# Patient Record
Sex: MALE | Race: BLACK OR AFRICAN AMERICAN | Employment: OTHER | URBAN - NONMETROPOLITAN AREA
[De-identification: names, ages, dates, MRNs, and addresses within clinical notes are randomized per-mention and may not be internally consistent; named-entity substitution may affect disease eponyms.]

---

## 2017-02-23 ENCOUNTER — TELEPHONE (OUTPATIENT)
Dept: CARDIOLOGY | Age: 52
End: 2017-02-23

## 2017-05-04 PROBLEM — Z99.2 ESRD (END STAGE RENAL DISEASE) ON DIALYSIS (HCC): Status: ACTIVE | Noted: 2017-05-04

## 2017-05-04 PROBLEM — N18.6 ESRD (END STAGE RENAL DISEASE) ON DIALYSIS (HCC): Status: ACTIVE | Noted: 2017-05-04

## 2017-05-04 PROBLEM — R73.9 HYPERGLYCEMIA: Status: ACTIVE | Noted: 2017-05-04

## 2017-05-06 ENCOUNTER — TELEPHONE (OUTPATIENT)
Dept: CARDIOLOGY | Age: 52
End: 2017-05-06

## 2017-07-14 RX ORDER — CLINDAMYCIN PHOSPHATE 600 MG/50ML
600 INJECTION INTRAVENOUS
Status: CANCELLED | OUTPATIENT
Start: 2017-07-14

## 2017-07-14 RX ORDER — MIDAZOLAM HYDROCHLORIDE 1 MG/ML
1 INJECTION INTRAMUSCULAR; INTRAVENOUS ONCE
Status: CANCELLED | OUTPATIENT
Start: 2017-07-14 | End: 2017-07-14

## 2017-07-14 RX ORDER — SODIUM CHLORIDE 450 MG/100ML
INJECTION, SOLUTION INTRAVENOUS CONTINUOUS
Status: CANCELLED | OUTPATIENT
Start: 2017-07-14

## 2017-07-14 RX ORDER — LIDOCAINE 40 MG/G
CREAM TOPICAL ONCE
Status: CANCELLED | OUTPATIENT
Start: 2017-07-14 | End: 2017-07-14

## 2017-07-17 ENCOUNTER — HOSPITAL ENCOUNTER (OUTPATIENT)
Dept: INTERVENTIONAL RADIOLOGY/VASCULAR | Age: 52
Discharge: HOME | End: 2017-07-17
Payer: MEDICARE

## 2017-07-17 ENCOUNTER — HOSPITAL ENCOUNTER (OUTPATIENT)
Dept: NURSING | Age: 52
Discharge: HOME | End: 2017-07-17
Payer: MEDICARE

## 2017-07-17 VITALS
SYSTOLIC BLOOD PRESSURE: 173 MMHG | RESPIRATION RATE: 16 BRPM | DIASTOLIC BLOOD PRESSURE: 80 MMHG | OXYGEN SATURATION: 96 % | HEART RATE: 88 BPM

## 2017-07-17 VITALS — HEART RATE: 82 BPM | OXYGEN SATURATION: 94 % | SYSTOLIC BLOOD PRESSURE: 154 MMHG | DIASTOLIC BLOOD PRESSURE: 98 MMHG

## 2017-07-17 DIAGNOSIS — N18.6 ESRD (END STAGE RENAL DISEASE) (HCC): ICD-10-CM

## 2017-07-17 PROCEDURE — 6360000004 HC RX CONTRAST MEDICATION: Performed by: RADIOLOGY

## 2017-07-17 PROCEDURE — 2780000010 HC IMPLANT OTHER

## 2017-07-17 PROCEDURE — C1894 INTRO/SHEATH, NON-LASER: HCPCS

## 2017-07-17 PROCEDURE — A6257 TRANSPARENT FILM <= 16 SQ IN: HCPCS

## 2017-07-17 PROCEDURE — 2500000003 HC RX 250 WO HCPCS

## 2017-07-17 PROCEDURE — 36902 INTRO CATH DIALYSIS CIRCUIT: CPT | Performed by: RADIOLOGY

## 2017-07-17 PROCEDURE — 6360000002 HC RX W HCPCS

## 2017-07-17 PROCEDURE — C1725 CATH, TRANSLUMIN NON-LASER: HCPCS

## 2017-07-17 PROCEDURE — C1769 GUIDE WIRE: HCPCS

## 2017-07-17 PROCEDURE — A6258 TRANSPARENT FILM >16<=48 IN: HCPCS

## 2017-07-17 RX ORDER — MIDAZOLAM HYDROCHLORIDE 1 MG/ML
0.5 INJECTION INTRAMUSCULAR; INTRAVENOUS ONCE
Status: COMPLETED | OUTPATIENT
Start: 2017-07-17 | End: 2017-07-17

## 2017-07-17 RX ADMIN — MIDAZOLAM HYDROCHLORIDE 0.5 MG: 1 INJECTION INTRAMUSCULAR; INTRAVENOUS at 08:50

## 2017-07-17 RX ADMIN — Medication 0.5 MG: at 08:50

## 2017-07-17 RX ADMIN — IOVERSOL 65 ML: 509 INJECTION INTRA-ARTERIAL; INTRAVENOUS at 10:06

## 2017-07-17 ASSESSMENT — PAIN DESCRIPTION - LOCATION: LOCATION: ARM

## 2017-07-17 ASSESSMENT — PAIN SCALES - GENERAL
PAINLEVEL_OUTOF10: 0
PAINLEVEL_OUTOF10: 3

## 2017-07-17 ASSESSMENT — PAIN DESCRIPTION - DESCRIPTORS: DESCRIPTORS: CONSTANT;ACHING

## 2017-07-17 ASSESSMENT — PAIN DESCRIPTION - PAIN TYPE: TYPE: SURGICAL PAIN

## 2017-07-17 ASSESSMENT — PAIN DESCRIPTION - ORIENTATION: ORIENTATION: LEFT;UPPER

## 2017-07-17 ASSESSMENT — PAIN DESCRIPTION - ONSET: ONSET: ON-GOING

## 2017-07-17 ASSESSMENT — PAIN DESCRIPTION - FREQUENCY: FREQUENCY: CONTINUOUS

## 2017-07-17 NOTE — IP AVS SNAPSHOT
After Visit Summary  (Discharge Instructions)    Medication List for Home    Based on the information you provided to us as well as any changes during this visit, the following is your updated medication list.  Compare this with your prescription bottles at home. If you have any questions or concerns, contact your primary care physician's office.              Daily Medication List (This medication list can be shared with any healthcare provider who is helping you manage your medications)      ASK your doctor about these medications if you have questions        Last Dose    Next Dose Due AM NOON PM NIGHT    cloNIDine 0.1 MG tablet   Commonly known as:  CATAPRES   Take 3 tablets by mouth three times daily                                         doxazosin 4 MG tablet   Commonly known as:  CARDURA   Take 1 tablet by mouth nightly                                         famotidine 20 MG tablet   Commonly known as:  PEPCID   Take 1 tablet by mouth 2 times daily                                         fluticasone 50 MCG/ACT nasal spray   Commonly known as:  FLONASE   1 spray by Nasal route daily                                         isosorbide mononitrate 120 MG extended release tablet   Commonly known as:  IMDUR   Take 1 tablet by mouth daily                                         minoxidil 10 MG tablet   Commonly known as:  LONITEN   One tab twice daily                                         nicotine 14 MG/24HR   Commonly known as:  NICODERM CQ   Place 1 patch onto the skin daily                                         rosuvastatin 10 MG tablet   Commonly known as:  CRESTOR   Take 1 tablet by mouth nightly                                         sertraline 50 MG tablet   Commonly known as:  ZOLOFT   Take 1 tablet by mouth daily                                         sevelamer 800 MG tablet   Commonly known as:  RENVELA   Take 2 tablets by mouth 3 times daily (with meals) therapeutic multivitamin-minerals tablet   Take 1 tablet by mouth daily                                         verapamil 240 MG extended release tablet   Commonly known as:  CALAN SR   Take 1 tablet by mouth daily                                         vitamin D 2000 units Caps capsule   Take by mouth 2 times daily                                                 Allergies as of 2017     No Known Allergies      Immunizations as of 2017     Name Date Dose VIS Date Route    Influenza Virus Vaccine 10/10/2015 0.5 mL 2015 Intramuscular    Influenza Virus Vaccine 2014 0.5 mL 2014 Intramuscular    Influenza, Trivalent vaccine 3 years and above, IM 10/7/2016 0.5 mL 2015 Intramuscular    Pneumococcal Polysaccharide ([id number]) 10/6/2013 0.5 mL 10/6/2009 Intramuscular    Tdap (Boostrix, Adacel) 3/8/2013 0.5 mL 2012 Intramuscular      Last Vitals          Most Recent Value    Temp      Pulse  82    Resp      BP  (!)  154/98         After Visit Summary    This summary was created for you. Thank you for entrusting your care to us. The following information includes details about your hospital/visit stay along with steps you should take to help with your recovery once you leave the hospital.  In this packet, you will find information about the topics listed below:    · Instructions about your medications including a list of your home medications  · A summary of your hospital visit  · Follow-up appointments once you have left the hospital  · Your care plan at home      You may receive a survey regarding the care you received during your stay. Your input is valuable to us. We encourage you to complete and return your survey in the envelope provided. We hope you will choose us in the future for your healthcare needs.           Patient Information     Patient Name     Jay Gill 1965      Care Provided at:     Name Address Phone [address] [phone]            Your Visit    Here you will find information about your visit, including the reason for your visit. Please take this sheet with you when you visit your doctor or other health care provider in the future. It will help determine the best possible medical care for you at that time. If you have any questions once you leave the hospital, please call the department phone number listed below. Why you were here     Your primary diagnosis was:  Not on File      Visit Information     Date & Time Department Dept. Phone    7/17/2017 Violet Tran 281 [phone]       Follow-up Appointments    Below is a list of your follow-up and future appointments. This may not be a complete list as you may have made appointments directly with providers that we are not aware of or your providers may have made some for you. Please call your providers to confirm appointments. It is important to keep your appointments. Please bring your current insurance card, photo ID, co-pay, and all medication bottles to your appointment. If self-pay, payment is expected at the time of service. Preventive Care        Date Due    Eye Exam By An Eye Doctor 1/20/1977    HIV screening is recommended for all people regardless of risk factors  aged 15-65 years at least once (lifetime) who have never been HIV tested.  1/20/1982    Pneumococcal Vaccines (two) for adults aged 19-64  years who are immunocompromised or whose spleen is missing or not working  (2 of 3 - PCV13) 10/6/2014    Diabetic Foot Exam 6/16/2015    Colonoscopy 1/20/2017    Yearly Flu Vaccine (1) 8/1/2017    Hemoglobin A1C (Test For Long-Term Glucose Control) 11/4/2017    Cholesterol Screening 11/9/2017    Tetanus Combination Vaccine (2 - Td) 3/8/2023                 Care Plan Once You Return Home This section includes instructions you will need to follow once you leave the hospital.  Your care team will discuss these with you, so you and those caring for you know how to best care for your health needs at home. This section may also include educational information about certain health topics that may be of help to you. Discharge Instructions       ACTIVITY:  Continue usual care with your doctor. Call your doctor immediately if any severe problems or go to the nearest emergency room. GUARD LEFT ARM TODAY  NO LIFTING TODAY  IF BLEEDING OCCURS, APPLY PRESSURE AND RETURN TO EMERGENCY. LEFT DRESSINGS ON. DIALYSIS AS USUAL    I have been treated and hereby acknowledge receiving this instruction sheet. Important information for a smoker       SMOKING: QUIT SMOKING. THIS IS THE MOST IMPORTANT ACTION YOU CAN TAKE TO IMPROVE YOUR CURRENT AND FUTURE HEALTH. Call the [address] [place] [address] at [facility] ([phone])    Smoking harms nonsmokers. When nonsmokers are around people who smoke, they absorb nicotine, carbon monoxide, and other ingredients of tobacco smoke. DO NOT SMOKE AROUND CHILDREN     Children exposed to secondhand smoke are at an increased risk of:  Sudden Infant Death Syndrome (SIDS), acute respiratory infections, inflammation of the middle ear, and severe asthma. Over a longer time, it causes heart disease and lung cancer. There is no safe level of exposure to secondhand smoke. MyChart Signup     Our records indicate that you have declined MyChart signup. View your information online  ? Review your current list of  medications, immunization, and allergies. ? Review your future test results online . ?  Review your discharge instructions provided by your caregivers at discharge    Certain functionality such as prescription refills, scheduling appointments or sending messages to your provider are not activated if your provider does not use [facility] in his/her office    For questions regarding your MyChart account call [phone]. If you have a clinical question, please call your doctor's office. The information on all pages of the After Visit Summary has been reviewed with me, the patient and/or responsible adult, by my health care provider(s). I had the opportunity to ask questions regarding this information. I understand I should dispose of my armband safely at home to protect my health information. A complete copy of the After Visit Summary has been given to me, the patient and/or responsible adult.            Patient Signature/Responsible Adult:____________________    Clinician Signature:_____________________    Date:_____________________    Time:_____________________

## 2017-07-17 NOTE — PROGRESS NOTES
10:25 AM PT RETURNS FROM X-RAY-SLIP KNOTS TIMES 2 DRY AND INTACT TO LEFT UPPER ARM-GOOD CAMDEN NOTED  10:26 AM FLUIDS OFFERED

## 2017-07-17 NOTE — IP AVS SNAPSHOT
Patient Information     Patient Name NITHYA Davis 1965         This is your updated medication list to keep with you all times      ASK your doctor about these medications     cloNIDine 0.1 MG tablet   Commonly known as:  CATAPRES   Take 3 tablets by mouth three times daily       doxazosin 4 MG tablet   Commonly known as:  CARDURA   Take 1 tablet by mouth nightly       famotidine 20 MG tablet   Commonly known as:  PEPCID   Take 1 tablet by mouth 2 times daily       fluticasone 50 MCG/ACT nasal spray   Commonly known as:  FLONASE       isosorbide mononitrate 120 MG extended release tablet   Commonly known as:  IMDUR   Take 1 tablet by mouth daily       minoxidil 10 MG tablet   Commonly known as:  LONITEN   One tab twice daily       nicotine 14 MG/24HR   Commonly known as:  NICODERM CQ   Place 1 patch onto the skin daily       rosuvastatin 10 MG tablet   Commonly known as:  CRESTOR   Take 1 tablet by mouth nightly       sertraline 50 MG tablet   Commonly known as:  ZOLOFT   Take 1 tablet by mouth daily       sevelamer 800 MG tablet   Commonly known as:  RENVELA   Take 2 tablets by mouth 3 times daily (with meals)       therapeutic multivitamin-minerals tablet       verapamil 240 MG extended release tablet   Commonly known as:  CALAN SR   Take 1 tablet by mouth daily       vitamin D 2000 units Caps capsule

## 2017-07-17 NOTE — IP AVS SNAPSHOT
Patient Information     Patient Name NITHYA Chen 1965      Important Information for Heart Failure       If your condition worsens or if you have any concerns, call your doctor or seek emergency medical services (dial 9-1-1) as needed. If you have any of the following symptoms/conditions, call your doctor. Call your primary care physician to obtain results of outstanding lab tests, cultures, x-rays, or other tests. If you have a current diagnosis or history of any of the following, please review the information carefully. HEART FAILURE PATIENTS:   DISCHARGE WEIGHT:    Record daily weights. Notify your doctor if you have a weight gain 2 pounds in a day, or 3-5 pounds in 1 week. Notify your doctor for increased shortness of breath, or swelling in legs or feet. Follow a low sodium diet. Resume normal activity unless otherwise instructed by your doctor.

## 2017-08-04 ENCOUNTER — HOSPITAL ENCOUNTER (INPATIENT)
Age: 52
LOS: 2 days | Discharge: HOME | DRG: 304 | End: 2017-08-06
Attending: EMERGENCY MEDICINE | Admitting: INTERNAL MEDICINE
Payer: MEDICARE

## 2017-08-04 DIAGNOSIS — F19.10 SUBSTANCE ABUSE (HCC): ICD-10-CM

## 2017-08-04 DIAGNOSIS — I16.1 HYPERTENSIVE EMERGENCY: Primary | ICD-10-CM

## 2017-08-04 LAB
AMPHETAMINE+METHAMPHETAMINE URINE SCREEN: NEGATIVE
ANION GAP SERPL CALCULATED.3IONS-SCNC: 19 MEQ/L (ref 8–16)
ANION GAP SERPL CALCULATED.3IONS-SCNC: 24 MEQ/L (ref 8–16)
ANISOCYTOSIS: ABNORMAL
BACTERIA: ABNORMAL
BARBITURATE QUANTITATIVE URINE: NEGATIVE
BASOPHILS # BLD: 0.5 %
BASOPHILS ABSOLUTE: 0 THOU/MM3 (ref 0–0.1)
BENZODIAZEPINE QUANTITATIVE URINE: NEGATIVE
BILIRUBIN URINE: NEGATIVE
BLOOD, URINE: ABNORMAL
BUN BLDV-MCNC: 83 MG/DL (ref 7–22)
BUN BLDV-MCNC: 89 MG/DL (ref 7–22)
CALCIUM SERPL-MCNC: 9 MG/DL (ref 8.5–10.5)
CALCIUM SERPL-MCNC: 9.1 MG/DL (ref 8.5–10.5)
CANNABINOID QUANTITATIVE URINE: NEGATIVE
CASTS: ABNORMAL /LPF
CASTS: ABNORMAL /LPF
CHARACTER, URINE: CLEAR
CHLORIDE BLD-SCNC: 100 MEQ/L (ref 98–111)
CHLORIDE BLD-SCNC: 99 MEQ/L (ref 98–111)
CO2: 19 MEQ/L (ref 23–33)
CO2: 22 MEQ/L (ref 23–33)
COCAINE METABOLITE QUANTITATIVE URINE: POSITIVE
COLOR: YELLOW
CREAT SERPL-MCNC: 12.7 MG/DL (ref 0.4–1.2)
CREAT SERPL-MCNC: 12.7 MG/DL (ref 0.4–1.2)
CRYSTALS: ABNORMAL
EKG ATRIAL RATE: 104 BPM
EKG P AXIS: 56 DEGREES
EKG P-R INTERVAL: 170 MS
EKG Q-T INTERVAL: 384 MS
EKG QRS DURATION: 100 MS
EKG QTC CALCULATION (BAZETT): 504 MS
EKG R AXIS: -51 DEGREES
EKG T AXIS: 90 DEGREES
EKG VENTRICULAR RATE: 104 BPM
EOSINOPHIL # BLD: 0.7 %
EOSINOPHILS ABSOLUTE: 0.1 THOU/MM3 (ref 0–0.4)
EPITHELIAL CELLS, UA: ABNORMAL /HPF
ETHYL ALCOHOL, SERUM: < 0.01 %
GFR SERPL CREATININE-BSD FRML MDRD: 5 ML/MIN/1.73M2
GFR SERPL CREATININE-BSD FRML MDRD: 5 ML/MIN/1.73M2
GLUCOSE BLD-MCNC: 124 MG/DL (ref 70–108)
GLUCOSE BLD-MCNC: 95 MG/DL (ref 70–108)
GLUCOSE, URINE: NEGATIVE MG/DL
HCT VFR BLD CALC: 37.8 % (ref 42–52)
HEMOGLOBIN: 12.6 GM/DL (ref 14–18)
KETONES, URINE: NEGATIVE
LEUKOCYTE ESTERASE, URINE: NEGATIVE
LYMPHOCYTES # BLD: 9.1 %
LYMPHOCYTES ABSOLUTE: 0.7 THOU/MM3 (ref 1–4.8)
MCH RBC QN AUTO: 32 PG (ref 27–31)
MCHC RBC AUTO-ENTMCNC: 33.2 GM/DL (ref 33–37)
MCV RBC AUTO: 96.3 FL (ref 80–94)
MISCELLANEOUS LAB TEST RESULT: ABNORMAL
MONOCYTES # BLD: 6.5 %
MONOCYTES ABSOLUTE: 0.5 THOU/MM3 (ref 0.4–1.3)
NITRITE, URINE: NEGATIVE
NUCLEATED RED BLOOD CELLS: 0 /100 WBC
OPIATES, URINE: NEGATIVE
OSMOLALITY CALCULATION: 306.2 MOSMOL/KG (ref 275–300)
OXYCODONE: NEGATIVE
PDW BLD-RTO: 14.7 % (ref 11.5–14.5)
PH UA: 7
PHENCYCLIDINE QUANTITATIVE URINE: NEGATIVE
PLATELET # BLD: 115 THOU/MM3 (ref 130–400)
PMV BLD AUTO: 8.8 MCM (ref 7.4–10.4)
POTASSIUM SERPL-SCNC: 5.4 MEQ/L (ref 3.5–5.2)
POTASSIUM SERPL-SCNC: 5.4 MEQ/L (ref 3.5–5.2)
PRO-BNP: ABNORMAL PG/ML (ref 0–900)
PROTEIN UA: 100 MG/DL
RBC # BLD: 3.92 MILL/MM3 (ref 4.7–6.1)
RBC # BLD: NORMAL 10*6/UL
RBC URINE: ABNORMAL /HPF
RENAL EPITHELIAL, UA: ABNORMAL
SEG NEUTROPHILS: 83.2 %
SEGMENTED NEUTROPHILS ABSOLUTE COUNT: 6.2 THOU/MM3 (ref 1.8–7.7)
SODIUM BLD-SCNC: 141 MEQ/L (ref 135–145)
SODIUM BLD-SCNC: 142 MEQ/L (ref 135–145)
SPECIFIC GRAVITY UA: 1.01 (ref 1–1.03)
TROPONIN T: 0.05 NG/ML
UROBILINOGEN, URINE: 0.2 EU/DL
WBC # BLD: 7.4 THOU/MM3 (ref 4.8–10.8)
WBC UA: ABNORMAL /HPF
YEAST: ABNORMAL

## 2017-08-04 PROCEDURE — 85025 COMPLETE CBC W/AUTO DIFF WBC: CPT

## 2017-08-04 PROCEDURE — 2500000003 HC RX 250 WO HCPCS: Performed by: EMERGENCY MEDICINE

## 2017-08-04 PROCEDURE — 80307 DRUG TEST PRSMV CHEM ANLYZR: CPT

## 2017-08-04 PROCEDURE — 90935 HEMODIALYSIS ONE EVALUATION: CPT

## 2017-08-04 PROCEDURE — 83880 ASSAY OF NATRIURETIC PEPTIDE: CPT

## 2017-08-04 PROCEDURE — 90935 HEMODIALYSIS ONE EVALUATION: CPT | Performed by: INTERNAL MEDICINE

## 2017-08-04 PROCEDURE — G0480 DRUG TEST DEF 1-7 CLASSES: HCPCS

## 2017-08-04 PROCEDURE — 80320 DRUG SCREEN QUANTALCOHOLS: CPT

## 2017-08-04 PROCEDURE — 96365 THER/PROPH/DIAG IV INF INIT: CPT

## 2017-08-04 PROCEDURE — 2580000003 HC RX 258: Performed by: EMERGENCY MEDICINE

## 2017-08-04 PROCEDURE — 99285 EMERGENCY DEPT VISIT HI MDM: CPT

## 2017-08-04 PROCEDURE — 80048 BASIC METABOLIC PNL TOTAL CA: CPT

## 2017-08-04 PROCEDURE — 84484 ASSAY OF TROPONIN QUANT: CPT

## 2017-08-04 PROCEDURE — 93005 ELECTROCARDIOGRAM TRACING: CPT

## 2017-08-04 PROCEDURE — 36415 COLL VENOUS BLD VENIPUNCTURE: CPT

## 2017-08-04 PROCEDURE — 81001 URINALYSIS AUTO W/SCOPE: CPT

## 2017-08-04 PROCEDURE — 6370000000 HC RX 637 (ALT 250 FOR IP): Performed by: INTERNAL MEDICINE

## 2017-08-04 PROCEDURE — 99223 1ST HOSP IP/OBS HIGH 75: CPT | Performed by: INTERNAL MEDICINE

## 2017-08-04 PROCEDURE — 99221 1ST HOSP IP/OBS SF/LOW 40: CPT | Performed by: INTERNAL MEDICINE

## 2017-08-04 PROCEDURE — 2140000000 HC CCU INTERMEDIATE R&B

## 2017-08-04 PROCEDURE — 2500000003 HC RX 250 WO HCPCS: Performed by: INTERNAL MEDICINE

## 2017-08-04 RX ORDER — SEVELAMER CARBONATE 800 MG/1
1600 TABLET, FILM COATED ORAL
Status: DISCONTINUED | OUTPATIENT
Start: 2017-08-04 | End: 2017-08-06 | Stop reason: HOSPADM

## 2017-08-04 RX ORDER — HYDRALAZINE HYDROCHLORIDE 20 MG/ML
20 INJECTION INTRAMUSCULAR; INTRAVENOUS EVERY 4 HOURS PRN
Status: DISCONTINUED | OUTPATIENT
Start: 2017-08-04 | End: 2017-08-06 | Stop reason: HOSPADM

## 2017-08-04 RX ORDER — CLONIDINE HYDROCHLORIDE 0.2 MG/1
0.2 TABLET ORAL 3 TIMES DAILY
Status: DISCONTINUED | OUTPATIENT
Start: 2017-08-04 | End: 2017-08-05

## 2017-08-04 RX ORDER — POTASSIUM CHLORIDE 7.45 MG/ML
10 INJECTION INTRAVENOUS PRN
Status: DISCONTINUED | OUTPATIENT
Start: 2017-08-04 | End: 2017-08-06 | Stop reason: HOSPADM

## 2017-08-04 RX ORDER — LABETALOL HYDROCHLORIDE 5 MG/ML
20 INJECTION, SOLUTION INTRAVENOUS ONCE
Status: COMPLETED | OUTPATIENT
Start: 2017-08-04 | End: 2017-08-04

## 2017-08-04 RX ORDER — MINOXIDIL 2.5 MG/1
10 TABLET ORAL 2 TIMES DAILY
Status: DISCONTINUED | OUTPATIENT
Start: 2017-08-04 | End: 2017-08-06 | Stop reason: HOSPADM

## 2017-08-04 RX ORDER — ATORVASTATIN CALCIUM 40 MG/1
40 TABLET, FILM COATED ORAL NIGHTLY
Status: DISCONTINUED | OUTPATIENT
Start: 2017-08-04 | End: 2017-08-06 | Stop reason: HOSPADM

## 2017-08-04 RX ORDER — ISOSORBIDE MONONITRATE 60 MG/1
120 TABLET, EXTENDED RELEASE ORAL DAILY
Status: DISCONTINUED | OUTPATIENT
Start: 2017-08-04 | End: 2017-08-06 | Stop reason: HOSPADM

## 2017-08-04 RX ORDER — CLONIDINE HYDROCHLORIDE 0.2 MG/1
0.2 TABLET ORAL ONCE
Status: COMPLETED | OUTPATIENT
Start: 2017-08-04 | End: 2017-08-04

## 2017-08-04 RX ORDER — ACETAMINOPHEN 325 MG/1
650 TABLET ORAL EVERY 4 HOURS PRN
Status: DISCONTINUED | OUTPATIENT
Start: 2017-08-04 | End: 2017-08-06 | Stop reason: HOSPADM

## 2017-08-04 RX ORDER — POTASSIUM CHLORIDE 20 MEQ/1
40 TABLET, EXTENDED RELEASE ORAL PRN
Status: DISCONTINUED | OUTPATIENT
Start: 2017-08-04 | End: 2017-08-06 | Stop reason: HOSPADM

## 2017-08-04 RX ORDER — DOXAZOSIN MESYLATE 4 MG/1
4 TABLET ORAL NIGHTLY
Status: DISCONTINUED | OUTPATIENT
Start: 2017-08-04 | End: 2017-08-06 | Stop reason: HOSPADM

## 2017-08-04 RX ORDER — DILTIAZEM HYDROCHLORIDE 5 MG/ML
20 INJECTION INTRAVENOUS ONCE
Status: COMPLETED | OUTPATIENT
Start: 2017-08-04 | End: 2017-08-04

## 2017-08-04 RX ORDER — M-VIT,TX,IRON,MINS/CALC/FOLIC 27MG-0.4MG
1 TABLET ORAL DAILY
Status: DISCONTINUED | OUTPATIENT
Start: 2017-08-04 | End: 2017-08-06 | Stop reason: HOSPADM

## 2017-08-04 RX ORDER — VERAPAMIL HYDROCHLORIDE 240 MG/1
240 TABLET, FILM COATED, EXTENDED RELEASE ORAL DAILY
Status: DISCONTINUED | OUTPATIENT
Start: 2017-08-04 | End: 2017-08-06 | Stop reason: HOSPADM

## 2017-08-04 RX ORDER — SODIUM CHLORIDE 0.9 % (FLUSH) 0.9 %
10 SYRINGE (ML) INJECTION EVERY 12 HOURS SCHEDULED
Status: DISCONTINUED | OUTPATIENT
Start: 2017-08-04 | End: 2017-08-06 | Stop reason: HOSPADM

## 2017-08-04 RX ORDER — POTASSIUM CHLORIDE 20MEQ/15ML
40 LIQUID (ML) ORAL PRN
Status: DISCONTINUED | OUTPATIENT
Start: 2017-08-04 | End: 2017-08-06 | Stop reason: HOSPADM

## 2017-08-04 RX ORDER — CLONIDINE HYDROCHLORIDE 0.2 MG/1
0.2 TABLET ORAL 3 TIMES DAILY
Status: ON HOLD | COMMUNITY
End: 2017-08-06 | Stop reason: HOSPADM

## 2017-08-04 RX ORDER — SODIUM CHLORIDE 9 MG/ML
INJECTION, SOLUTION INTRAVENOUS CONTINUOUS
Status: DISCONTINUED | OUTPATIENT
Start: 2017-08-04 | End: 2017-08-04

## 2017-08-04 RX ORDER — ONDANSETRON 2 MG/ML
4 INJECTION INTRAMUSCULAR; INTRAVENOUS EVERY 30 MIN PRN
Status: DISCONTINUED | OUTPATIENT
Start: 2017-08-04 | End: 2017-08-04

## 2017-08-04 RX ORDER — ONDANSETRON 2 MG/ML
4 INJECTION INTRAMUSCULAR; INTRAVENOUS EVERY 6 HOURS PRN
Status: DISCONTINUED | OUTPATIENT
Start: 2017-08-04 | End: 2017-08-06 | Stop reason: HOSPADM

## 2017-08-04 RX ORDER — SODIUM CHLORIDE 0.9 % (FLUSH) 0.9 %
10 SYRINGE (ML) INJECTION PRN
Status: DISCONTINUED | OUTPATIENT
Start: 2017-08-04 | End: 2017-08-06 | Stop reason: HOSPADM

## 2017-08-04 RX ORDER — FLUTICASONE PROPIONATE 50 MCG
1 SPRAY, SUSPENSION (ML) NASAL DAILY
Status: DISCONTINUED | OUTPATIENT
Start: 2017-08-04 | End: 2017-08-06 | Stop reason: HOSPADM

## 2017-08-04 RX ADMIN — CLONIDINE HYDROCHLORIDE 0.2 MG: 0.2 TABLET ORAL at 16:07

## 2017-08-04 RX ADMIN — DILTIAZEM HYDROCHLORIDE 15 MG/HR: 5 INJECTION INTRAVENOUS at 21:30

## 2017-08-04 RX ADMIN — MULTIPLE VITAMINS W/ MINERALS TAB 1 TABLET: TAB at 07:50

## 2017-08-04 RX ADMIN — MINOXIDIL 10 MG: 2.5 TABLET ORAL at 05:21

## 2017-08-04 RX ADMIN — ATORVASTATIN CALCIUM 40 MG: 40 TABLET, FILM COATED ORAL at 22:00

## 2017-08-04 RX ADMIN — VERAPAMIL HYDROCHLORIDE 240 MG: 240 TABLET, FILM COATED, EXTENDED RELEASE ORAL at 07:51

## 2017-08-04 RX ADMIN — SEVELAMER CARBONATE 1600 MG: 800 TABLET, FILM COATED ORAL at 19:26

## 2017-08-04 RX ADMIN — DILTIAZEM HYDROCHLORIDE 20 MG: 5 INJECTION INTRAVENOUS at 03:54

## 2017-08-04 RX ADMIN — FLUTICASONE PROPIONATE 1 SPRAY: 50 SPRAY, METERED NASAL at 07:52

## 2017-08-04 RX ADMIN — MINOXIDIL 10 MG: 2.5 TABLET ORAL at 22:00

## 2017-08-04 RX ADMIN — CLONIDINE HYDROCHLORIDE 0.3 MG: 0.2 TABLET ORAL at 06:00

## 2017-08-04 RX ADMIN — ISOSORBIDE MONONITRATE 120 MG: 60 TABLET ORAL at 05:21

## 2017-08-04 RX ADMIN — DILTIAZEM HYDROCHLORIDE 15 MG/HR: 5 INJECTION INTRAVENOUS at 11:44

## 2017-08-04 RX ADMIN — DILTIAZEM HYDROCHLORIDE 10 MG/HR: 5 INJECTION INTRAVENOUS at 04:32

## 2017-08-04 RX ADMIN — SEVELAMER CARBONATE 1600 MG: 800 TABLET, FILM COATED ORAL at 14:18

## 2017-08-04 RX ADMIN — DOXAZOSIN 4 MG: 4 TABLET ORAL at 22:00

## 2017-08-04 RX ADMIN — CLONIDINE HYDROCHLORIDE 0.3 MG: 0.2 TABLET ORAL at 14:19

## 2017-08-04 RX ADMIN — SEVELAMER CARBONATE 1600 MG: 800 TABLET, FILM COATED ORAL at 07:51

## 2017-08-04 RX ADMIN — SODIUM CHLORIDE: 9 INJECTION, SOLUTION INTRAVENOUS at 03:54

## 2017-08-04 RX ADMIN — DILTIAZEM HYDROCHLORIDE 5 MG/HR: 5 INJECTION INTRAVENOUS at 03:53

## 2017-08-04 RX ADMIN — SERTRALINE 50 MG: 50 TABLET, FILM COATED ORAL at 07:51

## 2017-08-04 RX ADMIN — LABETALOL HYDROCHLORIDE 20 MG: 5 INJECTION, SOLUTION INTRAVENOUS at 16:05

## 2017-08-04 RX ADMIN — CLONIDINE HYDROCHLORIDE 0.2 MG: 0.2 TABLET ORAL at 19:27

## 2017-08-04 ASSESSMENT — PAIN SCALES - GENERAL
PAINLEVEL_OUTOF10: 0

## 2017-08-04 ASSESSMENT — ENCOUNTER SYMPTOMS
COUGH: 1
SHORTNESS OF BREATH: 1
EYE DISCHARGE: 0
EYE REDNESS: 0
VOMITING: 0
WHEEZING: 0
DIARRHEA: 0
SORE THROAT: 0
NAUSEA: 0
BACK PAIN: 0
RHINORRHEA: 0
ABDOMINAL PAIN: 0

## 2017-08-05 LAB
GLUCOSE BLD-MCNC: 75 MG/DL (ref 70–108)
POTASSIUM SERPL-SCNC: 4.7 MEQ/L (ref 3.5–5.2)

## 2017-08-05 PROCEDURE — 2500000003 HC RX 250 WO HCPCS: Performed by: EMERGENCY MEDICINE

## 2017-08-05 PROCEDURE — 82948 REAGENT STRIP/BLOOD GLUCOSE: CPT

## 2017-08-05 PROCEDURE — 90935 HEMODIALYSIS ONE EVALUATION: CPT

## 2017-08-05 PROCEDURE — 36415 COLL VENOUS BLD VENIPUNCTURE: CPT

## 2017-08-05 PROCEDURE — 2140000000 HC CCU INTERMEDIATE R&B

## 2017-08-05 PROCEDURE — 6370000000 HC RX 637 (ALT 250 FOR IP): Performed by: INTERNAL MEDICINE

## 2017-08-05 PROCEDURE — 2580000003 HC RX 258: Performed by: EMERGENCY MEDICINE

## 2017-08-05 PROCEDURE — 90792 PSYCH DIAG EVAL W/MED SRVCS: CPT | Performed by: PSYCHIATRY & NEUROLOGY

## 2017-08-05 PROCEDURE — 84132 ASSAY OF SERUM POTASSIUM: CPT

## 2017-08-05 PROCEDURE — 99232 SBSQ HOSP IP/OBS MODERATE 35: CPT | Performed by: INTERNAL MEDICINE

## 2017-08-05 PROCEDURE — 90935 HEMODIALYSIS ONE EVALUATION: CPT | Performed by: INTERNAL MEDICINE

## 2017-08-05 RX ADMIN — FLUTICASONE PROPIONATE 1 SPRAY: 50 SPRAY, METERED NASAL at 07:54

## 2017-08-05 RX ADMIN — CLONIDINE HYDROCHLORIDE 0.3 MG: 0.2 TABLET ORAL at 20:25

## 2017-08-05 RX ADMIN — DOXAZOSIN 4 MG: 4 TABLET ORAL at 20:25

## 2017-08-05 RX ADMIN — ATORVASTATIN CALCIUM 40 MG: 40 TABLET, FILM COATED ORAL at 20:25

## 2017-08-05 RX ADMIN — DILTIAZEM HYDROCHLORIDE 12.5 MG/HR: 5 INJECTION INTRAVENOUS at 16:05

## 2017-08-05 RX ADMIN — MULTIPLE VITAMINS W/ MINERALS TAB 1 TABLET: TAB at 07:53

## 2017-08-05 RX ADMIN — DILTIAZEM HYDROCHLORIDE 15 MG/HR: 5 INJECTION INTRAVENOUS at 06:18

## 2017-08-05 RX ADMIN — SEVELAMER CARBONATE 1600 MG: 800 TABLET, FILM COATED ORAL at 07:53

## 2017-08-05 RX ADMIN — MINOXIDIL 10 MG: 2.5 TABLET ORAL at 21:29

## 2017-08-05 RX ADMIN — VERAPAMIL HYDROCHLORIDE 240 MG: 240 TABLET, FILM COATED, EXTENDED RELEASE ORAL at 07:54

## 2017-08-05 RX ADMIN — SERTRALINE 50 MG: 50 TABLET, FILM COATED ORAL at 07:53

## 2017-08-05 RX ADMIN — SEVELAMER CARBONATE 1600 MG: 800 TABLET, FILM COATED ORAL at 14:47

## 2017-08-05 RX ADMIN — CLONIDINE HYDROCHLORIDE 0.3 MG: 0.2 TABLET ORAL at 14:47

## 2017-08-05 RX ADMIN — SEVELAMER CARBONATE 1600 MG: 800 TABLET, FILM COATED ORAL at 17:32

## 2017-08-05 RX ADMIN — CLONIDINE HYDROCHLORIDE 0.3 MG: 0.2 TABLET ORAL at 07:54

## 2017-08-05 RX ADMIN — CLONIDINE HYDROCHLORIDE 0.2 MG: 0.2 TABLET ORAL at 00:44

## 2017-08-05 RX ADMIN — ISOSORBIDE MONONITRATE 120 MG: 60 TABLET ORAL at 07:54

## 2017-08-05 RX ADMIN — MINOXIDIL 10 MG: 2.5 TABLET ORAL at 14:47

## 2017-08-05 ASSESSMENT — PAIN SCALES - GENERAL
PAINLEVEL_OUTOF10: 0

## 2017-08-06 VITALS
HEIGHT: 75 IN | SYSTOLIC BLOOD PRESSURE: 135 MMHG | BODY MASS INDEX: 25.89 KG/M2 | TEMPERATURE: 98.9 F | RESPIRATION RATE: 20 BRPM | OXYGEN SATURATION: 95 % | HEART RATE: 95 BPM | DIASTOLIC BLOOD PRESSURE: 74 MMHG | WEIGHT: 208.2 LBS

## 2017-08-06 PROCEDURE — 6370000000 HC RX 637 (ALT 250 FOR IP): Performed by: INTERNAL MEDICINE

## 2017-08-06 PROCEDURE — 99238 HOSP IP/OBS DSCHRG MGMT 30/<: CPT | Performed by: INTERNAL MEDICINE

## 2017-08-06 PROCEDURE — 99232 SBSQ HOSP IP/OBS MODERATE 35: CPT | Performed by: INTERNAL MEDICINE

## 2017-08-06 PROCEDURE — 2580000003 HC RX 258: Performed by: INTERNAL MEDICINE

## 2017-08-06 RX ORDER — CLONIDINE HYDROCHLORIDE 0.3 MG/1
0.3 TABLET ORAL 3 TIMES DAILY
Qty: 60 TABLET | Refills: 3 | Status: ON HOLD | OUTPATIENT
Start: 2017-08-06 | End: 2018-02-05

## 2017-08-06 RX ADMIN — CLONIDINE HYDROCHLORIDE 0.3 MG: 0.2 TABLET ORAL at 07:51

## 2017-08-06 RX ADMIN — FLUTICASONE PROPIONATE 1 SPRAY: 50 SPRAY, METERED NASAL at 07:53

## 2017-08-06 RX ADMIN — SEVELAMER CARBONATE 1600 MG: 800 TABLET, FILM COATED ORAL at 12:17

## 2017-08-06 RX ADMIN — SERTRALINE 50 MG: 50 TABLET, FILM COATED ORAL at 07:51

## 2017-08-06 RX ADMIN — ISOSORBIDE MONONITRATE 120 MG: 60 TABLET ORAL at 07:51

## 2017-08-06 RX ADMIN — VERAPAMIL HYDROCHLORIDE 240 MG: 240 TABLET, FILM COATED, EXTENDED RELEASE ORAL at 07:51

## 2017-08-06 RX ADMIN — MINOXIDIL 10 MG: 2.5 TABLET ORAL at 09:03

## 2017-08-06 RX ADMIN — SEVELAMER CARBONATE 1600 MG: 800 TABLET, FILM COATED ORAL at 07:51

## 2017-08-06 RX ADMIN — Medication 10 ML: at 09:03

## 2017-08-06 RX ADMIN — MULTIPLE VITAMINS W/ MINERALS TAB 1 TABLET: TAB at 07:51

## 2017-08-06 ASSESSMENT — PAIN SCALES - GENERAL
PAINLEVEL_OUTOF10: 0
PAINLEVEL_OUTOF10: 0

## 2017-09-26 ENCOUNTER — APPOINTMENT (OUTPATIENT)
Dept: GENERAL RADIOLOGY | Age: 52
End: 2017-09-26
Payer: MEDICARE

## 2017-09-26 ENCOUNTER — HOSPITAL ENCOUNTER (EMERGENCY)
Age: 52
Discharge: HOME | End: 2017-09-27
Attending: EMERGENCY MEDICINE
Payer: MEDICARE

## 2017-09-26 VITALS
HEART RATE: 96 BPM | TEMPERATURE: 98.8 F | SYSTOLIC BLOOD PRESSURE: 128 MMHG | RESPIRATION RATE: 18 BRPM | HEIGHT: 75 IN | OXYGEN SATURATION: 94 % | DIASTOLIC BLOOD PRESSURE: 62 MMHG | WEIGHT: 220 LBS | BODY MASS INDEX: 27.35 KG/M2

## 2017-09-26 DIAGNOSIS — J40 BRONCHITIS: Primary | ICD-10-CM

## 2017-09-26 LAB
ANION GAP SERPL CALCULATED.3IONS-SCNC: 11 MEQ/L (ref 8–16)
ANISOCYTOSIS: ABNORMAL
BASOPHILS # BLD: 0.4 %
BASOPHILS ABSOLUTE: 0 THOU/MM3 (ref 0–0.1)
BUN BLDV-MCNC: 28 MG/DL (ref 7–22)
CALCIUM SERPL-MCNC: 10 MG/DL (ref 8.5–10.5)
CHLORIDE BLD-SCNC: 98 MEQ/L (ref 98–111)
CO2: 31 MEQ/L (ref 23–33)
CREAT SERPL-MCNC: 6.9 MG/DL (ref 0.4–1.2)
EOSINOPHIL # BLD: 2.1 %
EOSINOPHILS ABSOLUTE: 0.1 THOU/MM3 (ref 0–0.4)
FLU A ANTIGEN: NEGATIVE
FLU B ANTIGEN: NEGATIVE
GFR SERPL CREATININE-BSD FRML MDRD: 10 ML/MIN/1.73M2
GLUCOSE BLD-MCNC: 120 MG/DL (ref 70–108)
HCT VFR BLD CALC: 31.7 % (ref 42–52)
HEMOGLOBIN: 10.7 GM/DL (ref 14–18)
LYMPHOCYTES # BLD: 9.6 %
LYMPHOCYTES ABSOLUTE: 0.7 THOU/MM3 (ref 1–4.8)
MCH RBC QN AUTO: 32.2 PG (ref 27–31)
MCHC RBC AUTO-ENTMCNC: 33.9 GM/DL (ref 33–37)
MCV RBC AUTO: 94.9 FL (ref 80–94)
MONOCYTES # BLD: 14.2 %
MONOCYTES ABSOLUTE: 1 THOU/MM3 (ref 0.4–1.3)
NUCLEATED RED BLOOD CELLS: 0 /100 WBC
OSMOLALITY CALCULATION: 286.1 MOSMOL/KG (ref 275–300)
PDW BLD-RTO: 15 % (ref 11.5–14.5)
PLATELET # BLD: 143 THOU/MM3 (ref 130–400)
PMV BLD AUTO: 7.8 MCM (ref 7.4–10.4)
POTASSIUM SERPL-SCNC: 4.6 MEQ/L (ref 3.5–5.2)
PRO-BNP: ABNORMAL PG/ML (ref 0–900)
RBC # BLD: 3.34 MILL/MM3 (ref 4.7–6.1)
RBC # BLD: NORMAL 10*6/UL
SEG NEUTROPHILS: 73.7 %
SEGMENTED NEUTROPHILS ABSOLUTE COUNT: 5 THOU/MM3 (ref 1.8–7.7)
SODIUM BLD-SCNC: 140 MEQ/L (ref 135–145)
TROPONIN T: 0.05 NG/ML
WBC # BLD: 6.8 THOU/MM3 (ref 4.8–10.8)

## 2017-09-26 PROCEDURE — 80048 BASIC METABOLIC PNL TOTAL CA: CPT

## 2017-09-26 PROCEDURE — 87804 INFLUENZA ASSAY W/OPTIC: CPT

## 2017-09-26 PROCEDURE — 6370000000 HC RX 637 (ALT 250 FOR IP): Performed by: EMERGENCY MEDICINE

## 2017-09-26 PROCEDURE — 83880 ASSAY OF NATRIURETIC PEPTIDE: CPT

## 2017-09-26 PROCEDURE — 93005 ELECTROCARDIOGRAM TRACING: CPT | Performed by: PHYSICIAN ASSISTANT

## 2017-09-26 PROCEDURE — 71020 XR CHEST STANDARD TWO VW: CPT

## 2017-09-26 PROCEDURE — 84484 ASSAY OF TROPONIN QUANT: CPT

## 2017-09-26 PROCEDURE — 36415 COLL VENOUS BLD VENIPUNCTURE: CPT

## 2017-09-26 PROCEDURE — 99283 EMERGENCY DEPT VISIT LOW MDM: CPT

## 2017-09-26 PROCEDURE — 85025 COMPLETE CBC W/AUTO DIFF WBC: CPT

## 2017-09-26 PROCEDURE — 6370000000 HC RX 637 (ALT 250 FOR IP): Performed by: PHYSICIAN ASSISTANT

## 2017-09-26 RX ORDER — ACETAMINOPHEN 325 MG/1
650 TABLET ORAL ONCE
Status: COMPLETED | OUTPATIENT
Start: 2017-09-26 | End: 2017-09-26

## 2017-09-26 RX ORDER — ALBUTEROL SULFATE 90 UG/1
2 AEROSOL, METERED RESPIRATORY (INHALATION) EVERY 6 HOURS PRN
Status: DISCONTINUED | OUTPATIENT
Start: 2017-09-26 | End: 2017-09-27 | Stop reason: HOSPADM

## 2017-09-26 RX ORDER — CODEINE PHOSPHATE AND GUAIFENESIN 10; 100 MG/5ML; MG/5ML
5 SOLUTION ORAL ONCE
Status: COMPLETED | OUTPATIENT
Start: 2017-09-26 | End: 2017-09-26

## 2017-09-26 RX ADMIN — GUAIFENESIN AND CODEINE PHOSPHATE 5 ML: 100; 10 SOLUTION ORAL at 23:29

## 2017-09-26 RX ADMIN — ACETAMINOPHEN 650 MG: 325 TABLET ORAL at 22:19

## 2017-09-26 ASSESSMENT — PAIN DESCRIPTION - PAIN TYPE: TYPE: ACUTE PAIN

## 2017-09-26 ASSESSMENT — PAIN SCALES - GENERAL: PAINLEVEL_OUTOF10: 8

## 2017-09-26 ASSESSMENT — PAIN DESCRIPTION - LOCATION: LOCATION: CHEST

## 2017-09-26 ASSESSMENT — PAIN DESCRIPTION - ORIENTATION: ORIENTATION: MID

## 2017-09-26 ASSESSMENT — PAIN DESCRIPTION - FREQUENCY: FREQUENCY: INTERMITTENT

## 2017-09-27 LAB
EKG ATRIAL RATE: 105 BPM
EKG P AXIS: 62 DEGREES
EKG P-R INTERVAL: 166 MS
EKG Q-T INTERVAL: 358 MS
EKG QRS DURATION: 90 MS
EKG QTC CALCULATION (BAZETT): 473 MS
EKG R AXIS: -20 DEGREES
EKG T AXIS: 90 DEGREES
EKG VENTRICULAR RATE: 105 BPM

## 2017-09-27 PROCEDURE — 6370000000 HC RX 637 (ALT 250 FOR IP): Performed by: EMERGENCY MEDICINE

## 2017-09-27 RX ORDER — GUAIFENESIN AND CODEINE PHOSPHATE 100; 10 MG/5ML; MG/5ML
5 SOLUTION ORAL 3 TIMES DAILY PRN
Qty: 125 ML | Refills: 0 | Status: ON HOLD | OUTPATIENT
Start: 2017-09-27 | End: 2017-09-29

## 2017-09-27 RX ORDER — AZITHROMYCIN 250 MG/1
TABLET, FILM COATED ORAL
Qty: 1 PACKET | Refills: 0 | Status: SHIPPED | OUTPATIENT
Start: 2017-09-27 | End: 2017-10-04 | Stop reason: ALTCHOICE

## 2017-09-27 RX ADMIN — ALBUTEROL SULFATE 2 PUFF: 90 AEROSOL, METERED RESPIRATORY (INHALATION) at 00:02

## 2017-09-27 ASSESSMENT — ENCOUNTER SYMPTOMS
BLOOD IN STOOL: 0
TROUBLE SWALLOWING: 0
BACK PAIN: 0
CHEST TIGHTNESS: 0
VOICE CHANGE: 0
RHINORRHEA: 0
CHOKING: 0
ABDOMINAL PAIN: 0
COUGH: 1
PHOTOPHOBIA: 0
SHORTNESS OF BREATH: 0
CONSTIPATION: 0
EYE ITCHING: 0
ABDOMINAL DISTENTION: 0
EYE DISCHARGE: 0
SORE THROAT: 0
SINUS PRESSURE: 0
NAUSEA: 0
EYE PAIN: 0
EYE REDNESS: 0
WHEEZING: 0
VOMITING: 0
DIARRHEA: 0

## 2017-09-28 ENCOUNTER — APPOINTMENT (OUTPATIENT)
Dept: GENERAL RADIOLOGY | Age: 52
DRG: 193 | End: 2017-09-28
Payer: MEDICARE

## 2017-09-28 ENCOUNTER — APPOINTMENT (OUTPATIENT)
Dept: CT IMAGING | Age: 52
DRG: 193 | End: 2017-09-28
Payer: MEDICARE

## 2017-09-28 ENCOUNTER — HOSPITAL ENCOUNTER (INPATIENT)
Age: 52
LOS: 1 days | Discharge: HOME | DRG: 193 | End: 2017-09-29
Attending: FAMILY MEDICINE | Admitting: ANESTHESIOLOGY
Payer: MEDICARE

## 2017-09-28 DIAGNOSIS — I71.20 THORACIC AORTIC ANEURYSM WITHOUT RUPTURE: ICD-10-CM

## 2017-09-28 DIAGNOSIS — R77.8 ELEVATED TROPONIN: ICD-10-CM

## 2017-09-28 DIAGNOSIS — N18.6 END STAGE RENAL DISEASE (HCC): ICD-10-CM

## 2017-09-28 DIAGNOSIS — R07.9 CHEST PAIN, UNSPECIFIED TYPE: ICD-10-CM

## 2017-09-28 DIAGNOSIS — J18.9 PNEUMONIA DUE TO ORGANISM: Primary | ICD-10-CM

## 2017-09-28 LAB
ANION GAP SERPL CALCULATED.3IONS-SCNC: 8 MEQ/L (ref 8–16)
ANISOCYTOSIS: ABNORMAL
BASOPHILS # BLD: 0.5 %
BASOPHILS ABSOLUTE: 0 THOU/MM3 (ref 0–0.1)
BUN BLDV-MCNC: 16 MG/DL (ref 7–22)
CALCIUM SERPL-MCNC: 9.3 MG/DL (ref 8.5–10.5)
CHLORIDE BLD-SCNC: 97 MEQ/L (ref 98–111)
CO2: 30 MEQ/L (ref 23–33)
CREAT SERPL-MCNC: 5.1 MG/DL (ref 0.4–1.2)
EKG ATRIAL RATE: 89 BPM
EKG P AXIS: 40 DEGREES
EKG P-R INTERVAL: 174 MS
EKG Q-T INTERVAL: 384 MS
EKG QRS DURATION: 100 MS
EKG QTC CALCULATION (BAZETT): 467 MS
EKG R AXIS: -8 DEGREES
EKG T AXIS: 105 DEGREES
EKG VENTRICULAR RATE: 89 BPM
EOSINOPHIL # BLD: 2.5 %
EOSINOPHILS ABSOLUTE: 0.1 THOU/MM3 (ref 0–0.4)
FLU A ANTIGEN: NEGATIVE
FLU B ANTIGEN: NEGATIVE
GFR SERPL CREATININE-BSD FRML MDRD: 15 ML/MIN/1.73M2
GLUCOSE BLD-MCNC: 100 MG/DL (ref 70–108)
HCT VFR BLD CALC: 29.3 % (ref 42–52)
HEMOGLOBIN: 10 GM/DL (ref 14–18)
LYMPHOCYTES # BLD: 8.9 %
LYMPHOCYTES ABSOLUTE: 0.5 THOU/MM3 (ref 1–4.8)
MCH RBC QN AUTO: 32.2 PG (ref 27–31)
MCHC RBC AUTO-ENTMCNC: 34.2 GM/DL (ref 33–37)
MCV RBC AUTO: 94.2 FL (ref 80–94)
MONOCYTES # BLD: 17.1 %
MONOCYTES ABSOLUTE: 0.9 THOU/MM3 (ref 0.4–1.3)
NUCLEATED RED BLOOD CELLS: 0 /100 WBC
OSMOLALITY CALCULATION: 271.4 MOSMOL/KG (ref 275–300)
PDW BLD-RTO: 15 % (ref 11.5–14.5)
PLATELET # BLD: 148 THOU/MM3 (ref 130–400)
PMV BLD AUTO: 7.8 MCM (ref 7.4–10.4)
POTASSIUM SERPL-SCNC: 4.6 MEQ/L (ref 3.5–5.2)
PRO-BNP: ABNORMAL PG/ML (ref 0–900)
PROCALCITONIN: 1.47 NG/ML (ref 0.01–0.09)
RBC # BLD: 3.11 MILL/MM3 (ref 4.7–6.1)
RBC # BLD: NORMAL 10*6/UL
SEG NEUTROPHILS: 71 %
SEGMENTED NEUTROPHILS ABSOLUTE COUNT: 3.8 THOU/MM3 (ref 1.8–7.7)
SODIUM BLD-SCNC: 135 MEQ/L (ref 135–145)
TROPONIN T: 0.05 NG/ML
TROPONIN T: 0.06 NG/ML
TROPONIN T: 0.06 NG/ML
WBC # BLD: 5.4 THOU/MM3 (ref 4.8–10.8)

## 2017-09-28 PROCEDURE — 80048 BASIC METABOLIC PNL TOTAL CA: CPT

## 2017-09-28 PROCEDURE — B32TZZZ COMPUTERIZED TOMOGRAPHY (CT SCAN) OF LEFT PULMONARY ARTERY: ICD-10-PCS | Performed by: RADIOLOGY

## 2017-09-28 PROCEDURE — 87804 INFLUENZA ASSAY W/OPTIC: CPT

## 2017-09-28 PROCEDURE — 71010 XR CHEST PORTABLE: CPT

## 2017-09-28 PROCEDURE — 93005 ELECTROCARDIOGRAM TRACING: CPT | Performed by: FAMILY MEDICINE

## 2017-09-28 PROCEDURE — 6370000000 HC RX 637 (ALT 250 FOR IP): Performed by: FAMILY MEDICINE

## 2017-09-28 PROCEDURE — 36415 COLL VENOUS BLD VENIPUNCTURE: CPT

## 2017-09-28 PROCEDURE — B32S1ZZ COMPUTERIZED TOMOGRAPHY (CT SCAN) OF RIGHT PULMONARY ARTERY USING LOW OSMOLAR CONTRAST: ICD-10-PCS | Performed by: RADIOLOGY

## 2017-09-28 PROCEDURE — 84145 PROCALCITONIN (PCT): CPT

## 2017-09-28 PROCEDURE — 83880 ASSAY OF NATRIURETIC PEPTIDE: CPT

## 2017-09-28 PROCEDURE — 99285 EMERGENCY DEPT VISIT HI MDM: CPT

## 2017-09-28 PROCEDURE — 1200000003 HC TELEMETRY R&B

## 2017-09-28 PROCEDURE — 85025 COMPLETE CBC W/AUTO DIFF WBC: CPT

## 2017-09-28 PROCEDURE — 87040 BLOOD CULTURE FOR BACTERIA: CPT

## 2017-09-28 PROCEDURE — 94640 AIRWAY INHALATION TREATMENT: CPT

## 2017-09-28 PROCEDURE — B32T1ZZ COMPUTERIZED TOMOGRAPHY (CT SCAN) OF LEFT PULMONARY ARTERY USING LOW OSMOLAR CONTRAST: ICD-10-PCS | Performed by: RADIOLOGY

## 2017-09-28 PROCEDURE — 71275 CT ANGIOGRAPHY CHEST: CPT

## 2017-09-28 PROCEDURE — 6360000004 HC RX CONTRAST MEDICATION: Performed by: FAMILY MEDICINE

## 2017-09-28 PROCEDURE — B32SZZZ COMPUTERIZED TOMOGRAPHY (CT SCAN) OF RIGHT PULMONARY ARTERY: ICD-10-PCS | Performed by: RADIOLOGY

## 2017-09-28 PROCEDURE — 5A1D00Z PERFORMANCE OF URINARY FILTRATION, SINGLE: ICD-10-PCS | Performed by: INTERNAL MEDICINE

## 2017-09-28 PROCEDURE — 84484 ASSAY OF TROPONIN QUANT: CPT

## 2017-09-28 RX ORDER — SEVELAMER CARBONATE 800 MG/1
1600 TABLET, FILM COATED ORAL
Status: DISCONTINUED | OUTPATIENT
Start: 2017-09-29 | End: 2017-09-29 | Stop reason: HOSPADM

## 2017-09-28 RX ORDER — ISOSORBIDE MONONITRATE 60 MG/1
120 TABLET, EXTENDED RELEASE ORAL DAILY
Status: DISCONTINUED | OUTPATIENT
Start: 2017-09-29 | End: 2017-09-29 | Stop reason: HOSPADM

## 2017-09-28 RX ORDER — ATORVASTATIN CALCIUM 40 MG/1
40 TABLET, FILM COATED ORAL NIGHTLY
Status: DISCONTINUED | OUTPATIENT
Start: 2017-09-28 | End: 2017-09-29 | Stop reason: HOSPADM

## 2017-09-28 RX ORDER — ONDANSETRON 2 MG/ML
4 INJECTION INTRAMUSCULAR; INTRAVENOUS EVERY 6 HOURS PRN
Status: DISCONTINUED | OUTPATIENT
Start: 2017-09-28 | End: 2017-09-29 | Stop reason: HOSPADM

## 2017-09-28 RX ORDER — FAMOTIDINE 20 MG/1
20 TABLET, FILM COATED ORAL DAILY
Status: DISCONTINUED | OUTPATIENT
Start: 2017-09-29 | End: 2017-09-29 | Stop reason: HOSPADM

## 2017-09-28 RX ORDER — HEPARIN SODIUM 5000 [USP'U]/ML
5000 INJECTION, SOLUTION INTRAVENOUS; SUBCUTANEOUS 2 TIMES DAILY
Status: DISCONTINUED | OUTPATIENT
Start: 2017-09-28 | End: 2017-09-29 | Stop reason: HOSPADM

## 2017-09-28 RX ORDER — AZITHROMYCIN 250 MG/1
500 TABLET, FILM COATED ORAL ONCE
Status: COMPLETED | OUTPATIENT
Start: 2017-09-28 | End: 2017-09-28

## 2017-09-28 RX ORDER — NITROGLYCERIN 0.4 MG/1
0.4 TABLET SUBLINGUAL EVERY 5 MIN PRN
Status: DISCONTINUED | OUTPATIENT
Start: 2017-09-28 | End: 2017-09-29 | Stop reason: HOSPADM

## 2017-09-28 RX ORDER — IPRATROPIUM BROMIDE AND ALBUTEROL SULFATE 2.5; .5 MG/3ML; MG/3ML
1 SOLUTION RESPIRATORY (INHALATION) ONCE
Status: COMPLETED | OUTPATIENT
Start: 2017-09-28 | End: 2017-09-28

## 2017-09-28 RX ORDER — DOXAZOSIN MESYLATE 4 MG/1
4 TABLET ORAL NIGHTLY
Status: DISCONTINUED | OUTPATIENT
Start: 2017-09-28 | End: 2017-09-29 | Stop reason: HOSPADM

## 2017-09-28 RX ORDER — SODIUM CHLORIDE 0.9 % (FLUSH) 0.9 %
10 SYRINGE (ML) INJECTION PRN
Status: DISCONTINUED | OUTPATIENT
Start: 2017-09-28 | End: 2017-09-29 | Stop reason: HOSPADM

## 2017-09-28 RX ORDER — NICOTINE 21 MG/24HR
1 PATCH, TRANSDERMAL 24 HOURS TRANSDERMAL DAILY
Status: DISCONTINUED | OUTPATIENT
Start: 2017-09-29 | End: 2017-09-29 | Stop reason: HOSPADM

## 2017-09-28 RX ORDER — M-VIT,TX,IRON,MINS/CALC/FOLIC 27MG-0.4MG
1 TABLET ORAL DAILY
Status: DISCONTINUED | OUTPATIENT
Start: 2017-09-29 | End: 2017-09-29 | Stop reason: HOSPADM

## 2017-09-28 RX ORDER — ACETAMINOPHEN 325 MG/1
650 TABLET ORAL EVERY 4 HOURS PRN
Status: DISCONTINUED | OUTPATIENT
Start: 2017-09-28 | End: 2017-09-29 | Stop reason: HOSPADM

## 2017-09-28 RX ORDER — MINOXIDIL 2.5 MG/1
10 TABLET ORAL 2 TIMES DAILY
Status: DISCONTINUED | OUTPATIENT
Start: 2017-09-28 | End: 2017-09-29 | Stop reason: HOSPADM

## 2017-09-28 RX ORDER — AZITHROMYCIN 250 MG/1
250 TABLET, FILM COATED ORAL DAILY
Status: DISCONTINUED | OUTPATIENT
Start: 2017-09-29 | End: 2017-09-29

## 2017-09-28 RX ORDER — SODIUM CHLORIDE 0.9 % (FLUSH) 0.9 %
10 SYRINGE (ML) INJECTION EVERY 12 HOURS SCHEDULED
Status: DISCONTINUED | OUTPATIENT
Start: 2017-09-28 | End: 2017-09-29 | Stop reason: HOSPADM

## 2017-09-28 RX ORDER — ASPIRIN 81 MG/1
81 TABLET ORAL DAILY
Status: DISCONTINUED | OUTPATIENT
Start: 2017-09-29 | End: 2017-09-29 | Stop reason: HOSPADM

## 2017-09-28 RX ORDER — CODEINE PHOSPHATE AND GUAIFENESIN 10; 100 MG/5ML; MG/5ML
5 SOLUTION ORAL 3 TIMES DAILY PRN
Status: DISCONTINUED | OUTPATIENT
Start: 2017-09-28 | End: 2017-09-29 | Stop reason: HOSPADM

## 2017-09-28 RX ORDER — VERAPAMIL HYDROCHLORIDE 240 MG/1
240 TABLET, FILM COATED, EXTENDED RELEASE ORAL DAILY
Status: DISCONTINUED | OUTPATIENT
Start: 2017-09-29 | End: 2017-09-29 | Stop reason: HOSPADM

## 2017-09-28 RX ORDER — FLUTICASONE PROPIONATE 50 MCG
1 SPRAY, SUSPENSION (ML) NASAL DAILY
Status: DISCONTINUED | OUTPATIENT
Start: 2017-09-29 | End: 2017-09-29 | Stop reason: HOSPADM

## 2017-09-28 RX ADMIN — IOPAMIDOL 80 ML: 755 INJECTION, SOLUTION INTRAVENOUS at 19:18

## 2017-09-28 RX ADMIN — AZITHROMYCIN 500 MG: 250 TABLET, FILM COATED ORAL at 15:26

## 2017-09-28 RX ADMIN — IPRATROPIUM BROMIDE AND ALBUTEROL SULFATE 1 AMPULE: .5; 3 SOLUTION RESPIRATORY (INHALATION) at 17:05

## 2017-09-28 ASSESSMENT — PAIN DESCRIPTION - PROGRESSION
CLINICAL_PROGRESSION: NOT CHANGED
CLINICAL_PROGRESSION: GRADUALLY IMPROVING
CLINICAL_PROGRESSION: NOT CHANGED

## 2017-09-28 ASSESSMENT — PAIN DESCRIPTION - ORIENTATION: ORIENTATION: RIGHT

## 2017-09-28 ASSESSMENT — ENCOUNTER SYMPTOMS
COUGH: 1
BACK PAIN: 0
SHORTNESS OF BREATH: 0
ABDOMINAL PAIN: 0
EYE DISCHARGE: 0
EYE REDNESS: 0
WHEEZING: 0
RHINORRHEA: 1
DIARRHEA: 0
NAUSEA: 0
SORE THROAT: 0
VOMITING: 0

## 2017-09-28 ASSESSMENT — PAIN DESCRIPTION - DESCRIPTORS: DESCRIPTORS: CONSTANT;JABBING

## 2017-09-28 ASSESSMENT — PAIN DESCRIPTION - LOCATION: LOCATION: CHEST

## 2017-09-28 ASSESSMENT — PAIN SCALES - GENERAL
PAINLEVEL_OUTOF10: 4
PAINLEVEL_OUTOF10: 4
PAINLEVEL_OUTOF10: 6
PAINLEVEL_OUTOF10: 5

## 2017-09-28 ASSESSMENT — PAIN DESCRIPTION - ONSET: ONSET: SUDDEN

## 2017-09-28 ASSESSMENT — PAIN DESCRIPTION - FREQUENCY: FREQUENCY: CONTINUOUS

## 2017-09-28 ASSESSMENT — PAIN DESCRIPTION - PAIN TYPE: TYPE: ACUTE PAIN

## 2017-09-28 NOTE — ED NOTES
Patient presents to the ED with complaints of chest pain since this morning. Patient states he was at dialysis when the pain started, walking over once dialysis was completed. Patient reports pain in the upper right chest wall. Patient reports a cough x 2 weeks with pain during a deep breath. Patient reports intermittent N/V x 1 day.       Angel Roach RN  09/28/17 6822

## 2017-09-28 NOTE — IP AVS SNAPSHOT
Patient Information     Patient Name NITHYA Espinoza 1965         This is your updated medication list to keep with you all times      TAKE these medications     azithromycin 250 MG tablet   Commonly known as:  ZITHROMAX   Please take 2 tablets on day 1 and one tablet on days 2 through 5       benzonatate 100 MG capsule   Commonly known as:  TESSALON       cloNIDine 0.3 MG tablet   Commonly known as:  CATAPRES   Take 1 tablet by mouth three times daily       doxazosin 4 MG tablet   Commonly known as:  CARDURA   Take 1 tablet by mouth nightly       famotidine 20 MG tablet   Commonly known as:  PEPCID       fluticasone 50 MCG/ACT nasal spray   Commonly known as:  FLONASE       isosorbide mononitrate 120 MG extended release tablet   Commonly known as:  IMDUR   Take 1 tablet by mouth daily       minoxidil 10 MG tablet   Commonly known as:  LONITEN   One tab twice daily       sertraline 50 MG tablet   Commonly known as:  ZOLOFT   Take 1 tablet by mouth daily       sevelamer 800 MG tablet   Commonly known as:  RENVELA   Take 2 tablets by mouth 3 times daily (with meals)       therapeutic multivitamin-minerals tablet       verapamil 240 MG extended release tablet   Commonly known as:  CALAN SR   Take 1 tablet by mouth daily       vitamin D 2000 units Caps capsule

## 2017-09-28 NOTE — ED PROVIDER NOTES
Negative for environmental allergies. Neurological: Negative for dizziness, syncope, weakness, light-headedness and headaches. Hematological: Negative for adenopathy. Psychiatric/Behavioral: Negative for agitation, confusion, dysphoric mood and suicidal ideas. The patient is not nervous/anxious. PAST MEDICAL HISTORY    has a past medical history of AAA (abdominal aortic aneurysm) ([facility] [address] Acute on chronic diastolic CHF (congestive heart failure), NYHA class 2 ([facility] [address] NURIS (acute kidney injury) ([facility] [address] Anemia associated with chronic renal failure; Arthritis; Cocaine abuse; Depression; Diabetes mellitus ([facility] [address] Diastolic heart failure secondary to coronary artery disease (HCC); FSGS (focal segmental glomerulosclerosis); Hemodialysis patient [facility]); Hemorrhoids; History of blood transfusion; Hyperlipidemia; Hyperphosphatemia; Hypertension; Left renal artery stenosis ([facility] [address] Monoclonal (M) protein disease, multiple 'M' protein; Nicotine dependence; Noncompliance; Pneumonia; Psychiatric problem; PTSD (post-traumatic stress disorder); Secondary hyperparathyroidism (of renal origin); and Sleep apnea. SURGICAL HISTORY      has a past surgical history that includes Leg Tendon Surgery; EKG 12 Lead (9/24/2015); Dialysis fistula creation (Left, 07/08/2016); vascular surgery (Left, 07/08/2016); and vascular surgery (Right, 1990).     CURRENT MEDICATIONS       Previous Medications    AZITHROMYCIN (ZITHROMAX) 250 MG TABLET    Please take 2 tablets on day 1 and one tablet on days 2 through 5    CHOLECALCIFEROL (VITAMIN D) 2000 UNITS CAPS CAPSULE    Take by mouth 2 times daily    CLONIDINE (CATAPRES) 0.3 MG TABLET    Take 1 tablet by mouth three times daily    DOXAZOSIN (CARDURA) 4 MG TABLET    Take 1 tablet by mouth nightly    FLUTICASONE (FLONASE) 50 MCG/ACT NASAL SPRAY    1 spray by Nasal route daily    GUAIFENESIN-CODEINE (TUSSI-ORGANIDIN NR) 100-10 MG/5ML SYRUP    Take 5 mLs by mouth 3 times daily as needed for Cough technology. **                   XR Chest Portable   Final Result   1. There are new left perihilar and right basilar infiltrates. 2. There is blunting of the left lateral costophrenic angle suggesting a small amount of pleural fluid. **This report has been created using voice recognition software. It may contain minor errors which are inherent in voice recognition technology. **      Final report electronically signed by Dr. Reji Carey on 9/28/2017 3:24 PM          LABS:   Labs Reviewed   CBC WITH AUTO DIFFERENTIAL - Abnormal; Notable for the following:        Result Value    RBC 3.11 (*)     Hemoglobin 10.0 (*)     Hematocrit 29.3 (*)     MCV 94.2 (*)     MCH 32.2 (*)     RDW 15.0 (*)     Lymphocytes # 0.5 (*)     All other components within normal limits   BASIC METABOLIC PANEL - Abnormal; Notable for the following:     Chloride 97 (*)     CREATININE 5.1 (*)     All other components within normal limits   BRAIN NATRIURETIC PEPTIDE - Abnormal; Notable for the following:     Pro-BNP 47928.0 (*)     All other components within normal limits   TROPONIN - Abnormal; Notable for the following:     Troponin T 0.053 (*)     All other components within normal limits   OSMOLALITY - Abnormal; Notable for the following:     Osmolality Calc 271.4 (*)     All other components within normal limits   GLOMERULAR FILTRATION RATE, ESTIMATED - Abnormal; Notable for the following:     Est, Glom Filt Rate 15 (*)     All other components within normal limits   TROPONIN - Abnormal; Notable for the following:     Troponin T 0.061 (*)     All other components within normal limits   PROCALCITONIN - Abnormal; Notable for the following:     Procalcitonin 1.47 (*)     All other components within normal limits   RAPID INFLUENZA A/B ANTIGENS   CULTURE BLOOD #1   CULTURE BLOOD #2   ANION GAP   URINE DRUG SCREEN       EMERGENCY DEPARTMENT COURSE:   Vitals:    Vitals:    09/28/17 1527 09/28/17 1742 09/28/17 1910 09/28/17 2013   BP: (!)

## 2017-09-28 NOTE — IP AVS SNAPSHOT
This summary was created for you. Thank you for entrusting your care to us. The following information includes details about your hospital/visit stay along with steps you should take to help with your recovery once you leave the hospital.  In this packet, you will find information about the topics listed below:    · Instructions about your medications including a list of your home medications  · A summary of your hospital visit  · Follow-up appointments once you have left the hospital  · Your care plan at home      You may receive a survey regarding the care you received during your stay. Your input is valuable to us. We encourage you to complete and return your survey in the envelope provided. We hope you will choose us in the future for your healthcare needs. Patient Information     Patient Name NITHYA Charles 1965      Care Provided at:     Name Address Phone       [address] [phone]            Your Visit    Here you will find information about your visit, including the reason for your visit. Please take this sheet with you when you visit your doctor or other health care provider in the future. It will help determine the best possible medical care for you at that time. If you have any questions once you leave the hospital, please call the department phone number listed below.         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 When should you call for help? Call 911 anytime you think you may need emergency care. For example, call if:  · You have severe trouble breathing. Call your doctor now or seek immediate medical care if:  · You cough up dark brown or bloody mucus (sputum). · You have new or worse trouble breathing. · You are dizzy or lightheaded, or you feel like you may faint. Watch closely for changes in your health, and be sure to contact your doctor if:  · You have a new or higher fever. · You are coughing more deeply or more often. · You are not getting better after 2 days (48 hours). · You do not get better as expected. Where can you learn more? Go to https://Kidaptive.Checkd.In. org and sign in to your [employer] account. Enter D336 in the Threadbox box to learn more about \"Pneumonia: Care Instructions. \"     If you do not have an account, please click on the \"Sign Up Now\" link. Current as of: March 25, 2017  Content Version: 11.3  © 9816-6529 [employer]. Care instructions adapted under license by [facility] ([facility]). If you have questions about a medical condition or this instruction, always ask your healthcare professional. [address] any warranty or liability for your use of this information. Important information for a smoker       SMOKING: QUIT SMOKING. THIS IS THE MOST IMPORTANT ACTION YOU CAN TAKE TO IMPROVE YOUR CURRENT AND FUTURE HEALTH. Call the [address] [place] [address] at [place] NOW ([phone])    Smoking harms nonsmokers. When nonsmokers are around people who smoke, they absorb nicotine, carbon monoxide, and other ingredients of tobacco smoke. DO NOT SMOKE AROUND CHILDREN     Children exposed to secondhand smoke are at an increased risk of:  Sudden Infant Death Syndrome (SIDS), acute respiratory infections, inflammation of the middle ear, and severe asthma.   Over a longer time, it causes heart problems. Its also a good idea to know your test results and keep a list of the medicines you take. How can you care for yourself at home? · Take your antibiotics exactly as directed. Do not stop taking the medicine just because you are feeling better. You need to take the full course of antibiotics. · Take your medicines exactly as prescribed. Call your doctor if you think you are having a problem with your medicine. · Get plenty of rest and sleep. You may feel weak and tired for a while, but your energy level will improve with time. · To prevent dehydration, drink plenty of fluids, enough so that your urine is light yellow or clear like water. Choose water and other caffeine-free clear liquids until you feel better. If you have kidney, heart, or liver disease and have to limit fluids, talk with your doctor before you increase the amount of fluids you drink. · Take care of your cough so you can rest. A cough that brings up mucus from your lungs is common with pneumonia. It is one way your body gets rid of the infection. But if coughing keeps you from resting or causes severe fatigue and chest-wall pain, talk to your doctor. He or she may suggest that you take a medicine to reduce the cough. · Use a vaporizer or humidifier to add moisture to your bedroom. Follow the directions for cleaning the machine. · Do not smoke or allow others to smoke around you. Smoke will make your cough last longer. If you need help quitting, talk to your doctor about stop-smoking programs and medicines. These can increase your chances of quitting for good. · Take an over-the-counter pain medicine, such as acetaminophen (Tylenol), ibuprofen (Advil, Motrin), or naproxen (Aleve). Read and follow all instructions on the label. · Do not take two or more pain medicines at the same time unless the doctor told you to. Many pain medicines have acetaminophen, which is Tylenol. Too much acetaminophen (Tylenol) can be harmful. · If you were given a spirometer to measure how well your lungs are working, use it as instructed. This can help your doctor tell how your recovery is going. · To prevent pneumonia in the future, talk to your doctor about getting a flu vaccine (once a year) and a pneumococcal vaccine (one time only for most people). When should you call for help? Call 911 anytime you think you may need emergency care. For example, call if:  · You have severe trouble breathing. Call your doctor now or seek immediate medical care if:  · You cough up dark brown or bloody mucus (sputum). · You have new or worse trouble breathing. · You are dizzy or lightheaded, or you feel like you may faint. Watch closely for changes in your health, and be sure to contact your doctor if:  · You have a new or higher fever. · You are coughing more deeply or more often. · You are not getting better after 2 days (48 hours). · You do not get better as expected. Where can you learn more? Go to https://Pimovation.Wholelife Companies. org and sign in to your [employer] account. Enter D336 in the [employer] box to learn more about \"Pneumonia: Care Instructions. \"     If you do not have an account, please click on the \"Sign Up Now\" link. Current as of: March 25, 2017  Content Version: 11.3  © 6084-2906 [employer]. Care instructions adapted under license by [address] If you have questions about a medical condition or this instruction, always ask your healthcare professional. [place] [address] any warranty or liability for your use of this information.

## 2017-09-28 NOTE — IP AVS SNAPSHOT
Patient Information     Patient Name NITHYA Rodríguez 1965      Important Information for Heart Failure       If your condition worsens or if you have any concerns, call your doctor or seek emergency medical services (dial 9-1-1) as needed. If you have any of the following symptoms/conditions, call your doctor. Call your primary care physician to obtain results of outstanding lab tests, cultures, x-rays, or other tests. If you have a current diagnosis or history of any of the following, please review the information carefully. HEART FAILURE PATIENTS:   DISCHARGE WEIGHT: Weight: 208 lb 12.8 oz (94.7 kg)  Record daily weights. Notify your doctor if you have a weight gain 2 pounds in a day, or 3-5 pounds in 1 week. Notify your doctor for increased shortness of breath, or swelling in legs or feet. Follow a low sodium diet. Resume normal activity unless otherwise instructed by your doctor.

## 2017-09-28 NOTE — CARE COORDINATION
Brief ED Social Work Assessment  9/28/17, 3:19 PM    Spoke with patient and he stated that he has [facility]. Patient stated that he is a dialysis patient. Discussed applying for Medicaid and he stated that he knows nothing about applying for Medicaid. Discussed that patient should work with  at Dialysis to get assistance with applying. Patient stated that he can get his medications tomorrow after he gets paid. Updated MD and he is going to give patient something here and then he can fill his script tomorrow.

## 2017-09-29 VITALS
HEART RATE: 79 BPM | HEIGHT: 75 IN | TEMPERATURE: 98.1 F | BODY MASS INDEX: 25.96 KG/M2 | WEIGHT: 208.8 LBS | SYSTOLIC BLOOD PRESSURE: 105 MMHG | RESPIRATION RATE: 17 BRPM | DIASTOLIC BLOOD PRESSURE: 56 MMHG | OXYGEN SATURATION: 94 %

## 2017-09-29 PROBLEM — J18.9 PNEUMONIA: Status: ACTIVE | Noted: 2017-09-29

## 2017-09-29 LAB
ALBUMIN SERPL-MCNC: 3.5 G/DL (ref 3.5–5.1)
ALP BLD-CCNC: 58 U/L (ref 38–126)
ALT SERPL-CCNC: 6 U/L (ref 11–66)
AMPHETAMINE+METHAMPHETAMINE URINE SCREEN: NEGATIVE
ANION GAP SERPL CALCULATED.3IONS-SCNC: 13 MEQ/L (ref 8–16)
ANISOCYTOSIS: ABNORMAL
APTT: 29.9 SECONDS (ref 22–38)
AST SERPL-CCNC: 8 U/L (ref 5–40)
BARBITURATE QUANTITATIVE URINE: NEGATIVE
BASOPHILS # BLD: 0.3 %
BASOPHILS ABSOLUTE: 0 THOU/MM3 (ref 0–0.1)
BENZODIAZEPINE QUANTITATIVE URINE: NEGATIVE
BILIRUB SERPL-MCNC: 0.3 MG/DL (ref 0.3–1.2)
BUN BLDV-MCNC: 30 MG/DL (ref 7–22)
CALCIUM SERPL-MCNC: 10 MG/DL (ref 8.5–10.5)
CANNABINOID QUANTITATIVE URINE: NEGATIVE
CHLORIDE BLD-SCNC: 96 MEQ/L (ref 98–111)
CO2: 28 MEQ/L (ref 23–33)
COCAINE METABOLITE QUANTITATIVE URINE: POSITIVE
CREAT SERPL-MCNC: 7.4 MG/DL (ref 0.4–1.2)
EOSINOPHIL # BLD: 3.5 %
EOSINOPHILS ABSOLUTE: 0.2 THOU/MM3 (ref 0–0.4)
GFR SERPL CREATININE-BSD FRML MDRD: 9 ML/MIN/1.73M2
GLUCOSE BLD-MCNC: 93 MG/DL (ref 70–108)
GLUCOSE BLD-MCNC: 93 MG/DL (ref 70–108)
GLUCOSE BLD-MCNC: 97 MG/DL (ref 70–108)
HCT VFR BLD CALC: 32.2 % (ref 42–52)
HEMOGLOBIN: 10.7 GM/DL (ref 14–18)
INR BLD: 1.03 (ref 0.85–1.13)
LACTIC ACID: 0.5 MMOL/L (ref 0.5–2.2)
LYMPHOCYTES # BLD: 11.4 %
LYMPHOCYTES ABSOLUTE: 0.6 THOU/MM3 (ref 1–4.8)
MAGNESIUM: 2.3 MG/DL (ref 1.6–2.4)
MCH RBC QN AUTO: 31.7 PG (ref 27–31)
MCHC RBC AUTO-ENTMCNC: 33.4 GM/DL (ref 33–37)
MCV RBC AUTO: 94.9 FL (ref 80–94)
MONOCYTES # BLD: 14.5 %
MONOCYTES ABSOLUTE: 0.8 THOU/MM3 (ref 0.4–1.3)
NUCLEATED RED BLOOD CELLS: 0 /100 WBC
OPIATES, URINE: POSITIVE
OXYCODONE: NEGATIVE
PDW BLD-RTO: 15 % (ref 11.5–14.5)
PHENCYCLIDINE QUANTITATIVE URINE: NEGATIVE
PHOSPHORUS: 5 MG/DL (ref 2.4–4.7)
PLATELET # BLD: 186 THOU/MM3 (ref 130–400)
PMV BLD AUTO: 7.6 MCM (ref 7.4–10.4)
POTASSIUM SERPL-SCNC: 5.3 MEQ/L (ref 3.5–5.2)
POTASSIUM SERPL-SCNC: 5.6 MEQ/L (ref 3.5–5.2)
RBC # BLD: 3.39 MILL/MM3 (ref 4.7–6.1)
RBC # BLD: NORMAL 10*6/UL
SEG NEUTROPHILS: 70.3 %
SEGMENTED NEUTROPHILS ABSOLUTE COUNT: 3.7 THOU/MM3 (ref 1.8–7.7)
SODIUM BLD-SCNC: 137 MEQ/L (ref 135–145)
TOTAL PROTEIN: 6.6 G/DL (ref 6.1–8)
TROPONIN T: 0.04 NG/ML
TROPONIN T: 0.06 NG/ML
WBC # BLD: 5.3 THOU/MM3 (ref 4.8–10.8)

## 2017-09-29 PROCEDURE — 85610 PROTHROMBIN TIME: CPT

## 2017-09-29 PROCEDURE — 99221 1ST HOSP IP/OBS SF/LOW 40: CPT | Performed by: INTERNAL MEDICINE

## 2017-09-29 PROCEDURE — 85730 THROMBOPLASTIN TIME PARTIAL: CPT

## 2017-09-29 PROCEDURE — 84132 ASSAY OF SERUM POTASSIUM: CPT

## 2017-09-29 PROCEDURE — 6370000000 HC RX 637 (ALT 250 FOR IP): Performed by: NURSE PRACTITIONER

## 2017-09-29 PROCEDURE — 80307 DRUG TEST PRSMV CHEM ANLYZR: CPT

## 2017-09-29 PROCEDURE — 2580000003 HC RX 258: Performed by: ANESTHESIOLOGY

## 2017-09-29 PROCEDURE — 99238 HOSP IP/OBS DSCHRG MGMT 30/<: CPT | Performed by: HOSPITALIST

## 2017-09-29 PROCEDURE — 85025 COMPLETE CBC W/AUTO DIFF WBC: CPT

## 2017-09-29 PROCEDURE — 83605 ASSAY OF LACTIC ACID: CPT

## 2017-09-29 PROCEDURE — 80053 COMPREHEN METABOLIC PANEL: CPT

## 2017-09-29 PROCEDURE — 84100 ASSAY OF PHOSPHORUS: CPT

## 2017-09-29 PROCEDURE — 6360000002 HC RX W HCPCS: Performed by: ANESTHESIOLOGY

## 2017-09-29 PROCEDURE — 82948 REAGENT STRIP/BLOOD GLUCOSE: CPT

## 2017-09-29 PROCEDURE — 83735 ASSAY OF MAGNESIUM: CPT

## 2017-09-29 PROCEDURE — 36415 COLL VENOUS BLD VENIPUNCTURE: CPT

## 2017-09-29 PROCEDURE — 84484 ASSAY OF TROPONIN QUANT: CPT

## 2017-09-29 PROCEDURE — 6370000000 HC RX 637 (ALT 250 FOR IP): Performed by: ANESTHESIOLOGY

## 2017-09-29 PROCEDURE — 99999 PR OFFICE/OUTPT VISIT,PROCEDURE ONLY: CPT | Performed by: NURSE PRACTITIONER

## 2017-09-29 RX ORDER — FAMOTIDINE 20 MG/1
20 TABLET, FILM COATED ORAL DAILY
Status: ON HOLD | COMMUNITY
End: 2018-03-23 | Stop reason: HOSPADM

## 2017-09-29 RX ORDER — SODIUM POLYSTYRENE SULFONATE 15 G/60ML
15 SUSPENSION ORAL; RECTAL ONCE
Status: COMPLETED | OUTPATIENT
Start: 2017-09-29 | End: 2017-09-29

## 2017-09-29 RX ORDER — BENZONATATE 100 MG/1
100 CAPSULE ORAL 2 TIMES DAILY PRN
COMMUNITY
End: 2017-10-04 | Stop reason: ALTCHOICE

## 2017-09-29 RX ADMIN — Medication 10 ML: at 01:54

## 2017-09-29 RX ADMIN — GUAIFENESIN AND CODEINE PHOSPHATE 5 ML: 100; 10 SOLUTION ORAL at 08:29

## 2017-09-29 RX ADMIN — MINOXIDIL 10 MG: 2.5 TABLET ORAL at 01:54

## 2017-09-29 RX ADMIN — ASPIRIN 81 MG: 81 TABLET ORAL at 08:17

## 2017-09-29 RX ADMIN — DOXAZOSIN 4 MG: 4 TABLET ORAL at 01:54

## 2017-09-29 RX ADMIN — HEPARIN SODIUM 5000 UNITS: 5000 INJECTION, SOLUTION INTRAVENOUS; SUBCUTANEOUS at 08:18

## 2017-09-29 RX ADMIN — HEPARIN SODIUM 5000 UNITS: 5000 INJECTION, SOLUTION INTRAVENOUS; SUBCUTANEOUS at 01:54

## 2017-09-29 RX ADMIN — SERTRALINE 50 MG: 50 TABLET, FILM COATED ORAL at 08:17

## 2017-09-29 RX ADMIN — Medication 15 G: at 12:45

## 2017-09-29 RX ADMIN — FLUTICASONE PROPIONATE 1 SPRAY: 50 SPRAY, METERED NASAL at 08:15

## 2017-09-29 RX ADMIN — METOPROLOL TARTRATE 25 MG: 25 TABLET ORAL at 08:17

## 2017-09-29 RX ADMIN — MULTIPLE VITAMINS W/ MINERALS TAB 1 TABLET: TAB at 08:16

## 2017-09-29 RX ADMIN — VERAPAMIL HYDROCHLORIDE 240 MG: 240 TABLET, FILM COATED, EXTENDED RELEASE ORAL at 08:18

## 2017-09-29 RX ADMIN — CLONIDINE HYDROCHLORIDE 0.3 MG: 0.2 TABLET ORAL at 08:16

## 2017-09-29 RX ADMIN — ISOSORBIDE MONONITRATE 120 MG: 60 TABLET ORAL at 08:17

## 2017-09-29 RX ADMIN — FAMOTIDINE 20 MG: 20 TABLET, FILM COATED ORAL at 08:17

## 2017-09-29 RX ADMIN — CLONIDINE HYDROCHLORIDE 0.3 MG: 0.2 TABLET ORAL at 01:54

## 2017-09-29 RX ADMIN — MINOXIDIL 10 MG: 2.5 TABLET ORAL at 08:16

## 2017-09-29 RX ADMIN — METOPROLOL TARTRATE 25 MG: 25 TABLET ORAL at 01:54

## 2017-09-29 RX ADMIN — ATORVASTATIN CALCIUM 40 MG: 40 TABLET, FILM COATED ORAL at 01:54

## 2017-09-29 RX ADMIN — SEVELAMER CARBONATE 1600 MG: 800 TABLET, FILM COATED ORAL at 12:14

## 2017-09-29 RX ADMIN — Medication 10 ML: at 08:15

## 2017-09-29 RX ADMIN — AZITHROMYCIN MONOHYDRATE 500 MG: 500 INJECTION, POWDER, LYOPHILIZED, FOR SOLUTION INTRAVENOUS at 08:26

## 2017-09-29 RX ADMIN — SEVELAMER CARBONATE 1600 MG: 800 TABLET, FILM COATED ORAL at 08:49

## 2017-09-29 ASSESSMENT — PAIN DESCRIPTION - ORIENTATION: ORIENTATION: RIGHT

## 2017-09-29 ASSESSMENT — PAIN DESCRIPTION - LOCATION: LOCATION: CHEST

## 2017-09-29 ASSESSMENT — PAIN SCALES - GENERAL: PAINLEVEL_OUTOF10: 6

## 2017-09-29 NOTE — PLAN OF CARE
Problem: Discharge Planning  Intervention: Discharge to appropriate level of care  Out-patient dialysis schedule and Mircera dosing      Comments:   Upon discharge, anticipate patient to resume out-patient dialysis schedule with [facility] unit on Tues-Thurs-Sat at 0835. Mircera 100 mcg every 2 weeks with last dose given on 9-26-17  Dialysis staff will be updated of hospital admission. Will continue to follow.
Problem: Falls - Risk of  Goal: Absence of falls  Outcome: Ongoing  Falling star program. Bed alarm on. Call light within reach. Side rails up x2. Encouraged to use call system. Standby assist. Gait steady. Problem: Pain:  Goal: Pain level will decrease  Pain level will decrease   Outcome: Met This Shift  Pt pain free at this time. PRN medication available for pain management. Comments:   Care plan reviewed with patient.  Will review and discuss care plan with family when available
inpatient tobacco-use cessation counseling  Outcome: Ongoing  Patient is using nicotine patch while inpatient    Comments:   Care plan reviewed with patient and family. Patient and family verbalize understanding of the plan of care and contribute to goal setting.

## 2017-09-29 NOTE — CARE COORDINATION
17, 9:31 AM      Dinora Quinones       Admitted from:  ED 2017/ 1411 Hospital day: 1   Location: [facility] Reason for admit: Aortic dissection ([facility] [address] [I71.00] Status: IP  Admit order signed?: yes  PMH:  has a past medical history of AAA (abdominal aortic aneurysm) ([facility] [address] Acute on chronic diastolic CHF (congestive heart failure), NYHA class 2 ([address] NURIS (acute kidney injury) ([address] Anemia associated with chronic renal failure; Arthritis; Cocaine abuse; Depression; Diabetes mellitus ([facility] [address] Diastolic heart failure secondary to coronary artery disease (HCC); FSGS (focal segmental glomerulosclerosis); Hemodialysis patient [facility]); Hemorrhoids; History of blood transfusion; Hyperlipidemia; Hyperphosphatemia; Hypertension; Left renal artery stenosis ([facility] [address] Monoclonal (M) protein disease, multiple 'M' protein; Nicotine dependence; Noncompliance; Pneumonia; Psychiatric problem; PTSD (post-traumatic stress disorder); Secondary hyperparathyroidism (of renal origin); and Sleep apnea. Pertinent abnormal Imagin CTA chest   1. Essentially stable appearance of an aortic aneurysm within the aortic arch at the base of the left subclavian artery and extending into the abdominal aorta. The left renal artery again shown to originate from the false lumen. The other main branches    from the true lumen. 2. There  are new, patchy areas within the bilateral lungs and specifically the posterior aspect of the right lower lobe. These areas are suggestive of infectious infiltrates.  Repeat exam after treatment to document resolution would be beneficial.   3. Stable severe cardiomegaly     Medications:  Scheduled Meds:   azithromycin  500 mg Intravenous Q24H    cloNIDine  0.3 mg Oral TID    doxazosin  4 mg Oral Nightly    fluticasone  1 spray Nasal Daily    isosorbide mononitrate  120 mg Oral Daily    minoxidil  10 mg Oral BID    therapeutic multivitamin-minerals  1 tablet Oral Daily    atorvastatin  40 mg Oral Nightly    sertraline  50 mg Oral Daily    sevelamer  1,600 mg Oral TID WC    verapamil  240 mg Oral Daily    sodium chloride flush  10 mL Intravenous 2 times per day    famotidine  20 mg Oral Daily    nicotine  1 patch Transdermal Daily    metoprolol tartrate  25 mg Oral BID    aspirin  81 mg Oral Daily    heparin (porcine)  5,000 Units Subcutaneous BID     Continuous Infusions:    Pertinent Info/Orders/Treatment Plan:  +Cocaine and Opiates. Zithromax IV. Telemetry. Nephrology consult. HD T-Th-S  Diet: DIET CARDIAC;   DVT Prophylaxis: Heparin  Smoking status:  reports that he has been smoking Cigarettes. He started smoking about 31 years ago. He has a 5.00 pack-year smoking history. He has never used smokeless tobacco.   Influenza Vaccination Screening Completed: yes  Pneumonia Vaccination Screening Completed: yes  Core measures: Pneumonia Core measures:  Blood cultures prior to atb- no  Offer vaccines-yes  Education-added to discharge. [employer] education given to Sabi Flores RN     PCP: Cruz Reinoso MD  Readmission:   no  Risk Score: 10.5     Discharge Planning  Current Residence:  Private Residence  Living Arrangements:  Spouse/Significant Other   Support Systems:  Spouse/Significant Other  Current Services PTA:     Potential Assistance Needed:  N/A  Potential Assistance Purchasing Medications:  No  Does patient want to participate in local refill/ meds to beds program?  N/A  Type of Home Care Services:  None  Patient expects to be discharged to:  home  Expected Discharge date:  09/30/17  Follow Up Appointment: Best Day/ Time: Monday AM    Discharge Plan: Talked with pt. He plans return home with spouse. He has outpt dialysis at [facility] unit [facility] at [facility]  Evaluation: no     9/29/17, 12:59 PM    Discharge plan discussed by  and . Discharge plan reviewed with patient/ family.   Patient/ family verbalize understanding of discharge plan and are in agreement with

## 2017-09-29 NOTE — CONSULTS
data in the 24 hours ending 09/29/17 1006       Patient Vitals for the past 96 hrs (Last 3 readings):   Weight   09/28/17 2145 208 lb 12.8 oz (94.7 kg)   09/28/17 1428 220 lb (99.8 kg)          REVIEW OF SYSTEMS:   GENERAL:  Denies fever, General malaise  HEENT: Denies recent vision change, Denies Upper respiratory complaints  CARDIOVASCULAR: Denies angina. RESPIRATORY:Denies wheezing  ABDOMEN: Denies nausea, vomiting, diarrhea, or abdominal pain  CNS:Denies headache, dizziness,   MUSCULOSKELETAL: Reports joint arthralgia  SKIN: Denies rash, pruritis  HEMATOLOGIC: Denies bruising  ENDOCRINE:  Negative for heat or cold intolerance   EXAM:  VITALS:  BP (!) 140/88  Pulse 97  Temp 98.2 °F (36.8 °C) (Oral)   Resp 16  Ht 6' 3\" (1.905 m)  Wt 208 lb 12.8 oz (94.7 kg)  SpO2 92%  BMI 26.1 kg/m2   Body mass index is 26.1 kg/(m^2). Vitals:Patient Vitals for the past 6 hrs:   BP Temp Temp src Pulse Resp SpO2   09/29/17 0800 (!) 140/88 98.2 °F (36.8 °C) Oral 97 16 92 %   09/29/17 0431 (!) 171/80 99.1 °F (37.3 °C) Oral 104 16 94 %       Constitutional:  No acute distress. Skin: No rash on exposed surfaces, No significant bruises  Heent:  Head is normocephalic, Extraocular movement intact, Mucus membranes moist. Voice is clear without hoarseness or slurred speech   Neck: no jugular venous distention noted, Neck is supple  Cardiovascular:  S1, S2  regular rate and rhythm. Respiratory: Clear to ausculation bilaterally. Equal breath sounds, No crackles, No wheezes. No shortness of breath. Abdomen: Bowel sounds present, soft, non tender  Ext: Distal lower extremity temp is warm, No lower extremity edema. upper extremity AV Fistula, Rt upper extremity edema  Musculoskeletal: Movement is coordinated. Moves all extremities. No joint tenderness or erythema. Psychiatric: mood and affect appropriate  Neurological: Patient is alert and oriented to person, place, and time. Recent and remote   memory intact. Pt is attentive. Secondary renal hyperparathyroidism (HCC)    Cocaine abuse    Recurrent depression ([facility] [address]    Renal artery stenosis (HCC) with uncontrollable hypertension    Severe alcohol use disorder (HCC)    Severe cocaine use disorder (HCC)    LVH (left ventricular hypertrophy) due to hypertensive disease    Chronic diastolic heart failure (HCC)    Hypertrophic cardiomyopathy (HCC)    Diet-controlled diabetes mellitus ([facility] [address]    A-V fistula (HCC)    Hyperphosphatemia    Anemia in CKD (chronic kidney disease)    Vitamin D deficiency    Tobacco abuse    ESRD on hemodialysis (HCC)    ESRD (end stage renal disease) on dialysis (HCC)    Hyperglycemia    Elevated troponin    Discussed with Dr. Zain Goldberg. Patient was advised about impression and plan. Patient verbalizes understanding and agrees with plan of care.      Thank you Chula Bhakta MD  for allowing us to participate in care of Annika Huerta CNP 9/29/2017 10:06 AM

## 2017-09-29 NOTE — H&P
failure. Acute renal kidney injury. Acute on  chronic diastolic CHF class II. Abdominal aortic aneurysm. PAST SURGICAL HISTORY:  Abdominal mesh. AV fistula creation in  2016. Vascular surgery. Surgery on the Achilles tendon on the  right. Leg tendon surgery. Dialysis fistula creation in 2016. FAMILY HISTORY:  Unspecified cancer. SOCIAL HISTORY:  Current same day smoker of quarter pack a day  for 20 years and has smoked more than that in the past.  So, he  had more than a 5 pack-a-year history of smoking. Never used  smokeless tobacco.  Does drink 1.2 ounces of cans of beer  although the patient appears to have denied it. Does use  cocaine. HOME MEDICATIONS:  1. Azithromycin 250 mg 2 tablets on day one, 1 tablet on day two  through five. 2.  Cholecalciferol or vitamin D 2000 units capsule two times  daily. 3.  Clonidine or Catapres 0.3 mg tablet by mouth three times  daily. 4.  Doxazosin. 5.  Cardura 4 mg by mouth nightly. 6.  Fluticasone or Flonase 50 mcg 1 spray via nasal route daily. 7.  Guaifenesin or codeine, Tussi-Organidin 5 mL by mouth three  times daily as needed for cough. .  8.  Isosorbide mononitrate or Imdur 120 mg extended release  tablet. 9.  Minoxidil 10 mg by mouth twice daily. 10.  Multivitamins and minerals 1 tablet by mouth daily. 11.  Rosuvastatin or Crestor 10 mg by mouth nightly. 12.  Sertraline or Zoloft 50 mg by mouth daily. 13.  Sevelamer or Renvela 100 mg by mouth 2 tablets three times  daily. 14.  Verapamil or Calan  mg sustained release tablet by  mouth daily. ALLERGIES:  NO KNOWN DRUG ALLERGIES. REVIEW OF SYSTEMS:  GENERAL:  No fever, no chills, no fatigue, no diaphoresis, no  appetite change or activity change, no unexpected weight change. HEENT:  Congestion in the chest.  No ear discharge, ear pain,  hearing loss, rhinorrhea, sinus pressure, sore throat, trouble  swallowing or voice change.   Pulmonary:  He is having cough that is nonproductive. No phlegm  production. No hemoptysis. He has no choking. No chest  tightness. No shortness of . No wheezing. No stridor. CARDIOVASCULAR:  He is positive for chest pain with cough as  mentioned above. No palpitations, no leg swelling. GASTROINTESTINAL:  No nausea or vomiting. No hematemesis. No  diarrhea. No constipation. No melena. No hematochezia. No  abdominal distention. No abdominal pain. GENITOURINARY:  Decreased urine volume with difficulty urinating. No dysuria. No enuresis. No increased frequency. No hematuria. No urgency. MUSCULOSKELETAL:  No arthralgias, back pain, gait problem,  myalgias, neck pain or neck stiffness. NEUROLOGIC:  No dizziness, tremors, seizures, syncope, facial  asymmetry, weakness, lightheadedness, numbness or headaches. No  neurologic signs indicative of a stroke. All other systems reviewed are negative except as mentioned above  in HPI. PHYSICAL EXAMINATION:  General:   Alert and oriented x3, middle-aged gentleman in no  distress at the present moment. Coughing frequently. Well  developed, well nourished. Cooperative and easily aroused. Vital Signs:  Blood pressure 127/61 up to 158/90, most recently  157/84. Pulse is 89, up to 103. Most recently 103 and in sinus  tach on the monitor. Sinus tach. Regular rhythm. Respirations  are 16-21; most recently 12. He is saturating 92-98% on room  air. Temperature is 99.7 degrees Fahrenheit, which is the  highest it has been since he has been here. He weighs 94.7 kg or  208 pounds. He is 6 feet 3 inches tall. HEENT:  Trachea midline and mobile. No thyromegaly noted. NECK:  Neck is supple. Free range of motion. No meningismus or  nuchal rigidity. No vertebral tenderness. No JVD noted. Carotid pulses present. No bruits present. CHEST:  Somewhat barrel-chested. Equal excursion, bilateral  respiratory effort. No use of accessory muscles of respiration.    No respiratory the  cardiomediastinal silhouette. Cardiomediastinal silhouette  slightly more prominent, which is most likely related to portable  expiratory technique. There are new perihilar and right basilar  infiltrates. There is a left costophrenic angle suggests a small  amount of pleural fluid. There is no pulmonary vascular  congestion. No pneumothorax. No acute osseous abnormality. There are new left perihilar and right basilar infiltrates. A  small pleural effusion on the left. CTA of chest.  There is no evidence of acute periaortic hematoma. After contrast re-administration of the aortic dissection  extending from the aortic arch at the origin of the left  subclavian artery and going all the way down into the abdomen. Diameter of the aorta measures 6.2 and it is slightly larger  compared to the prior exam.  Dissection extended into the  abdomen. Heart size is stable. There is no gross abnormality. The pulmonary artery has proximal branches. No mediastinal,  axillary or hilar lymphadenopathy. There is no pericardial or  purulent pleural effusions. Small scattered, patchy densities  along the medial aspects of the bilateral lungs as well as within  the right lower lobe. These are new from prior exam.  No  pulmonary masses or nodules are noted. No suspicious osseous  lesions. The impression was stable appearance of an aortic  aneurysm within the aortic artery to the base of the left  subclavian artery any extending into the abdominal aorta  dissection _____. There are patchy areas within the bilateral  lungs specifically the posterior aspect of right lower lobe. He  is discharged. These areas were suggestive of infectious  infiltrates. Repeat exam after treatment is beneficial.    LABORATORY DATA:  Sodium 135. Potassium 4.6. Chloride 97, which  is slightly low.  _____ 30. BUN 16. Creatinine 5.1, which is  up. The estimated global filtration rate only 15. Anion gap is  8. Glucose is 100.

## 2017-09-29 NOTE — DISCHARGE SUMMARY
Discharge Summary    Patient:  Lyle Orr  YOB: 1965    MRN: [id number]   Acct: [de-identified]    Primary Care Physician: Anabelle Mixon MD    Admit date:  9/28/2017    Discharge date:   9/29/2017       Discharge Diagnoses:   Pneumonia due to organism  Principal Problem:    Pneumonia due to organism  Active Problems:    FSGS (focal segmental glomerulosclerosis)    Type II diabetes mellitus with end-stage renal disease ([address]    Monoclonal (M) protein disease, multiple 'M' protein    PTSD (post-traumatic stress disorder)    Secondary renal hyperparathyroidism (HCC)    Cocaine abuse    Recurrent depression ([address]    Renal artery stenosis (HCC) with uncontrollable hypertension    Severe alcohol use disorder (HCC)    Severe cocaine use disorder (HCC)    LVH (left ventricular hypertrophy) due to hypertensive disease    Chronic diastolic heart failure (HCC)    Hypertrophic cardiomyopathy ([address]    Diet-controlled diabetes mellitus ([facility]    A-V fistula (HCC)    Hyperphosphatemia    Anemia in CKD (chronic kidney disease)    Vitamin D deficiency    Tobacco abuse    ESRD on hemodialysis ([facility] [address]    ESRD (end stage renal disease) on dialysis ([address]    Hyperglycemia    Elevated troponin        Admitted for: (HPI)  The patient is a 72-year-old male  presents to the emergency department for evaluation of cough and  chest congestion. The patient states she has been experiencing  episodes of cough and chest congestion for roughly 2 weeks. He  states that he has also been experiencing chest pain when he  coughs. He rates it a 7/10 in severity. He states it is  intermittent in quality. The patient denies any fever, sinus  congestion, shortness of breath or chills. He states that he has  an appointment with Dr. Justus Miller recently without issue. He states  he is having difficulty sleeping due to his cough and he has a  decreased amount. He smokes a two cigarettes a day.   He has a  history of hypertension, cocaine abuse mouth daily       vitamin D 2000 units Caps capsule         STOP taking these medications          guaiFENesin-codeine 100-10 MG/5ML syrup   Commonly known as:  TUSSI-ORGANIDIN NR       MIRCERA 100 MCG/0.3ML Sosy   Generic drug:  Methoxy PEG-Epoetin Beta               Physical Exam:    Vitals:  Vitals:    09/28/17 2145 09/29/17 0431 09/29/17 0800 09/29/17 1115   BP: (!) 157/84 (!) 171/80 (!) 140/88 (!) 105/56   Pulse: 103 104 97 79   Resp: 16 16 16 17   Temp: 99.7 °F (37.6 °C) 99.1 °F (37.3 °C) 98.2 °F (36.8 °C) 98.1 °F (36.7 °C)   TempSrc: Oral Oral Oral Oral   SpO2: 92% 94% 92% 94%   Weight: 208 lb 12.8 oz (94.7 kg)      Height: 6' 3\" (1.905 m)        Weight: Weight: 208 lb 12.8 oz (94.7 kg)     24 hour intake/output:No intake or output data in the 24 hours ending 09/29/17 1249    General appearance - alert awake appears to be in no acute distress  Chest - Bilateral air entry, no wheeze  Heart - S1S2 RRR, no murmurs or gallops  Abdomen - soft, non tender, normoactive bowel sounds  Neurological - non focal  Extremities - no edema  Skin - no rashes or lesions    Procedures:  na    Diagnostic Test:  CTA chest    Radiology reports as per the Radiologist  Radiology:  Xr Chest Standard (2 Vw)    Result Date: 9/26/2017  PROCEDURE: XR CHEST STANDARD TWO VW CLINICAL INFORMATION: cough, chest pain, . COMPARISON: Radiograph of the chest dated December 24, 2016 TECHNIQUE: PA and lateral views the chest. FINDINGS: The heart is again noted to be enlarged. The aorta is slightly unfolded however stable in appearance. The lungs are clear without focal consolidation or effusion. The pulmonary vascularity is within normal limits. No suspicious osseous lesions are identified. Stable radiographic appearance of the chest. No evidence of an acute intrathoracic process. **This report has been created using voice recognition software. It may contain minor errors which are inherent in voice recognition technology. ** Final report electronically signed by Dr. Trino Carter on 9/26/2017 11:22 PM    Cta Chest W Wo Contrast    Result Date: 9/29/2017  PROCEDURE: CTA CHEST W WO CONTRAST CLINICAL INFORMATION: patient with chest pain, history of aortic aneurysm, patient cleared from nephrology. COMPARISON: CTA of the chest dated October 8, 2016. TECHNIQUE: 3mm axial images were obtained through the aorta after the administration of 80 mL Isovue-370 intravenous contrast.  Pre-contrast axial images were also obtained. 3D reconstructions were performed on the scanner to include sagittal and coronal MIP reconstructions through the aorta. All CT scans at this facility use dose modulation, iterative reconstruction, and/or weight-based dosing when appropriate to reduce radiation dose to as low as reasonably achievable. FINDINGS: On the noncontrast images, there is no evidence of an acute periaortic hematoma. No intramural hematoma. After contrast, there is redemonstration of an aortic dissection extending from the aortic arch at the origin of the left subclavian artery. The diameter of the aorta measures 5.5 cm and is slightly larger by a few millimeters compared to the prior exam.  The dissection extends into the abdomen. Unchanged from prior study is the origin of the celiac and the SMA which are from the true lumen as well as the origin of the right renal artery. The origin of the left renal artery is from the false lumen. The dissection extends distally. The exam and prior to the bifurcation of the abdominal aorta. There is no leakage of IV contrast outside the walls of the aorta to indicate a rupture. The heart size is stable. There is no gross abnormality of the pulmonary artery and its proximal branches. There is no mediastinal, axillary or hilar adenopathy. There is no pericardial or pleural effusion. There are scattered, patchy densities along the anterior medial aspects of the bilateral lungs as well as within the right lower lobe.  These are new from the prior exam. No pulmonary masses or nodules are noted. No suspicious osseous lesions are present. 1. Essentially stable appearance of an aortic aneurysm within the aortic arch at the base of the left subclavian artery and extending into the abdominal aorta. The left renal artery again shown to originate from the false lumen. The other main branches from the true lumen. The 2. There  are new, patchy areas within the bilateral lungs and specifically the posterior aspect of the right lower lobe. These areas are suggestive of infectious infiltrates. Repeat exam after treatment to document resolution would be beneficial. 3. Stable severe cardiomegaly. **This report has been created using voice recognition software. It may contain minor errors which are inherent in voice recognition technology. **  Final report electronically signed by Dr. Chela Edwards on 9/29/2017 8:28 AM    Xr Chest Portable    Result Date: 9/28/2017  PROCEDURE: XR CHEST PORTABLE CLINICAL INFORMATION: Chest pain; technologist notes state right-sided chest pain and shortness of breath for 2 days. COMPARISON: 9/26/2017. TECHNIQUE: AP portable chest radiograph performed. FINDINGS: POSTSURGICAL CHANGES: None. LINES/TUBES/MECHANICAL DEVICES: None. TRACHEA/HEART/MEDIASTINUM/HILUM: 1. There is moderate enlargement/magnification of the cardiomediastinal silhouette. The cardiomediastinal silhouette is slightly more prominent which most likely is related to the portable expiratory technique. LUNG FIELDS: 1. There are new left perihilar and right basilar infiltrates. 2. There is blunting of the left lateral costophrenic angle suggesting a small amount of pleural fluid. 3. There is no pulmonary vascular congestion. OTHER: None. PNEUMOTHORAX:  None. OSSEOUS STRUCTURES: 1. No acute osseous abnormality. 1. There are new left perihilar and right basilar infiltrates.  2. There is blunting of the left lateral costophrenic angle suggesting a small Value Ref Range    Osmolality Calc 271.4 (L) 275.0 - 300 mOsmol/kg   Glomerular Filtration Rate, Estimated   Result Value Ref Range    Est, Glom Filt Rate 15 (A) ml/min/1.73m2   Troponin   Result Value Ref Range    Troponin T 0.061 (A) ng/ml   Urine Drug Screen   Result Value Ref Range    AMPHETAMINE+METHAMPHETAMINE URINE SCREEN Negative NEGATIVE    Barbiturate Quant, Ur Negative NEGATIVE    Benzodiazepine Quant, Ur Negative NEGATIVE    Cannabinoid Quant, Ur Negative NEGATIVE    Cocaine Metab Quant, Ur POSITIVE NEGATIVE    Opiates, Urine POSITIVE NEGATIVE    Oxycodone Negative NEGATIVE    PCP Quant, Ur Negative NEGATIVE   Procalcitonin   Result Value Ref Range    Procalcitonin 1.47 (H) 0.01 - 0.09 ng/mL   Comprehensive metabolic panel   Result Value Ref Range    Glucose 93 70 - 108 mg/dL    CREATININE 7.4 (HH) 0.4 - 1.2 mg/dL    BUN 30 (H) 7 - 22 mg/dL    Sodium 137 135 - 145 meq/L    Potassium 5.6 (H) 3.5 - 5.2 meq/L    Chloride 96 (L) 98 - 111 meq/L    CO2 28 23 - 33 meq/L    Calcium 10.0 8.5 - 10.5 mg/dL    AST 8 5 - 40 U/L    Alkaline Phosphatase 58 38 - 126 U/L    Total Protein 6.6 6.1 - 8.0 g/dL    Alb 3.5 3.5 - 5.1 g/dL    Total Bilirubin 0.3 0.3 - 1.2 mg/dL    ALT 6 (L) 11 - 66 U/L   Lactic acid, plasma   Result Value Ref Range    Lactic Acid 0.5 0.5 - 2.2 mmol/L   Magnesium   Result Value Ref Range    Magnesium 2.3 1.6 - 2.4 mg/dL   Troponin   Result Value Ref Range    Troponin T 0.055 (A) ng/ml   Troponin   Result Value Ref Range    Troponin T 0.045 (A) ng/ml   CBC auto differential   Result Value Ref Range    WBC 5.3 4.8 - 10.8 thou/mm3    RBC 3.39 (L) 4.70 - 6.10 mill/mm3    Hemoglobin 10.7 (L) 14.0 - 18.0 gm/dl    Hematocrit 32.2 (L) 42.0 - 52.0 %    MCV 94.9 (H) 80.0 - 94.0 fL    MCH 31.7 (H) 27.0 - 31.0 pg    MCHC 33.4 33.0 - 37.0 gm/dl    RDW 15.0 (H) 11.5 - 14.5 %    Platelets 553 571 - 092 thou/mm3    MPV 7.6 7.4 - 10.4 mcm    RBC Morphology NORMAL     Seg Neutrophils 70.3 %    Lymphocytes 11.4 %

## 2017-09-29 NOTE — PROGRESS NOTES
Pharmacy Renal Adjustment Per P&T Protocol    Daniela Pérez is a 50 y.o. male. Pharmacy has renally adjusted famotidine per P&T Protocol. Recent Labs      09/26/17   2222  09/28/17   1444   BUN  28*  16       Recent Labs      09/26/17   2222  09/28/17   1444   CREATININE  6.9*  5.1*       Estimated Creatinine Clearance: 21 mL/min (based on Cr of 5.1). Height:   Ht Readings from Last 1 Encounters:   09/28/17 6' 3\" (1.905 m)     Weight:  Wt Readings from Last 1 Encounters:   09/28/17 208 lb 12.8 oz (94.7 kg)       Plan: Adjust the following medications based on renal function:           Changed to daily    Yumiko Goldberg, [address]  9/28/2017  10:34 PM

## 2017-09-29 NOTE — PROGRESS NOTES
CLINICAL PHARMACY: DISCHARGE MED RECONCILIATION/REVIEW    [address] Patient?: No  Total # of Interventions Recommended: 0   - Increased Dose #: 0  Total # Interventions Accepted: 0  Intervention Severity:   - Level 1 Intervention Present?: No   - Level 2 #: 0   - Level 3 #: 0   Time Spent (min): Thang Hou PharmD 9/29/2017 1:30 PM

## 2017-10-01 ENCOUNTER — NURSE TRIAGE (OUTPATIENT)
Dept: ADMINISTRATIVE | Age: 52
End: 2017-10-01

## 2017-10-04 ENCOUNTER — HOSPITAL ENCOUNTER (INPATIENT)
Age: 52
LOS: 1 days | Discharge: HOME | DRG: 291 | End: 2017-10-05
Attending: FAMILY MEDICINE | Admitting: INTERNAL MEDICINE
Payer: MEDICARE

## 2017-10-04 ENCOUNTER — APPOINTMENT (OUTPATIENT)
Dept: CT IMAGING | Age: 52
DRG: 291 | End: 2017-10-04
Payer: MEDICARE

## 2017-10-04 ENCOUNTER — APPOINTMENT (OUTPATIENT)
Dept: GENERAL RADIOLOGY | Age: 52
DRG: 291 | End: 2017-10-04
Payer: MEDICARE

## 2017-10-04 DIAGNOSIS — E87.5 HYPERKALEMIA: ICD-10-CM

## 2017-10-04 DIAGNOSIS — Z99.2 END STAGE RENAL DISEASE ON DIALYSIS (HCC): ICD-10-CM

## 2017-10-04 DIAGNOSIS — N18.6 END STAGE RENAL DISEASE ON DIALYSIS (HCC): ICD-10-CM

## 2017-10-04 DIAGNOSIS — I16.0 HYPERTENSIVE URGENCY: Primary | ICD-10-CM

## 2017-10-04 DIAGNOSIS — I71.019 CHRONIC THORACIC AORTIC DISSECTION: ICD-10-CM

## 2017-10-04 LAB
ANION GAP SERPL CALCULATED.3IONS-SCNC: 17 MEQ/L (ref 8–16)
ANISOCYTOSIS: ABNORMAL
BASOPHILS # BLD: 0.1 %
BASOPHILS ABSOLUTE: 0 THOU/MM3 (ref 0–0.1)
BLOOD CULTURE, ROUTINE: NORMAL
BLOOD CULTURE, ROUTINE: NORMAL
BUN BLDV-MCNC: 69 MG/DL (ref 7–22)
CALCIUM SERPL-MCNC: 10.7 MG/DL (ref 8.5–10.5)
CHLORIDE BLD-SCNC: 99 MEQ/L (ref 98–111)
CO2: 27 MEQ/L (ref 23–33)
CREAT SERPL-MCNC: 15 MG/DL (ref 0.4–1.2)
EOSINOPHIL # BLD: 1.8 %
EOSINOPHILS ABSOLUTE: 0.2 THOU/MM3 (ref 0–0.4)
GFR SERPL CREATININE-BSD FRML MDRD: 4 ML/MIN/1.73M2
GLUCOSE BLD-MCNC: 105 MG/DL (ref 70–108)
HCT VFR BLD CALC: 32 % (ref 42–52)
HEMOGLOBIN: 10.6 GM/DL (ref 14–18)
LYMPHOCYTES # BLD: 7.7 %
LYMPHOCYTES ABSOLUTE: 1 THOU/MM3 (ref 1–4.8)
MCH RBC QN AUTO: 31.2 PG (ref 27–31)
MCHC RBC AUTO-ENTMCNC: 32.9 GM/DL (ref 33–37)
MCV RBC AUTO: 94.7 FL (ref 80–94)
MONOCYTES # BLD: 5.6 %
MONOCYTES ABSOLUTE: 0.7 THOU/MM3 (ref 0.4–1.3)
NUCLEATED RED BLOOD CELLS: 0 /100 WBC
OSMOLALITY CALCULATION: 305.5 MOSMOL/KG (ref 275–300)
PDW BLD-RTO: 16 % (ref 11.5–14.5)
PLATELET # BLD: 283 THOU/MM3 (ref 130–400)
PMV BLD AUTO: 6.5 MCM (ref 7.4–10.4)
POTASSIUM SERPL-SCNC: 5.4 MEQ/L (ref 3.5–5.2)
RBC # BLD: 3.38 MILL/MM3 (ref 4.7–6.1)
RBC # BLD: NORMAL 10*6/UL
SEG NEUTROPHILS: 84.8 %
SEGMENTED NEUTROPHILS ABSOLUTE COUNT: 10.7 THOU/MM3 (ref 1.8–7.7)
SODIUM BLD-SCNC: 143 MEQ/L (ref 135–145)
WBC # BLD: 12.6 THOU/MM3 (ref 4.8–10.8)

## 2017-10-04 PROCEDURE — 90935 HEMODIALYSIS ONE EVALUATION: CPT | Performed by: INTERNAL MEDICINE

## 2017-10-04 PROCEDURE — 85025 COMPLETE CBC W/AUTO DIFF WBC: CPT

## 2017-10-04 PROCEDURE — 90935 HEMODIALYSIS ONE EVALUATION: CPT

## 2017-10-04 PROCEDURE — 6370000000 HC RX 637 (ALT 250 FOR IP): Performed by: PHYSICIAN ASSISTANT

## 2017-10-04 PROCEDURE — 6370000000 HC RX 637 (ALT 250 FOR IP): Performed by: FAMILY MEDICINE

## 2017-10-04 PROCEDURE — 93005 ELECTROCARDIOGRAM TRACING: CPT | Performed by: FAMILY MEDICINE

## 2017-10-04 PROCEDURE — 99221 1ST HOSP IP/OBS SF/LOW 40: CPT | Performed by: INTERNAL MEDICINE

## 2017-10-04 PROCEDURE — 36415 COLL VENOUS BLD VENIPUNCTURE: CPT

## 2017-10-04 PROCEDURE — 96374 THER/PROPH/DIAG INJ IV PUSH: CPT

## 2017-10-04 PROCEDURE — 71250 CT THORAX DX C-: CPT

## 2017-10-04 PROCEDURE — 99223 1ST HOSP IP/OBS HIGH 75: CPT | Performed by: PHYSICIAN ASSISTANT

## 2017-10-04 PROCEDURE — 80048 BASIC METABOLIC PNL TOTAL CA: CPT

## 2017-10-04 PROCEDURE — 2500000003 HC RX 250 WO HCPCS: Performed by: FAMILY MEDICINE

## 2017-10-04 PROCEDURE — 5A1D70Z PERFORMANCE OF URINARY FILTRATION, INTERMITTENT, LESS THAN 6 HOURS PER DAY: ICD-10-PCS | Performed by: INTERNAL MEDICINE

## 2017-10-04 PROCEDURE — 1200000003 HC TELEMETRY R&B

## 2017-10-04 PROCEDURE — 71010 XR CHEST PORTABLE: CPT

## 2017-10-04 PROCEDURE — 2500000003 HC RX 250 WO HCPCS: Performed by: INTERNAL MEDICINE

## 2017-10-04 PROCEDURE — 99285 EMERGENCY DEPT VISIT HI MDM: CPT

## 2017-10-04 PROCEDURE — 2580000003 HC RX 258: Performed by: PHYSICIAN ASSISTANT

## 2017-10-04 PROCEDURE — 94640 AIRWAY INHALATION TREATMENT: CPT

## 2017-10-04 RX ORDER — SODIUM POLYSTYRENE SULFONATE 15 G/60ML
15 SUSPENSION ORAL; RECTAL ONCE
Status: COMPLETED | OUTPATIENT
Start: 2017-10-04 | End: 2017-10-04

## 2017-10-04 RX ORDER — IPRATROPIUM BROMIDE AND ALBUTEROL SULFATE 2.5; .5 MG/3ML; MG/3ML
1 SOLUTION RESPIRATORY (INHALATION) ONCE
Status: COMPLETED | OUTPATIENT
Start: 2017-10-04 | End: 2017-10-04

## 2017-10-04 RX ORDER — ONDANSETRON 2 MG/ML
4 INJECTION INTRAMUSCULAR; INTRAVENOUS EVERY 6 HOURS PRN
Status: DISCONTINUED | OUTPATIENT
Start: 2017-10-04 | End: 2017-10-05 | Stop reason: HOSPADM

## 2017-10-04 RX ORDER — SODIUM CHLORIDE 0.9 % (FLUSH) 0.9 %
10 SYRINGE (ML) INJECTION EVERY 12 HOURS SCHEDULED
Status: DISCONTINUED | OUTPATIENT
Start: 2017-10-04 | End: 2017-10-05 | Stop reason: HOSPADM

## 2017-10-04 RX ORDER — ACETAMINOPHEN 325 MG/1
650 TABLET ORAL EVERY 4 HOURS PRN
Status: DISCONTINUED | OUTPATIENT
Start: 2017-10-04 | End: 2017-10-05 | Stop reason: HOSPADM

## 2017-10-04 RX ORDER — HEPARIN SODIUM 5000 [USP'U]/ML
5000 INJECTION, SOLUTION INTRAVENOUS; SUBCUTANEOUS EVERY 8 HOURS SCHEDULED
Status: DISCONTINUED | OUTPATIENT
Start: 2017-10-04 | End: 2017-10-05 | Stop reason: HOSPADM

## 2017-10-04 RX ORDER — SODIUM CHLORIDE 0.9 % (FLUSH) 0.9 %
10 SYRINGE (ML) INJECTION PRN
Status: DISCONTINUED | OUTPATIENT
Start: 2017-10-04 | End: 2017-10-05 | Stop reason: HOSPADM

## 2017-10-04 RX ORDER — FLUTICASONE PROPIONATE 50 MCG
1 SPRAY, SUSPENSION (ML) NASAL 2 TIMES DAILY
Status: DISCONTINUED | OUTPATIENT
Start: 2017-10-04 | End: 2017-10-05 | Stop reason: HOSPADM

## 2017-10-04 RX ORDER — DOXAZOSIN MESYLATE 4 MG/1
4 TABLET ORAL NIGHTLY
Status: DISCONTINUED | OUTPATIENT
Start: 2017-10-04 | End: 2017-10-05 | Stop reason: HOSPADM

## 2017-10-04 RX ORDER — IPRATROPIUM BROMIDE AND ALBUTEROL SULFATE 2.5; .5 MG/3ML; MG/3ML
1 SOLUTION RESPIRATORY (INHALATION)
Status: DISCONTINUED | OUTPATIENT
Start: 2017-10-04 | End: 2017-10-05 | Stop reason: HOSPADM

## 2017-10-04 RX ORDER — LABETALOL HYDROCHLORIDE 5 MG/ML
20 INJECTION, SOLUTION INTRAVENOUS ONCE
Status: COMPLETED | OUTPATIENT
Start: 2017-10-04 | End: 2017-10-04

## 2017-10-04 RX ORDER — VERAPAMIL HYDROCHLORIDE 240 MG/1
240 TABLET, FILM COATED, EXTENDED RELEASE ORAL DAILY
Status: DISCONTINUED | OUTPATIENT
Start: 2017-10-04 | End: 2017-10-05 | Stop reason: HOSPADM

## 2017-10-04 RX ORDER — CLONIDINE HYDROCHLORIDE 0.2 MG/1
0.2 TABLET ORAL ONCE
Status: COMPLETED | OUTPATIENT
Start: 2017-10-04 | End: 2017-10-04

## 2017-10-04 RX ORDER — METOPROLOL TARTRATE 5 MG/5ML
5 INJECTION INTRAVENOUS ONCE
Status: COMPLETED | OUTPATIENT
Start: 2017-10-04 | End: 2017-10-04

## 2017-10-04 RX ORDER — SEVELAMER CARBONATE 800 MG/1
1600 TABLET, FILM COATED ORAL
Status: DISCONTINUED | OUTPATIENT
Start: 2017-10-04 | End: 2017-10-05 | Stop reason: HOSPADM

## 2017-10-04 RX ORDER — FAMOTIDINE 20 MG/1
20 TABLET, FILM COATED ORAL DAILY
Status: DISCONTINUED | OUTPATIENT
Start: 2017-10-04 | End: 2017-10-05 | Stop reason: HOSPADM

## 2017-10-04 RX ORDER — MINOXIDIL 2.5 MG/1
10 TABLET ORAL 2 TIMES DAILY
Status: DISCONTINUED | OUTPATIENT
Start: 2017-10-04 | End: 2017-10-05 | Stop reason: HOSPADM

## 2017-10-04 RX ORDER — ISOSORBIDE MONONITRATE 60 MG/1
120 TABLET, EXTENDED RELEASE ORAL DAILY
Status: DISCONTINUED | OUTPATIENT
Start: 2017-10-04 | End: 2017-10-05 | Stop reason: HOSPADM

## 2017-10-04 RX ADMIN — ACETAMINOPHEN 650 MG: 325 TABLET ORAL at 19:54

## 2017-10-04 RX ADMIN — FAMOTIDINE 20 MG: 20 TABLET, FILM COATED ORAL at 19:53

## 2017-10-04 RX ADMIN — FLUTICASONE PROPIONATE 1 SPRAY: 50 SPRAY, METERED NASAL at 19:54

## 2017-10-04 RX ADMIN — CLONIDINE HYDROCHLORIDE 0.2 MG: 0.2 TABLET ORAL at 13:16

## 2017-10-04 RX ADMIN — SEVELAMER CARBONATE 1600 MG: 800 TABLET, FILM COATED ORAL at 19:53

## 2017-10-04 RX ADMIN — VERAPAMIL HYDROCHLORIDE 240 MG: 240 TABLET, FILM COATED, EXTENDED RELEASE ORAL at 19:53

## 2017-10-04 RX ADMIN — CLONIDINE HYDROCHLORIDE 0.2 MG: 0.2 TABLET ORAL at 10:24

## 2017-10-04 RX ADMIN — CLONIDINE HYDROCHLORIDE 0.3 MG: 0.2 TABLET ORAL at 19:53

## 2017-10-04 RX ADMIN — METOPROLOL TARTRATE 5 MG: 5 INJECTION INTRAVENOUS at 11:40

## 2017-10-04 RX ADMIN — ISOSORBIDE MONONITRATE 120 MG: 60 TABLET ORAL at 19:53

## 2017-10-04 RX ADMIN — IPRATROPIUM BROMIDE AND ALBUTEROL SULFATE 1 AMPULE: .5; 3 SOLUTION RESPIRATORY (INHALATION) at 10:32

## 2017-10-04 RX ADMIN — Medication 10 ML: at 19:54

## 2017-10-04 RX ADMIN — SODIUM POLYSTYRENE SULFONATE 15 G: 15 SUSPENSION ORAL; RECTAL at 12:59

## 2017-10-04 RX ADMIN — LABETALOL HYDROCHLORIDE 20 MG: 5 INJECTION, SOLUTION INTRAVENOUS at 18:26

## 2017-10-04 RX ADMIN — SERTRALINE 50 MG: 50 TABLET, FILM COATED ORAL at 19:53

## 2017-10-04 ASSESSMENT — PAIN DESCRIPTION - PAIN TYPE
TYPE: ACUTE PAIN
TYPE: ACUTE PAIN

## 2017-10-04 ASSESSMENT — PAIN SCALES - GENERAL
PAINLEVEL_OUTOF10: 3
PAINLEVEL_OUTOF10: 0
PAINLEVEL_OUTOF10: 5
PAINLEVEL_OUTOF10: 0

## 2017-10-04 ASSESSMENT — ENCOUNTER SYMPTOMS
EYE DISCHARGE: 0
VOMITING: 0
ABDOMINAL PAIN: 0
COUGH: 1
NAUSEA: 0
SHORTNESS OF BREATH: 1

## 2017-10-04 ASSESSMENT — PAIN DESCRIPTION - ONSET: ONSET: SUDDEN

## 2017-10-04 ASSESSMENT — PAIN DESCRIPTION - DESCRIPTORS: DESCRIPTORS: CONSTANT;JABBING

## 2017-10-04 ASSESSMENT — PAIN DESCRIPTION - FREQUENCY: FREQUENCY: CONTINUOUS

## 2017-10-04 ASSESSMENT — PAIN DESCRIPTION - ORIENTATION: ORIENTATION: RIGHT

## 2017-10-04 ASSESSMENT — PAIN DESCRIPTION - LOCATION
LOCATION: RIB CAGE
LOCATION: RIB CAGE

## 2017-10-04 NOTE — ED PROVIDER NOTES
[facility]  eMERGENCY dEPARTMENT eNCOUnter          CHIEF COMPLAINT       Chief Complaint   Patient presents with    URI    Cough       Nurses Notes reviewed and I agree except as noted in the HPI. HISTORY OF PRESENT ILLNESS    Buddy Hill is a 50 y.o. male who is a chronic smoker who presents to the Emergency Department for the evaluation of chest congestion and cough. He feels that his chest congestion started approximately a week and a half ago. He was evaluated previously for these symptoms and just finished a regimen of Azithomycin today. He admits to a productive cough with dark sputum but no hemoptysis. He denies any nasal congestion, fever, nausea, vomiting, abdominal pain or any other signs or symptoms at this time. Anne-Marie Paredes He states that he is feeling better since he has been on antibiotics. The patient is in end stage renal failure, and has dialysis 3 times a week. He also has a history of hypertension which is usually controlled with medication, and takes 2 puffs of albuterol three times a day. He denies having a history of diabetes mellitus, or currently having a primary care provider. His nephrologist is Dr. Aureliano Pop. Location/Symptom: chest congestion  Timing/Onset: week and a half   Context/Setting: random onset  Quality: productive cough  Duration: continuous  Modifying Factors: better with Azithromycin  Severity: mild    The HPI was provided by the patient. REVIEW OF SYSTEMS     Review of Systems   Constitutional: Negative for chills, diaphoresis and fever. HENT: Positive for congestion. Eyes: Negative for discharge. Respiratory: Positive for cough and shortness of breath. Scant sputum production   Cardiovascular: Negative for chest pain. History of thoracic aortic dissection    History of hypertension    He did take his morning meds for blood pressure other than Catapres   Gastrointestinal: Negative for abdominal pain, nausea and vomiting. daily    MULTIPLE VITAMINS-MINERALS (THERAPEUTIC MULTIVITAMIN-MINERALS) TABLET    Take 1 tablet by mouth daily    SERTRALINE (ZOLOFT) 50 MG TABLET    Take 1 tablet by mouth daily    SEVELAMER (RENVELA) 800 MG TABLET    Take 2 tablets by mouth 3 times daily (with meals)    VERAPAMIL (CALAN SR) 240 MG EXTENDED RELEASE TABLET    Take 1 tablet by mouth daily       ALLERGIES     has No Known Allergies. FAMILY HISTORY     indicated that his mother is . He indicated that his father is . He indicated that his brother is . family history includes Cancer in his mother; Other in his brother. SOCIAL HISTORY      reports that he has been smoking Cigarettes. He started smoking about 32 years ago. He has a 5.00 pack-year smoking history. He has never used smokeless tobacco. He reports that he drinks about 1.2 oz of alcohol per week  He reports that he uses illicit drugs, including Cocaine. PHYSICAL EXAM     INITIAL VITALS:  temperature is 98.3 °F (36.8 °C). His blood pressure is 180/99 (abnormal) and his pulse is 95. His respiration is 16 and oxygen saturation is 99%. Physical Exam   Constitutional: He is oriented to person, place, and time. He appears well-developed and well-nourished. GCS 15    Not toxic   HENT:   Head: Normocephalic and atraumatic. Ear canals clear TMs normal    Bilateral nasal obstruction    Throat normal   Eyes: Conjunctivae and EOM are normal. Pupils are equal, round, and reactive to light. No scleral icterus. Neck: Normal range of motion. Neck supple. No JVD present. Cardiovascular: Normal rate, regular rhythm and intact distal pulses. Pulmonary/Chest: Effort normal and breath sounds normal. He has no wheezes. Dry cough   Abdominal: Soft. Bowel sounds are normal. There is no tenderness. There is no rebound and no guarding. Musculoskeletal: Normal range of motion. He exhibits no edema. Lymphadenopathy:     He has no cervical adenopathy.    Neurological: 10/4/2017 10:58 AM          LABS:   Labs Reviewed   CBC WITH AUTO DIFFERENTIAL - Abnormal; Notable for the following:        Result Value    WBC 12.6 (*)     RBC 3.38 (*)     Hemoglobin 10.6 (*)     Hematocrit 32.0 (*)     MCV 94.7 (*)     MCH 31.2 (*)     MCHC 32.9 (*)     RDW 16.0 (*)     MPV 6.5 (*)     Segs Absolute 10.7 (*)     All other components within normal limits   BASIC METABOLIC PANEL - Abnormal; Notable for the following:     Potassium 5.4 (*)     BUN 69 (*)     CREATININE 15.0 (*)     Calcium 10.7 (*)     All other components within normal limits   ANION GAP - Abnormal; Notable for the following: Anion Gap 17.0 (*)     All other components within normal limits   OSMOLALITY - Abnormal; Notable for the following:     Osmolality Calc 305.5 (*)     All other components within normal limits   GLOMERULAR FILTRATION RATE, ESTIMATED - Abnormal; Notable for the following:     Est, Glom Filt Rate 4 (*)     All other components within normal limits   URINE DRUG SCREEN       EMERGENCY DEPARTMENT COURSE:   Vitals:    Vitals:    10/04/17 1018 10/04/17 1121 10/04/17 1255 10/04/17 1316   BP: (S) (!) 203/114 (!) 199/114 (!) 192/110 (!) 180/99   Pulse: 108 100 95    Resp: 20 16 16    Temp: 98.3 °F (36.8 °C)  98.3 °F (36.8 °C)    TempSrc: Oral      SpO2: 98% 98% 99%        10:23 AM: The patient was seen and evaluated.     Nursing notes reviewed    The patient doesn't have chest pain based pressure is elevated    Catapres by mouth    Record review indicates he's had a thoracic aortic dissection which has been stable during last admission September 2017    Given 5 mg Lopressor IV    Scan the chest without contrast and it shows the aneurysm is stable and his right lower lobe infiltrate is slightly better    He just completed Zithromax yesterday    No additional antibiotics administered    Pressure improved to 180/99    Given 1 additional dose of Catapres    We'll admit for hypertensive urgency    He has had cocaine abuse in the past which he denies taking any cocaine since discharge in the hospital however urine drug screen is pending           CRITICAL CARE:   none     CONSULTS:  Dr. Reji Adams PAC    PROCEDURES:  none     FINAL IMPRESSION      1. Hypertensive urgency    2. End stage renal disease on dialysis ([facility] [address]    3. Hyperkalemia    4. Chronic thoracic aortic dissection [facility])          DISPOSITION/PLAN   Admit      PATIENT REFERRED TO:  Dianna Beckham MD  [facility], [address]  [place] [id number]            DISCHARGE MEDICATIONS:  New Prescriptions    No medications on file       (Please note that portions of this note were completed with a voice recognition program.  Efforts were made to edit the dictations but occasionally words are mis-transcribed.)    Scribe:  Katalina Gordon 10/4/17 10:23 AM Scribing for and in the presence of Mily Dunn MD.    Signed by: Paty Wilson, 10/04/17 1:21 PM    Provider:  I personally performed the services described in the documentation, reviewed and edited the documentation which was dictated to the scribe in my presence, and it accurately records my words and actions.     Mily Dunn MD 10/4/17 1:21 PM         Mily Dunn MD  10/04/17 [address] Coats MD  10/04/17 1321

## 2017-10-04 NOTE — IP AVS SNAPSHOT
After Visit Summary  (Discharge Instructions)    Medication List for Home    Based on the information you provided to us as well as any changes during this visit, the following is your updated medication list.  Compare this with your prescription bottles at home. If you have any questions or concerns, contact your primary care physician's office.              Daily Medication List (This medication list can be shared with any healthcare provider who is helping you manage your medications)      These are medications you told us you were taking at home, CONTINUE taking them after you leave the hospital        Last Dose    Next Dose Due AM NOON PM NIGHT    cloNIDine 0.3 MG tablet   Commonly known as:  CATAPRES   Take 1 tablet by mouth three times daily                0.3 mg on 10/5/2017  1:48 PM     This afternoon                   2PM              doxazosin 4 MG tablet   Commonly known as:  CARDURA   Take 1 tablet by mouth nightly                4 mg on 10/5/2017 12:10 AM     Tonight                           famotidine 20 MG tablet   Commonly known as:  PEPCID   Take 20 mg by mouth daily                20 mg on 10/5/2017  1:47 PM     Tonight                            fluticasone 50 MCG/ACT nasal spray   Commonly known as:  FLONASE   1 spray by Nasal route 2 times daily                1 spray on 10/4/2017  7:54 PM     Tonight                              isosorbide mononitrate 120 MG extended release tablet   Commonly known as:  IMDUR   Take 1 tablet by mouth daily                120 mg on 10/5/2017  7:51 AM     10/06/17                            minoxidil 10 MG tablet   Commonly known as:  Lucila Police   One tab twice daily                10 mg on 10/5/2017  7:51 AM     Tonight                              sertraline 50 MG tablet   Commonly known as:  ZOLOFT   Take 1 tablet by mouth daily                50 mg on 10/5/2017  1:47 PM     10/06/17                            sevelamer 800 MG tablet Commonly known as:  RENVELA   Take 2 tablets by mouth 3 times daily (with meals)                1,600 mg on 10/5/2017  1:48 PM     Today with supper                      5PM           therapeutic multivitamin-minerals tablet   Take 1 tablet by mouth daily                  10/06/17                            verapamil 240 MG extended release tablet   Commonly known as:  CALAN SR   Take 1 tablet by mouth daily                240 mg on 10/5/2017  7:51 AM     10/06/17                            vitamin D 2000 units Caps capsule   Take by mouth 2 times daily                  Tonight                                      Allergies as of 10/5/2017     No Known Allergies      Immunizations as of 10/5/2017  Reviewed on 10/4/2017    Name Date Dose VIS Date Route    Influenza Virus Vaccine 10/10/2015 0.5 mL 8/7/2015 Intramuscular    Influenza Virus Vaccine 11/14/2014 0.5 mL 8/19/2014 Intramuscular    Influenza, Triv, 3 years and older, IM 10/7/2016 0.5 mL 8/7/2015 Intramuscular    Pneumococcal Polysaccharide (Ozqopuzmh30) 10/6/2013 0.5 mL 10/6/2009 Intramuscular    Tdap (Boostrix, Adacel) 3/8/2013 0.5 mL 1/24/2012 Intramuscular      Last Vitals          Most Recent Value    Temp  98.9 °F (37.2 °C)    Pulse  87    Resp  17    BP  134/63         After Visit Summary    This summary was created for you. Thank you for entrusting your care to us. The following information includes details about your hospital/visit stay along with steps you should take to help with your recovery once you leave the hospital.  In this packet, you will find information about the topics listed below:    · Instructions about your medications including a list of your home medications  · A summary of your hospital visit  · Follow-up appointments once you have left the hospital  · Your care plan at home      You may receive a survey regarding the care you received during your stay. Your input is valuable to us.  We encourage you to complete and return your survey in the envelope provided. We hope you will choose us in the future for your healthcare needs. Patient Information     Patient Name NITHYA Cheney 1965      Care Provided at:     Name Address Phone       [address] [facility] [phone]            Your Visit    Here you will find information about your visit, including the reason for your visit. Please take this sheet with you when you visit your doctor or other health care provider in the future. It will help determine the best possible medical care for you at that time. If you have any questions once you leave the hospital, please call the department phone number listed below. Why you were here     Your primary diagnosis was:  Not on File    Your diagnoses also included:  Cocaine Abuse, Anemia In Ckd (Chronic Kidney Disease), End Stage Renal Disease On Dialysis (Hcc), Tobacco Abuse, Diet-Controlled Diabetes Mellitus (Hcc)      Visit Information     Date & Time Provider Department Dept. Phone    10/4/2017 Ant Lynn MD [address] [facility] Renal Telemetry 6K [phone]       Follow-up Appointments    Below is a list of your follow-up and future appointments. This may not be a complete list as you may have made appointments directly with providers that we are not aware of or your providers may have made some for you. Please call your providers to confirm appointments. It is important to keep your appointments. Please bring your current insurance card, photo ID, co-pay, and all medication bottles to your appointment. If self-pay, payment is expected at the time of service.         Follow-up Information     Follow up with Jethro Horn MD.    Specialty:  Nephrology    Contact information:    [facility], [address]  [address] [address] [phone]        Preventive Care        Date Due    Eye Exam By An Eye Doctor 1975 you to get a refill. Don't wait until you have only a few pills left. · Talk with your doctor or pharmacist about your medicine. Find out what to do if you miss a dose. When should you call for help? High Blood Pressure: Care Instructions  Your Care Instructions  If your blood pressure is usually above 140/90, you have high blood pressure, or hypertension. That means the top number is 140 or higher or the bottom number is 90 or higher, or both. Despite what a lot of people think, high blood pressure usually doesn't cause headaches or make you feel dizzy or lightheaded. It usually has no symptoms. But it does increase your risk for heart attack, stroke, and kidney or eye damage. The higher your blood pressure, the more your risk increases. Your doctor will give you a goal for your blood pressure. Your goal will be based on your health and your age. An example of a goal is to keep your blood pressure below 140/90. Lifestyle changes, such as eating healthy and being active, are always important to help lower blood pressure. You might also take medicine to reach your blood pressure goal.  Follow-up care is a key part of your treatment and safety. Be sure to make and go to all appointments, and call your doctor if you are having problems. It's also a good idea to know your test results and keep a list of the medicines you take. How can you care for yourself at home? Medical treatment  · If you stop taking your medicine, your blood pressure will go back up. You may take one or more types of medicine to lower your blood pressure. Be safe with medicines. Take your medicine exactly as prescribed. Call your doctor if you think you are having a problem with your medicine. · Talk to your doctor before you start taking aspirin every day. Aspirin can help certain people lower their risk of a heart attack or stroke. But taking aspirin isn't right for everyone, because it can cause serious bleeding. · See your doctor regularly. You may need to see the doctor more often at first or until your blood pressure comes down. · If you are taking blood pressure medicine, talk to your doctor before you take decongestants or anti-inflammatory medicine, such as ibuprofen. Some of these medicines can raise blood pressure. · Learn how to check your blood pressure at home. Lifestyle changes  · Stay at a healthy weight. This is especially important if you put on weight around the waist. Losing even 10 pounds can help you lower your blood pressure. · If your doctor recommends it, get more exercise. Walking is a good choice. Bit by bit, increase the amount you walk every day. Try for at least 30 minutes on most days of the week. You also may want to swim, bike, or do other activities. · Avoid or limit alcohol. Talk to your doctor about whether you can drink any alcohol. · Try to limit how much sodium you eat to less than 2,300 milligrams (mg) a day. Your doctor may ask you to try to eat less than 1,500 mg a day. · Eat plenty of fruits (such as bananas and oranges), vegetables, legumes, whole grains, and low-fat dairy products. · Lower the amount of saturated fat in your diet. Saturated fat is found in animal products such as milk, cheese, and meat. Limiting these foods may help you lose weight and also lower your risk for heart disease. · Do not smoke. Smoking increases your risk for heart attack and stroke. If you need help quitting, talk to your doctor about stop-smoking programs and medicines. These can increase your chances of quitting for good. When should you call for help? Call 911 anytime you think you may need emergency care. This may mean having symptoms that suggest that your blood pressure is causing a serious heart or blood vessel problem. Your blood pressure may be over 180/110. For example, call 911 if:  · You have symptoms of a heart attack.  These may include: ¨ Chest pain or pressure, or a strange feeling in the chest.  ¨ Sweating. ¨ Shortness of breath. ¨ Nausea or vomiting. ¨ Pain, pressure, or a strange feeling in the back, neck, jaw, or upper belly or in one or both shoulders or arms. ¨ Lightheadedness or sudden weakness. ¨ A fast or irregular heartbeat. · You have symptoms of a stroke. These may include:  ¨ Sudden numbness, tingling, weakness, or loss of movement in your face, arm, or leg, especially on only one side of your body. ¨ Sudden vision changes. ¨ Sudden trouble speaking. ¨ Sudden confusion or trouble understanding simple statements. ¨ Sudden problems with walking or balance. ¨ A sudden, severe headache that is different from past headaches. · You have severe back or belly pain. Do not wait until your blood pressure comes down on its own. Get help right away. Call your doctor now or seek immediate care if:  · Your blood pressure is much higher than normal (such as 180/110 or higher), but you don't have symptoms. · You think high blood pressure is causing symptoms, such as:  ¨ Severe headache. ¨ Blurry vision. Watch closely for changes in your health, and be sure to contact your doctor if:  · Your blood pressure measures 140/90 or higher at least 2 times. That means the top number is 140 or higher or the bottom number is 90 or higher, or both. · You think you may be having side effects from your blood pressure medicine. · Your blood pressure is usually normal, but it goes above normal at least 2 times. Where can you learn more? clonidine (oral)  Pronunciation:  JAVIER randolph  Brand:  Angela Fosetr  What is the most important information I should know about clonidine? Follow all directions on your medicine label and package. Tell each of your healthcare providers about all your medical conditions, allergies, and all medicines you use. What is clonidine?   Clonidine lowers blood pressure by decreasing the levels of certain operating machinery until you know how this medicine will affect you. Dizziness or severe drowsiness can cause falls or other accidents. What are the possible side effects of clonidine? Get emergency medical help if you have signs of an allergic reaction: hives; difficult breathing; swelling of your face, lips, tongue, or throat. Call your doctor at once if you have:  · severe chest pain, shortness of breath, irregular heartbeats;  · a very slow heart rate;  · severe headache, pounding in your neck or ears, blurred vision;  · nosebleeds;  · anxiety, confusion; or  · a light-headed feeling, like you might pass out. Serious side effects may be more likely in older adults. Common side effects may include:  · drowsiness, dizziness;  · feeling tired or irritable;  · dry mouth, loss of appetite;  · constipation;  · dry eyes, contact lens discomfort; or  · sleep problems (insomnia), nightmares. This is not a complete list of side effects and others may occur. Call your doctor for medical advice about side effects. You may report side effects to FDA at 7-352-FDA-3850. What other drugs will affect clonidine? Taking this medicine with other drugs that make you sleepy can worsen this effect. Ask your doctor before taking clonidine with a sleeping pill, narcotic pain medicine, muscle relaxer, or medicine for anxiety, depression, or seizures. Tell your doctor about all your current medicines and any you start or stop using, especially:  · other heart or blood pressure medications;  · an antidepressant; or  · any other medicine that contains clonidine. This list is not complete. Other drugs may interact with clonidine, including prescription and over-the-counter medicines, vitamins, and herbal products. Not all possible interactions are listed in this medication guide. Important information for a smoker       SMOKING: QUIT SMOKING.   THIS IS THE MOST IMPORTANT ACTION YOU CAN TAKE TO

## 2017-10-04 NOTE — IP AVS SNAPSHOT
Patient Information     Patient Name NITHYA Rodríguez 1965      Important Information for Heart Failure       If your condition worsens or if you have any concerns, call your doctor or seek emergency medical services (dial 9-1-1) as needed. If you have any of the following symptoms/conditions, call your doctor. Call your primary care physician to obtain results of outstanding lab tests, cultures, x-rays, or other tests. If you have a current diagnosis or history of any of the following, please review the information carefully. HEART FAILURE PATIENTS:   DISCHARGE WEIGHT: Weight: 205 lb 14.6 oz (93.4 kg)  Record daily weights. Notify your doctor if you have a weight gain 2 pounds in a day, or 3-5 pounds in 1 week. Notify your doctor for increased shortness of breath, or swelling in legs or feet. Follow a low sodium diet. Resume normal activity unless otherwise instructed by your doctor.

## 2017-10-04 NOTE — CONSULTS
Nephrology Consult Note  Patient's Name: Buddy Hill  6:02 PM  10/4/2017    Nephrologist: Aureliano Pop    Reason for Consult:  End-stage kidney disease. Hemodialysis management. Requesting Physician:  Nicholas Witt MD    Chief Complaint:  Shortness of breath  Assessment  1-end-stage kidney disease from hypertensive nephrosclerosis and diabetic nephropathy on hemodialysis  2-diabetes mellitus type 2 with renal manifestations  3-accelerated hypertension  4. Anemia of chronic disease  5. Probable volume overload    Plan  1-labs reviewed  2-medications reviewed  3-chest x-ray report reviewed  4. Hemodialysis in progress  5. Tolerating treatment well with stable vital signs here in the dialysis unit  6. Labetalol 20 mg IV 1 time injection  7. We'll start labetalol infusion 2 mg/min to titrate up to 5 mg per minute maintaining systolic blood pressure between 150-160 mmhg    History Obtained From:  Staff and medical record as patient is very sleepy  History of Present Ilness:    Buddy Hill is a 50 y.o. male with history of end-stage kidney disease from diabetic nephropathy and hypertensive nephrosclerosis who receives her hemodialysis treatment at the [facility] medical care dialysis unit on Tuesdays, Thursdays and Saturdays respectively. Patient was last dialyzed on 30 September 2017. He did not keep his dialysis appointment for yesterday. In any case he presented to the emergency room today with complaint of shortness of breath and high blood pressure. His systolic blood pressure was above 220 mmhg with diastolic of over 592 mmhg. He was admitted for evaluation and management. I will asked to see him for end-stage kidney disease and  management for hemodialysis. I saw him in that dialysis unit. He was very sleepy and therefore could not hold meaningful conversation with me.     Past Medical History:   Diagnosis Date    AAA (abdominal aortic aneurysm) (HCC)     Acute on chronic diastolic CHF (congestive heart failure), NYHA class 2 ([facility]     NURIS (acute kidney injury) ([facility] 9/24/2015    Anemia associated with chronic renal failure     Arthritis     stated in hands    Cocaine abuse 5/10/2014    Depression     Diabetes mellitus ([facility]     pt states he no longer has diabetes he has lost alot of davion.  Diastolic heart failure secondary to coronary artery disease (HCC)     FSGS (focal segmental glomerulosclerosis) 5/23/2013    Hemodialysis patient ([facility] 10/17/2016    [facility]    Hemorrhoids 1/16/2012    History of blood transfusion     Hyperlipidemia     Hyperphosphatemia 5/21/2016    Hypertension     Left renal artery stenosis ([facility] 5/22/2014    Monoclonal (M) protein disease, multiple 'M' protein     Nicotine dependence 6/16/2014    Noncompliance     Pneumonia     Psychiatric problem     PTSD (post-traumatic stress disorder)     Pt vet from [place]    Secondary hyperparathyroidism (of renal origin)     Sleep apnea        Past Surgical History:   Procedure Laterality Date    DIALYSIS FISTULA CREATION Left 07/08/2016    EKG 12-LEAD  9/24/2015         LEG TENDON SURGERY      VASCULAR SURGERY Left 07/08/2016    AV Fistula    VASCULAR SURGERY Right 1990    Surgery on Achilles tendon       Family History   Problem Relation Age of Onset    Cancer Mother     Other Brother      aneurysm         reports that he has been smoking Cigarettes. He started smoking about 32 years ago. He has a 5.00 pack-year smoking history. He has never used smokeless tobacco. He reports that he drinks about 1.2 oz of alcohol per week  He reports that he uses illicit drugs, including Cocaine. Allergies:  Review of patient's allergies indicates no known allergies.     Current Medications:      cloNIDine (CATAPRES) tablet 0.3 mg TID   doxazosin (CARDURA) tablet 4 mg Nightly   famotidine (PEPCID) tablet 20 mg Daily   fluticasone (FLONASE) 50 MCG/ACT nasal spray 1 spray BID   isosorbide mononitrate (IMDUR) extended release tablet 120 mg Daily   minoxidil (LONITEN) tablet 10 mg BID   sertraline (ZOLOFT) tablet 50 mg Daily   sevelamer (RENVELA) tablet 1,600 mg TID WC   verapamil (CALAN SR) extended release tablet 240 mg Daily   sodium chloride flush 0.9 % injection 10 mL 2 times per day   sodium chloride flush 0.9 % injection 10 mL PRN   acetaminophen (TYLENOL) tablet 650 mg Q4H PRN   ondansetron (ZOFRAN) injection 4 mg Q6H PRN   heparin (porcine) injection 5,000 Units 3 times per day   nicotine (NICODERM CQ) 7 MG/24HR 1 patch Daily   labetalol (NORMODYNE;TRANDATE) injection 20 mg Once   labetalol (TRANDATE) 500 mg in 100 mL infusion Continuous     0.45 % sodium chloride infusion Continuous   HYDROmorphone (DILAUDID) injection 1 mg Once   ioversol (OPTIRAY) 51 % injection 100 mL ONCE PRN   lidocaine (LMX) 4 % cream Once   midazolam (VERSED) injection 1 mg Once       Review of Systems:   Pertinent positives stated above in HPI. All other systems were reviewed and were negative. ROS:Constitutional: negative  Eyes: negative  Ears, nose, mouth, throat, and face: negative  Respiratory: positive for dyspnea on exertion and shortness of breath  Cardiovascular: negative  Gastrointestinal: negative  Genitourinary:negative  Integument/breast: negative  Hematologic/lymphatic: negative  Musculoskeletal:positive for arthralgias and stiff joints  Neurological: negative  Behavioral/Psych: negative  Endocrine: negative  Allergic/Immunologic: negative    Physical exam:   Constitutional:  Well-developed middle-aged gentleman very sleepy but in no distress. Vitals:   Vitals:    10/04/17 1730   BP: (!) 162/97   Pulse: 96   Resp:    Temp:    SpO2:        Skin: no rash, turgor wnl  Heent:  Pupils are reactive to light and accommodation. Throat is clear. Neck: no bruits or jvd noted  Cardiovascular:  S1, S2 without m/r/g  Respiratory: Decreased breath sound in the anterior. Abdomen:  Soft.   Good

## 2017-10-04 NOTE — ED NOTES
EKG complete, medication given for continued high blood pressure. Pt currently 193/113. Dr Luis Dinero at bedside to update pt and family on plan of care. Will continue to monitor.       Siena Graves RN  10/04/17 [id number]

## 2017-10-04 NOTE — H&P
pt states he no longer has diabetes he has lost alot of davion.  Diastolic heart failure secondary to coronary artery disease (HCC)     FSGS (focal segmental glomerulosclerosis) 5/23/2013    Hemodialysis patient ([address] 10/17/2016    on hemodialysis with [facility] 1/16/2012    History of blood transfusion     Hyperlipidemia     Hyperphosphatemia 5/21/2016    Hypertension     Left renal artery stenosis ([facility] [address] 5/22/2014    Monoclonal (M) protein disease, multiple 'M' protein     Nicotine dependence 6/16/2014    Noncompliance     Pneumonia     Psychiatric problem     PTSD (post-traumatic stress disorder)     Pt vet from [place]    Secondary hyperparathyroidism (of renal origin)     Sleep apnea        Past Surgical History:          Procedure Laterality Date    DIALYSIS FISTULA CREATION Left 07/08/2016    EKG 12-LEAD  9/24/2015         LEG TENDON SURGERY      VASCULAR SURGERY Left 07/08/2016    AV Fistula    VASCULAR SURGERY Right 1990    Surgery on Achilles tendon       Medications Prior to Admission:      Prior to Admission medications    Medication Sig Start Date End Date Taking?  Authorizing Provider   famotidine (PEPCID) 20 MG tablet Take 20 mg by mouth daily   Yes Historical Provider, MD   cloNIDine (CATAPRES) 0.3 MG tablet Take 1 tablet by mouth three times daily 8/6/17  Yes Eli Mcneal MD   Cholecalciferol (VITAMIN D) 2000 units CAPS capsule Take by mouth 2 times daily   Yes Historical Provider, MD   minoxidil (LONITEN) 10 MG tablet One tab twice daily 5/6/17  Yes Alfa Billingsley MD   verapamil (CALAN SR) 240 MG extended release tablet Take 1 tablet by mouth daily 12/26/16  Yes Alfa Billingsley MD   doxazosin (CARDURA) 4 MG tablet Take 1 tablet by mouth nightly 12/26/16  Yes Alfa Billingsley MD   isosorbide mononitrate (IMDUR) 120 MG extended release tablet Take 1 tablet by mouth daily 12/26/16  Yes Alfa Billingsley MD   sevelamer (RENVELA) 800 MG tablet Take 2 tablets by mouth 3 times daily (with meals) 12/26/16  Yes Jannette Bautista MD   sertraline (ZOLOFT) 50 MG tablet Take 1 tablet by mouth daily 11/8/16  Yes Alejandra Cogan, CNP   Multiple Vitamins-Minerals (THERAPEUTIC MULTIVITAMIN-MINERALS) tablet Take 1 tablet by mouth daily   Yes Historical Provider, MD   fluticasone (FLONASE) 50 MCG/ACT nasal spray 1 spray by Nasal route 2 times daily    Yes Historical Provider, MD       Allergies:  Review of patient's allergies indicates no known allergies. Social History:      The patient currently lives at home with his wife. TOBACCO:   reports that he has been smoking Cigarettes. He started smoking about 32 years ago. He has a 5.00 pack-year smoking history. He has never used smokeless tobacco.  ETOH:   reports that he drinks about 1.2 oz of alcohol per week       Family History:      Positive as follows:        Problem Relation Age of Onset    Cancer Mother     Other Brother      aneurysm        Diet:  DIET RENAL;    REVIEW OF SYSTEMS:   Pertinent positives as noted in the HPI. All other systems reviewed and negative. PHYSICAL EXAM:    BP (!) 180/99  Pulse 95  Temp 98.3 °F (36.8 °C)  Resp 16  SpO2 99%    General appearance:  No apparent distress, sitting in ED bed, chronically ill appearing, and cooperative. HEENT:  Normal cephalic, atraumatic without obvious deformity. Pupils equal, round, and reactive to light. Extra ocular muscles intact. Conjunctivae/corneas clear. Oral mucosa is moist.   Neck: Supple, with full range of motion. No jugular venous distention. Trachea midline. Respiratory:  Normal respiratory effort. Currently on 3L per NC in ED with O2 sat of 100% per pulse ox. Rales in the bases bilaterally. Cardiovascular:  Regular rate and rhythm with normal S1/S2 without murmurs, rubs or gallops. Abdomen: Soft, non-tender, non-distended with normal bowel sounds. Musculoskeletal:  No clubbing, cyanosis bilaterally.   Full range of motion without deformity. Symmetric upper and lower extremity strength. Extremities: trace to 1+ pitting edema of bilateral LE   Skin: Skin color, texture, turgor normal.  No rashes or lesions. Neurologic:  Neurovascularly intact without any focal sensory/motor deficits. Cranial nerves: II-XII intact, grossly non-focal. Symmetric upper and lower extremity strength. Psychiatric:  Alert and oriented  Capillary Refill: Brisk,< 3 seconds   Peripheral Pulses: +2 palpable, equal bilaterally       Labs:     Recent Labs      10/04/17   1025   WBC  12.6*   HGB  10.6*   HCT  32.0*   PLT  283     Recent Labs      10/04/17   1025   NA  143   K  5.4*   CL  99   CO2  27   BUN  69*   CREATININE  15.0*   CALCIUM  10.7*     Urinalysis:      Lab Results   Component Value Date    NITRU NEGATIVE 08/04/2017    WBCUA 2-4 08/04/2017    BACTERIA NONE 08/04/2017    RBCUA 3-5 08/04/2017    BLOODU SMALL 08/04/2017    SPECGRAV 1.011 08/04/2017    GLUCOSEU NEGATIVE 05/04/2017       Radiology:       CT CHEST WO CONTRAST   Final Result   1. Evaluation of the known thoracic aortic aneurysm/dissection is very limited without IV contrast, its appearance grossly stable from the previous contrast-enhanced study of 9/28/2017.   2. Patchy right basilar airspace disease, infiltrate versus atelectasis, improved from the previous study. **This report has been created using voice recognition software. It may contain minor errors which are inherent in voice recognition technology. **      Final report electronically signed by Dr. Neil Lujan on 10/4/2017 12:39 PM      XR Chest Portable   Final Result   1. Patchy right basilar airspace disease, mildly improved from the previous study of 9/28/2017.   2. Stable cardiomegaly and known aneurysmal dilatation of the thoracic aorta. **This report has been created using voice recognition software.   It may contain minor errors which are inherent in voice recognition

## 2017-10-05 VITALS
WEIGHT: 205.91 LBS | RESPIRATION RATE: 17 BRPM | SYSTOLIC BLOOD PRESSURE: 134 MMHG | OXYGEN SATURATION: 95 % | HEART RATE: 87 BPM | BODY MASS INDEX: 25.6 KG/M2 | HEIGHT: 75 IN | TEMPERATURE: 98.9 F | DIASTOLIC BLOOD PRESSURE: 63 MMHG

## 2017-10-05 LAB
ANION GAP SERPL CALCULATED.3IONS-SCNC: 14 MEQ/L (ref 8–16)
ANISOCYTOSIS: ABNORMAL
BASOPHILS # BLD: 0.7 %
BASOPHILS ABSOLUTE: 0 THOU/MM3 (ref 0–0.1)
BUN BLDV-MCNC: 39 MG/DL (ref 7–22)
CALCIUM SERPL-MCNC: 8.7 MG/DL (ref 8.5–10.5)
CHLORIDE BLD-SCNC: 97 MEQ/L (ref 98–111)
CO2: 26 MEQ/L (ref 23–33)
CREAT SERPL-MCNC: 10.2 MG/DL (ref 0.4–1.2)
EKG ATRIAL RATE: 104 BPM
EKG P AXIS: 57 DEGREES
EKG P-R INTERVAL: 192 MS
EKG Q-T INTERVAL: 364 MS
EKG QRS DURATION: 98 MS
EKG QTC CALCULATION (BAZETT): 478 MS
EKG R AXIS: -54 DEGREES
EKG T AXIS: 90 DEGREES
EKG VENTRICULAR RATE: 104 BPM
EOSINOPHIL # BLD: 3.7 %
EOSINOPHILS ABSOLUTE: 0.2 THOU/MM3 (ref 0–0.4)
GFR SERPL CREATININE-BSD FRML MDRD: 7 ML/MIN/1.73M2
GLUCOSE BLD-MCNC: 77 MG/DL (ref 70–108)
HCT VFR BLD CALC: 28.5 % (ref 42–52)
HEMOGLOBIN: 9.8 GM/DL (ref 14–18)
LYMPHOCYTES # BLD: 9 %
LYMPHOCYTES ABSOLUTE: 0.6 THOU/MM3 (ref 1–4.8)
MAGNESIUM: 2.6 MG/DL (ref 1.6–2.4)
MCH RBC QN AUTO: 32.3 PG (ref 27–31)
MCHC RBC AUTO-ENTMCNC: 34.4 GM/DL (ref 33–37)
MCV RBC AUTO: 94.1 FL (ref 80–94)
MONOCYTES # BLD: 10.1 %
MONOCYTES ABSOLUTE: 0.7 THOU/MM3 (ref 0.4–1.3)
NUCLEATED RED BLOOD CELLS: 0 /100 WBC
PDW BLD-RTO: 15.4 % (ref 11.5–14.5)
PLATELET # BLD: 206 THOU/MM3 (ref 130–400)
PMV BLD AUTO: 7 MCM (ref 7.4–10.4)
POTASSIUM SERPL-SCNC: 5.5 MEQ/L (ref 3.5–5.2)
RBC # BLD: 3.03 MILL/MM3 (ref 4.7–6.1)
RBC # BLD: NORMAL 10*6/UL
SEG NEUTROPHILS: 76.5 %
SEGMENTED NEUTROPHILS ABSOLUTE COUNT: 5 THOU/MM3 (ref 1.8–7.7)
SODIUM BLD-SCNC: 137 MEQ/L (ref 135–145)
WBC # BLD: 6.5 THOU/MM3 (ref 4.8–10.8)

## 2017-10-05 PROCEDURE — 85025 COMPLETE CBC W/AUTO DIFF WBC: CPT

## 2017-10-05 PROCEDURE — 6370000000 HC RX 637 (ALT 250 FOR IP): Performed by: PHYSICIAN ASSISTANT

## 2017-10-05 PROCEDURE — 6370000000 HC RX 637 (ALT 250 FOR IP): Performed by: INTERNAL MEDICINE

## 2017-10-05 PROCEDURE — 36415 COLL VENOUS BLD VENIPUNCTURE: CPT

## 2017-10-05 PROCEDURE — 80048 BASIC METABOLIC PNL TOTAL CA: CPT

## 2017-10-05 PROCEDURE — 6360000002 HC RX W HCPCS: Performed by: PHYSICIAN ASSISTANT

## 2017-10-05 PROCEDURE — 90935 HEMODIALYSIS ONE EVALUATION: CPT

## 2017-10-05 PROCEDURE — 94640 AIRWAY INHALATION TREATMENT: CPT

## 2017-10-05 PROCEDURE — 99239 HOSP IP/OBS DSCHRG MGMT >30: CPT | Performed by: INTERNAL MEDICINE

## 2017-10-05 PROCEDURE — 90935 HEMODIALYSIS ONE EVALUATION: CPT | Performed by: NURSE PRACTITIONER

## 2017-10-05 PROCEDURE — 83735 ASSAY OF MAGNESIUM: CPT

## 2017-10-05 RX ADMIN — IPRATROPIUM BROMIDE AND ALBUTEROL SULFATE 1 AMPULE: .5; 3 SOLUTION RESPIRATORY (INHALATION) at 13:03

## 2017-10-05 RX ADMIN — ISOSORBIDE MONONITRATE 120 MG: 60 TABLET ORAL at 07:51

## 2017-10-05 RX ADMIN — SEVELAMER CARBONATE 1600 MG: 800 TABLET, FILM COATED ORAL at 13:48

## 2017-10-05 RX ADMIN — FAMOTIDINE 20 MG: 20 TABLET, FILM COATED ORAL at 13:47

## 2017-10-05 RX ADMIN — CLONIDINE HYDROCHLORIDE 0.3 MG: 0.2 TABLET ORAL at 13:48

## 2017-10-05 RX ADMIN — MINOXIDIL 10 MG: 2.5 TABLET ORAL at 07:51

## 2017-10-05 RX ADMIN — SEVELAMER CARBONATE 1600 MG: 800 TABLET, FILM COATED ORAL at 07:51

## 2017-10-05 RX ADMIN — DOXAZOSIN 4 MG: 4 TABLET ORAL at 00:10

## 2017-10-05 RX ADMIN — SERTRALINE 50 MG: 50 TABLET, FILM COATED ORAL at 13:47

## 2017-10-05 RX ADMIN — CLONIDINE HYDROCHLORIDE 0.3 MG: 0.2 TABLET ORAL at 07:51

## 2017-10-05 RX ADMIN — IPRATROPIUM BROMIDE AND ALBUTEROL SULFATE 1 AMPULE: .5; 3 SOLUTION RESPIRATORY (INHALATION) at 09:24

## 2017-10-05 RX ADMIN — VERAPAMIL HYDROCHLORIDE 240 MG: 240 TABLET, FILM COATED, EXTENDED RELEASE ORAL at 07:51

## 2017-10-05 RX ADMIN — HEPARIN SODIUM 5000 UNITS: 5000 INJECTION, SOLUTION INTRAVENOUS; SUBCUTANEOUS at 00:10

## 2017-10-05 ASSESSMENT — PAIN SCALES - GENERAL
PAINLEVEL_OUTOF10: 0
PAINLEVEL_OUTOF10: 0

## 2017-10-05 NOTE — PLAN OF CARE
Problem: Falls - Risk of  Goal: Absence of falls  Outcome: Ongoing  Patient free from falls. Bed in low, locked position with 2/4 rails up and bed alarm on. Fall band intact. Call light, bedside table and personal belongings within reach. Problem: Tissue Perfusion - Renal, Altered:  Goal: Electrolytes within specified parameters  Electrolytes within specified parameters   Outcome: Ongoing  Hemodialysis patient. Labs monitored. Problem: Pain:  Goal: Pain level will decrease  Pain level will decrease   Outcome: Ongoing  Goal: Control of acute pain  Control of acute pain   Outcome: Ongoing  Goal: Control of chronic pain  Control of chronic pain   Outcome: Ongoing  Patient complains of pain in ribcage. Tylenol administered per order. Care plan reviewed with patient. Patient verbalize understanding of the plan of care and contribute to goal setting.

## 2017-10-05 NOTE — PLAN OF CARE
Problem: Discharge Planning  Intervention: Discharge to appropriate level of care  Dialysis schedule and Mircera dosing      Comments:   Upon discharge, anticipate patient resuming out-[facility] hemod[facility] on Tues-Thurs-Sat at 0835  Mircera 100 mcg every 2 weeks with last dose given on 9-26-17  Dialysis staff aware of hospital admission. Will continue to follow.

## 2017-10-05 NOTE — PROGRESS NOTES
CLINICAL PHARMACY: DISCHARGE MED RECONCILIATION/REVIEW    [facility]) Select Patient?: No  Total # of Interventions Recommended: 0   - Increased Dose #: 0  Total # Interventions Accepted: 0  Intervention Severity:   - Level 1 Intervention Present?: No   - Level 2 #: 0   - Level 3 #: 0   Time Spent (min): 0    Minerva Sweeney PharmD 10/5/2017 9:49 AM
Discharge teaching and instructions for diagnosis/procedure of hypertension and medication complicance completed with patient using teachback method. AVS reviewed. Printed prescriptions given to patient. Patient voiced understanding regarding prescriptions, follow up appointments, and care of self at home. Discharged ambulatory and in a wheelchair to  home with support per [facility]. All belongings collected and sent home with patient. Instructed to follow up with Dr. Lyssa Cook at hemodialysis tomorrow.
Patient off the floor for dialysis.
Post Treatment   HCT reading / last 10 Min of Tx (%) n/a   UFR to 300 (Y/N) n/a   Decrease in HCT of 0.5% (Y/N) n/a   Refill Present (Y/N) n/a   Ending Crit Monitor Profile n/a     Access Assessment  WNL       Ultrafiltration   UF Goal 2400   Prime (-) 400   NS Flush (-) 200   Other (-/+)    Total UF Removed 1800       Medications/Blood   Medications Given (Y/N) y   Blood Products (Y/N) y     Post Treatment Report Narrative:T 98.9 /61 P 84 R 17 WT 93.4KG, patient came off treatment 45mins early per his request Dr Kendrick Lou notified.
normocephalic, Extraocular movement intact, Mucus membranes moist. Neck is supple. Voice is clear. CARDIOVASCULAR:  S1, S2  regular rate and rhythm. RESPIRATORY: Clear to ausculation bilaterally. Equal breath sounds. No wheezes. No shortness of breath noted at rest.  ABDOMEN: soft, non tender  NEUROLOGICAL: Patient is alert and oriented to person, place, and time. Recent and remote memory intact. Pt is attentive. Thought is coherant. SKIN: no rash, No significant bruises  MUSCULOSKELETAL: Movement is coordinated. Moves all extremities   EXTREMITIES: Distal lower extremity temp is warm, trace lower extremity edema. Upper extremity AV Fistula   PSYCHIATRIC: mood and affect appropriate. Medications:   Scheduled Meds:   cloNIDine  0.3 mg Oral TID    doxazosin  4 mg Oral Nightly    famotidine  20 mg Oral Daily    fluticasone  1 spray Nasal BID    isosorbide mononitrate  120 mg Oral Daily    minoxidil  10 mg Oral BID    sertraline  50 mg Oral Daily    sevelamer  1,600 mg Oral TID WC    verapamil  240 mg Oral Daily    sodium chloride flush  10 mL Intravenous 2 times per day    heparin (porcine)  5,000 Units Subcutaneous 3 times per day    nicotine  1 patch Transdermal Daily    ipratropium-albuterol  1 ampule Inhalation Q4H WA     Continuous Infusions:     Labs:     Recent Labs      10/04/17   1025  10/05/17   0402   NA  143  137   K  5.4*  5.5*   CL  99  97*   CO2  27  26   BUN  69*  39*   CREATININE  15.0*  10.2*   LABGLOM  4*  7*   GLUCOSE  105  77   MG   --   2.6*   CALCIUM  10.7*  8.7     Recent Labs      10/04/17   1025  10/05/17   0402   WBC  12.6*  6.5   RBC  3.38*  3.03*   HGB  10.6*  9.8*   HCT  32.0*  28.5*   PLT  283  206        ASSESSMENT:  1. End Stage Renal Disease 2nd to diabetic, hypertensive nephrosclerosis and FSGS. Hemodialysis T,T,S. Hemodialysis today per out pt schedule, 2 K bath  2. Hyperkalemia 2nd to ESRD 2 K bath  3. Accelerated HTN, improved control  4.  Diabetes Mellitus Type

## 2017-10-05 NOTE — FLOWSHEET NOTE
10/04/17 2340   Provider Notification   Reason for Communication Review case   Provider Name Dr. Tristan Rothman   Provider Notification Physician   Method of Communication Secure Message   Response Waiting for response   Notification Time [phone]   Order parameters for BP meds.

## 2017-10-06 NOTE — DISCHARGE SUMMARY
discharge      Patient:  Lila Dodd  YOB: 1965    MRN: [id number]     Acct: [de-identified]    PCP: Ciaran Warner MD    Date of Admission: 10/4/2017    Date of Service: Pt seen/examined on 10/4/17 and Admitted to Inpatient with expected LOS greater than two midnights due to medical therapy. Chief Complaint:  Shortness of breath, cough       History Of Present Illness:    50 y.o. male who presented to [address] [place] [address] with chief complaints of cough, shortness of breath for the last week. He was recently admitted to [facility] on 9/29/17, diagnosed with pneumonia and discharged on a Zpak. He finished Ricke Abraham today and came in for continued shortness of breath and cough. He has a hx of ESRD on HD, T, Thr, Sat schedule with last reported HD yesterday. He states his HD went well and did not have any difficulty. He affirms continued chills, minimal productive cough. He reports one episode of right sided chest pain, but resolved after receiving Duoneb in the ED. He denies fever, abdominal pain, nausea, vomiting, bowel or urinary changes. While in the ED the patient was noted to have BP of 203/114. He initially states he took all of his medications, but on further examination he reports he did forget to take his am dose of clonidine. Patient's past medical history is significant for ESRD on HD, followed by Dr. Chuck Kiran. He has uncontrolled HTN. He reports hx of DM but went away with weight loss. Patient has a history of cocaine abuse but denies any recent use. The Hospitalist Service was asked to admit the patient.       Subjective:-    Sen after dialysis  Doing better  Says he is moving to [place]  bp controlled       Medication List      CONTINUE taking these medications          cloNIDine 0.3 MG tablet   Commonly known as:  CATAPRES   Take 1 tablet by mouth three times daily       doxazosin 4 MG tablet   Commonly known as:  CARDURA   Take 1 tablet by mouth nightly       famotidine 20 MG tablet Commonly known as:  PEPCID       fluticasone 50 MCG/ACT nasal spray   Commonly known as:  FLONASE       isosorbide mononitrate 120 MG extended release tablet   Commonly known as:  IMDUR   Take 1 tablet by mouth daily       minoxidil 10 MG tablet   Commonly known as:  LONITEN   One tab twice daily       sertraline 50 MG tablet   Commonly known as:  ZOLOFT   Take 1 tablet by mouth daily       sevelamer 800 MG tablet   Commonly known as:  RENVELA   Take 2 tablets by mouth 3 times daily (with meals)       therapeutic multivitamin-minerals tablet       verapamil 240 MG extended release tablet   Commonly known as:  CALAN SR   Take 1 tablet by mouth daily       vitamin D 2000 units Caps capsule               PHYSICAL EXAM:    /63  Pulse 87  Temp 98.9 °F (37.2 °C)  Resp 17  Ht 6' 3\" (1.905 m)  Wt 205 lb 14.6 oz (93.4 kg)  SpO2 95%  BMI 25.74 kg/m2    General appearance:  No apparent distress, sitting in ED bed, chronically ill appearing, and cooperative. HEENT:  Normal cephalic, atraumatic without obvious deformity. Pupils equal, round, and reactive to light. Extra ocular muscles intact. Conjunctivae/corneas clear. Oral mucosa is moist.   Neck: Supple, with full range of motion. No jugular venous distention. Trachea midline. Respiratory:  Normal respiratory effort. Currently on 3L per NC in ED with O2 sat of 100% per pulse ox. Rales in the bases bilaterally. Cardiovascular:  Regular rate and rhythm with normal S1/S2 without murmurs, rubs or gallops. Abdomen: Soft, non-tender, non-distended with normal bowel sounds. Musculoskeletal:  No clubbing, cyanosis bilaterally. Full range of motion without deformity. Symmetric upper and lower extremity strength. Extremities: trace to 1+ pitting edema of bilateral LE   Skin: Skin color, texture, turgor normal.  No rashes or lesions. Neurologic:  Neurovascularly intact without any focal sensory/motor deficits.  Cranial nerves: II-XII intact, grossly non-focal. Symmetric upper and lower extremity strength. Psychiatric:  Alert and oriented  Capillary Refill: Brisk,< 3 seconds   Peripheral Pulses: +2 palpable, equal bilaterally       Labs:     Recent Labs      10/04/17   1025  10/05/17   0402   WBC  12.6*  6.5   HGB  10.6*  9.8*   HCT  32.0*  28.5*   PLT  283  206     Recent Labs      10/04/17   1025  10/05/17   0402   NA  143  137   K  5.4*  5.5*   CL  99  97*   CO2  27  26   BUN  69*  39*   CREATININE  15.0*  10.2*   CALCIUM  10.7*  8.7     Urinalysis:      Lab Results   Component Value Date    NITRU NEGATIVE 08/04/2017    WBCUA 2-4 08/04/2017    BACTERIA NONE 08/04/2017    RBCUA 3-5 08/04/2017    BLOODU SMALL 08/04/2017    SPECGRAV 1.011 08/04/2017    GLUCOSEU NEGATIVE 05/04/2017       Radiology:       CT CHEST WO CONTRAST   Final Result   1. Evaluation of the known thoracic aortic aneurysm/dissection is very limited without IV contrast, its appearance grossly stable from the previous contrast-enhanced study of 9/28/2017.   2. Patchy right basilar airspace disease, infiltrate versus atelectasis, improved from the previous study. **This report has been created using voice recognition software. It may contain minor errors which are inherent in voice recognition technology. **      Final report electronically signed by Dr. Indgio Alcala on 10/4/2017 12:39 PM      XR Chest Portable   Final Result   1. Patchy right basilar airspace disease, mildly improved from the previous study of 9/28/2017.   2. Stable cardiomegaly and known aneurysmal dilatation of the thoracic aorta. **This report has been created using voice recognition software. It may contain minor errors which are inherent in voice recognition technology. **      Final report electronically signed by Dr. Indigo Alcala on 10/4/2017 10:58 AM        Echocardiogram 5/5/17   Conclusions      Summary   Left ventricle size is normal.   Moderately increased left ventricle wall

## 2018-02-03 ENCOUNTER — HOSPITAL ENCOUNTER (INPATIENT)
Age: 53
LOS: 2 days | Discharge: HOME | DRG: 304 | End: 2018-02-05
Attending: EMERGENCY MEDICINE | Admitting: INTERNAL MEDICINE
Payer: MEDICARE

## 2018-02-03 ENCOUNTER — APPOINTMENT (OUTPATIENT)
Dept: CT IMAGING | Age: 53
DRG: 304 | End: 2018-02-03
Payer: MEDICARE

## 2018-02-03 ENCOUNTER — APPOINTMENT (OUTPATIENT)
Dept: GENERAL RADIOLOGY | Age: 53
DRG: 304 | End: 2018-02-03
Payer: MEDICARE

## 2018-02-03 DIAGNOSIS — R77.8 ELEVATED TROPONIN: ICD-10-CM

## 2018-02-03 DIAGNOSIS — Z91.199 NONCOMPLIANCE: ICD-10-CM

## 2018-02-03 DIAGNOSIS — Z99.2 ESRD ON HEMODIALYSIS (HCC): ICD-10-CM

## 2018-02-03 DIAGNOSIS — F14.10 NONDEPENDENT COCAINE ABUSE (HCC): ICD-10-CM

## 2018-02-03 DIAGNOSIS — N18.6 ESRD ON HEMODIALYSIS (HCC): ICD-10-CM

## 2018-02-03 DIAGNOSIS — I16.1 HYPERTENSIVE EMERGENCY: Primary | ICD-10-CM

## 2018-02-03 LAB
ALBUMIN SERPL-MCNC: 4 G/DL (ref 3.5–5.1)
ALP BLD-CCNC: 103 U/L (ref 38–126)
ALT SERPL-CCNC: 16 U/L (ref 11–66)
AMPHETAMINE+METHAMPHETAMINE URINE SCREEN: NEGATIVE
ANION GAP SERPL CALCULATED.3IONS-SCNC: 16 MEQ/L (ref 8–16)
ANISOCYTOSIS: ABNORMAL
AST SERPL-CCNC: 18 U/L (ref 5–40)
BACTERIA: ABNORMAL /HPF
BARBITURATE QUANTITATIVE URINE: NEGATIVE
BASOPHILS # BLD: 0.5 %
BASOPHILS ABSOLUTE: 0 THOU/MM3 (ref 0–0.1)
BENZODIAZEPINE QUANTITATIVE URINE: NEGATIVE
BILIRUB SERPL-MCNC: 0.4 MG/DL (ref 0.3–1.2)
BILIRUBIN URINE: NEGATIVE
BLOOD, URINE: ABNORMAL
BUN BLDV-MCNC: 53 MG/DL (ref 7–22)
CALCIUM SERPL-MCNC: 8.8 MG/DL (ref 8.5–10.5)
CANNABINOID QUANTITATIVE URINE: NEGATIVE
CASTS 2: ABNORMAL /LPF
CASTS UA: ABNORMAL /LPF
CHARACTER, URINE: CLEAR
CHLORIDE BLD-SCNC: 98 MEQ/L (ref 98–111)
CO2: 28 MEQ/L (ref 23–33)
COCAINE METABOLITE QUANTITATIVE URINE: POSITIVE
COLOR: YELLOW
CREAT SERPL-MCNC: 8.5 MG/DL (ref 0.4–1.2)
CRYSTALS, UA: ABNORMAL
EKG ATRIAL RATE: 90 BPM
EKG P AXIS: 56 DEGREES
EKG P-R INTERVAL: 206 MS
EKG Q-T INTERVAL: 408 MS
EKG QRS DURATION: 96 MS
EKG QTC CALCULATION (BAZETT): 499 MS
EKG R AXIS: -40 DEGREES
EKG T AXIS: 78 DEGREES
EKG VENTRICULAR RATE: 90 BPM
EOSINOPHIL # BLD: 3.4 %
EOSINOPHILS ABSOLUTE: 0.2 THOU/MM3 (ref 0–0.4)
EPITHELIAL CELLS, UA: ABNORMAL /HPF
GFR SERPL CREATININE-BSD FRML MDRD: 8 ML/MIN/1.73M2
GLUCOSE BLD-MCNC: 77 MG/DL (ref 70–108)
GLUCOSE URINE: 100 MG/DL
HCT VFR BLD CALC: 30.8 % (ref 42–52)
HEMOGLOBIN: 10.1 GM/DL (ref 14–18)
KETONES, URINE: NEGATIVE
LEUKOCYTE ESTERASE, URINE: NEGATIVE
LYMPHOCYTES # BLD: 16 %
LYMPHOCYTES ABSOLUTE: 1.1 THOU/MM3 (ref 1–4.8)
MCH RBC QN AUTO: 31.7 PG (ref 27–31)
MCHC RBC AUTO-ENTMCNC: 32.7 GM/DL (ref 33–37)
MCV RBC AUTO: 97 FL (ref 80–94)
MISCELLANEOUS 2: ABNORMAL
MONOCYTES # BLD: 9.1 %
MONOCYTES ABSOLUTE: 0.6 THOU/MM3 (ref 0.4–1.3)
NITRITE, URINE: NEGATIVE
NUCLEATED RED BLOOD CELLS: 0 /100 WBC
OPIATES, URINE: NEGATIVE
OSMOLALITY CALCULATION: 296.3 MOSMOL/KG (ref 275–300)
OXYCODONE: NEGATIVE
PDW BLD-RTO: 15.4 % (ref 11.5–14.5)
PH UA: 8
PHENCYCLIDINE QUANTITATIVE URINE: NEGATIVE
PLATELET # BLD: 157 THOU/MM3 (ref 130–400)
PMV BLD AUTO: 8 FL (ref 7.4–10.4)
POTASSIUM SERPL-SCNC: 4.7 MEQ/L (ref 3.5–5.2)
PRO-BNP: ABNORMAL PG/ML (ref 0–900)
PROTEIN UA: 100
RBC # BLD: 3.17 MILL/MM3 (ref 4.7–6.1)
RBC URINE: ABNORMAL /HPF
RENAL EPITHELIAL, UA: ABNORMAL
SEG NEUTROPHILS: 71 %
SEGMENTED NEUTROPHILS ABSOLUTE COUNT: 4.8 THOU/MM3 (ref 1.8–7.7)
SODIUM BLD-SCNC: 142 MEQ/L (ref 135–145)
SPECIFIC GRAVITY, URINE: 1.01 (ref 1–1.03)
TOTAL PROTEIN: 6.9 G/DL (ref 6.1–8)
TROPONIN T: 0.03 NG/ML
TROPONIN T: 0.04 NG/ML
UROBILINOGEN, URINE: 0.2 EU/DL
WBC # BLD: 6.7 THOU/MM3 (ref 4.8–10.8)
WBC UA: ABNORMAL /HPF
YEAST: ABNORMAL

## 2018-02-03 PROCEDURE — 85025 COMPLETE CBC W/AUTO DIFF WBC: CPT

## 2018-02-03 PROCEDURE — 80053 COMPREHEN METABOLIC PANEL: CPT

## 2018-02-03 PROCEDURE — 6370000000 HC RX 637 (ALT 250 FOR IP): Performed by: EMERGENCY MEDICINE

## 2018-02-03 PROCEDURE — 96365 THER/PROPH/DIAG IV INF INIT: CPT

## 2018-02-03 PROCEDURE — 99222 1ST HOSP IP/OBS MODERATE 55: CPT | Performed by: HOSPITALIST

## 2018-02-03 PROCEDURE — 81001 URINALYSIS AUTO W/SCOPE: CPT

## 2018-02-03 PROCEDURE — 99285 EMERGENCY DEPT VISIT HI MDM: CPT

## 2018-02-03 PROCEDURE — 2580000003 HC RX 258: Performed by: EMERGENCY MEDICINE

## 2018-02-03 PROCEDURE — 96366 THER/PROPH/DIAG IV INF ADDON: CPT

## 2018-02-03 PROCEDURE — 71045 X-RAY EXAM CHEST 1 VIEW: CPT

## 2018-02-03 PROCEDURE — 83880 ASSAY OF NATRIURETIC PEPTIDE: CPT

## 2018-02-03 PROCEDURE — 80307 DRUG TEST PRSMV CHEM ANLYZR: CPT

## 2018-02-03 PROCEDURE — 70450 CT HEAD/BRAIN W/O DYE: CPT

## 2018-02-03 PROCEDURE — 6360000002 HC RX W HCPCS: Performed by: EMERGENCY MEDICINE

## 2018-02-03 PROCEDURE — 96375 TX/PRO/DX INJ NEW DRUG ADDON: CPT

## 2018-02-03 PROCEDURE — 2500000003 HC RX 250 WO HCPCS: Performed by: EMERGENCY MEDICINE

## 2018-02-03 PROCEDURE — 84484 ASSAY OF TROPONIN QUANT: CPT

## 2018-02-03 PROCEDURE — 2140000000 HC CCU INTERMEDIATE R&B

## 2018-02-03 PROCEDURE — 36415 COLL VENOUS BLD VENIPUNCTURE: CPT

## 2018-02-03 PROCEDURE — 2580000003 HC RX 258: Performed by: HOSPITALIST

## 2018-02-03 PROCEDURE — 93005 ELECTROCARDIOGRAM TRACING: CPT

## 2018-02-03 RX ORDER — FAMOTIDINE 20 MG/1
20 TABLET, FILM COATED ORAL DAILY
Status: DISCONTINUED | OUTPATIENT
Start: 2018-02-04 | End: 2018-02-05 | Stop reason: HOSPADM

## 2018-02-03 RX ORDER — ONDANSETRON 2 MG/ML
4 INJECTION INTRAMUSCULAR; INTRAVENOUS EVERY 6 HOURS PRN
Status: DISCONTINUED | OUTPATIENT
Start: 2018-02-03 | End: 2018-02-05 | Stop reason: HOSPADM

## 2018-02-03 RX ORDER — DEXTROSE MONOHYDRATE 25 G/50ML
25 INJECTION, SOLUTION INTRAVENOUS ONCE
Status: COMPLETED | OUTPATIENT
Start: 2018-02-03 | End: 2018-02-03

## 2018-02-03 RX ORDER — FLUTICASONE PROPIONATE 50 MCG
1 SPRAY, SUSPENSION (ML) NASAL 2 TIMES DAILY
Status: DISCONTINUED | OUTPATIENT
Start: 2018-02-03 | End: 2018-02-05 | Stop reason: HOSPADM

## 2018-02-03 RX ORDER — HEPARIN SODIUM 5000 [USP'U]/ML
5000 INJECTION, SOLUTION INTRAVENOUS; SUBCUTANEOUS EVERY 8 HOURS SCHEDULED
Status: DISCONTINUED | OUTPATIENT
Start: 2018-02-03 | End: 2018-02-05 | Stop reason: HOSPADM

## 2018-02-03 RX ORDER — M-VIT,TX,IRON,MINS/CALC/FOLIC 27MG-0.4MG
1 TABLET ORAL DAILY
Status: DISCONTINUED | OUTPATIENT
Start: 2018-02-04 | End: 2018-02-05 | Stop reason: HOSPADM

## 2018-02-03 RX ORDER — ISOSORBIDE MONONITRATE 60 MG/1
120 TABLET, EXTENDED RELEASE ORAL DAILY
Status: DISCONTINUED | OUTPATIENT
Start: 2018-02-04 | End: 2018-02-05 | Stop reason: HOSPADM

## 2018-02-03 RX ORDER — DOXAZOSIN MESYLATE 4 MG/1
4 TABLET ORAL NIGHTLY
Status: DISCONTINUED | OUTPATIENT
Start: 2018-02-03 | End: 2018-02-05

## 2018-02-03 RX ORDER — DIPHENHYDRAMINE HYDROCHLORIDE 50 MG/ML
50 INJECTION INTRAMUSCULAR; INTRAVENOUS ONCE
Status: COMPLETED | OUTPATIENT
Start: 2018-02-03 | End: 2018-02-03

## 2018-02-03 RX ORDER — CALCIUM GLUCONATE 94 MG/ML
2 INJECTION, SOLUTION INTRAVENOUS ONCE
Status: COMPLETED | OUTPATIENT
Start: 2018-02-03 | End: 2018-02-03

## 2018-02-03 RX ORDER — SODIUM CHLORIDE 0.9 % (FLUSH) 0.9 %
10 SYRINGE (ML) INJECTION EVERY 12 HOURS SCHEDULED
Status: DISCONTINUED | OUTPATIENT
Start: 2018-02-03 | End: 2018-02-05 | Stop reason: HOSPADM

## 2018-02-03 RX ORDER — SEVELAMER CARBONATE 800 MG/1
1600 TABLET, FILM COATED ORAL
Status: DISCONTINUED | OUTPATIENT
Start: 2018-02-04 | End: 2018-02-05 | Stop reason: HOSPADM

## 2018-02-03 RX ORDER — VERAPAMIL HYDROCHLORIDE 240 MG/1
240 TABLET, FILM COATED, EXTENDED RELEASE ORAL DAILY
Status: DISCONTINUED | OUTPATIENT
Start: 2018-02-04 | End: 2018-02-05 | Stop reason: HOSPADM

## 2018-02-03 RX ORDER — ACETAMINOPHEN 325 MG/1
650 TABLET ORAL EVERY 4 HOURS PRN
Status: DISCONTINUED | OUTPATIENT
Start: 2018-02-03 | End: 2018-02-05 | Stop reason: HOSPADM

## 2018-02-03 RX ORDER — LORAZEPAM 2 MG/ML
2 INJECTION INTRAMUSCULAR ONCE
Status: COMPLETED | OUTPATIENT
Start: 2018-02-03 | End: 2018-02-03

## 2018-02-03 RX ORDER — SODIUM CHLORIDE 0.9 % (FLUSH) 0.9 %
10 SYRINGE (ML) INJECTION PRN
Status: DISCONTINUED | OUTPATIENT
Start: 2018-02-03 | End: 2018-02-05 | Stop reason: HOSPADM

## 2018-02-03 RX ADMIN — INSULIN HUMAN 10 UNITS: 100 INJECTION, SOLUTION PARENTERAL at 18:20

## 2018-02-03 RX ADMIN — LORAZEPAM 2 MG: 2 INJECTION INTRAMUSCULAR; INTRAVENOUS at 19:54

## 2018-02-03 RX ADMIN — DIPHENHYDRAMINE HYDROCHLORIDE 50 MG: 50 INJECTION, SOLUTION INTRAMUSCULAR; INTRAVENOUS at 19:27

## 2018-02-03 RX ADMIN — NICARDIPINE HYDROCHLORIDE 5 MG/HR: 0.1 INJECTION, SOLUTION INTRAVENOUS at 17:58

## 2018-02-03 RX ADMIN — DEXTROSE MONOHYDRATE 25 G: 25 INJECTION, SOLUTION INTRAVENOUS at 18:20

## 2018-02-03 RX ADMIN — Medication 10 ML: at 23:10

## 2018-02-03 RX ADMIN — CALCIUM GLUCONATE 2 G: 94 INJECTION, SOLUTION INTRAVENOUS at 18:19

## 2018-02-03 ASSESSMENT — PAIN DESCRIPTION - PAIN TYPE
TYPE: ACUTE PAIN

## 2018-02-03 ASSESSMENT — PAIN DESCRIPTION - DESCRIPTORS
DESCRIPTORS: TIGHTNESS;NUMBNESS
DESCRIPTORS: TIGHTNESS
DESCRIPTORS: TIGHTNESS

## 2018-02-03 ASSESSMENT — PAIN DESCRIPTION - FREQUENCY
FREQUENCY: CONTINUOUS

## 2018-02-03 ASSESSMENT — PAIN DESCRIPTION - LOCATION
LOCATION: ARM

## 2018-02-03 ASSESSMENT — PAIN DESCRIPTION - ORIENTATION
ORIENTATION: LEFT

## 2018-02-03 ASSESSMENT — PAIN SCALES - GENERAL
PAINLEVEL_OUTOF10: 7
PAINLEVEL_OUTOF10: 7
PAINLEVEL_OUTOF10: 6

## 2018-02-03 NOTE — ED PROVIDER NOTES
[facility]  eMERGENCY dEPARTMENT eNCOUnter          CHIEF COMPLAINT       Chief Complaint   Patient presents with    Other     missed dialysis    Other     used cocaine today    Hypertension       Nurses Notes reviewed and I agree except as noted in the HPI. HISTORY OF PRESENT ILLNESS    Jack Murphy is a 48 y.o. male who presents to ED for HTN and missed hemodialysis today. Of note he has no symptoms on arrival.     Long history of HTN on Clonidine and he frequently missed his dosage. He has ESRD on HD with schedule Tues-Thurs-Sat. He missed HD today. He uses Cocaine 8 AM today and he is a routine cocaine abuser. He did not take his Clonidine today. No headache, no neck pain. He has no chest pain. He has mild bilateral leg swelling. REVIEW OF SYSTEMS     Review of Systems   Constitutional: Negative for activity change, appetite change, chills, fatigue and fever. HENT: Negative for congestion, ear discharge, hearing loss, nosebleeds and rhinorrhea. Eyes: Negative for discharge and itching. Respiratory: Negative for cough, shortness of breath and wheezing. Cardiovascular: Negative for chest pain. Gastrointestinal: Negative for abdominal distention, abdominal pain, diarrhea, nausea and vomiting. Endocrine: Negative for cold intolerance. Genitourinary: Negative for dysuria and frequency. ESR on HD. Musculoskeletal: Negative for arthralgias, myalgias, neck pain and neck stiffness. Skin: Negative for rash and wound. Neurological: Negative for dizziness and weakness. Psychiatric/Behavioral: Negative for agitation and suicidal ideas. PAST MEDICAL HISTORY    has a past medical history of AAA (abdominal aortic aneurysm) ([address] Acute on chronic diastolic CHF (congestive heart failure), NYHA class 2 ([address] NURIS (acute kidney injury) ([address] Anemia associated with chronic renal failure;  Arthritis; Cocaine abuse; Depression; Diabetes mellitus ([facility] Diastolic heart failure secondary to coronary artery disease (HCC); FSGS (focal segmental glomerulosclerosis); Hemodialysis patient [facility]); Hemorrhoids; History of blood transfusion; Hyperlipidemia; Hyperphosphatemia; Hypertension; Left renal artery stenosis ([facility] [address] Monoclonal (M) protein disease, multiple 'M' protein; Nicotine dependence; Noncompliance; Pneumonia; Psychiatric problem; PTSD (post-traumatic stress disorder); Secondary hyperparathyroidism (of renal origin); and Sleep apnea. SURGICAL HISTORY      has a past surgical history that includes Leg Tendon Surgery; EKG 12 Lead (9/24/2015); Dialysis fistula creation (Left, 07/08/2016); vascular surgery (Left, 07/08/2016); and vascular surgery (Right, 1990).     CURRENT MEDICATIONS       Current Discharge Medication List      CONTINUE these medications which have NOT CHANGED    Details   famotidine (PEPCID) 20 MG tablet Take 20 mg by mouth daily      cloNIDine (CATAPRES) 0.3 MG tablet Take 1 tablet by mouth three times daily  Qty: 60 tablet, Refills: 3      Cholecalciferol (VITAMIN D) 2000 units CAPS capsule Take by mouth 2 times daily      minoxidil (LONITEN) 10 MG tablet One tab twice daily  Qty: 60 tablet, Refills: 5      verapamil (CALAN SR) 240 MG extended release tablet Take 1 tablet by mouth daily  Qty: 30 tablet, Refills: 3      doxazosin (CARDURA) 4 MG tablet Take 1 tablet by mouth nightly  Qty: 30 tablet, Refills: 5      isosorbide mononitrate (IMDUR) 120 MG extended release tablet Take 1 tablet by mouth daily  Qty: 30 tablet, Refills: 5      sevelamer (RENVELA) 800 MG tablet Take 2 tablets by mouth 3 times daily (with meals)  Qty: 180 tablet, Refills: 5    Associated Diagnoses: Accelerated hypertension      sertraline (ZOLOFT) 50 MG tablet Take 1 tablet by mouth daily  Qty: 30 tablet, Refills: 5    Associated Diagnoses: Accelerated hypertension; Recurrent depression (HCC)      Multiple Vitamins-Minerals (THERAPEUTIC MULTIVITAMIN-MINERALS) tablet Take 1 tablet by mouth daily      fluticasone (FLONASE) 50 MCG/ACT nasal spray 1 spray by Nasal route 2 times daily              ALLERGIES     has No Known Allergies. FAMILY HISTORY     indicated that his mother is . He indicated that his father is . He indicated that his brother is . family history includes Cancer in his mother; Other in his brother. SOCIAL HISTORY      reports that he has been smoking Cigarettes. He started smoking about 32 years ago. He has a 5.00 pack-year smoking history. He has never used smokeless tobacco. He reports that he drinks about 1.2 oz of alcohol per week . He reports that he uses drugs, including Cocaine. PHYSICAL EXAM     INITIAL VITALS:  height is 6' 3\" (1.905 m) and weight is 200 lb (90.7 kg). His oral temperature is 97.4 °F (36.3 °C). His blood pressure is 141/63 (abnormal) and his pulse is 100. His respiration is 18 and oxygen saturation is 95%. Physical Exam   Constitutional: He is oriented to person, place, and time. He appears well-developed and well-nourished. HENT:   Head: Normocephalic and atraumatic. Eyes: Pupils are equal, round, and reactive to light. Right eye exhibits no discharge. Left eye exhibits no discharge. No scleral icterus. Neck: Normal range of motion. Neck supple. No tracheal deviation present. Cardiovascular: Normal rate, regular rhythm and normal heart sounds. Exam reveals no gallop and no friction rub. No murmur heard. Pulmonary/Chest: Effort normal. No stridor. No respiratory distress. He has no wheezes. Abdominal: Soft. There is no tenderness. There is no rebound and no guarding. Musculoskeletal: He exhibits no edema or tenderness. Neurological: He is alert and oriented to person, place, and time. No cranial nerve deficit. Coordination normal.   Skin: Skin is warm and dry. He is not diaphoretic. Psychiatric: He has a normal mood and affect.  His behavior is normal. Judgment and thought (*)     BUN 53 (*)     All other components within normal limits   BRAIN NATRIURETIC PEPTIDE - Abnormal; Notable for the following:     Pro-BNP 89279.0 (*)     All other components within normal limits   TROPONIN - Abnormal; Notable for the following:     Troponin T 0.038 (*)     All other components within normal limits   URINE WITH REFLEXED MICRO - Abnormal; Notable for the following:     Glucose, Ur 100 (*)     Blood, Urine TRACE (*)     Protein,  (*)     All other components within normal limits   GLOMERULAR FILTRATION RATE, ESTIMATED - Abnormal; Notable for the following:     Est, Glom Filt Rate 8 (*)     All other components within normal limits   TROPONIN - Abnormal; Notable for the following:     Troponin T 0.033 (*)     All other components within normal limits   TROPONIN - Abnormal; Notable for the following:     Troponin T 0.034 (*)     All other components within normal limits   URINE DRUG SCREEN   ANION GAP   OSMOLALITY   TROPONIN       EMERGENCY DEPARTMENT COURSE:   Vitals:    Vitals:    02/04/18 0115 02/04/18 0145 02/04/18 0215 02/04/18 0230   BP: (!) 151/70 (!) 158/74 (!) 163/74 (!) 141/63   Pulse: 98 102 101 100   Resp:       Temp:       TempSrc:       SpO2:       Weight:       Height:             Patient is seen and evaluated in a timely fashion. Cardene drip is started. Ativan and Benadryl IV are given. Chest x-ray has no fluid overload sign. EKG has high peak T wave. He was given D50, insulin and Calcium Gluconate. Labs are back: Potassium 4.7. Other labs are stable for him. UDS positive for Cocaine. He is admitted to Hospitalist service. While waiting for a bed, he came diaphoretic and agitated. Repeated EKG shows: Sinus tachycardia;  bpm; NE interval 188 ms;  ms; QTc 510 ms  No ST elevation. Head CT shows no acute intracranial findings. Repeated Troponin is stable.        CRITICAL CARE:   CRITICAL CARE: There was a high probability of clinically significant/life threatening deterioration in this patient's condition which required my urgent intervention. Total critical care time was 30 minutes. This excludes any time for separately reportable procedures. CONSULTS:  Hospitalist    PROCEDURES:  None    FINAL IMPRESSION      1. Hypertensive emergency    2. ESRD on hemodialysis ([facility]    3. Nondependent cocaine abuse    4. Elevated troponin    5.  Noncompliance          DISPOSITION/PLAN   Admit    PATIENT REFERRED TO:  Gerardo Wiggins MD  [facility], [address]  [address] [id number]            DISCHARGE MEDICATIONS:  Current Discharge Medication List          (Please note that portions of this note were completed with a voice recognition program.  Efforts were made to edit the dictations but occasionally words are mis-transcribed.)    MD Lisette Arias MD  02/04/18 [id number]

## 2018-02-04 PROBLEM — Z20.828 EXPOSURE TO INFLUENZA: Status: ACTIVE | Noted: 2018-02-04

## 2018-02-04 PROBLEM — Z91.158 NONCOMPLIANCE OF PATIENT WITH RENAL DIALYSIS: Status: ACTIVE | Noted: 2018-02-04

## 2018-02-04 PROBLEM — Z91.15 NONCOMPLIANCE OF PATIENT WITH RENAL DIALYSIS (HCC): Status: ACTIVE | Noted: 2018-02-04

## 2018-02-04 PROBLEM — N18.5 ANEMIA ASSOCIATED WITH STAGE 5 CHRONIC RENAL FAILURE (HCC): Status: ACTIVE | Noted: 2018-02-04

## 2018-02-04 PROBLEM — J18.9 PNEUMONIA DUE TO ORGANISM: Status: RESOLVED | Noted: 2017-09-29 | Resolved: 2018-02-04

## 2018-02-04 PROBLEM — D63.1 ANEMIA ASSOCIATED WITH STAGE 5 CHRONIC RENAL FAILURE (HCC): Status: ACTIVE | Noted: 2018-02-04

## 2018-02-04 LAB
ALBUMIN SERPL-MCNC: 3.4 G/DL (ref 3.5–5.1)
ANION GAP SERPL CALCULATED.3IONS-SCNC: 16 MEQ/L (ref 8–16)
BUN BLDV-MCNC: 61 MG/DL (ref 7–22)
CALCIUM SERPL-MCNC: 8.1 MG/DL (ref 8.5–10.5)
CHLORIDE BLD-SCNC: 100 MEQ/L (ref 98–111)
CO2: 26 MEQ/L (ref 23–33)
CREAT SERPL-MCNC: 10.4 MG/DL (ref 0.4–1.2)
FLU A ANTIGEN: NEGATIVE
FLU B ANTIGEN: NEGATIVE
GFR SERPL CREATININE-BSD FRML MDRD: 6 ML/MIN/1.73M2
GLUCOSE BLD-MCNC: 200 MG/DL (ref 70–108)
MAGNESIUM: 2.5 MG/DL (ref 1.6–2.4)
PHOSPHORUS: 4.5 MG/DL (ref 2.4–4.7)
POTASSIUM SERPL-SCNC: 5.1 MEQ/L (ref 3.5–5.2)
SODIUM BLD-SCNC: 142 MEQ/L (ref 135–145)
TROPONIN T: 0.03 NG/ML
TROPONIN T: 0.03 NG/ML

## 2018-02-04 PROCEDURE — 2500000003 HC RX 250 WO HCPCS: Performed by: INTERNAL MEDICINE

## 2018-02-04 PROCEDURE — 2580000003 HC RX 258: Performed by: HOSPITALIST

## 2018-02-04 PROCEDURE — 84484 ASSAY OF TROPONIN QUANT: CPT

## 2018-02-04 PROCEDURE — 2500000003 HC RX 250 WO HCPCS: Performed by: EMERGENCY MEDICINE

## 2018-02-04 PROCEDURE — 83735 ASSAY OF MAGNESIUM: CPT

## 2018-02-04 PROCEDURE — 99222 1ST HOSP IP/OBS MODERATE 55: CPT | Performed by: NUCLEAR MEDICINE

## 2018-02-04 PROCEDURE — 36415 COLL VENOUS BLD VENIPUNCTURE: CPT

## 2018-02-04 PROCEDURE — 6360000002 HC RX W HCPCS: Performed by: HOSPITALIST

## 2018-02-04 PROCEDURE — 80069 RENAL FUNCTION PANEL: CPT

## 2018-02-04 PROCEDURE — 99233 SBSQ HOSP IP/OBS HIGH 50: CPT | Performed by: INTERNAL MEDICINE

## 2018-02-04 PROCEDURE — 6370000000 HC RX 637 (ALT 250 FOR IP): Performed by: INTERNAL MEDICINE

## 2018-02-04 PROCEDURE — 99221 1ST HOSP IP/OBS SF/LOW 40: CPT | Performed by: INTERNAL MEDICINE

## 2018-02-04 PROCEDURE — 6370000000 HC RX 637 (ALT 250 FOR IP): Performed by: HOSPITALIST

## 2018-02-04 PROCEDURE — 87804 INFLUENZA ASSAY W/OPTIC: CPT

## 2018-02-04 PROCEDURE — 2140000000 HC CCU INTERMEDIATE R&B

## 2018-02-04 PROCEDURE — 2700000000 HC OXYGEN THERAPY PER DAY

## 2018-02-04 RX ORDER — HYDRALAZINE HYDROCHLORIDE 20 MG/ML
20 INJECTION INTRAMUSCULAR; INTRAVENOUS EVERY 6 HOURS PRN
Status: DISCONTINUED | OUTPATIENT
Start: 2018-02-04 | End: 2018-02-05 | Stop reason: HOSPADM

## 2018-02-04 RX ORDER — MINOXIDIL 2.5 MG/1
10 TABLET ORAL 2 TIMES DAILY
Status: DISCONTINUED | OUTPATIENT
Start: 2018-02-04 | End: 2018-02-05 | Stop reason: HOSPADM

## 2018-02-04 RX ADMIN — CLONIDINE HYDROCHLORIDE 0.3 MG: 0.2 TABLET ORAL at 19:42

## 2018-02-04 RX ADMIN — NICARDIPINE HYDROCHLORIDE 4 MG/HR: 0.1 INJECTION, SOLUTION INTRAVENOUS at 05:27

## 2018-02-04 RX ADMIN — NICARDIPINE HYDROCHLORIDE 5 MG/HR: 0.1 INJECTION, SOLUTION INTRAVENOUS at 11:36

## 2018-02-04 RX ADMIN — NICARDIPINE HYDROCHLORIDE 10 MG/HR: 0.1 INJECTION, SOLUTION INTRAVENOUS at 02:44

## 2018-02-04 RX ADMIN — DOXAZOSIN 4 MG: 4 TABLET ORAL at 00:10

## 2018-02-04 RX ADMIN — ISOSORBIDE MONONITRATE 120 MG: 60 TABLET ORAL at 09:05

## 2018-02-04 RX ADMIN — VITAMIN D, TAB 1000IU (100/BT) 2000 UNITS: 25 TAB at 22:41

## 2018-02-04 RX ADMIN — MINOXIDIL 10 MG: 2.5 TABLET ORAL at 15:08

## 2018-02-04 RX ADMIN — HEPARIN SODIUM 5000 UNITS: 5000 INJECTION, SOLUTION INTRAVENOUS; SUBCUTANEOUS at 06:29

## 2018-02-04 RX ADMIN — DOXAZOSIN 4 MG: 4 TABLET ORAL at 22:42

## 2018-02-04 RX ADMIN — Medication 10 ML: at 22:42

## 2018-02-04 RX ADMIN — NICARDIPINE HYDROCHLORIDE 3 MG/HR: 0.1 INJECTION, SOLUTION INTRAVENOUS at 16:03

## 2018-02-04 RX ADMIN — FAMOTIDINE 20 MG: 20 TABLET, FILM COATED ORAL at 09:05

## 2018-02-04 RX ADMIN — FLUTICASONE PROPIONATE 1 SPRAY: 50 SPRAY, METERED NASAL at 09:06

## 2018-02-04 RX ADMIN — VITAMIN D, TAB 1000IU (100/BT) 2000 UNITS: 25 TAB at 15:08

## 2018-02-04 RX ADMIN — NICARDIPINE HYDROCHLORIDE 15 MG/HR: 0.1 INJECTION, SOLUTION INTRAVENOUS at 00:49

## 2018-02-04 RX ADMIN — VERAPAMIL HYDROCHLORIDE 240 MG: 240 TABLET, FILM COATED, EXTENDED RELEASE ORAL at 09:06

## 2018-02-04 RX ADMIN — SEVELAMER CARBONATE 1600 MG: 800 TABLET, FILM COATED ORAL at 09:05

## 2018-02-04 RX ADMIN — SERTRALINE 50 MG: 50 TABLET, FILM COATED ORAL at 09:05

## 2018-02-04 RX ADMIN — CLONIDINE HYDROCHLORIDE 0.3 MG: 0.2 TABLET ORAL at 15:08

## 2018-02-04 RX ADMIN — MULTIPLE VITAMINS W/ MINERALS TAB 1 TABLET: TAB at 09:05

## 2018-02-04 RX ADMIN — CLONIDINE HYDROCHLORIDE 0.3 MG: 0.2 TABLET ORAL at 09:05

## 2018-02-04 RX ADMIN — HEPARIN SODIUM 5000 UNITS: 5000 INJECTION, SOLUTION INTRAVENOUS; SUBCUTANEOUS at 15:08

## 2018-02-04 RX ADMIN — HEPARIN SODIUM 5000 UNITS: 5000 INJECTION, SOLUTION INTRAVENOUS; SUBCUTANEOUS at 00:13

## 2018-02-04 RX ADMIN — FLUTICASONE PROPIONATE 1 SPRAY: 50 SPRAY, METERED NASAL at 00:10

## 2018-02-04 RX ADMIN — SEVELAMER CARBONATE 1600 MG: 800 TABLET, FILM COATED ORAL at 11:37

## 2018-02-04 RX ADMIN — FLUTICASONE PROPIONATE 1 SPRAY: 50 SPRAY, METERED NASAL at 22:41

## 2018-02-04 RX ADMIN — MINOXIDIL 10 MG: 2.5 TABLET ORAL at 22:42

## 2018-02-04 RX ADMIN — SEVELAMER CARBONATE 1600 MG: 800 TABLET, FILM COATED ORAL at 16:56

## 2018-02-04 RX ADMIN — CLONIDINE HYDROCHLORIDE 0.3 MG: 0.2 TABLET ORAL at 00:10

## 2018-02-04 RX ADMIN — HEPARIN SODIUM 5000 UNITS: 5000 INJECTION, SOLUTION INTRAVENOUS; SUBCUTANEOUS at 22:43

## 2018-02-04 ASSESSMENT — ENCOUNTER SYMPTOMS
EYE ITCHING: 0
COUGH: 0
SHORTNESS OF BREATH: 0
VOMITING: 0
WHEEZING: 0
NAUSEA: 0
RHINORRHEA: 0
ABDOMINAL DISTENTION: 0
DIARRHEA: 0
EYE DISCHARGE: 0
ABDOMINAL PAIN: 0

## 2018-02-04 ASSESSMENT — PAIN SCALES - GENERAL
PAINLEVEL_OUTOF10: 0
PAINLEVEL_OUTOF10: 0

## 2018-02-04 NOTE — ED NOTES
Patient transported to [facility]   in stable condition. Patient monitored on telemetry.          Patrice Bautista LPN  [id number]

## 2018-02-04 NOTE — CONSULTS
[facility]    [facility]  [address]  [facility], [address] [place] Nephrology Consult      2/4/2018 7:04 AM         Pt Name:    Sudheer Webb  MRN:     [id number]   [id number]  YOB: 1965  Admit Date:    2/3/2018  5:04 PM  Primary Care Physician:  Val Vilchis MD   Room Number:  3B-30/030-A   Reason for Consult:  Management of ESRD treated by hemodialysis  Requesting Providor: Dr. Justin Dean  Primary Nephrologist: Dr. Sylvia Adamson Location:                3B-30/030-A    ### ISOLATION ###:   none     Admit Chief Complaint: uncontrolled blood pressure     History of Chief Complaint:   The patient is a 48y.o. year old black male who receives hemodialysis in [facility] to treat ESRD from HTN and DM. He frequently skips dialysis to use cocaine. He did this again and presented to ER with blood pressure out of control. He denied N/V/C/D/SOB/CP. He is not short of breath. Nephrology is following the patient for: ESRD and hemodialysis     24 hour summary:   The patient was relaxing in bed. He feels well. His blood pressure is much better. He is not short of breath and stated that he could make it until tomorrow for dialysis. Baseline eGFR Hemodialysis patient   Admit eGFR eGFR < 15 ml/min   Current eGFR      Compliance with treatment plan: 20%     Allergies and Intolerances: ALLERGIES: Review of patient's allergies indicates no known allergies.   MEDICATION INTOLERANCES: none  FOOD ALLERGIES: none  INSECT ALLERGIES: none  PLANT ALLERGIES: none     Past Medical History:  Past Medical History:   Diagnosis Date    AAA (abdominal aortic aneurysm) (HCC)     Acute on chronic diastolic CHF (congestive heart failure), NYHA class 2 ([address]     NURIS (acute kidney injury) ([facility] 9/24/2015    Anemia associated with chronic renal failure     Arthritis     stated in hands    Cocaine abuse 5/10/2014    Depression     Diabetes mellitus ([address]     pt states Date Taking? Authorizing Provider   famotidine (PEPCID) 20 MG tablet Take 20 mg by mouth daily    Historical Provider, MD   cloNIDine (CATAPRES) 0.3 MG tablet Take 1 tablet by mouth three times daily 8/6/17   Neo Martin MD   Cholecalciferol (VITAMIN D) 2000 units CAPS capsule Take by mouth 2 times daily    Historical Provider, MD   minoxidil (LONITEN) 10 MG tablet One tab twice daily 5/6/17   Sotero Saint, MD   verapamil (CALAN SR) 240 MG extended release tablet Take 1 tablet by mouth daily 12/26/16   Sotero Saint, MD   doxazosin (CARDURA) 4 MG tablet Take 1 tablet by mouth nightly 12/26/16   Sotero Saint, MD   isosorbide mononitrate (IMDUR) 120 MG extended release tablet Take 1 tablet by mouth daily 12/26/16   Sotero Saint, MD   sevelamer (RENVELA) 800 MG tablet Take 2 tablets by mouth 3 times daily (with meals) 12/26/16   Sotero Saint, MD   sertraline (ZOLOFT) 50 MG tablet Take 1 tablet by mouth daily 11/8/16   Halina Finley CNP   Multiple Vitamins-Minerals (THERAPEUTIC MULTIVITAMIN-MINERALS) tablet Take 1 tablet by mouth daily    Historical Provider, MD   fluticasone (FLONASE) 50 MCG/ACT nasal spray 1 spray by Nasal route 2 times daily     Historical Provider, MD        Lab data:  CBC:    Recent Labs      02/03/18   1811   WBC  6.7   HGB  10.1*   PLT  157     CMP:    Recent Labs      02/03/18   1811   NA  142   K  4.7   CL  98   CO2  28   BUN  53*   CREATININE  8.5*   GLUCOSE  77   CALCIUM  8.8     Urine:  Recent Labs      02/03/18   1724   COLORU  YELLOW   PHUR  8.0   WBCUA  NONE SEEN   RBCUA  0-2   YEAST  NONE SEEN   BACTERIA  NONE   LEUKOCYTESUR  NEGATIVE   UROBILINOGEN  0.2   BILIRUBINUR  NEGATIVE   BLOODU  TRACE*      Sed Rate: No results for input(s): Familia Powell in the last 72 hours.          Radiology       Review of Systems:  Source of data: patient    CONSTITUTIONAL  Fever: none, Chills: none, Weight loss: none, Malaise: none, Fatigue: none, Diaphoresis: none,   Weakness:

## 2018-02-04 NOTE — H&P
History & Physical        Patient:  Minesh Evans  YOB: 1965    MRN: [id number]     Acct: [de-identified]    PCP: Camila Flores MD    Date of Admission: 2/3/2018    Date of Service: Pt seen/examined on 02/03/2018 and Admitted to Inpatient with expected LOS greater than two midnights due to medical therapy. Chief Complaint:  HTN crisis      History Of Present Illness: Patient difficult to arouse, therefore information has been gathered from EMR   review. 48 y.o. male who presented to [facility] with elevated blood pressures. Apparently he did cocaine at 8 am.He has has been non complaint with medications and HD , missing last HD treatments. Per chart no complaints of chest pain or SOB. Past Medical History:          Diagnosis Date    AAA (abdominal aortic aneurysm) (HCC)     Acute on chronic diastolic CHF (congestive heart failure), NYHA class 2 ([facility]     NURIS (acute kidney injury) ([facility] 9/24/2015    Anemia associated with chronic renal failure     Arthritis     stated in hands    Cocaine abuse 5/10/2014    Depression     Diabetes mellitus ([facility]     pt states he no longer has diabetes he has lost alot of davion.      Diastolic heart failure secondary to coronary artery disease (HCC)     FSGS (focal segmental glomerulosclerosis) 5/23/2013    Hemodialysis patient ([facility] 10/17/2016    on hemod[facility] with [facility] 1/16/2012    History of blood transfusion     Hyperlipidemia     Hyperphosphatemia 5/21/2016    Hypertension     Left renal artery stenosis ([facility] 5/22/2014    Monoclonal (M) protein disease, multiple 'M' protein     Nicotine dependence 6/16/2014    Noncompliance     Pneumonia     Psychiatric problem     PTSD (post-traumatic stress disorder)     Pt vet from [place]    Secondary hyperparathyroidism (of renal origin)     Sleep apnea        Past Surgical History:          Procedure Laterality Date    Normal examination  EKG:  I have reviewed the EKG with the following interpretation: NSR, no ST changes, LVH    XR CHEST PORTABLE   Final Result      1. Linear densities are noted at the right lung base which may represent atelectasis or effusion. Underlying infectious infiltrate is not completely excluded. 2. There is a small to moderate right-sided pleural effusion. **This report has been created using voice recognition software. It may contain minor errors which are inherent in voice recognition technology. **      Final report electronically signed by Dr. Von Suarez on 2/3/2018 6:01 PM      CT HEAD WO CONTRAST    (Results Pending)            DVT prophylaxis: [] Lovenox                                 [x] SCDs                                 [x] SQ Heparin                                 [] Encourage ambulation           [] Already on Anticoagulation    Code Status: Prior          Disposition:    [x] Home       [] TCU       [] Rehab       [] Psych       [] SNF       [] [place]       [] Other-    ASSESSMENT:    C/[facility] Problems    Diagnosis Date Noted    Hypertensive emergency  cocaine induced [I16.1] 05/21/2016     Priority: High     Class: Acute       PLAN:    1. HTN crisis  -Admit to ICU  -Will continue Cardene gtt to lowe SBP to 25% in the next two hours  -Will continue to trend cardiac enzymes  -Resume home anti hypertensives  -CT head negative for acute IC process  -Cardiology consult requested    2. ESRD on HD  -Nephrology consult for HD orders    3. Anemia of Chronic disease  -Monitor CBC closely  -No overt bleeding    4. Elevation in Tr  -Continue to trend them,probably due to demand ischemia from cocaine abuse versus HTN crisis      Thank you Terri Yen MD for the opportunity to be involved in this patient's care.     Electronically signed by Ezekiel Francisco MD on 2/3/2018 at 9:40 PM

## 2018-02-05 VITALS
RESPIRATION RATE: 18 BRPM | WEIGHT: 217.37 LBS | TEMPERATURE: 98.7 F | HEIGHT: 75 IN | HEART RATE: 90 BPM | OXYGEN SATURATION: 95 % | SYSTOLIC BLOOD PRESSURE: 190 MMHG | BODY MASS INDEX: 27.03 KG/M2 | DIASTOLIC BLOOD PRESSURE: 93 MMHG

## 2018-02-05 LAB
ALBUMIN SERPL-MCNC: 3.5 G/DL (ref 3.5–5.1)
ANION GAP SERPL CALCULATED.3IONS-SCNC: 16 MEQ/L (ref 8–16)
BASOPHILS # BLD: 0.6 %
BASOPHILS ABSOLUTE: 0 THOU/MM3 (ref 0–0.1)
BUN BLDV-MCNC: 68 MG/DL (ref 7–22)
CALCIUM SERPL-MCNC: 8.6 MG/DL (ref 8.5–10.5)
CHLORIDE BLD-SCNC: 101 MEQ/L (ref 98–111)
CO2: 24 MEQ/L (ref 23–33)
CREAT SERPL-MCNC: 11.2 MG/DL (ref 0.4–1.2)
EKG ATRIAL RATE: 103 BPM
EKG P AXIS: 71 DEGREES
EKG P-R INTERVAL: 188 MS
EKG Q-T INTERVAL: 390 MS
EKG QRS DURATION: 100 MS
EKG QTC CALCULATION (BAZETT): 510 MS
EKG R AXIS: -38 DEGREES
EKG T AXIS: 86 DEGREES
EKG VENTRICULAR RATE: 103 BPM
EOSINOPHIL # BLD: 4.8 %
EOSINOPHILS ABSOLUTE: 0.2 THOU/MM3 (ref 0–0.4)
GFR SERPL CREATININE-BSD FRML MDRD: 6 ML/MIN/1.73M2
GLUCOSE BLD-MCNC: 91 MG/DL (ref 70–108)
HBV SURFACE AB TITR SER: POSITIVE {TITER}
HCT VFR BLD CALC: 28.1 % (ref 42–52)
HEMOGLOBIN: 9.5 GM/DL (ref 14–18)
HEPATITIS B CORE IGM ANTIBODY: NEGATIVE
HEPATITIS B SURFACE ANTIGEN: NEGATIVE
LYMPHOCYTES # BLD: 20.8 %
LYMPHOCYTES ABSOLUTE: 1 THOU/MM3 (ref 1–4.8)
MAGNESIUM: 2.8 MG/DL (ref 1.6–2.4)
MCH RBC QN AUTO: 33.1 PG (ref 27–31)
MCHC RBC AUTO-ENTMCNC: 33.7 GM/DL (ref 33–37)
MCV RBC AUTO: 98.2 FL (ref 80–94)
MONOCYTES # BLD: 8 %
MONOCYTES ABSOLUTE: 0.4 THOU/MM3 (ref 0.4–1.3)
NUCLEATED RED BLOOD CELLS: 0 /100 WBC
PDW BLD-RTO: 14.4 % (ref 11.5–14.5)
PHOSPHORUS: 4.7 MG/DL (ref 2.4–4.7)
PLATELET # BLD: 126 THOU/MM3 (ref 130–400)
PMV BLD AUTO: 8.3 FL (ref 7.4–10.4)
POTASSIUM SERPL-SCNC: 4.8 MEQ/L (ref 3.5–5.2)
RBC # BLD: 2.86 MILL/MM3 (ref 4.7–6.1)
SEG NEUTROPHILS: 65.8 %
SEGMENTED NEUTROPHILS ABSOLUTE COUNT: 3.2 THOU/MM3 (ref 1.8–7.7)
SODIUM BLD-SCNC: 141 MEQ/L (ref 135–145)
WBC # BLD: 4.8 THOU/MM3 (ref 4.8–10.8)

## 2018-02-05 PROCEDURE — 99239 HOSP IP/OBS DSCHRG MGMT >30: CPT | Performed by: INTERNAL MEDICINE

## 2018-02-05 PROCEDURE — 5A1D70Z PERFORMANCE OF URINARY FILTRATION, INTERMITTENT, LESS THAN 6 HOURS PER DAY: ICD-10-PCS | Performed by: INTERNAL MEDICINE

## 2018-02-05 PROCEDURE — 36415 COLL VENOUS BLD VENIPUNCTURE: CPT

## 2018-02-05 PROCEDURE — 6370000000 HC RX 637 (ALT 250 FOR IP): Performed by: HOSPITALIST

## 2018-02-05 PROCEDURE — 86706 HEP B SURFACE ANTIBODY: CPT

## 2018-02-05 PROCEDURE — 6360000002 HC RX W HCPCS: Performed by: HOSPITALIST

## 2018-02-05 PROCEDURE — 80069 RENAL FUNCTION PANEL: CPT

## 2018-02-05 PROCEDURE — 90935 HEMODIALYSIS ONE EVALUATION: CPT | Performed by: INTERNAL MEDICINE

## 2018-02-05 PROCEDURE — 86705 HEP B CORE ANTIBODY IGM: CPT

## 2018-02-05 PROCEDURE — 2580000003 HC RX 258: Performed by: HOSPITALIST

## 2018-02-05 PROCEDURE — A4614 HAND-HELD PEFR METER: HCPCS

## 2018-02-05 PROCEDURE — 6370000000 HC RX 637 (ALT 250 FOR IP): Performed by: INTERNAL MEDICINE

## 2018-02-05 PROCEDURE — 85025 COMPLETE CBC W/AUTO DIFF WBC: CPT

## 2018-02-05 PROCEDURE — 90935 HEMODIALYSIS ONE EVALUATION: CPT

## 2018-02-05 PROCEDURE — 6360000002 HC RX W HCPCS: Performed by: INTERNAL MEDICINE

## 2018-02-05 PROCEDURE — 87340 HEPATITIS B SURFACE AG IA: CPT

## 2018-02-05 PROCEDURE — 83735 ASSAY OF MAGNESIUM: CPT

## 2018-02-05 RX ORDER — ISOSORBIDE MONONITRATE 120 MG/1
120 TABLET, EXTENDED RELEASE ORAL DAILY
Qty: 30 TABLET | Refills: 0 | Status: ON HOLD | OUTPATIENT
Start: 2018-02-05 | End: 2018-03-23 | Stop reason: HOSPADM

## 2018-02-05 RX ORDER — DOXAZOSIN 8 MG/1
8 TABLET ORAL NIGHTLY
Qty: 30 TABLET | Refills: 0 | Status: ON HOLD | OUTPATIENT
Start: 2018-02-05 | End: 2018-03-23 | Stop reason: HOSPADM

## 2018-02-05 RX ORDER — VERAPAMIL HYDROCHLORIDE 240 MG/1
240 TABLET, FILM COATED, EXTENDED RELEASE ORAL DAILY
Qty: 30 TABLET | Refills: 0 | Status: ON HOLD | OUTPATIENT
Start: 2018-02-05 | End: 2018-03-23 | Stop reason: HOSPADM

## 2018-02-05 RX ORDER — MINOXIDIL 10 MG/1
TABLET ORAL
Qty: 60 TABLET | Refills: 0 | Status: ON HOLD | OUTPATIENT
Start: 2018-02-05 | End: 2018-03-23 | Stop reason: HOSPADM

## 2018-02-05 RX ORDER — DOXAZOSIN MESYLATE 4 MG/1
8 TABLET ORAL NIGHTLY
Status: DISCONTINUED | OUTPATIENT
Start: 2018-02-05 | End: 2018-02-05 | Stop reason: HOSPADM

## 2018-02-05 RX ORDER — LORAZEPAM 0.5 MG/1
0.5 TABLET ORAL EVERY 4 HOURS PRN
Status: DISCONTINUED | OUTPATIENT
Start: 2018-02-05 | End: 2018-02-05 | Stop reason: HOSPADM

## 2018-02-05 RX ORDER — CLONIDINE HYDROCHLORIDE 0.3 MG/1
0.3 TABLET ORAL 3 TIMES DAILY
Qty: 90 TABLET | Refills: 0 | Status: ON HOLD | OUTPATIENT
Start: 2018-02-05 | End: 2018-03-23 | Stop reason: HOSPADM

## 2018-02-05 RX ADMIN — ISOSORBIDE MONONITRATE 120 MG: 60 TABLET ORAL at 06:56

## 2018-02-05 RX ADMIN — VERAPAMIL HYDROCHLORIDE 240 MG: 240 TABLET, FILM COATED, EXTENDED RELEASE ORAL at 06:59

## 2018-02-05 RX ADMIN — FAMOTIDINE 20 MG: 20 TABLET, FILM COATED ORAL at 12:54

## 2018-02-05 RX ADMIN — MINOXIDIL 10 MG: 2.5 TABLET ORAL at 06:57

## 2018-02-05 RX ADMIN — VITAMIN D, TAB 1000IU (100/BT) 2000 UNITS: 25 TAB at 12:53

## 2018-02-05 RX ADMIN — SERTRALINE 50 MG: 50 TABLET, FILM COATED ORAL at 12:53

## 2018-02-05 RX ADMIN — FLUTICASONE PROPIONATE 1 SPRAY: 50 SPRAY, METERED NASAL at 12:53

## 2018-02-05 RX ADMIN — HYDRALAZINE HYDROCHLORIDE 20 MG: 20 INJECTION INTRAMUSCULAR; INTRAVENOUS at 04:09

## 2018-02-05 RX ADMIN — SEVELAMER CARBONATE 1600 MG: 800 TABLET, FILM COATED ORAL at 12:54

## 2018-02-05 RX ADMIN — CLONIDINE HYDROCHLORIDE 0.3 MG: 0.2 TABLET ORAL at 15:35

## 2018-02-05 RX ADMIN — CLONIDINE HYDROCHLORIDE 0.3 MG: 0.2 TABLET ORAL at 06:54

## 2018-02-05 RX ADMIN — MULTIPLE VITAMINS W/ MINERALS TAB 1 TABLET: TAB at 12:53

## 2018-02-05 RX ADMIN — SEVELAMER CARBONATE 1600 MG: 800 TABLET, FILM COATED ORAL at 06:58

## 2018-02-05 RX ADMIN — HEPARIN SODIUM 5000 UNITS: 5000 INJECTION, SOLUTION INTRAVENOUS; SUBCUTANEOUS at 06:19

## 2018-02-05 RX ADMIN — Medication 10 ML: at 12:56

## 2018-02-05 NOTE — CARE COORDINATION
2/5/18, 1:25 PM  Usha Parekh       Admitted from: ED 2/3/2018/ [address] [facility] day: 2   Location: 3B-30/030-A Reason for admit: Hypertensive emergency [I16.1]  Hypertensive emergency [I16.1] Status: inpt  Admit order signed?: yes  PMH:  has a past medical history of AAA (abdominal aortic aneurysm) ([facility] [address] Acute on chronic diastolic CHF (congestive heart failure), NYHA class 2 ([facility] [address] [facility] NURIS (acute kidney injury) ([facility] [address] Anemia associated with chronic renal failure; Arthritis; Cocaine abuse; Depression; Diabetes mellitus ([facility] [address] [facility] Diastolic heart failure secondary to coronary artery disease (HCC); FSGS (focal segmental glomerulosclerosis); Hemodialysis patient [facility]); Hemorrhoids; History of blood transfusion; Hyperlipidemia; Hyperphosphatemia; Hypertension; Left renal artery stenosis ([facility] [address] [facility] Monoclonal (M) protein disease, multiple 'M' protein; Nicotine dependence; Noncompliance; Pneumonia; Psychiatric problem; PTSD (post-traumatic stress disorder); Secondary hyperparathyroidism (of renal origin); and Sleep apnea. Medications:  Scheduled Meds:   doxazosin  8 mg Oral Nightly    minoxidil  10 mg Oral BID    vitamin D  2,000 Units Oral BID    fluticasone  1 spray Nasal BID    therapeutic multivitamin-minerals  1 tablet Oral Daily    sertraline  50 mg Oral Daily    verapamil  240 mg Oral Daily    isosorbide mononitrate  120 mg Oral Daily    sevelamer  1,600 mg Oral TID WC    cloNIDine  0.3 mg Oral TID    famotidine  20 mg Oral Daily    sodium chloride flush  10 mL Intravenous 2 times per day    heparin (porcine)  5,000 Units Subcutaneous 3 times per day     Continuous Infusions:   niCARdipine in sodium chloride Stopped (02/04/18 1844)      Pertinent Info/Orders/Treatment Plan: Cardene gtt is off, medications adjusted, /84, pt had dialysis this am, on fluid restriction. Diet: DIET RENAL; Low Sodium (2 GM);  Daily Fluid Restriction: 2000 ml   Vital Signs: BP (!) 156/84   Pulse 96   Temp 98.1 °F (36.7 °C)

## 2018-02-05 NOTE — CONSULTS
Renal Progress Note    Assessment and Plan:    1. ESRD on hemodialysis   2. Accelrtaed hypertension  3. DM 2  4. Anemia of chronic disease   5. Thrombocytopenia   PLAN:  Reviewed labs   Reviewed medications   Chest xray report and images reviewed   Hemodialysis today or tomorrow depending on staff  No immediate need for hemodialysis today  Increase doxazosin to 8 mg po daily from 4 mg po daily     Patient Active Problem List:     FSGS (focal segmental glomerulosclerosis)     Diabetes mellitus type 2, controlled ([address]     Monoclonal (M) protein disease, multiple 'M' protein     PTSD (post-traumatic stress disorder)     Secondary renal hyperparathyroidism (HCC)     Severe alcohol use disorder (HCC)     Severe cocaine use disorder ([address]     Uncontrolled stage 2 hypertension     LVH (left ventricular hypertrophy) due to hypertensive disease     Chronic diastolic heart failure (HCC)     FH: HTN (hypertension)     Hypertrophic cardiomyopathy ([address]     Diet-controlled diabetes mellitus ([address]     A-V fistula (HCC)     Chronic thoracic aortic dissection (HCC)     Hyperphosphatemia     Anemia in CKD (chronic kidney disease)     Vitamin D deficiency     Thrombocytopenia (HCC)     Personality disorder     Tobacco abuse     Metabolic acidosis     Precordial pain     ESRD on hemodialysis (HCC)     Edema of upper extremity     Hypertensive urgency     End stage renal disease on dialysis (HCC)     Hyperglycemia     Elevated troponin     Hyperkalemia     Cocaine abuse, continuous - reports spending $100 on cocaine \"every other day\"     Homelessness     Moderate episode of recurrent major depressive disorder ([address]     Exposure to influenza     Noncompliance of patient with renal dialysis ([address]     Anemia associated with stage 5 chronic renal failure (HCC)      Subjective:   Admit Date: 2/3/2018    Interval History:   Seeing for ESRD on hemodialysis   Awake and alert this am  Denies any concern  Blood pressure remains high Medications:   Scheduled Meds:   doxazosin  8 mg Oral Nightly    minoxidil  10 mg Oral BID    vitamin D  2,000 Units Oral BID    fluticasone  1 spray Nasal BID    therapeutic multivitamin-minerals  1 tablet Oral Daily    sertraline  50 mg Oral Daily    verapamil  240 mg Oral Daily    isosorbide mononitrate  120 mg Oral Daily    sevelamer  1,600 mg Oral TID WC    cloNIDine  0.3 mg Oral TID    famotidine  20 mg Oral Daily    sodium chloride flush  10 mL Intravenous 2 times per day    heparin (porcine)  5,000 Units Subcutaneous 3 times per day     Continuous Infusions:   niCARdipine in sodium chloride Stopped (02/04/18 1844)       CBC:   Recent Labs      02/03/18   1811 02/05/18   0400   WBC  6.7  4.8   HGB  10.1*  9.5*   PLT  157  126*     CMP:  Recent Labs      02/03/18 1811 02/04/18   1214  02/05/18   0400   NA  142  142  141   K  4.7  5.1  4.8   CL  98  100  101   CO2  28  26  24   BUN  53*  61*  68*   CREATININE  8.5*  10.4*  11.2*   GLUCOSE  77  200*  91   CALCIUM  8.8  8.1*  8.6   LABGLOM  8*  6*  6*     Troponin: No results for input(s): TROPONINI in the last 72 hours. BNP: No results for input(s): BNP in the last 72 hours. INR: No results for input(s): INR in the last 72 hours. Lipids: No results for input(s): CHOL, LDLDIRECT, TRIG, HDL, AMYLASE, LIPASE in the last 72 hours. Liver: Recent Labs      02/03/18 1811 02/05/18   0400   AST  18   --    --    ALT  16   --    --    ALKPHOS  103   --    --    PROT  6.9   --    --    LABALBU  4.0   < >  3.5   BILITOT  0.4   --    --     < > = values in this interval not displayed. Iron:  No results for input(s): IRONS, FERRITIN in the last 72 hours.     Invalid input(s): LABIRONS    Objective:   Vitals: BP (!) 169/95   Pulse 100   Temp 98.3 °F (36.8 °C) (Oral)   Resp 18   Ht 6' 3\" (1.905 m)   Wt 224 lb 14.4 oz (102 kg)   SpO2 95%   BMI 28.11 kg/m²    Wt Readings from Last 3 Encounters:   02/05/18 224 lb 14.4 oz (102 kg)

## 2018-03-06 ENCOUNTER — APPOINTMENT (OUTPATIENT)
Dept: CT IMAGING | Age: 53
DRG: 252 | End: 2018-03-06
Payer: MEDICARE

## 2018-03-06 ENCOUNTER — HOSPITAL ENCOUNTER (INPATIENT)
Age: 53
LOS: 4 days | Discharge: PSYCHIATRIC OR SUBSTANCE USE FACILITY | DRG: 252 | End: 2018-03-10
Attending: FAMILY MEDICINE | Admitting: FAMILY MEDICINE
Payer: MEDICARE

## 2018-03-06 ENCOUNTER — APPOINTMENT (OUTPATIENT)
Dept: GENERAL RADIOLOGY | Age: 53
DRG: 252 | End: 2018-03-06
Payer: MEDICARE

## 2018-03-06 DIAGNOSIS — F14.10 COCAINE ABUSE (HCC): ICD-10-CM

## 2018-03-06 DIAGNOSIS — I16.1 HYPERTENSIVE EMERGENCY: Primary | ICD-10-CM

## 2018-03-06 LAB
ALBUMIN SERPL-MCNC: 4 G/DL (ref 3.5–5.1)
ALP BLD-CCNC: 92 U/L (ref 38–126)
ALT SERPL-CCNC: 16 U/L (ref 11–66)
AMPHETAMINE+METHAMPHETAMINE URINE SCREEN: NEGATIVE
ANION GAP SERPL CALCULATED.3IONS-SCNC: 22 MEQ/L (ref 8–16)
ANISOCYTOSIS: ABNORMAL
AST SERPL-CCNC: 22 U/L (ref 5–40)
BARBITURATE QUANTITATIVE URINE: NEGATIVE
BASOPHILS # BLD: 0.3 %
BASOPHILS ABSOLUTE: 0 THOU/MM3 (ref 0–0.1)
BENZODIAZEPINE QUANTITATIVE URINE: NEGATIVE
BILIRUB SERPL-MCNC: 0.4 MG/DL (ref 0.3–1.2)
BUN BLDV-MCNC: 73 MG/DL (ref 7–22)
CALCIUM SERPL-MCNC: 9.2 MG/DL (ref 8.5–10.5)
CANNABINOID QUANTITATIVE URINE: NEGATIVE
CHLORIDE BLD-SCNC: 94 MEQ/L (ref 98–111)
CO2: 21 MEQ/L (ref 23–33)
COCAINE METABOLITE QUANTITATIVE URINE: POSITIVE
CREAT SERPL-MCNC: 10.7 MG/DL (ref 0.4–1.2)
EKG ATRIAL RATE: 96 BPM
EKG P AXIS: 61 DEGREES
EKG P-R INTERVAL: 198 MS
EKG Q-T INTERVAL: 384 MS
EKG QRS DURATION: 100 MS
EKG QTC CALCULATION (BAZETT): 485 MS
EKG R AXIS: -52 DEGREES
EKG T AXIS: 81 DEGREES
EKG VENTRICULAR RATE: 96 BPM
EOSINOPHIL # BLD: 2.1 %
EOSINOPHILS ABSOLUTE: 0.2 THOU/MM3 (ref 0–0.4)
ETHYL ALCOHOL, SERUM: < 0.01 %
GFR SERPL CREATININE-BSD FRML MDRD: 6 ML/MIN/1.73M2
GLUCOSE BLD-MCNC: 95 MG/DL (ref 70–108)
HCT VFR BLD CALC: 30.2 % (ref 42–52)
HEMOGLOBIN: 10.1 GM/DL (ref 14–18)
LACTIC ACID: 0.6 MMOL/L (ref 0.5–2.2)
LIPASE: 126.8 U/L (ref 5.6–51.3)
LYMPHOCYTES # BLD: 8.6 %
LYMPHOCYTES ABSOLUTE: 0.7 THOU/MM3 (ref 1–4.8)
MCH RBC QN AUTO: 32.6 PG (ref 27–31)
MCHC RBC AUTO-ENTMCNC: 33.3 GM/DL (ref 33–37)
MCV RBC AUTO: 97.9 FL (ref 80–94)
MONOCYTES # BLD: 6.4 %
MONOCYTES ABSOLUTE: 0.5 THOU/MM3 (ref 0.4–1.3)
NUCLEATED RED BLOOD CELLS: 0 /100 WBC
OPIATES, URINE: NEGATIVE
OSMOLALITY CALCULATION: 295.2 MOSMOL/KG (ref 275–300)
OXYCODONE: NEGATIVE
PDW BLD-RTO: 15.6 % (ref 11.5–14.5)
PHENCYCLIDINE QUANTITATIVE URINE: NEGATIVE
PLATELET # BLD: 178 THOU/MM3 (ref 130–400)
PMV BLD AUTO: 7 FL (ref 7.4–10.4)
POTASSIUM SERPL-SCNC: 4.5 MEQ/L (ref 3.5–5.2)
PRO-BNP: ABNORMAL PG/ML (ref 0–900)
RBC # BLD: 3.09 MILL/MM3 (ref 4.7–6.1)
SEG NEUTROPHILS: 82.6 %
SEGMENTED NEUTROPHILS ABSOLUTE COUNT: 6.4 THOU/MM3 (ref 1.8–7.7)
SODIUM BLD-SCNC: 137 MEQ/L (ref 135–145)
TOTAL PROTEIN: 6.9 G/DL (ref 6.1–8)
TROPONIN T: 0.03 NG/ML
TSH SERPL DL<=0.05 MIU/L-ACNC: 1.16 UIU/ML (ref 0.4–4.2)
WBC # BLD: 7.8 THOU/MM3 (ref 4.8–10.8)

## 2018-03-06 PROCEDURE — 36415 COLL VENOUS BLD VENIPUNCTURE: CPT

## 2018-03-06 PROCEDURE — 83690 ASSAY OF LIPASE: CPT

## 2018-03-06 PROCEDURE — 80307 DRUG TEST PRSMV CHEM ANLYZR: CPT

## 2018-03-06 PROCEDURE — 6360000002 HC RX W HCPCS: Performed by: FAMILY MEDICINE

## 2018-03-06 PROCEDURE — 84443 ASSAY THYROID STIM HORMONE: CPT

## 2018-03-06 PROCEDURE — 83880 ASSAY OF NATRIURETIC PEPTIDE: CPT

## 2018-03-06 PROCEDURE — 2580000003 HC RX 258: Performed by: FAMILY MEDICINE

## 2018-03-06 PROCEDURE — 71045 X-RAY EXAM CHEST 1 VIEW: CPT

## 2018-03-06 PROCEDURE — 99284 EMERGENCY DEPT VISIT MOD MDM: CPT

## 2018-03-06 PROCEDURE — 2500000003 HC RX 250 WO HCPCS: Performed by: FAMILY MEDICINE

## 2018-03-06 PROCEDURE — G0480 DRUG TEST DEF 1-7 CLASSES: HCPCS

## 2018-03-06 PROCEDURE — 6370000000 HC RX 637 (ALT 250 FOR IP): Performed by: FAMILY MEDICINE

## 2018-03-06 PROCEDURE — 99223 1ST HOSP IP/OBS HIGH 75: CPT | Performed by: FAMILY MEDICINE

## 2018-03-06 PROCEDURE — 93005 ELECTROCARDIOGRAM TRACING: CPT | Performed by: FAMILY MEDICINE

## 2018-03-06 PROCEDURE — 96374 THER/PROPH/DIAG INJ IV PUSH: CPT

## 2018-03-06 PROCEDURE — 83605 ASSAY OF LACTIC ACID: CPT

## 2018-03-06 PROCEDURE — 93010 ELECTROCARDIOGRAM REPORT: CPT | Performed by: INTERNAL MEDICINE

## 2018-03-06 PROCEDURE — 2140000000 HC CCU INTERMEDIATE R&B

## 2018-03-06 PROCEDURE — 84145 PROCALCITONIN (PCT): CPT

## 2018-03-06 PROCEDURE — 84484 ASSAY OF TROPONIN QUANT: CPT

## 2018-03-06 PROCEDURE — 85025 COMPLETE CBC W/AUTO DIFF WBC: CPT

## 2018-03-06 PROCEDURE — 80053 COMPREHEN METABOLIC PANEL: CPT

## 2018-03-06 RX ORDER — HEPARIN SODIUM 5000 [USP'U]/ML
5000 INJECTION, SOLUTION INTRAVENOUS; SUBCUTANEOUS 2 TIMES DAILY
Status: DISCONTINUED | OUTPATIENT
Start: 2018-03-06 | End: 2018-03-10 | Stop reason: HOSPADM

## 2018-03-06 RX ORDER — ISOSORBIDE MONONITRATE 60 MG/1
120 TABLET, EXTENDED RELEASE ORAL DAILY
Status: DISCONTINUED | OUTPATIENT
Start: 2018-03-07 | End: 2018-03-10 | Stop reason: HOSPADM

## 2018-03-06 RX ORDER — 0.9 % SODIUM CHLORIDE 0.9 %
500 INTRAVENOUS SOLUTION INTRAVENOUS ONCE
Status: COMPLETED | OUTPATIENT
Start: 2018-03-06 | End: 2018-03-06

## 2018-03-06 RX ORDER — ACETAMINOPHEN 325 MG/1
650 TABLET ORAL EVERY 4 HOURS PRN
Status: DISCONTINUED | OUTPATIENT
Start: 2018-03-06 | End: 2018-03-10 | Stop reason: HOSPADM

## 2018-03-06 RX ORDER — MINOXIDIL 2.5 MG/1
10 TABLET ORAL 2 TIMES DAILY
Status: DISCONTINUED | OUTPATIENT
Start: 2018-03-06 | End: 2018-03-10

## 2018-03-06 RX ORDER — SODIUM CHLORIDE 0.9 % (FLUSH) 0.9 %
10 SYRINGE (ML) INJECTION PRN
Status: DISCONTINUED | OUTPATIENT
Start: 2018-03-06 | End: 2018-03-10 | Stop reason: HOSPADM

## 2018-03-06 RX ORDER — SEVELAMER CARBONATE 800 MG/1
1600 TABLET, FILM COATED ORAL
Status: DISCONTINUED | OUTPATIENT
Start: 2018-03-07 | End: 2018-03-10 | Stop reason: HOSPADM

## 2018-03-06 RX ORDER — FLUTICASONE PROPIONATE 50 MCG
1 SPRAY, SUSPENSION (ML) NASAL 2 TIMES DAILY
Status: DISCONTINUED | OUTPATIENT
Start: 2018-03-06 | End: 2018-03-10 | Stop reason: HOSPADM

## 2018-03-06 RX ORDER — M-VIT,TX,IRON,MINS/CALC/FOLIC 27MG-0.4MG
1 TABLET ORAL DAILY
Status: DISCONTINUED | OUTPATIENT
Start: 2018-03-07 | End: 2018-03-10 | Stop reason: HOSPADM

## 2018-03-06 RX ORDER — FAMOTIDINE 20 MG/1
20 TABLET, FILM COATED ORAL 2 TIMES DAILY
Status: DISCONTINUED | OUTPATIENT
Start: 2018-03-06 | End: 2018-03-06 | Stop reason: SDUPTHER

## 2018-03-06 RX ORDER — KETOROLAC TROMETHAMINE 30 MG/ML
30 INJECTION, SOLUTION INTRAMUSCULAR; INTRAVENOUS ONCE
Status: COMPLETED | OUTPATIENT
Start: 2018-03-06 | End: 2018-03-06

## 2018-03-06 RX ORDER — VERAPAMIL HYDROCHLORIDE 240 MG/1
240 TABLET, FILM COATED, EXTENDED RELEASE ORAL DAILY
Status: DISCONTINUED | OUTPATIENT
Start: 2018-03-07 | End: 2018-03-10 | Stop reason: HOSPADM

## 2018-03-06 RX ORDER — ONDANSETRON 2 MG/ML
4 INJECTION INTRAMUSCULAR; INTRAVENOUS EVERY 6 HOURS PRN
Status: DISCONTINUED | OUTPATIENT
Start: 2018-03-06 | End: 2018-03-10 | Stop reason: HOSPADM

## 2018-03-06 RX ORDER — FAMOTIDINE 20 MG/1
20 TABLET, FILM COATED ORAL DAILY
Status: DISCONTINUED | OUTPATIENT
Start: 2018-03-07 | End: 2018-03-10 | Stop reason: HOSPADM

## 2018-03-06 RX ORDER — SODIUM CHLORIDE 0.9 % (FLUSH) 0.9 %
10 SYRINGE (ML) INJECTION EVERY 12 HOURS SCHEDULED
Status: DISCONTINUED | OUTPATIENT
Start: 2018-03-06 | End: 2018-03-10 | Stop reason: HOSPADM

## 2018-03-06 RX ORDER — NICOTINE 21 MG/24HR
1 PATCH, TRANSDERMAL 24 HOURS TRANSDERMAL DAILY
Status: DISCONTINUED | OUTPATIENT
Start: 2018-03-07 | End: 2018-03-10 | Stop reason: HOSPADM

## 2018-03-06 RX ORDER — DOXAZOSIN MESYLATE 4 MG/1
8 TABLET ORAL NIGHTLY
Status: DISCONTINUED | OUTPATIENT
Start: 2018-03-06 | End: 2018-03-10 | Stop reason: HOSPADM

## 2018-03-06 RX ADMIN — SODIUM CHLORIDE 500 ML: 9 INJECTION, SOLUTION INTRAVENOUS at 18:46

## 2018-03-06 RX ADMIN — KETOROLAC TROMETHAMINE 30 MG: 30 INJECTION, SOLUTION INTRAMUSCULAR at 18:47

## 2018-03-06 RX ADMIN — HEPARIN SODIUM 5000 UNITS: 5000 INJECTION, SOLUTION INTRAVENOUS; SUBCUTANEOUS at 23:09

## 2018-03-06 RX ADMIN — DEXTROSE MONOHYDRATE 6.5 MG/HR: 50 INJECTION, SOLUTION INTRAVENOUS at 23:15

## 2018-03-06 RX ADMIN — FLUTICASONE PROPIONATE 1 SPRAY: 50 SPRAY, METERED NASAL at 23:09

## 2018-03-06 RX ADMIN — DEXTROSE MONOHYDRATE 5 MG/HR: 50 INJECTION, SOLUTION INTRAVENOUS at 19:30

## 2018-03-06 RX ADMIN — DOXAZOSIN 8 MG: 4 TABLET ORAL at 23:09

## 2018-03-06 RX ADMIN — VITAMIN D, TAB 1000IU (100/BT) 2000 UNITS: 25 TAB at 23:09

## 2018-03-06 ASSESSMENT — ENCOUNTER SYMPTOMS
WHEEZING: 0
RHINORRHEA: 0
EYE DISCHARGE: 0
SHORTNESS OF BREATH: 0
SORE THROAT: 0
VOMITING: 0
EYE REDNESS: 0
NAUSEA: 0
COUGH: 0
DIARRHEA: 0
BACK PAIN: 1
ABDOMINAL PAIN: 0

## 2018-03-06 ASSESSMENT — SLEEP AND FATIGUE QUESTIONNAIRES
DO YOU HAVE DIFFICULTY SLEEPING: YES
SLEEP PATTERN: DIFFICULTY FALLING ASLEEP;DISTURBED/INTERRUPTED SLEEP
DO YOU USE A SLEEP AID: NO
DIFFICULTY FALLING ASLEEP: YES
DIFFICULTY STAYING ASLEEP: YES
RESTFUL SLEEP: NO
DIFFICULTY ARISING: NO

## 2018-03-06 ASSESSMENT — LIFESTYLE VARIABLES: HISTORY_ALCOHOL_USE: YES

## 2018-03-06 ASSESSMENT — PATIENT HEALTH QUESTIONNAIRE - PHQ9: SUM OF ALL RESPONSES TO PHQ QUESTIONS 1-9: 7

## 2018-03-06 ASSESSMENT — PAIN SCALES - GENERAL: PAINLEVEL_OUTOF10: 8

## 2018-03-07 ENCOUNTER — APPOINTMENT (OUTPATIENT)
Dept: CT IMAGING | Age: 53
DRG: 252 | End: 2018-03-07
Payer: MEDICARE

## 2018-03-07 LAB
ANION GAP SERPL CALCULATED.3IONS-SCNC: 22 MEQ/L (ref 8–16)
ANISOCYTOSIS: ABNORMAL
BACTERIA: ABNORMAL
BASOPHILS # BLD: 0.4 %
BASOPHILS ABSOLUTE: 0 THOU/MM3 (ref 0–0.1)
BILIRUBIN URINE: NEGATIVE
BLOOD, URINE: NEGATIVE
BUN BLDV-MCNC: 78 MG/DL (ref 7–22)
CALCIUM SERPL-MCNC: 9 MG/DL (ref 8.5–10.5)
CASTS: ABNORMAL /LPF
CASTS: ABNORMAL /LPF
CHARACTER, URINE: ABNORMAL
CHLORIDE BLD-SCNC: 96 MEQ/L (ref 98–111)
CO2: 23 MEQ/L (ref 23–33)
COLOR: YELLOW
CREAT SERPL-MCNC: 11.3 MG/DL (ref 0.4–1.2)
CRYSTALS: ABNORMAL
EOSINOPHIL # BLD: 3.4 %
EOSINOPHILS ABSOLUTE: 0.2 THOU/MM3 (ref 0–0.4)
EPITHELIAL CELLS, UA: ABNORMAL /HPF
GFR SERPL CREATININE-BSD FRML MDRD: 6 ML/MIN/1.73M2
GLUCOSE BLD-MCNC: 102 MG/DL (ref 70–108)
GLUCOSE, URINE: NEGATIVE MG/DL
HCT VFR BLD CALC: 29.1 % (ref 42–52)
HEMOGLOBIN: 9.8 GM/DL (ref 14–18)
INR BLD: 0.97 (ref 0.85–1.13)
KETONES, URINE: NEGATIVE
LEUKOCYTE EST, POC: NEGATIVE
LV EF: 60 %
LVEF MODALITY: NORMAL
LYMPHOCYTES # BLD: 14.2 %
LYMPHOCYTES ABSOLUTE: 0.8 THOU/MM3 (ref 1–4.8)
MCH RBC QN AUTO: 32.8 PG (ref 27–31)
MCHC RBC AUTO-ENTMCNC: 33.6 GM/DL (ref 33–37)
MCV RBC AUTO: 97.4 FL (ref 80–94)
MISCELLANEOUS LAB TEST RESULT: ABNORMAL
MONOCYTES # BLD: 8.4 %
MONOCYTES ABSOLUTE: 0.5 THOU/MM3 (ref 0.4–1.3)
NITRITE, URINE: NEGATIVE
NUCLEATED RED BLOOD CELLS: 0 /100 WBC
PDW BLD-RTO: 15.6 % (ref 11.5–14.5)
PH UA: 7.5
PLATELET # BLD: 155 THOU/MM3 (ref 130–400)
PMV BLD AUTO: 7.3 FL (ref 7.4–10.4)
POTASSIUM REFLEX MAGNESIUM: 4.5 MEQ/L (ref 3.5–5.2)
PROCALCITONIN: 0.38 NG/ML (ref 0.01–0.09)
PROTEIN UA: 100 MG/DL
RBC # BLD: 2.99 MILL/MM3 (ref 4.7–6.1)
RBC URINE: ABNORMAL /HPF
RENAL EPITHELIAL, UA: ABNORMAL
SEG NEUTROPHILS: 73.6 %
SEGMENTED NEUTROPHILS ABSOLUTE COUNT: 4.3 THOU/MM3 (ref 1.8–7.7)
SODIUM BLD-SCNC: 141 MEQ/L (ref 135–145)
SPECIFIC GRAVITY UA: 1.01 (ref 1–1.03)
TROPONIN T: 0.03 NG/ML
TROPONIN T: 0.04 NG/ML
UROBILINOGEN, URINE: 0.2 EU/DL
WBC # BLD: 5.8 THOU/MM3 (ref 4.8–10.8)
WBC UA: ABNORMAL /HPF
YEAST: ABNORMAL

## 2018-03-07 PROCEDURE — 84484 ASSAY OF TROPONIN QUANT: CPT

## 2018-03-07 PROCEDURE — 2500000003 HC RX 250 WO HCPCS: Performed by: FAMILY MEDICINE

## 2018-03-07 PROCEDURE — 85610 PROTHROMBIN TIME: CPT

## 2018-03-07 PROCEDURE — 99221 1ST HOSP IP/OBS SF/LOW 40: CPT | Performed by: INTERNAL MEDICINE

## 2018-03-07 PROCEDURE — 2580000003 HC RX 258: Performed by: FAMILY MEDICINE

## 2018-03-07 PROCEDURE — 2140000000 HC CCU INTERMEDIATE R&B

## 2018-03-07 PROCEDURE — 81001 URINALYSIS AUTO W/SCOPE: CPT

## 2018-03-07 PROCEDURE — 6370000000 HC RX 637 (ALT 250 FOR IP): Performed by: FAMILY MEDICINE

## 2018-03-07 PROCEDURE — 80048 BASIC METABOLIC PNL TOTAL CA: CPT

## 2018-03-07 PROCEDURE — 99232 SBSQ HOSP IP/OBS MODERATE 35: CPT | Performed by: HOSPITALIST

## 2018-03-07 PROCEDURE — 5A1D70Z PERFORMANCE OF URINARY FILTRATION, INTERMITTENT, LESS THAN 6 HOURS PER DAY: ICD-10-PCS | Performed by: INTERNAL MEDICINE

## 2018-03-07 PROCEDURE — 85025 COMPLETE CBC W/AUTO DIFF WBC: CPT

## 2018-03-07 PROCEDURE — 93306 TTE W/DOPPLER COMPLETE: CPT

## 2018-03-07 PROCEDURE — 6360000002 HC RX W HCPCS: Performed by: FAMILY MEDICINE

## 2018-03-07 PROCEDURE — 36415 COLL VENOUS BLD VENIPUNCTURE: CPT

## 2018-03-07 PROCEDURE — 71250 CT THORAX DX C-: CPT

## 2018-03-07 RX ADMIN — FAMOTIDINE 20 MG: 20 TABLET, FILM COATED ORAL at 08:00

## 2018-03-07 RX ADMIN — MINOXIDIL 10 MG: 2.5 TABLET ORAL at 21:40

## 2018-03-07 RX ADMIN — HEPARIN SODIUM 5000 UNITS: 5000 INJECTION, SOLUTION INTRAVENOUS; SUBCUTANEOUS at 21:40

## 2018-03-07 RX ADMIN — ISOSORBIDE MONONITRATE 120 MG: 60 TABLET ORAL at 08:00

## 2018-03-07 RX ADMIN — VITAMIN D, TAB 1000IU (100/BT) 2000 UNITS: 25 TAB at 08:00

## 2018-03-07 RX ADMIN — MINOXIDIL 10 MG: 2.5 TABLET ORAL at 08:00

## 2018-03-07 RX ADMIN — SEVELAMER CARBONATE 1600 MG: 800 TABLET, FILM COATED ORAL at 18:17

## 2018-03-07 RX ADMIN — FLUTICASONE PROPIONATE 1 SPRAY: 50 SPRAY, METERED NASAL at 21:41

## 2018-03-07 RX ADMIN — MULTIPLE VITAMINS W/ MINERALS TAB 1 TABLET: TAB at 08:00

## 2018-03-07 RX ADMIN — SEVELAMER CARBONATE 1600 MG: 800 TABLET, FILM COATED ORAL at 08:00

## 2018-03-07 RX ADMIN — Medication 10 ML: at 21:41

## 2018-03-07 RX ADMIN — DEXTROSE MONOHYDRATE 5.5 MG/HR: 50 INJECTION, SOLUTION INTRAVENOUS at 04:36

## 2018-03-07 RX ADMIN — VERAPAMIL HYDROCHLORIDE 240 MG: 240 TABLET, FILM COATED, EXTENDED RELEASE ORAL at 10:13

## 2018-03-07 RX ADMIN — VANCOMYCIN HYDROCHLORIDE 1500 MG: 1 INJECTION, POWDER, LYOPHILIZED, FOR SOLUTION INTRAVENOUS at 09:37

## 2018-03-07 RX ADMIN — VITAMIN D, TAB 1000IU (100/BT) 2000 UNITS: 25 TAB at 21:40

## 2018-03-07 RX ADMIN — DOXAZOSIN 8 MG: 4 TABLET ORAL at 21:40

## 2018-03-07 RX ADMIN — CEFTRIAXONE 1 G: 1 INJECTION, POWDER, FOR SOLUTION INTRAMUSCULAR; INTRAVENOUS at 06:00

## 2018-03-07 RX ADMIN — HEPARIN SODIUM 5000 UNITS: 5000 INJECTION, SOLUTION INTRAVENOUS; SUBCUTANEOUS at 09:36

## 2018-03-07 RX ADMIN — SERTRALINE 50 MG: 50 TABLET, FILM COATED ORAL at 08:00

## 2018-03-07 RX ADMIN — MINOXIDIL 10 MG: 2.5 TABLET ORAL at 01:06

## 2018-03-08 LAB
ANION GAP SERPL CALCULATED.3IONS-SCNC: 17 MEQ/L (ref 8–16)
ANISOCYTOSIS: ABNORMAL
BASOPHILS # BLD: 0.4 %
BASOPHILS ABSOLUTE: 0 THOU/MM3 (ref 0–0.1)
BUN BLDV-MCNC: 48 MG/DL (ref 7–22)
CALCIUM SERPL-MCNC: 9.1 MG/DL (ref 8.5–10.5)
CHLORIDE BLD-SCNC: 92 MEQ/L (ref 98–111)
CO2: 27 MEQ/L (ref 23–33)
CREAT SERPL-MCNC: 8.4 MG/DL (ref 0.4–1.2)
EOSINOPHIL # BLD: 3.7 %
EOSINOPHILS ABSOLUTE: 0.1 THOU/MM3 (ref 0–0.4)
GFR SERPL CREATININE-BSD FRML MDRD: 8 ML/MIN/1.73M2
GLUCOSE BLD-MCNC: 95 MG/DL (ref 70–108)
HCT VFR BLD CALC: 29.4 % (ref 42–52)
HEMOGLOBIN: 9.9 GM/DL (ref 14–18)
INR BLD: 1.01 (ref 0.85–1.13)
LYMPHOCYTES # BLD: 16.6 %
LYMPHOCYTES ABSOLUTE: 0.7 THOU/MM3 (ref 1–4.8)
MCH RBC QN AUTO: 32.7 PG (ref 27–31)
MCHC RBC AUTO-ENTMCNC: 33.7 GM/DL (ref 33–37)
MCV RBC AUTO: 96.9 FL (ref 80–94)
MONOCYTES # BLD: 11.3 %
MONOCYTES ABSOLUTE: 0.5 THOU/MM3 (ref 0.4–1.3)
NUCLEATED RED BLOOD CELLS: 0 /100 WBC
PDW BLD-RTO: 15.2 % (ref 11.5–14.5)
PLATELET # BLD: 130 THOU/MM3 (ref 130–400)
PMV BLD AUTO: 7.1 FL (ref 7.4–10.4)
POTASSIUM SERPL-SCNC: 4.8 MEQ/L (ref 3.5–5.2)
RBC # BLD: 3.03 MILL/MM3 (ref 4.7–6.1)
SEG NEUTROPHILS: 68 %
SEGMENTED NEUTROPHILS ABSOLUTE COUNT: 2.7 THOU/MM3 (ref 1.8–7.7)
SODIUM BLD-SCNC: 136 MEQ/L (ref 135–145)
WBC # BLD: 4 THOU/MM3 (ref 4.8–10.8)

## 2018-03-08 PROCEDURE — 2140000000 HC CCU INTERMEDIATE R&B

## 2018-03-08 PROCEDURE — 80048 BASIC METABOLIC PNL TOTAL CA: CPT

## 2018-03-08 PROCEDURE — 90935 HEMODIALYSIS ONE EVALUATION: CPT

## 2018-03-08 PROCEDURE — 99232 SBSQ HOSP IP/OBS MODERATE 35: CPT | Performed by: INTERNAL MEDICINE

## 2018-03-08 PROCEDURE — 6360000002 HC RX W HCPCS: Performed by: FAMILY MEDICINE

## 2018-03-08 PROCEDURE — 2580000003 HC RX 258: Performed by: FAMILY MEDICINE

## 2018-03-08 PROCEDURE — 85025 COMPLETE CBC W/AUTO DIFF WBC: CPT

## 2018-03-08 PROCEDURE — 99232 SBSQ HOSP IP/OBS MODERATE 35: CPT | Performed by: HOSPITALIST

## 2018-03-08 PROCEDURE — 6360000002 HC RX W HCPCS: Performed by: HOSPITALIST

## 2018-03-08 PROCEDURE — 85610 PROTHROMBIN TIME: CPT

## 2018-03-08 PROCEDURE — 36415 COLL VENOUS BLD VENIPUNCTURE: CPT

## 2018-03-08 PROCEDURE — 6370000000 HC RX 637 (ALT 250 FOR IP): Performed by: FAMILY MEDICINE

## 2018-03-08 PROCEDURE — 2700000000 HC OXYGEN THERAPY PER DAY

## 2018-03-08 RX ORDER — SERTRALINE HYDROCHLORIDE 100 MG/1
100 TABLET, FILM COATED ORAL DAILY
Status: DISCONTINUED | OUTPATIENT
Start: 2018-03-09 | End: 2018-03-10 | Stop reason: HOSPADM

## 2018-03-08 RX ORDER — VANCOMYCIN HYDROCHLORIDE 1 G/200ML
1000 INJECTION, SOLUTION INTRAVENOUS ONCE
Status: COMPLETED | OUTPATIENT
Start: 2018-03-08 | End: 2018-03-08

## 2018-03-08 RX ADMIN — Medication 10 ML: at 09:04

## 2018-03-08 RX ADMIN — ISOSORBIDE MONONITRATE 120 MG: 60 TABLET ORAL at 09:03

## 2018-03-08 RX ADMIN — SEVELAMER CARBONATE 1600 MG: 800 TABLET, FILM COATED ORAL at 16:44

## 2018-03-08 RX ADMIN — MULTIPLE VITAMINS W/ MINERALS TAB 1 TABLET: TAB at 09:03

## 2018-03-08 RX ADMIN — SERTRALINE 50 MG: 50 TABLET, FILM COATED ORAL at 09:03

## 2018-03-08 RX ADMIN — HEPARIN SODIUM 5000 UNITS: 5000 INJECTION, SOLUTION INTRAVENOUS; SUBCUTANEOUS at 09:03

## 2018-03-08 RX ADMIN — VANCOMYCIN HYDROCHLORIDE 1000 MG: 1 INJECTION, SOLUTION INTRAVENOUS at 16:44

## 2018-03-08 RX ADMIN — Medication 10 ML: at 21:03

## 2018-03-08 RX ADMIN — VITAMIN D, TAB 1000IU (100/BT) 2000 UNITS: 25 TAB at 20:58

## 2018-03-08 RX ADMIN — FLUTICASONE PROPIONATE 1 SPRAY: 50 SPRAY, METERED NASAL at 09:04

## 2018-03-08 RX ADMIN — HEPARIN SODIUM 5000 UNITS: 5000 INJECTION, SOLUTION INTRAVENOUS; SUBCUTANEOUS at 20:59

## 2018-03-08 RX ADMIN — FAMOTIDINE 20 MG: 20 TABLET, FILM COATED ORAL at 09:03

## 2018-03-08 RX ADMIN — DOXAZOSIN 8 MG: 4 TABLET ORAL at 20:58

## 2018-03-08 RX ADMIN — VITAMIN D, TAB 1000IU (100/BT) 2000 UNITS: 25 TAB at 09:03

## 2018-03-08 RX ADMIN — VERAPAMIL HYDROCHLORIDE 240 MG: 240 TABLET, FILM COATED, EXTENDED RELEASE ORAL at 09:36

## 2018-03-08 RX ADMIN — MINOXIDIL 10 MG: 2.5 TABLET ORAL at 09:03

## 2018-03-08 RX ADMIN — MINOXIDIL 10 MG: 2.5 TABLET ORAL at 20:58

## 2018-03-08 RX ADMIN — CEFTRIAXONE 1 G: 1 INJECTION, POWDER, FOR SOLUTION INTRAMUSCULAR; INTRAVENOUS at 10:14

## 2018-03-08 RX ADMIN — SEVELAMER CARBONATE 1600 MG: 800 TABLET, FILM COATED ORAL at 09:03

## 2018-03-08 RX ADMIN — FLUTICASONE PROPIONATE 1 SPRAY: 50 SPRAY, METERED NASAL at 20:59

## 2018-03-09 LAB — INR BLD: 1 (ref 0.85–1.13)

## 2018-03-09 PROCEDURE — 6370000000 HC RX 637 (ALT 250 FOR IP): Performed by: FAMILY MEDICINE

## 2018-03-09 PROCEDURE — 99232 SBSQ HOSP IP/OBS MODERATE 35: CPT | Performed by: HOSPITALIST

## 2018-03-09 PROCEDURE — 36415 COLL VENOUS BLD VENIPUNCTURE: CPT

## 2018-03-09 PROCEDURE — 6360000002 HC RX W HCPCS: Performed by: FAMILY MEDICINE

## 2018-03-09 PROCEDURE — 2140000000 HC CCU INTERMEDIATE R&B

## 2018-03-09 PROCEDURE — 6370000000 HC RX 637 (ALT 250 FOR IP): Performed by: PSYCHIATRY & NEUROLOGY

## 2018-03-09 PROCEDURE — 85610 PROTHROMBIN TIME: CPT

## 2018-03-09 PROCEDURE — 2580000003 HC RX 258: Performed by: FAMILY MEDICINE

## 2018-03-09 PROCEDURE — 99232 SBSQ HOSP IP/OBS MODERATE 35: CPT | Performed by: NURSE PRACTITIONER

## 2018-03-09 RX ORDER — CLONIDINE HYDROCHLORIDE 0.2 MG/1
0.2 TABLET ORAL 2 TIMES DAILY
Status: DISCONTINUED | OUTPATIENT
Start: 2018-03-09 | End: 2018-03-10 | Stop reason: HOSPADM

## 2018-03-09 RX ORDER — LISINOPRIL 5 MG/1
5 TABLET ORAL DAILY
Status: DISCONTINUED | OUTPATIENT
Start: 2018-03-09 | End: 2018-03-09

## 2018-03-09 RX ADMIN — ISOSORBIDE MONONITRATE 120 MG: 60 TABLET ORAL at 07:40

## 2018-03-09 RX ADMIN — FAMOTIDINE 20 MG: 20 TABLET, FILM COATED ORAL at 07:41

## 2018-03-09 RX ADMIN — Medication 10 ML: at 11:41

## 2018-03-09 RX ADMIN — Medication 10 ML: at 19:43

## 2018-03-09 RX ADMIN — MINOXIDIL 10 MG: 2.5 TABLET ORAL at 19:43

## 2018-03-09 RX ADMIN — VITAMIN D, TAB 1000IU (100/BT) 2000 UNITS: 25 TAB at 07:39

## 2018-03-09 RX ADMIN — FLUTICASONE PROPIONATE 1 SPRAY: 50 SPRAY, METERED NASAL at 19:44

## 2018-03-09 RX ADMIN — HEPARIN SODIUM 5000 UNITS: 5000 INJECTION, SOLUTION INTRAVENOUS; SUBCUTANEOUS at 07:41

## 2018-03-09 RX ADMIN — SEVELAMER CARBONATE 1600 MG: 800 TABLET, FILM COATED ORAL at 18:46

## 2018-03-09 RX ADMIN — HEPARIN SODIUM 5000 UNITS: 5000 INJECTION, SOLUTION INTRAVENOUS; SUBCUTANEOUS at 19:44

## 2018-03-09 RX ADMIN — CLONIDINE HYDROCHLORIDE 0.2 MG: 0.2 TABLET ORAL at 08:58

## 2018-03-09 RX ADMIN — MULTIPLE VITAMINS W/ MINERALS TAB 1 TABLET: TAB at 07:40

## 2018-03-09 RX ADMIN — SERTRALINE 100 MG: 100 TABLET, FILM COATED ORAL at 07:40

## 2018-03-09 RX ADMIN — CEFTRIAXONE 1 G: 1 INJECTION, POWDER, FOR SOLUTION INTRAMUSCULAR; INTRAVENOUS at 04:27

## 2018-03-09 RX ADMIN — VERAPAMIL HYDROCHLORIDE 240 MG: 240 TABLET, FILM COATED, EXTENDED RELEASE ORAL at 04:25

## 2018-03-09 RX ADMIN — DOXAZOSIN 8 MG: 4 TABLET ORAL at 19:44

## 2018-03-09 RX ADMIN — SEVELAMER CARBONATE 1600 MG: 800 TABLET, FILM COATED ORAL at 11:39

## 2018-03-09 RX ADMIN — VITAMIN D, TAB 1000IU (100/BT) 2000 UNITS: 25 TAB at 19:44

## 2018-03-09 RX ADMIN — SEVELAMER CARBONATE 1600 MG: 800 TABLET, FILM COATED ORAL at 07:40

## 2018-03-09 RX ADMIN — MINOXIDIL 10 MG: 2.5 TABLET ORAL at 07:40

## 2018-03-09 RX ADMIN — FLUTICASONE PROPIONATE 1 SPRAY: 50 SPRAY, METERED NASAL at 07:41

## 2018-03-09 ASSESSMENT — PAIN SCALES - GENERAL
PAINLEVEL_OUTOF10: 0

## 2018-03-10 ENCOUNTER — HOSPITAL ENCOUNTER (INPATIENT)
Age: 53
LOS: 7 days | Discharge: ANOTHER HOSPITAL | DRG: 252 | End: 2018-03-17
Attending: PSYCHIATRY & NEUROLOGY | Admitting: PSYCHIATRY & NEUROLOGY
Payer: MEDICARE

## 2018-03-10 VITALS
DIASTOLIC BLOOD PRESSURE: 74 MMHG | BODY MASS INDEX: 26.75 KG/M2 | HEART RATE: 82 BPM | WEIGHT: 215.17 LBS | RESPIRATION RATE: 16 BRPM | OXYGEN SATURATION: 97 % | TEMPERATURE: 98.3 F | SYSTOLIC BLOOD PRESSURE: 156 MMHG | HEIGHT: 75 IN

## 2018-03-10 LAB
ANION GAP SERPL CALCULATED.3IONS-SCNC: 17 MEQ/L (ref 8–16)
BUN BLDV-MCNC: 49 MG/DL (ref 7–22)
CALCIUM SERPL-MCNC: 9.3 MG/DL (ref 8.5–10.5)
CHLORIDE BLD-SCNC: 98 MEQ/L (ref 98–111)
CO2: 25 MEQ/L (ref 23–33)
CREAT SERPL-MCNC: 9 MG/DL (ref 0.4–1.2)
GFR SERPL CREATININE-BSD FRML MDRD: 8 ML/MIN/1.73M2
GLUCOSE BLD-MCNC: 80 MG/DL (ref 70–108)
HCT VFR BLD CALC: 28.3 % (ref 42–52)
HEMOGLOBIN: 9.8 GM/DL (ref 14–18)
INR BLD: 0.99 (ref 0.85–1.13)
MCH RBC QN AUTO: 33.6 PG (ref 27–31)
MCHC RBC AUTO-ENTMCNC: 34.8 GM/DL (ref 33–37)
MCV RBC AUTO: 96.6 FL (ref 80–94)
PDW BLD-RTO: 14.5 % (ref 11.5–14.5)
PLATELET # BLD: 127 THOU/MM3 (ref 130–400)
PMV BLD AUTO: 7.6 FL (ref 7.4–10.4)
POTASSIUM SERPL-SCNC: 4.7 MEQ/L (ref 3.5–5.2)
RBC # BLD: 2.93 MILL/MM3 (ref 4.7–6.1)
SODIUM BLD-SCNC: 140 MEQ/L (ref 135–145)
WBC # BLD: 3.3 THOU/MM3 (ref 4.8–10.8)

## 2018-03-10 PROCEDURE — 6370000000 HC RX 637 (ALT 250 FOR IP): Performed by: FAMILY MEDICINE

## 2018-03-10 PROCEDURE — 85610 PROTHROMBIN TIME: CPT

## 2018-03-10 PROCEDURE — 85027 COMPLETE CBC AUTOMATED: CPT

## 2018-03-10 PROCEDURE — 99239 HOSP IP/OBS DSCHRG MGMT >30: CPT | Performed by: HOSPITALIST

## 2018-03-10 PROCEDURE — 6370000000 HC RX 637 (ALT 250 FOR IP): Performed by: HOSPITALIST

## 2018-03-10 PROCEDURE — 90935 HEMODIALYSIS ONE EVALUATION: CPT

## 2018-03-10 PROCEDURE — 6360000002 HC RX W HCPCS: Performed by: FAMILY MEDICINE

## 2018-03-10 PROCEDURE — 80048 BASIC METABOLIC PNL TOTAL CA: CPT

## 2018-03-10 PROCEDURE — 6370000000 HC RX 637 (ALT 250 FOR IP): Performed by: INTERNAL MEDICINE

## 2018-03-10 PROCEDURE — 36415 COLL VENOUS BLD VENIPUNCTURE: CPT

## 2018-03-10 PROCEDURE — 99232 SBSQ HOSP IP/OBS MODERATE 35: CPT | Performed by: INTERNAL MEDICINE

## 2018-03-10 PROCEDURE — 2580000003 HC RX 258: Performed by: FAMILY MEDICINE

## 2018-03-10 PROCEDURE — 1240000000 HC EMOTIONAL WELLNESS R&B

## 2018-03-10 PROCEDURE — 6370000000 HC RX 637 (ALT 250 FOR IP): Performed by: PSYCHIATRY & NEUROLOGY

## 2018-03-10 RX ORDER — VERAPAMIL HYDROCHLORIDE 240 MG/1
240 TABLET, FILM COATED, EXTENDED RELEASE ORAL DAILY
Status: CANCELLED | OUTPATIENT
Start: 2018-03-11

## 2018-03-10 RX ORDER — MINOXIDIL 2.5 MG/1
15 TABLET ORAL EVERY 12 HOURS
Status: DISCONTINUED | OUTPATIENT
Start: 2018-03-10 | End: 2018-03-17 | Stop reason: HOSPADM

## 2018-03-10 RX ORDER — SEVELAMER CARBONATE 800 MG/1
1600 TABLET, FILM COATED ORAL
Status: CANCELLED | OUTPATIENT
Start: 2018-03-10

## 2018-03-10 RX ORDER — NICOTINE 21 MG/24HR
1 PATCH, TRANSDERMAL 24 HOURS TRANSDERMAL DAILY
Status: DISCONTINUED | OUTPATIENT
Start: 2018-03-11 | End: 2018-03-14

## 2018-03-10 RX ORDER — ACETAMINOPHEN 325 MG/1
650 TABLET ORAL EVERY 4 HOURS PRN
Status: DISCONTINUED | OUTPATIENT
Start: 2018-03-10 | End: 2018-03-17 | Stop reason: HOSPADM

## 2018-03-10 RX ORDER — CLONIDINE HYDROCHLORIDE 0.2 MG/1
0.2 TABLET ORAL 2 TIMES DAILY
Status: CANCELLED | OUTPATIENT
Start: 2018-03-10

## 2018-03-10 RX ORDER — MAGNESIUM HYDROXIDE/ALUMINUM HYDROXICE/SIMETHICONE 120; 1200; 1200 MG/30ML; MG/30ML; MG/30ML
30 SUSPENSION ORAL EVERY 6 HOURS PRN
Status: DISCONTINUED | OUTPATIENT
Start: 2018-03-10 | End: 2018-03-17 | Stop reason: HOSPADM

## 2018-03-10 RX ORDER — VERAPAMIL HYDROCHLORIDE 240 MG/1
240 TABLET, FILM COATED, EXTENDED RELEASE ORAL DAILY
Status: DISCONTINUED | OUTPATIENT
Start: 2018-03-11 | End: 2018-03-17 | Stop reason: HOSPADM

## 2018-03-10 RX ORDER — CLONIDINE HYDROCHLORIDE 0.2 MG/1
0.2 TABLET ORAL 2 TIMES DAILY
Status: DISCONTINUED | OUTPATIENT
Start: 2018-03-10 | End: 2018-03-14

## 2018-03-10 RX ORDER — DOXAZOSIN MESYLATE 4 MG/1
8 TABLET ORAL NIGHTLY
Status: CANCELLED | OUTPATIENT
Start: 2018-03-10

## 2018-03-10 RX ORDER — ISOSORBIDE MONONITRATE 60 MG/1
120 TABLET, EXTENDED RELEASE ORAL DAILY
Status: DISCONTINUED | OUTPATIENT
Start: 2018-03-11 | End: 2018-03-17 | Stop reason: HOSPADM

## 2018-03-10 RX ORDER — SEVELAMER CARBONATE 800 MG/1
1600 TABLET, FILM COATED ORAL
Status: DISCONTINUED | OUTPATIENT
Start: 2018-03-11 | End: 2018-03-17 | Stop reason: HOSPADM

## 2018-03-10 RX ORDER — FAMOTIDINE 20 MG/1
20 TABLET, FILM COATED ORAL DAILY
Status: DISCONTINUED | OUTPATIENT
Start: 2018-03-11 | End: 2018-03-17 | Stop reason: HOSPADM

## 2018-03-10 RX ORDER — ACETAMINOPHEN 325 MG/1
650 TABLET ORAL EVERY 4 HOURS PRN
Status: DISCONTINUED | OUTPATIENT
Start: 2018-03-10 | End: 2018-03-10 | Stop reason: SDUPTHER

## 2018-03-10 RX ORDER — M-VIT,TX,IRON,MINS/CALC/FOLIC 27MG-0.4MG
1 TABLET ORAL DAILY
Status: CANCELLED | OUTPATIENT
Start: 2018-03-11

## 2018-03-10 RX ORDER — NICOTINE 21 MG/24HR
1 PATCH, TRANSDERMAL 24 HOURS TRANSDERMAL DAILY
Status: CANCELLED | OUTPATIENT
Start: 2018-03-11

## 2018-03-10 RX ORDER — ACETAMINOPHEN 325 MG/1
650 TABLET ORAL EVERY 4 HOURS PRN
Status: CANCELLED | OUTPATIENT
Start: 2018-03-10

## 2018-03-10 RX ORDER — FLUTICASONE PROPIONATE 50 MCG
1 SPRAY, SUSPENSION (ML) NASAL 2 TIMES DAILY
Status: DISCONTINUED | OUTPATIENT
Start: 2018-03-10 | End: 2018-03-17 | Stop reason: HOSPADM

## 2018-03-10 RX ORDER — HYDROXYZINE PAMOATE 50 MG/1
50 CAPSULE ORAL 3 TIMES DAILY PRN
Status: DISCONTINUED | OUTPATIENT
Start: 2018-03-10 | End: 2018-03-17 | Stop reason: HOSPADM

## 2018-03-10 RX ORDER — MINOXIDIL 2.5 MG/1
15 TABLET ORAL EVERY 12 HOURS
Status: CANCELLED | OUTPATIENT
Start: 2018-03-10

## 2018-03-10 RX ORDER — SERTRALINE HYDROCHLORIDE 100 MG/1
100 TABLET, FILM COATED ORAL DAILY
Status: DISCONTINUED | OUTPATIENT
Start: 2018-03-11 | End: 2018-03-17 | Stop reason: HOSPADM

## 2018-03-10 RX ORDER — FAMOTIDINE 20 MG/1
20 TABLET, FILM COATED ORAL DAILY
Status: CANCELLED | OUTPATIENT
Start: 2018-03-11

## 2018-03-10 RX ORDER — M-VIT,TX,IRON,MINS/CALC/FOLIC 27MG-0.4MG
1 TABLET ORAL DAILY
Status: DISCONTINUED | OUTPATIENT
Start: 2018-03-11 | End: 2018-03-17 | Stop reason: HOSPADM

## 2018-03-10 RX ORDER — SERTRALINE HYDROCHLORIDE 100 MG/1
100 TABLET, FILM COATED ORAL DAILY
Status: CANCELLED | OUTPATIENT
Start: 2018-03-11

## 2018-03-10 RX ORDER — TRAZODONE HYDROCHLORIDE 50 MG/1
50 TABLET ORAL NIGHTLY PRN
Status: DISCONTINUED | OUTPATIENT
Start: 2018-03-10 | End: 2018-03-17 | Stop reason: HOSPADM

## 2018-03-10 RX ORDER — ISOSORBIDE MONONITRATE 60 MG/1
120 TABLET, EXTENDED RELEASE ORAL DAILY
Status: CANCELLED | OUTPATIENT
Start: 2018-03-11

## 2018-03-10 RX ORDER — MINOXIDIL 2.5 MG/1
15 TABLET ORAL EVERY 12 HOURS
Status: DISCONTINUED | OUTPATIENT
Start: 2018-03-10 | End: 2018-03-10 | Stop reason: HOSPADM

## 2018-03-10 RX ORDER — FLUTICASONE PROPIONATE 50 MCG
1 SPRAY, SUSPENSION (ML) NASAL 2 TIMES DAILY
Status: CANCELLED | OUTPATIENT
Start: 2018-03-10

## 2018-03-10 RX ORDER — DOXAZOSIN MESYLATE 4 MG/1
8 TABLET ORAL NIGHTLY
Status: DISCONTINUED | OUTPATIENT
Start: 2018-03-10 | End: 2018-03-16

## 2018-03-10 RX ADMIN — CLONIDINE HYDROCHLORIDE 0.2 MG: 0.2 TABLET ORAL at 09:44

## 2018-03-10 RX ADMIN — CEFTRIAXONE 1 G: 1 INJECTION, POWDER, FOR SOLUTION INTRAMUSCULAR; INTRAVENOUS at 05:56

## 2018-03-10 RX ADMIN — SERTRALINE 100 MG: 100 TABLET, FILM COATED ORAL at 12:03

## 2018-03-10 RX ADMIN — DOXAZOSIN 8 MG: 4 TABLET ORAL at 20:56

## 2018-03-10 RX ADMIN — MINOXIDIL 15 MG: 2.5 TABLET ORAL at 20:56

## 2018-03-10 RX ADMIN — VITAMIN D, TAB 1000IU (100/BT) 2000 UNITS: 25 TAB at 20:56

## 2018-03-10 RX ADMIN — CLONIDINE HYDROCHLORIDE 0.2 MG: 0.2 TABLET ORAL at 20:56

## 2018-03-10 RX ADMIN — Medication 10 ML: at 12:04

## 2018-03-10 RX ADMIN — MINOXIDIL 15 MG: 2.5 TABLET ORAL at 09:43

## 2018-03-10 RX ADMIN — SEVELAMER CARBONATE 1600 MG: 800 TABLET, FILM COATED ORAL at 12:02

## 2018-03-10 RX ADMIN — MULTIPLE VITAMINS W/ MINERALS TAB 1 TABLET: TAB at 12:03

## 2018-03-10 RX ADMIN — VERAPAMIL HYDROCHLORIDE 240 MG: 240 TABLET, FILM COATED, EXTENDED RELEASE ORAL at 09:44

## 2018-03-10 RX ADMIN — FLUTICASONE PROPIONATE 1 SPRAY: 50 SPRAY, METERED NASAL at 21:02

## 2018-03-10 RX ADMIN — SEVELAMER CARBONATE 1600 MG: 800 TABLET, FILM COATED ORAL at 16:15

## 2018-03-10 RX ADMIN — ISOSORBIDE MONONITRATE 120 MG: 60 TABLET ORAL at 09:44

## 2018-03-10 RX ADMIN — VITAMIN D, TAB 1000IU (100/BT) 2000 UNITS: 25 TAB at 12:03

## 2018-03-10 RX ADMIN — FAMOTIDINE 20 MG: 20 TABLET, FILM COATED ORAL at 12:04

## 2018-03-10 RX ADMIN — HEPARIN SODIUM 5000 UNITS: 5000 INJECTION, SOLUTION INTRAVENOUS; SUBCUTANEOUS at 12:05

## 2018-03-10 RX ADMIN — FLUTICASONE PROPIONATE 1 SPRAY: 50 SPRAY, METERED NASAL at 12:04

## 2018-03-10 ASSESSMENT — SLEEP AND FATIGUE QUESTIONNAIRES
DIFFICULTY ARISING: NO
DIFFICULTY STAYING ASLEEP: YES
AVERAGE NUMBER OF SLEEP HOURS: 3
DO YOU HAVE DIFFICULTY SLEEPING: YES
DO YOU USE A SLEEP AID: NO
DIFFICULTY FALLING ASLEEP: YES
RESTFUL SLEEP: NO
SLEEP PATTERN: DIFFICULTY FALLING ASLEEP;RESTLESSNESS;NIGHTMARES/TERRORS;DISTURBED/INTERRUPTED SLEEP

## 2018-03-10 ASSESSMENT — PATIENT HEALTH QUESTIONNAIRE - PHQ9: SUM OF ALL RESPONSES TO PHQ QUESTIONS 1-9: 7

## 2018-03-10 ASSESSMENT — LIFESTYLE VARIABLES: HISTORY_ALCOHOL_USE: YES

## 2018-03-10 ASSESSMENT — PAIN SCALES - GENERAL
PAINLEVEL_OUTOF10: 0
PAINLEVEL_OUTOF10: 0

## 2018-03-11 PROBLEM — F14.20 COCAINE USE DISORDER, SEVERE, DEPENDENCE (HCC): Chronic | Status: ACTIVE | Noted: 2018-03-11

## 2018-03-11 PROBLEM — F10.20 ALCOHOL USE DISORDER, SEVERE, DEPENDENCE (HCC): Chronic | Status: ACTIVE | Noted: 2018-03-11

## 2018-03-11 LAB
ANISOCYTOSIS: ABNORMAL
BASOPHILS # BLD: 0.5 %
BASOPHILS ABSOLUTE: 0 THOU/MM3 (ref 0–0.1)
EOSINOPHIL # BLD: 4.9 %
EOSINOPHILS ABSOLUTE: 0.2 THOU/MM3 (ref 0–0.4)
HCT VFR BLD CALC: 29.3 % (ref 42–52)
HEMOGLOBIN: 10 GM/DL (ref 14–18)
LYMPHOCYTES # BLD: 12.4 %
LYMPHOCYTES ABSOLUTE: 0.5 THOU/MM3 (ref 1–4.8)
MCH RBC QN AUTO: 33.1 PG (ref 27–31)
MCHC RBC AUTO-ENTMCNC: 34.3 GM/DL (ref 33–37)
MCV RBC AUTO: 96.6 FL (ref 80–94)
MONOCYTES # BLD: 11.5 %
MONOCYTES ABSOLUTE: 0.5 THOU/MM3 (ref 0.4–1.3)
NUCLEATED RED BLOOD CELLS: 0 /100 WBC
PDW BLD-RTO: 14.6 % (ref 11.5–14.5)
PLATELET # BLD: 134 THOU/MM3 (ref 130–400)
PMV BLD AUTO: 7.2 FL (ref 7.4–10.4)
RBC # BLD: 3.03 MILL/MM3 (ref 4.7–6.1)
SEG NEUTROPHILS: 70.7 %
SEGMENTED NEUTROPHILS ABSOLUTE COUNT: 3.1 THOU/MM3 (ref 1.8–7.7)
T4 FREE: 1.31 NG/DL (ref 0.93–1.76)
TSH SERPL DL<=0.05 MIU/L-ACNC: 1.57 UIU/ML (ref 0.4–4.2)
WBC # BLD: 4.4 THOU/MM3 (ref 4.8–10.8)

## 2018-03-11 PROCEDURE — 36415 COLL VENOUS BLD VENIPUNCTURE: CPT

## 2018-03-11 PROCEDURE — 85025 COMPLETE CBC W/AUTO DIFF WBC: CPT

## 2018-03-11 PROCEDURE — 6370000000 HC RX 637 (ALT 250 FOR IP): Performed by: HOSPITALIST

## 2018-03-11 PROCEDURE — 84443 ASSAY THYROID STIM HORMONE: CPT

## 2018-03-11 PROCEDURE — 84439 ASSAY OF FREE THYROXINE: CPT

## 2018-03-11 PROCEDURE — 1240000000 HC EMOTIONAL WELLNESS R&B

## 2018-03-11 RX ADMIN — CLONIDINE HYDROCHLORIDE 0.2 MG: 0.2 TABLET ORAL at 08:38

## 2018-03-11 RX ADMIN — MINOXIDIL 15 MG: 2.5 TABLET ORAL at 20:56

## 2018-03-11 RX ADMIN — ISOSORBIDE MONONITRATE 120 MG: 60 TABLET ORAL at 08:38

## 2018-03-11 RX ADMIN — SEVELAMER CARBONATE 1600 MG: 800 TABLET, FILM COATED ORAL at 11:53

## 2018-03-11 RX ADMIN — MINOXIDIL 15 MG: 2.5 TABLET ORAL at 08:38

## 2018-03-11 RX ADMIN — FLUTICASONE PROPIONATE 1 SPRAY: 50 SPRAY, METERED NASAL at 08:44

## 2018-03-11 RX ADMIN — VITAMIN D, TAB 1000IU (100/BT) 2000 UNITS: 25 TAB at 08:38

## 2018-03-11 RX ADMIN — SEVELAMER CARBONATE 1600 MG: 800 TABLET, FILM COATED ORAL at 08:14

## 2018-03-11 RX ADMIN — VERAPAMIL HYDROCHLORIDE 240 MG: 240 TABLET, FILM COATED, EXTENDED RELEASE ORAL at 08:38

## 2018-03-11 RX ADMIN — FLUTICASONE PROPIONATE 1 SPRAY: 50 SPRAY, METERED NASAL at 20:54

## 2018-03-11 RX ADMIN — DOXAZOSIN 8 MG: 4 TABLET ORAL at 20:55

## 2018-03-11 RX ADMIN — SERTRALINE 100 MG: 100 TABLET, FILM COATED ORAL at 08:38

## 2018-03-11 RX ADMIN — MULTIPLE VITAMINS W/ MINERALS TAB 1 TABLET: TAB at 08:38

## 2018-03-11 RX ADMIN — CLONIDINE HYDROCHLORIDE 0.2 MG: 0.2 TABLET ORAL at 20:54

## 2018-03-11 RX ADMIN — FAMOTIDINE 20 MG: 20 TABLET, FILM COATED ORAL at 08:38

## 2018-03-11 RX ADMIN — VITAMIN D, TAB 1000IU (100/BT) 2000 UNITS: 25 TAB at 21:59

## 2018-03-11 ASSESSMENT — PAIN SCALES - GENERAL
PAINLEVEL_OUTOF10: 0

## 2018-03-12 PROCEDURE — 6370000000 HC RX 637 (ALT 250 FOR IP): Performed by: PSYCHIATRY & NEUROLOGY

## 2018-03-12 PROCEDURE — 90935 HEMODIALYSIS ONE EVALUATION: CPT | Performed by: NURSE PRACTITIONER

## 2018-03-12 PROCEDURE — 90935 HEMODIALYSIS ONE EVALUATION: CPT

## 2018-03-12 PROCEDURE — 6370000000 HC RX 637 (ALT 250 FOR IP): Performed by: HOSPITALIST

## 2018-03-12 PROCEDURE — 1240000000 HC EMOTIONAL WELLNESS R&B

## 2018-03-12 RX ADMIN — MINOXIDIL 15 MG: 2.5 TABLET ORAL at 08:12

## 2018-03-12 RX ADMIN — CLONIDINE HYDROCHLORIDE 0.2 MG: 0.2 TABLET ORAL at 21:09

## 2018-03-12 RX ADMIN — FAMOTIDINE 20 MG: 20 TABLET, FILM COATED ORAL at 08:13

## 2018-03-12 RX ADMIN — TRAZODONE HYDROCHLORIDE 50 MG: 50 TABLET ORAL at 21:08

## 2018-03-12 RX ADMIN — VITAMIN D, TAB 1000IU (100/BT) 2000 UNITS: 25 TAB at 21:09

## 2018-03-12 RX ADMIN — SEVELAMER CARBONATE 1600 MG: 800 TABLET, FILM COATED ORAL at 17:39

## 2018-03-12 RX ADMIN — SEVELAMER CARBONATE 1600 MG: 800 TABLET, FILM COATED ORAL at 11:00

## 2018-03-12 RX ADMIN — FLUTICASONE PROPIONATE 1 SPRAY: 50 SPRAY, METERED NASAL at 09:24

## 2018-03-12 RX ADMIN — MULTIPLE VITAMINS W/ MINERALS TAB 1 TABLET: TAB at 08:14

## 2018-03-12 RX ADMIN — VERAPAMIL HYDROCHLORIDE 240 MG: 240 TABLET, FILM COATED, EXTENDED RELEASE ORAL at 08:13

## 2018-03-12 RX ADMIN — DOXAZOSIN 8 MG: 4 TABLET ORAL at 21:08

## 2018-03-12 RX ADMIN — VITAMIN D, TAB 1000IU (100/BT) 2000 UNITS: 25 TAB at 08:14

## 2018-03-12 RX ADMIN — ISOSORBIDE MONONITRATE 120 MG: 60 TABLET ORAL at 08:13

## 2018-03-12 RX ADMIN — FLUTICASONE PROPIONATE 1 SPRAY: 50 SPRAY, METERED NASAL at 21:09

## 2018-03-12 RX ADMIN — SERTRALINE 100 MG: 100 TABLET, FILM COATED ORAL at 08:14

## 2018-03-12 RX ADMIN — SEVELAMER CARBONATE 1600 MG: 800 TABLET, FILM COATED ORAL at 08:13

## 2018-03-12 RX ADMIN — MINOXIDIL 15 MG: 2.5 TABLET ORAL at 21:09

## 2018-03-12 RX ADMIN — CLONIDINE HYDROCHLORIDE 0.2 MG: 0.2 TABLET ORAL at 08:13

## 2018-03-13 PROCEDURE — 6370000000 HC RX 637 (ALT 250 FOR IP): Performed by: HOSPITALIST

## 2018-03-13 PROCEDURE — 1240000000 HC EMOTIONAL WELLNESS R&B

## 2018-03-13 PROCEDURE — 6370000000 HC RX 637 (ALT 250 FOR IP): Performed by: PSYCHIATRY & NEUROLOGY

## 2018-03-13 RX ADMIN — CLONIDINE HYDROCHLORIDE 0.2 MG: 0.2 TABLET ORAL at 21:07

## 2018-03-13 RX ADMIN — SERTRALINE 100 MG: 100 TABLET, FILM COATED ORAL at 07:54

## 2018-03-13 RX ADMIN — VERAPAMIL HYDROCHLORIDE 240 MG: 240 TABLET, FILM COATED, EXTENDED RELEASE ORAL at 07:53

## 2018-03-13 RX ADMIN — ISOSORBIDE MONONITRATE 120 MG: 60 TABLET ORAL at 07:54

## 2018-03-13 RX ADMIN — SEVELAMER CARBONATE 1600 MG: 800 TABLET, FILM COATED ORAL at 16:47

## 2018-03-13 RX ADMIN — FLUTICASONE PROPIONATE 1 SPRAY: 50 SPRAY, METERED NASAL at 22:00

## 2018-03-13 RX ADMIN — SEVELAMER CARBONATE 1600 MG: 800 TABLET, FILM COATED ORAL at 07:54

## 2018-03-13 RX ADMIN — DOXAZOSIN 8 MG: 4 TABLET ORAL at 21:07

## 2018-03-13 RX ADMIN — CLONIDINE HYDROCHLORIDE 0.2 MG: 0.2 TABLET ORAL at 07:54

## 2018-03-13 RX ADMIN — VITAMIN D, TAB 1000IU (100/BT) 2000 UNITS: 25 TAB at 21:06

## 2018-03-13 RX ADMIN — FAMOTIDINE 20 MG: 20 TABLET, FILM COATED ORAL at 07:58

## 2018-03-13 RX ADMIN — TRAZODONE HYDROCHLORIDE 50 MG: 50 TABLET ORAL at 21:07

## 2018-03-13 RX ADMIN — MINOXIDIL 15 MG: 2.5 TABLET ORAL at 07:54

## 2018-03-13 RX ADMIN — MULTIPLE VITAMINS W/ MINERALS TAB 1 TABLET: TAB at 07:54

## 2018-03-13 RX ADMIN — VITAMIN D, TAB 1000IU (100/BT) 2000 UNITS: 25 TAB at 07:53

## 2018-03-13 RX ADMIN — SEVELAMER CARBONATE 1600 MG: 800 TABLET, FILM COATED ORAL at 12:00

## 2018-03-13 RX ADMIN — MINOXIDIL 15 MG: 2.5 TABLET ORAL at 21:06

## 2018-03-13 RX ADMIN — FLUTICASONE PROPIONATE 1 SPRAY: 50 SPRAY, METERED NASAL at 07:58

## 2018-03-13 ASSESSMENT — PAIN SCALES - GENERAL: PAINLEVEL_OUTOF10: 0

## 2018-03-14 LAB
ANION GAP SERPL CALCULATED.3IONS-SCNC: 15 MEQ/L (ref 8–16)
BUN BLDV-MCNC: 59 MG/DL (ref 7–22)
CALCIUM SERPL-MCNC: 9.3 MG/DL (ref 8.5–10.5)
CHLORIDE BLD-SCNC: 98 MEQ/L (ref 98–111)
CO2: 27 MEQ/L (ref 23–33)
CREAT SERPL-MCNC: 7.1 MG/DL (ref 0.4–1.2)
GFR SERPL CREATININE-BSD FRML MDRD: 10 ML/MIN/1.73M2
GLUCOSE BLD-MCNC: 97 MG/DL (ref 70–108)
POTASSIUM REFLEX MAGNESIUM: 6.5 MEQ/L (ref 3.5–5.2)
SODIUM BLD-SCNC: 140 MEQ/L (ref 135–145)

## 2018-03-14 PROCEDURE — 36415 COLL VENOUS BLD VENIPUNCTURE: CPT

## 2018-03-14 PROCEDURE — 99232 SBSQ HOSP IP/OBS MODERATE 35: CPT | Performed by: INTERNAL MEDICINE

## 2018-03-14 PROCEDURE — 6370000000 HC RX 637 (ALT 250 FOR IP): Performed by: HOSPITALIST

## 2018-03-14 PROCEDURE — 90935 HEMODIALYSIS ONE EVALUATION: CPT

## 2018-03-14 PROCEDURE — 80048 BASIC METABOLIC PNL TOTAL CA: CPT

## 2018-03-14 PROCEDURE — 6370000000 HC RX 637 (ALT 250 FOR IP): Performed by: INTERNAL MEDICINE

## 2018-03-14 PROCEDURE — 1240000000 HC EMOTIONAL WELLNESS R&B

## 2018-03-14 RX ADMIN — CLONIDINE HYDROCHLORIDE 0.3 MG: 0.2 TABLET ORAL at 15:26

## 2018-03-14 RX ADMIN — SEVELAMER CARBONATE 1600 MG: 800 TABLET, FILM COATED ORAL at 17:02

## 2018-03-14 RX ADMIN — MINOXIDIL 15 MG: 2.5 TABLET ORAL at 07:24

## 2018-03-14 RX ADMIN — FLUTICASONE PROPIONATE 1 SPRAY: 50 SPRAY, METERED NASAL at 20:58

## 2018-03-14 RX ADMIN — MULTIPLE VITAMINS W/ MINERALS TAB 1 TABLET: TAB at 07:23

## 2018-03-14 RX ADMIN — FLUTICASONE PROPIONATE 1 SPRAY: 50 SPRAY, METERED NASAL at 07:25

## 2018-03-14 RX ADMIN — SEVELAMER CARBONATE 1600 MG: 800 TABLET, FILM COATED ORAL at 14:00

## 2018-03-14 RX ADMIN — MINOXIDIL 15 MG: 2.5 TABLET ORAL at 21:01

## 2018-03-14 RX ADMIN — VITAMIN D, TAB 1000IU (100/BT) 2000 UNITS: 25 TAB at 20:59

## 2018-03-14 RX ADMIN — FAMOTIDINE 20 MG: 20 TABLET, FILM COATED ORAL at 07:23

## 2018-03-14 RX ADMIN — ISOSORBIDE MONONITRATE 120 MG: 60 TABLET ORAL at 07:42

## 2018-03-14 RX ADMIN — SEVELAMER CARBONATE 1600 MG: 800 TABLET, FILM COATED ORAL at 07:23

## 2018-03-14 RX ADMIN — DOXAZOSIN 8 MG: 4 TABLET ORAL at 20:59

## 2018-03-14 RX ADMIN — SERTRALINE 100 MG: 100 TABLET, FILM COATED ORAL at 07:23

## 2018-03-14 RX ADMIN — VERAPAMIL HYDROCHLORIDE 240 MG: 240 TABLET, FILM COATED, EXTENDED RELEASE ORAL at 07:23

## 2018-03-14 RX ADMIN — VITAMIN D, TAB 1000IU (100/BT) 2000 UNITS: 25 TAB at 07:23

## 2018-03-14 RX ADMIN — CLONIDINE HYDROCHLORIDE 0.3 MG: 0.2 TABLET ORAL at 07:14

## 2018-03-14 RX ADMIN — CLONIDINE HYDROCHLORIDE 0.3 MG: 0.2 TABLET ORAL at 20:59

## 2018-03-14 ASSESSMENT — PAIN SCALES - GENERAL
PAINLEVEL_OUTOF10: 0
PAINLEVEL_OUTOF10: 0

## 2018-03-15 PROCEDURE — 1240000000 HC EMOTIONAL WELLNESS R&B

## 2018-03-15 PROCEDURE — 6370000000 HC RX 637 (ALT 250 FOR IP): Performed by: PSYCHIATRY & NEUROLOGY

## 2018-03-15 PROCEDURE — 6370000000 HC RX 637 (ALT 250 FOR IP): Performed by: HOSPITALIST

## 2018-03-15 PROCEDURE — 99231 SBSQ HOSP IP/OBS SF/LOW 25: CPT | Performed by: INTERNAL MEDICINE

## 2018-03-15 PROCEDURE — 6370000000 HC RX 637 (ALT 250 FOR IP): Performed by: INTERNAL MEDICINE

## 2018-03-15 RX ADMIN — TRAZODONE HYDROCHLORIDE 50 MG: 50 TABLET ORAL at 20:43

## 2018-03-15 RX ADMIN — FAMOTIDINE 20 MG: 20 TABLET, FILM COATED ORAL at 08:11

## 2018-03-15 RX ADMIN — FLUTICASONE PROPIONATE 1 SPRAY: 50 SPRAY, METERED NASAL at 20:43

## 2018-03-15 RX ADMIN — FLUTICASONE PROPIONATE 1 SPRAY: 50 SPRAY, METERED NASAL at 08:11

## 2018-03-15 RX ADMIN — VITAMIN D, TAB 1000IU (100/BT) 2000 UNITS: 25 TAB at 20:43

## 2018-03-15 RX ADMIN — ISOSORBIDE MONONITRATE 120 MG: 60 TABLET ORAL at 08:11

## 2018-03-15 RX ADMIN — VERAPAMIL HYDROCHLORIDE 240 MG: 240 TABLET, FILM COATED, EXTENDED RELEASE ORAL at 08:11

## 2018-03-15 RX ADMIN — VITAMIN D, TAB 1000IU (100/BT) 2000 UNITS: 25 TAB at 08:11

## 2018-03-15 RX ADMIN — DOXAZOSIN 8 MG: 4 TABLET ORAL at 20:43

## 2018-03-15 RX ADMIN — SEVELAMER CARBONATE 1600 MG: 800 TABLET, FILM COATED ORAL at 08:10

## 2018-03-15 RX ADMIN — MULTIPLE VITAMINS W/ MINERALS TAB 1 TABLET: TAB at 08:11

## 2018-03-15 RX ADMIN — CLONIDINE HYDROCHLORIDE 0.3 MG: 0.2 TABLET ORAL at 20:43

## 2018-03-15 RX ADMIN — SEVELAMER CARBONATE 1600 MG: 800 TABLET, FILM COATED ORAL at 12:00

## 2018-03-15 RX ADMIN — CLONIDINE HYDROCHLORIDE 0.3 MG: 0.2 TABLET ORAL at 08:11

## 2018-03-15 RX ADMIN — SERTRALINE 100 MG: 100 TABLET, FILM COATED ORAL at 08:11

## 2018-03-15 RX ADMIN — SEVELAMER CARBONATE 1600 MG: 800 TABLET, FILM COATED ORAL at 17:15

## 2018-03-15 RX ADMIN — MINOXIDIL 15 MG: 2.5 TABLET ORAL at 20:43

## 2018-03-15 RX ADMIN — MINOXIDIL 15 MG: 2.5 TABLET ORAL at 08:10

## 2018-03-15 ASSESSMENT — PAIN SCALES - GENERAL
PAINLEVEL_OUTOF10: 0
PAINLEVEL_OUTOF10: 0

## 2018-03-16 LAB — POTASSIUM SERPL-SCNC: 6.9 MEQ/L (ref 3.5–5.2)

## 2018-03-16 PROCEDURE — 6370000000 HC RX 637 (ALT 250 FOR IP): Performed by: PSYCHIATRY & NEUROLOGY

## 2018-03-16 PROCEDURE — 90935 HEMODIALYSIS ONE EVALUATION: CPT

## 2018-03-16 PROCEDURE — 6370000000 HC RX 637 (ALT 250 FOR IP): Performed by: HOSPITALIST

## 2018-03-16 PROCEDURE — 90935 HEMODIALYSIS ONE EVALUATION: CPT | Performed by: INTERNAL MEDICINE

## 2018-03-16 PROCEDURE — 84132 ASSAY OF SERUM POTASSIUM: CPT

## 2018-03-16 PROCEDURE — 6370000000 HC RX 637 (ALT 250 FOR IP): Performed by: INTERNAL MEDICINE

## 2018-03-16 PROCEDURE — 1240000000 HC EMOTIONAL WELLNESS R&B

## 2018-03-16 PROCEDURE — 36415 COLL VENOUS BLD VENIPUNCTURE: CPT

## 2018-03-16 RX ORDER — DOXAZOSIN MESYLATE 4 MG/1
8 TABLET ORAL EVERY 12 HOURS SCHEDULED
Status: DISCONTINUED | OUTPATIENT
Start: 2018-03-16 | End: 2018-03-17 | Stop reason: HOSPADM

## 2018-03-16 RX ADMIN — CLONIDINE HYDROCHLORIDE 0.3 MG: 0.2 TABLET ORAL at 12:30

## 2018-03-16 RX ADMIN — VERAPAMIL HYDROCHLORIDE 240 MG: 240 TABLET, FILM COATED, EXTENDED RELEASE ORAL at 07:10

## 2018-03-16 RX ADMIN — SERTRALINE 100 MG: 100 TABLET, FILM COATED ORAL at 07:10

## 2018-03-16 RX ADMIN — MINOXIDIL 15 MG: 2.5 TABLET ORAL at 07:11

## 2018-03-16 RX ADMIN — VITAMIN D, TAB 1000IU (100/BT) 2000 UNITS: 25 TAB at 21:02

## 2018-03-16 RX ADMIN — FAMOTIDINE 20 MG: 20 TABLET, FILM COATED ORAL at 07:11

## 2018-03-16 RX ADMIN — ISOSORBIDE MONONITRATE 120 MG: 60 TABLET ORAL at 07:10

## 2018-03-16 RX ADMIN — MINOXIDIL 15 MG: 2.5 TABLET ORAL at 21:02

## 2018-03-16 RX ADMIN — CLONIDINE HYDROCHLORIDE 0.3 MG: 0.2 TABLET ORAL at 07:11

## 2018-03-16 RX ADMIN — DOXAZOSIN 8 MG: 4 TABLET ORAL at 21:02

## 2018-03-16 RX ADMIN — SEVELAMER CARBONATE 1600 MG: 800 TABLET, FILM COATED ORAL at 12:30

## 2018-03-16 RX ADMIN — FLUTICASONE PROPIONATE 1 SPRAY: 50 SPRAY, METERED NASAL at 07:16

## 2018-03-16 RX ADMIN — DOXAZOSIN 8 MG: 4 TABLET ORAL at 12:30

## 2018-03-16 RX ADMIN — MULTIPLE VITAMINS W/ MINERALS TAB 1 TABLET: TAB at 07:10

## 2018-03-16 RX ADMIN — SEVELAMER CARBONATE 1600 MG: 800 TABLET, FILM COATED ORAL at 07:11

## 2018-03-16 RX ADMIN — VITAMIN D, TAB 1000IU (100/BT) 2000 UNITS: 25 TAB at 07:10

## 2018-03-16 RX ADMIN — SEVELAMER CARBONATE 1600 MG: 800 TABLET, FILM COATED ORAL at 16:47

## 2018-03-16 RX ADMIN — FLUTICASONE PROPIONATE 1 SPRAY: 50 SPRAY, METERED NASAL at 21:03

## 2018-03-16 RX ADMIN — TRAZODONE HYDROCHLORIDE 50 MG: 50 TABLET ORAL at 21:02

## 2018-03-16 ASSESSMENT — PAIN SCALES - GENERAL: PAINLEVEL_OUTOF10: 0

## 2018-03-17 ENCOUNTER — APPOINTMENT (OUTPATIENT)
Dept: GENERAL RADIOLOGY | Age: 53
DRG: 252 | End: 2018-03-17
Attending: HOSPITALIST
Payer: MEDICARE

## 2018-03-17 ENCOUNTER — HOSPITAL ENCOUNTER (INPATIENT)
Age: 53
LOS: 10 days | Discharge: HOME | DRG: 252 | End: 2018-03-27
Attending: HOSPITALIST | Admitting: HOSPITALIST
Payer: MEDICARE

## 2018-03-17 VITALS
SYSTOLIC BLOOD PRESSURE: 127 MMHG | RESPIRATION RATE: 17 BRPM | BODY MASS INDEX: 26.26 KG/M2 | WEIGHT: 211.2 LBS | HEIGHT: 75 IN | DIASTOLIC BLOOD PRESSURE: 60 MMHG | TEMPERATURE: 97.3 F | HEART RATE: 118 BPM | OXYGEN SATURATION: 96 %

## 2018-03-17 PROBLEM — R07.9 CHEST PAIN: Status: ACTIVE | Noted: 2018-03-17

## 2018-03-17 LAB
D-DIMER QUANTITATIVE: 4624 NG/ML FEU (ref 0–500)
EKG ATRIAL RATE: 115 BPM
EKG ATRIAL RATE: 224 BPM
EKG P AXIS: -90 DEGREES
EKG P-R INTERVAL: 160 MS
EKG Q-T INTERVAL: 346 MS
EKG Q-T INTERVAL: 356 MS
EKG QRS DURATION: 96 MS
EKG QRS DURATION: 96 MS
EKG QTC CALCULATION (BAZETT): 478 MS
EKG QTC CALCULATION (BAZETT): 485 MS
EKG R AXIS: -24 DEGREES
EKG R AXIS: -46 DEGREES
EKG T AXIS: 132 DEGREES
EKG T AXIS: 95 DEGREES
EKG VENTRICULAR RATE: 112 BPM
EKG VENTRICULAR RATE: 115 BPM
POTASSIUM SERPL-SCNC: 5.6 MEQ/L (ref 3.5–5.2)
POTASSIUM SERPL-SCNC: 6.1 MEQ/L (ref 3.5–5.2)
POTASSIUM SERPL-SCNC: 6.8 MEQ/L (ref 3.5–5.2)
TROPONIN T: 0.04 NG/ML

## 2018-03-17 PROCEDURE — 80048 BASIC METABOLIC PNL TOTAL CA: CPT

## 2018-03-17 PROCEDURE — 6370000000 HC RX 637 (ALT 250 FOR IP): Performed by: PSYCHIATRY & NEUROLOGY

## 2018-03-17 PROCEDURE — 84132 ASSAY OF SERUM POTASSIUM: CPT

## 2018-03-17 PROCEDURE — 6370000000 HC RX 637 (ALT 250 FOR IP): Performed by: HOSPITALIST

## 2018-03-17 PROCEDURE — 93005 ELECTROCARDIOGRAM TRACING: CPT | Performed by: PSYCHIATRY & NEUROLOGY

## 2018-03-17 PROCEDURE — 6370000000 HC RX 637 (ALT 250 FOR IP): Performed by: INTERNAL MEDICINE

## 2018-03-17 PROCEDURE — 99222 1ST HOSP IP/OBS MODERATE 55: CPT | Performed by: HOSPITALIST

## 2018-03-17 PROCEDURE — 93005 ELECTROCARDIOGRAM TRACING: CPT

## 2018-03-17 PROCEDURE — 2500000003 HC RX 250 WO HCPCS: Performed by: HOSPITALIST

## 2018-03-17 PROCEDURE — 2580000003 HC RX 258: Performed by: HOSPITALIST

## 2018-03-17 PROCEDURE — 93005 ELECTROCARDIOGRAM TRACING: CPT | Performed by: HOSPITALIST

## 2018-03-17 PROCEDURE — 2580000003 HC RX 258: Performed by: INTERNAL MEDICINE

## 2018-03-17 PROCEDURE — 99221 1ST HOSP IP/OBS SF/LOW 40: CPT | Performed by: INTERNAL MEDICINE

## 2018-03-17 PROCEDURE — 71046 X-RAY EXAM CHEST 2 VIEWS: CPT

## 2018-03-17 PROCEDURE — 84484 ASSAY OF TROPONIN QUANT: CPT

## 2018-03-17 PROCEDURE — 6360000002 HC RX W HCPCS: Performed by: HOSPITALIST

## 2018-03-17 PROCEDURE — 36415 COLL VENOUS BLD VENIPUNCTURE: CPT

## 2018-03-17 PROCEDURE — 93010 ELECTROCARDIOGRAM REPORT: CPT | Performed by: INTERNAL MEDICINE

## 2018-03-17 PROCEDURE — 85379 FIBRIN DEGRADATION QUANT: CPT

## 2018-03-17 PROCEDURE — 83735 ASSAY OF MAGNESIUM: CPT

## 2018-03-17 PROCEDURE — 1200000003 HC TELEMETRY R&B

## 2018-03-17 PROCEDURE — 99222 1ST HOSP IP/OBS MODERATE 55: CPT | Performed by: INTERNAL MEDICINE

## 2018-03-17 RX ORDER — ONDANSETRON 2 MG/ML
4 INJECTION INTRAMUSCULAR; INTRAVENOUS EVERY 6 HOURS PRN
Status: DISCONTINUED | OUTPATIENT
Start: 2018-03-17 | End: 2018-03-17

## 2018-03-17 RX ORDER — DEXTROSE MONOHYDRATE 25 G/50ML
25 INJECTION, SOLUTION INTRAVENOUS ONCE
Status: DISCONTINUED | OUTPATIENT
Start: 2018-03-17 | End: 2018-03-27 | Stop reason: HOSPADM

## 2018-03-17 RX ORDER — SODIUM POLYSTYRENE SULFONATE 15 G/60ML
30 SUSPENSION ORAL; RECTAL ONCE
Status: COMPLETED | OUTPATIENT
Start: 2018-03-17 | End: 2018-03-17

## 2018-03-17 RX ORDER — ACETAMINOPHEN 325 MG/1
650 TABLET ORAL EVERY 4 HOURS PRN
Status: DISCONTINUED | OUTPATIENT
Start: 2018-03-17 | End: 2018-03-17

## 2018-03-17 RX ORDER — DEXTROSE MONOHYDRATE 25 G/50ML
12.5 INJECTION, SOLUTION INTRAVENOUS PRN
Status: DISCONTINUED | OUTPATIENT
Start: 2018-03-17 | End: 2018-03-27 | Stop reason: HOSPADM

## 2018-03-17 RX ORDER — MAGNESIUM HYDROXIDE/ALUMINUM HYDROXICE/SIMETHICONE 120; 1200; 1200 MG/30ML; MG/30ML; MG/30ML
30 SUSPENSION ORAL EVERY 6 HOURS PRN
Status: DISCONTINUED | OUTPATIENT
Start: 2018-03-17 | End: 2018-03-27 | Stop reason: HOSPADM

## 2018-03-17 RX ORDER — M-VIT,TX,IRON,MINS/CALC/FOLIC 27MG-0.4MG
1 TABLET ORAL DAILY
Status: DISCONTINUED | OUTPATIENT
Start: 2018-03-18 | End: 2018-03-27 | Stop reason: HOSPADM

## 2018-03-17 RX ORDER — ACETAMINOPHEN 325 MG/1
650 TABLET ORAL EVERY 4 HOURS PRN
Status: DISCONTINUED | OUTPATIENT
Start: 2018-03-17 | End: 2018-03-27 | Stop reason: HOSPADM

## 2018-03-17 RX ORDER — ONDANSETRON 4 MG/1
4 TABLET, ORALLY DISINTEGRATING ORAL EVERY 6 HOURS PRN
Status: DISCONTINUED | OUTPATIENT
Start: 2018-03-17 | End: 2018-03-27 | Stop reason: HOSPADM

## 2018-03-17 RX ORDER — TRAZODONE HYDROCHLORIDE 50 MG/1
50 TABLET ORAL NIGHTLY PRN
Status: DISCONTINUED | OUTPATIENT
Start: 2018-03-17 | End: 2018-03-27 | Stop reason: HOSPADM

## 2018-03-17 RX ORDER — SEVELAMER CARBONATE 800 MG/1
1600 TABLET, FILM COATED ORAL
Status: DISCONTINUED | OUTPATIENT
Start: 2018-03-17 | End: 2018-03-27 | Stop reason: HOSPADM

## 2018-03-17 RX ORDER — ISOSORBIDE MONONITRATE 60 MG/1
120 TABLET, EXTENDED RELEASE ORAL DAILY
Status: DISCONTINUED | OUTPATIENT
Start: 2018-03-18 | End: 2018-03-27 | Stop reason: HOSPADM

## 2018-03-17 RX ORDER — SODIUM CHLORIDE 0.9 % (FLUSH) 0.9 %
10 SYRINGE (ML) INJECTION PRN
Status: DISCONTINUED | OUTPATIENT
Start: 2018-03-17 | End: 2018-03-27 | Stop reason: HOSPADM

## 2018-03-17 RX ORDER — SODIUM POLYSTYRENE SULFONATE 15 G/60ML
15 SUSPENSION ORAL; RECTAL ONCE
Status: COMPLETED | OUTPATIENT
Start: 2018-03-17 | End: 2018-03-17

## 2018-03-17 RX ORDER — VERAPAMIL HYDROCHLORIDE 240 MG/1
240 TABLET, FILM COATED, EXTENDED RELEASE ORAL DAILY
Status: DISCONTINUED | OUTPATIENT
Start: 2018-03-18 | End: 2018-03-27 | Stop reason: HOSPADM

## 2018-03-17 RX ORDER — METOPROLOL TARTRATE 5 MG/5ML
5 INJECTION INTRAVENOUS EVERY 6 HOURS
Status: DISCONTINUED | OUTPATIENT
Start: 2018-03-17 | End: 2018-03-18

## 2018-03-17 RX ORDER — SERTRALINE HYDROCHLORIDE 100 MG/1
100 TABLET, FILM COATED ORAL DAILY
Status: DISCONTINUED | OUTPATIENT
Start: 2018-03-18 | End: 2018-03-27 | Stop reason: HOSPADM

## 2018-03-17 RX ORDER — FAMOTIDINE 20 MG/1
20 TABLET, FILM COATED ORAL DAILY
Status: DISCONTINUED | OUTPATIENT
Start: 2018-03-18 | End: 2018-03-27 | Stop reason: HOSPADM

## 2018-03-17 RX ORDER — HEPARIN SODIUM 5000 [USP'U]/ML
5000 INJECTION, SOLUTION INTRAVENOUS; SUBCUTANEOUS EVERY 8 HOURS
Status: DISCONTINUED | OUTPATIENT
Start: 2018-03-17 | End: 2018-03-19

## 2018-03-17 RX ORDER — SODIUM CHLORIDE 0.9 % (FLUSH) 0.9 %
10 SYRINGE (ML) INJECTION EVERY 12 HOURS SCHEDULED
Status: DISCONTINUED | OUTPATIENT
Start: 2018-03-17 | End: 2018-03-27 | Stop reason: HOSPADM

## 2018-03-17 RX ORDER — NICOTINE POLACRILEX 4 MG
15 LOZENGE BUCCAL PRN
Status: DISCONTINUED | OUTPATIENT
Start: 2018-03-17 | End: 2018-03-27 | Stop reason: HOSPADM

## 2018-03-17 RX ORDER — MINOXIDIL 2.5 MG/1
15 TABLET ORAL EVERY 12 HOURS
Status: DISCONTINUED | OUTPATIENT
Start: 2018-03-17 | End: 2018-03-27 | Stop reason: HOSPADM

## 2018-03-17 RX ORDER — HYDROXYZINE PAMOATE 50 MG/1
50 CAPSULE ORAL 3 TIMES DAILY PRN
Status: DISCONTINUED | OUTPATIENT
Start: 2018-03-17 | End: 2018-03-27 | Stop reason: HOSPADM

## 2018-03-17 RX ORDER — DEXTROSE MONOHYDRATE 50 MG/ML
100 INJECTION, SOLUTION INTRAVENOUS PRN
Status: DISCONTINUED | OUTPATIENT
Start: 2018-03-17 | End: 2018-03-27 | Stop reason: HOSPADM

## 2018-03-17 RX ORDER — DOXAZOSIN MESYLATE 4 MG/1
8 TABLET ORAL EVERY 12 HOURS SCHEDULED
Status: DISCONTINUED | OUTPATIENT
Start: 2018-03-17 | End: 2018-03-27 | Stop reason: HOSPADM

## 2018-03-17 RX ORDER — FLUTICASONE PROPIONATE 50 MCG
1 SPRAY, SUSPENSION (ML) NASAL 2 TIMES DAILY
Status: DISCONTINUED | OUTPATIENT
Start: 2018-03-17 | End: 2018-03-27 | Stop reason: HOSPADM

## 2018-03-17 RX ADMIN — Medication 15 G: at 16:46

## 2018-03-17 RX ADMIN — HEPARIN SODIUM 5000 UNITS: 5000 INJECTION, SOLUTION INTRAVENOUS; SUBCUTANEOUS at 17:20

## 2018-03-17 RX ADMIN — SEVELAMER CARBONATE 1600 MG: 800 TABLET, FILM COATED ORAL at 11:44

## 2018-03-17 RX ADMIN — DOXAZOSIN 8 MG: 4 TABLET ORAL at 21:39

## 2018-03-17 RX ADMIN — MINOXIDIL 15 MG: 2.5 TABLET ORAL at 07:58

## 2018-03-17 RX ADMIN — SERTRALINE 100 MG: 100 TABLET, FILM COATED ORAL at 07:58

## 2018-03-17 RX ADMIN — VERAPAMIL HYDROCHLORIDE 240 MG: 240 TABLET, FILM COATED, EXTENDED RELEASE ORAL at 07:57

## 2018-03-17 RX ADMIN — VITAMIN D, TAB 1000IU (100/BT) 2000 UNITS: 25 TAB at 21:39

## 2018-03-17 RX ADMIN — SEVELAMER CARBONATE 1600 MG: 800 TABLET, FILM COATED ORAL at 07:58

## 2018-03-17 RX ADMIN — VITAMIN D, TAB 1000IU (100/BT) 2000 UNITS: 25 TAB at 07:57

## 2018-03-17 RX ADMIN — FAMOTIDINE 20 MG: 20 TABLET, FILM COATED ORAL at 07:57

## 2018-03-17 RX ADMIN — DOXAZOSIN 8 MG: 4 TABLET ORAL at 07:58

## 2018-03-17 RX ADMIN — ISOSORBIDE MONONITRATE 120 MG: 60 TABLET ORAL at 07:58

## 2018-03-17 RX ADMIN — SEVELAMER CARBONATE 1600 MG: 800 TABLET, FILM COATED ORAL at 17:20

## 2018-03-17 RX ADMIN — MINOXIDIL 15 MG: 2.5 TABLET ORAL at 21:38

## 2018-03-17 RX ADMIN — CLONIDINE HYDROCHLORIDE 0.3 MG: 0.2 TABLET ORAL at 21:39

## 2018-03-17 RX ADMIN — CLONIDINE HYDROCHLORIDE 0.3 MG: 0.2 TABLET ORAL at 07:58

## 2018-03-17 RX ADMIN — METOPROLOL TARTRATE 5 MG: 5 INJECTION INTRAVENOUS at 18:13

## 2018-03-17 RX ADMIN — Medication 10 ML: at 21:38

## 2018-03-17 RX ADMIN — FLUTICASONE PROPIONATE 1 SPRAY: 50 SPRAY, METERED NASAL at 21:38

## 2018-03-17 RX ADMIN — METOPROLOL TARTRATE 5 MG: 5 INJECTION INTRAVENOUS at 21:38

## 2018-03-17 RX ADMIN — HEPARIN SODIUM 5000 UNITS: 5000 INJECTION, SOLUTION INTRAVENOUS; SUBCUTANEOUS at 21:38

## 2018-03-17 RX ADMIN — ALUMINUM HYDROXIDE, MAGNESIUM HYDROXIDE, AND SIMETHICONE 30 ML: 200; 200; 20 SUSPENSION ORAL at 11:35

## 2018-03-17 RX ADMIN — HYDROXYZINE PAMOATE 50 MG: 50 CAPSULE ORAL at 11:35

## 2018-03-17 RX ADMIN — SODIUM POLYSTYRENE SULFONATE 30 G: 15 SUSPENSION ORAL; RECTAL at 09:13

## 2018-03-17 RX ADMIN — FLUTICASONE PROPIONATE 1 SPRAY: 50 SPRAY, METERED NASAL at 07:57

## 2018-03-17 RX ADMIN — MULTIPLE VITAMINS W/ MINERALS TAB 1 TABLET: TAB at 07:58

## 2018-03-17 ASSESSMENT — PAIN SCALES - GENERAL: PAINLEVEL_OUTOF10: 0

## 2018-03-18 PROBLEM — Z20.828 EXPOSURE TO INFLUENZA: Status: RESOLVED | Noted: 2018-02-04 | Resolved: 2018-03-18

## 2018-03-18 LAB
ANION GAP SERPL CALCULATED.3IONS-SCNC: 16 MEQ/L (ref 8–16)
ANION GAP SERPL CALCULATED.3IONS-SCNC: 17 MEQ/L (ref 8–16)
BUN BLDV-MCNC: 80 MG/DL (ref 7–22)
BUN BLDV-MCNC: 83 MG/DL (ref 7–22)
CALCIUM SERPL-MCNC: 8.9 MG/DL (ref 8.5–10.5)
CALCIUM SERPL-MCNC: 9.5 MG/DL (ref 8.5–10.5)
CHLORIDE BLD-SCNC: 92 MEQ/L (ref 98–111)
CHLORIDE BLD-SCNC: 94 MEQ/L (ref 98–111)
CO2: 28 MEQ/L (ref 23–33)
CO2: 28 MEQ/L (ref 23–33)
CREAT SERPL-MCNC: 8.3 MG/DL (ref 0.4–1.2)
CREAT SERPL-MCNC: 9 MG/DL (ref 0.4–1.2)
EKG ATRIAL RATE: 226 BPM
EKG Q-T INTERVAL: 368 MS
EKG QRS DURATION: 104 MS
EKG QTC CALCULATION (BAZETT): 504 MS
EKG R AXIS: 0 DEGREES
EKG T AXIS: 104 DEGREES
EKG VENTRICULAR RATE: 113 BPM
GFR SERPL CREATININE-BSD FRML MDRD: 8 ML/MIN/1.73M2
GFR SERPL CREATININE-BSD FRML MDRD: 8 ML/MIN/1.73M2
GLUCOSE BLD-MCNC: 119 MG/DL (ref 70–108)
GLUCOSE BLD-MCNC: 92 MG/DL (ref 70–108)
HCT VFR BLD CALC: 29.1 % (ref 42–52)
HEMOGLOBIN: 9.8 GM/DL (ref 14–18)
MAGNESIUM: 2.8 MG/DL (ref 1.6–2.4)
MAGNESIUM: 2.9 MG/DL (ref 1.6–2.4)
MCH RBC QN AUTO: 32.7 PG (ref 27–31)
MCHC RBC AUTO-ENTMCNC: 33.9 GM/DL (ref 33–37)
MCV RBC AUTO: 96.6 FL (ref 80–94)
PDW BLD-RTO: 14.2 % (ref 11.5–14.5)
PLATELET # BLD: 120 THOU/MM3 (ref 130–400)
PMV BLD AUTO: 7.6 FL (ref 7.4–10.4)
POTASSIUM REFLEX MAGNESIUM: 6 MEQ/L (ref 3.5–5.2)
POTASSIUM SERPL-SCNC: 5.5 MEQ/L (ref 3.5–5.2)
POTASSIUM SERPL-SCNC: 5.5 MEQ/L (ref 3.5–5.2)
RBC # BLD: 3.01 MILL/MM3 (ref 4.7–6.1)
SODIUM BLD-SCNC: 136 MEQ/L (ref 135–145)
SODIUM BLD-SCNC: 139 MEQ/L (ref 135–145)
TROPONIN T: 0.03 NG/ML
TROPONIN T: 0.04 NG/ML
WBC # BLD: 5 THOU/MM3 (ref 4.8–10.8)

## 2018-03-18 PROCEDURE — 6360000002 HC RX W HCPCS: Performed by: INTERNAL MEDICINE

## 2018-03-18 PROCEDURE — 85027 COMPLETE CBC AUTOMATED: CPT

## 2018-03-18 PROCEDURE — 2500000003 HC RX 250 WO HCPCS: Performed by: INTERNAL MEDICINE

## 2018-03-18 PROCEDURE — 6370000000 HC RX 637 (ALT 250 FOR IP): Performed by: INTERNAL MEDICINE

## 2018-03-18 PROCEDURE — 99233 SBSQ HOSP IP/OBS HIGH 50: CPT | Performed by: INTERNAL MEDICINE

## 2018-03-18 PROCEDURE — 84484 ASSAY OF TROPONIN QUANT: CPT

## 2018-03-18 PROCEDURE — 99232 SBSQ HOSP IP/OBS MODERATE 35: CPT | Performed by: PHYSICIAN ASSISTANT

## 2018-03-18 PROCEDURE — 84132 ASSAY OF SERUM POTASSIUM: CPT

## 2018-03-18 PROCEDURE — 2580000003 HC RX 258: Performed by: INTERNAL MEDICINE

## 2018-03-18 PROCEDURE — 99232 SBSQ HOSP IP/OBS MODERATE 35: CPT | Performed by: INTERNAL MEDICINE

## 2018-03-18 PROCEDURE — 93005 ELECTROCARDIOGRAM TRACING: CPT | Performed by: INTERNAL MEDICINE

## 2018-03-18 PROCEDURE — 83735 ASSAY OF MAGNESIUM: CPT

## 2018-03-18 PROCEDURE — 2500000003 HC RX 250 WO HCPCS: Performed by: HOSPITALIST

## 2018-03-18 PROCEDURE — 1200000003 HC TELEMETRY R&B

## 2018-03-18 PROCEDURE — 6360000002 HC RX W HCPCS: Performed by: HOSPITALIST

## 2018-03-18 PROCEDURE — 6370000000 HC RX 637 (ALT 250 FOR IP): Performed by: HOSPITALIST

## 2018-03-18 PROCEDURE — 36415 COLL VENOUS BLD VENIPUNCTURE: CPT

## 2018-03-18 PROCEDURE — 80048 BASIC METABOLIC PNL TOTAL CA: CPT

## 2018-03-18 RX ORDER — CALCIUM GLUCONATE 94 MG/ML
1 INJECTION, SOLUTION INTRAVENOUS ONCE
Status: COMPLETED | OUTPATIENT
Start: 2018-03-18 | End: 2018-03-18

## 2018-03-18 RX ORDER — ASPIRIN 81 MG/1
81 TABLET, CHEWABLE ORAL DAILY
Status: DISCONTINUED | OUTPATIENT
Start: 2018-03-18 | End: 2018-03-19

## 2018-03-18 RX ORDER — HYDRALAZINE HYDROCHLORIDE 20 MG/ML
20 INJECTION INTRAMUSCULAR; INTRAVENOUS EVERY 6 HOURS PRN
Status: DISCONTINUED | OUTPATIENT
Start: 2018-03-18 | End: 2018-03-27 | Stop reason: HOSPADM

## 2018-03-18 RX ORDER — SODIUM POLYSTYRENE SULFONATE 15 G/60ML
15 SUSPENSION ORAL; RECTAL ONCE
Status: COMPLETED | OUTPATIENT
Start: 2018-03-18 | End: 2018-03-18

## 2018-03-18 RX ORDER — SODIUM POLYSTYRENE SULFONATE 15 G/60ML
30 SUSPENSION ORAL; RECTAL ONCE
Status: COMPLETED | OUTPATIENT
Start: 2018-03-18 | End: 2018-03-18

## 2018-03-18 RX ORDER — HYDRALAZINE HYDROCHLORIDE 20 MG/ML
10 INJECTION INTRAMUSCULAR; INTRAVENOUS EVERY 6 HOURS PRN
Status: DISCONTINUED | OUTPATIENT
Start: 2018-03-18 | End: 2018-03-18

## 2018-03-18 RX ORDER — ATORVASTATIN CALCIUM 40 MG/1
40 TABLET, FILM COATED ORAL NIGHTLY
Status: DISCONTINUED | OUTPATIENT
Start: 2018-03-18 | End: 2018-03-27 | Stop reason: HOSPADM

## 2018-03-18 RX ORDER — SODIUM POLYSTYRENE SULFONATE 15 G/60ML
15 SUSPENSION ORAL; RECTAL ONCE
Status: DISCONTINUED | OUTPATIENT
Start: 2018-03-18 | End: 2018-03-18

## 2018-03-18 RX ORDER — LABETALOL 300 MG/1
300 TABLET, FILM COATED ORAL EVERY 8 HOURS SCHEDULED
Status: DISCONTINUED | OUTPATIENT
Start: 2018-03-18 | End: 2018-03-18

## 2018-03-18 RX ADMIN — CLONIDINE HYDROCHLORIDE 0.3 MG: 0.2 TABLET ORAL at 09:23

## 2018-03-18 RX ADMIN — VERAPAMIL HYDROCHLORIDE 240 MG: 240 TABLET, FILM COATED, EXTENDED RELEASE ORAL at 08:22

## 2018-03-18 RX ADMIN — MINOXIDIL 15 MG: 2.5 TABLET ORAL at 08:22

## 2018-03-18 RX ADMIN — SODIUM BICARBONATE 50 MEQ: 84 INJECTION, SOLUTION INTRAVENOUS at 08:16

## 2018-03-18 RX ADMIN — CLONIDINE HYDROCHLORIDE 0.3 MG: 0.2 TABLET ORAL at 14:55

## 2018-03-18 RX ADMIN — VITAMIN D, TAB 1000IU (100/BT) 2000 UNITS: 25 TAB at 21:26

## 2018-03-18 RX ADMIN — SERTRALINE 100 MG: 100 TABLET, FILM COATED ORAL at 08:22

## 2018-03-18 RX ADMIN — CLONIDINE HYDROCHLORIDE 0.3 MG: 0.2 TABLET ORAL at 21:26

## 2018-03-18 RX ADMIN — SODIUM BICARBONATE 50 MEQ: 84 INJECTION, SOLUTION INTRAVENOUS at 00:35

## 2018-03-18 RX ADMIN — AMIODARONE HYDROCHLORIDE 0.5 MG/MIN: 50 INJECTION, SOLUTION INTRAVENOUS at 12:04

## 2018-03-18 RX ADMIN — SEVELAMER CARBONATE 1600 MG: 800 TABLET, FILM COATED ORAL at 08:22

## 2018-03-18 RX ADMIN — METOPROLOL TARTRATE 5 MG: 5 INJECTION INTRAVENOUS at 08:22

## 2018-03-18 RX ADMIN — SEVELAMER CARBONATE 1600 MG: 800 TABLET, FILM COATED ORAL at 12:05

## 2018-03-18 RX ADMIN — CALCIUM GLUCONATE 1 G: 94 INJECTION, SOLUTION INTRAVENOUS at 00:35

## 2018-03-18 RX ADMIN — FAMOTIDINE 20 MG: 20 TABLET, FILM COATED ORAL at 08:22

## 2018-03-18 RX ADMIN — METOPROLOL TARTRATE 5 MG: 5 INJECTION INTRAVENOUS at 03:31

## 2018-03-18 RX ADMIN — SEVELAMER CARBONATE 1600 MG: 800 TABLET, FILM COATED ORAL at 16:25

## 2018-03-18 RX ADMIN — AMIODARONE HYDROCHLORIDE 150 MG: 50 INJECTION, SOLUTION INTRAVENOUS at 01:04

## 2018-03-18 RX ADMIN — HEPARIN SODIUM 5000 UNITS: 5000 INJECTION, SOLUTION INTRAVENOUS; SUBCUTANEOUS at 22:00

## 2018-03-18 RX ADMIN — HEPARIN SODIUM 5000 UNITS: 5000 INJECTION, SOLUTION INTRAVENOUS; SUBCUTANEOUS at 06:43

## 2018-03-18 RX ADMIN — ASPIRIN 81 MG: 81 TABLET, CHEWABLE ORAL at 08:22

## 2018-03-18 RX ADMIN — ATORVASTATIN CALCIUM 40 MG: 40 TABLET, FILM COATED ORAL at 06:43

## 2018-03-18 RX ADMIN — HEPARIN SODIUM 5000 UNITS: 5000 INJECTION, SOLUTION INTRAVENOUS; SUBCUTANEOUS at 14:56

## 2018-03-18 RX ADMIN — ISOSORBIDE MONONITRATE 120 MG: 60 TABLET ORAL at 08:22

## 2018-03-18 RX ADMIN — Medication 15 G: at 14:55

## 2018-03-18 RX ADMIN — ATORVASTATIN CALCIUM 40 MG: 40 TABLET, FILM COATED ORAL at 21:26

## 2018-03-18 RX ADMIN — MINOXIDIL 15 MG: 2.5 TABLET ORAL at 21:27

## 2018-03-18 RX ADMIN — MULTIPLE VITAMINS W/ MINERALS TAB 1 TABLET: TAB at 08:23

## 2018-03-18 RX ADMIN — AMIODARONE HYDROCHLORIDE 1 MG/MIN: 50 INJECTION, SOLUTION INTRAVENOUS at 01:20

## 2018-03-18 RX ADMIN — VITAMIN D, TAB 1000IU (100/BT) 2000 UNITS: 25 TAB at 08:22

## 2018-03-18 RX ADMIN — FLUTICASONE PROPIONATE 1 SPRAY: 50 SPRAY, METERED NASAL at 21:27

## 2018-03-18 RX ADMIN — DOXAZOSIN 8 MG: 4 TABLET ORAL at 21:26

## 2018-03-18 RX ADMIN — SODIUM POLYSTYRENE SULFONATE 30 G: 15 SUSPENSION ORAL; RECTAL at 08:16

## 2018-03-18 RX ADMIN — FLUTICASONE PROPIONATE 1 SPRAY: 50 SPRAY, METERED NASAL at 08:21

## 2018-03-18 RX ADMIN — DOXAZOSIN 8 MG: 4 TABLET ORAL at 08:22

## 2018-03-19 ENCOUNTER — APPOINTMENT (OUTPATIENT)
Dept: INTERVENTIONAL RADIOLOGY/VASCULAR | Age: 53
DRG: 252 | End: 2018-03-19
Attending: HOSPITALIST
Payer: MEDICARE

## 2018-03-19 LAB
ALBUMIN SERPL-MCNC: 3.7 G/DL (ref 3.5–5.1)
ANION GAP SERPL CALCULATED.3IONS-SCNC: 19 MEQ/L (ref 8–16)
ANISOCYTOSIS: ABNORMAL
BASOPHILS # BLD: 0.3 %
BASOPHILS ABSOLUTE: 0 THOU/MM3 (ref 0–0.1)
BUN BLDV-MCNC: 102 MG/DL (ref 7–22)
CALCIUM SERPL-MCNC: 8.7 MG/DL (ref 8.5–10.5)
CHLORIDE BLD-SCNC: 93 MEQ/L (ref 98–111)
CO2: 27 MEQ/L (ref 23–33)
CREAT SERPL-MCNC: 10.7 MG/DL (ref 0.4–1.2)
EOSINOPHIL # BLD: 3.9 %
EOSINOPHILS ABSOLUTE: 0.2 THOU/MM3 (ref 0–0.4)
GFR SERPL CREATININE-BSD FRML MDRD: 6 ML/MIN/1.73M2
GLUCOSE BLD-MCNC: 109 MG/DL (ref 70–108)
GLUCOSE BLD-MCNC: 132 MG/DL (ref 70–108)
HCT VFR BLD CALC: 26.8 % (ref 42–52)
HEMOGLOBIN: 8.8 GM/DL (ref 14–18)
LYMPHOCYTES # BLD: 15 %
LYMPHOCYTES ABSOLUTE: 0.7 THOU/MM3 (ref 1–4.8)
MAGNESIUM: 3 MG/DL (ref 1.6–2.4)
MCH RBC QN AUTO: 31.7 PG (ref 27–31)
MCHC RBC AUTO-ENTMCNC: 32.9 GM/DL (ref 33–37)
MCV RBC AUTO: 96.3 FL (ref 80–94)
MONOCYTES # BLD: 10.2 %
MONOCYTES ABSOLUTE: 0.5 THOU/MM3 (ref 0.4–1.3)
NUCLEATED RED BLOOD CELLS: 0 /100 WBC
PDW BLD-RTO: 15 % (ref 11.5–14.5)
PHOSPHORUS: 5.9 MG/DL (ref 2.4–4.7)
PLATELET # BLD: 117 THOU/MM3 (ref 130–400)
PMV BLD AUTO: 6.8 FL (ref 7.4–10.4)
POTASSIUM SERPL-SCNC: 4.6 MEQ/L (ref 3.5–5.2)
RBC # BLD: 2.79 MILL/MM3 (ref 4.7–6.1)
SEG NEUTROPHILS: 70.6 %
SEGMENTED NEUTROPHILS ABSOLUTE COUNT: 3.5 THOU/MM3 (ref 1.8–7.7)
SODIUM BLD-SCNC: 139 MEQ/L (ref 135–145)
WBC # BLD: 4.9 THOU/MM3 (ref 4.8–10.8)

## 2018-03-19 PROCEDURE — 36902 INTRO CATH DIALYSIS CIRCUIT: CPT | Performed by: RADIOLOGY

## 2018-03-19 PROCEDURE — 99232 SBSQ HOSP IP/OBS MODERATE 35: CPT | Performed by: INTERNAL MEDICINE

## 2018-03-19 PROCEDURE — 6370000000 HC RX 637 (ALT 250 FOR IP): Performed by: INTERNAL MEDICINE

## 2018-03-19 PROCEDURE — 05763ZZ DILATION OF LEFT SUBCLAVIAN VEIN, PERCUTANEOUS APPROACH: ICD-10-PCS | Performed by: RADIOLOGY

## 2018-03-19 PROCEDURE — 2500000003 HC RX 250 WO HCPCS

## 2018-03-19 PROCEDURE — 2580000003 HC RX 258: Performed by: INTERNAL MEDICINE

## 2018-03-19 PROCEDURE — 6360000002 HC RX W HCPCS: Performed by: INTERNAL MEDICINE

## 2018-03-19 PROCEDURE — 90935 HEMODIALYSIS ONE EVALUATION: CPT

## 2018-03-19 PROCEDURE — B51W1ZZ FLUOROSCOPY OF DIALYSIS SHUNT/FISTULA USING LOW OSMOLAR CONTRAST: ICD-10-PCS | Performed by: RADIOLOGY

## 2018-03-19 PROCEDURE — 90935 HEMODIALYSIS ONE EVALUATION: CPT | Performed by: INTERNAL MEDICINE

## 2018-03-19 PROCEDURE — C1725 CATH, TRANSLUMIN NON-LASER: HCPCS

## 2018-03-19 PROCEDURE — 6370000000 HC RX 637 (ALT 250 FOR IP): Performed by: HOSPITALIST

## 2018-03-19 PROCEDURE — 6360000002 HC RX W HCPCS: Performed by: RADIOLOGY

## 2018-03-19 PROCEDURE — C1894 INTRO/SHEATH, NON-LASER: HCPCS

## 2018-03-19 PROCEDURE — C1769 GUIDE WIRE: HCPCS

## 2018-03-19 PROCEDURE — 85025 COMPLETE CBC W/AUTO DIFF WBC: CPT

## 2018-03-19 PROCEDURE — 80069 RENAL FUNCTION PANEL: CPT

## 2018-03-19 PROCEDURE — 82948 REAGENT STRIP/BLOOD GLUCOSE: CPT

## 2018-03-19 PROCEDURE — 6360000004 HC RX CONTRAST MEDICATION: Performed by: RADIOLOGY

## 2018-03-19 PROCEDURE — 36415 COLL VENOUS BLD VENIPUNCTURE: CPT

## 2018-03-19 PROCEDURE — 83735 ASSAY OF MAGNESIUM: CPT

## 2018-03-19 PROCEDURE — 6360000002 HC RX W HCPCS

## 2018-03-19 PROCEDURE — 1200000003 HC TELEMETRY R&B

## 2018-03-19 PROCEDURE — 5A1D70Z PERFORMANCE OF URINARY FILTRATION, INTERMITTENT, LESS THAN 6 HOURS PER DAY: ICD-10-PCS | Performed by: HOSPITALIST

## 2018-03-19 RX ORDER — MIDAZOLAM HYDROCHLORIDE 1 MG/ML
1 INJECTION INTRAMUSCULAR; INTRAVENOUS ONCE
Status: COMPLETED | OUTPATIENT
Start: 2018-03-19 | End: 2018-03-19

## 2018-03-19 RX ORDER — HEPARIN SODIUM 1000 [USP'U]/ML
2000 INJECTION, SOLUTION INTRAVENOUS; SUBCUTANEOUS ONCE
Status: COMPLETED | OUTPATIENT
Start: 2018-03-19 | End: 2018-03-19

## 2018-03-19 RX ADMIN — CLONIDINE HYDROCHLORIDE 0.3 MG: 0.2 TABLET ORAL at 20:38

## 2018-03-19 RX ADMIN — ATORVASTATIN CALCIUM 40 MG: 40 TABLET, FILM COATED ORAL at 20:38

## 2018-03-19 RX ADMIN — CLONIDINE HYDROCHLORIDE 0.3 MG: 0.2 TABLET ORAL at 16:58

## 2018-03-19 RX ADMIN — MINOXIDIL 15 MG: 2.5 TABLET ORAL at 20:38

## 2018-03-19 RX ADMIN — MINOXIDIL 15 MG: 2.5 TABLET ORAL at 06:45

## 2018-03-19 RX ADMIN — ISOSORBIDE MONONITRATE 120 MG: 60 TABLET ORAL at 06:45

## 2018-03-19 RX ADMIN — HYDROMORPHONE HYDROCHLORIDE 0.5 MG: 1 INJECTION, SOLUTION INTRAMUSCULAR; INTRAVENOUS; SUBCUTANEOUS at 16:06

## 2018-03-19 RX ADMIN — FLUTICASONE PROPIONATE 1 SPRAY: 50 SPRAY, METERED NASAL at 16:58

## 2018-03-19 RX ADMIN — AMIODARONE HYDROCHLORIDE 0.5 MG/MIN: 50 INJECTION, SOLUTION INTRAVENOUS at 20:24

## 2018-03-19 RX ADMIN — SEVELAMER CARBONATE 1600 MG: 800 TABLET, FILM COATED ORAL at 06:45

## 2018-03-19 RX ADMIN — DOXAZOSIN 8 MG: 4 TABLET ORAL at 20:38

## 2018-03-19 RX ADMIN — VITAMIN D, TAB 1000IU (100/BT) 2000 UNITS: 25 TAB at 16:58

## 2018-03-19 RX ADMIN — HYDROMORPHONE HYDROCHLORIDE 1 MG: 1 INJECTION, SOLUTION INTRAMUSCULAR; INTRAVENOUS; SUBCUTANEOUS at 15:45

## 2018-03-19 RX ADMIN — MULTIPLE VITAMINS W/ MINERALS TAB 1 TABLET: TAB at 16:58

## 2018-03-19 RX ADMIN — HEPARIN SODIUM 2000 UNITS: 1000 INJECTION, SOLUTION INTRAVENOUS; SUBCUTANEOUS at 16:02

## 2018-03-19 RX ADMIN — CLONIDINE HYDROCHLORIDE 0.3 MG: 0.2 TABLET ORAL at 06:44

## 2018-03-19 RX ADMIN — FLUTICASONE PROPIONATE 1 SPRAY: 50 SPRAY, METERED NASAL at 20:40

## 2018-03-19 RX ADMIN — IOVERSOL 70 ML: 509 INJECTION INTRA-ARTERIAL; INTRAVENOUS at 16:22

## 2018-03-19 RX ADMIN — VITAMIN D, TAB 1000IU (100/BT) 2000 UNITS: 25 TAB at 20:30

## 2018-03-19 RX ADMIN — VERAPAMIL HYDROCHLORIDE 240 MG: 240 TABLET, FILM COATED, EXTENDED RELEASE ORAL at 06:45

## 2018-03-19 RX ADMIN — DOXAZOSIN 8 MG: 4 TABLET ORAL at 06:44

## 2018-03-19 RX ADMIN — FAMOTIDINE 20 MG: 20 TABLET, FILM COATED ORAL at 16:58

## 2018-03-19 RX ADMIN — AMIODARONE HYDROCHLORIDE 0.5 MG/MIN: 50 INJECTION, SOLUTION INTRAVENOUS at 04:46

## 2018-03-19 RX ADMIN — SERTRALINE 100 MG: 100 TABLET, FILM COATED ORAL at 16:58

## 2018-03-19 RX ADMIN — SEVELAMER CARBONATE 1600 MG: 800 TABLET, FILM COATED ORAL at 16:57

## 2018-03-19 RX ADMIN — MIDAZOLAM 1 MG: 1 INJECTION INTRAMUSCULAR; INTRAVENOUS at 15:45

## 2018-03-19 ASSESSMENT — PAIN SCALES - GENERAL
PAINLEVEL_OUTOF10: 0
PAINLEVEL_OUTOF10: 0

## 2018-03-20 LAB
ALBUMIN SERPL-MCNC: 3.8 G/DL (ref 3.5–5.1)
AMPHETAMINE+METHAMPHETAMINE URINE SCREEN: NEGATIVE
ANION GAP SERPL CALCULATED.3IONS-SCNC: 15 MEQ/L (ref 8–16)
APTT: 32.5 SECONDS (ref 22–38)
APTT: 46.7 SECONDS (ref 22–38)
BARBITURATE QUANTITATIVE URINE: NEGATIVE
BASOPHILS # BLD: 0.6 %
BASOPHILS ABSOLUTE: 0 THOU/MM3 (ref 0–0.1)
BENZODIAZEPINE QUANTITATIVE URINE: NEGATIVE
BUN BLDV-MCNC: 59 MG/DL (ref 7–22)
CALCIUM SERPL-MCNC: 9.1 MG/DL (ref 8.5–10.5)
CANNABINOID QUANTITATIVE URINE: NEGATIVE
CHLORIDE BLD-SCNC: 92 MEQ/L (ref 98–111)
CO2: 28 MEQ/L (ref 23–33)
COCAINE METABOLITE QUANTITATIVE URINE: POSITIVE
CREAT SERPL-MCNC: 8.4 MG/DL (ref 0.4–1.2)
EKG ATRIAL RATE: 214 BPM
EKG P AXIS: 97 DEGREES
EKG Q-T INTERVAL: 388 MS
EKG QRS DURATION: 104 MS
EKG QTC CALCULATION (BAZETT): 500 MS
EKG R AXIS: -54 DEGREES
EKG T AXIS: 53 DEGREES
EKG VENTRICULAR RATE: 100 BPM
EOSINOPHIL # BLD: 3.8 %
EOSINOPHILS ABSOLUTE: 0.2 THOU/MM3 (ref 0–0.4)
GFR SERPL CREATININE-BSD FRML MDRD: 8 ML/MIN/1.73M2
GLUCOSE BLD-MCNC: 98 MG/DL (ref 70–108)
GLUCOSE BLD-MCNC: 99 MG/DL (ref 70–108)
HCT VFR BLD CALC: 27.3 % (ref 42–52)
HCT VFR BLD CALC: 27.4 % (ref 42–52)
HEMOGLOBIN: 9.5 GM/DL (ref 14–18)
HEMOGLOBIN: 9.5 GM/DL (ref 14–18)
LYMPHOCYTES # BLD: 12.3 %
LYMPHOCYTES ABSOLUTE: 0.6 THOU/MM3 (ref 1–4.8)
MAGNESIUM: 2.5 MG/DL (ref 1.6–2.4)
MCH RBC QN AUTO: 32.7 PG (ref 27–31)
MCH RBC QN AUTO: 32.8 PG (ref 27–31)
MCHC RBC AUTO-ENTMCNC: 34.8 GM/DL (ref 33–37)
MCHC RBC AUTO-ENTMCNC: 34.8 GM/DL (ref 33–37)
MCV RBC AUTO: 93.9 FL (ref 80–94)
MCV RBC AUTO: 94.2 FL (ref 80–94)
MONOCYTES # BLD: 10.6 %
MONOCYTES ABSOLUTE: 0.5 THOU/MM3 (ref 0.4–1.3)
NUCLEATED RED BLOOD CELLS: 0 /100 WBC
OPIATES, URINE: NEGATIVE
OXYCODONE: NEGATIVE
PDW BLD-RTO: 13.8 % (ref 11.5–14.5)
PDW BLD-RTO: 13.8 % (ref 11.5–14.5)
PHENCYCLIDINE QUANTITATIVE URINE: NEGATIVE
PHOSPHORUS: 5.2 MG/DL (ref 2.4–4.7)
PLATELET # BLD: 113 THOU/MM3 (ref 130–400)
PLATELET # BLD: 116 THOU/MM3 (ref 130–400)
PMV BLD AUTO: 7.3 FL (ref 7.4–10.4)
PMV BLD AUTO: 7.4 FL (ref 7.4–10.4)
POTASSIUM SERPL-SCNC: 5 MEQ/L (ref 3.5–5.2)
RBC # BLD: 2.9 MILL/MM3 (ref 4.7–6.1)
RBC # BLD: 2.91 MILL/MM3 (ref 4.7–6.1)
SEG NEUTROPHILS: 72.7 %
SEGMENTED NEUTROPHILS ABSOLUTE COUNT: 3.4 THOU/MM3 (ref 1.8–7.7)
SODIUM BLD-SCNC: 135 MEQ/L (ref 135–145)
WBC # BLD: 4.7 THOU/MM3 (ref 4.8–10.8)
WBC # BLD: 4.7 THOU/MM3 (ref 4.8–10.8)

## 2018-03-20 PROCEDURE — 99232 SBSQ HOSP IP/OBS MODERATE 35: CPT | Performed by: PHYSICIAN ASSISTANT

## 2018-03-20 PROCEDURE — 6370000000 HC RX 637 (ALT 250 FOR IP)

## 2018-03-20 PROCEDURE — 93010 ELECTROCARDIOGRAM REPORT: CPT | Performed by: NUCLEAR MEDICINE

## 2018-03-20 PROCEDURE — 80069 RENAL FUNCTION PANEL: CPT

## 2018-03-20 PROCEDURE — 85730 THROMBOPLASTIN TIME PARTIAL: CPT

## 2018-03-20 PROCEDURE — 82948 REAGENT STRIP/BLOOD GLUCOSE: CPT

## 2018-03-20 PROCEDURE — 1200000003 HC TELEMETRY R&B

## 2018-03-20 PROCEDURE — 6370000000 HC RX 637 (ALT 250 FOR IP): Performed by: INTERNAL MEDICINE

## 2018-03-20 PROCEDURE — 85025 COMPLETE CBC W/AUTO DIFF WBC: CPT

## 2018-03-20 PROCEDURE — 6370000000 HC RX 637 (ALT 250 FOR IP): Performed by: HOSPITALIST

## 2018-03-20 PROCEDURE — 36415 COLL VENOUS BLD VENIPUNCTURE: CPT

## 2018-03-20 PROCEDURE — 6360000002 HC RX W HCPCS: Performed by: INTERNAL MEDICINE

## 2018-03-20 PROCEDURE — 99232 SBSQ HOSP IP/OBS MODERATE 35: CPT | Performed by: INTERNAL MEDICINE

## 2018-03-20 PROCEDURE — 99233 SBSQ HOSP IP/OBS HIGH 50: CPT | Performed by: HOSPITALIST

## 2018-03-20 PROCEDURE — 83735 ASSAY OF MAGNESIUM: CPT

## 2018-03-20 PROCEDURE — 99223 1ST HOSP IP/OBS HIGH 75: CPT | Performed by: INTERNAL MEDICINE

## 2018-03-20 PROCEDURE — 80307 DRUG TEST PRSMV CHEM ANLYZR: CPT

## 2018-03-20 PROCEDURE — 93005 ELECTROCARDIOGRAM TRACING: CPT | Performed by: INTERNAL MEDICINE

## 2018-03-20 PROCEDURE — 85027 COMPLETE CBC AUTOMATED: CPT

## 2018-03-20 PROCEDURE — 2580000003 HC RX 258: Performed by: INTERNAL MEDICINE

## 2018-03-20 RX ORDER — HEPARIN SODIUM 10000 [USP'U]/100ML
18 INJECTION, SOLUTION INTRAVENOUS CONTINUOUS
Status: DISCONTINUED | OUTPATIENT
Start: 2018-03-20 | End: 2018-03-22

## 2018-03-20 RX ORDER — NITROGLYCERIN 0.4 MG/1
0.4 TABLET SUBLINGUAL EVERY 5 MIN PRN
Status: DISCONTINUED | OUTPATIENT
Start: 2018-03-20 | End: 2018-03-27 | Stop reason: HOSPADM

## 2018-03-20 RX ORDER — HEPARIN SODIUM 1000 [USP'U]/ML
80 INJECTION, SOLUTION INTRAVENOUS; SUBCUTANEOUS PRN
Status: DISCONTINUED | OUTPATIENT
Start: 2018-03-20 | End: 2018-03-20

## 2018-03-20 RX ORDER — HEPARIN SODIUM 1000 [USP'U]/ML
80 INJECTION, SOLUTION INTRAVENOUS; SUBCUTANEOUS ONCE
Status: DISCONTINUED | OUTPATIENT
Start: 2018-03-20 | End: 2018-03-20

## 2018-03-20 RX ORDER — HEPARIN SODIUM 1000 [USP'U]/ML
40 INJECTION, SOLUTION INTRAVENOUS; SUBCUTANEOUS PRN
Status: DISCONTINUED | OUTPATIENT
Start: 2018-03-20 | End: 2018-03-20

## 2018-03-20 RX ORDER — NITROGLYCERIN 0.4 MG/1
TABLET SUBLINGUAL
Status: COMPLETED
Start: 2018-03-20 | End: 2018-03-20

## 2018-03-20 RX ADMIN — MULTIPLE VITAMINS W/ MINERALS TAB 1 TABLET: TAB at 08:57

## 2018-03-20 RX ADMIN — MINOXIDIL 15 MG: 2.5 TABLET ORAL at 08:58

## 2018-03-20 RX ADMIN — SERTRALINE 100 MG: 100 TABLET, FILM COATED ORAL at 08:58

## 2018-03-20 RX ADMIN — ISOSORBIDE MONONITRATE 120 MG: 60 TABLET ORAL at 08:58

## 2018-03-20 RX ADMIN — HEPARIN SODIUM 18 UNITS/KG/HR: 10000 INJECTION, SOLUTION INTRAVENOUS at 12:57

## 2018-03-20 RX ADMIN — VITAMIN D, TAB 1000IU (100/BT) 2000 UNITS: 25 TAB at 08:57

## 2018-03-20 RX ADMIN — CLONIDINE HYDROCHLORIDE 0.3 MG: 0.2 TABLET ORAL at 13:03

## 2018-03-20 RX ADMIN — FLUTICASONE PROPIONATE 1 SPRAY: 50 SPRAY, METERED NASAL at 21:02

## 2018-03-20 RX ADMIN — SEVELAMER CARBONATE 1600 MG: 800 TABLET, FILM COATED ORAL at 17:05

## 2018-03-20 RX ADMIN — DOXAZOSIN 8 MG: 4 TABLET ORAL at 08:57

## 2018-03-20 RX ADMIN — NITROGLYCERIN: 0.4 TABLET SUBLINGUAL at 15:52

## 2018-03-20 RX ADMIN — AMIODARONE HYDROCHLORIDE 0.5 MG/MIN: 50 INJECTION, SOLUTION INTRAVENOUS at 09:42

## 2018-03-20 RX ADMIN — VITAMIN D, TAB 1000IU (100/BT) 2000 UNITS: 25 TAB at 21:01

## 2018-03-20 RX ADMIN — TRAZODONE HYDROCHLORIDE 50 MG: 50 TABLET ORAL at 21:01

## 2018-03-20 RX ADMIN — FLUTICASONE PROPIONATE 1 SPRAY: 50 SPRAY, METERED NASAL at 08:57

## 2018-03-20 RX ADMIN — SEVELAMER CARBONATE 1600 MG: 800 TABLET, FILM COATED ORAL at 08:58

## 2018-03-20 RX ADMIN — CLONIDINE HYDROCHLORIDE 0.3 MG: 0.2 TABLET ORAL at 08:57

## 2018-03-20 RX ADMIN — ATORVASTATIN CALCIUM 40 MG: 40 TABLET, FILM COATED ORAL at 21:01

## 2018-03-20 RX ADMIN — VERAPAMIL HYDROCHLORIDE 240 MG: 240 TABLET, FILM COATED, EXTENDED RELEASE ORAL at 08:58

## 2018-03-20 RX ADMIN — ALUMINUM HYDROXIDE, MAGNESIUM HYDROXIDE, AND SIMETHICONE 30 ML: 200; 200; 20 SUSPENSION ORAL at 15:51

## 2018-03-20 RX ADMIN — ACETAMINOPHEN 650 MG: 325 TABLET ORAL at 09:08

## 2018-03-20 RX ADMIN — FAMOTIDINE 20 MG: 20 TABLET, FILM COATED ORAL at 13:03

## 2018-03-20 RX ADMIN — SEVELAMER CARBONATE 1600 MG: 800 TABLET, FILM COATED ORAL at 13:03

## 2018-03-20 ASSESSMENT — ENCOUNTER SYMPTOMS
DOUBLE VISION: 0
SHORTNESS OF BREATH: 0
CONSTIPATION: 0
ABDOMINAL PAIN: 0
BLURRED VISION: 0
BACK PAIN: 0
RIGHT EYE: 0
COUGH: 0
VOMITING: 0
DIARRHEA: 0
WHEEZING: 0
SORE THROAT: 0
LEFT EYE: 0
NAUSEA: 0

## 2018-03-20 ASSESSMENT — PAIN SCALES - GENERAL
PAINLEVEL_OUTOF10: 4
PAINLEVEL_OUTOF10: 8
PAINLEVEL_OUTOF10: 6
PAINLEVEL_OUTOF10: 7
PAINLEVEL_OUTOF10: 5

## 2018-03-20 ASSESSMENT — PAIN DESCRIPTION - ORIENTATION
ORIENTATION: LEFT
ORIENTATION: LEFT

## 2018-03-20 ASSESSMENT — PAIN DESCRIPTION - ONSET: ONSET: GRADUAL

## 2018-03-20 ASSESSMENT — PAIN DESCRIPTION - PAIN TYPE
TYPE: ACUTE PAIN
TYPE: ACUTE PAIN

## 2018-03-20 ASSESSMENT — PAIN DESCRIPTION - FREQUENCY: FREQUENCY: CONTINUOUS

## 2018-03-20 ASSESSMENT — PAIN DESCRIPTION - LOCATION
LOCATION: CHEST
LOCATION: ARM

## 2018-03-20 ASSESSMENT — PAIN DESCRIPTION - DESCRIPTORS
DESCRIPTORS: OTHER (COMMENT)
DESCRIPTORS: PRESSURE

## 2018-03-21 ENCOUNTER — APPOINTMENT (OUTPATIENT)
Dept: CT IMAGING | Age: 53
DRG: 252 | End: 2018-03-21
Attending: HOSPITALIST
Payer: MEDICARE

## 2018-03-21 ENCOUNTER — APPOINTMENT (OUTPATIENT)
Dept: CARDIAC CATH/INVASIVE PROCEDURES | Age: 53
DRG: 252 | End: 2018-03-21
Attending: HOSPITALIST
Payer: MEDICARE

## 2018-03-21 LAB
ALBUMIN SERPL-MCNC: 3.8 G/DL (ref 3.5–5.1)
ANION GAP SERPL CALCULATED.3IONS-SCNC: 19 MEQ/L (ref 8–16)
ANISOCYTOSIS: ABNORMAL
APTT: 70.5 SECONDS (ref 22–38)
APTT: 77.7 SECONDS (ref 22–38)
APTT: 78.5 SECONDS (ref 22–38)
BASOPHILS # BLD: 0.4 %
BASOPHILS ABSOLUTE: 0 THOU/MM3 (ref 0–0.1)
BUN BLDV-MCNC: 81 MG/DL (ref 7–22)
CALCIUM SERPL-MCNC: 8.7 MG/DL (ref 8.5–10.5)
CHLORIDE BLD-SCNC: 91 MEQ/L (ref 98–111)
CO2: 25 MEQ/L (ref 23–33)
CREAT SERPL-MCNC: 10.1 MG/DL (ref 0.4–1.2)
EKG ATRIAL RATE: 78 BPM
EKG P AXIS: 48 DEGREES
EKG P-R INTERVAL: 206 MS
EKG Q-T INTERVAL: 424 MS
EKG QRS DURATION: 108 MS
EKG QTC CALCULATION (BAZETT): 483 MS
EKG R AXIS: -40 DEGREES
EKG T AXIS: 65 DEGREES
EKG VENTRICULAR RATE: 78 BPM
EOSINOPHIL # BLD: 3.6 %
EOSINOPHILS ABSOLUTE: 0.2 THOU/MM3 (ref 0–0.4)
GFR SERPL CREATININE-BSD FRML MDRD: 7 ML/MIN/1.73M2
GLUCOSE BLD-MCNC: 102 MG/DL (ref 70–108)
GLUCOSE BLD-MCNC: 118 MG/DL (ref 70–108)
GLUCOSE BLD-MCNC: 82 MG/DL (ref 70–108)
HCT VFR BLD CALC: 27.2 % (ref 42–52)
HEMOGLOBIN: 9.1 GM/DL (ref 14–18)
LV EF: 63 %
LVEF MODALITY: NORMAL
LYMPHOCYTES # BLD: 10.9 %
LYMPHOCYTES ABSOLUTE: 0.6 THOU/MM3 (ref 1–4.8)
MAGNESIUM: 2.7 MG/DL (ref 1.6–2.4)
MCH RBC QN AUTO: 31.9 PG (ref 27–31)
MCHC RBC AUTO-ENTMCNC: 33.6 GM/DL (ref 33–37)
MCV RBC AUTO: 94.8 FL (ref 80–94)
MONOCYTES # BLD: 9.6 %
MONOCYTES ABSOLUTE: 0.5 THOU/MM3 (ref 0.4–1.3)
NUCLEATED RED BLOOD CELLS: 0 /100 WBC
PDW BLD-RTO: 14.7 % (ref 11.5–14.5)
PHOSPHORUS: 5.4 MG/DL (ref 2.4–4.7)
PLATELET # BLD: 123 THOU/MM3 (ref 130–400)
PMV BLD AUTO: 7.1 FL (ref 7.4–10.4)
POTASSIUM SERPL-SCNC: 4.7 MEQ/L (ref 3.5–5.2)
RBC # BLD: 2.87 MILL/MM3 (ref 4.7–6.1)
SEG NEUTROPHILS: 75.5 %
SEGMENTED NEUTROPHILS ABSOLUTE COUNT: 4.1 THOU/MM3 (ref 1.8–7.7)
SODIUM BLD-SCNC: 135 MEQ/L (ref 135–145)
WBC # BLD: 5.4 THOU/MM3 (ref 4.8–10.8)

## 2018-03-21 PROCEDURE — 6360000002 HC RX W HCPCS

## 2018-03-21 PROCEDURE — 2500000003 HC RX 250 WO HCPCS

## 2018-03-21 PROCEDURE — 74174 CTA ABD&PLVS W/CONTRAST: CPT

## 2018-03-21 PROCEDURE — 6370000000 HC RX 637 (ALT 250 FOR IP): Performed by: INTERNAL MEDICINE

## 2018-03-21 PROCEDURE — 93320 DOPPLER ECHO COMPLETE: CPT

## 2018-03-21 PROCEDURE — 99233 SBSQ HOSP IP/OBS HIGH 50: CPT | Performed by: HOSPITALIST

## 2018-03-21 PROCEDURE — 2580000003 HC RX 258: Performed by: HOSPITALIST

## 2018-03-21 PROCEDURE — 93005 ELECTROCARDIOGRAM TRACING: CPT | Performed by: INTERNAL MEDICINE

## 2018-03-21 PROCEDURE — 6360000002 HC RX W HCPCS: Performed by: INTERNAL MEDICINE

## 2018-03-21 PROCEDURE — 85025 COMPLETE CBC W/AUTO DIFF WBC: CPT

## 2018-03-21 PROCEDURE — 6370000000 HC RX 637 (ALT 250 FOR IP): Performed by: HOSPITALIST

## 2018-03-21 PROCEDURE — 93010 ELECTROCARDIOGRAM REPORT: CPT | Performed by: NUCLEAR MEDICINE

## 2018-03-21 PROCEDURE — 36415 COLL VENOUS BLD VENIPUNCTURE: CPT

## 2018-03-21 PROCEDURE — 5A2204Z RESTORATION OF CARDIAC RHYTHM, SINGLE: ICD-10-PCS | Performed by: INTERNAL MEDICINE

## 2018-03-21 PROCEDURE — 1200000003 HC TELEMETRY R&B

## 2018-03-21 PROCEDURE — 6370000000 HC RX 637 (ALT 250 FOR IP)

## 2018-03-21 PROCEDURE — 90935 HEMODIALYSIS ONE EVALUATION: CPT | Performed by: NURSE PRACTITIONER

## 2018-03-21 PROCEDURE — 85730 THROMBOPLASTIN TIME PARTIAL: CPT

## 2018-03-21 PROCEDURE — 6360000004 HC RX CONTRAST MEDICATION: Performed by: INTERNAL MEDICINE

## 2018-03-21 PROCEDURE — 92960 CARDIOVERSION ELECTRIC EXT: CPT

## 2018-03-21 PROCEDURE — 80069 RENAL FUNCTION PANEL: CPT

## 2018-03-21 PROCEDURE — 93312 ECHO TRANSESOPHAGEAL: CPT

## 2018-03-21 PROCEDURE — 93325 DOPPLER ECHO COLOR FLOW MAPG: CPT

## 2018-03-21 PROCEDURE — 82948 REAGENT STRIP/BLOOD GLUCOSE: CPT

## 2018-03-21 PROCEDURE — 83735 ASSAY OF MAGNESIUM: CPT

## 2018-03-21 PROCEDURE — 71275 CT ANGIOGRAPHY CHEST: CPT

## 2018-03-21 PROCEDURE — 90935 HEMODIALYSIS ONE EVALUATION: CPT

## 2018-03-21 RX ADMIN — IOPAMIDOL 80 ML: 755 INJECTION, SOLUTION INTRAVENOUS at 07:57

## 2018-03-21 RX ADMIN — CLONIDINE HYDROCHLORIDE 0.3 MG: 0.2 TABLET ORAL at 12:46

## 2018-03-21 RX ADMIN — SEVELAMER CARBONATE 1600 MG: 800 TABLET, FILM COATED ORAL at 17:22

## 2018-03-21 RX ADMIN — CLONIDINE HYDROCHLORIDE 0.3 MG: 0.2 TABLET ORAL at 19:52

## 2018-03-21 RX ADMIN — DOXAZOSIN 8 MG: 4 TABLET ORAL at 12:47

## 2018-03-21 RX ADMIN — VERAPAMIL HYDROCHLORIDE 240 MG: 240 TABLET, FILM COATED, EXTENDED RELEASE ORAL at 12:47

## 2018-03-21 RX ADMIN — Medication 10 ML: at 19:52

## 2018-03-21 RX ADMIN — HEPARIN SODIUM 21 UNITS/KG/HR: 10000 INJECTION, SOLUTION INTRAVENOUS at 14:56

## 2018-03-21 RX ADMIN — VITAMIN D, TAB 1000IU (100/BT) 2000 UNITS: 25 TAB at 19:53

## 2018-03-21 RX ADMIN — ATORVASTATIN CALCIUM 40 MG: 40 TABLET, FILM COATED ORAL at 19:52

## 2018-03-21 RX ADMIN — HEPARIN SODIUM 21 UNITS/KG/HR: 10000 INJECTION, SOLUTION INTRAVENOUS at 01:38

## 2018-03-21 RX ADMIN — MINOXIDIL 15 MG: 2.5 TABLET ORAL at 19:53

## 2018-03-21 RX ADMIN — FLUTICASONE PROPIONATE 1 SPRAY: 50 SPRAY, METERED NASAL at 19:52

## 2018-03-21 RX ADMIN — ISOSORBIDE MONONITRATE 120 MG: 60 TABLET ORAL at 04:44

## 2018-03-21 RX ADMIN — DOXAZOSIN 8 MG: 4 TABLET ORAL at 19:52

## 2018-03-22 ENCOUNTER — APPOINTMENT (OUTPATIENT)
Dept: NON INVASIVE DIAGNOSTICS | Age: 53
DRG: 252 | End: 2018-03-22
Attending: HOSPITALIST
Payer: MEDICARE

## 2018-03-22 LAB
ALBUMIN SERPL-MCNC: 4 G/DL (ref 3.5–5.1)
ANION GAP SERPL CALCULATED.3IONS-SCNC: 14 MEQ/L (ref 8–16)
APTT: 84.1 SECONDS (ref 22–38)
APTT: 90.1 SECONDS (ref 22–38)
BASOPHILS # BLD: 0.7 %
BASOPHILS ABSOLUTE: 0 THOU/MM3 (ref 0–0.1)
BUN BLDV-MCNC: 46 MG/DL (ref 7–22)
CALCIUM SERPL-MCNC: 9.2 MG/DL (ref 8.5–10.5)
CHLORIDE BLD-SCNC: 94 MEQ/L (ref 98–111)
CO2: 28 MEQ/L (ref 23–33)
CREAT SERPL-MCNC: 7.8 MG/DL (ref 0.4–1.2)
EOSINOPHIL # BLD: 3.2 %
EOSINOPHILS ABSOLUTE: 0.1 THOU/MM3 (ref 0–0.4)
GFR SERPL CREATININE-BSD FRML MDRD: 9 ML/MIN/1.73M2
GLUCOSE BLD-MCNC: 106 MG/DL (ref 70–108)
GLUCOSE BLD-MCNC: 108 MG/DL (ref 70–108)
GLUCOSE BLD-MCNC: 150 MG/DL (ref 70–108)
GLUCOSE BLD-MCNC: 168 MG/DL (ref 70–108)
GLUCOSE BLD-MCNC: 85 MG/DL (ref 70–108)
HCT VFR BLD CALC: 28 % (ref 42–52)
HEMOGLOBIN: 9.4 GM/DL (ref 14–18)
LYMPHOCYTES # BLD: 11 %
LYMPHOCYTES ABSOLUTE: 0.5 THOU/MM3 (ref 1–4.8)
MAGNESIUM: 2.6 MG/DL (ref 1.6–2.4)
MCH RBC QN AUTO: 32.1 PG (ref 27–31)
MCHC RBC AUTO-ENTMCNC: 33.7 GM/DL (ref 33–37)
MCV RBC AUTO: 95.3 FL (ref 80–94)
MONOCYTES # BLD: 10.2 %
MONOCYTES ABSOLUTE: 0.4 THOU/MM3 (ref 0.4–1.3)
NUCLEATED RED BLOOD CELLS: 0 /100 WBC
PDW BLD-RTO: 14 % (ref 11.5–14.5)
PHOSPHORUS: 4.7 MG/DL (ref 2.4–4.7)
PLATELET # BLD: 109 THOU/MM3 (ref 130–400)
PMV BLD AUTO: 8.2 FL (ref 7.4–10.4)
POTASSIUM SERPL-SCNC: 4.9 MEQ/L (ref 3.5–5.2)
RBC # BLD: 2.94 MILL/MM3 (ref 4.7–6.1)
SEG NEUTROPHILS: 74.9 %
SEGMENTED NEUTROPHILS ABSOLUTE COUNT: 3.3 THOU/MM3 (ref 1.8–7.7)
SODIUM BLD-SCNC: 136 MEQ/L (ref 135–145)
WBC # BLD: 4.4 THOU/MM3 (ref 4.8–10.8)

## 2018-03-22 PROCEDURE — 93017 CV STRESS TEST TRACING ONLY: CPT | Performed by: INTERNAL MEDICINE

## 2018-03-22 PROCEDURE — 78452 HT MUSCLE IMAGE SPECT MULT: CPT

## 2018-03-22 PROCEDURE — 80069 RENAL FUNCTION PANEL: CPT

## 2018-03-22 PROCEDURE — 99232 SBSQ HOSP IP/OBS MODERATE 35: CPT | Performed by: PHYSICIAN ASSISTANT

## 2018-03-22 PROCEDURE — 83735 ASSAY OF MAGNESIUM: CPT

## 2018-03-22 PROCEDURE — 6370000000 HC RX 637 (ALT 250 FOR IP): Performed by: INTERNAL MEDICINE

## 2018-03-22 PROCEDURE — 85025 COMPLETE CBC W/AUTO DIFF WBC: CPT

## 2018-03-22 PROCEDURE — 82948 REAGENT STRIP/BLOOD GLUCOSE: CPT

## 2018-03-22 PROCEDURE — A9500 TC99M SESTAMIBI: HCPCS | Performed by: HOSPITALIST

## 2018-03-22 PROCEDURE — 6360000002 HC RX W HCPCS

## 2018-03-22 PROCEDURE — 6370000000 HC RX 637 (ALT 250 FOR IP): Performed by: HOSPITALIST

## 2018-03-22 PROCEDURE — 6360000002 HC RX W HCPCS: Performed by: INTERNAL MEDICINE

## 2018-03-22 PROCEDURE — 1200000003 HC TELEMETRY R&B

## 2018-03-22 PROCEDURE — 36415 COLL VENOUS BLD VENIPUNCTURE: CPT

## 2018-03-22 PROCEDURE — 2580000003 HC RX 258: Performed by: HOSPITALIST

## 2018-03-22 PROCEDURE — 99232 SBSQ HOSP IP/OBS MODERATE 35: CPT | Performed by: HOSPITALIST

## 2018-03-22 PROCEDURE — 99232 SBSQ HOSP IP/OBS MODERATE 35: CPT | Performed by: INTERNAL MEDICINE

## 2018-03-22 PROCEDURE — 3430000000 HC RX DIAGNOSTIC RADIOPHARMACEUTICAL: Performed by: HOSPITALIST

## 2018-03-22 PROCEDURE — 6360000002 HC RX W HCPCS: Performed by: HOSPITALIST

## 2018-03-22 PROCEDURE — 85730 THROMBOPLASTIN TIME PARTIAL: CPT

## 2018-03-22 RX ADMIN — Medication 32.5 MILLICURIE: at 13:09

## 2018-03-22 RX ADMIN — SEVELAMER CARBONATE 1600 MG: 800 TABLET, FILM COATED ORAL at 16:39

## 2018-03-22 RX ADMIN — FLUTICASONE PROPIONATE 1 SPRAY: 50 SPRAY, METERED NASAL at 08:08

## 2018-03-22 RX ADMIN — SEVELAMER CARBONATE 1600 MG: 800 TABLET, FILM COATED ORAL at 08:08

## 2018-03-22 RX ADMIN — VITAMIN D, TAB 1000IU (100/BT) 2000 UNITS: 25 TAB at 19:59

## 2018-03-22 RX ADMIN — MINOXIDIL 15 MG: 2.5 TABLET ORAL at 08:08

## 2018-03-22 RX ADMIN — TRAZODONE HYDROCHLORIDE 50 MG: 50 TABLET ORAL at 20:09

## 2018-03-22 RX ADMIN — Medication 9.4 MILLICURIE: at 12:00

## 2018-03-22 RX ADMIN — DOXAZOSIN 8 MG: 4 TABLET ORAL at 19:59

## 2018-03-22 RX ADMIN — CLONIDINE HYDROCHLORIDE 0.3 MG: 0.2 TABLET ORAL at 14:02

## 2018-03-22 RX ADMIN — CLONIDINE HYDROCHLORIDE 0.3 MG: 0.2 TABLET ORAL at 19:59

## 2018-03-22 RX ADMIN — MULTIPLE VITAMINS W/ MINERALS TAB 1 TABLET: TAB at 08:08

## 2018-03-22 RX ADMIN — DOXAZOSIN 8 MG: 4 TABLET ORAL at 08:08

## 2018-03-22 RX ADMIN — FLUTICASONE PROPIONATE 1 SPRAY: 50 SPRAY, METERED NASAL at 19:59

## 2018-03-22 RX ADMIN — FAMOTIDINE 20 MG: 20 TABLET, FILM COATED ORAL at 08:08

## 2018-03-22 RX ADMIN — VITAMIN D, TAB 1000IU (100/BT) 2000 UNITS: 25 TAB at 08:08

## 2018-03-22 RX ADMIN — SERTRALINE 100 MG: 100 TABLET, FILM COATED ORAL at 08:08

## 2018-03-22 RX ADMIN — ISOSORBIDE MONONITRATE 120 MG: 60 TABLET ORAL at 08:08

## 2018-03-22 RX ADMIN — CLONIDINE HYDROCHLORIDE 0.3 MG: 0.2 TABLET ORAL at 08:08

## 2018-03-22 RX ADMIN — Medication 10 ML: at 19:59

## 2018-03-22 RX ADMIN — ONDANSETRON 4 MG: 4 TABLET, ORALLY DISINTEGRATING ORAL at 10:59

## 2018-03-22 RX ADMIN — VERAPAMIL HYDROCHLORIDE 240 MG: 240 TABLET, FILM COATED, EXTENDED RELEASE ORAL at 08:08

## 2018-03-22 RX ADMIN — ATORVASTATIN CALCIUM 40 MG: 40 TABLET, FILM COATED ORAL at 19:59

## 2018-03-22 RX ADMIN — HEPARIN SODIUM 21 UNITS/KG/HR: 10000 INJECTION, SOLUTION INTRAVENOUS at 03:24

## 2018-03-22 RX ADMIN — MINOXIDIL 15 MG: 2.5 TABLET ORAL at 19:59

## 2018-03-22 RX ADMIN — Medication 10 ML: at 08:08

## 2018-03-23 LAB
ALBUMIN SERPL-MCNC: 4.1 G/DL (ref 3.5–5.1)
ANION GAP SERPL CALCULATED.3IONS-SCNC: 12 MEQ/L (ref 8–16)
APTT: 30.9 SECONDS (ref 22–38)
APTT: 32.8 SECONDS (ref 22–38)
BASOPHILS # BLD: 0.4 %
BASOPHILS ABSOLUTE: 0 THOU/MM3 (ref 0–0.1)
BUN BLDV-MCNC: 27 MG/DL (ref 7–22)
CALCIUM SERPL-MCNC: 9 MG/DL (ref 8.5–10.5)
CHLORIDE BLD-SCNC: 94 MEQ/L (ref 98–111)
CO2: 30 MEQ/L (ref 23–33)
CREAT SERPL-MCNC: 5.2 MG/DL (ref 0.4–1.2)
EOSINOPHIL # BLD: 3.1 %
EOSINOPHILS ABSOLUTE: 0.1 THOU/MM3 (ref 0–0.4)
GFR SERPL CREATININE-BSD FRML MDRD: 14 ML/MIN/1.73M2
GLUCOSE BLD-MCNC: 92 MG/DL (ref 70–108)
GLUCOSE BLD-MCNC: 93 MG/DL (ref 70–108)
HCT VFR BLD CALC: 26.5 % (ref 42–52)
HEMOGLOBIN: 9.1 GM/DL (ref 14–18)
LYMPHOCYTES # BLD: 8.1 %
LYMPHOCYTES ABSOLUTE: 0.4 THOU/MM3 (ref 1–4.8)
MAGNESIUM: 2.3 MG/DL (ref 1.6–2.4)
MCH RBC QN AUTO: 32.5 PG (ref 27–31)
MCHC RBC AUTO-ENTMCNC: 34.5 GM/DL (ref 33–37)
MCV RBC AUTO: 94.3 FL (ref 80–94)
MONOCYTES # BLD: 10.9 %
MONOCYTES ABSOLUTE: 0.5 THOU/MM3 (ref 0.4–1.3)
NUCLEATED RED BLOOD CELLS: 0 /100 WBC
PDW BLD-RTO: 13.9 % (ref 11.5–14.5)
PHOSPHORUS: 2.7 MG/DL (ref 2.4–4.7)
PLATELET # BLD: 104 THOU/MM3 (ref 130–400)
PMV BLD AUTO: 7.3 FL (ref 7.4–10.4)
POTASSIUM SERPL-SCNC: 4 MEQ/L (ref 3.5–5.2)
RBC # BLD: 2.81 MILL/MM3 (ref 4.7–6.1)
SEG NEUTROPHILS: 77.5 %
SEGMENTED NEUTROPHILS ABSOLUTE COUNT: 3.6 THOU/MM3 (ref 1.8–7.7)
SODIUM BLD-SCNC: 136 MEQ/L (ref 135–145)
WBC # BLD: 4.6 THOU/MM3 (ref 4.8–10.8)

## 2018-03-23 PROCEDURE — 36415 COLL VENOUS BLD VENIPUNCTURE: CPT

## 2018-03-23 PROCEDURE — 99232 SBSQ HOSP IP/OBS MODERATE 35: CPT | Performed by: NURSE PRACTITIONER

## 2018-03-23 PROCEDURE — 6370000000 HC RX 637 (ALT 250 FOR IP): Performed by: INTERNAL MEDICINE

## 2018-03-23 PROCEDURE — 2580000003 HC RX 258: Performed by: HOSPITALIST

## 2018-03-23 PROCEDURE — 80069 RENAL FUNCTION PANEL: CPT

## 2018-03-23 PROCEDURE — 85025 COMPLETE CBC W/AUTO DIFF WBC: CPT

## 2018-03-23 PROCEDURE — 83735 ASSAY OF MAGNESIUM: CPT

## 2018-03-23 PROCEDURE — 82948 REAGENT STRIP/BLOOD GLUCOSE: CPT

## 2018-03-23 PROCEDURE — 6370000000 HC RX 637 (ALT 250 FOR IP): Performed by: HOSPITALIST

## 2018-03-23 PROCEDURE — 85730 THROMBOPLASTIN TIME PARTIAL: CPT

## 2018-03-23 PROCEDURE — 99232 SBSQ HOSP IP/OBS MODERATE 35: CPT | Performed by: HOSPITALIST

## 2018-03-23 PROCEDURE — 90935 HEMODIALYSIS ONE EVALUATION: CPT

## 2018-03-23 PROCEDURE — 6370000000 HC RX 637 (ALT 250 FOR IP): Performed by: PHYSICIAN ASSISTANT

## 2018-03-23 PROCEDURE — 1200000003 HC TELEMETRY R&B

## 2018-03-23 RX ORDER — TRAZODONE HYDROCHLORIDE 50 MG/1
50 TABLET ORAL NIGHTLY PRN
Qty: 7 TABLET | Refills: 0 | Status: SHIPPED | OUTPATIENT
Start: 2018-03-23

## 2018-03-23 RX ORDER — ISOSORBIDE MONONITRATE 120 MG/1
120 TABLET, EXTENDED RELEASE ORAL DAILY
Qty: 30 TABLET | Refills: 3 | Status: SHIPPED | OUTPATIENT
Start: 2018-03-24 | End: 2018-03-23

## 2018-03-23 RX ORDER — ASPIRIN 81 MG/1
81 TABLET ORAL DAILY
Status: DISCONTINUED | OUTPATIENT
Start: 2018-03-23 | End: 2018-03-27 | Stop reason: HOSPADM

## 2018-03-23 RX ORDER — ASPIRIN 81 MG/1
81 TABLET ORAL DAILY
Qty: 30 TABLET | Refills: 1 | Status: SHIPPED | OUTPATIENT
Start: 2018-03-23 | End: 2018-03-23

## 2018-03-23 RX ORDER — MINOXIDIL 10 MG/1
15 TABLET ORAL EVERY 12 HOURS
Qty: 21 TABLET | Refills: 0 | Status: ON HOLD | OUTPATIENT
Start: 2018-03-23 | End: 2020-01-28

## 2018-03-23 RX ORDER — FAMOTIDINE 20 MG/1
20 TABLET, FILM COATED ORAL DAILY
Qty: 60 TABLET | Refills: 3 | Status: ON HOLD | OUTPATIENT
Start: 2018-03-23 | End: 2020-01-28 | Stop reason: SDUPTHER

## 2018-03-23 RX ORDER — DOXAZOSIN 8 MG/1
8 TABLET ORAL EVERY 12 HOURS SCHEDULED
Qty: 30 TABLET | Refills: 3 | Status: SHIPPED | OUTPATIENT
Start: 2018-03-23 | End: 2018-03-23

## 2018-03-23 RX ORDER — SEVELAMER CARBONATE 800 MG/1
1600 TABLET, FILM COATED ORAL
Qty: 42 TABLET | Refills: 0 | Status: ON HOLD | OUTPATIENT
Start: 2018-03-23 | End: 2019-11-30

## 2018-03-23 RX ORDER — ASPIRIN 81 MG/1
81 TABLET ORAL DAILY
Qty: 7 TABLET | Refills: 0 | Status: SHIPPED | OUTPATIENT
Start: 2018-03-23

## 2018-03-23 RX ORDER — SERTRALINE HYDROCHLORIDE 100 MG/1
100 TABLET, FILM COATED ORAL DAILY
Qty: 7 TABLET | Refills: 0 | Status: ON HOLD | OUTPATIENT
Start: 2018-03-23 | End: 2019-11-30

## 2018-03-23 RX ORDER — MINOXIDIL 2.5 MG/1
15 TABLET ORAL EVERY 12 HOURS
Qty: 30 TABLET | Refills: 3 | Status: SHIPPED | OUTPATIENT
Start: 2018-03-23 | End: 2018-03-23

## 2018-03-23 RX ORDER — HYDROXYZINE PAMOATE 50 MG/1
50 CAPSULE ORAL 3 TIMES DAILY PRN
Qty: 21 CAPSULE | Refills: 0 | Status: SHIPPED | OUTPATIENT
Start: 2018-03-23 | End: 2018-03-23

## 2018-03-23 RX ORDER — TRAZODONE HYDROCHLORIDE 50 MG/1
50 TABLET ORAL NIGHTLY PRN
Qty: 30 TABLET | Refills: 0 | Status: SHIPPED | OUTPATIENT
Start: 2018-03-23 | End: 2018-03-23

## 2018-03-23 RX ORDER — SEVELAMER CARBONATE 800 MG/1
1600 TABLET, FILM COATED ORAL
Qty: 90 TABLET | Refills: 3 | Status: SHIPPED | OUTPATIENT
Start: 2018-03-23 | End: 2018-03-23

## 2018-03-23 RX ORDER — ISOSORBIDE MONONITRATE 120 MG/1
120 TABLET, EXTENDED RELEASE ORAL DAILY
Qty: 7 TABLET | Refills: 0 | Status: ON HOLD | OUTPATIENT
Start: 2018-03-24 | End: 2022-10-15 | Stop reason: HOSPADM

## 2018-03-23 RX ORDER — CLONIDINE HYDROCHLORIDE 0.3 MG/1
0.3 TABLET ORAL 3 TIMES DAILY
Qty: 21 TABLET | Refills: 0 | Status: ON HOLD | OUTPATIENT
Start: 2018-03-23 | End: 2020-01-28

## 2018-03-23 RX ORDER — VERAPAMIL HYDROCHLORIDE 240 MG/1
240 TABLET, FILM COATED, EXTENDED RELEASE ORAL DAILY
Qty: 30 TABLET | Refills: 3 | Status: SHIPPED | OUTPATIENT
Start: 2018-03-24 | End: 2018-03-23

## 2018-03-23 RX ORDER — CLONIDINE HYDROCHLORIDE 0.3 MG/1
0.3 TABLET ORAL 3 TIMES DAILY
Qty: 60 TABLET | Refills: 3 | Status: SHIPPED | OUTPATIENT
Start: 2018-03-23 | End: 2018-03-23

## 2018-03-23 RX ORDER — ATORVASTATIN CALCIUM 40 MG/1
40 TABLET, FILM COATED ORAL NIGHTLY
Qty: 7 TABLET | Refills: 0 | Status: SHIPPED | OUTPATIENT
Start: 2018-03-23

## 2018-03-23 RX ORDER — ATORVASTATIN CALCIUM 40 MG/1
40 TABLET, FILM COATED ORAL NIGHTLY
Qty: 30 TABLET | Refills: 3 | Status: SHIPPED | OUTPATIENT
Start: 2018-03-23 | End: 2018-03-23

## 2018-03-23 RX ORDER — HYDROXYZINE PAMOATE 50 MG/1
50 CAPSULE ORAL 3 TIMES DAILY PRN
Qty: 21 CAPSULE | Refills: 0 | Status: SHIPPED | OUTPATIENT
Start: 2018-03-23 | End: 2018-03-30

## 2018-03-23 RX ORDER — VERAPAMIL HYDROCHLORIDE 240 MG/1
240 TABLET, FILM COATED, EXTENDED RELEASE ORAL DAILY
Qty: 7 TABLET | Refills: 0 | Status: ON HOLD | OUTPATIENT
Start: 2018-03-24 | End: 2020-02-25 | Stop reason: DRUGHIGH

## 2018-03-23 RX ORDER — SERTRALINE HYDROCHLORIDE 100 MG/1
100 TABLET, FILM COATED ORAL DAILY
Qty: 30 TABLET | Refills: 3 | Status: SHIPPED | OUTPATIENT
Start: 2018-03-23 | End: 2018-03-23

## 2018-03-23 RX ORDER — DOXAZOSIN 8 MG/1
8 TABLET ORAL EVERY 12 HOURS SCHEDULED
Qty: 14 TABLET | Refills: 0 | Status: ON HOLD | OUTPATIENT
Start: 2018-03-23 | End: 2020-01-28

## 2018-03-23 RX ADMIN — VITAMIN D, TAB 1000IU (100/BT) 2000 UNITS: 25 TAB at 13:00

## 2018-03-23 RX ADMIN — DOXAZOSIN 8 MG: 4 TABLET ORAL at 06:21

## 2018-03-23 RX ADMIN — TRAZODONE HYDROCHLORIDE 50 MG: 50 TABLET ORAL at 22:42

## 2018-03-23 RX ADMIN — CLONIDINE HYDROCHLORIDE 0.3 MG: 0.2 TABLET ORAL at 13:00

## 2018-03-23 RX ADMIN — FLUTICASONE PROPIONATE 1 SPRAY: 50 SPRAY, METERED NASAL at 13:00

## 2018-03-23 RX ADMIN — SEVELAMER CARBONATE 1600 MG: 800 TABLET, FILM COATED ORAL at 16:16

## 2018-03-23 RX ADMIN — CLONIDINE HYDROCHLORIDE 0.3 MG: 0.2 TABLET ORAL at 21:12

## 2018-03-23 RX ADMIN — SERTRALINE 100 MG: 100 TABLET, FILM COATED ORAL at 13:01

## 2018-03-23 RX ADMIN — MINOXIDIL 15 MG: 2.5 TABLET ORAL at 22:41

## 2018-03-23 RX ADMIN — MAGNESIUM HYDROXIDE 30 ML: 400 SUSPENSION ORAL at 23:48

## 2018-03-23 RX ADMIN — Medication 10 ML: at 21:14

## 2018-03-23 RX ADMIN — CLONIDINE HYDROCHLORIDE 0.3 MG: 0.2 TABLET ORAL at 06:21

## 2018-03-23 RX ADMIN — MINOXIDIL 15 MG: 2.5 TABLET ORAL at 06:22

## 2018-03-23 RX ADMIN — ATORVASTATIN CALCIUM 40 MG: 40 TABLET, FILM COATED ORAL at 21:12

## 2018-03-23 RX ADMIN — VERAPAMIL HYDROCHLORIDE 240 MG: 240 TABLET, FILM COATED, EXTENDED RELEASE ORAL at 06:21

## 2018-03-23 RX ADMIN — DOXAZOSIN 8 MG: 4 TABLET ORAL at 21:13

## 2018-03-23 RX ADMIN — FAMOTIDINE 20 MG: 20 TABLET, FILM COATED ORAL at 13:00

## 2018-03-23 RX ADMIN — SEVELAMER CARBONATE 1600 MG: 800 TABLET, FILM COATED ORAL at 13:01

## 2018-03-23 RX ADMIN — MULTIPLE VITAMINS W/ MINERALS TAB 1 TABLET: TAB at 13:01

## 2018-03-23 RX ADMIN — VITAMIN D, TAB 1000IU (100/BT) 2000 UNITS: 25 TAB at 21:13

## 2018-03-23 RX ADMIN — ISOSORBIDE MONONITRATE 120 MG: 60 TABLET ORAL at 06:21

## 2018-03-23 RX ADMIN — ASPIRIN 81 MG: 81 TABLET, COATED ORAL at 13:01

## 2018-03-23 RX ADMIN — FLUTICASONE PROPIONATE 1 SPRAY: 50 SPRAY, METERED NASAL at 21:14

## 2018-03-24 LAB
ALBUMIN SERPL-MCNC: 4 G/DL (ref 3.5–5.1)
ANION GAP SERPL CALCULATED.3IONS-SCNC: 14 MEQ/L (ref 8–16)
BASOPHILS # BLD: 0.5 %
BASOPHILS ABSOLUTE: 0 THOU/MM3 (ref 0–0.1)
BUN BLDV-MCNC: 46 MG/DL (ref 7–22)
CALCIUM SERPL-MCNC: 9.2 MG/DL (ref 8.5–10.5)
CHLORIDE BLD-SCNC: 94 MEQ/L (ref 98–111)
CO2: 28 MEQ/L (ref 23–33)
CREAT SERPL-MCNC: 7.3 MG/DL (ref 0.4–1.2)
EOSINOPHIL # BLD: 2.9 %
EOSINOPHILS ABSOLUTE: 0.1 THOU/MM3 (ref 0–0.4)
GFR SERPL CREATININE-BSD FRML MDRD: 10 ML/MIN/1.73M2
GLUCOSE BLD-MCNC: 130 MG/DL (ref 70–108)
HCT VFR BLD CALC: 27.5 % (ref 42–52)
HEMOGLOBIN: 9.5 GM/DL (ref 14–18)
LYMPHOCYTES # BLD: 9.3 %
LYMPHOCYTES ABSOLUTE: 0.5 THOU/MM3 (ref 1–4.8)
MAGNESIUM: 2.7 MG/DL (ref 1.6–2.4)
MCH RBC QN AUTO: 32.8 PG (ref 27–31)
MCHC RBC AUTO-ENTMCNC: 34.5 GM/DL (ref 33–37)
MCV RBC AUTO: 95.2 FL (ref 80–94)
MONOCYTES # BLD: 8.7 %
MONOCYTES ABSOLUTE: 0.4 THOU/MM3 (ref 0.4–1.3)
NUCLEATED RED BLOOD CELLS: 0 /100 WBC
PDW BLD-RTO: 13.8 % (ref 11.5–14.5)
PHOSPHORUS: 4.1 MG/DL (ref 2.4–4.7)
PLATELET # BLD: 107 THOU/MM3 (ref 130–400)
PMV BLD AUTO: 7.2 FL (ref 7.4–10.4)
POTASSIUM SERPL-SCNC: 5.3 MEQ/L (ref 3.5–5.2)
RBC # BLD: 2.89 MILL/MM3 (ref 4.7–6.1)
SEG NEUTROPHILS: 78.6 %
SEGMENTED NEUTROPHILS ABSOLUTE COUNT: 3.9 THOU/MM3 (ref 1.8–7.7)
SODIUM BLD-SCNC: 136 MEQ/L (ref 135–145)
WBC # BLD: 5 THOU/MM3 (ref 4.8–10.8)

## 2018-03-24 PROCEDURE — 99231 SBSQ HOSP IP/OBS SF/LOW 25: CPT | Performed by: INTERNAL MEDICINE

## 2018-03-24 PROCEDURE — 80069 RENAL FUNCTION PANEL: CPT

## 2018-03-24 PROCEDURE — 83735 ASSAY OF MAGNESIUM: CPT

## 2018-03-24 PROCEDURE — 6370000000 HC RX 637 (ALT 250 FOR IP): Performed by: INTERNAL MEDICINE

## 2018-03-24 PROCEDURE — 6370000000 HC RX 637 (ALT 250 FOR IP): Performed by: HOSPITALIST

## 2018-03-24 PROCEDURE — 99232 SBSQ HOSP IP/OBS MODERATE 35: CPT | Performed by: HOSPITALIST

## 2018-03-24 PROCEDURE — 85025 COMPLETE CBC W/AUTO DIFF WBC: CPT

## 2018-03-24 PROCEDURE — 6370000000 HC RX 637 (ALT 250 FOR IP): Performed by: PHYSICIAN ASSISTANT

## 2018-03-24 PROCEDURE — 36415 COLL VENOUS BLD VENIPUNCTURE: CPT

## 2018-03-24 PROCEDURE — 1200000003 HC TELEMETRY R&B

## 2018-03-24 RX ADMIN — VITAMIN D, TAB 1000IU (100/BT) 2000 UNITS: 25 TAB at 21:11

## 2018-03-24 RX ADMIN — DOXAZOSIN 8 MG: 4 TABLET ORAL at 21:11

## 2018-03-24 RX ADMIN — CLONIDINE HYDROCHLORIDE 0.3 MG: 0.2 TABLET ORAL at 09:18

## 2018-03-24 RX ADMIN — ISOSORBIDE MONONITRATE 120 MG: 60 TABLET ORAL at 09:18

## 2018-03-24 RX ADMIN — FAMOTIDINE 20 MG: 20 TABLET, FILM COATED ORAL at 09:18

## 2018-03-24 RX ADMIN — TRAZODONE HYDROCHLORIDE 50 MG: 50 TABLET ORAL at 21:11

## 2018-03-24 RX ADMIN — DOXAZOSIN 8 MG: 4 TABLET ORAL at 09:17

## 2018-03-24 RX ADMIN — CLONIDINE HYDROCHLORIDE 0.3 MG: 0.2 TABLET ORAL at 14:44

## 2018-03-24 RX ADMIN — FLUTICASONE PROPIONATE 1 SPRAY: 50 SPRAY, METERED NASAL at 09:18

## 2018-03-24 RX ADMIN — FLUTICASONE PROPIONATE 1 SPRAY: 50 SPRAY, METERED NASAL at 21:11

## 2018-03-24 RX ADMIN — SEVELAMER CARBONATE 1600 MG: 800 TABLET, FILM COATED ORAL at 15:44

## 2018-03-24 RX ADMIN — SERTRALINE 100 MG: 100 TABLET, FILM COATED ORAL at 09:18

## 2018-03-24 RX ADMIN — ASPIRIN 81 MG: 81 TABLET, COATED ORAL at 09:17

## 2018-03-24 RX ADMIN — ATORVASTATIN CALCIUM 40 MG: 40 TABLET, FILM COATED ORAL at 21:11

## 2018-03-24 RX ADMIN — SEVELAMER CARBONATE 1600 MG: 800 TABLET, FILM COATED ORAL at 11:48

## 2018-03-24 RX ADMIN — SEVELAMER CARBONATE 1600 MG: 800 TABLET, FILM COATED ORAL at 09:18

## 2018-03-24 RX ADMIN — MINOXIDIL 15 MG: 2.5 TABLET ORAL at 09:18

## 2018-03-24 RX ADMIN — HYDROXYZINE PAMOATE 50 MG: 50 CAPSULE ORAL at 21:11

## 2018-03-24 RX ADMIN — VITAMIN D, TAB 1000IU (100/BT) 2000 UNITS: 25 TAB at 09:17

## 2018-03-24 RX ADMIN — MINOXIDIL 15 MG: 2.5 TABLET ORAL at 21:10

## 2018-03-24 RX ADMIN — VERAPAMIL HYDROCHLORIDE 240 MG: 240 TABLET, FILM COATED, EXTENDED RELEASE ORAL at 09:17

## 2018-03-24 RX ADMIN — CLONIDINE HYDROCHLORIDE 0.3 MG: 0.2 TABLET ORAL at 21:11

## 2018-03-24 RX ADMIN — MULTIPLE VITAMINS W/ MINERALS TAB 1 TABLET: TAB at 09:17

## 2018-03-25 LAB
ALBUMIN SERPL-MCNC: 3.9 G/DL (ref 3.5–5.1)
ANION GAP SERPL CALCULATED.3IONS-SCNC: 15 MEQ/L (ref 8–16)
BASOPHILS # BLD: 0.5 %
BASOPHILS ABSOLUTE: 0 THOU/MM3 (ref 0–0.1)
BUN BLDV-MCNC: 67 MG/DL (ref 7–22)
CALCIUM SERPL-MCNC: 9.3 MG/DL (ref 8.5–10.5)
CHLORIDE BLD-SCNC: 95 MEQ/L (ref 98–111)
CO2: 26 MEQ/L (ref 23–33)
CREAT SERPL-MCNC: 9.8 MG/DL (ref 0.4–1.2)
EOSINOPHIL # BLD: 4.2 %
EOSINOPHILS ABSOLUTE: 0.2 THOU/MM3 (ref 0–0.4)
GFR SERPL CREATININE-BSD FRML MDRD: 7 ML/MIN/1.73M2
GLUCOSE BLD-MCNC: 91 MG/DL (ref 70–108)
HCT VFR BLD CALC: 26.9 % (ref 42–52)
HEMOGLOBIN: 9.1 GM/DL (ref 14–18)
LYMPHOCYTES # BLD: 12.9 %
LYMPHOCYTES ABSOLUTE: 0.7 THOU/MM3 (ref 1–4.8)
MAGNESIUM: 2.9 MG/DL (ref 1.6–2.4)
MCH RBC QN AUTO: 32.1 PG (ref 27–31)
MCHC RBC AUTO-ENTMCNC: 33.8 GM/DL (ref 33–37)
MCV RBC AUTO: 95 FL (ref 80–94)
MONOCYTES # BLD: 9.9 %
MONOCYTES ABSOLUTE: 0.5 THOU/MM3 (ref 0.4–1.3)
NUCLEATED RED BLOOD CELLS: 0 /100 WBC
PDW BLD-RTO: 13.7 % (ref 11.5–14.5)
PHOSPHORUS: 4.3 MG/DL (ref 2.4–4.7)
PLATELET # BLD: 125 THOU/MM3 (ref 130–400)
PMV BLD AUTO: 7.7 FL (ref 7.4–10.4)
POTASSIUM SERPL-SCNC: 5.5 MEQ/L (ref 3.5–5.2)
RBC # BLD: 2.84 MILL/MM3 (ref 4.7–6.1)
SEG NEUTROPHILS: 72.5 %
SEGMENTED NEUTROPHILS ABSOLUTE COUNT: 3.9 THOU/MM3 (ref 1.8–7.7)
SODIUM BLD-SCNC: 136 MEQ/L (ref 135–145)
WBC # BLD: 5.4 THOU/MM3 (ref 4.8–10.8)

## 2018-03-25 PROCEDURE — 83735 ASSAY OF MAGNESIUM: CPT

## 2018-03-25 PROCEDURE — 6370000000 HC RX 637 (ALT 250 FOR IP): Performed by: INTERNAL MEDICINE

## 2018-03-25 PROCEDURE — 85025 COMPLETE CBC W/AUTO DIFF WBC: CPT

## 2018-03-25 PROCEDURE — 36415 COLL VENOUS BLD VENIPUNCTURE: CPT

## 2018-03-25 PROCEDURE — 80069 RENAL FUNCTION PANEL: CPT

## 2018-03-25 PROCEDURE — 6370000000 HC RX 637 (ALT 250 FOR IP): Performed by: HOSPITALIST

## 2018-03-25 PROCEDURE — 1200000003 HC TELEMETRY R&B

## 2018-03-25 PROCEDURE — 6370000000 HC RX 637 (ALT 250 FOR IP): Performed by: PHYSICIAN ASSISTANT

## 2018-03-25 PROCEDURE — 99232 SBSQ HOSP IP/OBS MODERATE 35: CPT | Performed by: HOSPITALIST

## 2018-03-25 RX ADMIN — SEVELAMER CARBONATE 1600 MG: 800 TABLET, FILM COATED ORAL at 12:15

## 2018-03-25 RX ADMIN — VERAPAMIL HYDROCHLORIDE 240 MG: 240 TABLET, FILM COATED, EXTENDED RELEASE ORAL at 08:09

## 2018-03-25 RX ADMIN — DOXAZOSIN 8 MG: 4 TABLET ORAL at 08:09

## 2018-03-25 RX ADMIN — SEVELAMER CARBONATE 1600 MG: 800 TABLET, FILM COATED ORAL at 16:08

## 2018-03-25 RX ADMIN — SEVELAMER CARBONATE 1600 MG: 800 TABLET, FILM COATED ORAL at 08:08

## 2018-03-25 RX ADMIN — ATORVASTATIN CALCIUM 40 MG: 40 TABLET, FILM COATED ORAL at 20:11

## 2018-03-25 RX ADMIN — CLONIDINE HYDROCHLORIDE 0.3 MG: 0.2 TABLET ORAL at 08:09

## 2018-03-25 RX ADMIN — MINOXIDIL 15 MG: 2.5 TABLET ORAL at 08:09

## 2018-03-25 RX ADMIN — DOXAZOSIN 8 MG: 4 TABLET ORAL at 20:10

## 2018-03-25 RX ADMIN — ISOSORBIDE MONONITRATE 120 MG: 60 TABLET ORAL at 08:08

## 2018-03-25 RX ADMIN — FAMOTIDINE 20 MG: 20 TABLET, FILM COATED ORAL at 08:09

## 2018-03-25 RX ADMIN — VITAMIN D, TAB 1000IU (100/BT) 2000 UNITS: 25 TAB at 20:11

## 2018-03-25 RX ADMIN — MINOXIDIL 15 MG: 2.5 TABLET ORAL at 20:11

## 2018-03-25 RX ADMIN — FLUTICASONE PROPIONATE 1 SPRAY: 50 SPRAY, METERED NASAL at 08:10

## 2018-03-25 RX ADMIN — VITAMIN D, TAB 1000IU (100/BT) 2000 UNITS: 25 TAB at 08:09

## 2018-03-25 RX ADMIN — FLUTICASONE PROPIONATE 1 SPRAY: 50 SPRAY, METERED NASAL at 20:11

## 2018-03-25 RX ADMIN — MULTIPLE VITAMINS W/ MINERALS TAB 1 TABLET: TAB at 08:08

## 2018-03-25 RX ADMIN — ASPIRIN 81 MG: 81 TABLET, COATED ORAL at 08:08

## 2018-03-25 RX ADMIN — SERTRALINE 100 MG: 100 TABLET, FILM COATED ORAL at 08:08

## 2018-03-26 ENCOUNTER — TELEPHONE (OUTPATIENT)
Dept: CARDIOLOGY CLINIC | Age: 53
End: 2018-03-26

## 2018-03-26 DIAGNOSIS — I48.92 ATRIAL FLUTTER WITH RAPID VENTRICULAR RESPONSE (HCC): Primary | ICD-10-CM

## 2018-03-26 LAB
ALBUMIN SERPL-MCNC: 4 G/DL (ref 3.5–5.1)
ANION GAP SERPL CALCULATED.3IONS-SCNC: 17 MEQ/L (ref 8–16)
BASOPHILS # BLD: 0.8 %
BASOPHILS ABSOLUTE: 0 THOU/MM3 (ref 0–0.1)
BUN BLDV-MCNC: 86 MG/DL (ref 7–22)
CALCIUM SERPL-MCNC: 9.4 MG/DL (ref 8.5–10.5)
CHLORIDE BLD-SCNC: 96 MEQ/L (ref 98–111)
CO2: 25 MEQ/L (ref 23–33)
CREAT SERPL-MCNC: 11.6 MG/DL (ref 0.4–1.2)
EOSINOPHIL # BLD: 3.6 %
EOSINOPHILS ABSOLUTE: 0.2 THOU/MM3 (ref 0–0.4)
GFR SERPL CREATININE-BSD FRML MDRD: 6 ML/MIN/1.73M2
GLUCOSE BLD-MCNC: 91 MG/DL (ref 70–108)
HAV IGM SER IA-ACNC: NEGATIVE
HCT VFR BLD CALC: 26.9 % (ref 42–52)
HEMOGLOBIN: 9.2 GM/DL (ref 14–18)
HEPATITIS B CORE IGM ANTIBODY: NEGATIVE
HEPATITIS B SURFACE ANTIGEN: NEGATIVE
HEPATITIS C ANTIBODY: NEGATIVE
LYMPHOCYTES # BLD: 12.4 %
LYMPHOCYTES ABSOLUTE: 0.7 THOU/MM3 (ref 1–4.8)
MAGNESIUM: 3.2 MG/DL (ref 1.6–2.4)
MCH RBC QN AUTO: 32.4 PG (ref 27–31)
MCHC RBC AUTO-ENTMCNC: 34.2 GM/DL (ref 33–37)
MCV RBC AUTO: 94.9 FL (ref 80–94)
MONOCYTES # BLD: 8 %
MONOCYTES ABSOLUTE: 0.4 THOU/MM3 (ref 0.4–1.3)
NUCLEATED RED BLOOD CELLS: 0 /100 WBC
PDW BLD-RTO: 13.6 % (ref 11.5–14.5)
PHOSPHORUS: 4.8 MG/DL (ref 2.4–4.7)
PLATELET # BLD: 118 THOU/MM3 (ref 130–400)
PMV BLD AUTO: 7.6 FL (ref 7.4–10.4)
POTASSIUM SERPL-SCNC: 6.2 MEQ/L (ref 3.5–5.2)
RBC # BLD: 2.84 MILL/MM3 (ref 4.7–6.1)
SEG NEUTROPHILS: 75.2 %
SEGMENTED NEUTROPHILS ABSOLUTE COUNT: 4 THOU/MM3 (ref 1.8–7.7)
SODIUM BLD-SCNC: 138 MEQ/L (ref 135–145)
WBC # BLD: 5.3 THOU/MM3 (ref 4.8–10.8)

## 2018-03-26 PROCEDURE — 6370000000 HC RX 637 (ALT 250 FOR IP): Performed by: PHYSICIAN ASSISTANT

## 2018-03-26 PROCEDURE — 6370000000 HC RX 637 (ALT 250 FOR IP): Performed by: INTERNAL MEDICINE

## 2018-03-26 PROCEDURE — 6370000000 HC RX 637 (ALT 250 FOR IP): Performed by: HOSPITALIST

## 2018-03-26 PROCEDURE — 83735 ASSAY OF MAGNESIUM: CPT

## 2018-03-26 PROCEDURE — 80069 RENAL FUNCTION PANEL: CPT

## 2018-03-26 PROCEDURE — 36415 COLL VENOUS BLD VENIPUNCTURE: CPT

## 2018-03-26 PROCEDURE — 90935 HEMODIALYSIS ONE EVALUATION: CPT | Performed by: NURSE PRACTITIONER

## 2018-03-26 PROCEDURE — 80074 ACUTE HEPATITIS PANEL: CPT

## 2018-03-26 PROCEDURE — 99232 SBSQ HOSP IP/OBS MODERATE 35: CPT | Performed by: HOSPITALIST

## 2018-03-26 PROCEDURE — 85025 COMPLETE CBC W/AUTO DIFF WBC: CPT

## 2018-03-26 PROCEDURE — 6370000000 HC RX 637 (ALT 250 FOR IP): Performed by: NURSE PRACTITIONER

## 2018-03-26 PROCEDURE — 6360000002 HC RX W HCPCS: Performed by: NURSE PRACTITIONER

## 2018-03-26 PROCEDURE — 1200000003 HC TELEMETRY R&B

## 2018-03-26 PROCEDURE — 90935 HEMODIALYSIS ONE EVALUATION: CPT

## 2018-03-26 RX ORDER — CEPHALEXIN 250 MG/1
250 CAPSULE ORAL EVERY 12 HOURS SCHEDULED
Status: DISCONTINUED | OUTPATIENT
Start: 2018-03-26 | End: 2018-03-27 | Stop reason: HOSPADM

## 2018-03-26 RX ADMIN — ACETAMINOPHEN 650 MG: 325 TABLET ORAL at 06:59

## 2018-03-26 RX ADMIN — CLONIDINE HYDROCHLORIDE 0.3 MG: 0.2 TABLET ORAL at 05:00

## 2018-03-26 RX ADMIN — CEPHALEXIN 250 MG: 250 CAPSULE ORAL at 20:22

## 2018-03-26 RX ADMIN — ISOSORBIDE MONONITRATE 120 MG: 60 TABLET ORAL at 06:28

## 2018-03-26 RX ADMIN — MINOXIDIL 15 MG: 2.5 TABLET ORAL at 06:28

## 2018-03-26 RX ADMIN — CLONIDINE HYDROCHLORIDE 0.3 MG: 0.2 TABLET ORAL at 13:18

## 2018-03-26 RX ADMIN — VITAMIN D, TAB 1000IU (100/BT) 2000 UNITS: 25 TAB at 13:17

## 2018-03-26 RX ADMIN — ASPIRIN 81 MG: 81 TABLET, COATED ORAL at 13:18

## 2018-03-26 RX ADMIN — VERAPAMIL HYDROCHLORIDE 240 MG: 240 TABLET, FILM COATED, EXTENDED RELEASE ORAL at 06:28

## 2018-03-26 RX ADMIN — DOXAZOSIN 8 MG: 4 TABLET ORAL at 06:28

## 2018-03-26 RX ADMIN — VITAMIN D, TAB 1000IU (100/BT) 2000 UNITS: 25 TAB at 20:22

## 2018-03-26 RX ADMIN — DARBEPOETIN ALFA 60 MCG: 60 INJECTION, SOLUTION INTRAVENOUS; SUBCUTANEOUS at 11:01

## 2018-03-26 RX ADMIN — MULTIPLE VITAMINS W/ MINERALS TAB 1 TABLET: TAB at 13:17

## 2018-03-26 RX ADMIN — SERTRALINE 100 MG: 100 TABLET, FILM COATED ORAL at 13:18

## 2018-03-26 RX ADMIN — CEPHALEXIN 250 MG: 250 CAPSULE ORAL at 13:18

## 2018-03-26 RX ADMIN — ATORVASTATIN CALCIUM 40 MG: 40 TABLET, FILM COATED ORAL at 20:23

## 2018-03-26 RX ADMIN — SEVELAMER CARBONATE 1600 MG: 800 TABLET, FILM COATED ORAL at 13:18

## 2018-03-26 RX ADMIN — MINOXIDIL 15 MG: 2.5 TABLET ORAL at 21:23

## 2018-03-26 RX ADMIN — DOXAZOSIN 8 MG: 4 TABLET ORAL at 20:23

## 2018-03-26 RX ADMIN — FLUTICASONE PROPIONATE 1 SPRAY: 50 SPRAY, METERED NASAL at 13:17

## 2018-03-26 RX ADMIN — CLONIDINE HYDROCHLORIDE 0.3 MG: 0.2 TABLET ORAL at 20:23

## 2018-03-26 RX ADMIN — SEVELAMER CARBONATE 1600 MG: 800 TABLET, FILM COATED ORAL at 16:23

## 2018-03-26 RX ADMIN — FLUTICASONE PROPIONATE 1 SPRAY: 50 SPRAY, METERED NASAL at 20:22

## 2018-03-26 RX ADMIN — FAMOTIDINE 20 MG: 20 TABLET, FILM COATED ORAL at 13:18

## 2018-03-26 ASSESSMENT — PAIN SCALES - GENERAL: PAINLEVEL_OUTOF10: 5

## 2018-03-27 ENCOUNTER — TELEPHONE (OUTPATIENT)
Dept: CARDIOLOGY CLINIC | Age: 53
End: 2018-03-27

## 2018-03-27 ENCOUNTER — TELEPHONE (OUTPATIENT)
Dept: INTERNAL MEDICINE CLINIC | Age: 53
End: 2018-03-27

## 2018-03-27 VITALS
WEIGHT: 211.6 LBS | SYSTOLIC BLOOD PRESSURE: 109 MMHG | DIASTOLIC BLOOD PRESSURE: 51 MMHG | HEIGHT: 75 IN | OXYGEN SATURATION: 94 % | RESPIRATION RATE: 17 BRPM | BODY MASS INDEX: 26.31 KG/M2 | HEART RATE: 85 BPM | TEMPERATURE: 98.5 F

## 2018-03-27 LAB
ALBUMIN SERPL-MCNC: 3.9 G/DL (ref 3.5–5.1)
ANION GAP SERPL CALCULATED.3IONS-SCNC: 15 MEQ/L (ref 8–16)
BASOPHILS # BLD: 0.7 %
BASOPHILS ABSOLUTE: 0 THOU/MM3 (ref 0–0.1)
BUN BLDV-MCNC: 58 MG/DL (ref 7–22)
CALCIUM SERPL-MCNC: 9.4 MG/DL (ref 8.5–10.5)
CHLORIDE BLD-SCNC: 95 MEQ/L (ref 98–111)
CO2: 28 MEQ/L (ref 23–33)
CREAT SERPL-MCNC: 8 MG/DL (ref 0.4–1.2)
EOSINOPHIL # BLD: 5.2 %
EOSINOPHILS ABSOLUTE: 0.2 THOU/MM3 (ref 0–0.4)
GFR SERPL CREATININE-BSD FRML MDRD: 9 ML/MIN/1.73M2
GLUCOSE BLD-MCNC: 85 MG/DL (ref 70–108)
HCT VFR BLD CALC: 27.2 % (ref 42–52)
HEMOGLOBIN: 9.5 GM/DL (ref 14–18)
LYMPHOCYTES # BLD: 16.8 %
LYMPHOCYTES ABSOLUTE: 0.6 THOU/MM3 (ref 1–4.8)
MAGNESIUM: 2.7 MG/DL (ref 1.6–2.4)
MCH RBC QN AUTO: 32.7 PG (ref 27–31)
MCHC RBC AUTO-ENTMCNC: 34.8 GM/DL (ref 33–37)
MCV RBC AUTO: 93.9 FL (ref 80–94)
MONOCYTES # BLD: 10.5 %
MONOCYTES ABSOLUTE: 0.4 THOU/MM3 (ref 0.4–1.3)
NUCLEATED RED BLOOD CELLS: 0 /100 WBC
PDW BLD-RTO: 13.5 % (ref 11.5–14.5)
PHOSPHORUS: 4.6 MG/DL (ref 2.4–4.7)
PLATELET # BLD: 125 THOU/MM3 (ref 130–400)
PMV BLD AUTO: 7.6 FL (ref 7.4–10.4)
POTASSIUM SERPL-SCNC: 5.4 MEQ/L (ref 3.5–5.2)
RBC # BLD: 2.9 MILL/MM3 (ref 4.7–6.1)
SEG NEUTROPHILS: 66.8 %
SEGMENTED NEUTROPHILS ABSOLUTE COUNT: 2.5 THOU/MM3 (ref 1.8–7.7)
SODIUM BLD-SCNC: 138 MEQ/L (ref 135–145)
WBC # BLD: 3.8 THOU/MM3 (ref 4.8–10.8)

## 2018-03-27 PROCEDURE — 6370000000 HC RX 637 (ALT 250 FOR IP): Performed by: PHYSICIAN ASSISTANT

## 2018-03-27 PROCEDURE — 99239 HOSP IP/OBS DSCHRG MGMT >30: CPT | Performed by: INTERNAL MEDICINE

## 2018-03-27 PROCEDURE — 36415 COLL VENOUS BLD VENIPUNCTURE: CPT

## 2018-03-27 PROCEDURE — 80069 RENAL FUNCTION PANEL: CPT

## 2018-03-27 PROCEDURE — 99232 SBSQ HOSP IP/OBS MODERATE 35: CPT | Performed by: INTERNAL MEDICINE

## 2018-03-27 PROCEDURE — 6370000000 HC RX 637 (ALT 250 FOR IP): Performed by: INTERNAL MEDICINE

## 2018-03-27 PROCEDURE — 6370000000 HC RX 637 (ALT 250 FOR IP): Performed by: HOSPITALIST

## 2018-03-27 PROCEDURE — 83735 ASSAY OF MAGNESIUM: CPT

## 2018-03-27 PROCEDURE — 6370000000 HC RX 637 (ALT 250 FOR IP): Performed by: NURSE PRACTITIONER

## 2018-03-27 PROCEDURE — 85025 COMPLETE CBC W/AUTO DIFF WBC: CPT

## 2018-03-27 RX ORDER — SODIUM POLYSTYRENE SULFONATE 15 G/60ML
15 SUSPENSION ORAL; RECTAL ONCE
Status: COMPLETED | OUTPATIENT
Start: 2018-03-27 | End: 2018-03-27

## 2018-03-27 RX ORDER — CEPHALEXIN 250 MG/1
250 CAPSULE ORAL EVERY 12 HOURS SCHEDULED
Qty: 10 CAPSULE | Refills: 0 | Status: SHIPPED | OUTPATIENT
Start: 2018-03-27 | End: 2018-04-01

## 2018-03-27 RX ADMIN — FLUTICASONE PROPIONATE 1 SPRAY: 50 SPRAY, METERED NASAL at 08:53

## 2018-03-27 RX ADMIN — MULTIPLE VITAMINS W/ MINERALS TAB 1 TABLET: TAB at 08:52

## 2018-03-27 RX ADMIN — SEVELAMER CARBONATE 1600 MG: 800 TABLET, FILM COATED ORAL at 11:36

## 2018-03-27 RX ADMIN — VERAPAMIL HYDROCHLORIDE 240 MG: 240 TABLET, FILM COATED, EXTENDED RELEASE ORAL at 08:52

## 2018-03-27 RX ADMIN — VITAMIN D, TAB 1000IU (100/BT) 2000 UNITS: 25 TAB at 08:52

## 2018-03-27 RX ADMIN — MINOXIDIL 15 MG: 2.5 TABLET ORAL at 08:53

## 2018-03-27 RX ADMIN — DOXAZOSIN 8 MG: 4 TABLET ORAL at 08:52

## 2018-03-27 RX ADMIN — SERTRALINE 100 MG: 100 TABLET, FILM COATED ORAL at 08:52

## 2018-03-27 RX ADMIN — SEVELAMER CARBONATE 1600 MG: 800 TABLET, FILM COATED ORAL at 08:52

## 2018-03-27 RX ADMIN — SEVELAMER CARBONATE 1600 MG: 800 TABLET, FILM COATED ORAL at 15:55

## 2018-03-27 RX ADMIN — ALUMINUM HYDROXIDE, MAGNESIUM HYDROXIDE, AND SIMETHICONE 30 ML: 200; 200; 20 SUSPENSION ORAL at 03:23

## 2018-03-27 RX ADMIN — ISOSORBIDE MONONITRATE 120 MG: 60 TABLET ORAL at 08:52

## 2018-03-27 RX ADMIN — CLONIDINE HYDROCHLORIDE 0.3 MG: 0.2 TABLET ORAL at 08:52

## 2018-03-27 RX ADMIN — SODIUM POLYSTYRENE SULFONATE 15 G: 15 SUSPENSION ORAL; RECTAL at 09:59

## 2018-03-27 RX ADMIN — ASPIRIN 81 MG: 81 TABLET, COATED ORAL at 08:52

## 2018-03-27 RX ADMIN — CEPHALEXIN 250 MG: 250 CAPSULE ORAL at 08:52

## 2018-03-27 RX ADMIN — FAMOTIDINE 20 MG: 20 TABLET, FILM COATED ORAL at 08:52

## 2018-04-03 ENCOUNTER — HOSPITAL ENCOUNTER (EMERGENCY)
Age: 53
Discharge: HOME | End: 2018-04-04
Payer: MEDICARE

## 2018-04-03 DIAGNOSIS — F32.A DEPRESSION, UNSPECIFIED DEPRESSION TYPE: Primary | ICD-10-CM

## 2018-04-03 DIAGNOSIS — R07.9 CHEST PAIN, UNSPECIFIED TYPE: ICD-10-CM

## 2018-04-03 DIAGNOSIS — F14.10 COCAINE ABUSE (HCC): ICD-10-CM

## 2018-04-03 LAB
ACETAMINOPHEN LEVEL: < 5 UG/ML (ref 0–20)
ALBUMIN SERPL-MCNC: 4.6 G/DL (ref 3.5–5.1)
ALP BLD-CCNC: 87 U/L (ref 38–126)
ALT SERPL-CCNC: 10 U/L (ref 11–66)
AMPHETAMINE+METHAMPHETAMINE URINE SCREEN: NEGATIVE
ANION GAP SERPL CALCULATED.3IONS-SCNC: 23 MEQ/L (ref 8–16)
ANISOCYTOSIS: ABNORMAL
AST SERPL-CCNC: 16 U/L (ref 5–40)
BACTERIA: ABNORMAL /HPF
BARBITURATE QUANTITATIVE URINE: NEGATIVE
BASOPHILS # BLD: 0.5 %
BASOPHILS ABSOLUTE: 0 THOU/MM3 (ref 0–0.1)
BENZODIAZEPINE QUANTITATIVE URINE: NEGATIVE
BILIRUB SERPL-MCNC: 0.4 MG/DL (ref 0.3–1.2)
BILIRUBIN DIRECT: < 0.2 MG/DL (ref 0–0.3)
BILIRUBIN URINE: NEGATIVE
BLOOD, URINE: ABNORMAL
BUN BLDV-MCNC: 82 MG/DL (ref 7–22)
CALCIUM SERPL-MCNC: 9.6 MG/DL (ref 8.5–10.5)
CANNABINOID QUANTITATIVE URINE: NEGATIVE
CASTS 2: ABNORMAL /LPF
CASTS UA: ABNORMAL /LPF
CHARACTER, URINE: ABNORMAL
CHLORIDE BLD-SCNC: 95 MEQ/L (ref 98–111)
CO2: 23 MEQ/L (ref 23–33)
COCAINE METABOLITE QUANTITATIVE URINE: POSITIVE
COLOR: YELLOW
CREAT SERPL-MCNC: 13 MG/DL (ref 0.4–1.2)
CRYSTALS, UA: ABNORMAL
EKG ATRIAL RATE: 98 BPM
EKG P AXIS: 59 DEGREES
EKG P-R INTERVAL: 200 MS
EKG Q-T INTERVAL: 388 MS
EKG QRS DURATION: 98 MS
EKG QTC CALCULATION (BAZETT): 495 MS
EKG R AXIS: -45 DEGREES
EKG T AXIS: 58 DEGREES
EKG VENTRICULAR RATE: 98 BPM
EOSINOPHIL # BLD: 1.3 %
EOSINOPHILS ABSOLUTE: 0.1 THOU/MM3 (ref 0–0.4)
EPITHELIAL CELLS, UA: ABNORMAL /HPF
ETHYL ALCOHOL, SERUM: < 0.01 %
GFR SERPL CREATININE-BSD FRML MDRD: 5 ML/MIN/1.73M2
GLUCOSE BLD-MCNC: 89 MG/DL (ref 70–108)
GLUCOSE URINE: NEGATIVE MG/DL
HCT VFR BLD CALC: 29.8 % (ref 42–52)
HEMOGLOBIN: 10 GM/DL (ref 14–18)
KETONES, URINE: NEGATIVE
LEUKOCYTE ESTERASE, URINE: NEGATIVE
LYMPHOCYTES # BLD: 7.3 %
LYMPHOCYTES ABSOLUTE: 0.7 THOU/MM3 (ref 1–4.8)
MCH RBC QN AUTO: 32.1 PG (ref 27–31)
MCHC RBC AUTO-ENTMCNC: 33.6 GM/DL (ref 33–37)
MCV RBC AUTO: 95.4 FL (ref 80–94)
MISCELLANEOUS 2: ABNORMAL
MONOCYTES # BLD: 6.5 %
MONOCYTES ABSOLUTE: 0.6 THOU/MM3 (ref 0.4–1.3)
NITRITE, URINE: NEGATIVE
NUCLEATED RED BLOOD CELLS: 0 /100 WBC
OPIATES, URINE: NEGATIVE
OSMOLALITY CALCULATION: 305.5 MOSMOL/KG (ref 275–300)
OXYCODONE: NEGATIVE
PDW BLD-RTO: 15 % (ref 11.5–14.5)
PH UA: 8
PHENCYCLIDINE QUANTITATIVE URINE: NEGATIVE
PLATELET # BLD: 169 THOU/MM3 (ref 130–400)
PMV BLD AUTO: 6.5 FL (ref 7.4–10.4)
POTASSIUM SERPL-SCNC: 4.7 MEQ/L (ref 3.5–5.2)
PROTEIN UA: 100
RBC # BLD: 3.13 MILL/MM3 (ref 4.7–6.1)
RBC URINE: ABNORMAL /HPF
RENAL EPITHELIAL, UA: ABNORMAL
SALICYLATE, SERUM: < 0.3 MG/DL (ref 2–10)
SEG NEUTROPHILS: 84.4 %
SEGMENTED NEUTROPHILS ABSOLUTE COUNT: 7.8 THOU/MM3 (ref 1.8–7.7)
SODIUM BLD-SCNC: 141 MEQ/L (ref 135–145)
SPECIFIC GRAVITY, URINE: 1.01 (ref 1–1.03)
TOTAL PROTEIN: 8.2 G/DL (ref 6.1–8)
TROPONIN T: 0.03 NG/ML
TSH SERPL DL<=0.05 MIU/L-ACNC: 1.6 UIU/ML (ref 0.4–4.2)
UROBILINOGEN, URINE: 0.2 EU/DL
WBC # BLD: 9.2 THOU/MM3 (ref 4.8–10.8)
WBC UA: ABNORMAL /HPF
YEAST: ABNORMAL

## 2018-04-03 PROCEDURE — 80307 DRUG TEST PRSMV CHEM ANLYZR: CPT

## 2018-04-03 PROCEDURE — 99285 EMERGENCY DEPT VISIT HI MDM: CPT

## 2018-04-03 PROCEDURE — 80053 COMPREHEN METABOLIC PANEL: CPT

## 2018-04-03 PROCEDURE — 84443 ASSAY THYROID STIM HORMONE: CPT

## 2018-04-03 PROCEDURE — 82248 BILIRUBIN DIRECT: CPT

## 2018-04-03 PROCEDURE — 36415 COLL VENOUS BLD VENIPUNCTURE: CPT

## 2018-04-03 PROCEDURE — 84484 ASSAY OF TROPONIN QUANT: CPT

## 2018-04-03 PROCEDURE — G0480 DRUG TEST DEF 1-7 CLASSES: HCPCS

## 2018-04-03 PROCEDURE — 93005 ELECTROCARDIOGRAM TRACING: CPT | Performed by: NURSE PRACTITIONER

## 2018-04-03 PROCEDURE — 85025 COMPLETE CBC W/AUTO DIFF WBC: CPT

## 2018-04-03 PROCEDURE — 81001 URINALYSIS AUTO W/SCOPE: CPT

## 2018-04-03 PROCEDURE — 6370000000 HC RX 637 (ALT 250 FOR IP): Performed by: NURSE PRACTITIONER

## 2018-04-03 RX ORDER — CLONIDINE HYDROCHLORIDE 0.2 MG/1
0.2 TABLET ORAL ONCE
Status: COMPLETED | OUTPATIENT
Start: 2018-04-03 | End: 2018-04-03

## 2018-04-03 RX ADMIN — CLONIDINE HYDROCHLORIDE 0.2 MG: 0.2 TABLET ORAL at 23:26

## 2018-04-03 ASSESSMENT — LIFESTYLE VARIABLES: HISTORY_ALCOHOL_USE: NO

## 2018-04-03 ASSESSMENT — PAIN DESCRIPTION - LOCATION: LOCATION: CHEST;ELBOW

## 2018-04-03 ASSESSMENT — ENCOUNTER SYMPTOMS
VOMITING: 0
ABDOMINAL PAIN: 0
BACK PAIN: 0
DIARRHEA: 0
NAUSEA: 0
RHINORRHEA: 0
EYE REDNESS: 0
SORE THROAT: 0
EYE DISCHARGE: 0
SHORTNESS OF BREATH: 0
WHEEZING: 0
COUGH: 0

## 2018-04-03 ASSESSMENT — PAIN DESCRIPTION - ONSET: ONSET: SUDDEN

## 2018-04-03 ASSESSMENT — SLEEP AND FATIGUE QUESTIONNAIRES: DO YOU HAVE DIFFICULTY SLEEPING: YES

## 2018-04-03 ASSESSMENT — PAIN DESCRIPTION - DESCRIPTORS: DESCRIPTORS: SHARP;STABBING

## 2018-04-03 ASSESSMENT — PAIN DESCRIPTION - PAIN TYPE: TYPE: ACUTE PAIN

## 2018-04-03 ASSESSMENT — PAIN DESCRIPTION - ORIENTATION: ORIENTATION: LEFT

## 2018-04-03 ASSESSMENT — PAIN SCALES - GENERAL: PAINLEVEL_OUTOF10: 6

## 2018-04-03 ASSESSMENT — PAIN DESCRIPTION - FREQUENCY: FREQUENCY: CONTINUOUS

## 2018-04-04 VITALS
TEMPERATURE: 99.5 F | BODY MASS INDEX: 26.37 KG/M2 | WEIGHT: 211 LBS | OXYGEN SATURATION: 94 % | RESPIRATION RATE: 22 BRPM | SYSTOLIC BLOOD PRESSURE: 190 MMHG | HEART RATE: 98 BPM | DIASTOLIC BLOOD PRESSURE: 108 MMHG

## 2018-04-04 LAB — TROPONIN T: 0.03 NG/ML

## 2018-04-04 PROCEDURE — 93010 ELECTROCARDIOGRAM REPORT: CPT | Performed by: INTERNAL MEDICINE

## 2018-04-04 PROCEDURE — 84484 ASSAY OF TROPONIN QUANT: CPT

## 2018-04-04 PROCEDURE — 36415 COLL VENOUS BLD VENIPUNCTURE: CPT

## 2018-04-04 PROCEDURE — 6370000000 HC RX 637 (ALT 250 FOR IP): Performed by: NURSE PRACTITIONER

## 2018-04-04 RX ORDER — MINOXIDIL 2.5 MG/1
10 TABLET ORAL ONCE
Status: COMPLETED | OUTPATIENT
Start: 2018-04-04 | End: 2018-04-04

## 2018-04-04 RX ORDER — DOXAZOSIN MESYLATE 4 MG/1
8 TABLET ORAL ONCE
Status: COMPLETED | OUTPATIENT
Start: 2018-04-04 | End: 2018-04-04

## 2018-04-04 RX ADMIN — MINOXIDIL 10 MG: 2.5 TABLET ORAL at 00:16

## 2018-04-04 RX ADMIN — DOXAZOSIN 8 MG: 4 TABLET ORAL at 00:15

## 2018-04-04 ASSESSMENT — HEART SCORE: ECG: 0

## 2018-04-18 ENCOUNTER — TELEPHONE (OUTPATIENT)
Dept: CARDIOLOGY CLINIC | Age: 53
End: 2018-04-18

## 2018-04-20 ENCOUNTER — TELEPHONE (OUTPATIENT)
Dept: CARDIOLOGY CLINIC | Age: 53
End: 2018-04-20

## 2018-05-16 ENCOUNTER — TELEPHONE (OUTPATIENT)
Dept: CARDIOLOGY CLINIC | Age: 53
End: 2018-05-16

## 2018-07-06 ENCOUNTER — PREP FOR PROCEDURE (OUTPATIENT)
Dept: CARDIOLOGY | Age: 53
End: 2018-07-06

## 2018-07-06 RX ORDER — SODIUM CHLORIDE 0.9 % (FLUSH) 0.9 %
10 SYRINGE (ML) INJECTION EVERY 12 HOURS SCHEDULED
Status: CANCELLED | OUTPATIENT
Start: 2018-07-06 | End: 2019-07-06

## 2018-07-06 RX ORDER — SODIUM CHLORIDE 0.9 % (FLUSH) 0.9 %
10 SYRINGE (ML) INJECTION PRN
Status: CANCELLED | OUTPATIENT
Start: 2018-07-06 | End: 2019-07-06

## 2018-07-09 ENCOUNTER — TELEPHONE (OUTPATIENT)
Dept: CARDIOLOGY CLINIC | Age: 53
End: 2018-07-09

## 2018-07-09 NOTE — TELEPHONE ENCOUNTER
Patient was a no show for the aflutter ablation on 7. 9.2018. Spoke with patient states that he is living in [place],  And he is currently admitted to [facility]  He states that he had surgery on his aneurysm.

## 2018-08-05 ENCOUNTER — APPOINTMENT (OUTPATIENT)
Dept: GENERAL RADIOLOGY | Age: 53
DRG: 640 | End: 2018-08-05
Payer: MEDICARE

## 2018-08-05 ENCOUNTER — HOSPITAL ENCOUNTER (INPATIENT)
Age: 53
LOS: 1 days | Discharge: HOME | DRG: 640 | End: 2018-08-06
Attending: FAMILY MEDICINE | Admitting: HOSPITALIST
Payer: MEDICARE

## 2018-08-05 DIAGNOSIS — Z99.2 ESRD (END STAGE RENAL DISEASE) ON DIALYSIS (HCC): Primary | ICD-10-CM

## 2018-08-05 DIAGNOSIS — E87.79 OTHER HYPERVOLEMIA: ICD-10-CM

## 2018-08-05 DIAGNOSIS — N18.6 ESRD (END STAGE RENAL DISEASE) ON DIALYSIS (HCC): Primary | ICD-10-CM

## 2018-08-05 PROBLEM — E87.70 FLUID OVERLOAD: Status: ACTIVE | Noted: 2018-08-05

## 2018-08-05 LAB
ALBUMIN SERPL-MCNC: 3.5 G/DL (ref 3.5–5.1)
ALLEN TEST: POSITIVE
ALP BLD-CCNC: 92 U/L (ref 38–126)
ALT SERPL-CCNC: < 5 U/L (ref 11–66)
ANION GAP SERPL CALCULATED.3IONS-SCNC: 21 MEQ/L (ref 8–16)
AST SERPL-CCNC: 8 U/L (ref 5–40)
BASE EXCESS (CALCULATED): 2.4 MMOL/L (ref -2.5–2.5)
BILIRUB SERPL-MCNC: 0.3 MG/DL (ref 0.3–1.2)
BUN BLDV-MCNC: 120 MG/DL (ref 7–22)
CALCIUM SERPL-MCNC: 8.4 MG/DL (ref 8.5–10.5)
CHLORIDE BLD-SCNC: 98 MEQ/L (ref 98–111)
CO2: 23 MEQ/L (ref 23–33)
COLLECTED BY:: ABNORMAL
CREAT SERPL-MCNC: 12.5 MG/DL (ref 0.4–1.2)
DEVICE: ABNORMAL
EKG ATRIAL RATE: 106 BPM
EKG P AXIS: 35 DEGREES
EKG P-R INTERVAL: 184 MS
EKG Q-T INTERVAL: 364 MS
EKG QRS DURATION: 96 MS
EKG QTC CALCULATION (BAZETT): 483 MS
EKG R AXIS: -42 DEGREES
EKG T AXIS: 92 DEGREES
EKG VENTRICULAR RATE: 106 BPM
GFR SERPL CREATININE-BSD FRML MDRD: 5 ML/MIN/1.73M2
GLUCOSE BLD-MCNC: 162 MG/DL (ref 70–108)
HBV SURFACE AB TITR SER: POSITIVE {TITER}
HCO3: 28 MMOL/L (ref 23–28)
HEPATITIS B CORE IGM ANTIBODY: NEGATIVE
HEPATITIS B SURFACE ANTIGEN: NEGATIVE
IFIO2: 2
MAGNESIUM: 2.6 MG/DL (ref 1.6–2.4)
O2 SATURATION: 96 %
OSMOLALITY CALCULATION: 325 MOSMOL/KG (ref 275–300)
PCO2: 48 MMHG (ref 35–45)
PH BLOOD GAS: 7.37 (ref 7.35–7.45)
PHOSPHORUS: 4.8 MG/DL (ref 2.4–4.7)
PO2: 87 MMHG (ref 71–104)
POTASSIUM SERPL-SCNC: 5.7 MEQ/L (ref 3.5–5.2)
SCAN OF BLOOD SMEAR: NORMAL
SODIUM BLD-SCNC: 142 MEQ/L (ref 135–145)
SOURCE, BLOOD GAS: ABNORMAL
TOTAL PROTEIN: 7.3 G/DL (ref 6.1–8)

## 2018-08-05 PROCEDURE — 71046 X-RAY EXAM CHEST 2 VIEWS: CPT

## 2018-08-05 PROCEDURE — 90935 HEMODIALYSIS ONE EVALUATION: CPT

## 2018-08-05 PROCEDURE — 36600 WITHDRAWAL OF ARTERIAL BLOOD: CPT

## 2018-08-05 PROCEDURE — 86706 HEP B SURFACE ANTIBODY: CPT

## 2018-08-05 PROCEDURE — 80053 COMPREHEN METABOLIC PANEL: CPT

## 2018-08-05 PROCEDURE — 84100 ASSAY OF PHOSPHORUS: CPT

## 2018-08-05 PROCEDURE — 82803 BLOOD GASES ANY COMBINATION: CPT

## 2018-08-05 PROCEDURE — G0378 HOSPITAL OBSERVATION PER HR: HCPCS

## 2018-08-05 PROCEDURE — 99223 1ST HOSP IP/OBS HIGH 75: CPT | Performed by: HOSPITALIST

## 2018-08-05 PROCEDURE — 83735 ASSAY OF MAGNESIUM: CPT

## 2018-08-05 PROCEDURE — 86705 HEP B CORE ANTIBODY IGM: CPT

## 2018-08-05 PROCEDURE — 36415 COLL VENOUS BLD VENIPUNCTURE: CPT

## 2018-08-05 PROCEDURE — 90935 HEMODIALYSIS ONE EVALUATION: CPT | Performed by: INTERNAL MEDICINE

## 2018-08-05 PROCEDURE — 93005 ELECTROCARDIOGRAM TRACING: CPT | Performed by: FAMILY MEDICINE

## 2018-08-05 PROCEDURE — 99214 OFFICE O/P EST MOD 30 MIN: CPT | Performed by: INTERNAL MEDICINE

## 2018-08-05 PROCEDURE — 87340 HEPATITIS B SURFACE AG IA: CPT

## 2018-08-05 PROCEDURE — 85025 COMPLETE CBC W/AUTO DIFF WBC: CPT

## 2018-08-05 PROCEDURE — 99284 EMERGENCY DEPT VISIT MOD MDM: CPT

## 2018-08-05 PROCEDURE — 5A1D70Z PERFORMANCE OF URINARY FILTRATION, INTERMITTENT, LESS THAN 6 HOURS PER DAY: ICD-10-PCS | Performed by: HOSPITALIST

## 2018-08-05 RX ORDER — HYDROXYZINE 50 MG/1
50 TABLET, FILM COATED ORAL 3 TIMES DAILY PRN
COMMUNITY

## 2018-08-05 ASSESSMENT — ENCOUNTER SYMPTOMS
VOMITING: 0
WHEEZING: 0
SHORTNESS OF BREATH: 1
EYE REDNESS: 0
EYE DISCHARGE: 0
DIARRHEA: 0
NAUSEA: 0
COUGH: 0
BACK PAIN: 0
RHINORRHEA: 0
ABDOMINAL PAIN: 0
SORE THROAT: 0

## 2018-08-05 NOTE — ED PROVIDER NOTES
and moist and mucous membranes are normal. No oropharyngeal exudate, posterior oropharyngeal edema or posterior oropharyngeal erythema. Eyes: Conjunctivae and EOM are normal.   Neck: Normal range of motion. Neck supple. No JVD present. Cardiovascular: Normal rate, regular rhythm, normal heart sounds, intact distal pulses and normal pulses. Exam reveals no gallop and no friction rub. No murmur heard. Pulmonary/Chest: Effort normal. No respiratory distress. He has no decreased breath sounds. He has no wheezes. He has no rhonchi. He has rales (crackles bilaterally). Abdominal: Soft. Bowel sounds are normal. He exhibits distension. There is no tenderness. There is no rebound, no guarding and no CVA tenderness. Musculoskeletal: Normal range of motion. He exhibits edema (bilateral lower extremities). Neurological: He is alert and oriented to person, place, and time. He exhibits normal muscle tone. Coordination normal.   Skin: Skin is warm and dry. No rash noted. He is not diaphoretic. Nursing note and vitals reviewed. DIFFERENTIAL DIAGNOSIS:   Including but not limited to: CHF, COPD, acute on chronic renal failure, electrolyte imbalance, flash pulmonary edema. DIAGNOSTIC RESULTS     EKG: All EKG's are interpreted by the Emergency Department Physician who either signs or Co-signs this chart in the absence of a cardiologist.  EKG interpreted by Elijah Velásquez MD:    Vent.  Rate: 106 bpm  RI interval: 184 ms  QRS duration: 96 ms  QTc: 483 ms  P-R-T axes: 35, -42, 92  Sinus tachycardia,  Possible left atrial enlargement,   Left axis deviation  Left ventricular hypertrophy  T wave abnormality, consider lateral ischemia  No STEMI  Compared to old EKG on 04-    RADIOLOGY: non-plain film images(s) such as CT, Ultrasound and MRI are read by the radiologist.    XR CHEST STANDARD (2 VW)   Final Result   Small to moderate left-sided pleural effusion with associated opacities which may represent (1.905 m)        6:34 PM: The patient was seen and evaluated. MDM:  The patient was seen and evaluated in a timely manner for missed dialysis appointments. His vital signs were stable with some hypertension and tachycardia noted. During the physical exam I noted abdominal distension, bilateral crackles to the lungs, bilateral lower extremity edema, and a nasal canula was in place. I ordered appropriate labs and a chest x-ray. The chest x-ray showed small to moderate left-sided pleural effusion with associated opacities which may represent atelectasis or infiltrate. The patient's BUN and creatinine were elevated as was his anion gap. Laboratory and imaging results were reviewed and discussed with the patient. Although the patient responded well to treatment, I decided that the patient could benefit from admission to the hospital for inpatient dialysis. I discussed the case with Dr. Manish Mckeon nephrologist, who requested labs be collected but agreed to consult on the patient, and oversee the dialysis. I discussed the case with Dr. Chuy Mcallister hospitalist, who graciously agreed to accept the patient. I explained my proposed course of admission to the patient, and he verbalized understanding and agreement with my proposed plan. All his questions were addressed at bedside. The patient was admitted in stable condition under huma Wong.      CRITICAL CARE:   None     CONSULTS:  6:21 PM:  I discussed the case with Dr. Manish Mckeon nephrologist, who requested labs be collected but agreed to consult on the patient, and oversee the dialysis. 7:57 PM: I discussed the case with huma Wong, who graciously agreed to accept the patient.      PROCEDURES:  None     FINAL IMPRESSION      1. ESRD (end stage renal disease) on dialysis ([facility] [address]    2. Other hypervolemia          DISPOSITION/PLAN   The patient was admitted in stable condition under huma Wong.    PATIENT REFERRED TO:  No follow-up provider

## 2018-08-06 ENCOUNTER — APPOINTMENT (OUTPATIENT)
Dept: GENERAL RADIOLOGY | Age: 53
DRG: 640 | End: 2018-08-06
Payer: MEDICARE

## 2018-08-06 VITALS
HEIGHT: 75 IN | WEIGHT: 203.93 LBS | TEMPERATURE: 97.8 F | RESPIRATION RATE: 18 BRPM | DIASTOLIC BLOOD PRESSURE: 61 MMHG | SYSTOLIC BLOOD PRESSURE: 133 MMHG | BODY MASS INDEX: 25.36 KG/M2 | OXYGEN SATURATION: 92 % | HEART RATE: 101 BPM

## 2018-08-06 PROBLEM — I10 ACCELERATED HYPERTENSION: Status: ACTIVE | Noted: 2018-08-06

## 2018-08-06 LAB
ALBUMIN SERPL-MCNC: 3.4 G/DL (ref 3.5–5.1)
ALP BLD-CCNC: 91 U/L (ref 38–126)
ALT SERPL-CCNC: < 5 U/L (ref 11–66)
AMPHETAMINE+METHAMPHETAMINE URINE SCREEN: NEGATIVE
ANION GAP SERPL CALCULATED.3IONS-SCNC: 15 MEQ/L (ref 8–16)
ANISOCYTOSIS: PRESENT
AST SERPL-CCNC: 6 U/L (ref 5–40)
BARBITURATE QUANTITATIVE URINE: NEGATIVE
BASOPHILS # BLD: 0.2 %
BASOPHILS # BLD: 0.2 %
BASOPHILS ABSOLUTE: 0 THOU/MM3 (ref 0–0.1)
BASOPHILS ABSOLUTE: 0 THOU/MM3 (ref 0–0.1)
BENZODIAZEPINE QUANTITATIVE URINE: NEGATIVE
BILIRUB SERPL-MCNC: 0.3 MG/DL (ref 0.3–1.2)
BILIRUBIN DIRECT: < 0.2 MG/DL (ref 0–0.3)
BUN BLDV-MCNC: 64 MG/DL (ref 7–22)
CALCIUM SERPL-MCNC: 8.7 MG/DL (ref 8.5–10.5)
CANNABINOID QUANTITATIVE URINE: NEGATIVE
CHLORIDE BLD-SCNC: 99 MEQ/L (ref 98–111)
CO2: 26 MEQ/L (ref 23–33)
COCAINE METABOLITE QUANTITATIVE URINE: POSITIVE
CREAT SERPL-MCNC: 8.3 MG/DL (ref 0.4–1.2)
ELLIPTOCYTES: ABNORMAL
EOSINOPHIL # BLD: 3.7 %
EOSINOPHIL # BLD: 4.2 %
EOSINOPHILS ABSOLUTE: 0.2 THOU/MM3 (ref 0–0.4)
EOSINOPHILS ABSOLUTE: 0.2 THOU/MM3 (ref 0–0.4)
ERYTHROCYTE [DISTWIDTH] IN BLOOD BY AUTOMATED COUNT: 13.7 % (ref 11.5–14.5)
ERYTHROCYTE [DISTWIDTH] IN BLOOD BY AUTOMATED COUNT: 13.8 % (ref 11.5–14.5)
ERYTHROCYTE [DISTWIDTH] IN BLOOD BY AUTOMATED COUNT: 47.1 FL (ref 35–45)
ERYTHROCYTE [DISTWIDTH] IN BLOOD BY AUTOMATED COUNT: 49 FL (ref 35–45)
GFR SERPL CREATININE-BSD FRML MDRD: 8 ML/MIN/1.73M2
GLUCOSE BLD-MCNC: 143 MG/DL (ref 70–108)
HCT VFR BLD CALC: 22.1 % (ref 42–52)
HCT VFR BLD CALC: 23.1 % (ref 42–52)
HEMOGLOBIN: 6.9 GM/DL (ref 14–18)
HEMOGLOBIN: 7 GM/DL (ref 14–18)
HYPOCHROMIA: PRESENT
IMMATURE GRANS (ABS): 0.01 THOU/MM3 (ref 0–0.07)
IMMATURE GRANS (ABS): 0.02 THOU/MM3 (ref 0–0.07)
IMMATURE GRANULOCYTES: 0.2 %
IMMATURE GRANULOCYTES: 0.4 %
LYMPHOCYTES # BLD: 11.9 %
LYMPHOCYTES # BLD: 13.9 %
LYMPHOCYTES ABSOLUTE: 0.5 THOU/MM3 (ref 1–4.8)
LYMPHOCYTES ABSOLUTE: 0.6 THOU/MM3 (ref 1–4.8)
MCH RBC QN AUTO: 29.3 PG (ref 26–33)
MCH RBC QN AUTO: 29.4 PG (ref 26–33)
MCHC RBC AUTO-ENTMCNC: 30.3 GM/DL (ref 32.2–35.5)
MCHC RBC AUTO-ENTMCNC: 31.2 GM/DL (ref 32.2–35.5)
MCV RBC AUTO: 94 FL (ref 80–94)
MCV RBC AUTO: 96.7 FL (ref 80–94)
MONOCYTES # BLD: 10.2 %
MONOCYTES # BLD: 10.8 %
MONOCYTES ABSOLUTE: 0.5 THOU/MM3 (ref 0.4–1.3)
MONOCYTES ABSOLUTE: 0.5 THOU/MM3 (ref 0.4–1.3)
NUCLEATED RED BLOOD CELLS: 0 /100 WBC
NUCLEATED RED BLOOD CELLS: 0 /100 WBC
OPIATES, URINE: NEGATIVE
OXYCODONE: NEGATIVE
PATHOLOGIST REVIEW: ABNORMAL
PHENCYCLIDINE QUANTITATIVE URINE: NEGATIVE
PLATELET # BLD: 137 THOU/MM3 (ref 130–400)
PLATELET # BLD: 139 THOU/MM3 (ref 130–400)
PMV BLD AUTO: 10 FL (ref 9.4–12.4)
PMV BLD AUTO: 9.9 FL (ref 9.4–12.4)
POIKILOCYTES: ABNORMAL
POTASSIUM REFLEX MAGNESIUM: 5 MEQ/L (ref 3.5–5.2)
RBC # BLD: 2.35 MILL/MM3 (ref 4.7–6.1)
RBC # BLD: 2.39 MILL/MM3 (ref 4.7–6.1)
SEG NEUTROPHILS: 71.6 %
SEG NEUTROPHILS: 72.7 %
SEGMENTED NEUTROPHILS ABSOLUTE COUNT: 3.3 THOU/MM3 (ref 1.8–7.7)
SEGMENTED NEUTROPHILS ABSOLUTE COUNT: 3.3 THOU/MM3 (ref 1.8–7.7)
SODIUM BLD-SCNC: 140 MEQ/L (ref 135–145)
TOTAL PROTEIN: 7.5 G/DL (ref 6.1–8)
WBC # BLD: 4.6 THOU/MM3 (ref 4.8–10.8)
WBC # BLD: 4.6 THOU/MM3 (ref 4.8–10.8)

## 2018-08-06 PROCEDURE — 36415 COLL VENOUS BLD VENIPUNCTURE: CPT

## 2018-08-06 PROCEDURE — 90935 HEMODIALYSIS ONE EVALUATION: CPT

## 2018-08-06 PROCEDURE — 93010 ELECTROCARDIOGRAM REPORT: CPT | Performed by: INTERNAL MEDICINE

## 2018-08-06 PROCEDURE — 6360000002 HC RX W HCPCS: Performed by: HOSPITALIST

## 2018-08-06 PROCEDURE — 2580000003 HC RX 258: Performed by: HOSPITALIST

## 2018-08-06 PROCEDURE — 85025 COMPLETE CBC W/AUTO DIFF WBC: CPT

## 2018-08-06 PROCEDURE — 99239 HOSP IP/OBS DSCHRG MGMT >30: CPT | Performed by: INTERNAL MEDICINE

## 2018-08-06 PROCEDURE — 1200000003 HC TELEMETRY R&B

## 2018-08-06 PROCEDURE — 2500000003 HC RX 250 WO HCPCS: Performed by: HOSPITALIST

## 2018-08-06 PROCEDURE — 80048 BASIC METABOLIC PNL TOTAL CA: CPT

## 2018-08-06 PROCEDURE — 80307 DRUG TEST PRSMV CHEM ANLYZR: CPT

## 2018-08-06 PROCEDURE — 96374 THER/PROPH/DIAG INJ IV PUSH: CPT

## 2018-08-06 PROCEDURE — 96372 THER/PROPH/DIAG INJ SC/IM: CPT

## 2018-08-06 PROCEDURE — 90935 HEMODIALYSIS ONE EVALUATION: CPT | Performed by: INTERNAL MEDICINE

## 2018-08-06 PROCEDURE — G0378 HOSPITAL OBSERVATION PER HR: HCPCS

## 2018-08-06 PROCEDURE — 80076 HEPATIC FUNCTION PANEL: CPT

## 2018-08-06 PROCEDURE — 6370000000 HC RX 637 (ALT 250 FOR IP): Performed by: HOSPITALIST

## 2018-08-06 RX ORDER — MINOXIDIL 2.5 MG/1
15 TABLET ORAL EVERY 12 HOURS
Status: DISCONTINUED | OUTPATIENT
Start: 2018-08-06 | End: 2018-08-06 | Stop reason: HOSPADM

## 2018-08-06 RX ORDER — SERTRALINE HYDROCHLORIDE 100 MG/1
100 TABLET, FILM COATED ORAL DAILY
Status: DISCONTINUED | OUTPATIENT
Start: 2018-08-06 | End: 2018-08-06 | Stop reason: HOSPADM

## 2018-08-06 RX ORDER — ONDANSETRON 4 MG/1
4 TABLET, FILM COATED ORAL EVERY 6 HOURS PRN
Status: DISCONTINUED | OUTPATIENT
Start: 2018-08-06 | End: 2018-08-06 | Stop reason: HOSPADM

## 2018-08-06 RX ORDER — VERAPAMIL HYDROCHLORIDE 240 MG/1
240 TABLET, FILM COATED, EXTENDED RELEASE ORAL DAILY
Status: DISCONTINUED | OUTPATIENT
Start: 2018-08-06 | End: 2018-08-06 | Stop reason: HOSPADM

## 2018-08-06 RX ORDER — DOCUSATE SODIUM 100 MG/1
100 CAPSULE, LIQUID FILLED ORAL 2 TIMES DAILY
Status: DISCONTINUED | OUTPATIENT
Start: 2018-08-06 | End: 2018-08-06 | Stop reason: HOSPADM

## 2018-08-06 RX ORDER — HYDRALAZINE HYDROCHLORIDE 20 MG/ML
10 INJECTION INTRAMUSCULAR; INTRAVENOUS EVERY 6 HOURS PRN
Status: DISCONTINUED | OUTPATIENT
Start: 2018-08-06 | End: 2018-08-06 | Stop reason: HOSPADM

## 2018-08-06 RX ORDER — ONDANSETRON 2 MG/ML
4 INJECTION INTRAMUSCULAR; INTRAVENOUS EVERY 6 HOURS PRN
Status: DISCONTINUED | OUTPATIENT
Start: 2018-08-06 | End: 2018-08-06 | Stop reason: RX

## 2018-08-06 RX ORDER — SODIUM CHLORIDE 9 MG/ML
INJECTION, SOLUTION INTRAVENOUS CONTINUOUS
Status: DISCONTINUED | OUTPATIENT
Start: 2018-08-06 | End: 2018-08-06

## 2018-08-06 RX ORDER — HYDROXYZINE HYDROCHLORIDE 25 MG/1
50 TABLET, FILM COATED ORAL 3 TIMES DAILY PRN
Status: DISCONTINUED | OUTPATIENT
Start: 2018-08-06 | End: 2018-08-06 | Stop reason: HOSPADM

## 2018-08-06 RX ORDER — DOXAZOSIN MESYLATE 4 MG/1
8 TABLET ORAL EVERY 12 HOURS SCHEDULED
Status: DISCONTINUED | OUTPATIENT
Start: 2018-08-06 | End: 2018-08-06 | Stop reason: HOSPADM

## 2018-08-06 RX ORDER — FAMOTIDINE 20 MG/1
20 TABLET, FILM COATED ORAL DAILY
Status: DISCONTINUED | OUTPATIENT
Start: 2018-08-06 | End: 2018-08-06 | Stop reason: HOSPADM

## 2018-08-06 RX ORDER — ISOSORBIDE MONONITRATE 60 MG/1
120 TABLET, EXTENDED RELEASE ORAL DAILY
Status: DISCONTINUED | OUTPATIENT
Start: 2018-08-06 | End: 2018-08-06 | Stop reason: HOSPADM

## 2018-08-06 RX ORDER — M-VIT,TX,IRON,MINS/CALC/FOLIC 27MG-0.4MG
1 TABLET ORAL DAILY
Status: DISCONTINUED | OUTPATIENT
Start: 2018-08-06 | End: 2018-08-06 | Stop reason: HOSPADM

## 2018-08-06 RX ORDER — SEVELAMER CARBONATE 800 MG/1
1600 TABLET, FILM COATED ORAL
Status: DISCONTINUED | OUTPATIENT
Start: 2018-08-06 | End: 2018-08-06 | Stop reason: HOSPADM

## 2018-08-06 RX ORDER — FLUTICASONE PROPIONATE 50 MCG
1 SPRAY, SUSPENSION (ML) NASAL 2 TIMES DAILY
Status: DISCONTINUED | OUTPATIENT
Start: 2018-08-06 | End: 2018-08-06 | Stop reason: HOSPADM

## 2018-08-06 RX ORDER — HEPARIN SODIUM 5000 [USP'U]/ML
5000 INJECTION, SOLUTION INTRAVENOUS; SUBCUTANEOUS EVERY 8 HOURS
Status: DISCONTINUED | OUTPATIENT
Start: 2018-08-06 | End: 2018-08-06 | Stop reason: HOSPADM

## 2018-08-06 RX ORDER — ATORVASTATIN CALCIUM 40 MG/1
40 TABLET, FILM COATED ORAL NIGHTLY
Status: DISCONTINUED | OUTPATIENT
Start: 2018-08-06 | End: 2018-08-06 | Stop reason: HOSPADM

## 2018-08-06 RX ORDER — SODIUM CHLORIDE 0.9 % (FLUSH) 0.9 %
10 SYRINGE (ML) INJECTION PRN
Status: DISCONTINUED | OUTPATIENT
Start: 2018-08-06 | End: 2018-08-06 | Stop reason: HOSPADM

## 2018-08-06 RX ORDER — LABETALOL HYDROCHLORIDE 5 MG/ML
10 INJECTION, SOLUTION INTRAVENOUS EVERY 4 HOURS PRN
Status: DISCONTINUED | OUTPATIENT
Start: 2018-08-06 | End: 2018-08-06 | Stop reason: HOSPADM

## 2018-08-06 RX ORDER — TRAZODONE HYDROCHLORIDE 50 MG/1
50 TABLET ORAL NIGHTLY PRN
Status: DISCONTINUED | OUTPATIENT
Start: 2018-08-06 | End: 2018-08-06 | Stop reason: HOSPADM

## 2018-08-06 RX ORDER — SODIUM CHLORIDE 0.9 % (FLUSH) 0.9 %
10 SYRINGE (ML) INJECTION EVERY 12 HOURS SCHEDULED
Status: DISCONTINUED | OUTPATIENT
Start: 2018-08-06 | End: 2018-08-06 | Stop reason: HOSPADM

## 2018-08-06 RX ORDER — ASPIRIN 81 MG/1
81 TABLET ORAL DAILY
Status: DISCONTINUED | OUTPATIENT
Start: 2018-08-06 | End: 2018-08-06 | Stop reason: HOSPADM

## 2018-08-06 RX ADMIN — ATORVASTATIN CALCIUM 40 MG: 40 TABLET, FILM COATED ORAL at 01:35

## 2018-08-06 RX ADMIN — ISOSORBIDE MONONITRATE 120 MG: 60 TABLET ORAL at 06:30

## 2018-08-06 RX ADMIN — CLONIDINE HYDROCHLORIDE 0.3 MG: 0.2 TABLET ORAL at 01:35

## 2018-08-06 RX ADMIN — Medication 10 ML: at 12:19

## 2018-08-06 RX ADMIN — MINOXIDIL 15 MG: 2.5 TABLET ORAL at 12:16

## 2018-08-06 RX ADMIN — MULTIPLE VITAMINS W/ MINERALS TAB 1 TABLET: TAB at 12:20

## 2018-08-06 RX ADMIN — CLONIDINE HYDROCHLORIDE 0.3 MG: 0.2 TABLET ORAL at 12:15

## 2018-08-06 RX ADMIN — FAMOTIDINE 20 MG: 20 TABLET, FILM COATED ORAL at 12:20

## 2018-08-06 RX ADMIN — SEVELAMER CARBONATE 1600 MG: 800 TABLET, FILM COATED ORAL at 06:31

## 2018-08-06 RX ADMIN — ASPIRIN 81 MG: 81 TABLET ORAL at 12:20

## 2018-08-06 RX ADMIN — VITAMIN D, TAB 1000IU (100/BT) 2000 UNITS: 25 TAB at 12:17

## 2018-08-06 RX ADMIN — DOXAZOSIN 8 MG: 4 TABLET ORAL at 06:30

## 2018-08-06 RX ADMIN — SEVELAMER CARBONATE 1600 MG: 800 TABLET, FILM COATED ORAL at 12:17

## 2018-08-06 RX ADMIN — SERTRALINE 100 MG: 100 TABLET, FILM COATED ORAL at 12:16

## 2018-08-06 RX ADMIN — MINOXIDIL 15 MG: 2.5 TABLET ORAL at 01:35

## 2018-08-06 RX ADMIN — CLONIDINE HYDROCHLORIDE 0.3 MG: 0.2 TABLET ORAL at 06:31

## 2018-08-06 RX ADMIN — HEPARIN SODIUM 5000 UNITS: 5000 INJECTION, SOLUTION INTRAVENOUS; SUBCUTANEOUS at 01:35

## 2018-08-06 RX ADMIN — VERAPAMIL HYDROCHLORIDE 240 MG: 240 TABLET, FILM COATED, EXTENDED RELEASE ORAL at 12:17

## 2018-08-06 RX ADMIN — LABETALOL HYDROCHLORIDE 10 MG: 5 INJECTION, SOLUTION INTRAVENOUS at 04:15

## 2018-08-06 NOTE — PROGRESS NOTES
progress      Patient:  Flavia Rogers  YOB: 1965    MRN: [id number]     Acct: [de-identified]    PCP: No primary care provider on file. Date of Admission: 8/5/2018    Date of Service: Pt seen/examined on 8/5/18 and placed in Observation for fluid overloaded. Chief Complaint:  Missed 2 HD sessions and required HD. History Of Present Illness:      48 y.o. male who presented to [address] [place] [address] with cocaine abuse, ESRD, HD TTS, DMII, AAA, lipidemia, PTSD, FSGS, and HTN is admitted for fluid overloaded secondary to missed HD. He is in [address] [place] [address], visiting his daughter. He lives in [facility] and gets his dialysis there. He missed Thursday and Saturday dialysis. He was told if he gets SOB and increase leg swelling, he is go to to the ED for HD. He has been SOB for the past 24 hrs and his SOB is worsening. SOB is constant. Nothing made it better or worse. He has increase swelling of the lower extremities.      Subjective-  Has seen him several times prior admissions  Misses Hemodialysis and is noncompliant with medication  Uses cocaine and cocaine induced hypertension    Scheduled Meds:   atorvastatin  40 mg Oral Nightly    aspirin  81 mg Oral Daily    cloNIDine  0.3 mg Oral TID    doxazosin  8 mg Oral 2 times per day    famotidine  20 mg Oral Daily    isosorbide mononitrate  120 mg Oral Daily    fluticasone  1 spray Nasal BID    therapeutic multivitamin-minerals  1 tablet Oral Daily    vitamin D  2,000 Units Oral BID    sertraline  100 mg Oral Daily    minoxidil  15 mg Oral Q12H    verapamil  240 mg Oral Daily    sevelamer  1,600 mg Oral TID WC    sodium chloride flush  10 mL Intravenous 2 times per day    docusate sodium  100 mg Oral BID    heparin (porcine)  5,000 Units Subcutaneous Q8H    darbepoetin qiana-polysorbate  60 mcg Subcutaneous Once     Continuous Infusions:  PRN Meds:.hydrOXYzine, traZODone, sodium chloride flush, ondansetron, hydrALAZINE, labetalol'    Diet: Renal diet  DIET RENAL;    REVIEW OF SYSTEMS:   Pertinent positives as noted in the HPI. All other systems reviewed and negative. PHYSICAL EXAM:    BP (!) 156/63   Pulse 101   Temp 97.8 °F (36.6 °C)   Resp 18   Ht 6' 3\" (1.905 m)   Wt 203 lb 14.8 oz (92.5 kg)   SpO2 92%   BMI 25.49 kg/m²     General appearance:  No apparent distress, appears stated age and cooperative. HEENT:  Normal cephalic, atraumatic without obvious deformity. Pupils equal, round, and reactive to light. Extra ocular muscles intact. Conjunctivae/corneas clear. Neck: Supple, with full range of motion. No jugular venous distention. Trachea midline. Respiratory:  Normal respiratory effort. Clear to auscultation, bilaterally without Rales/Wheezes/Rhonchi. Cardiovascular:  Regular rate and rhythm with normal S1/S2 without murmurs, rubs or gallops. Abdomen: Soft, non-tender, non-distended with normal bowel sounds. Musculoskeletal:  No clubbing, cyanosis or edema bilaterally. Full range of motion without deformity. Skin: Skin color, texture, turgor normal. Right AV fistula in right AC with good thrill and bruit  Neurologic:  Neurovascularly intact without any focal sensory/motor deficits. Cranial nerves: II-XII intact, grossly non-focal.  Psychiatric:  Alert and oriented, thought content appropriate, normal insight  Capillary Refill: Brisk,< 3 seconds   Peripheral Pulses: +2 palpable, equal bilaterally       Labs:     Recent Labs      08/05/18 1814 08/06/18   0533   WBC  4.6*  4.6*   HGB  6.9*  7.0*   HCT  22.1*  23.1*   PLT  139  137     Recent Labs      08/05/18 1814 08/06/18   0533   NA  142  140   K  5.7*  5.0   CL  98  99   CO2  23  26   BUN  120*  64*   CREATININE  12.5*  8.3*   CALCIUM  8.4*  8.7   PHOS  4.8*   --      Recent Labs      08/05/18 1814 08/06/18   0533   AST  8  6   ALT  <5*  <5*   BILIDIR   --   <0.2   BILITOT  0.3  0.3   ALKPHOS  92  91     No results for input(s): INR in the last 72 hours.   No results for noted.  Monitor. 4) HTN: accelerated not in emergency  Probably volume related. Added labetalol and hydralazine. HD will help with SBP control as well. 5) Dispo: Probably can be discharge tomorrow if blood pressure better controlled        Thank you No primary care provider on file. for the opportunity to be involved in this patient's care.     Electronically signed by Alberto Parkinson MD on 8/6/2018 at 1:55 PM

## 2018-08-06 NOTE — FLOWSHEET NOTE
08/06/18 0724 08/06/18 1247   Vital Signs   BP (!) 188/127 (!) 156/63   Temp 97.7 °F (36.5 °C) 97.8 °F (36.6 °C)   Pulse 101 101   Weight 214 lb 15.2 oz (97.5 kg) 203 lb 14.8 oz (92.5 kg)   Post-Hemodialysis Assessment   Post-Treatment Procedures --  Blood returned; Access bleeding time < 10 minutes   Machine Disinfection Process --  Acid/Vinegar Clean;Heat Disinfect; Exterior Machine Disinfection   Total Liters Processed (l/min) --  89.2 l/min   Dialyzer Clearance --  Lightly streaked   Heparin amount administered during treatment (units) --  3000 units   Hemodialysis Intake (ml) --  400 ml   Hemodialysis Output (ml) --  5400 ml   NET Removed (ml) --  5000 ml   Tolerated Treatment --  Good   Treatment complete. Stable treatment. Pt tolerated fluid removal well. Dressing clean, dry and intact. Report called to primary RN. Treatment sheet printed to be scanned into EMR.

## 2018-08-06 NOTE — H&P
deformity. Skin: Skin color, texture, turgor normal. Right AV fistula in right AC with good thrill and bruit  Neurologic:  Neurovascularly intact without any focal sensory/motor deficits. Cranial nerves: II-XII intact, grossly non-focal.  Psychiatric:  Alert and oriented, thought content appropriate, normal insight  Capillary Refill: Brisk,< 3 seconds   Peripheral Pulses: +2 palpable, equal bilaterally       Labs:     Recent Labs      08/05/18   1814   WBC  4.6*   HGB  6.9*   HCT  22.1*   PLT  139     Recent Labs      08/05/18   1814   NA  142   K  5.7*   CL  98   CO2  23   BUN  120*   CREATININE  12.5*   CALCIUM  8.4*   PHOS  4.8*     Recent Labs      08/05/18   1814   AST  8   ALT  <5*   BILITOT  0.3   ALKPHOS  92     No results for input(s): INR in the last 72 hours. No results for input(s): Marthenia Olman in the last 72 hours. Urinalysis:      Lab Results   Component Value Date    NITRU NEGATIVE 04/03/2018    WBCUA 2-4 04/03/2018    BACTERIA NONE 04/03/2018    RBCUA 5-10 04/03/2018    BLOODU SMALL 04/03/2018    SPECGRAV 1.014 03/07/2018    GLUCOSEU NEGATIVE 04/03/2018         Radiology:       XR CHEST STANDARD (2 VW)   Final Result   Small to moderate left-sided pleural effusion with associated opacities which may represent atelectasis or infiltrate. **This report has been created using voice recognition software. It may contain minor errors which are inherent in voice recognition technology. **      Final report electronically signed by Dr Asia Sanders on 8/5/2018 6:50 PM               DVT prophylaxis: [] Lovenox                                 [x] SCDs                                 [x] SQ Heparin                                 [] Encourage ambulation           [] Already on Anticoagulation    Code Status: Prior        Disposition:    [x] Home       [] TCU       [] Rehab       [] Psych       [] SNF       [] [place]       [] Other-    ASSESSMENT:    Active Hospital Problems Diagnosis Date Noted    Fluid overload [E87.70] 08/05/2018       PLAN:    48 y.o. male who presented to [facility] with cocaine abuse, ESRD, HD TTS, DMII, AAA, lipidemia, PTSD, FSGS, and HTN is admitted for fluid overloaded secondary to missed HD. 1) Fluid overloaded: Chest xray disclosed Small to moderate left-sided pleural effusion with associated opacities which may represent atelectasis or infiltrate. Pt is 12 kg above his EDW secondary to missed multiple HD sessions. Consulted Dr. Darek Reyna for HD. Tolerated HD. UF 4L noted. Another HD session in AM per nephrology. 2) ESRD: HD TTS. Resume HD per nephrology. Monitor. 3) Hyperkalemia: K 5.7  HD on a 2K bath noted. Monitor. 4) HTN: Resume home SBP meds. Probably volume related. Added labetalol and hydralazine. HD will help with SBP control as well. 5) Dispo: Probably can be discharge tomorrow after HD. Thank you No primary care provider on file. for the opportunity to be involved in this patient's care.     Electronically signed by Irma Gallo MD on 8/5/2018 at 9:00 PM

## 2018-08-06 NOTE — PROGRESS NOTES
Pt admitted to  6K8 in a wheelchair. Complaints: Fluid Overload. IV none infusing into the antecubital right, condition patent and no redness at a rate of 0 mls/ hour with about 0 mls in the bag still. IV site free of s/s of infection or infiltration. Vital signs obtained. Assessment and data collection initiated. Two nurse skin assessment performed by Jeet HAQUE and Maddison HAQUE. Oriented to room. Policies and procedures for  explained. All questions answered with no further questions at this time. Fall prevention and safety brochure discussed with patient. Bed alarm on. Call light in reach. The best day to schedule a follow up Dr appointment is:  Monday a.m.

## 2018-08-06 NOTE — PROGRESS NOTES
98.1 °F (36.7 °C) (Oral)   Resp 20   Ht 6' 3\" (1.905 m)   Wt 214 lb 15.2 oz (97.5 kg)   SpO2 97%   BMI 26.87 kg/m²    Wt Readings from Last 3 Encounters:   08/06/18 214 lb 15.2 oz (97.5 kg)   04/03/18 211 lb (95.7 kg)   03/27/18 211 lb 9.6 oz (96 kg)      24HR INTAKE/OUTPUT:    Intake/Output Summary (Last 24 hours) at 08/06/18 0728  Last data filed at 08/05/18 2350   Gross per 24 hour   Intake              400 ml   Output             4400 ml   Net            -4000 ml       Constitutional:  Alert, awake, no apparent distress   Skin:normal   HEENT:Pupils are reactive . Throat is clear   Neck:supple with no thyromegaly  Cardiovascular:  S1, S2 with 3/6 systolic murmur   Respiratory:  Clear  Abdomen: +bs, soft, non tender   Ext: No LE edema  Musculoskeletal:Intact  Neuro:Alert and oriented with no deficit      Electronically signed by Yusuf Nieves MD on 8/6/2018 at 7:28 AM

## 2018-08-10 ENCOUNTER — TELEPHONE (OUTPATIENT)
Dept: NEPHROLOGY | Age: 53
End: 2018-08-10

## 2018-08-18 ENCOUNTER — HOSPITAL ENCOUNTER (EMERGENCY)
Age: 53
Discharge: HOME | End: 2018-08-18
Attending: EMERGENCY MEDICINE
Payer: MEDICARE

## 2018-08-18 VITALS
SYSTOLIC BLOOD PRESSURE: 170 MMHG | WEIGHT: 210 LBS | RESPIRATION RATE: 16 BRPM | HEART RATE: 86 BPM | TEMPERATURE: 99.4 F | DIASTOLIC BLOOD PRESSURE: 94 MMHG | OXYGEN SATURATION: 96 % | BODY MASS INDEX: 26.11 KG/M2 | HEIGHT: 75 IN

## 2018-08-18 DIAGNOSIS — D63.8 ANEMIA OF CHRONIC DISEASE: Primary | ICD-10-CM

## 2018-08-18 LAB
ABO: NORMAL
ANION GAP SERPL CALCULATED.3IONS-SCNC: 12 MEQ/L (ref 8–16)
ANTIBODY SCREEN: NORMAL
BUN BLDV-MCNC: 30 MG/DL (ref 7–22)
CALCIUM SERPL-MCNC: 9 MG/DL (ref 8.5–10.5)
CHLORIDE BLD-SCNC: 97 MEQ/L (ref 98–111)
CO2: 31 MEQ/L (ref 23–33)
CREAT SERPL-MCNC: 4.4 MG/DL (ref 0.4–1.2)
GFR SERPL CREATININE-BSD FRML MDRD: 17 ML/MIN/1.73M2
GLUCOSE BLD-MCNC: 83 MG/DL (ref 70–108)
GLUCOSE BLD-MCNC: 83 MG/DL (ref 70–108)
OSMOLALITY CALCULATION: 284.7 MOSMOL/KG (ref 275–300)
POTASSIUM SERPL-SCNC: 4.2 MEQ/L (ref 3.5–5.2)
RH FACTOR: NORMAL
RH FACTOR: NORMAL
SCAN OF BLOOD SMEAR: NORMAL
SODIUM BLD-SCNC: 140 MEQ/L (ref 135–145)

## 2018-08-18 PROCEDURE — 2709999900 HC NON-CHARGEABLE SUPPLY

## 2018-08-18 PROCEDURE — P9016 RBC LEUKOCYTES REDUCED: HCPCS

## 2018-08-18 PROCEDURE — 86901 BLOOD TYPING SEROLOGIC RH(D): CPT

## 2018-08-18 PROCEDURE — 82948 REAGENT STRIP/BLOOD GLUCOSE: CPT

## 2018-08-18 PROCEDURE — 80048 BASIC METABOLIC PNL TOTAL CA: CPT

## 2018-08-18 PROCEDURE — 36430 TRANSFUSION BLD/BLD COMPNT: CPT

## 2018-08-18 PROCEDURE — 86900 BLOOD TYPING SEROLOGIC ABO: CPT

## 2018-08-18 PROCEDURE — 99283 EMERGENCY DEPT VISIT LOW MDM: CPT

## 2018-08-18 PROCEDURE — 86922 COMPATIBILITY TEST ANTIGLOB: CPT

## 2018-08-18 PROCEDURE — 85025 COMPLETE CBC W/AUTO DIFF WBC: CPT

## 2018-08-18 PROCEDURE — 36415 COLL VENOUS BLD VENIPUNCTURE: CPT

## 2018-08-18 PROCEDURE — 86850 RBC ANTIBODY SCREEN: CPT

## 2018-08-18 RX ORDER — 0.9 % SODIUM CHLORIDE 0.9 %
250 INTRAVENOUS SOLUTION INTRAVENOUS ONCE
Status: DISCONTINUED | OUTPATIENT
Start: 2018-08-18 | End: 2018-08-18 | Stop reason: HOSPADM

## 2018-08-18 ASSESSMENT — ENCOUNTER SYMPTOMS
ABDOMINAL PAIN: 0
RHINORRHEA: 0
VOMITING: 0
NAUSEA: 0
DIARRHEA: 0
COUGH: 0
WHEEZING: 0
EYE REDNESS: 0
BACK PAIN: 0
EYE DISCHARGE: 0
SORE THROAT: 0
SHORTNESS OF BREATH: 0

## 2018-08-18 ASSESSMENT — PAIN DESCRIPTION - LOCATION: LOCATION: HEAD

## 2018-08-18 ASSESSMENT — PAIN DESCRIPTION - DESCRIPTORS: DESCRIPTORS: ACHING

## 2018-08-18 ASSESSMENT — PAIN DESCRIPTION - PAIN TYPE: TYPE: ACUTE PAIN

## 2018-08-18 ASSESSMENT — PAIN DESCRIPTION - FREQUENCY: FREQUENCY: CONTINUOUS

## 2018-08-18 ASSESSMENT — PAIN SCALES - GENERAL: PAINLEVEL_OUTOF10: 7

## 2018-08-18 NOTE — ED PROVIDER NOTES
HISTORY    has a past medical history of AAA (abdominal aortic aneurysm) ([facility] [address] Acute on chronic diastolic CHF (congestive heart failure), NYHA class 2 ([facility] [address] NURIS (acute kidney injury) ([facility] [address] Anemia associated with chronic renal failure; Arthritis; Cocaine abuse; Depression; Diabetes mellitus ([facility] [address] Diastolic heart failure secondary to coronary artery disease (HCC); FSGS (focal segmental glomerulosclerosis); Hemodialysis patient [facility]); Hemorrhoids; History of blood transfusion; Hyperlipidemia; Hyperphosphatemia; Hypertension; Left renal artery stenosis ([facility] [address] Monoclonal (M) protein disease, multiple 'M' protein; Nicotine dependence; Noncompliance; Pneumonia; Psychiatric problem; PTSD (post-traumatic stress disorder); Secondary hyperparathyroidism (of renal origin); and Sleep apnea. SURGICAL HISTORY      has a past surgical history that includes Leg Tendon Surgery; EKG 12 Lead (9/24/2015); Dialysis fistula creation (Left, 07/08/2016); vascular surgery (Left, 07/08/2016); and vascular surgery (Right, 1990).     CURRENT MEDICATIONS       Discharge Medication List as of 8/18/2018  6:07 PM      CONTINUE these medications which have NOT CHANGED    Details   hydrOXYzine (ATARAX) 50 MG tablet Take 50 mg by mouth 3 times daily as needed for ItchingHistorical Med      famotidine (PEPCID) 20 MG tablet Take 1 tablet by mouth daily, Disp-60 tablet, R-3Print      vitamin D (CHOLECALCIFEROL) 1000 UNIT TABS tablet Take 2 tablets by mouth 2 times daily, Disp-60 tablet, R-0Print      aspirin 81 MG EC tablet Take 1 tablet by mouth daily, Disp-7 tablet, R-0Print      isosorbide mononitrate (IMDUR) 120 MG extended release tablet Take 1 tablet by mouth daily, Disp-7 tablet, R-0Print      traZODone (DESYREL) 50 MG tablet Take 1 tablet by mouth nightly as needed for Sleep, Disp-7 tablet, R-0Print      atorvastatin (LIPITOR) 40 MG tablet Take 1 tablet by mouth nightly, Disp-7 tablet, R-0Print      Multiple Vitamins-Minerals (THERAPEUTIC MULTIVITAMIN-MINERALS) tablet Take 1 tablet by mouth daily      cloNIDine (CATAPRES) 0.3 MG tablet Take 1 tablet by mouth 3 times daily for 7 days, Disp-21 tablet, R-0Print      sertraline (ZOLOFT) 100 MG tablet Take 1 tablet by mouth daily for 7 days, Disp-7 tablet, R-0Print      doxazosin (CARDURA) 8 MG tablet Take 1 tablet by mouth every 12 hours for 7 days, Disp-14 tablet, R-0Print      minoxidil (LONITEN) 10 MG tablet Take 1.5 tablets by mouth every 12 hours for 7 days, Disp-21 tablet, R-0Print      verapamil (CALAN SR) 240 MG extended release tablet Take 1 tablet by mouth daily for 7 days, Disp-7 tablet, R-0Print      sevelamer (RENVELA) 800 MG tablet Take 2 tablets by mouth 3 times daily (with meals) for 7 days, Disp-42 tablet, R-0Print      fluticasone (FLONASE) 50 MCG/ACT nasal spray 1 spray by Nasal route 2 times daily Historical Med             ALLERGIES     is allergic to humalog [insulin lispro]; insulin regular human; and lisinopril. FAMILY HISTORY     indicated that his mother is . He indicated that his father is . He indicated that his brother is . family history includes Cancer in his mother; Other in his brother. SOCIAL HISTORY      reports that he has quit smoking. His smoking use included Cigarettes. He started smoking about 32 years ago. He has a 5.00 pack-year smoking history. He has never used smokeless tobacco. He reports that he uses drugs, including Cocaine. He reports that he does not drink alcohol. PHYSICAL EXAM     INITIAL VITALS:  height is 6' 3\" (1.905 m) and weight is 210 lb (95.3 kg). His oral temperature is 99.4 °F (37.4 °C). His blood pressure is 170/94 (abnormal) and his pulse is 86. His respiration is 16 and oxygen saturation is 96%. Physical Exam   Constitutional: He is oriented to person, place, and time. He appears well-developed and well-nourished. Non-toxic appearance. HENT:   Head: Normocephalic and atraumatic.    Right Ear: Tympanic membrane and external ear normal.   Left Ear: Tympanic membrane and external ear normal.   Nose: Nose normal.   Mouth/Throat: Oropharynx is clear and moist and mucous membranes are normal. No oropharyngeal exudate, posterior oropharyngeal edema or posterior oropharyngeal erythema. Eyes: Conjunctivae and EOM are normal.   Neck: Normal range of motion. Neck supple. No JVD present. Cardiovascular: Normal rate, regular rhythm, intact distal pulses and normal pulses. Exam reveals no gallop and no friction rub. Murmur heard. Systolic murmur is present   Pulmonary/Chest: Effort normal and breath sounds normal. No respiratory distress. He has no decreased breath sounds. He has no wheezes. He has no rhonchi. He has no rales. Abdominal: Soft. Bowel sounds are normal. He exhibits no distension. There is no tenderness. There is no rebound, no guarding and no CVA tenderness. Musculoskeletal: Normal range of motion. He exhibits no edema. Neurological: He is alert and oriented to person, place, and time. He exhibits normal muscle tone. Coordination normal.   Skin: Skin is warm and dry. No rash noted. He is not diaphoretic. Dialysis fistula to the left upper extremity. Nursing note and vitals reviewed.       DIFFERENTIAL DIAGNOSIS:   Include and are not limited to: chronic anemia, electrolyte imbalance, dehydration    DIAGNOSTIC RESULTS     EKG: All EKG's are interpreted by the Emergency Department Physician who either signs or Co-signs this chart in the absence of a cardiologist.  EKG interpreted by Elna Mcnamara DO:    None    RADIOLOGY: non-plain film images(s) such as CT, Ultrasound and MRI are read by the radiologist.    No orders to display       LABS:   Labs Reviewed   CBC WITH AUTO DIFFERENTIAL - Abnormal; Notable for the following:        Result Value    WBC 4.2 (*)     RBC 2.24 (*)     Hemoglobin 6.4 (*)     Hematocrit 20.5 (*)     MCHC 31.2 (*)     MPV 8.9 (*)     All other components within normal

## 2018-08-19 LAB
ANISOCYTOSIS: PRESENT
BASOPHILS # BLD: 0.2 %
BASOPHILS ABSOLUTE: 0 THOU/MM3 (ref 0–0.1)
DIFFERENTIAL TYPE: ABNORMAL
EOSINOPHIL # BLD: 2.1 %
EOSINOPHILS ABSOLUTE: 0.1 THOU/MM3 (ref 0–0.4)
ERYTHROCYTE [DISTWIDTH] IN BLOOD BY AUTOMATED COUNT: 13.4 % (ref 11.5–14.5)
ERYTHROCYTE [DISTWIDTH] IN BLOOD BY AUTOMATED COUNT: 44.6 FL (ref 35–45)
HCT VFR BLD CALC: 20.5 % (ref 42–52)
HEMOGLOBIN: 6.4 GM/DL (ref 14–18)
IMMATURE GRANS (ABS): 0.01 THOU/MM3 (ref 0–0.07)
IMMATURE GRANULOCYTES: 0.2 %
LYMPHOCYTES # BLD: 13 %
LYMPHOCYTES ABSOLUTE: 0.5 THOU/MM3 (ref 1–4.8)
MCH RBC QN AUTO: 28.6 PG (ref 26–33)
MCHC RBC AUTO-ENTMCNC: 31.2 GM/DL (ref 32.2–35.5)
MCV RBC AUTO: 91.5 FL (ref 80–94)
MONOCYTES # BLD: 11.6 %
MONOCYTES ABSOLUTE: 0.5 THOU/MM3 (ref 0.4–1.3)
NUCLEATED RED BLOOD CELLS: 0 /100 WBC
PATHOLOGIST REVIEW: ABNORMAL
PLATELET # BLD: 136 THOU/MM3 (ref 130–400)
PMV BLD AUTO: 8.9 FL (ref 9.4–12.4)
RBC # BLD: 2.24 MILL/MM3 (ref 4.7–6.1)
SEG NEUTROPHILS: 72.9 %
SEGMENTED NEUTROPHILS ABSOLUTE COUNT: 3.1 THOU/MM3 (ref 1.8–7.7)
WBC # BLD: 4.2 THOU/MM3 (ref 4.8–10.8)

## 2018-08-21 ENCOUNTER — HOSPITAL ENCOUNTER (OUTPATIENT)
Dept: NURSING | Age: 53
Discharge: HOME | End: 2018-08-21
Payer: MEDICARE

## 2018-08-21 VITALS
OXYGEN SATURATION: 94 % | HEART RATE: 87 BPM | RESPIRATION RATE: 18 BRPM | TEMPERATURE: 98.6 F | SYSTOLIC BLOOD PRESSURE: 176 MMHG | DIASTOLIC BLOOD PRESSURE: 90 MMHG

## 2018-08-21 LAB
ABO: NORMAL
ANTIBODY SCREEN: NORMAL
RH FACTOR: NORMAL
RH FACTOR: NORMAL

## 2018-08-21 PROCEDURE — 2709999900 HC NON-CHARGEABLE SUPPLY

## 2018-08-21 PROCEDURE — 86923 COMPATIBILITY TEST ELECTRIC: CPT

## 2018-08-21 PROCEDURE — 36415 COLL VENOUS BLD VENIPUNCTURE: CPT

## 2018-08-21 PROCEDURE — 86901 BLOOD TYPING SEROLOGIC RH(D): CPT

## 2018-08-21 PROCEDURE — P9016 RBC LEUKOCYTES REDUCED: HCPCS

## 2018-08-21 PROCEDURE — 36430 TRANSFUSION BLD/BLD COMPNT: CPT

## 2018-08-21 PROCEDURE — 2580000003 HC RX 258: Performed by: INTERNAL MEDICINE

## 2018-08-21 PROCEDURE — 86900 BLOOD TYPING SEROLOGIC ABO: CPT

## 2018-08-21 PROCEDURE — 86850 RBC ANTIBODY SCREEN: CPT

## 2018-08-21 RX ORDER — 0.9 % SODIUM CHLORIDE 0.9 %
250 INTRAVENOUS SOLUTION INTRAVENOUS ONCE
Status: COMPLETED | OUTPATIENT
Start: 2018-08-21 | End: 2018-08-22

## 2018-08-21 RX ADMIN — SODIUM CHLORIDE 250 ML: 9 INJECTION, SOLUTION INTRAVENOUS at 13:00

## 2018-08-21 ASSESSMENT — PAIN - FUNCTIONAL ASSESSMENT: PAIN_FUNCTIONAL_ASSESSMENT: 0-10

## 2018-08-21 NOTE — PROGRESS NOTES
1245:  Arrives ambulatory POST  DIALYSIS  for 1 unit PRC. PROCESS REVIEWED AND PT RIGHTS AND RESPONSIBILITIES OFFERED TO PT.    3684:  LUNGS WITH CRACKLES IN BASES  1335:  NO SIGNS REACTION NOTED. LUNGS REMAIN WITH CRACKLES IN BASES.   1340:  EATING LUNCH  1430:  RESTING WITHOUT COMPLAITNTS

## 2018-08-21 NOTE — PROGRESS NOTES
1500 pt tolerating infusion well. Denies needs at this time. [phone] in phlebotomy notified that pt will ready to be drawn at 1600. Reshma Jauregui verbalized understanding. [phone] called again, reminded that pt is ready to be typed and screened. Reshma Jauregui states she is on her way. 1615 infusion complete. Pt tolerated it well with no complaints. [place] 30 in to draw type and screen. 1640 pt discharged ambulatory with wife with instructions with no complaints. Pt reminded to keep green blood wrist band on until tomorrow's transfusion.  Pt verbalized understanding.         __m__ Safety:       (Environmental)   [place] to environment   Ensure ID band is correct and in place/ allergy band as needed   Assess for fall risk   Initiate fall precautions as applicable (fall band, side rails, etc.)   Call light within reach   Bed in low position/ wheels locked    __m__ Pain:        Assess pain level and characteristics   Administer analgesics as ordered   Assess effectiveness of pain management and report to MD as needed    __m__ Knowledge Deficit:   Assess baseline knowledge   Provide teaching at level of understanding   Provide teaching via preferred learning method   Evaluate teaching effectiveness    __m__ Hemodynamic/Respiratory Status:       (Pre and Post Procedure Monitoring)   Assess/Monitor vital signs and LOC   Assess Baseline SpO2 prior to any sedation   Obtain weight/height   Assess vital signs/ LOC until patient meets discharge criteria   Monitor procedure site and notify MD of any issues

## 2018-08-22 ENCOUNTER — HOSPITAL ENCOUNTER (OUTPATIENT)
Dept: NURSING | Age: 53
Discharge: HOME | End: 2018-08-22
Payer: MEDICARE

## 2018-08-22 VITALS
DIASTOLIC BLOOD PRESSURE: 79 MMHG | TEMPERATURE: 98.7 F | OXYGEN SATURATION: 96 % | SYSTOLIC BLOOD PRESSURE: 147 MMHG | HEART RATE: 89 BPM | RESPIRATION RATE: 16 BRPM

## 2018-08-22 PROCEDURE — 36430 TRANSFUSION BLD/BLD COMPNT: CPT

## 2018-08-22 PROCEDURE — 2580000003 HC RX 258: Performed by: INTERNAL MEDICINE

## 2018-08-22 PROCEDURE — P9016 RBC LEUKOCYTES REDUCED: HCPCS

## 2018-08-22 PROCEDURE — 2709999900 HC NON-CHARGEABLE SUPPLY

## 2018-08-22 RX ORDER — 0.9 % SODIUM CHLORIDE 0.9 %
250 INTRAVENOUS SOLUTION INTRAVENOUS ONCE
Status: COMPLETED | OUTPATIENT
Start: 2018-08-22 | End: 2018-08-22

## 2018-08-22 RX ADMIN — SODIUM CHLORIDE 250 ML: 9 INJECTION, SOLUTION INTRAVENOUS at 09:00

## 2018-08-22 ASSESSMENT — PAIN - FUNCTIONAL ASSESSMENT: PAIN_FUNCTIONAL_ASSESSMENT: 0-10

## 2018-08-22 ASSESSMENT — PAIN SCALES - GENERAL: PAINLEVEL_OUTOF10: 0

## 2018-08-22 NOTE — PROGRESS NOTES
12:  ARRIVES AMBULATORY FOR SECOND UNIT PRC. PT STATES HE FEELS BETTER TODAY. PROCESS REVIEWED AND PT RIGHTS AND RESPONSIBILITIES OFFERED TO PT.  4215: LUNGS DIMINISHED IN BASES. PRC STARTED INTO EXISTING INT RFA. NO PROBLEMS NOTED. SNACK TAKEN. 1030:  Resting quietly no distress noted  1130:  LUNCH TAKEN  1215:  PT TOLERATED BLOOD WELL AND DISCHARGED AMBULATORY WITH INSTRUCTIONS.   PT STATES HE FEELS A LOT BETTER.    __M__ Safety:       (Environmental)   [place] to environment   Ensure ID band is correct and in place/ allergy band as needed   Assess for fall risk   Initiate fall precautions as applicable (fall band, side rails, etc.)   Call light within reach   Bed in low position/ wheels locked    _M___ Pain:        Assess pain level and characteristics   Administer analgesics as ordered   Assess effectiveness of pain management and report to MD as needed    _M___ Knowledge Deficit:   Assess baseline knowledge   Provide teaching at level of understanding   Provide teaching via preferred learning method   Evaluate teaching effectiveness    _M___ Hemodynamic/Respiratory Status:       (Pre and Post Procedure Monitoring)   Assess/Monitor vital signs and LOC   Assess Baseline SpO2 prior to any sedation   Obtain weight/height   Assess vital signs/ LOC until patient meets discharge criteria   Monitor procedure site and notify MD of any issues    __

## 2018-09-27 ENCOUNTER — TELEPHONE (OUTPATIENT)
Dept: CARDIOTHORACIC SURGERY | Age: 53
End: 2018-09-27

## 2018-09-27 NOTE — TELEPHONE ENCOUNTER
Per referral for new patient visit, called to inform patient of the appointment requested with Dr. Mauricio Norton  Was unable to speak to a person, or leave a message because the number provided has calling restrictions that does not allow calls from unknown numbers. Also, there is no valid mailing address to send appointment reminder to.   Patient has been scheduled per referral,  for an appointment with Dr. Stephany Paz on Oct. 15, 2018 at 2 pm.

## 2018-10-26 ENCOUNTER — TELEPHONE (OUTPATIENT)
Dept: CARDIOTHORACIC SURGERY | Age: 53
End: 2018-10-26

## 2018-10-29 ENCOUNTER — OFFICE VISIT (OUTPATIENT)
Dept: CARDIOTHORACIC SURGERY | Age: 53
End: 2018-10-29
Payer: MEDICARE

## 2018-10-29 VITALS
DIASTOLIC BLOOD PRESSURE: 97 MMHG | HEIGHT: 75 IN | BODY MASS INDEX: 26.96 KG/M2 | HEART RATE: 104 BPM | SYSTOLIC BLOOD PRESSURE: 218 MMHG | WEIGHT: 216.8 LBS

## 2018-10-29 DIAGNOSIS — Z86.79 HISTORY OF REPAIR OF STANFORD TYPE B DISSECTING ANEURYSM OF THORACIC AORTA: ICD-10-CM

## 2018-10-29 DIAGNOSIS — I71.012 DISSECTING ANEURYSM OF THORACIC AORTA, STANFORD TYPE B: Primary | ICD-10-CM

## 2018-10-29 DIAGNOSIS — Z98.890 HISTORY OF REPAIR OF STANFORD TYPE B DISSECTING ANEURYSM OF THORACIC AORTA: ICD-10-CM

## 2018-10-29 DIAGNOSIS — I71.23 DISSECTING ANEURYSM OF THORACIC AORTA, STANFORD TYPE B: Primary | ICD-10-CM

## 2018-10-29 PROCEDURE — G8598 ASA/ANTIPLAT THER USED: HCPCS | Performed by: THORACIC SURGERY (CARDIOTHORACIC VASCULAR SURGERY)

## 2018-10-29 PROCEDURE — 3017F COLORECTAL CA SCREEN DOC REV: CPT | Performed by: THORACIC SURGERY (CARDIOTHORACIC VASCULAR SURGERY)

## 2018-10-29 PROCEDURE — G8419 CALC BMI OUT NRM PARAM NOF/U: HCPCS | Performed by: THORACIC SURGERY (CARDIOTHORACIC VASCULAR SURGERY)

## 2018-10-29 PROCEDURE — 1036F TOBACCO NON-USER: CPT | Performed by: THORACIC SURGERY (CARDIOTHORACIC VASCULAR SURGERY)

## 2018-10-29 PROCEDURE — G8484 FLU IMMUNIZE NO ADMIN: HCPCS | Performed by: THORACIC SURGERY (CARDIOTHORACIC VASCULAR SURGERY)

## 2018-10-29 PROCEDURE — 99204 OFFICE O/P NEW MOD 45 MIN: CPT | Performed by: THORACIC SURGERY (CARDIOTHORACIC VASCULAR SURGERY)

## 2018-10-29 PROCEDURE — G8427 DOCREV CUR MEDS BY ELIG CLIN: HCPCS | Performed by: THORACIC SURGERY (CARDIOTHORACIC VASCULAR SURGERY)

## 2018-10-29 ASSESSMENT — ENCOUNTER SYMPTOMS
GASTROINTESTINAL NEGATIVE: 1
ALLERGIC/IMMUNOLOGIC NEGATIVE: 1
EYES NEGATIVE: 1
RESPIRATORY NEGATIVE: 1

## 2018-10-29 NOTE — PROGRESS NOTES
Cardiovascular: Normal rate, regular rhythm and normal heart sounds. Exam reveals no gallop and no friction rub. No murmur heard. Pulmonary/Chest: Effort normal and breath sounds normal. No stridor. No respiratory distress. He has no wheezes. He has no rales. He exhibits no tenderness. Abdominal: Soft. Bowel sounds are normal. He exhibits no distension and no mass. There is no tenderness. There is no rebound and no guarding. Musculoskeletal: Normal range of motion. He exhibits no edema, tenderness or deformity. Lymphadenopathy:     He has no cervical adenopathy. Neurological: He is alert and oriented to person, place, and time. He has normal reflexes. He displays normal reflexes. No cranial nerve deficit or sensory deficit. He exhibits normal muscle tone. Coordination normal.   Skin: Skin is warm and dry. Capillary refill takes less than 2 seconds. No rash noted. He is not diaphoretic. No erythema. No pallor. Psychiatric: He has a normal mood and affect.  His behavior is normal. Judgment and thought content normal.       Lab Results   Component Value Date    WBC 4.2 08/18/2018    RBC 2.24 08/18/2018    RBC 5.03 02/24/2012    HGB 6.4 08/18/2018    HCT 20.5 08/18/2018    MCV 91.5 08/18/2018    MCH 28.6 08/18/2018    MCHC 31.2 08/18/2018    RDW 15.0 04/03/2018     08/18/2018    MPV 8.9 08/18/2018       Lab Results   Component Value Date     08/18/2018    K 4.2 08/18/2018    K 5.0 08/06/2018    CL 97 08/18/2018    CO2 31 08/18/2018    BUN 30 08/18/2018    LABALBU 3.4 08/06/2018    CREATININE 4.4 08/18/2018    CALCIUM 9.0 08/18/2018    LABGLOM 17 08/18/2018    GLUCOSE 83 08/18/2018    GLUCOSE 104 02/16/2012       Lab Results   Component Value Date    ALKPHOS 91 08/06/2018    ALT <5 08/06/2018    AST 6 08/06/2018    PROT 7.5 08/06/2018    BILITOT 0.3 08/06/2018    BILIDIR <0.2 08/06/2018    LABALBU 3.4 08/06/2018       Lab Results   Component Value Date    MG 2.6 08/05/2018       Lab Results Component Value Date    INR 0.99 03/10/2018    INR 1.00 03/09/2018    INR 1.01 03/08/2018         Lab Results   Component Value Date    LABA1C 5.1 05/04/2017       Lab Results   Component Value Date    TRIG 44 11/09/2016    HDL 78 11/09/2016    LDLCALC 55 11/09/2016       Lab Results   Component Value Date    TSH 1.600 04/03/2018         Testing Reviewed:      I have individually reviewed the below cardiac tests    EKG:     ECHO:  Results for orders placed during the hospital encounter of 03/06/18   Echocardiogram 2D/ M-Mode/ Colorflow/ Do    Narrative Transthoracic Echocardiography Report (TTE)     Demographics      Patient Name    Tim Matute Gender               Male      MR #            [id number]     Race                 Black                                    Ethnicity      Account #       [de-identified]     Room Number          0022      Accession       [id number]     Date of Study        03/07/2018   Number      Date of Birth   1965    Referring Physician  Indigo MUNIZ DO      Age             48 year(s)    Sonographer          Hilary De La Cruz RDCS                                    Interpreting         Echo reader of the week                                 Physician            Sarah Johnston MD     Procedure    Type of Study      TTE procedure:ECHOCARDIOGRAM COMPLETE 2D W DOPPLER W COLOR. Procedure Date  Date: 03/07/2018 Start: 08:15 AM    Study Location: Bedside  Technical Quality: Adequate visualization    Indications:Hypertensive emergency.     Additional Medical History:Smoker, alcohol abuse, cocaine use, hypertension,  diabetes, hypertrophic cardiomyopathy, chronic thoracic aortic dissection    Patient Status: Routine    Height: 74.8 inches Weight: 227.09 pounds BSA: 2.31 m^2 BMI: 28.53 kg/m^2    BP: 158/75 mmHg     Conclusions      Summary   Ejection fraction is visually placed in this encounter. No orders of the defined types were placed in this encounter. Continue follow up of chronic TEVAR graft in [place]    No Follow-up on file.       Electronically signed by Margaret Marshall MD   10/29/2018 at 5:27 PM

## 2019-01-12 ENCOUNTER — HOSPITAL ENCOUNTER (EMERGENCY)
Age: 54
Discharge: HOME | End: 2019-01-12
Attending: EMERGENCY MEDICINE
Payer: MEDICARE

## 2019-01-12 ENCOUNTER — APPOINTMENT (OUTPATIENT)
Dept: CT IMAGING | Age: 54
End: 2019-01-12
Payer: MEDICARE

## 2019-01-12 VITALS
WEIGHT: 200 LBS | DIASTOLIC BLOOD PRESSURE: 92 MMHG | BODY MASS INDEX: 24.87 KG/M2 | HEIGHT: 75 IN | SYSTOLIC BLOOD PRESSURE: 159 MMHG | HEART RATE: 59 BPM | TEMPERATURE: 99.2 F | RESPIRATION RATE: 20 BRPM | OXYGEN SATURATION: 92 %

## 2019-01-12 DIAGNOSIS — K59.00 CONSTIPATION, UNSPECIFIED CONSTIPATION TYPE: Primary | ICD-10-CM

## 2019-01-12 DIAGNOSIS — R10.30 LOWER ABDOMINAL PAIN: ICD-10-CM

## 2019-01-12 LAB
ALBUMIN SERPL-MCNC: 3.3 G/DL (ref 3.5–5.1)
ALP BLD-CCNC: 73 U/L (ref 38–126)
ALT SERPL-CCNC: < 5 U/L (ref 11–66)
ANION GAP SERPL CALCULATED.3IONS-SCNC: 13 MEQ/L (ref 8–16)
AST SERPL-CCNC: 6 U/L (ref 5–40)
BASOPHILS # BLD: 0.3 %
BASOPHILS ABSOLUTE: 0 THOU/MM3 (ref 0–0.1)
BILIRUB SERPL-MCNC: 0.3 MG/DL (ref 0.3–1.2)
BUN BLDV-MCNC: 39 MG/DL (ref 7–22)
CALCIUM SERPL-MCNC: 8.6 MG/DL (ref 8.5–10.5)
CHLORIDE BLD-SCNC: 92 MEQ/L (ref 98–111)
CO2: 36 MEQ/L (ref 23–33)
CREAT SERPL-MCNC: 6.6 MG/DL (ref 0.4–1.2)
EKG ATRIAL RATE: 96 BPM
EKG P AXIS: 41 DEGREES
EKG P-R INTERVAL: 184 MS
EKG Q-T INTERVAL: 416 MS
EKG QRS DURATION: 106 MS
EKG QTC CALCULATION (BAZETT): 525 MS
EKG R AXIS: -43 DEGREES
EKG T AXIS: 78 DEGREES
EKG VENTRICULAR RATE: 96 BPM
EOSINOPHIL # BLD: 1.9 %
EOSINOPHILS ABSOLUTE: 0.1 THOU/MM3 (ref 0–0.4)
ERYTHROCYTE [DISTWIDTH] IN BLOOD BY AUTOMATED COUNT: 15.5 % (ref 11.5–14.5)
ERYTHROCYTE [DISTWIDTH] IN BLOOD BY AUTOMATED COUNT: 50.8 FL (ref 35–45)
GFR SERPL CREATININE-BSD FRML MDRD: 11 ML/MIN/1.73M2
GLUCOSE BLD-MCNC: 118 MG/DL (ref 70–108)
HCT VFR BLD CALC: 38.6 % (ref 42–52)
HEMOGLOBIN: 11.6 GM/DL (ref 14–18)
IMMATURE GRANS (ABS): 0.01 THOU/MM3 (ref 0–0.07)
IMMATURE GRANULOCYTES: 0.2 %
LIPASE: 27.2 U/L (ref 5.6–51.3)
LYMPHOCYTES # BLD: 11 %
LYMPHOCYTES ABSOLUTE: 0.6 THOU/MM3 (ref 1–4.8)
MCH RBC QN AUTO: 26.9 PG (ref 26–33)
MCHC RBC AUTO-ENTMCNC: 30.1 GM/DL (ref 32.2–35.5)
MCV RBC AUTO: 89.6 FL (ref 80–94)
MONOCYTES # BLD: 12.9 %
MONOCYTES ABSOLUTE: 0.8 THOU/MM3 (ref 0.4–1.3)
NUCLEATED RED BLOOD CELLS: 0 /100 WBC
OSMOLALITY CALCULATION: 291.7 MOSMOL/KG (ref 275–300)
PLATELET # BLD: 164 THOU/MM3 (ref 130–400)
PMV BLD AUTO: 9.2 FL (ref 9.4–12.4)
POTASSIUM REFLEX MAGNESIUM: 4.7 MEQ/L (ref 3.5–5.2)
RBC # BLD: 4.31 MILL/MM3 (ref 4.7–6.1)
SEG NEUTROPHILS: 73.7 %
SEGMENTED NEUTROPHILS ABSOLUTE COUNT: 4.3 THOU/MM3 (ref 1.8–7.7)
SODIUM BLD-SCNC: 141 MEQ/L (ref 135–145)
TOTAL PROTEIN: 6.8 G/DL (ref 6.1–8)
WBC # BLD: 5.9 THOU/MM3 (ref 4.8–10.8)

## 2019-01-12 PROCEDURE — 74176 CT ABD & PELVIS W/O CONTRAST: CPT

## 2019-01-12 PROCEDURE — 85025 COMPLETE CBC W/AUTO DIFF WBC: CPT

## 2019-01-12 PROCEDURE — 83690 ASSAY OF LIPASE: CPT

## 2019-01-12 PROCEDURE — 99284 EMERGENCY DEPT VISIT MOD MDM: CPT

## 2019-01-12 PROCEDURE — 80053 COMPREHEN METABOLIC PANEL: CPT

## 2019-01-12 PROCEDURE — 6370000000 HC RX 637 (ALT 250 FOR IP): Performed by: EMERGENCY MEDICINE

## 2019-01-12 PROCEDURE — 36415 COLL VENOUS BLD VENIPUNCTURE: CPT

## 2019-01-12 RX ORDER — DOCUSATE SODIUM 100 MG/1
100 CAPSULE, LIQUID FILLED ORAL ONCE
Status: COMPLETED | OUTPATIENT
Start: 2019-01-12 | End: 2019-01-12

## 2019-01-12 RX ORDER — SENNOSIDES 8.6 MG
1 TABLET ORAL ONCE
Status: COMPLETED | OUTPATIENT
Start: 2019-01-12 | End: 2019-01-12

## 2019-01-12 RX ORDER — SENNA AND DOCUSATE SODIUM 50; 8.6 MG/1; MG/1
2 TABLET, FILM COATED ORAL DAILY
Qty: 20 TABLET | Refills: 0 | Status: SHIPPED | OUTPATIENT
Start: 2019-01-12 | End: 2019-01-22

## 2019-01-12 RX ADMIN — SENNOSIDES 8.6 MG: 8.6 TABLET, FILM COATED ORAL at 23:08

## 2019-01-12 RX ADMIN — DOCUSATE SODIUM 100 MG: 100 CAPSULE, LIQUID FILLED ORAL at 23:08

## 2019-01-12 ASSESSMENT — ENCOUNTER SYMPTOMS
RHINORRHEA: 0
NAUSEA: 0
EYE REDNESS: 0
ABDOMINAL PAIN: 1
BACK PAIN: 0
SHORTNESS OF BREATH: 0
DIARRHEA: 0
SORE THROAT: 0
VOMITING: 0
COUGH: 0
CONSTIPATION: 1
EYE DISCHARGE: 0
WHEEZING: 0

## 2019-01-12 ASSESSMENT — PAIN DESCRIPTION - LOCATION: LOCATION: ABDOMEN

## 2019-01-12 ASSESSMENT — PAIN DESCRIPTION - FREQUENCY: FREQUENCY: CONTINUOUS

## 2019-01-12 ASSESSMENT — PAIN SCALES - GENERAL: PAINLEVEL_OUTOF10: 8

## 2019-01-12 ASSESSMENT — PAIN DESCRIPTION - DESCRIPTORS: DESCRIPTORS: THROBBING

## 2019-01-12 ASSESSMENT — PAIN DESCRIPTION - PAIN TYPE: TYPE: ACUTE PAIN

## 2019-04-03 ENCOUNTER — HOSPITAL ENCOUNTER (EMERGENCY)
Age: 54
Discharge: HOME | End: 2019-04-03
Payer: MEDICARE

## 2019-04-03 ENCOUNTER — APPOINTMENT (OUTPATIENT)
Dept: GENERAL RADIOLOGY | Age: 54
End: 2019-04-03
Payer: MEDICARE

## 2019-04-03 VITALS
BODY MASS INDEX: 28.31 KG/M2 | RESPIRATION RATE: 16 BRPM | DIASTOLIC BLOOD PRESSURE: 92 MMHG | OXYGEN SATURATION: 100 % | HEIGHT: 72 IN | WEIGHT: 209 LBS | HEART RATE: 97 BPM | SYSTOLIC BLOOD PRESSURE: 168 MMHG

## 2019-04-03 DIAGNOSIS — S61.412A LACERATION OF LEFT HAND, FOREIGN BODY PRESENCE UNSPECIFIED, INITIAL ENCOUNTER: Primary | ICD-10-CM

## 2019-04-03 DIAGNOSIS — M25.642 DECREASED RANGE OF MOTION OF LEFT THUMB: ICD-10-CM

## 2019-04-03 PROCEDURE — 6360000002 HC RX W HCPCS: Performed by: NURSE PRACTITIONER

## 2019-04-03 PROCEDURE — 2709999900 HC NON-CHARGEABLE SUPPLY

## 2019-04-03 PROCEDURE — 90471 IMMUNIZATION ADMIN: CPT | Performed by: NURSE PRACTITIONER

## 2019-04-03 PROCEDURE — 73130 X-RAY EXAM OF HAND: CPT

## 2019-04-03 PROCEDURE — 12002 RPR S/N/AX/GEN/TRNK2.6-7.5CM: CPT

## 2019-04-03 PROCEDURE — 99282 EMERGENCY DEPT VISIT SF MDM: CPT

## 2019-04-03 PROCEDURE — 90715 TDAP VACCINE 7 YRS/> IM: CPT | Performed by: NURSE PRACTITIONER

## 2019-04-03 RX ORDER — CEPHALEXIN 500 MG/1
500 CAPSULE ORAL 2 TIMES DAILY
Qty: 14 CAPSULE | Refills: 0 | Status: SHIPPED | OUTPATIENT
Start: 2019-04-03 | End: 2019-04-10

## 2019-04-03 RX ORDER — LIDOCAINE HYDROCHLORIDE 10 MG/ML
5 INJECTION, SOLUTION INFILTRATION; PERINEURAL ONCE
Status: DISCONTINUED | OUTPATIENT
Start: 2019-04-03 | End: 2019-04-03 | Stop reason: HOSPADM

## 2019-04-03 RX ADMIN — TETANUS TOXOID, REDUCED DIPHTHERIA TOXOID AND ACELLULAR PERTUSSIS VACCINE, ADSORBED 0.5 ML: 5; 2.5; 8; 8; 2.5 SUSPENSION INTRAMUSCULAR at 11:32

## 2019-04-03 ASSESSMENT — ENCOUNTER SYMPTOMS
WHEEZING: 0
VOMITING: 0
ABDOMINAL PAIN: 0
COUGH: 0
BACK PAIN: 0
EYE REDNESS: 0
DIARRHEA: 0
SHORTNESS OF BREATH: 0
NAUSEA: 0
EYE DISCHARGE: 0
RHINORRHEA: 0
SORE THROAT: 0

## 2019-04-03 ASSESSMENT — PAIN SCALES - WONG BAKER: WONGBAKER_NUMERICALRESPONSE: 2

## 2019-04-03 ASSESSMENT — PAIN DESCRIPTION - PAIN TYPE: TYPE: ACUTE PAIN

## 2019-04-03 ASSESSMENT — PAIN DESCRIPTION - ORIENTATION: ORIENTATION: LEFT

## 2019-04-03 ASSESSMENT — PAIN DESCRIPTION - LOCATION: LOCATION: HAND

## 2019-04-03 ASSESSMENT — PAIN SCALES - GENERAL: PAINLEVEL_OUTOF10: 9

## 2019-04-03 NOTE — ED TRIAGE NOTES
Patient ambulatory to room 7 through triage. States chief complaint of laceration. Bleeding controlled. States broke drinking glass. Unsure of last Tdap,  Pt is a diabetic. States no other injury.

## 2019-04-03 NOTE — ED PROVIDER NOTES
[address]       Chief Complaint   Patient presents with    Laceration     Left thumb pad       Nurses Notesreviewed and I agree except as noted in the HPI. HISTORY OF PRESENT ILLNESS   Hugo Boo is a 48 y.o. male who presents to the ED for evaluation of a laceration of the left hand. The patient states to have broken a drinking glass in the sink and reports that he was attempting to remove the broken pieces when he incurred his laceration. He rates his current pain as a 9/10 in severity in aching in character. He denies any radicular pain. Patient's pain is exacerbated secondary to range of motion. He is unsure of when his last tetanus was received. Patient denies any additional injuries. The patient reports no additional symptoms or complaints at this time. HPI was provided by the patient. REVIEW OF SYSTEMS     Review of Systems   Constitutional: Negative for appetite change, chills, fatigue and fever. HENT: Negative for congestion, ear pain, rhinorrhea and sore throat. Eyes: Negative for discharge, redness and visual disturbance. Respiratory: Negative for cough, shortness of breath and wheezing. Cardiovascular: Negative for chest pain, palpitations and leg swelling. Gastrointestinal: Negative for abdominal pain, diarrhea, nausea and vomiting. Genitourinary: Negative for decreased urine volume, difficulty urinating, dysuria and hematuria. Musculoskeletal: Positive for myalgias (left hand). Negative for arthralgias, back pain, joint swelling and neck pain. Skin: Positive for wound (2cm lacearation to the palmar aspect of the left hand). Negative for pallor and rash. Allergic/Immunologic: Negative for environmental allergies. Neurological: Negative for dizziness, syncope, weakness, light-headedness, numbness and headaches. Hematological: Negative for adenopathy. Psychiatric/Behavioral: Negative for confusion and suicidal ideas.  The patient is not nervous/anxious. PAST MEDICAL HISTORY    has a past medical history of AAA (abdominal aortic aneurysm) (HCC), Acute on chronic diastolic CHF (congestive heart failure), NYHA class 2 ([facility] [facility] Anemia associated with chronic renal failure, Arthritis, Cocaine abuse ([facility] [address] Depression, Diabetes mellitus ([facility] Diastolic heart failure secondary to coronary artery disease ([facility] FSGS (focal segmental glomerulosclerosis), Hemodialysis patient ([facility] Hemorrhoids, History of blood transfusion, Hyperlipidemia, Hyperphosphatemia, Hypertension, Left renal artery stenosis ([facility] Monoclonal (M) protein disease, multiple 'M' protein, Nicotine dependence, Noncompliance, Pneumonia, Psychiatric problem, PTSD (post-traumatic stress disorder), Secondary hyperparathyroidism (of renal origin), and Sleep apnea. SURGICAL HISTORY      has a past surgical history that includes Leg Tendon Surgery; EKG 12 Lead (9/24/2015); Dialysis fistula creation (Left, 07/08/2016); vascular surgery (Left, 07/08/2016); and vascular surgery (Right, 1990).     CURRENT MEDICATIONS       Discharge Medication List as of 4/3/2019 11:23 AM      CONTINUE these medications which have NOT CHANGED    Details   hydrOXYzine (ATARAX) 50 MG tablet Take 50 mg by mouth 3 times daily as needed for ItchingHistorical Med      famotidine (PEPCID) 20 MG tablet Take 1 tablet by mouth daily, Disp-60 tablet, R-3Print      vitamin D (CHOLECALCIFEROL) 1000 UNIT TABS tablet Take 2 tablets by mouth 2 times daily, Disp-60 tablet, R-0Print      aspirin 81 MG EC tablet Take 1 tablet by mouth daily, Disp-7 tablet, R-0Print      isosorbide mononitrate (IMDUR) 120 MG extended release tablet Take 1 tablet by mouth daily, Disp-7 tablet, R-0Print      traZODone (DESYREL) 50 MG tablet Take 1 tablet by mouth nightly as needed for Sleep, Disp-7 tablet, R-0Print      atorvastatin (LIPITOR) 40 MG tablet Take 1 tablet by mouth nightly, Disp-7 tablet, R-0Print      cloNIDine (CATAPRES) 0.3 MG tablet Take 1 tablet by mouth 3 times daily for 7 days, Disp-21 tablet, R-0Print      sertraline (ZOLOFT) 100 MG tablet Take 1 tablet by mouth daily for 7 days, Disp-7 tablet, R-0Print      doxazosin (CARDURA) 8 MG tablet Take 1 tablet by mouth every 12 hours for 7 days, Disp-14 tablet, R-0Print      minoxidil (LONITEN) 10 MG tablet Take 1.5 tablets by mouth every 12 hours for 7 days, Disp-21 tablet, R-0Print      verapamil (CALAN SR) 240 MG extended release tablet Take 1 tablet by mouth daily for 7 days, Disp-7 tablet, R-0Print      sevelamer (RENVELA) 800 MG tablet Take 2 tablets by mouth 3 times daily (with meals) for 7 days, Disp-42 tablet, R-0Print      Multiple Vitamins-Minerals (THERAPEUTIC MULTIVITAMIN-MINERALS) tablet Take 1 tablet by mouth daily      fluticasone (FLONASE) 50 MCG/ACT nasal spray 1 spray by Nasal route 2 times daily Historical Med             ALLERGIES     is allergic to humalog [insulin lispro]; insulin regular human; and lisinopril. FAMILY HISTORY     indicated that his mother is . He indicated that his father is . He indicated that his brother is . family history includes Cancer in his mother; Other in his brother.     SOCIAL HISTORY       Social History     Socioeconomic History    Marital status: Legally      Spouse name: Andrea    Number of children: 2    Years of education: 15    Highest education level: Not on file   Occupational History     Employer: [employer]   Social Needs    Financial resource strain: Not on file    Food insecurity:     Worry: Not on file     Inability: Not on file    Transportation needs:     Medical: Not on file     Non-medical: Not on file   Tobacco Use    Smoking status: Former Smoker     Packs/day: 0.25     Years: 20.00     Pack years: 5.00     Types: Cigarettes     Start date: 10/11/1985    Smokeless tobacco: Never Used    Tobacco comment: quit last month Substance and Sexual Activity    Alcohol use: No     Alcohol/week: 2.4 oz     Types: 4 Cans of beer per week     Comment: Patient states he has drank a pint of liquor within the past 31 hours    Drug use: Yes     Types: Cocaine     Comment: hx of    Sexual activity: Yes     Partners: Female   Lifestyle    Physical activity:     Days per week: Not on file     Minutes per session: Not on file    Stress: Not on file   Relationships    Social connections:     Talks on phone: Not on file     Gets together: Not on file     Attends Jewish service: Not on file     Active member of club or organization: Not on file     Attends meetings of clubs or organizations: Not on file     Relationship status: Not on file    Intimate partner violence:     Fear of current or ex partner: Not on file     Emotionally abused: Not on file     Physically abused: Not on file     Forced sexual activity: Not on file   Other Topics Concern    Not on file   Social History Narrative    Not on file       PHYSICAL EXAM     INITIAL VITALS:  height is 6' (1.829 m) and weight is 209 lb (94.8 kg). His blood pressure is 168/92 (abnormal) and his pulse is 97. His respiration is 16 and oxygen saturation is 100%. Physical Exam   Constitutional: He is oriented to person, place, and time. He appears well-developed and well-nourished. HENT:   Head: Normocephalic. Right Ear: External ear normal.   Left Ear: External ear normal.   Nose: Nose normal.   Mouth/Throat: Uvula is midline and oropharynx is clear and moist.   Eyes: Conjunctivae and EOM are normal.   Neck: Normal range of motion. Neck supple. Cardiovascular: Normal rate, regular rhythm, S1 normal, S2 normal, normal heart sounds and intact distal pulses. Pulmonary/Chest: Effort normal and breath sounds normal. No respiratory distress. He exhibits no tenderness. Abdominal: Soft. Normal appearance and bowel sounds are normal. He exhibits no distension. There is no tenderness. MDM    Patient was seen and evaluated in the emergency department, patient appear to be in no acute distress. Vital signs were reviewed, slight hypertension was noticed this is chronic for this patient. Physical exam completed, he had a laceration to the thenar aspect of the left hand. He had decreased range of motion of the thumb he was neurovascularly intact distal area x-rays were obtained which showed no significant abnormality other than soft tissue swelling. I discussed the case with Kisha Pollard CNP who recommended a loose closure and follow-up in their office tomorrow. I discussed with the patient he is amenable with this plan of care. The wound was cleaned and closed in a thumb spica splint was placed. See the procedure note below. All here in the emergency room patient maintained a stable course is appropriate for discharge. Medications   Tetanus-Diphth-Acell Pertussis (BOOSTRIX) injection 0.5 mL (0.5 mLs Intramuscular Given 4/3/19 1132)       Patient was seen independently by myself. The patient's final impression and disposition and plan was determined by myself. CRITICAL CARE:   None    CONSULTS:  Kisha Pollard (CNP - Orthopedics) recommended that the patient's laceration be loosely approximated and advised that he be placed in a splint. She stated that the patient should f/u at [facility] tomorrow morning. PROCEDURES:  Lac Repair  Date/Time: 4/3/2019 10:00 AM  Performed by: WADE Lunsford CNP  Authorized by: WADE Lunsford CNP     Consent:     Consent obtained:  Verbal    Consent given by:  Patient    Risks discussed:  Infection, need for additional repair, nerve damage, pain, retained foreign body, tendon damage, vascular damage and poor wound healing    Alternatives discussed:  No treatment and delayed treatment  Anesthesia (see MAR for exact dosages):      Anesthesia method:  Local infiltration    Local anesthetic:  Lidocaine 1% w/o epi  Laceration details:     Location: Hand    Hand location:  L palm    Length (cm):  3    Depth (mm):  10  Pre-procedure details:     Preparation:  Patient was prepped and draped in usual sterile fashion and imaging obtained to evaluate for foreign bodies  Exploration:     Hemostasis achieved with:  Direct pressure    Wound exploration: wound explored through full range of motion and entire depth of wound probed and visualized      Contaminated: no    Treatment:     Area cleansed with:  Betadine and saline    Amount of cleaning:  Standard    Irrigation solution:  Sterile saline    Irrigation volume:  500    Irrigation method:  Pressure wash    Visualized foreign bodies/material removed: no    Skin repair:     Repair method:  Sutures    Suture size:  4-0    Suture material:  Prolene    Suture technique:  Simple interrupted    Number of sutures:  4  Approximation:     Approximation:  Loose  Post-procedure details:     Dressing:  Sterile dressing and splint for protection    Patient tolerance of procedure: Tolerated well, no immediate complications          FINAL IMPRESSION      1. Laceration of left hand, foreign body presence unspecified, initial encounter    2. Decreased range of motion of left thumb          DISPOSITION/PLAN   Patient discharged in stable condition     PATIENT REFERRED TO:  Megan Lara   [address] [facility] [phone]  Go to   Tomorrow at 1200 for follow up and evaluation      DISCHARGEMEDICATIONS:  Discharge Medication List as of 4/3/2019 11:23 AM      START taking these medications    Details   cephALEXin (KEFLEX) 500 MG capsule Take 1 capsule by mouth 2 times daily for 7 days, Disp-14 capsule, R-0Print             (Please note that portions of this note were completedwith a voice recognition program.  Efforts were made to edit the dictations but occasionally words are mis-transcribed.)    Scribe:  Swati Schaffer4/3/19 10:11 AM Scribing for and in the presence of Arash Esparza CNP.     Signed

## 2019-04-03 NOTE — CARE COORDINATION
disorder, severe, dependence ([address]    Chest pain    Abdominal aortic aneurysm (AAA) without rupture ([address]    Atrial flutter with rapid ventricular response (HCC)    PVD (peripheral vascular disease) ([address]    Dyslipidemia    Fluid overload    Accelerated hypertension     Summary:  Met with Trav, introduced self/role. Presented to ED for evaluation of laceration. No PCP on file, patient declines scheduling PCP appointment. Resides in [facility], receives Dialysis at [facility] Tuesday, Thursday, Saturday. Patient has a history of non compliance, no show to multiple providers, missed dialysis, and non compliant with medications. Patient given list of PCP accepting new patient's and [facility] contact information and hours to schedule appointments. Denies further needs/assistance with transportation, affording medications, or equipment. Plan is for discharge. Scheduled follow up appointment at [facility] on 4/4/19 at 1200, patient and family member at bedside agreeable and verbalized understanding. Discussed repercussions of non compliance and no show history, verbalized understanding. Follow up appointments:    No future appointments.     Review Due Health Maintenance:  Health Maintenance Due   Topic Date Due    Diabetic retinal exam  01/20/1977    HIV screen  01/20/1982    Hepatitis B Vaccine (1 of 3 - Risk Recombivax 3-dose series) 01/20/1986    Diabetic foot exam  06/16/2015    Shingles Vaccine (1 of 2) 01/20/2017    Colon cancer screen colonoscopy  01/20/2017    Lipid screen  11/09/2017    A1C test (Diabetic or Prediabetic)  05/04/2018    Pneumococcal 0-64 years at Risk Vaccine (3 of 3 - PPSV23) 10/06/2018     NAKIA Dodson 11, BSN  [phone]  [phone]

## 2019-04-05 ENCOUNTER — ANESTHESIA (OUTPATIENT)
Dept: OPERATING ROOM | Age: 54
End: 2019-04-05
Payer: MEDICARE

## 2019-04-05 ENCOUNTER — HOSPITAL ENCOUNTER (EMERGENCY)
Age: 54
Discharge: HOME | End: 2019-04-05
Payer: MEDICARE

## 2019-04-05 ENCOUNTER — HOSPITAL ENCOUNTER (OUTPATIENT)
Age: 54
Setting detail: OUTPATIENT SURGERY
Discharge: HOME | End: 2019-04-05
Attending: ORTHOPAEDIC SURGERY | Admitting: ORTHOPAEDIC SURGERY
Payer: MEDICARE

## 2019-04-05 ENCOUNTER — NURSE TRIAGE (OUTPATIENT)
Dept: ADMINISTRATIVE | Age: 54
End: 2019-04-05

## 2019-04-05 ENCOUNTER — ANESTHESIA EVENT (OUTPATIENT)
Dept: OPERATING ROOM | Age: 54
End: 2019-04-05
Payer: MEDICARE

## 2019-04-05 VITALS
HEIGHT: 72 IN | SYSTOLIC BLOOD PRESSURE: 142 MMHG | DIASTOLIC BLOOD PRESSURE: 75 MMHG | TEMPERATURE: 99 F | WEIGHT: 211.2 LBS | HEART RATE: 90 BPM | OXYGEN SATURATION: 92 % | RESPIRATION RATE: 16 BRPM | BODY MASS INDEX: 28.61 KG/M2

## 2019-04-05 VITALS
OXYGEN SATURATION: 95 % | SYSTOLIC BLOOD PRESSURE: 124 MMHG | RESPIRATION RATE: 1 BRPM | DIASTOLIC BLOOD PRESSURE: 59 MMHG

## 2019-04-05 VITALS
WEIGHT: 209 LBS | DIASTOLIC BLOOD PRESSURE: 84 MMHG | HEART RATE: 107 BPM | HEIGHT: 70 IN | SYSTOLIC BLOOD PRESSURE: 161 MMHG | TEMPERATURE: 98.3 F | OXYGEN SATURATION: 93 % | BODY MASS INDEX: 29.92 KG/M2 | RESPIRATION RATE: 16 BRPM

## 2019-04-05 DIAGNOSIS — Z47.89 CAST DISCOMFORT: ICD-10-CM

## 2019-04-05 DIAGNOSIS — S56.022A LACERATION OF FLEXOR MUSCLE, FASCIA AND TENDON OF LEFT THUMB AT FOREARM LEVEL, INITIAL ENCOUNTER: Primary | ICD-10-CM

## 2019-04-05 DIAGNOSIS — G89.18 POST-OP PAIN: Primary | ICD-10-CM

## 2019-04-05 LAB
AMPHETAMINE+METHAMPHETAMINE URINE SCREEN: NEGATIVE
BARBITURATE QUANTITATIVE URINE: NEGATIVE
BENZODIAZEPINE QUANTITATIVE URINE: NEGATIVE
CANNABINOID QUANTITATIVE URINE: NEGATIVE
COCAINE METABOLITE QUANTITATIVE URINE: NEGATIVE
OPIATES, URINE: NEGATIVE
OXYCODONE: NEGATIVE
PHENCYCLIDINE QUANTITATIVE URINE: NEGATIVE

## 2019-04-05 PROCEDURE — 2500000003 HC RX 250 WO HCPCS: Performed by: ORTHOPAEDIC SURGERY

## 2019-04-05 PROCEDURE — 7100000010 HC PHASE II RECOVERY - FIRST 15 MIN: Performed by: ORTHOPAEDIC SURGERY

## 2019-04-05 PROCEDURE — 3700000001 HC ADD 15 MINUTES (ANESTHESIA): Performed by: ORTHOPAEDIC SURGERY

## 2019-04-05 PROCEDURE — 6360000002 HC RX W HCPCS: Performed by: ORTHOPAEDIC SURGERY

## 2019-04-05 PROCEDURE — 80305 DRUG TEST PRSMV DIR OPT OBS: CPT

## 2019-04-05 PROCEDURE — 99282 EMERGENCY DEPT VISIT SF MDM: CPT

## 2019-04-05 PROCEDURE — 29125 APPL SHORT ARM SPLINT STATIC: CPT

## 2019-04-05 PROCEDURE — 7100000011 HC PHASE II RECOVERY - ADDTL 15 MIN: Performed by: ORTHOPAEDIC SURGERY

## 2019-04-05 PROCEDURE — 3600000013 HC SURGERY LEVEL 3 ADDTL 15MIN: Performed by: ORTHOPAEDIC SURGERY

## 2019-04-05 PROCEDURE — 2580000003 HC RX 258: Performed by: ORTHOPAEDIC SURGERY

## 2019-04-05 PROCEDURE — 2709999900 HC NON-CHARGEABLE SUPPLY: Performed by: ORTHOPAEDIC SURGERY

## 2019-04-05 PROCEDURE — 3700000000 HC ANESTHESIA ATTENDED CARE: Performed by: ORTHOPAEDIC SURGERY

## 2019-04-05 PROCEDURE — 6370000000 HC RX 637 (ALT 250 FOR IP): Performed by: NURSE PRACTITIONER

## 2019-04-05 PROCEDURE — 3600000003 HC SURGERY LEVEL 3 BASE: Performed by: ORTHOPAEDIC SURGERY

## 2019-04-05 PROCEDURE — 6360000002 HC RX W HCPCS: Performed by: SPECIALIST

## 2019-04-05 PROCEDURE — 2709999900 HC NON-CHARGEABLE SUPPLY

## 2019-04-05 PROCEDURE — 2500000003 HC RX 250 WO HCPCS: Performed by: SPECIALIST

## 2019-04-05 RX ORDER — HYDROCODONE BITARTRATE AND ACETAMINOPHEN 5; 325 MG/1; MG/1
1 TABLET ORAL EVERY 6 HOURS PRN
Qty: 28 TABLET | Refills: 0 | Status: SHIPPED | OUTPATIENT
Start: 2019-04-05 | End: 2019-04-12

## 2019-04-05 RX ORDER — FENTANYL CITRATE 50 UG/ML
50 INJECTION, SOLUTION INTRAMUSCULAR; INTRAVENOUS EVERY 5 MIN PRN
Status: DISCONTINUED | OUTPATIENT
Start: 2019-04-05 | End: 2019-04-05 | Stop reason: HOSPADM

## 2019-04-05 RX ORDER — SODIUM CHLORIDE 9 MG/ML
INJECTION, SOLUTION INTRAVENOUS ONCE
Status: COMPLETED | OUTPATIENT
Start: 2019-04-05 | End: 2019-04-05

## 2019-04-05 RX ORDER — ONDANSETRON 2 MG/ML
INJECTION INTRAMUSCULAR; INTRAVENOUS PRN
Status: DISCONTINUED | OUTPATIENT
Start: 2019-04-05 | End: 2019-04-05 | Stop reason: SDUPTHER

## 2019-04-05 RX ORDER — FENTANYL CITRATE 50 UG/ML
INJECTION, SOLUTION INTRAMUSCULAR; INTRAVENOUS PRN
Status: DISCONTINUED | OUTPATIENT
Start: 2019-04-05 | End: 2019-04-05 | Stop reason: SDUPTHER

## 2019-04-05 RX ORDER — ONDANSETRON 2 MG/ML
4 INJECTION INTRAMUSCULAR; INTRAVENOUS EVERY 6 HOURS PRN
Status: DISCONTINUED | OUTPATIENT
Start: 2019-04-05 | End: 2019-04-05 | Stop reason: HOSPADM

## 2019-04-05 RX ORDER — SODIUM CHLORIDE 0.9 % (FLUSH) 0.9 %
10 SYRINGE (ML) INJECTION PRN
Status: DISCONTINUED | OUTPATIENT
Start: 2019-04-05 | End: 2019-04-05 | Stop reason: HOSPADM

## 2019-04-05 RX ORDER — OXYCODONE HYDROCHLORIDE AND ACETAMINOPHEN 5; 325 MG/1; MG/1
1 TABLET ORAL ONCE
Status: COMPLETED | OUTPATIENT
Start: 2019-04-05 | End: 2019-04-05

## 2019-04-05 RX ORDER — LABETALOL HYDROCHLORIDE 5 MG/ML
10 INJECTION, SOLUTION INTRAVENOUS EVERY 10 MIN PRN
Status: DISCONTINUED | OUTPATIENT
Start: 2019-04-05 | End: 2019-04-05 | Stop reason: HOSPADM

## 2019-04-05 RX ORDER — GLYCOPYRROLATE 1 MG/5 ML
SYRINGE (ML) INTRAVENOUS PRN
Status: DISCONTINUED | OUTPATIENT
Start: 2019-04-05 | End: 2019-04-05 | Stop reason: SDUPTHER

## 2019-04-05 RX ORDER — MORPHINE SULFATE 2 MG/ML
2 INJECTION, SOLUTION INTRAMUSCULAR; INTRAVENOUS EVERY 5 MIN PRN
Status: DISCONTINUED | OUTPATIENT
Start: 2019-04-05 | End: 2019-04-05 | Stop reason: HOSPADM

## 2019-04-05 RX ORDER — CEPHALEXIN 500 MG/1
500 CAPSULE ORAL 2 TIMES DAILY
Qty: 20 CAPSULE | Refills: 0 | Status: ON HOLD | OUTPATIENT
Start: 2019-04-05 | End: 2019-11-30

## 2019-04-05 RX ORDER — GENTAMICIN SULFATE 40 MG/ML
INJECTION, SOLUTION INTRAMUSCULAR; INTRAVENOUS PRN
Status: DISCONTINUED | OUTPATIENT
Start: 2019-04-05 | End: 2019-04-05 | Stop reason: ALTCHOICE

## 2019-04-05 RX ORDER — PROPOFOL 10 MG/ML
INJECTION, EMULSION INTRAVENOUS PRN
Status: DISCONTINUED | OUTPATIENT
Start: 2019-04-05 | End: 2019-04-05 | Stop reason: SDUPTHER

## 2019-04-05 RX ORDER — SODIUM CHLORIDE 0.9 % (FLUSH) 0.9 %
10 SYRINGE (ML) INJECTION EVERY 12 HOURS SCHEDULED
Status: DISCONTINUED | OUTPATIENT
Start: 2019-04-05 | End: 2019-04-05 | Stop reason: HOSPADM

## 2019-04-05 RX ORDER — HYDROCODONE BITARTRATE AND ACETAMINOPHEN 5; 325 MG/1; MG/1
1 TABLET ORAL EVERY 4 HOURS PRN
Status: DISCONTINUED | OUTPATIENT
Start: 2019-04-05 | End: 2019-04-05 | Stop reason: HOSPADM

## 2019-04-05 RX ORDER — BUPIVACAINE HYDROCHLORIDE 5 MG/ML
INJECTION, SOLUTION EPIDURAL; INTRACAUDAL PRN
Status: DISCONTINUED | OUTPATIENT
Start: 2019-04-05 | End: 2019-04-05 | Stop reason: ALTCHOICE

## 2019-04-05 RX ORDER — SODIUM CHLORIDE 9 MG/ML
INJECTION, SOLUTION INTRAVENOUS CONTINUOUS
Status: DISCONTINUED | OUTPATIENT
Start: 2019-04-05 | End: 2019-04-05 | Stop reason: HOSPADM

## 2019-04-05 RX ORDER — DOCUSATE SODIUM 100 MG/1
100 CAPSULE, LIQUID FILLED ORAL 2 TIMES DAILY
Status: DISCONTINUED | OUTPATIENT
Start: 2019-04-05 | End: 2019-04-05 | Stop reason: HOSPADM

## 2019-04-05 RX ORDER — HYDROCODONE BITARTRATE AND ACETAMINOPHEN 5; 325 MG/1; MG/1
2 TABLET ORAL EVERY 4 HOURS PRN
Status: DISCONTINUED | OUTPATIENT
Start: 2019-04-05 | End: 2019-04-05 | Stop reason: HOSPADM

## 2019-04-05 RX ADMIN — OXYCODONE AND ACETAMINOPHEN 1 TABLET: 5; 325 TABLET ORAL at 19:45

## 2019-04-05 RX ADMIN — FENTANYL CITRATE 25 MCG: 50 INJECTION INTRAMUSCULAR; INTRAVENOUS at 08:19

## 2019-04-05 RX ADMIN — SODIUM CHLORIDE: 9 INJECTION, SOLUTION INTRAVENOUS at 07:31

## 2019-04-05 RX ADMIN — FENTANYL CITRATE 50 MCG: 50 INJECTION INTRAMUSCULAR; INTRAVENOUS at 09:15

## 2019-04-05 RX ADMIN — FENTANYL CITRATE 25 MCG: 50 INJECTION INTRAMUSCULAR; INTRAVENOUS at 08:47

## 2019-04-05 RX ADMIN — FENTANYL CITRATE 100 MCG: 50 INJECTION INTRAMUSCULAR; INTRAVENOUS at 07:51

## 2019-04-05 RX ADMIN — PROPOFOL 100 MG: 10 INJECTION, EMULSION INTRAVENOUS at 08:08

## 2019-04-05 RX ADMIN — Medication 0.1 MG: at 07:51

## 2019-04-05 RX ADMIN — FENTANYL CITRATE 25 MCG: 50 INJECTION INTRAMUSCULAR; INTRAVENOUS at 08:15

## 2019-04-05 RX ADMIN — PROPOFOL 200 MG: 10 INJECTION, EMULSION INTRAVENOUS at 07:53

## 2019-04-05 RX ADMIN — Medication 0.1 MG: at 08:00

## 2019-04-05 RX ADMIN — PROPOFOL 100 MG: 10 INJECTION, EMULSION INTRAVENOUS at 08:38

## 2019-04-05 RX ADMIN — Medication 2 G: at 07:56

## 2019-04-05 RX ADMIN — ONDANSETRON HYDROCHLORIDE 4 MG: 4 INJECTION, SOLUTION INTRAMUSCULAR; INTRAVENOUS at 08:26

## 2019-04-05 RX ADMIN — FENTANYL CITRATE 25 MCG: 50 INJECTION INTRAMUSCULAR; INTRAVENOUS at 08:44

## 2019-04-05 RX ADMIN — FENTANYL CITRATE 50 MCG: 50 INJECTION INTRAMUSCULAR; INTRAVENOUS at 08:54

## 2019-04-05 RX ADMIN — LIDOCAINE HYDROCHLORIDE 80 MG: 20 INJECTION, SOLUTION INTRAVENOUS at 07:53

## 2019-04-05 ASSESSMENT — PULMONARY FUNCTION TESTS
PIF_VALUE: 0
PIF_VALUE: 1
PIF_VALUE: 0
PIF_VALUE: 1
PIF_VALUE: 0
PIF_VALUE: 1
PIF_VALUE: 0
PIF_VALUE: 1
PIF_VALUE: 0

## 2019-04-05 ASSESSMENT — PAIN DESCRIPTION - PAIN TYPE: TYPE: ACUTE PAIN

## 2019-04-05 ASSESSMENT — ENCOUNTER SYMPTOMS
NAUSEA: 0
DIARRHEA: 0
RHINORRHEA: 0
ABDOMINAL PAIN: 0
SORE THROAT: 0
VOMITING: 0
CONSTIPATION: 0

## 2019-04-05 ASSESSMENT — PAIN DESCRIPTION - LOCATION: LOCATION: ARM

## 2019-04-05 ASSESSMENT — PAIN SCALES - GENERAL
PAINLEVEL_OUTOF10: 0
PAINLEVEL_OUTOF10: 10
PAINLEVEL_OUTOF10: 0

## 2019-04-05 ASSESSMENT — PAIN - FUNCTIONAL ASSESSMENT: PAIN_FUNCTIONAL_ASSESSMENT: 0-10

## 2019-04-05 NOTE — ED PROVIDER NOTES
[address]  Pt Name: Mónica Joy  MRN: [id number]  [place] 1965  Date of evaluation: 4/5/2019  Provider: Marietta Cranker, APRN - CNP    CHIEF COMPLAINT       Chief Complaint   Patient presents with    Cast Problem       Nurses Notes reviewed and I agree except as noted in the HPI. HISTORY OF PRESENT ILLNESS    Mónica Joy is a 48 y.o. male whopresents to the emergency department from home for the evaluation of left forearm swelling and pain. He states he cut his hand 2 days ago and cut his tendon. He had surgery this morning and had a cast put on this morning at [facility]. Patient states his knuckles feel tight and he has pain throughout his whole arm. No further complaints at the time of the initial encounter. Triage notes and Nursing notes were reviewed by myself. Any discrepancies are addressed above. REVIEW OF SYSTEMS     Review of Systems   Constitutional: Negative for chills and fever. HENT: Negative for congestion, rhinorrhea and sore throat. Cardiovascular: Negative for chest pain. Gastrointestinal: Negative for abdominal pain, constipation, diarrhea, nausea and vomiting. Genitourinary: Negative for difficulty urinating, dysuria, frequency, hematuria and urgency. Musculoskeletal: Positive for myalgias (left arm pain). Negative for arthralgias and joint swelling. Skin: Negative for wound. Cast on left forearm          All pertinent systems were reviewed and are negative unless indicated in the HPI.     PAST MEDICAL HISTORY    has a past medical history of AAA (abdominal aortic aneurysm) ([address] Acute on chronic diastolic CHF (congestive heart failure), NYHA class 2 ([address] NURIS (acute kidney injury) ([address] Anemia associated with chronic renal failure, Arthritis, Cocaine abuse ([address] Depression, Diabetes mellitus ([address] Diastolic heart failure secondary to coronary artery disease ([address] FSGS (focal segmental glomerulosclerosis), Hemodialysis patient [facility]), Hemorrhoids, History of blood transfusion, Hyperlipidemia, Hyperphosphatemia, Hypertension, Left renal artery stenosis ([address] Monoclonal (M) protein disease, multiple 'M' protein, Nicotine dependence, Noncompliance, Pneumonia, Psychiatric problem, PTSD (post-traumatic stress disorder), Secondary hyperparathyroidism (of renal origin), and Sleep apnea. SURGICAL HISTORY      has a past surgical history that includes Leg Tendon Surgery; EKG 12 Lead (9/24/2015); Dialysis fistula creation (Left, 07/08/2016); vascular surgery (Left, 07/08/2016); vascular surgery (Right, 1990); and Hand tendon surgery (Left, 4/5/2019). CURRENT MEDICATIONS       Discharge Medication List as of 4/5/2019  7:55 PM      CONTINUE these medications which have NOT CHANGED    Details   !! cephALEXin (KEFLEX) 500 MG capsule Take 1 capsule by mouth 2 times daily, Disp-20 capsule, R-0Print      HYDROcodone-acetaminophen (NORCO) 5-325 MG per tablet Take 1 tablet by mouth every 6 hours as needed for Pain for up to 7 days. Intended supply: 7 days.  Take lowest dose possible to manage pain, Disp-28 tablet, R-0Print      !! cephALEXin (KEFLEX) 500 MG capsule Take 1 capsule by mouth 2 times daily for 7 days, Disp-14 capsule, R-0Print      hydrOXYzine (ATARAX) 50 MG tablet Take 50 mg by mouth 3 times daily as needed for ItchingHistorical Med      famotidine (PEPCID) 20 MG tablet Take 1 tablet by mouth daily, Disp-60 tablet, R-3Print      vitamin D (CHOLECALCIFEROL) 1000 UNIT TABS tablet Take 2 tablets by mouth 2 times daily, Disp-60 tablet, R-0Print      aspirin 81 MG EC tablet Take 1 tablet by mouth daily, Disp-7 tablet, R-0Print      isosorbide mononitrate (IMDUR) 120 MG extended release tablet Take 1 tablet by mouth daily, Disp-7 tablet, R-0Print      traZODone (DESYREL) 50 MG tablet Take 1 tablet by mouth nightly as needed for Sleep, Disp-7 tablet, R-0Print      atorvastatin (LIPITOR) 40 MG tablet Take 1 tablet by mouth nightly, Disp-7 tablet, R-0Print      cloNIDine (CATAPRES) 0.3 MG tablet Take 1 tablet by mouth 3 times daily for 7 days, Disp-21 tablet, R-0Print      sertraline (ZOLOFT) 100 MG tablet Take 1 tablet by mouth daily for 7 days, Disp-7 tablet, R-0Print      doxazosin (CARDURA) 8 MG tablet Take 1 tablet by mouth every 12 hours for 7 days, Disp-14 tablet, R-0Print      minoxidil (LONITEN) 10 MG tablet Take 1.5 tablets by mouth every 12 hours for 7 days, Disp-21 tablet, R-0Print      verapamil (CALAN SR) 240 MG extended release tablet Take 1 tablet by mouth daily for 7 days, Disp-7 tablet, R-0Print      sevelamer (RENVELA) 800 MG tablet Take 2 tablets by mouth 3 times daily (with meals) for 7 days, Disp-42 tablet, R-0Print      Multiple Vitamins-Minerals (THERAPEUTIC MULTIVITAMIN-MINERALS) tablet Take 1 tablet by mouth daily      fluticasone (FLONASE) 50 MCG/ACT nasal spray 1 spray by Nasal route 2 times daily Historical Med       !! - Potential duplicate medications found. Please discuss with provider. ALLERGIES     is allergic to humalog [insulin lispro]; insulin regular human; and lisinopril. FAMILY HISTORY     indicated that his mother is . He indicated that his father is . He indicated that his brother is . family history includes Cancer in his mother; Other in his brother. SOCIAL HISTORY      reports that he has quit smoking. His smoking use included cigarettes. He started smoking about 33 years ago. He has a 5.00 pack-year smoking history. He has never used smokeless tobacco. He reports that he has current or past drug history. Drug: Cocaine. He reports that he does not drink alcohol. PHYSICAL EXAM     INITIAL VITALS:  height is 5' 10\" (1.778 m) and weight is 209 lb (94.8 kg). His oral temperature is 98.3 °F (36.8 °C). His blood pressure is 161/84 (abnormal) and his pulse is 107. His respiration is 16 and oxygen saturation is 93%.     Physical Exam   Constitutional: He is oriented to person, place, and time. He appears well-developed and well-nourished. No distress. HENT:   Head: Normocephalic and atraumatic. Right Ear: External ear normal.   Left Ear: External ear normal.   Eyes: Conjunctivae are normal.   Neck: Normal range of motion. Neck supple. No thyromegaly present. Pulmonary/Chest: Effort normal. No respiratory distress. Musculoskeletal: Normal range of motion. He exhibits no edema. Neurological: He is alert and oriented to person, place, and time. No cranial nerve deficit. Skin: Skin is warm and dry. He is not diaphoretic. No erythema. Psychiatric: He has a normal mood and affect. His behavior is normal. Judgment and thought content normal.         DIFFERENTIAL DIAGNOSIS:   Including but not limited to ill fitting cast, post op pain, less likely compartment syndrome    DIAGNOSTIC RESULTS     EKG: AllEKG's are interpreted by the Emergency Department Physician who either signs or Co-signs this chart in the absence of a cardiologist.       RADIOLOGY: non-plain film images(s) such as CT, Ultrasound and MRI are read by the radiologist.  Plain radiographic images are visualized and preliminarily interpreted by the emergencyphysician unless otherwise stated below. No orders to display         LABS:   Labs Reviewed - No data to display      EMERGENCYDEPARTMENT COURSE AND MEDICAL DECISION MAKING:   Vitals:    Vitals:    04/05/19 1838   BP: (!) 161/84   Pulse: 107   Resp: 16   Temp: 98.3 °F (36.8 °C)   TempSrc: Oral   SpO2: 93%   Weight: 209 lb (94.8 kg)   Height: 5' 10\" (1.778 m)         Pertinent Labs & Imaging studies reviewed. (See chart for details)         The patient was seen and evaluated in the ER for cast discomfort and post op pain. The patient lacerated his hand and had surgery today. The cast was extremely tight around the knuckles. Good cap refill. Compartments are soft. Pulses are intact. The cast was removed and I discussed POC with DEMETRICE Miller for ortho. He is agreeable to removal of the cast and follow up with the [facility] during the clinic tomorrow from 730-9 am for cast reapplication. He was treated with a percocet for pain and he was resplinted as close to the casting as possible. Pt verbalized understanding. He is discharged with follow up. Strict return precautions and follow up instructions were discussed with the patient with which the patient agrees     Physical assessment findings, diagnostic testing(s) if applicable, and vital signs reviewed with patient/patient representative. Questions answered. Medications asdirected, including OTC medications for supportive care. Education provided on medications. Differential diagnosis(s) discussed with patient/patient representative. Home care/self care instructions reviewed withpatient/patient representative. Patient is to follow-up with family care provider in 2-3 days if no improvement. Patient is to go to the emergency department if symptoms worsen. Patient/patient representative isaware of care plan, questions answered, verbalizes understanding and is in agreement. ED Medications administered this visit:   Medications   oxyCODONE-acetaminophen (PERCOCET) 5-325 MG per tablet 1 tablet (1 tablet Oral Given 4/5/19 1945)           I have given the patient strict written and verbal instructions about care at home,follow-up, and signs and symptoms of worsening of condition and they did verbalize understanding. Patient was seen independently by myself. The Patient's final impression and disposition and plan was determined by myself. CRITICAL CARE:   none    CONSULTS:  DEMETRICE Wagoner    PROCEDURES:  None    FINAL IMPRESSION      1. Post-op pain    2. Cast discomfort          DISPOSITION/PLAN   DISPOSITION          PATIENT REFERRED TO:  [address] [place] [address]  [phone]  Go in 1 day  at 36 am for recasting.       DISCHARGE

## 2019-04-05 NOTE — TELEPHONE ENCOUNTER
Summary: had surgery today and has a cast on his hand- it is very tight and painful    Had surgery on his hand with Dr Roxann Estes today. Nena Jordynnatanael has a cast on his hand and it is so tight- pain pills aren't working. Tamsen Cooks advise.

## 2019-04-05 NOTE — BRIEF OP NOTE
Brief Postoperative Note  ______________________________________________________________    Patient: Mitch Dias  YOB: 1965  MRN: [id number]  Date of Procedure: 4/5/2019    Pre-Op Diagnosis: LACERATION OF LONG FLEXOR MUSCLE, FASCIA AND TENDON OF LEFT THUMB AT WRIST AND HAND    Post-Op Diagnosis: Same       Procedure(s):  LEFT THUMB FLEXOR TENDON REPAIR    Anesthesia: General    Surgeon(s):  Jena Preciado MD    Assistant: None    Estimated Blood Loss (mL): 867     Complications: None    Specimens:   * No specimens in log *    Implants:  * No implants in log *      Drains:   Hemodialysis Arteriovenous fistula Left Arm (Active)       Findings: left flexor tendon disruption thumb    Jena Preciado MD  Date: 4/5/2019  Time: 9:47 AM

## 2019-04-05 NOTE — H&P
[address] [id number]                       PREOPERATIVE HISTORY AND PHYSICAL    PATIENT NAME: Lilly Silveira                      :        1965  MED REC NO:   [id number]                           ROOM:  ACCOUNT NO:   [de-identified]                           ADMIT DATE: 2019  PROVIDER:     Troy Ventura. Katheryn Paez M.D.    [address]:  Left thumb laceration. HISTORY OF PRESENT ILLNESS:  The patient is a 73-year-old black male  with a known history of left thumb pain status post laceration on  2019. Since that time, he is not been able to flex his thumb. The laceration was treated with primary repair in the emergency room and  I was asked to evaluate him. I recommend repair of the flexor pollicis  longus left thumb. PAST MEDICAL HISTORY:  Shows he is a known renal dialysis patient  secondary to hypertension. He has had a previous right Achilles tendon  repair, he has had an aneurysm repair in the thoracic and abdominal  region. SOCIAL HISTORY:  He does smoke. He occasionally socially drinks  alcohol. His mother had cancer. He reports no other or real problems  except for high blood pressure and the renal dialysis. He is on a CPAP  machine. ALLERGIES:  LISINOPRIL and INSULIN. PRESENT MEDICATIONS:  Include aspirin which he is going to stop,  atorvastatin, Klonopin, doxazosin, Iso-Bid, _____, fluticasone, _____,  minoxidil, sertraline, sevelamer, trazodone. He is on verapamil,  vitamin D.    PHYSICAL EXAMINATION:  GENERAL:  Well-developed, well-nourished black male. HEENT:  Normocephalic, atraumatic. Extraocular muscles are intact. There is no oropharyngitis. LUNGS:  Clear to auscultation. CARDIOVASCULAR:  Reveals regular rate and rhythm. No murmur or gallop. ABDOMEN:  Bowel sounds positive. Abdomen is soft. EXTREMITIES:  As far as left arm he has a fistula in the left arm.   He  has a 1 cm laceration over the area of the palm of the thumb. Sensation  of the thumb is intact. He has no flexion or extension of the thumb. Good overall range of motion of wrist.  Good range of motion of the  shoulder and elbow. Good positive radial pulse. IMAGING DATA:  X-rays are pending, but showed no obvious abnormalities  via report. PLAN:  At this stage, my recommendation is a repair of a flexor pollicis  longus palm of the thumb. Risks and benefits of the procedure have been  discussed including infection symptoms of the finger, repair the tendon  or rerupture. He will be in a cast for approximately four weeks. He  understands risks and benefits and desires to have the above procedure . He understands the preop and post protocols. He will have  above-mentioned procedure at [address] [place] [address] tomorrow. Routine labs and MRSA are pending as well as an EKG. He will have the  above procedure tomorrow. ADITI Alarcon M.D.    D: 04/04/2019 13:29:08       T: 04/04/2019 16:57:28     [email or web address]  Job#: [id number]     Doc#: [id number]    CC:

## 2019-04-05 NOTE — ANESTHESIA PRE PROCEDURE
Department of Anesthesiology  Preprocedure Note       Name:  Glenn Campo   Age:  48 y.o.  :  1965                                          MRN:  [id number]         Date:  2019      Surgeon: Jenn Duncan):  Cleve Youssef MD    Procedure: LEFT THUMB FLEXOR TENDON REPAIR (Left Hand)    Medications prior to admission:   Prior to Admission medications    Medication Sig Start Date End Date Taking?  Authorizing Provider   cephALEXin (KEFLEX) 500 MG capsule Take 1 capsule by mouth 2 times daily for 7 days 4/3/19 4/10/19 Yes Arash Esparza, APRN - CNP   hydrOXYzine (ATARAX) 50 MG tablet Take 50 mg by mouth 3 times daily as needed for Itching   Yes Historical Provider, MD   famotidine (PEPCID) 20 MG tablet Take 1 tablet by mouth daily 3/23/18  Yes Marlo White MD   vitamin D (CHOLECALCIFEROL) 1000 UNIT TABS tablet Take 2 tablets by mouth 2 times daily 3/23/18  Yes Marlo White MD   aspirin 81 MG EC tablet Take 1 tablet by mouth daily 3/23/18  Yes Marlo White MD   isosorbide mononitrate (IMDUR) 120 MG extended release tablet Take 1 tablet by mouth daily 3/24/18  Yes Marlo White MD   traZODone (DESYREL) 50 MG tablet Take 1 tablet by mouth nightly as needed for Sleep 3/23/18  Yes Marlo White MD   atorvastatin (LIPITOR) 40 MG tablet Take 1 tablet by mouth nightly 3/23/18  Yes Marlo White MD   cloNIDine (CATAPRES) 0.3 MG tablet Take 1 tablet by mouth 3 times daily for 7 days 3/23/18 4/5/19 Yes Marlo White MD   doxazosin (CARDURA) 8 MG tablet Take 1 tablet by mouth every 12 hours for 7 days 3/23/18 4/5/19 Yes Marlo White MD   minoxidil (LONITEN) 10 MG tablet Take 1.5 tablets by mouth every 12 hours for 7 days 3/23/18 4/5/19 Yes Marlo White MD   Multiple Vitamins-Minerals (THERAPEUTIC MULTIVITAMIN-MINERALS) tablet Take 1 tablet by mouth daily   Yes Historical Provider, MD   fluticasone (FLONASE) 50 MCG/ACT nasal spray 1 spray by Nasal route 2 times daily    Yes Historical Provider, MD sertraline (ZOLOFT) 100 MG tablet Take 1 tablet by mouth daily for 7 days 3/23/18 8/5/18  Earl Thacker MD   verapamil (CALAN SR) 240 MG extended release tablet Take 1 tablet by mouth daily for 7 days 3/24/18 8/5/18  Earl Thacker MD   sevelamer (RENVELA) 800 MG tablet Take 2 tablets by mouth 3 times daily (with meals) for 7 days 3/23/18 8/5/18  Earl Thacker MD       Current medications:    Current Facility-Administered Medications   Medication Dose Route Frequency Provider Last Rate Last Dose    0.9 % sodium chloride infusion   Intravenous Once Ashley Monge MD        ceFAZolin (ANCEF) 2 g in dextrose 5 % 50 mL IVPB  2 g Intravenous Once Ashley Monge MD         Facility-Administered Medications Ordered in Other Encounters   Medication Dose Route Frequency Provider Last Rate Last Dose    HYDROmorphone (DILAUDID) injection 1 mg  1 mg Intravenous Once Jb Silverman MD        ioversol (OPTIRAY) 51 % injection 100 mL  100 mL Intravenous ONCE PRN Jb Silverman MD        lidocaine (LMX) 4 % cream   Topical Once Jb Silverman MD        midazolam (VERSED) injection 1 mg  1 mg Intravenous Once Jb Silverman MD           Allergies: Allergies   Allergen Reactions    Humalog [Insulin Lispro] Other (See Comments)     Extreme sweating and intolerance. Patient absolutely refuses due to reaction.      Insulin Regular Human Hives    Lisinopril Swelling     Swelling in lips        Problem List:    Patient Active Problem List   Diagnosis Code    FSGS (focal segmental glomerulosclerosis) N05.1    Diabetes mellitus type 2, controlled ([facility] [address] E11.9    Monoclonal (M) protein disease, multiple 'M' protein D89.0    Depression F32.9    PTSD (post-traumatic stress disorder) F43.10    Secondary renal hyperparathyroidism ([facility] [address] N25.81    Substance induced mood disorder ([facility] [address] F19.94    Renal artery stenosis (HCC) with uncontrollable hypertension I70.1    Severe alcohol use disorder (HCC) F10.20    Severe cocaine use disorder (HCC) F14.20    Essential hypertension I10    Hypertension I10    LVH (left ventricular hypertrophy) due to hypertensive disease I11.9    Chronic diastolic heart failure ([facility]) I50.32    FH: HTN (hypertension) Z82.49    Hypertrophic cardiomyopathy (HCC) I42.2    Diet-controlled diabetes mellitus ([facility]) E11.9    A-V fistula ([facility]) I77.0    Chronic thoracic aortic dissection ([facility]) I71.01    Hyperphosphatemia E83.39    Anemia in CKD (chronic kidney disease) N18.9, D63.1    Vitamin D deficiency E55.9    Thrombocytopenia (HCC) D69.6    Personality disorder ([facility]) F60.9    Tobacco abuse H69.5    Metabolic acidosis O25.2    Precordial pain R07.2    ESRD on hemodialysis ([facility]) N18.6, Z99.2    Edema of upper extremity R60.0    ESRD (end stage renal disease) on dialysis ([facility]) N18.6, Z99.2    Hyperglycemia R73.9    Hyperkalemia E87.5    Cocaine abuse, continuous - reports spending $100 on cocaine \"every other day\" F14.10    Homelessness Z59.0    Moderate episode of recurrent major depressive disorder ([facility] [address] [address] F33.1    Pneumonia J18.9    Noncompliance of patient with renal dialysis ([facility] Z91.15    Anemia associated with stage 5 chronic renal failure ([facility]) N18.5, D63.1    Hypertensive emergency I16.1    Cocaine use disorder, severe, dependence ([facility]) F14.20    Alcohol use disorder, severe, dependence ([facility]) F10.20    Chest pain R07.9    Abdominal aortic aneurysm (AAA) without rupture ([facility]) I71.4    Atrial flutter with rapid ventricular response ([facility]) I48.92    PVD (peripheral vascular disease) ([facility]) I73.9    Dyslipidemia E78.5    Fluid overload E87.70    Accelerated hypertension I10       Past Medical History:        Diagnosis Date    AAA (abdominal aortic aneurysm) ([facility])     Acute on chronic diastolic CHF (congestive heart failure), NYHA class 2 ([facility])     NURIS (acute kidney injury) ([facility] 9/24/2015    Anemia associated with chronic renal failure     Arthritis     stated in hands    Cocaine abuse ([facility] 5/10/2014    Depression     Diabetes mellitus ([facility]     pt states he no longer has diabetes he has lost alot of davion.      Diastolic heart failure secondary to coronary artery disease (HCC)     FSGS (focal segmental glomerulosclerosis) 5/23/2013    Hemodialysis patient ([facility] 10/17/2016    on hemodialysis with [facility] 1/16/2012    History of blood transfusion     Hyperlipidemia     Hyperphosphatemia 5/21/2016    Hypertension     Left renal artery stenosis ([facility] 5/22/2014    Monoclonal (M) protein disease, multiple 'M' protein     Nicotine dependence 6/16/2014    Noncompliance     Pneumonia     Psychiatric problem     PTSD (post-traumatic stress disorder)     [place] vet from [place]    Secondary hyperparathyroidism (of renal origin)     Sleep apnea        Past Surgical History:        Procedure Laterality Date    DIALYSIS FISTULA CREATION Left 07/08/2016    EKG 12-LEAD  9/24/2015         LEG TENDON SURGERY      VASCULAR SURGERY Left 07/08/2016    AV Fistula    VASCULAR SURGERY Right 1990    Surgery on Achilles tendon       Social History:    Social History     Tobacco Use    Smoking status: Former Smoker     Packs/day: 0.25     Years: 20.00     Pack years: 5.00     Types: Cigarettes     Start date: 10/11/1985    Smokeless tobacco: Never Used    Tobacco comment: quit last month   Substance Use Topics    Alcohol use: No     Alcohol/week: 2.4 oz     Types: 4 Cans of beer per week     Comment: Patient states he has drank a pint of liquor within the past 31 hours                                Counseling given: Not Answered  Comment: quit last month      Vital Signs (Current):   Vitals:    04/05/19 0543 04/05/19 0612   BP: 126/61    Pulse: 102    Resp: 16    Temp: 97.9 °F (36.6 °C)    TempSrc: Temporal    SpO2: 94%    Weight: 211 lb 3.2 oz (95.8 kg)    Height:  6' (1.829 m)                                              BP Readings from Last 3 Encounters:   04/05/19 126/61   04/03/19 (!) 168/92   01/12/19 (!) 159/92       NPO Status: Time of last liquid consumption: 0520                        Time of last solid consumption: 2359                        Date of last liquid consumption: 04/05/19                        Date of last solid food consumption: 04/04/19    BMI:   Wt Readings from Last 3 Encounters:   04/05/19 211 lb 3.2 oz (95.8 kg)   04/03/19 209 lb (94.8 kg)   01/12/19 200 lb (90.7 kg)     Body mass index is 28.64 kg/m². CBC:   Lab Results   Component Value Date    WBC 5.9 01/12/2019    RBC 4.31 01/12/2019    RBC 5.03 02/24/2012    HGB 11.6 01/12/2019    HCT 38.6 01/12/2019    MCV 89.6 01/12/2019    RDW 15.0 04/03/2018     01/12/2019       CMP:   Lab Results   Component Value Date     01/12/2019    K 4.7 01/12/2019    CL 92 01/12/2019    CO2 36 01/12/2019    BUN 39 01/12/2019    CREATININE 6.6 01/12/2019    LABGLOM 11 01/12/2019    GLUCOSE 118 01/12/2019    GLUCOSE 104 02/16/2012    PROT 6.8 01/12/2019    CALCIUM 8.6 01/12/2019    BILITOT 0.3 01/12/2019    ALKPHOS 73 01/12/2019    AST 6 01/12/2019    ALT <5 01/12/2019       POC Tests: No results for input(s): POCGLU, POCNA, POCK, POCCL, POCBUN, POCHEMO, POCHCT in the last 72 hours.     Coags:   Lab Results   Component Value Date    INR 0.99 03/10/2018    APTT 32.8 03/23/2018       HCG (If Applicable): No results found for: PREGTESTUR, PREGSERUM, HCG, HCGQUANT     ABGs: No results found for: PHART, PO2ART, GLQ8BQQ, UYS2DVE, BEART, E7QLXCES     Type & Screen (If Applicable):  Lab Results   Component Value Date    [address] WK+ 08/21/2018    [address] WK+ 08/21/2018       Anesthesia Evaluation    Airway: Mallampati: III  TM distance: >3 FB   Neck ROM: full  Mouth opening: > = 3 FB Dental:          Pulmonary:   (+) sleep apnea:  decreased breath sounds,                             Cardiovascular:    (+) hypertension:, CAD:, CHF:, murmur,         Rhythm: regular Neuro/Psych:   (+) psychiatric history:            GI/Hepatic/Renal:   (+) renal disease: ESRD,           Endo/Other:    (+) Diabetes, . Abdominal:   (+) obese,         Vascular:                                        Anesthesia Plan      general     ASA 4 - emergent     (Was dialyzed  Yesterday  History of drug abuse)  Induction: intravenous. MIPS: Postoperative opioids intended and Prophylactic antiemetics administered. Anesthetic plan and risks discussed with patient and spouse. Plan discussed with CRNA.                   Maricruz Tariq MD   4/5/2019

## 2019-04-05 NOTE — TELEPHONE ENCOUNTER
Reason for Disposition   [1] Caller has URGENT question AND [2] triager unable to answer question    Answer Assessment - Initial Assessment Questions  1. SYMPTOM: \"What's the main symptom you're concerned about? \" (e.g., pain, fever, vomiting)      Cast on left arm feels too tight. 2. ONSET: \"When did tightness start? \"      Now.   3. SURGERY: \"What surgery was performed? \"     Tendon repair. 4. DATE of SURGERY: \"When was surgery performed? \"       Today   5. ANESTHESIA: \" What type of anesthesia did you have? \" (e.g., general, spinal, epidural, local)     General   6. PAIN: \"Is there any pain? \" If so, ask: \"How bad is it? \"  (Scale 1-10; or mild, moderate, severe)      Moderate. 7. FEVER: \"Do you have a fever? \" If so, ask: \"What is your temperature, how was it measured, and when did it start? \"      Unknown. He was laying down in bed. 8. VOMITING: \"Is there any vomiting? \" If yes, ask: \"How many times? \"      No   9. BLEEDING: \"Is there any bleeding? \" If so, ask: \"How much? \" and \"Where? \"      No.   10. OTHER SYMPTOMS: \"Do you have any other symptoms? \" (e.g., drainage from wound, painful urination, constipation)       Nothing mentioned.     Protocols used: POST-OP SYMPTOMS AND QUESTIONS-[facility]

## 2019-04-05 NOTE — ANESTHESIA POSTPROCEDURE EVALUATION
Department of Anesthesiology  Postprocedure Note    Patient: Bon Kingsley  MRN: [id number]  YOB: 1965  Date of evaluation: 4/5/2019  Time:  9:35 AM     Procedure Summary     Date:  04/05/19 Room / Location:  [address] [place]    Anesthesia Start:  0748 Anesthesia Stop:  0935    Procedure:  LEFT THUMB FLEXOR TENDON REPAIR (Left Hand) Diagnosis:  (LACERATION OF LONG FLEXOR MUSCLE, FASCIA AND TENDON OF LEFT THUMB AT WRIST AND HAND)    Surgeon:  Ellen Pena MD Responsible Provider:  Earlene Mckeon MD    Anesthesia Type:  general ASA Status:  4 - Emergent          Anesthesia Type: general    Tiara Phase I: Tiara Score: 10    Tiara Phase II:      Last vitals: Reviewed and per EMR flowsheets.        Anesthesia Post Evaluation    Patient location during evaluation: bedside  Patient participation: complete - patient participated  Level of consciousness: awake and alert  Pain score: 0  Airway patency: patent  Nausea & Vomiting: no vomiting and no nausea  Complications: no  Cardiovascular status: hemodynamically stable  Respiratory status: spontaneous ventilation, room air and acceptable  Hydration status: stable

## 2019-04-05 NOTE — OP NOTE
[address] [place] [address]                                OPERATIVE REPORT    PATIENT NAME: Zaid Candelaria                      :        1965  MED REC NO:   [id number]                           ROOM:  ACCOUNT NO:   [de-identified]                           ADMIT DATE: 2019  PROVIDER:     Kirsten Harris M.D.    Caitlyn Jaimeelin2019    PRINCIPAL DIAGNOSIS:  Laceration of left thumb and palm. POSTOPERATIVE DIAGNOSIS:  Laceration of left thumb and palm. OPERATION PERFORMED:  Repair of lacerated flexor pollicis longus, left  thumb. SURGEON:  Kirsten Patel. Ama Harris M.D. FIRST ASSISTANT:  None. COMPLICATIONS:  None. ANESTHESIA:  Local/MAC. CONDITION TO RECOVERY:  Good. SPONGE COUNT:  Correct. INDICATIONS FOR PROCEDURE:  The patient is a 61-year-old black male with  a known history of renal failure, on renal dialysis; who unfortunately  was washing dishes, broke a glass, the glass stabbed him in the area of  the left hand, in the palm area, thenar region to be exact, and  subsequently he could not flex his thumb. He was evaluated in the  emergency room. He had a primary repair with approximately 1.5 inches  laceration in the thenar region. He was placed on antibiotics, but he  never got them. I was asked to evaluate him yesterday. I evaluated and  noted he had flexor pollicis longus laceration in the area of the thenar  region. I recommended repair. The patient agreed to have the above-mentioned procedure. He understood  the risks and benefits, desired to have the above-mentioned procedure,  was brought to the operating suite today for the above-mentioned  procedure. Because of his renal dialysis, we used a forearm tourniquet, because he  had his stent in the left antecubital area and we also placed him on IV  antibiotics i.e. Ancef 2 gm because he was MRSA negative.   He also had a  sort of motion in three months before  he lifts any heavy objects. ADITI Skaggs M.D.    D: 04/05/2019 9:55:04       T: 04/05/2019 12:32:15     [email or web address]  Job#: [id number]     Doc#: [id number]    CC:

## 2019-04-06 NOTE — ED NOTES
Pt stable A&O x 3 given discharge and follow up info. Pt voiced no concerns and discharged from ER to self to home. Pt ambulated out of ER with no complications .        Allison Sanchez RN  04/05/19 0649

## 2019-07-10 ENCOUNTER — HOSPITAL ENCOUNTER (EMERGENCY)
Age: 54
Discharge: HOME | End: 2019-07-10
Payer: MEDICARE

## 2019-07-10 VITALS
RESPIRATION RATE: 20 BRPM | TEMPERATURE: 98.2 F | DIASTOLIC BLOOD PRESSURE: 113 MMHG | OXYGEN SATURATION: 97 % | HEART RATE: 100 BPM | SYSTOLIC BLOOD PRESSURE: 197 MMHG

## 2019-07-10 DIAGNOSIS — L25.9 CONTACT DERMATITIS, UNSPECIFIED CONTACT DERMATITIS TYPE, UNSPECIFIED TRIGGER: Primary | ICD-10-CM

## 2019-07-10 PROCEDURE — 99282 EMERGENCY DEPT VISIT SF MDM: CPT

## 2019-07-10 RX ORDER — TRIAMCINOLONE ACETONIDE 5 MG/G
CREAM TOPICAL
Qty: 1 TUBE | Refills: 1 | Status: SHIPPED | OUTPATIENT
Start: 2019-07-10 | End: 2019-07-17

## 2019-07-10 RX ORDER — PREDNISONE 20 MG/1
20 TABLET ORAL DAILY
Qty: 5 TABLET | Refills: 0 | Status: SHIPPED | OUTPATIENT
Start: 2019-07-10 | End: 2019-07-15

## 2019-07-10 RX ORDER — PREDNISONE 20 MG/1
20 TABLET ORAL ONCE
Status: DISCONTINUED | OUTPATIENT
Start: 2019-07-10 | End: 2019-07-10 | Stop reason: HOSPADM

## 2019-07-10 ASSESSMENT — ENCOUNTER SYMPTOMS
SHORTNESS OF BREATH: 0
NAUSEA: 0
VOMITING: 0
ABDOMINAL PAIN: 0
COUGH: 0
SORE THROAT: 0
EYE REDNESS: 0
RHINORRHEA: 0
BACK PAIN: 0
DIARRHEA: 0
EYE DISCHARGE: 0
WHEEZING: 0

## 2019-07-10 ASSESSMENT — PAIN SCALES - GENERAL: PAINLEVEL_OUTOF10: 2

## 2019-07-26 ENCOUNTER — HOSPITAL ENCOUNTER (EMERGENCY)
Age: 54
Discharge: HOME | End: 2019-07-26
Payer: MEDICARE

## 2019-07-26 VITALS
TEMPERATURE: 98.5 F | OXYGEN SATURATION: 98 % | RESPIRATION RATE: 18 BRPM | SYSTOLIC BLOOD PRESSURE: 136 MMHG | DIASTOLIC BLOOD PRESSURE: 67 MMHG | HEART RATE: 86 BPM

## 2019-07-26 DIAGNOSIS — R21 RASH AND OTHER NONSPECIFIC SKIN ERUPTION: Primary | ICD-10-CM

## 2019-07-26 DIAGNOSIS — L40.9 PSORIASIS: ICD-10-CM

## 2019-07-26 PROCEDURE — 6360000002 HC RX W HCPCS: Performed by: PHYSICIAN ASSISTANT

## 2019-07-26 PROCEDURE — 99282 EMERGENCY DEPT VISIT SF MDM: CPT

## 2019-07-26 PROCEDURE — 96372 THER/PROPH/DIAG INJ SC/IM: CPT

## 2019-07-26 RX ORDER — METHYLPREDNISOLONE SODIUM SUCCINATE 125 MG/2ML
80 INJECTION, POWDER, LYOPHILIZED, FOR SOLUTION INTRAMUSCULAR; INTRAVENOUS ONCE
Status: COMPLETED | OUTPATIENT
Start: 2019-07-26 | End: 2019-07-26

## 2019-07-26 RX ORDER — TRIAMCINOLONE ACETONIDE 5 MG/G
CREAM TOPICAL
Qty: 15 G | Refills: 5 | Status: SHIPPED | OUTPATIENT
Start: 2019-07-26 | End: 2019-08-02

## 2019-07-26 RX ORDER — PREDNISONE 20 MG/1
20 TABLET ORAL 2 TIMES DAILY
Qty: 10 TABLET | Refills: 0 | Status: SHIPPED | OUTPATIENT
Start: 2019-07-26 | End: 2019-07-31

## 2019-07-26 RX ADMIN — METHYLPREDNISOLONE SODIUM SUCCINATE 80 MG: 125 INJECTION, POWDER, FOR SOLUTION INTRAMUSCULAR; INTRAVENOUS at 11:35

## 2019-07-26 ASSESSMENT — ENCOUNTER SYMPTOMS
NAUSEA: 0
EYE REDNESS: 0
ABDOMINAL PAIN: 0
RHINORRHEA: 0
WHEEZING: 0
SORE THROAT: 0
COLOR CHANGE: 0
TROUBLE SWALLOWING: 0
BACK PAIN: 0
VOMITING: 0
COUGH: 0
SHORTNESS OF BREATH: 0
EYE DISCHARGE: 0
DIARRHEA: 0
CONSTIPATION: 0

## 2019-07-26 NOTE — ED PROVIDER NOTES
needed for Sleep, Disp-7 tablet, R-0Print      atorvastatin (LIPITOR) 40 MG tablet Take 1 tablet by mouth nightly, Disp-7 tablet, R-0Print      cloNIDine (CATAPRES) 0.3 MG tablet Take 1 tablet by mouth 3 times daily for 7 days, Disp-21 tablet, R-0Print      sertraline (ZOLOFT) 100 MG tablet Take 1 tablet by mouth daily for 7 days, Disp-7 tablet, R-0Print      doxazosin (CARDURA) 8 MG tablet Take 1 tablet by mouth every 12 hours for 7 days, Disp-14 tablet, R-0Print      minoxidil (LONITEN) 10 MG tablet Take 1.5 tablets by mouth every 12 hours for 7 days, Disp-21 tablet, R-0Print      verapamil (CALAN SR) 240 MG extended release tablet Take 1 tablet by mouth daily for 7 days, Disp-7 tablet, R-0Print      sevelamer (RENVELA) 800 MG tablet Take 2 tablets by mouth 3 times daily (with meals) for 7 days, Disp-42 tablet, R-0Print      Multiple Vitamins-Minerals (THERAPEUTIC MULTIVITAMIN-MINERALS) tablet Take 1 tablet by mouth daily      fluticasone (FLONASE) 50 MCG/ACT nasal spray 1 spray by Nasal route 2 times daily Historical Med                  is allergic to humalog [insulin lispro]; insulin regular human; and lisinopril. FAMILY HISTORY     He indicated that his mother is . He indicated that his father is . He indicated that his brother is . family history includes Cancer in his mother; Other in his brother. SOCIAL HISTORY      reports that he has quit smoking. His smoking use included cigarettes. He started smoking about 33 years ago. He has a 5.00 pack-year smoking history. He has never used smokeless tobacco. He reports that he has current or past drug history. Drug: Cocaine. He reports that he does not drink alcohol. PHYSICAL EXAM     INITIAL VITALS:  oral temperature is 98.5 °F (36.9 °C). His blood pressure is 136/67 and his pulse is 86. His respiration is 18 and oxygen saturation is 98%. Physical Exam   Constitutional: He is oriented to person, place, and time.  He appears well-developed and well-nourished. No distress. HENT:   Head: Normocephalic and atraumatic. Right Ear: External ear normal.   Left Ear: External ear normal.   Nose: Nose normal.   Mouth/Throat: Oropharynx is clear and moist.   Eyes: Pupils are equal, round, and reactive to light. Conjunctivae and EOM are normal. Right eye exhibits no discharge. Left eye exhibits no discharge. No scleral icterus. Neck: Normal range of motion. Neck supple. Cardiovascular: Normal rate, regular rhythm and normal heart sounds. Exam reveals no gallop and no friction rub. No murmur heard. Pulmonary/Chest: Effort normal and breath sounds normal. No stridor. No respiratory distress. He has no wheezes. He has no rales. He exhibits no tenderness. Abdominal: Soft. He exhibits no distension. Musculoskeletal: Normal range of motion. He exhibits no edema. Lymphadenopathy:     He has no cervical adenopathy. Neurological: He is alert and oriented to person, place, and time. He exhibits normal muscle tone. Coordination normal. GCS eye subscore is 4. GCS verbal subscore is 5. GCS motor subscore is 6. Skin: Skin is warm and dry. Rash noted. He is not diaphoretic. No erythema. No pallor. There is a nontender plaque-like rash of the posterior left arm as well as across the lower back. This rash has silvery scaling consistent with perhaps psoriasis. There is no associated edema, tenderness, erythema, or drainage. Psychiatric: He has a normal mood and affect. His behavior is normal. Thought content normal.   Nursing note and vitals reviewed.       DIFFERENTIAL DIAGNOSIS:   Includes but not limited to: dermatitis, cellulitis, psoriasis, contact/allergic dermatitis    DIAGNOSTIC RESULTS     EKG: All EKG's are interpreted by the Emergency Department Physician who either signs or Co-signs this chart in the absence of a cardiologist.    None    RADIOLOGY: non-plainfilm images(s) such as CT, Ultrasound and MRI are read by the

## 2019-11-29 ENCOUNTER — HOSPITAL ENCOUNTER (INPATIENT)
Age: 54
LOS: 1 days | Discharge: HOME | DRG: 304 | End: 2019-12-01
Attending: EMERGENCY MEDICINE | Admitting: FAMILY MEDICINE
Payer: MEDICARE

## 2019-11-29 DIAGNOSIS — Z99.2 ESRD ON HEMODIALYSIS (HCC): ICD-10-CM

## 2019-11-29 DIAGNOSIS — I16.1 HYPERTENSIVE EMERGENCY: Primary | ICD-10-CM

## 2019-11-29 DIAGNOSIS — N18.6 ESRD ON HEMODIALYSIS (HCC): ICD-10-CM

## 2019-11-29 DIAGNOSIS — F14.10 NONDEPENDENT COCAINE ABUSE (HCC): ICD-10-CM

## 2019-11-29 PROCEDURE — 99285 EMERGENCY DEPT VISIT HI MDM: CPT

## 2019-11-30 ENCOUNTER — APPOINTMENT (OUTPATIENT)
Dept: GENERAL RADIOLOGY | Age: 54
DRG: 304 | End: 2019-11-30
Payer: MEDICARE

## 2019-11-30 PROBLEM — R07.9 CHEST PAIN: Status: RESOLVED | Noted: 2018-03-17 | Resolved: 2019-11-30

## 2019-11-30 PROBLEM — J18.9 PNEUMONIA: Status: RESOLVED | Noted: 2017-09-29 | Resolved: 2019-11-30

## 2019-11-30 PROBLEM — E87.79 OTHER FLUID OVERLOAD: Status: ACTIVE | Noted: 2018-08-05

## 2019-11-30 LAB
ALBUMIN SERPL-MCNC: 3.9 G/DL (ref 3.5–5.1)
ALP BLD-CCNC: 106 U/L (ref 38–126)
ALT SERPL-CCNC: 18 U/L (ref 11–66)
ANION GAP SERPL CALCULATED.3IONS-SCNC: 15 MEQ/L (ref 8–16)
ANION GAP SERPL CALCULATED.3IONS-SCNC: 17 MEQ/L (ref 8–16)
AST SERPL-CCNC: 22 U/L (ref 5–40)
BASOPHILS # BLD: 0.4 %
BASOPHILS ABSOLUTE: 0 THOU/MM3 (ref 0–0.1)
BILIRUB SERPL-MCNC: 0.2 MG/DL (ref 0.3–1.2)
BUN BLDV-MCNC: 32 MG/DL (ref 7–22)
BUN BLDV-MCNC: 71 MG/DL (ref 7–22)
CALCIUM SERPL-MCNC: 8.7 MG/DL (ref 8.5–10.5)
CALCIUM SERPL-MCNC: 9.1 MG/DL (ref 8.5–10.5)
CHLORIDE BLD-SCNC: 104 MEQ/L (ref 98–111)
CHLORIDE BLD-SCNC: 99 MEQ/L (ref 98–111)
CO2: 20 MEQ/L (ref 23–33)
CO2: 22 MEQ/L (ref 23–33)
CREAT SERPL-MCNC: 11.4 MG/DL (ref 0.4–1.2)
CREAT SERPL-MCNC: 6.8 MG/DL (ref 0.4–1.2)
EKG ATRIAL RATE: 99 BPM
EKG P AXIS: 47 DEGREES
EKG P-R INTERVAL: 182 MS
EKG Q-T INTERVAL: 386 MS
EKG QRS DURATION: 92 MS
EKG QTC CALCULATION (BAZETT): 495 MS
EKG R AXIS: -36 DEGREES
EKG T AXIS: 87 DEGREES
EKG VENTRICULAR RATE: 99 BPM
EOSINOPHIL # BLD: 1.5 %
EOSINOPHILS ABSOLUTE: 0.1 THOU/MM3 (ref 0–0.4)
ERYTHROCYTE [DISTWIDTH] IN BLOOD BY AUTOMATED COUNT: 13.8 % (ref 11.5–14.5)
ERYTHROCYTE [DISTWIDTH] IN BLOOD BY AUTOMATED COUNT: 53 FL (ref 35–45)
GFR SERPL CREATININE-BSD FRML MDRD: 10 ML/MIN/1.73M2
GFR SERPL CREATININE-BSD FRML MDRD: 6 ML/MIN/1.73M2
GLUCOSE BLD-MCNC: 124 MG/DL (ref 70–108)
GLUCOSE BLD-MCNC: 164 MG/DL (ref 70–108)
HCT VFR BLD CALC: 38.4 % (ref 42–52)
HEMOGLOBIN: 11.5 GM/DL (ref 14–18)
IMMATURE GRANS (ABS): 0.03 THOU/MM3 (ref 0–0.07)
IMMATURE GRANULOCYTES: 0.4 %
LYMPHOCYTES # BLD: 7.7 %
LYMPHOCYTES ABSOLUTE: 0.6 THOU/MM3 (ref 1–4.8)
MCH RBC QN AUTO: 31.7 PG (ref 26–33)
MCHC RBC AUTO-ENTMCNC: 29.9 GM/DL (ref 32.2–35.5)
MCV RBC AUTO: 105.8 FL (ref 80–94)
MONOCYTES # BLD: 7.8 %
MONOCYTES ABSOLUTE: 0.6 THOU/MM3 (ref 0.4–1.3)
MRSA SCREEN RT-PCR: NEGATIVE
NUCLEATED RED BLOOD CELLS: 0 /100 WBC
OSMOLALITY CALCULATION: 303.5 MOSMOL/KG (ref 275–300)
PLATELET # BLD: 160 THOU/MM3 (ref 130–400)
PMV BLD AUTO: 10.1 FL (ref 9.4–12.4)
POTASSIUM REFLEX MAGNESIUM: 4.6 MEQ/L (ref 3.5–5.2)
POTASSIUM SERPL-SCNC: 5.8 MEQ/L (ref 3.5–5.2)
RBC # BLD: 3.63 MILL/MM3 (ref 4.7–6.1)
SEG NEUTROPHILS: 82.2 %
SEGMENTED NEUTROPHILS ABSOLUTE COUNT: 6.4 THOU/MM3 (ref 1.8–7.7)
SODIUM BLD-SCNC: 136 MEQ/L (ref 135–145)
SODIUM BLD-SCNC: 141 MEQ/L (ref 135–145)
TOTAL PROTEIN: 7.4 G/DL (ref 6.1–8)
TROPONIN T: 0.04 NG/ML
TROPONIN T: 0.06 NG/ML
VANCOMYCIN RESISTANT ENTEROCOCCUS: NEGATIVE
WBC # BLD: 7.8 THOU/MM3 (ref 4.8–10.8)

## 2019-11-30 PROCEDURE — 90935 HEMODIALYSIS ONE EVALUATION: CPT | Performed by: INTERNAL MEDICINE

## 2019-11-30 PROCEDURE — 87500 VANOMYCIN DNA AMP PROBE: CPT

## 2019-11-30 PROCEDURE — 6370000000 HC RX 637 (ALT 250 FOR IP): Performed by: EMERGENCY MEDICINE

## 2019-11-30 PROCEDURE — 2500000003 HC RX 250 WO HCPCS: Performed by: EMERGENCY MEDICINE

## 2019-11-30 PROCEDURE — 6370000000 HC RX 637 (ALT 250 FOR IP): Performed by: INTERNAL MEDICINE

## 2019-11-30 PROCEDURE — 2060000000 HC ICU INTERMEDIATE R&B

## 2019-11-30 PROCEDURE — 87641 MR-STAPH DNA AMP PROBE: CPT

## 2019-11-30 PROCEDURE — 71045 X-RAY EXAM CHEST 1 VIEW: CPT

## 2019-11-30 PROCEDURE — 85025 COMPLETE CBC W/AUTO DIFF WBC: CPT

## 2019-11-30 PROCEDURE — 6360000002 HC RX W HCPCS: Performed by: FAMILY MEDICINE

## 2019-11-30 PROCEDURE — 93005 ELECTROCARDIOGRAM TRACING: CPT | Performed by: EMERGENCY MEDICINE

## 2019-11-30 PROCEDURE — 2580000003 HC RX 258: Performed by: FAMILY MEDICINE

## 2019-11-30 PROCEDURE — 94761 N-INVAS EAR/PLS OXIMETRY MLT: CPT

## 2019-11-30 PROCEDURE — 2709999900 HC NON-CHARGEABLE SUPPLY

## 2019-11-30 PROCEDURE — 87081 CULTURE SCREEN ONLY: CPT

## 2019-11-30 PROCEDURE — 99223 1ST HOSP IP/OBS HIGH 75: CPT | Performed by: FAMILY MEDICINE

## 2019-11-30 PROCEDURE — 5A1D70Z PERFORMANCE OF URINARY FILTRATION, INTERMITTENT, LESS THAN 6 HOURS PER DAY: ICD-10-PCS | Performed by: INTERNAL MEDICINE

## 2019-11-30 PROCEDURE — 80048 BASIC METABOLIC PNL TOTAL CA: CPT

## 2019-11-30 PROCEDURE — 6370000000 HC RX 637 (ALT 250 FOR IP): Performed by: FAMILY MEDICINE

## 2019-11-30 PROCEDURE — 84484 ASSAY OF TROPONIN QUANT: CPT

## 2019-11-30 PROCEDURE — 96365 THER/PROPH/DIAG IV INF INIT: CPT

## 2019-11-30 PROCEDURE — 90935 HEMODIALYSIS ONE EVALUATION: CPT

## 2019-11-30 PROCEDURE — 99222 1ST HOSP IP/OBS MODERATE 55: CPT | Performed by: INTERNAL MEDICINE

## 2019-11-30 PROCEDURE — 36415 COLL VENOUS BLD VENIPUNCTURE: CPT

## 2019-11-30 PROCEDURE — 96366 THER/PROPH/DIAG IV INF ADDON: CPT

## 2019-11-30 PROCEDURE — 80053 COMPREHEN METABOLIC PANEL: CPT

## 2019-11-30 RX ORDER — TRAZODONE HYDROCHLORIDE 50 MG/1
50 TABLET ORAL NIGHTLY PRN
Status: DISCONTINUED | OUTPATIENT
Start: 2019-11-30 | End: 2019-12-01 | Stop reason: HOSPADM

## 2019-11-30 RX ORDER — ACETAMINOPHEN 325 MG/1
650 TABLET ORAL EVERY 4 HOURS PRN
Status: DISCONTINUED | OUTPATIENT
Start: 2019-11-30 | End: 2019-12-01 | Stop reason: HOSPADM

## 2019-11-30 RX ORDER — THIAMINE MONONITRATE (VIT B1) 100 MG
100 TABLET ORAL DAILY
Status: DISCONTINUED | OUTPATIENT
Start: 2019-11-30 | End: 2019-12-01 | Stop reason: HOSPADM

## 2019-11-30 RX ORDER — ATORVASTATIN CALCIUM 40 MG/1
40 TABLET, FILM COATED ORAL NIGHTLY
Status: DISCONTINUED | OUTPATIENT
Start: 2019-11-30 | End: 2019-12-01 | Stop reason: HOSPADM

## 2019-11-30 RX ORDER — HEPARIN SODIUM 5000 [USP'U]/ML
5000 INJECTION, SOLUTION INTRAVENOUS; SUBCUTANEOUS EVERY 8 HOURS SCHEDULED
Status: DISCONTINUED | OUTPATIENT
Start: 2019-11-30 | End: 2019-12-01 | Stop reason: HOSPADM

## 2019-11-30 RX ORDER — ONDANSETRON 2 MG/ML
4 INJECTION INTRAMUSCULAR; INTRAVENOUS EVERY 6 HOURS PRN
Status: DISCONTINUED | OUTPATIENT
Start: 2019-11-30 | End: 2019-12-01 | Stop reason: HOSPADM

## 2019-11-30 RX ORDER — NITROGLYCERIN 20 MG/100ML
10 INJECTION INTRAVENOUS CONTINUOUS
Status: DISCONTINUED | OUTPATIENT
Start: 2019-11-30 | End: 2019-12-01 | Stop reason: HOSPADM

## 2019-11-30 RX ORDER — CALCIUM GLUCONATE 94 MG/ML
1 INJECTION, SOLUTION INTRAVENOUS ONCE
Status: COMPLETED | OUTPATIENT
Start: 2019-11-30 | End: 2019-11-30

## 2019-11-30 RX ORDER — SODIUM CHLORIDE 0.9 % (FLUSH) 0.9 %
10 SYRINGE (ML) INJECTION PRN
Status: DISCONTINUED | OUTPATIENT
Start: 2019-11-30 | End: 2019-12-01 | Stop reason: HOSPADM

## 2019-11-30 RX ORDER — SODIUM CHLORIDE 0.9 % (FLUSH) 0.9 %
10 SYRINGE (ML) INJECTION EVERY 12 HOURS SCHEDULED
Status: DISCONTINUED | OUTPATIENT
Start: 2019-11-30 | End: 2019-12-01 | Stop reason: HOSPADM

## 2019-11-30 RX ORDER — DOXAZOSIN MESYLATE 4 MG/1
8 TABLET ORAL EVERY 12 HOURS SCHEDULED
Status: DISCONTINUED | OUTPATIENT
Start: 2019-11-30 | End: 2019-12-01 | Stop reason: HOSPADM

## 2019-11-30 RX ORDER — SEVELAMER CARBONATE 800 MG/1
1600 TABLET, FILM COATED ORAL
Status: DISCONTINUED | OUTPATIENT
Start: 2019-11-30 | End: 2019-11-30

## 2019-11-30 RX ORDER — ASPIRIN 81 MG/1
81 TABLET ORAL DAILY
Status: DISCONTINUED | OUTPATIENT
Start: 2019-11-30 | End: 2019-12-01 | Stop reason: HOSPADM

## 2019-11-30 RX ORDER — HYDROXYZINE HYDROCHLORIDE 25 MG/1
50 TABLET, FILM COATED ORAL 3 TIMES DAILY PRN
Status: DISCONTINUED | OUTPATIENT
Start: 2019-11-30 | End: 2019-12-01 | Stop reason: HOSPADM

## 2019-11-30 RX ORDER — FOLIC ACID 1 MG/1
1 TABLET ORAL DAILY
Status: DISCONTINUED | OUTPATIENT
Start: 2019-11-30 | End: 2019-12-01 | Stop reason: HOSPADM

## 2019-11-30 RX ORDER — FAMOTIDINE 20 MG/1
10 TABLET, FILM COATED ORAL DAILY
Status: DISCONTINUED | OUTPATIENT
Start: 2019-11-30 | End: 2019-12-01 | Stop reason: HOSPADM

## 2019-11-30 RX ORDER — VITAMIN B COMPLEX
2000 TABLET ORAL 2 TIMES DAILY
Status: DISCONTINUED | OUTPATIENT
Start: 2019-11-30 | End: 2019-12-01 | Stop reason: HOSPADM

## 2019-11-30 RX ORDER — ACETAMINOPHEN 325 MG/1
650 TABLET ORAL ONCE
Status: COMPLETED | OUTPATIENT
Start: 2019-11-30 | End: 2019-11-30

## 2019-11-30 RX ADMIN — CLONIDINE HYDROCHLORIDE 0.3 MG: 0.2 TABLET ORAL at 18:06

## 2019-11-30 RX ADMIN — HEPARIN SODIUM 5000 UNITS: 5000 INJECTION INTRAVENOUS; SUBCUTANEOUS at 22:02

## 2019-11-30 RX ADMIN — ATORVASTATIN CALCIUM 40 MG: 40 TABLET, FILM COATED ORAL at 20:31

## 2019-11-30 RX ADMIN — VITAMIN D, TAB 1000IU (100/BT) 2000 UNITS: 25 TAB at 13:08

## 2019-11-30 RX ADMIN — FOLIC ACID 1 MG: 1 TABLET ORAL at 13:02

## 2019-11-30 RX ADMIN — HEPARIN SODIUM 5000 UNITS: 5000 INJECTION INTRAVENOUS; SUBCUTANEOUS at 13:12

## 2019-11-30 RX ADMIN — ACETAMINOPHEN 650 MG: 325 TABLET ORAL at 15:28

## 2019-11-30 RX ADMIN — Medication 10 ML: at 13:06

## 2019-11-30 RX ADMIN — DOXAZOSIN 8 MG: 4 TABLET ORAL at 15:00

## 2019-11-30 RX ADMIN — FAMOTIDINE 10 MG: 20 TABLET, FILM COATED ORAL at 13:02

## 2019-11-30 RX ADMIN — CALCIUM GLUCONATE 1 G: 98 INJECTION, SOLUTION INTRAVENOUS at 13:02

## 2019-11-30 RX ADMIN — VITAMIN D, TAB 1000IU (100/BT) 2000 UNITS: 25 TAB at 20:31

## 2019-11-30 RX ADMIN — CLONIDINE HYDROCHLORIDE 0.3 MG: 0.2 TABLET ORAL at 09:50

## 2019-11-30 RX ADMIN — ASPIRIN 81 MG: 81 TABLET ORAL at 13:02

## 2019-11-30 RX ADMIN — ACETAMINOPHEN 650 MG: 325 TABLET ORAL at 03:38

## 2019-11-30 RX ADMIN — HYDROXYZINE HYDROCHLORIDE 50 MG: 25 TABLET ORAL at 13:02

## 2019-11-30 RX ADMIN — Medication 100 MG: at 13:02

## 2019-11-30 RX ADMIN — NITROGLYCERIN 40 MCG/MIN: 20 INJECTION INTRAVENOUS at 16:33

## 2019-11-30 RX ADMIN — NITROGLYCERIN 10 MCG/MIN: 20 INJECTION INTRAVENOUS at 00:27

## 2019-11-30 ASSESSMENT — ENCOUNTER SYMPTOMS
EYE DISCHARGE: 0
SHORTNESS OF BREATH: 1
ABDOMINAL DISTENTION: 0
CHEST TIGHTNESS: 1
VOMITING: 0
CONSTIPATION: 0
EYES NEGATIVE: 1
COUGH: 0
PHOTOPHOBIA: 0
SORE THROAT: 0
DIARRHEA: 0
EYE REDNESS: 0
RHINORRHEA: 0
STRIDOR: 0
WHEEZING: 0
EYE ITCHING: 0
ABDOMINAL PAIN: 0
BACK PAIN: 0
EYE PAIN: 0
NAUSEA: 0

## 2019-11-30 ASSESSMENT — PAIN DESCRIPTION - DESCRIPTORS
DESCRIPTORS: HEADACHE
DESCRIPTORS: HEADACHE
DESCRIPTORS: ACHING

## 2019-11-30 ASSESSMENT — PAIN SCALES - GENERAL
PAINLEVEL_OUTOF10: 0
PAINLEVEL_OUTOF10: 2
PAINLEVEL_OUTOF10: 0
PAINLEVEL_OUTOF10: 6
PAINLEVEL_OUTOF10: 6
PAINLEVEL_OUTOF10: 0
PAINLEVEL_OUTOF10: 7

## 2019-11-30 ASSESSMENT — PAIN DESCRIPTION - LOCATION
LOCATION: HEAD

## 2019-11-30 ASSESSMENT — PAIN DESCRIPTION - PAIN TYPE
TYPE: ACUTE PAIN

## 2019-11-30 ASSESSMENT — PAIN DESCRIPTION - ONSET: ONSET: GRADUAL

## 2019-11-30 ASSESSMENT — PAIN DESCRIPTION - FREQUENCY
FREQUENCY: INTERMITTENT
FREQUENCY: INTERMITTENT
FREQUENCY: CONTINUOUS

## 2019-11-30 ASSESSMENT — PAIN DESCRIPTION - ORIENTATION: ORIENTATION: MID

## 2019-11-30 ASSESSMENT — PAIN DESCRIPTION - PROGRESSION: CLINICAL_PROGRESSION: GRADUALLY WORSENING

## 2019-11-30 ASSESSMENT — PAIN - FUNCTIONAL ASSESSMENT: PAIN_FUNCTIONAL_ASSESSMENT: ACTIVITIES ARE NOT PREVENTED

## 2019-12-01 VITALS
TEMPERATURE: 98 F | WEIGHT: 232.7 LBS | OXYGEN SATURATION: 95 % | RESPIRATION RATE: 16 BRPM | BODY MASS INDEX: 28.93 KG/M2 | HEART RATE: 94 BPM | SYSTOLIC BLOOD PRESSURE: 149 MMHG | DIASTOLIC BLOOD PRESSURE: 92 MMHG | HEIGHT: 75 IN

## 2019-12-01 LAB
ALBUMIN SERPL-MCNC: 3.6 G/DL (ref 3.5–5.1)
ALP BLD-CCNC: 90 U/L (ref 38–126)
ALT SERPL-CCNC: 13 U/L (ref 11–66)
ANION GAP SERPL CALCULATED.3IONS-SCNC: 16 MEQ/L (ref 8–16)
AST SERPL-CCNC: 12 U/L (ref 5–40)
BASOPHILS # BLD: 0.5 %
BASOPHILS ABSOLUTE: 0 THOU/MM3 (ref 0–0.1)
BILIRUB SERPL-MCNC: 0.3 MG/DL (ref 0.3–1.2)
BUN BLDV-MCNC: 41 MG/DL (ref 7–22)
CALCIUM SERPL-MCNC: 8.6 MG/DL (ref 8.5–10.5)
CHLORIDE BLD-SCNC: 98 MEQ/L (ref 98–111)
CO2: 21 MEQ/L (ref 23–33)
CREAT SERPL-MCNC: 8.6 MG/DL (ref 0.4–1.2)
EOSINOPHIL # BLD: 3.7 %
EOSINOPHILS ABSOLUTE: 0.2 THOU/MM3 (ref 0–0.4)
ERYTHROCYTE [DISTWIDTH] IN BLOOD BY AUTOMATED COUNT: 14 % (ref 11.5–14.5)
ERYTHROCYTE [DISTWIDTH] IN BLOOD BY AUTOMATED COUNT: 53 FL (ref 35–45)
GFR SERPL CREATININE-BSD FRML MDRD: 8 ML/MIN/1.73M2
GLUCOSE BLD-MCNC: 88 MG/DL (ref 70–108)
HCT VFR BLD CALC: 38.5 % (ref 42–52)
HEMOGLOBIN: 11.5 GM/DL (ref 14–18)
IMMATURE GRANS (ABS): 0.03 THOU/MM3 (ref 0–0.07)
IMMATURE GRANULOCYTES: 0.7 %
LYMPHOCYTES # BLD: 16.2 %
LYMPHOCYTES ABSOLUTE: 0.7 THOU/MM3 (ref 1–4.8)
MAGNESIUM: 2.5 MG/DL (ref 1.6–2.4)
MCH RBC QN AUTO: 31.1 PG (ref 26–33)
MCHC RBC AUTO-ENTMCNC: 29.9 GM/DL (ref 32.2–35.5)
MCV RBC AUTO: 104.1 FL (ref 80–94)
MONOCYTES # BLD: 10.6 %
MONOCYTES ABSOLUTE: 0.5 THOU/MM3 (ref 0.4–1.3)
NUCLEATED RED BLOOD CELLS: 0 /100 WBC
PHOSPHORUS: 5.7 MG/DL (ref 2.4–4.7)
PLATELET # BLD: 146 THOU/MM3 (ref 130–400)
PMV BLD AUTO: 10.2 FL (ref 9.4–12.4)
POTASSIUM REFLEX MAGNESIUM: 5.2 MEQ/L (ref 3.5–5.2)
POTASSIUM SERPL-SCNC: 5.2 MEQ/L (ref 3.5–5.2)
RBC # BLD: 3.7 MILL/MM3 (ref 4.7–6.1)
SEG NEUTROPHILS: 68.3 %
SEGMENTED NEUTROPHILS ABSOLUTE COUNT: 2.9 THOU/MM3 (ref 1.8–7.7)
SODIUM BLD-SCNC: 135 MEQ/L (ref 135–145)
TOTAL PROTEIN: 7.4 G/DL (ref 6.1–8)
WBC # BLD: 4.3 THOU/MM3 (ref 4.8–10.8)

## 2019-12-01 PROCEDURE — 6370000000 HC RX 637 (ALT 250 FOR IP): Performed by: INTERNAL MEDICINE

## 2019-12-01 PROCEDURE — 80053 COMPREHEN METABOLIC PANEL: CPT

## 2019-12-01 PROCEDURE — 2709999900 HC NON-CHARGEABLE SUPPLY

## 2019-12-01 PROCEDURE — 80048 BASIC METABOLIC PNL TOTAL CA: CPT

## 2019-12-01 PROCEDURE — 85025 COMPLETE CBC W/AUTO DIFF WBC: CPT

## 2019-12-01 PROCEDURE — 99239 HOSP IP/OBS DSCHRG MGMT >30: CPT | Performed by: INTERNAL MEDICINE

## 2019-12-01 PROCEDURE — 6370000000 HC RX 637 (ALT 250 FOR IP): Performed by: FAMILY MEDICINE

## 2019-12-01 PROCEDURE — 84100 ASSAY OF PHOSPHORUS: CPT

## 2019-12-01 PROCEDURE — 83735 ASSAY OF MAGNESIUM: CPT

## 2019-12-01 PROCEDURE — 6360000002 HC RX W HCPCS: Performed by: FAMILY MEDICINE

## 2019-12-01 PROCEDURE — 36415 COLL VENOUS BLD VENIPUNCTURE: CPT

## 2019-12-01 PROCEDURE — 99232 SBSQ HOSP IP/OBS MODERATE 35: CPT | Performed by: INTERNAL MEDICINE

## 2019-12-01 RX ORDER — M-VIT,TX,IRON,MINS/CALC/FOLIC 27MG-0.4MG
1 TABLET ORAL DAILY
Status: DISCONTINUED | OUTPATIENT
Start: 2019-12-01 | End: 2019-12-01 | Stop reason: HOSPADM

## 2019-12-01 RX ORDER — FLUTICASONE PROPIONATE 50 MCG
1 SPRAY, SUSPENSION (ML) NASAL 2 TIMES DAILY
Status: DISCONTINUED | OUTPATIENT
Start: 2019-12-01 | End: 2019-12-01 | Stop reason: HOSPADM

## 2019-12-01 RX ORDER — DOXAZOSIN MESYLATE 4 MG/1
8 TABLET ORAL EVERY 12 HOURS SCHEDULED
Status: DISCONTINUED | OUTPATIENT
Start: 2019-12-01 | End: 2019-12-01 | Stop reason: SDUPTHER

## 2019-12-01 RX ORDER — FOLIC ACID 1 MG/1
1 TABLET ORAL DAILY
Qty: 30 TABLET | Refills: 3 | Status: ON HOLD | OUTPATIENT
Start: 2019-12-02 | End: 2020-02-25

## 2019-12-01 RX ORDER — ISOSORBIDE MONONITRATE 60 MG/1
120 TABLET, EXTENDED RELEASE ORAL DAILY
Status: DISCONTINUED | OUTPATIENT
Start: 2019-12-01 | End: 2019-12-01 | Stop reason: HOSPADM

## 2019-12-01 RX ORDER — VERAPAMIL HYDROCHLORIDE 240 MG/1
240 TABLET, FILM COATED, EXTENDED RELEASE ORAL NIGHTLY
Status: DISCONTINUED | OUTPATIENT
Start: 2019-12-01 | End: 2019-12-01 | Stop reason: HOSPADM

## 2019-12-01 RX ORDER — LANOLIN ALCOHOL/MO/W.PET/CERES
100 CREAM (GRAM) TOPICAL DAILY
Qty: 30 TABLET | Refills: 3 | Status: ON HOLD | OUTPATIENT
Start: 2019-12-02 | End: 2020-01-28 | Stop reason: SDUPTHER

## 2019-12-01 RX ORDER — MINOXIDIL 2.5 MG/1
15 TABLET ORAL EVERY 12 HOURS
Status: DISCONTINUED | OUTPATIENT
Start: 2019-12-01 | End: 2019-12-01 | Stop reason: HOSPADM

## 2019-12-01 RX ADMIN — VITAMIN D, TAB 1000IU (100/BT) 2000 UNITS: 25 TAB at 07:56

## 2019-12-01 RX ADMIN — ASPIRIN 81 MG: 81 TABLET ORAL at 08:02

## 2019-12-01 RX ADMIN — HEPARIN SODIUM 5000 UNITS: 5000 INJECTION INTRAVENOUS; SUBCUTANEOUS at 05:29

## 2019-12-01 RX ADMIN — DOXAZOSIN 8 MG: 4 TABLET ORAL at 07:58

## 2019-12-01 RX ADMIN — Medication 100 MG: at 07:58

## 2019-12-01 RX ADMIN — FOLIC ACID 1 MG: 1 TABLET ORAL at 07:56

## 2019-12-01 RX ADMIN — FLUTICASONE PROPIONATE 1 SPRAY: 50 SPRAY, METERED NASAL at 10:38

## 2019-12-01 RX ADMIN — MULTIPLE VITAMINS W/ MINERALS TAB 1 TABLET: TAB at 10:36

## 2019-12-01 RX ADMIN — CLONIDINE HYDROCHLORIDE 0.3 MG: 0.2 TABLET ORAL at 07:57

## 2019-12-01 RX ADMIN — VERAPAMIL HYDROCHLORIDE 240 MG: 240 TABLET, FILM COATED, EXTENDED RELEASE ORAL at 10:35

## 2019-12-01 RX ADMIN — DOXAZOSIN 8 MG: 4 TABLET ORAL at 00:53

## 2019-12-01 RX ADMIN — ISOSORBIDE MONONITRATE 120 MG: 60 TABLET ORAL at 10:37

## 2019-12-01 RX ADMIN — MINOXIDIL 15 MG: 2.5 TABLET ORAL at 10:36

## 2019-12-01 RX ADMIN — CLONIDINE HYDROCHLORIDE 0.3 MG: 0.2 TABLET ORAL at 00:53

## 2019-12-01 RX ADMIN — FAMOTIDINE 10 MG: 20 TABLET, FILM COATED ORAL at 08:02

## 2019-12-01 ASSESSMENT — PAIN SCALES - GENERAL
PAINLEVEL_OUTOF10: 0

## 2019-12-02 ENCOUNTER — CARE COORDINATION (OUTPATIENT)
Dept: CASE MANAGEMENT | Age: 54
End: 2019-12-02

## 2019-12-02 LAB — MRSA SCREEN: NORMAL

## 2019-12-03 ENCOUNTER — CARE COORDINATION (OUTPATIENT)
Dept: CASE MANAGEMENT | Age: 54
End: 2019-12-03

## 2019-12-04 ENCOUNTER — CARE COORDINATION (OUTPATIENT)
Dept: CASE MANAGEMENT | Age: 54
End: 2019-12-04

## 2020-01-21 ENCOUNTER — APPOINTMENT (OUTPATIENT)
Dept: GENERAL RADIOLOGY | Age: 55
End: 2020-01-21
Payer: MEDICARE

## 2020-01-21 ENCOUNTER — HOSPITAL ENCOUNTER (EMERGENCY)
Age: 55
Discharge: HOME | End: 2020-01-21
Attending: EMERGENCY MEDICINE
Payer: MEDICARE

## 2020-01-21 VITALS
BODY MASS INDEX: 28.48 KG/M2 | RESPIRATION RATE: 18 BRPM | OXYGEN SATURATION: 94 % | HEART RATE: 107 BPM | TEMPERATURE: 98.1 F | HEIGHT: 75 IN | DIASTOLIC BLOOD PRESSURE: 93 MMHG | WEIGHT: 229.06 LBS | SYSTOLIC BLOOD PRESSURE: 187 MMHG

## 2020-01-21 LAB
ANION GAP SERPL CALCULATED.3IONS-SCNC: 18 MEQ/L (ref 8–16)
BASOPHILS # BLD: 0.3 %
BASOPHILS ABSOLUTE: 0 THOU/MM3 (ref 0–0.1)
BUN BLDV-MCNC: 59 MG/DL (ref 7–22)
CALCIUM SERPL-MCNC: 8.7 MG/DL (ref 8.5–10.5)
CHLORIDE BLD-SCNC: 99 MEQ/L (ref 98–111)
CO2: 23 MEQ/L (ref 23–33)
CREAT SERPL-MCNC: 10.2 MG/DL (ref 0.4–1.2)
EKG ATRIAL RATE: 101 BPM
EKG P AXIS: 57 DEGREES
EKG P-R INTERVAL: 194 MS
EKG Q-T INTERVAL: 386 MS
EKG QRS DURATION: 96 MS
EKG QTC CALCULATION (BAZETT): 500 MS
EKG R AXIS: -30 DEGREES
EKG T AXIS: 87 DEGREES
EKG VENTRICULAR RATE: 101 BPM
EOSINOPHIL # BLD: 2 %
EOSINOPHILS ABSOLUTE: 0.1 THOU/MM3 (ref 0–0.4)
ERYTHROCYTE [DISTWIDTH] IN BLOOD BY AUTOMATED COUNT: 13.2 % (ref 11.5–14.5)
ERYTHROCYTE [DISTWIDTH] IN BLOOD BY AUTOMATED COUNT: 49.3 FL (ref 35–45)
GFR SERPL CREATININE-BSD FRML MDRD: 6 ML/MIN/1.73M2
GLUCOSE BLD-MCNC: 83 MG/DL (ref 70–108)
HCT VFR BLD CALC: 36.3 % (ref 42–52)
HEMOGLOBIN: 11.1 GM/DL (ref 14–18)
IMMATURE GRANS (ABS): 0.01 THOU/MM3 (ref 0–0.07)
IMMATURE GRANULOCYTES: 0.2 %
LYMPHOCYTES # BLD: 11 %
LYMPHOCYTES ABSOLUTE: 0.7 THOU/MM3 (ref 1–4.8)
MCH RBC QN AUTO: 31 PG (ref 26–33)
MCHC RBC AUTO-ENTMCNC: 30.6 GM/DL (ref 32.2–35.5)
MCV RBC AUTO: 101.4 FL (ref 80–94)
MONOCYTES # BLD: 8.3 %
MONOCYTES ABSOLUTE: 0.5 THOU/MM3 (ref 0.4–1.3)
NUCLEATED RED BLOOD CELLS: 0 /100 WBC
OSMOLALITY CALCULATION: 295.1 MOSMOL/KG (ref 275–300)
PLATELET # BLD: 140 THOU/MM3 (ref 130–400)
PMV BLD AUTO: 9.9 FL (ref 9.4–12.4)
POTASSIUM SERPL-SCNC: 5.3 MEQ/L (ref 3.5–5.2)
RBC # BLD: 3.58 MILL/MM3 (ref 4.7–6.1)
SEG NEUTROPHILS: 78.2 %
SEGMENTED NEUTROPHILS ABSOLUTE COUNT: 4.7 THOU/MM3 (ref 1.8–7.7)
SODIUM BLD-SCNC: 140 MEQ/L (ref 135–145)
WBC # BLD: 6 THOU/MM3 (ref 4.8–10.8)

## 2020-01-21 PROCEDURE — 96374 THER/PROPH/DIAG INJ IV PUSH: CPT

## 2020-01-21 PROCEDURE — 36415 COLL VENOUS BLD VENIPUNCTURE: CPT

## 2020-01-21 PROCEDURE — 99285 EMERGENCY DEPT VISIT HI MDM: CPT

## 2020-01-21 PROCEDURE — 80048 BASIC METABOLIC PNL TOTAL CA: CPT

## 2020-01-21 PROCEDURE — 85025 COMPLETE CBC W/AUTO DIFF WBC: CPT

## 2020-01-21 PROCEDURE — 71046 X-RAY EXAM CHEST 2 VIEWS: CPT

## 2020-01-21 PROCEDURE — 6360000002 HC RX W HCPCS: Performed by: EMERGENCY MEDICINE

## 2020-01-21 PROCEDURE — 93005 ELECTROCARDIOGRAM TRACING: CPT | Performed by: EMERGENCY MEDICINE

## 2020-01-21 RX ORDER — HYDRALAZINE HYDROCHLORIDE 20 MG/ML
20 INJECTION INTRAMUSCULAR; INTRAVENOUS ONCE
Status: COMPLETED | OUTPATIENT
Start: 2020-01-21 | End: 2020-01-21

## 2020-01-21 RX ORDER — HYDRALAZINE HYDROCHLORIDE 20 MG/ML
INJECTION INTRAMUSCULAR; INTRAVENOUS
Status: DISPENSED
Start: 2020-01-21 | End: 2020-01-21

## 2020-01-21 RX ADMIN — HYDRALAZINE HYDROCHLORIDE 20 MG: 20 INJECTION INTRAMUSCULAR; INTRAVENOUS at 03:24

## 2020-01-21 ASSESSMENT — ENCOUNTER SYMPTOMS
WHEEZING: 0
EYE REDNESS: 0
CONSTIPATION: 0
PHOTOPHOBIA: 0
EYE DISCHARGE: 0
NAUSEA: 0
DIARRHEA: 0
RHINORRHEA: 0
BACK PAIN: 0
SORE THROAT: 0
EYE ITCHING: 0
VOMITING: 0
SHORTNESS OF BREATH: 1
CHEST TIGHTNESS: 0
EYE PAIN: 0
ABDOMINAL DISTENTION: 0
COUGH: 0
STRIDOR: 0
ABDOMINAL PAIN: 0

## 2020-01-21 NOTE — ED NOTES
Pt comes to the ED today due to SOB that started a couple of hours ago, pt says that yesterday he did not go to dialysis, LS are diminished throughout, pt was SOB on arrival to ED room but after oxygen was applied and pt sat and rested his respirations became unlabored, denies any pain or nvd     Celia Tobar, [place]  01/21/20 [address], RN  01/21/20 2493

## 2020-01-21 NOTE — ED PROVIDER NOTES
1 MG TABLET    Take 1 tablet by mouth daily    HYDROXYZINE (ATARAX) 50 MG TABLET    Take 50 mg by mouth 3 times daily as needed for Itching    ISOSORBIDE MONONITRATE (IMDUR) 120 MG EXTENDED RELEASE TABLET    Take 1 tablet by mouth daily    MINOXIDIL (LONITEN) 10 MG TABLET    Take 1.5 tablets by mouth every 12 hours for 7 days    MULTIPLE VITAMINS-MINERALS (THERAPEUTIC MULTIVITAMIN-MINERALS) TABLET    Take 1 tablet by mouth daily    TRAZODONE (DESYREL) 50 MG TABLET    Take 1 tablet by mouth nightly as needed for Sleep    VERAPAMIL (CALAN SR) 240 MG EXTENDED RELEASE TABLET    Take 1 tablet by mouth daily for 7 days    VITAMIN B-1 100 MG TABLET    Take 1 tablet by mouth daily    VITAMIN D (CHOLECALCIFEROL) 1000 UNIT TABS TABLET    Take 2 tablets by mouth 2 times daily       ALLERGIES     is allergic to humalog [insulin lispro]; insulin regular human; and lisinopril. FAMILY HISTORY     He indicated that his mother is . He indicated that his father is . He indicated that his brother is . family history includes Cancer in his mother; Other in his brother. SOCIAL HISTORY      reports that he has been smoking cigarettes. He started smoking about 34 years ago. He has a 5.00 pack-year smoking history. He has never used smokeless tobacco. He reports current drug use. Drug: Cocaine. He reports that he does not drink alcohol. PHYSICAL EXAM     INITIAL VITALS:  height is 6' 3\" (1.905 m) and weight is 229 lb 0.9 oz (103.9 kg). His oral temperature is 98.1 °F (36.7 °C). His blood pressure is 187/93 (abnormal) and his pulse is 107. His respiration is 18 and oxygen saturation is 94%. Physical Exam  Vitals signs and nursing note reviewed. Constitutional:       Appearance: He is well-developed. He is not diaphoretic. HENT:      Head: Normocephalic and atraumatic. Nose: Nose normal.   Eyes:      General: No scleral icterus. Right eye: No discharge. Left eye: No discharge. Conjunctiva/sclera: Conjunctivae normal.      Pupils: Pupils are equal, round, and reactive to light. Neck:      Musculoskeletal: Normal range of motion and neck supple. Vascular: No JVD. Trachea: No tracheal deviation. Cardiovascular:      Rate and Rhythm: Normal rate and regular rhythm. Heart sounds: Normal heart sounds. No murmur. No friction rub. No gallop. Pulmonary:      Effort: Pulmonary effort is normal. No respiratory distress. Breath sounds: No stridor. Rales present. No wheezing or rhonchi. Comments: Crackles from both lungs  Chest:      Chest wall: No tenderness. Abdominal:      General: Bowel sounds are normal. There is no distension. Palpations: Abdomen is soft. There is no mass. Tenderness: There is no tenderness. There is no guarding or rebound. Hernia: No hernia is present. Musculoskeletal:         General: No tenderness or deformity. Lymphadenopathy:      Cervical: No cervical adenopathy. Skin:     General: Skin is warm and dry. Capillary Refill: Capillary refill takes less than 2 seconds. Coloration: Skin is not pale. Findings: No erythema or rash. Neurological:      Mental Status: He is alert and oriented to person, place, and time. Cranial Nerves: No cranial nerve deficit. Sensory: No sensory deficit. Motor: No abnormal muscle tone. Coordination: Coordination normal.      Deep Tendon Reflexes: Reflexes normal.   Psychiatric:         Behavior: Behavior normal.         Thought Content:  Thought content normal.         Judgment: Judgment normal.       DIFFERENTIAL DIAGNOSIS:   Fluid overload, hyperkalemia, CHF exacerbation, anxiety    DIAGNOSTIC RESULTS     EKG: All EKG's are interpreted by the Emergency Department Physician who either signs or Co-signsthis chart in the absence of a cardiologist.  Interpreted by me  Sinus tachycardia  Ventricular rate 101 bpm  TN interval 194 ms  QRS duration 96 ms   ms  Left axis deviation  Left ventricular hypertrophy  No ST elevation or acute T wave    RADIOLOGY: non-plain film images(s) such as CT, Ultrasound and MRI are read by the radiologist.    XR CHEST STANDARD (2 VW)   Final Result      Pulmonary vascular congestion and mild interstitial pulmonary edema consistent with mild CHF with patchy confluence at the right base. Small right and minimal left pleural effusions. Additional stable findings as discussed above. **This report has been created using voice recognition software. It may contain minor errors which are inherent in voice recognition technology. **      Final report electronically signed by Dr. Evan Sanabria on 1/21/2020 3:40 AM          []Visualized and interpreted by me   [] Radiologist's Wet Read Report Reviewed   [] Discussed with Radiologist.    Sd Zeng:   Results for orders placed or performed during the hospital encounter of 01/21/20   CBC auto differential   Result Value Ref Range    WBC 6.0 4.8 - 10.8 thou/mm3    RBC 3.58 (L) 4.70 - 6.10 mill/mm3    Hemoglobin 11.1 (L) 14.0 - 18.0 gm/dl    Hematocrit 36.3 (L) 42.0 - 52.0 %    .4 (H) 80.0 - 94.0 fL    MCH 31.0 26.0 - 33.0 pg    MCHC 30.6 (L) 32.2 - 35.5 gm/dl    RDW-CV 13.2 11.5 - 14.5 %    RDW-SD 49.3 (H) 35.0 - 45.0 fL    Platelets 890 815 - 214 thou/mm3    MPV 9.9 9.4 - 12.4 fL    Seg Neutrophils 78.2 %    Lymphocytes 11.0 %    Monocytes 8.3 %    Eosinophils 2.0 %    Basophils 0.3 %    Immature Granulocytes 0.2 %    Segs Absolute 4.7 1.8 - 7.7 thou/mm3    Lymphocytes Absolute 0.7 (L) 1.0 - 4.8 thou/mm3    Monocytes Absolute 0.5 0.4 - 1.3 thou/mm3    Eosinophils Absolute 0.1 0.0 - 0.4 thou/mm3    Basophils Absolute 0.0 0.0 - 0.1 thou/mm3    Immature Grans (Abs) 0.01 0.00 - 0.07 thou/mm3    nRBC 0 /100 wbc   Basic Metabolic Panel   Result Value Ref Range    Sodium 140 135 - 145 meq/L    Potassium 5.3 (H) 3.5 - 5.2 meq/L    Chloride 99 98 - 111 meq/L    CO2 23 23 - 33 meq/L    Glucose 83 70 - 108 mg/dL    BUN 59 (H) 7 - 22 mg/dL    CREATININE 10.2 (HH) 0.4 - 1.2 mg/dL    Calcium 8.7 8.5 - 10.5 mg/dL   Anion Gap   Result Value Ref Range    Anion Gap 18.0 (H) 8.0 - 16.0 meq/L   Glomerular Filtration Rate, Estimated   Result Value Ref Range    Est, Glom Filt Rate 6 (A) ml/min/1.73m2   Osmolality   Result Value Ref Range    Osmolality Calc 295.1 275.0 - 300 mOsmol/kg       EMERGENCY DEPARTMENT COURSE:   Vitals:    Vitals:    01/21/20 0258 01/21/20 0355   BP: (!) 202/103 (!) 187/93   Pulse: 101 107   Resp: 28 18   Temp: 98.1 °F (36.7 °C)    TempSrc: Oral    SpO2: 92% 94%   Weight: 229 lb 0.9 oz (103.9 kg)    Height: 6' 3\" (1.905 m)        3: 00 AM    Patient is seen and evaluated in a timely fashion. Action:     Labs include CBC, CMP. Chest x-ray. EKG. MedicalDecision Making    Reassessment:     Patient feels better with following ED medications. Medications   hydrALAZINE (APRESOLINE) injection 20 mg (20 mg Intravenous Given 1/21/20 0324)       Blood pressure improved to 183/93. Patient has no chest pain. Shortness of breath is better. Potassium 5.3, chest x-ray shows pulmonary congestion. Patient is due for hemodialysis today in 4 hours. Discharged in stable condition. CRITICAL CARE:   None    CONSULTS:  None    PROCEDURES:  None    FINAL IMPRESSION      1. Hypertensive urgency    2. ESRD on hemodialysis ([facility] [address]          DISPOSITION/PLAN   Home    PATIENT REFERRED TO:  Hemodialysis      as scheduled today.       DISCHARGE MEDICATIONS:  New Prescriptions    No medications on file       (Please note that portions of this note were completed with a voice recognition program.  Efforts were made to edit the dictations but occasionally words aremis-transcribed.)    MD Trina Brothers MD  01/21/20 9633

## 2020-01-26 ENCOUNTER — HOSPITAL ENCOUNTER (INPATIENT)
Age: 55
LOS: 2 days | Discharge: HOME | DRG: 291 | End: 2020-01-28
Attending: INTERNAL MEDICINE | Admitting: INTERNAL MEDICINE
Payer: MEDICARE

## 2020-01-26 ENCOUNTER — APPOINTMENT (OUTPATIENT)
Dept: GENERAL RADIOLOGY | Age: 55
DRG: 291 | End: 2020-01-26
Payer: MEDICARE

## 2020-01-26 PROBLEM — E87.70 VOLUME OVERLOAD: Status: ACTIVE | Noted: 2020-01-26

## 2020-01-26 LAB
ANION GAP SERPL CALCULATED.3IONS-SCNC: 14 MEQ/L (ref 8–16)
ANION GAP SERPL CALCULATED.3IONS-SCNC: 16 MEQ/L (ref 8–16)
BASOPHILS # BLD: 0.4 %
BASOPHILS ABSOLUTE: 0 THOU/MM3 (ref 0–0.1)
BUN BLDV-MCNC: 36 MG/DL (ref 7–22)
BUN BLDV-MCNC: 69 MG/DL (ref 7–22)
CALCIUM SERPL-MCNC: 8.7 MG/DL (ref 8.5–10.5)
CALCIUM SERPL-MCNC: 8.9 MG/DL (ref 8.5–10.5)
CHLORIDE BLD-SCNC: 100 MEQ/L (ref 98–111)
CHLORIDE BLD-SCNC: 98 MEQ/L (ref 98–111)
CO2: 23 MEQ/L (ref 23–33)
CO2: 28 MEQ/L (ref 23–33)
CREAT SERPL-MCNC: 10.8 MG/DL (ref 0.4–1.2)
CREAT SERPL-MCNC: 6.7 MG/DL (ref 0.4–1.2)
EOSINOPHIL # BLD: 3 %
EOSINOPHILS ABSOLUTE: 0.2 THOU/MM3 (ref 0–0.4)
ERYTHROCYTE [DISTWIDTH] IN BLOOD BY AUTOMATED COUNT: 13.3 % (ref 11.5–14.5)
ERYTHROCYTE [DISTWIDTH] IN BLOOD BY AUTOMATED COUNT: 49.6 FL (ref 35–45)
GFR SERPL CREATININE-BSD FRML MDRD: 11 ML/MIN/1.73M2
GFR SERPL CREATININE-BSD FRML MDRD: 6 ML/MIN/1.73M2
GLUCOSE BLD-MCNC: 103 MG/DL (ref 70–108)
GLUCOSE BLD-MCNC: 105 MG/DL (ref 70–108)
GLUCOSE BLD-MCNC: 131 MG/DL (ref 70–108)
GLUCOSE BLD-MCNC: 96 MG/DL (ref 70–108)
HCT VFR BLD CALC: 31.9 % (ref 42–52)
HEMOGLOBIN: 9.8 GM/DL (ref 14–18)
IMMATURE GRANS (ABS): 0.02 THOU/MM3 (ref 0–0.07)
IMMATURE GRANULOCYTES: 0.4 %
LYMPHOCYTES # BLD: 10.3 %
LYMPHOCYTES ABSOLUTE: 0.5 THOU/MM3 (ref 1–4.8)
MAGNESIUM: 2.9 MG/DL (ref 1.6–2.4)
MCH RBC QN AUTO: 31.3 PG (ref 26–33)
MCHC RBC AUTO-ENTMCNC: 30.7 GM/DL (ref 32.2–35.5)
MCV RBC AUTO: 101.9 FL (ref 80–94)
MONOCYTES # BLD: 7.5 %
MONOCYTES ABSOLUTE: 0.4 THOU/MM3 (ref 0.4–1.3)
NUCLEATED RED BLOOD CELLS: 0 /100 WBC
OSMOLALITY CALCULATION: 298 MOSMOL/KG (ref 275–300)
PLATELET # BLD: 132 THOU/MM3 (ref 130–400)
PMV BLD AUTO: 10 FL (ref 9.4–12.4)
POTASSIUM SERPL-SCNC: 4.8 MEQ/L (ref 3.5–5.2)
POTASSIUM SERPL-SCNC: 7.1 MEQ/L (ref 3.5–5.2)
PRO-BNP: ABNORMAL PG/ML (ref 0–900)
RBC # BLD: 3.13 MILL/MM3 (ref 4.7–6.1)
SEG NEUTROPHILS: 78.4 %
SEGMENTED NEUTROPHILS ABSOLUTE COUNT: 4.2 THOU/MM3 (ref 1.8–7.7)
SODIUM BLD-SCNC: 139 MEQ/L (ref 135–145)
SODIUM BLD-SCNC: 140 MEQ/L (ref 135–145)
TROPONIN T: 0.06 NG/ML
WBC # BLD: 5.3 THOU/MM3 (ref 4.8–10.8)

## 2020-01-26 PROCEDURE — 83880 ASSAY OF NATRIURETIC PEPTIDE: CPT

## 2020-01-26 PROCEDURE — 93005 ELECTROCARDIOGRAM TRACING: CPT | Performed by: PHYSICIAN ASSISTANT

## 2020-01-26 PROCEDURE — 6370000000 HC RX 637 (ALT 250 FOR IP): Performed by: NURSE PRACTITIONER

## 2020-01-26 PROCEDURE — 83735 ASSAY OF MAGNESIUM: CPT

## 2020-01-26 PROCEDURE — 84484 ASSAY OF TROPONIN QUANT: CPT

## 2020-01-26 PROCEDURE — 2500000003 HC RX 250 WO HCPCS: Performed by: PHYSICIAN ASSISTANT

## 2020-01-26 PROCEDURE — 94760 N-INVAS EAR/PLS OXIMETRY 1: CPT

## 2020-01-26 PROCEDURE — 2709999900 HC NON-CHARGEABLE SUPPLY

## 2020-01-26 PROCEDURE — 94640 AIRWAY INHALATION TREATMENT: CPT

## 2020-01-26 PROCEDURE — 85025 COMPLETE CBC W/AUTO DIFF WBC: CPT

## 2020-01-26 PROCEDURE — 6360000002 HC RX W HCPCS: Performed by: PHYSICIAN ASSISTANT

## 2020-01-26 PROCEDURE — 99223 1ST HOSP IP/OBS HIGH 75: CPT | Performed by: NURSE PRACTITIONER

## 2020-01-26 PROCEDURE — 80048 BASIC METABOLIC PNL TOTAL CA: CPT

## 2020-01-26 PROCEDURE — 2580000003 HC RX 258: Performed by: NURSE PRACTITIONER

## 2020-01-26 PROCEDURE — 82948 REAGENT STRIP/BLOOD GLUCOSE: CPT

## 2020-01-26 PROCEDURE — 2580000003 HC RX 258: Performed by: PHYSICIAN ASSISTANT

## 2020-01-26 PROCEDURE — 2060000000 HC ICU INTERMEDIATE R&B

## 2020-01-26 PROCEDURE — 90935 HEMODIALYSIS ONE EVALUATION: CPT

## 2020-01-26 PROCEDURE — 71045 X-RAY EXAM CHEST 1 VIEW: CPT

## 2020-01-26 PROCEDURE — 36415 COLL VENOUS BLD VENIPUNCTURE: CPT

## 2020-01-26 PROCEDURE — 99285 EMERGENCY DEPT VISIT HI MDM: CPT

## 2020-01-26 PROCEDURE — 99221 1ST HOSP IP/OBS SF/LOW 40: CPT | Performed by: INTERNAL MEDICINE

## 2020-01-26 PROCEDURE — 90935 HEMODIALYSIS ONE EVALUATION: CPT | Performed by: INTERNAL MEDICINE

## 2020-01-26 PROCEDURE — 5A1D70Z PERFORMANCE OF URINARY FILTRATION, INTERMITTENT, LESS THAN 6 HOURS PER DAY: ICD-10-PCS | Performed by: INTERNAL MEDICINE

## 2020-01-26 RX ORDER — TRAZODONE HYDROCHLORIDE 50 MG/1
50 TABLET ORAL NIGHTLY PRN
Status: DISCONTINUED | OUTPATIENT
Start: 2020-01-26 | End: 2020-01-28 | Stop reason: HOSPADM

## 2020-01-26 RX ORDER — ATORVASTATIN CALCIUM 40 MG/1
40 TABLET, FILM COATED ORAL NIGHTLY
Status: DISCONTINUED | OUTPATIENT
Start: 2020-01-26 | End: 2020-01-28 | Stop reason: HOSPADM

## 2020-01-26 RX ORDER — VITAMIN B COMPLEX
2000 TABLET ORAL 2 TIMES DAILY
Status: DISCONTINUED | OUTPATIENT
Start: 2020-01-26 | End: 2020-01-28 | Stop reason: HOSPADM

## 2020-01-26 RX ORDER — FOLIC ACID 1 MG/1
1 TABLET ORAL DAILY
Status: DISCONTINUED | OUTPATIENT
Start: 2020-01-27 | End: 2020-01-28 | Stop reason: HOSPADM

## 2020-01-26 RX ORDER — FLUTICASONE PROPIONATE 50 MCG
1 SPRAY, SUSPENSION (ML) NASAL 2 TIMES DAILY
Status: DISCONTINUED | OUTPATIENT
Start: 2020-01-26 | End: 2020-01-28 | Stop reason: HOSPADM

## 2020-01-26 RX ORDER — ISOSORBIDE MONONITRATE 60 MG/1
120 TABLET, EXTENDED RELEASE ORAL DAILY
Status: DISCONTINUED | OUTPATIENT
Start: 2020-01-27 | End: 2020-01-28 | Stop reason: HOSPADM

## 2020-01-26 RX ORDER — HEPARIN SODIUM 5000 [USP'U]/ML
5000 INJECTION, SOLUTION INTRAVENOUS; SUBCUTANEOUS EVERY 8 HOURS SCHEDULED
Status: DISCONTINUED | OUTPATIENT
Start: 2020-01-26 | End: 2020-01-28 | Stop reason: HOSPADM

## 2020-01-26 RX ORDER — FAMOTIDINE 20 MG/1
20 TABLET, FILM COATED ORAL DAILY
Status: DISCONTINUED | OUTPATIENT
Start: 2020-01-27 | End: 2020-01-28 | Stop reason: HOSPADM

## 2020-01-26 RX ORDER — SODIUM CHLORIDE 0.9 % (FLUSH) 0.9 %
10 SYRINGE (ML) INJECTION PRN
Status: DISCONTINUED | OUTPATIENT
Start: 2020-01-26 | End: 2020-01-28 | Stop reason: HOSPADM

## 2020-01-26 RX ORDER — M-VIT,TX,IRON,MINS/CALC/FOLIC 27MG-0.4MG
1 TABLET ORAL DAILY
Status: DISCONTINUED | OUTPATIENT
Start: 2020-01-27 | End: 2020-01-28 | Stop reason: HOSPADM

## 2020-01-26 RX ORDER — SERTRALINE HYDROCHLORIDE 100 MG/1
100 TABLET, FILM COATED ORAL DAILY
COMMUNITY

## 2020-01-26 RX ORDER — SODIUM CHLORIDE 0.9 % (FLUSH) 0.9 %
10 SYRINGE (ML) INJECTION EVERY 12 HOURS SCHEDULED
Status: DISCONTINUED | OUTPATIENT
Start: 2020-01-26 | End: 2020-01-28 | Stop reason: HOSPADM

## 2020-01-26 RX ORDER — BUMETANIDE 0.25 MG/ML
2 INJECTION, SOLUTION INTRAMUSCULAR; INTRAVENOUS ONCE
Status: DISCONTINUED | OUTPATIENT
Start: 2020-01-26 | End: 2020-01-28

## 2020-01-26 RX ORDER — DOXAZOSIN MESYLATE 4 MG/1
8 TABLET ORAL EVERY 12 HOURS SCHEDULED
Status: DISCONTINUED | OUTPATIENT
Start: 2020-01-26 | End: 2020-01-28 | Stop reason: HOSPADM

## 2020-01-26 RX ORDER — ONDANSETRON 2 MG/ML
4 INJECTION INTRAMUSCULAR; INTRAVENOUS EVERY 6 HOURS PRN
Status: DISCONTINUED | OUTPATIENT
Start: 2020-01-26 | End: 2020-01-28 | Stop reason: HOSPADM

## 2020-01-26 RX ORDER — ASPIRIN 81 MG/1
81 TABLET ORAL DAILY
Status: DISCONTINUED | OUTPATIENT
Start: 2020-01-27 | End: 2020-01-28 | Stop reason: HOSPADM

## 2020-01-26 RX ORDER — SODIUM POLYSTYRENE SULFONATE 15 G/60ML
30 SUSPENSION ORAL; RECTAL ONCE
Status: DISCONTINUED | OUTPATIENT
Start: 2020-01-26 | End: 2020-01-26

## 2020-01-26 RX ORDER — DEXTROSE MONOHYDRATE 25 G/50ML
25 INJECTION, SOLUTION INTRAVENOUS ONCE
Status: DISCONTINUED | OUTPATIENT
Start: 2020-01-26 | End: 2020-01-28

## 2020-01-26 RX ORDER — VERAPAMIL HYDROCHLORIDE 240 MG/1
240 TABLET, FILM COATED, EXTENDED RELEASE ORAL DAILY
Status: DISCONTINUED | OUTPATIENT
Start: 2020-01-27 | End: 2020-01-28 | Stop reason: HOSPADM

## 2020-01-26 RX ADMIN — SODIUM BICARBONATE 50 MEQ: 84 INJECTION, SOLUTION INTRAVENOUS at 10:06

## 2020-01-26 RX ADMIN — ALBUTEROL SULFATE 2.5 MG: 2.5 SOLUTION RESPIRATORY (INHALATION) at 10:37

## 2020-01-26 RX ADMIN — DOXAZOSIN 8 MG: 4 TABLET ORAL at 19:46

## 2020-01-26 RX ADMIN — FLUTICASONE PROPIONATE 1 SPRAY: 50 SPRAY, METERED NASAL at 19:45

## 2020-01-26 RX ADMIN — VITAMIN D, TAB 1000IU (100/BT) 2000 UNITS: 25 TAB at 19:45

## 2020-01-26 RX ADMIN — ATORVASTATIN CALCIUM 40 MG: 40 TABLET, FILM COATED ORAL at 19:46

## 2020-01-26 RX ADMIN — CLONIDINE HYDROCHLORIDE 0.3 MG: 0.2 TABLET ORAL at 19:46

## 2020-01-26 RX ADMIN — Medication 10 ML: at 19:46

## 2020-01-26 RX ADMIN — CLONIDINE HYDROCHLORIDE 0.3 MG: 0.2 TABLET ORAL at 16:44

## 2020-01-26 RX ADMIN — CALCIUM GLUCONATE 1 G: 98 INJECTION, SOLUTION INTRAVENOUS at 10:06

## 2020-01-26 ASSESSMENT — ENCOUNTER SYMPTOMS
SINUS PAIN: 0
DIARRHEA: 0
CONSTIPATION: 0
ABDOMINAL PAIN: 0
SORE THROAT: 0
VOMITING: 0
NAUSEA: 0
ABDOMINAL DISTENTION: 0
BLOOD IN STOOL: 0
COUGH: 1
SINUS PRESSURE: 0
SHORTNESS OF BREATH: 1
EYE PAIN: 0

## 2020-01-26 ASSESSMENT — PAIN SCALES - GENERAL
PAINLEVEL_OUTOF10: 0

## 2020-01-26 NOTE — ED PROVIDER NOTES
Hypertension, Left renal artery stenosis (HCC), Monoclonal (M) protein disease, multiple 'M' protein, Nicotine dependence, Noncompliance, Pneumonia, Psychiatric problem, PTSD (post-traumatic stress disorder), Secondary hyperparathyroidism (of renal origin), and Sleep apnea. SURGICAL HISTORY      has a past surgical history that includes Leg Tendon Surgery; EKG 12 Lead (9/24/2015); Dialysis fistula creation (Left, 07/08/2016); vascular surgery (Left, 07/08/2016); vascular surgery (Right, 1990); and Hand tendon surgery (Left, 4/5/2019).     CURRENT MEDICATIONS       Current Discharge Medication List      CONTINUE these medications which have NOT CHANGED    Details   sertraline (ZOLOFT) 100 MG tablet Take 100 mg by mouth daily      Sucroferric Oxyhydroxide (VELPHORO) 500 MG CHEW Take 500 mg by mouth 3 times daily (with meals) 1 tablet two times daily with snacks      folic acid (FOLVITE) 1 MG tablet Take 1 tablet by mouth daily  Qty: 30 tablet, Refills: 3      vitamin B-1 100 MG tablet Take 1 tablet by mouth daily  Qty: 30 tablet, Refills: 3      hydrOXYzine (ATARAX) 50 MG tablet Take 50 mg by mouth 3 times daily as needed for Itching      famotidine (PEPCID) 20 MG tablet Take 1 tablet by mouth daily  Qty: 60 tablet, Refills: 3      vitamin D (CHOLECALCIFEROL) 1000 UNIT TABS tablet Take 2 tablets by mouth 2 times daily  Qty: 60 tablet, Refills: 0      aspirin 81 MG EC tablet Take 1 tablet by mouth daily  Qty: 7 tablet, Refills: 0      isosorbide mononitrate (IMDUR) 120 MG extended release tablet Take 1 tablet by mouth daily  Qty: 7 tablet, Refills: 0      traZODone (DESYREL) 50 MG tablet Take 1 tablet by mouth nightly as needed for Sleep  Qty: 7 tablet, Refills: 0      atorvastatin (LIPITOR) 40 MG tablet Take 1 tablet by mouth nightly  Qty: 7 tablet, Refills: 0      cloNIDine (CATAPRES) 0.3 MG tablet Take 1 tablet by mouth 3 times daily for 7 days  Qty: 21 tablet, Refills: 0      doxazosin (CARDURA) 8 MG tablet Take 1 tablet by mouth every 12 hours for 7 days  Qty: 14 tablet, Refills: 0      minoxidil (LONITEN) 10 MG tablet Take 1.5 tablets by mouth every 12 hours for 7 days  Qty: 21 tablet, Refills: 0      verapamil (CALAN SR) 240 MG extended release tablet Take 1 tablet by mouth daily for 7 days  Qty: 7 tablet, Refills: 0      Multiple Vitamins-Minerals (THERAPEUTIC MULTIVITAMIN-MINERALS) tablet Take 1 tablet by mouth daily      fluticasone (FLONASE) 50 MCG/ACT nasal spray 1 spray by Nasal route 2 times daily              ALLERGIES     is allergic to humalog [insulin lispro]; insulin regular human; and lisinopril. HISTORY     He indicated that his mother is . He indicated that his father is . He indicated that his brother is . family history includes Cancer in his mother; Other in his brother. SOCIALHISTORY      reports that he has been smoking cigarettes. He started smoking about 34 years ago. He has a 5.00 pack-year smoking history. He has never used smokeless tobacco. He reports current drug use. Drug: Cocaine. He reports that he does not drink alcohol. PHYSICAL EXAM     INITIAL VITALS:  height is 6' 3\" (1.905 m) and weight is 231 lb 6.4 oz (105 kg). His oral temperature is 98.5 °F (36.9 °C). His blood pressure is 132/73 and his pulse is 98. His respiration is 14 and oxygen saturation is 95%. Physical Exam  Vitals signs and nursing note reviewed. Constitutional:       Appearance: He is well-developed. Comments: Acutely ill   HENT:      Head: Normocephalic and atraumatic. Right Ear: External ear normal.      Left Ear: External ear normal.   Eyes:      Pupils: Pupils are equal, round, and reactive to light. Neck:      Musculoskeletal: Normal range of motion. Cardiovascular:      Rate and Rhythm: Regular rhythm. Tachycardia present. Pulmonary:      Comments: Tachypnea and diminished in the bases. Abdominal:      General: Bowel sounds are normal. There is no distension. Palpations: Abdomen is soft. Tenderness: There is no abdominal tenderness. Skin:     General: Skin is warm. Neurological:      Mental Status: He is alert and oriented to person, place, and time. Cranial Nerves: No cranial nerve deficit. Coordination: Coordination normal.      Deep Tendon Reflexes: Reflexes normal.   Psychiatric:         Behavior: Behavior normal.         DIFFERENTIAL DIAGNOSIS:   Shortness of breath possible CHF. Possible pneumonia hemodialysis yesterday's this means pulmonary edema is likely.     DIAGNOSTIC RESULTS     EKG: All EKG's are interpreted by the Emergency Department Physician who either signs or Co-signs this chart in the absence of a cardiologist.      RADIOLOGY: non-plain film images(s) such as CT, Ultrasound and MRI are read by the radiologist.  Single view chest x-ray read per radiology      LABS:   Labs Reviewed   CBC WITH AUTO DIFFERENTIAL - Abnormal; Notable for the following components:       Result Value    RBC 3.13 (*)     Hemoglobin 9.8 (*)     Hematocrit 31.9 (*)     .9 (*)     MCHC 30.7 (*)     RDW-SD 49.6 (*)     Lymphocytes Absolute 0.5 (*)     All other components within normal limits   BRAIN NATRIURETIC PEPTIDE - Abnormal; Notable for the following components:    Pro-BNP 52381.0 (*)     All other components within normal limits   BASIC METABOLIC PANEL - Abnormal; Notable for the following components:    Potassium 7.1 (*)     BUN 69 (*)     CREATININE 10.8 (*)     All other components within normal limits   TROPONIN - Abnormal; Notable for the following components:    Troponin T 0.062 (*)     All other components within normal limits   MAGNESIUM - Abnormal; Notable for the following components:    Magnesium 2.9 (*)     All other components within normal limits   GLOMERULAR FILTRATION RATE, ESTIMATED - Abnormal; Notable for the following components:    Est, Glom Filt Rate 6 (*)     All other components within normal limits   BASIC METABOLIC

## 2020-01-26 NOTE — ED NOTES
Pt states he is feeling more weak and tired. Pt states that he just wants to not feel SOB anymore. Glucose checked for patient, 131 at bedside. Transport here to take patient to dialysis.       Concha Polanco RN  01/26/20 8056

## 2020-01-26 NOTE — ED TRIAGE NOTES
Pt c/o SOB due to missing his dialysis yesterday. Pt states he does dialysis Tuesday, Thursday and Saturday. Pt states he knew he had to come in because he felt like he was filling up with fluid and was unable to breath. Pt placed on 2L NC for comfort, sating around 93% on RA. Pt currently sating 97% on 2L. EKG done at bedside. Pt denies and N/V, states he had a bit of CP but it was due to him not being able to breath.

## 2020-01-26 NOTE — FLOWSHEET NOTE
Discussed with Dialysis RN Lazaro Molina about pt coming directly from ED to dialysis. Informed ED RN Garry Lack of this information. No questions or concerns.   Reji Tolentino

## 2020-01-26 NOTE — H&P
(congestive heart failure), NYHA class 2 ([facility] [address]     NURIS (acute kidney injury) ([facility] 9/24/2015    Anemia associated with chronic renal failure     Arthritis     stated in hands    Cocaine abuse ([facility] [address] 5/10/2014    Depression     Diabetes mellitus ([facility] [address]     pt states he no longer has diabetes he has lost alot of davion.  Diastolic heart failure secondary to coronary artery disease (HCC)     FSGS (focal segmental glomerulosclerosis) 5/23/2013    Hemodialysis patient ([facility] 10/17/2016    on hemodialysis with [facility]    Hemorrhoids 1/16/2012    History of blood transfusion     Hyperlipidemia     Hyperphosphatemia 5/21/2016    Hypertension     Left renal artery stenosis ([facility] 5/22/2014    Monoclonal (M) protein disease, multiple 'M' protein     Nicotine dependence 6/16/2014    Noncompliance     Pneumonia     Psychiatric problem     PTSD (post-traumatic stress disorder)     Pt vet from [place]    Secondary hyperparathyroidism (of renal origin)     Sleep apnea        Past Surgical History:          Procedure Laterality Date    DIALYSIS FISTULA CREATION Left 07/08/2016    EKG 12-LEAD  9/24/2015         HAND TENDON SURGERY Left 4/5/2019    LEFT THUMB FLEXOR TENDON REPAIR performed by Danika Briones MD at [facility] VASCULAR SURGERY Left 07/08/2016    AV Fistula    VASCULAR SURGERY Right 1990    Surgery on Achilles tendon       Medications Prior to Admission:      Prior to Admission medications    Medication Sig Start Date End Date Taking?  Authorizing Provider   folic acid (FOLVITE) 1 MG tablet Take 1 tablet by mouth daily 12/2/19   Javier Guadalupe MD   vitamin B-1 100 MG tablet Take 1 tablet by mouth daily 12/2/19   Javier Guadalupe MD   hydrOXYzine (ATARAX) 50 MG tablet Take 50 mg by mouth 3 times daily as needed for Itching    Historical Provider, MD   famotidine (PEPCID) 20 MG tablet Take 1 tablet by mouth daily 3/23/18   Jules Macy Flores MD   vitamin D (CHOLECALCIFEROL) 1000 UNIT TABS tablet Take 2 tablets by mouth 2 times daily 3/23/18   Dave Ma MD   aspirin 81 MG EC tablet Take 1 tablet by mouth daily 3/23/18   Dave Ma MD   isosorbide mononitrate (IMDUR) 120 MG extended release tablet Take 1 tablet by mouth daily 3/24/18   Dave Ma MD   traZODone (DESYREL) 50 MG tablet Take 1 tablet by mouth nightly as needed for Sleep 3/23/18   Dave Ma MD   atorvastatin (LIPITOR) 40 MG tablet Take 1 tablet by mouth nightly 3/23/18   Dave Ma MD   cloNIDine (CATAPRES) 0.3 MG tablet Take 1 tablet by mouth 3 times daily for 7 days 3/23/18 4/5/19  Dave Ma MD   doxazosin (CARDURA) 8 MG tablet Take 1 tablet by mouth every 12 hours for 7 days 3/23/18 4/5/19  Dave Ma MD   minoxidil (LONITEN) 10 MG tablet Take 1.5 tablets by mouth every 12 hours for 7 days 3/23/18 4/5/19  Dave Ma MD   verapamil (CALAN SR) 240 MG extended release tablet Take 1 tablet by mouth daily for 7 days 3/24/18 8/5/18  Dave Ma MD   Multiple Vitamins-Minerals (THERAPEUTIC MULTIVITAMIN-MINERALS) tablet Take 1 tablet by mouth daily    Historical Provider, MD   fluticasone (FLONASE) 50 MCG/ACT nasal spray 1 spray by Nasal route 2 times daily     Historical Provider, MD       Allergies:  Humalog [insulin lispro]; Insulin regular human; and Lisinopril    Social History:      The patient currently lives at home. TOBACCO:   reports that he has been smoking cigarettes. He started smoking about 34 years ago. He has a 5.00 pack-year smoking history. He has never used smokeless tobacco.  ETOH:   reports no history of alcohol use. Family History:      Positive as follows:        Problem Relation Age of Onset    Cancer Mother     Other Brother         aneurysm        Diet:  Renal    REVIEW OF SYSTEMS:   Pertinent positives as noted in the HPI. All other systems reviewed and negative.     PHYSICAL EXAM:    BP (!) 196/93 recorded. Radiology:       XR CHEST PORTABLE   Final Result      1. Findings which are likely related to congestive heart failure changes which have slightly worsened since the prior exam.   2. Small right-sided pleural effusion. **This report has been created using voice recognition software. It may contain minor errors which are inherent in voice recognition technology. **      Final report electronically signed by Dr Dyanna Kehr on 1/26/2020 9:26 AM           DVT prophylaxis: Heparin. Code Status: Full code      Disposition:Home      Thank you No primary care provider on file. for the opportunity to be involved in this patient's care.     Electronically signed by WADE Montanez CNP on 1/26/2020 at 10:18 AM

## 2020-01-26 NOTE — CONSULTS
is 800. Patient already reports improvement in his symptoms. Denies any chest pain at this time. No fever or any chills. No nausea vomiting or any diarrhea. Denies any dizziness at this time. Past Medical History:  Past Medical History:   Diagnosis Date    AAA (abdominal aortic aneurysm) (HCC)     Acute on chronic diastolic CHF (congestive heart failure), NYHA class 2 ([facility]     [facility]URIS [facility]acute kidney [facility]) [facility] 9/24/2015    Anemia associated with chronic renal failure     Arthritis     stated in hands    Cocaine abuse ([facility] 5/10/2014    Depression     Diabetes mellitus ([facility]     pt states he no longer has diabetes he has lost alot of davion.      Diastolic heart failure secondary to coronary artery disease (HCC)     FSGS (focal segmental glomerulosclerosis) 5/23/2013    Hemodialysis patient ([facility] 10/17/2016    on hemodialysis with [facility] 1/16/2012    History of blood transfusion     Hyperlipidemia     Hyperphosphatemia 5/21/2016    Hypertension     Left renal artery stenosis ([facility] 5/22/2014    Monoclonal (M) protein disease, multiple 'M' protein     Nicotine dependence 6/16/2014    Noncompliance     Pneumonia     Psychiatric problem     PTSD (post-traumatic stress disorder)     Pt vet from [place]    Secondary hyperparathyroidism (of renal origin)     Sleep apnea        Past Surgical History:  Past Surgical History:   Procedure Laterality Date    DIALYSIS FISTULA CREATION Left 07/08/2016    EKG 12-LEAD  9/24/2015         HAND TENDON SURGERY Left 4/5/2019    LEFT THUMB FLEXOR TENDON REPAIR performed by Lisha Patrick MD at [address] [place] [address] VASCULAR SURGERY Left 07/08/2016    AV Fistula    VASCULAR SURGERY Right 1990    Surgery on Achilles tendon       Family History:  Family History   Problem Relation Age of Onset    Cancer Mother     Other Brother         aneurysm        Social History:  Social History Socioeconomic History    Marital status:      Spouse name: Andrea    Number of children: 2    Years of education: 12    Highest education level: Not on file   Occupational History     Employer: [employer]   Social Needs    Financial resource strain: Not on file    Food insecurity:     Worry: Not on file     Inability: Not on file    Transportation needs:     Medical: Not on file     Non-medical: Not on file   Tobacco Use    Smoking status: Current Some Day Smoker     Packs/day: 0.25     Years: 20.00     Pack years: 5.00     Types: Cigarettes     Start date: 10/11/1985    Smokeless tobacco: Never Used    Tobacco comment: quit last month   Substance and Sexual Activity    Alcohol use: No    Drug use: Yes     Types: Cocaine     Comment: hx of    Sexual activity: Yes     Partners: Female   Lifestyle    Physical activity:     Days per week: Not on file     Minutes per session: Not on file    Stress: Not on file   Relationships    Social connections:     Talks on phone: Not on file     Gets together: Not on file     Attends Sikh service: Not on file     Active member of club or organization: Not on file     Attends meetings of clubs or organizations: Not on file     Relationship status: Not on file    Intimate partner violence:     Fear of current or ex partner: Not on file     Emotionally abused: Not on file     Physically abused: Not on file     Forced sexual activity: Not on file   Other Topics Concern    Not on file   Social History Narrative    Not on file       Home Meds:  Prior to Admission medications    Medication Sig Start Date End Date Taking?  Authorizing Provider   folic acid (FOLVITE) 1 MG tablet Take 1 tablet by mouth daily 12/2/19   Zenia Dobbs MD   vitamin B-1 100 MG tablet Take 1 tablet by mouth daily 12/2/19   Zenia Dobbs MD   hydrOXYzine (ATARAX) 50 MG tablet Take 50 mg by mouth 3 times daily as needed for Itching    Historical Provider, MD   famotidine (PEPCID) hematuria  Skin: Negative for rash  Musculoskeletal: Negative for joint pain, moves all ext  Neuro: Negative for numbness or tingling, negative for slurred speech  Psychiatric: Reports stable mood, negative for depression or insomnia    All other review of systems were reviewed and negative    Current Meds:  Infusion:    niCARdipine Stopped (01/26/20 0930)     Meds:    dextrose  25 g Intravenous Once    insulin regular  10 Units Intravenous Once    bumetanide  2 mg Intravenous Once     Meds prn:      Allergies/Intolerances: ALLERGIES: Humalog [insulin lispro]; Insulin regular human; and Lisinopril    24HR INTAKE/OUTPUT:  No intake or output data in the 24 hours ending 01/26/20 1459  No intake/output data recorded. No intake/output data recorded. Admission weight: 230 lb (104.3 kg)  Wt Readings from Last 3 Encounters:   01/26/20 230 lb (104.3 kg)   01/21/20 229 lb 0.9 oz (103.9 kg)   12/01/19 232 lb 11.2 oz (105.6 kg)     Body mass index is 28.75 kg/m². Physical Examination:  VITALS:  /68   Pulse 71   Temp 97.7 °F (36.5 °C)   Resp 15   Ht 6' 3\" (1.905 m)   Wt 230 lb (104.3 kg)   SpO2 95%   BMI 28.75 kg/m²   Weight:   Wt Readings from Last 3 Encounters:   01/26/20 230 lb (104.3 kg)   01/21/20 229 lb 0.9 oz (103.9 kg)   12/01/19 232 lb 11.2 oz (105.6 kg)     Constitutional and General Appearance: alert and cooperative with exam, appears comfortable, no distress, not diaphoretic, but somewhat short of breath when trying to speak. Seen and examined on dialysis. Currently tolerating dialysis treatment well and reports improvement in his symptoms since admission. Eyes: no icteric sclera in left eye or right eye,  no pallor conjunctiva, no discharge seen from left eye or right eye  Ears and Nose: normal external appearance of left and right ear. Both ear lobules are nontender to palpation. Normal appearance of nose. No active drainage from nose.    Oral: moist oral mucus membranes  Neck: No jugular

## 2020-01-26 NOTE — FLOWSHEET NOTE
01/26/20 1135 01/26/20 1536   Vital Signs   /68 (!) 165/84   Temp 97.7 °F (36.5 °C) 97.7 °F (36.5 °C)   Pulse 71 98   Resp 15 15   Post-Hemodialysis Assessment   Post-Treatment Procedures  --  Blood returned; Access bleeding time < 10 minutes   Machine Disinfection Process  --  Acid/Vinegar Clean;Heat Disinfect; Exterior Machine Disinfection   Rinseback Volume (ml)  --  400 ml   Total Liters Processed (l/min)  --  80.1 l/min   Dialyzer Clearance  --  Lightly streaked   Duration of Treatment (minutes)  --  210 minutes   Heparin amount administered during treatment (units)  --  0 units   Hemodialysis Output (ml)  --  4900 ml   NET Removed (ml)  --  4500 ml   Stable 3.5 hour treatment. 4.5L removed during dialysis. Sites held x10 min each. Dressings dry and intact. Report given to primary RN. Treatment record printed for scanning.

## 2020-01-27 LAB
ANION GAP SERPL CALCULATED.3IONS-SCNC: 12 MEQ/L (ref 8–16)
BASOPHILS # BLD: 0.5 %
BASOPHILS ABSOLUTE: 0 THOU/MM3 (ref 0–0.1)
BUN BLDV-MCNC: 46 MG/DL (ref 7–22)
CALCIUM SERPL-MCNC: 8.5 MG/DL (ref 8.5–10.5)
CHLORIDE BLD-SCNC: 95 MEQ/L (ref 98–111)
CO2: 28 MEQ/L (ref 23–33)
CREAT SERPL-MCNC: 8.4 MG/DL (ref 0.4–1.2)
EKG ATRIAL RATE: 105 BPM
EKG ATRIAL RATE: 68 BPM
EKG ATRIAL RATE: 92 BPM
EKG P AXIS: 37 DEGREES
EKG P AXIS: 61 DEGREES
EKG P-R INTERVAL: 192 MS
EKG P-R INTERVAL: 196 MS
EKG Q-T INTERVAL: 368 MS
EKG Q-T INTERVAL: 374 MS
EKG Q-T INTERVAL: 440 MS
EKG QRS DURATION: 108 MS
EKG QRS DURATION: 122 MS
EKG QRS DURATION: 86 MS
EKG QTC CALCULATION (BAZETT): 462 MS
EKG QTC CALCULATION (BAZETT): 467 MS
EKG QTC CALCULATION (BAZETT): 486 MS
EKG R AXIS: -25 DEGREES
EKG R AXIS: -42 DEGREES
EKG R AXIS: -46 DEGREES
EKG T AXIS: 103 DEGREES
EKG T AXIS: 120 DEGREES
EKG T AXIS: 93 DEGREES
EKG VENTRICULAR RATE: 105 BPM
EKG VENTRICULAR RATE: 68 BPM
EKG VENTRICULAR RATE: 92 BPM
EOSINOPHIL # BLD: 3.7 %
EOSINOPHILS ABSOLUTE: 0.2 THOU/MM3 (ref 0–0.4)
ERYTHROCYTE [DISTWIDTH] IN BLOOD BY AUTOMATED COUNT: 13.5 % (ref 11.5–14.5)
ERYTHROCYTE [DISTWIDTH] IN BLOOD BY AUTOMATED COUNT: 50.5 FL (ref 35–45)
GFR SERPL CREATININE-BSD FRML MDRD: 8 ML/MIN/1.73M2
GLUCOSE BLD-MCNC: 89 MG/DL (ref 70–108)
HCT VFR BLD CALC: 32.4 % (ref 42–52)
HEMOGLOBIN: 9.8 GM/DL (ref 14–18)
IMMATURE GRANS (ABS): 0.01 THOU/MM3 (ref 0–0.07)
IMMATURE GRANULOCYTES: 0.2 %
LYMPHOCYTES # BLD: 15 %
LYMPHOCYTES ABSOLUTE: 0.6 THOU/MM3 (ref 1–4.8)
MCH RBC QN AUTO: 31.1 PG (ref 26–33)
MCHC RBC AUTO-ENTMCNC: 30.2 GM/DL (ref 32.2–35.5)
MCV RBC AUTO: 102.9 FL (ref 80–94)
MONOCYTES # BLD: 12 %
MONOCYTES ABSOLUTE: 0.5 THOU/MM3 (ref 0.4–1.3)
NUCLEATED RED BLOOD CELLS: 0 /100 WBC
PLATELET # BLD: 131 THOU/MM3 (ref 130–400)
PMV BLD AUTO: 9.5 FL (ref 9.4–12.4)
POTASSIUM REFLEX MAGNESIUM: 6.2 MEQ/L (ref 3.5–5.2)
RBC # BLD: 3.15 MILL/MM3 (ref 4.7–6.1)
SEG NEUTROPHILS: 68.6 %
SEGMENTED NEUTROPHILS ABSOLUTE COUNT: 2.9 THOU/MM3 (ref 1.8–7.7)
SODIUM BLD-SCNC: 135 MEQ/L (ref 135–145)
WBC # BLD: 4.3 THOU/MM3 (ref 4.8–10.8)

## 2020-01-27 PROCEDURE — 6360000002 HC RX W HCPCS: Performed by: NURSE PRACTITIONER

## 2020-01-27 PROCEDURE — 6370000000 HC RX 637 (ALT 250 FOR IP): Performed by: NURSE PRACTITIONER

## 2020-01-27 PROCEDURE — 90935 HEMODIALYSIS ONE EVALUATION: CPT | Performed by: NURSE PRACTITIONER

## 2020-01-27 PROCEDURE — 6370000000 HC RX 637 (ALT 250 FOR IP): Performed by: INTERNAL MEDICINE

## 2020-01-27 PROCEDURE — 99232 SBSQ HOSP IP/OBS MODERATE 35: CPT | Performed by: NURSE PRACTITIONER

## 2020-01-27 PROCEDURE — 85025 COMPLETE CBC W/AUTO DIFF WBC: CPT

## 2020-01-27 PROCEDURE — 80048 BASIC METABOLIC PNL TOTAL CA: CPT

## 2020-01-27 PROCEDURE — 36415 COLL VENOUS BLD VENIPUNCTURE: CPT

## 2020-01-27 PROCEDURE — 90935 HEMODIALYSIS ONE EVALUATION: CPT

## 2020-01-27 PROCEDURE — 2060000000 HC ICU INTERMEDIATE R&B

## 2020-01-27 PROCEDURE — 2580000003 HC RX 258: Performed by: NURSE PRACTITIONER

## 2020-01-27 RX ORDER — SEVELAMER CARBONATE 800 MG/1
800 TABLET, FILM COATED ORAL
Status: DISCONTINUED | OUTPATIENT
Start: 2020-01-27 | End: 2020-01-27 | Stop reason: DRUGHIGH

## 2020-01-27 RX ORDER — SERTRALINE HYDROCHLORIDE 100 MG/1
100 TABLET, FILM COATED ORAL DAILY
Status: DISCONTINUED | OUTPATIENT
Start: 2020-01-27 | End: 2020-01-28 | Stop reason: HOSPADM

## 2020-01-27 RX ORDER — SEVELAMER CARBONATE 800 MG/1
1600 TABLET, FILM COATED ORAL
Status: DISCONTINUED | OUTPATIENT
Start: 2020-01-27 | End: 2020-01-28 | Stop reason: HOSPADM

## 2020-01-27 RX ADMIN — MULTIPLE VITAMINS W/ MINERALS TAB 1 TABLET: TAB at 13:10

## 2020-01-27 RX ADMIN — Medication 10 ML: at 20:00

## 2020-01-27 RX ADMIN — VITAMIN D, TAB 1000IU (100/BT) 2000 UNITS: 25 TAB at 13:09

## 2020-01-27 RX ADMIN — HEPARIN SODIUM 5000 UNITS: 5000 INJECTION INTRAVENOUS; SUBCUTANEOUS at 13:18

## 2020-01-27 RX ADMIN — FLUTICASONE PROPIONATE 1 SPRAY: 50 SPRAY, METERED NASAL at 13:10

## 2020-01-27 RX ADMIN — FAMOTIDINE 20 MG: 20 TABLET ORAL at 13:10

## 2020-01-27 RX ADMIN — CLONIDINE HYDROCHLORIDE 0.3 MG: 0.2 TABLET ORAL at 13:09

## 2020-01-27 RX ADMIN — ASPIRIN 81 MG: 81 TABLET ORAL at 13:10

## 2020-01-27 RX ADMIN — VERAPAMIL HYDROCHLORIDE 240 MG: 240 TABLET, FILM COATED, EXTENDED RELEASE ORAL at 06:35

## 2020-01-27 RX ADMIN — SERTRALINE 100 MG: 100 TABLET, FILM COATED ORAL at 13:14

## 2020-01-27 RX ADMIN — FOLIC ACID 1 MG: 1 TABLET ORAL at 13:10

## 2020-01-27 RX ADMIN — SEVELAMER CARBONATE 1600 MG: 800 TABLET, FILM COATED ORAL at 17:10

## 2020-01-27 RX ADMIN — ISOSORBIDE MONONITRATE 120 MG: 60 TABLET ORAL at 06:35

## 2020-01-27 RX ADMIN — HEPARIN SODIUM 5000 UNITS: 5000 INJECTION INTRAVENOUS; SUBCUTANEOUS at 06:36

## 2020-01-27 RX ADMIN — Medication 10 ML: at 13:11

## 2020-01-27 RX ADMIN — CLONIDINE HYDROCHLORIDE 0.3 MG: 0.2 TABLET ORAL at 06:36

## 2020-01-27 RX ADMIN — DOXAZOSIN 8 MG: 4 TABLET ORAL at 20:00

## 2020-01-27 RX ADMIN — DOXAZOSIN 8 MG: 4 TABLET ORAL at 06:36

## 2020-01-27 RX ADMIN — VITAMIN D, TAB 1000IU (100/BT) 2000 UNITS: 25 TAB at 20:00

## 2020-01-27 RX ADMIN — ATORVASTATIN CALCIUM 40 MG: 40 TABLET, FILM COATED ORAL at 20:00

## 2020-01-27 RX ADMIN — CLONIDINE HYDROCHLORIDE 0.3 MG: 0.2 TABLET ORAL at 20:00

## 2020-01-27 RX ADMIN — FLUTICASONE PROPIONATE 1 SPRAY: 50 SPRAY, METERED NASAL at 20:01

## 2020-01-27 ASSESSMENT — PAIN SCALES - GENERAL
PAINLEVEL_OUTOF10: 0
PAINLEVEL_OUTOF10: 0

## 2020-01-27 NOTE — CARE COORDINATION
Text paged NP Solomon about EKG results d/t not showing up in results review: \"NSR, Possible L atrial enlargement, L ventricular hypertrophy, inferior infarct-age undetermined, T wave abnormality (consider lateral ischemia)- Abnormal ECG\"  Georgette Jimenez

## 2020-01-27 NOTE — DISCHARGE INSTR - DIET
 Good nutrition is important when healing from an illness, injury, or surgery. Follow any nutrition recommendations given to you during your hospital stay.  If you were given an oral nutrition supplement while in the hospital, continue to take this supplement at home. You can take it with meals, in-between meals, and/or before bedtime. These supplements can be purchased at most local grocery stores, pharmacies, and chain super-stores.  If you have any questions about your diet or nutrition, call the hospital and ask for the dietitian. You are recommended to follow a renal (kidney) diet. 2000 ml fluid restrictions every 24 hours    What foods are good to eat? Eat food rich in calories and protein. Meats and proteins like:  Beef  Fish  Poultry  Pork  Eggs  Breads and grains like:  Cereals  Bread  Milk products like: Yogurt  Cream cheese  Ricotta cheese  Butter  Margarine  Heavy cream  Sherbet  Fruits like:  Apples  Berries  Cherries  Grapes  Peaches  Pears  Pineapples  Plums  Veggies like:  Broccoli  Cabbage  Carrots  Cauliflower  Celery  Cucumber  Eggplant  Lettuce  Peppers  Radish  Zucchini  Yellow squash  Talk to your doctor about these foods if you need to control your blood sugar. What foods should be limited or avoided? Stay away from food high in water, potassium, phosphorus, and sodium or salt. If potassium builds up in your blood, it may cause heart problems. Phosphorus, in large amount in your blood, can take away the calcium in your blood. This may weaken your bones. Salt or sodium can trap fluids in your body and cause high blood pressure.  Limit or stay away from eating these foods:  Breads and grains like:  Whole wheat bread  Brown rice  Fruits like:  Oranges  Kiwis  Prunes  Raisins  Bananas  Melons  Veggies like:  Potatoes  Tomatoes  Winter squash  Pumpkin  Asparagus  Avocados  Beets  Spinach  Parsnips  Seasonings like:  Soy sauce  Teriyaki sauce  Other foods and drinks like:  Canned foods  Chips  Restaurant foods  Coffee  Tea  Soda

## 2020-01-27 NOTE — PROGRESS NOTES
Nephrology Progress Note    Patient - Nakia Brooks   MRN -  [id number]   Acct # - [de-identified]      - 1965    50 y.o.   Admit Date: 2020  Hospital Day: 1  Location: --A  Date of evaluation -  2020    Subjective:   CC: shortness of breath   Denies shortness of breath on Room air   Pt seen during hemodialysis, Hemodynamically stable, goal Ultrafiltration 3 L. 2 K bath  \"I feel so much better\"  BP Range: Systolic (01BHV), [place]:096 , Min:130 , SQV:637      Diastolic (77JLO), QLI:47, Min:63, Max:100    Objective:   VITALS:  BP (!) 158/74   Pulse 96   Temp 98 °F (36.7 °C)   Resp 18   Ht 6' 3\" (1.905 m)   Wt 233 lb 0.4 oz (105.7 kg)   SpO2 91%   BMI 29.13 kg/m²    Patient Vitals for the past 24 hrs:   BP Temp Temp src Pulse Resp SpO2 Height Weight   20 0740 (!) 158/74 98 °F (36.7 °C) -- 96 18 -- -- 233 lb 0.4 oz (105.7 kg)   20 0630 (!) 181/88 -- -- 88 -- -- -- --   20 0419 (!) 163/79 98.5 °F (36.9 °C) Oral 85 16 91 % -- 233 lb 3 oz (105.8 kg)   20 0008 (!) 161/79 98.3 °F (36.8 °C) Oral 89 16 94 % -- --   20 1930 (!) 169/84 98.5 °F (36.9 °C) Oral 92 16 94 % -- --   20 1742 132/73 -- -- -- -- -- -- --   20 1704 -- -- -- -- -- 95 % -- --   20 1630 -- -- -- -- -- -- 6' 3\" (1.905 m) 231 lb 6.4 oz (105 kg)   20 1552 (!) 170/84 98.5 °F (36.9 °C) Oral 98 14 96 % -- --   20 1536 (!) 165/84 97.7 °F (36.5 °C) -- 98 15 -- -- --   20 1135 130/68 97.7 °F (36.5 °C) -- 71 15 -- -- --   20 1125 134/63 -- -- 69 19 -- -- --   20 1055 135/63 -- -- 66 17 -- -- --   20 1010 132/64 -- -- 65 17 -- -- --   20 0940 (!) 191/100 -- -- 88 29 -- -- --         Intake/Output Summary (Last 24 hours) at 2020 0905  Last data filed at 2020 0419  Gross per 24 hour   Intake 530 ml   Output 4900 ml   Net -4370 ml       Admission weight: 230 lb (104.3 kg)  Patient Vitals for the past 96 hrs (Last 3 readings):   Weight   20 0740 233 lb 0.4 oz (105.7 kg)   01/27/20 0419 233 lb 3 oz (105.8 kg)   01/26/20 1630 231 lb 6.4 oz (105 kg)     EXAM:  CONSTITUTIONAL:  No acute distress. Pleasant  HEENT:  Head is normocephalic, Extraocular movement intact. Neck is supple. Voice is clear. CARDIOVASCULAR:  S1, S2  regular rate and rhythm. RESPIRATORY: Clear to ausculation bilaterally. Equal breath sounds. No wheezes. No shortness of breath noted at rest.  ABDOMEN: soft, non tender  NEUROLOGICAL: Patient is alert and oriented to person, place, and time. Recent and remote memory intact. Thought is coherant. SKIN: no rash, No significant bruises on exposed surfaces  MUSCULOSKELETAL: Movement is coordinated. Moves all extremities   EXTREMITIES: Distal lower extremity temp is warm, trace lower extremity edema. Upper extremity AV Fistula   PSYCHIATRIC: mood and affect appropriate.     Medications:   Med reviewed  Scheduled Meds:   sertraline  100 mg Oral Daily    Sucroferric Oxyhydroxide  500 mg Oral TID WC    dextrose  25 g Intravenous Once    insulin regular  10 Units Intravenous Once    bumetanide  2 mg Intravenous Once    aspirin  81 mg Oral Daily    atorvastatin  40 mg Oral Nightly    cloNIDine  0.3 mg Oral TID    doxazosin  8 mg Oral 2 times per day    famotidine  20 mg Oral Daily    fluticasone  1 spray Nasal BID    folic acid  1 mg Oral Daily    isosorbide mononitrate  120 mg Oral Daily    therapeutic multivitamin-minerals  1 tablet Oral Daily    verapamil  240 mg Oral Daily    Vitamin D  2,000 Units Oral BID    sodium chloride flush  10 mL Intravenous 2 times per day    heparin (porcine)  5,000 Units Subcutaneous 3 times per day     PRN Meds traZODone, sodium chloride flush, magnesium hydroxide, ondansetron   Labs:   Labs reviewed  Recent Labs     01/26/20  0852 01/26/20  1633 01/27/20  0534    140 135   K 7.1* 4.8 6.2*    98 95*   CO2 23 28 28   BUN 69* 36* 46*   CREATININE 10.8* 6.7* 8.4*   LABGLOM 6* 11* 8* GLUCOSE 105 103 89   MG 2.9*  --   --    CALCIUM 8.9 8.7 8.5     Recent Labs     01/26/20  0852 01/27/20  0534   WBC 5.3 4.3*   RBC 3.13* 3.15*   HGB 9.8* 9.8*   HCT 31.9* 32.4*   .9* 102.9*   MCH 31.3 31.1    131       Xr Chest Portable  Result Date: 1/26/2020  1. Findings which are likely related to congestive heart failure changes which have slightly worsened since the prior exam. 2. Small right-sided pleural effusion. Summary 8/86/34   Systolic function was normal.   Severely increased left ventricle wall thickness. Ejection fraction is visually estimated at 60-65%. Left atrial size was normal.   No evidence of thrombus within left atrial appendage. Aortic valve appears tricuspid. increase flow through the LVOT with LVOT obstruction and systolic anterior   motion of the mitral valve. Mild mitral regurgitation is present. Mild to moderate systolic anterior motion (CASSIE) of anterior leaflet. Aortic dissection extending from the aortic arch to the descending aorta . ASSESSMENT:  1. End Stage Renal Disease 2nd to diabetic and hypertensive nephrosclerosis on Hemodialysis, AV fistula. Volume overload improving. Pt states that he has not been eating right last month due to issues at home with his 80 yr old mother in law. Add 1500 ml fluid restriction. Walter Covert for discharge from renal aspect, FU with Hemodialysis tomorrow per out pt schedule. 2. Hyperkalemia, 2nd to ESRD, 2 K bath. Low K bath. Renal diet  3. Essential Hypertension with nephrosclerosis, BP improved, continue current meds  4. Hyperphosphatemia   5. Secondary hyperparathyroidism of renal origin  6. Hx of AAA dissection    Active Problems:    Volume overload  Resolved Problems:    * No resolved hospital problems.  Carlie Trinidad, WADE - CNP 9:05 AM 1/27/2020

## 2020-01-27 NOTE — PLAN OF CARE
Problem: Fluid Volume:  Goal: Hemodynamic stability will improve  Description  Hemodynamic stability will improve  1/26/2020 2134 by Heraclio Palomo RN  Outcome: Ongoing  Note:   Vitals:    01/26/20 1630 01/26/20 1704 01/26/20 1742 01/26/20 1930   BP:   132/73 (!) 169/84   Pulse:    92   Resp:    16   Temp:    98.5 °F (36.9 °C)   TempSrc:    Oral   SpO2:  95%  94%   Weight: 231 lb 6.4 oz (105 kg)      Height: 6' 3\" (1.905 m)          1/26/2020 1804 by Godfrey Woods RN  Outcome: Ongoing     Problem: Health Behavior:  Goal: Identification of resources available to assist in meeting health care needs will improve  Description  Identification of resources available to assist in meeting health care needs will improve  1/26/2020 2134 by Heraclio Palomo RN  Outcome: Ongoing  Note:   Pt missed dialysis yesterday- pt understanding importance of making appt on time so he does not end up in the hospital again  1/26/2020 1804 by Godfrey Woods RN  Outcome: Ongoing     Problem: Respiratory:  Goal: Respiratory status will improve  Description  Respiratory status will improve  1/26/2020 2134 by Heraclio Palomo RN  Outcome: Ongoing  Note:   Pt on room air  1/26/2020 1804 by Godfrey Woods RN  Outcome: Ongoing  Note:   Denies SOB at rest or exertion. Pulse ox remains above 93% on RA. Lung sounds-clear. Problem:  Activity:  Goal: Risk for activity intolerance will decrease  Description  Risk for activity intolerance will decrease  1/26/2020 2134 by Heraclio Palomo RN  Outcome: Ongoing  Note:   Pt ambulating independently  1/26/2020 1804 by Godfrey Woods RN  Outcome: Ongoing     Problem: Coping:  Goal: Ability to identify and develop effective coping behavior will improve  Description  Ability to identify and develop effective coping behavior will improve  1/26/2020 2134 by Heraclio Palomo RN  Outcome: Ongoing  Note:   Pt understanding importance of compliance with appt's and taking medications on time  1/26/2020 1804 by Minh Chaparro RN  Outcome: Ongoing     Problem: Nutritional:  Goal: Maintenance of adequate nutrition will be supported  Description  Maintenance of adequate nutrition will be supported  1/26/2020 2134 by Tanja Gonzalez RN  Outcome: Ongoing  Note:   Pt tolerating renal diet  1/26/2020 1804 by Minh Chaparro RN  Outcome: Ongoing     Problem: Physical Regulation:  Goal: Complications related to the disease process, condition or treatment will be avoided or minimized  Description  Complications related to the disease process, condition or treatment will be avoided or minimized  1/26/2020 2134 by Tanja Gonzalez RN  Outcome: Ongoing  Note:   Pt had missed dialysis- came in volume overloaded. 4.5L removed today, plans for dialysis again in the AM  1/26/2020 1804 by Minh Chaparro RN  Outcome: Ongoing     Problem: Urinary Elimination:  Goal: Ability to achieve and maintain adequate urine output will be supported  Description  Ability to achieve and maintain adequate urine output will be supported  1/26/2020 2134 by Tanja Gonzalez RN  Outcome: Ongoing  Note:   Pt only urinates occasionally because of dialysis- has not gone today  1/26/2020 1804 by Minh Chaparro RN  Outcome: Ongoing     Care plan reviewed with patient. Patient verbalize understanding of the plan of care and contribute to goal setting.

## 2020-01-27 NOTE — PROGRESS NOTES
Units Oral BID    sodium chloride flush  10 mL Intravenous 2 times per day    heparin (porcine)  5,000 Units Subcutaneous 3 times per day     PRN Meds: traZODone, sodium chloride flush, magnesium hydroxide, ondansetron      Intake/Output Summary (Last 24 hours) at 1/27/2020 0714  Last data filed at 1/27/2020 0419  Gross per 24 hour   Intake 530 ml   Output 4900 ml   Net -4370 ml       Diet:  DIET RENAL; Daily Fluid Restriction: 2000 ml    Exam:  BP (!) 181/88   Pulse 88   Temp 98.5 °F (36.9 °C) (Oral)   Resp 16   Ht 6' 3\" (1.905 m)   Wt 233 lb 3 oz (105.8 kg)   SpO2 91%   BMI 29.15 kg/m²   General appearance: No apparent distress, appears stated age, and cooperative. HEENT: Pupils equal, round, and reactive to light. Conjunctivae/corneas clear. Neck: Supple, with full range of motion. No jugular venous distention. Trachea midline. Respiratory:  Normal respiratory effort. Rales left base/clear to auscultation, bilaterally without Wheezes/Rhonchi. Cardiovascular: Regular rate and rhythm with normal S1/S2 without murmurs, rubs, or gallops. Abdomen: Soft, non-tender, non-distended with normal bowel sounds. Musculoskeletal: passive and active ROM x 4 extremities. Skin: Skin color normal for ehtnicity, texture dry, turgor normal.  No rashes or lesions. Neurologic:  Neurovascularly intact without any focal sensory/motor deficits. Cranial nerves: II-XII intact, grossly non-focal.  Psychiatric: Alert and oriented, thought content appropriate, normal insight. Capillary Refill: Brisk,< 3 seconds. Peripheral Pulses: +2 palpable, equal bilaterally. Trace BLE edema.        Labs:   Recent Labs     01/26/20  0852 01/27/20  0534   WBC 5.3 4.3*   HGB 9.8* 9.8*   HCT 31.9* 32.4*    131     Recent Labs     01/26/20  0852 01/26/20  1633 01/27/20  0534    140 135   K 7.1* 4.8 6.2*    98 95*   CO2 23 28 28   BUN 69* 36* 46*   CREATININE 10.8* 6.7* 8.4*   CALCIUM 8.9 8.7 8.5     No results for FINDINGS: An endograft is again seen throughout the thoracic aorta. There is stable aneurysmal dilatation of the aortic arch. There is cardiomegaly and pulmonary vascular congestion which has worsened since the previous exam. There is blunting at the right costophrenic angle which may represent a small pleural effusion. Patchy opacities are seen at the right lung base. Visualized portions of the upper abdomen are within normal limits. The osseous structures are intact. No acute fractures or suspicious osseous lesions. 1. Findings which are likely related to congestive heart failure changes which have slightly worsened since the prior exam. 2. Small right-sided pleural effusion. **This report has been created using voice recognition software. It may contain minor errors which are inherent in voice recognition technology. ** Final report electronically signed by Dr Temo Aden on 1/26/2020 9:26 AM      Electronically signed by WADE Mix CNP on 1/27/2020 at 7:14 AM

## 2020-01-28 VITALS
WEIGHT: 227.96 LBS | HEIGHT: 75 IN | OXYGEN SATURATION: 94 % | DIASTOLIC BLOOD PRESSURE: 86 MMHG | BODY MASS INDEX: 28.34 KG/M2 | SYSTOLIC BLOOD PRESSURE: 173 MMHG | RESPIRATION RATE: 16 BRPM | HEART RATE: 92 BPM | TEMPERATURE: 98.6 F

## 2020-01-28 LAB
ANION GAP SERPL CALCULATED.3IONS-SCNC: 11 MEQ/L (ref 8–16)
BUN BLDV-MCNC: 33 MG/DL (ref 7–22)
CALCIUM SERPL-MCNC: 8.6 MG/DL (ref 8.5–10.5)
CHLORIDE BLD-SCNC: 99 MEQ/L (ref 98–111)
CO2: 27 MEQ/L (ref 23–33)
CREAT SERPL-MCNC: 6.7 MG/DL (ref 0.4–1.2)
ERYTHROCYTE [DISTWIDTH] IN BLOOD BY AUTOMATED COUNT: 13.3 % (ref 11.5–14.5)
ERYTHROCYTE [DISTWIDTH] IN BLOOD BY AUTOMATED COUNT: 50.1 FL (ref 35–45)
GFR SERPL CREATININE-BSD FRML MDRD: 11 ML/MIN/1.73M2
GLUCOSE BLD-MCNC: 87 MG/DL (ref 70–108)
HCT VFR BLD CALC: 33.7 % (ref 42–52)
HEMOGLOBIN: 10.3 GM/DL (ref 14–18)
MCH RBC QN AUTO: 31.3 PG (ref 26–33)
MCHC RBC AUTO-ENTMCNC: 30.6 GM/DL (ref 32.2–35.5)
MCV RBC AUTO: 102.4 FL (ref 80–94)
PLATELET # BLD: 147 THOU/MM3 (ref 130–400)
PMV BLD AUTO: 10.3 FL (ref 9.4–12.4)
POTASSIUM SERPL-SCNC: 6.5 MEQ/L (ref 3.5–5.2)
RBC # BLD: 3.29 MILL/MM3 (ref 4.7–6.1)
SODIUM BLD-SCNC: 137 MEQ/L (ref 135–145)
WBC # BLD: 4.7 THOU/MM3 (ref 4.8–10.8)

## 2020-01-28 PROCEDURE — 6370000000 HC RX 637 (ALT 250 FOR IP): Performed by: INTERNAL MEDICINE

## 2020-01-28 PROCEDURE — 36415 COLL VENOUS BLD VENIPUNCTURE: CPT

## 2020-01-28 PROCEDURE — 6370000000 HC RX 637 (ALT 250 FOR IP): Performed by: NURSE PRACTITIONER

## 2020-01-28 PROCEDURE — 99232 SBSQ HOSP IP/OBS MODERATE 35: CPT | Performed by: NURSE PRACTITIONER

## 2020-01-28 PROCEDURE — 2580000003 HC RX 258: Performed by: NURSE PRACTITIONER

## 2020-01-28 PROCEDURE — 80048 BASIC METABOLIC PNL TOTAL CA: CPT

## 2020-01-28 PROCEDURE — 85027 COMPLETE CBC AUTOMATED: CPT

## 2020-01-28 PROCEDURE — 99238 HOSP IP/OBS DSCHRG MGMT 30/<: CPT | Performed by: NURSE PRACTITIONER

## 2020-01-28 PROCEDURE — 6360000002 HC RX W HCPCS: Performed by: NURSE PRACTITIONER

## 2020-01-28 RX ORDER — CLONIDINE HYDROCHLORIDE 0.3 MG/1
0.3 TABLET ORAL 3 TIMES DAILY
Qty: 30 TABLET | Refills: 3 | Status: SHIPPED | OUTPATIENT
Start: 2020-01-28 | End: 2022-09-28

## 2020-01-28 RX ORDER — LANOLIN ALCOHOL/MO/W.PET/CERES
100 CREAM (GRAM) TOPICAL DAILY
Qty: 30 TABLET | Refills: 0 | Status: ON HOLD | OUTPATIENT
Start: 2020-01-28 | End: 2020-02-25

## 2020-01-28 RX ORDER — SODIUM POLYSTYRENE SULFONATE 15 G/60ML
15 SUSPENSION ORAL; RECTAL ONCE
Status: COMPLETED | OUTPATIENT
Start: 2020-01-28 | End: 2020-01-28

## 2020-01-28 RX ORDER — DOXAZOSIN 8 MG/1
8 TABLET ORAL EVERY 12 HOURS SCHEDULED
Qty: 14 TABLET | Refills: 3 | Status: ON HOLD | OUTPATIENT
Start: 2020-01-28 | End: 2022-10-15 | Stop reason: HOSPADM

## 2020-01-28 RX ORDER — FAMOTIDINE 20 MG/1
20 TABLET, FILM COATED ORAL DAILY
Qty: 60 TABLET | Refills: 0 | Status: SHIPPED | OUTPATIENT
Start: 2020-01-28

## 2020-01-28 RX ORDER — MINOXIDIL 10 MG/1
15 TABLET ORAL EVERY 12 HOURS
Qty: 21 TABLET | Refills: 3 | Status: ON HOLD | OUTPATIENT
Start: 2020-01-28 | End: 2020-03-13

## 2020-01-28 RX ORDER — 0.9 % SODIUM CHLORIDE 0.9 %
10 VIAL (ML) INJECTION EVERY 12 HOURS SCHEDULED
Status: DISCONTINUED | OUTPATIENT
Start: 2020-01-28 | End: 2020-01-28

## 2020-01-28 RX ORDER — 0.9 % SODIUM CHLORIDE 0.9 %
10 VIAL (ML) INJECTION PRN
Status: DISCONTINUED | OUTPATIENT
Start: 2020-01-28 | End: 2020-01-28

## 2020-01-28 RX ORDER — SODIUM CHLORIDE 450 MG/100ML
INJECTION, SOLUTION INTRAVENOUS CONTINUOUS
Status: DISCONTINUED | OUTPATIENT
Start: 2020-01-28 | End: 2020-01-28

## 2020-01-28 RX ADMIN — ISOSORBIDE MONONITRATE 120 MG: 60 TABLET ORAL at 07:52

## 2020-01-28 RX ADMIN — DOXAZOSIN 8 MG: 4 TABLET ORAL at 07:50

## 2020-01-28 RX ADMIN — VITAMIN D, TAB 1000IU (100/BT) 2000 UNITS: 25 TAB at 07:51

## 2020-01-28 RX ADMIN — FAMOTIDINE 20 MG: 20 TABLET ORAL at 07:50

## 2020-01-28 RX ADMIN — Medication 10 ML: at 07:52

## 2020-01-28 RX ADMIN — FLUTICASONE PROPIONATE 1 SPRAY: 50 SPRAY, METERED NASAL at 07:51

## 2020-01-28 RX ADMIN — HEPARIN SODIUM 5000 UNITS: 5000 INJECTION INTRAVENOUS; SUBCUTANEOUS at 06:22

## 2020-01-28 RX ADMIN — SODIUM POLYSTYRENE SULFONATE 15 G: 15 SUSPENSION ORAL; RECTAL at 10:08

## 2020-01-28 RX ADMIN — ASPIRIN 81 MG: 81 TABLET ORAL at 07:50

## 2020-01-28 RX ADMIN — FOLIC ACID 1 MG: 1 TABLET ORAL at 07:52

## 2020-01-28 RX ADMIN — MULTIPLE VITAMINS W/ MINERALS TAB 1 TABLET: TAB at 07:52

## 2020-01-28 RX ADMIN — SEVELAMER CARBONATE 1600 MG: 800 TABLET, FILM COATED ORAL at 07:51

## 2020-01-28 RX ADMIN — CLONIDINE HYDROCHLORIDE 0.3 MG: 0.2 TABLET ORAL at 07:50

## 2020-01-28 RX ADMIN — SERTRALINE 100 MG: 100 TABLET, FILM COATED ORAL at 07:52

## 2020-01-28 RX ADMIN — VERAPAMIL HYDROCHLORIDE 240 MG: 240 TABLET, FILM COATED, EXTENDED RELEASE ORAL at 07:50

## 2020-01-28 ASSESSMENT — PAIN SCALES - GENERAL: PAINLEVEL_OUTOF10: 0

## 2020-01-28 NOTE — PROGRESS NOTES
Nephrology Progress Note    Patient - Chayo Lyon   MRN -  [id number]   Acct # - [de-identified]      - 1965    50 y.o. Admit Date: 2020  Hospital Day: 2  Location: [facility]  Date of evaluation -  2020    Subjective:   CC: shortness of breath   Denies shortness of breath on Room air   Good appetite  BP Range: Systolic (69EPT), DBX:590 , Min:148 , LAS:222      Diastolic (34JCQ), FWY:34, Min:77, Max:91    Objective:   VITALS:  BP (!) 173/86   Pulse 92   Temp 98.6 °F (37 °C) (Oral)   Resp 16   Ht 6' 3\" (1.905 m)   Wt 227 lb 15.3 oz (103.4 kg)   SpO2 94%   BMI 28.49 kg/m²    Patient Vitals for the past 24 hrs:   BP Temp Temp src Pulse Resp SpO2 Weight   20 0750 (!) 173/86 98.6 °F (37 °C) Oral 92 16 94 % --   20 0313 (!) 167/77 97.7 °F (36.5 °C) Oral 84 18 96 % 227 lb 15.3 oz (103.4 kg)   20 0011 (!) 170/84 98.3 °F (36.8 °C) Oral 87 16 94 % --   20 1945 (!) 165/82 98.8 °F (37.1 °C) Oral 89 17 95 % --   20 1530 (!) 148/77 98.6 °F (37 °C) Oral 87 16 94 % --   20 1300 (!) 172/82 98.6 °F (37 °C) Oral 79 16 93 % --   20 1230 (!) 179/91 98.3 °F (36.8 °C) -- 92 16 -- 225 lb 5 oz (102.2 kg)         Intake/Output Summary (Last 24 hours) at 2020 0924  Last data filed at 2020 0313  Gross per 24 hour   Intake 780 ml   Output 3900 ml   Net -3120 ml       Admission weight: 230 lb (104.3 kg)  Patient Vitals for the past 96 hrs (Last 3 readings):   Weight   20 0313 227 lb 15.3 oz (103.4 kg)   20 1230 225 lb 5 oz (102.2 kg)   20 0740 233 lb 0.4 oz (105.7 kg)     EXAM:  CONSTITUTIONAL:  No acute distress. Pleasant  HEENT:  Head is normocephalic, Extraocular movement intact. Neck is supple. Voice is clear. CARDIOVASCULAR:  S1, S2  regular rate and rhythm. RESPIRATORY: Clear to ausculation bilaterally. Equal breath sounds. No wheezes.  No shortness of breath noted at rest.  ABDOMEN: soft, non tender  NEUROLOGICAL: Patient is alert and oriented to person, place, and time. Recent and remote memory intact. Thought is coherant. SKIN: no rash, No significant bruises on exposed surfaces  MUSCULOSKELETAL: Movement is coordinated. Moves all extremities   EXTREMITIES: Distal lower extremity temp is warm, No lower extremity edema. Upper extremity AV Fistula   PSYCHIATRIC: mood and affect appropriate. Medications:   Med reviewed  Scheduled Meds:   sertraline  100 mg Oral Daily    [Held by provider] Sucroferric Oxyhydroxide  500 mg Oral TID WC    sevelamer  1,600 mg Oral TID WC    dextrose  25 g Intravenous Once    insulin regular  10 Units Intravenous Once    bumetanide  2 mg Intravenous Once    aspirin  81 mg Oral Daily    atorvastatin  40 mg Oral Nightly    cloNIDine  0.3 mg Oral TID    doxazosin  8 mg Oral 2 times per day    famotidine  20 mg Oral Daily    fluticasone  1 spray Nasal BID    folic acid  1 mg Oral Daily    isosorbide mononitrate  120 mg Oral Daily    therapeutic multivitamin-minerals  1 tablet Oral Daily    verapamil  240 mg Oral Daily    Vitamin D  2,000 Units Oral BID    sodium chloride flush  10 mL Intravenous 2 times per day    heparin (porcine)  5,000 Units Subcutaneous 3 times per day     PRN Meds traZODone, sodium chloride flush, magnesium hydroxide, ondansetron   Labs:   Labs reviewed  Recent Labs     01/26/20  0852 01/26/20  1633 01/27/20  0534 01/28/20  0537    140 135 137   K 7.1* 4.8 6.2* 6.5*    98 95* 99   CO2 23 28 28 27   BUN 69* 36* 46* 33*   CREATININE 10.8* 6.7* 8.4* 6.7*   LABGLOM 6* 11* 8* 11*   GLUCOSE 105 103 89 87   MG 2.9*  --   --   --    CALCIUM 8.9 8.7 8.5 8.6     Recent Labs     01/26/20  0852 01/27/20  0534 01/28/20  0537   WBC 5.3 4.3* 4.7*   RBC 3.13* 3.15* 3.29*   HGB 9.8* 9.8* 10.3*   HCT 31.9* 32.4* 33.7*   .9* 102.9* 102.4*   MCH 31.3 31.1 31.3    131 147      Summary 7/11/95   Systolic function was normal.   Severely increased left ventricle wall thickness.

## 2020-01-28 NOTE — DISCHARGE SUMMARY
Discharge Summary Note      Patient:  Fady Fishman    Unit/Bed:4K-09/009-A  YOB: 1965  MRN: [id number]   Acct: [de-identified]   PCP: No primary care provider on file. Date of Admission: 1/26/2020   Date of Discharge:  1/28/2020    Discharge Assessment/Plan:    1. Volume Overload:  secondary to missed dialysis treatment and acute on chronic diastolic heart failure. S/P bumex x 1 in ED. IP dialysis 1/26. Nephrology following-dialysis again today. Daily weights. Strict intake and output. 2. Hyperkalemia:  (Reoccurrance) due to #1. On admission treated in ED with Calcium gluconate, D50, insulin and NA Bicarb. Will improve with dialysis, 3rd treatment today. 3. Accelerated Hypertension:  due to #1. Systolic's continue to range 160s to 180s. Patient to have hemodialysis this morning. Continue Catapres, Cardura, Imdur,, verapamil. 4. Acute on Chronic Diastolic Heart Failure: Related to #1. Echocardiogram on 3/21/2018 demonstrated EF 60 to 65%. Chest x-ray on admission demonstrated small right-sided pleural effusion and changes related to heart failure. Plan per #1.  5. Leukopenia:  (Resolved) WBC 4.7 from 5.3 on admission. Likely reactive in nature. No infectious signs or symptoms. Trend. 6. ESRD on Hemodialysis:  Usually TT, Th, Sat dialysis pateint. S/P dialysis 1/26, 1/27-to resume ususal schedule today. Continue Renvela and multivitamins. Continue renal diet with 1500 mL fluid restriction. 7. Chronically Elevated Troponin: due to ESRD. EKG on 1/26/2020 at 1805 demonstrated normal sinus rhythm. Patient denies chest pain. 8. Anemia of Chronic Disease: (Stable) hemoglobin  10.3/hematocrit 33.7. No active signs of bleeding. Trend. 9. Hyperlipidemia:  continue statin. 10. HX Cocaine Abuse: Tox screen pending-not completed. 11. Secondary Hyperparathyroidism:  secondary to ESRD.   Continue vitamin D.  12. History of AAA with release tablet  Commonly known as:  IMDUR  Take 1 tablet by mouth daily     minoxidil 10 MG tablet  Commonly known as:  LONITEN  Take 1.5 tablets by mouth every 12 hours for 28 days     sertraline 100 MG tablet  Commonly known as:  ZOLOFT     therapeutic multivitamin-minerals tablet     thiamine 100 MG tablet  Take 1 tablet by mouth daily     traZODone 50 MG tablet  Commonly known as:  DESYREL  Take 1 tablet by mouth nightly as needed for Sleep     Velphoro 500 MG Chew  Generic drug:  Sucroferric Oxyhydroxide     verapamil 240 MG extended release tablet  Commonly known as:  Calan SR  Take 1 tablet by mouth daily for 7 days     vitamin D 1000 UNIT Tabs tablet  Commonly known as:  CHOLECALCIFEROL  Take 2 tablets by mouth 2 times daily           Where to Get Your Medications      These medications were sent to [facility] [id number] #[id number] - [place], [place] - [address] [facility] P [phone] - F [phone]  . Milagros Jones [address] [place] [id number]    Phone:  [phone]   · cloNIDine 0.3 MG tablet  · doxazosin 8 MG tablet  · famotidine 20 MG tablet  · minoxidil 10 MG tablet  · thiamine 100 MG tablet         Intake/Output Summary (Last 24 hours) at 1/28/2020 0735  Last data filed at 1/28/2020 0313  Gross per 24 hour   Intake 900 ml   Output 3900 ml   Net -3000 ml       Diet:  DIET RENAL; Daily Fluid Restriction: 1500 ml    Exam:  BP (!) 167/77   Pulse 84   Temp 97.7 °F (36.5 °C) (Oral)   Resp 18   Ht 6' 3\" (1.905 m)   Wt 227 lb 15.3 oz (103.4 kg)   SpO2 96%   BMI 28.49 kg/m²   General appearance: No apparent distress, appears stated age, and cooperative. HEENT: Pupils equal, round, and reactive to light. Conjunctivae/corneas clear. Neck: Supple, with full range of motion. No jugular venous distention. Trachea midline. Respiratory:  Normal respiratory effort. Rales left base/clear to auscultation, bilaterally without Wheezes/Rhonchi.   Cardiovascular: Regular rate and rhythm with normal S1/S2 without murmurs, rubs, or discharge.   Electronically signed by WADE Guardado CNP on 1/28/2020 at 7:35 AM

## 2020-02-25 ENCOUNTER — APPOINTMENT (OUTPATIENT)
Dept: GENERAL RADIOLOGY | Age: 55
End: 2020-02-25
Payer: MEDICARE

## 2020-02-25 ENCOUNTER — HOSPITAL ENCOUNTER (OUTPATIENT)
Age: 55
Setting detail: OBSERVATION
Discharge: HOME | End: 2020-02-26
Attending: INTERNAL MEDICINE | Admitting: INTERNAL MEDICINE
Payer: MEDICARE

## 2020-02-25 PROBLEM — R07.9 CHEST PAIN: Status: ACTIVE | Noted: 2020-02-25

## 2020-02-25 LAB
ALBUMIN SERPL-MCNC: 3.7 G/DL (ref 3.5–5.1)
ALP BLD-CCNC: 66 U/L (ref 38–126)
ALT SERPL-CCNC: 9 U/L (ref 11–66)
ANION GAP SERPL CALCULATED.3IONS-SCNC: 18 MEQ/L (ref 8–16)
AST SERPL-CCNC: 12 U/L (ref 5–40)
BASOPHILS # BLD: 0.1 %
BASOPHILS ABSOLUTE: 0 THOU/MM3 (ref 0–0.1)
BILIRUB SERPL-MCNC: 0.3 MG/DL (ref 0.3–1.2)
BILIRUBIN DIRECT: < 0.2 MG/DL (ref 0–0.3)
BUN BLDV-MCNC: 51 MG/DL (ref 7–22)
CALCIUM SERPL-MCNC: 9.4 MG/DL (ref 8.5–10.5)
CHLORIDE BLD-SCNC: 102 MEQ/L (ref 98–111)
CO2: 23 MEQ/L (ref 23–33)
CREAT SERPL-MCNC: 9.8 MG/DL (ref 0.4–1.2)
EKG ATRIAL RATE: 105 BPM
EKG ATRIAL RATE: 117 BPM
EKG P AXIS: 47 DEGREES
EKG P AXIS: 50 DEGREES
EKG P-R INTERVAL: 170 MS
EKG P-R INTERVAL: 182 MS
EKG Q-T INTERVAL: 342 MS
EKG Q-T INTERVAL: 370 MS
EKG QRS DURATION: 90 MS
EKG QRS DURATION: 94 MS
EKG QTC CALCULATION (BAZETT): 477 MS
EKG QTC CALCULATION (BAZETT): 489 MS
EKG R AXIS: -28 DEGREES
EKG R AXIS: -36 DEGREES
EKG T AXIS: 100 DEGREES
EKG T AXIS: 108 DEGREES
EKG VENTRICULAR RATE: 105 BPM
EKG VENTRICULAR RATE: 117 BPM
EOSINOPHIL # BLD: 1.9 %
EOSINOPHILS ABSOLUTE: 0.1 THOU/MM3 (ref 0–0.4)
ERYTHROCYTE [DISTWIDTH] IN BLOOD BY AUTOMATED COUNT: 14 % (ref 11.5–14.5)
ERYTHROCYTE [DISTWIDTH] IN BLOOD BY AUTOMATED COUNT: 52.4 FL (ref 35–45)
GFR SERPL CREATININE-BSD FRML MDRD: 7 ML/MIN/1.73M2
GLUCOSE BLD-MCNC: 114 MG/DL (ref 70–108)
HCT VFR BLD CALC: 32.2 % (ref 42–52)
HEMOGLOBIN: 9.7 GM/DL (ref 14–18)
IMMATURE GRANS (ABS): 0.03 THOU/MM3 (ref 0–0.07)
IMMATURE GRANULOCYTES: 0.4 %
LYMPHOCYTES # BLD: 11.4 %
LYMPHOCYTES ABSOLUTE: 0.8 THOU/MM3 (ref 1–4.8)
MCH RBC QN AUTO: 31.1 PG (ref 26–33)
MCHC RBC AUTO-ENTMCNC: 30.1 GM/DL (ref 32.2–35.5)
MCV RBC AUTO: 103.2 FL (ref 80–94)
MONOCYTES # BLD: 9.7 %
MONOCYTES ABSOLUTE: 0.6 THOU/MM3 (ref 0.4–1.3)
NUCLEATED RED BLOOD CELLS: 0 /100 WBC
OSMOLALITY CALCULATION: 299.5 MOSMOL/KG (ref 275–300)
PLATELET # BLD: 140 THOU/MM3 (ref 130–400)
PMV BLD AUTO: 10 FL (ref 9.4–12.4)
POTASSIUM REFLEX MAGNESIUM: 4.9 MEQ/L (ref 3.5–5.2)
RBC # BLD: 3.12 MILL/MM3 (ref 4.7–6.1)
SEG NEUTROPHILS: 76.5 %
SEGMENTED NEUTROPHILS ABSOLUTE COUNT: 5.1 THOU/MM3 (ref 1.8–7.7)
SODIUM BLD-SCNC: 143 MEQ/L (ref 135–145)
TOTAL PROTEIN: 7 G/DL (ref 6.1–8)
TROPONIN T: 0.07 NG/ML
TROPONIN T: 0.07 NG/ML
TROPONIN T: 0.08 NG/ML
WBC # BLD: 6.7 THOU/MM3 (ref 4.8–10.8)

## 2020-02-25 PROCEDURE — 96372 THER/PROPH/DIAG INJ SC/IM: CPT

## 2020-02-25 PROCEDURE — G0378 HOSPITAL OBSERVATION PER HR: HCPCS

## 2020-02-25 PROCEDURE — 6370000000 HC RX 637 (ALT 250 FOR IP): Performed by: PHYSICIAN ASSISTANT

## 2020-02-25 PROCEDURE — 71045 X-RAY EXAM CHEST 1 VIEW: CPT

## 2020-02-25 PROCEDURE — 6370000000 HC RX 637 (ALT 250 FOR IP): Performed by: STUDENT IN AN ORGANIZED HEALTH CARE EDUCATION/TRAINING PROGRAM

## 2020-02-25 PROCEDURE — 2580000003 HC RX 258: Performed by: STUDENT IN AN ORGANIZED HEALTH CARE EDUCATION/TRAINING PROGRAM

## 2020-02-25 PROCEDURE — 93005 ELECTROCARDIOGRAM TRACING: CPT | Performed by: PHYSICIAN ASSISTANT

## 2020-02-25 PROCEDURE — 93010 ELECTROCARDIOGRAM REPORT: CPT | Performed by: INTERNAL MEDICINE

## 2020-02-25 PROCEDURE — 85025 COMPLETE CBC W/AUTO DIFF WBC: CPT

## 2020-02-25 PROCEDURE — 99285 EMERGENCY DEPT VISIT HI MDM: CPT

## 2020-02-25 PROCEDURE — 36415 COLL VENOUS BLD VENIPUNCTURE: CPT

## 2020-02-25 PROCEDURE — 6360000002 HC RX W HCPCS: Performed by: PHYSICIAN ASSISTANT

## 2020-02-25 PROCEDURE — 84484 ASSAY OF TROPONIN QUANT: CPT

## 2020-02-25 PROCEDURE — 94761 N-INVAS EAR/PLS OXIMETRY MLT: CPT

## 2020-02-25 PROCEDURE — 96374 THER/PROPH/DIAG INJ IV PUSH: CPT

## 2020-02-25 PROCEDURE — 80048 BASIC METABOLIC PNL TOTAL CA: CPT

## 2020-02-25 PROCEDURE — 6360000002 HC RX W HCPCS: Performed by: STUDENT IN AN ORGANIZED HEALTH CARE EDUCATION/TRAINING PROGRAM

## 2020-02-25 PROCEDURE — 80076 HEPATIC FUNCTION PANEL: CPT

## 2020-02-25 RX ORDER — MINOXIDIL 2.5 MG/1
15 TABLET ORAL EVERY 12 HOURS
Status: DISCONTINUED | OUTPATIENT
Start: 2020-02-25 | End: 2020-02-26 | Stop reason: HOSPADM

## 2020-02-25 RX ORDER — DOXAZOSIN MESYLATE 4 MG/1
8 TABLET ORAL EVERY 12 HOURS SCHEDULED
Status: DISCONTINUED | OUTPATIENT
Start: 2020-02-25 | End: 2020-02-26 | Stop reason: HOSPADM

## 2020-02-25 RX ORDER — HYDROXYZINE HYDROCHLORIDE 25 MG/1
50 TABLET, FILM COATED ORAL 3 TIMES DAILY PRN
Status: DISCONTINUED | OUTPATIENT
Start: 2020-02-25 | End: 2020-02-26 | Stop reason: HOSPADM

## 2020-02-25 RX ORDER — NITROGLYCERIN 0.4 MG/1
0.4 TABLET SUBLINGUAL EVERY 5 MIN PRN
Status: DISCONTINUED | OUTPATIENT
Start: 2020-02-25 | End: 2020-02-26 | Stop reason: HOSPADM

## 2020-02-25 RX ORDER — ISOSORBIDE MONONITRATE 60 MG/1
120 TABLET, EXTENDED RELEASE ORAL DAILY
Status: DISCONTINUED | OUTPATIENT
Start: 2020-02-26 | End: 2020-02-26 | Stop reason: HOSPADM

## 2020-02-25 RX ORDER — ATORVASTATIN CALCIUM 40 MG/1
40 TABLET, FILM COATED ORAL NIGHTLY
Status: DISCONTINUED | OUTPATIENT
Start: 2020-02-25 | End: 2020-02-26 | Stop reason: HOSPADM

## 2020-02-25 RX ORDER — ACETAMINOPHEN 650 MG/1
650 SUPPOSITORY RECTAL EVERY 6 HOURS PRN
Status: DISCONTINUED | OUTPATIENT
Start: 2020-02-25 | End: 2020-02-26 | Stop reason: HOSPADM

## 2020-02-25 RX ORDER — THIAMINE MONONITRATE (VIT B1) 100 MG
100 TABLET ORAL DAILY
Status: DISCONTINUED | OUTPATIENT
Start: 2020-02-25 | End: 2020-02-26 | Stop reason: HOSPADM

## 2020-02-25 RX ORDER — HEPARIN SODIUM 5000 [USP'U]/ML
5000 INJECTION, SOLUTION INTRAVENOUS; SUBCUTANEOUS EVERY 8 HOURS SCHEDULED
Status: DISCONTINUED | OUTPATIENT
Start: 2020-02-25 | End: 2020-02-26 | Stop reason: HOSPADM

## 2020-02-25 RX ORDER — TRAZODONE HYDROCHLORIDE 50 MG/1
50 TABLET ORAL NIGHTLY PRN
Status: DISCONTINUED | OUTPATIENT
Start: 2020-02-25 | End: 2020-02-26 | Stop reason: HOSPADM

## 2020-02-25 RX ORDER — POLYETHYLENE GLYCOL 3350 17 G/17G
17 POWDER, FOR SOLUTION ORAL DAILY PRN
Status: DISCONTINUED | OUTPATIENT
Start: 2020-02-25 | End: 2020-02-26 | Stop reason: HOSPADM

## 2020-02-25 RX ORDER — ACETAMINOPHEN 325 MG/1
650 TABLET ORAL EVERY 4 HOURS PRN
COMMUNITY

## 2020-02-25 RX ORDER — VITAMIN B COMPLEX
1000 TABLET ORAL DAILY
Status: DISCONTINUED | OUTPATIENT
Start: 2020-02-26 | End: 2020-02-26 | Stop reason: HOSPADM

## 2020-02-25 RX ORDER — FAMOTIDINE 20 MG/1
20 TABLET, FILM COATED ORAL DAILY
Status: DISCONTINUED | OUTPATIENT
Start: 2020-02-26 | End: 2020-02-26 | Stop reason: HOSPADM

## 2020-02-25 RX ORDER — CYCLOBENZAPRINE HCL 5 MG
5 TABLET ORAL 3 TIMES DAILY PRN
Status: ON HOLD | COMMUNITY
End: 2020-07-07

## 2020-02-25 RX ORDER — SERTRALINE HYDROCHLORIDE 100 MG/1
100 TABLET, FILM COATED ORAL DAILY
Status: DISCONTINUED | OUTPATIENT
Start: 2020-02-26 | End: 2020-02-26 | Stop reason: HOSPADM

## 2020-02-25 RX ORDER — SODIUM CHLORIDE 0.9 % (FLUSH) 0.9 %
10 SYRINGE (ML) INJECTION EVERY 12 HOURS SCHEDULED
Status: DISCONTINUED | OUTPATIENT
Start: 2020-02-25 | End: 2020-02-26 | Stop reason: HOSPADM

## 2020-02-25 RX ORDER — ACETAMINOPHEN 325 MG/1
650 TABLET ORAL EVERY 6 HOURS PRN
Status: DISCONTINUED | OUTPATIENT
Start: 2020-02-25 | End: 2020-02-26 | Stop reason: HOSPADM

## 2020-02-25 RX ORDER — FOLIC ACID 1 MG/1
1 TABLET ORAL DAILY
Status: DISCONTINUED | OUTPATIENT
Start: 2020-02-25 | End: 2020-02-26 | Stop reason: HOSPADM

## 2020-02-25 RX ORDER — ASPIRIN 81 MG/1
81 TABLET ORAL DAILY
Status: DISCONTINUED | OUTPATIENT
Start: 2020-02-26 | End: 2020-02-26 | Stop reason: HOSPADM

## 2020-02-25 RX ORDER — ONDANSETRON 2 MG/ML
4 INJECTION INTRAMUSCULAR; INTRAVENOUS EVERY 6 HOURS PRN
Status: DISCONTINUED | OUTPATIENT
Start: 2020-02-25 | End: 2020-02-26 | Stop reason: HOSPADM

## 2020-02-25 RX ORDER — PROMETHAZINE HYDROCHLORIDE 25 MG/1
12.5 TABLET ORAL EVERY 6 HOURS PRN
Status: DISCONTINUED | OUTPATIENT
Start: 2020-02-25 | End: 2020-02-26 | Stop reason: HOSPADM

## 2020-02-25 RX ORDER — VERAPAMIL HYDROCHLORIDE 240 MG/1
240 TABLET, FILM COATED, EXTENDED RELEASE ORAL DAILY
COMMUNITY

## 2020-02-25 RX ORDER — SODIUM CHLORIDE 0.9 % (FLUSH) 0.9 %
10 SYRINGE (ML) INJECTION PRN
Status: DISCONTINUED | OUTPATIENT
Start: 2020-02-25 | End: 2020-02-26 | Stop reason: HOSPADM

## 2020-02-25 RX ORDER — METOCLOPRAMIDE HYDROCHLORIDE 5 MG/ML
10 INJECTION INTRAMUSCULAR; INTRAVENOUS ONCE
Status: COMPLETED | OUTPATIENT
Start: 2020-02-25 | End: 2020-02-25

## 2020-02-25 RX ORDER — VERAPAMIL HYDROCHLORIDE 240 MG/1
240 TABLET, FILM COATED, EXTENDED RELEASE ORAL DAILY
Status: DISCONTINUED | OUTPATIENT
Start: 2020-02-26 | End: 2020-02-26 | Stop reason: HOSPADM

## 2020-02-25 RX ADMIN — DOXAZOSIN 8 MG: 4 TABLET ORAL at 21:28

## 2020-02-25 RX ADMIN — Medication 100 MG: at 17:20

## 2020-02-25 RX ADMIN — METOCLOPRAMIDE 10 MG: 5 INJECTION, SOLUTION INTRAMUSCULAR; INTRAVENOUS at 11:01

## 2020-02-25 RX ADMIN — Medication 10 ML: at 21:28

## 2020-02-25 RX ADMIN — CLONIDINE HYDROCHLORIDE 0.3 MG: 0.2 TABLET ORAL at 21:28

## 2020-02-25 RX ADMIN — HEPARIN SODIUM 5000 UNITS: 5000 INJECTION INTRAVENOUS; SUBCUTANEOUS at 21:31

## 2020-02-25 RX ADMIN — NITROGLYCERIN 0.4 MG: 0.4 TABLET, ORALLY DISINTEGRATING SUBLINGUAL at 09:59

## 2020-02-25 RX ADMIN — Medication 10 ML: at 17:21

## 2020-02-25 RX ADMIN — LIDOCAINE HYDROCHLORIDE: 20 SOLUTION ORAL; TOPICAL at 11:01

## 2020-02-25 RX ADMIN — MINOXIDIL 15 MG: 2.5 TABLET ORAL at 21:28

## 2020-02-25 RX ADMIN — ATORVASTATIN CALCIUM 40 MG: 40 TABLET, FILM COATED ORAL at 21:28

## 2020-02-25 RX ADMIN — FOLIC ACID 1 MG: 1 TABLET ORAL at 17:20

## 2020-02-25 RX ADMIN — CLONIDINE HYDROCHLORIDE 0.3 MG: 0.2 TABLET ORAL at 17:20

## 2020-02-25 ASSESSMENT — ENCOUNTER SYMPTOMS
WHEEZING: 0
NAUSEA: 0
ABDOMINAL PAIN: 0
VOMITING: 0
VOICE CHANGE: 0
BACK PAIN: 0
COUGH: 0
DIARRHEA: 0
TROUBLE SWALLOWING: 0
SHORTNESS OF BREATH: 1
CHEST TIGHTNESS: 0
RHINORRHEA: 0
COLOR CHANGE: 0
SORE THROAT: 0

## 2020-02-25 ASSESSMENT — PAIN SCALES - GENERAL
PAINLEVEL_OUTOF10: 0
PAINLEVEL_OUTOF10: 3
PAINLEVEL_OUTOF10: 0
PAINLEVEL_OUTOF10: 0

## 2020-02-25 ASSESSMENT — PAIN DESCRIPTION - LOCATION: LOCATION: CHEST

## 2020-02-25 ASSESSMENT — HEART SCORE: ECG: 1

## 2020-02-25 NOTE — ED NOTES
Spoke with Dr Doug Carrillo for consult, pt will not receive dialysis today.       Nahum Tavarez RN  02/25/20 [id number]

## 2020-02-25 NOTE — H&P
diaphoresis. Associated dizziness and SOB, however patient endorses SOB prior to most HD due to fluid overload. Patient was given nitro and  by dialysis staff and pain improved. Patient was transferred to the ED for further workup. No palpitations, n/v, abd pain. In the ED, patients vitals were T 98.6F, RR 18, , /74, satting 94% on RA. Labs were notable for Cr 9.8, Trop 0.074 to 0.070 on repeat, H&H 9.7/32. 2. Chest Xray showing pulm edema. EKG showing sinus tach. Patient received nitro, reglan, and a GI cocktail. Patient was admitted for further cardiac workup. Past Medical History:          Diagnosis Date    AAA (abdominal aortic aneurysm) (HCC)     Acute on chronic diastolic CHF (congestive heart failure), NYHA class 2 ([facility]     NURIS (acute kidney injury) ([facility] 9/24/2015    Anemia associated with chronic renal failure     Arthritis     stated in hands    Cocaine abuse ([facility] 5/10/2014    Depression     Diabetes mellitus ([facility]     pt states he no longer has diabetes he has lost alot of davion.      Diastolic heart failure secondary to coronary artery disease (HCC)     FSGS (focal segmental glomerulosclerosis) 5/23/2013    Hemodialysis patient ([facility] 10/17/2016    on hemod[facility] 1/16/2012    History of blood transfusion     Hyperlipidemia     Hyperphosphatemia 5/21/2016    Hypertension     Left renal artery stenosis ([facility] 5/22/2014    Monoclonal (M) protein disease, multiple 'M' protein     Nicotine dependence 6/16/2014    Noncompliance     Pneumonia     Psychiatric problem     PTSD (post-traumatic stress disorder)     Pt vet from [place]    Secondary hyperparathyroidism (of renal origin)     Sleep apnea        Past Surgical History:          Procedure Laterality Date    DIALYSIS FISTULA CREATION Left 07/08/2016    EKG 12-LEAD  9/24/2015         HAND TENDON SURGERY Left 4/5/2019    LEFT THUMB FLEXOR TENDON REPAIR performed by Danika Briones MD at [address] VASCULAR SURGERY Left 07/08/2016    AV Fistula    VASCULAR SURGERY Right 1990    Surgery on Achilles tendon       Medications Prior to Admission:      Prior to Admission medications    Medication Sig Start Date End Date Taking?  Authorizing Provider   cloNIDine (CATAPRES) 0.3 MG tablet Take 1 tablet by mouth 3 times daily 1/28/20 3/8/20  Gearold All, APRN - CNP   doxazosin (CARDURA) 8 MG tablet Take 1 tablet by mouth every 12 hours for 28 days 1/28/20 2/25/20  Gearold All, APRN - CNP   minoxidil (LONITEN) 10 MG tablet Take 1.5 tablets by mouth every 12 hours for 28 days 1/28/20 2/25/20  Gearold All, APRN - CNP   thiamine 100 MG tablet Take 1 tablet by mouth daily 1/28/20   Gearold All, APRN - CNP   famotidine (PEPCID) 20 MG tablet Take 1 tablet by mouth daily 1/28/20   Gearold All, APRN - CNP   sertraline (ZOLOFT) 100 MG tablet Take 100 mg by mouth daily    Historical Provider, MD   Sucroferric Oxyhydroxide (VELPHORO) 500 MG CHEW Take 500 mg by mouth 3 times daily (with meals) 1 tablet two times daily with snacks    Historical Provider, MD   folic acid (FOLVITE) 1 MG tablet Take 1 tablet by mouth daily 12/2/19   Javier Guadalupe MD   hydrOXYzine (ATARAX) 50 MG tablet Take 50 mg by mouth 3 times daily as needed for Itching    Historical Provider, MD   vitamin D (CHOLECALCIFEROL) 1000 UNIT TABS tablet Take 2 tablets by mouth 2 times daily 3/23/18   Pascual Collet, MD   aspirin 81 MG EC tablet Take 1 tablet by mouth daily 3/23/18   Pascual Collet, MD   isosorbide mononitrate (IMDUR) 120 MG extended release tablet Take 1 tablet by mouth daily 3/24/18   Pascual Collet, MD   traZODone (DESYREL) 50 MG tablet Take 1 tablet by mouth nightly as needed for Sleep 3/23/18   Pascual Collet, MD   atorvastatin (LIPITOR) 40 MG tablet Take 1 tablet by mouth nightly 3/23/18   Pascual Collet, MD verapamil (CALAN SR) 240 MG extended release tablet Take 1 tablet by mouth daily for 7 days 3/24/18 1/26/20  Joe Patel MD   Multiple Vitamins-Minerals (THERAPEUTIC MULTIVITAMIN-MINERALS) tablet Take 1 tablet by mouth daily    Historical Provider, MD   fluticasone (FLONASE) 50 MCG/ACT nasal spray 1 spray by Nasal route 2 times daily     Historical Provider, MD       Allergies:  Humalog [insulin lispro]; Insulin regular human; and Lisinopril    Social History:      The patient currently lives at home with wife    TOBACCO:   reports that he has been smoking cigarettes. He started smoking about 34 years ago. He has a 5.00 pack-year smoking history. He has never used smokeless tobacco.  ETOH:   reports no history of alcohol use. Family History:      Reviewed in detail and positive as follows:        Problem Relation Age of Onset   Adelene Corydon Cancer Mother     Other Brother         aneurysm        Diet:  DIET CARDIAC; Carb Control: 4 carb choices (60 gms)/meal; Low Sodium (2 GM)  Diet NPO, After Midnight    REVIEW OF SYSTEMS:    Review of Systems - History obtained from chart review and the patient  General ROS: neative  Psychological ROS: positive for - anxiety  Ophthalmic ROS: negative  ENT ROS: negative  Allergy and Immunology ROS: negative  Hematological and Lymphatic ROS: negative  Endocrine ROS: negative  Respiratory ROS: positive for - cough and shortness of breath  Cardiovascular ROS: positive for - chest pain and shortness of breath  Gastrointestinal ROS: no abdominal pain, change in bowel habits, or black or bloody stools  Genito-Urinary ROS: no dysuria, or hematuria  Musculoskeletal ROS: negative  Neurological ROS: no TIA or stroke symptoms  Dermatological ROS: negative    PHYSICAL EXAM:    BP (!) 149/72   Pulse 102   Temp 98.6 °F (37 °C) (Oral)   Resp 20   Wt 233 lb 11 oz (106 kg)   SpO2 94%   BMI 29.21 kg/m²     General appearance:  No apparent distress, appears stated age and cooperative.  Sitting in bed   HEENT:  Normal cephalic, atraumatic without obvious deformity. Pupils equal, round, and reactive to light. Extra ocular muscles intact. Conjunctivae/corneas clear. Neck: Supple, with full range of motion. Trachea midline. Respiratory:  Normal respiratory effort. Clear to auscultation, bilaterally without Rales/Wheezes/Rhonchi. Crackles at the bases   Cardiovascular:  Tachycardic normal S1/S2 without murmurs, rubs or gallops. Abdomen: Soft, non-tender, non-distended with normal bowel sounds. Musculoskeletal:  No clubbing, cyanosis bilaterally. Full range of motion without deformity. 2+ edema bilaterally to the knees   Skin: Skin color, texture, turgor normal.  No rashes or lesions. Neurologic:  Neurovascularly intact without any focal sensory/motor deficits. Cranial nerves: II-XII intact, grossly non-focal.  Psychiatric:  Alert and oriented, thought content appropriate, normal insight  Capillary Refill: Brisk,< 3 seconds   Peripheral Pulses: +2 palpable, equal bilaterally       Labs:     Recent Labs     02/25/20  0850   WBC 6.7   HGB 9.7*   HCT 32.2*        Recent Labs     02/25/20  0850      K 4.9      CO2 23   BUN 51*   CREATININE 9.8*   CALCIUM 9.4     Recent Labs     02/25/20  0850   AST 12   ALT 9*   BILIDIR <0.2   BILITOT 0.3   ALKPHOS 66     No results for input(s): INR in the last 72 hours. No results for input(s): Levonia Citrin in the last 72 hours. Urinalysis:      Lab Results   Component Value Date    NITRU NEGATIVE 04/03/2018    WBCUA 2-4 04/03/2018    BACTERIA NONE 04/03/2018    RBCUA 5-10 04/03/2018    BLOODU SMALL 04/03/2018    SPECGRAV 1.014 03/07/2018    GLUCOSEU NEGATIVE 04/03/2018       Intake & Output:  No intake/output data recorded. No intake/output data recorded.       Radiology:     CXR: I have reviewed the CXR with the following interpretation: pulm edema   EKG:  I have reviewed the EKG with the following interpretation: sinus tach    XR CHEST

## 2020-02-25 NOTE — ED NOTES
Pt given food as requested. Pt updated on plan of care, no questions at this time.       Pan Shepard RN  02/25/20 8307

## 2020-02-25 NOTE — ED PROVIDER NOTES
Appearance: He is well-developed. He is not toxic-appearing or diaphoretic. HENT:      Head: Normocephalic and atraumatic. Right Ear: Hearing normal.      Left Ear: Hearing normal.      Nose: Nose normal. No rhinorrhea. Mouth/Throat:      Pharynx: Uvula midline. No oropharyngeal exudate. Eyes:      General: Lids are normal. No scleral icterus. Conjunctiva/sclera: Conjunctivae normal.      Pupils: Pupils are equal, round, and reactive to light. Neck:      Musculoskeletal: Normal range of motion and neck supple. No neck rigidity. Trachea: No tracheal deviation. Cardiovascular:      Rate and Rhythm: Regular rhythm. Tachycardia present. Heart sounds: Murmur present. Pulmonary:      Effort: Pulmonary effort is normal. No respiratory distress. Breath sounds: Normal breath sounds. No stridor. No decreased breath sounds or wheezing. Abdominal:      General: There is no distension. Palpations: Abdomen is soft. Abdomen is not rigid. Tenderness: There is no abdominal tenderness. There is no guarding. Musculoskeletal: Normal range of motion. Right lower le+ Pitting Edema present. Left lower le+ Pitting Edema present. Lymphadenopathy:      Cervical: No cervical adenopathy. Skin:     General: Skin is warm and dry. Coloration: Skin is not pale. Findings: No rash. Neurological:      Mental Status: He is alert and oriented to person, place, and time. GCS: GCS eye subscore is 4. GCS verbal subscore is 5. GCS motor subscore is 6. Gait: Gait normal.      Comments: No gross deficits observed   Psychiatric:         Speech: Speech normal.         Behavior: Behavior normal.         Thought Content: Thought content normal.     Heart Score for chest pain patients  History:  Moderately Suspicious  ECG: Non-Specifc repolarization disturbance/LBTB/PM  Patient Age: > 39 and < 65 years  *Risk factors for Atherosclerotic disease: Hypercholesterolemia, Hypertension, Cocaine abuse, Diabetes Mellitus  Risk Factors: > 3 Risk factors or history of atherosclerotic disease*  Troponin: > 1 and < 3X normal limit  Heart Score Total: 6      DIFFERENTIAL DIAGNOSIS:   Including but not limited to Fluid overload, CHF, GERD, ACS, MI    DIAGNOSTIC RESULTS     EKG: All EKG's are interpreted by the Emergency Department Physician who either signs or Co-signs this chart in the absence of a cardiologist.    Evelio Cook. Rate: 117 bpm  VT interval: 170 ms  QRS duration: 90 ms  QTc: 477 ms  P-R-T axes: 47, -36, 100  Sinus tachycardia with PAC. Possible left atrial enlargement. Left axis deviation. Left ventricular hypertrophy. No STEMI    Repeat EKG  Vent. Rate: 105 bpm  VT interval: 182 ms  QRS duration: 94 ms  QTc: 489 ms  P-R-T axes: 50, -28, 108  Sinus tachycardia. Possible left atrial enlargement. Left ventricular hypertrophy. T wave abnormality. No STEMI    RADIOLOGY: non-plain film images(s) such as CT, Ultrasound andMRI are read by the radiologist.  Plain radiographic images are visualized and preliminarily interpreted by the emergency physician unless otherwise stated below. XR CHEST PORTABLE   Final Result   Mild pulmonary edema is favored. **This report has been created using voice recognition software. It may contain minor errors which are inherent in voice recognition technology. **      Final report electronically signed by Dr. Vidhi Beaulieu on 2/25/2020 9:47 AM          LABS:   Labs Reviewed   BASIC METABOLIC PANEL W/ REFLEX TO MG FOR LOW K - Abnormal; Notable for the following components:       Result Value    Glucose 114 (*)     BUN 51 (*)     CREATININE 9.8 (*)     All other components within normal limits   CBC WITH AUTO DIFFERENTIAL - Abnormal; Notable for the following components:    RBC 3.12 (*)     Hemoglobin 9.7 (*)     Hematocrit 32.2 (*)     .2 (*)     MCHC 30.1 (*)     RDW-SD 52.4 (*)     Lymphocytes Absolute 0.8 (*)     All other components within normal limits   TROPONIN - Abnormal; Notable for the following components:    Troponin T 0.076 (*)     All other components within normal limits   HEPATIC FUNCTION PANEL - Abnormal; Notable for the following components:    ALT 9 (*)     All other components within normal limits   ANION GAP - Abnormal; Notable for the following components:    Anion Gap 18.0 (*)     All other components within normal limits   GLOMERULAR FILTRATION RATE, ESTIMATED - Abnormal; Notable for the following components:    Est, Glom Filt Rate 7 (*)     All other components within normal limits   TROPONIN - Abnormal; Notable for the following components:    Troponin T 0.070 (*)     All other components within normal limits   OSMOLALITY   TROPONIN       EMERGENCY DEPARTMENT COURSE:   Vitals:    Vitals:    02/25/20 0915 02/25/20 0958 02/25/20 1016 02/25/20 1115   BP:  129/63  (!) 149/72   Pulse:  110 107 102   Resp: 26 21 20   Temp:       TempSrc:       SpO2: 94% 92%  94%   Weight:         0858 Labs ordered    0913 Chest XR ordered    0914 Nitro given    1014 Reglan and GI cocktail given    1035 Consulted Bhavya Zayas NP for admission    The patient was seen by me in the emergency department for chest pain. The patient was at dialysis which was not completed. Physical exam revealed a heart murmur but otherwise was normal.  Vital signs reviewed and noted is stable, although patient was tachycardic. Old records were reviewed. Appropriate labs and imaging studies were ordered and reviewed upon completion. Patient's troponin was elevated at 0.076 which was slightly elevated more than previous. Labs otherwise reflected end-stage renal disease. Chest x-ray revealed mild pulmonary congestion. The patient remained stable in the emergency department. He felt improved after nitroglycerin, Reglan, and GI cocktail. It was difficult to discern at this point whether his chest pain was cardiac in nature or GI related.   It was deemed best to admit this patient. I discussed results and admission with the patient and he was amenable. Yasmin Bond CNP was consulted for the hospitalist service and graciously accepted admission. The patient was admitted to the hospital in fair condition. CRITICAL CARE:   None    CONSULTS:  Bhavya Zayas CNP     PROCEDURES:  None    FINALIMPRESSION      1. Chest pain, unspecified type    2. ESRD (end stage renal disease) on dialysis ([facility]    3. Elevated troponin          DISPOSITION/PLAN   Admission       (Please note that portions of this note were completed with a voice recognition program.  Efforts were made to edit the dictations but occasionally words are mis-transcribed.)    Scribe:by: Paty Iglesias, 2/25/20 9:11 AM Scribing for and in the presence of Sebastien Alexandre PA-C. Provider:  I personally performed the services described in the documentation, reviewed and edited the documentation which was dictated to the scribe in my presence, and it accurately records my words and actions.     Sebastien Alexandre PA-C 2/25/20 1:36 PM        FELICIANO Hyatt, [place]  02/25/20 [id number]

## 2020-02-25 NOTE — ED NOTES
Pt resting in bed with eyes closed, respirations easy and unlabored. Pt denies having a cardiologist. Consult sent to [facility] Dr Max Jones. Awaiting call back. Pt denies chest pain at this time.       Luke Rincon RN  02/25/20 1400

## 2020-02-25 NOTE — PROGRESS NOTES
Pharmacy Medication History Note      List of current medications patient is taking is complete. Source of information: medication list    Changes made to medication list:  Medications removed (include reason, ex. therapy complete or physician discontinued): Folic acid - therapy complete  Multivitamin - therapy complete  Thiamine - therapy complete  Vitamin - dose adjustment    Medications added/doses adjusted:  Acetaminophen 325 mg - take 2 tablets by mouth every 4 hours as needed for pain/fever  Cyclobenzaprine 5 mg - take 1 tablet by mouth three times daily as needed for muscle spasms  Dialyvite 800-Zinc - take 1 tablet by mouth once daily  Fluticasone nasal spray - instill 1 spray once daily  Vitamin d 1000 units - take 1 tablet by mouth once daily    Other notes (ex. Recent course of antibiotics, Coumadin dosing):  Denies use of other OTC or herbal medications.       Allergies reviewed      Electronically signed by Melissa Arellano, [address] [place] [address] on 2/25/2020 at 3:50 PM

## 2020-02-25 NOTE — ED NOTES
Page sent for consult to Dr Debora Yeboah. Awaiting for reply.         Janeth Sheehan, RN  02/25/20 8018

## 2020-02-25 NOTE — PROGRESS NOTES
Pt admitted to  [facility] from ED. Complaints: Chest pain / discomfort. IV site free of s/s of infection or infiltration. Vital signs obtained. Assessment and data collection initiated. Two nurse skin assessment performed by Anna Peng RN and Liyah RN. Oriented to room. Policies and procedures for [facility] explained. Anna Peng RN discussed hourly rounding with patient addressing 5 P's. Fall prevention and safety brochure discussed with patient. Bed alarm on. Call light in reach. The best day to schedule a follow up Dr appointment is:  Monday a.m. Explained patients right to have family, representative or physician notified of their admission. Patient has N/A for physician to be notified. Patient has Declined for family/representative to be notified. All questions answered with no further questions at this time.

## 2020-02-25 NOTE — CONSULTS
Nephrology Consult Note  Patient's Name: Marybeth Matthews  4:46 PM  2/25/2020    Nephrologist: Sara Ferguson    Reason for Consult: End stage kidney disease. Hemodialysis management. Requesting Physician:  Cesia Strong MD  PCP: No primary care provider on file. Chief Complaint: Chest pain  Assessment  1. End stage kidney disease on hemodialysis. 2. Hypertension primary  3. Chest pain  4. Elevated troponin level of uncertain significant  5. Macrocytic anemia   6. Diabetes mellitus type 2. Plan    1. I discussed my thoughts with the patient. 2. He understands. 3. Labs reviewed. 4. Troponin level is trending upward after the initial decline. 5. Chest x-ray report reviewed. 6. Medications reviewed. 7. No changes. 8. No urgent need for hemodialysis again today. 9. We will however re-evaluate in the morning. 10. Discussed with the patient and staff. 11. Discussed with dialysis staff. 12. Will follow. History Obtained From: Patient, staff and medical record  History of Present Ilness:    Marybeth Matthews is a 50 y.o. male with history of end stage kidney disease on hemodialysis among other multiple medical entities was admitted through the emergency department after the patient presented there with chest pain rated about 5-6 over 10 in intensity. According to him, it felt more like stomach irritation as he was not able to burp. He had  Abel chicken about 1:00 this morning. Since then he felt like he had  stomach upset with not been able to burp. He went to his routine dialysis this morning. 1 hour into the treatment he developed  chest pain. As a result he was taken of the dialysis machine and brought to the emergency department. Initial troponin level was 0.076. It came down to 0.070. About 3 hours later it went back up to 0.072. Possibility of cardiac etiology for his chest pain was therefore considered. He was admitted for evaluation and management.   I was asked to see him for end stage History   Problem Relation Age of Onset    Cancer Mother     Other Brother         aneurysm         reports that he has been smoking cigarettes. He started smoking about 34 years ago. He has a 5.00 pack-year smoking history. He has never used smokeless tobacco. He reports previous drug use. He reports that he does not drink alcohol. Allergies:  Humalog [insulin lispro];  Insulin regular human; and Lisinopril    Current Medications:    nitroGLYCERIN (NITROSTAT) SL tablet 0.4 mg, Q5 Min PRN  [START ON 2/26/2020] aspirin EC tablet 81 mg, Daily  atorvastatin (LIPITOR) tablet 40 mg, Nightly  cloNIDine (CATAPRES) tablet 0.3 mg, TID  doxazosin (CARDURA) tablet 8 mg, 2 times per day  [START ON 2/26/2020] famotidine (PEPCID) tablet 20 mg, Daily  folic acid (FOLVITE) tablet 1 mg, Daily  hydrOXYzine (ATARAX) tablet 50 mg, TID PRN  [START ON 2/26/2020] isosorbide mononitrate (IMDUR) extended release tablet 120 mg, Daily  minoxidil (LONITEN) tablet 15 mg, Q12H  [START ON 2/26/2020] sertraline (ZOLOFT) tablet 100 mg, Daily  Sucroferric Oxyhydroxide CHEW 500 mg, TID WC  vitamin B-1 (THIAMINE) tablet 100 mg, Daily  traZODone (DESYREL) tablet 50 mg, Nightly PRN  [START ON 2/26/2020] verapamil (CALAN SR) extended release tablet 240 mg, Daily  sodium chloride flush 0.9 % injection 10 mL, 2 times per day  sodium chloride flush 0.9 % injection 10 mL, PRN  acetaminophen (TYLENOL) tablet 650 mg, Q6H PRN  acetaminophen (TYLENOL) suppository 650 mg, Q6H PRN  polyethylene glycol (GLYCOLAX) packet 17 g, Daily PRN  promethazine (PHENERGAN) tablet 12.5 mg, Q6H PRN  ondansetron (ZOFRAN) injection 4 mg, Q6H PRN  heparin (porcine) injection 5,000 Units, 3 times per day  [START ON 2/26/2020] vitamin D (CHOLECALCIFEROL) tablet 1,000 Units, Daily    HYDROmorphone (DILAUDID) injection 1 mg, Once  ioversol (OPTIRAY) 51 % injection 100 mL, ONCE PRN  lidocaine (LMX) 4 % cream, Once  midazolam (VERSED) injection 1 mg, Once        Review of Systems: Pertinent positives stated above in HPI. All other systems were reviewed and were negative. ROS:Constitutional: negative  Eyes: negative  Ears, nose, mouth, throat, and face: negative  Respiratory: negative  Cardiovascular: positive for chest pain  Gastrointestinal: negative  Genitourinary:negative  Integument/breast: negative  Hematologic/lymphatic: negative  Musculoskeletal:positive for arthralgias and stiff joints  Neurological: negative  Behavioral/Psych: negative  Endocrine: negative  Allergic/Immunologic: negative    Physical exam:   Constitutional: Well-developed middle-aged gentleman in no distress. Vitals:   Vitals:    02/25/20 1515   BP: (!) 154/85   Pulse: 100   Resp: 18   Temp: 98.5 °F (36.9 °C)   SpO2: 92%       Skin: no rash, turgor is normal  Heent: Pupils are reactive to light. Throat is clear. Oral mucosa is moist.  Neck: no bruits or jvd noted   Cardiovascular:  S1, S2 without murmur   Respiratory: Clear with wheezes,rhonchi or rales   Abdomen: soft,non tender,good bowel sound and no palpable mass  Ext: No lower extremity edema  Psychiatric: mood and affect appropriate  Musculoskeletal:  Rom, muscular strength intact  CNS: Very awake and very alert. Well-oriented. Normal speech. Good motor strength. No obvious focal deficit.     Data:   Labs:  Lab Results   Component Value Date     02/25/2020     01/28/2020     01/27/2020    K 4.9 02/25/2020    K 6.5 (HH) 01/28/2020    K 6.2 () 01/27/2020     02/25/2020    CO2 23 02/25/2020    CO2 27 01/28/2020    CO2 28 01/27/2020    CREATININE 9.8 (HH) 02/25/2020    CREATININE 6.7 (HH) 01/28/2020    CREATININE 8.4 (HH) 01/27/2020    BUN 51 (H) 02/25/2020    BUN 33 (H) 01/28/2020    BUN 46 (H) 01/27/2020    GLUCOSE 114 (H) 02/25/2020    GLUCOSE 87 01/28/2020    GLUCOSE 89 01/27/2020    PHOS 5.7 (H) 12/01/2019    PHOS 4.8 (H) 08/05/2018    PHOS 4.6 03/27/2018    WBC 6.7 02/25/2020    WBC 4.7 (L) 01/28/2020    WBC 4.3 (L) 01/27/2020    HGB 9.7 (L) 02/25/2020    HGB 10.3 (L) 01/28/2020    HGB 9.8 (L) 01/27/2020    HCT 32.2 (L) 02/25/2020    HCT 33.7 (L) 01/28/2020    HCT 32.4 (L) 01/27/2020    .2 (H) 02/25/2020     02/25/2020     {Labs reviewed    Imaging:  CXR results: Report reviewed  @ studies        Thank you Dr. Donnelly primary care provider on file.  for allowing us to participate in care of Nancy Fillers       Electronically signed by Nickie Marte MD on 2/25/2020 at 4:46 PM

## 2020-02-26 ENCOUNTER — APPOINTMENT (OUTPATIENT)
Dept: CT IMAGING | Age: 55
End: 2020-02-26
Payer: MEDICARE

## 2020-02-26 VITALS
WEIGHT: 230.38 LBS | SYSTOLIC BLOOD PRESSURE: 168 MMHG | BODY MASS INDEX: 28.65 KG/M2 | DIASTOLIC BLOOD PRESSURE: 77 MMHG | HEIGHT: 75 IN | TEMPERATURE: 98.1 F | RESPIRATION RATE: 16 BRPM | HEART RATE: 105 BPM | OXYGEN SATURATION: 94 %

## 2020-02-26 LAB
ANION GAP SERPL CALCULATED.3IONS-SCNC: 20 MEQ/L (ref 8–16)
AVERAGE GLUCOSE: 90 MG/DL (ref 70–126)
BUN BLDV-MCNC: 71 MG/DL (ref 7–22)
CALCIUM SERPL-MCNC: 9.6 MG/DL (ref 8.5–10.5)
CHLORIDE BLD-SCNC: 101 MEQ/L (ref 98–111)
CO2: 21 MEQ/L (ref 23–33)
CREAT SERPL-MCNC: 11.5 MG/DL (ref 0.4–1.2)
ERYTHROCYTE [DISTWIDTH] IN BLOOD BY AUTOMATED COUNT: 13.7 % (ref 11.5–14.5)
ERYTHROCYTE [DISTWIDTH] IN BLOOD BY AUTOMATED COUNT: 51.4 FL (ref 35–45)
GFR SERPL CREATININE-BSD FRML MDRD: 6 ML/MIN/1.73M2
GLUCOSE BLD-MCNC: 99 MG/DL (ref 70–108)
HBA1C MFR BLD: 5 % (ref 4.4–6.4)
HCT VFR BLD CALC: 33.1 % (ref 42–52)
HEMOGLOBIN: 9.8 GM/DL (ref 14–18)
MCH RBC QN AUTO: 30.7 PG (ref 26–33)
MCHC RBC AUTO-ENTMCNC: 29.6 GM/DL (ref 32.2–35.5)
MCV RBC AUTO: 103.8 FL (ref 80–94)
PLATELET # BLD: 121 THOU/MM3 (ref 130–400)
PMV BLD AUTO: 9.7 FL (ref 9.4–12.4)
POTASSIUM REFLEX MAGNESIUM: 5.5 MEQ/L (ref 3.5–5.2)
RBC # BLD: 3.19 MILL/MM3 (ref 4.7–6.1)
SODIUM BLD-SCNC: 142 MEQ/L (ref 135–145)
WBC # BLD: 4.5 THOU/MM3 (ref 4.8–10.8)

## 2020-02-26 PROCEDURE — G0378 HOSPITAL OBSERVATION PER HR: HCPCS

## 2020-02-26 PROCEDURE — 2580000003 HC RX 258: Performed by: STUDENT IN AN ORGANIZED HEALTH CARE EDUCATION/TRAINING PROGRAM

## 2020-02-26 PROCEDURE — 85027 COMPLETE CBC AUTOMATED: CPT

## 2020-02-26 PROCEDURE — 80048 BASIC METABOLIC PNL TOTAL CA: CPT

## 2020-02-26 PROCEDURE — 6370000000 HC RX 637 (ALT 250 FOR IP): Performed by: INTERNAL MEDICINE

## 2020-02-26 PROCEDURE — 96375 TX/PRO/DX INJ NEW DRUG ADDON: CPT

## 2020-02-26 PROCEDURE — 90935 HEMODIALYSIS ONE EVALUATION: CPT

## 2020-02-26 PROCEDURE — 6360000002 HC RX W HCPCS: Performed by: HOSPITALIST

## 2020-02-26 PROCEDURE — G0257 UNSCHED DIALYSIS ESRD PT HOS: HCPCS

## 2020-02-26 PROCEDURE — 99215 OFFICE O/P EST HI 40 MIN: CPT | Performed by: INTERNAL MEDICINE

## 2020-02-26 PROCEDURE — 96372 THER/PROPH/DIAG INJ SC/IM: CPT

## 2020-02-26 PROCEDURE — 6360000002 HC RX W HCPCS: Performed by: STUDENT IN AN ORGANIZED HEALTH CARE EDUCATION/TRAINING PROGRAM

## 2020-02-26 PROCEDURE — 71275 CT ANGIOGRAPHY CHEST: CPT

## 2020-02-26 PROCEDURE — 36415 COLL VENOUS BLD VENIPUNCTURE: CPT

## 2020-02-26 PROCEDURE — 6370000000 HC RX 637 (ALT 250 FOR IP): Performed by: STUDENT IN AN ORGANIZED HEALTH CARE EDUCATION/TRAINING PROGRAM

## 2020-02-26 PROCEDURE — 6360000004 HC RX CONTRAST MEDICATION: Performed by: HOSPITALIST

## 2020-02-26 PROCEDURE — 83036 HEMOGLOBIN GLYCOSYLATED A1C: CPT

## 2020-02-26 PROCEDURE — 99217 PR OBSERVATION CARE DISCHARGE MANAGEMENT: CPT | Performed by: HOSPITALIST

## 2020-02-26 RX ORDER — HYDRALAZINE HYDROCHLORIDE 20 MG/ML
10 INJECTION INTRAMUSCULAR; INTRAVENOUS ONCE
Status: COMPLETED | OUTPATIENT
Start: 2020-02-26 | End: 2020-02-26

## 2020-02-26 RX ORDER — SEVELAMER CARBONATE 800 MG/1
1600 TABLET, FILM COATED ORAL
Status: DISCONTINUED | OUTPATIENT
Start: 2020-02-26 | End: 2020-02-26 | Stop reason: HOSPADM

## 2020-02-26 RX ORDER — NITROGLYCERIN 0.4 MG/1
TABLET SUBLINGUAL
Qty: 25 TABLET | Refills: 3 | Status: SHIPPED | OUTPATIENT
Start: 2020-02-26

## 2020-02-26 RX ADMIN — IOPAMIDOL 80 ML: 755 INJECTION, SOLUTION INTRAVENOUS at 10:56

## 2020-02-26 RX ADMIN — VITAMIN D, TAB 1000IU (100/BT) 1000 UNITS: 25 TAB at 08:06

## 2020-02-26 RX ADMIN — ISOSORBIDE MONONITRATE 120 MG: 60 TABLET ORAL at 08:07

## 2020-02-26 RX ADMIN — ASPIRIN 81 MG: 81 TABLET ORAL at 08:06

## 2020-02-26 RX ADMIN — Medication 10 ML: at 08:07

## 2020-02-26 RX ADMIN — SEVELAMER CARBONATE 1600 MG: 800 TABLET, FILM COATED ORAL at 11:54

## 2020-02-26 RX ADMIN — DOXAZOSIN 8 MG: 4 TABLET ORAL at 08:07

## 2020-02-26 RX ADMIN — Medication 100 MG: at 08:06

## 2020-02-26 RX ADMIN — VERAPAMIL HYDROCHLORIDE 240 MG: 240 TABLET, FILM COATED, EXTENDED RELEASE ORAL at 08:06

## 2020-02-26 RX ADMIN — CLONIDINE HYDROCHLORIDE 0.3 MG: 0.2 TABLET ORAL at 08:06

## 2020-02-26 RX ADMIN — SERTRALINE 100 MG: 100 TABLET, FILM COATED ORAL at 08:25

## 2020-02-26 RX ADMIN — FOLIC ACID 1 MG: 1 TABLET ORAL at 08:06

## 2020-02-26 RX ADMIN — MINOXIDIL 15 MG: 2.5 TABLET ORAL at 08:07

## 2020-02-26 RX ADMIN — HYDRALAZINE HYDROCHLORIDE 10 MG: 20 INJECTION, SOLUTION INTRAMUSCULAR; INTRAVENOUS at 08:26

## 2020-02-26 RX ADMIN — HEPARIN SODIUM 5000 UNITS: 5000 INJECTION INTRAVENOUS; SUBCUTANEOUS at 06:02

## 2020-02-26 RX ADMIN — FAMOTIDINE 20 MG: 20 TABLET ORAL at 08:06

## 2020-02-26 NOTE — FLOWSHEET NOTE
02/26/20 1247 02/26/20 1729   Vital Signs   BP (!) 156/66 (!) 161/62   Temp 97.5 °F (36.4 °C) 97.8 °F (36.6 °C)   Pulse 98 102   Resp 21 18   SpO2 92 % 96 %   Weight 242 lb 8.1 oz (110 kg) 230 lb 6.1 oz (104.5 kg)   Weight Method Bed scale Bed scale   Percent Weight Change 2.52 -5   Post-Hemodialysis Assessment   Post-Treatment Procedures  --  Blood returned; Access bleeding time < 10 minutes   Machine Disinfection Process  --  Acid/Vinegar Clean;Heat Disinfect; Exterior Machine Disinfection   Rinseback Volume (ml)  --  400 ml   Total Liters Processed (l/min)  --  125.7 l/min   Dialyzer Clearance  --  Lightly streaked   Duration of Treatment (minutes)  --  270 minutes   Heparin amount administered during treatment (units)  --  0 units   Hemodialysis Output (ml)  --  5900 ml   NET Removed (ml)  --  5500 ml   Tolerated Treatment  --  Good   PT completed stable 4.5 HR TX and removed 5.5 Liters of fluid during [address]. PT TX terminated and all blood returned back to PT at end of [address]. Removed 15g needles, X2 and applied clean gauze with light pressure. Prior to PT returning to his room his access dressing is clean, dry and intact. Record printed and then to be scanned into PT's EMR. Report given to primary nurse.

## 2020-02-26 NOTE — PROGRESS NOTES
Discharge teaching and instructions for diagnosis/procedure of chest pain completed with patient using teachback method. AVS reviewed. Printed prescriptions given to patient. Patient voiced understanding regarding prescriptions, follow up appointments, and care of self at home. Discharged ambulatory to  home with support per family. Pt left in stable condition.

## 2020-02-26 NOTE — CONSULTS
dependence 6/16/2014    Noncompliance     Pneumonia     Psychiatric problem     PTSD (post-traumatic stress disorder)     [facility] [place]    Secondary hyperparathyroidism (of renal origin)     Sleep apnea        Past Surgical History:          Procedure Laterality Date    ABDOMINAL AORTIC ANEURYSM REPAIR      DIALYSIS FISTULA CREATION Left 07/08/2016    EKG 12-LEAD  9/24/2015         HAND TENDON SURGERY Left 4/5/2019    LEFT THUMB FLEXOR TENDON REPAIR performed by Sukh Ramsay MD at [address] [facility] VASCULAR SURGERY Left 07/08/2016    AV Fistula    VASCULAR SURGERY Right 1990    Surgery on Achilles tendon       Medications Prior to Admission:      Prior to Admission medications    Medication Sig Start Date End Date Taking?  Authorizing Provider   acetaminophen (TYLENOL) 325 MG tablet Take 650 mg by mouth every 4 hours as needed for Pain or Fever   Yes Historical Provider, MD   B Complex-C-Zn-Folic Acid (DIALYVITE 940-GACC 15 PO) Take 1 tablet by mouth daily   Yes Historical Provider, MD   cyclobenzaprine (FLEXERIL) 5 MG tablet Take 5 mg by mouth 3 times daily as needed for Muscle spasms   Yes Historical Provider, MD   verapamil (CALAN SR) 240 MG extended release tablet Take 240 mg by mouth daily   Yes Historical Provider, MD   vitamin D (CHOLECALCIFEROL) 25 MCG (1000 UT) TABS tablet Take 1,000 Units by mouth daily   Yes Historical Provider, MD   cloNIDine (CATAPRES) 0.3 MG tablet Take 1 tablet by mouth 3 times daily 1/28/20 3/8/20 Yes WADE Pedro CNP   doxazosin (CARDURA) 8 MG tablet Take 1 tablet by mouth every 12 hours for 28 days 1/28/20 2/25/20 Yes WADE Pedro CNP   minoxidil (LONITEN) 10 MG tablet Take 1.5 tablets by mouth every 12 hours for 28 days 1/28/20 2/25/20 Yes WADE Pedro CNP   famotidine (PEPCID) 20 MG tablet Take 1 tablet by mouth daily 1/28/20  Yes WADE Pedro CNP   sertraline (ZOLOFT) 100 MG tablet Take 100 mg by mouth daily   Yes Historical Provider, MD   Sucroferric Oxyhydroxide (VELPHORO) 500 MG CHEW Take 500 mg by mouth 3 times daily (with meals) 1 tablet two times daily with snacks   Yes Historical Provider, MD   hydrOXYzine (ATARAX) 50 MG tablet Take 50 mg by mouth 3 times daily as needed for Itching   Yes Historical Provider, MD   aspirin 81 MG EC tablet Take 1 tablet by mouth daily 3/23/18  Yes Chris Lee MD   isosorbide mononitrate (IMDUR) 120 MG extended release tablet Take 1 tablet by mouth daily 3/24/18  Yes Chris Lee MD   traZODone (DESYREL) 50 MG tablet Take 1 tablet by mouth nightly as needed for Sleep 3/23/18  Yes Chris Lee MD   atorvastatin (LIPITOR) 40 MG tablet Take 1 tablet by mouth nightly 3/23/18  Yes Chris Lee MD   fluticasone (FLONASE) 50 MCG/ACT nasal spray 1 spray by Nasal route daily    Yes Historical Provider, MD       Current Facility-Administered Medications   Medication Dose Route Frequency Provider Last Rate Last Dose    sevelamer (RENVELA) tablet 1,600 mg  1,600 mg Oral TID  Jamal Little MD        nitroGLYCERIN (NITROSTAT) SL tablet 0.4 mg  0.4 mg Sublingual Q5 Min PRN Tang Guerrier PA-C   0.4 mg at 02/25/20 0959    aspirin EC tablet 81 mg  81 mg Oral Daily Hannah Fine MD   81 mg at 02/26/20 0806    atorvastatin (LIPITOR) tablet 40 mg  40 mg Oral Nightly Hannah Fine MD   40 mg at 02/25/20 2128    cloNIDine (CATAPRES) tablet 0.3 mg  0.3 mg Oral TID Hannah Fine MD   0.3 mg at 02/26/20 2119    doxazosin (CARDURA) tablet 8 mg  8 mg Oral 2 times per day Hannah Fine MD   8 mg at 02/26/20 8112    famotidine (PEPCID) tablet 20 mg  20 mg Oral Daily Hannah Fine MD   20 mg at 58/81/22 7975    folic acid (FOLVITE) tablet 1 mg  1 mg Oral Daily Hannah Fine MD   1 mg at 02/26/20 9236    hydrOXYzine (ATARAX) tablet 50 mg  50 mg Oral TID PRN Hannah Fine MD        isosorbide mononitrate (IMDUR) extended release tablet 120 mg  120 mg Oral Daily Kobe Martin, MD   120 mg at 02/26/20 0807    minoxidil (LONITEN) tablet 15 mg  15 mg Oral Q12H Kobe Martin, MD   15 mg at 02/26/20 5849    sertraline (ZOLOFT) tablet 100 mg  100 mg Oral Daily Kobe Martin, MD   100 mg at 02/26/20 0825    vitamin B-1 (THIAMINE) tablet 100 mg  100 mg Oral Daily Kobe Martin MD   100 mg at 02/26/20 4115    traZODone (DESYREL) tablet 50 mg  50 mg Oral Nightly PRN Kobe Martin MD        verapamil [place] SR) extended release tablet 240 mg  240 mg Oral Daily Kobe Martin MD   240 mg at 02/26/20 0806    sodium chloride flush 0.9 % injection 10 mL  10 mL Intravenous 2 times per day Kobe Martin MD   10 mL at 02/26/20 7385    sodium chloride flush 0.9 % injection 10 mL  10 mL Intravenous PRN Kobe Martin MD        acetaminophen (TYLENOL) tablet 650 mg  650 mg Oral Q6H PRN Kobe Martin MD        acetaminophen (TYLENOL) suppository 650 mg  650 mg Rectal Q6H PRN Kobe Martin MD        polyethylene glycol (GLYCOLAX) packet 17 g  17 g Oral Daily PRN Kobe Martin MD        promethazine (PHENERGAN) tablet 12.5 mg  12.5 mg Oral Q6H PRN Kobe Martin MD        [facility]etron [facility]) injection 4 mg  4 mg Intravenous Q6H PRN Kobe Martin MD        heparin (porcine) injection 5,000 Units  5,000 Units Subcutaneous 3 times per day Kobe Martin MD   5,000 Units at 02/26/20 0602    vitamin D (CHOLECALCIFEROL) tablet 1,000 Units  1,000 Units Oral Daily Kobe Martin MD   1,000 Units at 02/26/20 0806     Facility-Administered Medications Ordered in Other Encounters   Medication Dose Route Frequency Provider Last Rate Last Dose    HYDROmorphone (DILAUDID) injection 1 mg  1 mg Intravenous Once Tadeo Alexander MD        ioversol (OPTIRAY) 51 % injection 100 mL  100 mL Intravenous ONCE MELANY Alexander MD        lidocaine (LMX) 4 % cream   Topical Once Atif Cintron Lazaro Hauser MD        midazolam (VERSED) injection 1 mg  1 mg Intravenous Once Anibal Bowie MD           Allergies:  Humalog [insulin lispro]; Insulin regular human; and Lisinopril    Social History:    TOBACCO:   reports that he has been smoking cigarettes. He started smoking about 34 years ago. He has a 5.00 pack-year smoking history. He has never used smokeless tobacco.  ETOH:   reports no history of alcohol use. Family History:        Problem Relation Age of Onset   Abdirizak Lal Cancer Mother     Other Brother         aneurysm          Review of Systems -   General ROS: negative  Psychological ROS: negative  Hematological and Lymphatic ROS: No history of blood clots or bleeding disorder. Respiratory ROS: no cough, shortness of breath, or wheezing  Cardiovascular ROS: As per HPI  Gastrointestinal ROS: negative  Genito-Urinary ROS: no dysuria, trouble voiding, or hematuria  Musculoskeletal ROS: negative  Neurological ROS: no TIA or stroke symptoms  Dermatological ROS: negative    All others reviewed and are negative.        BP (!) 206/82 Comment: Manual  Pulse 100   Temp 98.1 °F (36.7 °C) (Oral)   Resp 18   Ht 6' 3\" (1.905 m)   Wt 236 lb 8.9 oz (107.3 kg)   SpO2 94%   BMI 29.57 kg/m²       Physical Examination:   General appearance - alert, in no distress  Mental status - alert, oriented to person, place, and time  Neck - supple, no significant adenopathy, no JVD, or carotid bruits  Chest - clear to auscultation, no wheezes, rales or rhonchi, symmetric air entry  Heart - normal rate, regular rhythm, normal S1, S2, SM 4/6, rubs, clicks or gallops  Abdomen - soft, nontender, nondistended, no masses or organomegaly  Neurological - alert, oriented, normal speech, no focal findings or movement disorder noted  Musculoskeletal - no joint tenderness, deformity or swelling, AV fistula in left arm  Extremities - peripheral pulses normal, no pedal edema, no clubbing or cyanosis  Skin - normal coloration and turgor, no rashes, no suspicious skin lesions noted      LABS:    Recent Labs     02/25/20  0850 02/25/20  1028 02/25/20  1306   TROPONINT 0.076* 0.070* 0.072*     CBC:   Lab Results   Component Value Date    WBC 4.5 02/26/2020    RBC 3.19 02/26/2020    RBC 5.03 02/24/2012    HGB 9.8 02/26/2020    HCT 33.1 02/26/2020    .8 02/26/2020    MCH 30.7 02/26/2020    MCHC 29.6 02/26/2020    RDW 15.0 04/03/2018     02/26/2020    MPV 9.7 02/26/2020     BMP:    Lab Results   Component Value Date     02/26/2020    K 5.5 02/26/2020     02/26/2020    CO2 21 02/26/2020    BUN 71 02/26/2020    LABALBU 3.7 02/25/2020    CREATININE 11.5 02/26/2020    CALCIUM 9.6 02/26/2020    LABGLOM 6 02/26/2020    GLUCOSE 99 02/26/2020    GLUCOSE 104 02/16/2012     Hepatic Function Panel:    Lab Results   Component Value Date    ALKPHOS 66 02/25/2020    ALT 9 02/25/2020    AST 12 02/25/2020    PROT 7.0 02/25/2020    BILITOT 0.3 02/25/2020    BILIDIR <0.2 02/25/2020    LABALBU 3.7 02/25/2020     Magnesium:    Lab Results   Component Value Date    MG 2.9 01/26/2020     Warfarin PT/INR:  No components found for: PTPATWAR, PTINRWAR  HgBA1c:    Lab Results   Component Value Date    LABA1C 5.0 02/26/2020     FLP:    Lab Results   Component Value Date    TRIG 44 11/09/2016    HDL 78 11/09/2016    LDLCALC 55 11/09/2016     TSH:    Lab Results   Component Value Date    TSH 1.600 04/03/2018     BNP: No components found for: PRO-BNP      Assessment/Plan:    Patient Active Problem List   Diagnosis    FSGS (focal segmental glomerulosclerosis)    Diabetes mellitus type 2, controlled ([facility]    Monoclonal (M) protein disease, multiple 'M' protein    Chronic depression    PTSD (post-traumatic stress disorder)    [facility]    Cocaine use    Substance induced mood disorder ([address]    Renal artery stenosis (HCC) with uncontrollable hypertension    [facility] alcoholism [facility]    Severe cocaine use disorder ([facility]    Essential hypertension    Hypertension    LVH (left ventricular hypertrophy) due to hypertensive disease    Chronic diastolic CHF (congestive heart failure) ([facility]    REZA (obstructive sleep apnea)    FH: HTN (hypertension)    Hypertrophic cardiomyopathy ([address]    Diet-controlled diabetes mellitus ([address]    A-V fistula (HCC)    Chronic thoracic aortic dissection (HCC)    Hyperphosphatemia    Anemia in CKD (chronic kidney disease)    Vitamin D deficiency    Thrombocytopenia (HCC)    Personality disorder (HCC)    Tobacco abuse    Metabolic acidosis    Precordial pain    ESRD on hemodialysis (HCC)    Edema of upper extremity    ESRD (end stage renal disease) ([facility])    Hyperglycemia    Hyperkalemia    Cocaine abuse, continuous - reports spending $100 on cocaine \"every other day\"    Homelessness    Moderate episode of recurrent major depressive disorder ([address]    Noncompliance of patient with renal dialysis ([address]    Anemia associated with stage 5 chronic renal failure ([facility])    Hypertensive emergency    Cocaine use disorder, severe, dependence ([address]    Alcohol use disorder, severe, dependence ([address]    Abdominal aortic aneurysm (AAA) without rupture ([facility])    Atrial flutter with rapid ventricular response ([address]    PVD (peripheral vascular disease) ([address]    Dyslipidemia    Other fluid overload    Accelerated hypertension    Laceration of flexor muscle, fascia and tendon of left thumb at forearm level, initial encounter    Acute respiratory failure with hypoxia ([facility])    Acute pulmonary edema ([facility])    Macrocytic anemia    History of AAA (abdominal aortic aneurysm) repair    Medical non-compliance    Volume overload    Chest pain     Chest pain  Chronically elevated troponins  Ascending aortic anuerysm s/p TEVAR  LVOT obstruction  DM  HTN-uncontrolled  HLD  Non-compliance    Patient reports that his pain resolved with GI cocktail  Wants to be discharged  Had not followed up CTS/Cardiology since TEVAR  Needs evaluation by CT scan  Scheduled for HD  On aspirin, statin, clonidine, imdur, minoxidil  States no prior syncopal episodes  Continue rest of the management  Patient is willing get his HD and leave  Advised to follow up in cardiology clinic within 1-2 weeks           Please do note hesitate to contact me for any further questions. Thank you for the opportunity to be involved in this patient's care.     Code Status: Full Code    Electronically signed by Virgilio Cid MD on 2/26/2020 at 9:38 AM

## 2020-02-27 NOTE — PROGRESS NOTES
hours. Lipids: No results for input(s): CHOL, LDLDIRECT, TRIG, HDL, AMYLASE, LIPASE in the last 72 hours. Liver:   Recent Labs     02/25/20  0850   AST 12   ALT 9*   ALKPHOS 66   PROT 7.0   LABALBU 3.7   BILITOT 0.3     Iron:  No results for input(s): IRONS, FERRITIN in the last 72 hours. Invalid input(s): LABIRONS  CTA CHEST W WO CONTRAST   Final Result      Native thoracic aortic aneurysm is slightly larger than the previous CT. Its lumen likely contains previous hemorrhage. There is an endograft without gross evidence of endoleak. Abdominal aortic aneurysm with dissection is only partially imaged. **This report has been created using voice recognition software. It may contain minor errors which are inherent in voice recognition technology. **      Final report electronically signed by Dr. Madie Mendez on 2/26/2020 12:18 PM      XR CHEST PORTABLE   Final Result   Mild pulmonary edema is favored. **This report has been created using voice recognition software. It may contain minor errors which are inherent in voice recognition technology. **      Final report electronically signed by Dr. Madie Mendez on 2/25/2020 9:47 AM            Objective:   Vitals: BP (!) 168/77   Pulse 105   Temp 98.1 °F (36.7 °C) (Oral)   Resp 16   Ht 6' 3\" (1.905 m)   Wt 230 lb 6.1 oz (104.5 kg)   SpO2 94%   BMI 28.80 kg/m²    Wt Readings from Last 3 Encounters:   02/26/20 230 lb 6.1 oz (104.5 kg)   01/28/20 227 lb 15.3 oz (103.4 kg)   01/21/20 229 lb 0.9 oz (103.9 kg)      24HR INTAKE/OUTPUT:      Intake/Output Summary (Last 24 hours) at 2/26/2020 1923  Last data filed at 2/26/2020 1729  Gross per 24 hour   Intake 960 ml   Output 5900 ml   Net -4940 ml       Constitutional:  Alert, awake, no apparent distress   Skin:normal with no rash or lesions. HEENT:Pupils are reactive . Throat is clear . Oral mucosa is moist   Neck:supple with no thyromegaly or carotid bruit  Cardiovascular:  S1, S2 without murmur or rubs   Respiratory:  Clear to ausculation without wheezes, rhonchi or rales  Abdomen: +bs, soft, no tenderness to palpation and no palpable mass. No abdominal bruit   Ext: No LE edema  Musculoskeletal:Intact  Neuro:Alert and oriented with no focal deficit. Speech is normal      Electronically signed by Carmen Negrete MD on 2/26/2020 at 7:23 PM

## 2020-03-09 ENCOUNTER — HOSPITAL ENCOUNTER (INPATIENT)
Age: 55
LOS: 2 days | Discharge: HOME | DRG: 193 | End: 2020-03-13
Attending: EMERGENCY MEDICINE | Admitting: INTERNAL MEDICINE
Payer: MEDICARE

## 2020-03-09 ENCOUNTER — APPOINTMENT (OUTPATIENT)
Dept: GENERAL RADIOLOGY | Age: 55
DRG: 193 | End: 2020-03-09
Payer: MEDICARE

## 2020-03-09 PROBLEM — D61.818 PANCYTOPENIA (HCC): Status: ACTIVE | Noted: 2020-03-09

## 2020-03-09 PROBLEM — R06.00 DYSPNEA: Status: ACTIVE | Noted: 2020-03-09

## 2020-03-09 LAB
ALBUMIN SERPL-MCNC: 3.6 G/DL (ref 3.5–5.1)
ALP BLD-CCNC: 55 U/L (ref 38–126)
ALT SERPL-CCNC: 8 U/L (ref 11–66)
ANION GAP SERPL CALCULATED.3IONS-SCNC: 14 MEQ/L (ref 8–16)
APTT: 33.9 SECONDS (ref 22–38)
AST SERPL-CCNC: 9 U/L (ref 5–40)
BASOPHILS # BLD: 0.2 %
BASOPHILS ABSOLUTE: 0 THOU/MM3 (ref 0–0.1)
BILIRUB SERPL-MCNC: 0.3 MG/DL (ref 0.3–1.2)
BILIRUBIN DIRECT: < 0.2 MG/DL (ref 0–0.3)
BUN BLDV-MCNC: 74 MG/DL (ref 7–22)
CALCIUM SERPL-MCNC: 8.8 MG/DL (ref 8.5–10.5)
CHLORIDE BLD-SCNC: 102 MEQ/L (ref 98–111)
CO2: 23 MEQ/L (ref 23–33)
CREAT SERPL-MCNC: 13 MG/DL (ref 0.4–1.2)
EKG ATRIAL RATE: 89 BPM
EKG P AXIS: 10 DEGREES
EKG P-R INTERVAL: 200 MS
EKG Q-T INTERVAL: 400 MS
EKG QRS DURATION: 98 MS
EKG QTC CALCULATION (BAZETT): 486 MS
EKG R AXIS: -40 DEGREES
EKG T AXIS: 113 DEGREES
EKG VENTRICULAR RATE: 89 BPM
EOSINOPHIL # BLD: 3 %
EOSINOPHILS ABSOLUTE: 0.1 THOU/MM3 (ref 0–0.4)
ERYTHROCYTE [DISTWIDTH] IN BLOOD BY AUTOMATED COUNT: 14.3 % (ref 11.5–14.5)
ERYTHROCYTE [DISTWIDTH] IN BLOOD BY AUTOMATED COUNT: 53.7 FL (ref 35–45)
FLU A ANTIGEN: NEGATIVE
FLU B ANTIGEN: NEGATIVE
GFR SERPL CREATININE-BSD FRML MDRD: 5 ML/MIN/1.73M2
GLUCOSE BLD-MCNC: 87 MG/DL (ref 70–108)
HCT VFR BLD CALC: 29 % (ref 42–52)
HEMOGLOBIN: 8.8 GM/DL (ref 14–18)
IMMATURE GRANS (ABS): 0.01 THOU/MM3 (ref 0–0.07)
IMMATURE GRANULOCYTES: 0.2 %
INR BLD: 1.03 (ref 0.85–1.13)
LIPASE: 44 U/L (ref 5.6–51.3)
LYMPHOCYTES # BLD: 6.8 %
LYMPHOCYTES ABSOLUTE: 0.3 THOU/MM3 (ref 1–4.8)
MAGNESIUM: 2.7 MG/DL (ref 1.6–2.4)
MCH RBC QN AUTO: 31.1 PG (ref 26–33)
MCHC RBC AUTO-ENTMCNC: 30.3 GM/DL (ref 32.2–35.5)
MCV RBC AUTO: 102.5 FL (ref 80–94)
MONOCYTES # BLD: 13.5 %
MONOCYTES ABSOLUTE: 0.6 THOU/MM3 (ref 0.4–1.3)
NUCLEATED RED BLOOD CELLS: 0 /100 WBC
OSMOLALITY CALCULATION: 298.8 MOSMOL/KG (ref 275–300)
PLATELET # BLD: 107 THOU/MM3 (ref 130–400)
PMV BLD AUTO: 10.1 FL (ref 9.4–12.4)
POTASSIUM SERPL-SCNC: 6.1 MEQ/L (ref 3.5–5.2)
PRO-BNP: ABNORMAL PG/ML (ref 0–900)
RBC # BLD: 2.83 MILL/MM3 (ref 4.7–6.1)
SEG NEUTROPHILS: 76.3 %
SEGMENTED NEUTROPHILS ABSOLUTE COUNT: 3.6 THOU/MM3 (ref 1.8–7.7)
SODIUM BLD-SCNC: 139 MEQ/L (ref 135–145)
TOTAL PROTEIN: 6.4 G/DL (ref 6.1–8)
TROPONIN T: 0.08 NG/ML
WBC # BLD: 4.7 THOU/MM3 (ref 4.8–10.8)

## 2020-03-09 PROCEDURE — G0378 HOSPITAL OBSERVATION PER HR: HCPCS

## 2020-03-09 PROCEDURE — 6370000000 HC RX 637 (ALT 250 FOR IP)

## 2020-03-09 PROCEDURE — 85025 COMPLETE CBC W/AUTO DIFF WBC: CPT

## 2020-03-09 PROCEDURE — 90935 HEMODIALYSIS ONE EVALUATION: CPT | Performed by: INTERNAL MEDICINE

## 2020-03-09 PROCEDURE — 83735 ASSAY OF MAGNESIUM: CPT

## 2020-03-09 PROCEDURE — 99220 PR INITIAL OBSERVATION CARE/DAY 70 MINUTES: CPT | Performed by: NURSE PRACTITIONER

## 2020-03-09 PROCEDURE — 94760 N-INVAS EAR/PLS OXIMETRY 1: CPT

## 2020-03-09 PROCEDURE — 93005 ELECTROCARDIOGRAM TRACING: CPT | Performed by: EMERGENCY MEDICINE

## 2020-03-09 PROCEDURE — 87804 INFLUENZA ASSAY W/OPTIC: CPT

## 2020-03-09 PROCEDURE — 36415 COLL VENOUS BLD VENIPUNCTURE: CPT

## 2020-03-09 PROCEDURE — 99285 EMERGENCY DEPT VISIT HI MDM: CPT

## 2020-03-09 PROCEDURE — 85610 PROTHROMBIN TIME: CPT

## 2020-03-09 PROCEDURE — 5A1D70Z PERFORMANCE OF URINARY FILTRATION, INTERMITTENT, LESS THAN 6 HOURS PER DAY: ICD-10-PCS | Performed by: INTERNAL MEDICINE

## 2020-03-09 PROCEDURE — 90935 HEMODIALYSIS ONE EVALUATION: CPT

## 2020-03-09 PROCEDURE — 6370000000 HC RX 637 (ALT 250 FOR IP): Performed by: NURSE PRACTITIONER

## 2020-03-09 PROCEDURE — 96374 THER/PROPH/DIAG INJ IV PUSH: CPT

## 2020-03-09 PROCEDURE — 2580000003 HC RX 258: Performed by: NURSE PRACTITIONER

## 2020-03-09 PROCEDURE — 80053 COMPREHEN METABOLIC PANEL: CPT

## 2020-03-09 PROCEDURE — 6370000000 HC RX 637 (ALT 250 FOR IP): Performed by: INTERNAL MEDICINE

## 2020-03-09 PROCEDURE — 85730 THROMBOPLASTIN TIME PARTIAL: CPT

## 2020-03-09 PROCEDURE — 83880 ASSAY OF NATRIURETIC PEPTIDE: CPT

## 2020-03-09 PROCEDURE — 93010 ELECTROCARDIOGRAM REPORT: CPT | Performed by: NUCLEAR MEDICINE

## 2020-03-09 PROCEDURE — 82248 BILIRUBIN DIRECT: CPT

## 2020-03-09 PROCEDURE — 71045 X-RAY EXAM CHEST 1 VIEW: CPT

## 2020-03-09 PROCEDURE — 84484 ASSAY OF TROPONIN QUANT: CPT

## 2020-03-09 PROCEDURE — 83690 ASSAY OF LIPASE: CPT

## 2020-03-09 PROCEDURE — 6370000000 HC RX 637 (ALT 250 FOR IP): Performed by: EMERGENCY MEDICINE

## 2020-03-09 PROCEDURE — 99221 1ST HOSP IP/OBS SF/LOW 40: CPT | Performed by: INTERNAL MEDICINE

## 2020-03-09 PROCEDURE — 6360000002 HC RX W HCPCS: Performed by: NURSE PRACTITIONER

## 2020-03-09 RX ORDER — ISOSORBIDE MONONITRATE 60 MG/1
120 TABLET, EXTENDED RELEASE ORAL DAILY
Status: DISCONTINUED | OUTPATIENT
Start: 2020-03-09 | End: 2020-03-13 | Stop reason: HOSPADM

## 2020-03-09 RX ORDER — CODEINE PHOSPHATE AND GUAIFENESIN 10; 100 MG/5ML; MG/5ML
5 SOLUTION ORAL ONCE
Status: COMPLETED | OUTPATIENT
Start: 2020-03-09 | End: 2020-03-09

## 2020-03-09 RX ORDER — PROMETHAZINE HYDROCHLORIDE 25 MG/1
12.5 TABLET ORAL EVERY 6 HOURS PRN
Status: DISCONTINUED | OUTPATIENT
Start: 2020-03-09 | End: 2020-03-13 | Stop reason: HOSPADM

## 2020-03-09 RX ORDER — CYCLOBENZAPRINE HCL 10 MG
5 TABLET ORAL 3 TIMES DAILY PRN
Status: DISCONTINUED | OUTPATIENT
Start: 2020-03-09 | End: 2020-03-13 | Stop reason: HOSPADM

## 2020-03-09 RX ORDER — FAMOTIDINE 20 MG/1
20 TABLET, FILM COATED ORAL DAILY
Status: DISCONTINUED | OUTPATIENT
Start: 2020-03-09 | End: 2020-03-13 | Stop reason: HOSPADM

## 2020-03-09 RX ORDER — VITAMIN B COMPLEX
1000 TABLET ORAL DAILY
Status: DISCONTINUED | OUTPATIENT
Start: 2020-03-09 | End: 2020-03-13 | Stop reason: HOSPADM

## 2020-03-09 RX ORDER — ASPIRIN 81 MG/1
81 TABLET ORAL DAILY
Status: DISCONTINUED | OUTPATIENT
Start: 2020-03-09 | End: 2020-03-13 | Stop reason: HOSPADM

## 2020-03-09 RX ORDER — HYDROXYZINE HYDROCHLORIDE 25 MG/1
50 TABLET, FILM COATED ORAL 3 TIMES DAILY PRN
Status: DISCONTINUED | OUTPATIENT
Start: 2020-03-09 | End: 2020-03-13 | Stop reason: HOSPADM

## 2020-03-09 RX ORDER — ACETAMINOPHEN 325 MG/1
650 TABLET ORAL EVERY 4 HOURS PRN
Status: DISCONTINUED | OUTPATIENT
Start: 2020-03-09 | End: 2020-03-09 | Stop reason: ALTCHOICE

## 2020-03-09 RX ORDER — SERTRALINE HYDROCHLORIDE 100 MG/1
100 TABLET, FILM COATED ORAL DAILY
Status: DISCONTINUED | OUTPATIENT
Start: 2020-03-09 | End: 2020-03-13 | Stop reason: HOSPADM

## 2020-03-09 RX ORDER — TRAZODONE HYDROCHLORIDE 50 MG/1
50 TABLET ORAL NIGHTLY PRN
Status: DISCONTINUED | OUTPATIENT
Start: 2020-03-09 | End: 2020-03-13 | Stop reason: HOSPADM

## 2020-03-09 RX ORDER — DOXAZOSIN MESYLATE 4 MG/1
8 TABLET ORAL EVERY 12 HOURS SCHEDULED
Status: DISCONTINUED | OUTPATIENT
Start: 2020-03-09 | End: 2020-03-13 | Stop reason: HOSPADM

## 2020-03-09 RX ORDER — POLYETHYLENE GLYCOL 3350 17 G/17G
17 POWDER, FOR SOLUTION ORAL DAILY PRN
Status: DISCONTINUED | OUTPATIENT
Start: 2020-03-09 | End: 2020-03-13 | Stop reason: HOSPADM

## 2020-03-09 RX ORDER — HEPARIN SODIUM 5000 [USP'U]/ML
5000 INJECTION, SOLUTION INTRAVENOUS; SUBCUTANEOUS EVERY 8 HOURS SCHEDULED
Status: DISCONTINUED | OUTPATIENT
Start: 2020-03-09 | End: 2020-03-11

## 2020-03-09 RX ORDER — ONDANSETRON 2 MG/ML
4 INJECTION INTRAMUSCULAR; INTRAVENOUS EVERY 6 HOURS PRN
Status: DISCONTINUED | OUTPATIENT
Start: 2020-03-09 | End: 2020-03-13 | Stop reason: HOSPADM

## 2020-03-09 RX ORDER — ATORVASTATIN CALCIUM 40 MG/1
40 TABLET, FILM COATED ORAL NIGHTLY
Status: DISCONTINUED | OUTPATIENT
Start: 2020-03-09 | End: 2020-03-13 | Stop reason: HOSPADM

## 2020-03-09 RX ORDER — SODIUM CHLORIDE 0.9 % (FLUSH) 0.9 %
10 SYRINGE (ML) INJECTION EVERY 12 HOURS SCHEDULED
Status: DISCONTINUED | OUTPATIENT
Start: 2020-03-09 | End: 2020-03-13 | Stop reason: HOSPADM

## 2020-03-09 RX ORDER — FLUTICASONE PROPIONATE 50 MCG
1 SPRAY, SUSPENSION (ML) NASAL DAILY
Status: DISCONTINUED | OUTPATIENT
Start: 2020-03-09 | End: 2020-03-13 | Stop reason: HOSPADM

## 2020-03-09 RX ORDER — MINOXIDIL 2.5 MG/1
15 TABLET ORAL EVERY 12 HOURS
Status: DISCONTINUED | OUTPATIENT
Start: 2020-03-09 | End: 2020-03-09

## 2020-03-09 RX ORDER — BENZONATATE 100 MG/1
200 CAPSULE ORAL 3 TIMES DAILY PRN
Status: DISCONTINUED | OUTPATIENT
Start: 2020-03-09 | End: 2020-03-13 | Stop reason: HOSPADM

## 2020-03-09 RX ORDER — ACETAMINOPHEN 650 MG/1
650 SUPPOSITORY RECTAL EVERY 6 HOURS PRN
Status: DISCONTINUED | OUTPATIENT
Start: 2020-03-09 | End: 2020-03-13 | Stop reason: HOSPADM

## 2020-03-09 RX ORDER — ACETAMINOPHEN 325 MG/1
650 TABLET ORAL EVERY 6 HOURS PRN
Status: DISCONTINUED | OUTPATIENT
Start: 2020-03-09 | End: 2020-03-13 | Stop reason: HOSPADM

## 2020-03-09 RX ORDER — VERAPAMIL HYDROCHLORIDE 240 MG/1
240 TABLET, FILM COATED, EXTENDED RELEASE ORAL DAILY
Status: DISCONTINUED | OUTPATIENT
Start: 2020-03-09 | End: 2020-03-13 | Stop reason: HOSPADM

## 2020-03-09 RX ORDER — SODIUM CHLORIDE 0.9 % (FLUSH) 0.9 %
10 SYRINGE (ML) INJECTION PRN
Status: DISCONTINUED | OUTPATIENT
Start: 2020-03-09 | End: 2020-03-13 | Stop reason: HOSPADM

## 2020-03-09 RX ORDER — MINOXIDIL 2.5 MG/1
20 TABLET ORAL EVERY 12 HOURS
Status: DISCONTINUED | OUTPATIENT
Start: 2020-03-09 | End: 2020-03-13 | Stop reason: HOSPADM

## 2020-03-09 RX ADMIN — Medication 1000 UNITS: at 16:00

## 2020-03-09 RX ADMIN — ISOSORBIDE MONONITRATE 120 MG: 60 TABLET, EXTENDED RELEASE ORAL at 15:57

## 2020-03-09 RX ADMIN — FAMOTIDINE 20 MG: 20 TABLET, FILM COATED ORAL at 15:57

## 2020-03-09 RX ADMIN — Medication 1 SPRAY: at 15:57

## 2020-03-09 RX ADMIN — BENZONATATE 200 MG: 100 CAPSULE ORAL at 15:52

## 2020-03-09 RX ADMIN — DOXAZOSIN MESYLATE 8 MG: 4 TABLET ORAL at 20:23

## 2020-03-09 RX ADMIN — VERAPAMIL HYDROCHLORIDE 240 MG: 240 TABLET, FILM COATED, EXTENDED RELEASE ORAL at 15:58

## 2020-03-09 RX ADMIN — GUAIFENESIN AND CODEINE PHOSPHATE 5 ML: 10; 100 LIQUID ORAL at 04:37

## 2020-03-09 RX ADMIN — PHENOL 1 SPRAY: 1.4 SPRAY ORAL at 15:52

## 2020-03-09 RX ADMIN — SERTRALINE HYDROCHLORIDE 100 MG: 100 TABLET, FILM COATED ORAL at 15:59

## 2020-03-09 RX ADMIN — HEPARIN SODIUM 5000 UNITS: 5000 INJECTION INTRAVENOUS; SUBCUTANEOUS at 20:28

## 2020-03-09 RX ADMIN — ASPIRIN 81 MG: 81 TABLET ORAL at 15:54

## 2020-03-09 RX ADMIN — SODIUM CHLORIDE, PRESERVATIVE FREE 10 ML: 5 INJECTION INTRAVENOUS at 20:25

## 2020-03-09 RX ADMIN — ACETAMINOPHEN 650 MG: 325 TABLET ORAL at 20:24

## 2020-03-09 RX ADMIN — ATORVASTATIN CALCIUM 40 MG: 40 TABLET, FILM COATED ORAL at 20:23

## 2020-03-09 ASSESSMENT — ENCOUNTER SYMPTOMS
BACK PAIN: 0
VOICE CHANGE: 0
VOMITING: 0
ABDOMINAL PAIN: 0
ABDOMINAL DISTENTION: 0
CONSTIPATION: 0
COUGH: 1
SINUS PRESSURE: 0
SHORTNESS OF BREATH: 1
EYE REDNESS: 0
DIARRHEA: 0
EYE ITCHING: 0
WHEEZING: 0
EYE PAIN: 0
TROUBLE SWALLOWING: 0
RHINORRHEA: 1
BLOOD IN STOOL: 0
EYE DISCHARGE: 0
SORE THROAT: 0
CHOKING: 0
CHEST TIGHTNESS: 0
NAUSEA: 0
PHOTOPHOBIA: 0

## 2020-03-09 ASSESSMENT — PAIN SCALES - GENERAL
PAINLEVEL_OUTOF10: 0
PAINLEVEL_OUTOF10: 0

## 2020-03-09 NOTE — ED NOTES
Pt arrives to the ED for a cough that has been occurring for the last 3 days. Pt states he has been unable to sleep due to the cough and has developed chills. Pt states he missed his dialysis appointment on Saturday and was planning on going on Tuesday. Pt states he has been feeling SOB but states that is expected due to missing dialysis. Pt 94% oxygen saturation on room air. However placed on 2 L Nc for comfort. Pt respirations even and unlabored.  Pt vitals stable     [facility], RN  03/09/20 2166 Gume Franco, RN  03/09/20 4377

## 2020-03-09 NOTE — ED PROVIDER NOTES
[facility]  eMERGENCY dEPARTMENT eNCOUnter          CHIEF COMPLAINT       Chief Complaint   Patient presents with    Cough       Nurses Notes reviewed and I agree except as noted in the HPI. HISTORY OF PRESENT ILLNESS    Morena Guadarrama is a 50 y.o. male who presents with cough, mild shortness of breath. The patient does have a history of focal segmental glomerulosclerosis. He is on dialysis he sees Dr. Jacqueline Friedman. He missed dialysis on Saturday. He states he feels like he has a tickle in his throat, he does not have any real shortness of breath. He is able to get up and walk around without any difficulty. There is no conversational dyspnea. Patient denies any fevers at home. However he does have a temperature of 100 here. Patient states that he has a mild cough associated with this that he is not getting anything up. Patient has had some sinus congestion and runny nose. Currently the patient is resting comfortably on the cot no apparent distress. REVIEW OF SYSTEMS     Review of Systems   Constitutional: Negative for activity change, appetite change, diaphoresis, fatigue and unexpected weight change. HENT: Positive for congestion and rhinorrhea. Negative for ear discharge, ear pain, hearing loss, sinus pressure, sore throat, trouble swallowing and voice change. Eyes: Negative for photophobia, pain, discharge, redness and itching. Respiratory: Positive for cough and shortness of breath. Negative for choking, chest tightness and wheezing. Cardiovascular: Negative for chest pain, palpitations and leg swelling. Gastrointestinal: Negative for abdominal distention, abdominal pain, blood in stool, constipation, diarrhea, nausea and vomiting. Endocrine: Negative for polydipsia, polyphagia and polyuria. Genitourinary: Negative for decreased urine volume, difficulty urinating, dysuria, enuresis, frequency, hematuria and urgency.    Musculoskeletal: Negative for arthralgias, back pain, gait problem, myalgias, neck pain and neck stiffness. Skin: Negative for pallor and rash. Allergic/Immunologic: Negative for immunocompromised state. Neurological: Negative for dizziness, tremors, seizures, syncope, facial asymmetry, weakness, light-headedness, numbness and headaches. Hematological: Negative for adenopathy. Does not bruise/bleed easily. Psychiatric/Behavioral: Negative for agitation, hallucinations and suicidal ideas. The patient is not nervous/anxious. PAST MEDICAL HISTORY    has a past medical history of AAA (abdominal aortic aneurysm) (HCC), Acute on chronic diastolic CHF (congestive heart failure), NYHA class 2 ([facility] NURIS (acute kidney injury) ([facility] Anemia associated with chronic renal failure, Arthritis, Cocaine abuse ([facility] Depression, Diabetes mellitus ([facility] Diastolic heart failure secondary to coronary artery disease ([facility] FSGS (focal segmental glomerulosclerosis), Hemodialysis patient ([facility] Hemorrhoids, History of blood transfusion, Hyperlipidemia, Hyperphosphatemia, Hypertension, Left renal artery stenosis ([facility] Monoclonal (M) protein disease, multiple 'M' protein, Nicotine dependence, Noncompliance, Pneumonia, Psychiatric problem, PTSD (post-traumatic stress disorder), Secondary hyperparathyroidism (of renal origin), and Sleep apnea. SURGICAL HISTORY      has a past surgical history that includes Leg Tendon Surgery; EKG 12 Lead (9/24/2015); Dialysis fistula creation (Left, 07/08/2016); vascular surgery (Left, 07/08/2016); vascular surgery (Right, 1990); Hand tendon surgery (Left, 4/5/2019); and Abdominal aortic aneurysm repair.     CURRENT MEDICATIONS       Previous Medications    ACETAMINOPHEN (TYLENOL) 325 MG TABLET    Take 650 mg by mouth every 4 hours as needed for Pain or Fever    ASPIRIN 81 MG EC TABLET    Take 1 tablet by mouth daily    ATORVASTATIN (LIPITOR) 40 MG TABLET    Take 1 tablet by mouth nightly    B COMPLEX-C-ZN-FOLIC ACID ([place] 800-ZINC 15 PO)    Take 1 tablet by mouth daily    CLONIDINE (CATAPRES) 0.3 MG TABLET    Take 1 tablet by mouth 3 times daily    CYCLOBENZAPRINE (FLEXERIL) 5 MG TABLET    Take 5 mg by mouth 3 times daily as needed for Muscle spasms    DOXAZOSIN (CARDURA) 8 MG TABLET    Take 1 tablet by mouth every 12 hours for 28 days    FAMOTIDINE (PEPCID) 20 MG TABLET    Take 1 tablet by mouth daily    FLUTICASONE (FLONASE) 50 MCG/ACT NASAL SPRAY    1 spray by Nasal route daily     HYDROXYZINE (ATARAX) 50 MG TABLET    Take 50 mg by mouth 3 times daily as needed for Itching    ISOSORBIDE MONONITRATE (IMDUR) 120 MG EXTENDED RELEASE TABLET    Take 1 tablet by mouth daily    MINOXIDIL (LONITEN) 10 MG TABLET    Take 1.5 tablets by mouth every 12 hours for 28 days    NITROGLYCERIN (NITROSTAT) 0.4 MG SL TABLET    up to max of 3 total doses. If no relief after 1 dose, call 911. SERTRALINE (ZOLOFT) 100 MG TABLET    Take 100 mg by mouth daily    SUCROFERRIC OXYHYDROXIDE (VELPHORO) 500 MG CHEW    Take 500 mg by mouth 3 times daily (with meals) 1 tablet two times daily with snacks    TRAZODONE (DESYREL) 50 MG TABLET    Take 1 tablet by mouth nightly as needed for Sleep    VERAPAMIL (CALAN SR) 240 MG EXTENDED RELEASE TABLET    Take 240 mg by mouth daily    VITAMIN D (CHOLECALCIFEROL) 25 MCG (1000 UT) TABS TABLET    Take 1,000 Units by mouth daily       ALLERGIES     is allergic to humalog [insulin lispro]; insulin regular human; and lisinopril. FAMILY HISTORY     He indicated that his mother is . He indicated that his father is . He indicated that his brother is . family history includes Cancer in his mother; Other in his brother. SOCIAL HISTORY      reports that he has been smoking cigarettes. He started smoking about 34 years ago. He has a 5.00 pack-year smoking history. He has never used smokeless tobacco. He reports previous drug use. He reports that he does not drink alcohol.     PHYSICAL EXAM     INITIAL fluid overload. At this point I feel the patient needs to be admitted. He is hemodynamically stable at this time. I did call and speak with nephrology and discussed case with them. They will see the patient in the morning and get him dialyzed. I then called the hospitalist service and discussed case with them they graciously accepted the admission. Patient is admitted in stable condition pending further evaluation and treatment    CRITICAL CARE:   None    CONSULTS:  Dr. Alysia Cervantes  Hospitalist    PROCEDURES:  None    FINAL IMPRESSION      1. ESRD on hemodialysis ([facility]    2. Systolic congestive heart failure, unspecified HF chronicity ([facility]    3. Medical non-compliance    4.  Hyperkalemia          DISPOSITION/PLAN   Admission    PATIENT REFERRED TO:  Marry Ramirez MD  [address] [facility]  [phone]            DISCHARGE MEDICATIONS:  New Prescriptions    No medications on file       (Please note that portions of this note were completed with a voice recognition program.  Efforts were made to edit the dictations but occasionally words are mis-transcribed.)    Adelaide Diop, [facility],   03/09/20 4181

## 2020-03-09 NOTE — FLOWSHEET NOTE
03/09/20 0730 03/09/20 1223   Vital Signs   BP (!) 176/68 (!) 177/94   Temp 98 °F (36.7 °C) 98.7 °F (37.1 °C)   Pulse 83 93   Resp 20 21   SpO2 99 % 96 %   Weight 241 lb 6.5 oz (109.5 kg) 232 lb 9.4 oz (105.5 kg)   Weight Method Standing scale Standing scale   Post-Hemodialysis Assessment   Post-Treatment Procedures  --  Blood returned; Access bleeding time < 10 minutes   Machine Disinfection Process  --  Acid/Vinegar Clean;Heat Disinfect; Exterior Machine Disinfection   Total Liters Processed (l/min)  --  117.7 l/min   Dialyzer Clearance  --  Lightly streaked   Duration of Treatment (minutes)  --  270 minutes   Heparin amount administered during treatment (units)  --  0 units   Hemodialysis Intake (ml)  --  400 ml   Hemodialysis Output (ml)  --  5534 ml   NET Removed (ml)  --  5134 ml   Tolerated Treatment  --  Good   Stable 4.5 hour tx. Removed 5.1 L of fluid. Attempted to remove 5.5 L of fluid but pt experienced muscle cramping in last 30 min of tx. Uf goal off with no further c/o noted. pt tolerated  well. Pressure held to each needle site x 10 min. Drsg dry and intact upon leaving unit. TX record printed for scanning into EMR. Report called to primary RN.

## 2020-03-09 NOTE — ED NOTES
Pt. Resting in bed with even and unlabored respirations. Pt. States pain is a 0/10 at this time. Pt provided cough medicine. Pt. Updated about plan of care and treatment. Family at bedside. Pt. Has no further concerns, questions or needs at this time. Call light within reach.         Maria Isabel Mendoza, [place]  03/09/20 0283

## 2020-03-09 NOTE — CONSULTS
[address] [place] [address]                                  CONSULTATION    PATIENT NAME: Claudius Schilder                      :        1965  MED REC NO:   [id number]                           ROOM:  ACCOUNT NO:   [de-identified]                           ADMIT DATE: 2020  PROVIDER:     NEELIMA Granado Chi:  2020    REQUESTING PROVIDER:  Earnestine Garvey DO    REASON FOR CONSULTATION:  Hyperkalemia, end-stage renal disease and  missed dialysis. HISTORY OF PRESENT ILLNESS:  This is a 70-year-old pleasant   American male with history of end-stage renal disease, on dialysis via  left upper extremity AV access; history of chronic fluid overload;  chronic noncompliance with dietary salt and fluid restrictions;  hypertension; history of substance use; history of abdominal aortic  aneurysm, who presented to the hospital's emergency room earlier this  morning for evaluation of shortness of breath associated with some  cough. The patient reports that he became progressively more short of  breath over last two days. He missed his dialysis treatment on  Saturday. He found himself short of breath with minimal exertion. He  felt feverish and had chills at home. He also reports some cough and  sinus congestion. On account of these symptoms, he presented to the  emergency room. He report symptoms are worse with any minimal exertion. No modifying factors. He was evaluated in the emergency room. He was  noted to have blood pressure of 167/87. He was noted to have chest  x-ray findings suggestive of congestive heart failure with pleural  effusions. His hemoglobin was 8.8. His potassium was 6.1 and BNP was  31,769. He was admitted to the Medical Services and Nephrology was  asked to see him in consultation for management of end-stage renal  disease and dialysis.   The patient was seen and examined by me on  dialysis in the dialysis unit. He is currently tolerating dialysis  treatment well. We are removing roughly 5.8 kg of ultrafiltration. He  is tolerating well. His blood flow rate is 450. The patient is well  known to Nephrology Services and has chronic high gains in between  dialysis treatments. His compliance is an ongoing issue. Anyway at  this time, the patient denies any chest pain. He reports improvement in  his symptoms. Roughly 2 kg of ultrafiltration has already been done. The patient is reporting improvement in his symptoms. Denies any chest  pain. No nausea, vomiting or any diarrhea. PAST MEDICAL HISTORY:  Again is significant for end-stage renal disease,  history of hypertension, dyslipidemia, abdominal aortic aneurysm,  history of arthritis, diabetes mellitus, history of substance use. PAST SURGICAL HISTORY:  Significant for previous abdominal aneurysm  repair, history of AV fistula placement, hand tendon surgery, vascular  surgery. FAMILY HISTORY:  Significant for cancer. SOCIAL HISTORY:  Quarter pack per day. Denies any alcohol and denies  any current illicit drug use. HOME MEDICATIONS:  Include nitroglycerin, Flexeril, verapamil, vitamin  D, Pepcid, Velphoro, Atarax, aspirin, Imdur, Desyrel, Lipitor, Flonase,  clonidine, Cardura, minoxidil, Zoloft. REVIEW OF SYSTEM:  Positive for fatigue associated with some subjective  fevers, chills, cough and shortness of breath. The patient denies any  headaches, no blurry vision. No ear pain or any hearing changes. No  sputum production. No hemoptysis. No chest pain. No vomiting or  diarrhea. No hematochezia or melena. No rash. No numbness or any  tingling. All other review of systems were reviewed and negative. ALLERGIES:  The patient is allergic to HUMALOG and LISINOPRIL.     PHYSICAL EXAMINATION:  VITAL SIGNS:  Currently blood pressure is 176/68 with a heart rate of  83, temperature is 98 degrees, respiratory

## 2020-03-10 ENCOUNTER — APPOINTMENT (OUTPATIENT)
Dept: CT IMAGING | Age: 55
DRG: 193 | End: 2020-03-10
Payer: MEDICARE

## 2020-03-10 ENCOUNTER — APPOINTMENT (OUTPATIENT)
Dept: GENERAL RADIOLOGY | Age: 55
DRG: 193 | End: 2020-03-10
Payer: MEDICARE

## 2020-03-10 LAB
ANION GAP SERPL CALCULATED.3IONS-SCNC: 11 MEQ/L (ref 8–16)
BASOPHILS # BLD: 0.6 %
BASOPHILS ABSOLUTE: 0 THOU/MM3 (ref 0–0.1)
BUN BLDV-MCNC: 42 MG/DL (ref 7–22)
CALCIUM SERPL-MCNC: 8.4 MG/DL (ref 8.5–10.5)
CHLORIDE BLD-SCNC: 98 MEQ/L (ref 98–111)
CO2: 23 MEQ/L (ref 23–33)
CREAT SERPL-MCNC: 8.5 MG/DL (ref 0.4–1.2)
EOSINOPHIL # BLD: 2.1 %
EOSINOPHILS ABSOLUTE: 0.1 THOU/MM3 (ref 0–0.4)
ERYTHROCYTE [DISTWIDTH] IN BLOOD BY AUTOMATED COUNT: 14.1 % (ref 11.5–14.5)
ERYTHROCYTE [DISTWIDTH] IN BLOOD BY AUTOMATED COUNT: 53.4 FL (ref 35–45)
GFR SERPL CREATININE-BSD FRML MDRD: 8 ML/MIN/1.73M2
GLUCOSE BLD-MCNC: 93 MG/DL (ref 70–108)
HCT VFR BLD CALC: 30.2 % (ref 42–52)
HEMOGLOBIN: 8.9 GM/DL (ref 14–18)
IMMATURE GRANS (ABS): 0 THOU/MM3 (ref 0–0.07)
IMMATURE GRANULOCYTES: 0 %
LV EF: 60 %
LVEF MODALITY: NORMAL
LYMPHOCYTES # BLD: 9.1 %
LYMPHOCYTES ABSOLUTE: 0.3 THOU/MM3 (ref 1–4.8)
MCH RBC QN AUTO: 30.7 PG (ref 26–33)
MCHC RBC AUTO-ENTMCNC: 29.5 GM/DL (ref 32.2–35.5)
MCV RBC AUTO: 104.1 FL (ref 80–94)
MONOCYTES # BLD: 17.2 %
MONOCYTES ABSOLUTE: 0.6 THOU/MM3 (ref 0.4–1.3)
NUCLEATED RED BLOOD CELLS: 0 /100 WBC
PLATELET # BLD: 110 THOU/MM3 (ref 130–400)
PMV BLD AUTO: 10.5 FL (ref 9.4–12.4)
POTASSIUM REFLEX MAGNESIUM: 5.6 MEQ/L (ref 3.5–5.2)
POTASSIUM SERPL-SCNC: 5.6 MEQ/L (ref 3.5–5.2)
RBC # BLD: 2.9 MILL/MM3 (ref 4.7–6.1)
SEG NEUTROPHILS: 71 %
SEGMENTED NEUTROPHILS ABSOLUTE COUNT: 2.3 THOU/MM3 (ref 1.8–7.7)
SODIUM BLD-SCNC: 132 MEQ/L (ref 135–145)
WBC # BLD: 3.3 THOU/MM3 (ref 4.8–10.8)

## 2020-03-10 PROCEDURE — 90935 HEMODIALYSIS ONE EVALUATION: CPT

## 2020-03-10 PROCEDURE — 6370000000 HC RX 637 (ALT 250 FOR IP): Performed by: PHYSICIAN ASSISTANT

## 2020-03-10 PROCEDURE — 80048 BASIC METABOLIC PNL TOTAL CA: CPT

## 2020-03-10 PROCEDURE — 36415 COLL VENOUS BLD VENIPUNCTURE: CPT

## 2020-03-10 PROCEDURE — 94760 N-INVAS EAR/PLS OXIMETRY 1: CPT

## 2020-03-10 PROCEDURE — G0378 HOSPITAL OBSERVATION PER HR: HCPCS

## 2020-03-10 PROCEDURE — 2580000003 HC RX 258: Performed by: NURSE PRACTITIONER

## 2020-03-10 PROCEDURE — 96376 TX/PRO/DX INJ SAME DRUG ADON: CPT

## 2020-03-10 PROCEDURE — 70450 CT HEAD/BRAIN W/O DYE: CPT

## 2020-03-10 PROCEDURE — 6360000002 HC RX W HCPCS: Performed by: NURSE PRACTITIONER

## 2020-03-10 PROCEDURE — 99233 SBSQ HOSP IP/OBS HIGH 50: CPT | Performed by: INTERNAL MEDICINE

## 2020-03-10 PROCEDURE — 90935 HEMODIALYSIS ONE EVALUATION: CPT | Performed by: NURSE PRACTITIONER

## 2020-03-10 PROCEDURE — 85025 COMPLETE CBC W/AUTO DIFF WBC: CPT

## 2020-03-10 PROCEDURE — 6370000000 HC RX 637 (ALT 250 FOR IP): Performed by: INTERNAL MEDICINE

## 2020-03-10 PROCEDURE — 71046 X-RAY EXAM CHEST 2 VIEWS: CPT

## 2020-03-10 PROCEDURE — 6370000000 HC RX 637 (ALT 250 FOR IP): Performed by: NURSE PRACTITIONER

## 2020-03-10 PROCEDURE — 93306 TTE W/DOPPLER COMPLETE: CPT

## 2020-03-10 RX ORDER — HYDROCODONE POLISTIREX AND CHLORPHENIRAMINE POLISTIREX 10; 8 MG/5ML; MG/5ML
5 SUSPENSION, EXTENDED RELEASE ORAL ONCE
Status: COMPLETED | OUTPATIENT
Start: 2020-03-10 | End: 2020-03-10

## 2020-03-10 RX ORDER — HYDROCODONE POLISTIREX AND CHLORPHENIRAMINE POLISTIREX 10; 8 MG/5ML; MG/5ML
SUSPENSION, EXTENDED RELEASE ORAL
Status: DISPENSED
Start: 2020-03-10 | End: 2020-03-10

## 2020-03-10 RX ORDER — HYDROCODONE POLISTIREX AND CHLORPHENIRAMINE POLISTIREX 10; 8 MG/5ML; MG/5ML
5 SUSPENSION, EXTENDED RELEASE ORAL EVERY 12 HOURS PRN
Status: COMPLETED | OUTPATIENT
Start: 2020-03-10 | End: 2020-03-12

## 2020-03-10 RX ADMIN — VERAPAMIL HYDROCHLORIDE 240 MG: 240 TABLET, FILM COATED, EXTENDED RELEASE ORAL at 12:40

## 2020-03-10 RX ADMIN — HYDROXYZINE HYDROCHLORIDE 50 MG: 25 TABLET, FILM COATED ORAL at 00:04

## 2020-03-10 RX ADMIN — Medication 1 SPRAY: at 12:40

## 2020-03-10 RX ADMIN — BENZONATATE 200 MG: 100 CAPSULE ORAL at 02:43

## 2020-03-10 RX ADMIN — MINOXIDIL 20 MG: 2.5 TABLET ORAL at 12:40

## 2020-03-10 RX ADMIN — TRAZODONE HYDROCHLORIDE 50 MG: 50 TABLET ORAL at 00:05

## 2020-03-10 RX ADMIN — ASPIRIN 81 MG: 81 TABLET ORAL at 12:34

## 2020-03-10 RX ADMIN — DOXAZOSIN MESYLATE 8 MG: 4 TABLET ORAL at 21:11

## 2020-03-10 RX ADMIN — ATORVASTATIN CALCIUM 40 MG: 40 TABLET, FILM COATED ORAL at 21:10

## 2020-03-10 RX ADMIN — ISOSORBIDE MONONITRATE 120 MG: 60 TABLET, EXTENDED RELEASE ORAL at 12:40

## 2020-03-10 RX ADMIN — DOXAZOSIN MESYLATE 8 MG: 4 TABLET ORAL at 12:40

## 2020-03-10 RX ADMIN — HEPARIN SODIUM 5000 UNITS: 5000 INJECTION INTRAVENOUS; SUBCUTANEOUS at 21:12

## 2020-03-10 RX ADMIN — TRAZODONE HYDROCHLORIDE 50 MG: 50 TABLET ORAL at 21:20

## 2020-03-10 RX ADMIN — Medication 1000 UNITS: at 12:40

## 2020-03-10 RX ADMIN — SERTRALINE HYDROCHLORIDE 100 MG: 100 TABLET, FILM COATED ORAL at 12:40

## 2020-03-10 RX ADMIN — Medication 5 ML: at 19:55

## 2020-03-10 RX ADMIN — MINOXIDIL 20 MG: 2.5 TABLET ORAL at 00:06

## 2020-03-10 RX ADMIN — HEPARIN SODIUM 5000 UNITS: 5000 INJECTION INTRAVENOUS; SUBCUTANEOUS at 05:54

## 2020-03-10 RX ADMIN — BENZONATATE 200 MG: 100 CAPSULE ORAL at 12:39

## 2020-03-10 RX ADMIN — MINOXIDIL 20 MG: 2.5 TABLET ORAL at 21:11

## 2020-03-10 RX ADMIN — SODIUM CHLORIDE, PRESERVATIVE FREE 10 ML: 5 INJECTION INTRAVENOUS at 21:12

## 2020-03-10 RX ADMIN — Medication 5 ML: at 03:20

## 2020-03-10 RX ADMIN — FAMOTIDINE 20 MG: 20 TABLET, FILM COATED ORAL at 12:40

## 2020-03-10 RX ADMIN — SODIUM CHLORIDE, PRESERVATIVE FREE 10 ML: 5 INJECTION INTRAVENOUS at 12:40

## 2020-03-10 RX ADMIN — ACETAMINOPHEN 650 MG: 325 TABLET ORAL at 02:56

## 2020-03-10 RX ADMIN — ACETAMINOPHEN 650 MG: 325 TABLET ORAL at 19:55

## 2020-03-10 ASSESSMENT — PAIN DESCRIPTION - LOCATION
LOCATION: HEAD
LOCATION: HEAD;ABDOMEN

## 2020-03-10 ASSESSMENT — PAIN DESCRIPTION - DESCRIPTORS: DESCRIPTORS: SHOOTING;POUNDING

## 2020-03-10 ASSESSMENT — PAIN SCALES - GENERAL
PAINLEVEL_OUTOF10: 0
PAINLEVEL_OUTOF10: 7

## 2020-03-10 ASSESSMENT — PAIN DESCRIPTION - PROGRESSION: CLINICAL_PROGRESSION: GRADUALLY WORSENING

## 2020-03-10 ASSESSMENT — PAIN DESCRIPTION - PAIN TYPE
TYPE: ACUTE PAIN
TYPE: ACUTE PAIN

## 2020-03-10 ASSESSMENT — PAIN - FUNCTIONAL ASSESSMENT: PAIN_FUNCTIONAL_ASSESSMENT: ACTIVITIES ARE NOT PREVENTED

## 2020-03-10 ASSESSMENT — PAIN DESCRIPTION - ONSET: ONSET: ON-GOING

## 2020-03-10 ASSESSMENT — PAIN DESCRIPTION - FREQUENCY: FREQUENCY: INTERMITTENT

## 2020-03-10 NOTE — PROGRESS NOTES
Assessment and Plan:        1. Volume overload- dialysis today; next 3.12  2. Cough- causing severe headache-try tussionex; will repeat cxr  3. Cardiac murmur- echo  4. Hx aortic dissection- advised needs to follow up with vascular surgeon- family member in room confirms      CC:  Presented for cough; missed dialysis  HPI: pt with esrd, severe hbp, hx thoracic aortic dissection, came to ER for cough and admitted for fluid overload. Has had 2 runs of dialysis. Looks miserable today. Holding head. Not bringing up anything. ROS (12 point review of systems completed. Pertinent positives noted. Otherwise ROS is negative) :   PMH:  Per HPI  SHX:  Single, smoker  FHX: pos cancer, murder, aneurysm  Allergies: See above    Medications:       HYDROcodone-chlorpheniramine        aspirin  81 mg Oral Daily    atorvastatin  40 mg Oral Nightly    cloNIDine  0.3 mg Oral TID    doxazosin  8 mg Oral 2 times per day    famotidine  20 mg Oral Daily    fluticasone  1 spray Nasal Daily    isosorbide mononitrate  120 mg Oral Daily    sertraline  100 mg Oral Daily    Sucroferric Oxyhydroxide  500 mg Oral TID WC    verapamil  240 mg Oral Daily    Vitamin D  1,000 Units Oral Daily    sodium chloride flush  10 mL Intravenous 2 times per day    heparin (porcine)  5,000 Units Subcutaneous 3 times per day    minoxidil  20 mg Oral Q12H       Vital Signs:   BP (!) 170/80   Pulse 102   Temp 99.9 °F (37.7 °C) (Oral)   Resp 18   Ht 6' 3\" (1.905 m)   Wt 222 lb 14.2 oz (101.1 kg)   SpO2 90%   BMI 27.86 kg/m²      Intake/Output Summary (Last 24 hours) at 3/10/2020 1413  Last data filed at 3/10/2020 1402  Gross per 24 hour   Intake 1080 ml   Output 3800 ml   Net -2720 ml        Physical Examination: General appearance - ill-appearing and chronically ill appearing.   Holding head  Mental status - alert, oriented to person, place, and time  Neck - supple, no significant adenopathy, no JVD, or carotid bruits  Chest - clear to

## 2020-03-10 NOTE — PROGRESS NOTES
the right. Additional stable findings as detailed above    Summary 7/77/94   Systolic function was normal.   Severely increased left ventricle wall thickness.   Ejection fraction is visually estimated at 60-65%.   Left atrial size was normal.   No evidence of thrombus within left atrial appendage.   Aortic valve appears tricuspid.   increase flow through the LVOT with LVOT obstruction and systolic anterior   motion of the mitral valve.   Mild mitral regurgitation is present.   Mild to moderate systolic anterior motion (CASSIE) of anterior leaflet.   Aortic dissection extending from the aortic arch to the descending aorta .     ASSESSMENT:  1. End Stage Renal Disease 2nd to diabetic and hypertensive nephrosclerosis on Hemodialysis, AV fistula. 1500 ml fluid restriction. Repeat Hemodialysis today with Ultrafiltration. [id number] [address] for discharge today following Hemodialysis. Follow up at Out Patient Dialysis unit per out pt schedule with Hemodialysis tomorrow  2. Hyperkalemia, 2nd to ESRD, 2 K bath today. Renal diet. 3. Essential Hypertension with nephrosclerosis, continue current meds,  4. Anemia of chronic disease, HemodialysisOut pt anemia protocol  5. Hyperphosphatemia   6. Secondary hyperparathyroidism of renal origin  7. Hx of AAA dissection    Principal Problem:    Volume overload  Active Problems:    Uncontrolled hypertension    ESRD on hemodialysis (HCC)    Dyspnea    Pancytopenia (HCC)  Resolved Problems:    * No resolved hospital problems.  Corinne Daigle, WADE - CNP 6:53 AM 3/10/2020

## 2020-03-10 NOTE — CARE COORDINATION
DISCHARGE PLANNING EVALUATION: OP/OBSERVATION        3/10/20, 02:28 PM EDT    Chayo Lyon       Admitted from: ED 3/9/2020/ 0328   Location: [address] [place] [address]- Reason for admit: Dyspnea [R06.00]   Admit order signed?: yes    Procedure: 3/9 dialysis  3/10 dialysis    Pertinent Info/Orders/Treatment Plan:  Pt admitted as observation through ED with SOB. Pt missed dialysis treatment on Saturday. BNP 26725.0. Troponin 0.084. Potassium 6.1, 5.6 and 5.6. Taken urgently to dialysis yesterday and again today. Pt is having harsh cough. Tussionex ordered and repeating CXR. PCP: Monica Gupta MD    Patient Goals/Plan/Treatment Preferences: Spoke with pt and wife. They live at home together. Pt goes to the [place] in [address] for PCP. Pt drives, but wife takes him to and from dialysis. Pt is current with [address] with chair time at 0700 on T-R-S. Pt states he would like a Rollator at discharge. Rich Phipps, RN caring for pt made aware and sticky note left for physician. Transportation/Food Security/Housekeeping Addressed:  No issues identified.

## 2020-03-11 PROBLEM — R50.9 ACUTE FEBRILE ILLNESS: Status: ACTIVE | Noted: 2020-03-11

## 2020-03-11 LAB — POTASSIUM SERPL-SCNC: 4.7 MEQ/L (ref 3.5–5.2)

## 2020-03-11 PROCEDURE — 6370000000 HC RX 637 (ALT 250 FOR IP): Performed by: FAMILY MEDICINE

## 2020-03-11 PROCEDURE — 1200000003 HC TELEMETRY R&B

## 2020-03-11 PROCEDURE — 6370000000 HC RX 637 (ALT 250 FOR IP): Performed by: NURSE PRACTITIONER

## 2020-03-11 PROCEDURE — 36415 COLL VENOUS BLD VENIPUNCTURE: CPT

## 2020-03-11 PROCEDURE — 2580000003 HC RX 258: Performed by: FAMILY MEDICINE

## 2020-03-11 PROCEDURE — 99233 SBSQ HOSP IP/OBS HIGH 50: CPT | Performed by: FAMILY MEDICINE

## 2020-03-11 PROCEDURE — 94760 N-INVAS EAR/PLS OXIMETRY 1: CPT

## 2020-03-11 PROCEDURE — 84132 ASSAY OF SERUM POTASSIUM: CPT

## 2020-03-11 PROCEDURE — 2580000003 HC RX 258: Performed by: NURSE PRACTITIONER

## 2020-03-11 PROCEDURE — 94669 MECHANICAL CHEST WALL OSCILL: CPT

## 2020-03-11 PROCEDURE — 6360000002 HC RX W HCPCS: Performed by: FAMILY MEDICINE

## 2020-03-11 PROCEDURE — 99232 SBSQ HOSP IP/OBS MODERATE 35: CPT | Performed by: INTERNAL MEDICINE

## 2020-03-11 RX ORDER — DOXYCYCLINE HYCLATE 100 MG
100 TABLET ORAL EVERY 12 HOURS SCHEDULED
Status: DISCONTINUED | OUTPATIENT
Start: 2020-03-11 | End: 2020-03-13

## 2020-03-11 RX ADMIN — DOXAZOSIN MESYLATE 8 MG: 4 TABLET ORAL at 22:25

## 2020-03-11 RX ADMIN — MINOXIDIL 20 MG: 2.5 TABLET ORAL at 09:44

## 2020-03-11 RX ADMIN — DOXYCYCLINE HYCLATE 100 MG: 100 TABLET, COATED ORAL at 10:05

## 2020-03-11 RX ADMIN — DOXYCYCLINE HYCLATE 100 MG: 100 TABLET, COATED ORAL at 22:24

## 2020-03-11 RX ADMIN — VERAPAMIL HYDROCHLORIDE 240 MG: 240 TABLET, FILM COATED, EXTENDED RELEASE ORAL at 09:46

## 2020-03-11 RX ADMIN — SERTRALINE HYDROCHLORIDE 100 MG: 100 TABLET, FILM COATED ORAL at 09:45

## 2020-03-11 RX ADMIN — CEFTRIAXONE SODIUM 1 G: 1 INJECTION, POWDER, FOR SOLUTION INTRAMUSCULAR; INTRAVENOUS at 18:40

## 2020-03-11 RX ADMIN — FAMOTIDINE 20 MG: 20 TABLET, FILM COATED ORAL at 09:43

## 2020-03-11 RX ADMIN — Medication 1000 UNITS: at 09:47

## 2020-03-11 RX ADMIN — MINOXIDIL 20 MG: 2.5 TABLET ORAL at 22:25

## 2020-03-11 RX ADMIN — ATORVASTATIN CALCIUM 40 MG: 40 TABLET, FILM COATED ORAL at 22:24

## 2020-03-11 RX ADMIN — ISOSORBIDE MONONITRATE 120 MG: 60 TABLET, EXTENDED RELEASE ORAL at 09:44

## 2020-03-11 RX ADMIN — Medication 1 SPRAY: at 09:43

## 2020-03-11 RX ADMIN — SODIUM CHLORIDE, PRESERVATIVE FREE 10 ML: 5 INJECTION INTRAVENOUS at 09:45

## 2020-03-11 RX ADMIN — CYCLOBENZAPRINE 5 MG: 10 TABLET, FILM COATED ORAL at 02:16

## 2020-03-11 RX ADMIN — ACETAMINOPHEN 650 MG: 325 TABLET ORAL at 02:14

## 2020-03-11 RX ADMIN — ACETAMINOPHEN 650 MG: 325 TABLET ORAL at 09:42

## 2020-03-11 RX ADMIN — DOXAZOSIN MESYLATE 8 MG: 4 TABLET ORAL at 09:42

## 2020-03-11 RX ADMIN — ASPIRIN 81 MG: 81 TABLET ORAL at 09:39

## 2020-03-11 RX ADMIN — SODIUM CHLORIDE, PRESERVATIVE FREE 10 ML: 5 INJECTION INTRAVENOUS at 22:25

## 2020-03-11 ASSESSMENT — PAIN DESCRIPTION - ORIENTATION: ORIENTATION: OTHER (COMMENT)

## 2020-03-11 ASSESSMENT — PAIN SCALES - GENERAL
PAINLEVEL_OUTOF10: 0
PAINLEVEL_OUTOF10: 4
PAINLEVEL_OUTOF10: 4
PAINLEVEL_OUTOF10: 7
PAINLEVEL_OUTOF10: 4
PAINLEVEL_OUTOF10: 7

## 2020-03-11 ASSESSMENT — PAIN DESCRIPTION - FREQUENCY: FREQUENCY: CONTINUOUS

## 2020-03-11 ASSESSMENT — PAIN DESCRIPTION - DESCRIPTORS: DESCRIPTORS: DISCOMFORT

## 2020-03-11 ASSESSMENT — PAIN DESCRIPTION - DIRECTION: RADIATING_TOWARDS: LOWER ABDOMEN

## 2020-03-11 ASSESSMENT — PAIN - FUNCTIONAL ASSESSMENT: PAIN_FUNCTIONAL_ASSESSMENT: PREVENTS OR INTERFERES SOME ACTIVE ACTIVITIES AND ADLS

## 2020-03-11 ASSESSMENT — PAIN DESCRIPTION - LOCATION: LOCATION: ABDOMEN;THROAT

## 2020-03-11 ASSESSMENT — PAIN DESCRIPTION - ONSET: ONSET: ON-GOING

## 2020-03-11 ASSESSMENT — PAIN DESCRIPTION - PAIN TYPE: TYPE: ACUTE PAIN

## 2020-03-11 ASSESSMENT — PAIN DESCRIPTION - PROGRESSION: CLINICAL_PROGRESSION: GRADUALLY WORSENING

## 2020-03-11 NOTE — PLAN OF CARE
Problem: Falls - Risk of:  Goal: Will remain free from falls  Description: Will remain free from falls  3/10/2020 2203 by John Madrid RN  Outcome: Ongoing  Note: Assessment & interventions provided throughout shift. Bed locked & in low position, call light in reach, side-rails up x2, non-slip socks on when ambulating, reminded patient to use call light to call for assistance. Bed alarm on.   3/10/2020 1854 by Carol Carr RN  Outcome: Ongoing  Note: Patient uses call light appropriately  Goal: Absence of physical injury  Description: Absence of physical injury  Outcome: Ongoing     Problem: DAILY CARE  Goal: Daily care needs are met  Outcome: Ongoing  Note: Patient able to complete ADLs. Assistance provided as needed. Problem: SAFETY  Goal: Free from accidental physical injury  3/10/2020 2203 by John Madrid RN  Outcome: Ongoing  3/10/2020 1854 by Carol Carr RN  Outcome: Ongoing     Problem: PAIN  Goal: Patient's pain/discomfort is manageable  3/10/2020 2203 by John Madrid RN  Outcome: Ongoing  Note: Ongoing assessment & interventions provided throughout shift. Reminded patient to report any pain, pressure, or shortness of breath to the nurse. Pain medications provided per physician's orders. 3/10/2020 1854 by Carol Carr RN  Outcome: Ongoing  Note: Headache is more tolerable      Problem: SKIN INTEGRITY  Goal: Skin integrity is maintained or improved  Outcome: Ongoing  Note: Ongoing assessment & interventions provided throughout shift. Skin assessments provided. Encouraging/assisting patient to turn as needed. Problem: DISCHARGE BARRIERS  Goal: Patient's continuum of care needs are met  3/10/2020 2203 by John Madrid RN  Outcome: Ongoing  Note: Patient is from home with wife. 3/10/2020 1854 by Carol Carr RN  Outcome: Ongoing  Note: Patient has cough/head ache. Will discharge home with wife.       Problem: Fluid Volume:  Goal: Will show no

## 2020-03-11 NOTE — PROGRESS NOTES
Renal Progress Note    Assessment and Plan:    1. End stage kidney disease on hemodialysis. 2.  Hyperkalemia  3. Hyponatremia  4. Volume overload  5. Normocytic anemia  6.   Pancytopenia  PLAN:  Labs pending this morning  Medications reviewed  Check potassium level today since it was high yesterday  Echocardiogram in view of loud murmur  Hemodialysis tomorrow if not discharged today  Discussed with the staff  Will follow    Patient Active Problem List:     FSGS (focal segmental glomerulosclerosis)     Diabetes mellitus type 2, controlled ([address]     Monoclonal (M) protein disease, multiple 'M' protein     Chronic depression     PTSD (post-traumatic stress disorder)     Secondary renal hyperparathyroidism ([address]     Cocaine use     Substance induced mood disorder ([address]     Renal artery stenosis (HCC) with uncontrollable hypertension     Chronic alcoholism (HCC)     Severe cocaine use disorder (HCC)     Essential hypertension     Uncontrolled hypertension     LVH (left ventricular hypertrophy) due to hypertensive disease     Chronic diastolic CHF (congestive heart failure) (HCC)     REZA (obstructive sleep apnea)     FH: HTN (hypertension)     Hypertrophic cardiomyopathy ([address]     Diet-controlled diabetes mellitus ([address]     A-V fistula (HCC)     Chronic thoracic aortic dissection (HCC)     Hyperphosphatemia     Anemia in CKD (chronic kidney disease)     Vitamin D deficiency     Thrombocytopenia (HCC)     Personality disorder (HCC)     Tobacco abuse     Metabolic acidosis     Precordial pain     ESRD on hemodialysis (HCC)     Edema of upper extremity     ESRD (end stage renal disease) (HCC)     Hyperglycemia     Hyperkalemia     Cocaine abuse, continuous - reports spending $100 on cocaine \"every other day\"     Homelessness     Moderate episode of recurrent major depressive disorder ([address]     Noncompliance of patient with renal dialysis ([address]     Anemia associated with stage 5 chronic renal failure ([address] Hypertensive emergency     Cocaine use disorder, severe, dependence (HCC)     Alcohol use disorder, severe, dependence ([facility]     Abdominal aortic aneurysm (AAA) without rupture (HCC)     Atrial flutter with rapid ventricular response (HCC)     PVD (peripheral vascular disease) ([facility]     Dyslipidemia     Other fluid overload     Accelerated hypertension     Laceration of flexor muscle, fascia and tendon of left thumb at forearm level, initial encounter     Acute respiratory failure with hypoxia (HCC)     Acute pulmonary edema (HCC)     Macrocytic anemia     History of AAA (abdominal aortic aneurysm) repair     Medical non-compliance     Volume overload     Chest pain     Dyspnea     Pancytopenia (HCC)      Subjective:   Admit Date: 3/9/2020    Interval History:   Seen for end stage kidney disease on hemodialysis  Comfortably asleep this morning  Updated by the staff  No issues  Blood pressure is stable      Medications:   Scheduled Meds:   aspirin  81 mg Oral Daily    atorvastatin  40 mg Oral Nightly    cloNIDine  0.3 mg Oral TID    doxazosin  8 mg Oral 2 times per day    famotidine  20 mg Oral Daily    fluticasone  1 spray Nasal Daily    isosorbide mononitrate  120 mg Oral Daily    sertraline  100 mg Oral Daily    Sucroferric Oxyhydroxide  500 mg Oral TID WC    verapamil  240 mg Oral Daily    Vitamin D  1,000 Units Oral Daily    sodium chloride flush  10 mL Intravenous 2 times per day    heparin (porcine)  5,000 Units Subcutaneous 3 times per day    minoxidil  20 mg Oral Q12H     Continuous Infusions:    CBC:   Recent Labs     03/09/20  0355 03/10/20  0424   WBC 4.7* 3.3*   HGB 8.8* 8.9*   * 110*     CMP:    Recent Labs     03/09/20  0355 03/10/20  0424    132*   K 6.1* 5.6*  5.6*    98   CO2 23 23   BUN 74* 42*   CREATININE 13.0* 8.5*   GLUCOSE 87 93   CALCIUM 8.8 8.4*   LABGLOM 5* 8*     Troponin: No results for input(s): TROPONINI in the last 72 hours.   BNP: No results for

## 2020-03-11 NOTE — PROGRESS NOTES
Hospitalist Progress Note      Patient:  Sheila Mehta      Unit/Bed:3B-35/035-A    YOB: 1965    MRN: [id number]       Acct: [de-identified]     PCP: Capri Worthy MD    Date of Admission: 3/9/2020    Assessment/Plan:    1. Acute fluid overload with acute on chronic diastolic CHF -- due to non-compliance with HD --> apprec renal assist for mgmt of fluid removal/mgmt --> s/p HD 3/9, 3/10 -> plan 3/12 (normal T,R,Sat)  -- ??need to have cardio eval for LVOT but is chronic since at least 2018  -- last echo 3/10/2020 = EF 60%, severe LVH, mod systolic anterior motion (CASSIE) of anterior leaflet. moderate LVOT obstruction, mild TR  -- cont verapamil, imdur -- no BB due to hx of cocaine use  2. ESRD on HD -- apprec renal assist - s/p HD 3/9, 3/10-> typical schedule T/R/Sat   3. Hyperkalemia -- 6.1 on admission and urgent dialysis done on admission 3/9 and repeat K improving but still elevated 3/10 and s/p HD again -- K improving and WNL 3/11 - monitor  4. Acute febrile illness -- up to 100.7 3/10 pm -> influenza 3/9 (-) --> likely pneumonia with CXR 3/10/2020 = small effusion right with mild right basilar atelectasis/pneumonia --> given sx of cough with +green sputum, fever, low WBC 3/10 will treat as pneumonia --> started rocephin 3/11, doxy 3/11 to cover gram +, gram(-) and atypicals  -- no blood cx done since admission -> consider checking if fever persists   -- no urine as HD pt and no urinary sx. -- cont monitor closely  -- last MRSA screen 11/2019 (-) -> ? ?repeat if temps don't improve  5.  Pneumonia with elevated temp, sputum changes, cough, leukopenia  -- possibly gram (+) vs atypical vs viral -- sx worse 3/10 and 3/11 ->  CXR 3/10/2020 = small effusion right with mild right basilar atelectasis/pneumonia --> given sx of cough with +green sputum, fever, low WBC 3/10 will treat as pneumonia --> started rocephin 3/11, doxy 3/11 -- acapella 3/11   -- repeat CXR 3/12  -- did not check PCT as unreliable test with ESRD pt's  6. Headache 3/10-- related to coughing - cont tessalon/tussinex prn -- CT head 3/10/2020 (-) acute process -- cont APAP prn  7. Leukopenia -- down to 3.3 on 3/10 - see above - diff WNL -- some chronic component - ?due to hx drug/etoh abuse given macrocytosis --   8. Hyponatremia -- 3/10 - due to ESRD and HD pt - mgmt per renal - repeat 3/12  9. Thrombocytopenia -- down to 107k on 3/9 and 110 on 3/10 -- ?due to hx of ETOH - ? ? Infectious with #3, 4 as plts normal since 2018 (was low in 3/2018 but otherwise normal) -- no signs of bleeding - monitor and ?stop heparin SQ  10. Hypermagnesemia -- chronic - due to ESRD - per renal  11. Chronically elevated troponin -- slightly up from prior on admission 3/9 at 0.084 (prior 0.04-0.07's since 2019) -- no CP 3/11 -> cont med mgmt -  Cont ASA, statin, imdur, verapamil -- no BB due to hx of cocaine use  -- last stress 3/2018 = EF 44%, small sized, mild in intensity, paradoxical myocardial perfusion defect of the inferior wall which improves with stress. Likely attenuation artifact related abnormality. This study was negative for ischemia  12. Uncontrolled essential HTN --continue clonidine 0.3 mg 3 times daily, Imdur 120 mg daily, Cardura 8 mg twice daily, minoxidil 20 mg twice daily, verapamil 240 mg daily --monitor and adjust as needed with fluid removal from HD -- pt frequently non-compliant with meds/tx -- watch for hypotension -- NO BB due to hx cocaine abuse  13. Hx atrial flutter s/p ALEISHA CV 3/2018 -- currently SR - no further episodes documented on his multiple admissions -- cont verapamil - NO OAC due to hx of aneurysms   14. HLD -- cont statin  15.  Hx Thoracic and abdominal aortic aneurysm with dissection s/p thoracoabdominal endograft repair in [facility] 7/2018 -- TEVAR of a [place] Type B chronic dissection with left carotid subclavian bypass 7/2018  -- last CTA chest 2/2020 = Native thoracic aortic aneurysm is slightly larger than the previous CT. Its lumen likely contains previous hemorrhage. There is an endograft without gross evidence of endoleak  16. Chronic macrocytic anemia -- likely due to CKD -- also due to hx of etoh use in past -- stable in 8-9's since admission -- prior 9-11's since 1/2019 -- no overt signs of bleeding but monitor given hx of aneurysm/AAA/TAA and low plts -- cont monitor   -- no recent iron/B12 testing - check B12/folate 3/12 - further iron studies per renal   -- ?aranesp with CKD  17. Depression/PTSD -- mood stable - cont zoloft  18. Secondary hyperparathyroidism -- per renal  19. Noncompliance -- educated again - admitted 4 times since 11/2019 (hx of multiple prior admits to that in 2017 -2018)  20. Tobacco abuse -- counseled on cessation  21. Diverticulosis -- asx - noted per prior CTs  22. Hx alcohol and cocaine abuse -- no UDS/etoh level done - pt denies and has been educated on cessation multiple times  23. GERD -- cont Pepcid  24. REZA not on CPAP  25. Mild TR -- per echo 3/10/2020    DIspo  -- 3/11 --> apprec renal assist for HD -- fluid status improved and BP improved s/p fluid removal and meds (?compliance with meds as not compliant with HD);  C/o cough, low grade temps, worsening cough with +green sputum and 3/10/2020 = small effusion right with mild right basilar atelectasis/pneumonia --> given sx will tx with rocephin, doxy and repeat CXR tomorrow;  Monitor temps, CBC as all counts low. Monitor resp status. HA improving but still overall feels bad. Cont monitor resp status, lytes, CBC, BP and ?home tomorrow if feeling better. Plan for routine HD tomorrow per renal.        Chief Complaint: shortness of breath     Hospital Course: Maribel Chong is a 50 y.o. male with ESRD on HD, uncontrolled HTN, AAA/dissection s/p endograft stent repair, HLD, depression/PTSH, REZA, anemia, secondary hyperparathyroidism, ?hx monoclonal M protein dz, hx cocaine and alcohol abuse, noncompliance, ongoing smoker presenting with sob. He missed HD on Saturday (no ride per pt) thus CXR c/w volume overload, BP elevated, hyperkalemia thus placed in observation and urgent dialysis done 3/9.  -- renal consulted as ESRD on HD -> s/p tx 3/9, 3/10 - routine HD is T/R/Sat  -- increase cough, HA, low grade fevers -> CT head (-) acute process, CXR with 3/10/2020 = small effusion right with mild right basilar atelectasis/pneumonia --> given sx of cough with +green sputum, fever, low WBC 3/10 will treat as pneumonia --> started rocephin 3/11, doxy 3/11 to cover gram +, gram(-) and atypicals    Subjective/HPI:   -- 3/11/2020  --> pt states he is doing a little better this morning. Ever since he states he has to sit up otherwise when he lays back he starts coughing. The cough causes his head to hurt more and feel like \"my eyes are going to pop out of my head\". No focal neuro deficits. Cough medicine did not really help. His shortness of breath however has improved after his dialysis treatments the last 2 days. His chest is sore from coughing. He is currently on room air. He is coughing up thick green sputum. No hemoptysis. Denies abd pain, n/v.  Denies f/c but temp up to 100.7 overnight. Janneth po but poor appetite. Ambulating without difficulty. Last BM -- none since admission. Bp much improved. 3.8 L removed yesterday with HD - total -7.7L since admission. Wt down 16 lbs since admission. PMH, SURGICAL HX, FH, SOCIAL HX reviewed and updated as needed.     Medications:  Reviewed    Infusion Medications   Scheduled Medications    doxycycline hyclate  100 mg Oral 2 times per day    aspirin  81 mg Oral Daily    atorvastatin  40 mg Oral Nightly    cloNIDine  0.3 mg Oral TID    doxazosin  8 mg Oral 2 times per day    famotidine  20 mg Oral Daily    fluticasone  1 spray Nasal Daily    isosorbide mononitrate  120 mg Oral Daily    sertraline  100 mg Oral Daily    Sucroferric Oxyhydroxide  500 mg Oral TID WC    verapamil  240 mg Oral Daily    Vitamin D  1,000 Units Oral Daily    sodium chloride flush  10 mL Intravenous 2 times per day    minoxidil  20 mg Oral Q12H     PRN Meds: HYDROcodone-chlorpheniramine, cyclobenzaprine, hydrOXYzine, traZODone, sodium chloride flush, acetaminophen **OR** acetaminophen, polyethylene glycol, promethazine **OR** ondansetron, benzonatate, phenol      Intake/Output Summary (Last 24 hours) at 3/11/2020 0858  Last data filed at 3/11/2020 0353  Gross per 24 hour   Intake 790 ml   Output 3800 ml   Net -3010 ml       Diet:  DIET RENAL;    Exam:  /60   Pulse 99   Temp 98 °F (36.7 °C) (Oral)   Resp 18   Ht 6' 3\" (1.905 m)   Wt 225 lb 3.2 oz (102.2 kg)   SpO2 92%   BMI 28.15 kg/m²     General appearance: No apparent distress, appears older than stated age and cooperative. HEENT: Pupils equal, round, and reactive to light. Conjunctivae/corneas clear. Neck: Supple, with full range of motion. No thyromegaly. Trachea midline. Respiratory:  Normal respiratory effort. Rhonchi bilateral bases, no wheezing, no accessory muscle use. Cardiovascular: Regular rate and rhythm with normal S1/S2, 3/6 QING throughout, no rubs or gallops. No JVD. Abdomen: Soft, minimal TTP diffusely, non-distended with normal bowel sounds. No rebound or guarding  Musculoskeletal: No clubbing, cyanosis or edema bilaterally. Full range of motion without deformity. No calf tenderness palpation. AV fistula LUE +thrill  Skin: Skin color, texture, turgor normal.  No rashes or lesions. Neurologic:  Neurovascularly intact without any focal sensory/motor deficits.  Cranial nerves: II-XII intact, grossly non-focal.  Psychiatric: Alert and oriented, thought content appropriate, normal insight  Capillary Refill: Brisk,< 3 seconds   Peripheral Pulses: +2 palpable, equal bilaterally       All labs reviewed and interpreted by me:  Labs:   Recent Labs     03/09/20  0355 03/10/20  0424   WBC 4.7* 3.3*   HGB 8.8* 8.9*   HCT 29.0* 30.2*   * 110* Recent Labs     03/09/20  0355 03/10/20  0424 03/11/20  0800    132*  --    K 6.1* 5.6*  5.6* 4.7    98  --    CO2 23 23  --    BUN 74* 42*  --    CREATININE 13.0* 8.5*  --    CALCIUM 8.8 8.4*  --      Recent Labs     03/09/20  0355   AST 9   ALT 8*   BILIDIR <0.2   BILITOT 0.3   ALKPHOS 55     Recent Labs     03/09/20  0355   INR 1.03     Recent Labs     03/09/20  0355   TROPONINT 0.084*       Urinalysis:      Lab Results   Component Value Date    NITRU NEGATIVE 04/03/2018    WBCUA 2-4 04/03/2018    BACTERIA NONE 04/03/2018    RBCUA 5-10 04/03/2018    BLOODU SMALL 04/03/2018    SPECGRAV 1.014 03/07/2018    GLUCOSEU NEGATIVE 04/03/2018       All radiology images and reports reviewed and interpreted by me:  Radiology:  CT Head WO Contrast   Final Result      No acute ischemic infarct, hemorrhage, or mass effect. **This report has been created using voice recognition software. It may contain minor errors which are inherent in voice recognition technology. **      Final report electronically signed by Dr. Francisco Javier Gallagher on 3/10/2020 6:00 PM      XR CHEST STANDARD (2 VW)   Final Result   1. Moderate cardiomegaly. Aneurysmal aortic arch. An endograft is present in the aortic arch and descending thoracic aorta. 2. Minimal subsegmental atelectasis in the lingula. 3. Small effusion right side. Mild right basilar atelectasis/pneumonia. Overall appearance of chest slightly improved from prior. **This report has been created using voice recognition software. It may contain minor errors which are inherent in voice recognition technology. **      Final report electronically signed by Dr. Kindra Apple on 3/10/2020 2:43 PM      XR CHEST PORTABLE   Final Result      Mild pulmonary edema either due to CHF or fluid overload. Stable small bilateral pleural effusions, larger on the right. Additional stable findings as detailed above.       **This report has been created using voice

## 2020-03-11 NOTE — PROGRESS NOTES
1530:  Report called to [place] on 6K.   I also updated John  that he would be transferred to [facility]

## 2020-03-12 ENCOUNTER — APPOINTMENT (OUTPATIENT)
Dept: GENERAL RADIOLOGY | Age: 55
DRG: 193 | End: 2020-03-12
Payer: MEDICARE

## 2020-03-12 LAB
ANION GAP SERPL CALCULATED.3IONS-SCNC: 15 MEQ/L (ref 8–16)
BASOPHILS # BLD: 0.3 %
BASOPHILS ABSOLUTE: 0 THOU/MM3 (ref 0–0.1)
BUN BLDV-MCNC: 51 MG/DL (ref 7–22)
CALCIUM SERPL-MCNC: 8.6 MG/DL (ref 8.5–10.5)
CHLORIDE BLD-SCNC: 95 MEQ/L (ref 98–111)
CO2: 25 MEQ/L (ref 23–33)
CREAT SERPL-MCNC: 9.7 MG/DL (ref 0.4–1.2)
EOSINOPHIL # BLD: 2 %
EOSINOPHILS ABSOLUTE: 0.1 THOU/MM3 (ref 0–0.4)
ERYTHROCYTE [DISTWIDTH] IN BLOOD BY AUTOMATED COUNT: 13.5 % (ref 11.5–14.5)
ERYTHROCYTE [DISTWIDTH] IN BLOOD BY AUTOMATED COUNT: 49.8 FL (ref 35–45)
FERRITIN: 1404 NG/ML (ref 22–322)
FOLATE: 14.8 NG/ML (ref 4.8–24.2)
GFR SERPL CREATININE-BSD FRML MDRD: 7 ML/MIN/1.73M2
GLUCOSE BLD-MCNC: 120 MG/DL (ref 70–108)
HCT VFR BLD CALC: 30 % (ref 42–52)
HEMOGLOBIN: 9.3 GM/DL (ref 14–18)
IMMATURE GRANS (ABS): 0.01 THOU/MM3 (ref 0–0.07)
IMMATURE GRANULOCYTES: 0.3 %
IRON SATURATION: 26 % (ref 20–50)
IRON: 36 UG/DL (ref 65–195)
LYMPHOCYTES # BLD: 17.3 %
LYMPHOCYTES ABSOLUTE: 0.5 THOU/MM3 (ref 1–4.8)
MCH RBC QN AUTO: 31.4 PG (ref 26–33)
MCHC RBC AUTO-ENTMCNC: 31 GM/DL (ref 32.2–35.5)
MCV RBC AUTO: 101.4 FL (ref 80–94)
MONOCYTES # BLD: 13.4 %
MONOCYTES ABSOLUTE: 0.4 THOU/MM3 (ref 0.4–1.3)
NUCLEATED RED BLOOD CELLS: 0 /100 WBC
PLATELET # BLD: 104 THOU/MM3 (ref 130–400)
PMV BLD AUTO: 10.2 FL (ref 9.4–12.4)
POTASSIUM SERPL-SCNC: 5 MEQ/L (ref 3.5–5.2)
RBC # BLD: 2.96 MILL/MM3 (ref 4.7–6.1)
SEG NEUTROPHILS: 66.7 %
SEGMENTED NEUTROPHILS ABSOLUTE COUNT: 2.1 THOU/MM3 (ref 1.8–7.7)
SODIUM BLD-SCNC: 135 MEQ/L (ref 135–145)
TOTAL IRON BINDING CAPACITY: 139 UG/DL (ref 171–450)
VITAMIN B-12: 285 PG/ML (ref 211–911)
WBC # BLD: 3.1 THOU/MM3 (ref 4.8–10.8)

## 2020-03-12 PROCEDURE — 94760 N-INVAS EAR/PLS OXIMETRY 1: CPT

## 2020-03-12 PROCEDURE — 6370000000 HC RX 637 (ALT 250 FOR IP): Performed by: NURSE PRACTITIONER

## 2020-03-12 PROCEDURE — 82746 ASSAY OF FOLIC ACID SERUM: CPT

## 2020-03-12 PROCEDURE — 83540 ASSAY OF IRON: CPT

## 2020-03-12 PROCEDURE — 83550 IRON BINDING TEST: CPT

## 2020-03-12 PROCEDURE — 36415 COLL VENOUS BLD VENIPUNCTURE: CPT

## 2020-03-12 PROCEDURE — 6370000000 HC RX 637 (ALT 250 FOR IP): Performed by: FAMILY MEDICINE

## 2020-03-12 PROCEDURE — 6370000000 HC RX 637 (ALT 250 FOR IP): Performed by: INTERNAL MEDICINE

## 2020-03-12 PROCEDURE — 80048 BASIC METABOLIC PNL TOTAL CA: CPT

## 2020-03-12 PROCEDURE — 87147 CULTURE TYPE IMMUNOLOGIC: CPT

## 2020-03-12 PROCEDURE — 2580000003 HC RX 258: Performed by: NURSE PRACTITIONER

## 2020-03-12 PROCEDURE — 99233 SBSQ HOSP IP/OBS HIGH 50: CPT | Performed by: HOSPITALIST

## 2020-03-12 PROCEDURE — 85025 COMPLETE CBC W/AUTO DIFF WBC: CPT

## 2020-03-12 PROCEDURE — 87801 DETECT AGNT MULT DNA AMPLI: CPT

## 2020-03-12 PROCEDURE — 82607 VITAMIN B-12: CPT

## 2020-03-12 PROCEDURE — 90935 HEMODIALYSIS ONE EVALUATION: CPT

## 2020-03-12 PROCEDURE — 90935 HEMODIALYSIS ONE EVALUATION: CPT | Performed by: NURSE PRACTITIONER

## 2020-03-12 PROCEDURE — 71046 X-RAY EXAM CHEST 2 VIEWS: CPT

## 2020-03-12 PROCEDURE — 6360000002 HC RX W HCPCS: Performed by: HOSPITALIST

## 2020-03-12 PROCEDURE — 82728 ASSAY OF FERRITIN: CPT

## 2020-03-12 PROCEDURE — 2580000003 HC RX 258: Performed by: FAMILY MEDICINE

## 2020-03-12 PROCEDURE — 1200000003 HC TELEMETRY R&B

## 2020-03-12 PROCEDURE — 6360000002 HC RX W HCPCS: Performed by: FAMILY MEDICINE

## 2020-03-12 PROCEDURE — 94669 MECHANICAL CHEST WALL OSCILL: CPT

## 2020-03-12 PROCEDURE — 87040 BLOOD CULTURE FOR BACTERIA: CPT

## 2020-03-12 RX ORDER — CYANOCOBALAMIN 1000 UG/ML
1000 INJECTION INTRAMUSCULAR; SUBCUTANEOUS ONCE
Status: COMPLETED | OUTPATIENT
Start: 2020-03-12 | End: 2020-03-12

## 2020-03-12 RX ADMIN — ASPIRIN 81 MG: 81 TABLET ORAL at 13:08

## 2020-03-12 RX ADMIN — Medication 5 ML: at 16:04

## 2020-03-12 RX ADMIN — DOXAZOSIN MESYLATE 8 MG: 4 TABLET ORAL at 13:12

## 2020-03-12 RX ADMIN — Medication 1000 UNITS: at 13:18

## 2020-03-12 RX ADMIN — CEFTRIAXONE SODIUM 1 G: 1 INJECTION, POWDER, FOR SOLUTION INTRAMUSCULAR; INTRAVENOUS at 18:05

## 2020-03-12 RX ADMIN — TRAZODONE HYDROCHLORIDE 50 MG: 50 TABLET ORAL at 22:53

## 2020-03-12 RX ADMIN — DOXAZOSIN MESYLATE 8 MG: 4 TABLET ORAL at 22:51

## 2020-03-12 RX ADMIN — FAMOTIDINE 20 MG: 20 TABLET, FILM COATED ORAL at 13:13

## 2020-03-12 RX ADMIN — DOXYCYCLINE HYCLATE 100 MG: 100 TABLET, COATED ORAL at 13:18

## 2020-03-12 RX ADMIN — ACETAMINOPHEN 650 MG: 325 TABLET ORAL at 16:04

## 2020-03-12 RX ADMIN — VERAPAMIL HYDROCHLORIDE 240 MG: 240 TABLET, FILM COATED, EXTENDED RELEASE ORAL at 13:17

## 2020-03-12 RX ADMIN — SODIUM CHLORIDE, PRESERVATIVE FREE 10 ML: 5 INJECTION INTRAVENOUS at 22:52

## 2020-03-12 RX ADMIN — Medication 1 SPRAY: at 13:19

## 2020-03-12 RX ADMIN — DOXYCYCLINE HYCLATE 100 MG: 100 TABLET, COATED ORAL at 22:49

## 2020-03-12 RX ADMIN — Medication 5 ML: at 04:31

## 2020-03-12 RX ADMIN — ATORVASTATIN CALCIUM 40 MG: 40 TABLET, FILM COATED ORAL at 22:49

## 2020-03-12 RX ADMIN — MINOXIDIL 20 MG: 2.5 TABLET ORAL at 22:51

## 2020-03-12 RX ADMIN — MINOXIDIL 20 MG: 2.5 TABLET ORAL at 13:15

## 2020-03-12 RX ADMIN — SERTRALINE HYDROCHLORIDE 100 MG: 100 TABLET, FILM COATED ORAL at 13:16

## 2020-03-12 RX ADMIN — CYANOCOBALAMIN 1000 MCG: 1000 INJECTION, SOLUTION INTRAMUSCULAR at 22:59

## 2020-03-12 RX ADMIN — ISOSORBIDE MONONITRATE 120 MG: 60 TABLET, EXTENDED RELEASE ORAL at 13:14

## 2020-03-12 RX ADMIN — SODIUM CHLORIDE, PRESERVATIVE FREE 10 ML: 5 INJECTION INTRAVENOUS at 13:18

## 2020-03-12 ASSESSMENT — PAIN SCALES - GENERAL
PAINLEVEL_OUTOF10: 0
PAINLEVEL_OUTOF10: 0
PAINLEVEL_OUTOF10: 7
PAINLEVEL_OUTOF10: 0
PAINLEVEL_OUTOF10: 0
PAINLEVEL_OUTOF10: 5

## 2020-03-12 NOTE — PROGRESS NOTES
breath sounds. No wheezes. No shortness of breath noted at rest.  ABDOMEN: soft, non tender  NEUROLOGICAL: Patient is alert and oriented to person, place, and time. Recent and remote memory intact. Thought is coherant. SKIN: no rash, No significant bruises on exposed surfaces  MUSCULOSKELETAL: Movement is coordinated. Moves all extremities   EXTREMITIES: Distal lower extremity temp is warm, No lower extremity edema. Upper extremity AV Fistula   PSYCHIATRIC: mood and affect appropriate. Medications:   Med reviewed  Scheduled Meds:   doxycycline hyclate  100 mg Oral 2 times per day    cefTRIAXone (ROCEPHIN) IV  1 g Intravenous Q24H    aspirin  81 mg Oral Daily    atorvastatin  40 mg Oral Nightly    cloNIDine  0.3 mg Oral TID    doxazosin  8 mg Oral 2 times per day    famotidine  20 mg Oral Daily    fluticasone  1 spray Nasal Daily    isosorbide mononitrate  120 mg Oral Daily    sertraline  100 mg Oral Daily    Sucroferric Oxyhydroxide  500 mg Oral TID WC    verapamil  240 mg Oral Daily    Vitamin D  1,000 Units Oral Daily    sodium chloride flush  10 mL Intravenous 2 times per day    minoxidil  20 mg Oral Q12H     Continuous Infusions:  PRN Meds HYDROcodone-chlorpheniramine, cyclobenzaprine, hydrOXYzine, traZODone, sodium chloride flush, acetaminophen **OR** acetaminophen, polyethylene glycol, promethazine **OR** ondansetron, benzonatate, phenol   Labs:   Labs reviewed  Recent Labs     03/10/20  0424 03/11/20  0800 03/12/20  0606   *  --  135   K 5.6*  5.6* 4.7 5.0   CL 98  --  95*   CO2 23  --  25   BUN 42*  --  51*   CREATININE 8.5*  --  9.7*   LABGLOM 8*  --  7*   GLUCOSE 93  --  120*   CALCIUM 8.4*  --  8.6       Recent Labs     03/10/20  0424 03/12/20  0606   WBC 3.3* 3.1*   RBC 2.90* 2.96*   HGB 8.9* 9.3*   HCT 30.2* 30.0*   .1* 101.4*   MCH 30.7 31.4   * 104*        Summary 3/11/2020   Ejection fraction is visually estimated at 60%.    Overall left ventricular function is

## 2020-03-12 NOTE — FLOWSHEET NOTE
Prayer and encouragement      03/12/20 1736   Encounter Summary   Services provided to: Patient   Referral/Consult From: [facility] Family members   Continue Visiting Yes  (3/12)   Complexity of Encounter Low   Length of Encounter 15 minutes   Routine   Type Follow up   Assessment Approachable;Calm   Intervention Prayer;[place];Nurtured hope   Outcome Acceptance;Expressed gratitude; Hopeful;Encouraged

## 2020-03-12 NOTE — FLOWSHEET NOTE
03/12/20 0713 03/12/20 1211   Vital Signs   /65 (!) 167/79   Temp 97.9 °F (36.6 °C) 98.9 °F (37.2 °C)   Pulse 93 98   Resp 17 20   SpO2 92 %  --    Weight 229 lb 4.5 oz (104 kg) 222 lb 10.6 oz (101 kg)   Weight Method Standing scale Standing scale   Percent Weight Change 0.69 -2.88   Post-Hemodialysis Assessment   Post-Treatment Procedures  --  Blood returned; Access bleeding time < 10 minutes   Machine Disinfection Process  --  Acid/Vinegar Clean;Heat Disinfect; Exterior Machine Disinfection   Rinseback Volume (ml)  --  400 ml   Total Liters Processed (l/min)  --  103.3 l/min   Dialyzer Clearance  --  Lightly streaked   Duration of Treatment (minutes)  --  270 minutes   Heparin amount administered during treatment (units)  --  0 units   Hemodialysis Output (ml)  --  3400 ml   NET Removed (ml)  --  4400 ml   Tolerated Treatment  --  Good   4.5 hour treatment completed. 3L of fluid removed. Patient tolerated fluid removal well with minimal issues. Pressure held to access for 10 mins x2. Dressing clean/dry and intact upon leaving the unit. Report called to primary floor nurse. Charting printed and placed in patient paper chart to be scanned into EMR.

## 2020-03-12 NOTE — PROGRESS NOTES
Hospitalist Progress Note    Patient:  Suki Marroquin      Unit/Bed:6K-18/018-A    YOB: 1965    MRN: [id number]       Acct: [de-identified]     PCP: Selene Charles MD    Date of Admission: 3/9/2020    Active Hospital Problems    Diagnosis Date Noted    Depression [F32.9]      Priority: High    Acute febrile illness [R50.9] 03/11/2020    Hyponatremia [E87.1]     Hypermagnesemia [E83.41]     History of atrial flutter [Z86.79]     History of alcohol abuse [F10.11]     History of cocaine abuse ([facility] [F14.11]     Mild tricuspid regurgitation [I07.1]     Dyspnea [R06.00] 03/09/2020    P[facility]topenia ([facility] [D61.818] 03/09/2020    Volume overload [E87.70] 01/26/2020    History of aortic aneurysm repair [X19.917, Z86.79]     Elevated troponin [R79.89]     ESRD on hemodialysis ([facility] [N18.6, Z99.2]     Pneumonia due to organism [J18.9]     Headache, unspecified headache type [R51] 06/02/2016    Acute on chronic diastolic CHF (congestive heart failure) ([facility]) [I50.33]     Uncontrolled hypertension [I10]        Assessment/Plan:    1. Acute fluid overload with acute on chronic diastolic CHF -- due to non-compliance with HD; resolved now. Pt on R/A  2. ESRD on IHD TThS: nephro following  3. Hyperkalemia - d/t missed HD; resolved now. K 5.0 will monitor  4. Acute febrile illness - presumably d/t PNA; on ceftriaxone and Doxy since yesterday which will continue as pt still febrile overnight; will follow up culturs  6. Leukopenia -- stable; WBC 3.1; Vlikely d/t infection; however, Vit B12 level in lowe-NL limit; placed on cyanocobolamine  7. Hyponatremia : resolved with HD, Na 135. will monitor  8. Thrombocytopenia mild, stable, chronic  12. Chronically elevated troponin -CP free, stable; will monitor clinically  13. Hx of suboptimally controlled essential HTN: fairly well-controlled on current regimen, CCM and will monitor  14.  Hx atrial flutter s/p ALEISHA CV 3/2018 : NSR ; NO OAC due to hx of aneurysms 15. HLD -- cont statin  16. Hx Thoracic and abdominal aortic aneurysm with dissection s/p thoracoabdominal endograft repair in [facility] 7/2018 -- TEVAR of a Nghia Type B chronic dissection with left carotid subclavian bypass 7/2018  -- last CTA chest 2/2020 = Native thoracic aortic aneurysm is slightly larger than the previous CT. Its lumen likely contains previous hemorrhage. There is an endograft without gross evidence of endoleak  17. Chronic macrocytic anemia -- stable, started on vit b12; sent iron studies  18. Depression/PTSD -- mood stable - cont zoloft  19. Secondary hyperparathyroidism -- 2/2 ESRD, nephro following  20. Noncompliance: counseling offered  21. Tobacco abuse: counseling and nicotine patch offered. 22. Hx alcohol and cocaine abuse -- no UDS/etoh level done - pt denies and has been educated on cessation multiple times  23. GERD -- cont Pepcid  24. REZA not on CPAP  25. Mild TR -- per echo 3/10/2020         Expected discharge date:  TBD         Disposition:      [] Home                             [] TCU                             [] Rehab                             [] Psych                             [] SNF                             [] [facility]                             [] Other- TBD    Chief Complaint:   Chief Complaint   Patient presents with   Shirlene Mighty Cough       Hospital Course: Patient was seen, examined and the medical chart was reviewed thoroughly today. In summary, 50 y.o.male admitted on 3/11/2020 for medical management of volume overload. Nephro service is also following. Pt was initially planned for discharge which was later canceled d/t acute febrile illness. Pt was transferred from  as required lower level care. I took over care on 3/12. Subjective (past 24 hours):   had IHD today w/o complications; denies CP/SOB/palpitation/chills/abdo pan/diarrhea or urinary symptoms. Reports cough/sputum improved.       Medications:  Reviewed    Infusion Medications radiation dose to as low as reasonably achievable. FINDINGS: Brain: There is no acute ischemic infarct, hemorrhage, midline shift, mass, or mass effect. There is no focal abnormality of brain parenchymal attenuation. Ventricles/basal cisterns: The ventricles and cisterns are of appropriate size and configuration for the patient's age. No evidence of obstructive hydrocephalus. Skull base/calvarium/osseous structures: Unremarkable Soft tissues: Unremarkable Intraorbital contents: Unremarkable Sinuses: Unremarkable; the imaged sinuses are clear without evidence of mucosal thickening or fluid levels. Mastoids: Unremarkable; the mastoid air cells are clear. No acute ischemic infarct, hemorrhage, or mass effect. **This report has been created using voice recognition software. It may contain minor errors which are inherent in voice recognition technology. ** Final report electronically signed by Dr. Jenn Montalvo on 3/10/2020 6:00 PM    Cta Chest W Wo Contrast    Result Date: 2/26/2020  PROCEDURE: CTA CHEST W WO CONTRAST CLINICAL INFORMATION: Thoracic AAA repair 2018, with contrast. COMPARISON: Chest x-ray 2/25/2020 TECHNIQUE: 3 mm axial images were obtained through the chest after the administration of IV contrast.  A non-contrast localizer was obtained. 3D reconstructions were performed on the scanner to include MIP images through the right and left pulmonary arteries and sagittal images through the chest. Isovue was the intravenous contrast utilized. All CT scans at this facility use dose modulation, iterative reconstruction, and/or weight-based dosing when appropriate to reduce radiation dose to as low as reasonably achievable. FINDINGS: There is an aortic stent graft. Heterogeneous linear densities are seen throughout the descending aorta precontrast. No significant change postcontrast to suggest endoleak. Native aneurysm is slightly larger than previous measuring 5.4 cm transversely, previously 5.1 cm.  Near the [E87.1]     Hypermagnesemia [E83.41]     History of atrial flutter [Z86.79]     History of alcohol abuse [F10.11]     History of cocaine abuse ([facility] [F14.11]     Mild tricuspid regurgitation [I07.1]     Dyspnea [R06.00] 03/09/2020    Pancytopenia ([facility] [D61.818] 03/09/2020    Volume overload [E87.70] 01/26/2020    History of aortic aneurysm repair [Z90.292, Z86.79]     Elevated troponin [R79.89]     ESRD on hemodialysis ([facility] [N18.6, Z99.2]     Pneumonia due to organism [J18.9]     Headache, unspecified headache type [R51] 06/02/2016    Acute on chronic diastolic CHF (congestive heart failure) ([facility] [I50.33]     Uncontrolled hypertension [I10]            With RN in room, patient was updated about the treatment plan, all the questions and concerns were addressed.         Electronically signed by Óscar Villanueva MD on 3/12/2020 at 8:47 AM

## 2020-03-13 VITALS
WEIGHT: 223.11 LBS | TEMPERATURE: 98.2 F | SYSTOLIC BLOOD PRESSURE: 109 MMHG | RESPIRATION RATE: 16 BRPM | HEIGHT: 75 IN | BODY MASS INDEX: 27.74 KG/M2 | OXYGEN SATURATION: 94 % | HEART RATE: 91 BPM | DIASTOLIC BLOOD PRESSURE: 50 MMHG

## 2020-03-13 PROCEDURE — 6370000000 HC RX 637 (ALT 250 FOR IP): Performed by: HOSPITALIST

## 2020-03-13 PROCEDURE — 6370000000 HC RX 637 (ALT 250 FOR IP): Performed by: NURSE PRACTITIONER

## 2020-03-13 PROCEDURE — 99239 HOSP IP/OBS DSCHRG MGMT >30: CPT | Performed by: HOSPITALIST

## 2020-03-13 PROCEDURE — 99232 SBSQ HOSP IP/OBS MODERATE 35: CPT | Performed by: INTERNAL MEDICINE

## 2020-03-13 RX ORDER — AMOXICILLIN AND CLAVULANATE POTASSIUM 500; 125 MG/1; MG/1
1 TABLET, FILM COATED ORAL DAILY
Status: DISCONTINUED | OUTPATIENT
Start: 2020-03-13 | End: 2020-03-13 | Stop reason: HOSPADM

## 2020-03-13 RX ORDER — AMOXICILLIN AND CLAVULANATE POTASSIUM 500; 125 MG/1; MG/1
1 TABLET, FILM COATED ORAL DAILY
Qty: 3 TABLET | Refills: 0 | Status: SHIPPED | OUTPATIENT
Start: 2020-03-13 | End: 2020-03-16

## 2020-03-13 RX ORDER — MINOXIDIL 10 MG/1
20 TABLET ORAL EVERY 12 HOURS
Qty: 21 TABLET | Refills: 1 | Status: ON HOLD | OUTPATIENT
Start: 2020-03-13 | End: 2022-10-06

## 2020-03-13 RX ORDER — LANOLIN ALCOHOL/MO/W.PET/CERES
325 CREAM (GRAM) TOPICAL
Qty: 270 TABLET | Refills: 3 | Status: SHIPPED | OUTPATIENT
Start: 2020-03-13

## 2020-03-13 RX ADMIN — DOXAZOSIN MESYLATE 8 MG: 4 TABLET ORAL at 09:33

## 2020-03-13 RX ADMIN — FAMOTIDINE 20 MG: 20 TABLET, FILM COATED ORAL at 09:34

## 2020-03-13 RX ADMIN — ASPIRIN 81 MG: 81 TABLET ORAL at 09:31

## 2020-03-13 RX ADMIN — Medication 1 SPRAY: at 09:34

## 2020-03-13 RX ADMIN — AMOXICILLIN AND CLAVULANATE POTASSIUM 1 TABLET: 500; 125 TABLET, FILM COATED ORAL at 11:23

## 2020-03-13 RX ADMIN — Medication 1000 UNITS: at 09:40

## 2020-03-13 RX ADMIN — SERTRALINE HYDROCHLORIDE 100 MG: 100 TABLET, FILM COATED ORAL at 09:39

## 2020-03-13 RX ADMIN — ISOSORBIDE MONONITRATE 120 MG: 60 TABLET, EXTENDED RELEASE ORAL at 09:35

## 2020-03-13 ASSESSMENT — PAIN SCALES - GENERAL: PAINLEVEL_OUTOF10: 0

## 2020-03-13 NOTE — DISCHARGE SUMMARY
non-compliance with HD; resolved now. Pt on R/A  2. ESRD on IHD TThS: nephro following  3. Hyperkalemia - d/t missed HD; resolved now. Repeat BMP in 3 days  4. Acute febrile illness - presumably d/t PNA; on ceftriaxone and Doxy since yesterday which will continue as pt still febrile overnight; will follow up culturs  3/13; cultures no growth, afebrile, VSS. Stepped down to Po Augmentin for completion of 5 day course  6. Leukopenia -- stable; WBC 3.1; Vlikely d/t infection; however, Vit B12 level in lowe-NL limit; placed on cyanocobalamin; repeat CBC in 3 days  7. Hyponatremia : resolved with HD, Na 135. Repeat BMP in 3 days with F/U w/ PCP  8. Thrombocytopenia mild, stable, chronic  12. Chronically elevated troponin -CP free, stable; will monitor clinically  13. Hx of suboptimally controlled essential HTN: fairly well-controlled on current regimen, CCM and will monitor  3/13: well-controlled on current regimen; Pt and PCP need to monitor BP with antihypertensive regimen adjustment as needed     14. Hx atrial flutter s/p ALEISHA CV 3/2018 : NSR ; NO OAC due to hx of aneurysms   15. HLD -- cont statin  16. Hx Thoracic and abdominal aortic aneurysm with dissection s/p thoracoabdominal endograft repair in [place] 7/2018 -- TEVAR of a Nghia Type B chronic dissection with left carotid subclavian bypass 7/2018  -- last CTA chest 2/2020 = Native thoracic aortic aneurysm is slightly larger than the previous CT. Its lumen likely contains previous hemorrhage. There is an endograft without gross evidence of endoleak  17. Chronic macrocytic anemia -- stable, started on vit b12; sent iron studies c/w BRISEIDA; started on ferrous sulfate  18. Depression/PTSD -- mood stable - cont zoloft  19. Secondary hyperparathyroidism -- 2/2 ESRD, nephro following  20. Noncompliance: counseling offered  21. Tobacco abuse: counseling and nicotine patch offered.      22.  Hx alcohol and cocaine abuse -- no UDS/etoh level done - pt denies and has been educated on cessation multiple times  23. GERD -- cont Pepcid  24. REZA not on CPAP  25. Mild TR -- per echo 3/10/2020  26. Dispo: denied needs, home with family support    Addendum: received cc'ed B/C 1/2 growing GPC in clusters; likely contaminant, but will convery information to PCP to early F/U. Exam:     Vitals:  Vitals:    03/12/20 1554 03/12/20 2237 03/13/20 0504 03/13/20 0915   BP: (!) 102/50 (!) 103/51 (!) 120/59 (!) 109/50   Pulse: 97 84 90 91   Resp: 18 18 18 16   Temp: 99.6 °F (37.6 °C) 98 °F (36.7 °C) 98.9 °F (37.2 °C) 98.2 °F (36.8 °C)   TempSrc: Oral Oral Oral Oral   SpO2: 92% 93% 91% 94%   Weight:   223 lb 1.7 oz (101.2 kg)    Height:         Weight: Weight: 223 lb 1.7 oz (101.2 kg)     24 hour intake/output:    Intake/Output Summary (Last 24 hours) at 3/13/2020 1017  Last data filed at 3/13/2020 0506  Gross per 24 hour   Intake 200 ml   Output 3400 ml   Net -3200 ml           General appearance: A&O x3, Not ill or toxic, in no apparent distress  HEENT:  GREGORY  EOM intact. Neck: Supple, with full range of motion. No jugular venous distention. Trachea midline. Respiratory:   NL A/E bilat with no adventitious sounds   Cardiovascular:  normal S1/S2 with no murmurs/gallops  Abdomen: Soft, non-tender, non-distended, no rigidity or peritoneal signs  Musculoskeletal: NL symmetrical A/PROM bilat U/L extremities   Skin: No rashes. No edema  Neurologic:  CN II-XII intact. NL symmetrical reflexes. NL gait and stance. NL Cerebellar exam. Power 5/5 all muscle groups U/L extremities. Toes downgoing  Capillary Refill: Brisk,< 3 seconds   Peripheral Pulses: +2 palpable, equal bilaterally      Labs:  For convenience and continuity at follow-up the following most recent labs are provided:      CBC:    Lab Results   Component Value Date    WBC 3.1 03/12/2020    HGB 9.3 03/12/2020    HCT 30.0 03/12/2020     03/12/2020       Renal:    Lab Results   Component Value Date     03/12/2020    K 5.0 overload. Stable small bilateral pleural effusions, larger on the right. Additional stable findings as detailed above. **This report has been created using voice recognition software. It may contain minor errors which are inherent in voice recognition technology. **      Final report electronically signed by Dr. Jackie Lam on 3/9/2020 4:08 AM             Consults:     STR ED TO IP CONSULT  STR ED TO IP CONSULT    Disposition:    [x] Home       [] TCU       [] Rehab       [] Psych       [] SNF       [] [facility]       [] Other-    Condition at Discharge: Stable    Code Status:  Full Code     Patient Instructions:    Discharge lab work: CBC, BMP in 3 days  Activity: activity as tolerated  Diet: DIET RENAL;      Follow-up visits:   Dulce Khan MD  [facility] [address] [facility]  [phone]               Discharge Medications:      Kiya Streeter, [address] Medication Instructions [id number]    Printed on:03/13/20 1017   Medication Information                      acetaminophen (TYLENOL) 325 MG tablet  Take 650 mg by mouth every 4 hours as needed for Pain or Fever             aspirin 81 MG EC tablet  Take 1 tablet by mouth daily             atorvastatin (LIPITOR) 40 MG tablet  Take 1 tablet by mouth nightly             B Complex-C-Zn-Folic Acid (DIALYVITE 627-WXQM 15 PO)  Take 1 tablet by mouth daily             cloNIDine (CATAPRES) 0.3 MG tablet  Take 1 tablet by mouth 3 times daily             cyclobenzaprine (FLEXERIL) 5 MG tablet  Take 5 mg by mouth 3 times daily as needed for Muscle spasms             doxazosin (CARDURA) 8 MG tablet  Take 1 tablet by mouth every 12 hours for 28 days             famotidine (PEPCID) 20 MG tablet  Take 1 tablet by mouth daily             fluticasone (FLONASE) 50 MCG/ACT nasal spray  1 spray by Nasal route daily              hydrOXYzine (ATARAX) 50 MG tablet  Take 50 mg by mouth 3 times daily as needed for Itching             isosorbide mononitrate (IMDUR) 120 MG extended release tablet  Take 1 tablet by mouth daily             minoxidil (LONITEN) 10 MG tablet  Take 1.5 tablets by mouth every 12 hours for 28 days             nitroGLYCERIN (NITROSTAT) 0.4 MG SL tablet  up to max of 3 total doses. If no relief after 1 dose, call 911.             sertraline (ZOLOFT) 100 MG tablet  Take 100 mg by mouth daily             Sucroferric Oxyhydroxide (VELPHORO) 500 MG CHEW  Take 500 mg by mouth 3 times daily (with meals) 1 tablet two times daily with snacks             traZODone (DESYREL) 50 MG tablet  Take 1 tablet by mouth nightly as needed for Sleep             verapamil (CALAN SR) 240 MG extended release tablet  Take 240 mg by mouth daily             vitamin D (CHOLECALCIFEROL) 25 MCG (1000 UT) TABS tablet  Take 1,000 Units by mouth daily                 Time Spent on discharge is more than 45 minutes in the examination, evaluation, counseling and review of medications and discharge plan. With RN in room, patient wand wife were updated about the treatment plan, all the questions and concerns were addressed. Alarming signs and symptoms to return to ED were explained in length. Signed: Thank you Dimitri Moore MD for the opportunity to be involved in this patient's care.     Electronically signed by Marylin Kelley MD on 3/13/2020 at 10:17 AM

## 2020-03-13 NOTE — PROGRESS NOTES
Discharge teaching and instructions for diagnosis/procedure of fluid volume overload completed with patient using teachback method. AVS reviewed. Printed prescriptions given to patient. Patient voiced understanding regarding prescriptions, follow up appointments, and care of self at home.  Discharged in a wheelchair to  home with support per family

## 2020-03-13 NOTE — PROGRESS NOTES
Renal Progress Note    Assessment and Plan:    1. End stage kidney disease on hemodialysis. 2.  Volume overload resolved  3. Hypertension primary controlled  4. Diabetes mellitus type 2  5. Macrocytic anemia  6.   Thrombocytopenia  PLAN:  Labs reviewed  Medications reviewed  No changes  Okay to discharge from renal perspective when discharged by the primary service  Discussed with the patient  Discussed with staff  We will follow in the outpatient dialysis clinic    Patient Active Problem List:     FSGS (focal segmental glomerulosclerosis)     Diabetes mellitus type 2, controlled ([address]     Monoclonal (M) protein disease, multiple 'M' protein     Depression     PTSD (post-traumatic stress disorder)     Secondary renal hyperparathyroidism ([address]     Cocaine use     Substance induced mood disorder ([address]     Renal artery stenosis ([address] with uncontrollable hypertension     Chronic alcoholism ([facility])     Severe cocaine use disorder ([address]     Essential hypertension     Uncontrolled hypertension     LVH (left ventricular hypertrophy) due to hypertensive disease     Acute on chronic diastolic CHF (congestive heart failure) ([facility])     REZA (obstructive sleep apnea)     Headache, unspecified headache type     FH: HTN (hypertension)     Hypertrophic cardiomyopathy ([address]     Diet-controlled diabetes mellitus ([address]     A-V fistula ([address]     Chronic thoracic aortic dissection (HCC)     Hyperphosphatemia     Anemia in CKD (chronic kidney disease)     Vitamin D deficiency     Thrombocytopenia (HCC)     Personality disorder (HCC)     Tobacco abuse     Metabolic acidosis     Pneumonia due to organism     Precordial pain     ESRD on hemodialysis (HCC)     Edema of upper extremity     ESRD (end stage renal disease) (HCC)     Hyperglycemia     Elevated troponin     Hyperkalemia     Cocaine abuse, continuous - reports spending $100 on cocaine \"every other day\"     Homelessness     Moderate episode of recurrent major depressive disorder ([facility]     Noncompliance of patient with renal dialysis ([facility]     Anemia associated with stage 5 chronic renal failure ([facility]     Hypertensive emergency     Cocaine use disorder, severe, dependence ([facility]     Alcohol use disorder, severe, dependence ([facility]     Abdominal aortic aneurysm (AAA) without rupture (HCC)     Atrial flutter with rapid ventricular response (HCC)     PVD (peripheral vascular disease) (HCC)     Dyslipidemia     Other fluid overload     Accelerated hypertension     Laceration of flexor muscle, fascia and tendon of left thumb at forearm level, initial encounter     Acute respiratory failure with hypoxia (HCC)     Acute pulmonary edema (HCC)     Macrocytic anemia     History of aortic aneurysm repair     Medical non-compliance     Volume overload     Chest pain     Dyspnea     Pancytopenia (HCC)     Acute febrile illness     Hyponatremia     Hypermagnesemia     History of atrial flutter     History of alcohol abuse     History of cocaine abuse (HCC)     Mild tricuspid regurgitation      Subjective:   Admit Date: 3/9/2020    Interval History:   Seen for end stage kidney disease on hemodialysis  Awake and alert this morning  No complaint  Updated by the staff  No new issues  Stable blood pressure for the most part      Medications:   Scheduled Meds:   doxycycline hyclate  100 mg Oral 2 times per day    cefTRIAXone (ROCEPHIN) IV  1 g Intravenous Q24H    aspirin  81 mg Oral Daily    atorvastatin  40 mg Oral Nightly    cloNIDine  0.3 mg Oral TID    doxazosin  8 mg Oral 2 times per day    famotidine  20 mg Oral Daily    fluticasone  1 spray Nasal Daily    isosorbide mononitrate  120 mg Oral Daily    sertraline  100 mg Oral Daily    Sucroferric Oxyhydroxide  500 mg Oral TID WC    verapamil  240 mg Oral Daily    Vitamin D  1,000 Units Oral Daily    sodium chloride flush  10 mL Intravenous 2 times per day    minoxidil  20 mg Oral Q12H     Continuous Infusions:    CBC:   Recent Labs

## 2020-03-16 ENCOUNTER — TELEPHONE (OUTPATIENT)
Dept: NEPHROLOGY | Age: 55
End: 2020-03-16

## 2020-03-16 NOTE — TELEPHONE ENCOUNTER
----- Message from Rahel Lyles MD sent at 3/16/2020  4:10 PM EDT -----  Call [facility] will give vancomycin 1500 mg once tomorrow   Call and ask how he is doing   If he is sick then to the hospital

## 2020-03-16 NOTE — TELEPHONE ENCOUNTER
Called Enrique and brenda Topeten this information-she states that she will call the patient and see how he is doing today

## 2020-03-17 LAB
ACINETOBACTER BAUMANNII FILM ARRAY: NOT DETECTED
BLOOD CULTURE, ROUTINE: ABNORMAL
BLOOD CULTURE, ROUTINE: ABNORMAL
BOTTLE TYPE: ABNORMAL
CANDIDA ALBICANS FILM ARRAY: NOT DETECTED
CANDIDA GLABRATA FILM ARRAY: NOT DETECTED
CANDIDA KRUSEI FILM ARRAY: NOT DETECTED
CANDIDA PARAPSILOSIS FILM ARRAY: NOT DETECTED
CANDIDA TROPICALIS FILM ARRAY: NOT DETECTED
CARBAPENEM RESITANT FILM ARRAY: ABNORMAL
ENTERBACTER CLOACAE FILM ARRAY: NOT DETECTED
ENTERBACTERIACEAE FILM ARRAY: NOT DETECTED
ENTEROCOCCUS FILM ARRAY: NOT DETECTED
ESCHERICHIA COLI FILM ARRAY: NOT DETECTED
HAEMOPHILUS INFLUENZA FILM ARRAY: NOT DETECTED
KLEBSIELLA OXYTOCA FILM ARRAY: NOT DETECTED
KLEBSIELLA PNEUMONIAE FILM ARRAY: NOT DETECTED
LISTERIA MONOCYTOGENES FILM ARRAY: NOT DETECTED
METHICILLIN RESISTANT FILM ARRAY: NOT DETECTED
NEISSERIA MENIGITIDIS FILM ARRAY: NOT DETECTED
ORGANISM: ABNORMAL
PROTEUS FILM ARRAY: NOT DETECTED
PSEUDOMONAS AERUGINOSA FILM ARRAY: NOT DETECTED
SERRATIA MARCESCENS FILM ARRAY: NOT DETECTED
SOURCE OF BLOOD CULTURE: ABNORMAL
STAPH AUREUS FILM ARRAY: NOT DETECTED
STAPHYLOCOCCUS FILM ARRAY: DETECTED
STREP AGALACTIAE FILM ARRAY: NOT DETECTED
STREP PNEUMONIAE FILM ARRAY: NOT DETECTED
STREP PYOCGENES FILM ARRAY: NOT DETECTED
STREPTOCOCCUS FILM ARRAY: NOT DETECTED
VANCOMYCIN RESISTANT FILM ARRAY: ABNORMAL

## 2020-07-02 NOTE — PROGRESS NOTES
irricept used for irrigation of surgical wound Cheiloplasty (Less Than 50%) Text: A decision was made to reconstruct the defect with a  cheiloplasty.  The defect was undermined extensively.  Additional obicularis oris muscle was excised with a 15 blade scalpel.  The defect was converted into a full thickness wedge, of less than 50% of the vertical height of the lip, to facilite a better cosmetic result.  Small vessels were then tied off with 5-0 monocyrl. The obicularis oris, superficial fascia, adipose and dermis were then reapproximated.  After the deeper layers were approximated the epidermis was reapproximated with particular care given to realign the vermilion border.

## 2020-07-06 ENCOUNTER — HOSPITAL ENCOUNTER (INPATIENT)
Age: 55
LOS: 1 days | Discharge: HOME | DRG: 640 | End: 2020-07-07
Attending: FAMILY MEDICINE | Admitting: FAMILY MEDICINE
Payer: MEDICARE

## 2020-07-06 ENCOUNTER — APPOINTMENT (OUTPATIENT)
Dept: GENERAL RADIOLOGY | Age: 55
DRG: 640 | End: 2020-07-06
Payer: MEDICARE

## 2020-07-06 LAB
ALBUMIN SERPL-MCNC: 3.6 G/DL (ref 3.5–5.1)
ALP BLD-CCNC: 68 U/L (ref 38–126)
ALT SERPL-CCNC: 7 U/L (ref 11–66)
ANION GAP SERPL CALCULATED.3IONS-SCNC: 15 MEQ/L (ref 8–16)
AST SERPL-CCNC: 11 U/L (ref 5–40)
BASOPHILS # BLD: 0.3 %
BASOPHILS ABSOLUTE: 0 THOU/MM3 (ref 0–0.1)
BILIRUB SERPL-MCNC: 0.3 MG/DL (ref 0.3–1.2)
BUN BLDV-MCNC: 98 MG/DL (ref 7–22)
CALCIUM SERPL-MCNC: 9.4 MG/DL (ref 8.5–10.5)
CHLORIDE BLD-SCNC: 97 MEQ/L (ref 98–111)
CO2: 23 MEQ/L (ref 23–33)
CREAT SERPL-MCNC: 14 MG/DL (ref 0.4–1.2)
EKG ATRIAL RATE: 98 BPM
EKG P AXIS: 52 DEGREES
EKG P-R INTERVAL: 198 MS
EKG Q-T INTERVAL: 376 MS
EKG QRS DURATION: 112 MS
EKG QTC CALCULATION (BAZETT): 480 MS
EKG R AXIS: -52 DEGREES
EKG T AXIS: 96 DEGREES
EKG VENTRICULAR RATE: 98 BPM
EOSINOPHIL # BLD: 2.2 %
EOSINOPHILS ABSOLUTE: 0.2 THOU/MM3 (ref 0–0.4)
ERYTHROCYTE [DISTWIDTH] IN BLOOD BY AUTOMATED COUNT: 13.4 % (ref 11.5–14.5)
ERYTHROCYTE [DISTWIDTH] IN BLOOD BY AUTOMATED COUNT: 50.4 FL (ref 35–45)
GFR SERPL CREATININE-BSD FRML MDRD: 4 ML/MIN/1.73M2
GLUCOSE BLD-MCNC: 101 MG/DL (ref 70–108)
HCT VFR BLD CALC: 36.6 % (ref 42–52)
HEMOGLOBIN: 11.2 GM/DL (ref 14–18)
IMMATURE GRANS (ABS): 0.02 THOU/MM3 (ref 0–0.07)
IMMATURE GRANULOCYTES: 0.3 %
LACTIC ACID: 0.6 MMOL/L (ref 0.5–2.2)
LYMPHOCYTES # BLD: 8.8 %
LYMPHOCYTES ABSOLUTE: 0.7 THOU/MM3 (ref 1–4.8)
MCH RBC QN AUTO: 31.2 PG (ref 26–33)
MCHC RBC AUTO-ENTMCNC: 30.6 GM/DL (ref 32.2–35.5)
MCV RBC AUTO: 101.9 FL (ref 80–94)
MONOCYTES # BLD: 7.8 %
MONOCYTES ABSOLUTE: 0.6 THOU/MM3 (ref 0.4–1.3)
NUCLEATED RED BLOOD CELLS: 0 /100 WBC
OSMOLALITY CALCULATION: 300.7 MOSMOL/KG (ref 275–300)
PLATELET # BLD: 126 THOU/MM3 (ref 130–400)
PMV BLD AUTO: 10.5 FL (ref 9.4–12.4)
POTASSIUM REFLEX MAGNESIUM: 8.2 MEQ/L (ref 3.5–5.2)
PRO-BNP: ABNORMAL PG/ML (ref 0–900)
PROCALCITONIN: 0.27 NG/ML (ref 0.01–0.09)
RBC # BLD: 3.59 MILL/MM3 (ref 4.7–6.1)
REASON FOR REJECTION: NORMAL
REASON FOR REJECTION: NORMAL
REJECTED TEST: NORMAL
REJECTED TEST: NORMAL
SARS-COV-2, NAAT: NOT DETECTED
SEG NEUTROPHILS: 80.6 %
SEGMENTED NEUTROPHILS ABSOLUTE COUNT: 6.2 THOU/MM3 (ref 1.8–7.7)
SODIUM BLD-SCNC: 135 MEQ/L (ref 135–145)
TOTAL PROTEIN: 6.7 G/DL (ref 6.1–8)
TROPONIN T: 0.06 NG/ML
WBC # BLD: 7.7 THOU/MM3 (ref 4.8–10.8)

## 2020-07-06 PROCEDURE — 85025 COMPLETE CBC W/AUTO DIFF WBC: CPT

## 2020-07-06 PROCEDURE — U0002 COVID-19 LAB TEST NON-CDC: HCPCS

## 2020-07-06 PROCEDURE — 99223 1ST HOSP IP/OBS HIGH 75: CPT | Performed by: FAMILY MEDICINE

## 2020-07-06 PROCEDURE — 36415 COLL VENOUS BLD VENIPUNCTURE: CPT

## 2020-07-06 PROCEDURE — 99283 EMERGENCY DEPT VISIT LOW MDM: CPT

## 2020-07-06 PROCEDURE — 93005 ELECTROCARDIOGRAM TRACING: CPT | Performed by: FAMILY MEDICINE

## 2020-07-06 PROCEDURE — 84484 ASSAY OF TROPONIN QUANT: CPT

## 2020-07-06 PROCEDURE — 90935 HEMODIALYSIS ONE EVALUATION: CPT | Performed by: INTERNAL MEDICINE

## 2020-07-06 PROCEDURE — 99222 1ST HOSP IP/OBS MODERATE 55: CPT | Performed by: INTERNAL MEDICINE

## 2020-07-06 PROCEDURE — 80053 COMPREHEN METABOLIC PANEL: CPT

## 2020-07-06 PROCEDURE — 99291 CRITICAL CARE FIRST HOUR: CPT

## 2020-07-06 PROCEDURE — G0257 UNSCHED DIALYSIS ESRD PT HOS: HCPCS

## 2020-07-06 PROCEDURE — 84145 PROCALCITONIN (PCT): CPT

## 2020-07-06 PROCEDURE — 71045 X-RAY EXAM CHEST 1 VIEW: CPT

## 2020-07-06 PROCEDURE — 83605 ASSAY OF LACTIC ACID: CPT

## 2020-07-06 PROCEDURE — 83880 ASSAY OF NATRIURETIC PEPTIDE: CPT

## 2020-07-06 PROCEDURE — 87040 BLOOD CULTURE FOR BACTERIA: CPT

## 2020-07-06 RX ORDER — DEXTROSE MONOHYDRATE 25 G/50ML
25 INJECTION, SOLUTION INTRAVENOUS ONCE
Status: DISCONTINUED | OUTPATIENT
Start: 2020-07-06 | End: 2020-07-06

## 2020-07-06 RX ORDER — CALCIUM CHLORIDE 100 MG/ML
1 INJECTION INTRAVENOUS; INTRAVENTRICULAR ONCE
Status: DISCONTINUED | OUTPATIENT
Start: 2020-07-06 | End: 2020-07-07 | Stop reason: HOSPADM

## 2020-07-06 NOTE — ED PROVIDER NOTES
[address] COMPLAINT   Chief Complaint   Patient presents with    Shortness of Breath        HPI   Linda Golden is a 50 y.o. male with a hx of ESRD on [facility] dialysis who skipped his Saturday session who presents with shortness of breath and tachypnea. Pt denies any fever, cough or chest pain, onset was this morning. The duration has been constant. No triggers or relieving factor. REVIEW OF SYSTEMS   Cardiac: +dyspnea on exertion; neg Chest Pain, Denies syncope   Respiratory: +sob and dyspnea; Denies cough or hemoptysis   GI: Denies Vomiting or Diarrhea   General: Denies Fever   All other review of systems are reviewed and are otherwise negative. PAST MEDICAL & SURGICAL HISTORY   Past Medical History:   Diagnosis Date    AAA (abdominal aortic aneurysm) (HCC)     Acute on chronic diastolic CHF (congestive heart failure), NYHA class 2 ([address]     NURIS (acute kidney injury) ([address] 9/24/2015    Anemia associated with chronic renal failure     Arthritis     stated in hands    Cocaine abuse ([address] 5/10/2014    Depression     Diabetes mellitus ([address]     pt states he no longer has diabetes he has lost alot of davion.      Diastolic heart failure secondary to coronary artery disease (HCC)     FSGS (focal segmental glomerulosclerosis) 5/23/2013    Hemodialysis patient ([address] 10/17/2016    on hemodialysis with [facility] 1/16/2012    History of blood transfusion     Hyperlipidemia     Hyperphosphatemia 5/21/2016    Hypertension     Left renal artery stenosis ([address] 5/22/2014    Monoclonal (M) protein disease, multiple 'M' protein     Nicotine dependence 6/16/2014    Noncompliance     Pneumonia     Psychiatric problem     PTSD (post-traumatic stress disorder)     Pt vet from [place]    Secondary hyperparathyroidism (of renal origin)     Sleep apnea       Past Surgical History:   Procedure Laterality Date    ABDOMINAL AORTIC release tablet Take 1 tablet by mouth daily 7 tablet 0    traZODone (DESYREL) 50 MG tablet Take 1 tablet by mouth nightly as needed for Sleep 7 tablet 0    atorvastatin (LIPITOR) 40 MG tablet Take 1 tablet by mouth nightly 7 tablet 0    fluticasone (FLONASE) 50 MCG/ACT nasal spray 1 spray by Nasal route daily           ALLERGIES   Allergies   Allergen Reactions    Humalog [Insulin Lispro] Other (See Comments)     Extreme sweating and intolerance. Patient absolutely refuses due to reaction.      Insulin Regular Human Hives    Lisinopril Swelling     Swelling in lips         SOCIAL & FAMILY HISTORY   Social History     Socioeconomic History    Marital status:      Spouse name: Andrea    Number of children: 2    Years of education: 15    Highest education level: Not on file   Occupational History     Employer: UNEMPLOYED   Social Needs    Financial resource strain: Not on file    Food insecurity     Worry: Not on file     Inability: Not on file   [employer] needs     Medical: Not on file     Non-medical: Not on file   Tobacco Use    Smoking status: Current Some Day Smoker     Packs/day: 0.25     Years: 20.00     Pack years: 5.00     Types: Cigarettes     Start date: 10/11/1985    Smokeless tobacco: Never Used    Tobacco comment: occasional   Substance and Sexual Activity    Alcohol use: No    Drug use: Not Currently     Comment: hx of    Sexual activity: Yes     Partners: Female   Lifestyle    Physical activity     Days per week: Not on file     Minutes per session: Not on file    Stress: Not on file   Relationships    Social connections     Talks on phone: Not on file     Gets together: Not on file     Attends Alevism service: Not on file     Active member of club or organization: Not on file     Attends meetings of clubs or organizations: Not on file     Relationship status: Not on file    Intimate partner violence     Fear of current or ex partner: Not on file     Emotionally abused: Not on file     Physically abused: Not on file     Forced sexual activity: Not on file   Other Topics Concern    Not on file   Social History Narrative    Not on file      Family History   Problem Relation Age of Onset    Cancer Mother     Other Brother         aneurysm         PHYSICAL EXAM   VITAL SIGNS: BP (!) 171/69   Pulse 72   Temp 98.1 °F (36.7 °C)   Resp 23   SpO2 96%    Constitutional: Well developed, well nourished, moderate acute distress   HENT: Atraumatic, moist mucus membranes   Neck: supple, no JVD   Respiratory: Lungs with decreased bibasilar breath sounds with mild tachypnea, no retractions   Cardiovascular: Reg rate, normal rhythm, no murmurs   Vascular: Radial pulses 2+ equal bilaterally, palpable thrill noted to LUE, at site of AV fistula  GI: Soft, nontender, normal bowel sounds   Musculoskeletal: No edema, no deformities   Integument: Skin warm and dry, no petechiae   Neurologic: Alert & oriented, normal speech   Psych: Pleasant affect, no hallucinations     EKG (interpreted by me) 7/6/2020 1857  Rhythm: Sinus   Rate: 98 BPM   Axis: normal   Conduction: normal   ST/T Segments: peaked TW on leads V3-V6. RADIOLOGY/PROCEDURES   XR CHEST PORTABLE   Final Result   Bilateral airspace opacities and pleural effusions concerning for edema or pneumonia               **This report has been created using voice recognition software. It may contain minor errors which are inherent in voice recognition technology. **      Final report electronically signed by Dr. Eliel Lam on 7/6/2020 7:53 PM           LABS   Labs Reviewed   CBC WITH AUTO DIFFERENTIAL - Abnormal; Notable for the following components:       Result Value    RBC 3.59 (*)     Hemoglobin 11.2 (*)     Hematocrit 36.6 (*)     .9 (*)     MCHC 30.6 (*)     RDW-SD 50.4 (*)     Platelets 839 (*)     Lymphocytes Absolute 0.7 (*)     All other components within normal limits   BRAIN NATRIURETIC PEPTIDE - Abnormal; Notable for the following components:    Pro-BNP 70799.0 (*)     All other components within normal limits   COMPREHENSIVE METABOLIC PANEL W/ REFLEX TO MG FOR LOW K - Abnormal; Notable for the following components:    CREATININE 14.0 (*)     BUN 98 (*)     Potassium reflex Magnesium 8.2 (*)     Chloride 97 (*)     ALT 7 (*)     All other components within normal limits   TROPONIN - Abnormal; Notable for the following components:    Troponin T 0.064 (*)     All other components within normal limits   PROCALCITONIN - Abnormal; Notable for the following components:    Procalcitonin 0.27 (*)     All other components within normal limits   GLOMERULAR FILTRATION RATE, ESTIMATED - Abnormal; Notable for the following components:    Est, Glom Filt Rate 4 (*)     All other components within normal limits   OSMOLALITY - Abnormal; Notable for the following components:    Osmolality Calc 300.7 (*)     All other components within normal limits   CULTURE, BLOOD 1   CULTURE, BLOOD 2   SPECIMEN REJECTION   LACTIC ACID, PLASMA   ANION GAP   SPECIMEN REJECTION   POTASSIUM   BASIC METABOLIC PANEL   [id number]        ED COURSE & MEDICAL DECISION MAKING   Pertinent Labs & Imaging studies reviewed and interpreted. (See chart for details)   See chart for details of medications given during the ED stay. Vitals:    07/06/20 2022   BP: (!) 171/69   Pulse: 72   Resp: 23   Temp:    SpO2: 96%      CRITICAL CARE NOTE:   There was a high probability of clinically significant life-threatening deterioration of the patient's condition requiring my urgent intervention. Total critical care time is 30 minutes. This includes vital sign monitoring, pulse oximetry monitoring, telemetry monitoring, clinical response to the IV medications, reviewing the nursing notes, consultation time, dictation/documetation time. (This time excludes time spent performing procedures).      PT IS AT HIGH RISK FOR DECOMPENSATION BASED ON HYPERACUTE TW ON EKG, K OF 8.2, WILL NEED CALCIUM CHLORIDE, BICARB AND D50 (PT ALLERGIC TO INSULIN). Consultation: pending consult with nephrology    The transition of care will be at 10:05 PM.     Differential Diagnosis: Acute Coronary Syndrome, Congestive Heart Failure, end stage renal disease, hyperkalemia    FINAL IMPRESSION   1. Uremia, acute    2. ESRD (end stage renal disease) ([facility]    3. Hyperkalemia         Plan: Admission to the hospital     MDM - concern for hyperkalemia, as pt missed his dialysis session on July 4th Saturday. Pt will likely need emergent dialysis. Pt has fluid overload, likely needs emergent dialysis. Will discuss case with Dr. Saeid Glover who is on call for Dr. Velia Mcguire. PT has hyperkalemia 8.2, with BUN/Cr ratio of 98/14, will need emergent dialysis. Consult placed to Dr. Brown/nephrology. Pt's COVID test pending. Will admit to . Dr. Saeid Glover wanted pt to be taken to dialysis.  After that, he will probably need to be admitted to >    Electronically signed by: Danyel Stephenson MD, 7/6/2020 10:05 PM   (This note was completed with a voice recognition program)        Jonny Jalloh MD  07/06/20 5715

## 2020-07-07 VITALS
HEART RATE: 98 BPM | WEIGHT: 216.71 LBS | BODY MASS INDEX: 26.95 KG/M2 | RESPIRATION RATE: 18 BRPM | SYSTOLIC BLOOD PRESSURE: 148 MMHG | DIASTOLIC BLOOD PRESSURE: 72 MMHG | HEIGHT: 75 IN | OXYGEN SATURATION: 94 % | TEMPERATURE: 97.5 F

## 2020-07-07 LAB
ANION GAP SERPL CALCULATED.3IONS-SCNC: 12 MEQ/L (ref 8–16)
BUN BLDV-MCNC: 66 MG/DL (ref 7–22)
CALCIUM SERPL-MCNC: 9 MG/DL (ref 8.5–10.5)
CHLORIDE BLD-SCNC: 95 MEQ/L (ref 98–111)
CO2: 29 MEQ/L (ref 23–33)
CREAT SERPL-MCNC: 11.1 MG/DL (ref 0.4–1.2)
ERYTHROCYTE [DISTWIDTH] IN BLOOD BY AUTOMATED COUNT: 13.5 % (ref 11.5–14.5)
ERYTHROCYTE [DISTWIDTH] IN BLOOD BY AUTOMATED COUNT: 50.4 FL (ref 35–45)
GFR SERPL CREATININE-BSD FRML MDRD: 6 ML/MIN/1.73M2
GLUCOSE BLD-MCNC: 113 MG/DL (ref 70–108)
HCT VFR BLD CALC: 35.5 % (ref 42–52)
HEMOGLOBIN: 11.1 GM/DL (ref 14–18)
MAGNESIUM: 2.7 MG/DL (ref 1.6–2.4)
MCH RBC QN AUTO: 31.7 PG (ref 26–33)
MCHC RBC AUTO-ENTMCNC: 31.3 GM/DL (ref 32.2–35.5)
MCV RBC AUTO: 101.4 FL (ref 80–94)
MRSA SCREEN RT-PCR: NEGATIVE
PHOSPHORUS: 4.4 MG/DL (ref 2.4–4.7)
PLATELET # BLD: 118 THOU/MM3 (ref 130–400)
PMV BLD AUTO: 10.2 FL (ref 9.4–12.4)
POTASSIUM REFLEX MAGNESIUM: 6 MEQ/L (ref 3.5–5.2)
POTASSIUM SERPL-SCNC: 5.2 MEQ/L (ref 3.5–5.2)
RBC # BLD: 3.5 MILL/MM3 (ref 4.7–6.1)
SODIUM BLD-SCNC: 136 MEQ/L (ref 135–145)
VANCOMYCIN RESISTANT ENTEROCOCCUS: NEGATIVE
WBC # BLD: 6 THOU/MM3 (ref 4.8–10.8)

## 2020-07-07 PROCEDURE — 94761 N-INVAS EAR/PLS OXIMETRY MLT: CPT

## 2020-07-07 PROCEDURE — 2580000003 HC RX 258: Performed by: FAMILY MEDICINE

## 2020-07-07 PROCEDURE — 1200000003 HC TELEMETRY R&B

## 2020-07-07 PROCEDURE — 2700000000 HC OXYGEN THERAPY PER DAY

## 2020-07-07 PROCEDURE — 85027 COMPLETE CBC AUTOMATED: CPT

## 2020-07-07 PROCEDURE — 87081 CULTURE SCREEN ONLY: CPT

## 2020-07-07 PROCEDURE — 83735 ASSAY OF MAGNESIUM: CPT

## 2020-07-07 PROCEDURE — 6370000000 HC RX 637 (ALT 250 FOR IP): Performed by: FAMILY MEDICINE

## 2020-07-07 PROCEDURE — 87641 MR-STAPH DNA AMP PROBE: CPT

## 2020-07-07 PROCEDURE — 84132 ASSAY OF SERUM POTASSIUM: CPT

## 2020-07-07 PROCEDURE — 80048 BASIC METABOLIC PNL TOTAL CA: CPT

## 2020-07-07 PROCEDURE — 6360000002 HC RX W HCPCS: Performed by: FAMILY MEDICINE

## 2020-07-07 PROCEDURE — 87500 VANOMYCIN DNA AMP PROBE: CPT

## 2020-07-07 PROCEDURE — 99232 SBSQ HOSP IP/OBS MODERATE 35: CPT | Performed by: INTERNAL MEDICINE

## 2020-07-07 PROCEDURE — 99238 HOSP IP/OBS DSCHRG MGMT 30/<: CPT | Performed by: INTERNAL MEDICINE

## 2020-07-07 PROCEDURE — 90935 HEMODIALYSIS ONE EVALUATION: CPT

## 2020-07-07 PROCEDURE — 5A1D70Z PERFORMANCE OF URINARY FILTRATION, INTERMITTENT, LESS THAN 6 HOURS PER DAY: ICD-10-PCS | Performed by: INTERNAL MEDICINE

## 2020-07-07 PROCEDURE — 36415 COLL VENOUS BLD VENIPUNCTURE: CPT

## 2020-07-07 PROCEDURE — 84100 ASSAY OF PHOSPHORUS: CPT

## 2020-07-07 RX ORDER — NITROGLYCERIN 0.4 MG/1
0.4 TABLET SUBLINGUAL EVERY 5 MIN PRN
Status: DISCONTINUED | OUTPATIENT
Start: 2020-07-07 | End: 2020-07-07 | Stop reason: HOSPADM

## 2020-07-07 RX ORDER — ONDANSETRON 2 MG/ML
4 INJECTION INTRAMUSCULAR; INTRAVENOUS EVERY 6 HOURS PRN
Status: DISCONTINUED | OUTPATIENT
Start: 2020-07-07 | End: 2020-07-07 | Stop reason: HOSPADM

## 2020-07-07 RX ORDER — ASPIRIN 81 MG/1
81 TABLET ORAL DAILY
Status: DISCONTINUED | OUTPATIENT
Start: 2020-07-07 | End: 2020-07-07 | Stop reason: HOSPADM

## 2020-07-07 RX ORDER — ATORVASTATIN CALCIUM 40 MG/1
40 TABLET, FILM COATED ORAL NIGHTLY
Status: DISCONTINUED | OUTPATIENT
Start: 2020-07-07 | End: 2020-07-07 | Stop reason: HOSPADM

## 2020-07-07 RX ORDER — HYDROXYZINE HYDROCHLORIDE 25 MG/1
50 TABLET, FILM COATED ORAL 3 TIMES DAILY PRN
Status: DISCONTINUED | OUTPATIENT
Start: 2020-07-07 | End: 2020-07-07 | Stop reason: HOSPADM

## 2020-07-07 RX ORDER — FAMOTIDINE 20 MG/1
20 TABLET, FILM COATED ORAL DAILY
Status: DISCONTINUED | OUTPATIENT
Start: 2020-07-07 | End: 2020-07-07 | Stop reason: CLARIF

## 2020-07-07 RX ORDER — FLUTICASONE PROPIONATE 50 MCG
1 SPRAY, SUSPENSION (ML) NASAL DAILY
Status: DISCONTINUED | OUTPATIENT
Start: 2020-07-07 | End: 2020-07-07 | Stop reason: HOSPADM

## 2020-07-07 RX ORDER — DOXAZOSIN MESYLATE 4 MG/1
8 TABLET ORAL EVERY 12 HOURS SCHEDULED
Status: DISCONTINUED | OUTPATIENT
Start: 2020-07-07 | End: 2020-07-07 | Stop reason: HOSPADM

## 2020-07-07 RX ORDER — VITAMIN B COMPLEX
1000 TABLET ORAL DAILY
Status: DISCONTINUED | OUTPATIENT
Start: 2020-07-07 | End: 2020-07-07 | Stop reason: HOSPADM

## 2020-07-07 RX ORDER — SODIUM CHLORIDE 0.9 % (FLUSH) 0.9 %
10 SYRINGE (ML) INJECTION EVERY 12 HOURS SCHEDULED
Status: DISCONTINUED | OUTPATIENT
Start: 2020-07-07 | End: 2020-07-07 | Stop reason: HOSPADM

## 2020-07-07 RX ORDER — SERTRALINE HYDROCHLORIDE 100 MG/1
100 TABLET, FILM COATED ORAL DAILY
Status: DISCONTINUED | OUTPATIENT
Start: 2020-07-07 | End: 2020-07-07 | Stop reason: HOSPADM

## 2020-07-07 RX ORDER — PANTOPRAZOLE SODIUM 40 MG/1
40 TABLET, DELAYED RELEASE ORAL
Status: DISCONTINUED | OUTPATIENT
Start: 2020-07-07 | End: 2020-07-07 | Stop reason: HOSPADM

## 2020-07-07 RX ORDER — HEPARIN SODIUM 5000 [USP'U]/ML
5000 INJECTION, SOLUTION INTRAVENOUS; SUBCUTANEOUS EVERY 8 HOURS SCHEDULED
Status: DISCONTINUED | OUTPATIENT
Start: 2020-07-07 | End: 2020-07-07 | Stop reason: HOSPADM

## 2020-07-07 RX ORDER — ACETAMINOPHEN 325 MG/1
650 TABLET ORAL EVERY 6 HOURS PRN
Status: DISCONTINUED | OUTPATIENT
Start: 2020-07-07 | End: 2020-07-07 | Stop reason: HOSPADM

## 2020-07-07 RX ORDER — FERROUS SULFATE 325(65) MG
325 TABLET ORAL
Status: DISCONTINUED | OUTPATIENT
Start: 2020-07-07 | End: 2020-07-07 | Stop reason: HOSPADM

## 2020-07-07 RX ORDER — TRAZODONE HYDROCHLORIDE 50 MG/1
50 TABLET ORAL NIGHTLY PRN
Status: DISCONTINUED | OUTPATIENT
Start: 2020-07-07 | End: 2020-07-07 | Stop reason: HOSPADM

## 2020-07-07 RX ORDER — VERAPAMIL HYDROCHLORIDE 240 MG/1
240 TABLET, FILM COATED, EXTENDED RELEASE ORAL DAILY
Status: DISCONTINUED | OUTPATIENT
Start: 2020-07-07 | End: 2020-07-07 | Stop reason: HOSPADM

## 2020-07-07 RX ORDER — ISOSORBIDE MONONITRATE 60 MG/1
120 TABLET, EXTENDED RELEASE ORAL DAILY
Status: DISCONTINUED | OUTPATIENT
Start: 2020-07-07 | End: 2020-07-07 | Stop reason: HOSPADM

## 2020-07-07 RX ORDER — ACETAMINOPHEN 650 MG/1
650 SUPPOSITORY RECTAL EVERY 6 HOURS PRN
Status: DISCONTINUED | OUTPATIENT
Start: 2020-07-07 | End: 2020-07-07 | Stop reason: HOSPADM

## 2020-07-07 RX ORDER — SODIUM CHLORIDE 0.9 % (FLUSH) 0.9 %
10 SYRINGE (ML) INJECTION PRN
Status: DISCONTINUED | OUTPATIENT
Start: 2020-07-07 | End: 2020-07-07 | Stop reason: HOSPADM

## 2020-07-07 RX ORDER — PROMETHAZINE HYDROCHLORIDE 25 MG/1
12.5 TABLET ORAL EVERY 6 HOURS PRN
Status: DISCONTINUED | OUTPATIENT
Start: 2020-07-07 | End: 2020-07-07 | Stop reason: HOSPADM

## 2020-07-07 RX ORDER — POLYETHYLENE GLYCOL 3350 17 G/17G
17 POWDER, FOR SOLUTION ORAL DAILY PRN
Status: DISCONTINUED | OUTPATIENT
Start: 2020-07-07 | End: 2020-07-07 | Stop reason: HOSPADM

## 2020-07-07 RX ADMIN — CLONIDINE HYDROCHLORIDE 0.3 MG: 0.2 TABLET ORAL at 13:28

## 2020-07-07 RX ADMIN — VERAPAMIL HYDROCHLORIDE 240 MG: 240 TABLET, FILM COATED, EXTENDED RELEASE ORAL at 06:20

## 2020-07-07 RX ADMIN — Medication 10 ML: at 13:31

## 2020-07-07 RX ADMIN — FLUTICASONE PROPIONATE 1 SPRAY: 50 SPRAY, METERED NASAL at 13:29

## 2020-07-07 RX ADMIN — ASPIRIN 81 MG: 81 TABLET ORAL at 13:30

## 2020-07-07 RX ADMIN — FERROUS SULFATE TAB 325 MG (65 MG ELEMENTAL FE) 325 MG: 325 (65 FE) TAB at 13:27

## 2020-07-07 RX ADMIN — CLONIDINE HYDROCHLORIDE 0.3 MG: 0.2 TABLET ORAL at 06:21

## 2020-07-07 RX ADMIN — ISOSORBIDE MONONITRATE 120 MG: 60 TABLET ORAL at 06:21

## 2020-07-07 RX ADMIN — HEPARIN SODIUM 5000 UNITS: 5000 INJECTION INTRAVENOUS; SUBCUTANEOUS at 06:18

## 2020-07-07 RX ADMIN — SERTRALINE 100 MG: 100 TABLET, FILM COATED ORAL at 13:28

## 2020-07-07 RX ADMIN — VITAMIN D, TAB 1000IU (100/BT) 1000 UNITS: 25 TAB at 13:28

## 2020-07-07 RX ADMIN — DOXAZOSIN 8 MG: 4 TABLET ORAL at 06:21

## 2020-07-07 NOTE — PROGRESS NOTES
CLINICAL PHARMACY: DISCHARGE MED RECONCILIATION/REVIEW    [facility]) Select Patient?: No  Total # of Interventions Recommended: 0   -   Total # Interventions Accepted: 0  Intervention Severity:   - Level 1 Intervention Present?: No   - Level 2 #: 0   - Level 3 #: 0   Time Spent (min): 15    Additional Documentation:  AVS reviewed for discharge. Medication section completed.

## 2020-07-07 NOTE — PROGRESS NOTES
Renal Progress Note    Assessment and Plan:    1. End stage kidney disease on hemodialysis. 2.  Severe hyperkalemia improved with dialysis. 3.  Hypertension primary  4.   Macrocytic anemia  PLAN:  Labs reviewed  Serum potassium remains high but is improved  Medications reviewed  No changes  Hemodialysis today to bring him back to his regular day  Discussed with patient and staff  Monitor blood pressure  Okay to discharge if discharged by the primary service after hemodialysis today if blood pressure is better    Patient Active Problem List:     FSGS (focal segmental glomerulosclerosis)     Diabetes mellitus type 2, controlled ([address]     Monoclonal (M) protein disease, multiple 'M' protein     Depression     PTSD (post-traumatic stress disorder)     Secondary renal hyperparathyroidism ([address]     Cocaine use     Substance induced mood disorder ([address]     Renal artery stenosis ([address] with uncontrollable hypertension     Chronic alcoholism ([address]     Severe cocaine use disorder ([address]     Essential hypertension     Uncontrolled hypertension     LVH (left ventricular hypertrophy) due to hypertensive disease     Acute on chronic diastolic CHF (congestive heart failure) ([facility])     REZA (obstructive sleep apnea)     Headache, unspecified headache type     FH: HTN (hypertension)     Hypertrophic cardiomyopathy ([address]     Diet-controlled diabetes mellitus ([address]     A-V fistula ([address]     Chronic thoracic aortic dissection (HCC)     Hyperphosphatemia     Anemia in CKD (chronic kidney disease)     Vitamin D deficiency     Thrombocytopenia (HCC)     Personality disorder (HCC)     Tobacco abuse     Metabolic acidosis     Pneumonia due to organism     Precordial pain     ESRD on hemodialysis (HCC)     Edema of upper extremity     ESRD (end stage renal disease) ([address]     Hyperglycemia     Hyperkalemia     Cocaine abuse, continuous - reports spending $100 on cocaine \"every other day\"     Homelessness     Moderate episode of recurrent major Continuous Infusions:    CBC:   Recent Labs     07/06/20  1930 07/07/20  0537   WBC 7.7 6.0   HGB 11.2* 11.1*   * 118*     CMP:    Recent Labs     07/06/20 2022 07/07/20 0122 07/07/20  0537     --  136   K 8.2* 5.2 6.0*   CL 97*  --  95*   CO2 23  --  29   BUN 98*  --  66*   CREATININE 14.0*  --  11.1*   GLUCOSE 101  --  113*   CALCIUM 9.4  --  9.0   LABGLOM 4*  --  6*     Troponin: No results for input(s): TROPONINI in the last 72 hours. BNP: No results for input(s): BNP in the last 72 hours. INR: No results for input(s): INR in the last 72 hours. Lipids: No results for input(s): CHOL, LDLDIRECT, TRIG, HDL, AMYLASE, LIPASE in the last 72 hours. Liver:   Recent Labs     07/06/20 2022   AST 11   ALT 7*   ALKPHOS 68   PROT 6.7   LABALBU 3.6   BILITOT 0.3     Iron:  No results for input(s): IRONS, FERRITIN in the last 72 hours. Invalid input(s): LABIRONS  XR CHEST PORTABLE   Final Result   Bilateral airspace opacities and pleural effusions concerning for edema or pneumonia               **This report has been created using voice recognition software. It may contain minor errors which are inherent in voice recognition technology. **      Final report electronically signed by Dr. Cait Ken on 7/6/2020 7:53 PM            Objective:   Vitals: /67   Pulse 94   Temp 98.2 °F (36.8 °C)   Resp 19   Ht 6' 3\" (1.905 m)   Wt 222 lb 7.1 oz (100.9 kg)   SpO2 94%   BMI 27.80 kg/m²    Wt Readings from Last 3 Encounters:   07/07/20 222 lb 7.1 oz (100.9 kg)   03/13/20 223 lb 1.7 oz (101.2 kg)   02/26/20 230 lb 6.1 oz (104.5 kg)      24HR INTAKE/OUTPUT:      Intake/Output Summary (Last 24 hours) at 7/7/2020 1229  Last data filed at 7/7/2020 0422  Gross per 24 hour   Intake 600 ml   Output 3900 ml   Net -3300 ml       Constitutional:  Alert, awake, no apparent distress   Skin:normal with no rash or lesions. HEENT:Pupils are reactive . Throat is clear . Oral mucosa is moist   Neck:supple with no thyromegaly or carotid bruit  Cardiovascular:  S1, S2 without murmur or rubs   Respiratory:  Clear to ausculation without wheezes, rhonchi or rales. Abdomen: +bs, soft, no tenderness to palpation and no palpable mass. No abdominal bruit   Ext: No LE edema  Musculoskeletal:Intact  Neuro:Alert and awake. Well oriented  with no focal deficit. Speech is normal      Electronically signed by Jeanie Peralta MD on 7/7/2020 at 12:29 PM

## 2020-07-07 NOTE — DISCHARGE SUMMARY
20 MG tablet  Take 1 tablet by mouth daily             ferrous sulfate (FE TABS 325) 325 (65 Fe) MG EC tablet  Take 1 tablet by mouth 3 times daily (with meals)             fluticasone (FLONASE) 50 MCG/ACT nasal spray  1 spray by Nasal route daily              hydrOXYzine (ATARAX) 50 MG tablet  Take 50 mg by mouth 3 times daily as needed for Itching             isosorbide mononitrate (IMDUR) 120 MG extended release tablet  Take 1 tablet by mouth daily             minoxidil (LONITEN) 10 MG tablet  Take 2 tablets by mouth every 12 hours for 28 days             nitroGLYCERIN (NITROSTAT) 0.4 MG SL tablet  up to max of 3 total doses. If no relief after 1 dose, call 911.             sertraline (ZOLOFT) 100 MG tablet  Take 100 mg by mouth daily             traZODone (DESYREL) 50 MG tablet  Take 1 tablet by mouth nightly as needed for Sleep             verapamil (CALAN SR) 240 MG extended release tablet  Take 240 mg by mouth daily             vitamin D (CHOLECALCIFEROL) 25 MCG (1000 UT) TABS tablet  Take 1,000 Units by mouth daily                 Patient Instructions:  Discharge lab work: Activity: activity as tolerated  Diet: Renal: no added salt (3-4gm)  Code status: Full    Follow-up Visits:  Dalbert Severance, MD  [address] [facility],[address]  [phone]    Schedule an appointment as soon as possible for a visit in 1 week      [facility] [place] [address] [address] [place] [address]  [phone]    Appointment time is: [facility] @ 7 am as prior    Ayush Wiggins, [address] [facility], [address] [facility]  [address] [place] [address]  [phone]      Will see as scheduled in hemodialysis    Procedures: Hemodialysis    Consults:  IP CONSULT TO NEPHROLOGY    Examination:  Vitals:  Vitals:    07/07/20 0615 07/07/20 0750 07/07/20 1250 07/07/20 1324   BP: (!) 191/88 123/67 (!) 148/79 (!) 148/72   Pulse: 97 94 96 98   Resp:  19 20 18   Temp:  98.2 °F (36.8 °C) 98 °F (36.7 °C) 97.5 °F (36.4 °C)   TempSrc:    Oral SpO2:  94% 97% 94%   Weight:  222 lb 7.1 oz (100.9 kg) 216 lb 11.4 oz (98.3 kg)    Height:         24 Hour Intake/Output:    Intake/Output Summary (Last 24 hours) at 7/7/2020 1809  Last data filed at 7/7/2020 1250  Gross per 24 hour   Intake 1000 ml   Output 6900 ml   Net -5900 ml     Physical Exam  HENT:      Head: Normocephalic and atraumatic. Nose: Nose normal.      Mouth/Throat:      Mouth: Mucous membranes are moist.      Pharynx: Oropharynx is clear. Eyes:      Conjunctiva/sclera: Conjunctivae normal.   Neck:      Musculoskeletal: Normal range of motion and neck supple. Cardiovascular:      Rate and Rhythm: Normal rate and regular rhythm. Pulses: Normal pulses. Heart sounds: Normal heart sounds. Pulmonary:      Effort: Pulmonary effort is normal. No respiratory distress. Breath sounds: Normal breath sounds. No wheezing. Abdominal:      General: Abdomen is flat. Bowel sounds are normal. There is distension. Palpations: Abdomen is soft. Musculoskeletal: Normal range of motion. Skin:     General: Skin is warm and dry. Capillary Refill: Capillary refill takes less than 2 seconds. Coloration: Skin is not jaundiced. Neurological:      General: No focal deficit present. Mental Status: He is alert and oriented to person, place, and time. Mental status is at baseline. Psychiatric:         Mood and Affect: Mood normal.         Behavior: Behavior normal.         Thought Content:  Thought content normal.         Judgment: Judgment normal.     Labs:  Recent Results (from the past 72 hour(s))   EKG 12 Lead    Collection Time: 07/06/20  6:57 PM   Result Value Ref Range    Ventricular Rate 98 BPM    Atrial Rate 98 BPM    P-R Interval 198 ms    QRS Duration 112 ms    Q-T Interval 376 ms    QTc Calculation (Bazett) 480 ms    P Axis 52 degrees    R Axis -52 degrees    T Axis 96 degrees   CBC Auto Differential    Collection Time: 07/06/20  7:30 PM   Result Value Ref Range WBC 7.7 4.8 - 10.8 thou/mm3    RBC 3.59 (L) 4.70 - 6.10 mill/mm3    Hemoglobin 11.2 (L) 14.0 - 18.0 gm/dl    Hematocrit 36.6 (L) 42.0 - 52.0 %    .9 (H) 80.0 - 94.0 fL    MCH 31.2 26.0 - 33.0 pg    MCHC 30.6 (L) 32.2 - 35.5 gm/dl    RDW-CV 13.4 11.5 - 14.5 %    RDW-SD 50.4 (H) 35.0 - 45.0 fL    Platelets 452 (L) 687 - 400 thou/mm3    MPV 10.5 9.4 - 12.4 fL    Seg Neutrophils 80.6 %    Lymphocytes 8.8 %    Monocytes 7.8 %    Eosinophils 2.2 %    Basophils 0.3 %    Immature Granulocytes 0.3 %    Segs Absolute 6.2 1.8 - 7.7 thou/mm3    Lymphocytes Absolute 0.7 (L) 1.0 - 4.8 thou/mm3    Monocytes Absolute 0.6 0.4 - 1.3 thou/mm3    Eosinophils Absolute 0.2 0.0 - 0.4 thou/mm3    Basophils Absolute 0.0 0.0 - 0.1 thou/mm3    Immature Grans (Abs) 0.02 0.00 - 0.07 thou/mm3    nRBC 0 /100 wbc   SPECIMEN REJECTION    Collection Time: 07/06/20  7:57 PM   Result Value Ref Range    Rejected Test Chemistries     Reason for Rejection see below    Brain Natriuretic Peptide    Collection Time: 07/06/20  8:22 PM   Result Value Ref Range    Pro-BNP 91209.0 (H) 0.0 - 900.0 pg/mL   Comprehensive Metabolic Panel w/ Reflex to MG    Collection Time: 07/06/20  8:22 PM   Result Value Ref Range    Glucose 101 70 - 108 mg/dL    CREATININE 14.0 (HH) 0.4 - 1.2 mg/dL    BUN 98 (H) 7 - 22 mg/dL    Sodium 135 135 - 145 meq/L    Potassium reflex Magnesium 8.2 (HH) 3.5 - 5.2 meq/L    Chloride 97 (L) 98 - 111 meq/L    CO2 23 23 - 33 meq/L    Calcium 9.4 8.5 - 10.5 mg/dL    AST 11 5 - 40 U/L    Alkaline Phosphatase 68 38 - 126 U/L    Total Protein 6.7 6.1 - 8.0 g/dL    Alb 3.6 3.5 - 5.1 g/dL    Total Bilirubin 0.3 0.3 - 1.2 mg/dL    ALT 7 (L) 11 - 66 U/L   Troponin    Collection Time: 07/06/20  8:22 PM   Result Value Ref Range    Troponin T 0.064 (A) ng/ml   Lactic Acid, Plasma    Collection Time: 07/06/20  8:22 PM   Result Value Ref Range    Lactic Acid 0.6 0.5 - 2.2 mmol/L   Culture, Blood 1    Collection Time: 07/06/20  8:22 PM   Result Value Ref Range    Blood Culture, Routine No growth-preliminary     Procalcitonin    Collection Time: 07/06/20  8:22 PM   Result Value Ref Range    Procalcitonin 0.27 (H) 0.01 - 0.09 ng/mL   Anion Gap    Collection Time: 07/06/20  8:22 PM   Result Value Ref Range    Anion Gap 15.0 8.0 - 16.0 meq/L   Glomerular Filtration Rate, Estimated    Collection Time: 07/06/20  8:22 PM   Result Value Ref Range    Est, Glom Filt Rate 4 (A) ml/min/1.73m2   Osmolality    Collection Time: 07/06/20  8:22 PM   Result Value Ref Range    Osmolality Calc 300.7 (H) 275.0 - 300 mOsmol/kg   Culture, Blood 2    Collection Time: 07/06/20  8:25 PM   Result Value Ref Range    Blood Culture, Routine No growth-preliminary     SPECIMEN REJECTION    Collection Time: 07/06/20  9:58 PM   Result Value Ref Range    Rejected Test COVID     Reason for Rejection INVALID    COVID-19    Collection Time: 07/06/20 10:19 PM   Result Value Ref Range    SARS-CoV-2, NAAT NOT DETECTED NOT DETECT   MRSA by PCR    Collection Time: 07/07/20  1:09 AM   Result Value Ref Range    MRSA SCREEN RT-PCR NEGATIVE    VRE Screen by PCR    Collection Time: 07/07/20  1:09 AM   Result Value Ref Range    Vancomycin Resistant Enterococcus NEGATIVE    Potassium    Collection Time: 07/07/20  1:22 AM   Result Value Ref Range    Potassium 5.2 3.5 - 5.2 meq/L   CBC    Collection Time: 07/07/20  5:37 AM   Result Value Ref Range    WBC 6.0 4.8 - 10.8 thou/mm3    RBC 3.50 (L) 4.70 - 6.10 mill/mm3    Hemoglobin 11.1 (L) 14.0 - 18.0 gm/dl    Hematocrit 35.5 (L) 42.0 - 52.0 %    .4 (H) 80.0 - 94.0 fL    MCH 31.7 26.0 - 33.0 pg    MCHC 31.3 (L) 32.2 - 35.5 gm/dl    RDW-CV 13.5 11.5 - 14.5 %    RDW-SD 50.4 (H) 35.0 - 45.0 fL    Platelets 438 (L) 512 - 400 thou/mm3    MPV 10.2 9.4 - 12.4 fL   Basic Metabolic Panel w/ Reflex to MG    Collection Time: 07/07/20  5:37 AM   Result Value Ref Range    Sodium 136 135 - 145 meq/L    Potassium reflex Magnesium 6.0 (HH) 3.5 - 5.2 meq/L    Chloride

## 2020-07-07 NOTE — PROGRESS NOTES
Discharge teaching and instructions for diagnosis/procedure of hyperkalemia completed with patient using teachback method. AVS reviewed. Patient voiced understanding regarding prescriptions, follow up appointments, and care of self at home. Discharged in a wheelchair to  home with support per family.

## 2020-07-07 NOTE — PLAN OF CARE
Problem: Falls - Risk of:  Goal: Will remain free from falls  Description: Will remain free from falls  Outcome: Ongoing  Note: No falls this shift, call light in reach. Bed alarm on. Up with 1 assist.      Problem: Pain Control  Goal: Maintain pain level at or below patient's acceptable level (or 5 if patient is unable to determine acceptable level)  Outcome: Ongoing  Note: Denies any pain this shift      Problem: Cardiovascular  Goal: Hemodynamic stability  Outcome: Ongoing  Note: Vital signs stable, telemetry monitor on      Problem: GI  Goal: No bowel complications  Outcome: Ongoing  Note: Denies any diarrhea or constipation     Problem: Nutrition  Goal: Optimal nutrition therapy  Outcome: Ongoing  Note: Tolerating diet     Problem: Skin Integrity/Risk  Goal: No skin breakdown during hospitalization  Outcome: Ongoing  Note: Pt turns and repositions self frequently     Care plan reviewed with patient. Patient verbalize understanding of the plan of care and contribute to goal setting.

## 2020-07-07 NOTE — FLOWSHEET NOTE
07/07/20 7869   Provider Notification   Reason for Communication Critical Value (comment)  (Crt- 11.1; K+- 6.0)   Provider Name [facility]   Provider Notification [facility] Practice Clinician (CNS, NP, CNM, CRNA, PA)   Method of Communication Secure Message   Response Waiting for response   Notification Time [id number]- This RN sent a secure message to [facility] PA to evaluate patient that was admitted 07/06 for hyperkalemia. Patient has had high blood pressures throughout the night. His last BP was 182/93 with a map of 127 at 0422. Would you like some prn BP meds? 0614- This RN sent another secure message to [facility] to notify morning labs showed critical creatinine: 11.1 and potassium 6.0.  Patient is going to dialysis this morning    [place] notified no new orders

## 2020-07-07 NOTE — H&P
**This is a Medical/ PA/ APRN Student Note and is charted for educational purposes. The non-physician staff attested note is not to be used for billing purposes or to guide patient care. Please see the physician modifications/ attestation for treatment plan/suggestions. This note has been reviewed and feedback has been provided to the student. *         History & Physical         Patient: Dangelo Niño  YOB: 1965     MRN: [id number]                                  Acct: [de-identified]     PCP: Myrna Henry MD     Date of Admission: 7/6/2020     Date of Service: Patient seen / examined on 07/06/20 and admitted to inpatient with expected LOS greater than two midnights due to medical therapy.         ASSESSMENT / PLAN:     1. Severe Hyperkalemia, 8.2 on arrival, likely secondary to missed HD  1. Emergent dialysis performed 7/6; K currently 5.2 post-dialysis. EKG prior to dialysis showed peaked T waves, with PA and QRS WNL. 2. Nephrology consulted and following, assistance appreciated. 3. Continue to monitor with BMP in AM, telemetry. 2. Acute hypoxic respiratory failure 2/2 bilateral pulmonary edema (2/2 missed HD)  1. Pt hypoxic at 86% on RA on arrival, currently 92% on 2L NC post-dialysis; COVID negative. Pt sitting up, in no respiratory distress. 2. Continue O2, wean as tolerated to target O2 > 92%. 3. ESRD (HD Tu/Th/Sa); oliguric at baseline  1. See #1 for plan  2. Monitor Mg, P  4. Acute exacerbation of chronic HFpEF (EF~60%), likely secondary to missed HD, fluid overload  1. Pro-BNP 33,142, pt presented hypoxic at 86% on RA, currently 92% on 2L NC  2. See #1 for plan; monitor daily weights, I/Os, symptoms. 5. Uremia, BUN 98, likely secondary to missed HD  1. Pt mentation appropriate upon exam.   2. See #1; continue to monitor with BMP in AM  6. Chronic Troponin Elevation, 0.064, likely secondary to fluid overload from missed HD  1.  Pt currently asymptomatic, repeat if pt develops chest pain.  7. Essential HTN, uncontrolled   1. Currently 172/85, restart home clonidine, verapamil, Imdur, cardura. Monitor. 8. HLD  1. cont statin  9. Hx atrial flutter s/p ALEISHA CV 3/2018  1. NSR ; NO OAC due to hx of aneurysms   10. Hx Thoracic and abdominal aortic aneurysm with dissection s/p thoracoabdominal endograft repair in [place] 7/2018  1. TEVAR of a [place] Type B chronic dissection with left carotid subclavian bypass 7/2018 -- last CTA chest 2/2020 = Native thoracic aortic aneurysm is slightly larger than the previous CT. Its lumen likely contains previous hemorrhage. There is an endograft without gross evidence of endoleak  11. Chronic macrocytic anemia  1. stable, continue vit b12, ferrous sulfate  12. Depression/PTSD  1. mood stable - cont zoloft  13. Secondary hyperparathyroidism 2/2 ESRD  1. Monitor Ca with BMP, nephrology following  14. Hx alcohol and cocaine abuse   1. no UDS/etoh level done - pt denies and has been educated on cessation multiple times  15. Hx of noncompliance:  1. counseling offered in past  16. Tobacco abuse:  1. counseling and nicotine patch offered. 17. GERD  1. continue home Pepcid. 18. REZA not on CPAP:  1. monitor on telemetry. 19. Mild TR  1. per echo 3/10/2020.        Chief Complaint: shortness of breath     History of Present Illness:  49 y/o AA male PMHx ESRD (on HD T/Th/Sat / follows with Dr. Miller Laughter), FSGS, left WHITNEY, Monoclonal (M) protein disease, chronic diastolic CHF (*), HTN, HLD, AAA, chronic anemia, depression, PTSD, former cocaine abuse, chronic tobacco abuse and other PMHx as indicated below -- presents to [facility] with a chief complaint of shortness of breath. History obtained from the patient.     Post-diaylsis, pt denies CP/SOB, palpitations/chest pressure, cough, sore throat, abdominal pain, N/V/D.  Pt admits to smoking \"1-2 cigarettes per day for the last seven years\" but denies alcohol or illicit drug use.     ED course: Rosangela Ramos is a 50 y.o. male with a (TYLENOL) 325 MG tablet Take 650 mg by mouth every 4 hours as needed for Pain or Fever    B Complex-C-Zn-Folic Acid (DIALYVITE 918-EUZG 15 PO) Take 1 tablet by mouth daily    cyclobenzaprine (FLEXERIL) 5 MG tablet Take 5 mg by mouth 3 times daily as needed for Muscle spasms    verapamil (CALAN SR) 240 MG extended release tablet Take 240 mg by mouth daily    vitamin D (CHOLECALCIFEROL) 25 MCG (1000 UT) TABS tablet Take 1,000 Units by mouth daily    cloNIDine (CATAPRES) 0.3 MG tablet Take 1 tablet by mouth 3 times daily    doxazosin (CARDURA) 8 MG tablet Take 1 tablet by mouth every 12 hours for 28 days    famotidine (PEPCID) 20 MG tablet Take 1 tablet by mouth daily    sertraline (ZOLOFT) 100 MG tablet Take 100 mg by mouth daily    Sucroferric Oxyhydroxide (VELPHORO) 500 MG CHEW Take 500 mg by mouth 3 times daily (with meals) 1 tablet two times daily with snacks    hydrOXYzine (ATARAX) 50 MG tablet Take 50 mg by mouth 3 times daily as needed for Itching    aspirin 81 MG EC tablet Take 1 tablet by mouth daily    isosorbide mononitrate (IMDUR) 120 MG extended release tablet Take 1 tablet by mouth daily    traZODone (DESYREL) 50 MG tablet Take 1 tablet by mouth nightly as needed for Sleep    atorvastatin (LIPITOR) 40 MG tablet Take 1 tablet by mouth nightly    fluticasone (FLONASE) 50 MCG/ACT nasal spray 1 spray by Nasal route daily       Allergies:   Humalog [insulin lispro]; Insulin regular human; and Lisinopril     Social History:         Socioeconomic History    Marital status:    Tobacco Use    Smoking status: Current Some Day Smoker       Packs/day: 0.25       Years: 20.00       Pack years: 5.00       Types: Cigarettes       Start date: 10/11/1985    Smokeless tobacco: Never Used   Substance and Sexual Activity    Alcohol use: No    Drug use:  No      Family History:         Problem Relation Age of Onset    Cancer Mother      Other Brother           aneurysm       REVIEW OF SYSTEMS:  A 14-point ROS was obtained and negative, with the exception of pertinent positives as stated in the HPI.     PHYSICAL EXAM:  Vitals        Vitals:     07/06/20 1905 07/06/20 1915   BP:   (!) 117/97   Pulse:   93   Resp:   21   Temp:   98.1 °F (36.7 °C)   SpO2: (!) 86% 98%           General appearance: Sitting up in bed, alert / well-appearing. Cooperative. NAD. HEENT: Normocephalic / atraumatic. Conjunctivae appear normal.  Neck: Supple. No JVD. Respiratory: Normal respiratory effort on 2L NC. Upon auscultation, rales heard in bilateral lung bases, no wheezing / rhonchi. Cardiovascular: tachycardic rate, S1/S2 with presence of S3. III/VI systolic murmur present likely secondary to hx of dissection repair. Abdomen: Soft / non-tender / non-distended. BS present. Musculoskeletal: No cyanosis; 1+ pitting edema of BL LE. Skin: Warm / dry. Normal turgor. AVF on left UE. Neurologic: A/O x 3. Speech normal. Answers questions appropriately. CN intact. No obvious focal neurologic deficits. Psychiatric: Thought content / judgment / insight appear appropriate. Capillary refill: Brisk bilaterally.   Peripheral pulses: +2 bilaterally.     Labs:         Results for orders placed or performed during the hospital encounter of 07/06/20   CBC Auto Differential   Result Value Ref Range     WBC 7.7 4.8 - 10.8 thou/mm3     RBC 3.59 (L) 4.70 - 6.10 mill/mm3     Hemoglobin 11.2 (L) 14.0 - 18.0 gm/dl     Hematocrit 36.6 (L) 42.0 - 52.0 %     .9 (H) 80.0 - 94.0 fL     MCH 31.2 26.0 - 33.0 pg     MCHC 30.6 (L) 32.2 - 35.5 gm/dl     RDW-CV 13.4 11.5 - 14.5 %     RDW-SD 50.4 (H) 35.0 - 45.0 fL     Platelets 246 (L) 569 - 400 thou/mm3     MPV 10.5 9.4 - 12.4 fL     Seg Neutrophils 80.6 %     Lymphocytes 8.8 %     Monocytes 7.8 %     Eosinophils 2.2 %     Basophils 0.3 %     Immature Granulocytes 0.3 %     Segs Absolute 6.2 1.8 - 7.7 thou/mm3     Lymphocytes Absolute 0.7 (L) 1.0 - 4.8 thou/mm3     Monocytes Absolute 0.6 0.4 - 1.3

## 2020-07-07 NOTE — ED NOTES
Pt transported to dialysis      [facility] [place]  07/06/20 2458

## 2020-07-07 NOTE — CARE COORDINATION
7/7/20, 8:57 AM EDT  Met with client; denied needs as plans home with spouse Reina Robert (who transports HD: [facility] [email or web address], has AVF, see AVS, confirmed with [facility] [place]) when medically cleared likely after HD today per report-discussed at rounds, faxed face sheet to [facility] [employer]  Patient goals/plan/ treatment preferences discussed by  and . Patient goals/plan/ treatment preferences reviewed with patient/ family. Patient/ family verbalize understanding of discharge plan and are in agreement with goal/plan/treatment preferences. Understanding was demonstrated using the teach back method. AVS provided by RN at time of discharge, which includes all necessary medical information pertaining to the patients current course of illness, treatment, post-discharge goals of care, and treatment preferences.

## 2020-07-07 NOTE — CONSULTS
Worry: Not on file     Inability: Not on file    Transportation needs     Medical: Not on file     Non-medical: Not on file   Tobacco Use    Smoking status: Current Some Day Smoker     Packs/day: 0.25     Years: 20.00     Pack years: 5.00     Types: Cigarettes     Start date: 10/11/1985    Smokeless tobacco: Never Used    Tobacco comment: occasional   Substance and Sexual Activity    Alcohol use: No    Drug use: Not Currently     Comment: hx of    Sexual activity: Yes     Partners: Female   Lifestyle    Physical activity     Days per week: Not on file     Minutes per session: Not on file    Stress: Not on file   Relationships    Social connections     Talks on phone: Not on file     Gets together: Not on file     Attends Church service: Not on file     Active member of club or organization: Not on file     Attends meetings of clubs or organizations: Not on file     Relationship status: Not on file    Intimate partner violence     Fear of current or ex partner: Not on file     Emotionally abused: Not on file     Physically abused: Not on file     Forced sexual activity: Not on file   Other Topics Concern    Not on file   Social History Narrative    Not on file       Home Meds:  Prior to Admission medications    Medication Sig Start Date End Date Taking? Authorizing Provider   minoxidil (LONITEN) 10 MG tablet Take 2 tablets by mouth every 12 hours for 28 days 3/13/20 4/10/20  Ten Nino MD   ferrous sulfate (FE TABS 325) 325 (65 Fe) MG EC tablet Take 1 tablet by mouth 3 times daily (with meals) 3/13/20   Ten Nino MD   nitroGLYCERIN (NITROSTAT) 0.4 MG SL tablet up to max of 3 total doses.  If no relief after 1 dose, call 911. 2/26/20   Ten Nino MD   acetaminophen (TYLENOL) 325 MG tablet Take 650 mg by mouth every 4 hours as needed for Pain or Fever    Historical Provider, MD   B Complex-C-Zn-Folic Acid (DIALYVITE 293-JBYA 15 PO) Take 1 tablet by mouth daily    Historical Provider, MD   cyclobenzaprine (FLEXERIL) 5 MG tablet Take 5 mg by mouth 3 times daily as needed for Muscle spasms    Historical Provider, MD   verapamil (CALAN SR) 240 MG extended release tablet Take 240 mg by mouth daily    Historical Provider, MD   vitamin D (CHOLECALCIFEROL) 25 MCG (1000 UT) TABS tablet Take 1,000 Units by mouth daily    Historical Provider, MD   cloNIDine (CATAPRES) 0.3 MG tablet Take 1 tablet by mouth 3 times daily 1/28/20 3/8/20  WADE Carrion CNP   doxazosin (CARDURA) 8 MG tablet Take 1 tablet by mouth every 12 hours for 28 days 1/28/20 2/25/20  WADE Carrion CNP   famotidine (PEPCID) 20 MG tablet Take 1 tablet by mouth daily 1/28/20   WADE Carrion CNP   sertraline (ZOLOFT) 100 MG tablet Take 100 mg by mouth daily    Historical Provider, MD   Sucroferric Oxyhydroxide (VELPHORO) 500 MG CHEW Take 500 mg by mouth 3 times daily (with meals) 1 tablet two times daily with snacks    Historical Provider, MD   hydrOXYzine (ATARAX) 50 MG tablet Take 50 mg by mouth 3 times daily as needed for Itching    Historical Provider, MD   aspirin 81 MG EC tablet Take 1 tablet by mouth daily 3/23/18   Lavern Herman MD   isosorbide mononitrate (IMDUR) 120 MG extended release tablet Take 1 tablet by mouth daily 3/24/18   Lavern Herman MD   traZODone (DESYREL) 50 MG tablet Take 1 tablet by mouth nightly as needed for Sleep 3/23/18   Lavern Herman MD   atorvastatin (LIPITOR) 40 MG tablet Take 1 tablet by mouth nightly 3/23/18   Lavern Herman MD   fluticasone (FLONASE) 50 MCG/ACT nasal spray 1 spray by Nasal route daily     Historical Provider, MD       Review of Systems:  Constitutional: Positive for shortness of breath  Head: Negative for headaches  Eyes: Negative for blurry vision or discharge  Ears: Negative for ear pain or hearing changes  Nose: Negative for runny nose or epistaxis  Respiratory: Negative for cough or sputum production. Negative for hemoptysis. Positive for shortness of breath  Cardiovascular: Negative for chest pain  GI: Negative for nausea, vomiting and diarrhea. Negative for hematochezia and melena  Skin: Negative for rash  Musculoskeletal: Negative for joint pain, moves all ext  Neuro: Negative for numbness or tingling, negative for slurred speech  Psychiatric: Reports stable mood, negative for depression or insomnia    All other review of systems were reviewed and negative    Current Meds:  Infusion:   Meds:    calcium chloride  1 g Intravenous Once    sodium bicarbonate  50 mEq Intravenous Once     Meds prn:      Allergies/Intolerances: ALLERGIES: Humalog [insulin lispro]; Insulin regular human; and Lisinopril    24HR INTAKE/OUTPUT:  No intake or output data in the 24 hours ending 07/06/20 2149  No intake/output data recorded. No intake/output data recorded. Admission weight:    Wt Readings from Last 3 Encounters:   03/13/20 223 lb 1.7 oz (101.2 kg)   02/26/20 230 lb 6.1 oz (104.5 kg)   01/28/20 227 lb 15.3 oz (103.4 kg)     There is no height or weight on file to calculate BMI. Physical Examination:  VITALS:   Vitals:    07/06/20 1905 07/06/20 1915 07/06/20 2022   BP:  (!) 117/97 (!) 171/69   Pulse:  93 72   Resp:  21 23   Temp:  98.1 °F (36.7 °C)    SpO2: (!) 86% 98% 96%     Weight:   Wt Readings from Last 3 Encounters:   03/13/20 223 lb 1.7 oz (101.2 kg)   02/26/20 230 lb 6.1 oz (104.5 kg)   01/28/20 227 lb 15.3 oz (103.4 kg)     Constitutional and General Appearance: alert and cooperative with exam, patient noted to be very tachypneic  Eyes: no icteric sclera in left eye or right eye,  no pallor conjunctiva in left or right eye, no discharge seen from left eye or right eye  Ears and Nose: normal external appearance of left and right ear. Both ear lobules are nontender to palpation. Normal external appearance of nose. No active drainage from nose.    Oral: moist oral mucus membranes  Neck: + JVD, appears symmetric, good ROM  Lungs: diminished at the bases, with rales  Chest: No chest wall tenderness  Heart: S1, S2  Extremities: Trace to 1+ LE edema, no tenderness   GI: soft, non-tender, no guarding, no distention  Skin: no rash seen on exposed extremities, warm to touch  Musculo: moves all extremities  Neuro: no slurred speech, no facial drooping, symmetric strength  Psychiatric: Somewhat anxious, not agitated    Lab Data  CBC:   Recent Labs     07/06/20  1930   WBC 7.7   HGB 11.2*   HCT 36.6*   *     BMP:  Recent Labs     07/06/20 2022      K 8.2*   CL 97*   CO2 23   BUN 98*   CREATININE 14.0*   GLUCOSE 101   CALCIUM 9.4     PTH: @PTH@  TSH: No results for input(s): TSH in the last 72 hours. HgBa1c: No results for input(s): LABA1C in the last 72 hours. Hepatic:   Recent Labs     07/06/20 2022   LABALBU 3.6   AST 11   ALT 7*   BILITOT 0.3   ALKPHOS 68     Additional Labs: proBNP greater than 33,000  Diagnostics: Chest x-ray shows bilateral opacities and pleural effusions concerning for edema plus minus pneumonia    Old labs and diagnostics reviewed. Echo: March 2020 EF 60%    Impression and Plan:  1. ESRD on dialysis  2. Critical hyperkalemia  3. Volume overload secondary to missed dialysis  4. Hypoxemia likely secondary to fluid overload/edema  5. Noncompliance with dialysis treatment  6. Anemia in ESRD  7. Hypertension    Call received from ER for emergent dialysis. Case was reviewed. I made immediate arrangements for hemodialysis treatment. Patient was subsequently seen and examined by me on dialysis. Patient reports that he thought that the dialysis unit was closed on July 4 which was Saturday and therefore he did not go for his dialysis treatment. Patient presented with hypoxia secondary to fluid overload. He had critical hyperkalemia with potassium of 8.2. Patient emergently being dialyzed with 2K bath. We will dialyze him tonight and repeat potassium level 1 hour after dialysis treatment.   Will need another

## 2020-07-07 NOTE — PROGRESS NOTES
Pt seen and examined on dialysis  Very short of breath  Tachypnea  Worse with minimal exertion   systolic  HD started and tolerating it well  UF 3.9 kg  Treatment time 2.5 hours tonight  Using 2K bath      Repeat HD tomorrow  Check Potassium one hour after HD treatment tonight    D/w Pt and HD RN

## 2020-07-07 NOTE — FLOWSHEET NOTE
07/06/20 2133 07/07/20 0030   Vital Signs   BP (!) 145/70 (!) 181/88   Temp 97.9 °F (36.6 °C) 97.7 °F (36.5 °C)   Pulse 72 98   Resp 19 19   SpO2 93 % 99 %   Weight   (unable to weigh on ER stretcher)   (unable to weigh pt. on Er stretcher. )   Post-Hemodialysis Assessment   Post-Treatment Procedures  --  Blood returned; Access bleeding time < 10 minutes   Machine Disinfection Process  --  Acid/Vinegar Clean;Heat Disinfect; Exterior Machine Disinfection   Total Liters Processed (l/min)  --  67.3 l/min   Dialyzer Clearance  --  Lightly streaked   Duration of Treatment (minutes)  --  150 minutes   Heparin amount administered during treatment (units)  --  0 units   Hemodialysis Intake (ml)  --  400 ml   Hemodialysis Output (ml)  --  3900 ml   NET Removed (ml)  --  3500 ml   Tolerated Treatment  --  Good   Stable 2 hr and 30 min tx. Removed 3.5 L of fluid. pt tolerated fluid removal well. Pressure held to each needle site x 10 min. Drsg dry and intact upon leaving unit. TX record printed for scanning into EMR. Report called to primary RN.

## 2020-07-07 NOTE — FLOWSHEET NOTE
07/07/20 0750 07/07/20 1250   Vital Signs   /67 (!) 148/79   Temp 98.2 °F (36.8 °C) 98 °F (36.7 °C)   Pulse 94 96   Resp 19 20   SpO2 94 % 97 %   Weight 222 lb 7.1 oz (100.9 kg) 216 lb 11.4 oz (98.3 kg)   Weight Method Bed scale Bed scale   Percent Weight Change -1.27 -2.58   Post-Hemodialysis Assessment   Post-Treatment Procedures  --  Blood returned; Access bleeding time < 10 minutes   Machine Disinfection Process  --  Acid/Vinegar Clean;Heat Disinfect   Total Liters Processed (l/min)  --  116.1 l/min   Dialyzer Clearance  --  Lightly streaked   Duration of Treatment (minutes)  --  270 minutes   Hemodialysis Intake (ml)  --  400 ml   Hemodialysis Output (ml)  --  3000 ml   NET Removed (ml)  --  2600 ml   Tolerated Treatment  --  Good   4.5 hour treatment completed. 2.6L of fluid removed. Patient tolerated fluid removal well with minimum issues. Needles pulled and pressure held for 10 mins x2. Dressing dry/intact and secured upon leaving the unit. Report called to primary floor nurse. Charting printed and placed in patient chart.

## 2020-07-08 ENCOUNTER — CARE COORDINATION (OUTPATIENT)
Dept: CASE MANAGEMENT | Age: 55
End: 2020-07-08

## 2020-07-08 NOTE — CARE COORDINATION
Patient contacted regarding recent discharge and COVID-1`9 risk. Discussed COVID-19 related testing which was available at this time. Test results were negative. Patient informed of results, if available? Yes     Care Transition Nurse/ Ambulatory Care Manager contacted the patient by telephone to perform post discharge assessment. Verified name and  with patient as identifiers. Patient has following risk factors of: ESRF. CTN/ACM reviewed discharge instructions, medical action plan and red flags related to discharge diagnosis. Reviewed and educated them on any new and changed medications related to discharge diagnosis. Advised obtaining a 90-day supply of all daily and as-needed medications. Education provided regarding infection prevention, and signs and symptoms of COVID-19 and when to seek medical attention with patient who verbalized understanding. Discussed exposure protocols and quarantine from [address] you at higher risk for severe illness  and given an opportunity for questions and concerns. The patient agrees to contact the COVID-19 hotline [phone] or PCP office for questions related to their healthcare. CTN/ACM provided contact information for future reference. From CDC: Are you at higher risk for severe illness?  Wash your hands often.  Avoid close contact (6 feet, which is about two arm lengths) with people who are sick.  Put distance between yourself and other people if COVID-19 is spreading in your community.  Clean and disinfect frequently touched surfaces.  Avoid all cruise travel and non-essential air travel.  Call your healthcare professional if you have concerns about COVID-19 and your underlying condition or if you are sick. For more information on steps you can take to protect yourself, see CDC's How to [address] [place] [address] for follow-up call in 7-14 days based on severity of symptoms and risk factors.     Patient stated he has all his

## 2020-07-09 LAB — MRSA SCREEN: NORMAL

## 2020-07-12 LAB
BLOOD CULTURE, ROUTINE: NORMAL
BLOOD CULTURE, ROUTINE: NORMAL

## 2020-07-15 ENCOUNTER — CARE COORDINATION (OUTPATIENT)
Dept: CASE MANAGEMENT | Age: 55
End: 2020-07-15

## 2020-07-27 ENCOUNTER — APPOINTMENT (OUTPATIENT)
Dept: GENERAL RADIOLOGY | Age: 55
DRG: 291 | End: 2020-07-27
Payer: MEDICARE

## 2020-07-27 ENCOUNTER — HOSPITAL ENCOUNTER (INPATIENT)
Age: 55
LOS: 3 days | Discharge: HOME | DRG: 291 | End: 2020-07-30
Attending: EMERGENCY MEDICINE
Payer: MEDICARE

## 2020-07-27 LAB
ALBUMIN SERPL-MCNC: 3.8 G/DL (ref 3.5–5.1)
ALP BLD-CCNC: 81 U/L (ref 38–126)
ALT SERPL-CCNC: 14 U/L (ref 11–66)
ANION GAP SERPL CALCULATED.3IONS-SCNC: 11 MEQ/L (ref 8–16)
APTT: 37.4 SECONDS (ref 22–38)
AST SERPL-CCNC: 16 U/L (ref 5–40)
BASOPHILS # BLD: 0.3 %
BASOPHILS ABSOLUTE: 0 THOU/MM3 (ref 0–0.1)
BILIRUB SERPL-MCNC: 0.3 MG/DL (ref 0.3–1.2)
BUN BLDV-MCNC: 57 MG/DL (ref 7–22)
CALCIUM SERPL-MCNC: 8.7 MG/DL (ref 8.5–10.5)
CHLORIDE BLD-SCNC: 102 MEQ/L (ref 98–111)
CO2: 27 MEQ/L (ref 23–33)
CREAT SERPL-MCNC: 9 MG/DL (ref 0.4–1.2)
EKG ATRIAL RATE: 95 BPM
EKG P AXIS: 53 DEGREES
EKG P-R INTERVAL: 196 MS
EKG Q-T INTERVAL: 394 MS
EKG QRS DURATION: 98 MS
EKG QTC CALCULATION (BAZETT): 495 MS
EKG R AXIS: -42 DEGREES
EKG T AXIS: 97 DEGREES
EKG VENTRICULAR RATE: 95 BPM
EOSINOPHIL # BLD: 1.8 %
EOSINOPHILS ABSOLUTE: 0.1 THOU/MM3 (ref 0–0.4)
ERYTHROCYTE [DISTWIDTH] IN BLOOD BY AUTOMATED COUNT: 13.2 % (ref 11.5–14.5)
ERYTHROCYTE [DISTWIDTH] IN BLOOD BY AUTOMATED COUNT: 49.3 FL (ref 35–45)
GFR SERPL CREATININE-BSD FRML MDRD: 7 ML/MIN/1.73M2
GLUCOSE BLD-MCNC: 92 MG/DL (ref 70–108)
HCT VFR BLD CALC: 38.3 % (ref 42–52)
HEMOGLOBIN: 11.6 GM/DL (ref 14–18)
IMMATURE GRANS (ABS): 0.02 THOU/MM3 (ref 0–0.07)
IMMATURE GRANULOCYTES: 0.3 %
INR BLD: 1 (ref 0.85–1.13)
LYMPHOCYTES # BLD: 9.8 %
LYMPHOCYTES ABSOLUTE: 0.7 THOU/MM3 (ref 1–4.8)
MCH RBC QN AUTO: 31.2 PG (ref 26–33)
MCHC RBC AUTO-ENTMCNC: 30.3 GM/DL (ref 32.2–35.5)
MCV RBC AUTO: 103 FL (ref 80–94)
MONOCYTES # BLD: 7.2 %
MONOCYTES ABSOLUTE: 0.5 THOU/MM3 (ref 0.4–1.3)
NUCLEATED RED BLOOD CELLS: 0 /100 WBC
OSMOLALITY CALCULATION: 294.9 MOSMOL/KG (ref 275–300)
PLATELET # BLD: 126 THOU/MM3 (ref 130–400)
PMV BLD AUTO: 9.1 FL (ref 9.4–12.4)
POTASSIUM REFLEX MAGNESIUM: 6.6 MEQ/L (ref 3.5–5.2)
PRO-BNP: ABNORMAL PG/ML (ref 0–900)
RBC # BLD: 3.72 MILL/MM3 (ref 4.7–6.1)
SEG NEUTROPHILS: 80.6 %
SEGMENTED NEUTROPHILS ABSOLUTE COUNT: 6.1 THOU/MM3 (ref 1.8–7.7)
SODIUM BLD-SCNC: 140 MEQ/L (ref 135–145)
TOTAL PROTEIN: 7.5 G/DL (ref 6.1–8)
TROPONIN T: 0.06 NG/ML
VANCOMYCIN RESISTANT ENTEROCOCCUS: NEGATIVE
WBC # BLD: 7.6 THOU/MM3 (ref 4.8–10.8)

## 2020-07-27 PROCEDURE — 90935 HEMODIALYSIS ONE EVALUATION: CPT

## 2020-07-27 PROCEDURE — 6370000000 HC RX 637 (ALT 250 FOR IP): Performed by: NURSE PRACTITIONER

## 2020-07-27 PROCEDURE — 6360000002 HC RX W HCPCS: Performed by: NURSE PRACTITIONER

## 2020-07-27 PROCEDURE — 36415 COLL VENOUS BLD VENIPUNCTURE: CPT

## 2020-07-27 PROCEDURE — 87081 CULTURE SCREEN ONLY: CPT

## 2020-07-27 PROCEDURE — 85730 THROMBOPLASTIN TIME PARTIAL: CPT

## 2020-07-27 PROCEDURE — 87500 VANOMYCIN DNA AMP PROBE: CPT

## 2020-07-27 PROCEDURE — 84484 ASSAY OF TROPONIN QUANT: CPT

## 2020-07-27 PROCEDURE — 2580000003 HC RX 258: Performed by: NURSE PRACTITIONER

## 2020-07-27 PROCEDURE — 85610 PROTHROMBIN TIME: CPT

## 2020-07-27 PROCEDURE — 93005 ELECTROCARDIOGRAM TRACING: CPT | Performed by: EMERGENCY MEDICINE

## 2020-07-27 PROCEDURE — 99284 EMERGENCY DEPT VISIT MOD MDM: CPT

## 2020-07-27 PROCEDURE — 2060000000 HC ICU INTERMEDIATE R&B

## 2020-07-27 PROCEDURE — 99223 1ST HOSP IP/OBS HIGH 75: CPT | Performed by: NURSE PRACTITIONER

## 2020-07-27 PROCEDURE — 83880 ASSAY OF NATRIURETIC PEPTIDE: CPT

## 2020-07-27 PROCEDURE — 71045 X-RAY EXAM CHEST 1 VIEW: CPT

## 2020-07-27 PROCEDURE — 5A1D70Z PERFORMANCE OF URINARY FILTRATION, INTERMITTENT, LESS THAN 6 HOURS PER DAY: ICD-10-PCS | Performed by: INTERNAL MEDICINE

## 2020-07-27 PROCEDURE — 99222 1ST HOSP IP/OBS MODERATE 55: CPT | Performed by: INTERNAL MEDICINE

## 2020-07-27 PROCEDURE — 80053 COMPREHEN METABOLIC PANEL: CPT

## 2020-07-27 PROCEDURE — 85025 COMPLETE CBC W/AUTO DIFF WBC: CPT

## 2020-07-27 RX ORDER — SODIUM CHLORIDE 0.9 % (FLUSH) 0.9 %
10 SYRINGE (ML) INJECTION EVERY 12 HOURS SCHEDULED
Status: DISCONTINUED | OUTPATIENT
Start: 2020-07-27 | End: 2020-07-30 | Stop reason: HOSPADM

## 2020-07-27 RX ORDER — SODIUM CHLORIDE 0.9 % (FLUSH) 0.9 %
10 SYRINGE (ML) INJECTION PRN
Status: DISCONTINUED | OUTPATIENT
Start: 2020-07-27 | End: 2020-07-30 | Stop reason: HOSPADM

## 2020-07-27 RX ORDER — HEPARIN SODIUM 5000 [USP'U]/ML
5000 INJECTION, SOLUTION INTRAVENOUS; SUBCUTANEOUS EVERY 8 HOURS SCHEDULED
Status: DISCONTINUED | OUTPATIENT
Start: 2020-07-27 | End: 2020-07-30 | Stop reason: HOSPADM

## 2020-07-27 RX ORDER — ATORVASTATIN CALCIUM 40 MG/1
40 TABLET, FILM COATED ORAL NIGHTLY
Status: DISCONTINUED | OUTPATIENT
Start: 2020-07-27 | End: 2020-07-30 | Stop reason: HOSPADM

## 2020-07-27 RX ORDER — PANTOPRAZOLE SODIUM 40 MG/1
40 TABLET, DELAYED RELEASE ORAL DAILY
Status: DISCONTINUED | OUTPATIENT
Start: 2020-07-28 | End: 2020-07-30 | Stop reason: HOSPADM

## 2020-07-27 RX ORDER — HYDROXYZINE HYDROCHLORIDE 25 MG/1
50 TABLET, FILM COATED ORAL 3 TIMES DAILY PRN
Status: DISCONTINUED | OUTPATIENT
Start: 2020-07-27 | End: 2020-07-30 | Stop reason: HOSPADM

## 2020-07-27 RX ORDER — ONDANSETRON 2 MG/ML
4 INJECTION INTRAMUSCULAR; INTRAVENOUS EVERY 6 HOURS PRN
Status: DISCONTINUED | OUTPATIENT
Start: 2020-07-27 | End: 2020-07-30 | Stop reason: HOSPADM

## 2020-07-27 RX ORDER — PROMETHAZINE HYDROCHLORIDE 25 MG/1
12.5 TABLET ORAL EVERY 6 HOURS PRN
Status: DISCONTINUED | OUTPATIENT
Start: 2020-07-27 | End: 2020-07-30 | Stop reason: HOSPADM

## 2020-07-27 RX ORDER — ASPIRIN 81 MG/1
81 TABLET ORAL DAILY
Status: DISCONTINUED | OUTPATIENT
Start: 2020-07-28 | End: 2020-07-30 | Stop reason: HOSPADM

## 2020-07-27 RX ORDER — ACETAMINOPHEN 325 MG/1
650 TABLET ORAL EVERY 6 HOURS PRN
Status: DISCONTINUED | OUTPATIENT
Start: 2020-07-27 | End: 2020-07-30 | Stop reason: HOSPADM

## 2020-07-27 RX ORDER — ACETAMINOPHEN 650 MG/1
650 SUPPOSITORY RECTAL EVERY 6 HOURS PRN
Status: DISCONTINUED | OUTPATIENT
Start: 2020-07-27 | End: 2020-07-30 | Stop reason: HOSPADM

## 2020-07-27 RX ORDER — TRAZODONE HYDROCHLORIDE 50 MG/1
50 TABLET ORAL NIGHTLY PRN
Status: DISCONTINUED | OUTPATIENT
Start: 2020-07-27 | End: 2020-07-30 | Stop reason: HOSPADM

## 2020-07-27 RX ORDER — SERTRALINE HYDROCHLORIDE 100 MG/1
100 TABLET, FILM COATED ORAL DAILY
Status: DISCONTINUED | OUTPATIENT
Start: 2020-07-28 | End: 2020-07-30 | Stop reason: HOSPADM

## 2020-07-27 RX ORDER — DOXAZOSIN MESYLATE 4 MG/1
8 TABLET ORAL EVERY 12 HOURS SCHEDULED
Status: DISCONTINUED | OUTPATIENT
Start: 2020-07-27 | End: 2020-07-30 | Stop reason: HOSPADM

## 2020-07-27 RX ORDER — ACETAMINOPHEN 325 MG/1
650 TABLET ORAL EVERY 4 HOURS PRN
Status: DISCONTINUED | OUTPATIENT
Start: 2020-07-27 | End: 2020-07-27 | Stop reason: SDUPTHER

## 2020-07-27 RX ORDER — POLYETHYLENE GLYCOL 3350 17 G/17G
17 POWDER, FOR SOLUTION ORAL DAILY PRN
Status: DISCONTINUED | OUTPATIENT
Start: 2020-07-27 | End: 2020-07-30 | Stop reason: HOSPADM

## 2020-07-27 RX ORDER — VITAMIN B COMPLEX
1000 TABLET ORAL DAILY
Status: DISCONTINUED | OUTPATIENT
Start: 2020-07-28 | End: 2020-07-30 | Stop reason: HOSPADM

## 2020-07-27 RX ORDER — FERROUS SULFATE 325(65) MG
325 TABLET ORAL
Status: DISCONTINUED | OUTPATIENT
Start: 2020-07-28 | End: 2020-07-30 | Stop reason: HOSPADM

## 2020-07-27 RX ORDER — MINOXIDIL 2.5 MG/1
10 TABLET ORAL 2 TIMES DAILY
Status: DISCONTINUED | OUTPATIENT
Start: 2020-07-28 | End: 2020-07-30 | Stop reason: HOSPADM

## 2020-07-27 RX ORDER — ISOSORBIDE MONONITRATE 60 MG/1
120 TABLET, EXTENDED RELEASE ORAL DAILY
Status: DISCONTINUED | OUTPATIENT
Start: 2020-07-28 | End: 2020-07-30 | Stop reason: HOSPADM

## 2020-07-27 RX ADMIN — SODIUM CHLORIDE, PRESERVATIVE FREE 10 ML: 5 INJECTION INTRAVENOUS at 22:07

## 2020-07-27 RX ADMIN — HEPARIN SODIUM 5000 UNITS: 5000 INJECTION INTRAVENOUS; SUBCUTANEOUS at 22:06

## 2020-07-27 RX ADMIN — DOXAZOSIN 8 MG: 4 TABLET ORAL at 22:07

## 2020-07-27 RX ADMIN — ATORVASTATIN CALCIUM 40 MG: 40 TABLET, FILM COATED ORAL at 22:07

## 2020-07-27 RX ADMIN — CLONIDINE HYDROCHLORIDE 0.3 MG: 0.2 TABLET ORAL at 22:07

## 2020-07-27 ASSESSMENT — ENCOUNTER SYMPTOMS
SINUS PRESSURE: 0
SORE THROAT: 0
COUGH: 1
VOICE CHANGE: 0
NAUSEA: 0
CONSTIPATION: 0
DIARRHEA: 0
TROUBLE SWALLOWING: 0
VOMITING: 0
WHEEZING: 0
ABDOMINAL PAIN: 0
RHINORRHEA: 0
SHORTNESS OF BREATH: 1
BACK PAIN: 0
CHEST TIGHTNESS: 0

## 2020-07-27 ASSESSMENT — PAIN SCALES - GENERAL
PAINLEVEL_OUTOF10: 0
PAINLEVEL_OUTOF10: 0

## 2020-07-27 NOTE — ED PROVIDER NOTES
[address] [facility] COMPLAINT    Chief Complaint   Patient presents with    Shortness of Breath       Nurses Notes reviewed and I agree except as noted in the HPI. HPI    Niles December is a 50 y.o. male with a past medical history of end-stage renal disease, diabetes, hypertension, left renal artery stenosis hemodialysis patient who presents for evaluation of shortness of breath. Patient came in today complaining of 2 to 3 hours of difficulty breathing. Patient states it feels like he has \"fluid in his lungs \". Patient states that he has had these symptoms before the most recent being 1 month ago. At that time the patient stated he was fluid overloaded and just required dialysis to treat his symptoms. Patient's normal dialysis is Tuesday, Thursday, Saturday. Patient scheduled his dialysis to be done today (7/27/2020) at 1630 but could not wait long enough for dialysis and sought medical attention. Patient believes his current symptoms today are the exact same due to the fact that he has been increasing his fluid intake because of the weather being hot. Patient does also complain of a cough with no production of phlegm but denies any chest pain, abdominal pain, fever, chills, nausea, vomiting, sweating. REVIEW OF SYSTEMS    Review of Systems   Constitutional: Negative for appetite change, chills, diaphoresis, fatigue and fever. HENT: Negative for congestion, ear discharge, ear pain, postnasal drip, rhinorrhea, sinus pressure, sore throat, trouble swallowing and voice change. Respiratory: Positive for cough and shortness of breath. Negative for chest tightness and wheezing. Cardiovascular: Negative for chest pain, palpitations and leg swelling. Gastrointestinal: Negative for abdominal pain, constipation, diarrhea, nausea and vomiting.    Musculoskeletal: Negative for arthralgias, back pain, joint swelling, myalgias, neck pain and neck stiffness. Skin: Negative for rash. Neurological: Negative for dizziness, syncope, weakness, light-headedness, numbness and headaches. PAST MEDICAL HISTORY     has a past medical history of AAA (abdominal aortic aneurysm) ([facility] [address] NURIS [facility]acute kidney [facility]) [facility] Anemia associated with chronic renal failure, Arthritis, Cocaine abuse ([facility] [address] Depression, Diabetes mellitus ([facility] FSGS (focal segmental glomerulosclerosis), Hemodialysis patient ([facility] Hemorrhoids, History of blood transfusion, Hyperlipidemia, Hyperphosphatemia, Hypertension, Left renal artery stenosis ([facility] [address] Monoclonal (M) protein disease, multiple 'M' protein, Nicotine dependence, Noncompliance, Pneumonia, Psychiatric problem, PTSD (post-traumatic stress disorder), Secondary hyperparathyroidism (of renal origin), and Sleep apnea. SURGICAL HISTORY   has a past surgical history that includes Leg Tendon Surgery; EKG 12 Lead (9/24/2015); Dialysis fistula creation (Left, 07/08/2016); vascular surgery (Left, 07/08/2016); vascular surgery (Right, 1990); Hand tendon surgery (Left, 4/5/2019); and Abdominal aortic aneurysm repair.     CURRENT MEDICATIONS    Previous Medications    ACETAMINOPHEN (TYLENOL) 325 MG TABLET    Take 650 mg by mouth every 4 hours as needed for Pain or Fever    ASPIRIN 81 MG EC TABLET    Take 1 tablet by mouth daily    ATORVASTATIN (LIPITOR) 40 MG TABLET    Take 1 tablet by mouth nightly    B COMPLEX-C-ZN-FOLIC ACID (DIALYVITE 984-DZTG 15 PO)    Take 1 tablet by mouth daily    CLONIDINE (CATAPRES) 0.3 MG TABLET    Take 1 tablet by mouth 3 times daily    DOXAZOSIN (CARDURA) 8 MG TABLET    Take 1 tablet by mouth every 12 hours for 28 days    FAMOTIDINE (PEPCID) 20 MG TABLET    Take 1 tablet by mouth daily    FERROUS SULFATE (FE TABS 325) 325 (65 FE) MG EC TABLET    Take 1 tablet by mouth 3 times daily (with meals)    FLUTICASONE (FLONASE) 50 MCG/ACT NASAL SPRAY    1 spray by Nasal route daily HYDROXYZINE (ATARAX) 50 MG TABLET    Take 50 mg by mouth 3 times daily as needed for Itching    ISOSORBIDE MONONITRATE (IMDUR) 120 MG EXTENDED RELEASE TABLET    Take 1 tablet by mouth daily    MINOXIDIL (LONITEN) 10 MG TABLET    Take 2 tablets by mouth every 12 hours for 28 days    NITROGLYCERIN (NITROSTAT) 0.4 MG SL TABLET    up to max of 3 total doses. If no relief after 1 dose, call 911. SERTRALINE (ZOLOFT) 100 MG TABLET    Take 100 mg by mouth daily    TRAZODONE (DESYREL) 50 MG TABLET    Take 1 tablet by mouth nightly as needed for Sleep    VERAPAMIL (CALAN SR) 240 MG EXTENDED RELEASE TABLET    Take 240 mg by mouth daily    VITAMIN D (CHOLECALCIFEROL) 25 MCG (1000 UT) TABS TABLET    Take 1,000 Units by mouth daily       ALLERGIES    is allergic to humalog [insulin lispro]; insulin regular human; and lisinopril. FAMILY HISTORY    He indicated that his mother is . He indicated that his father is . He indicated that his brother is . family history includes Cancer in his mother; Other in his brother. SOCIAL HISTORY     reports that he has been smoking cigarettes. He started smoking about 34 years ago. He has a 5.00 pack-year smoking history. He has never used smokeless tobacco. He reports previous drug use. He reports that he does not drink alcohol. PHYSICAL EXAM      INITIAL VITALS: BP (!) 252/92   Pulse 90   Temp 98 °F (36.7 °C)   Resp 28   Ht 6' 3\" (1.905 m)   Wt 233 lb 7.5 oz (105.9 kg)   SpO2 96%   BMI 29.18 kg/m² Estimated body mass index is 29.18 kg/m² as calculated from the following:    Height as of this encounter: 6' 3\" (1.905 m). Weight as of this encounter: 233 lb 7.5 oz (105.9 kg). Physical Exam  Vitals signs reviewed. Constitutional:       Appearance: He is well-developed. Comments: Patient is sitting in bed in mild distress secondary to shortness of breath   HENT:      Head: Normocephalic and atraumatic.       Right Ear: External ear normal. Left Ear: External ear normal.      Nose: Nose normal.   Eyes:      General: No scleral icterus. Conjunctiva/sclera: Conjunctivae normal.      Pupils: Pupils are equal, round, and reactive to light. Neck:      Musculoskeletal: Normal range of motion and neck supple. Thyroid: No thyromegaly. Vascular: No JVD. Cardiovascular:      Rate and Rhythm: Normal rate and regular rhythm. Heart sounds: Murmur present. Systolic murmur present with a grade of 4/6. No friction rub. Comments: Fistula present to left arm  Pulmonary:      Effort: Pulmonary effort is normal.      Breath sounds: Decreased air movement present. No wheezing or rales. Chest:      Chest wall: No tenderness. Abdominal:      General: Bowel sounds are normal. There is distension. Palpations: Abdomen is soft. There is no mass. Tenderness: There is no abdominal tenderness. Musculoskeletal:      Right lower le+ Edema present. Left lower le+ Edema present. Lymphadenopathy:      Cervical: No cervical adenopathy. Skin:     Findings: No rash. Neurological:      Mental Status: He is alert and oriented to person, place, and time. Psychiatric:         Behavior: Behavior is cooperative. MEDICAL DECISION MAKING    DIFFERENTIAL DIAGNOSIS:  ESRD fluid overload ACS congestive heart failure electrolyte and metabolic disorder DKA pneumonia    DIAGNOSTIC RESULTS    EKG   Interpreted by Familia Rosenberg MD      Rhythm: normal sinus   Rate: normal  Axis: Left axis deviation  Ectopy: none  Conduction: normal  ST Segments: no acute change  T Waves: T wave inversion in the lateral lead  Q Waves: none    Clinical Impression: Normal sinus rhythm, possible left atrial enlargement, left axis deviation, LVH, prolonged QT, T wave inversion in the lateral lead abnormal EKG      Familia Rosenberg MD      RADIOLOGY:  I have reviewed radiologic plain film image(s).   The plain films will be read or overread by the radiologist.All other non-plain film images(s) such as CT, Ultrasound and MRI have been read by the radiologist.  XR CHEST PORTABLE   Final Result   Bilateral pleural effusions and bibasilar airspace opacities likely related to underlying edema. Correlation advised. **This report has been created using voice recognition software. It may contain minor errors which are inherent in voice recognition technology. **      Final report electronically signed by Dr. Verlean Sicard on 7/27/2020 3:40 PM          LABS:   Labs Reviewed   CBC WITH AUTO DIFFERENTIAL - Abnormal; Notable for the following components:       Result Value    RBC 3.72 (*)     Hemoglobin 11.6 (*)     Hematocrit 38.3 (*)     .0 (*)     MCHC 30.3 (*)     RDW-SD 49.3 (*)     Platelets 971 (*)     MPV 9.1 (*)     Lymphocytes Absolute 0.7 (*)     All other components within normal limits   COMPREHENSIVE METABOLIC PANEL W/ REFLEX TO MG FOR LOW K - Abnormal; Notable for the following components:    CREATININE 9.0 (*)     BUN 57 (*)     Potassium reflex Magnesium 6.6 (*)     All other components within normal limits   TROPONIN - Abnormal; Notable for the following components:    Troponin T 0.059 (*)     All other components within normal limits   BRAIN NATRIURETIC PEPTIDE - Abnormal; Notable for the following components:    Pro-BNP 06191.0 (*)     All other components within normal limits   GLOMERULAR FILTRATION RATE, ESTIMATED - Abnormal; Notable for the following components:    Est, Glom Filt Rate 7 (*)     All other components within normal limits   PROTIME-INR   APTT   ANION GAP   OSMOLALITY     All other unresulted laboratory test above are normal:    Vitals:    Vitals:    07/27/20 1436 07/27/20 1501 07/27/20 1629 07/27/20 1656   BP: (!) 207/126 (!) 219/107 (!) 225/120 (!) 252/92   Pulse: 95 96 99 90   Resp: 22 22 23 28   Temp: 98.5 °F (36.9 °C)   98 °F (36.7 °C)   SpO2: 97% 94% 97% 96%   Weight: 220 lb 3.8 oz (99.9 kg)   233 lb 7.5 oz (105.9 kg)   Height: 6' 3\" (1.905 m)          EMERGENCY DEPARTMENT COURSE:    Medications - No data to display    The pt was seen and evaluated by me. Within the department, I observed the pt's vitalsigns to be within acceptable range except for severe hypertension. Laboratory and Radiological studies were performed, results were reviewed with the patient. Within the department, the pt was given oxygen, patient was referred to nephrology services Dr. Azalia Peabody who came to the emergency room and facilitated the patient to go and had his dialysis straight from the emergency room. I observed the pt's condition to remain hemodynamically stable during the duration of their stay. I explained my proposed course of treatment to the pt, and they were amenable to my decision. Patient was admitted to the hospital service for further eval and treatment. CRITICAL CARE:   None. CONSULTS:  Dr. Jolene Santacruz from hospital services    PROCEDURES:  None. FINAL IMPRESSION       1. Shortness of breath    2. Hypertensive urgency    3. Acute on chronic congestive heart failure, unspecified heart failure type ([address]    4. ESRD (end stage renal disease) on dialysis [facility])          DISPOSITION/PLAN  PATIENT REFERRED TO: Patient is admitted to the hospitalist services, Dr. Jolene Santacruz graciously admitted the patient. (Please note that portions of this note were completed with a voice recognition program and electronically transcribed. Efforts were [facility] edit the dictations but occasionally words are mis-transcribed . The transcription may contain errors not detected in proofreading.   This transcription was electronically signed.)     07/27/20 5:45 PM      Ramona Whitten MD      Emergency room physician            Ramona Whitten MD  07/30/20 [id number]

## 2020-07-27 NOTE — ED NOTES
RN called for dialysis order.         [address], [address] [place] [address]  07/27/20 0477

## 2020-07-27 NOTE — FLOWSHEET NOTE
07/27/20 1656 07/27/20 1942   Vital Signs   BP (!) 252/92 (!) 169/95   Temp 98 °F (36.7 °C) 98.7 °F (37.1 °C)   Pulse 90 103   Resp 28 14   SpO2 96 % 97 %   Weight 233 lb 7.5 oz (105.9 kg) 226 lb 10.1 oz (102.8 kg)   Weight Method Standing scale Standing scale   Post-Hemodialysis Assessment   Post-Treatment Procedures  --  Blood returned; Access bleeding time < 10 minutes   Machine Disinfection Process  --  Acid/Vinegar Clean;Heat Disinfect; Exterior Machine Disinfection   Total Liters Processed (l/min)  --  60.4 l/min   Dialyzer Clearance  --  Lightly streaked   Duration of Treatment (minutes)  --  150 minutes   Heparin amount administered during treatment (units)  --  0 units   Hemodialysis Intake (ml)  --  500 ml   Hemodialysis Output (ml)  --  3500 ml   NET Removed (ml)  --  3000 ml   Tolerated Treatment  --  Good   Stable 2.5 hour tx. Removed 3.0L of fluid. pt tolerated fluid removal well. Pressure held to each needle site x 10 min. Drsg dry and intact upon leaving unit. TX record printed for scanning into EMR. Report called to primary RN.

## 2020-07-27 NOTE — ED TRIAGE NOTES
Patient presents to ED with complaints of shortness of breath and high blood pressure. Patient states he is a dialysis patient and was originally scheduled for dialysis tomorrow. Patient called and scheduled for this afternoon due to the SOB but states it continued to get worse throughout the day. Patient states he has not taken his medication yet today. Patient denies any pain or chest tightness. Upon arrival patient walked from stretcher to cot in room. Patient oxygen saturation at 91% on room air. 2L nasal cannula applied. Patient oxygen saturation then increased to 94%. Respirations easy.

## 2020-07-27 NOTE — H&P
History & Physical        Patient:  Omar Pittman  YOB: 1965    MRN: [id number]     Acct: [de-identified]    PCP: Cynthia Goldman MD    Date of Admission: 7/27/2020    Date of Service: Pt seen/examined on 07/27/20  and Admitted to Inpatient with expected LOS greater than two midnights due to medical therapy. Chief Complaint:  SOB      ASSESSMENT / PLAN:    1. Accelerated hypertension. Multifactorial with volume overload and not taking home medications today. Start Cardene drip. Resume PO Clonidine and Cardura. Hold CCB while on Cardene. Monitor BP closely following dialysis. 2. Bilateral plural effusions with pulmonary edema due to volume overload secondary to acute on chronic diastolic heart failure. ProBNP [id number]. Daily weights. Low sodium diet. Strict intake and output. 2 liter fluid restriction. 3. ESRD on HD. Emergent run today for volume overload. per. Dr. Leah Herrera. 4. 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 Aware. 5. Hyperkalemia. Likely to resolve with dialysis. 2 potassium bath ordered per nephrology. 6. Macrocytic anemia. Hbg 11.5 Resume Ferrous Sulfate. 7. Chronically elevated troponin due to CKD. CP free. 8. Secondary hyperparathyroidism due to CKD. Vitamin D.   9. PTSD. 10. Left renal artery stenosis. 11. HX cocaine use. 12. HX AAA. 13. HX chronic ETOH use. PO Vitmains. 14. Medical noncompliance. 15. REZA refusing CPAP. History Of Present Illness:      50 y.o. male who presented to [address] [place] [address] with SOB. 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 Normal dialysis days T/TH/SA. Repots increased oral intake due to excessive thirst over the weekend. SOB started this AM, but became progressively worse so he presented to the ED. CXR with bilateral plural effusions with pulmonary edema. SBP >200. States he did not take his medications today.  No chest pain or dizziness. Has had several admissions for the same issue in the last. Recently discharged 7/8/2020. Past Medical History:          Diagnosis Date    AAA [facility]ab[facility]) ([facility]     NURIS (acute kidney injury) ([facility] [facility] 9/24/2015    Anemia associated with chronic renal failure     Arthritis     stated in hands    Cocaine abuse ([facility] 5/10/2014    Depression     Diabetes mellitus ([facility] [address]     pt states he no longer has diabetes he has lost alot of davion.  FSGS (focal segmental glomerulosclerosis) 5/23/2013    Hemodialysis patient ([facility] [address] 10/17/2016    on hemodialysis with [facility] 1/16/2012    History of blood transfusion     Hyperlipidemia     Hyperphosphatemia 5/21/2016    Hypertension     Left renal artery stenosis ([facility] 5/22/2014    Monoclonal (M) protein disease, multiple 'M' protein     Nicotine dependence 6/16/2014    Noncompliance     Pneumonia     Psychiatric problem     PTSD (post-traumatic stress disorder)     Pt vet from [place]    Secondary hyperparathyroidism (of renal origin)     Sleep apnea        Past Surgical History:          Procedure Laterality Date    ABDOMINAL AORTIC ANEURYSM REPAIR      DIALYSIS FISTULA CREATION Left 07/08/2016    EKG 12-LEAD  9/24/2015         HAND TENDON SURGERY Left 4/5/2019    LEFT THUMB FLEXOR TENDON REPAIR performed by Fabrizio Russo MD at [address] [place] [address] VASCULAR SURGERY Left 07/08/2016    AV Fistula    VASCULAR SURGERY Right 1990    Surgery on Achilles tendon       Medications Prior to Admission:      Prior to Admission medications    Medication Sig Start Date End Date Taking?  Authorizing Provider   minoxidil (LONITEN) 10 MG tablet Take 2 tablets by mouth every 12 hours for 28 days  Patient taking differently: Take 10 mg by mouth 2 times daily  3/13/20 4/10/20  Megan Dotson MD   ferrous sulfate (FE TABS 325) 325 (65 Fe) MG EC tablet Take 1 tablet by mouth 3 times daily (with meals)  Patient taking differently: Take 325 mg by mouth daily (with breakfast)  3/13/20   Diego Beasley MD   nitroGLYCERIN (NITROSTAT) 0.4 MG SL tablet up to max of 3 total doses. If no relief after 1 dose, call 911. 2/26/20   Diego Beasley MD   acetaminophen (TYLENOL) 325 MG tablet Take 650 mg by mouth every 4 hours as needed for Pain or Fever    Historical Provider, MD   B Complex-C-Zn-Folic Acid (DIALYVITE 624-PYDX 15 PO) Take 1 tablet by mouth daily    Historical Provider, MD   verapamil (CALAN SR) 240 MG extended release tablet Take 240 mg by mouth daily    Historical Provider, MD   vitamin D (CHOLECALCIFEROL) 25 MCG (1000 UT) TABS tablet Take 1,000 Units by mouth daily    Historical Provider, MD   cloNIDine (CATAPRES) 0.3 MG tablet Take 1 tablet by mouth 3 times daily 1/28/20 7/7/20  WADE Carrion CNP   doxazosin (CARDURA) 8 MG tablet Take 1 tablet by mouth every 12 hours for 28 days 1/28/20 7/7/20  WADE Carrion CNP   famotidine (PEPCID) 20 MG tablet Take 1 tablet by mouth daily 1/28/20   WADE Carrion CNP   sertraline (ZOLOFT) 100 MG tablet Take 100 mg by mouth daily    Historical Provider, MD   hydrOXYzine (ATARAX) 50 MG tablet Take 50 mg by mouth 3 times daily as needed for Itching    Historical Provider, MD   aspirin 81 MG EC tablet Take 1 tablet by mouth daily 3/23/18   Lavern Herman MD   isosorbide mononitrate (IMDUR) 120 MG extended release tablet Take 1 tablet by mouth daily 3/24/18   Lavern Herman MD   traZODone (DESYREL) 50 MG tablet Take 1 tablet by mouth nightly as needed for Sleep 3/23/18   Lavern Herman MD   atorvastatin (LIPITOR) 40 MG tablet Take 1 tablet by mouth nightly 3/23/18   Lavern Herman MD   fluticasone (FLONASE) 50 MCG/ACT nasal spray 1 spray by Nasal route daily     Historical Provider, MD       Allergies:  Humalog [insulin lispro]; Insulin regular human; and Lisinopril    Social History:       The patient currently lives at home. TOBACCO:   reports that he has been smoking cigarettes. He started smoking about 34 years ago. He has a 5.00 pack-year smoking history. He has never used smokeless tobacco.  ETOH:   reports no history of alcohol use. Family History:      Positive as follows:        Problem Relation Age of Onset    Cancer Mother     Other Brother         aneurysm        Diet:  Renal. Low NA. 2000 ML fluid restriction. REVIEW OF SYSTEMS:   Pertinent positives as noted in the HPI. All other systems reviewed and negative. PHYSICAL EXAM:    BP (!) 252/92   Pulse 90   Temp 98 °F (36.7 °C)   Resp 28   Ht 6' 3\" (1.905 m)   Wt 233 lb 7.5 oz (105.9 kg)   SpO2 96%   BMI 29.18 kg/m²     General appearance:  No apparent distress, appears stated age and cooperative. HEENT:  Normal cephalic, atraumatic without obvious deformity. Pupils equal, round, and reactive to light. Extra ocular muscles intact. Conjunctivae/corneas clear. Neck: Supple, with full range of motion. No jugular venous distention. Trachea midline. Respiratory:  labored respiratory effort. Crackles posteriorly bilaterally. .  Cardiovascular:  Regular rate and rhythm with normal S1/S2 without murmurs, rubs or gallops. Abdomen: Soft, non-tender, non-distended with normal bowel sounds. Musculoskeletal:  No clubbing, cyanosis or edema bilaterally. Full range of motion without deformity. Skin: Skin color, texture, turgor normal.  No rashes or lesions. Neurologic:  Neurovascularly intact without any focal sensory/motor deficits.  Cranial nerves: II-XII intact, grossly non-focal.  Psychiatric:  Alert and oriented, thought content appropriate, normal insight  Capillary Refill: Brisk,< 3 seconds   Peripheral Pulses: +2 palpable, equal bilaterally       Labs:     Recent Labs     07/27/20  1500   WBC 7.6   HGB 11.6*   HCT 38.3*   *     Recent Labs     07/27/20  1500      K 6.6*      CO2 27   BUN 57* CREATININE 9.0*   CALCIUM 8.7     Recent Labs     07/27/20  1500   AST 16   ALT 14   BILITOT 0.3   ALKPHOS 81     Recent Labs     07/27/20  1500   INR 1.00     No results for input(s): Glean Islam in the last 72 hours. Urinalysis:      Lab Results   Component Value Date    NITRU NEGATIVE 04/03/2018    WBCUA 2-4 04/03/2018    BACTERIA NONE 04/03/2018    RBCUA 5-10 04/03/2018    BLOODU SMALL 04/03/2018    SPECGRAV 1.014 03/07/2018    GLUCOSEU NEGATIVE 04/03/2018       Intake & Output:  No intake/output data recorded. No intake/output data recorded. Radiology:       XR CHEST PORTABLE   Final Result   Bilateral pleural effusions and bibasilar airspace opacities likely related to underlying edema. Correlation advised. **This report has been created using voice recognition software. It may contain minor errors which are inherent in voice recognition technology. **      Final report electronically signed by Dr. Becka Carcamo on 7/27/2020 3:40 PM           DVT prophylaxis: Heparin. Code Status: Full code        Disposition:Home        Thank you Du Enriquez MD for the opportunity to be involved in this patient's care.   Electronically signed by WADE Elaine CNP on 7/27/2020 at 6:01 PM

## 2020-07-28 LAB
ABSOLUTE RETIC #: 37 THOU/MM3 (ref 20–115)
ANION GAP SERPL CALCULATED.3IONS-SCNC: 10 MEQ/L (ref 8–16)
BASOPHILS # BLD: 0.5 %
BASOPHILS ABSOLUTE: 0 THOU/MM3 (ref 0–0.1)
BUN BLDV-MCNC: 44 MG/DL (ref 7–22)
CALCIUM SERPL-MCNC: 8.6 MG/DL (ref 8.5–10.5)
CHLORIDE BLD-SCNC: 100 MEQ/L (ref 98–111)
CO2: 27 MEQ/L (ref 23–33)
CREAT SERPL-MCNC: 8.2 MG/DL (ref 0.4–1.2)
EOSINOPHIL # BLD: 3.6 %
EOSINOPHILS ABSOLUTE: 0.2 THOU/MM3 (ref 0–0.4)
ERYTHROCYTE [DISTWIDTH] IN BLOOD BY AUTOMATED COUNT: 13 % (ref 11.5–14.5)
ERYTHROCYTE [DISTWIDTH] IN BLOOD BY AUTOMATED COUNT: 48.9 FL (ref 35–45)
FERRITIN: 1698 NG/ML (ref 22–322)
FOLATE: 15 NG/ML (ref 4.8–24.2)
GFR SERPL CREATININE-BSD FRML MDRD: 8 ML/MIN/1.73M2
GLUCOSE BLD-MCNC: 96 MG/DL (ref 70–108)
HCT VFR BLD CALC: 33.5 % (ref 42–52)
HEMOGLOBIN: 10.2 GM/DL (ref 14–18)
IMMATURE GRANS (ABS): 0.01 THOU/MM3 (ref 0–0.07)
IMMATURE GRANULOCYTES: 0.2 %
IMMATURE RETIC FRACT: 13.7 % (ref 2.3–13.4)
IRON SATURATION: 60 % (ref 20–50)
IRON: 90 UG/DL (ref 65–195)
LYMPHOCYTES # BLD: 14.5 %
LYMPHOCYTES ABSOLUTE: 0.6 THOU/MM3 (ref 1–4.8)
MCH RBC QN AUTO: 31.2 PG (ref 26–33)
MCHC RBC AUTO-ENTMCNC: 30.4 GM/DL (ref 32.2–35.5)
MCV RBC AUTO: 102.4 FL (ref 80–94)
MONOCYTES # BLD: 9.5 %
MONOCYTES ABSOLUTE: 0.4 THOU/MM3 (ref 0.4–1.3)
NUCLEATED RED BLOOD CELLS: 0 /100 WBC
PLATELET # BLD: 125 THOU/MM3 (ref 130–400)
PMV BLD AUTO: 9.9 FL (ref 9.4–12.4)
POTASSIUM REFLEX MAGNESIUM: 5.5 MEQ/L (ref 3.5–5.2)
POTASSIUM SERPL-SCNC: 4.3 MEQ/L (ref 3.5–5.2)
RBC # BLD: 3.27 MILL/MM3 (ref 4.7–6.1)
RETIC HEMOGLOBIN: 36.6 PG (ref 28.2–35.7)
RETICULOCYTE ABSOLUTE COUNT: 1.1 % (ref 0.5–2)
SEG NEUTROPHILS: 71.7 %
SEGMENTED NEUTROPHILS ABSOLUTE COUNT: 3.2 THOU/MM3 (ref 1.8–7.7)
SODIUM BLD-SCNC: 137 MEQ/L (ref 135–145)
TOTAL IRON BINDING CAPACITY: 150 UG/DL (ref 171–450)
VITAMIN B-12: 1067 PG/ML (ref 211–911)
WBC # BLD: 4.4 THOU/MM3 (ref 4.8–10.8)

## 2020-07-28 PROCEDURE — 6370000000 HC RX 637 (ALT 250 FOR IP): Performed by: HOSPITALIST

## 2020-07-28 PROCEDURE — 6360000002 HC RX W HCPCS: Performed by: NURSE PRACTITIONER

## 2020-07-28 PROCEDURE — 82746 ASSAY OF FOLIC ACID SERUM: CPT

## 2020-07-28 PROCEDURE — 80048 BASIC METABOLIC PNL TOTAL CA: CPT

## 2020-07-28 PROCEDURE — 90935 HEMODIALYSIS ONE EVALUATION: CPT | Performed by: INTERNAL MEDICINE

## 2020-07-28 PROCEDURE — 99233 SBSQ HOSP IP/OBS HIGH 50: CPT | Performed by: HOSPITALIST

## 2020-07-28 PROCEDURE — 83540 ASSAY OF IRON: CPT

## 2020-07-28 PROCEDURE — 85025 COMPLETE CBC W/AUTO DIFF WBC: CPT

## 2020-07-28 PROCEDURE — 90935 HEMODIALYSIS ONE EVALUATION: CPT

## 2020-07-28 PROCEDURE — 94760 N-INVAS EAR/PLS OXIMETRY 1: CPT

## 2020-07-28 PROCEDURE — 83550 IRON BINDING TEST: CPT

## 2020-07-28 PROCEDURE — 36415 COLL VENOUS BLD VENIPUNCTURE: CPT

## 2020-07-28 PROCEDURE — 82728 ASSAY OF FERRITIN: CPT

## 2020-07-28 PROCEDURE — 6370000000 HC RX 637 (ALT 250 FOR IP): Performed by: NURSE PRACTITIONER

## 2020-07-28 PROCEDURE — 84132 ASSAY OF SERUM POTASSIUM: CPT

## 2020-07-28 PROCEDURE — 82607 VITAMIN B-12: CPT

## 2020-07-28 PROCEDURE — 2060000000 HC ICU INTERMEDIATE R&B

## 2020-07-28 PROCEDURE — 85046 RETICYTE/HGB CONCENTRATE: CPT

## 2020-07-28 PROCEDURE — 2580000003 HC RX 258: Performed by: NURSE PRACTITIONER

## 2020-07-28 RX ORDER — THIAMINE MONONITRATE (VIT B1) 100 MG
100 TABLET ORAL DAILY
Status: DISCONTINUED | OUTPATIENT
Start: 2020-07-28 | End: 2020-07-30 | Stop reason: HOSPADM

## 2020-07-28 RX ORDER — SODIUM CHLORIDE 0.9 % (FLUSH) 0.9 %
10 SYRINGE (ML) INJECTION PRN
Status: CANCELLED | OUTPATIENT
Start: 2020-07-28

## 2020-07-28 RX ORDER — SODIUM CHLORIDE 0.9 % (FLUSH) 0.9 %
10 SYRINGE (ML) INJECTION EVERY 12 HOURS SCHEDULED
Status: CANCELLED | OUTPATIENT
Start: 2020-07-28

## 2020-07-28 RX ORDER — LANOLIN ALCOHOL/MO/W.PET/CERES
1000 CREAM (GRAM) TOPICAL DAILY
Status: DISCONTINUED | OUTPATIENT
Start: 2020-07-28 | End: 2020-07-30 | Stop reason: HOSPADM

## 2020-07-28 RX ORDER — FOLIC ACID 1 MG/1
1 TABLET ORAL DAILY
Status: DISCONTINUED | OUTPATIENT
Start: 2020-07-28 | End: 2020-07-30 | Stop reason: HOSPADM

## 2020-07-28 RX ORDER — M-VIT,TX,IRON,MINS/CALC/FOLIC 27MG-0.4MG
1 TABLET ORAL DAILY
Status: DISCONTINUED | OUTPATIENT
Start: 2020-07-28 | End: 2020-07-30 | Stop reason: HOSPADM

## 2020-07-28 RX ADMIN — CLONIDINE HYDROCHLORIDE 0.3 MG: 0.2 TABLET ORAL at 22:33

## 2020-07-28 RX ADMIN — ASPIRIN 81 MG: 81 TABLET ORAL at 14:21

## 2020-07-28 RX ADMIN — FERROUS SULFATE TAB 325 MG (65 MG ELEMENTAL FE) 325 MG: 325 (65 FE) TAB at 14:21

## 2020-07-28 RX ADMIN — Medication 100 MG: at 14:21

## 2020-07-28 RX ADMIN — DOXAZOSIN 8 MG: 4 TABLET ORAL at 06:42

## 2020-07-28 RX ADMIN — MINOXIDIL 10 MG: 2.5 TABLET ORAL at 06:42

## 2020-07-28 RX ADMIN — VITAMIN D 1000 UNITS: 25 TAB ORAL at 14:21

## 2020-07-28 RX ADMIN — CLONIDINE HYDROCHLORIDE 0.3 MG: 0.2 TABLET ORAL at 06:42

## 2020-07-28 RX ADMIN — PANTOPRAZOLE SODIUM 40 MG: 40 TABLET, DELAYED RELEASE ORAL at 14:21

## 2020-07-28 RX ADMIN — ATORVASTATIN CALCIUM 40 MG: 40 TABLET, FILM COATED ORAL at 20:35

## 2020-07-28 RX ADMIN — HEPARIN SODIUM 5000 UNITS: 5000 INJECTION INTRAVENOUS; SUBCUTANEOUS at 22:32

## 2020-07-28 RX ADMIN — FOLIC ACID 1 MG: 1 TABLET ORAL at 14:22

## 2020-07-28 RX ADMIN — SODIUM CHLORIDE, PRESERVATIVE FREE 10 ML: 5 INJECTION INTRAVENOUS at 20:36

## 2020-07-28 RX ADMIN — SERTRALINE HYDROCHLORIDE 100 MG: 100 TABLET, FILM COATED ORAL at 14:21

## 2020-07-28 RX ADMIN — DOXAZOSIN 8 MG: 4 TABLET ORAL at 20:35

## 2020-07-28 RX ADMIN — ISOSORBIDE MONONITRATE 120 MG: 60 TABLET ORAL at 06:42

## 2020-07-28 RX ADMIN — HEPARIN SODIUM 5000 UNITS: 5000 INJECTION INTRAVENOUS; SUBCUTANEOUS at 05:28

## 2020-07-28 RX ADMIN — MULTIPLE VITAMINS W/ MINERALS TAB 1 TABLET: TAB at 14:22

## 2020-07-28 RX ADMIN — HEPARIN SODIUM 5000 UNITS: 5000 INJECTION INTRAVENOUS; SUBCUTANEOUS at 14:25

## 2020-07-28 RX ADMIN — MINOXIDIL 10 MG: 2.5 TABLET ORAL at 23:50

## 2020-07-28 RX ADMIN — Medication 1000 MCG: at 14:22

## 2020-07-28 RX ADMIN — SODIUM CHLORIDE, PRESERVATIVE FREE 10 ML: 5 INJECTION INTRAVENOUS at 14:24

## 2020-07-28 ASSESSMENT — PAIN SCALES - GENERAL
PAINLEVEL_OUTOF10: 0

## 2020-07-28 NOTE — FLOWSHEET NOTE
07/27/20 2101   Provider Notification   Reason for Communication Evaluate   Provider Name Ana Lilia Osullivan   Provider Notification Advance Practice Clinician (CNS, NP, CNM, CRNA, PA)   Method of Communication Secure Message   Response No new orders   Notification Time 2101     Secure message sent. Hold cardene drip and monitor BP overnight.

## 2020-07-28 NOTE — PROGRESS NOTES
Pt admitted to  4K28 from ED. Complaints: Shortness of breath and hypertension. IV none infusing into the antecubital right, condition patent and no redness. IV site free of s/s of infection or infiltration. Vital signs obtained. Assessment and data collection initiated. Two nurse skin assessment performed by Tha Narayanan and Ely HAQUE. Oriented to room. Policies and procedures for 4K explained. All questions answered with no further questions at this time. Fall prevention and safety brochure discussed with patient. Bed alarm on. Call light in reach. Would you like your Primary Care Physician notified?  no   The best day to schedule a follow up Dr appointment is:  Monday a.m. Explained patients right to have family, representative or physician notified of their admission. Patient has Declined for physician to be notified. Patient has Declined for family/representative to be notified.

## 2020-07-28 NOTE — PROGRESS NOTES
Hospitalist Progress Note    Patient:  Genevieve Mohan      Unit/Bed:4K-28/028-A    YOB: 1965    MRN: [id number]       Acct: [de-identified]     PCP: Gretchen Juarez MD    Date of Admission: 7/27/2020    Active Hospital Problems    Diagnosis Date Noted    Accelerated hypertension [I10] 08/06/2018       Assessment/Plan:    1. Accelerated hypertension. BP max 252/92. Multifactorial likely 2/2  not taking home medications and possibly volume overload. No end-organ damage. Noted started on Nicardapine drip w/ BP in 140s now; also pt started on quadruple antihypertensive therapy; d/c'ed drip and placed hold parameters on home meds w/ aim to avoid overcorrection. Will monitor BP closely. RN aware  2. Bilateral plural effusions with pulmonary edema due to volume overload secondary to acute on chronic diastolic heart failure d/t missed HD. ProBNP [id number]. Daily weights. Low sodium diet. Strict intake and output. 2 liter fluid restriction. 3. ESRD 2/2 FSGS on IHD. Known to nephro; received IHD yesterday and will today. nephro following  4. Hyperkalemia. K 6.6. on arrival, improved w/ HD. K 5.5 today. Likely to resolve with dialysis  5. Chronic Macrocytic anemia. Hbg stable at target given ESRD; multiofactorial; noted BRISEIDA on anemia W/U from March 2020 already on Ferrous Sulfate. However, also noted marginally low levels of vit B12; started on cyanocobalamin while repeat Anemia C\W/U.  6. Chronically elevated troponin due to CKD. Denies CP. 7. Secondary hyperparathyroidism due to CKD. 8. Left renal artery stenosis. 9. Hx of cocaine use. Counseling offered; avoid BB  10. Hx of AAA. 11. Hx of chronic ETOH use: will clarify last drink? No WD syndrome;  will place on phenobarb prophylaxis protocol. Added folic acid/thiamin and MV  12. Medical noncompliance. counseling offered  13. Hx of REZA refusing CPAP.        Expected discharge date:  TBD         Disposition:      [] Home [] TCU                             [] Rehab                             [] Psych                             [] SNF                             [] [place]                             [] Other-    Chief Complaint:   Chief Complaint   Patient presents with    Shortness of [address] Course: Patient was seen, examined and the medical chart was reviewed thoroughly today. In summary, 50 y.o.male admitted on 7/27/2020 for management of accelerated HTN. nephro also following. I covered care on 7/28. Subjective (past 24 hours):   no new issues overnight. Denies CP/SOB/headache/fever/chills      Medications:  Reviewed    Infusion Medications   Scheduled Medications    aspirin  81 mg Oral Daily    atorvastatin  40 mg Oral Nightly    cloNIDine  0.3 mg Oral TID    doxazosin  8 mg Oral 2 times per day    pantoprazole  40 mg Oral Daily    ferrous sulfate  325 mg Oral Daily with breakfast    isosorbide mononitrate  120 mg Oral Daily    minoxidil  10 mg Oral BID    sertraline  100 mg Oral Daily    Vitamin D  1,000 Units Oral Daily    sodium chloride flush  10 mL Intravenous 2 times per day    heparin (porcine)  5,000 Units Subcutaneous 3 times per day     PRN Meds: hydrOXYzine, traZODone, sodium chloride flush, acetaminophen **OR** acetaminophen, polyethylene glycol, promethazine **OR** ondansetron      Intake/Output Summary (Last 24 hours) at 7/28/2020 0828  Last data filed at 7/28/2020 0356  Gross per 24 hour   Intake 610 ml   Output 3500 ml   Net -2890 ml       Diet:  DIET RENAL; Low Sodium (2 GM); Daily Fluid Restriction: 2000 ml    Exam:  BP (!) 141/75   Pulse 96   Temp 97.9 °F (36.6 °C)   Resp 18   Ht 6' 3\" (1.905 m)   Wt 225 lb 12 oz (102.4 kg)   SpO2 94%   BMI 28.22 kg/m²     General appearance: A&O x3, Not ill or toxic, in no apparent distress  HEENT:  GREGORY  EOM intact. Neck: Supple, with full range of motion. No jugular venous distention.  Trachea midline. Respiratory:   NL A/E bilat with no adventitious sounds   Cardiovascular:  normal S1/S2 with no murmurs/gallops  Abdomen: Soft, non-tender, non-distended, no rigidity or peritoneal signs  Musculoskeletal: NL symmetrical A/PROM bilat U/L extremities   Skin: No rashes. No edema  Neurologic:  CN II-XII intact. NL symmetrical reflexes. NL gait and stance. NL Cerebellar exam. Power 5/5 all muscle groups U/L extremities. Toes downgoing  Capillary Refill: Brisk,< 3 seconds   Peripheral Pulses: +2 palpable, equal bilaterally        Labs:   Recent Labs     07/27/20  1500 07/28/20  0557   WBC 7.6 4.4*   HGB 11.6* 10.2*   HCT 38.3* 33.5*   * 125*     Recent Labs     07/27/20  1500 07/28/20  0557    137   K 6.6* 5.5*    100   CO2 27 27   BUN 57* 44*   CREATININE 9.0* 8.2*   CALCIUM 8.7 8.6     Recent Labs     07/27/20  1500   AST 16   ALT 14   BILITOT 0.3   ALKPHOS 81     Recent Labs     07/27/20  1500   INR 1.00     No results for input(s): CKTOTAL, TROPONINI in the last 72 hours. Urinalysis:      Lab Results   Component Value Date    NITRU NEGATIVE 04/03/2018    WBCUA 2-4 04/03/2018    BACTERIA NONE 04/03/2018    RBCUA 5-10 04/03/2018    BLOODU SMALL 04/03/2018    SPECGRAV 1.014 03/07/2018    GLUCOSEU NEGATIVE 04/03/2018       Radiology:  Xr Chest Portable    Result Date: 7/27/2020  PROCEDURE: XR CHEST PORTABLE CLINICAL INFORMATION: shortness of breath. COMPARISON: July 6, 2020. TECHNIQUE: Portable FINDINGS: Postsurgical material in the left lateral neck. Bilateral pleural effusions. Cardiomegaly. Ectatic thoracic aorta. Aortic arch and descending aorta stent graft. Bilateral airspace opacities likely edema. Bilateral pleural effusions and bibasilar airspace opacities likely related to underlying edema. Correlation advised. **This report has been created using voice recognition software. It may contain minor errors which are inherent in voice recognition technology. ** Final report electronically signed by Dr. Cait Ken on 7/27/2020 3:40 PM    Xr Chest Portable    Result Date: 7/6/2020  PROCEDURE: XR CHEST PORTABLE CLINICAL INFORMATION: sob, needs dialysis. COMPARISON: March 12, 2020 TECHNIQUE: Portable FINDINGS: Bilateral airspace opacities and pleural effusions concerning for edema or pneumonia. There is a thoracic aorta stent graft similar in appearance to prior study. Ectatic thoracic aorta. Mild cardiomegaly. No acute osseous findings. Bilateral airspace opacities and pleural effusions concerning for edema or pneumonia **This report has been created using voice recognition software. It may contain minor errors which are inherent in voice recognition technology. ** Final report electronically signed by Dr. Cait Ken on 7/6/2020 7:53 PM      Diet: DIET RENAL; Low Sodium (2 GM); Daily Fluid Restriction: 2000 ml    DVT prophylaxis: [] Lovenox                                 [] SCDs                                 [x] SQ Heparin                                 [] Encourage ambulation           [] Already on Anticoagulation       Code Status: Full Code      Active Hospital Problems    Diagnosis Date Noted    Accelerated hypertension [I10] 08/06/2018           With RN in room, patient was updated about the treatment plan, all the questions and concerns were addressed.         Electronically signed by Earnest Leavitt MD on 7/28/2020 at 8:57 AM

## 2020-07-28 NOTE — PROGRESS NOTES
Renal Progress Note    Assessment and Plan:    1. End stage kidney disease on hemodialysis. 2.  Volume overload  3. Hyperkalemia  4. Hypertension primary  5. Elevated troponin level of uncertain significant  6. Macrocytic anemia  7. Thrombocytopenia  8.   Diabetes mellitus type 2  PLAN:  Potassium level pending this morning  Medications reviewed  No changes  Hemodialysis with ultrafiltration this morning  Discussed with dialysis staff  Discussed with the floor staff  We will follow      Patient Active Problem List:     FSGS (focal segmental glomerulosclerosis)     Diabetes mellitus type 2, controlled ([address]     Monoclonal (M) protein disease, multiple 'M' protein     Depression     PTSD (post-traumatic stress disorder)     Secondary renal hyperparathyroidism ([address]     Cocaine use     Substance induced mood disorder ([address]     Renal artery stenosis (HCC) with uncontrollable hypertension     Chronic alcoholism (HCC)     Severe cocaine use disorder (HCC)     Essential hypertension     Uncontrolled hypertension     LVH (left ventricular hypertrophy) due to hypertensive disease     Acute on chronic diastolic CHF (congestive heart failure) (HCC)     REZA (obstructive sleep apnea)     Headache, unspecified headache type     FH: HTN (hypertension)     Hypertrophic cardiomyopathy ([address]     Diet-controlled diabetes mellitus ([address]     A-V fistula ([address]     Chronic thoracic aortic dissection (HCC)     Hyperphosphatemia     Anemia in CKD (chronic kidney disease)     Vitamin D deficiency     Thrombocytopenia (HCC)     Personality disorder (HCC)     Tobacco abuse     Metabolic acidosis     Pneumonia due to organism     Precordial pain     ESRD on hemodialysis (HCC)     Edema of upper extremity     ESRD (end stage renal disease) ([address]     Hyperglycemia     Cocaine abuse, continuous - reports spending $100 on cocaine \"every other day\"     Homelessness     Moderate episode of recurrent major depressive disorder ([address] Noncompliance of patient with renal dialysis ([facility]     Anemia associated with stage 5 chronic renal failure ([facility]     Hypertensive emergency     Cocaine use disorder, severe, dependence ([facility]     Alcohol use disorder, severe, dependence ([facility]     Abdominal aortic aneurysm (AAA) without rupture (HCC)     Atrial flutter with rapid ventricular response (HCC)     PVD (peripheral vascular disease) (HCC)     Dyslipidemia     Other fluid overload     Accelerated hypertension     Laceration of flexor muscle, fascia and tendon of left thumb at forearm level, initial encounter     Acute respiratory failure with hypoxia (HCC)     Acute pulmonary edema (HCC)     Macrocytic anemia     History of aortic aneurysm repair     Medical non-compliance     Volume overload     Chest pain     Dyspnea     Pancytopenia (HCC)     Acute febrile illness     Hyponatremia     Hypermagnesemia     History of atrial flutter     History of alcohol abuse     History of cocaine abuse (HCC)     Mild tricuspid regurgitation     Uremia, acute      Subjective:   Admit Date: 7/27/2020    Interval History:   Seen for end stage medial volume overload  Awake and alert this morning  No complaint  Updated by the staff  No issues of concern  Blood pressure is better      Medications:   Scheduled Meds:   aspirin  81 mg Oral Daily    atorvastatin  40 mg Oral Nightly    cloNIDine  0.3 mg Oral TID    doxazosin  8 mg Oral 2 times per day    pantoprazole  40 mg Oral Daily    ferrous sulfate  325 mg Oral Daily with breakfast    isosorbide mononitrate  120 mg Oral Daily    minoxidil  10 mg Oral BID    sertraline  100 mg Oral Daily    Vitamin D  1,000 Units Oral Daily    sodium chloride flush  10 mL Intravenous 2 times per day    heparin (porcine)  5,000 Units Subcutaneous 3 times per day     Continuous Infusions:   niCARdipine Stopped (07/27/20 2212)       CBC:   Recent Labs     07/27/20  1500 07/28/20  0557   WBC 7.6 4.4*   HGB 11.6* 10.2*   * 125* CMP:    Recent Labs     07/27/20  1500 07/28/20  0557    137   K 6.6* 5.5*    100   CO2 27 27   BUN 57* 44*   CREATININE 9.0* 8.2*   GLUCOSE 92 96   CALCIUM 8.7 8.6   LABGLOM 7* 8*     Troponin: No results for input(s): TROPONINI in the last 72 hours. BNP: No results for input(s): BNP in the last 72 hours. INR:   Recent Labs     07/27/20  1500   INR 1.00     Lipids: No results for input(s): CHOL, LDLDIRECT, TRIG, HDL, AMYLASE, LIPASE in the last 72 hours. Liver:   Recent Labs     07/27/20  1500   AST 16   ALT 14   ALKPHOS 81   PROT 7.5   LABALBU 3.8   BILITOT 0.3     Iron:  No results for input(s): IRONS, FERRITIN in the last 72 hours. Invalid input(s): LABIRONS  XR CHEST PORTABLE   Final Result   Bilateral pleural effusions and bibasilar airspace opacities likely related to underlying edema. Correlation advised. **This report has been created using voice recognition software. It may contain minor errors which are inherent in voice recognition technology. **      Final report electronically signed by Dr. Riley Santiago on 7/27/2020 3:40 PM            Objective:   Vitals: BP (!) 156/84   Pulse 92   Temp 97.8 °F (36.6 °C) (Oral)   Resp 18   Ht 6' 3\" (1.905 m)   Wt 225 lb 14.4 oz (102.5 kg)   SpO2 91%   BMI 28.24 kg/m²    Wt Readings from Last 3 Encounters:   07/27/20 225 lb 14.4 oz (102.5 kg)   07/07/20 216 lb 11.4 oz (98.3 kg)   03/13/20 223 lb 1.7 oz (101.2 kg)      24HR INTAKE/OUTPUT:      Intake/Output Summary (Last 24 hours) at 7/28/2020 0715  Last data filed at 7/28/2020 0356  Gross per 24 hour   Intake 610 ml   Output 3500 ml   Net -2890 ml       Constitutional:  Alert, awake, no apparent distress   Skin:normal with no rash or lesions. HEENT:Pupils are reactive . Throat is clear . Oral mucosa is moist   Neck:supple with no thyromegaly or carotid bruit  Cardiovascular:  S1, S2 without murmur or rubs   Respiratory: Bilateral crackles  Abdomen: +bs, soft, no tenderness to

## 2020-07-28 NOTE — CARE COORDINATION
20, 7:16 AM EDT  DISCHARGE PLANNING EVALUATION:    Steven Sommers       Admitted from: ER 2020/ 2505 [address] day: 1   Location: -A Reason for admit: Accelerated hypertension [I10] Status: Inpt  Admit order signed?: yes  PMH:  has a past medical history of AAA (abdominal aortic aneurysm) ([facility] NURIS (acute kidney injury) ([facility] Anemia associated with chronic renal failure, Arthritis, Cocaine abuse ([address] Depression, Diabetes mellitus ([address] FSGS (focal segmental glomerulosclerosis), Hemodialysis patient ([facility] Hemorrhoids, History of blood transfusion, Hyperlipidemia, Hyperphosphatemia, Hypertension, Left renal artery stenosis ([address] Monoclonal (M) protein disease, multiple 'M' protein, Nicotine dependence, Noncompliance, Pneumonia, Psychiatric problem, PTSD (post-traumatic stress disorder), Secondary hyperparathyroidism (of renal origin), and Sleep apnea. Procedure: 20 HD 3L off. Pertinent abnormal Imagin20     Bilateral pleural effusions and bibasilar airspace opacities likely related to underlying edema. Correlation advised. Medications:  Scheduled Meds:   aspirin  81 mg Oral Daily    atorvastatin  40 mg Oral Nightly    cloNIDine  0.3 mg Oral TID    doxazosin  8 mg Oral 2 times per day    pantoprazole  40 mg Oral Daily    ferrous sulfate  325 mg Oral Daily with breakfast    isosorbide mononitrate  120 mg Oral Daily    minoxidil  10 mg Oral BID    sertraline  100 mg Oral Daily    Vitamin D  1,000 Units Oral Daily    sodium chloride flush  10 mL Intravenous 2 times per day    heparin (porcine)  5,000 Units Subcutaneous 3 times per day     Continuous Infusions:   niCARdipine Stopped (20 2212)      Pertinent Info/Orders/Treatment Plan: To ER with volume overload and hypertension. 200's/100's. Cardene gtt. Telemetry. Consult to Nephrology. Follows labs. HD yesterday and will repeat today. Diet: DIET RENAL; Low Sodium (2 GM);  Daily Fluid Restriction: 2000 ml Smoking status:  reports that he has been smoking cigarettes. He started smoking about 34 years ago. He has a 5.00 pack-year smoking history. He has never used smokeless tobacco.   PCP: Booker Douglas MD  Readmission 30 days or less: Yes. Discharged 7-7-20  Readmission Risk Score: 42%    Discharge Planning Evaluation  Current Residence:  Private Residence  Living Arrangements:  Spouse/Significant Other   Support Systems:  Spouse/Significant Other, Family Members, Children  Current Services PTA:     Potential Assistance Needed:  N/A  Potential Assistance Purchasing Medications:  No  Does patient want to participate in local refill/ meds to beds program?  No  Type of Home Care Services:  None  Patient expects to be discharged to:  Home w/ spouse  Expected Discharge date:  07/30/20  Follow Up Appointment: Best Day/ Time: Monday AM    Patient Goals/Plan/Treatment Preferences: Met with pt upon his return from HD today. He is from with spouse and they are mostly self sufficient. However, he verbalizes that his wife often needs to leave to tend to her 80year old mother and that leaves him at home to fend for himself. He states he has no home services, DME or difficulty getting meds or basic needs. He has a PCP and he is able to drive. He denies discharge needs at present time. He states also, that he does not wish to be transported to a [address] [facility] facility. Facesheet faxed to PAOLA Pathak. Transportation/Food Security/Housekeeping Addressed:  No issues identified.     Evaluation: no

## 2020-07-28 NOTE — FLOWSHEET NOTE
07/28/20 0833 07/28/20 1330   Vital Signs   BP (!) 141/75 126/63   Temp 97.9 °F (36.6 °C) 97.7 °F (36.5 °C)   Pulse 96 106   Resp 18 18   SpO2 94 % 94 %   Weight 225 lb 12 oz (102.4 kg) 214 lb 8.1 oz (97.3 kg)   Post-Hemodialysis Assessment   Post-Treatment Procedures  --  Blood returned; Access bleeding time < 10 minutes   Machine Disinfection Process  --  Acid/Vinegar Clean;Heat Disinfect; Exterior Machine Disinfection   Total Liters Processed (l/min)  --  125.3 l/min   Dialyzer Clearance  --  Lightly streaked   Duration of Treatment (minutes)  --  270 minutes   Hemodialysis Intake (ml)  --  400 ml   Hemodialysis Output (ml)  --  4900 ml   NET Removed (ml)  --  4500 ml   Tolerated Treatment  --  Good   4.5hr tx completed without complications. Tolerated 4.5L fluid removal well. Pressure held to each site x10 min. Dressing clean dry and intact upon leaving the unit. Report called to primary nurse. tx to be scanned into EMR.

## 2020-07-29 LAB
ANION GAP SERPL CALCULATED.3IONS-SCNC: 11 MEQ/L (ref 8–16)
BUN BLDV-MCNC: 41 MG/DL (ref 7–22)
CALCIUM SERPL-MCNC: 9 MG/DL (ref 8.5–10.5)
CHLORIDE BLD-SCNC: 101 MEQ/L (ref 98–111)
CO2: 25 MEQ/L (ref 23–33)
CREAT SERPL-MCNC: 6.9 MG/DL (ref 0.4–1.2)
GFR SERPL CREATININE-BSD FRML MDRD: 10 ML/MIN/1.73M2
GLUCOSE BLD-MCNC: 136 MG/DL (ref 70–108)
MRSA SCREEN: NORMAL
PHOSPHORUS: 3.7 MG/DL (ref 2.4–4.7)
POTASSIUM REFLEX MAGNESIUM: 5.3 MEQ/L (ref 3.5–5.2)
SODIUM BLD-SCNC: 137 MEQ/L (ref 135–145)

## 2020-07-29 PROCEDURE — 2580000003 HC RX 258: Performed by: NURSE PRACTITIONER

## 2020-07-29 PROCEDURE — 6370000000 HC RX 637 (ALT 250 FOR IP): Performed by: NURSE PRACTITIONER

## 2020-07-29 PROCEDURE — 6370000000 HC RX 637 (ALT 250 FOR IP): Performed by: HOSPITALIST

## 2020-07-29 PROCEDURE — 99233 SBSQ HOSP IP/OBS HIGH 50: CPT | Performed by: HOSPITALIST

## 2020-07-29 PROCEDURE — 99232 SBSQ HOSP IP/OBS MODERATE 35: CPT | Performed by: NURSE PRACTITIONER

## 2020-07-29 PROCEDURE — 80048 BASIC METABOLIC PNL TOTAL CA: CPT

## 2020-07-29 PROCEDURE — 36415 COLL VENOUS BLD VENIPUNCTURE: CPT

## 2020-07-29 PROCEDURE — 84100 ASSAY OF PHOSPHORUS: CPT

## 2020-07-29 PROCEDURE — 2060000000 HC ICU INTERMEDIATE R&B

## 2020-07-29 PROCEDURE — 6360000002 HC RX W HCPCS: Performed by: NURSE PRACTITIONER

## 2020-07-29 RX ORDER — SEVELAMER CARBONATE 800 MG/1
800 TABLET, FILM COATED ORAL
Status: DISCONTINUED | OUTPATIENT
Start: 2020-07-29 | End: 2020-07-30 | Stop reason: HOSPADM

## 2020-07-29 RX ORDER — VERAPAMIL HYDROCHLORIDE 240 MG/1
240 TABLET, FILM COATED, EXTENDED RELEASE ORAL DAILY
Status: DISCONTINUED | OUTPATIENT
Start: 2020-07-30 | End: 2020-07-30 | Stop reason: HOSPADM

## 2020-07-29 RX ORDER — SODIUM POLYSTYRENE SULFONATE 15 G/60ML
15 SUSPENSION ORAL; RECTAL ONCE
Status: COMPLETED | OUTPATIENT
Start: 2020-07-29 | End: 2020-07-29

## 2020-07-29 RX ADMIN — PANTOPRAZOLE SODIUM 40 MG: 40 TABLET, DELAYED RELEASE ORAL at 09:40

## 2020-07-29 RX ADMIN — FERROUS SULFATE TAB 325 MG (65 MG ELEMENTAL FE) 325 MG: 325 (65 FE) TAB at 09:41

## 2020-07-29 RX ADMIN — SERTRALINE HYDROCHLORIDE 100 MG: 100 TABLET, FILM COATED ORAL at 09:40

## 2020-07-29 RX ADMIN — HEPARIN SODIUM 5000 UNITS: 5000 INJECTION INTRAVENOUS; SUBCUTANEOUS at 05:38

## 2020-07-29 RX ADMIN — SODIUM CHLORIDE, PRESERVATIVE FREE 10 ML: 5 INJECTION INTRAVENOUS at 09:43

## 2020-07-29 RX ADMIN — VITAMIN D 1000 UNITS: 25 TAB ORAL at 09:40

## 2020-07-29 RX ADMIN — HEPARIN SODIUM 5000 UNITS: 5000 INJECTION INTRAVENOUS; SUBCUTANEOUS at 14:02

## 2020-07-29 RX ADMIN — ISOSORBIDE MONONITRATE 120 MG: 60 TABLET ORAL at 09:41

## 2020-07-29 RX ADMIN — DOXAZOSIN 8 MG: 4 TABLET ORAL at 09:41

## 2020-07-29 RX ADMIN — DOXAZOSIN 8 MG: 4 TABLET ORAL at 20:50

## 2020-07-29 RX ADMIN — MINOXIDIL 10 MG: 2.5 TABLET ORAL at 20:50

## 2020-07-29 RX ADMIN — CLONIDINE HYDROCHLORIDE 0.3 MG: 0.2 TABLET ORAL at 14:02

## 2020-07-29 RX ADMIN — CLONIDINE HYDROCHLORIDE 0.3 MG: 0.2 TABLET ORAL at 20:50

## 2020-07-29 RX ADMIN — Medication 1000 MCG: at 09:40

## 2020-07-29 RX ADMIN — HEPARIN SODIUM 5000 UNITS: 5000 INJECTION INTRAVENOUS; SUBCUTANEOUS at 20:49

## 2020-07-29 RX ADMIN — ATORVASTATIN CALCIUM 40 MG: 40 TABLET, FILM COATED ORAL at 20:49

## 2020-07-29 RX ADMIN — Medication 15 G: at 16:24

## 2020-07-29 RX ADMIN — ASPIRIN 81 MG: 81 TABLET ORAL at 09:40

## 2020-07-29 RX ADMIN — MULTIPLE VITAMINS W/ MINERALS TAB 1 TABLET: TAB at 09:40

## 2020-07-29 RX ADMIN — Medication 100 MG: at 09:40

## 2020-07-29 RX ADMIN — SODIUM CHLORIDE, PRESERVATIVE FREE 10 ML: 5 INJECTION INTRAVENOUS at 20:49

## 2020-07-29 RX ADMIN — FOLIC ACID 1 MG: 1 TABLET ORAL at 09:41

## 2020-07-29 RX ADMIN — MINOXIDIL 10 MG: 2.5 TABLET ORAL at 09:40

## 2020-07-29 RX ADMIN — CLONIDINE HYDROCHLORIDE 0.3 MG: 0.2 TABLET ORAL at 09:41

## 2020-07-29 ASSESSMENT — PAIN SCALES - GENERAL
PAINLEVEL_OUTOF10: 0

## 2020-07-29 NOTE — PROGRESS NOTES
Nephrology Progress Note    Patient - Vega Cotto   MRN -  [id number]   Oj # - [de-identified]      - 1965    50 y.o. Admit Date: 2020  Hospital Day: 2  Location: [facility]  Date of evaluation -  2020    Subjective:   CC: shortness of breath   Denies shortness of breath on Room air   Feels good, BP improved  Hemodialysis with 4 L Ultrafiltration   BP Range: Systolic (67VXV), NYF:615 , Min:133 , JQR:542      Diastolic (97VUD), VQF:37, Min:62, Max:93    Objective:   VITALS:  BP (!) 144/63   Pulse 101   Temp 99 °F (37.2 °C) (Oral)   Resp 18   Ht 6' 3\" (1.905 m)   Wt 214 lb 8.1 oz (97.3 kg)   SpO2 93%   BMI 26.81 kg/m²    Patient Vitals for the past 24 hrs:   BP Temp Temp src Pulse Resp SpO2   20 1137 (!) 144/63 99 °F (37.2 °C) Oral 101 18 93 %   20 0930 (!) 165/82 98.6 °F (37 °C) Oral 99 18 96 %   20 0332 (!) 173/82 98.3 °F (36.8 °C) Oral 93 18 94 %   20 2322 (!) 174/93 98.7 °F (37.1 °C) Oral 99 16 94 %   20 2227 (!) 164/83 -- -- 105 -- --   20 (!) 142/82 99.1 °F (37.3 °C) Oral 101 18 92 %   20 1548 (!) 143/76 98.7 °F (37.1 °C) Oral 104 21 95 %   20 1417 133/62 -- -- 111 18 92 %   20 1408 -- -- -- -- -- 94 %     0 L/min      Intake/Output Summary (Last 24 hours) at 2020 1340  Last data filed at 2020 0332  Gross per 24 hour   Intake 760 ml   Output 75 ml   Net 685 ml       Admission weight: 220 lb 3.8 oz (99.9 kg)  Patient Vitals for the past 96 hrs (Last 3 readings):   Weight   20 1330 214 lb 8.1 oz (97.3 kg)   20 0833 225 lb 12 oz (102.4 kg)   20 2017 225 lb 14.4 oz (102.5 kg)     Body mass index is 26.81 kg/m². EXAM:  CONSTITUTIONAL:  No acute distress. Pleasant  HEENT:  Head is normocephalic, Extraocular movement intact. Neck is supple. Voice is clear. CARDIOVASCULAR:  S1, S2  regular rate and rhythm. RESPIRATORY: Clear to ausculation bilaterally. Equal breath sounds. No wheezes.  No shortness of breath noted at rest.  ABDOMEN: soft, non tender  NEUROLOGICAL: Patient is alert and oriented to person, place, and time. Recent and remote memory intact. Thought is coherant. SKIN: no rash, No significant bruises on exposed surfaces  MUSCULOSKELETAL: Movement is coordinated. Moves all extremities   EXTREMITIES: Distal lower extremity temp is warm, No lower extremity edema. Upper extremity AV Fistula   PSYCHIATRIC: mood and affect appropriate.     Medications:   Med reviewed  Scheduled Meds:   vitamin B-12  1,000 mcg Oral Daily    folic acid  1 mg Oral Daily    thiamine  100 mg Oral Daily    therapeutic multivitamin-minerals  1 tablet Oral Daily    aspirin  81 mg Oral Daily    atorvastatin  40 mg Oral Nightly    cloNIDine  0.3 mg Oral TID    doxazosin  8 mg Oral 2 times per day    pantoprazole  40 mg Oral Daily    ferrous sulfate  325 mg Oral Daily with breakfast    isosorbide mononitrate  120 mg Oral Daily    minoxidil  10 mg Oral BID    sertraline  100 mg Oral Daily    Vitamin D  1,000 Units Oral Daily    sodium chloride flush  10 mL Intravenous 2 times per day    heparin (porcine)  5,000 Units Subcutaneous 3 times per day     Continuous Infusions:  PRN Meds hydrOXYzine, traZODone, sodium chloride flush, acetaminophen **OR** acetaminophen, polyethylene glycol, promethazine **OR** ondansetron   Labs:   Labs reviewed  Lab Results   Component Value Date    ANIONGAP 11.0 07/29/2020     Recent Labs     07/27/20  1500 07/28/20  0557   WBC 7.6 4.4*   RBC 3.72* 3.27*   HGB 11.6* 10.2*   HCT 38.3* 33.5*   .0* 102.4*   MCH 31.2 31.2   * 125*       Recent Labs     07/27/20  1500 07/28/20  0557 07/28/20  1508 07/29/20  1113    137  --  137   K 6.6* 5.5* 4.3 5.3*    100  --  101   CO2 27 27  --  25   BUN 57* 44*  --  41*   CREATININE 9.0* 8.2*  --  6.9*   LABGLOM 7* 8*  --  10*   GLUCOSE 92 96  --  136*   CALCIUM 8.7 8.6  --  9.0     Xr Chest Portable  Result Date: 7/27/2020  Bilateral pleural effusions and bibasilar airspace opacities likely related to underlying edema.     Summary 3/11/2020   Ejection fraction is visually estimated at 60%.   Overall left ventricular function is normal.   Severe concentric left ventricular hypertrophy.   Moderate systolic anterior motion (CASSIE) of anterior leaflet.   moderate LVOT obstruction   mean gradient 33 mm hg   peak gradient 53 mm hg     ASSESSMENT:  1. 1.  End Stage Renal Disease 2nd to diabetic and hypertensive nephrosclerosis on Hemodialysis, AV fistula. 2. Hyperkalemia, 2nd to ESRD, 2 K bath  Renal diet. Give Kayexalate 15 gm x 1 today. Plan for Hemodialysis in AM with 2 K bath,  3. Essential Hypertension with nephrosclerosis, Improved control  4. Severe LVH  5. Anemia of chronic disease, Hemodialysis out pt anemia protocol  6. Hyperphosphatemia   7. Secondary hyperparathyroidism of renal origin  8. Hx of AAA dissection with endograft repair 2018  9. Acute febrile illness, pneumonia   10. Hx alcohol and cocaine abuse      Active Problems:    Accelerated hypertension    Hyperkalemia  Resolved Problems:    * No resolved hospital problems.  WADE Castillo - CNP 1:40 PM 7/29/2020

## 2020-07-29 NOTE — PLAN OF CARE
Problem: Discharge Planning:  Goal: Participates in care planning  Description: Participates in care planning  Outcome: Ongoing   Pt plans to be discharged home with spouse when stable.      Problem: Cardiac Output - Decreased:  Goal: Hemodynamic stability will improve  Description: Hemodynamic stability will improve  Outcome: Ongoing   Vitals stable this shift. Will continue to monitor BP and resume home BP meds appropriately.      Problem: Pain:  Goal: Pain level will decrease  Description: Pain level will decrease  Outcome: Ongoing   Pain Assessment: 0-10  Pain Level: 0   Patient's Stated Pain Goal: No pain   Is pain goal met at this time? Yes      Pt denies pain this shift      Problem: Skin Integrity - Impaired:  Goal: Absence of new skin breakdown  Description: Absence of new skin breakdown  Outcome: Ongoing   Pt shows no signs of new skin breakdown. Pt turns self in bed and ambulates in room.     Problem: Fluid Volume:  Goal: Will show no signs or symptoms of fluid imbalance  Description: Will show no signs or symptoms of fluid imbalance  Outcome: Ongoing   Pt sent to emergent dialysis on admit for SOB and fluid overload. Went to dialysis 7/27 and 3L removed. Went again 7/28 and 4.5L removed.      Problem: Falls - Risk of:  Goal: Will remain free from falls  Description: Will remain free from falls  Outcome: Ongoing   Pt remains free from falls. Bed alarm on, call light and personal items within reach   Pt up with staff help.      Care plan reviewed with patient. Patient verbalize understanding of the plan of care and contribute to goal setting.

## 2020-07-29 NOTE — CARE COORDINATION
7/29/20, 1:44 PM EDT    DISCHARGE ON GOING [address] day: 2  Location: [facility]28/028A Reason for admit: Accelerated hypertension [I10]   Procedure: 7-27-20 HD 3L off.  7-28-20 HD 4.5 L off. Treatment Plan of Care: HD past 2 days. Creat today 6.9. K+ 5.3. BP this afternoon 144/63. SW has seen for discharge needs but pt declines HH. Barriers to Discharge: No  PCP: Edith Sosa MD  Readmission Risk Score: 44%  Patient Goals/Plan/Treatment Preferences: Plans home with spouse and declines HH services. Continue HD as prior to admission.

## 2020-07-29 NOTE — CARE COORDINATION
DISCHARGE/PLANNING EVALUATION  7/29/20, 12:37 PM EDT    Reason for Referral:  Possible HH  Mental Status:   Alert and oriented   Decision Making:  Makes his own decisions  Family/Social/Home Environment:   SW spoke to patient about his needs. He lives with his wife in a 2 story home with his bedroom and bathroom on second floor. He has no trouble climbing the steps. He uses a bathtub shower without a seat or grab bars and has no trouble using it. He does not use any DME to ambulate. He is able to drive. He states his wife takes him to dialysis. She does all housekeeping. He has a 27year old who lives in [place] and a 8year old who does not live with him. Current Services including food security, transportation and housekeeping:  See above  Current Equipment:  none  Payment Source:  Medicare and Medicaid  Concerns or Barriers to Discharge: We discussed that a [place] nurse for a few visits would be appropriate for assist with medications. He states (at this point in time he put his phone down as he continued to use it during the conversation) that his wife takes care of her 80year old mother. He knows his medications, when to take them etc. SW asked what happened that he was admitted. He states when his wife is not home he drank too much fluid and was admitted. SW advised a few visits from [place] may prevent his admission again but he states if he does not feel well he will be back. Post acute provider list with quality measures, geographic area and applicable managed care information provided. Questions regarding selection process answered: no    Teach Back Method used with patient regarding care plan and needs  Patient verbalize understanding of the plan of care and contribute to goal setting.        Patient goals, treatment preferences and discharge plan:  Plans home, refused [place]    Electronically signed by NITISH Cook on 7/29/2020 at 12:37 PM

## 2020-07-29 NOTE — PROGRESS NOTES
Hospitalist Progress Note    Patient:  Russell Rodriguez      Unit/Bed:4K-28/028-A    YOB: 1965    MRN: [id number]       Acct: [de-identified]     PCP: Jenene Councilman, MD    Date of Admission: 7/27/2020    Assessment/Plan:    1. Accelerated hypertension.   -likely multifactorial with volume overload while noncompliant with diet and fluid restriction and pharmacologic noncompliance with home medications leading to rebound hypertension with clonidine. Start Cardene drip. Resume PO Clonidine and Cardura. Hold CCB while on Cardene. Monitor BP closely following dialysis. -patient is noted to have hx of left sided renal artery stenosis   -patient noted to have hx of cocaine abuse and EtOH abuse  -patient noted to have hx of REZA and has so far refused sleep apnea  2. Bilateral plural effusions with pulmonary edema   -due to volume overload secondary to acute on chronic diastolic heart failure. ProBNP [id number]. Daily weights. Low sodium diet. Strict intake and output. 2 liter fluid restriction.   -seems to be improving as patient is 4L down following dialysis  3. Acute on chronic diastolic heart failure  -likely secondary to intervascular volume overload. -improving on hemodialysis    4. ESRD on HD   -secondary to FSGS likely due to apolipoprotien 2 per Nephrology  -Emergent run on 7/29/20 for volume overload. -Dr Paul Cisneros in Nephrology is following, we appreciate his assitance and recommendations.   -currently using 2 potassium bath to treat hyperkalemia  5. Hyperkalemia. -improving with dialysis  -likely secondary to missing dialysis, now 5.3  -Likely to resolve with dialysis. 6. Macrocytic anemia   -likely multifactorial secondary to anemia of chronic disease with CKD and poor nutrition  -Hbg 11.5 Resume Ferrous Sulfate.   -patient noted to have history of chronic alcohol abuse  7. Chronically elevated troponin   -likely due to CKD. CP free.    8. Secondary hyperparathyroidism   - likely secondary to CKD.   -Vitamin D replacement  9. HX AAA. -chronic, appears stable            Expected discharge date:  TBD    Disposition:    [] Home       [] TCU       [] Rehab       [] Psych       [] SNF       [] [place]       [] Other-    Chief Complaint: Shortness of Breath    Hospital Course:   Smiley Stuart is a 50year old male who presented to [address] [place] [address] with SOB. 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 Normal dialysis days T/TH/SA. Repots increased oral intake due to excessive thirst over the weekend. SOB started this AM, but became progressively worse so he presented to the ED. CXR with bilateral plural effusions with pulmonary edema. SBP >200. States he did not take his medications today. No chest pain or dizziness. Has had several admissions for the same issue in the last. Recently discharged 7/8/2020. Patient notably hyperkalemic on presentation. Received dialysis on 7/28/20 with net balance -7 liters. Subjective (past 24 hours):   Patient seen and evaluated at bedside this AM. Patient reports that he is feeling well and doing well. Discussed fluid restriction and pharmacologic compliance with patient including rebound hypertension with patient. Patient expresses understanding. Patient agreeable with discharge tomorrow. Patient otherwise has no complaints at this time.       Medications:  Reviewed    Infusion Medications   Scheduled Medications    vitamin B-12  1,000 mcg Oral Daily    folic acid  1 mg Oral Daily    thiamine  100 mg Oral Daily    therapeutic multivitamin-minerals  1 tablet Oral Daily    aspirin  81 mg Oral Daily    atorvastatin  40 mg Oral Nightly    cloNIDine  0.3 mg Oral TID    doxazosin  8 mg Oral 2 times per day    pantoprazole  40 mg Oral Daily    ferrous sulfate  325 mg Oral Daily with breakfast    isosorbide mononitrate  120 mg Oral Daily    minoxidil  10 mg Oral BID   

## 2020-07-30 VITALS
SYSTOLIC BLOOD PRESSURE: 158 MMHG | DIASTOLIC BLOOD PRESSURE: 80 MMHG | HEIGHT: 75 IN | HEART RATE: 90 BPM | TEMPERATURE: 97.1 F | OXYGEN SATURATION: 95 % | WEIGHT: 214.51 LBS | BODY MASS INDEX: 26.67 KG/M2 | RESPIRATION RATE: 20 BRPM

## 2020-07-30 PROBLEM — I10 ACCELERATED HYPERTENSION: Status: RESOLVED | Noted: 2018-08-06 | Resolved: 2020-07-30

## 2020-07-30 LAB
ANION GAP SERPL CALCULATED.3IONS-SCNC: 15 MEQ/L (ref 8–16)
BUN BLDV-MCNC: 58 MG/DL (ref 7–22)
CALCIUM SERPL-MCNC: 9 MG/DL (ref 8.5–10.5)
CHLORIDE BLD-SCNC: 100 MEQ/L (ref 98–111)
CO2: 23 MEQ/L (ref 23–33)
CREAT SERPL-MCNC: 8.5 MG/DL (ref 0.4–1.2)
GFR SERPL CREATININE-BSD FRML MDRD: 8 ML/MIN/1.73M2
GLUCOSE BLD-MCNC: 95 MG/DL (ref 70–108)
MRSA SCREEN: NORMAL
POTASSIUM REFLEX MAGNESIUM: 5.1 MEQ/L (ref 3.5–5.2)
SODIUM BLD-SCNC: 138 MEQ/L (ref 135–145)

## 2020-07-30 PROCEDURE — 36415 COLL VENOUS BLD VENIPUNCTURE: CPT

## 2020-07-30 PROCEDURE — 6370000000 HC RX 637 (ALT 250 FOR IP): Performed by: STUDENT IN AN ORGANIZED HEALTH CARE EDUCATION/TRAINING PROGRAM

## 2020-07-30 PROCEDURE — 99239 HOSP IP/OBS DSCHRG MGMT >30: CPT | Performed by: HOSPITALIST

## 2020-07-30 PROCEDURE — 6370000000 HC RX 637 (ALT 250 FOR IP): Performed by: HOSPITALIST

## 2020-07-30 PROCEDURE — 6370000000 HC RX 637 (ALT 250 FOR IP): Performed by: NURSE PRACTITIONER

## 2020-07-30 PROCEDURE — 6360000002 HC RX W HCPCS: Performed by: NURSE PRACTITIONER

## 2020-07-30 PROCEDURE — 90935 HEMODIALYSIS ONE EVALUATION: CPT | Performed by: INTERNAL MEDICINE

## 2020-07-30 PROCEDURE — 80048 BASIC METABOLIC PNL TOTAL CA: CPT

## 2020-07-30 PROCEDURE — 99232 SBSQ HOSP IP/OBS MODERATE 35: CPT | Performed by: INTERNAL MEDICINE

## 2020-07-30 RX ADMIN — ISOSORBIDE MONONITRATE 120 MG: 60 TABLET ORAL at 06:01

## 2020-07-30 RX ADMIN — MINOXIDIL 10 MG: 2.5 TABLET ORAL at 06:01

## 2020-07-30 RX ADMIN — HEPARIN SODIUM 5000 UNITS: 5000 INJECTION INTRAVENOUS; SUBCUTANEOUS at 05:57

## 2020-07-30 RX ADMIN — SERTRALINE HYDROCHLORIDE 100 MG: 100 TABLET, FILM COATED ORAL at 12:34

## 2020-07-30 RX ADMIN — Medication 100 MG: at 12:33

## 2020-07-30 RX ADMIN — DOXAZOSIN 8 MG: 4 TABLET ORAL at 06:01

## 2020-07-30 RX ADMIN — MULTIPLE VITAMINS W/ MINERALS TAB 1 TABLET: TAB at 12:33

## 2020-07-30 RX ADMIN — VERAPAMIL HYDROCHLORIDE 240 MG: 240 TABLET, FILM COATED, EXTENDED RELEASE ORAL at 06:01

## 2020-07-30 RX ADMIN — FOLIC ACID 1 MG: 1 TABLET ORAL at 12:33

## 2020-07-30 RX ADMIN — CLONIDINE HYDROCHLORIDE 0.3 MG: 0.2 TABLET ORAL at 06:02

## 2020-07-30 RX ADMIN — SEVELAMER CARBONATE 800 MG: 800 TABLET, FILM COATED ORAL at 12:34

## 2020-07-30 RX ADMIN — ASPIRIN 81 MG: 81 TABLET ORAL at 12:35

## 2020-07-30 RX ADMIN — VITAMIN D 1000 UNITS: 25 TAB ORAL at 12:34

## 2020-07-30 RX ADMIN — Medication 1000 MCG: at 12:34

## 2020-07-30 RX ADMIN — FERROUS SULFATE TAB 325 MG (65 MG ELEMENTAL FE) 325 MG: 325 (65 FE) TAB at 12:34

## 2020-07-30 RX ADMIN — PANTOPRAZOLE SODIUM 40 MG: 40 TABLET, DELAYED RELEASE ORAL at 12:35

## 2020-07-30 ASSESSMENT — PAIN SCALES - GENERAL
PAINLEVEL_OUTOF10: 0
PAINLEVEL_OUTOF10: 0

## 2020-07-30 NOTE — PROGRESS NOTES
Renal Progress Note    Assessment and Plan:    1. End stage kidney disease on hemodialysis. 2.  Volume overload completely resolves  3. Hyperkalemia resolved  4. Hypertension primary accelerated   5. Elevated troponin level of uncertain significant  6. Macrocytic anemia  7. Diabetes mellitus type 2  PLAN:  I discussed my thoughts with the patient  He understood. Addressed his questions. Labs reviewed.   Serum potassium is normal.  Medications reviewed  No changes  Hemodialysis this morning  Discussed with the floor staff  We will follow      Patient Active Problem List:     FSGS (focal segmental glomerulosclerosis)     Diabetes mellitus type 2, controlled ([address]     Monoclonal (M) protein disease, multiple 'M' protein     Depression     PTSD (post-traumatic stress disorder)     Secondary renal hyperparathyroidism ([address]     Cocaine use     Substance induced mood disorder ([address]     Renal artery stenosis (HCC) with uncontrollable hypertension     Chronic alcoholism (HCC)     Severe cocaine use disorder (HCC)     Essential hypertension     Uncontrolled hypertension     LVH (left ventricular hypertrophy) due to hypertensive disease     Acute on chronic diastolic CHF (congestive heart failure) ([facility])     REZA (obstructive sleep apnea)     Headache, unspecified headache type     FH: HTN (hypertension)     Hypertrophic cardiomyopathy ([address]     Diet-controlled diabetes mellitus ([address]     A-V fistula (HCC)     Chronic thoracic aortic dissection (HCC)     Hyperphosphatemia     Anemia in CKD (chronic kidney disease)     Vitamin D deficiency     Thrombocytopenia (HCC)     Personality disorder (HCC)     Tobacco abuse     Metabolic acidosis     Pneumonia due to organism     Precordial pain     ESRD on hemodialysis (HCC)     Edema of upper extremity     ESRD (end stage renal disease) ([address]     Hyperglycemia     Cocaine abuse, continuous - reports spending $100 on cocaine \"every other day\"     Homelessness     Moderate episode of recurrent major depressive disorder ([address]     Noncompliance of patient with renal dialysis ([address]     Anemia associated with stage 5 chronic renal failure ([address]     Hypertensive emergency     Cocaine use disorder, severe, dependence ([address]     Alcohol use disorder, severe, dependence ([address]     Abdominal aortic aneurysm (AAA) without rupture (HCC)     Atrial flutter with rapid ventricular response (HCC)     PVD (peripheral vascular disease) (HCC)     Dyslipidemia     Other fluid overload     Accelerated hypertension     Laceration of flexor muscle, fascia and tendon of left thumb at forearm level, initial encounter     Acute respiratory failure with hypoxia (HCC)     Acute pulmonary edema (HCC)     Macrocytic anemia     History of aortic aneurysm repair     Medical non-compliance     Volume overload     Chest pain     Dyspnea     Pancytopenia (HCC)     Acute febrile illness     Hyponatremia     Hypermagnesemia     History of atrial flutter     History of alcohol abuse     History of cocaine abuse (HCC)     Mild tricuspid regurgitation     Uremia, acute      Subjective:   Admit Date: 7/27/2020    Interval History:   Seen for end stage medial volume overload  Awake and alert this morning  Eating breakfast.  No complaint  No shortness of breath.   Updated by the staff  No issues of concern  Blood pressure is better      Medications:   Scheduled Meds:   verapamil  240 mg Oral Daily    sevelamer  800 mg Oral TID WC    vitamin B-12  1,000 mcg Oral Daily    folic acid  1 mg Oral Daily    thiamine  100 mg Oral Daily    therapeutic multivitamin-minerals  1 tablet Oral Daily    aspirin  81 mg Oral Daily    atorvastatin  40 mg Oral Nightly    cloNIDine  0.3 mg Oral TID    doxazosin  8 mg Oral 2 times per day    pantoprazole  40 mg Oral Daily    ferrous sulfate  325 mg Oral Daily with breakfast    isosorbide mononitrate  120 mg Oral Daily    minoxidil  10 mg Oral BID    sertraline  100 mg Oral Daily    Vitamin D ml       Constitutional:  Alert, awake, no apparent distress   Skin:normal with no rash or lesions. HEENT:Pupils are reactive . Throat is clear . Oral mucosa is moist   Neck:supple with no thyromegaly or carotid bruit  Cardiovascular:  S1, S2 without murmur or rubs   Respiratory: Bilateral crackles  Abdomen: +bs, soft, no tenderness to palpation and no palpable mass. No abdominal bruit   Ext: No LE edema  Musculoskeletal:Intact  Neuro:Alert and awake. Well oriented  with no focal deficit. Speech is normal      Electronically signed by Marlene Huertas MD on 7/30/2020 at 9:37 AM

## 2020-07-30 NOTE — PLAN OF CARE
Problem: Discharge Planning:  Goal: Participates in care planning  Description: Participates in care planning  Outcome: Ongoing  Note: Patient plans to return home with wife, denies further needs at this time. Goal: Discharged to appropriate level of care  Description: Discharged to appropriate level of care  Outcome: Ongoing  Note: Patient plans to return home, outpatient dialysis     Problem: Cardiac Output - Decreased:  Goal: Hemodynamic stability will improve  Description: Hemodynamic stability will improve  Outcome: Ongoing  Note:   Vitals:    07/29/20 2030   BP: 136/80   Pulse: 93   Resp: 16   Temp: 98.2 °F (36.8 °C)   SpO2: 96%     Patient hemodynamically stable this shift. .      Problem: Pain:  Goal: Pain level will decrease  Description: Pain level will decrease  Outcome: Ongoing  Note: Pain Assessment: 0-10  Pain Level: 0   Patient's Stated Pain Goal: No pain   Is pain goal met at this time? Yes             Problem: Skin Integrity - Impaired:  Goal: Absence of new skin breakdown  Description: Absence of new skin breakdown  Outcome: Ongoing  Note: No new skin breakdown this hospitalization, patient encouraged to turn self every so often, ambulating into bathroom       Problem: Fluid Volume:  Goal: Will show no signs or symptoms of fluid imbalance  Description: Will show no signs or symptoms of fluid imbalance  Outcome: Ongoing  Note: Strict I & O, dialysis tomorrow, pt denies shortness of breath       Problem: Falls - Risk of:  Goal: Will remain free from falls  Description: Will remain free from falls  Outcome: Ongoing  Note: Patient free from falls this shift, bed in lowest position, call light and items within reach. Goal: Absence of physical injury  Description: Absence of physical injury  Outcome: Ongoing  Note: No falls or new injury this shift.

## 2020-07-30 NOTE — PROGRESS NOTES
Readmission Assessment  Number of Days since last admission?: 8-30 days  Previous Disposition: Home with Family  Who is being Interviewed: Patient  What was the patient's/caregiver's perception as to why they think they needed to return back to the hospital?: Other (Comment)(He knew he was holding too much fluid.)  Did you visit your Primary Care Physician after you left the hospital, before you returned this time?: (Unknown)  Did you see a specialist, such as Cardiac, Pulmonary, Orthopedic Physician, etc. after you left the hospital?: Other (Comment)(Hemodialysis.)  Who advised the patient to return to the hospital?: Self-referral  Does the patient report anything that got in the way of taking their medications? : (Pt states his wife tends to her elderly mother so is not always there to assist him and this may be part of the reason he had to come back to hospital.)  In our efforts to provide the best possible care to you and others like you, can you think of anything that we could have done to help you after you left the hospital the first time, so that you might not have needed to return so soon?: (Pt states, no.  He states if we would have missed something he problably would not have returned to this facility.)

## 2020-07-30 NOTE — DISCHARGE SUMMARY
understanding and agreement with the care plan. On the day of discharge patient was seen and evaluated at bedside and found to be in stable condition and all labs, imaging, and care plan were reviewed by the attending. Patient discharge instructions were discussed with the patient by the nursing staff. Thank you Cecilio Ghosh MD for allowing us the opportunity to care for this patient    Exam:     Vitals:  Vitals:    07/29/20 2315 07/30/20 0330 07/30/20 0500 07/30/20 0729   BP: 138/73 (!) 173/82 (!) 158/74 (!) 115/52   Pulse: 92 91  100   Resp: 16 16  19   Temp: 97.4 °F (36.3 °C) 98 °F (36.7 °C)  97.8 °F (36.6 °C)   TempSrc: Oral Oral     SpO2: 94% 96%     Weight:  221 lb 3.2 oz (100.3 kg)  221 lb 12.5 oz (100.6 kg)   Height:         Weight: Weight: 221 lb 12.5 oz (100.6 kg)     24 hour intake/output:    Intake/Output Summary (Last 24 hours) at 7/30/2020 0956  Last data filed at 7/30/2020 0330  Gross per 24 hour   Intake 350 ml   Output 0 ml   Net 350 ml         General appearance:  No apparent distress, appears stated age and cooperative. HEENT:  Normal cephalic, atraumatic without obvious deformity. Conjunctivae/corneas clear. Neck: Supple, with full range of motion. No jugular venous distention. Trachea midline. Respiratory:  Normal respiratory effort. Clear to auscultation, bilaterally without Rales/Wheezes/Rhonchi. Cardiovascular:  Regular rate and rhythm with normal S1/S2 without murmurs, rubs or gallops. Abdomen: Soft, non-tender, non-distended with normal bowel sounds. Musculoskeletal:  No clubbing, cyanosis or edema bilaterally. Full range of motion without deformity. Skin: Skin color, texture, turgor normal.  No rashes or lesions. Neurologic:  Neurovascularly intact without any focal sensory/motor deficits. Grossly non-focal.  Psychiatric:  Alert and oriented, thought content appropriate, normal insight  Capillary Refill: Brisk,< 3 seconds   Peripheral Pulses: +2 palpable, equal bilaterally Labs: For convenience and continuity at follow-up the following most recent labs are provided:      CBC:    Lab Results   Component Value Date    WBC 4.4 07/28/2020    HGB 10.2 07/28/2020    HCT 33.5 07/28/2020     07/28/2020       Renal:    Lab Results   Component Value Date     07/30/2020    K 5.1 07/30/2020     07/30/2020    CO2 23 07/30/2020    BUN 58 07/30/2020    CREATININE 8.5 07/30/2020    CALCIUM 9.0 07/30/2020    PHOS 3.7 07/29/2020         Significant Diagnostic Studies    Radiology:   XR CHEST PORTABLE   Final Result   Bilateral pleural effusions and bibasilar airspace opacities likely related to underlying edema. Correlation advised. **This report has been created using voice recognition software. It may contain minor errors which are inherent in voice recognition technology. **      Final report electronically signed by Dr. Corinna Spence on 7/27/2020 3:40 PM             Consults:     IP CONSULT TO NEPHROLOGY  IP CONSULT TO HOME CARE NEEDS    Disposition: Home  Condition at Discharge: Stable    Code Status:  Full Code     Patient Instructions:    Discharge lab work: bmp in two weeks  Activity: activity as tolerated  Diet: DIET RENAL; Low Sodium (2 GM);  Daily Fluid Restriction: 2000 ml      Follow-up visits:   Myrna Henry MD  [address] [facility]  [phone]      staff-please s/u w/i 7d         Discharge Medications:      Nazia Chris   Home Medication Instructions YMP:[id number]    Printed on:07/30/20 2081   Medication Information                      acetaminophen (TYLENOL) 325 MG tablet  Take 650 mg by mouth every 4 hours as needed for Pain or Fever             aspirin 81 MG EC tablet  Take 1 tablet by mouth daily             atorvastatin (LIPITOR) 40 MG tablet  Take 1 tablet by mouth nightly             B Complex-C-Zn-Folic Acid (DIALYVITE 209-EOXM 15 PO)  Take 1 tablet by mouth daily             cloNIDine (CATAPRES) 0.3 MG tablet  Take 1 tablet by mouth 3 times daily             doxazosin (CARDURA) 8 MG tablet  Take 1 tablet by mouth every 12 hours for 28 days             famotidine (PEPCID) 20 MG tablet  Take 1 tablet by mouth daily             ferrous sulfate (FE TABS 325) 325 (65 Fe) MG EC tablet  Take 1 tablet by mouth 3 times daily (with meals)             fluticasone (FLONASE) 50 MCG/ACT nasal spray  1 spray by Nasal route daily              hydrOXYzine (ATARAX) 50 MG tablet  Take 50 mg by mouth 3 times daily as needed for Itching             isosorbide mononitrate (IMDUR) 120 MG extended release tablet  Take 1 tablet by mouth daily             minoxidil (LONITEN) 10 MG tablet  Take 2 tablets by mouth every 12 hours for 28 days             nitroGLYCERIN (NITROSTAT) 0.4 MG SL tablet  up to max of 3 total doses. If no relief after 1 dose, call 911.             sertraline (ZOLOFT) 100 MG tablet  Take 100 mg by mouth daily             traZODone (DESYREL) 50 MG tablet  Take 1 tablet by mouth nightly as needed for Sleep             verapamil (CALAN SR) 240 MG extended release tablet  Take 240 mg by mouth daily             vitamin D (CHOLECALCIFEROL) 25 MCG (1000 UT) TABS tablet  Take 1,000 Units by mouth daily                 Time Spent on discharge is more than 45 minutes in the examination, evaluation, counseling and review of medications and discharge plan. Signed: Thank you Jose Gamino MD for the opportunity to be involved in this patient's care.     Electronically signed by Ashlie Fam DO on 7/30/2020 at 9:56 AM

## 2020-07-30 NOTE — PLAN OF CARE
Problem: Discharge Planning:  Goal: Participates in care planning  Description: Participates in care planning  Outcome: Completed  Goal: Discharged to appropriate level of care  Description: Discharged to appropriate level of care  Outcome: Completed     Problem: Cardiac Output - Decreased:  Goal: Hemodynamic stability will improve  Description: Hemodynamic stability will improve  Outcome: Completed     Problem: Pain:  Goal: Pain level will decrease  Description: Pain level will decrease  Outcome: Completed     Problem: Skin Integrity - Impaired:  Goal: Absence of new skin breakdown  Description: Absence of new skin breakdown  Outcome: Completed     Problem: Fluid Volume:  Goal: Will show no signs or symptoms of fluid imbalance  Description: Will show no signs or symptoms of fluid imbalance  Outcome: Completed     Problem: Falls - Risk of:  Goal: Will remain free from falls  Description: Will remain free from falls  Outcome: Completed  Goal: Absence of physical injury  Description: Absence of physical injury  Outcome: Completed

## 2020-07-31 ENCOUNTER — CARE COORDINATION (OUTPATIENT)
Dept: CASE MANAGEMENT | Age: 55
End: 2020-07-31

## 2020-08-01 ENCOUNTER — CARE COORDINATION (OUTPATIENT)
Dept: CASE MANAGEMENT | Age: 55
End: 2020-08-01

## 2020-08-01 NOTE — CARE COORDINATION
Date/Time:  8/1/2020 3:20 PM  # 2nd attempt-Attempted to reach patient by telephone. Left HIPPA compliant message requesting a return call. Will attempt to reach patient again.

## 2020-09-02 ENCOUNTER — APPOINTMENT (OUTPATIENT)
Dept: GENERAL RADIOLOGY | Age: 55
End: 2020-09-02
Payer: MEDICARE

## 2020-09-02 ENCOUNTER — HOSPITAL ENCOUNTER (EMERGENCY)
Age: 55
Discharge: HOME | End: 2020-09-02
Attending: EMERGENCY MEDICINE
Payer: MEDICARE

## 2020-09-02 VITALS
TEMPERATURE: 98 F | WEIGHT: 215 LBS | OXYGEN SATURATION: 97 % | DIASTOLIC BLOOD PRESSURE: 91 MMHG | HEART RATE: 100 BPM | SYSTOLIC BLOOD PRESSURE: 174 MMHG | BODY MASS INDEX: 26.73 KG/M2 | RESPIRATION RATE: 30 BRPM | HEIGHT: 75 IN

## 2020-09-02 LAB
ALBUMIN SERPL-MCNC: 3.7 G/DL (ref 3.5–5.1)
ALP BLD-CCNC: 86 U/L (ref 38–126)
ALT SERPL-CCNC: 11 U/L (ref 11–66)
ANION GAP SERPL CALCULATED.3IONS-SCNC: 17 MEQ/L (ref 8–16)
APTT: 32.7 SECONDS (ref 22–38)
AST SERPL-CCNC: 13 U/L (ref 5–40)
BASOPHILS # BLD: 0.4 %
BASOPHILS ABSOLUTE: 0 THOU/MM3 (ref 0–0.1)
BILIRUB SERPL-MCNC: 0.3 MG/DL (ref 0.3–1.2)
BILIRUBIN DIRECT: < 0.2 MG/DL (ref 0–0.3)
BUN BLDV-MCNC: 87 MG/DL (ref 7–22)
CALCIUM SERPL-MCNC: 9.4 MG/DL (ref 8.5–10.5)
CHLORIDE BLD-SCNC: 103 MEQ/L (ref 98–111)
CO2: 20 MEQ/L (ref 23–33)
CREAT SERPL-MCNC: 11.8 MG/DL (ref 0.4–1.2)
EKG ATRIAL RATE: 98 BPM
EKG P AXIS: 36 DEGREES
EKG P-R INTERVAL: 172 MS
EKG Q-T INTERVAL: 390 MS
EKG QRS DURATION: 102 MS
EKG QTC CALCULATION (BAZETT): 497 MS
EKG R AXIS: -38 DEGREES
EKG T AXIS: 101 DEGREES
EKG VENTRICULAR RATE: 98 BPM
EOSINOPHIL # BLD: 1.7 %
EOSINOPHILS ABSOLUTE: 0.1 THOU/MM3 (ref 0–0.4)
ERYTHROCYTE [DISTWIDTH] IN BLOOD BY AUTOMATED COUNT: 13.6 % (ref 11.5–14.5)
ERYTHROCYTE [DISTWIDTH] IN BLOOD BY AUTOMATED COUNT: 50.4 FL (ref 35–45)
ETHYL ALCOHOL, SERUM: < 0.01 %
GFR SERPL CREATININE-BSD FRML MDRD: 5 ML/MIN/1.73M2
GLUCOSE BLD-MCNC: 92 MG/DL (ref 70–108)
HCT VFR BLD CALC: 36.4 % (ref 42–52)
HEMOGLOBIN: 11.3 GM/DL (ref 14–18)
IMMATURE GRANS (ABS): 0.02 THOU/MM3 (ref 0–0.07)
IMMATURE GRANULOCYTES: 0.3 %
INR BLD: 0.97 (ref 0.85–1.13)
LIPASE: 102.3 U/L (ref 5.6–51.3)
LYMPHOCYTES # BLD: 8.5 %
LYMPHOCYTES ABSOLUTE: 0.6 THOU/MM3 (ref 1–4.8)
MAGNESIUM: 2.7 MG/DL (ref 1.6–2.4)
MCH RBC QN AUTO: 31.3 PG (ref 26–33)
MCHC RBC AUTO-ENTMCNC: 31 GM/DL (ref 32.2–35.5)
MCV RBC AUTO: 100.8 FL (ref 80–94)
MONOCYTES # BLD: 6.9 %
MONOCYTES ABSOLUTE: 0.5 THOU/MM3 (ref 0.4–1.3)
NUCLEATED RED BLOOD CELLS: 0 /100 WBC
OSMOLALITY CALCULATION: 305.6 MOSMOL/KG (ref 275–300)
PLATELET # BLD: 139 THOU/MM3 (ref 130–400)
PMV BLD AUTO: 9.9 FL (ref 9.4–12.4)
POTASSIUM SERPL-SCNC: 5.7 MEQ/L (ref 3.5–5.2)
PRO-BNP: ABNORMAL PG/ML (ref 0–900)
RBC # BLD: 3.61 MILL/MM3 (ref 4.7–6.1)
SEG NEUTROPHILS: 82.2 %
SEGMENTED NEUTROPHILS ABSOLUTE COUNT: 5.8 THOU/MM3 (ref 1.8–7.7)
SODIUM BLD-SCNC: 140 MEQ/L (ref 135–145)
TOTAL PROTEIN: 7.2 G/DL (ref 6.1–8)
TROPONIN T: 0.07 NG/ML
WBC # BLD: 7 THOU/MM3 (ref 4.8–10.8)

## 2020-09-02 PROCEDURE — 82248 BILIRUBIN DIRECT: CPT

## 2020-09-02 PROCEDURE — 85025 COMPLETE CBC W/AUTO DIFF WBC: CPT

## 2020-09-02 PROCEDURE — 93010 ELECTROCARDIOGRAM REPORT: CPT | Performed by: INTERNAL MEDICINE

## 2020-09-02 PROCEDURE — 96374 THER/PROPH/DIAG INJ IV PUSH: CPT

## 2020-09-02 PROCEDURE — 6370000000 HC RX 637 (ALT 250 FOR IP): Performed by: EMERGENCY MEDICINE

## 2020-09-02 PROCEDURE — 85610 PROTHROMBIN TIME: CPT

## 2020-09-02 PROCEDURE — 99285 EMERGENCY DEPT VISIT HI MDM: CPT

## 2020-09-02 PROCEDURE — 83880 ASSAY OF NATRIURETIC PEPTIDE: CPT

## 2020-09-02 PROCEDURE — 71046 X-RAY EXAM CHEST 2 VIEWS: CPT

## 2020-09-02 PROCEDURE — 93005 ELECTROCARDIOGRAM TRACING: CPT | Performed by: EMERGENCY MEDICINE

## 2020-09-02 PROCEDURE — 2709999900 HC NON-CHARGEABLE SUPPLY

## 2020-09-02 PROCEDURE — 80053 COMPREHEN METABOLIC PANEL: CPT

## 2020-09-02 PROCEDURE — 6360000002 HC RX W HCPCS: Performed by: EMERGENCY MEDICINE

## 2020-09-02 PROCEDURE — 83690 ASSAY OF LIPASE: CPT

## 2020-09-02 PROCEDURE — G0480 DRUG TEST DEF 1-7 CLASSES: HCPCS

## 2020-09-02 PROCEDURE — 36415 COLL VENOUS BLD VENIPUNCTURE: CPT

## 2020-09-02 PROCEDURE — 83735 ASSAY OF MAGNESIUM: CPT

## 2020-09-02 PROCEDURE — 85730 THROMBOPLASTIN TIME PARTIAL: CPT

## 2020-09-02 PROCEDURE — 84484 ASSAY OF TROPONIN QUANT: CPT

## 2020-09-02 RX ORDER — CLONIDINE HYDROCHLORIDE 0.1 MG/1
0.1 TABLET ORAL ONCE
Status: COMPLETED | OUTPATIENT
Start: 2020-09-02 | End: 2020-09-02

## 2020-09-02 RX ORDER — HYDRALAZINE HYDROCHLORIDE 20 MG/ML
20 INJECTION INTRAMUSCULAR; INTRAVENOUS ONCE
Status: COMPLETED | OUTPATIENT
Start: 2020-09-02 | End: 2020-09-02

## 2020-09-02 RX ORDER — CLONIDINE HYDROCHLORIDE 0.1 MG/1
TABLET ORAL
Status: DISCONTINUED
Start: 2020-09-02 | End: 2020-09-02 | Stop reason: HOSPADM

## 2020-09-02 RX ADMIN — CLONIDINE HYDROCHLORIDE 0.1 MG: 0.1 TABLET ORAL at 06:45

## 2020-09-02 RX ADMIN — HYDRALAZINE HYDROCHLORIDE 20 MG: 20 INJECTION INTRAMUSCULAR; INTRAVENOUS at 05:14

## 2020-09-02 ASSESSMENT — ENCOUNTER SYMPTOMS
SHORTNESS OF BREATH: 1
SINUS PRESSURE: 0
CHOKING: 0
EYE REDNESS: 0
CONSTIPATION: 0
CHEST TIGHTNESS: 0
SORE THROAT: 0
EYE DISCHARGE: 0
VOMITING: 0
BLOOD IN STOOL: 0
RHINORRHEA: 0
ABDOMINAL DISTENTION: 0
EYE ITCHING: 0
EYE PAIN: 0
COUGH: 0
ABDOMINAL PAIN: 0
WHEEZING: 0
PHOTOPHOBIA: 0
BACK PAIN: 0
VOICE CHANGE: 0
DIARRHEA: 0
NAUSEA: 0
TROUBLE SWALLOWING: 0

## 2020-09-02 NOTE — ED TRIAGE NOTES
Pt comes into ER room 3 from triage via wheelchair with SOB, the patient missed his dialysis treatment yesterday and today he has increased SOB. Along with his BP is extra high today since he missed dialysis.

## 2020-09-02 NOTE — ED NOTES
Patient is not on oxygen at home. Patient requesting oxygen to be kept on for comfort.       Gigi Rosas RN  09/02/20 5401

## 2020-09-02 NOTE — ED NOTES
Upon first contact with patient this RN receives bedside shift report from [place], RN. Patient resting in bed with eyes closed. Respirations easy and unlabored.       Sebastian Case RN  09/02/20 9468

## 2020-09-02 NOTE — ED PROVIDER NOTES
[facility], [place]  eMERGENCY dEPARTMENT eNCOUnter          Chaim Moss       Chief Complaint   Patient presents with    Shortness of Breath     MISSED DIALYSIS YESTERDAY        Nurses Notes reviewed and I agree except as noted in the HPI. HISTORY OF PRESENT ILLNESS    Victor M Cruz is a 50 y.o. male who presents shortness of breath. This is a dialysis patient who missed his dialysis. His wife has been away taking care of her mother and he has been on his own. He states he has not been eating well. He is noticed some swelling in his ankles. He also noticed that he became more short of breath. On the day he missed his dialysis he called them and they told him to come in on Wednesday morning. This evening he became more more short of breath so he decided to come into the emergency room for further evaluation and treatment here. On presentation he is hypertensive. His chest x-ray did not show a lot of edema. Patient is not complaining of any chest pain. His AV fistula has good thrill. Patient is otherwise asymptomatic and resting comfortably on the cot no apparent distress. REVIEW OF SYSTEMS     Review of Systems   Constitutional: Negative for activity change, appetite change, diaphoresis, fatigue and unexpected weight change. HENT: Negative for congestion, ear discharge, ear pain, hearing loss, rhinorrhea, sinus pressure, sore throat, trouble swallowing and voice change. Eyes: Negative for photophobia, pain, discharge, redness and itching. Respiratory: Positive for shortness of breath. Negative for cough, choking, chest tightness and wheezing. Cardiovascular: Positive for leg swelling. Negative for chest pain and palpitations. Gastrointestinal: Negative for abdominal distention, abdominal pain, blood in stool, constipation, diarrhea, nausea and vomiting. Endocrine: Negative for polydipsia, polyphagia and polyuria.    Genitourinary: Negative for decreased urine volume, difficulty urinating, dysuria, enuresis, frequency, hematuria and urgency. Musculoskeletal: Negative for arthralgias, back pain, gait problem, myalgias, neck pain and neck stiffness. Skin: Negative for pallor and rash. Allergic/Immunologic: Negative for immunocompromised state. Neurological: Negative for dizziness, tremors, seizures, syncope, facial asymmetry, weakness, light-headedness, numbness and headaches. Hematological: Negative for adenopathy. Does not bruise/bleed easily. Psychiatric/Behavioral: Negative for agitation, hallucinations and suicidal ideas. The patient is not nervous/anxious. PAST MEDICAL HISTORY    has a past medical history of AAA (abdominal aortic aneurysm) ([facility] NURIS (acute kidney injury) ([facility] Anemia associated with chronic renal failure, Arthritis, Cocaine abuse ([facility] [address] Depression, Diabetes mellitus ([facility] [address] FSGS (focal segmental glomerulosclerosis), Hemodialysis patient ([facility] [address] Hemorrhoids, History of blood transfusion, Hyperlipidemia, Hyperphosphatemia, Hypertension, Left renal artery stenosis ([facility] [address] Monoclonal (M) protein disease, multiple 'M' protein, Nicotine dependence, Noncompliance, Pneumonia, Psychiatric problem, PTSD (post-traumatic stress disorder), Secondary hyperparathyroidism (of renal origin), and Sleep apnea. SURGICAL HISTORY      has a past surgical history that includes Leg Tendon Surgery; EKG 12 Lead (9/24/2015); Dialysis fistula creation (Left, 07/08/2016); vascular surgery (Left, 07/08/2016); vascular surgery (Right, 1990); Hand tendon surgery (Left, 4/5/2019); and Abdominal aortic aneurysm repair.     CURRENT MEDICATIONS       Previous Medications    ACETAMINOPHEN (TYLENOL) 325 MG TABLET    Take 650 mg by mouth every 4 hours as needed for Pain or Fever    ASPIRIN 81 MG EC TABLET    Take 1 tablet by mouth daily    ATORVASTATIN (LIPITOR) 40 MG TABLET    Take 1 tablet by mouth nightly    B COMPLEX-C-ZN-FOLIC ACID (DIALYVITE 250-YZVG 15 PO)    Take 1 tablet by mouth daily    CLONIDINE (CATAPRES) 0.3 MG TABLET    Take 1 tablet by mouth 3 times daily    DOXAZOSIN (CARDURA) 8 MG TABLET    Take 1 tablet by mouth every 12 hours for 28 days    FAMOTIDINE (PEPCID) 20 MG TABLET    Take 1 tablet by mouth daily    FERROUS SULFATE (FE TABS 325) 325 (65 FE) MG EC TABLET    Take 1 tablet by mouth 3 times daily (with meals)    FLUTICASONE (FLONASE) 50 MCG/ACT NASAL SPRAY    1 spray by Nasal route daily     HYDROXYZINE (ATARAX) 50 MG TABLET    Take 50 mg by mouth 3 times daily as needed for Itching    ISOSORBIDE MONONITRATE (IMDUR) 120 MG EXTENDED RELEASE TABLET    Take 1 tablet by mouth daily    MINOXIDIL (LONITEN) 10 MG TABLET    Take 2 tablets by mouth every 12 hours for 28 days    NITROGLYCERIN (NITROSTAT) 0.4 MG SL TABLET    up to max of 3 total doses. If no relief after 1 dose, call 911. SERTRALINE (ZOLOFT) 100 MG TABLET    Take 100 mg by mouth daily    TRAZODONE (DESYREL) 50 MG TABLET    Take 1 tablet by mouth nightly as needed for Sleep    VERAPAMIL (CALAN SR) 240 MG EXTENDED RELEASE TABLET    Take 240 mg by mouth daily    VITAMIN D (CHOLECALCIFEROL) 25 MCG (1000 UT) TABS TABLET    Take 1,000 Units by mouth daily       ALLERGIES     is allergic to humalog [insulin lispro]; insulin regular human; and lisinopril. FAMILY HISTORY     He indicated that his mother is . He indicated that his father is . He indicated that his brother is . family history includes Cancer in his mother; Other in his brother. SOCIAL HISTORY      reports that he has been smoking cigarettes. He started smoking about 34 years ago. He has a 5.00 pack-year smoking history. He has never used smokeless tobacco. He reports previous drug use. He reports that he does not drink alcohol. PHYSICAL EXAM     INITIAL VITALS:  height is 6' 3\" (1.905 m) and weight is 215 lb (97.5 kg). His oral temperature is 98 °F (36.7 °C).  His blood pressure is 185/92 (abnormal) and his pulse is 98. His respiration is 30 and oxygen saturation is 95%. Physical Exam  Vitals signs and nursing note reviewed. Constitutional:       General: He is not in acute distress. Appearance: He is well-developed. He is not diaphoretic. HENT:      Head: Normocephalic and atraumatic. Right Ear: External ear normal.      Left Ear: External ear normal.      Nose: Nose normal.   Eyes:      General: Lids are normal. No scleral icterus. Right eye: No discharge. Left eye: No discharge. Conjunctiva/sclera: Conjunctivae normal.      Right eye: No exudate. Left eye: No exudate. Pupils: Pupils are equal, round, and reactive to light. Neck:      Musculoskeletal: Normal range of motion and neck supple. Normal range of motion. Thyroid: No thyromegaly. Vascular: No JVD. Trachea: No tracheal deviation. Cardiovascular:      Rate and Rhythm: Normal rate and regular rhythm. Pulses: Normal pulses. Carotid pulses are 2+ on the right side and 2+ on the left side. Radial pulses are 2+ on the right side and 2+ on the left side. Femoral pulses are 2+ on the right side and 2+ on the left side. Popliteal pulses are 2+ on the right side and 2+ on the left side. Dorsalis pedis pulses are 2+ on the right side and 2+ on the left side. Posterior tibial pulses are 2+ on the right side and 2+ on the left side. Heart sounds: S1 normal and S2 normal. Murmur present. Systolic murmur present with a grade of 3/6. No friction rub. No gallop. Pulmonary:      Effort: Pulmonary effort is normal. No respiratory distress. Breath sounds: No stridor. Examination of the right-middle field reveals rales. Examination of the left-middle field reveals rales. Examination of the right-lower field reveals rales. Examination of the left-lower field reveals rales. Rales present. No decreased breath sounds, wheezing or rhonchi.    Chest:      Chest wall: No created using voice recognition software. It may contain minor errors which are inherent in voice recognition technology. **      Final report electronically signed by Dr Salbador Romeo on 9/2/2020 4:21 AM            LABS:   Labs Reviewed   CBC WITH AUTO DIFFERENTIAL - Abnormal; Notable for the following components:       Result Value    RBC 3.61 (*)     Hemoglobin 11.3 (*)     Hematocrit 36.4 (*)     .8 (*)     MCHC 31.0 (*)     RDW-SD 50.4 (*)     Lymphocytes Absolute 0.6 (*)     All other components within normal limits   BRAIN NATRIURETIC PEPTIDE - Abnormal; Notable for the following components:    Pro-BNP 80909.0 (*)     All other components within normal limits   BASIC METABOLIC PANEL - Abnormal; Notable for the following components:    Potassium 5.7 (*)     CO2 20 (*)     BUN 87 (*)     CREATININE 11.8 (*)     All other components within normal limits   LIPASE - Abnormal; Notable for the following components:    Lipase 102.3 (*)     All other components within normal limits   TROPONIN - Abnormal; Notable for the following components:    Troponin T 0.074 (*)     All other components within normal limits   MAGNESIUM - Abnormal; Notable for the following components:    Magnesium 2.7 (*)     All other components within normal limits   ANION GAP - Abnormal; Notable for the following components:    Anion Gap 17.0 (*)     All other components within normal limits   GLOMERULAR FILTRATION RATE, ESTIMATED - Abnormal; Notable for the following components:    Est, Glom Filt Rate 5 (*)     All other components within normal limits   OSMOLALITY - Abnormal; Notable for the following components:    Osmolality Calc 305.6 (*)     All other components within normal limits   PROTIME-INR   APTT   HEPATIC FUNCTION PANEL   [facility]       EMERGENCY DEPARTMENT COURSE:   Vitals:    Vitals:    09/02/20 0352 09/02/20 0427 09/02/20 0443 09/02/20 0457   BP: (!) 194/93 (!) 201/101 (!) 179/86 (!) 185/92   Pulse:       Resp:

## 2020-09-02 NOTE — ED NOTES
Pt in bed resting A&O x 3, Resps easy. No concerns voiced at this time. Pt updated on plan of care. Side rails up x 2 call light in reach. Dr notified.        Rui Chandler RN  09/02/20 3654

## 2020-09-02 NOTE — ED NOTES
Provided patient breakfast. Alert and oriented. Respirations easy and unlabored.       Kamar Verdin RN  09/02/20 6447

## 2020-09-03 ENCOUNTER — CARE COORDINATION (OUTPATIENT)
Dept: CARE COORDINATION | Age: 55
End: 2020-09-03

## 2020-09-15 ENCOUNTER — HOSPITAL ENCOUNTER (INPATIENT)
Age: 55
LOS: 1 days | Discharge: HOME | DRG: 177 | End: 2020-09-18
Attending: INTERNAL MEDICINE | Admitting: INTERNAL MEDICINE
Payer: MEDICARE

## 2020-09-15 ENCOUNTER — APPOINTMENT (OUTPATIENT)
Dept: GENERAL RADIOLOGY | Age: 55
DRG: 177 | End: 2020-09-15
Payer: MEDICARE

## 2020-09-15 PROBLEM — U07.1 COVID-19 VIRUS DETECTED: Status: ACTIVE | Noted: 2020-09-15

## 2020-09-15 LAB
ANION GAP SERPL CALCULATED.3IONS-SCNC: 19 MEQ/L (ref 8–16)
BASOPHILS # BLD: 0.3 %
BASOPHILS ABSOLUTE: 0 THOU/MM3 (ref 0–0.1)
BUN BLDV-MCNC: 97 MG/DL (ref 7–22)
CALCIUM SERPL-MCNC: 8.1 MG/DL (ref 8.5–10.5)
CHLORIDE BLD-SCNC: 102 MEQ/L (ref 98–111)
CO2: 20 MEQ/L (ref 23–33)
CREAT SERPL-MCNC: 14.7 MG/DL (ref 0.4–1.2)
EKG ATRIAL RATE: 92 BPM
EKG P AXIS: 61 DEGREES
EKG P-R INTERVAL: 196 MS
EKG Q-T INTERVAL: 400 MS
EKG QRS DURATION: 94 MS
EKG QTC CALCULATION (BAZETT): 494 MS
EKG R AXIS: -34 DEGREES
EKG T AXIS: 118 DEGREES
EKG VENTRICULAR RATE: 92 BPM
EOSINOPHIL # BLD: 1.1 %
EOSINOPHILS ABSOLUTE: 0 THOU/MM3 (ref 0–0.4)
ERYTHROCYTE [DISTWIDTH] IN BLOOD BY AUTOMATED COUNT: 13 % (ref 11.5–14.5)
ERYTHROCYTE [DISTWIDTH] IN BLOOD BY AUTOMATED COUNT: 46.3 FL (ref 35–45)
GFR SERPL CREATININE-BSD FRML MDRD: 4 ML/MIN/1.73M2
GLUCOSE BLD-MCNC: 102 MG/DL (ref 70–108)
HCT VFR BLD CALC: 29.5 % (ref 42–52)
HEMOGLOBIN: 9.4 GM/DL (ref 14–18)
IMMATURE GRANS (ABS): 0.02 THOU/MM3 (ref 0–0.07)
IMMATURE GRANULOCYTES: 0.6 %
LYMPHOCYTES # BLD: 17.3 %
LYMPHOCYTES ABSOLUTE: 0.6 THOU/MM3 (ref 1–4.8)
MCH RBC QN AUTO: 31.3 PG (ref 26–33)
MCHC RBC AUTO-ENTMCNC: 31.9 GM/DL (ref 32.2–35.5)
MCV RBC AUTO: 98.3 FL (ref 80–94)
MONOCYTES # BLD: 7.5 %
MONOCYTES ABSOLUTE: 0.3 THOU/MM3 (ref 0.4–1.3)
NUCLEATED RED BLOOD CELLS: 0 /100 WBC
OSMOLALITY CALCULATION: 311.6 MOSMOL/KG (ref 275–300)
PHOSPHORUS: 4.9 MG/DL (ref 2.4–4.7)
PLATELET # BLD: 71 THOU/MM3 (ref 130–400)
PLATELET ESTIMATE: ABNORMAL
PMV BLD AUTO: 10.9 FL (ref 9.4–12.4)
POTASSIUM SERPL-SCNC: 4.7 MEQ/L (ref 3.5–5.2)
RBC # BLD: 3 MILL/MM3 (ref 4.7–6.1)
SARS-COV-2, NAAT: DETECTED
SEG NEUTROPHILS: 73.2 %
SEGMENTED NEUTROPHILS ABSOLUTE COUNT: 2.6 THOU/MM3 (ref 1.8–7.7)
SODIUM BLD-SCNC: 141 MEQ/L (ref 135–145)
WBC # BLD: 3.6 THOU/MM3 (ref 4.8–10.8)

## 2020-09-15 PROCEDURE — 99285 EMERGENCY DEPT VISIT HI MDM: CPT

## 2020-09-15 PROCEDURE — 2580000003 HC RX 258: Performed by: INTERNAL MEDICINE

## 2020-09-15 PROCEDURE — U0002 COVID-19 LAB TEST NON-CDC: HCPCS

## 2020-09-15 PROCEDURE — 99284 EMERGENCY DEPT VISIT MOD MDM: CPT

## 2020-09-15 PROCEDURE — 36415 COLL VENOUS BLD VENIPUNCTURE: CPT

## 2020-09-15 PROCEDURE — 94761 N-INVAS EAR/PLS OXIMETRY MLT: CPT

## 2020-09-15 PROCEDURE — 93005 ELECTROCARDIOGRAM TRACING: CPT | Performed by: EMERGENCY MEDICINE

## 2020-09-15 PROCEDURE — 99222 1ST HOSP IP/OBS MODERATE 55: CPT | Performed by: INTERNAL MEDICINE

## 2020-09-15 PROCEDURE — 84100 ASSAY OF PHOSPHORUS: CPT

## 2020-09-15 PROCEDURE — 80048 BASIC METABOLIC PNL TOTAL CA: CPT

## 2020-09-15 PROCEDURE — 90935 HEMODIALYSIS ONE EVALUATION: CPT | Performed by: INTERNAL MEDICINE

## 2020-09-15 PROCEDURE — 93010 ELECTROCARDIOGRAM REPORT: CPT | Performed by: NUCLEAR MEDICINE

## 2020-09-15 PROCEDURE — 5A1D70Z PERFORMANCE OF URINARY FILTRATION, INTERMITTENT, LESS THAN 6 HOURS PER DAY: ICD-10-PCS | Performed by: INTERNAL MEDICINE

## 2020-09-15 PROCEDURE — G0378 HOSPITAL OBSERVATION PER HR: HCPCS

## 2020-09-15 PROCEDURE — 6370000000 HC RX 637 (ALT 250 FOR IP): Performed by: NURSE PRACTITIONER

## 2020-09-15 PROCEDURE — 71045 X-RAY EXAM CHEST 1 VIEW: CPT

## 2020-09-15 PROCEDURE — 90935 HEMODIALYSIS ONE EVALUATION: CPT

## 2020-09-15 PROCEDURE — 85025 COMPLETE CBC W/AUTO DIFF WBC: CPT

## 2020-09-15 PROCEDURE — 99219 PR INITIAL OBSERVATION CARE/DAY 50 MINUTES: CPT | Performed by: INTERNAL MEDICINE

## 2020-09-15 RX ORDER — ONDANSETRON 2 MG/ML
4 INJECTION INTRAMUSCULAR; INTRAVENOUS EVERY 6 HOURS PRN
Status: DISCONTINUED | OUTPATIENT
Start: 2020-09-15 | End: 2020-09-18 | Stop reason: HOSPADM

## 2020-09-15 RX ORDER — VERAPAMIL HYDROCHLORIDE 240 MG/1
240 TABLET, FILM COATED, EXTENDED RELEASE ORAL DAILY
Status: DISCONTINUED | OUTPATIENT
Start: 2020-09-16 | End: 2020-09-18 | Stop reason: HOSPADM

## 2020-09-15 RX ORDER — M-VIT,TX,IRON,MINS/CALC/FOLIC 27MG-0.4MG
1 TABLET ORAL DAILY
COMMUNITY

## 2020-09-15 RX ORDER — SODIUM CHLORIDE 0.9 % (FLUSH) 0.9 %
10 SYRINGE (ML) INJECTION PRN
Status: DISCONTINUED | OUTPATIENT
Start: 2020-09-15 | End: 2020-09-18 | Stop reason: HOSPADM

## 2020-09-15 RX ORDER — ACETAMINOPHEN 325 MG/1
650 TABLET ORAL EVERY 6 HOURS PRN
Status: DISCONTINUED | OUTPATIENT
Start: 2020-09-15 | End: 2020-09-18 | Stop reason: HOSPADM

## 2020-09-15 RX ORDER — HEPARIN SODIUM 5000 [USP'U]/ML
5000 INJECTION, SOLUTION INTRAVENOUS; SUBCUTANEOUS EVERY 8 HOURS SCHEDULED
Status: CANCELLED | OUTPATIENT
Start: 2020-09-15

## 2020-09-15 RX ORDER — FAMOTIDINE 20 MG/1
20 TABLET, FILM COATED ORAL EVERY OTHER DAY
Status: DISCONTINUED | OUTPATIENT
Start: 2020-09-16 | End: 2020-09-18 | Stop reason: HOSPADM

## 2020-09-15 RX ORDER — ASPIRIN 81 MG/1
81 TABLET ORAL DAILY
Status: DISCONTINUED | OUTPATIENT
Start: 2020-09-15 | End: 2020-09-18 | Stop reason: HOSPADM

## 2020-09-15 RX ORDER — SUCROFERRIC OXYHYDROXIDE 500 MG/1
500 TABLET, CHEWABLE ORAL
COMMUNITY

## 2020-09-15 RX ORDER — DOXAZOSIN MESYLATE 4 MG/1
8 TABLET ORAL EVERY 12 HOURS SCHEDULED
Status: DISCONTINUED | OUTPATIENT
Start: 2020-09-15 | End: 2020-09-18 | Stop reason: HOSPADM

## 2020-09-15 RX ORDER — SODIUM CHLORIDE 0.9 % (FLUSH) 0.9 %
10 SYRINGE (ML) INJECTION EVERY 12 HOURS SCHEDULED
Status: DISCONTINUED | OUTPATIENT
Start: 2020-09-15 | End: 2020-09-18 | Stop reason: HOSPADM

## 2020-09-15 RX ORDER — LANOLIN ALCOHOL/MO/W.PET/CERES
800 CREAM (GRAM) TOPICAL DAILY
COMMUNITY

## 2020-09-15 RX ORDER — PROMETHAZINE HYDROCHLORIDE 25 MG/1
12.5 TABLET ORAL EVERY 6 HOURS PRN
Status: DISCONTINUED | OUTPATIENT
Start: 2020-09-15 | End: 2020-09-18 | Stop reason: HOSPADM

## 2020-09-15 RX ORDER — ACETAMINOPHEN 650 MG/1
650 SUPPOSITORY RECTAL EVERY 6 HOURS PRN
Status: DISCONTINUED | OUTPATIENT
Start: 2020-09-15 | End: 2020-09-18 | Stop reason: HOSPADM

## 2020-09-15 RX ORDER — ISOSORBIDE MONONITRATE 60 MG/1
120 TABLET, EXTENDED RELEASE ORAL DAILY
Status: DISCONTINUED | OUTPATIENT
Start: 2020-09-16 | End: 2020-09-18 | Stop reason: HOSPADM

## 2020-09-15 RX ORDER — VITAMIN E 268 MG
400 CAPSULE ORAL DAILY
COMMUNITY

## 2020-09-15 RX ADMIN — SODIUM CHLORIDE, PRESERVATIVE FREE 10 ML: 5 INJECTION INTRAVENOUS at 20:16

## 2020-09-15 RX ADMIN — DOXAZOSIN 8 MG: 4 TABLET ORAL at 20:16

## 2020-09-15 RX ADMIN — CLONIDINE HYDROCHLORIDE 0.3 MG: 0.2 TABLET ORAL at 18:14

## 2020-09-15 RX ADMIN — CLONIDINE HYDROCHLORIDE 0.3 MG: 0.2 TABLET ORAL at 20:16

## 2020-09-15 RX ADMIN — SODIUM CHLORIDE, PRESERVATIVE FREE 10 ML: 5 INJECTION INTRAVENOUS at 11:30

## 2020-09-15 ASSESSMENT — PAIN SCALES - GENERAL
PAINLEVEL_OUTOF10: 0
PAINLEVEL_OUTOF10: 3
PAINLEVEL_OUTOF10: 0

## 2020-09-15 ASSESSMENT — PAIN DESCRIPTION - DESCRIPTORS: DESCRIPTORS: DISCOMFORT

## 2020-09-15 ASSESSMENT — PAIN DESCRIPTION - PAIN TYPE: TYPE: ACUTE PAIN

## 2020-09-15 ASSESSMENT — ENCOUNTER SYMPTOMS
NAUSEA: 0
COUGH: 0
SHORTNESS OF BREATH: 1
BACK PAIN: 0
WHEEZING: 0
ABDOMINAL PAIN: 0
VOMITING: 0
STRIDOR: 0
DIARRHEA: 0

## 2020-09-15 ASSESSMENT — PAIN DESCRIPTION - LOCATION: LOCATION: CHEST

## 2020-09-15 ASSESSMENT — PAIN DESCRIPTION - ORIENTATION: ORIENTATION: LEFT

## 2020-09-15 NOTE — ED NOTES
Pt placed in isolation at this time d/t wife being positive for COVID.      Rain [place] RN  09/15/20 4466

## 2020-09-15 NOTE — CONSULTS
Nephrology Consult Note  Patient's Name: Genevieve Mohan  3:16 PM  9/15/2020    Nephrologist: Regi Loera    Reason for Consult: End stage kidney disease. Hemodialysis management. Volume overload. Requesting Physician:  Ester Pena DO  PCP: Gretchen Juarez MD    Chief Complaint: Shortness of breath  Assessment  1. End stage kidney disease on hemodialysis. 2. SARS-CoV-2 infection. 3. Pulmonary edema. 4. Volume overload. 5. Metabolic acidosis. 6. Hypertension primary. 7. Pancytopenia. Plan    1. I discussed my thoughts at length with the patient. 2. He understood  3. I addressed his questions  4. Labs reviewed  5. Chest x-ray report reviewed  6. Medications reviewed  7. Hemodialysis in progress  8. Tolerating treatment well so far  9. Hemodynamically stable  10. Goal ultrafiltration is  5.5 kg  11. Discussed with dialysis staff  12. Will follow      History Obtained From: Patient, staff and medical record  History of Present Ilness:    Genevieve Mohan is a 50 y.o. male with history of end-stage kidney disease on hemodialysis, hypertension, anemia of chronic disease, secondary hyperparathyroidism, hyperphosphatemia among other multiple medical entities admitted through the emergency department after the patient presented with shortness of breath. Chest x-ray revealed  pulmonary vascular congestion. He also had mentioned his wife had tested positive for SARS-CoV-2. As a result he was tested. Indeed he is also positive. He denies any symptoms of SARS-CoV-2 however. His shortness of breath he thinks is from fluid overload and he may be correct. No cough. No fever or chills. Appetite is good. No change in taste buds. No anosmia. No chest pain. He receives hemodialysis treatment at [facility] here in [facility] on Tuesdays, Thursdays and Saturdays respectively. He was supposed to go earlier today but was too sick to go as a result he came to the emergency department.     Past Medical History: Diagnosis Date    AAA (abdominal aortic aneurysm) ([facility] [address]     [facility] 9/24/2015    Anemia associated with chronic renal failure     Arthritis     stated in hands    Cocaine abuse ([facility] [address] 5/10/2014    Depression     Diabetes mellitus ([facility] [address]     pt states he no longer has diabetes he has lost alot of davion.  FSGS (focal segmental glomerulosclerosis) 5/23/2013    [facility] 10/17/2016    [facility]    History of blood transfusion     Hyperlipidemia     Hyperphosphatemia 5/21/2016    Hypertension     Left renal artery stenosis ([facility] 5/22/2014    Monoclonal (M) protein disease, multiple 'M' protein     Nicotine dependence 6/16/2014    Noncompliance     Pneumonia     Psychiatric problem     PTSD (post-traumatic stress disorder)     [place] vet from [place]    Secondary hyperparathyroidism (of renal origin)     Sleep apnea        Past Surgical History:   Procedure Laterality Date    ABDOMINAL AORTIC ANEURYSM REPAIR      DIALYSIS FISTULA CREATION Left 07/08/2016    EKG 12-LEAD  9/24/2015         HAND TENDON SURGERY Left 4/5/2019    LEFT THUMB FLEXOR TENDON REPAIR performed by Alyssia Maurer MD at [facility] VASCULAR SURGERY Left 07/08/2016    AV Fistula    VASCULAR SURGERY Right 1990    Surgery on Achilles tendon       Family History   Problem Relation Age of Onset    Cancer Mother     Other Brother         aneurysm         reports that he has been smoking cigarettes. He started smoking about 34 years ago. He has a 5.00 pack-year smoking history. He has never used smokeless tobacco. He reports previous drug use. He reports that he does not drink alcohol. Allergies:  Humalog [insulin lispro];  Insulin regular human; and Lisinopril    Current Medications:    aspirin EC tablet 81 mg, Daily  [START ON 9/16/2020] famotidine (PEPCID) tablet 20 mg, Every Other Day  sodium chloride flush 0.9 % injection 10 mL, 2 times per day  sodium chloride flush 0.9 % injection 10 mL, PRN  acetaminophen (TYLENOL) tablet 650 mg, Q6H PRN    Or  acetaminophen (TYLENOL) suppository 650 mg, Q6H PRN  promethazine (PHENERGAN) tablet 12.5 mg, Q6H PRN    Or  ondansetron (ZOFRAN) injection 4 mg, Q6H PRN    HYDROmorphone (DILAUDID) injection 1 mg, Once  ioversol (OPTIRAY) 51 % injection 100 mL, ONCE PRN  lidocaine (LMX) 4 % cream, Once  midazolam (VERSED) injection 1 mg, Once        Review of Systems:   Pertinent positives stated above in HPI. All other systems were reviewed and were negative. ROS:Constitutional: negative  Eyes: negative  Ears, nose, mouth, throat, and face: negative  Respiratory: positive for shortness of breath  Cardiovascular: positive for dyspnea and lower extremity edema  Gastrointestinal: negative  Genitourinary:negative  Integument/breast: negative  Hematologic/lymphatic: negative  Musculoskeletal:negative  Neurological: negative  Behavioral/Psych: negative  Endocrine: negative  Allergic/Immunologic: negative    Physical exam:   Constitutional: Well-developed middle-aged gentleman in no obvious distress. Vitals:   Vitals:    09/15/20 1218   BP:    Pulse:    Resp:    Temp:    SpO2: 96%       Skin: no rash, turgor is normal  Heent: Pupils are reactive to light. Throat is clear. Oral mucosa is moist.  Neck: no bruits or jvd noted   Cardiovascular:  S1, S2 without murmur   Respiratory: Clear with no wheezes,rhonchi or rales   Abdomen: soft,non tender,good bowel sound and no palpable mass  Ext: 2+ bilateral lower extremity edema. Left upper extremity brachiocephalic fistula is noted. 2 needles attached dialysis tubing is noted. Psychiatric: mood and affect appropriate  Musculoskeletal:  Rom, muscular strength intact   CNS: Very awake and very alert. Well-oriented. Normal speech. Good motor strength. No obvious focal deficit.     Data:   Labs:  Lab Results   Component

## 2020-09-15 NOTE — FLOWSHEET NOTE
09/15/20 1130 09/15/20 1650   Vital Signs   BP  --  (!) 186/88   Temp 98.4 °F (36.9 °C) 98.3 °F (36.8 °C)   Pulse 85 98   Resp 17 18   SpO2 94 % 92 %   Weight 228 lb 6.3 oz (103.6 kg) 219 lb 2.2 oz (99.4 kg)   Post-Hemodialysis Assessment   Post-Treatment Procedures  --  Blood returned; Access bleeding time < 10 minutes   Machine Disinfection Process  --  Acid/Vinegar Clean;Heat Disinfect; Exterior Machine Disinfection   Total Liters Processed (l/min)  --  112.4 l/min   Dialyzer Clearance  --  Moderately streaked   Duration of Treatment (minutes)  --  270 minutes   Hemodialysis Intake (ml)  --  700 ml   Hemodialysis Output (ml)  --  5056 ml   NET Removed (ml)  --  4356 ml   Tolerated Treatment  --  Good   4.5hr tx completed without complications. pt clotted the machine with 3hours left of tx. lines reset and retested. Uf goal adjusted to accommodate frequent NS flushes to prevent further clotting. tolerated 4.3L fluid removal. Attempted 5.5L UF pt started cramping with 40 min left of tx uf was turned off. pressure held to each site x10 min. dressing clean dry and intact upon leaving bedside. report given to primary nurse. tx to be scanned into EMR.

## 2020-09-15 NOTE — ED NOTES
Pt updated on POC. Denies current needs. Will continue to monitor.      Cady Denson RN  09/15/20 0699

## 2020-09-15 NOTE — ED TRIAGE NOTES
Pt to the ED with c/o fluid overload. Dialysis pt T,TR,Sat. Pt last dialysis was Thursday. Was unable to go on Sat. States he's occasionally SOB as well. Pt resting on cot at this time, alert and oriented. Provider at bedside for initial evaluation.

## 2020-09-15 NOTE — ED NOTES
[facility] in [place], [place] called to see if they can get pt in today for dialysis. Pt wife positive for COVID. [facility] states pt was to arrive this AM at 0630 and he missed tx and didn't answer their call. Clinic states he cannot come today d/t another positive COVID pt coming in for dialysis, and since he's considered still a PUI for COVID, pt cannot have dialysis done there. Provider notified.        Keke Zuleta RN  09/15/20 0376

## 2020-09-15 NOTE — H&P
Hospitalist H+P      Patient:  Gary Ferrera    Unit/Bed:09/009A  YOB: 1965  MRN: [id number]   Acct: [de-identified]     PCP: Kieran Meade MD  Date of Admission: 9/15/2020        Assessment and Plan:        1. Volume overload: Nephrology has been consulted for emergency dialysis, will place on a renal diet, BMP is ordered for tomorrow. 2. End-stage renal disease on hemodialysis: Patient refuses to go to [place] for HD, due to traveling situation since it and the situation at home  3. COVID-19 virus detected: Asymptomatic, will place on COVID unit and monitor. CC: Evaluation of fluid overload    HPI: 65-year-old black male who presents emergency department for evaluation of fluid overload. He is a dialysis patient he has dialysis on Tuesday Thursday Saturday. Patient states his last dialysis treatment was 5 days ago. He states he is upset with the dialysis company because they want to send him to another town for dialysis. He states his wife is positive for COVID-19. All the COVID-19 and infected or suspect patient's per their protocol required to get to the same dialysis unit on the same day. The patient states he skipped his dialysis treatment that he would have had 3 days ago because he did not want to drive to [place] for treatment. He patient states he has felt he is gained weight he has swelling in his lower extremities he has mild dyspnea. The patient reports he has no symptoms or COVID-19. He has no cough, no fever, no loss of taste or smell, no nausea, vomiting, or diarrhea his past medical history includes secondary hyperparathyroidism noncompliance hyperphosphatemia hyperlipidemia diabetes mellitus depression cocaine abuse abdominal aortic aneurysm and now COVID-19 positive. ROS (14 point review of systems completed. Pertinent positives noted.  Otherwise ROS is negative) : Leg swelling, weight gain, orthopnea    PMH:  Per HPI and       Diagnosis Date    AAA (abdominal aortic aneurysm) ([facility]     NURIS (acute kidney injury) ([facility] 9/24/2015    Anemia associated with chronic renal failure     Arthritis     stated in hands    Cocaine abuse ([facility] 5/10/2014    Depression     Diabetes mellitus ([facility]     pt states he no longer has diabetes he has lost alot of davion.      FSGS (focal segmental glomerulosclerosis) 5/23/2013    Hemodialysis patient ([facility] 10/17/2016    on hemod[facility] 1/16/2012    History of blood transfusion     Hyperlipidemia     Hyperphosphatemia 5/21/2016    Hypertension     Left renal artery stenosis ([facility] 5/22/2014    Monoclonal (M) protein disease, multiple 'M' protein     Nicotine dependence 6/16/2014    Noncompliance     Pneumonia     Psychiatric problem     PTSD (post-traumatic stress disorder)     Pt vet from [place]    Secondary hyperparathyroidism (of renal origin)     Sleep apnea      SHX:        Procedure Laterality Date    ABDOMINAL AORTIC ANEURYSM REPAIR      DIALYSIS FISTULA CREATION Left 07/08/2016    EKG 12-LEAD  9/24/2015         HAND TENDON SURGERY Left 4/5/2019    LEFT THUMB FLEXOR TENDON REPAIR performed by Fabrizio Russo MD at [address] [place] [address] VASCULAR SURGERY Left 07/08/2016    AV Fistula    VASCULAR SURGERY Right 1990    Surgery on Achilles tendon     FHX:       Problem Relation Age of Onset    Cancer Mother     Other Brother         aneurysm      SOCHX:   Social History     Socioeconomic History    Marital status:      Spouse name: Andrea    Number of children: 2    Years of education: 15    Highest education level: None   Occupational History     Employer: UNEMPLOYED   Social Needs    Financial resource strain: None    Food insecurity     Worry: None     Inability: None    Transportation needs     Medical: None     Non-medical: None   Tobacco Use    Smoking status: Current Some Day Smoker     Packs/day: 0.25     Years: 20.00 Pack years: 5.00     Types: Cigarettes     Start date: 10/11/1985    Smokeless tobacco: Never Used    Tobacco comment: occasional   Substance and Sexual Activity    Alcohol use: No    Drug use: Not Currently     Comment: hx of    Sexual activity: Yes     Partners: Female   Lifestyle    Physical activity     Days per week: None     Minutes per session: None    Stress: None   Relationships    Social connections     Talks on phone: None     Gets together: None     Attends Temple service: None     Active member of club or organization: None     Attends meetings of clubs or organizations: None     Relationship status: None    Intimate partner violence     Fear of current or ex partner: None     Emotionally abused: None     Physically abused: None     Forced sexual activity: None   Other Topics Concern    None   Social History Narrative    None      Allergies: Humalog [insulin lispro]; Insulin regular human; and Lisinopril  Medications:          PHYSICAL EXAM:    BP (!) 140/77   Pulse 91   Temp 97.7 °F (36.5 °C) (Oral)   Resp 17   Wt 215 lb (97.5 kg)   SpO2 93%   BMI 26.87 kg/m²     General appearance:  No apparent distress, appears stated age and cooperative. HEENT:  Normal cephalic, atraumatic without obvious deformity. Pupils equal, round, and reactive to light. Extra ocular muscles intact. Conjunctivae/corneas clear. Neck: Supple, with full range of motion. no jugular venous distention. Trachea midline. no carotid bruits  Respiratory:  Normal respiratory effort. Clear to auscultation upper lobes crackles bibasilar approximately one third up posteriorly  Cardiovascular:  Regular rate and rhythm with normal S1/S2 with 3/6 murmur.-ve rubs or gallops. PMI non displaced  Abdomen: Soft, non-tender, non-distended with normal bowel sounds. No guarding, rebound. Musculoskeletal:  No clubbing, cyanosis. Pretibial edema bilaterally to mid calf area. Full range of motion without deformity.   Skin: Skin color, texture, turgor normal.  No rashes or lesions, or suspicious lesions. Neurologic:  Neurovascularly intact without any focal sensory/motor deficits. Cranial nerves: II-XII intact, grossly non-focal.  Psychiatric:  Alert and oriented, thought content appropriate, normal insight  Capillary Refill: Brisk,< 2 seconds   Peripheral Pulses: +2 palpable, equal bilaterally upper and lower extremities  Lymphatics: no lymphadenopathy    Data: (All radiographs, tracings, PFTs, and imaging are personally viewed and interpreted unless otherwise noted).  WBC 3.6   Hemoglobin 9.4   Platelets 71   Sodium 141   Potassium 4.7   BUN 97   Creatinine 14.7   Calcium 8.1  Recent Labs     09/15/20  0906   WBC 3.6*   HGB 9.4*   HCT 29.5*   PLT 71*      Recent Labs     09/15/20  0906      K 4.7      CO2 20*   BUN 97*   CREATININE 14.7*   CALCIUM 8.1*       No results for input(s): AST, ALT, BILIDIR, BILITOT, ALKPHOS in the last 72 hours.  No results for input(s): INR in the last 72 hours.  No results for input(s): Ro Ke in the last 72 hours. Radiology reports-images reviewed in PACS  Xr Chest (2 Vw)    Result Date: 9/2/2020  PROCEDURE: XR CHEST (2 VW) CLINICAL INFORMATION: 42-year-old female with shortness of breath for a day. COMPARISON: Chest x-ray 7/27/2020. TECHNIQUE: PA and lateral views of the chest were obtained. FINDINGS: There is an aortic endograft in the thoracic aorta. There is a moderate right-sided and small left-sided pleural effusion. There are opacities in the bilateral mid and lower lung fields which may represent infiltrates or atelectasis. There is no pneumothorax. Visualized portions of the upper abdomen are within normal limits. The osseous structures are intact. No acute fractures or suspicious osseous lesions. 1. Moderate right and small left-sided pleural effusions.  2. Opacities in the bilateral mid and lower lung fields which may represent infiltrates or atelectasis. **This report has been created using voice recognition software. It may contain minor errors which are inherent in voice recognition technology. ** Final report electronically signed by Dr Christy Russo on 9/2/2020 4:21 AM    Xr Chest Portable    Result Date: 9/15/2020  PROCEDURE: XR CHEST PORTABLE CLINICAL INFORMATION: missed dialysis treatment. Fluid overload, mild dyspnea. Renal failure. COMPARISON: Chest x-ray 3/12/2020 and 9/2/2020. TECHNIQUE: AP upright view of the chest. FINDINGS: There is a stent in the aortic arch and thoracic aorta. The mediastinal contours are widened. This is stable. There is cardiomegaly. There are small layering bilateral pleural effusions. These were also present on the prior film. The right-sided pleural effusion appears slightly larger. The pulmonary vessels are engorged. There is a hazy appearance to the mid and lower lung zones bilaterally. This may resent pulmonary edema. There is also a small focal opacity in the right upper lung which is new. This could also represent some pulmonary edema. Pneumonia is not excluded. 1. Worsening hazy appearance in the mid and lower lung zones suggesting pulmonary edema. There is also right upper lobe more focal opacity. This can also represent pulmonary edema. Pneumonia is not excluded. 2. Bilateral pleural effusions. These are small. The right-sided pleural effusion has slightly increased in size. **This report has been created using voice recognition software. It may contain minor errors which are inherent in voice recognition technology. ** Final report electronically signed by Dr. Aayn Kumar on 9/15/2020 9:38 AM      Electronically signed by Rajni Hdz DO on 9/15/2020 at 10:12 AM

## 2020-09-15 NOTE — ED NOTES
Pt transported to [facility] on cart in stable condition. Floor contacted before transport.       Marianna Boggs  09/15/20 [id number]

## 2020-09-15 NOTE — ED PROVIDER NOTES
[phone] COMPLAINT       Chief Complaint   Patient presents with    Other     fluid overload       Nurses Notes reviewed and I agree except as noted in the HPI. HISTORY OF PRESENT ILLNESS    Eliazar Lord is a 50 y.o. male who presents to the Emergency Department for the evaluation of fluid overload. Patient is a dialysis patient. He has dialysis on Tuesday, Thursday, Saturday. The patient states his last dialysis treatment was 5 days ago. He states he is upset with the [employer] dialysis company because they want to send him to another town for dialysis. He states his wife is positive for COVID-19. All of the COVID-19 infected or suspected patient's per their protocol are required to go to the same dialysis unit on the same day. The patient states he skipped his dialysis treatment that he would have had 3 days ago because he did not want to drive that far for his treatment. Patient states that he can feel that he has gained weight, he has swelling in his lower extremities. He has mild dyspnea. The patient reports he has no symptoms of COVID-19. He has no cough, no fever, no loss of taste or smell. No nausea, vomiting or diarrhea. He states he just wants his dialysis treatment but he is not going to drive to [facility] for treatment. The HPI was provided by the patient. REVIEW OF SYSTEMS     Review of Systems   Constitutional: Negative for chills, diaphoresis and fatigue. Respiratory: Positive for shortness of breath (mild). Negative for cough, wheezing and stridor. Cardiovascular: Positive for leg swelling. Negative for chest pain. Gastrointestinal: Negative for abdominal pain, diarrhea, nausea and vomiting. Musculoskeletal: Negative for back pain. Neurological: Negative for dizziness and light-headedness. Psychiatric/Behavioral: Negative for behavioral problems.        PAST MEDICAL HISTORY    has a past medical history of AAA (abdominal aortic aneurysm) ([facility] NURIS (acute kidney injury) ([facility] Anemia associated with chronic renal failure, Arthritis, Cocaine abuse ([facility] Depression, Diabetes mellitus ([facility] FSGS (focal segmental glomerulosclerosis), Hemodialysis patient ([facility] Hemorrhoids, History of blood transfusion, Hyperlipidemia, Hyperphosphatemia, Hypertension, Left renal artery stenosis ([facility] Monoclonal (M) protein disease, multiple 'M' protein, Nicotine dependence, Noncompliance, Pneumonia, Psychiatric problem, PTSD (post-traumatic stress disorder), Secondary hyperparathyroidism (of renal origin), and Sleep apnea. SURGICAL HISTORY      has a past surgical history that includes Leg Tendon Surgery; EKG 12 Lead (9/24/2015); Dialysis fistula creation (Left, 07/08/2016); vascular surgery (Left, 07/08/2016); vascular surgery (Right, 1990); Hand tendon surgery (Left, 4/5/2019); and Abdominal aortic aneurysm repair.     CURRENT MEDICATIONS       Current Discharge Medication List      CONTINUE these medications which have NOT CHANGED    Details   Sucroferric Oxyhydroxide (VELPHORO) 500 MG CHEW Take 500 mg by mouth 4 times daily (before meals and nightly) Take with meals and snacks      Multiple Vitamins-Minerals (THERAPEUTIC MULTIVITAMIN-MINERALS) tablet Take 1 tablet by mouth daily      folic acid (FOLVITE) 502 MCG tablet Take 800 mcg by mouth daily      vitamin E 400 UNIT capsule Take 400 Units by mouth daily      ferrous sulfate (FE TABS 325) 325 (65 Fe) MG EC tablet Take 1 tablet by mouth 3 times daily (with meals)  Qty: 270 tablet, Refills: 3      B Complex-C-Zn-Folic Acid (DIALYVITE 737-PAEA 15 PO) Take 1 tablet by mouth daily      verapamil (CALAN SR) 240 MG extended release tablet Take 240 mg by mouth daily      vitamin D (CHOLECALCIFEROL) 25 MCG (1000 UT) TABS tablet Take 1,000 Units by mouth 2 times daily Take two tablets twice a day      famotidine (PEPCID) 20 MG tablet Take 1 tablet by mouth daily  Qty: 60 tablet, Refills: 0      sertraline (ZOLOFT) 100 MG tablet Take 100 mg by mouth daily      hydrOXYzine (ATARAX) 50 MG tablet Take 50 mg by mouth 3 times daily as needed for Itching      aspirin 81 MG EC tablet Take 1 tablet by mouth daily  Qty: 7 tablet, Refills: 0      isosorbide mononitrate (IMDUR) 120 MG extended release tablet Take 1 tablet by mouth daily  Qty: 7 tablet, Refills: 0      atorvastatin (LIPITOR) 40 MG tablet Take 1 tablet by mouth nightly  Qty: 7 tablet, Refills: 0      fluticasone (FLONASE) 50 MCG/ACT nasal spray 1 spray by Nasal route daily       minoxidil (LONITEN) 10 MG tablet Take 2 tablets by mouth every 12 hours for 28 days  Qty: 21 tablet, Refills: 1      nitroGLYCERIN (NITROSTAT) 0.4 MG SL tablet up to max of 3 total doses. If no relief after 1 dose, call 911. Qty: 25 tablet, Refills: 3      acetaminophen (TYLENOL) 325 MG tablet Take 650 mg by mouth every 4 hours as needed for Pain or Fever      cloNIDine (CATAPRES) 0.3 MG tablet Take 1 tablet by mouth 3 times daily  Qty: 30 tablet, Refills: 3      doxazosin (CARDURA) 8 MG tablet Take 1 tablet by mouth every 12 hours for 28 days  Qty: 14 tablet, Refills: 3      traZODone (DESYREL) 50 MG tablet Take 1 tablet by mouth nightly as needed for Sleep  Qty: 7 tablet, Refills: 0             ALLERGIES     is allergic to humalog [insulin lispro]; insulin regular human; and lisinopril. FAMILY HISTORY     He indicated that his mother is . He indicated that his father is . He indicated that his brother is . family history includes Cancer in his mother; Other in his brother. SOCIAL HISTORY      reports that he has been smoking cigarettes. He started smoking about 34 years ago. He has a 5.00 pack-year smoking history. He has never used smokeless tobacco. He reports previous drug use. He reports that he does not drink alcohol.     PHYSICAL EXAM     INITIAL VITALS:  height is 6' (1.829 m) and weight is 228 lb 6.3 oz (103.6 kg). His temperature is 98.4 °F (36.9 °C). His blood pressure is 144/77 (abnormal) and his pulse is 85. His respiration is 17 and oxygen saturation is 96%. Physical Exam  Constitutional:       Appearance: Normal appearance. He is not ill-appearing. HENT:      Head: Normocephalic. Eyes:      Pupils: Pupils are equal, round, and reactive to light. Cardiovascular:      Rate and Rhythm: Normal rate and regular rhythm. Heart sounds: Murmur present. Systolic murmur present with a grade of 5/6. Pulmonary:      Effort: Pulmonary effort is normal. No respiratory distress. Breath sounds: Normal breath sounds. No wheezing or rhonchi. Abdominal:      General: Abdomen is flat. Bowel sounds are normal. There is no distension. Tenderness: There is no abdominal tenderness. Musculoskeletal:      Right lower leg: Edema (1+ non pitting) present. Left lower leg: Edema (1+ non pitting) present. Skin:     General: Skin is warm and dry. Capillary Refill: Capillary refill takes less than 2 seconds. Neurological:      General: No focal deficit present. Mental Status: He is alert. Psychiatric:         Mood and Affect: Mood normal.         Behavior: Behavior normal.          DIFFERENTIAL DIAGNOSIS:   Fluid overload, electrolyte abnormality, need for dialysis, noncompliance with current treatment    DIAGNOSTIC RESULTS     EKG: All EKG's are interpreted by the Emergency Department Physician who either signs or Co-signs this chart in the absence of a cardiologist.  Normal sinus rhythm. Ventricular rate 92 bpm.  Possible left atrial enlargement. T wave abnormality consider lateral ischemia. Prolonged QT interval 494 ms. NH interval 186, QRS duration 94. RADIOLOGY: non-plainfilm images(s) such as CT, Ultrasound and MRI are read by the radiologist.    XR CHEST PORTABLE   Final Result   1. Worsening hazy appearance in the mid and lower lung zones suggesting pulmonary edema.  There is also right upper lobe more focal opacity. This can also represent pulmonary edema. Pneumonia is not excluded. 2. Bilateral pleural effusions. These are small. The right-sided pleural effusion has slightly increased in size. **This report has been created using voice recognition software. It may contain minor errors which are inherent in voice recognition technology. **      Final report electronically signed by Dr. Azeb Mcmullen on 9/15/2020 9:38 AM          LABS:     Labs Reviewed   CBC WITH AUTO DIFFERENTIAL - Abnormal; Notable for the following components:       Result Value    WBC 3.6 (*)     RBC 3.00 (*)     Hemoglobin 9.4 (*)     Hematocrit 29.5 (*)     MCV 98.3 (*)     MCHC 31.9 (*)     RDW-SD 46.3 (*)     Platelets 71 (*)     Lymphocytes Absolute 0.6 (*)     Monocytes Absolute 0.3 (*)     All other components within normal limits   BASIC METABOLIC PANEL - Abnormal; Notable for the following components:    CO2 20 (*)     BUN 97 (*)     CREATININE 14.7 (*)     Calcium 8.1 (*)     All other components within normal limits   COVID-19 - Abnormal; Notable for the following components:    SARS-CoV-2, NAAT DETECTED (*)     All other components within normal limits   ANION GAP - Abnormal; Notable for the following components:    Anion Gap 19.0 (*)     All other components within normal limits   GLOMERULAR FILTRATION RATE, ESTIMATED - Abnormal; Notable for the following components:    Est, Glom Filt Rate 4 (*)     All other components within normal limits   OSMOLALITY - Abnormal; Notable for the following components:    Osmolality Calc 311.6 (*)     All other components within normal limits   PHOSPHORUS - Abnormal; Notable for the following components:    Phosphorus 4.9 (*)     All other components within normal limits       EMERGENCY DEPARTMENT COURSE:   Vitals:    Vitals:    09/15/20 0924 09/15/20 1040 09/15/20 1130 09/15/20 1218   BP: (!) 140/77 (!) 144/77     Pulse: 91 91 85    Resp: 17 23 17    Temp: 98.4 °F (36.9 °C) 98.4 °F (36.9 °C)    TempSrc:  Oral     SpO2: 93% 94% 94% 96%   Weight:  228 lb 3.2 oz (103.5 kg) 228 lb 6.3 oz (103.6 kg)    Height:  6' (1.829 m)         8:34 AM EDT: The patient was seen and evaluated. Appropriate labs and EKG are ordered. I did contact [address] [place] [address] who advised that this is the protocol for dialysis. He cannot change the protocol. He advised that we can set up transportation if they are available to dialyze him today as this is the COVID suspect today in [facility]. If the patient is unable to be dialyzed today, he will require inpatient admission for inpatient dialysis. I advised the patient of this. He is upset that he cannot be dialyzed at his normal unit. He will comply with treatment if transportation is set up for him. I talked to the charge nurse to see if transportation and arrangements can be set up for dialysis in the [facility] area. The dialysis unit in [facility] was contacted. They advised they had a spot for the patient at 6 AM this morning but are unable to provide dialysis this afternoon for him. I contacted [address] [place] [address] and advised they cannot fit him in this afternoon. As the patient needs dialysis, he will require admission for inpatient dialysis treatment. MDM:  Patient will require dialysis today. He is unable to be dialyzed at the [facility] dialysis unit today in [facility]. The patient was swabbed for COVID and then admitted to the inpatient dialysis unit. Dr. Efra Singh accepted the patient for admission. He is in the isolation area of inpatient dialysis. While in the dialysis unit, the patient is positive for COVID. CRITICAL CARE:   None    CONSULTS:  Dr Pily Rizzo, Nephrology    PROCEDURES:  None    FINAL IMPRESSION      1.  Localized edema due to fluid overload          DISPOSITION/PLAN   Admit    PATIENT REFERRED TO:  MD Antonette Harding [phone]. [id number]  [phone]            DISCHARGE MEDICATIONS:  Current Discharge Medication List          (Please note that portions of this note were completed with a voice recognition program.  Efforts were made to edit the dictations but occasionally words are mis-transcribed.)    The patient was given an opportunity to see the Emergency Attending. The patient voiced understanding that I was a Mid-LevelProvider and was in agreement with being seen independently by myself.           Hemalatha Nunez, WADE - CNP  09/15/20 [id number]

## 2020-09-15 NOTE — ED NOTES
ED to inpatient nurses report    Chief Complaint   Patient presents with    Other     fluid overload      Present to ED from home  LOC: alert and orientated to name, place, date  Vital signs   Vitals:    09/15/20 0840 09/15/20 0855 09/15/20 0924   BP: (!) 154/79  (!) 140/77   Pulse: 96 94 91   Resp: 16 16 17   Temp: 97.7 °F (36.5 °C)     TempSrc: Oral     SpO2: 95% 96% 93%   Weight: 215 lb (97.5 kg)        Oxygen Baseline room air    Current needs required room air   LDAs:   Peripheral IV 09/15/20 Right Forearm (Active)   Site Assessment Clean;Dry; Intact 09/15/20 0932   Line Status Blood return noted; Flushed;Normal saline locked 09/15/20 0932   Dressing Status Clean;Dry; Intact 09/15/20 0932     Mobility: Independent  Pending ED orders: none  Present condition: stable      Electronically signed by Horacio Oliva RN on 9/15/2020 at 9:34 AM     Horacio Oliva RN  09/15/20 [id number]

## 2020-09-16 LAB
ABO: NORMAL
ALBUMIN SERPL-MCNC: 3 G/DL (ref 3.5–5.1)
ALP BLD-CCNC: 61 U/L (ref 38–126)
ALT SERPL-CCNC: 6 U/L (ref 11–66)
ANION GAP SERPL CALCULATED.3IONS-SCNC: 12 MEQ/L (ref 8–16)
ANTIBODY SCREEN: NORMAL
AST SERPL-CCNC: 11 U/L (ref 5–40)
BASOPHILS # BLD: 0.3 %
BASOPHILS ABSOLUTE: 0 THOU/MM3 (ref 0–0.1)
BILIRUB SERPL-MCNC: 0.3 MG/DL (ref 0.3–1.2)
BILIRUBIN DIRECT: < 0.2 MG/DL (ref 0–0.3)
BUN BLDV-MCNC: 48 MG/DL (ref 7–22)
CALCIUM SERPL-MCNC: 8.2 MG/DL (ref 8.5–10.5)
CHLORIDE BLD-SCNC: 102 MEQ/L (ref 98–111)
CO2: 23 MEQ/L (ref 23–33)
CREAT SERPL-MCNC: 9 MG/DL (ref 0.4–1.2)
EOSINOPHIL # BLD: 1.6 %
EOSINOPHILS ABSOLUTE: 0 THOU/MM3 (ref 0–0.4)
ERYTHROCYTE [DISTWIDTH] IN BLOOD BY AUTOMATED COUNT: 13.1 % (ref 11.5–14.5)
ERYTHROCYTE [DISTWIDTH] IN BLOOD BY AUTOMATED COUNT: 47 FL (ref 35–45)
GFR SERPL CREATININE-BSD FRML MDRD: 7 ML/MIN/1.73M2
GLUCOSE BLD-MCNC: 107 MG/DL (ref 70–108)
GLUCOSE BLD-MCNC: 117 MG/DL (ref 70–108)
GLUCOSE BLD-MCNC: 92 MG/DL (ref 70–108)
GLUCOSE BLD-MCNC: 92 MG/DL (ref 70–108)
HCT VFR BLD CALC: 28.7 % (ref 42–52)
HEMOGLOBIN: 9 GM/DL (ref 14–18)
IMMATURE GRANS (ABS): 0.02 THOU/MM3 (ref 0–0.07)
IMMATURE GRANULOCYTES: 0.7 %
INR BLD: 1.03 (ref 0.85–1.13)
LYMPHOCYTES # BLD: 13.4 %
LYMPHOCYTES ABSOLUTE: 0.4 THOU/MM3 (ref 1–4.8)
MCH RBC QN AUTO: 31 PG (ref 26–33)
MCHC RBC AUTO-ENTMCNC: 31.4 GM/DL (ref 32.2–35.5)
MCV RBC AUTO: 99 FL (ref 80–94)
MONOCYTES # BLD: 8.1 %
MONOCYTES ABSOLUTE: 0.3 THOU/MM3 (ref 0.4–1.3)
NUCLEATED RED BLOOD CELLS: 0 /100 WBC
PLATELET # BLD: 79 THOU/MM3 (ref 130–400)
PMV BLD AUTO: 10.9 FL (ref 9.4–12.4)
POTASSIUM REFLEX MAGNESIUM: 4.9 MEQ/L (ref 3.5–5.2)
RBC # BLD: 2.9 MILL/MM3 (ref 4.7–6.1)
RH FACTOR: NORMAL
SEG NEUTROPHILS: 75.9 %
SEGMENTED NEUTROPHILS ABSOLUTE COUNT: 2.4 THOU/MM3 (ref 1.8–7.7)
SODIUM BLD-SCNC: 137 MEQ/L (ref 135–145)
TOTAL PROTEIN: 6.4 G/DL (ref 6.1–8)
WBC # BLD: 3.1 THOU/MM3 (ref 4.8–10.8)

## 2020-09-16 PROCEDURE — 6370000000 HC RX 637 (ALT 250 FOR IP): Performed by: STUDENT IN AN ORGANIZED HEALTH CARE EDUCATION/TRAINING PROGRAM

## 2020-09-16 PROCEDURE — 80076 HEPATIC FUNCTION PANEL: CPT

## 2020-09-16 PROCEDURE — 6370000000 HC RX 637 (ALT 250 FOR IP): Performed by: NURSE PRACTITIONER

## 2020-09-16 PROCEDURE — 36415 COLL VENOUS BLD VENIPUNCTURE: CPT

## 2020-09-16 PROCEDURE — 2580000003 HC RX 258: Performed by: INTERNAL MEDICINE

## 2020-09-16 PROCEDURE — 99226 PR SBSQ OBSERVATION CARE/DAY 35 MINUTES: CPT | Performed by: INTERNAL MEDICINE

## 2020-09-16 PROCEDURE — P9059 PLASMA, FRZ BETWEEN 8-24HOUR: HCPCS

## 2020-09-16 PROCEDURE — 90935 HEMODIALYSIS ONE EVALUATION: CPT

## 2020-09-16 PROCEDURE — 99232 SBSQ HOSP IP/OBS MODERATE 35: CPT | Performed by: INTERNAL MEDICINE

## 2020-09-16 PROCEDURE — G0378 HOSPITAL OBSERVATION PER HR: HCPCS

## 2020-09-16 PROCEDURE — 86850 RBC ANTIBODY SCREEN: CPT

## 2020-09-16 PROCEDURE — 6370000000 HC RX 637 (ALT 250 FOR IP): Performed by: INTERNAL MEDICINE

## 2020-09-16 PROCEDURE — 36430 TRANSFUSION BLD/BLD COMPNT: CPT

## 2020-09-16 PROCEDURE — 82948 REAGENT STRIP/BLOOD GLUCOSE: CPT

## 2020-09-16 PROCEDURE — 85025 COMPLETE CBC W/AUTO DIFF WBC: CPT

## 2020-09-16 PROCEDURE — XW033E5 INTRODUCTION OF REMDESIVIR ANTI-INFECTIVE INTO PERIPHERAL VEIN, PERCUTANEOUS APPROACH, NEW TECHNOLOGY GROUP 5: ICD-10-PCS | Performed by: INTERNAL MEDICINE

## 2020-09-16 PROCEDURE — XW13325 TRANSFUSION OF CONVALESCENT PLASMA (NONAUTOLOGOUS) INTO PERIPHERAL VEIN, PERCUTANEOUS APPROACH, NEW TECHNOLOGY GROUP 5: ICD-10-PCS | Performed by: INTERNAL MEDICINE

## 2020-09-16 PROCEDURE — 94761 N-INVAS EAR/PLS OXIMETRY MLT: CPT

## 2020-09-16 PROCEDURE — 96365 THER/PROPH/DIAG IV INF INIT: CPT

## 2020-09-16 PROCEDURE — 80048 BASIC METABOLIC PNL TOTAL CA: CPT

## 2020-09-16 PROCEDURE — 86901 BLOOD TYPING SEROLOGIC RH(D): CPT

## 2020-09-16 PROCEDURE — 85610 PROTHROMBIN TIME: CPT

## 2020-09-16 PROCEDURE — 2580000003 HC RX 258: Performed by: STUDENT IN AN ORGANIZED HEALTH CARE EDUCATION/TRAINING PROGRAM

## 2020-09-16 PROCEDURE — 2500000003 HC RX 250 WO HCPCS: Performed by: STUDENT IN AN ORGANIZED HEALTH CARE EDUCATION/TRAINING PROGRAM

## 2020-09-16 PROCEDURE — 86900 BLOOD TYPING SEROLOGIC ABO: CPT

## 2020-09-16 RX ORDER — 0.9 % SODIUM CHLORIDE 0.9 %
30 INTRAVENOUS SOLUTION INTRAVENOUS DAILY
Status: DISCONTINUED | OUTPATIENT
Start: 2020-09-16 | End: 2020-09-18 | Stop reason: HOSPADM

## 2020-09-16 RX ORDER — SERTRALINE HYDROCHLORIDE 100 MG/1
100 TABLET, FILM COATED ORAL DAILY
Status: DISCONTINUED | OUTPATIENT
Start: 2020-09-16 | End: 2020-09-18 | Stop reason: HOSPADM

## 2020-09-16 RX ORDER — 0.9 % SODIUM CHLORIDE 0.9 %
20 INTRAVENOUS SOLUTION INTRAVENOUS ONCE
Status: COMPLETED | OUTPATIENT
Start: 2020-09-16 | End: 2020-09-16

## 2020-09-16 RX ORDER — VITAMIN B COMPLEX
1000 TABLET ORAL 2 TIMES DAILY
Status: DISCONTINUED | OUTPATIENT
Start: 2020-09-16 | End: 2020-09-18 | Stop reason: HOSPADM

## 2020-09-16 RX ORDER — FERROUS SULFATE 325(65) MG
325 TABLET ORAL
Status: DISCONTINUED | OUTPATIENT
Start: 2020-09-16 | End: 2020-09-18 | Stop reason: HOSPADM

## 2020-09-16 RX ORDER — TRAZODONE HYDROCHLORIDE 50 MG/1
50 TABLET ORAL NIGHTLY PRN
Status: DISCONTINUED | OUTPATIENT
Start: 2020-09-16 | End: 2020-09-18 | Stop reason: HOSPADM

## 2020-09-16 RX ORDER — ATORVASTATIN CALCIUM 40 MG/1
40 TABLET, FILM COATED ORAL NIGHTLY
Status: DISCONTINUED | OUTPATIENT
Start: 2020-09-16 | End: 2020-09-18 | Stop reason: HOSPADM

## 2020-09-16 RX ADMIN — ISOSORBIDE MONONITRATE 120 MG: 60 TABLET ORAL at 06:54

## 2020-09-16 RX ADMIN — VERAPAMIL HYDROCHLORIDE 240 MG: 240 TABLET, FILM COATED, EXTENDED RELEASE ORAL at 06:54

## 2020-09-16 RX ADMIN — SODIUM CHLORIDE, PRESERVATIVE FREE 10 ML: 5 INJECTION INTRAVENOUS at 20:30

## 2020-09-16 RX ADMIN — FERROUS SULFATE TAB 325 MG (65 MG ELEMENTAL FE) 325 MG: 325 (65 FE) TAB at 11:10

## 2020-09-16 RX ADMIN — CLONIDINE HYDROCHLORIDE 0.3 MG: 0.2 TABLET ORAL at 06:54

## 2020-09-16 RX ADMIN — REMDESIVIR 200 MG: 100 INJECTION, POWDER, LYOPHILIZED, FOR SOLUTION INTRAVENOUS at 17:10

## 2020-09-16 RX ADMIN — SODIUM CHLORIDE 20 ML: 9 INJECTION, SOLUTION INTRAVENOUS at 11:10

## 2020-09-16 RX ADMIN — Medication 1000 UNITS: at 11:10

## 2020-09-16 RX ADMIN — DOXAZOSIN 8 MG: 4 TABLET ORAL at 06:54

## 2020-09-16 RX ADMIN — SERTRALINE HYDROCHLORIDE 100 MG: 100 TABLET, FILM COATED ORAL at 11:10

## 2020-09-16 RX ADMIN — CLONIDINE HYDROCHLORIDE 0.3 MG: 0.2 TABLET ORAL at 16:14

## 2020-09-16 RX ADMIN — SODIUM CHLORIDE, PRESERVATIVE FREE 10 ML: 5 INJECTION INTRAVENOUS at 06:52

## 2020-09-16 RX ADMIN — ASPIRIN 81 MG: 81 TABLET ORAL at 06:54

## 2020-09-16 RX ADMIN — FAMOTIDINE 20 MG: 20 TABLET, FILM COATED ORAL at 07:36

## 2020-09-16 RX ADMIN — CLONIDINE HYDROCHLORIDE 0.3 MG: 0.2 TABLET ORAL at 20:28

## 2020-09-16 RX ADMIN — ATORVASTATIN CALCIUM 40 MG: 40 TABLET, FILM COATED ORAL at 20:29

## 2020-09-16 RX ADMIN — SODIUM CHLORIDE 30 ML: 9 INJECTION, SOLUTION INTRAVENOUS at 16:10

## 2020-09-16 RX ADMIN — DOXAZOSIN 8 MG: 4 TABLET ORAL at 20:28

## 2020-09-16 RX ADMIN — Medication 1000 UNITS: at 20:29

## 2020-09-16 RX ADMIN — TRAZODONE HYDROCHLORIDE 50 MG: 50 TABLET ORAL at 23:31

## 2020-09-16 RX ADMIN — FERROUS SULFATE TAB 325 MG (65 MG ELEMENTAL FE) 325 MG: 325 (65 FE) TAB at 16:14

## 2020-09-16 ASSESSMENT — PAIN SCALES - GENERAL
PAINLEVEL_OUTOF10: 0

## 2020-09-16 NOTE — FLOWSHEET NOTE
09/16/20 0542   Provider Notification   Reason for Communication Review case   Provider Name [facility]   Provider Notification Advance Practice Clinician (CNS, NP, CNM, CRNA, PA)   Method of Communication Secure Message   Response Waiting for response   Notification Time 0542   message sent regarding pt blood pressure at this time, awaiting response at this time.

## 2020-09-16 NOTE — PLAN OF CARE
Problem: Falls - Risk of:  Goal: Will remain free from falls  Description: Will remain free from falls  Outcome: Ongoing  Note: Patient remained free from falls this shift. Bed is in low position with alarm on and siderails up x2. Education given on use of call light and patient voiced understanding. Call light and beside table within reach. Arm band and falling star in place. Hourly rounds completed. Will continue to monitor. Problem: Airway Clearance - Ineffective  Goal: Achieve or maintain patent airway  Outcome: Ongoing  Note: Pt has been able to maintain a patent airway at this time, will continue to monitor. Problem: Gas Exchange - Impaired  Goal: Absence of hypoxia  Outcome: Ongoing  Note: No signs of hypoxia this shift, pt spo2 remains above 90% on room air. Problem: Breathing Pattern - Ineffective  Goal: Ability to achieve and maintain a regular respiratory rate  Outcome: Ongoing  Note: Pt has been able to maintain a regular respiratory rate this shift. Problem: Body Temperature -  Risk of, Imbalanced  Goal: Ability to maintain a body temperature within defined limits  Outcome: Ongoing  Note: Pt has been able to maintain a body temperature that is within defined limits. Problem: Isolation Precautions - Risk of Spread of Infection  Goal: Prevent transmission of infection  Outcome: Ongoing  Note: Pt remains in droplet plus precautions at this time. Problem: Nutrition Deficits  Goal: Optimize nutrtional status  Outcome: Ongoing  Note: Pt is currently on a renal diet. Problem: Risk for Fluid Volume Deficit  Goal: Maintain normal heart rhythm  Outcome: Ongoing  Note: Patient NSR on telemetry monitor. No changes in cardiac rhythm noted with each tele strip reading. No evidence of DVT this shift. Problem: Pain:  Goal: Control of acute pain  Description: Control of acute pain  Outcome: Ongoing  Note: Patient complained of no pain rating it a 0 on the 0-10 scale.  Pain medication given as ordered and needed. Patient voiced satisfaction with this. Also instructed patient on distraction, repositioning, and ambulation as non-pharmacological methods of pain relief. Patient voiced understanding.

## 2020-09-16 NOTE — CARE COORDINATION
DISCHARGE PLANNING EVALUATION: OP/OBSERVATION        9/16/20, 1:51 PM EDT    Myron Diggs       Admitted from: ED 9/15/2020/ 6287   Location: [facility]05/005- Reason for admit: Volume overload [E87.70]   Admit order signed?: yes    Procedure:   9/15 CXR:   1. Worsening hazy appearance in the mid and lower lung zones suggesting pulmonary edema. There is also right upper lobe more focal opacity. This can also represent pulmonary edema. Pneumonia is not excluded.         2. Bilateral pleural effusions. These are small. The right-sided pleural effusion has slightly increased in size. Pertinent Info/Orders/Treatment Plan: Presented with c/o mild dyspnea, swelling in BLE's, and weight gain. He had skipped dialysis, his last treatment was 5 days prior, because [facility] was requiring him to go to [address] [place] [address] for treatments since his wife was +COVID 19. He was in fluid overload. Patient tested +COVID 19. Admitted to Observation status on  COVID unit. Nephrology consulted. HD yesterday with 4.3L removed. HD again today with 3L removed. Plan for convalescent plasma today. Afebrile. NSR. On room air. Ox4. Lipitor, catapres, cardura, pepcid, ferrous sulfate, imdur, IV remdesivir, zoloft, verapamil. BUN 97 - now 48, Creat 14.7 - now 9, AG 19 - now 12, phos 4.9, alb 3, wbc 3.6 - now 3.1, hgb 9.4 - now 9, plt 71 - now 79. PCP: Steffanie Polanco MD    Patient Goals/Plan/Treatment Preferences: Spoke with Fallon Berg; states he lives at home with his wife and did not use any DME or have any  services PTA. He does not drive, he finds a ride from family or friends to appointments, and his PCP is Dr. Kelly Jenkins at [facility] clinic. Fallon Berg states he was going to [facility] for HD, but when he called to let them know his wife was +COVID, they told him he couldn't come back there, he needed to go to [facility] and made him an appointment for the next morning at 6:15am. He states he couldn't get a ride that soon for dialysis in [facility].  He states Cheryl Lloyd was rude to him and he doesn't even want to go back to dialysis at [facility]. He states he thinks he will try to get into a different dialysis center, maybe [facility], but states he will do that on his own, we don't need to do that for him. Explained that his Nephrologist may not go to [facility]; he states then he would find a different one if he needs to. Asked if he is willing to go to [address] [place] [address] for dialysis in the meantime and he states he will if we can find him transportation to and from dialysis. Explained that his medicaid card has a number on the back for transportation to appointments and he just needs to call that number to set up transport. He states he will not call them, we can. He then states if we set up the first transport, then give him the information on how to set up future transport, he is agreeable to go to HD at [address] [place] [address]. Sabrina Parsons states he plans to return home at discharge, denies any further needs, and declines HH. Spoke with MICHAEL regarding transport for HD. Transportation/Food Security/Housekeeping Addressed:  No issues identified. [address] [place] [address] and spoke with Ginny; confirmed patient has arrival time of 0615 and chair time of 0630 on T-R-Sa schedule.  Ginny updated that patient will not be at HD tomorrow morning as he is in the hospital.

## 2020-09-16 NOTE — PROGRESS NOTES
WBC 3.6* 3.1*   HGB 9.4* 9.0*   PLT 71* 79*     CMP:    Recent Labs     09/15/20  0906 09/16/20  0607    137   K 4.7 4.9    102   CO2 20* 23   BUN 97* 48*   CREATININE 14.7* 9.0*   GLUCOSE 102 92   CALCIUM 8.1* 8.2*   LABGLOM 4* 7*     Troponin: No results for input(s): TROPONINI in the last 72 hours. BNP: No results for input(s): BNP in the last 72 hours. INR: No results for input(s): INR in the last 72 hours. Lipids: No results for input(s): CHOL, LDLDIRECT, TRIG, HDL, AMYLASE, LIPASE in the last 72 hours. Liver: No results for input(s): AST, ALT, ALKPHOS, PROT, LABALBU, BILITOT in the last 72 hours. Invalid input(s): BILDIR  Iron:  No results for input(s): IRONS, FERRITIN in the last 72 hours. Invalid input(s): LABIRONS  XR CHEST PORTABLE   Final Result   1. Worsening hazy appearance in the mid and lower lung zones suggesting pulmonary edema. There is also right upper lobe more focal opacity. This can also represent pulmonary edema. Pneumonia is not excluded. 2. Bilateral pleural effusions. These are small. The right-sided pleural effusion has slightly increased in size. **This report has been created using voice recognition software. It may contain minor errors which are inherent in voice recognition technology. **      Final report electronically signed by Dr. Giorgio Waddell on 9/15/2020 9:38 AM            Objective:   Vitals: BP (!) 155/76   Pulse 91   Temp 97.8 °F (36.6 °C) (Oral)   Resp 19   Ht 6' (1.829 m)   Wt 218 lb 14.4 oz (99.3 kg)   SpO2 92%   BMI 29.69 kg/m²    Wt Readings from Last 3 Encounters:   09/16/20 218 lb 14.4 oz (99.3 kg)   09/02/20 215 lb (97.5 kg)   07/30/20 214 lb 8.1 oz (97.3 kg)      24HR INTAKE/OUTPUT:      Intake/Output Summary (Last 24 hours) at 9/16/2020 0735  Last data filed at 9/16/2020 0402  Gross per 24 hour   Intake 800 ml   Output 5056 ml   Net -4256 ml       Constitutional:  Alert, awake, no apparent distress   Skin:normal with no rash or lesions. HEENT:Pupils are reactive . Throat is clear . Oral mucosa is moist   Neck:supple with no carotid bruit or JVD. Cardiovascular:  S1, S2 without murmur or rubs   Respiratory: Bilateral crackles  Abdomen: +bs, soft, no tenderness to palpation and no palpable mass. No abdominal bruit   Ext: Trace to 1+ bilateral LE edema. Left brachiocephalic fistula is noted. It has good bruit and good thrill. Musculoskeletal:Intact  Neuro:Alert and awake. Well oriented  with no focal deficit. Speech is normal      Electronically signed by Cherelle Villalta MD on 9/16/2020 at 7:35 AM

## 2020-09-16 NOTE — PROGRESS NOTES
CRITICAL CARE PROGRESS NOTE      Patient:  Jaja Hanks    Unit/Bed:4B-05/005-A  YOB: 1965  MRN: [id number]   PCP: Tabitha Cardenas MD  Date of Admission: 9/15/2020  Chief Complaint:-COVID Infection      Assessment and Plan:    1. COVID-19: Patient is considered mild to moderate in severity of COVID-19 infection, he is not currently requiring any oxygen supplementation. We will proceed with treating the patient with remdesivir which needs to be given after dialysis, as well as convalescent plasma which also needs to be given after dialysis. Start aspirin 81 mg daily to treat/prevent microthrombosis. Recommend that an [address] filter for dialysis to be used in light of current COVID infection. 2. Acute on chronic diastolic congestive heart failure: Exacerbation due to fluid overload. Patient is noncompliant and has missed multiple appointments with dialysis. Patient has chronically elevated troponins, due to end-stage renal disease. Pulmonary edema was noted on chest x-ray 9/15/20. Patient received emergent dialysis yesterday, and receiving dialysis today. 3. End-stage renal disease: Patient currently on hemodialysis. Typical schedule is Tuesday, Thursday, Saturday, however patient has missed multiple appointments with dialysis secondary to new travel requirement given patient's wife was positive for COVID. Continue patient on renal diet. Continue vitamin D 1000 units BID  4. Secondary hyperparathyroidism: Due to end-stage renal disease, nephrology following  5. Chronic macrocytic anemia: Due to end-stage renal disease. Continue ferrous sulfate 325 mg 3 times daily  6. History of hypertension: Continue with clonidine 0.3mg 3 times daily, Imdur 120 mg daily, verapamil 240 mg daily, Cardura 8 mg twice daily  7. History of atrial flutter: s/p ALEISHA 3/2018 normal sinus rhythm-no OAC due to history of aneurysm. Mild tricuspid regurgitation   8.  History of thoracic and abdominal aortic aneurysm fever, loss of taste or smell, nausea, vomiting, diarrhea. While in the emergency room department, chest x-ray demonstrated pulmonary vascular congestion as well as recent history of his wife being: Positive he was tested and indeed was positive. Patient states on , he felt very ill however did not have a fever. Patient states he just felt overall fatigued and lethargic. Patient admits to cough with sputum production that is even clear or dark brown blood-tinged. Past Medical History: End-stage renal disease, depression, diabetes mellitus, cocaine abuse, anemia associated with chronic renal failure, arthritis, hyperlipidemia, hypertension, hyperphosphatemia, PTSD, secondary hyperparathyroidism, sleep apnea, AAA  Family History: Mother:  cancer. Brother:  aneurysm. Father:  murdered  Social History: Current smoker, denies alcohol, history of cocaine use  with 2 children,     ROS   Patient admits to a fullness feeling resulting in cough, with sputum production that is either clear or brown/blood-tinged. Patient also admits to shortness of breath which is exacerbated by movement as well as laying down flat. Scheduled Meds:   sodium chloride  20 mL Intravenous Once    aspirin  81 mg Oral Daily    famotidine  20 mg Oral Every Other Day    sodium chloride flush  10 mL Intravenous 2 times per day    cloNIDine  0.3 mg Oral TID    isosorbide mononitrate  120 mg Oral Daily    verapamil  240 mg Oral Daily    doxazosin  8 mg Oral 2 times per day     Continuous Infusions:    PHYSICAL EXAMINATION:  T: 97.8. P: 91. RR: 19. B/P: 155/76. O2 Sat: 92.  I/O:  100/0  Body mass index is 29.69 kg/m². GCS:   15  General:   Chronically ill-appearing male, no acute distress, sitting up in chair  HEENT:  normocephalic and atraumatic. PERR  Neck: supple. No Thyromegaly. Lungs: Diminished breath sounds bilaterally, crackles noted on auscultation.  no retractions  Cardiac: Regular rate, regular rhythm with murmur noted. Jugular venous distention noted  Abdomen: soft. Nontender. Bowel sounds positive  Extremities:  No clubbing or cyanosis. Trace bilateral lower extremity edema  Vasculature: capillary refill < 3 seconds. Palpable dorsalis pedis pulses. Skin:  warm and dry. Psych:  Alert and oriented x3. Affect appropriate  Lymph:  No supraclavicular adenopathy. Neurologic:  No focal deficit. No seizures. Data: (All radiographs, tracings, PFTs, and imaging are personally viewed and interpreted unless otherwise noted).  Sodium 137, potassium 4.9, chloride 102, CO2 23, BUN 48, creatinine 9.0, calcium 8.2   WBC 3.1, hemoglobin 9.0, hematocrit 28.7, platelets 97   Telemetry shows normal sinus rhythm    Chest x-ray 9/15/20 reports: 1. Worsening hazy appearance in the mid and lower lung zones suggestive of pulmonary edema. There is also right upper lobe more focal opacity. This could also represent pulmonary edema. Pneumonia is not excluded. 2.  Bilateral pleural effusions. Right-sided pleural effusion is slightly increased in size   EKG 9/15/20 reports: Normal sinus rhythm. Possible Left atrial enlargement. Left axis deviation. LVH. T wave abnormality, consider lateral ischemia. Prolonged QT interval or tu fusion, consider myocardial disease, electrolyte imbalance, or drug effects. Abnormal ECG. When compared with ECG of 02-SEP-2020 03:27, No significant change was found        Meets Continued ICU Level Care Criteria:    [x] Yes   [] No - Transfer Planned to listed location:  [] HOSPITALIST CONTACTED-      Case and plan discussed with Dr. Nader Constantino. Electronically signed by Samy Means DO  CRITICAL CARE SPECIALIST  Patient seen by me. Case discussed with resident physician. Patient without dyspnea. Patient without fever or nausea. Still requiring low-flow oxygen. Awaiting convalescent plasma to arrive.   Likely will administer tomorrow. Initiate Remdesivir. .  Electronically signed by Blanche Collet Lucianne Lips MD.

## 2020-09-16 NOTE — FLOWSHEET NOTE
09/16/20 0745 09/16/20 1159   Vital Signs   BP (!) 121/59 (!) 128/99   Temp 97.9 °F (36.6 °C) 97.3 °F (36.3 °C)   Pulse 94 85   Resp 20 17   SpO2  --  94 %   Weight 220 lb 14.4 oz (100.2 kg) 214 lb 8.1 oz (97.3 kg)   Post-Hemodialysis Assessment   Post-Treatment Procedures  --  Blood returned; Access bleeding time < 10 minutes   Machine Disinfection Process  --  Acid/Vinegar Clean;Heat Disinfect; Exterior Machine Disinfection   Total Liters Processed (l/min)  --  80 l/min   Dialyzer Clearance  --  Moderately streaked   Duration of Treatment (minutes)  --  210 minutes   Hemodialysis Intake (ml)  --  600 ml   Hemodialysis Output (ml)  --  3600 ml   NET Removed (ml)  --  3000 ml   Tolerated Treatment  --  Good   3.5hr tx completed without complications. Tolerated 3L fluid removal well. Pressure held to each site x10 min. Dressing clean dry and intact upon leaving pt bedside. Report given to primary nurse. tx to be scanned into EMR.

## 2020-09-17 PROBLEM — U07.1 COVID-19: Status: ACTIVE | Noted: 2020-09-17

## 2020-09-17 LAB
ALBUMIN SERPL-MCNC: 3.2 G/DL (ref 3.5–5.1)
ALP BLD-CCNC: 61 U/L (ref 38–126)
ALT SERPL-CCNC: 6 U/L (ref 11–66)
ANION GAP SERPL CALCULATED.3IONS-SCNC: 11 MEQ/L (ref 8–16)
AST SERPL-CCNC: 10 U/L (ref 5–40)
BASOPHILS # BLD: 0.4 %
BASOPHILS ABSOLUTE: 0 THOU/MM3 (ref 0–0.1)
BILIRUB SERPL-MCNC: 0.3 MG/DL (ref 0.3–1.2)
BUN BLDV-MCNC: 35 MG/DL (ref 7–22)
CALCIUM SERPL-MCNC: 8.1 MG/DL (ref 8.5–10.5)
CHLORIDE BLD-SCNC: 96 MEQ/L (ref 98–111)
CO2: 27 MEQ/L (ref 23–33)
CREAT SERPL-MCNC: 7.3 MG/DL (ref 0.4–1.2)
EOSINOPHIL # BLD: 2.9 %
EOSINOPHILS ABSOLUTE: 0.1 THOU/MM3 (ref 0–0.4)
ERYTHROCYTE [DISTWIDTH] IN BLOOD BY AUTOMATED COUNT: 12.9 % (ref 11.5–14.5)
ERYTHROCYTE [DISTWIDTH] IN BLOOD BY AUTOMATED COUNT: 46 FL (ref 35–45)
GFR SERPL CREATININE-BSD FRML MDRD: 10 ML/MIN/1.73M2
GLUCOSE BLD-MCNC: 87 MG/DL (ref 70–108)
HCT VFR BLD CALC: 29.3 % (ref 42–52)
HEMOGLOBIN: 9.2 GM/DL (ref 14–18)
IMMATURE GRANS (ABS): 0.01 THOU/MM3 (ref 0–0.07)
IMMATURE GRANULOCYTES: 0.4 %
LYMPHOCYTES # BLD: 22.5 %
LYMPHOCYTES ABSOLUTE: 0.6 THOU/MM3 (ref 1–4.8)
MCH RBC QN AUTO: 30.9 PG (ref 26–33)
MCHC RBC AUTO-ENTMCNC: 31.4 GM/DL (ref 32.2–35.5)
MCV RBC AUTO: 98.3 FL (ref 80–94)
MONOCYTES # BLD: 10.9 %
MONOCYTES ABSOLUTE: 0.3 THOU/MM3 (ref 0.4–1.3)
NUCLEATED RED BLOOD CELLS: 0 /100 WBC
PLATELET # BLD: 86 THOU/MM3 (ref 130–400)
PMV BLD AUTO: 10.7 FL (ref 9.4–12.4)
POTASSIUM SERPL-SCNC: 4.5 MEQ/L (ref 3.5–5.2)
RBC # BLD: 2.98 MILL/MM3 (ref 4.7–6.1)
SEG NEUTROPHILS: 62.9 %
SEGMENTED NEUTROPHILS ABSOLUTE COUNT: 1.8 THOU/MM3 (ref 1.8–7.7)
SODIUM BLD-SCNC: 134 MEQ/L (ref 135–145)
TOTAL PROTEIN: 6.6 G/DL (ref 6.1–8)
WBC # BLD: 2.8 THOU/MM3 (ref 4.8–10.8)

## 2020-09-17 PROCEDURE — 6370000000 HC RX 637 (ALT 250 FOR IP): Performed by: NURSE PRACTITIONER

## 2020-09-17 PROCEDURE — 36415 COLL VENOUS BLD VENIPUNCTURE: CPT

## 2020-09-17 PROCEDURE — 85025 COMPLETE CBC W/AUTO DIFF WBC: CPT

## 2020-09-17 PROCEDURE — 93005 ELECTROCARDIOGRAM TRACING: CPT | Performed by: NURSE PRACTITIONER

## 2020-09-17 PROCEDURE — 80053 COMPREHEN METABOLIC PANEL: CPT

## 2020-09-17 PROCEDURE — 2580000003 HC RX 258: Performed by: STUDENT IN AN ORGANIZED HEALTH CARE EDUCATION/TRAINING PROGRAM

## 2020-09-17 PROCEDURE — 2500000003 HC RX 250 WO HCPCS: Performed by: STUDENT IN AN ORGANIZED HEALTH CARE EDUCATION/TRAINING PROGRAM

## 2020-09-17 PROCEDURE — 99232 SBSQ HOSP IP/OBS MODERATE 35: CPT | Performed by: INTERNAL MEDICINE

## 2020-09-17 PROCEDURE — 2100000000 HC CCU R&B

## 2020-09-17 PROCEDURE — 6370000000 HC RX 637 (ALT 250 FOR IP): Performed by: STUDENT IN AN ORGANIZED HEALTH CARE EDUCATION/TRAINING PROGRAM

## 2020-09-17 PROCEDURE — 6370000000 HC RX 637 (ALT 250 FOR IP): Performed by: INTERNAL MEDICINE

## 2020-09-17 PROCEDURE — 99233 SBSQ HOSP IP/OBS HIGH 50: CPT | Performed by: INTERNAL MEDICINE

## 2020-09-17 PROCEDURE — 90935 HEMODIALYSIS ONE EVALUATION: CPT

## 2020-09-17 RX ADMIN — SERTRALINE HYDROCHLORIDE 100 MG: 100 TABLET, FILM COATED ORAL at 09:07

## 2020-09-17 RX ADMIN — CLONIDINE HYDROCHLORIDE 0.3 MG: 0.2 TABLET ORAL at 15:33

## 2020-09-17 RX ADMIN — VERAPAMIL HYDROCHLORIDE 240 MG: 240 TABLET, FILM COATED, EXTENDED RELEASE ORAL at 06:26

## 2020-09-17 RX ADMIN — DOXAZOSIN 8 MG: 4 TABLET ORAL at 06:27

## 2020-09-17 RX ADMIN — CLONIDINE HYDROCHLORIDE 0.3 MG: 0.2 TABLET ORAL at 20:22

## 2020-09-17 RX ADMIN — FERROUS SULFATE TAB 325 MG (65 MG ELEMENTAL FE) 325 MG: 325 (65 FE) TAB at 11:32

## 2020-09-17 RX ADMIN — FERROUS SULFATE TAB 325 MG (65 MG ELEMENTAL FE) 325 MG: 325 (65 FE) TAB at 09:07

## 2020-09-17 RX ADMIN — FERROUS SULFATE TAB 325 MG (65 MG ELEMENTAL FE) 325 MG: 325 (65 FE) TAB at 17:11

## 2020-09-17 RX ADMIN — ASPIRIN 81 MG: 81 TABLET ORAL at 09:07

## 2020-09-17 RX ADMIN — DOXAZOSIN 8 MG: 4 TABLET ORAL at 20:22

## 2020-09-17 RX ADMIN — Medication 1000 UNITS: at 20:22

## 2020-09-17 RX ADMIN — CLONIDINE HYDROCHLORIDE 0.3 MG: 0.2 TABLET ORAL at 06:26

## 2020-09-17 RX ADMIN — ATORVASTATIN CALCIUM 40 MG: 40 TABLET, FILM COATED ORAL at 20:22

## 2020-09-17 RX ADMIN — ISOSORBIDE MONONITRATE 120 MG: 60 TABLET ORAL at 06:26

## 2020-09-17 RX ADMIN — Medication 1000 UNITS: at 09:07

## 2020-09-17 ASSESSMENT — PAIN SCALES - GENERAL
PAINLEVEL_OUTOF10: 0

## 2020-09-17 NOTE — CARE COORDINATION
9/17/20, 11:53 AM EDT    DISCHARGE PLANNING EVALUATION    Order received from [employer] CM to arrange for pts first visit to [place] [address]. [employer] reports pt does not drive or have the funds to pay for transportation. [place] set up chair time for this Saturday at 6:15 am. [employer] informed pt that he will need to call his Medicaid transportation number on back of card for future trips for the next 2 weeks or until test neg for Covid. [employer] states pt verbalized understanding. MICHAEL spoke with LACP for [employer], trip arranged through [employer], will  pt at 5:15 am on Saturday. MICHAEL met with pt to provide update, pt agreeable to [employer] transporting at 5:15 am on Saturday.

## 2020-09-17 NOTE — FLOWSHEET NOTE
09/17/20 0728 09/17/20 1215   Vital Signs   BP (!) 157/75 134/61   Temp 97.7 °F (36.5 °C) 97.9 °F (36.6 °C)   Weight 219 lb 9.3 oz (99.6 kg) 214 lb 15.2 oz (97.5 kg)   Weight Method Bed scale Bed scale   Percent Weight Change 0 -2.11   Post-Hemodialysis Assessment   Post-Treatment Procedures  --  Blood returned; Access bleeding time < 10 minutes   Machine Disinfection Process  --  Acid/Vinegar Clean;Heat Disinfect; Exterior Machine Disinfection   Rinseback Volume (ml)  --  400 ml   Total Liters Processed (l/min)  --  112.9 l/min   Dialyzer Clearance  --  Lightly streaked   Duration of Treatment (minutes)  --  270 minutes   Heparin amount administered during treatment (units)  --  0 units   Hemodialysis Intake (ml)  --  400 ml   Hemodialysis Output (ml)  --  1900 ml   NET Removed (ml)  --  1500 ml   Tolerated Treatment  --  Good   Stable 4.5 hour treatment. 1.5L removed during dialysis. Both sites held x10 min each. Dressings dry and intact. Report given to primary RN.  Treatment record printed for scanning

## 2020-09-17 NOTE — PLAN OF CARE
Demonstrate positive use of time alone when socialization is not possible  Outcome: Ongoing  Note: Patient uses alone time positively by watching television and communicating with family on his cell phone. Problem: Fatigue  Goal: Verbalize increase energy and improved vitality  Outcome: Ongoing  Note: No complaints of increased fatigue. Problem: Pain:  Goal: Pain level will decrease  Description: Pain level will decrease  Outcome: Ongoing  Note: Pain Assessment: 0-10  Pain Level: 0   Patient's Stated Pain Goal: No pain   Is pain goal met at this time? Yes           Care plan reviewed with patient. Patient verbalize understanding of the plan of care and contribute to goal setting.

## 2020-09-17 NOTE — PROGRESS NOTES
Renal Progress Note    Assessment and Plan:    1. End stage kidney disease on hemodialysis. 2.  Pulmonary edema much improved  3. Volume overload  4. SARS-CoV-2 positive status  5. Hypertension primary improved  6. Pancytopenia   7. Hyponatremia from end-stage kidney disease  PLAN:  I discussed my thoughts with the patient. He understood. Addressed his questions.    Labs reviewed  Medications reviewed  No changes  Hemodialysis today to bring him back to his normal day  Discussed with Patient and staff  We will follow        Patient Active Problem List:     FSGS (focal segmental glomerulosclerosis)     Diabetes mellitus type 2, controlled ([address]     Monoclonal (M) protein disease, multiple 'M' protein     Depression     PTSD (post-traumatic stress disorder)     Secondary renal hyperparathyroidism ([address]     Cocaine use     Substance induced mood disorder ([address]     Renal artery stenosis (HCC) with uncontrollable hypertension     Chronic alcoholism (HCC)     Severe cocaine use disorder (HCC)     Essential hypertension     Uncontrolled hypertension     LVH (left ventricular hypertrophy) due to hypertensive disease     Acute on chronic diastolic CHF (congestive heart failure) (HCC)     REZA (obstructive sleep apnea)     Headache, unspecified headache type     FH: HTN (hypertension)     Hypertrophic cardiomyopathy ([address]     Diet-controlled diabetes mellitus ([address]     A-V fistula ([address]     Chronic thoracic aortic dissection (HCC)     Hyperphosphatemia     Anemia in CKD (chronic kidney disease)     Vitamin D deficiency     Thrombocytopenia (HCC)     Personality disorder (HCC)     Tobacco abuse     Metabolic acidosis     Pneumonia due to organism     Precordial pain     ESRD on hemodialysis (HCC)     Edema of upper extremity     ESRD (end stage renal disease) ([address]     Hyperglycemia     Cocaine abuse, continuous - reports spending $100 on cocaine \"every other day\"     Homelessness     Moderate episode of recurrent major depressive disorder [facility]     Noncompliance of patient with renal dialysis ([facility]     Anemia associated with stage 5 chronic renal failure ([facility]     Hypertensive emergency     Cocaine use disorder, severe, dependence ([facility]     Alcohol use disorder, severe, dependence ([facility]     Abdominal aortic aneurysm (AAA) without rupture (HCC)     Atrial flutter with rapid ventricular response (HCC)     PVD (peripheral vascular disease) (HCC)     Dyslipidemia     Other fluid overload     Laceration of flexor muscle, fascia and tendon of left thumb at forearm level, initial encounter     Acute respiratory failure with hypoxia (HCC)     Acute pulmonary edema (HCC)     Macrocytic anemia     History of aortic aneurysm repair     Medical non-compliance     Volume overload     Chest pain     Dyspnea     Pancytopenia (HCC)     Acute febrile illness     Hyponatremia     Hypermagnesemia     History of atrial flutter     History of alcohol abuse     History of cocaine abuse (HCC)     Mild tricuspid regurgitation     Uremia, acute     COVID-19 virus detected      Subjective:   Admit Date: 9/15/2020    Interval History:   Seen and examined. Seen for end-stage kidney disease and volume overload. Awake and alert. Doing well  Updated by the staff. No new issues.   Blood pressure is better      Medications:   Scheduled Meds:   atorvastatin  40 mg Oral Nightly    sertraline  100 mg Oral Daily    Vitamin D  1,000 Units Oral BID    ferrous sulfate  325 mg Oral TID WC    phosphorus replacement protocol   Other RX Placeholder    calcium replacement protocol   Other RX Placeholder    remdesivir IVPB  100 mg Intravenous Q24H    sodium chloride  30 mL Intravenous Daily    aspirin  81 mg Oral Daily    famotidine  20 mg Oral Every Other Day    sodium chloride flush  10 mL Intravenous 2 times per day    cloNIDine  0.3 mg Oral TID    isosorbide mononitrate  120 mg Oral Daily    verapamil  240 mg Oral Daily    doxazosin  8 mg Oral 2 times 9/17/2020 0409  Gross per 24 hour   Intake 662 ml   Output 0 ml   Net 662 ml       Constitutional:  Alert, awake, no apparent distress   Skin:normal with no rash or lesions. HEENT:Pupils are reactive . Throat is clear . Oral mucosa is moist   Neck:supple with no carotid bruit or JVD. Cardiovascular:  S1, S2 without murmur or rubs   Respiratory: Bilateral crackles  Abdomen: +bs, soft, no tenderness to palpation and no palpable mass. No abdominal bruit   Ext: Trace to 1+ bilateral LE edema. Left brachiocephalic fistula is noted. It has good bruit and good thrill. Musculoskeletal:Intact  Neuro:Alert and awake. Well oriented  with no focal deficit. Speech is normal      Electronically signed by Delmy Calero MD on 9/17/2020 at 12:22 PM

## 2020-09-17 NOTE — PROGRESS NOTES
aortic aneurysm with dissection:  S/p thoracoabdominal endograft repair in [facility] 7/2018. TEVAR of a Stephenson type B chronic dissection with left carotid subclavian bypass 7/2018. Last CTA of chest 2/2020 demonstrated native thoracic aortic aneurysm slightly larger than previous CT. Its lumen likely containing previous hemorrhage. There is an endograft without gross evidence of endoleak  9. History of hyperlipidemia: Continue Lipitor 40 mg nightly  10. History of GERD: Continue Pepcid 20 mg every other day  11. History of type 2 diabetes: Patient's glucose has been within normal limits. We will continue to monitor with Accu-Cheks TID and initiate therapy when appropriate    12. History of depression: Continue with Zoloft 100 mg daily,  trazodone 50 mg nightly for sleep  13. History of sleep apnea: Not on any Pap therapy at this time  14. History of cocaine and alcohol abuse: No urine drug screen/EtOH levels drawn on admission    INITIAL H AND P AND ICU COURSE:  26-year-old male with a significant past medical history of end-stage renal disease on hemodialysis, hypertension, anemia of chronic disease, secondary hyperparathyroidism, hyperphosphatemia, diabetes mellitus, cocaine abuse, aortic aneurysm, and depression presented to [address] [place] [address] emergency room for evaluation of fluid overload. Patient states he is a dialysis patient and receives dialysis on Tuesday, Thursday, Saturday. Patient states his last dialysis treatment was 5 days ago. Patient states he is frustrated at the dialysis company because they wanted to send him to another town for dialysis due to his wife recently by being positive for COVID-19. Dialysis company wanted to treat patient as if he was \"with positive and have him go to the same dialysis facility as other COVID positive patients. Patient states he feels like he has gained weight and is also noticing swelling in lower extremities as well as shortness of breath. Patient denies fever, loss of taste or smell, nausea, vomiting, diarrhea.     While in the emergency room department, chest x-ray demonstrated pulmonary vascular congestion as well as recent history of his wife being: Positive he was tested and indeed was positive.     Patient states on , he felt very ill however did not have a fever. Patient states he just felt overall fatigued and lethargic. Patient admits to cough with sputum production that is even clear or dark brown blood-tinged.     Past Medical History: End-stage renal disease, depression, diabetes mellitus, cocaine abuse, anemia associated with chronic renal failure, arthritis, hyperlipidemia, hypertension, hyperphosphatemia, PTSD, secondary hyperparathyroidism, sleep apnea, AAA  Family History: Mother:  cancer. Brother:  aneurysm. Father:  murdered  Social History: Current smoker, denies alcohol, history of cocaine use  with 2 children,      ROS   Patient admits to still having productive cough, however is unable to bring sputum up. Patient admits to mild dyspnea. Patient denies fever. On room air     All other systems reviewed and negative  Scheduled Meds:   atorvastatin  40 mg Oral Nightly    sertraline  100 mg Oral Daily    Vitamin D  1,000 Units Oral BID    ferrous sulfate  325 mg Oral TID WC    phosphorus replacement protocol   Other RX Placeholder    calcium replacement protocol   Other RX Placeholder    remdesivir IVPB  100 mg Intravenous Q24H    sodium chloride  30 mL Intravenous Daily    aspirin  81 mg Oral Daily    famotidine  20 mg Oral Every Other Day    sodium chloride flush  10 mL Intravenous 2 times per day    cloNIDine  0.3 mg Oral TID    isosorbide mononitrate  120 mg Oral Daily    verapamil  240 mg Oral Daily    doxazosin  8 mg Oral 2 times per day     Continuous Infusions:    PHYSICAL EXAMINATION:  T: 98.3. P: 78. RR: 15. B/P: 143/75.  O2 Sat: 92.  I/O: 120/0  Body mass index is 29.78 kg/m². GCS:   15  General:   Chronically ill-appearing male, no respiratory distress noted, patient receiving dialysis when I walked  HEENT:  normocephalic and atraumatic. No scleral icterus. PERR  Neck: supple. No Thyromegaly. Lungs: Bilateral rales noted. No heaves or lifts. No retractions  Cardiac: RRR. No JVD. Abdomen: soft. Nontender. Positive bowel sounds  Extremities:  No clubbing or cyanosis. +1 lower extremity edema bilaterally. Left arm fistula  Vasculature: capillary refill < 3 seconds. Palpable dorsalis pedis pulses. Skin:  warm and dry. Psych:  Alert and oriented x3. Affect appropriate  Lymph:  No supraclavicular adenopathy. Neurologic:  No focal deficit. No seizures. Data: (All radiographs, tracings, PFTs, and imaging are personally viewed and interpreted unless otherwise noted).  Sodium 134, potassium 4.5, chloride 96, CO2 27, BUN 35, creatinine 7.3, calcium 8.1, total protein 6.6   Albumin 3.2, alk phos 61, ALT 6, AST 10, bilirubin 0.3   WBC 2.8, hemoglobin 9.2, hematocrit 29.3, platelet 86   INR 3.00   Telemetry shows normal sinus rhythm  · Chest x-ray 9/15/20 reports: 1. Worsening hazy appearance in the mid and lower lung zones suggestive of pulmonary edema. There is also right upper lobe more focal opacity. This could also represent pulmonary edema. Pneumonia is not excluded. 2.  Bilateral pleural effusions. Right-sided pleural effusion is slightly increased in size  · EKG 9/15/20 reports: Normal sinus rhythm. Possible Left atrial enlargement. Left axis deviation. LVH. T wave abnormality, consider lateral ischemia. Prolonged QT interval or tu fusion, consider myocardial disease, electrolyte imbalance, or drug effects. Abnormal ECG.  When compared with ECG of 02-SEP-2020 03:27, No significant change was found       Meets Continued ICU Level Care Criteria:    [x] Yes   [] No - Transfer Planned to listed location:  [] HOSPITALIST

## 2020-09-17 NOTE — FLOWSHEET NOTE
Pt's family was contacted via phone for prayer and encouragement      09/17/20 8677   Encounter Summary   Services provided to: Patient   Referral/Consult From: Rounding   Support System Spouse   Continue Visiting Yes  (9/17 F)   Complexity of Encounter Low   Length of Encounter 15 minutes   Routine   Type Initial   Assessment Approachable;Calm   Intervention Prayer;[place]   Outcome Acceptance;Expressed gratitude;Encouraged; Hopeful;Receptive

## 2020-09-18 VITALS
RESPIRATION RATE: 19 BRPM | BODY MASS INDEX: 29.11 KG/M2 | SYSTOLIC BLOOD PRESSURE: 161 MMHG | HEIGHT: 72 IN | OXYGEN SATURATION: 94 % | TEMPERATURE: 98.3 F | DIASTOLIC BLOOD PRESSURE: 85 MMHG | HEART RATE: 80 BPM | WEIGHT: 214.95 LBS

## 2020-09-18 LAB
ALBUMIN SERPL-MCNC: 3.2 G/DL (ref 3.5–5.1)
ALP BLD-CCNC: 64 U/L (ref 38–126)
ALT SERPL-CCNC: 6 U/L (ref 11–66)
ANION GAP SERPL CALCULATED.3IONS-SCNC: 11 MEQ/L (ref 8–16)
AST SERPL-CCNC: 9 U/L (ref 5–40)
BASOPHILS # BLD: 0.3 %
BASOPHILS ABSOLUTE: 0 THOU/MM3 (ref 0–0.1)
BILIRUB SERPL-MCNC: 0.3 MG/DL (ref 0.3–1.2)
BUN BLDV-MCNC: 27 MG/DL (ref 7–22)
CALCIUM SERPL-MCNC: 8.4 MG/DL (ref 8.5–10.5)
CHLORIDE BLD-SCNC: 99 MEQ/L (ref 98–111)
CO2: 26 MEQ/L (ref 23–33)
CREAT SERPL-MCNC: 5.8 MG/DL (ref 0.4–1.2)
EKG ATRIAL RATE: 76 BPM
EKG P AXIS: 44 DEGREES
EKG P-R INTERVAL: 198 MS
EKG Q-T INTERVAL: 452 MS
EKG QRS DURATION: 108 MS
EKG QTC CALCULATION (BAZETT): 508 MS
EKG R AXIS: -33 DEGREES
EKG T AXIS: 137 DEGREES
EKG VENTRICULAR RATE: 76 BPM
EOSINOPHIL # BLD: 2.9 %
EOSINOPHILS ABSOLUTE: 0.1 THOU/MM3 (ref 0–0.4)
ERYTHROCYTE [DISTWIDTH] IN BLOOD BY AUTOMATED COUNT: 12.6 % (ref 11.5–14.5)
ERYTHROCYTE [DISTWIDTH] IN BLOOD BY AUTOMATED COUNT: 46 FL (ref 35–45)
GFR SERPL CREATININE-BSD FRML MDRD: 12 ML/MIN/1.73M2
GLUCOSE BLD-MCNC: 84 MG/DL (ref 70–108)
HCT VFR BLD CALC: 32.7 % (ref 42–52)
HEMOGLOBIN: 10 GM/DL (ref 14–18)
IMMATURE GRANS (ABS): 0.01 THOU/MM3 (ref 0–0.07)
IMMATURE GRANULOCYTES: 0.3 %
LYMPHOCYTES # BLD: 19.1 %
LYMPHOCYTES ABSOLUTE: 0.6 THOU/MM3 (ref 1–4.8)
MCH RBC QN AUTO: 30.4 PG (ref 26–33)
MCHC RBC AUTO-ENTMCNC: 30.6 GM/DL (ref 32.2–35.5)
MCV RBC AUTO: 99.4 FL (ref 80–94)
MONOCYTES # BLD: 12.6 %
MONOCYTES ABSOLUTE: 0.4 THOU/MM3 (ref 0.4–1.3)
NUCLEATED RED BLOOD CELLS: 0 /100 WBC
PLATELET # BLD: 103 THOU/MM3 (ref 130–400)
PMV BLD AUTO: 10.5 FL (ref 9.4–12.4)
POTASSIUM SERPL-SCNC: 4.5 MEQ/L (ref 3.5–5.2)
RBC # BLD: 3.29 MILL/MM3 (ref 4.7–6.1)
SEG NEUTROPHILS: 64.8 %
SEGMENTED NEUTROPHILS ABSOLUTE COUNT: 2 THOU/MM3 (ref 1.8–7.7)
SODIUM BLD-SCNC: 136 MEQ/L (ref 135–145)
TOTAL PROTEIN: 6.4 G/DL (ref 6.1–8)
WBC # BLD: 3.1 THOU/MM3 (ref 4.8–10.8)

## 2020-09-18 PROCEDURE — 85025 COMPLETE CBC W/AUTO DIFF WBC: CPT

## 2020-09-18 PROCEDURE — 99232 SBSQ HOSP IP/OBS MODERATE 35: CPT | Performed by: INTERNAL MEDICINE

## 2020-09-18 PROCEDURE — 36415 COLL VENOUS BLD VENIPUNCTURE: CPT

## 2020-09-18 PROCEDURE — 6370000000 HC RX 637 (ALT 250 FOR IP): Performed by: STUDENT IN AN ORGANIZED HEALTH CARE EDUCATION/TRAINING PROGRAM

## 2020-09-18 PROCEDURE — 6370000000 HC RX 637 (ALT 250 FOR IP): Performed by: INTERNAL MEDICINE

## 2020-09-18 PROCEDURE — 93010 ELECTROCARDIOGRAM REPORT: CPT | Performed by: NUCLEAR MEDICINE

## 2020-09-18 PROCEDURE — 80053 COMPREHEN METABOLIC PANEL: CPT

## 2020-09-18 RX ADMIN — Medication 1000 UNITS: at 09:10

## 2020-09-18 RX ADMIN — FAMOTIDINE 20 MG: 20 TABLET, FILM COATED ORAL at 09:10

## 2020-09-18 RX ADMIN — ASPIRIN 81 MG: 81 TABLET ORAL at 09:10

## 2020-09-18 RX ADMIN — SERTRALINE HYDROCHLORIDE 100 MG: 100 TABLET, FILM COATED ORAL at 09:10

## 2020-09-18 RX ADMIN — FERROUS SULFATE TAB 325 MG (65 MG ELEMENTAL FE) 325 MG: 325 (65 FE) TAB at 09:10

## 2020-09-18 ASSESSMENT — PAIN SCALES - GENERAL: PAINLEVEL_OUTOF10: 0

## 2020-09-18 NOTE — CARE COORDINATION
9/18/20, 9:39 AM EDT    Home with wife. Denies needs, declines HH. [employer] set up to pick him up tomorrow morning at 0515 for his HD appointment at [address] [place] [address]. This CM added to AVS the reminder that transportation would pick him up at 0515 tomorrow (Saturday 9/19) for dialysis. Also added a separate line reminding patient to [facility] call the number on the back of your Medicaid card today to schedule transportation to Tuesday dialysis appointment - they need advance notice\". Patient goals/plan/ treatment preferences discussed by  and . Patient goals/plan/ treatment preferences reviewed with patient/ family. Patient/ family verbalize understanding of discharge plan and are in agreement with goal/plan/treatment preferences. Understanding was demonstrated using the teach back method. AVS provided by RN at time of discharge, which includes all necessary medical information pertaining to the patients current course of illness, treatment, post-discharge goals of care, and treatment preferences. IMM Letter  IMM Letter given to Patient/Family/Significant other/Guardian/POA/by[de-identified]   IMM Letter date given[de-identified] 09/17/20  IMM Letter time given[de-identified] [facility] and LM with Aliyah Mcgraw to notify of COVID + patient being discharged home today.

## 2020-09-18 NOTE — PROGRESS NOTES
AVS reviewed with pt. He did not want to wait for his BP meds to arrive, he states that he will take them when he gets home. He arranged his own transport home. Did stress the need to call today to set up transport for dialysis on Tuesday and reminded him of his appointment for Saturday.

## 2020-09-18 NOTE — PROGRESS NOTES
Renal Progress Note    Assessment and Plan:    1. End stage kidney disease on hemodialysis. 2.  Pulmonary edema resolved  3. Volume overload resolved  4. SARS-CoV-2 positive status  5. Hypertension primary improved and stable  6. Pancytopenia     PLAN:  I discussed my thoughts with the patient. He understood. Addressed his questions.   Labs reviewed  Serum sodium is improved  Medications reviewed  No changes  Hemodialysis tomorrow if not discharged today  Okay to discharge otherwise from renal perspective  Discussed with Patient and staff  We will follow        Patient Active Problem List:     FSGS (focal segmental glomerulosclerosis)     Diabetes mellitus type 2, controlled ([address]     Monoclonal (M) protein disease, multiple 'M' protein     Depression     PTSD (post-traumatic stress disorder)     Secondary renal hyperparathyroidism ([address]     Cocaine use     Substance induced mood disorder ([address]     Renal artery stenosis (HCC) with uncontrollable hypertension     Chronic alcoholism (HCC)     Severe cocaine use disorder ([address]     Essential hypertension     Uncontrolled hypertension     LVH (left ventricular hypertrophy) due to hypertensive disease     Acute on chronic diastolic CHF (congestive heart failure) ([facility])     REZA (obstructive sleep apnea)     Headache, unspecified headache type     FH: HTN (hypertension)     Hypertrophic cardiomyopathy ([address]     Diet-controlled diabetes mellitus ([address]     A-V fistula ([address]     Chronic thoracic aortic dissection (HCC)     Hyperphosphatemia     Anemia in CKD (chronic kidney disease)     Vitamin D deficiency     Thrombocytopenia (HCC)     Personality disorder (HCC)     Tobacco abuse     Metabolic acidosis     Pneumonia due to organism     Precordial pain     ESRD on hemodialysis (HCC)     Edema of upper extremity     ESRD (end stage renal disease) ([address]     Hyperglycemia     Cocaine abuse, continuous - reports spending $100 on cocaine \"every other day\"     Homelessness Moderate episode of recurrent major depressive disorder ([facility]     Noncompliance of patient with renal dialysis ([facility]     Anemia associated with stage 5 chronic renal failure ([facility]     Hypertensive emergency     Cocaine use disorder, severe, dependence ([facility]     Alcohol use disorder, severe, dependence ([facility]     Abdominal aortic aneurysm (AAA) without rupture (HCC)     Atrial flutter with rapid ventricular response (HCC)     PVD (peripheral vascular disease) (HCC)     Dyslipidemia     Other fluid overload     Laceration of flexor muscle, fascia and tendon of left thumb at forearm level, initial encounter     Acute respiratory failure with hypoxia (HCC)     Acute pulmonary edema (HCC)     Macrocytic anemia     History of aortic aneurysm repair     Medical non-compliance     Volume overload     Chest pain     Dyspnea     Pancytopenia (HCC)     Acute febrile illness     Hyponatremia     Hypermagnesemia     History of atrial flutter     History of alcohol abuse     History of cocaine abuse (HCC)     Mild tricuspid regurgitation     Uremia, acute     COVID-19 virus detected      Subjective:   Admit Date: 9/15/2020    Interval History:   Seen and examined. Seen for end-stage kidney disease and volume overload. Awake and alert. Doing well  Updated by the staff. No new issues. Blood pressure is okay      Medications:   Scheduled Meds:    Continuous Infusions:    CBC:   Recent Labs     09/16/20  0607 09/17/20  0450 09/18/20  0547   WBC 3.1* 2.8* 3.1*   HGB 9.0* 9.2* 10.0*   PLT 79* 86* 103*     CMP:    Recent Labs     09/16/20  0607 09/17/20  0450 09/18/20  0547    134* 136   K 4.9 4.5 4.5    96* 99   CO2 23 27 26   BUN 48* 35* 27*   CREATININE 9.0* 7.3* 5.8*   GLUCOSE 92 87 84   CALCIUM 8.2* 8.1* 8.4*   LABGLOM 7* 10* 12*     Troponin: No results for input(s): TROPONINI in the last 72 hours. BNP: No results for input(s): BNP in the last 72 hours.   INR:   Recent Labs     09/16/20  2303   INR 1.03 Lipids: No results for input(s): CHOL, LDLDIRECT, TRIG, HDL, AMYLASE, LIPASE in the last 72 hours. Liver:   Recent Labs     09/18/20  0547   AST 9   ALT 6*   ALKPHOS 64   PROT 6.4   LABALBU 3.2*   BILITOT 0.3     Iron:  No results for input(s): IRONS, FERRITIN in the last 72 hours. Invalid input(s): LABIRONS  XR CHEST PORTABLE   Final Result   1. Worsening hazy appearance in the mid and lower lung zones suggesting pulmonary edema. There is also right upper lobe more focal opacity. This can also represent pulmonary edema. Pneumonia is not excluded. 2. Bilateral pleural effusions. These are small. The right-sided pleural effusion has slightly increased in size. **This report has been created using voice recognition software. It may contain minor errors which are inherent in voice recognition technology. **      Final report electronically signed by Dr. Cassandra Lentz on 9/15/2020 9:38 AM            Objective:   Vitals: BP (!) 161/85   Pulse 80   Temp 98.3 °F (36.8 °C) (Oral)   Resp 19   Ht 6' (1.829 m)   Wt 214 lb 15.2 oz (97.5 kg)   SpO2 94%   BMI 29.15 kg/m²    Wt Readings from Last 3 Encounters:   09/17/20 214 lb 15.2 oz (97.5 kg)   09/02/20 215 lb (97.5 kg)   07/30/20 214 lb 8.1 oz (97.3 kg)      24HR INTAKE/OUTPUT:      Intake/Output Summary (Last 24 hours) at 9/18/2020 1322  Last data filed at 9/17/2020 1540  Gross per 24 hour   Intake --   Output 0 ml   Net 0 ml       Constitutional:  Alert, awake, no apparent distress   Skin:normal with no rash or lesions. HEENT:Pupils are reactive . Throat is clear . Oral mucosa is moist   Neck:supple with no carotid bruit or JVD. Cardiovascular:  S1, S2 without murmur or rubs   Respiratory: Bilateral crackles  Abdomen: +bs, soft, no tenderness to palpation and no palpable mass. No abdominal bruit   Ext: Trace to 1+ bilateral LE edema. Left brachiocephalic fistula is noted.   It has good bruit and good thrill. Musculoskeletal:Intact  Neuro:Alert and awake. Well oriented  with no focal deficit. Speech is normal      Electronically signed by Jeanie Peralta MD on 9/18/2020 at 1:22 PM

## 2020-09-18 NOTE — DISCHARGE SUMMARY
CRITICAL CARE DISCHARGE NOTE      Patient:  Ziggy Gardiner    Unit/Bed:4B-05/005-A  YOB: 1965  MRN: [id number]   PCP: Leelee Abdalla MD  Date of Admission: 9/15/2020  Chief Complaint:-COVID-19 infection    Assessment and Plan:    1. COVID-19: Patient is considered mild to moderate in severity of COVID-19 infection, he is not currently requiring any oxygen supplementation. Patient received 2 doses of remdesivir treatment. Patient received convalescent plasma 3/23/94, without complication or noted side effect.  Continue aspirin 81 mg daily to treat/prevent microthrombosis. Recommend that an [facility] for dialysis to be used in light of current COVID infection.   2. Acute on chronic diastolic congestive heart failure: Exacerbation due to fluid overload.  Patient is noncompliant and has missed multiple appointments with dialysis. José Luis Knowles has chronically elevated troponins, due to end-stage renal disease.  Pulmonary edema was noted on chest x-ray 9/15/20. Patient received dialysis 9/15/20, 9/16/20, and 9/17/20.  Breath sounds on auscultation are significantly improved  3. End-stage renal disease: Patient currently on hemodialysis.  Typical schedule is Tuesday, Thursday, Saturday, however patient has missed multiple appointments with dialysis secondary to new travel requirement given patient's wife was positive for COVID. Patient received dialysis 9/15/20, 9/16/20, and 9/17/20. Continue patient on renal diet.  Continue vitamin D 1000 units BID. Patient has transportation in line to receive dialysis at new facility. 4. Secondary hyperparathyroidism: Due to end-stage renal disease, nephrology following  5. Chronic macrocytic anemia: Due to end-stage renal disease.  Continue ferrous sulfate 325 mg 3 times daily  6. History of hypertension: Continue with clonidine 0.3mg 3 times daily, Imdur 120 mg daily, verapamil 240 mg daily, Cardura 8 mg twice daily  7.  History of atrial flutter: s/p ALEISHA 3/2018 normal sinus rhythm-no OAC due to history of aneurysm. Mild tricuspid regurgitation   8. History of thoracic and abdominal aortic aneurysm with dissection:  S/p thoracoabdominal endograft repair in [facility] 7/2018. TEVAR of a [place] type B chronic dissection with left carotid subclavian bypass 7/2018.  Last CTA of chest 2/2020 demonstrated native thoracic aortic aneurysm slightly larger than previous CT.  Its lumen likely containing previous hemorrhage. Sadie Blood is an endograft without gross evidence of endoleak  9. History of hyperlipidemia: Continue Lipitor 40 mg nightly  10. History of GERD: Continue Pepcid 20 mg every other day  11. History of type 2 diabetes: Patient's glucose has been within normal limits.  We will continue to monitor with Accu-Cheks TID and initiate therapy when appropriate    12. History of depression: Continue with Zoloft 100 mg daily,  trazodone 50 mg nightly for sleep  13. History of sleep apnea: Not on any Pap therapy at this time  14.  History of cocaine and alcohol abuse: No urine drug screen/EtOH levels drawn on admission    INITIAL H AND P AND ICU COURSE:  68-year-old male with a significant past medical history of end-stage renal disease on hemodialysis, hypertension, anemia of chronic disease, secondary hyperparathyroidism, hyperphosphatemia, diabetes mellitus, cocaine abuse, aortic aneurysm, and depression presented to [facility] emergency room for evaluation of fluid overload.  Patient states he is a dialysis patient and receives dialysis on Tuesday, Thursday, Saturday.  Patient states his last dialysis treatment was 5 days ago. Dalia Chavez states he is frustrated at [employer] they wanted to send him to another town for dialysis due to his wife recently by being positive for [address] wanted to treat patient as if he was \"with positive and have him go to the same dialysis facility as other COVID positive patients. Dalia Chavez states he feels like he has gained weight and is also noticing swelling in lower extremities as well as shortness of breath.  Patient denies fever, loss of taste or smell, nausea, vomiting, diarrhea.     While in the emergency room department, chest x-ray demonstrated pulmonary vascular congestion as well as recent history of his wife being: Positive he was tested and indeed was positive.     Patient states on , he felt very ill however did not have a fever.  Patient states he just felt overall fatigued and lethargic.  Patient admits to cough with sputum production that is even clear or dark brown blood-tinged.     Past Medical History: End-stage renal disease, depression, diabetes mellitus, cocaine abuse, anemia associated with chronic renal failure, arthritis, hyperlipidemia, hypertension, hyperphosphatemia, PTSD, secondary hyperparathyroidism, sleep apnea, AAA  Family History: Mother:  cancer.  Brother:  aneurysm.  Father:  murdered  Social History: Current smoker, denies alcohol, history of cocaine use  with 2 children,      ROS   Patient states today is the best he has felt a week. Admits to minimal cough and dyspnea. Denies fever. On room air without shortness of breath. All other systems reviewed negative    PHYSICAL EXAMINATION:  T: 98.3. P: 77. RR: 20. B/P: 155/8796. O2 Sat:  95  I/O: 400/0  Body mass index is 29.15 kg/m². GCS:   15  General:   Chronically ill-appearing male, no respiratory or acute distress, sitting up in chair  HEENT:  normocephalic and atraumatic. No scleral icterus. PERR  Neck: supple. No Thyromegaly. Lungs: Mild bilateral rales. Symmetric air entry. No retractions  Cardiac: RRR. No JVD. Abdomen: soft. Nontender. Positive bowel sounds  Extremities:  No clubbing, cyanosis, or edema x 4. Left arm fistula without signs of infection  Vasculature: capillary refill < 3 seconds. Palpable dorsalis pedis pulses.   Skin: warm and dry. Psych:  Alert and oriented x3. Affect appropriate  Lymph:  No supraclavicular adenopathy. Neurologic:  No focal deficit. No seizures. Data: (All radiographs, tracings, PFTs, and imaging are personally viewed and interpreted unless otherwise noted).  Sodium 136, potassium 4.5, chloride 99, CO2 26, BUN 27, creatinine 5.8, calcium 8.4   Albumin 3.2, alk phos 64, ALT 6, AST 9, bilirubin 0.3, total protein 6.4   WBC 3.1, hemoglobin 10.0, hematocrit 32.7, platelets 744   Telemetry shows normal sinus rhythm   EKG twelve-lead 9/18/20: Normal sinus rhythm. Possible Left atrial enlargement. Left axis deviation. LVH with repolarization abnormality. Prolonged QT interval or tu fusion, consider myocardial disease, electrolyte imbalance, or drug effects. Abnormal ECG   When compared with ECG of 15-SEP-2020 09:20, No significant change was found    Chest x-ray 9/15/20 reports: 1.  Worsening hazy appearance in the mid and lower lung zones suggestive of pulmonary edema.  There is also right upper lobe more focal opacity.  This could also represent pulmonary edema.  Pneumonia is not excluded. 2.  Bilateral pleural effusions.  Right-sided pleural effusion is slightly increased in size   EKG 9/15/20 reports: Normal sinus rhythm. Possible Left atrial enlargement. Left axis deviation. LVH. T wave abnormality, consider lateral ischemia. Prolonged QT interval or tu fusion, consider myocardial disease, electrolyte imbalance, or drug effects. Abnormal ECG. When compared with ECG of 02-SEP-2020 03:27, No significant change was found       Discharge disposition: Home in stable and improved condition      Case and plan discussed with Dr. Lenka Acosta. Electronically signed by Sriram Cha DO  CRITICAL CARE SPECIALIST  Patient seen by me. Patient discharging home. Patient status post convalescent plasma. Electronically signed by Bhumika Acosta MD.

## 2021-07-26 ENCOUNTER — APPOINTMENT (OUTPATIENT)
Dept: GENERAL RADIOLOGY | Age: 56
End: 2021-07-26
Payer: MEDICARE

## 2021-07-26 ENCOUNTER — HOSPITAL ENCOUNTER (EMERGENCY)
Age: 56
Discharge: HOME | End: 2021-07-26
Attending: EMERGENCY MEDICINE
Payer: MEDICARE

## 2021-07-26 VITALS
HEART RATE: 93 BPM | WEIGHT: 240 LBS | OXYGEN SATURATION: 95 % | BODY MASS INDEX: 32.51 KG/M2 | HEIGHT: 72 IN | SYSTOLIC BLOOD PRESSURE: 139 MMHG | TEMPERATURE: 98.8 F | DIASTOLIC BLOOD PRESSURE: 83 MMHG | RESPIRATION RATE: 15 BRPM

## 2021-07-26 DIAGNOSIS — N18.6 STAGE 5 CHRONIC KIDNEY DISEASE ON CHRONIC DIALYSIS (HCC): ICD-10-CM

## 2021-07-26 DIAGNOSIS — Z99.2 STAGE 5 CHRONIC KIDNEY DISEASE ON CHRONIC DIALYSIS (HCC): ICD-10-CM

## 2021-07-26 DIAGNOSIS — J40 BRONCHITIS: Primary | ICD-10-CM

## 2021-07-26 LAB
ANION GAP SERPL CALCULATED.3IONS-SCNC: 16 MEQ/L (ref 8–16)
BASOPHILS # BLD: 0.4 %
BASOPHILS ABSOLUTE: 0 THOU/MM3 (ref 0–0.1)
BUN BLDV-MCNC: 63 MG/DL (ref 7–22)
CALCIUM SERPL-MCNC: 8.6 MG/DL (ref 8.5–10.5)
CHLORIDE BLD-SCNC: 101 MEQ/L (ref 98–111)
CO2: 23 MEQ/L (ref 23–33)
CREAT SERPL-MCNC: 10.8 MG/DL (ref 0.4–1.2)
EOSINOPHIL # BLD: 2.7 %
EOSINOPHILS ABSOLUTE: 0.1 THOU/MM3 (ref 0–0.4)
ERYTHROCYTE [DISTWIDTH] IN BLOOD BY AUTOMATED COUNT: 13.9 % (ref 11.5–14.5)
ERYTHROCYTE [DISTWIDTH] IN BLOOD BY AUTOMATED COUNT: 52.7 FL (ref 35–45)
FLU A ANTIGEN: NEGATIVE
FLU B ANTIGEN: NEGATIVE
GFR SERPL CREATININE-BSD FRML MDRD: 6 ML/MIN/1.73M2
GLUCOSE BLD-MCNC: 98 MG/DL (ref 70–108)
HCT VFR BLD CALC: 38.3 % (ref 42–52)
HEMOGLOBIN: 11.5 GM/DL (ref 14–18)
IMMATURE GRANS (ABS): 0.02 THOU/MM3 (ref 0–0.07)
IMMATURE GRANULOCYTES: 0.4 %
LYMPHOCYTES # BLD: 8.6 %
LYMPHOCYTES ABSOLUTE: 0.4 THOU/MM3 (ref 1–4.8)
MCH RBC QN AUTO: 30.6 PG (ref 26–33)
MCHC RBC AUTO-ENTMCNC: 30 GM/DL (ref 32.2–35.5)
MCV RBC AUTO: 101.9 FL (ref 80–94)
MONOCYTES # BLD: 12.6 %
MONOCYTES ABSOLUTE: 0.6 THOU/MM3 (ref 0.4–1.3)
NUCLEATED RED BLOOD CELLS: 0 /100 WBC
OSMOLALITY CALCULATION: 297.3 MOSMOL/KG (ref 275–300)
PLATELET # BLD: 122 THOU/MM3 (ref 130–400)
PMV BLD AUTO: 9.8 FL (ref 9.4–12.4)
POTASSIUM REFLEX MAGNESIUM: 5.4 MEQ/L (ref 3.5–5.2)
RBC # BLD: 3.76 MILL/MM3 (ref 4.7–6.1)
SARS-COV-2, NAAT: NOT DETECTED
SEG NEUTROPHILS: 75.3 %
SEGMENTED NEUTROPHILS ABSOLUTE COUNT: 3.8 THOU/MM3 (ref 1.8–7.7)
SODIUM BLD-SCNC: 140 MEQ/L (ref 135–145)
WBC # BLD: 5.1 THOU/MM3 (ref 4.8–10.8)

## 2021-07-26 PROCEDURE — 36415 COLL VENOUS BLD VENIPUNCTURE: CPT

## 2021-07-26 PROCEDURE — 87635 SARS-COV-2 COVID-19 AMP PRB: CPT

## 2021-07-26 PROCEDURE — 71046 X-RAY EXAM CHEST 2 VIEWS: CPT

## 2021-07-26 PROCEDURE — 85025 COMPLETE CBC W/AUTO DIFF WBC: CPT

## 2021-07-26 PROCEDURE — 99284 EMERGENCY DEPT VISIT MOD MDM: CPT

## 2021-07-26 PROCEDURE — 80048 BASIC METABOLIC PNL TOTAL CA: CPT

## 2021-07-26 PROCEDURE — 87804 INFLUENZA ASSAY W/OPTIC: CPT

## 2021-07-26 RX ORDER — DOXYCYCLINE HYCLATE 100 MG
100 TABLET ORAL 2 TIMES DAILY
Qty: 20 TABLET | Refills: 0 | Status: SHIPPED | OUTPATIENT
Start: 2021-07-26 | End: 2021-08-05

## 2021-07-26 RX ORDER — BENZONATATE 100 MG/1
100 CAPSULE ORAL 3 TIMES DAILY PRN
Qty: 20 CAPSULE | Refills: 0 | Status: SHIPPED | OUTPATIENT
Start: 2021-07-26 | End: 2021-08-02

## 2021-07-26 ASSESSMENT — ENCOUNTER SYMPTOMS
BACK PAIN: 0
SORE THROAT: 0
COLOR CHANGE: 0
ABDOMINAL PAIN: 0
CHEST TIGHTNESS: 0
SINUS PRESSURE: 0
EYE REDNESS: 0
NAUSEA: 0
COUGH: 1
VOMITING: 0
RHINORRHEA: 1
PHOTOPHOBIA: 0

## 2021-07-26 NOTE — ED TRIAGE NOTES
Pt presents to the ED from home with complaints of cold symptoms. Pt states he has had a productive cough for the past three days. Pt states he was taking Robitussin that did not help relieve the cough. Pt states he woke up this morning with nasal congestion in the left nostril. Pt is alert and oriented. Pt respirations are even and unlabored.

## 2021-07-26 NOTE — ED PROVIDER NOTES
[address] [address]          Pt Name: Avelina Wren  MRN: [id number]  [place] 1965  Date of evaluation: 7/26/2021  Emergency Physician: Naomi Jean, [facility]       Chief Complaint   Patient presents with    Cough    Nasal Congestion     History obtained from the patient. HISTORY OF PRESENT ILLNESS    HPI  Avelina Wren is a 49 y.o. male who presents to the emergency department for evaluation of cough. Patient with nasal congestion and productive cough over the last 4 days. He relates that related to recent temperature changes. No sick contacts. Reports cough is productive of greenish-yellow sputum. No associated fever or chills. No nausea no vomiting. No shortness of breath. No difficulty in breathing. Of note patient is a dialysis patient he follows with Dr. Debbie Tripathi weekly. His last dialysis was on Saturday. He reports dialysis Tuesday Thursday and Saturday. And when she is compliant. The patient has no other acute complaints at this time. REVIEW OF SYSTEMS   Review of Systems   Constitutional: Negative for activity change, chills and fever. HENT: Positive for congestion and rhinorrhea. Negative for sinus pressure and sore throat. Eyes: Negative for photophobia, redness and visual disturbance. Respiratory: Positive for cough. Negative for chest tightness. Cardiovascular: Negative for chest pain, palpitations and leg swelling. Gastrointestinal: Negative for abdominal pain, nausea and vomiting. Endocrine: Negative for polydipsia and polyuria. Genitourinary: Negative for decreased urine volume, difficulty urinating, dysuria, flank pain, frequency and urgency. Musculoskeletal: Negative for back pain, myalgias and neck pain. Skin: Negative for color change and rash. Neurological: Negative for weakness and headaches. Hematological: Negative for adenopathy. Does not bruise/bleed easily. Psychiatric/Behavioral: Negative for confusion and self-injury. PAST MEDICAL AND SURGICAL HISTORY     Past Medical History:   Diagnosis Date    AAA [facility]ab[facility]) [facility]     [facility]URIS [facility]) [facility] 9/24/2015    Anemia associated with chronic renal failure     Arthritis     stated in hands    Cocaine abuse ([facility] 5/10/2014    Depression     Diabetes mellitus ([facility]     pt states he no longer has diabetes he has lost alot of davion.  FSGS (focal segmental glomerulosclerosis) 5/23/2013    Hemodialysis patient ([facility] 10/17/2016    on hemod[facility] 1/16/2012    History of blood transfusion     Hyperlipidemia     Hyperphosphatemia 5/21/2016    Hypertension     Left renal artery stenosis ([facility] 5/22/2014    Monoclonal (M) protein disease, multiple 'M' protein     Nicotine dependence 6/16/2014    Noncompliance     Pneumonia     Psychiatric problem     PTSD (post-traumatic stress disorder)     Pt vet from [place]    Secondary hyperparathyroidism (of renal origin)     Sleep apnea      Past Surgical History:   Procedure Laterality Date    ABDOMINAL AORTIC ANEURYSM REPAIR      DIALYSIS FISTULA CREATION Left 07/08/2016    EKG 12-LEAD  9/24/2015         HAND TENDON SURGERY Left 4/5/2019    LEFT THUMB FLEXOR TENDON REPAIR performed by Aakash Hutchins MD at [address] [place] [address] VASCULAR SURGERY Left 07/08/2016    AV Fistula    VASCULAR SURGERY Right 1990    Surgery on Achilles tendon         MEDICATIONS   No current facility-administered medications for this encounter.     Current Outpatient Medications:     doxycycline hyclate (VIBRA-TABS) 100 MG tablet, Take 1 tablet by mouth 2 times daily for 10 days, Disp: 20 tablet, Rfl: 0    benzonatate (TESSALON) 100 MG capsule, Take 1 capsule by mouth 3 times daily as needed for Cough, Disp: 20 capsule, Rfl: 0    Sucroferric Oxyhydroxide (VELPHORO) 500 MG CHEW, Take 500 mg by mouth 4 times daily (before meals and nightly) Take with meals and snacks, Disp: , Rfl:     Multiple Vitamins-Minerals (THERAPEUTIC MULTIVITAMIN-MINERALS) tablet, Take 1 tablet by mouth daily, Disp: , Rfl:     folic acid (FOLVITE) 653 MCG tablet, Take 800 mcg by mouth daily, Disp: , Rfl:     vitamin E 400 UNIT capsule, Take 400 Units by mouth daily, Disp: , Rfl:     minoxidil (LONITEN) 10 MG tablet, Take 2 tablets by mouth every 12 hours for 28 days (Patient taking differently: Take 10 mg by mouth 2 times daily Take 15mg every 12 hours), Disp: 21 tablet, Rfl: 1    ferrous sulfate (FE TABS 325) 325 (65 Fe) MG EC tablet, Take 1 tablet by mouth 3 times daily (with meals) (Patient taking differently: Take 325 mg by mouth daily (with breakfast) ), Disp: 270 tablet, Rfl: 3    nitroGLYCERIN (NITROSTAT) 0.4 MG SL tablet, up to max of 3 total doses.  If no relief after 1 dose, call 911., Disp: 25 tablet, Rfl: 3    acetaminophen (TYLENOL) 325 MG tablet, Take 650 mg by mouth every 4 hours as needed for Pain or Fever, Disp: , Rfl:     B Complex-C-Zn-Folic Acid (DIALYVITE 931-TIPL 15 PO), Take 1 tablet by mouth daily, Disp: , Rfl:     verapamil (CALAN SR) 240 MG extended release tablet, Take 240 mg by mouth daily, Disp: , Rfl:     vitamin D (CHOLECALCIFEROL) 25 MCG (1000 UT) TABS tablet, Take 1,000 Units by mouth 2 times daily Take two tablets twice a day, Disp: , Rfl:     cloNIDine (CATAPRES) 0.3 MG tablet, Take 1 tablet by mouth 3 times daily, Disp: 30 tablet, Rfl: 3    doxazosin (CARDURA) 8 MG tablet, Take 1 tablet by mouth every 12 hours for 28 days (Patient taking differently: Take 8 mg by mouth nightly ), Disp: 14 tablet, Rfl: 3    famotidine (PEPCID) 20 MG tablet, Take 1 tablet by mouth daily, Disp: 60 tablet, Rfl: 0    sertraline (ZOLOFT) 100 MG tablet, Take 100 mg by mouth daily, Disp: , Rfl:     hydrOXYzine (ATARAX) 50 MG tablet, Take 50 mg by mouth 3 times daily as needed for Itching, Disp: , Rfl:     aspirin 81 MG EC tablet, Take 1 tablet by mouth daily, Disp: 7 tablet, Rfl: 0    isosorbide mononitrate (IMDUR) 120 MG extended release tablet, Take 1 tablet by mouth daily, Disp: 7 tablet, Rfl: 0    traZODone (DESYREL) 50 MG tablet, Take 1 tablet by mouth nightly as needed for Sleep, Disp: 7 tablet, Rfl: 0    atorvastatin (LIPITOR) 40 MG tablet, Take 1 tablet by mouth nightly, Disp: 7 tablet, Rfl: 0    fluticasone (FLONASE) 50 MCG/ACT nasal spray, 1 spray by Nasal route daily , Disp: , Rfl:     Facility-Administered Medications Ordered in Other Encounters:     HYDROmorphone (DILAUDID) injection 1 mg, 1 mg, Intravenous, Once, Connie Osborne MD    ioversol (OPTIRAY) 51 % injection 100 mL, 100 mL, Intravenous, ONCE PRN, Connie Osborne MD    lidocaine (LMX) 4 % cream, , Topical, Once, Connie Osborne MD    midazolam (VERSED) injection 1 mg, 1 mg, Intravenous, Once, Connie Osborne MD      SOCIAL HISTORY     Social History     Social History Narrative    Not on file     Social History     Tobacco Use    Smoking status: Current Some Day Smoker     Packs/day: 0.25     Years: 20.00     Pack years: 5.00     Types: Cigarettes     Start date: 10/11/1985    Smokeless tobacco: Never Used    Tobacco comment: occasional   Vaping Use    Vaping Use: Never used   Substance Use Topics    Alcohol use: No    Drug use: Not Currently     Comment: hx of         ALLERGIES     Allergies   Allergen Reactions    Humalog [Insulin Lispro] Other (See Comments)     Extreme sweating and intolerance. Patient absolutely refuses due to reaction.      Insulin Regular Human Hives    Lisinopril Swelling     Swelling in lips          FAMILY HISTORY     Family History   Problem Relation Age of Onset    Cancer Mother     Other Brother         aneurysm          PHYSICAL EXAM     ED Triage Vitals [07/26/21 0815]   BP Temp Temp Source Pulse Resp SpO2 Height Weight   (!) 213/98 98.8 °F (37.1 °C) Oral 101 18 95 % 6' (1.829 m) 240 lb (108.9 kg)         Additional Vital Signs:  Vitals:    07/26/21 0953   BP: 139/83   Pulse: 93   Resp: 15   Temp:    SpO2:        Physical Exam  Vitals and nursing note reviewed. Constitutional:       General: He is not in acute distress. Appearance: He is well-developed. He is not diaphoretic. HENT:      Head: Normocephalic. Eyes:      Pupils: Pupils are equal, round, and reactive to light. Neck:      Vascular: No JVD. Cardiovascular:      Rate and Rhythm: Normal rate and regular rhythm. Heart sounds: Normal heart sounds. Pulmonary:      Effort: Pulmonary effort is normal. No respiratory distress. Breath sounds: Normal breath sounds. No rales. Abdominal:      General: Bowel sounds are normal. There is no distension. Palpations: Abdomen is soft. Tenderness: There is no abdominal tenderness. Musculoskeletal:         General: No tenderness or deformity. Normal range of motion. Cervical back: Normal range of motion and neck supple. Skin:     General: Skin is warm and dry. Capillary Refill: Capillary refill takes less than 2 seconds. Neurological:      Mental Status: He is alert and oriented to person, place, and time. Comments: Non-focal. Moves all extremities. Initial vital signs and nursing assessment reviewed and abnormal from HTN. Pulsoximetry is normal per my interpretation. MEDICAL DECISION MAKING   Initial Assessment: Given the patient's above chief complaint and findings on history and physical examination, I thought it was appropriate to consider the following emergency medical conditions: URI, pneumonia, bronchial pneumonia, COPD, COVID-19, influenza, pulmonary edema, although some of these diagnoses are unlikely they were considered in my medical decision making.     Plan: CBC, BMP, ECG, chest x-ray symptomatic treatment with cough suppressant and antibiotics and then reassess         ED RESULTS   Laboratory results:  Labs Reviewed   CBC WITH AUTO DIFFERENTIAL - Abnormal; Notable for the following components:       Result Value    RBC 3.76 (*)     Hemoglobin 11.5 (*)     Hematocrit 38.3 (*)     .9 (*)     MCHC 30.0 (*)     RDW-SD 52.7 (*)     Platelets 835 (*)     Lymphocytes Absolute 0.4 (*)     All other components within normal limits   BASIC METABOLIC PANEL W/ REFLEX TO MG FOR LOW K - Abnormal; Notable for the following components:    Potassium reflex Magnesium 5.4 (*)     BUN 63 (*)     CREATININE 10.8 (*)     All other components within normal limits   GLOMERULAR FILTRATION RATE, ESTIMATED - Abnormal; Notable for the following components:    Est, Glom Filt Rate 6 (*)     All other components within normal limits   RAPID INFLUENZA A/B ANTIGENS   COVID-19, RAPID   ANION GAP   OSMOLALITY       Radiologic studies results:  XR CHEST (2 VW)   Final Result   1. Cardiomegaly. 2. Endoluminal graft in the thoracic aorta. 3. Right pleural effusion. 4. Increased density at the right lung base which may represent infiltrate versus scarring. 5. Thickening of the interstitial lung markings and slightly increased pulmonary vascularity. .               **This report has been created using voice recognition software. It may contain minor errors which are inherent in voice recognition technology. **      Final report electronically signed by DR Alexi Llamas on 7/26/2021 8:56 AM          ED Medications administered this visit: Medications - No data to display      ED COURSE     ED Course as of Jul 26 1105   Mon Jul 26, 2021   1059 Chest x-ray with slightly increased interstitial lung markings. Patient does not appear to be in pulmonary edema. These changes are likely expected from a dialysis patient. XR CHEST (2 VW) [DD]   1102 Within the acceptable range. Potassium(!): 5.4 [DD]   1103 Negative   SARS-CoV-2, NAAT: NOT DETECTED [DD]   1103 Negative.    Rapid influenza A/B antigens:    Flu A Antigen Negative   Flu B Antigen Negative [DD]   7009 BP: 139/83 [DD]   36 Stated took his blood pressure medicine prior to arrival.   BP(!): 213/98 [DD]   1103 Improved prior to discharge. BP: 139/83 [DD]   1104 Patient was prescribed antibiotics as he has chronic kidney disease and is at risk for more complicated disease.    [DD]      ED Course User Index  [DD] Nita Desai DO         The diagnosis, extensive differential diagnosis, laboratory and imaging findings were discussed at the bedside. The patient was an active participant in their care. They are agreeable to the plan of care. All questions and concerns were addressed at the time of the encounter. MEDICATION CHANGES     DISCHARGE MEDICATIONS:  Discharge Medication List as of 7/26/2021 10:10 AM      START taking these medications    Details   doxycycline hyclate (VIBRA-TABS) 100 MG tablet Take 1 tablet by mouth 2 times daily for 10 days, Disp-20 tablet, R-0Normal      benzonatate (TESSALON) 100 MG capsule Take 1 capsule by mouth 3 times daily as needed for Cough, Disp-20 capsule, R-0Normal                  FINAL DISPOSITION     Final diagnoses:   Bronchitis   Stage 5 chronic kidney disease on chronic dialysis ([facility]     Condition: condition: good  Dispo: Discharge to home    PATIENT REFERRED TO:  Comfort Suarez MD  [facility]  [phone]    Schedule an appointment as soon as possible for a visit in 2 days      Santy Lee MD  [facility], [address]  [address] [place] [address] Road [facility]    Go in 1 day  Tomorrow for dialysis as scheduled. Critical Care Time   None      This transcription was electronically signed. Parts of this transcriptions may have been dictated by use of voice recognition software and electronically transcribed, and parts may have been transcribed with the assistance of an ED scribe. The transcription may contain errors not detected in proofreading.     Electronically Signed: Nita Desai DO, 07/26/21, 10:45 AM      Marianne Gomez Abraham Isaac,   07/26/21 1109

## 2021-07-26 NOTE — ED NOTES
Pt resting in cot with eyes closed. Respirations are even and unlabored. Pt voices no concern or need at this time. Call light is within reach. Will continue to monitor.       Oswald Murcia RN  07/26/21 3000

## 2021-09-13 ENCOUNTER — OFFICE VISIT (OUTPATIENT)
Dept: CARDIOTHORACIC SURGERY | Age: 56
End: 2021-09-13
Payer: MEDICARE

## 2021-09-13 VITALS
BODY MASS INDEX: 31.08 KG/M2 | SYSTOLIC BLOOD PRESSURE: 186 MMHG | WEIGHT: 250 LBS | HEART RATE: 100 BPM | HEIGHT: 75 IN | DIASTOLIC BLOOD PRESSURE: 87 MMHG

## 2021-09-13 DIAGNOSIS — N18.6 STAGE 5 CHRONIC KIDNEY DISEASE ON CHRONIC DIALYSIS (HCC): Primary | ICD-10-CM

## 2021-09-13 DIAGNOSIS — Z99.2 STAGE 5 CHRONIC KIDNEY DISEASE ON CHRONIC DIALYSIS (HCC): Primary | ICD-10-CM

## 2021-09-13 PROCEDURE — G8427 DOCREV CUR MEDS BY ELIG CLIN: HCPCS | Performed by: THORACIC SURGERY (CARDIOTHORACIC VASCULAR SURGERY)

## 2021-09-13 PROCEDURE — 3017F COLORECTAL CA SCREEN DOC REV: CPT | Performed by: THORACIC SURGERY (CARDIOTHORACIC VASCULAR SURGERY)

## 2021-09-13 PROCEDURE — 4004F PT TOBACCO SCREEN RCVD TLK: CPT | Performed by: THORACIC SURGERY (CARDIOTHORACIC VASCULAR SURGERY)

## 2021-09-13 PROCEDURE — G8417 CALC BMI ABV UP PARAM F/U: HCPCS | Performed by: THORACIC SURGERY (CARDIOTHORACIC VASCULAR SURGERY)

## 2021-09-13 PROCEDURE — 99204 OFFICE O/P NEW MOD 45 MIN: CPT | Performed by: THORACIC SURGERY (CARDIOTHORACIC VASCULAR SURGERY)

## 2021-09-13 NOTE — PROGRESS NOTES
CT/CV Surgery New Patient Office Visit      Patient's Name/Date of Birth: Payal Breaux / 1965 (67 y.o.)      PCP: Ursula Campuzano MD    Date: September 13, 2021     CC:   Chief Complaint   Patient presents with    New Patient     Aneurysm on fistula, reports no issues        HPI:   We had the pleasure of seeing Payal Breaux in the office today, as you know this is a very pleasant 49y.o. year old male with a history of hypertension, hyperlipidemia, status post repair of ruptured (?)thoracic aneurysm at the [facility] in 2017, chronic kidney disease stage V, and status post left upper arm AV fistula in 2014. He came to our cardiovascular surgery clinic for surgical evaluation for aneurysmal dilated left upper arm AV fistula. .    He states he has been receiving hemodialysis through the aneurysmally dilated the AV fistula. He denied any episode of excessive bleeding from the AV fistula. He also denied any other complication related to the AV fistula. He is a right-handed person. PastMedical History:  Jeremiah Garay  has a past medical history of AAA (abdominal aortic aneurysm) ([address] NURIS (acute kidney injury) ([address] Anemia associated with chronic renal failure, Arthritis, Cocaine abuse ([address] Depression, Diabetes mellitus ([address] FSGS (focal segmental glomerulosclerosis), Hemodialysis patient ([address] Hemorrhoids, History of blood transfusion, Hyperlipidemia, Hyperphosphatemia, Hypertension, Left renal artery stenosis ([address] Monoclonal (M) protein disease, multiple 'M' protein, Nicotine dependence, Noncompliance, Pneumonia, Psychiatric problem, PTSD (post-traumatic stress disorder), Secondary hyperparathyroidism (of renal origin), and Sleep apnea. He recovered from recent COVID-19 infection. Past Surgical History:  The patient  has a past surgical history that includes Leg Tendon Surgery; EKG 12 Lead (9/24/2015);  Dialysis fistula creation (Left, 07/08/2016); vascular surgery (Left, 07/08/2016); vascular surgery (Right, 1990); Hand tendon surgery (Left, 4/5/2019); and Abdominal aortic aneurysm repair. Allergies: The patient is allergic to humalog [insulin lispro], insulin regular human, and lisinopril. Medications:    Current Outpatient Medications:     Sucroferric Oxyhydroxide (VELPHORO) 500 MG CHEW, Take 500 mg by mouth 4 times daily (before meals and nightly) Take with meals and snacks, Disp: , Rfl:     Multiple Vitamins-Minerals (THERAPEUTIC MULTIVITAMIN-MINERALS) tablet, Take 1 tablet by mouth daily, Disp: , Rfl:     folic acid (FOLVITE) 429 MCG tablet, Take 800 mcg by mouth daily, Disp: , Rfl:     vitamin E 400 UNIT capsule, Take 400 Units by mouth daily, Disp: , Rfl:     ferrous sulfate (FE TABS 325) 325 (65 Fe) MG EC tablet, Take 1 tablet by mouth 3 times daily (with meals) (Patient taking differently: Take 325 mg by mouth daily (with breakfast) ), Disp: 270 tablet, Rfl: 3    nitroGLYCERIN (NITROSTAT) 0.4 MG SL tablet, up to max of 3 total doses.  If no relief after 1 dose, call 911., Disp: 25 tablet, Rfl: 3    acetaminophen (TYLENOL) 325 MG tablet, Take 650 mg by mouth every 4 hours as needed for Pain or Fever, Disp: , Rfl:     B Complex-C-Zn-Folic Acid (DIALYVITE 331-KVFS 15 PO), Take 1 tablet by mouth daily, Disp: , Rfl:     verapamil (CALAN SR) 240 MG extended release tablet, Take 240 mg by mouth daily, Disp: , Rfl:     vitamin D (CHOLECALCIFEROL) 25 MCG (1000 UT) TABS tablet, Take 1,000 Units by mouth 2 times daily Take two tablets twice a day, Disp: , Rfl:     famotidine (PEPCID) 20 MG tablet, Take 1 tablet by mouth daily, Disp: 60 tablet, Rfl: 0    sertraline (ZOLOFT) 100 MG tablet, Take 100 mg by mouth daily, Disp: , Rfl:     hydrOXYzine (ATARAX) 50 MG tablet, Take 50 mg by mouth 3 times daily as needed for Itching, Disp: , Rfl:     aspirin 81 MG EC tablet, Take 1 tablet by mouth daily, Disp: 7 tablet, Rfl: 0    isosorbide mononitrate (IMDUR) 120 MG extended release tablet, Take 1 tablet by mouth daily, Disp: 7 tablet, Rfl: 0    traZODone (DESYREL) 50 MG tablet, Take 1 tablet by mouth nightly as needed for Sleep, Disp: 7 tablet, Rfl: 0    atorvastatin (LIPITOR) 40 MG tablet, Take 1 tablet by mouth nightly, Disp: 7 tablet, Rfl: 0    fluticasone (FLONASE) 50 MCG/ACT nasal spray, 1 spray by Nasal route daily , Disp: , Rfl:     minoxidil (LONITEN) 10 MG tablet, Take 2 tablets by mouth every 12 hours for 28 days (Patient taking differently: Take 10 mg by mouth 2 times daily Take 15mg every 12 hours), Disp: 21 tablet, Rfl: 1    cloNIDine (CATAPRES) 0.3 MG tablet, Take 1 tablet by mouth 3 times daily, Disp: 30 tablet, Rfl: 3    doxazosin (CARDURA) 8 MG tablet, Take 1 tablet by mouth every 12 hours for 28 days (Patient taking differently: Take 8 mg by mouth nightly ), Disp: 14 tablet, Rfl: 3    Family History: This patient's family history includes Cancer in his mother; Other in his brother. Social History:  Adrienne Buckley  reports that he has been smoking cigarettes. He started smoking about 35 years ago. He has a 5.00 pack-year smoking history. He has never used smokeless tobacco. He reports previous drug use. He reports that he does not drink alcohol. Vital Signs:   BP (!) 186/87 (Site: Right Upper Arm, Position: Sitting, Cuff Size: Medium Adult)   Pulse 100   Ht 6' 3\" (1.905 m)   Wt 250 lb (113.4 kg)   BMI 31.25 kg/m²     ROS:   Constitutional: Negative for activity change, chills, fatigue, fever and unexpected weight change. Respiratory: Negative for sleep apnea, shortness of breath, wheezing, stridor, supplementary home oxygen. Cardiac: Negative for midsternal chest pain, arrhythmia, shortness of breath,  Vascular: Negative for claudication, leg  calf muscle pain, hip pain. Gastrointestinal: Negative for hematochezia, melana, constipation, and N/V/D. Musculoskeletal: Negative for myalgias, amputation. Skin: Negative for color change, rash and wound.    Neurological: Negative for dizziness or syncope. Nephrology: Negative for chronic kidney disease,     Physical Exam:  General appearance:  No acute distress, appears stated age and cooperative. Neck: No jugular venous distention. Trachea midline. No carotid bruits. Chest Wall: No deformity, midsternal scar, enlarged palpable supraclavicular lymphnode. A transverse incision scar over the left supraclavicular area from repair of ruptured aneurysm. Respiratory:  Normal respiratory effort. Clear to auscultation, bilaterally without Rales/Wheezes/Rhonch. Cardiovascular:  Regular rate and rhythm with normal S1/S2 without murmurs, rubs or gallops. Abdomen: Soft, non-tender, non-distended with normal bowel sounds. Ext: Large aneurysmally dilated left upper arm AV fistula with two bubbles. Appeared to be brachial artery to cephalic vein AV fistula. No active bleeding or hematoma. Skin: Skin color, texture, turgor normal.  No rashes or lesions. No rubor. No venous stasis pigmentation. Neurologic:  Neurovascularly intact without any focal sensory/motor deficits.     Peripheral Pulses: +2 palpable femoral pulses bilaterally,    Labs:    CBC:  Lab Results   Component Value Date    WBC 5.1 07/26/2021    HGB 11.5 07/26/2021    HCT 38.3 07/26/2021    .9 07/26/2021     07/26/2021    INR 1.03 09/16/2020     BMP:         Problem List:  Patient Active Problem List   Diagnosis    FSGS (focal segmental glomerulosclerosis)    Diabetes mellitus type 2, controlled ([address]    Monoclonal (M) protein disease, multiple 'M' protein    Depression    PTSD (post-traumatic stress disorder)    Secondary renal hyperparathyroidism ([address]    Cocaine use    Substance induced mood disorder ([address]    Renal artery stenosis (HCC) with uncontrollable hypertension    Chronic alcoholism ([address]    Severe cocaine use disorder ([address]    Uncontrolled hypertension    LVH (left ventricular hypertrophy) due to hypertensive disease    Acute on 9/13/21:  1) elective repair/resection of AV fistula. 2) possibility of insertion of tunneled catheter. The risks, benefits, and alternatives were discussed with the patient at length. The risks include bleeding, infection, death, heart attack, stroke, left arm ischemia, and other major complications. He agreed to proceed.         Electronically by Yeison Esposito MD  on 9/13/2021 at 5:18 PM

## 2021-12-18 ENCOUNTER — APPOINTMENT (OUTPATIENT)
Dept: GENERAL RADIOLOGY | Age: 56
End: 2021-12-18
Payer: MEDICARE

## 2021-12-18 ENCOUNTER — HOSPITAL ENCOUNTER (EMERGENCY)
Age: 56
Discharge: HOME | End: 2021-12-18
Payer: MEDICARE

## 2021-12-18 VITALS
TEMPERATURE: 98.3 F | RESPIRATION RATE: 18 BRPM | OXYGEN SATURATION: 94 % | DIASTOLIC BLOOD PRESSURE: 85 MMHG | SYSTOLIC BLOOD PRESSURE: 176 MMHG | HEART RATE: 103 BPM

## 2021-12-18 DIAGNOSIS — J18.9 PNEUMONIA OF BOTH LOWER LOBES DUE TO INFECTIOUS ORGANISM: ICD-10-CM

## 2021-12-18 DIAGNOSIS — J06.9 VIRAL URI WITH COUGH: Primary | ICD-10-CM

## 2021-12-18 LAB
ALBUMIN SERPL-MCNC: 4 G/DL (ref 3.5–5.1)
ALP BLD-CCNC: 129 U/L (ref 38–126)
ALT SERPL-CCNC: 7 U/L (ref 11–66)
ANION GAP SERPL CALCULATED.3IONS-SCNC: 12 MEQ/L (ref 8–16)
AST SERPL-CCNC: 9 U/L (ref 5–40)
BASOPHILS # BLD: 0.2 %
BASOPHILS ABSOLUTE: 0 THOU/MM3 (ref 0–0.1)
BILIRUB SERPL-MCNC: 0.3 MG/DL (ref 0.3–1.2)
BUN BLDV-MCNC: 20 MG/DL (ref 7–22)
CALCIUM SERPL-MCNC: 9 MG/DL (ref 8.5–10.5)
CHLORIDE BLD-SCNC: 99 MEQ/L (ref 98–111)
CO2: 29 MEQ/L (ref 23–33)
CREAT SERPL-MCNC: 4.6 MG/DL (ref 0.4–1.2)
EOSINOPHIL # BLD: 3.1 %
EOSINOPHILS ABSOLUTE: 0.2 THOU/MM3 (ref 0–0.4)
ERYTHROCYTE [DISTWIDTH] IN BLOOD BY AUTOMATED COUNT: 13.1 % (ref 11.5–14.5)
ERYTHROCYTE [DISTWIDTH] IN BLOOD BY AUTOMATED COUNT: 47.8 FL (ref 35–45)
FLU A ANTIGEN: NEGATIVE
FLU B ANTIGEN: NEGATIVE
GFR SERPL CREATININE-BSD FRML MDRD: 16 ML/MIN/1.73M2
GLUCOSE BLD-MCNC: 98 MG/DL (ref 70–108)
HCT VFR BLD CALC: 34.6 % (ref 42–52)
HEMOGLOBIN: 10.7 GM/DL (ref 14–18)
IMMATURE GRANS (ABS): 0.02 THOU/MM3 (ref 0–0.07)
IMMATURE GRANULOCYTES: 0.3 %
LYMPHOCYTES # BLD: 11 %
LYMPHOCYTES ABSOLUTE: 0.6 THOU/MM3 (ref 1–4.8)
MCH RBC QN AUTO: 30.9 PG (ref 26–33)
MCHC RBC AUTO-ENTMCNC: 30.9 GM/DL (ref 32.2–35.5)
MCV RBC AUTO: 100 FL (ref 80–94)
MONOCYTES # BLD: 11.8 %
MONOCYTES ABSOLUTE: 0.7 THOU/MM3 (ref 0.4–1.3)
NUCLEATED RED BLOOD CELLS: 0 /100 WBC
OSMOLALITY CALCULATION: 282 MOSMOL/KG (ref 275–300)
PLATELET # BLD: 131 THOU/MM3 (ref 130–400)
PMV BLD AUTO: 9.8 FL (ref 9.4–12.4)
POTASSIUM REFLEX MAGNESIUM: 4.5 MEQ/L (ref 3.5–5.2)
RBC # BLD: 3.46 MILL/MM3 (ref 4.7–6.1)
SARS-COV-2, NAAT: NOT  DETECTED
SEG NEUTROPHILS: 73.6 %
SEGMENTED NEUTROPHILS ABSOLUTE COUNT: 4.3 THOU/MM3 (ref 1.8–7.7)
SODIUM BLD-SCNC: 140 MEQ/L (ref 135–145)
TOTAL PROTEIN: 7.6 G/DL (ref 6.1–8)
WBC # BLD: 5.8 THOU/MM3 (ref 4.8–10.8)

## 2021-12-18 PROCEDURE — 87635 SARS-COV-2 COVID-19 AMP PRB: CPT

## 2021-12-18 PROCEDURE — 6370000000 HC RX 637 (ALT 250 FOR IP): Performed by: PHYSICIAN ASSISTANT

## 2021-12-18 PROCEDURE — 80053 COMPREHEN METABOLIC PANEL: CPT

## 2021-12-18 PROCEDURE — 87804 INFLUENZA ASSAY W/OPTIC: CPT

## 2021-12-18 PROCEDURE — 99282 EMERGENCY DEPT VISIT SF MDM: CPT

## 2021-12-18 PROCEDURE — 71045 X-RAY EXAM CHEST 1 VIEW: CPT

## 2021-12-18 PROCEDURE — 93005 ELECTROCARDIOGRAM TRACING: CPT | Performed by: PHYSICIAN ASSISTANT

## 2021-12-18 PROCEDURE — 85025 COMPLETE CBC W/AUTO DIFF WBC: CPT

## 2021-12-18 PROCEDURE — 36415 COLL VENOUS BLD VENIPUNCTURE: CPT

## 2021-12-18 RX ORDER — BENZONATATE 100 MG/1
100 CAPSULE ORAL 3 TIMES DAILY PRN
Qty: 21 CAPSULE | Refills: 0 | Status: SHIPPED | OUTPATIENT
Start: 2021-12-18 | End: 2021-12-25

## 2021-12-18 RX ORDER — DOXYCYCLINE HYCLATE 100 MG
100 TABLET ORAL ONCE
Status: COMPLETED | OUTPATIENT
Start: 2021-12-18 | End: 2021-12-18

## 2021-12-18 RX ORDER — DOXYCYCLINE HYCLATE 100 MG
100 TABLET ORAL 2 TIMES DAILY
Qty: 20 TABLET | Refills: 0 | Status: SHIPPED | OUTPATIENT
Start: 2021-12-18 | End: 2021-12-28

## 2021-12-18 RX ADMIN — DOXYCYCLINE HYCLATE 100 MG: 100 TABLET, COATED ORAL at 14:41

## 2021-12-18 NOTE — ED TRIAGE NOTES
Pt presents to the ed with c/o cough and congestion. Pt states it has been going on for 3-4 days with no relief.

## 2021-12-19 LAB
EKG ATRIAL RATE: 97 BPM
EKG P AXIS: 63 DEGREES
EKG P-R INTERVAL: 188 MS
EKG Q-T INTERVAL: 408 MS
EKG QRS DURATION: 106 MS
EKG QTC CALCULATION (BAZETT): 518 MS
EKG R AXIS: -36 DEGREES
EKG T AXIS: 99 DEGREES
EKG VENTRICULAR RATE: 97 BPM

## 2021-12-21 ASSESSMENT — ENCOUNTER SYMPTOMS
COUGH: 1
DIARRHEA: 0
CHEST TIGHTNESS: 0
WHEEZING: 0
NAUSEA: 0
ABDOMINAL PAIN: 0
SINUS PAIN: 0
SHORTNESS OF BREATH: 0
SORE THROAT: 0
RHINORRHEA: 0
SINUS PRESSURE: 0
VOMITING: 0

## 2021-12-22 NOTE — ED PROVIDER NOTES
[facility] [address] [address] EMERGENCY DEPT    EMERGENCY MEDICINE     Pt Name: Yuly Cottrell  MRN: [id number]  [place] 1965  Date of evaluation: 12/18/2021  Provider: Marc Soler PA-C    CHIEF COMPLAINT       Chief Complaint   Patient presents with    Cough    Chest Congestion       HISTORY OF PRESENT ILLNESS    Yuly Cottrell is a pleasant 49 y.o. male who presents to the emergency department for coughing, congestion. Patient has 3-day onset of coughing, congestion, mild off-and-on headache. He states the cough is productive but has been clear. Is currently on dialysis. Had Covid 19 January 2021 previously. Has not been vaccinated since. Had flu shot 1 to 2 months ago. He has no current complaints of chest pain, shortness of breath that is worse than his baseline, nausea, vomiting, diarrhea, abdominal pain, fevers, chills, ear pain. States is getting progressively worse. Triage notes and Nursing notes were reviewed by myself. Any discrepancies are addressed above. PAST MEDICAL HISTORY     Past Medical History:   Diagnosis Date    AAA (abdominal aortic aneurysm) ([address]     NURIS (acute kidney injury) ([address] 9/24/2015    Anemia associated with chronic renal failure     Arthritis     stated in hands    Cocaine abuse ([address] 5/10/2014    Depression     Diabetes mellitus ([address]     pt states he no longer has diabetes he has lost alot of davion.      FSGS (focal segmental glomerulosclerosis) 5/23/2013    Hemodialysis patient ([facility] 10/17/2016    on hemodialysis with [facility] 1/16/2012    History of blood transfusion     Hyperlipidemia     Hyperphosphatemia 5/21/2016    Hypertension     Left renal artery stenosis ([address] 5/22/2014    Monoclonal (M) protein disease, multiple 'M' protein     Nicotine dependence 6/16/2014    Noncompliance     Pneumonia     Psychiatric problem     PTSD (post-traumatic stress disorder)     Pt vet from [facility] Secondary hyperparathyroidism (of renal origin)     Sleep apnea        SURGICAL HISTORY       Past Surgical History:   Procedure Laterality Date    ABDOMINAL AORTIC ANEURYSM REPAIR      DIALYSIS FISTULA CREATION Left 07/08/2016    EKG 12-LEAD  9/24/2015         HAND TENDON SURGERY Left 4/5/2019    LEFT THUMB FLEXOR TENDON REPAIR performed by Iris Thorne MD at [facility] VASCULAR SURGERY Left 07/08/2016    AV Fistula    VASCULAR SURGERY Right 1990    Surgery on Achilles tendon       CURRENT MEDICATIONS       Discharge Medication List as of 12/18/2021  2:27 PM      CONTINUE these medications which have NOT CHANGED    Details   Sucroferric Oxyhydroxide (VELPHORO) 500 MG CHEW Take 500 mg by mouth 4 times daily (before meals and nightly) Take with meals and snacksHistorical Med      Multiple Vitamins-Minerals (THERAPEUTIC MULTIVITAMIN-MINERALS) tablet Take 1 tablet by mouth dailyHistorical Med      folic acid (FOLVITE) 278 MCG tablet Take 800 mcg by mouth dailyHistorical Med      vitamin E 400 UNIT capsule Take 400 Units by mouth dailyHistorical Med      minoxidil (LONITEN) 10 MG tablet Take 2 tablets by mouth every 12 hours for 28 days, Disp-21 tablet,R-1Normal      ferrous sulfate (FE TABS 325) 325 (65 Fe) MG EC tablet Take 1 tablet by mouth 3 times daily (with meals), Disp-270 tablet, R-3Normal      nitroGLYCERIN (NITROSTAT) 0.4 MG SL tablet up to max of 3 total doses.  If no relief after 1 dose, call 911., Disp-25 tablet, R-3Normal      acetaminophen (TYLENOL) 325 MG tablet Take 650 mg by mouth every 4 hours as needed for Pain or FeverHistorical Med      B Complex-C-Zn-Folic Acid (DIALYVITE 366-KUPC 15 PO) Take 1 tablet by mouth dailyHistorical Med      verapamil (CALAN SR) 240 MG extended release tablet Take 240 mg by mouth dailyHistorical Med      vitamin D (CHOLECALCIFEROL) 25 MCG (1000 UT) TABS tablet Take 1,000 Units by mouth 2 times daily Take two tablets twice a dayHistorical Med      cloNIDine (CATAPRES) 0.3 MG tablet Take 1 tablet by mouth 3 times daily, Disp-30 tablet,R-3Normal      doxazosin (CARDURA) 8 MG tablet Take 1 tablet by mouth every 12 hours for 28 days, Disp-14 tablet,R-3Normal      famotidine (PEPCID) 20 MG tablet Take 1 tablet by mouth daily, Disp-60 tablet, R-0Normal      sertraline (ZOLOFT) 100 MG tablet Take 100 mg by mouth dailyHistorical Med      hydrOXYzine (ATARAX) 50 MG tablet Take 50 mg by mouth 3 times daily as needed for ItchingHistorical Med      aspirin 81 MG EC tablet Take 1 tablet by mouth daily, Disp-7 tablet, R-0Print      isosorbide mononitrate (IMDUR) 120 MG extended release tablet Take 1 tablet by mouth daily, Disp-7 tablet, R-0Print      traZODone (DESYREL) 50 MG tablet Take 1 tablet by mouth nightly as needed for Sleep, Disp-7 tablet, R-0Print      atorvastatin (LIPITOR) 40 MG tablet Take 1 tablet by mouth nightly, Disp-7 tablet, R-0Print      fluticasone (FLONASE) 50 MCG/ACT nasal spray 1 spray by Nasal route daily Historical Med             ALLERGIES     Humalog [insulin lispro], Insulin regular human, and Lisinopril    FAMILY HISTORY       Family History   Problem Relation Age of Onset    Cancer Mother     Other Brother         aneurysm         SOCIAL HISTORY       Social History     Socioeconomic History    Marital status:      Spouse name: Andrea    Number of children: 2    Years of education: 12    Highest education level: Not on file   Occupational History     Employer: UNEMPLOYED   Tobacco Use    Smoking status: Current Some Day Smoker     Packs/day: 0.25     Years: 20.00     Pack years: 5.00     Types: Cigarettes     Start date: 10/11/1985    Smokeless tobacco: Never Used    Tobacco comment: occasional   Vaping Use    Vaping Use: Never used   Substance and Sexual Activity    Alcohol use: No    Drug use: Not Currently     Comment: hx of    Sexual activity: Yes     Partners: Female   Other Topics Concern    Not on file   Social History Narrative    Not on file     Social Determinants of Health     Financial Resource Strain:     Difficulty of Paying Living Expenses: Not on file   Food Insecurity:     Worried About Running Out of Food in the Last Year: Not on file    Christi of Food in the Last Year: Not on file   Transportation Needs:     Lack of Transportation (Medical): Not on file    Lack of Transportation (Non-Medical): Not on file   Physical Activity:     Days of Exercise per Week: Not on file    Minutes of Exercise per Session: Not on file   Stress:     Feeling of Stress : Not on file   Social Connections:     Frequency of Communication with Friends and Family: Not on file    Frequency of Social Gatherings with Friends and Family: Not on file    Attends Evangelical Services: Not on file    Active Member of [address] [place] [address] [employer] or Organizations: Not on file    Attends Club or Organization Meetings: Not on file    Marital Status: Not on file   Intimate Partner Violence:     Fear of Current or Ex-Partner: Not on file    Emotionally Abused: Not on file    Physically Abused: Not on file    Sexually Abused: Not on file   Housing Stability:     Unable to Pay for Housing in the Last Year: Not on file    Number of [place] in the Last Year: Not on file    Unstable Housing in the Last Year: Not on file       REVIEW OF SYSTEMS     Review of Systems   Constitutional: Negative for chills and fever. HENT: Positive for congestion. Negative for ear pain, rhinorrhea, sinus pressure, sinus pain and sore throat. Respiratory: Positive for cough. Negative for chest tightness, shortness of breath and wheezing. Cardiovascular: Negative for chest pain and palpitations. Gastrointestinal: Negative for abdominal pain, diarrhea, nausea and vomiting. Genitourinary: Negative for dysuria and urgency. Musculoskeletal: Negative for myalgias and neck pain. Skin: Negative for rash. Neurological: Positive for headaches. Except as noted above the remainder of the review of systems was reviewed and is. PHYSICAL EXAM     INITIAL VITALS:  oral temperature is 98.3 °F (36.8 °C). His blood pressure is 176/85 (abnormal) and his pulse is 103. His respiration is 18 and oxygen saturation is 94%. Physical Exam  Vitals and nursing note reviewed. Exam conducted with a chaperone present. Constitutional:       General: He is not in acute distress. Appearance: Normal appearance. He is normal weight. He is not ill-appearing, toxic-appearing or diaphoretic. HENT:      Head: Normocephalic and atraumatic. Right Ear: Tympanic membrane, ear canal and external ear normal. There is no impacted cerumen. Left Ear: Tympanic membrane, ear canal and external ear normal. There is no impacted cerumen. Nose: Congestion and rhinorrhea present. Mouth/Throat:      Mouth: Mucous membranes are moist.      Pharynx: No oropharyngeal exudate or posterior oropharyngeal erythema. Eyes:      Pupils: Pupils are equal, round, and reactive to light. Cardiovascular:      Rate and Rhythm: Normal rate and regular rhythm. Pulses: Normal pulses. Heart sounds: Normal heart sounds. Pulmonary:      Effort: Pulmonary effort is normal. No respiratory distress or retractions. Breath sounds: Examination of the right-upper field reveals decreased breath sounds. Examination of the right-lower field reveals decreased breath sounds. Examination of the left-lower field reveals decreased breath sounds. Decreased breath sounds present. No wheezing, rhonchi or rales. Abdominal:      General: There is no distension. Palpations: Abdomen is soft. Tenderness: There is no abdominal tenderness. There is no right CVA tenderness, left CVA tenderness or guarding. Musculoskeletal:         General: Normal range of motion. Cervical back: Normal range of motion and neck supple.    Lymphadenopathy:      Cervical: No cervical adenopathy. Skin:     General: Skin is warm and dry. Neurological:      Mental Status: He is alert and oriented to person, place, and time. Psychiatric:         Mood and Affect: Mood normal.         Behavior: Behavior normal.         Thought Content: Thought content normal.         DIFFERENTIAL DIAGNOSIS:   Differential diagnoses are discussed    DIAGNOSTIC RESULTS     EKG: All EKG's are interpreted by the Emergency Department Physician who either signs or Co-signsthis chart in the absence of a cardiologist.    Component Ref Range & Units 12/18/21 1258 9/17/20 2026 9/15/20 0920 9/2/20 0327 7/27/20 1448 7/6/20 1857 3/9/20 0334   Ventricular Rate BPM 97  76  92  98  95  98  89    Atrial Rate BPM 97  76  92  98  95  98  89    P-R Interval ms 188  198  196  172  196  198  200    QRS Duration ms 106  108  94  102  98  112  98    Q-T Interval ms 408  452  400  390  394  376  400    QTc Calculation (Bazett) ms 518  508  494  497  495  480  486    P Axis degrees 63  44  61  36  53  52  10    R Axis degrees -36  -33  -34  -38  -42  -52  -40    T Axis degrees 99  137  118  101  97  96  113    Resulting Agency  [address]             Narrative & Impression    Normal sinus rhythm with sinus arrhythmia  Possible Left atrial enlargement  Left axis deviation  LVH with repolarization abnormality ( R in aVL , Sokolow-Marte , Amity product , Romhilt-Rueda )  Anterior infarct , age undetermined  Prolonged QT interval or tu fusion, consider myocardial disease, electrolyte imbalance, or drug effects  Abnormal ECG  Confirmed by Socrates Billings ([id number]) on 12/19/2021 7:30:34 PM                 RADIOLOGY: non-plain film images(s) such as CT, Ultrasound and MRI are read by the radiologist.    XR CHEST PORTABLE   Final Result   1. Bilateral lower lobe consolidations. Patchy right upper lobe opacities.  Findings can relate to pneumonia, pulmonary edema, and/or hemorrhage. 2. Small right pleural effusion. Very small left pleural effusion. 3. Compressive atelectasis in the right lung base. **This report has been created using voice recognition software. It may contain minor errors which are inherent in voice recognition technology. **      Final report electronically signed by Dr Jama Turner on 12/18/2021 1:39 PM          LABS:   Labs Reviewed   CBC WITH AUTO DIFFERENTIAL - Abnormal; Notable for the following components:       Result Value    RBC 3.46 (*)     Hemoglobin 10.7 (*)     Hematocrit 34.6 (*)     .0 (*)     MCHC 30.9 (*)     RDW-SD 47.8 (*)     Lymphocytes Absolute 0.6 (*)     All other components within normal limits   COMPREHENSIVE METABOLIC PANEL W/ REFLEX TO MG FOR LOW K - Abnormal; Notable for the following components:    CREATININE 4.6 (*)     Alkaline Phosphatase 129 (*)     ALT 7 (*)     All other components within normal limits   GLOMERULAR FILTRATION RATE, ESTIMATED - Abnormal; Notable for the following components:    Est, Glom Filt Rate 16 (*)     All other components within normal limits   COVID-19, RAPID   RAPID INFLUENZA A/B ANTIGENS   ANION GAP   OSMOLALITY       EMERGENCY DEPARTMENT COURSE:   Vitals:    Vitals:    12/18/21 1219   BP: (!) 176/85   Pulse: 103   Resp: 18   Temp: 98.3 °F (36.8 °C)   TempSrc: Oral   SpO2: 94%     11:21 PM EST: The patient was seen and evaluated. MDM:  Patient is 51-year-old male that has progressive worsening coughing, congestion for the past 3 days. He states he is currently on dialysis. Viral swabs of COVID-19 and rapid influenza were done that came back negative. Blood work of CBC, CMP were done that showed creatinine and GFR at his baseline. No other acute findings or emergent findings. EKG showed no acute findings. Chest x-ray did demonstrate bilateral lower lobe consolidation.   With demonstrations on physical exam and chest x-ray recommend antibiotics at this point to treat the pneumonia. Will prescribe benzonatate for antitussive. Recommend Tylenol/Profen for fevers and pain. Patient will  antibiotic today and start prescription today. Recommend follow-up with PCP in the next 3 to 4 days for reevaluation. If worsening trends of coughing, shortness of breath, intractable fevers greater than 24 hours follow-up to the ED in the next 1 to 2 days. Patient understands and agrees to treatment plan. I have given the patient strict written and verbal instructions about care at home, follow-up, and signs and symptoms of worsening of condition and they did verbalize understanding. Patient understands and agrees to the treatment plan. CRITICAL CARE:   None    CONSULTS:  None    PROCEDURES:  None    FINAL IMPRESSION      1. Viral URI with cough    2.  Pneumonia of both lower lobes due to infectious organism          DISPOSITION/PLAN   Discharged home    PATIENT REFERRED TO:  MD Antonette Zee [phone]. [phone]    In 3 days  If not improving    [facility] EMERGENCY DEPT  [address] [place] [address]  [phone]  In 2 days  If symptoms worsen      DISCHARGEMEDICATIONS:  Discharge Medication List as of 12/18/2021  2:27 PM      START taking these medications    Details   doxycycline hyclate (VIBRA-TABS) 100 MG tablet Take 1 tablet by mouth 2 times daily for 10 days, Disp-20 tablet, R-0Normal      benzonatate (TESSALON PERLES) 100 MG capsule Take 1 capsule by mouth 3 times daily as needed for Cough, Disp-21 capsule, R-0Normal                 (Please note that portions of this note were completed with a voice recognition program.  Efforts were made to edit the dictations but occasionallywords are mis-transcribed.)      Rain Rizzo PA-C(electronically signed)  Physician Associate, Emergency Department       Rain Rizzo PA-C  12/21/21 4232

## 2022-06-06 NOTE — ED TRIAGE NOTES
Pt presents ambulatory to ER with complaints of L forearm swelling/pain with short arm splint applied this morning for tendon injury. PMS intact at this time. 1.4

## 2022-09-28 ENCOUNTER — HOSPITAL ENCOUNTER (INPATIENT)
Age: 57
LOS: 17 days | Discharge: HOME HEALTH | DRG: 163 | End: 2022-10-15
Attending: EMERGENCY MEDICINE | Admitting: PHYSICIAN ASSISTANT
Payer: MEDICARE

## 2022-09-28 ENCOUNTER — APPOINTMENT (OUTPATIENT)
Dept: GENERAL RADIOLOGY | Age: 57
DRG: 163 | End: 2022-09-28
Payer: MEDICARE

## 2022-09-28 DIAGNOSIS — Z98.890 STATUS POST THORACOTOMY: ICD-10-CM

## 2022-09-28 DIAGNOSIS — J96.02 ACUTE RESPIRATORY FAILURE WITH HYPOXIA AND HYPERCARBIA (HCC): Primary | ICD-10-CM

## 2022-09-28 DIAGNOSIS — N18.6 ESRD (END STAGE RENAL DISEASE) (HCC): ICD-10-CM

## 2022-09-28 DIAGNOSIS — R09.02 HYPOXIA: ICD-10-CM

## 2022-09-28 DIAGNOSIS — J96.01 ACUTE RESPIRATORY FAILURE WITH HYPOXIA AND HYPERCARBIA (HCC): Primary | ICD-10-CM

## 2022-09-28 DIAGNOSIS — J98.19 TRAPPED LUNG: ICD-10-CM

## 2022-09-28 LAB
ALBUMIN SERPL-MCNC: 3.8 G/DL (ref 3.5–5.1)
ALLEN TEST: POSITIVE
ALLEN TEST: POSITIVE
ALP BLD-CCNC: 95 U/L (ref 38–126)
ALT SERPL-CCNC: 11 U/L (ref 11–66)
ANION GAP SERPL CALCULATED.3IONS-SCNC: 13 MEQ/L (ref 8–16)
AST SERPL-CCNC: 12 U/L (ref 5–40)
BASE EXCESS (CALCULATED): 1.2 MMOL/L (ref -2.5–2.5)
BASE EXCESS (CALCULATED): 1.2 MMOL/L (ref -2.5–2.5)
BASOPHILS # BLD: 0.3 %
BASOPHILS ABSOLUTE: 0 THOU/MM3 (ref 0–0.1)
BILIRUB SERPL-MCNC: 0.3 MG/DL (ref 0.3–1.2)
BUN BLDV-MCNC: 42 MG/DL (ref 7–22)
CALCIUM SERPL-MCNC: 8.5 MG/DL (ref 8.5–10.5)
CHLORIDE BLD-SCNC: 102 MEQ/L (ref 98–111)
CO2: 27 MEQ/L (ref 23–33)
COLLECTED BY:: ABNORMAL
COLLECTED BY:: ABNORMAL
CREAT SERPL-MCNC: 7.6 MG/DL (ref 0.4–1.2)
DEVICE: ABNORMAL
DEVICE: ABNORMAL
EKG ATRIAL RATE: 94 BPM
EKG P AXIS: 101 DEGREES
EKG P-R INTERVAL: 192 MS
EKG Q-T INTERVAL: 386 MS
EKG QRS DURATION: 102 MS
EKG QTC CALCULATION (BAZETT): 482 MS
EKG R AXIS: -144 DEGREES
EKG T AXIS: 71 DEGREES
EKG VENTRICULAR RATE: 94 BPM
EOSINOPHIL # BLD: 1.6 %
EOSINOPHILS ABSOLUTE: 0.1 THOU/MM3 (ref 0–0.4)
ERYTHROCYTE [DISTWIDTH] IN BLOOD BY AUTOMATED COUNT: 13.2 % (ref 11.5–14.5)
ERYTHROCYTE [DISTWIDTH] IN BLOOD BY AUTOMATED COUNT: 49.9 FL (ref 35–45)
FLU A ANTIGEN: NEGATIVE
FLU B ANTIGEN: NEGATIVE
GFR SERPL CREATININE-BSD FRML MDRD: 9 ML/MIN/1.73M2
GLUCOSE BLD-MCNC: 131 MG/DL (ref 70–108)
GLUCOSE, FLUID: 84 MG/DL
HCO3: 29 MMOL/L (ref 23–28)
HCO3: 29 MMOL/L (ref 23–28)
HCT VFR BLD CALC: 36.3 % (ref 42–52)
HEMOGLOBIN: 11.1 GM/DL (ref 14–18)
IFIO2: 100
IFIO2: 40
IMMATURE GRANS (ABS): 0.02 THOU/MM3 (ref 0–0.07)
IMMATURE GRANULOCYTES: 0.3 %
LACTIC ACID: 0.9 MMOL/L (ref 0.5–2)
LD, FLUID: 452 U/L
LYMPHOCYTES # BLD: 11.3 %
LYMPHOCYTES ABSOLUTE: 0.7 THOU/MM3 (ref 1–4.8)
MCH RBC QN AUTO: 31.5 PG (ref 26–33)
MCHC RBC AUTO-ENTMCNC: 30.6 GM/DL (ref 32.2–35.5)
MCV RBC AUTO: 103.1 FL (ref 80–94)
MONOCYTES # BLD: 6.9 %
MONOCYTES ABSOLUTE: 0.4 THOU/MM3 (ref 0.4–1.3)
NUCLEATED RED BLOOD CELLS: 0 /100 WBC
O2 SATURATION: 100 %
O2 SATURATION: 97 %
OSMOLALITY CALCULATION: 295.4 MOSMOL/KG (ref 275–300)
PCO2: 57 MMHG (ref 35–45)
PCO2: 64 MMHG (ref 35–45)
PH BLOOD GAS: 7.27 (ref 7.35–7.45)
PH BLOOD GAS: 7.31 (ref 7.35–7.45)
PH FLUID: 7.91
PIP: 16 CMH2O
PLATELET # BLD: 119 THOU/MM3 (ref 130–400)
PMV BLD AUTO: 9.6 FL (ref 9.4–12.4)
PO2: 101 MMHG (ref 71–104)
PO2: 190 MMHG (ref 71–104)
POTASSIUM REFLEX MAGNESIUM: 4.6 MEQ/L (ref 3.5–5.2)
PRO-BNP: ABNORMAL PG/ML (ref 0–900)
PROTEIN FLUID: 3.7 GM/DL
RBC # BLD: 3.52 MILL/MM3 (ref 4.7–6.1)
SARS-COV-2, NAAT: NOT  DETECTED
SEG NEUTROPHILS: 79.6 %
SEGMENTED NEUTROPHILS ABSOLUTE COUNT: 5.1 THOU/MM3 (ref 1.8–7.7)
SET PEEP: 6 MMHG
SODIUM BLD-SCNC: 142 MEQ/L (ref 135–145)
SOURCE, BLOOD GAS: ABNORMAL
SOURCE, BLOOD GAS: ABNORMAL
TOTAL PROTEIN: 7.2 G/DL (ref 6.1–8)
TROPONIN T: 0.05 NG/ML
WBC # BLD: 6.4 THOU/MM3 (ref 4.8–10.8)

## 2022-09-28 PROCEDURE — 6370000000 HC RX 637 (ALT 250 FOR IP): Performed by: PHYSICIAN ASSISTANT

## 2022-09-28 PROCEDURE — 84155 ASSAY OF PROTEIN SERUM: CPT

## 2022-09-28 PROCEDURE — 99223 1ST HOSP IP/OBS HIGH 75: CPT | Performed by: INTERNAL MEDICINE

## 2022-09-28 PROCEDURE — 87040 BLOOD CULTURE FOR BACTERIA: CPT

## 2022-09-28 PROCEDURE — 5A1D70Z PERFORMANCE OF URINARY FILTRATION, INTERMITTENT, LESS THAN 6 HOURS PER DAY: ICD-10-PCS | Performed by: INTERNAL MEDICINE

## 2022-09-28 PROCEDURE — 71045 X-RAY EXAM CHEST 1 VIEW: CPT

## 2022-09-28 PROCEDURE — 6360000002 HC RX W HCPCS: Performed by: PHYSICIAN ASSISTANT

## 2022-09-28 PROCEDURE — 6360000002 HC RX W HCPCS

## 2022-09-28 PROCEDURE — 87804 INFLUENZA ASSAY W/OPTIC: CPT

## 2022-09-28 PROCEDURE — 83615 LACTATE (LD) (LDH) ENZYME: CPT

## 2022-09-28 PROCEDURE — 36415 COLL VENOUS BLD VENIPUNCTURE: CPT

## 2022-09-28 PROCEDURE — 87070 CULTURE OTHR SPECIMN AEROBIC: CPT

## 2022-09-28 PROCEDURE — 83986 ASSAY PH BODY FLUID NOS: CPT

## 2022-09-28 PROCEDURE — 99223 1ST HOSP IP/OBS HIGH 75: CPT | Performed by: PHYSICIAN ASSISTANT

## 2022-09-28 PROCEDURE — 90935 HEMODIALYSIS ONE EVALUATION: CPT

## 2022-09-28 PROCEDURE — 99285 EMERGENCY DEPT VISIT HI MDM: CPT

## 2022-09-28 PROCEDURE — 2700000000 HC OXYGEN THERAPY PER DAY

## 2022-09-28 PROCEDURE — 85025 COMPLETE CBC W/AUTO DIFF WBC: CPT

## 2022-09-28 PROCEDURE — 83605 ASSAY OF LACTIC ACID: CPT

## 2022-09-28 PROCEDURE — 84157 ASSAY OF PROTEIN OTHER: CPT

## 2022-09-28 PROCEDURE — 93005 ELECTROCARDIOGRAM TRACING: CPT | Performed by: EMERGENCY MEDICINE

## 2022-09-28 PROCEDURE — 0BHK33Z INSERTION OF INFUSION DEVICE INTO RIGHT LUNG, PERCUTANEOUS APPROACH: ICD-10-PCS | Performed by: INTERNAL MEDICINE

## 2022-09-28 PROCEDURE — 88108 CYTOPATH CONCENTRATE TECH: CPT

## 2022-09-28 PROCEDURE — 80053 COMPREHEN METABOLIC PANEL: CPT

## 2022-09-28 PROCEDURE — 82803 BLOOD GASES ANY COMBINATION: CPT

## 2022-09-28 PROCEDURE — 94660 CPAP INITIATION&MGMT: CPT

## 2022-09-28 PROCEDURE — 6370000000 HC RX 637 (ALT 250 FOR IP): Performed by: INTERNAL MEDICINE

## 2022-09-28 PROCEDURE — 93010 ELECTROCARDIOGRAM REPORT: CPT | Performed by: NUCLEAR MEDICINE

## 2022-09-28 PROCEDURE — 2580000003 HC RX 258: Performed by: PHYSICIAN ASSISTANT

## 2022-09-28 PROCEDURE — 83880 ASSAY OF NATRIURETIC PEPTIDE: CPT

## 2022-09-28 PROCEDURE — 6360000002 HC RX W HCPCS: Performed by: INTERNAL MEDICINE

## 2022-09-28 PROCEDURE — 89050 BODY FLUID CELL COUNT: CPT

## 2022-09-28 PROCEDURE — 82945 GLUCOSE OTHER FLUID: CPT

## 2022-09-28 PROCEDURE — 88112 CYTOPATH CELL ENHANCE TECH: CPT

## 2022-09-28 PROCEDURE — 84484 ASSAY OF TROPONIN QUANT: CPT

## 2022-09-28 PROCEDURE — 32551 INSERTION OF CHEST TUBE: CPT | Performed by: INTERNAL MEDICINE

## 2022-09-28 PROCEDURE — 87075 CULTR BACTERIA EXCEPT BLOOD: CPT

## 2022-09-28 PROCEDURE — 87635 SARS-COV-2 COVID-19 AMP PRB: CPT

## 2022-09-28 PROCEDURE — 36600 WITHDRAWAL OF ARTERIAL BLOOD: CPT

## 2022-09-28 PROCEDURE — 87205 SMEAR GRAM STAIN: CPT

## 2022-09-28 PROCEDURE — 2060000000 HC ICU INTERMEDIATE R&B

## 2022-09-28 PROCEDURE — 94761 N-INVAS EAR/PLS OXIMETRY MLT: CPT

## 2022-09-28 PROCEDURE — 88305 TISSUE EXAM BY PATHOLOGIST: CPT

## 2022-09-28 RX ORDER — MULTIVITAMIN WITH IRON
1 TABLET ORAL DAILY
Status: DISCONTINUED | OUTPATIENT
Start: 2022-09-28 | End: 2022-10-15 | Stop reason: HOSPADM

## 2022-09-28 RX ORDER — DOXAZOSIN MESYLATE 4 MG/1
8 TABLET ORAL NIGHTLY
Status: DISCONTINUED | OUTPATIENT
Start: 2022-09-28 | End: 2022-10-12

## 2022-09-28 RX ORDER — HYDROCODONE BITARTRATE AND ACETAMINOPHEN 5; 325 MG/1; MG/1
1 TABLET ORAL EVERY 6 HOURS PRN
Status: DISCONTINUED | OUTPATIENT
Start: 2022-09-28 | End: 2022-10-11 | Stop reason: SDUPTHER

## 2022-09-28 RX ORDER — FERROUS SULFATE 325(65) MG
325 TABLET ORAL
Status: DISCONTINUED | OUTPATIENT
Start: 2022-09-29 | End: 2022-10-15 | Stop reason: HOSPADM

## 2022-09-28 RX ORDER — ONDANSETRON 4 MG/1
4 TABLET, ORALLY DISINTEGRATING ORAL EVERY 8 HOURS PRN
Status: DISCONTINUED | OUTPATIENT
Start: 2022-09-28 | End: 2022-10-15 | Stop reason: HOSPADM

## 2022-09-28 RX ORDER — HYDROCODONE BITARTRATE AND ACETAMINOPHEN 5; 325 MG/1; MG/1
2 TABLET ORAL EVERY 6 HOURS PRN
Status: DISCONTINUED | OUTPATIENT
Start: 2022-09-28 | End: 2022-10-11 | Stop reason: SDUPTHER

## 2022-09-28 RX ORDER — HEPARIN SODIUM 5000 [USP'U]/ML
5000 INJECTION, SOLUTION INTRAVENOUS; SUBCUTANEOUS EVERY 8 HOURS SCHEDULED
Status: DISPENSED | OUTPATIENT
Start: 2022-09-28 | End: 2022-10-09

## 2022-09-28 RX ORDER — SODIUM CHLORIDE 0.9 % (FLUSH) 0.9 %
10 SYRINGE (ML) INJECTION PRN
Status: DISCONTINUED | OUTPATIENT
Start: 2022-09-28 | End: 2022-10-15 | Stop reason: HOSPADM

## 2022-09-28 RX ORDER — ISOSORBIDE MONONITRATE 60 MG/1
120 TABLET, EXTENDED RELEASE ORAL DAILY
Status: DISCONTINUED | OUTPATIENT
Start: 2022-09-28 | End: 2022-10-12

## 2022-09-28 RX ORDER — SODIUM CHLORIDE 0.9 % (FLUSH) 0.9 %
10 SYRINGE (ML) INJECTION EVERY 12 HOURS SCHEDULED
Status: DISCONTINUED | OUTPATIENT
Start: 2022-09-28 | End: 2022-10-15 | Stop reason: HOSPADM

## 2022-09-28 RX ORDER — FAMOTIDINE 20 MG/1
20 TABLET, FILM COATED ORAL DAILY
Status: DISCONTINUED | OUTPATIENT
Start: 2022-09-28 | End: 2022-10-15 | Stop reason: HOSPADM

## 2022-09-28 RX ORDER — ACETAMINOPHEN 325 MG/1
650 TABLET ORAL EVERY 6 HOURS PRN
Status: DISCONTINUED | OUTPATIENT
Start: 2022-09-28 | End: 2022-09-28

## 2022-09-28 RX ORDER — VERAPAMIL HYDROCHLORIDE 240 MG/1
240 TABLET, FILM COATED, EXTENDED RELEASE ORAL DAILY
Status: DISCONTINUED | OUTPATIENT
Start: 2022-09-28 | End: 2022-10-15 | Stop reason: HOSPADM

## 2022-09-28 RX ORDER — FLUTICASONE PROPIONATE 50 MCG
1 SPRAY, SUSPENSION (ML) NASAL DAILY
Status: DISCONTINUED | OUTPATIENT
Start: 2022-09-28 | End: 2022-10-15 | Stop reason: HOSPADM

## 2022-09-28 RX ORDER — ACETAMINOPHEN 325 MG/1
650 TABLET ORAL EVERY 6 HOURS PRN
Status: DISCONTINUED | OUTPATIENT
Start: 2022-09-28 | End: 2022-10-15 | Stop reason: HOSPADM

## 2022-09-28 RX ORDER — MORPHINE SULFATE 2 MG/ML
INJECTION, SOLUTION INTRAMUSCULAR; INTRAVENOUS
Status: COMPLETED
Start: 2022-09-28 | End: 2022-09-28

## 2022-09-28 RX ORDER — ATORVASTATIN CALCIUM 40 MG/1
40 TABLET, FILM COATED ORAL NIGHTLY
Status: DISCONTINUED | OUTPATIENT
Start: 2022-09-28 | End: 2022-10-15 | Stop reason: HOSPADM

## 2022-09-28 RX ORDER — POLYETHYLENE GLYCOL 3350 17 G/17G
17 POWDER, FOR SOLUTION ORAL DAILY PRN
Status: DISCONTINUED | OUTPATIENT
Start: 2022-09-28 | End: 2022-10-15 | Stop reason: HOSPADM

## 2022-09-28 RX ORDER — ASPIRIN 81 MG/1
81 TABLET ORAL DAILY
Status: DISCONTINUED | OUTPATIENT
Start: 2022-09-28 | End: 2022-10-15 | Stop reason: HOSPADM

## 2022-09-28 RX ORDER — ONDANSETRON 2 MG/ML
4 INJECTION INTRAMUSCULAR; INTRAVENOUS EVERY 6 HOURS PRN
Status: DISCONTINUED | OUTPATIENT
Start: 2022-09-28 | End: 2022-10-10 | Stop reason: SDUPTHER

## 2022-09-28 RX ORDER — HYDROXYZINE HYDROCHLORIDE 25 MG/1
50 TABLET, FILM COATED ORAL 3 TIMES DAILY PRN
Status: DISCONTINUED | OUTPATIENT
Start: 2022-09-28 | End: 2022-10-15 | Stop reason: HOSPADM

## 2022-09-28 RX ORDER — TRAZODONE HYDROCHLORIDE 50 MG/1
50 TABLET ORAL NIGHTLY PRN
Status: DISCONTINUED | OUTPATIENT
Start: 2022-09-28 | End: 2022-10-15 | Stop reason: HOSPADM

## 2022-09-28 RX ORDER — MORPHINE SULFATE 2 MG/ML
2 INJECTION, SOLUTION INTRAMUSCULAR; INTRAVENOUS EVERY 4 HOURS PRN
Status: DISCONTINUED | OUTPATIENT
Start: 2022-09-28 | End: 2022-10-13

## 2022-09-28 RX ORDER — FOLIC ACID 1 MG/1
1000 TABLET ORAL DAILY
Status: DISCONTINUED | OUTPATIENT
Start: 2022-09-28 | End: 2022-10-15 | Stop reason: HOSPADM

## 2022-09-28 RX ORDER — SERTRALINE HYDROCHLORIDE 100 MG/1
100 TABLET, FILM COATED ORAL DAILY
Status: DISCONTINUED | OUTPATIENT
Start: 2022-09-28 | End: 2022-10-15 | Stop reason: HOSPADM

## 2022-09-28 RX ORDER — SODIUM CHLORIDE 9 MG/ML
INJECTION, SOLUTION INTRAVENOUS PRN
Status: DISCONTINUED | OUTPATIENT
Start: 2022-09-28 | End: 2022-10-15 | Stop reason: HOSPADM

## 2022-09-28 RX ADMIN — HYDROXYZINE HYDROCHLORIDE 50 MG: 25 TABLET, FILM COATED ORAL at 21:36

## 2022-09-28 RX ADMIN — MORPHINE SULFATE 2 MG: 2 INJECTION, SOLUTION INTRAMUSCULAR; INTRAVENOUS at 16:35

## 2022-09-28 RX ADMIN — Medication 1 TABLET: at 21:37

## 2022-09-28 RX ADMIN — ISOSORBIDE MONONITRATE 120 MG: 60 TABLET, EXTENDED RELEASE ORAL at 21:38

## 2022-09-28 RX ADMIN — ATORVASTATIN CALCIUM 40 MG: 40 TABLET, FILM COATED ORAL at 21:36

## 2022-09-28 RX ADMIN — FLUTICASONE PROPIONATE 1 SPRAY: 50 SPRAY, METERED NASAL at 21:35

## 2022-09-28 RX ADMIN — HYDROCODONE BITARTRATE AND ACETAMINOPHEN 2 TABLET: 5; 325 TABLET ORAL at 18:44

## 2022-09-28 RX ADMIN — SERTRALINE 100 MG: 100 TABLET, FILM COATED ORAL at 21:36

## 2022-09-28 RX ADMIN — SODIUM CHLORIDE, PRESERVATIVE FREE 10 ML: 5 INJECTION INTRAVENOUS at 21:14

## 2022-09-28 RX ADMIN — VERAPAMIL HYDROCHLORIDE 240 MG: 240 TABLET, FILM COATED, EXTENDED RELEASE ORAL at 21:38

## 2022-09-28 RX ADMIN — MORPHINE SULFATE 2 MG: 2 INJECTION, SOLUTION INTRAMUSCULAR; INTRAVENOUS at 21:14

## 2022-09-28 RX ADMIN — FAMOTIDINE 20 MG: 20 TABLET ORAL at 21:36

## 2022-09-28 RX ADMIN — ASPIRIN 81 MG: 81 TABLET, COATED ORAL at 21:37

## 2022-09-28 RX ADMIN — HEPARIN SODIUM 5000 UNITS: 5000 INJECTION INTRAVENOUS; SUBCUTANEOUS at 21:35

## 2022-09-28 RX ADMIN — CLONIDINE HYDROCHLORIDE 0.3 MG: 0.2 TABLET ORAL at 21:37

## 2022-09-28 RX ADMIN — FOLIC ACID 1000 MCG: 1 TABLET ORAL at 21:36

## 2022-09-28 RX ADMIN — TRAZODONE HYDROCHLORIDE 50 MG: 50 TABLET ORAL at 21:36

## 2022-09-28 ASSESSMENT — PAIN DESCRIPTION - PAIN TYPE
TYPE: ACUTE PAIN

## 2022-09-28 ASSESSMENT — PAIN DESCRIPTION - DESCRIPTORS
DESCRIPTORS: ACHING;DISCOMFORT

## 2022-09-28 ASSESSMENT — PAIN DESCRIPTION - FREQUENCY
FREQUENCY: CONTINUOUS

## 2022-09-28 ASSESSMENT — PAIN DESCRIPTION - ORIENTATION
ORIENTATION: RIGHT

## 2022-09-28 ASSESSMENT — PAIN DESCRIPTION - ONSET
ONSET: ON-GOING

## 2022-09-28 ASSESSMENT — PAIN DESCRIPTION - LOCATION
LOCATION: RIB CAGE

## 2022-09-28 ASSESSMENT — PAIN SCALES - GENERAL
PAINLEVEL_OUTOF10: 5
PAINLEVEL_OUTOF10: 8
PAINLEVEL_OUTOF10: 8
PAINLEVEL_OUTOF10: 4
PAINLEVEL_OUTOF10: 8

## 2022-09-28 ASSESSMENT — PAIN - FUNCTIONAL ASSESSMENT
PAIN_FUNCTIONAL_ASSESSMENT: PREVENTS OR INTERFERES SOME ACTIVE ACTIVITIES AND ADLS

## 2022-09-28 NOTE — PLAN OF CARE
Spoke to primary about consult placed earlier. Consult was canceled as pulmonology service will not be needed at this time. Please call if you would like pulmonology to see patient in the future.     Electronically signed by Pam Montanez MD on 9/28/2022 at 5:24 PM

## 2022-09-28 NOTE — CONSULTS
[facility]    [address], [address]  9/28/2022 3:47 PM    Pt Name:    Esha Christina  MRN:     [id number]   [id number]  YOB: 1965  Admit Date:    9/28/2022  9:56 AM  Primary Care Physician:  Nilda Madrid MD        Reason for Consult:  ESRD    History:   The patient is a 56 y.o. male seen in nephrology consult for ESRD. He dialyzes TTS under the care of Dr. Marilyn Moreno via left arm AV fistula, additional hx includes DM, HTN, AAA, REZA, arthritis, and as below. He presented to the hospital with shortness of breath which has been ongoing for about the past 2-3 weeks. Associated with LE swelling and orthopnea. He has not missed any dialysis, looks like has not always been getting to his dry weight. He does have some intradialytic hypotension at times. O2 sat initially in ED was 67%. He was placed on norebreather and subsequently bipap. ABG showed pH 7.27, pCO2 64. CXR showed large right pleural effusion with associated compressive atelectasis or right lower lobe collapse and increase in groundglass opacities suggesting underlying pulmonary edema. Past Medical History:  Past Medical History:   Diagnosis Date    AAA (abdominal aortic aneurysm) ([address]     NURIS (acute kidney injury) ([address] 9/24/2015    Anemia associated with chronic renal failure     Arthritis     stated in hands    Cocaine abuse ([address] 5/10/2014    Depression     Diabetes mellitus ([address]     pt states he no longer has diabetes he has lost alot of davion.      FSGS (focal segmental glomerulosclerosis) 5/23/2013    Hemodialysis patient ([address] 10/17/2016    on hemodialysis with [facility] 1/16/2012    History of blood transfusion     Hyperlipidemia     Hyperphosphatemia 5/21/2016    Hypertension     Left renal artery stenosis ([address] 5/22/2014    Monoclonal (M) protein disease, multiple 'M' protein     Nicotine dependence 6/16/2014 Noncompliance     Pneumonia     Psychiatric problem     PTSD (post-traumatic stress disorder)     Pt vet from [place]    Secondary hyperparathyroidism (of renal origin)     Sleep apnea        Past Surgical History:  Past Surgical History:   Procedure Laterality Date    ABDOMINAL AORTIC ANEURYSM REPAIR      DIALYSIS FISTULA CREATION Left 07/08/2016    EKG 12-LEAD  9/24/2015         HAND TENDON SURGERY Left 4/5/2019    LEFT THUMB FLEXOR TENDON REPAIR performed by Earl Galdamez MD at [facility] Left 07/08/2016    AV Fistula    VASCULAR SURGERY Right 1990    Surgery on Achilles tendon       Family History:  Family History   Problem Relation Age of Onset    Cancer Mother     Other Brother         aneurysm        Social History:  Social History     Socioeconomic History    Marital status:      Spouse name: Andrea    Number of children: 2    Years of education: 12    Highest education level: Not on file   Occupational History     Employer: UNEMPLOYED   Tobacco Use    Smoking status: Some Days     Packs/day: 0.25     Years: 20.00     Pack years: 5.00     Types: Cigarettes     Start date: 10/11/1985    Smokeless tobacco: Never    Tobacco comments:     occasional   Vaping Use    Vaping Use: Never used   Substance and Sexual Activity    Alcohol use: No    Drug use: Not Currently     Comment: hx of    Sexual activity: Yes     Partners: Female   Other Topics Concern    Not on file   Social History Narrative    Not on file     Social Determinants of Health     Financial Resource Strain: Not on file   Food Insecurity: Not on file   Transportation Needs: Not on file   Physical Activity: Not on file   Stress: Not on file   Social Connections: Not on file   Intimate Partner Violence: Not on file   Housing Stability: Not on file       Home Meds:  Prior to Admission medications    Medication Sig Start Date End Date Taking?  Authorizing Provider   Sucroferric Oxyhydroxide (VELPHORO) 500 MG CHEW Take 500 mg by mouth 4 times daily (before meals and nightly) Take with meals and snacks    Historical Provider, MD   Multiple Vitamins-Minerals (THERAPEUTIC MULTIVITAMIN-MINERALS) tablet Take 1 tablet by mouth daily    Historical Provider, MD   folic acid (FOLVITE) 852 MCG tablet Take 800 mcg by mouth daily    Historical Provider, MD   vitamin E 400 UNIT capsule Take 400 Units by mouth daily    Historical Provider, MD   minoxidil (LONITEN) 10 MG tablet Take 2 tablets by mouth every 12 hours for 28 days  Patient taking differently: Take 10 mg by mouth 2 times daily Take 15mg every 12 hours 3/13/20 7/27/20  Randi Bourgeois MD   ferrous sulfate (FE TABS 325) 325 (65 Fe) MG EC tablet Take 1 tablet by mouth 3 times daily (with meals)  Patient taking differently: Take 325 mg by mouth daily (with breakfast)  3/13/20   Randi Bourgeois MD   nitroGLYCERIN (NITROSTAT) 0.4 MG SL tablet up to max of 3 total doses.  If no relief after 1 dose, call 911. 2/26/20   Randi Bourgeois MD   acetaminophen (TYLENOL) 325 MG tablet Take 650 mg by mouth every 4 hours as needed for Pain or Fever    Historical Provider, MD   B Complex-C-Zn-Folic Acid (DIALYVITE 699-KVRH 15 PO) Take 1 tablet by mouth daily    Historical Provider, MD   verapamil (CALAN SR) 240 MG extended release tablet Take 240 mg by mouth daily    Historical Provider, MD   vitamin D (CHOLECALCIFEROL) 25 MCG (1000 UT) TABS tablet Take 1,000 Units by mouth 2 times daily Take two tablets twice a day    Historical Provider, MD   cloNIDine (CATAPRES) 0.3 MG tablet Take 1 tablet by mouth 3 times daily 1/28/20 7/27/20  WADE Bobby CNP   doxazosin (CARDURA) 8 MG tablet Take 1 tablet by mouth every 12 hours for 28 days  Patient taking differently: Take 8 mg by mouth nightly  1/28/20 7/27/20  WADE Bobby - CNP   famotidine (PEPCID) 20 MG tablet Take 1 tablet by mouth daily 1/28/20   WADE Bobby - MEL   sertraline (ZOLOFT) 100 MG tablet Take 100 mg by mouth daily    Historical Provider, MD   hydrOXYzine (ATARAX) 50 MG tablet Take 50 mg by mouth 3 times daily as needed for Itching    Historical Provider, MD   aspirin 81 MG EC tablet Take 1 tablet by mouth daily 3/23/18   Madan Post, MD   isosorbide mononitrate (IMDUR) 120 MG extended release tablet Take 1 tablet by mouth daily 3/24/18   Madan Post, MD   traZODone (DESYREL) 50 MG tablet Take 1 tablet by mouth nightly as needed for Sleep 3/23/18   Madan Post, MD   atorvastatin (LIPITOR) 40 MG tablet Take 1 tablet by mouth nightly 3/23/18   Madan Post, MD   fluticasone (FLONASE) 50 MCG/ACT nasal spray 1 spray by Nasal route daily     Historical Provider, MD       Review of Systems:  Constitutional: no fever or chills  Head: No headaches  Eyes: no blurry vision, no discharge  Ears: no ear pain or hearing changes  Nose: no runny nose or epistaxis  Respiratory: +shortness of breath  Cardiovascular: no chest pain, + edema  GI: no nausea, no vomiting or diarrhea  : denies any hematuria,   Skin: no rash  Musculoskeletal: no joint pain, moves all ext  Neuro: no tremor, no slurred speech  Psychiatric: stable mood, no depression or insomnia    Current Meds:  Infusion:    sodium chloride       Meds:    aspirin  81 mg Oral Daily    atorvastatin  40 mg Oral Nightly    cloNIDine  0.3 mg Oral TID    doxazosin  8 mg Oral Nightly    famotidine  20 mg Oral Daily    [START ON 9/29/2022] ferrous sulfate  325 mg Oral Daily with breakfast    folic acid  1,970 mcg Oral Daily    fluticasone  1 spray Nasal Daily    isosorbide mononitrate  120 mg Oral Daily    sertraline  100 mg Oral Daily    verapamil  240 mg Oral Daily    multivitamin  1 tablet Oral Daily    sodium chloride flush  10 mL IntraVENous 2 times per day    heparin (porcine)  5,000 Units SubCUTAneous 3 times per day     Meds prn: hydrOXYzine HCl, traZODone, sodium chloride flush, sodium chloride, ondansetron **OR** ondansetron, polyethylene glycol, acetaminophen **OR** acetaminophen     Allergies/Intolerances: ALLERGIES: Humalog [insulin lispro], Insulin regular human, and Lisinopril    24HR INTAKE/OUTPUT:  No intake or output data in the 24 hours ending 09/28/22 1547  No intake/output data recorded. No intake/output data recorded. Admission weight: 250 lb (113.4 kg)  Wt Readings from Last 3 Encounters:   09/28/22 231 lb 9.6 oz (105.1 kg)   09/13/21 250 lb (113.4 kg)   07/26/21 240 lb (108.9 kg)     Body mass index is 28.95 kg/m². Physical Examination:  VITALS:  BP (!) 148/105   Pulse 82   Temp 97.9 °F (36.6 °C) (Oral)   Resp 14   Ht 6' 3\" (1.905 m)   Wt 231 lb 9.6 oz (105.1 kg)   SpO2 99%   BMI 28.95 kg/m²   Weight:   Wt Readings from Last 3 Encounters:   09/28/22 231 lb 9.6 oz (105.1 kg)   09/13/21 250 lb (113.4 kg)   07/26/21 240 lb (108.9 kg)     Constitutional and General Appearance: alert and cooperative with exam, appears comfortable, no distress  Eyes: no icteric sclera, no pallor conjunctiva, no discharge seen from either eye  Ears and Nose: normal external appearance of left and right ear and nose. No active drainage from nose. Oral: moist oral mucus membranes  Neck: No jugular venous distention, appears symmetric, good ROM  Lungs: diminished breath sounds  Heart: regular rate, S1, S2  Extremities: 2+ LE edema, + left arm AV fistula aneurysmal  GI: soft, non-tender, no guarding  Skin: no rash seen on exposed extremities  Musculo: moves all extremities  Neuro: no slurred speech, no facial drooping, no asterixis  Psychiatric: Awake, alert, Oriented    Lab Data  CBC:   Recent Labs     09/28/22  1000   WBC 6.4   HGB 11.1*   HCT 36.3*   *     BMP:  Recent Labs     09/28/22  1000      K 4.6      CO2 27   BUN 42*   CREATININE 7.6*   GLUCOSE 131*   CALCIUM 8.5     PTH: @PTH@  TSH: No results for input(s): TSH in the last 72 hours.   HgBa1c: No results for input(s): LABA1C in the last 72 hours. Hepatic:   Recent Labs     09/28/22  1000   LABALBU 3.8   AST 12   ALT 11   BILITOT 0.3   ALKPHOS 95     ABGs: No results found for: PHART, PO2ART, BPP9DDG  Troponin: No results for input(s): TROPONINI in the last 72 hours. BNP: No results for input(s): BNP in the last 72 hours. Old labs reviewed. Impression and Plan:  ESRD on hemodialysis TTS  -patient presenting with pulmonary, volume overload  -will dialyze patient today and again tomorrow    2. Acute hypoxic/hypercapneic resp failure  3. Pulmonary edema  4. Right pleural effusion  5. DM  6. Anemia in CKD  7. secondary hyperparathyroidism    Thank you for allowing me to participate in the care of this patient. Please feel free to call me if you have any questions.      Electronically signed by [id number] Lily Santos DO on 9/28/22 at 3:47 PM EDT    [facility]

## 2022-09-28 NOTE — ED PROVIDER NOTES
[facility] ICU STEPDOWN TELEMETRY 4K  [address] [facility] [address]  Phone: [address]       Chief Complaint   Patient presents with    Shortness of Breath       Nurses Notes reviewed and I agree except as noted in the HPI. HISTORY OF PRESENT ILLNESS    Laurie Burkett is a 56 y.o. male. Patient presents with acute dyspnea. He states he does not normally have dyspnea but something like this has happened to him before. He does have end-stage renal disease and states that he went to dialysis yesterday. He has not missed any dialysis. He also takes medicine for hypertension issues. REVIEW OF SYSTEMS         No fever, no coughing, no abdominal pain    Remainder of review of systems is otherwise reviewed as negative. PAST MEDICAL HISTORY    has a past medical history of AAA (abdominal aortic aneurysm) ([address] NURIS (acute kidney injury) ([address] Anemia associated with chronic renal failure, Arthritis, Cocaine abuse ([address] Depression, Diabetes mellitus ([address] FSGS (focal segmental glomerulosclerosis), Hemodialysis patient ([address] Hemorrhoids, History of blood transfusion, Hyperlipidemia, Hyperphosphatemia, Hypertension, Left renal artery stenosis ([address] Monoclonal (M) protein disease, multiple 'M' protein, Nicotine dependence, Noncompliance, Pneumonia, Psychiatric problem, PTSD (post-traumatic stress disorder), Secondary hyperparathyroidism (of renal origin), and Sleep apnea. SURGICAL HISTORY      has a past surgical history that includes Leg Tendon Surgery; EKG 12 Lead (9/24/2015); Dialysis fistula creation (Left, 07/08/2016); vascular surgery (Left, 07/08/2016); vascular surgery (Right, 1990); Hand tendon surgery (Left, 4/5/2019); and Abdominal aortic aneurysm repair.     CURRENT MEDICATIONS       Current Discharge Medication List        CONTINUE these medications which have NOT CHANGED    Details   Sucroferric Oxyhydroxide (VELPHORO) 500 MG CHEW Take 500 mg by mouth 4 times daily (before meals and nightly) Take with meals and snacks      Multiple Vitamins-Minerals (THERAPEUTIC MULTIVITAMIN-MINERALS) tablet Take 1 tablet by mouth daily      folic acid (FOLVITE) 905 MCG tablet Take 800 mcg by mouth daily      vitamin E 400 UNIT capsule Take 400 Units by mouth daily      minoxidil (LONITEN) 10 MG tablet Take 2 tablets by mouth every 12 hours for 28 days  Qty: 21 tablet, Refills: 1      ferrous sulfate (FE TABS 325) 325 (65 Fe) MG EC tablet Take 1 tablet by mouth 3 times daily (with meals)  Qty: 270 tablet, Refills: 3      nitroGLYCERIN (NITROSTAT) 0.4 MG SL tablet up to max of 3 total doses. If no relief after 1 dose, call 911.   Qty: 25 tablet, Refills: 3      acetaminophen (TYLENOL) 325 MG tablet Take 650 mg by mouth every 4 hours as needed for Pain or Fever      B Complex-C-Zn-Folic Acid (DIALYVITE 229-RECZ 15 PO) Take 1 tablet by mouth daily      verapamil (CALAN SR) 240 MG extended release tablet Take 240 mg by mouth daily      vitamin D (CHOLECALCIFEROL) 25 MCG (1000 UT) TABS tablet Take 1,000 Units by mouth 2 times daily Take two tablets twice a day      cloNIDine (CATAPRES) 0.3 MG tablet Take 1 tablet by mouth 3 times daily  Qty: 30 tablet, Refills: 3      doxazosin (CARDURA) 8 MG tablet Take 1 tablet by mouth every 12 hours for 28 days  Qty: 14 tablet, Refills: 3      famotidine (PEPCID) 20 MG tablet Take 1 tablet by mouth daily  Qty: 60 tablet, Refills: 0      sertraline (ZOLOFT) 100 MG tablet Take 100 mg by mouth daily      hydrOXYzine (ATARAX) 50 MG tablet Take 50 mg by mouth 3 times daily as needed for Itching      aspirin 81 MG EC tablet Take 1 tablet by mouth daily  Qty: 7 tablet, Refills: 0      isosorbide mononitrate (IMDUR) 120 MG extended release tablet Take 1 tablet by mouth daily  Qty: 7 tablet, Refills: 0      traZODone (DESYREL) 50 MG tablet Take 1 tablet by mouth nightly as needed for Sleep  Qty: 7 tablet, Refills: 0      atorvastatin (LIPITOR) 40 MG tablet Take 1 tablet by mouth nightly  Qty: 7 tablet, Refills: 0      fluticasone (FLONASE) 50 MCG/ACT nasal spray 1 spray by Nasal route daily              ALLERGIES     is allergic to humalog [insulin lispro], insulin regular human, and lisinopril. FAMILY HISTORY     He indicated that his mother is . He indicated that his father is . He indicated that his brother is . family history includes Cancer in his mother; Other in his brother. SOCIAL HISTORY      reports that he has been smoking cigarettes. He started smoking about 36 years ago. He has a 5.00 pack-year smoking history. He has never used smokeless tobacco. He reports that he does not currently use drugs. He reports that he does not drink alcohol. PHYSICAL EXAM     INITIAL VITALS:  height is 6' 3\" (1.905 m) and weight is 231 lb 9.6 oz (105.1 kg). His oral temperature is 97.9 °F (36.6 °C). His blood pressure is 148/105 (abnormal) and his pulse is 82. His respiration is 13 and oxygen saturation is 99%. Constitutional: nontoxic   Eyes:  Pupils are equal and reactive  HENT:  Atraumatic appearing  oropharynx moist, no pharyngeal exudates. Neck- normal range of motion, supple   Respiratory:  No wheezing, rhonchi or rales  Cardiovascular: regular  GI:  Non tender, no rigidity, rebound or guarding  :  No costovertebral angle tenderness   Musculoskeletal:  2/4 distal pulses, +3 pitting edema  Integument: warm and dry   Neurologic:  Alert & oriented x 3. Psychiatric:  Speech and behavior appropriate        DIAGNOSTIC RESULTS     EKG: All EKG's are interpreted by the Emergency Department Physician who either signs or Co-signs this chart in the absence of a cardiologist.  EKG interpreted by me showing sinus rhythm at a rate of 94, QRS of 102, QTc of 4-82, axis -144 with repolarization abnormality. Possible old lateral and inferior infarcts.     RADIOLOGY: non-plain film images(s) such as CT, Ultrasound and MRI are read by the radiologist.  Chest x-ray interpreted by the radiologist showing a large right pleural effusion and edema    LABS:   Labs Reviewed   BLOOD GAS, ARTERIAL - Abnormal; Notable for the following components:       Result Value    pH, Blood Gas 7.31 (*)     PCO2 57 (*)     PO2 190 (*)     HCO3 29 (*)     All other components within normal limits   CBC WITH AUTO DIFFERENTIAL - Abnormal; Notable for the following components:    RBC 3.52 (*)     Hemoglobin 11.1 (*)     Hematocrit 36.3 (*)     .1 (*)     MCHC 30.6 (*)     RDW-SD 49.9 (*)     Platelets 565 (*)     Lymphocytes Absolute 0.7 (*)     All other components within normal limits   COMPREHENSIVE METABOLIC PANEL W/ REFLEX TO MG FOR LOW K - Abnormal; Notable for the following components:    Glucose 131 (*)     Creatinine 7.6 (*)     BUN 42 (*)     All other components within normal limits   TROPONIN - Abnormal; Notable for the following components:    Troponin T 0.054 (*)     All other components within normal limits   BRAIN NATRIURETIC PEPTIDE - Abnormal; Notable for the following components:    Pro-BNP 84376.0 (*)     All other components within normal limits   GLOMERULAR FILTRATION RATE, ESTIMATED - Abnormal; Notable for the following components:    Est, Glom Filt Rate 9 (*)     All other components within normal limits   BLOOD GAS, ARTERIAL - Abnormal; Notable for the following components:    pH, Blood Gas 7.27 (*)     PCO2 64 (*)     HCO3 29 (*)     All other components within normal limits   COVID-19, RAPID   RAPID INFLUENZA A/B ANTIGENS   CULTURE, BLOOD 1   LACTIC ACID   ANION GAP   OSMOLALITY       EMERGENCY DEPARTMENT COURSE:   Vitals:    Vitals:    09/28/22 1249 09/28/22 1304 09/28/22 1444 09/28/22 1546   BP: 99/61 107/61 (!) 148/105    Pulse: 79  82    Resp: 14   13   Temp:   97.9 °F (36.6 °C)    TempSrc:   Oral    SpO2: 99%  99%    Weight:   231 lb 9.6 oz (105.1 kg)    Height:   6' 3\" (1.905 m)      Patient is ill-appearing and hypoxemic on arrival.  The blood gas shows hypercarbic respiratory failure. We put him on BiPAP with some improvement. We are admitting to the hospitalist service. CRITICAL CARE:   CRITICAL CARE: There was a high probability of clinically significant/life threatening deterioration in this patient's condition which required my urgent intervention. Total critical care time was 30 minutes. This excludes any time for separately reportable procedures. FINAL IMPRESSION      1. Acute respiratory failure with hypoxia and hypercarbia (HCC)    2.  ESRD (end stage renal disease) [facility])          DISPOSITION/PLAN   Admitted    DISCHARGE MEDICATIONS:  Current Discharge Medication List          (Please note that portions of this note were completed with a voice recognition program.  Efforts were made to edit the dictations but occasionally words are mis-transcribed.)    Cadence Nair, [address] [place] [address] Tom,   09/28/22 7518

## 2022-09-28 NOTE — ED NOTES
Pt appears to be resting comfortably. Pt remains on bipap. Call light in reach. Pt denies any needs at this time.       Kimberlee Patino RN  09/28/22 2395

## 2022-09-28 NOTE — PROGRESS NOTES
Pt admitted to  [id number] in a wheelchair. Complaints: None. IV none infusing into the antecubital right, condition patent and no redness. IV site free of s/s of infection or infiltration. Vital signs obtained. Assessment and data collection initiated. Two nurse skin assessment performed by Lisa Quintana RN and Francisca Kim RN. Oriented to room. Policies and procedures for 4K explained. All questions answered with no further questions at this time. Fall prevention and safety brochure discussed with patient. Bed alarm on. Call light in reach. The best day to schedule a follow up Dr appointment is:  Monday, Wednesday, and Friday a.m. due to dialysis on Tuesday, Thursday, Saturday.

## 2022-09-28 NOTE — H&P
Hospitalist History & Physical    Patient:  Selvin Sanchez    Unit/Bed:10/010A  YOB: 1965  MRN: [id number]   Acct: [de-identified]   PCP: Caroline Pappas MD  Code Status: Prior    Date of Service: Pt seen/examined on 09/28/22 and admitted to inpatient with expected LOS greater than two midnights due to medical therapy. Chief Complaint: SOB    Assessment/Plan:    Acute hypoxic/hypercapneic respiratory failure: secondary to pleural effusion. Management as stated below. Pulm consulted. Wean O2 as tolerated. Large right sided pleural effusion: likely secondary to ESRD, nephrology consulted for dialysis. Will consult Pulmonary for further assistance with thoracentesis vs if chest tube is needed. Will keep NPO until further evaluation by Pulm. Respiratory acidosis: pH 7.31, PCO2 57, HCO3 29, pO2 190. Pt was started on BiPAP in ED. Will repeat ABG and change BiPAP to naps and with sleep if improvement. 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 History of polysubstance abuse: H/o cocaine and alcohol abuse, no longer using. History of Present Illness:  Sevlin Sanchez is a 56 y.o. male with PMHx of AAA, ESRD on HD, DMII, hyperparathyroidism, who presented to [facility] with chief complaint of SOB. Patient reports SOB ongoing for the past month. Patient reports orthopnea, as well as dyspnea on exertion. Associated with lower extremity edema, also present for the past month. Feels his abdomen may be slightly distended from baseline. He states he has been compliant with dialysis and his medications. Denies cough, chest pain, fever/chills, unintentional weight loss, decreased po intake, night sweats. Patient hypoxic on arrival, found to have large right pleural effusion. Admitted to stepdown for further management. Review of Systems: Pertinent positives as noted in the HPI. All other systems reviewed and negative.     Past Medical History:        Diagnosis Date    AAA (abdominal aortic aneurysm) (HCC)     NURIS (acute kidney injury) ([facility] [address] 9/24/2015    Anemia associated with chronic renal failure     Arthritis     stated in hands    Cocaine abuse ([facility] [address] 5/10/2014    Depression     Diabetes mellitus ([facility] [address]     pt states he no longer has diabetes he has lost alot of davion.      FSGS (focal segmental glomerulosclerosis) 5/23/2013    Hemodialysis patient ([facility] [address] 10/17/2016    on hemodialysis with [facility] 1/16/2012    History of blood transfusion     Hyperlipidemia     Hyperphosphatemia 5/21/2016    Hypertension     Left renal artery stenosis ([facility] [address] 5/22/2014    Monoclonal (M) protein disease, multiple 'M' protein     Nicotine dependence 6/16/2014    Noncompliance     Pneumonia     Psychiatric problem     PTSD (post-traumatic stress disorder)     Pt vet from [place]    Secondary hyperparathyroidism (of renal origin)     Sleep apnea        Past Surgical History:        Procedure Laterality Date    ABDOMINAL AORTIC ANEURYSM REPAIR      DIALYSIS FISTULA CREATION Left 07/08/2016    EKG 12-LEAD  9/24/2015         HAND TENDON SURGERY Left 4/5/2019    LEFT THUMB FLEXOR TENDON REPAIR performed by Elba George MD at [facility] Left 07/08/2016    AV Fistula    VASCULAR SURGERY Right 1990    Surgery on Achilles tendon       Home Medications:   Current Facility-Administered Medications on File Prior to Encounter   Medication Dose Route Frequency Provider Last Rate Last Admin    HYDROmorphone (DILAUDID) injection 1 mg  1 mg IntraVENous Once Tre Griggs MD        ioversol (OPTIRAY) 51 % injection 100 mL  100 mL IntraVENous ONCE PRN Tre Griggs MD        lidocaine (LMX) 4 % cream   Topical Once Tre Griggs MD        midazolam (VERSED) injection 1 mg  1 mg IntraVENous Once Tre Griggs MD         Current Outpatient Medications on File Prior to Encounter   Medication Sig Dispense Refill    Sucroferric Oxyhydroxide (VELPHORO) 500 MG CHEW Take 500 mg by mouth 4 times daily (before meals and nightly) Take with meals and snacks      Multiple Vitamins-Minerals (THERAPEUTIC MULTIVITAMIN-MINERALS) tablet Take 1 tablet by mouth daily      folic acid (FOLVITE) 006 MCG tablet Take 800 mcg by mouth daily      vitamin E 400 UNIT capsule Take 400 Units by mouth daily      minoxidil (LONITEN) 10 MG tablet Take 2 tablets by mouth every 12 hours for 28 days (Patient taking differently: Take 10 mg by mouth 2 times daily Take 15mg every 12 hours) 21 tablet 1    ferrous sulfate (FE TABS 325) 325 (65 Fe) MG EC tablet Take 1 tablet by mouth 3 times daily (with meals) (Patient taking differently: Take 325 mg by mouth daily (with breakfast) ) 270 tablet 3    nitroGLYCERIN (NITROSTAT) 0.4 MG SL tablet up to max of 3 total doses.  If no relief after 1 dose, call 911. 25 tablet 3    acetaminophen (TYLENOL) 325 MG tablet Take 650 mg by mouth every 4 hours as needed for Pain or Fever      B Complex-C-Zn-Folic Acid (DIALYVITE 074-OVUS 15 PO) Take 1 tablet by mouth daily      verapamil (CALAN SR) 240 MG extended release tablet Take 240 mg by mouth daily      vitamin D (CHOLECALCIFEROL) 25 MCG (1000 UT) TABS tablet Take 1,000 Units by mouth 2 times daily Take two tablets twice a day      cloNIDine (CATAPRES) 0.3 MG tablet Take 1 tablet by mouth 3 times daily 30 tablet 3    doxazosin (CARDURA) 8 MG tablet Take 1 tablet by mouth every 12 hours for 28 days (Patient taking differently: Take 8 mg by mouth nightly ) 14 tablet 3    famotidine (PEPCID) 20 MG tablet Take 1 tablet by mouth daily 60 tablet 0    sertraline (ZOLOFT) 100 MG tablet Take 100 mg by mouth daily      hydrOXYzine (ATARAX) 50 MG tablet Take 50 mg by mouth 3 times daily as needed for Itching      aspirin 81 MG EC tablet Take 1 tablet by mouth daily 7 tablet 0    isosorbide mononitrate (IMDUR) 120 MG extended release tablet Take 1 tablet by mouth daily 7 tablet 0    traZODone (DESYREL) 50 MG tablet Take 1 tablet by mouth nightly as needed for Sleep 7 tablet 0    atorvastatin (LIPITOR) 40 MG tablet Take 1 tablet by mouth nightly 7 tablet 0    fluticasone (FLONASE) 50 MCG/ACT nasal spray 1 spray by Nasal route daily          Allergies:    Humalog [insulin lispro], Insulin regular human, and Lisinopril    Social History:    reports that he has been smoking cigarettes. He started smoking about 36 years ago. He has a 5.00 pack-year smoking history. He has never used smokeless tobacco. He reports that he does not currently use drugs. He reports that he does not drink alcohol. Family History:       Problem Relation Age of Onset    Cancer Mother     Other Brother         aneurysm        Diet:  No diet orders on file      Physical Exam:  BP 99/61   Pulse 79   Temp 98 °F (36.7 °C) (Oral)   Resp 14   Wt 250 lb (113.4 kg)   SpO2 99%   BMI 31.25 kg/m²   General:   Pleasant, chronically ill appearing male. NAD  HEENT:  normocephalic and atraumatic. No scleral icterus. PERR. Neck: supple. No JVD. No thyromegaly. Lungs: clear to auscultation, mild rales right side. No retractions  Cardiac: RRR, +murmur. Abdomen: soft. Nontender. Bowel sounds positive. Extremities:  No clubbing, cyanosis. 1+ BLE edema. Vasculature: capillary refill < 3 seconds. Palpable LE pulses bilaterally. Skin:  warm and dry. No rashes or lesions. Psych:  Alert and oriented x3. Affect appropriate  Lymph:  No supraclavicular adenopathy. Neurologic:  No focal deficit. No seizures. Data: (All radiographs, tracings, PFTs, and imaging are personally viewed and interpreted unless otherwise noted)  Labs:   Recent Labs     09/28/22  1000   WBC 6.4   HGB 11.1*   HCT 36.3*   *     Recent Labs     09/28/22  1000      K 4.6      CO2 27   BUN 42*   CREATININE 7.6*   CALCIUM 8.5     Recent Labs     09/28/22  1000   AST 12   ALT 11   BILITOT 0.3   ALKPHOS 95     No results for input(s): INR in the last 72 hours.   No results for input(s): Dora Persaud in the last 72 hours. Urinalysis:   Lab Results   Component Value Date/Time    NITRU NEGATIVE 04/03/2018 07:45 PM    WBCUA 2-4 04/03/2018 07:45 PM    BACTERIA NONE 04/03/2018 07:45 PM    RBCUA 5-10 04/03/2018 07:45 PM    BLOODU SMALL 04/03/2018 07:45 PM    SPECGRAV 1.014 03/07/2018 09:10 PM    GLUCOSEU NEGATIVE 04/03/2018 07:45 PM         Radiology:  XR CHEST PORTABLE   Final Result   1. Large right pleural effusion with associated compressive atelectasis or right lower lobe collapse. 2. Mild increase groundglass opacities in the left lung are nonspecific but can suggest underlying pulmonary edema or pneumonia. 3. Other findings as described above. **This report has been created using voice recognition software. It may contain minor errors which are inherent in voice recognition technology. **      Final report electronically signed by Dr Loulou Hairston on 9/28/2022 10:32 AM        XR CHEST PORTABLE    Result Date: 9/28/2022  PROCEDURE: XR CHEST PORTABLE CLINICAL INFORMATION: Chest pain COMPARISON: Chest radiograph 12/18/2021 TECHNIQUE: AP portable chest radiograph performed. FINDINGS: A large right pleural effusion is seen. Associated compressive atelectasis or collapse of the right lower lobe. Mild increase groundglass opacities seen in the left lung. Stent graft is seen in the thoracic aorta. Cardiac silhouette is not enlarged. No pneumothorax. No acute bony abnormality. 1. Large right pleural effusion with associated compressive atelectasis or right lower lobe collapse. 2. Mild increase groundglass opacities in the left lung are nonspecific but can suggest underlying pulmonary edema or pneumonia. 3. Other findings as described above. **This report has been created using voice recognition software. It may contain minor errors which are inherent in voice recognition technology. ** Final report electronically signed by Dr Loulou Hairston on 9/28/2022 10:32 AM        Tele:   [x] yes             [] no      Thank you Delmy Ramon MD for the opportunity to be involved in this patient's care.     Electronically signed by Cesar Abdul PA-C on 9/28/2022 at 1:12 PM

## 2022-09-28 NOTE — ED NOTES
ED to inpatient nurses report    Chief Complaint   Patient presents with    Shortness of Breath      Present to ED from home  LOC: alert and orientated to name, place, date  Vital signs   Vitals:    09/28/22 1134 09/28/22 1204 09/28/22 1249 09/28/22 1304   BP: (!) 95/47  99/61 107/61   Pulse: 85  79    Resp: 14 21 14    Temp:       TempSrc:       SpO2: 100%  99%    Weight:          Oxygen Baseline 98%    Current needs required  Bipap/Cpap Yes  LDAs:   Peripheral IV 09/28/22 Right Antecubital (Active)   Site Assessment Clean, dry & intact 09/28/22 1132   Line Status Capped 09/28/22 1132   Phlebitis Assessment No symptoms 09/28/22 1132   Infiltration Assessment 0 09/28/22 1132   Dressing Status Clean, dry & intact 09/28/22 1132     Mobility: Requires assistance * 1  Pending ED orders: NA  Present condition: stable    C-SSRS Risk of Suicide: No Risk  Swallow Screening    Preferred Language: [place]     Electronically signed by Deep Jimenez RN on 9/28/2022 at 2:16 PM       Deep Jimenez RN  09/28/22 [id number]

## 2022-09-28 NOTE — ED NOTES
Patient presents to the ED with concerns of SOB. Patient unable to complete sentences without rapid and labored breathing and extreme SOB. Patient states he has been experiencing worsening SOB over the last two weeks. He completed his dialysis treatment yesterday. (schedule is T, TH, Saturdays). He denies any pain at this time. EKG in progress.       Rolande Nyhan Dwenger, RN  09/28/22 [id number]

## 2022-09-28 NOTE — FLOWSHEET NOTE
Dr Leonidas Kirkpatrick in room preparing for insertion of right sided chest tube. 1628 Chest tube insertion completed. Pt tolerated well. Specimen collected and sent to lab.

## 2022-09-28 NOTE — ED NOTES
Pt transported to [facility] on cart in stable condition. Floor contacted before transport and spoke with KADEN Lance.      Marianna Boggs  09/28/22 4745

## 2022-09-29 LAB
ALBUMIN SERPL-MCNC: 3.1 G/DL (ref 3.5–5.1)
ALP BLD-CCNC: 86 U/L (ref 38–126)
ALT SERPL-CCNC: 8 U/L (ref 11–66)
ANION GAP SERPL CALCULATED.3IONS-SCNC: 10 MEQ/L (ref 8–16)
AST SERPL-CCNC: 9 U/L (ref 5–40)
BILIRUB SERPL-MCNC: 0.3 MG/DL (ref 0.3–1.2)
BODY FLUID RBC: NORMAL /CUMM
BUN BLDV-MCNC: 28 MG/DL (ref 7–22)
CALCIUM SERPL-MCNC: 8.6 MG/DL (ref 8.5–10.5)
CHARACTER, BODY FLUID: NORMAL
CHLORIDE BLD-SCNC: 101 MEQ/L (ref 98–111)
CO2: 26 MEQ/L (ref 23–33)
COLOR: NORMAL
CREAT SERPL-MCNC: 6.6 MG/DL (ref 0.4–1.2)
ERYTHROCYTE [DISTWIDTH] IN BLOOD BY AUTOMATED COUNT: 13.2 % (ref 11.5–14.5)
ERYTHROCYTE [DISTWIDTH] IN BLOOD BY AUTOMATED COUNT: 51.9 FL (ref 35–45)
GFR SERPL CREATININE-BSD FRML MDRD: 11 ML/MIN/1.73M2
GLUCOSE BLD-MCNC: 109 MG/DL (ref 70–108)
GLUCOSE BLD-MCNC: 114 MG/DL (ref 70–108)
GLUCOSE BLD-MCNC: 126 MG/DL (ref 70–108)
HCT VFR BLD CALC: 35.7 % (ref 42–52)
HEMOGLOBIN: 10.4 GM/DL (ref 14–18)
LD: 179 U/L (ref 100–190)
LV EF: 60 %
LVEF MODALITY: NORMAL
MAGNESIUM: 2.3 MG/DL (ref 1.6–2.4)
MCH RBC QN AUTO: 31.5 PG (ref 26–33)
MCHC RBC AUTO-ENTMCNC: 29.1 GM/DL (ref 32.2–35.5)
MCV RBC AUTO: 108.2 FL (ref 80–94)
MONONUCLEAR CELLS BODY FLUID: 55 %
PATHOLOGIST REVIEW: NORMAL
PHOSPHORUS: 4.7 MG/DL (ref 2.4–4.7)
PLATELET # BLD: 123 THOU/MM3 (ref 130–400)
PMV BLD AUTO: 10.2 FL (ref 9.4–12.4)
POLYMORPHONUCLEAR CELLS BODY FLUID: 45 %
POTASSIUM REFLEX MAGNESIUM: 5.2 MEQ/L (ref 3.5–5.2)
RBC # BLD: 3.3 MILL/MM3 (ref 4.7–6.1)
SODIUM BLD-SCNC: 137 MEQ/L (ref 135–145)
SPECIMEN: NORMAL
TOTAL NUCLEATED CELLS BODY FLUID: 180 /CUMM (ref 0–500)
TOTAL PROTEIN: 6.5 G/DL (ref 6.1–8)
TOTAL PROTEIN: 7.3 G/DL (ref 6.1–8)
TOTAL VOLUME RECEIVED BODY FLUID: 20 ML
WBC # BLD: 5.9 THOU/MM3 (ref 4.8–10.8)

## 2022-09-29 PROCEDURE — 87102 FUNGUS ISOLATION CULTURE: CPT

## 2022-09-29 PROCEDURE — 88184 FLOWCYTOMETRY/ TC 1 MARKER: CPT

## 2022-09-29 PROCEDURE — 93306 TTE W/DOPPLER COMPLETE: CPT

## 2022-09-29 PROCEDURE — 6360000002 HC RX W HCPCS: Performed by: PHYSICIAN ASSISTANT

## 2022-09-29 PROCEDURE — 2700000000 HC OXYGEN THERAPY PER DAY

## 2022-09-29 PROCEDURE — 82607 VITAMIN B-12: CPT

## 2022-09-29 PROCEDURE — 99233 SBSQ HOSP IP/OBS HIGH 50: CPT | Performed by: INTERNAL MEDICINE

## 2022-09-29 PROCEDURE — 88305 TISSUE EXAM BY PATHOLOGIST: CPT

## 2022-09-29 PROCEDURE — 88185 FLOWCYTOMETRY/TC ADD-ON: CPT

## 2022-09-29 PROCEDURE — 84100 ASSAY OF PHOSPHORUS: CPT

## 2022-09-29 PROCEDURE — 6360000002 HC RX W HCPCS: Performed by: INTERNAL MEDICINE

## 2022-09-29 PROCEDURE — 6370000000 HC RX 637 (ALT 250 FOR IP): Performed by: INTERNAL MEDICINE

## 2022-09-29 PROCEDURE — 99232 SBSQ HOSP IP/OBS MODERATE 35: CPT | Performed by: INTERNAL MEDICINE

## 2022-09-29 PROCEDURE — 94761 N-INVAS EAR/PLS OXIMETRY MLT: CPT

## 2022-09-29 PROCEDURE — 6370000000 HC RX 637 (ALT 250 FOR IP): Performed by: PHYSICIAN ASSISTANT

## 2022-09-29 PROCEDURE — 36415 COLL VENOUS BLD VENIPUNCTURE: CPT

## 2022-09-29 PROCEDURE — 2580000003 HC RX 258: Performed by: PHYSICIAN ASSISTANT

## 2022-09-29 PROCEDURE — P9047 ALBUMIN (HUMAN), 25%, 50ML: HCPCS | Performed by: INTERNAL MEDICINE

## 2022-09-29 PROCEDURE — 83735 ASSAY OF MAGNESIUM: CPT

## 2022-09-29 PROCEDURE — 88112 CYTOPATH CELL ENHANCE TECH: CPT

## 2022-09-29 PROCEDURE — 80053 COMPREHEN METABOLIC PANEL: CPT

## 2022-09-29 PROCEDURE — 94660 CPAP INITIATION&MGMT: CPT

## 2022-09-29 PROCEDURE — 87116 MYCOBACTERIA CULTURE: CPT

## 2022-09-29 PROCEDURE — 87205 SMEAR GRAM STAIN: CPT

## 2022-09-29 PROCEDURE — 82948 REAGENT STRIP/BLOOD GLUCOSE: CPT

## 2022-09-29 PROCEDURE — 82746 ASSAY OF FOLIC ACID SERUM: CPT

## 2022-09-29 PROCEDURE — 90935 HEMODIALYSIS ONE EVALUATION: CPT

## 2022-09-29 PROCEDURE — 2060000000 HC ICU INTERMEDIATE R&B

## 2022-09-29 PROCEDURE — 85027 COMPLETE CBC AUTOMATED: CPT

## 2022-09-29 RX ORDER — SEVELAMER CARBONATE 800 MG/1
1600 TABLET, FILM COATED ORAL
COMMUNITY

## 2022-09-29 RX ORDER — ALBUMIN (HUMAN) 12.5 G/50ML
25 SOLUTION INTRAVENOUS ONCE
Status: COMPLETED | OUTPATIENT
Start: 2022-09-29 | End: 2022-09-29

## 2022-09-29 RX ORDER — ATORVASTATIN CALCIUM 80 MG/1
TABLET, FILM COATED ORAL
COMMUNITY
Start: 2022-09-02

## 2022-09-29 RX ADMIN — FLUTICASONE PROPIONATE 1 SPRAY: 50 SPRAY, METERED NASAL at 07:54

## 2022-09-29 RX ADMIN — ISOSORBIDE MONONITRATE 120 MG: 60 TABLET, EXTENDED RELEASE ORAL at 07:55

## 2022-09-29 RX ADMIN — CLONIDINE HYDROCHLORIDE 0.3 MG: 0.2 TABLET ORAL at 07:56

## 2022-09-29 RX ADMIN — MORPHINE SULFATE 2 MG: 2 INJECTION, SOLUTION INTRAMUSCULAR; INTRAVENOUS at 03:42

## 2022-09-29 RX ADMIN — ATORVASTATIN CALCIUM 40 MG: 40 TABLET, FILM COATED ORAL at 21:56

## 2022-09-29 RX ADMIN — HYDROCODONE BITARTRATE AND ACETAMINOPHEN 2 TABLET: 5; 325 TABLET ORAL at 00:39

## 2022-09-29 RX ADMIN — HYDROCODONE BITARTRATE AND ACETAMINOPHEN 2 TABLET: 5; 325 TABLET ORAL at 15:13

## 2022-09-29 RX ADMIN — SODIUM CHLORIDE, PRESERVATIVE FREE 10 ML: 5 INJECTION INTRAVENOUS at 07:54

## 2022-09-29 RX ADMIN — HEPARIN SODIUM 5000 UNITS: 5000 INJECTION INTRAVENOUS; SUBCUTANEOUS at 05:31

## 2022-09-29 RX ADMIN — SERTRALINE 100 MG: 100 TABLET, FILM COATED ORAL at 07:55

## 2022-09-29 RX ADMIN — HYDROCODONE BITARTRATE AND ACETAMINOPHEN 2 TABLET: 5; 325 TABLET ORAL at 07:54

## 2022-09-29 RX ADMIN — ASPIRIN 81 MG: 81 TABLET, COATED ORAL at 07:54

## 2022-09-29 RX ADMIN — HYDROCODONE BITARTRATE AND ACETAMINOPHEN 1 TABLET: 5; 325 TABLET ORAL at 21:56

## 2022-09-29 RX ADMIN — FOLIC ACID 1000 MCG: 1 TABLET ORAL at 07:55

## 2022-09-29 RX ADMIN — HEPARIN SODIUM 5000 UNITS: 5000 INJECTION INTRAVENOUS; SUBCUTANEOUS at 13:29

## 2022-09-29 RX ADMIN — Medication 1 TABLET: at 07:55

## 2022-09-29 RX ADMIN — HEPARIN SODIUM 5000 UNITS: 5000 INJECTION INTRAVENOUS; SUBCUTANEOUS at 21:56

## 2022-09-29 RX ADMIN — ALBUMIN (HUMAN) 25 G: 0.25 INJECTION, SOLUTION INTRAVENOUS at 11:12

## 2022-09-29 RX ADMIN — FAMOTIDINE 20 MG: 20 TABLET ORAL at 07:54

## 2022-09-29 RX ADMIN — FERROUS SULFATE TAB 325 MG (65 MG ELEMENTAL FE) 325 MG: 325 (65 FE) TAB at 07:56

## 2022-09-29 RX ADMIN — VERAPAMIL HYDROCHLORIDE 240 MG: 240 TABLET, FILM COATED, EXTENDED RELEASE ORAL at 07:56

## 2022-09-29 ASSESSMENT — PAIN DESCRIPTION - FREQUENCY
FREQUENCY: CONTINUOUS

## 2022-09-29 ASSESSMENT — PAIN SCALES - GENERAL
PAINLEVEL_OUTOF10: 3
PAINLEVEL_OUTOF10: 0
PAINLEVEL_OUTOF10: 0
PAINLEVEL_OUTOF10: 3
PAINLEVEL_OUTOF10: 0
PAINLEVEL_OUTOF10: 4
PAINLEVEL_OUTOF10: 8

## 2022-09-29 ASSESSMENT — PAIN DESCRIPTION - LOCATION
LOCATION: RIB CAGE

## 2022-09-29 ASSESSMENT — PAIN DESCRIPTION - DESCRIPTORS
DESCRIPTORS: ACHING
DESCRIPTORS: ACHING;DISCOMFORT

## 2022-09-29 ASSESSMENT — PAIN DESCRIPTION - PAIN TYPE
TYPE: ACUTE PAIN;SURGICAL PAIN
TYPE: ACUTE PAIN;SURGICAL PAIN
TYPE: SURGICAL PAIN
TYPE: SURGICAL PAIN
TYPE: ACUTE PAIN;SURGICAL PAIN

## 2022-09-29 ASSESSMENT — PAIN DESCRIPTION - ONSET
ONSET: ON-GOING

## 2022-09-29 ASSESSMENT — PAIN DESCRIPTION - ORIENTATION
ORIENTATION: RIGHT

## 2022-09-29 NOTE — PLAN OF CARE
Problem: Respiratory - Adult  Goal: Achieves optimal ventilation and oxygenation  9/29/2022 0459 by Jeremias Early RCP  Outcome: Progressing   Mutually agreed upon goal.

## 2022-09-29 NOTE — PROGRESS NOTES
[facility]   Nephrology progress note  9/29/2022, 8:29 AM      Pt Name:    Mera Land  MRN:     [id number]     YOB: 1965  Admit Date:    9/28/2022  9:56 AM    Chief Complaint: Nephrology following for ESRD. Subjective:  Patient was seen and examined this morning. Feels better. His breathing is improved. 30% FiO2 on cpap. Had chest tube placed yesterday and had 2 liters removed with dialysis. Objective:  24HR INTAKE/OUTPUT:    Intake/Output Summary (Last 24 hours) at 9/29/2022 0829  Last data filed at 9/29/2022 0340  Gross per 24 hour   Intake 400 ml   Output 5110 ml   Net -4710 ml         I/O last 3 completed shifts: In: 400   Out: 5110 [Chest Tube:2710]  No intake/output data recorded.    Admission weight: 250 lb (113.4 kg)  Wt Readings from Last 3 Encounters:   09/28/22 217 lb 9.5 oz (98.7 kg)   09/13/21 250 lb (113.4 kg)   07/26/21 240 lb (108.9 kg)        Vitals :   Vitals:    09/29/22 0402 09/29/22 0415 09/29/22 0645 09/29/22 0745   BP:    (!) 125/57   Pulse:  83 79 80   Resp: 21 22 24 22   Temp:    98 °F (36.7 °C)   TempSrc:    Oral   SpO2:  97% 96% 100%   Weight:       Height:           Physical examination  General Appearance: alert and cooperative with exam, appears comfortable, no distress  Mouth/Throat: Oral mucosa moist  Neck: No JVD  Lungs: diminished  Heart:  S1, S2 heard  GI: soft, non-tender  Extremities: left ankle edema noted, improved, left arm AV fistula    Medications:  Infusion:    sodium chloride       Meds:    aspirin  81 mg Oral Daily    atorvastatin  40 mg Oral Nightly    cloNIDine  0.3 mg Oral TID    doxazosin  8 mg Oral Nightly    famotidine  20 mg Oral Daily    ferrous sulfate  325 mg Oral Daily with breakfast    folic acid  3,880 mcg Oral Daily    fluticasone  1 spray Nasal Daily    isosorbide mononitrate  120 mg Oral Daily    sertraline  100 mg Oral Daily    verapamil  240 mg Oral Daily    multivitamin  1 tablet Oral Daily    sodium chloride flush  10 mL IntraVENous 2 times per day    heparin (porcine)  5,000 Units SubCUTAneous 3 times per day     Meds prn: hydrOXYzine HCl, traZODone, sodium chloride flush, sodium chloride, ondansetron **OR** ondansetron, polyethylene glycol, acetaminophen, HYDROcodone 5 mg - acetaminophen **OR** HYDROcodone 5 mg - acetaminophen, morphine     Lab Data :  CBC:   Recent Labs     09/28/22  1000 09/29/22  0446   WBC 6.4 5.9   HGB 11.1* 10.4*   HCT 36.3* 35.7*   * 123*     CMP:  Recent Labs     09/28/22  1000 09/29/22  0446    137   K 4.6 5.2    101   CO2 27 26   BUN 42* 28*   CREATININE 7.6* 6.6*   GLUCOSE 131* 114*   CALCIUM 8.5 8.6     Hepatic:   Recent Labs     09/28/22  1000 09/29/22  0446   LABALBU 3.8 3.1*   AST 12 9   ALT 11 8*   BILITOT 0.3 0.3   ALKPHOS 95 86         Assessment and Plan:    1. ESRD on HD TTS  -had extra treatment yesterday for UF  -breathing improved  -HD again today    2. Acute hypoxic/hypercapneic resp failure  3. Pulmonary edema  4. Right pleural effusion s/p chest tube  5. Anemia in CKD, hgb at goal  6. Thrombocytopenia  7. DM  8. Secondary hyperparathyroidism    D/W patient     Kayleigh Meeks DO  [facility]    This report has been created using voice recognition software.  It may contain minor errors which are inherent in voice recognition technology

## 2022-09-29 NOTE — CONSULTS
Patient:  Aguilar Boyce    Unit/Bed:4K-14/014-A  MRN: [id number]   PCP: Saloni Aleman MD  Date of Admission: 9/28/2022    Assessment and Plan(All pulmonary edema, renal failure, PE, and respiratory failure diagnoses are acute in nature unless otherwise specified):        Right Pleural Effusion: Loculated. Chest tube placed on 9/28/2022. Serosanguineous. No history of trauma. Concern for malignancy. Awaiting cytology. After fluid completely drained, CT scan chest.  Entrapped lung.:  Continue with chest tube and continuous suction. Assess how long reexpands. ESRD: Dialysis dependent. DM: Type II. On dietary restriction. Hypertension: Continue with doxazosin, isosorbide mononitrate, and verapamil. Chronically on high-dose clonidine. Secondary Hyperparathyroidism: Secondary to renal failure. PTSD: On sertraline. Substance Abuse: Looking cessation encouraged. Hyperlipidemia: On atorvastatin. Anemia of Chronic Disease: Chronic iron supplementation. Does not require erythropoietin. CC:  SOB  HPI: Patient is a 19-year-old black male active smoker. Has a prior history of cocaine utilization. Has end-stage renal disease secondary to focal segmental glomerulosclerosis. And is dialysis dependent. He has secondary hyperparathyroidism, and anemia of chronic disease. He has a history of diabetes which is managed with diet. Patient claims an allergy to insulin therapy. Patient has hypertension PTSD and hyperlipidemia. Patient was admitted on 9/28/2022 with a 3-week history of increasing shortness of breath associated with lower extremity edema and progressive dyspnea on exertion. He had mild hypoxemia. Work-up demonstrated a large loculated right pleural effusion. Had increased work of breathing requiring BiPAP salvage. He was admitted to the medical floor and subsequently underwent chest tube placement for right pleural effusion on 9/28/2022.   For further details, please review the assessment and plan.  ROS: Patient complains of increasing shortness of breath of 1 months duration. Increased fatigue. Increased lower extremity swelling. Difficulty sleeping. Chronic orthopnea. Has to sleep in a recliner. No chest pain fever wheezing or vomiting. No abdominal pain. No seizures or loss of consciousness. No suicidal ideation. Weight gain. No hemoptysis epistaxis hematuria or melena. All other review of systems are otherwise negative. PMH:  Per HPI  SHX:  Active Tobacco Abuse. Prior Cocaine abuse. FHX: Positive for HTN  Allergies: Regular Insulin. Medications:     sodium chloride        aspirin  81 mg Oral Daily    atorvastatin  40 mg Oral Nightly    cloNIDine  0.3 mg Oral TID    doxazosin  8 mg Oral Nightly    famotidine  20 mg Oral Daily    ferrous sulfate  325 mg Oral Daily with breakfast    folic acid  3,702 mcg Oral Daily    fluticasone  1 spray Nasal Daily    isosorbide mononitrate  120 mg Oral Daily    sertraline  100 mg Oral Daily    verapamil  240 mg Oral Daily    multivitamin  1 tablet Oral Daily    sodium chloride flush  10 mL IntraVENous 2 times per day    heparin (porcine)  5,000 Units SubCUTAneous 3 times per day       Vital Signs:   T: 97.7: P: 90 RR: 18 B/P: 90/50: FiO2: 40%: O2 Sat:98%: I/O: 400/5030 GCS: 15  Body mass index is 27.2 kg/m². Slava Virgen General:   Chronically ill appearing black male. HEENT:  normocephalic and atraumatic. No scleral icterus. PERR  Neck: supple. No Thyromegaly. Lungs: No breath sounds to right lung prior to thoracentesis with dullness to percussion. Improved breath sounds post thoracentesis. No retractions  Cardiac: RRR. No JVD. Abdomen: soft. Nontender. Extremities:  No clubbing, cyanosis x 4.  2+  LE edema bilaterally. Vasculature: capillary refill < 3 seconds. Palpable dorsalis pedis pulses. Skin:  warm and dry. Psych:  Alert and oriented x3. Affect appropriate  Lymph:  No supraclavicular adenopathy. Neurologic:  No focal deficit.  No seizures. Data: (All radiographs, tracings, PFTs, and imaging are personally viewed and interpreted unless otherwise noted). Telemetry shows normal sinus rhythm. X-ray shows large right-sided loculated pleural effusion. Ultrasound shows loculated pleural effusion. Sodium 142, potassium 4.6, chloride 102, bicarb 27, BUN 42, creatinine 7.6, glucose 131. Troponin 0.054. White blood cell count 6.4, hemoglobin 11.1, platelets 274. Case discussed with Dr. Hever Stack. Time was discontiguous and does not include procedures. Electronically signed by Emily Fonseca M.D.

## 2022-09-29 NOTE — PROGRESS NOTES
Virtual nurse completed admission with wife at the bedside. Patient is on bipap and sleeping. Call light in reach. Virtual nursing program explained to wife and she accepted the service for the patient.

## 2022-09-29 NOTE — PROGRESS NOTES
CRITICAL CARE PROGRESS NOTE      Patient:  Selvin Sanchez    Unit/Bed:4K-14/014-A  YOB: 1965  MRN: [id number]   PCP: Caroline Pappas MD  Date of Admission: 9/28/2022  Chief Complaint:- Shortness of breath    Assessment and Plan:    Pleural effusion, right:  CXR demonstrated large loculated right-sided pleural effusion. Chest tube placed 9/28. Initial output 2 L serosanguineous fluid. Lights criteria indicative of exudative effusion, concerning for malignancy given smoking history. Cytology and cell count differential pending. Continue to draw cytology x3 for concentrated yield. Monitor I&Os. CT chest planned today. Critical care following for chest tube management. Entrapped lung:  Continue with chest tube and continuous suction. Assess how lung reexpands. Acute hypoxic respiratory failure: Secondary to large loculated right pleural effusion with some concomitant volume overload. Chest tube present. Echo pending. On BiPAP. Wean as tolerated for SpO2 > 92%. ESRD: On HD. Complicated with history of FSGS and DM. Nephrology following. HTN: With history of left renal artery stenosis. Continue with doxazosin, isosorbide mononitrate and verapamil. Chronically on high-dose clonidine. Primary following. HLD: On Lipitor. History of thoracic and AAA: with dissection s/p aortic stent graft 7/2018. DM2:  Last HgbA1c 5.0 2/2022. Diet controlled. Primary following  Secondary hyperparathyroidism: Per chart review, secondary to renal failure  REZA: per chart review, not on CPAP. Chronic macrocytic anemia:  Baseline Hgb ~10-11. Stable. No obvious signs of GIB. Depression: On Zoloft  GERD:   On Pepcid  History of substance abuse: Per chart review, including EtOH and cocaine. Encouraged cessation. Tobacco abuse: Counseled on smoking cessation.       INITIAL H AND P AND ICU COURSE:  The patient is a 56-year-old male with a past medical history of HTN, HLD, TAA/AAA s/p aortic stent graft, FSGS, ESRD on HD, secondary hyperparathyroidism, sleep apnea, anemia, GERD, depression, history of substance abuse including cocaine/EtOH and current smoker who presented to [facility] ED for shortness of breath 9/28. The patient reports 3-week history of increased shortness of breath and progressive dyspnea on exertion. This is associated with worsening lower extremity edema. On initial evaluation he was noted to have mild hypoxemia. CXR demonstrated large right pleural effusion with associated compressive atelectasis or right lower lobe collapse. Mildly increased groundglass opacities in left lung. He was placed on BiPAP salvage for increased work of breathing. The patient was admitted to the medical floor and subsequently underwent a chest tube placement for a large right loculated pleural effusion 9/28. Initial output was 2 L of serosanguineous fluid. This was sent for cytology, cell count differential and studies for lights criteria. Past 24 hours:  No significant events overnight. This morning the patient is doing well. He reports improvement in his breathing since placement of chest tube. He does continue to note some shortness of breath but denies any other complaints. He remains on BiPAP. Chest tube output 500 cc overnight. Otherwise hemodynamic stable, vitals WNL. Past Medical History:  Per HPI. Family History:  Positive for HTN. Social History: Active Tobacco Abuse. Prior Cocaine abuse. .    ROS   Denies any N/V, fevers, chills, headache, changes in vision, chest pain, palpitations, abdominal or urinary complaints. Does endorse improving shortness of breath.     Scheduled Meds:   aspirin  81 mg Oral Daily    atorvastatin  40 mg Oral Nightly    cloNIDine  0.3 mg Oral TID    doxazosin  8 mg Oral Nightly    famotidine  20 mg Oral Daily    ferrous sulfate  325 mg Oral Daily with breakfast    folic acid  9,102 mcg Oral Daily    fluticasone  1 spray Nasal Daily    isosorbide mononitrate  120 mg Oral Daily    sertraline  100 mg Oral Daily    verapamil  240 mg Oral Daily    multivitamin  1 tablet Oral Daily    sodium chloride flush  10 mL IntraVENous 2 times per day    heparin (porcine)  5,000 Units SubCUTAneous 3 times per day     Continuous Infusions:   sodium chloride         PHYSICAL EXAMINATION:  T:  98.4.  P:  84. RR:  23. B/P:  116/59. FiO2:  0.40. O2 Sat:  99% on PAP. I/O:  net - 2.7 L over 24 hrs  Body mass index is 27.2 kg/m². GCS:   15    General: Acute on chronically ill-appearing, interactive and cooperative  HEENT:  normocephalic and atraumatic. No scleral icterus. PERR  Neck: supple. No Thyromegaly. Lungs: Diminished breath sounds in right lower and midlung fields. No wheezes, rales or rhonchi. No retractions  Cardiac: RRR. No JVD. Abdomen: soft. Nontender. Extremities:  No clubbing, cyanosis. + 2 BLE edema  Vasculature: capillary refill < 3 seconds. Palpable dorsalis pedis pulses. Skin:  warm and dry. Psych:  Alert and oriented x3. Affect appropriate  Lymph:  No supraclavicular adenopathy. Neurologic:  No focal deficit. No seizures. Data: (All radiographs, tracings, PFTs, and imaging are personally viewed and interpreted unless otherwise noted). 9/29/22 sodium 137, potassium 5.2, chloride 101, CO2 26, BUN 28, creatinine 6.8, glucose 114, AG 10.0  9/29/22 WBC 5.9, Hgb 10.4, HCT 35.7,   9/29/22 albumin albumin 3.1, alk phos 86, ALT 8, AST 9, bilirubin 0.3, protein 6.5  9/28/22 CXR demonstrates new right-sided chest tube in place. Diminution in the abnormal density at the right lung base. Small pneumothorax at right lung base. Cardiomegaly. Aortic stent graft placed  9/28/22 CXR demonstrates large right pleural effusion with associated compressive atelectasis or right lower lobe collapse. Mildly increased groundglass opacities in left lung. Telemetry shows NSR        Case and plan discussed with Dr. Corey Graf.         Electronically signed by Irma Tarango, [id number] SPECIALIST   Patient seen by me including key components of medical care. Case discussed with Dr Julia Awad. Awaiting cytology and CT chest.  Italicized font, if present,  represents changes to the note made by me. CC time 20 minutes. Time was discontiguous. Time does not include procedure. Time does include my direct assessment of the patient and coordination of care. Time represents more than 50% of the time involved with patient care by the [address] [place] [address] team.  Electronically signed by Marleny Peres.  Lieutenant Dylan GUIDRY.

## 2022-09-29 NOTE — CARE COORDINATION
9/29/22, 2:32 PM EDT      DISCHARGE PLANNING EVALUATION    Joel George  Admitted: 9/28/2022  Hospital Day: 1    Location: [facility] Reason for admit: Hypoxia [R09.02]  ESRD (end stage renal disease) ([facility] [address] [N18.6]  Acute respiratory failure with hypoxia and hypercarbia ([facility] [address] [J96.01, J96.02]    Procedure:     9/28  CXR:   Large right pleural effusion with associated compressive atelectasis or right lower lobe collapse. Mild increase groundglass opacities in the left lung are nonspecific but can suggest underlying pulmonary edema or pneumonia. 9/28 insertion of right chest tube at - 20 cm    Past Medical History:   Diagnosis Date    AAA (abdominal aortic aneurysm) (HCC)     NURIS (acute kidney injury) ([facility] [address] 9/24/2015    Anemia associated with chronic renal failure     Arthritis     stated in hands    Cocaine abuse ([facility] [address] 5/10/2014    Depression     Diabetes mellitus ([facility] [address]     pt states he no longer has diabetes he has lost alot of davion. FSGS (focal segmental glomerulosclerosis) 5/23/2013    Hemodialysis patient ([facility] [address] 10/17/2016    on hemodialysis with [facility] 1/16/2012    History of blood transfusion     Hyperlipidemia     Hyperphosphatemia 5/21/2016    Hypertension     Left renal artery stenosis ([facility] [address] 5/22/2014    Monoclonal (M) protein disease, multiple 'M' protein     Nicotine dependence 6/16/2014    Noncompliance     Pneumonia     Psychiatric problem     PTSD (post-traumatic stress disorder)     Pt vet from [place]    Secondary hyperparathyroidism (of renal origin)     Sleep apnea        Barriers to Discharge: oxygen 6L /min NC, sats 95%, creatinine 6.6, nephrology follows HD patient, received 4 hr run HD with Albumin IV given. PCP: Susan Mendoza MD    Readmission Risk              Risk of Unplanned Readmission:  28           Patient Goals/Plan/Treatment Preferences: Yariel HUMPHRIES to see pt.   Receives HD on Tue, Th, Sat established with local nephrology group..    Transportation/Food Security/Housekeeping Addressed: no issues identified

## 2022-09-29 NOTE — FLOWSHEET NOTE
09/29/22 0830 09/29/22 1250   Vital Signs   BP (!) 103/55 (!) 111/52   Temp 97.5 °F (36.4 °C) 97.4 °F (36.3 °C)   Heart Rate 83 78   Resp 15 15   SpO2 100 % 95 %   Weight 221 lb 5.5 oz (100.4 kg) 219 lb 2.2 oz (99.4 kg)   Weight Method  --  Bed scale   Percent Weight Change 1.72 -1   Post-Hemodialysis Assessment   Post-Treatment Procedures  --  Blood returned; Access bleeding time < 10 minutes   Machine Disinfection Process  --  Acid/Vinegar Clean;Heat Disinfect; Exterior Machine Disinfection   Blood Volume Processed (Liters)  --  92.2 l/min   Dialyzer Clearance  --  Heavily streaked   Duration of Treatment (minutes)  --  240 minutes   Heparin Amount Administered During Treatment (mL)  --  0 mL   Hemodialysis Intake (ml)  --  400 ml   Hemodialysis Output (ml)  --  1400 ml   NET Removed (ml)  --  1000   Tolerated Treatment  --  Good   4 hour treatment completed. 1L of fluid removed. Albumin 25g given with dialysis per Dr. Clary Snyder. Patient SBP running 80-90's throughout treatment. 02 decreased to 4L. Patient tolerated with Sp02 running 95-99%. Pressure held to access for 10 mins x2. Dressing clean/dry and intact upon leaving the unit. Report given to primary RN. Charting printed and placed in bin to be scanned into EMR.

## 2022-09-29 NOTE — PLAN OF CARE
Problem: Discharge Planning  Goal: Discharge to home or other facility with appropriate resources  9/28/2022 2350 by Christin To RN  Outcome: Progressing     Problem: Pain  Goal: Verbalizes/displays adequate comfort level or baseline comfort level  9/28/2022 2350 by Christin To RN  Outcome: Progressing     Problem: Safety - Adult  Goal: Free from fall injury  9/28/2022 2350 by Christin To RN  Outcome: Progressing     Problem: ABCDS Injury Assessment  Goal: Absence of physical injury  9/28/2022 2350 by Christin To RN  Outcome: Progressing     Problem: Respiratory - Adult  Goal: Achieves optimal ventilation and oxygenation  Outcome: Progressing  Flowsheets (Taken 9/28/2022 2350)  Achieves optimal ventilation and oxygenation:   Assess for changes in respiratory status   Position to facilitate oxygenation and minimize respiratory effort   Assess for changes in mentation and behavior   Oxygen supplementation based on oxygen saturation or arterial blood gases  Note: Cough and deep breathe promotion.   Encourage turning to improve airway clearance       Problem: Cardiovascular - Adult  Goal: Maintains optimal cardiac output and hemodynamic stability  Outcome: Progressing  Flowsheets (Taken 9/28/2022 2350)  Maintains optimal cardiac output and hemodynamic stability:   Monitor blood pressure and heart rate   Monitor urine output and notify Licensed Independent Practitioner for values outside of normal range   Assess for signs of decreased cardiac output   Administer fluid and/or volume expanders as ordered     Problem: Cardiovascular - Adult  Goal: Absence of cardiac dysrhythmias or at baseline  Outcome: Progressing  Flowsheets (Taken 9/28/2022 2350)  Absence of cardiac dysrhythmias or at baseline:   Monitor cardiac rate and rhythm   Assess for signs of decreased cardiac output     Problem: Skin/Tissue Integrity - Adult  Goal: Skin integrity remains intact  Outcome: Progressing  Flowsheets (Taken 9/28/2022 2350)  Skin Integrity Remains Intact: Monitor for areas of redness and/or skin breakdown  Note: Encouraging good fluid and diet intake. Encourage hygiene  Increase movement and encourage turns. Problem: Infection - Adult  Goal: Absence of infection at discharge  Outcome: Progressing  Flowsheets (Taken 9/28/2022 2350)  Absence of infection at discharge:   Assess and monitor for signs and symptoms of infection   Monitor all insertion sites i.e., indwelling lines, tubes and drains   Monitor lab/diagnostic results     Problem: Infection - Adult  Goal: Absence of infection during hospitalization  Outcome: Progressing  Flowsheets (Taken 9/28/2022 2350)  Absence of infection during hospitalization:   Assess and monitor for signs and symptoms of infection   Monitor lab/diagnostic results   Monitor all insertion sites i.e., indwelling lines, tubes and drains     Problem: Metabolic/Fluid and Electrolytes - Adult  Goal: Electrolytes maintained within normal limits  Outcome: Progressing  Flowsheets (Taken 9/28/2022 2350)  Electrolytes maintained within normal limits:   Monitor labs and assess patient for signs and symptoms of electrolyte imbalances   Administer electrolyte replacement as ordered     Problem: Hematologic - Adult  Goal: Maintains hematologic stability  Outcome: Progressing  Flowsheets (Taken 9/28/2022 2350)  Maintains hematologic stability:   Assess for signs and symptoms of bleeding or hemorrhage   Monitor labs for bleeding or clotting disorders. Pain Assessment: 0-10  Pain Level: 4   Patient's Stated Pain Goal: 0 - No pain   Is pain goal met at this time? Yes     Non-Pharmaceutical Pain Intervention(s): Rest      Care plan reviewed with patient. Patient verbalizes understanding of the plan of care and contributes to goal setting.

## 2022-09-29 NOTE — PROGRESS NOTES
While patient is in bed and is sleeping, after his dialysis, this  came in and sat with his wife while we visited. She explained how tired he was and that he is [facility] but does not have a Faith at this time. Prayer is offered and accepted for his healing and strength.     09/29/22 1719   Encounter Summary   Encounter Overview/Reason  Initial Encounter   Service Provided For: Patient and family together   Referral/Consult From: [facility]   Support System Spouse   Last Encounter  09/29/22   Complexity of Encounter Low   Begin Time 1710   End Time  1720   Total Time Calculated 10 min   Encounter    Type Initial Screen/Assessment   Spiritual/Emotional needs   Type Spiritual Support   Assessment/Intervention/Outcome   Assessment Other (Comment)  (sleeping after dialysis)   Intervention Active listening;Nurtured Hope;Prayer (assurance of)/[place];Sustaining Presence/Ministry of presence   Outcome Comfort   Follow up later when he is awake. Care Plan:  Continue spiritual and emotional care for patient and family. Including prayers.

## 2022-09-29 NOTE — PROGRESS NOTES
Comprehensive Nutrition Assessment    Type and Reason for Visit:  Initial, Positive Nutrition Screen    Nutrition Recommendations/Plan:   Continue diet as ordered and encourage PO intake at best efforts. ONS ordered: Nepro TID  Monitor nutrition related lab values, PO intake, medications, weights, need for further nutrition education and provide appropriate nutrition interventions as needed. Malnutrition Assessment:  Malnutrition Status: At risk for malnutrition (Comment) (09/29/22 1605)    Context:  Acute Illness     Findings of the 6 clinical characteristics of malnutrition:  Energy Intake:   (wife reports less than 50% PO intake for 2-3 weeks; intake improving % B & L today 9/29)  Weight Loss:  Unable to assess (pt reports 5-10lb weight loss but hard to assess d/t dialysis)     Body Fat Loss:  No significant body fat loss     Muscle Mass Loss:  No significant muscle mass loss    Fluid Accumulation:  Moderate to Severe     Strength:  Not Performed    Nutrition Assessment:     Pt. nutritionally compromised AEB significantly poor PO intake x2-3 weeks PTA. At risk for further nutrition compromise r/t admit 9/28-presented with SOB; large right sided pleural effusion- [place] tube placed 9/28; respiratory acidosis; and underlying medical condition (PMH: cocaine abuse, DM, ESRD on HD).        Nutrition Related Findings:    Pt. Report/Treatments/Miscellaneous: spoke with patient and wife this afternoon; pt just returned to room from dialysis- very lethargic, discussed nutrition mainly with patient's wife; pt does drink nepro at home and likes; wife reports pt typically has a very good appetite but for the past 2-3 weeks he has been eating less than one meal per day secondary to not feeling well; lunch tray ~100% consumed on assessment; dialysis 9/29- 1 L removed; consider providing further nutrition education at next assessment when pt more awake and alert  GI Status: last BM per EMR 9/27  Pertinent Labs: Na 137, K 5.2, BUN 28, creatinine 6.6, magnesium 2.3, glucose 114, hgb 10.4, A1C 5.0 (2/2020)  Pertinent Meds: iron, folvite, MVI, pepcid    Wound Type: None       Current Nutrition Intake & Therapies:    Average Meal Intake:  (% at breakfast and lunch 9/29)  Average Supplements Intake:  (to start today)  ADULT DIET; Regular; 5 carb choices (75 gm/meal); Low Fat/Low Chol/High Fiber/2 gm Na; Low Potassium (Less than 3000 mg/day); Low Phosphorus (Less than 1000 mg)  ADULT ORAL NUTRITION SUPPLEMENT; Breakfast, Dinner; Renal Oral Supplement    Anthropometric Measures:  Height: 6' 3\" (190.5 cm)  Ideal Body Weight (IBW): 196 lbs (89 kg)    Admission Body Weight: 231 lb 9.6 oz (105.1 kg) (9/28: +2 pitting edema)  Current Body Weight: 219 lb 2.2 oz (99.4 kg) (9/29: +1 pitting BLE),   IBW. Weight Source: Bed Scale  Current BMI (kg/m2): 27.4  Usual Body Weight:  (UBW ~230 lbs; wt hx per EMR: 1/28/20: 227 lbs 15.3 oz, 2/26/20: 236 lbs 8.9 oz, 7/7/20: 225 lbs 4.8 oz, 9/15/20: 228 lbs 3.2 oz, 9/17/20: 214 lbs 15.2 oz, 9/28/22: 217 lbs 9.5 oz)                       BMI Categories: Overweight (BMI 25.0-29. 9)    Estimated Daily Nutrient Needs:  Energy Requirements Based On: Kcal/kg  Weight Used for Energy Requirements: Current  Energy (kcal/day): 1854-9178 kcals/day (30-35 kcals/kg) (increased nutritional needs d/t dialysis)  Weight Used for Protein Requirements: Ideal  Protein (g/day): 134-178 g/day (1.5-2.0 g/kg IBW) (Increased nutritional needs d/t dialysis)    Nutrition Diagnosis:   Inadequate protein-energy intake related to acute injury/trauma as evidenced by poor intake prior to admission (wife reports decreased intake x2-3 weeks d/t SOB and not feeling well.)    Nutrition Interventions:   Food and/or Nutrient Delivery: Continue Current Diet, Start Oral Nutrition Supplement  Nutrition Education/Counseling: Education initiated (Encouraged PO intake at best efforts)  Coordination of Nutrition Care: Continue to monitor while inpatient       Goals:     Goals: PO intake 75% or greater, by next RD assessment       Nutrition Monitoring and Evaluation:      Food/Nutrient Intake Outcomes: Food and Nutrient Intake, Supplement Intake  Physical Signs/Symptoms Outcomes: Biochemical Data, GI Status, Fluid Status or Edema, Weight, Skin, Nutrition Focused Physical Findings, Meal Time Behavior    Discharge Planning:    Continue Oral Nutrition Supplement, Too soon to determine     Christian Gardner RD  Contact: [phone]

## 2022-09-29 NOTE — FLOWSHEET NOTE
Stable 3 hour TX completed. Removed 2 L of fluid as ordered. pt tolerated fluid removal well. Pressure held to each needle site x 10 min. drsg clean, dry, and intact upon leaving unit. TX record printed for scanning into EMR. Report given to primary RN.     09/28/22 1700 09/28/22 2013   Vital Signs   /64 134/65   Temp 97.3 °F (36.3 °C) 97.8 °F (36.6 °C)   Heart Rate 88 96   Resp 17 19   SpO2  --  98 %   Weight 222 lb 0.1 oz (100.7 kg) 217 lb 9.5 oz (98.7 kg)   Weight Method Bed scale Bed scale   Post-Hemodialysis Assessment   Post-Treatment Procedures  --  Blood returned; Access bleeding time < 10 minutes   Machine Disinfection Process  --  Acid/Vinegar Clean;Heat Disinfect; Exterior Machine Disinfection   Blood Volume Processed (Liters)  --  66.5 l/min   Dialyzer Clearance  --  Lightly streaked   Duration of Treatment (minutes)  --  180 minutes   Heparin Amount Administered During Treatment (mL)  --  0 mL   Hemodialysis Intake (ml)  --  400 ml   Hemodialysis Output (ml)  --  2400 ml   NET Removed (ml)  --  2000   Tolerated Treatment  --  Good

## 2022-09-29 NOTE — PLAN OF CARE
Problem: Discharge Planning  Goal: Discharge to home or other facility with appropriate resources  Outcome: Progressing  Flowsheets (Taken 9/29/2022 0800)  Discharge to home or other facility with appropriate resources:   Identify barriers to discharge with patient and caregiver   Arrange for needed discharge resources and transportation as appropriate   Identify discharge learning needs (meds, wound care, etc)     Problem: Pain  Goal: Verbalizes/displays adequate comfort level or baseline comfort level  Outcome: Progressing  Flowsheets (Taken 9/29/2022 0745)  Verbalizes/displays adequate comfort level or baseline comfort level:   Encourage patient to monitor pain and request assistance   Assess pain using appropriate pain scale   Administer analgesics based on type and severity of pain and evaluate response     Problem: Safety - Adult  Goal: Free from fall injury  Outcome: Progressing  Flowsheets (Taken 9/29/2022 0800)  Free From Fall Injury: Instruct family/caregiver on patient safety     Problem: ABCDS Injury Assessment  Goal: Absence of physical injury  Outcome: Progressing  Flowsheets (Taken 9/29/2022 0800)  Absence of Physical Injury: Implement safety measures based on patient assessment     Problem: Respiratory - Adult  Goal: Achieves optimal ventilation and oxygenation  9/29/2022 1826 by Albertina Manrique RN  Outcome: Progressing  Flowsheets (Taken 9/29/2022 0800)  Achieves optimal ventilation and oxygenation:   Assess for changes in respiratory status   Assess for changes in mentation and behavior   Position to facilitate oxygenation and minimize respiratory effort   Oxygen supplementation based on oxygen saturation or arterial blood gases  9/29/2022 0459 by Emelia Ramos RCP  Outcome: Progressing     Problem: Cardiovascular - Adult  Goal: Maintains optimal cardiac output and hemodynamic stability  Outcome: Progressing  Flowsheets (Taken 9/29/2022 0800)  Maintains optimal cardiac output and hemodynamic stability:   Monitor blood pressure and heart rate   Monitor urine output and notify Licensed Independent Practitioner for values outside of normal range   Assess for signs of decreased cardiac output  Goal: Absence of cardiac dysrhythmias or at baseline  Outcome: Progressing  Flowsheets (Taken 9/29/2022 0800)  Absence of cardiac dysrhythmias or at baseline: Monitor cardiac rate and rhythm     Problem: Skin/Tissue Integrity - Adult  Goal: Skin integrity remains intact  Outcome: Progressing  Flowsheets (Taken 9/29/2022 0800)  Skin Integrity Remains Intact: Monitor for areas of redness and/or skin breakdown     Problem: Infection - Adult  Goal: Absence of infection at discharge  Outcome: Progressing  Flowsheets (Taken 9/29/2022 0800)  Absence of infection at discharge:   Assess and monitor for signs and symptoms of infection   Monitor lab/diagnostic results   Administer medications as ordered  Goal: Absence of infection during hospitalization  Outcome: Progressing  Flowsheets (Taken 9/29/2022 0800)  Absence of infection during hospitalization:   Assess and monitor for signs and symptoms of infection   Monitor lab/diagnostic results   Monitor all insertion sites i.e., indwelling lines, tubes and drains     Problem: Metabolic/Fluid and Electrolytes - Adult  Goal: Electrolytes maintained within normal limits  Outcome: Progressing  Flowsheets (Taken 9/29/2022 0800)  Electrolytes maintained within normal limits:   Monitor labs and assess patient for signs and symptoms of electrolyte imbalances   Administer electrolyte replacement as ordered   Monitor response to electrolyte replacements, including repeat lab results as appropriate     Problem: Hematologic - Adult  Goal: Maintains hematologic stability  Outcome: Progressing  Flowsheets (Taken 9/29/2022 0800)  Maintains hematologic stability: Assess for signs and symptoms of bleeding or hemorrhage     Problem: Skin/Tissue Integrity  Goal: Absence of new skin breakdown  Description: 1. Monitor for areas of redness and/or skin breakdown  2. Assess vascular access sites hourly  3. Every 4-6 hours minimum:  Change oxygen saturation probe site  4. Every 4-6 hours:  If on nasal continuous positive airway pressure, respiratory therapy assess nares and determine need for appliance change or resting period. Outcome: Progressing  Note: No new skin breakdown assessed this shift, skin care provided per unit policy and as needed.      Problem: Chronic Conditions and Co-morbidities  Goal: Patient's chronic conditions and co-morbidity symptoms are monitored and maintained or improved  Outcome: Progressing  Flowsheets (Taken 9/29/2022 0800)  Care Plan - Patient's Chronic Conditions and Co-Morbidity Symptoms are Monitored and Maintained or Improved:   Monitor and assess patient's chronic conditions and comorbid symptoms for stability, deterioration, or improvement   Update acute care plan with appropriate goals if chronic or comorbid symptoms are exacerbated and prevent overall improvement and discharge   Collaborate with multidisciplinary team to address chronic and comorbid conditions and prevent exacerbation or deterioration     Problem: Nutrition Deficit:  Goal: Optimize nutritional status  Outcome: Progressing  Flowsheets (Taken 9/29/2022 1826)  Nutrient intake appropriate for improving, restoring, or maintaining nutritional needs:   Monitor oral intake, labs, and treatment plans   Recommend appropriate diets, oral nutritional supplements, and vitamin/mineral supplements   Order, calculate, and assess calorie counts as needed

## 2022-09-29 NOTE — PROGRESS NOTES
Internal Medicine Resident Progress Note    Patient:  Michele Lopez    YOB: 1965  Unit/Bed:4K-14/014-A  MRN: [id number]    Acct: [de-identified]   PCP: Dakota Herrera MD    Date of Admission: 9/28/2022      Assessment/Plan:  Acute hypoxic respiratory failure-improved:  -Patient was satting 67% on room air upon presentation to ED  -Was placed on BiPAP  -#2 below right pleural effusion seen on CXR  Right pleural effusion: Seen on CXR. -Chest tube placed 9/28. Fluid serosanguineous.  -Concern for malignancy.  -Awaiting cytology results  -Awaiting CT scan result  Entrapped lung: Continue with chest tube/continuous suction  -CT scan to be performed today. 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  -Continue dialysis in hospital  DM2: Dietary restriction  -Monitor BG daily  HTN: Continue home clonidine, doxazosin, Imdur  HFpEF: Echo dated 3/10/2020 showed EF 60%, severe concentric LVH, moderate systolic anterior motion of anterior leaflet, moderate LVOT obstruction.  -Awaiting 9/29 echo result  -Continue CALAN SR,  PTSD: Continue home sertraline  Poly-substance abuse: encourage cessation  Hyperlipidemia: Continue home Lipitor  Anemia of chronic disease: On chronic iron supplementation.   Does not require erythropoietin at this time.  -Continue iron 325 every morning with breakfast  DVT PPx: Heparin  Chronic Conditions (reviewed and stable unless otherwise stated)      Expected discharge date: TBD    Disposition:   [x] Home  [] TCU  [] Rehab  [] Psych  [] SNF  [] [facility]  [] Other-    ===================================================================      Chief Complaint: Large loculated pleural effusion    Hospital Course: Patient Kyra Springer is a 29-year-old -American male with a PMHx of AAA, HTN, LVH, ESRD on HD, DM 2, hyperparathyroidism, and polysubstance abuse who presented to [facility] ED with cc: Shortness of breath which has been ongoing for the past month to month and a half. Patient has orthopnea and dyspnea on exertion. He also complains of lower extremity edema which she is also had for the past month. He reports some abdominal distention. He says that he has been compliant with his dialysis and medications, and thought that they were not diuresing him enough in the dialysis center. He states that his dyspnea has become so bad that he cannot walk 10 feet without becoming winded and having to sit down. Patient was hypoxic on arrival he was placed on BiPAP with some improvement. He was taken to x-ray which showed a large right pleural effusion and edema. He was then admitted to the floor at 1:00 on 9/28. Patient was taken to room 4K14 where Dr. Gary Diaz placed a 14 [place] chest tube at approximately 1700 that evening with minimal blood loss. Subjective (past 24 hours): 9/29: Patient states that he feels significantly better today. He says he is able to breathe and with the exception of irritation at the chest tube site, he feels like he has \"super lungs\". He does complain about pain at the chest tube site but he says it is not terrible. ROS: reviewed complete ROS unchanged unless otherwise stated in hospital course/subjective portion.        Medications:  Reviewed    Infusion Medications    sodium chloride       Scheduled Medications    aspirin  81 mg Oral Daily    atorvastatin  40 mg Oral Nightly    cloNIDine  0.3 mg Oral TID    doxazosin  8 mg Oral Nightly    famotidine  20 mg Oral Daily    ferrous sulfate  325 mg Oral Daily with breakfast    folic acid  9,260 mcg Oral Daily    fluticasone  1 spray Nasal Daily    isosorbide mononitrate  120 mg Oral Daily    sertraline  100 mg Oral Daily    verapamil  240 mg Oral Daily    multivitamin  1 tablet Oral Daily    sodium chloride flush  10 mL IntraVENous 2 times per day    heparin (porcine)  5,000 Units SubCUTAneous 3 times per day     PRN Meds: hydrOXYzine HCl, traZODone, sodium chloride flush, sodium chloride, ondansetron **OR** ondansetron, polyethylene glycol, acetaminophen, HYDROcodone 5 mg - acetaminophen **OR** HYDROcodone 5 mg - acetaminophen, morphine        Intake/Output Summary (Last 24 hours) at 9/29/2022 1733  Last data filed at 9/29/2022 1516  Gross per 24 hour   Intake 1400 ml   Output 4310 ml   Net -2910 ml       Exam:  BP 93/62   Pulse 70   Temp 98.5 °F (36.9 °C) (Oral)   Resp 18   Ht 6' 3\" (1.905 m)   Wt 219 lb 2.2 oz (99.4 kg)   SpO2 99%   BMI 27.39 kg/m²     General: Thin, disheveled looking -American male, laying in bed, in no apparent distress, appears older than stated age. Eyes:  EOMI, conjunctivae/corneas clear. HENT: Head normal appearing. Nares normal. Oral mucosa moist. Hearing intact. Neck: Supple, with full range of motion. No gross JVD appreciated. Respiratory:  Normal effort. Clear to auscultation, without rales or wheezes or rhonchi. Cardiovascular: Normal rate, regular rhythm with normal S1/S2 without murmurs. Abdomen: Soft, non-tender, non-distended with normal bowel sounds. Musculoskeletal: No joint swelling or tenderness. Normal tone. No abnormal movements. Skin: Warm and dry. No rashes or lesions. Neurologic:  No focal sensory/motor deficits in the upper or lower extremities. Cranial nerves:  grossly non-focal 2-12. Psychiatric: Alert and oriented, normal insight and thought content. Capillary Refill: Brisk,< 3 seconds. Peripheral Pulses: +2 palpable, equal bilaterally. Labs:   Recent Labs     09/28/22 1000 09/29/22 0446   WBC 6.4 5.9   HGB 11.1* 10.4*   HCT 36.3* 35.7*   * 123*     Recent Labs     09/28/22 1000 09/29/22 0446    137   K 4.6 5.2    101   CO2 27 26   BUN 42* 28*   CREATININE 7.6* 6.6*   CALCIUM 8.5 8.6     Recent Labs     09/28/22 1000 09/29/22 0446   AST 12 9   ALT 11 8*   BILITOT 0.3 0.3   ALKPHOS 95 86     No results for input(s): INR in the last 72 hours.   No results for input(s): Diego Louise in the last 72 hours. No results for input(s): PROCAL in the last 72 hours. Lab Results   Component Value Date/Time    NITRU NEGATIVE 04/03/2018 07:45 PM    WBCUA 2-4 04/03/2018 07:45 PM    BACTERIA NONE 04/03/2018 07:45 PM    RBCUA 5-10 04/03/2018 07:45 PM    BLOODU SMALL 04/03/2018 07:45 PM    SPECGRAV 1.014 03/07/2018 09:10 PM    GLUCOSEU NEGATIVE 04/03/2018 07:45 PM       Radiology (48 hours):  XR CHEST PORTABLE    Result Date: 9/28/2022  1. New right-sided chest tube in place. Diminution in the abnormal density at the right lung base. Small pneumothorax at the right lung base. 2. Abnormal densities in the right and left lower lobes. 3. Cardiomegaly. 4. Aortic stent graft in place. . **This report has been created using voice recognition software. It may contain minor errors which are inherent in voice recognition technology. ** Final report electronically signed by DR Wiley Evans on 9/28/2022 4:55 PM    XR CHEST PORTABLE    Result Date: 9/28/2022  1. Large right pleural effusion with associated compressive atelectasis or right lower lobe collapse. 2. Mild increase groundglass opacities in the left lung are nonspecific but can suggest underlying pulmonary edema or pneumonia. 3. Other findings as described above. **This report has been created using voice recognition software. It may contain minor errors which are inherent in voice recognition technology. ** Final report electronically signed by Dr Tish Luevano on 9/28/2022 10:32 AM       DVT prophylaxis:    [] Lovenox  [] SCDs  [x] SQ Heparin  [] Encourage ambulation   [] Already on Anticoagulation       Diet: ADULT DIET; Regular; 5 carb choices (75 gm/meal); Low Fat/Low Chol/High Fiber/2 gm Na; Low Potassium (Less than 3000 mg/day);  Low Phosphorus (Less than 1000 mg)  ADULT ORAL NUTRITION SUPPLEMENT; Breakfast, Dinner, Lunch; Renal Oral Supplement  Code Status: Full Code  PT/OT: Yes  Tele: Yes  IVF: No    Electronically signed by Pebbles Finch DO on 9/29/2022 at 5:33 PM    Case was discussed with Attending, Dr. Shorty Fletcher

## 2022-09-30 ENCOUNTER — APPOINTMENT (OUTPATIENT)
Dept: CT IMAGING | Age: 57
DRG: 163 | End: 2022-09-30
Payer: MEDICARE

## 2022-09-30 LAB
ANION GAP SERPL CALCULATED.3IONS-SCNC: 10 MEQ/L (ref 8–16)
BUN BLDV-MCNC: 25 MG/DL (ref 7–22)
CALCIUM SERPL-MCNC: 8.9 MG/DL (ref 8.5–10.5)
CHLORIDE BLD-SCNC: 100 MEQ/L (ref 98–111)
CO2: 26 MEQ/L (ref 23–33)
CREAT SERPL-MCNC: 5.5 MG/DL (ref 0.4–1.2)
ERYTHROCYTE [DISTWIDTH] IN BLOOD BY AUTOMATED COUNT: 13.1 % (ref 11.5–14.5)
ERYTHROCYTE [DISTWIDTH] IN BLOOD BY AUTOMATED COUNT: 51.5 FL (ref 35–45)
FOLATE: > 20 NG/ML (ref 4.8–24.2)
GFR SERPL CREATININE-BSD FRML MDRD: 13 ML/MIN/1.73M2
GLUCOSE BLD-MCNC: 113 MG/DL (ref 70–108)
GLUCOSE BLD-MCNC: 114 MG/DL (ref 70–108)
GLUCOSE BLD-MCNC: 72 MG/DL (ref 70–108)
GLUCOSE BLD-MCNC: 90 MG/DL (ref 70–108)
HCT VFR BLD CALC: 37 % (ref 42–52)
HEMOGLOBIN: 10.8 GM/DL (ref 14–18)
MAGNESIUM: 2.3 MG/DL (ref 1.6–2.4)
MCH RBC QN AUTO: 31.4 PG (ref 26–33)
MCHC RBC AUTO-ENTMCNC: 29.2 GM/DL (ref 32.2–35.5)
MCV RBC AUTO: 107.6 FL (ref 80–94)
PHOSPHORUS: 5.2 MG/DL (ref 2.4–4.7)
PLATELET # BLD: 114 THOU/MM3 (ref 130–400)
PMV BLD AUTO: 10.3 FL (ref 9.4–12.4)
POTASSIUM SERPL-SCNC: 5.4 MEQ/L (ref 3.5–5.2)
RBC # BLD: 3.44 MILL/MM3 (ref 4.7–6.1)
SODIUM BLD-SCNC: 136 MEQ/L (ref 135–145)
VITAMIN B-12: 525 PG/ML (ref 211–911)
WBC # BLD: 5.6 THOU/MM3 (ref 4.8–10.8)

## 2022-09-30 PROCEDURE — 6360000002 HC RX W HCPCS: Performed by: PHYSICIAN ASSISTANT

## 2022-09-30 PROCEDURE — 82948 REAGENT STRIP/BLOOD GLUCOSE: CPT

## 2022-09-30 PROCEDURE — 85027 COMPLETE CBC AUTOMATED: CPT

## 2022-09-30 PROCEDURE — 88112 CYTOPATH CELL ENHANCE TECH: CPT

## 2022-09-30 PROCEDURE — 80048 BASIC METABOLIC PNL TOTAL CA: CPT

## 2022-09-30 PROCEDURE — 83735 ASSAY OF MAGNESIUM: CPT

## 2022-09-30 PROCEDURE — 84100 ASSAY OF PHOSPHORUS: CPT

## 2022-09-30 PROCEDURE — 2060000000 HC ICU INTERMEDIATE R&B

## 2022-09-30 PROCEDURE — 6370000000 HC RX 637 (ALT 250 FOR IP): Performed by: INTERNAL MEDICINE

## 2022-09-30 PROCEDURE — 99233 SBSQ HOSP IP/OBS HIGH 50: CPT | Performed by: INTERNAL MEDICINE

## 2022-09-30 PROCEDURE — 71260 CT THORAX DX C+: CPT

## 2022-09-30 PROCEDURE — 36415 COLL VENOUS BLD VENIPUNCTURE: CPT

## 2022-09-30 PROCEDURE — 99232 SBSQ HOSP IP/OBS MODERATE 35: CPT | Performed by: INTERNAL MEDICINE

## 2022-09-30 PROCEDURE — 6370000000 HC RX 637 (ALT 250 FOR IP): Performed by: PHYSICIAN ASSISTANT

## 2022-09-30 PROCEDURE — 6360000002 HC RX W HCPCS: Performed by: INTERNAL MEDICINE

## 2022-09-30 PROCEDURE — 2580000003 HC RX 258: Performed by: PHYSICIAN ASSISTANT

## 2022-09-30 PROCEDURE — 88305 TISSUE EXAM BY PATHOLOGIST: CPT

## 2022-09-30 PROCEDURE — 90935 HEMODIALYSIS ONE EVALUATION: CPT

## 2022-09-30 RX ADMIN — SODIUM CHLORIDE, PRESERVATIVE FREE 10 ML: 5 INJECTION INTRAVENOUS at 08:24

## 2022-09-30 RX ADMIN — CLONIDINE HYDROCHLORIDE 0.3 MG: 0.2 TABLET ORAL at 08:23

## 2022-09-30 RX ADMIN — FERROUS SULFATE TAB 325 MG (65 MG ELEMENTAL FE) 325 MG: 325 (65 FE) TAB at 08:24

## 2022-09-30 RX ADMIN — CLONIDINE HYDROCHLORIDE 0.3 MG: 0.2 TABLET ORAL at 22:12

## 2022-09-30 RX ADMIN — HEPARIN SODIUM 5000 UNITS: 5000 INJECTION INTRAVENOUS; SUBCUTANEOUS at 22:13

## 2022-09-30 RX ADMIN — HYDROCODONE BITARTRATE AND ACETAMINOPHEN 2 TABLET: 5; 325 TABLET ORAL at 23:40

## 2022-09-30 RX ADMIN — HEPARIN SODIUM 5000 UNITS: 5000 INJECTION INTRAVENOUS; SUBCUTANEOUS at 05:42

## 2022-09-30 RX ADMIN — ASPIRIN 81 MG: 81 TABLET, COATED ORAL at 08:23

## 2022-09-30 RX ADMIN — ISOSORBIDE MONONITRATE 120 MG: 60 TABLET, EXTENDED RELEASE ORAL at 08:23

## 2022-09-30 RX ADMIN — Medication 1 TABLET: at 08:23

## 2022-09-30 RX ADMIN — MORPHINE SULFATE 2 MG: 2 INJECTION, SOLUTION INTRAMUSCULAR; INTRAVENOUS at 20:44

## 2022-09-30 RX ADMIN — ATORVASTATIN CALCIUM 40 MG: 40 TABLET, FILM COATED ORAL at 22:12

## 2022-09-30 RX ADMIN — DOXAZOSIN 8 MG: 4 TABLET ORAL at 22:12

## 2022-09-30 RX ADMIN — HYDROCODONE BITARTRATE AND ACETAMINOPHEN 2 TABLET: 5; 325 TABLET ORAL at 08:23

## 2022-09-30 RX ADMIN — FOLIC ACID 1000 MCG: 1 TABLET ORAL at 08:23

## 2022-09-30 RX ADMIN — VERAPAMIL HYDROCHLORIDE 240 MG: 240 TABLET, FILM COATED, EXTENDED RELEASE ORAL at 08:23

## 2022-09-30 RX ADMIN — SODIUM CHLORIDE, PRESERVATIVE FREE 10 ML: 5 INJECTION INTRAVENOUS at 00:30

## 2022-09-30 RX ADMIN — FLUTICASONE PROPIONATE 1 SPRAY: 50 SPRAY, METERED NASAL at 08:22

## 2022-09-30 RX ADMIN — FAMOTIDINE 20 MG: 20 TABLET ORAL at 08:22

## 2022-09-30 RX ADMIN — SERTRALINE 100 MG: 100 TABLET, FILM COATED ORAL at 08:23

## 2022-09-30 RX ADMIN — HYDROCODONE BITARTRATE AND ACETAMINOPHEN 2 TABLET: 5; 325 TABLET ORAL at 16:40

## 2022-09-30 RX ADMIN — SODIUM CHLORIDE, PRESERVATIVE FREE 10 ML: 5 INJECTION INTRAVENOUS at 20:45

## 2022-09-30 ASSESSMENT — PAIN SCALES - GENERAL
PAINLEVEL_OUTOF10: 7
PAINLEVEL_OUTOF10: 4
PAINLEVEL_OUTOF10: 0
PAINLEVEL_OUTOF10: 3
PAINLEVEL_OUTOF10: 3
PAINLEVEL_OUTOF10: 8
PAINLEVEL_OUTOF10: 0
PAINLEVEL_OUTOF10: 7
PAINLEVEL_OUTOF10: 8

## 2022-09-30 ASSESSMENT — PAIN DESCRIPTION - DESCRIPTORS
DESCRIPTORS: ACHING
DESCRIPTORS: ACHING

## 2022-09-30 ASSESSMENT — PAIN - FUNCTIONAL ASSESSMENT
PAIN_FUNCTIONAL_ASSESSMENT: PREVENTS OR INTERFERES SOME ACTIVE ACTIVITIES AND ADLS
PAIN_FUNCTIONAL_ASSESSMENT: PREVENTS OR INTERFERES SOME ACTIVE ACTIVITIES AND ADLS

## 2022-09-30 ASSESSMENT — PAIN DESCRIPTION - LOCATION
LOCATION: RIB CAGE
LOCATION: CHEST;RIB CAGE

## 2022-09-30 ASSESSMENT — PAIN DESCRIPTION - ORIENTATION
ORIENTATION: RIGHT
ORIENTATION: RIGHT

## 2022-09-30 ASSESSMENT — PAIN DESCRIPTION - PAIN TYPE: TYPE: SURGICAL PAIN;ACUTE PAIN

## 2022-09-30 ASSESSMENT — PAIN DESCRIPTION - ONSET: ONSET: ON-GOING

## 2022-09-30 NOTE — FLOWSHEET NOTE
System attempted to clot with 30 min remaining in [place]. Blood returned, no blood loss. Dr. Shahida Villarreal notified received orders for heparin with dialysis. Removed 2 liters of fluid. Tolerated fluid removal well. Pressure held to needle sites times ten minutes each. Dressing clean, dry and intact. Report given to primary RN. Treatment record printed for scanning into EMR.   09/30/22 1145 09/30/22 1552   Vital Signs   BP (!) 102/53 (!) 141/71   Temp 98.1 °F (36.7 °C) 98.3 °F (36.8 °C)   Heart Rate 87 87   Resp 17 20   SpO2  --  97 %   Weight 225 lb 8.5 oz (102.3 kg) 221 lb 1.9 oz (100.3 kg)   Weight Method Bed scale Bed scale   Post-Hemodialysis Assessment   Post-Treatment Procedures  --  Blood returned; Access bleeding time < 10 minutes   Machine Disinfection Process  --  Acid/Vinegar Clean;Heat Disinfect; Exterior Machine Disinfection   Blood Volume Processed (Liters)  --  83.1 l/min   Dialyzer Clearance  --  Lightly streaked   Duration of Treatment (minutes)  --  210 minutes   Heparin Amount Administered During Treatment (mL)  --  0 mL   Hemodialysis Intake (ml)  --  300 ml   Hemodialysis Output (ml)  --  2300 ml   NET Removed (ml)  --  2000   Tolerated Treatment  --  Good

## 2022-09-30 NOTE — PROGRESS NOTES
CRITICAL CARE PROGRESS NOTE      Patient:  Katarzyna Nino    Unit/Bed:4K-14/014-A  YOB: 1965  MRN: [id number]   PCP: Crissy Peres MD  Date of Admission: 9/28/2022  Chief Complaint:- Shortness of breath    Assessment and Plan:    Pleural effusion, right:  CXR demonstrated large loculated right-sided pleural effusion. Chest tube placed 9/28. Initial output 2 L serosanguineous fluid. Lights criteria indicative of exudative effusion, concerning for malignancy given smoking history. Cytology and cell count differential pending. Continue to draw cytology x3 for concentrated yield. Monitor I&Os. CT chest today. Critical care following for chest tube management. Entrapped lung:  Continue with chest tube and continuous suction. Assess how lung reexpands. Acute hypoxic respiratory failure, resolving: Secondary to large loculated right pleural effusion with some concomitant volume overload. Chest tube present. Weaned to 6 L NC. Wean as tolerated for SpO2 > 92%. ESRD: On HD. Complicated with history of FSGS and DM. Nephrology following. HTN: With history of left renal artery stenosis. Continue with doxazosin, isosorbide mononitrate and verapamil. Chronically on high-dose clonidine. Primary following. HLD: On Lipitor. History of thoracic and AAA: with dissection s/p aortic stent graft 7/2018. DM2:  Last HgbA1c 5.0 2/2022. Diet controlled. Primary following  Secondary hyperparathyroidism: Per chart review, secondary to renal failure  REZA: per chart review, not on CPAP. Chronic macrocytic anemia:  Baseline Hgb ~10-11. Stable. No obvious signs of GIB. Depression: On Zoloft  GERD:   On Pepcid  History of substance abuse: Per chart review, including EtOH and cocaine. Encouraged cessation. Tobacco abuse: Counseled on smoking cessation.       INITIAL H AND P AND ICU COURSE:  The patient is a 29-year-old male with a past medical history of HTN, HLD, TAA/AAA s/p aortic stent graft, FSGS, ESRD on HD, secondary hyperparathyroidism, sleep apnea, anemia, GERD, depression, history of substance abuse including cocaine/EtOH and current smoker who presented to [facility] ED for shortness of breath 9/28. The patient reports 3-week history of increased shortness of breath and progressive dyspnea on exertion. This is associated with worsening lower extremity edema. On initial evaluation he was noted to have mild hypoxemia. CXR demonstrated large right pleural effusion with associated compressive atelectasis or right lower lobe collapse. Mildly increased groundglass opacities in left lung. He was placed on BiPAP salvage for increased work of breathing. The patient was admitted to the medical floor and subsequently underwent a chest tube placement for a large right loculated pleural effusion 9/28. Initial output was 2 L of serosanguineous fluid. This was sent for cytology, cell count differential and studies for lights criteria. During hospitalization the patient was weaned down to 6 L NC. Initial pleural fluid analysis was positive for indicative of exudative effusion. Chest output has been decreasing. Past 24 hours:  No significant events overnight. This morning the patient is doing well. He reports improvement in his breathing and shortness of breath. .  He does report some chest pain, associated with chest tube. The patient has been weaned down to 6 L NC. Chest tube output 40 cc overnight, 260 over 24 hrs. CT chest is planned for today. Otherwise hemodynamic stable, vitals WNL. Past Medical History:  Per HPI. Family History:  Positive for HTN. Social History: Active Tobacco Abuse. Prior Cocaine abuse. .    ROS   Denies any N/V, fevers, chills, headache, changes in vision, chest pain, palpitations, abdominal or urinary complaints. Does endorse improving shortness of breath.     Scheduled Meds:   aspirin  81 mg Oral Daily    atorvastatin  40 mg Oral Nightly    cloNIDine  0.3 mg Oral TID doxazosin  8 mg Oral Nightly    famotidine  20 mg Oral Daily    ferrous sulfate  325 mg Oral Daily with breakfast    folic acid  6,595 mcg Oral Daily    fluticasone  1 spray Nasal Daily    isosorbide mononitrate  120 mg Oral Daily    sertraline  100 mg Oral Daily    verapamil  240 mg Oral Daily    multivitamin  1 tablet Oral Daily    sodium chloride flush  10 mL IntraVENous 2 times per day    heparin (porcine)  5,000 Units SubCUTAneous 3 times per day     Continuous Infusions:   sodium chloride         PHYSICAL EXAMINATION:  T: 98.2.  P: 91. RR: 18. B/P: 135/74. O2 Sat:  97% 6L NC. I/O:  net - 660 mL over 24 hrs  Body mass index is 27.39 kg/m². GCS:   15    General: Acute on chronically ill-appearing, interactive and cooperative  HEENT:  normocephalic and atraumatic. No scleral icterus. PERR  Neck: supple. No Thyromegaly. Lungs: Diminished breath sounds in right lower and midlung fields. No wheezes, rales or rhonchi. No retractions  Cardiac: RRR. No JVD. Abdomen: soft. Nontender. Extremities:  No clubbing, cyanosis. + 2 BLE edema  Vasculature: capillary refill < 3 seconds. Palpable dorsalis pedis pulses. Skin:  warm and dry. Psych:  Alert and oriented x3. Affect appropriate  Lymph:  No supraclavicular adenopathy. Neurologic:  No focal deficit. No seizures. Data: (All radiographs, tracings, PFTs, and imaging are personally viewed and interpreted unless otherwise noted). 9/30/22 sodium 136, potassium 5.4, chloride 100, CO2 26, BUN 25, creatinine 5.5, glucose 90, AG 10.0  9/30/22 WBC 5.6, Hgb 10.8, HCT 37.0,   9/29/22 albumin albumin 3.1, alk phos 86, ALT 8, AST 9, bilirubin 0.3, protein 6.5  9/28/22 Pleural fluid; pH 7.91, , protein 3.7, glucose 84, RBC [id number], mononuclear cells 55, polymorphonuclear cells 45, total nucleated cells 180  9/28/22 serum; , protein 7.3, glucose 131  9/28/22 CXR demonstrates new right-sided chest tube in place.   Diminution in the abnormal density at the right lung base. Small pneumothorax at right lung base. Cardiomegaly. Aortic stent graft placed  9/28/22 CXR demonstrates large right pleural effusion with associated compressive atelectasis or right lower lobe collapse. Mildly increased groundglass opacities in left lung. Telemetry shows NSR        Case and plan discussed with Dr. Latanya Dubon. Electronically signed by Ann Hermosillo [address] [place] [address]   Patient seen by me including key components of medical care. Case discussed with resident physician. CT chest shows incomplete lung re-expansion. Consolidation to areas of right lung can obscure mass. Continue with chest tube drainage. Await cytology and flow cytometry of pleural fluid. Italicized font, if present,  represents changes to the note made by me. CC time 20 minutes. Time was discontiguous. Time does not include procedure. Time does include my direct assessment of the patient and coordination of care. Time represents more than 50% of the time involved with patient care by the [address] [place] [address] team.  Electronically signed by Yamilka Garza.  Latanya Dubon MD.

## 2022-09-30 NOTE — PROGRESS NOTES
[facility]   Nephrology progress note  9/30/2022, 10:17 AM      Pt Name:    Esha Christina  MRN:     [id number]     YOB: 1965  Admit Date:    9/28/2022  9:56 AM    Chief Complaint: Nephrology following for ESRD. Subjective:  Patient was seen and examined this morning. Feeling better. On nasal canula. Having chest CT today. Objective:  24HR INTAKE/OUTPUT:    Intake/Output Summary (Last 24 hours) at 9/30/2022 1017  Last data filed at 9/30/2022 0909  Gross per 24 hour   Intake 1460 ml   Output 1660 ml   Net -200 ml         I/O last 3 completed shifts: In: 1400 [P.O.:600]  Out: 4570 [Chest Tube:770]  I/O this shift:   In: 460 [P.O.:460]  Out: -    Admission weight: 250 lb (113.4 kg)  Wt Readings from Last 3 Encounters:   09/30/22 222 lb 3.6 oz (100.8 kg)   09/13/21 250 lb (113.4 kg)   07/26/21 240 lb (108.9 kg)        Vitals :   Vitals:    09/30/22 0027 09/30/22 0530 09/30/22 0636 09/30/22 0800   BP: 114/73 135/74  (!) 157/79   Pulse: 83 91  95   Resp: 18 18  20   Temp: 97.7 °F (36.5 °C) 98.2 °F (36.8 °C)  97.4 °F (36.3 °C)   TempSrc: Axillary Axillary  Oral   SpO2: 100% 97%  97%   Weight:   222 lb 3.6 oz (100.8 kg)    Height:           Physical examination  General Appearance: alert and cooperative with exam, appears comfortable, no distress  Mouth/Throat: Oral mucosa moist  Neck: No JVD  Lungs: diminished  Heart:  S1, S2 heard  GI: soft, non-tender  Extremities: mild ankle edema noted, improved, left arm AV fistula    Medications:  Infusion:    sodium chloride       Meds:    aspirin  81 mg Oral Daily    atorvastatin  40 mg Oral Nightly    cloNIDine  0.3 mg Oral TID    doxazosin  8 mg Oral Nightly    famotidine  20 mg Oral Daily    ferrous sulfate  325 mg Oral Daily with breakfast    folic acid  6,406 mcg Oral Daily    fluticasone  1 spray Nasal Daily    isosorbide mononitrate  120 mg Oral Daily    sertraline  100 mg Oral Daily    verapamil  240 mg Oral Daily    multivitamin 1 tablet Oral Daily    sodium chloride flush  10 mL IntraVENous 2 times per day    heparin (porcine)  5,000 Units SubCUTAneous 3 times per day     Meds prn: hydrOXYzine HCl, traZODone, sodium chloride flush, sodium chloride, ondansetron **OR** ondansetron, polyethylene glycol, acetaminophen, HYDROcodone 5 mg - acetaminophen **OR** HYDROcodone 5 mg - acetaminophen, morphine     Lab Data :  CBC:   Recent Labs     09/28/22  1000 09/29/22 0446 09/30/22  0352   WBC 6.4 5.9 5.6   HGB 11.1* 10.4* 10.8*   HCT 36.3* 35.7* 37.0*   * 123* 114*     CMP:  Recent Labs     09/28/22  1000 09/29/22 0446 09/29/22 2038 09/30/22  0352    137  --  136   K 4.6 5.2  --  5.4*    101  --  100   CO2 27 26  --  26   BUN 42* 28*  --  25*   CREATININE 7.6* 6.6*  --  5.5*   GLUCOSE 131* 114*  --  90   CALCIUM 8.5 8.6  --  8.9   MG  --  2.3  --  2.3   PHOS  --   --  4.7 5.2*     Hepatic:   Recent Labs     09/28/22 1000 09/29/22 0446   LABALBU 3.8 3.1*   AST 12 9   ALT 11 8*   BILITOT 0.3 0.3   ALKPHOS 95 86         Assessment and Plan:    1. ESRD on HD TTS  -had extra treatment on admission for UF  -will dialyze on MWF while inpatient d/t staffing so he will have HD again today    2. Mild hyperkalemia : HD today    3. Acute hypoxic/hypercapneic resp failure  4. Pulmonary edema  5. Right pleural effusion s/p chest tube  6. Anemia in CKD, hgb at goal  7. Thrombocytopenia  8. DM  9. Secondary hyperparathyroidism    D/W patient     Lianaumm Barillaspina, DO  [facility]    This report has been created using voice recognition software.  It may contain minor errors which are inherent in voice recognition technology

## 2022-09-30 NOTE — CARE COORDINATION
Collaborative Discharge Planning    Mckayla Zimmer  :  1965  MRN:  [id number]    ADMIT DATE:  2022      Discharge Planning Discharge Planning  Type of Residence: House  Living Arrangements: Spouse/Significant Other  Support Systems: Spouse/Significant Other  Current Services Prior To Admission: None  Potential Assistance Needed: N/A  DME Ordered?: No  Potential Assistance Purchasing Medications: Yes  Type of Home Care Services: None  Patient expects to be discharged to[de-identified] House  Follow Up Appointment: Best Day/Time : Monday AM  One/Two Story Residence: [facility]  # of Interior Steps: 12  Height of Each Step (in): 6.5 Bizo  Interior Rails: Right  Lift Chair Available: No  History of falls?: No  White Board Notes /Social Work Whiteboard Notes  /Social Work Whiteboard: ; CM; plans home w spouse Caffie Parent, has HD T-R-S w L [address] T-R-S w AVF    Discharge [address] with family  Denied needs as plans home w spouse Meloniefie Parent independently as PTA when medically cleared, current w [facility] M-T-F 6422 chair time; B/W Cab transports to HD; has AVF; therapy following  Discharge Milestones and Delays: Clinical status    ESRD/Loculated Right Pleural Effusion/Pneumothorax    Right Chest Tube Output = 260 ml/24h    Creatinine 5.5,     22, 12:53 PM EDT    Patient goals/plan/ treatment preferences discussed by  and . Patient goals/plan/ treatment preferences reviewed with patient/ family. Patient/ family verbalize understanding of discharge plan and are in agreement with goal/plan/treatment preferences. Understanding was demonstrated using the teach back method. AVS provided by RN at time of discharge, which includes all necessary medical information pertaining to the patients current course of illness, treatment, post-discharge goals of care, and treatment preferences.      Services At/After Discharge: None       IMM Letter  IMM Letter given to Patient/Family/Significant other/Guardian/POA/by[de-identified] Kosta Jeter RN   IMM Letter date given[de-identified] 09/30/22  IMM Letter time given[de-identified] 1192          SIGNED:  Elisha Mendoza RN   9/30/2022, 12:47 PM

## 2022-09-30 NOTE — FLOWSHEET NOTE
09/30/22 1010   Safe Environment   Safety Measures Other (comment)  (Virtual nurse rounding complete)   Patient alert up in chair at this time. Family and staff at bedside at this time.

## 2022-09-30 NOTE — PROGRESS NOTES
Comprehensive Nutrition Assessment    Type and Reason for Visit:  Reassess, Patient Education    Nutrition Recommendations/Plan:   Continue current diet. Po intake % noted. Discontinue Nepro TID. Left written diet education on renal carb controlled diet in pt's room. Pt N/A x 2, having multiple procedures today per rounds. Will revisit as able. Malnutrition Assessment:  Malnutrition Status: At risk for malnutrition (Comment) (09/30/22 1145)    Context:  Acute Illness     Findings of the 6 clinical characteristics of malnutrition:  Energy Intake:  No significant decrease in energy intake  Weight Loss:  No significant weight loss (compared to usual wt ( admit wt had + 2 edema))     Body Fat Loss:  No significant body fat loss     Muscle Mass Loss:  Unable to assess    Fluid Accumulation:  Unable to assess     Strength:  Not Performed    Nutrition Assessment:     Pt. nutritionally improving AEB intake % multiple meals however K+ 5.4 ( HD pt)  At risk for further nutrition compromise r/t admit 9/28-presented with SOB; large right sided pleural effusion- R chest tube placed 9/28; respiratory acidosis; and underlying medical condition (PMH: cocaine abuse, DM, ESRD on HD). Nutrition Related Findings:    Pt. Report/Treatments/Miscellaneous: Pt N/A x 2 . Intake good %. Pt not a new dialysis pt & receives OP HD treatments here per  Yariel.   GI Status: No BM documented  ( LOS day2)   Pertinent Labs: (9/30) K+ 5.4, BUN 25, Creatinine 5.5, Phos 5.2, Hemoglobin 10.8  Pertinent Meds:  Iron 325, Folvite    Wound Type: None       Current Nutrition Intake & Therapies:    Average Meal Intake:  (% multiple meals)  Neqpro TID discontinued (9/30)  Diet: 5 carb choices, lowfat/low cholesterol high fiber low sodium, low potassium, low phosphorus  Anthropometric Measures:  Height: 6' 3\" (190.5 cm)  Ideal Body Weight (IBW): 196 lbs (89 kg)    Admission Body Weight: 231 lb 9.6 oz (105.1 kg) (9/28: +2 pitting edema)  Current Body Weight: 222 lb 3.6 oz (100.8 kg) ((9/30) edema N/A),   IBW. Weight Source: Bed Scale  Current BMI (kg/m2): 27.8  Usual Body Weight:  (UBW ~230 lbs; wt hx per EMR: 1/28/20: 227 lbs 15.3 oz, 2/26/20: 236 lbs 8.9 oz, 7/7/20: 225 lbs 4.8 oz, 9/15/20: 228 lbs 3.2 oz, 9/17/20: 214 lbs 15.2 oz, 9/28/22: 217 lbs 9.5 oz)                       BMI Categories: Overweight (BMI 25.0-29. 9)    Estimated Daily Nutrient Needs:  Energy Requirements Based On: Kcal/kg  Weight Used for Energy Requirements:  ((9/29))  Energy (kcal/day): 2938-5233HKVTT (20-25kcals/kgm)  Weight Used for Protein Requirements: Ideal  Protein (g/day): 107-134 grams (1.2-1.5 grams protein/kgm IBW)     Fluid (ml/day):  per physician ( HD pt)    Nutrition Diagnosis:   Inadequate protein-energy intake related to acute injury/trauma as evidenced by poor intake prior to admission (wife reports decreased intake x2-3 weeks d/t SOB and not feeling well.) However, pt appears to be consuming % multiple meals here therefore appears to be improving. Nutrition Interventions:   Food and/or Nutrient Delivery: Continue Current Diet, Discontinue Oral Nutrition Supplement  Nutrition Education/Counseling: Education initiated (left written renal carb controlled diet materials in pt's room. Pt N/A x 2  & to have multiple procedures today per rounds.  (9/30))  Coordination of Nutrition Care: Continue to monitor while inpatient       Goals:     Goals: PO intake 75% or greater, by next RD assessment       Nutrition Monitoring and Evaluation:      Food/Nutrient Intake Outcomes: Food and Nutrient Intake, Supplement Intake  Physical Signs/Symptoms Outcomes: Biochemical Data, GI Status, Fluid Status or Edema, Weight, Skin, Nutrition Focused Physical Findings, Meal Time Behavior    Discharge Planning:    Continue current diet     Marleny Baig RD, LD  Contact: [phone]

## 2022-09-30 NOTE — PROGRESS NOTES
Internal Medicine Resident Progress Note    Patient:  Esha Christina    YOB: 1965  Unit/Bed:4K-14/014-A  MRN: [id number]    Acct: [de-identified]   PCP: Nilda Madrid MD    Date of Admission: 9/28/2022      Assessment/Plan:  Acute hypoxic respiratory failure-improved:  -Patient was satting 67% on room air upon presentation to ED  -Was placed on BiPAP  -#2 below right pleural effusion seen on CXR  Right pleural effusion: Seen on CXR. -Chest tube placed 9/28. Fluid serosanguineous.  -Concern for malignancy.  -Fluid consistent with exudate, had 225K RBC, 55% mononuclear cells, 45% PMN. Total cells 180. No malignant cells seen. 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 This seen on CT 3 years ago but appears to have worsened.  -We will consult cardiothoracic surgery. Entrapped lung: Continue with chest tube/continuous suction  -CT scan to be performed today. 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  -Continue dialysis in hospital  DM2: Dietary restriction  -Monitor BG daily  HTN: Continue home clonidine, doxazosin, Imdur  HFpEF: Echo dated 3/10/2020 showed EF 60%, severe concentric LVH, moderate systolic anterior motion of anterior leaflet, moderate LVOT obstruction.  -Awaiting 9/29 echo result  -Continue CALAN SR,  PTSD: Continue home sertraline  Poly-substance abuse: encourage cessation  Hyperlipidemia: Continue home Lipitor  Anemia of chronic disease: On chronic iron supplementation.   Does not require erythropoietin at this time.  -Continue iron 325 every morning with breakfast  DVT PPx: Heparin    Chronic Conditions (reviewed and stable unless otherwise stated)      Expected discharge date: TBD    Disposition:   [x] Home  [] TCU  [] Rehab  [] Psych  [] SNF  [] [place]  [] Other-    ===================================================================      Chief Complaint: Large loculated pleural effusion    Hospital Course: Patient Dulce Hardy is a 77-year-old -American male with a PMHx of AAA, HTN, LVH, ESRD on HD, DM 2, hyperparathyroidism, and polysubstance abuse who presented to [facility] ED with cc: Shortness of breath which has been ongoing for the past month to month and a half. Patient has orthopnea and dyspnea on exertion. He also complains of lower extremity edema which she is also had for the past month. He reports some abdominal distention. He says that he has been compliant with his dialysis and medications, and thought that they were not diuresing him enough in the dialysis center. He states that his dyspnea has become so bad that he cannot walk 10 feet without becoming winded and having to sit down. Patient was hypoxic on arrival he was placed on BiPAP with some improvement. He was taken to x-ray which showed a large right pleural effusion and edema. He was then admitted to the floor at 1:00 on 9/28. Patient was taken to room 4K14 where Dr. Alberto Dewey placed a 14 La[place] chest tube at approximately 1700 that evening with minimal blood loss. 9/29: Patient states that he feels significantly better today. He says he is able to breathe and with the exception of irritation at the chest tube site, he feels like he has \"super lungs\". He does complain about pain at the chest tube site but he says it is not terrible. Subjective (past 24 hours): 9/30: Patient had CT done this morning. He was then taken to dialysis. He has no real complaints today. States that he is prepared for the worst when it comes to the status of his lung diagnosis. ROS: reviewed complete ROS unchanged unless otherwise stated in hospital course/subjective portion.      Medications:  Reviewed    Infusion Medications    sodium chloride       Scheduled Medications    aspirin  81 mg Oral Daily    atorvastatin  40 mg Oral Nightly    cloNIDine  0.3 mg Oral TID    doxazosin  8 mg Oral Nightly    famotidine  20 mg Oral Daily    ferrous sulfate  325 mg Oral Daily with breakfast    folic acid  9,536 mcg Oral Daily    fluticasone  1 spray Nasal Daily    isosorbide mononitrate  120 mg Oral Daily    sertraline  100 mg Oral Daily    verapamil  240 mg Oral Daily    multivitamin  1 tablet Oral Daily    sodium chloride flush  10 mL IntraVENous 2 times per day    heparin (porcine)  5,000 Units SubCUTAneous 3 times per day     PRN Meds: hydrOXYzine HCl, traZODone, sodium chloride flush, sodium chloride, ondansetron **OR** ondansetron, polyethylene glycol, acetaminophen, HYDROcodone 5 mg - acetaminophen **OR** HYDROcodone 5 mg - acetaminophen, morphine        Intake/Output Summary (Last 24 hours) at 9/30/2022 1718  Last data filed at 9/30/2022 1552  Gross per 24 hour   Intake 760 ml   Output 2340 ml   Net -1580 ml       Exam:  BP (!) 141/71   Pulse 87   Temp 98.3 °F (36.8 °C)   Resp 22   Ht 6' 3\" (1.905 m)   Wt 221 lb 1.9 oz (100.3 kg)   SpO2 97%   BMI 27.64 kg/m²     General: Thin, older -American male, sitting on chair eating breakfast, in no distress, appears stated age. Eyes:  PERRL. Conjunctivae/corneas clear. HENT: Head normal appearing. Nares normal. Oral mucosa moist.  Hearing intact. Neck: Supple, with full range of motion. Trachea midline. No gross JVD appreciated. Respiratory:  Normal effort. Clear to auscultation, without rales or wheezes or rhonchi. Cardiovascular: Normal rate, regular rhythm with normal S1/S2 without murmurs. Abdomen: Soft, non-tender, non-distended with normal bowel sounds. Musculoskeletal: No joint swelling or tenderness. Normal tone. No abnormal movements. Skin: Warm and dry. No rashes or lesions. Neurologic:  No focal sensory/motor deficits in the upper or lower extremities. Cranial nerves:  grossly non-focal 2-12. Psychiatric: Alert and oriented, normal insight and thought content. Capillary Refill: Brisk,< 3 seconds. Peripheral Pulses: +2 palpable, equal bilaterally. Labs:   Recent Labs     09/28/22 1000 09/29/22 0446 09/30/22  0352   WBC 6.4 5.9 5.6   HGB 11.1* 10.4* 10.8*   HCT 36.3* 35.7* 37.0*   * 123* 114*     Recent Labs     09/28/22  1000 09/29/22 0446 09/29/22 2038 09/30/22  0352    137  --  136   K 4.6 5.2  --  5.4*    101  --  100   CO2 27 26  --  26   BUN 42* 28*  --  25*   CREATININE 7.6* 6.6*  --  5.5*   CALCIUM 8.5 8.6  --  8.9   PHOS  --   --  4.7 5.2*     Recent Labs     09/28/22 1000 09/29/22 0446   AST 12 9   ALT 11 8*   BILITOT 0.3 0.3   ALKPHOS 95 86     No results for input(s): INR in the last 72 hours. No results for input(s): Anahy [place] in the last 72 hours. No results for input(s): PROCAL in the last 72 hours. Lab Results   Component Value Date/Time    NITRU NEGATIVE 04/03/2018 07:45 PM    WBCUA 2-4 04/03/2018 07:45 PM    BACTERIA NONE 04/03/2018 07:45 PM    RBCUA 5-10 04/03/2018 07:45 PM    BLOODU SMALL 04/03/2018 07:45 PM    SPECGRAV 1.014 03/07/2018 09:10 PM    GLUCOSEU NEGATIVE 04/03/2018 07:45 PM       Radiology (48 hours):  CT CHEST W CONTRAST    Result Date: 9/30/2022  1. A small right pneumothorax with a pigtail catheter catheter within it. Small residual right pleural effusion. 2. Status post endograft placement within the thoracic aorta. 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 A CT angiography of the chest without and with contrast  is recommended to better evaluate whether these represent calcifications or endoleak. 3. Masslike abnormalities are seen in the right lower lobe and right middle lobe that represent rounded atelectasis. 4. Other findings as described above. **This report has been created using voice recognition software.   It may contain minor errors which are inherent in voice recognition technology. ** Final report electronically signed by Dr Awa Coronado on 9/30/2022 10:27 AM       DVT prophylaxis:    [] Lovenox  [] SCDs  [x] SQ Heparin  [] Encourage ambulation   [] Already on Anticoagulation       Diet: ADULT DIET; Regular; 5 carb choices (75 gm/meal); Low Fat/Low Chol/High Fiber/2 gm Na; Low Potassium (Less than 3000 mg/day);  Low Phosphorus (Less than 1000 mg)  Code Status: Full Code  PT/OT: Yes  Tele: Yes  IVF: No    Electronically signed by Keren Crouch DO on 9/30/2022 at 5:18 PM    Case was discussed with Attending, Dr. Tor Barrera

## 2022-10-01 ENCOUNTER — APPOINTMENT (OUTPATIENT)
Dept: GENERAL RADIOLOGY | Age: 57
DRG: 163 | End: 2022-10-01
Payer: MEDICARE

## 2022-10-01 LAB
ANION GAP SERPL CALCULATED.3IONS-SCNC: 12 MEQ/L (ref 8–16)
BUN BLDV-MCNC: 30 MG/DL (ref 7–22)
CALCIUM SERPL-MCNC: 8.9 MG/DL (ref 8.5–10.5)
CHLORIDE BLD-SCNC: 101 MEQ/L (ref 98–111)
CO2: 24 MEQ/L (ref 23–33)
CREAT SERPL-MCNC: 5.7 MG/DL (ref 0.4–1.2)
ERYTHROCYTE [DISTWIDTH] IN BLOOD BY AUTOMATED COUNT: 12.7 % (ref 11.5–14.5)
ERYTHROCYTE [DISTWIDTH] IN BLOOD BY AUTOMATED COUNT: 50.6 FL (ref 35–45)
GFR SERPL CREATININE-BSD FRML MDRD: 13 ML/MIN/1.73M2
GLUCOSE BLD-MCNC: 93 MG/DL (ref 70–108)
GLUCOSE BLD-MCNC: 95 MG/DL (ref 70–108)
HCT VFR BLD CALC: 36.6 % (ref 42–52)
HEMOGLOBIN: 10.7 GM/DL (ref 14–18)
MAGNESIUM: 2.3 MG/DL (ref 1.6–2.4)
MCH RBC QN AUTO: 31.8 PG (ref 26–33)
MCHC RBC AUTO-ENTMCNC: 29.2 GM/DL (ref 32.2–35.5)
MCV RBC AUTO: 108.9 FL (ref 80–94)
PHOSPHORUS: 5.1 MG/DL (ref 2.4–4.7)
PLATELET # BLD: 125 THOU/MM3 (ref 130–400)
PMV BLD AUTO: 10.7 FL (ref 9.4–12.4)
POTASSIUM SERPL-SCNC: 5.1 MEQ/L (ref 3.5–5.2)
RBC # BLD: 3.36 MILL/MM3 (ref 4.7–6.1)
SODIUM BLD-SCNC: 137 MEQ/L (ref 135–145)
WBC # BLD: 5 THOU/MM3 (ref 4.8–10.8)

## 2022-10-01 PROCEDURE — 84100 ASSAY OF PHOSPHORUS: CPT

## 2022-10-01 PROCEDURE — 83735 ASSAY OF MAGNESIUM: CPT

## 2022-10-01 PROCEDURE — 6360000002 HC RX W HCPCS: Performed by: PHYSICIAN ASSISTANT

## 2022-10-01 PROCEDURE — 93005 ELECTROCARDIOGRAM TRACING: CPT

## 2022-10-01 PROCEDURE — 71045 X-RAY EXAM CHEST 1 VIEW: CPT

## 2022-10-01 PROCEDURE — 82948 REAGENT STRIP/BLOOD GLUCOSE: CPT

## 2022-10-01 PROCEDURE — 85027 COMPLETE CBC AUTOMATED: CPT

## 2022-10-01 PROCEDURE — 2580000003 HC RX 258: Performed by: PHYSICIAN ASSISTANT

## 2022-10-01 PROCEDURE — 2700000000 HC OXYGEN THERAPY PER DAY

## 2022-10-01 PROCEDURE — 36415 COLL VENOUS BLD VENIPUNCTURE: CPT

## 2022-10-01 PROCEDURE — 6370000000 HC RX 637 (ALT 250 FOR IP): Performed by: INTERNAL MEDICINE

## 2022-10-01 PROCEDURE — 80048 BASIC METABOLIC PNL TOTAL CA: CPT

## 2022-10-01 PROCEDURE — 99232 SBSQ HOSP IP/OBS MODERATE 35: CPT | Performed by: INTERNAL MEDICINE

## 2022-10-01 PROCEDURE — 6370000000 HC RX 637 (ALT 250 FOR IP): Performed by: PHYSICIAN ASSISTANT

## 2022-10-01 PROCEDURE — 2060000000 HC ICU INTERMEDIATE R&B

## 2022-10-01 PROCEDURE — 94660 CPAP INITIATION&MGMT: CPT

## 2022-10-01 PROCEDURE — 99291 CRITICAL CARE FIRST HOUR: CPT | Performed by: INTERNAL MEDICINE

## 2022-10-01 RX ADMIN — HEPARIN SODIUM 5000 UNITS: 5000 INJECTION INTRAVENOUS; SUBCUTANEOUS at 20:25

## 2022-10-01 RX ADMIN — ATORVASTATIN CALCIUM 40 MG: 40 TABLET, FILM COATED ORAL at 20:25

## 2022-10-01 RX ADMIN — HYDROCODONE BITARTRATE AND ACETAMINOPHEN 2 TABLET: 5; 325 TABLET ORAL at 19:33

## 2022-10-01 RX ADMIN — FERROUS SULFATE TAB 325 MG (65 MG ELEMENTAL FE) 325 MG: 325 (65 FE) TAB at 08:25

## 2022-10-01 RX ADMIN — ASPIRIN 81 MG: 81 TABLET, COATED ORAL at 08:25

## 2022-10-01 RX ADMIN — ISOSORBIDE MONONITRATE 120 MG: 60 TABLET, EXTENDED RELEASE ORAL at 08:24

## 2022-10-01 RX ADMIN — Medication 1 TABLET: at 08:24

## 2022-10-01 RX ADMIN — FLUTICASONE PROPIONATE 1 SPRAY: 50 SPRAY, METERED NASAL at 08:24

## 2022-10-01 RX ADMIN — HEPARIN SODIUM 5000 UNITS: 5000 INJECTION INTRAVENOUS; SUBCUTANEOUS at 14:34

## 2022-10-01 RX ADMIN — FOLIC ACID 1000 MCG: 1 TABLET ORAL at 08:24

## 2022-10-01 RX ADMIN — CLONIDINE HYDROCHLORIDE 0.3 MG: 0.2 TABLET ORAL at 20:26

## 2022-10-01 RX ADMIN — CLONIDINE HYDROCHLORIDE 0.3 MG: 0.2 TABLET ORAL at 08:25

## 2022-10-01 RX ADMIN — SERTRALINE 100 MG: 100 TABLET, FILM COATED ORAL at 08:25

## 2022-10-01 RX ADMIN — VERAPAMIL HYDROCHLORIDE 240 MG: 240 TABLET, FILM COATED, EXTENDED RELEASE ORAL at 08:24

## 2022-10-01 RX ADMIN — FAMOTIDINE 20 MG: 20 TABLET ORAL at 08:25

## 2022-10-01 RX ADMIN — HEPARIN SODIUM 5000 UNITS: 5000 INJECTION INTRAVENOUS; SUBCUTANEOUS at 06:26

## 2022-10-01 RX ADMIN — DOXAZOSIN 8 MG: 4 TABLET ORAL at 20:25

## 2022-10-01 RX ADMIN — SODIUM CHLORIDE, PRESERVATIVE FREE 10 ML: 5 INJECTION INTRAVENOUS at 20:25

## 2022-10-01 RX ADMIN — SODIUM CHLORIDE, PRESERVATIVE FREE 10 ML: 5 INJECTION INTRAVENOUS at 08:24

## 2022-10-01 ASSESSMENT — PAIN SCALES - GENERAL
PAINLEVEL_OUTOF10: 7
PAINLEVEL_OUTOF10: 0

## 2022-10-01 ASSESSMENT — PAIN - FUNCTIONAL ASSESSMENT: PAIN_FUNCTIONAL_ASSESSMENT: ACTIVITIES ARE NOT PREVENTED

## 2022-10-01 ASSESSMENT — PAIN DESCRIPTION - ORIENTATION: ORIENTATION: RIGHT

## 2022-10-01 ASSESSMENT — PAIN DESCRIPTION - PAIN TYPE: TYPE: ACUTE PAIN

## 2022-10-01 ASSESSMENT — PAIN DESCRIPTION - DESCRIPTORS: DESCRIPTORS: SORE

## 2022-10-01 ASSESSMENT — PAIN DESCRIPTION - LOCATION: LOCATION: CHEST

## 2022-10-01 ASSESSMENT — PAIN DESCRIPTION - FREQUENCY: FREQUENCY: INTERMITTENT

## 2022-10-01 ASSESSMENT — PAIN DESCRIPTION - ONSET: ONSET: ON-GOING

## 2022-10-01 NOTE — PROGRESS NOTES
Intensive Care Unit  Cardiac Step Down Unit  Attending Progress Note      Team members present on rounds:  Nursing and Patient    ICU guidelines/prophylaxis active for the following:    head above bed above 30 degrees, insulin guidelines, DVT prophylaxis, and ulcer prophylaxis    Patient Examined? yes    Additions/differences/highlights to the resident's/fellow's exam:  VITALS:  BP (!) 103/56   Pulse 80   Temp 98.8 °F (37.1 °C) (Oral)   Resp 18   Ht 6' 3\" (1.905 m)   Wt 221 lb 1.9 oz (100.3 kg)   SpO2 98%   BMI 27.64 kg/m²   MAXIMUM TEMPERATURE OVER 24HRS:  Temp (24hrs), Av.6 °F (37 °C), Min:97.7 °F (36.5 °C), Max:99 °F (37.2 °C)   CURRENT PULSE OXIMETRY:  SpO2: 98 %  24HR PULSE OXIMETRY RANGE:  SpO2  Av.9 %  Min: 93 %  Max: 99 %  24HR INTAKE/OUTPUT:    Intake/Output Summary (Last 24 hours) at 10/1/2022 1913  Last data filed at 10/1/2022 1543  Gross per 24 hour   Intake 1610 ml   Output 185 ml   Net 1425 ml     CONSTITUTIONAL:  awake, alert, cooperative, mild distress, and morbidly obese  EYES:  Lids and lashes normal, pupils equal, round and reactive to light, extra ocular muscles intact, sclera clear, conjunctiva normal  HEENT:  Normocepalic, without obvious abnormality  NECK:  supple, symmetrical, trachea midline,, no adenopathy, thyroid not enlarge, symmetric, no tenderness, no carotid bruits, and jugular venous distention present 7 cm above sternal notch  HEMATOLOGIC/LYMPHATICS: No lymph nodes enlargement noted in the neck  BACK:  range of motion limited because of morbid obesity and shortness of breath  LUNGS:  Mild respiratory distress and diminished breath sounds diffuse  CARDIOVASCULAR: Distant heart sounds. ABDOMEN:  normal bowel sounds, distended, and full  CHEST/BREASTS:  Breasts symmetrical, skin without lesion(s), no nipple retraction or dimpling, no nipple discharge, no masses palpated, no axillary or supraclavicular adenopathy  GENITAL/URINARY: Deferred.   MUSCULOSKELETAL:  there is no redness, warmth, or swelling of the joints  NEUROLOGIC:  awake  alert  sensory intact  Motor Exam: Nonfocal, generalized weakness  SKIN:  No bruising or bleeding. Normal skin color, texture, and turgor. No redness, warmth, or swelling. No rashes, lesions, or abnormal moles. DATA:  CBC:   Lab Results   Component Value Date/Time    WBC 5.0 10/01/2022 04:06 AM    RBC 3.36 10/01/2022 04:06 AM    RBC 5.03 02/24/2012 03:50 PM    HGB 10.7 10/01/2022 04:06 AM    HCT 36.6 10/01/2022 04:06 AM    .9 10/01/2022 04:06 AM    RDW 15.0 04/03/2018 08:12 PM     10/01/2022 04:06 AM     CMP:    Lab Results   Component Value Date/Time     10/01/2022 04:06 AM    K 5.1 10/01/2022 04:06 AM    K 5.2 09/29/2022 04:46 AM     10/01/2022 04:06 AM    CO2 24 10/01/2022 04:06 AM    BUN 30 10/01/2022 04:06 AM    CREATININE 5.7 10/01/2022 04:06 AM    LABGLOM 13 10/01/2022 04:06 AM    GLUCOSE 95 10/01/2022 04:06 AM    GLUCOSE 104 02/16/2012 11:20 AM    PROT 6.5 09/29/2022 04:46 AM    CALCIUM 8.9 10/01/2022 04:06 AM    BILITOT 0.3 09/29/2022 04:46 AM    ALKPHOS 86 09/29/2022 04:46 AM    AST 9 09/29/2022 04:46 AM    ALT 8 09/29/2022 04:46 AM       KEY ISSUES/FINDINGS/ASSESSMENT AND PLAN:  53-year-old male with a past medical history of HTN, HLD, TAA/AAA s/p aortic stent graft, FSGS, ESRD on HD, secondary hyperparathyroidism, sleep apnea, anemia, GERD, depression, history of substance abuse including cocaine/EtOH and current smoker who presented to [facility] ED for shortness of breath 9/28. The patient reports 3-week history of increased shortness of breath and progressive dyspnea on exertion. This is associated with worsening lower extremity edema. On initial evaluation he was noted to have mild hypoxemia. CXR demonstrated large right pleural effusion with associated compressive atelectasis or right lower lobe collapse. Mildly increased groundglass opacities in left lung.   He was placed on BiPAP salvage for increased work of breathing. The patient was admitted to the medical floor and subsequently underwent a chest tube placement for a large right loculated pleural effusion 9/28. Initial output was 2 L of serosanguineous fluid. This was sent for cytology, cell count differential and studies for lights criteria. During hospitalization the patient was weaned down to 6 L NC. Initial pleural fluid analysis was positive for indicative of exudative effusion. Chest output has been decreasing. No significant events overnight. This morning the patient is doing well. He reports improvement in his breathing and shortness of breath. .  He does report some chest pain, associated with chest tube. The patient has been weaned down to 6 L NC.   CT chest done 09/30/2022:   1-A small right pneumothorax with a pigtail catheter catheter within it. Small residual right pleural effusion. 2. Status post endograft placement within the thoracic aorta. 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 A CT angiography of the chest without and with contrast     is recommended to better evaluate whether these represent calcifications or endoleak. 3. Masslike abnormalities are seen in the right lower lobe and right middle lobe that represent rounded atelectasis. Pleural effusion, right:  CXR demonstrated large loculated right-sided pleural effusion. Chest tube placed 9/28. Initial output 2 L serosanguineous fluid. Lights criteria indicative of exudative effusion, no malignancy noted per cytology. Negative microbiology. Critical care following for chest tube management. Chest x-ray a.m. and evaluate for possible removal of chest tube. Entrapped lung:  Continue with chest tube and continuous suction. Assess how lung reexpands.    Acute hypoxic respiratory failure, resolving: Secondary to large loculated right pleural effusion with some concomitant volume overload. Chest tube present. Weaned to 6 L NC. Wean as tolerated for SpO2 > 92%. ESRD: On HD. Complicated with history of FSGS and DM. Nephrology following. HTN: With history of left renal artery stenosis. Continue with doxazosin, isosorbide mononitrate and verapamil. Chronically on high-dose clonidine. Primary following. HLD: On Lipitor. History of thoracic and AAA: with dissection s/p aortic stent graft 7/2018. 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 on all Monday morning. DM2:  Last HgbA1c 5.0 2/2022. Diet controlled. Primary following  Secondary hyperparathyroidism: Per chart review, secondary to renal failure  REZA: per chart review, not on CPAP. Chronic macrocytic anemia:  Baseline Hgb ~10-11. Stable. No obvious signs of GIB. Depression: On Zoloft  GERD:   On Pepcid  History of substance abuse: Per chart review, including EtOH and cocaine. Encouraged cessation. Tobacco abuse: Counseled on smoking cessation. Critical condition, guarded prognosis.   CC time 37 min apart from procedures

## 2022-10-01 NOTE — PLAN OF CARE
Problem: Discharge Planning  Goal: Discharge to home or other facility with appropriate resources  10/1/2022 1328 by Yajaira German RN  Outcome: Progressing  Flowsheets (Taken 10/1/2022 1328)  Discharge to home or other facility with appropriate resources:   Arrange for needed discharge resources and transportation as appropriate   Identify barriers to discharge with patient and caregiver  10/1/2022 0640 by Felicita Riley RN  Outcome: Progressing     Problem: Pain  Goal: Verbalizes/displays adequate comfort level or baseline comfort level  10/1/2022 1328 by Yajaira German RN  Outcome: Progressing  Flowsheets (Taken 10/1/2022 1328)  Verbalizes/displays adequate comfort level or baseline comfort level:   Encourage patient to monitor pain and request assistance   Assess pain using appropriate pain scale  Note: Pain Assessment: None - Denies Pain  Pain Level: 0   Patient's Stated Pain Goal: 0 - No pain   Is pain goal met at this time?   Yes     Non-Pharmaceutical Pain Intervention(s): Family support   10/1/2022 0640 by Felicita Riley RN  Outcome: Progressing     Problem: Safety - Adult  Goal: Free from fall injury  10/1/2022 1328 by Yajaira German RN  Outcome: Progressing  [address] (Taken 10/1/2022 1328)  Free From Fall Injury: Instruct family/caregiver on patient safety  10/1/2022 0640 by Felicita Riley RN  Outcome: Progressing     Problem: ABCDS Injury Assessment  Goal: Absence of physical injury  Outcome: Progressing  Flowsheets (Taken 10/1/2022 1328)  Absence of Physical Injury: Implement safety measures based on patient assessment     Problem: Respiratory - Adult  Goal: Achieves optimal ventilation and oxygenation  10/1/2022 1328 by Yajaira German RN  Outcome: Progressing  Flowsheets (Taken 10/1/2022 1328)  Achieves optimal ventilation and oxygenation:   Assess for changes in respiratory status   Assess for changes in mentation and behavior  10/1/2022 0640 by Felicita Riley RN  Outcome: Progressing     Problem: Cardiovascular - Adult  Goal: Maintains optimal cardiac output and hemodynamic stability  Outcome: Progressing  Flowsheets (Taken 10/1/2022 1328)  Maintains optimal cardiac output and hemodynamic stability:   Monitor blood pressure and heart rate   Monitor urine output and notify Licensed Independent Practitioner for values outside of normal range  Goal: Absence of cardiac dysrhythmias or at baseline  Outcome: Progressing  Flowsheets (Taken 10/1/2022 1328)  Absence of cardiac dysrhythmias or at baseline:   Monitor cardiac rate and rhythm   Assess for signs of decreased cardiac output     Problem: Skin/Tissue Integrity - Adult  Goal: Skin integrity remains intact  Outcome: Progressing  Flowsheets (Taken 10/1/2022 1328)  Skin Integrity Remains Intact:   Monitor for areas of redness and/or skin breakdown   Assess vascular access sites hourly     Problem: Infection - Adult  Goal: Absence of infection at discharge  Outcome: Progressing  Flowsheets (Taken 10/1/2022 1328)  Absence of infection at discharge:   Assess and monitor for signs and symptoms of infection   Monitor lab/diagnostic results  Goal: Absence of infection during hospitalization  Outcome: Progressing  Flowsheets (Taken 10/1/2022 1328)  Absence of infection during hospitalization:   Assess and monitor for signs and symptoms of infection   Monitor lab/diagnostic results     Problem: Metabolic/Fluid and Electrolytes - Adult  Goal: Electrolytes maintained within normal limits  Outcome: Progressing  Flowsheets (Taken 10/1/2022 1328)  Electrolytes maintained within normal limits:   Monitor labs and assess patient for signs and symptoms of electrolyte imbalances   Administer electrolyte replacement as ordered     Problem: Hematologic - Adult  Goal: Maintains hematologic stability  Outcome: Progressing  Flowsheets (Taken 10/1/2022 1328)  Maintains hematologic stability: Assess for signs and symptoms of bleeding or hemorrhage     Problem: Skin/Tissue Integrity  Goal: Absence of new skin breakdown  Description: 1. Monitor for areas of redness and/or skin breakdown  2. Assess vascular access sites hourly  3. Every 4-6 hours minimum:  Change oxygen saturation probe site  4. Every 4-6 hours:  If on nasal continuous positive airway pressure, respiratory therapy assess nares and determine need for appliance change or resting period. Outcome: Progressing     Problem: Chronic Conditions and Co-morbidities  Goal: Patient's chronic conditions and co-morbidity symptoms are monitored and maintained or improved  Outcome: Progressing  Flowsheets (Taken 10/1/2022 1328)  Care Plan - Patient's Chronic Conditions and Co-Morbidity Symptoms are Monitored and Maintained or Improved:   Monitor and assess patient's chronic conditions and comorbid symptoms for stability, deterioration, or improvement   Collaborate with multidisciplinary team to address chronic and comorbid conditions and prevent exacerbation or deterioration     Problem: Nutrition Deficit:  Goal: Optimize nutritional status  Outcome: Progressing  Flowsheets (Taken 10/1/2022 1328)  Nutrient intake appropriate for improving, restoring, or maintaining nutritional needs:   Assess nutritional status and recommend course of action   Monitor oral intake, labs, and treatment plans     Problem: Neurosensory - Adult  Goal: Achieves maximal functionality and self care  Outcome: Progressing  Flowsheets (Taken 10/1/2022 1328)  Achieves maximal functionality and self care:   Monitor swallowing and airway patency with patient fatigue and changes in neurological status   Encourage and assist patient to increase activity and self care with guidance from physical therapy/occupational therapy   Care plan reviewed with patient. Patient verbalize understanding of the plan of care and contribute to goal setting.

## 2022-10-01 NOTE — PROGRESS NOTES
Internal Medicine Resident Progress Note    Patient:  Samuel Boland    YOB: 1965  Unit/Bed:4-14/014-A  MRN: [id number]    Acct: [de-identified]   PCP: Jose Alberto Del Rio MD    Date of Admission: 9/28/2022      Assessment/Plan:  Right-sided pleural effusion  - S/p thoracentesis with chest tube placed with continuous suction  - Pleural fluid analysis consistent with exudative effusion  - Chest tube drainage of 285 mL over the last 24 hours, serosanguineous, continue to monitor output  -10/01 CXR showing no signs of worsening  - Case discussed with intensivist; continuous suction discontinued, intensive care team to manage chest tube    Acute hypoxic respiratory failure  - Maintaining adequate oxygenation on 3L via nasal cannula; titrate to maintain SPO2 92 to 96%  - Continue PAP therapy at night    ESRD, on hemodialysis  - S/p dialysis 09/30  - Scheduled dialysis MWF per nephrology  - Avoid nephrotoxic medications  - Trend BMP    Hypertension  - Vital signs stable  - Continue clonidine, doxazosin, verapamil    Type 2 diabetes mellitus  - HBA1c 5.0 2/2022  - Diet controlled  - Continue to monitor for hyperglycemia; target range [id number]    Systolic heart failure with preserved ejection fraction  - 09/29 echo with severely increased left ventricular wall thickness with ejection fraction 60%  - Strict I's and O's, daily weights  - Judicious diuresis and IV fluids per nephrology  - Continue isosorbide mononitrate    Hypertrophic cardiomyopathy  - 09/29 echo showing severe left ventricular concentric hypertrophy as well as dilation of the left atrium  - Strict I's and O's with judicious IV fluids and diuresis per nephrology  - Continue with aspirin, statin, isosorbide mononitrate, verapamil    Hyperphosphatemia  -Improved  - Low phos diet  - Patient's home phosphorus binder resumed  - Trend phos and BMP    PTSD  -Continue sertraline, atarax, trazodone    Unspecified Depressive Disorder  - Continue sertraline    GERD  - Continue PPI    Polysubstance abuse  - Patient counseled on substance abuse cessation    HLD  -Continue atorvastatin    Anemia of chronic disease  -Trend CBC  -Continue iron and folate supplementation    REZA  -Patient on BiPAP at night  -Recommendation for outpatient follow up for PAP titration with sleep clinic    Trapped Lung, resolved  Hx Thoracic Aneurysm  Hx AAA s/p aortic stent graft 7/2018  Hx COVID-19 pneumonia  Hx atrial flutter  Hx peripheral vascular disease  Hx hypertensive emergency  - Noted    Expected discharge date: Pending PT/OT evaluation as well as chest tube management    Disposition:   [x] Home  [] TCU  [] Rehab  [] Psych  [] SNF  [] [facility]  [] Other-    ===================================================================      Chief Complaint: Shortness of breath    Hospital Course: This is a 79-year-old -American male who presented to [address] [place] [address] on 09/28/2022 with worsening shortness of breath at rest and with exertion. CXR showed large right pleural effusion and patient required PAP therapy for adequate oxygenation. Patient was admitted and chest tube placed on 09/28 with 2 L of serosanguineous fluid drainage. Dialysis also performed at 09/28 with 2 L of fluid. Repeat dialysis performed on 09/29 with albumin administration. Repeat dialysis performed on 09/30/2022. Pleural fluid analysis was consistent with exudative effusion with testing for malignancy pending. Suction for chest tube was discontinued on 10/01. Subjective (past 24 hours):   Patient seen and evaluated at bedside. He denies chest pain, fever, chills, nausea, vomiting, and diarrhea. He endorses a pulling sensation where his chest tube is in place. He states that he is able to ambulate to and from the bathroom with minimal assistance. He denies any other acute symptoms or concerns at this time.     ROS: reviewed complete ROS unchanged unless otherwise stated in hospital course/subjective portion. Medications:  Reviewed    Infusion Medications    sodium chloride       Scheduled Medications    aspirin  81 mg Oral Daily    atorvastatin  40 mg Oral Nightly    cloNIDine  0.3 mg Oral TID    doxazosin  8 mg Oral Nightly    famotidine  20 mg Oral Daily    ferrous sulfate  325 mg Oral Daily with breakfast    folic acid  0,075 mcg Oral Daily    fluticasone  1 spray Nasal Daily    isosorbide mononitrate  120 mg Oral Daily    sertraline  100 mg Oral Daily    verapamil  240 mg Oral Daily    multivitamin  1 tablet Oral Daily    sodium chloride flush  10 mL IntraVENous 2 times per day    heparin (porcine)  5,000 Units SubCUTAneous 3 times per day     PRN Meds: hydrOXYzine HCl, traZODone, sodium chloride flush, sodium chloride, ondansetron **OR** ondansetron, polyethylene glycol, acetaminophen, HYDROcodone 5 mg - acetaminophen **OR** HYDROcodone 5 mg - acetaminophen, morphine        Intake/Output Summary (Last 24 hours) at 10/1/2022 0922  Last data filed at 10/1/2022 0641  Gross per 24 hour   Intake 1260 ml   Output 2585 ml   Net -1325 ml       Exam:  /69   Pulse (!) 105   Temp 98.2 °F (36.8 °C) (Oral)   Resp 20   Ht 6' 3\" (1.905 m)   Wt 221 lb 1.9 oz (100.3 kg)   SpO2 98%   BMI 27.64 kg/m²     General: No distress, appears stated age. Eyes:  PERRL. Conjunctivae/corneas clear. HENT: Head normal appearing. Nares normal. Oral mucosa moist.  Hearing intact. Neck: Supple, with full range of motion. Trachea midline. No gross JVD appreciated. Respiratory: Diminished breath sounds bilaterally. Nasal cannula in place. Right-sided chest tube in place draining serosanguineous fluid. Cardiovascular: Normal rate, regular rhythm with normal S1/S2 without murmurs. No lower extremity edema. Abdomen: Soft, non-tender, non-distended with normal bowel sounds. Musculoskeletal: No joint swelling or tenderness. Normal tone. No abnormal movements. Skin: Warm and dry.  No rashes or lesions. Neurologic:  No focal sensory/motor deficits in the upper or lower extremities. Cranial nerves:  grossly non-focal 2-12. Psychiatric: Alert and oriented, normal insight and thought content. Capillary Refill: Brisk,< 3 seconds. Peripheral Pulses: +2 palpable, equal bilaterally. Labs:   Recent Labs     09/29/22 0446 09/30/22 0352 10/01/22  0406   WBC 5.9 5.6 5.0   HGB 10.4* 10.8* 10.7*   HCT 35.7* 37.0* 36.6*   * 114* 125*     Recent Labs     09/29/22 0446 09/29/22 2038 09/30/22  0352 10/01/22  0406     --  136 137   K 5.2  --  5.4* 5.1     --  100 101   CO2 26  --  26 24   BUN 28*  --  25* 30*   CREATININE 6.6*  --  5.5* 5.7*   CALCIUM 8.6  --  8.9 8.9   PHOS  --  4.7 5.2*  --      Recent Labs     09/28/22  1000 09/29/22  0446   AST 12 9   ALT 11 8*   BILITOT 0.3 0.3   ALKPHOS 95 86     No results for input(s): INR in the last 72 hours. No results for input(s): Kimberly Jim in the last 72 hours. No results for input(s): PROCAL in the last 72 hours. Lab Results   Component Value Date/Time    NITRU NEGATIVE 04/03/2018 07:45 PM    WBCUA 2-4 04/03/2018 07:45 PM    BACTERIA NONE 04/03/2018 07:45 PM    RBCUA 5-10 04/03/2018 07:45 PM    BLOODU SMALL 04/03/2018 07:45 PM    SPECGRAV 1.014 03/07/2018 09:10 PM    GLUCOSEU NEGATIVE 04/03/2018 07:45 PM       Radiology (48 hours):  CT CHEST W CONTRAST    Result Date: 9/30/2022  1. A small right pneumothorax with a pigtail catheter catheter within it. Small residual right pleural effusion. 2. Status post endograft placement within the thoracic aorta. 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 A CT angiography of the chest without and with contrast  is recommended to better evaluate whether these represent calcifications or endoleak.  3. Masslike abnormalities are seen in the right lower lobe and right middle lobe that represent rounded atelectasis. 4. Other findings as described above. **This report has been created using voice recognition software. It may contain minor errors which are inherent in voice recognition technology. ** Final report electronically signed by Dr Jamal Duarte on 9/30/2022 10:27 AM       DVT prophylaxis:    [] Lovenox  [] SCDs  [] SQ Heparin  [x] Encourage ambulation   [] Already on Anticoagulation       Diet: ADULT DIET; Regular; 5 carb choices (75 gm/meal); Low Fat/Low Chol/High Fiber/2 gm Na; Low Potassium (Less than 3000 mg/day);  Low Phosphorus (Less than 1000 mg)  Code Status: Full Code  PT/OT: Consulted      Electronically signed by Mikey Cedillo MD on 10/1/2022 at 9:22 AM    Case was discussed with Attending, Dr. Hetty Jeans

## 2022-10-01 NOTE — PROGRESS NOTES
[facility]   Nephrology progress note  10/1/2022, 10:54 AM      Pt Name:    Michele oLpez  MRN:     [id number]     YOB: 1965  Admit Date:    9/28/2022  9:56 AM    Chief Complaint: Nephrology following for ESRD. Subjective:  Patient was seen and examined. Feels better, on 3 L NC. Objective:  24HR INTAKE/OUTPUT:    Intake/Output Summary (Last 24 hours) at 10/1/2022 1054  Last data filed at 10/1/2022 0641  Gross per 24 hour   Intake 1260 ml   Output 2585 ml   Net -1325 ml         I/O last 3 completed shifts: In: 1724 [P.O.:1420]  Out: 2625 [Chest Tube:325]  No intake/output data recorded.    Admission weight: 250 lb (113.4 kg)  Wt Readings from Last 3 Encounters:   09/30/22 221 lb 1.9 oz (100.3 kg)   09/13/21 250 lb (113.4 kg)   07/26/21 240 lb (108.9 kg)        Vitals :   Vitals:    10/01/22 0005 10/01/22 0342 10/01/22 0444 10/01/22 0818   BP:  (!) 113/59  138/69   Pulse:  86  (!) 105   Resp: 22 22 18 20   Temp:  98.9 °F (37.2 °C)  98.2 °F (36.8 °C)   TempSrc:  Oral  Oral   SpO2:  95%  98%   Weight:       Height:           Physical examination  General Appearance: alert and cooperative with exam, appears comfortable, no distress  Mouth/Throat: Oral mucosa moist  Neck: No JVD  Lungs: diminished  Heart:  S1, S2 heard  GI: soft, non-tender  Extremities: improved LE edema, left arm AV fistula    Medications:  Infusion:    sodium chloride       Meds:    aspirin  81 mg Oral Daily    atorvastatin  40 mg Oral Nightly    cloNIDine  0.3 mg Oral TID    doxazosin  8 mg Oral Nightly    famotidine  20 mg Oral Daily    ferrous sulfate  325 mg Oral Daily with breakfast    folic acid  8,742 mcg Oral Daily    fluticasone  1 spray Nasal Daily    isosorbide mononitrate  120 mg Oral Daily    sertraline  100 mg Oral Daily    verapamil  240 mg Oral Daily    multivitamin  1 tablet Oral Daily    sodium chloride flush  10 mL IntraVENous 2 times per day    heparin (porcine)  5,000 Units SubCUTAneous 3 times per day     Meds prn: hydrOXYzine HCl, traZODone, sodium chloride flush, sodium chloride, ondansetron **OR** ondansetron, polyethylene glycol, acetaminophen, HYDROcodone 5 mg - acetaminophen **OR** HYDROcodone 5 mg - acetaminophen, morphine     Lab Data :  CBC:   Recent Labs     09/29/22  0446 09/30/22  0352 10/01/22  0406   WBC 5.9 5.6 5.0   HGB 10.4* 10.8* 10.7*   HCT 35.7* 37.0* 36.6*   * 114* 125*     CMP:  Recent Labs     09/29/22  0446 09/29/22  2038 09/30/22  0352 10/01/22  0406     --  136 137   K 5.2  --  5.4* 5.1     --  100 101   CO2 26  --  26 24   BUN 28*  --  25* 30*   CREATININE 6.6*  --  5.5* 5.7*   GLUCOSE 114*  --  90 95   CALCIUM 8.6  --  8.9 8.9   MG 2.3  --  2.3 2.3   PHOS  --  4.7 5.2* 5.1*     Hepatic:   Recent Labs     09/29/22 0446   LABALBU 3.1*   AST 9   ALT 8*   BILITOT 0.3   ALKPHOS 86         Assessment and Plan:    1. ESRD on HD TTS  -will dialyze on MWF while inpatient d/t staffing   -he did have extra treatment on admission for pulmonary edema    2. Mild hyperkalemia : better  3. Hyperphosphatemia, monitor  4.. Acute hypoxic/hypercapneic resp failure  5. Pulmonary edema  6. Right pleural effusion s/p chest tube  7. Anemia in CKD, hgb at goal  8. DM  9. Secondary hyperparathyroidism    D/W patient     [id number] Lily Santos DO  [facility]    This report has been created using voice recognition software.  It may contain minor errors which are inherent in voice recognition technology

## 2022-10-02 ENCOUNTER — APPOINTMENT (OUTPATIENT)
Dept: CT IMAGING | Age: 57
DRG: 163 | End: 2022-10-02
Payer: MEDICARE

## 2022-10-02 ENCOUNTER — APPOINTMENT (OUTPATIENT)
Dept: GENERAL RADIOLOGY | Age: 57
DRG: 163 | End: 2022-10-02
Payer: MEDICARE

## 2022-10-02 LAB
ANION GAP SERPL CALCULATED.3IONS-SCNC: 15 MEQ/L (ref 8–16)
BUN BLDV-MCNC: 52 MG/DL (ref 7–22)
CALCIUM SERPL-MCNC: 9 MG/DL (ref 8.5–10.5)
CHLORIDE BLD-SCNC: 98 MEQ/L (ref 98–111)
CO2: 22 MEQ/L (ref 23–33)
CREAT SERPL-MCNC: 8.2 MG/DL (ref 0.4–1.2)
EKG Q-T INTERVAL: 414 MS
EKG QRS DURATION: 110 MS
EKG QTC CALCULATION (BAZETT): 420 MS
EKG R AXIS: -34 DEGREES
EKG T AXIS: 102 DEGREES
EKG VENTRICULAR RATE: 62 BPM
ERYTHROCYTE [DISTWIDTH] IN BLOOD BY AUTOMATED COUNT: 12.7 % (ref 11.5–14.5)
ERYTHROCYTE [DISTWIDTH] IN BLOOD BY AUTOMATED COUNT: 50 FL (ref 35–45)
GFR SERPL CREATININE-BSD FRML MDRD: 8 ML/MIN/1.73M2
GLUCOSE BLD-MCNC: 91 MG/DL (ref 70–108)
GLUCOSE BLD-MCNC: 97 MG/DL (ref 70–108)
HCT VFR BLD CALC: 35 % (ref 42–52)
HEMOGLOBIN: 10.5 GM/DL (ref 14–18)
LEUKEMIA/LYMPHOMA PHENOTYPING MISC: NORMAL
MCH RBC QN AUTO: 32.1 PG (ref 26–33)
MCHC RBC AUTO-ENTMCNC: 30 GM/DL (ref 32.2–35.5)
MCV RBC AUTO: 107 FL (ref 80–94)
PLATELET # BLD: 150 THOU/MM3 (ref 130–400)
PMV BLD AUTO: 10.7 FL (ref 9.4–12.4)
POTASSIUM SERPL-SCNC: 5.2 MEQ/L (ref 3.5–5.2)
POTASSIUM SERPL-SCNC: 5.6 MEQ/L (ref 3.5–5.2)
POTASSIUM SERPL-SCNC: 5.6 MEQ/L (ref 3.5–5.2)
RBC # BLD: 3.27 MILL/MM3 (ref 4.7–6.1)
SODIUM BLD-SCNC: 135 MEQ/L (ref 135–145)
WBC # BLD: 4.5 THOU/MM3 (ref 4.8–10.8)

## 2022-10-02 PROCEDURE — 71045 X-RAY EXAM CHEST 1 VIEW: CPT

## 2022-10-02 PROCEDURE — 80048 BASIC METABOLIC PNL TOTAL CA: CPT

## 2022-10-02 PROCEDURE — 6360000004 HC RX CONTRAST MEDICATION: Performed by: INTERNAL MEDICINE

## 2022-10-02 PROCEDURE — 82948 REAGENT STRIP/BLOOD GLUCOSE: CPT

## 2022-10-02 PROCEDURE — 85027 COMPLETE CBC AUTOMATED: CPT

## 2022-10-02 PROCEDURE — 6360000002 HC RX W HCPCS: Performed by: PHYSICIAN ASSISTANT

## 2022-10-02 PROCEDURE — 71275 CT ANGIOGRAPHY CHEST: CPT

## 2022-10-02 PROCEDURE — 6370000000 HC RX 637 (ALT 250 FOR IP): Performed by: STUDENT IN AN ORGANIZED HEALTH CARE EDUCATION/TRAINING PROGRAM

## 2022-10-02 PROCEDURE — 2700000000 HC OXYGEN THERAPY PER DAY

## 2022-10-02 PROCEDURE — 94761 N-INVAS EAR/PLS OXIMETRY MLT: CPT

## 2022-10-02 PROCEDURE — 84132 ASSAY OF SERUM POTASSIUM: CPT

## 2022-10-02 PROCEDURE — 36415 COLL VENOUS BLD VENIPUNCTURE: CPT

## 2022-10-02 PROCEDURE — 99232 SBSQ HOSP IP/OBS MODERATE 35: CPT | Performed by: INTERNAL MEDICINE

## 2022-10-02 PROCEDURE — 6370000000 HC RX 637 (ALT 250 FOR IP): Performed by: PHYSICIAN ASSISTANT

## 2022-10-02 PROCEDURE — 99233 SBSQ HOSP IP/OBS HIGH 50: CPT | Performed by: INTERNAL MEDICINE

## 2022-10-02 PROCEDURE — 2580000003 HC RX 258: Performed by: PHYSICIAN ASSISTANT

## 2022-10-02 PROCEDURE — 93010 ELECTROCARDIOGRAM REPORT: CPT | Performed by: NUCLEAR MEDICINE

## 2022-10-02 PROCEDURE — 99291 CRITICAL CARE FIRST HOUR: CPT | Performed by: INTERNAL MEDICINE

## 2022-10-02 PROCEDURE — 2060000000 HC ICU INTERMEDIATE R&B

## 2022-10-02 RX ADMIN — HEPARIN SODIUM 5000 UNITS: 5000 INJECTION INTRAVENOUS; SUBCUTANEOUS at 06:02

## 2022-10-02 RX ADMIN — Medication 1 TABLET: at 09:24

## 2022-10-02 RX ADMIN — SERTRALINE 100 MG: 100 TABLET, FILM COATED ORAL at 09:23

## 2022-10-02 RX ADMIN — ASPIRIN 81 MG: 81 TABLET, COATED ORAL at 09:23

## 2022-10-02 RX ADMIN — FERROUS SULFATE TAB 325 MG (65 MG ELEMENTAL FE) 325 MG: 325 (65 FE) TAB at 09:22

## 2022-10-02 RX ADMIN — CLONIDINE HYDROCHLORIDE 0.3 MG: 0.2 TABLET ORAL at 09:23

## 2022-10-02 RX ADMIN — FAMOTIDINE 20 MG: 20 TABLET ORAL at 09:23

## 2022-10-02 RX ADMIN — CLONIDINE HYDROCHLORIDE 0.3 MG: 0.2 TABLET ORAL at 19:56

## 2022-10-02 RX ADMIN — SODIUM CHLORIDE, PRESERVATIVE FREE 10 ML: 5 INJECTION INTRAVENOUS at 09:25

## 2022-10-02 RX ADMIN — FOLIC ACID 1000 MCG: 1 TABLET ORAL at 09:23

## 2022-10-02 RX ADMIN — VERAPAMIL HYDROCHLORIDE 240 MG: 240 TABLET, FILM COATED, EXTENDED RELEASE ORAL at 09:24

## 2022-10-02 RX ADMIN — FLUTICASONE PROPIONATE 1 SPRAY: 50 SPRAY, METERED NASAL at 09:25

## 2022-10-02 RX ADMIN — HEPARIN SODIUM 5000 UNITS: 5000 INJECTION INTRAVENOUS; SUBCUTANEOUS at 15:07

## 2022-10-02 RX ADMIN — IOPAMIDOL 80 ML: 755 INJECTION, SOLUTION INTRAVENOUS at 10:48

## 2022-10-02 RX ADMIN — SODIUM CHLORIDE, PRESERVATIVE FREE 10 ML: 5 INJECTION INTRAVENOUS at 19:55

## 2022-10-02 RX ADMIN — HEPARIN SODIUM 5000 UNITS: 5000 INJECTION INTRAVENOUS; SUBCUTANEOUS at 21:58

## 2022-10-02 RX ADMIN — ATORVASTATIN CALCIUM 40 MG: 40 TABLET, FILM COATED ORAL at 19:55

## 2022-10-02 RX ADMIN — SODIUM ZIRCONIUM CYCLOSILICATE 10 G: 10 POWDER, FOR SUSPENSION ORAL at 16:51

## 2022-10-02 RX ADMIN — ISOSORBIDE MONONITRATE 120 MG: 60 TABLET, EXTENDED RELEASE ORAL at 09:23

## 2022-10-02 RX ADMIN — DOXAZOSIN 8 MG: 4 TABLET ORAL at 19:55

## 2022-10-02 ASSESSMENT — PAIN SCALES - GENERAL
PAINLEVEL_OUTOF10: 0

## 2022-10-02 NOTE — PROGRESS NOTES
Hospitalist Progress Note    Patient:  [facility]      Unit/Bed:4K-14/014-A    YOB: 1965    MRN: [id number]       Acct: [de-identified]     PCP: Dakota Herrera MD    Date of Admission: 9/28/2022    Assessment/Plan:    1) Pleural effusion, right: S/p thoracentesis with chest tube placed with continuous suction. Pleural fluid analysis consistent with exudative effusion. Chest tube drainage of 70 mL over the last 24 hours, serosanguineous. 10/2 CXR showing table moderate right and small left effusions, stable right basilar atelectasis worsened today. Plan:  -Intensivist following, ordered CTA chest with without contrast will continue to manage chest tube. -Continue to monitor output of chest tube    2) Acute hypoxic respiratory failure: Maintaining adequate oxygenation on 3L via nasal cannula. Plan:  -Continue to titrate to maintain SPO2 >90%  -Continue PAP therapy at night    3) Hyperkalemia: Potassium 5.6 in AM and repeat 5.2. Plan:  -Monitor potassium level  -Lokelma x1 if elevated    4) ESRD, on hemodialysis: S/p dialysis 09/30  Plan:  -Nephrology following, plan for dialysis tomorrow (MWF)  -Avoid nephrotoxic medications  -Continue to monitor kidney function with daily BMP     5) Hypertension, history: Most recent /65.  -Continue clonidine, doxazosin, and verapamil with parameters     6) Type 2 diabetes mellitus: Hgb A1c 5.0 on 2/2022. Plan:  -Continue to monitor for hyperglycemia; target range 785-283    7) Systolic heart failure with preserved ejection fraction: Echo on 9/29 showed severely increased left ventricular wall thickness with EF 60%. Plan:  -Strict I's and O's and daily weights  -Diuresis and IV fluids per nephrology  -Continue Imdur    8) Hypertrophic cardiomyopathy: 09/29 echo showing severe left ventricular concentric hypertrophy as well as dilation of the left atrium.   Plan:  -Continue with aspirin, statin, isosorbide mononitrate, verapamil  -Strict I's and O's with judicious IV fluids and diuresis per nephrology     9) Hyperphosphatemia: Improved. Plan:  -Continue low phos diet  -Patient's home phosphorus binder resumed  -Trend phos and BMP    10) PTSD:  -Continue sertraline, atarax, trazodone     11) Unspecified Depressive Disorder:  -Continue sertraline     12) GERD:  -Continue PPI    13) Polysubstance abuse:  -Patient counseled on substance abuse cessation    14) HLD:  -Continue atorvastatin    15) Anemia of chronic disease:  Plan:  -Continue to monitor with CBC  -Continue iron and folate supplementation     16) REZA: Patient on BiPAP at night  Plan:  -Recommendation for outpatient follow up for PAP titration with sleep clinic    Trapped Lung, resolved  Hx Thoracic Aneurysm  Hx AAA s/p aortic stent graft 7/2018  Hx COVID-19 pneumonia  Hx atrial flutter  Hx peripheral vascular disease  Hx hypertensive emergency  - Noted      Expected discharge date:  TBD    Disposition:    [x] Home       [] TCU       [] Rehab       [] Psych       [] SNF       [] [place]       [] Other-    Chief Complaint: Shortness of breath    Hospital Course: This is a 57-year-old -American male who presented to [address] [place] [address] on 09/28/2022 with worsening shortness of breath at rest and with exertion. CXR showed large right pleural effusion and patient required PAP therapy for adequate oxygenation. Patient was admitted and chest tube placed on 09/28 with 2 L of serosanguineous fluid drainage. Dialysis also performed at 09/28 with 2 L of fluid. Repeat dialysis performed on 09/29 with albumin administration. Repeat dialysis performed on 09/30/2022. Pleural fluid analysis was consistent with exudative effusion with testing for malignancy pending. Suction for chest tube was discontinued on 10/01. Subjective (past 24 hours):   Patient seen and examined at bedside this morning. He states he is feeling okay today. He is having some soreness around his chest tube area.   Chest tube drained 10 mL overnight. He denies having any chest pain, nausea, vomiting, fever, chills, or diarrhea today. ROS (12 point review of systems completed. Pertinent positives noted. Otherwise ROS is negative). Medications:  Reviewed    Infusion Medications    sodium chloride       Scheduled Medications    sodium zirconium cyclosilicate  10 g Oral Once    aspirin  81 mg Oral Daily    atorvastatin  40 mg Oral Nightly    cloNIDine  0.3 mg Oral TID    doxazosin  8 mg Oral Nightly    famotidine  20 mg Oral Daily    ferrous sulfate  325 mg Oral Daily with breakfast    folic acid  7,813 mcg Oral Daily    fluticasone  1 spray Nasal Daily    isosorbide mononitrate  120 mg Oral Daily    sertraline  100 mg Oral Daily    verapamil  240 mg Oral Daily    multivitamin  1 tablet Oral Daily    sodium chloride flush  10 mL IntraVENous 2 times per day    heparin (porcine)  5,000 Units SubCUTAneous 3 times per day     PRN Meds: hydrOXYzine HCl, traZODone, sodium chloride flush, sodium chloride, ondansetron **OR** ondansetron, polyethylene glycol, acetaminophen, HYDROcodone 5 mg - acetaminophen **OR** HYDROcodone 5 mg - acetaminophen, morphine      Intake/Output Summary (Last 24 hours) at 10/2/2022 1447  Last data filed at 10/2/2022 1408  Gross per 24 hour   Intake 790 ml   Output 80 ml   Net 710 ml       Diet:  ADULT DIET; Regular; 5 carb choices (75 gm/meal); Low Fat/Low Chol/High Fiber/2 gm Na; Low Sodium (2 gm); Low Potassium (Less than 3000 mg/day); Low Phosphorus (Less than 1000 mg)    Exam:  /65   Pulse 82   Temp 97.7 °F (36.5 °C) (Oral)   Resp 18   Ht 6' 3\" (1.905 m)   Wt 240 lb (108.9 kg)   SpO2 100%   BMI 30.00 kg/m²     General appearance: No apparent distress, appears stated age and cooperative. HEENT: Pupils equal, round, and reactive to light. Conjunctivae/corneas clear. Neck: Supple, with full range of motion. No jugular venous distention. Trachea midline. Respiratory:  Normal respiratory effort. Clear to auscultation, bilaterally without Rales/Wheezes/Rhonchi. Cardiovascular: Regular rate and rhythm with normal S1/S2 without murmurs, rubs or gallops. Abdomen: Soft, non-tender, non-distended with normal bowel sounds. Chest tube on right in place. Musculoskeletal: passive and active ROM x 4 extremities. Skin: Skin color, texture, turgor normal.  No rashes or lesions. Neurologic:  Neurovascularly intact without any focal sensory/motor deficits. Cranial nerves: II-XII intact, grossly non-focal.  Psychiatric: Alert and oriented, thought content appropriate, normal insight  Capillary Refill: Brisk,< 3 seconds   Peripheral Pulses: +2 palpable, equal bilaterally       Labs:   Recent Labs     09/30/22  0352 10/01/22  0406 10/02/22  0340   WBC 5.6 5.0 4.5*   HGB 10.8* 10.7* 10.5*   HCT 37.0* 36.6* 35.0*   * 125* 150     Recent Labs     09/29/22  2038 09/30/22  0352 09/30/22  0352 10/01/22  0406 10/02/22  0340 10/02/22  0836   NA  --  136  --  137 135  --    K  --  5.4*   < > 5.1 5.6* 5.2   CL  --  100  --  101 98  --    CO2  --  26  --  24 22*  --    BUN  --  25*  --  30* 52*  --    CREATININE  --  5.5*  --  5.7* 8.2*  --    CALCIUM  --  8.9  --  8.9 9.0  --    PHOS 4.7 5.2*  --  5.1*  --   --     < > = values in this interval not displayed. No results for input(s): AST, ALT, BILIDIR, BILITOT, ALKPHOS in the last 72 hours. No results for input(s): INR in the last 72 hours. No results for input(s): Kwan Elif in the last 72 hours. Microbiology:      Urinalysis:      Lab Results   Component Value Date/Time    NITRU NEGATIVE 04/03/2018 07:45 PM    WBCUA 2-4 04/03/2018 07:45 PM    BACTERIA NONE 04/03/2018 07:45 PM    RBCUA 5-10 04/03/2018 07:45 PM    BLOODU SMALL 04/03/2018 07:45 PM    SPECGRAV 1.014 03/07/2018 09:10 PM    GLUCOSEU NEGATIVE 04/03/2018 07:45 PM       Radiology:  CTA CHEST W WO CONTRAST   Final Result       1. Endoluminal graft in the thoracic aorta.  This appears slightly smaller than on remote study dated 2/26/2020. There is no definite evidence of an endoleak. Please correlate clinically. 2. Cardiomegaly. Mitral valve calcification. 3. Right-sided chest tube. Small pneumothorax and effusion at the right lung base. Right lower lobe infiltrate. 4. Evidence for granulomatous disease in the right lower lobe, right hilum and mediastinum. 5. Thoracic spondylosis. 6. Old granulomatous disease in the spleen. 7. Small kidneys bilaterally. **This report has been created using voice recognition software. It may contain minor errors which are inherent in voice recognition technology. **      Final report electronically signed by DR Jacey Gardiner on 10/2/2022 11:48 AM      XR CHEST PORTABLE   Final Result   Impression:   Stable moderate right and small left effusions. No definite residual    pneumothorax. Stable right basilar atelectasis vs infiltrate. This document has been electronically signed by: Roxane Schaeffer MD on    10/02/2022 05:33 AM      XR CHEST PORTABLE   Final Result   1. Stable chest x-ray, no interval change since previous study dated 28th of September 2022. Ed Colmenares **This report has been created using voice recognition software. It may contain minor errors which are inherent in voice recognition technology. **      Final report electronically signed by DR Jacey Gardiner on 10/1/2022 11:48 AM      CT CHEST W CONTRAST   Final Result   1. A small right pneumothorax with a pigtail catheter catheter within it. Small residual right pleural effusion. 2. Status post endograft placement within the thoracic aorta. 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 A CT angiography of the chest without and with contrast    is recommended to better evaluate whether these represent calcifications or endoleak.    3. Masslike abnormalities are seen in the right lower lobe and right middle lobe that represent rounded atelectasis. 4. Other findings as described above. **This report has been created using voice recognition software. It may contain minor errors which are inherent in voice recognition technology. **      Final report electronically signed by Dr Nancy Hook on 9/30/2022 10:27 AM      XR CHEST PORTABLE   Final Result   1. New right-sided chest tube in place. Diminution in the abnormal density at the right lung base. Small pneumothorax at the right lung base. 2. Abnormal densities in the right and left lower lobes. 3. Cardiomegaly. 4. Aortic stent graft in place. .               **This report has been created using voice recognition software. It may contain minor errors which are inherent in voice recognition technology. **      Final report electronically signed by  [facility] on 9/28/2022 4:55 PM      XR CHEST PORTABLE   Final Result   1. Large right pleural effusion with associated compressive atelectasis or right lower lobe collapse. 2. Mild increase groundglass opacities in the left lung are nonspecific but can suggest underlying pulmonary edema or pneumonia. 3. Other findings as described above. **This report has been created using voice recognition software. It may contain minor errors which are inherent in voice recognition technology. **      Final report electronically signed by Dr aNncy Hook on 9/28/2022 10:32 AM          DVT prophylaxis: [] Lovenox                                 [] SCDs                                 [x] SQ Heparin                                 [] Encourage ambulation           [] Already on Anticoagulation     Code Status: Full Code    PT/OT Eval Status: Ordered    Tele:   [x] yes             [] no    Electronically signed by Miguel A Reyes DO on 10/2/2022 at 2:47 PM

## 2022-10-02 NOTE — FLOWSHEET NOTE
10/02/22 1026   Safe Environment   Safety Measures Other (comment)  (virtual nurse safety round complete)   Pt and visitor sleeping in room at this time

## 2022-10-02 NOTE — PROGRESS NOTES
[facility]   Nephrology progress note  10/2/2022, 9:58 AM      Pt Name:    Sarah Martinez  MRN:     [id number]     YOB: 1965  Admit Date:    9/28/2022  9:56 AM    Chief Complaint: Nephrology following for ESRD. Subjective:  Patient was seen and examined this morning. Feels ok. CXR reviewed. Breathing improved. Objective:  24HR INTAKE/OUTPUT:    Intake/Output Summary (Last 24 hours) at 10/2/2022 0958  Last data filed at 10/2/2022 0600  Gross per 24 hour   Intake 630 ml   Output 70 ml   Net 560 ml         I/O last 3 completed shifts: In: 9772 [P.O.:1610]  Out: 195 [Chest Tube:195]  No intake/output data recorded.    Admission weight: 250 lb (113.4 kg)  Wt Readings from Last 3 Encounters:   10/02/22 240 lb (108.9 kg)   09/13/21 250 lb (113.4 kg)   07/26/21 240 lb (108.9 kg)        Vitals :   Vitals:    10/01/22 2306 10/02/22 0104 10/02/22 0354 10/02/22 0900   BP: 129/62  (!) 161/73 (!) 160/71   Pulse: 81 81 88 91   Resp: 18 18 18 18   Temp: 97.9 °F (36.6 °C)   97.1 °F (36.2 °C)   TempSrc: Oral  Oral Oral   SpO2: 92% 94% 94% (!) 86%   Weight:   240 lb (108.9 kg)    Height:           Physical examination  General Appearance: alert and cooperative with exam, appears comfortable, no distress  Mouth/Throat: Oral mucosa moist  Neck: No JVD  Lungs: diminished, right sided chest tube  Heart:  S1, S2 heard  GI: soft, non-tender  Extremities: improved LE edema, left arm AV fistula    Medications:  Infusion:    sodium chloride       Meds:    sodium zirconium cyclosilicate  10 g Oral Once    aspirin  81 mg Oral Daily    atorvastatin  40 mg Oral Nightly    cloNIDine  0.3 mg Oral TID    doxazosin  8 mg Oral Nightly    famotidine  20 mg Oral Daily    ferrous sulfate  325 mg Oral Daily with breakfast    folic acid  3,394 mcg Oral Daily    fluticasone  1 spray Nasal Daily    isosorbide mononitrate  120 mg Oral Daily    sertraline  100 mg Oral Daily    verapamil  240 mg Oral Daily multivitamin  1 tablet Oral Daily    sodium chloride flush  10 mL IntraVENous 2 times per day    heparin (porcine)  5,000 Units SubCUTAneous 3 times per day     Meds prn: hydrOXYzine HCl, traZODone, sodium chloride flush, sodium chloride, ondansetron **OR** ondansetron, polyethylene glycol, acetaminophen, HYDROcodone 5 mg - acetaminophen **OR** HYDROcodone 5 mg - acetaminophen, morphine     Lab Data :  CBC:   Recent Labs     09/30/22  0352 10/01/22  0406 10/02/22  0340   WBC 5.6 5.0 4.5*   HGB 10.8* 10.7* 10.5*   HCT 37.0* 36.6* 35.0*   * 125* 150     CMP:  Recent Labs     09/29/22  2038 09/30/22  0352 09/30/22  0352 10/01/22  0406 10/02/22  0340 10/02/22  0836   NA  --  136  --  137 135  --    K  --  5.4*   < > 5.1 5.6* 5.2   CL  --  100  --  101 98  --    CO2  --  26  --  24 22*  --    BUN  --  25*  --  30* 52*  --    CREATININE  --  5.5*  --  5.7* 8.2*  --    GLUCOSE  --  90  --  95 91  --    CALCIUM  --  8.9  --  8.9 9.0  --    MG  --  2.3  --  2.3  --   --    PHOS 4.7 5.2*  --  5.1*  --   --     < > = values in this interval not displayed. Hepatic:   No results for input(s): LABALBU, AST, ALT, ALB, BILITOT, ALKPHOS in the last 72 hours. Assessment and Plan:    1. ESRD on HD TTS  -will dialyze on MWF while inpatient d/t staffing   -he did have extra treatment on admission for pulmonary edema  -HD tomorrow    2. Hyperkalemia 5.6, repeat 5.2, lokelma ordered. Continue Low K diet  3. Hyperphosphatemia, on Velphoro  4.. Acute hypoxic/hypercapneic resp failure  5. Pulmonary edema  6. Right pleural effusion s/p chest tube  7. Anemia in CKD, hgb at goal  8. DM  9. Secondary hyperparathyroidism    D/W patient     Samuel Pryor DO  [facility]    This report has been created using voice recognition software.  It may contain minor errors which are inherent in voice recognition technology

## 2022-10-02 NOTE — PROGRESS NOTES
Intensive Care Unit  Cardiac Step Down Unit  Attending Progress Note      Team members present on rounds:  Nursing and Patient    ICU guidelines/prophylaxis active for the following:    head above bed above 30 degrees, DVT prophylaxis, and ulcer prophylaxis    Patient Examined? yes    Additions/differences/highlights to the resident's/fellow's exam:  VITALS:  BP (!) 160/71   Pulse 91   Temp 97.1 °F (36.2 °C) (Oral)   Resp 18   Ht 6' 3\" (1.905 m)   Wt 240 lb (108.9 kg)   SpO2 (!) 86%   BMI 30.00 kg/m²   MAXIMUM TEMPERATURE OVER 24HRS:  Temp (24hrs), Av.8 °F (36.6 °C), Min:97.1 °F (36.2 °C), Max:98.8 °F (37.1 °C)   VENT SETTINGS:    Vent Information  Ventilator ID: [id number]  Equipment Changed: Mask  Additional Respiratory Assessments  Heart Rate: 91  Resp: 18  SpO2: (!) 86 %  CONSTITUTIONAL:  awake, alert, cooperative, no respiratory distress, normal breath sounds, no rales, no wheezing distress, and morbidly obese  EYES:  Lids and lashes normal, pupils equal, round and reactive to light, extra ocular muscles intact, sclera clear, conjunctiva normal  HEENT:  Normocepalic, without obvious abnormality  NECK:  supple, symmetrical, trachea midline,, no adenopathy, thyroid not enlarge, symmetric, no tenderness, no carotid bruits, positive JVD. HEMATOLOGIC/LYMPHATICS: No lymph nodes enlargement noted in the neck  BACK:  range of motion limited because of morbid obesity and shortness of breath  LUNGS:  Mild respiratory distress and diminished breath sounds diffuse  CARDIOVASCULAR: Distant heart sounds. ABDOMEN:  normal bowel sounds, distended, and full  CHEST/BREASTS:  Breasts symmetrical, skin without lesion(s), no nipple retraction or dimpling, no nipple discharge, no masses palpated, no axillary or supraclavicular adenopathy, minimally tender at the site of pigtail right chest.  GENITAL/URINARY: Deferred.   MUSCULOSKELETAL:  there is no redness, warmth, or swelling of the joints  NEUROLOGIC:  awake  alert  sensory intact  Motor Exam: Nonfocal, generalized weakness  SKIN:  No bruising or bleeding. Normal skin color, texture, and turgor. No redness, warmth, or swelling. No rashes, lesions, or abnormal moles. DATA:  CBC:   Lab Results   Component Value Date/Time    WBC 4.5 10/02/2022 03:40 AM    RBC 3.27 10/02/2022 03:40 AM    RBC 5.03 02/24/2012 03:50 PM    HGB 10.5 10/02/2022 03:40 AM    HCT 35.0 10/02/2022 03:40 AM    .0 10/02/2022 03:40 AM    RDW 15.0 04/03/2018 08:12 PM     10/02/2022 03:40 AM     CMP:    Lab Results   Component Value Date/Time     10/02/2022 03:40 AM    K 5.2 10/02/2022 08:36 AM    K 5.2 09/29/2022 04:46 AM    CL 98 10/02/2022 03:40 AM    CO2 22 10/02/2022 03:40 AM    BUN 52 10/02/2022 03:40 AM    CREATININE 8.2 10/02/2022 03:40 AM    LABGLOM 8 10/02/2022 03:40 AM    GLUCOSE 91 10/02/2022 03:40 AM    GLUCOSE 104 02/16/2012 11:20 AM    PROT 6.5 09/29/2022 04:46 AM    CALCIUM 9.0 10/02/2022 03:40 AM    BILITOT 0.3 09/29/2022 04:46 AM    ALKPHOS 86 09/29/2022 04:46 AM    AST 9 09/29/2022 04:46 AM    ALT 8 09/29/2022 04:46 AM       KEY ISSUES/FINDINGS/ASSESSMENT AND PLAN:  19-year-old male with a past medical history of HTN, HLD, TAA/AAA s/p aortic stent graft, FSGS, ESRD on HD, secondary hyperparathyroidism, sleep apnea, anemia, GERD, depression, history of substance abuse including cocaine/EtOH and current smoker who presented to [facility] ED for shortness of breath 9/28. The patient reports 3-week history of increased shortness of breath and progressive dyspnea on exertion. This is associated with worsening lower extremity edema. On initial evaluation he was noted to have mild hypoxemia. CXR demonstrated large right pleural effusion with associated compressive atelectasis or right lower lobe collapse. Mildly increased groundglass opacities in left lung. He was placed on BiPAP salvage for increased work of breathing.   The patient was admitted to the medical floor and subsequently underwent a chest tube placement for a large right loculated pleural effusion 9/28. Initial output was 2 L of serosanguineous fluid. This was sent for cytology, cell count differential and studies for lights criteria. During hospitalization the patient was weaned down to 6 L NC and today to 3 L/min nasal cannula with high 90s pulse ox. Initial pleural fluid analysis was positive for indicative of exudative effusion. Chest output has been decreasing. No significant events overnight. This morning the patient is doing well. He reports improvement in his breathing and shortness of breath. .  He does report some chest pain, associated with chest tube. The patient has been weaned down to 6 L NC.   CT chest done 09/30/2022 showing hyperdensities imaging that could point to possibility of 1 to leak for previous aortic stent. CT angiography of the chest without contrast ordered today as per radiologist recommendation rule out endoleak, results of CT angiography which showed no endoleak. With decreasing right pleural effusion and minimal right pneumothorax. Pleural effusion, right:  CXR demonstrated large loculated right-sided pleural effusion. Chest tube placed 9/28. Initial output 2 L serosanguineous fluid. Lights criteria indicative of exudative effusion, no malignancy noted per cytology. Negative microbiology. Doing well minimal right pleural effusion with minimal right pneumothorax with 80 cc chest output last 12 hours with no acute events. Possible removal of chest tube in a.m. per Dr. Nancy Gutierrez. Entrapped lung:  Continue with chest tube and continuous suction. Assess how lung reexpands. Acute hypoxic respiratory failure, resolving: Secondary to large loculated right pleural effusion with some concomitant volume overload. Chest tube present. Weaned to 3 L NC pulse ox high 90s. Wean as tolerated for SpO2 > 92%. ESRD: On HD. Complicated with history of FSGS and DM. Nephrology following. HTN: With history of left renal artery stenosis. Continue with doxazosin, isosorbide mononitrate and verapamil. Chronically on high-dose clonidine. Primary following. HLD: On Lipitor. History of thoracic and AAA: with dissection s/p aortic stent graft 7/2018. 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 on all Monday morning. DM2:  Last HgbA1c 5.0 2/2022. Diet controlled. Primary following  Secondary hyperparathyroidism: Per chart review, secondary to renal failure  REZA: per chart review, not on CPAP. Chronic macrocytic anemia:  Baseline Hgb ~10-11. Stable. No obvious signs of GIB. Depression: On Zoloft  GERD:   On Pepcid  History of substance abuse: Per chart review, including EtOH and cocaine. Encouraged cessation. Tobacco abuse: Counseled on smoking cessation. Critical condition, guarded prognosis.   CC time 31 min apart from procedures

## 2022-10-02 NOTE — PLAN OF CARE
Problem: Discharge Planning  Goal: Discharge to home or other facility with appropriate resources  10/2/2022 0109 by Monalisa Mcrae RN  Outcome: Progressing  Flowsheets (Taken 10/2/2022 0109)  Discharge to home or other facility with appropriate resources:   Identify barriers to discharge with patient and caregiver   Arrange for needed discharge resources and transportation as appropriate   Identify discharge learning needs (meds, wound care, etc)  Note: Patient plans to return home with wife at discharge and no needs voiced at this time. Patient working with social work for discharge planning. Problem: Pain  Goal: Verbalizes/displays adequate comfort level or baseline comfort level  10/2/2022 0109 by Monalisa Mcrae RN  Outcome: Progressing  Flowsheets (Taken 10/2/2022 0109)  Verbalizes/displays adequate comfort level or baseline comfort level:   Encourage patient to monitor pain and request assistance   Administer analgesics based on type and severity of pain and evaluate response   Assess pain using appropriate pain scale   Implement non-pharmacological measures as appropriate and evaluate response  Note: Pain Assessment: 0-10  Pain Level: 0   Patient's Stated Pain Goal: 0 - No pain   Is pain goal met at this time? Yes     Non-Pharmaceutical Pain Intervention(s): Rest       Problem: Safety - Adult  Goal: Free from fall injury  10/2/2022 0109 by Monalisa Mcrae RN  Outcome: Progressing  Note: Patient alert and oriented x4. Bed alarmed armed. Bed wheels locked. Bedside table in reach. Patient up with assistance when ambulating. Patient verbalizes and demonstrates the use of the call light. Hourly rounding being completed.        Problem: ABCDS Injury Assessment  Goal: Absence of physical injury  10/2/2022 0109 by Monalisa Mcrae RN  Outcome: Progressing  Flowsheets (Taken 10/2/2022 0109)  Absence of Physical Injury: Implement safety measures based on patient assessment     Problem: Respiratory - Adult  Goal: Achieves optimal ventilation and oxygenation  10/2/2022 0109 by Shasta Jackson RN  Outcome: Progressing  Flowsheets (Taken 10/2/2022 0109)  Achieves optimal ventilation and oxygenation:   Assess for changes in respiratory status   Oxygen supplementation based on oxygen saturation or arterial blood gases   Assess for changes in mentation and behavior  Note: Patient on 3 L NC. Will wean as appropriate     Problem: Cardiovascular - Adult  Goal: Maintains optimal cardiac output and hemodynamic stability  10/2/2022 0109 by Shasta Jackson RN  Outcome: Progressing  Flowsheets (Taken 10/2/2022 0109)  Maintains optimal cardiac output and hemodynamic stability:   Monitor blood pressure and heart rate   Assess for signs of decreased cardiac output     Problem: Cardiovascular - Adult  Goal: Absence of cardiac dysrhythmias or at baseline  10/2/2022 0109 by Shasta Jackson RN  Outcome: Progressing  Flowsheets (Taken 10/2/2022 0109)  Absence of cardiac dysrhythmias or at baseline: Monitor cardiac rate and rhythm     Problem: Skin/Tissue Integrity - Adult  Goal: Skin integrity remains intact  10/2/2022 0109 by Shasta Jackson RN  Outcome: Progressing  Flowsheets (Taken 10/2/2022 0109)  Skin Integrity Remains Intact:   Monitor for areas of redness and/or skin breakdown   Assess vascular access sites hourly   Every 4-6 hours minimum: Change oxygen saturation probe site  Note: Patient turns self independently, assistance provided when needed. Patient educated on the importance of turning self frequently to prevent breakdown. No new skin changes noted thus far this shift. Will continue to monitor.         Problem: Infection - Adult  Goal: Absence of infection at discharge  10/2/2022 0109 by Shasta Jackson RN  Outcome: Progressing  Flowsheets (Taken 10/2/2022 0109)  Absence of infection at discharge:   Assess and monitor for signs and symptoms of infection   Monitor lab/diagnostic results   Monitor all insertion sites i.e., indwelling lines, tubes and drains     Problem: Infection - Adult  Goal: Absence of infection during hospitalization  10/2/2022 0109 by Chang Owens RN  Outcome: Progressing  Flowsheets (Taken 10/2/2022 0109)  Absence of infection during hospitalization:   Assess and monitor for signs and symptoms of infection   Monitor all insertion sites i.e., indwelling lines, tubes and drains   Monitor lab/diagnostic results  Note: No s/s of infection      Problem: Metabolic/Fluid and Electrolytes - Adult  Goal: Electrolytes maintained within normal limits  10/2/2022 0109 by Chang Owens RN  Outcome: Progressing  Flowsheets (Taken 10/2/2022 0109)  Electrolytes maintained within normal limits: Monitor labs and assess patient for signs and symptoms of electrolyte imbalances     Problem: Hematologic - Adult  Goal: Maintains hematologic stability  10/2/2022 0109 by Chang Owens RN  Outcome: Progressing  Flowsheets (Taken 10/2/2022 0109)  Maintains hematologic stability: Assess for signs and symptoms of bleeding or hemorrhage  Note: No signs of bleeding. Problem: Neurosensory - Adult  Goal: Achieves maximal functionality and self care  10/2/2022 0109 by Chang Owens RN  Outcome: Progressing  Flowsheets (Taken 10/2/2022 0109)  Achieves maximal functionality and self care: Monitor swallowing and airway patency with patient fatigue and changes in neurological status     Problem: Skin/Tissue Integrity  Goal: Absence of new skin breakdown  Description: 1. Monitor for areas of redness and/or skin breakdown  2. Assess vascular access sites hourly  3. Every 4-6 hours minimum:  Change oxygen saturation probe site  4. Every 4-6 hours:  If on nasal continuous positive airway pressure, respiratory therapy assess nares and determine need for appliance change or resting period. 10/2/2022 0109 by Chang Owens RN  Outcome: Progressing  Note: Patient turns self independently, assistance provided when needed.  Patient educated on the importance of turning self frequently to prevent breakdown. No new skin changes noted thus far this shift. Will continue to monitor. Problem: Chronic Conditions and Co-morbidities  Goal: Patient's chronic conditions and co-morbidity symptoms are monitored and maintained or improved  10/2/2022 0109 by Kya Lal RN  Outcome: Progressing  Flowsheets (Taken 10/2/2022 0109)  Care Plan - Patient's Chronic Conditions and Co-Morbidity Symptoms are Monitored and Maintained or Improved:   Monitor and assess patient's chronic conditions and comorbid symptoms for stability, deterioration, or improvement   Collaborate with multidisciplinary team to address chronic and comorbid conditions and prevent exacerbation or deterioration   Update acute care plan with appropriate goals if chronic or comorbid symptoms are exacerbated and prevent overall improvement and discharge     Problem: Nutrition Deficit:  Goal: Optimize nutritional status  10/2/2022 0109 by Kya Lal RN  Outcome: Progressing  Flowsheets (Taken 10/2/2022 0109)  Nutrient intake appropriate for improving, restoring, or maintaining nutritional needs:   Assess nutritional status and recommend course of action   Monitor oral intake, labs, and treatment plans  Note: Tolerating diet     Care plan reviewed with patient and family. Patient and family verbalize understanding of the plan of care and contribute to goal setting.

## 2022-10-02 NOTE — PROGRESS NOTES
Upon entering pt room, pt was not wearing O2 via NC. O2 sats 77-81% on RA. 3L O2 via NC applied, O2 sats increased to 86%. Primary nurse Dimple vinson.  Yusuf Miller, [facility].

## 2022-10-02 NOTE — PLAN OF CARE
Problem: Discharge Planning  Goal: Discharge to home or other facility with appropriate resources  Outcome: Progressing  Flowsheets (Taken 10/2/2022 1541)  Discharge to home or other facility with appropriate resources:   Identify barriers to discharge with patient and caregiver   Arrange for needed discharge resources and transportation as appropriate     Problem: Pain  Goal: Verbalizes/displays adequate comfort level or baseline comfort level  Outcome: Progressing  Flowsheets (Taken 10/2/2022 1541)  Verbalizes/displays adequate comfort level or baseline comfort level:   Encourage patient to monitor pain and request assistance   Assess pain using appropriate pain scale  Note: Pain Assessment: None - Denies Pain  Pain Level: 0   Patient's Stated Pain Goal: 0 - No pain   Is pain goal met at this time?   No     Non-Pharmaceutical Pain Intervention(s): Rest      Problem: Safety - Adult  Goal: Free from fall injury  Outcome: Progressing  Flowsheets (Taken 10/2/2022 1541)  Free From Fall Injury: Instruct family/caregiver on patient safety     Problem: ABCDS Injury Assessment  Goal: Absence of physical injury  Outcome: Progressing  Flowsheets (Taken 10/2/2022 1541)  Absence of Physical Injury: Implement safety measures based on patient assessment     Problem: Respiratory - Adult  Goal: Achieves optimal ventilation and oxygenation  Outcome: Progressing  Flowsheets (Taken 10/2/2022 1541)  Achieves optimal ventilation and oxygenation:   Assess for changes in respiratory status   Assess for changes in mentation and behavior     Problem: Skin/Tissue Integrity - Adult  Goal: Skin integrity remains intact  Outcome: Progressing  Flowsheets (Taken 10/2/2022 1541)  Skin Integrity Remains Intact:   Monitor for areas of redness and/or skin breakdown   Assess vascular access sites hourly     Problem: Infection - Adult  Goal: Absence of infection at discharge  Outcome: Progressing  Flowsheets (Taken 10/2/2022 1541)  Absence of infection at discharge:   Assess and monitor for signs and symptoms of infection   Monitor lab/diagnostic results  Goal: Absence of infection during hospitalization  Outcome: Progressing  Flowsheets (Taken 10/2/2022 1541)  Absence of infection during hospitalization:   Assess and monitor for signs and symptoms of infection   Monitor lab/diagnostic results     Problem: Metabolic/Fluid and Electrolytes - Adult  Goal: Electrolytes maintained within normal limits  Outcome: Progressing  Flowsheets (Taken 10/2/2022 1541)  Electrolytes maintained within normal limits:   Monitor labs and assess patient for signs and symptoms of electrolyte imbalances   Administer electrolyte replacement as ordered     Problem: Hematologic - Adult  Goal: Maintains hematologic stability  Outcome: Progressing  Flowsheets (Taken 10/2/2022 1541)  Maintains hematologic stability: Assess for signs and symptoms of bleeding or hemorrhage     Problem: Skin/Tissue Integrity  Goal: Absence of new skin breakdown  Description: 1. Monitor for areas of redness and/or skin breakdown  2. Assess vascular access sites hourly  3. Every 4-6 hours minimum:  Change oxygen saturation probe site  4. Every 4-6 hours:  If on nasal continuous positive airway pressure, respiratory therapy assess nares and determine need for appliance change or resting period.   Outcome: Progressing     Problem: Chronic Conditions and Co-morbidities  Goal: Patient's chronic conditions and co-morbidity symptoms are monitored and maintained or improved  Outcome: Progressing  Flowsheets (Taken 10/2/2022 1541)  Care Plan - Patient's Chronic Conditions and Co-Morbidity Symptoms are Monitored and Maintained or Improved: Monitor and assess patient's chronic conditions and comorbid symptoms for stability, deterioration, or improvement     Problem: Nutrition Deficit:  Goal: Optimize nutritional status  Outcome: Progressing  Flowsheets (Taken 10/2/2022 1541)  Nutrient intake appropriate for improving, restoring, or maintaining nutritional needs:   Assess nutritional status and recommend course of action   Monitor oral intake, labs, and treatment plans   Care plan reviewed with patient. Patient verbalize understanding of the plan of care and contribute to goal setting.

## 2022-10-02 NOTE — PROGRESS NOTES
Reported off to primary nurse Robles Pryor. Pt appears to be resting comfortably in reclining chair w/ eyes closed. Wife at bedside. Denies any needs at this time. Call light and personal items within reach.  Suzanne Peralta, [facility] [place].

## 2022-10-03 ENCOUNTER — APPOINTMENT (OUTPATIENT)
Dept: GENERAL RADIOLOGY | Age: 57
DRG: 163 | End: 2022-10-03
Payer: MEDICARE

## 2022-10-03 LAB
ANAEROBIC CULTURE: NORMAL
ANION GAP SERPL CALCULATED.3IONS-SCNC: 13 MEQ/L (ref 8–16)
BLOOD CULTURE, ROUTINE: NORMAL
BODY FLUID CULTURE, STERILE: NORMAL
BUN BLDV-MCNC: 73 MG/DL (ref 7–22)
CALCIUM SERPL-MCNC: 8.9 MG/DL (ref 8.5–10.5)
CHLORIDE BLD-SCNC: 97 MEQ/L (ref 98–111)
CO2: 23 MEQ/L (ref 23–33)
CREAT SERPL-MCNC: 10.3 MG/DL (ref 0.4–1.2)
ERYTHROCYTE [DISTWIDTH] IN BLOOD BY AUTOMATED COUNT: 12.6 % (ref 11.5–14.5)
ERYTHROCYTE [DISTWIDTH] IN BLOOD BY AUTOMATED COUNT: 48.1 FL (ref 35–45)
GFR SERPL CREATININE-BSD FRML MDRD: 6 ML/MIN/1.73M2
GLUCOSE BLD-MCNC: 105 MG/DL (ref 70–108)
GLUCOSE BLD-MCNC: 116 MG/DL (ref 70–108)
GLUCOSE BLD-MCNC: 96 MG/DL (ref 70–108)
GRAM STAIN RESULT: NORMAL
HCT VFR BLD CALC: 33.6 % (ref 42–52)
HEMOGLOBIN: 10.2 GM/DL (ref 14–18)
MCH RBC QN AUTO: 31.5 PG (ref 26–33)
MCHC RBC AUTO-ENTMCNC: 30.4 GM/DL (ref 32.2–35.5)
MCV RBC AUTO: 103.7 FL (ref 80–94)
PATHOLOGIST REVIEW: ABNORMAL
PLATELET # BLD: 92 THOU/MM3 (ref 130–400)
PMV BLD AUTO: 10.4 FL (ref 9.4–12.4)
POTASSIUM SERPL-SCNC: 4.5 MEQ/L (ref 3.5–5.2)
POTASSIUM SERPL-SCNC: 5.9 MEQ/L (ref 3.5–5.2)
RBC # BLD: 3.24 MILL/MM3 (ref 4.7–6.1)
SCAN OF BLOOD SMEAR: NORMAL
SODIUM BLD-SCNC: 133 MEQ/L (ref 135–145)
WBC # BLD: 3.7 THOU/MM3 (ref 4.8–10.8)

## 2022-10-03 PROCEDURE — 2060000000 HC ICU INTERMEDIATE R&B

## 2022-10-03 PROCEDURE — 97165 OT EVAL LOW COMPLEX 30 MIN: CPT

## 2022-10-03 PROCEDURE — 6370000000 HC RX 637 (ALT 250 FOR IP): Performed by: STUDENT IN AN ORGANIZED HEALTH CARE EDUCATION/TRAINING PROGRAM

## 2022-10-03 PROCEDURE — 82948 REAGENT STRIP/BLOOD GLUCOSE: CPT

## 2022-10-03 PROCEDURE — 71045 X-RAY EXAM CHEST 1 VIEW: CPT

## 2022-10-03 PROCEDURE — 80048 BASIC METABOLIC PNL TOTAL CA: CPT

## 2022-10-03 PROCEDURE — 90935 HEMODIALYSIS ONE EVALUATION: CPT

## 2022-10-03 PROCEDURE — 97530 THERAPEUTIC ACTIVITIES: CPT

## 2022-10-03 PROCEDURE — 99232 SBSQ HOSP IP/OBS MODERATE 35: CPT | Performed by: INTERNAL MEDICINE

## 2022-10-03 PROCEDURE — 36415 COLL VENOUS BLD VENIPUNCTURE: CPT

## 2022-10-03 PROCEDURE — 2580000003 HC RX 258: Performed by: PHYSICIAN ASSISTANT

## 2022-10-03 PROCEDURE — 99233 SBSQ HOSP IP/OBS HIGH 50: CPT | Performed by: INTERNAL MEDICINE

## 2022-10-03 PROCEDURE — 6370000000 HC RX 637 (ALT 250 FOR IP): Performed by: PHYSICIAN ASSISTANT

## 2022-10-03 PROCEDURE — 85027 COMPLETE CBC AUTOMATED: CPT

## 2022-10-03 PROCEDURE — 6370000000 HC RX 637 (ALT 250 FOR IP): Performed by: INTERNAL MEDICINE

## 2022-10-03 PROCEDURE — 6360000002 HC RX W HCPCS: Performed by: PHYSICIAN ASSISTANT

## 2022-10-03 PROCEDURE — 94660 CPAP INITIATION&MGMT: CPT

## 2022-10-03 PROCEDURE — 90935 HEMODIALYSIS ONE EVALUATION: CPT | Performed by: INTERNAL MEDICINE

## 2022-10-03 PROCEDURE — 84132 ASSAY OF SERUM POTASSIUM: CPT

## 2022-10-03 RX ADMIN — CLONIDINE HYDROCHLORIDE 0.3 MG: 0.2 TABLET ORAL at 19:49

## 2022-10-03 RX ADMIN — HEPARIN SODIUM 5000 UNITS: 5000 INJECTION INTRAVENOUS; SUBCUTANEOUS at 05:39

## 2022-10-03 RX ADMIN — HEPARIN SODIUM 5000 UNITS: 5000 INJECTION INTRAVENOUS; SUBCUTANEOUS at 13:56

## 2022-10-03 RX ADMIN — FERROUS SULFATE TAB 325 MG (65 MG ELEMENTAL FE) 325 MG: 325 (65 FE) TAB at 12:44

## 2022-10-03 RX ADMIN — SODIUM CHLORIDE, PRESERVATIVE FREE 10 ML: 5 INJECTION INTRAVENOUS at 12:44

## 2022-10-03 RX ADMIN — VERAPAMIL HYDROCHLORIDE 240 MG: 240 TABLET, FILM COATED, EXTENDED RELEASE ORAL at 06:46

## 2022-10-03 RX ADMIN — ATORVASTATIN CALCIUM 40 MG: 40 TABLET, FILM COATED ORAL at 19:49

## 2022-10-03 RX ADMIN — HEPARIN SODIUM 5000 UNITS: 5000 INJECTION INTRAVENOUS; SUBCUTANEOUS at 22:10

## 2022-10-03 RX ADMIN — FAMOTIDINE 20 MG: 20 TABLET ORAL at 12:44

## 2022-10-03 RX ADMIN — ISOSORBIDE MONONITRATE 120 MG: 60 TABLET, EXTENDED RELEASE ORAL at 06:46

## 2022-10-03 RX ADMIN — SERTRALINE 100 MG: 100 TABLET, FILM COATED ORAL at 12:44

## 2022-10-03 RX ADMIN — ASPIRIN 81 MG: 81 TABLET, COATED ORAL at 12:44

## 2022-10-03 RX ADMIN — SODIUM ZIRCONIUM CYCLOSILICATE 10 G: 10 POWDER, FOR SUSPENSION ORAL at 14:36

## 2022-10-03 RX ADMIN — HYDROCODONE BITARTRATE AND ACETAMINOPHEN 2 TABLET: 5; 325 TABLET ORAL at 02:30

## 2022-10-03 RX ADMIN — FLUTICASONE PROPIONATE 1 SPRAY: 50 SPRAY, METERED NASAL at 12:46

## 2022-10-03 RX ADMIN — CLONIDINE HYDROCHLORIDE 0.3 MG: 0.2 TABLET ORAL at 13:55

## 2022-10-03 RX ADMIN — CLONIDINE HYDROCHLORIDE 0.3 MG: 0.2 TABLET ORAL at 06:46

## 2022-10-03 RX ADMIN — Medication 1 TABLET: at 12:44

## 2022-10-03 RX ADMIN — SODIUM CHLORIDE, PRESERVATIVE FREE 10 ML: 5 INJECTION INTRAVENOUS at 19:50

## 2022-10-03 RX ADMIN — DOXAZOSIN 8 MG: 4 TABLET ORAL at 19:49

## 2022-10-03 RX ADMIN — HYDROCODONE BITARTRATE AND ACETAMINOPHEN 1 TABLET: 5; 325 TABLET ORAL at 23:22

## 2022-10-03 RX ADMIN — FOLIC ACID 1000 MCG: 1 TABLET ORAL at 12:44

## 2022-10-03 ASSESSMENT — ENCOUNTER SYMPTOMS
BACK PAIN: 0
VOMITING: 0
SHORTNESS OF BREATH: 0
NAUSEA: 0
PHOTOPHOBIA: 0
CHEST TIGHTNESS: 0
TROUBLE SWALLOWING: 0
COLOR CHANGE: 0
ABDOMINAL PAIN: 0
WHEEZING: 0

## 2022-10-03 ASSESSMENT — PAIN DESCRIPTION - LOCATION
LOCATION: RIB CAGE
LOCATION: RIB CAGE

## 2022-10-03 ASSESSMENT — PAIN SCALES - GENERAL
PAINLEVEL_OUTOF10: 8
PAINLEVEL_OUTOF10: 0
PAINLEVEL_OUTOF10: 6

## 2022-10-03 ASSESSMENT — PAIN DESCRIPTION - DESCRIPTORS
DESCRIPTORS: ACHING
DESCRIPTORS: SHARP

## 2022-10-03 ASSESSMENT — PAIN DESCRIPTION - ORIENTATION
ORIENTATION: RIGHT
ORIENTATION: RIGHT

## 2022-10-03 NOTE — CARE COORDINATION
Collaborative Discharge Planning    Percy Thacker  :  1965  MRN:  [id number]    ADMIT DATE:  2022      Discharge Planning Discharge Planning  Type of Residence: House  Living Arrangements: Spouse/Significant Other  Support Systems: Spouse/Significant Other  Current Services Prior To Admission: None  Potential Assistance Needed: N/A  DME Ordered?: No  Potential Assistance Purchasing Medications: Yes  Type of Home Care Services: None  Patient expects to be discharged to[de-identified] House  Follow Up Appointment: Best Day/Time : Monday AM  One/Two Story Residence: [facility] level  # of Interior Steps: 12  Height of Each Step (in): 6.5 [employer]  Interior Rails: Right  Lift Chair Available: No  History of falls?: No  White Board Notes /Social Work Whiteboard Notes  /Social Work Whiteboard: 10/3; CM; plans home w spouse Dimitri Cerna, has HD T-R-S w L [address] T-R-S w AVF; therapy following    Discharge Plan Home with family  plans home w spouse Dimitri Cerna independently as PTA when medically cleared, current w [facility] M-T-F [id number] chair time; B/W Cab transports to HD; has AVF; therapy following; monitor oxygen needs; prefers [facility] after choices offered  Discharge Milestones and Delays: Clinical status    ESRD/Loculated Right Pleural Effusion/Pneumothorax     Right Chest Tube Output = 90 ml/24h     Creatinine 10.3, PLT 92, Na+ 133, K+ 5.9    Oxygen 3L    SIGNED:  Larene Lefort, RN   10/3/2022, 1:21 PM

## 2022-10-03 NOTE — PLAN OF CARE
Problem: Discharge Planning  Goal: Discharge to home or other facility with appropriate resources  10/2/2022 2219 by Symone Benavidez RN  Outcome: Progressing  Flowsheets (Taken 10/2/2022 2219)  Discharge to home or other facility with appropriate resources:   Identify barriers to discharge with patient and caregiver   Identify discharge learning needs (meds, wound care, etc)     Problem: Pain  Goal: Verbalizes/displays adequate comfort level or baseline comfort level  10/2/2022 2219 by Symone Benavidez RN  Outcome: Progressing  Flowsheets (Taken 10/2/2022 2219)  Verbalizes/displays adequate comfort level or baseline comfort level:   Encourage patient to monitor pain and request assistance   Assess pain using appropriate pain scale   Implement non-pharmacological measures as appropriate and evaluate response     Problem: Safety - Adult  Goal: Free from fall injury  10/2/2022 2219 by Symone Benavidez RN  Outcome: Progressing  Flowsheets (Taken 10/2/2022 2219)  Free From Fall Injury: Instruct family/caregiver on patient safety  Note: Patient free from falls this shift. Problem: ABCDS Injury Assessment  Goal: Absence of physical injury  10/2/2022 2219 by Symone Benavidez RN  Outcome: Progressing  Flowsheets (Taken 10/2/2022 2219)  Absence of Physical Injury: Implement safety measures based on patient assessment  Note: Bed/chair alarm in use this shift.      Problem: Respiratory - Adult  Goal: Achieves optimal ventilation and oxygenation  10/2/2022 2219 by Symone Benavidez RN  Outcome: Progressing  Flowsheets (Taken 10/2/2022 2219)  Achieves optimal ventilation and oxygenation:   Assess for changes in respiratory status   Assess for changes in mentation and behavior   Position to facilitate oxygenation and minimize respiratory effort   Oxygen supplementation based on oxygen saturation or arterial blood gases   Assess and instruct to report shortness of breath or any respiratory difficulty   Encourage broncho-pulmonary hygiene including cough, deep breathe, incentive spirometry     Problem: Cardiovascular - Adult  Goal: Maintains optimal cardiac output and hemodynamic stability  10/2/2022 2219 by Vidhya Woodruff RN  Outcome: Progressing  Flowsheets (Taken 10/2/2022 2219)  Maintains optimal cardiac output and hemodynamic stability:   Monitor blood pressure and heart rate   Monitor urine output and notify Licensed Independent Practitioner for values outside of normal range   Assess for signs of decreased cardiac output     Problem: Skin/Tissue Integrity - Adult  Goal: Skin integrity remains intact  10/2/2022 2219 by Vidhya Woodruff RN  Outcome: Progressing  Flowsheets (Taken 10/2/2022 2219)  Skin Integrity Remains Intact:   Monitor for areas of redness and/or skin breakdown   Assess vascular access sites hourly     Problem: Infection - Adult  Goal: Absence of infection during hospitalization  10/2/2022 2219 by Vidhya Woodruff RN  Outcome: Progressing  Flowsheets (Taken 10/2/2022 2219)  Absence of infection during hospitalization:   Assess and monitor for signs and symptoms of infection   Monitor lab/diagnostic results   Instruct and encourage patient and family to use good hand hygiene technique   Monitor all insertion sites i.e., indwelling lines, tubes and drains     Problem: Metabolic/Fluid and Electrolytes - Adult  Goal: Electrolytes maintained within normal limits  10/2/2022 2219 by Vidhya Woodruff RN  Outcome: Progressing  Flowsheets (Taken 10/2/2022 2219)  Electrolytes maintained within normal limits: Monitor labs and assess patient for signs and symptoms of electrolyte imbalances     Problem: Hematologic - Adult  Goal: Maintains hematologic stability  10/2/2022 2219 by Vidhya Woodruff RN  Outcome: Progressing  Flowsheets (Taken 10/2/2022 2219)  Maintains hematologic stability: Assess for signs and symptoms of bleeding or hemorrhage     Problem: Skin/Tissue Integrity  Goal: Absence of new skin breakdown  Description: 1. Monitor for areas of redness and/or skin breakdown  2. Assess vascular access sites hourly  3. Every 4-6 hours minimum:  Change oxygen saturation probe site  4. Every 4-6 hours:  If on nasal continuous positive airway pressure, respiratory therapy assess nares and determine need for appliance change or resting period. 10/2/2022 2219 by Jac Potts RN  Outcome: Progressing  Note: No new skin breakdown this shift. Problem: Chronic Conditions and Co-morbidities  Goal: Patient's chronic conditions and co-morbidity symptoms are monitored and maintained or improved  10/2/2022 2219 by Jac Potts RN  Outcome: Progressing  Flowsheets (Taken 10/2/2022 2219)  Care Plan - Patient's Chronic Conditions and Co-Morbidity Symptoms are Monitored and Maintained or Improved:   Monitor and assess patient's chronic conditions and comorbid symptoms for stability, deterioration, or improvement   Collaborate with multidisciplinary team to address chronic and comorbid conditions and prevent exacerbation or deterioration   Update acute care plan with appropriate goals if chronic or comorbid symptoms are exacerbated and prevent overall improvement and discharge     Problem: Nutrition Deficit:  Goal: Optimize nutritional status  10/2/2022 2219 by Jac Potts RN  Outcome: Progressing  Flowsheets (Taken 10/2/2022 2219)  Nutrient intake appropriate for improving, restoring, or maintaining nutritional needs:   Assess nutritional status and recommend course of action   Monitor oral intake, labs, and treatment plans   Care plan reviewed with patient and family. Patient and family verbalize understanding of the plan of care and contribute to goal setting.

## 2022-10-03 NOTE — PROGRESS NOTES
CRITICAL CARE PROGRESS NOTE      Patient:  Raghu He    Unit/Bed:4K-14/014-A  YOB: 1965  MRN: [id number]   PCP: Coreen Umana MD  Date of Admission: 9/28/2022  Chief Complaint:-Acute Hypoxic respiratory failure    Assessment and Plan:      Acute hypoxic respiratory failure: Weaned to 3 L nasal cannula. Improved with chest tube placement. Chest tube to remain in place. Right pleural effusion: chest tube placed 9/28. Place back to suction. Plans for tPA and dornase injection 10/4. Right entrapped lung: Plans for chemical pneumolysis 10/4. Maintain chest tube in place. Cytology negative for malignant cells at this time  History of thoracic and aortic aneurysm: status post stent graft 7/2018. Diabetes mellitus: Diet controlled  Hyperparathyroidism: Secondary to renal failure  Depression: Zoloft  GERD: Pepcid  History of substance abuse: History of cocaine use  Tobacco abuse:  every day smoker, recommend smoking cessation  Hyperlipidemia: Lipitor  Hypertension: Imdur, Cardura, Catapres. INITIAL H AND P AND ICU COURSE:  Raine Downey is a 61-year-old black male who presented to [facility] on 9/28/2022 with worsening shortness of breath. He has a past medical history of current every day smoker, AAA, stage renal disease, cocaine abuse, depression, diabetes mellitus, hyperlipidemia, hypertension, medical noncompliance, PTSD. Per report patient presented to the emergency department on 9/28 with worsening shortness of breath. Chest x-ray showed large right pleural effusion. Patient was placed on BiPAP. He was admitted to stepdown where he underwent a chest tube placement on 9/28 with 2 L of serosanguineous fluid. Dialysis was also performed on 9/28. He underwent albumin administration on 9/29. Repeat dialysis on 9/30. Chest tube drainage was noted for exudative effusion. Past Medical History: per HPI  Family History:  Mother: Cancer  Social History: Current every day smoker, denies alcohol and drug use. Review of Systems   Constitutional:  Negative for fatigue and fever. HENT:  Negative for sneezing and trouble swallowing. Eyes:  Negative for photophobia and visual disturbance. Respiratory:  Negative for chest tightness, shortness of breath and wheezing. Cardiovascular:  Negative for chest pain and leg swelling. Gastrointestinal:  Negative for abdominal pain, nausea and vomiting. Endocrine: Negative for polydipsia. Genitourinary:  Positive for decreased urine volume. Negative for flank pain. Musculoskeletal:  Negative for back pain and neck pain. Skin:  Negative for color change, pallor and rash. Allergic/Immunologic: Negative for food allergies. Neurological:  Negative for dizziness, weakness and numbness. Hematological:  Negative for adenopathy. Psychiatric/Behavioral:  Negative for agitation and confusion. The patient is nervous/anxious. Scheduled Meds:   sodium zirconium cyclosilicate  10 g Oral Once    aspirin  81 mg Oral Daily    atorvastatin  40 mg Oral Nightly    cloNIDine  0.3 mg Oral TID    doxazosin  8 mg Oral Nightly    famotidine  20 mg Oral Daily    ferrous sulfate  325 mg Oral Daily with breakfast    folic acid  0,567 mcg Oral Daily    fluticasone  1 spray Nasal Daily    isosorbide mononitrate  120 mg Oral Daily    sertraline  100 mg Oral Daily    verapamil  240 mg Oral Daily    multivitamin  1 tablet Oral Daily    sodium chloride flush  10 mL IntraVENous 2 times per day    heparin (porcine)  5,000 Units SubCUTAneous 3 times per day     Continuous Infusions:   sodium chloride         PHYSICAL EXAMINATION:  T:  97.6. P:  84. RR:  14. B/P:  122/62. FiO2:  3. O2 Sat:  97.  I/O:  640/90  Body mass index is 27.56 kg/m². GCS:   15  General:   Acute on chronically  ill-appearing black male  HEENT:  normocephalic and atraumatic. No scleral icterus. PERR  Neck: supple. No Thyromegaly. Lungs: clear to auscultation.   No retractions  Cardiac: RRR. No JVD. Abdomen: soft. Nontender. Extremities:  No clubbing, cyanosis, or edema x 4. Vasculature: capillary refill < 3 seconds. Palpable dorsalis pedis pulses. Skin:  warm and dry. Psych:  Alert and oriented x3. Affect appropriate  Lymph:  No supraclavicular adenopathy. Neurologic:  No focal deficit. No seizures. Data: (All radiographs, tracings, PFTs, and imaging are personally viewed and interpreted unless otherwise noted). Sodium 133, potassium 5.9, chloride 97, CO2 23, BUN 73, creatinine 10.3, anion gap 13.0, glucose 105  WBC 8.7, hemoglobin 10.2, hematocrit 92  Telemetry shows NSR   X-ray 10/2/2022 reports stable moderate right and small left effusions. No definite residual pneumothorax. Case and plan discussed with Dr. Kevin Charles. Electronically signed by WADE Solis CNP  CRITICAL CARE SPECIALIST   Patient seen by me including key components of medical care. Case discussed with NP. I discussed risks and benefits with chemical pneumonlysis. I discussed alternatives to chemical pneumolysis with their risks and benefits. He consents to chemical pneumolysis. Still awaiting cytology results. .  Italicized font, if present,  represents changes to the note made by me. CC time 35 minutes. Time was discontiguous. Time does not include procedure. Time does include my direct assessment of the patient and coordination of care. Time represents more than 50% of the time involved with patient care by the [address] [place] [address] team.  Electronically signed by Berkley Charles MD.

## 2022-10-03 NOTE — FLOWSHEET NOTE
Stable 4 hour treatment complete. Removed 2 liters of fluid as per order. Tolerated fluid removal well. Pressure held to needle sites times ten minutes each. Dressing clean, dry and intact. Report given to primary RN. Treatment record printed for scanning into EMR. 10/03/22 0730 10/03/22 1200   Vital Signs   /62 128/63   Temp 97.6 °F (36.4 °C) 97.8 °F (36.6 °C)   Heart Rate 84 77   Resp 14 17   SpO2 97 % 97 %   Weight 220 lb 7.4 oz (100 kg) 216 lb 0.8 oz (98 kg)   Weight Method Bed scale Bed scale   Percent Weight Change -5.14 -2   Post-Hemodialysis Assessment   Post-Treatment Procedures  --  Blood returned   Machine Disinfection Process  --  Acid/Vinegar Clean;Heat Disinfect; Exterior Machine Disinfection   Blood Volume Processed (Liters)  --  101.6 l/min   Dialyzer Clearance  --  Lightly streaked   Duration of Treatment (minutes)  --  240 minutes   Heparin Amount Administered During Treatment (mL)  --  0 mL   Hemodialysis Intake (ml)  --  400 ml   Hemodialysis Output (ml)  --  2400 ml   NET Removed (ml)  --  2000   Tolerated Treatment  --  Good

## 2022-10-03 NOTE — PROGRESS NOTES
Internal Medicine Resident Progress Note    Patient:  Lopez Zafar    YOB: 1965  Unit/Bed:4K-14/014-A  MRN: [id number]    Acct: [de-identified]   PCP: Jolie Fabian MD    Date of Admission: 9/28/2022      Assessment/Plan:  Pneumothorax, right  -Noted on CTA of chest with and without contrast on 10/02  - Chest tube in place  - 10/03 CXR confirming persistence less than 10% in the right apex    Pleural effusion, right  - S/p chest tube 09/28  - 80 cc of drainage overnight  - Continue to monitor output  - Intensivist to manage; CT consulted on admission  - Suction discontinued on 10/01  - Patient seen and evaluated by intensivist who placed chest tube on 10/03.   Plan for tPA and dornase injection on 10/04    Right lower lobe infiltrate  - Noted on CTA of the chest on 10/02  - Likely multifactorial; atelectasis vs pleural effusion vs pulmonary edema  - Chest tube in place  - Low clinical suspicion for infectious etiology  -Incentive spirometry, bronchodilation per intensivist recommendations  - Repeat imaging to assess for interval changes per intensivist    Thoracic spondylosis  - Noted on CTA of the chest on 10/02  - PT/OT consulted    ESRD, on hemodialysis  - Patient on MWF dialysis schedule  - Nephrology following  - Judicious IV fluids and diuresis per nephrology recommendations  - Strict I's and O's  - 10/03 dialysis with 2 L of fluid removed    Hyperkalemia  - Likely secondary to ESRD  - Patient on MWF dialysis schedule  - Cristi Suarez X1 if persistently elevated after dialysis  - Trend BMP    Entrapped Lung, Right  - Plans for chemical pneumolysis 10/04 per intensivist  -Imaging per intensivist    Hx HTN  - Vital signs stable  - Continue clonidine, doxazosin, and verapamil with parameters    Type 2 diabetes mellitus  - HbA1c 5.0 on 2/2022  - Diet controlled  - Point-of-care glucose checks to maintain at [id number]    Systolic heart failure with preserved ejection fraction  - Echo 9/29 increased left ventricular wall thickness with EF 60%  - Strict I's and O's, daily weights  - Judicious IV fluids and diuresis per nephrology  - Continue Imdur    Hypertrophic cardiomyopathy  - Echo 9/29 showing severe left ventricular concentric hypertrophy as well as dilation of the left atrium  - Continue aspirin, statin, isosorbide mononitrate, verapamil  - Strict I's and O's, daily weights  - Judicious IV fluids and diuresis per nephrology    Hyperphosphatemia  - Continue low phosphorus diet  - Patient's home phosphorus binder resumed  - Trend phosphorus and BMP    PTSD  - Continue sertraline, Atarax, trazodone  - Mood stable    Unspecified depressive disorder  - Continue sertraline  - Mood stable    GERD  - Continue PPI    Polysubstance abuse  - Patient counseled on substance abuse cessation    HLD  - Continue atorvastatin    Anemia of chronic disease  - Trend CBC  - Continue iron and folate supplementation    REZA  - Patient tolerating BiPAP at night  - Recommendation for outpatient follow-up for PAP titration with sleep clinic    Hx Thoracic Aneurysm  Hx AAA s/p aortic stent graft 7/2018  Hx COVID-19 pneumonia  Hx atrial flutter  Hx peripheral vascular disease  Hx hypertensive emergency  - Noted      Expected discharge date: TBD    Disposition:   [x] Home  [] TCU  [] Rehab  [] Psych  [] SNF  [] [facility]  [] Other-    ===================================================================      Chief Complaint: Shortness of breath    Hospital Course: This is a 51-year-old -American male who presented to [address] [place] [address] on 09/28/2022 with worsening shortness of breath at rest and with exertion. CXR showed large right pleural effusion and patient required PAP therapy for adequate oxygenation. Patient was admitted and chest tube placed on 09/28 with 2 L of serosanguineous fluid drainage. Dialysis also performed at 09/28 with 2 L of fluid. Repeat dialysis performed on 09/29 with albumin administration.   Repeat dialysis performed on 09/30/2022. Pleural fluid analysis was consistent with exudative effusion with testing for malignancy pending. Suction for chest tube was discontinued on 10/01. Subjective (past 24 hours):   Patient seen and evaluated at the bedside. He denies chest pain, fever, chills, nausea, vomiting, and diarrhea. He states that he is eating well and denies any dysphagia. He states that he is ambulating to and from the restroom without difficulty. He states his last bowel movement was yesterday. He endorses a pulling sensation where his chest tube is. Dialysis at 0700 on 10/03/2022. He denies any other acute symptoms or concerns. ROS: reviewed complete ROS unchanged unless otherwise stated in hospital course/subjective portion.        Medications:  Reviewed    Infusion Medications    sodium chloride       Scheduled Medications    aspirin  81 mg Oral Daily    atorvastatin  40 mg Oral Nightly    cloNIDine  0.3 mg Oral TID    doxazosin  8 mg Oral Nightly    famotidine  20 mg Oral Daily    ferrous sulfate  325 mg Oral Daily with breakfast    folic acid  8,843 mcg Oral Daily    fluticasone  1 spray Nasal Daily    isosorbide mononitrate  120 mg Oral Daily    sertraline  100 mg Oral Daily    verapamil  240 mg Oral Daily    multivitamin  1 tablet Oral Daily    sodium chloride flush  10 mL IntraVENous 2 times per day    heparin (porcine)  5,000 Units SubCUTAneous 3 times per day     PRN Meds: hydrOXYzine HCl, traZODone, sodium chloride flush, sodium chloride, ondansetron **OR** ondansetron, polyethylene glycol, acetaminophen, HYDROcodone 5 mg - acetaminophen **OR** HYDROcodone 5 mg - acetaminophen, morphine        Intake/Output Summary (Last 24 hours) at 10/3/2022 0754  Last data filed at 10/3/2022 0302  Gross per 24 hour   Intake 640 ml   Output 90 ml   Net 550 ml       Exam:  /63   Pulse 87   Temp 97.4 °F (36.3 °C) (Oral)   Resp 16   Ht 6' 3\" (1.905 m)   Wt 232 lb 6.4 oz (105.4 kg) SpO2 95%   BMI 29.05 kg/m²     General: No distress, appears stated age. Eyes:  PERRL. Conjunctivae/corneas clear. HENT: Head normal appearing. Nares normal. Oral mucosa moist.  Hearing intact. Neck: Supple, with full range of motion. Trachea midline. No gross JVD appreciated. Respiratory: Diminished breath sounds bilaterally. Crackles in the right lower lobe. No wheezes or rhonchi. Chest tube in place draining serosanguineous fluid. Nasal cannula in place. BiPAP at bedside. Cardiovascular: Normal rate, regular rhythm with normal S1/S2 without murmurs. No lower extremity edema. Abdomen: Soft, non-tender, non-distended with normal bowel sounds. Musculoskeletal: No joint swelling or tenderness. Normal tone. No abnormal movements. Skin: Warm and dry. No rashes or lesions. Neurologic:  No focal sensory/motor deficits in the upper or lower extremities. Cranial nerves:  grossly non-focal 2-12. Psychiatric: Alert and oriented, normal insight and thought content. Capillary Refill: Brisk,< 3 seconds. Peripheral Pulses: +2 palpable, equal bilaterally. Labs:   Recent Labs     10/01/22  0406 10/02/22  0340 10/03/22  0443   WBC 5.0 4.5* 3.7*   HGB 10.7* 10.5* 10.2*   HCT 36.6* 35.0* 33.6*   * 150  --      Recent Labs     10/01/22  0406 10/02/22  0340 10/02/22  0836 10/02/22  1440 10/03/22  0443    135  --   --  133*   K 5.1 5.6* 5.2 5.6* 5.9*    98  --   --  97*   CO2 24 22*  --   --  23   BUN 30* 52*  --   --  73*   CREATININE 5.7* 8.2*  --   --  10.3*   CALCIUM 8.9 9.0  --   --  8.9   PHOS 5.1*  --   --   --   --      No results for input(s): AST, ALT, BILIDIR, BILITOT, ALKPHOS in the last 72 hours. No results for input(s): INR in the last 72 hours. No results for input(s): [place] in the last 72 hours. No results for input(s): PROCAL in the last 72 hours.    Lab Results   Component Value Date/Time    NITRU NEGATIVE 04/03/2018 07:45 PM    WBCUA 2-4 04/03/2018 07:45 PM    BACTERIA NONE 04/03/2018 07:45 PM    RBCUA 5-10 04/03/2018 07:45 PM    BLOODU SMALL 04/03/2018 07:45 PM    SPECGRAV 1.014 03/07/2018 09:10 PM    GLUCOSEU NEGATIVE 04/03/2018 07:45 PM       Radiology (48 hours):  CTA CHEST W WO CONTRAST    Result Date: 10/2/2022   1. Endoluminal graft in the thoracic aorta. This appears slightly smaller than on remote study dated 2/26/2020. There is no definite evidence of an endoleak. Please correlate clinically. 2. Cardiomegaly. Mitral valve calcification. 3. Right-sided chest tube. Small pneumothorax and effusion at the right lung base. Right lower lobe infiltrate. 4. Evidence for granulomatous disease in the right lower lobe, right hilum and mediastinum. 5. Thoracic spondylosis. 6. Old granulomatous disease in the spleen. 7. Small kidneys bilaterally. **This report has been created using voice recognition software. It may contain minor errors which are inherent in voice recognition technology. ** Final report electronically signed by DR Ruben Son on 10/2/2022 11:48 AM    XR CHEST PORTABLE    Result Date: 10/2/2022  Impression: Stable moderate right and small left effusions. No definite residual pneumothorax. Stable right basilar atelectasis vs infiltrate. This document has been electronically signed by: Lennox Look, MD on 10/02/2022 05:33 AM    XR CHEST PORTABLE    Result Date: 10/1/2022  1. Stable chest x-ray, no interval change since previous study dated 28th of September 2022. Mago Mace **This report has been created using voice recognition software. It may contain minor errors which are inherent in voice recognition technology. ** Final report electronically signed by DR Ruben Son on 10/1/2022 11:48 AM       DVT prophylaxis:    [] Lovenox  [] SCDs  [x] SQ Heparin  [] Encourage ambulation   [] Already on Anticoagulation       Diet: ADULT DIET; Regular; 5 carb choices (75 gm/meal); Low Fat/Low Chol/High Fiber/2 gm Na; Low Sodium (2 gm);  Low Potassium (Less than 3000 mg/day);  Low Phosphorus (Less than 1000 mg)  Code Status: Full Code  PT/OT: Consulted  Tele: Continuous  IVF: Per nephrology    Electronically signed by Claudell Ferrara, MD on 10/3/2022 at 7:54 AM    Case was discussed with Attending, Dr. Musa Larios

## 2022-10-03 NOTE — PLAN OF CARE
Problem: Discharge Planning  Goal: Discharge to home or other facility with appropriate resources  Outcome: Progressing  Flowsheets  Taken 10/3/2022 1316 by Francesco Downey RN  Discharge to home or other facility with appropriate resources:   Identify barriers to discharge with patient and caregiver   Arrange for needed discharge resources and transportation as appropriate   Identify discharge learning needs (meds, wound care, etc)   Refer to discharge planning if patient needs post-hospital services based on physician order or complex needs related to functional status, cognitive ability or social support system  Taken 10/3/2022 1234 by Jesús Jung RN  Discharge to home or other facility with appropriate resources:   Identify barriers to discharge with patient and caregiver   Identify discharge learning needs (meds, wound care, etc)     Problem: Pain  Goal: Verbalizes/displays adequate comfort level or baseline comfort level  Outcome: Progressing  Flowsheets (Taken 10/3/2022 1316)  Verbalizes/displays adequate comfort level or baseline comfort level:   Encourage patient to monitor pain and request assistance   Assess pain using appropriate pain scale   Administer analgesics based on type and severity of pain and evaluate response   Implement non-pharmacological measures as appropriate and evaluate response     Problem: Safety - Adult  Goal: Free from fall injury  Outcome: Progressing  Flowsheets (Taken 10/3/2022 1316)  Free From Fall Injury:   Instruct family/caregiver on patient safety   Based on caregiver fall risk screen, instruct family/caregiver to ask for assistance with transferring infant if caregiver noted to have fall risk factors     Problem: ABCDS Injury Assessment  Goal: Absence of physical injury  Outcome: Progressing  Flowsheets (Taken 10/3/2022 1316)  Absence of Physical Injury: Implement safety measures based on patient assessment     Problem: Respiratory - Adult  Goal: Achieves optimal ventilation and oxygenation  10/3/2022 1316 by Kendy Verduzco RN  Outcome: Progressing  Flowsheets  Taken 10/3/2022 1316 by Kendy Verduzco RN  Achieves optimal ventilation and oxygenation:   Assess for changes in respiratory status   Assess for changes in mentation and behavior   Position to facilitate oxygenation and minimize respiratory effort   Oxygen supplementation based on oxygen saturation or arterial blood gases   Respiratory therapy support as indicated   Assess and instruct to report shortness of breath or any respiratory difficulty  Taken 10/3/2022 1234 by Donya Cruz RN  Achieves optimal ventilation and oxygenation:   Assess for changes in respiratory status   Assess for changes in mentation and behavior   Position to facilitate oxygenation and minimize respiratory effort  10/3/2022 0352 by Blessing Nicholson RCP  Outcome: Progressing     Problem: Cardiovascular - Adult  Goal: Maintains optimal cardiac output and hemodynamic stability  Outcome: Progressing  Flowsheets  Taken 10/3/2022 1316 by Kendy Verduzco RN  Maintains optimal cardiac output and hemodynamic stability:   Monitor blood pressure and heart rate   Monitor urine output and notify Licensed Independent Practitioner for values outside of normal range   Assess for signs of decreased cardiac output  Taken 10/3/2022 1234 by Donya Cruz RN  Maintains optimal cardiac output and hemodynamic stability:   Monitor blood pressure and heart rate   Monitor urine output and notify Licensed Independent Practitioner for values outside of normal range   Assess for signs of decreased cardiac output  Goal: Absence of cardiac dysrhythmias or at baseline  Outcome: Progressing  Flowsheets  Taken 10/3/2022 1316 by Kendy Verduzco RN  Absence of cardiac dysrhythmias or at baseline:   Monitor cardiac rate and rhythm   Administer antiarrhythmia medication and electrolyte replacement as ordered   Assess for signs of decreased cardiac output  Taken 10/3/2022 1234 by Kalpesh Acosta RN  Absence of cardiac dysrhythmias or at baseline:   Monitor cardiac rate and rhythm   Assess for signs of decreased cardiac output     Problem: Skin/Tissue Integrity - Adult  Goal: Skin integrity remains intact  Outcome: Progressing  Flowsheets  Taken 10/3/2022 1316 by Alvarez French RN  Skin Integrity Remains Intact:   Monitor for areas of redness and/or skin breakdown   Assess vascular access sites hourly  Taken 10/3/2022 1309 by Alvarez French RN  Skin Integrity Remains Intact:   Monitor for areas of redness and/or skin breakdown   Assess vascular access sites hourly  Taken 10/3/2022 1234 by Kalpesh Acosta RN  Skin Integrity Remains Intact:   Monitor for areas of redness and/or skin breakdown   Assess vascular access sites hourly     Problem: Infection - Adult  Goal: Absence of infection at discharge  Outcome: Progressing  Flowsheets  Taken 10/3/2022 1316 by Alvarez French RN  Absence of infection at discharge:   Assess and monitor for signs and symptoms of infection   Monitor lab/diagnostic results   Administer medications as ordered   Instruct and encourage patient and family to use good hand hygiene technique  Taken 10/3/2022 1234 by Kalpesh Acosta RN  Absence of infection at discharge:   Assess and monitor for signs and symptoms of infection   Monitor lab/diagnostic results   Monitor all insertion sites i.e., indwelling lines, tubes and drains  Goal: Absence of infection during hospitalization  Outcome: Progressing  Flowsheets  Taken 10/3/2022 1316 by Alvarez French RN  Absence of infection during hospitalization:   Assess and monitor for signs and symptoms of infection   Monitor lab/diagnostic results   Administer medications as ordered  Taken 10/3/2022 1234 by Kalpesh Acosta RN  Absence of infection during hospitalization:   Assess and monitor for signs and symptoms of infection   Monitor lab/diagnostic results   Monitor all insertion sites i.e., indwelling lines, tubes and drains     Problem: Metabolic/Fluid and Electrolytes - Adult  Goal: Electrolytes maintained within normal limits  Outcome: Progressing  Flowsheets  Taken 10/3/2022 1316 by Calhoun Hodgkin, RN  Electrolytes maintained within normal limits:   Monitor labs and assess patient for signs and symptoms of electrolyte imbalances   Administer electrolyte replacement as ordered   Monitor response to electrolyte replacements, including repeat lab results as appropriate  Taken 10/3/2022 1234 by Cristiana Smith RN  Electrolytes maintained within normal limits:   Monitor labs and assess patient for signs and symptoms of electrolyte imbalances   Administer electrolyte replacement as ordered   Monitor response to electrolyte replacements, including repeat lab results as appropriate     Problem: Hematologic - Adult  Goal: Maintains hematologic stability  Outcome: Progressing  Flowsheets  Taken 10/3/2022 1316 by Calhoun Hodgkin, RN  Maintains hematologic stability:   Assess for signs and symptoms of bleeding or hemorrhage   Monitor labs for bleeding or clotting disorders  Taken 10/3/2022 1234 by Cristiana Smith RN  Maintains hematologic stability:   Assess for signs and symptoms of bleeding or hemorrhage   Monitor labs for bleeding or clotting disorders     Problem: Neurosensory - Adult  Goal: Achieves maximal functionality and self care  Outcome: Progressing  Flowsheets  Taken 10/3/2022 1316 by Calhoun Hodgkin, RN  Achieves maximal functionality and self care:   Monitor swallowing and airway patency with patient fatigue and changes in neurological status   Encourage and assist patient to increase activity and self care with guidance from physical therapy/occupational therapy   Encourage visually impaired, hearing impaired and aphasic patients to use assistive/communication devices  Taken 10/3/2022 1234 by Cristiana Smith RN  Achieves maximal functionality and self care: Monitor swallowing and airway patency with patient fatigue and changes in neurological status   Encourage and assist patient to increase activity and self care with guidance from physical therapy/occupational therapy     Problem: Skin/Tissue Integrity  Goal: Absence of new skin breakdown  Description: 1. Monitor for areas of redness and/or skin breakdown  2. Assess vascular access sites hourly  3. Every 4-6 hours minimum:  Change oxygen saturation probe site  4. Every 4-6 hours:  If on nasal continuous positive airway pressure, respiratory therapy assess nares and determine need for appliance change or resting period. Outcome: Progressing  Note: No new skin breakdown this shift. Pt turned every 2 hours and as needed.         Problem: Chronic Conditions and Co-morbidities  Goal: Patient's chronic conditions and co-morbidity symptoms are monitored and maintained or improved  Outcome: Progressing  Flowsheets  Taken 10/3/2022 1316 by Noreen Edmond 34 - Patient's Chronic Conditions and Co-Morbidity Symptoms are Monitored and Maintained or Improved:   Monitor and assess patient's chronic conditions and comorbid symptoms for stability, deterioration, or improvement   Collaborate with multidisciplinary team to address chronic and comorbid conditions and prevent exacerbation or deterioration   Update acute care plan with appropriate goals if chronic or comorbid symptoms are exacerbated and prevent overall improvement and discharge  Taken 10/3/2022 1234 by Noreen Dietrich 34 - Patient's Chronic Conditions and Co-Morbidity Symptoms are Monitored and Maintained or Improved:   Monitor and assess patient's chronic conditions and comorbid symptoms for stability, deterioration, or improvement   Collaborate with multidisciplinary team to address chronic and comorbid conditions and prevent exacerbation or deterioration     Problem: Nutrition Deficit:  Goal: Optimize nutritional status  Outcome: Progressing  Flowsheets (Taken 10/3/2022 1316)  Nutrient intake appropriate for improving, restoring, or maintaining nutritional needs:   Assess nutritional status and recommend course of action   Monitor oral intake, labs, and treatment plans   Recommend appropriate diets, oral nutritional supplements, and vitamin/mineral supplements   Care plan reviewed with patient. Patient verbalize understanding of the plan of care and contribute to goal setting.

## 2022-10-03 NOTE — PLAN OF CARE
Problem: Respiratory - Adult  Goal: Achieves optimal ventilation and oxygenation  10/3/2022 0352 by Anmol Gutierrez RCP  Outcome: Progressing   Patient mutually agreed on goals.

## 2022-10-03 NOTE — PROGRESS NOTES
[facility] 62  INPATIENT OCCUPATIONAL THERAPY  [facility] ICU STEPDOWN TELEMETRY 4K  EVALUATION    Time:   Time In: 1600  Time Out: 1645  Timed Code Treatment Minutes: 30 Minutes  Minutes: 45          Date: 10/3/2022  Patient Name: Yasmine Cifuentes,   Gender: male      MRN: [id number]  : 1965  (56 y.o.)  Referring Practitioner: Dr. Josef Gresham MD  Diagnosis: Hypoxia  Additional Pertinent Hx: Pt presented to [address] [place] [address] on 2022 with worsening shortness of breath at rest and with exertion. CXR showed large right pleural effusion and patient required PAP therapy for adequate oxygenation. Patient was admitted and chest tube placed on  with 2 L of serosanguineous fluid drainage. Dialysis also performed at  with 2 L of fluid. Repeat dialysis performed on  with albumin administration. Repeat dialysis performed on 2022. Pleural fluid analysis was consistent with exudative effusion with testing for malignancy pending. Suction for chest tube was discontinued on 10/01. Restrictions/Precautions:  Restrictions/Precautions: Fall Risk    Subjective  Chart Reviewed: Yes, Orders, History and Physical  Family / Caregiver Present: Yes (Spouse present and supportive)    Subjective: Pleasant and cooperative  Comments: RN approved session. Pt reported mild back pain. Pt agreed to get up to the chair and eat his supper when it arrived. Comments / Details: Repositioning in the chair seemed to help his back pain.     Pain: 5/10: moderate pain in his R side at the pleural drainage tube    Vitals: Oxygen: Pt shows O2 saturation of 93% or higher when at rest. He desaturated to 88% briefly while standing before returning into the low 90s again after having sat down for a rest.    Social/Functional History:  Lives With: Spouse  Type of Home: House  Home Layout: Two level, Bed/Bath upstairs  Home Access: Stairs to enter with rails  Entrance Stairs - Number of Steps: 3+4 steps and another flight to get to floor with bedroom and bathroom   Bathroom Shower/Tub: Tub/Shower unit  Bathroom Toilet: Standard  Bathroom Accessibility: Accessible    Receives Help From: Family  ADL Assistance: Independent  Homemaking Assistance: Needs assistance  Homemaking Responsibilities: No  Ambulation Assistance: Independent  Transfer Assistance: Independent    Active : No  Patient's  Info: Spouse drives and pt takes a local taxi to the hemodialysis treatments  Occupation: Retired  Type of Occupation: Served in [employer] 39: Watching TV; doing yardwork  Additional Comments: Pt wakes early in the morning every day. He has an eary chair time for hemodialysis. He plans to do activities in the morning. Pt likes to soak in a tub but he also stands to rinse off using the shower. VISION:WFL    HEARING:  WFL    COGNITION: Decreased Recall and Decreased Safety Awareness; impulsive    RANGE OF MOTION:  Bilateral Upper Extremity:  WFL    STRENGTH:  Bilateral Upper Extremity:  Impaired - 3+/5 deltoid; 3+/5 pectoral; 4-/5 biceps and 4/5 triceps    SENSATION:   WFL    ADL:   Lower Extremity Dressing: Minimal Assistance. Assist provided for starting to don his slipper socks while at the edge of bed. Pt refused other ADLs at this time. BALANCE:  Sitting Balance:  Stand By Assistance. Donning slipper socks  Standing Balance: Contact Guard Assistance. Preparing to take steps    BED MOBILITY:  Rolling to Left: Stand By Assistance, with head of bed raised, with rail    Supine to Sit: Stand By Assistance, with head of bed raised, with rail      TRANSFERS:  Sit to Stand:  Stand By Assistance. From the edge of bed and from the rocking recliner chair with CGA. Stand to Sit: Contact Guard Assistance. To the recliner chair and to the rocking recliner. FUNCTIONAL MOBILITY:  Assistive Device: Rolling Walker  Assist Level:  Contact Guard Assistance.    Distance:  6 ft and 4 ft  Pt had even step and held onto the walker with no LOB noted. Help needed for management of his O2, pulse oxymeter and pleur ex drain reservoir. Activity Tolerance:  Patient tolerance of  treatment: fair. Pt had been SOB with standing and ambulating a short distance. Pt sat for a few minutes to rest before getting up to get into a regular recliner chair       Assessment:  Assessment: Patient would benefit from continued skilled OT services to address above deficits. He presents with Hypoxia. Pt has hx of DM, sleep apnea, depression, PTSD, COVID in 2020, ETOH and cocaine abuse. Pt has received hemodialysis for several years. Pt was not using any AD for ambulation. Pt was independent with self care. Pt did demonstrate bed mobility with SBA and transfers from the bed with SBA. He needed CGA for standing  up from a rocking recliner chair and CGA for stand to sit. Pt ambulated with CGA while using a rolling walker and having help with management of multiple lines. Cues for safety. Pt was using 3L of O2 throughout and had O2 saturation close to or > than 90% throughout. Performance deficits / Impairments: Decreased functional mobility , Decreased ADL status, Decreased endurance, Decreased strength, Decreased safe awareness  Prognosis: Good  REQUIRES OT FOLLOW-UP: Yes  Decision Making: Low Complexity    Treatment Initiated: Treatment and education initiated within context of evaluation. Evaluation time included review of current medical information, gathering information related to past medical, social and functional history, completion of standardized testing, formal and informal observation of tasks, assessment of data and development of plan of care and goals. Treatment time included skilled education and facilitation of tasks to increase safety and independence with ADL's for improved functional independence and quality of life. Pt demonstrated use of the incentive spirometer with no difficulty noted x 6 reps with pauses between each rep. Energy conservation was discussed with pt and spouse. Encouraged pt to plan his day around when he feels he has most of his energy- the morning, per pt. Pt also encouraged to not be in a hurry but to take his time when moving around so that he doesn't make himself too SOB where he can't recover quickly. A handout was provided to pt and spouse. Pt demonstrated understanding of positioning having effect on his use of energy as well as posture. Pt described taking a slow bath while in sitting but that he stands to rinse off after he is done bathing. Discharge Recommendations:  Continue to assess pending progress    Patient Education:     Patient Education  Education Given To: Patient  Education Provided: Plan of Care, Role of Therapy, Energy Conservation  Education Provided Comments: Importance of avoiding being in a hurry with any activity to help manage fatigue levels  Education Method: Demonstration, Teach Back  Barriers to Learning: None  Education Outcome: Verbalized understanding, Continued education needed    Equipment Recommendations:  Equipment Needed: No    Plan:  Times Per Week: 5x  Times Per Day: Once a day  Current Treatment Recommendations: Strengthening, Balance training, Functional mobility training, Endurance training, Safety education & training, Self-Care / ADL  Additional Comments: Pt would benefit from continued skilled OT services when medically stable and discharged from Acute. Specific Instructions for Next Treatment: Functional mobility; ADLs and energy conservation techniques; UE exercises with steady breathing. .  See long-term goal time frame for expected duration of plan of care. If no long-term goals established, a short length of stay is anticipated. Goals:  Patient goals : \"I want to get stronger and be able to return to being active at home. \" pt states.   Short Term Goals  Time Frame for Short Term Goals: By discharge  Short Term Goal 1: Pt will demonstrate functional mobility walking to/from the bathroom or in the kitchenette while using any AD needed with SBA and verbal cues for management of the O2 line to prepare for doing self care at home and move about safely. Short Term Goal 2: Pt will complete a spongebath or shower with SBA while following energy conservation techniques including planning and pacing to increase his activity tolerance for ease of doing his mourning routine. Short Term Goal 3: Pt will complete BUE ROM/high resistance exercises while using proper breathing technique to increase his strength and endurance for ease of doing ADLs or going in/out of his home. Short Term Goal 4: Pt will complete toileting routine including transfers to/from the standard toilet seat with supervision to increase his independence with self care. Additional Goals?: No         Following session, patient left in safe position with all fall risk precautions in place.

## 2022-10-03 NOTE — PROGRESS NOTES
[facility]   Nephrology progress note  10/3/2022, 10:00 AM      Pt Name:    Joel George  MRN:     [id number]     [place]:    1965  Admit Date:    9/28/2022  9:56 AM    Chief Complaint: Nephrology following for NURIS    Subjective:  Patient was seen and examined this morning in the inpatient dialysis center. He says he is able to breathe easier today. The chest tube is still irritating him; however, his breathing is much improved. He also states that he feels significantly less puffy today. He has no other complaints, reports no chest pain or shortness of breath. Objective:  24HR INTAKE/OUTPUT:    Intake/Output Summary (Last 24 hours) at 10/3/2022 1000  Last data filed at 10/3/2022 0302  Gross per 24 hour   Intake 440 ml   Output 90 ml   Net 350 ml         I/O last 3 completed shifts: In: 640 [P.O.:640]  Out: 100 [Chest Tube:100]  No intake/output data recorded. Admission weight: 250 lb (113.4 kg)  Wt Readings from Last 3 Encounters:   10/03/22 220 lb 7.4 oz (100 kg)   09/13/21 250 lb (113.4 kg)   07/26/21 240 lb (108.9 kg)        Vitals :   Vitals:    10/03/22 0045 10/03/22 0300 10/03/22 0640 10/03/22 0730   BP: 135/65 130/60 136/63 122/62   Pulse: 71 78 87 84   Resp:  16  14   Temp:  97.4 °F (36.3 °C)  97.6 °F (36.4 °C)   TempSrc:  Oral     SpO2:  97% 95% 97%   Weight:  232 lb 6.4 oz (105.4 kg)  220 lb 7.4 oz (100 kg)   Height:           Physical examination  General Appearance: Older, -American male, laying on bed, attached to dialysis machine. He is alert and cooperative with exam, appears older than stated age, comfortable, and in no distress. Mouth/Throat: Oral mucosa moist  Neck: No JVD  Lungs: Air entry B/L, no rales, no use of accessory muscles.   Heart:  S1, S2 heard,   GI: soft, non-tender, no guarding, normal bowel sounds  Extremities: No LE edema    Medications:  Infusion:    sodium chloride       Meds:    sodium zirconium cyclosilicate  10 g Oral Once aspirin  81 mg Oral Daily    atorvastatin  40 mg Oral Nightly    cloNIDine  0.3 mg Oral TID    doxazosin  8 mg Oral Nightly    famotidine  20 mg Oral Daily    ferrous sulfate  325 mg Oral Daily with breakfast    folic acid  1,760 mcg Oral Daily    fluticasone  1 spray Nasal Daily    isosorbide mononitrate  120 mg Oral Daily    sertraline  100 mg Oral Daily    verapamil  240 mg Oral Daily    multivitamin  1 tablet Oral Daily    sodium chloride flush  10 mL IntraVENous 2 times per day    heparin (porcine)  5,000 Units SubCUTAneous 3 times per day     Meds prn: hydrOXYzine HCl, traZODone, sodium chloride flush, sodium chloride, ondansetron **OR** ondansetron, polyethylene glycol, acetaminophen, HYDROcodone 5 mg - acetaminophen **OR** HYDROcodone 5 mg - acetaminophen, morphine     Lab Data :  CBC:   Recent Labs     10/01/22  0406 10/02/22  0340 10/03/22  0443   WBC 5.0 4.5* 3.7*   HGB 10.7* 10.5* 10.2*   HCT 36.6* 35.0* 33.6*   * 150 92*     CMP:  Recent Labs     10/01/22  0406 10/02/22  0340 10/02/22  0836 10/02/22  1440 10/03/22  0443    135  --   --  133*   K 5.1 5.6* 5.2 5.6* 5.9*    98  --   --  97*   CO2 24 22*  --   --  23   BUN 30* 52*  --   --  73*   CREATININE 5.7* 8.2*  --   --  10.3*   GLUCOSE 95 91  --   --  105   CALCIUM 8.9 9.0  --   --  8.9   MG 2.3  --   --   --   --    PHOS 5.1*  --   --   --   --      Hepatic: No results for input(s): LABALBU, AST, ALT, ALB, BILITOT, ALKPHOS in the last 72 hours. Assessment and Plan:    1. ESRD on HD TTS  -will dialyze on MWF while inpatient d/t staffing   -he did have extra treatment on admission for pulmonary edema  -HD tomorrow  2. Hyperkalemia 5.9 this morning. Holding Kenn Olszewski pending results of repeat K post dialysis. Continue Low K diet  3. Hyperphosphatemia, on Velphoro  4. Acute hypoxic/hypercapneic resp failure-resolved: s/p chest tube placement  5. Pulmonary edema-improved with dialysis  6. Right pleural effusion s/p chest tube  7. Anemia in CKD, hgb at goal  8. DM  9. Secondary hyperparathyroidism     D/W patient     D/W patient and RN    Re Aaron DO  Supervised by   Maribel Tena DO  [facility]    This report has been created using voice recognition software.  It may contain minor errors which are inherent in voice recognition technology

## 2022-10-03 NOTE — PROGRESS NOTES
[facility] 62  PHYSICAL THERAPY MISSED TREATMENT NOTE  [facility] ICU STEPDOWN TELEMETRY 4K    Date: 10/3/2022  Patient Name: Wilmer Gregory        MRN: [id number]   : 1965  (56 y.o.)  Gender: male                REASON FOR MISSED TREATMENT:  Missed Treat. Pt at dialysis.

## 2022-10-04 ENCOUNTER — APPOINTMENT (OUTPATIENT)
Dept: GENERAL RADIOLOGY | Age: 57
DRG: 163 | End: 2022-10-04
Payer: MEDICARE

## 2022-10-04 LAB
ANION GAP SERPL CALCULATED.3IONS-SCNC: 12 MEQ/L (ref 8–16)
BUN BLDV-MCNC: 38 MG/DL (ref 7–22)
CALCIUM SERPL-MCNC: 9 MG/DL (ref 8.5–10.5)
CHLORIDE BLD-SCNC: 100 MEQ/L (ref 98–111)
CO2: 24 MEQ/L (ref 23–33)
CREAT SERPL-MCNC: 7.7 MG/DL (ref 0.4–1.2)
ERYTHROCYTE [DISTWIDTH] IN BLOOD BY AUTOMATED COUNT: 12.7 % (ref 11.5–14.5)
ERYTHROCYTE [DISTWIDTH] IN BLOOD BY AUTOMATED COUNT: 47.6 FL (ref 35–45)
GFR SERPL CREATININE-BSD FRML MDRD: 9 ML/MIN/1.73M2
GLUCOSE BLD-MCNC: 115 MG/DL (ref 70–108)
GLUCOSE BLD-MCNC: 123 MG/DL (ref 70–108)
GLUCOSE BLD-MCNC: 71 MG/DL (ref 70–108)
GLUCOSE BLD-MCNC: 86 MG/DL (ref 70–108)
HCT VFR BLD CALC: 32.4 % (ref 42–52)
HEMOGLOBIN: 9.7 GM/DL (ref 14–18)
MCH RBC QN AUTO: 31.1 PG (ref 26–33)
MCHC RBC AUTO-ENTMCNC: 29.9 GM/DL (ref 32.2–35.5)
MCV RBC AUTO: 103.8 FL (ref 80–94)
PHOSPHORUS: 5.4 MG/DL (ref 2.4–4.7)
PLATELET # BLD: 97 THOU/MM3 (ref 130–400)
PMV BLD AUTO: 10.2 FL (ref 9.4–12.4)
POTASSIUM SERPL-SCNC: 5 MEQ/L (ref 3.5–5.2)
RBC # BLD: 3.12 MILL/MM3 (ref 4.7–6.1)
SODIUM BLD-SCNC: 136 MEQ/L (ref 135–145)
WBC # BLD: 3.2 THOU/MM3 (ref 4.8–10.8)

## 2022-10-04 PROCEDURE — 2580000003 HC RX 258: Performed by: NURSE PRACTITIONER

## 2022-10-04 PROCEDURE — 99233 SBSQ HOSP IP/OBS HIGH 50: CPT | Performed by: INTERNAL MEDICINE

## 2022-10-04 PROCEDURE — 6370000000 HC RX 637 (ALT 250 FOR IP): Performed by: INTERNAL MEDICINE

## 2022-10-04 PROCEDURE — 2580000003 HC RX 258: Performed by: PHYSICIAN ASSISTANT

## 2022-10-04 PROCEDURE — 97162 PT EVAL MOD COMPLEX 30 MIN: CPT

## 2022-10-04 PROCEDURE — 6370000000 HC RX 637 (ALT 250 FOR IP): Performed by: PHYSICIAN ASSISTANT

## 2022-10-04 PROCEDURE — 36415 COLL VENOUS BLD VENIPUNCTURE: CPT

## 2022-10-04 PROCEDURE — 99232 SBSQ HOSP IP/OBS MODERATE 35: CPT | Performed by: INTERNAL MEDICINE

## 2022-10-04 PROCEDURE — 6370000000 HC RX 637 (ALT 250 FOR IP): Performed by: NURSE PRACTITIONER

## 2022-10-04 PROCEDURE — 82948 REAGENT STRIP/BLOOD GLUCOSE: CPT

## 2022-10-04 PROCEDURE — 2060000000 HC ICU INTERMEDIATE R&B

## 2022-10-04 PROCEDURE — 97530 THERAPEUTIC ACTIVITIES: CPT

## 2022-10-04 PROCEDURE — 6360000002 HC RX W HCPCS: Performed by: INTERNAL MEDICINE

## 2022-10-04 PROCEDURE — 6360000002 HC RX W HCPCS: Performed by: PHYSICIAN ASSISTANT

## 2022-10-04 PROCEDURE — 6360000002 HC RX W HCPCS: Performed by: NURSE PRACTITIONER

## 2022-10-04 PROCEDURE — 97535 SELF CARE MNGMENT TRAINING: CPT

## 2022-10-04 PROCEDURE — 94761 N-INVAS EAR/PLS OXIMETRY MLT: CPT

## 2022-10-04 PROCEDURE — 85027 COMPLETE CBC AUTOMATED: CPT

## 2022-10-04 PROCEDURE — 84100 ASSAY OF PHOSPHORUS: CPT

## 2022-10-04 PROCEDURE — 6370000000 HC RX 637 (ALT 250 FOR IP)

## 2022-10-04 PROCEDURE — 6370000000 HC RX 637 (ALT 250 FOR IP): Performed by: STUDENT IN AN ORGANIZED HEALTH CARE EDUCATION/TRAINING PROGRAM

## 2022-10-04 PROCEDURE — 2700000000 HC OXYGEN THERAPY PER DAY

## 2022-10-04 PROCEDURE — 32561 LYSE CHEST FIBRIN INIT DAY: CPT | Performed by: INTERNAL MEDICINE

## 2022-10-04 PROCEDURE — 71045 X-RAY EXAM CHEST 1 VIEW: CPT

## 2022-10-04 PROCEDURE — 80048 BASIC METABOLIC PNL TOTAL CA: CPT

## 2022-10-04 RX ORDER — DOCUSATE SODIUM 100 MG/1
100 CAPSULE, LIQUID FILLED ORAL 2 TIMES DAILY
Status: DISCONTINUED | OUTPATIENT
Start: 2022-10-04 | End: 2022-10-15 | Stop reason: HOSPADM

## 2022-10-04 RX ORDER — DOXYCYCLINE HYCLATE 100 MG
100 TABLET ORAL EVERY 12 HOURS SCHEDULED
Status: DISCONTINUED | OUTPATIENT
Start: 2022-10-04 | End: 2022-10-04

## 2022-10-04 RX ORDER — DEXAMETHASONE 0.5 MG/1
0.5 TABLET ORAL DAILY
Status: DISCONTINUED | OUTPATIENT
Start: 2022-10-04 | End: 2022-10-15 | Stop reason: HOSPADM

## 2022-10-04 RX ORDER — SENNA PLUS 8.6 MG/1
1 TABLET ORAL NIGHTLY
Status: DISCONTINUED | OUTPATIENT
Start: 2022-10-04 | End: 2022-10-15 | Stop reason: HOSPADM

## 2022-10-04 RX ORDER — DOXYCYCLINE HYCLATE 100 MG
100 TABLET ORAL EVERY 12 HOURS SCHEDULED
Status: DISCONTINUED | OUTPATIENT
Start: 2022-10-04 | End: 2022-10-12

## 2022-10-04 RX ADMIN — HEPARIN SODIUM 5000 UNITS: 5000 INJECTION INTRAVENOUS; SUBCUTANEOUS at 13:12

## 2022-10-04 RX ADMIN — Medication 1 TABLET: at 10:35

## 2022-10-04 RX ADMIN — ISOSORBIDE MONONITRATE 120 MG: 60 TABLET, EXTENDED RELEASE ORAL at 10:36

## 2022-10-04 RX ADMIN — VERAPAMIL HYDROCHLORIDE 240 MG: 240 TABLET, FILM COATED, EXTENDED RELEASE ORAL at 10:36

## 2022-10-04 RX ADMIN — FERROUS SULFATE TAB 325 MG (65 MG ELEMENTAL FE) 325 MG: 325 (65 FE) TAB at 10:36

## 2022-10-04 RX ADMIN — DOXAZOSIN 8 MG: 4 TABLET ORAL at 20:33

## 2022-10-04 RX ADMIN — FLUTICASONE PROPIONATE 1 SPRAY: 50 SPRAY, METERED NASAL at 10:34

## 2022-10-04 RX ADMIN — HYDROCODONE BITARTRATE AND ACETAMINOPHEN 2 TABLET: 5; 325 TABLET ORAL at 20:33

## 2022-10-04 RX ADMIN — HEPARIN SODIUM 5000 UNITS: 5000 INJECTION INTRAVENOUS; SUBCUTANEOUS at 21:36

## 2022-10-04 RX ADMIN — CLONIDINE HYDROCHLORIDE 0.3 MG: 0.2 TABLET ORAL at 13:12

## 2022-10-04 RX ADMIN — CLONIDINE HYDROCHLORIDE 0.3 MG: 0.2 TABLET ORAL at 10:36

## 2022-10-04 RX ADMIN — DEXAMETHASONE 0.5 MG: 0.5 TABLET ORAL at 13:12

## 2022-10-04 RX ADMIN — HEPARIN SODIUM 5000 UNITS: 5000 INJECTION INTRAVENOUS; SUBCUTANEOUS at 05:47

## 2022-10-04 RX ADMIN — DOXYCYCLINE HYCLATE 100 MG: 100 TABLET, COATED ORAL at 20:32

## 2022-10-04 RX ADMIN — DOXYCYCLINE HYCLATE 100 MG: 100 TABLET, COATED ORAL at 10:35

## 2022-10-04 RX ADMIN — SENNOSIDES 8.6 MG: 8.6 TABLET, COATED ORAL at 21:35

## 2022-10-04 RX ADMIN — ATORVASTATIN CALCIUM 40 MG: 40 TABLET, FILM COATED ORAL at 20:33

## 2022-10-04 RX ADMIN — ALTEPLASE 20 MG: 2.2 INJECTION, POWDER, LYOPHILIZED, FOR SOLUTION INTRAVENOUS at 09:30

## 2022-10-04 RX ADMIN — ASPIRIN 81 MG: 81 TABLET, COATED ORAL at 10:35

## 2022-10-04 RX ADMIN — SODIUM CHLORIDE, PRESERVATIVE FREE 10 ML: 5 INJECTION INTRAVENOUS at 10:33

## 2022-10-04 RX ADMIN — CLONIDINE HYDROCHLORIDE 0.3 MG: 0.2 TABLET ORAL at 20:33

## 2022-10-04 RX ADMIN — SODIUM CHLORIDE, PRESERVATIVE FREE 10 ML: 5 INJECTION INTRAVENOUS at 20:33

## 2022-10-04 RX ADMIN — HYDROCODONE BITARTRATE AND ACETAMINOPHEN 1 TABLET: 5; 325 TABLET ORAL at 08:38

## 2022-10-04 RX ADMIN — POLYETHYLENE GLYCOL 3350 17 G: 17 POWDER, FOR SOLUTION ORAL at 05:51

## 2022-10-04 RX ADMIN — FAMOTIDINE 20 MG: 20 TABLET ORAL at 10:36

## 2022-10-04 RX ADMIN — SERTRALINE 100 MG: 100 TABLET, FILM COATED ORAL at 10:36

## 2022-10-04 RX ADMIN — DOCUSATE SODIUM 100 MG: 100 CAPSULE, LIQUID FILLED ORAL at 21:36

## 2022-10-04 RX ADMIN — FOLIC ACID 1000 MCG: 1 TABLET ORAL at 10:36

## 2022-10-04 RX ADMIN — DORNASE ALFA 5 MG: 1 SOLUTION RESPIRATORY (INHALATION) at 09:30

## 2022-10-04 ASSESSMENT — PAIN DESCRIPTION - DESCRIPTORS
DESCRIPTORS: ACHING
DESCRIPTORS: ACHING

## 2022-10-04 ASSESSMENT — PAIN SCALES - GENERAL
PAINLEVEL_OUTOF10: 6
PAINLEVEL_OUTOF10: 3
PAINLEVEL_OUTOF10: 6
PAINLEVEL_OUTOF10: 8

## 2022-10-04 ASSESSMENT — PAIN - FUNCTIONAL ASSESSMENT
PAIN_FUNCTIONAL_ASSESSMENT: ACTIVITIES ARE NOT PREVENTED
PAIN_FUNCTIONAL_ASSESSMENT: ACTIVITIES ARE NOT PREVENTED

## 2022-10-04 ASSESSMENT — PAIN DESCRIPTION - ORIENTATION
ORIENTATION: RIGHT
ORIENTATION: RIGHT

## 2022-10-04 ASSESSMENT — PAIN DESCRIPTION - FREQUENCY
FREQUENCY: INTERMITTENT
FREQUENCY: INTERMITTENT

## 2022-10-04 ASSESSMENT — PAIN DESCRIPTION - PAIN TYPE
TYPE: SURGICAL PAIN
TYPE: SURGICAL PAIN

## 2022-10-04 ASSESSMENT — PAIN DESCRIPTION - ONSET
ONSET: ON-GOING
ONSET: ON-GOING

## 2022-10-04 ASSESSMENT — PAIN DESCRIPTION - LOCATION
LOCATION: RIB CAGE
LOCATION: RIB CAGE

## 2022-10-04 NOTE — PROGRESS NOTES
POLPROGRESS NOTE      Patient:  Korin Reed    Unit/Bed:4K-14/014-A  YOB: 1965  MRN: [id number]   PCP: Jen Medina MD  Date of Admission: 9/28/2022  Chief Complaint:-Acute Hypoxic respiratory failure     Assessment and Plan:       Acute hypoxic respiratory failure: Weaned to 3 L nasal cannula. Improved with chest tube placement. Chest tube to remain in place. Placed back to suction 10/3. Patient underwent chemical pneumolysis with tPA and dornase injection 10/4  Right pleural effusion: chest tube placed 9/28. Place back to suction. Chemical pneumolysis with tPA and dornase injection completed on 10/4. Right entrapped lung: Plans for chemical pneumolysis 10/4. Maintain chest tube in place. Cytology negative for malignant cells x 2, awaiting additional cytology. Added 30 days of doxycycline 10/3  Right sided pulmonary consolidations: Associated with exudative pleural effusion. Doxycycline added. Will treat for 30 days. May benefit from low-dose Decadron to help attenuate consolidated process. History of thoracic and aortic aneurysm: status post stent graft 7/2018. Diabetes mellitus: Diet controlled  Hyperparathyroidism: Secondary to renal failure  Depression: Zoloft  GERD: Pepcid  History of substance abuse: History of cocaine use  Tobacco abuse:  every day smoker, recommend smoking cessation  Hyperlipidemia: Lipitor  Hypertension: Imdur, Cardura, Catapres. INITIAL H AND P AND ICU COURSE:  Marito Hensley is a 68-year-old black male who presented to [facility] on 9/28/2022 with worsening shortness of breath. He has a past medical history of current every day smoker, AAA, stage renal disease, cocaine abuse, depression, diabetes mellitus, hyperlipidemia, hypertension, medical noncompliance, PTSD. Per report patient presented to the emergency department on 9/28 with worsening shortness of breath. Chest x-ray showed large right pleural effusion.   Patient was placed on BiPAP. He was admitted to stepdown where he underwent a chest tube placement on 9/28 with 2 L of serosanguineous fluid. Dialysis was also performed on 9/28. He underwent albumin administration on 9/29. Repeat dialysis on 9/30. Chest tube drainage was noted for exudative effusion. 10/4 chest tube instilled with dornase and tPA. X-ray for tomorrow. Past Medical History: per HPI  Family History: Mother: Cancer  Social History: Current every day smoker, denies alcohol and drug use. Review of Systems   Constitutional:  Negative for fatigue and fever. HENT:  Negative for sneezing and trouble swallowing. Eyes:  Negative for photophobia and visual disturbance. Respiratory:  Negative for chest tightness, shortness of breath and wheezing. Cardiovascular:  Negative for chest pain and leg swelling. Gastrointestinal:  Negative for abdominal pain, nausea and vomiting. Endocrine: Negative for polydipsia. Genitourinary:  Positive for decreased urine volume. Negative for flank pain. Musculoskeletal:  Negative for back pain and neck pain. Skin:  Negative for color change, pallor and rash. Allergic/Immunologic: Negative for food allergies. Neurological:  Negative for dizziness, weakness and numbness. Hematological:  Negative for adenopathy. Psychiatric/Behavioral:  Negative for agitation and confusion. The patient is nervous/anxious.     Scheduled Meds:   aspirin  81 mg Oral Daily    atorvastatin  40 mg Oral Nightly    cloNIDine  0.3 mg Oral TID    doxazosin  8 mg Oral Nightly    famotidine  20 mg Oral Daily    ferrous sulfate  325 mg Oral Daily with breakfast    folic acid  0,759 mcg Oral Daily    fluticasone  1 spray Nasal Daily    isosorbide mononitrate  120 mg Oral Daily    sertraline  100 mg Oral Daily    verapamil  240 mg Oral Daily    multivitamin  1 tablet Oral Daily    sodium chloride flush  10 mL IntraVENous 2 times per day    heparin (porcine)  5,000 Units SubCUTAneous 3 times per day     Continuous Infusions:   sodium chloride         PHYSICAL EXAMINATION:  T:  98.6. P:  95. RR:  18. B/P:  169/91. FiO2:  1. O2 Sat:  96.  I/O:  450/2610  Body mass index is 26.56 kg/m². GCS:  15  General:   Acute on chronically  ill-appearing black male  HEENT:  normocephalic and atraumatic. No scleral icterus. PERR  Neck: supple. No Thyromegaly. Lungs: clear to auscultation. No retractions  Cardiac: RRR. No JVD. Abdomen: soft. Nontender. Extremities:  No clubbing, cyanosis, or edema x 4. Vasculature: capillary refill < 3 seconds. Palpable dorsalis pedis pulses. Skin:  warm and dry. Psych:  Alert and oriented x3. Affect appropriate  Lymph:  No supraclavicular adenopathy. Neurologic:  No focal deficit. No seizures. Data: (All radiographs, tracings, PFTs, and imaging are personally viewed and interpreted unless otherwise noted). Sodium 136, potassium 5.0, chloride 100, CO2 24, BUN 38, creatinine 7.7, anion gap 12.0, glucose 86. WBC 3.2, hemoglobin 9.7, hematocrit 32.4, platelet count 97  Telemetry shows NSR   Chest x-ray 10/4/2022 reports persistent pleural parenchymal changes in the right mid to right lower lung fields. Chest tube present. No pneumothorax. Case and plan discussed with Dr. Jennifer Castro. Electronically signed by WADE Breen CNP  CRITICAL CARE SPECIALIST   Patient seen by me including key components of medical care. Case discussed with Dr. Luz Mac. Patient undergoing comical pneumo lysis. This was placed at 9:30 AM today. Unclamping of chest tube will occur at 1130 this morning. Patient without fever or chills. Breathing is easier. Still has intermittent pleuritic chest pain on the right from the chest tube. .  Italicized font, if present,  represents changes to the note made by me. CC time 35 minutes. Time was discontiguous. Time does not include procedure. Time does include my direct assessment of the patient and coordination of care.   Time represents more than 50% of the time involved with patient care by the [facility] team.  Electronically signed by Jalyn Wade MD.

## 2022-10-04 NOTE — PROGRESS NOTES
[facility] ICU STEPDOWN TELEMETRY 4K  Occupational Therapy  Daily Note  Time:   Time In: 5618  Time Out: 1100  Timed Code Treatment Minutes: 24 Minutes  Minutes: 24          Date: 10/4/2022  Patient Name: Percy Thacker,   Gender: male      Room: -14/014-A  MRN: [id number]  : 1965  (56 y.o.)  Referring Practitioner: Dr. Lian Gallardo MD  Diagnosis: Hypoxia  Additional Pertinent Hx: Pt presented to [address] [place] [address] on 2022 with worsening shortness of breath at rest and with exertion. CXR showed large right pleural effusion and patient required PAP therapy for adequate oxygenation. Patient was admitted and chest tube placed on  with 2 L of serosanguineous fluid drainage. Dialysis also performed at  with 2 L of fluid. Repeat dialysis performed on  with albumin administration. Repeat dialysis performed on 2022. Pleural fluid analysis was consistent with exudative effusion with testing for malignancy pending. Suction for chest tube was discontinued on 10/01. Restrictions/Precautions:  Restrictions/Precautions: Fall Risk     SUBJECTIVE: pt laying in bed upon arrival, pt agreeable to OT session, RN gave verbal approval for session     PAIN: 0/10:     Vitals: Oxygen: 90-92% on 2L during funcitonal mob    COGNITION: Slow Processing and Decreased Insight    ADL:   Lower Extremity Dressing: Stand By Assistance. With pt donning shorts sitting at the EOB and then standing . BALANCE:  Standing Balance: Stand By Assistance. With RW present, and BUE release from the walker, in preparation for functional mob and donning pants    BED MOBILITY:  Supine to Sit: Supervision with the [facility] elevated    TRANSFERS:  Sit to Stand:  Stand By Assistance. From the EOB  Stand to Sit: Stand By Assistance. Back to the EOB    FUNCTIONAL MOBILITY:  Assistive Device: Rolling Walker  Assist Level:  Stand By Assistance.    Distance:  [facility] distances, with pt noted to have slight decrease with o2 with vc's for PLB and deep breathing     ASSESSMENT:     Activity Tolerance:  Patient tolerance of  treatment: good. Discharge Recommendations: Home with Home Health OT  Equipment Recommendations: Equipment Needed: No  Plan: Times Per Week: 5x  Times Per Day: Once a day  Specific Instructions for Next Treatment: Functional mobility; ADLs and energy conservation techniques; UE exercises with steady breathing. Current Treatment Recommendations: Strengthening, Balance training, Functional mobility training, Endurance training, Safety education & training, Self-Care / ADL  Additional Comments: Pt would benefit from continued skilled OT services when medically stable and discharged from Acute. Patient Education  Patient Education: ADL's    Goals  Short Term Goals  Time Frame for Short Term Goals: By discharge  Short Term Goal 1: Pt will demonstrate functional mobility walking to/from the bathroom or in the kitchenette while using any AD needed with SBA and verbal cues for management of the O2 line to prepare for doing self care at home and move about safely. Short Term Goal 2: Pt will complete a spongebath or shower with SBA while following energy conservation techniques including planning and pacing to increase his activity tolerance for ease of doing his mourning routine. Short Term Goal 3: Pt will complete BUE ROM/high resistance exercises while using proper breathing technique to increase his strength and endurance for ease of doing ADLs or going in/out of his home. Short Term Goal 4: Pt will complete toileting routine including transfers to/from the standard toilet seat with supervision to increase his independence with self care. Additional Goals?: No    Following session, patient left in safe position with all fall risk precautions in place.

## 2022-10-04 NOTE — PLAN OF CARE
Problem: Discharge Planning  Goal: Discharge to home or other facility with appropriate resources  10/3/2022 2338 by Washington Hardy RN  Outcome: Progressing  Flowsheets  Taken 10/3/2022 2338 by Washington Hardy RN  Discharge to home or other facility with appropriate resources:   Identify barriers to discharge with patient and caregiver   Identify discharge learning needs (meds, wound care, etc)  Taken 10/3/2022 1554 by Keaton Perez RN  Discharge to home or other facility with appropriate resources: Identify barriers to discharge with patient and caregiver     Problem: Pain  Goal: Verbalizes/displays adequate comfort level or baseline comfort level  10/3/2022 2338 by Washington Hardy RN  Outcome: Progressing  Flowsheets (Taken 10/3/2022 2338)  Verbalizes/displays adequate comfort level or baseline comfort level:   Encourage patient to monitor pain and request assistance   Assess pain using appropriate pain scale   Administer analgesics based on type and severity of pain and evaluate response   Implement non-pharmacological measures as appropriate and evaluate response     Problem: Safety - Adult  Goal: Free from fall injury  10/3/2022 2338 by Washington Hardy RN  Outcome: Progressing  Flowsheets (Taken 10/3/2022 2338)  Free From Fall Injury: Instruct family/caregiver on patient safety  Note: Patient free from falls this shift. Problem: ABCDS Injury Assessment  Goal: Absence of physical injury  10/3/2022 2338 by Washington Hardy RN  Outcome: Progressing  Flowsheets (Taken 10/3/2022 2338)  Absence of Physical Injury: Implement safety measures based on patient assessment  Note: Bed alarm in use this shift.      Problem: Respiratory - Adult  Goal: Achieves optimal ventilation and oxygenation  10/3/2022 2338 by Washington Hardy RN  Outcome: Progressing  Flowsheets (Taken 10/3/2022 2338)  Achieves optimal ventilation and oxygenation:   Assess for changes in respiratory status   Assess for changes in mentation and behavior   Position to facilitate oxygenation and minimize respiratory effort   Oxygen supplementation based on oxygen saturation or arterial blood gases   Encourage broncho-pulmonary hygiene including cough, deep breathe, incentive spirometry   Assess and instruct to report shortness of breath or any respiratory difficulty     Problem: Cardiovascular - Adult  Goal: Maintains optimal cardiac output and hemodynamic stability  10/3/2022 2338 by Gloria Casillas RN  Outcome: Progressing  Flowsheets  Taken 10/3/2022 2338 by Gloria Casillas RN  Maintains optimal cardiac output and hemodynamic stability:   Monitor blood pressure and heart rate   Monitor urine output and notify Licensed Independent Practitioner for values outside of normal range   Assess for signs of decreased cardiac output  Taken 10/3/2022 1554 by Jordan Lowry RN  Maintains optimal cardiac output and hemodynamic stability:   Monitor blood pressure and heart rate   Monitor urine output and notify Licensed Independent Practitioner for values outside of normal range   Assess for signs of decreased cardiac output     Problem: Skin/Tissue Integrity - Adult  Goal: Skin integrity remains intact  10/3/2022 2338 by Gloria Casillas RN  Outcome: Progressing  Flowsheets  Taken 10/3/2022 2338 by Gloria Casillas RN  Skin Integrity Remains Intact:   Assess vascular access sites hourly   Monitor for areas of redness and/or skin breakdown  Taken 10/3/2022 1554 by Jordan Lowry RN  Skin Integrity Remains Intact:   Monitor for areas of redness and/or skin breakdown   Assess vascular access sites hourly   Every 4-6 hours: If on nasal continuous positive airway pressure, respiratory therapy assesses nares and determine need for appliance change or resting period     Problem: Infection - Adult  Goal: Absence of infection during hospitalization  10/3/2022 2338 by Gloria Casillas RN  Outcome: Progressing  Flowsheets  Taken 10/3/2022 2338 by Ramon Bai RN  Absence of infection during hospitalization:   Assess and monitor for signs and symptoms of infection   Monitor lab/diagnostic results   Monitor all insertion sites i.e., indwelling lines, tubes and drains   Instruct and encourage patient and family to use good hand hygiene technique  Taken 10/3/2022 1554 by Omar Quintero RN  Absence of infection during hospitalization:   Assess and monitor for signs and symptoms of infection   Monitor lab/diagnostic results     Problem: Metabolic/Fluid and Electrolytes - Adult  Goal: Electrolytes maintained within normal limits  10/3/2022 2338 by Ramon Bai RN  Outcome: Progressing  Flowsheets  Taken 10/3/2022 2338 by Ramon Bai RN  Electrolytes maintained within normal limits: Monitor labs and assess patient for signs and symptoms of electrolyte imbalances  Taken 10/3/2022 1554 by Omar Quintero RN  Electrolytes maintained within normal limits:   Monitor labs and assess patient for signs and symptoms of electrolyte imbalances   Administer electrolyte replacement as ordered     Problem: Skin/Tissue Integrity  Goal: Absence of new skin breakdown  Description: 1. Monitor for areas of redness and/or skin breakdown  2. Assess vascular access sites hourly  3. Every 4-6 hours minimum:  Change oxygen saturation probe site  4. Every 4-6 hours:  If on nasal continuous positive airway pressure, respiratory therapy assess nares and determine need for appliance change or resting period. 10/3/2022 2338 by Ramon Bai RN  Outcome: Progressing  Note: No new skin breakdown this shift.      Problem: Chronic Conditions and Co-morbidities  Goal: Patient's chronic conditions and co-morbidity symptoms are monitored and maintained or improved  10/3/2022 2338 by Ramon Bai RN  Outcome: Progressing  Flowsheets  Taken 10/3/2022 2338 by Noreen Horowitz 34 - Patient's Chronic Conditions and Co-Morbidity Symptoms are Monitored and Maintained or Improved:   Monitor and assess patient's chronic conditions and comorbid symptoms for stability, deterioration, or improvement   Collaborate with multidisciplinary team to address chronic and comorbid conditions and prevent exacerbation or deterioration   Update acute care plan with appropriate goals if chronic or comorbid symptoms are exacerbated and prevent overall improvement and discharge  Taken 10/3/2022 1554 by Noreen Lindsay 34 - Patient's Chronic Conditions and Co-Morbidity Symptoms are Monitored and Maintained or Improved:   Monitor and assess patient's chronic conditions and comorbid symptoms for stability, deterioration, or improvement   Collaborate with multidisciplinary team to address chronic and comorbid conditions and prevent exacerbation or deterioration   Update acute care plan with appropriate goals if chronic or comorbid symptoms are exacerbated and prevent overall improvement and discharge     Problem: Nutrition Deficit:  Goal: Optimize nutritional status  10/3/2022 2338 by Alexandr Bronson RN  Outcome: Progressing  Flowsheets (Taken 10/3/2022 2338)  Nutrient intake appropriate for improving, restoring, or maintaining nutritional needs:   Assess nutritional status and recommend course of action   Monitor oral intake, labs, and treatment plans   Care plan reviewed with patient and family. Patient and family verbalize understanding of the plan of care and contribute to goal setting.

## 2022-10-04 NOTE — CARE COORDINATION
Collaborative Discharge Planning    Cadence Echeverria  :  1965  MRN:  [id number]    ADMIT DATE:  2022      Discharge Planning Discharge Planning  Type of Residence: House  Living Arrangements: Spouse/Significant Other  Support Systems: Spouse/Significant Other  Current Services Prior To Admission: None  Potential Assistance Needed: N/A  DME Ordered?: No  Potential Assistance Purchasing Medications: Yes  Type of Home Care Services: None  Patient expects to be discharged to[de-identified] House  Follow Up Appointment: Best Day/Time : Monday AM  One/Two Story Residence: [facility]  # of Interior Steps: 12  Height of Each Step (in): 6.5 Quosis  Interior Rails: Right  Lift Chair Available: No  History of falls?: No  White Board Notes /Social Work Whiteboard Notes  /Social Work Whiteboard: 10/3; CM; plans home w spouse Lon Narayan, has HD T-R-S w AVF; therapy following    Discharge Plan Home with home health  plans home w spouse Lon Narayan independently as PTA when medically cleared, current w [facility] -T- [phone] chair time; B/W Cab transports to HD; has AVF; therapy recommends HH (nsg, therapy); SW following; monitor oxygen needs; prefers [facility] after choices offered  offered provider list with abbreviated version of the quality measures, geographic area, and applicable managed care information provided. Offered option of searching Medicare. gov website by using the computer in patient's room to review complete quality measures information.  Questions regarding selection process answered:family      Discharge Milestones and Delays: Clinical status    ESRD/Loculated Right Pleural Effusion/Pneumothorax/Right Entrapped Lung      Right Chest Tube  10/4 TPA #1    Right Chest Tube Output = 210 ml/24h     Creatinine 7.7, PLT 97     Oxygen 1L    SIGNED:  Candy Garcia RN   10/4/2022, 1:05 PM

## 2022-10-04 NOTE — CARE COORDINATION
DISCHARGE/PLANNING EVALUATION  10/4/22, 2:51 PM EDT    Reason for Referral: Therapy recommends [facility]  Mental Status: Answered questions appropriately  Decision Making: Independent  Family/Social/Home Environment: Lives at home with his wife, Annika Francois including food security, transportation and housekeeping: Patient reports having no current services, he is fairly independent in his care and does still drive. Current Equipment: None- however PT has put in an order for a walker. Payment Source: [facility] Medicare and Medicaid   Concerns or Barriers to Discharge: None  Post-acute provider list with abbreviated version of the quality measures, geographic area, and applicable managed care information provided. Offered option of searching Medicare. gov website by using the computer in patient's room to review complete quality measures information. Questions regarding selection process answered: Patient declined a list, requested [facility] for RN services. Teach Back Method used with Ruddy Duverney regarding care plan and discharge planning  Patient verbalize understanding of the plan of care and contribute to goal setting. Patient goals, treatment preferences and discharge plan: Return to home with wife and new [facility] for RN services. SW left a message with referral to [facility].      Electronically signed by NITISH Babcock on 10/4/2022 at 2:51 PM

## 2022-10-04 NOTE — FLOWSHEET NOTE
10/04/22 1430   Safe Environment   Safety Measures Other (comment)  (virtual nurse safety round complete)   Pt interactive with audio call , stated everything was good , denied any voiced concerns or complaints

## 2022-10-04 NOTE — PROGRESS NOTES
syringe  5 mg IntraPLEUral Once    aspirin  81 mg Oral Daily    atorvastatin  40 mg Oral Nightly    cloNIDine  0.3 mg Oral TID    doxazosin  8 mg Oral Nightly    famotidine  20 mg Oral Daily    ferrous sulfate  325 mg Oral Daily with breakfast    folic acid  9,170 mcg Oral Daily    fluticasone  1 spray Nasal Daily    isosorbide mononitrate  120 mg Oral Daily    sertraline  100 mg Oral Daily    verapamil  240 mg Oral Daily    multivitamin  1 tablet Oral Daily    sodium chloride flush  10 mL IntraVENous 2 times per day    heparin (porcine)  5,000 Units SubCUTAneous 3 times per day     Meds prn: hydrOXYzine HCl, traZODone, sodium chloride flush, sodium chloride, ondansetron **OR** ondansetron, polyethylene glycol, acetaminophen, HYDROcodone 5 mg - acetaminophen **OR** HYDROcodone 5 mg - acetaminophen, morphine     Lab Data :  CBC:   Recent Labs     10/02/22  0340 10/03/22  0443 10/04/22  0336   WBC 4.5* 3.7* 3.2*   HGB 10.5* 10.2* 9.7*   HCT 35.0* 33.6* 32.4*    92* 97*     CMP:  Recent Labs     10/02/22  0340 10/02/22  0836 10/03/22  0443 10/03/22  1321 10/04/22  0336     --  133*  --  136   K 5.6*   < > 5.9* 4.5 5.0   CL 98  --  97*  --  100   CO2 22*  --  23  --  24   BUN 52*  --  73*  --  38*   CREATININE 8.2*  --  10.3*  --  7.7*   GLUCOSE 91  --  105  --  71   CALCIUM 9.0  --  8.9  --  9.0   PHOS  --   --   --   --  5.4*    < > = values in this interval not displayed. Hepatic: No results for input(s): LABALBU, AST, ALT, ALB, BILITOT, ALKPHOS in the last 72 hours. Assessment and Plan:    1. ESRD on HD TTS  -will dialyze on MWF while inpatient d/t staffing   -he did have extra treatment on admission for pulmonary edema  -HD tomorrow  2. Hyperkalemia 5.0 this morning. Patient's K was 4.5 following dialysis yesterday. May consider adding Lokelma on nondialysis days. Continue to monitor. Continue Low K diet  3. Hyperphosphatemia, on Velphoro. PO4 today 5.4  4.  Acute hypoxic/hypercapneic resp failure-resolved: s/p chest tube placement  5. Pulmonary edema-improved with dialysis  6. Right pleural effusion s/p chest tube. Appears to be some loculation. Patient to have pneumolysis today. 7. Anemia in CKD, hgb at goal  8. DM  9. Secondary hyperparathyroidism    D/W patient and RN    Tiki Blum DO  Supervised by  Epifanio Loya DO  [facility]    This report has been created using voice recognition software.  It may contain minor errors which are inherent in voice recognition technology

## 2022-10-04 NOTE — PLAN OF CARE
Problem: Discharge Planning  Goal: Discharge to home or other facility with appropriate resources  10/4/2022 1451 by NITISH Lux  Outcome: Progressing       Consult received. Please see SW note dated 10/04.

## 2022-10-04 NOTE — PROGRESS NOTES
[address] [facility] PHYSICAL THERAPY  EVALUATION  [facility] ICU STEPDOWN TELEMETRY 4K - 4K-14/014-A    Time In: 1128  Time Out: 1435  Timed Code Treatment Minutes: 10 Minutes  Minutes: 20          Date: 10/4/2022  Patient Name: Samuel Boland,  Gender:  male        MRN: [id number]  : 1965  (56 y.o.)      Referring Practitioner: Odell Hart MD  Diagnosis: Hypoxia  Additional Pertinent Hx: Jones Cho is a 61-year-old black male who presented to [facility] on 2022 with worsening shortness of breath. He has a past medical history of current every day smoker, AAA, stage renal disease, cocaine abuse, depression, diabetes mellitus, hyperlipidemia, hypertension, medical noncompliance, PTSD. Per report patient presented to the emergency department on  with worsening shortness of breath. Chest x-ray showed large right pleural effusion. Patient was placed on BiPAP. He was admitted to stepdown where he underwent a chest tube placement on  with 2 L of serosanguineous fluid. Dialysis was also performed on . He underwent albumin administration on . Repeat dialysis on . Chest tube drainage was noted for exudative effusion. Restrictions/Precautions:  Restrictions/Precautions: Fall Risk    Subjective:  Chart Reviewed: Yes  Patient assessed for rehabilitation services?: Yes  Family / Caregiver Present: No  Subjective: RN approved session, pt is seated in recliner, agreeable to PT. Pt on 2 L O2.     General:  Overall Orientation Status: Within Functional Limits  Vision: Within Functional Limits  Hearing: Within functional limits       Pain: denies    Vitals: Oxygen: sats remain >92% on 2 L O2    Social/Functional History:    Lives With: Spouse  Type of Home: House  Home Layout: Two level, Bed/Bath upstairs  Home Access: Stairs to enter with rails  Entrance Stairs - Number of Steps: 3+4 steps and another flight to get to floor with bedroom and bathroom  Home Equipment:  (None)     Bathroom Shower/Tub: Tub/Shower unit  Bathroom Toilet: Standard  Bathroom Accessibility: Accessible    Receives Help From: Family  ADL Assistance: Independent  Homemaking Assistance: Needs assistance  Homemaking Responsibilities: No  Ambulation Assistance: Independent  Transfer Assistance: Independent    Active : No  Occupation: Retired  Type of Occupation: Served in [employer]: Watching TV; doing yardwork  Additional Comments: Pt wakes early in the morning every day. He has an early chair time for hemodialysis. He plans to do activities in the morning. Pt likes to soak in a tub but he also stands to rinse off using the shower. OBJECTIVE:  Range of Motion:  Bilateral Lower Extremity: WFL    Strength:  Bilateral Lower Extremity: Impaired - grossly 4/5    Balance:  Static Standing Balance: Stand By Assistance  Dynamic Standing Balance: Contact Guard Assistance    Bed Mobility:  Not Tested    Transfers:  Sit to Stand: Stand By Assistance, X 1, to/from chair with arms  Stand to Sit:Stand By Assistance, X 1, to/from chair with arms    Ambulation:  Contact Guard Assistance  Distance: 250 feet  Surface: Level Tile  Device:Rolling Walker  Gait Deviations: Forward Flexed Posture, Slow Janina, Decreased Step Length Bilaterally, and Decreased Gait Speed  **Verbal cues to ambulate closer to walker, cues for posture and safety    Exercise:  Patient was guided in 1 set(s) 5-10 reps of exercise to both lower extremities. Ankle pumps, Seated marches, and Long arc quads. Exercises were completed for increased independence with functional mobility. Functional Outcome Measures: Completed  AM-PAC Inpatient Mobility without Stair Climbing Raw Score : 15  AM-PAC Inpatient without Stair Climbing T-Scale Score : 43.03    ASSESSMENT:  Activity Tolerance:  Patient tolerance of  treatment: good.        Treatment Initiated: Treatment and education initiated within context of evaluation. Evaluation time included review of current medical information, gathering information related to past medical, social and functional history, completion of standardized testing, formal and informal observation of tasks, assessment of data and development of plan of care and goals. Treatment time included skilled education and facilitation of tasks to increase safety and independence with functional mobility for improved independence and quality of life. Assessment: Body Structures, Functions, Activity Limitations Requiring Skilled Therapeutic Intervention: Decreased functional mobility , Decreased endurance, Decreased balance, Decreased strength, Decreased safe awareness  Assessment: Pt tolerates session well, limited by generalized weakness, impaired endurance. PT to continue to progress strength and functional mobility. Therapy Prognosis: Excellent    Requires PT Follow-Up: Yes    Discharge Recommendations:  Discharge Recommendations: Home with Home health PT    Patient Education:      . Patient Education  Education Given To: Patient  Education Provided: Role of Therapy, Plan of Care  Education Method: Verbal  Barriers to Learning: None  Education Outcome: Verbalized understanding       Equipment Recommendations:  Equipment Needed: Yes  Mobility Devices: Simmie Lime: Rolling    Plan:  Current Treatment Recommendations: Strengthening, Balance training, Functional mobility training, Transfer training, Endurance training, Neuromuscular re-education, Stair training, Gait training, Equipment evaluation, education, & procurement, Safety education & training, Patient/Caregiver education & training, Therapeutic activities  General Plan:  (3-5x GM)    Goals:  Patient Goals : to go home in the next couple days  Short Term Goals  Time Frame for Short Term Goals: by discharge  Short Term Goal 1: Pt to transfer supine <--> sit S to enable pt to get in/out of bed.   Short Term Goal 2: Pt to transfer sit <--> stand S for increased functional mobility. Short Term Goal 3: Pt to ambulate >250 feet with RW S for community re-entry. Short Term Goal 4: Pt to ascend/descend 12 steps with HR SBA for bed/bath accesss. Long Term Goals  Time Frame for Long Term Goals : NA due to short length of stay. Following session, patient left in safe position with all fall risk precautions in place.

## 2022-10-04 NOTE — PLAN OF CARE
Problem: Discharge Planning  Goal: Discharge to home or other facility with appropriate resources  10/4/2022 1210 by Austen Martinez RN  Outcome: Progressing  Flowsheets (Taken 10/4/2022 0800)  Discharge to home or other facility with appropriate resources:   Identify barriers to discharge with patient and caregiver   Identify discharge learning needs (meds, wound care, etc)   Arrange for needed discharge resources and transportation as appropriate   Refer to discharge planning if patient needs post-hospital services based on physician order or complex needs related to functional status, cognitive ability or social support system     Problem: Pain  Goal: Verbalizes/displays adequate comfort level or baseline comfort level  10/4/2022 1210 by Austen Martinez RN  Outcome: Progressing  Flowsheets (Taken 10/4/2022 1210)  Verbalizes/displays adequate comfort level or baseline comfort level:   Encourage patient to monitor pain and request assistance   Assess pain using appropriate pain scale   Administer analgesics based on type and severity of pain and evaluate response     Problem: Safety - Adult  Goal: Free from fall injury  10/4/2022 1210 by Austen Martinez RN  Outcome: Progressing  Flowsheets (Taken 10/4/2022 1210)  Free From Fall Injury: Instruct family/caregiver on patient safety  Note: Pt remains free from falls. Bed alarm on, call light and personal items within reach.        Problem: ABCDS Injury Assessment  Goal: Absence of physical injury  10/4/2022 1210 by Austen Martinez RN  Outcome: Progressing  Flowsheets (Taken 10/4/2022 1210)  Absence of Physical Injury: Implement safety measures based on patient assessment     Problem: Respiratory - Adult  Goal: Achieves optimal ventilation and oxygenation  10/4/2022 1210 by Austen Martinez RN  Outcome: Progressing  Flowsheets (Taken 10/4/2022 0800)  Achieves optimal ventilation and oxygenation: Assess for changes in respiratory status     Problem: Cardiovascular - Adult  Goal: Maintains optimal cardiac output and hemodynamic stability  10/4/2022 1210 by Pancho De La Cruz RN  Outcome: Progressing  Flowsheets (Taken 10/4/2022 0800)  Maintains optimal cardiac output and hemodynamic stability: Monitor blood pressure and heart rate     Problem: Skin/Tissue Integrity - Adult  Goal: Skin integrity remains intact  10/4/2022 1210 by Pancho De La Cruz RN  Outcome: Progressing  Flowsheets  Taken 10/4/2022 1208  Skin Integrity Remains Intact: Monitor for areas of redness and/or skin breakdown  Taken 10/4/2022 0800  Skin Integrity Remains Intact: Monitor for areas of redness and/or skin breakdown  Note: Pt shows no signs of new skin breakdown. Patient turns self and Q2 turns in place as patient allows. Education provided on the importance of repositioning. Problem: Infection - Adult  Goal: Absence of infection at discharge  Outcome: Progressing  Flowsheets (Taken 10/4/2022 0800)  Absence of infection at discharge: Assess and monitor for signs and symptoms of infection     Problem: Metabolic/Fluid and Electrolytes - Adult  Goal: Electrolytes maintained within normal limits  10/4/2022 1210 by Pancho De La Cruz RN  Outcome: Progressing  Flowsheets (Taken 10/4/2022 0800)  Electrolytes maintained within normal limits: Monitor labs and assess patient for signs and symptoms of electrolyte imbalances     Problem: Hematologic - Adult  Goal: Maintains hematologic stability  Outcome: Progressing  Flowsheets (Taken 10/4/2022 0800)  Maintains hematologic stability: Assess for signs and symptoms of bleeding or hemorrhage     Problem: Skin/Tissue Integrity  Goal: Absence of new skin breakdown  Description: 1. Monitor for areas of redness and/or skin breakdown  2. Assess vascular access sites hourly  3. Every 4-6 hours minimum:  Change oxygen saturation probe site  4.   Every 4-6 hours:  If on nasal continuous positive airway pressure, respiratory therapy assess nares and determine need for appliance change or resting period. 10/4/2022 1210 by Michelle Bardales RN  Outcome: Progressing  Note: Pt shows no signs of new skin breakdown. Patient turns self and Q2 turns in place as patient allows. Education provided on the importance of repositioning. Problem: Chronic Conditions and Co-morbidities  Goal: Patient's chronic conditions and co-morbidity symptoms are monitored and maintained or improved  10/4/2022 1210 by Michelle Bardales RN  Outcome: Progressing  Flowsheets (Taken 10/4/2022 0800)  Care Plan - Patient's Chronic Conditions and Co-Morbidity Symptoms are Monitored and Maintained or Improved: Monitor and assess patient's chronic conditions and comorbid symptoms for stability, deterioration, or improvement     Problem: Nutrition Deficit:  Goal: Optimize nutritional status  10/4/2022 1210 by Michelle Bardales RN  Outcome: Progressing  Flowsheets (Taken 10/4/2022 1210)  Nutrient intake appropriate for improving, restoring, or maintaining nutritional needs:   Assess nutritional status and recommend course of action   Recommend appropriate diets, oral nutritional supplements, and vitamin/mineral supplements   Monitor oral intake, labs, and treatment plans     Problem: Neurosensory - Adult  Goal: Achieves maximal functionality and self care  Outcome: Progressing  Flowsheets (Taken 10/4/2022 0800)  Achieves maximal functionality and self care: Monitor swallowing and airway patency with patient fatigue and changes in neurological status     Care plan reviewed with patient. Patient verbalize understanding of the plan of care and contribute to goal setting.

## 2022-10-04 NOTE — PROCEDURES
Chemical Pneumolysis    Risks and benefits to the procedure were discussed. Alternatives and their risks were discussed as well. Indication:  Loculated pleural effusion. Procedure:  After informed consent was obtained, 20 mg tPA +5 mg DNase were administered through the chest tube. Chest tube was clamped. Nurse was instructed on how to unclamp the chest tube. Nurse was instructed when to unclamp the chest tube. Procedure was performed without difficulty. There was no blood loss.     Electronically signed by Mikey Keys M.D.

## 2022-10-04 NOTE — PROGRESS NOTES
Internal Medicine Resident Progress Note    Patient:  Cinthia Wallace    YOB: 1965  Unit/Bed:4K-14/014-A  MRN: [id number]    Acct: [de-identified]   PCP: Lea Thomas MD    Date of Admission: 9/28/2022      Assessment/Plan:  Pleural effusion, right- likely secondary to hemodialysis noncomplaince  - S/p chest tube 09/28; clamped for chemical pneumolysis with follow up instructions for unclamping.  - Exudative in nature per pleural studies; cytology negative for malignant cells   - 210 cc serosanguinous drainage last 24 hours  - Continue to monitor output  - S/p chemical pneumolysis with tPA and dornase injection 10/4    Right lower lobe infiltrate  - Noted on CTA of the chest on 10/02  - Associated with exudative pleural effusion  - Chest tube in place; currently clamped for chemical pneumolysis  - Patient started on 30 day course of doxycycline by intensivist on 10/3  - Incentive spirometry, bronchodilation per intensivist recommendations  - Repeat imaging to assess for interval changes per intensivist  - Intensivist considering starting the patient on low-dose decadron    ESRD, on hemodialysis  - Patient on MWF dialysis schedule  - Nephrology following  - Judicious IV fluids and diuresis per nephrology recommendations  - Strict I's and O's    Systolic heart failure with preserved ejection fraction  - Echo 9/29 increased left ventricular wall thickness with EF 60%  - Strict I's and O's, daily weights  - Judicious IV fluids and diuresis per nephrology  - Continue Imdur    Entrapped Lung, Right  - S/p chemical pneumonitis 10/4  - Chest tube in place, currently clamped for chemical pneumolysis  - Patient started on 30 days of doxycycline per intensivist    Hypertrophic cardiomyopathy  - Echo 9/29 showing severe left ventricular concentric hypertrophy as well as dilation of the left atrium  - Continue aspirin, statin, isosorbide mononitrate, verapamil  - Strict I's and O's, daily weights  - Judicious IV fluids and diuresis per nephrology    Hyperphosphatemia  - Currently on MWF dialysis schedule  - Continue low phosphorus diet  - Patient's home phosphorus binder resumed  - Trend phosphorus and BMP    Primary HTN  - Continue clonidine, doxazosin, and verapamil with parameters    PTSD  - Continue sertraline, Atarax, trazodone  - Mood stable    Unspecified depressive disorder  - Continue sertraline  - Mood stable    GERD  - Continue PPI    Polysubstance abuse  - Patient counseled on substance abuse cessation    HLD  - Continue atorvastatin    Anemia of chronic disease  - Trend CBC  - Continue iron and folate supplementation    REZA  - Patient tolerating BiPAP at night  - Recommendation for outpatient follow-up for PAP titration with sleep clinic    Thoracic spondylosis  - Noted on CTA of the chest on 10/02  - PT/OT consulted    Hyperkalemia, resolved    Apical pneumothorax, right, resolved  Hx Thoracic Aneurysm  Hx AAA s/p aortic stent graft 7/2018  Hx COVID-19 pneumonia  Hx atrial flutter  Hx peripheral vascular disease  Hx hypertensive emergency  - Noted    Expected discharge date:  10/06    Disposition:   [x] Home  [] TCU  [] Rehab  [] Psych  [] SNF  [] [facility]  [] Other-    ===================================================================      Chief Complaint: Shortness of breath    Hospital Course: This is a 22-year-old -American male who presented to [address] [place] [address] on 09/28/2022 with worsening shortness of breath at rest and with exertion. CXR showed large right pleural effusion and patient required PAP therapy for adequate oxygenation. Patient was admitted and chest tube placed on 09/28 with 2 L of serosanguineous fluid drainage. Dialysis also performed at 09/28 with 2 L of fluid. Repeat dialysis performed on 09/29 with albumin administration. Repeat dialysis performed on 09/30/2022. Pleural fluid analysis was consistent with exudative effusion with negative cytology.   Repeat CTA of the chest showed improvement in the right-sided pleural effusion, but showed evidence of a right apical pneumothorax which resolved on subsequent imaging. Repeat dialysis performed on 10/03 with 2 L removed. Dornase and alteplase administered through chest tube on 10/04 by intensivist.      Subjective (past 24 hours):   Patient seen and evaluated at the bedside. He denies chest pain, fever, chills, nausea, vomiting, and diarrhea. Chest tube output overnight 70 cc of serosanguineous fluid. He is tolerating dialysis. He denies any other acute symptoms or concerns. ROS: reviewed complete ROS unchanged unless otherwise stated in hospital course/subjective portion.        Medications:  Reviewed    Infusion Medications    sodium chloride       Scheduled Medications    doxycycline hyclate  100 mg Oral 2 times per day    dexamethasone  0.5 mg Oral Daily    aspirin  81 mg Oral Daily    atorvastatin  40 mg Oral Nightly    cloNIDine  0.3 mg Oral TID    doxazosin  8 mg Oral Nightly    famotidine  20 mg Oral Daily    ferrous sulfate  325 mg Oral Daily with breakfast    folic acid  6,974 mcg Oral Daily    fluticasone  1 spray Nasal Daily    isosorbide mononitrate  120 mg Oral Daily    sertraline  100 mg Oral Daily    verapamil  240 mg Oral Daily    multivitamin  1 tablet Oral Daily    sodium chloride flush  10 mL IntraVENous 2 times per day    heparin (porcine)  5,000 Units SubCUTAneous 3 times per day     PRN Meds: hydrOXYzine HCl, traZODone, sodium chloride flush, sodium chloride, ondansetron **OR** ondansetron, polyethylene glycol, acetaminophen, HYDROcodone 5 mg - acetaminophen **OR** HYDROcodone 5 mg - acetaminophen, morphine        Intake/Output Summary (Last 24 hours) at 10/4/2022 1314  Last data filed at 10/4/2022 1033  Gross per 24 hour   Intake 300 ml   Output 210 ml   Net 90 ml       Exam:  BP (!) 152/83   Pulse 95   Temp 99.7 °F (37.6 °C) (Oral)   Resp 20   Ht 6' 3\" (1.905 m)   Wt 212 lb 8.4 oz (96.4 kg) SpO2 95%   BMI 26.56 kg/m²     General: No distress, appears stated age. Eyes:  PERRL. Conjunctivae/corneas clear. HENT: Head normal appearing. Nares normal. Oral mucosa moist.  Hearing intact. Neck: Supple, with full range of motion. Trachea midline. No gross JVD appreciated. Respiratory:  Normal effort. Clear to auscultation, without rales or wheezes or rhonchi. Chest tube in place with 80 cc serosanguineous fluid overnight noted. Nasal cannula in place. Cardiovascular: Normal rate, regular rhythm with normal S1/S2 without murmurs. No lower extremity edema. Abdomen: Soft, non-tender, non-distended with normal bowel sounds. Musculoskeletal: No joint swelling or tenderness. Normal tone. No abnormal movements. Skin: Warm and dry. No rashes or lesions. Neurologic:  No focal sensory/motor deficits in the upper or lower extremities. Cranial nerves:  grossly non-focal 2-12. Psychiatric: Alert and oriented, normal insight and thought content. Capillary Refill: Brisk,< 3 seconds. Peripheral Pulses: +2 palpable, equal bilaterally. Labs:   Recent Labs     10/02/22  0340 10/03/22  0443 10/04/22  0336   WBC 4.5* 3.7* 3.2*   HGB 10.5* 10.2* 9.7*   HCT 35.0* 33.6* 32.4*    92* 97*     Recent Labs     10/02/22  0340 10/02/22  0836 10/03/22  0443 10/03/22  1321 10/04/22  0336     --  133*  --  136   K 5.6*   < > 5.9* 4.5 5.0   CL 98  --  97*  --  100   CO2 22*  --  23  --  24   BUN 52*  --  73*  --  38*   CREATININE 8.2*  --  10.3*  --  7.7*   CALCIUM 9.0  --  8.9  --  9.0   PHOS  --   --   --   --  5.4*    < > = values in this interval not displayed. No results for input(s): AST, ALT, BILIDIR, BILITOT, ALKPHOS in the last 72 hours. No results for input(s): INR in the last 72 hours. No results for input(s): Normajean Massillon in the last 72 hours. No results for input(s): PROCAL in the last 72 hours.    Lab Results   Component Value Date/Time    NITRU NEGATIVE 04/03/2018 07:45 PM WBCUA 2-4 04/03/2018 07:45 PM    BACTERIA NONE 04/03/2018 07:45 PM    RBCUA 5-10 04/03/2018 07:45 PM    BLOODU SMALL 04/03/2018 07:45 PM    SPECGRAV 1.014 03/07/2018 09:10 PM    GLUCOSEU NEGATIVE 04/03/2018 07:45 PM       Radiology (48 hours):  CTA CHEST W WO CONTRAST    Result Date: 10/2/2022   1. Endoluminal graft in the thoracic aorta. This appears slightly smaller than on remote study dated 2/26/2020. There is no definite evidence of an endoleak. Please correlate clinically. 2. Cardiomegaly. Mitral valve calcification. 3. Right-sided chest tube. Small pneumothorax and effusion at the right lung base. Right lower lobe infiltrate. 4. Evidence for granulomatous disease in the right lower lobe, right hilum and mediastinum. 5. Thoracic spondylosis. 6. Old granulomatous disease in the spleen. 7. Small kidneys bilaterally. **This report has been created using voice recognition software. It may contain minor errors which are inherent in voice recognition technology. ** Final report electronically signed by DR Jesus Mcgovern on 10/2/2022 11:48 AM    XR CHEST PORTABLE    Result Date: 10/4/2022  1. Persistent pleural-parenchymal changes in the right mid and lower lung zone. There is a chest tube in place. No pneumothorax is identified on today's film. 2. Hazy appearance to the left mid and lower lung zone. This is unchanged. **This report has been created using voice recognition software. It may contain minor errors which are inherent in voice recognition technology. ** Final report electronically signed by Dr. Aracely Mendez on 10/4/2022 7:57 AM    XR CHEST PORTABLE    Result Date: 10/3/2022  1. Aneurysmal aortic arch. An aortic endograft is present in the aortic arch and proximal descending thoracic aorta. 2. Moderate cardiomegaly. Small caliber pigtail catheter in the pleural space right side. Small pleural effusion right side. Moderate right basilar atelectasis/pneumonia. 3. Small less than 10% pneumothorax right apex. Author Krystyna **This report has been created using voice recognition software. It may contain minor errors which are inherent in voice recognition technology. ** Final report electronically signed by Dr. Kaiden Bautista on 10/3/2022 1:38 PM       DVT prophylaxis:    [] Lovenox  [] SCDs  [x] SQ Heparin  [] Encourage ambulation   [] Already on Anticoagulation       Diet: ADULT DIET; Regular; 5 carb choices (75 gm/meal); Low Fat/Low Chol/High Fiber/2 gm Na; Low Sodium (2 gm); Low Potassium (Less than 3000 mg/day);  Low Phosphorus (Less than 1000 mg)  Code Status: Full Code  PT/OT: consulted      Electronically signed by Alessio Hooper MD on 10/4/2022 at 1:14 PM    Case was discussed with Attending, Dr. Dick Mcneill

## 2022-10-04 NOTE — FLOWSHEET NOTE
10/04/22 1013   Safe Environment   Safety Measures Other (comment)  (virtual nurse safety round complete)   Pt replies to audio , self and role introduced , pt denied needs or complaints

## 2022-10-04 NOTE — PROGRESS NOTES
Bubba Whalen was evaluated today and a DME order was entered for a wheeled walker because he requires this to successfully complete daily living tasks of eating, bathing, toileting, personal cares, ambulating, grooming, hygiene, dressing upper body, dressing lower body, meal preparation, and taking own medications. A wheeled walker is necessary due to the patient's unsteady gait, upper body weakness, and inability to  an ambulation device; and he can ambulate only by pushing a walker instead of a lesser assistive device such as a cane, crutch, or standard walker. The need for this equipment was discussed with the patient and he understands and is in agreement.

## 2022-10-05 ENCOUNTER — APPOINTMENT (OUTPATIENT)
Dept: GENERAL RADIOLOGY | Age: 57
DRG: 163 | End: 2022-10-05
Payer: MEDICARE

## 2022-10-05 LAB
ANION GAP SERPL CALCULATED.3IONS-SCNC: 15 MEQ/L (ref 8–16)
BUN BLDV-MCNC: 62 MG/DL (ref 7–22)
CALCIUM SERPL-MCNC: 9.1 MG/DL (ref 8.5–10.5)
CHLORIDE BLD-SCNC: 100 MEQ/L (ref 98–111)
CO2: 22 MEQ/L (ref 23–33)
CREAT SERPL-MCNC: 9.6 MG/DL (ref 0.4–1.2)
ERYTHROCYTE [DISTWIDTH] IN BLOOD BY AUTOMATED COUNT: 12.8 % (ref 11.5–14.5)
ERYTHROCYTE [DISTWIDTH] IN BLOOD BY AUTOMATED COUNT: 47.7 FL (ref 35–45)
GFR SERPL CREATININE-BSD FRML MDRD: 7 ML/MIN/1.73M2
GLUCOSE BLD-MCNC: 147 MG/DL (ref 70–108)
GLUCOSE BLD-MCNC: 77 MG/DL (ref 70–108)
HCT VFR BLD CALC: 32.1 % (ref 42–52)
HEMOGLOBIN: 9.8 GM/DL (ref 14–18)
MAGNESIUM: 2.5 MG/DL (ref 1.6–2.4)
MCH RBC QN AUTO: 31.3 PG (ref 26–33)
MCHC RBC AUTO-ENTMCNC: 30.5 GM/DL (ref 32.2–35.5)
MCV RBC AUTO: 102.6 FL (ref 80–94)
PHOSPHORUS: 4.7 MG/DL (ref 2.4–4.7)
PLATELET # BLD: 101 THOU/MM3 (ref 130–400)
PMV BLD AUTO: 10.1 FL (ref 9.4–12.4)
POTASSIUM SERPL-SCNC: 4.3 MEQ/L (ref 3.5–5.2)
POTASSIUM SERPL-SCNC: 5.7 MEQ/L (ref 3.5–5.2)
RBC # BLD: 3.13 MILL/MM3 (ref 4.7–6.1)
SODIUM BLD-SCNC: 137 MEQ/L (ref 135–145)
WBC # BLD: 4 THOU/MM3 (ref 4.8–10.8)

## 2022-10-05 PROCEDURE — 6370000000 HC RX 637 (ALT 250 FOR IP): Performed by: STUDENT IN AN ORGANIZED HEALTH CARE EDUCATION/TRAINING PROGRAM

## 2022-10-05 PROCEDURE — 6360000002 HC RX W HCPCS: Performed by: INTERNAL MEDICINE

## 2022-10-05 PROCEDURE — 82948 REAGENT STRIP/BLOOD GLUCOSE: CPT

## 2022-10-05 PROCEDURE — 6360000002 HC RX W HCPCS: Performed by: PHYSICIAN ASSISTANT

## 2022-10-05 PROCEDURE — 90935 HEMODIALYSIS ONE EVALUATION: CPT

## 2022-10-05 PROCEDURE — 85027 COMPLETE CBC AUTOMATED: CPT

## 2022-10-05 PROCEDURE — 71045 X-RAY EXAM CHEST 1 VIEW: CPT

## 2022-10-05 PROCEDURE — 6370000000 HC RX 637 (ALT 250 FOR IP): Performed by: NURSE PRACTITIONER

## 2022-10-05 PROCEDURE — 6370000000 HC RX 637 (ALT 250 FOR IP)

## 2022-10-05 PROCEDURE — 6370000000 HC RX 637 (ALT 250 FOR IP): Performed by: INTERNAL MEDICINE

## 2022-10-05 PROCEDURE — 6370000000 HC RX 637 (ALT 250 FOR IP): Performed by: PHYSICIAN ASSISTANT

## 2022-10-05 PROCEDURE — 2060000000 HC ICU INTERMEDIATE R&B

## 2022-10-05 PROCEDURE — 84132 ASSAY OF SERUM POTASSIUM: CPT

## 2022-10-05 PROCEDURE — 80048 BASIC METABOLIC PNL TOTAL CA: CPT

## 2022-10-05 PROCEDURE — 99233 SBSQ HOSP IP/OBS HIGH 50: CPT | Performed by: INTERNAL MEDICINE

## 2022-10-05 PROCEDURE — 36415 COLL VENOUS BLD VENIPUNCTURE: CPT

## 2022-10-05 PROCEDURE — 90935 HEMODIALYSIS ONE EVALUATION: CPT | Performed by: INTERNAL MEDICINE

## 2022-10-05 PROCEDURE — 83735 ASSAY OF MAGNESIUM: CPT

## 2022-10-05 PROCEDURE — 99232 SBSQ HOSP IP/OBS MODERATE 35: CPT | Performed by: INTERNAL MEDICINE

## 2022-10-05 PROCEDURE — 84100 ASSAY OF PHOSPHORUS: CPT

## 2022-10-05 RX ADMIN — HEPARIN SODIUM 5000 UNITS: 5000 INJECTION INTRAVENOUS; SUBCUTANEOUS at 06:13

## 2022-10-05 RX ADMIN — HEPARIN SODIUM 5000 UNITS: 5000 INJECTION INTRAVENOUS; SUBCUTANEOUS at 21:23

## 2022-10-05 RX ADMIN — CLONIDINE HYDROCHLORIDE 0.3 MG: 0.2 TABLET ORAL at 15:39

## 2022-10-05 RX ADMIN — DOCUSATE SODIUM 100 MG: 100 CAPSULE, LIQUID FILLED ORAL at 21:23

## 2022-10-05 RX ADMIN — DEXAMETHASONE 0.5 MG: 0.5 TABLET ORAL at 13:28

## 2022-10-05 RX ADMIN — FLUTICASONE PROPIONATE 1 SPRAY: 50 SPRAY, METERED NASAL at 13:28

## 2022-10-05 RX ADMIN — SODIUM ZIRCONIUM CYCLOSILICATE 10 G: 10 POWDER, FOR SUSPENSION ORAL at 15:38

## 2022-10-05 RX ADMIN — Medication 1 TABLET: at 13:27

## 2022-10-05 RX ADMIN — ISOSORBIDE MONONITRATE 120 MG: 60 TABLET, EXTENDED RELEASE ORAL at 06:12

## 2022-10-05 RX ADMIN — SENNOSIDES 8.6 MG: 8.6 TABLET, COATED ORAL at 21:23

## 2022-10-05 RX ADMIN — VERAPAMIL HYDROCHLORIDE 240 MG: 240 TABLET, FILM COATED, EXTENDED RELEASE ORAL at 06:12

## 2022-10-05 RX ADMIN — DOXAZOSIN 8 MG: 4 TABLET ORAL at 21:23

## 2022-10-05 RX ADMIN — ATORVASTATIN CALCIUM 40 MG: 40 TABLET, FILM COATED ORAL at 21:23

## 2022-10-05 RX ADMIN — CLONIDINE HYDROCHLORIDE 0.3 MG: 0.2 TABLET ORAL at 21:23

## 2022-10-05 RX ADMIN — ASPIRIN 81 MG: 81 TABLET, COATED ORAL at 13:27

## 2022-10-05 RX ADMIN — DOXYCYCLINE HYCLATE 100 MG: 100 TABLET, COATED ORAL at 13:28

## 2022-10-05 RX ADMIN — DOCUSATE SODIUM 100 MG: 100 CAPSULE, LIQUID FILLED ORAL at 13:28

## 2022-10-05 RX ADMIN — POLYETHYLENE GLYCOL 3350 17 G: 17 POWDER, FOR SOLUTION ORAL at 06:17

## 2022-10-05 RX ADMIN — HYDROCODONE BITARTRATE AND ACETAMINOPHEN 2 TABLET: 5; 325 TABLET ORAL at 13:33

## 2022-10-05 RX ADMIN — CLONIDINE HYDROCHLORIDE 0.3 MG: 0.2 TABLET ORAL at 06:13

## 2022-10-05 RX ADMIN — DOXYCYCLINE HYCLATE 100 MG: 100 TABLET, COATED ORAL at 21:23

## 2022-10-05 RX ADMIN — HEPARIN SODIUM 5000 UNITS: 5000 INJECTION INTRAVENOUS; SUBCUTANEOUS at 15:39

## 2022-10-05 RX ADMIN — FAMOTIDINE 20 MG: 20 TABLET ORAL at 13:28

## 2022-10-05 RX ADMIN — FOLIC ACID 1000 MCG: 1 TABLET ORAL at 13:27

## 2022-10-05 RX ADMIN — SERTRALINE 100 MG: 100 TABLET, FILM COATED ORAL at 13:28

## 2022-10-05 RX ADMIN — FERROUS SULFATE TAB 325 MG (65 MG ELEMENTAL FE) 325 MG: 325 (65 FE) TAB at 15:38

## 2022-10-05 RX ADMIN — HYDROCODONE BITARTRATE AND ACETAMINOPHEN 2 TABLET: 5; 325 TABLET ORAL at 21:27

## 2022-10-05 ASSESSMENT — ENCOUNTER SYMPTOMS
SORE THROAT: 0
VOMITING: 0
TROUBLE SWALLOWING: 0
ABDOMINAL PAIN: 0
NAUSEA: 0
PHOTOPHOBIA: 0
WHEEZING: 0
SHORTNESS OF BREATH: 0
COLOR CHANGE: 0
CHEST TIGHTNESS: 0
BACK PAIN: 0

## 2022-10-05 ASSESSMENT — PAIN DESCRIPTION - PAIN TYPE: TYPE: ACUTE PAIN

## 2022-10-05 ASSESSMENT — PAIN DESCRIPTION - ORIENTATION
ORIENTATION: RIGHT;LOWER
ORIENTATION: RIGHT
ORIENTATION: RIGHT;LOWER

## 2022-10-05 ASSESSMENT — PAIN DESCRIPTION - LOCATION
LOCATION: FLANK
LOCATION: CHEST
LOCATION: CHEST;FLANK

## 2022-10-05 ASSESSMENT — PAIN SCALES - GENERAL
PAINLEVEL_OUTOF10: 8
PAINLEVEL_OUTOF10: 3
PAINLEVEL_OUTOF10: 7

## 2022-10-05 ASSESSMENT — PAIN - FUNCTIONAL ASSESSMENT
PAIN_FUNCTIONAL_ASSESSMENT: ACTIVITIES ARE NOT PREVENTED
PAIN_FUNCTIONAL_ASSESSMENT: PREVENTS OR INTERFERES SOME ACTIVE ACTIVITIES AND ADLS

## 2022-10-05 ASSESSMENT — PAIN DESCRIPTION - FREQUENCY: FREQUENCY: INTERMITTENT

## 2022-10-05 ASSESSMENT — PAIN DESCRIPTION - DESCRIPTORS
DESCRIPTORS: ACHING;SHARP
DESCRIPTORS: ACHING
DESCRIPTORS: ACHING

## 2022-10-05 ASSESSMENT — PAIN DESCRIPTION - ONSET: ONSET: ON-GOING

## 2022-10-05 NOTE — FLOWSHEET NOTE
Stable 4 hour treatment complete. Removed 2 liters of fluid as per order. Tolerated fluid removal well. Pressure held to needle sites times ten minutes each. Dressing clean, dry and intact. Report given to primary RN. Treatment record printed for scanning into EMR. 10/05/22 0745 10/05/22 1210   Vital Signs   /65 (!) 159/84   Temp (!) 96.3 °F (35.7 °C) 98.1 °F (36.7 °C)   Heart Rate 73 76   Resp 14 18   SpO2  --  97 %   Weight 208 lb 15.9 oz (94.8 kg) 206 lb 12.7 oz (93.8 kg)   Weight Method Bed scale Bed scale   Post-Hemodialysis Assessment   Post-Treatment Procedures  --  Blood returned; Access bleeding time < 10 minutes   Machine Disinfection Process  --  Acid/Vinegar Clean;Heat Disinfect; Exterior Machine Disinfection   Blood Volume Processed (Liters)  --  100.7 l/min   Dialyzer Clearance  --  Lightly streaked   Duration of Treatment (minutes)  --  240 minutes   Heparin Amount Administered During Treatment (mL)  --  0 mL   Hemodialysis Intake (ml)  --  400 ml   Hemodialysis Output (ml)  --  2400 ml   NET Removed (ml)  --  2000   Tolerated Treatment  --  Good

## 2022-10-05 NOTE — PROGRESS NOTES
Internal Medicine Resident Progress Note    Patient:  Wilmer Gregory    YOB: 1965  Unit/Bed:-14/014-A  MRN: [id number]    Acct: [de-identified]   PCP: Sadi Naik MD    Date of Admission: 9/28/2022      Assessment/Plan:  Pleural effusion, right  - Multifactorial; likely secondary to hemodialysis noncompliance  - S/p chest tube 09/28  - S/p chemical pneumolysis 10/04 with tPA and dornase injection  - 240 cc serosanguineous drainage in the last 24 hours  - Pleural studies indicating exudative effusion with cytology negative for malignant cells  - Plan for bronchoscopy on 10/06 per pulmonologist    Acute hypoxic respiratory failure  - Patient maintaining adequate oxygenation on room air; titrate to maintain SPO2 90 to 96%  - S/p chest tube placement 09/28  - Likely secondary to pleural effusion as well as Hx polysubstance abuse    Trapped lung, right  - S/p chest tube 09/28  - S/p chemical pneumolysis 10/04 with tPA and dornase injection  - 240 cc serosanguineous drainage in the last 24 hours  - Pleural studies indicating exudative effusion with cytology negative for malignant cells  - Plan for bronchoscopy on 10/06 per pulmonologist    Right lower lobe infiltrate  - Noted on CTA of the chest on 10/02  - Associated with exudative pleural effusion  - Chest tube in place, s/p chemical neurolysis  - Continue to monitor for output from chest tube  - Plan for bronchoscopy on 10/06  - Day 2/30 doxycycline per pulmonology recommendation  - Incentive spirometry, bronchodilation per pulmonology recommendations  - Repeat imaging to assess for interval changes per pulmonology  - Pulmonology considering starting the patient on low-dose decadron    ESRD, on hemodialysis  - Patient on MWF dialysis schedule  - Continue with low phosphorus and low potassium diet  - Nephrology following  - Judicious IV fluids and diuresis per nephrology recommendations  - Strict I's and O's  - 10/05 dialysis with 2 L fluid removal    Systolic heart failure with preserved ejection fraction  - Echo 9/29 increased left ventricular wall thickness with EF 60%  - Strict I's and O's, daily weights  - Judicious IV fluids and diuresis per nephrology  - Continue Imdur    Hypertrophic cardiomyopathy  - Echo 9/29 showing severe left ventricular concentric hypertrophy as well as dilation of the left atrium  - Continue aspirin, statin, isosorbide mononitrate, verapamil  - Strict I's and O's, daily weights  - Judicious IV fluids and diuresis per nephrology    Pneumothorax, right lower lobe  - Noted on CXR on 10/04  - Chest tube in place  - Advance care recommendations per pulmonology  - Bronchoscopy planned on 10/06    Primary HTN  - Continue clonidine, doxazosin, and verapamil with parameters    PTSD  - Continue sertraline, Atarax, trazodone  - Mood stable    Unspecified depressive disorder  - Continue sertraline  - Mood stable    GERD  - Continue PPI    Polysubstance abuse  - Patient counseled on substance abuse cessation    HLD  - Continue atorvastatin    Anemia of chronic disease  - Trend CBC  - Continue iron and folate supplementation    REZA  - Patient tolerating BiPAP at night  - Recommendation for outpatient follow-up for PAP titration with sleep clinic    Thoracic spondylosis  - Noted on CTA of the chest on 10/02  - PT/OT consulted; 10/05 [facility] 15    Hyperkalemia, resolved     Hyperphosphatemia, resolved  - Currently on MWF dialysis schedule  - Continue low phosphorus diet  - Patient's home phosphorus binder resumed  - Trend phosphorus and BMP    Apical pneumothorax, right, resolved  Hx Thoracic Aneurysm  Hx AAA s/p aortic stent graft 7/2018  Hx COVID-19 pneumonia  Hx atrial flutter  Hx peripheral vascular disease  Hx hypertensive emergency  - Noted    Expected discharge date: 10/06    Disposition:   [x] Home  [] TCU  [] Rehab  [] Psych  [] SNF  [] [place]  [] Other-    ===================================================================      Chief Complaint: Shortness of breath    Hospital Course: This is a 68-year-old -American male who presented to [address] [place] [address] on 09/28/2022 with worsening shortness of breath at rest and with exertion. CXR showed large right pleural effusion and patient required PAP therapy for adequate oxygenation. Patient was admitted and chest tube placed on 09/28 with 2 L of serosanguineous fluid drainage. Dialysis also performed at 09/28 with 2 L of fluid. Repeat dialysis performed on 09/29 with albumin administration. Repeat dialysis performed on 09/30/2022. Pleural fluid analysis was consistent with exudative effusion with negative cytology. Repeat CTA of the chest showed improvement in the right-sided pleural effusion, but showed evidence of a right apical pneumothorax which resolved on subsequent imaging. Repeat dialysis performed on 10/03 with 2 L removed. Dornase and alteplase administered through chest tube on 10/04 by intensivist.  Hemodialysis performed on 10/05 with 2 L of fluid removed. Subjective (past 24 hours):   Patient seen and evaluated at the bedside prior to dialysis. He denies chest pain, fever, chills, nausea, vomiting, and diarrhea. Chest tube drained 240 mL over the last 24 hours. He denies any additional symptoms or concerns at this time. ROS: reviewed complete ROS unchanged unless otherwise stated in hospital course/subjective portion.        Medications:  Reviewed    Infusion Medications    sodium chloride       Scheduled Medications    sodium zirconium cyclosilicate  10 g Oral Once    doxycycline hyclate  100 mg Oral 2 times per day    dexamethasone  0.5 mg Oral Daily    docusate sodium  100 mg Oral BID    senna  1 tablet Oral Nightly    aspirin  81 mg Oral Daily    atorvastatin  40 mg Oral Nightly    cloNIDine  0.3 mg Oral TID    doxazosin  8 mg Oral Nightly    famotidine  20 mg Oral Daily    ferrous sulfate  325 mg Oral Daily with breakfast    folic acid  4,913 mcg Oral Daily    fluticasone  1 spray Nasal Daily    isosorbide mononitrate  120 mg Oral Daily    sertraline  100 mg Oral Daily    verapamil  240 mg Oral Daily    multivitamin  1 tablet Oral Daily    sodium chloride flush  10 mL IntraVENous 2 times per day    heparin (porcine)  5,000 Units SubCUTAneous 3 times per day     PRN Meds: hydrOXYzine HCl, traZODone, sodium chloride flush, sodium chloride, ondansetron **OR** ondansetron, polyethylene glycol, acetaminophen, HYDROcodone 5 mg - acetaminophen **OR** HYDROcodone 5 mg - acetaminophen, morphine        Intake/Output Summary (Last 24 hours) at 10/5/2022 0918  Last data filed at 10/5/2022 0329  Gross per 24 hour   Intake 800 ml   Output 560 ml   Net 240 ml       Exam:  /65   Pulse 73   Temp (!) 96.3 °F (35.7 °C)   Resp 14   Ht 6' 3\" (1.905 m)   Wt 208 lb 15.9 oz (94.8 kg)   SpO2 95%   BMI 26.12 kg/m²     General: No distress, appears stated age. Eyes:  PERRL. Conjunctivae/corneas clear. HENT: Head normal appearing. Nares normal. Oral mucosa moist.  Hearing intact. Neck: Supple, with full range of motion. Trachea midline. No gross JVD appreciated. Respiratory: Diminished breath sounds bilaterally. No wheezes, crackles, or rhonchi. Chest tube in place with 240 cc of serosanguineous fluid drainage noted. Patient on room air. Cardiovascular: Normal rate, regular rhythm with normal S1/S2 without murmurs. No lower extremity edema. Abdomen: Soft, non-tender, non-distended with normal bowel sounds. Musculoskeletal: No joint swelling or tenderness. Normal tone. No abnormal movements. Skin: Warm and dry. No rashes or lesions. Neurologic:  No focal sensory/motor deficits in the upper or lower extremities. Cranial nerves:  grossly non-focal 2-12. Psychiatric: Alert and oriented, normal insight and thought content. Capillary Refill: Brisk,< 3 seconds.   Peripheral Pulses: +2 palpable, equal bilaterally. Labs:   Recent Labs     10/03/22  0443 10/04/22  0336 10/05/22  0320   WBC 3.7* 3.2* 4.0*   HGB 10.2* 9.7* 9.8*   HCT 33.6* 32.4* 32.1*   PLT 92* 97* 101*     Recent Labs     10/03/22  0443 10/03/22  1321 10/04/22  0336 10/05/22  0320   *  --  136 137   K 5.9* 4.5 5.0 5.7*   CL 97*  --  100 100   CO2 23  --  24 22*   BUN 73*  --  38* 62*   CREATININE 10.3*  --  7.7* 9.6*   CALCIUM 8.9  --  9.0 9.1   PHOS  --   --  5.4* 4.7     No results for input(s): AST, ALT, BILIDIR, BILITOT, ALKPHOS in the last 72 hours. No results for input(s): INR in the last 72 hours. No results for input(s): Pamla Clines in the last 72 hours. No results for input(s): PROCAL in the last 72 hours. Lab Results   Component Value Date/Time    NITRU NEGATIVE 04/03/2018 07:45 PM    WBCUA 2-4 04/03/2018 07:45 PM    BACTERIA NONE 04/03/2018 07:45 PM    RBCUA 5-10 04/03/2018 07:45 PM    BLOODU SMALL 04/03/2018 07:45 PM    SPECGRAV 1.014 03/07/2018 09:10 PM    GLUCOSEU NEGATIVE 04/03/2018 07:45 PM       Radiology (48 hours):  XR CHEST PORTABLE    Result Date: 10/5/2022  Small pneumothorax at the right lung base. Chest tube tip is at the right lung base, grossly unchanged. Small right pleural effusion, decreased. Right lung base consolidation, increased, suspicious for atelectasis or pneumonia. Cardiomegaly. This document has been electronically signed by: Tiff Rivera MD on 10/05/2022 03:41 AM     XR CHEST PORTABLE    Result Date: 10/4/2022  1. Persistent pleural-parenchymal changes in the right mid and lower lung zone. There is a chest tube in place. No pneumothorax is identified on today's film. 2. Hazy appearance to the left mid and lower lung zone. This is unchanged. **This report has been created using voice recognition software. It may contain minor errors which are inherent in voice recognition technology. ** Final report electronically signed by Dr. Stanton Vallejo on 10/4/2022 7:57 AM    XR CHEST PORTABLE    Result Date: 10/3/2022  1. Aneurysmal aortic arch. An aortic endograft is present in the aortic arch and proximal descending thoracic aorta. 2. Moderate cardiomegaly. Small caliber pigtail catheter in the pleural space right side. Small pleural effusion right side. Moderate right basilar atelectasis/pneumonia. 3. Small less than 10% pneumothorax right apex. [facility] **This report has been created using voice recognition software. It may contain minor errors which are inherent in voice recognition technology. ** Final report electronically signed by Dr. Bert Gery on 10/3/2022 1:38 PM       DVT prophylaxis:    [] Lovenox  [] SCDs  [x] SQ Heparin  [] Encourage ambulation   [] Already on Anticoagulation       Diet: ADULT DIET; Regular; 5 carb choices (75 gm/meal); Low Fat/Low Chol/High Fiber/2 gm Na; Low Sodium (2 gm); Low Potassium (Less than 3000 mg/day);  Low Phosphorus (Less than 1000 mg)  Code Status: Full Code  PT/OT: Consulted  Tele: Continuous  IVF: Per nephrology    Electronically signed by Claudell Ferrara, MD on 10/5/2022 at 9:18 AM    Case was discussed with Attending, Dr. Lobito Krishnan

## 2022-10-05 NOTE — PROGRESS NOTES
PULMONARY PROGRESS NOTE      Patient:  Shania Duffy    Unit/Bed:4K-14/014-A  YOB: 1965  MRN: [id number]   PCP: Jerardo Echeverria MD  Date of Admission: 9/28/2022  Chief Complaint:-Acute Hypoxic respiratory failure     Assessment and Plan:       Acute hypoxic respiratory failure: Weaned to 3 L nasal cannula. Improved with chest tube placement. Chest tube to remain in place. Placed back to suction 10/3. Patient underwent chemical pneumolysis with tPA and dornase injection 10/4. Right pleural effusion: chest tube placed 9/28. Place back to suction. Chemical pneumolysis with tPA and dornase injection completed on 10/4. Resulted in 540 cc of output. Lung remains entrapped. Right entrapped lung: Plans for chemical pneumolysis 10/4. Maintain chest tube in place. Cytology negative for malignant cells x 3. Added 30 days of doxycycline 10/3. Plans for bronchoscopy 10/6. Right sided pulmonary consolidations: Associated with exudative pleural effusion. Doxycycline added. Will treat for 30 days. May benefit from low-dose Decadron to help attenuate consolidated process. History of thoracic and aortic aneurysm: status post stent graft 7/2018. Diabetes mellitus: Diet controlled  Hyperparathyroidism: Secondary to renal failure  Depression: Zoloft  GERD: Pepcid  History of substance abuse: History of cocaine use  Tobacco abuse:  every day smoker, recommend smoking cessation  Hyperlipidemia: Lipitor  Hypertension: Imdur, Cardura, Catapres. INITIAL H AND P AND ICU COURSE:  Nahed Kidd is a 57-year-old black male who presented to [facility] on 9/28/2022 with worsening shortness of breath. He has a past medical history of current every day smoker, AAA, stage renal disease, cocaine abuse, depression, diabetes mellitus, hyperlipidemia, hypertension, medical noncompliance, PTSD. Per report patient presented to the emergency department on 9/28 with worsening shortness of breath. Chest x-ray showed large right pleural effusion. Patient was placed on BiPAP. He was admitted to stepdown where he underwent a chest tube placement on 9/28 with 2 L of serosanguineous fluid. Dialysis was also performed on 9/28. He underwent albumin administration on 9/29. Repeat dialysis on 9/30. Chest tube drainage was noted for exudative effusion. 10/4 chest tube instilled with dornase and tPA. X-ray for tomorrow. Past Medical History: per HPI  Family History: Mother: Cancer  Social History: Current every day smoker, denies alcohol and drug use. Review of Systems   Constitutional:  Negative for fatigue and fever. HENT:  Negative for sore throat and trouble swallowing. Eyes:  Negative for photophobia. Respiratory:  Negative for chest tightness, shortness of breath and wheezing. Cardiovascular:  Negative for chest pain and leg swelling. Gastrointestinal:  Negative for abdominal pain, nausea and vomiting. Endocrine: Negative for polydipsia. Genitourinary:  Positive for decreased urine volume. Negative for flank pain. Musculoskeletal:  Negative for back pain and neck pain. Skin:  Negative for color change, pallor and rash. Allergic/Immunologic: Negative for food allergies. Neurological:  Positive for weakness. Negative for dizziness and numbness. Hematological:  Negative for adenopathy. Psychiatric/Behavioral:  Negative for agitation and confusion. The patient is nervous/anxious.         Scheduled Meds:   doxycycline hyclate  100 mg Oral 2 times per day    dexamethasone  0.5 mg Oral Daily    docusate sodium  100 mg Oral BID    senna  1 tablet Oral Nightly    aspirin  81 mg Oral Daily    atorvastatin  40 mg Oral Nightly    cloNIDine  0.3 mg Oral TID    doxazosin  8 mg Oral Nightly    famotidine  20 mg Oral Daily    ferrous sulfate  325 mg Oral Daily with breakfast    folic acid  7,993 mcg Oral Daily    fluticasone  1 spray Nasal Daily    isosorbide mononitrate  120 mg Oral Daily    sertraline  100 mg Oral Daily    verapamil  240 mg Oral Daily    multivitamin  1 tablet Oral Daily    sodium chloride flush  10 mL IntraVENous 2 times per day    heparin (porcine)  5,000 Units SubCUTAneous 3 times per day     Continuous Infusions:   sodium chloride         PHYSICAL EXAMINATION:  T:  97.3.  P:  83. RR:  16. B/P:  172/81. O2:  1.5. O2 Sat:  95.  I/O:  800/560  Body mass index is 26.67 kg/m². GCS:  15  General:     HEENT:  normocephalic and atraumatic. No scleral icterus. PERR  Neck: supple. No Thyromegaly. Lungs: clear to auscultation. No retractions. Right chest tube  Cardiac: RRR. No JVD. Abdomen: soft. Nontender. Extremities:  No clubbing, cyanosis, or edema x 4. Vasculature: capillary refill < 3 seconds. Palpable dorsalis pedis pulses. Skin:  warm and dry. Psych:  Alert and oriented x3. Affect appropriate  Lymph:  No supraclavicular adenopathy. Neurologic:  No focal deficit. No seizures. Data: (All radiographs, tracings, PFTs, and imaging are personally viewed and interpreted unless otherwise noted). Sodium 137, potassium 5.7, chloride 100, CO2 22, BUN 62, creatinine 9.6, anion gap 15, glucose 77. WBC 4.0, hemoglobin 9.8, hematocrit 32.1, platelet count 457  Telemetry shows normal sinus rhythm  Small pneumothorax on the right lung base. Small right pleural effusion is decreased. Right lobe consolidation concerning for atelectasis or pneumonia. Case and plan discussed with Dr. Patty Gilford. Electronically signed by Liliam Valdez. WADE Early CNP  CRITICAL CARE SPECIALIST   Patient seen by me including key components of medical care. Case discussed with nurse practitioner. Chest x-ray shows excellent chemical pneumo lysis results with clearing of pleural fluid. Unfortunately patient has entrapped lung, and the lung will not fully reexpand. We will proceed with bronchoscopy to exclude obstructing lesion.   Pending results, will discuss case with thoracic surgery for possible decortication and possible pleural biopsy. Mary Beth the risks and benefits of bronchoscopy with the patient as well as the alternatives and their risks and benefits with the patient. Patient has consented for bronchoscopy. .  Italicized font, if present,  represents changes to the note made by me. CC time 25 minutes. Time was discontiguous. Time does not include procedure. Time does include my direct assessment of the patient and coordination of care. Time represents more than 50% of the time involved with patient care by the [address] [place] [address] team.  Electronically signed by Shivam Peralta.  Flaquita Springer MD.

## 2022-10-05 NOTE — PROGRESS NOTES
Grayson L[facility] 62  OCCUPATIONAL THERAPY MISSED TREATMENT NOTE  [facility] ICU STEPDOWN TELEMETRY 4K  4K-14/014-A      Date: 10/5/2022  Patient Name: Katie Leal        CSN: [id number]   : 1965  (56 y.o.)  Gender: male   Referring Practitioner: Dr. Paul Peña MD  Diagnosis: Hypoxia         REASON FOR MISSED TREATMENT: Pt. Unavailable for OT treatment at dialysis. Will re attempt next OT date.

## 2022-10-05 NOTE — PLAN OF CARE
Problem: Discharge Planning  Goal: Discharge to home or other facility with appropriate resources  10/5/2022 0945 by Nannette Araujo RN  Outcome: Progressing  10/4/2022 2300 by Renuka Hogan RN  Outcome: Progressing  Flowsheets (Taken 10/4/2022 2300)  Discharge to home or other facility with appropriate resources:   Identify barriers to discharge with patient and caregiver   Identify discharge learning needs (meds, wound care, etc)     Problem: Pain  Goal: Verbalizes/displays adequate comfort level or baseline comfort level  10/5/2022 0945 by Nannette Araujo RN  Outcome: Progressing  10/4/2022 2300 by Renuka Hogan RN  Outcome: Progressing  Flowsheets (Taken 10/4/2022 2300)  Verbalizes/displays adequate comfort level or baseline comfort level:   Encourage patient to monitor pain and request assistance   Assess pain using appropriate pain scale   Administer analgesics based on type and severity of pain and evaluate response   Implement non-pharmacological measures as appropriate and evaluate response     Problem: Safety - Adult  Goal: Free from fall injury  10/5/2022 0945 by Nannette Araujo RN  Outcome: Progressing  10/4/2022 2300 by Renuka Hogan RN  Outcome: Progressing  Flowsheets (Taken 10/4/2022 2300)  Free From Fall Injury: Instruct family/caregiver on patient safety  Note: Patient free from falls this shift. Problem: ABCDS Injury Assessment  Goal: Absence of physical injury  10/5/2022 0945 by Nannette Araujo RN  Outcome: Progressing  10/4/2022 2300 by Renuka Hogan RN  Outcome: Progressing  Flowsheets (Taken 10/4/2022 2300)  Absence of Physical Injury: Implement safety measures based on patient assessment  Note: Bed alarm in use this shift.      Problem: Respiratory - Adult  Goal: Achieves optimal ventilation and oxygenation  10/5/2022 0945 by Nannette Araujo RN  Outcome: Progressing  10/4/2022 2300 by Renuka Hogan RN  Outcome: Progressing  Flowsheets (Taken 10/4/2022 2300)  Achieves optimal ventilation and oxygenation:   Assess for changes in respiratory status   Assess for changes in mentation and behavior   Position to facilitate oxygenation and minimize respiratory effort   Oxygen supplementation based on oxygen saturation or arterial blood gases   Encourage broncho-pulmonary hygiene including cough, deep breathe, incentive spirometry   Assess and instruct to report shortness of breath or any respiratory difficulty     Problem: Cardiovascular - Adult  Goal: Maintains optimal cardiac output and hemodynamic stability  10/5/2022 0945 by Eliu Zamora RN  Outcome: Progressing  10/4/2022 2300 by Alex Pryor RN  Outcome: Progressing  Flowsheets (Taken 10/4/2022 2300)  Maintains optimal cardiac output and hemodynamic stability:   Monitor blood pressure and heart rate   Monitor urine output and notify Licensed Independent Practitioner for values outside of normal range   Assess for signs of decreased cardiac output     Problem: Skin/Tissue Integrity - Adult  Goal: Skin integrity remains intact  10/5/2022 0945 by Eliu Zamora RN  Outcome: Progressing  10/4/2022 2300 by Alex Pryor RN  Outcome: Progressing  Flowsheets (Taken 10/4/2022 2300)  Skin Integrity Remains Intact:   Monitor for areas of redness and/or skin breakdown   Assess vascular access sites hourly     Problem: Infection - Adult  Goal: Absence of infection at discharge  10/5/2022 0945 by Eliu Zamora RN  Outcome: Progressing  10/4/2022 2300 by Alex Pryor RN  Outcome: Progressing  Flowsheets (Taken 10/4/2022 2300)  Absence of infection at discharge:   Assess and monitor for signs and symptoms of infection   Monitor lab/diagnostic results   Monitor all insertion sites i.e., indwelling lines, tubes and drains   Instruct and encourage patient and family to use good hand hygiene technique     Problem: Metabolic/Fluid and Electrolytes - Adult  Goal: Electrolytes maintained within normal limits  Outcome: Progressing     Problem: Hematologic - Adult  Goal: Maintains hematologic stability  Outcome: Progressing     Problem: Skin/Tissue Integrity  Goal: Absence of new skin breakdown  10/5/2022 0945 by Tha Her RN  Outcome: Progressing  10/4/2022 2300 by Dayami Camacho RN  Outcome: Progressing  Note: No new skin breakdown this shift.      Problem: Chronic Conditions and Co-morbidities  Goal: Patient's chronic conditions and co-morbidity symptoms are monitored and maintained or improved  10/5/2022 0945 by Tha Her RN  Outcome: Progressing  10/4/2022 2300 by Dayami Camacho RN  Outcome: Progressing  Flowsheets (Taken 10/4/2022 2300)  Care Plan - Patient's Chronic Conditions and Co-Morbidity Symptoms are Monitored and Maintained or Improved:   Monitor and assess patient's chronic conditions and comorbid symptoms for stability, deterioration, or improvement   Collaborate with multidisciplinary team to address chronic and comorbid conditions and prevent exacerbation or deterioration   Update acute care plan with appropriate goals if chronic or comorbid symptoms are exacerbated and prevent overall improvement and discharge     Problem: Nutrition Deficit:  Goal: Optimize nutritional status  10/5/2022 0945 by Tha Her RN  Outcome: Progressing  10/4/2022 2300 by Dayami Camacho RN  Outcome: Progressing  Flowsheets (Taken 10/4/2022 2300)  Nutrient intake appropriate for improving, restoring, or maintaining nutritional needs:   Assess nutritional status and recommend course of action   Monitor oral intake, labs, and treatment plans     Problem: Neurosensory - Adult  Goal: Achieves maximal functionality and self care  Outcome: Progressing

## 2022-10-05 NOTE — PROGRESS NOTES
[facility] PROGRESS NOTE      Patient: Korin Reed  Room #: 5J-20/185-K            YOB: 1965  Age: 56 y.o. Gender: male            Admit Date & Time: 9/28/2022  9:56 AM    Assessment:  Quintin Mills is in bed while the nurse is taking care of him on 4k. His wife is not in the room at this time. He talked about needing prayer due to the possibility of maybe having cancer. He seems concerned about this. Interventions: Active listening. I provided prayer, emotional support and words of comfort. The pt did not have any other concerns at this time    Outcomes: The patient was encouraged and didn't share any further spiritual needs at this time. The pt remains optimistic and hopeful. The pt shared that he was appreciative for the support. He is well acquainted with this . Plan:  [facility]plains will follow-up at a later time for assessment of any spiritual care needs present. 10/05/22 1330   Encounter Summary   Encounter Overview/Reason  Spiritual/Emotional Needs   Service Provided For: Patient   Referral/Consult From: [facility]   Support System Spouse   Last Encounter  10/05/22   Complexity of Encounter Moderate   Begin Time 1320   End Time  1331   Total Time Calculated 11 min   Encounter    Type Follow up   Spiritual/Emotional needs   Type Spiritual Support   Assessment/Intervention/Outcome   Assessment Calm;Coping   Intervention Nurtured Hope;Prayer (assurance of)/[place]; Active listening   Outcome Comfort;Coping         Electronically signed by Jovany Rivera on 10/5/2022 at 2:12 PM.  3 [facility]  [phone]

## 2022-10-05 NOTE — PLAN OF CARE
Problem: Discharge Planning  Goal: Discharge to home or other facility with appropriate resources  10/4/2022 2300 by Ramon Bai RN  Outcome: Progressing  Flowsheets (Taken 10/4/2022 2300)  Discharge to home or other facility with appropriate resources:   Identify barriers to discharge with patient and caregiver   Identify discharge learning needs (meds, wound care, etc)     Problem: Pain  Goal: Verbalizes/displays adequate comfort level or baseline comfort level  10/4/2022 2300 by Ramon Bai RN  Outcome: Progressing  Flowsheets (Taken 10/4/2022 2300)  Verbalizes/displays adequate comfort level or baseline comfort level:   Encourage patient to monitor pain and request assistance   Assess pain using appropriate pain scale   Administer analgesics based on type and severity of pain and evaluate response   Implement non-pharmacological measures as appropriate and evaluate response     Problem: Safety - Adult  Goal: Free from fall injury  10/4/2022 2300 by Ramon Bai RN  Outcome: Progressing  Flowsheets (Taken 10/4/2022 2300)  Free From Fall Injury: Instruct family/caregiver on patient safety  Note: Patient free from falls this shift. Problem: ABCDS Injury Assessment  Goal: Absence of physical injury  10/4/2022 2300 by Ramon Bai RN  Outcome: Progressing  Flowsheets (Taken 10/4/2022 2300)  Absence of Physical Injury: Implement safety measures based on patient assessment  Note: Bed alarm in use this shift.      Problem: Respiratory - Adult  Goal: Achieves optimal ventilation and oxygenation  10/4/2022 2300 by Ramon Bai RN  Outcome: Progressing  Flowsheets (Taken 10/4/2022 2300)  Achieves optimal ventilation and oxygenation:   Assess for changes in respiratory status   Assess for changes in mentation and behavior   Position to facilitate oxygenation and minimize respiratory effort   Oxygen supplementation based on oxygen saturation or arterial blood gases   Encourage broncho-pulmonary hygiene including cough, deep breathe, incentive spirometry   Assess and instruct to report shortness of breath or any respiratory difficulty     Problem: Cardiovascular - Adult  Goal: Maintains optimal cardiac output and hemodynamic stability  10/4/2022 2300 by Gloria Casillas RN  Outcome: Progressing  Flowsheets (Taken 10/4/2022 2300)  Maintains optimal cardiac output and hemodynamic stability:   Monitor blood pressure and heart rate   Monitor urine output and notify Licensed Independent Practitioner for values outside of normal range   Assess for signs of decreased cardiac output     Problem: Skin/Tissue Integrity - Adult  Goal: Skin integrity remains intact  10/4/2022 2300 by Gloria Casillas RN  Outcome: Progressing  Flowsheets (Taken 10/4/2022 2300)  Skin Integrity Remains Intact:   Monitor for areas of redness and/or skin breakdown   Assess vascular access sites hourly     Problem: Infection - Adult  Goal: Absence of infection at discharge  10/4/2022 2300 by Gloria Casillas RN  Outcome: Progressing  Flowsheets (Taken 10/4/2022 2300)  Absence of infection at discharge:   Assess and monitor for signs and symptoms of infection   Monitor lab/diagnostic results   Monitor all insertion sites i.e., indwelling lines, tubes and drains   Instruct and encourage patient and family to use good hand hygiene technique     Problem: Skin/Tissue Integrity  Goal: Absence of new skin breakdown  Description: 1. Monitor for areas of redness and/or skin breakdown  2. Assess vascular access sites hourly  3. Every 4-6 hours minimum:  Change oxygen saturation probe site  4. Every 4-6 hours:  If on nasal continuous positive airway pressure, respiratory therapy assess nares and determine need for appliance change or resting period. 10/4/2022 2300 by Gloria Casillas RN  Outcome: Progressing  Note: No new skin breakdown this shift.      Problem: Chronic Conditions and Co-morbidities  Goal: Patient's chronic conditions and co-morbidity symptoms are monitored and maintained or improved  10/4/2022 2300 by Alexandr Bronson RN  Outcome: Progressing  Flowsheets (Taken 10/4/2022 2300)  Care Plan - Patient's Chronic Conditions and Co-Morbidity Symptoms are Monitored and Maintained or Improved:   Monitor and assess patient's chronic conditions and comorbid symptoms for stability, deterioration, or improvement   Collaborate with multidisciplinary team to address chronic and comorbid conditions and prevent exacerbation or deterioration   Update acute care plan with appropriate goals if chronic or comorbid symptoms are exacerbated and prevent overall improvement and discharge     Problem: Nutrition Deficit:  Goal: Optimize nutritional status  10/4/2022 2300 by Alexandr Bronson RN  Outcome: Progressing  Flowsheets (Taken 10/4/2022 2300)  Nutrient intake appropriate for improving, restoring, or maintaining nutritional needs:   Assess nutritional status and recommend course of action   Monitor oral intake, labs, and treatment plans   Care plan reviewed with patient and family. Patient and family verbalize understanding of the plan of care and contribute to goal setting.

## 2022-10-05 NOTE — FLOWSHEET NOTE
10/05/22 1015   Safe Environment   Safety Measures Other (comment)  (Virtual nurse rounding complete)   Attempt made to reach patient via audio, no response. Camera turned on to check patient safety. Patient and bed currently not in room.

## 2022-10-05 NOTE — FLOWSHEET NOTE
10/05/22 1502   Safe Environment   Safety Measures Other (comment)  (Virtual nurse rounding complete)   Virtual nurse introduced via audio. Patient responded via audio. Denies needs at this time, states call light is within reach.

## 2022-10-05 NOTE — PROGRESS NOTES
[facility]   Nephrology progress note  10/5/2022, 10:36 AM      Pt Name:    Ktaie Leal  MRN:     [id number]     YOB: 1965  Admit Date:    9/28/2022  9:56 AM    Chief Complaint: Nephrology following for ESRD. Subjective:  Patient was seen and examined this morning on hemodialysis. Patient tolerating well, /66, UF 2 liters. Patient denies any complaints. Objective:  24HR INTAKE/OUTPUT:    Intake/Output Summary (Last 24 hours) at 10/5/2022 1036  Last data filed at 10/5/2022 0329  Gross per 24 hour   Intake 550 ml   Output 560 ml   Net -10 ml         I/O last 3 completed shifts: In: 65 [P.O.:840; I.V.:10]  Out: 770 [Chest Tube:770]  No intake/output data recorded.    Admission weight: 250 lb (113.4 kg)  Wt Readings from Last 3 Encounters:   10/05/22 208 lb 15.9 oz (94.8 kg)   09/13/21 250 lb (113.4 kg)   07/26/21 240 lb (108.9 kg)        Vitals :   Vitals:    10/04/22 2334 10/05/22 0327 10/05/22 0600 10/05/22 0745   BP: (!) 146/71 (!) 159/73 (!) 172/81 127/65   Pulse: 79 77 83 73   Resp: 16 16  14   Temp: 97.9 °F (36.6 °C) 97.3 °F (36.3 °C)  (!) 96.3 °F (35.7 °C)   TempSrc: Axillary Axillary     SpO2: 96% 95%     Weight:  213 lb 6.5 oz (96.8 kg)  208 lb 15.9 oz (94.8 kg)   Height:           Physical examination  General Appearance: alert and cooperative with exam, appears comfortable, no distress  Mouth/Throat: Oral mucosa moist  Neck: No JVD  Lungs: diminished, right sided chest tube  Heart:  S1, S2 heard  GI: soft, non-tender  Extremities: improved LE edema, left arm AV fistula    Medications:  Infusion:    sodium chloride       Meds:    sodium zirconium cyclosilicate  10 g Oral Once    doxycycline hyclate  100 mg Oral 2 times per day    dexamethasone  0.5 mg Oral Daily    docusate sodium  100 mg Oral BID    senna  1 tablet Oral Nightly    aspirin  81 mg Oral Daily    atorvastatin  40 mg Oral Nightly    cloNIDine  0.3 mg Oral TID    doxazosin  8 mg Oral Nightly famotidine  20 mg Oral Daily    ferrous sulfate  325 mg Oral Daily with breakfast    folic acid  5,943 mcg Oral Daily    fluticasone  1 spray Nasal Daily    isosorbide mononitrate  120 mg Oral Daily    sertraline  100 mg Oral Daily    verapamil  240 mg Oral Daily    multivitamin  1 tablet Oral Daily    sodium chloride flush  10 mL IntraVENous 2 times per day    heparin (porcine)  5,000 Units SubCUTAneous 3 times per day     Meds prn: hydrOXYzine HCl, traZODone, sodium chloride flush, sodium chloride, ondansetron **OR** ondansetron, polyethylene glycol, acetaminophen, HYDROcodone 5 mg - acetaminophen **OR** HYDROcodone 5 mg - acetaminophen, morphine     Lab Data :  CBC:   Recent Labs     10/03/22  0443 10/04/22  0336 10/05/22  0320   WBC 3.7* 3.2* 4.0*   HGB 10.2* 9.7* 9.8*   HCT 33.6* 32.4* 32.1*   PLT 92* 97* 101*     CMP:  Recent Labs     10/03/22  0443 10/03/22  1321 10/04/22  0336 10/05/22  0320   *  --  136 137   K 5.9* 4.5 5.0 5.7*   CL 97*  --  100 100   CO2 23  --  24 22*   BUN 73*  --  38* 62*   CREATININE 10.3*  --  7.7* 9.6*   GLUCOSE 105  --  71 77   CALCIUM 8.9  --  9.0 9.1   MG  --   --   --  2.5*   PHOS  --   --  5.4* 4.7     Hepatic:   No results for input(s): LABALBU, AST, ALT, ALB, BILITOT, ALKPHOS in the last 72 hours. Assessment and Plan:    1. ESRD on HD TTS  -dialyzing MWF while inpatient  -HD today, UF 2 liters    2. Hyperkalemia : HD on 2K bath, low potassium diet  3. Mild metabolic acidosis, should correct with HD  4. . Acute hypoxic/hypercapneic resp failure  5. Pulmonary edema, had extra HD on admission  6. Right pleural effusion s/p chest tube  7. Anemia in CKD  8. DM  9. Secondary hyperparathyroidism    D/W patient     Orland Cogan, DO  [facility]    This report has been created using voice recognition software.  It may contain minor errors which are inherent in voice recognition technology

## 2022-10-06 LAB
ACINETOBACTER CALCOACETICUS-BAUMANNII BY PCR: NOT DETECTED
ADENOVIRUS BY PCR: NOT DETECTED
ANION GAP SERPL CALCULATED.3IONS-SCNC: 12 MEQ/L (ref 8–16)
BUN BLDV-MCNC: 37 MG/DL (ref 7–22)
CALCIUM SERPL-MCNC: 9 MG/DL (ref 8.5–10.5)
CHLAMYDIA PNEUMONIAE BY PCR: NOT DETECTED
CHLORIDE BLD-SCNC: 101 MEQ/L (ref 98–111)
CO2: 23 MEQ/L (ref 23–33)
CREAT SERPL-MCNC: 6.9 MG/DL (ref 0.4–1.2)
ENTEROBACTER CLOACAE COMPLEX BY PCR: NOT DETECTED
ERYTHROCYTE [DISTWIDTH] IN BLOOD BY AUTOMATED COUNT: 12.9 % (ref 11.5–14.5)
ERYTHROCYTE [DISTWIDTH] IN BLOOD BY AUTOMATED COUNT: 47.8 FL (ref 35–45)
ESCHERICHIA COLI BY PCR: NOT DETECTED
GFR SERPL CREATININE-BSD FRML MDRD: 10 ML/MIN/1.73M2
GLUCOSE BLD-MCNC: 77 MG/DL (ref 70–108)
HAEMOPHILUS INFLUENZAE BY PCR: NOT DETECTED
HCT VFR BLD CALC: 34.2 % (ref 42–52)
HEMOGLOBIN: 10.5 GM/DL (ref 14–18)
INFLUENZA A BY PCR: NOT DETECTED
INFLUENZA B BY PCR: NOT DETECTED
KLEBSIELLA AEROGENES BY PCR: NOT DETECTED
KLEBSIELLA OXYTOCA BY PCR: NOT DETECTED
KLEBSIELLA PNEUMONIAE GROUP BY PCR: NOT DETECTED
LEGIONELLA PNEUMOPHILIA BY PCR: NOT DETECTED
MCH RBC QN AUTO: 31.4 PG (ref 26–33)
MCHC RBC AUTO-ENTMCNC: 30.7 GM/DL (ref 32.2–35.5)
MCV RBC AUTO: 102.4 FL (ref 80–94)
METAPNEUMOVIRUS BY PCR: NOT DETECTED
MORAXELLA CATARRHALIS BY PCR: NOT DETECTED
MYCOPLASMA PNEUMONIAE BY PCR: NOT DETECTED
NON-SARS CORONAVIRUS: NOT DETECTED
PARAINFLUENZA VIRUS BY PCR: NOT DETECTED
PLATELET # BLD: 108 THOU/MM3 (ref 130–400)
PMV BLD AUTO: 9.6 FL (ref 9.4–12.4)
POTASSIUM SERPL-SCNC: 4.7 MEQ/L (ref 3.5–5.2)
PROTEUS SPECIES BY PCR: NOT DETECTED
PSEUDOMONAS AERUGINOSA BY PCR: NOT DETECTED
RBC # BLD: 3.34 MILL/MM3 (ref 4.7–6.1)
RESISTANT GENE CTX-M BY PCR: NORMAL
RESISTANT GENE IMP BY PCR: NORMAL
RESISTANT GENE KPC BY PCR: NORMAL
RESISTANT GENE MECA/C & MREJ BY PCR: NORMAL
RESISTANT GENE NDM BY PCR: NORMAL
RESISTANT GENE OXA-48-LIKE BY PCR: NORMAL
RESISTANT GENE VIM BY PCR: NORMAL
RESPIRATORY SYNCYTIAL VIRUS BY PCR: NOT DETECTED
RHINOVIRUS ENTEROVIRUS PCR: NOT DETECTED
SERRATIA MARCESCENS BY PCR: NOT DETECTED
SODIUM BLD-SCNC: 136 MEQ/L (ref 135–145)
SOURCE: NORMAL
SPECIMEN ACCEPTABILITY: NORMAL
STAPH AUREUS BY PCR: NOT DETECTED
STREP AGALACTIAE BY PCR: NOT DETECTED
STREP PNEUMONIAE BY PCR: NOT DETECTED
STREP PYOGENES BY PCR: NOT DETECTED
WBC # BLD: 3.7 THOU/MM3 (ref 4.8–10.8)

## 2022-10-06 PROCEDURE — 6360000002 HC RX W HCPCS: Performed by: INTERNAL MEDICINE

## 2022-10-06 PROCEDURE — 87486 CHLMYD PNEUM DNA AMP PROBE: CPT

## 2022-10-06 PROCEDURE — 6370000000 HC RX 637 (ALT 250 FOR IP): Performed by: INTERNAL MEDICINE

## 2022-10-06 PROCEDURE — 6370000000 HC RX 637 (ALT 250 FOR IP): Performed by: STUDENT IN AN ORGANIZED HEALTH CARE EDUCATION/TRAINING PROGRAM

## 2022-10-06 PROCEDURE — 87798 DETECT AGENT NOS DNA AMP: CPT

## 2022-10-06 PROCEDURE — 6370000000 HC RX 637 (ALT 250 FOR IP)

## 2022-10-06 PROCEDURE — 0B9F8ZX DRAINAGE OF RIGHT LOWER LUNG LOBE, VIA NATURAL OR ARTIFICIAL OPENING ENDOSCOPIC, DIAGNOSTIC: ICD-10-PCS | Performed by: INTERNAL MEDICINE

## 2022-10-06 PROCEDURE — 87581 M.PNEUMON DNA AMP PROBE: CPT

## 2022-10-06 PROCEDURE — 87541 LEGION PNEUMO DNA AMP PROB: CPT

## 2022-10-06 PROCEDURE — 99232 SBSQ HOSP IP/OBS MODERATE 35: CPT | Performed by: INTERNAL MEDICINE

## 2022-10-06 PROCEDURE — 6360000002 HC RX W HCPCS: Performed by: PHYSICIAN ASSISTANT

## 2022-10-06 PROCEDURE — 6370000000 HC RX 637 (ALT 250 FOR IP): Performed by: PHYSICIAN ASSISTANT

## 2022-10-06 PROCEDURE — 36415 COLL VENOUS BLD VENIPUNCTURE: CPT

## 2022-10-06 PROCEDURE — 87186 SC STD MICRODIL/AGAR DIL: CPT

## 2022-10-06 PROCEDURE — 2500000003 HC RX 250 WO HCPCS: Performed by: INTERNAL MEDICINE

## 2022-10-06 PROCEDURE — 87070 CULTURE OTHR SPECIMN AEROBIC: CPT

## 2022-10-06 PROCEDURE — 99152 MOD SED SAME PHYS/QHP 5/>YRS: CPT | Performed by: INTERNAL MEDICINE

## 2022-10-06 PROCEDURE — 87631 RESP VIRUS 3-5 TARGETS: CPT

## 2022-10-06 PROCEDURE — 87077 CULTURE AEROBIC IDENTIFY: CPT

## 2022-10-06 PROCEDURE — 31624 DX BRONCHOSCOPE/LAVAGE: CPT | Performed by: INTERNAL MEDICINE

## 2022-10-06 PROCEDURE — 2700000000 HC OXYGEN THERAPY PER DAY

## 2022-10-06 PROCEDURE — 2580000003 HC RX 258: Performed by: PHYSICIAN ASSISTANT

## 2022-10-06 PROCEDURE — 99233 SBSQ HOSP IP/OBS HIGH 50: CPT | Performed by: INTERNAL MEDICINE

## 2022-10-06 PROCEDURE — 80048 BASIC METABOLIC PNL TOTAL CA: CPT

## 2022-10-06 PROCEDURE — 3609027000 HC BRONCHOSCOPY: Performed by: INTERNAL MEDICINE

## 2022-10-06 PROCEDURE — 87205 SMEAR GRAM STAIN: CPT

## 2022-10-06 PROCEDURE — 6370000000 HC RX 637 (ALT 250 FOR IP): Performed by: NURSE PRACTITIONER

## 2022-10-06 PROCEDURE — 2060000000 HC ICU INTERMEDIATE R&B

## 2022-10-06 PROCEDURE — 85027 COMPLETE CBC AUTOMATED: CPT

## 2022-10-06 RX ORDER — KETAMINE HCL IN NACL, ISO-OSM 100MG/10ML
100 SYRINGE (ML) INJECTION ONCE
Status: DISCONTINUED | OUTPATIENT
Start: 2022-10-06 | End: 2022-10-15 | Stop reason: HOSPADM

## 2022-10-06 RX ORDER — MIDAZOLAM HYDROCHLORIDE 1 MG/ML
INJECTION INTRAMUSCULAR; INTRAVENOUS PRN
Status: DISCONTINUED | OUTPATIENT
Start: 2022-10-06 | End: 2022-10-06 | Stop reason: ALTCHOICE

## 2022-10-06 RX ORDER — MIDAZOLAM HYDROCHLORIDE 1 MG/ML
INJECTION INTRAMUSCULAR; INTRAVENOUS
Status: DISPENSED
Start: 2022-10-06 | End: 2022-10-06

## 2022-10-06 RX ORDER — MINOXIDIL 2.5 MG/1
5 TABLET ORAL 2 TIMES DAILY
Status: DISCONTINUED | OUTPATIENT
Start: 2022-10-06 | End: 2022-10-07

## 2022-10-06 RX ORDER — MINOXIDIL 2.5 MG/1
15 TABLET ORAL 2 TIMES DAILY
Status: ON HOLD | COMMUNITY
End: 2022-10-15 | Stop reason: HOSPADM

## 2022-10-06 RX ORDER — KETAMINE HYDROCHLORIDE 50 MG/ML
INJECTION, SOLUTION, CONCENTRATE INTRAMUSCULAR; INTRAVENOUS PRN
Status: DISCONTINUED | OUTPATIENT
Start: 2022-10-06 | End: 2022-10-06 | Stop reason: ALTCHOICE

## 2022-10-06 RX ADMIN — DOXYCYCLINE HYCLATE 100 MG: 100 TABLET, COATED ORAL at 20:57

## 2022-10-06 RX ADMIN — FLUTICASONE PROPIONATE 1 SPRAY: 50 SPRAY, METERED NASAL at 11:41

## 2022-10-06 RX ADMIN — HEPARIN SODIUM 5000 UNITS: 5000 INJECTION INTRAVENOUS; SUBCUTANEOUS at 16:28

## 2022-10-06 RX ADMIN — SENNOSIDES 8.6 MG: 8.6 TABLET, COATED ORAL at 20:14

## 2022-10-06 RX ADMIN — HYDROCODONE BITARTRATE AND ACETAMINOPHEN 1 TABLET: 5; 325 TABLET ORAL at 11:46

## 2022-10-06 RX ADMIN — FERROUS SULFATE TAB 325 MG (65 MG ELEMENTAL FE) 325 MG: 325 (65 FE) TAB at 11:41

## 2022-10-06 RX ADMIN — DOXAZOSIN 8 MG: 4 TABLET ORAL at 20:57

## 2022-10-06 RX ADMIN — MINOXIDIL 5 MG: 2.5 TABLET ORAL at 20:14

## 2022-10-06 RX ADMIN — VERAPAMIL HYDROCHLORIDE 240 MG: 240 TABLET, FILM COATED, EXTENDED RELEASE ORAL at 11:48

## 2022-10-06 RX ADMIN — FAMOTIDINE 20 MG: 20 TABLET ORAL at 11:39

## 2022-10-06 RX ADMIN — ATORVASTATIN CALCIUM 40 MG: 40 TABLET, FILM COATED ORAL at 20:15

## 2022-10-06 RX ADMIN — SERTRALINE 100 MG: 100 TABLET, FILM COATED ORAL at 11:41

## 2022-10-06 RX ADMIN — ISOSORBIDE MONONITRATE 120 MG: 60 TABLET, EXTENDED RELEASE ORAL at 11:41

## 2022-10-06 RX ADMIN — DOCUSATE SODIUM 100 MG: 100 CAPSULE, LIQUID FILLED ORAL at 20:14

## 2022-10-06 RX ADMIN — CLONIDINE HYDROCHLORIDE 0.3 MG: 0.2 TABLET ORAL at 20:57

## 2022-10-06 RX ADMIN — HEPARIN SODIUM 5000 UNITS: 5000 INJECTION INTRAVENOUS; SUBCUTANEOUS at 20:44

## 2022-10-06 RX ADMIN — DEXAMETHASONE 0.5 MG: 0.5 TABLET ORAL at 11:40

## 2022-10-06 RX ADMIN — DOCUSATE SODIUM 100 MG: 100 CAPSULE, LIQUID FILLED ORAL at 11:40

## 2022-10-06 RX ADMIN — SODIUM CHLORIDE, PRESERVATIVE FREE 10 ML: 5 INJECTION INTRAVENOUS at 20:14

## 2022-10-06 RX ADMIN — MINOXIDIL 5 MG: 2.5 TABLET ORAL at 18:02

## 2022-10-06 RX ADMIN — Medication 1 TABLET: at 11:40

## 2022-10-06 RX ADMIN — ASPIRIN 81 MG: 81 TABLET, COATED ORAL at 11:40

## 2022-10-06 RX ADMIN — FOLIC ACID 1000 MCG: 1 TABLET ORAL at 11:39

## 2022-10-06 RX ADMIN — DOXYCYCLINE HYCLATE 100 MG: 100 TABLET, COATED ORAL at 11:40

## 2022-10-06 RX ADMIN — CLONIDINE HYDROCHLORIDE 0.3 MG: 0.2 TABLET ORAL at 11:40

## 2022-10-06 RX ADMIN — CLONIDINE HYDROCHLORIDE 0.3 MG: 0.2 TABLET ORAL at 16:28

## 2022-10-06 ASSESSMENT — PAIN SCALES - GENERAL
PAINLEVEL_OUTOF10: 0
PAINLEVEL_OUTOF10: 0
PAINLEVEL_OUTOF10: 3
PAINLEVEL_OUTOF10: 0
PAINLEVEL_OUTOF10: 0

## 2022-10-06 ASSESSMENT — ENCOUNTER SYMPTOMS
NAUSEA: 0
BACK PAIN: 0
PHOTOPHOBIA: 0
ABDOMINAL PAIN: 0
COLOR CHANGE: 0
SHORTNESS OF BREATH: 0
TROUBLE SWALLOWING: 0
VOMITING: 0

## 2022-10-06 ASSESSMENT — PAIN - FUNCTIONAL ASSESSMENT: PAIN_FUNCTIONAL_ASSESSMENT: NONE - DENIES PAIN

## 2022-10-06 NOTE — PLAN OF CARE
Problem: Respiratory - Adult  Goal: Achieves optimal ventilation and oxygenation  10/6/2022 0423 by Marly Chanel RN  Flowsheets (Taken 10/5/2022 2000)  Achieves optimal ventilation and oxygenation:   Assess for changes in respiratory status   Assess for changes in mentation and behavior   Position to facilitate oxygenation and minimize respiratory effort   Oxygen supplementation based on oxygen saturation or arterial blood gases   Assess and instruct to report shortness of breath or any respiratory difficulty   Respiratory therapy support as indicated  10/6/2022 0149 by Erich Ordonez RCP  Outcome: Progressing  Flowsheets (Taken 10/5/2022 2000 by Marly Chanel RN)  Achieves optimal ventilation and oxygenation:   Assess for changes in respiratory status   Assess for changes in mentation and behavior   Position to facilitate oxygenation and minimize respiratory effort   Oxygen supplementation based on oxygen saturation or arterial blood gases   Assess and instruct to report shortness of breath or any respiratory difficulty   Respiratory therapy support as indicated     Problem: Discharge Planning  Goal: Discharge to home or other facility with appropriate resources  Flowsheets (Taken 10/5/2022 2000)  Discharge to home or other facility with appropriate resources:   Identify barriers to discharge with patient and caregiver   Identify discharge learning needs (meds, wound care, etc)   Arrange for interpreters to assist at discharge as needed     Problem: Pain  Goal: Verbalizes/displays adequate comfort level or baseline comfort level  Flowsheets (Taken 10/5/2022 2000)  Verbalizes/displays adequate comfort level or baseline comfort level:   Encourage patient to monitor pain and request assistance   Assess pain using appropriate pain scale   Administer analgesics based on type and severity of pain and evaluate response   Implement non-pharmacological measures as appropriate and evaluate response   Notify Licensed Independent Practitioner if interventions unsuccessful or patient reports new pain     Problem: Safety - Adult  Goal: Free from fall injury  Flowsheets (Taken 10/4/2022 2300 by Symone Benavidez RN)  Free From Fall Injury: Instruct family/caregiver on patient safety     Problem: ABCDS Injury Assessment  Goal: Absence of physical injury  Flowsheets (Taken 10/4/2022 2300 by Symone Benavidez RN)  Absence of Physical Injury: Implement safety measures based on patient assessment     Problem: Cardiovascular - Adult  Goal: Maintains optimal cardiac output and hemodynamic stability  Flowsheets (Taken 10/5/2022 2000)  Maintains optimal cardiac output and hemodynamic stability:   Monitor blood pressure and heart rate   Monitor urine output and notify Licensed Independent Practitioner for values outside of normal range   Assess for signs of decreased cardiac output   Administer fluid and/or volume expanders as ordered  Goal: Absence of cardiac dysrhythmias or at baseline  Recent Flowsheet Documentation  Taken 10/5/2022 2000 by Scot Mcdaniel RN  Absence of cardiac dysrhythmias or at baseline:   Monitor cardiac rate and rhythm   Assess for signs of decreased cardiac output     Problem: Skin/Tissue Integrity - Adult  Goal: Skin integrity remains intact  Flowsheets (Taken 10/5/2022 2000)  Skin Integrity Remains Intact: Monitor for areas of redness and/or skin breakdown     Problem: Infection - Adult  Goal: Absence of infection at discharge  Flowsheets (Taken 10/5/2022 2000)  Absence of infection at discharge:   Assess and monitor for signs and symptoms of infection   Monitor lab/diagnostic results   Monitor all insertion sites i.e., indwelling lines, tubes and drains   [place] appropriate cooling/warming therapies per order   Administer medications as ordered   Instruct and encourage patient and family to use good hand hygiene technique  Goal: Absence of infection during hospitalization  Recent Flowsheet Documentation  Taken 10/5/2022 2000 by Catie Ruano RN  Absence of infection during hospitalization:   Assess and monitor for signs and symptoms of infection   Monitor lab/diagnostic results   Monitor all insertion sites i.e., indwelling lines, tubes and drains   [place] appropriate cooling/warming therapies per order   Administer medications as ordered   Instruct and encourage patient and family to use good hand hygiene technique     Problem: Metabolic/Fluid and Electrolytes - Adult  Goal: Electrolytes maintained within normal limits  Flowsheets (Taken 10/5/2022 2000)  Electrolytes maintained within normal limits:   Monitor labs and assess patient for signs and symptoms of electrolyte imbalances   Administer electrolyte replacement as ordered   Monitor response to electrolyte replacements, including repeat lab results as appropriate     Problem: Hematologic - Adult  Goal: Maintains hematologic stability  Flowsheets (Taken 10/5/2022 2000)  Maintains hematologic stability:   Assess for signs and symptoms of bleeding or hemorrhage   Monitor labs for bleeding or clotting disorders     Problem: Skin/Tissue Integrity  Goal: Absence of new skin breakdown  Description: 1. Monitor for areas of redness and/or skin breakdown  2. Assess vascular access sites hourly  3. Every 4-6 hours minimum:  Change oxygen saturation probe site  4. Every 4-6 hours:  If on nasal continuous positive airway pressure, respiratory therapy assess nares and determine need for appliance change or resting period.   Note: No new skin breakdown       Problem: Chronic Conditions and Co-morbidities  Goal: Patient's chronic conditions and co-morbidity symptoms are monitored and maintained or improved  Flowsheets (Taken 10/5/2022 2000)  Care Plan - Patient's Chronic Conditions and Co-Morbidity Symptoms are Monitored and Maintained or Improved:   Monitor and assess patient's chronic conditions and comorbid symptoms for stability, deterioration, or improvement   Collaborate with multidisciplinary team to address chronic and comorbid conditions and prevent exacerbation or deterioration   Update acute care plan with appropriate goals if chronic or comorbid symptoms are exacerbated and prevent overall improvement and discharge     Problem: Nutrition Deficit:  Goal: Optimize nutritional status  Flowsheets (Taken 10/4/2022 2300 by Yamileth Triplett RN)  Nutrient intake appropriate for improving, restoring, or maintaining nutritional needs:   Assess nutritional status and recommend course of action   Monitor oral intake, labs, and treatment plans     Problem: Neurosensory - Adult  Goal: Achieves maximal functionality and self care  Flowsheets (Taken 10/5/2022 2000)  Achieves maximal functionality and self care: Encourage and assist patient to increase activity and self care with guidance from physical therapy/occupational therapy

## 2022-10-06 NOTE — FLOWSHEET NOTE
10/06/22 1004   Safe Environment   Safety Measures Other (comment)  (VN rounding complete)   Pt interactive with audio , replies hes doing ok , denied any needs or concerns at this time

## 2022-10-06 NOTE — PROGRESS NOTES
Patient in endo for bronchoscopy. Scope  used. Pictures taken. BAL completed. Specimens labeled and sent to lab. No biopsies taken. jars labeled and sent to lab. Procedure completed. Patient tolerated well.

## 2022-10-06 NOTE — PROGRESS NOTES
PULMONARY PROGRESS NOTE      Patient:  Park Orr    Unit/Bed:4K-14/014-A  YOB: 1965  MRN: [id number]   PCP: Shimon Perez MD  Date of Admission: 9/28/2022  Chief Complaint:-Acute Hypoxic respiratory failure     Assessment and Plan:       Acute hypoxic respiratory failure: Weaned to 3 L nasal cannula. Improved with chest tube placement. Chest tube to remain in place. Placed back to suction 10/3. Patient underwent chemical pneumolysis with tPA and dornase injection 10/4. Right pleural effusion: chest tube placed 9/28. Place back to suction. Chemical pneumolysis with tPA and dornase injection completed on 10/4. Resulted in 540 cc of output. Lung remains entrapped. Right entrapped lung: Plans for chemical pneumolysis 10/4. Maintain chest tube in place. Cytology negative for malignant cells x 3. Added 30 days of doxycycline 10/3. Plans for bronchoscopy 10/6. Consulted dr. Madan Fernandez 09/1, plans for decortication and biopsy Monday  Right sided pulmonary consolidations: Associated with exudative pleural effusion. Doxycycline added. Will treat for 30 days. May benefit from low-dose Decadron to help attenuate consolidated process. History of thoracic and aortic aneurysm: status post stent graft 7/2018. Diabetes mellitus: Diet controlled  Hyperparathyroidism: Secondary to renal failure  Depression: Zoloft  GERD: Pepcid  History of substance abuse: History of cocaine use  Tobacco abuse:  every day smoker, recommend smoking cessation  Hyperlipidemia: Lipitor  Hypertension: Imdur, Cardura, Catapres. INITIAL H AND P AND ICU COURSE:  Murali Walsh is a 44-year-old black male who presented to [facility] on 9/28/2022 with worsening shortness of breath. He has a past medical history of current every day smoker, AAA, stage renal disease, cocaine abuse, depression, diabetes mellitus, hyperlipidemia, hypertension, medical noncompliance, PTSD.   Per report patient presented to the emergency department on 9/28 with worsening shortness of breath. Chest x-ray showed large right pleural effusion. Patient was placed on BiPAP. He was admitted to stepdown where he underwent a chest tube placement on 9/28 with 2 L of serosanguineous fluid. Dialysis was also performed on 9/28. He underwent albumin administration on 9/29. Repeat dialysis on 9/30. Chest tube drainage was noted for exudative effusion. 10/4 chest tube instilled with dornase and tPA. X-ray for tomorrow. Past Medical History: per HPI  Family History: Mother: Cancer  Social History: Current every day smoker, denies alcohol and drug use. Review of Systems   Constitutional:  Negative for fatigue and fever. HENT:  Negative for trouble swallowing. Eyes:  Negative for photophobia. Respiratory:  Negative for shortness of breath. Cardiovascular:  Negative for chest pain and leg swelling. Gastrointestinal:  Negative for abdominal pain, nausea and vomiting. Endocrine: Negative for polydipsia and polyphagia. Genitourinary:  Positive for decreased urine volume. Negative for flank pain. Musculoskeletal:  Negative for back pain. Skin:  Negative for color change and pallor. Allergic/Immunologic: Negative for immunocompromised state. Neurological:  Negative for weakness and headaches. Hematological:  Negative for adenopathy. Psychiatric/Behavioral:  Negative for agitation and confusion. The patient is nervous/anxious.         Scheduled Meds:   ketamine  100 mg IntraVENous Once    midazolam        doxycycline hyclate  100 mg Oral 2 times per day    dexamethasone  0.5 mg Oral Daily    docusate sodium  100 mg Oral BID    senna  1 tablet Oral Nightly    aspirin  81 mg Oral Daily    atorvastatin  40 mg Oral Nightly    cloNIDine  0.3 mg Oral TID    doxazosin  8 mg Oral Nightly    famotidine  20 mg Oral Daily    ferrous sulfate  325 mg Oral Daily with breakfast    folic acid  7,092 mcg Oral Daily    fluticasone  1 spray Nasal Daily    isosorbide mononitrate  120 mg Oral Daily    sertraline  100 mg Oral Daily    verapamil  240 mg Oral Daily    multivitamin  1 tablet Oral Daily    sodium chloride flush  10 mL IntraVENous 2 times per day    heparin (porcine)  5,000 Units SubCUTAneous 3 times per day     Continuous Infusions:   sodium chloride         PHYSICAL EXAMINATION:  T:  98.3.  P:  85. RR:  16. B/P:  169/70. FiO2:  4. O2 Sat:  95.  I/O:  640/2400  Body mass index is 25.87 kg/m². GCS:  15  General: Acute on chronically ill-appearing black male  HEENT:  normocephalic and atraumatic. No scleral icterus. PERR  Neck: supple. No Thyromegaly. Lungs: clear to auscultation. No retractions  Cardiac: RRR. No JVD. Abdomen: soft. Nontender. Extremities:  No clubbing, cyanosis, or edema x 4. Vasculature: capillary refill < 3 seconds. Palpable dorsalis pedis pulses. Fistula left  Skin:  warm and dry. Psych:  Alert and oriented x3. Affect appropriate  Lymph:  No supraclavicular adenopathy. Neurologic:  No focal deficit. No seizures. Data: (All radiographs, tracings, PFTs, and imaging are personally viewed and interpreted unless otherwise noted). Sodium 136, potassium 4.7, chloride 101, CO2 23, BUN 37, creatinine 6.9, anion gap 12.0, glucose 77  WBC 3.7, hemoglobin 10.5, hematocrit 34.2, platelet count 163  Telemetry shows normal sinus rhythm  Small pneumothorax on the right lung base. Small right pleural effusion is decreased. Right lobe consolidation concerning for atelectasis or pneumonia        Case and plan discussed with Dr. Flaquita Springer. Electronically signed by Abigail Kwon. WADE العراقي - CNP  CRITICAL CARE SPECIALIST   Patient seen by me including key components of medical care. Case discussed with NP. Case discussed with Dr Rui Bush.  Patient will need decortication and lung biopsy Monday. Will ask for dialysis in early morning prior to surgery.   Italicized font, if present,  represents changes to the note made by me.  CC time 35 minutes. Time was discontiguous. Time does not include procedure. Time does include my direct assessment of the patient and coordination of care. Time represents more than 50% of the time involved with patient care by the [address] [place] [address] team.  Electronically signed by Halie Miller.  Isaias Savage MD.

## 2022-10-06 NOTE — PLAN OF CARE
Problem: Respiratory - Adult  Goal: Achieves optimal ventilation and oxygenation  Outcome: Progressing  Flowsheets (Taken 10/5/2022 2000 by Casey Haque RN)  Achieves optimal ventilation and oxygenation:   Assess for changes in respiratory status   Assess for changes in mentation and behavior   Position to facilitate oxygenation and minimize respiratory effort   Oxygen supplementation based on oxygen saturation or arterial blood gases   Assess and instruct to report shortness of breath or any respiratory difficulty   Respiratory therapy support as indicated   Patient mutually agreed on goals.

## 2022-10-06 NOTE — PROGRESS NOTES
decortication and partial lung resection on 10/10/2022     ESRD, on hemodialysis  - Patient on MWF dialysis schedule  - Continue with low phosphorus and low potassium diet  - Nephrology following  - Judicious IV fluids and diuresis per nephrology recommendations  - Strict I's and O's  - 10/05 dialysis with 2 L fluid removal    Systolic heart failure with preserved ejection fraction  - Echo 9/29 increased left ventricular wall thickness with EF 60%  - Strict I's and O's, daily weights  - Judicious IV fluids and diuresis per nephrology  - Continue Imdur    Hypertrophic cardiomyopathy  - Echo 9/29 showing severe left ventricular concentric hypertrophy as well as dilation of the left atrium  - Continue aspirin, statin, isosorbide mononitrate, verapamil  - Strict I's and O's, daily weights  - Judicious IV fluids and diuresis per nephrology    Pneumothorax, right lower lobe  - Noted on CXR on 10/04  - Chest tube in place  - Advance care recommendations per pulmonology  -Continue to monitor for signs or symptoms of worsening respiratory function    Primary HTN  - Continue clonidine, doxazosin, and verapamil with parameters    PTSD  - Continue sertraline, Atarax, trazodone  - Mood stable    Unspecified depressive disorder  - Continue sertraline  - Mood stable    GERD  - Continue PPI    Polysubstance abuse  - Patient counseled on substance abuse cessation    HLD  - Continue atorvastatin    Anemia of chronic disease  - Trend CBC  - Continue iron and folate supplementation    REZA  - Patient tolerating BiPAP at night  - Recommendation for outpatient follow-up for PAP titration with sleep clinic    Thoracic spondylosis  - Noted on CTA of the chest on 10/02  - PT/OT consulted; 10/05 [facility]    Hyperkalemia, resolved     Hyperphosphatemia, resolved  - Currently on MWF dialysis schedule  - Continue low phosphorus diet  - Patient's home phosphorus binder resumed  - Trend phosphorus and BMP     Apical pneumothorax, right, resolved  Hx Thoracic Aneurysm  Hx AAA s/p aortic stent graft 7/2018  Hx COVID-19 pneumonia  Hx atrial flutter  Hx peripheral vascular disease  Hx hypertensive emergency  - Noted      Expected discharge date: 10/11/2022    Disposition:   [x] Home  [] TCU  [] Rehab  [] Psych  [] SNF  [] [place]  [] Other-    ===================================================================      Chief Complaint: Shortness of breath    Hospital Course: This is a 56-year-old -American male who presented to [address] [place] [address] on 09/28/2022 with worsening shortness of breath at rest and with exertion. CXR showed large right pleural effusion and patient required PAP therapy for adequate oxygenation. Patient was admitted and chest tube placed on 09/28 with 2 L of serosanguineous fluid drainage. Dialysis also performed at 09/28 with 2 L of fluid. Repeat dialysis performed on 09/29 with albumin administration. Repeat dialysis performed on 09/30/2022. Pleural fluid analysis was consistent with exudative effusion with negative cytology. Repeat CTA of the chest showed improvement in the right-sided pleural effusion, but showed evidence of a right apical pneumothorax which resolved on subsequent imaging. Repeat dialysis performed on 10/03 with 2 L removed. Dornase and alteplase administered through chest tube on 10/04 by intensivist.  Hemodialysis performed on 10/05 with 2 L of fluid removed. Bronchoscopy performed on 74/46 without complication. There was evidence of chronic inflammation with pitting airway disease with no mass identified. Subjective (past 24 hours):   Patient seen and evaluated at the bedside after bronchoscopy. He denies chest pain, fever, chills, nausea, vomiting, and diarrhea. He denies any acute symptoms or concerns at this time. 270 cc drainage from chest tube, serosanguineous in nature. ROS: reviewed complete ROS unchanged unless otherwise stated in hospital course/subjective portion. Medications:  Reviewed    Infusion Medications    sodium chloride       Scheduled Medications    ketamine  100 mg IntraVENous Once    midazolam        doxycycline hyclate  100 mg Oral 2 times per day    dexamethasone  0.5 mg Oral Daily    docusate sodium  100 mg Oral BID    senna  1 tablet Oral Nightly    aspirin  81 mg Oral Daily    atorvastatin  40 mg Oral Nightly    cloNIDine  0.3 mg Oral TID    doxazosin  8 mg Oral Nightly    famotidine  20 mg Oral Daily    ferrous sulfate  325 mg Oral Daily with breakfast    folic acid  5,134 mcg Oral Daily    fluticasone  1 spray Nasal Daily    isosorbide mononitrate  120 mg Oral Daily    sertraline  100 mg Oral Daily    verapamil  240 mg Oral Daily    multivitamin  1 tablet Oral Daily    sodium chloride flush  10 mL IntraVENous 2 times per day    heparin (porcine)  5,000 Units SubCUTAneous 3 times per day     PRN Meds: hydrOXYzine HCl, traZODone, sodium chloride flush, sodium chloride, ondansetron **OR** ondansetron, polyethylene glycol, acetaminophen, HYDROcodone 5 mg - acetaminophen **OR** HYDROcodone 5 mg - acetaminophen, morphine        Intake/Output Summary (Last 24 hours) at 10/6/2022 1148  Last data filed at 10/6/2022 0700  Gross per 24 hour   Intake 400 ml   Output 2670 ml   Net -2270 ml       Exam:  BP (!) 171/88   Pulse 93   Temp 98.8 °F (37.1 °C) (Oral)   Resp 18   Ht 6' 3\" (1.905 m)   Wt 207 lb (93.9 kg)   SpO2 97%   BMI 25.87 kg/m²     General: No distress, appears stated age. Eyes:  PERRL. Conjunctivae/corneas clear. HENT: Head normal appearing. Nares normal. Oral mucosa moist.  Hearing intact. Neck: Supple, with full range of motion. Trachea midline. No gross JVD appreciated. Respiratory: Normal respiratory effort. Bilateral crackles heard in the lower lobes. No wheezes or rhonchi. Chest tube in place draining serosanguineous fluid. Cardiovascular: Normal rate, regular rhythm with normal S1/S2 without murmurs. No lower extremity edema. Abdomen: Soft, non-tender, non-distended with normal bowel sounds. Musculoskeletal: No joint swelling or tenderness. Normal tone. No abnormal movements. Skin: Warm and dry. No rashes or lesions. Neurologic:  No focal sensory/motor deficits in the upper or lower extremities. Cranial nerves:  grossly non-focal 2-12. Psychiatric: Alert and oriented, normal insight and thought content. Capillary Refill: Brisk,< 3 seconds. Peripheral Pulses: +2 palpable, equal bilaterally. Labs:   Recent Labs     10/04/22  0336 10/05/22  0320 10/06/22  0415   WBC 3.2* 4.0* 3.7*   HGB 9.7* 9.8* 10.5*   HCT 32.4* 32.1* 34.2*   PLT 97* 101* 108*     Recent Labs     10/04/22  0336 10/05/22  0320 10/05/22  1401 10/06/22  0415    137  --  136   K 5.0 5.7* 4.3 4.7    100  --  101   CO2 24 22*  --  23   BUN 38* 62*  --  37*   CREATININE 7.7* 9.6*  --  6.9*   CALCIUM 9.0 9.1  --  9.0   PHOS 5.4* 4.7  --   --      No results for input(s): AST, ALT, BILIDIR, BILITOT, ALKPHOS in the last 72 hours. No results for input(s): INR in the last 72 hours. No results for input(s): Eitan Gubler in the last 72 hours. No results for input(s): PROCAL in the last 72 hours. Lab Results   Component Value Date/Time    NITRU NEGATIVE 04/03/2018 07:45 PM    WBCUA 2-4 04/03/2018 07:45 PM    BACTERIA NONE 04/03/2018 07:45 PM    RBCUA 5-10 04/03/2018 07:45 PM    BLOODU SMALL 04/03/2018 07:45 PM    SPECGRAV 1.014 03/07/2018 09:10 PM    GLUCOSEU NEGATIVE 04/03/2018 07:45 PM       Radiology (48 hours):  XR CHEST PORTABLE    Result Date: 10/5/2022  Small pneumothorax at the right lung base. Chest tube tip is at the right lung base, grossly unchanged. Small right pleural effusion, decreased. Right lung base consolidation, increased, suspicious for atelectasis or pneumonia. Cardiomegaly.  This document has been electronically signed by: Smith Rene MD on 10/05/2022 03:41 AM        DVT prophylaxis:    [] Lovenox  [] SCDs  [x] SQ Heparin  [] Encourage ambulation   [] Already on Anticoagulation       Diet: ADULT DIET; Regular; 5 carb choices (75 gm/meal); Low Fat/Low Chol/High Fiber/2 gm Na; Low Sodium (2 gm); Low Potassium (Less than 3000 mg/day);  Low Phosphorus (Less than 1000 mg)  ADULT ORAL NUTRITION SUPPLEMENT; Breakfast, Lunch; Renal Oral Supplement  Code Status: Full Code  PT/OT: Consulted      Electronically signed by Kannan Ojeda MD on 10/6/2022 at 11:48 AM    Case was discussed with Attending, Dr. Thogn Reid

## 2022-10-06 NOTE — CONSULTS
[facility]   Nephrology progress note  10/6/2022, 1:06 PM      Pt Name:    Sofi Eldridge  MRN:     [id number]     [place]:    1965  Admit Date:    9/28/2022  9:56 AM    Chief Complaint: Nephrology following for NURIS    Subjective:  Patient is in room sitting on bed surrounded by family. Is currently eating lunch. Has no complaints. Says no lingering pain from bronch earlier today. Complains of no shortness of breath. States he can now breathe easier with no pain at chest tube site. Remains well diuresed. Had 2400 removed via HD on 10/5. Lytes are WNL. Patient remains hypertensive. He was restarted on minoxidil 5 mg twice daily today. Objective:  24HR INTAKE/OUTPUT:    Intake/Output Summary (Last 24 hours) at 10/6/2022 1306  Last data filed at 10/6/2022 1248  Gross per 24 hour   Intake 480 ml   Output 270 ml   Net 210 ml         I/O last 3 completed shifts: In: 640 [P.O.:240]  Out: 2960 [Chest Tube:560]  I/O this shift: In: 480 [P.O.:480]  Out: -    Admission weight: 250 lb (113.4 kg)  Wt Readings from Last 3 Encounters:   10/06/22 207 lb (93.9 kg)   09/13/21 250 lb (113.4 kg)   07/26/21 240 lb (108.9 kg)        Vitals :   Vitals:    10/06/22 0807 10/06/22 0900 10/06/22 1131 10/06/22 1216   BP: (!) 180/85 (!) 169/90 (!) 171/88    Pulse: 80 85 93    Resp: 16 16 18 18   Temp:  98.3 °F (36.8 °C) 98.8 °F (37.1 °C)    TempSrc:  Oral Oral    SpO2: 94% 95% 97%    Weight:       Height:           Physical examination  General Appearance: Thin, older -American male, lying on bed, alert and cooperative with exam, appears comfortable, no distress. Appears older than stated age.   Mouth/Throat: Oral mucosa moist  Neck: No JVD  Lungs: Symmetric air entry B/L, no rales, wheezing, rhonchi, no use of accessory muscles  Heart:  S1, S2 heard, blowing systolic murmur  GI: soft, non-tender, no guarding, normal bowel sounds  Extremities: No LE edema    Medications:  Infusion:    sodium chloride       Meds:    ketamine  100 mg IntraVENous Once    midazolam        doxycycline hyclate  100 mg Oral 2 times per day    dexamethasone  0.5 mg Oral Daily    docusate sodium  100 mg Oral BID    senna  1 tablet Oral Nightly    aspirin  81 mg Oral Daily    atorvastatin  40 mg Oral Nightly    cloNIDine  0.3 mg Oral TID    doxazosin  8 mg Oral Nightly    famotidine  20 mg Oral Daily    ferrous sulfate  325 mg Oral Daily with breakfast    folic acid  1,513 mcg Oral Daily    fluticasone  1 spray Nasal Daily    isosorbide mononitrate  120 mg Oral Daily    sertraline  100 mg Oral Daily    verapamil  240 mg Oral Daily    multivitamin  1 tablet Oral Daily    sodium chloride flush  10 mL IntraVENous 2 times per day    heparin (porcine)  5,000 Units SubCUTAneous 3 times per day     Meds prn: hydrOXYzine HCl, traZODone, sodium chloride flush, sodium chloride, ondansetron **OR** ondansetron, polyethylene glycol, acetaminophen, HYDROcodone 5 mg - acetaminophen **OR** HYDROcodone 5 mg - acetaminophen, morphine     Lab Data :  CBC:   Recent Labs     10/04/22  0336 10/05/22  0320 10/06/22  0415   WBC 3.2* 4.0* 3.7*   HGB 9.7* 9.8* 10.5*   HCT 32.4* 32.1* 34.2*   PLT 97* 101* 108*     CMP:  Recent Labs     10/04/22  0336 10/05/22  0320 10/05/22  1401 10/06/22  0415    137  --  136   K 5.0 5.7* 4.3 4.7    100  --  101   CO2 24 22*  --  23   BUN 38* 62*  --  37*   CREATININE 7.7* 9.6*  --  6.9*   GLUCOSE 71 77  --  77   CALCIUM 9.0 9.1  --  9.0   MG  --  2.5*  --   --    PHOS 5.4* 4.7  --   --      Hepatic: No results for input(s): LABALBU, AST, ALT, ALB, BILITOT, ALKPHOS in the last 72 hours. Assessment and Plan:    1. ESRD on HD TTS  -dialyzing MWF while inpatient  -HD tomorrow  2. Hyperkalemia : HD on 2K bath, low potassium diet  3. Hypertension: Restarted patient on minoxidil 5 mg twice daily today  4.. Acute hypoxic/hypercapneic resp failure  5.  Pulmonary edema, had extra HD on admission, improved with dialysis  6. Right pleural effusion s/p chest tube  7. Anemia in CKD  8. DM  9. Secondary hyperparathyroidism  10. Mild metabolic acidosis-normalized    D/W patient and RN    Seda Huddleston DO  Supervised by  Love Rothman DO  [facility]    This report has been created using voice recognition software.  It may contain minor errors which are inherent in voice recognition technology

## 2022-10-06 NOTE — CARE COORDINATION
Collaborative Discharge Planning    Joel George  :  1965  MRN:  [id number]    ADMIT DATE:  2022      Discharge Planning Discharge Planning  Type of Residence: House  Living Arrangements: Spouse/Significant Other  Support Systems: Spouse/Significant Other  Current Services Prior To Admission: None  Potential Assistance Needed: N/A  DME Ordered?: No  Potential Assistance Purchasing Medications: Yes  Type of Home Care Services: None  Patient expects to be discharged to[de-identified] House  Follow Up Appointment: Best Day/Time : Monday AM  One/Two Story Residence: [facility]  # of Interior Steps: 12  Height of Each Step (in): 6.5 INCHES  Interior Rails: Right  Lift Chair Available: No  History of falls?: No  White Board Notes /Social Work Whiteboard Notes  /Social Work Whiteboard: 10/5; SW: plans home w spouse Gabe Muro, has HD T-R-S w AVF; [facility] for RN services    Discharge Plan Home with home health  plans home w spouse Gabe Muro independently as PTA when medically cleared, current w [facility] M-T- [id number] chair time; B/W Cab transports to HD; has AVF; therapy recommends HH (nsg, therapy); SW following; monitor oxygen needs; prefers [facility] after choices offered  offered provider list with abbreviated version of the quality measures, geographic area, and applicable managed care information provided. Offered option of searching Medicare. gov website by using the computer in patient's room to review complete quality measures information.  Questions regarding selection process answered:family      Discharge Milestones and Delays: Clinical status  ESRD/Loculated Right Pleural Effusion/Pneumothorax/Right Entrapped Lung      Right Chest Tube  10/4 TPA #1  10/10 Right Thoracotomy w Decortication, Partial Lung Resection planned     Right Chest Tube Output = 270 ml/24h     Creatinine 6.9,      Oxygen 4L      SIGNED:  Jerry Rowell RN   10/6/2022, 12:19 PM

## 2022-10-06 NOTE — CONSULTS
CT/CV Surgery Consult Note    10/6/2022 10:13 AM  Surgeon:  Dr. Mike Ng     Reason for Consult: Entrapped Right Lung    HPI:    Mr. Elba Ceja is pleasant 55 y/o gentleman who presented to [facility] on 2022 with worsening shortness of breath. His PHMx includes current every day smoker, AAA, end stage renal disease on chronic dialysis, cocaine abuse, depression, diabetes mellitus, hyperlipidemia, hypertension, medical noncompliance and PTSD. Since admission he had chest tube placement for large right pleural effusion, chest tube drainage revealed exudative fluid cytology negative for malignant cells X3, and pt was started antibiotics. CT chest on 10/2/22 revealed entrapped right lung. TPA/dornase was administered and follow up CXRs and bronchoscopy reveal right lung remains entrapped. Vital Signs: BP (!) 169/90   Pulse 85   Temp 98.3 °F (36.8 °C) (Oral)   Resp 16   Ht 6' 3\" (1.905 m)   Wt 207 lb (93.9 kg)   SpO2 95%   BMI 25.87 kg/m²    Temp (24hrs), Av.3 °F (36.8 °C), Min:97.8 °F (36.6 °C), Max:99.1 °F (37.3 °C)    Labs:   CBC:   Recent Labs     10/04/22  0336 10/05/22  0320 10/06/22  0415   WBC 3.2* 4.0* 3.7*   HGB 9.7* 9.8* 10.5*   HCT 32.4* 32.1* 34.2*   .8* 102.6* 102.4*   PLT 97* 101* 108*     BMP:   Recent Labs     10/04/22  0336 10/04/22  0748 10/05/22  0320 10/05/22  1401 10/05/22  1647 10/06/22  0415     --  137  --   --  136   K 5.0  --  5.7* 4.3  --  4.7     --  100  --   --  101   CO2 24  --  22*  --   --  23   PHOS 5.4*  --  4.7  --   --   --    BUN 38*  --  62*  --   --  37*   CREATININE 7.7*  --  9.6*  --   --  6.9*   MG  --   --  2.5*  --   --   --    POCGLU  --    < >  --   --  147*  --     < > = values in this interval not displayed.      Trop:   Lab Results   Component Value Date    TROPONINT 0.054 (A) 2022     Last HgA1C:   Lab Results   Component Value Date    LABA1C 5.0 2020     Imaging:  CTA chest: 10/2/22  FINDINGS:    The patient is status post endoluminal graft placement for an aortic dissection       The caliber of the descending thoracic aorta is slightly smaller than on remote study dated 26th of February 2020. There is no definite evidence for an acute endoleak. There is aortic dissection extending into the abdominal aorta. There is cardiomegaly with mitral valve calcification. There is a right-sided chest tube at the base of the right hemithorax. There is a small pneumothorax. There is a small pleural effusion and infiltrate in the right lower lobe. There is mild prominence of the main, right and left pulmonary arteries. This may represent pulmonary hypertension. There is evidence for old granulomatous disease in the right lower lobe, right hilum and mediastinum. There are small kidneys present bilaterally. Impression       1. Endoluminal graft in the thoracic aorta. This appears slightly smaller than on remote study dated 2/26/2020. There is no definite evidence of an endoleak. Please correlate clinically. 2. Cardiomegaly. Mitral valve calcification. 3. Right-sided chest tube. Small pneumothorax and effusion at the right lung base. Right lower lobe infiltrate. 4. Evidence for granulomatous disease in the right lower lobe, right hilum and mediastinum. 5. Thoracic spondylosis. 6. Old granulomatous disease in the spleen. 7. Small kidneys bilaterally. **This report has been created using voice recognition software. It may contain minor errors which are inherent in voice recognition technology. **       Final report electronically signed by DR Niles Flood on 10/2/2022 11:48 AM     CXR: 10/5/22  FINDINGS:   There is a small pneumothorax at the right lung base. There is small right    pleural effusion. There is right lung base consolidation. The left lung is    grossly clear. The cardiac silhouette is enlarged.  The right chest tube    tip is at the right lung base. The patient has the thoracic aortic stent    graft. Impression       Small pneumothorax at the right lung base. Chest tube tip is at the right    lung base, grossly unchanged. Small right pleural effusion, decreased. Right lung base consolidation, increased, suspicious for atelectasis or    pneumonia. Cardiomegaly. This document has been electronically signed by: Caitlyn Trivedi MD on    10/05/2022 03:41 AM         Intake/Output Summary (Last 24 hours) at 10/6/2022 1013  Last data filed at 10/6/2022 0700  Gross per 24 hour   Intake 400 ml   Output 2670 ml   Net -2270 ml     Home Meds:  Prior to Admission medications    Medication Sig Start Date End Date Taking? Authorizing Provider   atorvastatin (LIPITOR) 80 MG tablet TAKE 1/2 TABLET BY MOUTH ONCE DAILY IN THE EVENING  Patient not taking: Reported on 9/29/2022 9/2/22   Historical Provider, MD   sevelamer (RENVELA) 800 MG tablet Take 1,600 mg by mouth    Historical Provider, MD   Sucroferric Oxyhydroxide (VELPHORO) 500 MG CHEW Take 500 mg by mouth 4 times daily (before meals and nightly) Take with meals and snacks    Historical Provider, MD   Multiple Vitamins-Minerals (THERAPEUTIC MULTIVITAMIN-MINERALS) tablet Take 1 tablet by mouth daily    Historical Provider, MD   folic acid (FOLVITE) 180 MCG tablet Take 800 mcg by mouth daily    Historical Provider, MD   vitamin E 400 UNIT capsule Take 400 Units by mouth daily    Historical Provider, MD   minoxidil (LONITEN) 10 MG tablet Take 2 tablets by mouth every 12 hours for 28 days  Patient taking differently: Take 10 mg by mouth 2 times daily Take 15mg every 12 hours 3/13/20 7/27/20  Grover Denny MD   ferrous sulfate (FE TABS 325) 325 (65 Fe) MG EC tablet Take 1 tablet by mouth 3 times daily (with meals)  Patient taking differently: Take 325 mg by mouth daily (with breakfast) 3/13/20   Grover Denny MD   nitroGLYCERIN (NITROSTAT) 0.4 MG SL tablet up to max of 3 total doses.  If no relief after 1 dose, call 911. 2/26/20   Nicolás Betancourt MD   acetaminophen (TYLENOL) 325 MG tablet Take 650 mg by mouth every 4 hours as needed for Pain or Fever    Historical Provider, MD   B Complex-C-Zn-Folic Acid (DIALYVITE 317-AXYF 15 PO) Take 1 tablet by mouth daily    Historical Provider, MD   verapamil (CALAN SR) 240 MG extended release tablet Take 240 mg by mouth daily    Historical Provider, MD   vitamin D (CHOLECALCIFEROL) 25 MCG (1000 UT) TABS tablet Take 1,000 Units by mouth 2 times daily Take two tablets twice a day    Historical Provider, MD   cloNIDine (CATAPRES) 0.3 MG tablet Take 1 tablet by mouth 3 times daily 1/28/20 9/28/22  WADE Mejia CNP   doxazosin (CARDURA) 8 MG tablet Take 1 tablet by mouth every 12 hours for 28 days  Patient taking differently: Take 8 mg by mouth nightly  1/28/20 7/27/20  WADE Mejia CNP   famotidine (PEPCID) 20 MG tablet Take 1 tablet by mouth daily 1/28/20   WADE Mejia CNP   sertraline (ZOLOFT) 100 MG tablet Take 100 mg by mouth daily    Historical Provider, MD   hydrOXYzine (ATARAX) 50 MG tablet Take 50 mg by mouth 3 times daily as needed for Itching    Historical Provider, MD   aspirin 81 MG EC tablet Take 1 tablet by mouth daily 3/23/18   Ada Orona MD   isosorbide mononitrate (IMDUR) 120 MG extended release tablet Take 1 tablet by mouth daily 3/24/18   Ada Orona MD   traZODone (DESYREL) 50 MG tablet Take 1 tablet by mouth nightly as needed for Sleep 3/23/18   Ada Orona MD   atorvastatin (LIPITOR) 40 MG tablet Take 1 tablet by mouth nightly 3/23/18   Ada Orona MD   fluticasone (FLONASE) 50 MCG/ACT nasal spray 1 spray by Nasal route daily     Historical Provider, MD     PastMedical History:  Wendy Chaves  has a past medical history of AAA (abdominal aortic aneurysm) ([facility] [address] NURIS (acute kidney injury) ([facility] [address] Anemia associated with chronic renal failure, Arthritis, Cocaine abuse ([address] Depression, Diabetes mellitus ([facility] [address] FSGS (focal segmental glomerulosclerosis), Hemodialysis patient ([facility] [address] Hemorrhoids, History of blood transfusion, Hyperlipidemia, Hyperphosphatemia, Hypertension, Left renal artery stenosis ([address] Monoclonal (M) protein disease, multiple 'M' protein, Nicotine dependence, Noncompliance, Pneumonia, Psychiatric problem, PTSD (post-traumatic stress disorder), Secondary hyperparathyroidism (of renal origin), and Sleep apnea. Past Surgical History:  The patient  has a past surgical history that includes Leg Tendon Surgery; EKG 12 Lead (9/24/2015); Dialysis fistula creation (Left, 07/08/2016); vascular surgery (Left, 07/08/2016); vascular surgery (Right, 1990); Hand tendon surgery (Left, 4/5/2019); and Abdominal aortic aneurysm repair. Allergies: The patient is allergic to humalog [insulin lispro], insulin regular human, and lisinopril. Family History: This patient's family history includes Cancer in his mother; Other in his brother. Social History:  Kaleigh Mendoza  reports that he has been smoking cigarettes. He started smoking about 37 years ago. He has a 5.00 pack-year smoking history. He has never used smokeless tobacco. He reports that he does not currently use drugs. He reports that he does not drink alcohol. ROS:  Constitutional: Negative for activity change, chills, fatigue, fever and unexpected weight change. HENT: Negative for congestion, facial swelling, sore throat, and changes in voice. Eyes: Negative for photophobia, redness, itching and visual disturbance. Respiratory: SOB and AGUILAR on admission   Cardiovascular: Negative for chest pain, palpitation. Mild peripheral edema  Gastrointestinal: Negative for abdominal distention, constipation, nausea and vomiting. Endocrine: Negative for cold intolerance, heat intolerance, polyphagia and polyuria. Musculoskeletal: Negative for arthralgias, gait problem, and myalgias. Skin: Negative for color change, rash and wound. Allergic/Immunologic: Negative for food allergies and immunocompromised state. Neurological: Negative for dizziness, tremors, speech difficulty, weakness, numbness and headaches. Hematological: Negative for adenopathy. Does not bruise/bleed easily. Psychiatric/Behavioral: Negative for agitation, confusion, and dysphoric mood. Physical Exam:   General appearance:  No apparent distress, appears stated age and cooperative. HEENT:  Normal cephalic, atraumatic without obvious deformity. Conjunctivae/corneas clear. Neck: Supple, with full range of motion. No jugular venous distention. Trachea midline. Respiratory:  Left chest CTA, Right chest decrease BS right lung base  Cardiovascular:  Regular rate and rhythm, SEJ murmur present   Abdomen: Soft, non-tender, non-distended with normal bowel sounds. Musculoskeletal:  No clubbing, cyanosis or edema bilaterally. Full range of motion without deformity. Skin: Skin color, texture, turgor normal.  No rashes or lesions. Neurologic:  Neurovascularly intact without any focal sensory/motor deficits. Psychiatric:  Alert and oriented, thought content appropriate, normal insight.   Peripheral Pulses: +1-2 palpable, equal bilaterally      Assessment:   Patient Active Problem List   Diagnosis    FSGS (focal segmental glomerulosclerosis)    Diabetes mellitus type 2, controlled ([address]    Monoclonal (M) protein disease, multiple 'M' protein    Depression    PTSD (post-traumatic stress disorder)    Secondary renal hyperparathyroidism ([address]    Cocaine use    Substance induced mood disorder (HCC)    Renal artery stenosis (HCC) with uncontrollable hypertension    Chronic alcoholism (HCC)    Severe cocaine use disorder (HCC)    Uncontrolled hypertension    LVH (left ventricular hypertrophy) due to hypertensive disease    Acute on chronic diastolic CHF (congestive heart failure) ([facility])    REZA (obstructive sleep apnea)    Headache, unspecified headache type    FH: HTN (hypertension)    Hypertrophic cardiomyopathy (HCC)    Diet-controlled diabetes mellitus (HCC)    A-V fistula (HCC)    Chronic thoracic aortic dissection    Hyperphosphatemia    Anemia in CKD (chronic kidney disease)    Vitamin D deficiency    Thrombocytopenia (HCC)    Personality disorder (HCC)    Tobacco abuse    Metabolic acidosis    Pneumonia due to organism    Precordial pain    ESRD on hemodialysis (HCC)    Edema of upper extremity    ESRD (end stage renal disease) (HCC)    Hyperglycemia    Cocaine abuse, continuous - reports spending $100 on cocaine \"every other day\"    Homelessness    Moderate episode of recurrent major depressive disorder ([address]    Noncompliance of patient with renal dialysis ([address]    Anemia associated with stage 5 chronic renal failure ([address]    Hypertensive emergency    Cocaine use disorder, severe, dependence ([address]    Alcohol use disorder, severe, dependence ([address]    Abdominal aortic aneurysm (AAA) without rupture    Atrial flutter with rapid ventricular response (HCC)    PVD (peripheral vascular disease) (HCC)    Dyslipidemia    Other fluid overload    Laceration of flexor muscle, fascia and tendon of left thumb at forearm level, initial encounter    Acute respiratory failure with hypoxia and hypercarbia (HCC)    Acute pulmonary edema (HCC)    Macrocytic anemia    History of aortic aneurysm repair    Medical non-compliance    Volume overload    Chest pain    Dyspnea    Pancytopenia (HCC)    Acute febrile illness    Hyponatremia    Hypermagnesemia    History of atrial flutter    History of alcohol abuse    History of cocaine abuse ([address]    Mild tricuspid regurgitation    Uremia, acute    COVID-19 virus detected    COVID-19    Hypoxia     Plan: 22/4/77  Dr. Vamshi Arias for Right Thoracotomy with decortication and partial lung resection on Monday 10/10/22  Plan for HD early Monday morning preoperatively.     The plan of care was discussed in detail with Dr. Chandler Diaz discussed in detail with the patient, who understands and has no further questions. Time spent with patient: 65 minutes, of which more than 50% was spent counseling/coordinating the patient's care.     Electronically signed by Victoria Donaldson PA-C on 10/6/2022 at 10:13 AM

## 2022-10-06 NOTE — PROGRESS NOTES
Comprehensive Nutrition Assessment    Type and Reason for Visit:  Reassess    Nutrition Recommendations/Plan:   Continue diet as ordered and encourage PO intake at best efforts. Re-initiate nepro TID as pt drinks at home and appropriate for HD pts. Monitor nutrition related lab values, PO intake, weights, fluid status, medications and provide further nutrition recommendations as necessary. Malnutrition Assessment:  Malnutrition Status: At risk for malnutrition (Comment) (10/06/22 1045)    Context:  Acute Illness     Findings of the 6 clinical characteristics of malnutrition:  Energy Intake:   (intake much improved since PTA)  Weight Loss:  Unable to assess (HD pt- wt flucuates)     Body Fat Loss:  No significant body fat loss     Muscle Mass Loss:  No significant muscle mass loss    Fluid Accumulation:  Moderate to Severe     Strength:  Not Performed    Nutrition Assessment:     Pt improving from a nutritional standpoint AEB pt reports good appetite and average intake %. Remains at risk for further nutritional compromise r/t admit 9/28- presented with SOB; large right sided pleural effusion- R chest tube placed 9/28; bronch and chest tube 10/6; respiratory acidosis; and underlying medical condition (PMH: cocaine abuse, DM, ESRD on HD).        Nutrition Related Findings:    Pt. Report/Treatments/Miscellaneous: Planning R thoracotomy with decortication and partial lung resection on 10/10/22; receiving HD MWF; spoke with patient at bedside this morning- reports good appetite and intake; pt reports he would like nepro continued-drinks at home; discussed nutrition with RN  GI Status: last BM per EMR 10/6  Pertinent Labs: Na 136, K 4.7, BUN 37, creatinine 6.9, glucose 77, hgb 10.5  Pertinent Meds: colace, pepcid, iron, folvite, MVI, senna     Wound Type: None       Current Nutrition Intake & Therapies:    Average Meal Intake: % (good intake and appetite per pt report and EMR documentation)  Average Supplements Intake:  (to re-start today)  ADULT DIET; Regular; 5 carb choices (75 gm/meal); Low Fat/Low Chol/High Fiber/2 gm Na; Low Sodium (2 gm); Low Potassium (Less than 3000 mg/day); Low Phosphorus (Less than 1000 mg)  ADULT ORAL NUTRITION SUPPLEMENT; Breakfast, Lunch; Renal Oral Supplement    Anthropometric Measures:  Height: 6' 3\" (190.5 cm)  Ideal Body Weight (IBW): 196 lbs (89 kg)    Admission Body Weight: 231 lb 9.6 oz (105.1 kg) (9/28: +2 pitting edema)  Current Body Weight: 207 lb (93.9 kg) (10/6: +1 BLE),   IBW. Weight Source: Bed Scale  Current BMI (kg/m2): 25.9  Usual Body Weight:  (UBW ~230 lbs; wt hx per EMR: 1/28/20: 227 lbs 15.3 oz, 2/26/20: 236 lbs 8.9 oz, 7/7/20: 225 lbs 4.8 oz, 9/15/20: 228 lbs 3.2 oz, 9/17/20: 214 lbs 15.2 oz, 9/28/22: 217 lbs 9.5 oz)                       BMI Categories: Overweight (BMI 25.0-29. 9)    Estimated Daily Nutrient Needs:  Energy Requirements Based On: Kcal/kg  Weight Used for Energy Requirements:  ((9/29))  Energy (kcal/day): 1081-6260 kcals/day (25-30 kcals/kg ABW) (Increased nutritional needs d/t HD dialysis per MNT recommedations)  Weight Used for Protein Requirements: Ideal  Protein (g/day): 107-134 grams (1.2-1.5 grams protein/kgm IBW)         Nutrition Diagnosis:   Inadequate protein-energy intake related to acute injury/trauma as evidenced by poor intake prior to admission (wife reports decreased intake x2-3 weeks d/t SOB and not feeling well.)    Nutrition Interventions:   Food and/or Nutrient Delivery: Continue Current Diet, Start Oral Nutrition Supplement  Nutrition Education/Counseling: Education initiated (left written renal carb controlled diet materials in pt's room. Pt N/A x 2  & to have multiple procedures today per rounds.  (9/30))  Coordination of Nutrition Care: Continue to monitor while inpatient       Goals:     Goals: PO intake 75% or greater, by next RD assessment       Nutrition Monitoring and Evaluation: Food/Nutrient Intake Outcomes: Food and Nutrient Intake, Supplement Intake  Physical Signs/Symptoms Outcomes: Biochemical Data, Meal Time Behavior, Nutrition Focused Physical Findings, Skin, Weight, Fluid Status or Edema    Discharge Planning:    Continue Oral Nutrition Supplement, Continue current diet     Lico Moody RD  Contact: [phone]

## 2022-10-06 NOTE — PROCEDURES
BRONCHOSCOPY:Risks and benefits to the procedure were discussed. Alternatives and their risks were discussed as well. Indication:  Entrapped lung    Sedation:   4 mg Versed. 100 mg Ketamine    Findings:  No airway obstruction  Chronic inflammation with pitting airways disease. Scant mucous in lower lobes. No mass identified. Samples:   RLL BAL with 120 ml lavage and 40 ml returned. EBL:   none    Complications:  none    Procedure:  After informed consent and time out completed, patient received oxygen to prep for procedure. Patient received sedation as noted above. Posterior oropharynx anesthetized with Cetacaine. Utilizing the left naris, the bronchoscope was passed to the level of the cords. Scope was taken to the trachea and subsequently the jessica. Scope was taken to the right lung fields, and then the left lung fields. Findings and sampling detailed above. Scope was removed. Electronically signed by Elizabeth Gonzalez MD.

## 2022-10-06 NOTE — PROGRESS NOTES
[address] THERAPY MISSED TREATMENT NOTE  [facility] ICU STEPDOWN TELEMETRY 4K  [id number]      Date: 10/6/2022  Patient Name: Dorian Bowen        CSN: [id number]   : 1965  (56 y.o.)  Gender: male   Referring Practitioner: Dr. Gabriele Norris MD  Diagnosis: Hypoxia         REASON FOR MISSED TREATMENT: Patient Unavailable First attempt patient at bronchoscopy, second attempt pt stating he was too fatigued and requesting pain medication, will attempt back as time allows.

## 2022-10-07 ENCOUNTER — APPOINTMENT (OUTPATIENT)
Dept: GENERAL RADIOLOGY | Age: 57
DRG: 163 | End: 2022-10-07
Payer: MEDICARE

## 2022-10-07 LAB
ANION GAP SERPL CALCULATED.3IONS-SCNC: 10 MEQ/L (ref 8–16)
BUN BLDV-MCNC: 61 MG/DL (ref 7–22)
CALCIUM SERPL-MCNC: 9.2 MG/DL (ref 8.5–10.5)
CHLORIDE BLD-SCNC: 100 MEQ/L (ref 98–111)
CO2: 25 MEQ/L (ref 23–33)
CREAT SERPL-MCNC: 9.4 MG/DL (ref 0.4–1.2)
ERYTHROCYTE [DISTWIDTH] IN BLOOD BY AUTOMATED COUNT: 13 % (ref 11.5–14.5)
ERYTHROCYTE [DISTWIDTH] IN BLOOD BY AUTOMATED COUNT: 48.1 FL (ref 35–45)
GFR SERPL CREATININE-BSD FRML MDRD: 7 ML/MIN/1.73M2
GLUCOSE BLD-MCNC: 83 MG/DL (ref 70–108)
HCT VFR BLD CALC: 31.8 % (ref 42–52)
HEMOGLOBIN: 9.9 GM/DL (ref 14–18)
MCH RBC QN AUTO: 31.8 PG (ref 26–33)
MCHC RBC AUTO-ENTMCNC: 31.1 GM/DL (ref 32.2–35.5)
MCV RBC AUTO: 102.3 FL (ref 80–94)
PLATELET # BLD: 107 THOU/MM3 (ref 130–400)
PMV BLD AUTO: 9.9 FL (ref 9.4–12.4)
POTASSIUM SERPL-SCNC: 5.4 MEQ/L (ref 3.5–5.2)
RBC # BLD: 3.11 MILL/MM3 (ref 4.7–6.1)
SODIUM BLD-SCNC: 135 MEQ/L (ref 135–145)
WBC # BLD: 4.9 THOU/MM3 (ref 4.8–10.8)

## 2022-10-07 PROCEDURE — 2580000003 HC RX 258: Performed by: PHYSICIAN ASSISTANT

## 2022-10-07 PROCEDURE — 99233 SBSQ HOSP IP/OBS HIGH 50: CPT | Performed by: INTERNAL MEDICINE

## 2022-10-07 PROCEDURE — 6370000000 HC RX 637 (ALT 250 FOR IP): Performed by: INTERNAL MEDICINE

## 2022-10-07 PROCEDURE — 90935 HEMODIALYSIS ONE EVALUATION: CPT

## 2022-10-07 PROCEDURE — 6370000000 HC RX 637 (ALT 250 FOR IP)

## 2022-10-07 PROCEDURE — 2060000000 HC ICU INTERMEDIATE R&B

## 2022-10-07 PROCEDURE — P9047 ALBUMIN (HUMAN), 25%, 50ML: HCPCS | Performed by: INTERNAL MEDICINE

## 2022-10-07 PROCEDURE — 36415 COLL VENOUS BLD VENIPUNCTURE: CPT

## 2022-10-07 PROCEDURE — 6360000002 HC RX W HCPCS: Performed by: INTERNAL MEDICINE

## 2022-10-07 PROCEDURE — 6360000002 HC RX W HCPCS: Performed by: PHYSICIAN ASSISTANT

## 2022-10-07 PROCEDURE — 71045 X-RAY EXAM CHEST 1 VIEW: CPT

## 2022-10-07 PROCEDURE — 80048 BASIC METABOLIC PNL TOTAL CA: CPT

## 2022-10-07 PROCEDURE — 85027 COMPLETE CBC AUTOMATED: CPT

## 2022-10-07 PROCEDURE — 99232 SBSQ HOSP IP/OBS MODERATE 35: CPT | Performed by: INTERNAL MEDICINE

## 2022-10-07 PROCEDURE — 90935 HEMODIALYSIS ONE EVALUATION: CPT | Performed by: INTERNAL MEDICINE

## 2022-10-07 PROCEDURE — 6370000000 HC RX 637 (ALT 250 FOR IP): Performed by: PHYSICIAN ASSISTANT

## 2022-10-07 PROCEDURE — APPSS30 APP SPLIT SHARED TIME 16-30 MINUTES: Performed by: PHYSICIAN ASSISTANT

## 2022-10-07 PROCEDURE — 6370000000 HC RX 637 (ALT 250 FOR IP): Performed by: STUDENT IN AN ORGANIZED HEALTH CARE EDUCATION/TRAINING PROGRAM

## 2022-10-07 PROCEDURE — 6370000000 HC RX 637 (ALT 250 FOR IP): Performed by: NURSE PRACTITIONER

## 2022-10-07 RX ORDER — ALBUMIN (HUMAN) 12.5 G/50ML
25 SOLUTION INTRAVENOUS ONCE
Status: COMPLETED | OUTPATIENT
Start: 2022-10-07 | End: 2022-10-07

## 2022-10-07 RX ORDER — MINOXIDIL 2.5 MG/1
2.5 TABLET ORAL 2 TIMES DAILY
Status: DISCONTINUED | OUTPATIENT
Start: 2022-10-07 | End: 2022-10-15 | Stop reason: HOSPADM

## 2022-10-07 RX ADMIN — CLONIDINE HYDROCHLORIDE 0.3 MG: 0.2 TABLET ORAL at 13:37

## 2022-10-07 RX ADMIN — HEPARIN SODIUM 5000 UNITS: 5000 INJECTION INTRAVENOUS; SUBCUTANEOUS at 13:38

## 2022-10-07 RX ADMIN — ASPIRIN 81 MG: 81 TABLET, COATED ORAL at 13:38

## 2022-10-07 RX ADMIN — DOXYCYCLINE HYCLATE 100 MG: 100 TABLET, COATED ORAL at 21:50

## 2022-10-07 RX ADMIN — HEPARIN SODIUM 5000 UNITS: 5000 INJECTION INTRAVENOUS; SUBCUTANEOUS at 06:10

## 2022-10-07 RX ADMIN — ISOSORBIDE MONONITRATE 120 MG: 60 TABLET, EXTENDED RELEASE ORAL at 06:58

## 2022-10-07 RX ADMIN — VERAPAMIL HYDROCHLORIDE 240 MG: 240 TABLET, FILM COATED, EXTENDED RELEASE ORAL at 06:59

## 2022-10-07 RX ADMIN — FAMOTIDINE 20 MG: 20 TABLET ORAL at 13:38

## 2022-10-07 RX ADMIN — DEXAMETHASONE 0.5 MG: 0.5 TABLET ORAL at 13:38

## 2022-10-07 RX ADMIN — HYDROCODONE BITARTRATE AND ACETAMINOPHEN 1 TABLET: 5; 325 TABLET ORAL at 12:38

## 2022-10-07 RX ADMIN — MINOXIDIL 2.5 MG: 2.5 TABLET ORAL at 21:49

## 2022-10-07 RX ADMIN — CLONIDINE HYDROCHLORIDE 0.3 MG: 0.2 TABLET ORAL at 21:50

## 2022-10-07 RX ADMIN — Medication 1 TABLET: at 13:36

## 2022-10-07 RX ADMIN — FERROUS SULFATE TAB 325 MG (65 MG ELEMENTAL FE) 325 MG: 325 (65 FE) TAB at 13:36

## 2022-10-07 RX ADMIN — DOXYCYCLINE HYCLATE 100 MG: 100 TABLET, COATED ORAL at 06:59

## 2022-10-07 RX ADMIN — SODIUM CHLORIDE, PRESERVATIVE FREE 10 ML: 5 INJECTION INTRAVENOUS at 07:00

## 2022-10-07 RX ADMIN — FLUTICASONE PROPIONATE 1 SPRAY: 50 SPRAY, METERED NASAL at 07:00

## 2022-10-07 RX ADMIN — HYDROCODONE BITARTRATE AND ACETAMINOPHEN 2 TABLET: 5; 325 TABLET ORAL at 21:50

## 2022-10-07 RX ADMIN — MINOXIDIL 5 MG: 2.5 TABLET ORAL at 06:59

## 2022-10-07 RX ADMIN — CLONIDINE HYDROCHLORIDE 0.3 MG: 0.2 TABLET ORAL at 06:59

## 2022-10-07 RX ADMIN — DOCUSATE SODIUM 100 MG: 100 CAPSULE, LIQUID FILLED ORAL at 06:59

## 2022-10-07 RX ADMIN — ALBUMIN (HUMAN) 25 G: 0.25 INJECTION, SOLUTION INTRAVENOUS at 08:45

## 2022-10-07 RX ADMIN — DOCUSATE SODIUM 100 MG: 100 CAPSULE, LIQUID FILLED ORAL at 21:50

## 2022-10-07 RX ADMIN — SERTRALINE 100 MG: 100 TABLET, FILM COATED ORAL at 13:36

## 2022-10-07 RX ADMIN — DOXAZOSIN 8 MG: 4 TABLET ORAL at 21:50

## 2022-10-07 RX ADMIN — SENNOSIDES 8.6 MG: 8.6 TABLET, COATED ORAL at 21:51

## 2022-10-07 RX ADMIN — ATORVASTATIN CALCIUM 40 MG: 40 TABLET, FILM COATED ORAL at 21:50

## 2022-10-07 RX ADMIN — HEPARIN SODIUM 5000 UNITS: 5000 INJECTION INTRAVENOUS; SUBCUTANEOUS at 21:51

## 2022-10-07 RX ADMIN — FOLIC ACID 1000 MCG: 1 TABLET ORAL at 13:36

## 2022-10-07 RX ADMIN — SODIUM CHLORIDE, PRESERVATIVE FREE 10 ML: 5 INJECTION INTRAVENOUS at 21:49

## 2022-10-07 ASSESSMENT — PAIN SCALES - WONG BAKER
WONGBAKER_NUMERICALRESPONSE: 0

## 2022-10-07 ASSESSMENT — PAIN DESCRIPTION - ONSET
ONSET: ON-GOING
ONSET: ON-GOING

## 2022-10-07 ASSESSMENT — PAIN DESCRIPTION - ORIENTATION
ORIENTATION: RIGHT

## 2022-10-07 ASSESSMENT — PAIN - FUNCTIONAL ASSESSMENT
PAIN_FUNCTIONAL_ASSESSMENT: ACTIVITIES ARE NOT PREVENTED
PAIN_FUNCTIONAL_ASSESSMENT: ACTIVITIES ARE NOT PREVENTED
PAIN_FUNCTIONAL_ASSESSMENT: PREVENTS OR INTERFERES SOME ACTIVE ACTIVITIES AND ADLS

## 2022-10-07 ASSESSMENT — PAIN DESCRIPTION - LOCATION
LOCATION: CHEST

## 2022-10-07 ASSESSMENT — PAIN SCALES - GENERAL
PAINLEVEL_OUTOF10: 4
PAINLEVEL_OUTOF10: 8
PAINLEVEL_OUTOF10: 4
PAINLEVEL_OUTOF10: 0
PAINLEVEL_OUTOF10: 6

## 2022-10-07 ASSESSMENT — PAIN DESCRIPTION - DESCRIPTORS
DESCRIPTORS: ACHING

## 2022-10-07 ASSESSMENT — PAIN DESCRIPTION - FREQUENCY
FREQUENCY: INTERMITTENT
FREQUENCY: INTERMITTENT

## 2022-10-07 ASSESSMENT — PAIN DESCRIPTION - PAIN TYPE
TYPE: ACUTE PAIN
TYPE: ACUTE PAIN

## 2022-10-07 NOTE — FLOWSHEET NOTE
Stable 4 hour treatment complete. Removed 0.5  liters of fluid as per order. Tolerated fluid removal well. Pressure held to needle sites times ten minutes each. Dressing clean, dry and intact. Report given to primary RN. Treatment record printed for scanning into EMR. 10/07/22 0730 10/07/22 1200   Vital Signs   BP (!) 100/51 (!) 114/56   Temp 97.5 °F (36.4 °C) 98 °F (36.7 °C)   Heart Rate 77 72   Resp 19 16   SpO2 96 % 96 %   Weight 214 lb 4.6 oz (97.2 kg) 213 lb 3 oz (96.7 kg)   Weight Method Bed scale Bed scale   Percent Weight Change 3.52 -0.51   Post-Hemodialysis Assessment   Post-Treatment Procedures  --  Blood returned; Access bleeding time < 10 minutes   Machine Disinfection Process  --  Acid/Vinegar Clean;Heat Disinfect; Exterior Machine Disinfection   Blood Volume Processed (Liters)  --  101.2 l/min   Dialyzer Clearance  --  Lightly streaked   Duration of Treatment (minutes)  --  240 minutes   Heparin Amount Administered During Treatment (mL)  --  0 mL   Hemodialysis Intake (ml)  --  400 ml   Hemodialysis Output (ml)  --  900 ml   NET Removed (ml)  --  500   Tolerated Treatment  --  Good

## 2022-10-07 NOTE — PROGRESS NOTES
CT/CV Surgery Progress Note    10/7/2022 7:36 AM  Surgeon:  Dr. Ardyce Hodgkins     Subjective: Mr. Makenna Farrell is resting comfortably in bed on 1L NC, alert, and in no acute distress. He receiving HD this morning and tolerating it well. Chest tube output 170 mL past 24 hrs. I/O last 3 completed shifts:   In: 530 [P.O.:530]  Out: 320 [Chest Tube:320]    Vital Signs: BP (!) 126/57   Pulse 76   Temp 98.1 °F (36.7 °C) (Oral)   Resp 18   Ht 6' 3\" (1.905 m)   Wt 207 lb (93.9 kg)   SpO2 99%   BMI 25.87 kg/m²    Temp (24hrs), Av.3 °F (36.8 °C), Min:97.5 °F (36.4 °C), Max:98.8 °F (37.1 °C)    Labs:   CBC:   Recent Labs     10/05/22  0320 10/06/22  0415 10/07/22  0434   WBC 4.0* 3.7* 4.9   HGB 9.8* 10.5* 9.9*   HCT 32.1* 34.2* 31.8*   .6* 102.4* 102.3*   * 108* 107*     BMP:   Recent Labs     10/05/22  0320 10/05/22  1401 10/05/22  1647 10/06/22  0415 10/07/22  043     --   --  136 135   K 5.7* 4.3  --  4.7 5.4*     --   --  101 100   CO2 22*  --   --  23 25   PHOS 4.7  --   --   --   --    BUN 62*  --   --  37* 61*   CREATININE 9.6*  --   --  6.9* 9.4*   MG 2.5*  --   --   --   --    POCGLU  --   --  147*  --   --      Imaging:  Chest X-Ray: 10/7/2022   Pending     Intake/Output Summary (Last 24 hours) at 10/7/2022 0736  Last data filed at 10/7/2022 0441  Gross per 24 hour   Intake 530 ml   Output 170 ml   Net 360 ml       Scheduled Meds:    ketamine  100 mg IntraVENous Once    minoxidil  5 mg Oral BID    doxycycline hyclate  100 mg Oral 2 times per day    dexamethasone  0.5 mg Oral Daily    docusate sodium  100 mg Oral BID    senna  1 tablet Oral Nightly    aspirin  81 mg Oral Daily    atorvastatin  40 mg Oral Nightly    cloNIDine  0.3 mg Oral TID    doxazosin  8 mg Oral Nightly    famotidine  20 mg Oral Daily    ferrous sulfate  325 mg Oral Daily with breakfast    folic acid  6,249 mcg Oral Daily    fluticasone  1 spray Nasal Daily    isosorbide mononitrate  120 mg Oral Daily    sertraline 100 mg Oral Daily    verapamil  240 mg Oral Daily    multivitamin  1 tablet Oral Daily    sodium chloride flush  10 mL IntraVENous 2 times per day    heparin (porcine)  5,000 Units SubCUTAneous 3 times per day     ROS: All neg unless specifically mentioned in subjective section. Exam:  General Appearance: alert ,conversing, in no acute distress  Cardiovascular: normal rate, regular rhythm, normal S1 and S2, no murmurs, rubs, clicks, or gallops  Pulmonary/Chest: Left chest CTA.   Decrease BS noted right lung base  Neurological: alert, oriented, normal speech, no focal findings or movement disorder noted      Assessment:   Patient Active Problem List   Diagnosis    FSGS (focal segmental glomerulosclerosis)    Diabetes mellitus type 2, controlled ([address]    Monoclonal (M) protein disease, multiple 'M' protein    Depression    PTSD (post-traumatic stress disorder)    Secondary renal hyperparathyroidism ([facility])    Cocaine use    Substance induced mood disorder ([address]    Renal artery stenosis ([facility]) with uncontrollable hypertension    Chronic alcoholism ([facility])    Severe cocaine use disorder ([facility])    Uncontrolled hypertension    LVH (left ventricular hypertrophy) due to hypertensive disease    Acute on chronic diastolic CHF (congestive heart failure) ([facility])    REZA (obstructive sleep apnea)    Headache, unspecified headache type    FH: HTN (hypertension)    Hypertrophic cardiomyopathy ([address]    Diet-controlled diabetes mellitus ([address]    A-V fistula ([address]    Chronic thoracic aortic dissection    Hyperphosphatemia    Anemia in CKD (chronic kidney disease)    Vitamin D deficiency    Thrombocytopenia (HCC)    Personality disorder ([facility])    Tobacco abuse    Metabolic acidosis    Pneumonia due to organism    Precordial pain    ESRD on hemodialysis ([facility])    Edema of upper extremity    ESRD (end stage renal disease) ([facility])    Hyperglycemia    Cocaine abuse, continuous - reports spending $100 on cocaine \"every other day\"    Homelessness Moderate episode of recurrent major depressive disorder ([address]    Noncompliance of patient with renal dialysis ([address]    Anemia associated with stage 5 chronic renal failure ([address]    Hypertensive emergency    Cocaine use disorder, severe, dependence ([address]    Alcohol use disorder, severe, dependence ([address]    Abdominal aortic aneurysm (AAA) without rupture    Atrial flutter with rapid ventricular response (HCC)    PVD (peripheral vascular disease) ([address]    Dyslipidemia    Other fluid overload    Laceration of flexor muscle, fascia and tendon of left thumb at forearm level, initial encounter    Acute respiratory failure with hypoxia and hypercarbia (HCC)    Acute pulmonary edema (HCC)    Macrocytic anemia    History of aortic aneurysm repair    Medical non-compliance    Volume overload    Chest pain    Dyspnea    Pancytopenia (HCC)    Acute febrile illness    Hyponatremia    Hypermagnesemia    History of atrial flutter    History of alcohol abuse    History of cocaine abuse (HCC)    Mild tricuspid regurgitation    Uremia, acute    COVID-19 virus detected    COVID-19    Hypoxia     Plan:   CXR pending  SQ heparin to be held after dose on Sunday (10/9/22) evening. Plan for HD Monday (10/10/22) in the early morning prior to surgery.     The plan of care was discussed in detail with Dr. Ardyce Hodgkins     Electronically signed by Jerzy Reyes PA-C on 10/7/2022 at 7:36 AM

## 2022-10-07 NOTE — PROGRESS NOTES
CRITICAL CARE PROGRESS NOTE      Patient:  Percy Thacker    Unit/Bed:4K-14/014-A  YOB: 1965  MRN: [id number]   PCP: Velia Sanders MD  Date of Admission: 9/28/2022  Chief Complaint: right pleural effusion    Assessment and Plan:      Acute hypoxic respiratory failure:  CXR 9/28/2022, \"Large right pleural effusion with associated compressive atelectasis or right lower lobe collapse. Mild increase groundglass opacities in the left lung are nonspecific but can suggest underlying pulmonary edema or pneumonia. \" s/p chest tube placement  s/p tPA chemical pneumolysis 10/4, (output overnight 130cc serosanguinous fluid), CT surgery to perform right thoracotomy with decortication and partial lung resection and biopsy 10/10/2022. Continue to monitor chest tube output, monitor labs and vitals. NPO after midnight 10/9. Right lung pleural effusion: pleural fluid studies indicate exudative. Doxyxycline for CPA recommended by pulm. Low dose decadron. CXR AM.   History of thoracic and aortic aneurysm: s/p endograft in thoracic aorta, CT 9/30/2022 \"The areas of hyperdensities in the mural thrombus of the distal descending aorta were not present on the prior examination of 2/26/2020. \"  ESRD: eGFR 7, anuric, dialysis M/W/F, 0.5 liters on 10/7, AV fistula LUE  Diabetes mellitus: POCT glucose this morning 147, continue to monitor. GERD: pepcid 20 mg  Tobacco abuse: patient has not asked for nicotine patch.  Consider if requested  Hyperlipidemia: lipitor 40 mg  Hypertension: monitor vitals  Depression: zoloft 100 mg   History of cocaine use    INITIAL H AND P AND ICU COURSE:  This is a 44-year-old male on hospital day 9 with a past medical history of AAA, end-stage renal disease on hemodialysis, diabetes type 2 (not insulin-requiring), hyperparathyroidism, who presented to the emergency department 9/28/2022 complaining of shortness of breath that had been going on for about a month as well  (dyspnea on exertion and orthopnea) along with leg swelling. He was found to have a large right-sided pleural effusion and admitted to stepdown for further management at that time. Chest tube placed, which returned large volume of serosanguinous fluid, bronch lavage and fluid sampling. 10/6/2022 Bronchoscopy:    Indication:  Entrapped lung  Sedation:   4 mg Versed. 100 mg Ketamine  Findings:  No airway obstruction  Chronic inflammation with pitting airways disease. Scant mucous in lower lobes. No mass identified. Samples:   RLL BAL with 120 ml lavage and 40 ml returned    CT surgery will do VATS 10/10. Past Medical History:    Past Medical History:   Diagnosis Date    AAA (abdominal aortic aneurysm)     NURIS (acute kidney injury) ([facility] 9/24/2015    Anemia associated with chronic renal failure     Arthritis     stated in hands    Cocaine abuse ([facility] 5/10/2014    Depression     Diabetes mellitus ([facility]     pt states he no longer has diabetes he has lost alot of davion. FSGS (focal segmental glomerulosclerosis) 5/23/2013    Hemodialysis patient ([facility] 10/17/2016    on hemodialysis with [facility] 1/16/2012    History of blood transfusion     Hyperlipidemia     Hyperphosphatemia 5/21/2016    Hypertension     Left renal artery stenosis ([facility] 5/22/2014    Monoclonal (M) protein disease, multiple 'M' protein     Nicotine dependence 6/16/2014    Noncompliance     Pneumonia     Psychiatric problem     PTSD (post-traumatic stress disorder)     Pt [place] from [place]    Secondary hyperparathyroidism (of renal origin)     Sleep apnea     . Family History:  Breast Cancer-related family history is not on file.   .  Social History:    Social History     Socioeconomic History    Marital status:      Spouse name: Andrea    Number of children: 2    Years of education: 12    Highest education level: Not on file   Occupational History     Employer: UNEMPLOYED   Tobacco Use    Smoking status: Some Days     Packs/day: 0.25     Years: 20.00     Pack years: 5.00     Types: Cigarettes     Start date: 10/11/1985    Smokeless tobacco: Never    Tobacco comments:     occasional   Vaping Use    Vaping Use: Never used   Substance and Sexual Activity    Alcohol use: No    Drug use: Not Currently     Comment: hx of    Sexual activity: Yes     Partners: Female   Other Topics Concern    Not on file   Social History Narrative    Not on file     Social Determinants of Health     Financial Resource Strain: Not on file   Food Insecurity: Not on file   Transportation Needs: Not on file   Physical Activity: Not on file   Stress: Not on file   Social Connections: Not on file   Intimate Partner Violence: Not on file   Housing Stability: Not on file     . ROS   The patient was seated comfortably and watching [employer]. He said that he has not been able to breathe this well in a couple of months. Feels much better and had the first bowel movement in 2 days. He has no complaints at this time. No fever or chills. Outside of chest tube, no chest pain. No wheezing.         Scheduled Meds:   minoxidil  2.5 mg Oral BID    albumin human  25 g IntraVENous Once    ketamine  100 mg IntraVENous Once    doxycycline hyclate  100 mg Oral 2 times per day    dexamethasone  0.5 mg Oral Daily    docusate sodium  100 mg Oral BID    senna  1 tablet Oral Nightly    aspirin  81 mg Oral Daily    atorvastatin  40 mg Oral Nightly    cloNIDine  0.3 mg Oral TID    doxazosin  8 mg Oral Nightly    famotidine  20 mg Oral Daily    ferrous sulfate  325 mg Oral Daily with breakfast    folic acid  6,891 mcg Oral Daily    fluticasone  1 spray Nasal Daily    isosorbide mononitrate  120 mg Oral Daily    sertraline  100 mg Oral Daily    verapamil  240 mg Oral Daily    multivitamin  1 tablet Oral Daily    sodium chloride flush  10 mL IntraVENous 2 times per day    heparin (porcine)  5,000 Units SubCUTAneous 3 times per day     Continuous Infusions: sodium chloride         PHYSICAL EXAMINATION:  T:  97.5. P:  76. RR:  19. B/P:  100/51. FiO2:  24%. O2 Sat:  96 (1L NC). I/O:  +220  Body mass index is 26.78 kg/m². GCS:   15  General:   Seated comfortable, no acute distress, CT draining serosanguinous fluid. HEENT:  normocephalic and atraumatic. No scleral icterus. PERR  Neck: supple. No Thyromegaly. Lungs: clear to auscultation, no crackles heard. No retractions  Cardiac: RRR. Holosystolic murmur in parasternal chest. No JVD. Abdomen: soft. Nontender. Extremities:  No clubbing, cyanosis, or edema x 4. Swelling in legs resolve per patient and exam.   Vasculature: capillary refill > 3 seconds. Palpable dorsalis pedis pulses. Skin:  warm and dry. Psych:  Alert and oriented x3. Affect appropriate  Neurologic:  No focal deficit. No seizures. Data: (All radiographs, tracings, PFTs, and imaging are personally viewed and interpreted unless otherwise noted). Sodium: 135  WBC: 4.9  Telemetry shows: NSR        Seen with multidisciplinary ICU team .  Meets Continued ICU Level Care Criteria:    [x] Yes   [] No - Transfer Planned to listed location:  [] HOSPITALIST CONTACTED-      Case and plan discussed with Dr. Alyse Zapata. Electronically signed by Dez Trevino MD  CRITICAL CARE SPECIALIST  Patient seen by me including key components of medical care. Case discussed with Dr. Lawana Collet.  CXR shows entrapped lung on right. Surgery planned for 10/10/22. Case discussed with resident physician. Italicized font, if present,  represents changes to the note made by me. CC time 25 minutes. Time was discontiguous. Time does not include procedure. Time does include my direct assessment of the patient and coordination of care. Time represents more than 50% of the time involved with patient care by the [address] [place] [address] team.  Electronically signed by Felipe Murphy.  Alyse Zapata MD.

## 2022-10-07 NOTE — PROGRESS NOTES
[facility] [address]  OCCUPATIONAL THERAPY MISSED TREATMENT NOTE  [facility] ICU STEPDOWN TELEMETRY 4K  4K-14/014-A      Date: 10/7/2022  Patient Name: Samuel Boland        CSN: [id number]   : 1965  (56 y.o.)  Gender: male   Referring Practitioner: Dr. Shukri Albright MD  Diagnosis: Hypoxia         REASON FOR MISSED TREATMENT: Patient Unavailable  First attempt, pt at dialysis, second attempt nursing requesting to give patient medication, will attempt again as time alllows.

## 2022-10-07 NOTE — PROGRESS NOTES
Internal Medicine Resident Progress Note    Patient:  Lopez Zafar    YOB: 1965  Unit/Bed:4-14/014-A  MRN: [id number]    Acct: [de-identified]   PCP: Jolie Fabian MD    Date of Admission: 9/28/2022      Assessment/Plan:  Pleural effusion, right  - Multifactorial; likely secondary to hemodialysis noncompliance  - S/p chest tube 09/28  - S/p chemical pneumolysis 10/04 with tPA and dornase injection  - 170 cc serosanguineous drainage in the last 24 hours  - Pleural studies indicating exudative effusion with cytology negative for malignant cells  - 10/06 bronchoscopy performed without complication by pulmonologist    Acute hypoxic respiratory failure  - Patient maintaining adequate oxygenation on room air; titrate to maintain SPO2 90 to 96%  - S/p chest tube placement 09/28  - Likely secondary to pleural effusion as well as Hx polysubstance abuse    Trapped lung, right  - S/p chest tube 09/28  - S/p chemical pneumolysis 10/04 with tPA and dornase injection  - 170 cc serosanguineous drainage in the last 24 hours  - Pleural studies indicating exudative effusion with cytology negative for malignant cells  - Bronchoscopy performed by pulmonologist on 86/04 without complication  - Patient seen and evaluated by CT surgery on 10/06 with plan for right thoracotomy with decortication and partial lung resection on 10/10/2022    Right lower lobe infiltrate  - Noted on CTA of the chest on 10/02  - Associated with exudative pleural effusion  - Chest tube in place, s/p chemical neurolysis  - Continue to monitor for output from chest tube  - Bronchoscopy performed on 77/60 without complication  - Day 9/47 doxycycline per pulmonology recommendation  - Incentive spirometry, bronchodilation per pulmonology recommendations  - Repeat imaging to assess for interval changes per pulmonology  - Pulmonology considering starting the patient on low-dose decadron  - CT surgery consulted with plan for right thoracotomy with decortication and partial lung resection on 10/10/2022     ESRD, on hemodialysis  - Patient on MWF dialysis schedule  - Continue with low phosphorus and low potassium diet  - Nephrology following  - Judicious IV fluids and diuresis per nephrology recommendations  - Strict I's and O's  - 10/07 dialysis with 0.5 L removal    Systolic heart failure with preserved ejection fraction  - Echo 9/29 increased left ventricular wall thickness with EF 60%  - Strict I's and O's, daily weights  - Judicious IV fluids and diuresis per nephrology  - Continue Imdur    Hypertrophic cardiomyopathy  - Echo 9/29 showing severe left ventricular concentric hypertrophy as well as dilation of the left atrium  - Continue aspirin, statin, isosorbide mononitrate, verapamil  - Strict I's and O's, daily weights  - Judicious IV fluids and diuresis per nephrology    Pneumothorax, right lower lobe  - Noted on CXR on 10/04  - Chest tube in place  - Advance care recommendations per pulmonology  -Continue to monitor for signs or symptoms of worsening respiratory function    Primary HTN with current hypotension  - Continue clonidine, doxazosin, and verapamil with parameters  - Patient started on minoxidil by nephrology for hypotension overnight on 10/07; patient given albumin infusion on 10/07    PTSD  - Continue sertraline, Atarax, trazodone  - Mood stable    Unspecified depressive disorder  - Continue sertraline  - Mood stable    GERD  - Continue PPI    Polysubstance abuse  - Patient counseled on substance abuse cessation    HLD  - Continue atorvastatin    Anemia of chronic disease  - Trend CBC  - Continue iron and folate supplementation    REZA  - Patient tolerating BiPAP at night  - Recommendation for outpatient follow-up for PAP titration with sleep clinic    Thoracic spondylosis  - Noted on CTA of the chest on 10/02  - PT/OT consulted; 10/05 [facility] 15    Hyperkalemia, resolved     Hyperphosphatemia, resolved  - Currently on MWF dialysis schedule  - Continue low phosphorus diet  - Patient's home phosphorus binder resumed  - Trend phosphorus and BMP     Apical pneumothorax, right, resolved  Hx Thoracic Aneurysm  Hx AAA s/p aortic stent graft 7/2018  Hx COVID-19 pneumonia  Hx atrial flutter  Hx peripheral vascular disease  Hx hypertensive emergency  - Noted      Expected discharge date: 10/11/2022    Disposition:   [x] Home  [] TCU  [] Rehab  [] Psych  [] SNF  [] [place]  [] Other-    ===================================================================      Chief Complaint: Shortness of breath    Hospital Course: This is a 72-year-old -American male who presented to [address] [place] [address] on 09/28/2022 with worsening shortness of breath at rest and with exertion. CXR showed large right pleural effusion and patient required PAP therapy for adequate oxygenation. Patient was admitted and chest tube placed on 09/28 with 2 L of serosanguineous fluid drainage. Dialysis also performed at 09/28 with 2 L of fluid. Repeat dialysis performed on 09/29 with albumin administration. Repeat dialysis performed on 09/30/2022. Pleural fluid analysis was consistent with exudative effusion with negative cytology. Repeat CTA of the chest showed improvement in the right-sided pleural effusion, but showed evidence of a right apical pneumothorax which resolved on subsequent imaging. Repeat dialysis performed on 10/03 with 2 L removed. Dornase and alteplase administered through chest tube on 10/04 by intensivist.  Hemodialysis performed on 10/05 with 2 L of fluid removed. Bronchoscopy performed on 99/40 without complication. There was evidence of chronic inflammation with pitting airway disease with no mass identified. Subjective (past 24 hours):   Patient seen and evaluated after dialysis. He denies chest pain, fever, chills, nausea, vomiting, and diarrhea. 170 cc drainage from chest tube, serosanguineous in nature.   Patient states that he feels stronger and demonstrates his respiratory effort at bedside; he verbalizes understanding that the efforts of the critical care team as well as pulmonology are to prevent readmission and is agreeable to staying through the weekend. Denies any additional acute symptoms or concerns. ROS: reviewed complete ROS unchanged unless otherwise stated in hospital course/subjective portion. Medications:  Reviewed    Infusion Medications    sodium chloride       Scheduled Medications    minoxidil  2.5 mg Oral BID    albumin human  25 g IntraVENous Once    ketamine  100 mg IntraVENous Once    doxycycline hyclate  100 mg Oral 2 times per day    dexamethasone  0.5 mg Oral Daily    docusate sodium  100 mg Oral BID    senna  1 tablet Oral Nightly    aspirin  81 mg Oral Daily    atorvastatin  40 mg Oral Nightly    cloNIDine  0.3 mg Oral TID    doxazosin  8 mg Oral Nightly    famotidine  20 mg Oral Daily    ferrous sulfate  325 mg Oral Daily with breakfast    folic acid  2,169 mcg Oral Daily    fluticasone  1 spray Nasal Daily    isosorbide mononitrate  120 mg Oral Daily    sertraline  100 mg Oral Daily    verapamil  240 mg Oral Daily    multivitamin  1 tablet Oral Daily    sodium chloride flush  10 mL IntraVENous 2 times per day    heparin (porcine)  5,000 Units SubCUTAneous 3 times per day     PRN Meds: hydrOXYzine HCl, traZODone, sodium chloride flush, sodium chloride, ondansetron **OR** ondansetron, polyethylene glycol, acetaminophen, HYDROcodone 5 mg - acetaminophen **OR** HYDROcodone 5 mg - acetaminophen, morphine        Intake/Output Summary (Last 24 hours) at 10/7/2022 0901  Last data filed at 10/7/2022 0738  Gross per 24 hour   Intake 530 ml   Output 170 ml   Net 360 ml       Exam:  BP (!) 100/51   Pulse 77   Temp 97.5 °F (36.4 °C)   Resp 19   Ht 6' 3\" (1.905 m)   Wt 214 lb 4.6 oz (97.2 kg)   SpO2 96%   BMI 26.78 kg/m²     General: No distress, appears stated age. Eyes:  PERRL. Conjunctivae/corneas clear.   HENT: Head normal appearing. Nares normal. Oral mucosa moist.  Hearing intact. Neck: Supple, with full range of motion. Trachea midline. No gross JVD appreciated. Respiratory: Normal respiratory effort. Diffuse crackles heard in the right lobe. No wheezes or rhonchi. Chest tube in place draining serosanguineous fluid. Cardiovascular: Normal rate, regular rhythm with normal S1/S2 without murmurs. No lower extremity edema. Abdomen: Soft, non-tender, non-distended with normal bowel sounds. Musculoskeletal: No joint swelling or tenderness. Normal tone. No abnormal movements. Skin: Warm and dry. No rashes or lesions. Neurologic:  No focal sensory/motor deficits in the upper or lower extremities. Cranial nerves:  grossly non-focal 2-12. Psychiatric: Alert and oriented, normal insight and thought content. Capillary Refill: Brisk,< 3 seconds. Peripheral Pulses: +2 palpable, equal bilaterally. Labs:   Recent Labs     10/05/22  0320 10/06/22  0415 10/07/22  0434   WBC 4.0* 3.7* 4.9   HGB 9.8* 10.5* 9.9*   HCT 32.1* 34.2* 31.8*   * 108* 107*     Recent Labs     10/05/22  0320 10/05/22  1401 10/06/22  0415 10/07/22  0434     --  136 135   K 5.7* 4.3 4.7 5.4*     --  101 100   CO2 22*  --  23 25   BUN 62*  --  37* 61*   CREATININE 9.6*  --  6.9* 9.4*   CALCIUM 9.1  --  9.0 9.2   PHOS 4.7  --   --   --      No results for input(s): AST, ALT, BILIDIR, BILITOT, ALKPHOS in the last 72 hours. No results for input(s): INR in the last 72 hours. No results for input(s): Dora Cori in the last 72 hours. No results for input(s): PROCAL in the last 72 hours.    Lab Results   Component Value Date/Time    NITRU NEGATIVE 04/03/2018 07:45 PM    WBCUA 2-4 04/03/2018 07:45 PM    BACTERIA NONE 04/03/2018 07:45 PM    RBCUA 5-10 04/03/2018 07:45 PM    BLOODU SMALL 04/03/2018 07:45 PM    SPECGRAV 1.014 03/07/2018 09:10 PM    GLUCOSEU NEGATIVE 04/03/2018 07:45 PM       Radiology (48 hours):  XR CHEST PORTABLE    Result Date: 10/5/2022  Small pneumothorax at the right lung base. Chest tube tip is at the right lung base, grossly unchanged. Small right pleural effusion, decreased. Right lung base consolidation, increased, suspicious for atelectasis or pneumonia. Cardiomegaly. This document has been electronically signed by: Modesto Villalba MD on 10/05/2022 03:41 AM        DVT prophylaxis:    [] Lovenox  [] SCDs  [x] SQ Heparin  [] Encourage ambulation   [] Already on Anticoagulation       Diet: ADULT DIET; Regular; 5 carb choices (75 gm/meal); Low Fat/Low Chol/High Fiber/2 gm Na; Low Sodium (2 gm); Low Potassium (Less than 3000 mg/day);  Low Phosphorus (Less than 1000 mg)  ADULT ORAL NUTRITION SUPPLEMENT; Breakfast, Lunch; Renal Oral Supplement  Code Status: Full Code  PT/OT: Consulted      Electronically signed by Lisa Griffith MD on 10/7/2022 at 9:01 AM    Case was discussed with Attending, Dr. Michael Camilo

## 2022-10-07 NOTE — PROGRESS NOTES
[facility]   Nephrology progress note  10/7/2022, 8:11 AM      Pt Name:    Laurie Burkett  MRN:     [id number]     YOB: 1965  Admit Date:    9/28/2022  9:56 AM    Chief Complaint: Nephrology following for ESRD. Subjective:  Patient was seen and examined during hemodialysis. BP dropped to 89/42. Patient asymptomatic. UF turned down to 2 liters. He received all BP meds prior to HD. Objective:  24HR INTAKE/OUTPUT:    Intake/Output Summary (Last 24 hours) at 10/7/2022 0811  Last data filed at 10/7/2022 0738  Gross per 24 hour   Intake 770 ml   Output 170 ml   Net 600 ml         I/O last 3 completed shifts:   In: 530 [P.O.:530]  Out: 320 [Chest Tube:320]  I/O this shift:  In: 240 [P.O.:240]  Out: -    Admission weight: 250 lb (113.4 kg)  Wt Readings from Last 3 Encounters:   10/07/22 207 lb (93.9 kg)   09/13/21 250 lb (113.4 kg)   07/26/21 240 lb (108.9 kg)        Vitals :   Vitals:    10/06/22 2013 10/06/22 2327 10/07/22 0323 10/07/22 0644   BP: 131/69 (!) 110/58 (!) 117/46 (!) 126/57   Pulse: 79 78 76 76   Resp: 18 18 16 18   Temp: 98.8 °F (37.1 °C) 97.5 °F (36.4 °C) 98.7 °F (37.1 °C) 98.1 °F (36.7 °C)   TempSrc: Oral Oral Oral Oral   SpO2: 98% 99% 99% 99%   Weight:   207 lb (93.9 kg)    Height:           Physical examination  General Appearance: alert and cooperative with exam, appears comfortable, no distress  Mouth/Throat: Oral mucosa moist  Neck: No JVD  Lungs: diminished, right sided chest tube  Heart:  S1, S2 heard  GI: soft, non-tender  Extremities: improved LE edema, left arm AV fistula    Medications:  Infusion:    sodium chloride       Meds:    minoxidil  2.5 mg Oral BID    ketamine  100 mg IntraVENous Once    doxycycline hyclate  100 mg Oral 2 times per day    dexamethasone  0.5 mg Oral Daily    docusate sodium  100 mg Oral BID    senna  1 tablet Oral Nightly    aspirin  81 mg Oral Daily    atorvastatin  40 mg Oral Nightly    cloNIDine  0.3 mg Oral TID    doxazosin  8 mg Oral Nightly    famotidine  20 mg Oral Daily    ferrous sulfate  325 mg Oral Daily with breakfast    folic acid  7,644 mcg Oral Daily    fluticasone  1 spray Nasal Daily    isosorbide mononitrate  120 mg Oral Daily    sertraline  100 mg Oral Daily    verapamil  240 mg Oral Daily    multivitamin  1 tablet Oral Daily    sodium chloride flush  10 mL IntraVENous 2 times per day    heparin (porcine)  5,000 Units SubCUTAneous 3 times per day     Meds prn: hydrOXYzine HCl, traZODone, sodium chloride flush, sodium chloride, ondansetron **OR** ondansetron, polyethylene glycol, acetaminophen, HYDROcodone 5 mg - acetaminophen **OR** HYDROcodone 5 mg - acetaminophen, morphine     Lab Data :  CBC:   Recent Labs     10/05/22  0320 10/06/22  0415 10/07/22  0434   WBC 4.0* 3.7* 4.9   HGB 9.8* 10.5* 9.9*   HCT 32.1* 34.2* 31.8*   * 108* 107*     CMP:  Recent Labs     10/05/22  0320 10/05/22  1401 10/06/22  0415 10/07/22  0434     --  136 135   K 5.7* 4.3 4.7 5.4*     --  101 100   CO2 22*  --  23 25   BUN 62*  --  37* 61*   CREATININE 9.6*  --  6.9* 9.4*   GLUCOSE 77  --  77 83   CALCIUM 9.1  --  9.0 9.2   MG 2.5*  --   --   --    PHOS 4.7  --   --   --      Hepatic:   No results for input(s): LABALBU, AST, ALT, ALB, BILITOT, ALKPHOS in the last 72 hours. Assessment and Plan:    1. ESRD on HD TTS  -dialyzing MWF while inpatient  -HD today, reduce UF to 2 liters due to hypotension. Will give IV albumin if no improvement  -need to hold BP meds prior to HD    2. Hyperkalemia : HD on 2K bath, low potassium diet  3. HTN: running lower after adding Minoxidil, reduce to 2.5 mg bid and hold BP meds prior to HD  4. Loculated pleural effusion: will have thoracotomy on Monday  5. Anemia in CKD  6. DM  7. Secondary hyperparathyroidism    D/W patient and HD RN    Tray Cogan, DO  [facility]    This report has been created using voice recognition software.  It may contain minor errors which are inherent in voice recognition technology

## 2022-10-07 NOTE — PLAN OF CARE
Problem: Discharge Planning  Goal: Discharge to home or other facility with appropriate resources  Outcome: Progressing  Flowsheets (Taken 10/6/2022 2229)  Discharge to home or other facility with appropriate resources:   Identify barriers to discharge with patient and caregiver   Arrange for needed discharge resources and transportation as appropriate   Refer to discharge planning if patient needs post-hospital services based on physician order or complex needs related to functional status, cognitive ability or social support system   Identify discharge learning needs (meds, wound care, etc)  Note: Patient plans to return home with wife at discharge and no needs voiced at this time. Patient working with social work for discharge planning. Problem: Pain  Goal: Verbalizes/displays adequate comfort level or baseline comfort level  Outcome: Progressing  Flowsheets (Taken 10/6/2022 2229)  Verbalizes/displays adequate comfort level or baseline comfort level:   Encourage patient to monitor pain and request assistance   Assess pain using appropriate pain scale   Administer analgesics based on type and severity of pain and evaluate response   Implement non-pharmacological measures as appropriate and evaluate response  Note: Pain Assessment: 0-10  Pain Level: 0   Patient's Stated Pain Goal: 0 - No pain   Is pain goal met at this time? Yes     Non-Pharmaceutical Pain Intervention(s): Repositioned, Rest       Problem: Safety - Adult  Goal: Free from fall injury  Outcome: Progressing  Flowsheets (Taken 10/6/2022 2229)  Free From Fall Injury:   Instruct family/caregiver on patient safety   Based on caregiver fall risk screen, instruct family/caregiver to ask for assistance with transferring infant if caregiver noted to have fall risk factors  Note: Patient alert and oriented x4. Bed alarmed armed. Bed wheels locked. Bedside table in reach. Patient up with assistance when ambulating.  Patient verbalizes and demonstrates the use of the call light. Hourly rounding being completed. Problem: ABCDS Injury Assessment  Goal: Absence of physical injury  Outcome: Progressing  Flowsheets (Taken 10/6/2022 2229)  Absence of Physical Injury: Implement safety measures based on patient assessment  Note: Free from injury     Problem: Respiratory - Adult  Goal: Achieves optimal ventilation and oxygenation  Outcome: Progressing  Flowsheets (Taken 10/6/2022 2229)  Achieves optimal ventilation and oxygenation:   Assess for changes in respiratory status   Assess for changes in mentation and behavior   Position to facilitate oxygenation and minimize respiratory effort   Oxygen supplementation based on oxygen saturation or arterial blood gases   Respiratory therapy support as indicated   Encourage broncho-pulmonary hygiene including cough, deep breathe, incentive spirometry  Note: Patient remains on supplemental O2. Will wean as tolerable. Plan for thoracotomy 10/10. Chest tube remains intact. Problem: Cardiovascular - Adult  Goal: Maintains optimal cardiac output and hemodynamic stability  Outcome: Progressing  Flowsheets (Taken 10/6/2022 2229)  Maintains optimal cardiac output and hemodynamic stability: Monitor blood pressure and heart rate  Note: Stable vitals and labs      Problem: Skin/Tissue Integrity - Adult  Goal: Skin integrity remains intact  Outcome: Progressing  Flowsheets (Taken 10/6/2022 2228)  Skin Integrity Remains Intact:   Monitor for areas of redness and/or skin breakdown   Assess vascular access sites hourly   Every 4-6 hours minimum: Change oxygen saturation probe site  Note: Patient turns self independently, assistance provided when needed. Patient educated on the importance of turning self frequently to prevent breakdown. No new skin changes noted thus far this shift. Will continue to monitor.         Problem: Infection - Adult  Goal: Absence of infection at discharge  Outcome: Progressing  Flowsheets (Taken 10/6/2022 2229)  Absence of infection at discharge:   Assess and monitor for signs and symptoms of infection   Monitor lab/diagnostic results   Monitor all insertion sites i.e., indwelling lines, tubes and drains  Note: No s/s of infection      Problem: Metabolic/Fluid and Electrolytes - Adult  Goal: Electrolytes maintained within normal limits  Outcome: Progressing  Flowsheets (Taken 10/6/2022 2229)  Electrolytes maintained within normal limits:   Monitor labs and assess patient for signs and symptoms of electrolyte imbalances   Administer electrolyte replacement as ordered  Note: Monitoring labs closely. Problem: Hematologic - Adult  Goal: Maintains hematologic stability  Outcome: Progressing  Flowsheets (Taken 10/6/2022 2229)  Maintains hematologic stability: Assess for signs and symptoms of bleeding or hemorrhage  Note: No signs of bleeding noted     Problem: Neurosensory - Adult  Goal: Achieves maximal functionality and self care  Outcome: Progressing  Flowsheets (Taken 10/6/2022 2229)  Achieves maximal functionality and self care: Monitor swallowing and airway patency with patient fatigue and changes in neurological status  Note: At baseline neuro function. Problem: Skin/Tissue Integrity  Goal: Absence of new skin breakdown  Description: 1. Monitor for areas of redness and/or skin breakdown  2. Assess vascular access sites hourly  3. Every 4-6 hours minimum:  Change oxygen saturation probe site  4. Every 4-6 hours:  If on nasal continuous positive airway pressure, respiratory therapy assess nares and determine need for appliance change or resting period. Outcome: Progressing  Note: Patient turns self independently, assistance provided when needed. Patient educated on the importance of turning self frequently to prevent breakdown. No new skin changes noted thus far this shift. Will continue to monitor.         Problem: Chronic Conditions and Co-morbidities  Goal: Patient's chronic conditions and co-morbidity symptoms are monitored and maintained or improved  Outcome: Progressing  Flowsheets (Taken 10/6/2022 2229)  Care Plan - Patient's Chronic Conditions and Co-Morbidity Symptoms are Monitored and Maintained or Improved:   Monitor and assess patient's chronic conditions and comorbid symptoms for stability, deterioration, or improvement   Collaborate with multidisciplinary team to address chronic and comorbid conditions and prevent exacerbation or deterioration   Update acute care plan with appropriate goals if chronic or comorbid symptoms are exacerbated and prevent overall improvement and discharge     Problem: Nutrition Deficit:  Goal: Optimize nutritional status  10/6/2022 2229 by Jamie Moon RN  Outcome: Progressing     Care plan reviewed with patient and family. Patient and family verbalize understanding of the plan of care and contribute to goal setting.

## 2022-10-08 ENCOUNTER — APPOINTMENT (OUTPATIENT)
Dept: GENERAL RADIOLOGY | Age: 57
DRG: 163 | End: 2022-10-08
Payer: MEDICARE

## 2022-10-08 LAB
ANION GAP SERPL CALCULATED.3IONS-SCNC: 11 MEQ/L (ref 8–16)
BUN BLDV-MCNC: 34 MG/DL (ref 7–22)
CALCIUM SERPL-MCNC: 8.8 MG/DL (ref 8.5–10.5)
CHLORIDE BLD-SCNC: 101 MEQ/L (ref 98–111)
CO2: 25 MEQ/L (ref 23–33)
CREAT SERPL-MCNC: 6 MG/DL (ref 0.4–1.2)
ERYTHROCYTE [DISTWIDTH] IN BLOOD BY AUTOMATED COUNT: 13.1 % (ref 11.5–14.5)
ERYTHROCYTE [DISTWIDTH] IN BLOOD BY AUTOMATED COUNT: 48.6 FL (ref 35–45)
GFR SERPL CREATININE-BSD FRML MDRD: 12 ML/MIN/1.73M2
GLUCOSE BLD-MCNC: 87 MG/DL (ref 70–108)
GRAM STAIN RESULT: ABNORMAL
HCT VFR BLD CALC: 33.4 % (ref 42–52)
HEMOGLOBIN: 10.4 GM/DL (ref 14–18)
MCH RBC QN AUTO: 31.4 PG (ref 26–33)
MCHC RBC AUTO-ENTMCNC: 31.1 GM/DL (ref 32.2–35.5)
MCV RBC AUTO: 100.9 FL (ref 80–94)
ORGANISM: ABNORMAL
PLATELET # BLD: 125 THOU/MM3 (ref 130–400)
PMV BLD AUTO: 10.2 FL (ref 9.4–12.4)
POTASSIUM SERPL-SCNC: 4.5 MEQ/L (ref 3.5–5.2)
RBC # BLD: 3.31 MILL/MM3 (ref 4.7–6.1)
RESPIRATORY CULTURE: ABNORMAL
SODIUM BLD-SCNC: 137 MEQ/L (ref 135–145)
WBC # BLD: 4.8 THOU/MM3 (ref 4.8–10.8)

## 2022-10-08 PROCEDURE — 6370000000 HC RX 637 (ALT 250 FOR IP): Performed by: PHYSICIAN ASSISTANT

## 2022-10-08 PROCEDURE — 36415 COLL VENOUS BLD VENIPUNCTURE: CPT

## 2022-10-08 PROCEDURE — 85027 COMPLETE CBC AUTOMATED: CPT

## 2022-10-08 PROCEDURE — 6370000000 HC RX 637 (ALT 250 FOR IP)

## 2022-10-08 PROCEDURE — 6360000002 HC RX W HCPCS: Performed by: PHYSICIAN ASSISTANT

## 2022-10-08 PROCEDURE — 80048 BASIC METABOLIC PNL TOTAL CA: CPT

## 2022-10-08 PROCEDURE — 99232 SBSQ HOSP IP/OBS MODERATE 35: CPT | Performed by: INTERNAL MEDICINE

## 2022-10-08 PROCEDURE — 71045 X-RAY EXAM CHEST 1 VIEW: CPT

## 2022-10-08 PROCEDURE — 6370000000 HC RX 637 (ALT 250 FOR IP): Performed by: NURSE PRACTITIONER

## 2022-10-08 PROCEDURE — 2060000000 HC ICU INTERMEDIATE R&B

## 2022-10-08 PROCEDURE — 2580000003 HC RX 258: Performed by: PHYSICIAN ASSISTANT

## 2022-10-08 PROCEDURE — 6370000000 HC RX 637 (ALT 250 FOR IP): Performed by: INTERNAL MEDICINE

## 2022-10-08 PROCEDURE — 6360000002 HC RX W HCPCS: Performed by: INTERNAL MEDICINE

## 2022-10-08 PROCEDURE — 6370000000 HC RX 637 (ALT 250 FOR IP): Performed by: STUDENT IN AN ORGANIZED HEALTH CARE EDUCATION/TRAINING PROGRAM

## 2022-10-08 RX ADMIN — FERROUS SULFATE TAB 325 MG (65 MG ELEMENTAL FE) 325 MG: 325 (65 FE) TAB at 08:20

## 2022-10-08 RX ADMIN — FOLIC ACID 1000 MCG: 1 TABLET ORAL at 08:17

## 2022-10-08 RX ADMIN — DEXAMETHASONE 0.5 MG: 0.5 TABLET ORAL at 08:18

## 2022-10-08 RX ADMIN — VERAPAMIL HYDROCHLORIDE 240 MG: 240 TABLET, FILM COATED, EXTENDED RELEASE ORAL at 08:17

## 2022-10-08 RX ADMIN — ATORVASTATIN CALCIUM 40 MG: 40 TABLET, FILM COATED ORAL at 21:18

## 2022-10-08 RX ADMIN — MINOXIDIL 2.5 MG: 2.5 TABLET ORAL at 21:18

## 2022-10-08 RX ADMIN — Medication 1 TABLET: at 08:20

## 2022-10-08 RX ADMIN — SODIUM CHLORIDE, PRESERVATIVE FREE 10 ML: 5 INJECTION INTRAVENOUS at 08:13

## 2022-10-08 RX ADMIN — MINOXIDIL 2.5 MG: 2.5 TABLET ORAL at 08:18

## 2022-10-08 RX ADMIN — FAMOTIDINE 20 MG: 20 TABLET ORAL at 08:12

## 2022-10-08 RX ADMIN — DOXAZOSIN 8 MG: 4 TABLET ORAL at 21:18

## 2022-10-08 RX ADMIN — HEPARIN SODIUM 5000 UNITS: 5000 INJECTION INTRAVENOUS; SUBCUTANEOUS at 21:20

## 2022-10-08 RX ADMIN — HEPARIN SODIUM 5000 UNITS: 5000 INJECTION INTRAVENOUS; SUBCUTANEOUS at 14:22

## 2022-10-08 RX ADMIN — DOCUSATE SODIUM 100 MG: 100 CAPSULE, LIQUID FILLED ORAL at 21:19

## 2022-10-08 RX ADMIN — DOXYCYCLINE HYCLATE 100 MG: 100 TABLET, COATED ORAL at 08:16

## 2022-10-08 RX ADMIN — DOCUSATE SODIUM 100 MG: 100 CAPSULE, LIQUID FILLED ORAL at 08:12

## 2022-10-08 RX ADMIN — DOXYCYCLINE HYCLATE 100 MG: 100 TABLET, COATED ORAL at 21:19

## 2022-10-08 RX ADMIN — CLONIDINE HYDROCHLORIDE 0.3 MG: 0.2 TABLET ORAL at 08:16

## 2022-10-08 RX ADMIN — FLUTICASONE PROPIONATE 1 SPRAY: 50 SPRAY, METERED NASAL at 08:10

## 2022-10-08 RX ADMIN — CLONIDINE HYDROCHLORIDE 0.3 MG: 0.2 TABLET ORAL at 14:21

## 2022-10-08 RX ADMIN — SERTRALINE 100 MG: 100 TABLET, FILM COATED ORAL at 08:19

## 2022-10-08 RX ADMIN — SENNOSIDES 8.6 MG: 8.6 TABLET, COATED ORAL at 21:19

## 2022-10-08 RX ADMIN — CLONIDINE HYDROCHLORIDE 0.3 MG: 0.2 TABLET ORAL at 21:18

## 2022-10-08 RX ADMIN — ASPIRIN 81 MG: 81 TABLET, COATED ORAL at 08:12

## 2022-10-08 RX ADMIN — HEPARIN SODIUM 5000 UNITS: 5000 INJECTION INTRAVENOUS; SUBCUTANEOUS at 05:54

## 2022-10-08 RX ADMIN — SODIUM CHLORIDE, PRESERVATIVE FREE 10 ML: 5 INJECTION INTRAVENOUS at 21:19

## 2022-10-08 RX ADMIN — ISOSORBIDE MONONITRATE 120 MG: 60 TABLET, EXTENDED RELEASE ORAL at 08:20

## 2022-10-08 ASSESSMENT — PAIN SCALES - WONG BAKER

## 2022-10-08 ASSESSMENT — PAIN SCALES - GENERAL
PAINLEVEL_OUTOF10: 0
PAINLEVEL_OUTOF10: 0

## 2022-10-08 NOTE — PROGRESS NOTES
Cardiovascular Surgery Progress Note    Comfortable on RA  Stable  Plan decortication Monday with Dr. Paul Jessica

## 2022-10-08 NOTE — FLOWSHEET NOTE
10/08/22 1029   Safe Environment   Safety Measures Other (comment)  (virtual safety round complete)   Virtual Nurse introduced, pt up in chair, call light in reach, denies needs at this time.

## 2022-10-08 NOTE — PLAN OF CARE
Problem: Discharge Planning  Goal: Discharge to home or other facility with appropriate resources  Outcome: Progressing  Flowsheets (Taken 10/8/2022 1342)  Discharge to home or other facility with appropriate resources:   Identify barriers to discharge with patient and caregiver   Identify discharge learning needs (meds, wound care, etc)   Refer to discharge planning if patient needs post-hospital services based on physician order or complex needs related to functional status, cognitive ability or social support system   Arrange for needed discharge resources and transportation as appropriate     Problem: Pain  Goal: Verbalizes/displays adequate comfort level or baseline comfort level  Outcome: Progressing  Flowsheets (Taken 10/8/2022 1342)  Verbalizes/displays adequate comfort level or baseline comfort level:   Encourage patient to monitor pain and request assistance   Assess pain using appropriate pain scale   Implement non-pharmacological measures as appropriate and evaluate response  Note: Pain goal zero      Problem: Safety - Adult  Goal: Free from fall injury  Outcome: Progressing  Flowsheets (Taken 10/8/2022 1342)  Free From Fall Injury:   Instruct family/caregiver on patient safety   Based on caregiver fall risk screen, instruct family/caregiver to ask for assistance with transferring infant if caregiver noted to have fall risk factors  Note: Hourly rounding, call light wtihin reach, chair and bed alarm in use, 1 assist with ambulation      Problem: ABCDS Injury Assessment  Goal: Absence of physical injury  Outcome: Progressing  Flowsheets (Taken 10/8/2022 1342)  Absence of Physical Injury: Implement safety measures based on patient assessment  Note: Hourly rounding, call light within reach, bed and chair alarm in use, 1 assist with ambulation      Problem: Respiratory - Adult  Goal: Achieves optimal ventilation and oxygenation  10/8/2022 1342 by Guillaume Chowdhury RN  Outcome: Progressing  Flowsheets (Taken 10/8/2022 1342)  Achieves optimal ventilation and oxygenation:   Assess for changes in respiratory status   Position to facilitate oxygenation and minimize respiratory effort   Assess and instruct to report shortness of breath or any respiratory difficulty   Encourage broncho-pulmonary hygiene including cough, deep breathe, incentive spirometry   Assess for changes in mentation and behavior     Problem: Cardiovascular - Adult  Goal: Maintains optimal cardiac output and hemodynamic stability  Outcome: Progressing  Flowsheets (Taken 10/8/2022 1342)  Maintains optimal cardiac output and hemodynamic stability:   Monitor blood pressure and heart rate   Monitor urine output and notify Licensed Independent Practitioner for values outside of normal range   Assess for signs of decreased cardiac output     Problem: Cardiovascular - Adult  Goal: Absence of cardiac dysrhythmias or at baseline  Recent Flowsheet Documentation  Taken 10/8/2022 1342 by Eva Alvarez RN  Absence of cardiac dysrhythmias or at baseline:   Monitor cardiac rate and rhythm   Administer antiarrhythmia medication and electrolyte replacement as ordered   Assess for signs of decreased cardiac output     Problem: Cardiovascular - Adult  Goal: Absence of cardiac dysrhythmias or at baseline  Outcome: Progressing  Flowsheets (Taken 10/8/2022 1342)  Absence of cardiac dysrhythmias or at baseline:   Monitor cardiac rate and rhythm   Administer antiarrhythmia medication and electrolyte replacement as ordered   Assess for signs of decreased cardiac output     Problem: Skin/Tissue Integrity - Adult  Goal: Skin integrity remains intact  Outcome: Progressing  Flowsheets (Taken 10/8/2022 1342)  Skin Integrity Remains Intact: Monitor for areas of redness and/or skin breakdown  Note: Monitor for new skin breakdown q4 hr     Problem: Skin/Tissue Integrity - Adult  Goal: Incisions, wounds, or drain sites healing without S/S of infection  Outcome: Progressing  Flowsheets (Taken 10/8/2022 1342)  Incisions, Wounds, or Drain Sites Healing Without Sign and Symptoms of Infection:   ADMISSION and DAILY: Assess and document risk factors for pressure ulcer development   Initiate pressure ulcer prevention bundle as indicated  Note: Monitor q4 hr      Problem: Skin/Tissue Integrity - Adult  Goal: Oral mucous membranes remain intact  Outcome: Progressing  Flowsheets (Taken 10/8/2022 1342)  Oral Mucous Membranes Remain Intact:   Assess oral mucosa and hygiene practices   Implement preventative oral hygiene regimen   Implement oral medicated treatments as ordered     Problem: Infection - Adult  Goal: Absence of infection during hospitalization  Recent Flowsheet Documentation  Taken 10/8/2022 1342 by Yolis Kern RN  Absence of infection during hospitalization:   Assess and monitor for signs and symptoms of infection   Monitor lab/diagnostic results   Monitor all insertion sites i.e., indwelling lines, tubes and drains   Administer medications as ordered   Instruct and encourage patient and family to use good hand hygiene technique     Problem: Infection - Adult  Goal: Absence of infection at discharge  Outcome: Progressing  Flowsheets (Taken 10/8/2022 1342)  Absence of infection at discharge:   Assess and monitor for signs and symptoms of infection   Monitor lab/diagnostic results   Monitor all insertion sites i.e., indwelling lines, tubes and drains   Instruct and encourage patient and family to use good hand hygiene technique   Administer medications as ordered     Problem: Infection - Adult  Goal: Absence of infection during hospitalization  Outcome: Progressing  Flowsheets (Taken 10/8/2022 1342)  Absence of infection during hospitalization:   Assess and monitor for signs and symptoms of infection   Monitor lab/diagnostic results   Monitor all insertion sites i.e., indwelling lines, tubes and drains   Administer medications as ordered   Instruct and encourage patient and family to use good hand hygiene technique     Problem: Metabolic/Fluid and Electrolytes - Adult  Goal: Electrolytes maintained within normal limits  Outcome: Progressing  Flowsheets (Taken 10/8/2022 1342)  Electrolytes maintained within normal limits:   Monitor labs and assess patient for signs and symptoms of electrolyte imbalances   Monitor response to electrolyte replacements, including repeat lab results as appropriate   Instruct patient on fluid and nutrition restrictions as appropriate   Administer electrolyte replacement as ordered   Fluid restriction as ordered     Problem: Metabolic/Fluid and Electrolytes - Adult  Goal: Hemodynamic stability and optimal renal function maintained  Outcome: Progressing  Flowsheets (Taken 10/8/2022 1342)  Hemodynamic stability and optimal renal function maintained:   Monitor labs and assess for signs and symptoms of volume excess or deficit   Monitor intake, output and patient weight   Encourage oral intake as appropriate   Instruct patient on fluid and nutrition restrictions as appropriate   Monitor response to interventions for patient's volume status, including labs, urine output, blood pressure (other measures as available)     Problem: Hematologic - Adult  Goal: Maintains hematologic stability  Outcome: Progressing  Flowsheets (Taken 10/8/2022 1342)  Maintains hematologic stability:   Assess for signs and symptoms of bleeding or hemorrhage   Monitor labs for bleeding or clotting disorders     Problem: Neurosensory - Adult  Goal: Achieves maximal functionality and self care  Outcome: Progressing  Flowsheets (Taken 10/8/2022 1342)  Achieves maximal functionality and self care:   Monitor swallowing and airway patency with patient fatigue and changes in neurological status   Encourage and assist patient to increase activity and self care with guidance from physical therapy/occupational therapy     Problem: Neurosensory - Adult  Goal: Achieves stable or improved neurological status  Outcome: Progressing  Flowsheets (Taken 10/8/2022 1342)  Achieves stable or improved neurological status:   Assess for and report changes in neurological status   Maintain blood pressure and fluid volume within ordered parameters to optimize cerebral perfusion and minimize risk of hemorrhage   Monitor temperature, glucose, and sodium.  Initiate appropriate interventions as ordered     Problem: Musculoskeletal - Adult  Goal: Return mobility to safest level of function  Outcome: Progressing  Flowsheets (Taken 10/8/2022 1342)  Return Mobility to Safest Level of Function:   Assess patient stability and activity tolerance for standing, transferring and ambulating with or without assistive devices   Assist with transfers and ambulation using safe patient handling equipment as needed   Ensure adequate protection for wounds/incisions during mobilization   Instruct patient/family in ordered activity level     Problem: Musculoskeletal - Adult  Goal: Maintain proper alignment of affected body part  Outcome: Progressing  Flowsheets (Taken 10/8/2022 1342)  Maintain proper alignment of affected body part:   Support and protect limb and body alignment per provider's orders   Instruct and reinforce with patient and family use of appropriate assistive device and precautions (e.g. spinal or hip dislocation precautions)     Problem: Musculoskeletal - Adult  Goal: Return ADL status to a safe level of function  Outcome: Progressing  Flowsheets (Taken 10/8/2022 1342)  Return ADL Status to a Safe Level of Function:   Administer medication as ordered   Assess activities of daily living deficits and provide assistive devices as needed   Assist and instruct patient to increase activity and self care as tolerated     Problem: Gastrointestinal - Adult  Goal: Maintains or returns to baseline bowel function  Outcome: Progressing  Flowsheets (Taken 10/8/2022 1342)  Maintains or returns to baseline bowel function:   Assess bowel function Encourage oral fluids to ensure adequate hydration   Administer ordered medications as needed   Encourage mobilization and activity   Nutrition consult to assist patient with appropriate food choices     Problem: Gastrointestinal - Adult  Goal: Maintains adequate nutritional intake  Outcome: Progressing  Flowsheets (Taken 10/8/2022 1342)  Maintains adequate nutritional intake:   Monitor percentage of each meal consumed   Monitor intake and output, weight and lab values   Identify factors contributing to decreased intake, treat as appropriate   Assist with meals as needed     Problem: Genitourinary - Adult  Goal: Absence of urinary retention  Outcome: Progressing  Flowsheets (Taken 10/8/2022 1342)  Absence of urinary retention:   Assess patients ability to void and empty bladder   Monitor intake/output and perform bladder scan as needed     Problem: Skin/Tissue Integrity  Goal: Absence of new skin breakdown  Description: 1. Monitor for areas of redness and/or skin breakdown  2. Assess vascular access sites hourly  3. Every 4-6 hours minimum:  Change oxygen saturation probe site  4. Every 4-6 hours:  If on nasal continuous positive airway pressure, respiratory therapy assess nares and determine need for appliance change or resting period.   Outcome: Progressing  Note: Monitor q4 hr, encourage q2hr while in bed and chair     Problem: Chronic Conditions and Co-morbidities  Goal: Patient's chronic conditions and co-morbidity symptoms are monitored and maintained or improved  Outcome: Progressing  Flowsheets (Taken 10/8/2022 1342)  Care Plan - Patient's Chronic Conditions and Co-Morbidity Symptoms are Monitored and Maintained or Improved:   Monitor and assess patient's chronic conditions and comorbid symptoms for stability, deterioration, or improvement   Collaborate with multidisciplinary team to address chronic and comorbid conditions and prevent exacerbation or deterioration   Update acute care plan with

## 2022-10-08 NOTE — PLAN OF CARE
Problem: Respiratory - Adult  Goal: Achieves optimal ventilation and oxygenation  10/7/2022 2894 by Delfino Aguillon RN  Outcome: Progressing  Patient remains room air at this time with oxygen above 90%. Lung sounds diminished. Educated pt to continue using his I.S.   10/7/2022 1315 by Rick Breen RN  Outcome: Progressing  Flowsheets (Taken 10/7/2022 1315)  Achieves optimal ventilation and oxygenation:   Assess for changes in respiratory status   Position to facilitate oxygenation and minimize respiratory effort   Assess the need for suctioning and aspirate as needed   Respiratory therapy support as indicated   Assess for changes in mentation and behavior   Oxygen supplementation based on oxygen saturation or arterial blood gases  Note: Lung sounds are clear and diminished. O2 saturation remains greater than 90% on 1L. Weaned off during day.

## 2022-10-08 NOTE — PROGRESS NOTES
[facility]   Nephrology progress note  10/8/2022, 12:37 PM      Pt Name:    Amanda Mancia  MRN:     [id number]     YOB: 1965  Admit Date:    9/28/2022  9:56 AM    Chief Complaint: Nephrology following for ESRD. Subjective:  Patient was seen and examined this morning  No acute distress  On room air  Chest tube in place    Objective:  24HR INTAKE/OUTPUT:    Intake/Output Summary (Last 24 hours) at 10/8/2022 1237  Last data filed at 10/8/2022 0919  Gross per 24 hour   Intake 1260 ml   Output 130 ml   Net 1130 ml           I/O last 3 completed shifts: In: 4155 [P.O.:1070]  Out: 1200 [Chest Tube:300]  I/O this shift:   In: 480 [P.O.:480]  Out: -    Admission weight: 250 lb (113.4 kg)  Wt Readings from Last 3 Encounters:   10/08/22 214 lb 4.6 oz (97.2 kg)   09/13/21 250 lb (113.4 kg)   07/26/21 240 lb (108.9 kg)        Vitals :   Vitals:    10/07/22 2322 10/08/22 0326 10/08/22 0754 10/08/22 1114   BP: (!) 152/70 134/63 (!) 164/75 (!) 119/56   Pulse: 84 86 91 93   Resp: 18 18 20 18   Temp: 99.2 °F (37.3 °C) 98.7 °F (37.1 °C) 98 °F (36.7 °C) 98.2 °F (36.8 °C)   TempSrc: Oral Oral Oral Oral   SpO2: 92% 98% 93% 94%   Weight:  214 lb 4.6 oz (97.2 kg)     Height:           Physical examination  General Appearance: alert and cooperative with exam, appears comfortable, no distress  Mouth/Throat: Oral mucosa moist  Neck: No JVD  Lungs: diminished, right sided chest tube  Heart:  S1, S2 heard  GI: soft, non-tender  Extremities: improved LE edema, left arm AV fistula    Medications:  Infusion:    sodium chloride       Meds:    minoxidil  2.5 mg Oral BID    ketamine  100 mg IntraVENous Once    doxycycline hyclate  100 mg Oral 2 times per day    dexamethasone  0.5 mg Oral Daily    docusate sodium  100 mg Oral BID    senna  1 tablet Oral Nightly    aspirin  81 mg Oral Daily    atorvastatin  40 mg Oral Nightly    cloNIDine  0.3 mg Oral TID    doxazosin  8 mg Oral Nightly    famotidine  20 mg Oral Daily    ferrous sulfate  325 mg Oral Daily with breakfast    folic acid  1,312 mcg Oral Daily    fluticasone  1 spray Nasal Daily    isosorbide mononitrate  120 mg Oral Daily    sertraline  100 mg Oral Daily    verapamil  240 mg Oral Daily    multivitamin  1 tablet Oral Daily    sodium chloride flush  10 mL IntraVENous 2 times per day    heparin (porcine)  5,000 Units SubCUTAneous 3 times per day     Meds prn: hydrOXYzine HCl, traZODone, sodium chloride flush, sodium chloride, ondansetron **OR** ondansetron, polyethylene glycol, acetaminophen, HYDROcodone 5 mg - acetaminophen **OR** HYDROcodone 5 mg - acetaminophen, morphine     Lab Data :  CBC:   Recent Labs     10/06/22  0415 10/07/22  0434 10/08/22  0347   WBC 3.7* 4.9 4.8   HGB 10.5* 9.9* 10.4*   HCT 34.2* 31.8* 33.4*   * 107* 125*       CMP:  Recent Labs     10/06/22  0415 10/07/22  0434 10/08/22  0347    135 137   K 4.7 5.4* 4.5    100 101   CO2 23 25 25   BUN 37* 61* 34*   CREATININE 6.9* 9.4* 6.0*   GLUCOSE 77 83 87   CALCIUM 9.0 9.2 8.8       Hepatic:   No results for input(s): LABALBU, AST, ALT, ALB, BILITOT, ALKPHOS in the last 72 hours. Assessment and Plan:    1. ESRD on HD TTS  -dialyzing MWF while inpatient  Patient had hemodialysis treatment yesterday  No acute need for renal replacement therapy today  Electrolytes are stable    2. Hyperkalemia : Improved with low potassium bath  3. HTN: Much better controlled now  4. Loculated pleural effusion: will have thoracotomy on Monday  5. Anemia in CKD  6. Secondary hyperparathyroidism  7. Chronic fluid overload    D/W patient     Dao Nazario MD  [facility]    This report has been created using voice recognition software.  It may contain minor errors which are inherent in voice recognition technology

## 2022-10-08 NOTE — PROGRESS NOTES
Internal Medicine Resident Progress Note    Patient:  Mera Land    YOB: 1965  Unit/Bed:4K-14/014-A  MRN: [id number]    Acct: [de-identified]   PCP: Davi Guzman MD    Date of Admission: 9/28/2022      Assessment/Plan:  Pleural effusion, right  - Multifactorial; likely secondary to hemodialysis noncompliance  - S/p chest tube 09/28  - S/p chemical pneumolysis 10/04 with tPA and dornase injection  - S/p bronchoscopy 10/06  - 130 cc serosanguineous drainage in the last 24 hours  - Pleural studies indicating exudative effusion with cytology negative for malignant cells  -CT surgery and pulmonology following      Acute hypoxic respiratory failure  - Patient maintaining adequate oxygenation on room air; titrate to maintain SPO2 92 to 96%  - S/p chest tube placement 09/28  - Likely secondary to pleural effusion as well as Hx polysubstance abuse  - Patient encouraged to use incentive spirometer  - Continue 0.5 mg Decadron per pulmonology    Trapped lung, right  - S/p chest tube 09/28  - S/p chemical pneumolysis 10/04 with tPA and dornase injection  - S/p bronchoscopy 10/06  - 130 cc serosanguineous drainage in the last 24 hours  - Pleural studies indicating exudative effusion with cytology negative for malignant cells  -Plan for CT intervention on 10/10 after dialysis    Right lower lobe infiltrate  - Noted on CTA of the chest on 10/02  - Associated with exudative pleural effusion  - S/p chest tube placement 09/28  - S/p tenolysis 10/04 with tPA dornase injection  - S/p bronchoscopy 10/06  - Day 5/30 doxycycline per pulmonology recommendation  - Incentive spirometry, bronchodilation per pulmonology recommendations  - Repeat imaging to assess for interval changes per pulmonology; last CXR 10/08/2022 with no significant interval changes noted  - Plan for CT intervention on 10/10 after dialysis     ESRD, on hemodialysis  - Patient on MWF dialysis schedule  - Continue with low phosphorus and low potassium diet  - Nephrology following  - Judicious IV fluids and diuresis per nephrology recommendations  - Strict I's and O's  - S/p albumin infusion 83/18    Systolic heart failure with preserved ejection fraction  - Echo 9/29 increased left ventricular wall thickness with EF 60%  - Strict I's and O's, daily weights  - Judicious IV fluids and diuresis per nephrology  - Continue Imdur    Hypertrophic cardiomyopathy  - Echo 9/29 showing severe left ventricular concentric hypertrophy as well as dilation of the left atrium  - Continue aspirin, statin, isosorbide mononitrate, verapamil, minoxidil  - Strict I's and O's, daily weights  - Judicious IV fluids and diuresis per nephrology    Pneumothorax, right lower lobe  - Noted on CXR on 10/07  - Chest tube in place  - Advance care recommendations per pulmonology  -Continue to monitor for signs or symptoms of worsening respiratory function    Primary HTN with current hypotension  - Continue to monitor vital signs  - Continue with clonidine, verapamil, minoxidil, and doxazosin regimen with hold parameters    PTSD  - Continue sertraline, Atarax, trazodone  - Mood stable    Unspecified depressive disorder  - Continue sertraline  - Mood stable    GERD  - Continue PPI    Polysubstance abuse  - Patient counseled on substance abuse cessation    HLD  - Continue atorvastatin    Anemia of chronic disease  - Trend CBC  - Continue iron and folate supplementation    REZA  - Patient tolerating BiPAP at night  - Recommendation for outpatient follow-up for PAP titration with sleep clinic    Thoracic spondylosis  - Noted on CTA of the chest on 10/02  - PT/OT consulted; 10/05 [facility] 15    Hyperkalemia, resolved  - Currently on MWF dialysis schedule  - Continue low potassium diet  - Trend BMP     Hyperphosphatemia, resolved  - Currently on MWF dialysis schedule  - Continue low phosphorus diet  - Patient's home phosphorus binder resumed  - Trend phosphorus and BMP     Apical pneumothorax, right, resolved  Hx Thoracic Aneurysm  Hx AAA s/p aortic stent graft 7/2018  Hx COVID-19 pneumonia  Hx atrial flutter  Hx peripheral vascular disease  Hx hypertensive emergency  - Noted    Expected discharge date: 10/11/2022    Disposition:   [x] Home  [] TCU  [] Rehab  [] Psych  [] SNF  [] [place]  [] Other-    ===================================================================      Chief Complaint: Shortness of breath    Hospital Course: This is a 56-year-old -American male who presented to [address] [place] [address] on 09/28/2022 with worsening shortness of breath at rest and with exertion. CXR showed large right pleural effusion and patient required PAP therapy for adequate oxygenation. Patient was admitted and chest tube placed on 09/28 with 2 L of serosanguineous fluid drainage. Dialysis also performed at 09/28 with 2 L of fluid. Repeat dialysis performed on 09/29 with albumin administration. Repeat dialysis performed on 09/30/2022. Pleural fluid analysis was consistent with exudative effusion with negative cytology. Repeat CTA of the chest showed improvement in the right-sided pleural effusion, but showed evidence of a right apical pneumothorax which resolved on subsequent imaging. Repeat dialysis performed on 10/03 with 2 L removed. Dornase and alteplase administered through chest tube on 10/04 by intensivist.  Hemodialysis performed on 10/05 with 2 L of fluid removed. Bronchoscopy performed on 93/78 without complication. There was evidence of chronic inflammation with pitting airway disease with no mass identified. Subjective (past 24 hours):   Patient seen and evaluated at bedside. He denies chest pain, fever, chills, nausea, vomiting, and diarrhea. He is tolerating his chest tube without any right-sided pain. He states that he is eating well. Last bowel movement 10/0 8 in the morning without difficulty in passage. Denies any other acute symptoms or concerns.     ROS: reviewed complete ROS unchanged unless otherwise stated in hospital course/subjective portion. Medications:  Reviewed    Infusion Medications    sodium chloride       Scheduled Medications    minoxidil  2.5 mg Oral BID    ketamine  100 mg IntraVENous Once    doxycycline hyclate  100 mg Oral 2 times per day    dexamethasone  0.5 mg Oral Daily    docusate sodium  100 mg Oral BID    senna  1 tablet Oral Nightly    aspirin  81 mg Oral Daily    atorvastatin  40 mg Oral Nightly    cloNIDine  0.3 mg Oral TID    doxazosin  8 mg Oral Nightly    famotidine  20 mg Oral Daily    ferrous sulfate  325 mg Oral Daily with breakfast    folic acid  7,325 mcg Oral Daily    fluticasone  1 spray Nasal Daily    isosorbide mononitrate  120 mg Oral Daily    sertraline  100 mg Oral Daily    verapamil  240 mg Oral Daily    multivitamin  1 tablet Oral Daily    sodium chloride flush  10 mL IntraVENous 2 times per day    heparin (porcine)  5,000 Units SubCUTAneous 3 times per day     PRN Meds: hydrOXYzine HCl, traZODone, sodium chloride flush, sodium chloride, ondansetron **OR** ondansetron, polyethylene glycol, acetaminophen, HYDROcodone 5 mg - acetaminophen **OR** HYDROcodone 5 mg - acetaminophen, morphine        Intake/Output Summary (Last 24 hours) at 10/8/2022 1001  Last data filed at 10/8/2022 0919  Gross per 24 hour   Intake 1660 ml   Output 1030 ml   Net 630 ml       Exam:  BP (!) 164/75   Pulse 91   Temp 98 °F (36.7 °C) (Oral)   Resp 20   Ht 6' 3\" (1.905 m)   Wt 214 lb 4.6 oz (97.2 kg)   SpO2 93%   BMI 26.78 kg/m²     General: No distress, appears stated age. Eyes:  PERRL. Conjunctivae/corneas clear. HENT: Head normal appearing. Nares normal. Oral mucosa moist.  Hearing intact. Neck: Supple, with full range of motion. Trachea midline. No gross JVD appreciated. Respiratory: Normal respiratory effort. Diffuse crackles heard in the right lobe. No wheezes or rhonchi. Chest tube in place draining serosanguineous fluid.   Cardiovascular: Normal rate, regular rhythm with normal S1/S2 without murmurs. No lower extremity edema. Abdomen: Soft, non-tender, non-distended with normal bowel sounds. Musculoskeletal: No joint swelling or tenderness. Normal tone. No abnormal movements. Protuberance associated with dialysis port in left arm  Skin: Warm and dry. No rashes or lesions. Neurologic:  No focal sensory/motor deficits in the upper or lower extremities. Cranial nerves:  grossly non-focal 2-12. Psychiatric: Alert and oriented, normal insight and thought content. Capillary Refill: Brisk,< 3 seconds. Peripheral Pulses: +2 palpable, equal bilaterally. Labs:   Recent Labs     10/06/22  0415 10/07/22  0434 10/08/22  0347   WBC 3.7* 4.9 4.8   HGB 10.5* 9.9* 10.4*   HCT 34.2* 31.8* 33.4*   * 107* 125*     Recent Labs     10/06/22  0415 10/07/22  0434 10/08/22  0347    135 137   K 4.7 5.4* 4.5    100 101   CO2 23 25 25   BUN 37* 61* 34*   CREATININE 6.9* 9.4* 6.0*   CALCIUM 9.0 9.2 8.8     No results for input(s): AST, ALT, BILIDIR, BILITOT, ALKPHOS in the last 72 hours. No results for input(s): INR in the last 72 hours. No results for input(s): [place] in the last 72 hours. No results for input(s): PROCAL in the last 72 hours. Lab Results   Component Value Date/Time    NITRU NEGATIVE 04/03/2018 07:45 PM    WBCUA 2-4 04/03/2018 07:45 PM    BACTERIA NONE 04/03/2018 07:45 PM    RBCUA 5-10 04/03/2018 07:45 PM    BLOODU SMALL 04/03/2018 07:45 PM    SPECGRAV 1.014 03/07/2018 09:10 PM    GLUCOSEU NEGATIVE 04/03/2018 07:45 PM       Radiology (48 hours):  XR CHEST PORTABLE    Result Date: 10/5/2022  Small pneumothorax at the right lung base. Chest tube tip is at the right lung base, grossly unchanged. Small right pleural effusion, decreased. Right lung base consolidation, increased, suspicious for atelectasis or pneumonia. Cardiomegaly.  This document has been electronically signed by: Leila Montes MD on 10/05/2022 03:41 AM        DVT prophylaxis:    [] Lovenox  [] SCDs  [x] SQ Heparin  [] Encourage ambulation   [] Already on Anticoagulation       Diet: ADULT DIET; Regular; 5 carb choices (75 gm/meal); Low Fat/Low Chol/High Fiber/2 gm Na; Low Sodium (2 gm); Low Potassium (Less than 3000 mg/day);  Low Phosphorus (Less than 1000 mg)  ADULT ORAL NUTRITION SUPPLEMENT; Breakfast, Lunch; Renal Oral Supplement  Code Status: Full Code  PT/OT: Consulted      Electronically signed by Em Briones MD on 10/8/2022 at 10:01 AM    Case was discussed with Attending, Dr. Cameron briggs

## 2022-10-08 NOTE — PLAN OF CARE
Pt is on bipap during sleep. He is sitting is a chair and alert to place, time and president.  We will continue to support his breathing while sleeping

## 2022-10-09 ENCOUNTER — APPOINTMENT (OUTPATIENT)
Dept: GENERAL RADIOLOGY | Age: 57
DRG: 163 | End: 2022-10-09
Payer: MEDICARE

## 2022-10-09 LAB
ANION GAP SERPL CALCULATED.3IONS-SCNC: 12 MEQ/L (ref 8–16)
BUN BLDV-MCNC: 60 MG/DL (ref 7–22)
CALCIUM SERPL-MCNC: 9.1 MG/DL (ref 8.5–10.5)
CHLORIDE BLD-SCNC: 100 MEQ/L (ref 98–111)
CO2: 25 MEQ/L (ref 23–33)
CREAT SERPL-MCNC: 9 MG/DL (ref 0.4–1.2)
GFR SERPL CREATININE-BSD FRML MDRD: 7 ML/MIN/1.73M2
GLUCOSE BLD-MCNC: 87 MG/DL (ref 70–108)
POTASSIUM SERPL-SCNC: 4.8 MEQ/L (ref 3.5–5.2)
SODIUM BLD-SCNC: 137 MEQ/L (ref 135–145)

## 2022-10-09 PROCEDURE — 6360000002 HC RX W HCPCS: Performed by: INTERNAL MEDICINE

## 2022-10-09 PROCEDURE — 6370000000 HC RX 637 (ALT 250 FOR IP): Performed by: PHYSICIAN ASSISTANT

## 2022-10-09 PROCEDURE — 80048 BASIC METABOLIC PNL TOTAL CA: CPT

## 2022-10-09 PROCEDURE — 97110 THERAPEUTIC EXERCISES: CPT

## 2022-10-09 PROCEDURE — 6370000000 HC RX 637 (ALT 250 FOR IP): Performed by: NURSE PRACTITIONER

## 2022-10-09 PROCEDURE — 97116 GAIT TRAINING THERAPY: CPT

## 2022-10-09 PROCEDURE — 2580000003 HC RX 258: Performed by: PHYSICIAN ASSISTANT

## 2022-10-09 PROCEDURE — 36415 COLL VENOUS BLD VENIPUNCTURE: CPT

## 2022-10-09 PROCEDURE — 99232 SBSQ HOSP IP/OBS MODERATE 35: CPT | Performed by: INTERNAL MEDICINE

## 2022-10-09 PROCEDURE — 6370000000 HC RX 637 (ALT 250 FOR IP)

## 2022-10-09 PROCEDURE — 6360000002 HC RX W HCPCS: Performed by: PHYSICIAN ASSISTANT

## 2022-10-09 PROCEDURE — 6370000000 HC RX 637 (ALT 250 FOR IP): Performed by: INTERNAL MEDICINE

## 2022-10-09 PROCEDURE — 2060000000 HC ICU INTERMEDIATE R&B

## 2022-10-09 PROCEDURE — 6370000000 HC RX 637 (ALT 250 FOR IP): Performed by: STUDENT IN AN ORGANIZED HEALTH CARE EDUCATION/TRAINING PROGRAM

## 2022-10-09 PROCEDURE — 71045 X-RAY EXAM CHEST 1 VIEW: CPT

## 2022-10-09 RX ORDER — SODIUM CHLORIDE 0.9 % (FLUSH) 0.9 %
5-40 SYRINGE (ML) INJECTION PRN
Status: DISCONTINUED | OUTPATIENT
Start: 2022-10-09 | End: 2022-10-10 | Stop reason: HOSPADM

## 2022-10-09 RX ORDER — SODIUM CHLORIDE 0.9 % (FLUSH) 0.9 %
5-40 SYRINGE (ML) INJECTION EVERY 12 HOURS SCHEDULED
Status: DISCONTINUED | OUTPATIENT
Start: 2022-10-09 | End: 2022-10-10 | Stop reason: HOSPADM

## 2022-10-09 RX ORDER — SODIUM CHLORIDE 9 MG/ML
INJECTION, SOLUTION INTRAVENOUS PRN
Status: DISCONTINUED | OUTPATIENT
Start: 2022-10-09 | End: 2022-10-10 | Stop reason: HOSPADM

## 2022-10-09 RX ADMIN — ISOSORBIDE MONONITRATE 120 MG: 60 TABLET, EXTENDED RELEASE ORAL at 08:22

## 2022-10-09 RX ADMIN — MINOXIDIL 2.5 MG: 2.5 TABLET ORAL at 08:21

## 2022-10-09 RX ADMIN — ATORVASTATIN CALCIUM 40 MG: 40 TABLET, FILM COATED ORAL at 20:34

## 2022-10-09 RX ADMIN — DOXYCYCLINE HYCLATE 100 MG: 100 TABLET, COATED ORAL at 08:26

## 2022-10-09 RX ADMIN — DOXYCYCLINE HYCLATE 100 MG: 100 TABLET, COATED ORAL at 23:21

## 2022-10-09 RX ADMIN — CLONIDINE HYDROCHLORIDE 0.3 MG: 0.2 TABLET ORAL at 20:34

## 2022-10-09 RX ADMIN — DEXAMETHASONE 0.5 MG: 0.5 TABLET ORAL at 08:23

## 2022-10-09 RX ADMIN — ASPIRIN 81 MG: 81 TABLET, COATED ORAL at 08:19

## 2022-10-09 RX ADMIN — VERAPAMIL HYDROCHLORIDE 240 MG: 240 TABLET, FILM COATED, EXTENDED RELEASE ORAL at 08:21

## 2022-10-09 RX ADMIN — DOXAZOSIN 8 MG: 4 TABLET ORAL at 20:34

## 2022-10-09 RX ADMIN — FOLIC ACID 1000 MCG: 1 TABLET ORAL at 08:21

## 2022-10-09 RX ADMIN — CLONIDINE HYDROCHLORIDE 0.3 MG: 0.2 TABLET ORAL at 13:35

## 2022-10-09 RX ADMIN — FAMOTIDINE 20 MG: 20 TABLET ORAL at 08:20

## 2022-10-09 RX ADMIN — FLUTICASONE PROPIONATE 1 SPRAY: 50 SPRAY, METERED NASAL at 08:20

## 2022-10-09 RX ADMIN — SENNOSIDES 8.6 MG: 8.6 TABLET, COATED ORAL at 20:34

## 2022-10-09 RX ADMIN — MINOXIDIL 2.5 MG: 2.5 TABLET ORAL at 20:34

## 2022-10-09 RX ADMIN — SODIUM CHLORIDE, PRESERVATIVE FREE 10 ML: 5 INJECTION INTRAVENOUS at 20:33

## 2022-10-09 RX ADMIN — HEPARIN SODIUM 5000 UNITS: 5000 INJECTION INTRAVENOUS; SUBCUTANEOUS at 06:37

## 2022-10-09 RX ADMIN — HEPARIN SODIUM 5000 UNITS: 5000 INJECTION INTRAVENOUS; SUBCUTANEOUS at 13:36

## 2022-10-09 RX ADMIN — SERTRALINE 100 MG: 100 TABLET, FILM COATED ORAL at 08:22

## 2022-10-09 RX ADMIN — DOCUSATE SODIUM 100 MG: 100 CAPSULE, LIQUID FILLED ORAL at 08:19

## 2022-10-09 RX ADMIN — CLONIDINE HYDROCHLORIDE 0.3 MG: 0.2 TABLET ORAL at 08:23

## 2022-10-09 RX ADMIN — FERROUS SULFATE TAB 325 MG (65 MG ELEMENTAL FE) 325 MG: 325 (65 FE) TAB at 08:24

## 2022-10-09 RX ADMIN — SODIUM CHLORIDE, PRESERVATIVE FREE 10 ML: 5 INJECTION INTRAVENOUS at 08:20

## 2022-10-09 RX ADMIN — Medication 1 TABLET: at 08:22

## 2022-10-09 RX ADMIN — DOCUSATE SODIUM 100 MG: 100 CAPSULE, LIQUID FILLED ORAL at 20:34

## 2022-10-09 ASSESSMENT — PAIN SCALES - GENERAL
PAINLEVEL_OUTOF10: 5
PAINLEVEL_OUTOF10: 5

## 2022-10-09 ASSESSMENT — PAIN DESCRIPTION - ONSET
ONSET: ON-GOING
ONSET: ON-GOING

## 2022-10-09 ASSESSMENT — PAIN DESCRIPTION - ORIENTATION
ORIENTATION: RIGHT
ORIENTATION: RIGHT;LOWER

## 2022-10-09 ASSESSMENT — PAIN SCALES - WONG BAKER
WONGBAKER_NUMERICALRESPONSE: 0

## 2022-10-09 ASSESSMENT — PAIN DESCRIPTION - PAIN TYPE
TYPE: ACUTE PAIN;SURGICAL PAIN
TYPE: ACUTE PAIN;SURGICAL PAIN

## 2022-10-09 ASSESSMENT — PAIN DESCRIPTION - DESCRIPTORS
DESCRIPTORS: ACHING
DESCRIPTORS: ACHING;SORE

## 2022-10-09 ASSESSMENT — PAIN DESCRIPTION - FREQUENCY
FREQUENCY: INTERMITTENT
FREQUENCY: INTERMITTENT

## 2022-10-09 ASSESSMENT — PAIN DESCRIPTION - LOCATION
LOCATION: RIB CAGE
LOCATION: NECK;RIB CAGE

## 2022-10-09 NOTE — PROGRESS NOTES
Cardiovascular Surgery Progress Note    Comfortable on RA  Stable  Plan right decortication tomorrow with Dr. Christian Abdullahi

## 2022-10-09 NOTE — PROGRESS NOTES
[facility]  INPATIENT PHYSICAL THERAPY  DAILY NOTE  [facility] ICU STEPDOWN TELEMETRY 4K - 4K-14/014-A    Time In: 1409  Time Out: 1430  Timed Code Treatment Minutes: 23 Minutes  Minutes: 23          Date: 10/9/2022  Patient Name: Yasmine Cifuentes,  Gender:  male        MRN: [id number]  : 1965  (56 y.o.)     Referring Practitioner: Wili Molina MD  Diagnosis: Hypoxia  Additional Pertinent Hx: Jackeline Gann is a 79-year-old black male who presented to [facility] on 2022 with worsening shortness of breath. He has a past medical history of current every day smoker, AAA, stage renal disease, cocaine abuse, depression, diabetes mellitus, hyperlipidemia, hypertension, medical noncompliance, PTSD. Per report patient presented to the emergency department on  with worsening shortness of breath. Chest x-ray showed large right pleural effusion. Patient was placed on BiPAP. He was admitted to [facility] where he underwent a chest tube placement on  with 2 L of serosanguineous fluid. Dialysis was also performed on . He underwent albumin administration on . Repeat dialysis on . Chest tube drainage was noted for exudative effusion. Prior Level of Function:  Lives With: Spouse  Type of Home: House  Home Layout: Two level, Bed/Bath upstairs  Home Access: Stairs to enter with rails  Entrance Stairs - Number of Steps: 3+4 steps and another flight to get to floor with bedroom and bathroom  Home Equipment:  (None)   Bathroom Shower/Tub: Tub/Shower unit  Bathroom Toilet: Standard  Bathroom Accessibility: Accessible    Receives Help From: Family  ADL Assistance: Independent  Homemaking Assistance: Needs assistance  Homemaking Responsibilities: No  Ambulation Assistance: Independent  Transfer Assistance: Independent  Active : No  Additional Comments: Pt wakes early in the morning every day. He has an early chair time for hemodialysis.  He plans to do activities in the morning. Pt likes to soak in a tub but he also stands to rinse off using the shower. Restrictions/Precautions:  Restrictions/Precautions: Fall Risk     SUBJECTIVE: RN approved session. Patient in bed at arrival and agreeable to therapy. PAIN: 0/10: Denied     Vitals: Oxygen: 85-95% on room air, RN aware     OBJECTIVE:  Bed Mobility:  Supine to Sit: Stand By Assistance    Transfers:  Sit to Stand: Stand By Assistance  Stand to Sit:Stand By Assistance    Ambulation:  Contact Guard Assistance  Distance: 150'  Surface: Level Tile  Device:Rolling Walker  Gait Deviations: Forward Flexed Posture, Slow Janina, Decreased Step Length Bilaterally, and Mild Path Deviations    Exercise:  Patient was guided in 1 set(s) 10 reps of exercise to both lower extremities. Seated marches, Seated heel/toe raises, and Long arc quads. Exercises were completed for increased independence with functional mobility. Functional Outcome Measures: Completed  AM-PAC Inpatient Mobility without Stair Climbing Raw Score : 15  AM-PAC Inpatient without Stair Climbing T-Scale Score : 43.03    ASSESSMENT:  Assessment: Patient progressing toward established goals. Activity Tolerance:  Patient tolerance of  treatment: good.       Equipment Recommendations:Equipment Needed: Yes  Mobility Devices: Rosette Julia: Rolling  Discharge Recommendations: Continue to assess pending progress, Home with Home Health PT, and Patient would benefit from continued PT at discharge  Plan: Current Treatment Recommendations: Strengthening, Balance training, Functional mobility training, Transfer training, Endurance training, Neuromuscular re-education, Stair training, Gait training, Equipment evaluation, education, & procurement, Safety education & training, Patient/Caregiver education & training, Therapeutic activities  General Plan:  (3-5x GM)    Patient Education  Patient Education: Plan of Care    Goals:  Patient Goals : to go home in the next couple days  Short Term Goals  Time Frame for Short Term Goals: by discharge  Short Term Goal 1: Pt to transfer supine <--> sit S to enable pt to get in/out of bed. Short Term Goal 2: Pt to transfer sit <--> stand S for increased functional mobility. Short Term Goal 3: Pt to ambulate >250 feet with RW S for community re-entry. Short Term Goal 4: Pt to ascend/descend 12 steps with HR SBA for bed/bath accesss. Long Term Goals  Time Frame for Long Term Goals : NA due to short length of stay. Following session, patient left in safe position with all fall risk precautions in place.

## 2022-10-09 NOTE — PLAN OF CARE
Problem: Discharge Planning  Goal: Discharge to home or other facility with appropriate resources  10/9/2022 1256 by Leanna Mejia RN  Outcome: Progressing  Flowsheets (Taken 10/9/2022 1256)  Discharge to home or other facility with appropriate resources:   Identify barriers to discharge with patient and caregiver   Identify discharge learning needs (meds, wound care, etc)   Refer to discharge planning if patient needs post-hospital services based on physician order or complex needs related to functional status, cognitive ability or social support system   Arrange for needed discharge resources and transportation as appropriate     Problem: Pain  Goal: Verbalizes/displays adequate comfort level or baseline comfort level  10/9/2022 1256 by Leanna Mejia RN  Outcome: Progressing  Flowsheets (Taken 10/9/2022 1256)  Verbalizes/displays adequate comfort level or baseline comfort level:   Encourage patient to monitor pain and request assistance   Assess pain using appropriate pain scale   Implement non-pharmacological measures as appropriate and evaluate response  Note: Pain goal zero      Problem: Safety - Adult  Goal: Free from fall injury  10/9/2022 1256 by Leanna Mejia RN  Outcome: Progressing  Flowsheets (Taken 10/9/2022 1256)  Free From Fall Injury:   Instruct family/caregiver on patient safety   Based on caregiver fall risk screen, instruct family/caregiver to ask for assistance with transferring infant if caregiver noted to have fall risk factors  Note: 1 assist, hourly rounding, call light within reach, bed alarm in use      Problem: ABCDS Injury Assessment  Goal: Absence of physical injury  10/9/2022 1256 by Leanna Mejia RN  Outcome: Progressing  Flowsheets (Taken 10/9/2022 1256)  Absence of Physical Injury: Implement safety measures based on patient assessment  Note: 1 assist, hourly rounding, call light within reach, bed alarm in use.       Problem: Respiratory - Adult  Goal: Achieves optimal redness and/or skin breakdown  Note: Monitor q 4 hr, educated on q2hr turns while in bed, monitor right wound      Problem: Skin/Tissue Integrity - Adult  Goal: Incisions, wounds, or drain sites healing without S/S of infection  10/9/2022 1256 by Azalea Miller RN  Outcome: Progressing  Flowsheets (Taken 10/9/2022 1256)  Incisions, Wounds, or Drain Sites Healing Without Sign and Symptoms of Infection:   ADMISSION and DAILY: Assess and document risk factors for pressure ulcer development   Initiate pressure ulcer prevention bundle as indicated  Note: Monitor q4 hr,      Problem: Skin/Tissue Integrity - Adult  Goal: Oral mucous membranes remain intact  10/9/2022 1256 by Azalea Miller RN  Outcome: Progressing  Flowsheets (Taken 10/9/2022 1256)  Oral Mucous Membranes Remain Intact:   Assess oral mucosa and hygiene practices   Implement preventative oral hygiene regimen   Implement oral medicated treatments as ordered     Problem: Infection - Adult  Goal: Absence of infection during hospitalization  Recent Flowsheet Documentation  Taken 10/9/2022 1256 by Azalea Miller RN  Absence of infection during hospitalization:   Assess and monitor for signs and symptoms of infection   Monitor lab/diagnostic results   Monitor all insertion sites i.e., indwelling lines, tubes and drains   Instruct and encourage patient and family to use good hand hygiene technique     Problem: Infection - Adult  Goal: Absence of infection during hospitalization  10/9/2022 1256 by Azalea Miller RN  Outcome: Progressing  Flowsheets (Taken 10/9/2022 1256)  Absence of infection during hospitalization:   Assess and monitor for signs and symptoms of infection   Monitor lab/diagnostic results   Monitor all insertion sites i.e., indwelling lines, tubes and drains   Instruct and encourage patient and family to use good hand hygiene technique     Problem: Metabolic/Fluid and Electrolytes - Adult  Goal: Electrolytes maintained within normal limits  10/9/2022 1256 by Hannah Matt RN  Outcome: Progressing  Flowsheets (Taken 10/9/2022 1256)  Electrolytes maintained within normal limits:   Monitor labs and assess patient for signs and symptoms of electrolyte imbalances   Monitor response to electrolyte replacements, including repeat lab results as appropriate   Instruct patient on fluid and nutrition restrictions as appropriate   Administer electrolyte replacement as ordered   Fluid restriction as ordered     Problem: Metabolic/Fluid and Electrolytes - Adult  Goal: Hemodynamic stability and optimal renal function maintained  10/9/2022 1256 by Hannah Matt RN  Outcome: Progressing  Flowsheets (Taken 10/9/2022 1256)  Hemodynamic stability and optimal renal function maintained:   Monitor labs and assess for signs and symptoms of volume excess or deficit   Monitor intake, output and patient weight   Monitor response to interventions for patient's volume status, including labs, urine output, blood pressure (other measures as available)   Encourage oral intake as appropriate   Instruct patient on fluid and nutrition restrictions as appropriate   Monitor urine specific gravity, serum osmolarity and serum sodium as indicated or ordered     Problem: Hematologic - Adult  Goal: Maintains hematologic stability  10/9/2022 1256 by Hannah Matt RN  Outcome: Progressing  Flowsheets (Taken 10/9/2022 1256)  Maintains hematologic stability:   Assess for signs and symptoms of bleeding or hemorrhage   Monitor labs for bleeding or clotting disorders     Problem: Neurosensory - Adult  Goal: Achieves maximal functionality and self care  10/9/2022 1256 by Hannah Matt RN  Outcome: Progressing  Flowsheets (Taken 10/9/2022 1256)  Achieves maximal functionality and self care:   Monitor swallowing and airway patency with patient fatigue and changes in neurological status   Encourage and assist patient to increase activity and self care with guidance from physical therapy/occupational therapy     Problem: Neurosensory - Adult  Goal: Achieves stable or improved neurological status  10/9/2022 1256 by Denita Gonzalez RN  Outcome: Progressing  Flowsheets (Taken 10/9/2022 1256)  Achieves stable or improved neurological status:   Assess for and report changes in neurological status   Maintain blood pressure and fluid volume within ordered parameters to optimize cerebral perfusion and minimize risk of hemorrhage   Monitor temperature, glucose, and sodium.  Initiate appropriate interventions as ordered     Problem: Musculoskeletal - Adult  Goal: Return mobility to safest level of function  10/9/2022 1256 by Denita Gonzalez RN  Outcome: Progressing  Flowsheets (Taken 10/9/2022 1256)  Return Mobility to Safest Level of Function:   Assess patient stability and activity tolerance for standing, transferring and ambulating with or without assistive devices   Assist with transfers and ambulation using safe patient handling equipment as needed   Ensure adequate protection for wounds/incisions during mobilization   Instruct patient/family in ordered activity level  Note: 1 assist SBA      Problem: Musculoskeletal - Adult  Goal: Maintain proper alignment of affected body part  10/9/2022 1256 by Denita Gonzalez RN  Outcome: Progressing  Flowsheets (Taken 10/9/2022 1256)  Maintain proper alignment of affected body part:   Support and protect limb and body alignment per provider's orders   Instruct and reinforce with patient and family use of appropriate assistive device and precautions (e.g. spinal or hip dislocation precautions)     Problem: Musculoskeletal - Adult  Goal: Return ADL status to a safe level of function  10/9/2022 1256 by Denita Gonzalez RN  Outcome: Progressing  Flowsheets (Taken 10/9/2022 1256)  Return ADL Status to a Safe Level of Function:   Administer medication as ordered   Assist and instruct patient to increase activity and self care as tolerated   Assess activities of daily living deficits and provide assistive devices as needed   Obtain physical therapy/occupational therapy consults as needed     Problem: Gastrointestinal - Adult  Goal: Maintains or returns to baseline bowel function  10/9/2022 1256 by Woody Olmstead RN  Outcome: Progressing  Flowsheets (Taken 10/9/2022 1256)  Maintains or returns to baseline bowel function:   Assess bowel function   Encourage oral fluids to ensure adequate hydration   Administer ordered medications as needed   Encourage mobilization and activity   Nutrition consult to assist patient with appropriate food choices     Problem: Gastrointestinal - Adult  Goal: Maintains adequate nutritional intake  10/9/2022 1256 by Woody Olmstead RN  Outcome: Progressing  Flowsheets (Taken 10/9/2022 1256)  Maintains adequate nutritional intake:   Monitor percentage of each meal consumed   Monitor intake and output, weight and lab values   Assist with meals as needed     Problem: Genitourinary - Adult  Goal: Absence of urinary retention  10/9/2022 1256 by Woody Olmstead RN  Outcome: Progressing  Flowsheets (Taken 10/9/2022 1256)  Absence of urinary retention:   Assess patients ability to void and empty bladder   Monitor intake/output and perform bladder scan as needed     Problem: Skin/Tissue Integrity  Goal: Absence of new skin breakdown  Description: 1. Monitor for areas of redness and/or skin breakdown  2. Assess vascular access sites hourly  3. Every 4-6 hours minimum:  Change oxygen saturation probe site  4. Every 4-6 hours:  If on nasal continuous positive airway pressure, respiratory therapy assess nares and determine need for appliance change or resting period.   10/9/2022 1256 by Woody Olmstead RN  Outcome: Progressing  Note: Monitor q4 hr, educate on q2hr turns and pressure ulcer prevention     Problem: Chronic Conditions and Co-morbidities  Goal: Patient's chronic conditions and co-morbidity symptoms are monitored and maintained or improved  10/9/2022 1256 by Diana Charles, RN  Outcome: Progressing  Flowsheets (Taken 10/9/2022 1256)  Care Plan - Patient's Chronic Conditions and Co-Morbidity Symptoms are Monitored and Maintained or Improved:   Monitor and assess patient's chronic conditions and comorbid symptoms for stability, deterioration, or improvement   Collaborate with multidisciplinary team to address chronic and comorbid conditions and prevent exacerbation or deterioration   Update acute care plan with appropriate goals if chronic or comorbid symptoms are exacerbated and prevent overall improvement and discharge     Problem: Nutrition Deficit:  Goal: Optimize nutritional status  10/9/2022 1256 by Diana Charles, RN  Outcome: Progressing  Flowsheets (Taken 10/9/2022 1256)  Nutrient intake appropriate for improving, restoring, or maintaining nutritional needs:   Assess nutritional status and recommend course of action   Monitor oral intake, labs, and treatment plans   Recommend appropriate diets, oral nutritional supplements, and vitamin/mineral supplements   Provide specific nutrition education to patient or family as appropriate   Care plan reviewed with patient and wife. Patient and wife verbalize understanding of the plan of care and contribute to goal setting.

## 2022-10-09 NOTE — PLAN OF CARE
Problem: Discharge Planning  Goal: Discharge to home or other facility with appropriate resources  10/9/2022 0057 by Mateo Landaverde RN  Outcome: Progressing  10/8/2022 1342 by Leanna Mejia RN  Outcome: Progressing  Flowsheets (Taken 10/8/2022 1342)  Discharge to home or other facility with appropriate resources:   Identify barriers to discharge with patient and caregiver   Identify discharge learning needs (meds, wound care, etc)   Refer to discharge planning if patient needs post-hospital services based on physician order or complex needs related to functional status, cognitive ability or social support system   Arrange for needed discharge resources and transportation as appropriate     Problem: Pain  Goal: Verbalizes/displays adequate comfort level or baseline comfort level  10/9/2022 0057 by Mateo Landaverde RN  Outcome: Progressing  10/8/2022 1342 by Leanna Mejia RN  Outcome: Progressing  Flowsheets (Taken 10/8/2022 1342)  Verbalizes/displays adequate comfort level or baseline comfort level:   Encourage patient to monitor pain and request assistance   Assess pain using appropriate pain scale   Implement non-pharmacological measures as appropriate and evaluate response  Note: Pain goal zero      Problem: Safety - Adult  Goal: Free from fall injury  10/9/2022 0057 by Mateo Landaverde RN  Outcome: Progressing  10/8/2022 1342 by Leanna Mejia RN  Outcome: Progressing  Flowsheets (Taken 10/8/2022 1342)  Free From Fall Injury:   Instruct family/caregiver on patient safety   Based on caregiver fall risk screen, instruct family/caregiver to ask for assistance with transferring infant if caregiver noted to have fall risk factors  Note: Hourly rounding, call light wtihin reach, chair and bed alarm in use, 1 assist with ambulation      Problem: ABCDS Injury Assessment  Goal: Absence of physical injury  10/9/2022 0057 by Mateo Landaverde RN  Outcome: Progressing  10/8/2022 1342 by Leanna Mejia RN  Outcome: Progressing  Flowsheets (Taken 10/8/2022 1342)  Absence of Physical Injury: Implement safety measures based on patient assessment  Note: Hourly rounding, call light within reach, bed and chair alarm in use, 1 assist with ambulation      Problem: Respiratory - Adult  Goal: Achieves optimal ventilation and oxygenation  10/9/2022 0057 by Denia Talbert RN  Outcome: Progressing  Flowsheets (Taken 10/8/2022 1945)  Achieves optimal ventilation and oxygenation: Assess for changes in respiratory status  10/8/2022 1342 by Leila aPiz RN  Outcome: Progressing  Flowsheets (Taken 10/8/2022 1342)  Achieves optimal ventilation and oxygenation:   Assess for changes in respiratory status   Position to facilitate oxygenation and minimize respiratory effort   Assess and instruct to report shortness of breath or any respiratory difficulty   Encourage broncho-pulmonary hygiene including cough, deep breathe, incentive spirometry   Assess for changes in mentation and behavior  10/8/2022 1258 by Grace Hart RCP  Outcome: Progressing     Problem: Cardiovascular - Adult  Goal: Maintains optimal cardiac output and hemodynamic stability  10/9/2022 0057 by Denia Talbert RN  Outcome: Progressing  Flowsheets (Taken 10/8/2022 1945)  Maintains optimal cardiac output and hemodynamic stability: Monitor blood pressure and heart rate  10/8/2022 1342 by Leila Paiz RN  Outcome: Progressing  Flowsheets (Taken 10/8/2022 1342)  Maintains optimal cardiac output and hemodynamic stability:   Monitor blood pressure and heart rate   Monitor urine output and notify Licensed Independent Practitioner for values outside of normal range   Assess for signs of decreased cardiac output  Goal: Absence of cardiac dysrhythmias or at baseline  10/9/2022 0057 by Denia Talbert RN  Outcome: Progressing  Flowsheets (Taken 10/8/2022 1945)  Absence of cardiac dysrhythmias or at baseline: Monitor cardiac rate and rhythm  10/8/2022 1342 by Leila Paiz RN  Outcome: Progressing  Flowsheets (Taken 10/8/2022 1342)  Absence of cardiac dysrhythmias or at baseline:   Monitor cardiac rate and rhythm   Administer antiarrhythmia medication and electrolyte replacement as ordered   Assess for signs of decreased cardiac output     Problem: Skin/Tissue Integrity - Adult  Goal: Skin integrity remains intact  10/9/2022 0057 by Gracie Lechuga RN  Outcome: Progressing  10/8/2022 1342 by Luz Mac RN  Outcome: Progressing  Flowsheets (Taken 10/8/2022 1342)  Skin Integrity Remains Intact: Monitor for areas of redness and/or skin breakdown  Note: Monitor for new skin breakdown q4 hr  Goal: Incisions, wounds, or drain sites healing without S/S of infection  10/9/2022 0057 by Gracie Lechuga RN  Outcome: Progressing  10/8/2022 1342 by Luz Mac RN  Outcome: Progressing  Flowsheets (Taken 10/8/2022 1342)  Incisions, Wounds, or Drain Sites Healing Without Sign and Symptoms of Infection:   ADMISSION and DAILY: Assess and document risk factors for pressure ulcer development   Initiate pressure ulcer prevention bundle as indicated  Note: Monitor q4 hr   Goal: Oral mucous membranes remain intact  10/9/2022 0057 by Gracie Lechuga RN  Outcome: Progressing  10/8/2022 1342 by Luz Mac RN  Outcome: Progressing  Flowsheets (Taken 10/8/2022 1342)  Oral Mucous Membranes Remain Intact:   Assess oral mucosa and hygiene practices   Implement preventative oral hygiene regimen   Implement oral medicated treatments as ordered     Problem: Infection - Adult  Goal: Absence of infection at discharge  10/9/2022 0057 by Gracie Lechuga RN  Outcome: Progressing  10/8/2022 1342 by Luz Mac RN  Outcome: Progressing  Flowsheets (Taken 10/8/2022 1342)  Absence of infection at discharge:   Assess and monitor for signs and symptoms of infection   Monitor lab/diagnostic results   Monitor all insertion sites i.e., indwelling lines, tubes and drains   Instruct and encourage patient and family to use good hand hygiene technique   Administer medications as ordered  Goal: Absence of infection during hospitalization  10/9/2022 0057 by Marita Painting RN  Outcome: Progressing  10/8/2022 1342 by Diana Charles RN  Outcome: Progressing  Flowsheets (Taken 10/8/2022 1342)  Absence of infection during hospitalization:   Assess and monitor for signs and symptoms of infection   Monitor lab/diagnostic results   Monitor all insertion sites i.e., indwelling lines, tubes and drains   Administer medications as ordered   Instruct and encourage patient and family to use good hand hygiene technique  Goal: Absence of fever/infection during anticipated neutropenic period  Outcome: Progressing     Problem: Metabolic/Fluid and Electrolytes - Adult  Goal: Electrolytes maintained within normal limits  10/9/2022 0057 by Marita Painting RN  Outcome: Progressing  10/8/2022 1342 by Diana Charles RN  Outcome: Progressing  Flowsheets (Taken 10/8/2022 1342)  Electrolytes maintained within normal limits:   Monitor labs and assess patient for signs and symptoms of electrolyte imbalances   Monitor response to electrolyte replacements, including repeat lab results as appropriate   Instruct patient on fluid and nutrition restrictions as appropriate   Administer electrolyte replacement as ordered   Fluid restriction as ordered  Goal: Hemodynamic stability and optimal renal function maintained  10/9/2022 0057 by Marita Painting RN  Outcome: Progressing  10/8/2022 1342 by Diana Charles RN  Outcome: Progressing  Flowsheets (Taken 10/8/2022 1342)  Hemodynamic stability and optimal renal function maintained:   Monitor labs and assess for signs and symptoms of volume excess or deficit   Monitor intake, output and patient weight   Encourage oral intake as appropriate   Instruct patient on fluid and nutrition restrictions as appropriate   Monitor response to interventions for patient's volume status, including labs, urine output, blood pressure (other measures as available)  Goal: Glucose maintained within prescribed range  Outcome: Progressing     Problem: Hematologic - Adult  Goal: Maintains hematologic stability  10/9/2022 0057 by Sp Loving RN  Outcome: Progressing  10/8/2022 1342 by Lorenzo Downey RN  Outcome: Progressing  Flowsheets (Taken 10/8/2022 1342)  Maintains hematologic stability:   Assess for signs and symptoms of bleeding or hemorrhage   Monitor labs for bleeding or clotting disorders     Problem: Skin/Tissue Integrity  Goal: Absence of new skin breakdown  Description: 1. Monitor for areas of redness and/or skin breakdown  2. Assess vascular access sites hourly  3. Every 4-6 hours minimum:  Change oxygen saturation probe site  4. Every 4-6 hours:  If on nasal continuous positive airway pressure, respiratory therapy assess nares and determine need for appliance change or resting period.   10/9/2022 0057 by Sp Loving RN  Outcome: Progressing  10/8/2022 1342 by Lorenzo Downey RN  Outcome: Progressing  Note: Monitor q4 hr, encourage q2hr while in bed and chair     Problem: Chronic Conditions and Co-morbidities  Goal: Patient's chronic conditions and co-morbidity symptoms are monitored and maintained or improved  10/9/2022 0057 by Sp Loving RN  Outcome: Progressing  10/8/2022 1342 by Lorenzo Downey RN  Outcome: Progressing  Flowsheets (Taken 10/8/2022 1342)  Care Plan - Patient's Chronic Conditions and Co-Morbidity Symptoms are Monitored and Maintained or Improved:   Monitor and assess patient's chronic conditions and comorbid symptoms for stability, deterioration, or improvement   Collaborate with multidisciplinary team to address chronic and comorbid conditions and prevent exacerbation or deterioration   Update acute care plan with appropriate goals if chronic or comorbid symptoms are exacerbated and prevent overall improvement and discharge     Problem: Nutrition Deficit:  Goal: Optimize nutritional status  10/9/2022 0057 by Rob Beasley RN  Outcome: Progressing  10/8/2022 1342 by Yonis Tierney RN  Outcome: Progressing  Flowsheets (Taken 10/8/2022 1342)  Nutrient intake appropriate for improving, restoring, or maintaining nutritional needs:   Assess nutritional status and recommend course of action   Monitor oral intake, labs, and treatment plans   Recommend appropriate diets, oral nutritional supplements, and vitamin/mineral supplements   Provide specific nutrition education to patient or family as appropriate     Problem: Neurosensory - Adult  Goal: Achieves maximal functionality and self care  10/9/2022 0057 by Rob Beasley RN  Outcome: Progressing  Flowsheets (Taken 10/8/2022 1945)  Achieves maximal functionality and self care: Monitor swallowing and airway patency with patient fatigue and changes in neurological status  10/8/2022 1342 by Yonis Tierney RN  Outcome: Progressing  Flowsheets (Taken 10/8/2022 1342)  Achieves maximal functionality and self care:   Monitor swallowing and airway patency with patient fatigue and changes in neurological status   Encourage and assist patient to increase activity and self care with guidance from physical therapy/occupational therapy  Goal: Achieves stable or improved neurological status  10/9/2022 0057 by Rob Beasley RN  Outcome: Progressing  Flowsheets (Taken 10/8/2022 1945)  Achieves stable or improved neurological status: Assess for and report changes in neurological status  10/8/2022 1342 by Yonis Tierney RN  Outcome: Progressing  Flowsheets (Taken 10/8/2022 1342)  Achieves stable or improved neurological status:   Assess for and report changes in neurological status   Maintain blood pressure and fluid volume within ordered parameters to optimize cerebral perfusion and minimize risk of hemorrhage   Monitor temperature, glucose, and sodium.  Initiate appropriate interventions as ordered     Problem: Musculoskeletal - Adult  Goal: Return mobility to safest level of function  10/9/2022 0057 by Tania Chiang RN  Outcome: Progressing  10/8/2022 1342 by John Castaneda RN  Outcome: Progressing  Flowsheets (Taken 10/8/2022 1342)  Return Mobility to Safest Level of Function:   Assess patient stability and activity tolerance for standing, transferring and ambulating with or without assistive devices   Assist with transfers and ambulation using safe patient handling equipment as needed   Ensure adequate protection for wounds/incisions during mobilization   Instruct patient/family in ordered activity level  Goal: Maintain proper alignment of affected body part  10/9/2022 0057 by Tania Chiang RN  Outcome: Progressing  10/8/2022 1342 by John Castaneda RN  Outcome: Progressing  Flowsheets (Taken 10/8/2022 1342)  Maintain proper alignment of affected body part:   Support and protect limb and body alignment per provider's orders   Instruct and reinforce with patient and family use of appropriate assistive device and precautions (e.g. spinal or hip dislocation precautions)  Goal: Return ADL status to a safe level of function  10/9/2022 0057 by Tania Chiang RN  Outcome: Progressing  10/8/2022 1342 by John Castaneda RN  Outcome: Progressing  Flowsheets (Taken 10/8/2022 1342)  Return ADL Status to a Safe Level of Function:   Administer medication as ordered   Assess activities of daily living deficits and provide assistive devices as needed   Assist and instruct patient to increase activity and self care as tolerated     Problem: Gastrointestinal - Adult  Goal: Minimal or absence of nausea and vomiting  Outcome: Progressing  Goal: Maintains or returns to baseline bowel function  10/9/2022 0057 by Tania Chiang RN  Outcome: Progressing  Flowsheets (Taken 10/8/2022 1945)  Maintains or returns to baseline bowel function: Assess bowel function  10/8/2022 1342 by John Castaneda RN  Outcome: Progressing  Flowsheets (Taken 10/8/2022 1342)  Maintains or returns to baseline bowel function:   Assess bowel function   Encourage oral fluids to ensure adequate hydration   Administer ordered medications as needed   Encourage mobilization and activity   Nutrition consult to assist patient with appropriate food choices  Goal: Maintains adequate nutritional intake  10/9/2022 0057 by Abel Holden RN  Outcome: Progressing  Flowsheets (Taken 10/8/2022 1945)  Maintains adequate nutritional intake: Monitor intake and output, weight and lab values  10/8/2022 1342 by Flor Yeh RN  Outcome: Progressing  Flowsheets (Taken 10/8/2022 1342)  Maintains adequate nutritional intake:   Monitor percentage of each meal consumed   Monitor intake and output, weight and lab values   Identify factors contributing to decreased intake, treat as appropriate   Assist with meals as needed     Problem: Genitourinary - Adult  Goal: Absence of urinary retention  10/9/2022 0057 by Abel Holden RN  Outcome: Progressing  10/8/2022 1342 by Flor Yeh RN  Outcome: Progressing  Flowsheets (Taken 10/8/2022 1342)  Absence of urinary retention:   Assess patients ability to void and empty bladder   Monitor intake/output and perform bladder scan as needed  Goal: Urinary catheter remains patent  Outcome: Progressing

## 2022-10-09 NOTE — PROGRESS NOTES
[facility]   Nephrology progress note  10/9/2022, 1:09 PM      Pt Name:    Esha Christina  MRN:     [id number]     YOB: 1965  Admit Date:    9/28/2022  9:56 AM    Chief Complaint: Nephrology following for ESRD. Subjective:  Patient was seen and examined this morning  No acute distress  On room air  Chest tube in place  Denies any new complaints    Objective:  24HR INTAKE/OUTPUT:    Intake/Output Summary (Last 24 hours) at 10/9/2022 1309  Last data filed at 10/8/2022 1838  Gross per 24 hour   Intake 480 ml   Output 80 ml   Net 400 ml           I/O last 3 completed shifts: In: 1560 [P.O.:1560]  Out: 150 [Chest Tube:150]  No intake/output data recorded.    Admission weight: 250 lb (113.4 kg)  Wt Readings from Last 3 Encounters:   10/09/22 214 lb 4.6 oz (97.2 kg)   09/13/21 250 lb (113.4 kg)   07/26/21 240 lb (108.9 kg)        Vitals :   Vitals:    10/08/22 2330 10/09/22 0306 10/09/22 0716 10/09/22 1126   BP: (!) 156/63 116/64 (!) 165/70 113/62   Pulse: 87 86 80 84   Resp: 18 18 20 18   Temp: 98.8 °F (37.1 °C) 97.9 °F (36.6 °C) 97.7 °F (36.5 °C) 98.1 °F (36.7 °C)   TempSrc: Oral Oral Oral Oral   SpO2:  100% 94% 94%   Weight:  214 lb 4.6 oz (97.2 kg)     Height:           Physical examination  General Appearance: alert and cooperative with exam, appears comfortable, no distress  Mouth/Throat: Oral mucosa moist  Neck: No JVD  Lungs: diminished, right sided chest tube  Heart:  S1, S2 heard  GI: soft, non-tender  Extremities: improved LE edema, left arm AV fistula    Medications:  Infusion:    sodium chloride       Meds:    minoxidil  2.5 mg Oral BID    ketamine  100 mg IntraVENous Once    doxycycline hyclate  100 mg Oral 2 times per day    dexamethasone  0.5 mg Oral Daily    docusate sodium  100 mg Oral BID    senna  1 tablet Oral Nightly    [Held by provider] aspirin  81 mg Oral Daily    atorvastatin  40 mg Oral Nightly    cloNIDine  0.3 mg Oral TID    doxazosin  8 mg Oral Nightly famotidine  20 mg Oral Daily    ferrous sulfate  325 mg Oral Daily with breakfast    folic acid  9,050 mcg Oral Daily    fluticasone  1 spray Nasal Daily    isosorbide mononitrate  120 mg Oral Daily    sertraline  100 mg Oral Daily    verapamil  240 mg Oral Daily    multivitamin  1 tablet Oral Daily    sodium chloride flush  10 mL IntraVENous 2 times per day    heparin (porcine)  5,000 Units SubCUTAneous 3 times per day     Meds prn: hydrOXYzine HCl, traZODone, sodium chloride flush, sodium chloride, ondansetron **OR** ondansetron, polyethylene glycol, acetaminophen, HYDROcodone 5 mg - acetaminophen **OR** HYDROcodone 5 mg - acetaminophen, morphine     Lab Data :  CBC:   Recent Labs     10/07/22  0434 10/08/22  0347   WBC 4.9 4.8   HGB 9.9* 10.4*   HCT 31.8* 33.4*   * 125*       CMP:  Recent Labs     10/07/22  0434 10/08/22  0347 10/09/22  0425    137 137   K 5.4* 4.5 4.8    101 100   CO2 25 25 25   BUN 61* 34* 60*   CREATININE 9.4* 6.0* 9.0*   GLUCOSE 83 87 87   CALCIUM 9.2 8.8 9.1       Hepatic:   No results for input(s): LABALBU, AST, ALT, ALB, BILITOT, ALKPHOS in the last 72 hours. Assessment and Plan:    1. ESRD on HD   Hemodialysis treatment tomorrow morning  Patient scheduled for surgery after dialysis  Discussed with RN  No acute need for renal replacement therapy today  Electrolytes are stable    2. Hyperkalemia : Improved with low potassium bath  3. HTN: Much better controlled now  4. Loculated pleural effusion: will have thoracotomy tomorrow  5. Anemia in CKD  6. Secondary hyperparathyroidism  7. Chronic fluid overload    D/W patient and RN    Anthony Louie MD  [facility]    This report has been created using voice recognition software.  It may contain minor errors which are inherent in voice recognition technology

## 2022-10-09 NOTE — FLOWSHEET NOTE
10/09/22 1455   Safe Environment   Safety Measures Other (comment)  (virtual safety round complete)   Virtual RN rounds, staff at bedside with patient

## 2022-10-09 NOTE — FLOWSHEET NOTE
10/09/22 1811   Safe Environment   Safety Measures Other (comment)  (virtual safety round complete)   Virtual Nurse introduced, pt up in chair, call light in reach, denies needs at this time.

## 2022-10-09 NOTE — FLOWSHEET NOTE
10/09/22 1105   Safe Environment   Safety Measures Other (comment)  (virtual safety round complete)   Virtual RN rounds, patient denies needs at this time

## 2022-10-09 NOTE — PROGRESS NOTES
Hospitalist Progress Note    Patient:  Sindi Aaron      Unit/Bed:4K-14/014-A    YOB: 1965    MRN: [id number]       Acct: [de-identified]     PCP: Mendoza Oconnor MD    Date of Admission: 9/28/2022    Assessment/Plan:    1) Pleural effusion, right: Multifactorial; likely secondary to hemodialysis noncompliance. S/p chest tube 09/28. S/p chemical pneumolysis 10/04 with tPA and dornase injection. S/p bronchoscopy 10/06. Pleural studies indicating exudative effusion with cytology negative for malignant cells. -CT surgery and pulmonology following    2) Trapped lung, right: S/p chest tube 09/28. S/p chemical pneumolysis 10/04 with tPA and dornase injection. S/p bronchoscopy 10/06. -CT surgery and pulmonology following  -Plan for right thoracotomy and decortication with partial lung dissection tomorrow (31/39/24) with Dr. Massiel Nino after dialysis. 3) Right sided pulmonary consolidations: Noted on CTA of the chest on 10/02. Associated with exudative pleural effusion. See above. On day 6 of doxycycline 100 mg oral BID (10/4 to 11/2) and day 6 of Decadron 0.5 mg oral daily per pulmonology recommendation.  -Repeat imaging to assess for interval changes per pulmonology; last CXR 10/09/2022 showed signs of right basilar pleural effusion and slightly progressed/worsened consolidation throughout right lung base.  -Plan for CT intervention on 10/10 after dialysis  -Incentive spirometry, bronchodilation per pulmonology recommendations    4) Pneumothorax, right lower lobe: Noted on CXR on 10/07.  -Chest tube in place  -Advance care recommendations per pulmonology  -Continue to monitor for signs or symptoms of worsening respiratory function    5) Acute hypoxic respiratory failure: Resolved. Likely secondary to pleural effusion as well as Hx polysubstance abuse. Patient maintaining adequate oxygenation on room air.  -Titrate to maintain SPO2 92 to 96%  -Incentive spirometer     6) ESRD, on hemodialysis: On MWF dialysis schedule. -Nephrology following  -Low phosphorus and low potassium diet  -Judicious IV fluids and diuresis per nephrology recommendations  -Strict I's and O's  -S/p albumin infusion 10/07    7) Primary HTN with current hypotension:  -Continue to monitor vital signs  -Continue with clonidine, verapamil, minoxidil, and doxazosin regimen with hold parameters    8) Systolic heart failure with preserved ejection fraction: Echo 9/29 showed increased left ventricular wall thickness with EF 60%. -Strict I's and O's, daily weights  -Judicious IV fluids and diuresis per nephrology  -Continue Imdur    9) Hypertrophic cardiomyopathy: Echo 9/29 showed severe left ventricular concentric hypertrophy as well as dilation of the left atrium  -Continue aspirin, statin, isosorbide mononitrate, verapamil, minoxidil  -Strict I's and O's, daily weights    10) PTSD:  -Continue sertraline, Atarax, trazodone    11) Unspecified depressive disorder:  -Continue sertraline    12) GERD:  -Continue PPI    13) Polysubstance abuse:  -Patient counseled on substance abuse cessation    14) HLD:  -Continue atorvastatin    15) Anemia of chronic disease:  -Continue to monitor with CBC  -Continue iron and folate supplementation    16) REZA:  - Patient tolerating BiPAP at night  - Recommendation for outpatient follow-up for PAP titration with sleep clinic    17) Thoracic spondylosis:  -Noted on CTA of the chest on 10/02  -PT/OT consulted; 10/05 [facility] 15    18) Hyperkalemia: Resolved. -Currently on MWF dialysis schedule  -Continue low potassium diet  -Continue to monitor with BMP    19) Hyperphosphatemia: Resolved.   -Currently on MWF dialysis schedule  -Continue low phosphorus diet  -Continue home phosphorus binder      Hx Thoracic Aneurysm  Hx AAA s/p aortic stent graft 7/2018      Expected discharge date:  TBD    Disposition:    [x] Home       [] TCU       [] Rehab       [] Psych       [] SNF       [] [place]       [] Other-    Chief Complaint: Shortness of breath    Hospital Course:   Per previous HPI Jenni Perez is a 56year-old -American male who presented to [address] [place] [address] on 09/28/2022 with worsening shortness of breath at rest and with exertion. CXR showed large right pleural effusion and patient required PAP therapy for adequate oxygenation. Patient was admitted and chest tube placed on 09/28 with 2 L of serosanguineous fluid drainage. Dialysis also performed at 09/28 with 2 L of fluid. Repeat dialysis performed on 09/29 with albumin administration. Repeat dialysis performed on 09/30/2022. Pleural fluid analysis was consistent with exudative effusion with negative cytology. Repeat CTA of the chest showed improvement in the right-sided pleural effusion, but showed evidence of a right apical pneumothorax which resolved on subsequent imaging. Repeat dialysis performed on 10/03 with 2 L removed. Dornase and alteplase administered through chest tube on 10/04 by intensivist. Hemodialysis performed on 10/05 with 2 L of fluid removed. Bronchoscopy performed on 93/01 without complication. Plan for right thoracotomy and decortication with partial lung dissection tomorrow (58/16/27) with Dr. Edgardo Pedro after dialysis. Nephrology, Pulmonology, and CT Surgery following. Subjective (past 24 hours):   Patient seen and examined this morning. He states he is doing well and had no issues overnight. He denies have any chest pain, abdominal pain, nausea, vomiting, fever or chills. Plan for thoracotomy tomorrow. ROS (12 point review of systems completed. Pertinent positives noted. Otherwise ROS is negative).     Medications:  Reviewed    Infusion Medications    sodium chloride       Scheduled Medications    minoxidil  2.5 mg Oral BID    ketamine  100 mg IntraVENous Once    doxycycline hyclate  100 mg Oral 2 times per day    dexamethasone  0.5 mg Oral Daily    docusate sodium  100 mg Oral BID    senna  1 tablet Oral Nightly    aspirin  81 mg Oral Daily    atorvastatin  40 mg Oral Nightly    cloNIDine  0.3 mg Oral TID    doxazosin  8 mg Oral Nightly    famotidine  20 mg Oral Daily    ferrous sulfate  325 mg Oral Daily with breakfast    folic acid  6,394 mcg Oral Daily    fluticasone  1 spray Nasal Daily    isosorbide mononitrate  120 mg Oral Daily    sertraline  100 mg Oral Daily    verapamil  240 mg Oral Daily    multivitamin  1 tablet Oral Daily    sodium chloride flush  10 mL IntraVENous 2 times per day    heparin (porcine)  5,000 Units SubCUTAneous 3 times per day     PRN Meds: hydrOXYzine HCl, traZODone, sodium chloride flush, sodium chloride, ondansetron **OR** ondansetron, polyethylene glycol, acetaminophen, HYDROcodone 5 mg - acetaminophen **OR** HYDROcodone 5 mg - acetaminophen, morphine      Intake/Output Summary (Last 24 hours) at 10/9/2022 1239  Last data filed at 10/8/2022 1838  Gross per 24 hour   Intake 960 ml   Output 80 ml   Net 880 ml       Diet:  ADULT DIET; Regular; 5 carb choices (75 gm/meal); Low Fat/Low Chol/High Fiber/2 gm Na; Low Sodium (2 gm); Low Potassium (Less than 3000 mg/day); Low Phosphorus (Less than 1000 mg)  ADULT ORAL NUTRITION SUPPLEMENT; Breakfast, Lunch; Renal Oral Supplement  Diet NPO    Exam:  /62   Pulse 84   Temp 98.1 °F (36.7 °C) (Oral)   Resp 18   Ht 6' 3\" (1.905 m)   Wt 214 lb 4.6 oz (97.2 kg)   SpO2 94%   BMI 26.78 kg/m²     General appearance: No apparent distress, appears stated age and cooperative. HEENT: Pupils equal, round, and reactive to light. Conjunctivae/corneas clear. Neck: Supple, with full range of motion. No jugular venous distention. Trachea midline. Respiratory:  Normal respiratory effort. Clear to auscultation, bilaterally without Rales/Wheezes/Rhonchi. Chest tube in place. Cardiovascular: Regular rate and rhythm with normal S1/S2 without murmurs, rubs or gallops. Abdomen: Soft, non-tender, non-distended with normal bowel sounds.   Musculoskeletal: passive and active ROM x 4 extremities. Skin: Skin color, texture, turgor normal.  No rashes or lesions. Neurologic:  Neurovascularly intact without any focal sensory/motor deficits. Psychiatric: Alert and oriented, thought content appropriate, normal insight  Capillary Refill: Brisk,< 3 seconds   Peripheral Pulses: +2 palpable, equal bilaterally       Labs:   Recent Labs     10/07/22  0434 10/08/22  0347   WBC 4.9 4.8   HGB 9.9* 10.4*   HCT 31.8* 33.4*   * 125*     Recent Labs     10/07/22  0434 10/08/22  0347 10/09/22  0425    137 137   K 5.4* 4.5 4.8    101 100   CO2 25 25 25   BUN 61* 34* 60*   CREATININE 9.4* 6.0* 9.0*   CALCIUM 9.2 8.8 9.1     No results for input(s): AST, ALT, BILIDIR, BILITOT, ALKPHOS in the last 72 hours. No results for input(s): INR in the last 72 hours. No results for input(s): Randene Holes in the last 72 hours. Microbiology:      Urinalysis:      Lab Results   Component Value Date/Time    NITRU NEGATIVE 04/03/2018 07:45 PM    WBCUA 2-4 04/03/2018 07:45 PM    BACTERIA NONE 04/03/2018 07:45 PM    RBCUA 5-10 04/03/2018 07:45 PM    BLOODU SMALL 04/03/2018 07:45 PM    SPECGRAV 1.014 03/07/2018 09:10 PM    GLUCOSEU NEGATIVE 04/03/2018 07:45 PM       Radiology:  XR CHEST PORTABLE   Final Result   1. Unchanged position of medium bore right chest tube coiled within the    right lung base. Increasing size of right basilar pleural effusion now    moderate/large. Consolidation throughout the right lung base also slightly    progressed/worsened. This document has been electronically signed by: Bessie Cardenas DO on    10/09/2022 02:55 AM      XR CHEST PORTABLE   Final Result      No significant interval change. This document has been electronically signed by: Farheen Ramirez MD on    10/08/2022 04:55 AM      XR CHEST PORTABLE   Final Result   No significant interval change.       This document has been electronically signed by: Linette Mario MD on    10/07/2022 07:42 PM      XR CHEST PORTABLE   Final Result      Small pneumothorax at the right lung base. Chest tube tip is at the right    lung base, grossly unchanged. Small right pleural effusion, decreased. Right lung base consolidation, increased, suspicious for atelectasis or    pneumonia. Cardiomegaly. This document has been electronically signed by: Cami Chavez MD on    10/05/2022 03:41 AM         XR CHEST PORTABLE   Final Result   1. Persistent pleural-parenchymal changes in the right mid and lower lung zone. There is a chest tube in place. No pneumothorax is identified on today's film. 2. Hazy appearance to the left mid and lower lung zone. This is unchanged. **This report has been created using voice recognition software. It may contain minor errors which are inherent in voice recognition technology. **      Final report electronically signed by Dr. Debra Luna on 10/4/2022 7:57 AM      XR CHEST PORTABLE   Final Result   1. Aneurysmal aortic arch. An aortic endograft is present in the aortic arch and proximal descending thoracic aorta. 2. Moderate cardiomegaly. Small caliber pigtail catheter in the pleural space right side. Small pleural effusion right side. Moderate right basilar atelectasis/pneumonia. 3. Small less than 10% pneumothorax right apex. [facility] **This report has been created using voice recognition software. It may contain minor errors which are inherent in voice recognition technology. **      Final report electronically signed by Dr. Bert Grey on 10/3/2022 1:38 PM      CTA CHEST W WO CONTRAST   Final Result       1. Endoluminal graft in the thoracic aorta. This appears slightly smaller than on remote study dated 2/26/2020. There is no definite evidence of an endoleak. Please correlate clinically. 2. Cardiomegaly. Mitral valve calcification. 3. Right-sided chest tube. Small pneumothorax and effusion at the right lung base. Right lower lobe infiltrate.    4. Evidence for granulomatous disease in the right lower lobe, right hilum and mediastinum. 5. Thoracic spondylosis. 6. Old granulomatous disease in the spleen. 7. Small kidneys bilaterally. **This report has been created using voice recognition software. It may contain minor errors which are inherent in voice recognition technology. **      Final report electronically signed by DR Brendan Root on 10/2/2022 11:48 AM      XR CHEST PORTABLE   Final Result   Impression:   Stable moderate right and small left effusions. No definite residual    pneumothorax. Stable right basilar atelectasis vs infiltrate. This document has been electronically signed by: Dave Chan MD on    10/02/2022 05:33 AM      XR CHEST PORTABLE   Final Result   1. Stable chest x-ray, no interval change since previous study dated 28th of September 2022. Yovany Melvin **This report has been created using voice recognition software. It may contain minor errors which are inherent in voice recognition technology. **      Final report electronically signed by DR Brendan Root on 10/1/2022 11:48 AM      CT CHEST W CONTRAST   Final Result   1. A small right pneumothorax with a pigtail catheter catheter within it. Small residual right pleural effusion. 2. Status post endograft placement within the thoracic aorta. 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 A CT angiography of the chest without and with contrast    is recommended to better evaluate whether these represent calcifications or endoleak. 3. Masslike abnormalities are seen in the right lower lobe and right middle lobe that represent rounded atelectasis. 4. Other findings as described above. **This report has been created using voice recognition software. It may contain minor errors which are inherent in voice recognition technology. **      Final report electronically signed by  Cami Boyd on 9/30/2022 10:27 AM      XR CHEST PORTABLE   Final Result   1. New right-sided chest tube in place. Diminution in the abnormal density at the right lung base. Small pneumothorax at the right lung base. 2. Abnormal densities in the right and left lower lobes. 3. Cardiomegaly. 4. Aortic stent graft in place. .               **This report has been created using voice recognition software. It may contain minor errors which are inherent in voice recognition technology. **      Final report electronically signed by DR Milta Severe on 9/28/2022 4:55 PM      XR CHEST PORTABLE   Final Result   1. Large right pleural effusion with associated compressive atelectasis or right lower lobe collapse. 2. Mild increase groundglass opacities in the left lung are nonspecific but can suggest underlying pulmonary edema or pneumonia. 3. Other findings as described above. **This report has been created using voice recognition software. It may contain minor errors which are inherent in voice recognition technology. **      Final report electronically signed by Dr Cami Boyd on 9/28/2022 10:32 AM      XR CHEST PORTABLE    (Results Pending)       DVT prophylaxis: [] Lovenox                                 [] SCDs                                 [x] SQ Heparin                                 [] Encourage ambulation           [] Already on Anticoagulation     Code Status: Full Code    PT/OT Eval Status:  Following    Tele:   [x] yes             [] no    Electronically signed by Juan Marshall DO on 10/9/2022 at 12:39 PM

## 2022-10-10 ENCOUNTER — APPOINTMENT (OUTPATIENT)
Dept: GENERAL RADIOLOGY | Age: 57
DRG: 163 | End: 2022-10-10
Payer: MEDICARE

## 2022-10-10 ENCOUNTER — ANESTHESIA (OUTPATIENT)
Dept: OPERATING ROOM | Age: 57
DRG: 163 | End: 2022-10-10
Payer: MEDICARE

## 2022-10-10 ENCOUNTER — ANESTHESIA EVENT (OUTPATIENT)
Dept: OPERATING ROOM | Age: 57
DRG: 163 | End: 2022-10-10
Payer: MEDICARE

## 2022-10-10 LAB
ANION GAP SERPL CALCULATED.3IONS-SCNC: 12 MEQ/L (ref 8–16)
BUN BLDV-MCNC: 77 MG/DL (ref 7–22)
CALCIUM SERPL-MCNC: 9.3 MG/DL (ref 8.5–10.5)
CHLORIDE BLD-SCNC: 99 MEQ/L (ref 98–111)
CO2: 24 MEQ/L (ref 23–33)
CREAT SERPL-MCNC: 10.6 MG/DL (ref 0.4–1.2)
GFR SERPL CREATININE-BSD FRML MDRD: 6 ML/MIN/1.73M2
GLUCOSE BLD-MCNC: 93 MG/DL (ref 70–108)
POTASSIUM SERPL-SCNC: 5.2 MEQ/L (ref 3.5–5.2)
SODIUM BLD-SCNC: 135 MEQ/L (ref 135–145)

## 2022-10-10 PROCEDURE — 0BNF0ZZ RELEASE RIGHT LOWER LUNG LOBE, OPEN APPROACH: ICD-10-PCS | Performed by: THORACIC SURGERY (CARDIOTHORACIC VASCULAR SURGERY)

## 2022-10-10 PROCEDURE — 3600000014 HC SURGERY LEVEL 4 ADDTL 15MIN: Performed by: THORACIC SURGERY (CARDIOTHORACIC VASCULAR SURGERY)

## 2022-10-10 PROCEDURE — C2618 PROBE/NEEDLE, CRYO: HCPCS | Performed by: THORACIC SURGERY (CARDIOTHORACIC VASCULAR SURGERY)

## 2022-10-10 PROCEDURE — 87147 CULTURE TYPE IMMUNOLOGIC: CPT

## 2022-10-10 PROCEDURE — 87077 CULTURE AEROBIC IDENTIFY: CPT

## 2022-10-10 PROCEDURE — 7100000001 HC PACU RECOVERY - ADDTL 15 MIN: Performed by: THORACIC SURGERY (CARDIOTHORACIC VASCULAR SURGERY)

## 2022-10-10 PROCEDURE — 87205 SMEAR GRAM STAIN: CPT

## 2022-10-10 PROCEDURE — 6370000000 HC RX 637 (ALT 250 FOR IP): Performed by: INTERNAL MEDICINE

## 2022-10-10 PROCEDURE — 3700000000 HC ANESTHESIA ATTENDED CARE: Performed by: THORACIC SURGERY (CARDIOTHORACIC VASCULAR SURGERY)

## 2022-10-10 PROCEDURE — 0BNK3ZZ RELEASE RIGHT LUNG, PERCUTANEOUS APPROACH: ICD-10-PCS | Performed by: THORACIC SURGERY (CARDIOTHORACIC VASCULAR SURGERY)

## 2022-10-10 PROCEDURE — 87186 SC STD MICRODIL/AGAR DIL: CPT

## 2022-10-10 PROCEDURE — 6360000002 HC RX W HCPCS: Performed by: NURSE ANESTHETIST, CERTIFIED REGISTERED

## 2022-10-10 PROCEDURE — 90935 HEMODIALYSIS ONE EVALUATION: CPT | Performed by: INTERNAL MEDICINE

## 2022-10-10 PROCEDURE — C1729 CATH, DRAINAGE: HCPCS | Performed by: THORACIC SURGERY (CARDIOTHORACIC VASCULAR SURGERY)

## 2022-10-10 PROCEDURE — 2500000003 HC RX 250 WO HCPCS: Performed by: STUDENT IN AN ORGANIZED HEALTH CARE EDUCATION/TRAINING PROGRAM

## 2022-10-10 PROCEDURE — 6360000002 HC RX W HCPCS: Performed by: INTERNAL MEDICINE

## 2022-10-10 PROCEDURE — 3700000001 HC ADD 15 MINUTES (ANESTHESIA): Performed by: THORACIC SURGERY (CARDIOTHORACIC VASCULAR SURGERY)

## 2022-10-10 PROCEDURE — 3600000004 HC SURGERY LEVEL 4 BASE: Performed by: THORACIC SURGERY (CARDIOTHORACIC VASCULAR SURGERY)

## 2022-10-10 PROCEDURE — 87070 CULTURE OTHR SPECIMN AEROBIC: CPT

## 2022-10-10 PROCEDURE — 6360000002 HC RX W HCPCS

## 2022-10-10 PROCEDURE — 6370000000 HC RX 637 (ALT 250 FOR IP)

## 2022-10-10 PROCEDURE — 36415 COLL VENOUS BLD VENIPUNCTURE: CPT

## 2022-10-10 PROCEDURE — 2500000003 HC RX 250 WO HCPCS: Performed by: THORACIC SURGERY (CARDIOTHORACIC VASCULAR SURGERY)

## 2022-10-10 PROCEDURE — 2709999900 HC NON-CHARGEABLE SUPPLY: Performed by: THORACIC SURGERY (CARDIOTHORACIC VASCULAR SURGERY)

## 2022-10-10 PROCEDURE — 80048 BASIC METABOLIC PNL TOTAL CA: CPT

## 2022-10-10 PROCEDURE — 6370000000 HC RX 637 (ALT 250 FOR IP): Performed by: PHYSICIAN ASSISTANT

## 2022-10-10 PROCEDURE — 6370000000 HC RX 637 (ALT 250 FOR IP): Performed by: STUDENT IN AN ORGANIZED HEALTH CARE EDUCATION/TRAINING PROGRAM

## 2022-10-10 PROCEDURE — 71045 X-RAY EXAM CHEST 1 VIEW: CPT

## 2022-10-10 PROCEDURE — 32505 WEDGE RESECT OF LUNG INITIAL: CPT | Performed by: PHYSICIAN ASSISTANT

## 2022-10-10 PROCEDURE — 0BND0ZZ RELEASE RIGHT MIDDLE LUNG LOBE, OPEN APPROACH: ICD-10-PCS | Performed by: THORACIC SURGERY (CARDIOTHORACIC VASCULAR SURGERY)

## 2022-10-10 PROCEDURE — 87075 CULTR BACTERIA EXCEPT BLOOD: CPT

## 2022-10-10 PROCEDURE — 2580000003 HC RX 258: Performed by: PHYSICIAN ASSISTANT

## 2022-10-10 PROCEDURE — 88307 TISSUE EXAM BY PATHOLOGIST: CPT

## 2022-10-10 PROCEDURE — 90935 HEMODIALYSIS ONE EVALUATION: CPT

## 2022-10-10 PROCEDURE — 2580000003 HC RX 258: Performed by: NURSE ANESTHETIST, CERTIFIED REGISTERED

## 2022-10-10 PROCEDURE — 2580000003 HC RX 258

## 2022-10-10 PROCEDURE — 2060000000 HC ICU INTERMEDIATE R&B

## 2022-10-10 PROCEDURE — 99232 SBSQ HOSP IP/OBS MODERATE 35: CPT | Performed by: INTERNAL MEDICINE

## 2022-10-10 PROCEDURE — 6370000000 HC RX 637 (ALT 250 FOR IP): Performed by: NURSE PRACTITIONER

## 2022-10-10 PROCEDURE — 0BNC0ZZ RELEASE RIGHT UPPER LUNG LOBE, OPEN APPROACH: ICD-10-PCS | Performed by: THORACIC SURGERY (CARDIOTHORACIC VASCULAR SURGERY)

## 2022-10-10 PROCEDURE — 2500000003 HC RX 250 WO HCPCS: Performed by: NURSE ANESTHETIST, CERTIFIED REGISTERED

## 2022-10-10 PROCEDURE — 32506 WEDGE RESECT OF LUNG ADD-ON: CPT | Performed by: PHYSICIAN ASSISTANT

## 2022-10-10 PROCEDURE — 7100000000 HC PACU RECOVERY - FIRST 15 MIN: Performed by: THORACIC SURGERY (CARDIOTHORACIC VASCULAR SURGERY)

## 2022-10-10 PROCEDURE — 2720000010 HC SURG SUPPLY STERILE: Performed by: THORACIC SURGERY (CARDIOTHORACIC VASCULAR SURGERY)

## 2022-10-10 RX ORDER — FENTANYL CITRATE 50 UG/ML
INJECTION, SOLUTION INTRAMUSCULAR; INTRAVENOUS PRN
Status: DISCONTINUED | OUTPATIENT
Start: 2022-10-10 | End: 2022-10-10 | Stop reason: SDUPTHER

## 2022-10-10 RX ORDER — ROCURONIUM BROMIDE 10 MG/ML
INJECTION, SOLUTION INTRAVENOUS PRN
Status: DISCONTINUED | OUTPATIENT
Start: 2022-10-10 | End: 2022-10-10 | Stop reason: SDUPTHER

## 2022-10-10 RX ORDER — ONDANSETRON 2 MG/ML
4 INJECTION INTRAMUSCULAR; INTRAVENOUS EVERY 6 HOURS PRN
Status: DISCONTINUED | OUTPATIENT
Start: 2022-10-10 | End: 2022-10-15 | Stop reason: HOSPADM

## 2022-10-10 RX ORDER — ONDANSETRON 2 MG/ML
4 INJECTION INTRAMUSCULAR; INTRAVENOUS
Status: DISCONTINUED | OUTPATIENT
Start: 2022-10-10 | End: 2022-10-10 | Stop reason: HOSPADM

## 2022-10-10 RX ORDER — PROPOFOL 10 MG/ML
INJECTION, EMULSION INTRAVENOUS PRN
Status: DISCONTINUED | OUTPATIENT
Start: 2022-10-10 | End: 2022-10-10 | Stop reason: SDUPTHER

## 2022-10-10 RX ORDER — SODIUM CHLORIDE 0.9 % (FLUSH) 0.9 %
5-40 SYRINGE (ML) INJECTION PRN
Status: DISCONTINUED | OUTPATIENT
Start: 2022-10-10 | End: 2022-10-15 | Stop reason: HOSPADM

## 2022-10-10 RX ORDER — SODIUM CHLORIDE 9 MG/ML
INJECTION, SOLUTION INTRAVENOUS PRN
Status: DISCONTINUED | OUTPATIENT
Start: 2022-10-10 | End: 2022-10-15 | Stop reason: HOSPADM

## 2022-10-10 RX ORDER — MORPHINE SULFATE 2 MG/ML
1 INJECTION, SOLUTION INTRAMUSCULAR; INTRAVENOUS
Status: DISCONTINUED | OUTPATIENT
Start: 2022-10-10 | End: 2022-10-15 | Stop reason: HOSPADM

## 2022-10-10 RX ORDER — SODIUM CHLORIDE 9 MG/ML
INJECTION, SOLUTION INTRAVENOUS PRN
Status: DISCONTINUED | OUTPATIENT
Start: 2022-10-10 | End: 2022-10-10 | Stop reason: HOSPADM

## 2022-10-10 RX ORDER — MORPHINE SULFATE 2 MG/ML
2 INJECTION, SOLUTION INTRAMUSCULAR; INTRAVENOUS
Status: DISCONTINUED | OUTPATIENT
Start: 2022-10-10 | End: 2022-10-15 | Stop reason: HOSPADM

## 2022-10-10 RX ORDER — PROMETHAZINE HYDROCHLORIDE 25 MG/1
12.5 TABLET ORAL EVERY 6 HOURS PRN
Status: DISCONTINUED | OUTPATIENT
Start: 2022-10-10 | End: 2022-10-15 | Stop reason: HOSPADM

## 2022-10-10 RX ORDER — OXYCODONE HYDROCHLORIDE AND ACETAMINOPHEN 5; 325 MG/1; MG/1
1 TABLET ORAL EVERY 4 HOURS PRN
Status: DISCONTINUED | OUTPATIENT
Start: 2022-10-10 | End: 2022-10-15 | Stop reason: HOSPADM

## 2022-10-10 RX ORDER — DIPHENHYDRAMINE HYDROCHLORIDE 50 MG/ML
12.5 INJECTION INTRAMUSCULAR; INTRAVENOUS
Status: DISCONTINUED | OUTPATIENT
Start: 2022-10-10 | End: 2022-10-10 | Stop reason: HOSPADM

## 2022-10-10 RX ORDER — BUPIVACAINE HYDROCHLORIDE 5 MG/ML
INJECTION, SOLUTION PERINEURAL PRN
Status: DISCONTINUED | OUTPATIENT
Start: 2022-10-10 | End: 2022-10-10 | Stop reason: ALTCHOICE

## 2022-10-10 RX ORDER — SODIUM CHLORIDE 0.9 % (FLUSH) 0.9 %
5-40 SYRINGE (ML) INJECTION PRN
Status: DISCONTINUED | OUTPATIENT
Start: 2022-10-10 | End: 2022-10-10 | Stop reason: HOSPADM

## 2022-10-10 RX ORDER — FENTANYL CITRATE 50 UG/ML
50 INJECTION, SOLUTION INTRAMUSCULAR; INTRAVENOUS EVERY 5 MIN PRN
Status: DISCONTINUED | OUTPATIENT
Start: 2022-10-10 | End: 2022-10-10 | Stop reason: HOSPADM

## 2022-10-10 RX ORDER — ALBUTEROL SULFATE 90 UG/1
2 AEROSOL, METERED RESPIRATORY (INHALATION) EVERY 6 HOURS PRN
Status: DISCONTINUED | OUTPATIENT
Start: 2022-10-10 | End: 2022-10-15 | Stop reason: HOSPADM

## 2022-10-10 RX ORDER — SODIUM CHLORIDE 0.9 % (FLUSH) 0.9 %
5-40 SYRINGE (ML) INJECTION EVERY 12 HOURS SCHEDULED
Status: DISCONTINUED | OUTPATIENT
Start: 2022-10-10 | End: 2022-10-10 | Stop reason: HOSPADM

## 2022-10-10 RX ORDER — CEFAZOLIN SODIUM 1 G/3ML
INJECTION, POWDER, FOR SOLUTION INTRAMUSCULAR; INTRAVENOUS PRN
Status: DISCONTINUED | OUTPATIENT
Start: 2022-10-10 | End: 2022-10-10 | Stop reason: SDUPTHER

## 2022-10-10 RX ORDER — SODIUM CHLORIDE 9 MG/ML
INJECTION, SOLUTION INTRAVENOUS CONTINUOUS
Status: ACTIVE | OUTPATIENT
Start: 2022-10-10 | End: 2022-10-11

## 2022-10-10 RX ORDER — SODIUM CHLORIDE 9 MG/ML
INJECTION, SOLUTION INTRAVENOUS CONTINUOUS PRN
Status: DISCONTINUED | OUTPATIENT
Start: 2022-10-10 | End: 2022-10-10 | Stop reason: SDUPTHER

## 2022-10-10 RX ORDER — MEPERIDINE HYDROCHLORIDE 25 MG/ML
12.5 INJECTION INTRAMUSCULAR; INTRAVENOUS; SUBCUTANEOUS EVERY 5 MIN PRN
Status: DISCONTINUED | OUTPATIENT
Start: 2022-10-10 | End: 2022-10-10 | Stop reason: HOSPADM

## 2022-10-10 RX ORDER — OXYCODONE HYDROCHLORIDE AND ACETAMINOPHEN 5; 325 MG/1; MG/1
2 TABLET ORAL EVERY 4 HOURS PRN
Status: DISCONTINUED | OUTPATIENT
Start: 2022-10-10 | End: 2022-10-15 | Stop reason: HOSPADM

## 2022-10-10 RX ORDER — SODIUM CHLORIDE 0.9 % (FLUSH) 0.9 %
5-40 SYRINGE (ML) INJECTION EVERY 12 HOURS SCHEDULED
Status: DISCONTINUED | OUTPATIENT
Start: 2022-10-10 | End: 2022-10-15 | Stop reason: HOSPADM

## 2022-10-10 RX ORDER — PHENYLEPHRINE HYDROCHLORIDE 10 MG/ML
INJECTION INTRAVENOUS PRN
Status: DISCONTINUED | OUTPATIENT
Start: 2022-10-10 | End: 2022-10-10 | Stop reason: SDUPTHER

## 2022-10-10 RX ORDER — POLYETHYLENE GLYCOL 3350 17 G
4 POWDER IN PACKET (EA) ORAL PRN
Status: DISCONTINUED | OUTPATIENT
Start: 2022-10-10 | End: 2022-10-15 | Stop reason: HOSPADM

## 2022-10-10 RX ORDER — GLYCOPYRROLATE 0.2 MG/ML
INJECTION INTRAMUSCULAR; INTRAVENOUS PRN
Status: DISCONTINUED | OUTPATIENT
Start: 2022-10-10 | End: 2022-10-10 | Stop reason: SDUPTHER

## 2022-10-10 RX ADMIN — SODIUM CHLORIDE, PRESERVATIVE FREE 10 ML: 5 INJECTION INTRAVENOUS at 12:00

## 2022-10-10 RX ADMIN — PHENYLEPHRINE HYDROCHLORIDE 100 MCG: 10 INJECTION INTRAVENOUS at 15:04

## 2022-10-10 RX ADMIN — FENTANYL CITRATE 100 MCG: 50 INJECTION INTRAMUSCULAR; INTRAVENOUS at 17:55

## 2022-10-10 RX ADMIN — ROCURONIUM BROMIDE 10 MG: 10 INJECTION, SOLUTION INTRAVENOUS at 17:03

## 2022-10-10 RX ADMIN — CLONIDINE HYDROCHLORIDE 0.3 MG: 0.2 TABLET ORAL at 11:54

## 2022-10-10 RX ADMIN — DEXAMETHASONE 0.5 MG: 0.5 TABLET ORAL at 11:55

## 2022-10-10 RX ADMIN — ROCURONIUM BROMIDE 50 MG: 10 INJECTION, SOLUTION INTRAVENOUS at 14:09

## 2022-10-10 RX ADMIN — MINOXIDIL 2.5 MG: 2.5 TABLET ORAL at 20:14

## 2022-10-10 RX ADMIN — PROPOFOL 150 MG: 10 INJECTION, EMULSION INTRAVENOUS at 14:09

## 2022-10-10 RX ADMIN — DOXYCYCLINE HYCLATE 100 MG: 100 TABLET, COATED ORAL at 20:15

## 2022-10-10 RX ADMIN — SODIUM CHLORIDE, PRESERVATIVE FREE 10 ML: 5 INJECTION INTRAVENOUS at 20:15

## 2022-10-10 RX ADMIN — SUGAMMADEX 200 MG: 100 INJECTION, SOLUTION INTRAVENOUS at 17:54

## 2022-10-10 RX ADMIN — OXYCODONE AND ACETAMINOPHEN 2 TABLET: 5; 325 TABLET ORAL at 23:51

## 2022-10-10 RX ADMIN — HYDROCODONE BITARTRATE AND ACETAMINOPHEN 2 TABLET: 5; 325 TABLET ORAL at 20:04

## 2022-10-10 RX ADMIN — SODIUM CHLORIDE: 9 INJECTION, SOLUTION INTRAVENOUS at 18:25

## 2022-10-10 RX ADMIN — SERTRALINE 100 MG: 100 TABLET, FILM COATED ORAL at 11:54

## 2022-10-10 RX ADMIN — VERAPAMIL HYDROCHLORIDE 240 MG: 240 TABLET, FILM COATED, EXTENDED RELEASE ORAL at 11:54

## 2022-10-10 RX ADMIN — SODIUM CHLORIDE: 9 INJECTION, SOLUTION INTRAVENOUS at 13:59

## 2022-10-10 RX ADMIN — TRAZODONE HYDROCHLORIDE 50 MG: 50 TABLET ORAL at 22:20

## 2022-10-10 RX ADMIN — MINOXIDIL 2.5 MG: 2.5 TABLET ORAL at 11:54

## 2022-10-10 RX ADMIN — GLYCOPYRROLATE 0.2 MG: 0.2 INJECTION, SOLUTION INTRAMUSCULAR; INTRAVENOUS at 14:37

## 2022-10-10 RX ADMIN — FENTANYL CITRATE 50 MCG: 50 INJECTION INTRAMUSCULAR; INTRAVENOUS at 14:09

## 2022-10-10 RX ADMIN — ROCURONIUM BROMIDE 20 MG: 10 INJECTION, SOLUTION INTRAVENOUS at 15:23

## 2022-10-10 RX ADMIN — CEFAZOLIN SODIUM 2000 G: 1 INJECTION, POWDER, FOR SOLUTION INTRAMUSCULAR; INTRAVENOUS at 15:10

## 2022-10-10 RX ADMIN — SUGAMMADEX 200 MG: 100 INJECTION, SOLUTION INTRAVENOUS at 18:10

## 2022-10-10 RX ADMIN — ATORVASTATIN CALCIUM 40 MG: 40 TABLET, FILM COATED ORAL at 20:15

## 2022-10-10 RX ADMIN — PHENYLEPHRINE HYDROCHLORIDE 200 MCG: 10 INJECTION INTRAVENOUS at 14:53

## 2022-10-10 RX ADMIN — DOXYCYCLINE HYCLATE 100 MG: 100 TABLET, COATED ORAL at 11:54

## 2022-10-10 RX ADMIN — PHENYLEPHRINE HYDROCHLORIDE 200 MCG: 10 INJECTION INTRAVENOUS at 15:09

## 2022-10-10 RX ADMIN — PHENYLEPHRINE HYDROCHLORIDE 100 MCG: 10 INJECTION INTRAVENOUS at 14:51

## 2022-10-10 RX ADMIN — FENTANYL CITRATE 50 MCG: 50 INJECTION INTRAMUSCULAR; INTRAVENOUS at 18:12

## 2022-10-10 RX ADMIN — SENNOSIDES 8.6 MG: 8.6 TABLET, COATED ORAL at 20:14

## 2022-10-10 RX ADMIN — PHENYLEPHRINE HYDROCHLORIDE 100 MCG: 10 INJECTION INTRAVENOUS at 15:06

## 2022-10-10 RX ADMIN — ROCURONIUM BROMIDE 20 MG: 10 INJECTION, SOLUTION INTRAVENOUS at 14:30

## 2022-10-10 RX ADMIN — FENTANYL CITRATE 50 MCG: 50 INJECTION INTRAMUSCULAR; INTRAVENOUS at 15:13

## 2022-10-10 RX ADMIN — DOCUSATE SODIUM 100 MG: 100 CAPSULE, LIQUID FILLED ORAL at 20:14

## 2022-10-10 RX ADMIN — METOPROLOL TARTRATE 25 MG: 25 TABLET, FILM COATED ORAL at 13:11

## 2022-10-10 ASSESSMENT — PAIN SCALES - GENERAL
PAINLEVEL_OUTOF10: 10
PAINLEVEL_OUTOF10: 7
PAINLEVEL_OUTOF10: 10
PAINLEVEL_OUTOF10: 3
PAINLEVEL_OUTOF10: 8

## 2022-10-10 ASSESSMENT — PAIN DESCRIPTION - DESCRIPTORS
DESCRIPTORS: ACHING;SORE
DESCRIPTORS: ACHING
DESCRIPTORS: ACHING
DESCRIPTORS: ACHING;DISCOMFORT

## 2022-10-10 ASSESSMENT — LIFESTYLE VARIABLES: SMOKING_STATUS: 1

## 2022-10-10 ASSESSMENT — PAIN DESCRIPTION - ONSET
ONSET: ON-GOING

## 2022-10-10 ASSESSMENT — PAIN DESCRIPTION - PAIN TYPE
TYPE: SURGICAL PAIN
TYPE: SURGICAL PAIN
TYPE: ACUTE PAIN;SURGICAL PAIN

## 2022-10-10 ASSESSMENT — PAIN DESCRIPTION - ORIENTATION
ORIENTATION: RIGHT;OUTER
ORIENTATION: RIGHT;LOWER

## 2022-10-10 ASSESSMENT — PAIN DESCRIPTION - LOCATION
LOCATION: GENERALIZED
LOCATION: GENERALIZED
LOCATION: RIB CAGE
LOCATION: NECK;RIB CAGE

## 2022-10-10 ASSESSMENT — PAIN - FUNCTIONAL ASSESSMENT
PAIN_FUNCTIONAL_ASSESSMENT: PREVENTS OR INTERFERES SOME ACTIVE ACTIVITIES AND ADLS

## 2022-10-10 ASSESSMENT — PAIN DESCRIPTION - FREQUENCY
FREQUENCY: CONTINUOUS
FREQUENCY: INTERMITTENT
FREQUENCY: CONTINUOUS

## 2022-10-10 NOTE — PROGRESS NOTES
Internal Medicine Resident Progress Note    Patient:  Cadence Echeverria    YOB: 1965  Unit/Bed:[facility] OR () Delaney Crow*  MRN: [id number]    Acct: [de-identified]   PCP: Minh Gardiner MD    Date of Admission: 9/28/2022      Assessment/Plan:  Pleural effusion, right  - Multifactorial; likely secondary to hemodialysis noncompliance  - S/p chest tube 09/28  - S/p chemical pneumolysis 10/04 with tPA and dornase injection  - S/p bronchoscopy 10/06  - Plan for Right Thoracotomy & Decortication with Partial Lung Resection on 10/10  - 90 cc serosanguineous drainage in the last 24 hours  - Pleural studies indicating exudative effusion with cytology negative for malignant cells  -CT surgery and pulmonology following    Acute hypoxic respiratory failure  - Patient maintaining adequate oxygenation on room air; titrate to maintain SPO2 92 to 96%  - S/p chest tube placement 09/28  - Likely secondary to pleural effusion as well as Hx polysubstance abuse as well as poor lung expansion  - Patient encouraged to use incentive spirometer  - Plan for Right Thoracotomy & Decortication with Partial Lung Resection on 10/10  - Continue 0.5 mg Decadron per pulmonology    Trapped lung, right  - S/p chest tube 09/28  - S/p chemical pneumolysis 10/04 with tPA and dornase injection  - S/p bronchoscopy 10/06  - 90 cc serosanguineous drainage in the last 24 hours  - Pleural studies indicating exudative effusion with cytology negative for malignant cells  -Plan for Right Thoracotomy & Decortication with Partial Lung Resection on 10/10    Right lower lobe infiltrate  - Noted on CTA of the chest on 10/02  - Associated with exudative pleural effusion  - S/p chest tube placement 09/28  - S/p chemical pneumolysis 10/04 with tPA dornase injection  - S/p bronchoscopy 10/06  - Day 7/30 doxycycline per pulmonology recommendation  - Incentive spirometry, bronchodilation per pulmonology recommendations  - Repeat imaging to assess for interval changes per pulmonology; last CXR 10/08/2022 with no significant interval changes noted  - Plan for Right Thoracotomy & Decortication with Partial Lung Resection on 10/10     ESRD, on hemodialysis  - Patient on MWF dialysis schedule  - Continue with low phosphorus and low potassium diet  - Nephrology following  - Judicious IV fluids and diuresis per nephrology recommendations  - Strict I's and O's  - S/p albumin infusion 78/48    Systolic heart failure with preserved ejection fraction  - Echo 9/29 increased left ventricular wall thickness with EF 60%  - Strict I's and O's, daily weights  - Judicious IV fluids and diuresis per nephrology  - Continue Imdur  -10/10 dialysis with 2L fluid removal without complication    Hypertrophic cardiomyopathy  - Echo 9/29 showing severe left ventricular concentric hypertrophy as well as dilation of the left atrium  - Continue aspirin, statin, isosorbide mononitrate, verapamil, minoxidil  - Strict I's and O's, daily weights  - Judicious IV fluids and diuresis per nephrology    Primary HTN with current hypotension  - Continue to monitor vital signs  - Continue with clonidine, verapamil, minoxidil, and doxazosin regimen with hold parameters    PTSD  - Continue sertraline, Atarax, trazodone  - Mood stable    Unspecified depressive disorder  - Continue sertraline  - Mood stable    GERD  - Continue PPI    Polysubstance abuse  - Patient counseled on substance abuse cessation    HLD  - Continue atorvastatin    Anemia of chronic disease  - Trend CBC  - Continue iron and folate supplementation    REZA  - Patient tolerating BiPAP at night  - Recommendation for outpatient follow-up for PAP titration with sleep clinic    Thoracic spondylosis  - Noted on CTA of the chest on 10/02  - PT/OT consulted; 10/05 [facility] 15    Hyperkalemia, resolved  - Currently on MWF dialysis schedule  - Continue low potassium diet  - Trend BMP     Hyperphosphatemia, resolved  - Currently on MWF dialysis schedule  - Continue low phosphorus diet  - Patient's home phosphorus binder resumed  - Trend phosphorus and BMP    Pneumothorax, right lower lobe, right, resolved   Apical pneumothorax, right, resolved  Hx Thoracic Aneurysm  Hx AAA s/p aortic stent graft 7/2018  Hx COVID-19 pneumonia  Hx atrial flutter  Hx peripheral vascular disease  Hx hypertensive emergency  - Noted    Expected discharge date: 10/11/2022    Disposition:   [x] Home  [] TCU  [] Rehab  [] Psych  [] SNF  [] [place]  [] Other-    ===================================================================      Chief Complaint: Shortness of breath    Hospital Course: This is a 53-year-old -American male who presented to [address] [place] [address] on 09/28/2022 with worsening shortness of breath at rest and with exertion. CXR showed large right pleural effusion and patient required PAP therapy for adequate oxygenation. Patient was admitted and chest tube placed on 09/28 with 2 L of serosanguineous fluid drainage. Dialysis also performed at 09/28 with 2 L of fluid. Repeat dialysis performed on 09/29 with albumin administration. Repeat dialysis performed on 09/30/2022. Pleural fluid analysis was consistent with exudative effusion with negative cytology. Repeat CTA of the chest showed improvement in the right-sided pleural effusion, but showed evidence of a right apical pneumothorax which resolved on subsequent imaging. Repeat dialysis performed on 10/03 with 2 L removed. Dornase and alteplase administered through chest tube on 10/04 by intensivist.  Hemodialysis performed on 10/05 with 2 L of fluid removed. Bronchoscopy performed on 20/34 without complication. There was evidence of chronic inflammation with pitting airway disease with no mass identified. Dialysis performed on 10/07 with 0.5 L of fluid removed. Dialysis performed on 10/10 with 2L fluid removed. Subjective (past 24 hours):   Patient seen and evaluated at bedside.   He denies chest pain, fever, chills, nausea, vomiting, and diarrhea. He is tolerating his chest tube without any right-sided pain. He states that he is eating well. Denies any other acute symptoms or concerns. ROS: reviewed complete ROS unchanged unless otherwise stated in hospital course/subjective portion. Medications:  Reviewed    Infusion Medications    sodium chloride      sodium chloride       Scheduled Medications    sodium chloride flush  5-40 mL IntraVENous 2 times per day    ceFAZolin (ANCEF) IVPB  2,000 mg IntraVENous On Call to OR    minoxidil  2.5 mg Oral BID    ketamine  100 mg IntraVENous Once    doxycycline hyclate  100 mg Oral 2 times per day    dexamethasone  0.5 mg Oral Daily    docusate sodium  100 mg Oral BID    senna  1 tablet Oral Nightly    [Held by provider] aspirin  81 mg Oral Daily    atorvastatin  40 mg Oral Nightly    cloNIDine  0.3 mg Oral TID    doxazosin  8 mg Oral Nightly    famotidine  20 mg Oral Daily    ferrous sulfate  325 mg Oral Daily with breakfast    folic acid  4,617 mcg Oral Daily    fluticasone  1 spray Nasal Daily    isosorbide mononitrate  120 mg Oral Daily    sertraline  100 mg Oral Daily    verapamil  240 mg Oral Daily    multivitamin  1 tablet Oral Daily    sodium chloride flush  10 mL IntraVENous 2 times per day     PRN Meds: sodium chloride flush, sodium chloride, hydrOXYzine HCl, traZODone, sodium chloride flush, sodium chloride, ondansetron **OR** ondansetron, polyethylene glycol, acetaminophen, HYDROcodone 5 mg - acetaminophen **OR** HYDROcodone 5 mg - acetaminophen, morphine        Intake/Output Summary (Last 24 hours) at 10/10/2022 1413  Last data filed at 10/10/2022 1100  Gross per 24 hour   Intake 400 ml   Output 2450 ml   Net -2050 ml       Exam:  BP (!) 162/73   Pulse 86   Temp 97.6 °F (36.4 °C) (Oral)   Resp 20   Ht 6' 3\" (1.905 m)   Wt 208 lb 8.9 oz (94.6 kg)   SpO2 93%   BMI 26.07 kg/m²     General: No distress, appears stated age. Eyes:  PERRL. Conjunctivae/corneas clear. HENT: Head normal appearing. Nares normal. Oral mucosa moist.  Hearing intact. Neck: Supple, with full range of motion. Trachea midline. No gross JVD appreciated. Respiratory: Normal respiratory effort. Diffuse crackles heard in the right lobe. No wheezes or rhonchi. Chest tube in place draining serosanguineous fluid. Nasal cannula in place. Cardiovascular: Normal rate, regular rhythm with normal S1/S2 without murmurs. No lower extremity edema. Abdomen: Soft, non-tender, non-distended with normal bowel sounds. Musculoskeletal: No joint swelling or tenderness. Normal tone. No abnormal movements. Protuberance associated with dialysis port in left arm  Skin: Warm and dry. No rashes or lesions. Neurologic:  No focal sensory/motor deficits in the upper or lower extremities. Cranial nerves:  grossly non-focal 2-12. Psychiatric: Alert and oriented, normal insight and thought content. Capillary Refill: Brisk,< 3 seconds. Peripheral Pulses: +2 palpable, equal bilaterally. Labs:   Recent Labs     10/08/22  0347   WBC 4.8   HGB 10.4*   HCT 33.4*   *     Recent Labs     10/08/22  0347 10/09/22  0425 10/10/22  0335    137 135   K 4.5 4.8 5.2    100 99   CO2 25 25 24   BUN 34* 60* 77*   CREATININE 6.0* 9.0* 10.6*   CALCIUM 8.8 9.1 9.3     No results for input(s): AST, ALT, BILIDIR, BILITOT, ALKPHOS in the last 72 hours. No results for input(s): INR in the last 72 hours. No results for input(s): Noemí Ileana in the last 72 hours. No results for input(s): PROCAL in the last 72 hours.    Lab Results   Component Value Date/Time    NITRU NEGATIVE 04/03/2018 07:45 PM    WBCUA 2-4 04/03/2018 07:45 PM    BACTERIA NONE 04/03/2018 07:45 PM    RBCUA 5-10 04/03/2018 07:45 PM    BLOODU SMALL 04/03/2018 07:45 PM    SPECGRAV 1.014 03/07/2018 09:10 PM    GLUCOSEU NEGATIVE 04/03/2018 07:45 PM       Radiology (48 hours):  XR CHEST PORTABLE    Result Date: 10/5/2022  Small pneumothorax at the right lung base. Chest tube tip is at the right lung base, grossly unchanged. Small right pleural effusion, decreased. Right lung base consolidation, increased, suspicious for atelectasis or pneumonia. Cardiomegaly.  This document has been electronically signed by: Colton Martinez MD on 10/05/2022 03:41 AM        DVT prophylaxis:    [] Lovenox  [] SCDs  [x] SQ Heparin  [] Encourage ambulation   [] Already on Anticoagulation       Diet: Diet NPO Exceptions are: Sips of Water with Meds  Code Status: Full Code  PT/OT: Consulted      Electronically signed by Verena Nissen, MD on 10/10/2022 at 2:13 PM    Case was discussed with Attending, Dr. Jer Gamez

## 2022-10-10 NOTE — CARE COORDINATION
Collaborative Discharge Planning    Amanda Mancia  :  1965  MRN:  [id number]    ADMIT DATE:  2022      Discharge Planning Discharge Planning  Type of Residence: House  Living Arrangements: Spouse/Significant Other  Support Systems: Spouse/Significant Other  Current Services Prior To Admission: None  Potential Assistance Needed: N/A  DME Ordered?: No  Potential Assistance Purchasing Medications: Yes  Type of Home Care Services: None  Patient expects to be discharged to[de-identified] House  Follow Up Appointment: Best Day/Time : Monday AM  One/Two Story Residence: [facility]  # of Interior Steps: 12  Height of Each Step (in): 6.5 INCHES  Interior Rails: Right  Lift Chair Available: No  History of falls?: No  White Board Notes /Social Work Whiteboard Notes  /Social Work Whiteboard: 10/10; SW: plans home w spouse Selene Louis, has HD T-R-S w AVF;  [facility] for RN services    Discharge Plan Home with home health  [facility] w [address]  [facility] [address] M-T-F 4481 chair time; B/W Cab transports to HD; has AVF; therapy following    Discharge Milestones and Delays: Clinical status    ESRD/Loculated Right Pleural Effusion/Pneumothorax/Right Entrapped Lung      Right Chest Tube  10/4 TPA #1  10/6 BAL: Citrobacter Koseri  10/10 Right Thoracotomy w Decortication, Partial Lung Resection      Right Chest Tube Output = 90 ml/24h     Creatinine 10.6, monitor    IV AB    SIGNED:  Michael Fischer RN   10/10/2022, 1:35 PM

## 2022-10-10 NOTE — PROGRESS NOTES
[address] THERAPY MISSED TREATMENT NOTE  [facility] ICU STEPDOWN TELEMETRY 4K  [id number]      Date: 10/10/2022  Patient Name: Hyun Gonzalez        CSN: [id number]   : 1965  (56 y.o.)  Gender: male   Referring Practitioner: Dr. Alisa Tim MD  Diagnosis: Hypoxia         REASON FOR MISSED TREATMENT: Patient Off Floor for Dialysis. Will re attempt as time allows.

## 2022-10-10 NOTE — PROGRESS NOTES
[facility]  PHYSICAL THERAPY MISSED TREATMENT NOTE  ACUTE CARE  [facility] ICU STEPDOWN TELEMETRY 4K              Missed Treatment  Pt. Off unit for dialysis at time of attempt. Will check back as time allows.

## 2022-10-10 NOTE — ANESTHESIA PROCEDURE NOTES
Arterial Line:    An arterial line was placed using ultrasound guidance, in the OR for the following indication(s): continuous blood pressure monitoring and blood sampling needed. A 20 gauge (size), 1 and 3/8 inch (length), Angiocath (type) catheter was placed, Seldinger technique used, into the right radial artery, secured by tape and Tegaderm. Anesthesia type: General    Events:  patient tolerated procedure well with no complications and EBL < 5mL. 10/10/2022 2:12 PM10/10/2022 2:17 PM  Anesthesiologist: April Vernon DO  Performed: Anesthesiologist   Preanesthetic Checklist  Completed: patient identified, IV checked, site marked, risks and benefits discussed, surgical/procedural consents, equipment checked, pre-op evaluation, timeout performed, anesthesia consent given, oxygen available, monitors applied/VS acknowledged, fire risk safety assessment completed and verbalized and blood product R/B/A discussed and consented

## 2022-10-10 NOTE — PLAN OF CARE
Problem: Discharge Planning  Goal: Discharge to home or other facility with appropriate resources  10/10/2022 0040 by Israel Sorenson RN  Outcome: Progressing  Flowsheets (Taken 10/10/2022 0040)  Discharge to home or other facility with appropriate resources:   Identify barriers to discharge with patient and caregiver   Identify discharge learning needs (meds, wound care, etc)   Arrange for needed discharge resources and transportation as appropriate   Refer to discharge planning if patient needs post-hospital services based on physician order or complex needs related to functional status, cognitive ability or social support system     Problem: Pain  Goal: Verbalizes/displays adequate comfort level or baseline comfort level  10/10/2022 0040 by Israel Sorenson RN  Outcome: Progressing  Flowsheets (Taken 10/10/2022 0040)  Verbalizes/displays adequate comfort level or baseline comfort level:   Implement non-pharmacological measures as appropriate and evaluate response   Assess pain using appropriate pain scale   Encourage patient to monitor pain and request assistance     Problem: Safety - Adult  Goal: Free from fall injury  10/10/2022 0040 by Israel Sorenson RN  Outcome: Progressing  Flowsheets (Taken 10/10/2022 0040)  Free From Fall Injury: Instruct family/caregiver on patient safety     Problem: ABCDS Injury Assessment  Goal: Absence of physical injury  10/10/2022 0040 by Israel Sorenson RN  Outcome: Progressing  Flowsheets (Taken 10/10/2022 0040)  Absence of Physical Injury: Implement safety measures based on patient assessment     Problem: Respiratory - Adult  Goal: Achieves optimal ventilation and oxygenation  10/10/2022 0040 by Israel Sorenson RN  Outcome: Progressing  Flowsheets (Taken 10/10/2022 0040)  Achieves optimal ventilation and oxygenation:   Assess for changes in respiratory status   Position to facilitate oxygenation and minimize respiratory effort     Problem: Cardiovascular - Adult  Goal: Maintains optimal cardiac output and hemodynamic stability  10/10/2022 0040 by Ramona Matta RN  Outcome: Progressing  Flowsheets (Taken 10/10/2022 0040)  Maintains optimal cardiac output and hemodynamic stability: Monitor blood pressure and heart rate     Problem: Cardiovascular - Adult  Goal: Absence of cardiac dysrhythmias or at baseline  10/10/2022 0040 by Ramona Matta RN  Outcome: Progressing  Flowsheets (Taken 10/10/2022 0040)  Absence of cardiac dysrhythmias or at baseline:   Monitor cardiac rate and rhythm   Assess for signs of decreased cardiac output     Problem: Skin/Tissue Integrity - Adult  Goal: Skin integrity remains intact  10/10/2022 0040 by Ramona Matta RN  Outcome: Progressing  Flowsheets (Taken 10/10/2022 0040)  Skin Integrity Remains Intact: Monitor for areas of redness and/or skin breakdown     Problem: Skin/Tissue Integrity - Adult  Goal: Incisions, wounds, or drain sites healing without S/S of infection  10/10/2022 0040 by Ramona Matta RN  Outcome: Progressing  Flowsheets (Taken 10/10/2022 0040)  Incisions, Wounds, or Drain Sites Healing Without Sign and Symptoms of Infection:   TWICE DAILY: Assess and document skin integrity   TWICE DAILY: Assess and document dressing/incision, wound bed, drain sites and surrounding tissue     Problem: Skin/Tissue Integrity - Adult  Goal: Oral mucous membranes remain intact  10/10/2022 0040 by Ramona Matta RN  Outcome: Progressing  Flowsheets (Taken 10/10/2022 0040)  Oral Mucous Membranes Remain Intact: Assess oral mucosa and hygiene practices     Problem: Infection - Adult  Goal: Absence of infection at discharge  Outcome: Progressing  Flowsheets (Taken 10/10/2022 0040)  Absence of infection at discharge:   Assess and monitor for signs and symptoms of infection   Monitor lab/diagnostic results   Monitor all insertion sites i.e., indwelling lines, tubes and drains   Administer medications as ordered     Problem: Infection - Adult  Goal: Absence of infection during hospitalization  10/10/2022 0040 by Laurie Turcios RN  Outcome: Progressing  Flowsheets (Taken 10/10/2022 0040)  Absence of infection during hospitalization:   Assess and monitor for signs and symptoms of infection   Monitor lab/diagnostic results   Monitor all insertion sites i.e., indwelling lines, tubes and drains     Problem: Infection - Adult  Goal: Absence of fever/infection during anticipated neutropenic period  Outcome: Progressing  Flowsheets (Taken 10/10/2022 0040)  Absence of fever/infection during anticipated neutropenic period: Monitor white blood cell count     Problem: Metabolic/Fluid and Electrolytes - Adult  Goal: Electrolytes maintained within normal limits  10/10/2022 0040 by Laurie Turcios RN  Outcome: Progressing  Flowsheets (Taken 10/10/2022 0040)  Electrolytes maintained within normal limits:   Monitor labs and assess patient for signs and symptoms of electrolyte imbalances   Monitor response to electrolyte replacements, including repeat lab results as appropriate     Problem: Metabolic/Fluid and Electrolytes - Adult  Goal: Hemodynamic stability and optimal renal function maintained  10/10/2022 0040 by Laurie Turcios RN  Outcome: Progressing  Flowsheets (Taken 10/10/2022 0040)  Hemodynamic stability and optimal renal function maintained:   Monitor labs and assess for signs and symptoms of volume excess or deficit   Monitor intake, output and patient weight     Problem: Hematologic - Adult  Goal: Maintains hematologic stability  10/10/2022 0040 by Laurie Turcios RN  Outcome: Progressing  Flowsheets (Taken 10/10/2022 0040)  Maintains hematologic stability: Assess for signs and symptoms of bleeding or hemorrhage     Problem: Nutrition Deficit:  Goal: Optimize nutritional status  10/10/2022 0040 by Laurie Turcios RN  Outcome: Progressing  Flowsheets (Taken 10/10/2022 0040)  Nutrient intake appropriate for improving, restoring, or maintaining nutritional needs:   Assess nutritional status and recommend course of action   Monitor oral intake, labs, and treatment plans     Problem: Neurosensory - Adult  Goal: Achieves maximal functionality and self care  10/10/2022 0040 by Simran Shea RN  Outcome: Progressing  Flowsheets (Taken 10/10/2022 0040)  Achieves maximal functionality and self care: Monitor swallowing and airway patency with patient fatigue and changes in neurological status     Problem: Neurosensory - Adult  Goal: Achieves stable or improved neurological status  10/10/2022 0040 by Simran Shea RN  Outcome: Progressing  Flowsheets (Taken 10/10/2022 0040)  Achieves stable or improved neurological status: Assess for and report changes in neurological status     Problem: Musculoskeletal - Adult  Goal: Return mobility to safest level of function  10/10/2022 0040 by Simran Shea RN  Outcome: Progressing  Flowsheets (Taken 10/10/2022 0040)  Return Mobility to Safest Level of Function: Assess patient stability and activity tolerance for standing, transferring and ambulating with or without assistive devices    Problem: Genitourinary - Adult  Goal: Absence of urinary retention  10/10/2022 0040 by Simran Shea RN  Outcome: Progressing  Flowsheets (Taken 10/10/2022 0040)  Absence of urinary retention: Assess patients ability to void and empty bladder        Problem: Musculoskeletal - Adult  Goal: Return ADL status to a safe level of function  10/10/2022 0040 by Simran Shea RN  Outcome: Progressing

## 2022-10-10 NOTE — PROGRESS NOTES
[facility]   Nephrology progress note  10/10/2022, 7:44 AM      Pt Name:    Sofi Eldridge  MRN:     [id number]     YOB: 1965  Admit Date:    9/28/2022  9:56 AM    Chief Complaint: Nephrology following for ESRD. Subjective:  Patient was seen and examined on HD. /82, UF 2 liters. Feels ok, on room air. Going to OR this afternoon. Objective:  24HR INTAKE/OUTPUT:    Intake/Output Summary (Last 24 hours) at 10/10/2022 0744  Last data filed at 10/9/2022 2026  Gross per 24 hour   Intake 1270 ml   Output 90 ml   Net 1180 ml         I/O last 3 completed shifts: In: 26 [P.O.:1260; I.V.:10]  Out: 90 [Chest Tube:90]  No intake/output data recorded.    Admission weight: 250 lb (113.4 kg)  Wt Readings from Last 3 Encounters:   10/10/22 213 lb 3 oz (96.7 kg)   09/13/21 250 lb (113.4 kg)   07/26/21 240 lb (108.9 kg)        Vitals :   Vitals:    10/09/22 2312 10/10/22 0343 10/10/22 0614 10/10/22 0635   BP: (!) 118/57 (!) 142/69 (!) 158/71 (!) 159/102   Pulse: 75 84 90 88   Resp: 18 18 18 18   Temp: 98.1 °F (36.7 °C) 98 °F (36.7 °C) 97.7 °F (36.5 °C) 97.6 °F (36.4 °C)   TempSrc: Oral Oral Oral    SpO2: 90% 95% 95% 92%   Weight:  227 lb 11.8 oz (103.3 kg)  213 lb 3 oz (96.7 kg)   Height:           Physical examination  General Appearance: alert and cooperative with exam, appears comfortable, no distress  Mouth/Throat: Oral mucosa moist  Neck: No JVD  Lungs: diminished, right sided chest tube  Heart:  S1, S2 heard  GI: soft, non-tender  Extremities: improved LE edema, left arm AV fistula    Medications:  Infusion:    sodium chloride      sodium chloride       Meds:    sodium chloride flush  5-40 mL IntraVENous 2 times per day    ceFAZolin (ANCEF) IVPB  2,000 mg IntraVENous On Call to OR    minoxidil  2.5 mg Oral BID    ketamine  100 mg IntraVENous Once    doxycycline hyclate  100 mg Oral 2 times per day    dexamethasone  0.5 mg Oral Daily    docusate sodium  100 mg Oral BID    senna  1 tablet Oral Nightly    [Held by provider] aspirin  81 mg Oral Daily    atorvastatin  40 mg Oral Nightly    cloNIDine  0.3 mg Oral TID    doxazosin  8 mg Oral Nightly    famotidine  20 mg Oral Daily    ferrous sulfate  325 mg Oral Daily with breakfast    folic acid  8,064 mcg Oral Daily    fluticasone  1 spray Nasal Daily    isosorbide mononitrate  120 mg Oral Daily    sertraline  100 mg Oral Daily    verapamil  240 mg Oral Daily    multivitamin  1 tablet Oral Daily    sodium chloride flush  10 mL IntraVENous 2 times per day     Meds prn: sodium chloride flush, sodium chloride, hydrOXYzine HCl, traZODone, sodium chloride flush, sodium chloride, ondansetron **OR** ondansetron, polyethylene glycol, acetaminophen, HYDROcodone 5 mg - acetaminophen **OR** HYDROcodone 5 mg - acetaminophen, morphine     Lab Data :  CBC:   Recent Labs     10/08/22  0347   WBC 4.8   HGB 10.4*   HCT 33.4*   *     CMP:  Recent Labs     10/08/22  0347 10/09/22  0425 10/10/22  0335    137 135   K 4.5 4.8 5.2    100 99   CO2 25 25 24   BUN 34* 60* 77*   CREATININE 6.0* 9.0* 10.6*   GLUCOSE 87 87 93   CALCIUM 8.8 9.1 9.3     Hepatic:   No results for input(s): LABALBU, AST, ALT, ALB, BILITOT, ALKPHOS in the last 72 hours. Assessment and Plan:    1. ESRD on HD   HD today, UF 2 liters  Patient scheduled for surgery after dialysis      2. Hyperkalemia : better  3. HTN: improved  4. Loculated pleural effusion:thoracotomy today  5. Anemia in CKD  6. Secondary hyperparathyroidism  7. Chronic fluid overload    D/W patient     Yvette Herrera DO  [facility]    This report has been created using voice recognition software.  It may contain minor errors which are inherent in voice recognition technology

## 2022-10-10 NOTE — PROGRESS NOTES
1822 pt arrived to PACU, awakens to voice. C/o pain, medicated with fentanyl by CRNA. Dressing CDI. 3117 Dr Quinton Austin at bedside. VSS  1845 pt awakens to voice. Denies pain and states just uncomfortable in bed. Repositioned, pt satisfied  1900 pt removed IV in R hand. Dressing applied. [facility] Meets criteria for discharge from PACU, placed transport to [id number]  1908 transported to [id number] in stable condition.  Dressing CDI

## 2022-10-10 NOTE — PLAN OF CARE
Problem: Discharge Planning  Goal: Discharge to home or other facility with appropriate resources  Outcome: Progressing  Flowsheets (Taken 10/10/2022 1550)  Discharge to home or other facility with appropriate resources:   Identify barriers to discharge with patient and caregiver   Arrange for needed discharge resources and transportation as appropriate     Problem: Pain  Goal: Verbalizes/displays adequate comfort level or baseline comfort level  Outcome: Progressing  Flowsheets (Taken 10/10/2022 1550)  Verbalizes/displays adequate comfort level or baseline comfort level:   Assess pain using appropriate pain scale   Encourage patient to monitor pain and request assistance  Note: Pain Assessment: None - Denies Pain  Pain Level: 3   Patient's Stated Pain Goal: 0 - No pain   Is pain goal met at this time?   No     Non-Pharmaceutical Pain Intervention(s): Rest, Repositioned      Problem: Safety - Adult  Goal: Free from fall injury  Outcome: Progressing  Flowsheets (Taken 10/10/2022 1550)  Free From Fall Injury: Instruct family/caregiver on patient safety     Problem: ABCDS Injury Assessment  Goal: Absence of physical injury  Outcome: Progressing  Flowsheets (Taken 10/10/2022 1550)  Absence of Physical Injury: Implement safety measures based on patient assessment     Problem: Respiratory - Adult  Goal: Achieves optimal ventilation and oxygenation  Outcome: Progressing  Flowsheets (Taken 10/10/2022 1550)  Achieves optimal ventilation and oxygenation:   Assess for changes in respiratory status   Assess for changes in mentation and behavior     Problem: Cardiovascular - Adult  Goal: Maintains optimal cardiac output and hemodynamic stability  Outcome: Progressing  Flowsheets (Taken 10/10/2022 1550)  Maintains optimal cardiac output and hemodynamic stability: Monitor blood pressure and heart rate  Goal: Absence of cardiac dysrhythmias or at baseline  Outcome: Progressing  Flowsheets (Taken 10/10/2022 1550)  Absence of cardiac dysrhythmias or at baseline: Monitor cardiac rate and rhythm     Problem: Skin/Tissue Integrity - Adult  Goal: Skin integrity remains intact  Outcome: Progressing  Goal: Incisions, wounds, or drain sites healing without S/S of infection  Outcome: Progressing  Goal: Oral mucous membranes remain intact  Outcome: Progressing   Care plan reviewed with patient. Patient verbalize understanding of the plan of care and contribute to goal setting.

## 2022-10-10 NOTE — FLOWSHEET NOTE
10/10/22 0635 10/10/22 1100   Vital Signs   BP (!) 159/102 (!) 159/83   Temp 97.6 °F (36.4 °C) 98.4 °F (36.9 °C)   Heart Rate 88 94   Resp 18 20   SpO2 92 % 96 %   Weight 213 lb 3 oz (96.7 kg) 208 lb 8.9 oz (94.6 kg)   Weight Method Bed scale Bed scale   Percent Weight Change -6.39 -2.17   Post-Hemodialysis Assessment   Post-Treatment Procedures  --  Blood returned; Access bleeding time < 10 minutes   Machine Disinfection Process  --  Acid/Vinegar Clean;Heat Disinfect; Exterior Machine Disinfection   Blood Volume Processed (Liters)  --  102.8 l/min   Dialyzer Clearance  --  Lightly streaked   Duration of Treatment (minutes)  --  240 minutes   Heparin Amount Administered During Treatment (mL)  --  0 mL   Hemodialysis Intake (ml)  --  400 ml   Hemodialysis Output (ml)  --  2400 ml   NET Removed (ml)  --  2000   Tolerated Treatment  --  Good   Stable 4 hour treatment complete. Removed 2 liters of fluid as per order. Tolerated fluid removal well. Pressure held to needle sites times ten minutes each. Dressing clean, dry and intact. Report given to primary RN. Treatment record printed for scanning into EMR.

## 2022-10-10 NOTE — ANESTHESIA PRE PROCEDURE
Department of Anesthesiology  Preprocedure Note       Name:  Shanita Cifuentes   Age:  56 y.o.  :  1965                                          MRN:  [id number]         Date:  10/10/2022      Surgeon: Cassandra Ott):  Preeti Marshall MD    Procedure: Procedure(s):  Right Thoracotomy & Decortication with Partial Lung Resection    Medications prior to admission:   Prior to Admission medications    Medication Sig Start Date End Date Taking? Authorizing Provider   minoxidil (LONITEN) 2.5 MG tablet Take 15 mg by mouth 2 times daily   Yes Historical Provider, MD   atorvastatin (LIPITOR) 80 MG tablet TAKE 1/2 TABLET BY MOUTH ONCE DAILY IN THE EVENING  Patient not taking: Reported on 2022   Historical Provider, MD   sevelamer (RENVELA) 800 MG tablet Take 1,600 mg by mouth    Historical Provider, MD   Sucroferric Oxyhydroxide (VELPHORO) 500 MG CHEW Take 500 mg by mouth 4 times daily (before meals and nightly) Take with meals and snacks    Historical Provider, MD   Multiple Vitamins-Minerals (THERAPEUTIC MULTIVITAMIN-MINERALS) tablet Take 1 tablet by mouth daily    Historical Provider, MD   folic acid (FOLVITE) 084 MCG tablet Take 800 mcg by mouth daily    Historical Provider, MD   vitamin E 400 UNIT capsule Take 400 Units by mouth daily    Historical Provider, MD   ferrous sulfate (FE TABS 325) 325 (65 Fe) MG EC tablet Take 1 tablet by mouth 3 times daily (with meals)  Patient taking differently: Take 325 mg by mouth daily (with breakfast) 3/13/20   Leigh Ann Ventura MD   nitroGLYCERIN (NITROSTAT) 0.4 MG SL tablet up to max of 3 total doses.  If no relief after 1 dose, call 911. 20   Leigh Ann Ventura MD   acetaminophen (TYLENOL) 325 MG tablet Take 650 mg by mouth every 4 hours as needed for Pain or Fever    Historical Provider, MD   B Complex-C-Zn-Folic Acid (DIALYVITE 209-RNQG 15 PO) Take 1 tablet by mouth daily    Historical Provider, MD   verapamil (CALAN SR) 240 MG extended release tablet Take 240 mg by mouth daily    Historical Provider, MD   vitamin D (CHOLECALCIFEROL) 25 MCG (1000 UT) TABS tablet Take 1,000 Units by mouth 2 times daily Take two tablets twice a day    Historical Provider, MD   cloNIDine (CATAPRES) 0.3 MG tablet Take 1 tablet by mouth 3 times daily 1/28/20 9/28/22  WADE Severino CNP   doxazosin (CARDURA) 8 MG tablet Take 1 tablet by mouth every 12 hours for 28 days  Patient taking differently: Take 8 mg by mouth nightly  1/28/20 7/27/20  WADE Severino CNP   famotidine (PEPCID) 20 MG tablet Take 1 tablet by mouth daily 1/28/20   WADE Severino CNP   sertraline (ZOLOFT) 100 MG tablet Take 100 mg by mouth daily    Historical Provider, MD   hydrOXYzine (ATARAX) 50 MG tablet Take 50 mg by mouth 3 times daily as needed for Itching    Historical Provider, MD   aspirin 81 MG EC tablet Take 1 tablet by mouth daily 3/23/18   Claudio Mejia MD   isosorbide mononitrate (IMDUR) 120 MG extended release tablet Take 1 tablet by mouth daily 3/24/18   Claudio Mejia MD   traZODone (DESYREL) 50 MG tablet Take 1 tablet by mouth nightly as needed for Sleep 3/23/18   Claudio Mejia MD   atorvastatin (LIPITOR) 40 MG tablet Take 1 tablet by mouth nightly 3/23/18   Claudio Mejia MD   fluticasone (FLONASE) 50 MCG/ACT nasal spray 1 spray by Nasal route daily     Historical Provider, MD       Current medications:    Current Facility-Administered Medications   Medication Dose Route Frequency Provider Last Rate Last Admin    sodium chloride flush 0.9 % injection 5-40 mL  5-40 mL IntraVENous 2 times per day Nba Sample, PA-C        sodium chloride flush 0.9 % injection 5-40 mL  5-40 mL IntraVENous PRN Nba Sample, PA-C        0.9 % sodium chloride infusion   IntraVENous PRN Nba Sample, PA-C        ceFAZolin (ANCEF) 2000 mg in dextrose 5 % 50 mL IVPB  2,000 mg [facility]VENous [facility]        minoxidil (LONITEN) tablet 2.5 mg  2.5 mg Oral BID Hermila Clayton DO 2.5 mg at 10/10/22 1154    ketamine (KETALAR) injection 100 mg  100 mg IntraVENous Once WADE Richter CNP        doxycycline hyclate (VIBRA-TABS) tablet 100 mg  100 mg Oral 2 times per day WADE Richter CNP   100 mg at 10/10/22 1154    dexamethasone (DECADRON) tablet 0.5 mg  0.5 mg Oral Daily Ivy Mcdonald MD   0.5 mg at 10/10/22 1155    docusate sodium (COLACE) capsule 100 mg  100 mg Oral BID Ashlee Liz MD   100 mg at 10/09/22 2034    senna (SENOKOT) tablet 8.6 mg  1 tablet Oral Nightly Ashlee Liz MD   8.6 mg at 10/09/22 2034    [Held by provider] aspirin EC tablet 81 mg  81 mg Oral Daily Chacha Garrett PA-C   81 mg at 10/09/22 0819    atorvastatin (LIPITOR) tablet 40 mg  40 mg Oral Nightly Chacha Garrett PA-C   40 mg at 10/09/22 2034    cloNIDine (CATAPRES) tablet 0.3 mg  0.3 mg Oral TID Demarco Howells, DO   0.3 mg at 10/10/22 1154    doxazosin (CARDURA) tablet 8 mg  8 mg Oral Nightly Demarco Howells, DO   8 mg at 10/09/22 2034    famotidine (PEPCID) tablet 20 mg  20 mg Oral Daily Linette Tran PA-C   20 mg at 10/09/22 0820    ferrous sulfate (IRON 325) tablet 325 mg  325 mg Oral Daily with breakfast Linette Tran PA-C   325 mg at 47/03/02 8617    folic acid (FOLVITE) tablet 1,000 mcg  1,000 mcg Oral Daily Linette Tran PA-C   1,000 mcg at 10/09/22 3776    hydrOXYzine HCl (ATARAX) tablet 50 mg  50 mg Oral TID PRN Linette Tran PA-C   50 mg at 09/28/22 2136    fluticasone (FLONASE) 50 MCG/ACT nasal spray 1 spray  1 spray Nasal Daily Linette Tran PA-C   1 spray at 10/09/22 0820    isosorbide mononitrate (IMDUR) extended release tablet 120 mg  120 mg Oral Daily Linette Tran PA-C   120 mg at 10/09/22 7917    sertraline (ZOLOFT) tablet 100 mg  100 mg Oral Daily Linette Tran PA-C   100 mg at 10/10/22 1154    traZODone (DESYREL) tablet 50 mg  50 mg Oral Nightly PRN Linette Tran PA-C   50 mg at 09/28/22 1516    verapamil (CALAN SR) extended release tablet 240 mg  240 mg Oral Daily Collin Flores DO   240 mg at 10/10/22 1154    multivitamin 1 tablet  1 tablet Oral Daily Linette Tran PA-C   1 tablet at 10/09/22 7511    sodium chloride flush 0.9 % injection 10 mL  10 mL IntraVENous 2 times per day Janusz Treviño PA-C   10 mL at 10/10/22 1200    sodium chloride flush 0.9 % injection 10 mL  10 mL IntraVENous PRN Linette Tran PA-C        0.9 % sodium chloride infusion   IntraVENous PRN Linette Tran PA-C        ondansetron (ZOFRAN-ODT) disintegrating tablet 4 mg  4 mg Oral Q8H PRN Linette Tran PA-C        Or    ondansetron (ZOFRAN) injection 4 mg  4 mg IntraVENous Q6H PRN Linette Tran PA-C        polyethylene glycol (GLYCOLAX) packet 17 g  17 g Oral Daily PRN Linette Tran PA-C   17 g at 10/05/22 0617    acetaminophen (TYLENOL) tablet 650 mg  650 mg Oral Q6H PRN Harmony Bautista MD        HYDROcodone-acetaminophen [facility]) 5-325 MG per tablet 1 tablet  1 tablet Oral Q6H PRN Harmony Bautista MD   1 tablet at 10/07/22 1238    Or    HYDROcodone-acetaminophen (NORCO) 5-325 MG per tablet 2 tablet  2 tablet Oral Q6H PRN Harmony Bautista MD   2 tablet at 10/07/22 2150    morphine (PF) injection 2 mg  2 mg IntraVENous Q4H PRN Harmony Bautista MD   2 mg at 09/30/22 2044     Facility-Administered Medications Ordered in Other Encounters   Medication Dose Route Frequency Provider Last Rate Last Admin    HYDROmorphone (DILAUDID) injection 1 mg  1 mg IntraVENous Once Abigail Kwon MD        lidocaine (LMX) 4 % cream   Topical Once Abigail Kwon MD        midazolam (VERSED) injection 1 mg  1 mg IntraVENous Once Abigail Kwon MD           Allergies: Allergies   Allergen Reactions    Humalog [Insulin Lispro] Other (See Comments)     Extreme sweating and intolerance. Patient absolutely refuses due to reaction.      Insulin Regular Human Hives    Lisinopril Swelling     Swelling in lips        Problem List:    Patient Active Problem List   Diagnosis Code    FSGS (focal segmental glomerulosclerosis) N05.1    Diabetes mellitus type 2, controlled ([facility] E11.9    Monoclonal (M) protein disease, multiple 'M' protein D47.2    Depression F32. A    PTSD (post-traumatic stress disorder) F43.10    Secondary renal hyperparathyroidism ([facility] N25.81    Cocaine use F14.90    Substance induced mood disorder ([facility]) F19.94    Renal artery stenosis ([facility]) with uncontrollable hypertension I70.1    Chronic alcoholism ([facility] F10.20    Severe cocaine use disorder ([facility]) F14.20    Uncontrolled hypertension I10    LVH (left ventricular hypertrophy) due to hypertensive disease I11.9    Acute on chronic diastolic CHF (congestive heart failure) ([facility]) I50.33    REZA (obstructive sleep apnea) G47.33    Headache, unspecified headache type R51.9    FH: HTN (hypertension) Z82.49    Hypertrophic cardiomyopathy ([facility]) I42.2    Diet-controlled diabetes mellitus ([facility]) E11.9    A-V fistula ([facility]) I77.0    Chronic thoracic aortic dissection I71.019    Hyperphosphatemia E83.39    Anemia in CKD (chronic kidney disease) N18.9, D63.1    Vitamin D deficiency E55.9    Thrombocytopenia ([facility]) D69.6    Personality disorder ([facility]) F60.9    Tobacco abuse E26.9    Metabolic acidosis C20.69    Pneumonia due to organism J18.9    Precordial pain R07.2    ESRD on hemodialysis ([facility]) N18.6, Z99.2    Edema of upper extremity R60.0    ESRD (end stage renal disease) ([facility]) N18.6    Hyperglycemia R73.9    Cocaine abuse, continuous - reports spending $100 on cocaine \"every other day\" F14.10    Homelessness Z59.00    Moderate episode of recurrent major depressive disorder ([facility] F33.1    Noncompliance of patient with renal dialysis ([facility] Z91.15    Anemia associated with stage 5 chronic renal failure ([facility]) N18.5, D63.1    Hypertensive emergency I16.1    Cocaine use disorder, severe, dependence ([facility]) F14.20    Alcohol use disorder, severe, dependence ([facility] F10.20    Abdominal aortic aneurysm (AAA) without rupture I71.40    Atrial flutter with rapid ventricular response ([facility]) I48.92    PVD (peripheral vascular disease) ([facility]) I73.9    Dyslipidemia E78.5    Other fluid overload E87.79    Laceration of flexor muscle, fascia and tendon of left thumb at forearm level, initial encounter S56.022A    Acute respiratory failure with hypoxia and hypercarbia (HCC) J96.01, J96.02    Acute pulmonary edema ([facility]) J81.0    Macrocytic anemia D53.9    History of aortic aneurysm repair Z98.890, Z86.79    Medical non-compliance Z91.199    Volume overload E87.70    Chest pain R07.9    Dyspnea R06.00    Pancytopenia ([facility]) D61.818    Acute febrile illness R50.9    Hyponatremia E87.1    Hypermagnesemia E83.41    History of atrial flutter Z86.79    History of alcohol abuse F10.11    History of cocaine abuse ([facility]) F14.11    Mild tricuspid regurgitation I07.1    Uremia, acute N19    COVID-19 virus detected U07.1    COVID-19 U07.1    Hypoxia R09.02       Past Medical History:        Diagnosis Date    AAA (abdominal aortic aneurysm)     NURIS (acute kidney injury) ([facility] 9/24/2015    Anemia associated with chronic renal failure     Arthritis     stated in hands    Cocaine abuse ([facility] 5/10/2014    Depression     Diabetes mellitus ([facility]     pt states he no longer has diabetes he has lost alot of davion.      FSGS (focal segmental glomerulosclerosis) 5/23/2013    Hemodialysis patient ([facility] 10/17/2016    on hemod[facility]    Hemorrhoids 1/16/2012    History of blood transfusion     Hyperlipidemia     Hyperphosphatemia 5/21/2016    Hypertension     Left renal artery stenosis ([facility] 5/22/2014    Monoclonal (M) protein disease, multiple 'M' protein     Nicotine dependence 6/16/2014    Noncompliance     Pneumonia     Psychiatric problem     PTSD (post-traumatic stress disorder)     [place] [facility] [place]    Secondary hyperparathyroidism (of renal origin)     Sleep apnea        Past Surgical History:        Procedure Laterality Date    ABDOMINAL AORTIC ANEURYSM REPAIR      BRONCHOSCOPY N/A 10/6/2022    BRON[facility]OS[facility] performed [facility] Jennyfer Doll [facility] Left 07/08/2016    EKG 12-LEAD  9/24/2015         HAND TENDON SURGERY Left 4/5/2019    LEFT THUMB FLEXOR TENDON REPAIR performed by Doug Jessica MD at [address] [place] [address] VASCULAR SURGERY Left 07/08/2016    AV Fistula    VASCULAR SURGERY Right 1990    Surgery on Achilles tendon       Social History:    Social History     Tobacco Use    Smoking status: Some Days     Packs/day: 0.25     Years: 20.00     Pack years: 5.00     Types: Cigarettes     Start date: 10/11/1985    Smokeless tobacco: Never    Tobacco comments:     occasional   Substance Use Topics    Alcohol use:  No                                Ready to quit: Not Answered  Counseling given: Not Answered  Tobacco comments: occasional      Vital Signs (Current):   Vitals:    10/10/22 0635 10/10/22 1100 10/10/22 1145 10/10/22 1311   BP: (!) 159/102 (!) 159/83 (!) 168/71 (!) 162/73   Pulse: 88 94 90 86   Resp: 18 20 20    Temp: 97.6 °F (36.4 °C) 98.4 °F (36.9 °C) 97.6 °F (36.4 °C)    TempSrc:   Oral    SpO2: 92% 96% 93%    Weight: 213 lb 3 oz (96.7 kg) 208 lb 8.9 oz (94.6 kg)     Height:                                                  BP Readings from Last 3 Encounters:   10/10/22 (!) 162/73   12/18/21 (!) 176/85   09/13/21 (!) 186/87       NPO Status: Time of last liquid consumption: 2100                        Time of last solid consumption: 1700                        Date of last liquid consumption: 10/05/22                        Date of last solid food consumption: 10/05/22    BMI:   Wt Readings from Last 3 Encounters:   10/10/22 208 lb 8.9 oz (94.6 kg)   09/13/21 250 lb (113.4 kg)   07/26/21 240 lb (108.9 kg)     Body mass index is 26.07 kg/m².    CBC:   Lab Results   Component Value Date/Time    WBC 4.8 10/08/2022 03:47 AM    RBC 3.31 10/08/2022 03:47 AM    RBC 5.03 02/24/2012 03:50 PM    HGB 10.4 10/08/2022 03:47 AM    HCT 33.4 10/08/2022 03:47 AM    .9 10/08/2022 03:47 AM    RDW 15.0 04/03/2018 08:12 PM     10/08/2022 03:47 AM       CMP:   Lab Results   Component Value Date/Time     10/10/2022 03:35 AM    K 5.2 10/10/2022 03:35 AM    K 5.2 09/29/2022 04:46 AM    CL 99 10/10/2022 03:35 AM    CO2 24 10/10/2022 03:35 AM    BUN 77 10/10/2022 03:35 AM    CREATININE 10.6 10/10/2022 03:35 AM    LABGLOM 6 10/10/2022 03:35 AM    GLUCOSE 93 10/10/2022 03:35 AM    GLUCOSE 104 02/16/2012 11:20 AM    PROT 6.5 09/29/2022 04:46 AM    CALCIUM 9.3 10/10/2022 03:35 AM    BILITOT 0.3 09/29/2022 04:46 AM    ALKPHOS 86 09/29/2022 04:46 AM    AST 9 09/29/2022 04:46 AM    ALT 8 09/29/2022 04:46 AM       POC Tests: No results for input(s): POCGLU, POCNA, POCK, POCCL, POCBUN, POCHEMO, POCHCT in the last 72 hours.     Coags:   Lab Results   Component Value Date/Time    INR 1.03 09/16/2020 11:03 PM    APTT 32.7 09/02/2020 03:30 AM       HCG (If Applicable): No results found for: PREGTESTUR, PREGSERUM, HCG, HCGQUANT     ABGs: No results found for: PHART, PO2ART, HQO0RTT, VZF0EQZ, BEART, C4SSEYWB     Type & Screen (If Applicable):  Lab Results   Component Value Date    LABRH POS 09/16/2020       Drug/Infectious Status (If Applicable):  Lab Results   Component Value Date/Time    HEPCAB Negative 03/26/2018 09:19 AM       COVID-19 Screening (If Applicable):   Lab Results   Component Value Date/Time    COVID19 NOT  DETECTED 09/28/2022 10:08 AM           Anesthesia Evaluation  Patient summary reviewed no history of anesthetic complications:   Airway: Mallampati: II  TM distance: >3 FB   Neck ROM: full  Mouth opening: > = 3 FB   Dental:          Pulmonary:normal exam    (+) sleep apnea:  current smoker    (-) COPD and asthma          Patient did not smoke on day of surgery. Cardiovascular:  Exercise tolerance: good (>4 METS),   (+) hypertension:, murmur,     (-) past MI and dysrhythmias             ROS comment: Hx of Aortic dissection    Echo 9/2022  Left ventricle size is normal.   Severely increased left ventricle wall thickness. Severe concentric left ventricular hypertrophy. Systolic function was normal.   Ejection fraction is visually estimated at 60%. The left atrium is Moderately dilated. Moderately enlarged right atrium size. Mild systolic anterior motion (CASSIE) of anterior leaflet. Moderate LVOT Obstruction   Moderately elevated left ventricular outflow tract velocity 3 m/sec . Moderately elevated Peak left ventricular outflow tract gradient of 36   mmhg . Neuro/Psych:      (-) seizures and CVA           GI/Hepatic/Renal:   (+) renal disease: ESRD and dialysis,      (-) GERD and liver disease       Endo/Other:        (-) diabetes mellitus, hypothyroidism, hyperthyroidism               Abdominal:             Vascular:     - DVT and PE. Other Findings:           Anesthesia Plan      general     ASA 4     (JORDON. PIV. Additional access can be obtained after induction if needed. Standard ASA monitors. IV/PO opioids and other adjuncts as needed for pain control. PACU post op for recovery. Possible anesthetics complications were discussed with the patient, including but not limited to: PONV, damage to the airway and surrounding structures (teeth, lips, gums, tongue, etc.), adverse reactions to medicine, cardiac complications (MI, CHF, arrhythmias, etc.), respiratory complications (post-op ventilation, respiratory failure, etc.), neurologic complications (nerve damage, stroke, seizure), and death. The patient was given the opportunity to ask questions and all questions were answered to the patient's satisfaction. The patient is in agreement with the anesthetic plan.  )  Induction: intravenous.   arterial line    Anesthetic plan and risks discussed with patient, spouse, sibling and child/children. Plan discussed with CRNA.                     Heather Schafer DO   10/10/2022

## 2022-10-11 ENCOUNTER — APPOINTMENT (OUTPATIENT)
Dept: GENERAL RADIOLOGY | Age: 57
DRG: 163 | End: 2022-10-11
Payer: MEDICARE

## 2022-10-11 LAB
ANION GAP SERPL CALCULATED.3IONS-SCNC: 10 MEQ/L (ref 8–16)
BASOPHILS # BLD: 0.2 %
BASOPHILS ABSOLUTE: 0 THOU/MM3 (ref 0–0.1)
BUN BLDV-MCNC: 37 MG/DL (ref 7–22)
CALCIUM SERPL-MCNC: 9 MG/DL (ref 8.5–10.5)
CHLORIDE BLD-SCNC: 101 MEQ/L (ref 98–111)
CO2: 25 MEQ/L (ref 23–33)
CREAT SERPL-MCNC: 7.2 MG/DL (ref 0.4–1.2)
EOSINOPHIL # BLD: 0.1 %
EOSINOPHILS ABSOLUTE: 0 THOU/MM3 (ref 0–0.4)
ERYTHROCYTE [DISTWIDTH] IN BLOOD BY AUTOMATED COUNT: 13.2 % (ref 11.5–14.5)
ERYTHROCYTE [DISTWIDTH] IN BLOOD BY AUTOMATED COUNT: 49.1 FL (ref 35–45)
GFR SERPL CREATININE-BSD FRML MDRD: 10 ML/MIN/1.73M2
GLUCOSE BLD-MCNC: 135 MG/DL (ref 70–108)
HCT VFR BLD CALC: 36 % (ref 42–52)
HEMOGLOBIN: 11 GM/DL (ref 14–18)
IMMATURE GRANS (ABS): 0.03 THOU/MM3 (ref 0–0.07)
IMMATURE GRANULOCYTES: 0.2 %
LYMPHOCYTES # BLD: 4 %
LYMPHOCYTES ABSOLUTE: 0.6 THOU/MM3 (ref 1–4.8)
MCH RBC QN AUTO: 31.4 PG (ref 26–33)
MCHC RBC AUTO-ENTMCNC: 30.6 GM/DL (ref 32.2–35.5)
MCV RBC AUTO: 102.9 FL (ref 80–94)
MONOCYTES # BLD: 7.1 %
MONOCYTES ABSOLUTE: 1 THOU/MM3 (ref 0.4–1.3)
NUCLEATED RED BLOOD CELLS: 0 /100 WBC
PLATELET # BLD: 134 THOU/MM3 (ref 130–400)
PMV BLD AUTO: 9.4 FL (ref 9.4–12.4)
POTASSIUM SERPL-SCNC: 4.9 MEQ/L (ref 3.5–5.2)
POTASSIUM SERPL-SCNC: 5.7 MEQ/L (ref 3.5–5.2)
POTASSIUM SERPL-SCNC: 5.9 MEQ/L (ref 3.5–5.2)
RBC # BLD: 3.5 MILL/MM3 (ref 4.7–6.1)
SEG NEUTROPHILS: 88.4 %
SEGMENTED NEUTROPHILS ABSOLUTE COUNT: 12.6 THOU/MM3 (ref 1.8–7.7)
SODIUM BLD-SCNC: 136 MEQ/L (ref 135–145)
WBC # BLD: 14.2 THOU/MM3 (ref 4.8–10.8)

## 2022-10-11 PROCEDURE — 6370000000 HC RX 637 (ALT 250 FOR IP)

## 2022-10-11 PROCEDURE — 94669 MECHANICAL CHEST WALL OSCILL: CPT

## 2022-10-11 PROCEDURE — 85025 COMPLETE CBC W/AUTO DIFF WBC: CPT

## 2022-10-11 PROCEDURE — 6370000000 HC RX 637 (ALT 250 FOR IP): Performed by: INTERNAL MEDICINE

## 2022-10-11 PROCEDURE — 80048 BASIC METABOLIC PNL TOTAL CA: CPT

## 2022-10-11 PROCEDURE — 6370000000 HC RX 637 (ALT 250 FOR IP): Performed by: NURSE PRACTITIONER

## 2022-10-11 PROCEDURE — APPSS30 APP SPLIT SHARED TIME 16-30 MINUTES: Performed by: PHYSICIAN ASSISTANT

## 2022-10-11 PROCEDURE — 97530 THERAPEUTIC ACTIVITIES: CPT

## 2022-10-11 PROCEDURE — 6370000000 HC RX 637 (ALT 250 FOR IP): Performed by: PHYSICIAN ASSISTANT

## 2022-10-11 PROCEDURE — 71045 X-RAY EXAM CHEST 1 VIEW: CPT

## 2022-10-11 PROCEDURE — 36415 COLL VENOUS BLD VENIPUNCTURE: CPT

## 2022-10-11 PROCEDURE — 6360000002 HC RX W HCPCS: Performed by: INTERNAL MEDICINE

## 2022-10-11 PROCEDURE — 84132 ASSAY OF SERUM POTASSIUM: CPT

## 2022-10-11 PROCEDURE — 99232 SBSQ HOSP IP/OBS MODERATE 35: CPT | Performed by: INTERNAL MEDICINE

## 2022-10-11 PROCEDURE — 94760 N-INVAS EAR/PLS OXIMETRY 1: CPT

## 2022-10-11 PROCEDURE — 6370000000 HC RX 637 (ALT 250 FOR IP): Performed by: STUDENT IN AN ORGANIZED HEALTH CARE EDUCATION/TRAINING PROGRAM

## 2022-10-11 PROCEDURE — 2700000000 HC OXYGEN THERAPY PER DAY

## 2022-10-11 PROCEDURE — 6360000002 HC RX W HCPCS: Performed by: PHYSICIAN ASSISTANT

## 2022-10-11 PROCEDURE — 2060000000 HC ICU INTERMEDIATE R&B

## 2022-10-11 PROCEDURE — 2580000003 HC RX 258: Performed by: PHYSICIAN ASSISTANT

## 2022-10-11 RX ORDER — SODIUM POLYSTYRENE SULFONATE 15 G/60ML
30 SUSPENSION ORAL; RECTAL ONCE
Status: COMPLETED | OUTPATIENT
Start: 2022-10-11 | End: 2022-10-11

## 2022-10-11 RX ADMIN — ISOSORBIDE MONONITRATE 120 MG: 60 TABLET, EXTENDED RELEASE ORAL at 10:40

## 2022-10-11 RX ADMIN — SERTRALINE 100 MG: 100 TABLET, FILM COATED ORAL at 10:42

## 2022-10-11 RX ADMIN — MORPHINE SULFATE 2 MG: 2 INJECTION, SOLUTION INTRAMUSCULAR; INTRAVENOUS at 22:18

## 2022-10-11 RX ADMIN — CLONIDINE HYDROCHLORIDE 0.3 MG: 0.2 TABLET ORAL at 10:35

## 2022-10-11 RX ADMIN — FAMOTIDINE 20 MG: 20 TABLET ORAL at 10:38

## 2022-10-11 RX ADMIN — SENNOSIDES 8.6 MG: 8.6 TABLET, COATED ORAL at 20:22

## 2022-10-11 RX ADMIN — FERROUS SULFATE TAB 325 MG (65 MG ELEMENTAL FE) 325 MG: 325 (65 FE) TAB at 10:40

## 2022-10-11 RX ADMIN — Medication 10 ML: at 10:46

## 2022-10-11 RX ADMIN — DOCUSATE SODIUM 100 MG: 100 CAPSULE, LIQUID FILLED ORAL at 10:37

## 2022-10-11 RX ADMIN — DEXAMETHASONE 0.5 MG: 0.5 TABLET ORAL at 10:36

## 2022-10-11 RX ADMIN — OXYCODONE AND ACETAMINOPHEN 2 TABLET: 5; 325 TABLET ORAL at 20:22

## 2022-10-11 RX ADMIN — MINOXIDIL 2.5 MG: 2.5 TABLET ORAL at 10:41

## 2022-10-11 RX ADMIN — DOXYCYCLINE HYCLATE 100 MG: 100 TABLET, COATED ORAL at 20:22

## 2022-10-11 RX ADMIN — SODIUM CHLORIDE, PRESERVATIVE FREE 10 ML: 5 INJECTION INTRAVENOUS at 10:48

## 2022-10-11 RX ADMIN — DOCUSATE SODIUM 100 MG: 100 CAPSULE, LIQUID FILLED ORAL at 20:22

## 2022-10-11 RX ADMIN — OXYCODONE AND ACETAMINOPHEN 2 TABLET: 5; 325 TABLET ORAL at 06:39

## 2022-10-11 RX ADMIN — SODIUM ZIRCONIUM CYCLOSILICATE 10 G: 10 POWDER, FOR SUSPENSION ORAL at 11:12

## 2022-10-11 RX ADMIN — Medication 1 TABLET: at 10:41

## 2022-10-11 RX ADMIN — FOLIC ACID 1000 MCG: 1 TABLET ORAL at 10:39

## 2022-10-11 RX ADMIN — MORPHINE SULFATE 2 MG: 2 INJECTION, SOLUTION INTRAMUSCULAR; INTRAVENOUS at 10:17

## 2022-10-11 RX ADMIN — FLUTICASONE PROPIONATE 1 SPRAY: 50 SPRAY, METERED NASAL at 10:43

## 2022-10-11 RX ADMIN — MORPHINE SULFATE 1 MG: 2 INJECTION, SOLUTION INTRAMUSCULAR; INTRAVENOUS at 18:48

## 2022-10-11 RX ADMIN — OXYCODONE AND ACETAMINOPHEN 2 TABLET: 5; 325 TABLET ORAL at 15:45

## 2022-10-11 RX ADMIN — DOXYCYCLINE HYCLATE 100 MG: 100 TABLET, COATED ORAL at 10:39

## 2022-10-11 RX ADMIN — ATORVASTATIN CALCIUM 40 MG: 40 TABLET, FILM COATED ORAL at 20:22

## 2022-10-11 RX ADMIN — SODIUM POLYSTYRENE SULFONATE 30 G: 15 SUSPENSION ORAL; RECTAL at 15:41

## 2022-10-11 RX ADMIN — Medication 10 ML: at 10:47

## 2022-10-11 RX ADMIN — VERAPAMIL HYDROCHLORIDE 240 MG: 240 TABLET, FILM COATED, EXTENDED RELEASE ORAL at 10:43

## 2022-10-11 ASSESSMENT — PAIN DESCRIPTION - DESCRIPTORS
DESCRIPTORS: THROBBING
DESCRIPTORS: THROBBING
DESCRIPTORS: DISCOMFORT;ACHING
DESCRIPTORS: ACHING;DISCOMFORT;DULL
DESCRIPTORS: ACHING;SHARP
DESCRIPTORS: ACHING

## 2022-10-11 ASSESSMENT — PAIN DESCRIPTION - LOCATION
LOCATION: RIB CAGE
LOCATION: GENERALIZED
LOCATION: RIB CAGE

## 2022-10-11 ASSESSMENT — PAIN DESCRIPTION - ORIENTATION
ORIENTATION: RIGHT

## 2022-10-11 ASSESSMENT — PAIN SCALES - GENERAL
PAINLEVEL_OUTOF10: 8
PAINLEVEL_OUTOF10: 8
PAINLEVEL_OUTOF10: 6
PAINLEVEL_OUTOF10: 5
PAINLEVEL_OUTOF10: 6
PAINLEVEL_OUTOF10: 8
PAINLEVEL_OUTOF10: 9
PAINLEVEL_OUTOF10: 6
PAINLEVEL_OUTOF10: 9
PAINLEVEL_OUTOF10: 10

## 2022-10-11 ASSESSMENT — PAIN DESCRIPTION - FREQUENCY: FREQUENCY: CONTINUOUS

## 2022-10-11 ASSESSMENT — PAIN DESCRIPTION - PAIN TYPE: TYPE: SURGICAL PAIN

## 2022-10-11 ASSESSMENT — PAIN DESCRIPTION - ONSET: ONSET: ON-GOING

## 2022-10-11 NOTE — OP NOTE
Operative Note      Patient: Rosaura Nino  YOB: 1965  MRN: [id number]    Date of Procedure: 10/10/2022    Pre-Op Diagnosis: Hypoxia [R09.02], empyema right chest status post failure of thrombolytic treatment  Trapped lung [J98.19]    Post-Op Diagnosis: Same       Right thoracotomy with complete decortication of lung, multiple wedge resections of lung from upper lobe middle lobe and lower lobe, cryoanalgesia to intercostal nerves    Surgeon(s):  Marissa Gomez MD    Assistant:   First Assistant: Lizbet Sanon PA-C    Anesthesia: General    Estimated Blood Loss (mL): 665     Complications: None    Specimens:   ID Type Source Tests Collected by Time Destination   1 : Lung fluid  Body Fluid Lung CULTURE, ANAEROBIC AND AEROBIC Marissa Gomez MD 79/01/7742 1788    A : Right Upper lobe wedge Tissue Lung SURGICAL PATHOLOGY Marissa Gomez MD 53/75/1586 4796    B : Middle lobe resection  Tissue Lung SURGICAL PATHOLOGY Marissa Gomez MD 84/33/2308 6827    C : Lower Lobe Resection  Tissue Lung SURGICAL PATHOLOGY Marissa Gomez MD 53/81/4932 3010    D : Wedge resection right middle lobe  Tissue Lung SURGICAL PATHOLOGY Marissa Gomez MD 85/36/6505 5548    E : Right Lower wedge resection  Tissue Lung SURGICAL PATHOLOGY Marissa Gomez MD 31/62/1419 1004        Implants:  * No implants in log *      Drains:   Chest Tube Right 1 (Active)   Suction -20 cm H2O 10/10/22 1905   Dressing Status Clean, dry & intact 10/10/22 1905       Chest Tube Right 2 (Active)   Suction -20 cm H2O 10/10/22 1905   Dressing Status Clean, dry & intact 10/10/22 1905       Chest Tube Right 3 (Active)   Suction -20 cm H2O 10/10/22 1905   Dressing Status Clean, dry & intact 10/10/22 1905       Hemodialysis Arteriovenous fistula Left Arm (Active)   Site Assessment Clean, dry & intact 10/10/22 1200   Thrill Present 10/10/22 1200   Bruit Present 10/10/22 1200   Dressing Status Clean, dry & intact 10/09/22 1800       [REMOVED] Chest Tube Right (Removed)   Chest Tube Airleak No 10/09/22 2026   Status Continuous Suction 10/09/22 2026   Suction -20 cm H2O 10/09/22 2026   Y Connector Used No 10/09/22 1342   Drainage Description Yellow 10/09/22 2026   Dressing Status Clean, dry & intact 10/09/22 2026   Chest Tube Dressing Dry 10/09/22 0716   Site Assessment Clean, dry & intact 10/09/22 2026   Surrounding Skin Unable to view 10/09/22 2026   Output (ml) 50 ml 10/09/22 2026       Findings: See operative report    Detailed Description of Procedure:   Patient was taken to the OR placed on table supine position prepped and draped in usual sterile manner timeout was performed right posterior lateral thoracotomy was made chest was entered at 6 intercostal space immediately upon entering the chest found dense adhesions between the lung and chest wall there is a large amount of yellow purulent fluid and gelatinous material.  This was removed. Most lung was found to be encased in dense rind of scar tissue. The entire lower lobe, most of the middle lobe and part of the upper lobe were trapped. Dissection was quite tedious and feeling the rind off the lung was time-consuming. Multiple times the lung parenchyma was entered. Ultimately the air leak was controlled at the end with stapling and wedge resection. 4 wedge resections were performed in all. Dissection became extremely treacherous at the mediastinum and for fear of entering great vessels or major structures was not able to completely remove all the scar tissue from this area or the diaphragmatic surface of the lung. I did remove most of it though and obtained excellent reexpansion of the right lung.   Chest was irrigated copiously with saline solution 3 chest tubes were placed to the base and 1 at the apex ribs of the cryoanalgesia was performed to intercostal nerves V, VI and VII ribs were approximated with #2 intercostal Vicryl sutures muscle layers with 0 Vicryl the remainder of the wound was closed in layers sterile dressing applied patient taken to the recovery room after being extubated stable condition. Chest x-ray showed excellent reexpansion of the lung.     Electronically signed by Shoaib Garzon MD on 42/28/4185 at 8:09 PM

## 2022-10-11 NOTE — PROGRESS NOTES
[facility]   Nephrology progress note  10/11/2022, 10:30 AM      Pt Name:    Samuel Boland  MRN:     [id number]     YOB: 1965  Admit Date:    9/28/2022  9:56 AM    Chief Complaint: Nephrology following for ESRD. Subjective:  Patient was seen and examined this morning. He is s/p right thoracotomy yesterday. Overall feeling ok besides some mild pain right chest.  Objective:  24HR INTAKE/OUTPUT:    Intake/Output Summary (Last 24 hours) at 10/11/2022 1030  Last data filed at 10/11/2022 0413  Gross per 24 hour   Intake 1560 ml   Output 3600 ml   Net -2040 ml         I/O last 3 completed shifts: In: 1560 [P.O.:150; I.V.:1010]  Out: 3650 [Blood:300; Chest Tube:950]  No intake/output data recorded.    Admission weight: 250 lb (113.4 kg)  Wt Readings from Last 3 Encounters:   10/11/22 210 lb 3.2 oz (95.3 kg)   09/13/21 250 lb (113.4 kg)   07/26/21 240 lb (108.9 kg)        Vitals :   Vitals:    10/11/22 0413 10/11/22 0417 10/11/22 0500 10/11/22 0745   BP: (!) 113/51   (!) 161/58   Pulse: 77   89   Resp: 18  18 20   Temp: 97.7 °F (36.5 °C)   98.1 °F (36.7 °C)   TempSrc: Oral   Oral   SpO2: 98% 95%  95%   Weight: 210 lb 3.2 oz (95.3 kg)      Height:           Physical examination  General Appearance: alert and cooperative with exam, appears comfortable, no distress  Mouth/Throat: Oral mucosa moist  Neck: No JVD  Lungs: diminished, right sided chest tubes  Heart:  S1, S2 heard  GI: soft, non-tender  Extremities: improved LE edema, left arm AV fistula    Medications:  Infusion:    sodium chloride      sodium chloride       Meds:    sodium zirconium cyclosilicate  10 g Oral Once    sodium chloride flush  5-40 mL IntraVENous 2 times per day    minoxidil  2.5 mg Oral BID    ketamine  100 mg IntraVENous Once    doxycycline hyclate  100 mg Oral 2 times per day    dexamethasone  0.5 mg Oral Daily    docusate sodium  100 mg Oral BID    senna  1 tablet Oral Nightly    [Held by provider] aspirin 81 mg Oral Daily    atorvastatin  40 mg Oral Nightly    cloNIDine  0.3 mg Oral TID    doxazosin  8 mg Oral Nightly    famotidine  20 mg Oral Daily    ferrous sulfate  325 mg Oral Daily with breakfast    folic acid  6,647 mcg Oral Daily    fluticasone  1 spray Nasal Daily    isosorbide mononitrate  120 mg Oral Daily    sertraline  100 mg Oral Daily    verapamil  240 mg Oral Daily    multivitamin  1 tablet Oral Daily    sodium chloride flush  10 mL IntraVENous 2 times per day     Meds prn: sodium chloride flush, sodium chloride, nicotine polacrilex, oxyCODONE-acetaminophen **OR** oxyCODONE-acetaminophen, morphine **OR** morphine, promethazine **OR** ondansetron, albuterol sulfate HFA, hydrOXYzine HCl, traZODone, sodium chloride flush, sodium chloride, ondansetron **OR** [DISCONTINUED] ondansetron, polyethylene glycol, acetaminophen, HYDROcodone 5 mg - acetaminophen **OR** HYDROcodone 5 mg - acetaminophen, morphine     Lab Data :  CBC:   Recent Labs     10/11/22  0336   WBC 14.2*   HGB 11.0*   HCT 36.0*        CMP:  Recent Labs     10/09/22  0425 10/10/22  0335 10/11/22  0336    135 136   K 4.8 5.2 5.7*    99 101   CO2 25 24 25   BUN 60* 77* 37*   CREATININE 9.0* 10.6* 7.2*   GLUCOSE 87 93 135*   CALCIUM 9.1 9.3 9.0     Hepatic:   No results for input(s): LABALBU, AST, ALT, ALB, BILITOT, ALKPHOS in the last 72 hours. Assessment and Plan:    1. ESRD on HD   -MWF while inpatient      2. Hyperkalemia : K high today. Give Lokelma 10 grams. Repeat at noon  3. HTN: improved  4. Loculated pleural effusion: s/p thoracotomy   5. Anemia in CKD  6. Secondary hyperparathyroidism  7. Chronic fluid overload    D/W patient     Samuel Pryor DO  [facility]    This report has been created using voice recognition software.  It may contain minor errors which are inherent in voice recognition technology

## 2022-10-11 NOTE — PROGRESS NOTES
[facility] ICU STEPDOWN TELEMETRY 4K  Occupational Therapy  Daily Note  Time:   Time In: 4572  Time Out: 1350  Timed Code Treatment Minutes: 16 Minutes  Minutes: 16          Date: 10/11/2022  Patient Name: Lopez Zafar,   Gender: male      Room: -14/014-A  MRN: [id number]  : 1965  (56 y.o.)  Referring Practitioner: Dr. Konstantin Jones MD  Diagnosis: Hypoxia  Additional Pertinent Hx: Pt presented to [address] [place] [address] on 2022 with worsening shortness of breath at rest and with exertion. CXR showed large right pleural effusion and patient required PAP therapy for adequate oxygenation. Patient was admitted and chest tube placed on  with 2 L of serosanguineous fluid drainage. Dialysis also performed at  with 2 L of fluid. Repeat dialysis performed on  with albumin administration. Repeat dialysis performed on 2022. Pleural fluid analysis was consistent with exudative effusion with testing for malignancy pending. Suction for chest tube was discontinued on 10/01. Restrictions/Precautions:  Restrictions/Precautions: Fall Risk  Position Activity Restriction  Other position/activity restrictions: R chest tubes (10/10), Montior BP     SUBJECTIVE: Pt seated in bedside chair upon arrival, agreeable to OT session. PAIN: Did not rate: Neck, Chest tube site    Vitals: Blood Pressure: Upon standing in prep for mobility, pt became lightheaded an needing to return to chair immediately. BP reading 86/47- RN notified and session terminated for safety. RN stating she will address with hospitalist.  Oxygen: WNL on 2L O2 via nasal cannula. Heart Rate: 87 bpm    COGNITION: Slow Processing, Decreased Insight, Impaired Attention, and pt appears lethargic sating he is \"groggy\" from medications. ADL:   No ADL's completed this session. BALANCE:  Sitting Balance:  Supervision. Bedside chair  Standing Balance: Minimal Assistance, X 1.   Brief episode standing at RW.     BED MOBILITY:  Not Tested    TRANSFERS:  Sit to Stand:  [employer], X 1.   Stand to Sit: Minimal Assistance, X 1. To control decent  Comment: To/from bedside chair. FUNCTIONAL MOBILITY:  Assistive Device: Rolling Walker   Assist Level:  Minimal Assistance and X 1. Distance:   Completed functional mobility taking one step forward/retro from chair- pt became lightheaded requiring return to chair (See Vitals Section). ADDITIONAL ACTIVITIES:  None completed d/t low BP reading with session ending early. ASSESSMENT:     Activity Tolerance:  Patient tolerance of  treatment: fair. Discharge Recommendations: Continue to assess pending progress  Equipment Recommendations: Equipment Needed: No  Plan: Times Per Week: 5x  Times Per Day: Once a day  Specific Instructions for Next Treatment: Functional mobility; ADLs and energy conservation techniques; UE exercises with steady breathing. Current Treatment Recommendations: Strengthening, Balance training, Functional mobility training, Endurance training, Safety education & training, Self-Care / ADL  Additional Comments: Pt would benefit from continued skilled OT services when medically stable and discharged from Acute. Patient Education  Patient Education: Assistive Device Safety and standing balance, and safety with transfers/mobility. Goals  Short Term Goals  Time Frame for Short Term Goals: By discharge  Short Term Goal 1: Pt will demonstrate functional mobility walking to/from the bathroom or in the kitchenette while using any AD needed with SBA and verbal cues for management of the O2 line to prepare for doing self care at home and move about safely. Short Term Goal 2: Pt will complete a spongebath or shower with SBA while following energy conservation techniques including planning and pacing to increase his activity tolerance for ease of doing his mourning routine.   Short Term Goal 3: Pt will complete BUE ROM/high resistance exercises while using proper breathing technique to increase his strength and endurance for ease of doing ADLs or going in/out of his home. Short Term Goal 4: Pt will complete toileting routine including transfers to/from the standard toilet seat with supervision to increase his independence with self care. Additional Goals?: No    Following session, patient left in safe position with all fall risk precautions in place.

## 2022-10-11 NOTE — PROGRESS NOTES
CRITICAL CARE PROGRESS NOTE      Patient:  Sindi Aaron    Unit/Bed:4K-14/014-A  YOB: 1965  MRN: [id number]   PCP: Mendoza Oconnor MD  Date of Admission: 9/28/2022  Chief Complaint:-Right pleural effusion    Assessment and Plan:    Acute hypoxic respiratory failure: Chest x-ray on 9/28/2022, \"Large right pleural effusion with associated compressive atelectasis or right lower lobe collapse. Mild increase groundglass opacities in the left lung are nonspecific but can suggest underlying pulmonary edema or pneumonia. \". Patient is status post chest tube placement on 9/28/2022 and s/p tPA chemical pneumolysis 10/4, (output overnight 130cc serosanguinous fluid). CT surgery to performed a right thoracotomy with complete decortication of lung, multiple wedge resections of lung from upper lobe middle lobe and lower lobe, cryoanalgesia to intercostal nerves. Chest x-ray from 10/11/2022 shows stable appearance of chest with right-sided apical pneumothorax and small bilateral pleural effusions. CT Surgery is following. Chest tube drained 340 mL overnight, positive airleak on both chest tubes, -20 to suction, draining serosanguinous fluid. Right lung pleural effusion:  Pleural fluid studies indicate exudative. Doxycycline for CPA recommended by pulm. Continue decadron 0.5 mg qdaily. CXR shows stable appearance of the chest with right-sided apical pneumothorax and small bilateral pleural effusions. See above for chest tube drainage. History of thoracic and aortic aneurysm:  s/p endograft in thoracic aorta, CT 9/30/2022 \"The areas of hyperdensities in the mural thrombus of the distal descending aorta were not present on the prior examination of 2/26/2020. \"  Stable at this time we will continue to monitor. ESRD: Creatinine on 10/11/2022 is 7.2. Patient receives dialysis on a Monday/Wednesday/Friday schedule. Nephrology following for management. Diabetes mellitus: Glucose is 135 on 10/11/2022.   We will continue to monitor. GERD: pepcid 20 mg  Tobacco abuse: Patient has a history of tobacco use. Patient does not request a nicotine patch but is available if patient would like. Hyperlipidemia:   Home medications include Lipitor 40 mg, will continue  Hypertension: Currently on clonidine 0.3 mg 3 times daily, Imdur 120 mg daily, verapamil 240 mg daily, will continue at this time. Depression: Continue Zoloft 100 mg daily  History of cocaine use: Offer addiction services counseling if needed. INITIAL H AND P AND ICU COURSE:  This is a 68-year-old male on hospital day 9 with a past medical history of AAA, end-stage renal disease on hemodialysis, diabetes type 2 (not insulin-requiring), hyperparathyroidism, who presented to the emergency department 9/28/2022 complaining of shortness of breath that had been going on for about a month as well  (dyspnea on exertion and orthopnea) along with leg swelling. He was found to have a large right-sided pleural effusion and admitted to stepdown for further management at that time. Chest tube placed, which returned large volume of serosanguinous fluid, bronch lavage and fluid sampling. On 10/6/2022, bronchoscopy was performed. Showed chronic inflammation with pitting airway disease, no airway obstruction, no mass identified. On 10/10/2022, CT surgery performed a right thoracotomy with complete decortication of lung, multiple wedge resections of lung from upper lobe middle lobe and lower lobe, cryoanalgesia to intercostal nerves. On 10/11/2022, chest tube drained 240 mL overnight, positive airleak, suction set to -20, draining serosanguinous fluid. Patient is doing well and sitting up right in bed, denies any pain at the site.     Past Medical History:    Past Medical History:   Diagnosis Date    AAA (abdominal aortic aneurysm)     NURIS (acute kidney injury) ([facility] 9/24/2015    Anemia associated with chronic renal failure     Arthritis     stated in hands Cocaine abuse ([facility] 5/10/2014    Depression     Diabetes mellitus ([facility]     pt states he no longer has diabetes he has lost alot of davion.      FSGS (focal segmental glomerulosclerosis) 5/23/2013    Hemodialysis patient ([facility] 10/17/2016    on hemodialysis with [facility] 1/16/2012    History of blood transfusion     Hyperlipidemia     Hyperphosphatemia 5/21/2016    Hypertension     Left renal artery stenosis ([facility] 5/22/2014    Monoclonal (M) protein disease, multiple 'M' protein     Nicotine dependence 6/16/2014    Noncompliance     Pneumonia     Psychiatric problem     PTSD (post-traumatic stress disorder)     Pt [place] from [place]    Secondary hyperparathyroidism (of renal origin)     Sleep apnea        Family History:    Family History   Problem Relation Age of Onset    Cancer Mother     Other Brother         aneurysm        Social History:    Social History     Socioeconomic History    Marital status:      Spouse name: Andrea    Number of children: 2    Years of education: 12    Highest education level: Not on file   Occupational History     Employer: UNEMPLOYED   Tobacco Use    Smoking status: Some Days     Packs/day: 0.25     Years: 20.00     Pack years: 5.00     Types: Cigarettes     Start date: 10/11/1985    Smokeless tobacco: Never    Tobacco comments:     occasional   Vaping Use    Vaping Use: Never used   Substance and Sexual Activity    Alcohol use: No    Drug use: Not Currently     Comment: hx of    Sexual activity: Yes     Partners: Female   Other Topics Concern    Not on file   Social History Narrative    Not on file     Social Determinants of Health     Financial Resource Strain: Not on file   Food Insecurity: Not on file   Transportation Needs: Not on file   Physical Activity: Not on file   Stress: Not on file   Social Connections: Not on file   Intimate Partner Violence: Not on file   Housing Stability: Not on file         ROS   Denies any pain to palpation at site of chest tube. Scheduled Meds:   sodium zirconium cyclosilicate  10 g Oral Once    sodium chloride flush  5-40 mL IntraVENous 2 times per day    minoxidil  2.5 mg Oral BID    ketamine  100 mg IntraVENous Once    doxycycline hyclate  100 mg Oral 2 times per day    dexamethasone  0.5 mg Oral Daily    docusate sodium  100 mg Oral BID    senna  1 tablet Oral Nightly    [Held by provider] aspirin  81 mg Oral Daily    atorvastatin  40 mg Oral Nightly    cloNIDine  0.3 mg Oral TID    doxazosin  8 mg Oral Nightly    famotidine  20 mg Oral Daily    ferrous sulfate  325 mg Oral Daily with breakfast    folic acid  4,132 mcg Oral Daily    fluticasone  1 spray Nasal Daily    isosorbide mononitrate  120 mg Oral Daily    sertraline  100 mg Oral Daily    verapamil  240 mg Oral Daily    multivitamin  1 tablet Oral Daily    sodium chloride flush  10 mL IntraVENous 2 times per day     Continuous Infusions:   sodium chloride      sodium chloride         PHYSICAL EXAMINATION:  T:  98.1. P:  89. RR:  20. B/P:  161/58. O2 Sat:  95%. I/O:  -294  Body mass index is 26.27 kg/m². GCS:   15  General:   Middle aged male, sitting in bed, pleasant. HEENT:  normocephalic and atraumatic. No scleral icterus. PERR  Neck: supple. No Thyromegaly. Lungs: Chest tubes present on right side draining serosanguinous fluid. Lung sounds clear to auscultation. No retractions  Cardiac: RRR. No JVD. Abdomen: soft. Nontender. Extremities:  No clubbing, cyanosis, or edema x 4. Vasculature: capillary refill < 3 seconds. Palpable dorsalis pedis pulses. Skin:  warm and dry. Psych:  Alert and oriented x3. Affect appropriate  Lymph:  No supraclavicular adenopathy. Neurologic:  No focal deficit. No seizures. Data: (All radiographs, tracings, PFTs, and imaging are personally viewed and interpreted unless otherwise noted).    Sodium 136, potassium 5.7, chloride 101, BUN/creatinine 37/7.2, GFR 10, glucose 135  WBC 14.2, H&H 11.0 and 36.0, platelet 694  Telemetry shows sinus rhythm        Seen with multidisciplinary ICU team.  Meets Continued ICU Level Care Criteria:    [x] Yes   [] No - Transfer Planned to listed location:  [] HOSPITALIST CONTACTED-      Case and plan discussed with Dr. Tina Aguilar. Electronically signed by DO Robin Nolasco  Patient seen by me including key components of medical care. Case discussed with resident physician. Will sign over to thoracic surgery care of chest tube. Italicized font, if present,  represents changes to the note made by me. CC time  minutes. Time was discontiguous. Time does not include procedure. Time does include my direct assessment of the patient and coordination of care. Time represents more than 50% of the time involved with patient care by the [address] [place] [address] team.  Electronically signed by Dawn Epstein.  Tina Aguilar MD.

## 2022-10-11 NOTE — FLOWSHEET NOTE
10/11/22 3815   Safe Environment   Safety Measures Other (comment)  (VN safety round complete)   Pt responds to audio , permits video , pt setting up in chair , stated he is sore post procedure , pt stated he is using the incentive spirometer on his bedside table , visitors present in room , no voiced concerns or complaints , call light within reach

## 2022-10-11 NOTE — PLAN OF CARE
Problem: Discharge Planning  Goal: Discharge to home or other facility with appropriate resources  Outcome: Progressing  Flowsheets (Taken 10/11/2022 1455)  Discharge to home or other facility with appropriate resources:   Identify barriers to discharge with patient and caregiver   Arrange for needed discharge resources and transportation as appropriate   Identify discharge learning needs (meds, wound care, etc)     Problem: Pain  Goal: Verbalizes/displays adequate comfort level or baseline comfort level  Outcome: Progressing  Flowsheets (Taken 10/11/2022 1455)  Verbalizes/displays adequate comfort level or baseline comfort level:   Encourage patient to monitor pain and request assistance   Assess pain using appropriate pain scale   Administer analgesics based on type and severity of pain and evaluate response   Implement non-pharmacological measures as appropriate and evaluate response     Problem: Safety - Adult  Goal: Free from fall injury  Outcome: Progressing  Flowsheets (Taken 10/11/2022 1455)  Free From Fall Injury: Instruct family/caregiver on patient safety     Problem: ABCDS Injury Assessment  Goal: Absence of physical injury  Outcome: Progressing  Flowsheets (Taken 10/11/2022 1455)  Absence of Physical Injury: Implement safety measures based on patient assessment     Problem: Respiratory - Adult  Goal: Achieves optimal ventilation and oxygenation  Outcome: Progressing  Flowsheets (Taken 10/11/2022 1455)  Achieves optimal ventilation and oxygenation:   Assess for changes in respiratory status   Assess for changes in mentation and behavior   Position to facilitate oxygenation and minimize respiratory effort   Oxygen supplementation based on oxygen saturation or arterial blood gases   Initiate smoking cessation protocol as indicated   Encourage broncho-pulmonary hygiene including cough, deep breathe, incentive spirometry     Problem: Cardiovascular - Adult  Goal: Maintains optimal cardiac output and hemodynamic stability  Outcome: Progressing  Flowsheets (Taken 10/11/2022 1455)  Maintains optimal cardiac output and hemodynamic stability:   Monitor blood pressure and heart rate   Administer fluid and/or volume expanders as ordered     Problem: Skin/Tissue Integrity - Adult  Goal: Skin integrity remains intact  Outcome: Progressing  Flowsheets (Taken 10/11/2022 1455)  Skin Integrity Remains Intact: Monitor for areas of redness and/or skin breakdown     Problem: Infection - Adult  Goal: Absence of infection at discharge  Outcome: Progressing  Flowsheets (Taken 10/11/2022 1455)  Absence of infection at discharge:   Assess and monitor for signs and symptoms of infection   Monitor lab/diagnostic results   Monitor all insertion sites i.e., indwelling lines, tubes and drains     Problem: Metabolic/Fluid and Electrolytes - Adult  Goal: Electrolytes maintained within normal limits  Outcome: Progressing  Flowsheets (Taken 10/11/2022 1455)  Electrolytes maintained within normal limits:   Monitor labs and assess patient for signs and symptoms of electrolyte imbalances   Administer electrolyte replacement as ordered   Monitor response to electrolyte replacements, including repeat lab results as appropriate     Problem: Hematologic - Adult  Goal: Maintains hematologic stability  Outcome: Progressing  Flowsheets (Taken 10/11/2022 1455)  Maintains hematologic stability: Assess for signs and symptoms of bleeding or hemorrhage     Problem: Chronic Conditions and Co-morbidities  Goal: Patient's chronic conditions and co-morbidity symptoms are monitored and maintained or improved  Outcome: Progressing  Flowsheets (Taken 10/11/2022 1455)  Care Plan - Patient's Chronic Conditions and Co-Morbidity Symptoms are Monitored and Maintained or Improved: Monitor and assess patient's chronic conditions and comorbid symptoms for stability, deterioration, or improvement     Problem: Musculoskeletal - Adult  Goal: Return mobility to safest level of function  Outcome: Progressing  Flowsheets (Taken 10/11/2022 1455)  Return Mobility to Safest Level of Function:   Assess patient stability and activity tolerance for standing, transferring and ambulating with or without assistive devices   Assist with transfers and ambulation using safe patient handling equipment as needed   Ensure adequate protection for wounds/incisions during mobilization   Obtain physical therapy/occupational therapy consults as needed     Problem: Gastrointestinal - Adult  Goal: Minimal or absence of nausea and vomiting  Outcome: Progressing  Flowsheets (Taken 10/11/2022 1455)  Minimal or absence of nausea and vomiting:   Administer IV fluids as ordered to ensure adequate hydration   Provide nonpharmacologic comfort measures as appropriate     Problem: Genitourinary - Adult  Goal: Absence of urinary retention  Outcome: Progressing  Flowsheets (Taken 10/11/2022 1455)  Absence of urinary retention:   Assess patients ability to void and empty bladder   Monitor intake/output and perform bladder scan as needed

## 2022-10-11 NOTE — PLAN OF CARE
Problem: Discharge Planning  Goal: Discharge to home or other facility with appropriate resources  10/10/2022 2230 by Db Roach RN  Outcome: Progressing  Flowsheets (Taken 10/10/2022 2230)  Discharge to home or other facility with appropriate resources:   Identify barriers to discharge with patient and caregiver   Identify discharge learning needs (meds, wound care, etc)     Problem: Pain  Goal: Verbalizes/displays adequate comfort level or baseline comfort level  10/10/2022 2230 by Db Roach RN  Outcome: Progressing  Flowsheets (Taken 10/10/2022 2230)  Verbalizes/displays adequate comfort level or baseline comfort level:   Encourage patient to monitor pain and request assistance   Assess pain using appropriate pain scale   Implement non-pharmacological measures as appropriate and evaluate response   Administer analgesics based on type and severity of pain and evaluate response     Problem: Safety - Adult  Goal: Free from fall injury  10/10/2022 2230 by Db Roach RN  Outcome: Progressing  Flowsheets (Taken 10/10/2022 2230)  Free From Fall Injury: Instruct family/caregiver on patient safety     Problem: ABCDS Injury Assessment  Goal: Absence of physical injury  10/10/2022 2230 by Db Roach RN  Outcome: Progressing  Flowsheets (Taken 10/10/2022 2230)  Absence of Physical Injury: Implement safety measures based on patient assessment     Problem: Respiratory - Adult  Goal: Achieves optimal ventilation and oxygenation  10/10/2022 2230 by Db Roach RN  Outcome: Progressing  Flowsheets (Taken 10/10/2022 2230)  Achieves optimal ventilation and oxygenation:   Assess for changes in respiratory status   Position to facilitate oxygenation and minimize respiratory effort   Assess for changes in mentation and behavior   Oxygen supplementation based on oxygen saturation or arterial blood gases   Encourage broncho-pulmonary hygiene including cough, deep breathe, incentive spirometry   Assess and instruct to report shortness of breath or any respiratory difficulty     Problem: Cardiovascular - Adult  Goal: Maintains optimal cardiac output and hemodynamic stability  10/10/2022 2230 by Margo Nugent RN  Outcome: Progressing  Flowsheets (Taken 10/10/2022 2230)  Maintains optimal cardiac output and hemodynamic stability:   Monitor blood pressure and heart rate   Monitor urine output and notify Licensed Independent Practitioner for values outside of normal range   Assess for signs of decreased cardiac output  Goal: Absence of cardiac dysrhythmias or at baseline  10/10/2022 2230 by Margo Nugent RN  Outcome: Progressing  Flowsheets (Taken 10/10/2022 2230)  Absence of cardiac dysrhythmias or at baseline: Monitor cardiac rate and rhythm     Problem: Skin/Tissue Integrity - Adult  Goal: Skin integrity remains intact  10/10/2022 2230 by Margo Nugent RN  Outcome: Progressing  Flowsheets (Taken 10/10/2022 2228)  Skin Integrity Remains Intact:   Monitor for areas of redness and/or skin breakdown   Assess vascular access sites hourly  Goal: Incisions, wounds, or drain sites healing without S/S of infection  10/10/2022 2230 by Margo Nugent RN  Outcome: Progressing  Flowsheets (Taken 10/10/2022 2228)  Incisions, Wounds, or Drain Sites Healing Without Sign and Symptoms of Infection:   ADMISSION and DAILY: Assess and document risk factors for pressure ulcer development   TWICE DAILY: Assess and document dressing/incision, wound bed, drain sites and surrounding tissue   TWICE DAILY: Assess and document skin integrity  Goal: Oral mucous membranes remain intact  10/10/2022 2230 by Margo Nugent RN  Outcome: Progressing  Flowsheets (Taken 10/10/2022 2228)  Oral Mucous Membranes Remain Intact: Assess oral mucosa and hygiene practices    Problem: Infection - Adult  Goal: Absence of infection at discharge  10/10/2022 2230 by Margo Nugent RN  Outcome: Progressing  Flowsheets (Taken 10/10/2022 2230)  Absence of infection at discharge:   Assess and monitor for signs and symptoms of infection   Monitor lab/diagnostic results   Monitor all insertion sites i.e., indwelling lines, tubes and drains  Goal: Absence of infection during hospitalization  Outcome: Progressing  Flowsheets (Taken 10/10/2022 2230)  Absence of infection during hospitalization:   Assess and monitor for signs and symptoms of infection   Monitor lab/diagnostic results   Monitor all insertion sites i.e., indwelling lines, tubes and drains  Goal: Absence of fever/infection during anticipated neutropenic period  Outcome: Progressing  Flowsheets (Taken 10/10/2022 2230)  Absence of fever/infection during anticipated neutropenic period: Monitor white blood cell count     Problem: Metabolic/Fluid and Electrolytes - Adult  Goal: Electrolytes maintained within normal limits  Outcome: Progressing  Flowsheets (Taken 10/10/2022 2230)  Electrolytes maintained within normal limits: Monitor labs and assess patient for signs and symptoms of electrolyte imbalances  Goal: Hemodynamic stability and optimal renal function maintained  Outcome: Progressing  Flowsheets (Taken 10/10/2022 2230)  Hemodynamic stability and optimal renal function maintained:   Monitor labs and assess for signs and symptoms of volume excess or deficit   Monitor intake, output and patient weight   Encourage oral intake as appropriate   Monitor response to interventions for patient's volume status, including labs, urine output, blood pressure (other measures as available)  Goal: Glucose maintained within prescribed range  Outcome: Progressing  Flowsheets (Taken 10/10/2022 2230)  Glucose maintained within prescribed range:   Monitor blood glucose as ordered   Assess for signs and symptoms of hyperglycemia and hypoglycemia     Problem: Hematologic - Adult  Goal: Maintains hematologic stability  Outcome: Progressing  Flowsheets (Taken 10/10/2022 2230)  Maintains hematologic stability: Assess for signs and symptoms of bleeding or hemorrhage     Problem: Neurosensory - Adult  Goal: Achieves maximal functionality and self care  Outcome: Progressing  Flowsheets (Taken 10/10/2022 2230)  Achieves maximal functionality and self care:   Monitor swallowing and airway patency with patient fatigue and changes in neurological status   Encourage and assist patient to increase activity and self care with guidance from physical therapy/occupational therapy  Goal: Achieves stable or improved neurological status  Outcome: Progressing  Flowsheets (Taken 10/10/2022 2230)  Achieves stable or improved neurological status:   Assess for and report changes in neurological status   Maintain blood pressure and fluid volume within ordered parameters to optimize cerebral perfusion and minimize risk of hemorrhage   Monitor temperature, glucose, and sodium.  Initiate appropriate interventions as ordered     Problem: Musculoskeletal - Adult  Goal: Return mobility to safest level of function  Outcome: Progressing  Flowsheets (Taken 10/10/2022 2230)  Return Mobility to Safest Level of Function:   Assess patient stability and activity tolerance for standing, transferring and ambulating with or without assistive devices   Assist with transfers and ambulation using safe patient handling equipment as needed   Obtain physical therapy/occupational therapy consults as needed   Instruct patient/family in ordered activity level  Goal: Return ADL status to a safe level of function  Outcome: Progressing  Flowsheets (Taken 10/10/2022 2230)  Return ADL Status to a Safe Level of Function:   Administer medication as ordered   Obtain physical therapy/occupational therapy consults as needed   Assess activities of daily living deficits and provide assistive devices as needed   Assist and instruct patient to increase activity and self care as tolerated     Problem: Gastrointestinal - Adult  Goal: Minimal or absence of nausea and vomiting  Outcome: Progressing  Flowsheets (Taken 10/10/2022 2230)  Minimal or absence of nausea and vomiting:   Provide nonpharmacologic comfort measures as appropriate   Administer ordered antiemetic medications as needed  Goal: Maintains or returns to baseline bowel function  Outcome: Progressing  Flowsheets (Taken 10/10/2022 2230)  Maintains or returns to baseline bowel function:   Assess bowel function   Encourage mobilization and activity   Administer ordered medications as needed   Encourage oral fluids to ensure adequate hydration  Goal: Maintains adequate nutritional intake  Outcome: Progressing  Flowsheets (Taken 10/10/2022 2230)  Maintains adequate nutritional intake:   Monitor percentage of each meal consumed   Monitor intake and output, weight and lab values     Problem: Genitourinary - Adult  Goal: Absence of urinary retention  Outcome: Progressing  Flowsheets (Taken 10/10/2022 2230)  Absence of urinary retention:   Assess patients ability to void and empty bladder   Monitor intake/output and perform bladder scan as needed  Goal: Urinary catheter remains patent  Outcome: Completed     Problem: Skin/Tissue Integrity  Goal: Absence of new skin breakdown  Description: 1. Monitor for areas of redness and/or skin breakdown  2. Assess vascular access sites hourly  3. Every 4-6 hours minimum:  Change oxygen saturation probe site  4. Every 4-6 hours:  If on nasal continuous positive airway pressure, respiratory therapy assess nares and determine need for appliance change or resting period. Outcome: Progressing  Note: Patient free from any signs of infection. No new skin breakdown this shift. Patient encouraged and educated on the benefits of turning every 2 hours.      Problem: Chronic Conditions and Co-morbidities  Goal: Patient's chronic conditions and co-morbidity symptoms are monitored and maintained or improved  Outcome: Progressing     Problem: Nutrition Deficit:  Goal: Optimize nutritional status  Outcome: Progressing  Flowsheets (Taken 10/10/2022 2230)  Nutrient intake appropriate for improving, restoring, or maintaining nutritional needs:   Assess nutritional status and recommend course of action   Monitor oral intake, labs, and treatment plans   Care plan reviewed with patient and spouse. Patient and spouse verbalize understanding of the plan of care and contribute to goal setting.

## 2022-10-11 NOTE — SIGNIFICANT EVENT
Spoke with Dr. Faye Cho about signing this patient out to his service. Accepted the patient. He will start following the patient from tomorrow, 10/12/2022.

## 2022-10-11 NOTE — PROGRESS NOTES
Internal Medicine Resident Progress Note    Patient:  Katie Leal    YOB: 1965  Unit/Bed:4K-14/014-A  MRN: [id number]    Acct: [de-identified]   PCP: Rajendra Canales MD    Date of Admission: 9/28/2022      Assessment/Plan:  Pleural effusion, right  - Multifactorial; likely secondary to hemodialysis noncompliance  - S/p chest tube 09/28  - S/p chemical pneumolysis 10/04 with tPA and dornase injection  - S/p bronchoscopy 10/06  - S/p 10/10 Right Thoracotomy & Decortication with Partial Lung Resection; 3 chest tubes in place post procedure  - 900 cc serosanguineous drainage in the last 24 hours  - Pleural studies indicating exudative effusion with cytology negative for malignant cells  -CT surgery and pulmonology following    Acute hypoxic respiratory failure  - Patient maintaining adequate oxygenation on room air; titrate to maintain SPO2 92 to 96%  - S/p chest tube placement 09/28  - Likely secondary to pleural effusion as well as Hx polysubstance abuse as well as poor lung expansion  - Patient encouraged to use incentive spirometer  - S/p 10/10 Right Thoracotomy & Decortication with Partial Lung Resection; 3 chest tubes in place post procedure  - Continue 0.5 mg Decadron per pulmonology    Trapped lung, right  - S/p chest tube 09/28  - S/p chemical pneumolysis 10/04 with tPA and dornase injection  - S/p bronchoscopy 10/06  - 900 cc serosanguineous drainage in the last 24 hours  - Pleural studies indicating exudative effusion with cytology negative for malignant cells  -S/p 10/10 Right Thoracotomy & Decortication with Partial Lung Resection; 3 chest tubes in place post procedure  - Pain management per CT surgery    Right pneumothorax, apical  -Noted on 10/10/2022 CXR with estimated volume of 10%    Right lower lobe infiltrate  - Noted on CTA of the chest on 10/02  - Associated with exudative pleural effusion  - S/p chest tube placement 09/28  - S/p chemical pneumolysis 10/04 with tPA dornase injection  - S/p bronchoscopy 10/06  - Day 8/30 doxycycline per pulmonology recommendation  - Incentive spirometry, bronchodilation per pulmonology recommendations  - Repeat imaging to assess for interval changes per pulmonology  - S/p 10/10 Right Thoracotomy & Decortication with Partial Lung Resection; 3 chest tubes in place post procedure     ESRD, on hemodialysis  - Patient on MWF dialysis schedule  - Continue with low phosphorus and low potassium diet  - Nephrology following  - Judicious IV fluids and diuresis per nephrology recommendations  - Strict I's and O's  - S/p albumin infusion 10/07  -Patient started on kayexalate and lokelma per nephrology    Hyperkalemia  - Currently on MWF dialysis schedule  - Continue low potassium diet  -Patient ordered lokelma and kayexalate per nephrology  - Trend BMP    Systolic heart failure with preserved ejection fraction  - Echo 9/29 increased left ventricular wall thickness with EF 60%  - Strict I's and O's, daily weights  - Judicious IV fluids and diuresis per nephrology  - Continue Imdur  -10/10 dialysis with 2L fluid removal without complication    Hypertrophic cardiomyopathy  - Echo 9/29 showing severe left ventricular concentric hypertrophy as well as dilation of the left atrium  - Continue aspirin, statin, isosorbide mononitrate, verapamil, minoxidil  - Strict I's and O's, daily weights  - Judicious IV fluids and diuresis per nephrology    Primary HTN with current hypotension  - Continue to monitor vital signs  - Continue with clonidine, verapamil, minoxidil, and doxazosin regimen with hold parameters    PTSD  - Continue sertraline, Atarax, trazodone  - Mood stable    Unspecified depressive disorder  - Continue sertraline  - Mood stable    GERD  - Continue PPI    Polysubstance abuse  - Patient counseled on substance abuse cessation    HLD  - Continue atorvastatin    Anemia of chronic disease  - Trend CBC  - Continue iron and folate supplementation    REZA  - Patient tolerating BiPAP at night  - Recommendation for outpatient follow-up for PAP titration with sleep clinic    Thoracic spondylosis  - Noted on CTA of the chest on 10/02  - PT/OT consulted; 10/05 [facility] 15     Hyperphosphatemia, resolved  - Currently on MWF dialysis schedule  - Continue low phosphorus diet  - Patient's home phosphorus binder resumed  - Trend phosphorus and BMP    Pneumothorax, right lower lobe, right, resolved   Apical pneumothorax, right, resolved  Hx Thoracic Aneurysm  Hx AAA s/p aortic stent graft 7/2018  Hx COVID-19 pneumonia  Hx atrial flutter  Hx peripheral vascular disease  Hx hypertensive emergency  - Noted    Expected discharge date: 10/11/2022    Disposition:   [x] Home  [] TCU  [] Rehab  [] Psych  [] SNF  [] [place]  [] Other-    ===================================================================      Chief Complaint: Shortness of breath    Hospital Course: This is a 61-year-old -American male who presented to [address] [place] [address] on 09/28/2022 with worsening shortness of breath at rest and with exertion. CXR showed large right pleural effusion and patient required PAP therapy for adequate oxygenation. Patient was admitted and chest tube placed on 09/28 with 2 L of serosanguineous fluid drainage. Dialysis also performed at 09/28 with 2 L of fluid. Repeat dialysis performed on 09/29 with albumin administration. Repeat dialysis performed on 09/30/2022. Pleural fluid analysis was consistent with exudative effusion with negative cytology. Repeat CTA of the chest showed improvement in the right-sided pleural effusion, but showed evidence of a right apical pneumothorax which resolved on subsequent imaging. Repeat dialysis performed on 10/03 with 2 L removed. Dornase and alteplase administered through chest tube on 10/04 by intensivist.  Hemodialysis performed on 10/05 with 2 L of fluid removed. Bronchoscopy performed on 69/53 without complication.   There was evidence of chronic inflammation with pitting airway disease with no mass identified. Dialysis performed on 10/07 with 0.5 L of fluid removed. Dialysis performed on 10/10 with 2L fluid removed. Subjective (past 24 hours):   Patient seen and evaluated at bedside. He denies chest pain, fever, chills, nausea, vomiting, and diarrhea. He is tolerating his chest tubes without any right-sided pain. He states that he is eating well. He endorses lightheadedness after receiving his blood pressure medications. He was encouraged to increase PO intake of fluid. Denies any other acute symptoms or concerns. ROS: reviewed complete ROS unchanged unless otherwise stated in hospital course/subjective portion.        Medications:  Reviewed    Infusion Medications    sodium chloride      sodium chloride       Scheduled Medications    sodium chloride flush  5-40 mL IntraVENous 2 times per day    minoxidil  2.5 mg Oral BID    ketamine  100 mg IntraVENous Once    doxycycline hyclate  100 mg Oral 2 times per day    dexamethasone  0.5 mg Oral Daily    docusate sodium  100 mg Oral BID    senna  1 tablet Oral Nightly    [Held by provider] aspirin  81 mg Oral Daily    atorvastatin  40 mg Oral Nightly    cloNIDine  0.3 mg Oral TID    doxazosin  8 mg Oral Nightly    famotidine  20 mg Oral Daily    ferrous sulfate  325 mg Oral Daily with breakfast    folic acid  8,344 mcg Oral Daily    fluticasone  1 spray Nasal Daily    isosorbide mononitrate  120 mg Oral Daily    sertraline  100 mg Oral Daily    verapamil  240 mg Oral Daily    multivitamin  1 tablet Oral Daily    sodium chloride flush  10 mL IntraVENous 2 times per day     PRN Meds: sodium chloride flush, sodium chloride, nicotine polacrilex, oxyCODONE-acetaminophen **OR** oxyCODONE-acetaminophen, morphine **OR** morphine, promethazine **OR** ondansetron, albuterol sulfate HFA, hydrOXYzine HCl, traZODone, sodium chloride flush, sodium chloride, ondansetron **OR** [DISCONTINUED] ondansetron, polyethylene glycol, acetaminophen, morphine        Intake/Output Summary (Last 24 hours) at 10/11/2022 1647  Last data filed at 10/11/2022 1415  Gross per 24 hour   Intake 1360 ml   Output 1597 ml   Net -237 ml       Exam:  BP (!) 118/52   Pulse 85   Temp 98.5 °F (36.9 °C) (Oral)   Resp 18   Ht 6' 3\" (1.905 m)   Wt 210 lb 3.2 oz (95.3 kg)   SpO2 97%   BMI 26.27 kg/m²     General: No distress, appears stated age. Eyes:  PERRL. Conjunctivae/corneas clear. HENT: Head normal appearing. Nares normal. Oral mucosa moist.  Hearing intact. Neck: Supple, with full range of motion. Trachea midline. No gross JVD appreciated. Respiratory: Normal respiratory effort. Crackles auscultated in the right lower lobe. .  No wheezes or rhonchi. Chest tubes in place draining serosanguineous fluid. Nasal cannula in place. Cardiovascular: Normal rate, regular rhythm with normal S1/S2 without murmurs. No lower extremity edema. Abdomen: Soft, non-tender, non-distended with normal bowel sounds. Musculoskeletal: No joint swelling or tenderness. Normal tone. No abnormal movements. Protuberance associated with dialysis port in left arm  Skin: Warm and dry. No rashes or lesions. Neurologic:  No focal sensory/motor deficits in the upper or lower extremities. Cranial nerves:  grossly non-focal 2-12. Psychiatric: Alert and oriented, normal insight and thought content. Capillary Refill: Brisk,< 3 seconds. Peripheral Pulses: +2 palpable, equal bilaterally. Labs:   Recent Labs     10/11/22  0336   WBC 14.2*   HGB 11.0*   HCT 36.0*        Recent Labs     10/09/22  0425 10/10/22  0335 10/11/22  0336 10/11/22  1129    135 136  --    K 4.8 5.2 5.7* 5.9*    99 101  --    CO2 25 24 25  --    BUN 60* 77* 37*  --    CREATININE 9.0* 10.6* 7.2*  --    CALCIUM 9.1 9.3 9.0  --      No results for input(s): AST, ALT, BILIDIR, BILITOT, ALKPHOS in the last 72 hours. No results for input(s): INR in the last 72 hours.   No results for input(s): Shireen Collins in the last 72 hours. No results for input(s): PROCAL in the last 72 hours. Lab Results   Component Value Date/Time    NITRU NEGATIVE 04/03/2018 07:45 PM    WBCUA 2-4 04/03/2018 07:45 PM    BACTERIA NONE 04/03/2018 07:45 PM    RBCUA 5-10 04/03/2018 07:45 PM    BLOODU SMALL 04/03/2018 07:45 PM    SPECGRAV 1.014 03/07/2018 09:10 PM    GLUCOSEU NEGATIVE 04/03/2018 07:45 PM       Radiology (48 hours):  XR CHEST PORTABLE    Result Date: 10/5/2022  Small pneumothorax at the right lung base. Chest tube tip is at the right lung base, grossly unchanged. Small right pleural effusion, decreased. Right lung base consolidation, increased, suspicious for atelectasis or pneumonia. Cardiomegaly. This document has been electronically signed by: Melissa Parekh MD on 10/05/2022 03:41 AM        DVT prophylaxis:    [] Lovenox  [] SCDs  [x] SQ Heparin  [] Encourage ambulation   [] Already on Anticoagulation       Diet: ADULT DIET; Regular; 3 carb choices (45 gm/meal);  Low Sodium (2 gm)  Code Status: Full Code  PT/OT: Consulted      Electronically signed by Benny Lanza MD on 10/11/2022 at 4:47 PM    Case was discussed with Attending, Dr. Karen Rascon

## 2022-10-11 NOTE — PROGRESS NOTES
CT/CV Surgery Progress Note    10/11/2022 7:46 AM  Surgeon:  Dr. Lawana Collet     Subjective: Mr. Taylor Perdomo is resting comfortably in bed, alert, and in no acute distress. Pt denies chest pressure, SOB, fever,chills, N/V/D. He is on 1L O2 NC currently. I/O last 3 completed shifts: In: 1560 [P.O.:150;  I.V.:1010]  Out: 3650 [Blood:300; Chest Tube:950]    Vital Signs: BP (!) 113/51   Pulse 77   Temp 97.7 °F (36.5 °C) (Oral)   Resp 18   Ht 6' 3\" (1.905 m)   Wt 210 lb 3.2 oz (95.3 kg)   SpO2 95%   BMI 26.27 kg/m²    Temp (24hrs), Av.7 °F (36.5 °C), Min:97.1 °F (36.2 °C), Max:98.4 °F (36.9 °C)    Labs:   CBC:   Recent Labs     10/11/22  0336   WBC 14.2*   HGB 11.0*   HCT 36.0*   .9*        BMP:   Recent Labs     10/09/22  0425 10/10/22  0335 10/11/22  0336    135 136   K 4.8 5.2 5.7*    99 101   CO2 25 24 25   BUN 60* 77* 37*   CREATININE 9.0* 10.6* 7.2*     Imaging:  Chest X-Ray: 10/11/2022       Intake/Output Summary (Last 24 hours) at 10/11/2022 0746  Last data filed at 10/11/2022 0413  Gross per 24 hour   Intake 1560 ml   Output 3600 ml   Net -2040 ml     Scheduled Meds:    sodium zirconium cyclosilicate  10 g Oral Once    sodium chloride flush  5-40 mL IntraVENous 2 times per day    minoxidil  2.5 mg Oral BID    ketamine  100 mg IntraVENous Once    doxycycline hyclate  100 mg Oral 2 times per day    dexamethasone  0.5 mg Oral Daily    docusate sodium  100 mg Oral BID    senna  1 tablet Oral Nightly    [Held by provider] aspirin  81 mg Oral Daily    atorvastatin  40 mg Oral Nightly    cloNIDine  0.3 mg Oral TID    doxazosin  8 mg Oral Nightly    famotidine  20 mg Oral Daily    ferrous sulfate  325 mg Oral Daily with breakfast    folic acid  2,646 mcg Oral Daily    fluticasone  1 spray Nasal Daily    isosorbide mononitrate  120 mg Oral Daily    sertraline  100 mg Oral Daily    verapamil  240 mg Oral Daily    multivitamin  1 tablet Oral Daily    sodium chloride flush  10 mL IntraVENous 2 times per day     ROS: All neg unless specifically mentioned in subjective section. Exam:  General Appearance: alert ,conversing, in no acute distress  Cardiovascular: normal rate, regular rhythm, normal S1 and S2, no murmurs, rubs, clicks, or gallops  Pulmonary/Chest: clear to auscultation bilaterally- no wheezes, rales or rhonchi, normal air movement, no respiratory distress  Neurological: alert, oriented, normal speech, no focal findings or movement disorder noted  Rt posterior chest incision: Incision healing appropriately and no wound dehiscence noted.    Chest tubes in place with + air leak and 900 cc since OR    Assessment:   Patient Active Problem List   Diagnosis    FSGS (focal segmental glomerulosclerosis)    Diabetes mellitus type 2, controlled ([address]    Monoclonal (M) protein disease, multiple 'M' protein    Depression    PTSD (post-traumatic stress disorder)    Secondary renal hyperparathyroidism ([address]    Cocaine use    Substance induced mood disorder ([facility])    Renal artery stenosis ([facility]) with uncontrollable hypertension    Chronic alcoholism ([facility])    Severe cocaine use disorder ([facility])    Uncontrolled hypertension    LVH (left ventricular hypertrophy) due to hypertensive disease    Acute on chronic diastolic CHF (congestive heart failure) ([facility])    REZA (obstructive sleep apnea)    Headache, unspecified headache type    FH: HTN (hypertension)    Hypertrophic cardiomyopathy ([address]    Diet-controlled diabetes mellitus ([address]    A-V fistula ([facility])    Chronic thoracic aortic dissection    Hyperphosphatemia    Anemia in CKD (chronic kidney disease)    Vitamin D deficiency    Thrombocytopenia ([facility])    Personality disorder ([facility])    Tobacco abuse    Metabolic acidosis    Pneumonia due to organism    Precordial pain    ESRD on hemodialysis ([facility])    Edema of upper extremity    ESRD (end stage renal disease) ([address]    Hyperglycemia    Cocaine abuse, continuous - reports spending $100 on cocaine \"every other day\" Homelessness    Moderate episode of recurrent major depressive disorder [facility]    Noncompliance of patient with renal dialysis ([facility]    Anemia associated with stage 5 chronic renal failure ([facility]    Hypertensive emergency    Cocaine use disorder, severe, dependence ([facility]    Alcohol use disorder, severe, dependence ([facility]    Abdominal aortic aneurysm (AAA) without rupture    Atrial flutter with rapid ventricular response (HCC)    PVD (peripheral vascular disease) (HCC)    Dyslipidemia    Other fluid overload    Laceration of flexor muscle, fascia and tendon of left thumb at forearm level, initial encounter    Acute respiratory failure with hypoxia and hypercarbia (HCC)    Acute pulmonary edema (HCC)    Macrocytic anemia    History of aortic aneurysm repair    Medical non-compliance    Volume overload    Chest pain    Dyspnea    Pancytopenia (HCC)    Acute febrile illness    Hyponatremia    Hypermagnesemia    History of atrial flutter    History of alcohol abuse    History of cocaine abuse (HCC)    Mild tricuspid regurgitation    Uremia, acute    COVID-19 virus detected    COVID-19    Hypoxia     Plan:   CXR reviewed- Continue daily CXR's    Keep chest tubes in place with suction.   Continue current therapy    The plan of care was discussed in detail with Dr. Eva Mares     Electronically signed by Cesario Hazel PA-C on 10/11/2022 at 7:46 AM

## 2022-10-12 ENCOUNTER — APPOINTMENT (OUTPATIENT)
Dept: GENERAL RADIOLOGY | Age: 57
DRG: 163 | End: 2022-10-12
Payer: MEDICARE

## 2022-10-12 PROBLEM — J98.19 TRAPPED LUNG: Status: ACTIVE | Noted: 2022-10-12

## 2022-10-12 LAB
ANION GAP SERPL CALCULATED.3IONS-SCNC: 14 MEQ/L (ref 8–16)
BUN BLDV-MCNC: 55 MG/DL (ref 7–22)
CALCIUM IONIZED: 1.19 MMOL/L (ref 1.12–1.32)
CALCIUM SERPL-MCNC: 8.3 MG/DL (ref 8.5–10.5)
CHLORIDE BLD-SCNC: 99 MEQ/L (ref 98–111)
CO2: 23 MEQ/L (ref 23–33)
CREAT SERPL-MCNC: 10 MG/DL (ref 0.4–1.2)
ERYTHROCYTE [DISTWIDTH] IN BLOOD BY AUTOMATED COUNT: 13.2 % (ref 11.5–14.5)
ERYTHROCYTE [DISTWIDTH] IN BLOOD BY AUTOMATED COUNT: 49.7 FL (ref 35–45)
FERRITIN: 2774 NG/ML (ref 22–322)
GFR SERPL CREATININE-BSD FRML MDRD: 7 ML/MIN/1.73M2
GLUCOSE BLD-MCNC: 97 MG/DL (ref 70–108)
HCT VFR BLD CALC: 29.5 % (ref 42–52)
HEMOGLOBIN: 9 GM/DL (ref 14–18)
IRON SATURATION: 23 % (ref 20–50)
IRON: 36 UG/DL (ref 65–195)
MAGNESIUM: 2.4 MG/DL (ref 1.6–2.4)
MCH RBC QN AUTO: 31.4 PG (ref 26–33)
MCHC RBC AUTO-ENTMCNC: 30.5 GM/DL (ref 32.2–35.5)
MCV RBC AUTO: 102.8 FL (ref 80–94)
PHOSPHORUS: 4.2 MG/DL (ref 2.4–4.7)
PLATELET # BLD: 125 THOU/MM3 (ref 130–400)
PMV BLD AUTO: 9.9 FL (ref 9.4–12.4)
POTASSIUM SERPL-SCNC: 4.5 MEQ/L (ref 3.5–5.2)
RBC # BLD: 2.87 MILL/MM3 (ref 4.7–6.1)
SODIUM BLD-SCNC: 136 MEQ/L (ref 135–145)
TOTAL IRON BINDING CAPACITY: 159 UG/DL (ref 171–450)
WBC # BLD: 7.8 THOU/MM3 (ref 4.8–10.8)

## 2022-10-12 PROCEDURE — 80048 BASIC METABOLIC PNL TOTAL CA: CPT

## 2022-10-12 PROCEDURE — 2580000003 HC RX 258: Performed by: PHYSICIAN ASSISTANT

## 2022-10-12 PROCEDURE — 83540 ASSAY OF IRON: CPT

## 2022-10-12 PROCEDURE — 83735 ASSAY OF MAGNESIUM: CPT

## 2022-10-12 PROCEDURE — 82330 ASSAY OF CALCIUM: CPT

## 2022-10-12 PROCEDURE — 97535 SELF CARE MNGMENT TRAINING: CPT

## 2022-10-12 PROCEDURE — 36415 COLL VENOUS BLD VENIPUNCTURE: CPT

## 2022-10-12 PROCEDURE — 90935 HEMODIALYSIS ONE EVALUATION: CPT

## 2022-10-12 PROCEDURE — 6360000002 HC RX W HCPCS: Performed by: INTERNAL MEDICINE

## 2022-10-12 PROCEDURE — 6370000000 HC RX 637 (ALT 250 FOR IP): Performed by: INTERNAL MEDICINE

## 2022-10-12 PROCEDURE — 71045 X-RAY EXAM CHEST 1 VIEW: CPT

## 2022-10-12 PROCEDURE — 6370000000 HC RX 637 (ALT 250 FOR IP): Performed by: STUDENT IN AN ORGANIZED HEALTH CARE EDUCATION/TRAINING PROGRAM

## 2022-10-12 PROCEDURE — 83550 IRON BINDING TEST: CPT

## 2022-10-12 PROCEDURE — 85027 COMPLETE CBC AUTOMATED: CPT

## 2022-10-12 PROCEDURE — 90935 HEMODIALYSIS ONE EVALUATION: CPT | Performed by: INTERNAL MEDICINE

## 2022-10-12 PROCEDURE — 6370000000 HC RX 637 (ALT 250 FOR IP): Performed by: NURSE PRACTITIONER

## 2022-10-12 PROCEDURE — 99233 SBSQ HOSP IP/OBS HIGH 50: CPT | Performed by: INTERNAL MEDICINE

## 2022-10-12 PROCEDURE — 6360000002 HC RX W HCPCS: Performed by: PHYSICIAN ASSISTANT

## 2022-10-12 PROCEDURE — 6370000000 HC RX 637 (ALT 250 FOR IP): Performed by: PHYSICIAN ASSISTANT

## 2022-10-12 PROCEDURE — 97530 THERAPEUTIC ACTIVITIES: CPT

## 2022-10-12 PROCEDURE — 84100 ASSAY OF PHOSPHORUS: CPT

## 2022-10-12 PROCEDURE — 2060000000 HC ICU INTERMEDIATE R&B

## 2022-10-12 PROCEDURE — 6370000000 HC RX 637 (ALT 250 FOR IP)

## 2022-10-12 PROCEDURE — 82728 ASSAY OF FERRITIN: CPT

## 2022-10-12 RX ORDER — DOXAZOSIN MESYLATE 4 MG/1
4 TABLET ORAL EVERY 12 HOURS SCHEDULED
Status: DISCONTINUED | OUTPATIENT
Start: 2022-10-12 | End: 2022-10-15 | Stop reason: HOSPADM

## 2022-10-12 RX ORDER — ISOSORBIDE MONONITRATE 60 MG/1
60 TABLET, EXTENDED RELEASE ORAL 2 TIMES DAILY
Status: DISCONTINUED | OUTPATIENT
Start: 2022-10-12 | End: 2022-10-15 | Stop reason: HOSPADM

## 2022-10-12 RX ADMIN — DOCUSATE SODIUM 100 MG: 100 CAPSULE, LIQUID FILLED ORAL at 14:14

## 2022-10-12 RX ADMIN — EPOETIN ALFA-EPBX 2000 UNITS: 2000 INJECTION, SOLUTION INTRAVENOUS; SUBCUTANEOUS at 15:43

## 2022-10-12 RX ADMIN — CLONIDINE HYDROCHLORIDE 0.3 MG: 0.2 TABLET ORAL at 22:14

## 2022-10-12 RX ADMIN — OXYCODONE AND ACETAMINOPHEN 2 TABLET: 5; 325 TABLET ORAL at 00:54

## 2022-10-12 RX ADMIN — DEXAMETHASONE 0.5 MG: 0.5 TABLET ORAL at 14:15

## 2022-10-12 RX ADMIN — SODIUM CHLORIDE, PRESERVATIVE FREE 10 ML: 5 INJECTION INTRAVENOUS at 22:17

## 2022-10-12 RX ADMIN — DOXYCYCLINE HYCLATE 100 MG: 100 TABLET, COATED ORAL at 14:19

## 2022-10-12 RX ADMIN — MORPHINE SULFATE 1 MG: 2 INJECTION, SOLUTION INTRAMUSCULAR; INTRAVENOUS at 22:14

## 2022-10-12 RX ADMIN — EPOETIN ALFA-EPBX 2000 UNITS: 2000 INJECTION, SOLUTION INTRAVENOUS; SUBCUTANEOUS at 15:42

## 2022-10-12 RX ADMIN — FLUTICASONE PROPIONATE 1 SPRAY: 50 SPRAY, METERED NASAL at 14:13

## 2022-10-12 RX ADMIN — SERTRALINE 100 MG: 100 TABLET, FILM COATED ORAL at 14:19

## 2022-10-12 RX ADMIN — SENNOSIDES 8.6 MG: 8.6 TABLET, COATED ORAL at 22:14

## 2022-10-12 RX ADMIN — CLONIDINE HYDROCHLORIDE 0.3 MG: 0.2 TABLET ORAL at 14:14

## 2022-10-12 RX ADMIN — ISOSORBIDE MONONITRATE 60 MG: 60 TABLET, EXTENDED RELEASE ORAL at 22:14

## 2022-10-12 RX ADMIN — FERROUS SULFATE TAB 325 MG (65 MG ELEMENTAL FE) 325 MG: 325 (65 FE) TAB at 14:19

## 2022-10-12 RX ADMIN — SODIUM CHLORIDE, PRESERVATIVE FREE 10 ML: 5 INJECTION INTRAVENOUS at 22:16

## 2022-10-12 RX ADMIN — FOLIC ACID 1000 MCG: 1 TABLET ORAL at 14:19

## 2022-10-12 RX ADMIN — Medication 1 TABLET: at 14:19

## 2022-10-12 RX ADMIN — FAMOTIDINE 20 MG: 20 TABLET ORAL at 14:19

## 2022-10-12 RX ADMIN — OXYCODONE AND ACETAMINOPHEN 2 TABLET: 5; 325 TABLET ORAL at 07:52

## 2022-10-12 RX ADMIN — OXYCODONE AND ACETAMINOPHEN 2 TABLET: 5; 325 TABLET ORAL at 19:50

## 2022-10-12 RX ADMIN — ATORVASTATIN CALCIUM 40 MG: 40 TABLET, FILM COATED ORAL at 22:14

## 2022-10-12 RX ADMIN — MORPHINE SULFATE 2 MG: 2 INJECTION, SOLUTION INTRAMUSCULAR; INTRAVENOUS at 14:13

## 2022-10-12 RX ADMIN — OXYCODONE AND ACETAMINOPHEN 2 TABLET: 5; 325 TABLET ORAL at 15:43

## 2022-10-12 RX ADMIN — DOCUSATE SODIUM 100 MG: 100 CAPSULE, LIQUID FILLED ORAL at 22:14

## 2022-10-12 RX ADMIN — MINOXIDIL 2.5 MG: 2.5 TABLET ORAL at 22:14

## 2022-10-12 ASSESSMENT — PAIN SCALES - GENERAL
PAINLEVEL_OUTOF10: 6
PAINLEVEL_OUTOF10: 9
PAINLEVEL_OUTOF10: 7
PAINLEVEL_OUTOF10: 8
PAINLEVEL_OUTOF10: 8
PAINLEVEL_OUTOF10: 3

## 2022-10-12 ASSESSMENT — PAIN - FUNCTIONAL ASSESSMENT
PAIN_FUNCTIONAL_ASSESSMENT: ACTIVITIES ARE NOT PREVENTED

## 2022-10-12 ASSESSMENT — PAIN DESCRIPTION - LOCATION
LOCATION: RIB CAGE

## 2022-10-12 ASSESSMENT — PAIN DESCRIPTION - DESCRIPTORS
DESCRIPTORS: ACHING

## 2022-10-12 ASSESSMENT — PAIN DESCRIPTION - ORIENTATION
ORIENTATION: RIGHT

## 2022-10-12 NOTE — PROGRESS NOTES
[facility]    Patient - Griselda Bruno,  Age - 56 y.o.    - 1965      Room Number - 4K-14/014-A   N -  [id number]   [facility]  Date of Admission -  2022  9:56 AM  Hospital Day - 14  Chief Complaint   Shortness of breath  HPI   Griselda Bruno is a 56 y.o. male with PMHx of AAA (with repair), ESRD on HD, NIDDM2, hyperparathyroidism with worsening shortness of breath at rest and with exertion. Chest x-ray demonstrated large right pleural effusion and the patient required BiPAP therapy for an adequate oxygenation. On admission a right-sided chest tube was placed with 2 L of serosanguineous fluid drainage and pleural fluid analysis consistent with exudative process and negative cytology. Repeat CTA of the chest demonstrated improvement of the right-sided pleural effusion with evidence of right apical pneumothorax. The patient underwent right-sided chemical neurolysis on 10/4/2022, bronchoscopy on  without complication, and right-sided thoracotomy with decortication and wedge resection on  with Dr. Eva Mares.    The patient has been also undergoing hemodialysis 3 times weekly without complication for his ESRD. Past 24 hours   Per nursing, no acute events overnight. Patient went for dialysis today and tolerated well. Patient was sitting in the bedside chair in no visibile distress and has no complaints. All other systems reviewed   Objective    Vitals   height is 6' 3\" (1.905 m) and weight is 202 lb 13.2 oz (92 kg). His temperature is 97 °F (36.1 °C). His blood pressure is 141/67 (abnormal) and his pulse is 88. His respiration is 16 and oxygen saturation is 90%. Body mass index is 25.35 kg/m². O2 Flow Rate (L/min): 5 L/min  I/O    Intake/Output Summary (Last 24 hours) at 10/12/2022 0909  Last data filed at 10/12/2022 0428  Gross per 24 hour   Intake 640 ml   Output 503 ml   Net 137 ml     I/O last 3 completed shifts:   In: 800 [P.O.:790; I.V.:10]  Out: 1403 [Chest Tube:1403]   Patient Vitals for the past 96 hrs (Last 3 readings):   Weight   10/12/22 0735 202 lb 13.2 oz (92 kg)   10/12/22 0428 216 lb (98 kg)   10/11/22 0413 210 lb 3.2 oz (95.3 kg)     Exam    General Appearance - awake, alert, oriented, in mild distress  HEENT - normal  Neck - supple, symmetrical, trachea midline   Lungs - positive findings: wheezing L upper anterior, L upper posterior, R upper anterior, and R upper posterior  Chest - right-sided chest tubes in place x3  Cardiovascular - regular rhythm without murmur, gallop or rub. Abdomen - soft, nontender, nondistended, no masses or organomegaly  Neurologic - no focal motor or sensory deficits  Skin - no bruising or bleeding.  Right posterior thorax surgical incision  Extremities - no cyanosis, clubbing or edema  Meds       epoetin qiana-epbx  2,000 Units SubCUTAneous Once per day on Mon Wed Fri    And    epoetin qiana-epbx  2,000 Units SubCUTAneous Once per day on Mon Wed Fri    sodium chloride flush  5-40 mL IntraVENous 2 times per day    minoxidil  2.5 mg Oral BID    ketamine  100 mg IntraVENous Once    doxycycline hyclate  100 mg Oral 2 times per day    dexamethasone  0.5 mg Oral Daily    docusate sodium  100 mg Oral BID    senna  1 tablet Oral Nightly    [Held by provider] aspirin  81 mg Oral Daily    atorvastatin  40 mg Oral Nightly    cloNIDine  0.3 mg Oral TID    doxazosin  8 mg Oral Nightly    famotidine  20 mg Oral Daily    ferrous sulfate  325 mg Oral Daily with breakfast    folic acid  1,280 mcg Oral Daily    fluticasone  1 spray Nasal Daily    isosorbide mononitrate  120 mg Oral Daily    sertraline  100 mg Oral Daily    verapamil  240 mg Oral Daily    multivitamin  1 tablet Oral Daily    sodium chloride flush  10 mL IntraVENous 2 times per day      sodium chloride      sodium chloride       sodium chloride flush, sodium chloride, nicotine polacrilex, oxyCODONE-acetaminophen **OR** oxyCODONE-acetaminophen, morphine **OR** morphine, promethazine **OR** ondansetron, albuterol sulfate HFA, hydrOXYzine HCl, traZODone, sodium chloride flush, sodium chloride, ondansetron **OR** [DISCONTINUED] ondansetron, polyethylene glycol, acetaminophen, morphine  Lab   ABG  Lab Results   Component Value Date/Time    PH 7.27 09/28/2022 03:20 PM    PO2 101 09/28/2022 03:20 PM    PCO2 64 09/28/2022 03:20 PM    HCO3 29 09/28/2022 03:20 PM    O2SAT 97 09/28/2022 03:20 PM     Lab Results   Component Value Date/Time    IFIO2 40 09/28/2022 03:20 PM    MODE CPAP 03/31/2016 06:54 AM    SETPEEP 6.0 09/28/2022 03:20 PM     CBC  Recent Labs     10/11/22  0336 10/12/22  0451   WBC 14.2* 7.8   RBC 3.50* 2.87*   HGB 11.0* 9.0*   HCT 36.0* 29.5*   .9* 102.8*   MCH 31.4 31.4   MCHC 30.6* 30.5*    125*   MPV 9.4 9.9      BMP  Recent Labs     10/10/22  0335 10/11/22  0336 10/11/22  1129 10/11/22  1811 10/12/22  0451    136  --   --  136   K 5.2 5.7* 5.9* 4.9 4.5   CL 99 101  --   --  99   CO2 24 25  --   --  23   BUN 77* 37*  --   --  55*   CREATININE 10.6* 7.2*  --   --  10.0*   GLUCOSE 93 135*  --   --  97   MG  --   --   --   --  2.4   PHOS  --   --   --   --  4.2   CALCIUM 9.3 9.0  --   --  8.3*     LFT  No results for input(s): AST, ALT, ALB, BILITOT, ALKPHOS, LIPASE in the last 72 hours. Invalid input(s): AMYLASE  TROP  Lab Results   Component Value Date/Time    TROPONINT 0.054 09/28/2022 10:00 AM    TROPONINT 0.074 09/02/2020 03:30 AM    TROPONINT 0.059 07/27/2020 03:00 PM     BNP  No results for input(s): BNP in the last 72 hours. Lactic Acid  No results for input(s): LACTA in the last 72 hours. INR  No results for input(s): INR, PROTIME in the last 72 hours. PTT  No results for input(s): APTT in the last 72 hours. Glucose  No results for input(s): POCGLU in the last 72 hours.   UA No results for input(s): SPECGRAV, [address], [address], [facility], [address] [facility] [place] [address],  Violet Thompson, JESSI, YASU, Cherelle [facility], [facility], UROBILINOGEN, KETUA, LABCAST, LABCASTTY, AMORPHOS in the last 72 hours. Invalid input(s): CRYSTALS. EKG   10/1/22  alternating sinus and junctional rhythm with occasional PVCs    Left axis deviation  LVH ( Sokolow-Marte , [place] product , Romhilt-Rueda )  Inferior infarct (cited on or before 28-SEP-2022)  Abnormal ECG  When compared with ECG of 28-SEP-2022 10:07,  Significant changes have occurred  Confirmed by Thania Luevano ([id number]) on 10/2/2022 1:04:21 PM  Echo   9/29/22  Summary   Left ventricle size is normal.   Severely increased left ventricle wall thickness. Severe concentric left ventricular hypertrophy. Systolic function was normal.   Ejection fraction is visually estimated at 60%. The left atrium is Moderately dilated. Moderately enlarged right atrium size. Mild systolic anterior motion (CASSIE) of anterior leaflet. Moderate LVOT Obstruction   Moderately elevated left ventricular outflow tract velocity 3 m/sec . Moderately elevated Peak left ventricular outflow tract gradient of 36 mmhg . Signature      ----------------------------------------------------------------   Electronically signed by Adriana Mcgowan MD (Interpreting   physician) on 09/29/2022 at 06:56 PM   ----------------------------------------------------------------  Cultures   Procalcitonin   Lab Results   Component Value Date/Time    PROCAL 0.27 07/06/2020 08:22 PM    PROCAL 0.38 03/06/2018 10:46 PM    PROCAL 1.47 09/28/2017 06:18 PM     Blood Cx (9/28/22) - NGTD  Pleural Fluid Fungus (9/29/22) - No fungus or yeast observed  Pleural fluid culture (9/28/22) - Rare segmented neutrophils observed  Molecular Pneumonia Panel (10/6/22) - No organisms detected  Respiratory Culture (10/6/22) - Moderate growth Citrobacter koseri  AFB culture and smear (10/10/22) - No growth for 10 days  Anaerobic and Aerobic lung fluid (10/10/22) - rare segmented neutrophils observed.     Radiology   CXR        Impression: Small residual right pneumothorax, decreased since the prior study. Bilateral pleural effusions. Right basilar airspace consolidation. Findings similar to previous study. CT Scans  (See actual reports for details)        [address] Problems    Diagnosis Date Noted    Hypoxia [R09.02] 09/28/2022     Priority: Medium    Acute respiratory failure with hypoxia and hypercarbia (HCC) [J96.01, J96.02]      Assessment and Plan   Right pleural effusion - Multifactorial. S/p chemical pneumolysis on 10/4/22, bronchoscopy on 10/6/22. Exudative pleural studies with negative cytology for malignancy. - CT surgery managing. Trapped right lung - S/p chemical pneumolysis on 10/4/22, bronchoscopy on 10/6/22 and POD #2 from right thoracotomy with decortication and wedge resection. 3 large bore right chest tube in place with approximately 500mL total output in the last 24 hours without air leak. - Continue daily chest x-ray keep chest tube to wall suction of -20 cmH20.  - Follow surgical pathology    Right apical pneumothorax - CXR 10/12/2022 persistent apical pneumothorax. Decreased since prior study. - Continue chest tube and management as above. Right lower lobe infiltrative disease - Exudative pleural effusion. Patient underwent chest tube placement 9/28/22 followed bychemical pneumolysis on 10/4/22, bronchoscopy on 10/6/22 and thoracotomy with decortication and wedge resection on 10/10/22. Respiratory culture (+) moderate growth Citrobacter koseri.  - Discontinue Doxycycline  - Start Cefepime 1g IV every 24hours for 7 days- Adjusted to creatinine clearance. - Continue Decadron  - incentive spirometry and bronchodilators    Acute hypoxic respiratory failure - Secondary to above. Patient does not use supplemental oxygen at home.  Now requiring 5Lpm via NC.  - Wean supplemental oxygen as tolerated to maintain SPO2>90%    REZA - No sleep study found per chart review  - Continue BiPAP at night and naps  - Continue Albuterol as needed  - Will schedule  for nocturnal polysomnogram (Sleep study) with baseline protocol at [facility] sleep lab after discharge from the current hospitalization. Patient to follow with  sleep clinic at [address], [phone] for Pulmonary disease in 1 week after the sleep study to go over the study reports and further management options. ESRD on HD - M,W,F dialysis  - Nephrology to continue management    Compensated systolic heart failure NYHA II - ECHO 9/29/22 EF=60% with increased LV wall thickness.  - Strict I/O  - Continue Imdur    Hx of AAA - s/p aortic stent graft 7/2018      Case discussed with nurse and patient  Questions and concerns addressed. Meds and orders reviewed. Electronically signed by     Shania Avila DO on 10/12/2022 at 9:09 AM    Addendum by Dr. Jaxson Castro MD:  Patient seen by me independently including key components of medical care. Face to face evaluation and examination was performed. Case discussed with Heraclio Adam DO, MBA -resident physician. Italicized font, if present,  represents changes to the note made by me. More than 50% of the encounter time involved with patient care by the Pulmonary & Critical care service team spent by me.     Please see my modifications mentioned below:  Bronch cultures dated 6 October 2022        Electronically signed by   Marty Chavez MD on 10/12/2022 at 7:45 PM

## 2022-10-12 NOTE — PLAN OF CARE
Problem: Discharge Planning  Goal: Discharge to home or other facility with appropriate resources  10/12/2022 0310 by Yasmine Hogue RN  Outcome: Progressing  Flowsheets (Taken 10/12/2022 0310)  Discharge to home or other facility with appropriate resources:   Identify barriers to discharge with patient and caregiver   Arrange for needed discharge resources and transportation as appropriate   Identify discharge learning needs (meds, wound care, etc)   Refer to discharge planning if patient needs post-hospital services based on physician order or complex needs related to functional status, cognitive ability or social support system     Problem: Pain  Goal: Verbalizes/displays adequate comfort level or baseline comfort level  10/12/2022 0310 by Yasmine Hogue RN  Outcome: Progressing  Flowsheets (Taken 10/12/2022 0310)  Verbalizes/displays adequate comfort level or baseline comfort level:   Encourage patient to monitor pain and request assistance   Assess pain using appropriate pain scale   Administer analgesics based on type and severity of pain and evaluate response   Implement non-pharmacological measures as appropriate and evaluate response   Notify Licensed Independent Practitioner if interventions unsuccessful or patient reports new pain     Problem: Safety - Adult  Goal: Free from fall injury  10/12/2022 0310 by Yasmine Hogue RN  Outcome: Progressing  Flowsheets (Taken 10/12/2022 0310)  Free From Fall Injury: Instruct family/caregiver on patient safety     Problem: ABCDS Injury Assessment  Goal: Absence of physical injury  10/12/2022 0310 by Yasmine Hogue RN  Outcome: Progressing  Flowsheets (Taken 10/12/2022 0310)  Absence of Physical Injury: Implement safety measures based on patient assessment     Problem: Respiratory - Adult  Goal: Achieves optimal ventilation and oxygenation  10/12/2022 0310 by Yasmine Hogue RN  Outcome: Progressing  Flowsheets (Taken 10/12/2022 0310)  Achieves optimal and surrounding tissue     Problem: Skin/Tissue Integrity - Adult  Goal: Oral mucous membranes remain intact  Outcome: Progressing  Flowsheets (Taken 10/12/2022 0310)  Oral Mucous Membranes Remain Intact:   Assess oral mucosa and hygiene practices   Implement preventative oral hygiene regimen     Problem: Infection - Adult  Goal: Absence of infection at discharge  10/12/2022 0310 by Zulma Saab RN  Outcome: Progressing  Flowsheets (Taken 10/12/2022 0310)  Absence of infection at discharge:   Assess and monitor for signs and symptoms of infection   Monitor lab/diagnostic results   Monitor all insertion sites i.e., indwelling lines, tubes and drains   Laverne appropriate cooling/warming therapies per order     Problem: Infection - Adult  Goal: Absence of infection during hospitalization  Outcome: Progressing  Flowsheets (Taken 10/12/2022 0310)  Absence of infection during hospitalization:   Assess and monitor for signs and symptoms of infection   Monitor lab/diagnostic results   Monitor all insertion sites i.e., indwelling lines, tubes and drains     Problem: Infection - Adult  Goal: Absence of fever/infection during anticipated neutropenic period  Outcome: Progressing     Problem: Metabolic/Fluid and Electrolytes - Adult  Goal: Electrolytes maintained within normal limits  10/12/2022 0310 by Zulma Saab RN  Outcome: Progressing  Flowsheets (Taken 10/12/2022 0310)  Electrolytes maintained within normal limits:   Monitor labs and assess patient for signs and symptoms of electrolyte imbalances   Administer electrolyte replacement as ordered   Monitor response to electrolyte replacements, including repeat lab results as appropriate     Problem: Metabolic/Fluid and Electrolytes - Adult  Goal: Hemodynamic stability and optimal renal function maintained  Outcome: Progressing  Flowsheets (Taken 10/12/2022 0310)  Hemodynamic stability and optimal renal function maintained:   Monitor labs and assess for signs and symptoms of volume excess or deficit   Monitor intake, output and patient weight   Monitor urine specific gravity, serum osmolarity and serum sodium as indicated or ordered   Monitor response to interventions for patient's volume status, including labs, urine output, blood pressure (other measures as available)     Problem: Metabolic/Fluid and Electrolytes - Adult  Goal: Glucose maintained within prescribed range  Outcome: Progressing  Flowsheets (Taken 10/12/2022 0310)  Glucose maintained within prescribed range:   Monitor blood glucose as ordered   Assess for signs and symptoms of hyperglycemia and hypoglycemia     Problem: Hematologic - Adult  Goal: Maintains hematologic stability  10/12/2022 0310 by Keyana Huerta RN  Outcome: Progressing  Flowsheets (Taken 10/12/2022 0310)  Maintains hematologic stability: Assess for signs and symptoms of bleeding or hemorrhage     Problem: Skin/Tissue Integrity  Goal: Absence of new skin breakdown  Description: 1. Monitor for areas of redness and/or skin breakdown  2. Assess vascular access sites hourly  3. Every 4-6 hours minimum:  Change oxygen saturation probe site  4. Every 4-6 hours:  If on nasal continuous positive airway pressure, respiratory therapy assess nares and determine need for appliance change or resting period. Outcome: Progressing  Note: No new skin lesions noted this shift. Patient encouraged to reposition every two hours. Skin assessments completed and ongoing.         Problem: Chronic Conditions and Co-morbidities  Goal: Patient's chronic conditions and co-morbidity symptoms are monitored and maintained or improved  10/12/2022 0310 by Keyana Huerta RN  Outcome: Progressing  Flowsheets (Taken 10/12/2022 0310)  Care Plan - Patient's Chronic Conditions and Co-Morbidity Symptoms are Monitored and Maintained or Improved: Monitor and assess patient's chronic conditions and comorbid symptoms for stability, deterioration, or improvement     Problem: Nutrition Deficit:  Goal: Optimize nutritional status  Outcome: Progressing  Flowsheets (Taken 10/12/2022 0310)  Nutrient intake appropriate for improving, restoring, or maintaining nutritional needs:   Assess nutritional status and recommend course of action   Monitor oral intake, labs, and treatment plans     Problem: Neurosensory - Adult  Goal: Achieves maximal functionality and self care  Outcome: Progressing  Flowsheets (Taken 10/12/2022 0310)  Achieves maximal functionality and self care:   Monitor swallowing and airway patency with patient fatigue and changes in neurological status   Encourage and assist patient to increase activity and self care with guidance from physical therapy/occupational therapy     Problem: Neurosensory - Adult  Goal: Achieves stable or improved neurological status  Outcome: Progressing  Flowsheets (Taken 10/12/2022 0310)  Achieves stable or improved neurological status:   Assess for and report changes in neurological status   Initiate measures to prevent increased intracranial pressure   Maintain blood pressure and fluid volume within ordered parameters to optimize cerebral perfusion and minimize risk of hemorrhage   Monitor temperature, glucose, and sodium.  Initiate appropriate interventions as ordered     Problem: Musculoskeletal - Adult  Goal: Return mobility to safest level of function  10/12/2022 0310 by Eneida Campos RN  Outcome: Progressing  Flowsheets (Taken 10/12/2022 0310)  Return Mobility to Safest Level of Function:   Assess patient stability and activity tolerance for standing, transferring and ambulating with or without assistive devices   Assist with transfers and ambulation using safe patient handling equipment as needed   Ensure adequate protection for wounds/incisions during mobilization   Obtain physical therapy/occupational therapy consults as needed     Problem: Musculoskeletal - Adult  Goal: Maintain proper alignment of affected body part  Outcome: Progressing  Flowsheets (Taken 10/12/2022 0310)  Maintain proper alignment of affected body part:   Support and protect limb and body alignment per provider's orders   Instruct and reinforce with patient and family use of appropriate assistive device and precautions (e.g. spinal or hip dislocation precautions)     Problem: Musculoskeletal - Adult  Goal: Return ADL status to a safe level of function  Outcome: Progressing  Flowsheets (Taken 10/12/2022 0310)  Return ADL Status to a Safe Level of Function:   Administer medication as ordered   Assess activities of daily living deficits and provide assistive devices as needed   Obtain physical therapy/occupational therapy consults as needed     Problem: Gastrointestinal - Adult  Goal: Minimal or absence of nausea and vomiting  10/12/2022 0310 by Oscar Brooks RN  Outcome: Progressing  Flowsheets (Taken 10/12/2022 0310)  Minimal or absence of nausea and vomiting:   Administer IV fluids as ordered to ensure adequate hydration   Provide nonpharmacologic comfort measures as appropriate     Problem: Gastrointestinal - Adult  Goal: Maintains or returns to baseline bowel function  Outcome: Progressing  Flowsheets (Taken 10/12/2022 0310)  Maintains or returns to baseline bowel function:   Assess bowel function   Administer ordered medications as needed   Encourage oral fluids to ensure adequate hydration     Problem: Gastrointestinal - Adult  Goal: Maintains adequate nutritional intake  Outcome: Progressing  Flowsheets (Taken 10/12/2022 0310)  Maintains adequate nutritional intake:   Monitor percentage of each meal consumed   Monitor intake and output, weight and lab values     Problem: Genitourinary - Adult  Goal: Absence of urinary retention  10/12/2022 0310 by Oscar Brooks, RN  Outcome: Progressing  Flowsheets (Taken 10/12/2022 0310)  Absence of urinary retention:   Assess patients ability to void and empty bladder   Monitor intake/output and perform bladder scan as needed     Problem: Cardiovascular - Adult  Goal: Absence of cardiac dysrhythmias or at baseline  Recent Flowsheet Documentation  Taken 10/12/2022 0310 by Neida Flores RN  Absence of cardiac dysrhythmias or at baseline: Monitor cardiac rate and rhythm     Problem: Infection - Adult  Goal: Absence of infection during hospitalization  Recent Flowsheet Documentation  Taken 10/12/2022 0310 by Neida Flores RN  Absence of infection during hospitalization:   Assess and monitor for signs and symptoms of infection   Monitor lab/diagnostic results   Monitor all insertion sites i.e., indwelling lines, tubes and drains

## 2022-10-12 NOTE — FLOWSHEET NOTE
10/12/22 0735 10/12/22 1205   Vital Signs   BP (!) 141/67 138/66   Temp 97 °F (36.1 °C) 97.1 °F (36.2 °C)   Heart Rate 88 96   Resp 16 18   SpO2 90 %  --    Weight 202 lb 13.2 oz (92 kg) 198 lb 6.6 oz (90 kg)   Weight Method Bed scale Bed scale   Percent Weight Change -6.1 -2.17   Post-Hemodialysis Assessment   Post-Treatment Procedures  --  Blood returned; Access bleeding time < 10 minutes   Machine Disinfection Process  --  Acid/Vinegar Clean;Heat Disinfect; Exterior Machine Disinfection   Rinseback Volume (ml)  --  400 ml   Blood Volume Processed (Liters)  --  91.9 l/min   Dialyzer Clearance  --  Lightly streaked   Duration of Treatment (minutes)  --  240 minutes   Heparin Amount Administered During Treatment (mL)  --  0 mL   Hemodialysis Intake (ml)  --  400 ml   Hemodialysis Output (ml)  --  2400 ml   NET Removed (ml)  --  2000   Stable 4 hour treatment complete. Removed 2 liters of fluid as per order. Tolerated fluid removal well. Pressure held to needle sites times ten minutes each. Dressing clean, dry and intact. Report given to primary RN. Treatment record printed for scanning into EMR.

## 2022-10-12 NOTE — PLAN OF CARE
Problem: Respiratory - Adult  Goal: Achieves optimal ventilation and oxygenation  10/12/2022 0456 by Analy Cisneros RCP  Outcome: Progressing   Mutually agreed upon goals.

## 2022-10-12 NOTE — PROGRESS NOTES
Pharmacy Renal Adjustment    Korin Reed is a 56 y.o. male. Pharmacy renally adjust the following medications per P&T approved policy: cefepime    Height:   Ht Readings from Last 1 Encounters:   10/12/22 6' 3\" (1.905 m)     Weight:  Wt Readings from Last 1 Encounters:   10/12/22 198 lb 6.6 oz (90 kg)     Recent Labs     10/11/22  0336 10/12/22  0451   BUN 37* 55*   CREATININE 7.2* 10.0*     Estimated Creatinine Clearance: 10 mL/min (A) (based on SCr of 10 mg/dL (HH)).       Assessment:  ESRD on HD    Plan:   Decrease cefepime from 2000 mg IV q8h to 1000 mg IV q24h

## 2022-10-12 NOTE — PROGRESS NOTES
[facility] ICU STEPDOWN TELEMETRY 4K  Occupational Therapy  Daily Note  Time:   Time In: 1320  Time Out: 1345  Timed Code Treatment Minutes: 25 Minutes  Minutes: 25          Date: 10/12/2022  Patient Name: Brendan Lozada,   Gender: male      Room: -14/014-A  MRN: [id number]  : 1965  (56 y.o.)  Referring Practitioner: Dr. Austen Taylor MD  Diagnosis: Hypoxia  Additional Pertinent Hx: Pt presented to [address] [place] [address] on 2022 with worsening shortness of breath at rest and with exertion. CXR showed large right pleural effusion and patient required PAP therapy for adequate oxygenation. Patient was admitted and chest tube placed on  with 2 L of serosanguineous fluid drainage. Dialysis also performed at  with 2 L of fluid. Repeat dialysis performed on  with albumin administration. Repeat dialysis performed on 2022. Pleural fluid analysis was consistent with exudative effusion with testing for malignancy pending. Suction for chest tube was discontinued on 10/01. Restrictions/Precautions:  Restrictions/Precautions: Fall Risk  Position Activity Restriction  Other position/activity restrictions: R chest tubes (10/10), Montior BP     SUBJECTIVE: Pt sitting at the EOB upon arrival, pt agreeable to KELBY fox RN gave verbal approval for session     PAIN: 6/10: RUE with RN aware     Vitals: Nurse checked vitals prior to session  Oxygen: 92% on RA    COGNITION: Slow Processing, Decreased Insight, and Impaired Memory    ADL:   Feeding: Minimal Assistance. With opening packages due to increased pain with RUE from chest tube . BALANCE:  Standing Balance: Stand By Assistance. With RW and BUE support on the walker due to pt having chest tubes, and increased pain     BED MOBILITY:  Not Tested    TRANSFERS:  Sit to Stand:  Stand By Assistance. From EOB  Stand to Sit: Stand By Assistance.  To the recliner    FUNCTIONAL MOBILITY:  Assistive Device: Rolling Walker  Assist Level:  Contact Guard Assistance. Distance:  HH distances with vc's for PLB    ASSESSMENT:     Activity Tolerance:  Patient tolerance of  treatment: good. Discharge Recommendations: 24 hour assistance or supervision  Equipment Recommendations: Equipment Needed: No  Plan: Times Per Week: 5x  Times Per Day: Once a day  Specific Instructions for Next Treatment: Functional mobility; ADLs and energy conservation techniques; UE exercises with steady breathing. Current Treatment Recommendations: Strengthening, Balance training, Functional mobility training, Endurance training, Safety education & training, Self-Care / ADL  Additional Comments: Pt would benefit from continued skilled OT services when medically stable and discharged from Acute. Patient Education  Patient Education: ADL's    Goals  Short Term Goals  Time Frame for Short Term Goals: By discharge  Short Term Goal 1: Pt will demonstrate functional mobility walking to/from the bathroom or in the kitchenette while using any AD needed with SBA and verbal cues for management of the O2 line to prepare for doing self care at home and move about safely. Short Term Goal 2: Pt will complete a spongebath or shower with SBA while following energy conservation techniques including planning and pacing to increase his activity tolerance for ease of doing his mourning routine. Short Term Goal 3: Pt will complete BUE ROM/high resistance exercises while using proper breathing technique to increase his strength and endurance for ease of doing ADLs or going in/out of his home. Short Term Goal 4: Pt will complete toileting routine including transfers to/from the standard toilet seat with supervision to increase his independence with self care. Additional Goals?: No    Following session, patient left in safe position with all fall risk precautions in place.

## 2022-10-12 NOTE — ANESTHESIA POSTPROCEDURE EVALUATION
Department of Anesthesiology  Postprocedure Note    Patient: Korin Reed  MRN: [id number]  YOB: 1965  Date of evaluation: 10/12/2022      Procedure Summary     Date: 10/10/22 [facility]    Anesthesia Start: 8125 Anesthesia Stop: Severo Peels    Procedure: Right Thoracotomy & Decortication with Partial Lung Resection (Right: Chest) Diagnosis:       Hypoxia      Trapped lung      (Hypoxia [R09.02])      (Trapped lung [J98.19])    Surgeons: Cristopher Lebron MD Responsible Provider: Ute Mcghee MD    Anesthesia Type: general ASA Status: 4          Anesthesia Type: No value filed.     Tiara Phase I: Tiara Score: 9    Tiara Phase II: Tiara Score: 10      Anesthesia Post Evaluation

## 2022-10-12 NOTE — PLAN OF CARE
Problem: Discharge Planning  Goal: Discharge to home or other facility with appropriate resources  10/12/2022 1802 by Tono Galdamez RN  Outcome: Progressing  Flowsheets (Taken 10/12/2022 1400)  Discharge to home or other facility with appropriate resources:   Identify barriers to discharge with patient and caregiver   Arrange for needed discharge resources and transportation as appropriate   Identify discharge learning needs (meds, wound care, etc)   Refer to discharge planning if patient needs post-hospital services based on physician order or complex needs related to functional status, cognitive ability or social support system  10/12/2022 1748 by Tono Galdamez RN  Outcome: Progressing  Flowsheets (Taken 10/12/2022 1400)  Discharge to home or other facility with appropriate resources:   Identify barriers to discharge with patient and caregiver   Arrange for needed discharge resources and transportation as appropriate   Identify discharge learning needs (meds, wound care, etc)   Refer to discharge planning if patient needs post-hospital services based on physician order or complex needs related to functional status, cognitive ability or social support system     Problem: Pain  Goal: Verbalizes/displays adequate comfort level or baseline comfort level  10/12/2022 1802 by Tono Galdamez RN  Outcome: Progressing  Flowsheets (Taken 10/12/2022 1630)  Verbalizes/displays adequate comfort level or baseline comfort level:   Encourage patient to monitor pain and request assistance   Assess pain using appropriate pain scale   Administer analgesics based on type and severity of pain and evaluate response   Consider cultural and social influences on pain and pain management  10/12/2022 1748 by Tono Galdamez RN  Outcome: Progressing  Flowsheets (Taken 10/12/2022 1630)  Verbalizes/displays adequate comfort level or baseline comfort level:   Encourage patient to monitor pain and request assistance Assess pain using appropriate pain scale   Administer analgesics based on type and severity of pain and evaluate response   Consider cultural and social influences on pain and pain management     Problem: Safety - Adult  Goal: Free from fall injury  10/12/2022 1802 by Marlen Claros RN  Outcome: Progressing  Flowsheets (Taken 10/12/2022 1630)  Free From Fall Injury:   Instruct family/caregiver on patient safety   Based on caregiver fall risk screen, instruct family/caregiver to ask for assistance with transferring infant if caregiver noted to have fall risk factors  10/12/2022 1748 by Marlen Claros RN  Outcome: Progressing  [address] (Taken 10/12/2022 1630)  Free From Fall Injury:   Instruct family/caregiver on patient safety   Based on caregiver fall risk screen, instruct family/caregiver to ask for assistance with transferring infant if caregiver noted to have fall risk factors     Problem: ABCDS Injury Assessment  Goal: Absence of physical injury  10/12/2022 1802 by Marlen Claros RN  Outcome: Progressing  [address] (Taken 10/12/2022 1630)  Absence of Physical Injury: Implement safety measures based on patient assessment  10/12/2022 1748 by Marlen Claros RN  Outcome: Progressing  [address] (Taken 10/12/2022 1630)  Absence of Physical Injury: Implement safety measures based on patient assessment     Problem: Respiratory - Adult  Goal: Achieves optimal ventilation and oxygenation  10/12/2022 1802 by Marlen Claros RN  Outcome: Progressing  Flowsheets (Taken 10/12/2022 1400)  Achieves optimal ventilation and oxygenation:   Assess for changes in mentation and behavior   Assess for changes in respiratory status   Position to facilitate oxygenation and minimize respiratory effort   Oxygen supplementation based on oxygen saturation or arterial blood gases   Encourage broncho-pulmonary hygiene including cough, deep breathe, incentive spirometry  10/12/2022 1748 by Marlen Claros RN  Outcome: Progressing  Flowsheets (Taken 10/12/2022 1400)  Achieves optimal ventilation and oxygenation:   Assess for changes in mentation and behavior   Assess for changes in respiratory status   Position to facilitate oxygenation and minimize respiratory effort   Oxygen supplementation based on oxygen saturation or arterial blood gases   Encourage broncho-pulmonary hygiene including cough, deep breathe, incentive spirometry  10/12/2022 0456 by Asim Simmons RCP  Outcome: Progressing     Problem: Cardiovascular - Adult  Goal: Maintains optimal cardiac output and hemodynamic stability  10/12/2022 1802 by Kendy Verduzco RN  Outcome: Progressing  Flowsheets (Taken 10/12/2022 1400)  Maintains optimal cardiac output and hemodynamic stability:   Monitor blood pressure and heart rate   Monitor urine output and notify Licensed Independent Practitioner for values outside of normal range   Administer fluid and/or volume expanders as ordered  10/12/2022 1748 by Kendy Verduzco RN  Outcome: Progressing  Flowsheets (Taken 10/12/2022 1400)  Maintains optimal cardiac output and hemodynamic stability:   Monitor blood pressure and heart rate   Monitor urine output and notify Licensed Independent Practitioner for values outside of normal range   Administer fluid and/or volume expanders as ordered  Goal: Absence of cardiac dysrhythmias or at baseline  Recent Flowsheet Documentation  Taken 10/12/2022 1400 by Kendy Verduzco RN  Absence of cardiac dysrhythmias or at baseline:   Monitor cardiac rate and rhythm   Assess for signs of decreased cardiac output   Administer antiarrhythmia medication and electrolyte replacement as ordered  Goal: Absence of cardiac dysrhythmias or at baseline  Outcome: Progressing  Flowsheets (Taken 10/12/2022 1400)  Absence of cardiac dysrhythmias or at baseline:   Monitor cardiac rate and rhythm   Assess for signs of decreased cardiac output   Administer antiarrhythmia medication and electrolyte replacement as ordered     Problem: Skin/Tissue Integrity - Adult  Goal: Skin integrity remains intact  Outcome: Progressing  Flowsheets  Taken 10/12/2022 1629  Skin Integrity Remains Intact:   Monitor for areas of redness and/or skin breakdown   Assess vascular access sites hourly  Taken 10/12/2022 1400  Skin Integrity Remains Intact:   Monitor for areas of redness and/or skin breakdown   Assess vascular access sites hourly  Goal: Incisions, wounds, or drain sites healing without S/S of infection  Outcome: Progressing  Flowsheets  Taken 10/12/2022 1629  Incisions, Wounds, or Drain Sites Healing Without Sign and Symptoms of Infection:   ADMISSION and DAILY: Assess and document risk factors for pressure ulcer development   TWICE DAILY: Assess and document skin integrity   TWICE DAILY: Assess and document dressing/incision, wound bed, drain sites and surrounding tissue  Taken 10/12/2022 1400  Incisions, Wounds, or Drain Sites Healing Without Sign and Symptoms of Infection:   TWICE DAILY: Assess and document skin integrity   ADMISSION and DAILY: Assess and document risk factors for pressure ulcer development   TWICE DAILY: Assess and document dressing/incision, wound bed, drain sites and surrounding tissue  Goal: Oral mucous membranes remain intact  Outcome: Progressing  Flowsheets  Taken 10/12/2022 1629  Oral Mucous Membranes Remain Intact:   Implement preventative oral hygiene regimen   Assess oral mucosa and hygiene practices   Implement oral medicated treatments as ordered  Taken 10/12/2022 1400  Oral Mucous Membranes Remain Intact:   Implement preventative oral hygiene regimen   Assess oral mucosa and hygiene practices     Problem: Infection - Adult  Goal: Absence of infection at discharge  Outcome: Progressing  Flowsheets (Taken 10/12/2022 1400)  Absence of infection at discharge:   Assess and monitor for signs and symptoms of infection   Monitor lab/diagnostic results   Monitor all insertion sites i.e., indwelling lines, tubes and drains   Identify and instruct in appropriate isolation precautions for identified infection/condition   Instruct and encourage patient and family to use good hand hygiene technique  Goal: Absence of infection during hospitalization  Recent Flowsheet Documentation  Taken 10/12/2022 1400 by Bing Krabbe, RN  Absence of infection during hospitalization:   Assess and monitor for signs and symptoms of infection   Monitor lab/diagnostic results   Monitor all insertion sites i.e., indwelling lines, tubes and drains   Identify and instruct in appropriate isolation precautions for identified infection/condition   Instruct and encourage patient and family to use good hand hygiene technique  Goal: Absence of infection during hospitalization  Outcome: Progressing  Flowsheets (Taken 10/12/2022 1400)  Absence of infection during hospitalization:   Assess and monitor for signs and symptoms of infection   Monitor lab/diagnostic results   Monitor all insertion sites i.e., indwelling lines, tubes and drains   Identify and instruct in appropriate isolation precautions for identified infection/condition   Instruct and encourage patient and family to use good hand hygiene technique  Goal: Absence of fever/infection during anticipated neutropenic period  Outcome: Progressing  Flowsheets (Taken 10/12/2022 1400)  Absence of fever/infection during anticipated neutropenic period:   Monitor white blood cell count   Administer growth factors as ordered     Problem: Metabolic/Fluid and Electrolytes - Adult  Goal: Electrolytes maintained within normal limits  Outcome: Progressing  Flowsheets (Taken 10/12/2022 1400)  Electrolytes maintained within normal limits:   Monitor labs and assess patient for signs and symptoms of electrolyte imbalances   Administer electrolyte replacement as ordered   Monitor response to electrolyte replacements, including repeat lab results as appropriate  Goal: Hemodynamic stability and optimal renal function maintained  Outcome: Progressing  Flowsheets (Taken 10/12/2022 1400)  Hemodynamic stability and optimal renal function maintained:   Monitor labs and assess for signs and symptoms of volume excess or deficit   Monitor intake, output and patient weight   Monitor response to interventions for patient's volume status, including labs, urine output, blood pressure (other measures as available)   Encourage oral intake as appropriate  Goal: Glucose maintained within prescribed range  Recent Flowsheet Documentation  Taken 10/12/2022 1400 by Jose Hernandez RN  Glucose maintained within prescribed range:   Monitor blood glucose as ordered   Administer ordered medications to maintain glucose within target range   Assess barriers to adequate nutritional intake and initiate nutrition consult as needed     Problem: Hematologic - Adult  Goal: Maintains hematologic stability  Outcome: Progressing  Flowsheets (Taken 10/12/2022 1400)  Maintains hematologic stability:   Assess for signs and symptoms of bleeding or hemorrhage   Monitor labs for bleeding or clotting disorders     Problem: Neurosensory - Adult  Goal: Achieves maximal functionality and self care  Outcome: Progressing  Flowsheets (Taken 10/12/2022 1400)  Achieves maximal functionality and self care:   Monitor swallowing and airway patency with patient fatigue and changes in neurological status   Encourage and assist patient to increase activity and self care with guidance from physical therapy/occupational therapy  Goal: Achieves stable or improved neurological status  Outcome: Progressing  Flowsheets (Taken 10/12/2022 1400)  Achieves stable or improved neurological status:   Assess for and report changes in neurological status   Initiate measures to prevent increased intracranial pressure   Maintain blood pressure and fluid volume within ordered parameters to optimize cerebral perfusion and minimize risk of hemorrhage     Problem: Musculoskeletal - Adult  Goal: Return mobility to safest level of function  Outcome: Progressing  Flowsheets (Taken 10/12/2022 1400)  Return Mobility to Safest Level of Function:   Assess patient stability and activity tolerance for standing, transferring and ambulating with or without assistive devices   Assist with transfers and ambulation using safe patient handling equipment as needed  Goal: Maintain proper alignment of affected body part  Flowsheets (Taken 10/12/2022 1400)  Maintain proper alignment of affected body part: Support and protect limb and body alignment per provider's orders  Goal: Return ADL status to a safe level of function  Outcome: Progressing  Flowsheets (Taken 10/12/2022 1802)  Return ADL Status to a Safe Level of Function:   Administer medication as ordered   Assess activities of daily living deficits and provide assistive devices as needed     Problem: Gastrointestinal - Adult  Goal: Minimal or absence of nausea and vomiting  Outcome: Progressing  Flowsheets (Taken 10/12/2022 1400)  Minimal or absence of nausea and vomiting:   Administer IV fluids as ordered to ensure adequate hydration   Administer ordered antiemetic medications as needed   Provide nonpharmacologic comfort measures as appropriate  Goal: Maintains or returns to baseline bowel function  Outcome: Progressing  Flowsheets (Taken 10/12/2022 1400)  Maintains or returns to baseline bowel function:   Assess bowel function   Encourage oral fluids to ensure adequate hydration   Administer IV fluids as ordered to ensure adequate hydration   Encourage mobilization and activity   Administer ordered medications as needed  Goal: Maintains adequate nutritional intake  Outcome: Progressing  Flowsheets (Taken 10/12/2022 1400)  Maintains adequate nutritional intake:   Monitor percentage of each meal consumed   Assist with meals as needed     Problem: Skin/Tissue Integrity  Goal: Absence of new skin breakdown  Description: 1.   Monitor for areas of redness and/or skin breakdown  2. Assess vascular access sites hourly  3. Every 4-6 hours minimum:  Change oxygen saturation probe site  4. Every 4-6 hours:  If on nasal continuous positive airway pressure, respiratory therapy assess nares and determine need for appliance change or resting period. Outcome: Progressing  Note: No new skin breakdown this shift. Pt turned every 2 hours and as needed.          Problem: Genitourinary - Adult  Goal: Absence of urinary retention  Outcome: Progressing  Flowsheets (Taken 10/12/2022 1400)  Absence of urinary retention:   Assess patients ability to void and empty bladder   Monitor intake/output and perform bladder scan as needed   Place urinary catheter per Licensed Independent Practitioner order if needed     Problem: Chronic Conditions and Co-morbidities  Goal: Patient's chronic conditions and co-morbidity symptoms are monitored and maintained or improved  Outcome: Progressing  Flowsheets (Taken 10/12/2022 1400)  Care Plan - Patient's Chronic Conditions and Co-Morbidity Symptoms are Monitored and Maintained or Improved:   Monitor and assess patient's chronic conditions and comorbid symptoms for stability, deterioration, or improvement   Collaborate with multidisciplinary team to address chronic and comorbid conditions and prevent exacerbation or deterioration   Update acute care plan with appropriate goals if chronic or comorbid symptoms are exacerbated and prevent overall improvement and discharge     Problem: Nutrition Deficit:  Goal: Optimize nutritional status  10/12/2022 1802 by Kevin Agudelo RN  Outcome: Progressing  Flowsheets (Taken 10/12/2022 1802)  Nutrient intake appropriate for improving, restoring, or maintaining nutritional needs:   Assess nutritional status and recommend course of action   Monitor oral intake, labs, and treatment plans   Recommend appropriate diets, oral nutritional supplements, and vitamin/mineral supplements  10/12/2022 1616 by Lucila Guy RD, LD  Outcome: Progressing  Flowsheets (Taken 10/12/2022 0310 by Darrius Martinez RN)  Nutrient intake appropriate for improving, restoring, or maintaining nutritional needs:   Assess nutritional status and recommend course of action   Monitor oral intake, labs, and treatment plans   Care plan reviewed with patient. Patient verbalize understanding of the plan of care and contribute to goal setting.

## 2022-10-12 NOTE — CARE COORDINATION
Collaborative Discharge Planning    Usha Leyva  :  1965  MRN:  [id number]    ADMIT DATE:  2022      Discharge Planning Discharge Planning  Type of Residence: House  Living Arrangements: Spouse/Significant Other  Support Systems: Spouse/Significant Other  Current Services Prior To Admission: None  Potential Assistance Needed: N/A  DME Ordered?: No  Potential Assistance Purchasing Medications: Yes  Type of Home Care Services: None  Patient expects to be discharged to[de-identified] House  Follow Up Appointment: Best Day/Time : Monday AM  One/Two Story Residence: [facility]  # of Interior Steps: 12  Height of Each Step (in): 6.5 INCHES  Interior Rails: Right  Lift Chair Available: No  History of falls?: No  White Board Notes /Social Work Whiteboard Notes  /Social Work Whiteboard: 10/10; SW: plans home w spouse Rosette Ospina, has HD T-R-S w AVF;  [facility] for RN services    Discharge Plan Home with home health  plans home w spouse Michael [facility] (nsg, therapy), current w [address]  [facility] [address] M-T-F [address] chair time; B/W Cab transports to HD; has AVF; therapy following; [facility]    Discharge Milestones and Delays: Clinical status    ESRD/Loculated Right Pleural Effusion/Pneumothorax/Right Entrapped Lung      Right Chest Tube  10/4 TPA #1  10/6 BAL: Citrobacter Koseri  10/10 Right Thoracotomy w Decortication, Partial Lung Resection   10/13 Right Chest Tube(s) Removal     Right Chest Tubes Output = 503 ml/24h     Creatinine 10; monitor     SIGNED:  Tosin Solorzano RN   10/12/2022, 12:40 PM

## 2022-10-12 NOTE — PROGRESS NOTES
CT/CV Surgery Progress Note    10/12/2022 1:29 PM  Surgeon:  Dr. Harriet Abel     Subjective: Mr. Renee Zafar is resting comfortably in bed, alert, and in no acute distress. Pt denies chest pressure, SOB, fever,chills, N/V/D. He is on 1L O2 NC currently. Pt received and tolerated HD this morning. Chest tube output 503 mL past 24 hrs. S/p Right Thoracotomy with decort and wedge resections POD # 2    I/O last 3 completed shifts: In: 12 [P.O.:790; I.V.:10]  Out: 1403 [Chest Tube:1403]    Vital Signs: /66   Pulse 96   Temp 97.1 °F (36.2 °C)   Resp 18   Ht 6' 3\" (1.905 m)   Wt 198 lb 6.6 oz (90 kg)   SpO2 90%   BMI 24.80 kg/m²    Temp (24hrs), Av.8 °F (36.6 °C), Min:97 °F (36.1 °C), Max:98.9 °F (37.2 °C)    Labs:   CBC:   Recent Labs     10/11/22  0336 10/12/22  0451   WBC 14.2* 7.8   HGB 11.0* 9.0*   HCT 36.0* 29.5*   .9* 102.8*    125*       BMP:   Recent Labs     10/10/22  0335 10/11/22  0336 10/11/22  1129 10/11/22  1811 10/12/22  0451    136  --   --  136   K 5.2 5.7* 5.9* 4.9 4.5   CL 99 101  --   --  99   CO2 24 25  --   --  23   PHOS  --   --   --   --  4.2   BUN 77* 37*  --   --  55*   CREATININE 10.6* 7.2*  --   --  10.0*   MG  --   --   --   --  2.4       Imaging:  Chest X-Ray: 10/12/2022   Findings:   3 large bore right chest tubes. Small residual right pneumothorax with    interpleural distance of 0.3 cm at the apex, previously 1.9 cm. Right pleural effusion. Airspace consolidation right lung base. Small left    pleural effusion. The cardiac silhouette is stable in size. Aortic stent graft. Bony thorax is grossly intact. Impression   Impression:   1. Small residual right pneumothorax, decreased since the prior study. 2. Bilateral pleural effusions. Right basilar airspace consolidation. Findings similar to previous study.        This document has been electronically signed by: Chaz Stern MD on    10/12/2022 03:08 AM       Intake/Output Summary (Last 24 hours) at 10/12/2022 1329  Last data filed at 10/12/2022 1205  Gross per 24 hour   Intake 1040 ml   Output 2558 ml   Net -1518 ml       Scheduled Meds:    epoetin qiana-epbx  2,000 Units SubCUTAneous Once per day on Mon Wed Fri    And    epoetin qiana-epbx  2,000 Units SubCUTAneous Once per day on Mon Wed Fri    sodium chloride flush  5-40 mL IntraVENous 2 times per day    minoxidil  2.5 mg Oral BID    ketamine  100 mg IntraVENous Once    doxycycline hyclate  100 mg Oral 2 times per day    dexamethasone  0.5 mg Oral Daily    docusate sodium  100 mg Oral BID    senna  1 tablet Oral Nightly    [Held by provider] aspirin  81 mg Oral Daily    atorvastatin  40 mg Oral Nightly    cloNIDine  0.3 mg Oral TID    doxazosin  8 mg Oral Nightly    famotidine  20 mg Oral Daily    ferrous sulfate  325 mg Oral Daily with breakfast    folic acid  3,911 mcg Oral Daily    fluticasone  1 spray Nasal Daily    isosorbide mononitrate  120 mg Oral Daily    sertraline  100 mg Oral Daily    verapamil  240 mg Oral Daily    multivitamin  1 tablet Oral Daily    sodium chloride flush  10 mL IntraVENous 2 times per day     ROS: All neg unless specifically mentioned in subjective section. Exam:  General Appearance: alert ,conversing, in no acute distress  Cardiovascular: normal rate, regular rhythm, normal S1 and S2, no murmurs, rubs, clicks, or gallops  Pulmonary/Chest: clear to auscultation bilaterally- no wheezes, rales or rhonchi, normal air movement, no respiratory distress  Neurological: alert, oriented, normal speech, no focal findings or movement disorder noted  Rt posterior chest incision: Incision healing appropriately and no wound dehiscence noted. Chest tubes in place.     Assessment:   Patient Active Problem List   Diagnosis    FSGS (focal segmental glomerulosclerosis)    Diabetes mellitus type 2, controlled ([facility]    Monoclonal (M) protein disease, multiple 'M' protein    Depression    PTSD (post-traumatic stress disorder)    Secondary renal hyperparathyroidism (HCC)    Cocaine use    Substance induced mood disorder (HCC)    Renal artery stenosis (HCC) with uncontrollable hypertension    Chronic alcoholism (HCC)    Severe cocaine use disorder (HCC)    Uncontrolled hypertension    LVH (left ventricular hypertrophy) due to hypertensive disease    Acute on chronic diastolic CHF (congestive heart failure) (HCC)    REZA (obstructive sleep apnea)    Headache, unspecified headache type    FH: HTN (hypertension)    Hypertrophic cardiomyopathy ([address]    Diet-controlled diabetes mellitus ([address]    A-V fistula (HCC)    Chronic thoracic aortic dissection    Hyperphosphatemia    Anemia in CKD (chronic kidney disease)    Vitamin D deficiency    Thrombocytopenia (HCC)    Personality disorder (HCC)    Tobacco abuse    Metabolic acidosis    Pneumonia due to organism    Precordial pain    ESRD on hemodialysis (HCC)    Edema of upper extremity    ESRD (end stage renal disease) ([address]    Hyperglycemia    Cocaine abuse, continuous - reports spending $100 on cocaine \"every other day\"    Homelessness    Moderate episode of recurrent major depressive disorder ([address]    Noncompliance of patient with renal dialysis ([address]    Anemia associated with stage 5 chronic renal failure ([address]    Hypertensive emergency    Cocaine use disorder, severe, dependence ([address]    Alcohol use disorder, severe, dependence ([address]    Abdominal aortic aneurysm (AAA) without rupture    Atrial flutter with rapid ventricular response (HCC)    PVD (peripheral vascular disease) ([address]    Dyslipidemia    Other fluid overload    Laceration of flexor muscle, fascia and tendon of left thumb at forearm level, initial encounter    Acute respiratory failure with hypoxia and hypercarbia (HCC)    Acute pulmonary edema (HCC)    Macrocytic anemia    History of aortic aneurysm repair    Medical non-compliance    Volume overload    Chest pain    Dyspnea    Pancytopenia (HCC)    Acute febrile illness Hyponatremia    Hypermagnesemia    History of atrial flutter    History of alcohol abuse    History of cocaine abuse (HCC)    Mild tricuspid regurgitation    Uremia, acute    COVID-19 virus detected    COVID-19    Hypoxia     Plan:   CXR reviewed- Continue daily CXR's    Keep chest tubes in place with suction.   Continue current therapy    The plan of care was discussed in detail with Dr. Maura Madrigal     Electronically signed by Victor Hugo Cid PA-C on 10/12/2022 at 1:29 PM

## 2022-10-12 NOTE — PROGRESS NOTES
[facility]   Nephrology progress note  10/12/2022, 8:31 AM      Pt Name:    Sofi Eldridge  MRN:     [id number]     YOB: 1965  Admit Date:    9/28/2022  9:56 AM    Chief Complaint: Nephrology following for ESRD. Subjective:  Patient was seen and examined this morning on hemodialysis. Feels ok. On nasal canula, denies SOB. /71, UF 2 liters. Objective:  24HR INTAKE/OUTPUT:    Intake/Output Summary (Last 24 hours) at 10/12/2022 0831  Last data filed at 10/12/2022 0428  Gross per 24 hour   Intake 640 ml   Output 503 ml   Net 137 ml         I/O last 3 completed shifts: In: 12 [P.O.:790; I.V.:10]  Out: 1403 [Chest QZXD:5340]  No intake/output data recorded.    Admission weight: 250 lb (113.4 kg)  Wt Readings from Last 3 Encounters:   10/12/22 202 lb 13.2 oz (92 kg)   09/13/21 250 lb (113.4 kg)   07/26/21 240 lb (108.9 kg)        Vitals :   Vitals:    10/11/22 2218 10/11/22 2313 10/12/22 0428 10/12/22 0735   BP:  (!) 124/58 (!) 119/56 (!) 141/67   Pulse:  87 84 88   Resp: 18 18 18 16   Temp:  97.7 °F (36.5 °C) 97.4 °F (36.3 °C) 97 °F (36.1 °C)   TempSrc:  Oral Oral    SpO2:  97% 96% 90%   Weight:   216 lb (98 kg) 202 lb 13.2 oz (92 kg)   Height:           Physical examination  General Appearance: alert and cooperative with exam, appears comfortable, no distress  Mouth/Throat: Oral mucosa moist  Neck: No JVD  Lungs: diminished, right sided chest tubes  Heart:  S1, S2 heard  GI: soft, non-tender  Extremities: no significant LE edema, left arm AV fistula    Medications:  Infusion:    sodium chloride      sodium chloride       Meds:    epoetin qiana-epbx  4,000 Units SubCUTAneous Once per day on Mon Wed Fri    sodium chloride flush  5-40 mL IntraVENous 2 times per day    minoxidil  2.5 mg Oral BID    ketamine  100 mg IntraVENous Once    doxycycline hyclate  100 mg Oral 2 times per day    dexamethasone  0.5 mg Oral Daily    docusate sodium  100 mg Oral BID    senna  1 tablet Oral Nightly    [Held by provider] aspirin  81 mg Oral Daily    atorvastatin  40 mg Oral Nightly    cloNIDine  0.3 mg Oral TID    doxazosin  8 mg Oral Nightly    famotidine  20 mg Oral Daily    ferrous sulfate  325 mg Oral Daily with breakfast    folic acid  6,267 mcg Oral Daily    fluticasone  1 spray Nasal Daily    isosorbide mononitrate  120 mg Oral Daily    sertraline  100 mg Oral Daily    verapamil  240 mg Oral Daily    multivitamin  1 tablet Oral Daily    sodium chloride flush  10 mL IntraVENous 2 times per day     Meds prn: sodium chloride flush, sodium chloride, nicotine polacrilex, oxyCODONE-acetaminophen **OR** oxyCODONE-acetaminophen, morphine **OR** morphine, promethazine **OR** ondansetron, albuterol sulfate HFA, hydrOXYzine HCl, traZODone, sodium chloride flush, sodium chloride, ondansetron **OR** [DISCONTINUED] ondansetron, polyethylene glycol, acetaminophen, morphine     Lab Data :  CBC:   Recent Labs     10/11/22  0336 10/12/22  0451   WBC 14.2* 7.8   HGB 11.0* 9.0*   HCT 36.0* 29.5*    125*     CMP:  Recent Labs     10/10/22  0335 10/11/22  0336 10/11/22  1129 10/11/22  1811 10/12/22  0451    136  --   --  136   K 5.2 5.7* 5.9* 4.9 4.5   CL 99 101  --   --  99   CO2 24 25  --   --  23   BUN 77* 37*  --   --  55*   CREATININE 10.6* 7.2*  --   --  10.0*   GLUCOSE 93 135*  --   --  97   CALCIUM 9.3 9.0  --   --  8.3*   MG  --   --   --   --  2.4   PHOS  --   --   --   --  4.2     Hepatic:   No results for input(s): LABALBU, AST, ALT, ALB, BILITOT, ALKPHOS in the last 72 hours. Assessment and Plan:    1. ESRD on HD   -MWF while inpatient  -HD today, UF 2 liters      2. Hyperkalemia : improved, 2K bath with HD today  3. HTN: improved  4. Loculated pleural effusion: s/p thoracotomy   5. Anemia in CKD : add KENYETTA  6. Secondary hyperparathyroidism  7.   Chronic fluid overload    D/W patient     Edmund Alaniz,   [facility]    This report has been created using voice recognition software.  It may contain minor errors which are inherent in voice recognition technology

## 2022-10-12 NOTE — PROGRESS NOTES
Attending supervising physicians attestation statement:       I Discussed the findings and plans with the resident physician  personally   and agree with the IM resident'S note as outlined . Also spoke with the staff  Regarding care plans and recommendations. Due to elevated BP today , changed the meds frequency , and spoke with his RN at bedside. Pt does have fluctuating BP's especially on HD days, encouraged him to be compliant with medical Rx. Electronically signed by Stanton Scott MD on 10/12/2022 at [address] [place] [address]          Internal Medicine Resident Progress Note    Patient:  Percy Thacker    YOB: 1965  Unit/Bed:4K-14/014-A  MRN: [id number]    Acct: [de-identified]   PCP: Velia Sanders MD    Date of Admission: 9/28/2022      Assessment/Plan:  Pleural effusion, right  - Multifactorial; likely secondary to hemodialysis noncompliance  - S/p chest tube 09/28  - S/p chemical pneumolysis 10/04 with tPA and dornase injection  - S/p bronchoscopy 10/06  - S/p 10/10 Right Thoracotomy & Decortication with Partial Lung Resection; Multiple chest tubes in place post procedure  - 503 cc serosanguineous drainage in the last 24 hours  - Pleural studies indicating exudative effusion with cytology negative for malignant cells  - Lung resection biopsy showing   \"Pleural fibrosis and fibrinous changes. Diffuse alveolar hemorrhage with abundant intra-alveolar hemosiderin   laden macrophages. Negative for malignancy. \"  -CT surgery and pulmonology following    Acute hypoxic respiratory failure  - Patient maintaining adequate oxygenation on 2L; wean down to room air as tolerated; titrate to maintain SPO2 92 to 96%  - S/p chest tube placement 09/28  - Likely secondary to pleural effusion as well as Hx polysubstance abuse as well as poor lung expansion  - Patient encouraged to use incentive spirometer  - S/p 10/10 Right Thoracotomy & Decortication with Partial Lung Resection; Multiple chest tubes in place post procedure  - Continue 0.5 mg Decadron per pulmonology    Trapped lung, right  - S/p chest tube 09/28  - S/p chemical pneumolysis 10/04 with tPA and dornase injection  - S/p bronchoscopy 10/06  - 900 cc serosanguineous drainage in the last 24 hours  - Pleural studies indicating exudative effusion with cytology negative for malignant cells  -S/p 10/10 Right Thoracotomy & Decortication with Partial Lung Resection; 3 chest tubes in place post procedure  - Lung resection biopsy showing \"  Pleural fibrosis and fibrinous changes. Diffuse alveolar hemorrhage with abundant intra-alveolar hemosiderin laden macrophages. Negative for malignancy. \"  - Pain management per CT surgery    Right pneumothorax, apical  -Noted on 10/10/2022 CXR with estimated volume of 10%    Right lower lobe infiltrate  - Noted on CTA of the chest on 10/02  - Associated with exudative pleural effusion  - S/p chest tube placement 09/28  - S/p chemical pneumolysis 10/04 with tPA dornase injection  - S/p bronchoscopy 10/06  - Day 9/30 doxycycline per pulmonology recommendation  - Incentive spirometry, bronchodilation per pulmonology recommendations  - Repeat imaging to assess for interval changes per pulmonology  - S/p 10/10 Right Thoracotomy & Decortication with Partial Lung Resection; Multiple chest tubes in place post procedure     ESRD, on hemodialysis  - Patient on MWF dialysis schedule  - Continue with low phosphorus and low potassium diet  - Nephrology following  - Judicious IV fluids and diuresis per nephrology recommendations  - Strict I's and O's  - S/p albumin infusion 10/07  -Patient started on kayexalate and lokelma per nephrology  - Dialysis on 10/12/2022    Hyperkalemia  - Currently on MWF dialysis schedule  - Continue low potassium diet  -Patient ordered lokelma and kayexalate per nephrology  - Trend BMP    Systolic heart failure with preserved ejection fraction  - Echo 9/29 increased left ventricular wall thickness with EF 60%  - Strict I's and O's, daily weights  - Judicious IV fluids and diuresis per nephrology  - Continue Imdur  -10/10 dialysis with 2L fluid removal without complication    Hypertrophic cardiomyopathy  - Echo 9/29 showing severe left ventricular concentric hypertrophy as well as dilation of the left atrium  - Continue aspirin, statin, isosorbide mononitrate, verapamil, minoxidil  - Strict I's and O's, daily weights  - Judicious IV fluids and diuresis per nephrology    Primary HTN with current hypotension  - Continue to monitor vital signs  - Continue with clonidine, verapamil, minoxidil, and doxazosin regimen with hold parameters    PTSD  - Continue sertraline, Atarax, trazodone  - Mood stable    Unspecified depressive disorder  - Continue sertraline  - Mood stable    GERD  - Continue PPI    Polysubstance abuse  - Patient counseled on substance abuse cessation    HLD  - Continue atorvastatin    Anemia of chronic disease  - Trend CBC  - Continue iron and folate supplementation    REZA  - Patient tolerating BiPAP at night  - Recommendation for outpatient follow-up for PAP titration with sleep clinic    Thoracic spondylosis  - Noted on CTA of the chest on 10/02  - PT/OT consulted; 10/05 [facility]     Hyperphosphatemia, resolved  - Currently on MWF dialysis schedule  - Continue low phosphorus diet  - Patient's home phosphorus binder resumed  - Trend phosphorus and BMP    Pneumothorax, right lower lobe, right, resolved   Apical pneumothorax, right, resolved  Hx Thoracic Aneurysm  Hx AAA s/p aortic stent graft 7/2018  Hx COVID-19 pneumonia  Hx atrial flutter  Hx peripheral vascular disease  Hx hypertensive emergency  - Noted    Expected discharge date: 10/11/2022    Disposition:   [x] Home  [] TCU  [] Rehab  [] Psych  [] SNF  [] [place]  [] Other-    ===================================================================      Chief Complaint: Shortness of breath    Hospital Course:    This is a 68-year-old -American male who presented to [address] [place] [address] on 09/28/2022 with worsening shortness of breath at rest and with exertion. CXR showed large right pleural effusion and patient required PAP therapy for adequate oxygenation. Patient was admitted and chest tube placed on 09/28 with 2 L of serosanguineous fluid drainage. Dialysis also performed at 09/28 with 2 L of fluid. Repeat dialysis performed on 09/29 with albumin administration. Repeat dialysis performed on 09/30/2022. Pleural fluid analysis was consistent with exudative effusion with negative cytology. Repeat CTA of the chest showed improvement in the right-sided pleural effusion, but showed evidence of a right apical pneumothorax which resolved on subsequent imaging. Repeat dialysis performed on 10/03 with 2 L removed. Dornase and alteplase administered through chest tube on 10/04 by intensivist.  Hemodialysis performed on 10/05 with 2 L of fluid removed. Bronchoscopy performed on 33/12 without complication. There was evidence of chronic inflammation with pitting airway disease with no mass identified. Dialysis performed on 10/07 with 0.5 L of fluid removed. Dialysis performed on 10/10 with 2L fluid removed. Right thoracotomy with decortication and partial lung resection was performed without complication. Biopsies performed showed \"Pleural fibrosis and fibrinous changes. Diffuse alveolar hemorrhage with abundant intra-alveolar hemosiderin laden macrophages. Negative for malignancy. \" Dialysis performed on 10/12/2022. Subjective (past 24 hours):   Patient seen and evaluated at dialysis. He denies chest pain, fever, chills, nausea, vomiting, and diarrhea. He is tolerating his chest tubes without any right-sided pain. He states that he is eating and ambulating well after his procedure. He denies any acute symptoms or concerns. ROS: reviewed complete ROS unchanged unless otherwise stated in hospital course/subjective portion.        Medications: Reviewed    Infusion Medications    sodium chloride      sodium chloride       Scheduled Medications    sodium chloride flush  5-40 mL IntraVENous 2 times per day    minoxidil  2.5 mg Oral BID    ketamine  100 mg IntraVENous Once    doxycycline hyclate  100 mg Oral 2 times per day    dexamethasone  0.5 mg Oral Daily    docusate sodium  100 mg Oral BID    senna  1 tablet Oral Nightly    [Held by provider] aspirin  81 mg Oral Daily    atorvastatin  40 mg Oral Nightly    cloNIDine  0.3 mg Oral TID    doxazosin  8 mg Oral Nightly    famotidine  20 mg Oral Daily    ferrous sulfate  325 mg Oral Daily with breakfast    folic acid  5,293 mcg Oral Daily    fluticasone  1 spray Nasal Daily    isosorbide mononitrate  120 mg Oral Daily    sertraline  100 mg Oral Daily    verapamil  240 mg Oral Daily    multivitamin  1 tablet Oral Daily    sodium chloride flush  10 mL IntraVENous 2 times per day     PRN Meds: sodium chloride flush, sodium chloride, nicotine polacrilex, oxyCODONE-acetaminophen **OR** oxyCODONE-acetaminophen, morphine **OR** morphine, promethazine **OR** ondansetron, albuterol sulfate HFA, hydrOXYzine HCl, traZODone, sodium chloride flush, sodium chloride, ondansetron **OR** [DISCONTINUED] ondansetron, polyethylene glycol, acetaminophen, morphine        Intake/Output Summary (Last 24 hours) at 10/12/2022 0729  Last data filed at 10/12/2022 0428  Gross per 24 hour   Intake 640 ml   Output 503 ml   Net 137 ml       Exam:  BP (!) 119/56   Pulse 84   Temp 97.4 °F (36.3 °C) (Oral)   Resp 18   Ht 6' 3\" (1.905 m)   Wt 216 lb (98 kg)   SpO2 96%   BMI 27.00 kg/m²     General: No distress, appears stated age. Eyes:  PERRL. Conjunctivae/corneas clear. HENT: Head normal appearing. Nares normal. Oral mucosa moist.  Hearing intact. Neck: Supple, with full range of motion. Trachea midline. No gross JVD appreciated. Respiratory: Normal respiratory effort. No wheezes, crackles, or rhonchi.   Chest tubes in place draining serosanguineous fluid. Nasal cannula in place. Cardiovascular: Normal rate, regular rhythm with normal S1/S2 without murmurs. No lower extremity edema. Abdomen: Soft, non-tender, non-distended with normal bowel sounds. Musculoskeletal: No joint swelling or tenderness. Normal tone. No abnormal movements. Protuberance associated with dialysis port in left arm  Skin: Warm and dry. No rashes or lesions. Neurologic:  No focal sensory/motor deficits in the upper or lower extremities. Cranial nerves:  grossly non-focal 2-12. Psychiatric: Alert and oriented, normal insight and thought content. Capillary Refill: Brisk,< 3 seconds. Peripheral Pulses: +2 palpable, equal bilaterally. Labs:   Recent Labs     10/11/22  0336 10/12/22  0451   WBC 14.2* 7.8   HGB 11.0* 9.0*   HCT 36.0* 29.5*    125*     Recent Labs     10/10/22  0335 10/11/22  0336 10/11/22  1129 10/11/22  1811 10/12/22  0451    136  --   --  136   K 5.2 5.7* 5.9* 4.9 4.5   CL 99 101  --   --  99   CO2 24 25  --   --  23   BUN 77* 37*  --   --  55*   CREATININE 10.6* 7.2*  --   --  10.0*   CALCIUM 9.3 9.0  --   --  8.3*   PHOS  --   --   --   --  4.2     No results for input(s): AST, ALT, BILIDIR, BILITOT, ALKPHOS in the last 72 hours. No results for input(s): INR in the last 72 hours. No results for input(s): Corky Stallion in the last 72 hours. No results for input(s): PROCAL in the last 72 hours. Lab Results   Component Value Date/Time    NITRU NEGATIVE 04/03/2018 07:45 PM    WBCUA 2-4 04/03/2018 07:45 PM    BACTERIA NONE 04/03/2018 07:45 PM    RBCUA 5-10 04/03/2018 07:45 PM    BLOODU SMALL 04/03/2018 07:45 PM    SPECGRAV 1.014 03/07/2018 09:10 PM    GLUCOSEU NEGATIVE 04/03/2018 07:45 PM       Radiology (48 hours):  XR CHEST PORTABLE    Result Date: 10/5/2022  Small pneumothorax at the right lung base. Chest tube tip is at the right lung base, grossly unchanged. Small right pleural effusion, decreased.  Right lung base consolidation, increased, suspicious for atelectasis or pneumonia. Cardiomegaly. This document has been electronically signed by: Ruben Sharma MD on 10/05/2022 03:41 AM        DVT prophylaxis:    [] Lovenox  [] SCDs  [x] SQ Heparin  [] Encourage ambulation   [] Already on Anticoagulation       Diet: ADULT DIET; Regular; 3 carb choices (45 gm/meal);  Low Sodium (2 gm)  Code Status: Full Code  PT/OT: Consulted      Electronically signed by Richard Rm MD on 10/12/2022 at 7:29 AM    Case was discussed with Attending, Dr. Elyssa Hogue

## 2022-10-12 NOTE — PROGRESS NOTES
Comprehensive Nutrition Assessment    Type and Reason for Visit:  Reassess    Nutrition Recommendations/Plan:   Provide carb controlled diet ~2000kcals/day low sodium to meet estimated needs. .    Monitor nutirtion related lab values, po intake & wt. Did not initiate  Nepro ONS due to Creatinine 10 (10/12). Pt s/p HD (10/12), po improving % noted. Malnutrition Assessment:  Malnutrition Status: At risk for malnutrition (Comment) (10/12/22 0415)    Context:  Acute Illness     Findings of the 6 clinical characteristics of malnutrition:  Energy Intake:  No significant decrease in energy intake  Weight Loss:  Unable to assess (due to fluid fluctuations /edema+2 at admit)     Body Fat Loss:  No significant body fat loss     Muscle Mass Loss:  No significant muscle mass loss    Fluid Accumulation:  Unable to assess     Strength:  Not Performed     Nutrition Assessment:     Pt improving from a nutritional standpoint AEB pt reports good appetite and average intake %. Remains at risk for further nutritional compromise r/t admit 9/28- presented with SOB; large right sided pleural effusion- R chest tube placed 9/28; bronch and chest tube 10/6; increased needs for wound healing (see below), respiratory acidosis; and underlying medical condition (PMH: cocaine abuse, DM, ESRD on HD    Nutrition Related Findings:    Pt. Report/Treatments/Miscellaneous: Pt seen, good appetite, ate all of his brunch per RN. Pt did not have breakfast due to was at HD. No questions re: diet. Mentions he goes to [facility] dialysis center T-R-Sat & follows with dietitians there. GI Status: Bm x 2 (10/9) denies feeling constipated, just had OR (10/10)  Pertinent Labs: (10/12) Creatinine 10, BUN 55, Ca Serum 8.3, Hemoglobin 9.    Pertinent Meds: Decadron , Iron 325, Folvite, Senokot     Wound Type: Surgical Incision ((10/10) rt thoracotomy & decortication with partial lung resection)    buttocks chronic rt boil    Current Nutrition Intake & Therapies:    Average Meal Intake: % (most meals)  Average Supplements Intake: None Ordered  ADULT DIET; Regular; 5 carb choices (75 gm/meal); Low Sodium (2 gm)    Anthropometric Measures:  Height: 6'3  Ideal Body Weight (IBW): 196 lbs (89 kg)    Admission Body Weight: 231 lb 9.6 oz (105.1 kg) (9/28: +2 pitting edema)  Current Body Weight: 198 lb 6.6 oz (90 kg) ((10/12) edema N/A),   IBW. Weight Source: Bed Scale  Current BMI (kg/m2): 24.8  Usual Body Weight:  (UBW ~230 lbs; wt hx per EMR: 1/28/20: 227 lbs 15.3 oz, 2/26/20: 236 lbs 8.9 oz, 7/7/20: 225 lbs 4.8 oz, 9/15/20: 228 lbs 3.2 oz, 9/17/20: 214 lbs 15.2 oz, 9/28/22: 217 lbs 9.5 oz)                       BMI Categories: Normal Weight (BMI 18.5-24. 9)    Estimated Daily Nutrient Needs:  Energy Requirements Based On: Kcal/kg  Weight Used for Energy Requirements:  (90 kgm (10/12))  Energy (kcal/day): 6070-1406 kcals (20-25kcals/kgm)  Weight Used for Protein Requirements: Ideal  Protein (g/day): 107-134 grams (1.2-1.5 grams protein/kgm IBW)     Fluid (ml/day):  per physician (HD pt)    Nutrition Diagnosis:   Increased nutrient needs related to increase demand for energy/nutrients as evidenced by wounds    Nutrition Interventions:   Food and/or Nutrient Delivery: Modify Current Diet  Nutrition Education/Counseling: Education declined  Coordination of Nutrition Care: Continue to monitor while inpatient, Interdisciplinary Rounds       Goals:  Previous Goal Met: Progressing toward Goal(s)  Goals: PO intake 75% or greater, by next RD assessment       Nutrition Monitoring and Evaluation:   Behavioral-Environmental Outcomes: None Identified  Food/Nutrient Intake Outcomes: Food and Nutrient Intake, Vitamin/Mineral Intake, Diet Advancement/Tolerance  Physical Signs/Symptoms Outcomes: Biochemical Data, Meal Time Behavior, Nutrition Focused Physical Findings, Skin, Weight, Fluid Status or Edema    Discharge Planning:     Too soon to determine     Rachel Baker RD, LD  Contact: [phone]

## 2022-10-13 ENCOUNTER — APPOINTMENT (OUTPATIENT)
Dept: GENERAL RADIOLOGY | Age: 57
DRG: 163 | End: 2022-10-13
Payer: MEDICARE

## 2022-10-13 LAB
ANION GAP SERPL CALCULATED.3IONS-SCNC: 10 MEQ/L (ref 8–16)
BUN BLDV-MCNC: 40 MG/DL (ref 7–22)
CALCIUM SERPL-MCNC: 9 MG/DL (ref 8.5–10.5)
CHLORIDE BLD-SCNC: 101 MEQ/L (ref 98–111)
CO2: 27 MEQ/L (ref 23–33)
CREAT SERPL-MCNC: 7 MG/DL (ref 0.4–1.2)
ERYTHROCYTE [DISTWIDTH] IN BLOOD BY AUTOMATED COUNT: 13.3 % (ref 11.5–14.5)
ERYTHROCYTE [DISTWIDTH] IN BLOOD BY AUTOMATED COUNT: 51.4 FL (ref 35–45)
GFR SERPL CREATININE-BSD FRML MDRD: 10 ML/MIN/1.73M2
GLUCOSE BLD-MCNC: 95 MG/DL (ref 70–108)
HCT VFR BLD CALC: 29.5 % (ref 42–52)
HEMOGLOBIN: 9.1 GM/DL (ref 14–18)
MCH RBC QN AUTO: 32 PG (ref 26–33)
MCHC RBC AUTO-ENTMCNC: 30.8 GM/DL (ref 32.2–35.5)
MCV RBC AUTO: 103.9 FL (ref 80–94)
PLATELET # BLD: 134 THOU/MM3 (ref 130–400)
PMV BLD AUTO: 9.8 FL (ref 9.4–12.4)
POTASSIUM SERPL-SCNC: 4.5 MEQ/L (ref 3.5–5.2)
RBC # BLD: 2.84 MILL/MM3 (ref 4.7–6.1)
SODIUM BLD-SCNC: 138 MEQ/L (ref 135–145)
WBC # BLD: 8 THOU/MM3 (ref 4.8–10.8)

## 2022-10-13 PROCEDURE — 85027 COMPLETE CBC AUTOMATED: CPT

## 2022-10-13 PROCEDURE — 97530 THERAPEUTIC ACTIVITIES: CPT

## 2022-10-13 PROCEDURE — 2580000003 HC RX 258: Performed by: PHARMACIST

## 2022-10-13 PROCEDURE — 6360000002 HC RX W HCPCS: Performed by: PHARMACIST

## 2022-10-13 PROCEDURE — 2060000000 HC ICU INTERMEDIATE R&B

## 2022-10-13 PROCEDURE — 2580000003 HC RX 258: Performed by: PHYSICIAN ASSISTANT

## 2022-10-13 PROCEDURE — 6370000000 HC RX 637 (ALT 250 FOR IP)

## 2022-10-13 PROCEDURE — 71045 X-RAY EXAM CHEST 1 VIEW: CPT

## 2022-10-13 PROCEDURE — APPSS30 APP SPLIT SHARED TIME 16-30 MINUTES: Performed by: PHYSICIAN ASSISTANT

## 2022-10-13 PROCEDURE — 6370000000 HC RX 637 (ALT 250 FOR IP): Performed by: PHYSICIAN ASSISTANT

## 2022-10-13 PROCEDURE — 99232 SBSQ HOSP IP/OBS MODERATE 35: CPT | Performed by: INTERNAL MEDICINE

## 2022-10-13 PROCEDURE — 97110 THERAPEUTIC EXERCISES: CPT

## 2022-10-13 PROCEDURE — 94669 MECHANICAL CHEST WALL OSCILL: CPT

## 2022-10-13 PROCEDURE — 6370000000 HC RX 637 (ALT 250 FOR IP): Performed by: STUDENT IN AN ORGANIZED HEALTH CARE EDUCATION/TRAINING PROGRAM

## 2022-10-13 PROCEDURE — 2700000000 HC OXYGEN THERAPY PER DAY

## 2022-10-13 PROCEDURE — 6360000002 HC RX W HCPCS: Performed by: INTERNAL MEDICINE

## 2022-10-13 PROCEDURE — 94760 N-INVAS EAR/PLS OXIMETRY 1: CPT

## 2022-10-13 PROCEDURE — 6370000000 HC RX 637 (ALT 250 FOR IP): Performed by: INTERNAL MEDICINE

## 2022-10-13 PROCEDURE — 36415 COLL VENOUS BLD VENIPUNCTURE: CPT

## 2022-10-13 PROCEDURE — 80048 BASIC METABOLIC PNL TOTAL CA: CPT

## 2022-10-13 RX ORDER — CINACALCET 30 MG/1
150 TABLET, FILM COATED ORAL
Status: DISCONTINUED | OUTPATIENT
Start: 2022-10-14 | End: 2022-10-15 | Stop reason: HOSPADM

## 2022-10-13 RX ORDER — CALCITRIOL 0.25 UG/1
2.25 CAPSULE, LIQUID FILLED ORAL
Status: DISCONTINUED | OUTPATIENT
Start: 2022-10-14 | End: 2022-10-15 | Stop reason: HOSPADM

## 2022-10-13 RX ADMIN — Medication 1 TABLET: at 08:17

## 2022-10-13 RX ADMIN — DOCUSATE SODIUM 100 MG: 100 CAPSULE, LIQUID FILLED ORAL at 21:17

## 2022-10-13 RX ADMIN — CEFEPIME 1000 MG: 1 INJECTION, POWDER, FOR SOLUTION INTRAMUSCULAR; INTRAVENOUS at 20:03

## 2022-10-13 RX ADMIN — SERTRALINE 100 MG: 100 TABLET, FILM COATED ORAL at 08:18

## 2022-10-13 RX ADMIN — ISOSORBIDE MONONITRATE 60 MG: 60 TABLET, EXTENDED RELEASE ORAL at 21:17

## 2022-10-13 RX ADMIN — ASPIRIN 81 MG: 81 TABLET, COATED ORAL at 08:26

## 2022-10-13 RX ADMIN — DOCUSATE SODIUM 100 MG: 100 CAPSULE, LIQUID FILLED ORAL at 08:17

## 2022-10-13 RX ADMIN — SODIUM CHLORIDE, PRESERVATIVE FREE 10 ML: 5 INJECTION INTRAVENOUS at 08:19

## 2022-10-13 RX ADMIN — CLONIDINE HYDROCHLORIDE 0.3 MG: 0.2 TABLET ORAL at 21:17

## 2022-10-13 RX ADMIN — FLUTICASONE PROPIONATE 1 SPRAY: 50 SPRAY, METERED NASAL at 08:19

## 2022-10-13 RX ADMIN — ISOSORBIDE MONONITRATE 60 MG: 60 TABLET, EXTENDED RELEASE ORAL at 08:18

## 2022-10-13 RX ADMIN — DOXAZOSIN 4 MG: 4 TABLET ORAL at 21:17

## 2022-10-13 RX ADMIN — OXYCODONE AND ACETAMINOPHEN 2 TABLET: 5; 325 TABLET ORAL at 00:11

## 2022-10-13 RX ADMIN — CEFEPIME 1000 MG: 1 INJECTION, POWDER, FOR SOLUTION INTRAMUSCULAR; INTRAVENOUS at 04:01

## 2022-10-13 RX ADMIN — ATORVASTATIN CALCIUM 40 MG: 40 TABLET, FILM COATED ORAL at 21:17

## 2022-10-13 RX ADMIN — DEXAMETHASONE 0.5 MG: 0.5 TABLET ORAL at 08:17

## 2022-10-13 RX ADMIN — FOLIC ACID 1000 MCG: 1 TABLET ORAL at 08:17

## 2022-10-13 RX ADMIN — FERROUS SULFATE TAB 325 MG (65 MG ELEMENTAL FE) 325 MG: 325 (65 FE) TAB at 08:17

## 2022-10-13 RX ADMIN — SODIUM CHLORIDE, PRESERVATIVE FREE 10 ML: 5 INJECTION INTRAVENOUS at 21:21

## 2022-10-13 RX ADMIN — DOXAZOSIN 4 MG: 4 TABLET ORAL at 08:17

## 2022-10-13 RX ADMIN — FAMOTIDINE 20 MG: 20 TABLET ORAL at 08:17

## 2022-10-13 RX ADMIN — OXYCODONE AND ACETAMINOPHEN 2 TABLET: 5; 325 TABLET ORAL at 04:56

## 2022-10-13 RX ADMIN — OXYCODONE AND ACETAMINOPHEN 2 TABLET: 5; 325 TABLET ORAL at 19:31

## 2022-10-13 RX ADMIN — SODIUM CHLORIDE, PRESERVATIVE FREE 10 ML: 5 INJECTION INTRAVENOUS at 21:19

## 2022-10-13 RX ADMIN — OXYCODONE AND ACETAMINOPHEN 2 TABLET: 5; 325 TABLET ORAL at 10:22

## 2022-10-13 RX ADMIN — SENNOSIDES 8.6 MG: 8.6 TABLET, COATED ORAL at 21:17

## 2022-10-13 RX ADMIN — CLONIDINE HYDROCHLORIDE 0.3 MG: 0.2 TABLET ORAL at 13:53

## 2022-10-13 ASSESSMENT — PAIN DESCRIPTION - PAIN TYPE
TYPE: SURGICAL PAIN;ACUTE PAIN
TYPE: SURGICAL PAIN;ACUTE PAIN

## 2022-10-13 ASSESSMENT — PAIN DESCRIPTION - ONSET
ONSET: ON-GOING
ONSET: ON-GOING

## 2022-10-13 ASSESSMENT — PAIN SCALES - GENERAL
PAINLEVEL_OUTOF10: 8
PAINLEVEL_OUTOF10: 0
PAINLEVEL_OUTOF10: 6
PAINLEVEL_OUTOF10: 7
PAINLEVEL_OUTOF10: 0
PAINLEVEL_OUTOF10: 8
PAINLEVEL_OUTOF10: 7
PAINLEVEL_OUTOF10: 8
PAINLEVEL_OUTOF10: 1
PAINLEVEL_OUTOF10: 8

## 2022-10-13 ASSESSMENT — PAIN DESCRIPTION - LOCATION
LOCATION: RIB CAGE

## 2022-10-13 ASSESSMENT — PAIN DESCRIPTION - ORIENTATION
ORIENTATION: RIGHT

## 2022-10-13 ASSESSMENT — PAIN DESCRIPTION - DESCRIPTORS
DESCRIPTORS: ACHING;JABBING
DESCRIPTORS: ACHING
DESCRIPTORS: ACHING;DISCOMFORT
DESCRIPTORS: ACHING;DISCOMFORT

## 2022-10-13 ASSESSMENT — PAIN DESCRIPTION - FREQUENCY
FREQUENCY: CONTINUOUS
FREQUENCY: CONTINUOUS

## 2022-10-13 ASSESSMENT — PAIN SCALES - WONG BAKER
WONGBAKER_NUMERICALRESPONSE: 0

## 2022-10-13 NOTE — PROGRESS NOTES
Internal Medicine Resident Progress Note    Patient:  Selvin Sanchez    YOB: 1965  Unit/Bed:4K-14/014-A  MRN: [id number]    Acct: [de-identified]   PCP: Caroline Pappas MD    Date of Admission: 9/28/2022      Assessment/Plan:  Pleural effusion, right  - Multifactorial; likely secondary to hemodialysis noncompliance  - S/p chest tube 09/28, Chest tubes removed on 10/13  - S/p chemical pneumolysis 10/04 with tPA and dornase injection  - S/p bronchoscopy 10/06  - S/p 10/10 Right Thoracotomy & Decortication with Partial Lung Resection; Post operative chest tubes removed on 10/13  - 260 cc serosanguineous drainage in the last 24 hours  - Pleural studies indicating exudative effusion with cytology negative for malignant cells  - Lung resection biopsy showing  negative for malignancy  -CT surgery and pulmonology following    Acute hypoxic respiratory failure  - Patient maintaining adequate oxygenation on 2L; wean down to room air as tolerated; titrate to maintain SPO2 92 to 96%  - S/p chest tube placement 09/28, chest tube removed on 10/13  - Likely secondary to pleural effusion as well as Hx polysubstance abuse as well as poor lung expansion  - Patient encouraged to use incentive spirometer and Acapella  - S/p 10/10 Right Thoracotomy & Decortication with Partial Lung Resection; Multiple chest tubes in place post procedure  - Continue 0.5 mg Decadron per pulmonology    Trapped lung, right  - S/p chest tube 09/28, chest tube removed on 10/13  - S/p chemical pneumolysis 10/04 with tPA and dornase injection  - S/p bronchoscopy 10/06  - 260 cc serosanguineous drainage in the last 24 hours  - Pleural studies indicating exudative effusion with cytology negative for malignant cells  -S/p 10/10 Right Thoracotomy & Decortication with Partial Lung Resection  - Lung resection biopsy showing \"  Pleural fibrosis and fibrinous changes. Diffuse alveolar hemorrhage with abundant intra-alveolar hemosiderin laden macrophages. Negative for malignancy. \"  - Pain management per CT surgery    Right lower lobe infiltrate  - Noted on CTA of the chest on 10/02  - Associated with exudative pleural effusion  - S/p chest tube placement 09/28, chest tube removed on 10/13  - S/p chemical pneumolysis 10/04 with tPA dornase injection  - S/p bronchoscopy 10/06  - Doxycycline discontinued on 10/12; patient started on cefepime  - Incentive spirometry, bronchodilation per pulmonology recommendations  - Repeat imaging to assess for interval changes per pulmonology  - S/p 10/10 Right Thoracotomy & Decortication with Partial Lung Resection     ESRD, on hemodialysis  - Patient on MWF dialysis schedule  - Continue with low phosphorus and low potassium diet  - Nephrology following  - Judicious IV fluids and diuresis per nephrology recommendations  - Strict I's and O's  - S/p albumin infusion 10/07  - Patient's home sensipar and calcitriol resumed per nephrology      Hyperkalemia  - Currently on MWF dialysis schedule  - Continue low potassium diet  - Trend BMP    Systolic heart failure with preserved ejection fraction  - Echo 9/29 increased left ventricular wall thickness with EF 60%  - Strict I's and O's, daily weights  - Judicious IV fluids and diuresis per nephrology  - Continue Imdur  -10/10 dialysis with 2L fluid removal without complication    Hypertrophic cardiomyopathy  - Echo 9/29 showing severe left ventricular concentric hypertrophy as well as dilation of the left atrium  - Continue aspirin, statin, isosorbide mononitrate, verapamil, minoxidil  - Strict I's and O's, daily weights  - Judicious IV fluids and diuresis per nephrology    Primary HTN with current hypotension  - Continue to monitor vital signs  - Continue with clonidine, verapamil, minoxidil, and doxazosin regimen with hold parameters  -10/13 minoxidil held secondary to current hypotension per nephro recommendations    PTSD  - Continue sertraline, Atarax, trazodone  - Mood stable    Unspecified depressive disorder  - Continue sertraline  - Mood stable    GERD  - Continue PPI    Polysubstance abuse  - Patient counseled on substance abuse cessation    HLD  - Continue atorvastatin    Anemia of chronic disease  - Trend CBC  - Continue iron and folate supplementation    REZA  - Patient tolerating BiPAP at night  - Recommendation for outpatient follow-up for PAP titration with sleep clinic    Thoracic spondylosis  - Noted on CTA of the chest on 10/02  - PT/OT consulted; 10/05 [facility] 15     Hyperphosphatemia, resolved  - Currently on MWF dialysis schedule  - Continue low phosphorus diet  - Patient's home phosphorus binder resumed  - Trend phosphorus and BMP      Right pneumothorax, apical, resolved  Pneumothorax, right lower lobe, right, resolved   Apical pneumothorax, right, resolved  Hx Thoracic Aneurysm  Hx AAA s/p aortic stent graft 7/2018  Hx COVID-19 pneumonia  Hx atrial flutter  Hx peripheral vascular disease  Hx hypertensive emergency  - Noted    Expected discharge date: 10/11/2022    Disposition:   [x] Home  [] TCU  [] Rehab  [] Psych  [] SNF  [] [place]  [] Other-    ===================================================================      Chief Complaint: Shortness of breath    Hospital Course: This is a 79-year-old -American male who presented to [address] [place] [address] on 09/28/2022 with worsening shortness of breath at rest and with exertion. CXR showed large right pleural effusion and patient required PAP therapy for adequate oxygenation. Patient was admitted and chest tube placed on 09/28 with 2 L of serosanguineous fluid drainage. Dialysis also performed at 09/28 with 2 L of fluid. Repeat dialysis performed on 09/29 with albumin administration. Repeat dialysis performed on 09/30/2022. Pleural fluid analysis was consistent with exudative effusion with negative cytology.   Repeat CTA of the chest showed improvement in the right-sided pleural effusion, but showed evidence of a right apical pneumothorax which resolved on subsequent imaging. Repeat dialysis performed on 10/03 with 2 L removed. Dornase and alteplase administered through chest tube on 10/04 by intensivist.  Hemodialysis performed on 10/05 with 2 L of fluid removed. Bronchoscopy performed on 57/67 without complication. There was evidence of chronic inflammation with pitting airway disease with no mass identified. Dialysis performed on 10/07 with 0.5 L of fluid removed. Dialysis performed on 10/10 with 2L fluid removed. Right thoracotomy with decortication and partial lung resection was performed without complication. Biopsies performed showed \"Pleural fibrosis and fibrinous changes. Diffuse alveolar hemorrhage with abundant intra-alveolar hemosiderin laden macrophages. Negative for malignancy. \" Dialysis performed on 10/12/2022 with 2L removed. Chest tube removed on 81/95/4628 without complication. Subjective (past 24 hours):   Patient seen and evaluated at the bedside. He denies chest pain, fever, chills, nausea, vomiting, and diarrhea. Chest tube was removed without complication and he denies any difficulty with respiration. He denies any difficulty with eating or ambulation. ROS: reviewed complete ROS unchanged unless otherwise stated in hospital course/subjective portion.        Medications:  Reviewed    Infusion Medications    sodium chloride      sodium chloride       Scheduled Medications    [START ON 10/14/2022] calcitRIOL  2.25 mcg Oral Once per day on Mon Wed Fri    [START ON 10/14/2022] cinacalcet  150 mg Oral Once per day on Mon Wed Fri    epoetin qiana-epbx  2,000 Units SubCUTAneous Once per day on Mon Wed Fri    And    epoetin qiana-epbx  2,000 Units SubCUTAneous Once per day on Mon Wed Fri    doxazosin  4 mg Oral 2 times per day    isosorbide mononitrate  60 mg Oral BID    cefepime  1,000 mg IntraVENous Q24H    sodium chloride flush  5-40 mL IntraVENous 2 times per day    [Held by provider] minoxidil  2.5 mg Oral BID    ketamine  100 mg IntraVENous Once    dexamethasone  0.5 mg Oral Daily    docusate sodium  100 mg Oral BID    senna  1 tablet Oral Nightly    aspirin  81 mg Oral Daily    atorvastatin  40 mg Oral Nightly    cloNIDine  0.3 mg Oral TID    famotidine  20 mg Oral Daily    ferrous sulfate  325 mg Oral Daily with breakfast    folic acid  5,946 mcg Oral Daily    fluticasone  1 spray Nasal Daily    sertraline  100 mg Oral Daily    verapamil  240 mg Oral Daily    multivitamin  1 tablet Oral Daily    sodium chloride flush  10 mL IntraVENous 2 times per day     PRN Meds: sodium chloride flush, sodium chloride, nicotine polacrilex, oxyCODONE-acetaminophen **OR** oxyCODONE-acetaminophen, morphine **OR** morphine, promethazine **OR** ondansetron, albuterol sulfate HFA, hydrOXYzine HCl, traZODone, sodium chloride flush, sodium chloride, ondansetron **OR** [DISCONTINUED] ondansetron, polyethylene glycol, acetaminophen, morphine        Intake/Output Summary (Last 24 hours) at 10/13/2022 0954  Last data filed at 10/12/2022 2208  Gross per 24 hour   Intake 700 ml   Output 2660 ml   Net -1960 ml       Exam:  BP (!) 109/52   Pulse 89   Temp 97 °F (36.1 °C) (Axillary)   Resp 20   Ht 6' 3\" (1.905 m)   Wt 198 lb 6.6 oz (90 kg)   SpO2 97%   BMI 24.80 kg/m²     General: No distress, appears stated age. Eyes:  PERRL. Conjunctivae/corneas clear. HENT: Head normal appearing. Nares normal. Oral mucosa moist.  Hearing intact. Neck: Supple, with full range of motion. Trachea midline. No gross JVD appreciated. Respiratory: Normal respiratory effort. No wheezes, crackles, or rhonchi. S/P chest tube removal with no evidence of crepitus, discoloration, or discharge. Nasal cannula in place. Cardiovascular: Normal rate, regular rhythm with normal S1/S2 without murmurs. No lower extremity edema. Abdomen: Soft, non-tender, non-distended with normal bowel sounds.   Musculoskeletal: No joint swelling or tenderness. Normal tone. No abnormal movements. Protuberance associated with dialysis port in left arm  Skin: Warm and dry. No rashes or lesions. Neurologic:  No focal sensory/motor deficits in the upper or lower extremities. Cranial nerves:  grossly non-focal 2-12. Psychiatric: Alert and oriented, normal insight and thought content. Capillary Refill: Brisk,< 3 seconds. Peripheral Pulses: +2 palpable, equal bilaterally. Labs:   Recent Labs     10/11/22  0336 10/12/22  0451 10/13/22  0628   WBC 14.2* 7.8 8.0   HGB 11.0* 9.0* 9.1*   HCT 36.0* 29.5* 29.5*    125* 134     Recent Labs     10/11/22  0336 10/11/22  1129 10/11/22  1811 10/12/22  0451 10/13/22  0628     --   --  136 138   K 5.7*   < > 4.9 4.5 4.5     --   --  99 101   CO2 25  --   --  23 27   BUN 37*  --   --  55* 40*   CREATININE 7.2*  --   --  10.0* 7.0*   CALCIUM 9.0  --   --  8.3* 9.0   PHOS  --   --   --  4.2  --     < > = values in this interval not displayed. No results for input(s): AST, ALT, BILIDIR, BILITOT, ALKPHOS in the last 72 hours. No results for input(s): INR in the last 72 hours. No results for input(s): [place] Daphne in the last 72 hours. No results for input(s): PROCAL in the last 72 hours. Lab Results   Component Value Date/Time    NITRU NEGATIVE 04/03/2018 07:45 PM    WBCUA 2-4 04/03/2018 07:45 PM    BACTERIA NONE 04/03/2018 07:45 PM    RBCUA 5-10 04/03/2018 07:45 PM    BLOODU SMALL 04/03/2018 07:45 PM    SPECGRAV 1.014 03/07/2018 09:10 PM    GLUCOSEU NEGATIVE 04/03/2018 07:45 PM       Radiology (48 hours):  XR CHEST PORTABLE    Result Date: 10/5/2022  Small pneumothorax at the right lung base. Chest tube tip is at the right lung base, grossly unchanged. Small right pleural effusion, decreased. Right lung base consolidation, increased, suspicious for atelectasis or pneumonia. Cardiomegaly.  This document has been electronically signed by: Ruben Sharma MD on 10/05/2022 03:41 AM DVT prophylaxis:    [] Lovenox  [] SCDs  [x] SQ Heparin  [] Encourage ambulation   [] Already on Anticoagulation       Diet: ADULT DIET; Regular; 5 carb choices (75 gm/meal);  Low Sodium (2 gm)  Code Status: Full Code  PT/OT: Consulted      Electronically signed by Jean Mcguire MD on 10/13/2022 at 9:54 AM    Case was discussed with Attending, Dr. Nighat Gardiner

## 2022-10-13 NOTE — PROGRESS NOTES
[facility]   Nephrology progress note  10/13/2022, 9:25 AM      Pt Name:    Zeke Alvarez  MRN:     [id number]     YOB: 1965  Admit Date:    9/28/2022  9:56 AM    Chief Complaint: Nephrology following for ESRD. Subjective:  Patient was seen and examined this morning . Feels ok. On 2 L NC. Bps are soft. Objective:  24HR INTAKE/OUTPUT:    Intake/Output Summary (Last 24 hours) at 10/13/2022 0925  Last data filed at 10/12/2022 2208  Gross per 24 hour   Intake 700 ml   Output 2660 ml   Net -1960 ml         I/O last 3 completed shifts: In: 1140 [P.O.:740]  Out: 2766 [Chest Tube:366]  No intake/output data recorded.    Admission weight: 250 lb (113.4 kg)  Wt Readings from Last 3 Encounters:   10/12/22 198 lb 6.6 oz (90 kg)   09/13/21 250 lb (113.4 kg)   07/26/21 240 lb (108.9 kg)        Vitals :   Vitals:    10/13/22 0322 10/13/22 0453 10/13/22 0800 10/13/22 0853   BP: (!) 95/55 (!) 98/54 (!) 109/52    Pulse: 83  89    Resp: 18  20    Temp: 98.5 °F (36.9 °C)  97 °F (36.1 °C)    TempSrc: Oral  Axillary    SpO2: 93%  96% 97%   Weight:       Height:           Physical examination  General Appearance: alert and cooperative with exam, appears comfortable, no distress  Mouth/Throat: Oral mucosa moist  Neck: No JVD  Lungs: diminished, right sided chest tube  Heart:  S1, S2 heard  GI: soft, non-tender  Extremities: no significant LE edema, left arm AV fistula    Medications:  Infusion:    sodium chloride      sodium chloride       Meds:    [START ON 10/14/2022] calcitRIOL  2.25 mcg Oral Once per day on Mon Wed Fri    [START ON 10/14/2022] cinacalcet  150 mg Oral Once per day on Mon Wed Fri    epoetin qiana-epbx  2,000 Units SubCUTAneous Once per day on Mon Wed Fri    And    epoetin qiana-epbx  2,000 Units SubCUTAneous Once per day on Mon Wed Fri    doxazosin  4 mg Oral 2 times per day    isosorbide mononitrate  60 mg Oral BID    cefepime  1,000 mg IntraVENous Q24H    sodium chloride flush 5-40 mL IntraVENous 2 times per day    [Held by provider] minoxidil  2.5 mg Oral BID    ketamine  100 mg IntraVENous Once    dexamethasone  0.5 mg Oral Daily    docusate sodium  100 mg Oral BID    senna  1 tablet Oral Nightly    aspirin  81 mg Oral Daily    atorvastatin  40 mg Oral Nightly    cloNIDine  0.3 mg Oral TID    famotidine  20 mg Oral Daily    ferrous sulfate  325 mg Oral Daily with breakfast    folic acid  5,753 mcg Oral Daily    fluticasone  1 spray Nasal Daily    sertraline  100 mg Oral Daily    verapamil  240 mg Oral Daily    multivitamin  1 tablet Oral Daily    sodium chloride flush  10 mL IntraVENous 2 times per day     Meds prn: sodium chloride flush, sodium chloride, nicotine polacrilex, oxyCODONE-acetaminophen **OR** oxyCODONE-acetaminophen, morphine **OR** morphine, promethazine **OR** ondansetron, albuterol sulfate HFA, hydrOXYzine HCl, traZODone, sodium chloride flush, sodium chloride, ondansetron **OR** [DISCONTINUED] ondansetron, polyethylene glycol, acetaminophen, morphine     Lab Data :  CBC:   Recent Labs     10/11/22  0336 10/12/22  0451 10/13/22  0628   WBC 14.2* 7.8 8.0   HGB 11.0* 9.0* 9.1*   HCT 36.0* 29.5* 29.5*    125* 134     CMP:  Recent Labs     10/11/22  0336 10/11/22  1129 10/11/22  1811 10/12/22  0451 10/13/22  0628     --   --  136 138   K 5.7*   < > 4.9 4.5 4.5     --   --  99 101   CO2 25  --   --  23 27   BUN 37*  --   --  55* 40*   CREATININE 7.2*  --   --  10.0* 7.0*   GLUCOSE 135*  --   --  97 95   CALCIUM 9.0  --   --  8.3* 9.0   MG  --   --   --  2.4  --    PHOS  --   --   --  4.2  --     < > = values in this interval not displayed. Hepatic:   No results for input(s): LABALBU, AST, ALT, ALB, BILITOT, ALKPHOS in the last 72 hours. Assessment and Plan:    1. ESRD on HD   -MWF while inpatient        2. Hyperkalemia : improved  3. HTN:running lower, will hold minoxidil  4. Loculated pleural effusion: s/p thoracotomy   5.  Anemia in CKD : continue KENYETTA  6. Secondary hyperparathyroidism: restart home Sensipar and Calcitriol  7. Chronic fluid overload    D/W patient     Yvette Herrera DO  [facility]    This report has been created using voice recognition software.  It may contain minor errors which are inherent in voice recognition technology

## 2022-10-13 NOTE — PROGRESS NOTES
[facility]    Patient - Zeke Alvarez,  Age - 56 y.o.    - 1965      Room Number - 4K-14/014-A   N -  [id number]   [facility]  Date of Admission -  2022  9:56 AM  Hospital Day - 15  Chief Complaint   Shortness of breath  HPI   Zeke Alvarez is a 56 y.o. male with PMHx of AAA (with repair), ESRD on HD, NIDDM2, hyperparathyroidism with worsening shortness of breath at rest and with exertion. Chest x-ray demonstrated large right pleural effusion and the patient required BiPAP therapy for an adequate oxygenation. On admission a right-sided chest tube was placed with 2 L of serosanguineous fluid drainage and pleural fluid analysis consistent with exudative process and negative cytology. Repeat CTA of the chest demonstrated improvement of the right-sided pleural effusion with evidence of right apical pneumothorax. The patient underwent right-sided chemical pneumolysis on 10/4/2022, bronchoscopy on 4241 without complication, and right-sided thoracotomy with decortication and wedge resection on 1480 with Dr. Saulo Damon.    The patient has been also undergoing hemodialysis 3 times weekly without complication for his ESRD. Past 24 hours   -CXR this AM no PTX   -CT output noted 10 cc from #1 and 250 cc from #2   -Reports ongoing pain with deep inhalation and cough to right chest near CT insertion site more tolerable than previous  -Demonstrates use of IS and acapella reports using multiple times Daily   All other systems reviewed   Objective    Vitals   height is 6' 3\" (1.905 m) and weight is 198 lb 6.6 oz (90 kg). His oral temperature is 98 °F (36.7 °C). His blood pressure is 104/54 (abnormal) and his pulse is 88. His respiration is 18 and oxygen saturation is 97%. Body mass index is 24.8 kg/m².   O2 Flow Rate (L/min): 2 L/min  I/O    Intake/Output Summary (Last 24 hours) at 10/13/2022 1150  Last data filed at 10/12/2022 2208  Gross per 24 hour   Intake 700 ml   Output 2660 ml   Net -1960 ml       I/O last 3 completed shifts: In: 1140 [P.O.:740]  Out: 2766 [Chest Tube:366]   Patient Vitals for the past 96 hrs (Last 3 readings):   Weight   10/12/22 1205 198 lb 6.6 oz (90 kg)   10/12/22 0735 202 lb 13.2 oz (92 kg)   10/12/22 0428 216 lb (98 kg)       Exam    Physical Exam   Constitutional: No distress on O2 per NC. Patient appears moderately built and  moderately nourished. Head: Normocephalic and atraumatic. Mouth/Throat: Oropharynx is clear and moist.  No oral thrush. Eyes: Conjunctivae are normal. Pupils are equal, round. No scleral icterus. Neck: Neck supple. No tracheal deviation present. Cardiovascular: S1 and S2 with no murmur. No peripheral edema  Pulmonary/Chest: Normal effort with bilateral air entry, clear breath sounds. No stridor. No respiratory distress. CT x 3 noted to right CS attached to atrium with sanguinous drainage in collection chamber no visible air leak. Abdominal: Soft. Bowel sounds audible. No distension or tenderness to palp.    Musculoskeletal: Moves all extremities  Neurological: Patient is alert and follows simple commands     Meds       [START ON 10/14/2022] calcitRIOL  2.25 mcg Oral Once per day on Mon Wed Fri    [START ON 10/14/2022] cinacalcet  150 mg Oral Once per day on Mon Wed Fri    epoetin qiana-epbx  2,000 Units SubCUTAneous Once per day on Mon Wed Fri    And    epoetin qiana-epbx  2,000 Units SubCUTAneous Once per day on Mon Wed Fri    doxazosin  4 mg Oral 2 times per day    isosorbide mononitrate  60 mg Oral BID    cefepime  1,000 mg IntraVENous Q24H    sodium chloride flush  5-40 mL IntraVENous 2 times per day    [Held by provider] minoxidil  2.5 mg Oral BID    ketamine  100 mg IntraVENous Once    dexamethasone  0.5 mg Oral Daily    docusate sodium  100 mg Oral BID    senna  1 tablet Oral Nightly    aspirin  81 mg Oral Daily    atorvastatin  40 mg Oral Nightly    cloNIDine  0.3 mg Oral TID famotidine  20 mg Oral Daily    ferrous sulfate  325 mg Oral Daily with breakfast    folic acid  5,431 mcg Oral Daily    fluticasone  1 spray Nasal Daily    sertraline  100 mg Oral Daily    verapamil  240 mg Oral Daily    multivitamin  1 tablet Oral Daily    sodium chloride flush  10 mL IntraVENous 2 times per day      sodium chloride      sodium chloride       sodium chloride flush, sodium chloride, nicotine polacrilex, oxyCODONE-acetaminophen **OR** oxyCODONE-acetaminophen, morphine **OR** morphine, promethazine **OR** ondansetron, albuterol sulfate HFA, hydrOXYzine HCl, traZODone, sodium chloride flush, sodium chloride, ondansetron **OR** [DISCONTINUED] ondansetron, polyethylene glycol, acetaminophen, morphine  Lab   ABG  Lab Results   Component Value Date/Time    PH 7.27 09/28/2022 03:20 PM    PO2 101 09/28/2022 03:20 PM    PCO2 64 09/28/2022 03:20 PM    HCO3 29 09/28/2022 03:20 PM    O2SAT 97 09/28/2022 03:20 PM     Lab Results   Component Value Date/Time    IFIO2 40 09/28/2022 03:20 PM    MODE CPAP 03/31/2016 06:54 AM    SETPEEP 6.0 09/28/2022 03:20 PM     CBC  Recent Labs     10/11/22  0336 10/12/22  0451 10/13/22  0628   WBC 14.2* 7.8 8.0   RBC 3.50* 2.87* 2.84*   HGB 11.0* 9.0* 9.1*   HCT 36.0* 29.5* 29.5*   .9* 102.8* 103.9*   MCH 31.4 31.4 32.0   MCHC 30.6* 30.5* 30.8*    125* 134   MPV 9.4 9.9 9.8        BMP  Recent Labs     10/11/22  0336 10/11/22  1129 10/11/22  1811 10/12/22  0451 10/13/22  0628     --   --  136 138   K 5.7*   < > 4.9 4.5 4.5     --   --  99 101   CO2 25  --   --  23 27   BUN 37*  --   --  55* 40*   CREATININE 7.2*  --   --  10.0* 7.0*   GLUCOSE 135*  --   --  97 95   MG  --   --   --  2.4  --    PHOS  --   --   --  4.2  --    CALCIUM 9.0  --   --  8.3* 9.0    < > = values in this interval not displayed. LFT  No results for input(s): AST, ALT, ALB, BILITOT, ALKPHOS, LIPASE in the last 72 hours. Invalid input(s):   AMYLASE  TROP  Lab Results Component Value Date/Time    TROPONINT 0.054 09/28/2022 10:00 AM    TROPONINT 0.074 09/02/2020 03:30 AM    TROPONINT 0.059 07/27/2020 03:00 PM     BNP  No results for input(s): BNP in the last 72 hours. Lactic Acid  No results for input(s): LACTA in the last 72 hours. INR  No results for input(s): INR, PROTIME in the last 72 hours. PTT  No results for input(s): APTT in the last 72 hours. Glucose  No results for input(s): POCGLU in the last 72 hours. UA No results for input(s): SPECGRAV, PHUR, COLORU, CLARITYU, MUCUS, PROTEINU, BLOODU, RBCUA, WBCUA, BACTERIA, NITRU, GLUCOSEU, BILIRUBINUR, UROBILINOGEN, KETUA, LABCAST, LABCASTTY, AMORPHOS in the last 72 hours. Invalid input(s): CRYSTALS. EKG   10/1/22  alternating sinus and junctional rhythm with occasional PVCs    Left axis deviation  LVH ( Sokolow-Marte , [facility] product , Romhilt-Rueda )  Inferior infarct (cited on or before 28-SEP-2022)  Abnormal ECG  When compared with ECG of 28-SEP-2022 10:07,  Significant changes have occurred  Confirmed by Alvarez Deutsch ([id number]) on 10/2/2022 1:04:21 PM  Echo   9/29/22  Summary   Left ventricle size is normal.   Severely increased left ventricle wall thickness. Severe concentric left ventricular hypertrophy. Systolic function was normal.   Ejection fraction is visually estimated at 60%. The left atrium is Moderately dilated. Moderately enlarged right atrium size. Mild systolic anterior motion (CASSIE) of anterior leaflet. Moderate LVOT Obstruction   Moderately elevated left ventricular outflow tract velocity 3 m/sec . Moderately elevated Peak left ventricular outflow tract gradient of 36 mmhg .       Signature      ----------------------------------------------------------------   Electronically signed by Griselda Chris MD (Interpreting   physician) on 09/29/2022 at 06:56 PM   ----------------------------------------------------------------  Cultures   Procalcitonin   Lab Results   Component Value Date/Time PROCAL 0.27 07/06/2020 08:22 PM    PROCAL 0.38 03/06/2018 10:46 PM    PROCAL 1.47 09/28/2017 06:18 PM     Blood Cx (9/28/22) - NGTD  Pleural Fluid Fungus (9/29/22) - No fungus or yeast observed  Pleural fluid culture (9/28/22) - Rare segmented neutrophils observed  Molecular Pneumonia Panel (10/6/22) - No organisms detected  Respiratory Culture (10/6/22) - Moderate growth Citrobacter koseri  AFB culture and smear (10/10/22) - No growth for 10 days  Anaerobic and Aerobic lung fluid (10/10/22) - rare segmented neutrophils observed. Radiology   CXR    1V chest   10/13/2022   Findings: Mediastinum: Stable cardiac silhouette enlargement. Thoracic aortic stent stable. Lungs: Mild improvement right mid and basilar consolidations. Pleura: No pneumothorax or significant effusion. Right chest tubes stable. Bones: No acute pathology. Impression:   No residual pneumothorax visible. Mild improvement right mid and basilar consolidations. Chest X-ray     10/12/2022     Impression:   1. Small residual right pneumothorax, decreased since the prior study. 2. Bilateral pleural effusions. Right basilar airspace consolidation. Findings similar to previous study. CT Scans    CTA CHEST W WO CONTRAST     10/2/2022     FINDINGS: The patient is status post endoluminal graft placement for an aortic dissection  The caliber of the descending thoracic aorta is slightly smaller than on remote study dated 26th of February 2020. There is no definite evidence for an acute endoleak. There is aortic dissection extending into the abdominal aorta. There is cardiomegaly with mitral valve calcification. There is a right-sided chest tube at the base of the right hemithorax. There is a small pneumothorax. There is a small pleural effusion and infiltrate in the right lower lobe. There is mild prominence of the main, right and left pulmonary arteries. This may represent pulmonary hypertension.   There is evidence for old granulomatous disease in the right lower lobe, right hilum and mediastinum. There are small kidneys present bilaterally. Impression:  1. Endoluminal graft in the thoracic aorta. This appears slightly smaller than on remote study dated 2/26/2020. There is no definite evidence of an endoleak. Please correlate clinically. 2. Cardiomegaly. Mitral valve calcification. 3. Right-sided chest tube. Small pneumothorax and effusion at the right lung base. Right lower lobe infiltrate. 4. Evidence for granulomatous disease in the right lower lobe, right hilum and mediastinum. 5. Thoracic spondylosis. 6. Old granulomatous disease in the spleen. 7. Small kidneys bilaterally. (See actual reports for details)    Assessment   -Right side loculated pleural effusion - Multifactorial. S/p chemical pneumolysis on 10/4/22, bronchoscopy on 10/6/22. Exudative pleural studies with negative cytology for malignancy.  -Entrapped right lung - S/p chemical pneumolysis on 10/4/22, bronchoscopy on 10/6/22 and right thoracotomy with decortication and wedge resection 10/10/2022. 3 large bore right chest tube in placed. -Right apical pneumothorax- s/p right thoracotomy with decortication and multiple wedge resections  -Right lower lobe pneumonia-bronchoscopy on 10/6/22 Respiratory culture (+) moderate growth Citrobacter koseri.  -Acute hypoxic respiratory failure - Secondary to above.  Patient does not use supplemental oxygen at home  -Hx of tobacco smoking   -ESRD on HD - M,W,F dialysis  -HFpEF- ECHO 9/29/22 EF=60% with increased LV wall thickness.  -Hx of AAA - s/p aortic stent graft 7/2018  Plan   -Monitor SpO2 wean supplemental O2 to maintain SpO2 >90%  -Cefepime with Pharmacy to dose based on kidney function   -CTS following with Daily CXR CT management per CTS, per Dr. Ardyce Hodgkins note planning removal today  -Follow surgical pathology  -Pulmonary hygiene with Acapella and IS   -Explained splinting with pillow/towel for coughing     Case discussed with nurse and patient  Questions and concerns addressed. Meds and orders reviewed. Electronically signed by   WADE Whitney CNP on 10/13/2022 at 11:50 AM    Addendum by Dr. Sabina Nash MD:  Patient seen by me independently including key components of medical care. Face to face evaluation and examination was performed. Case discussed with Mr. Bergeron MEL Nuno  Italicized font, if present,  represents changes to the note made by me. More than 50% of the encounter time involved with patient care by the Pulmonary & Critical care service team spent by me .   Please see my modifications mentioned below:  He is on 2 L of oxygen and nasal cannula  There is no air leak in both chest tubes on right side of the chest  Patient educated about my impression plan    Electronically signed by   Jolie Munson MD on 10/13/2022 at 7:48 PM

## 2022-10-13 NOTE — PLAN OF CARE
Problem: Discharge Planning  Goal: Discharge to home or other facility with appropriate resources  10/12/2022 0310 by Hesham Beauchamp RN  Outcome: Progressing  Flowsheets (Taken 10/12/2022 0310)  Discharge to home or other facility with appropriate resources:   Identify barriers to discharge with patient and caregiver   Arrange for needed discharge resources and transportation as appropriate   Identify discharge learning needs (meds, wound care, etc)   Refer to discharge planning if patient needs post-hospital services based on physician order or complex needs related to functional status, cognitive ability or social support system     Problem: Pain  Goal: Verbalizes/displays adequate comfort level or baseline comfort level  10/12/2022 0310 by Hesham Beauchamp RN  Outcome: Progressing  Flowsheets (Taken 10/12/2022 0310)  Verbalizes/displays adequate comfort level or baseline comfort level:   Encourage patient to monitor pain and request assistance   Assess pain using appropriate pain scale   Administer analgesics based on type and severity of pain and evaluate response   Implement non-pharmacological measures as appropriate and evaluate response   Notify Licensed Independent Practitioner if interventions unsuccessful or patient reports new pain     Problem: Safety - Adult  Goal: Free from fall injury  10/12/2022 0310 by Hesham Beauchamp RN  Outcome: Progressing  Flowsheets (Taken 10/12/2022 0310)  Free From Fall Injury: Instruct family/caregiver on patient safety     Problem: ABCDS Injury Assessment  Goal: Absence of physical injury  10/12/2022 0310 by Hesham Beauchamp RN  Outcome: Progressing  Flowsheets (Taken 10/12/2022 0310)  Absence of Physical Injury: Implement safety measures based on patient assessment     Problem: Respiratory - Adult  Goal: Achieves optimal ventilation and oxygenation  10/12/2022 0310 by Hesham Beauchamp RN  Outcome: Progressing  Flowsheets (Taken 10/12/2022 0310)  Achieves optimal ventilation and oxygenation:   Assess for changes in respiratory status   Assess for changes in mentation and behavior   Position to facilitate oxygenation and minimize respiratory effort   Oxygen supplementation based on oxygen saturation or arterial blood gases   Encourage broncho-pulmonary hygiene including cough, deep breathe, incentive spirometry   Assess the need for suctioning and aspirate as needed   Assess and instruct to report shortness of breath or any respiratory difficulty   Respiratory therapy support as indicated     Problem: Cardiovascular - Adult  Goal: Maintains optimal cardiac output and hemodynamic stability  10/12/2022 0310 by Amarilis Tracy RN  Outcome: Progressing  Flowsheets (Taken 10/12/2022 0310)  Maintains optimal cardiac output and hemodynamic stability:   Monitor blood pressure and heart rate   Monitor urine output and notify Licensed Independent Practitioner for values outside of normal range   Assess for signs of decreased cardiac output     Problem: Cardiovascular - Adult  Goal: Absence of cardiac dysrhythmias or at baseline  Outcome: Progressing  Flowsheets (Taken 10/12/2022 0310)  Absence of cardiac dysrhythmias or at baseline: Monitor cardiac rate and rhythm     Problem: Skin/Tissue Integrity - Adult  Goal: Skin integrity remains intact  10/12/2022 0310 by Amarilis Tracy RN  Outcome: Progressing  Flowsheets (Taken 10/12/2022 0310)  Skin Integrity Remains Intact: Monitor for areas of redness and/or skin breakdown     Problem: Skin/Tissue Integrity - Adult  Goal: Incisions, wounds, or drain sites healing without S/S of infection  Outcome: Progressing  Flowsheets (Taken 10/12/2022 0310)  Incisions, Wounds, or Drain Sites Healing Without Sign and Symptoms of Infection:   ADMISSION and DAILY: Assess and document risk factors for pressure ulcer development   TWICE DAILY: Assess and document skin integrity   TWICE DAILY: Assess and document dressing/incision, wound bed, drain sites and surrounding tissue     Problem: Skin/Tissue Integrity - Adult  Goal: Oral mucous membranes remain intact  Outcome: Progressing  Flowsheets (Taken 10/12/2022 0310)  Oral Mucous Membranes Remain Intact:   Assess oral mucosa and hygiene practices   Implement preventative oral hygiene regimen     Problem: Infection - Adult  Goal: Absence of infection at discharge  10/12/2022 0310 by Zachary Covington RN  Outcome: Progressing  Flowsheets (Taken 10/12/2022 0310)  Absence of infection at discharge:   Assess and monitor for signs and symptoms of infection   Monitor lab/diagnostic results   Monitor all insertion sites i.e., indwelling lines, tubes and drains   [place] appropriate cooling/warming therapies per order     Problem: Infection - Adult  Goal: Absence of infection during hospitalization  Outcome: Progressing  Flowsheets (Taken 10/12/2022 0310)  Absence of infection during hospitalization:   Assess and monitor for signs and symptoms of infection   Monitor lab/diagnostic results   Monitor all insertion sites i.e., indwelling lines, tubes and drains     Problem: Infection - Adult  Goal: Absence of fever/infection during anticipated neutropenic period  Outcome: Progressing     Problem: Metabolic/Fluid and Electrolytes - Adult  Goal: Electrolytes maintained within normal limits  10/12/2022 0310 by Zachary Covington RN  Outcome: Progressing  Flowsheets (Taken 10/12/2022 0310)  Electrolytes maintained within normal limits:   Monitor labs and assess patient for signs and symptoms of electrolyte imbalances   Administer electrolyte replacement as ordered   Monitor response to electrolyte replacements, including repeat lab results as appropriate     Problem: Metabolic/Fluid and Electrolytes - Adult  Goal: Hemodynamic stability and optimal renal function maintained  Outcome: Progressing  Flowsheets (Taken 10/12/2022 0310)  Hemodynamic stability and optimal renal function maintained:   Monitor labs and assess for signs and symptoms of volume excess or deficit   Monitor intake, output and patient weight   Monitor urine specific gravity, serum osmolarity and serum sodium as indicated or ordered   Monitor response to interventions for patient's volume status, including labs, urine output, blood pressure (other measures as available)     Problem: Metabolic/Fluid and Electrolytes - Adult  Goal: Glucose maintained within prescribed range  Outcome: Progressing  Flowsheets (Taken 10/12/2022 0310)  Glucose maintained within prescribed range:   Monitor blood glucose as ordered   Assess for signs and symptoms of hyperglycemia and hypoglycemia     Problem: Hematologic - Adult  Goal: Maintains hematologic stability  10/12/2022 0310 by Renetta Sotelo RN  Outcome: Progressing  Flowsheets (Taken 10/12/2022 0310)  Maintains hematologic stability: Assess for signs and symptoms of bleeding or hemorrhage     Problem: Skin/Tissue Integrity  Goal: Absence of new skin breakdown  Description: 1. Monitor for areas of redness and/or skin breakdown  2. Assess vascular access sites hourly  3. Every 4-6 hours minimum:  Change oxygen saturation probe site  4. Every 4-6 hours:  If on nasal continuous positive airway pressure, respiratory therapy assess nares and determine need for appliance change or resting period. Outcome: Progressing  Note: No new skin lesions noted this shift. Patient encouraged to reposition every two hours. Skin assessments completed and ongoing.         Problem: Chronic Conditions and Co-morbidities  Goal: Patient's chronic conditions and co-morbidity symptoms are monitored and maintained or improved  10/12/2022 0310 by Renetta Sotelo RN  Outcome: Progressing  Flowsheets (Taken 10/12/2022 0310)  Care Plan - Patient's Chronic Conditions and Co-Morbidity Symptoms are Monitored and Maintained or Improved: Monitor and assess patient's chronic conditions and comorbid symptoms for stability, deterioration, or improvement     Problem: Nutrition Deficit:  Goal: Optimize nutritional status  Outcome: Progressing  Flowsheets (Taken 10/12/2022 0310)  Nutrient intake appropriate for improving, restoring, or maintaining nutritional needs:   Assess nutritional status and recommend course of action   Monitor oral intake, labs, and treatment plans     Problem: Neurosensory - Adult  Goal: Achieves maximal functionality and self care  Outcome: Progressing  Flowsheets (Taken 10/12/2022 0310)  Achieves maximal functionality and self care:   Monitor swallowing and airway patency with patient fatigue and changes in neurological status   Encourage and assist patient to increase activity and self care with guidance from physical therapy/occupational therapy     Problem: Neurosensory - Adult  Goal: Achieves stable or improved neurological status  Outcome: Progressing  Flowsheets (Taken 10/12/2022 0310)  Achieves stable or improved neurological status:   Assess for and report changes in neurological status   Initiate measures to prevent increased intracranial pressure   Maintain blood pressure and fluid volume within ordered parameters to optimize cerebral perfusion and minimize risk of hemorrhage   Monitor temperature, glucose, and sodium.  Initiate appropriate interventions as ordered     Problem: Musculoskeletal - Adult  Goal: Return mobility to safest level of function  10/12/2022 0310 by Brandan Moya RN  Outcome: Progressing  Flowsheets (Taken 10/12/2022 0310)  Return Mobility to Safest Level of Function:   Assess patient stability and activity tolerance for standing, transferring and ambulating with or without assistive devices   Assist with transfers and ambulation using safe patient handling equipment as needed   Ensure adequate protection for wounds/incisions during mobilization   Obtain physical therapy/occupational therapy consults as needed     Problem: Musculoskeletal - Adult  Goal: Maintain proper alignment of affected body part  Outcome: Progressing  Flowsheets (Taken 10/12/2022 0310)  Maintain proper alignment of affected body part:   Support and protect limb and body alignment per provider's orders   Instruct and reinforce with patient and family use of appropriate assistive device and precautions (e.g. spinal or hip dislocation precautions)     Problem: Musculoskeletal - Adult  Goal: Return ADL status to a safe level of function  Outcome: Progressing  Flowsheets (Taken 10/12/2022 0310)  Return ADL Status to a Safe Level of Function:   Administer medication as ordered   Assess activities of daily living deficits and provide assistive devices as needed   Obtain physical therapy/occupational therapy consults as needed     Problem: Gastrointestinal - Adult  Goal: Minimal or absence of nausea and vomiting  10/12/2022 0310 by Oscar Brooks RN  Outcome: Progressing  Flowsheets (Taken 10/12/2022 0310)  Minimal or absence of nausea and vomiting:   Administer IV fluids as ordered to ensure adequate hydration   Provide nonpharmacologic comfort measures as appropriate     Problem: Gastrointestinal - Adult  Goal: Maintains or returns to baseline bowel function  Outcome: Progressing  Flowsheets (Taken 10/12/2022 0310)  Maintains or returns to baseline bowel function:   Assess bowel function   Administer ordered medications as needed   Encourage oral fluids to ensure adequate hydration     Problem: Gastrointestinal - Adult  Goal: Maintains adequate nutritional intake  Outcome: Progressing  Flowsheets (Taken 10/12/2022 0310)  Maintains adequate nutritional intake:   Monitor percentage of each meal consumed   Monitor intake and output, weight and lab values     Problem: Genitourinary - Adult  Goal: Absence of urinary retention  10/12/2022 0310 by Oscar Brooks, RN  Outcome: Progressing  Flowsheets (Taken 10/12/2022 0310)  Absence of urinary retention:   Assess patients ability to void and empty bladder   Monitor intake/output and perform bladder scan as needed     Problem: Cardiovascular - Adult  Goal: Absence of cardiac dysrhythmias or at baseline  Recent Flowsheet Documentation  Taken 10/12/2022 0310 by Yessi Rome RN  Absence of cardiac dysrhythmias or at baseline: Monitor cardiac rate and rhythm     Problem: Infection - Adult  Goal: Absence of infection during hospitalization  Recent Flowsheet Documentation  Taken 10/12/2022 0310 by Yessi Rome RN  Absence of infection during hospitalization:   Assess and monitor for signs and symptoms of infection   Monitor lab/diagnostic results   Monitor all insertion sites i.e., indwelling lines, tubes and drains

## 2022-10-13 NOTE — PROGRESS NOTES
CT/CV Surgery Progress Note    10/13/2022 7:14 AM  Surgeon:  Dr. Benjamin Bran: Mr. Michelle Vasquez is resting comfortably in bed, alert, and in no acute distress. Pt denies chest pressure, SOB, fever,chills, N/V/D. He is on 2L O2 NC currently. Chest tube output 260 mL past 24 hrs. S/p Right Thoracotomy with decort and wedge resections POD # 3    I/O last 3 completed shifts: In: 1140 [P.O.:740]  Out: 2766 [Chest Tube:366]    Vital Signs: BP (!) 98/54   Pulse 83   Temp 98.5 °F (36.9 °C) (Oral)   Resp 18   Ht 6' 3\" (1.905 m)   Wt 198 lb 6.6 oz (90 kg)   SpO2 93%   BMI 24.80 kg/m²    Temp (24hrs), Av.3 °F (36.8 °C), Min:97 °F (36.1 °C), Max:99 °F (37.2 °C)    Labs:   CBC:   Recent Labs     10/11/22  0336 10/12/22  0451 10/13/22  0628   WBC 14.2* 7.8 8.0   HGB 11.0* 9.0* 9.1*   HCT 36.0* 29.5* 29.5*   .9* 102.8* 103.9*    125* 134       BMP:   Recent Labs     10/11/22  0336 10/11/22  1129 10/11/22  1811 10/12/22  0451     --   --  136   K 5.7* 5.9* 4.9 4.5     --   --  99   CO2 25  --   --  23   PHOS  --   --   --  4.2   BUN 37*  --   --  55*   CREATININE 7.2*  --   --  10.0*   MG  --   --   --  2.4       Imaging:  Chest X-Ray: 10/13/2022      Findings:   Mediastinum: Stable cardiac silhouette enlargement. Thoracic aortic stent    stable. Lungs: Mild improvement right mid and basilar consolidations. Pleura: No pneumothorax or significant effusion. Right chest tubes stable. Bones: No acute pathology. Impression   Impression:   No residual pneumothorax visible. Mild improvement right mid and basilar consolidations. This document has been electronically signed by:  Gregoria Martinez MD on    10/13/2022 05:03 AM       Intake/Output Summary (Last 24 hours) at 10/13/2022 0714  Last data filed at 10/12/2022 2208  Gross per 24 hour   Intake 700 ml   Output 2660 ml   Net -1960 ml       Scheduled Meds:    epoetin qiana-epbx  2,000 Units SubCUTAneous Once per day on Mon Wed Fri    And    epoetin qiana-epbx  2,000 Units SubCUTAneous Once per day on Mon Wed Fri    doxazosin  4 mg Oral 2 times per day    isosorbide mononitrate  60 mg Oral BID    cefepime  1,000 mg IntraVENous Q24H    sodium chloride flush  5-40 mL IntraVENous 2 times per day    minoxidil  2.5 mg Oral BID    ketamine  100 mg IntraVENous Once    dexamethasone  0.5 mg Oral Daily    docusate sodium  100 mg Oral BID    senna  1 tablet Oral Nightly    [Held by provider] aspirin  81 mg Oral Daily    atorvastatin  40 mg Oral Nightly    cloNIDine  0.3 mg Oral TID    famotidine  20 mg Oral Daily    ferrous sulfate  325 mg Oral Daily with breakfast    folic acid  8,197 mcg Oral Daily    fluticasone  1 spray Nasal Daily    sertraline  100 mg Oral Daily    verapamil  240 mg Oral Daily    multivitamin  1 tablet Oral Daily    sodium chloride flush  10 mL IntraVENous 2 times per day     ROS: All neg unless specifically mentioned in subjective section. Exam:  General Appearance: alert ,conversing, in no acute distress  Cardiovascular: normal rate, regular rhythm, normal S1 and S2, no murmurs, rubs, clicks, or gallops  Pulmonary/Chest: clear to auscultation bilaterally- no wheezes, rales or rhonchi, normal air movement, no respiratory distress  Neurological: alert, oriented, normal speech, no focal findings or movement disorder noted  Rt posterior chest incision: Incision healing appropriately and no wound dehiscence noted. Chest tubes in place.     Assessment:   Patient Active Problem List   Diagnosis    FSGS (focal segmental glomerulosclerosis)    Diabetes mellitus type 2, controlled ([address]    Monoclonal (M) protein disease, multiple 'M' protein    Depression    PTSD (post-traumatic stress disorder)    Secondary renal hyperparathyroidism ([address]    Cocaine use    Substance induced mood disorder (HCC)    Renal artery stenosis (HCC) with uncontrollable hypertension    Chronic alcoholism ([facility]    Severe cocaine use disorder ([address] Uncontrolled hypertension    LVH (left ventricular hypertrophy) due to hypertensive disease    Acute on chronic diastolic CHF (congestive heart failure) ([facility])    REZA (obstructive sleep apnea)    Headache, unspecified headache type    FH: HTN (hypertension)    Hypertrophic cardiomyopathy ([address]    Diet-controlled diabetes mellitus ([address]    A-V fistula (HCC)    Chronic thoracic aortic dissection    Hyperphosphatemia    Anemia in CKD (chronic kidney disease)    Vitamin D deficiency    Thrombocytopenia (HCC)    Personality disorder (HCC)    Tobacco abuse    Metabolic acidosis    Pneumonia due to organism    Precordial pain    ESRD on hemodialysis (HCC)    Edema of upper extremity    ESRD (end stage renal disease) ([facility])    Hyperglycemia    Cocaine abuse, continuous - reports spending $100 on cocaine \"every other day\"    Homelessness    Moderate episode of recurrent major depressive disorder ([address]    Pneumonia    Noncompliance of patient with renal dialysis ([address]    Anemia associated with stage 5 chronic renal failure ([address]    Hypertensive emergency    Cocaine use disorder, severe, dependence ([address]    Alcohol use disorder, severe, dependence ([address]    Abdominal aortic aneurysm (AAA) without rupture    Atrial flutter with rapid ventricular response (HCC)    PVD (peripheral vascular disease) ([facility])    Dyslipidemia    Other fluid overload    Laceration of flexor muscle, fascia and tendon of left thumb at forearm level, initial encounter    Acute respiratory failure with hypoxia and hypercarbia (HCC)    Acute pulmonary edema (HCC)    Macrocytic anemia    History of aortic aneurysm repair    Medical non-compliance    Volume overload    Chest pain    Dyspnea    Pancytopenia (HCC)    Acute febrile illness    Hyponatremia    Hypermagnesemia    History of atrial flutter    History of alcohol abuse    History of cocaine abuse ([facility])    Mild tricuspid regurgitation    Uremia, acute    COVID-19 virus detected    COVID-19    Hypoxia Trapped lung     Plan:   CXR reviewed- Continue daily CXR's    Keep chest tubes in place with suction.   Continue current therapy    The plan of care was discussed in detail with Dr. Lawana Collet     Electronically signed by Ute Thornton PA-C on 10/13/2022 at 7:14 AM     Miniml drainage only 60cc  No air leak  CXR shows expansion of lung with some consolidatin at bse  Remove chest tubes

## 2022-10-13 NOTE — PROGRESS NOTES
Pt was in bed and alone at the time of the visit. He was dealing with hypoxia. He said that he has been through a lot. That he  in the operating room twice but was revived. He got some kidney issues and does dialysis. He was hopeful and said that the [facility] has been keeping him alive. Prayer was appreciated. 10/13/22 1506   Encounter Summary   Service Provided For: Patient   [facility]   Support System Spouse   Last Encounter  10/13/22   Complexity of Encounter Moderate   Begin Time 1401   End Time  1412   Total Time Calculated 11 min   Spiritual/Emotional needs   Type Spiritual Support   Assessment/Intervention/Outcome   Assessment Calm; Hopeful   Intervention Empowerment; Active listening

## 2022-10-13 NOTE — PROGRESS NOTES
Patient refuses bed alarm at this time  Education provided regarding importance of bed alarm for safety. Will continue to round and assist patient as needed. Pt verbalizes understanding at this time.

## 2022-10-13 NOTE — PLAN OF CARE
Problem: Discharge Planning  Goal: Discharge to home or other facility with appropriate resources  Outcome: Progressing  Flowsheets (Taken 10/13/2022 0040 by Onofre Arzola RN)  Discharge to home or other facility with appropriate resources:   Identify barriers to discharge with patient and caregiver   Arrange for needed discharge resources and transportation as appropriate   Identify discharge learning needs (meds, wound care, etc)   Refer to discharge planning if patient needs post-hospital services based on physician order or complex needs related to functional status, cognitive ability or social support system     Problem: Pain  Goal: Verbalizes/displays adequate comfort level or baseline comfort level  Outcome: Progressing  Flowsheets (Taken 10/13/2022 0800)  Verbalizes/displays adequate comfort level or baseline comfort level: Encourage patient to monitor pain and request assistance     Problem: Safety - Adult  Goal: Free from fall injury  Outcome: Progressing  Flowsheets (Taken 10/13/2022 0938)  Free From Fall Injury: Instruct family/caregiver on patient safety     Problem: ABCDS Injury Assessment  Goal: Absence of physical injury  Outcome: Progressing  Flowsheets (Taken 10/12/2022 1630 by Jaki Petersen RN)  Absence of Physical Injury: Implement safety measures based on patient assessment     Problem: Respiratory - Adult  Goal: Achieves optimal ventilation and oxygenation  Outcome: Progressing  Flowsheets (Taken 10/12/2022 1400 by Jaki Petersen RN)  Achieves optimal ventilation and oxygenation:   Assess for changes in mentation and behavior   Assess for changes in respiratory status   Position to facilitate oxygenation and minimize respiratory effort   Oxygen supplementation based on oxygen saturation or arterial blood gases   Encourage broncho-pulmonary hygiene including cough, deep breathe, incentive spirometry     Problem: Cardiovascular - Adult  Goal: Maintains optimal cardiac output and hemodynamic stability  Outcome: Progressing  Flowsheets (Taken 10/12/2022 1400 by Jose Hernandez RN)  Maintains optimal cardiac output and hemodynamic stability:   Monitor blood pressure and heart rate   Monitor urine output and notify Licensed Independent Practitioner for values outside of normal range   Administer fluid and/or volume expanders as ordered  Goal: Absence of cardiac dysrhythmias or at baseline  Outcome: Progressing  Flowsheets (Taken 10/12/2022 1400 by Jose Hernandez RN)  Absence of cardiac dysrhythmias or at baseline:   Monitor cardiac rate and rhythm   Assess for signs of decreased cardiac output   Administer antiarrhythmia medication and electrolyte replacement as ordered     Problem: Skin/Tissue Integrity - Adult  Goal: Skin integrity remains intact  Outcome: Progressing  Flowsheets (Taken 10/13/2022 0938)  Skin Integrity Remains Intact: Monitor for areas of redness and/or skin breakdown  Goal: Incisions, wounds, or drain sites healing without S/S of infection  Outcome: Progressing  Flowsheets (Taken 10/13/2022 0938)  Incisions, Wounds, or Drain Sites Healing Without Sign and Symptoms of Infection: ADMISSION and DAILY: Assess and document risk factors for pressure ulcer development     Problem: Infection - Adult  Goal: Absence of infection at discharge  Outcome: Progressing  Flowsheets (Taken 10/13/2022 0938)  Absence of infection at discharge: Assess and monitor for signs and symptoms of infection  Goal: Absence of infection during hospitalization  Outcome: Progressing  Flowsheets (Taken 10/13/2022 0938)  Absence of infection during hospitalization: Assess and monitor for signs and symptoms of infection     Problem: Metabolic/Fluid and Electrolytes - Adult  Goal: Electrolytes maintained within normal limits  Outcome: Progressing  Flowsheets (Taken 10/12/2022 1400 by Jose Hernandez RN)  Electrolytes maintained within normal limits:   Monitor labs and assess patient for signs and symptoms of electrolyte imbalances   Administer electrolyte replacement as ordered   Monitor response to electrolyte replacements, including repeat lab results as appropriate  Goal: Hemodynamic stability and optimal renal function maintained  Outcome: Progressing  Flowsheets (Taken 10/12/2022 1400 by Jacinta Carreon RN)  Hemodynamic stability and optimal renal function maintained:   Monitor labs and assess for signs and symptoms of volume excess or deficit   Monitor intake, output and patient weight   Monitor response to interventions for patient's volume status, including labs, urine output, blood pressure (other measures as available)   Encourage oral intake as appropriate     Problem: Hematologic - Adult  Goal: Maintains hematologic stability  Outcome: Progressing  Flowsheets (Taken 10/12/2022 1400 by Jacinta Carreon RN)  Maintains hematologic stability:   Assess for signs and symptoms of bleeding or hemorrhage   Monitor labs for bleeding or clotting disorders     Problem: Chronic Conditions and Co-morbidities  Goal: Patient's chronic conditions and co-morbidity symptoms are monitored and maintained or improved  Outcome: Progressing  Floresitaheets (Taken 10/12/2022 1400 by Jacinta Carreon RN)  Care Plan - Patient's Chronic Conditions and Co-Morbidity Symptoms are Monitored and Maintained or Improved:   Monitor and assess patient's chronic conditions and comorbid symptoms for stability, deterioration, or improvement   Collaborate with multidisciplinary team to address chronic and comorbid conditions and prevent exacerbation or deterioration   Update acute care plan with appropriate goals if chronic or comorbid symptoms are exacerbated and prevent overall improvement and discharge     Problem: Neurosensory - Adult  Goal: Achieves maximal functionality and self care  Outcome: Progressing  Flowsheets (Taken 10/12/2022 1400 by Jacinta Carreon RN)  Achieves maximal functionality and self care:   Monitor swallowing and airway patency with patient fatigue and changes in neurological status   Encourage and assist patient to increase activity and self care with guidance from physical therapy/occupational therapy  Goal: Achieves stable or improved neurological status  Outcome: Progressing  Flowsheets (Taken 10/12/2022 1400 by Kelvin Cobian RN)  Achieves stable or improved neurological status:   Assess for and report changes in neurological status   Initiate measures to prevent increased intracranial pressure   Maintain blood pressure and fluid volume within ordered parameters to optimize cerebral perfusion and minimize risk of hemorrhage     Problem: Musculoskeletal - Adult  Goal: Return mobility to safest level of function  Outcome: Progressing  Flowsheets (Taken 10/12/2022 1400 by Kelvin Cobian, RN)  Return Mobility to Safest Level of Function:   Assess patient stability and activity tolerance for standing, transferring and ambulating with or without assistive devices   Assist with transfers and ambulation using safe patient handling equipment as needed  Goal: Maintain proper alignment of affected body part  Outcome: Progressing  Flowsheets (Taken 10/12/2022 1400 by Kelvin Cobian RN)  Maintain proper alignment of affected body part: Support and protect limb and body alignment per provider's orders  Goal: Return ADL status to a safe level of function  Outcome: Progressing  Flowsheets (Taken 10/12/2022 1802 by Kelvin Cobian, RN)  Return ADL Status to a Safe Level of Function:   Administer medication as ordered   Assess activities of daily living deficits and provide assistive devices as needed     Problem: Gastrointestinal - Adult  Goal: Maintains or returns to baseline bowel function  Outcome: Progressing  Flowsheets (Taken 10/12/2022 1400 by Kelvin Cobian, RN)  Maintains or returns to baseline bowel function:   Assess bowel function   Encourage oral fluids to ensure adequate hydration Administer IV fluids as ordered to ensure adequate hydration   Encourage mobilization and activity   Administer ordered medications as needed  Goal: Maintains adequate nutritional intake  Outcome: Progressing  Flowsheets (Taken 10/12/2022 1400 by Alvarez French RN)  Maintains adequate nutritional intake:   Monitor percentage of each meal consumed   Assist with meals as needed     Problem: Genitourinary - Adult  Goal: Absence of urinary retention  Outcome: Progressing  Flowsheets (Taken 10/12/2022 1400 by Alvarez French RN)  Absence of urinary retention:   Assess patients ability to void and empty bladder   Monitor intake/output and perform bladder scan as needed   Place urinary catheter per Licensed Independent Practitioner order if needed

## 2022-10-13 NOTE — PROGRESS NOTES
[facility] ICU STEPDOWN TELEMETRY 4K  Occupational Therapy  Daily Note  Time:   Time In: 1053  Time Out: 1136  Timed Code Treatment Minutes: 37 Minutes  Minutes: 43          Date: 10/13/2022  Patient Name: Shanita Cifuentes,   Gender: male      Room: -14/014-A  MRN: [id number]  : 1965  (56 y.o.)  Referring Practitioner: Dr. Kapil Gongora MD  Diagnosis: Hypoxia  Additional Pertinent Hx: Pt presented to [address] [place] [address] on 2022 with worsening shortness of breath at rest and with exertion. CXR showed large right pleural effusion and patient required PAP therapy for adequate oxygenation. Patient was admitted and chest tube placed on  with 2 L of serosanguineous fluid drainage. Dialysis also performed at  with 2 L of fluid. Repeat dialysis performed on  with albumin administration. Repeat dialysis performed on 2022. Pleural fluid analysis was consistent with exudative effusion with testing for malignancy pending. Suction for chest tube was discontinued on 10/01. Restrictions/Precautions:  Restrictions/Precautions: Fall Risk  Position Activity Restriction  Other position/activity restrictions: R chest tubes (10/10), Montior BP     SUBJECTIVE: Patient seated in bedside chair upon arrival; agreeable to therapy this date. Patient becomes argumentative with therapist regarding safety,during mobility activity as well as other education advice regarding treatment with patient stating \"I have been doing this stuff for 55years, I know what I am doing\". Patient not receptive initially however upon returning to room, patient becomes dizzy when attempting to sit in bedside chair. Patient becomes apologetic to therapist stating \"you were right, I was wrong\". Patient appreciative of therapy services at end of session. Nursing notified of O2 with ambulation.     PAIN: 8/10: tube site, patient notes tubes to be removed this date    Vitals: Blood Pressure: beginning of session 93/40, nursing ok'd session End of session, 104/54  Oxygen: 2L initially at 98% nasal canula seated in bedside chair. Patient states he only wears d/t medication requesting to take of O2 with mobility activity. Patient walked to end of nursing unit to address vitals, dropped to 81% with nasal canula applied requiring increase to 3L in order to recover. Returned to room, patient dropped to 81%, 3L with patient able to recover x 4min rest break, returning to 2L    COGNITION: Decreased Insight, Decreased Problem Solving, Decreased Safety Awareness, Difficulty Following Commands, and Impulsive    ADL:   No ADL's completed this session. Kamille Push BALANCE:  Sitting Balance:  Supervision. Standing Balance: Contact Guard Assistance. RW, no LOB    BED MOBILITY:  Not Tested    TRANSFERS:  Sit to Stand:  Contact Guard Assistance. Stand to Sit: Contact Guard Assistance. 1LOB d/t dizziness    FUNCTIONAL MOBILITY:  Assistive Device: Rolling Walker  Assist Level:  Contact Guard Assistance, X 1, and with verbal cues . Distance:  within unit x 1 rest break x 10 min  Patient impulsive with quick walking stating \"I have to push myself\". Patient demonstrates decreased safety awareness requiring moderate verbal cues and education with RW management as patient bumps into chair with RW.    ADDITIONAL ACTIVITIES:  Patient completed BUE strengthening exercises with skilled education on HEP: completed x10 reps x1 set with a moderate resistance band in all joints and all planes in order to improve UE strength and activity tolerance required for BADL routine and toilet / shower transfers. Patient tolerated good, requiring minimal rest breaks. Patient also required minimal verbal/visual cues for technique.      Patient educated regarding energy conservation and building activity tolerance d/t diagnosis and medical complexity with deconditioning in hospital.  Initially patient declined to acknowledge symptoms and safety awareness, receptive at end of session, grateful with all questions answered; verbalizing understanding. ASSESSMENT:     Activity Tolerance:  Patient tolerance of  treatment: good. Discharge Recommendations: 24 hour assistance or supervision  Equipment Recommendations: Equipment Needed: No  Plan: Times Per Week: 5x  Times Per Day: Once a day  Specific Instructions for Next Treatment: Functional mobility; ADLs and energy conservation techniques; UE exercises with steady breathing. Current Treatment Recommendations: Strengthening, Balance training, Functional mobility training, Endurance training, Safety education & training, Self-Care / ADL  Additional Comments: Pt would benefit from continued skilled OT services when medically stable and discharged from Acute. Patient Education  Patient Education: Role of OT, Plan of Care, ADL's, IADL's, Energy Conservation, Home Exercise Program, Equipment Education, Reviewed Prior Education, Home Safety, Importance of Increasing Activity, and Assistive Device Safety    Goals  Short Term Goals  Time Frame for Short Term Goals: By discharge  Short Term Goal 1: Pt will demonstrate functional mobility walking to/from the bathroom or in the kitchenette while using any AD needed with SBA and verbal cues for management of the O2 line to prepare for doing self care at home and move about safely. Short Term Goal 2: Pt will complete a spongebath or shower with SBA while following energy conservation techniques including planning and pacing to increase his activity tolerance for ease of doing his mourning routine. Short Term Goal 3: Pt will complete BUE ROM/high resistance exercises while using proper breathing technique to increase his strength and endurance for ease of doing ADLs or going in/out of his home. Short Term Goal 4: Pt will complete toileting routine including transfers to/from the standard toilet seat with supervision to increase his independence with self care.   Additional Goals?: No    Following session, patient left in safe position with all fall risk precautions in place.

## 2022-10-14 ENCOUNTER — APPOINTMENT (OUTPATIENT)
Dept: GENERAL RADIOLOGY | Age: 57
DRG: 163 | End: 2022-10-14
Payer: MEDICARE

## 2022-10-14 LAB
ANION GAP SERPL CALCULATED.3IONS-SCNC: 12 MEQ/L (ref 8–16)
BUN BLDV-MCNC: 56 MG/DL (ref 7–22)
CALCIUM SERPL-MCNC: 9 MG/DL (ref 8.5–10.5)
CHLORIDE BLD-SCNC: 99 MEQ/L (ref 98–111)
CO2: 25 MEQ/L (ref 23–33)
CREAT SERPL-MCNC: 9.2 MG/DL (ref 0.4–1.2)
ERYTHROCYTE [DISTWIDTH] IN BLOOD BY AUTOMATED COUNT: 13.2 % (ref 11.5–14.5)
ERYTHROCYTE [DISTWIDTH] IN BLOOD BY AUTOMATED COUNT: 49.8 FL (ref 35–45)
GFR SERPL CREATININE-BSD FRML MDRD: 7 ML/MIN/1.73M2
GLUCOSE BLD-MCNC: 85 MG/DL (ref 70–108)
HCT VFR BLD CALC: 27.6 % (ref 42–52)
HEMOGLOBIN: 8.2 GM/DL (ref 14–18)
MCH RBC QN AUTO: 31.1 PG (ref 26–33)
MCHC RBC AUTO-ENTMCNC: 29.7 GM/DL (ref 32.2–35.5)
MCV RBC AUTO: 104.5 FL (ref 80–94)
PLATELET # BLD: 127 THOU/MM3 (ref 130–400)
PMV BLD AUTO: 10.1 FL (ref 9.4–12.4)
POTASSIUM SERPL-SCNC: 4.1 MEQ/L (ref 3.5–5.2)
RBC # BLD: 2.64 MILL/MM3 (ref 4.7–6.1)
SODIUM BLD-SCNC: 136 MEQ/L (ref 135–145)
WBC # BLD: 6 THOU/MM3 (ref 4.8–10.8)

## 2022-10-14 PROCEDURE — 6370000000 HC RX 637 (ALT 250 FOR IP): Performed by: PHYSICIAN ASSISTANT

## 2022-10-14 PROCEDURE — 99232 SBSQ HOSP IP/OBS MODERATE 35: CPT | Performed by: INTERNAL MEDICINE

## 2022-10-14 PROCEDURE — 6360000002 HC RX W HCPCS: Performed by: INTERNAL MEDICINE

## 2022-10-14 PROCEDURE — 2060000000 HC ICU INTERMEDIATE R&B

## 2022-10-14 PROCEDURE — 6370000000 HC RX 637 (ALT 250 FOR IP): Performed by: STUDENT IN AN ORGANIZED HEALTH CARE EDUCATION/TRAINING PROGRAM

## 2022-10-14 PROCEDURE — 6370000000 HC RX 637 (ALT 250 FOR IP): Performed by: INTERNAL MEDICINE

## 2022-10-14 PROCEDURE — APPSS30 APP SPLIT SHARED TIME 16-30 MINUTES: Performed by: PHYSICIAN ASSISTANT

## 2022-10-14 PROCEDURE — 2580000003 HC RX 258: Performed by: PHARMACIST

## 2022-10-14 PROCEDURE — 6360000002 HC RX W HCPCS: Performed by: PHARMACIST

## 2022-10-14 PROCEDURE — 6370000000 HC RX 637 (ALT 250 FOR IP)

## 2022-10-14 PROCEDURE — 80048 BASIC METABOLIC PNL TOTAL CA: CPT

## 2022-10-14 PROCEDURE — 71045 X-RAY EXAM CHEST 1 VIEW: CPT

## 2022-10-14 PROCEDURE — 2580000003 HC RX 258: Performed by: PHYSICIAN ASSISTANT

## 2022-10-14 PROCEDURE — 90935 HEMODIALYSIS ONE EVALUATION: CPT

## 2022-10-14 PROCEDURE — 36415 COLL VENOUS BLD VENIPUNCTURE: CPT

## 2022-10-14 PROCEDURE — 85027 COMPLETE CBC AUTOMATED: CPT

## 2022-10-14 RX ADMIN — CINACALCET HYDROCHLORIDE 150 MG: 30 TABLET, FILM COATED ORAL at 13:33

## 2022-10-14 RX ADMIN — ASPIRIN 81 MG: 81 TABLET, COATED ORAL at 13:33

## 2022-10-14 RX ADMIN — ATORVASTATIN CALCIUM 40 MG: 40 TABLET, FILM COATED ORAL at 21:05

## 2022-10-14 RX ADMIN — CLONIDINE HYDROCHLORIDE 0.3 MG: 0.2 TABLET ORAL at 21:10

## 2022-10-14 RX ADMIN — SODIUM CHLORIDE, PRESERVATIVE FREE 10 ML: 5 INJECTION INTRAVENOUS at 13:37

## 2022-10-14 RX ADMIN — Medication 1 TABLET: at 13:34

## 2022-10-14 RX ADMIN — OXYCODONE AND ACETAMINOPHEN 2 TABLET: 5; 325 TABLET ORAL at 13:34

## 2022-10-14 RX ADMIN — FAMOTIDINE 20 MG: 20 TABLET ORAL at 13:34

## 2022-10-14 RX ADMIN — EPOETIN ALFA-EPBX 2000 UNITS: 2000 INJECTION, SOLUTION INTRAVENOUS; SUBCUTANEOUS at 13:33

## 2022-10-14 RX ADMIN — ISOSORBIDE MONONITRATE 60 MG: 60 TABLET, EXTENDED RELEASE ORAL at 13:34

## 2022-10-14 RX ADMIN — VERAPAMIL HYDROCHLORIDE 240 MG: 240 TABLET, FILM COATED, EXTENDED RELEASE ORAL at 13:34

## 2022-10-14 RX ADMIN — OXYCODONE AND ACETAMINOPHEN 2 TABLET: 5; 325 TABLET ORAL at 22:13

## 2022-10-14 RX ADMIN — ISOSORBIDE MONONITRATE 60 MG: 60 TABLET, EXTENDED RELEASE ORAL at 21:10

## 2022-10-14 RX ADMIN — FLUTICASONE PROPIONATE 1 SPRAY: 50 SPRAY, METERED NASAL at 13:33

## 2022-10-14 RX ADMIN — DOXAZOSIN 4 MG: 4 TABLET ORAL at 21:11

## 2022-10-14 RX ADMIN — SENNOSIDES 8.6 MG: 8.6 TABLET, COATED ORAL at 21:10

## 2022-10-14 RX ADMIN — OXYCODONE AND ACETAMINOPHEN 2 TABLET: 5; 325 TABLET ORAL at 17:39

## 2022-10-14 RX ADMIN — DOCUSATE SODIUM 100 MG: 100 CAPSULE, LIQUID FILLED ORAL at 21:10

## 2022-10-14 RX ADMIN — CEFEPIME 1000 MG: 1 INJECTION, POWDER, FOR SOLUTION INTRAMUSCULAR; INTRAVENOUS at 21:05

## 2022-10-14 RX ADMIN — DOXAZOSIN 4 MG: 4 TABLET ORAL at 13:37

## 2022-10-14 RX ADMIN — DOCUSATE SODIUM 100 MG: 100 CAPSULE, LIQUID FILLED ORAL at 13:34

## 2022-10-14 RX ADMIN — FOLIC ACID 1000 MCG: 1 TABLET ORAL at 13:34

## 2022-10-14 RX ADMIN — SERTRALINE 100 MG: 100 TABLET, FILM COATED ORAL at 13:34

## 2022-10-14 RX ADMIN — SODIUM CHLORIDE, PRESERVATIVE FREE 10 ML: 5 INJECTION INTRAVENOUS at 21:11

## 2022-10-14 RX ADMIN — DEXAMETHASONE 0.5 MG: 0.5 TABLET ORAL at 13:34

## 2022-10-14 RX ADMIN — CALCITRIOL CAPSULES 0.25 MCG 2.25 MCG: 0.25 CAPSULE ORAL at 13:33

## 2022-10-14 RX ADMIN — SODIUM CHLORIDE, PRESERVATIVE FREE 10 ML: 5 INJECTION INTRAVENOUS at 21:10

## 2022-10-14 RX ADMIN — EPOETIN ALFA-EPBX 2000 UNITS: 2000 INJECTION, SOLUTION INTRAVENOUS; SUBCUTANEOUS at 13:32

## 2022-10-14 RX ADMIN — CLONIDINE HYDROCHLORIDE 0.3 MG: 0.2 TABLET ORAL at 13:19

## 2022-10-14 RX ADMIN — FERROUS SULFATE TAB 325 MG (65 MG ELEMENTAL FE) 325 MG: 325 (65 FE) TAB at 13:34

## 2022-10-14 ASSESSMENT — PAIN SCALES - WONG BAKER
WONGBAKER_NUMERICALRESPONSE: 0

## 2022-10-14 ASSESSMENT — PAIN - FUNCTIONAL ASSESSMENT
PAIN_FUNCTIONAL_ASSESSMENT: PREVENTS OR INTERFERES SOME ACTIVE ACTIVITIES AND ADLS
PAIN_FUNCTIONAL_ASSESSMENT: PREVENTS OR INTERFERES SOME ACTIVE ACTIVITIES AND ADLS
PAIN_FUNCTIONAL_ASSESSMENT: ACTIVITIES ARE NOT PREVENTED

## 2022-10-14 ASSESSMENT — PAIN DESCRIPTION - ORIENTATION
ORIENTATION: RIGHT

## 2022-10-14 ASSESSMENT — PAIN DESCRIPTION - DESCRIPTORS
DESCRIPTORS: ACHING;DISCOMFORT;SHARP;SHOOTING
DESCRIPTORS: SORE
DESCRIPTORS: ACHING;DISCOMFORT
DESCRIPTORS: ACHING;DISCOMFORT;SORE
DESCRIPTORS: ACHING;DISCOMFORT;GNAWING;SHARP
DESCRIPTORS: ACHING;DISCOMFORT;SORE

## 2022-10-14 ASSESSMENT — PAIN DESCRIPTION - LOCATION
LOCATION: CHEST;INCISION
LOCATION: CHEST;INCISION
LOCATION: CHEST
LOCATION: CHEST
LOCATION: CHEST;INCISION
LOCATION: RIB CAGE
LOCATION: CHEST
LOCATION: CHEST

## 2022-10-14 ASSESSMENT — PAIN SCALES - GENERAL
PAINLEVEL_OUTOF10: 7
PAINLEVEL_OUTOF10: 5
PAINLEVEL_OUTOF10: 0
PAINLEVEL_OUTOF10: 10
PAINLEVEL_OUTOF10: 9
PAINLEVEL_OUTOF10: 6
PAINLEVEL_OUTOF10: 8
PAINLEVEL_OUTOF10: 7
PAINLEVEL_OUTOF10: 8
PAINLEVEL_OUTOF10: 7
PAINLEVEL_OUTOF10: 8
PAINLEVEL_OUTOF10: 0
PAINLEVEL_OUTOF10: 7

## 2022-10-14 ASSESSMENT — PAIN DESCRIPTION - FREQUENCY
FREQUENCY: CONTINUOUS

## 2022-10-14 ASSESSMENT — PAIN DESCRIPTION - PAIN TYPE
TYPE: ACUTE PAIN;SURGICAL PAIN
TYPE: SURGICAL PAIN
TYPE: SURGICAL PAIN;ACUTE PAIN
TYPE: SURGICAL PAIN

## 2022-10-14 ASSESSMENT — PAIN DESCRIPTION - ONSET
ONSET: ON-GOING

## 2022-10-14 NOTE — PROGRESS NOTES
[address] [facility] [address] THERAPY MISSED TREATMENT NOTE  [facility] ICU STEPDOWN TELEMETRY 4K  4K-14/014-A      Date: 10/14/2022  Patient Name: Dorian Bowen        CSN: [id number]   : 1965  (56 y.o.)  Gender: male   Referring Practitioner: Dr. Gabriele Norris MD  Diagnosis: Hypoxia         REASON FOR MISSED TREATMENT: Patient Off Floor for Dialysis

## 2022-10-14 NOTE — CARE COORDINATION
10/14/22, 10:12 AM EDT    Patient goals/plan/ treatment preferences discussed by  and . Patient goals/plan/ treatment preferences reviewed with patient/ family. Patient/ family verbalize understanding of discharge plan and are in agreement with goal/plan/treatment preferences. Understanding was demonstrated using the teach back method. AVS provided by RN at time of discharge, which includes all necessary medical information pertaining to the patients current course of illness, treatment, post-discharge goals of care, and treatment preferences. Services At/After Discharge: [address] [place] [address]    Patient to potentially discharge over the weekend with spouse and [facility]. SW notified [facility] of discharge over weekend. RN made aware.         IMM Letter  IMM Letter given to Patient/Family/Significant other/Guardian/POA/by[de-identified] GARLAND Saldivar CM  IMM Letter date given[de-identified] 09/30/22  IMM Letter time given[de-identified] [phone]

## 2022-10-14 NOTE — PROGRESS NOTES
HD nurse called for updates. Informed her of new CXR results and that patient has had soft Bps and that I held morning BP medications. She is aware and agrees with holding the BP medication. Patient will have HD as close to 0700 as possible.

## 2022-10-14 NOTE — CARE COORDINATION
10/14/22, 11:49 AM EDT    Patient goals/plan/ treatment preferences discussed by  and . Patient goals/plan/ treatment preferences reviewed with patient/ family. Patient/ family verbalize understanding of discharge plan and are in agreement with goal/plan/treatment preferences. Understanding was demonstrated using the teach back method. AVS provided by RN at time of discharge, which includes all necessary medical information pertaining to the patients current course of illness, treatment, post-discharge goals of care, and treatment preferences. Services At/After Discharge: DME, Home Health, Nursing service, OT, and PT  [facility]), [facility] [id number] chair time; B/W Cab transports to ; has AVF; therapy following; plans new SR HME RW after choices offered; collaborated w Paul Aguiar, [address] Liaison (home oxygen eval pending) when medically cleared       IMM Letter  IMM Letter given to Patient/Family/Significant other/Guardian/POA/by[de-identified] Delmy Tavarez RN CM  IMM Letter date given[de-identified] 10/14/22  IMM Letter time given[de-identified] [address] was evaluated today and a DME order was entered for a wheeled walker because he requires this to successfully complete daily living tasks of ambulating. A wheeled walker is necessary due to the patient's unsteady gait, upper body weakness, and inability to  an ambulation device; and he can ambulate only by pushing a walker instead of a lesser assistive device such as a cane, crutch, or standard walker. The need for this equipment was discussed with the patient and he understands and is in agreement.

## 2022-10-14 NOTE — FLOWSHEET NOTE
Pt responds to audio , denied any needs at this time , stated his call light ws right by him , visitor also present in room talking with pt

## 2022-10-14 NOTE — PLAN OF CARE
Problem: Discharge Planning  Goal: Discharge to home or other facility with appropriate resources  Outcome: Progressing     Problem: Pain  Goal: Verbalizes/displays adequate comfort level or baseline comfort level  Outcome: Progressing  Flowsheets  Taken 10/13/2022 2341  Verbalizes/displays adequate comfort level or baseline comfort level:   Encourage patient to monitor pain and request assistance   Administer analgesics based on type and severity of pain and evaluate response   Assess pain using appropriate pain scale  Taken 10/13/2022 2001  Verbalizes/displays adequate comfort level or baseline comfort level:   Encourage patient to monitor pain and request assistance   Assess pain using appropriate pain scale   Administer analgesics based on type and severity of pain and evaluate response     Problem: Safety - Adult  Goal: Free from fall injury  Outcome: Progressing     Problem: ABCDS Injury Assessment  Goal: Absence of physical injury  Outcome: Progressing     Problem: Respiratory - Adult  Goal: Achieves optimal ventilation and oxygenation  Outcome: Progressing     Problem: Cardiovascular - Adult  Goal: Maintains optimal cardiac output and hemodynamic stability  Outcome: Progressing  Goal: Absence of cardiac dysrhythmias or at baseline  Outcome: Progressing     Problem: Skin/Tissue Integrity - Adult  Goal: Skin integrity remains intact  Outcome: Progressing  Flowsheets (Taken 10/13/2022 2001)  Skin Integrity Remains Intact: Monitor for areas of redness and/or skin breakdown  Goal: Incisions, wounds, or drain sites healing without S/S of infection  Outcome: Progressing     Problem: Infection - Adult  Goal: Absence of infection at discharge  Outcome: Progressing  Goal: Absence of infection during hospitalization  Outcome: Progressing     Problem: Metabolic/Fluid and Electrolytes - Adult  Goal: Electrolytes maintained within normal limits  Outcome: Progressing  Goal: Hemodynamic stability and optimal renal function maintained  Outcome: Progressing     Problem: Hematologic - Adult  Goal: Maintains hematologic stability  Outcome: Progressing     Problem: Chronic Conditions and Co-morbidities  Goal: Patient's chronic conditions and co-morbidity symptoms are monitored and maintained or improved  Outcome: Progressing     Problem: Neurosensory - Adult  Goal: Achieves maximal functionality and self care  Outcome: Progressing  Goal: Achieves stable or improved neurological status  Outcome: Progressing     Problem: Musculoskeletal - Adult  Goal: Return mobility to safest level of function  Outcome: Progressing  Flowsheets (Taken 10/13/2022 2001)  Return Mobility to Safest Level of Function:   Assess patient stability and activity tolerance for standing, transferring and ambulating with or without assistive devices   Assist with transfers and ambulation using safe patient handling equipment as needed  Goal: Maintain proper alignment of affected body part  Outcome: Progressing  Flowsheets (Taken 10/13/2022 2001)  Maintain proper alignment of affected body part:   Support and protect limb and body alignment per provider's orders   Instruct and reinforce with patient and family use of appropriate assistive device and precautions (e.g. spinal or hip dislocation precautions)  Goal: Return ADL status to a safe level of function  Outcome: Progressing     Problem: Gastrointestinal - Adult  Goal: Maintains or returns to baseline bowel function  Outcome: Progressing  Goal: Maintains adequate nutritional intake  Outcome: Progressing     Problem: Genitourinary - Adult  Goal: Absence of urinary retention  Outcome: Progressing

## 2022-10-14 NOTE — FLOWSHEET NOTE
Stable 4 hour treatment complete. Removed 1.4 liters of fluid as per order. Tolerated fluid removal well. Pressure held to needle sites times ten minutes each. Dressing clean, dry and intact. Report given to primary RN. Treatment record printed for scanning into EMR. 10/14/22 0735 10/14/22 1240   Vital Signs   /63 (!) 147/67   Temp 97.3 °F (36.3 °C) 97.3 °F (36.3 °C)   Heart Rate 99 98   Resp 18 18   SpO2 99 %  --    Weight 226 lb 6.4 oz (102.7 kg) 223 lb 12.3 oz (101.5 kg)   Weight Method Bed scale Bed scale   Percent Weight Change 1.71 -1.16   Post-Hemodialysis Assessment   Post-Treatment Procedures  --  Blood returned;Catheter Capped, clamped with Saline x2 ports   Machine Disinfection Process  --  Acid/Vinegar Clean;Heat Disinfect; Exterior Machine Disinfection   Rinseback Volume (ml)  --  0 ml   Blood Volume Processed (Liters)  --  102.2 l/min   Dialyzer Clearance  --  Lightly streaked   Duration of Treatment (minutes)  --  240 minutes   Heparin Amount Administered During Treatment (mL)  --  0 mL   Hemodialysis Intake (ml)  --  400 ml   Hemodialysis Output (ml)  --  1800 ml   NET Removed (ml)  --  1400   Tolerated Treatment  --  Good

## 2022-10-14 NOTE — PROGRESS NOTES
[facility]   Nephrology progress note  10/14/2022, 10:06 AM      Pt Name:    Katarzyna Nino  MRN:     [id number]     YOB: 1965  Admit Date:    9/28/2022  9:56 AM    Chief Complaint: Nephrology following for ESRD. Subjective:  Patient was seen and examined this morning . Chest tube removed yesterday. Breathing stable. On 2 L NC. Objective:  24HR INTAKE/OUTPUT:    Intake/Output Summary (Last 24 hours) at 10/14/2022 1006  Last data filed at 10/14/2022 0735  Gross per 24 hour   Intake 349.68 ml   Output --   Net 349.68 ml         I/O last 3 completed shifts:   In: 409.7 [P.O.:360; IV Piggyback:49.7]  Out: 60 [Chest Tube:60]  I/O this shift:  In: 240 [P.O.:240]  Out: -    Admission weight: 250 lb (113.4 kg)  Wt Readings from Last 3 Encounters:   10/14/22 226 lb 6.4 oz (102.7 kg)   09/13/21 250 lb (113.4 kg)   07/26/21 240 lb (108.9 kg)        Vitals :   Vitals:    10/13/22 2116 10/13/22 2341 10/14/22 0339 10/14/22 0735   BP: 116/60 (!) 100/52 (!) 117/55 123/63   Pulse:  92 89 99   Resp:  16 16 18   Temp:  98.4 °F (36.9 °C) 98.6 °F (37 °C) 97.3 °F (36.3 °C)   TempSrc:  Oral Oral    SpO2:  96% 98% 99%   Weight:   222 lb 9.6 oz (101 kg) 226 lb 6.4 oz (102.7 kg)   Height:           Physical examination  General Appearance: alert and cooperative with exam, appears comfortable, no distress  Mouth/Throat: Oral mucosa moist  Neck: No JVD  Lungs: diminished, has chest binder  Heart:  S1, S2 heard  GI: soft, non-tender  Extremities: 1+LE edema, left arm AV fistula    Medications:  Infusion:    sodium chloride      sodium chloride       Meds:    calcitRIOL  2.25 mcg Oral Once per day on Mon Wed Fri    cinacalcet  150 mg Oral Once per day on Mon Wed Fri    epoetin qiana-epbx  2,000 Units SubCUTAneous Once per day on Mon Wed Fri    And    epoetin qiana-epbx  2,000 Units SubCUTAneous Once per day on Mon Wed Fri    doxazosin  4 mg Oral 2 times per day    isosorbide mononitrate  60 mg Oral BID cefepime  1,000 mg IntraVENous Q24H    sodium chloride flush  5-40 mL IntraVENous 2 times per day    [Held by provider] minoxidil  2.5 mg Oral BID    ketamine  100 mg IntraVENous Once    dexamethasone  0.5 mg Oral Daily    docusate sodium  100 mg Oral BID    senna  1 tablet Oral Nightly    aspirin  81 mg Oral Daily    atorvastatin  40 mg Oral Nightly    cloNIDine  0.3 mg Oral TID    famotidine  20 mg Oral Daily    ferrous sulfate  325 mg Oral Daily with breakfast    folic acid  8,967 mcg Oral Daily    fluticasone  1 spray Nasal Daily    sertraline  100 mg Oral Daily    verapamil  240 mg Oral Daily    multivitamin  1 tablet Oral Daily    sodium chloride flush  10 mL IntraVENous 2 times per day     Meds prn: sodium chloride flush, sodium chloride, nicotine polacrilex, oxyCODONE-acetaminophen **OR** oxyCODONE-acetaminophen, morphine **OR** morphine, promethazine **OR** ondansetron, albuterol sulfate HFA, hydrOXYzine HCl, traZODone, sodium chloride flush, sodium chloride, ondansetron **OR** [DISCONTINUED] ondansetron, polyethylene glycol, acetaminophen     Lab Data :  CBC:   Recent Labs     10/12/22  0451 10/13/22  0628 10/14/22  0335   WBC 7.8 8.0 6.0   HGB 9.0* 9.1* 8.2*   HCT 29.5* 29.5* 27.6*   * 134 127*     CMP:  Recent Labs     10/12/22  0451 10/13/22  0628 10/14/22  0335    138 136   K 4.5 4.5 4.1   CL 99 101 99   CO2 23 27 25   BUN 55* 40* 56*   CREATININE 10.0* 7.0* 9.2*   GLUCOSE 97 95 85   CALCIUM 8.3* 9.0 9.0   MG 2.4  --   --    PHOS 4.2  --   --      Hepatic:   No results for input(s): LABALBU, AST, ALT, ALB, BILITOT, ALKPHOS in the last 72 hours. Assessment and Plan:    1. ESRD on HD   -MWF while inpatient, will have dialysis tomorrow    2. Hyperkalemia : improved  3. HTN:better, minoxidil held  4. Loculated pleural effusion: s/p thoracotomy   5. Anemia in CKD : continue KENYETTA  6. Secondary hyperparathyroidism: sensipar, calcitriol  7.   Chronic fluid overload    D/W patient     Mednel Sharma DO [place]  [facility]    This report has been created using voice recognition software.  It may contain minor errors which are inherent in voice recognition technology

## 2022-10-14 NOTE — PROGRESS NOTES
Dr. Lexi Artis updated of this morning CXR finding: trace right apical pneumothorax. No new orders given other than to observe. VSS overnight, no PRN pain medication given, and patient remained on room air all night without desaturations noted.

## 2022-10-14 NOTE — PROGRESS NOTES
Attending supervising physicians attestation statement:       I Discussed the findings and plans with the resident physician  personally   and agree with the IM resident'S note as outlined . Also spoke with the staff  Regarding care plans and recommendations.        Electronically signed by Raiza Talbert MD on 10/14/2022 at 5:18 PM          Internal Medicine Resident Progress Note      Patient:  Esha Christina    YOB: 1965  Unit/Bed:-14/014-A  MRN: [id number]    Acct: [de-identified]   PCP: WADE Borjas - CNP    Date of Admission: 9/28/2022      Assessment/Plan:  Pleural effusion, right  - Multifactorial; likely secondary to hemodialysis noncompliance  - S/p chest tube 09/28, Chest tubes removed on 10/13  - S/p chemical pneumolysis 10/04 with tPA and dornase injection  - S/p bronchoscopy 10/06  - S/p 10/10 Right Thoracotomy & Decortication with Partial Lung Resection; Post operative chest tubes removed on 10/13  - Pleural studies indicating exudative effusion with cytology negative for malignant cells  - Lung resection biopsy showing  negative for malignancy  - Pulmonology following    Acute hypoxic respiratory failure  - Patient maintaining adequate oxygenation on 2L; wean down to room air as tolerated; titrate to maintain SPO2 92 to 96%  - S/p chest tube placement 09/28, chest tube removed on 10/13  - Likely secondary to pleural effusion as well as Hx polysubstance abuse as well as poor lung expansion  - Patient encouraged to use incentive spirometer and Acapella  - S/p 10/10 Right Thoracotomy & Decortication with Partial Lung Resection  - Home O2 eval prior to discharge  - Continue 0.5 mg Decadron per pulmonology    Trapped lung, right  - S/p chest tube 09/28, chest tube removed on 10/13  - S/p chemical pneumolysis 10/04 with tPA and dornase injection  - S/p bronchoscopy 10/06  - 260 cc serosanguineous drainage in the last 24 hours  - Pleural studies indicating exudative effusion with cytology negative for malignant cells  -S/p 10/10 Right Thoracotomy & Decortication with Partial Lung Resection  - Lung resection biopsy showing \"  Pleural fibrosis and fibrinous changes. Diffuse alveolar hemorrhage with abundant intra-alveolar hemosiderin laden macrophages. Negative for malignancy. \"  - Pain management per CT surgery    Right lower lobe infiltrate  - Noted on CTA of the chest on 10/02  - Associated with exudative pleural effusion  - S/p chest tube placement 09/28, chest tube removed on 10/13  - S/p chemical pneumolysis 10/04 with tPA dornase injection  - S/p bronchoscopy 10/06  - Doxycycline discontinued on 10/12; patient started on cefepime  - Respiratory culture positive for Citrobacter koseri  - Incentive spirometry, bronchodilation per pulmonology recommendations  - Repeat imaging to assess for interval changes per pulmonology  - S/p 10/10 Right Thoracotomy & Decortication with Partial Lung Resection  - Follow up in pulmonary clinic in 3 months with CXR;  Home O2 eval prior to discharge     ESRD, on hemodialysis  - Patient on MWF dialysis schedule  - Continue with low phosphorus and low potassium diet  - Nephrology following  - Judicious IV fluids and diuresis per nephrology recommendations  - Strict I's and O's  - S/p albumin infusion 10/07  - Continue sensipar and calcitriol      Hyperkalemia, Resolved  - Currently on MWF dialysis schedule  - Continue low potassium diet  - Trend BMP    Systolic heart failure with preserved ejection fraction  - Echo 9/29 increased left ventricular wall thickness with EF 60%  - Strict I's and O's, daily weights  - Judicious IV fluids and diuresis per nephrology  - Continue Imdur  -10/10 dialysis with 2L fluid removal without complication    Hypertrophic cardiomyopathy  - Echo 9/29 showing severe left ventricular concentric hypertrophy as well as dilation of the left atrium  - Continue aspirin, statin, isosorbide mononitrate, verapamil, minoxidil  - Strict I's and O's, daily weights  - Judicious IV fluids and diuresis per nephrology    Primary HTN with current hypotension  - Continue to monitor vital signs  - Continue with clonidine, verapamil, minoxidil, and doxazosin regimen with hold parameters  -10/13 minoxidil held secondary to current hypotension per nephro recommendations, continuing to hold    PTSD  - Continue sertraline, Atarax, trazodone  - Mood stable    Unspecified depressive disorder  - Continue sertraline  - Mood stable    GERD  - Continue PPI    Polysubstance abuse  - Patient counseled on substance abuse cessation    HLD  - Continue atorvastatin    Anemia of chronic disease  - Trend CBC  - Continue iron and folate supplementation    REZA  - Patient tolerating BiPAP at night  - Recommendation for outpatient follow-up for PAP titration with sleep clinic    Thoracic spondylosis  - Noted on CTA of the chest on 10/02  - PT/OT consulted; 10/05 [facility]     Hyperphosphatemia, resolved  - Currently on MWF dialysis schedule  - Continue low phosphorus diet  - Patient's home phosphorus binder resumed  - Trend phosphorus and BMP      Right pneumothorax, apical, resolved  Pneumothorax, right lower lobe, right, resolved   Apical pneumothorax, right, resolved  Hx Thoracic Aneurysm  Hx AAA s/p aortic stent graft 7/2018  Hx COVID-19 pneumonia  Hx atrial flutter  Hx peripheral vascular disease  Hx hypertensive emergency  - Noted    Expected discharge date: 10/11/2022    Disposition:   [x] Home  [] TCU  [] Rehab  [] Psych  [] SNF  [] [place]  [] Other-    ===================================================================      Chief Complaint: Shortness of breath    Hospital Course: This is a 71-year-old -American male who presented to [address] [place] [address] on 09/28/2022 with worsening shortness of breath at rest and with exertion. CXR showed large right pleural effusion and patient required PAP therapy for adequate oxygenation.   Patient was admitted and chest tube placed on 09/28 with 2 L of serosanguineous fluid drainage. Dialysis also performed at 09/28 with 2 L of fluid. Repeat dialysis performed on 09/29 with albumin administration. Repeat dialysis performed on 09/30/2022. Pleural fluid analysis was consistent with exudative effusion with negative cytology. Repeat CTA of the chest showed improvement in the right-sided pleural effusion, but showed evidence of a right apical pneumothorax which resolved on subsequent imaging. Repeat dialysis performed on 10/03 with 2 L removed. Dornase and alteplase administered through chest tube on 10/04 by intensivist.  Hemodialysis performed on 10/05 with 2 L of fluid removed. Patient started on 30 day course of doxycyline as well as decadron regimen for right sided infiltrate seen on CTA of the chest. Bronchoscopy performed on 80/87 without complication. There was evidence of chronic inflammation with pitting airway disease with no mass identified. Dialysis performed on 10/07 with 0.5 L of fluid removed. Dialysis performed on 10/10 with 2L fluid removed. Right thoracotomy with decortication and partial lung resection was performed without complication. Biopsies performed showed \"Pleural fibrosis and fibrinous changes. Diffuse alveolar hemorrhage with abundant intra-alveolar hemosiderin laden macrophages. Negative for malignancy. \" Dialysis performed on 10/12/2022 with 2L removed. Chest tube removed on 92/99/6378 without complication. Patient transitioned to cefepime after positive respiratory culture for Citrobacter with discontinuation of doxycycline. Subjective (past 24 hours):   Patient seen and evaluated at dialysis. He denies chest pain, fever, chills, nausea, vomiting, and diarrhea. Patient endorses some discomfort with coughing and feeling as though he is producing sputum but he is not expectorating it. He denies any difficulty with eating or ambulation.  He verbalizes understanding of splinting technique. ROS: reviewed complete ROS unchanged unless otherwise stated in hospital course/subjective portion.        Medications:  Reviewed    Infusion Medications    sodium chloride      sodium chloride       Scheduled Medications    calcitRIOL  2.25 mcg Oral Once per day on Mon Wed Fri    cinacalcet  150 mg Oral Once per day on Mon Wed Fri    epoetin qiana-epbx  2,000 Units SubCUTAneous Once per day on Mon Wed Fri    And    epoetin qiana-epbx  2,000 Units SubCUTAneous Once per day on Mon Wed Fri    doxazosin  4 mg Oral 2 times per day    isosorbide mononitrate  60 mg Oral BID    cefepime  1,000 mg IntraVENous Q24H    sodium chloride flush  5-40 mL IntraVENous 2 times per day    [Held by provider] minoxidil  2.5 mg Oral BID    ketamine  100 mg IntraVENous Once    dexamethasone  0.5 mg Oral Daily    docusate sodium  100 mg Oral BID    senna  1 tablet Oral Nightly    aspirin  81 mg Oral Daily    atorvastatin  40 mg Oral Nightly    cloNIDine  0.3 mg Oral TID    famotidine  20 mg Oral Daily    ferrous sulfate  325 mg Oral Daily with breakfast    folic acid  6,184 mcg Oral Daily    fluticasone  1 spray Nasal Daily    sertraline  100 mg Oral Daily    verapamil  240 mg Oral Daily    multivitamin  1 tablet Oral Daily    sodium chloride flush  10 mL IntraVENous 2 times per day     PRN Meds: sodium chloride flush, sodium chloride, nicotine polacrilex, oxyCODONE-acetaminophen **OR** oxyCODONE-acetaminophen, morphine **OR** morphine, promethazine **OR** ondansetron, albuterol sulfate HFA, hydrOXYzine HCl, traZODone, sodium chloride flush, sodium chloride, ondansetron **OR** [DISCONTINUED] ondansetron, polyethylene glycol, acetaminophen        Intake/Output Summary (Last 24 hours) at 10/14/2022 1433  Last data filed at 10/14/2022 1349  Gross per 24 hour   Intake 869.68 ml   Output 1800 ml   Net -930.32 ml       Exam:  BP (!) 147/70   Pulse 100   Temp 97.3 °F (36.3 °C)   Resp 16   Ht 6' 3\" (1.905 m)   Wt 223 lb 12.3 oz (101.5 kg)   SpO2 94%   BMI 27.97 kg/m²     General: No distress, appears stated age. Eyes:  PERRL. Conjunctivae/corneas clear. HENT: Head normal appearing. Nares normal. Oral mucosa moist.  Hearing intact. Neck: Supple, with full range of motion. Trachea midline. No gross JVD appreciated. Respiratory: Normal respiratory effort. No wheezes, crackles, or rhonchi. S/P chest tube removal with no evidence of crepitus, discoloration, or discharge. Abdominal wrap in place. Nasal cannula in place. Cardiovascular: Normal rate, regular rhythm with normal S1/S2 without murmurs. No lower extremity edema. Abdomen: Soft, non-tender, non-distended with normal bowel sounds. Musculoskeletal: No joint swelling or tenderness. Normal tone. No abnormal movements. Protuberance associated with dialysis port in left arm  Skin: Warm and dry. No rashes or lesions. Neurologic:  No focal sensory/motor deficits in the upper or lower extremities. Cranial nerves:  grossly non-focal 2-12. Psychiatric: Alert and oriented, normal insight and thought content. Capillary Refill: Brisk,< 3 seconds. Peripheral Pulses: +2 palpable, equal bilaterally. Labs:   Recent Labs     10/12/22  0451 10/13/22  0628 10/14/22  0335   WBC 7.8 8.0 6.0   HGB 9.0* 9.1* 8.2*   HCT 29.5* 29.5* 27.6*   * 134 127*     Recent Labs     10/12/22  0451 10/13/22  0628 10/14/22  0335    138 136   K 4.5 4.5 4.1   CL 99 101 99   CO2 23 27 25   BUN 55* 40* 56*   CREATININE 10.0* 7.0* 9.2*   CALCIUM 8.3* 9.0 9.0   PHOS 4.2  --   --      No results for input(s): AST, ALT, BILIDIR, BILITOT, ALKPHOS in the last 72 hours. No results for input(s): INR in the last 72 hours. No results for input(s): Sejal Earnest in the last 72 hours. No results for input(s): PROCAL in the last 72 hours.    Lab Results   Component Value Date/Time    NITRU NEGATIVE 04/03/2018 07:45 PM    WBCUA 2-4 04/03/2018 07:45 PM    BACTERIA NONE 04/03/2018 07:45 PM RBCUA 5-10 04/03/2018 07:45 PM    BLOODU SMALL 04/03/2018 07:45 PM    SPECGRAV 1.014 03/07/2018 09:10 PM    GLUCOSEU NEGATIVE 04/03/2018 07:45 PM       Radiology (48 hours):  XR CHEST PORTABLE    Result Date: 10/5/2022  Small pneumothorax at the right lung base. Chest tube tip is at the right lung base, grossly unchanged. Small right pleural effusion, decreased. Right lung base consolidation, increased, suspicious for atelectasis or pneumonia. Cardiomegaly. This document has been electronically signed by: Anne-Marie Canas MD on 10/05/2022 03:41 AM        DVT prophylaxis:    [] Lovenox  [] SCDs  [x] SQ Heparin  [] Encourage ambulation   [] Already on Anticoagulation       Diet: ADULT DIET; Regular; 5 carb choices (75 gm/meal);  Low Sodium (2 gm)  Code Status: Full Code  PT/OT: Consulted      Electronically signed by Kannan Ojeda MD on 10/14/2022 at 2:33 PM    Case was discussed with Attending, Dr. Gaby Cornell

## 2022-10-14 NOTE — PROGRESS NOTES
[facility]    Patient - Katarzyna Nino,  Age - 56 y.o.    - 1965      Room Number - 4K-14/014-A   N -  [id number]   Merged with [facility] # - [[id number]]  Date of Admission -  2022  9:56 AM  Hospital Day - 12  Chief Complaint   Shortness of breath  HPI   Katarzyna Nino is a 56 y.o. male with PMHx of AAA (with repair), ESRD on HD, NIDDM2, hyperparathyroidism with worsening shortness of breath at rest and with exertion. Chest x-ray demonstrated large right pleural effusion and the patient required BiPAP therapy for an adequate oxygenation. On admission a right-sided chest tube was placed with 2 L of serosanguineous fluid drainage and pleural fluid analysis consistent with exudative process and negative cytology. Repeat CTA of the chest demonstrated improvement of the right-sided pleural effusion with evidence of right apical pneumothorax. The patient underwent right-sided chemical pneumolysis on 10/4/2022, bronchoscopy on  without complication, and right-sided thoracotomy with decortication and wedge resection on  with Dr. Nathalie Workman.    The patient has been also undergoing hemodialysis 3 times weekly without complication for his ESRD. Past 24 hours   -Seen in Dialysis unit  -CT removed, CXR this AM with small right apical PTX  -Feels SOB improved, cough becoming more productive   All other systems reviewed   Objective    Vitals   height is 6' 3\" (1.905 m) and weight is 226 lb 6.4 oz (102.7 kg). His temperature is 97.3 °F (36.3 °C). His blood pressure is 123/63 and his pulse is 99. His respiration is 18 and oxygen saturation is 99%. Body mass index is 28.3 kg/m². O2 Flow Rate (L/min): 2 L/min  I/O    Intake/Output Summary (Last 24 hours) at 10/14/2022 1203  Last data filed at 10/14/2022 0735  Gross per 24 hour   Intake 349.68 ml   Output --   Net 349.68 ml       I/O last 3 completed shifts:   In: 409.7 [P.O.:360; IV Piggyback:49.7]  Out: 60 [Chest Tube:60]   Patient Vitals for the past 96 hrs (Last 3 readings):   Weight   10/14/22 0735 226 lb 6.4 oz (102.7 kg)   10/14/22 0339 222 lb 9.6 oz (101 kg)   10/12/22 1205 198 lb 6.6 oz (90 kg)       Exam    Physical Exam   Constitutional: No distress on O2 per NC. Patient appears moderately built and  moderately nourished. Head: Normocephalic and atraumatic. Mouth/Throat: Oropharynx is clear and moist.  No oral thrush. Eyes: Conjunctivae are normal. Pupils are equal, round. No scleral icterus. Neck: Neck supple. No tracheal deviation present. Cardiovascular: S1 and S2 with no murmur. Pulmonary/Chest: Normal effort with bilateral air entry, clear breath sounds. No stridor. No respiratory distress. Dressing noted to R  C/D/I  Abdominal: Soft. Bowel sounds audible.   Musculoskeletal: Moves all extremities  Neurological: Patient is alert and follows simple commands     Meds       calcitRIOL  2.25 mcg Oral Once per day on Mon Wed Fri    cinacalcet  150 mg Oral Once per day on Mon Wed Fri    epoetin qiana-epbx  2,000 Units SubCUTAneous Once per day on Mon Wed Fri    And    epoetin qiana-epbx  2,000 Units SubCUTAneous Once per day on Mon Wed Fri    doxazosin  4 mg Oral 2 times per day    isosorbide mononitrate  60 mg Oral BID    cefepime  1,000 mg IntraVENous Q24H    sodium chloride flush  5-40 mL IntraVENous 2 times per day    [Held by provider] minoxidil  2.5 mg Oral BID    ketamine  100 mg IntraVENous Once    dexamethasone  0.5 mg Oral Daily    docusate sodium  100 mg Oral BID    senna  1 tablet Oral Nightly    aspirin  81 mg Oral Daily    atorvastatin  40 mg Oral Nightly    cloNIDine  0.3 mg Oral TID    famotidine  20 mg Oral Daily    ferrous sulfate  325 mg Oral Daily with breakfast    folic acid  7,709 mcg Oral Daily    fluticasone  1 spray Nasal Daily    sertraline  100 mg Oral Daily    verapamil  240 mg Oral Daily    multivitamin  1 tablet Oral Daily    sodium chloride flush  10 mL IntraVENous 2 times per day      sodium chloride      sodium chloride       sodium chloride flush, sodium chloride, nicotine polacrilex, oxyCODONE-acetaminophen **OR** oxyCODONE-acetaminophen, morphine **OR** morphine, promethazine **OR** ondansetron, albuterol sulfate HFA, hydrOXYzine HCl, traZODone, sodium chloride flush, sodium chloride, ondansetron **OR** [DISCONTINUED] ondansetron, polyethylene glycol, acetaminophen  Lab   ABG  Lab Results   Component Value Date/Time    PH 7.27 09/28/2022 03:20 PM    PO2 101 09/28/2022 03:20 PM    PCO2 64 09/28/2022 03:20 PM    HCO3 29 09/28/2022 03:20 PM    O2SAT 97 09/28/2022 03:20 PM     Lab Results   Component Value Date/Time    IFIO2 40 09/28/2022 03:20 PM    MODE CPAP 03/31/2016 06:54 AM    SETPEEP 6.0 09/28/2022 03:20 PM     CBC  Recent Labs     10/12/22  0451 10/13/22  0628 10/14/22  0335   WBC 7.8 8.0 6.0   RBC 2.87* 2.84* 2.64*   HGB 9.0* 9.1* 8.2*   HCT 29.5* 29.5* 27.6*   .8* 103.9* 104.5*   MCH 31.4 32.0 31.1   MCHC 30.5* 30.8* 29.7*   * 134 127*   MPV 9.9 9.8 10.1        BMP  Recent Labs     10/12/22  0451 10/13/22  0628 10/14/22  0335    138 136   K 4.5 4.5 4.1   CL 99 101 99   CO2 23 27 25   BUN 55* 40* 56*   CREATININE 10.0* 7.0* 9.2*   GLUCOSE 97 95 85   MG 2.4  --   --    PHOS 4.2  --   --    CALCIUM 8.3* 9.0 9.0       LFT  No results for input(s): AST, ALT, ALB, BILITOT, ALKPHOS, LIPASE in the last 72 hours. Invalid input(s): AMYLASE  TROP  Lab Results   Component Value Date/Time    TROPONINT 0.054 09/28/2022 10:00 AM    TROPONINT 0.074 09/02/2020 03:30 AM    TROPONINT 0.059 07/27/2020 03:00 PM     BNP  No results for input(s): BNP in the last 72 hours. Lactic Acid  No results for input(s): LACTA in the last 72 hours. INR  No results for input(s): INR, PROTIME in the last 72 hours. PTT  No results for input(s): APTT in the last 72 hours. Glucose  No results for input(s): POCGLU in the last 72 hours.   UA No results for input(s): SPECGRAV, [address] [facility], [facility], CLARITYU, MUCUS, PROTEINU, BLOODU, RBCUA, WBCUA, BACTERIA, NITRU, GLUCOSEU, BILIRUBINUR, UROBILINOGEN, KETUA, LABCAST, LABCASTTY, AMORPHOS in the last 72 hours. Invalid input(s): CRYSTALS. EKG   10/1/22  alternating sinus and junctional rhythm with occasional PVCs    Left axis deviation  LVH ( Sokolow-Marte , Benson product , Romhilt-Rueda )  Inferior infarct (cited on or before 28-SEP-2022)  Abnormal ECG  When compared with ECG of 28-SEP-2022 10:07,  Significant changes have occurred  Confirmed by Asia Moser ([id number]) on 10/2/2022 1:04:21 PM  Echo   9/29/22  Summary   Left ventricle size is normal.   Severely increased left ventricle wall thickness. Severe concentric left ventricular hypertrophy. Systolic function was normal.   Ejection fraction is visually estimated at 60%. The left atrium is Moderately dilated. Moderately enlarged right atrium size. Mild systolic anterior motion (CASSIE) of anterior leaflet. Moderate LVOT Obstruction   Moderately elevated left ventricular outflow tract velocity 3 m/sec . Moderately elevated Peak left ventricular outflow tract gradient of 36 mmhg .       Signature      ----------------------------------------------------------------   Electronically signed by Alberto Gastelum MD (Interpreting   physician) on 09/29/2022 at 06:56 PM   ----------------------------------------------------------------  Cultures   Procalcitonin   Lab Results   Component Value Date/Time    PROCAL 0.27 07/06/2020 08:22 PM    PROCAL 0.38 03/06/2018 10:46 PM    PROCAL 1.47 09/28/2017 06:18 PM     Blood Cx (9/28/22) - NGTD  Pleural Fluid Fungus (9/29/22) - No fungus or yeast observed  Pleural fluid culture (9/28/22) - Rare segmented neutrophils observed  Molecular Pneumonia Panel (10/6/22) - No organisms detected  Respiratory Culture (10/6/22) - Moderate growth Citrobacter koseri  AFB culture and smear (10/10/22) - No growth for 10 days  Anaerobic and Aerobic lung fluid (10/10/22) - rare segmented neutrophils observed. Radiology   CXR    Chest one view     10/14/2022     FINDINGS: There is pulmonary vascular congestion. There are small bilateral pleural effusions. There is a trace right apical pneumothorax. The cardiac silhouette is enlarged. A thoracic aortic stent graft is noted. Impression:  Interval removal of right chest tubes. There is a trace right apical pneumothorax. Remainder of the examination is grossly unchanged. 1V chest   10/13/2022   Findings: Mediastinum: Stable cardiac silhouette enlargement. Thoracic aortic stent stable. Lungs: Mild improvement right mid and basilar consolidations. Pleura: No pneumothorax or significant effusion. Right chest tubes stable. Bones: No acute pathology. Impression:   No residual pneumothorax visible. Mild improvement right mid and basilar consolidations. Chest X-ray     10/12/2022     Impression:   1. Small residual right pneumothorax, decreased since the prior study. 2. Bilateral pleural effusions. Right basilar airspace consolidation. Findings similar to previous study. CT Scans    CTA CHEST W WO CONTRAST     10/2/2022     FINDINGS: The patient is status post endoluminal graft placement for an aortic dissection  The caliber of the descending thoracic aorta is slightly smaller than on remote study dated 26th of February 2020. There is no definite evidence for an acute endoleak. There is aortic dissection extending into the abdominal aorta. There is cardiomegaly with mitral valve calcification. There is a right-sided chest tube at the base of the right hemithorax. There is a small pneumothorax. There is a small pleural effusion and infiltrate in the right lower lobe. There is mild prominence of the main, right and left pulmonary arteries. This may represent pulmonary hypertension. There is evidence for old granulomatous disease in the right lower lobe, right hilum and mediastinum.   There are small kidneys present bilaterally. Impression:  1. Endoluminal graft in the thoracic aorta. This appears slightly smaller than on remote study dated 2/26/2020. There is no definite evidence of an endoleak. Please correlate clinically. 2. Cardiomegaly. Mitral valve calcification. 3. Right-sided chest tube. Small pneumothorax and effusion at the right lung base. Right lower lobe infiltrate. 4. Evidence for granulomatous disease in the right lower lobe, right hilum and mediastinum. 5. Thoracic spondylosis. 6. Old granulomatous disease in the spleen. 7. Small kidneys bilaterally. (See actual reports for details)    FINAL DIAGNOSIS:   A through E:   Lung, right upper, middle lobe, lower lobe, right middle lobe, right   lower, wedge resections:     Pleural fibrosis and fibrinous changes. Diffuse alveolar hemorrhage with abundant intra-alveolar hemosiderin   laden macrophages. See microscopic. Please clinically correlate. Negative for malignancy. Assessment   -Right side loculated pleural effusion - Multifactorial. S/p chemical pneumolysis on 10/4/22, bronchoscopy on 10/6/22. Exudative pleural studies with negative cytology for malignancy.  -Entrapped right lung - S/p chemical pneumolysis on 10/4/22, bronchoscopy on 10/6/22 and right thoracotomy with decortication and wedge resection 10/10/2022. 3 large bore right chest tube in placed. -Right apical pneumothorax- s/p right thoracotomy with decortication and multiple wedge resections  -Right lower lobe pneumonia-bronchoscopy on 10/6/22 Respiratory culture (+) moderate growth Citrobacter koseri.  -Acute hypoxic respiratory failure - Secondary to above.  Patient does not use supplemental oxygen at home  -Hx of tobacco smoking   -ESRD on HD - M,W,F dialysis  -HFpEF- ECHO 9/29/22 EF=60% with increased LV wall thickness.  -Hx of AAA - s/p aortic stent graft 7/2018  Plan   -Monitor SpO2 wean supplemental O2 to maintain SpO2 >90%  -Cefepime with Pharmacy to dose based on kidney function   -CTS removed Chest Tubes signed off   -CXR this AM with small PTX will obtain CXR in AM to follow   -Pulmonary hygiene with Acapella and IS   -Explained splinting with pillow/towel for coughing   -Will plan for discharge in AM planning outcome of CXR follow-up in pulmonary clinic in 3 months with CXR   -Home O2 eval prior to discharge     Case discussed with nurse and patient  Questions and concerns addressed. Meds and orders reviewed. Electronically signed by   WADE Slaughter CNP on 10/14/2022 at 12:03 PM    Addendum by Dr. Chelsey uHertas MD:  Patient seen by me independently including key components of medical care. Face to face evaluation and examination was performed. Case discussed with Mr. Tank Crow CNP  Italicized font, if present,  represents changes to the note made by me. More than 50% of the encounter time involved with patient care by the Pulmonary & Critical care service team spent by me . Please see my modifications mentioned below:  He is on 2 L of oxygen on nasal cannula  Receiving hemodialysis  Follow-up as above  Continue antibiotics. Michele Lopez educated about my impression and plan. He verbalizes understanding.     Electronically signed by   Khoi Matos MD on 10/14/2022 at 12:52 PM

## 2022-10-14 NOTE — PROGRESS NOTES
CT/CV Surgery Progress Note    10/14/2022 9:13 AM  Surgeon:  Dr. Rui Bush     Subjective: Mr. Saint Host is resting comfortably in dialysis this morning. Pt denies chest pressure, SOB, fever,chills, N/V/D. He is on RA currently. Chest tubes removed yesterday. S/p Right Thoracotomy with decort and wedge resections POD # 4    I/O last 3 completed shifts: In: 409.7 [P.O.:360; IV Piggyback:49.7]  Out: 60 [Chest Tube:60]    Vital Signs: /63   Pulse 99   Temp 97.3 °F (36.3 °C)   Resp 18   Ht 6' 3\" (1.905 m)   Wt 226 lb 6.4 oz (102.7 kg)   SpO2 99%   BMI 28.30 kg/m²    Temp (24hrs), Av.2 °F (36.8 °C), Min:97.3 °F (36.3 °C), Max:98.6 °F (37 °C)    Labs:   CBC:   Recent Labs     10/12/22  0451 10/13/22  0628 10/14/22  0335   WBC 7.8 8.0 6.0   HGB 9.0* 9.1* 8.2*   HCT 29.5* 29.5* 27.6*   .8* 103.9* 104.5*   * 134 127*       BMP:   Recent Labs     10/12/22  0451 10/13/22  0628 10/14/22  0335    138 136   K 4.5 4.5 4.1   CL 99 101 99   CO2 23 27 25   PHOS 4.2  --   --    BUN 55* 40* 56*   CREATININE 10.0* 7.0* 9.2*   MG 2.4  --   --        Imaging:  Chest X-Ray: 10/14/2022      FINDINGS:   There is pulmonary vascular congestion. There are small bilateral pleural    effusions. There is a trace right apical pneumothorax. The cardiac    silhouette is enlarged. A thoracic aortic stent graft is noted. Impression       Interval removal of right chest tubes. There is a trace right apical    pneumothorax. Remainder of the examination is grossly unchanged.        This document has been electronically signed by: Leila Montes MD on    10/14/2022 04:51 AM       Intake/Output Summary (Last 24 hours) at 10/14/2022 0913  Last data filed at 10/13/2022 2643  Gross per 24 hour   Intake 109.68 ml   Output --   Net 109.68 ml       Scheduled Meds:    calcitRIOL  2.25 mcg Oral Once per day on     cinacalcet  150 mg Oral Once per day on     epoetin qiana-epbx  2,000 Units SubCUTAneous Once per day on Mon Wed Fri    And    epoetin qiana-epbx  2,000 Units SubCUTAneous Once per day on Mon Wed Fri    doxazosin  4 mg Oral 2 times per day    isosorbide mononitrate  60 mg Oral BID    cefepime  1,000 mg IntraVENous Q24H    sodium chloride flush  5-40 mL IntraVENous 2 times per day    [Held by provider] minoxidil  2.5 mg Oral BID    ketamine  100 mg IntraVENous Once    dexamethasone  0.5 mg Oral Daily    docusate sodium  100 mg Oral BID    senna  1 tablet Oral Nightly    aspirin  81 mg Oral Daily    atorvastatin  40 mg Oral Nightly    cloNIDine  0.3 mg Oral TID    famotidine  20 mg Oral Daily    ferrous sulfate  325 mg Oral Daily with breakfast    folic acid  3,720 mcg Oral Daily    fluticasone  1 spray Nasal Daily    sertraline  100 mg Oral Daily    verapamil  240 mg Oral Daily    multivitamin  1 tablet Oral Daily    sodium chloride flush  10 mL IntraVENous 2 times per day     ROS: All neg unless specifically mentioned in subjective section. Exam:  General Appearance: alert ,conversing, in no acute distress  Cardiovascular: normal rate, regular rhythm, normal S1 and S2, no murmurs, rubs, clicks, or gallops  Pulmonary/Chest: clear to auscultation bilaterally- no wheezes, rales or rhonchi, normal air movement, no respiratory distress  Neurological: alert, oriented, normal speech, no focal findings or movement disorder noted  Rt posterior chest incision: Incision healing appropriately and no wound dehiscence noted. Chest tubes removed.  Dressing reinforced    Assessment:   Patient Active Problem List   Diagnosis    FSGS (focal segmental glomerulosclerosis)    Diabetes mellitus type 2, controlled ([facility] [address]    Monoclonal (M) protein disease, multiple 'M' protein    Depression    PTSD (post-traumatic stress disorder)    Secondary renal hyperparathyroidism ([address]    Cocaine use    Substance induced mood disorder ([facility] [address]    Renal artery stenosis (HCC) with uncontrollable hypertension    Chronic alcoholism ([facility] [address] Severe cocaine use disorder (HCC)    Uncontrolled hypertension    LVH (left ventricular hypertrophy) due to hypertensive disease    Acute on chronic diastolic CHF (congestive heart failure) ([facility])    REZA (obstructive sleep apnea)    Headache, unspecified headache type    FH: HTN (hypertension)    Hypertrophic cardiomyopathy ([address]    Diet-controlled diabetes mellitus ([address]    A-V fistula (HCC)    Chronic thoracic aortic dissection    Hyperphosphatemia    Anemia in CKD (chronic kidney disease)    Vitamin D deficiency    Thrombocytopenia (HCC)    Personality disorder (HCC)    Tobacco abuse    Metabolic acidosis    Pneumonia due to organism    Precordial pain    ESRD on hemodialysis (HCC)    Edema of upper extremity    ESRD (end stage renal disease) ([facility])    Hyperglycemia    Cocaine abuse, continuous - reports spending $100 on cocaine \"every other day\"    Homelessness    Moderate episode of recurrent major depressive disorder ([address]    Pneumonia    Noncompliance of patient with renal dialysis ([address]    Anemia associated with stage 5 chronic renal failure ([address]    Hypertensive emergency    Cocaine use disorder, severe, dependence ([address]    Alcohol use disorder, severe, dependence ([address]    Abdominal aortic aneurysm (AAA) without rupture    Atrial flutter with rapid ventricular response ([facility])    PVD (peripheral vascular disease) ([facility])    Dyslipidemia    Other fluid overload    Laceration of flexor muscle, fascia and tendon of left thumb at forearm level, initial encounter    Acute respiratory failure with hypoxia and hypercarbia (HCC)    Acute pulmonary edema (HCC)    Macrocytic anemia    History of aortic aneurysm repair    Medical non-compliance    Volume overload    Chest pain    Dyspnea    Pancytopenia (HCC)    Acute febrile illness    Hyponatremia    Hypermagnesemia    History of atrial flutter    History of alcohol abuse    History of cocaine abuse (HCC)    Mild tricuspid regurgitation    Uremia, acute    COVID-19 virus detected    COVID-19    Hypoxia    Trapped lung     Plan:   CXR reviewed- Continue daily CXR's      Continue current therapy. The plan of care was discussed in detail with Dr. Roosevelt March     Electronically signed by Gilmar Gunter PA-C on 10/14/2022 at 9:13 AM     Status post complete decortication of lung  Good reexpansion  May still have a small entrapped portion given residual airspace on chest x-ray but this will resolve over time. The lung will either expand or the space will fill with fluid. Overall the entire lung has been reexpanded.   Chest tubes are out  Signing off please have pt follow-up with pulmonary

## 2022-10-14 NOTE — PLAN OF CARE
Problem: Discharge Planning  Goal: Discharge to home or other facility with appropriate resources  10/14/2022 1405 by Yonis Tierney RN  Outcome: Progressing  Flowsheets (Taken 10/14/2022 1405)  Discharge to home or other facility with appropriate resources:   Identify barriers to discharge with patient and caregiver   Identify discharge learning needs (meds, wound care, etc)   Refer to discharge planning if patient needs post-hospital services based on physician order or complex needs related to functional status, cognitive ability or social support system   Arrange for needed discharge resources and transportation as appropriate     Problem: Pain  Goal: Verbalizes/displays adequate comfort level or baseline comfort level  10/14/2022 1405 by Yonis Tierney RN  Outcome: Progressing  Flowsheets (Taken 10/14/2022 1405)  Verbalizes/displays adequate comfort level or baseline comfort level:   Encourage patient to monitor pain and request assistance   Administer analgesics based on type and severity of pain and evaluate response   Assess pain using appropriate pain scale   Implement non-pharmacological measures as appropriate and evaluate response  Note: Pain goal zero     Problem: Safety - Adult  Goal: Free from fall injury  10/14/2022 1405 by Yonis Tierney RN  Outcome: Progressing  Flowsheets (Taken 10/14/2022 1405)  Free From Fall Injury:   Instruct family/caregiver on patient safety   Based on caregiver fall risk screen, instruct family/caregiver to ask for assistance with transferring infant if caregiver noted to have fall risk factors  Note: Hourly rounding, 1 assist with ambulation, call light within reach, bed and chair alarm in use     Problem: ABCDS Injury Assessment  Goal: Absence of physical injury  10/14/2022 1405 by Yonis Tierney RN  Outcome: Progressing  Flowsheets (Taken 10/14/2022 1405)  Absence of Physical Injury: Implement safety measures based on patient assessment  Note: Hourly rounding, 1 assist with ambulation, call light within reach, bed and chair alarm in use.       Problem: Respiratory - Adult  Goal: Achieves optimal ventilation and oxygenation  10/14/2022 1405 by Yolis Kern RN  Outcome: Progressing  Flowsheets (Taken 10/14/2022 1405)  Achieves optimal ventilation and oxygenation:   Assess for changes in respiratory status   Position to facilitate oxygenation and minimize respiratory effort   Assess and instruct to report shortness of breath or any respiratory difficulty   Assess for changes in mentation and behavior   Oxygen supplementation based on oxygen saturation or arterial blood gases   Encourage broncho-pulmonary hygiene including cough, deep breathe, incentive spirometry     Problem: Cardiovascular - Adult  Goal: Maintains optimal cardiac output and hemodynamic stability  10/14/2022 1405 by Yolis Kern RN  Outcome: Progressing  Flowsheets (Taken 10/14/2022 1405)  Maintains optimal cardiac output and hemodynamic stability:   Monitor blood pressure and heart rate   Assess for signs of decreased cardiac output     Problem: Cardiovascular - Adult  Goal: Absence of cardiac dysrhythmias or at baseline  Recent Flowsheet Documentation  Taken 10/14/2022 1405 by Yolis Kern RN  Absence of cardiac dysrhythmias or at baseline:   Monitor cardiac rate and rhythm   Administer antiarrhythmia medication and electrolyte replacement as ordered   Assess for signs of decreased cardiac output     Problem: Cardiovascular - Adult  Goal: Absence of cardiac dysrhythmias or at baseline  10/14/2022 1405 by Yolis Kern RN  Outcome: Progressing  Flowsheets (Taken 10/14/2022 1405)  Absence of cardiac dysrhythmias or at baseline:   Monitor cardiac rate and rhythm   Administer antiarrhythmia medication and electrolyte replacement as ordered   Assess for signs of decreased cardiac output     Problem: Skin/Tissue Integrity - Adult  Goal: Skin integrity remains intact  10/14/2022 1405 by Heather Ugarte GARLAND Yuan  Outcome: Progressing  Flowsheets  Taken 10/14/2022 1405 by Bret Webber RN  Skin Integrity Remains Intact: Monitor for areas of redness and/or skin breakdown  Taken 10/14/2022 1120 by Bret Webber RN  Skin Integrity Remains Intact: Monitor for areas of redness and/or skin breakdown    Problem: Skin/Tissue Integrity - Adult  Goal: Incisions, wounds, or drain sites healing without S/S of infection  10/14/2022 1405 by Bret Webber RN  Outcome: Progressing  Flowsheets  Taken 10/14/2022 1405 by Bret Webber RN  Incisions, Wounds, or Drain Sites Healing Without Sign and Symptoms of Infection:   Implement wound care per orders   Initiate pressure ulcer prevention bundle as indicated  Taken 10/14/2022 1120 by Bret Webber RN  Incisions, Wounds, or Drain Sites Healing Without Sign and Symptoms of Infection:   ADMISSION and DAILY: Assess and document risk factors for pressure ulcer development   Implement wound care per orders   Initiate pressure ulcer prevention bundle as indicated   TWICE DAILY: Assess and document skin integrity    Problem: Skin/Tissue Integrity - Adult  Goal: Oral mucous membranes remain intact  Recent Flowsheet Documentation  Taken 10/14/2022 1120 by Bret Webber RN  Oral Mucous Membranes Remain Intact:   Assess oral mucosa and hygiene practices   Implement preventative oral hygiene regimen   Implement oral medicated treatments as ordered     Problem: Infection - Adult  Goal: Absence of infection at discharge  10/14/2022 1405 by Bret Webber RN  Outcome: Progressing  Flowsheets (Taken 10/14/2022 1405)  Absence of infection at discharge:   Assess and monitor for signs and symptoms of infection   Monitor lab/diagnostic results   Monitor all insertion sites i.e., indwelling lines, tubes and drains   Administer medications as ordered   Instruct and encourage patient and family to use good hand hygiene technique     Problem: Infection - Adult  Goal: Absence of infection during hospitalization  Recent Flowsheet Documentation  Taken 10/14/2022 1405 by Leanna Mejia RN  Absence of infection during hospitalization:   Assess and monitor for signs and symptoms of infection   Monitor lab/diagnostic results   Monitor all insertion sites i.e., indwelling lines, tubes and drains   Administer medications as ordered   Instruct and encourage patient and family to use good hand hygiene technique     Problem: Infection - Adult  Goal: Absence of infection during hospitalization  10/14/2022 1405 by Leanna Mejia RN  Outcome: Progressing  Flowsheets (Taken 10/14/2022 1405)  Absence of infection during hospitalization:   Assess and monitor for signs and symptoms of infection   Monitor lab/diagnostic results   Monitor all insertion sites i.e., indwelling lines, tubes and drains   Administer medications as ordered   Instruct and encourage patient and family to use good hand hygiene technique     Problem: Metabolic/Fluid and Electrolytes - Adult  Goal: Electrolytes maintained within normal limits  10/14/2022 1405 by Leanna Mjeia RN  Outcome: Progressing  Flowsheets (Taken 10/14/2022 1405)  Electrolytes maintained within normal limits:   Monitor labs and assess patient for signs and symptoms of electrolyte imbalances   Administer electrolyte replacement as ordered   Fluid restriction as ordered   Monitor response to electrolyte replacements, including repeat lab results as appropriate   Instruct patient on fluid and nutrition restrictions as appropriate     Problem: Metabolic/Fluid and Electrolytes - Adult  Goal: Hemodynamic stability and optimal renal function maintained  10/14/2022 1405 by Leanna Mejia RN  Outcome: Progressing  Flowsheets (Taken 10/14/2022 1405)  Hemodynamic stability and optimal renal function maintained:   Monitor labs and assess for signs and symptoms of volume excess or deficit   Monitor intake, output and patient weight   Monitor response to interventions for patient's volume status, including labs, urine output, blood pressure (other measures as available)   Encourage oral intake as appropriate   Instruct patient on fluid and nutrition restrictions as appropriate     Problem: Hematologic - Adult  Goal: Maintains hematologic stability  10/14/2022 1405 by Tommy Lee RN  Outcome: Progressing  Flowsheets (Taken 10/14/2022 1405)  Maintains hematologic stability:   Assess for signs and symptoms of bleeding or hemorrhage   Monitor labs for bleeding or clotting disorders     Problem: Neurosensory - Adult  Goal: Achieves maximal functionality and self care  10/14/2022 1405 by Tommy Lee RN  Outcome: Progressing  Flowsheets (Taken 10/14/2022 1405)  Achieves maximal functionality and self care:   Monitor swallowing and airway patency with patient fatigue and changes in neurological status   Encourage and assist patient to increase activity and self care with guidance from physical therapy/occupational therapy     Problem: Neurosensory - Adult  Goal: Achieves stable or improved neurological status  10/14/2022 1405 by Tommy Lee RN  Outcome: Progressing  Flowsheets (Taken 10/14/2022 1405)  Achieves stable or improved neurological status:   Assess for and report changes in neurological status   Maintain blood pressure and fluid volume within ordered parameters to optimize cerebral perfusion and minimize risk of hemorrhage   Monitor temperature, glucose, and sodium.  Initiate appropriate interventions as ordered     Problem: Musculoskeletal - Adult  Goal: Return mobility to safest level of function  10/14/2022 1405 by Tommy Lee RN  Outcome: Progressing  Flowsheets (Taken 10/14/2022 1405)  Return Mobility to Safest Level of Function:   Assess patient stability and activity tolerance for standing, transferring and ambulating with or without assistive devices   Assist with transfers and ambulation using safe patient handling equipment as needed   Ensure adequate protection for wounds/incisions during mobilization   Obtain physical therapy/occupational therapy consults as needed   Instruct patient/family in ordered activity level     Problem: Musculoskeletal - Adult  Goal: Maintain proper alignment of affected body part  10/14/2022 1405 by Hannah Matt RN  Outcome: Progressing  Flowsheets (Taken 10/14/2022 1405)  Maintain proper alignment of affected body part:   Support and protect limb and body alignment per provider's orders   Instruct and reinforce with patient and family use of appropriate assistive device and precautions (e.g. spinal or hip dislocation precautions)     Problem: Musculoskeletal - Adult  Goal: Return ADL status to a safe level of function  10/14/2022 1405 by Hannah Matt RN  Outcome: Progressing  Flowsheets (Taken 10/14/2022 1405)  Return ADL Status to a Safe Level of Function:   Administer medication as ordered   Assess activities of daily living deficits and provide assistive devices as needed   Assist and instruct patient to increase activity and self care as tolerated   Obtain physical therapy/occupational therapy consults as needed     Problem: Gastrointestinal - Adult  Goal: Maintains or returns to baseline bowel function  10/14/2022 1405 by Hannah Matt RN  Outcome: Progressing  Flowsheets (Taken 10/14/2022 1405)  Maintains or returns to baseline bowel function:   Assess bowel function   Encourage oral fluids to ensure adequate hydration   Administer ordered medications as needed   Encourage mobilization and activity   Nutrition consult to assist patient with appropriate food choices     Problem: Gastrointestinal - Adult  Goal: Maintains adequate nutritional intake  10/14/2022 1405 by Hannah Matt RN  Outcome: Progressing  Flowsheets (Taken 10/14/2022 1405)  Maintains adequate nutritional intake:   Monitor percentage of each meal consumed   Identify factors contributing to decreased intake, treat as appropriate   Monitor intake and output, weight and lab values     Problem: Genitourinary - Adult  Goal: Absence of urinary retention  10/14/2022 1405 by Michael Torres RN  Outcome: Progressing  Flowsheets (Taken 10/14/2022 1405)  Absence of urinary retention: Assess patients ability to void and empty bladder     Problem: Chronic Conditions and Co-morbidities  Goal: Patient's chronic conditions and co-morbidity symptoms are monitored and maintained or improved  10/14/2022 1405 by Michael Torres RN  Outcome: Progressing  Flowsheets (Taken 10/14/2022 1405)  Care Plan - Patient's Chronic Conditions and Co-Morbidity Symptoms are Monitored and Maintained or Improved:   Monitor and assess patient's chronic conditions and comorbid symptoms for stability, deterioration, or improvement   Collaborate with multidisciplinary team to address chronic and comorbid conditions and prevent exacerbation or deterioration   Update acute care plan with appropriate goals if chronic or comorbid symptoms are exacerbated and prevent overall improvement and discharge   Note: Monitor and document q4 hr with assessments   Note: Patient educated on q2hr turns, monitor for new skin breakdown. Care plan reviewed with patient and wife. Patient and wife verbalize understanding of the plan of care and contribute to goal setting.

## 2022-10-15 ENCOUNTER — APPOINTMENT (OUTPATIENT)
Dept: GENERAL RADIOLOGY | Age: 57
DRG: 163 | End: 2022-10-15
Payer: MEDICARE

## 2022-10-15 VITALS
HEART RATE: 70 BPM | SYSTOLIC BLOOD PRESSURE: 131 MMHG | OXYGEN SATURATION: 94 % | HEIGHT: 75 IN | TEMPERATURE: 97.8 F | DIASTOLIC BLOOD PRESSURE: 67 MMHG | BODY MASS INDEX: 26.56 KG/M2 | RESPIRATION RATE: 16 BRPM | WEIGHT: 213.63 LBS

## 2022-10-15 PROBLEM — Z98.890 STATUS POST THORACOTOMY: Status: ACTIVE | Noted: 2022-10-15

## 2022-10-15 LAB
AEROBIC CULTURE: ABNORMAL
AEROBIC CULTURE: ABNORMAL
ANAEROBIC CULTURE: ABNORMAL
ANION GAP SERPL CALCULATED.3IONS-SCNC: 10 MEQ/L (ref 8–16)
BUN BLDV-MCNC: 31 MG/DL (ref 7–22)
CALCIUM SERPL-MCNC: 8.4 MG/DL (ref 8.5–10.5)
CHLORIDE BLD-SCNC: 103 MEQ/L (ref 98–111)
CO2: 25 MEQ/L (ref 23–33)
CREAT SERPL-MCNC: 6.6 MG/DL (ref 0.4–1.2)
ERYTHROCYTE [DISTWIDTH] IN BLOOD BY AUTOMATED COUNT: 13.2 % (ref 11.5–14.5)
ERYTHROCYTE [DISTWIDTH] IN BLOOD BY AUTOMATED COUNT: 50.5 FL (ref 35–45)
GFR SERPL CREATININE-BSD FRML MDRD: 11 ML/MIN/1.73M2
GLUCOSE BLD-MCNC: 87 MG/DL (ref 70–108)
GLUCOSE BLD-MCNC: 89 MG/DL (ref 70–108)
GRAM STAIN RESULT: ABNORMAL
HCT VFR BLD CALC: 26.3 % (ref 42–52)
HEMOGLOBIN: 8 GM/DL (ref 14–18)
MCH RBC QN AUTO: 31.7 PG (ref 26–33)
MCHC RBC AUTO-ENTMCNC: 30.4 GM/DL (ref 32.2–35.5)
MCV RBC AUTO: 104.4 FL (ref 80–94)
ORGANISM: ABNORMAL
PLATELET # BLD: 146 THOU/MM3 (ref 130–400)
PMV BLD AUTO: 9.7 FL (ref 9.4–12.4)
POTASSIUM SERPL-SCNC: 4.4 MEQ/L (ref 3.5–5.2)
RBC # BLD: 2.52 MILL/MM3 (ref 4.7–6.1)
SODIUM BLD-SCNC: 138 MEQ/L (ref 135–145)
WBC # BLD: 5.5 THOU/MM3 (ref 4.8–10.8)

## 2022-10-15 PROCEDURE — 6370000000 HC RX 637 (ALT 250 FOR IP)

## 2022-10-15 PROCEDURE — 36415 COLL VENOUS BLD VENIPUNCTURE: CPT

## 2022-10-15 PROCEDURE — 82948 REAGENT STRIP/BLOOD GLUCOSE: CPT

## 2022-10-15 PROCEDURE — 80048 BASIC METABOLIC PNL TOTAL CA: CPT

## 2022-10-15 PROCEDURE — 99239 HOSP IP/OBS DSCHRG MGMT >30: CPT | Performed by: INTERNAL MEDICINE

## 2022-10-15 PROCEDURE — 90935 HEMODIALYSIS ONE EVALUATION: CPT

## 2022-10-15 PROCEDURE — 2700000000 HC OXYGEN THERAPY PER DAY

## 2022-10-15 PROCEDURE — 90935 HEMODIALYSIS ONE EVALUATION: CPT | Performed by: INTERNAL MEDICINE

## 2022-10-15 PROCEDURE — 6360000002 HC RX W HCPCS: Performed by: INTERNAL MEDICINE

## 2022-10-15 PROCEDURE — 6370000000 HC RX 637 (ALT 250 FOR IP): Performed by: STUDENT IN AN ORGANIZED HEALTH CARE EDUCATION/TRAINING PROGRAM

## 2022-10-15 PROCEDURE — 6370000000 HC RX 637 (ALT 250 FOR IP): Performed by: PHYSICIAN ASSISTANT

## 2022-10-15 PROCEDURE — 94761 N-INVAS EAR/PLS OXIMETRY MLT: CPT

## 2022-10-15 PROCEDURE — 6370000000 HC RX 637 (ALT 250 FOR IP): Performed by: INTERNAL MEDICINE

## 2022-10-15 PROCEDURE — 71046 X-RAY EXAM CHEST 2 VIEWS: CPT

## 2022-10-15 PROCEDURE — 2580000003 HC RX 258: Performed by: PHYSICIAN ASSISTANT

## 2022-10-15 PROCEDURE — 85027 COMPLETE CBC AUTOMATED: CPT

## 2022-10-15 RX ORDER — ALBUTEROL SULFATE 90 UG/1
2 AEROSOL, METERED RESPIRATORY (INHALATION) EVERY 6 HOURS PRN
Qty: 18 G | Refills: 0 | Status: SHIPPED | OUTPATIENT
Start: 2022-10-15

## 2022-10-15 RX ORDER — CALCITRIOL 0.25 UG/1
2.25 CAPSULE, LIQUID FILLED ORAL
Qty: 30 CAPSULE | Refills: 0 | Status: SHIPPED | OUTPATIENT
Start: 2022-10-17

## 2022-10-15 RX ORDER — ISOSORBIDE MONONITRATE 60 MG/1
60 TABLET, EXTENDED RELEASE ORAL 2 TIMES DAILY
Qty: 30 TABLET | Refills: 3 | Status: SHIPPED | OUTPATIENT
Start: 2022-10-15

## 2022-10-15 RX ORDER — CIPROFLOXACIN 500 MG/1
500 TABLET, FILM COATED ORAL DAILY
Qty: 5 TABLET | Refills: 0 | Status: SHIPPED | OUTPATIENT
Start: 2022-10-15 | End: 2022-10-20

## 2022-10-15 RX ORDER — CINACALCET 30 MG/1
150 TABLET, FILM COATED ORAL
Qty: 30 TABLET | Refills: 0 | Status: SHIPPED | OUTPATIENT
Start: 2022-10-17

## 2022-10-15 RX ORDER — DOXAZOSIN MESYLATE 4 MG/1
4 TABLET ORAL EVERY 12 HOURS SCHEDULED
Qty: 30 TABLET | Refills: 0 | Status: SHIPPED | OUTPATIENT
Start: 2022-10-15

## 2022-10-15 RX ORDER — OXYCODONE HYDROCHLORIDE AND ACETAMINOPHEN 5; 325 MG/1; MG/1
1 TABLET ORAL EVERY 6 HOURS PRN
Qty: 12 TABLET | Refills: 0 | Status: SHIPPED | OUTPATIENT
Start: 2022-10-15 | End: 2022-10-18

## 2022-10-15 RX ADMIN — CLONIDINE HYDROCHLORIDE 0.3 MG: 0.2 TABLET ORAL at 09:02

## 2022-10-15 RX ADMIN — FAMOTIDINE 20 MG: 20 TABLET ORAL at 09:02

## 2022-10-15 RX ADMIN — Medication 1 TABLET: at 09:02

## 2022-10-15 RX ADMIN — OXYCODONE AND ACETAMINOPHEN 2 TABLET: 5; 325 TABLET ORAL at 15:29

## 2022-10-15 RX ADMIN — FERROUS SULFATE TAB 325 MG (65 MG ELEMENTAL FE) 325 MG: 325 (65 FE) TAB at 09:03

## 2022-10-15 RX ADMIN — VERAPAMIL HYDROCHLORIDE 240 MG: 240 TABLET, FILM COATED, EXTENDED RELEASE ORAL at 09:17

## 2022-10-15 RX ADMIN — DEXAMETHASONE 0.5 MG: 0.5 TABLET ORAL at 09:19

## 2022-10-15 RX ADMIN — SERTRALINE 100 MG: 100 TABLET, FILM COATED ORAL at 09:03

## 2022-10-15 RX ADMIN — OXYCODONE AND ACETAMINOPHEN 2 TABLET: 5; 325 TABLET ORAL at 09:21

## 2022-10-15 RX ADMIN — FOLIC ACID 1000 MCG: 1 TABLET ORAL at 09:15

## 2022-10-15 RX ADMIN — DOCUSATE SODIUM 100 MG: 100 CAPSULE, LIQUID FILLED ORAL at 09:21

## 2022-10-15 RX ADMIN — ISOSORBIDE MONONITRATE 60 MG: 60 TABLET, EXTENDED RELEASE ORAL at 09:03

## 2022-10-15 RX ADMIN — SODIUM CHLORIDE, PRESERVATIVE FREE 10 ML: 5 INJECTION INTRAVENOUS at 09:03

## 2022-10-15 RX ADMIN — ASPIRIN 81 MG: 81 TABLET, COATED ORAL at 09:02

## 2022-10-15 RX ADMIN — DOXAZOSIN 4 MG: 4 TABLET ORAL at 09:03

## 2022-10-15 ASSESSMENT — PAIN DESCRIPTION - DESCRIPTORS
DESCRIPTORS: ACHING
DESCRIPTORS: SORE;TENDER

## 2022-10-15 ASSESSMENT — PAIN DESCRIPTION - ORIENTATION
ORIENTATION: RIGHT
ORIENTATION: RIGHT;LOWER

## 2022-10-15 ASSESSMENT — PAIN DESCRIPTION - LOCATION
LOCATION: ABDOMEN
LOCATION: CHEST

## 2022-10-15 ASSESSMENT — PAIN SCALES - GENERAL
PAINLEVEL_OUTOF10: 0
PAINLEVEL_OUTOF10: 7
PAINLEVEL_OUTOF10: 8

## 2022-10-15 NOTE — FLOWSHEET NOTE
10/15/22 1030 10/15/22 1457   Vital Signs   /66 131/67   Temp 98 °F (36.7 °C) 97.8 °F (36.6 °C)   Heart Rate 81 67   Resp 14 18   SpO2 92 % 97 %   Weight 216 lb 4.3 oz (98.1 kg) 213 lb 10 oz (96.9 kg)   Weight Method Bed scale Bed scale   Percent Weight Change -3.45 -1.22   Post-Hemodialysis Assessment   Post-Treatment Procedures  --  Blood returned; Access bleeding time < 10 minutes   Machine Disinfection Process  --  Acid/Vinegar Clean;Heat Disinfect; Exterior Machine Disinfection   Blood Volume Processed (Liters)  --  92.4 l/min   Dialyzer Clearance  --  Lightly streaked   Duration of Treatment (minutes)  --  240 minutes   Heparin Amount Administered During Treatment (mL)  --  0 mL   Hemodialysis Intake (ml)  --  400 ml   Hemodialysis Output (ml)  --  1600 ml   NET Removed (ml)  --  1200   Tolerated Treatment  --  Good   Stable 4 hour treatment complete. Removed 1.2 liters of fluid as per order. Some Hypotension noted during tx. Pressure held to needle sites times ten minutes each. Dressing clean, dry and intact. Report given to primary RN. Treatment record printed for scanning into EMR.

## 2022-10-15 NOTE — PROGRESS NOTES
Patient down to HD to get back on regular outpatient schedule.   Pt ok with this; requested to look into his discharge planning; I.e. hosp bed, oxygen, etc

## 2022-10-15 NOTE — PLAN OF CARE
Patient was evaluated today for the diagnosis of  acute hypoxic respiratory failure secondary to trapped lung and volume overload . I entered a DME order for home oxygen because the diagnosis and testing requires the patient to have supplemental oxygen. Condition will improve or be benefited by oxygen use. The patient is  able to perform good mobility in a home setting and therefore does require the use of a portable oxygen system. The need for this equipment was discussed with the patient and he understands and is in agreement. Per respiratory therapist documentation of 6 minute walk test:    \"Patient was placed on room air for 3 minutes. SpO2 was 88 % on room air at rest. Patients SpO2 was below 89% and qualified for home oxygen. Oxygen was applied at 2 lpm via nasal cannula to maintain a SpO2 between 90-92% while at rest. Actual SpO2 was 94 %. Patient can ambulate for exercise flow rate. Patients was ambulated, SpO2 was 93% on 6 lpm to maintain SpO2 between 90-92% while exercising. \"    -Lisa Griffith MD    Pt seen and agree with above note and recommendations.      Electronically signed by Yokasta Lopez MD on 10/15/2022 at 5:15 PM Elidel Counseling: Patient may experience a mild burning sensation during topical application. Elidel is not approved in children less than 2 years of age. There have been case reports of hematologic and skin malignancies in patients using topical calcineurin inhibitors although causality is questionable.

## 2022-10-15 NOTE — DISCHARGE SUMMARY
Internal Medicine Resident Discharge Summary      Patient Identification:   Lopez Zafar   : 1965  MRN: [id number]   Account: [de-identified]      Patient's PCP: WADE Negron CNP    Admit Date: 2022     Discharge Date:   10/15/2022    Admitting Physician: Talia Palumbo PA-C     Discharge Physician: Alessio Hooper MD       Hospital Course: This is a 79-year-old -American male who presented to [address] [place] [address] on 2022 with worsening shortness of breath at rest and with exertion. CXR showed large right pleural effusion and patient required PAP therapy for adequate oxygenation. Patient was admitted and chest tube placed on  with 2 L of serosanguineous fluid drainage. Dialysis also performed at  with 2 L of fluid. Repeat dialysis performed on  with albumin administration. Repeat dialysis performed on 2022. Pleural fluid analysis was consistent with exudative effusion with negative cytology. Repeat CTA of the chest showed improvement in the right-sided pleural effusion, but showed evidence of a right apical pneumothorax which resolved on subsequent imaging. Repeat dialysis performed on 10/03 with 2 L removed. Dornase and alteplase administered through chest tube on 10/04 by intensivist.  Hemodialysis performed on 10/05 with 2 L of fluid removed. Patient started on 30 day course of doxycyline as well as decadron regimen for right sided infiltrate seen on CTA of the chest. Bronchoscopy performed on  without complication. There was evidence of chronic inflammation with pitting airway disease with no mass identified. Dialysis performed on 10/07 with 0.5 L of fluid removed. Dialysis performed on 10/10 with 2L fluid removed. Right thoracotomy with decortication and partial lung resection was performed without complication. Biopsies performed showed \"Pleural fibrosis and fibrinous changes.  Diffuse alveolar hemorrhage with abundant intra-alveolar hemosiderin laden macrophages. Negative for malignancy. \" Dialysis performed on 10/12/2022 with 2L removed. Chest tube removed on 80/28/0804 without complication. Patient transitioned to cefepime after positive respiratory culture for Citrobacter with discontinuation of doxycycline. Dialysis performed on 10/14/2022 with 1.4 liters removed. Repeat dialysis performed on 10/15/2022 with 1.2 liters removed without complication. CXR performed on day of discharge was stable compared to prior imaging. Patient was discharged in stable condition with Ciprofloxacin 500mg once daily for 5 days. He was given a DME order for a walker and home O2 and he was discharged in stable condition on 10/15/2022. His home minoxidil was held at discharge. Discharge Diagnoses:  Right pleural effusion  Acute hypoxic respiratory failure secondary to right trapped lung and volume overload secondary to ESRD  Right trapped lung s/p thoracotomy, s/p chemical pneumolysis  Right lower lobe infiltrate secondary to bacterial pneumonia with Citrobacter Koseri growth  ESRD, on TTS hemodialysis  Hyperkalemia  Hyperphosphatemia  Secondary Hyperparathyroidism  Systolic Heart Failure with preserved ejection fraction  Primary HTN with current hypotension  PTSD  GERD  Unspecified depressive disorder  Hx polysubstance abuse  HLD  Anemia of Chronic Disease  REZA  Thoracic Spondylosis  Right pneumothorax, apical, resolved  Pneumothorax, right lower lobe, right, resolved   Hx Thoracic Aneurysm  Hx AAA s/p aortic stent graft 7/2018  Hx COVID-19 pneumonia  Hx atrial flutter  Hx peripheral vascular disease  Hx hypertensive emergency    The patient was seen and examined on day of discharge and this discharge summary is in conjunction with any daily progress note from day of discharge. The patient is discharged in stable condition.      Exam:     Vitals:  Vitals:    10/15/22 0951 10/15/22 1030 10/15/22 1457 10/15/22 1602   BP:  130/66 131/67    Pulse:  81 67 70   Resp: 16 14 18 16   Temp:  98 °F (36.7 °C) 97.8 °F (36.6 °C)    TempSrc:       SpO2:  92% 97% 94%   Weight:  216 lb 4.3 oz (98.1 kg) 213 lb 10 oz (96.9 kg)    Height:         Weight: Weight: 213 lb 10 oz (96.9 kg)     24 hour intake/output:  Intake/Output Summary (Last 24 hours) at 10/15/2022 1635  Last data filed at 10/15/2022 1457  Gross per 24 hour   Intake 550 ml   Output 1600 ml   Net -1050 ml       General appearance: No apparent distress, well developed, appears stated age. Eyes:  Pupils equal, round, and reactive to light. Conjunctivae/corneas clear. HENT: Head normal in appearance. External nares normal.  Oral mucosa moist without lesions. Hearing grossly intact. Neck: Supple, with full range of motion. Trachea midline. No gross JVD appreciated. Respiratory:  Normal respiratory effort. Clear to auscultation, bilaterally without rales or wheezes or rhonchi. Nasal cannula in place. Cardiovascular: Normal rate, regular rhythm with normal S1/S2 without murmurs. No lower extremity edema. Abdomen: Soft, non-tender, non-distended with normal bowel sounds. Musculoskeletal: There is no joint swelling or tenderness. Normal tone. No abnormal movements. Dialysis access site noted in left extremity  Skin: Warm and dry. No rashes or lesions. Neurologic:  No focal sensory/motor deficits in the upper and lower extremities. Cranial nerves:  grossly non-focal 2-12. Psychiatric: Alert and oriented, normal insight and thought content. Capillary Refill: Brisk,< 3 seconds. Peripheral Pulses: +2 palpable, equal bilaterally. Labs:  For convenience and continuity at follow-up the following most recent labs are provided:    CBC:    Lab Results   Component Value Date/Time    WBC 5.5 10/15/2022 03:46 AM    HGB 8.0 10/15/2022 03:46 AM    HCT 26.3 10/15/2022 03:46 AM     10/15/2022 03:46 AM       Renal:    Lab Results   Component Value Date/Time     10/15/2022 03:46 AM    K 4.4 10/15/2022 03:46 AM    K 5.2 09/29/2022 04:46 AM     10/15/2022 03:46 AM    CO2 25 10/15/2022 03:46 AM    BUN 31 10/15/2022 03:46 AM    CREATININE 6.6 10/15/2022 03:46 AM    CALCIUM 8.4 10/15/2022 03:46 AM    PHOS 4.2 10/12/2022 04:51 AM         Significant Diagnostic Studies    Radiology:   XR CHEST (2 VW)   Final Result      1. Redemonstration of bandlike opacities at the right lung base with small air-fluid levels in the right middle lobe region, stable compared to prior exam.   2. No definite residual pneumothorax identified. **This report has been created using voice recognition software. It may contain minor errors which are inherent in voice recognition technology. **      Final report electronically signed by Dr. Aki Garcia MD on 10/15/2022 7:45 AM      XR CHEST PORTABLE   Final Result      Interval removal of right chest tubes. There is a trace right apical    pneumothorax. Remainder of the examination is grossly unchanged. This document has been electronically signed by: Fish Lyon MD on    10/14/2022 04:51 AM         XR CHEST PORTABLE   Final Result   Impression:   No residual pneumothorax visible. Mild improvement right mid and basilar consolidations. This document has been electronically signed by: Xiomara Cool MD on    10/13/2022 05:03 AM      XR CHEST PORTABLE   Final Result   Impression:   1. Small residual right pneumothorax, decreased since the prior study. 2. Bilateral pleural effusions. Right basilar airspace consolidation. Findings similar to previous study. This document has been electronically signed by: Desmond Santiago MD on    10/12/2022 03:08 AM      XR CHEST PORTABLE   Final Result   Stable appearance of the chest with a right-sided apical pneumothorax and small bilateral pleural effusions. **This report has been created using voice recognition software. It may contain minor errors which are inherent in voice recognition technology. **      Final report electronically signed by Dr Jenifer Wells on 10/11/2022 8:17 AM      XR CHEST PORTABLE   Final Result   Impression:   1. Right pneumothorax, with estimated volume of approximately 10%, new    since the prior study. 2. Small bilateral pleural effusions, increased since the prior study. This document has been electronically signed by: Guera Messina MD on    10/10/2022 09:10 PM         XR CHEST PORTABLE   Final Result   Impression:   1. RIGHT lung base opacity is unchanged from previous exam, considering    slight differences in positioning   2. No pneumothorax. This document has been electronically signed by: Earl Wakefield MD on    10/10/2022 03:51 AM      XR CHEST PORTABLE   Final Result   1. Unchanged position of medium bore right chest tube coiled within the    right lung base. Increasing size of right basilar pleural effusion now    moderate/large. Consolidation throughout the right lung base also slightly    progressed/worsened. This document has been electronically signed by: Diane Heard DO on    10/09/2022 02:55 AM      XR CHEST PORTABLE   Final Result      No significant interval change. This document has been electronically signed by: Antonio Evangelista MD on    10/08/2022 04:55 AM      XR CHEST PORTABLE   Final Result   No significant interval change. This document has been electronically signed by: Linda Mcmahon MD on    10/07/2022 07:42 PM      XR CHEST PORTABLE   Final Result      Small pneumothorax at the right lung base. Chest tube tip is at the right    lung base, grossly unchanged. Small right pleural effusion, decreased. Right lung base consolidation, increased, suspicious for atelectasis or    pneumonia. Cardiomegaly. This document has been electronically signed by: Antonio Evangelista MD on    10/05/2022 03:41 AM         XR CHEST PORTABLE   Final Result   1. Persistent pleural-parenchymal changes in the right mid and lower lung zone. There is a chest tube in place. No pneumothorax is identified on today's film. 2. Hazy appearance to the left mid and lower lung zone. This is unchanged. **This report has been created using voice recognition software. It may contain minor errors which are inherent in voice recognition technology. **      Final report electronically signed by Dr. Viviane Bond on 10/4/2022 7:57 AM      XR CHEST PORTABLE   Final Result   1. Aneurysmal aortic arch. An aortic endograft is present in the aortic arch and proximal descending thoracic aorta. 2. Moderate cardiomegaly. Small caliber pigtail catheter in the pleural space right side. Small pleural effusion right side. Moderate right basilar atelectasis/pneumonia. 3. Small less than 10% pneumothorax right apex. Lucyann Push **This report has been created using voice recognition software. It may contain minor errors which are inherent in voice recognition technology. **      Final report electronically signed by Dr. Alvaro Stuart on 10/3/2022 1:38 PM      CTA CHEST W WO CONTRAST   Final Result       1. Endoluminal graft in the thoracic aorta. This appears slightly smaller than on remote study dated 2/26/2020. There is no definite evidence of an endoleak. Please correlate clinically. 2. Cardiomegaly. Mitral valve calcification. 3. Right-sided chest tube. Small pneumothorax and effusion at the right lung base. Right lower lobe infiltrate. 4. Evidence for granulomatous disease in the right lower lobe, right hilum and mediastinum. 5. Thoracic spondylosis. 6. Old granulomatous disease in the spleen. 7. Small kidneys bilaterally. **This report has been created using voice recognition software. It may contain minor errors which are inherent in voice recognition technology. **      Final report electronically signed by DR Joseph Gates on 10/2/2022 11:48 AM      XR CHEST PORTABLE   Final Result   Impression:   Stable moderate right and small left effusions.  No definite residual pneumothorax. Stable right basilar atelectasis vs infiltrate. This document has been electronically signed by: Lucius Zacarias MD on    10/02/2022 05:33 AM      XR CHEST PORTABLE   Final Result   1. Stable chest x-ray, no interval change since previous study dated 28th of September 2022. Loreatha Press **This report has been created using voice recognition software. It may contain minor errors which are inherent in voice recognition technology. **      Final report electronically signed by DR Shanice Jennings on 10/1/2022 11:48 AM      CT CHEST W CONTRAST   Final Result   1. A small right pneumothorax with a pigtail catheter catheter within it. Small residual right pleural effusion. 2. Status post endograft placement within the thoracic aorta. 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 A CT angiography of the chest without and with contrast    is recommended to better evaluate whether these represent calcifications or endoleak. 3. Masslike abnormalities are seen in the right lower lobe and right middle lobe that represent rounded atelectasis. 4. Other findings as described above. **This report has been created using voice recognition software. It may contain minor errors which are inherent in voice recognition technology. **      Final report electronically signed by Dr Cleve Forte on 9/30/2022 10:27 AM      XR CHEST PORTABLE   Final Result   1. New right-sided chest tube in place. Diminution in the abnormal density at the right lung base. Small pneumothorax at the right lung base. 2. Abnormal densities in the right and left lower lobes. 3. Cardiomegaly. 4. Aortic stent graft in place. .               **This report has been created using voice recognition software. It may contain minor errors which are inherent in voice recognition technology. **      Final report electronically signed by  Jacey Gardiner on 9/28/2022 4:55 PM      XR CHEST PORTABLE   Final Result   1. Large right pleural effusion with associated compressive atelectasis or right lower lobe collapse. 2. Mild increase groundglass opacities in the left lung are nonspecific but can suggest underlying pulmonary edema or pneumonia. 3. Other findings as described above. **This report has been created using voice recognition software. It may contain minor errors which are inherent in voice recognition technology. **      Final report electronically signed by Dr Neil Whitaker on 9/28/2022 10:32 AM      XR CHEST PORTABLE    (Results Pending)   XR CHEST PORTABLE    (Results Pending)   XR CHEST PORTABLE    (Results Pending)   XR CHEST PORTABLE    (Results Pending)          Consults:     IP CONSULT TO NEPHROLOGY  IP CONSULT TO CRITICAL CARE  IP CONSULT TO SPIRITUAL SERVICES  IP CONSULT TO CARDIOTHORACIC SURGERY  IP CONSULT TO PHARMACY  IP CONSULT TO SOCIAL WORK  IP CONSULT TO HOME CARE NEEDS  IP CONSULT TO CARDIOTHORACIC SURGERY  IP CONSULT TO PHARMACY    Disposition: Home  Condition at Discharge: Stable    Code Status:  Full Code     Patient Instructions:    Discharge lab work: None  Activity: activity as tolerated  Diet: ADULT DIET; Regular; 5 carb choices (75 gm/meal); Low Sodium (2 gm)      Follow-up visits:   CM STR [facility]  [address] [address]  [phone]    As needed for nursing, therapy    [facility]  [address]  [phone]  Follow up  As needed for walker, oxygen if needed    Braxton Gaines, APRN - CNP  [address] [facility]    Follow up on 12/14/2022  at 1:30 pm, have chest xray done 3 days prior to office appointment  hospital follow up after discharge    Valdo Salcido, [address]  [address] [address] [phone]    Follow up      [address] Lily Santos, DO  [address] [address]  [phone]    Go to  will see you next HD day Tuesday, Thorsday, and Saturday. Discharge Medications:        Medication List        START taking these medications      albuterol sulfate  (90 Base) MCG/ACT inhaler  Commonly known as: PROVENTIL;VENTOLIN;PROAIR  Inhale 2 puffs into the lungs every 6 hours as needed for Wheezing or Shortness of Breath     * atorvastatin 40 MG tablet  Commonly known as: LIPITOR  Take 1 tablet by mouth nightly     * atorvastatin 80 MG tablet  Commonly known as: LIPITOR     calcitRIOL 0.25 MCG capsule  Commonly known as: ROCALTROL  Take 9 capsules by mouth three times a week  Start taking on: October 17, 2022     cinacalcet 30 MG tablet  Commonly known as: SENSIPAR  Take 5 tablets by mouth three times a week  Start taking on: October 17, 2022     ciprofloxacin 500 MG tablet  Commonly known as: CIPRO  Take 1 tablet by mouth daily for 5 days     oxyCODONE-acetaminophen 5-325 MG per tablet  Commonly known as: PERCOCET  Take 1 tablet by mouth every 6 hours as needed for Pain for up to 3 days. * This list has 2 medication(s) that are the same as other medications prescribed for you. Read the directions carefully, and ask your doctor or other care provider to review them with you.                 CHANGE how you take these medications      doxazosin 4 MG tablet  Commonly known as: CARDURA  Take 1 tablet by mouth every 12 hours  What changed:   medication strength  how much to take     isosorbide mononitrate 60 MG extended release tablet  Commonly known as: IMDUR  Take 1 tablet by mouth in the morning and at bedtime  What changed:   medication strength  how much to take  when to take this            CONTINUE taking these medications      acetaminophen 325 MG tablet  Commonly known as: TYLENOL     aspirin 81 MG EC tablet  Take 1 tablet by mouth daily     cloNIDine 0.3 MG tablet  Commonly known as: CATAPRES  Take 1 tablet by mouth 3 times daily     DIALYVITE 800-ZINC 15 PO     famotidine 20 MG tablet  Commonly known as: PEPCID  Take 1 tablet by mouth daily     fluticasone 50 MCG/ACT nasal spray  Commonly known as: FLONASE     folic acid 685 MCG tablet  Commonly known as: FOLVITE     hydrOXYzine HCl 50 MG tablet  Commonly known as: ATARAX     nitroGLYCERIN 0.4 MG SL tablet  Commonly known as: NITROSTAT  up to max of 3 total doses. If no relief after 1 dose, call 911.     sertraline 100 MG tablet  Commonly known as: ZOLOFT     sevelamer 800 MG tablet  Commonly known as: RENVELA     therapeutic multivitamin-minerals tablet     traZODone 50 MG tablet  Commonly known as: DESYREL  Take 1 tablet by mouth nightly as needed for Sleep     Velphoro 500 MG Chew  Generic drug: Sucroferric Oxyhydroxide     verapamil 240 MG extended release tablet  Commonly known as: CALAN SR     vitamin D 25 MCG (1000 UT) Tabs tablet  Commonly known as: CHOLECALCIFEROL     vitamin E 400 UNIT capsule            STOP taking these medications      minoxidil 2.5 MG tablet  Commonly known as: LONITEN            ASK your doctor about these medications      ferrous sulfate 325 (65 Fe) MG EC tablet  Commonly known as: FE TABS 325  Take 1 tablet by mouth 3 times daily (with meals)               Where to Get Your Medications        These medications were sent to [id number] - [place], [place] - [address] [address] [address] - P [phone] - F [phone]  [address] [facility] [place] [address]      Phone: [phone]   albuterol sulfate  (90 Base) MCG/ACT inhaler  calcitRIOL 0.25 MCG capsule  cinacalcet 30 MG tablet  ciprofloxacin 500 MG tablet  doxazosin 4 MG tablet  isosorbide mononitrate 60 MG extended release tablet  oxyCODONE-acetaminophen 5-325 MG per tablet              Time Spent on discharge is 35 minutes in the examination, evaluation, counseling and review of medications and discharge plan.     Thank you WADE Bruno CNP for the opportunity to be involved in this patient's care.      Signed:    Electronically signed by Keisha Whitney MD on 10/15/22 at 4:02 PM EDT     Case was discussed with Attending, Dr. Zeke Ariza

## 2022-10-15 NOTE — PROGRESS NOTES
A home oxygen evaluation has been completed. [x]Patient is an inpatient. It is expected that the patient will be discharged within the next 48 hours. Qualified provider to write order for home prescription if patient qualifies. Social service/care managers will arrange for home oxygen. If patient is active, arrange for Home Medical supplier to assess for Oxygen Conserving Device per pulse oximetry. []Patient is an outpatient. Results will be faxed to the ordering provider. Qualified provider to write order for home prescription if patient qualifies and arranges for home oxygen. Patient was placed on room air for 3 minutes. SpO2 was 88 % on room air at rest. Patients SpO2 was below 89% and qualified for home oxygen. Oxygen was applied at 2 lpm via nasal cannula to maintain a SpO2 between 90-92% while at rest. Actual SpO2 was 94 %. Patient can ambulate for exercise flow rate. Patients was ambulated, SpO2 was 93% on 6 lpm to maintain SpO2 between 90-92% while exercising. If oxygen need is greater than 4 lpm the SpO2 on 4 lpm was 84%. Note: For any SpO2 at 82% see policy and procedure for possible qualifications.

## 2022-10-15 NOTE — CARE COORDINATION
RN states patient will be discharged today and may need home oxygen, eval has not been completed as he is in HD. Patient has verbalized to August Celeste CM that he wishes to use  DME, instructed RN to contact [place] [facility] on-call associate if he qualifies.  Electronically signed by Jason Lee RN on 10/15/2022 at 2:52 PM

## 2022-10-15 NOTE — PLAN OF CARE
Problem: Discharge Planning  Goal: Discharge to home or other facility with appropriate resources  10/15/2022 0240 by Andres Nelson RN  Outcome: Progressing  Flowsheets (Taken 10/14/2022 2000)  Discharge to home or other facility with appropriate resources: Identify barriers to discharge with patient and caregiver  10/14/2022 1405 by Jerry Huerta RN  Outcome: Progressing  Flowsheets (Taken 10/14/2022 1405)  Discharge to home or other facility with appropriate resources:   Identify barriers to discharge with patient and caregiver   Identify discharge learning needs (meds, wound care, etc)   Refer to discharge planning if patient needs post-hospital services based on physician order or complex needs related to functional status, cognitive ability or social support system   Arrange for needed discharge resources and transportation as appropriate     Problem: Pain  Goal: Verbalizes/displays adequate comfort level or baseline comfort level  10/15/2022 0240 by Andres Nelson RN  Outcome: Progressing  10/14/2022 1405 by Jerry Huerta RN  Outcome: Progressing  Flowsheets (Taken 10/14/2022 1405)  Verbalizes/displays adequate comfort level or baseline comfort level:   Encourage patient to monitor pain and request assistance   Administer analgesics based on type and severity of pain and evaluate response   Assess pain using appropriate pain scale   Implement non-pharmacological measures as appropriate and evaluate response  Note: Pain goal zero     Problem: Safety - Adult  Goal: Free from fall injury  10/15/2022 0240 by Andres Nelson RN  Outcome: Progressing  10/14/2022 1405 by Jerry Huerta RN  Outcome: Progressing  Flowsheets (Taken 10/14/2022 1405)  Free From Fall Injury:   Instruct family/caregiver on patient safety   Based on caregiver fall risk screen, instruct family/caregiver to ask for assistance with transferring infant if caregiver noted to have fall risk factors  Note: Hourly rounding, 1 assist with ambulation, call light within reach, bed and chair alarm in use     Problem: ABCDS Injury Assessment  Goal: Absence of physical injury  10/15/2022 0240 by Rebecca Hilton RN  Outcome: Progressing  10/14/2022 1405 by Milagro Wheatley RN  Outcome: Progressing  Flowsheets (Taken 10/14/2022 1405)  Absence of Physical Injury: Implement safety measures based on patient assessment  Note: Hourly rounding, 1 assist with ambulation, call light within reach, bed and chair alarm in use.       Problem: Respiratory - Adult  Goal: Achieves optimal ventilation and oxygenation  10/15/2022 0240 by Rebecca Hilton RN  Outcome: Progressing  Flowsheets (Taken 10/14/2022 2000)  Achieves optimal ventilation and oxygenation:   Assess for changes in respiratory status   Assess for changes in mentation and behavior  10/14/2022 1405 by Milagro Wheatley RN  Outcome: Progressing  Flowsheets (Taken 10/14/2022 1405)  Achieves optimal ventilation and oxygenation:   Assess for changes in respiratory status   Position to facilitate oxygenation and minimize respiratory effort   Assess and instruct to report shortness of breath or any respiratory difficulty   Assess for changes in mentation and behavior   Oxygen supplementation based on oxygen saturation or arterial blood gases   Encourage broncho-pulmonary hygiene including cough, deep breathe, incentive spirometry     Problem: Cardiovascular - Adult  Goal: Maintains optimal cardiac output and hemodynamic stability  10/15/2022 0240 by Rebecca Hilton RN  Outcome: Progressing  10/14/2022 1405 by Milagro Wheatley RN  Outcome: Progressing  Flowsheets (Taken 10/14/2022 1405)  Maintains optimal cardiac output and hemodynamic stability:   Monitor blood pressure and heart rate   Assess for signs of decreased cardiac output  Goal: Absence of cardiac dysrhythmias or at baseline  10/15/2022 0240 by Rebecca Hilton RN  Outcome: Progressing  10/14/2022 1405 by Milagro Wheatley RN  Outcome: Progressing  Flowsheets (Taken 10/14/2022 1405)  Absence of cardiac dysrhythmias or at baseline:   Monitor cardiac rate and rhythm   Administer antiarrhythmia medication and electrolyte replacement as ordered   Assess for signs of decreased cardiac output     Problem: Skin/Tissue Integrity - Adult  Goal: Skin integrity remains intact  10/15/2022 0240 by Jaime Sanon RN  Outcome: Progressing  Flowsheets (Taken 10/14/2022 2000)  Skin Integrity Remains Intact: Monitor for areas of redness and/or skin breakdown  10/14/2022 1405 by Austin Dillard RN  Outcome: Progressing  Flowsheets  Taken 10/14/2022 1405 by Austin Dillard RN  Skin Integrity Remains Intact: Monitor for areas of redness and/or skin breakdown  Taken 10/14/2022 1120 by Austin Dillard RN  Skin Integrity Remains Intact: Monitor for areas of redness and/or skin breakdown  Taken 10/14/2022 0027 by Jaime Sanon RN  Skin Integrity Remains Intact: Monitor for areas of redness and/or skin breakdown  Note: Patient educated on q2hr turns, monitor for new skin breakdown.    Goal: Incisions, wounds, or drain sites healing without S/S of infection  10/15/2022 0240 by Jaime Sanon RN  Outcome: Progressing  Flowsheets (Taken 10/14/2022 2000)  Incisions, Wounds, or Drain Sites Healing Without Sign and Symptoms of Infection: Implement wound care per orders  10/14/2022 1405 by Austin Dillard RN  Outcome: Progressing  Flowsheets  Taken 10/14/2022 1405 by Austin Dillard RN  Incisions, Wounds, or Drain Sites Healing Without Sign and Symptoms of Infection:   Implement wound care per orders   Initiate pressure ulcer prevention bundle as indicated  Taken 10/14/2022 1120 by Austin Dillard RN  Incisions, Wounds, or Drain Sites Healing Without Sign and Symptoms of Infection:   ADMISSION and DAILY: Assess and document risk factors for pressure ulcer development   Implement wound care per orders   Initiate pressure ulcer prevention bundle as indicated   TWICE DAILY: Assess and document skin integrity  Taken 10/14/2022 0027 by Shoaib Morris RN  Incisions, Wounds, or Drain Sites Healing Without Sign and Symptoms of Infection: ADMISSION and DAILY: Assess and document risk factors for pressure ulcer development  Note: Monitor and document q4 hr with assessments     Problem: Infection - Adult  Goal: Absence of infection at discharge  10/15/2022 0240 by Shoaib Morris RN  Outcome: Progressing  Flowsheets (Taken 10/14/2022 2000)  Absence of infection at discharge: Assess and monitor for signs and symptoms of infection  10/14/2022 1405 by Flor Yeh RN  Outcome: Progressing  Flowsheets (Taken 10/14/2022 1405)  Absence of infection at discharge:   Assess and monitor for signs and symptoms of infection   Monitor lab/diagnostic results   Monitor all insertion sites i.e., indwelling lines, tubes and drains   Administer medications as ordered   Instruct and encourage patient and family to use good hand hygiene technique  Goal: Absence of infection during hospitalization  10/15/2022 0240 by Shoaib Morris RN  Outcome: Progressing  Flowsheets (Taken 10/14/2022 2000)  Absence of infection during hospitalization: Assess and monitor for signs and symptoms of infection  10/14/2022 1405 by Flor Yeh RN  Outcome: Progressing  Flowsheets (Taken 10/14/2022 1405)  Absence of infection during hospitalization:   Assess and monitor for signs and symptoms of infection   Monitor lab/diagnostic results   Monitor all insertion sites i.e., indwelling lines, tubes and drains   Administer medications as ordered   Instruct and encourage patient and family to use good hand hygiene technique     Problem: Metabolic/Fluid and Electrolytes - Adult  Goal: Electrolytes maintained within normal limits  10/15/2022 0240 by Shoaib Morris RN  Outcome: Progressing  Flowsheets (Taken 10/14/2022 2000)  Electrolytes maintained within normal limits: Monitor labs and assess patient for signs and symptoms of electrolyte imbalances  10/14/2022 1405 by Flor Yeh RN  Outcome: Progressing  Flowsheets (Taken 10/14/2022 1405)  Electrolytes maintained within normal limits:   Monitor labs and assess patient for signs and symptoms of electrolyte imbalances   Administer electrolyte replacement as ordered   Fluid restriction as ordered   Monitor response to electrolyte replacements, including repeat lab results as appropriate   Instruct patient on fluid and nutrition restrictions as appropriate  Goal: Hemodynamic stability and optimal renal function maintained  10/15/2022 0240 by Skyler Obregon RN  Outcome: Progressing  Flowsheets (Taken 10/14/2022 2000)  Hemodynamic stability and optimal renal function maintained: Monitor labs and assess for signs and symptoms of volume excess or deficit  10/14/2022 1405 by Julius Keating RN  Outcome: Progressing  Flowsheets (Taken 10/14/2022 1405)  Hemodynamic stability and optimal renal function maintained:   Monitor labs and assess for signs and symptoms of volume excess or deficit   Monitor intake, output and patient weight   Monitor response to interventions for patient's volume status, including labs, urine output, blood pressure (other measures as available)   Encourage oral intake as appropriate   Instruct patient on fluid and nutrition restrictions as appropriate     Problem: Hematologic - Adult  Goal: Maintains hematologic stability  10/15/2022 0240 by Skyler Obregon RN  Outcome: Progressing  Flowsheets (Taken 10/14/2022 2000)  Maintains hematologic stability: Assess for signs and symptoms of bleeding or hemorrhage  10/14/2022 1405 by Julius Keating RN  Outcome: Progressing  Flowsheets (Taken 10/14/2022 1405)  Maintains hematologic stability:   Assess for signs and symptoms of bleeding or hemorrhage   Monitor labs for bleeding or clotting disorders     Problem: Chronic Conditions and Co-morbidities  Goal: Patient's chronic conditions and co-morbidity symptoms are monitored and maintained or improved  10/15/2022 0240 by Skyler Obregon RN  Outcome: Progressing  Flowsheets (Taken 10/14/2022 2000)  Care Plan - Patient's Chronic Conditions and Co-Morbidity Symptoms are Monitored and Maintained or Improved: Monitor and assess patient's chronic conditions and comorbid symptoms for stability, deterioration, or improvement  10/14/2022 1405 by Luz Mac RN  Outcome: Progressing  Flowsheets (Taken 10/14/2022 1405)  Care Plan - Patient's Chronic Conditions and Co-Morbidity Symptoms are Monitored and Maintained or Improved:   Monitor and assess patient's chronic conditions and comorbid symptoms for stability, deterioration, or improvement   Collaborate with multidisciplinary team to address chronic and comorbid conditions and prevent exacerbation or deterioration   Update acute care plan with appropriate goals if chronic or comorbid symptoms are exacerbated and prevent overall improvement and discharge     Problem: Neurosensory - Adult  Goal: Achieves maximal functionality and self care  10/15/2022 0240 by Flavia Bello RN  Outcome: Progressing  Flowsheets (Taken 10/14/2022 2000)  Achieves maximal functionality and self care: Monitor swallowing and airway patency with patient fatigue and changes in neurological status  10/14/2022 1405 by Luz Mac RN  Outcome: Progressing  Flowsheets (Taken 10/14/2022 1405)  Achieves maximal functionality and self care:   Monitor swallowing and airway patency with patient fatigue and changes in neurological status   Encourage and assist patient to increase activity and self care with guidance from physical therapy/occupational therapy  Goal: Achieves stable or improved neurological status  10/15/2022 0240 by Flavia Bello RN  Outcome: Progressing  Flowsheets (Taken 10/14/2022 2000)  Achieves stable or improved neurological status:   Assess for and report changes in neurological status   Initiate measures to prevent increased intracranial pressure  10/14/2022 1405 by Luz Mac RN  Outcome: Progressing  Flowsheets (Taken 10/14/2022 1405)  Achieves stable or improved neurological status:   Assess for and report changes in neurological status   Maintain blood pressure and fluid volume within ordered parameters to optimize cerebral perfusion and minimize risk of hemorrhage   Monitor temperature, glucose, and sodium.  Initiate appropriate interventions as ordered     Problem: Musculoskeletal - Adult  Goal: Return mobility to safest level of function  10/15/2022 0240 by Hilary Rowe RN  Outcome: Progressing  10/14/2022 1405 by Gabriel Donis RN  Outcome: Progressing  Flowsheets (Taken 10/14/2022 1405)  Return Mobility to Safest Level of Function:   Assess patient stability and activity tolerance for standing, transferring and ambulating with or without assistive devices   Assist with transfers and ambulation using safe patient handling equipment as needed   Ensure adequate protection for wounds/incisions during mobilization   Obtain physical therapy/occupational therapy consults as needed   Instruct patient/family in ordered activity level  Goal: Maintain proper alignment of affected body part  10/15/2022 0240 by Hilary Rowe RN  Outcome: Progressing  10/14/2022 1405 by Gabriel Donis RN  Outcome: Progressing  Flowsheets (Taken 10/14/2022 1405)  Maintain proper alignment of affected body part:   Support and protect limb and body alignment per provider's orders   Instruct and reinforce with patient and family use of appropriate assistive device and precautions (e.g. spinal or hip dislocation precautions)  Goal: Return ADL status to a safe level of function  10/15/2022 0240 by Hilary Rowe RN  Outcome: Progressing  10/14/2022 1405 by Gabriel Donis RN  Outcome: Progressing  Flowsheets (Taken 10/14/2022 1405)  Return ADL Status to a Safe Level of Function:   Administer medication as ordered   Assess activities of daily living deficits and provide assistive devices as needed   Assist and instruct patient to increase activity and self care as tolerated   Obtain physical therapy/occupational therapy consults as needed     Problem: Gastrointestinal - Adult  Goal: Maintains or returns to baseline bowel function  10/15/2022 0240 by Amanda Pineda RN  Outcome: Progressing  10/14/2022 1405 by Rhonda Mejia RN  Outcome: Progressing  Flowsheets (Taken 10/14/2022 1405)  Maintains or returns to baseline bowel function:   Assess bowel function   Encourage oral fluids to ensure adequate hydration   Administer ordered medications as needed   Encourage mobilization and activity   Nutrition consult to assist patient with appropriate food choices  Goal: Maintains adequate nutritional intake  10/15/2022 0240 by Amanda Pineda RN  Outcome: Progressing  10/14/2022 1405 by Rhonda Mejia RN  Outcome: Progressing  Flowsheets (Taken 10/14/2022 1405)  Maintains adequate nutritional intake:   Monitor percentage of each meal consumed   Identify factors contributing to decreased intake, treat as appropriate   Monitor intake and output, weight and lab values     Problem: Genitourinary - Adult  Goal: Absence of urinary retention  10/15/2022 0240 by Amanda Pineda RN  Outcome: Progressing  10/14/2022 1405 by Rhonda Mejia RN  Outcome: Progressing  Flowsheets (Taken 10/14/2022 1405)  Absence of urinary retention: Assess patients ability to void and empty bladder

## 2022-10-15 NOTE — PROGRESS NOTES
Patient completed home O2 eval and qualified. Writer contacted Sergo with [facility] for homegoing supplies. Will call back with estimated time of arrival.  Martina Bertrand will continue with discharge paperwork as spouse is here for transport.

## 2022-10-15 NOTE — PLAN OF CARE
Problem: Discharge Planning  Goal: Discharge to home or other facility with appropriate resources  10/15/2022 0934 by Elke Biggs RN  Outcome: Progressing  10/15/2022 0240 by Jaime Sanon RN  Outcome: Progressing  Flowsheets (Taken 10/14/2022 2000)  Discharge to home or other facility with appropriate resources: Identify barriers to discharge with patient and caregiver     Problem: Pain  Goal: Verbalizes/displays adequate comfort level or baseline comfort level  10/15/2022 0934 by Elke Biggs RN  Outcome: Progressing  10/15/2022 0240 by Jaime Sanon RN  Outcome: Progressing     Problem: Safety - Adult  Goal: Free from fall injury  10/15/2022 0934 by Elke Biggs RN  Outcome: Progressing  10/15/2022 0240 by Jaime Sanon RN  Outcome: Progressing     Problem: ABCDS Injury Assessment  Goal: Absence of physical injury  10/15/2022 0934 by Elke Biggs RN  Outcome: Progressing  10/15/2022 0240 by Jaime Sanon RN  Outcome: Progressing     Problem: Respiratory - Adult  Goal: Achieves optimal ventilation and oxygenation  10/15/2022 0934 by Elke Biggs RN  Outcome: Progressing  10/15/2022 0240 by Jaime Sanon RN  Outcome: Progressing  Flowsheets (Taken 10/14/2022 2000)  Achieves optimal ventilation and oxygenation:   Assess for changes in respiratory status   Assess for changes in mentation and behavior     Problem: Cardiovascular - Adult  Goal: Maintains optimal cardiac output and hemodynamic stability  10/15/2022 0934 by Elke Biggs RN  Outcome: Progressing  10/15/2022 0240 by Jaime Sanon RN  Outcome: Progressing  Goal: Absence of cardiac dysrhythmias or at baseline  10/15/2022 0934 by Elke Biggs RN  Outcome: Progressing  10/15/2022 0240 by Jaime Sanon RN  Outcome: Progressing     Problem: Skin/Tissue Integrity - Adult  Goal: Skin integrity remains intact  10/15/2022 0934 by Elke Biggs RN  Outcome: Progressing  Flowsheets (Taken 10/15/2022 0241 by Jaime Sanon RN)  Skin Integrity Remains Intact: Monitor for areas of redness and/or skin breakdown  10/15/2022 0240 by Juan Pablo Haque RN  Outcome: Progressing  Flowsheets (Taken 10/14/2022 2000)  Skin Integrity Remains Intact: Monitor for areas of redness and/or skin breakdown  Goal: Incisions, wounds, or drain sites healing without S/S of infection  10/15/2022 0934 by Ortega Whaley RN  Outcome: Progressing  Flowsheets (Taken 10/15/2022 0241 by Juan Pablo Haque RN)  Incisions, Wounds, or Drain Sites Healing Without Sign and Symptoms of Infection: ADMISSION and DAILY: Assess and document risk factors for pressure ulcer development  10/15/2022 0240 by Juan Pablo Haque RN  Outcome: Progressing  Flowsheets (Taken 10/14/2022 2000)  Incisions, Wounds, or Drain Sites Healing Without Sign and Symptoms of Infection: Implement wound care per orders     Problem: Infection - Adult  Goal: Absence of infection at discharge  10/15/2022 0934 by Ortega Whaley RN  Outcome: Progressing  10/15/2022 0240 by Juan Pablo Haque RN  Outcome: Progressing  Flowsheets (Taken 10/14/2022 2000)  Absence of infection at discharge: Assess and monitor for signs and symptoms of infection  Goal: Absence of infection during hospitalization  10/15/2022 0934 by Ortega Whaley RN  Outcome: Progressing  10/15/2022 0240 by Juan Pablo Haque RN  Outcome: Progressing  Flowsheets (Taken 10/14/2022 2000)  Absence of infection during hospitalization: Assess and monitor for signs and symptoms of infection     Problem: Metabolic/Fluid and Electrolytes - Adult  Goal: Electrolytes maintained within normal limits  10/15/2022 0934 by Ortega Whaley RN  Outcome: Progressing  10/15/2022 0240 by Juan Pablo Haque RN  Outcome: Progressing  Flowsheets (Taken 10/14/2022 2000)  Electrolytes maintained within normal limits: Monitor labs and assess patient for signs and symptoms of electrolyte imbalances  Goal: Hemodynamic stability and optimal renal function maintained  10/15/2022 0934 by Ortega Whaley RN  Outcome: Progressing  10/15/2022 0240 by Annalisa Murray RN  Outcome: Progressing  Flowsheets (Taken 10/14/2022 2000)  Hemodynamic stability and optimal renal function maintained: Monitor labs and assess for signs and symptoms of volume excess or deficit     Problem: Hematologic - Adult  Goal: Maintains hematologic stability  10/15/2022 0934 by lEiu Zamora RN  Outcome: Progressing  10/15/2022 0240 by Annalisa Murray RN  Outcome: Progressing  Flowsheets (Taken 10/14/2022 2000)  Maintains hematologic stability: Assess for signs and symptoms of bleeding or hemorrhage     Problem: Chronic Conditions and Co-morbidities  Goal: Patient's chronic conditions and co-morbidity symptoms are monitored and maintained or improved  10/15/2022 0934 by Eliu Zamora RN  Outcome: Progressing  10/15/2022 0240 by Annalisa Murray RN  Outcome: Progressing  Flowsheets (Taken 10/14/2022 2000)  Care Plan - Patient's Chronic Conditions and Co-Morbidity Symptoms are Monitored and Maintained or Improved: Monitor and assess patient's chronic conditions and comorbid symptoms for stability, deterioration, or improvement     Problem: Neurosensory - Adult  Goal: Achieves maximal functionality and self care  10/15/2022 0934 by Eliu Zamora RN  Outcome: Progressing  10/15/2022 0240 by Annailsa Murray RN  Outcome: Progressing  Flowsheets (Taken 10/14/2022 2000)  Achieves maximal functionality and self care: Monitor swallowing and airway patency with patient fatigue and changes in neurological status  Goal: Achieves stable or improved neurological status  10/15/2022 0934 by Eliu Zamora RN  Outcome: Progressing  10/15/2022 0240 by Annalisa Murray RN  Outcome: Progressing  Flowsheets (Taken 10/14/2022 2000)  Achieves stable or improved neurological status:   Assess for and report changes in neurological status   Initiate measures to prevent increased intracranial pressure     Problem: Musculoskeletal - Adult  Goal: Return mobility to safest level of function  10/15/2022 0934 by Eliu Zamora RN  Outcome: Progressing  10/15/2022 0240 by Annalisa Murray RN  Outcome: Progressing  Goal: Maintain proper alignment of affected body part  10/15/2022 0934 by Eliu Zamora RN  Outcome: Progressing  10/15/2022 0240 by Annalisa Murray RN  Outcome: Progressing  Goal: Return ADL status to a safe level of function  10/15/2022 0934 by Eliu Zamora RN  Outcome: Progressing  10/15/2022 0240 by Annalisa Murray RN  Outcome: Progressing     Problem: Gastrointestinal - Adult  Goal: Maintains or returns to baseline bowel function  10/15/2022 0934 by Eliu Zamora RN  Outcome: Progressing  10/15/2022 0240 by Annalisa Murray RN  Outcome: Progressing  Goal: Maintains adequate nutritional intake  10/15/2022 0934 by Eliu Zamora RN  Outcome: Progressing  10/15/2022 0240 by Annalisa Murray RN  Outcome: Progressing     Problem: Genitourinary - Adult  Goal: Absence of urinary retention  10/15/2022 0934 by Eliu Zamora RN  Outcome: Progressing  10/15/2022 0240 by Annalisa Murray RN  Outcome: Progressing

## 2022-10-15 NOTE — PROGRESS NOTES
[facility]   Nephrology progress note  10/15/2022, 1:53 PM      Pt Name:    Addy Yoo  MRN:     [id number]     YOB: 1965  Admit Date:    9/28/2022  9:56 AM    Chief Complaint: Nephrology following for ESRD. Subjective:  Patient was seen and examined during hemodialysis. He is being discharged possibly today so we are getting him back on his regular HD schedule. Feels ok. Breathing stable. Bps 47E systolic, UF 1 liter. Objective:  24HR INTAKE/OUTPUT:    Intake/Output Summary (Last 24 hours) at 10/15/2022 1353  Last data filed at 10/15/2022 0337  Gross per 24 hour   Intake 150 ml   Output --   Net 150 ml         I/O last 3 completed shifts: In: 1019.7 [P.O.:570; IV Piggyback:49.7]  Out: 1800   No intake/output data recorded.    Admission weight: 250 lb (113.4 kg)  Wt Readings from Last 3 Encounters:   10/15/22 216 lb 4.3 oz (98.1 kg)   09/13/21 250 lb (113.4 kg)   07/26/21 240 lb (108.9 kg)        Vitals :   Vitals:    10/15/22 0337 10/15/22 0808 10/15/22 0951 10/15/22 1030   BP: 123/60 (!) 133/56  130/66   Pulse: 78 86  81   Resp: 18 16 16 14   Temp: 97.8 °F (36.6 °C) 98.3 °F (36.8 °C)  98 °F (36.7 °C)   TempSrc: Oral Oral     SpO2: 99% 92%  92%   Weight: 224 lb (101.6 kg)   216 lb 4.3 oz (98.1 kg)   Height:           Physical examination  General Appearance: alert and cooperative with exam, appears comfortable, no distress  Mouth/Throat: Oral mucosa moist  Neck: No JVD  Lungs: diminished, has chest binder  Heart:  S1, S2 heard  GI: soft, non-tender  Extremities: trace LE edema, left arm AV fistula    Medications:  Infusion:    sodium chloride      sodium chloride       Meds:    calcitRIOL  2.25 mcg Oral Once per day on Mon Wed Fri    cinacalcet  150 mg Oral Once per day on Mon Wed Fri    epoetin qiana-epbx  2,000 Units SubCUTAneous Once per day on Mon Wed Fri    And    epoetin qiana-epbx  2,000 Units SubCUTAneous Once per day on Mon Wed Fri    doxazosin  4 mg Oral 2 times per day    isosorbide mononitrate  60 mg Oral BID    cefepime  1,000 mg IntraVENous Q24H    sodium chloride flush  5-40 mL IntraVENous 2 times per day    [Held by provider] minoxidil  2.5 mg Oral BID    ketamine  100 mg IntraVENous Once    dexamethasone  0.5 mg Oral Daily    docusate sodium  100 mg Oral BID    senna  1 tablet Oral Nightly    aspirin  81 mg Oral Daily    atorvastatin  40 mg Oral Nightly    cloNIDine  0.3 mg Oral TID    famotidine  20 mg Oral Daily    ferrous sulfate  325 mg Oral Daily with breakfast    folic acid  9,849 mcg Oral Daily    fluticasone  1 spray Nasal Daily    sertraline  100 mg Oral Daily    verapamil  240 mg Oral Daily    multivitamin  1 tablet Oral Daily    sodium chloride flush  10 mL IntraVENous 2 times per day     Meds prn: sodium chloride flush, sodium chloride, nicotine polacrilex, oxyCODONE-acetaminophen **OR** oxyCODONE-acetaminophen, morphine **OR** morphine, promethazine **OR** ondansetron, albuterol sulfate HFA, hydrOXYzine HCl, traZODone, sodium chloride flush, sodium chloride, ondansetron **OR** [DISCONTINUED] ondansetron, polyethylene glycol, acetaminophen     Lab Data :  CBC:   Recent Labs     10/13/22  0628 10/14/22  0335 10/15/22  0346   WBC 8.0 6.0 5.5   HGB 9.1* 8.2* 8.0*   HCT 29.5* 27.6* 26.3*    127* 146     CMP:  Recent Labs     10/13/22  0628 10/14/22  0335 10/15/22  0346    136 138   K 4.5 4.1 4.4    99 103   CO2 27 25 25   BUN 40* 56* 31*   CREATININE 7.0* 9.2* 6.6*   GLUCOSE 95 85 87   CALCIUM 9.0 9.0 8.4*     Hepatic:   No results for input(s): LABALBU, AST, ALT, ALB, BILITOT, ALKPHOS in the last 72 hours. Assessment and Plan:    1. ESRD on HD   -he runs TTS as outpatient. Hemodialysis today prior to discharge    2. Hyperkalemia : improved  3. HTN:better, minoxidil held, hold at discharge  4. Loculated pleural effusion: s/p thoracotomy   5. Anemia in CKD : continue KENYETTA  6. Secondary hyperparathyroidism: sensipar, calcitriol  7. Chronic fluid overload    D/W patient     Robbie Castro DO  [facility]    This report has been created using voice recognition software.  It may contain minor errors which are inherent in voice recognition technology

## 2022-10-16 ENCOUNTER — HOSPITAL ENCOUNTER (EMERGENCY)
Age: 57
Discharge: HOME | End: 2022-10-16
Attending: STUDENT IN AN ORGANIZED HEALTH CARE EDUCATION/TRAINING PROGRAM
Payer: MEDICARE

## 2022-10-16 ENCOUNTER — APPOINTMENT (OUTPATIENT)
Dept: GENERAL RADIOLOGY | Age: 57
End: 2022-10-16
Payer: MEDICARE

## 2022-10-16 VITALS
DIASTOLIC BLOOD PRESSURE: 81 MMHG | HEIGHT: 75 IN | HEART RATE: 90 BPM | WEIGHT: 220 LBS | OXYGEN SATURATION: 100 % | SYSTOLIC BLOOD PRESSURE: 167 MMHG | BODY MASS INDEX: 27.35 KG/M2 | RESPIRATION RATE: 16 BRPM | TEMPERATURE: 98.4 F

## 2022-10-16 DIAGNOSIS — K59.00 CONSTIPATION, UNSPECIFIED CONSTIPATION TYPE: Primary | ICD-10-CM

## 2022-10-16 PROCEDURE — 6370000000 HC RX 637 (ALT 250 FOR IP): Performed by: STUDENT IN AN ORGANIZED HEALTH CARE EDUCATION/TRAINING PROGRAM

## 2022-10-16 PROCEDURE — 74018 RADEX ABDOMEN 1 VIEW: CPT

## 2022-10-16 PROCEDURE — 99284 EMERGENCY DEPT VISIT MOD MDM: CPT

## 2022-10-16 RX ORDER — SODIUM PHOSPHATE,MONO-DIBASIC 19G-7G/118
1 ENEMA (ML) RECTAL ONCE
Status: COMPLETED | OUTPATIENT
Start: 2022-10-16 | End: 2022-10-16

## 2022-10-16 RX ADMIN — SODIUM PHOSPHATE 1 ENEMA: 7; 19 ENEMA RECTAL at 21:32

## 2022-10-16 ASSESSMENT — PAIN SCALES - GENERAL
PAINLEVEL_OUTOF10: 8
PAINLEVEL_OUTOF10: 8

## 2022-10-16 ASSESSMENT — PAIN - FUNCTIONAL ASSESSMENT
PAIN_FUNCTIONAL_ASSESSMENT: 0-10
PAIN_FUNCTIONAL_ASSESSMENT: 0-10
PAIN_FUNCTIONAL_ASSESSMENT: NONE - DENIES PAIN

## 2022-10-16 NOTE — ED TRIAGE NOTES
Pt comes to ED through triage with c/o post-op complication. Pt states that he had a lung surgery completed at [facility] on Monday and has not had a bowel movement since. Pt states that he feels the urge to go but feels pressure in his surgical site when he bears down. Pt was discharged yesterday evening. Pt has taken one oxycodone today at 1830. Pt reports taking 200mg of colace at 1630 and another 100mg at 1830.

## 2022-10-17 NOTE — ED PROVIDER NOTES
[facility] [address] [address] EMERGENCY DEPT  EMERGENCY DEPARTMENT     Pt Name: Rosaura Nino  MRN: [id number]  Mahamed 1965  Date of evaluation: 10/16/2022  Provider: Raine Cordero MD,     [address]       Chief Complaint   Patient presents with    Constipation       HISTORY OF PRESENT ILLNESS    Rosaura Friend is a 56 y.o. male who presents to the emergency department from home with a chief complaint of constipation. Patient has been hospitalized for the past week and is status post VATS surgery. He was discharged earlier this morning. He denies abdominal pain, nausea, vomiting. He reports he has not had a bowel movement in the past 7 days. He reports he feels the urge however is unable to pass stool. He is pain-free. Triage notes and Nursing notes were reviewed by myself. Any discrepancies are addressed above. PAST MEDICAL HISTORY     Past Medical History:   Diagnosis Date    AAA (abdominal aortic aneurysm)     NURIS (acute kidney injury) ([facility] 9/24/2015    Anemia associated with chronic renal failure     Arthritis     stated in hands    Cocaine abuse ([address] 5/10/2014    Depression     Diabetes mellitus ([address]     pt states he no longer has diabetes he has lost alot of davion.      FSGS (focal segmental glomerulosclerosis) 5/23/2013    Hemodialysis patient ([address] 10/17/2016    on hemodialysis with [facility]    Hemorrhoids 1/16/2012    History of blood transfusion     Hyperlipidemia     Hyperphosphatemia 5/21/2016    Hypertension     Left renal artery stenosis ([address] 5/22/2014    Monoclonal (M) protein disease, multiple 'M' protein     Nicotine dependence 6/16/2014    Noncompliance     Pneumonia     Psychiatric problem     PTSD (post-traumatic stress disorder)     Pt vet from [place]    Secondary hyperparathyroidism (of renal origin)     Sleep apnea        SURGICAL HISTORY       Past Surgical History:   Procedure Laterality Date    ABDOMINAL AORTIC ANEURYSM REPAIR BRONCHOSCOPY N/A 10/6/2022    BRONCHOSCOPY performed by Aravind Cobos MD at [address] Left 07/08/2016    EKG 12-LEAD  9/24/2015         HAND TENDON SURGERY Left 4/5/2019    LEFT THUMB FLEXOR TENDON REPAIR performed by Kadeem Martinez MD at [address] [place] [address] Right 10/10/2022    Right Thoracotomy & Decortication with Partial Lung Resection performed by Raul Price MD at [place] [address] Left 07/08/2016    AV Fistula    VASCULAR SURGERY Right 1990    Surgery on Achilles tendon       CURRENT MEDICATIONS       Previous Medications    ACETAMINOPHEN (TYLENOL) 325 MG TABLET    Take 650 mg by mouth every 4 hours as needed for Pain or Fever    ALBUTEROL SULFATE HFA (PROVENTIL;VENTOLIN;PROAIR) 108 (90 BASE) MCG/ACT INHALER    Inhale 2 puffs into the lungs every 6 hours as needed for Wheezing or Shortness of Breath    ASPIRIN 81 MG EC TABLET    Take 1 tablet by mouth daily    ATORVASTATIN (LIPITOR) 40 MG TABLET    Take 1 tablet by mouth nightly    ATORVASTATIN (LIPITOR) 80 MG TABLET    TAKE 1/2 TABLET BY MOUTH ONCE DAILY IN THE EVENING    B COMPLEX-C-ZN-FOLIC ACID (DIALYVITE 387-XNSC 15 PO)    Take 1 tablet by mouth daily    CALCITRIOL (ROCALTROL) 0.25 MCG CAPSULE    Take 9 capsules by mouth three times a week    CINACALCET (SENSIPAR) 30 MG TABLET    Take 5 tablets by mouth three times a week    CIPROFLOXACIN (CIPRO) 500 MG TABLET    Take 1 tablet by mouth daily for 5 days    CLONIDINE (CATAPRES) 0.3 MG TABLET    Take 1 tablet by mouth 3 times daily    DOXAZOSIN (CARDURA) 4 MG TABLET    Take 1 tablet by mouth every 12 hours    FAMOTIDINE (PEPCID) 20 MG TABLET    Take 1 tablet by mouth daily    FERROUS SULFATE (FE TABS 325) 325 (65 FE) MG EC TABLET    Take 1 tablet by mouth 3 times daily (with meals)    FLUTICASONE (FLONASE) 50 MCG/ACT NASAL SPRAY    1 spray by Nasal route daily     FOLIC ACID (FOLVITE) 389 MCG TABLET    Take 800 mcg by mouth daily use: Not Currently     Comment: hx of    Sexual activity: Yes     Partners: Female       REVIEW OF SYSTEMS     Review of Systems  - CONSTITUTIONAL: Denies weight loss, fever and chills. - HEENT: Denies changes in vision and hearing.    -   RESPIRATORY: Denies SOB and cough. - CV: Denies palpitations and CP.       - GI: Denies abdominal pain, nausea, vomiting and diarrhea. Reports constipation      - : Denies dysuria and urinary frequency. - MSK: Denies myalgia and joint pain. - SKIN: Denies rash and pruritus.    -   NEUROLOGICAL: Denies headache and syncope. - PSYCHIATRIC: Denies recent changes in mood. Denies anxiety and depression. Except as noted above the remainder of the review of systems was reviewed and is.    PHYSICAL EXAM    (up to 7 for level 4, 8 or more for level 5)     ED Triage Vitals [10/16/22 1944]   BP Temp Temp Source Heart Rate Resp SpO2 Height Weight   (!) 191/90 98.4 °F (36.9 °C) Oral (!) 106 20 97 % 6' 3\" (1.905 m) 220 lb (99.8 kg)       Physical Exam  Constitutional:  Well developed, well nourished, no acute distress, non-toxic appearance   Eyes:  onjunctiva normal   HENT:  Atraumatic, external ears normal, nose normal, oropharynx moist  Respiratory:  No respiratory distress, normal breath sounds  Cardiovascular:  Normal rate, normal rhythm   GI:  Soft, nondistended, normal bowel sounds, nontender, no organomegaly, no mass, no rebound, no guarding   :  No costovertebral angle tenderness   Integument:  Well hydrated, no rash   Neurologic:  Alert & oriented x 3, no focal deficits noted   Psychiatric:  Speech and behavior appropriate  DIAGNOSTIC RESULTS     EKG:(none if blank)  All EKG's are interpreted by [facility] Phys[facility] who either signs or Co-signs this chart in the absence of a cardiologist.        RADIOLOGY: (none if blank)   Interpretation per the Radiologistbelow, if available at the time of this note:    XR ABDOMEN (KUB) (SINGLE AP VIEW) Final Result   Impression:   1. Nonobstructive bowel gas pattern. 2. Moderate amount stool in the colon. 2. Small right pleural effusion. Right basilar airspace disease. This document has been electronically signed by: Adrienne Romero MD on    10/16/2022 09:34 PM          LABS:  Labs Reviewed - No data to display    All other labs were within normal range or not returned as of this dictation. Please note, any cultures that may have been sent were not resulted at the time of this patient visit. EMERGENCY DEPARTMENT COURSE andMedical Decision Making:     MDM  /  ED Course as of 10/16/22 2240   Sun Oct 16, 2022   2238 Soft, nontender, nondistended abdomen. X-ray consistent with constipation. Afebrile with no concern for surgical intra-abdominal pathology at this time. Patient was given a fleets enema with complete resolution of his symptoms. Patient reports he feels much better and is ready to go home. He will follow-up in the outpatient setting with his primary care physician as well as a surgeon. Strict ED return precautions discussed. [AM]      ED Course User Index  [AM] Marciano Lopez MD     Small right pleural effusion expected as patient is status post VATS. Strict returnprecautions and follow up instructions were discussed with the patient with which the patient agrees    ED Medications administered this visit:    Medications   sodium phosphate (FLEET) enema 1 enema (has no administration in time range)         Procedures: (None if blank)       CLINICAL       1. Constipation, unspecified constipation type          DISPOSITION/PLAN   DISPOSITION    Discharge home    PATIENT REFERRED TO:  [place], [address] [place] [address]  [phone]    In 3 days          (Please note that portions of this note were completed with a voice recognition program.  Efforts were made to edit the dictations but occasionallywords are mis-transcribed. )      Marciano Lopez MD,(electronically signed)  Attending Physician, Emergency Department         Dilia Raines MD  10/16/22 9743

## 2022-10-17 NOTE — ED NOTES
Pt up on toilet at this time. Pt states he is able to have some movement of bowels.       Shadia Smith, RN  10/16/22 6767

## 2022-10-20 ENCOUNTER — APPOINTMENT (OUTPATIENT)
Dept: CT IMAGING | Age: 57
End: 2022-10-20
Payer: MEDICARE

## 2022-10-20 ENCOUNTER — HOSPITAL ENCOUNTER (EMERGENCY)
Age: 57
Discharge: HOME | End: 2022-10-20
Attending: EMERGENCY MEDICINE
Payer: MEDICARE

## 2022-10-20 VITALS
OXYGEN SATURATION: 100 % | DIASTOLIC BLOOD PRESSURE: 81 MMHG | SYSTOLIC BLOOD PRESSURE: 172 MMHG | WEIGHT: 222 LBS | BODY MASS INDEX: 27.6 KG/M2 | TEMPERATURE: 97.4 F | HEART RATE: 82 BPM | HEIGHT: 75 IN | RESPIRATION RATE: 20 BRPM

## 2022-10-20 DIAGNOSIS — G89.18 POSTOPERATIVE PAIN: Primary | ICD-10-CM

## 2022-10-20 DIAGNOSIS — N18.6 ESRD ON HEMODIALYSIS (HCC): ICD-10-CM

## 2022-10-20 DIAGNOSIS — Z99.2 ESRD ON HEMODIALYSIS (HCC): ICD-10-CM

## 2022-10-20 LAB
ALBUMIN SERPL-MCNC: 3.7 G/DL (ref 3.5–5.1)
ALP BLD-CCNC: 90 U/L (ref 38–126)
ALT SERPL-CCNC: < 5 U/L (ref 11–66)
ANION GAP SERPL CALCULATED.3IONS-SCNC: 12 MEQ/L (ref 8–16)
APTT: 34.2 SECONDS (ref 22–38)
AST SERPL-CCNC: 15 U/L (ref 5–40)
BASOPHILS # BLD: 0.4 %
BASOPHILS ABSOLUTE: 0 THOU/MM3 (ref 0–0.1)
BILIRUB SERPL-MCNC: 0.2 MG/DL (ref 0.3–1.2)
BUN BLDV-MCNC: 17 MG/DL (ref 7–22)
CALCIUM SERPL-MCNC: 9 MG/DL (ref 8.5–10.5)
CHLORIDE BLD-SCNC: 103 MEQ/L (ref 98–111)
CO2: 26 MEQ/L (ref 23–33)
CREAT SERPL-MCNC: 3.6 MG/DL (ref 0.4–1.2)
EOSINOPHIL # BLD: 2.2 %
EOSINOPHILS ABSOLUTE: 0.1 THOU/MM3 (ref 0–0.4)
ERYTHROCYTE [DISTWIDTH] IN BLOOD BY AUTOMATED COUNT: 13.3 % (ref 11.5–14.5)
ERYTHROCYTE [DISTWIDTH] IN BLOOD BY AUTOMATED COUNT: 51 FL (ref 35–45)
GFR SERPL CREATININE-BSD FRML MDRD: 19 ML/MIN/1.73M2
GLUCOSE BLD-MCNC: 94 MG/DL (ref 70–108)
HCT VFR BLD CALC: 30.9 % (ref 42–52)
HEMOGLOBIN: 9.4 GM/DL (ref 14–18)
IMMATURE GRANS (ABS): 0.02 THOU/MM3 (ref 0–0.07)
IMMATURE GRANULOCYTES: 0.4 %
INR BLD: 0.97 (ref 0.85–1.13)
LYMPHOCYTES # BLD: 9.5 %
LYMPHOCYTES ABSOLUTE: 0.5 THOU/MM3 (ref 1–4.8)
MCH RBC QN AUTO: 31.5 PG (ref 26–33)
MCHC RBC AUTO-ENTMCNC: 30.4 GM/DL (ref 32.2–35.5)
MCV RBC AUTO: 103.7 FL (ref 80–94)
MONOCYTES # BLD: 8.9 %
MONOCYTES ABSOLUTE: 0.4 THOU/MM3 (ref 0.4–1.3)
NUCLEATED RED BLOOD CELLS: 0 /100 WBC
OSMOLALITY CALCULATION: 282.6 MOSMOL/KG (ref 275–300)
PLATELET # BLD: 164 THOU/MM3 (ref 130–400)
PMV BLD AUTO: 10 FL (ref 9.4–12.4)
POTASSIUM SERPL-SCNC: 3.9 MEQ/L (ref 3.5–5.2)
PRO-BNP: ABNORMAL PG/ML (ref 0–900)
RBC # BLD: 2.98 MILL/MM3 (ref 4.7–6.1)
SEG NEUTROPHILS: 78.6 %
SEGMENTED NEUTROPHILS ABSOLUTE COUNT: 3.9 THOU/MM3 (ref 1.8–7.7)
SODIUM BLD-SCNC: 141 MEQ/L (ref 135–145)
TOTAL PROTEIN: 7 G/DL (ref 6.1–8)
TROPONIN T: 0.07 NG/ML
WBC # BLD: 4.9 THOU/MM3 (ref 4.8–10.8)

## 2022-10-20 PROCEDURE — 80053 COMPREHEN METABOLIC PANEL: CPT

## 2022-10-20 PROCEDURE — 6360000004 HC RX CONTRAST MEDICATION: Performed by: EMERGENCY MEDICINE

## 2022-10-20 PROCEDURE — 71275 CT ANGIOGRAPHY CHEST: CPT

## 2022-10-20 PROCEDURE — 85610 PROTHROMBIN TIME: CPT

## 2022-10-20 PROCEDURE — 93005 ELECTROCARDIOGRAM TRACING: CPT | Performed by: EMERGENCY MEDICINE

## 2022-10-20 PROCEDURE — 84484 ASSAY OF TROPONIN QUANT: CPT

## 2022-10-20 PROCEDURE — 85730 THROMBOPLASTIN TIME PARTIAL: CPT

## 2022-10-20 PROCEDURE — 83880 ASSAY OF NATRIURETIC PEPTIDE: CPT

## 2022-10-20 PROCEDURE — 99285 EMERGENCY DEPT VISIT HI MDM: CPT

## 2022-10-20 PROCEDURE — 36415 COLL VENOUS BLD VENIPUNCTURE: CPT

## 2022-10-20 PROCEDURE — 85025 COMPLETE CBC W/AUTO DIFF WBC: CPT

## 2022-10-20 RX ORDER — OXYCODONE HYDROCHLORIDE AND ACETAMINOPHEN 5; 325 MG/1; MG/1
1 TABLET ORAL EVERY 6 HOURS PRN
Qty: 12 TABLET | Refills: 0 | Status: SHIPPED | OUTPATIENT
Start: 2022-10-20 | End: 2022-10-23

## 2022-10-20 RX ADMIN — IOPAMIDOL 80 ML: 755 INJECTION, SOLUTION INTRAVENOUS at 14:05

## 2022-10-20 ASSESSMENT — PAIN DESCRIPTION - LOCATION: LOCATION: CHEST

## 2022-10-20 ASSESSMENT — PAIN SCALES - GENERAL: PAINLEVEL_OUTOF10: 8

## 2022-10-20 NOTE — ED PROVIDER NOTES
[address] ENCOUNTER      PATIENT NAME: Amanda Mancia  MRN: [id number]  : 1965  AGUILAR: 10/20/2022  PROVIDER: Tammie Agosto MD      CHIEF COMPLAINT       Chief Complaint   Patient presents with    Post-op Problem     Right lung surgery last Monday- d/c Saturday- states still has pain       Patient is seen and evaluated in a timely fashion. Nurses Notes are reviewed and I agree except as noted in the HPI. HISTORY OF PRESENT ILLNESS    Amanda Mancia is a 56 y.o. male who presents to Emergency Department with Post-op Problem (Right lung surgery last Monday- d/c Saturday- states still has pain)     Patient presents ED for evaluation of her right side chest pain and a surgical incision over last several days. S/p Right Thoracotomy & Decortication with right thoracotomy with complete decortication of lung, multiple wedge resections of lung from upper lobe middle lobe and lower lobe, cryoanalgesia to intercostal nerves due to right chest empyema status post failure of thrombolytic treatment trapped lung on 10/10/2022 (POD# 10) with Dr. Hamlet Kang.  Patient states no fever or chills. Pain is rated at 5/10. No worsening breathing difficulty. Chronically on oxygen via nasal cannula 24/7 after discharge. No nausea or vomiting. No abdominal pain. No diarrhea. Patient has ESRD on HD with scheduled Tuesday, Thursday and Saturday. He finished dialysis today. Other past medical history is listed as below. This HPI was provided by patient.      REVIEW OF SYSTEMS   Ten-point review of systems is negative except those documented in above HPI including constitutional, HEENT, respiratory, cardiovascular, gastrointestinal, genitourinary, musculoskeletal, skin, neurological, hematological and behavioral.      PAST MEDICAL HISTORY    has a past medical history of AAA (abdominal aortic aneurysm), NURIS (acute kidney injury) ([facility] [address] Anemia associated with chronic renal failure, Arthritis, Cocaine abuse ([facility] [address] Depression, Diabetes mellitus ([facility] [address] FSGS (focal segmental glomerulosclerosis), Hemodialysis patient ([facility] [address] Hemorrhoids, History of blood transfusion, Hyperlipidemia, Hyperphosphatemia, Hypertension, Left renal artery stenosis ([facility] [address] Monoclonal (M) protein disease, multiple 'M' protein, Nicotine dependence, Noncompliance, Pneumonia, Psychiatric problem, PTSD (post-traumatic stress disorder), Secondary hyperparathyroidism (of renal origin), and Sleep apnea. SURGICAL HISTORY      has a past surgical history that includes Leg Tendon Surgery; EKG 12 Lead (9/24/2015); Dialysis fistula creation (Left, 07/08/2016); vascular surgery (Left, 07/08/2016); vascular surgery (Right, 1990); Hand tendon surgery (Left, 4/5/2019); Abdominal aortic aneurysm repair; bronchoscopy (N/A, 10/6/2022); and thoracotomy (Right, 10/10/2022).     CURRENT MEDICATIONS       Discharge Medication List as of 10/20/2022  2:57 PM        CONTINUE these medications which have NOT CHANGED    Details   doxazosin (CARDURA) 4 MG tablet Take 1 tablet by mouth every 12 hours, Disp-30 tablet, R-0Normal      isosorbide mononitrate (IMDUR) 60 MG extended release tablet Take 1 tablet by mouth in the morning and at bedtime, Disp-30 tablet, R-3Normal      albuterol sulfate HFA (PROVENTIL;VENTOLIN;PROAIR) 108 (90 Base) MCG/ACT inhaler Inhale 2 puffs into the lungs every 6 hours as needed for Wheezing or Shortness of Breath, Disp-18 g, R-0Normal      calcitRIOL (ROCALTROL) 0.25 MCG capsule Take 9 capsules by mouth three times a week, Disp-30 capsule, R-0Normal      cinacalcet (SENSIPAR) 30 MG tablet Take 5 tablets by mouth three times a week, Disp-30 tablet, R-0Normal      ciprofloxacin (CIPRO) 500 MG tablet Take 1 tablet by mouth daily for 5 days, Disp-5 tablet, R-0Normal      !! atorvastatin (LIPITOR) 80 MG tablet TAKE 1/2 TABLET BY MOUTH ONCE DAILY IN THE EVENINGHistorical Med      sevelamer (RENVELA) 800 MG tablet Take 1,600 mg by mouthHistorical Med      Sucroferric Oxyhydroxide (VELPHORO) 500 MG CHEW Take 500 mg by mouth 4 times daily (before meals and nightly) Take with meals and snacksHistorical Med      Multiple Vitamins-Minerals (THERAPEUTIC MULTIVITAMIN-MINERALS) tablet Take 1 tablet by mouth dailyHistorical Med      folic acid (FOLVITE) 126 MCG tablet Take 800 mcg by mouth dailyHistorical Med      vitamin E 400 UNIT capsule Take 400 Units by mouth dailyHistorical Med      ferrous sulfate (FE TABS 325) 325 (65 Fe) MG EC tablet Take 1 tablet by mouth 3 times daily (with meals), Disp-270 tablet, R-3Normal      nitroGLYCERIN (NITROSTAT) 0.4 MG SL tablet up to max of 3 total doses.  If no relief after 1 dose, call 911., Disp-25 tablet, R-3Normal      acetaminophen (TYLENOL) 325 MG tablet Take 650 mg by mouth every 4 hours as needed for Pain or FeverHistorical Med      B Complex-C-Zn-Folic Acid (DIALYVITE 331-VYDE 15 PO) Take 1 tablet by mouth dailyHistorical Med      verapamil (CALAN SR) 240 MG extended release tablet Take 240 mg by mouth dailyHistorical Med      vitamin D (CHOLECALCIFEROL) 25 MCG (1000 UT) TABS tablet Take 1,000 Units by mouth 2 times daily Take two tablets twice a dayHistorical Med      cloNIDine (CATAPRES) 0.3 MG tablet Take 1 tablet by mouth 3 times daily, Disp-30 tablet, R-3Normal      famotidine (PEPCID) 20 MG tablet Take 1 tablet by mouth daily, Disp-60 tablet, R-0Normal      sertraline (ZOLOFT) 100 MG tablet Take 100 mg by mouth dailyHistorical Med      hydrOXYzine (ATARAX) 50 MG tablet Take 50 mg by mouth 3 times daily as needed for ItchingHistorical Med      aspirin 81 MG EC tablet Take 1 tablet by mouth daily, Disp-7 tablet, R-0Print      traZODone (DESYREL) 50 MG tablet Take 1 tablet by mouth nightly as needed for Sleep, Disp-7 tablet, R-0Print      !! atorvastatin (LIPITOR) 40 MG tablet Take 1 tablet by mouth nightly, Disp-7 tablet, R-0Print      fluticasone (FLONASE) 50 MCG/ACT nasal spray 1 spray by Nasal route daily Historical Med       !! - Potential duplicate medications found. Please discuss with provider. ALLERGIES     is allergic to humalog [insulin lispro], insulin regular human, and lisinopril. FAMILY HISTORY     He indicated that his mother is . He indicated that his father is . He indicated that his brother is . family history includes Cancer in his mother; Other in his brother. SOCIAL HISTORY      reports that he has been smoking cigarettes. He started smoking about 37 years ago. He has a 5.00 pack-year smoking history. He has never used smokeless tobacco. He reports that he does not currently use drugs. He reports that he does not drink alcohol. PHYSICAL EXAM      height is 6' 3\" (1.905 m) and weight is 222 lb (100.7 kg). His oral temperature is 97.4 °F (36.3 °C). His blood pressure is 172/81 (abnormal) and his pulse is 82. His respiration is 20 and oxygen saturation is 100%. Physical Exam  Vitals and nursing note reviewed. Constitutional:       Appearance: He is well-developed. He is not diaphoretic. HENT:      Head: Normocephalic and atraumatic. Nose: Nose normal.   Eyes:      General: No scleral icterus. Right eye: No discharge. Left eye: No discharge. Conjunctiva/sclera: Conjunctivae normal.      Pupils: Pupils are equal, round, and reactive to light. Neck:      Vascular: No JVD. Trachea: No tracheal deviation. Cardiovascular:      Rate and Rhythm: Normal rate and regular rhythm. Heart sounds: Normal heart sounds. No murmur heard. No friction rub. No gallop. Pulmonary:      Effort: Pulmonary effort is normal. No respiratory distress. Breath sounds: No stridor. Rhonchi and rales present. No wheezing. Comments: Right lateral chest with surgical incision clean dry intact, no discharge  Decreased breath sounds to right lung with right basilar rales. Chest:      Chest wall: No tenderness.    Abdominal: 1.0 - 4.8 thou/mm3    Monocytes Absolute 0.4 0.4 - 1.3 thou/mm3    Eosinophils Absolute 0.1 0.0 - 0.4 thou/mm3    Basophils Absolute 0.0 0.0 - 0.1 thou/mm3    Immature Grans (Abs) 0.02 0.00 - 0.07 thou/mm3    nRBC 0 /100 wbc   Comprehensive Metabolic Panel   Result Value Ref Range    Glucose 94 70 - 108 mg/dL    Creatinine 3.6 (HH) 0.4 - 1.2 mg/dL    BUN 17 7 - 22 mg/dL    Sodium 141 135 - 145 meq/L    Potassium 3.9 3.5 - 5.2 meq/L    Chloride 103 98 - 111 meq/L    CO2 26 23 - 33 meq/L    Calcium 9.0 8.5 - 10.5 mg/dL    AST 15 5 - 40 U/L    Alkaline Phosphatase 90 38 - 126 U/L    Total Protein 7.0 6.1 - 8.0 g/dL    Albumin 3.7 3.5 - 5.1 g/dL    Total Bilirubin 0.2 (L) 0.3 - 1.2 mg/dL    ALT <5 (L) 11 - 66 U/L   Brain Natriuretic Peptide   Result Value Ref Range    Pro-BNP 18527.0 (H) 0.0 - 900.0 pg/mL   Troponin   Result Value Ref Range    Troponin T 0.073 (A) ng/ml   APTT   Result Value Ref Range    aPTT 34.2 22.0 - 38.0 seconds   Protime-INR   Result Value Ref Range    INR 0.97 0.85 - 1.13   Glomerular Filtration Rate, Estimated   Result Value Ref Range    Est, Glom Filt Rate 19 (A) ml/min/1.73m2   Anion Gap   Result Value Ref Range    Anion Gap 12.0 8.0 - 16.0 meq/L   Osmolality   Result Value Ref Range    Osmolality Calc 282.6 275.0 - 300.0 mOsmol/kg   EKG Emergency   Result Value Ref Range    Ventricular Rate 83 BPM    Atrial Rate 83 BPM    P-R Interval 222 ms    QRS Duration 120 ms    Q-T Interval 408 ms    QTc Calculation (Bazett) 479 ms    P Axis 53 degrees    R Axis -9 degrees    T Axis 98 degrees       RADIOLOGY REPORTS  CTA CHEST W WO CONTRAST   Final Result       1. No pulmonary emboli   2. Interval development of right pleural effusion with numerous air-fluid levels and atelectasis or consolidation concerning for possible abscess. Some of the changes may be related to pelvic patient's recent surgery but abscess cannot be excluded.  Case    discussed with Dr. Iesha Cantrell               **This report has been created using voice recognition software. It may contain minor errors which are inherent in voice recognition technology. **      Final report electronically signed by Dr. Janet Littlejohn on 10/20/2022 2:26 PM          MEDICAL DECISION MAKING (MDM) AND ED COURSE   Patient is seen and evaluated at 11:35 AM EDT. DDx:. Postop pain, postop wound infection, PE, CHF, postop fluid collection    Labs are reassuring at his baselines. CTA chest images are reviewed and discussed with Dr. Enoc Jackson.  No suspicion patient has lung abscess, patient CT findings are more likely due to postop changes. Patient states he runs out off Percocet and limited amount is prescribed today. CTS follow-up as scheduled. Consult  Dr. Enoc Jackson     Procedures  None    Medications   iopamidol (ISOVUE-370) 76 % injection 80 mL (80 mLs IntraVENous Given 10/20/22 1405)       Vitals:    10/20/22 1059 10/20/22 1207 10/20/22 1258 10/20/22 1415   BP: (!) 206/74  (!) 159/76 (!) 172/81   Pulse: 89 85 85 82   Resp:  20 20    Temp:       TempSrc:       SpO2:  96% 100%    Weight: 222 lb (100.7 kg)      Height: 6' 3\" (1.905 m)        FINAL IMPRESSION AND DISPOSITION      1. Postoperative pain    2. ESRD on hemodialysis [facility])        DISPOSITION Decision To Discharge 10/20/2022 02:55:32 PM      PATIENT REFERRED TO:  Marry Garvey MD  . [place] [address] [address]  [phone]      As scheduled  DISCHARGE MEDICATIONS:  Discharge Medication List as of 10/20/2022  2:57 PM        START taking these medications    Details   oxyCODONE-acetaminophen (PERCOCET) 5-325 MG per tablet Take 1 tablet by mouth every 6 hours as needed for Pain for up to 3 days. Intended supply: 3 days.  Take lowest dose possible to manage pain, Disp-12 tablet, R-0Normal           (Please note that portions of this note were completed with a voice recognition program.  Efforts were made to edit the dictations but occasionally words aremis-transcribed.)  MD German Kirkland, MD  10/20/22 8104

## 2022-10-20 NOTE — ED NOTES
Pt and family updated on POC- need for CTA. Pt appears in no acute distress, VSS. States he is currently out of his pain pills that he had been sent home with after his procedure.  Wife remains at [facility], RN  10/20/22 9494

## 2022-10-21 ENCOUNTER — TELEPHONE (OUTPATIENT)
Dept: PULMONOLOGY | Age: 57
End: 2022-10-21

## 2022-10-21 LAB
EKG ATRIAL RATE: 83 BPM
EKG P AXIS: 53 DEGREES
EKG P-R INTERVAL: 222 MS
EKG Q-T INTERVAL: 408 MS
EKG QRS DURATION: 120 MS
EKG QTC CALCULATION (BAZETT): 479 MS
EKG R AXIS: -9 DEGREES
EKG T AXIS: 98 DEGREES
EKG VENTRICULAR RATE: 83 BPM

## 2022-10-21 PROCEDURE — 93010 ELECTROCARDIOGRAM REPORT: CPT | Performed by: INTERNAL MEDICINE

## 2022-10-21 NOTE — TELEPHONE ENCOUNTER
I have not seen this patient before. WADE Hernandez CNP  saw while patient was hospitalized. Mayra Lea, can you assist with recommendations for patient? Thanks!

## 2022-10-21 NOTE — TELEPHONE ENCOUNTER
Patient called saying he had a chest tube and it was removed and he has dressings on it but he wants to know if he can take the dressings off and just use regular band aides? He cant get in with pcp until the 31st and doesn't seen you until December. Please advise, thank you.

## 2022-10-21 NOTE — TELEPHONE ENCOUNTER
Patient underwent right side thoracotomy with pneumolysis, wedge resection, and chest tube placement by Cardiothoracic surgery Team in [facility]. Will have office staff notify patient and give office number for CTS office advise to Keep in place until he speaks with them.

## 2022-10-24 LAB — AFB CULTURE & SMEAR: NORMAL

## 2022-10-25 ENCOUNTER — TELEPHONE (OUTPATIENT)
Dept: CARDIOTHORACIC SURGERY | Age: 57
End: 2022-10-25

## 2022-10-25 NOTE — TELEPHONE ENCOUNTER
I lvm with Cardiovascular to call and reach out to this patient. IF they have any questions to call our office.

## 2022-10-25 NOTE — TELEPHONE ENCOUNTER
10/14/2022 9:13 AM  Surgeon:  Dr. Naresh Jarvis: Mr. Isis Hernandez is resting comfortably in dialysis this morning. Pt denies chest pressure, SOB, fever,chills, N/V/D. He is on RA currently. Chest tubes removed yesterday. S/p Right Thoracotomy with decort and wedge resections POD # 4       Plan:   CXR reviewed- Continue daily CXR's      Continue current therapy. The plan of care was discussed in detail with Dr. Vy Azul      Electronically signed by Yuliana Briggs PA-C on 10/14/2022 at 9:13 AM      Status post complete decortication of lung  Good reexpansion  May still have a small entrapped portion given residual airspace on chest x-ray but this will resolve over time. The lung will either expand or the space will fill with fluid. Overall the entire lung has been reexpanded. Chest tubes are out  Signing off please have pt follow-up with pulmonary    Jessica Mcadams called Pulmonary -  asking if he could take the dressings off. Per Pulmonary note- ask CT/CV surgery    Spoke with Erich Easley to take dressings off-  if still drainage he can cover with bandaid. When patient takes dressings off, if he has concerns he can call the office and we will make an appointment. Patient verbalized understanding. Maddi Ramirez do you agree?

## 2022-10-28 RX ORDER — DOXAZOSIN MESYLATE 4 MG/1
4 TABLET ORAL EVERY 12 HOURS SCHEDULED
Qty: 30 TABLET | Refills: 0 | OUTPATIENT
Start: 2022-10-28

## 2022-10-30 LAB
FUNGUS IDENTIFIED: NORMAL
FUNGUS SMEAR: NORMAL

## 2022-11-07 RX ORDER — ALBUTEROL SULFATE 90 UG/1
2 AEROSOL, METERED RESPIRATORY (INHALATION) EVERY 6 HOURS PRN
Qty: 8.5 G | OUTPATIENT
Start: 2022-11-07

## 2022-12-01 ENCOUNTER — TELEPHONE (OUTPATIENT)
Dept: PULMONOLOGY | Age: 57
End: 2022-12-01

## 2022-12-01 DIAGNOSIS — J90 PLEURAL EFFUSION, RIGHT: ICD-10-CM

## 2022-12-01 DIAGNOSIS — J18.9 PNEUMONIA OF RIGHT LUNG DUE TO INFECTIOUS ORGANISM, UNSPECIFIED PART OF LUNG: Primary | ICD-10-CM

## 2022-12-01 NOTE — TELEPHONE ENCOUNTER
Patient coming in for a hospital follow up with you on the 14th. Looks like he needed a Cxr prior to this appointment. Are you able to place one please?

## 2022-12-10 ENCOUNTER — HOSPITAL ENCOUNTER (OUTPATIENT)
Age: 57
Discharge: HOME | End: 2022-12-10
Payer: MEDICARE

## 2022-12-10 ENCOUNTER — HOSPITAL ENCOUNTER (OUTPATIENT)
Dept: GENERAL RADIOLOGY | Age: 57
Discharge: HOME | End: 2022-12-10
Payer: MEDICARE

## 2022-12-10 DIAGNOSIS — J90 PLEURAL EFFUSION, RIGHT: ICD-10-CM

## 2022-12-10 DIAGNOSIS — J18.9 PNEUMONIA OF RIGHT LUNG DUE TO INFECTIOUS ORGANISM, UNSPECIFIED PART OF LUNG: ICD-10-CM

## 2022-12-10 PROCEDURE — 71046 X-RAY EXAM CHEST 2 VIEWS: CPT
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
## 2022-12-12 RX ORDER — ISOSORBIDE MONONITRATE 60 MG/1
60 TABLET, EXTENDED RELEASE ORAL 2 TIMES DAILY
Qty: 30 TABLET | Refills: 3 | OUTPATIENT
Start: 2022-12-12

## 2022-12-14 ENCOUNTER — OFFICE VISIT (OUTPATIENT)
Dept: PULMONOLOGY | Age: 57
End: 2022-12-14
Payer: MEDICARE

## 2022-12-14 VITALS
WEIGHT: 229 LBS | HEART RATE: 102 BPM | HEIGHT: 75 IN | SYSTOLIC BLOOD PRESSURE: 164 MMHG | BODY MASS INDEX: 28.47 KG/M2 | OXYGEN SATURATION: 92 % | DIASTOLIC BLOOD PRESSURE: 74 MMHG | TEMPERATURE: 99.6 F

## 2022-12-14 DIAGNOSIS — R06.02 SHORTNESS OF BREATH: Primary | ICD-10-CM

## 2022-12-14 DIAGNOSIS — R06.83 SNORING: ICD-10-CM

## 2022-12-14 DIAGNOSIS — R06.81 WITNESSED EPISODE OF APNEA: ICD-10-CM

## 2022-12-14 DIAGNOSIS — G47.30 SLEEP APNEA, UNSPECIFIED TYPE: ICD-10-CM

## 2022-12-14 DIAGNOSIS — R68.2 DRY MOUTH: ICD-10-CM

## 2022-12-14 DIAGNOSIS — J90 PLEURAL EFFUSION, RIGHT: ICD-10-CM

## 2022-12-14 DIAGNOSIS — J93.9 PNEUMOTHORAX ON RIGHT: ICD-10-CM

## 2022-12-14 DIAGNOSIS — I50.32 CHRONIC HEART FAILURE WITH PRESERVED EJECTION FRACTION (HCC): ICD-10-CM

## 2022-12-14 DIAGNOSIS — Z98.890 STATUS POST THORACOTOMY: ICD-10-CM

## 2022-12-14 DIAGNOSIS — N18.6 END STAGE RENAL DISEASE (HCC): ICD-10-CM

## 2022-12-14 DIAGNOSIS — Z87.891 PERSONAL HISTORY OF TOBACCO USE: ICD-10-CM

## 2022-12-14 PROCEDURE — G8427 DOCREV CUR MEDS BY ELIG CLIN: HCPCS

## 2022-12-14 PROCEDURE — 99214 OFFICE O/P EST MOD 30 MIN: CPT

## 2022-12-14 PROCEDURE — 3078F DIAST BP <80 MM HG: CPT

## 2022-12-14 PROCEDURE — G8484 FLU IMMUNIZE NO ADMIN: HCPCS

## 2022-12-14 PROCEDURE — G8419 CALC BMI OUT NRM PARAM NOF/U: HCPCS

## 2022-12-14 PROCEDURE — 94618 PULMONARY STRESS TESTING: CPT

## 2022-12-14 PROCEDURE — 3074F SYST BP LT 130 MM HG: CPT

## 2022-12-14 PROCEDURE — 4004F PT TOBACCO SCREEN RCVD TLK: CPT

## 2022-12-14 PROCEDURE — 3017F COLORECTAL CA SCREEN DOC REV: CPT

## 2022-12-14 RX ORDER — ALBUTEROL SULFATE 90 UG/1
2 AEROSOL, METERED RESPIRATORY (INHALATION) EVERY 6 HOURS PRN
Qty: 18 G | Refills: 0 | Status: SHIPPED | OUTPATIENT
Start: 2022-12-14

## 2022-12-14 ASSESSMENT — ENCOUNTER SYMPTOMS
SHORTNESS OF BREATH: 1
WHEEZING: 0
CHEST TIGHTNESS: 0
SINUS PRESSURE: 0
SINUS PAIN: 0
RHINORRHEA: 1
BACK PAIN: 1
COUGH: 0

## 2022-12-14 NOTE — PATIENT INSTRUCTIONS
We will obtain a breathing test and chest x-ray before your next visit. We will repeat your sleep study    Continue your albuterol inhaler. You may take 2 puffs every 6 hours as needed for shortness of breath or wheezing. You will need 2 lpm of oxygen when walking.     [facility] Cardiothoracic and Vascular Surgery - Elie Olmstead MD  [address], [address]  Alyse Rogers, [address]  [phone]

## 2023-01-09 RX ORDER — ALBUTEROL SULFATE 90 UG/1
2 AEROSOL, METERED RESPIRATORY (INHALATION) EVERY 6 HOURS PRN
Qty: 8.5 G | Refills: 2 | Status: SHIPPED | OUTPATIENT
Start: 2023-01-09

## 2023-01-09 NOTE — TELEPHONE ENCOUNTER
Received refill request for ALBUTEROL HFA INH (200 PUFFS)8.5GM. Medication was last ordered by Tulio Arriola . Medication was last ordered on 12/14/22 with 0 refills. Patient was last seen in the office 12/14/22. Patient has a scheduled follow up 2/13/23. Medication needs to be sent to [id number] - [place], [address] [place] [address] [phone]  Pharmacy.

## 2023-02-04 ENCOUNTER — APPOINTMENT (OUTPATIENT)
Dept: GENERAL RADIOLOGY | Age: 58
DRG: 640 | End: 2023-02-04
Payer: MEDICARE

## 2023-02-04 ENCOUNTER — HOSPITAL ENCOUNTER (INPATIENT)
Age: 58
LOS: 3 days | Discharge: HOME | DRG: 640 | End: 2023-02-07
Attending: EMERGENCY MEDICINE
Payer: MEDICARE

## 2023-02-04 DIAGNOSIS — J96.01 ACUTE RESPIRATORY FAILURE WITH HYPOXIA (HCC): Primary | ICD-10-CM

## 2023-02-04 DIAGNOSIS — J81.0 ACUTE PULMONARY EDEMA (HCC): ICD-10-CM

## 2023-02-04 DIAGNOSIS — E87.5 HYPERKALEMIA: ICD-10-CM

## 2023-02-04 PROBLEM — J96.21 ACUTE ON CHRONIC RESPIRATORY FAILURE WITH HYPOXIA (HCC): Status: ACTIVE | Noted: 2023-02-04

## 2023-02-04 LAB
ANION GAP SERPL CALC-SCNC: 16 MEQ/L (ref 8–16)
BASOPHILS ABSOLUTE: 0 THOU/MM3 (ref 0–0.1)
BASOPHILS NFR BLD AUTO: 0.3 %
BUN SERPL-MCNC: 80 MG/DL (ref 7–22)
CALCIUM SERPL-MCNC: 8.6 MG/DL (ref 8.5–10.5)
CHLORIDE SERPL-SCNC: 104 MEQ/L (ref 98–111)
CO2 SERPL-SCNC: 21 MEQ/L (ref 23–33)
CREAT SERPL-MCNC: 13 MG/DL (ref 0.4–1.2)
DEPRECATED RDW RBC AUTO: 56.5 FL (ref 35–45)
EOSINOPHIL NFR BLD AUTO: 2.3 %
EOSINOPHILS ABSOLUTE: 0.2 THOU/MM3 (ref 0–0.4)
ERYTHROCYTE [DISTWIDTH] IN BLOOD BY AUTOMATED COUNT: 15.2 % (ref 11.5–14.5)
FLUAV RNA RESP QL NAA+PROBE: NOT DETECTED
FLUBV RNA RESP QL NAA+PROBE: NOT DETECTED
GFR SERPL CREATININE-BSD FRML MDRD: 4 ML/MIN/1.73M2
GLUCOSE SERPL-MCNC: 122 MG/DL (ref 70–108)
HCT VFR BLD AUTO: 39.7 % (ref 42–52)
HGB BLD-MCNC: 11.8 GM/DL (ref 14–18)
IMM GRANULOCYTES # BLD AUTO: 0.08 THOU/MM3 (ref 0–0.07)
IMM GRANULOCYTES NFR BLD AUTO: 1.1 %
LYMPHOCYTES ABSOLUTE: 0.6 THOU/MM3 (ref 1–4.8)
LYMPHOCYTES NFR BLD AUTO: 8.1 %
MAGNESIUM SERPL-MCNC: 2.8 MG/DL (ref 1.6–2.4)
MCH RBC QN AUTO: 30.3 PG (ref 26–33)
MCHC RBC AUTO-ENTMCNC: 29.7 GM/DL (ref 32.2–35.5)
MCV RBC AUTO: 101.8 FL (ref 80–94)
MONOCYTES ABSOLUTE: 0.5 THOU/MM3 (ref 0.4–1.3)
MONOCYTES NFR BLD AUTO: 6.6 %
NEUTROPHILS NFR BLD AUTO: 81.6 %
NRBC BLD AUTO-RTO: 0 /100 WBC
NT-PROBNP SERPL IA-MCNC: ABNORMAL PG/ML (ref 0–124)
OSMOLALITY SERPL CALC.SUM OF ELEC: 306.6 MOSMOL/KG (ref 275–300)
PHOSPHATE SERPL-MCNC: 6.1 MG/DL (ref 2.4–4.7)
PLATELET # BLD AUTO: 101 THOU/MM3 (ref 130–400)
PMV BLD AUTO: 11.7 FL (ref 9.4–12.4)
POTASSIUM SERPL-SCNC: 5.9 MEQ/L (ref 3.5–5.2)
RBC # BLD AUTO: 3.9 MILL/MM3 (ref 4.7–6.1)
SARS-COV-2 RNA RESP QL NAA+PROBE: NOT DETECTED
SEGMENTED NEUTROPHILS ABSOLUTE COUNT: 6 THOU/MM3 (ref 1.8–7.7)
SODIUM SERPL-SCNC: 141 MEQ/L (ref 135–145)
TROPONIN T: 0.06 NG/ML
WBC # BLD AUTO: 7.3 THOU/MM3 (ref 4.8–10.8)

## 2023-02-04 PROCEDURE — 96365 THER/PROPH/DIAG IV INF INIT: CPT

## 2023-02-04 PROCEDURE — 71046 X-RAY EXAM CHEST 2 VIEWS: CPT

## 2023-02-04 PROCEDURE — 83880 ASSAY OF NATRIURETIC PEPTIDE: CPT

## 2023-02-04 PROCEDURE — 96375 TX/PRO/DX INJ NEW DRUG ADDON: CPT

## 2023-02-04 PROCEDURE — 99222 1ST HOSP IP/OBS MODERATE 55: CPT | Performed by: INTERNAL MEDICINE

## 2023-02-04 PROCEDURE — 83735 ASSAY OF MAGNESIUM: CPT

## 2023-02-04 PROCEDURE — 2060000000 HC ICU INTERMEDIATE R&B

## 2023-02-04 PROCEDURE — 6360000002 HC RX W HCPCS: Performed by: EMERGENCY MEDICINE

## 2023-02-04 PROCEDURE — 2500000003 HC RX 250 WO HCPCS: Performed by: EMERGENCY MEDICINE

## 2023-02-04 PROCEDURE — 99285 EMERGENCY DEPT VISIT HI MDM: CPT

## 2023-02-04 PROCEDURE — 84484 ASSAY OF TROPONIN QUANT: CPT

## 2023-02-04 PROCEDURE — 99223 1ST HOSP IP/OBS HIGH 75: CPT | Performed by: PHYSICIAN ASSISTANT

## 2023-02-04 PROCEDURE — 2700000000 HC OXYGEN THERAPY PER DAY

## 2023-02-04 PROCEDURE — 84100 ASSAY OF PHOSPHORUS: CPT

## 2023-02-04 PROCEDURE — 96374 THER/PROPH/DIAG INJ IV PUSH: CPT

## 2023-02-04 PROCEDURE — 85025 COMPLETE CBC W/AUTO DIFF WBC: CPT

## 2023-02-04 PROCEDURE — 93005 ELECTROCARDIOGRAM TRACING: CPT | Performed by: EMERGENCY MEDICINE

## 2023-02-04 PROCEDURE — 6360000002 HC RX W HCPCS: Performed by: PHYSICIAN ASSISTANT

## 2023-02-04 PROCEDURE — 80048 BASIC METABOLIC PNL TOTAL CA: CPT

## 2023-02-04 PROCEDURE — 87636 SARSCOV2 & INF A&B AMP PRB: CPT

## 2023-02-04 PROCEDURE — 2580000003 HC RX 258: Performed by: PHYSICIAN ASSISTANT

## 2023-02-04 PROCEDURE — 36415 COLL VENOUS BLD VENIPUNCTURE: CPT

## 2023-02-04 PROCEDURE — 6370000000 HC RX 637 (ALT 250 FOR IP): Performed by: PHYSICIAN ASSISTANT

## 2023-02-04 PROCEDURE — 90935 HEMODIALYSIS ONE EVALUATION: CPT

## 2023-02-04 RX ORDER — ISOSORBIDE MONONITRATE 60 MG/1
60 TABLET, EXTENDED RELEASE ORAL 2 TIMES DAILY
Status: DISCONTINUED | OUTPATIENT
Start: 2023-02-04 | End: 2023-02-05

## 2023-02-04 RX ORDER — CALCIUM GLUCONATE 10 MG/ML
1000 INJECTION, SOLUTION INTRAVENOUS ONCE
Status: COMPLETED | OUTPATIENT
Start: 2023-02-04 | End: 2023-02-04

## 2023-02-04 RX ORDER — FERROUS SULFATE 325(65) MG
325 TABLET ORAL
Status: DISCONTINUED | OUTPATIENT
Start: 2023-02-05 | End: 2023-02-07 | Stop reason: HOSPADM

## 2023-02-04 RX ORDER — FAMOTIDINE 20 MG/1
20 TABLET, FILM COATED ORAL DAILY
Status: DISCONTINUED | OUTPATIENT
Start: 2023-02-04 | End: 2023-02-07 | Stop reason: HOSPADM

## 2023-02-04 RX ORDER — TRAZODONE HYDROCHLORIDE 50 MG/1
50 TABLET ORAL NIGHTLY PRN
Status: DISCONTINUED | OUTPATIENT
Start: 2023-02-04 | End: 2023-02-07 | Stop reason: HOSPADM

## 2023-02-04 RX ORDER — ATORVASTATIN CALCIUM 40 MG/1
40 TABLET, FILM COATED ORAL NIGHTLY
Status: DISCONTINUED | OUTPATIENT
Start: 2023-02-04 | End: 2023-02-07 | Stop reason: HOSPADM

## 2023-02-04 RX ORDER — ACETAMINOPHEN 650 MG/1
650 SUPPOSITORY RECTAL EVERY 6 HOURS PRN
Status: DISCONTINUED | OUTPATIENT
Start: 2023-02-04 | End: 2023-02-07 | Stop reason: HOSPADM

## 2023-02-04 RX ORDER — DOXAZOSIN MESYLATE 4 MG/1
4 TABLET ORAL EVERY 12 HOURS SCHEDULED
Status: DISCONTINUED | OUTPATIENT
Start: 2023-02-04 | End: 2023-02-04

## 2023-02-04 RX ORDER — VITAMIN B COMPLEX
1000 TABLET ORAL 2 TIMES DAILY
Status: DISCONTINUED | OUTPATIENT
Start: 2023-02-04 | End: 2023-02-07 | Stop reason: HOSPADM

## 2023-02-04 RX ORDER — CLONIDINE HYDROCHLORIDE 0.1 MG/1
0.1 TABLET ORAL 3 TIMES DAILY
Status: DISCONTINUED | OUTPATIENT
Start: 2023-02-04 | End: 2023-02-06

## 2023-02-04 RX ORDER — SERTRALINE HYDROCHLORIDE 100 MG/1
100 TABLET, FILM COATED ORAL DAILY
Status: DISCONTINUED | OUTPATIENT
Start: 2023-02-04 | End: 2023-02-07 | Stop reason: HOSPADM

## 2023-02-04 RX ORDER — SODIUM CHLORIDE 9 MG/ML
INJECTION, SOLUTION INTRAVENOUS PRN
Status: DISCONTINUED | OUTPATIENT
Start: 2023-02-04 | End: 2023-02-07 | Stop reason: HOSPADM

## 2023-02-04 RX ORDER — POLYETHYLENE GLYCOL 3350 17 G/17G
17 POWDER, FOR SOLUTION ORAL DAILY PRN
Status: DISCONTINUED | OUTPATIENT
Start: 2023-02-04 | End: 2023-02-07 | Stop reason: HOSPADM

## 2023-02-04 RX ORDER — HEPARIN SODIUM 5000 [USP'U]/ML
5000 INJECTION, SOLUTION INTRAVENOUS; SUBCUTANEOUS EVERY 8 HOURS SCHEDULED
Status: DISCONTINUED | OUTPATIENT
Start: 2023-02-04 | End: 2023-02-07 | Stop reason: HOSPADM

## 2023-02-04 RX ORDER — SEVELAMER CARBONATE 800 MG/1
1600 TABLET, FILM COATED ORAL
Status: DISCONTINUED | OUTPATIENT
Start: 2023-02-04 | End: 2023-02-07 | Stop reason: HOSPADM

## 2023-02-04 RX ORDER — CETIRIZINE HYDROCHLORIDE 10 MG/1
10 TABLET ORAL DAILY
Status: DISCONTINUED | OUTPATIENT
Start: 2023-02-04 | End: 2023-02-07 | Stop reason: HOSPADM

## 2023-02-04 RX ORDER — CALCITRIOL 0.25 UG/1
2.25 CAPSULE, LIQUID FILLED ORAL
Status: DISCONTINUED | OUTPATIENT
Start: 2023-02-06 | End: 2023-02-07 | Stop reason: HOSPADM

## 2023-02-04 RX ORDER — VERAPAMIL HYDROCHLORIDE 240 MG/1
240 TABLET, FILM COATED, EXTENDED RELEASE ORAL DAILY
Status: DISCONTINUED | OUTPATIENT
Start: 2023-02-05 | End: 2023-02-07 | Stop reason: HOSPADM

## 2023-02-04 RX ORDER — ACETAMINOPHEN 325 MG/1
650 TABLET ORAL EVERY 6 HOURS PRN
Status: DISCONTINUED | OUTPATIENT
Start: 2023-02-04 | End: 2023-02-07 | Stop reason: HOSPADM

## 2023-02-04 RX ORDER — ASPIRIN 81 MG/1
81 TABLET ORAL DAILY
Status: DISCONTINUED | OUTPATIENT
Start: 2023-02-04 | End: 2023-02-07 | Stop reason: HOSPADM

## 2023-02-04 RX ORDER — NITROGLYCERIN 20 MG/100ML
5-200 INJECTION INTRAVENOUS CONTINUOUS
Status: DISCONTINUED | OUTPATIENT
Start: 2023-02-04 | End: 2023-02-05

## 2023-02-04 RX ORDER — ONDANSETRON 4 MG/1
4 TABLET, ORALLY DISINTEGRATING ORAL EVERY 8 HOURS PRN
Status: DISCONTINUED | OUTPATIENT
Start: 2023-02-04 | End: 2023-02-07 | Stop reason: HOSPADM

## 2023-02-04 RX ORDER — DOXAZOSIN MESYLATE 4 MG/1
4 TABLET ORAL EVERY 12 HOURS SCHEDULED
Status: DISCONTINUED | OUTPATIENT
Start: 2023-02-04 | End: 2023-02-06

## 2023-02-04 RX ORDER — SODIUM CHLORIDE 0.9 % (FLUSH) 0.9 %
5-40 SYRINGE (ML) INJECTION EVERY 12 HOURS SCHEDULED
Status: DISCONTINUED | OUTPATIENT
Start: 2023-02-04 | End: 2023-02-07 | Stop reason: HOSPADM

## 2023-02-04 RX ORDER — SODIUM CHLORIDE 0.9 % (FLUSH) 0.9 %
5-40 SYRINGE (ML) INJECTION PRN
Status: DISCONTINUED | OUTPATIENT
Start: 2023-02-04 | End: 2023-02-07 | Stop reason: HOSPADM

## 2023-02-04 RX ORDER — FLUTICASONE PROPIONATE 50 MCG
1 SPRAY, SUSPENSION (ML) NASAL DAILY
Status: DISCONTINUED | OUTPATIENT
Start: 2023-02-04 | End: 2023-02-07 | Stop reason: HOSPADM

## 2023-02-04 RX ORDER — FUROSEMIDE 10 MG/ML
80 INJECTION INTRAMUSCULAR; INTRAVENOUS ONCE
Status: COMPLETED | OUTPATIENT
Start: 2023-02-04 | End: 2023-02-04

## 2023-02-04 RX ORDER — ISOSORBIDE MONONITRATE 60 MG/1
60 TABLET, EXTENDED RELEASE ORAL 2 TIMES DAILY
Status: DISCONTINUED | OUTPATIENT
Start: 2023-02-04 | End: 2023-02-04

## 2023-02-04 RX ORDER — ONDANSETRON 2 MG/ML
4 INJECTION INTRAMUSCULAR; INTRAVENOUS EVERY 6 HOURS PRN
Status: DISCONTINUED | OUTPATIENT
Start: 2023-02-04 | End: 2023-02-07 | Stop reason: HOSPADM

## 2023-02-04 RX ADMIN — SERTRALINE 100 MG: 100 TABLET, FILM COATED ORAL at 16:47

## 2023-02-04 RX ADMIN — SEVELAMER CARBONATE 1600 MG: 800 TABLET, FILM COATED ORAL at 16:47

## 2023-02-04 RX ADMIN — Medication 1000 UNITS: at 16:47

## 2023-02-04 RX ADMIN — SODIUM CHLORIDE, PRESERVATIVE FREE 10 ML: 5 INJECTION INTRAVENOUS at 20:40

## 2023-02-04 RX ADMIN — Medication 1000 UNITS: at 22:05

## 2023-02-04 RX ADMIN — DOXAZOSIN 4 MG: 4 TABLET ORAL at 16:11

## 2023-02-04 RX ADMIN — FLUTICASONE PROPIONATE 1 SPRAY: 50 SPRAY, METERED NASAL at 16:47

## 2023-02-04 RX ADMIN — CLONIDINE HYDROCHLORIDE 0.1 MG: 0.1 TABLET ORAL at 16:11

## 2023-02-04 RX ADMIN — NITROGLYCERIN 5 MCG/MIN: 20 INJECTION INTRAVENOUS at 08:16

## 2023-02-04 RX ADMIN — ATORVASTATIN CALCIUM 40 MG: 40 TABLET, FILM COATED ORAL at 22:05

## 2023-02-04 RX ADMIN — ISOSORBIDE MONONITRATE 60 MG: 60 TABLET, EXTENDED RELEASE ORAL at 16:11

## 2023-02-04 RX ADMIN — SODIUM BICARBONATE 50 MEQ: 84 INJECTION, SOLUTION INTRAVENOUS at 07:02

## 2023-02-04 RX ADMIN — CALCIUM GLUCONATE 1000 MG: 10 INJECTION, SOLUTION INTRAVENOUS at 06:57

## 2023-02-04 RX ADMIN — CETIRIZINE HYDROCHLORIDE 10 MG: 10 TABLET, FILM COATED ORAL at 16:47

## 2023-02-04 RX ADMIN — HEPARIN SODIUM 5000 UNITS: 5000 INJECTION INTRAVENOUS; SUBCUTANEOUS at 20:40

## 2023-02-04 RX ADMIN — HEPARIN SODIUM 5000 UNITS: 5000 INJECTION INTRAVENOUS; SUBCUTANEOUS at 16:12

## 2023-02-04 RX ADMIN — FAMOTIDINE 20 MG: 20 TABLET, FILM COATED ORAL at 16:11

## 2023-02-04 RX ADMIN — FUROSEMIDE 80 MG: 10 INJECTION, SOLUTION INTRAMUSCULAR; INTRAVENOUS at 06:57

## 2023-02-04 RX ADMIN — ASPIRIN 81 MG: 81 TABLET, COATED ORAL at 16:12

## 2023-02-04 RX ADMIN — CLONIDINE HYDROCHLORIDE 0.1 MG: 0.1 TABLET ORAL at 22:05

## 2023-02-04 RX ADMIN — SODIUM CHLORIDE, PRESERVATIVE FREE 10 ML: 5 INJECTION INTRAVENOUS at 16:15

## 2023-02-04 ASSESSMENT — PAIN - FUNCTIONAL ASSESSMENT: PAIN_FUNCTIONAL_ASSESSMENT: NONE - DENIES PAIN

## 2023-02-04 ASSESSMENT — PAIN SCALES - GENERAL: PAINLEVEL_OUTOF10: 0

## 2023-02-04 NOTE — FLOWSHEET NOTE
02/04/23 1445   Vital Signs   BP (!) 243/115   Temp 97.3 °F (36.3 °C)   Heart Rate 99   Resp 16   SpO2 100 %   Weight 216 lb 0.8 oz (98 kg)   Weight Method Standing scale   Percent Weight Change -4.85   Post-Hemodialysis Assessment   Post-Treatment Procedures Blood returned; Access bleeding time < 10 minutes   Machine Disinfection Process Acid/Vinegar Clean;Heat Disinfect; Exterior Machine Disinfection   Rinseback Volume (ml) 400 ml   Blood Volume Processed (Liters) 113.2 l/min   Dialyzer Clearance Lightly streaked   Duration of Treatment (minutes) 270 minutes   Heparin Amount Administered During Treatment (mL) 0 mL   Hemodialysis Intake (ml) 400 ml   Hemodialysis Output (ml) 5400 ml   NET Removed (ml) 5000   stable 4.5 hour treatment. 5 liters net uf. on high flow and nitro drip throughout treatment. pressure held both cannulation sites x 10 minutes. dressing dry and intact. report given to primary nurse.

## 2023-02-04 NOTE — ED NOTES
Report received from Greg [place] and care assumed at this time.       Earlene Bauman RN  02/04/23 4894

## 2023-02-04 NOTE — ED PROVIDER NOTES
[facility]  EMERGENCY MEDICINE ATTENDING ATTESTATION      Evaluation of Hector Fleming. Case discussed and care plan developed with resident physician. I agree with the resident physician documentation and plan as documented by her, except if my documentation differs. Patient seen, interviewed and examined by me. I reviewed the medical, surgical, family and social history, medications and allergies. I have reviewed the nursing documentation. Brief H&P   Patient c/o shortness of breath today. Patient states he could not go to his last dialysis session as he had to go to his brothers .  Today complains of significant shortness of breath, expectorating frothy sputum. States he does make urine sometimes. Physical exam is notable for well appearing, tachypneic, bibasal rales. Dialysis fistula on the left upper extremity. Medical Decision Making   MDM:   Acute pulmonary edema from fluid overload  Likely hyperkalemia given the peaked T waves on EKG  Plan:   IV line, labs  Start treatment for hyperkalemia  EKG  Imaging  Observation in the ED while awaiting results  Consult nephrology team for dialysis  Patient will need to be admitted    Please see the resident physician completed note for final disposition except as documented on this attestation. I have reviewed and interpreted all available lab, radiology and ekg results available at the moment. Diagnosis, treatment and disposition plans were discussed and agreed upon by patient. This transcription was electronically signed. It was dictated by use of voice recognition software and electronically transcribed. The transcription may contain errors not detected in proofreading.      I performed direct supervision and was present for the critical portion following procedures: None  Critical care time on this case: See critical care addendum below    CRITICAL CARE ADDENDUM:  This patient was identified as critically ill on my evaluation due to acute pulmonary edema and, requiring vital signs stabilization, supplemental oxygen, acute treatment for hyperkalemia in addition to discussions with consultants, evaluation of patient's response to treatment, examination of patient, obtaining history from patient or surrogate, ordering and performing treatments and interventions, ordering and review of laboratory studies, ordering and review of radiographic studies, pulse oximetry, re-evaluation of patient's condition and review of old charts. There is a high probability of imminent or life-threatening deterioration in the patients condition. A total time of 30 minutes has been spent by me providing critical care to this patient, excluding separately billable procedures. I personally supervised all procedures and actions performed on this patient. I agree with the resident physician's assessment and plan of care except as modified by me or on my addendum.       Electronically signed by Cass Mccain MD on 2/4/23 at 6:27 AM ROCK Mccain MD  02/04/23 0890

## 2023-02-04 NOTE — ED NOTES
Contacted inpatient dialysis who advise that they do not currently have orders for this patient and will reach out to nephrology and call back.      Rocael Bardales RN  02/04/23 3056

## 2023-02-04 NOTE — H&P
Hospitalist History & Physical    Patient:  Silvestre Villanueva    Unit/Bed:  YOB: 1965  MRN: [id number]   Acct: [de-identified]   PCP: WADE Davenport CNP  Code Status: Full Code    Date of Service: Pt seen/examined on 23 and admitted to Inpatient with expected LOS greater than two midnights due to medical therapy. Chief Complaint: shortness of breath    Assessment/Plan:    Acute on chronic hypoxic respiratory failure  Secondary to pulmonary edema. Patient on 2 L nasal cannula at baseline. Requiring high flow nasal cannula in emergency department. Chest x-ray with findings suggestive of pulmonary edema, likely secondary to missed dialysis and HFpEF. Dialysis this morning. ABG pending. Wean oxygen as tolerated  ESRD on HD  Dialysis on Tuesday, Thursday, Saturday. Follows with Dr. Layla Lou. Patient states he missed dialysis on Thursday due to attending a family member's .  Nephrology following. Patient will be dialyzed this morning. Hyperkalemia  Secondary to ESRD with missed dialysis  Calcium, bicarb, Lasix given in emergency department  Dialysis this morning as above  Hypertensive urgency/Hypertension  Likely secondary to missed dialysis session. Patient reports compliance with outpatient medication regimen, but does endorse significantly elevated blood pressures at home as well as labile blood pressures. Nitro drip started emergency department. Will monitor closely as patient undergoes dialysis. Anticipate hypertension will resolve following dialysis. Plan to restart patient's antihypertensives this evening. HFpEF with acute exacerbation  Most recent echo with EF of 60%, significant left ventricular hypertrophy  Dialysis as above  Daily weights. Low salt diet. 2L fluid restriction. Productive cough, hemoptysis  Likely secondary to viral bronchitis. Patient endorses fever x3 days and blood-streaked productive cough for the last day and a half.   No fevers at this time. No leukocytosis. Patient endorses improvement of symptoms. Will defer antibiotics at this time. Continue supportive care  Allergic rhinitis  Continue flonase  Patient previously taking hydroxyzine 3 times daily. Discussed with patient who is agreeable to trial of 2nd generation antihistamine. Start Zyrtec 10mg daily. History of thoracic aortic aneurysm  S/p stent placement  Hyperlipidemia  Lipitor   GERD  Pepcid  Depression, PTSD  Zoloft  History of REZA  Patient states controlled with weight loss. No longer using CPAP. History of Present Illness:  Tammie Prescott is a 66 y.o. male with a history of end stage renal disease on hemodialysis, chronic hypoxic respiratory failure on 2 L nasal cannula at baseline, HFpEF, hypertension, prior cocaine abuse, allergic rhinitis, history of thoracic aortic aneurysm status post stent placement, hyperlipidemia, GERD, depression, PTSD, and history of obstructive sleep apnea who presented to [facility] with chief complaint of shortness of breath. The patient is a dialysis patient and undergoes dialysis on Tuesday, Thursday, and Saturday. He states he missed his Thursday dialysis session as he was attending a  of a family member who passed away. Additionally, the patient has felt unwell for the last few days. He initially began having a fever and chills, which has since subsided. However, he is having a productive cough and is coughing up phlegm. Overall, he states his symptoms are improving. He does have a history of chronic hypoxic respiratory failure and is on 2 L nasal cannula outpatient. In the emergency department, the patient was noted to be requiring high flow nasal cannula. His chest x-ray demonstrates evidence of fluid overload. Nephrology was consulted and the patient will be undergoing urgent dialysis this morning. He denies any new lower extremity edema, chest pain, palpitations, abdominal pain, nausea, vomiting, constipation, or diarrhea. He states he does produce a minimal amount of urine from time to time. He endorses compliance with his home medication regimen, but notes that his blood pressures tend to run high at home, as high as systolics in the 534R, but are labile and often dropped low after he undergoes dialysis. He has a history of prior cocaine abuse, but denies any recent cocaine use. Review of Systems: Pertinent positives as noted in the HPI. All other systems reviewed and negative. Past Medical History:        Diagnosis Date    AAA (abdominal aortic aneurysm)     NURIS (acute kidney injury) ([facility] 9/24/2015    Anemia associated with chronic renal failure     Arthritis     stated in hands    Cocaine abuse ([facility] 5/10/2014    Depression     Diabetes mellitus ([facility]     pt states he no longer has diabetes he has lost alot of davion.      FSGS (focal segmental glomerulosclerosis) 5/23/2013    Hemodialysis patient ([facility] 10/17/2016    on hemodialysis with [facility] 1/16/2012    History of blood transfusion     Hyperlipidemia     Hyperphosphatemia 5/21/2016    Hypertension     Left renal artery stenosis ([facility] 5/22/2014    Monoclonal (M) protein disease, multiple 'M' protein     Nicotine dependence 6/16/2014    Noncompliance     Pneumonia     Psychiatric problem     PTSD (post-traumatic stress disorder)     Pt vet from [place]    Secondary hyperparathyroidism (of renal origin)     Sleep apnea        Past Surgical History:        Procedure Laterality Date    ABDOMINAL AORTIC ANEURYSM REPAIR      BRONCHOSCOPY N/A 10/6/2022    BRONCHOSCOPY performed by Arnoldo Zafar MD at [address] Left 07/08/2016    EKG 12-LEAD  9/24/2015         HAND TENDON SURGERY Left 4/5/2019    LEFT THUMB FLEXOR TENDON REPAIR performed by Mike Boucher MD at [facility] Right 10/10/2022    Right Thoracotomy & Decortication with Partial Lung Resection performed by Radha Sharma MD at [facility] Left 07/08/2016    AV Fistula    VASCULAR SURGERY Right 1990    Surgery on Achilles tendon       Home Medications:   Current Facility-Administered Medications on File Prior to Encounter   Medication Dose Route Frequency Provider Last Rate Last Admin    HYDROmorphone (DILAUDID) injection 1 mg  1 mg IntraVENous Once Angie Miller MD        lidocaine (LMX) 4 % cream   Topical Once Angie Miller MD        midazolam (VERSED) injection 1 mg  1 mg IntraVENous Once Angie Miller MD         Current Outpatient Medications on File Prior to Encounter   Medication Sig Dispense Refill    albuterol sulfate HFA (PROVENTIL;VENTOLIN;PROAIR) 108 (90 Base) MCG/ACT inhaler INHALE 2 PUFFS INTO THE LUNGS EVERY 6 HOURS AS NEEDED FOR WHEEZING OR SHORTNESS OF BREATH 8.5 g 2    doxazosin (CARDURA) 4 MG tablet Take 1 tablet by mouth every 12 hours 30 tablet 0    isosorbide mononitrate (IMDUR) 60 MG extended release tablet Take 1 tablet by mouth in the morning and at bedtime 30 tablet 3    calcitRIOL (ROCALTROL) 0.25 MCG capsule Take 9 capsules by mouth three times a week 30 capsule 0    cinacalcet (SENSIPAR) 30 MG tablet Take 5 tablets by mouth three times a week 30 tablet 0    atorvastatin (LIPITOR) 80 MG tablet TAKE 1/2 TABLET BY MOUTH ONCE DAILY IN THE EVENING      sevelamer (RENVELA) 800 MG tablet Take 1,600 mg by mouth      Sucroferric Oxyhydroxide (VELPHORO) 500 MG CHEW Take 500 mg by mouth 4 times daily (before meals and nightly) Take with meals and snacks      Multiple Vitamins-Minerals (THERAPEUTIC MULTIVITAMIN-MINERALS) tablet Take 1 tablet by mouth daily      folic acid (FOLVITE) 229 MCG tablet Take 800 mcg by mouth daily      vitamin E 400 UNIT capsule Take 400 Units by mouth daily      ferrous sulfate (FE TABS 325) 325 (65 Fe) MG EC tablet Take 1 tablet by mouth 3 times daily (with meals) (Patient taking differently: Take 325 mg by mouth daily (with breakfast)) 270 tablet 3    nitroGLYCERIN (NITROSTAT) 0.4 MG SL tablet up to max of 3 total doses. If no relief after 1 dose, call 911. 25 tablet 3    acetaminophen (TYLENOL) 325 MG tablet Take 650 mg by mouth every 4 hours as needed for Pain or Fever      B Complex-C-Zn-Folic Acid (DIALYVITE 026-KIXL 15 PO) Take 1 tablet by mouth daily      verapamil (CALAN SR) 240 MG extended release tablet Take 240 mg by mouth daily      vitamin D (CHOLECALCIFEROL) 25 MCG (1000 UT) TABS tablet Take 1,000 Units by mouth 2 times daily Take two tablets twice a day      cloNIDine (CATAPRES) 0.3 MG tablet Take 1 tablet by mouth 3 times daily 30 tablet 3    famotidine (PEPCID) 20 MG tablet Take 1 tablet by mouth daily 60 tablet 0    sertraline (ZOLOFT) 100 MG tablet Take 100 mg by mouth daily      hydrOXYzine (ATARAX) 50 MG tablet Take 50 mg by mouth 3 times daily as needed for Itching      aspirin 81 MG EC tablet Take 1 tablet by mouth daily 7 tablet 0    traZODone (DESYREL) 50 MG tablet Take 1 tablet by mouth nightly as needed for Sleep 7 tablet 0    atorvastatin (LIPITOR) 40 MG tablet Take 1 tablet by mouth nightly 7 tablet 0    fluticasone (FLONASE) 50 MCG/ACT nasal spray 1 spray by Nasal route daily          Allergies:    Humalog [insulin lispro], Insulin regular human, and Lisinopril    Social History:    reports that he has been smoking cigarettes. He started smoking about 37 years ago. He has a 5.00 pack-year smoking history. He has never used smokeless tobacco. He reports that he does not currently use drugs. He reports that he does not drink alcohol. Family History:       Problem Relation Age of Onset    Cancer Mother     Other Brother         aneurysm        Diet:  ADULT DIET; Regular; Low Sodium (2 gm); Low Potassium (Less than 3000 mg/day);  Low Phosphorus (Less than 1000 mg); 2000 ml      Physical Exam:  BP (!) 218/150   Pulse (!) 101   Temp 98 °F (36.7 °C) (Oral)   Resp 15   Ht 6' 3\" (1.905 m)   Wt 219 lb 9.3 oz (99.6 kg)   SpO2 97%   BMI 27.45 kg/m²   General: Alert, in no acute distress, cooperative  HEENT:  Normocephalic and atraumatic. No scleral icterus. Pupils equal, round, and reactive to light. External nares without deformity. External ears normal.  Neck: Supple. No JVD. No thyromegaly. No cervical lymphadenopathy. Lungs: On high flow nasal cannula. Respiratory rate normal, effort mildly increased with accessory muscle use. Lungs clear to auscultation bilaterally in superior fields, diminished in inferior fields with fine bibasilar crackles  Cardiac: Regular rate and rhythm. 4/6 systolic murmur best auscultated at mitral post, no rubs or gallops  Abdomen: Nondistended, soft, no tenderness to palpation, guarding, rigidity. Bowel sounds normoactive. Extremities:  No clubbing, cyanosis, or lower extremity edema. Vasculature: capillary refill < 3 seconds. Lower extremity pulses 2+ bilaterally  Skin:  Warm and dry. No rashes or lesions. Psych: Mood normal.  Affect appropriate. Neurologic: Alert and oriented x4. No cranial nerve deficits. No extremity weakness. Data: (All radiographs, tracings, PFTs, and imaging are personally viewed and interpreted unless otherwise noted)  Labs:   Recent Labs     02/04/23  0605   WBC 7.3   HGB 11.8*   HCT 39.7*   *     Recent Labs     02/04/23  0605      K 5.9*      CO2 21*   BUN 80*   CREATININE 13.0*   CALCIUM 8.6   PHOS 6.1*     No results for input(s): AST, ALT, BILIDIR, BILITOT, ALKPHOS in the last 72 hours. No results for input(s): INR in the last 72 hours. No results for input(s): [place] in the last 72 hours.   Urinalysis:   Lab Results   Component Value Date/Time    NITRU NEGATIVE 04/03/2018 07:45 PM    WBCUA 2-4 04/03/2018 07:45 PM    BACTERIA NONE 04/03/2018 07:45 PM    RBCUA 5-10 04/03/2018 07:45 PM    BLOODU SMALL 04/03/2018 07:45 PM    SPECGRAV 1.014 03/07/2018 09:10 PM    GLUCOSEU NEGATIVE 04/03/2018 07:45 PM       EKG: sinus tach, incomplete LBBB, LVH    Radiology:  XR CHEST (2 VW)    Result Date: 2/4/2023  2 view chest x-ray Comparison: None Findings: There is cardiomegaly. Thoracic aortic stent graft noted. Small bilateral pleural effusions with hazy bibasilar airspace opacities worsened from prior study. No pneumothorax. 1. Cardiomegaly with small pleural effusions and hazy bibasilar airspace opacities which may indicate edema or pneumonia. This document has been electronically signed by: Luz Maria Cardenas MD on 02/04/2023 06:37 AM        DVT prophylaxis: heparin    Diet: ADULT DIET; Regular; Low Sodium (2 gm); Low Potassium (Less than 3000 mg/day); Low Phosphorus (Less than 1000 mg); 2000 ml    Fluids: n/a    Code Status: Full Code    PT/OT Eval Status: Active and ongoing    Tele:   [x] yes             [] no      Thank you WADE Bagley - MEL for the opportunity to be involved in this patient's care.     Electronically signed by Darius Blum PA-C on 2/4/2023 at 9:03 AM

## 2023-02-04 NOTE — ED TRIAGE NOTES
Pt presents to the ED via LFD c/o sob. Pt states that he missed dialysis and was getting up to go to dialysis this morning when his sob became more short of breath. Pt states that he has been congested and coughing up dark colored sputum for a couple days now. Pt is alert and oriented, pt wears 4lpm nasal cannula at home, respirations are equal and labored with visible retractions noted. EKG obtained

## 2023-02-04 NOTE — ED NOTES
ED to inpatient nurses report    Chief Complaint   Patient presents with    Shortness of Breath      Present to ED from home  LOC: alert and orientated to name, place, date  Vital signs   Vitals:    02/04/23 0630 02/04/23 0721 02/04/23 0815 02/04/23 0902   BP:  (!) 203/96 (!) 218/150 (!) 196/94   Pulse: (!) 103 (!) 101 (!) 101 98   Resp: 21 15  18   Temp:       TempSrc:       SpO2: 98% 97%  99%   Weight:       Height:          Oxygen Baseline 90% 6L    Current needs required High Flow Bipap/Cpap No  LDAs:   Peripheral IV 02/04/23 Proximal;Right; Anterior Forearm (Active)       Peripheral IV 02/04/23 Distal;Right; Anterior Forearm (Active)     Mobility: Independent  Pending ED orders: Transport to Dialysis  Present condition: Stable      C-SSRS    Swallow Screening    Preferred Language: English     Electronically signed by David Gracia RN on 2/4/2023 at [address] [place] [address],[address] Corinne Person, [address] [place] [address]  02/04/23 5740

## 2023-02-04 NOTE — CONSULTS
[facility]    [facility]  [address]  [place], [address]  [address] [facility] Consult      2/4/2023 12:17 PM         Pt Name:    Marc Streeter  MRN:     [id number]   [id number]  YOB: 1965  Admit Date:    2/4/2023  5:49 AM  Primary Care Physician:  WADE Gill CNP   Room Number:  Kalyn Heller /MAYO   Reason for Consult:  Management of hemodialysis  Requesting Providor: [id number] [facility] ER  Primary Nephrologist: Dr. Caitlyn Pittman    ### ISOLATION ###:   none     Admit Chief Complaint: shortness of breath     History of Chief Complaint:   The patient is a 66y.o. year old black male who receives maintenance hemodialysis at [facility] under the care of Dr. Caitlyn Pittman to treat ESRD from hypertension. He explained that he had two funerals to go to and missed dialysis on last Thursday. He does not make urine. He tried to limit his fluid intake and tried to hold off until dialysis but became acutely short of breath and was taken by ambulance to ER where he was found to have hypoxia. Dialysis staff explained that he frequently misses dialysis. He denied chest pain but his lungs feel tight. He is coughing up creamy, thick, yello/green sputum. He has had scant hemoptysis and his blood pressure in ER was quite high. He we was started on IV nitroglycerin drip. Nephrology is following the patient for: ESRD requiring hemodialysis     24 hour summary:   The patient was seen while undergoing routine hemodialysis in the inpatient dialysis unit. He feels improved already.       Baseline eGFR    Admit eGFR    Current eGFR        eGFR trend    Lab Results   Component Value Date/Time    LABGLOM 4 02/04/2023 06:05 AM    LABGLOM 19 10/20/2022 11:58 AM    LABGLOM 11 10/15/2022 03:46 AM    LABGLOM 7 10/14/2022 03:35 AM    LABGLOM 10 10/13/2022 06:28 AM    LABGLOM 7 10/12/2022 04:51 AM    LABGLOM 10 10/11/2022 03:36 AM    LABGLOM 6 10/10/2022 03:35 AM    LABGLOM 7 10/09/2022 04:25 AM    [facility] 10/08/2022 03:47 AM    LABGLOM 7 10/07/2022 04:34 AM    LABGLOM 10 10/06/2022 04:15 AM          Compliance with treatment plan: 70%     Allergies and Intolerances: ALLERGIES: Humalog [insulin lispro], Insulin regular human, and Lisinopril  MEDICATION INTOLERANCES: none  FOOD ALLERGIES: none  INSECT ALLERGIES: none  PLANT ALLERGIES: none     Past Medical History:  Past Medical History:   Diagnosis Date    AAA (abdominal aortic aneurysm)     NURIS (acute kidney injury) ([facility] 9/24/2015    Anemia associated with chronic renal failure     Arthritis     stated in hands    Cocaine abuse ([facility] 5/10/2014    Depression     Diabetes mellitus [facility]     pt states he no longer has diabetes he has lost alot of davion.      FSGS (focal segmental glomerulosclerosis) 5/23/2013    Hemodialysis patient ([facility] 10/17/2016    on hemodialysis with [facility] 1/16/2012    History of blood transfusion     Hyperlipidemia     Hyperphosphatemia 5/21/2016    Hypertension     Left renal artery stenosis ([facility] 5/22/2014    Monoclonal (M) protein disease, multiple 'M' protein     Nicotine dependence 6/16/2014    Noncompliance     Pneumonia     Psychiatric problem     PTSD (post-traumatic stress disorder)     Pt vet from [place]    Secondary hyperparathyroidism (of renal origin)     Sleep apnea         Past Surgical History:  Past Surgical History:   Procedure Laterality Date    ABDOMINAL AORTIC ANEURYSM REPAIR      BRONCHOSCOPY N/A 10/6/2022    BRONCHOSCOPY performed by Leanna Zeng MD at [facility] Left 07/08/2016    EKG 12-LEAD  9/24/2015         HAND TENDON SURGERY Left 4/5/2019    LEFT THUMB FLEXOR TENDON REPAIR performed by Ta Carmen MD at [address] [place] [address] Right 10/10/2022    Right Thoracotomy & Decortication with Partial Lung Resection performed by Elina Snell MD at [place] [address] Left 07/08/2016    AV Fistula VASCULAR SURGERY Right 1990    Surgery on Achilles tendon        Family History:  Family History   Problem Relation Age of Onset    Cancer Mother     Other Brother         aneurysm         Social History:  Social History     Socioeconomic History    Marital status:      Spouse name: Andrea    Number of children: 2    Years of education: 12    Highest education level: Not on file   Occupational History     Employer: UNEMPLOYED   Tobacco Use    Smoking status: Some Days     Packs/day: 0.25     Years: 20.00     Pack years: 5.00     Types: Cigarettes     Start date: 10/11/1985    Smokeless tobacco: Never    Tobacco comments:     occasional   Vaping Use    Vaping Use: Never used   Substance and Sexual Activity    Alcohol use: No    Drug use: Not Currently     Comment: hx of    Sexual activity: Yes     Partners: Female   Other Topics Concern    Not on file   Social History Narrative    Not on file     Social Determinants of Health     Financial Resource Strain: Not on file   Food Insecurity: Not on file   Transportation Needs: Not on file   Physical Activity: Not on file   Stress: Not on file   Social Connections: Not on file   Intimate Partner Violence: Not on file   Housing Stability: Not on file        Home Medications:  Prior to Admission medications    Medication Sig Start Date End Date Taking?  Authorizing Provider   albuterol sulfate HFA (PROVENTIL;VENTOLIN;PROAIR) 108 (90 Base) MCG/ACT inhaler INHALE 2 PUFFS INTO THE LUNGS EVERY 6 HOURS AS NEEDED FOR WHEEZING OR SHORTNESS OF BREATH 1/9/23   Michelle Brown APRN - CNP   doxazosin (CARDURA) 4 MG tablet Take 1 tablet by mouth every 12 hours 10/15/22   Pilar Gupta MD   isosorbide mononitrate (IMDUR) 60 MG extended release tablet Take 1 tablet by mouth in the morning and at bedtime 10/15/22   Pilar Gupta MD   calcitRIOL (ROCALTROL) 0.25 MCG capsule Take 9 capsules by mouth three times a week 10/17/22   Pilar Gupta MD   cinacalcet (SENSIPAR) 30 MG tablet Take 5 tablets by mouth three times a week 10/17/22   Pilar Gupta MD   atorvastatin (LIPITOR) 80 MG tablet TAKE 1/2 TABLET BY MOUTH ONCE DAILY IN THE EVENING 9/2/22   Historical Provider, MD   sevelamer (RENVELA) 800 MG tablet Take 1,600 mg by mouth    Historical Provider, MD   Sucroferric Oxyhydroxide (VELPHORO) 500 MG CHEW Take 500 mg by mouth 4 times daily (before meals and nightly) Take with meals and snacks    Historical Provider, MD   Multiple Vitamins-Minerals (THERAPEUTIC MULTIVITAMIN-MINERALS) tablet Take 1 tablet by mouth daily    Historical Provider, MD   folic acid (FOLVITE) 686 MCG tablet Take 800 mcg by mouth daily    Historical Provider, MD   vitamin E 400 UNIT capsule Take 400 Units by mouth daily    Historical Provider, MD   ferrous sulfate (FE TABS 325) 325 (65 Fe) MG EC tablet Take 1 tablet by mouth 3 times daily (with meals)  Patient taking differently: Take 325 mg by mouth daily (with breakfast) 3/13/20   Breann Haji MD   nitroGLYCERIN (NITROSTAT) 0.4 MG SL tablet up to max of 3 total doses.  If no relief after 1 dose, call 911. 2/26/20   Breann Haji MD   acetaminophen (TYLENOL) 325 MG tablet Take 650 mg by mouth every 4 hours as needed for Pain or Fever    Historical Provider, MD   B Complex-C-Zn-Folic Acid (DIALYVITE 783-YDLC 15 PO) Take 1 tablet by mouth daily    Historical Provider, MD   verapamil (CALAN SR) 240 MG extended release tablet Take 240 mg by mouth daily    Historical Provider, MD   vitamin D (CHOLECALCIFEROL) 25 MCG (1000 UT) TABS tablet Take 1,000 Units by mouth 2 times daily Take two tablets twice a day    Historical Provider, MD   cloNIDine (CATAPRES) 0.3 MG tablet Take 1 tablet by mouth 3 times daily 1/28/20 9/28/22  WADE Acevedo CNP   famotidine (PEPCID) 20 MG tablet Take 1 tablet by mouth daily 1/28/20   WADE Acevedo CNP   sertraline (ZOLOFT) 100 MG tablet Take 100 mg by mouth daily    Historical Provider, MD   hydrOXYzine (ATARAX) 50 MG tablet Take 50 mg by mouth 3 times daily as needed for Itching    Historical Provider, MD   aspirin 81 MG EC tablet Take 1 tablet by mouth daily 3/23/18   Shireen Cho MD   traZODone (DESYREL) 50 MG tablet Take 1 tablet by mouth nightly as needed for Sleep 3/23/18   Shireen Cho MD   atorvastatin (LIPITOR) 40 MG tablet Take 1 tablet by mouth nightly 3/23/18   Shireen Cho MD   fluticasone (FLONASE) 50 MCG/ACT nasal spray 1 spray by Nasal route daily     Historical Provider, MD        Lab data:  CBC:    Recent Labs     02/04/23  0605   WBC 7.3   HGB 11.8*   *     CMP:    Recent Labs     02/04/23  0605      K 5.9*      CO2 21*   BUN 80*   CREATININE 13.0*   GLUCOSE 122*   CALCIUM 8.6     Urine:No results for input(s): COLORU, PHUR, LABCAST, WBCUA, RBCUA, MUCUS, TRICHOMONAS, YEAST, BACTERIA, CLARITYU, SPECGRAV, LEUKOCYTESUR, UROBILINOGEN, Margueritte Buckle in the last 72 hours. Invalid input(s): NITRATE, GLUCOSEUKETONESUAMORPHOUS   Sed Rate: No results for input(s): SEDRATE in the last 72 hours. Radiology       Review of Systems:  Source of data: patient and chart    CONSTITUTIONAL  Fever: none, Chills: none, Weight loss: none, Malaise: yes, Fatigue: yes, Diaphoresis: none, Weakness: none    SKIN  Rash: none, Itch: none, Open sores: none    HENT:  Headache: none, Hearing loss: none, Tinnitus: none, Ear pain: none, Ear discharge: none,   Nasal bleeding: none, Congestion: none, Stridor: none, Sore throat: none. EYES  Blurred vision: none, Double vision: none, Photophobia: none, Eye pain: none, Eye discharge: none, Eye redness: none. CARDIOVASCULAR  Chest pain: none, Arm pain: none, Palpitations: none, Orthopnea: none, Claudication: none,  Leg swelling: none, PND: none.      RESPIRATORY  Cough: yes, Hemoptysis: scant, Sputum production: creamy, yellow/green, Shortness of breath: none, Wheezing: none    GASTROINTESTINAL  Heartburn: none, Nausea: none, Vomiting: none, Abdominal pain: none, Diarrhea: none,  Constipation: none, Blood in the stool: none, Melena: none, Rebound: none, Rovsing's: none. GENITOURINARY  Dysuria: none, Pyuria: none, Gross hematuria: none, Urgency: none, Flank pain: none, STD: none. MUSCULOSKELETAL  Myalgia: none, Neck pain: none, Thoracic pain: none, Lumbar pain: none, Joint pain: none, Falls: none    ENDOCRINE/HEMATOLOGY/ALLERGIC  Easy bruising: none, Easy bleeding: none, Environmental allergies: none, Polydipsia: none. NEUROLOGICAL  Dizziness: none, Tingling: none, Numbness: none, Tremor: none, Sensory change: none, Speech change: none, Focal weakness: none, Seizures: none, Loss of consciousness: none,    PSYCHIATRIC  Depression: none, Suicidal ideation: none, Heroin abuse: none, Cocaine abuse: none, Marijuana abuse: none, Hallucinations: none, Anxiety: none, Memory loss: none       Physical Examination:  BP (!) 230/116   Pulse 100   Temp 97.7 °F (36.5 °C)   Resp 24   Ht 6' 3\" (1.905 m)   Wt 227 lb 1.2 oz (103 kg)   SpO2 96%   BMI 28.38 kg/m²       General appearance: alert and cooperative with exam  HEENT: Head: Normocephalic, no lesions, without obvious abnormality. Neck: no adenopathy, no carotid bruit, no JVD, supple, symmetrical, trachea midline, and thyroid not enlarged, symmetric, no tenderness/mass/nodules  Lungs: scattered rales and rhonchi. Heart: regular rate and rhythm, S1, S2 normal, no murmur, click, rub or gallop  Abdomen: soft, non-tender; bowel sounds normal; no masses,  no organomegaly  Extremities: extremities normal, atraumatic, no cyanosis or edema  Neurologic: Mental status: Alert, oriented, thought content appropriate  PSY: No evidence of depression. Mood is normal for the patient. Skin: Warm and dry. No unusual lesions or rashes noted. Muscles: Hand  is equal and strong bilaterally. Leg strength is equal and strong. Skeletal: No bony or skeletal abnormalities noted.     Admission weight: 219 lb 9.3 oz (99.6 kg)  Wt Readings from Last 3 Encounters:   02/04/23 227 lb 1.2 oz (103 kg)   12/14/22 229 lb (103.9 kg)   10/20/22 222 lb (100.7 kg)     Body mass index is 28.38 kg/m².     Clinical Impressions:    ESRD  HTN-uncontrolled  COPD  Iron deficient anemia.      Plan of Care    Urgent dialysis.  Wean NTG drip as tolerated.          Scott Fisher DO  [facility].

## 2023-02-04 NOTE — ED NOTES
Dr. Earline Aase updated on patient's systolic pressure, awaiting new orders.      David Gracia RN  02/04/23 [id number]

## 2023-02-04 NOTE — ED NOTES
Pt transported to [facility] by cart in stable condition. Called [facility]A and informed them that the patient was on their way to the unit.       Sacha Romero, SAMUEL  27/78/42 [id number]

## 2023-02-04 NOTE — ED NOTES
ED nurse-to-nurse bedside report    Chief Complaint   Patient presents with    Shortness of Breath      LOC: alert and orientated to name, place, date  Vital signs   Vitals:    02/04/23 0551 02/04/23 0607 02/04/23 0630   BP: (!) 137/95 (!) 126/99    Pulse: (!) 106 (!) 106 (!) 103   Resp: 22 24 21   Temp: 98 °F (36.7 °C)     TempSrc: Oral     SpO2: 90% (!) 89% 98%   Weight: 219 lb 9.3 oz (99.6 kg)     Height: 6' 3\" (1.905 m)        Pain:    Pain Interventions: none needed at this time  Pain Goal: 2  Oxygen: Yes    Current needs required heated high flow nasal cannula    Telemetry: Yes  LDAs:   Peripheral IV 02/04/23 Proximal;Right; Anterior Forearm (Active)       Peripheral IV 02/04/23 Distal;Right; Anterior Forearm (Active)     Continuous Infusions:    albuterol       Mobility: Independent  Ramos Fall Risk Score: No flowsheet data found.   Fall Interventions: side rails raised  Report given to: Ed Redd RN  02/04/23 1564

## 2023-02-04 NOTE — ED PROVIDER NOTES
[address] [address]          Pt Name: Nicci Coreas  MRN: [id number]  [place] 1965  Date of evaluation: 2/4/2023  Emergency Physician: Kelsie Newell MD    CHIEF COMPLAINT       Chief Complaint   Patient presents with    Shortness of Breath     History obtained from the patient. HISTORY OF PRESENT ILLNESS    HPI  Nicci Coreas is a 66 y.o. male who presents to the emergency department for evaluation of SOB    The patient states that he has been having shortness of breath and coughing up rust colored and thick sputum for about a day and half. He states that he is normally on 4 to 6 L/min nasal cannula supplemental oxygen at home, but he is not sure what respiratory condition he has. In the ED, he is at 89 to 90% on 6 L/min and still using accessory muscles. Will place patient on high flow nasal cannula. He was trying to make it to his Saturday morning dialysis, but his shortness of breath got too bad. He normally has dialysis on Tuesday, Thursday, Saturday. He missed his dialysis appointment on Thursday as well. Admitted to the hospital for acute hypoxic and hypercapnic respiratory failure secondary to ESRD, pulmonary edema. Respiratory cultures October 2022 grew Citrobacter koseri. Right thoracotomy and decortication with partial lung resection on October 10, 2022. The patient has no other acute complaints at this time. REVIEW OF SYSTEMS   Review of Systems    See HPI. PAST MEDICAL AND SURGICAL HISTORY     Past Medical History:   Diagnosis Date    AAA (abdominal aortic aneurysm)     NURIS (acute kidney injury) ([facility] [address] 9/24/2015    Anemia associated with chronic renal failure     Arthritis     stated in hands    Cocaine abuse ([facility] [address] 5/10/2014    Depression     Diabetes mellitus ([facility] [address]     pt states he no longer has diabetes he has lost alot of davion.      FSGS (focal segmental glomerulosclerosis) 5/23/2013 Hemodialysis patient ([facility] [address] 10/17/2016    on hemodialysis with [facility]    Hemorrhoids 1/16/2012    History of blood transfusion     Hyperlipidemia     Hyperphosphatemia 5/21/2016    Hypertension     Left renal artery stenosis ([facility] [address] 5/22/2014    Monoclonal (M) protein disease, multiple 'M' protein     Nicotine dependence 6/16/2014    Noncompliance     Pneumonia     Psychiatric problem     PTSD (post-traumatic stress disorder)     Pt vet from [facility]    Secondary hyperparathyroidism (of renal origin)     Sleep apnea      Past Surgical History:   Procedure Laterality Date    ABDOMINAL AORTIC ANEURYSM REPAIR      BRONCHOSCOPY N/A 10/6/2022    BRONCHOSCOPY performed by Mary Rojas MD at [address] [facility] Left 07/08/2016    EKG 12-LEAD  9/24/2015         HAND TENDON SURGERY Left 4/5/2019    LEFT THUMB FLEXOR TENDON REPAIR performed by Jaylen Abdul MD at [address] [facility] Right 10/10/2022    Right Thoracotomy & Decortication with Partial Lung Resection performed by Familia Fritz MD at [address] [place] [address] Left 07/08/2016    AV Fistula    VASCULAR SURGERY Right 1990    Surgery on Achilles tendon         MEDICATIONS     Current Facility-Administered Medications:     sodium chloride flush 0.9 % injection 5-40 mL, 5-40 mL, IntraVENous, 2 times per day, Meldon [place], PA-C    sodium chloride flush 0.9 % injection 5-40 mL, 5-40 mL, IntraVENous, PRN, Michael Ohlemacher, PA-C    0.9 % sodium chloride infusion, , IntraVENous, PRN, Michael Ohlemacher, PA-C    ondansetron (ZOFRAN-ODT) disintegrating tablet 4 mg, 4 mg, Oral, Q8H PRN **OR** ondansetron (ZOFRAN) injection 4 mg, 4 mg, IntraVENous, Q6H PRN, Michael Ohlemacher, PA-C    polyethylene glycol (GLYCOLAX) packet 17 g, 17 g, Oral, Daily PRN, Michael Ohlemacher, PA-C    acetaminophen (TYLENOL) tablet 650 mg, 650 mg, Oral, Q6H PRN **OR** acetaminophen (TYLENOL) suppository 650 mg, 650 mg, Rectal, Q6H PRN, Michale Pagan PA-C    nitroGLYCERIN 50 mg in dextrose 5% 250 mL infusion, 5-200 mcg/min, IntraVENous, Continuous, Forrest Garcia MD, Last Rate: 3 mL/hr at 02/04/23 1451, 10 mcg/min at 02/04/23 1451    aspirin EC tablet 81 mg, 81 mg, Oral, Daily, Kade Lim PA-C    atorvastatin (LIPITOR) tablet 40 mg, 40 mg, Oral, Nightly, Michael Pagan PA-C    [START ON 2/6/2023] calcitRIOL (ROCALTROL) capsule 2.25 mcg, 2.25 mcg, Oral, Once per day on Mon Wed Fri, Kade Lim PA-C    famotidine (PEPCID) tablet 20 mg, 20 mg, Oral, Daily, Kade Lim PA-C    [START ON 2/5/2023] ferrous sulfate (IRON 325) tablet 325 mg, 325 mg, Oral, Daily with breakfast, Michael Pagan PA-C    fluticasone (FLONASE) 50 MCG/ACT nasal spray 1 spray, 1 spray, Nasal, Daily, Michael Pagan PA-C    cetirizine (ZYRTEC) tablet 10 mg, 10 mg, Oral, Daily, Kade Lim PA-C    traZODone (DESYREL) tablet 50 mg, 50 mg, Oral, Nightly PRN, Kade Lim PA-C    [START ON 2/5/2023] verapamil (CALAN SR) extended release tablet 240 mg, 240 mg, Oral, Daily, Michael Pagan PA-C    sevelamer (RENVELA) tablet 1,600 mg, 1,600 mg, Oral, TID WC, Michael Pagan PA-C    sertraline (ZOLOFT) tablet 100 mg, 100 mg, Oral, Daily, Michael Pagan PA-C    vitamin D (CHOLECALCIFEROL) tablet 1,000 Units, 1,000 Units, Oral, BID, Kade Lim PA-C    heparin (porcine) injection 5,000 Units, 5,000 Units, SubCUTAneous, 3 times per day, Collene Dears, PA-C    doxazosin (CARDURA) tablet 4 mg, 4 mg, Oral, 2 times per day, Kade Lim PA-C    isosorbide mononitrate (IMDUR) extended release tablet 60 mg, 60 mg, Oral, BID, Michael Pagan PA-C    cloNIDine (CATAPRES) tablet 0.1 mg, 0.1 mg, Oral, TID, Michael Pagan PA-C    Facility-Administered Medications Ordered in Other Encounters:     HYDROmorphone (DILAUDID) injection 1 mg, 1 mg, IntraVENous, Once, Odell Centeno MD    lidocaine (LMX) 4 % cream, , Topical, Once, Odell Centeno MD    midazolam (VERSED) injection 1 mg, 1 mg, IntraVENous, Once, Odell Centeno MD      SOCIAL HISTORY     Social History     Social History Narrative    Not on file     Social History     Tobacco Use    Smoking status: Some Days     Packs/day: 0.25     Years: 20.00     Pack years: 5.00     Types: Cigarettes     Start date: 10/11/1985    Smokeless tobacco: Never    Tobacco comments:     occasional   Vaping Use    Vaping Use: Never used   Substance Use Topics    Alcohol use: No    Drug use: Not Currently     Comment: hx of         ALLERGIES     Allergies   Allergen Reactions    Humalog [Insulin Lispro] Other (See Comments)     Extreme sweating and intolerance. Patient absolutely refuses due to reaction. Insulin Regular Human Hives    Lisinopril Swelling     Swelling in lips          FAMILY HISTORY     Family History   Problem Relation Age of Onset    Cancer Mother     Other Brother         aneurysm          PHYSICAL EXAM     ED Triage Vitals [02/04/23 0551]   BP Temp Temp Source Heart Rate Resp SpO2 Height Weight   (!) 137/95 98 °F (36.7 °C) Oral (!) 106 22 90 % 6' 3\" (1.905 m) 219 lb 9.3 oz (99.6 kg)         Additional Vital Signs:  Vitals:    02/04/23 1529   BP: (!) 229/107   Pulse: 79   Resp: 16   Temp: 98.2 °F (36.8 °C)   SpO2: 98%       Physical Exam  Constitutional:       Appearance: He is ill-appearing. He is not diaphoretic. HENT:      Head: Normocephalic and atraumatic. Mouth/Throat:      Mouth: Mucous membranes are moist.      Pharynx: Oropharynx is clear. Cardiovascular:      Rate and Rhythm: Tachycardia present. Heart sounds: Murmur (holosystolic over R sternal border) heard. Pulmonary:      Effort: Tachypnea, accessory muscle usage and respiratory distress present. Breath sounds: Examination of the right-middle field reveals rales. Examination of the left-middle field reveals rales. Rales present. Chest:      Chest wall: No tenderness, crepitus or edema. Abdominal:      Palpations: Abdomen is soft. Tenderness: There is no abdominal tenderness. Musculoskeletal:      Cervical back: Normal range of motion and neck supple. Right lower leg: Edema present. Left lower leg: Edema present. Skin:     General: Skin is warm and dry. Capillary Refill: Capillary refill takes 2 to 3 seconds. Neurological:      Mental Status: He is alert and oriented to person, place, and time. DIFFERENTIALS   Initial Assessment: Given the patient's above chief complaint and findings on history and physical examination, I thought it was appropriate to consider the following emergency medical conditions:        Acute hypoxic respiratory failure 2/2 URI, cardiorenal syndrome, NURIS IV on CKD, hyperkalemia, CHF exacerbation      Although some of these diagnoses are unlikely they were considered in my medical decision making.       Chronic Conditions considered:   Patient Active Problem List   Diagnosis    FSGS (focal segmental glomerulosclerosis)    Diabetes mellitus type 2, controlled ([address]    Monoclonal (M) protein disease, multiple 'M' protein    Depression    PTSD (post-traumatic stress disorder)    Secondary renal hyperparathyroidism ([address]    Cocaine use    Substance induced mood disorder (HCC)    Renal artery stenosis (HCC) with uncontrollable hypertension    Chronic alcoholism (HCC)    Severe cocaine use disorder (HCC)    Uncontrolled hypertension    LVH (left ventricular hypertrophy) due to hypertensive disease    Acute on chronic diastolic CHF (congestive heart failure) (HCC)    REZA (obstructive sleep apnea)    Headache, unspecified headache type    FH: HTN (hypertension)    Hypertrophic cardiomyopathy ([address]    Diet-controlled diabetes mellitus ([address]    A-V fistula ([address]    Chronic thoracic aortic dissection    Hyperphosphatemia    Anemia in CKD (chronic kidney disease)    Vitamin D deficiency Thrombocytopenia (HCC)    Personality disorder (HCC)    Tobacco abuse    Metabolic acidosis    Pneumonia due to organism    Precordial pain    ESRD on hemodialysis (HCC)    Edema of upper extremity    ESRD (end stage renal disease) ([facility] [address]    Hyperglycemia    Cocaine abuse, continuous - reports spending $100 on cocaine \"every other day\"    Homelessness    Moderate episode of recurrent major depressive disorder ([facility] [address]    Pneumonia    Noncompliance of patient with renal dialysis ([facility] [address]    Anemia associated with stage 5 chronic renal failure (HCC)    Hypertensive emergency    Cocaine use disorder, severe, dependence ([facility] [address]    Alcohol use disorder, severe, dependence ([facility] [address]    Abdominal aortic aneurysm (AAA) without rupture    Atrial flutter with rapid ventricular response ([facility])    PVD (peripheral vascular disease) ([facility])    Dyslipidemia    Other fluid overload    Laceration of flexor muscle, fascia and tendon of left thumb at forearm level, initial encounter    Acute respiratory failure with hypoxia and hypercarbia (HCC)    Acute pulmonary edema (HCC)    Macrocytic anemia    History of aortic aneurysm repair    Medical non-compliance    Volume overload    Chest pain    Dyspnea    Pancytopenia (HCC)    Acute febrile illness    Hyponatremia    Hypermagnesemia    History of atrial flutter    History of alcohol abuse    History of cocaine abuse (HCC)    Mild tricuspid regurgitation    Uremia, acute    COVID-19 virus detected    COVID-19    Hypoxia    Trapped lung    Status post thoracotomy    Acute respiratory failure with hypoxia [facility])         ED RESULTS   Laboratory results:  Labs Reviewed   BASIC METABOLIC PANEL W/ REFLEX TO MG FOR LOW K - Abnormal; Notable for the following components:       Result Value    Potassium reflex Magnesium 5.9 (*)     CO2 21 (*)     Glucose 122 (*)     BUN 80 (*)     Creatinine 13.0 (*)     All other components within normal limits   BRAIN NATRIURETIC PEPTIDE - Abnormal; Notable for the following components:    Pro-BNP 02888.0 (*)     All other components within normal limits   CBC WITH AUTO DIFFERENTIAL - Abnormal; Notable for the following components:    RBC 3.90 (*)     Hemoglobin 11.8 (*)     Hematocrit 39.7 (*)     .8 (*)     MCHC 29.7 (*)     RDW-CV 15.2 (*)     RDW-SD 56.5 (*)     Platelets 518 (*)     Lymphocytes Absolute 0.6 (*)     Immature Grans (Abs) 0.08 (*)     All other components within normal limits   TROPONIN - Abnormal; Notable for the following components:    Troponin T 0.063 (*)     All other components within normal limits   MAGNESIUM - Abnormal; Notable for the following components:    Magnesium 2.8 (*)     All other components within normal limits   PHOSPHORUS - Abnormal; Notable for the following components:    Phosphorus 6.1 (*)     All other components within normal limits   GLOMERULAR FILTRATION RATE, ESTIMATED - Abnormal; Notable for the following components:    Est, Glom Filt Rate 4 (*)     All other components within normal limits   OSMOLALITY - Abnormal; Notable for the following components:    Osmolality Calc 306.6 (*)     All other components within normal limits   COVID-19 & INFLUENZA COMBO   ANION GAP   BLOOD GAS, ARTERIAL       Radiologic studies results:  XR CHEST (2 VW)   Final Result   1. Cardiomegaly with small pleural effusions and hazy bibasilar airspace    opacities which may indicate edema or pneumonia.       This document has been electronically signed by: Ivon Martin MD on    02/04/2023 06:37 AM          ED Medications administered this visit:   Medications   sodium chloride flush 0.9 % injection 5-40 mL (has no administration in time range)   sodium chloride flush 0.9 % injection 5-40 mL (has no administration in time range)   0.9 % sodium chloride infusion (has no administration in time range)   ondansetron (ZOFRAN-ODT) disintegrating tablet 4 mg (has no administration in time range)     Or   ondansetron (ZOFRAN) injection 4 mg (has no administration in time range)   polyethylene glycol (GLYCOLAX) packet 17 g (has no administration in time range)   acetaminophen (TYLENOL) tablet 650 mg (has no administration in time range)     Or   acetaminophen (TYLENOL) suppository 650 mg (has no administration in time range)   nitroGLYCERIN 50 mg in dextrose 5% 250 mL infusion (10 mcg/min IntraVENous Rate/Dose Change 2/4/23 8309)   aspirin EC tablet 81 mg (has no administration in time range)   atorvastatin (LIPITOR) tablet 40 mg (has no administration in time range)   calcitRIOL (ROCALTROL) capsule 2.25 mcg (has no administration in time range)   famotidine (PEPCID) tablet 20 mg (has no administration in time range)   ferrous sulfate (IRON 325) tablet 325 mg (has no administration in time range)   fluticasone (FLONASE) 50 MCG/ACT nasal spray 1 spray (has no administration in time range)   cetirizine (ZYRTEC) tablet 10 mg (has no administration in time range)   traZODone (DESYREL) tablet 50 mg (has no administration in time range)   verapamil (CALAN SR) extended release tablet 240 mg (has no administration in time range)   sevelamer (RENVELA) tablet 1,600 mg ( Oral Unheld by provider 2/4/23 1139)   sertraline (ZOLOFT) tablet 100 mg (has no administration in time range)   vitamin D (CHOLECALCIFEROL) tablet 1,000 Units (has no administration in time range)   heparin (porcine) injection 5,000 Units (has no administration in time range)   doxazosin (CARDURA) tablet 4 mg (has no administration in time range)   isosorbide mononitrate (IMDUR) extended release tablet 60 mg (has no administration in time range)   cloNIDine (CATAPRES) tablet 0.1 mg (has no administration in time range)   furosemide (LASIX) injection 80 mg (80 mg IntraVENous Given 2/4/23 6699)   calcium gluconate 1000 mg in sodium chloride 100 mL (1,000 mg IntraVENous New Bag 2/4/23 5581)   sodium bicarbonate 8.4 % injection 50 mEq (50 mEq IntraVENous Given 2/4/23 4215)         [facility]     ED Course as of 02/04/23 1608   Sat Feb 04, 2023   0639 Peaked T waves on EKG. Calcium gluconate ordered  [CW]   0754 SBP >200, ordered nitro drip targeting 160-170 SBP. Patient is asymptomatic at this time. [CW]      ED Course User Index  [CW] Jerad Yang MD     Available laboratory and imaging results were independently reviewed and clinically correlated. Decision Rules/Clinical Scores utilized:  CURB 65: 2. Code Status:  Not addressed during this ED visit    Social determinants of health impacting treatment or disposition: Poor compliance, active illicit drug use    Medical Commodities impacting treatment or disposition:     Past Medical History:   Diagnosis Date    AAA (abdominal aortic aneurysm)     NURIS (acute kidney injury) ([facility] [address] 9/24/2015    Anemia associated with chronic renal failure     Arthritis     stated in hands    Cocaine abuse ([facility] [address] 5/10/2014    Depression     Diabetes mellitus ([facility] [address]     pt states he no longer has diabetes he has lost alot of davion. FSGS (focal segmental glomerulosclerosis) 5/23/2013    Hemodialysis patient ([facility] [address] 10/17/2016    on hemodialysis with [facility] 1/16/2012    History of blood transfusion     Hyperlipidemia     Hyperphosphatemia 5/21/2016    Hypertension     Left renal artery stenosis ([facility] [address] 5/22/2014    Monoclonal (M) protein disease, multiple 'M' protein     Nicotine dependence 6/16/2014    Noncompliance     Pneumonia     Psychiatric problem     PTSD (post-traumatic stress disorder)     Pt vet from [facility]    Secondary hyperparathyroidism (of renal origin)     Sleep apnea        Consultants: Nephrology, Dr. Jethro Munoz    Final Assessment and Plan:   61-year-old male with a past medical history of hypertension, ESRD, hyperlipidemia, presenting with shortness of breath that started a day and a half ago. He has missed 2 dialysis appointments, and appears fluid overloaded on exam. Peaked T waves on EKG.    Lasix and Calcium gluconate given, also ordered continuous albuterol and 50 mEq of bicarb. His potassium was 5.9, however, his creatinine is 13. Will need urgent dialysis. Admitted to med/surg        MEDICATION CHANGES     DISCHARGE MEDICATIONS:  Current Discharge Medication List               FINAL DISPOSITION     Final diagnoses:   Acute respiratory failure with hypoxia ([address]   Acute pulmonary edema (HCC)   Hyperkalemia     Condition:  stable  Dispo: Admit to med/surg floor    PATIENT REFERRED TO:  No follow-up provider specified. The results of pertinent diagnostic studies and exam findings were discussed. The patients provisional diagnosis and plan of care were discussed with the patient  who expressed understanding. Critical Care Time   CRITICAL CARE:  none    PROCEDURES: (None if blank)  Procedures:   MDM      This transcription was electronically signed. Parts of this transcriptions may have been dictated by use of voice recognition software and electronically transcribed, and parts may have been transcribed with the assistance of an ED scribe. The transcription may contain errors not detected in proofreading.     Electronically Signed: Mukul Bearden MD, 02/04/23, 4:08 Amber Cha MD  Resident  02/04/23 3535

## 2023-02-05 LAB
ANION GAP SERPL CALC-SCNC: 12 MEQ/L (ref 8–16)
BASOPHILS ABSOLUTE: 0 THOU/MM3 (ref 0–0.1)
BASOPHILS NFR BLD AUTO: 0.5 %
BUN SERPL-MCNC: 45 MG/DL (ref 7–22)
CALCIUM SERPL-MCNC: 8.4 MG/DL (ref 8.5–10.5)
CHLORIDE SERPL-SCNC: 101 MEQ/L (ref 98–111)
CO2 SERPL-SCNC: 24 MEQ/L (ref 23–33)
CREAT SERPL-MCNC: 8.5 MG/DL (ref 0.4–1.2)
DEPRECATED RDW RBC AUTO: 55.6 FL (ref 35–45)
EKG ATRIAL RATE: 108 BPM
EKG ATRIAL RATE: 96 BPM
EKG P AXIS: 51 DEGREES
EKG P AXIS: 57 DEGREES
EKG P-R INTERVAL: 196 MS
EKG P-R INTERVAL: 206 MS
EKG Q-T INTERVAL: 354 MS
EKG Q-T INTERVAL: 380 MS
EKG QRS DURATION: 104 MS
EKG QRS DURATION: 106 MS
EKG QTC CALCULATION (BAZETT): 474 MS
EKG QTC CALCULATION (BAZETT): 480 MS
EKG R AXIS: -38 DEGREES
EKG R AXIS: -41 DEGREES
EKG T AXIS: 104 DEGREES
EKG T AXIS: 90 DEGREES
EKG VENTRICULAR RATE: 108 BPM
EKG VENTRICULAR RATE: 96 BPM
EOSINOPHIL NFR BLD AUTO: 3.2 %
EOSINOPHILS ABSOLUTE: 0.1 THOU/MM3 (ref 0–0.4)
ERYTHROCYTE [DISTWIDTH] IN BLOOD BY AUTOMATED COUNT: 15 % (ref 11.5–14.5)
GFR SERPL CREATININE-BSD FRML MDRD: 7 ML/MIN/1.73M2
GLUCOSE SERPL-MCNC: 135 MG/DL (ref 70–108)
HCT VFR BLD AUTO: 35.8 % (ref 42–52)
HGB BLD-MCNC: 10.4 GM/DL (ref 14–18)
IMM GRANULOCYTES # BLD AUTO: 0.02 THOU/MM3 (ref 0–0.07)
IMM GRANULOCYTES NFR BLD AUTO: 0.5 %
LYMPHOCYTES ABSOLUTE: 0.4 THOU/MM3 (ref 1–4.8)
LYMPHOCYTES NFR BLD AUTO: 9.8 %
MAGNESIUM SERPL-MCNC: 2.4 MG/DL (ref 1.6–2.4)
MCH RBC QN AUTO: 29.5 PG (ref 26–33)
MCHC RBC AUTO-ENTMCNC: 29.1 GM/DL (ref 32.2–35.5)
MCV RBC AUTO: 101.4 FL (ref 80–94)
MONOCYTES ABSOLUTE: 0.3 THOU/MM3 (ref 0.4–1.3)
MONOCYTES NFR BLD AUTO: 8.4 %
NEUTROPHILS NFR BLD AUTO: 77.6 %
NRBC BLD AUTO-RTO: 0 /100 WBC
PHOSPHATE SERPL-MCNC: 4.4 MG/DL (ref 2.4–4.7)
PLATELET # BLD AUTO: 76 THOU/MM3 (ref 130–400)
PLATELET BLD QL SMEAR: ABNORMAL
PMV BLD AUTO: 11.1 FL (ref 9.4–12.4)
POTASSIUM SERPL-SCNC: 5.1 MEQ/L (ref 3.5–5.2)
RBC # BLD AUTO: 3.53 MILL/MM3 (ref 4.7–6.1)
SCAN OF BLOOD SMEAR: NORMAL
SEGMENTED NEUTROPHILS ABSOLUTE COUNT: 3.2 THOU/MM3 (ref 1.8–7.7)
SODIUM SERPL-SCNC: 137 MEQ/L (ref 135–145)
WBC # BLD AUTO: 4.1 THOU/MM3 (ref 4.8–10.8)

## 2023-02-05 PROCEDURE — 93010 ELECTROCARDIOGRAM REPORT: CPT | Performed by: INTERNAL MEDICINE

## 2023-02-05 PROCEDURE — 2060000000 HC ICU INTERMEDIATE R&B

## 2023-02-05 PROCEDURE — 36415 COLL VENOUS BLD VENIPUNCTURE: CPT

## 2023-02-05 PROCEDURE — 93005 ELECTROCARDIOGRAM TRACING: CPT

## 2023-02-05 PROCEDURE — 83735 ASSAY OF MAGNESIUM: CPT

## 2023-02-05 PROCEDURE — 6370000000 HC RX 637 (ALT 250 FOR IP)

## 2023-02-05 PROCEDURE — 99231 SBSQ HOSP IP/OBS SF/LOW 25: CPT | Performed by: INTERNAL MEDICINE

## 2023-02-05 PROCEDURE — 6360000002 HC RX W HCPCS: Performed by: PHYSICIAN ASSISTANT

## 2023-02-05 PROCEDURE — 6370000000 HC RX 637 (ALT 250 FOR IP): Performed by: PHYSICIAN ASSISTANT

## 2023-02-05 PROCEDURE — 99232 SBSQ HOSP IP/OBS MODERATE 35: CPT | Performed by: INTERNAL MEDICINE

## 2023-02-05 PROCEDURE — 84100 ASSAY OF PHOSPHORUS: CPT

## 2023-02-05 PROCEDURE — 85025 COMPLETE CBC W/AUTO DIFF WBC: CPT

## 2023-02-05 PROCEDURE — 2580000003 HC RX 258: Performed by: PHYSICIAN ASSISTANT

## 2023-02-05 PROCEDURE — 80048 BASIC METABOLIC PNL TOTAL CA: CPT

## 2023-02-05 RX ORDER — HYDRALAZINE HYDROCHLORIDE 20 MG/ML
10 INJECTION INTRAMUSCULAR; INTRAVENOUS EVERY 8 HOURS PRN
Status: DISCONTINUED | OUTPATIENT
Start: 2023-02-05 | End: 2023-02-07 | Stop reason: HOSPADM

## 2023-02-05 RX ORDER — GUAIFENESIN/DEXTROMETHORPHAN 100-10MG/5
5 SYRUP ORAL EVERY 4 HOURS PRN
Status: DISCONTINUED | OUTPATIENT
Start: 2023-02-05 | End: 2023-02-07 | Stop reason: HOSPADM

## 2023-02-05 RX ORDER — ISOSORBIDE MONONITRATE 60 MG/1
120 TABLET, EXTENDED RELEASE ORAL 2 TIMES DAILY
Status: DISCONTINUED | OUTPATIENT
Start: 2023-02-05 | End: 2023-02-07 | Stop reason: HOSPADM

## 2023-02-05 RX ADMIN — GUAIFENESIN AND DEXTROMETHORPHAN 5 ML: 100; 10 SYRUP ORAL at 03:41

## 2023-02-05 RX ADMIN — ATORVASTATIN CALCIUM 40 MG: 40 TABLET, FILM COATED ORAL at 21:37

## 2023-02-05 RX ADMIN — HEPARIN SODIUM 5000 UNITS: 5000 INJECTION INTRAVENOUS; SUBCUTANEOUS at 21:37

## 2023-02-05 RX ADMIN — HEPARIN SODIUM 5000 UNITS: 5000 INJECTION INTRAVENOUS; SUBCUTANEOUS at 06:01

## 2023-02-05 RX ADMIN — SEVELAMER CARBONATE 1600 MG: 800 TABLET, FILM COATED ORAL at 08:00

## 2023-02-05 RX ADMIN — ISOSORBIDE MONONITRATE 120 MG: 60 TABLET, EXTENDED RELEASE ORAL at 21:37

## 2023-02-05 RX ADMIN — SEVELAMER CARBONATE 1600 MG: 800 TABLET, FILM COATED ORAL at 15:00

## 2023-02-05 RX ADMIN — ISOSORBIDE MONONITRATE 60 MG: 60 TABLET, EXTENDED RELEASE ORAL at 08:01

## 2023-02-05 RX ADMIN — GUAIFENESIN AND DEXTROMETHORPHAN 5 ML: 100; 10 SYRUP ORAL at 11:32

## 2023-02-05 RX ADMIN — GUAIFENESIN AND DEXTROMETHORPHAN 5 ML: 100; 10 SYRUP ORAL at 23:01

## 2023-02-05 RX ADMIN — ASPIRIN 81 MG: 81 TABLET, COATED ORAL at 08:00

## 2023-02-05 RX ADMIN — FLUTICASONE PROPIONATE 1 SPRAY: 50 SPRAY, METERED NASAL at 08:02

## 2023-02-05 RX ADMIN — CLONIDINE HYDROCHLORIDE 0.1 MG: 0.1 TABLET ORAL at 14:57

## 2023-02-05 RX ADMIN — DOXAZOSIN 4 MG: 4 TABLET ORAL at 08:01

## 2023-02-05 RX ADMIN — CLONIDINE HYDROCHLORIDE 0.1 MG: 0.1 TABLET ORAL at 08:01

## 2023-02-05 RX ADMIN — FAMOTIDINE 20 MG: 20 TABLET, FILM COATED ORAL at 08:00

## 2023-02-05 RX ADMIN — SERTRALINE 100 MG: 100 TABLET, FILM COATED ORAL at 08:01

## 2023-02-05 RX ADMIN — HEPARIN SODIUM 5000 UNITS: 5000 INJECTION INTRAVENOUS; SUBCUTANEOUS at 14:57

## 2023-02-05 RX ADMIN — FERROUS SULFATE TAB 325 MG (65 MG ELEMENTAL FE) 325 MG: 325 (65 FE) TAB at 08:01

## 2023-02-05 RX ADMIN — CLONIDINE HYDROCHLORIDE 0.1 MG: 0.1 TABLET ORAL at 21:37

## 2023-02-05 RX ADMIN — CETIRIZINE HYDROCHLORIDE 10 MG: 10 TABLET, FILM COATED ORAL at 08:01

## 2023-02-05 RX ADMIN — VERAPAMIL HYDROCHLORIDE 240 MG: 240 TABLET, FILM COATED, EXTENDED RELEASE ORAL at 08:01

## 2023-02-05 RX ADMIN — DOXAZOSIN 4 MG: 4 TABLET ORAL at 21:37

## 2023-02-05 RX ADMIN — Medication 1000 UNITS: at 21:37

## 2023-02-05 RX ADMIN — Medication 1000 UNITS: at 08:01

## 2023-02-05 RX ADMIN — GUAIFENESIN AND DEXTROMETHORPHAN 5 ML: 100; 10 SYRUP ORAL at 19:06

## 2023-02-05 RX ADMIN — SODIUM CHLORIDE, PRESERVATIVE FREE 10 ML: 5 INJECTION INTRAVENOUS at 21:37

## 2023-02-05 RX ADMIN — SEVELAMER CARBONATE 1600 MG: 800 TABLET, FILM COATED ORAL at 11:32

## 2023-02-05 ASSESSMENT — PAIN SCALES - GENERAL
PAINLEVEL_OUTOF10: 0

## 2023-02-05 NOTE — PROGRESS NOTES
[facility]   [facility]   [address]   [place] [address]   [phone]   Nephrology Hospital progress note       2/5/2023, 10:52 AM        Pt Name:    Юлия Davidson  MRN:     [id number]     YOB: 1965  Admit Date:    2/4/2023  5:49 AM  Primary Care Physician:  Ruby Stanley APRN - CNP   Primary Nephrologist:          Dr. Coby Mckeon number  [id number]    ISOLATION: none     Admitting Chief Complaint:   Shortness of breath     History of Chief Complaint:  The patient is a 66y.o. year old black male who receives maintenance hemodialysis at [facility] under the care of Dr. Saul Randhawa to treat ESRD from hypertension. He explained that he had two funerals to go to and missed dialysis on last Thursday. He does not make urine. He tried to limit his fluid intake and tried to hold off until dialysis but became acutely short of breath and was taken by ambulance to ER where he was found to have hypoxia. Dialysis staff explained that he frequently misses dialysis. He denied chest pain but his lungs feel tight. He is coughing up creamy, thick, yello/green sputum. He has had scant hemoptysis and his blood pressure in ER was quite high. He we was started on IV nitroglycerin drip. Nephrology is treating:   ESRD requiring hemodialysis. Subjective: The patient was asleep in bed. He woke up easily. He feels improved and denies N/V/C/D/SOB/CP. He feels well enough for discharge 02/06/2023 after dialysis.     Lab Results   Component Value Date/Time    LABGLOM 7 02/05/2023 04:19 AM    LABGLOM 4 02/04/2023 06:05 AM    LABGLOM 19 10/20/2022 11:58 AM    LABGLOM 11 10/15/2022 03:46 AM    LABGLOM 7 10/14/2022 03:35 AM    LABGLOM 10 10/13/2022 06:28 AM          Baseline eGFR    Admit eGFR    Current eGFR          Objective:    Diet: renal    VITALS:  BP (!) 204/96 Comment: provider notified  Pulse 99   Temp 98.6 °F (37 °C) (Oral)   Resp 17   Ht 6' 3\" (1.905 m)   Wt 217 lb 14.4 oz (98.8 kg)   SpO2 98%   BMI 27.24 kg/m²   24HR INTAKE/OUTPUT:    Intake/Output Summary (Last 24 hours) at 2/5/2023 1052  Last data filed at 2/4/2023 2000  Gross per 24 hour   Intake 817.82 ml   Output 5400 ml   Net -4582.18 ml     Admission weight: 219 lb 9.3 oz (99.6 kg)  Wt Readings from Last 3 Encounters:   02/05/23 217 lb 14.4 oz (98.8 kg)   12/14/22 229 lb (103.9 kg)   10/20/22 222 lb (100.7 kg)     Body mass index is 27.24 kg/m². Physical examination    General:  Alert and cooperative with exam  HEENT:  Head: Normocephalic, no lesions, without obvious abnormality. Neck:   no adenopathy, no carotid bruit, no JVD, supple, symmetrical, trachea midline, and thyroid not enlarged, symmetric, no tenderness/mass/nodules  Lungs:  clear to auscultation bilaterally  Heart:  regular rate and rhythm, S1, S2 normal, no murmur, click, rub or gallop  Abdomen:  soft, non-tender; bowel sounds normal; no masses,  no organomegaly  Extremities:  extremities normal, atraumatic, no cyanosis or edema  Neurologic:  Mental status: Alert, oriented, thought content appropriate  Psy:                 No evidence of depression. Mood is stable. Skin:                Warm and dry. No lesions or rashes noted. Diaphoresis present  Muscles:         Hand  and leg strength are equal and strong bilaterally. Lab Data  CBC:   Recent Labs     02/04/23  0605 02/05/23  0419   WBC 7.3 4.1*   HGB 11.8* 10.4*   * 76*     BMP:  Recent Labs     02/04/23  0605 02/05/23  0419    137   K 5.9* 5.1    101   CO2 21* 24   BUN 80* 45*   CREATININE 13.0* 8.5*   GLUCOSE 122* 135*   CALCIUM 8.6 8.4*   MG 2.8* 2.4   PHOS 6.1* 4.4     TSH: No results for input(s): TSH in the last 72 hours. HgBa1c: No results for input(s): LABA1C in the last 72 hours. Hepatic: No results for input(s): LABALBU, AST, ALT, ALB, BILITOT, ALKPHOS in the last 72 hours. Troponin: No results for input(s): TROPONINI in the last 72 hours.   BNP: No results for input(s): BNP in the last 72 hours. Lipids: No results for input(s): CHOL, HDL in the last 72 hours. Invalid input(s): LDLCALCU  INR: No results for input(s): INR in the last 72 hours. Assessment:    ESRD  HTN-uncontrolled  COPD  Iron deficient anemia. Fluid overload from missed hemodialysis. Hypoxic respiratory distress in ER resolved after dialysis. Plan: It is OK with nephrology to discharge the patient when ready. Brandon Fisher, DO  [facility].

## 2023-02-05 NOTE — PLAN OF CARE
Problem: Discharge Planning  Goal: Discharge to home or other facility with appropriate resources  2/5/2023 0956 by Marko Baldwin RN  Outcome: Progressing  2/4/2023 2311 by Jovi Mendoza RN  Outcome: Progressing  Flowsheets (Taken 2/4/2023 2000)  Discharge to home or other facility with appropriate resources: Identify barriers to discharge with patient and caregiver

## 2023-02-05 NOTE — PLAN OF CARE
Problem: Discharge Planning  Goal: Discharge to home or other facility with appropriate resources  Outcome: Progressing  Flowsheets (Taken 2/4/2023 2000)  Discharge to home or other facility with appropriate resources: Identify barriers to discharge with patient and caregiver     Problem: Safety - Adult  Goal: Free from fall injury  Outcome: Progressing     Problem: ABCDS Injury Assessment  Goal: Absence of physical injury  Outcome: Progressing     Problem: Pain  Goal: Verbalizes/displays adequate comfort level or baseline comfort level  Outcome: Progressing  Flowsheets (Taken 2/4/2023 2000)  Verbalizes/displays adequate comfort level or baseline comfort level: Encourage patient to monitor pain and request assistance     Problem: Chronic Conditions and Co-morbidities  Goal: Patient's chronic conditions and co-morbidity symptoms are monitored and maintained or improved  Outcome: Progressing  Flowsheets (Taken 2/4/2023 2000)  Care Plan - Patient's Chronic Conditions and Co-Morbidity Symptoms are Monitored and Maintained or Improved: Monitor and assess patient's chronic conditions and comorbid symptoms for stability, deterioration, or improvement     Problem: Respiratory - Adult  Goal: Achieves optimal ventilation and oxygenation  Outcome: Progressing  Flowsheets (Taken 2/4/2023 2000)  Achieves optimal ventilation and oxygenation: Assess for changes in respiratory status     Problem: Cardiovascular - Adult  Goal: Maintains optimal cardiac output and hemodynamic stability  Outcome: Progressing  Flowsheets (Taken 2/4/2023 2000)  Maintains optimal cardiac output and hemodynamic stability: Monitor blood pressure and heart rate     Problem: Metabolic/Fluid and Electrolytes - Adult  Goal: Electrolytes maintained within normal limits  Outcome: Progressing  Flowsheets (Taken 2/4/2023 2000)  Electrolytes maintained within normal limits: Monitor labs and assess patient for signs and symptoms of electrolyte imbalances  Goal: Hemodynamic stability and optimal renal function maintained  Outcome: Progressing  Flowsheets (Taken 2/4/2023 2000)  Hemodynamic stability and optimal renal function maintained: Monitor labs and assess for signs and symptoms of volume excess or deficit     Problem: Hematologic - Adult  Goal: Maintains hematologic stability  Outcome: Progressing  Flowsheets (Taken 2/4/2023 2000)  Maintains hematologic stability: Assess for signs and symptoms of bleeding or hemorrhage

## 2023-02-05 NOTE — PROGRESS NOTES
Internal Medicine Resident Progress Note    Patient:  Ann Saul    YOB: 1965  Unit/Bed:4K-09/009-A  MRN: [id number]    Acct: [de-identified]   PCP: WADE Gaston CNP    Date of Admission: 2/4/2023      Assessment/Plan:    Hypertensive Urgency / HTN, uncontrolled: likely secondary to missed HD. Reports compliance w/ OP med regimen. Notes sig elevated BP at home w/ labile BP. Continued home Clonidine 0.1mg TID, Verapamil 240mg daily, Doxazosin 4mg BID. Increased Imdur to 120mg BID. Prn Hydralazine if needed. Monitor. Acute HFpEF exacerbation: TTE (9/2022) EF 60% w/ mod LVOT obstruction and dilated LA/RA and sig LV hypertrophy. Initial BNP [id number]. CXR (2/04) w/ cardiopulm congestion. HD per nephro. Strict I/Os, daily weight, 2L fluid restriction. ESRD on HD TThS: follows w/ Dr. Fidel Dodson. Missed HD Thursday. S/p HD 2/04 w/ improvements. Discussed w/ Nephrology; they are okay w/ discharge when medically stable. Cont OP HD as scheduled. Hemoptysis, improved: likely viral bronchitis as afebrile and no leukocytosis. Will defer abx at this time. Maintain adequate hydration and diet. Tylenol prn. Monitor. Chronic Macrocytic Anemia: likely secondary to ESRD. No signs of occult bleed. Trend CBC; transfuse for Hgb<7. Allergic Rhinitis: continued home Flonase. Continued Cetrizine 10mg daily in place of home Hydroxyzine TID. HLD: continued ome statin  GERD: continued home Pepcid  Depression / PTSD: continued home Zoloft  Hx REZA: no active therapies. Controlled w/ weight loss  Hx TAA: s/p stent placement  Acute on Chronic Hypoxic Respiratory Failure, resolved: likely multifactorial secondary to pulm edema vs viral bronchitis. Baseline 4L NC, required HFNC on admit. Now back to baseline. HD as scheduled. IS/Acapella, pulm hygiene. Hyperkalemia, resolved: likely secondary to ESRD w/ missed HD. S/p calcium, bicarb, lasix in ED. S/p HD 2/04 w/ improvement.       Expected discharge date:  < 2 days    Disposition:   [x] Home  [] TCU  [] Rehab  [] Psych  [] SNF  [] [facility]  [] Other-    ===================================================================      Chief Complaint: SOB    Hospital Course: Patient is a 66yo M w/ PMH HFpEF, HTN, HLD, TAA (s/p stenting), Chronic Resp Failure (baseline 4L NC), ESRD on HD TThS, GERD, Depression, TPSD, REZA, allergic rhinits, cocaine abuse who presented to [facility] ED 2023 for SOB. Patient missed HD Thursday due to attending a . Endorsed fevers, chills, productive cough requiring HFNC in ED. Does note that fevers/chills have broken a few days ago. LA and WBC WNL. BNP elevated. CXR revealed cardiopulm congestion and Nephrology consulted for HD. Reports medical compliance but BP runs high at home and are very labile. Admitted to step-down for further management. Subjective (past 24 hours): Today, patient feels better and on 4L NC. His blood pressure is still persistently elevated but patient denied any new symptoms. Endorsed continued coughing but not worsening. Will optimize BP medications. ROS: reviewed complete ROS unchanged unless otherwise stated in hospital course/subjective portion.        Medications:  Reviewed    Infusion Medications    sodium chloride      nitroGLYCERIN Stopped (23 1610)     Scheduled Medications    sodium chloride flush  5-40 mL IntraVENous 2 times per day    aspirin  81 mg Oral Daily    atorvastatin  40 mg Oral Nightly    [START ON 2023] calcitRIOL  2.25 mcg Oral Once per day on     famotidine  20 mg Oral Daily    ferrous sulfate  325 mg Oral Daily with breakfast    fluticasone  1 spray Nasal Daily    cetirizine  10 mg Oral Daily    verapamil  240 mg Oral Daily    sevelamer  1,600 mg Oral TID WC    sertraline  100 mg Oral Daily    Vitamin D  1,000 Units Oral BID    heparin (porcine)  5,000 Units SubCUTAneous 3 times per day    doxazosin  4 mg Oral 2 times per day    isosorbide mononitrate  60 mg Oral BID    cloNIDine  0.1 mg Oral TID     PRN Meds: guaiFENesin-dextromethorphan, hydrALAZINE, sodium chloride flush, sodium chloride, ondansetron **OR** ondansetron, polyethylene glycol, acetaminophen **OR** acetaminophen, traZODone        Intake/Output Summary (Last 24 hours) at 2/5/2023 1249  Last data filed at 2/4/2023 2000  Gross per 24 hour   Intake 817.82 ml   Output 5400 ml   Net -4582.18 ml       Exam:  BP (!) 152/86   Pulse 85   Temp 98.4 °F (36.9 °C) (Oral)   Resp 16   Ht 6' 3\" (1.905 m)   Wt 217 lb 14.4 oz (98.8 kg)   SpO2 97%   BMI 27.24 kg/m²     General: No distress, appears stated age. Eyes:  PERRL. Conjunctivae/corneas clear. HENT: Head normal appearing. Nares normal. Oral mucosa moist.  Hearing intact. Neck: Supple, with full range of motion. Trachea midline. No gross JVD appreciated. Respiratory:  Normal effort. Tight right lung sounds. Cardiovascular: Normal rate, regular rhythm with normal W4/B5. 4/6 systolic murmur best auscultated at mitral post, no rubs or gallops   No lower extremity edema. LUE fistula noted. Abdomen: Soft, non-tender, non-distended with normal bowel sounds. Musculoskeletal: No joint swelling or tenderness. Normal tone. No abnormal movements. Skin: Warm and dry. No rashes or lesions. Neurologic:  No focal sensory/motor deficits in the upper or lower extremities. Cranial nerves:  grossly non-focal 2-12. Psychiatric: Alert and oriented, normal insight and thought content. Capillary Refill: Brisk,< 3 seconds. Peripheral Pulses: +2 palpable, equal bilaterally. Labs:   Recent Labs     02/04/23  0605 02/05/23 0419   WBC 7.3 4.1*   HGB 11.8* 10.4*   HCT 39.7* 35.8*   * 76*     Recent Labs     02/04/23  0605 02/05/23 0419    137   K 5.9* 5.1    101   CO2 21* 24   BUN 80* 45*   CREATININE 13.0* 8.5*   CALCIUM 8.6 8.4*   PHOS 6.1* 4.4     No results for input(s): AST, ALT, BILIDIR, BILITOT, ALKPHOS in the last 72 hours.   No results for input(s): INR in the last 72 hours. Recent Labs     02/04/23  0605   TROPONINT 0.063*     No results for input(s): PROCAL in the last 72 hours. Lab Results   Component Value Date/Time    NITRU NEGATIVE 04/03/2018 07:45 PM    WBCUA 2-4 04/03/2018 07:45 PM    BACTERIA NONE 04/03/2018 07:45 PM    RBCUA 5-10 04/03/2018 07:45 PM    BLOODU SMALL 04/03/2018 07:45 PM    SPECGRAV 1.014 03/07/2018 09:10 PM    GLUCOSEU NEGATIVE 04/03/2018 07:45 PM       Radiology (48 hours):  XR CHEST (2 VW)    Result Date: 2/4/2023  1. Cardiomegaly with small pleural effusions and hazy bibasilar airspace opacities which may indicate edema or pneumonia. This document has been electronically signed by: Stephanie Machado MD on 02/04/2023 06:37 AM       DVT prophylaxis:    [] Lovenox  [] SCDs  [x] SQ Heparin  [] Encourage ambulation   [] Already on Anticoagulation       Diet: ADULT DIET; Regular; Low Sodium (2 gm); Low Potassium (Less than 3000 mg/day);  Low Phosphorus (Less than 1000 mg); 2000 ml  Code Status: Full Code  PT/OT: as tolerated  Tele: yes  IVF: as needed    Electronically signed by Lisy Root DO on 2/5/2023 at 12:49 PM    Case was discussed with Attending, Dr. Gabrielle Cheung MD

## 2023-02-06 LAB
ANION GAP SERPL CALC-SCNC: 14 MEQ/L (ref 8–16)
BUN SERPL-MCNC: 61 MG/DL (ref 7–22)
CALCIUM SERPL-MCNC: 8.3 MG/DL (ref 8.5–10.5)
CHLORIDE SERPL-SCNC: 101 MEQ/L (ref 98–111)
CO2 SERPL-SCNC: 23 MEQ/L (ref 23–33)
CREAT SERPL-MCNC: 10.8 MG/DL (ref 0.4–1.2)
DEPRECATED RDW RBC AUTO: 54.6 FL (ref 35–45)
ERYTHROCYTE [DISTWIDTH] IN BLOOD BY AUTOMATED COUNT: 14.6 % (ref 11.5–14.5)
GFR SERPL CREATININE-BSD FRML MDRD: 5 ML/MIN/1.73M2
GLUCOSE SERPL-MCNC: 83 MG/DL (ref 70–108)
HCT VFR BLD AUTO: 33.7 % (ref 42–52)
HGB BLD-MCNC: 10 GM/DL (ref 14–18)
MAGNESIUM SERPL-MCNC: 2.8 MG/DL (ref 1.6–2.4)
MCH RBC QN AUTO: 30 PG (ref 26–33)
MCHC RBC AUTO-ENTMCNC: 29.7 GM/DL (ref 32.2–35.5)
MCV RBC AUTO: 101.2 FL (ref 80–94)
PHOSPHATE SERPL-MCNC: 4.5 MG/DL (ref 2.4–4.7)
PLATELET # BLD AUTO: 77 THOU/MM3 (ref 130–400)
PMV BLD AUTO: 11.8 FL (ref 9.4–12.4)
POTASSIUM SERPL-SCNC: 5.4 MEQ/L (ref 3.5–5.2)
RBC # BLD AUTO: 3.33 MILL/MM3 (ref 4.7–6.1)
SODIUM SERPL-SCNC: 138 MEQ/L (ref 135–145)
WBC # BLD AUTO: 4 THOU/MM3 (ref 4.8–10.8)

## 2023-02-06 PROCEDURE — 84100 ASSAY OF PHOSPHORUS: CPT

## 2023-02-06 PROCEDURE — 6370000000 HC RX 637 (ALT 250 FOR IP): Performed by: PHYSICIAN ASSISTANT

## 2023-02-06 PROCEDURE — 6370000000 HC RX 637 (ALT 250 FOR IP)

## 2023-02-06 PROCEDURE — 6370000000 HC RX 637 (ALT 250 FOR IP): Performed by: INTERNAL MEDICINE

## 2023-02-06 PROCEDURE — 2060000000 HC ICU INTERMEDIATE R&B

## 2023-02-06 PROCEDURE — 99232 SBSQ HOSP IP/OBS MODERATE 35: CPT | Performed by: INTERNAL MEDICINE

## 2023-02-06 PROCEDURE — 6370000000 HC RX 637 (ALT 250 FOR IP): Performed by: STUDENT IN AN ORGANIZED HEALTH CARE EDUCATION/TRAINING PROGRAM

## 2023-02-06 PROCEDURE — 83735 ASSAY OF MAGNESIUM: CPT

## 2023-02-06 PROCEDURE — 80048 BASIC METABOLIC PNL TOTAL CA: CPT

## 2023-02-06 PROCEDURE — 85027 COMPLETE CBC AUTOMATED: CPT

## 2023-02-06 PROCEDURE — 36415 COLL VENOUS BLD VENIPUNCTURE: CPT

## 2023-02-06 PROCEDURE — 6360000002 HC RX W HCPCS: Performed by: PHYSICIAN ASSISTANT

## 2023-02-06 PROCEDURE — 6360000002 HC RX W HCPCS

## 2023-02-06 PROCEDURE — 2580000003 HC RX 258: Performed by: PHYSICIAN ASSISTANT

## 2023-02-06 RX ORDER — ISOSORBIDE MONONITRATE 60 MG/1
120 TABLET, EXTENDED RELEASE ORAL 2 TIMES DAILY
Qty: 30 TABLET | Refills: 3 | Status: CANCELLED
Start: 2023-02-06

## 2023-02-06 RX ORDER — LABETALOL 100 MG/1
50 TABLET, FILM COATED ORAL EVERY 12 HOURS SCHEDULED
Status: DISCONTINUED | OUTPATIENT
Start: 2023-02-06 | End: 2023-02-07 | Stop reason: HOSPADM

## 2023-02-06 RX ORDER — DOXAZOSIN MESYLATE 4 MG/1
8 TABLET ORAL EVERY 12 HOURS SCHEDULED
Status: DISCONTINUED | OUTPATIENT
Start: 2023-02-06 | End: 2023-02-07 | Stop reason: HOSPADM

## 2023-02-06 RX ORDER — CETIRIZINE HYDROCHLORIDE 10 MG/1
10 TABLET ORAL DAILY
Qty: 30 TABLET | Refills: 0 | Status: CANCELLED | OUTPATIENT
Start: 2023-02-07 | End: 2023-03-09

## 2023-02-06 RX ORDER — CLONIDINE HYDROCHLORIDE 0.2 MG/1
0.2 TABLET ORAL EVERY 8 HOURS
Status: DISCONTINUED | OUTPATIENT
Start: 2023-02-06 | End: 2023-02-07 | Stop reason: HOSPADM

## 2023-02-06 RX ORDER — DOXAZOSIN MESYLATE 4 MG/1
8 TABLET ORAL EVERY 12 HOURS SCHEDULED
Qty: 30 TABLET | Refills: 0 | Status: CANCELLED
Start: 2023-02-06

## 2023-02-06 RX ADMIN — FERROUS SULFATE TAB 325 MG (65 MG ELEMENTAL FE) 325 MG: 325 (65 FE) TAB at 07:54

## 2023-02-06 RX ADMIN — HYDRALAZINE HYDROCHLORIDE 10 MG: 20 INJECTION INTRAMUSCULAR; INTRAVENOUS at 01:59

## 2023-02-06 RX ADMIN — LABETALOL HYDROCHLORIDE 50 MG: 100 TABLET, FILM COATED ORAL at 11:33

## 2023-02-06 RX ADMIN — GUAIFENESIN AND DEXTROMETHORPHAN 5 ML: 100; 10 SYRUP ORAL at 03:13

## 2023-02-06 RX ADMIN — FLUTICASONE PROPIONATE 1 SPRAY: 50 SPRAY, METERED NASAL at 07:55

## 2023-02-06 RX ADMIN — ISOSORBIDE MONONITRATE 120 MG: 60 TABLET, EXTENDED RELEASE ORAL at 07:54

## 2023-02-06 RX ADMIN — ATORVASTATIN CALCIUM 40 MG: 40 TABLET, FILM COATED ORAL at 19:42

## 2023-02-06 RX ADMIN — CLONIDINE HYDROCHLORIDE 0.1 MG: 0.1 TABLET ORAL at 07:54

## 2023-02-06 RX ADMIN — Medication 1000 UNITS: at 19:42

## 2023-02-06 RX ADMIN — SEVELAMER CARBONATE 1600 MG: 800 TABLET, FILM COATED ORAL at 07:54

## 2023-02-06 RX ADMIN — HEPARIN SODIUM 5000 UNITS: 5000 INJECTION INTRAVENOUS; SUBCUTANEOUS at 06:17

## 2023-02-06 RX ADMIN — VERAPAMIL HYDROCHLORIDE 240 MG: 240 TABLET, FILM COATED, EXTENDED RELEASE ORAL at 07:54

## 2023-02-06 RX ADMIN — CALCITRIOL CAPSULES 0.25 MCG 2.25 MCG: 0.25 CAPSULE ORAL at 07:51

## 2023-02-06 RX ADMIN — ASPIRIN 81 MG: 81 TABLET, COATED ORAL at 07:51

## 2023-02-06 RX ADMIN — SODIUM CHLORIDE, PRESERVATIVE FREE 10 ML: 5 INJECTION INTRAVENOUS at 19:46

## 2023-02-06 RX ADMIN — SERTRALINE 100 MG: 100 TABLET, FILM COATED ORAL at 07:54

## 2023-02-06 RX ADMIN — SODIUM ZIRCONIUM CYCLOSILICATE 10 G: 10 POWDER, FOR SUSPENSION ORAL at 11:33

## 2023-02-06 RX ADMIN — HEPARIN SODIUM 5000 UNITS: 5000 INJECTION INTRAVENOUS; SUBCUTANEOUS at 22:15

## 2023-02-06 RX ADMIN — LABETALOL HYDROCHLORIDE 50 MG: 100 TABLET, FILM COATED ORAL at 20:32

## 2023-02-06 RX ADMIN — CLONIDINE HYDROCHLORIDE 0.2 MG: 0.2 TABLET ORAL at 22:11

## 2023-02-06 RX ADMIN — CETIRIZINE HYDROCHLORIDE 10 MG: 10 TABLET, FILM COATED ORAL at 07:54

## 2023-02-06 RX ADMIN — SODIUM CHLORIDE, PRESERVATIVE FREE 10 ML: 5 INJECTION INTRAVENOUS at 07:54

## 2023-02-06 RX ADMIN — DOXAZOSIN 8 MG: 4 TABLET ORAL at 07:54

## 2023-02-06 RX ADMIN — SEVELAMER CARBONATE 1600 MG: 800 TABLET, FILM COATED ORAL at 16:55

## 2023-02-06 RX ADMIN — GUAIFENESIN AND DEXTROMETHORPHAN 5 ML: 100; 10 SYRUP ORAL at 19:57

## 2023-02-06 RX ADMIN — DOXAZOSIN 8 MG: 4 TABLET ORAL at 20:32

## 2023-02-06 RX ADMIN — CLONIDINE HYDROCHLORIDE 0.2 MG: 0.2 TABLET ORAL at 14:33

## 2023-02-06 RX ADMIN — SEVELAMER CARBONATE 1600 MG: 800 TABLET, FILM COATED ORAL at 11:33

## 2023-02-06 RX ADMIN — FAMOTIDINE 20 MG: 20 TABLET, FILM COATED ORAL at 07:51

## 2023-02-06 RX ADMIN — HEPARIN SODIUM 5000 UNITS: 5000 INJECTION INTRAVENOUS; SUBCUTANEOUS at 14:33

## 2023-02-06 RX ADMIN — Medication 1000 UNITS: at 07:54

## 2023-02-06 RX ADMIN — ISOSORBIDE MONONITRATE 120 MG: 60 TABLET, EXTENDED RELEASE ORAL at 19:42

## 2023-02-06 ASSESSMENT — PAIN SCALES - GENERAL
PAINLEVEL_OUTOF10: 0

## 2023-02-06 NOTE — PLAN OF CARE
Problem: Safety - Adult  Goal: Free from fall injury  Outcome: Progressing     Problem: ABCDS Injury Assessment  Goal: Absence of physical injury  Outcome: Progressing     Problem: Pain  Goal: Verbalizes/displays adequate comfort level or baseline comfort level  Outcome: Progressing  Flowsheets (Taken 2/5/2023 1945)  Verbalizes/displays adequate comfort level or baseline comfort level:   Encourage patient to monitor pain and request assistance   Assess pain using appropriate pain scale   Administer analgesics based on type and severity of pain and evaluate response   Implement non-pharmacological measures as appropriate and evaluate response

## 2023-02-06 NOTE — PROGRESS NOTES
Internal Medicine Resident Progress Note    Patient:  Amanda Quiroga    YOB: 1965  Unit/Bed:4K-09/009-A  MRN: [id number]    Acct: [de-identified]   PCP: WADE Rendon CNP    Date of Admission: 2/4/2023      Assessment/Plan:    Hypertensive Urgency / HTN, uncontrolled: likely secondary to missed HD. Reports compliance w/ OP med regimen. Notes sig elevated BP at home w/ labile BP. Continued home Clonidine 0.1mg TID, Verapamil 240mg daily. Increased Imdur to 120mg BID and Doxazosin 8mg BID. Added Labetalol 50mg BID. Prn Hydralazine if needed. Monitor. Acute HFpEF exacerbation: TTE (9/2022) EF 60% w/ mod LVOT obstruction and dilated LA/RA and sig LV hypertrophy. Initial BNP [id number]. CXR (2/04) w/ cardiopulm congestion. HD per nephro. Strict I/Os, daily weight, 2L fluid restriction. ESRD on HD TThS: follows w/ Dr. Ricks Signs. Missed HD Thursday. S/p HD 2/04 w/ improvements. Discussed w/ Nephrology; they are okay w/ discharge when medically stable. Cont OP HD as scheduled. Hemoptysis, improved: likely viral bronchitis as afebrile and no leukocytosis. Will defer abx at this time. Maintain adequate hydration and diet. Tylenol prn. Monitor. Chronic Macrocytic Anemia: likely secondary to ESRD. No signs of occult bleed. Trend CBC; transfuse for Hgb<7. Allergic Rhinitis: continued home Flonase. Continued Cetrizine 10mg daily in place of home Hydroxyzine TID. HLD: continued ome statin  GERD: continued home Pepcid  Depression / PTSD: continued home Zoloft  Hx REZA: no active therapies. Controlled w/ weight loss  Hx TAA: s/p stent placement  Acute on Chronic Hypoxic Respiratory Failure, resolved: likely multifactorial secondary to pulm edema vs viral bronchitis. Baseline 4L NC, required HFNC on admit. Now back to baseline. HD as scheduled. IS/Acapella, pulm hygiene. Non-cardiogenic Pulm Edema Secondary to ESRD and missed HD (POA), resolved: Discussed w/ Nephrology; HD as scheduled. BP management.  Breathing has improved at this time. Continue to monitor. Hyperkalemia, resolved: likely secondary to ESRD w/ missed HD. S/p calcium, bicarb, lasix in ED. S/p HD  w/ improvement. Expected discharge date:  < 2 days    Disposition:   [x] Home  [] TCU  [] Rehab  [] Psych  [] SNF  [] [facility]  [] Other-    ===================================================================      Chief Complaint: SOB    Hospital Course: Patient is a 64yo M w/ PMH HFpEF, HTN, HLD, TAA (s/p stenting), Chronic Resp Failure (baseline 4L NC), ESRD on HD TThS, GERD, Depression, TPSD, REZA, allergic rhinits, cocaine abuse who presented to [facility] ED 2023 for SOB. Patient missed HD Thursday due to attending a . Endorsed fevers, chills, productive cough requiring HFNC in ED. Does note that fevers/chills have broken a few days ago. LA and WBC WNL. BNP elevated. CXR revealed cardiopulm congestion and Nephrology consulted for HD. Reports medical compliance but BP runs high at home and are very labile. Admitted to step-down for further management. Breathing improved, weaned down to baseline 4L. Subjective (past 24 hours): Today, patient feels good and on 4L NC. His blood pressure is still persistently elevated but patient denied any new symptoms. Endorsed continued coughing but not worsening. Will further optimize BP medications, continue to monitor BP. HD tomorrow. ROS: reviewed complete ROS unchanged unless otherwise stated in hospital course/subjective portion.        Medications:  Reviewed    Infusion Medications    sodium chloride       Scheduled Medications    doxazosin  8 mg Oral 2 times per day    labetalol  50 mg Oral 2 times per day    cloNIDine  0.2 mg Oral q8h    isosorbide mononitrate  120 mg Oral BID    sodium chloride flush  5-40 mL IntraVENous 2 times per day    aspirin  81 mg Oral Daily    atorvastatin  40 mg Oral Nightly    calcitRIOL  2.25 mcg Oral Once per day on     famotidine  20 mg Oral Daily    ferrous sulfate  325 mg Oral Daily with breakfast    fluticasone  1 spray Nasal Daily    cetirizine  10 mg Oral Daily    verapamil  240 mg Oral Daily    sevelamer  1,600 mg Oral TID WC    sertraline  100 mg Oral Daily    Vitamin D  1,000 Units Oral BID    heparin (porcine)  5,000 Units SubCUTAneous 3 times per day     PRN Meds: guaiFENesin-dextromethorphan, hydrALAZINE, sodium chloride flush, sodium chloride, ondansetron **OR** ondansetron, polyethylene glycol, acetaminophen **OR** acetaminophen, traZODone        Intake/Output Summary (Last 24 hours) at 2/6/2023 1347  Last data filed at 2/6/2023 1306  Gross per 24 hour   Intake 620 ml   Output 0 ml   Net 620 ml       Exam:  /77   Pulse 85   Temp 98.5 °F (36.9 °C) (Tympanic)   Resp 16   Ht 6' 3\" (1.905 m)   Wt 232 lb 3.2 oz (105.3 kg)   SpO2 98%   BMI 29.02 kg/m²     General: No distress, appears stated age. Eyes:  PERRL. Conjunctivae/corneas clear. HENT: Head normal appearing. Nares normal. Oral mucosa moist.  Hearing intact. Neck: Supple, with full range of motion. Trachea midline. No gross JVD appreciated. Respiratory:  Normal effort. Tight right lung sounds. Cardiovascular: Normal rate, regular rhythm with normal R9/L3. 4/6 systolic murmur best auscultated at mitral post, no rubs or gallops   No lower extremity edema. LUE fistula noted. Abdomen: Soft, non-tender, non-distended with normal bowel sounds. Musculoskeletal: No joint swelling or tenderness. Normal tone. No abnormal movements. Skin: Warm and dry. No rashes or lesions. Neurologic:  No focal sensory/motor deficits in the upper or lower extremities. Cranial nerves:  grossly non-focal 2-12. Psychiatric: Alert and oriented, normal insight and thought content. Capillary Refill: Brisk,< 3 seconds. Peripheral Pulses: +2 palpable, equal bilaterally.        Labs:   Recent Labs     02/04/23  0605 02/05/23  0419 02/06/23  0345   WBC 7.3 4.1* 4.0*   HGB 11.8* 10.4* 10.0*   HCT 39.7* 35.8* 33.7*   * 76* 77*     Recent Labs     02/04/23  0605 02/05/23  0419 02/06/23  0345    137 138   K 5.9* 5.1 5.4*    101 101   CO2 21* 24 23   BUN 80* 45* 61*   CREATININE 13.0* 8.5* 10.8*   CALCIUM 8.6 8.4* 8.3*   PHOS 6.1* 4.4 4.5     No results for input(s): AST, ALT, BILIDIR, BILITOT, ALKPHOS in the last 72 hours. No results for input(s): INR in the last 72 hours. Recent Labs     02/04/23  0605   TROPONINT 0.063*     No results for input(s): PROCAL in the last 72 hours. Lab Results   Component Value Date/Time    NITRU NEGATIVE 04/03/2018 07:45 PM    WBCUA 2-4 04/03/2018 07:45 PM    BACTERIA NONE 04/03/2018 07:45 PM    RBCUA 5-10 04/03/2018 07:45 PM    BLOODU SMALL 04/03/2018 07:45 PM    SPECGRAV 1.014 03/07/2018 09:10 PM    GLUCOSEU NEGATIVE 04/03/2018 07:45 PM       Radiology (48 hours):  XR CHEST (2 VW)    Result Date: 2/4/2023  1. Cardiomegaly with small pleural effusions and hazy bibasilar airspace opacities which may indicate edema or pneumonia. This document has been electronically signed by: Chaya Gallagher MD on 02/04/2023 06:37 AM       DVT prophylaxis:    [] Lovenox  [] SCDs  [x] SQ Heparin  [] Encourage ambulation   [] Already on Anticoagulation       Diet: ADULT DIET; Regular; Low Sodium (2 gm); Low Potassium (Less than 3000 mg/day);  Low Phosphorus (Less than 1000 mg); 2000 ml  Code Status: Full Code  PT/OT: as tolerated  Tele: yes  IVF: as needed    Electronically signed by Kimber Talbert DO on 2/6/2023 at 1:47 PM    Case was discussed with Attending, Dr. Simon Duarte MD

## 2023-02-06 NOTE — CARE COORDINATION
Case Management Assessment  Initial Evaluation    Date/Time of Evaluation: 2/6/2023 1:53 PM  Assessment Completed by: Petros Watson RN    If patient is discharged prior to next notation, then this note serves as note for discharge by case management. Patient Name: Aby Reyes                   YOB: 1965  Diagnosis: Hyperkalemia [E87.5]  Acute pulmonary edema (HCC) [J81.0]  Acute respiratory failure with hypoxia (HCC) [J96.01]  Acute on chronic respiratory failure with hypoxia ([address] [J96.21]                   Date / Time: 2/4/2023  5:49 AM  Location: [address] [place] [address]     Patient Admission Status: Inpatient   Readmission Risk Low 0-14, Mod 15-19), High > 20: Readmission Risk Score: 21.5    Current PCP: WADE Montesinos CNP  PCP verified by CM? Yes    Chart Reviewed: Yes      History Provided by: Patient, Medical Record  Patient Orientation: Alert and Oriented    Patient Cognition: Alert    Hospitalization in the last 30 days (Readmission):  No    If yes, Readmission Assessment in CM Navigator will be completed. Advance Directives:      Code Status: Full Code   Patient's Primary Decision Maker is: Legal Next of Kin      Discharge Planning:    Patient lives with: Spouse/Significant Other Type of Home: House  Primary Care Giver: Self  Patient Support Systems include: Spouse/Significant Other   Current Financial resources: Medicaid, Medicare  Current community resources: None  Current services prior to admission: Other (Comment) (OP HD)            Current DME:              Type of Home Care services:  None    ADLS  Prior functional level: Independent in ADLs/IADLs  Current functional level: Independent in ADLs/IADLs    Family can provide assistance at DC: Yes  Would you like Case Management to discuss the discharge plan with any other family members/significant others, and if so, who?  No  Plans to Return to Present Housing: Yes  Other Identified Issues/Barriers to RETURNING to current housing: yes  Potential Assistance needed at discharge: N/A            Potential DME:    Patient expects to discharge to: [facility] for transportation at discharge: [address]: [facility] / Plan: [facility] / Product Type: *No Product type* /     Does insurance require precert for SNF: Yes    Potential assistance Purchasing Medications: No  Meds-to-Beds request: Yes      Sara Daniel [id number] [facility], [address]  [address]  [facility] [address]  Phone: [phone] Fax: [phone]    [address] - [place] [phone]  [facility] [phone]  [facility] [facility] [address] Mendez, [address] [place] [address]  Phone: [phone] Fax: [phone]    [address] [place] [address] [id number] [place] [address], [address] [place] [address] [phone] [facility] [phone]  [address] [place] [id number]  Phone: [phone] Fax: [phone]      Notes:    Factors facilitating achievement of predicted outcomes: Family support    Barriers to discharge: Limited family support    Additional Case Management Notes:   HTN/CHF (missed HD x2)  History: Cocaine Use, DM, HD client, Smoker, Right Thoracotomy    Oxygen 4L, /s last 24h; monitor  Staff weaning oxygen    The Plan for Transition of Care is related to the following treatment goals of Hyperkalemia [E87.5]  Acute pulmonary edema (HCC) [J81.0]  Acute respiratory failure with hypoxia ([address] [J96.01]  Acute on chronic respiratory failure with hypoxia ([address] [J96.21]    Patient Goals/Plan/Treatment Preferences: plans home w spouse Emmanuel, current w [address] [employer]304 am chair time; has walker, has [facility] home oxygen 2L in past; patient reports 4-6L; [employer] transports to HD, has AVF  Transportation/Food Security/Housekeeping Addressed: No issues identified.      Guilherme Peng RN  Case Management Department    2/6/23, 2:20 PM EST    Patient goals/plan/ treatment preferences discussed by  and . Patient goals/plan/ treatment preferences reviewed with patient/ family. Patient/ family verbalize understanding of discharge plan and are in agreement with goal/plan/treatment preferences. Understanding was demonstrated using the teach back method. AVS provided by RN at time of discharge, which includes all necessary medical information pertaining to the patients current course of illness, treatment, post-discharge goals of care, and treatment preferences.      Services At/After Discharge: None       IMM Letter  IMM Letter given to Patient/Family/Significant other/Guardian/POA/by[de-identified] Staff  IMM Letter date given[de-identified] 02/04/23  IMM Letter time given[de-identified] 6524

## 2023-02-06 NOTE — CARE COORDINATION
02/06/23 1149   Service Assessment   Patient Orientation Alert and Oriented   Cognition Alert   History Provided By Patient;Medical Record   Primary Caregiver Self   Accompanied By/Relationship none   Support Systems Spouse/Significant Other   Patient's Healthcare Decision Maker is: Legal Next of Kin   PCP Verified by CM Yes   Last Visit to PCP Within last 3 months   Prior Functional Level Independent in ADLs/IADLs   Current Functional Level Independent in ADLs/IADLs   Can patient return to prior living arrangement Yes   Ability to make needs known: Good   Family able to assist with home care needs: Yes   Would you like for me to discuss the discharge plan with any other family members/significant others, and if so, who? No   Financial Resources Medicaid; Medicare   Community Resources None   CM/SW Referral ADLs/IADLs   Discharge Planning   Type of Residence House   Living Arrangements Spouse/Significant Other   Current Services Prior To Admission Other (Comment)  (OP HD)   Potential Assistance Needed N/A   DME Ordered? No   Potential Assistance Purchasing Medications No   Type of Home Care Services None   Patient expects to be discharged to: House   Follow Up Appointment: Best Day/Time    (non-HD days)   History of falls? 0   Services At/After Discharge   Transition of Care Consult (CM Consult) N/A   Services At/After Discharge None   [employer] Information Provided? No   Mode of Transport at Discharge Other (see comment)  ([employer])   Confirm Follow Up Transport Cab   Condition of Participation: Discharge Planning   The Plan for Transition of Care is related to the following treatment goals: HTN/CHF treatment   Freedom of Choice list was provided with basic dialogue that supports the patient's individualized plan of care/goals, treatment preferences, and shares the quality data associated with the providers?   No

## 2023-02-06 NOTE — PROGRESS NOTES
Physician Progress Note      PATIENT:               Annika Man  CSN #:                  [id number]  :                       1965  ADMIT DATE:       2023 5:49 AM  DISCH DATE:  RESPONDING  PROVIDER #:        Mary Ordonez          QUERY TEXT:    Dr Vanessa Hernandez, Dr Crystal Alvarez,    Pt admitted with pulmonary edema. Per HP:  likely secondary to missed dialysis   and HFpEF. Per Nephrology: Fluid overload from missed hemodialysis. If   possible, please document in the progress notes and discharge summary if you   are evaluating and/or treating any of the following:[[Acute pulmonary edema   due to Cardiorenal Syndrome.::This patient has Acute pulmonary edema due to   Cardiorenal syndrome. }}    The medical record reflects the following:  Risk Factors: ESRD, DM, REZA. Missed 2 dialysis treatments PTA. Clinical Indicators: Per HP:  likely secondary to missed dialysis and HFpEF. Per Nephrology: Fluid overload from missed hemodialysis. Acute Respiratory   failure. BNP [id number]. Treatment: Emergent Dialysis. Strict I/Os, daily weight, 2L fluid restriction. Options provided:  -- Noncardiogenic acute pulmonary edema due to ESRD with fluid overload   related to Missed Dialysis. -- Acute pulmonary edema due to Acute HFpEF exacerbation. -- Other - I will add my own diagnosis  -- Disagree - Not applicable / Not valid  -- Disagree - Clinically unable to determine / Unknown  -- Refer to Clinical Documentation Reviewer    PROVIDER RESPONSE TEXT:    This patient has noncardiogenic acute pulmonary edema due to ESRD with fluid   overload related to Missed Dialysis.     Query created by: Laura Roger on 2023 6:48 AM      Electronically signed by:  Mary Ordonez 2023 6:55 AM

## 2023-02-06 NOTE — PROGRESS NOTES
[facility]   Nephrology progress note  2/6/2023, 10:40 AM      Pt Name:    Amanda Quiroga  MRN:     [id number]     YOB: 1965  Admit Date:    2/4/2023  5:49 AM    Chief Complaint: Nephrology following for ESRD on hemodialysis. Subjective:  Patient seen and examined  No chest pain. Shortness of breath is better  He is not in any acute distress    Objective:  24HR INTAKE/OUTPUT:    Intake/Output Summary (Last 24 hours) at 2/6/2023 1040  Last data filed at 2/6/2023 0859  Gross per 24 hour   Intake 500 ml   Output 0 ml   Net 500 ml      Admission weight: 219 lb 9.3 oz (99.6 kg)  Wt Readings from Last 3 Encounters:   02/06/23 232 lb 3.2 oz (105.3 kg)   12/14/22 229 lb (103.9 kg)   10/20/22 222 lb (100.7 kg)        Vitals :   Vitals:    02/05/23 2254 02/06/23 0400 02/06/23 0645 02/06/23 0730   BP: (!) 185/85 (!) 164/83  (!) 192/85   Pulse: 93 91 84 94   Resp: 16 16  17   Temp: 98.8 °F (37.1 °C) 98.1 °F (36.7 °C)  98 °F (36.7 °C)   TempSrc: Oral Oral  Oral   SpO2: 100% 100%  93%   Weight:  232 lb 3.2 oz (105.3 kg)     Height:           Physical examination  General Appearance:  Well developed.  No distress  Mouth/Throat:  Oral mucosa moist  Neck:  Supple, no JVD  Lungs:  Breath sounds: clear  Heart[de-identified]  S1,S2 heard  Abdomen:  Soft, non - tender  Musculoskeletal:  Edema -no edema noted    Medications:  Infusion:    sodium chloride       Meds:    doxazosin  8 mg Oral 2 times per day    labetalol  50 mg Oral 2 times per day    isosorbide mononitrate  120 mg Oral BID    sodium chloride flush  5-40 mL IntraVENous 2 times per day    aspirin  81 mg Oral Daily    atorvastatin  40 mg Oral Nightly    calcitRIOL  2.25 mcg Oral Once per day on Mon Wed Fri    famotidine  20 mg Oral Daily    ferrous sulfate  325 mg Oral Daily with breakfast    fluticasone  1 spray Nasal Daily    cetirizine  10 mg Oral Daily    verapamil  240 mg Oral Daily    sevelamer  1,600 mg Oral TID WC    sertraline  100 mg Oral Daily    Vitamin D  1,000 Units Oral BID    heparin (porcine)  5,000 Units SubCUTAneous 3 times per day    cloNIDine  0.1 mg Oral TID       Lab Data :  CBC:   Recent Labs     02/04/23  0605 02/05/23 0419 02/06/23 0345   WBC 7.3 4.1* 4.0*   HGB 11.8* 10.4* 10.0*   HCT 39.7* 35.8* 33.7*   * 76* 77*     CMP:  Recent Labs     02/04/23 0605 02/05/23 0419 02/06/23 0345    137 138   K 5.9* 5.1 5.4*    101 101   CO2 21* 24 23   BUN 80* 45* 61*   CREATININE 13.0* 8.5* 10.8*   GLUCOSE 122* 135* 83   CALCIUM 8.6 8.4* 8.3*   MG 2.8* 2.4 2.8*   PHOS 6.1* 4.4 4.5     Assessment and Plan:  Renal -ESRD on hemodialysis Tuesday Thursday Saturday  Volume status appears reasonable  No acute need for dialysis today  We will get him dialyzed in the morning    Electrolytes -mild hyperkalemia we will give 10 g of Lokelma today  Anemia of renal dysfunction stable  Shortness of breath-thought to be due to fluid overload he is apparently under his dry weight as of Saturday. He still on 4 L we will try to attempt more ultrafiltration again tomorrow  Essential hypertension running high change clonidine to 0.2 every 8 hours  Secondary hyperparathyroidism  Meds reviewed and discussed with patient    Kade Isaacs MD  [facility]    This report has been created using voice recognition software.  It may contain minor errors which are inherent in voice recognition technology

## 2023-02-07 VITALS
HEART RATE: 78 BPM | HEIGHT: 75 IN | RESPIRATION RATE: 20 BRPM | BODY MASS INDEX: 29.69 KG/M2 | SYSTOLIC BLOOD PRESSURE: 185 MMHG | TEMPERATURE: 98.4 F | DIASTOLIC BLOOD PRESSURE: 91 MMHG | WEIGHT: 238.76 LBS | OXYGEN SATURATION: 94 %

## 2023-02-07 LAB
ANION GAP SERPL CALC-SCNC: 21 MEQ/L (ref 8–16)
BUN SERPL-MCNC: 76 MG/DL (ref 7–22)
CALCIUM SERPL-MCNC: 8.7 MG/DL (ref 8.5–10.5)
CHLORIDE SERPL-SCNC: 98 MEQ/L (ref 98–111)
CO2 SERPL-SCNC: 18 MEQ/L (ref 23–33)
CREAT SERPL-MCNC: 12.7 MG/DL (ref 0.4–1.2)
DEPRECATED RDW RBC AUTO: 55.8 FL (ref 35–45)
ERYTHROCYTE [DISTWIDTH] IN BLOOD BY AUTOMATED COUNT: 14.8 % (ref 11.5–14.5)
GFR SERPL CREATININE-BSD FRML MDRD: 4 ML/MIN/1.73M2
GLUCOSE SERPL-MCNC: 94 MG/DL (ref 70–108)
HCT VFR BLD AUTO: 33.3 % (ref 42–52)
HGB BLD-MCNC: 9.7 GM/DL (ref 14–18)
MAGNESIUM SERPL-MCNC: 3.1 MG/DL (ref 1.6–2.4)
MCH RBC QN AUTO: 30.1 PG (ref 26–33)
MCHC RBC AUTO-ENTMCNC: 29.1 GM/DL (ref 32.2–35.5)
MCV RBC AUTO: 103.4 FL (ref 80–94)
PHOSPHATE SERPL-MCNC: 5.1 MG/DL (ref 2.4–4.7)
PLATELET # BLD AUTO: 78 THOU/MM3 (ref 130–400)
PMV BLD AUTO: 11.8 FL (ref 9.4–12.4)
POTASSIUM SERPL-SCNC: 5.6 MEQ/L (ref 3.5–5.2)
RBC # BLD AUTO: 3.22 MILL/MM3 (ref 4.7–6.1)
SODIUM SERPL-SCNC: 137 MEQ/L (ref 135–145)
WBC # BLD AUTO: 3.6 THOU/MM3 (ref 4.8–10.8)

## 2023-02-07 PROCEDURE — 90935 HEMODIALYSIS ONE EVALUATION: CPT | Performed by: INTERNAL MEDICINE

## 2023-02-07 PROCEDURE — 84100 ASSAY OF PHOSPHORUS: CPT

## 2023-02-07 PROCEDURE — 83735 ASSAY OF MAGNESIUM: CPT

## 2023-02-07 PROCEDURE — 6370000000 HC RX 637 (ALT 250 FOR IP): Performed by: INTERNAL MEDICINE

## 2023-02-07 PROCEDURE — 6370000000 HC RX 637 (ALT 250 FOR IP)

## 2023-02-07 PROCEDURE — 36415 COLL VENOUS BLD VENIPUNCTURE: CPT

## 2023-02-07 PROCEDURE — 80048 BASIC METABOLIC PNL TOTAL CA: CPT

## 2023-02-07 PROCEDURE — 6370000000 HC RX 637 (ALT 250 FOR IP): Performed by: PHYSICIAN ASSISTANT

## 2023-02-07 PROCEDURE — 2580000003 HC RX 258: Performed by: PHYSICIAN ASSISTANT

## 2023-02-07 PROCEDURE — 85027 COMPLETE CBC AUTOMATED: CPT

## 2023-02-07 PROCEDURE — 99239 HOSP IP/OBS DSCHRG MGMT >30: CPT | Performed by: INTERNAL MEDICINE

## 2023-02-07 PROCEDURE — 6360000002 HC RX W HCPCS: Performed by: PHYSICIAN ASSISTANT

## 2023-02-07 PROCEDURE — 90935 HEMODIALYSIS ONE EVALUATION: CPT

## 2023-02-07 PROCEDURE — 5A1D70Z PERFORMANCE OF URINARY FILTRATION, INTERMITTENT, LESS THAN 6 HOURS PER DAY: ICD-10-PCS | Performed by: INTERNAL MEDICINE

## 2023-02-07 PROCEDURE — 6370000000 HC RX 637 (ALT 250 FOR IP): Performed by: STUDENT IN AN ORGANIZED HEALTH CARE EDUCATION/TRAINING PROGRAM

## 2023-02-07 RX ORDER — LABETALOL 100 MG/1
50 TABLET, FILM COATED ORAL EVERY 12 HOURS SCHEDULED
Qty: 60 TABLET | Refills: 3 | Status: SHIPPED | OUTPATIENT
Start: 2023-02-07 | End: 2023-02-07 | Stop reason: HOSPADM

## 2023-02-07 RX ORDER — LABETALOL 100 MG/1
50 TABLET, FILM COATED ORAL 2 TIMES DAILY
Qty: 30 TABLET | Refills: 0 | Status: SHIPPED | OUTPATIENT
Start: 2023-02-07 | End: 2023-03-09

## 2023-02-07 RX ORDER — ISOSORBIDE MONONITRATE 120 MG/1
120 TABLET, EXTENDED RELEASE ORAL 2 TIMES DAILY
Qty: 60 TABLET | Refills: 0 | Status: SHIPPED
Start: 2023-02-07 | End: 2023-02-07 | Stop reason: SDUPTHER

## 2023-02-07 RX ORDER — ISOSORBIDE MONONITRATE 120 MG/1
120 TABLET, EXTENDED RELEASE ORAL 2 TIMES DAILY
Qty: 60 TABLET | Refills: 0 | Status: SHIPPED | OUTPATIENT
Start: 2023-02-07 | End: 2023-03-09

## 2023-02-07 RX ORDER — CLONIDINE HYDROCHLORIDE 0.2 MG/1
0.2 TABLET ORAL 3 TIMES DAILY
Qty: 30 TABLET | Refills: 3 | Status: SHIPPED
Start: 2023-02-07 | End: 2023-02-07 | Stop reason: SDUPTHER

## 2023-02-07 RX ORDER — DOXAZOSIN 8 MG/1
8 TABLET ORAL EVERY 12 HOURS SCHEDULED
Qty: 60 TABLET | Refills: 0 | Status: SHIPPED
Start: 2023-02-07 | End: 2023-02-07 | Stop reason: SDUPTHER

## 2023-02-07 RX ORDER — DOXAZOSIN 8 MG/1
8 TABLET ORAL EVERY 12 HOURS SCHEDULED
Qty: 60 TABLET | Refills: 0 | Status: SHIPPED | OUTPATIENT
Start: 2023-02-07 | End: 2023-03-09

## 2023-02-07 RX ORDER — CETIRIZINE HYDROCHLORIDE 10 MG/1
10 TABLET ORAL DAILY
Qty: 30 TABLET | Refills: 0 | Status: SHIPPED | OUTPATIENT
Start: 2023-02-07 | End: 2023-03-09

## 2023-02-07 RX ORDER — LABETALOL 100 MG/1
50 TABLET, FILM COATED ORAL 2 TIMES DAILY
Qty: 30 TABLET | Refills: 0 | Status: SHIPPED
Start: 2023-02-07 | End: 2023-02-07 | Stop reason: SDUPTHER

## 2023-02-07 RX ORDER — CLONIDINE HYDROCHLORIDE 0.2 MG/1
0.2 TABLET ORAL 3 TIMES DAILY
Qty: 30 TABLET | Refills: 0 | Status: SHIPPED | OUTPATIENT
Start: 2023-02-07 | End: 2023-03-19

## 2023-02-07 RX ADMIN — FAMOTIDINE 20 MG: 20 TABLET, FILM COATED ORAL at 12:55

## 2023-02-07 RX ADMIN — FERROUS SULFATE TAB 325 MG (65 MG ELEMENTAL FE) 325 MG: 325 (65 FE) TAB at 12:55

## 2023-02-07 RX ADMIN — GUAIFENESIN AND DEXTROMETHORPHAN 5 ML: 100; 10 SYRUP ORAL at 06:34

## 2023-02-07 RX ADMIN — SEVELAMER CARBONATE 1600 MG: 800 TABLET, FILM COATED ORAL at 06:37

## 2023-02-07 RX ADMIN — SERTRALINE 100 MG: 100 TABLET, FILM COATED ORAL at 12:55

## 2023-02-07 RX ADMIN — VERAPAMIL HYDROCHLORIDE 240 MG: 240 TABLET, FILM COATED, EXTENDED RELEASE ORAL at 06:43

## 2023-02-07 RX ADMIN — ISOSORBIDE MONONITRATE 120 MG: 60 TABLET, EXTENDED RELEASE ORAL at 06:39

## 2023-02-07 RX ADMIN — CETIRIZINE HYDROCHLORIDE 10 MG: 10 TABLET, FILM COATED ORAL at 12:55

## 2023-02-07 RX ADMIN — LABETALOL HYDROCHLORIDE 50 MG: 100 TABLET, FILM COATED ORAL at 06:40

## 2023-02-07 RX ADMIN — ASPIRIN 81 MG: 81 TABLET, COATED ORAL at 12:55

## 2023-02-07 RX ADMIN — SODIUM CHLORIDE, PRESERVATIVE FREE 10 ML: 5 INJECTION INTRAVENOUS at 12:55

## 2023-02-07 RX ADMIN — ACETAMINOPHEN 650 MG: 325 TABLET ORAL at 12:55

## 2023-02-07 RX ADMIN — SEVELAMER CARBONATE 1600 MG: 800 TABLET, FILM COATED ORAL at 12:55

## 2023-02-07 RX ADMIN — DOXAZOSIN 8 MG: 4 TABLET ORAL at 06:41

## 2023-02-07 RX ADMIN — CLONIDINE HYDROCHLORIDE 0.2 MG: 0.2 TABLET ORAL at 12:55

## 2023-02-07 RX ADMIN — CLONIDINE HYDROCHLORIDE 0.2 MG: 0.2 TABLET ORAL at 06:42

## 2023-02-07 RX ADMIN — HEPARIN SODIUM 5000 UNITS: 5000 INJECTION INTRAVENOUS; SUBCUTANEOUS at 06:34

## 2023-02-07 RX ADMIN — Medication 1000 UNITS: at 12:55

## 2023-02-07 ASSESSMENT — PAIN DESCRIPTION - ORIENTATION: ORIENTATION: LOWER

## 2023-02-07 ASSESSMENT — PAIN - FUNCTIONAL ASSESSMENT: PAIN_FUNCTIONAL_ASSESSMENT: ACTIVITIES ARE NOT PREVENTED

## 2023-02-07 ASSESSMENT — PAIN SCALES - GENERAL
PAINLEVEL_OUTOF10: 3
PAINLEVEL_OUTOF10: 0

## 2023-02-07 ASSESSMENT — PAIN DESCRIPTION - DESCRIPTORS: DESCRIPTORS: ACHING;DISCOMFORT

## 2023-02-07 ASSESSMENT — PAIN DESCRIPTION - LOCATION: LOCATION: BACK

## 2023-02-07 NOTE — PROGRESS NOTES
Discharge teaching and instructions for diagnosis/procedure of acute hypoxic respiratory failure completed with patient using teachback method. AVS reviewed. Printed prescriptions given to patient. Patient voiced understanding regarding prescriptions, follow up appointments, and care of self at home. Discharged in a wheelchair to  home with support per family.

## 2023-02-07 NOTE — CARE COORDINATION
2/7/23, 11:05 AM EST    Patient goals/plan/ treatment preferences discussed by  and . Patient goals/plan/ treatment preferences reviewed with patient/ family. Patient/ family verbalize understanding of discharge plan and are in agreement with goal/plan/treatment preferences. Understanding was demonstrated using the teach back method. AVS provided by RN at time of discharge, which includes all necessary medical information pertaining to the patients current course of illness, treatment, post-discharge goals of care, and treatment preferences.      Services At/After Discharge: Community Resources       IMM Letter  IMM Letter given to Patient/Family/Significant other/Guardian/POA/by[de-identified] GARLAND Saldivar CM  IMM Letter date given[de-identified] 02/07/23  IMM Letter time given[de-identified] [id number]

## 2023-02-07 NOTE — PROGRESS NOTES
[facility]   Nephrology progress note  2/7/2023, 8:38 AM      Pt Name:    Nicci Coreas  MRN:     [id number]     YOB: 1965  Admit Date:    2/4/2023  5:49 AM    Chief Complaint: Nephrology following for ESRD on hemodialysis. Subjective:  Patient seen and examined  No chest pain. Shortness of breath is better  He is not in any acute distress    Objective:  24HR INTAKE/OUTPUT:    Intake/Output Summary (Last 24 hours) at 2/7/2023 0838  Last data filed at 2/7/2023 0530  Gross per 24 hour   Intake 860 ml   Output 0 ml   Net 860 ml        Admission weight: 219 lb 9.3 oz (99.6 kg)  Wt Readings from Last 3 Encounters:   02/07/23 225 lb 8.5 oz (102.3 kg)   12/14/22 229 lb (103.9 kg)   10/20/22 222 lb (100.7 kg)        Vitals :   Vitals:    02/06/23 2334 02/07/23 0330 02/07/23 0615 02/07/23 0725   BP: (!) 146/73 (!) 165/74 (!) 175/84 (!) 126/59   Pulse: 83 83  75   Resp: 16 16  22   Temp: 98.5 °F (36.9 °C) 98 °F (36.7 °C)  97.6 °F (36.4 °C)   TempSrc: Oral Oral     SpO2: 98% 95%  93%   Weight:    225 lb 8.5 oz (102.3 kg)   Height:           Physical examination  General Appearance:  Well developed.  No distress  Mouth/Throat:  Oral mucosa moist  Neck:  Supple, no JVD  Lungs:  Breath sounds: Diminished  Heart[de-identified]  S1,S2 heard  Abdomen:  Soft, non - tender  Musculoskeletal:  Edema -no edema noted    Medications:  Infusion:    sodium chloride       Meds:    doxazosin  8 mg Oral 2 times per day    labetalol  50 mg Oral 2 times per day    cloNIDine  0.2 mg Oral q8h    isosorbide mononitrate  120 mg Oral BID    sodium chloride flush  5-40 mL IntraVENous 2 times per day    aspirin  81 mg Oral Daily    atorvastatin  40 mg Oral Nightly    calcitRIOL  2.25 mcg Oral Once per day on Mon Wed Fri    famotidine  20 mg Oral Daily    ferrous sulfate  325 mg Oral Daily with breakfast    fluticasone  1 spray Nasal Daily    cetirizine  10 mg Oral Daily    verapamil  240 mg Oral Daily    sevelamer  1,600 mg Oral TID WC    sertraline  100 mg Oral Daily    Vitamin D  1,000 Units Oral BID    heparin (porcine)  5,000 Units SubCUTAneous 3 times per day       Lab Data :  CBC:   Recent Labs     02/05/23 0419 02/06/23 0345 02/07/23 0418   WBC 4.1* 4.0* 3.6*   HGB 10.4* 10.0* 9.7*   HCT 35.8* 33.7* 33.3*   PLT 76* 77* 78*       CMP:  Recent Labs     02/05/23 0419 02/06/23 0345 02/07/23 0418    138 137   K 5.1 5.4* 5.6*    101 98   CO2 24 23 18*   BUN 45* 61* 76*   CREATININE 8.5* 10.8* 12.7*   GLUCOSE 135* 83 94   CALCIUM 8.4* 8.3* 8.7   MG 2.4 2.8* 3.1*   PHOS 4.4 4.5 5.1*       Assessment and Plan:  Renal -ESRD on hemodialysis Tuesday Thursday Saturday  Continues to require slightly higher doses of oxygen  During dialysis tolerating procedure well blood pressure 730 systolic and ultrafiltration 4400 mL    Electrolytes -mild hyperkalemia being dialyzed on a 2 potassium bath  Anemia of renal dysfunction stable  Metabolic acidosis should be corrected with dialysis  Shortness of breath-thought to be due to fluid overload he is apparently under his dry weight as of Saturday. We will aim for another 4 L ultrafiltration today and see how he does  Essential hypertension running much better  Secondary hyperparathyroidism  Meds reviewed and discussed with patient and HD staff    Pamelia Jeans, MD  [facility]    This report has been created using voice recognition software.  It may contain minor errors which are inherent in voice recognition technology

## 2023-02-07 NOTE — FLOWSHEET NOTE
Stable 4 hour TX completed. Removed 4 L of fluid. pt tolerated fluid removal well. Pressure held to each needle site x 10 min. Drsg clean, dry, and intact upon leaving unit. TX record printed for scanning into EMR. Report called to primary RN.    02/07/23 0725 02/07/23 1200   Vital Signs   BP (!) 126/59 (!) 185/91   Temp 97.6 °F (36.4 °C) 98.4 °F (36.9 °C)   Heart Rate 75 78   Resp 22 20   SpO2 93 % 94 %   Weight 225 lb 8.5 oz (102.3 kg) 238 lb 12.1 oz (108.3 kg)   Weight Method  --  Bed scale   Percent Weight Change  --  5.87   Post-Hemodialysis Assessment   Post-Treatment Procedures  --  Blood returned; Access bleeding time < 10 minutes   Machine Disinfection Process  --  Acid/Vinegar Clean;Heat Disinfect; Exterior Machine Disinfection   Blood Volume Processed (Liters)  --  100.5 l/min   Dialyzer Clearance  --  Lightly streaked   Duration of Treatment (minutes)  --  240 minutes   Heparin Amount Administered During Treatment (mL)  --  0 mL   Hemodialysis Intake (ml)  --  400 ml   Hemodialysis Output (ml)  --  4400 ml   NET Removed (ml)  --  4000   Tolerated Treatment  --  Good

## 2023-02-07 NOTE — PLAN OF CARE
Problem: Discharge Planning  Goal: Discharge to home or other facility with appropriate resources  Outcome: Progressing  Flowsheets (Taken 2/6/2023 1930)  Discharge to home or other facility with appropriate resources: Identify barriers to discharge with patient and caregiver     Problem: Safety - Adult  Goal: Free from fall injury  Outcome: Progressing  Flowsheets (Taken 2/7/2023 0520)  Free From Fall Injury:   Instruct family/caregiver on patient safety   Based on caregiver fall risk screen, instruct family/caregiver to ask for assistance with transferring infant if caregiver noted to have fall risk factors     Problem: ABCDS Injury Assessment  Goal: Absence of physical injury  Outcome: Progressing  Flowsheets (Taken 2/7/2023 0520)  Absence of Physical Injury: Implement safety measures based on patient assessment     Problem: Pain  Goal: Verbalizes/displays adequate comfort level or baseline comfort level  Outcome: Progressing  Flowsheets (Taken 2/5/2023 1945 by Mariajose Goldman RN)  Verbalizes/displays adequate comfort level or baseline comfort level:   Encourage patient to monitor pain and request assistance   Assess pain using appropriate pain scale   Administer analgesics based on type and severity of pain and evaluate response   Implement non-pharmacological measures as appropriate and evaluate response     Problem: Chronic Conditions and Co-morbidities  Goal: Patient's chronic conditions and co-morbidity symptoms are monitored and maintained or improved  Outcome: Progressing  Flowsheets (Taken 2/6/2023 1930)  Care Plan - Patient's Chronic Conditions and Co-Morbidity Symptoms are Monitored and Maintained or Improved: Monitor and assess patient's chronic conditions and comorbid symptoms for stability, deterioration, or improvement     Problem: Respiratory - Adult  Goal: Achieves optimal ventilation and oxygenation  Outcome: Progressing  Flowsheets (Taken 2/6/2023 1930)  Achieves optimal ventilation and oxygenation:   Assess for changes in respiratory status   Assess for changes in mentation and behavior   Position to facilitate oxygenation and minimize respiratory effort   Oxygen supplementation based on oxygen saturation or arterial blood gases     Problem: Cardiovascular - Adult  Goal: Maintains optimal cardiac output and hemodynamic stability  Outcome: Progressing  Flowsheets (Taken 2/6/2023 1930)  Maintains optimal cardiac output and hemodynamic stability:   Monitor blood pressure and heart rate   Assess for signs of decreased cardiac output     Problem: Metabolic/Fluid and Electrolytes - Adult  Goal: Electrolytes maintained within normal limits  Outcome: Progressing  Flowsheets  Taken 2/7/2023 0520  Electrolytes maintained within normal limits:   Monitor labs and assess patient for signs and symptoms of electrolyte imbalances   Administer electrolyte replacement as ordered   Monitor response to electrolyte replacements, including repeat lab results as appropriate   Fluid restriction as ordered  Taken 2/6/2023 1930  Electrolytes maintained within normal limits:   Monitor labs and assess patient for signs and symptoms of electrolyte imbalances   Administer electrolyte replacement as ordered   Monitor response to electrolyte replacements, including repeat lab results as appropriate  Goal: Hemodynamic stability and optimal renal function maintained  Outcome: Progressing  Flowsheets  Taken 2/7/2023 0520  Hemodynamic stability and optimal renal function maintained:   Monitor labs and assess for signs and symptoms of volume excess or deficit   Monitor intake, output and patient weight   Monitor urine specific gravity, serum osmolarity and serum sodium as indicated or ordered  Taken 2/6/2023 1930  Hemodynamic stability and optimal renal function maintained:   Monitor labs and assess for signs and symptoms of volume excess or deficit   Monitor intake, output and patient weight     Problem: Hematologic - Adult  Goal: Maintains hematologic stability  Outcome: Progressing  Flowsheets  Taken 2/7/2023 0520  Maintains hematologic stability:   Assess for signs and symptoms of bleeding or hemorrhage   Monitor labs for bleeding or clotting disorders   Administer blood products/factors as ordered  Taken 2/6/2023 1930  Maintains hematologic stability:   Assess for signs and symptoms of bleeding or hemorrhage   Monitor labs for bleeding or clotting disorders     Care plan reviewed with patient. Patient verbalize understanding of the plan of care and contribute to goal setting.

## 2023-02-07 NOTE — DISCHARGE SUMMARY
Internal Medicine Resident Discharge Summary      Patient Identification:   Marc Streeter   : 1965  MRN: [id number]   Account: [de-identified]   Patient's PCP: WADE Gill CNP    Admit Date: 2023   Discharge Date: 2023      Admitting Physician: No admitting provider for patient encounter. Discharge Physician: Madan Constantino DO       Discharge Diagnoses:    Poorly controlled HTN: likely secondary to ESRD. Reports compliance w/ OP med regimen. Notes sig elevated BP at home w/ labile BP. Continued home Clonidine 0.1mg TID, Verapamil 240mg daily. Increased Imdur to 120mg BID and Doxazosin 8mg BID. Added Labetalol 50mg BID. Improved w/ HD. Reinforced significance of not missing OP HD appointments. OP f/u w/ PCP for continued optimization of meds. Acute HFpEF exacerbation: TTE (2022) EF 60% w/ mod LVOT obstruction and dilated LA/RA and sig LV hypertrophy. Initial BNP [id number]. CXR () w/ cardiopulm congestion. HD per nephro. Strict I/Os, daily weight, 2L fluid restriction. ESRD on HD TThS: follows w/ Dr. Brady Chol. Missed HD Thursday. S/p HD  w/ improvements. Discussed w/ Nephrology; they are okay w/ discharge when medically stable. Cont OP HD as scheduled. Hemoptysis, improved: likely viral bronchitis as afebrile and no leukocytosis. Will defer abx at this time. Maintain adequate hydration and diet. Tylenol prn. Monitor. Chronic Macrocytic Anemia: likely secondary to ESRD. No signs of occult bleed. Trended CBC; transfuse for Hgb<7. Allergic Rhinitis: continued home Flonase. Continued Cetrizine 10mg daily in place of home Hydroxyzine TID. HLD: continued ome statin  GERD: continued home Pepcid  Depression / PTSD: continued home Zoloft  Hx REZA: no active therapies. Controlled w/ weight loss  Hx TAA: s/p stent placement  Acute on Chronic Hypoxic Respiratory Failure, resolved: likely multifactorial secondary to pulm edema vs viral bronchitis. Baseline 4L NC, required HFNC on admit.  Now back to baseline. HD as scheduled. IS/Acapella, pulm hygiene. Non-cardiogenic Pulm Edema Secondary to ESRD and missed HD (POA), resolved: Discussed w/ Nephrology; HD as scheduled. BP management. Breathing has improved at this time. Continue to monitor. Hyperkalemia, resolved: likely secondary to ESRD w/ missed HD. S/p calcium, bicarb, lasix in ED. S/p HD  w/ improvement. Hypertensive Urgency, resolved: likely secondary to missed HD. Improved after HD 2023. Hospital Course:   Ann Marie Chavez is a 66 y.o. male with PMHx HFpEF, HTN, HLD, TAA (s/p stenting), Chronic Resp Failure (baseline 4L NC), ESRD on HD TThS, GERD, Depression, TPSD, REZA, allergic rhinits, cocaine abuse  admitted to [facility] on 2023 for SOB. Patient missed HD Thursday due to attending a . Endorsed fevers, chills, productive cough requiring HFNC in ED. Does note that fevers/chills have broken a few days ago. LA and WBC WNL. BNP elevated. CXR revealed cardiopulm congestion and Nephrology consulted for HD. Reports medical compliance but BP runs high at home and are very labile. Admitted to step-down for further management. Breathing improved, weaned down to baseline 4L. Titrated BP medications w/ limited improvement likely due to ESRD w/ persistent elevated baseline. Patient doing well this morning, resting comfortably during HD. Denied new complaints. Patient wishes to go home. Denied any adverse effects from medication changes. BP remains elevated and labile consistent w/ previous readings this admission. Discussed w/ Nephrology; okay with discharge on current medications w/ continued scheduled HD. Reinforced sig of not missing HD appointments. Patient discharge w/ PCP f/u for further BP management. The patient was seen and examined on day of discharge and this discharge summary is in conjunction with any daily progress note from day of discharge. The patient is discharged in stable condition. Exam:   Vitals:  Vitals:    02/07/23 0330 02/07/23 0615 02/07/23 0725 02/07/23 1200   BP: (!) 165/74 (!) 175/84 (!) 126/59 (!) 185/91   Pulse: 83  75 78   Resp: 16  22 20   Temp: 98 °F (36.7 °C)  97.6 °F (36.4 °C) 98.4 °F (36.9 °C)   TempSrc: Oral      SpO2: 95%  93% 94%   Weight:   225 lb 8.5 oz (102.3 kg) 238 lb 12.1 oz (108.3 kg)   Height:         Weight: Weight: 238 lb 12.1 oz (108.3 kg)     General appearance: No apparent distress, well developed, appears stated age. Eyes:  Pupils equal, round, and reactive to light. Conjunctivae/corneas clear. HENT: Head normal in appearance. External nares normal.  Oral mucosa moist without lesions. Hearing grossly intact. Neck: Supple, with full range of motion. Trachea midline. No gross JVD appreciated. Respiratory:  Normal respiratory effort. Clear to auscultation, bilaterally without rales or wheezes or rhonchi. Cardiovascular: Normal rate, regular rhythm with normal S1/S2 without murmurs. No lower extremity edema. Abdomen: Soft, non-tender, non-distended with normal bowel sounds. Musculoskeletal: There is no joint swelling or tenderness. Normal tone. No abnormal movements. Skin: Warm and dry. No rashes or lesions. Neurologic:  No focal sensory/motor deficits in the upper and lower extremities. Cranial nerves:  grossly non-focal 2-12. Psychiatric: Alert and oriented, normal insight and thought content. Capillary Refill: Brisk,< 3 seconds. Peripheral Pulses: +2 palpable, equal bilaterally. Labs:  For convenience the most recent labs are provided:  CBC:    Lab Results   Component Value Date/Time    WBC 3.6 02/07/2023 04:18 AM    HGB 9.7 02/07/2023 04:18 AM    HCT 33.3 02/07/2023 04:18 AM    PLT 78 02/07/2023 04:18 AM     Renal:    Lab Results   Component Value Date/Time     02/07/2023 04:18 AM    K 5.6 02/07/2023 04:18 AM    K 5.9 02/04/2023 06:05 AM    CL 98 02/07/2023 04:18 AM    CO2 18 02/07/2023 04:18 AM    BUN 76 02/07/2023 04:18 AM    CREATININE 12.7 02/07/2023 04:18 AM    CALCIUM 8.7 02/07/2023 04:18 AM    PHOS 5.1 02/07/2023 04:18 AM     Liver:   Lab Results   Component Value Date/Time    AST 15 10/20/2022 11:10 AM    ALT <5 10/20/2022 11:10 AM         Significant Diagnostic Studies    Radiology:   XR CHEST (2 VW)   Final Result   1. Cardiomegaly with small pleural effusions and hazy bibasilar airspace    opacities which may indicate edema or pneumonia. This document has been electronically signed by: Stan Mckeon MD on    02/04/2023 06:37 AM             Consults:   IP CONSULT TO NEPHROLOGY    Disposition: Home  Condition at Discharge: Stable    Code Status:  Full Code     Patient Instructions:    Discharge lab work: NA  Activity: activity as tolerated  Diet: ADULT DIET; Regular; Low Sodium (2 gm); Low Potassium (Less than 3000 mg/day); Low Phosphorus (Less than 1000 mg); 2000 ml      Follow-up visits:   Roxana Narayan, [facility]  [address] [phone]    Schedule an appointment as soon as possible for a visit on 2/13/2023  hospital f/u, your appointment time is at 11:00a, Please arrive 15mins early, Bring insurance card & Photo ID, co-pay, medication bottles & completed forms.          Discharge Medications:      Medication List        START taking these medications      cetirizine 10 MG tablet  Commonly known as: ZYRTEC  Take 1 tablet by mouth daily  Notes to patient: For allergy symptoms     labetalol 100 MG tablet  Commonly known as: NORMODYNE  Take 0.5 tablets by mouth 2 times daily  Notes to patient: Lowers blood pressure            CHANGE how you take these medications      cloNIDine 0.2 MG tablet  Commonly known as: CATAPRES  Take 1 tablet by mouth 3 times daily  What changed:   medication strength  how much to take     doxazosin 8 MG tablet  Commonly known as: CARDURA  Take 1 tablet by mouth every 12 hours  What changed:   medication strength  how much to take     ferrous sulfate 325 (65 Fe) MG EC tablet  Commonly known as: FE TABS 325  Take 1 tablet by mouth 3 times daily (with meals)  What changed: when to take this  Notes to patient: May purchase over-the-counter without a prescription     isosorbide mononitrate 120 MG extended release tablet  Commonly known as: IMDUR  Take 1 tablet by mouth in the morning and at bedtime  What changed:   medication strength  how much to take            CONTINUE taking these medications      albuterol sulfate  (90 Base) MCG/ACT inhaler  Commonly known as: PROVENTIL;VENTOLIN;PROAIR  INHALE 2 PUFFS INTO THE LUNGS EVERY 6 HOURS AS NEEDED FOR WHEEZING OR SHORTNESS OF BREATH     aspirin 81 MG EC tablet  Take 1 tablet by mouth daily     atorvastatin 80 MG tablet  Commonly known as: LIPITOR     calcitRIOL 0.25 MCG capsule  Commonly known as: ROCALTROL  Take 9 capsules by mouth three times a week     cinacalcet 30 MG tablet  Commonly known as: SENSIPAR  Take 5 tablets by mouth three times a week     DIALYVITE 800-ZINC 15 PO     famotidine 20 MG tablet  Commonly known as: PEPCID  Take 1 tablet by mouth daily     fluticasone 50 MCG/ACT nasal spray  Commonly known as: FLONASE     folic acid 963 MCG tablet  Commonly known as: FOLVITE     nitroGLYCERIN 0.4 MG SL tablet  Commonly known as: NITROSTAT  up to max of 3 total doses.  If no relief after 1 dose, call 911.     sertraline 100 MG tablet  Commonly known as: ZOLOFT     therapeutic multivitamin-minerals tablet     traZODone 50 MG tablet  Commonly known as: DESYREL  Take 1 tablet by mouth nightly as needed for Sleep     Velphoro 500 MG Chew chewable tablet  Generic drug: sucroferric oxyhydroxide     verapamil 240 MG extended release tablet  Commonly known as: CALAN SR     vitamin D 25 MCG (1000 UT) Tabs tablet  Commonly known as: CHOLECALCIFEROL     vitamin E 400 UNIT capsule            STOP taking these medications      acetaminophen 325 MG tablet  Commonly known as: TYLENOL     hydrOXYzine HCl 50 MG tablet  Commonly known as: ATARAX               Where to Get Your Medications        These medications were sent to [address], [address]  [address], [place] [address]      Phone: [phone]   cetirizine 10 MG tablet  cloNIDine 0.2 MG tablet  doxazosin 8 MG tablet  isosorbide mononitrate 120 MG extended release tablet  labetalol 100 MG tablet            Time Spent on discharge is 30 minutes in the examination, evaluation, counseling and review of medications and discharge plan. Thank you WADE Roldan - MEL for the opportunity to be involved in this patient's care.       Signed:    Electronically signed by Ashli Ace DO on 2/7/23 at 3:06 PM EST     Case was discussed with Attending, Dr. Slim Wolf MD.

## 2023-04-01 ENCOUNTER — APPOINTMENT (OUTPATIENT)
Dept: GENERAL RADIOLOGY | Age: 58
DRG: 640 | End: 2023-04-01
Payer: MEDICARE

## 2023-04-01 ENCOUNTER — HOSPITAL ENCOUNTER (INPATIENT)
Age: 58
LOS: 2 days | Discharge: HOME HEALTH | DRG: 640 | End: 2023-04-03
Attending: HOSPITALIST | Admitting: HOSPITALIST
Payer: MEDICARE

## 2023-04-01 DIAGNOSIS — R09.02 HYPOXEMIA: ICD-10-CM

## 2023-04-01 DIAGNOSIS — E87.5 HYPERKALEMIA: Primary | ICD-10-CM

## 2023-04-01 DIAGNOSIS — J90 PLEURAL EFFUSION: ICD-10-CM

## 2023-04-01 PROBLEM — N18.6 ESRD NEEDING DIALYSIS (HCC): Status: ACTIVE | Noted: 2023-04-01

## 2023-04-01 PROBLEM — Z99.2 ESRD NEEDING DIALYSIS (HCC): Status: ACTIVE | Noted: 2023-04-01

## 2023-04-01 LAB
ANION GAP SERPL CALC-SCNC: 18 MEQ/L (ref 8–16)
BASOPHILS ABSOLUTE: 0 THOU/MM3 (ref 0–0.1)
BASOPHILS ABSOLUTE: 0 THOU/MM3 (ref 0–0.1)
BASOPHILS NFR BLD AUTO: 0.5 %
BASOPHILS NFR BLD AUTO: 0.6 %
BUN SERPL-MCNC: 94 MG/DL (ref 7–22)
CALCIUM SERPL-MCNC: 9.2 MG/DL (ref 8.5–10.5)
CHLORIDE SERPL-SCNC: 98 MEQ/L (ref 98–111)
CO2 SERPL-SCNC: 22 MEQ/L (ref 23–33)
CREAT SERPL-MCNC: 13.4 MG/DL (ref 0.4–1.2)
DEPRECATED RDW RBC AUTO: 54.4 FL (ref 35–45)
DEPRECATED RDW RBC AUTO: 56.3 FL (ref 35–45)
EOSINOPHIL NFR BLD AUTO: 0.2 %
EOSINOPHIL NFR BLD AUTO: 1.3 %
EOSINOPHILS ABSOLUTE: 0 THOU/MM3 (ref 0–0.4)
EOSINOPHILS ABSOLUTE: 0.1 THOU/MM3 (ref 0–0.4)
ERYTHROCYTE [DISTWIDTH] IN BLOOD BY AUTOMATED COUNT: 15 % (ref 11.5–14.5)
ERYTHROCYTE [DISTWIDTH] IN BLOOD BY AUTOMATED COUNT: 15 % (ref 11.5–14.5)
ETHANOL SERPL-MCNC: < 0.01 %
FLUAV RNA RESP QL NAA+PROBE: NOT DETECTED
FLUBV RNA RESP QL NAA+PROBE: NOT DETECTED
GFR SERPL CREATININE-BSD FRML MDRD: 4 ML/MIN/1.73M2
GLUCOSE BLD STRIP.AUTO-MCNC: 79 MG/DL (ref 70–108)
GLUCOSE SERPL-MCNC: 96 MG/DL (ref 70–108)
HCT VFR BLD AUTO: 31.8 % (ref 42–52)
HCT VFR BLD AUTO: 36.2 % (ref 42–52)
HGB BLD-MCNC: 10.7 GM/DL (ref 14–18)
HGB BLD-MCNC: 9.9 GM/DL (ref 14–18)
IMM GRANULOCYTES # BLD AUTO: 0.02 THOU/MM3 (ref 0–0.07)
IMM GRANULOCYTES # BLD AUTO: 0.02 THOU/MM3 (ref 0–0.07)
IMM GRANULOCYTES NFR BLD AUTO: 0.2 %
IMM GRANULOCYTES NFR BLD AUTO: 0.3 %
LACTIC ACID, SEPSIS: 1 MMOL/L (ref 0.5–1.9)
LYMPHOCYTES ABSOLUTE: 0.6 THOU/MM3 (ref 1–4.8)
LYMPHOCYTES ABSOLUTE: 0.9 THOU/MM3 (ref 1–4.8)
LYMPHOCYTES NFR BLD AUTO: 14 %
LYMPHOCYTES NFR BLD AUTO: 7.3 %
MAGNESIUM SERPL-MCNC: 2.7 MG/DL (ref 1.6–2.4)
MCH RBC QN AUTO: 30.4 PG (ref 26–33)
MCH RBC QN AUTO: 31 PG (ref 26–33)
MCHC RBC AUTO-ENTMCNC: 29.6 GM/DL (ref 32.2–35.5)
MCHC RBC AUTO-ENTMCNC: 31.1 GM/DL (ref 32.2–35.5)
MCV RBC AUTO: 102.8 FL (ref 80–94)
MCV RBC AUTO: 99.7 FL (ref 80–94)
MONOCYTES ABSOLUTE: 0.4 THOU/MM3 (ref 0.4–1.3)
MONOCYTES ABSOLUTE: 0.5 THOU/MM3 (ref 0.4–1.3)
MONOCYTES NFR BLD AUTO: 5.4 %
MONOCYTES NFR BLD AUTO: 8 %
NEUTROPHILS NFR BLD AUTO: 75.8 %
NEUTROPHILS NFR BLD AUTO: 86.4 %
NRBC BLD AUTO-RTO: 0 /100 WBC
NRBC BLD AUTO-RTO: 0 /100 WBC
NT-PROBNP SERPL IA-MCNC: ABNORMAL PG/ML (ref 0–124)
OSMOLALITY SERPL CALC.SUM OF ELEC: 304.6 MOSMOL/KG (ref 275–300)
PLATELET # BLD AUTO: 111 THOU/MM3 (ref 130–400)
PLATELET # BLD AUTO: 114 THOU/MM3 (ref 130–400)
PMV BLD AUTO: 10 FL (ref 9.4–12.4)
PMV BLD AUTO: 9.7 FL (ref 9.4–12.4)
POTASSIUM SERPL-SCNC: 7.3 MEQ/L (ref 3.5–5.2)
PROCALCITONIN SERPL IA-MCNC: 1.06 NG/ML (ref 0.01–0.09)
RBC # BLD AUTO: 3.19 MILL/MM3 (ref 4.7–6.1)
RBC # BLD AUTO: 3.52 MILL/MM3 (ref 4.7–6.1)
SARS-COV-2 RNA RESP QL NAA+PROBE: NOT DETECTED
SEGMENTED NEUTROPHILS ABSOLUTE COUNT: 4.7 THOU/MM3 (ref 1.8–7.7)
SEGMENTED NEUTROPHILS ABSOLUTE COUNT: 7.1 THOU/MM3 (ref 1.8–7.7)
SODIUM SERPL-SCNC: 138 MEQ/L (ref 135–145)
TROPONIN T: 0.11 NG/ML
WBC # BLD AUTO: 6.2 THOU/MM3 (ref 4.8–10.8)
WBC # BLD AUTO: 8.2 THOU/MM3 (ref 4.8–10.8)

## 2023-04-01 PROCEDURE — 83605 ASSAY OF LACTIC ACID: CPT

## 2023-04-01 PROCEDURE — 93005 ELECTROCARDIOGRAM TRACING: CPT | Performed by: PHYSICIAN ASSISTANT

## 2023-04-01 PROCEDURE — 36415 COLL VENOUS BLD VENIPUNCTURE: CPT

## 2023-04-01 PROCEDURE — 83735 ASSAY OF MAGNESIUM: CPT

## 2023-04-01 PROCEDURE — 2700000000 HC OXYGEN THERAPY PER DAY

## 2023-04-01 PROCEDURE — 6370000000 HC RX 637 (ALT 250 FOR IP): Performed by: PHYSICIAN ASSISTANT

## 2023-04-01 PROCEDURE — 96374 THER/PROPH/DIAG INJ IV PUSH: CPT

## 2023-04-01 PROCEDURE — 2580000003 HC RX 258: Performed by: STUDENT IN AN ORGANIZED HEALTH CARE EDUCATION/TRAINING PROGRAM

## 2023-04-01 PROCEDURE — 99222 1ST HOSP IP/OBS MODERATE 55: CPT | Performed by: INTERNAL MEDICINE

## 2023-04-01 PROCEDURE — 90935 HEMODIALYSIS ONE EVALUATION: CPT | Performed by: INTERNAL MEDICINE

## 2023-04-01 PROCEDURE — 83880 ASSAY OF NATRIURETIC PEPTIDE: CPT

## 2023-04-01 PROCEDURE — 71046 X-RAY EXAM CHEST 2 VIEWS: CPT

## 2023-04-01 PROCEDURE — 5A1D70Z PERFORMANCE OF URINARY FILTRATION, INTERMITTENT, LESS THAN 6 HOURS PER DAY: ICD-10-PCS | Performed by: INTERNAL MEDICINE

## 2023-04-01 PROCEDURE — 99285 EMERGENCY DEPT VISIT HI MDM: CPT

## 2023-04-01 PROCEDURE — 2580000003 HC RX 258: Performed by: PHYSICIAN ASSISTANT

## 2023-04-01 PROCEDURE — 90935 HEMODIALYSIS ONE EVALUATION: CPT

## 2023-04-01 PROCEDURE — 6370000000 HC RX 637 (ALT 250 FOR IP): Performed by: STUDENT IN AN ORGANIZED HEALTH CARE EDUCATION/TRAINING PROGRAM

## 2023-04-01 PROCEDURE — 6360000002 HC RX W HCPCS: Performed by: STUDENT IN AN ORGANIZED HEALTH CARE EDUCATION/TRAINING PROGRAM

## 2023-04-01 PROCEDURE — 85025 COMPLETE CBC W/AUTO DIFF WBC: CPT

## 2023-04-01 PROCEDURE — 6360000002 HC RX W HCPCS: Performed by: PHYSICIAN ASSISTANT

## 2023-04-01 PROCEDURE — 93005 ELECTROCARDIOGRAM TRACING: CPT | Performed by: STUDENT IN AN ORGANIZED HEALTH CARE EDUCATION/TRAINING PROGRAM

## 2023-04-01 PROCEDURE — 87636 SARSCOV2 & INF A&B AMP PRB: CPT

## 2023-04-01 PROCEDURE — 94640 AIRWAY INHALATION TREATMENT: CPT

## 2023-04-01 PROCEDURE — 82077 ASSAY SPEC XCP UR&BREATH IA: CPT

## 2023-04-01 PROCEDURE — 84145 PROCALCITONIN (PCT): CPT

## 2023-04-01 PROCEDURE — 84484 ASSAY OF TROPONIN QUANT: CPT

## 2023-04-01 PROCEDURE — 87040 BLOOD CULTURE FOR BACTERIA: CPT

## 2023-04-01 PROCEDURE — 80048 BASIC METABOLIC PNL TOTAL CA: CPT

## 2023-04-01 PROCEDURE — 94761 N-INVAS EAR/PLS OXIMETRY MLT: CPT

## 2023-04-01 PROCEDURE — 2060000000 HC ICU INTERMEDIATE R&B

## 2023-04-01 PROCEDURE — 82948 REAGENT STRIP/BLOOD GLUCOSE: CPT

## 2023-04-01 PROCEDURE — 99223 1ST HOSP IP/OBS HIGH 75: CPT | Performed by: HOSPITALIST

## 2023-04-01 PROCEDURE — 2500000003 HC RX 250 WO HCPCS: Performed by: PHYSICIAN ASSISTANT

## 2023-04-01 RX ORDER — FERROUS SULFATE 325(65) MG
325 TABLET ORAL
Status: DISCONTINUED | OUTPATIENT
Start: 2023-04-02 | End: 2023-04-03 | Stop reason: HOSPADM

## 2023-04-01 RX ORDER — ONDANSETRON 4 MG/1
4 TABLET, ORALLY DISINTEGRATING ORAL EVERY 8 HOURS PRN
Status: DISCONTINUED | OUTPATIENT
Start: 2023-04-01 | End: 2023-04-03 | Stop reason: HOSPADM

## 2023-04-01 RX ORDER — SODIUM CHLORIDE 0.9 % (FLUSH) 0.9 %
5-40 SYRINGE (ML) INJECTION PRN
Status: DISCONTINUED | OUTPATIENT
Start: 2023-04-01 | End: 2023-04-03 | Stop reason: HOSPADM

## 2023-04-01 RX ORDER — VITAMIN E 268 MG
400 CAPSULE ORAL DAILY
Status: DISCONTINUED | OUTPATIENT
Start: 2023-04-01 | End: 2023-04-03 | Stop reason: HOSPADM

## 2023-04-01 RX ORDER — VERAPAMIL HYDROCHLORIDE 240 MG/1
240 TABLET, FILM COATED, EXTENDED RELEASE ORAL DAILY
Status: DISCONTINUED | OUTPATIENT
Start: 2023-04-01 | End: 2023-04-03 | Stop reason: HOSPADM

## 2023-04-01 RX ORDER — ASPIRIN 81 MG/1
81 TABLET ORAL DAILY
Status: DISCONTINUED | OUTPATIENT
Start: 2023-04-02 | End: 2023-04-03 | Stop reason: HOSPADM

## 2023-04-01 RX ORDER — HYDRALAZINE HYDROCHLORIDE 20 MG/ML
5 INJECTION INTRAMUSCULAR; INTRAVENOUS ONCE
Status: COMPLETED | OUTPATIENT
Start: 2023-04-01 | End: 2023-04-01

## 2023-04-01 RX ORDER — ALBUTEROL SULFATE 2.5 MG/3ML
2.5 SOLUTION RESPIRATORY (INHALATION) ONCE
Status: COMPLETED | OUTPATIENT
Start: 2023-04-01 | End: 2023-04-01

## 2023-04-01 RX ORDER — CALCIUM GLUCONATE 94 MG/ML
1000 INJECTION, SOLUTION INTRAVENOUS ONCE
Status: COMPLETED | OUTPATIENT
Start: 2023-04-01 | End: 2023-04-01

## 2023-04-01 RX ORDER — LABETALOL 100 MG/1
50 TABLET, FILM COATED ORAL 2 TIMES DAILY
Status: DISCONTINUED | OUTPATIENT
Start: 2023-04-01 | End: 2023-04-03 | Stop reason: HOSPADM

## 2023-04-01 RX ORDER — ISOSORBIDE MONONITRATE 60 MG/1
120 TABLET, EXTENDED RELEASE ORAL 2 TIMES DAILY
Status: DISCONTINUED | OUTPATIENT
Start: 2023-04-01 | End: 2023-04-03 | Stop reason: HOSPADM

## 2023-04-01 RX ORDER — DOXAZOSIN MESYLATE 4 MG/1
8 TABLET ORAL EVERY 12 HOURS SCHEDULED
Status: DISCONTINUED | OUTPATIENT
Start: 2023-04-01 | End: 2023-04-03 | Stop reason: HOSPADM

## 2023-04-01 RX ORDER — SODIUM CHLORIDE 0.9 % (FLUSH) 0.9 %
5-40 SYRINGE (ML) INJECTION EVERY 12 HOURS SCHEDULED
Status: DISCONTINUED | OUTPATIENT
Start: 2023-04-01 | End: 2023-04-03 | Stop reason: HOSPADM

## 2023-04-01 RX ORDER — ALBUTEROL SULFATE 90 UG/1
2 AEROSOL, METERED RESPIRATORY (INHALATION) EVERY 6 HOURS PRN
Status: DISCONTINUED | OUTPATIENT
Start: 2023-04-01 | End: 2023-04-03 | Stop reason: HOSPADM

## 2023-04-01 RX ORDER — FOLIC ACID 1 MG/1
1000 TABLET ORAL DAILY
Status: DISCONTINUED | OUTPATIENT
Start: 2023-04-01 | End: 2023-04-03 | Stop reason: HOSPADM

## 2023-04-01 RX ORDER — POLYETHYLENE GLYCOL 3350 17 G/17G
17 POWDER, FOR SOLUTION ORAL DAILY PRN
Status: DISCONTINUED | OUTPATIENT
Start: 2023-04-01 | End: 2023-04-03 | Stop reason: HOSPADM

## 2023-04-01 RX ORDER — ONDANSETRON 2 MG/ML
4 INJECTION INTRAMUSCULAR; INTRAVENOUS EVERY 6 HOURS PRN
Status: DISCONTINUED | OUTPATIENT
Start: 2023-04-01 | End: 2023-04-03 | Stop reason: HOSPADM

## 2023-04-01 RX ORDER — VITAMIN B COMPLEX
1000 TABLET ORAL 2 TIMES DAILY
Status: DISCONTINUED | OUTPATIENT
Start: 2023-04-01 | End: 2023-04-03 | Stop reason: HOSPADM

## 2023-04-01 RX ORDER — FLUTICASONE PROPIONATE 50 MCG
1 SPRAY, SUSPENSION (ML) NASAL DAILY
Status: DISCONTINUED | OUTPATIENT
Start: 2023-04-01 | End: 2023-04-03 | Stop reason: HOSPADM

## 2023-04-01 RX ORDER — CALCITRIOL 0.25 UG/1
2.25 CAPSULE, LIQUID FILLED ORAL
Status: DISCONTINUED | OUTPATIENT
Start: 2023-04-03 | End: 2023-04-03 | Stop reason: HOSPADM

## 2023-04-01 RX ORDER — TRAZODONE HYDROCHLORIDE 50 MG/1
50 TABLET ORAL NIGHTLY PRN
Status: DISCONTINUED | OUTPATIENT
Start: 2023-04-01 | End: 2023-04-03 | Stop reason: HOSPADM

## 2023-04-01 RX ORDER — ASPIRIN 81 MG/1
81 TABLET ORAL DAILY
Status: DISCONTINUED | OUTPATIENT
Start: 2023-04-01 | End: 2023-04-01

## 2023-04-01 RX ORDER — CLONIDINE HYDROCHLORIDE 0.2 MG/1
0.2 TABLET ORAL 3 TIMES DAILY
Status: DISCONTINUED | OUTPATIENT
Start: 2023-04-01 | End: 2023-04-03 | Stop reason: HOSPADM

## 2023-04-01 RX ORDER — ACETAMINOPHEN 325 MG/1
650 TABLET ORAL EVERY 6 HOURS PRN
Status: DISCONTINUED | OUTPATIENT
Start: 2023-04-01 | End: 2023-04-03 | Stop reason: HOSPADM

## 2023-04-01 RX ORDER — ATORVASTATIN CALCIUM 80 MG/1
80 TABLET, FILM COATED ORAL NIGHTLY
Status: DISCONTINUED | OUTPATIENT
Start: 2023-04-01 | End: 2023-04-03 | Stop reason: HOSPADM

## 2023-04-01 RX ORDER — SODIUM CHLORIDE 9 MG/ML
INJECTION, SOLUTION INTRAVENOUS PRN
Status: DISCONTINUED | OUTPATIENT
Start: 2023-04-01 | End: 2023-04-03 | Stop reason: HOSPADM

## 2023-04-01 RX ORDER — CINACALCET 30 MG/1
150 TABLET, FILM COATED ORAL
Status: DISCONTINUED | OUTPATIENT
Start: 2023-04-03 | End: 2023-04-03 | Stop reason: HOSPADM

## 2023-04-01 RX ORDER — HEPARIN SODIUM 5000 [USP'U]/ML
5000 INJECTION, SOLUTION INTRAVENOUS; SUBCUTANEOUS EVERY 8 HOURS SCHEDULED
Status: DISCONTINUED | OUTPATIENT
Start: 2023-04-01 | End: 2023-04-03 | Stop reason: HOSPADM

## 2023-04-01 RX ORDER — FAMOTIDINE 20 MG/1
20 TABLET, FILM COATED ORAL DAILY
Status: DISCONTINUED | OUTPATIENT
Start: 2023-04-01 | End: 2023-04-03 | Stop reason: HOSPADM

## 2023-04-01 RX ORDER — ACETAMINOPHEN 650 MG/1
650 SUPPOSITORY RECTAL EVERY 6 HOURS PRN
Status: DISCONTINUED | OUTPATIENT
Start: 2023-04-01 | End: 2023-04-03 | Stop reason: HOSPADM

## 2023-04-01 RX ORDER — SERTRALINE HYDROCHLORIDE 100 MG/1
100 TABLET, FILM COATED ORAL DAILY
Status: DISCONTINUED | OUTPATIENT
Start: 2023-04-01 | End: 2023-04-03 | Stop reason: HOSPADM

## 2023-04-01 RX ADMIN — FOLIC ACID 1000 MCG: 1 TABLET ORAL at 23:21

## 2023-04-01 RX ADMIN — DOXAZOSIN 8 MG: 4 TABLET ORAL at 19:30

## 2023-04-01 RX ADMIN — Medication 400 UNITS: at 23:21

## 2023-04-01 RX ADMIN — CEFTRIAXONE SODIUM 1000 MG: 1 INJECTION, POWDER, FOR SOLUTION INTRAMUSCULAR; INTRAVENOUS at 15:50

## 2023-04-01 RX ADMIN — ALBUTEROL SULFATE 2.5 MG: 2.5 SOLUTION RESPIRATORY (INHALATION) at 15:46

## 2023-04-01 RX ADMIN — HYDRALAZINE HYDROCHLORIDE 5 MG: 20 INJECTION INTRAMUSCULAR; INTRAVENOUS at 15:31

## 2023-04-01 RX ADMIN — ISOSORBIDE MONONITRATE 120 MG: 60 TABLET, EXTENDED RELEASE ORAL at 19:30

## 2023-04-01 RX ADMIN — CLONIDINE HYDROCHLORIDE 0.2 MG: 0.2 TABLET ORAL at 23:20

## 2023-04-01 RX ADMIN — LABETALOL HYDROCHLORIDE 50 MG: 100 TABLET, FILM COATED ORAL at 21:54

## 2023-04-01 RX ADMIN — VERAPAMIL HYDROCHLORIDE 240 MG: 240 TABLET, FILM COATED, EXTENDED RELEASE ORAL at 18:09

## 2023-04-01 RX ADMIN — CALCIUM GLUCONATE 1000 MG: 98 INJECTION, SOLUTION INTRAVENOUS at 15:50

## 2023-04-01 RX ADMIN — SERTRALINE 100 MG: 100 TABLET, FILM COATED ORAL at 23:21

## 2023-04-01 RX ADMIN — ATORVASTATIN CALCIUM 80 MG: 80 TABLET, FILM COATED ORAL at 21:54

## 2023-04-01 RX ADMIN — HEPARIN SODIUM 5000 UNITS: 5000 INJECTION INTRAVENOUS; SUBCUTANEOUS at 21:52

## 2023-04-01 RX ADMIN — SODIUM ZIRCONIUM CYCLOSILICATE 10 G: 10 POWDER, FOR SUSPENSION ORAL at 15:28

## 2023-04-01 RX ADMIN — FLUTICASONE PROPIONATE 1 SPRAY: 50 SPRAY, METERED NASAL at 23:23

## 2023-04-01 RX ADMIN — Medication 1000 UNITS: at 23:21

## 2023-04-01 RX ADMIN — FAMOTIDINE 20 MG: 20 TABLET, FILM COATED ORAL at 23:41

## 2023-04-01 RX ADMIN — SODIUM CHLORIDE, PRESERVATIVE FREE 10 ML: 5 INJECTION INTRAVENOUS at 22:34

## 2023-04-01 RX ADMIN — SODIUM BICARBONATE 25 MEQ: 84 INJECTION, SOLUTION INTRAVENOUS at 15:41

## 2023-04-01 ASSESSMENT — PAIN - FUNCTIONAL ASSESSMENT
PAIN_FUNCTIONAL_ASSESSMENT: NONE - DENIES PAIN
PAIN_FUNCTIONAL_ASSESSMENT: NONE - DENIES PAIN
PAIN_FUNCTIONAL_ASSESSMENT: ACTIVITIES ARE NOT PREVENTED
PAIN_FUNCTIONAL_ASSESSMENT: NONE - DENIES PAIN

## 2023-04-01 ASSESSMENT — ENCOUNTER SYMPTOMS
COUGH: 1
DIARRHEA: 0
EYES NEGATIVE: 1
SORE THROAT: 0
VOMITING: 0
ALLERGIC/IMMUNOLOGIC NEGATIVE: 1
SHORTNESS OF BREATH: 1
GASTROINTESTINAL NEGATIVE: 1
NAUSEA: 0

## 2023-04-01 ASSESSMENT — PAIN DESCRIPTION - ONSET: ONSET: PROGRESSIVE

## 2023-04-01 ASSESSMENT — PAIN SCALES - GENERAL
PAINLEVEL_OUTOF10: 4
PAINLEVEL_OUTOF10: 0

## 2023-04-01 ASSESSMENT — PAIN DESCRIPTION - LOCATION: LOCATION: ABDOMEN

## 2023-04-01 ASSESSMENT — PAIN DESCRIPTION - DESCRIPTORS: DESCRIPTORS: ACHING;CRAMPING

## 2023-04-01 ASSESSMENT — PAIN DESCRIPTION - PAIN TYPE: TYPE: ACUTE PAIN

## 2023-04-01 ASSESSMENT — PAIN DESCRIPTION - FREQUENCY: FREQUENCY: INTERMITTENT

## 2023-04-01 ASSESSMENT — PAIN DESCRIPTION - ORIENTATION: ORIENTATION: OTHER (COMMENT)

## 2023-04-01 NOTE — ED NOTES
Pt back to room at this time from [facility], [facility]  04/01/23 4873

## 2023-04-01 NOTE — ED NOTES
Pt leaving for dialysis at this time.  Wife informed of bed assignment and heading up      Velasquez Galeas RN  04/01/23 1600

## 2023-04-01 NOTE — ED TRIAGE NOTES
Pt to ED with wife with c/o addiction problem. Pt states he's been going through a stressful time and he relapsed this morning on cocaine and alcohol. States he would like help to quit. Pt 84% on RA. Pt states he had lung surgery in October and should be on 3L nc. Pt placed on 3L and O2 was 88%. Pt placed on 4L and O2 is now 94%. Pt reports he is a dialysis pt and has missed two appointments this week.

## 2023-04-01 NOTE — ED NOTES
Pt placed on 6L nc by respiratory. Pt was having difficult time breathing and O2 was picking up at 88%.  Pt now at 96% on 6L nc      Sim Hutching, [place]  04/01/23 9688

## 2023-04-01 NOTE — CONSULTS
[facility]    [address]  4/1/2023 5:21 PM    Pt Name:    Mckayla Zimmer  MRN:     [id number]   [id number]  YOB: 1965  Admit Date:    4/1/2023  1:42 PM  Primary Care Physician:  WADE Samuel CNP    [facility] Number:   [id number]    Reason for Consult:  ESRD on hemodialysis, hyperkalemia  Requesting provider:  Emergency room    History:   The patient is a 66 y.o. -American male with history of ESRD on hemodialysis, hypertension, chronic volume overload secondary to dietary noncompliance, substance use, anemia in ESRD who presented to the hospital with complaints of shortness of breath with weight gain and lower extremity swelling. Patient reports he missed dialysis treatment on Thursday. Reports he has been using illicit drugs. Reports shortness of breath is worse with minimal exertion or activity. No alleviating factors. In the emergency room patient noted to be hypoxic on room air. Oxygenation improved with nasal cannula. Patient noted to have critical hyperkalemia with potassium of 7.3. Chest x-ray showed pleural effusion. Blood pressure was in systolic 431H. Nephrology consulted from ER for emergent hemodialysis treatment. Case discussed with ER physician. Patient seen in the dialysis unit    Past Medical History:  Past Medical History:   Diagnosis Date    AAA (abdominal aortic aneurysm) ([address]     NURIS (acute kidney injury) ([facility] 9/24/2015    Anemia associated with chronic renal failure     Arthritis     stated in hands    Cocaine abuse ([address] 5/10/2014    Depression     Diabetes mellitus ([address]     pt states he no longer has diabetes he has lost alot of davion.      FSGS (focal segmental glomerulosclerosis) 5/23/2013    Hemodialysis patient ([address] 10/17/2016    on hemodialysis with [facility] 1/16/2012    History of blood transfusion     Hyperlipidemia

## 2023-04-01 NOTE — ED PROVIDER NOTES
Hyperlipidemia     Hyperphosphatemia 5/21/2016    Hypertension     Left renal artery stenosis ([facility] 5/22/2014    Monoclonal (M) protein disease, multiple 'M' protein     Nicotine dependence 6/16/2014    Noncompliance     Pneumonia     Psychiatric problem     PTSD (post-traumatic stress disorder)     Pt vet from [place]    Secondary hyperparathyroidism (of renal origin)     Sleep apnea        Patient Active Problem List   Diagnosis Code    FSGS (focal segmental glomerulosclerosis) N05.1    Diabetes mellitus type 2, controlled ([facility] E11.9    Monoclonal (M) protein disease, multiple 'M' protein D47.2    Depression F32. A    PTSD (post-traumatic stress disorder) F43.10    Secondary renal hyperparathyroidism ([facility]) N25.81    Cocaine use F14.90    Substance induced mood disorder ([facility]) F19.94    Renal artery stenosis ([facility]) with uncontrollable hypertension I70.1    Chronic alcoholism ([facility]) F10.20    Severe cocaine use disorder ([facility]) F14.20    Uncontrolled hypertension I10    LVH (left ventricular hypertrophy) due to hypertensive disease I11.9    Acute on chronic diastolic CHF (congestive heart failure) ([facility]) I50.33    REZA (obstructive sleep apnea) G47.33    Headache, unspecified headache type R51.9    FH: HTN (hypertension) Z82.49    Hypertrophic cardiomyopathy ([facility] I42.2    Diet-controlled diabetes mellitus ([facility] E11.9    A-V fistula ([facility]) I77.0    Chronic thoracic aortic dissection ([facility]) I71.019    Hyperphosphatemia E83.39    Anemia in CKD (chronic kidney disease) N18.9, D63.1    Vitamin D deficiency E55.9    Thrombocytopenia ([facility]) D69.6    Personality disorder ([facility]) F60.9    Tobacco abuse O46.4    Metabolic acidosis R11.07    Pneumonia due to organism J18.9    Precordial pain R07.2    ESRD on hemodialysis ([facility]) N18.6, Z99.2    Edema of upper extremity R60.0    ESRD (end stage renal disease) ([facility]) N18.6    Hyperglycemia R73.9    Hyperkalemia E87.5    Cocaine abuse, continuous - reports spending $100 on Zyngenia

## 2023-04-01 NOTE — ED NOTES
Pt showed up to triage on RA. Pt O2 was 84%. Pt states he is supposed to be on 3L nc. Pt still at 88% on baseline 3L.  Bumped O2 to 4L nc, pt is now at 94%     Afton President, [place]  04/01/23 6673

## 2023-04-01 NOTE — H&P
SURGERY      THORACOTOMY Right 10/10/2022    Right Thoracotomy & Decortication with Partial Lung Resection performed by Suzy Noel MD at [place] [address] Left 07/08/2016    AV Fistula    VASCULAR SURGERY Right 1990    Surgery on Achilles tendon      Medications Prior to Admission:   Current Facility-Administered Medications on File Prior to Encounter   Medication Dose Route Frequency Provider Last Rate Last Admin    HYDROmorphone (DILAUDID) injection 1 mg  1 mg IntraVENous Once Jag Bass MD        lidocaine (LMX) 4 % cream   Topical Once Jag Bass MD        midazolam (VERSED) injection 1 mg  1 mg IntraVENous Once Jag Bass MD         Current Outpatient Medications on File Prior to Encounter   Medication Sig Dispense Refill    cloNIDine (CATAPRES) 0.2 MG tablet Take 1 tablet by mouth 3 times daily 30 tablet 0    doxazosin (CARDURA) 8 MG tablet Take 1 tablet by mouth every 12 hours 60 tablet 0    isosorbide mononitrate (IMDUR) 120 MG extended release tablet Take 1 tablet by mouth in the morning and at bedtime 60 tablet 0    labetalol (NORMODYNE) 100 MG tablet Take 0.5 tablets by mouth 2 times daily 30 tablet 0    albuterol sulfate HFA (PROVENTIL;VENTOLIN;PROAIR) 108 (90 Base) MCG/ACT inhaler INHALE 2 PUFFS INTO THE LUNGS EVERY 6 HOURS AS NEEDED FOR WHEEZING OR SHORTNESS OF BREATH 8.5 g 2    calcitRIOL (ROCALTROL) 0.25 MCG capsule Take 9 capsules by mouth three times a week 30 capsule 0    cinacalcet (SENSIPAR) 30 MG tablet Take 5 tablets by mouth three times a week 30 tablet 0    atorvastatin (LIPITOR) 80 MG tablet TAKE 1/2 TABLET BY MOUTH ONCE DAILY IN THE EVENING      Sucroferric Oxyhydroxide (VELPHORO) 500 MG CHEW Take 500 mg by mouth 4 times daily (before meals and nightly) Take with meals and snacks      Multiple Vitamins-Minerals (THERAPEUTIC MULTIVITAMIN-MINERALS) tablet Take 1 tablet by mouth daily      folic acid (FOLVITE) 000 MCG tablet Take 800 mcg by mouth daily      vitamin E

## 2023-04-01 NOTE — ED NOTES
ED to inpatient nurses report    Chief Complaint   Patient presents with    Addiction Problem      Present to ED from home  LOC: alert and orientated to name, place, date  Vital signs   Vitals:    04/01/23 1347 04/01/23 1441 04/01/23 1546 04/01/23 1552   BP: (!) 128/116   (!) 252/115   Pulse: 92 82  100   Resp: 22 21     Temp: 98.5 °F (36.9 °C)      TempSrc: Oral      SpO2: (!) 84% 96% 96% 95%   Weight: 220 lb (99.8 kg)      Height: 6' 3\" (1.905 m)         Oxygen Baseline 3L nc    Current needs required 6L nc Bipap/Cpap No  LDAs: 20g Right Antecubital  Peripheral IV 04/01/23 Right Forearm (Active)   Site Assessment Clean, dry & intact 04/01/23 1411   Line Status Blood return noted;Specimen collected; Flushed;Normal saline locked 04/01/23 1411   Phlebitis Assessment No symptoms 04/01/23 1411   Infiltration Assessment 0 04/01/23 1411   Dressing Status New dressing applied;Clean, dry & intact 04/01/23 1411   Dressing Type Transparent 04/01/23 1411   Dressing Intervention New 04/01/23 1411     Mobility: Requires assistance * 1  Pending ED orders: None  Present condition: Stable    C-SSRS Risk of Suicide: No Risk  Swallow Screening  PASS  Preferred Language: [place]     Electronically signed by Reshma Colon RN on 4/1/2023 at 4:08 PM           Reshma Colon, [address] [place] [address]  04/01/23 8067

## 2023-04-01 NOTE — ED NOTES
Pt medicated per MAR. Respiratory at bedside doing breathing treatment at this time. Transport here to take pt to inpatient dialysis when complete.       Tamera Muir RN  04/01/23 7461

## 2023-04-02 LAB
ANION GAP SERPL CALC-SCNC: 11 MEQ/L (ref 8–16)
BUN SERPL-MCNC: 41 MG/DL (ref 7–22)
CALCIUM SERPL-MCNC: 8.2 MG/DL (ref 8.5–10.5)
CHLORIDE SERPL-SCNC: 97 MEQ/L (ref 98–111)
CO2 SERPL-SCNC: 31 MEQ/L (ref 23–33)
CREAT SERPL-MCNC: 7.7 MG/DL (ref 0.4–1.2)
DEPRECATED RDW RBC AUTO: 57.5 FL (ref 35–45)
EKG ATRIAL RATE: 105 BPM
EKG ATRIAL RATE: 91 BPM
EKG P AXIS: 53 DEGREES
EKG P AXIS: 62 DEGREES
EKG P-R INTERVAL: 192 MS
EKG P-R INTERVAL: 218 MS
EKG Q-T INTERVAL: 374 MS
EKG Q-T INTERVAL: 406 MS
EKG QRS DURATION: 100 MS
EKG QRS DURATION: 120 MS
EKG QTC CALCULATION (BAZETT): 494 MS
EKG QTC CALCULATION (BAZETT): 499 MS
EKG R AXIS: -30 DEGREES
EKG R AXIS: -43 DEGREES
EKG T AXIS: 85 DEGREES
EKG T AXIS: 93 DEGREES
EKG VENTRICULAR RATE: 105 BPM
EKG VENTRICULAR RATE: 91 BPM
ERYTHROCYTE [DISTWIDTH] IN BLOOD BY AUTOMATED COUNT: 14.9 % (ref 11.5–14.5)
FOLATE SERPL-MCNC: 14.6 NG/ML (ref 4.8–24.2)
GFR SERPL CREATININE-BSD FRML MDRD: 8 ML/MIN/1.73M2
GLUCOSE SERPL-MCNC: 155 MG/DL (ref 70–108)
HCT VFR BLD AUTO: 31.6 % (ref 42–52)
HGB BLD-MCNC: 9.5 GM/DL (ref 14–18)
HGB RETIC QN AUTO: 33.6 PG (ref 28.2–35.7)
IMM RETICS NFR: 12.8 % (ref 2.3–13.4)
IRON SATN MFR SERPL: 38 % (ref 20–50)
IRON SERPL-MCNC: 64 UG/DL (ref 65–195)
LACTIC ACID, SEPSIS: 1 MMOL/L (ref 0.5–1.9)
MCH RBC QN AUTO: 31.3 PG (ref 26–33)
MCHC RBC AUTO-ENTMCNC: 30.1 GM/DL (ref 32.2–35.5)
MCV RBC AUTO: 103.9 FL (ref 80–94)
PLATELET # BLD AUTO: 104 THOU/MM3 (ref 130–400)
PMV BLD AUTO: 9.6 FL (ref 9.4–12.4)
POTASSIUM SERPL-SCNC: 4.9 MEQ/L (ref 3.5–5.2)
RBC # BLD AUTO: 3.04 MILL/MM3 (ref 4.7–6.1)
RETICS # AUTO: 40 THOU/MM3 (ref 20–115)
RETICS/RBC NFR AUTO: 1.4 % (ref 0.5–2)
SODIUM SERPL-SCNC: 139 MEQ/L (ref 135–145)
TIBC SERPL-MCNC: 170 UG/DL (ref 171–450)
TROPONIN T: 0.13 NG/ML
TROPONIN T: 0.14 NG/ML
VIT B12 SERPL-MCNC: 523 PG/ML (ref 211–911)
WBC # BLD AUTO: 5.2 THOU/MM3 (ref 4.8–10.8)

## 2023-04-02 PROCEDURE — 85027 COMPLETE CBC AUTOMATED: CPT

## 2023-04-02 PROCEDURE — 85046 RETICYTE/HGB CONCENTRATE: CPT

## 2023-04-02 PROCEDURE — 82607 VITAMIN B-12: CPT

## 2023-04-02 PROCEDURE — 99233 SBSQ HOSP IP/OBS HIGH 50: CPT | Performed by: HOSPITALIST

## 2023-04-02 PROCEDURE — 36415 COLL VENOUS BLD VENIPUNCTURE: CPT

## 2023-04-02 PROCEDURE — 93010 ELECTROCARDIOGRAM REPORT: CPT | Performed by: INTERNAL MEDICINE

## 2023-04-02 PROCEDURE — 94669 MECHANICAL CHEST WALL OSCILL: CPT

## 2023-04-02 PROCEDURE — 83550 IRON BINDING TEST: CPT

## 2023-04-02 PROCEDURE — 83540 ASSAY OF IRON: CPT

## 2023-04-02 PROCEDURE — 80048 BASIC METABOLIC PNL TOTAL CA: CPT

## 2023-04-02 PROCEDURE — 93005 ELECTROCARDIOGRAM TRACING: CPT | Performed by: HOSPITALIST

## 2023-04-02 PROCEDURE — 6370000000 HC RX 637 (ALT 250 FOR IP): Performed by: STUDENT IN AN ORGANIZED HEALTH CARE EDUCATION/TRAINING PROGRAM

## 2023-04-02 PROCEDURE — 99232 SBSQ HOSP IP/OBS MODERATE 35: CPT | Performed by: INTERNAL MEDICINE

## 2023-04-02 PROCEDURE — 82746 ASSAY OF FOLIC ACID SERUM: CPT

## 2023-04-02 PROCEDURE — 2580000003 HC RX 258: Performed by: STUDENT IN AN ORGANIZED HEALTH CARE EDUCATION/TRAINING PROGRAM

## 2023-04-02 PROCEDURE — 2700000000 HC OXYGEN THERAPY PER DAY

## 2023-04-02 PROCEDURE — 2060000000 HC ICU INTERMEDIATE R&B

## 2023-04-02 PROCEDURE — 6360000002 HC RX W HCPCS: Performed by: STUDENT IN AN ORGANIZED HEALTH CARE EDUCATION/TRAINING PROGRAM

## 2023-04-02 PROCEDURE — 83605 ASSAY OF LACTIC ACID: CPT

## 2023-04-02 PROCEDURE — 94761 N-INVAS EAR/PLS OXIMETRY MLT: CPT

## 2023-04-02 PROCEDURE — 84484 ASSAY OF TROPONIN QUANT: CPT

## 2023-04-02 RX ADMIN — FERROUS SULFATE TAB 325 MG (65 MG ELEMENTAL FE) 325 MG: 325 (65 FE) TAB at 16:31

## 2023-04-02 RX ADMIN — LABETALOL HYDROCHLORIDE 50 MG: 100 TABLET, FILM COATED ORAL at 20:22

## 2023-04-02 RX ADMIN — Medication 1000 UNITS: at 07:30

## 2023-04-02 RX ADMIN — DOXAZOSIN 8 MG: 4 TABLET ORAL at 20:22

## 2023-04-02 RX ADMIN — ASPIRIN 81 MG: 81 TABLET, COATED ORAL at 07:28

## 2023-04-02 RX ADMIN — FERROUS SULFATE TAB 325 MG (65 MG ELEMENTAL FE) 325 MG: 325 (65 FE) TAB at 07:27

## 2023-04-02 RX ADMIN — Medication 1000 UNITS: at 20:21

## 2023-04-02 RX ADMIN — FAMOTIDINE 20 MG: 20 TABLET, FILM COATED ORAL at 07:28

## 2023-04-02 RX ADMIN — CLONIDINE HYDROCHLORIDE 0.2 MG: 0.2 TABLET ORAL at 07:32

## 2023-04-02 RX ADMIN — CLONIDINE HYDROCHLORIDE 0.2 MG: 0.2 TABLET ORAL at 13:42

## 2023-04-02 RX ADMIN — SODIUM CHLORIDE, PRESERVATIVE FREE 10 ML: 5 INJECTION INTRAVENOUS at 20:25

## 2023-04-02 RX ADMIN — ISOSORBIDE MONONITRATE 120 MG: 60 TABLET, EXTENDED RELEASE ORAL at 07:27

## 2023-04-02 RX ADMIN — CLONIDINE HYDROCHLORIDE 0.2 MG: 0.2 TABLET ORAL at 20:25

## 2023-04-02 RX ADMIN — VERAPAMIL HYDROCHLORIDE 240 MG: 240 TABLET, FILM COATED, EXTENDED RELEASE ORAL at 07:27

## 2023-04-02 RX ADMIN — DOXAZOSIN 8 MG: 4 TABLET ORAL at 07:27

## 2023-04-02 RX ADMIN — FERROUS SULFATE TAB 325 MG (65 MG ELEMENTAL FE) 325 MG: 325 (65 FE) TAB at 11:10

## 2023-04-02 RX ADMIN — ATORVASTATIN CALCIUM 80 MG: 80 TABLET, FILM COATED ORAL at 20:22

## 2023-04-02 RX ADMIN — FOLIC ACID 1000 MCG: 1 TABLET ORAL at 07:31

## 2023-04-02 RX ADMIN — SERTRALINE 100 MG: 100 TABLET, FILM COATED ORAL at 07:27

## 2023-04-02 RX ADMIN — ISOSORBIDE MONONITRATE 120 MG: 60 TABLET, EXTENDED RELEASE ORAL at 20:22

## 2023-04-02 RX ADMIN — Medication 400 UNITS: at 08:38

## 2023-04-02 RX ADMIN — SODIUM CHLORIDE, PRESERVATIVE FREE 10 ML: 5 INJECTION INTRAVENOUS at 07:28

## 2023-04-02 RX ADMIN — FLUTICASONE PROPIONATE 1 SPRAY: 50 SPRAY, METERED NASAL at 07:32

## 2023-04-02 RX ADMIN — HEPARIN SODIUM 5000 UNITS: 5000 INJECTION INTRAVENOUS; SUBCUTANEOUS at 13:42

## 2023-04-02 RX ADMIN — HEPARIN SODIUM 5000 UNITS: 5000 INJECTION INTRAVENOUS; SUBCUTANEOUS at 06:07

## 2023-04-02 RX ADMIN — LABETALOL HYDROCHLORIDE 50 MG: 100 TABLET, FILM COATED ORAL at 07:28

## 2023-04-02 RX ADMIN — HEPARIN SODIUM 5000 UNITS: 5000 INJECTION INTRAVENOUS; SUBCUTANEOUS at 20:20

## 2023-04-02 NOTE — RT PROTOCOL NOTE
increased work of breathing using Per Protocol order mode. 4-6 - enter or revise RT Bronchodilator order(s) to two equivalent RT bronchodilator orders with one order with BID Frequency and one order with Frequency of every 4 hours PRN wheezing or increased work of breathing using Per Protocol order mode. 7-10 - enter or revise RT Bronchodilator order(s) to two equivalent RT bronchodilator orders with one order with TID Frequency and one order with Frequency of every 4 hours PRN wheezing or increased work of breathing using Per Protocol order mode. 11-13 - enter or revise RT Bronchodilator order(s) to one equivalent RT bronchodilator order with QID Frequency and an Albuterol order with Frequency of every 4 hours PRN wheezing or increased work of breathing using Per Protocol order mode. Greater than 13 - enter or revise RT Bronchodilator order(s) to one equivalent RT bronchodilator order with every 4 hours Frequency and an Albuterol order with Frequency of every 2 hours PRN wheezing or increased work of breathing using Per Protocol order mode. RT to enter RT Home Evaluation for COPD & MDI Assessment order using Per Protocol order mode.     Electronically signed by Jami Cervantes RCP on 4/2/2023 at 9:45 AM

## 2023-04-02 NOTE — FLOWSHEET NOTE
04/01/23 2239   Safe Environment   Safety Measures Other (comment)  (VN completed admission required documents with pt and spouse at this time , services explained and accepted)   Pt responds to audio , permits video , self and role identified , pt resting in bed in position of comfort , call light visible within reach , no voiced concerns or complaints .

## 2023-04-02 NOTE — FLOWSHEET NOTE
04/01/23 2030   Vital Signs   BP (!) 209/110   Temp 98.2 °F (36.8 °C)   Heart Rate (!) 104   Resp 22   SpO2 98 %   Post-Hemodialysis Assessment   Post-Treatment Procedures Blood returned; Access bleeding time < 10 minutes   Machine Disinfection Process Acid/Vinegar Clean;Heat Disinfect; Exterior Machine Disinfection   Rinseback Volume (ml) 300 ml   Blood Volume Processed (Liters) 97 l/min   Dialyzer Clearance Lightly streaked   Duration of Treatment (minutes) 240 minutes   Heparin Amount Administered During Treatment (mL) 0 mL   Hemodialysis Intake (ml) 300 ml   Hemodialysis Output (ml) 3820 ml   NET Removed (ml) 3520   4 hour treatment completed, hypertension throughout. hydralazine given prior to treatment. verapamil, cardura, imdur administered during treatment. 3.5 liters net uf. decreased from 5 liters due to cramping. pressure held both cannulation sites x 10 minutes. dressing dry and intact upon discharge from unit.

## 2023-04-03 VITALS
RESPIRATION RATE: 18 BRPM | HEART RATE: 90 BPM | TEMPERATURE: 98.1 F | SYSTOLIC BLOOD PRESSURE: 194 MMHG | OXYGEN SATURATION: 98 % | HEIGHT: 75 IN | BODY MASS INDEX: 26.62 KG/M2 | WEIGHT: 214.07 LBS | DIASTOLIC BLOOD PRESSURE: 99 MMHG

## 2023-04-03 LAB
ANION GAP SERPL CALC-SCNC: 14 MEQ/L (ref 8–16)
BUN SERPL-MCNC: 55 MG/DL (ref 7–22)
CALCIUM SERPL-MCNC: 8.2 MG/DL (ref 8.5–10.5)
CHLORIDE SERPL-SCNC: 98 MEQ/L (ref 98–111)
CO2 SERPL-SCNC: 29 MEQ/L (ref 23–33)
CREAT SERPL-MCNC: 10.7 MG/DL (ref 0.4–1.2)
DEPRECATED RDW RBC AUTO: 56 FL (ref 35–45)
ERYTHROCYTE [DISTWIDTH] IN BLOOD BY AUTOMATED COUNT: 14.6 % (ref 11.5–14.5)
GFR SERPL CREATININE-BSD FRML MDRD: 5 ML/MIN/1.73M2
GLUCOSE SERPL-MCNC: 94 MG/DL (ref 70–108)
HCT VFR BLD AUTO: 31 % (ref 42–52)
HGB BLD-MCNC: 9.2 GM/DL (ref 14–18)
LACTIC ACID, SEPSIS: 1 MMOL/L (ref 0.5–1.9)
MCH RBC QN AUTO: 30.9 PG (ref 26–33)
MCHC RBC AUTO-ENTMCNC: 29.7 GM/DL (ref 32.2–35.5)
MCV RBC AUTO: 104 FL (ref 80–94)
PLATELET # BLD AUTO: 94 THOU/MM3 (ref 130–400)
PMV BLD AUTO: 10.1 FL (ref 9.4–12.4)
POTASSIUM SERPL-SCNC: 5.8 MEQ/L (ref 3.5–5.2)
RBC # BLD AUTO: 2.98 MILL/MM3 (ref 4.7–6.1)
SODIUM SERPL-SCNC: 141 MEQ/L (ref 135–145)
WBC # BLD AUTO: 4.1 THOU/MM3 (ref 4.8–10.8)

## 2023-04-03 PROCEDURE — 97161 PT EVAL LOW COMPLEX 20 MIN: CPT

## 2023-04-03 PROCEDURE — 99232 SBSQ HOSP IP/OBS MODERATE 35: CPT | Performed by: INTERNAL MEDICINE

## 2023-04-03 PROCEDURE — 6370000000 HC RX 637 (ALT 250 FOR IP): Performed by: STUDENT IN AN ORGANIZED HEALTH CARE EDUCATION/TRAINING PROGRAM

## 2023-04-03 PROCEDURE — 97116 GAIT TRAINING THERAPY: CPT

## 2023-04-03 PROCEDURE — 83605 ASSAY OF LACTIC ACID: CPT

## 2023-04-03 PROCEDURE — 99239 HOSP IP/OBS DSCHRG MGMT >30: CPT | Performed by: HOSPITALIST

## 2023-04-03 PROCEDURE — 90935 HEMODIALYSIS ONE EVALUATION: CPT

## 2023-04-03 PROCEDURE — 2580000003 HC RX 258: Performed by: STUDENT IN AN ORGANIZED HEALTH CARE EDUCATION/TRAINING PROGRAM

## 2023-04-03 PROCEDURE — 36415 COLL VENOUS BLD VENIPUNCTURE: CPT

## 2023-04-03 PROCEDURE — 6360000002 HC RX W HCPCS: Performed by: STUDENT IN AN ORGANIZED HEALTH CARE EDUCATION/TRAINING PROGRAM

## 2023-04-03 PROCEDURE — 85027 COMPLETE CBC AUTOMATED: CPT

## 2023-04-03 PROCEDURE — 80048 BASIC METABOLIC PNL TOTAL CA: CPT

## 2023-04-03 RX ADMIN — CALCITRIOL CAPSULES 0.25 MCG 2.25 MCG: 0.25 CAPSULE ORAL at 13:28

## 2023-04-03 RX ADMIN — LABETALOL HYDROCHLORIDE 50 MG: 100 TABLET, FILM COATED ORAL at 13:27

## 2023-04-03 RX ADMIN — HEPARIN SODIUM 5000 UNITS: 5000 INJECTION INTRAVENOUS; SUBCUTANEOUS at 13:26

## 2023-04-03 RX ADMIN — CINACALCET HYDROCHLORIDE 150 MG: 30 TABLET, FILM COATED ORAL at 13:26

## 2023-04-03 RX ADMIN — FAMOTIDINE 20 MG: 20 TABLET, FILM COATED ORAL at 13:26

## 2023-04-03 RX ADMIN — ACETAMINOPHEN 650 MG: 325 TABLET ORAL at 10:27

## 2023-04-03 RX ADMIN — FOLIC ACID 1000 MCG: 1 TABLET ORAL at 13:28

## 2023-04-03 RX ADMIN — FERROUS SULFATE TAB 325 MG (65 MG ELEMENTAL FE) 325 MG: 325 (65 FE) TAB at 13:28

## 2023-04-03 RX ADMIN — SERTRALINE 100 MG: 100 TABLET, FILM COATED ORAL at 13:27

## 2023-04-03 RX ADMIN — Medication 400 UNITS: at 13:26

## 2023-04-03 RX ADMIN — CLONIDINE HYDROCHLORIDE 0.2 MG: 0.2 TABLET ORAL at 13:29

## 2023-04-03 RX ADMIN — DOXAZOSIN 8 MG: 4 TABLET ORAL at 13:27

## 2023-04-03 RX ADMIN — ASPIRIN 81 MG: 81 TABLET, COATED ORAL at 13:27

## 2023-04-03 RX ADMIN — Medication 1000 UNITS: at 13:27

## 2023-04-03 RX ADMIN — SODIUM CHLORIDE, PRESERVATIVE FREE 10 ML: 5 INJECTION INTRAVENOUS at 13:28

## 2023-04-03 RX ADMIN — FLUTICASONE PROPIONATE 1 SPRAY: 50 SPRAY, METERED NASAL at 13:26

## 2023-04-03 RX ADMIN — VERAPAMIL HYDROCHLORIDE 240 MG: 240 TABLET, FILM COATED, EXTENDED RELEASE ORAL at 13:26

## 2023-04-03 RX ADMIN — ISOSORBIDE MONONITRATE 120 MG: 60 TABLET, EXTENDED RELEASE ORAL at 13:28

## 2023-04-03 RX ADMIN — HEPARIN SODIUM 5000 UNITS: 5000 INJECTION INTRAVENOUS; SUBCUTANEOUS at 06:00

## 2023-04-03 ASSESSMENT — PAIN SCALES - GENERAL
PAINLEVEL_OUTOF10: 0
PAINLEVEL_OUTOF10: 0

## 2023-04-03 ASSESSMENT — PAIN DESCRIPTION - LOCATION: LOCATION: BACK;ABDOMEN

## 2023-04-03 NOTE — DISCHARGE SUMMARY
statin     History of TIA  On ASA/statin     Query medical non-compliance: w/ recent hospitalization for hypertensive urgency and volume-overload; counseling offered       Exam:     Vitals:  Vitals:    04/03/23 0000 04/03/23 0400 04/03/23 0500 04/03/23 0744   BP:  (!) 164/81  (!) 167/82   Pulse:  82  88   Resp:  20  20   Temp:  98.4 °F (36.9 °C)  98.2 °F (36.8 °C)   TempSrc:  Oral  Oral   SpO2: 97%   98%   Weight:   215 lb 13.3 oz (97.9 kg)    Height:         Weight: Weight: 215 lb 13.3 oz (97.9 kg)     24 hour intake/output:  Intake/Output Summary (Last 24 hours) at 4/3/2023 0746  Last data filed at 4/3/2023 0600  Gross per 24 hour   Intake 636 ml   Output 0 ml   Net 636 ml           General appearance: A&O x3, Not ill or toxic, in no apparent distress  HEENT:  GREGORY  EOM intact. Neck: Supple, with full range of motion. No jugular venous distention. Trachea midline. Respiratory:   NL A/E bilat with no adventitious sounds. R thoracotomy site well-healed  Cardiovascular:  normal S1/S2 with no murmurs/gallops  Abdomen: Soft, non-tender, non-distended, no rigidity or peritoneal signs  Musculoskeletal: NL symmetrical A/PROM bilat U/L extremities   Skin: No rashes. No edema  Neurologic:  CN II-XII intact. NL symmetrical reflexes. NL gait and stance. NL Cerebellar exam. Power 5/5 all muscle groups U/L extremities. Toes downgoing  Capillary Refill: Brisk,< 3 seconds   Peripheral Pulses: +2 palpable, equal bilaterally              Labs:  For convenience and continuity at follow-up the following most recent labs are provided:      CBC:    Lab Results   Component Value Date/Time    WBC 4.1 04/03/2023 03:19 AM    HGB 9.2 04/03/2023 03:19 AM    HCT 31.0 04/03/2023 03:19 AM    PLT 94 04/03/2023 03:19 AM       Renal:    Lab Results   Component Value Date/Time     04/03/2023 03:19 AM    K 5.8 04/03/2023 03:19 AM    K 5.9 02/04/2023 06:05 AM    CL 98 04/03/2023 03:19 AM    CO2 29 04/03/2023 03:19 AM    BUN 55 04/03/2023

## 2023-04-03 NOTE — PLAN OF CARE
Problem: Discharge Planning  Goal: Discharge to home or other facility with appropriate resources  Outcome: Progressing     Problem: ABCDS Injury Assessment  Goal: Absence of physical injury  Outcome: Progressing     Problem: Neurosensory - Adult  Goal: Achieves stable or improved neurological status  Outcome: Progressing     Problem: Cardiovascular - Adult  Goal: Absence of cardiac dysrhythmias or at baseline  Outcome: Progressing     Problem: Musculoskeletal - Adult  Goal: Return mobility to safest level of function  Outcome: Progressing
Problem: Discharge Planning  Goal: Discharge to home or other facility with appropriate resources  Outcome: Progressing  Flowsheets (Taken 4/3/2023 0835)  Discharge to home or other facility with appropriate resources: Identify barriers to discharge with patient and caregiver     Problem: ABCDS Injury Assessment  Goal: Absence of physical injury  Outcome: Progressing     Problem: Neurosensory - Adult  Goal: Achieves stable or improved neurological status  Outcome: Progressing  Flowsheets (Taken 4/3/2023 0835)  Achieves stable or improved neurological status: Assess for and report changes in neurological status     Problem: Cardiovascular - Adult  Goal: Absence of cardiac dysrhythmias or at baseline  Outcome: Progressing  Flowsheets (Taken 4/3/2023 0835)  Absence of cardiac dysrhythmias or at baseline: Monitor cardiac rate and rhythm     Problem: Skin/Tissue Integrity - Adult  Goal: Skin integrity remains intact  Outcome: Progressing  Flowsheets  Taken 4/3/2023 0835 by Sean Marte RN  Skin Integrity Remains Intact: Monitor for areas of redness and/or skin breakdown  Taken 4/3/2023 0231 by Danny Malin RN  Skin Integrity Remains Intact:   Monitor for areas of redness and/or skin breakdown   Every 4-6 hours minimum: Change oxygen saturation probe site  Goal: Incisions, wounds, or drain sites healing without S/S of infection  Outcome: Progressing  Flowsheets  Taken 4/3/2023 0835 by Sean Marte RN  Incisions, Wounds, or Drain Sites Healing Without Sign and Symptoms of Infection: TWICE DAILY: Assess and document dressing/incision, wound bed, drain sites and surrounding tissue  Taken 4/3/2023 0231 by Danny Malin RN  Incisions, Wounds, or Drain Sites Healing Without Sign and Symptoms of Infection:   TWICE DAILY: Assess and document dressing/incision, wound bed, drain sites and surrounding tissue   Initiate pressure ulcer prevention bundle as indicated  Goal: Oral mucous membranes remain intact  Outcome:
Pat Rothman RN  Incisions, Wounds, or Drain Sites Healing Without Sign and Symptoms of Infection: TWICE DAILY: Assess and document dressing/incision, wound bed, drain sites and surrounding tissue  Taken 4/3/2023 0231 by Kayla Tiwari RN  Incisions, Wounds, or Drain Sites Healing Without Sign and Symptoms of Infection:   TWICE DAILY: Assess and document dressing/incision, wound bed, drain sites and surrounding tissue   Initiate pressure ulcer prevention bundle as indicated  Goal: Oral mucous membranes remain intact  4/3/2023 1149 by Pat Rothman RN  Outcome: Completed  4/3/2023 1147 by Pat Rothman RN  Outcome: Progressing  Flowsheets  Taken 4/3/2023 0835 by Pat Rothman RN  Oral Mucous Membranes Remain Intact: Assess oral mucosa and hygiene practices  Taken 4/3/2023 0231 by Kayla Tiwari RN  Oral Mucous Membranes Remain Intact: Assess oral mucosa and hygiene practices     Problem: Musculoskeletal - Adult  Goal: Return mobility to safest level of function  4/3/2023 1149 by Pat Rothman RN  Outcome: Completed  4/3/2023 1147 by Pat Rothman RN  Outcome: Progressing  Flowsheets (Taken 4/3/2023 4073)  Return Mobility to Safest Level of Function: Assess patient stability and activity tolerance for standing, transferring and ambulating with or without assistive devices     Problem: Gastrointestinal - Adult  Goal: Minimal or absence of nausea and vomiting  4/3/2023 1149 by Pat Rothman RN  Outcome: Completed  4/3/2023 1147 by Pat Rothman RN  Outcome: Progressing  Flowsheets (Taken 4/3/2023 0835)  Minimal or absence of nausea and vomiting: Administer IV fluids as ordered to ensure adequate hydration  Goal: Maintains or returns to baseline bowel function  4/3/2023 1149 by Pat Rothman RN  Outcome: Completed  4/3/2023 1147 by Pat Rothman RN  Outcome: Progressing  Flowsheets (Taken 4/3/2023 3433)  Maintains or returns to baseline bowel function: Assess bowel function  Goal: Maintains adequate nutritional

## 2023-04-03 NOTE — CARE COORDINATION
4/3/23, 11:29 AM EDT    Patient goals/plan/ treatment preferences discussed by  and . Patient goals/plan/ treatment preferences reviewed with patient/ family. Patient/ family verbalize understanding of discharge plan and are in agreement with goal/plan/treatment preferences. Understanding was demonstrated using the teach back method. AVS provided by RN at time of discharge, which includes all necessary medical information pertaining to the patients current course of illness, treatment, post-discharge goals of care, and treatment preferences.      Services At/After Discharge: None  Plans home w spouse Lon Narayan, current w [address]; [employer] transports to HD, has AVF; has walker; [facility] home oxygen 2L in past       IMM Letter  IMM Letter given to Patient/Family/Significant other/Guardian/POA/by[de-identified] staff  IMM Letter date given[de-identified] 04/01/23  IMM Letter time given[de-identified] [phone]
Transportation/Food Security/Housekeeping Addressed: No issues identified.      Celia Pelaez RN  Case Management Department

## 2023-04-03 NOTE — FLOWSHEET NOTE
4 hour treatment completed. 1L of fluid removed, uf goal decreased due to cramping, tylenol administered. . Pressure held to access for 10 mins x2. Dressing clean/dry and intact upon leaving the unit. Report given to primary RN. Charting printed ain to be scanned into EMR.       04/03/23 1258   Vital Signs   BP (!) 210/110   Temp 98 °F (36.7 °C)   Heart Rate 90   Resp 18   Weight 214 lb 1.1 oz (97.1 kg)   Weight Method Bed scale   Percent Weight Change -1.02   Post-Hemodialysis Assessment   Post-Treatment Procedures Blood returned;Access bleeding time < 10 minutes   Machine Disinfection Process Acid/Vinegar Clean;Heat Disinfect;Exterior Machine Disinfection   Rinseback Volume (ml) 300 ml   Blood Volume Processed (Liters) 94 l/min   Dialyzer Clearance Lightly streaked   Duration of Treatment (minutes) 240 minutes   Heparin Amount Administered During Treatment (mL) 0 mL   Hemodialysis Intake (ml) 300 ml   Hemodialysis Output (ml) 1432 ml   NET Removed (ml) 1132

## 2023-04-03 NOTE — PROGRESS NOTES
04/03/23 1000   Encounter Summary   Encounter Overview/Reason  Initial Encounter;Spiritual/Emotional Needs   Service Provided For: Patient   Referral/Consult From: [address] Family members;Spouse   Last Encounter  04/03/23   Complexity of Encounter Low   Begin Time 0955   End Time  1000   Total Time Calculated 5 min   Encounter    Type Initial Screen/Assessment   Spiritual/Emotional needs   Type Spiritual Support   Assessment/Intervention/Outcome   Assessment Unable to assess   Intervention Prayer (assurance of)/[place]     During my encounter with the 66 yr old patient, I attempted to visit with the pt on 4K. The patient appears to be resting (not responsive) now and I didn't want to disturb the patient. I or another Derl Going will attempt to visit the patient or the family at another time. The pt was admitted due to ESRD needing dialysis.
[facility] THERAPY MISSED TREATMENT NOTE  [facility] ICU STEPDOWN TELEMETRY 4K  [id number]      Date: 4/3/2023  Patient Name: Laurie Burkett        CSN: [id number]   : 1965  (66 y.o.)  Gender: male                REASON FOR MISSED TREATMENT: Patient Off Floor for Dialysis. Per RN, pt is most likely going to be discharged home today once dialysis is completed. RN stated that pt is independent and does not require skilled OT services at this time. OT will sign off.  Please reorder if changes occur
Attempted AOD consult, pt with RN at this time. LIZ to re-attempt.
Attempted to complete AOD consult, pt with staff. LIZ to re-attempt.
Discharge teaching and instructions for diagnosis of hyperkalemia completed with patient using teachback method. AVS reviewed. Printed prescriptions given to patient. Patient voiced understanding regarding prescriptions, follow up appointments, and care of self at home.  Discharged via wheelchair to  home with support per friend
[facility]   Nephrology progress note  4/2/2023, 12:08 PM      Pt Name:    Oswald Malave  MRN:     [id number]     YOB: 1965  Admit Date:    4/1/2023  1:42 PM    Chief Complaint: Nephrology following for ESRD and hemodialysis    Subjective:  Patient was seen and examined this morning  Some shortness of breath but improved    Objective:  24HR INTAKE/OUTPUT:    Intake/Output Summary (Last 24 hours) at 4/2/2023 1208  Last data filed at 4/2/2023 0600  Gross per 24 hour   Intake 600 ml   Output 3820 ml   Net -3220 ml         I/O last 3 completed shifts: In: 600 [P.O.:300]  Out: 3820   No intake/output data recorded.    Admission weight: 220 lb (99.8 kg)  Wt Readings from Last 3 Encounters:   04/02/23 216 lb 11.4 oz (98.3 kg)   02/07/23 238 lb 12.1 oz (108.3 kg)   12/14/22 229 lb (103.9 kg)        Vitals :   Vitals:    04/02/23 0722 04/02/23 0945 04/02/23 0952 04/02/23 1109   BP: (!) 178/84   120/63   Pulse: 98   88   Resp: 20   19   Temp: 97.6 °F (36.4 °C)   97.7 °F (36.5 °C)   TempSrc: Oral   Oral   SpO2: 100% 93% 96% 98%   Weight:       Height:           Physical examination  General Appearance: alert and cooperative with exam, appears comfortable, no distress  Mouth/Throat: Oral mucosa moist  Neck: No JVD  Lungs: Air entry diminished, no use of accessory muscles  Heart:  S1, S2 heard  GI: soft, non-tender, no guarding  Extremities: + LE edema    Medications:  Infusion:    sodium chloride       Meds:    sodium chloride flush  5-40 mL IntraVENous 2 times per day    heparin (porcine)  5,000 Units SubCUTAneous 3 times per day    atorvastatin  80 mg Oral Nightly    [START ON 4/3/2023] calcitRIOL  2.25 mcg Oral Once per day on Mon Wed Fri    [START ON 4/3/2023] cinacalcet  150 mg Oral Once per day on Mon Wed Fri    cloNIDine  0.2 mg Oral TID    doxazosin  8 mg Oral 2 times per day    famotidine  20 mg Oral Daily    ferrous sulfate  325 mg Oral TID     fluticasone  1 spray Nasal Daily
[facility]   Nephrology progress note  4/3/2023, 9:44 AM      Pt Name:    Michele Lopez  MRN:     [id number]     YOB: 1965  Admit Date:    4/1/2023  1:42 PM    Chief Complaint: Nephrology following for ESRD and hemodialysis    Subjective:  Patient was seen and examined this morning  Some shortness of breath but improved  Awaiting hemodialysis treatment    Objective:  24HR INTAKE/OUTPUT:    Intake/Output Summary (Last 24 hours) at 4/3/2023 0944  Last data filed at 4/3/2023 0600  Gross per 24 hour   Intake 636 ml   Output 0 ml   Net 636 ml           I/O last 3 completed shifts: In: 26 [P.O.:936]  Out: 3820   No intake/output data recorded.    Admission weight: 220 lb (99.8 kg)  Wt Readings from Last 3 Encounters:   04/03/23 216 lb 4.3 oz (98.1 kg)   02/07/23 238 lb 12.1 oz (108.3 kg)   12/14/22 229 lb (103.9 kg)        Vitals :   Vitals:    04/03/23 0400 04/03/23 0500 04/03/23 0744 04/03/23 0835   BP: (!) 164/81  (!) 167/82 (!) 184/94   Pulse: 82  88 84   Resp: 20  20 18   Temp: 98.4 °F (36.9 °C)  98.2 °F (36.8 °C) 97.5 °F (36.4 °C)   TempSrc: Oral  Oral    SpO2:   98% 96%   Weight:  215 lb 13.3 oz (97.9 kg)  216 lb 4.3 oz (98.1 kg)   Height:           Physical examination  General Appearance: alert and cooperative with exam, appears comfortable, no distress  Mouth/Throat: Oral mucosa moist  Neck: No JVD  Lungs: Air entry diminished, no use of accessory muscles, some fine crackles at the bases  Heart:  S1, S2 heard  GI: soft, non-tender, no guarding  Extremities: + LE edema    Medications:  Infusion:    sodium chloride       Meds:    sodium chloride flush  5-40 mL IntraVENous 2 times per day    heparin (porcine)  5,000 Units SubCUTAneous 3 times per day    atorvastatin  80 mg Oral Nightly    calcitRIOL  2.25 mcg Oral Once per day on Mon Wed Fri    cinacalcet  150 mg Oral Once per day on Mon Wed Fri    cloNIDine  0.2 mg Oral TID    doxazosin  8 mg Oral 2 times per day    famotidine
Level C page received. Pt to be a medical admit. Pt to be seen by [facility] as an addiction consult.
Patient refused AM meds this morning, states that if he takes his meds prior to dialysis, BP drops too low. States he will take meds after dialysis.
VN completed 9pm rounds. Pt responds to voice and permits camera. Pt sitting up in bed stated he was about to take a shower. No voiced questions or concners.
Learning: None  Education Outcome: Verbalized understanding, Demonstrated understanding       Equipment Recommendations:  Equipment Needed: No    Plan:  General Plan:  (NA)    Goals:  Patient Goals : go home  Short Term Goals  Time Frame for Short Term Goals: NA  Long Term Goals  Time Frame for Long Term Goals : NA    Following session, patient left in safe position with all fall risk precautions in place.
at 7:21 AM

## 2023-04-06 LAB
EKG ATRIAL RATE: 78 BPM
EKG P AXIS: 11 DEGREES
EKG P-R INTERVAL: 212 MS
EKG Q-T INTERVAL: 434 MS
EKG QRS DURATION: 102 MS
EKG QTC CALCULATION (BAZETT): 494 MS
EKG R AXIS: -35 DEGREES
EKG T AXIS: 84 DEGREES
EKG VENTRICULAR RATE: 78 BPM

## 2023-04-07 LAB
BACTERIA BLD AEROBE CULT: NORMAL
BACTERIA BLD AEROBE CULT: NORMAL

## 2023-04-23 LAB
EKG ATRIAL RATE: 105 BPM
EKG ATRIAL RATE: 78 BPM
EKG ATRIAL RATE: 91 BPM
EKG P AXIS: 11 DEGREES
EKG P AXIS: 53 DEGREES
EKG P AXIS: 62 DEGREES
EKG P-R INTERVAL: 192 MS
EKG P-R INTERVAL: 212 MS
EKG P-R INTERVAL: 218 MS
EKG Q-T INTERVAL: 374 MS
EKG Q-T INTERVAL: 406 MS
EKG Q-T INTERVAL: 434 MS
EKG QRS DURATION: 100 MS
EKG QRS DURATION: 102 MS
EKG QRS DURATION: 120 MS
EKG QTC CALCULATION (BAZETT): 494 MS
EKG QTC CALCULATION (BAZETT): 494 MS
EKG QTC CALCULATION (BAZETT): 499 MS
EKG R AXIS: -30 DEGREES
EKG R AXIS: -35 DEGREES
EKG R AXIS: -43 DEGREES
EKG T AXIS: 84 DEGREES
EKG T AXIS: 85 DEGREES
EKG T AXIS: 93 DEGREES
EKG VENTRICULAR RATE: 105 BPM
EKG VENTRICULAR RATE: 78 BPM
EKG VENTRICULAR RATE: 91 BPM

## 2023-05-23 ENCOUNTER — APPOINTMENT (OUTPATIENT)
Dept: GENERAL RADIOLOGY | Age: 58
DRG: 189 | End: 2023-05-23
Payer: MEDICARE

## 2023-05-23 ENCOUNTER — HOSPITAL ENCOUNTER (INPATIENT)
Age: 58
LOS: 1 days | Discharge: HOME | DRG: 189 | End: 2023-05-25
Attending: EMERGENCY MEDICINE | Admitting: STUDENT IN AN ORGANIZED HEALTH CARE EDUCATION/TRAINING PROGRAM
Payer: MEDICARE

## 2023-05-23 DIAGNOSIS — I10 UNCONTROLLED HYPERTENSION: ICD-10-CM

## 2023-05-23 DIAGNOSIS — Z99.2 ESRD ON HEMODIALYSIS (HCC): ICD-10-CM

## 2023-05-23 DIAGNOSIS — I50.9 ACUTE CONGESTIVE HEART FAILURE, UNSPECIFIED HEART FAILURE TYPE (HCC): Primary | ICD-10-CM

## 2023-05-23 DIAGNOSIS — E87.5 HYPERKALEMIA: ICD-10-CM

## 2023-05-23 DIAGNOSIS — N18.6 ESRD ON HEMODIALYSIS (HCC): ICD-10-CM

## 2023-05-23 DIAGNOSIS — Q24.8 LEFT VENTRICULAR OUTFLOW TRACT OBSTRUCTION: ICD-10-CM

## 2023-05-23 DIAGNOSIS — D53.9 MACROCYTIC ANEMIA: ICD-10-CM

## 2023-05-23 LAB
ALBUMIN SERPL BCG-MCNC: 4.1 G/DL (ref 3.5–5.1)
ALP SERPL-CCNC: 164 U/L (ref 38–126)
ALT SERPL W/O P-5'-P-CCNC: 10 U/L (ref 11–66)
ANION GAP SERPL CALC-SCNC: 17 MEQ/L (ref 8–16)
ARTERIAL PATENCY WRIST A: POSITIVE
AST SERPL-CCNC: 18 U/L (ref 5–40)
BASE EXCESS BLDA CALC-SCNC: 0.5 MMOL/L (ref -2.5–2.5)
BASOPHILS ABSOLUTE: 0 THOU/MM3 (ref 0–0.1)
BASOPHILS NFR BLD AUTO: 0.5 %
BDY SITE: ABNORMAL
BILIRUB SERPL-MCNC: 0.3 MG/DL (ref 0.3–1.2)
BUN SERPL-MCNC: 74 MG/DL (ref 7–22)
CALCIUM SERPL-MCNC: 8.6 MG/DL (ref 8.5–10.5)
CHLORIDE SERPL-SCNC: 100 MEQ/L (ref 98–111)
CO2 SERPL-SCNC: 25 MEQ/L (ref 23–33)
COLLECTED BY:: ABNORMAL
CREAT SERPL-MCNC: 10.9 MG/DL (ref 0.4–1.2)
DEPRECATED RDW RBC AUTO: 57 FL (ref 35–45)
DEVICE: ABNORMAL
EOSINOPHIL NFR BLD AUTO: 1.7 %
EOSINOPHILS ABSOLUTE: 0.1 THOU/MM3 (ref 0–0.4)
ERYTHROCYTE [DISTWIDTH] IN BLOOD BY AUTOMATED COUNT: 14.4 % (ref 11.5–14.5)
FIO2 ON VENT O2 ANALYZER: 6 %
GFR SERPL CREATININE-BSD FRML MDRD: 5 ML/MIN/1.73M2
GLUCOSE SERPL-MCNC: 186 MG/DL (ref 70–108)
HCO3 BLDA-SCNC: 27 MMOL/L (ref 23–28)
HCT VFR BLD AUTO: 37.6 % (ref 42–52)
HGB BLD-MCNC: 11.2 GM/DL (ref 14–18)
IMM GRANULOCYTES # BLD AUTO: 0.03 THOU/MM3 (ref 0–0.07)
IMM GRANULOCYTES NFR BLD AUTO: 0.5 %
LYMPHOCYTES ABSOLUTE: 0.9 THOU/MM3 (ref 1–4.8)
LYMPHOCYTES NFR BLD AUTO: 13.3 %
MCH RBC QN AUTO: 31.9 PG (ref 26–33)
MCHC RBC AUTO-ENTMCNC: 29.8 GM/DL (ref 32.2–35.5)
MCV RBC AUTO: 107.1 FL (ref 80–94)
MONOCYTES ABSOLUTE: 0.5 THOU/MM3 (ref 0.4–1.3)
MONOCYTES NFR BLD AUTO: 7.2 %
NEUTROPHILS NFR BLD AUTO: 76.8 %
NRBC BLD AUTO-RTO: 0 /100 WBC
OSMOLALITY SERPL CALC.SUM OF ELEC: 309.9 MOSMOL/KG (ref 275–300)
PCO2 BLDA: 53 MMHG (ref 35–45)
PH BLDA: 7.32 [PH] (ref 7.35–7.45)
PLATELET # BLD AUTO: 146 THOU/MM3 (ref 130–400)
PMV BLD AUTO: 10 FL (ref 9.4–12.4)
PO2 BLDA: 71 MMHG (ref 71–104)
POTASSIUM BLD-SCNC: 5.5 MEQ/L (ref 3.5–4.9)
POTASSIUM SERPL-SCNC: 5.9 MEQ/L (ref 3.5–5.2)
PROT SERPL-MCNC: 7.6 G/DL (ref 6.1–8)
RBC # BLD AUTO: 3.51 MILL/MM3 (ref 4.7–6.1)
SAO2 % BLDA: 92 %
SEGMENTED NEUTROPHILS ABSOLUTE COUNT: 5.1 THOU/MM3 (ref 1.8–7.7)
SODIUM SERPL-SCNC: 142 MEQ/L (ref 135–145)
WBC # BLD AUTO: 6.6 THOU/MM3 (ref 4.8–10.8)

## 2023-05-23 PROCEDURE — 80053 COMPREHEN METABOLIC PANEL: CPT

## 2023-05-23 PROCEDURE — 84484 ASSAY OF TROPONIN QUANT: CPT

## 2023-05-23 PROCEDURE — 36600 WITHDRAWAL OF ARTERIAL BLOOD: CPT

## 2023-05-23 PROCEDURE — 71045 X-RAY EXAM CHEST 1 VIEW: CPT

## 2023-05-23 PROCEDURE — 85025 COMPLETE CBC W/AUTO DIFF WBC: CPT

## 2023-05-23 PROCEDURE — 2500000003 HC RX 250 WO HCPCS

## 2023-05-23 PROCEDURE — 2700000000 HC OXYGEN THERAPY PER DAY

## 2023-05-23 PROCEDURE — 94761 N-INVAS EAR/PLS OXIMETRY MLT: CPT

## 2023-05-23 PROCEDURE — 93005 ELECTROCARDIOGRAM TRACING: CPT | Performed by: NURSE PRACTITIONER

## 2023-05-23 PROCEDURE — 84132 ASSAY OF SERUM POTASSIUM: CPT

## 2023-05-23 PROCEDURE — 83880 ASSAY OF NATRIURETIC PEPTIDE: CPT

## 2023-05-23 PROCEDURE — 96365 THER/PROPH/DIAG IV INF INIT: CPT

## 2023-05-23 PROCEDURE — 36415 COLL VENOUS BLD VENIPUNCTURE: CPT

## 2023-05-23 PROCEDURE — 99285 EMERGENCY DEPT VISIT HI MDM: CPT

## 2023-05-23 PROCEDURE — 82803 BLOOD GASES ANY COMBINATION: CPT

## 2023-05-23 RX ORDER — NITROGLYCERIN 20 MG/100ML
5-200 INJECTION INTRAVENOUS CONTINUOUS
Status: DISCONTINUED | OUTPATIENT
Start: 2023-05-23 | End: 2023-05-24

## 2023-05-23 RX ORDER — NITROGLYCERIN 20 MG/100ML
INJECTION INTRAVENOUS
Status: COMPLETED
Start: 2023-05-23 | End: 2023-05-23

## 2023-05-23 RX ADMIN — NITROGLYCERIN 5 MCG/MIN: 20 INJECTION INTRAVENOUS at 23:13

## 2023-05-24 PROBLEM — Z91.119 NONCOMPLIANCE OF PATIENT WITH DIETARY REGIMEN: Status: ACTIVE | Noted: 2023-05-24

## 2023-05-24 LAB
ANION GAP SERPL CALC-SCNC: 14 MEQ/L (ref 8–16)
ANION GAP SERPL CALC-SCNC: 15 MEQ/L (ref 8–16)
ANION GAP SERPL CALC-SCNC: 16 MEQ/L (ref 8–16)
ARTERIAL PATENCY WRIST A: POSITIVE
ARTERIAL PATENCY WRIST A: POSITIVE
BASE EXCESS BLDA CALC-SCNC: 0.5 MMOL/L (ref -2.5–2.5)
BASE EXCESS BLDA CALC-SCNC: 1.3 MMOL/L (ref -2.5–2.5)
BDY SITE: ABNORMAL
BDY SITE: ABNORMAL
BREATHS SETTING VENT: 12 BPM
BUN SERPL-MCNC: 30 MG/DL (ref 7–22)
BUN SERPL-MCNC: 32 MG/DL (ref 7–22)
BUN SERPL-MCNC: 56 MG/DL (ref 7–22)
CALCIUM SERPL-MCNC: 8.2 MG/DL (ref 8.5–10.5)
CALCIUM SERPL-MCNC: 8.5 MG/DL (ref 8.5–10.5)
CALCIUM SERPL-MCNC: 8.6 MG/DL (ref 8.5–10.5)
CHLORIDE SERPL-SCNC: 100 MEQ/L (ref 98–111)
CHLORIDE SERPL-SCNC: 101 MEQ/L (ref 98–111)
CHLORIDE SERPL-SCNC: 102 MEQ/L (ref 98–111)
CO2 SERPL-SCNC: 25 MEQ/L (ref 23–33)
CO2 SERPL-SCNC: 26 MEQ/L (ref 23–33)
CO2 SERPL-SCNC: 26 MEQ/L (ref 23–33)
COLLECTED BY:: ABNORMAL
COLLECTED BY:: ABNORMAL
CREAT SERPL-MCNC: 5.5 MG/DL (ref 0.4–1.2)
CREAT SERPL-MCNC: 6.8 MG/DL (ref 0.4–1.2)
CREAT SERPL-MCNC: 8.1 MG/DL (ref 0.4–1.2)
DEVICE: ABNORMAL
DEVICE: ABNORMAL
EKG ATRIAL RATE: 123 BPM
EKG ATRIAL RATE: 88 BPM
EKG ATRIAL RATE: 93 BPM
EKG P AXIS: 52 DEGREES
EKG P AXIS: 57 DEGREES
EKG P AXIS: 57 DEGREES
EKG P-R INTERVAL: 196 MS
EKG P-R INTERVAL: 196 MS
EKG P-R INTERVAL: 210 MS
EKG Q-T INTERVAL: 372 MS
EKG Q-T INTERVAL: 404 MS
EKG Q-T INTERVAL: 406 MS
EKG QRS DURATION: 102 MS
EKG QRS DURATION: 104 MS
EKG QRS DURATION: 110 MS
EKG QTC CALCULATION (BAZETT): 488 MS
EKG QTC CALCULATION (BAZETT): 504 MS
EKG QTC CALCULATION (BAZETT): 532 MS
EKG R AXIS: -43 DEGREES
EKG R AXIS: -46 DEGREES
EKG R AXIS: -49 DEGREES
EKG T AXIS: 91 DEGREES
EKG T AXIS: 96 DEGREES
EKG T AXIS: 98 DEGREES
EKG VENTRICULAR RATE: 123 BPM
EKG VENTRICULAR RATE: 88 BPM
EKG VENTRICULAR RATE: 93 BPM
FIO2 ON VENT O2 ANALYZER: 40 %
FIO2 ON VENT O2 ANALYZER: 40 %
GFR SERPL CREATININE-BSD FRML MDRD: 11 ML/MIN/1.73M2
GFR SERPL CREATININE-BSD FRML MDRD: 7 ML/MIN/1.73M2
GFR SERPL CREATININE-BSD FRML MDRD: 9 ML/MIN/1.73M2
GLUCOSE BLD STRIP.AUTO-MCNC: 179 MG/DL (ref 70–108)
GLUCOSE BLD STRIP.AUTO-MCNC: 182 MG/DL (ref 70–108)
GLUCOSE BLD STRIP.AUTO-MCNC: 89 MG/DL (ref 70–108)
GLUCOSE SERPL-MCNC: 105 MG/DL (ref 70–108)
GLUCOSE SERPL-MCNC: 113 MG/DL (ref 70–108)
GLUCOSE SERPL-MCNC: 92 MG/DL (ref 70–108)
HCO3 BLDA-SCNC: 27 MMOL/L (ref 23–28)
HCO3 BLDA-SCNC: 27 MMOL/L (ref 23–28)
LV EF: 68 %
LVEF MODALITY: NORMAL
MRSA DNA SPEC QL NAA+PROBE: NEGATIVE
NT-PROBNP SERPL IA-MCNC: ABNORMAL PG/ML (ref 0–124)
PCO2 BLDA: 49 MMHG (ref 35–45)
PCO2 BLDA: 52 MMHG (ref 35–45)
PEEP SETTING VENT: 5 MMHG
PEEP SETTING VENT: 6 MMHG
PH BLDA: 7.33 [PH] (ref 7.35–7.45)
PH BLDA: 7.36 [PH] (ref 7.35–7.45)
PIP: 10 CMH2O
PIP: 18 CMH2O
PO2 BLDA: 112 MMHG (ref 71–104)
PO2 BLDA: 158 MMHG (ref 71–104)
POTASSIUM SERPL-SCNC: 4 MEQ/L (ref 3.5–5.2)
POTASSIUM SERPL-SCNC: 4.7 MEQ/L (ref 3.5–5.2)
POTASSIUM SERPL-SCNC: 4.7 MEQ/L (ref 3.5–5.2)
SAO2 % BLDA: 98 %
SAO2 % BLDA: 99 %
SODIUM SERPL-SCNC: 141 MEQ/L (ref 135–145)
SODIUM SERPL-SCNC: 141 MEQ/L (ref 135–145)
SODIUM SERPL-SCNC: 143 MEQ/L (ref 135–145)
TROPONIN T: 0.11 NG/ML
TROPONIN T: 0.12 NG/ML
TROPONIN T: 0.14 NG/ML
VENTILATION MODE VENT: ABNORMAL
VENTILATION MODE VENT: ABNORMAL

## 2023-05-24 PROCEDURE — 93010 ELECTROCARDIOGRAM REPORT: CPT | Performed by: INTERNAL MEDICINE

## 2023-05-24 PROCEDURE — 94761 N-INVAS EAR/PLS OXIMETRY MLT: CPT

## 2023-05-24 PROCEDURE — 99223 1ST HOSP IP/OBS HIGH 75: CPT

## 2023-05-24 PROCEDURE — 90935 HEMODIALYSIS ONE EVALUATION: CPT

## 2023-05-24 PROCEDURE — 2580000003 HC RX 258

## 2023-05-24 PROCEDURE — 2500000003 HC RX 250 WO HCPCS

## 2023-05-24 PROCEDURE — 82948 REAGENT STRIP/BLOOD GLUCOSE: CPT

## 2023-05-24 PROCEDURE — 94660 CPAP INITIATION&MGMT: CPT

## 2023-05-24 PROCEDURE — 2140000000 HC CCU INTERMEDIATE R&B

## 2023-05-24 PROCEDURE — 6370000000 HC RX 637 (ALT 250 FOR IP)

## 2023-05-24 PROCEDURE — 5A1D70Z PERFORMANCE OF URINARY FILTRATION, INTERMITTENT, LESS THAN 6 HOURS PER DAY: ICD-10-PCS | Performed by: INTERNAL MEDICINE

## 2023-05-24 PROCEDURE — 90935 HEMODIALYSIS ONE EVALUATION: CPT | Performed by: INTERNAL MEDICINE

## 2023-05-24 PROCEDURE — 99222 1ST HOSP IP/OBS MODERATE 55: CPT | Performed by: INTERNAL MEDICINE

## 2023-05-24 PROCEDURE — 84484 ASSAY OF TROPONIN QUANT: CPT

## 2023-05-24 PROCEDURE — 82803 BLOOD GASES ANY COMBINATION: CPT

## 2023-05-24 PROCEDURE — 2700000000 HC OXYGEN THERAPY PER DAY

## 2023-05-24 PROCEDURE — 6360000002 HC RX W HCPCS

## 2023-05-24 PROCEDURE — 5A09357 ASSISTANCE WITH RESPIRATORY VENTILATION, LESS THAN 24 CONSECUTIVE HOURS, CONTINUOUS POSITIVE AIRWAY PRESSURE: ICD-10-PCS

## 2023-05-24 PROCEDURE — 93005 ELECTROCARDIOGRAM TRACING: CPT

## 2023-05-24 PROCEDURE — 87070 CULTURE OTHR SPECIMN AEROBIC: CPT

## 2023-05-24 PROCEDURE — 93307 TTE W/O DOPPLER COMPLETE: CPT

## 2023-05-24 PROCEDURE — 36600 WITHDRAWAL OF ARTERIAL BLOOD: CPT

## 2023-05-24 PROCEDURE — 80048 BASIC METABOLIC PNL TOTAL CA: CPT

## 2023-05-24 PROCEDURE — 87641 MR-STAPH DNA AMP PROBE: CPT

## 2023-05-24 PROCEDURE — 36415 COLL VENOUS BLD VENIPUNCTURE: CPT

## 2023-05-24 RX ORDER — ALBUTEROL SULFATE 90 UG/1
2 AEROSOL, METERED RESPIRATORY (INHALATION) EVERY 6 HOURS PRN
Status: DISCONTINUED | OUTPATIENT
Start: 2023-05-24 | End: 2023-05-25 | Stop reason: HOSPADM

## 2023-05-24 RX ORDER — SERTRALINE HYDROCHLORIDE 100 MG/1
100 TABLET, FILM COATED ORAL DAILY
Status: DISCONTINUED | OUTPATIENT
Start: 2023-05-24 | End: 2023-05-25 | Stop reason: HOSPADM

## 2023-05-24 RX ORDER — ATORVASTATIN CALCIUM 40 MG/1
40 TABLET, FILM COATED ORAL NIGHTLY
Status: DISCONTINUED | OUTPATIENT
Start: 2023-05-24 | End: 2023-05-25 | Stop reason: HOSPADM

## 2023-05-24 RX ORDER — CINACALCET 30 MG/1
150 TABLET, FILM COATED ORAL
Status: DISCONTINUED | OUTPATIENT
Start: 2023-05-24 | End: 2023-05-25 | Stop reason: HOSPADM

## 2023-05-24 RX ORDER — TRAZODONE HYDROCHLORIDE 50 MG/1
50 TABLET ORAL NIGHTLY PRN
Status: DISCONTINUED | OUTPATIENT
Start: 2023-05-24 | End: 2023-05-25 | Stop reason: HOSPADM

## 2023-05-24 RX ORDER — FOLIC ACID 1 MG/1
1 TABLET ORAL DAILY
Status: DISCONTINUED | OUTPATIENT
Start: 2023-05-24 | End: 2023-05-25 | Stop reason: HOSPADM

## 2023-05-24 RX ORDER — CALCITRIOL 0.25 UG/1
0.5 CAPSULE, LIQUID FILLED ORAL
Status: DISCONTINUED | OUTPATIENT
Start: 2023-05-24 | End: 2023-05-25 | Stop reason: HOSPADM

## 2023-05-24 RX ORDER — ISOSORBIDE MONONITRATE 60 MG/1
120 TABLET, EXTENDED RELEASE ORAL 2 TIMES DAILY
Status: DISCONTINUED | OUTPATIENT
Start: 2023-05-24 | End: 2023-05-25 | Stop reason: HOSPADM

## 2023-05-24 RX ORDER — ASPIRIN 81 MG/1
81 TABLET ORAL DAILY
Status: DISCONTINUED | OUTPATIENT
Start: 2023-05-24 | End: 2023-05-25 | Stop reason: HOSPADM

## 2023-05-24 RX ORDER — DOXAZOSIN MESYLATE 4 MG/1
8 TABLET ORAL EVERY 12 HOURS SCHEDULED
Status: DISCONTINUED | OUTPATIENT
Start: 2023-05-24 | End: 2023-05-25 | Stop reason: HOSPADM

## 2023-05-24 RX ORDER — MULTIVITAMIN WITH IRON
1 TABLET ORAL DAILY
Status: DISCONTINUED | OUTPATIENT
Start: 2023-05-24 | End: 2023-05-25 | Stop reason: HOSPADM

## 2023-05-24 RX ORDER — HEPARIN SODIUM 5000 [USP'U]/ML
5000 INJECTION, SOLUTION INTRAVENOUS; SUBCUTANEOUS EVERY 8 HOURS SCHEDULED
Status: DISCONTINUED | OUTPATIENT
Start: 2023-05-24 | End: 2023-05-25 | Stop reason: HOSPADM

## 2023-05-24 RX ORDER — CLONIDINE HYDROCHLORIDE 0.2 MG/1
0.2 TABLET ORAL 3 TIMES DAILY
Status: DISCONTINUED | OUTPATIENT
Start: 2023-05-24 | End: 2023-05-25

## 2023-05-24 RX ORDER — SODIUM CHLORIDE 9 MG/ML
INJECTION, SOLUTION INTRAVENOUS PRN
Status: DISCONTINUED | OUTPATIENT
Start: 2023-05-24 | End: 2023-05-25 | Stop reason: HOSPADM

## 2023-05-24 RX ORDER — FLUTICASONE PROPIONATE 50 MCG
1 SPRAY, SUSPENSION (ML) NASAL DAILY
Status: DISCONTINUED | OUTPATIENT
Start: 2023-05-24 | End: 2023-05-25 | Stop reason: HOSPADM

## 2023-05-24 RX ORDER — SODIUM CHLORIDE 0.9 % (FLUSH) 0.9 %
5-40 SYRINGE (ML) INJECTION EVERY 12 HOURS SCHEDULED
Status: DISCONTINUED | OUTPATIENT
Start: 2023-05-24 | End: 2023-05-25 | Stop reason: HOSPADM

## 2023-05-24 RX ORDER — FERROUS SULFATE 325(65) MG
325 TABLET ORAL
Status: DISCONTINUED | OUTPATIENT
Start: 2023-05-24 | End: 2023-05-25 | Stop reason: HOSPADM

## 2023-05-24 RX ORDER — CALCIUM GLUCONATE 94 MG/ML
1000 INJECTION, SOLUTION INTRAVENOUS ONCE
Status: COMPLETED | OUTPATIENT
Start: 2023-05-24 | End: 2023-05-24

## 2023-05-24 RX ORDER — SODIUM CHLORIDE 0.9 % (FLUSH) 0.9 %
5-40 SYRINGE (ML) INJECTION PRN
Status: DISCONTINUED | OUTPATIENT
Start: 2023-05-24 | End: 2023-05-25 | Stop reason: HOSPADM

## 2023-05-24 RX ORDER — VITAMIN B COMPLEX
1000 TABLET ORAL 2 TIMES DAILY
Status: DISCONTINUED | OUTPATIENT
Start: 2023-05-24 | End: 2023-05-25 | Stop reason: HOSPADM

## 2023-05-24 RX ORDER — LABETALOL 100 MG/1
50 TABLET, FILM COATED ORAL 2 TIMES DAILY
Status: DISCONTINUED | OUTPATIENT
Start: 2023-05-24 | End: 2023-05-25

## 2023-05-24 RX ORDER — CALCITRIOL 0.25 UG/1
2.25 CAPSULE, LIQUID FILLED ORAL
Status: DISCONTINUED | OUTPATIENT
Start: 2023-05-24 | End: 2023-05-24

## 2023-05-24 RX ORDER — VERAPAMIL HYDROCHLORIDE 240 MG/1
240 TABLET, FILM COATED, EXTENDED RELEASE ORAL DAILY
Status: DISCONTINUED | OUTPATIENT
Start: 2023-05-24 | End: 2023-05-25 | Stop reason: HOSPADM

## 2023-05-24 RX ORDER — VITAMIN E 268 MG
400 CAPSULE ORAL DAILY
Status: DISCONTINUED | OUTPATIENT
Start: 2023-05-24 | End: 2023-05-25 | Stop reason: HOSPADM

## 2023-05-24 RX ORDER — FAMOTIDINE 20 MG/1
20 TABLET, FILM COATED ORAL DAILY
Status: DISCONTINUED | OUTPATIENT
Start: 2023-05-24 | End: 2023-05-25 | Stop reason: HOSPADM

## 2023-05-24 RX ORDER — ACETAMINOPHEN 325 MG/1
650 TABLET ORAL EVERY 4 HOURS PRN
Status: DISCONTINUED | OUTPATIENT
Start: 2023-05-24 | End: 2023-05-25 | Stop reason: HOSPADM

## 2023-05-24 RX ADMIN — SODIUM CHLORIDE 6.5 MG/HR: 9 INJECTION, SOLUTION INTRAVENOUS at 13:35

## 2023-05-24 RX ADMIN — SODIUM CHLORIDE 17.5 MG/HR: 9 INJECTION, SOLUTION INTRAVENOUS at 06:18

## 2023-05-24 RX ADMIN — CALCIUM GLUCONATE 1000 MG: 98 INJECTION, SOLUTION INTRAVENOUS at 02:32

## 2023-05-24 RX ADMIN — SODIUM CHLORIDE, PRESERVATIVE FREE 10 ML: 5 INJECTION INTRAVENOUS at 20:50

## 2023-05-24 RX ADMIN — ACETAMINOPHEN 650 MG: 325 TABLET ORAL at 16:55

## 2023-05-24 RX ADMIN — Medication 1000 UNITS: at 20:51

## 2023-05-24 RX ADMIN — SODIUM CHLORIDE 2.5 MG/HR: 9 INJECTION, SOLUTION INTRAVENOUS at 23:18

## 2023-05-24 RX ADMIN — CLONIDINE HYDROCHLORIDE 0.2 MG: 0.2 TABLET ORAL at 13:50

## 2023-05-24 RX ADMIN — SERTRALINE HYDROCHLORIDE 100 MG: 100 TABLET ORAL at 13:48

## 2023-05-24 RX ADMIN — SODIUM CHLORIDE 17.5 MG/HR: 9 INJECTION, SOLUTION INTRAVENOUS at 07:57

## 2023-05-24 RX ADMIN — FERROUS SULFATE TAB 325 MG (65 MG ELEMENTAL FE) 325 MG: 325 (65 FE) TAB at 13:49

## 2023-05-24 RX ADMIN — HEPARIN SODIUM 5000 UNITS: 5000 INJECTION INTRAVENOUS; SUBCUTANEOUS at 13:47

## 2023-05-24 RX ADMIN — CALCITRIOL CAPSULES 0.25 MCG 0.5 MCG: 0.25 CAPSULE ORAL at 13:49

## 2023-05-24 RX ADMIN — ISOSORBIDE MONONITRATE 120 MG: 60 TABLET, EXTENDED RELEASE ORAL at 20:50

## 2023-05-24 RX ADMIN — FERROUS SULFATE TAB 325 MG (65 MG ELEMENTAL FE) 325 MG: 325 (65 FE) TAB at 17:41

## 2023-05-24 RX ADMIN — SODIUM CHLORIDE 10 MG/HR: 9 INJECTION, SOLUTION INTRAVENOUS at 10:07

## 2023-05-24 RX ADMIN — ASPIRIN 81 MG: 81 TABLET, COATED ORAL at 13:49

## 2023-05-24 RX ADMIN — FAMOTIDINE 20 MG: 20 TABLET ORAL at 13:50

## 2023-05-24 RX ADMIN — Medication 1 TABLET: at 13:48

## 2023-05-24 RX ADMIN — HEPARIN SODIUM 5000 UNITS: 5000 INJECTION INTRAVENOUS; SUBCUTANEOUS at 06:26

## 2023-05-24 RX ADMIN — VERAPAMIL HYDROCHLORIDE 240 MG: 240 TABLET, FILM COATED, EXTENDED RELEASE ORAL at 13:48

## 2023-05-24 RX ADMIN — DOXAZOSIN 8 MG: 4 TABLET ORAL at 20:50

## 2023-05-24 RX ADMIN — Medication 1000 UNITS: at 13:47

## 2023-05-24 RX ADMIN — SODIUM CHLORIDE 6.5 MG/HR: 9 INJECTION, SOLUTION INTRAVENOUS at 17:58

## 2023-05-24 RX ADMIN — CLONIDINE HYDROCHLORIDE 0.2 MG: 0.2 TABLET ORAL at 20:51

## 2023-05-24 RX ADMIN — ATORVASTATIN CALCIUM 40 MG: 40 TABLET, FILM COATED ORAL at 20:51

## 2023-05-24 RX ADMIN — SODIUM ZIRCONIUM CYCLOSILICATE 10 G: 10 POWDER, FOR SUSPENSION ORAL at 03:45

## 2023-05-24 RX ADMIN — CINACALCET HYDROCHLORIDE 150 MG: 30 TABLET, FILM COATED ORAL at 13:48

## 2023-05-24 RX ADMIN — LABETALOL HYDROCHLORIDE 50 MG: 100 TABLET, FILM COATED ORAL at 20:51

## 2023-05-24 RX ADMIN — HEPARIN SODIUM 5000 UNITS: 5000 INJECTION INTRAVENOUS; SUBCUTANEOUS at 22:19

## 2023-05-24 RX ADMIN — FLUTICASONE PROPIONATE 1 SPRAY: 50 SPRAY, METERED NASAL at 13:49

## 2023-05-24 RX ADMIN — SODIUM CHLORIDE 5 MG/HR: 9 INJECTION, SOLUTION INTRAVENOUS at 01:42

## 2023-05-24 RX ADMIN — SODIUM BICARBONATE 50 MEQ: 84 INJECTION, SOLUTION INTRAVENOUS at 02:47

## 2023-05-24 RX ADMIN — FOLIC ACID 1 MG: 1 TABLET ORAL at 13:49

## 2023-05-24 ASSESSMENT — PAIN DESCRIPTION - LOCATION: LOCATION: HEAD

## 2023-05-24 ASSESSMENT — ENCOUNTER SYMPTOMS
VOMITING: 0
SINUS PAIN: 0
COLOR CHANGE: 0
NAUSEA: 0
SHORTNESS OF BREATH: 1
WHEEZING: 0

## 2023-05-24 ASSESSMENT — PAIN SCALES - GENERAL
PAINLEVEL_OUTOF10: 5
PAINLEVEL_OUTOF10: 0

## 2023-05-24 ASSESSMENT — PAIN DESCRIPTION - DESCRIPTORS: DESCRIPTORS: ACHING

## 2023-05-24 NOTE — ED PROVIDER NOTES
I performed a history and physical examination of the patient and discussed management with the resident. I reviewed the residents note and agree with the documented findings and plan of care. Any areas of disagreement are noted on the chart. I was personally present for the key portions of any procedures. I have documented in the chart those procedures where I was not present during the key portions. I have reviewed the emergency nurses triage note. I agree with the chief complaint, past medical history, past surgical history, allergies, medications, social and family history as documented unless otherwise noted below. Documentation of the HPI, Physical Exam and Medical Decision Making performed by medical students or scribes is based on my personal performance of the HPI, PE and MDM. For Phys Assistant/ Nurse Practitioner cases/documentation I have personally evaluated this patient and have completed at least one if not all key elements of the E/M (history, physical exam, and MDM). My findings are as noted below. In other words, I personally saw and examined the patient I have reviewed and agreed with the resident findings including all diagnostic interpretations and treatment plans as written. I was present for the key portion of any procedures performed and the inclusive time noted in any critical care statement. He reports today for shortness of breath. Patient is a dialysis patient sees Dr. Caleb Riley. Missed his dialysis appointment. He was scheduled to have 1 in the morning. Patient is a Tuesday Thursday Saturday dialysis patient. He states he was out of town. He states that he thinks he overdid on water. He became very short of breath throughout the day he came in tonight complaining of same. Patient has no active chest pain at this time. He is very hypertensive. Oxygen saturation is 92%.   Patient does tell me that he had a partial pneumonectomy performed by Dr. Lara Daugherty as well as a right

## 2023-05-24 NOTE — PROGRESS NOTES
Pt was in bed as his wife was visiting with him. He was dealing with hypertensive emergency. He wanted prayer to cope and heal. He was blessed.     05/24/23 1647   Encounter Summary   Encounter Overview/Reason  Initial Encounter   Service Provided For: Patient and family together   Referral/Consult From: [facility]   Support System Spouse   Last Encounter  05/24/23   Complexity of Encounter Low   Begin Time 1540   End Time  1545   Total Time Calculated 5 min   Spiritual/Emotional needs   Type Spiritual Support   Assessment/Intervention/Outcome   Assessment Hopeful   Intervention Empowerment

## 2023-05-24 NOTE — ED TRIAGE NOTES
Patient presents to ED with chief complaint of shortness of breath that started suddenly this evening. EMS reports patient was 68% on his baseline 3L at home. Patient was placed on 15 liters and given breathing treatment with improvement. Patient states he was supposed to have dialysis today but missed it.

## 2023-05-24 NOTE — PROGRESS NOTES
The transport originated from ED. Pt. was transported to . Assisting with the transport was ruben HAQUE. A defibrillator was brought along on transport. Appropriate devices were applied to monitor the patient's condition during transport. A transport backpack was brought on transport, to be used in case of emergency. Patient transported  via bipap. Patient tolerated procedure well.

## 2023-05-24 NOTE — CONSULTS
[facility]    [address]  [facility]  5/24/2023 9:23 AM    Pt Name:    Massiel Rosales  MRN:     [id number]   [id number]  YOB: 1965  Admit Date:    5/23/2023 10:59 PM  Primary Care Physician:  Birdie Yan, WADE - CNP    [facility] Number:   [id number]    Reason for Consult:  [facility] dialysis treatment. HTN emergency-volume overload. \"  Requesting provider:  Hospitalist    History:   History obtained from patient as well as chart review. This is a 61-year-old male with a past medical history of ESRD on Tuesday/Thursday/Saturday dialysis. Presented to [facility] ED on 05/23/2023 secondary to worsening shortness of breath as well as abdominal and bilateral lower extremity swelling. Patient states that he was out of town and that he had consumed more fluids than he normally would. He states that he also missed his regularly scheduled dialysis on 05/23/2023. He states that his symptoms progressed over the course of the day. Patient was seen and evaluated at the bedside with dialysis in place over the last hour. Patient was also utilizing Pap therapy without complication. He states that he is compliant and benefiting from PAP therapy in the outpatient setting. He denies chest pain, fever, chills, nausea, vomiting, diarrhea. He states that he is having bowel movements regularly without complication. He states that he is making a small amount of urine every other day without complication. He states that his bilateral lower extremity swelling has improved compared to admission. He denies any additional acute symptoms or concerns. Case discussed with primary RN as well as attending nephrologist.    ED documentation reviewed. Patient is apparently a Tuesday/Thursday/Saturday dialysis patient and follows with Dr. Nader Mendiola.   Patient admits to missing his dialysis appointment on 05/23/2023 secondary to being out of town and he endorses

## 2023-05-24 NOTE — PROGRESS NOTES
Orders for BP to be above 180 on Cardene drip for the first 24 hours. Dr. Giblert Rios is okay for patient to wean bipap as patient tolerates. Respiratory made aware to wean when able. Will pass meds once bipap is removed.

## 2023-05-24 NOTE — FLOWSHEET NOTE
05/24/23 0940   Vital Signs   BP (!) 182/73   Temp 98.2 °F (36.8 °C)   Pulse 92   Respirations 20   Weight - Scale 213 lb 13.5 oz (97 kg)   Weight Method Bed scale   Percent Weight Change -4.9   Pain Assessment   Pain Assessment None - Denies Pain   Post-Hemodialysis Assessment   Post-Treatment Procedures Blood returned   Machine Disinfection Process Exterior Machine Disinfection   Rinseback Volume (ml) 400 ml   Blood Volume Processed (Liters) 92 l/min   Dialyzer Clearance Moderately streaked   Duration of Treatment (minutes) 240 minutes   Hemodialysis Output (ml) 5000 ml   Tolerated Treatment Good   Patient Response to Treatment shyla good   Bilateral Breath Sounds Diminished   Edema Generalized   Edema Generalized +1   Time Off 5329   Patient Disposition Remain in ICU/ED     completed 4 hr HD TX and removed 5 Liters of fluid. pt tolerated HD TX well. pt required Bi-pap entire TX. pt left access arm dressing is clean, dry and intact. report given to primary nurse, Kayleigh Chaparro RN. record completed and printed to be scanned into pt's EMR.

## 2023-05-24 NOTE — ED PROVIDER NOTES
[facility] [address] [address] EMERGENCY DEPT      EMERGENCY MEDICINE     Pt Name: Nicci Coreas  MRN: [id number]  [place] 1965  Date of evaluation: 5/23/2023  Provider: Aylin Amin MD    CHIEF COMPLAINT       Chief Complaint   Patient presents with    Shortness of Breath     HISTORY OF PRESENT ILLNESS   Nicci Coreas is a pleasant 66 y.o. male who presents to the emergency department from from home, by private vehicle for evaluation of shortness of breath. Patient states that he started having shortness of breath today on arrival to ED patient noted with O2 at 68% on 3 L. Patient received nonrebreather with improvement to the 90s. Patient states that did not take antihypertensive meds today had missed his Monday session of dialysis. He was last dialyzed 4 days ago. PASTMEDICAL HISTORY     Past Medical History:   Diagnosis Date    AAA (abdominal aortic aneurysm) ([address]     NURIS (acute kidney injury) ([address] 9/24/2015    Anemia associated with chronic renal failure     Arthritis     stated in hands    Cocaine abuse ([address] 5/10/2014    Depression     Diabetes mellitus ([address]     pt states he no longer has diabetes he has lost alot of davion.      FSGS (focal segmental glomerulosclerosis) 5/23/2013    Hemodialysis patient ([address] 10/17/2016    on hemodialysis with [facility] 1/16/2012    History of blood transfusion     Hyperlipidemia     Hyperphosphatemia 5/21/2016    Hypertension     Left renal artery stenosis ([address] 5/22/2014    Monoclonal (M) protein disease, multiple 'M' protein     Nicotine dependence 6/16/2014    Noncompliance     Pneumonia     Psychiatric problem     PTSD (post-traumatic stress disorder)     Pt vet from [place]    Secondary hyperparathyroidism (of renal origin)     Sleep apnea        Patient Active Problem List   Diagnosis Code    FSGS (focal segmental glomerulosclerosis) N05.1    Diabetes mellitus type 2, controlled ([address] E11.9   

## 2023-05-24 NOTE — ED NOTES
Patient resting in bed. Respirations easy and unlabored. No distress noted. Call light within reach. Updated on plan of care at this time. Provided with blanket.       Becky Pace, [place]  05/24/23 2806

## 2023-05-24 NOTE — ED NOTES
Inpatient provider notified at this time of patient's continued hypertension. Orders adjusted.      Eben Lyles RN  05/24/23 8056

## 2023-05-24 NOTE — PROGRESS NOTES
Patient seen at bedside in CCU during dialysis treatment  Discussed with HD RN  Patient tolerating dialysis treatment  Ultrafiltration goal 4 kg  Has already tolerated 2.3 kg of ultrafiltration thus far  On BiPAP  Vitals noted  Blood pressure in systolic 823E  On Cardene drip  Plan to repeat hemodialysis treatment again tomorrow with ultrafiltration  Discussed with patient in detail

## 2023-05-24 NOTE — H&P
BLOODU SMALL 04/03/2018 07:45 PM    SPECGRAV 1.014 03/07/2018 09:10 PM    GLUCOSEU NEGATIVE 04/03/2018 07:45 PM       EKG: NSR with peaked T waves  Radiology:  XR CHEST PORTABLE   Final Result   Slightly progressed lung opacities. Otherwise no acute cardiopulmonary    changes. This document has been electronically signed by: Ge Neri MD on 05/24/2023 12:10 AM        XR CHEST PORTABLE    Result Date: 5/24/2023  1 view chest x-ray Comparison: CR/SR - XR CHEST STANDARD (2 VW) - 02/04/2023 06:28 AM EST Findings: Widespread consolidation both lungs. Bilateral small-to-moderate pleural effusions, larger on the right. Cardiomegaly. Enlarged aortic arch with stent graft in place. No acute fracture. Slightly progressed lung opacities. Otherwise no acute cardiopulmonary changes. This document has been electronically signed by: Ge Neri MD on 05/24/2023 12:10 AM        Tele:   [x] yes             [] no      Thank you WADE Costello CNP for the opportunity to be involved in this patient's care.     Electronically signed by WADE Segura CNP on 5/24/2023 at 12:20 AM Complex Repair And Dermal Autograft Text: The defect edges were debeveled with a #15 scalpel blade.  The primary defect was closed partially with a complex linear closure.  Given the location of the defect, shape of the defect and the proximity to free margins an dermal autograft was deemed most appropriate to repair the remaining defect.  The graft was trimmed to fit the size of the remaining defect.  The graft was then placed in the primary defect, oriented appropriately, and sutured into place.

## 2023-05-24 NOTE — PROGRESS NOTES
Pt admitted to  3A4 from ED. IV site free of s/s of infection or infiltration. Vital signs obtained. Assessment and data collection initiated. Two nurse skin assessment performed by Katy Santiago and Yee Berry. Oriented to room. Policies and procedures for 3A explained. All questions answered with no further questions at this time. Fall prevention and safety brochure discussed with patient. Bed alarm on. Call light in reach.

## 2023-05-25 VITALS
TEMPERATURE: 97 F | OXYGEN SATURATION: 94 % | HEART RATE: 84 BPM | HEIGHT: 75 IN | SYSTOLIC BLOOD PRESSURE: 149 MMHG | BODY MASS INDEX: 26.37 KG/M2 | DIASTOLIC BLOOD PRESSURE: 73 MMHG | WEIGHT: 212.08 LBS | RESPIRATION RATE: 16 BRPM

## 2023-05-25 LAB
ANION GAP SERPL CALC-SCNC: 11 MEQ/L (ref 8–16)
ANION GAP SERPL CALC-SCNC: 11 MEQ/L (ref 8–16)
ANION GAP SERPL CALC-SCNC: 12 MEQ/L (ref 8–16)
BASOPHILS ABSOLUTE: 0 THOU/MM3 (ref 0–0.1)
BASOPHILS NFR BLD AUTO: 0.4 %
BUN SERPL-MCNC: 18 MG/DL (ref 7–22)
BUN SERPL-MCNC: 37 MG/DL (ref 7–22)
BUN SERPL-MCNC: 39 MG/DL (ref 7–22)
CALCIUM SERPL-MCNC: 7.4 MG/DL (ref 8.5–10.5)
CALCIUM SERPL-MCNC: 7.6 MG/DL (ref 8.5–10.5)
CALCIUM SERPL-MCNC: 7.7 MG/DL (ref 8.5–10.5)
CHLORIDE SERPL-SCNC: 102 MEQ/L (ref 98–111)
CHLORIDE SERPL-SCNC: 102 MEQ/L (ref 98–111)
CHLORIDE SERPL-SCNC: 104 MEQ/L (ref 98–111)
CO2 SERPL-SCNC: 25 MEQ/L (ref 23–33)
CO2 SERPL-SCNC: 25 MEQ/L (ref 23–33)
CO2 SERPL-SCNC: 26 MEQ/L (ref 23–33)
CREAT SERPL-MCNC: 4.5 MG/DL (ref 0.4–1.2)
CREAT SERPL-MCNC: 7.3 MG/DL (ref 0.4–1.2)
CREAT SERPL-MCNC: 7.7 MG/DL (ref 0.4–1.2)
DEPRECATED RDW RBC AUTO: 56.8 FL (ref 35–45)
EOSINOPHIL NFR BLD AUTO: 3.1 %
EOSINOPHILS ABSOLUTE: 0.1 THOU/MM3 (ref 0–0.4)
ERYTHROCYTE [DISTWIDTH] IN BLOOD BY AUTOMATED COUNT: 14.3 % (ref 11.5–14.5)
FERRITIN SERPL IA-MCNC: 1377 NG/ML (ref 22–322)
FOLATE SERPL-MCNC: 16.8 NG/ML (ref 4.8–24.2)
GFR SERPL CREATININE-BSD FRML MDRD: 14 ML/MIN/1.73M2
GFR SERPL CREATININE-BSD FRML MDRD: 8 ML/MIN/1.73M2
GFR SERPL CREATININE-BSD FRML MDRD: 8 ML/MIN/1.73M2
GLUCOSE SERPL-MCNC: 102 MG/DL (ref 70–108)
GLUCOSE SERPL-MCNC: 137 MG/DL (ref 70–108)
GLUCOSE SERPL-MCNC: 194 MG/DL (ref 70–108)
HCT VFR BLD AUTO: 32.2 % (ref 42–52)
HGB BLD-MCNC: 9.5 GM/DL (ref 14–18)
HGB RETIC QN AUTO: 31.1 PG (ref 28.2–35.7)
IMM GRANULOCYTES # BLD AUTO: 0.01 THOU/MM3 (ref 0–0.07)
IMM GRANULOCYTES NFR BLD AUTO: 0.2 %
IMM RETICS NFR: 28.3 % (ref 2.3–13.4)
IRON SATN MFR SERPL: 45 % (ref 20–50)
IRON SERPL-MCNC: 61 UG/DL (ref 65–195)
LYMPHOCYTES ABSOLUTE: 0.8 THOU/MM3 (ref 1–4.8)
LYMPHOCYTES NFR BLD AUTO: 16.4 %
MCH RBC QN AUTO: 32 PG (ref 26–33)
MCHC RBC AUTO-ENTMCNC: 29.5 GM/DL (ref 32.2–35.5)
MCV RBC AUTO: 108.4 FL (ref 80–94)
MONOCYTES ABSOLUTE: 0.5 THOU/MM3 (ref 0.4–1.3)
MONOCYTES NFR BLD AUTO: 11.6 %
NEUTROPHILS NFR BLD AUTO: 68.3 %
NRBC BLD AUTO-RTO: 0 /100 WBC
PLATELET # BLD AUTO: 135 THOU/MM3 (ref 130–400)
PMV BLD AUTO: 10 FL (ref 9.4–12.4)
POTASSIUM SERPL-SCNC: 4.7 MEQ/L (ref 3.5–5.2)
POTASSIUM SERPL-SCNC: 5.5 MEQ/L (ref 3.5–5.2)
POTASSIUM SERPL-SCNC: 5.5 MEQ/L (ref 3.5–5.2)
RBC # BLD AUTO: 2.97 MILL/MM3 (ref 4.7–6.1)
RETICS # AUTO: 86 THOU/MM3 (ref 20–115)
RETICS/RBC NFR AUTO: 2.9 % (ref 0.5–2)
SEGMENTED NEUTROPHILS ABSOLUTE COUNT: 3.1 THOU/MM3 (ref 1.8–7.7)
SODIUM SERPL-SCNC: 139 MEQ/L (ref 135–145)
SODIUM SERPL-SCNC: 139 MEQ/L (ref 135–145)
SODIUM SERPL-SCNC: 140 MEQ/L (ref 135–145)
TIBC SERPL-MCNC: 137 UG/DL (ref 171–450)
VIT B12 SERPL-MCNC: 358 PG/ML (ref 211–911)
WBC # BLD AUTO: 4.6 THOU/MM3 (ref 4.8–10.8)

## 2023-05-25 PROCEDURE — 6370000000 HC RX 637 (ALT 250 FOR IP)

## 2023-05-25 PROCEDURE — 36415 COLL VENOUS BLD VENIPUNCTURE: CPT

## 2023-05-25 PROCEDURE — 6370000000 HC RX 637 (ALT 250 FOR IP): Performed by: STUDENT IN AN ORGANIZED HEALTH CARE EDUCATION/TRAINING PROGRAM

## 2023-05-25 PROCEDURE — 90935 HEMODIALYSIS ONE EVALUATION: CPT | Performed by: INTERNAL MEDICINE

## 2023-05-25 PROCEDURE — 83550 IRON BINDING TEST: CPT

## 2023-05-25 PROCEDURE — 82728 ASSAY OF FERRITIN: CPT

## 2023-05-25 PROCEDURE — 90935 HEMODIALYSIS ONE EVALUATION: CPT

## 2023-05-25 PROCEDURE — 85025 COMPLETE CBC W/AUTO DIFF WBC: CPT

## 2023-05-25 PROCEDURE — 83540 ASSAY OF IRON: CPT

## 2023-05-25 PROCEDURE — 2580000003 HC RX 258

## 2023-05-25 PROCEDURE — 82607 VITAMIN B-12: CPT

## 2023-05-25 PROCEDURE — 85046 RETICYTE/HGB CONCENTRATE: CPT

## 2023-05-25 PROCEDURE — 80048 BASIC METABOLIC PNL TOTAL CA: CPT

## 2023-05-25 PROCEDURE — 6370000000 HC RX 637 (ALT 250 FOR IP): Performed by: INTERNAL MEDICINE

## 2023-05-25 PROCEDURE — 82746 ASSAY OF FOLIC ACID SERUM: CPT

## 2023-05-25 PROCEDURE — 99239 HOSP IP/OBS DSCHRG MGMT >30: CPT | Performed by: STUDENT IN AN ORGANIZED HEALTH CARE EDUCATION/TRAINING PROGRAM

## 2023-05-25 PROCEDURE — 6360000002 HC RX W HCPCS

## 2023-05-25 RX ORDER — FERROUS SULFATE 325(65) MG
325 TABLET ORAL
Qty: 30 TABLET | Refills: 3 | Status: SHIPPED | OUTPATIENT
Start: 2023-05-25

## 2023-05-25 RX ORDER — CLONIDINE HYDROCHLORIDE 0.3 MG/1
0.3 TABLET ORAL 3 TIMES DAILY
Qty: 60 TABLET | Refills: 3 | Status: SHIPPED | OUTPATIENT
Start: 2023-05-25

## 2023-05-25 RX ORDER — VERAPAMIL HYDROCHLORIDE 240 MG/1
240 TABLET, FILM COATED, EXTENDED RELEASE ORAL DAILY
Qty: 30 TABLET | Refills: 3 | Status: SHIPPED | OUTPATIENT
Start: 2023-05-25

## 2023-05-25 RX ORDER — ISOSORBIDE MONONITRATE 120 MG/1
120 TABLET, EXTENDED RELEASE ORAL 2 TIMES DAILY
Qty: 30 TABLET | Refills: 3 | Status: SHIPPED | OUTPATIENT
Start: 2023-05-25

## 2023-05-25 RX ORDER — FOLIC ACID 1 MG/1
1 TABLET ORAL DAILY
Qty: 30 TABLET | Refills: 3 | Status: SHIPPED | OUTPATIENT
Start: 2023-05-25

## 2023-05-25 RX ORDER — ATORVASTATIN CALCIUM 40 MG/1
40 TABLET, FILM COATED ORAL NIGHTLY
Qty: 30 TABLET | Refills: 3 | Status: SHIPPED | OUTPATIENT
Start: 2023-05-25

## 2023-05-25 RX ORDER — LABETALOL 100 MG/1
100 TABLET, FILM COATED ORAL 2 TIMES DAILY
Qty: 60 TABLET | Refills: 3 | Status: SHIPPED | OUTPATIENT
Start: 2023-05-25

## 2023-05-25 RX ORDER — CALCITRIOL 0.5 UG/1
0.5 CAPSULE, LIQUID FILLED ORAL
Qty: 30 CAPSULE | Refills: 3 | Status: SHIPPED | OUTPATIENT
Start: 2023-05-26

## 2023-05-25 RX ORDER — CINACALCET 30 MG/1
150 TABLET, FILM COATED ORAL
Qty: 30 TABLET | Refills: 3 | Status: SHIPPED | OUTPATIENT
Start: 2023-05-26

## 2023-05-25 RX ORDER — LABETALOL 100 MG/1
100 TABLET, FILM COATED ORAL 2 TIMES DAILY
Status: DISCONTINUED | OUTPATIENT
Start: 2023-05-25 | End: 2023-05-25 | Stop reason: HOSPADM

## 2023-05-25 RX ADMIN — DOXAZOSIN 8 MG: 4 TABLET ORAL at 13:03

## 2023-05-25 RX ADMIN — Medication 1 TABLET: at 13:02

## 2023-05-25 RX ADMIN — LABETALOL HYDROCHLORIDE 100 MG: 100 TABLET, FILM COATED ORAL at 12:58

## 2023-05-25 RX ADMIN — SODIUM CHLORIDE, PRESERVATIVE FREE 10 ML: 5 INJECTION INTRAVENOUS at 13:06

## 2023-05-25 RX ADMIN — CLONIDINE HYDROCHLORIDE 0.3 MG: 0.1 TABLET ORAL at 13:00

## 2023-05-25 RX ADMIN — FAMOTIDINE 20 MG: 20 TABLET ORAL at 12:58

## 2023-05-25 RX ADMIN — Medication 1000 UNITS: at 13:04

## 2023-05-25 RX ADMIN — FERROUS SULFATE TAB 325 MG (65 MG ELEMENTAL FE) 325 MG: 325 (65 FE) TAB at 13:05

## 2023-05-25 RX ADMIN — SERTRALINE HYDROCHLORIDE 100 MG: 100 TABLET ORAL at 13:04

## 2023-05-25 RX ADMIN — HEPARIN SODIUM 5000 UNITS: 5000 INJECTION INTRAVENOUS; SUBCUTANEOUS at 05:55

## 2023-05-25 RX ADMIN — ISOSORBIDE MONONITRATE 120 MG: 60 TABLET, EXTENDED RELEASE ORAL at 13:02

## 2023-05-25 RX ADMIN — ASPIRIN 81 MG: 81 TABLET, COATED ORAL at 12:58

## 2023-05-25 RX ADMIN — FOLIC ACID 1 MG: 1 TABLET ORAL at 13:04

## 2023-05-25 RX ADMIN — VERAPAMIL HYDROCHLORIDE 240 MG: 240 TABLET, FILM COATED, EXTENDED RELEASE ORAL at 13:03

## 2023-05-25 ASSESSMENT — PAIN SCALES - GENERAL: PAINLEVEL_OUTOF10: 0

## 2023-05-25 NOTE — CARE COORDINATION
5/25/23, 12:02 PM EDT    Patient goals/plan/ treatment preferences discussed by  and . Patient goals/plan/ treatment preferences reviewed with patient/ family. Patient/ family verbalize understanding of discharge plan and are in agreement with goal/plan/treatment preferences. Understanding was demonstrated using the teach back method. AVS provided by RN at time of discharge, which includes all necessary medical information pertaining to the patients current course of illness, treatment, post-discharge goals of care, and treatment preferences.      Services At/After Discharge: None       IMM Letter  IMM Letter given to Patient/Family/Significant other/Guardian/POA/by[de-identified] Steven Schwarz RN CM  IMM Letter date given[de-identified] 05/24/23  IMM Letter time given[de-identified] 2049  Observation Status Letter date given[de-identified] 05/24/23  Observation Status Letter time given[de-identified] 0030  Observation Status Letter given to Patient/Family/Significant other/Guardian/POA/by[de-identified] patient registration

## 2023-05-25 NOTE — PROGRESS NOTES
[facility]   Nephrology progress note  5/25/2023, 10:02 AM      Pt Name:    Tyra De Jesus  MRN:     [id number]     YOB: 1965  Admit Date:    5/23/2023 10:59 PM    Chief Complaint: Nephrology following for ESRD and hemodialysis    Subjective:  Patient was seen and examined this morning  Seen in the dialysis unit  Ultrafiltration goal increased to 4.5 kg    Objective:  24HR INTAKE/OUTPUT:    Intake/Output Summary (Last 24 hours) at 5/25/2023 1002  Last data filed at 5/24/2023 1451  Gross per 24 hour   Intake 1014.49 ml   Output --   Net 1014.49 ml         I/O last 3 completed shifts: In: 1014.5 [P.O.:120; I.V.:894.5]  Out: 5000   No intake/output data recorded.    Admission weight: 224 lb 13.9 oz (102 kg)  Wt Readings from Last 3 Encounters:   05/25/23 218 lb 11.1 oz (99.2 kg)   04/03/23 214 lb 1.1 oz (97.1 kg)   02/07/23 238 lb 12.1 oz (108.3 kg)        Vitals :   Vitals:    05/25/23 0445 05/25/23 0500 05/25/23 0600 05/25/23 0802   BP: (!) 156/72 (!) 156/74 (!) 150/73 (!) 177/89   Pulse: 75 75 72 85   Resp: 13 18 18 16   Temp:   98 °F (36.7 °C) 97.8 °F (36.6 °C)   TempSrc:   Oral    SpO2: 97% 97% 97% 98%   Weight:   214 lb 1.6 oz (97.1 kg) 218 lb 11.1 oz (99.2 kg)   Height:           Physical examination  General Appearance: alert and cooperative with exam, appears comfortable, no distress  Mouth/Throat: Oral mucosa moist  Neck: No JVD  Lungs: no use of accessory muscles  Heart:  S1, S2 heard  GI: soft, non-tender, no guarding  Extremities: + LE edema    Medications:  Infusion:    sodium chloride       Meds:    aspirin  81 mg Oral Daily    atorvastatin  40 mg Oral Nightly    cinacalcet  150 mg Oral Once per day on Mon Wed Fri    cloNIDine  0.2 mg Oral TID    doxazosin  8 mg Oral 2 times per day    famotidine  20 mg Oral Daily    ferrous sulfate  325 mg Oral TID     fluticasone  1 spray Nasal Daily    folic acid  1 mg Oral Daily    isosorbide mononitrate  120 mg Oral BID

## 2023-05-25 NOTE — DISCHARGE SUMMARY
Hospitalist Discharge Summary    Patient Identification:   Tyra De Jesus   : 1965  MRN: [id number]   Account: [de-identified]    Patient's PCP: WADE Al CNP  Admit Date: 2023   Discharge Date:   23  Admitting Physician: Marcos Douglas MD   Discharge Physician: Naima Mays DO     Discharge Diagnoses: Active Hospital Problems    Diagnosis Date Noted    Noncompliance of patient with dietary regimen [Z91.119] 2023    Hypertensive emergency [I16.1] 2018    Acute congestive heart failure ([facility] [I50.9]      Hypertensive Emergency: Resolved. Initially presented with /166 in the context of pulmonary edema/fluid overload identified on CXR. Secondary to missed dialysis. Patient was noted to be on multiple antihypertensive medications (cardura, clonidine, imdur, labetalol, verapamil). Was started on IV cardene drip overnight, which has been weaned off. Following restarting of home antihypertensive medications, the patient's BP has gone back to -160s, which is a significant improvement compared to his original BP and is overall stable. - Increase Labetalol to 100 mg BID.  - Continue all other home antihypertensive medications. - Encourage compliance with dialysis for ESRD to prevent future episodes. Acute on Chronic Hypoxic and Chronic Hypercapnic Respiratory Failure: Stabilized, back to baseline. On 2-3 L NC at baseline. Initially presented with hypoxia requiring up to 5 L NC. Secondary to missed dialysis and subsequent fluid overload/flash pulmonary edema, with underlying chronic hypercapnia due to REZA based on ABG that appears to be at baseline. Last ECHO on 22 showed EF 60% with severe LV hypertrophy. Following dialysis session and treatment of hypertension, the patient's hypoxia has improved and he is back to his baseline levels. - Treatment of underlying conditions as described below.     Severe LV Outflow Tract Obstruction: Acquired, secondary to

## 2023-05-25 NOTE — PROGRESS NOTES
BMP resulted and reported to Dr Bertha Reddy. Ok to go home. Discharged per wheelchair with family. Pleasant and cooperative.

## 2023-05-25 NOTE — PLAN OF CARE
Problem: Discharge Planning  Goal: Discharge to home or other facility with appropriate resources  5/25/2023 0136 by Rich Christiansen RN  Outcome: Progressing  Flowsheets (Taken 5/25/2023 0136)  Discharge to home or other facility with appropriate resources:   Identify barriers to discharge with patient and caregiver   Identify discharge learning needs (meds, wound care, etc)   Arrange for needed discharge resources and transportation as appropriate  5/24/2023 1938 by Edmundo Sears RN  Outcome: Progressing  Flowsheets (Taken 5/24/2023 0930)  Discharge to home or other facility with appropriate resources:   Identify barriers to discharge with patient and caregiver   Arrange for needed discharge resources and transportation as appropriate   Identify discharge learning needs (meds, wound care, etc)     Problem: Chronic Conditions and Co-morbidities  Goal: Patient's chronic conditions and co-morbidity symptoms are monitored and maintained or improved  5/25/2023 0136 by Rich Christiansen RN  Outcome: Progressing  Flowsheets (Taken 5/25/2023 0136)  Care Plan - Patient's Chronic Conditions and Co-Morbidity Symptoms are Monitored and Maintained or Improved:   Monitor and assess patient's chronic conditions and comorbid symptoms for stability, deterioration, or improvement   Update acute care plan with appropriate goals if chronic or comorbid symptoms are exacerbated and prevent overall improvement and discharge   Collaborate with multidisciplinary team to address chronic and comorbid conditions and prevent exacerbation or deterioration  5/24/2023 1938 by Edmundo Sears RN  Outcome: Progressing  Flowsheets (Taken 5/24/2023 0930)  Care Plan - Patient's Chronic Conditions and Co-Morbidity Symptoms are Monitored and Maintained or Improved:   Monitor and assess patient's chronic conditions and comorbid symptoms for stability, deterioration, or improvement   Collaborate with multidisciplinary team to address chronic and comorbid
Problem: Discharge Planning  Goal: Discharge to home or other facility with appropriate resources  Outcome: Progressing  Flowsheets (Taken 5/24/2023 0930)  Discharge to home or other facility with appropriate resources:   Identify barriers to discharge with patient and caregiver   Arrange for needed discharge resources and transportation as appropriate   Identify discharge learning needs (meds, wound care, etc)     Problem: Chronic Conditions and Co-morbidities  Goal: Patient's chronic conditions and co-morbidity symptoms are monitored and maintained or improved  Outcome: Progressing  Flowsheets (Taken 5/24/2023 0930)  Care Plan - Patient's Chronic Conditions and Co-Morbidity Symptoms are Monitored and Maintained or Improved:   Monitor and assess patient's chronic conditions and comorbid symptoms for stability, deterioration, or improvement   Collaborate with multidisciplinary team to address chronic and comorbid conditions and prevent exacerbation or deterioration   Update acute care plan with appropriate goals if chronic or comorbid symptoms are exacerbated and prevent overall improvement and discharge     Problem: Respiratory - Adult  Goal: Achieves optimal ventilation and oxygenation  5/24/2023 1938 by Rodney Thomas RN  Outcome: Progressing  5/24/2023 1420 by Amanda Meyer RCP  Outcome: Progressing  Flowsheets (Taken 5/24/2023 0930 by Rodney Thomas RN)  Achieves optimal ventilation and oxygenation:   Assess for changes in respiratory status   Assess for changes in mentation and behavior   Position to facilitate oxygenation and minimize respiratory effort     Problem: Safety - Adult  Goal: Free from fall injury  Outcome: Progressing
Problem: Respiratory - Adult  Goal: Achieves optimal ventilation and oxygenation  Outcome: Progressing     Patient able to come off BiLevel support to a NC today. Patient doing well. Will continue to evaluate patient for bilevel needs. Patient did complete dialysis today.
1 spray Nasal Daily    folic acid  1 mg Oral Daily    [Held by provider] isosorbide mononitrate  120 mg Oral BID    labetalol  50 mg Oral BID    multivitamin  1 tablet Oral Daily    sertraline  100 mg Oral Daily    verapamil  240 mg Oral Daily    Vitamin D  1,000 Units Oral BID    vitamin E  400 Units Oral Daily    sodium chloride flush  5-40 mL IntraVENous 2 times per day    heparin (porcine)  5,000 Units SubCUTAneous 3 times per day    calcitRIOL  0.5 mcg Oral Once per day on Mon Wed Fri     PRN Meds: albuterol sulfate HFA, traZODone, sodium chloride flush, sodium chloride      Intake/Output Summary (Last 24 hours) at 5/24/2023 1320  Last data filed at 5/24/2023 0940  Gross per 24 hour   Intake --   Output 5000 ml   Net -5000 ml       Diet:  ADULT DIET; Regular; Low Sodium (2 gm); Low Potassium (Less than 3000 mg/day); 1500 ml    Exam:  BP (!) 189/76   Pulse 91   Temp 98.1 °F (36.7 °C) (Oral)   Resp 17   Ht 6' 3\" (1.905 m)   Wt 213 lb 13.5 oz (97 kg)   SpO2 94%   BMI 26.73 kg/m²     General appearance: No apparent distress, appears stated age and cooperative. HEENT: Pupils equal, round, and reactive to light. Conjunctivae/corneas clear. Neck: Supple, with full range of motion. No jugular venous distention. Trachea midline. Respiratory:  Normal respiratory effort. Clear to auscultation, bilaterally without Rales/Wheezes/Rhonchi. Cardiovascular: Regular rate and rhythm with normal S1/S2 without rubs or gallops. Chronic \"murmur-like\" sound noted that has been present for years per patient. Abdomen: Soft, non-tender, non-distended with normal bowel sounds. Musculoskeletal: passive and active ROM x 4 extremities. Trace pitting edema noted in bilateral LE's. Skin: Skin color, texture, turgor normal.  No rashes or lesions. Neurologic: AAO x3. No obvious FNDs noted.   Psychiatric: Thought content appropriate, normal insight  Capillary Refill: Brisk,< 3 seconds   Peripheral Pulses: +2 palpable, equal

## 2023-05-25 NOTE — PROGRESS NOTES
This virtual RN completed discharge teaching with patient. Patient education provided on follow ups, medications and their side effects, daily weights, low sodium diet, fluid restriction, and signs to call the doctor or return to the ED. Patient verbalized understanding.

## 2023-05-25 NOTE — FLOWSHEET NOTE
05/25/23 1225   Vital Signs   BP (!) 168/81   Temp 98 °F (36.7 °C)   Pulse 81   Respirations 16   SpO2 96 %   Weight - Scale 212 lb 1.3 oz (96.2 kg)   Weight Method Bed scale   Percent Weight Change -3.02   Post-Hemodialysis Assessment   Post-Treatment Procedures Blood returned; Access bleeding time < 10 minutes   Machine Disinfection Process Acid/Vinegar Clean;Heat Disinfect; Exterior Machine Disinfection   Rinseback Volume (ml) 400 ml   Blood Volume Processed (Liters) 92 l/min   Dialyzer Clearance Lightly streaked   Duration of Treatment (minutes) 240 minutes   Heparin Amount Administered During Treatment (mL) 0 mL   Hemodialysis Intake (ml) 300 ml   Hemodialysis Output (ml) 3300 ml   NET Removed (ml) 3000     4 hour treatment completed. 3 L of fluid removed. Attempted 4.5 L per Dr. Gregory Wing but patient was cramping and UF turned down at that time per pt. request. Pressure held to access for 10 mins x2. Dressing clean/dry and intact upon leaving the unit. Report given to primary RN. Charting printed and placed in bin to be scanned into EMR.

## 2023-05-25 NOTE — PROGRESS NOTES
Report from [facility]. Pt arrived from ICU via wheelchair. Oriented to room. A/Ox4, denies pain/discomfort. VSS. Telemetry applied, NSR with PVCs. Early breakfast given, for Hemodialysis around 0700 hrs.

## 2023-05-25 NOTE — DISCHARGE INSTR - DIET
Good nutrition is important when healing from an illness, injury, or surgery. Follow any nutrition recommendations given to you during your hospital stay. If you were given an oral nutrition supplement while in the hospital, continue to take this supplement at home. You can take it with meals, in-between meals, and/or before bedtime. These supplements can be purchased at most local grocery stores, pharmacies, and chain super-stores. If you have any questions about your diet or nutrition, call the hospital and ask for the dietitian. You are recommended to follow a renal (kidney) diet    What foods are good to eat? Eat food rich in calories and protein. Meats and proteins like:  Beef  Fish  Poultry  Pork  Eggs  Breads and grains like:  Cereals  Bread  Milk products like: Yogurt  Cream cheese  Ricotta cheese  Butter  Margarine  Heavy cream  Sherbet  Fruits like:  Apples  Berries  Cherries  Grapes  Peaches  Pears  Pineapples  Plums  Veggies like:  Broccoli  Cabbage  Carrots  Cauliflower  Celery  Cucumber  Eggplant  Lettuce  Peppers  Radish  Zucchini  Yellow squash  Talk to your doctor about these foods if you need to control your blood sugar. What foods should be limited or avoided? Stay away from food high in water, potassium, phosphorus, and sodium or salt. If potassium builds up in your blood, it may cause heart problems. Phosphorus, in large amount in your blood, can take away the calcium in your blood. This may weaken your bones. Salt or sodium can trap fluids in your body and cause high blood pressure.  Limit or stay away from eating these foods:  Breads and grains like:  Whole wheat bread  Brown rice  Fruits like:  Oranges  Kiwis  Prunes  Raisins  Bananas  Melons  Veggies like:  Potatoes  Tomatoes  Winter squash  Pumpkin  Asparagus  Avocados  Beets  Spinach  Parsnips  Seasonings like:  Soy sauce  Teriyaki sauce  Other foods and drinks like:  Canned foods  Chips  Restaurant

## 2023-05-26 LAB — BACTERIA SPEC AEROBE CULT: NORMAL

## 2023-05-30 LAB
EKG ATRIAL RATE: 88 BPM
EKG ATRIAL RATE: 93 BPM
EKG P AXIS: 52 DEGREES
EKG P AXIS: 57 DEGREES
EKG P-R INTERVAL: 196 MS
EKG P-R INTERVAL: 210 MS
EKG Q-T INTERVAL: 404 MS
EKG Q-T INTERVAL: 406 MS
EKG QRS DURATION: 102 MS
EKG QRS DURATION: 110 MS
EKG QTC CALCULATION (BAZETT): 488 MS
EKG QTC CALCULATION (BAZETT): 504 MS
EKG R AXIS: -43 DEGREES
EKG R AXIS: -46 DEGREES
EKG T AXIS: 96 DEGREES
EKG T AXIS: 98 DEGREES
EKG VENTRICULAR RATE: 88 BPM
EKG VENTRICULAR RATE: 93 BPM

## 2023-08-24 ENCOUNTER — TELEPHONE (OUTPATIENT)
Dept: NEPHROLOGY | Age: 58
End: 2023-08-24

## 2023-08-24 NOTE — TELEPHONE ENCOUNTER
Chaz Devries called this afternoon asking to speak to Dr. Olu Encarnacion. He would not give me any information on any concerns. He stated \"It is personal.\"  I explained that [address] [place] [address] [email or web address] is with a patient and I will route a message to him.   Call back number is [phone]

## 2023-09-07 ENCOUNTER — HOSPITAL ENCOUNTER (INPATIENT)
Age: 58
LOS: 5 days | Discharge: HOME | DRG: 091 | End: 2023-09-12
Attending: EMERGENCY MEDICINE | Admitting: INTERNAL MEDICINE
Payer: MEDICARE

## 2023-09-07 ENCOUNTER — APPOINTMENT (OUTPATIENT)
Dept: GENERAL RADIOLOGY | Age: 58
DRG: 091 | End: 2023-09-07
Payer: MEDICARE

## 2023-09-07 DIAGNOSIS — I10 HYPERTENSION, UNSPECIFIED TYPE: ICD-10-CM

## 2023-09-07 DIAGNOSIS — R07.9 CHEST PAIN, UNSPECIFIED TYPE: Primary | ICD-10-CM

## 2023-09-07 DIAGNOSIS — J18.9 PNEUMONIA OF RIGHT LOWER LOBE DUE TO INFECTIOUS ORGANISM: ICD-10-CM

## 2023-09-07 DIAGNOSIS — G89.12 POST-THORACOTOMY PAIN SYNDROME: ICD-10-CM

## 2023-09-07 LAB
ANION GAP SERPL CALC-SCNC: 12 MEQ/L (ref 8–16)
BASOPHILS ABSOLUTE: 0 THOU/MM3 (ref 0–0.1)
BASOPHILS NFR BLD AUTO: 0.6 %
BUN SERPL-MCNC: 20 MG/DL (ref 7–22)
CALCIUM SERPL-MCNC: 9 MG/DL (ref 8.5–10.5)
CHLORIDE SERPL-SCNC: 100 MEQ/L (ref 98–111)
CO2 SERPL-SCNC: 29 MEQ/L (ref 23–33)
CREAT SERPL-MCNC: 5 MG/DL (ref 0.4–1.2)
DEPRECATED RDW RBC AUTO: 55.6 FL (ref 35–45)
EOSINOPHIL NFR BLD AUTO: 3.2 %
EOSINOPHILS ABSOLUTE: 0.2 THOU/MM3 (ref 0–0.4)
ERYTHROCYTE [DISTWIDTH] IN BLOOD BY AUTOMATED COUNT: 14.3 % (ref 11.5–14.5)
GFR SERPL CREATININE-BSD FRML MDRD: 13 ML/MIN/1.73M2
GLUCOSE SERPL-MCNC: 100 MG/DL (ref 70–108)
HCT VFR BLD AUTO: 41 % (ref 42–52)
HGB BLD-MCNC: 12.3 GM/DL (ref 14–18)
IMM GRANULOCYTES # BLD AUTO: 0.01 THOU/MM3 (ref 0–0.07)
IMM GRANULOCYTES NFR BLD AUTO: 0.2 %
LYMPHOCYTES ABSOLUTE: 0.8 THOU/MM3 (ref 1–4.8)
LYMPHOCYTES NFR BLD AUTO: 15.6 %
MAGNESIUM SERPL-MCNC: 2.2 MG/DL (ref 1.6–2.4)
MCH RBC QN AUTO: 31.5 PG (ref 26–33)
MCHC RBC AUTO-ENTMCNC: 30 GM/DL (ref 32.2–35.5)
MCV RBC AUTO: 104.9 FL (ref 80–94)
MONOCYTES ABSOLUTE: 0.5 THOU/MM3 (ref 0.4–1.3)
MONOCYTES NFR BLD AUTO: 9.1 %
NEUTROPHILS NFR BLD AUTO: 71.3 %
NRBC BLD AUTO-RTO: 0 /100 WBC
NT-PROBNP SERPL IA-MCNC: ABNORMAL PG/ML (ref 0–124)
OSMOLALITY SERPL CALC.SUM OF ELEC: 284 MOSMOL/KG (ref 275–300)
PLATELET # BLD AUTO: 141 THOU/MM3 (ref 130–400)
PMV BLD AUTO: 9.5 FL (ref 9.4–12.4)
POTASSIUM SERPL-SCNC: 3.3 MEQ/L (ref 3.5–5.2)
RBC # BLD AUTO: 3.91 MILL/MM3 (ref 4.7–6.1)
SEGMENTED NEUTROPHILS ABSOLUTE COUNT: 3.8 THOU/MM3 (ref 1.8–7.7)
SODIUM SERPL-SCNC: 141 MEQ/L (ref 135–145)
TROPONIN, HIGH SENSITIVITY: 118 NG/L (ref 0–12)
TROPONIN, HIGH SENSITIVITY: 118 NG/L (ref 0–12)
WBC # BLD AUTO: 5.3 THOU/MM3 (ref 4.8–10.8)

## 2023-09-07 PROCEDURE — 83735 ASSAY OF MAGNESIUM: CPT

## 2023-09-07 PROCEDURE — 93010 ELECTROCARDIOGRAM REPORT: CPT | Performed by: INTERNAL MEDICINE

## 2023-09-07 PROCEDURE — 96374 THER/PROPH/DIAG INJ IV PUSH: CPT

## 2023-09-07 PROCEDURE — 85025 COMPLETE CBC W/AUTO DIFF WBC: CPT

## 2023-09-07 PROCEDURE — 1200000003 HC TELEMETRY R&B

## 2023-09-07 PROCEDURE — 6370000000 HC RX 637 (ALT 250 FOR IP)

## 2023-09-07 PROCEDURE — 93005 ELECTROCARDIOGRAM TRACING: CPT

## 2023-09-07 PROCEDURE — 6360000002 HC RX W HCPCS

## 2023-09-07 PROCEDURE — 80048 BASIC METABOLIC PNL TOTAL CA: CPT

## 2023-09-07 PROCEDURE — 99285 EMERGENCY DEPT VISIT HI MDM: CPT

## 2023-09-07 PROCEDURE — 83880 ASSAY OF NATRIURETIC PEPTIDE: CPT

## 2023-09-07 PROCEDURE — 36415 COLL VENOUS BLD VENIPUNCTURE: CPT

## 2023-09-07 PROCEDURE — 99223 1ST HOSP IP/OBS HIGH 75: CPT

## 2023-09-07 PROCEDURE — 84484 ASSAY OF TROPONIN QUANT: CPT

## 2023-09-07 PROCEDURE — 99223 1ST HOSP IP/OBS HIGH 75: CPT | Performed by: INTERNAL MEDICINE

## 2023-09-07 PROCEDURE — 71045 X-RAY EXAM CHEST 1 VIEW: CPT

## 2023-09-07 PROCEDURE — 2580000003 HC RX 258

## 2023-09-07 RX ORDER — NITROGLYCERIN 20 MG/100ML
5-200 INJECTION INTRAVENOUS CONTINUOUS
Status: DISCONTINUED | OUTPATIENT
Start: 2023-09-07 | End: 2023-09-07

## 2023-09-07 RX ORDER — POLYETHYLENE GLYCOL 3350 17 G/17G
17 POWDER, FOR SOLUTION ORAL DAILY PRN
Status: DISCONTINUED | OUTPATIENT
Start: 2023-09-07 | End: 2023-09-12 | Stop reason: HOSPADM

## 2023-09-07 RX ORDER — SODIUM CHLORIDE 9 MG/ML
INJECTION, SOLUTION INTRAVENOUS PRN
Status: DISCONTINUED | OUTPATIENT
Start: 2023-09-07 | End: 2023-09-12 | Stop reason: HOSPADM

## 2023-09-07 RX ORDER — FOLIC ACID 1 MG/1
1 TABLET ORAL DAILY
Status: DISCONTINUED | OUTPATIENT
Start: 2023-09-07 | End: 2023-09-12 | Stop reason: HOSPADM

## 2023-09-07 RX ORDER — VITAMIN B COMPLEX
1000 TABLET ORAL 2 TIMES DAILY
Status: DISCONTINUED | OUTPATIENT
Start: 2023-09-07 | End: 2023-09-12 | Stop reason: HOSPADM

## 2023-09-07 RX ORDER — SODIUM CHLORIDE 0.9 % (FLUSH) 0.9 %
5-40 SYRINGE (ML) INJECTION EVERY 12 HOURS SCHEDULED
Status: DISCONTINUED | OUTPATIENT
Start: 2023-09-07 | End: 2023-09-12 | Stop reason: HOSPADM

## 2023-09-07 RX ORDER — ACETAMINOPHEN 325 MG/1
650 TABLET ORAL EVERY 6 HOURS PRN
Status: DISCONTINUED | OUTPATIENT
Start: 2023-09-07 | End: 2023-09-12 | Stop reason: HOSPADM

## 2023-09-07 RX ORDER — ACETAMINOPHEN 325 MG/1
650 TABLET ORAL ONCE
Status: COMPLETED | OUTPATIENT
Start: 2023-09-07 | End: 2023-09-07

## 2023-09-07 RX ORDER — ISOSORBIDE MONONITRATE 60 MG/1
120 TABLET, EXTENDED RELEASE ORAL 2 TIMES DAILY
Status: DISCONTINUED | OUTPATIENT
Start: 2023-09-07 | End: 2023-09-12 | Stop reason: HOSPADM

## 2023-09-07 RX ORDER — LIDOCAINE 4 G/G
1 PATCH TOPICAL DAILY
Status: DISCONTINUED | OUTPATIENT
Start: 2023-09-07 | End: 2023-09-09

## 2023-09-07 RX ORDER — ONDANSETRON 2 MG/ML
4 INJECTION INTRAMUSCULAR; INTRAVENOUS EVERY 6 HOURS PRN
Status: DISCONTINUED | OUTPATIENT
Start: 2023-09-07 | End: 2023-09-12 | Stop reason: HOSPADM

## 2023-09-07 RX ORDER — FAMOTIDINE 20 MG/1
20 TABLET, FILM COATED ORAL DAILY
Status: DISCONTINUED | OUTPATIENT
Start: 2023-09-07 | End: 2023-09-09

## 2023-09-07 RX ORDER — ACETAMINOPHEN 650 MG/1
650 SUPPOSITORY RECTAL EVERY 6 HOURS PRN
Status: DISCONTINUED | OUTPATIENT
Start: 2023-09-07 | End: 2023-09-12 | Stop reason: HOSPADM

## 2023-09-07 RX ORDER — VERAPAMIL HYDROCHLORIDE 240 MG/1
240 TABLET, FILM COATED, EXTENDED RELEASE ORAL DAILY
Status: DISCONTINUED | OUTPATIENT
Start: 2023-09-07 | End: 2023-09-12 | Stop reason: HOSPADM

## 2023-09-07 RX ORDER — HEPARIN SODIUM 5000 [USP'U]/ML
5000 INJECTION, SOLUTION INTRAVENOUS; SUBCUTANEOUS EVERY 8 HOURS SCHEDULED
Status: DISCONTINUED | OUTPATIENT
Start: 2023-09-07 | End: 2023-09-12 | Stop reason: HOSPADM

## 2023-09-07 RX ORDER — LABETALOL 100 MG/1
100 TABLET, FILM COATED ORAL 2 TIMES DAILY
Status: DISCONTINUED | OUTPATIENT
Start: 2023-09-07 | End: 2023-09-12 | Stop reason: HOSPADM

## 2023-09-07 RX ORDER — ATORVASTATIN CALCIUM 40 MG/1
40 TABLET, FILM COATED ORAL NIGHTLY
Status: DISCONTINUED | OUTPATIENT
Start: 2023-09-07 | End: 2023-09-12 | Stop reason: HOSPADM

## 2023-09-07 RX ORDER — ONDANSETRON 4 MG/1
4 TABLET, ORALLY DISINTEGRATING ORAL ONCE
Status: COMPLETED | OUTPATIENT
Start: 2023-09-07 | End: 2023-09-07

## 2023-09-07 RX ORDER — ASPIRIN 81 MG/1
81 TABLET ORAL DAILY
Status: DISCONTINUED | OUTPATIENT
Start: 2023-09-08 | End: 2023-09-12 | Stop reason: HOSPADM

## 2023-09-07 RX ORDER — SERTRALINE HYDROCHLORIDE 100 MG/1
100 TABLET, FILM COATED ORAL DAILY
Status: DISCONTINUED | OUTPATIENT
Start: 2023-09-07 | End: 2023-09-12 | Stop reason: HOSPADM

## 2023-09-07 RX ORDER — ONDANSETRON 4 MG/1
4 TABLET, ORALLY DISINTEGRATING ORAL EVERY 8 HOURS PRN
Status: DISCONTINUED | OUTPATIENT
Start: 2023-09-07 | End: 2023-09-12 | Stop reason: HOSPADM

## 2023-09-07 RX ORDER — ENOXAPARIN SODIUM 100 MG/ML
30 INJECTION SUBCUTANEOUS DAILY
Status: DISCONTINUED | OUTPATIENT
Start: 2023-09-07 | End: 2023-09-07

## 2023-09-07 RX ORDER — TRAZODONE HYDROCHLORIDE 50 MG/1
50 TABLET ORAL NIGHTLY PRN
Status: DISCONTINUED | OUTPATIENT
Start: 2023-09-07 | End: 2023-09-12 | Stop reason: HOSPADM

## 2023-09-07 RX ORDER — ASPIRIN 81 MG/1
324 TABLET, CHEWABLE ORAL ONCE
Status: COMPLETED | OUTPATIENT
Start: 2023-09-07 | End: 2023-09-07

## 2023-09-07 RX ORDER — SODIUM CHLORIDE 0.9 % (FLUSH) 0.9 %
5-40 SYRINGE (ML) INJECTION PRN
Status: DISCONTINUED | OUTPATIENT
Start: 2023-09-07 | End: 2023-09-12 | Stop reason: HOSPADM

## 2023-09-07 RX ORDER — CINACALCET 30 MG/1
150 TABLET, FILM COATED ORAL
Status: DISCONTINUED | OUTPATIENT
Start: 2023-09-08 | End: 2023-09-12 | Stop reason: HOSPADM

## 2023-09-07 RX ORDER — FERROUS SULFATE 325(65) MG
325 TABLET ORAL
Status: DISCONTINUED | OUTPATIENT
Start: 2023-09-07 | End: 2023-09-12 | Stop reason: HOSPADM

## 2023-09-07 RX ORDER — VITAMIN E 268 MG
400 CAPSULE ORAL DAILY
Status: DISCONTINUED | OUTPATIENT
Start: 2023-09-07 | End: 2023-09-12 | Stop reason: HOSPADM

## 2023-09-07 RX ADMIN — ACETAMINOPHEN 650 MG: 325 TABLET ORAL at 13:23

## 2023-09-07 RX ADMIN — NITROGLYCERIN 5 MCG/MIN: 20 INJECTION INTRAVENOUS at 13:29

## 2023-09-07 RX ADMIN — FERROUS SULFATE TAB 325 MG (65 MG ELEMENTAL FE) 325 MG: 325 (65 FE) TAB at 21:28

## 2023-09-07 RX ADMIN — Medication 1000 UNITS: at 21:28

## 2023-09-07 RX ADMIN — ATORVASTATIN CALCIUM 40 MG: 40 TABLET, FILM COATED ORAL at 21:28

## 2023-09-07 RX ADMIN — Medication 400 UNITS: at 21:28

## 2023-09-07 RX ADMIN — ASPIRIN 81 MG 324 MG: 81 TABLET ORAL at 13:23

## 2023-09-07 RX ADMIN — ONDANSETRON 4 MG: 4 TABLET, ORALLY DISINTEGRATING ORAL at 13:23

## 2023-09-07 RX ADMIN — CLONIDINE HYDROCHLORIDE 0.3 MG: 0.2 TABLET ORAL at 21:28

## 2023-09-07 RX ADMIN — FOLIC ACID 1 MG: 1 TABLET ORAL at 21:28

## 2023-09-07 RX ADMIN — SERTRALINE 100 MG: 100 TABLET, FILM COATED ORAL at 21:28

## 2023-09-07 RX ADMIN — VERAPAMIL HYDROCHLORIDE 240 MG: 240 TABLET, FILM COATED, EXTENDED RELEASE ORAL at 21:28

## 2023-09-07 RX ADMIN — FAMOTIDINE 20 MG: 20 TABLET ORAL at 21:28

## 2023-09-07 RX ADMIN — HEPARIN SODIUM 5000 UNITS: 5000 INJECTION INTRAVENOUS; SUBCUTANEOUS at 21:28

## 2023-09-07 RX ADMIN — ISOSORBIDE MONONITRATE 120 MG: 60 TABLET, EXTENDED RELEASE ORAL at 21:27

## 2023-09-07 RX ADMIN — SODIUM CHLORIDE, PRESERVATIVE FREE 10 ML: 5 INJECTION INTRAVENOUS at 21:28

## 2023-09-07 RX ADMIN — Medication: at 17:56

## 2023-09-07 ASSESSMENT — PAIN DESCRIPTION - ORIENTATION: ORIENTATION: RIGHT;LEFT

## 2023-09-07 ASSESSMENT — PAIN DESCRIPTION - LOCATION
LOCATION: LEG
LOCATION: BACK;CHEST
LOCATION: ABDOMEN;CHEST

## 2023-09-07 ASSESSMENT — PAIN DESCRIPTION - DESCRIPTORS
DESCRIPTORS: SHARP
DESCRIPTORS: CRAMPING

## 2023-09-07 ASSESSMENT — PAIN - FUNCTIONAL ASSESSMENT
PAIN_FUNCTIONAL_ASSESSMENT: 0-10
PAIN_FUNCTIONAL_ASSESSMENT: 0-10

## 2023-09-07 ASSESSMENT — PAIN SCALES - GENERAL
PAINLEVEL_OUTOF10: 6
PAINLEVEL_OUTOF10: 8

## 2023-09-07 ASSESSMENT — PAIN DESCRIPTION - FREQUENCY: FREQUENCY: CONTINUOUS

## 2023-09-07 NOTE — H&P
9/7/2023 12:37 PM        XR CHEST PORTABLE    Result Date: 9/7/2023  PROCEDURE: XR CHEST PORTABLE CLINICAL INFORMATION: cp COMPARISON: 5/23/2023 TECHNIQUE: AP portable chest radiograph performed. FINDINGS: Thoracic aortic stent graft is seen Blunting of the costophrenic angles may relate to pleural effusion versus pleural thickening. Right lower lobe patchy opacity is noted. Cardiac silhouette is moderately enlarged. No pneumothorax. No acute bony abnormality. 1. Right lower lobe patchy opacity is noted that can relate to atelectasis and/or infiltrate. 2. Blunting of the costophrenic angles may relate to mild pleural effusion versus pleural thickening. **This report has been created using voice recognition software. It may contain minor errors which are inherent in voice recognition technology. ** Final report electronically signed by Dr Levy Leyden on 9/7/2023 12:37 PM        Tele:   [x] yes             [] no      Thank you WADE Aguila CNP for the opportunity to be involved in this patient's care.     Electronically signed by Lawanda Ramírez PA-C on 9/7/2023 at 5:04 PM

## 2023-09-07 NOTE — ED PROVIDER NOTES
ATTENDING NOTE:    I supervised and discussed the history, physical exam and the management of this patient with the resident. I reviewed the resident's note and agree with the documented findings and plan of care. Please see my additional note. Presents to the emergency department for chest pain, difficulty breathing, lower back pain. Patient states that he has had lower back pain for the past week which began after he tried to lift his knees. He states the pain is in the lumbar area and occasionally he has pains that radiate down his legs. No fever or incontinence. No fall or trauma. He reports that he has had chest pain intermittently for the last 2 to 3 days. Yesterday he admits to cocaine use, he states that he was trying to help himself feel better due to a history of depression and PTSD. He also ran out of all of his medications 2 days ago and has been unable to take them. He also states that he has been out of his home nasal cannula oxygen since September 3. He states he had to move out of his living situation at that time and the new place that he moved to would not allow him to bring his oxygen tanks or machine with him. He is supposed to be on chronic nasal cannula oxygen due to a history of a lobectomy in the past.  He denies fever, vomiting, or diarrhea. He does report mild global headache. He reports a past history of aortic dissection with graft performed in 2017, he states his symptoms at that time were not at all like his symptoms today, he states then he had a sensation like he was being torn apart inside which is not what he is feeling currently. He did get his full dialysis session today prior to coming to the emergency department. He states there he told him about his chest pain and his blood pressure was elevated after his session he was sent to the ER for further evaluation.   He is requesting admission to the hospital, stating he needs help with getting his oxygen back on
blank):  nonspecific ST and T waves changes  All EKG results are individually reviewed and interpreted by me in the clinical context of this patient. All EKGs are also interpreted by our Cardiology department, final interpretation may not be available as of the writing of this note. MEDICAL DECISION MAKING   Comorbid conditions pertinent to this ED encounter:  Not applicable      Differential Diagnosis includes but is not limited to: Aortic dissection  Cocaine induced MI  ACS  Stable angina  AAA dissection  COPD exacerbation  CHF exacerbation  Pneumonia       Decision Rules/Clinical Scores utilized:  Not Applicable. Plan:   Labs  Imaging  Serial EKG  Possible admit         Code Status:  Not addressed during this ED visit    Social determinants of health impacting treatment or disposition:        PREVIOUS RECORDS  AND EXTERNAL INFORMATION REVIEWED   History obtained from: the patient. Pertinent previous and/or external records reviewed: Noncontributory.     Case discussed with specialties other than Emergency Medicine: Hospitalist and Not Applicable      ED COURSE   ED Medications administered this visit (None if left blank):   Medications   lidocaine 4 % external patch 1 patch (1 patch TransDERmal Patch Applied 9/7/23 1323)   aspirin EC tablet 81 mg (has no administration in time range)   atorvastatin (LIPITOR) tablet 40 mg (40 mg Oral Given 9/7/23 2128)   cinacalcet (SENSIPAR) tablet 150 mg (has no administration in time range)   cloNIDine (CATAPRES) tablet 0.3 mg (0.3 mg Oral Given 9/7/23 2128)   famotidine (PEPCID) tablet 20 mg (20 mg Oral Given 1/6/12 4144)   folic acid (FOLVITE) tablet 1 mg (1 mg Oral Given 9/7/23 2128)   ferrous sulfate (IRON 325) tablet 325 mg (325 mg Oral Given 9/7/23 2128)   sertraline (ZOLOFT) tablet 100 mg (100 mg Oral Given 9/7/23 2128)   traZODone (DESYREL) tablet 50 mg (has no administration in time range)   verapamil (CALAN SR) extended release tablet 240 mg (240 mg Oral

## 2023-09-07 NOTE — ED NOTES
First encounter with patient and IV nitro was paused. RN restarted nitro.       Tess Lucio, RN  09/07/23 1959

## 2023-09-08 ENCOUNTER — APPOINTMENT (OUTPATIENT)
Dept: CT IMAGING | Age: 58
DRG: 091 | End: 2023-09-08
Payer: MEDICARE

## 2023-09-08 ENCOUNTER — APPOINTMENT (OUTPATIENT)
Dept: ULTRASOUND IMAGING | Age: 58
DRG: 091 | End: 2023-09-08
Payer: MEDICARE

## 2023-09-08 ENCOUNTER — APPOINTMENT (OUTPATIENT)
Dept: NON INVASIVE DIAGNOSTICS | Age: 58
DRG: 091 | End: 2023-09-08
Payer: MEDICARE

## 2023-09-08 LAB
ALBUMIN SERPL BCG-MCNC: 3.6 G/DL (ref 3.5–5.1)
ALP SERPL-CCNC: 166 U/L (ref 38–126)
ALT SERPL W/O P-5'-P-CCNC: 6 U/L (ref 11–66)
AMYLASE SERPL-CCNC: 179 U/L (ref 20–104)
ANION GAP SERPL CALC-SCNC: 15 MEQ/L (ref 8–16)
ANISOCYTOSIS BLD QL SMEAR: PRESENT
AST SERPL-CCNC: 12 U/L (ref 5–40)
AUTO DIFF PNL BLD: ABNORMAL
BASOPHILS ABSOLUTE: 0 THOU/MM3 (ref 0–0.1)
BASOPHILS NFR BLD AUTO: 0.8 %
BILIRUB CONJ SERPL-MCNC: < 0.2 MG/DL (ref 0–0.3)
BILIRUB SERPL-MCNC: 0.3 MG/DL (ref 0.3–1.2)
BUN SERPL-MCNC: 36 MG/DL (ref 7–22)
CALCIUM SERPL-MCNC: 7.9 MG/DL (ref 8.5–10.5)
CHLORIDE SERPL-SCNC: 102 MEQ/L (ref 98–111)
CO2 SERPL-SCNC: 27 MEQ/L (ref 23–33)
CREAT SERPL-MCNC: 7.4 MG/DL (ref 0.4–1.2)
DACROCYTES: ABNORMAL
DEPRECATED RDW RBC AUTO: 57.3 FL (ref 35–45)
EKG ATRIAL RATE: 92 BPM
EKG ATRIAL RATE: 99 BPM
EKG P AXIS: 64 DEGREES
EKG P AXIS: 64 DEGREES
EKG P-R INTERVAL: 196 MS
EKG P-R INTERVAL: 198 MS
EKG Q-T INTERVAL: 384 MS
EKG Q-T INTERVAL: 412 MS
EKG QRS DURATION: 102 MS
EKG QRS DURATION: 116 MS
EKG QTC CALCULATION (BAZETT): 492 MS
EKG QTC CALCULATION (BAZETT): 509 MS
EKG R AXIS: -38 DEGREES
EKG R AXIS: -44 DEGREES
EKG T AXIS: 90 DEGREES
EKG T AXIS: 94 DEGREES
EKG VENTRICULAR RATE: 92 BPM
EKG VENTRICULAR RATE: 99 BPM
EOSINOPHIL NFR BLD AUTO: 3.1 %
EOSINOPHILS ABSOLUTE: 0.2 THOU/MM3 (ref 0–0.4)
ERYTHROCYTE [DISTWIDTH] IN BLOOD BY AUTOMATED COUNT: 14.1 % (ref 11.5–14.5)
GFR SERPL CREATININE-BSD FRML MDRD: 8 ML/MIN/1.73M2
GLUCOSE SERPL-MCNC: 88 MG/DL (ref 70–108)
HCT VFR BLD AUTO: 39.1 % (ref 42–52)
HGB BLD-MCNC: 11.3 GM/DL (ref 14–18)
HYPOCHROMIA BLD QL SMEAR: PRESENT
IMM GRANULOCYTES # BLD AUTO: 0.01 THOU/MM3 (ref 0–0.07)
IMM GRANULOCYTES NFR BLD AUTO: 0.2 %
LIPASE SERPL-CCNC: 39.6 U/L (ref 5.6–51.3)
LYMPHOCYTES ABSOLUTE: 0.9 THOU/MM3 (ref 1–4.8)
LYMPHOCYTES NFR BLD AUTO: 17.6 %
MACROCYTES BLD QL SMEAR: PRESENT
MCH RBC QN AUTO: 31.8 PG (ref 26–33)
MCHC RBC AUTO-ENTMCNC: 28.9 GM/DL (ref 32.2–35.5)
MCV RBC AUTO: 110.1 FL (ref 80–94)
MONOCYTES ABSOLUTE: 0.6 THOU/MM3 (ref 0.4–1.3)
MONOCYTES NFR BLD AUTO: 12 %
NEUTROPHILS NFR BLD AUTO: 66.3 %
NRBC BLD AUTO-RTO: 0 /100 WBC
PATHOLOGIST REVIEW: ABNORMAL
PLATELET # BLD AUTO: 134 THOU/MM3 (ref 130–400)
PLATELET BLD QL SMEAR: ADEQUATE
PMV BLD AUTO: 9.9 FL (ref 9.4–12.4)
POTASSIUM SERPL-SCNC: 5.4 MEQ/L (ref 3.5–5.2)
PROT SERPL-MCNC: 6.8 G/DL (ref 6.1–8)
RBC # BLD AUTO: 3.55 MILL/MM3 (ref 4.7–6.1)
SCAN OF BLOOD SMEAR: NORMAL
SEGMENTED NEUTROPHILS ABSOLUTE COUNT: 3.4 THOU/MM3 (ref 1.8–7.7)
SODIUM SERPL-SCNC: 144 MEQ/L (ref 135–145)
WBC # BLD AUTO: 5.2 THOU/MM3 (ref 4.8–10.8)

## 2023-09-08 PROCEDURE — 1200000000 HC SEMI PRIVATE

## 2023-09-08 PROCEDURE — 82150 ASSAY OF AMYLASE: CPT

## 2023-09-08 PROCEDURE — 83690 ASSAY OF LIPASE: CPT

## 2023-09-08 PROCEDURE — 76705 ECHO EXAM OF ABDOMEN: CPT

## 2023-09-08 PROCEDURE — 93017 CV STRESS TEST TRACING ONLY: CPT | Performed by: INTERNAL MEDICINE

## 2023-09-08 PROCEDURE — 78452 HT MUSCLE IMAGE SPECT MULT: CPT | Performed by: INTERNAL MEDICINE

## 2023-09-08 PROCEDURE — A9500 TC99M SESTAMIBI: HCPCS

## 2023-09-08 PROCEDURE — 80048 BASIC METABOLIC PNL TOTAL CA: CPT

## 2023-09-08 PROCEDURE — 99233 SBSQ HOSP IP/OBS HIGH 50: CPT | Performed by: INTERNAL MEDICINE

## 2023-09-08 PROCEDURE — 6370000000 HC RX 637 (ALT 250 FOR IP)

## 2023-09-08 PROCEDURE — 3430000000 HC RX DIAGNOSTIC RADIOPHARMACEUTICAL

## 2023-09-08 PROCEDURE — 36415 COLL VENOUS BLD VENIPUNCTURE: CPT

## 2023-09-08 PROCEDURE — 5A1D70Z PERFORMANCE OF URINARY FILTRATION, INTERMITTENT, LESS THAN 6 HOURS PER DAY: ICD-10-PCS | Performed by: INTERNAL MEDICINE

## 2023-09-08 PROCEDURE — 1200000003 HC TELEMETRY R&B

## 2023-09-08 PROCEDURE — 74176 CT ABD & PELVIS W/O CONTRAST: CPT

## 2023-09-08 PROCEDURE — 80076 HEPATIC FUNCTION PANEL: CPT

## 2023-09-08 PROCEDURE — 85025 COMPLETE CBC W/AUTO DIFF WBC: CPT

## 2023-09-08 PROCEDURE — 6360000002 HC RX W HCPCS

## 2023-09-08 PROCEDURE — 71250 CT THORAX DX C-: CPT

## 2023-09-08 PROCEDURE — 2580000003 HC RX 258

## 2023-09-08 PROCEDURE — 99222 1ST HOSP IP/OBS MODERATE 55: CPT | Performed by: NURSE PRACTITIONER

## 2023-09-08 PROCEDURE — 99222 1ST HOSP IP/OBS MODERATE 55: CPT | Performed by: INTERNAL MEDICINE

## 2023-09-08 RX ORDER — TETRAKIS(2-METHOXYISOBUTYLISOCYANIDE)COPPER(I) TETRAFLUOROBORATE 1 MG/ML
33.3 INJECTION, POWDER, LYOPHILIZED, FOR SOLUTION INTRAVENOUS
Status: COMPLETED | OUTPATIENT
Start: 2023-09-08 | End: 2023-09-08

## 2023-09-08 RX ORDER — TETRAKIS(2-METHOXYISOBUTYLISOCYANIDE)COPPER(I) TETRAFLUOROBORATE 1 MG/ML
9.5 INJECTION, POWDER, LYOPHILIZED, FOR SOLUTION INTRAVENOUS
Status: COMPLETED | OUTPATIENT
Start: 2023-09-08 | End: 2023-09-08

## 2023-09-08 RX ADMIN — SERTRALINE 100 MG: 100 TABLET, FILM COATED ORAL at 10:31

## 2023-09-08 RX ADMIN — Medication 33.3 MILLICURIE: at 08:12

## 2023-09-08 RX ADMIN — HEPARIN SODIUM 5000 UNITS: 5000 INJECTION INTRAVENOUS; SUBCUTANEOUS at 05:51

## 2023-09-08 RX ADMIN — SODIUM CHLORIDE, PRESERVATIVE FREE 10 ML: 5 INJECTION INTRAVENOUS at 10:33

## 2023-09-08 RX ADMIN — CINACALCET HYDROCHLORIDE 150 MG: 30 TABLET, FILM COATED ORAL at 10:35

## 2023-09-08 RX ADMIN — Medication 400 UNITS: at 10:30

## 2023-09-08 RX ADMIN — FOLIC ACID 1 MG: 1 TABLET ORAL at 10:31

## 2023-09-08 RX ADMIN — HEPARIN SODIUM 5000 UNITS: 5000 INJECTION INTRAVENOUS; SUBCUTANEOUS at 21:48

## 2023-09-08 RX ADMIN — HEPARIN SODIUM 5000 UNITS: 5000 INJECTION INTRAVENOUS; SUBCUTANEOUS at 14:17

## 2023-09-08 RX ADMIN — ACETAMINOPHEN 650 MG: 325 TABLET ORAL at 22:25

## 2023-09-08 RX ADMIN — CLONIDINE HYDROCHLORIDE 0.3 MG: 0.2 TABLET ORAL at 10:31

## 2023-09-08 RX ADMIN — ACETAMINOPHEN 650 MG: 325 TABLET ORAL at 01:36

## 2023-09-08 RX ADMIN — FERROUS SULFATE TAB 325 MG (65 MG ELEMENTAL FE) 325 MG: 325 (65 FE) TAB at 12:10

## 2023-09-08 RX ADMIN — CLONIDINE HYDROCHLORIDE 0.3 MG: 0.2 TABLET ORAL at 14:17

## 2023-09-08 RX ADMIN — SODIUM ZIRCONIUM CYCLOSILICATE 10 G: 10 POWDER, FOR SUSPENSION ORAL at 12:10

## 2023-09-08 RX ADMIN — Medication 9.5 MILLICURIE: at 07:10

## 2023-09-08 RX ADMIN — FAMOTIDINE 20 MG: 20 TABLET ORAL at 10:31

## 2023-09-08 RX ADMIN — SODIUM CHLORIDE, PRESERVATIVE FREE 10 ML: 5 INJECTION INTRAVENOUS at 21:43

## 2023-09-08 RX ADMIN — ISOSORBIDE MONONITRATE 120 MG: 60 TABLET, EXTENDED RELEASE ORAL at 10:29

## 2023-09-08 RX ADMIN — Medication 1000 UNITS: at 21:43

## 2023-09-08 RX ADMIN — FERROUS SULFATE TAB 325 MG (65 MG ELEMENTAL FE) 325 MG: 325 (65 FE) TAB at 18:25

## 2023-09-08 RX ADMIN — VERAPAMIL HYDROCHLORIDE 240 MG: 240 TABLET, FILM COATED, EXTENDED RELEASE ORAL at 10:31

## 2023-09-08 RX ADMIN — ISOSORBIDE MONONITRATE 120 MG: 60 TABLET, EXTENDED RELEASE ORAL at 21:43

## 2023-09-08 RX ADMIN — Medication 1000 UNITS: at 12:10

## 2023-09-08 RX ADMIN — ASPIRIN 81 MG: 81 TABLET, COATED ORAL at 10:30

## 2023-09-08 RX ADMIN — FERROUS SULFATE TAB 325 MG (65 MG ELEMENTAL FE) 325 MG: 325 (65 FE) TAB at 10:32

## 2023-09-08 RX ADMIN — CLONIDINE HYDROCHLORIDE 0.3 MG: 0.2 TABLET ORAL at 21:43

## 2023-09-08 RX ADMIN — ATORVASTATIN CALCIUM 40 MG: 40 TABLET, FILM COATED ORAL at 21:43

## 2023-09-08 ASSESSMENT — PAIN - FUNCTIONAL ASSESSMENT
PAIN_FUNCTIONAL_ASSESSMENT: ACTIVITIES ARE NOT PREVENTED
PAIN_FUNCTIONAL_ASSESSMENT: PREVENTS OR INTERFERES SOME ACTIVE ACTIVITIES AND ADLS
PAIN_FUNCTIONAL_ASSESSMENT: ACTIVITIES ARE NOT PREVENTED

## 2023-09-08 ASSESSMENT — PAIN DESCRIPTION - LOCATION
LOCATION: CHEST;BACK
LOCATION: LEG
LOCATION: LEG

## 2023-09-08 ASSESSMENT — PAIN DESCRIPTION - ONSET
ONSET: SUDDEN

## 2023-09-08 ASSESSMENT — PAIN DESCRIPTION - ORIENTATION
ORIENTATION: RIGHT

## 2023-09-08 ASSESSMENT — PAIN SCALES - GENERAL
PAINLEVEL_OUTOF10: 6
PAINLEVEL_OUTOF10: 6
PAINLEVEL_OUTOF10: 0

## 2023-09-08 ASSESSMENT — PAIN DESCRIPTION - PAIN TYPE
TYPE: ACUTE PAIN

## 2023-09-08 ASSESSMENT — PAIN DESCRIPTION - DESCRIPTORS
DESCRIPTORS: CRAMPING
DESCRIPTORS: CRAMPING
DESCRIPTORS: SHARP

## 2023-09-08 ASSESSMENT — PAIN DESCRIPTION - FREQUENCY
FREQUENCY: OTHER (COMMENT)
FREQUENCY: CONTINUOUS
FREQUENCY: CONTINUOUS

## 2023-09-08 NOTE — FLOWSHEET NOTE
09/08/23 1330   Safe Environment   Safety Measures Other (comment)  (VN safety round)     VN called into patients room and introduced myself and role. Patient did not answer. Video activated for safety check. . Patient up in chair. . Patient has call light within reach. No obvious signs of distress noted at this time.

## 2023-09-08 NOTE — PLAN OF CARE
Problem: Discharge Planning  Goal: Discharge to home or other facility with appropriate resources  Outcome: Progressing  Flowsheets (Taken 9/7/2023 2006)  Discharge to home or other facility with appropriate resources: Identify barriers to discharge with patient and caregiver     Problem: Pain  Goal: Verbalizes/displays adequate comfort level or baseline comfort level  Outcome: Progressing  Flowsheets (Taken 9/7/2023 1938)  Verbalizes/displays adequate comfort level or baseline comfort level: Encourage patient to monitor pain and request assistance

## 2023-09-08 NOTE — DISCHARGE INSTRUCTIONS
Follow-up with Dr. Arely Barrett in the office in 2 - 3 weeks. [address] at [address] - enter front entrance of hospital and go to left at . Take K elevators to 2nd floor. Cardiology check-in desk will be to your right as you exit the elevator.

## 2023-09-09 LAB
ANION GAP SERPL CALC-SCNC: 15 MEQ/L (ref 8–16)
BUN SERPL-MCNC: 54 MG/DL (ref 7–22)
CALCIUM SERPL-MCNC: 7.3 MG/DL (ref 8.5–10.5)
CHLORIDE SERPL-SCNC: 98 MEQ/L (ref 98–111)
CO2 SERPL-SCNC: 25 MEQ/L (ref 23–33)
CREAT SERPL-MCNC: 9.8 MG/DL (ref 0.4–1.2)
D DIMER PPP IA.FEU-MCNC: 6406 NG/ML FEU (ref 0–500)
DEPRECATED RDW RBC AUTO: 54.1 FL (ref 35–45)
ERYTHROCYTE [DISTWIDTH] IN BLOOD BY AUTOMATED COUNT: 14 % (ref 11.5–14.5)
GFR SERPL CREATININE-BSD FRML MDRD: 6 ML/MIN/1.73M2
GLUCOSE SERPL-MCNC: 88 MG/DL (ref 70–108)
HCT VFR BLD AUTO: 34.2 % (ref 42–52)
HGB BLD-MCNC: 10.4 GM/DL (ref 14–18)
MCH RBC QN AUTO: 32 PG (ref 26–33)
MCHC RBC AUTO-ENTMCNC: 30.4 GM/DL (ref 32.2–35.5)
MCV RBC AUTO: 105.2 FL (ref 80–94)
PLATELET # BLD AUTO: 112 THOU/MM3 (ref 130–400)
PMV BLD AUTO: 10.4 FL (ref 9.4–12.4)
POTASSIUM SERPL-SCNC: 4.7 MEQ/L (ref 3.5–5.2)
RBC # BLD AUTO: 3.25 MILL/MM3 (ref 4.7–6.1)
SODIUM SERPL-SCNC: 138 MEQ/L (ref 135–145)
WBC # BLD AUTO: 4.5 THOU/MM3 (ref 4.8–10.8)

## 2023-09-09 PROCEDURE — 6360000002 HC RX W HCPCS: Performed by: INTERNAL MEDICINE

## 2023-09-09 PROCEDURE — 1200000003 HC TELEMETRY R&B

## 2023-09-09 PROCEDURE — 85027 COMPLETE CBC AUTOMATED: CPT

## 2023-09-09 PROCEDURE — 85379 FIBRIN DEGRADATION QUANT: CPT

## 2023-09-09 PROCEDURE — 80048 BASIC METABOLIC PNL TOTAL CA: CPT

## 2023-09-09 PROCEDURE — 1200000000 HC SEMI PRIVATE

## 2023-09-09 PROCEDURE — 6360000002 HC RX W HCPCS

## 2023-09-09 PROCEDURE — 90935 HEMODIALYSIS ONE EVALUATION: CPT | Performed by: INTERNAL MEDICINE

## 2023-09-09 PROCEDURE — 99233 SBSQ HOSP IP/OBS HIGH 50: CPT | Performed by: INTERNAL MEDICINE

## 2023-09-09 PROCEDURE — 2580000003 HC RX 258: Performed by: INTERNAL MEDICINE

## 2023-09-09 PROCEDURE — 6370000000 HC RX 637 (ALT 250 FOR IP): Performed by: INTERNAL MEDICINE

## 2023-09-09 PROCEDURE — 6370000000 HC RX 637 (ALT 250 FOR IP)

## 2023-09-09 PROCEDURE — 2580000003 HC RX 258

## 2023-09-09 PROCEDURE — 90935 HEMODIALYSIS ONE EVALUATION: CPT

## 2023-09-09 PROCEDURE — 36415 COLL VENOUS BLD VENIPUNCTURE: CPT

## 2023-09-09 RX ORDER — HYDROCODONE BITARTRATE AND ACETAMINOPHEN 5; 325 MG/1; MG/1
1 TABLET ORAL EVERY 6 HOURS PRN
Status: DISPENSED | OUTPATIENT
Start: 2023-09-09 | End: 2023-09-11

## 2023-09-09 RX ORDER — HYDRALAZINE HYDROCHLORIDE 25 MG/1
25 TABLET, FILM COATED ORAL EVERY 8 HOURS SCHEDULED
Status: DISCONTINUED | OUTPATIENT
Start: 2023-09-09 | End: 2023-09-12

## 2023-09-09 RX ORDER — PANTOPRAZOLE SODIUM 40 MG/1
40 TABLET, DELAYED RELEASE ORAL
Status: DISCONTINUED | OUTPATIENT
Start: 2023-09-10 | End: 2023-09-12 | Stop reason: HOSPADM

## 2023-09-09 RX ORDER — LIDOCAINE 4 G/G
2 PATCH TOPICAL EVERY 24 HOURS
Status: DISCONTINUED | OUTPATIENT
Start: 2023-09-10 | End: 2023-09-11

## 2023-09-09 RX ORDER — CEFUROXIME AXETIL 250 MG/1
500 TABLET ORAL EVERY 24 HOURS
Status: DISCONTINUED | OUTPATIENT
Start: 2023-09-09 | End: 2023-09-09

## 2023-09-09 RX ADMIN — CLONIDINE HYDROCHLORIDE 0.3 MG: 0.2 TABLET ORAL at 21:02

## 2023-09-09 RX ADMIN — FERROUS SULFATE TAB 325 MG (65 MG ELEMENTAL FE) 325 MG: 325 (65 FE) TAB at 13:11

## 2023-09-09 RX ADMIN — ISOSORBIDE MONONITRATE 120 MG: 60 TABLET, EXTENDED RELEASE ORAL at 13:11

## 2023-09-09 RX ADMIN — HYDROCODONE BITARTRATE AND ACETAMINOPHEN 1 TABLET: 5; 325 TABLET ORAL at 19:30

## 2023-09-09 RX ADMIN — FERROUS SULFATE TAB 325 MG (65 MG ELEMENTAL FE) 325 MG: 325 (65 FE) TAB at 15:03

## 2023-09-09 RX ADMIN — SODIUM CHLORIDE, PRESERVATIVE FREE 10 ML: 5 INJECTION INTRAVENOUS at 21:03

## 2023-09-09 RX ADMIN — VERAPAMIL HYDROCHLORIDE 240 MG: 240 TABLET, FILM COATED, EXTENDED RELEASE ORAL at 13:11

## 2023-09-09 RX ADMIN — ISOSORBIDE MONONITRATE 120 MG: 60 TABLET, EXTENDED RELEASE ORAL at 21:02

## 2023-09-09 RX ADMIN — TRAZODONE HYDROCHLORIDE 50 MG: 50 TABLET ORAL at 21:01

## 2023-09-09 RX ADMIN — HEPARIN SODIUM 5000 UNITS: 5000 INJECTION INTRAVENOUS; SUBCUTANEOUS at 21:01

## 2023-09-09 RX ADMIN — Medication 400 UNITS: at 13:10

## 2023-09-09 RX ADMIN — CLONIDINE HYDROCHLORIDE 0.3 MG: 0.2 TABLET ORAL at 13:11

## 2023-09-09 RX ADMIN — SODIUM CHLORIDE, PRESERVATIVE FREE 10 ML: 5 INJECTION INTRAVENOUS at 13:13

## 2023-09-09 RX ADMIN — Medication 1000 UNITS: at 13:10

## 2023-09-09 RX ADMIN — HYDROCODONE BITARTRATE AND ACETAMINOPHEN 1 TABLET: 5; 325 TABLET ORAL at 13:11

## 2023-09-09 RX ADMIN — SERTRALINE 100 MG: 100 TABLET, FILM COATED ORAL at 13:11

## 2023-09-09 RX ADMIN — HYDRALAZINE HYDROCHLORIDE 25 MG: 25 TABLET, FILM COATED ORAL at 15:04

## 2023-09-09 RX ADMIN — CEFTRIAXONE SODIUM 1000 MG: 1 INJECTION, POWDER, FOR SOLUTION INTRAMUSCULAR; INTRAVENOUS at 15:05

## 2023-09-09 RX ADMIN — FOLIC ACID 1 MG: 1 TABLET ORAL at 13:11

## 2023-09-09 RX ADMIN — ASPIRIN 81 MG: 81 TABLET, COATED ORAL at 13:11

## 2023-09-09 RX ADMIN — HYDRALAZINE HYDROCHLORIDE 25 MG: 25 TABLET, FILM COATED ORAL at 21:02

## 2023-09-09 RX ADMIN — HEPARIN SODIUM 5000 UNITS: 5000 INJECTION INTRAVENOUS; SUBCUTANEOUS at 15:04

## 2023-09-09 RX ADMIN — ATORVASTATIN CALCIUM 40 MG: 40 TABLET, FILM COATED ORAL at 21:02

## 2023-09-09 RX ADMIN — Medication 1000 UNITS: at 21:02

## 2023-09-09 RX ADMIN — HEPARIN SODIUM 5000 UNITS: 5000 INJECTION INTRAVENOUS; SUBCUTANEOUS at 05:26

## 2023-09-09 ASSESSMENT — PAIN SCALES - GENERAL
PAINLEVEL_OUTOF10: 7
PAINLEVEL_OUTOF10: 7

## 2023-09-09 ASSESSMENT — PAIN DESCRIPTION - ORIENTATION
ORIENTATION: RIGHT
ORIENTATION: RIGHT;DISTAL

## 2023-09-09 ASSESSMENT — PAIN DESCRIPTION - LOCATION
LOCATION: ABDOMEN
LOCATION: ABDOMEN

## 2023-09-09 ASSESSMENT — PAIN DESCRIPTION - DESCRIPTORS: DESCRIPTORS: ACHING

## 2023-09-09 NOTE — PLAN OF CARE
Problem: Discharge Planning  Goal: Discharge to home or other facility with appropriate resources  Outcome: Progressing  Flowsheets (Taken 9/8/2023 2055)  Discharge to home or other facility with appropriate resources: Identify barriers to discharge with patient and caregiver     Problem: Pain  Goal: Verbalizes/displays adequate comfort level or baseline comfort level  Outcome: Progressing  Flowsheets (Taken 9/8/2023 1915)  Verbalizes/displays adequate comfort level or baseline comfort level: Encourage patient to monitor pain and request assistance     Problem: Chronic Conditions and Co-morbidities  Goal: Patient's chronic conditions and co-morbidity symptoms are monitored and maintained or improved  Outcome: Progressing  Flowsheets (Taken 9/8/2023 2055)  Care Plan - Patient's Chronic Conditions and Co-Morbidity Symptoms are Monitored and Maintained or Improved: Monitor and assess patient's chronic conditions and comorbid symptoms for stability, deterioration, or improvement     Problem: Safety - Adult  Goal: Free from fall injury  Outcome: Progressing

## 2023-09-10 LAB
ANION GAP SERPL CALC-SCNC: 12 MEQ/L (ref 8–16)
BUN SERPL-MCNC: 43 MG/DL (ref 7–22)
CALCIUM SERPL-MCNC: 7.8 MG/DL (ref 8.5–10.5)
CHLORIDE SERPL-SCNC: 104 MEQ/L (ref 98–111)
CO2 SERPL-SCNC: 24 MEQ/L (ref 23–33)
CREAT SERPL-MCNC: 7.1 MG/DL (ref 0.4–1.2)
GFR SERPL CREATININE-BSD FRML MDRD: 8 ML/MIN/1.73M2
GLUCOSE SERPL-MCNC: 92 MG/DL (ref 70–108)
POTASSIUM SERPL-SCNC: 5.1 MEQ/L (ref 3.5–5.2)
SODIUM SERPL-SCNC: 140 MEQ/L (ref 135–145)

## 2023-09-10 PROCEDURE — 2580000003 HC RX 258

## 2023-09-10 PROCEDURE — 6370000000 HC RX 637 (ALT 250 FOR IP): Performed by: INTERNAL MEDICINE

## 2023-09-10 PROCEDURE — 1200000000 HC SEMI PRIVATE

## 2023-09-10 PROCEDURE — 6360000002 HC RX W HCPCS

## 2023-09-10 PROCEDURE — 99232 SBSQ HOSP IP/OBS MODERATE 35: CPT | Performed by: INTERNAL MEDICINE

## 2023-09-10 PROCEDURE — 1200000003 HC TELEMETRY R&B

## 2023-09-10 PROCEDURE — 99222 1ST HOSP IP/OBS MODERATE 55: CPT | Performed by: PHYSICIAN ASSISTANT

## 2023-09-10 PROCEDURE — 6360000002 HC RX W HCPCS: Performed by: INTERNAL MEDICINE

## 2023-09-10 PROCEDURE — 6370000000 HC RX 637 (ALT 250 FOR IP)

## 2023-09-10 PROCEDURE — 2580000003 HC RX 258: Performed by: INTERNAL MEDICINE

## 2023-09-10 PROCEDURE — 80048 BASIC METABOLIC PNL TOTAL CA: CPT

## 2023-09-10 PROCEDURE — 36415 COLL VENOUS BLD VENIPUNCTURE: CPT

## 2023-09-10 RX ORDER — LIDOCAINE 4 G/G
1 PATCH TOPICAL DAILY
Status: DISCONTINUED | OUTPATIENT
Start: 2023-09-10 | End: 2023-09-11

## 2023-09-10 RX ADMIN — CLONIDINE HYDROCHLORIDE 0.3 MG: 0.2 TABLET ORAL at 13:25

## 2023-09-10 RX ADMIN — HEPARIN SODIUM 5000 UNITS: 5000 INJECTION INTRAVENOUS; SUBCUTANEOUS at 21:01

## 2023-09-10 RX ADMIN — HYDROCODONE BITARTRATE AND ACETAMINOPHEN 1 TABLET: 5; 325 TABLET ORAL at 08:04

## 2023-09-10 RX ADMIN — ISOSORBIDE MONONITRATE 120 MG: 60 TABLET, EXTENDED RELEASE ORAL at 08:03

## 2023-09-10 RX ADMIN — CLONIDINE HYDROCHLORIDE 0.3 MG: 0.2 TABLET ORAL at 08:03

## 2023-09-10 RX ADMIN — CEFTRIAXONE SODIUM 1000 MG: 1 INJECTION, POWDER, FOR SOLUTION INTRAMUSCULAR; INTRAVENOUS at 13:33

## 2023-09-10 RX ADMIN — ASPIRIN 81 MG: 81 TABLET, COATED ORAL at 08:07

## 2023-09-10 RX ADMIN — VERAPAMIL HYDROCHLORIDE 240 MG: 240 TABLET, FILM COATED, EXTENDED RELEASE ORAL at 08:03

## 2023-09-10 RX ADMIN — Medication 1000 UNITS: at 08:03

## 2023-09-10 RX ADMIN — ATORVASTATIN CALCIUM 40 MG: 40 TABLET, FILM COATED ORAL at 21:00

## 2023-09-10 RX ADMIN — CLONIDINE HYDROCHLORIDE 0.3 MG: 0.2 TABLET ORAL at 21:00

## 2023-09-10 RX ADMIN — FERROUS SULFATE TAB 325 MG (65 MG ELEMENTAL FE) 325 MG: 325 (65 FE) TAB at 16:29

## 2023-09-10 RX ADMIN — HYDROCODONE BITARTRATE AND ACETAMINOPHEN 1 TABLET: 5; 325 TABLET ORAL at 18:41

## 2023-09-10 RX ADMIN — Medication 400 UNITS: at 08:03

## 2023-09-10 RX ADMIN — HEPARIN SODIUM 5000 UNITS: 5000 INJECTION INTRAVENOUS; SUBCUTANEOUS at 13:24

## 2023-09-10 RX ADMIN — SODIUM CHLORIDE, PRESERVATIVE FREE 10 ML: 5 INJECTION INTRAVENOUS at 08:04

## 2023-09-10 RX ADMIN — HYDRALAZINE HYDROCHLORIDE 25 MG: 25 TABLET, FILM COATED ORAL at 21:00

## 2023-09-10 RX ADMIN — FERROUS SULFATE TAB 325 MG (65 MG ELEMENTAL FE) 325 MG: 325 (65 FE) TAB at 13:25

## 2023-09-10 RX ADMIN — FERROUS SULFATE TAB 325 MG (65 MG ELEMENTAL FE) 325 MG: 325 (65 FE) TAB at 08:03

## 2023-09-10 RX ADMIN — HYDROCODONE BITARTRATE AND ACETAMINOPHEN 1 TABLET: 5; 325 TABLET ORAL at 01:19

## 2023-09-10 RX ADMIN — ACETAMINOPHEN 650 MG: 325 TABLET ORAL at 21:01

## 2023-09-10 RX ADMIN — ISOSORBIDE MONONITRATE 120 MG: 60 TABLET, EXTENDED RELEASE ORAL at 21:00

## 2023-09-10 RX ADMIN — FOLIC ACID 1 MG: 1 TABLET ORAL at 08:03

## 2023-09-10 RX ADMIN — PANTOPRAZOLE SODIUM 40 MG: 40 TABLET, DELAYED RELEASE ORAL at 08:04

## 2023-09-10 RX ADMIN — SERTRALINE 100 MG: 100 TABLET, FILM COATED ORAL at 08:04

## 2023-09-10 RX ADMIN — HYDRALAZINE HYDROCHLORIDE 25 MG: 25 TABLET, FILM COATED ORAL at 05:16

## 2023-09-10 RX ADMIN — SODIUM CHLORIDE, PRESERVATIVE FREE 10 ML: 5 INJECTION INTRAVENOUS at 21:04

## 2023-09-10 RX ADMIN — HEPARIN SODIUM 5000 UNITS: 5000 INJECTION INTRAVENOUS; SUBCUTANEOUS at 05:17

## 2023-09-10 RX ADMIN — Medication 1000 UNITS: at 21:00

## 2023-09-10 ASSESSMENT — PAIN SCALES - GENERAL
PAINLEVEL_OUTOF10: 7
PAINLEVEL_OUTOF10: 1
PAINLEVEL_OUTOF10: 0
PAINLEVEL_OUTOF10: 7
PAINLEVEL_OUTOF10: 4

## 2023-09-10 ASSESSMENT — PAIN DESCRIPTION - DESCRIPTORS
DESCRIPTORS: ACHING;DISCOMFORT
DESCRIPTORS: ACHING;DISCOMFORT
DESCRIPTORS: ACHING
DESCRIPTORS: ACHING;DISCOMFORT

## 2023-09-10 ASSESSMENT — PAIN DESCRIPTION - LOCATION
LOCATION: CHEST
LOCATION: ABDOMEN
LOCATION: RIB CAGE
LOCATION: CHEST

## 2023-09-10 ASSESSMENT — PAIN SCALES - WONG BAKER
WONGBAKER_NUMERICALRESPONSE: 0
WONGBAKER_NUMERICALRESPONSE: 0

## 2023-09-10 ASSESSMENT — PAIN DESCRIPTION - ORIENTATION
ORIENTATION: RIGHT
ORIENTATION: RIGHT
ORIENTATION: RIGHT;MID
ORIENTATION: LOWER;MID

## 2023-09-10 ASSESSMENT — PAIN - FUNCTIONAL ASSESSMENT: PAIN_FUNCTIONAL_ASSESSMENT: ACTIVITIES ARE NOT PREVENTED

## 2023-09-10 NOTE — PLAN OF CARE
Problem: Discharge Planning  Goal: Discharge to home or other facility with appropriate resources  Outcome: Progressing  Flowsheets (Taken 9/9/2023 2045)  Discharge to home or other facility with appropriate resources:   Identify barriers to discharge with patient and caregiver   Arrange for needed discharge resources and transportation as appropriate   Identify discharge learning needs (meds, wound care, etc)   Refer to discharge planning if patient needs post-hospital services based on physician order or complex needs related to functional status, cognitive ability or social support system     Problem: Pain  Goal: Verbalizes/displays adequate comfort level or baseline comfort level  Outcome: Progressing     Problem: Chronic Conditions and Co-morbidities  Goal: Patient's chronic conditions and co-morbidity symptoms are monitored and maintained or improved  Outcome: Progressing  Flowsheets (Taken 9/9/2023 2045)  Care Plan - Patient's Chronic Conditions and Co-Morbidity Symptoms are Monitored and Maintained or Improved:   Monitor and assess patient's chronic conditions and comorbid symptoms for stability, deterioration, or improvement   Collaborate with multidisciplinary team to address chronic and comorbid conditions and prevent exacerbation or deterioration   Update acute care plan with appropriate goals if chronic or comorbid symptoms are exacerbated and prevent overall improvement and discharge     Problem: Safety - Adult  Goal: Free from fall injury  Outcome: Progressing     Problem: Cardiovascular - Adult  Goal: Maintains optimal cardiac output and hemodynamic stability  Outcome: Progressing  Goal: Absence of cardiac dysrhythmias or at baseline  Outcome: Progressing     Problem: Metabolic/Fluid and Electrolytes - Adult  Goal: Electrolytes maintained within normal limits  Outcome: Progressing  Flowsheets (Taken 9/9/2023 2045)  Electrolytes maintained within normal limits:   Monitor labs and assess patient for

## 2023-09-10 NOTE — PLAN OF CARE
Problem: Pain  Goal: Verbalizes/displays adequate comfort level or baseline comfort level  Outcome: Progressing  Note: Norco for pain. Lidoderm patches ordered for RUQ and lumbar. Problem: Chronic Conditions and Co-morbidities  Goal: Patient's chronic conditions and co-morbidity symptoms are monitored and maintained or improved  Outcome: Progressing  Note: Chronic conditions at baseline. Problem: Safety - Adult  Goal: Free from fall injury  Outcome: Progressing  Note: No falls this shift. Call light within reach. Bed and chair alarm in use. Up with independently in room and halls. Problem: Cardiovascular - Adult  Goal: Maintains optimal cardiac output and hemodynamic stability  Outcome: Progressing  Note: Vitals and tele WDL. Problem: Cardiovascular - Adult  Goal: Absence of cardiac dysrhythmias or at baseline  Outcome: Progressing  Note: Stable. Problem: Metabolic/Fluid and Electrolytes - Adult  Goal: Electrolytes maintained within normal limits  Outcome: Progressing  Note: HD patient, nephro on case. Problem: Discharge Planning  Goal: Discharge to home or other facility with appropriate resources  Note: Planning to consult pain management and CT surgery tomorrow due to continued chronic RUQ pain. I have reviewed the patient's medical history in detail and updated the computerized patient record.

## 2023-09-11 PROBLEM — G89.12 POST-THORACOTOMY PAIN SYNDROME: Status: ACTIVE | Noted: 2023-09-11

## 2023-09-11 PROBLEM — G89.4 CHRONIC PAIN SYNDROME: Status: ACTIVE | Noted: 2023-09-11

## 2023-09-11 PROBLEM — R07.81 PLEURAL PAIN: Status: ACTIVE | Noted: 2023-09-07

## 2023-09-11 LAB
ANION GAP SERPL CALC-SCNC: 13 MEQ/L (ref 8–16)
BUN SERPL-MCNC: 62 MG/DL (ref 7–22)
CALCIUM SERPL-MCNC: 8 MG/DL (ref 8.5–10.5)
CHLORIDE SERPL-SCNC: 98 MEQ/L (ref 98–111)
CO2 SERPL-SCNC: 24 MEQ/L (ref 23–33)
CREAT SERPL-MCNC: 8.9 MG/DL (ref 0.4–1.2)
GFR SERPL CREATININE-BSD FRML MDRD: 6 ML/MIN/1.73M2
GLUCOSE SERPL-MCNC: 121 MG/DL (ref 70–108)
POTASSIUM SERPL-SCNC: 5.2 MEQ/L (ref 3.5–5.2)
PROCALCITONIN SERPL IA-MCNC: 0.47 NG/ML (ref 0.01–0.09)
SODIUM SERPL-SCNC: 135 MEQ/L (ref 135–145)

## 2023-09-11 PROCEDURE — 99232 SBSQ HOSP IP/OBS MODERATE 35: CPT | Performed by: INTERNAL MEDICINE

## 2023-09-11 PROCEDURE — 36415 COLL VENOUS BLD VENIPUNCTURE: CPT

## 2023-09-11 PROCEDURE — 99233 SBSQ HOSP IP/OBS HIGH 50: CPT | Performed by: INTERNAL MEDICINE

## 2023-09-11 PROCEDURE — 6360000002 HC RX W HCPCS: Performed by: INTERNAL MEDICINE

## 2023-09-11 PROCEDURE — 6370000000 HC RX 637 (ALT 250 FOR IP): Performed by: INTERNAL MEDICINE

## 2023-09-11 PROCEDURE — 6370000000 HC RX 637 (ALT 250 FOR IP)

## 2023-09-11 PROCEDURE — 6360000002 HC RX W HCPCS

## 2023-09-11 PROCEDURE — 80048 BASIC METABOLIC PNL TOTAL CA: CPT

## 2023-09-11 PROCEDURE — 2580000003 HC RX 258

## 2023-09-11 PROCEDURE — 2580000003 HC RX 258: Performed by: INTERNAL MEDICINE

## 2023-09-11 PROCEDURE — 84145 PROCALCITONIN (PCT): CPT

## 2023-09-11 PROCEDURE — 99222 1ST HOSP IP/OBS MODERATE 55: CPT | Performed by: NURSE PRACTITIONER

## 2023-09-11 PROCEDURE — 1200000000 HC SEMI PRIVATE

## 2023-09-11 RX ORDER — LIDOCAINE 4 G/G
3 PATCH TOPICAL DAILY
Status: DISCONTINUED | OUTPATIENT
Start: 2023-09-11 | End: 2023-09-12 | Stop reason: HOSPADM

## 2023-09-11 RX ORDER — HYDROCODONE BITARTRATE AND ACETAMINOPHEN 5; 325 MG/1; MG/1
2 TABLET ORAL EVERY 4 HOURS PRN
Status: DISCONTINUED | OUTPATIENT
Start: 2023-09-11 | End: 2023-09-12 | Stop reason: HOSPADM

## 2023-09-11 RX ORDER — HYDROCODONE BITARTRATE AND ACETAMINOPHEN 5; 325 MG/1; MG/1
1 TABLET ORAL EVERY 4 HOURS PRN
Status: DISCONTINUED | OUTPATIENT
Start: 2023-09-11 | End: 2023-09-12 | Stop reason: HOSPADM

## 2023-09-11 RX ADMIN — SERTRALINE 100 MG: 100 TABLET, FILM COATED ORAL at 09:19

## 2023-09-11 RX ADMIN — CINACALCET HYDROCHLORIDE 150 MG: 30 TABLET, FILM COATED ORAL at 09:20

## 2023-09-11 RX ADMIN — HEPARIN SODIUM 5000 UNITS: 5000 INJECTION INTRAVENOUS; SUBCUTANEOUS at 06:43

## 2023-09-11 RX ADMIN — PANTOPRAZOLE SODIUM 40 MG: 40 TABLET, DELAYED RELEASE ORAL at 06:42

## 2023-09-11 RX ADMIN — HYDROCODONE BITARTRATE AND ACETAMINOPHEN 1 TABLET: 5; 325 TABLET ORAL at 00:25

## 2023-09-11 RX ADMIN — CEFTRIAXONE SODIUM 1000 MG: 1 INJECTION, POWDER, FOR SOLUTION INTRAMUSCULAR; INTRAVENOUS at 14:00

## 2023-09-11 RX ADMIN — SODIUM CHLORIDE, PRESERVATIVE FREE 10 ML: 5 INJECTION INTRAVENOUS at 19:47

## 2023-09-11 RX ADMIN — ISOSORBIDE MONONITRATE 120 MG: 60 TABLET, EXTENDED RELEASE ORAL at 09:20

## 2023-09-11 RX ADMIN — ATORVASTATIN CALCIUM 40 MG: 40 TABLET, FILM COATED ORAL at 19:47

## 2023-09-11 RX ADMIN — SODIUM CHLORIDE, PRESERVATIVE FREE 10 ML: 5 INJECTION INTRAVENOUS at 09:25

## 2023-09-11 RX ADMIN — HYDROCODONE BITARTRATE AND ACETAMINOPHEN 2 TABLET: 5; 325 TABLET ORAL at 23:46

## 2023-09-11 RX ADMIN — Medication 1000 UNITS: at 09:19

## 2023-09-11 RX ADMIN — HYDRALAZINE HYDROCHLORIDE 25 MG: 25 TABLET, FILM COATED ORAL at 21:48

## 2023-09-11 RX ADMIN — FERROUS SULFATE TAB 325 MG (65 MG ELEMENTAL FE) 325 MG: 325 (65 FE) TAB at 17:27

## 2023-09-11 RX ADMIN — FERROUS SULFATE TAB 325 MG (65 MG ELEMENTAL FE) 325 MG: 325 (65 FE) TAB at 09:19

## 2023-09-11 RX ADMIN — Medication 400 UNITS: at 09:19

## 2023-09-11 RX ADMIN — Medication 1000 UNITS: at 19:47

## 2023-09-11 RX ADMIN — CLONIDINE HYDROCHLORIDE 0.3 MG: 0.2 TABLET ORAL at 19:47

## 2023-09-11 RX ADMIN — HYDRALAZINE HYDROCHLORIDE 25 MG: 25 TABLET, FILM COATED ORAL at 13:47

## 2023-09-11 RX ADMIN — HYDROCODONE BITARTRATE AND ACETAMINOPHEN 2 TABLET: 5; 325 TABLET ORAL at 19:46

## 2023-09-11 RX ADMIN — ASPIRIN 81 MG: 81 TABLET, COATED ORAL at 09:16

## 2023-09-11 RX ADMIN — HYDROCODONE BITARTRATE AND ACETAMINOPHEN 2 TABLET: 5; 325 TABLET ORAL at 14:50

## 2023-09-11 RX ADMIN — FERROUS SULFATE TAB 325 MG (65 MG ELEMENTAL FE) 325 MG: 325 (65 FE) TAB at 13:47

## 2023-09-11 RX ADMIN — ISOSORBIDE MONONITRATE 120 MG: 60 TABLET, EXTENDED RELEASE ORAL at 19:47

## 2023-09-11 RX ADMIN — CLONIDINE HYDROCHLORIDE 0.3 MG: 0.2 TABLET ORAL at 13:47

## 2023-09-11 RX ADMIN — HYDRALAZINE HYDROCHLORIDE 25 MG: 25 TABLET, FILM COATED ORAL at 06:43

## 2023-09-11 RX ADMIN — HEPARIN SODIUM 5000 UNITS: 5000 INJECTION INTRAVENOUS; SUBCUTANEOUS at 13:47

## 2023-09-11 RX ADMIN — CLONIDINE HYDROCHLORIDE 0.3 MG: 0.2 TABLET ORAL at 09:20

## 2023-09-11 RX ADMIN — HEPARIN SODIUM 5000 UNITS: 5000 INJECTION INTRAVENOUS; SUBCUTANEOUS at 21:48

## 2023-09-11 RX ADMIN — FOLIC ACID 1 MG: 1 TABLET ORAL at 09:19

## 2023-09-11 RX ADMIN — VERAPAMIL HYDROCHLORIDE 240 MG: 240 TABLET, FILM COATED, EXTENDED RELEASE ORAL at 09:20

## 2023-09-11 RX ADMIN — HYDROCODONE BITARTRATE AND ACETAMINOPHEN 1 TABLET: 5; 325 TABLET ORAL at 06:42

## 2023-09-11 ASSESSMENT — PAIN DESCRIPTION - PAIN TYPE
TYPE: ACUTE PAIN;CHRONIC PAIN
TYPE: ACUTE PAIN

## 2023-09-11 ASSESSMENT — PAIN DESCRIPTION - ORIENTATION
ORIENTATION: RIGHT

## 2023-09-11 ASSESSMENT — PAIN DESCRIPTION - LOCATION
LOCATION: RIB CAGE
LOCATION: ABDOMEN;RIB CAGE
LOCATION: RIB CAGE
LOCATION: RIB CAGE
LOCATION: ABDOMEN;RIB CAGE

## 2023-09-11 ASSESSMENT — PAIN SCALES - GENERAL
PAINLEVEL_OUTOF10: 8
PAINLEVEL_OUTOF10: 7
PAINLEVEL_OUTOF10: 8
PAINLEVEL_OUTOF10: 7
PAINLEVEL_OUTOF10: 6
PAINLEVEL_OUTOF10: 8
PAINLEVEL_OUTOF10: 3
PAINLEVEL_OUTOF10: 6
PAINLEVEL_OUTOF10: 6

## 2023-09-11 ASSESSMENT — PAIN DESCRIPTION - FREQUENCY: FREQUENCY: CONTINUOUS

## 2023-09-11 ASSESSMENT — PAIN SCALES - WONG BAKER: WONGBAKER_NUMERICALRESPONSE: 0

## 2023-09-11 ASSESSMENT — PAIN DESCRIPTION - DIRECTION: RADIATING_TOWARDS: FLANK

## 2023-09-11 ASSESSMENT — PAIN DESCRIPTION - DESCRIPTORS
DESCRIPTORS: SORE;ACHING;DISCOMFORT
DESCRIPTORS: ACHING;DISCOMFORT;SHARP
DESCRIPTORS: ACHING;DISCOMFORT
DESCRIPTORS: ACHING;DISCOMFORT;SHARP
DESCRIPTORS: ACHING;DISCOMFORT

## 2023-09-11 ASSESSMENT — PAIN DESCRIPTION - ONSET: ONSET: GRADUAL

## 2023-09-11 NOTE — PLAN OF CARE
Problem: Discharge Planning  Goal: Discharge to home or other facility with appropriate resources  Outcome: Progressing     Problem: Pain  Goal: Verbalizes/displays adequate comfort level or baseline comfort level  Outcome: Progressing     Problem: Chronic Conditions and Co-morbidities  Goal: Patient's chronic conditions and co-morbidity symptoms are monitored and maintained or improved  Outcome: Progressing     Problem: Safety - Adult  Goal: Free from fall injury  Outcome: Progressing     Problem: Cardiovascular - Adult  Goal: Maintains optimal cardiac output and hemodynamic stability  Outcome: Progressing  Goal: Absence of cardiac dysrhythmias or at baseline  Outcome: Progressing     Problem: Metabolic/Fluid and Electrolytes - Adult  Goal: Electrolytes maintained within normal limits  Outcome: Progressing  Goal: Hemodynamic stability and optimal renal function maintained  Outcome: Progressing  Goal: Glucose maintained within prescribed range  Outcome: Progressing     Problem: Hematologic - Adult  Goal: Maintains hematologic stability  Outcome: Progressing

## 2023-09-12 VITALS
TEMPERATURE: 97.4 F | WEIGHT: 214.29 LBS | SYSTOLIC BLOOD PRESSURE: 159 MMHG | DIASTOLIC BLOOD PRESSURE: 71 MMHG | BODY MASS INDEX: 26.64 KG/M2 | HEIGHT: 75 IN | OXYGEN SATURATION: 97 % | HEART RATE: 78 BPM | RESPIRATION RATE: 17 BRPM

## 2023-09-12 LAB
ANION GAP SERPL CALC-SCNC: 13 MEQ/L (ref 8–16)
BUN SERPL-MCNC: 80 MG/DL (ref 7–22)
CALCIUM SERPL-MCNC: 7.6 MG/DL (ref 8.5–10.5)
CHLORIDE SERPL-SCNC: 100 MEQ/L (ref 98–111)
CO2 SERPL-SCNC: 24 MEQ/L (ref 23–33)
CREAT SERPL-MCNC: 11 MG/DL (ref 0.4–1.2)
GFR SERPL CREATININE-BSD FRML MDRD: 5 ML/MIN/1.73M2
GLUCOSE SERPL-MCNC: 91 MG/DL (ref 70–108)
POTASSIUM SERPL-SCNC: 5.9 MEQ/L (ref 3.5–5.2)
SODIUM SERPL-SCNC: 137 MEQ/L (ref 135–145)

## 2023-09-12 PROCEDURE — 6360000002 HC RX W HCPCS: Performed by: INTERNAL MEDICINE

## 2023-09-12 PROCEDURE — 6360000002 HC RX W HCPCS

## 2023-09-12 PROCEDURE — 90935 HEMODIALYSIS ONE EVALUATION: CPT | Performed by: INTERNAL MEDICINE

## 2023-09-12 PROCEDURE — 6370000000 HC RX 637 (ALT 250 FOR IP): Performed by: INTERNAL MEDICINE

## 2023-09-12 PROCEDURE — 80048 BASIC METABOLIC PNL TOTAL CA: CPT

## 2023-09-12 PROCEDURE — 99232 SBSQ HOSP IP/OBS MODERATE 35: CPT | Performed by: NURSE PRACTITIONER

## 2023-09-12 PROCEDURE — 6370000000 HC RX 637 (ALT 250 FOR IP)

## 2023-09-12 PROCEDURE — 2580000003 HC RX 258: Performed by: INTERNAL MEDICINE

## 2023-09-12 PROCEDURE — 36415 COLL VENOUS BLD VENIPUNCTURE: CPT

## 2023-09-12 PROCEDURE — 90935 HEMODIALYSIS ONE EVALUATION: CPT

## 2023-09-12 RX ORDER — HYDRALAZINE HYDROCHLORIDE 50 MG/1
50 TABLET, FILM COATED ORAL EVERY 8 HOURS SCHEDULED
Qty: 90 TABLET | Refills: 3 | Status: SHIPPED | OUTPATIENT
Start: 2023-09-12

## 2023-09-12 RX ORDER — HYDRALAZINE HYDROCHLORIDE 50 MG/1
50 TABLET, FILM COATED ORAL EVERY 8 HOURS SCHEDULED
Status: DISCONTINUED | OUTPATIENT
Start: 2023-09-12 | End: 2023-09-12 | Stop reason: HOSPADM

## 2023-09-12 RX ORDER — CEFDINIR 300 MG/1
300 CAPSULE ORAL DAILY
Qty: 3 CAPSULE | Refills: 0 | Status: SHIPPED | OUTPATIENT
Start: 2023-09-12 | End: 2023-09-15

## 2023-09-12 RX ORDER — HYDROCODONE BITARTRATE AND ACETAMINOPHEN 5; 325 MG/1; MG/1
1 TABLET ORAL EVERY 4 HOURS PRN
Qty: 15 TABLET | Refills: 0 | Status: SHIPPED | OUTPATIENT
Start: 2023-09-12 | End: 2023-09-12 | Stop reason: SDUPTHER

## 2023-09-12 RX ORDER — HYDROCODONE BITARTRATE AND ACETAMINOPHEN 5; 325 MG/1; MG/1
1 TABLET ORAL EVERY 4 HOURS PRN
Qty: 15 TABLET | Refills: 0 | Status: SHIPPED | OUTPATIENT
Start: 2023-09-12 | End: 2023-09-16

## 2023-09-12 RX ADMIN — CLONIDINE HYDROCHLORIDE 0.3 MG: 0.2 TABLET ORAL at 09:44

## 2023-09-12 RX ADMIN — HYDRALAZINE HYDROCHLORIDE 25 MG: 25 TABLET, FILM COATED ORAL at 04:10

## 2023-09-12 RX ADMIN — CEFTRIAXONE SODIUM 1000 MG: 1 INJECTION, POWDER, FOR SOLUTION INTRAMUSCULAR; INTRAVENOUS at 14:12

## 2023-09-12 RX ADMIN — HEPARIN SODIUM 5000 UNITS: 5000 INJECTION INTRAVENOUS; SUBCUTANEOUS at 06:10

## 2023-09-12 RX ADMIN — HYDROCODONE BITARTRATE AND ACETAMINOPHEN 2 TABLET: 5; 325 TABLET ORAL at 14:02

## 2023-09-12 RX ADMIN — ISOSORBIDE MONONITRATE 120 MG: 60 TABLET, EXTENDED RELEASE ORAL at 09:46

## 2023-09-12 RX ADMIN — VERAPAMIL HYDROCHLORIDE 240 MG: 240 TABLET, FILM COATED, EXTENDED RELEASE ORAL at 09:49

## 2023-09-12 RX ADMIN — HYDRALAZINE HYDROCHLORIDE 50 MG: 25 TABLET, FILM COATED ORAL at 14:04

## 2023-09-12 RX ADMIN — FERROUS SULFATE TAB 325 MG (65 MG ELEMENTAL FE) 325 MG: 325 (65 FE) TAB at 14:04

## 2023-09-12 RX ADMIN — PANTOPRAZOLE SODIUM 40 MG: 40 TABLET, DELAYED RELEASE ORAL at 04:10

## 2023-09-12 RX ADMIN — HYDROCODONE BITARTRATE AND ACETAMINOPHEN 2 TABLET: 5; 325 TABLET ORAL at 04:09

## 2023-09-12 RX ADMIN — CLONIDINE HYDROCHLORIDE 0.3 MG: 0.2 TABLET ORAL at 14:03

## 2023-09-12 ASSESSMENT — PAIN DESCRIPTION - ORIENTATION
ORIENTATION: RIGHT
ORIENTATION: RIGHT

## 2023-09-12 ASSESSMENT — PAIN SCALES - GENERAL
PAINLEVEL_OUTOF10: 6
PAINLEVEL_OUTOF10: 7

## 2023-09-12 ASSESSMENT — PAIN - FUNCTIONAL ASSESSMENT: PAIN_FUNCTIONAL_ASSESSMENT: ACTIVITIES ARE NOT PREVENTED

## 2023-09-12 ASSESSMENT — PAIN DESCRIPTION - LOCATION
LOCATION: RIB CAGE
LOCATION: CHEST

## 2023-09-12 ASSESSMENT — PAIN SCALES - WONG BAKER: WONGBAKER_NUMERICALRESPONSE: 0

## 2023-09-12 ASSESSMENT — PAIN DESCRIPTION - DESCRIPTORS
DESCRIPTORS: ACHING;DISCOMFORT
DESCRIPTORS: ACHING;DISCOMFORT

## 2023-09-12 NOTE — FLOWSHEET NOTE
09/12/23 4723   Safe Environment   Safety Measures Other (comment)  (VN safety rounds completed)     Pt responds to audio , permits video , self and role identified , pt resting in bed in position of comfort , call light visible within reach , no voiced concerns stated only slight pain relief with meds , stated nurse is aware complaints .

## 2023-09-12 NOTE — CARE COORDINATION
9/11/23, 2:34 PM EDT    DISCHARGE ON GOING [address] day: 4  Location: -11/011-A Reason for admit: Chest pain in adult [R07.9]  Chest pain, unspecified type [R07.9]  Hypertension, unspecified type [I10]   Procedure: 9/8 stress test neg. Barriers to Discharge: IV rocephin. Continues to complain of pleuritic pain. CVS evaluated, no notes available at this time. Await pain management consult. PCP: WADE Matias CNP  Readmission Risk Score: 24.1%  Patient Goals/Plan/Treatment Preferences: Home with wife. TTS HD at [facility].
9/12/23, 2:46 PM EDT    Discharge to home with wife. Denies needs. Patient goals/plan/ treatment preferences discussed by  and . Patient goals/plan/ treatment preferences reviewed with patient/ family. Patient/ family verbalize understanding of discharge plan and are in agreement with goal/plan/treatment preferences. Understanding was demonstrated using the teach back method. AVS provided by RN at time of discharge, which includes all necessary medical information pertaining to the patients current course of illness, treatment, post-discharge goals of care, and treatment preferences. Pt verbalized understanding and gave permission for possible discharge within 4 hours of receiving IMM.      Services At/After Discharge: None       IMM Letter  IMM Letter given to Patient/Family/Significant other/Guardian/POA/by[de-identified] Rody Snell CM  IMM Letter date given[de-identified] 09/12/23  IMM Letter time given[de-identified] 5213
9/12/23, 8:43 AM EDT    DISCHARGE ON GOING [address] day: 5  Location: -11/011-A Reason for admit: Chest pain in adult [R07.9]  Chest pain, unspecified type [R07.9]  Hypertension, unspecified type [I10]   Procedure:  9/8 stress test neg. Barriers to Discharge: K+5.9, creat 11.0, Ca+7.6. Hypertensive, last 195/97. Pain management consult completed, meds adjusted. PCP: WADE Lynn CNP  Readmission Risk Score: 24.4%  Patient Goals/Plan/Treatment Preferences: Home with wife. TTS HD at [facility]. Denies needs.
Case Management Assessment  Initial Evaluation    Date/Time of Evaluation: 9/8/2023 11:47 AM  Assessment Completed by: Jerry Posadas RN    If patient is discharged prior to next notation, then this note serves as note for discharge by case management. Patient Name: Nima Gallagher                   YOB: 1965  Diagnosis: Chest pain in adult [R07.9]  Chest pain, unspecified type [R07.9]  Hypertension, unspecified type [I10]                   Date / Time: 9/7/2023 11:44 AM  Location: [facility]     Patient Admission Status: Inpatient   Readmission Risk Low 0-14, Mod 15-19), High > 20: Readmission Risk Score: 22.2    Current PCP: WADE Youngblood CNP  PCP verified by CM? Yes    Chart Reviewed: Yes      History Provided by: Patient  Patient Orientation: Alert and Oriented    Patient Cognition: Alert    Hospitalization in the last 30 days (Readmission):  No    If yes, Readmission Assessment in CM Navigator will be completed. Advance Directives:      Code Status: Full Code   Patient's Primary Decision Maker is: Patient Declined (Legal Next of Kin Remains as Decision Maker)      Discharge Planning:    Patient lives with: Spouse/Significant Other Type of Home: House  Primary Care Giver: Self  Patient Support Systems include: Spouse/Significant Other   Current Financial resources: Medicare  Current community resources: Other (Comment) (TTS HD [facility])  Current services prior to admission: Oxygen Therapy            Current DME:              Type of Home Care services:  None    ADLS  Prior functional level: Independent in ADLs/IADLs  Current functional level: Independent in ADLs/IADLs    Family can provide assistance at DC: Yes  Would you like Case Management to discuss the discharge plan with any other family members/significant others, and if so, who?  No  Plans to Return to Present Housing: Yes  Other Identified Issues/Barriers to RETURNING to current housing: none  Potential Assistance needed at
1.81

## 2023-09-12 NOTE — PLAN OF CARE
Problem: Discharge Planning  Goal: Discharge to home or other facility with appropriate resources  Outcome: Completed     Problem: Pain  Goal: Verbalizes/displays adequate comfort level or baseline comfort level  Outcome: Completed     Problem: Chronic Conditions and Co-morbidities  Goal: Patient's chronic conditions and co-morbidity symptoms are monitored and maintained or improved  Outcome: Completed     Problem: Safety - Adult  Goal: Free from fall injury  Outcome: Completed     Problem: Cardiovascular - Adult  Goal: Maintains optimal cardiac output and hemodynamic stability  Outcome: Completed  Goal: Absence of cardiac dysrhythmias or at baseline  Outcome: Completed     Problem: Metabolic/Fluid and Electrolytes - Adult  Goal: Electrolytes maintained within normal limits  Outcome: Completed  Goal: Hemodynamic stability and optimal renal function maintained  Outcome: Completed  Goal: Glucose maintained within prescribed range  Outcome: Completed     Problem: Hematologic - Adult  Goal: Maintains hematologic stability  Outcome: Completed

## 2023-09-28 ENCOUNTER — TELEPHONE (OUTPATIENT)
Dept: PULMONOLOGY | Age: 58
End: 2023-09-28

## 2023-09-28 NOTE — TELEPHONE ENCOUNTER
FYI - Received fax from Katalina at [facility]. Pt returned his O2 AMA d/t homelessness.

## 2023-12-03 ENCOUNTER — APPOINTMENT (OUTPATIENT)
Dept: GENERAL RADIOLOGY | Age: 58
DRG: 313 | End: 2023-12-03
Payer: MEDICARE

## 2023-12-03 ENCOUNTER — HOSPITAL ENCOUNTER (INPATIENT)
Age: 58
LOS: 1 days | Discharge: HOME | DRG: 313 | End: 2023-12-04
Attending: INTERNAL MEDICINE | Admitting: INTERNAL MEDICINE
Payer: MEDICARE

## 2023-12-03 DIAGNOSIS — E11.22 CONTROLLED TYPE 2 DIABETES MELLITUS WITH STAGE 5 CHRONIC KIDNEY DISEASE NOT ON CHRONIC DIALYSIS, WITHOUT LONG-TERM CURRENT USE OF INSULIN (HCC): ICD-10-CM

## 2023-12-03 DIAGNOSIS — I71.40 ABDOMINAL AORTIC ANEURYSM (AAA) WITHOUT RUPTURE, UNSPECIFIED PART (HCC): ICD-10-CM

## 2023-12-03 DIAGNOSIS — N18.5 CONTROLLED TYPE 2 DIABETES MELLITUS WITH STAGE 5 CHRONIC KIDNEY DISEASE NOT ON CHRONIC DIALYSIS, WITHOUT LONG-TERM CURRENT USE OF INSULIN (HCC): ICD-10-CM

## 2023-12-03 DIAGNOSIS — F14.10 COCAINE ABUSE (HCC): ICD-10-CM

## 2023-12-03 DIAGNOSIS — N18.6 ESRD NEEDING DIALYSIS (HCC): ICD-10-CM

## 2023-12-03 DIAGNOSIS — Z99.2 ESRD NEEDING DIALYSIS (HCC): ICD-10-CM

## 2023-12-03 DIAGNOSIS — R07.9 CHEST PAIN, UNSPECIFIED TYPE: Primary | ICD-10-CM

## 2023-12-03 LAB
ANION GAP SERPL CALC-SCNC: 14 MEQ/L (ref 8–16)
BASOPHILS ABSOLUTE: 0 THOU/MM3 (ref 0–0.1)
BASOPHILS NFR BLD AUTO: 0.5 %
BUN SERPL-MCNC: 27 MG/DL (ref 7–22)
CALCIUM SERPL-MCNC: 7.7 MG/DL (ref 8.5–10.5)
CHLORIDE SERPL-SCNC: 98 MEQ/L (ref 98–111)
CO2 SERPL-SCNC: 26 MEQ/L (ref 23–33)
CREAT SERPL-MCNC: 6 MG/DL (ref 0.4–1.2)
DEPRECATED RDW RBC AUTO: 49.5 FL (ref 35–45)
EOSINOPHIL NFR BLD AUTO: 0.8 %
EOSINOPHILS ABSOLUTE: 0 THOU/MM3 (ref 0–0.4)
ERYTHROCYTE [DISTWIDTH] IN BLOOD BY AUTOMATED COUNT: 12.7 % (ref 11.5–14.5)
GFR SERPL CREATININE-BSD FRML MDRD: 10 ML/MIN/1.73M2
GLUCOSE SERPL-MCNC: 109 MG/DL (ref 70–108)
HCT VFR BLD AUTO: 33.9 % (ref 42–52)
HGB BLD-MCNC: 10.5 GM/DL (ref 14–18)
IMM GRANULOCYTES # BLD AUTO: 0.01 THOU/MM3 (ref 0–0.07)
IMM GRANULOCYTES NFR BLD AUTO: 0.2 %
LYMPHOCYTES ABSOLUTE: 0.6 THOU/MM3 (ref 1–4.8)
LYMPHOCYTES NFR BLD AUTO: 10.1 %
MAGNESIUM SERPL-MCNC: 2 MG/DL (ref 1.6–2.4)
MCH RBC QN AUTO: 32.6 PG (ref 26–33)
MCHC RBC AUTO-ENTMCNC: 31 GM/DL (ref 32.2–35.5)
MCV RBC AUTO: 105.3 FL (ref 80–94)
MONOCYTES ABSOLUTE: 0.5 THOU/MM3 (ref 0.4–1.3)
MONOCYTES NFR BLD AUTO: 7.8 %
MRSA DNA SPEC QL NAA+PROBE: NEGATIVE
NEUTROPHILS NFR BLD AUTO: 80.6 %
NRBC BLD AUTO-RTO: 0 /100 WBC
PLATELET # BLD AUTO: 128 THOU/MM3 (ref 130–400)
PMV BLD AUTO: 10.6 FL (ref 9.4–12.4)
POTASSIUM SERPL-SCNC: 4.3 MEQ/L (ref 3.5–5.2)
RBC # BLD AUTO: 3.22 MILL/MM3 (ref 4.7–6.1)
SEGMENTED NEUTROPHILS ABSOLUTE COUNT: 4.8 THOU/MM3 (ref 1.8–7.7)
SODIUM SERPL-SCNC: 138 MEQ/L (ref 135–145)
TROPONIN, HIGH SENSITIVITY: 83 NG/L (ref 0–12)
TROPONIN, HIGH SENSITIVITY: 98 NG/L (ref 0–12)
WBC # BLD AUTO: 6 THOU/MM3 (ref 4.8–10.8)

## 2023-12-03 PROCEDURE — 36415 COLL VENOUS BLD VENIPUNCTURE: CPT

## 2023-12-03 PROCEDURE — 99223 1ST HOSP IP/OBS HIGH 75: CPT | Performed by: INTERNAL MEDICINE

## 2023-12-03 PROCEDURE — 80048 BASIC METABOLIC PNL TOTAL CA: CPT

## 2023-12-03 PROCEDURE — 71045 X-RAY EXAM CHEST 1 VIEW: CPT

## 2023-12-03 PROCEDURE — 99285 EMERGENCY DEPT VISIT HI MDM: CPT

## 2023-12-03 PROCEDURE — 6370000000 HC RX 637 (ALT 250 FOR IP): Performed by: STUDENT IN AN ORGANIZED HEALTH CARE EDUCATION/TRAINING PROGRAM

## 2023-12-03 PROCEDURE — 87070 CULTURE OTHR SPECIMN AEROBIC: CPT

## 2023-12-03 PROCEDURE — 2580000003 HC RX 258: Performed by: STUDENT IN AN ORGANIZED HEALTH CARE EDUCATION/TRAINING PROGRAM

## 2023-12-03 PROCEDURE — 6360000002 HC RX W HCPCS: Performed by: STUDENT IN AN ORGANIZED HEALTH CARE EDUCATION/TRAINING PROGRAM

## 2023-12-03 PROCEDURE — 6370000000 HC RX 637 (ALT 250 FOR IP)

## 2023-12-03 PROCEDURE — 84484 ASSAY OF TROPONIN QUANT: CPT

## 2023-12-03 PROCEDURE — 93010 ELECTROCARDIOGRAM REPORT: CPT | Performed by: INTERNAL MEDICINE

## 2023-12-03 PROCEDURE — 6360000002 HC RX W HCPCS

## 2023-12-03 PROCEDURE — 99222 1ST HOSP IP/OBS MODERATE 55: CPT | Performed by: INTERNAL MEDICINE

## 2023-12-03 PROCEDURE — 96374 THER/PROPH/DIAG INJ IV PUSH: CPT

## 2023-12-03 PROCEDURE — 2060000000 HC ICU INTERMEDIATE R&B

## 2023-12-03 PROCEDURE — 85025 COMPLETE CBC W/AUTO DIFF WBC: CPT

## 2023-12-03 PROCEDURE — 87641 MR-STAPH DNA AMP PROBE: CPT

## 2023-12-03 PROCEDURE — 93005 ELECTROCARDIOGRAM TRACING: CPT

## 2023-12-03 PROCEDURE — 83735 ASSAY OF MAGNESIUM: CPT

## 2023-12-03 RX ORDER — CALCITRIOL 0.25 UG/1
0.5 CAPSULE, LIQUID FILLED ORAL
Status: DISCONTINUED | OUTPATIENT
Start: 2023-12-04 | End: 2023-12-03 | Stop reason: SDUPTHER

## 2023-12-03 RX ORDER — ACETAMINOPHEN 325 MG/1
650 TABLET ORAL EVERY 6 HOURS PRN
Status: DISCONTINUED | OUTPATIENT
Start: 2023-12-03 | End: 2023-12-04 | Stop reason: HOSPADM

## 2023-12-03 RX ORDER — ENOXAPARIN SODIUM 100 MG/ML
30 INJECTION SUBCUTANEOUS DAILY
Status: DISCONTINUED | OUTPATIENT
Start: 2023-12-03 | End: 2023-12-03 | Stop reason: ALTCHOICE

## 2023-12-03 RX ORDER — VERAPAMIL HYDROCHLORIDE 240 MG/1
240 TABLET, FILM COATED, EXTENDED RELEASE ORAL DAILY
Status: DISCONTINUED | OUTPATIENT
Start: 2023-12-03 | End: 2023-12-04 | Stop reason: HOSPADM

## 2023-12-03 RX ORDER — POTASSIUM CHLORIDE 20 MEQ/1
40 TABLET, EXTENDED RELEASE ORAL PRN
Status: DISCONTINUED | OUTPATIENT
Start: 2023-12-03 | End: 2023-12-03

## 2023-12-03 RX ORDER — ISOSORBIDE MONONITRATE 60 MG/1
120 TABLET, EXTENDED RELEASE ORAL 2 TIMES DAILY
Status: DISCONTINUED | OUTPATIENT
Start: 2023-12-03 | End: 2023-12-04 | Stop reason: HOSPADM

## 2023-12-03 RX ORDER — MAGNESIUM SULFATE IN WATER 40 MG/ML
2000 INJECTION, SOLUTION INTRAVENOUS PRN
Status: DISCONTINUED | OUTPATIENT
Start: 2023-12-03 | End: 2023-12-03

## 2023-12-03 RX ORDER — ACETAMINOPHEN 650 MG/1
650 SUPPOSITORY RECTAL EVERY 6 HOURS PRN
Status: DISCONTINUED | OUTPATIENT
Start: 2023-12-03 | End: 2023-12-04 | Stop reason: HOSPADM

## 2023-12-03 RX ORDER — MORPHINE SULFATE 2 MG/ML
2 INJECTION, SOLUTION INTRAMUSCULAR; INTRAVENOUS ONCE
Status: COMPLETED | OUTPATIENT
Start: 2023-12-03 | End: 2023-12-03

## 2023-12-03 RX ORDER — POTASSIUM CHLORIDE 7.45 MG/ML
10 INJECTION INTRAVENOUS PRN
Status: DISCONTINUED | OUTPATIENT
Start: 2023-12-03 | End: 2023-12-03

## 2023-12-03 RX ORDER — FOLIC ACID 1 MG/1
1 TABLET ORAL DAILY
Status: DISCONTINUED | OUTPATIENT
Start: 2023-12-03 | End: 2023-12-04 | Stop reason: HOSPADM

## 2023-12-03 RX ORDER — ATORVASTATIN CALCIUM 40 MG/1
40 TABLET, FILM COATED ORAL NIGHTLY
Status: DISCONTINUED | OUTPATIENT
Start: 2023-12-03 | End: 2023-12-04 | Stop reason: HOSPADM

## 2023-12-03 RX ORDER — HEPARIN SODIUM 5000 [USP'U]/ML
5000 INJECTION, SOLUTION INTRAVENOUS; SUBCUTANEOUS EVERY 8 HOURS SCHEDULED
Status: DISCONTINUED | OUTPATIENT
Start: 2023-12-03 | End: 2023-12-04 | Stop reason: HOSPADM

## 2023-12-03 RX ORDER — ASPIRIN 81 MG/1
81 TABLET ORAL DAILY
Status: DISCONTINUED | OUTPATIENT
Start: 2023-12-03 | End: 2023-12-04 | Stop reason: HOSPADM

## 2023-12-03 RX ORDER — SODIUM CHLORIDE 9 MG/ML
INJECTION, SOLUTION INTRAVENOUS PRN
Status: DISCONTINUED | OUTPATIENT
Start: 2023-12-03 | End: 2023-12-04 | Stop reason: HOSPADM

## 2023-12-03 RX ORDER — FAMOTIDINE 20 MG/1
20 TABLET, FILM COATED ORAL DAILY
Status: DISCONTINUED | OUTPATIENT
Start: 2023-12-03 | End: 2023-12-04 | Stop reason: HOSPADM

## 2023-12-03 RX ORDER — POLYETHYLENE GLYCOL 3350 17 G/17G
17 POWDER, FOR SOLUTION ORAL DAILY PRN
Status: DISCONTINUED | OUTPATIENT
Start: 2023-12-03 | End: 2023-12-04 | Stop reason: HOSPADM

## 2023-12-03 RX ORDER — SODIUM CHLORIDE 0.9 % (FLUSH) 0.9 %
10 SYRINGE (ML) INJECTION PRN
Status: DISCONTINUED | OUTPATIENT
Start: 2023-12-03 | End: 2023-12-04 | Stop reason: HOSPADM

## 2023-12-03 RX ORDER — CALCITRIOL 0.25 UG/1
0.5 CAPSULE, LIQUID FILLED ORAL
Status: DISCONTINUED | OUTPATIENT
Start: 2023-12-04 | End: 2023-12-04 | Stop reason: HOSPADM

## 2023-12-03 RX ORDER — SODIUM CHLORIDE 9 MG/ML
INJECTION, SOLUTION INTRAVENOUS CONTINUOUS
Status: DISCONTINUED | OUTPATIENT
Start: 2023-12-03 | End: 2023-12-03

## 2023-12-03 RX ORDER — CINACALCET 30 MG/1
150 TABLET, FILM COATED ORAL
Status: DISCONTINUED | OUTPATIENT
Start: 2023-12-04 | End: 2023-12-04 | Stop reason: HOSPADM

## 2023-12-03 RX ORDER — SODIUM CHLORIDE 0.9 % (FLUSH) 0.9 %
5-40 SYRINGE (ML) INJECTION EVERY 12 HOURS SCHEDULED
Status: DISCONTINUED | OUTPATIENT
Start: 2023-12-03 | End: 2023-12-04 | Stop reason: HOSPADM

## 2023-12-03 RX ORDER — HYDRALAZINE HYDROCHLORIDE 50 MG/1
50 TABLET, FILM COATED ORAL EVERY 8 HOURS SCHEDULED
Status: DISCONTINUED | OUTPATIENT
Start: 2023-12-03 | End: 2023-12-04 | Stop reason: HOSPADM

## 2023-12-03 RX ORDER — NITROGLYCERIN 0.4 MG/1
0.4 TABLET SUBLINGUAL EVERY 5 MIN PRN
Status: COMPLETED | OUTPATIENT
Start: 2023-12-03 | End: 2023-12-03

## 2023-12-03 RX ORDER — ONDANSETRON 2 MG/ML
4 INJECTION INTRAMUSCULAR; INTRAVENOUS EVERY 6 HOURS PRN
Status: DISCONTINUED | OUTPATIENT
Start: 2023-12-03 | End: 2023-12-04 | Stop reason: HOSPADM

## 2023-12-03 RX ORDER — ONDANSETRON 4 MG/1
4 TABLET, ORALLY DISINTEGRATING ORAL EVERY 8 HOURS PRN
Status: DISCONTINUED | OUTPATIENT
Start: 2023-12-03 | End: 2023-12-04 | Stop reason: HOSPADM

## 2023-12-03 RX ADMIN — SODIUM CHLORIDE: 9 INJECTION, SOLUTION INTRAVENOUS at 09:54

## 2023-12-03 RX ADMIN — HEPARIN SODIUM 5000 UNITS: 5000 INJECTION INTRAVENOUS; SUBCUTANEOUS at 22:32

## 2023-12-03 RX ADMIN — NITROGLYCERIN 0.4 MG: 0.4 TABLET, ORALLY DISINTEGRATING SUBLINGUAL at 04:44

## 2023-12-03 RX ADMIN — VERAPAMIL HYDROCHLORIDE 240 MG: 240 TABLET, FILM COATED, EXTENDED RELEASE ORAL at 12:24

## 2023-12-03 RX ADMIN — NITROGLYCERIN 0.4 MG: 0.4 TABLET, ORALLY DISINTEGRATING SUBLINGUAL at 04:37

## 2023-12-03 RX ADMIN — ISOSORBIDE MONONITRATE 120 MG: 60 TABLET, EXTENDED RELEASE ORAL at 20:20

## 2023-12-03 RX ADMIN — ATORVASTATIN CALCIUM 40 MG: 40 TABLET, FILM COATED ORAL at 20:21

## 2023-12-03 RX ADMIN — ASPIRIN 81 MG: 81 TABLET, COATED ORAL at 12:25

## 2023-12-03 RX ADMIN — FAMOTIDINE 20 MG: 20 TABLET ORAL at 12:25

## 2023-12-03 RX ADMIN — NITROGLYCERIN 0.4 MG: 0.4 TABLET, ORALLY DISINTEGRATING SUBLINGUAL at 04:48

## 2023-12-03 RX ADMIN — SODIUM CHLORIDE, PRESERVATIVE FREE 10 ML: 5 INJECTION INTRAVENOUS at 09:52

## 2023-12-03 RX ADMIN — MORPHINE SULFATE 2 MG: 2 INJECTION, SOLUTION INTRAMUSCULAR; INTRAVENOUS at 05:44

## 2023-12-03 RX ADMIN — FOLIC ACID 1 MG: 1 TABLET ORAL at 12:24

## 2023-12-03 RX ADMIN — HYDRALAZINE HYDROCHLORIDE 50 MG: 50 TABLET, FILM COATED ORAL at 14:59

## 2023-12-03 RX ADMIN — ISOSORBIDE MONONITRATE 120 MG: 60 TABLET, EXTENDED RELEASE ORAL at 12:24

## 2023-12-03 RX ADMIN — CLONIDINE HYDROCHLORIDE 0.3 MG: 0.2 TABLET ORAL at 20:20

## 2023-12-03 RX ADMIN — HEPARIN SODIUM 5000 UNITS: 5000 INJECTION INTRAVENOUS; SUBCUTANEOUS at 15:00

## 2023-12-03 RX ADMIN — CLONIDINE HYDROCHLORIDE 0.3 MG: 0.2 TABLET ORAL at 14:59

## 2023-12-03 RX ADMIN — ACETAMINOPHEN 650 MG: 325 TABLET ORAL at 12:25

## 2023-12-03 RX ADMIN — HYDRALAZINE HYDROCHLORIDE 50 MG: 50 TABLET, FILM COATED ORAL at 22:32

## 2023-12-03 RX ADMIN — SODIUM CHLORIDE, PRESERVATIVE FREE 10 ML: 5 INJECTION INTRAVENOUS at 20:20

## 2023-12-03 ASSESSMENT — PAIN DESCRIPTION - DESCRIPTORS
DESCRIPTORS: DISCOMFORT
DESCRIPTORS: DISCOMFORT
DESCRIPTORS: SHARP
DESCRIPTORS: ACHING;DISCOMFORT
DESCRIPTORS: ACHING;DISCOMFORT

## 2023-12-03 ASSESSMENT — PAIN - FUNCTIONAL ASSESSMENT
PAIN_FUNCTIONAL_ASSESSMENT: ACTIVITIES ARE NOT PREVENTED
PAIN_FUNCTIONAL_ASSESSMENT: PREVENTS OR INTERFERES SOME ACTIVE ACTIVITIES AND ADLS
PAIN_FUNCTIONAL_ASSESSMENT: NONE - DENIES PAIN
PAIN_FUNCTIONAL_ASSESSMENT: ACTIVITIES ARE NOT PREVENTED

## 2023-12-03 ASSESSMENT — PAIN DESCRIPTION - FREQUENCY
FREQUENCY: CONTINUOUS
FREQUENCY: CONTINUOUS
FREQUENCY: INTERMITTENT
FREQUENCY: INTERMITTENT

## 2023-12-03 ASSESSMENT — PAIN DESCRIPTION - LOCATION
LOCATION: CHEST
LOCATION: NECK;CHEST
LOCATION: CHEST;NECK
LOCATION: CHEST
LOCATION: CHEST;NECK

## 2023-12-03 ASSESSMENT — PAIN SCALES - GENERAL
PAINLEVEL_OUTOF10: 6
PAINLEVEL_OUTOF10: 3
PAINLEVEL_OUTOF10: 0
PAINLEVEL_OUTOF10: 6
PAINLEVEL_OUTOF10: 5
PAINLEVEL_OUTOF10: 3
PAINLEVEL_OUTOF10: 5
PAINLEVEL_OUTOF10: 6
PAINLEVEL_OUTOF10: 7
PAINLEVEL_OUTOF10: 4
PAINLEVEL_OUTOF10: 0
PAINLEVEL_OUTOF10: 0

## 2023-12-03 ASSESSMENT — PAIN DESCRIPTION - PAIN TYPE
TYPE: ACUTE PAIN

## 2023-12-03 ASSESSMENT — PAIN DESCRIPTION - ONSET
ONSET: ON-GOING

## 2023-12-03 ASSESSMENT — HEART SCORE: ECG: 1

## 2023-12-03 ASSESSMENT — PAIN DESCRIPTION - ORIENTATION
ORIENTATION: MID
ORIENTATION: MID;LEFT
ORIENTATION: LEFT
ORIENTATION: MID
ORIENTATION: LEFT

## 2023-12-03 ASSESSMENT — PAIN DESCRIPTION - DIRECTION: RADIATING_TOWARDS: LEFT SIDE OF NECK

## 2023-12-03 NOTE — ED TRIAGE NOTES
Pt presents to the ED from walking due to homelessness. Pt chief complaint is chest tightness that radiates to his neck going across his whole chest. Pt states he is in pain when he stands but not laying down. Pt states he feels like everyone hates him. Pt has depression. Pt states he has been homeless since October, has history of alcohol and drug use was clean for about a month and relapsed today at about 0100. Pt consumed about a pint of liquor and cocaine. Pt VSS and A&O x4.

## 2023-12-03 NOTE — FLOWSHEET NOTE
12/03/23 1004   Safe Environment   Safety Measures Call light within reach; Side rails X 2;Standard Safety Measures  (Virtual Nurse Safety Round)     VN completed safety rounds. Camera accessed for safety. Patient visualized alert in bed. No s/s of any distress noted. Doctor is currently in the room. Call bell and personal items within reach. No needs at this time.

## 2023-12-03 NOTE — ED NOTES
Pt resting in bed with eyes closed. Pt administered 3 doses of Nitro per order parameters. Pt states he still has chest pain rated 6/10. Pt has no requests at this time.       Tyra Serrano RN  12/03/23 6663

## 2023-12-03 NOTE — ED NOTES
Pt resting in bed with eyes closed. Pt breathing unlabored and regular. Pt call light within reach.       Nikko Lopez RN  12/03/23 7791

## 2023-12-03 NOTE — FLOWSHEET NOTE
12/03/23 7618   Safe Environment   Safety Measures Call light within reach; Side rails X 2;Standard Safety Measures  (Virtual Nurse Safety Round)     VN completed safety rounds. Camera accessed for safety. Patient visualized alert in bed. No s/s of any distress noted. Call bell and personal items within reach. No needs at this time.

## 2023-12-03 NOTE — PROGRESS NOTES
Pharmacist Review and Automatic Dose Adjustment of Prophylactic Enoxaparin      The reviewing pharmacist has made an adjustment to the ordered enoxaparin dose or converted to UFH per the approved [address] [facility] [place] protocol and table as identified below. Ophelia Covington is a 66 y.o. male. Recent Labs     12/03/23  0401   CREATININE 6.0*       Estimated Creatinine Clearance: 16 mL/min (A) (based on SCr of 6 mg/dL [facility])).     Height:   Ht Readings from Last 1 Encounters:   12/03/23 1.905 m (6' 3\")     Weight:  Wt Readings from Last 1 Encounters:   12/03/23 95 kg (209 lb 6.4 oz)               Plan: Based upon the patient's weight and renal function     Ordered: Enoxaparin 30 mg SUBQ daily     Changed/converted to     New Order: Heparin 5000 units SUBQ TID     Thank you,  Luciano Deshpande, [facility]  12/3/2023, 10:31 AM

## 2023-12-03 NOTE — ED PROVIDER NOTES
patient visit)  [facility] / ED COURSE:     1) Number and Complexity of Problems      Problem List This Visit:       No chief complaint on file. Differential Diagnosis includes (but not limited to):  Cocaine induced angina. Less likely is ACS. Pertinent Comorbid Conditions:    Multiple comorbidities, end-stage renal/dialysis. Relapse to cocaine/EtOH    2)  Data Reviewed (none if left blank)        My Independent interpretations:     EKG:      see ED course    Imaging: See ED Course    Labs:      See ED course          Decision Rules/Clinical Scores utilized:    History: 1  EC  Patient Age: 3  *Risk factors for Atherosclerotic disease: Cocaine abuse; Hypertension; Positive family History  Risk Factors: 2  Troponin: 2  Heart Score Total: 7            External Documentation Reviewed:         Previous patient encounter documents & history available on EMR was reviewed          See Formal Diagnostic Results above for the lab and radiology tests and orders. 3)  Treatment and Disposition         ED Reassessment:  see ed course         Case discussed with consulting clinician:           Shared Decision-Making was performed and disposition discussed with the        Patient/Family and questions answered         Social determinants of health impacting treatment or disposition:           Code Status: Not discussed during this ER encounter    Summary of Patient Presentation:    Medical Decision Making  /  ED Course as of 23 0604   Sun Dec 03, 2023   0352 BP(!): 184/79  Significant hypertension without tachycardia.  [JR]   0430 CBC with Auto Differential(!):    WBC 6.0   RBC 3.22(!)   Hemoglobin Quant 10.5(!)   Hematocrit 33.9(!)   .3(!)   MCH 32.6   MCHC 31.0(!)   RDW-CV 12.7   RDW-SD 49.5(!)   Platelet Count 204(!)   MPV 10.6   Seg Neutrophils 80.6   Lymphocytes 10.1   Monocytes 7.8   Eosinophils 0.8   Basophils 0.5   Immature Granulocytes 0.2   Segs Absolute 4.8   Lymphocytes

## 2023-12-03 NOTE — H&P
Hospitalist - History & Physical      Patient: Nagi Gonzalez    Unit/Bed:19/019A  YOB: 1965  MRN: [id number]   Acct: [de-identified]   PCP: WADE Velazquez - CNP    Date of Service: Pt seen/examined on 12/03/23  and Admitted to Inpatient with expected LOS greater than two midnights due to medical therapy. Chief Complaint:  Chest Pain     Assessment and Plan:      Atypical Chest pain: Chronically elevated Trops: Presented w/CP left sided, pressure / tightness, 7/10, w/radiation across chest w/deep breathing. EKG shows SR w/ist deg-AVB, LAE, LAD, significant LVH w/repol, no signs of ischemia; HsT 98 -> 83 (chronically elevated, much lower than trops 2-3 months ago); stress test in 2018 (-) ischemia; nuclear stress test 9/2023 (-) ischemia. HEART score 3, Cardiology consulted via ED. No clinical evidence for ACS at this time -> trops downtrending w/no signs of ischemia  C/w current medication regimen   Discussed avoidance of Cocaine and EtOH   HTN urgency / Uncontrolled HTN, Left WHITNEY: Chronic, Home medications extensive. /79 on arrival -> 176/87, currently. Resumed Home medications sequentially -> continue to add on as needed  Titrate as appropriate   Nephrology on board -> assistance w/HD and BP medications   Polysubstance abuse, EtOH / Cocaine: 1g cocaine + 1pint EtOH, H/o recurrent polysubstance abuse. Discussed avoidance of said drugs + asked if pt wanted assistance / seek counseling -> stated no at this time  Will revisit assistance alternatives again later during admission  Obstructive CM (Echo 5/2023 revealed severe LVH, EF 65-70%, CASSIE, Peak LVOT gradient 89 mmHg): LVH likely from chronic uncontrolled HTN. Followed by Cardiology. C/w home medications   ESRD on HD (2/2 HTN / FSGS): Electrolytes wnl, does not appear fluid overload. 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

## 2023-12-03 NOTE — PROGRESS NOTES
Pt admitted to  4K28 from ED. Complaints: Chest pain / discomfort. IV none infusing into the forearm right, condition patent and no redness. IV site free of s/s of infection or infiltration. Vital signs obtained. Assessment and data collection initiated. Two nurse skin assessment performed by [facility] RN and Marie Winter RN. Swallow Screen completed and documented for all patients ages 39 and older. Yes    If patient fails bedside swallow, obtain order for speech therapy consult and keep patient NPO. Policies and procedures for 4K explained. All questions answered with no further questions at this time. Fall prevention and safety brochure discussed with patient. Bed alarm on. Call light in reach. Oriented to room.

## 2023-12-03 NOTE — ED NOTES
Pt resting in bed with eyes closed. Pt states his chest feels tight and rates it 7/10. Pt medicated per MAR. Pt call light within reach.       Silver Price RN  12/03/23 1977

## 2023-12-03 NOTE — PROGRESS NOTES
Patient alert and oriented X3. Patient answered admission questions appropriately. Education provided. Demonstrated understanding of use of call bell. No s/s of any distress. Call bell within reach. Opportunity provided to voice questions or concerns.

## 2023-12-04 ENCOUNTER — APPOINTMENT (OUTPATIENT)
Dept: CT IMAGING | Age: 58
End: 2023-12-04
Payer: MEDICARE

## 2023-12-04 ENCOUNTER — HOSPITAL ENCOUNTER (EMERGENCY)
Age: 58
Discharge: HOME | End: 2023-12-05
Attending: EMERGENCY MEDICINE
Payer: MEDICARE

## 2023-12-04 ENCOUNTER — APPOINTMENT (OUTPATIENT)
Dept: GENERAL RADIOLOGY | Age: 58
End: 2023-12-04
Payer: MEDICARE

## 2023-12-04 VITALS
SYSTOLIC BLOOD PRESSURE: 147 MMHG | RESPIRATION RATE: 18 BRPM | HEART RATE: 76 BPM | OXYGEN SATURATION: 98 % | DIASTOLIC BLOOD PRESSURE: 73 MMHG | BODY MASS INDEX: 26.04 KG/M2 | WEIGHT: 209.4 LBS | HEIGHT: 75 IN | TEMPERATURE: 97.9 F
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
## 2023-12-04 DIAGNOSIS — R51.9 ACUTE NONINTRACTABLE HEADACHE, UNSPECIFIED HEADACHE TYPE: ICD-10-CM

## 2023-12-04 DIAGNOSIS — R07.89 ATYPICAL CHEST PAIN: Primary | ICD-10-CM

## 2023-12-04 LAB
ALBUMIN SERPL BCG-MCNC: 3.2 G/DL (ref 3.5–5.1)
ALP SERPL-CCNC: 156 U/L (ref 38–126)
ALT SERPL W/O P-5'-P-CCNC: 7 U/L (ref 11–66)
ANION GAP SERPL CALC-SCNC: 13 MEQ/L (ref 8–16)
ANION GAP SERPL CALC-SCNC: 14 MEQ/L (ref 8–16)
AST SERPL-CCNC: 10 U/L (ref 5–40)
BASOPHILS ABSOLUTE: 0 THOU/MM3 (ref 0–0.1)
BASOPHILS ABSOLUTE: 0 THOU/MM3 (ref 0–0.1)
BASOPHILS NFR BLD AUTO: 0.4 %
BASOPHILS NFR BLD AUTO: 0.5 %
BILIRUB CONJ SERPL-MCNC: < 0.2 MG/DL (ref 0–0.3)
BILIRUB SERPL-MCNC: 0.2 MG/DL (ref 0.3–1.2)
BUN SERPL-MCNC: 44 MG/DL (ref 7–22)
BUN SERPL-MCNC: 54 MG/DL (ref 7–22)
CA-I BLD ISE-SCNC: 1.01 MMOL/L (ref 1.12–1.32)
CALCIUM SERPL-MCNC: 7.7 MG/DL (ref 8.5–10.5)
CALCIUM SERPL-MCNC: 7.9 MG/DL (ref 8.5–10.5)
CHLORIDE SERPL-SCNC: 102 MEQ/L (ref 98–111)
CHLORIDE SERPL-SCNC: 98 MEQ/L (ref 98–111)
CO2 SERPL-SCNC: 25 MEQ/L (ref 23–33)
CO2 SERPL-SCNC: 26 MEQ/L (ref 23–33)
CREAT SERPL-MCNC: 10.3 MG/DL (ref 0.4–1.2)
CREAT SERPL-MCNC: 9 MG/DL (ref 0.4–1.2)
DEPRECATED RDW RBC AUTO: 50.2 FL (ref 35–45)
DEPRECATED RDW RBC AUTO: 50.4 FL (ref 35–45)
EOSINOPHIL NFR BLD AUTO: 1.9 %
EOSINOPHIL NFR BLD AUTO: 2.9 %
EOSINOPHILS ABSOLUTE: 0.1 THOU/MM3 (ref 0–0.4)
EOSINOPHILS ABSOLUTE: 0.1 THOU/MM3 (ref 0–0.4)
ERYTHROCYTE [DISTWIDTH] IN BLOOD BY AUTOMATED COUNT: 12.9 % (ref 11.5–14.5)
ERYTHROCYTE [DISTWIDTH] IN BLOOD BY AUTOMATED COUNT: 13 % (ref 11.5–14.5)
GFR SERPL CREATININE-BSD FRML MDRD: 5 ML/MIN/1.73M2
GFR SERPL CREATININE-BSD FRML MDRD: 6 ML/MIN/1.73M2
GLUCOSE BLD STRIP.AUTO-MCNC: 100 MG/DL (ref 70–108)
GLUCOSE SERPL-MCNC: 80 MG/DL (ref 70–108)
GLUCOSE SERPL-MCNC: 93 MG/DL (ref 70–108)
HCT VFR BLD AUTO: 28.9 % (ref 42–52)
HCT VFR BLD AUTO: 29.4 % (ref 42–52)
HGB BLD-MCNC: 8.9 GM/DL (ref 14–18)
HGB BLD-MCNC: 9 GM/DL (ref 14–18)
IMM GRANULOCYTES # BLD AUTO: 0.01 THOU/MM3 (ref 0–0.07)
IMM GRANULOCYTES # BLD AUTO: 0.01 THOU/MM3 (ref 0–0.07)
IMM GRANULOCYTES NFR BLD AUTO: 0.2 %
IMM GRANULOCYTES NFR BLD AUTO: 0.2 %
LYMPHOCYTES ABSOLUTE: 0.9 THOU/MM3 (ref 1–4.8)
LYMPHOCYTES ABSOLUTE: 0.9 THOU/MM3 (ref 1–4.8)
LYMPHOCYTES NFR BLD AUTO: 18.6 %
LYMPHOCYTES NFR BLD AUTO: 21.5 %
MCH RBC QN AUTO: 32.1 PG (ref 26–33)
MCH RBC QN AUTO: 32.6 PG (ref 26–33)
MCHC RBC AUTO-ENTMCNC: 30.6 GM/DL (ref 32.2–35.5)
MCHC RBC AUTO-ENTMCNC: 30.8 GM/DL (ref 32.2–35.5)
MCV RBC AUTO: 105 FL (ref 80–94)
MCV RBC AUTO: 105.9 FL (ref 80–94)
MONOCYTES ABSOLUTE: 0.5 THOU/MM3 (ref 0.4–1.3)
MONOCYTES ABSOLUTE: 0.5 THOU/MM3 (ref 0.4–1.3)
MONOCYTES NFR BLD AUTO: 10.4 %
MONOCYTES NFR BLD AUTO: 9.8 %
NEUTROPHILS NFR BLD AUTO: 64.5 %
NEUTROPHILS NFR BLD AUTO: 69.1 %
NRBC BLD AUTO-RTO: 0 /100 WBC
NRBC BLD AUTO-RTO: 0 /100 WBC
OSMOLALITY SERPL CALC.SUM OF ELEC: 287.6 MOSMOL/KG (ref 275–300)
PHOSPHATE SERPL-MCNC: 6.1 MG/DL (ref 2.4–4.7)
PLATELET # BLD AUTO: 105 THOU/MM3 (ref 130–400)
PLATELET # BLD AUTO: 110 THOU/MM3 (ref 130–400)
PMV BLD AUTO: 10.3 FL (ref 9.4–12.4)
PMV BLD AUTO: 10.7 FL (ref 9.4–12.4)
POTASSIUM SERPL-SCNC: 4.2 MEQ/L (ref 3.5–5.2)
POTASSIUM SERPL-SCNC: 4.4 MEQ/L (ref 3.5–5.2)
PROT SERPL-MCNC: 6.1 G/DL (ref 6.1–8)
PTH-INTACT SERPL-MCNC: 610.3 PG/ML (ref 15–65)
RBC # BLD AUTO: 2.73 MILL/MM3 (ref 4.7–6.1)
RBC # BLD AUTO: 2.8 MILL/MM3 (ref 4.7–6.1)
SEGMENTED NEUTROPHILS ABSOLUTE COUNT: 2.8 THOU/MM3 (ref 1.8–7.7)
SEGMENTED NEUTROPHILS ABSOLUTE COUNT: 3.2 THOU/MM3 (ref 1.8–7.7)
SODIUM SERPL-SCNC: 137 MEQ/L (ref 135–145)
SODIUM SERPL-SCNC: 141 MEQ/L (ref 135–145)
TROPONIN, HIGH SENSITIVITY: 88 NG/L (ref 0–12)
WBC # BLD AUTO: 4.4 THOU/MM3 (ref 4.8–10.8)
WBC # BLD AUTO: 4.7 THOU/MM3 (ref 4.8–10.8)

## 2023-12-04 PROCEDURE — 84484 ASSAY OF TROPONIN QUANT: CPT

## 2023-12-04 PROCEDURE — 99239 HOSP IP/OBS DSCHRG MGMT >30: CPT | Performed by: STUDENT IN AN ORGANIZED HEALTH CARE EDUCATION/TRAINING PROGRAM

## 2023-12-04 PROCEDURE — 99285 EMERGENCY DEPT VISIT HI MDM: CPT

## 2023-12-04 PROCEDURE — 72125 CT NECK SPINE W/O DYE: CPT

## 2023-12-04 PROCEDURE — 36415 COLL VENOUS BLD VENIPUNCTURE: CPT

## 2023-12-04 PROCEDURE — 93005 ELECTROCARDIOGRAM TRACING: CPT | Performed by: EMERGENCY MEDICINE

## 2023-12-04 PROCEDURE — 85025 COMPLETE CBC W/AUTO DIFF WBC: CPT

## 2023-12-04 PROCEDURE — 80076 HEPATIC FUNCTION PANEL: CPT

## 2023-12-04 PROCEDURE — 99232 SBSQ HOSP IP/OBS MODERATE 35: CPT | Performed by: INTERNAL MEDICINE

## 2023-12-04 PROCEDURE — 71045 X-RAY EXAM CHEST 1 VIEW: CPT

## 2023-12-04 PROCEDURE — 6370000000 HC RX 637 (ALT 250 FOR IP): Performed by: STUDENT IN AN ORGANIZED HEALTH CARE EDUCATION/TRAINING PROGRAM

## 2023-12-04 PROCEDURE — 70450 CT HEAD/BRAIN W/O DYE: CPT

## 2023-12-04 PROCEDURE — 80048 BASIC METABOLIC PNL TOTAL CA: CPT

## 2023-12-04 PROCEDURE — 82948 REAGENT STRIP/BLOOD GLUCOSE: CPT

## 2023-12-04 PROCEDURE — 84100 ASSAY OF PHOSPHORUS: CPT

## 2023-12-04 PROCEDURE — 82330 ASSAY OF CALCIUM: CPT

## 2023-12-04 PROCEDURE — 83880 ASSAY OF NATRIURETIC PEPTIDE: CPT

## 2023-12-04 PROCEDURE — 83970 ASSAY OF PARATHORMONE: CPT

## 2023-12-04 PROCEDURE — 6370000000 HC RX 637 (ALT 250 FOR IP): Performed by: INTERNAL MEDICINE

## 2023-12-04 PROCEDURE — 6360000002 HC RX W HCPCS: Performed by: STUDENT IN AN ORGANIZED HEALTH CARE EDUCATION/TRAINING PROGRAM

## 2023-12-04 PROCEDURE — 2580000003 HC RX 258: Performed by: STUDENT IN AN ORGANIZED HEALTH CARE EDUCATION/TRAINING PROGRAM

## 2023-12-04 RX ORDER — SEVELAMER CARBONATE 800 MG/1
800 TABLET, FILM COATED ORAL
Status: DISCONTINUED | OUTPATIENT
Start: 2023-12-04 | End: 2023-12-04 | Stop reason: HOSPADM

## 2023-12-04 RX ADMIN — HYDRALAZINE HYDROCHLORIDE 50 MG: 50 TABLET, FILM COATED ORAL at 05:28

## 2023-12-04 RX ADMIN — SEVELAMER CARBONATE 800 MG: 800 TABLET, FILM COATED ORAL at 11:48

## 2023-12-04 RX ADMIN — ASPIRIN 81 MG: 81 TABLET, COATED ORAL at 09:12

## 2023-12-04 RX ADMIN — SODIUM CHLORIDE, PRESERVATIVE FREE 10 ML: 5 INJECTION INTRAVENOUS at 09:14

## 2023-12-04 RX ADMIN — CALCITRIOL CAPSULES 0.25 MCG 0.5 MCG: 0.25 CAPSULE ORAL at 09:13

## 2023-12-04 RX ADMIN — ISOSORBIDE MONONITRATE 120 MG: 60 TABLET, EXTENDED RELEASE ORAL at 09:14

## 2023-12-04 RX ADMIN — FOLIC ACID 1 MG: 1 TABLET ORAL at 09:13

## 2023-12-04 RX ADMIN — CLONIDINE HYDROCHLORIDE 0.3 MG: 0.2 TABLET ORAL at 09:13

## 2023-12-04 RX ADMIN — VERAPAMIL HYDROCHLORIDE 240 MG: 240 TABLET, FILM COATED, EXTENDED RELEASE ORAL at 09:14

## 2023-12-04 RX ADMIN — FAMOTIDINE 20 MG: 20 TABLET ORAL at 09:12

## 2023-12-04 RX ADMIN — ACETAMINOPHEN 650 MG: 325 TABLET ORAL at 04:54

## 2023-12-04 ASSESSMENT — PAIN DESCRIPTION - DESCRIPTORS
DESCRIPTORS: ACHING
DESCRIPTORS: SHARP

## 2023-12-04 ASSESSMENT — PAIN DESCRIPTION - PAIN TYPE: TYPE: ACUTE PAIN

## 2023-12-04 ASSESSMENT — PAIN SCALES - GENERAL
PAINLEVEL_OUTOF10: 3
PAINLEVEL_OUTOF10: 4

## 2023-12-04 ASSESSMENT — PAIN DESCRIPTION - ONSET: ONSET: ON-GOING

## 2023-12-04 ASSESSMENT — PAIN - FUNCTIONAL ASSESSMENT
PAIN_FUNCTIONAL_ASSESSMENT: NONE - DENIES PAIN
PAIN_FUNCTIONAL_ASSESSMENT: ACTIVITIES ARE NOT PREVENTED

## 2023-12-04 ASSESSMENT — PAIN DESCRIPTION - FREQUENCY: FREQUENCY: CONTINUOUS

## 2023-12-04 ASSESSMENT — PAIN DESCRIPTION - ORIENTATION
ORIENTATION: LEFT
ORIENTATION: MID

## 2023-12-04 ASSESSMENT — PAIN DESCRIPTION - LOCATION
LOCATION: CHEST
LOCATION: CHEST;NECK

## 2023-12-04 NOTE — CARE COORDINATION
Case Management Assessment  Initial Evaluation    Date/Time of Evaluation: 12/4/2023 11:40 AM  Assessment Completed by: Patrick Sher RN    If patient is discharged prior to next notation, then this note serves as note for discharge by case management. Patient Name: Papito Wiley                   YOB: 1965  Diagnosis: Chest pain [R07.9]  Cocaine abuse ([address]) [F14.10]  ESRD needing dialysis ([address]) [N18.6, Z99.2]  Controlled type 2 diabetes mellitus with stage 5 chronic kidney disease not on chronic dialysis, without long-term current use of insulin ([address]) [E11.22, N18.5]  Chest pain, unspecified type [R07.9]  Abdominal aortic aneurysm (AAA) without rupture, unspecified part ([address]) [I71.40]                   Date / Time: 12/3/2023  3:36 AM  Location: [address] [place] [address]     Patient Admission Status: Inpatient   Readmission Risk Low 0-14, Mod 15-19), High > 20: Readmission Risk Score: 26.6    Current PCP: WADE Stover CNP  PCP verified by CM? Yes    Chart Reviewed: Yes      History Provided by: Patient  Patient Orientation: Alert and Oriented    Patient Cognition: Alert    Hospitalization in the last 30 days (Readmission):  No    If yes, Readmission Assessment in CM Navigator will be completed. Advance Directives:      Code Status: Full Code   Patient's Primary Decision Maker is: Patient Declined (Legal Next of Kin Remains as Decision Maker)      Discharge Planning:    Patient lives with: Family Members Type of Home: Apartment  Primary Care Giver: Self  Patient Support Systems include: Family Members   Current Financial resources: Medicaid, Medicare  Current community resources: None  Current services prior to admission: Other (Comment) (OP HD at [address] [place], [place] [address]R-Sat 0600)            Current DME:              Type of Home Care services:  None    ADLS  Prior functional level: Independent in ADLs/IADLs  Current functional level:  Independent in ADLs/IADLs    Family can provide assistance at DC:

## 2023-12-04 NOTE — CARE COORDINATION
12/4/23, 11:26 AM EST  DISCHARGE PLANNING EVALUATION    Patient requested to be seen by Soy Garzon spoke with patient about discharge planning. He asked SW, Tamika Joy do you do here\". MICHAEL explained that SW is able to assist with discharge planning which looks different for each patient. MICHAEL explained HH, ECF, and resources. He responded, \"o that's it? \" SW asked if there was something specific he needed support with. Patient could not answer what he needed and just stated that he wanted to see how SW could help him. He did [facility] [address] [place] [address] [facility]. SW offered cab and patient is agreeable. He asked for housing resources. SW provided housing resources to patient and placed cab form on the chart when patient is discharged. RN made aware. 12/4/23, 11:32 AM EST    Patient goals/plan/ treatment preferences discussed by  and . Patient goals/plan/ treatment preferences reviewed with patient/ family. Patient/ family verbalize understanding of discharge plan and are in agreement with goal/plan/treatment preferences. Understanding was demonstrated using the teach back method. AVS provided by RN at time of discharge, which includes all necessary medical information pertaining to the patients current course of illness, treatment, post-discharge goals of care, and treatment preferences.      Services At/After Discharge: None Cab ride

## 2023-12-04 NOTE — PROGRESS NOTES
Tresa Kirkpatrick, states patient came to nursing station and said he was able to walk to the front door on his own while this RN was in another room. Tresa Kirkpatrick, said she offered to escort patient to the hospital entrance, but patient declined this offer. By the time this RN came out of other patient's room, patient was already gone. Care released.

## 2023-12-04 NOTE — DISCHARGE SUMMARY
Internal Medicine Resident Discharge Summary      Patient Identification:   Vera Sierra   : 1965  MRN: [id number]   Account: [de-identified]      Patient's PCP: WADE Matias - CNP    Admit Date: 12/3/2023     Discharge Date: 2023      Admitting Physician: Africa Oneal MD     Discharge Physician: Yee Glaser MD       Hospital Course:   Per original HPI:  \" Vera Sierra is a 66 y.o. 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 CP began early AM after pt had a relapse and used Cocaine (~1g) + EtOh (~1pint) -> In ED, CP left sided, pressure / tightness, 10, w/radiation across chest w/deep breathing. ED course: Afebrile, /79, RR 18 w/SpO2 95% on RA, HR 88. Labs remarkable for K 4.3, BUN 27, eGFR 10, Cr 6.0 (much lower than his apparent baseline range), HsT 98 -> 83 (chronically elevated, much lower than trops 2-3 months ago). Hb 10.5 w/.3, Plts 128 (chronically low, appears upper limit of pt's normal range). CXR mod-severe cardiomegaly but no acute process. EKG shows SR w/ist deg-AVB, LAE, LAD, significant LVH w/repol, no signs of ischemia. Pt was given Nitro / Morphine in ED. Cardiology contacted per ED note. \" - Nahid Mejia MD, 12/3/2023.    2023 - Patient seen and examined in the AM. Ongoing left neck/shoulder pain, states worse with movement of head side to side. No other new concerns today. States understanding regarding adherence to antihypertensive regimen.  Is agreeable to discharge with close follow

## 2023-12-04 NOTE — DISCHARGE INSTRUCTIONS
F/up with cardiologist 2-4 weeks    Follow up with PCP in 3 days for blood pressure check and medication management. Take all medications as prescribed.

## 2023-12-04 NOTE — PROGRESS NOTES
Follow up appointment scheduled w/ thoracic surgery, Dr. Dane Siddiqi, per Dr. Joann Prajapati verbal readback order.

## 2023-12-05 VITALS
HEART RATE: 85 BPM | TEMPERATURE: 98.6 F | OXYGEN SATURATION: 96 % | DIASTOLIC BLOOD PRESSURE: 89 MMHG | RESPIRATION RATE: 18 BRPM | SYSTOLIC BLOOD PRESSURE: 175 MMHG

## 2023-12-05 LAB
BACTERIA SPEC AEROBE CULT: NORMAL
NT-PROBNP SERPL IA-MCNC: ABNORMAL PG/ML (ref 0–124)

## 2023-12-05 PROCEDURE — 93010 ELECTROCARDIOGRAM REPORT: CPT | Performed by: INTERNAL MEDICINE

## 2023-12-05 PROCEDURE — 6370000000 HC RX 637 (ALT 250 FOR IP)

## 2023-12-05 RX ORDER — ACETAMINOPHEN 325 MG/1
650 TABLET ORAL ONCE
Status: COMPLETED | OUTPATIENT
Start: 2023-12-05 | End: 2023-12-05

## 2023-12-05 RX ORDER — HYDRALAZINE HYDROCHLORIDE 50 MG/1
50 TABLET, FILM COATED ORAL ONCE
Status: COMPLETED | OUTPATIENT
Start: 2023-12-05 | End: 2023-12-05

## 2023-12-05 RX ORDER — ATORVASTATIN CALCIUM 40 MG/1
40 TABLET, FILM COATED ORAL ONCE
Status: COMPLETED | OUTPATIENT
Start: 2023-12-05 | End: 2023-12-05

## 2023-12-05 RX ADMIN — HYDRALAZINE HYDROCHLORIDE 50 MG: 50 TABLET, FILM COATED ORAL at 00:29

## 2023-12-05 RX ADMIN — ATORVASTATIN CALCIUM 40 MG: 40 TABLET, FILM COATED ORAL at 00:29

## 2023-12-05 RX ADMIN — CLONIDINE HYDROCHLORIDE 0.3 MG: 0.2 TABLET ORAL at 00:28

## 2023-12-05 RX ADMIN — ACETAMINOPHEN 650 MG: 325 TABLET ORAL at 00:28

## 2023-12-05 NOTE — ED NOTES
Pt to ED via [facility] c/o shortness of breath and dizziness. EMS states patient was discharged from the hospital today around 1300. Pt was supposed to be staying with a friend but they never answered their door. Pt states he was unable to go back home because he is locked out. Pt states he walked around for several hours trying to find somewhere to go. Pt states his medications are in the house with his friend and he is unable to get to them. Pt states he went to a classmates house and they called the squad for him. VSS. EKG complete.         Denise Helm RN  12/04/23 6235

## 2023-12-05 NOTE — ED NOTES
This RN attempted to call his niece and friend Meeta Keating whom he was supposed to stay with with no response.       Boaz Turpin RN  12/04/23 1862

## 2023-12-05 NOTE — ED NOTES
Pt requesting tylenol for headache and something for his blood pressure. Providers made aware.       Tennis Spare, RN  12/04/23 8316

## 2023-12-06 LAB
EKG ATRIAL RATE: 81 BPM
EKG ATRIAL RATE: 89 BPM
EKG P AXIS: 60 DEGREES
EKG P AXIS: 67 DEGREES
EKG P-R INTERVAL: 214 MS
EKG P-R INTERVAL: 222 MS
EKG Q-T INTERVAL: 430 MS
EKG Q-T INTERVAL: 432 MS
EKG QRS DURATION: 104 MS
EKG QRS DURATION: 106 MS
EKG QTC CALCULATION (BAZETT): 501 MS
EKG QTC CALCULATION (BAZETT): 523 MS
EKG R AXIS: -15 DEGREES
EKG R AXIS: -44 DEGREES
EKG T AXIS: 102 DEGREES
EKG T AXIS: 90 DEGREES
EKG VENTRICULAR RATE: 81 BPM
EKG VENTRICULAR RATE: 89 BPM

## 2023-12-23 ENCOUNTER — HOSPITAL ENCOUNTER (INPATIENT)
Age: 58
LOS: 2 days | Discharge: HOME | DRG: 640 | End: 2023-12-25
Attending: EMERGENCY MEDICINE | Admitting: STUDENT IN AN ORGANIZED HEALTH CARE EDUCATION/TRAINING PROGRAM
Payer: MEDICARE

## 2023-12-23 ENCOUNTER — APPOINTMENT (OUTPATIENT)
Dept: GENERAL RADIOLOGY | Age: 58
DRG: 640 | End: 2023-12-23
Payer: MEDICARE

## 2023-12-23 DIAGNOSIS — E87.70 HYPERVOLEMIA, UNSPECIFIED HYPERVOLEMIA TYPE: Primary | ICD-10-CM

## 2023-12-23 DIAGNOSIS — G47.33 OSA (OBSTRUCTIVE SLEEP APNEA): ICD-10-CM

## 2023-12-23 PROBLEM — J96.21 ACUTE ON CHRONIC HYPOXIC RESPIRATORY FAILURE (HCC): Status: ACTIVE | Noted: 2023-12-23

## 2023-12-23 LAB
ANION GAP SERPL CALC-SCNC: 18 MEQ/L (ref 8–16)
BASOPHILS ABSOLUTE: 0 THOU/MM3 (ref 0–0.1)
BASOPHILS NFR BLD AUTO: 0.6 %
BUN SERPL-MCNC: 82 MG/DL (ref 7–22)
CALCIUM SERPL-MCNC: 8.5 MG/DL (ref 8.5–10.5)
CHLORIDE SERPL-SCNC: 100 MEQ/L (ref 98–111)
CO2 SERPL-SCNC: 23 MEQ/L (ref 23–33)
CREAT SERPL-MCNC: 10.7 MG/DL (ref 0.4–1.2)
DEPRECATED RDW RBC AUTO: 50.4 FL (ref 35–45)
EOSINOPHIL NFR BLD AUTO: 1.8 %
EOSINOPHILS ABSOLUTE: 0.1 THOU/MM3 (ref 0–0.4)
ERYTHROCYTE [DISTWIDTH] IN BLOOD BY AUTOMATED COUNT: 13.1 % (ref 11.5–14.5)
GFR SERPL CREATININE-BSD FRML MDRD: 5 ML/MIN/1.73M2
GLUCOSE SERPL-MCNC: 86 MG/DL (ref 70–108)
HCT VFR BLD AUTO: 32.4 % (ref 42–52)
HGB BLD-MCNC: 10.1 GM/DL (ref 14–18)
IMM GRANULOCYTES # BLD AUTO: 0.01 THOU/MM3 (ref 0–0.07)
IMM GRANULOCYTES NFR BLD AUTO: 0.2 %
LYMPHOCYTES ABSOLUTE: 0.8 THOU/MM3 (ref 1–4.8)
LYMPHOCYTES NFR BLD AUTO: 15.4 %
MCH RBC QN AUTO: 33 PG (ref 26–33)
MCHC RBC AUTO-ENTMCNC: 31.2 GM/DL (ref 32.2–35.5)
MCV RBC AUTO: 105.9 FL (ref 80–94)
MONOCYTES ABSOLUTE: 0.5 THOU/MM3 (ref 0.4–1.3)
MONOCYTES NFR BLD AUTO: 8.5 %
NEUTROPHILS NFR BLD AUTO: 73.5 %
NRBC BLD AUTO-RTO: 0 /100 WBC
NT-PROBNP SERPL IA-MCNC: ABNORMAL PG/ML (ref 0–124)
OSMOLALITY SERPL CALC.SUM OF ELEC: 305.3 MOSMOL/KG (ref 275–300)
PLATELET # BLD AUTO: 113 THOU/MM3 (ref 130–400)
PMV BLD AUTO: 10.1 FL (ref 9.4–12.4)
POTASSIUM SERPL-SCNC: 5.2 MEQ/L (ref 3.5–5.2)
RBC # BLD AUTO: 3.06 MILL/MM3 (ref 4.7–6.1)
SEGMENTED NEUTROPHILS ABSOLUTE COUNT: 4 THOU/MM3 (ref 1.8–7.7)
SODIUM SERPL-SCNC: 141 MEQ/L (ref 135–145)
TROPONIN, HIGH SENSITIVITY: 123 NG/L (ref 0–12)
WBC # BLD AUTO: 5.4 THOU/MM3 (ref 4.8–10.8)

## 2023-12-23 PROCEDURE — 84484 ASSAY OF TROPONIN QUANT: CPT

## 2023-12-23 PROCEDURE — 85025 COMPLETE CBC W/AUTO DIFF WBC: CPT

## 2023-12-23 PROCEDURE — 83880 ASSAY OF NATRIURETIC PEPTIDE: CPT

## 2023-12-23 PROCEDURE — 80048 BASIC METABOLIC PNL TOTAL CA: CPT

## 2023-12-23 PROCEDURE — 2580000003 HC RX 258: Performed by: STUDENT IN AN ORGANIZED HEALTH CARE EDUCATION/TRAINING PROGRAM

## 2023-12-23 PROCEDURE — 6360000002 HC RX W HCPCS: Performed by: STUDENT IN AN ORGANIZED HEALTH CARE EDUCATION/TRAINING PROGRAM

## 2023-12-23 PROCEDURE — 6360000002 HC RX W HCPCS: Performed by: EMERGENCY MEDICINE

## 2023-12-23 PROCEDURE — 6370000000 HC RX 637 (ALT 250 FOR IP): Performed by: EMERGENCY MEDICINE

## 2023-12-23 PROCEDURE — 93005 ELECTROCARDIOGRAM TRACING: CPT

## 2023-12-23 PROCEDURE — 96374 THER/PROPH/DIAG INJ IV PUSH: CPT

## 2023-12-23 PROCEDURE — 71046 X-RAY EXAM CHEST 2 VIEWS: CPT

## 2023-12-23 PROCEDURE — 99285 EMERGENCY DEPT VISIT HI MDM: CPT

## 2023-12-23 PROCEDURE — 2060000000 HC ICU INTERMEDIATE R&B

## 2023-12-23 PROCEDURE — 36415 COLL VENOUS BLD VENIPUNCTURE: CPT

## 2023-12-23 RX ORDER — POLYETHYLENE GLYCOL 3350 17 G/17G
17 POWDER, FOR SOLUTION ORAL DAILY PRN
Status: DISCONTINUED | OUTPATIENT
Start: 2023-12-23 | End: 2023-12-25 | Stop reason: HOSPADM

## 2023-12-23 RX ORDER — HYDRALAZINE HYDROCHLORIDE 50 MG/1
50 TABLET, FILM COATED ORAL EVERY 8 HOURS SCHEDULED
Status: DISCONTINUED | OUTPATIENT
Start: 2023-12-23 | End: 2023-12-25 | Stop reason: HOSPADM

## 2023-12-23 RX ORDER — FOLIC ACID 1 MG/1
1 TABLET ORAL DAILY
Status: DISCONTINUED | OUTPATIENT
Start: 2023-12-24 | End: 2023-12-25 | Stop reason: HOSPADM

## 2023-12-23 RX ORDER — CINACALCET 30 MG/1
150 TABLET, FILM COATED ORAL
Status: DISCONTINUED | OUTPATIENT
Start: 2023-12-25 | End: 2023-12-25 | Stop reason: HOSPADM

## 2023-12-23 RX ORDER — ONDANSETRON 2 MG/ML
4 INJECTION INTRAMUSCULAR; INTRAVENOUS EVERY 6 HOURS PRN
Status: DISCONTINUED | OUTPATIENT
Start: 2023-12-23 | End: 2023-12-25 | Stop reason: HOSPADM

## 2023-12-23 RX ORDER — TRAZODONE HYDROCHLORIDE 50 MG/1
50 TABLET ORAL NIGHTLY PRN
Status: DISCONTINUED | OUTPATIENT
Start: 2023-12-23 | End: 2023-12-25 | Stop reason: HOSPADM

## 2023-12-23 RX ORDER — SERTRALINE HYDROCHLORIDE 100 MG/1
100 TABLET, FILM COATED ORAL DAILY
Status: DISCONTINUED | OUTPATIENT
Start: 2023-12-24 | End: 2023-12-25 | Stop reason: HOSPADM

## 2023-12-23 RX ORDER — CALCITRIOL 0.25 UG/1
0.5 CAPSULE, LIQUID FILLED ORAL
Status: DISCONTINUED | OUTPATIENT
Start: 2023-12-25 | End: 2023-12-25 | Stop reason: HOSPADM

## 2023-12-23 RX ORDER — ATORVASTATIN CALCIUM 40 MG/1
40 TABLET, FILM COATED ORAL NIGHTLY
Status: DISCONTINUED | OUTPATIENT
Start: 2023-12-23 | End: 2023-12-25 | Stop reason: HOSPADM

## 2023-12-23 RX ORDER — SODIUM CHLORIDE 0.9 % (FLUSH) 0.9 %
5-40 SYRINGE (ML) INJECTION EVERY 12 HOURS SCHEDULED
Status: DISCONTINUED | OUTPATIENT
Start: 2023-12-23 | End: 2023-12-25 | Stop reason: HOSPADM

## 2023-12-23 RX ORDER — ONDANSETRON 4 MG/1
4 TABLET, ORALLY DISINTEGRATING ORAL EVERY 8 HOURS PRN
Status: DISCONTINUED | OUTPATIENT
Start: 2023-12-23 | End: 2023-12-25 | Stop reason: HOSPADM

## 2023-12-23 RX ORDER — HEPARIN SODIUM 5000 [USP'U]/ML
5000 INJECTION, SOLUTION INTRAVENOUS; SUBCUTANEOUS EVERY 8 HOURS SCHEDULED
Status: DISCONTINUED | OUTPATIENT
Start: 2023-12-23 | End: 2023-12-25 | Stop reason: HOSPADM

## 2023-12-23 RX ORDER — ACETAMINOPHEN 650 MG/1
650 SUPPOSITORY RECTAL EVERY 6 HOURS PRN
Status: DISCONTINUED | OUTPATIENT
Start: 2023-12-23 | End: 2023-12-25 | Stop reason: HOSPADM

## 2023-12-23 RX ORDER — FERROUS SULFATE 325(65) MG
325 TABLET ORAL
Status: DISCONTINUED | OUTPATIENT
Start: 2023-12-24 | End: 2023-12-25 | Stop reason: HOSPADM

## 2023-12-23 RX ORDER — SODIUM CHLORIDE 9 MG/ML
INJECTION, SOLUTION INTRAVENOUS PRN
Status: DISCONTINUED | OUTPATIENT
Start: 2023-12-23 | End: 2023-12-25 | Stop reason: HOSPADM

## 2023-12-23 RX ORDER — VERAPAMIL HYDROCHLORIDE 240 MG/1
240 TABLET, FILM COATED, EXTENDED RELEASE ORAL DAILY
Status: DISCONTINUED | OUTPATIENT
Start: 2023-12-24 | End: 2023-12-25 | Stop reason: HOSPADM

## 2023-12-23 RX ORDER — HYDRALAZINE HYDROCHLORIDE 20 MG/ML
10 INJECTION INTRAMUSCULAR; INTRAVENOUS ONCE
Status: COMPLETED | OUTPATIENT
Start: 2023-12-23 | End: 2023-12-23

## 2023-12-23 RX ORDER — SODIUM CHLORIDE 0.9 % (FLUSH) 0.9 %
5-40 SYRINGE (ML) INJECTION PRN
Status: DISCONTINUED | OUTPATIENT
Start: 2023-12-23 | End: 2023-12-25 | Stop reason: HOSPADM

## 2023-12-23 RX ORDER — VITAMIN E 268 MG
400 CAPSULE ORAL DAILY
Status: DISCONTINUED | OUTPATIENT
Start: 2023-12-24 | End: 2023-12-25 | Stop reason: HOSPADM

## 2023-12-23 RX ORDER — VITAMIN B COMPLEX
1000 TABLET ORAL 2 TIMES DAILY
Status: DISCONTINUED | OUTPATIENT
Start: 2023-12-24 | End: 2023-12-25 | Stop reason: HOSPADM

## 2023-12-23 RX ORDER — ASPIRIN 81 MG/1
81 TABLET ORAL DAILY
Status: DISCONTINUED | OUTPATIENT
Start: 2023-12-24 | End: 2023-12-25 | Stop reason: HOSPADM

## 2023-12-23 RX ORDER — FAMOTIDINE 20 MG/1
20 TABLET, FILM COATED ORAL DAILY
Status: DISCONTINUED | OUTPATIENT
Start: 2023-12-24 | End: 2023-12-25 | Stop reason: HOSPADM

## 2023-12-23 RX ORDER — MULTIVITAMIN WITH IRON
1 TABLET ORAL DAILY
Status: DISCONTINUED | OUTPATIENT
Start: 2023-12-24 | End: 2023-12-25 | Stop reason: HOSPADM

## 2023-12-23 RX ORDER — ISOSORBIDE MONONITRATE 60 MG/1
120 TABLET, EXTENDED RELEASE ORAL 2 TIMES DAILY
Status: DISCONTINUED | OUTPATIENT
Start: 2023-12-24 | End: 2023-12-25 | Stop reason: HOSPADM

## 2023-12-23 RX ORDER — ACETAMINOPHEN 325 MG/1
650 TABLET ORAL EVERY 6 HOURS PRN
Status: DISCONTINUED | OUTPATIENT
Start: 2023-12-23 | End: 2023-12-25 | Stop reason: HOSPADM

## 2023-12-23 RX ADMIN — SODIUM CHLORIDE, PRESERVATIVE FREE 10 ML: 5 INJECTION INTRAVENOUS at 23:09

## 2023-12-23 RX ADMIN — HEPARIN SODIUM 5000 UNITS: 5000 INJECTION INTRAVENOUS; SUBCUTANEOUS at 23:09

## 2023-12-23 RX ADMIN — CLONIDINE HYDROCHLORIDE 0.3 MG: 0.2 TABLET ORAL at 21:54

## 2023-12-23 RX ADMIN — HYDRALAZINE HYDROCHLORIDE 10 MG: 20 INJECTION, SOLUTION INTRAMUSCULAR; INTRAVENOUS at 21:55

## 2023-12-24 LAB
ALBUMIN SERPL BCG-MCNC: 3.5 G/DL (ref 3.5–5.1)
ALP SERPL-CCNC: 152 U/L (ref 38–126)
ALT SERPL W/O P-5'-P-CCNC: 12 U/L (ref 11–66)
ANION GAP SERPL CALC-SCNC: 13 MEQ/L (ref 8–16)
AST SERPL-CCNC: 13 U/L (ref 5–40)
BASOPHILS ABSOLUTE: 0 THOU/MM3 (ref 0–0.1)
BASOPHILS NFR BLD AUTO: 0.8 %
BILIRUB SERPL-MCNC: 0.3 MG/DL (ref 0.3–1.2)
BUN SERPL-MCNC: 89 MG/DL (ref 7–22)
CALCIUM SERPL-MCNC: 7.6 MG/DL (ref 8.5–10.5)
CHLORIDE SERPL-SCNC: 102 MEQ/L (ref 98–111)
CO2 SERPL-SCNC: 23 MEQ/L (ref 23–33)
CREAT SERPL-MCNC: 11.2 MG/DL (ref 0.4–1.2)
DEPRECATED RDW RBC AUTO: 50.6 FL (ref 35–45)
EOSINOPHIL NFR BLD AUTO: 3 %
EOSINOPHILS ABSOLUTE: 0.2 THOU/MM3 (ref 0–0.4)
ERYTHROCYTE [DISTWIDTH] IN BLOOD BY AUTOMATED COUNT: 13.2 % (ref 11.5–14.5)
GFR SERPL CREATININE-BSD FRML MDRD: 5 ML/MIN/1.73M2
GLUCOSE SERPL-MCNC: 98 MG/DL (ref 70–108)
HCT VFR BLD AUTO: 29 % (ref 42–52)
HGB BLD-MCNC: 8.8 GM/DL (ref 14–18)
IMM GRANULOCYTES # BLD AUTO: 0.02 THOU/MM3 (ref 0–0.07)
IMM GRANULOCYTES NFR BLD AUTO: 0.4 %
LYMPHOCYTES ABSOLUTE: 0.8 THOU/MM3 (ref 1–4.8)
LYMPHOCYTES NFR BLD AUTO: 16.7 %
MCH RBC QN AUTO: 32.2 PG (ref 26–33)
MCHC RBC AUTO-ENTMCNC: 30.3 GM/DL (ref 32.2–35.5)
MCV RBC AUTO: 106.2 FL (ref 80–94)
MONOCYTES ABSOLUTE: 0.5 THOU/MM3 (ref 0.4–1.3)
MONOCYTES NFR BLD AUTO: 9.2 %
MRSA DNA SPEC QL NAA+PROBE: NEGATIVE
NEUTROPHILS NFR BLD AUTO: 69.9 %
NRBC BLD AUTO-RTO: 0 /100 WBC
OSMOLALITY SERPL CALC.SUM OF ELEC: 302.9 MOSMOL/KG (ref 275–300)
PLATELET # BLD AUTO: 107 THOU/MM3 (ref 130–400)
PMV BLD AUTO: 10 FL (ref 9.4–12.4)
POTASSIUM SERPL-SCNC: 5 MEQ/L (ref 3.5–5.2)
POTASSIUM SERPL-SCNC: 6.7 MEQ/L (ref 3.5–5.2)
PROT SERPL-MCNC: 6.6 G/DL (ref 6.1–8)
RBC # BLD AUTO: 2.73 MILL/MM3 (ref 4.7–6.1)
SEGMENTED NEUTROPHILS ABSOLUTE COUNT: 3.5 THOU/MM3 (ref 1.8–7.7)
SODIUM SERPL-SCNC: 138 MEQ/L (ref 135–145)
WBC # BLD AUTO: 5 THOU/MM3 (ref 4.8–10.8)

## 2023-12-24 PROCEDURE — 87070 CULTURE OTHR SPECIMN AEROBIC: CPT

## 2023-12-24 PROCEDURE — 99222 1ST HOSP IP/OBS MODERATE 55: CPT | Performed by: INTERNAL MEDICINE

## 2023-12-24 PROCEDURE — 6370000000 HC RX 637 (ALT 250 FOR IP): Performed by: INTERNAL MEDICINE

## 2023-12-24 PROCEDURE — 6370000000 HC RX 637 (ALT 250 FOR IP): Performed by: STUDENT IN AN ORGANIZED HEALTH CARE EDUCATION/TRAINING PROGRAM

## 2023-12-24 PROCEDURE — 85025 COMPLETE CBC W/AUTO DIFF WBC: CPT

## 2023-12-24 PROCEDURE — 90935 HEMODIALYSIS ONE EVALUATION: CPT | Performed by: INTERNAL MEDICINE

## 2023-12-24 PROCEDURE — 6360000002 HC RX W HCPCS: Performed by: STUDENT IN AN ORGANIZED HEALTH CARE EDUCATION/TRAINING PROGRAM

## 2023-12-24 PROCEDURE — 87641 MR-STAPH DNA AMP PROBE: CPT

## 2023-12-24 PROCEDURE — 90935 HEMODIALYSIS ONE EVALUATION: CPT

## 2023-12-24 PROCEDURE — 80053 COMPREHEN METABOLIC PANEL: CPT

## 2023-12-24 PROCEDURE — 84132 ASSAY OF SERUM POTASSIUM: CPT

## 2023-12-24 PROCEDURE — 36415 COLL VENOUS BLD VENIPUNCTURE: CPT

## 2023-12-24 PROCEDURE — 1200000003 HC TELEMETRY R&B

## 2023-12-24 PROCEDURE — 5A1D70Z PERFORMANCE OF URINARY FILTRATION, INTERMITTENT, LESS THAN 6 HOURS PER DAY: ICD-10-PCS | Performed by: STUDENT IN AN ORGANIZED HEALTH CARE EDUCATION/TRAINING PROGRAM

## 2023-12-24 PROCEDURE — 2580000003 HC RX 258: Performed by: STUDENT IN AN ORGANIZED HEALTH CARE EDUCATION/TRAINING PROGRAM

## 2023-12-24 PROCEDURE — 93010 ELECTROCARDIOGRAM REPORT: CPT | Performed by: NUCLEAR MEDICINE

## 2023-12-24 RX ADMIN — HEPARIN SODIUM 5000 UNITS: 5000 INJECTION INTRAVENOUS; SUBCUTANEOUS at 05:48

## 2023-12-24 RX ADMIN — FAMOTIDINE 20 MG: 20 TABLET, FILM COATED ORAL at 12:30

## 2023-12-24 RX ADMIN — ISOSORBIDE MONONITRATE 120 MG: 60 TABLET, EXTENDED RELEASE ORAL at 19:40

## 2023-12-24 RX ADMIN — FOLIC ACID 1 MG: 1 TABLET ORAL at 12:30

## 2023-12-24 RX ADMIN — ATORVASTATIN CALCIUM 40 MG: 40 TABLET, FILM COATED ORAL at 19:40

## 2023-12-24 RX ADMIN — HYDRALAZINE HYDROCHLORIDE 50 MG: 50 TABLET ORAL at 23:19

## 2023-12-24 RX ADMIN — Medication 1000 UNITS: at 19:40

## 2023-12-24 RX ADMIN — Medication 1000 UNITS: at 12:29

## 2023-12-24 RX ADMIN — ISOSORBIDE MONONITRATE 120 MG: 60 TABLET, EXTENDED RELEASE ORAL at 12:30

## 2023-12-24 RX ADMIN — CLONIDINE HYDROCHLORIDE 0.3 MG: 0.2 TABLET ORAL at 12:30

## 2023-12-24 RX ADMIN — ASPIRIN 81 MG: 81 TABLET, COATED ORAL at 12:29

## 2023-12-24 RX ADMIN — VERAPAMIL HYDROCHLORIDE 240 MG: 240 TABLET, FILM COATED, EXTENDED RELEASE ORAL at 12:30

## 2023-12-24 RX ADMIN — CLONIDINE HYDROCHLORIDE 0.3 MG: 0.2 TABLET ORAL at 00:42

## 2023-12-24 RX ADMIN — FERROUS SULFATE TAB 325 MG (65 MG ELEMENTAL FE) 325 MG: 325 (65 FE) TAB at 15:34

## 2023-12-24 RX ADMIN — FERROUS SULFATE TAB 325 MG (65 MG ELEMENTAL FE) 325 MG: 325 (65 FE) TAB at 12:30

## 2023-12-24 RX ADMIN — Medication 1 TABLET: at 12:29

## 2023-12-24 RX ADMIN — HEPARIN SODIUM 5000 UNITS: 5000 INJECTION INTRAVENOUS; SUBCUTANEOUS at 13:52

## 2023-12-24 RX ADMIN — HYDRALAZINE HYDROCHLORIDE 50 MG: 50 TABLET ORAL at 12:31

## 2023-12-24 RX ADMIN — HYDRALAZINE HYDROCHLORIDE 50 MG: 50 TABLET ORAL at 00:41

## 2023-12-24 RX ADMIN — HEPARIN SODIUM 5000 UNITS: 5000 INJECTION INTRAVENOUS; SUBCUTANEOUS at 23:19

## 2023-12-24 RX ADMIN — Medication 400 UNITS: at 12:30

## 2023-12-24 RX ADMIN — ATORVASTATIN CALCIUM 40 MG: 40 TABLET, FILM COATED ORAL at 00:42

## 2023-12-24 RX ADMIN — ISOSORBIDE MONONITRATE 120 MG: 60 TABLET, EXTENDED RELEASE ORAL at 02:47

## 2023-12-24 RX ADMIN — HYDRALAZINE HYDROCHLORIDE 50 MG: 50 TABLET ORAL at 05:47

## 2023-12-24 RX ADMIN — SODIUM CHLORIDE, PRESERVATIVE FREE 10 ML: 5 INJECTION INTRAVENOUS at 19:41

## 2023-12-24 RX ADMIN — SODIUM ZIRCONIUM CYCLOSILICATE 10 G: 10 POWDER, FOR SUSPENSION ORAL at 19:40

## 2023-12-24 RX ADMIN — CLONIDINE HYDROCHLORIDE 0.3 MG: 0.2 TABLET ORAL at 19:40

## 2023-12-24 RX ADMIN — SERTRALINE 100 MG: 100 TABLET, FILM COATED ORAL at 12:29

## 2023-12-24 NOTE — CONSULTS
Patient seen and examined on dialysis  Tolerating hemodialysis treatment well  Ultrafiltration goal for 4.5 to 5 kg  Using 2K bath  Blood pressure 150/83  Blood flow rate of 500  Discussed with HD RN  Encourage compliance with fluid and dietary restriction  Full consult note to follow
restriction. Advised patient to be compliant with dialysis treatments  Hyperkalemia. Will use 2K bath, increase treatment time  Volume overload secondary to noncompliance and dietary indiscretion. Will increase ultrafiltration goal  Hypertensive urgency. Improving  History of substance use  Anemia in ESRD. Monitor H&H, add Retacrit if needed  Secondary hyperparathyroidism    D/W patient and dialysis staff    Thank you for the consult. Please feel free to call me if you have any questions. Payal Sainz MD  [facility]    This report has been created using voice recognition software. It may contain minor errors which are inherent in voice recognition technology.

## 2023-12-24 NOTE — ED NOTES
Patient resting in bed, respirations even and unlabored at this time.
Spoke to ASHELY Landa to approve pt transport to [facility] in stable condition.
THORACOTOMY Right 10/10/2022    Right Thoracotomy & Decortication with Partial Lung Resection performed by Matt Kam MD at [address] Left 07/08/2016    AV Fistula    VASCULAR SURGERY Right 1990    Surgery on Achilles tendon       PAST MEDICAL HISTORY       Past Medical History:   Diagnosis Date    AAA (abdominal aortic aneurysm) ([address])     NURIS (acute kidney injury) ([address]) 9/24/2015    Anemia associated with chronic renal failure     Arthritis     stated in hands    Cocaine abuse ([address]) 5/10/2014    Depression     Diabetes mellitus ([address])     pt states he no longer has diabetes he has lost alot of davion.      FSGS (focal segmental glomerulosclerosis) 5/23/2013    Hemodialysis patient ([address]) 10/17/2016    on hemod[facility] with [facility] [address] [place] [address] 1/16/2012    History of blood transfusion     Hyperlipidemia     Hyperphosphatemia 5/21/2016    Hypertension     Left renal artery stenosis ([address]) 5/22/2014    Monoclonal (M) protein disease, multiple 'M' protein     Nicotine dependence 6/16/2014    Noncompliance     Pneumonia     Psychiatric problem     PTSD (post-traumatic stress disorder)     Pt vet from [place]    Secondary hyperparathyroidism (of renal origin)     Sleep apnea            Electronically signed by Chelly Mota RN on 12/23/2023 at 10:36 PM

## 2023-12-24 NOTE — ED TRIAGE NOTES
Patient presents to ED from home with report of SOB and missed dialysis today. Patient states he \"should be wearing\" 3-4 L O2 NC at home, but has not been consistently. Patient arrives and is 74% on room air. 4L O2 NC applied and patient is now 93%.  Patient is A/Ox4 and ambulatory on arrival.

## 2023-12-24 NOTE — CARE COORDINATION
12/24/23 1150   Service Assessment   Patient Orientation Alert and Oriented   Cognition Alert   History Provided By Patient;Medical Record   Primary Caregiver Self   Accompanied By/Relationship n/a   Support Systems Family Members; Other (Comment)  (OP HD at [facility]: T-R-S at 0600)   [address] is: Patient Declined (Legal Next of [address] as Decision Maker)   PCP Verified by CM Yes   Last Visit to PCP Within last 3 months   Prior Functional Level Independent in ADLs/IADLs   Current Functional Level Independent in ADLs/IADLs   Can patient return to prior living arrangement Yes   Ability to make needs known: Good   Family able to assist with home care needs: Yes   Would you like for me to discuss the discharge plan with any other family members/significant others, and if so, who? No   Financial Resources Medicaid; Medicare   Community Resources Other (Comment)  (OP HD at [facility]: T-R-S at 0600)   Social/Functional History   Active  No   Patient's [address] transportation   Discharge Planning   Type of Residence Apartment   Living Arrangements Family Members  (Niece)   Current Services Prior To Admission None   Potential Assistance Needed Transportation   DME Ordered? No   Potential Assistance Purchasing Medications No   Type of Home Care Services None   Patient expects to be discharged to: [address] Discharge   Confirm Follow Up Transport Cab  (Anticipate needing a cab ride home)     Patient Goals/Plan/Treatment Preferences: Plans home with family. Current with OP HD at [facility]: T-R-S at 0600. Monitor for needs. If patient is discharged prior to next notation, then this note serves as note for discharge by case management.

## 2023-12-24 NOTE — PLAN OF CARE
Problem: Discharge Planning  Goal: Discharge to home or other facility with appropriate resources  Outcome: Progressing  Flowsheets (Taken 12/23/2023 2347)  Discharge to home or other facility with appropriate resources:   Identify barriers to discharge with patient and caregiver   Arrange for needed discharge resources and transportation as appropriate   Identify discharge learning needs (meds, wound care, etc)     Problem: Safety - Adult  Goal: Free from fall injury  Outcome: Progressing  Flowsheets (Taken 12/24/2023 0404)  Free From Fall Injury:   Instruct family/caregiver on patient safety   Based on caregiver fall risk screen, instruct family/caregiver to ask for assistance with transferring infant if caregiver noted to have fall risk factors     Problem: Respiratory - Adult  Goal: Achieves optimal ventilation and oxygenation  Outcome: Progressing  Flowsheets (Taken 12/23/2023 2347)  Achieves optimal ventilation and oxygenation:   Assess for changes in respiratory status   Assess for changes in mentation and behavior   Position to facilitate oxygenation and minimize respiratory effort   Oxygen supplementation based on oxygen saturation or arterial blood gases     Problem: Pain  Goal: Verbalizes/displays adequate comfort level or baseline comfort level  Outcome: Progressing  Flowsheets (Taken 12/24/2023 0404)  Verbalizes/displays adequate comfort level or baseline comfort level:   Encourage patient to monitor pain and request assistance   Assess pain using appropriate pain scale   Administer analgesics based on type and severity of pain and evaluate response   Implement non-pharmacological measures as appropriate and evaluate response     Problem: Chronic Conditions and Co-morbidities  Goal: Patient's chronic conditions and co-morbidity symptoms are monitored and maintained or improved  Outcome: Progressing  Flowsheets (Taken 12/23/2023 2347)  Care Plan - Patient's Chronic Conditions and Co-Morbidity Symptoms

## 2023-12-24 NOTE — FLOWSHEET NOTE
12/24/23 1138   Safe Environment   Safety Measures Other (comment)  (Virtual Nurse Safety Round)     VN called into patients room and introduced myself and role. Patient did not answer. Video activated for safety check. Patient not in room.

## 2023-12-24 NOTE — ED PROVIDER NOTES
[facility] DEPT  EMERGENCY DEPARTMENT ENCOUNTER          Pt Name: Murali Drummond  MRN: [id number]  [address] [facility] 1965  Date of evaluation: 2023  Physician: Santy Ricks MD  Supervising Attending Physician: Jodi Salas MD     CHIEF COMPLAINT       Chief Complaint   Patient presents with    Shortness of Breath         HISTORY OF PRESENT ILLNESS    HPI  Murali Drummond is a 66 y.o. male with a history of end-stage renal disease secondary to FSGS and hypertension, on  dialysis, polysubstance abuse, status postthoracotomy, ejection fraction 65 to 70% on echo 2023 presents to the ED for evaluation of shortness of breath. Patient describes that he was not able to attend dialysis today secondary to attending a  for his mother and dealing with the grief. Here in the ED, he notes that he is feeling short of breath, has a dry cough, has increased peripheral edema. He is supposed to be on home oxygen, but is currently experiencing housing instabil and does not have access to the oxygen that he needs. Initial oxygen on room air in the ED was 74%, improved to 90s on 4L. Pain    REVIEW OF SYSTEMS   Review of Systems  As above in HPI. PAST MEDICAL AND SURGICAL HISTORY     Past Medical History:   Diagnosis Date    AAA (abdominal aortic aneurysm) ([facility] )     NURIS (acute kidney injury) ([facility]) 2015    Anemia associated with chronic renal failure     Arthritis     stated in hands    Cocaine abuse ([address]) 5/10/2014    Depression     Diabetes mellitus ([facility])     pt states he no longer has diabetes he has lost alot of davion.      FSGS (focal segmental glomerulosclerosis) 2013    Hemodialysis patient ([address]) 10/17/2016    on hemodialysis with [facility]    Hemorrhoids 2012    History of blood transfusion     Hyperlipidemia     Hyperphosphatemia 2016    Hypertension     Left renal artery stenosis ([address]) 2014

## 2023-12-24 NOTE — CARE COORDINATION
12/24/23 1156   Readmission Assessment   Number of Days since last admission? 8-30 days   Previous Disposition Home with Family   Who is being Interviewed Patient   What was the patient's/caregiver's perception as to why they think they needed to return back to the hospital? Other (Comment)  (SOB, missed dialysis)   Did you visit your Primary Care Physician after you left the hospital, before you returned this time? Yes   Did you see a specialist, such as Cardiac, Pulmonary, Orthopedic Physician, etc. after you left the hospital? Yes   Who advised the patient to return to the hospital? Self-referral   Does the patient report anything that got in the way of taking their medications? No   In our efforts to provide the best possible care to you and others like you, can you think of anything that we could have done to help you after you left the hospital the first time, so that you might not have needed to return so soon?  Other (Comment)  (Nothing)

## 2023-12-24 NOTE — H&P
Internal Medicine Resident History and Physical          Patient: Buhl Coma  : 1965  MRN: [id number]     Acct: [de-identified]    PCP: WADE Cheney CNP  Date of Admission: 2023  Date of Service: Pt seen/examined on 23  and Admitted to Observation with expected LOS less than two midnights due to medical therapy. Assessment and Plan:  Acute on chronic hypoxic respiratory failure: 2/2 patient missing dialysis/fluid overload. On RA at baseline outpt. Hx of supplemental O2 in the past. Patient endorses missing dialysis today due to his mother's .  Dialysis center said they could not get him in until Wednesday. Patient typically does dialysis Tuesday, Thursday, Saturday. Patient was short of breath and presented to hospital with O2 sat 74%. Placed on 4 L NC.  -last note recommended follow up w/ PCP w/ possible referral to pulm. -Treat fluid overload w/ HD tomorrow. Hypertensive urgency. Presented to [facility] ED with BP: 211/91. Likely 2/2 not taking evening meds and fluid overload. OP medications include clonidine 0.3mg TID, doxazosin, hydralazine 50 TID, Imdur 120, and verapamil 240mg. Patient received Catapres 0.3 x 2 in ED and on floor, hydralazine 10 IV x 1 in ED, and hydralazine 50 x 1 on floor, also received Imdur 120 on floor. Current /89.  -Resume home meds  -Compliance strongly encouraged  -Follow up with PCP and cardiology for further management. ESRD, on HD. Patient has S&S of fluid overload at this time. Endorses orthopnea, has +4 pitting edema, rhonchi on physical exam, is currently requiring 4 L O2 NC. In setting of uncontrolled HTN and FSGS. Oliguric. Accessed through left arm AV fistula. Last HD, 2023. Follows with Dr. Nancy Haskins.  -HD in AM .  -Continue HD as scheduled  -Follow up with nephrology as scheduled.  -On fluid restriction and salt restricted diet currently. Obstructive hypertrophic cardiomyopathy.   Likely d/t

## 2023-12-25 VITALS
WEIGHT: 204.15 LBS | TEMPERATURE: 98.4 F | BODY MASS INDEX: 25.38 KG/M2 | DIASTOLIC BLOOD PRESSURE: 92 MMHG | OXYGEN SATURATION: 93 % | HEIGHT: 75 IN | RESPIRATION RATE: 14 BRPM | HEART RATE: 79 BPM | SYSTOLIC BLOOD PRESSURE: 136 MMHG

## 2023-12-25 LAB
ANION GAP SERPL CALC-SCNC: 11 MEQ/L (ref 8–16)
BUN SERPL-MCNC: 47 MG/DL (ref 7–22)
CALCIUM SERPL-MCNC: 7.9 MG/DL (ref 8.5–10.5)
CHLORIDE SERPL-SCNC: 100 MEQ/L (ref 98–111)
CO2 SERPL-SCNC: 26 MEQ/L (ref 23–33)
CREAT SERPL-MCNC: 7.3 MG/DL (ref 0.4–1.2)
DEPRECATED MEAN GLUCOSE BLD GHB EST-ACNC: 105 MG/DL (ref 70–126)
DEPRECATED RDW RBC AUTO: 50 FL (ref 35–45)
ERYTHROCYTE [DISTWIDTH] IN BLOOD BY AUTOMATED COUNT: 13.1 % (ref 11.5–14.5)
GFR SERPL CREATININE-BSD FRML MDRD: 8 ML/MIN/1.73M2
GLUCOSE SERPL-MCNC: 116 MG/DL (ref 70–108)
HBA1C MFR BLD HPLC: 5.5 % (ref 4.4–6.4)
HCT VFR BLD AUTO: 28 % (ref 42–52)
HGB BLD-MCNC: 8.6 GM/DL (ref 14–18)
MCH RBC QN AUTO: 32.3 PG (ref 26–33)
MCHC RBC AUTO-ENTMCNC: 30.7 GM/DL (ref 32.2–35.5)
MCV RBC AUTO: 105.3 FL (ref 80–94)
PLATELET # BLD AUTO: 101 THOU/MM3 (ref 130–400)
PMV BLD AUTO: 10.5 FL (ref 9.4–12.4)
POTASSIUM SERPL-SCNC: 5.1 MEQ/L (ref 3.5–5.2)
RBC # BLD AUTO: 2.66 MILL/MM3 (ref 4.7–6.1)
SODIUM SERPL-SCNC: 137 MEQ/L (ref 135–145)
WBC # BLD AUTO: 4.2 THOU/MM3 (ref 4.8–10.8)

## 2023-12-25 PROCEDURE — 36415 COLL VENOUS BLD VENIPUNCTURE: CPT

## 2023-12-25 PROCEDURE — 2580000003 HC RX 258: Performed by: STUDENT IN AN ORGANIZED HEALTH CARE EDUCATION/TRAINING PROGRAM

## 2023-12-25 PROCEDURE — 85027 COMPLETE CBC AUTOMATED: CPT

## 2023-12-25 PROCEDURE — 6360000002 HC RX W HCPCS: Performed by: STUDENT IN AN ORGANIZED HEALTH CARE EDUCATION/TRAINING PROGRAM

## 2023-12-25 PROCEDURE — 83036 HEMOGLOBIN GLYCOSYLATED A1C: CPT

## 2023-12-25 PROCEDURE — 99232 SBSQ HOSP IP/OBS MODERATE 35: CPT | Performed by: INTERNAL MEDICINE

## 2023-12-25 PROCEDURE — 80048 BASIC METABOLIC PNL TOTAL CA: CPT

## 2023-12-25 PROCEDURE — 99239 HOSP IP/OBS DSCHRG MGMT >30: CPT | Performed by: INTERNAL MEDICINE

## 2023-12-25 PROCEDURE — 94761 N-INVAS EAR/PLS OXIMETRY MLT: CPT

## 2023-12-25 PROCEDURE — 6370000000 HC RX 637 (ALT 250 FOR IP): Performed by: INTERNAL MEDICINE

## 2023-12-25 PROCEDURE — 6370000000 HC RX 637 (ALT 250 FOR IP): Performed by: STUDENT IN AN ORGANIZED HEALTH CARE EDUCATION/TRAINING PROGRAM

## 2023-12-25 RX ADMIN — FERROUS SULFATE TAB 325 MG (65 MG ELEMENTAL FE) 325 MG: 325 (65 FE) TAB at 07:58

## 2023-12-25 RX ADMIN — HYDRALAZINE HYDROCHLORIDE 50 MG: 50 TABLET ORAL at 06:05

## 2023-12-25 RX ADMIN — CALCITRIOL CAPSULES 0.25 MCG 0.5 MCG: 0.25 CAPSULE ORAL at 07:58

## 2023-12-25 RX ADMIN — Medication 1 TABLET: at 07:58

## 2023-12-25 RX ADMIN — SERTRALINE 100 MG: 100 TABLET, FILM COATED ORAL at 07:58

## 2023-12-25 RX ADMIN — FAMOTIDINE 20 MG: 20 TABLET, FILM COATED ORAL at 07:57

## 2023-12-25 RX ADMIN — Medication 1000 UNITS: at 07:57

## 2023-12-25 RX ADMIN — SODIUM CHLORIDE, PRESERVATIVE FREE 10 ML: 5 INJECTION INTRAVENOUS at 07:56

## 2023-12-25 RX ADMIN — HEPARIN SODIUM 5000 UNITS: 5000 INJECTION INTRAVENOUS; SUBCUTANEOUS at 06:05

## 2023-12-25 RX ADMIN — SODIUM ZIRCONIUM CYCLOSILICATE 10 G: 10 POWDER, FOR SUSPENSION ORAL at 07:57

## 2023-12-25 RX ADMIN — ASPIRIN 81 MG: 81 TABLET, COATED ORAL at 07:56

## 2023-12-25 RX ADMIN — VERAPAMIL HYDROCHLORIDE 240 MG: 240 TABLET, FILM COATED, EXTENDED RELEASE ORAL at 07:58

## 2023-12-25 RX ADMIN — CLONIDINE HYDROCHLORIDE 0.3 MG: 0.2 TABLET ORAL at 07:58

## 2023-12-25 RX ADMIN — FOLIC ACID 1 MG: 1 TABLET ORAL at 07:58

## 2023-12-25 RX ADMIN — Medication 400 UNITS: at 07:58

## 2023-12-25 RX ADMIN — CINACALCET HYDROCHLORIDE 150 MG: 30 TABLET, FILM COATED ORAL at 07:57

## 2023-12-25 RX ADMIN — ISOSORBIDE MONONITRATE 120 MG: 60 TABLET, EXTENDED RELEASE ORAL at 07:57

## 2023-12-25 NOTE — PLAN OF CARE
Patient was evaluated today for the diagnosis of  ESRD and cardiomyopathy . I entered a DME order for home oxygen at 2 lpm because the diagnosis and testing require the patient to have supplemental oxygen. Condition will improve or be benefited by oxygen use. The patient is  able to perform good mobility in a home setting and therefore does require the use of a portable oxygen system. The need for this equipment was discussed with the patient and he understands and is in agreement.      Electronically signed by Celina Randhawa DO on 12/25/2023 at 10:10 AM

## 2023-12-25 NOTE — PROGRESS NOTES
A home oxygen evaluation has been completed. [x]Patient is an inpatient. It is expected that the patient will be discharged within the next 48 hours. Qualified provider to write order for home prescription if patient qualifies. Social service/care managers will arrange for home oxygen. If patient is active, arrange for Home Medical supplier to assess for Oxygen Conserving Device per pulse oximetry. []Patient is an outpatient. Results will be faxed to the ordering provider. Qualified provider to write order for home prescription if patient qualifies and arranges for home oxygen. Patient has been on room air for at least 60 minutes. SpO2 was 93% on room air at rest. Patients SpO2 was 89% or above and did not qualify for home oxygen. Patient was walked for 4 minutes. SpO2 was 87% during walking. Patients SpO2 was below 89% and qualified for home oxygen. Oxygen was applied at 2 lpm via nasal cannula to maintain a SpO2 between 90-92% while walking. Actual SpO2 was 92%. Nurse notified of results.
D/C instructions added, med details completed all but Vitamin D order d/t instructions stating 2 different directions on amount of medication to take. Cara Greene RN made aware by VN on above information via phone.
Discharge teaching and instructions for diagnosis/procedure of acute on chronic respiratory failure completed with patient using teachback method. AVS reviewed. Printed prescriptions given to patient. Patient voiced understanding regarding prescriptions, follow up appointments, and care of self at home.  Discharged in a wheelchair to  home with support per  black and white cab
Internal Medicine Resident Progress Note    Name: Lopez Montejo, male, : 1965, MRN: [id number]    PCP: WADE Roca - CNP    Date of Admission: 2023  Date of Service: Pt seen/examined on 23      Assessment/Plan:  Acute on chronic hypoxic respiratory failure: 2/2 fluid overload due to missing dialysis versus not having supplemental oxygen at home. Patient states he missed dialysis on Saturday due to his mother's .  Patient's normal repeat dialysis schedule Tuesday, Thursday, Saturday. Patient initially required 4 L NC has been weaned to room air, however patient intermittently having SpO2 < 90%. -Home O2 eval in a.m. Hypertensive urgency. Presented to [facility] ED with BP: 211/91. Likely 2/2 not taking evening meds and fluid overload. OP medications include clonidine 0.3mg TID, doxazosin, hydralazine 50 TID, Imdur 120, and verapamil 240mg. Patient received Catapres 0.3 x 2 in ED and on floor, hydralazine 10 IV x 1 in ED, and hydralazine 50 x 1 on floor, also received Imdur 120 on floor.   -Resume home meds  -Compliance strongly encouraged  -Follow up with PCP and cardiology for further management. ESRD, on HD. In setting of uncontrolled HTN and FSGS. Oliguric. Accessed through left arm AV fistula. Last HD prior to admission, 2023. Follows with Dr. Merna Bowser.  Patient underwent HD on .  -Continue HD as scheduled  -Follow up with nephrology as scheduled.  -On fluid restriction and salt restricted diet currently. Obstructive hypertrophic cardiomyopathy. Likely d/t uncontrolled HTN. Per Echo 2023, severe LVH, LVEF 65-70%, peak LVOT gradient 89 mmHg.  -Per discharge note 2023, follow up with cardiology in 2-3 weeks. Do not see record of this being done in [facility]. Polysubstance abuse, primarily alcohol and cocaine at this time. Patient reports last use a month and a half ago.  -Strongly encourage continued cessation     Anemia, chronic, macrocytic.
Pt admitted to  4K16 from ED. Complaints: Shortness of breath. IV none infusing into the antecubital right, condition patent and no redness, IV site free of s/s of infection or infiltration. Vital signs obtained. Assessment and data collection initiated. Two nurse skin assessment performed by Keira Ramos RN and Vick Andrews RN. Swallow Screen completed and documented for all patients ages 39 and older. Pass  If patient fails bedside swallow, obtain order for speech therapy consult and keep patient NPO. Policies and procedures for 4K explained. All questions answered with no further questions at this time. Fall prevention and safety brochure discussed with patient. Bed alarm on. Call light in reach. Oriented to room.
This Virtual RN Completed admission requirements with patient at bedside. This nurse educated patient to use the call light to report any change in condition to the bedside nursing staff. Call light is within reach.
TID WC    folic acid  1 mg Oral Daily    hydrALAZINE  50 mg Oral 3 times per day    isosorbide mononitrate  120 mg Oral BID    multivitamin  1 tablet Oral Daily    sertraline  100 mg Oral Daily    verapamil  240 mg Oral Daily    Vitamin D  1,000 Units Oral BID    vitamin E  400 Units Oral Daily     Meds prn: sodium chloride flush, sodium chloride, ondansetron **OR** ondansetron, polyethylene glycol, acetaminophen **OR** acetaminophen, traZODone     Lab Data :  CBC:   Recent Labs     12/23/23 2021 12/24/23  0402 12/25/23  0351   WBC 5.4 5.0 4.2*   HGB 10.1* 8.8* 8.6*   HCT 32.4* 29.0* 28.0*   * 107* 101*     CMP:  Recent Labs     12/23/23 2021 12/24/23 0402 12/24/23  1605 12/25/23  0351    138  --  137   K 5.2 6.7* 5.0 5.1    102  --  100   CO2 23 23  --  26   BUN 82* 89*  --  47*   CREATININE 10.7* 11.2*  --  7.3*   GLUCOSE 86 98  --  116*   CALCIUM 8.5 7.6*  --  7.9*     Hepatic:   Recent Labs     12/24/23 0402   LABALBU 3.5   AST 13   ALT 12   BILITOT 0.3   ALKPHOS 152*         Assessment and Plan:  ESRD on hemodialysis TTS  Had hemodialysis treatment yesterday. No acute need for RRT today  4.5 L of fluid was removed yesterday  Patient advised to continue his dialysis treatment as scheduled if discharged today  Hyperkalemia. Resolved  Volume overload. Improved. Advised salt and fluid restriction  Hypertension  Anemia in ESRD    D/W patient  Discussed with hospitalist service    Kenia Gonzalez MD  [facility]    This report has been created using voice recognition software.  It may contain minor errors which are inherent in voice recognition technology

## 2023-12-25 NOTE — PLAN OF CARE
Problem: Discharge Planning  Goal: Discharge to home or other facility with appropriate resources  Outcome: Progressing  Flowsheets (Taken 12/24/2023 1934)  Discharge to home or other facility with appropriate resources:   Identify barriers to discharge with patient and caregiver   Identify discharge learning needs (meds, wound care, etc)     Problem: Safety - Adult  Goal: Free from fall injury  Outcome: Progressing  Flowsheets (Taken 12/25/2023 0244)  Free From Fall Injury: Instruct family/caregiver on patient safety     Problem: Respiratory - Adult  Goal: Achieves optimal ventilation and oxygenation  Outcome: Progressing  Flowsheets (Taken 12/24/2023 1934)  Achieves optimal ventilation and oxygenation:   Assess for changes in respiratory status   Assess for changes in mentation and behavior   Position to facilitate oxygenation and minimize respiratory effort   Oxygen supplementation based on oxygen saturation or arterial blood gases   Assess the need for suctioning and aspirate as needed   Assess and instruct to report shortness of breath or any respiratory difficulty   Respiratory therapy support as indicated     Problem: Pain  Goal: Verbalizes/displays adequate comfort level or baseline comfort level  Outcome: Progressing  Flowsheets (Taken 12/24/2023 1934)  Verbalizes/displays adequate comfort level or baseline comfort level:   Encourage patient to monitor pain and request assistance   Assess pain using appropriate pain scale   Administer analgesics based on type and severity of pain and evaluate response   Implement non-pharmacological measures as appropriate and evaluate response   Consider cultural and social influences on pain and pain management     Problem: Skin/Tissue Integrity  Goal: Absence of new skin breakdown  Description: 1. Monitor for areas of redness and/or skin breakdown  2. Assess vascular access sites hourly  3. Every 4-6 hours minimum:  Change oxygen saturation probe site  4.   Every 4-6

## 2023-12-25 NOTE — DISCHARGE INSTRUCTIONS
Continue with dialysis as scheduled; Tuesday, Thursday, Saturday. Complete regular labs with dialysis. Referral sent for sleep clinic due to possibility of sleep apnea and needing CPAP/BiPAP.

## 2023-12-25 NOTE — DISCHARGE SUMMARY
Resident Discharge Summary (Hospitalist)      Patient Identification:   Chula López   : 1965  MRN: [id number]   Account: [de-identified]   Patient's PCP: WADE Brady CNP    Admit Date: 2023   Discharge Date: 2023     Admitting Physician: No admitting provider for patient encounter. Discharge Physician: Katlyn Montiel DO       Discharge Diagnoses:  Acute on chronic hypoxic respiratory failure: 2/2 fluid overload due to missing dialysis versus not having supplemental oxygen at home. Patient states he missed dialysis on Saturday due to his mother's .  Patient's normal repeat dialysis schedule Tuesday, Thursday, Saturday. Patient initially required 4 L NC has been weaned to room air, however patient intermittently having SpO2 < 90%. Patient underwent home O2 eval, patient requires 2 L NC with activity and none at rest. DME order for home O2 placed. Hypertensive urgency, resolved. Presented to [facility] ED with BP: 211/91. Likely 2/2 not taking evening meds and fluid overload. OP medications include clonidine 0.3mg TID, doxazosin, hydralazine 50 TID, Imdur 120, and verapamil 240mg. Patient received Catapres 0.3 x 2 in ED and on floor, hydralazine 10 IV x 1 in ED, and hydralazine 50 x 1 on floor, also received Imdur 120 on floor. Resolved with home medications. Patient to continue home antihypertensives. Compliance strongly encouraged. ESRD, on HD. In setting of uncontrolled HTN and FSGS. Oliguric. Accessed through left arm AV fistula. Last HD prior to admission, 2023. Follows with Dr. Rudell Snellen.  Patient underwent HD on . Patient to continue HD as scheduled. Patient to continue to follow-up with nephrology. Compliance to fluid restriction and salt restriction encouraged. Obstructive hypertrophic cardiomyopathy. Likely d/t uncontrolled HTN. Per Echo 2023, severe LVH, LVEF 65-70%, peak LVOT gradient 89 mmHg.  Per discharge note

## 2023-12-26 LAB
BACTERIA SPEC AEROBE CULT: NORMAL
EKG ATRIAL RATE: 92 BPM
EKG P AXIS: 58 DEGREES
EKG P-R INTERVAL: 202 MS
EKG Q-T INTERVAL: 408 MS
EKG QRS DURATION: 100 MS
EKG QTC CALCULATION (BAZETT): 504 MS
EKG R AXIS: -40 DEGREES
EKG T AXIS: 96 DEGREES
EKG VENTRICULAR RATE: 92 BPM

## 2024-02-03 ENCOUNTER — APPOINTMENT (OUTPATIENT)
Dept: CT IMAGING | Age: 59
End: 2024-02-03
Payer: MEDICARE

## 2024-02-03 ENCOUNTER — HOSPITAL ENCOUNTER (OUTPATIENT)
Age: 59
Setting detail: OBSERVATION
Discharge: HOME | End: 2024-02-06
Attending: STUDENT IN AN ORGANIZED HEALTH CARE EDUCATION/TRAINING PROGRAM
Payer: MEDICARE

## 2024-02-03 DIAGNOSIS — R10.13 ABDOMINAL PAIN, EPIGASTRIC: ICD-10-CM

## 2024-02-03 DIAGNOSIS — R06.02 SHORTNESS OF BREATH: ICD-10-CM

## 2024-02-03 DIAGNOSIS — R07.9 CHEST PAIN, UNSPECIFIED TYPE: ICD-10-CM

## 2024-02-03 DIAGNOSIS — R11.0 NAUSEA: Primary | ICD-10-CM

## 2024-02-03 DIAGNOSIS — I71.02 ABDOMINAL AORTIC ANEURYSM DISSECTION (HCC): ICD-10-CM

## 2024-02-03 DIAGNOSIS — R94.31 PROLONGED QT INTERVAL: ICD-10-CM

## 2024-02-03 PROBLEM — R10.9 ABDOMINAL PAIN: Status: ACTIVE | Noted: 2024-02-03

## 2024-02-03 LAB
ABO: NORMAL
ALBUMIN SERPL BCG-MCNC: 3.8 G/DL (ref 3.5–5.1)
ALP SERPL-CCNC: 108 U/L (ref 38–126)
ALT SERPL W/O P-5'-P-CCNC: 6 U/L (ref 11–66)
ANION GAP SERPL CALC-SCNC: 13 MEQ/L (ref 8–16)
ANTIBODY SCREEN: NORMAL
APTT PPP: 32.4 SECONDS (ref 22–38)
AST SERPL-CCNC: 12 U/L (ref 5–40)
BASOPHILS ABSOLUTE: 0 THOU/MM3 (ref 0–0.1)
BASOPHILS NFR BLD AUTO: 0.4 %
BILIRUB SERPL-MCNC: 0.3 MG/DL (ref 0.3–1.2)
BUN SERPL-MCNC: 28 MG/DL (ref 7–22)
CA-I BLD ISE-SCNC: 0.97 MMOL/L (ref 1.12–1.32)
CALCIUM SERPL-MCNC: 7.5 MG/DL (ref 8.5–10.5)
CHLORIDE SERPL-SCNC: 100 MEQ/L (ref 98–111)
CO2 SERPL-SCNC: 28 MEQ/L (ref 23–33)
CREAT SERPL-MCNC: 5.9 MG/DL (ref 0.4–1.2)
DEPRECATED RDW RBC AUTO: 49.1 FL (ref 35–45)
EOSINOPHIL NFR BLD AUTO: 2.3 %
EOSINOPHILS ABSOLUTE: 0.1 THOU/MM3 (ref 0–0.4)
ERYTHROCYTE [DISTWIDTH] IN BLOOD BY AUTOMATED COUNT: 12.9 % (ref 11.5–14.5)
FLUAV RNA RESP QL NAA+PROBE: NOT DETECTED
FLUBV RNA RESP QL NAA+PROBE: NOT DETECTED
GFR SERPL CREATININE-BSD FRML MDRD: 10 ML/MIN/1.73M2
GLUCOSE SERPL-MCNC: 105 MG/DL (ref 70–108)
HCT VFR BLD AUTO: 30.5 % (ref 42–52)
HGB BLD-MCNC: 9.6 GM/DL (ref 14–18)
IMM GRANULOCYTES # BLD AUTO: 0.02 THOU/MM3 (ref 0–0.07)
IMM GRANULOCYTES NFR BLD AUTO: 0.4 %
INR PPP: 1.02 (ref 0.85–1.13)
LACTIC ACID, SEPSIS: 0.7 MMOL/L (ref 0.5–1.9)
LACTIC ACID, SEPSIS: 0.7 MMOL/L (ref 0.5–1.9)
LIPASE SERPL-CCNC: 56.2 U/L (ref 5.6–51.3)
LYMPHOCYTES ABSOLUTE: 0.7 THOU/MM3 (ref 1–4.8)
LYMPHOCYTES NFR BLD AUTO: 13.9 %
MAGNESIUM SERPL-MCNC: 2 MG/DL (ref 1.6–2.4)
MCH RBC QN AUTO: 32.8 PG (ref 26–33)
MCHC RBC AUTO-ENTMCNC: 31.5 GM/DL (ref 32.2–35.5)
MCV RBC AUTO: 104.1 FL (ref 80–94)
MONOCYTES ABSOLUTE: 0.5 THOU/MM3 (ref 0.4–1.3)
MONOCYTES NFR BLD AUTO: 9.8 %
NEUTROPHILS NFR BLD AUTO: 73.2 %
NRBC BLD AUTO-RTO: 0 /100 WBC
NT-PROBNP SERPL IA-MCNC: ABNORMAL PG/ML (ref 0–124)
OSMOLALITY SERPL CALC.SUM OF ELEC: 287.1 MOSMOL/KG (ref 275–300)
PHOSPHATE SERPL-MCNC: 3.2 MG/DL (ref 2.4–4.7)
PLATELET # BLD AUTO: 140 THOU/MM3 (ref 130–400)
PMV BLD AUTO: 9.9 FL (ref 9.4–12.4)
POTASSIUM SERPL-SCNC: 4.2 MEQ/L (ref 3.5–5.2)
PROT SERPL-MCNC: 7.1 G/DL (ref 6.1–8)
RBC # BLD AUTO: 2.93 MILL/MM3 (ref 4.7–6.1)
RH FACTOR: NORMAL
SARS-COV-2 RNA RESP QL NAA+PROBE: NOT DETECTED
SEGMENTED NEUTROPHILS ABSOLUTE COUNT: 3.6 THOU/MM3 (ref 1.8–7.7)
SODIUM SERPL-SCNC: 141 MEQ/L (ref 135–145)
TROPONIN, HIGH SENSITIVITY: 101 NG/L (ref 0–12)
TROPONIN, HIGH SENSITIVITY: 93 NG/L (ref 0–12)
WBC # BLD AUTO: 4.9 THOU/MM3 (ref 4.8–10.8)

## 2024-02-03 PROCEDURE — 87636 SARSCOV2 & INF A&B AMP PRB: CPT

## 2024-02-03 PROCEDURE — 96365 THER/PROPH/DIAG IV INF INIT: CPT

## 2024-02-03 PROCEDURE — 85610 PROTHROMBIN TIME: CPT

## 2024-02-03 PROCEDURE — 80053 COMPREHEN METABOLIC PANEL: CPT

## 2024-02-03 PROCEDURE — 85730 THROMBOPLASTIN TIME PARTIAL: CPT

## 2024-02-03 PROCEDURE — 96375 TX/PRO/DX INJ NEW DRUG ADDON: CPT

## 2024-02-03 PROCEDURE — 36415 COLL VENOUS BLD VENIPUNCTURE: CPT

## 2024-02-03 PROCEDURE — 74177 CT ABD & PELVIS W/CONTRAST: CPT

## 2024-02-03 PROCEDURE — 96374 THER/PROPH/DIAG INJ IV PUSH: CPT

## 2024-02-03 PROCEDURE — 6360000002 HC RX W HCPCS: Performed by: STUDENT IN AN ORGANIZED HEALTH CARE EDUCATION/TRAINING PROGRAM

## 2024-02-03 PROCEDURE — 84145 PROCALCITONIN (PCT): CPT

## 2024-02-03 PROCEDURE — 83735 ASSAY OF MAGNESIUM: CPT

## 2024-02-03 PROCEDURE — 86900 BLOOD TYPING SEROLOGIC ABO: CPT

## 2024-02-03 PROCEDURE — 93005 ELECTROCARDIOGRAM TRACING: CPT | Performed by: STUDENT IN AN ORGANIZED HEALTH CARE EDUCATION/TRAINING PROGRAM

## 2024-02-03 PROCEDURE — 6360000004 HC RX CONTRAST MEDICATION: Performed by: STUDENT IN AN ORGANIZED HEALTH CARE EDUCATION/TRAINING PROGRAM

## 2024-02-03 PROCEDURE — 82330 ASSAY OF CALCIUM: CPT

## 2024-02-03 PROCEDURE — 84100 ASSAY OF PHOSPHORUS: CPT

## 2024-02-03 PROCEDURE — 83880 ASSAY OF NATRIURETIC PEPTIDE: CPT

## 2024-02-03 PROCEDURE — 99223 1ST HOSP IP/OBS HIGH 75: CPT

## 2024-02-03 PROCEDURE — G0378 HOSPITAL OBSERVATION PER HR: HCPCS

## 2024-02-03 PROCEDURE — 71275 CT ANGIOGRAPHY CHEST: CPT

## 2024-02-03 PROCEDURE — 86901 BLOOD TYPING SEROLOGIC RH(D): CPT

## 2024-02-03 PROCEDURE — 83605 ASSAY OF LACTIC ACID: CPT

## 2024-02-03 PROCEDURE — 84484 ASSAY OF TROPONIN QUANT: CPT

## 2024-02-03 PROCEDURE — 83690 ASSAY OF LIPASE: CPT

## 2024-02-03 PROCEDURE — 86850 RBC ANTIBODY SCREEN: CPT

## 2024-02-03 PROCEDURE — 99285 EMERGENCY DEPT VISIT HI MDM: CPT

## 2024-02-03 PROCEDURE — 85025 COMPLETE CBC W/AUTO DIFF WBC: CPT

## 2024-02-03 PROCEDURE — 74175 CTA ABDOMEN W/CONTRAST: CPT

## 2024-02-03 PROCEDURE — 6370000000 HC RX 637 (ALT 250 FOR IP): Performed by: STUDENT IN AN ORGANIZED HEALTH CARE EDUCATION/TRAINING PROGRAM

## 2024-02-03 RX ORDER — MORPHINE SULFATE 4 MG/ML
4 INJECTION, SOLUTION INTRAMUSCULAR; INTRAVENOUS
Status: ACTIVE | OUTPATIENT
Start: 2024-02-03 | End: 2024-02-04

## 2024-02-03 RX ORDER — ACETAMINOPHEN 650 MG/1
650 SUPPOSITORY RECTAL EVERY 6 HOURS PRN
Status: DISCONTINUED | OUTPATIENT
Start: 2024-02-03 | End: 2024-02-06 | Stop reason: HOSPADM

## 2024-02-03 RX ORDER — POLYETHYLENE GLYCOL 3350 17 G/17G
17 POWDER, FOR SOLUTION ORAL DAILY PRN
Status: DISCONTINUED | OUTPATIENT
Start: 2024-02-03 | End: 2024-02-06 | Stop reason: HOSPADM

## 2024-02-03 RX ORDER — SODIUM CHLORIDE 0.9 % (FLUSH) 0.9 %
5-40 SYRINGE (ML) INJECTION EVERY 12 HOURS SCHEDULED
Status: DISCONTINUED | OUTPATIENT
Start: 2024-02-03 | End: 2024-02-06 | Stop reason: HOSPADM

## 2024-02-03 RX ORDER — ONDANSETRON 2 MG/ML
4 INJECTION INTRAMUSCULAR; INTRAVENOUS ONCE
Status: COMPLETED | OUTPATIENT
Start: 2024-02-03 | End: 2024-02-03

## 2024-02-03 RX ORDER — ACETAMINOPHEN 500 MG
1000 TABLET ORAL ONCE
Status: COMPLETED | OUTPATIENT
Start: 2024-02-03 | End: 2024-02-03

## 2024-02-03 RX ORDER — ONDANSETRON 4 MG/1
4 TABLET, ORALLY DISINTEGRATING ORAL EVERY 8 HOURS PRN
Status: DISCONTINUED | OUTPATIENT
Start: 2024-02-03 | End: 2024-02-06 | Stop reason: HOSPADM

## 2024-02-03 RX ORDER — SODIUM CHLORIDE, SODIUM LACTATE, POTASSIUM CHLORIDE, AND CALCIUM CHLORIDE .6; .31; .03; .02 G/100ML; G/100ML; G/100ML; G/100ML
500 INJECTION, SOLUTION INTRAVENOUS ONCE
Status: COMPLETED | OUTPATIENT
Start: 2024-02-03 | End: 2024-02-04

## 2024-02-03 RX ORDER — SODIUM CHLORIDE 9 MG/ML
INJECTION, SOLUTION INTRAVENOUS PRN
Status: DISCONTINUED | OUTPATIENT
Start: 2024-02-03 | End: 2024-02-06 | Stop reason: HOSPADM

## 2024-02-03 RX ORDER — ONDANSETRON 2 MG/ML
4 INJECTION INTRAMUSCULAR; INTRAVENOUS EVERY 6 HOURS PRN
Status: DISCONTINUED | OUTPATIENT
Start: 2024-02-03 | End: 2024-02-06 | Stop reason: HOSPADM

## 2024-02-03 RX ORDER — ACETAMINOPHEN 325 MG/1
650 TABLET ORAL EVERY 6 HOURS PRN
Status: DISCONTINUED | OUTPATIENT
Start: 2024-02-03 | End: 2024-02-06 | Stop reason: HOSPADM

## 2024-02-03 RX ORDER — HEPARIN SODIUM 5000 [USP'U]/ML
5000 INJECTION, SOLUTION INTRAVENOUS; SUBCUTANEOUS EVERY 8 HOURS SCHEDULED
Status: DISCONTINUED | OUTPATIENT
Start: 2024-02-04 | End: 2024-02-06 | Stop reason: HOSPADM

## 2024-02-03 RX ORDER — SODIUM CHLORIDE 0.9 % (FLUSH) 0.9 %
5-40 SYRINGE (ML) INJECTION PRN
Status: DISCONTINUED | OUTPATIENT
Start: 2024-02-03 | End: 2024-02-06 | Stop reason: HOSPADM

## 2024-02-03 RX ORDER — BENZONATATE 100 MG/1
100 CAPSULE ORAL 3 TIMES DAILY PRN
Status: DISCONTINUED | OUTPATIENT
Start: 2024-02-03 | End: 2024-02-06 | Stop reason: HOSPADM

## 2024-02-03 RX ORDER — MAGNESIUM SULFATE IN WATER 40 MG/ML
2000 INJECTION, SOLUTION INTRAVENOUS ONCE
Status: COMPLETED | OUTPATIENT
Start: 2024-02-03 | End: 2024-02-03

## 2024-02-03 RX ADMIN — MAGNESIUM SULFATE HEPTAHYDRATE 2000 MG: 40 INJECTION, SOLUTION INTRAVENOUS at 21:00

## 2024-02-03 RX ADMIN — IOPAMIDOL 80 ML: 755 INJECTION, SOLUTION INTRAVENOUS at 19:49

## 2024-02-03 RX ADMIN — ACETAMINOPHEN 1000 MG: 500 TABLET ORAL at 19:28

## 2024-02-03 RX ADMIN — ONDANSETRON 4 MG: 2 INJECTION INTRAMUSCULAR; INTRAVENOUS at 19:26

## 2024-02-03 RX ADMIN — IOPAMIDOL 80 ML: 755 INJECTION, SOLUTION INTRAVENOUS at 20:07

## 2024-02-03 ASSESSMENT — PAIN - FUNCTIONAL ASSESSMENT: PAIN_FUNCTIONAL_ASSESSMENT: 0-10

## 2024-02-03 ASSESSMENT — PAIN SCALES - GENERAL: PAINLEVEL_OUTOF10: 6

## 2024-02-03 ASSESSMENT — PAIN DESCRIPTION - LOCATION: LOCATION: ABDOMEN;HEAD

## 2024-02-03 NOTE — ED PROVIDER NOTES
[facility]  EMERGENCY DEPARTMENT ENCOUNTER      Patient Name: Trav Jennings  MRN: [id number]  YOB: 1965  Date of Evaluation: 2/3/2024  Emergency Physician: Saeid Nation MD    CHIEF COMPLAINT       Chief Complaint   Patient presents with    Nasal Congestion    Headache       HISTORY OF PRESENT ILLNESS    HPI    History obtained from chart review and the patient.    Trav Jennings is a 59 y.o. male who presents to the emergency department from home as a walk in to the ED lobby for evaluation of 1 week of illness including nasal congestion, headache, nausea, decreased appetite, fevers.  Reports over the past 3 days he has had chest pain that is worse when laying down, shortness of breath that is also worse when laying down, nausea, no vomiting.  Reports he also has history of hemodialysis and follows with Dr. Ayon, he is on a Tuesday Thursday Saturday schedule and had hemodialysis earlier today.  He reports subjective fevers at home but does not know how high they have been, no known sick contacts.  He reports he did receive a seasonal influenza vaccine and did receive first 2 initial vaccines against COVID-19 but no vaccination since.  Also reports dizziness for the past 2 to 3 days worse today.    Pertinent previous and/or external records reviewed:  Patient also has a history of diabetes mellitus, abdominal aortic aneurysm    REVIEW OF SYSTEMS   Review of Systems  Negative unless documented in HPI    PAST MEDICAL AND SURGICAL HISTORY     Past Medical History:   Diagnosis Date    AAA (abdominal aortic aneurysm) ([facility])     NURIS (acute kidney injury) ([facility]) 9/24/2015    Anemia associated with chronic renal failure     Arthritis     stated in hands    Cocaine abuse ([facility]) 5/10/2014    Depression     Diabetes mellitus ([facility])     pt states he no longer has diabetes he has lost alot of davion.     FSGS (focal segmental glomerulosclerosis) 5/23/2013    Hemodialysis patient ([facility])

## 2024-02-04 ENCOUNTER — APPOINTMENT (OUTPATIENT)
Dept: CT IMAGING | Age: 59
End: 2024-02-04
Payer: MEDICARE

## 2024-02-04 ENCOUNTER — APPOINTMENT (OUTPATIENT)
Dept: ULTRASOUND IMAGING | Age: 59
End: 2024-02-04
Payer: MEDICARE

## 2024-02-04 PROBLEM — R22.1 SUBMANDIBULAR SWELLING: Status: ACTIVE | Noted: 2024-02-04

## 2024-02-04 PROBLEM — R22.0 SUBMANDIBULAR SWELLING: Status: ACTIVE | Noted: 2024-02-04

## 2024-02-04 PROBLEM — R10.13 ABDOMINAL PAIN, EPIGASTRIC: Status: ACTIVE | Noted: 2024-02-04

## 2024-02-04 PROBLEM — R11.0 NAUSEA: Status: ACTIVE | Noted: 2024-02-04

## 2024-02-04 LAB
ANION GAP SERPL CALC-SCNC: 14 MEQ/L (ref 8–16)
BASOPHILS ABSOLUTE: 0 THOU/MM3 (ref 0–0.1)
BASOPHILS NFR BLD AUTO: 0.4 %
BUN SERPL-MCNC: 36 MG/DL (ref 7–22)
CALCIUM SERPL-MCNC: 7.3 MG/DL (ref 8.5–10.5)
CHLORIDE SERPL-SCNC: 101 MEQ/L (ref 98–111)
CO2 SERPL-SCNC: 27 MEQ/L (ref 23–33)
CREAT SERPL-MCNC: 7.2 MG/DL (ref 0.4–1.2)
CRP SERPL-MCNC: 1.01 MG/DL (ref 0–1)
DEPRECATED RDW RBC AUTO: 50.8 FL (ref 35–45)
EKG ATRIAL RATE: 90 BPM
EKG ATRIAL RATE: 92 BPM
EKG P AXIS: 62 DEGREES
EKG P AXIS: 65 DEGREES
EKG P-R INTERVAL: 204 MS
EKG P-R INTERVAL: 210 MS
EKG Q-T INTERVAL: 408 MS
EKG Q-T INTERVAL: 428 MS
EKG QRS DURATION: 102 MS
EKG QRS DURATION: 114 MS
EKG QTC CALCULATION (BAZETT): 504 MS
EKG QTC CALCULATION (BAZETT): 523 MS
EKG R AXIS: -34 DEGREES
EKG R AXIS: -39 DEGREES
EKG T AXIS: 89 DEGREES
EKG T AXIS: 94 DEGREES
EKG VENTRICULAR RATE: 90 BPM
EKG VENTRICULAR RATE: 92 BPM
EOSINOPHIL NFR BLD AUTO: 2.4 %
EOSINOPHILS ABSOLUTE: 0.1 THOU/MM3 (ref 0–0.4)
ERYTHROCYTE [DISTWIDTH] IN BLOOD BY AUTOMATED COUNT: 13.1 % (ref 11.5–14.5)
ERYTHROCYTE [SEDIMENTATION RATE] IN BLOOD BY WESTERGREN METHOD: 22 MM/HR (ref 0–10)
GFR SERPL CREATININE-BSD FRML MDRD: 8 ML/MIN/1.73M2
GLUCOSE SERPL-MCNC: 73 MG/DL (ref 70–108)
HCT VFR BLD AUTO: 31.1 % (ref 42–52)
HGB BLD-MCNC: 9.4 GM/DL (ref 14–18)
IMM GRANULOCYTES # BLD AUTO: 0.03 THOU/MM3 (ref 0–0.07)
IMM GRANULOCYTES NFR BLD AUTO: 0.6 %
LYMPHOCYTES ABSOLUTE: 0.8 THOU/MM3 (ref 1–4.8)
LYMPHOCYTES NFR BLD AUTO: 16.9 %
MCH RBC QN AUTO: 32.1 PG (ref 26–33)
MCHC RBC AUTO-ENTMCNC: 30.2 GM/DL (ref 32.2–35.5)
MCV RBC AUTO: 106.1 FL (ref 80–94)
MONOCYTES ABSOLUTE: 0.5 THOU/MM3 (ref 0.4–1.3)
MONOCYTES NFR BLD AUTO: 11.9 %
NEUTROPHILS NFR BLD AUTO: 67.8 %
NRBC BLD AUTO-RTO: 0 /100 WBC
PLATELET # BLD AUTO: 150 THOU/MM3 (ref 130–400)
PMV BLD AUTO: 10.1 FL (ref 9.4–12.4)
POTASSIUM SERPL-SCNC: 4.5 MEQ/L (ref 3.5–5.2)
PROCALCITONIN SERPL IA-MCNC: 0.8 NG/ML (ref 0.01–0.09)
RBC # BLD AUTO: 2.93 MILL/MM3 (ref 4.7–6.1)
SEGMENTED NEUTROPHILS ABSOLUTE COUNT: 3.1 THOU/MM3 (ref 1.8–7.7)
SODIUM SERPL-SCNC: 142 MEQ/L (ref 135–145)
WBC # BLD AUTO: 4.6 THOU/MM3 (ref 4.8–10.8)

## 2024-02-04 PROCEDURE — 96361 HYDRATE IV INFUSION ADD-ON: CPT

## 2024-02-04 PROCEDURE — 87040 BLOOD CULTURE FOR BACTERIA: CPT

## 2024-02-04 PROCEDURE — 86665 EPSTEIN-BARR CAPSID VCA: CPT

## 2024-02-04 PROCEDURE — 96376 TX/PRO/DX INJ SAME DRUG ADON: CPT

## 2024-02-04 PROCEDURE — 85025 COMPLETE CBC W/AUTO DIFF WBC: CPT

## 2024-02-04 PROCEDURE — 96375 TX/PRO/DX INJ NEW DRUG ADDON: CPT

## 2024-02-04 PROCEDURE — 6360000002 HC RX W HCPCS

## 2024-02-04 PROCEDURE — 85651 RBC SED RATE NONAUTOMATED: CPT

## 2024-02-04 PROCEDURE — 86140 C-REACTIVE PROTEIN: CPT

## 2024-02-04 PROCEDURE — 2580000003 HC RX 258: Performed by: PHYSICIAN ASSISTANT

## 2024-02-04 PROCEDURE — 93005 ELECTROCARDIOGRAM TRACING: CPT | Performed by: STUDENT IN AN ORGANIZED HEALTH CARE EDUCATION/TRAINING PROGRAM

## 2024-02-04 PROCEDURE — 6360000004 HC RX CONTRAST MEDICATION: Performed by: PHYSICIAN ASSISTANT

## 2024-02-04 PROCEDURE — 86735 MUMPS ANTIBODY: CPT

## 2024-02-04 PROCEDURE — 93010 ELECTROCARDIOGRAM REPORT: CPT | Performed by: INTERNAL MEDICINE

## 2024-02-04 PROCEDURE — 99222 1ST HOSP IP/OBS MODERATE 55: CPT | Performed by: INTERNAL MEDICINE

## 2024-02-04 PROCEDURE — 76705 ECHO EXAM OF ABDOMEN: CPT

## 2024-02-04 PROCEDURE — 80048 BASIC METABOLIC PNL TOTAL CA: CPT

## 2024-02-04 PROCEDURE — A4216 STERILE WATER/SALINE, 10 ML: HCPCS | Performed by: PHYSICIAN ASSISTANT

## 2024-02-04 PROCEDURE — 6370000000 HC RX 637 (ALT 250 FOR IP)

## 2024-02-04 PROCEDURE — 2580000003 HC RX 258: Performed by: STUDENT IN AN ORGANIZED HEALTH CARE EDUCATION/TRAINING PROGRAM

## 2024-02-04 PROCEDURE — 94640 AIRWAY INHALATION TREATMENT: CPT

## 2024-02-04 PROCEDURE — 99233 SBSQ HOSP IP/OBS HIGH 50: CPT | Performed by: PHYSICIAN ASSISTANT

## 2024-02-04 PROCEDURE — 2580000003 HC RX 258

## 2024-02-04 PROCEDURE — 2500000003 HC RX 250 WO HCPCS: Performed by: PHYSICIAN ASSISTANT

## 2024-02-04 PROCEDURE — 6360000002 HC RX W HCPCS: Performed by: PHYSICIAN ASSISTANT

## 2024-02-04 PROCEDURE — 99222 1ST HOSP IP/OBS MODERATE 55: CPT | Performed by: PHYSICIAN ASSISTANT

## 2024-02-04 PROCEDURE — G0378 HOSPITAL OBSERVATION PER HR: HCPCS

## 2024-02-04 PROCEDURE — 2700000000 HC OXYGEN THERAPY PER DAY

## 2024-02-04 PROCEDURE — 94761 N-INVAS EAR/PLS OXIMETRY MLT: CPT

## 2024-02-04 PROCEDURE — 70491 CT SOFT TISSUE NECK W/DYE: CPT

## 2024-02-04 PROCEDURE — 36415 COLL VENOUS BLD VENIPUNCTURE: CPT

## 2024-02-04 PROCEDURE — 96372 THER/PROPH/DIAG INJ SC/IM: CPT

## 2024-02-04 PROCEDURE — 86664 EPSTEIN-BARR NUCLEAR ANTIGEN: CPT

## 2024-02-04 PROCEDURE — 99205 OFFICE O/P NEW HI 60 MIN: CPT | Performed by: SURGERY

## 2024-02-04 PROCEDURE — 86663 EPSTEIN-BARR ANTIBODY: CPT

## 2024-02-04 PROCEDURE — 6370000000 HC RX 637 (ALT 250 FOR IP): Performed by: PHYSICIAN ASSISTANT

## 2024-02-04 RX ORDER — CINACALCET 30 MG/1
150 TABLET, FILM COATED ORAL
Status: DISCONTINUED | OUTPATIENT
Start: 2024-02-05 | End: 2024-02-05 | Stop reason: ALTCHOICE

## 2024-02-04 RX ORDER — TRAZODONE HYDROCHLORIDE 50 MG/1
50 TABLET ORAL NIGHTLY PRN
Status: DISCONTINUED | OUTPATIENT
Start: 2024-02-04 | End: 2024-02-06 | Stop reason: HOSPADM

## 2024-02-04 RX ORDER — FOLIC ACID 1 MG/1
1 TABLET ORAL DAILY
Status: DISCONTINUED | OUTPATIENT
Start: 2024-02-04 | End: 2024-02-06 | Stop reason: HOSPADM

## 2024-02-04 RX ORDER — FAMOTIDINE 20 MG/1
20 TABLET, FILM COATED ORAL DAILY
Status: DISCONTINUED | OUTPATIENT
Start: 2024-02-04 | End: 2024-02-04

## 2024-02-04 RX ORDER — ISOSORBIDE MONONITRATE 60 MG/1
120 TABLET, EXTENDED RELEASE ORAL 2 TIMES DAILY
Status: DISCONTINUED | OUTPATIENT
Start: 2024-02-04 | End: 2024-02-06 | Stop reason: HOSPADM

## 2024-02-04 RX ORDER — ASPIRIN 81 MG/1
81 TABLET ORAL DAILY
Status: DISCONTINUED | OUTPATIENT
Start: 2024-02-04 | End: 2024-02-06 | Stop reason: HOSPADM

## 2024-02-04 RX ORDER — SERTRALINE HYDROCHLORIDE 100 MG/1
100 TABLET, FILM COATED ORAL DAILY
Status: DISCONTINUED | OUTPATIENT
Start: 2024-02-04 | End: 2024-02-06 | Stop reason: HOSPADM

## 2024-02-04 RX ORDER — FAMOTIDINE 20 MG/1
20 TABLET, FILM COATED ORAL 2 TIMES DAILY
Status: DISCONTINUED | OUTPATIENT
Start: 2024-02-04 | End: 2024-02-06 | Stop reason: HOSPADM

## 2024-02-04 RX ORDER — FERROUS SULFATE 325(65) MG
325 TABLET ORAL
Status: DISCONTINUED | OUTPATIENT
Start: 2024-02-04 | End: 2024-02-06 | Stop reason: HOSPADM

## 2024-02-04 RX ORDER — FOLIC ACID/VIT B COMPLEX AND C 5 MG
1 TABLET ORAL DAILY
Status: DISCONTINUED | OUTPATIENT
Start: 2024-02-04 | End: 2024-02-06 | Stop reason: HOSPADM

## 2024-02-04 RX ORDER — HYDRALAZINE HYDROCHLORIDE 20 MG/ML
10 INJECTION INTRAMUSCULAR; INTRAVENOUS ONCE
Status: COMPLETED | OUTPATIENT
Start: 2024-02-04 | End: 2024-02-04

## 2024-02-04 RX ORDER — M-VIT,TX,IRON,MINS/CALC/FOLIC 27MG-0.4MG
1 TABLET ORAL DAILY
Status: DISCONTINUED | OUTPATIENT
Start: 2024-02-04 | End: 2024-02-04 | Stop reason: SDUPTHER

## 2024-02-04 RX ORDER — ATORVASTATIN CALCIUM 40 MG/1
40 TABLET, FILM COATED ORAL NIGHTLY
Status: DISCONTINUED | OUTPATIENT
Start: 2024-02-04 | End: 2024-02-06 | Stop reason: HOSPADM

## 2024-02-04 RX ORDER — DIPHENHYDRAMINE HYDROCHLORIDE 50 MG/ML
25 INJECTION INTRAMUSCULAR; INTRAVENOUS ONCE
Status: COMPLETED | OUTPATIENT
Start: 2024-02-04 | End: 2024-02-04

## 2024-02-04 RX ORDER — VITAMIN B COMPLEX
1000 TABLET ORAL 2 TIMES DAILY
Status: DISCONTINUED | OUTPATIENT
Start: 2024-02-04 | End: 2024-02-06 | Stop reason: HOSPADM

## 2024-02-04 RX ORDER — HYDRALAZINE HYDROCHLORIDE 50 MG/1
50 TABLET, FILM COATED ORAL EVERY 8 HOURS
Status: DISCONTINUED | OUTPATIENT
Start: 2024-02-04 | End: 2024-02-05

## 2024-02-04 RX ORDER — ALBUTEROL SULFATE 2.5 MG/3ML
2.5 SOLUTION RESPIRATORY (INHALATION) EVERY 6 HOURS PRN
Status: DISCONTINUED | OUTPATIENT
Start: 2024-02-04 | End: 2024-02-06 | Stop reason: HOSPADM

## 2024-02-04 RX ORDER — CALCITRIOL 0.25 UG/1
0.5 CAPSULE, LIQUID FILLED ORAL
Status: DISCONTINUED | OUTPATIENT
Start: 2024-02-05 | End: 2024-02-05 | Stop reason: ALTCHOICE

## 2024-02-04 RX ORDER — VITAMIN E 268 MG
400 CAPSULE ORAL DAILY
Status: DISCONTINUED | OUTPATIENT
Start: 2024-02-04 | End: 2024-02-06 | Stop reason: HOSPADM

## 2024-02-04 RX ORDER — DEXAMETHASONE SODIUM PHOSPHATE 4 MG/ML
8 INJECTION, SOLUTION INTRA-ARTICULAR; INTRALESIONAL; INTRAMUSCULAR; INTRAVENOUS; SOFT TISSUE EVERY 8 HOURS
Status: COMPLETED | OUTPATIENT
Start: 2024-02-04 | End: 2024-02-05

## 2024-02-04 RX ORDER — VERAPAMIL HYDROCHLORIDE 240 MG/1
240 TABLET, FILM COATED, EXTENDED RELEASE ORAL DAILY
Status: DISCONTINUED | OUTPATIENT
Start: 2024-02-04 | End: 2024-02-06 | Stop reason: HOSPADM

## 2024-02-04 RX ORDER — SEVELAMER CARBONATE 800 MG/1
1600 TABLET, FILM COATED ORAL
Status: DISCONTINUED | OUTPATIENT
Start: 2024-02-04 | End: 2024-02-06 | Stop reason: HOSPADM

## 2024-02-04 RX ORDER — GUAIFENESIN/DEXTROMETHORPHAN 100-10MG/5
5 SYRUP ORAL EVERY 4 HOURS PRN
Status: DISCONTINUED | OUTPATIENT
Start: 2024-02-04 | End: 2024-02-06 | Stop reason: HOSPADM

## 2024-02-04 RX ADMIN — Medication 1000 UNITS: at 12:35

## 2024-02-04 RX ADMIN — SEVELAMER CARBONATE 1600 MG: 800 TABLET, FILM COATED ORAL at 12:35

## 2024-02-04 RX ADMIN — DEXAMETHASONE SODIUM PHOSPHATE 8 MG: 4 INJECTION, SOLUTION INTRA-ARTICULAR; INTRALESIONAL; INTRAMUSCULAR; INTRAVENOUS; SOFT TISSUE at 20:10

## 2024-02-04 RX ADMIN — SERTRALINE 100 MG: 100 TABLET, FILM COATED ORAL at 12:35

## 2024-02-04 RX ADMIN — BENZONATATE 100 MG: 100 CAPSULE ORAL at 16:38

## 2024-02-04 RX ADMIN — FAMOTIDINE 20 MG: 20 TABLET, FILM COATED ORAL at 12:35

## 2024-02-04 RX ADMIN — HEPARIN SODIUM 5000 UNITS: 5000 INJECTION INTRAVENOUS; SUBCUTANEOUS at 14:10

## 2024-02-04 RX ADMIN — IOPAMIDOL 80 ML: 755 INJECTION, SOLUTION INTRAVENOUS at 10:48

## 2024-02-04 RX ADMIN — HYDRALAZINE HYDROCHLORIDE 10 MG: 20 INJECTION, SOLUTION INTRAMUSCULAR; INTRAVENOUS at 01:41

## 2024-02-04 RX ADMIN — Medication 400 UNITS: at 12:34

## 2024-02-04 RX ADMIN — ASPIRIN 81 MG: 81 TABLET, COATED ORAL at 12:35

## 2024-02-04 RX ADMIN — HYDRALAZINE HYDROCHLORIDE 50 MG: 50 TABLET ORAL at 16:38

## 2024-02-04 RX ADMIN — HEPARIN SODIUM 5000 UNITS: 5000 INJECTION INTRAVENOUS; SUBCUTANEOUS at 22:08

## 2024-02-04 RX ADMIN — Medication 1000 UNITS: at 20:10

## 2024-02-04 RX ADMIN — Medication 1 TABLET: at 12:35

## 2024-02-04 RX ADMIN — FAMOTIDINE 20 MG: 20 TABLET, FILM COATED ORAL at 20:11

## 2024-02-04 RX ADMIN — SODIUM CHLORIDE, POTASSIUM CHLORIDE, SODIUM LACTATE AND CALCIUM CHLORIDE 500 ML: 600; 310; 30; 20 INJECTION, SOLUTION INTRAVENOUS at 00:31

## 2024-02-04 RX ADMIN — FERROUS SULFATE TAB 325 MG (65 MG ELEMENTAL FE) 325 MG: 325 (65 FE) TAB at 12:35

## 2024-02-04 RX ADMIN — SODIUM CHLORIDE, PRESERVATIVE FREE 10 ML: 5 INJECTION INTRAVENOUS at 00:30

## 2024-02-04 RX ADMIN — CLONIDINE HYDROCHLORIDE 0.3 MG: 0.2 TABLET ORAL at 12:35

## 2024-02-04 RX ADMIN — TRAZODONE HYDROCHLORIDE 50 MG: 50 TABLET ORAL at 20:10

## 2024-02-04 RX ADMIN — SEVELAMER CARBONATE 1600 MG: 800 TABLET, FILM COATED ORAL at 16:38

## 2024-02-04 RX ADMIN — SODIUM CHLORIDE, PRESERVATIVE FREE 10 ML: 5 INJECTION INTRAVENOUS at 20:10

## 2024-02-04 RX ADMIN — ISOSORBIDE MONONITRATE 120 MG: 60 TABLET, EXTENDED RELEASE ORAL at 12:35

## 2024-02-04 RX ADMIN — CLONIDINE HYDROCHLORIDE 0.3 MG: 0.2 TABLET ORAL at 20:10

## 2024-02-04 RX ADMIN — DIPHENHYDRAMINE HYDROCHLORIDE 25 MG: 50 INJECTION INTRAMUSCULAR; INTRAVENOUS at 10:11

## 2024-02-04 RX ADMIN — FOLIC ACID 1 MG: 1 TABLET ORAL at 12:35

## 2024-02-04 RX ADMIN — SODIUM CHLORIDE, PRESERVATIVE FREE 10 ML: 5 INJECTION INTRAVENOUS at 12:36

## 2024-02-04 RX ADMIN — ISOSORBIDE MONONITRATE 120 MG: 60 TABLET, EXTENDED RELEASE ORAL at 20:10

## 2024-02-04 RX ADMIN — GUAIFENESIN AND DEXTROMETHORPHAN 5 ML: 100; 10 SYRUP ORAL at 22:08

## 2024-02-04 RX ADMIN — HYDRALAZINE HYDROCHLORIDE 50 MG: 50 TABLET ORAL at 12:37

## 2024-02-04 RX ADMIN — ALBUTEROL SULFATE 2.5 MG: 2.5 SOLUTION RESPIRATORY (INHALATION) at 20:47

## 2024-02-04 RX ADMIN — BENZONATATE 100 MG: 100 CAPSULE ORAL at 05:44

## 2024-02-04 RX ADMIN — FERROUS SULFATE TAB 325 MG (65 MG ELEMENTAL FE) 325 MG: 325 (65 FE) TAB at 16:38

## 2024-02-04 RX ADMIN — DEXAMETHASONE SODIUM PHOSPHATE 8 MG: 4 INJECTION, SOLUTION INTRA-ARTICULAR; INTRALESIONAL; INTRAMUSCULAR; INTRAVENOUS; SOFT TISSUE at 14:10

## 2024-02-04 RX ADMIN — FAMOTIDINE 20 MG: 10 INJECTION, SOLUTION INTRAVENOUS at 10:10

## 2024-02-04 RX ADMIN — VERAPAMIL HYDROCHLORIDE 240 MG: 240 TABLET, FILM COATED, EXTENDED RELEASE ORAL at 12:35

## 2024-02-04 NOTE — CONSULTS
CT/CV Surgery Consult Note    2024 7:23 AM  Surgeon:   Dr. Nba Kwan      Reason for Consult:     HPI:    Mr. Jennings  is a 59 year old M with hx of aortic dissection, AAA, end-stage renal disease who dialyzes with a left aneurysmal brachiocephalic AV fistula.  Also has a history of cocaine use, diabetes, hypertension hyperlipidemia.  At home he is on breathing treatments, aspirin, statin and antihypertensives.  He tells me back in  he had his aortic dissection fixed at [facility].  This would appear to be via TEVAR with coiling of the left subclavian artery followed by left carotid subclavian bypass.  He has had previous scans here in the 2022.  He presented with chest tightness, cough and feeling ill for the past week or so.  He underwent CTA of the chest abdomen pelvis.  There has been no significant change compared to previous scans done in the 2022.  He does not follow with his doctor regularly at [facility].  Vascular surgery was consulted.    Of note the hospitalist note says vascular surgery was attempted to be reached twice from the ER yesterday evening.  I received no calls personally.    Vital Signs: BP (!) 175/83   Pulse 88   Temp 98.1 °F (36.7 °C) (Oral)   Resp 20   Ht 1.905 m (6' 3\")   Wt 92.3 kg (203 lb 7.8 oz)   SpO2 100%   BMI 25.43 kg/m²    Temp (24hrs), Av.4 °F (36.9 °C), Min:98.1 °F (36.7 °C), Max:98.8 °F (37.1 °C)      Labs:   CBC:  Recent Labs     24  1851 24  1853 24  0533   WBC 4.9  --  4.6*   HGB 9.6*  --  9.4*   HCT 30.5*  --  31.1*   .1*  --  106.1*     --  150   APTT  --  32.4  --    INR  --  1.02  --      BMP:   Recent Labs     24  1851      K 4.2      CO2 28   PHOS 3.2   BUN 28*   CREATININE 5.9*   MG 2.0     Last HgA1C:   Lab Results   Component Value Date    LABA1C 5.5 2023       Imaging:  CXR: I have reviewed the CXR image.   CT chest w/o IV contrast: I have reviewed 
Department of Otolaryngology  Consult Note    Reason for Consult:  Sudden neck swelling  Requesting Physician:  Ban Tovar PA-C    CHIEF COMPLAINT:  Neck swelling    History Obtained From:  patient, electronic medical record    HISTORY OF PRESENT ILLNESS:                The patient is a 59 y.o. male who originally presented to [facility] ER yesterday evening for evaluation of illness for 1 week with nasal congestion, headache, nausea, decreased appetite, and fevers.  He has a history of CKD on dialysis Tuesday Thursday Saturday.  Patient was admitted due to concern for dissection flap of the aorta as well as assessment/management of right upper quadrant/epigastric pain and URI.  Patient was sitting in the hospital bed earlier this morning when he suddenly began to send some swelling in the left side of his neck.  Nursing staff was contacted and they came in to assess and noted a significant amount of swelling of the left neck and nursing reports that she could physically see the right neck gradually swelling.  Hospitalist was consulted who returned to examine the patient.  The swelling was not present on her initial assessment about 30 minutes prior, but had noticeably changed when she arrived.  Stat CT neck was ordered which revealed diffuse edematous changes of the neck, including the submandibular glands bilaterally.  Radiology interpretation appeared most consistent with angioedema, but infection also considered. Patient started on benadryl and pepcid with ENT consultation. Upon my arrival the patient reports that the gradual swelling has seemed to jeanine, or even slightly improved compared to when the swelling initially presented. He reports it is mildly tender to palpation with palpation of the swollen areas.  He reports some mild tenderness with swallowing as well.  He reports that his neck feels tight, but is not having difficulty breathing. He had not received any new medications/foods around the time symptoms 
5/25/23   Jimmy Castillo DO   calcitRIOL (ROCALTROL) 0.5 MCG capsule Take 1 capsule by mouth three times a week 5/26/23   Jimmy Castillo DO   cinacalcet (SENSIPAR) 30 MG tablet Take 5 tablets by mouth three times a week 5/26/23   Jimmy Castillo DO   Sucroferric Oxyhydroxide (VELPHORO) 500 MG CHEW Take 1 tablet by mouth 4 times daily (before meals and nightly) Take with meals and snacks    Jaime Young MD   Multiple Vitamins-Minerals (THERAPEUTIC MULTIVITAMIN-MINERALS) tablet Take 1 tablet by mouth daily    Jaime Young MD   vitamin E 400 UNIT capsule Take 1 capsule by mouth daily    Jaime Young MD   B Complex-C-Zn-Folic Acid (DIALYVITE 800-ZINC 15 PO) Take 1 tablet by mouth daily    Jaime Young MD   vitamin D (CHOLECALCIFEROL) 25 MCG (1000 UT) TABS tablet Take 1 tablet by mouth 2 times daily Take two tablets twice a day    Jaime Young MD   famotidine (PEPCID) 20 MG tablet Take 1 tablet by mouth daily 1/28/20   Jeanna Berumen APRN - CNP   sertraline (ZOLOFT) 100 MG tablet Take 1 tablet by mouth daily    Jaime Young MD   aspirin 81 MG EC tablet Take 1 tablet by mouth daily 3/23/18   Jules Alvarez MD   traZODone (DESYREL) 50 MG tablet Take 1 tablet by mouth nightly as needed for Sleep 3/23/18   Jules Alvarez MD       Review of Systems:  Constitutional: Positive for generalized weakness, fatigue, body aches, neck swelling, poor appetite, nausea  Head: Negative for headaches  Eyes: Negative for blurry vision or discharge  Ears: Negative for ear pain or hearing changes  Nose: Negative for runny nose or epistaxis  Respiratory:  Negative for hemoptysis  Cardiovascular: Negative for chest pain  GI: Negative for hematochezia and melena  Skin: Negative for rash  Musculoskeletal: Negative for joint pain, moves all ext  Neuro: Negative for numbness or tingling, negative for slurred speech  Psychiatric: Reports stable mood, negative for depression or insomnia    All

## 2024-02-04 NOTE — ED PROVIDER NOTES
I performed a history and physical examination of the patient and discussed management with the resident. I reviewed the resident’s note and agree with the documented findings and plan of care. Any areas of disagreement are noted on the chart. I was personally present for the key portions of any procedures. I have documented in the chart those procedures where I was not present during the key portions. I have reviewed the emergency nurses triage note. I agree with the chief complaint, past medical history, past surgical history, allergies, medications, social and family history as documented unless otherwise noted below. Documentation of the HPI, Physical Exam and Medical Decision Making performed by medical students or scribes is based on my personal performance of the HPI, PE and MDM. For Phys Assistant/ Nurse Practitioner cases/documentation I have personally evaluated this patient and have completed at least one if not all key elements of the E/M (history, physical exam, and MDM). My findings are as noted below.    In other words, I personally saw and examined the patient I have reviewed and agreed with the resident findings including all diagnostic interpretations and treatment plans as written.  I was present for the key portion of any procedures performed and the inclusive time noted in any critical care statement.    Patient presents today for nasal congestion nausea and headache.  Here today the patient appears to be ill.  Patient is well-known to me.  He is on dialysis.  I had a long talk with him at bedside.  He has some chest congestion without any kim pain.  He has some mild shortness of breath.  States he has been going to dialysis as recommended.  Patient states he has been having about a month of ongoing abdominal pain.  Patient states that it is dull achy in the background at times and then has been sharp and shooting it periodically.  Patient just went to dialysis today and decided to come to the

## 2024-02-04 NOTE — ED NOTES
Pt medicated per MAR and updated on current poc and expresses understanding. No other needs expressed at this time.

## 2024-02-04 NOTE — H&P
dose modulation, iterative reconstruction, and/or weight-based dosing when appropriate to reduce radiation dose to as low as reasonably achievable. FINDINGS: There is a thoracic aortic stent graft demonstrated along the descending thoracic aorta. This appears patent. There are calcifications demonstrated within the aneurysm sac external to the stent graft which appear unchanged from 9/8/2023. There is a chronic dissection demonstrated within the upper abdominal aorta which is reported separately on CT of the abdomen and pelvis. The central airways appear patent. There are patchy confluent opacities demonstrated at the right lung base with adjacent pleural thickening which appears similar to prior examination from 9/8/2023 likely representing scarring. There is no CT angiographic evidence of pulmonary embolism. There is no axillary, hilar or mediastinal lymphadenopathy. Limited evaluation of the upper abdomen appears unremarkable. No acute osseous findings are seen.     1. There is a thoracic aortic stent graft demonstrated along the descending thoracic aorta. This appears patent. There are calcifications demonstrated within the aneurysm sac external to the stent graft which appear unchanged from 9/8/2023. 2. There is a chronic dissection flap demonstrated within the upper abdominal aorta which is reported separately on CT of the abdomen and pelvis. 3. There are patchy confluent opacities demonstrated at the right lung base with adjacent pleural thickening which appears similar to prior examination from 9/8/2023 likely representing scarring. 4. There is no CT angiographic evidence of pulmonary embolism. **This report has been created using voice recognition software.  It may contain minor errors which are inherent in voice recognition technology.**  Final report electronically signed by Dr. Cecilio Lee on 2/3/2024 9:08 PM        Tele:   [x] yes             [] no      Thank you Radha Hou, APRN - CNP for the 
English

## 2024-02-05 LAB
ANION GAP SERPL CALC-SCNC: 13 MEQ/L (ref 8–16)
ANION GAP SERPL CALC-SCNC: 15 MEQ/L (ref 8–16)
BUN SERPL-MCNC: 49 MG/DL (ref 7–22)
BUN SERPL-MCNC: 50 MG/DL (ref 7–22)
CALCIUM SERPL-MCNC: 7.8 MG/DL (ref 8.5–10.5)
CALCIUM SERPL-MCNC: 7.8 MG/DL (ref 8.5–10.5)
CHLORIDE SERPL-SCNC: 96 MEQ/L (ref 98–111)
CHLORIDE SERPL-SCNC: 96 MEQ/L (ref 98–111)
CO2 SERPL-SCNC: 22 MEQ/L (ref 23–33)
CO2 SERPL-SCNC: 25 MEQ/L (ref 23–33)
CREAT SERPL-MCNC: 8.4 MG/DL (ref 0.4–1.2)
CREAT SERPL-MCNC: 8.7 MG/DL (ref 0.4–1.2)
DEPRECATED RDW RBC AUTO: 51.2 FL (ref 35–45)
ERYTHROCYTE [DISTWIDTH] IN BLOOD BY AUTOMATED COUNT: 13.1 % (ref 11.5–14.5)
GFR SERPL CREATININE-BSD FRML MDRD: 7 ML/MIN/1.73M2
GFR SERPL CREATININE-BSD FRML MDRD: 7 ML/MIN/1.73M2
GLUCOSE SERPL-MCNC: 145 MG/DL (ref 70–108)
GLUCOSE SERPL-MCNC: 229 MG/DL (ref 70–108)
HCT VFR BLD AUTO: 29.1 % (ref 42–52)
HGB BLD-MCNC: 8.8 GM/DL (ref 14–18)
MCH RBC QN AUTO: 32.4 PG (ref 26–33)
MCHC RBC AUTO-ENTMCNC: 30.2 GM/DL (ref 32.2–35.5)
MCV RBC AUTO: 107 FL (ref 80–94)
PLATELET # BLD AUTO: 177 THOU/MM3 (ref 130–400)
PMV BLD AUTO: 9.9 FL (ref 9.4–12.4)
POTASSIUM SERPL-SCNC: 4.9 MEQ/L (ref 3.5–5.2)
POTASSIUM SERPL-SCNC: 4.9 MEQ/L (ref 3.5–5.2)
RBC # BLD AUTO: 2.72 MILL/MM3 (ref 4.7–6.1)
SODIUM SERPL-SCNC: 133 MEQ/L (ref 135–145)
SODIUM SERPL-SCNC: 134 MEQ/L (ref 135–145)
WBC # BLD AUTO: 5.5 THOU/MM3 (ref 4.8–10.8)

## 2024-02-05 PROCEDURE — 6370000000 HC RX 637 (ALT 250 FOR IP): Performed by: INTERNAL MEDICINE

## 2024-02-05 PROCEDURE — 96372 THER/PROPH/DIAG INJ SC/IM: CPT

## 2024-02-05 PROCEDURE — 6360000002 HC RX W HCPCS: Performed by: PHYSICIAN ASSISTANT

## 2024-02-05 PROCEDURE — 2580000003 HC RX 258

## 2024-02-05 PROCEDURE — 99232 SBSQ HOSP IP/OBS MODERATE 35: CPT | Performed by: INTERNAL MEDICINE

## 2024-02-05 PROCEDURE — 6370000000 HC RX 637 (ALT 250 FOR IP)

## 2024-02-05 PROCEDURE — 96376 TX/PRO/DX INJ SAME DRUG ADON: CPT

## 2024-02-05 PROCEDURE — 99231 SBSQ HOSP IP/OBS SF/LOW 25: CPT | Performed by: REGISTERED NURSE

## 2024-02-05 PROCEDURE — 99232 SBSQ HOSP IP/OBS MODERATE 35: CPT | Performed by: PHYSICIAN ASSISTANT

## 2024-02-05 PROCEDURE — 85027 COMPLETE CBC AUTOMATED: CPT

## 2024-02-05 PROCEDURE — 6370000000 HC RX 637 (ALT 250 FOR IP): Performed by: PHYSICIAN ASSISTANT

## 2024-02-05 PROCEDURE — 6360000002 HC RX W HCPCS

## 2024-02-05 PROCEDURE — 80048 BASIC METABOLIC PNL TOTAL CA: CPT

## 2024-02-05 PROCEDURE — 36415 COLL VENOUS BLD VENIPUNCTURE: CPT

## 2024-02-05 PROCEDURE — G0378 HOSPITAL OBSERVATION PER HR: HCPCS

## 2024-02-05 RX ORDER — CALCITRIOL 0.25 UG/1
0.5 CAPSULE, LIQUID FILLED ORAL
Status: DISCONTINUED | OUTPATIENT
Start: 2024-02-06 | End: 2024-02-06 | Stop reason: HOSPADM

## 2024-02-05 RX ORDER — HYDROCODONE POLISTIREX AND CHLORPHENIRAMINE POLISTIREX 10; 8 MG/5ML; MG/5ML
5 SUSPENSION, EXTENDED RELEASE ORAL EVERY 12 HOURS PRN
Status: DISCONTINUED | OUTPATIENT
Start: 2024-02-05 | End: 2024-02-06 | Stop reason: HOSPADM

## 2024-02-05 RX ORDER — HYDRALAZINE HYDROCHLORIDE 50 MG/1
100 TABLET, FILM COATED ORAL EVERY 8 HOURS
Status: DISCONTINUED | OUTPATIENT
Start: 2024-02-05 | End: 2024-02-06 | Stop reason: HOSPADM

## 2024-02-05 RX ORDER — CINACALCET 30 MG/1
150 TABLET, FILM COATED ORAL
Status: DISCONTINUED | OUTPATIENT
Start: 2024-02-06 | End: 2024-02-06 | Stop reason: HOSPADM

## 2024-02-05 RX ADMIN — HYDRALAZINE HYDROCHLORIDE 50 MG: 50 TABLET ORAL at 09:58

## 2024-02-05 RX ADMIN — HEPARIN SODIUM 5000 UNITS: 5000 INJECTION INTRAVENOUS; SUBCUTANEOUS at 15:00

## 2024-02-05 RX ADMIN — SERTRALINE 100 MG: 100 TABLET, FILM COATED ORAL at 09:56

## 2024-02-05 RX ADMIN — Medication 1000 UNITS: at 20:32

## 2024-02-05 RX ADMIN — Medication 1 TABLET: at 12:43

## 2024-02-05 RX ADMIN — GUAIFENESIN AND DEXTROMETHORPHAN 5 ML: 100; 10 SYRUP ORAL at 04:04

## 2024-02-05 RX ADMIN — DEXAMETHASONE SODIUM PHOSPHATE 8 MG: 4 INJECTION, SOLUTION INTRA-ARTICULAR; INTRALESIONAL; INTRAMUSCULAR; INTRAVENOUS; SOFT TISSUE at 04:06

## 2024-02-05 RX ADMIN — SODIUM CHLORIDE, PRESERVATIVE FREE 10 ML: 5 INJECTION INTRAVENOUS at 20:34

## 2024-02-05 RX ADMIN — BENZOCAINE 6 MG-MENTHOL 10 MG LOZENGES 1 LOZENGE: at 18:34

## 2024-02-05 RX ADMIN — VERAPAMIL HYDROCHLORIDE 240 MG: 240 TABLET, FILM COATED, EXTENDED RELEASE ORAL at 10:03

## 2024-02-05 RX ADMIN — FOLIC ACID 1 MG: 1 TABLET ORAL at 09:58

## 2024-02-05 RX ADMIN — SEVELAMER CARBONATE 1600 MG: 800 TABLET, FILM COATED ORAL at 12:43

## 2024-02-05 RX ADMIN — CLONIDINE HYDROCHLORIDE 0.3 MG: 0.2 TABLET ORAL at 09:58

## 2024-02-05 RX ADMIN — BENZONATATE 100 MG: 100 CAPSULE ORAL at 04:04

## 2024-02-05 RX ADMIN — Medication 5 ML: at 20:42

## 2024-02-05 RX ADMIN — ACETAMINOPHEN 650 MG: 325 TABLET ORAL at 15:39

## 2024-02-05 RX ADMIN — CLONIDINE HYDROCHLORIDE 0.3 MG: 0.2 TABLET ORAL at 12:43

## 2024-02-05 RX ADMIN — SEVELAMER CARBONATE 1600 MG: 800 TABLET, FILM COATED ORAL at 10:00

## 2024-02-05 RX ADMIN — GUAIFENESIN AND DEXTROMETHORPHAN 5 ML: 100; 10 SYRUP ORAL at 10:02

## 2024-02-05 RX ADMIN — ATORVASTATIN CALCIUM 40 MG: 40 TABLET, FILM COATED ORAL at 20:31

## 2024-02-05 RX ADMIN — FERROUS SULFATE TAB 325 MG (65 MG ELEMENTAL FE) 325 MG: 325 (65 FE) TAB at 12:43

## 2024-02-05 RX ADMIN — ISOSORBIDE MONONITRATE 120 MG: 60 TABLET, EXTENDED RELEASE ORAL at 20:31

## 2024-02-05 RX ADMIN — BENZONATATE 100 MG: 100 CAPSULE ORAL at 20:32

## 2024-02-05 RX ADMIN — Medication 400 UNITS: at 09:58

## 2024-02-05 RX ADMIN — FAMOTIDINE 20 MG: 20 TABLET, FILM COATED ORAL at 09:56

## 2024-02-05 RX ADMIN — ISOSORBIDE MONONITRATE 120 MG: 60 TABLET, EXTENDED RELEASE ORAL at 09:56

## 2024-02-05 RX ADMIN — HYDRALAZINE HYDROCHLORIDE 50 MG: 50 TABLET ORAL at 01:55

## 2024-02-05 RX ADMIN — SEVELAMER CARBONATE 1600 MG: 800 TABLET, FILM COATED ORAL at 16:39

## 2024-02-05 RX ADMIN — GUAIFENESIN AND DEXTROMETHORPHAN 5 ML: 100; 10 SYRUP ORAL at 15:39

## 2024-02-05 RX ADMIN — HYDRALAZINE HYDROCHLORIDE 100 MG: 50 TABLET, FILM COATED ORAL at 16:39

## 2024-02-05 RX ADMIN — CLONIDINE HYDROCHLORIDE 0.3 MG: 0.2 TABLET ORAL at 20:31

## 2024-02-05 RX ADMIN — ASPIRIN 81 MG: 81 TABLET, COATED ORAL at 09:56

## 2024-02-05 RX ADMIN — ACETAMINOPHEN 650 MG: 325 TABLET ORAL at 10:02

## 2024-02-05 RX ADMIN — HEPARIN SODIUM 5000 UNITS: 5000 INJECTION INTRAVENOUS; SUBCUTANEOUS at 04:07

## 2024-02-05 RX ADMIN — Medication 1000 UNITS: at 09:56

## 2024-02-05 RX ADMIN — FERROUS SULFATE TAB 325 MG (65 MG ELEMENTAL FE) 325 MG: 325 (65 FE) TAB at 16:39

## 2024-02-05 RX ADMIN — BENZONATATE 100 MG: 100 CAPSULE ORAL at 10:02

## 2024-02-05 RX ADMIN — FERROUS SULFATE TAB 325 MG (65 MG ELEMENTAL FE) 325 MG: 325 (65 FE) TAB at 09:58

## 2024-02-05 RX ADMIN — FAMOTIDINE 20 MG: 20 TABLET, FILM COATED ORAL at 20:31

## 2024-02-05 RX ADMIN — SODIUM CHLORIDE, PRESERVATIVE FREE 10 ML: 5 INJECTION INTRAVENOUS at 10:01

## 2024-02-05 ASSESSMENT — PAIN SCALES - GENERAL
PAINLEVEL_OUTOF10: 6
PAINLEVEL_OUTOF10: 4

## 2024-02-05 ASSESSMENT — PAIN DESCRIPTION - LOCATION
LOCATION: THROAT
LOCATION: THROAT

## 2024-02-05 ASSESSMENT — PAIN DESCRIPTION - DESCRIPTORS
DESCRIPTORS: ACHING
DESCRIPTORS: ACHING;DISCOMFORT

## 2024-02-05 NOTE — RT PROTOCOL NOTE
RT Inhaler-Nebulizer Bronchodilator Protocol Note    There is a bronchodilator order in the chart from a provider indicating to follow the RT Bronchodilator Protocol and there is an “Initiate RT Inhaler-Nebulizer Bronchodilator Protocol” order as well (see protocol at bottom of note).    CXR Findings:  No results found.    The findings from the last RT Protocol Assessment were as follows:   History Pulmonary Disease: Smoker 15 pack years or more  Respiratory Pattern: Regular pattern and RR 12-20 bpm  Breath Sounds: Clear breath sounds  Cough: Strong, spontaneous, non-productive  Indication for Bronchodilator Therapy:    Bronchodilator Assessment Score: 1    Aerosolized bronchodilator medication orders have been revised according to the RT Inhaler-Nebulizer Bronchodilator Protocol below.    Respiratory Therapist to perform RT Therapy Protocol Assessment initially then follow the protocol.  Repeat RT Therapy Protocol Assessment PRN for score 0-3 or on second treatment, BID, and PRN for scores above 3.    No Indications - adjust the frequency to every 6 hours PRN wheezing or bronchospasm, if no treatments needed after 48 hours then discontinue using Per Protocol order mode.     If indication present, adjust the RT bronchodilator orders based on the Bronchodilator Assessment Score as indicated below.  Use Inhaler orders unless patient has one or more of the following: on home nebulizer, not able to hold breath for 10 seconds, is not alert and oriented, cannot activate and use MDI correctly, or respiratory rate 25 breaths per minute or more, then use the equivalent nebulizer order(s) with same Frequency and PRN reasons based on the score.  If a patient is on this medication at home then do not decrease Frequency below that used at home.    0-3 - enter or revise RT bronchodilator order(s) to equivalent RT Bronchodilator order with Frequency of every 4 hours PRN for wheezing or increased work of breathing using Per

## 2024-02-05 NOTE — CARE COORDINATION
Case Management Assessment  Initial Evaluation    Date/Time of Evaluation: 2/5/2024 11:40 AM  Assessment Completed by: Kiet Perez RN    If patient is discharged prior to next notation, then this note serves as note for discharge by case management.    Patient Name: Trav Jennings                   YOB: 1965  Diagnosis: Shortness of breath [R06.02]  Abdominal pain, epigastric [R10.13]  Nausea [R11.0]  Abdominal pain [R10.9]  Prolonged QT interval [R94.31]  Abdominal aortic aneurysm dissection (HCC) [I71.02]  Chest pain, unspecified type [R07.9]                   Date / Time: 2/3/2024  5:59 PM  Location: [facility]06/006     Patient Admission Status: Observation   Readmission Risk Low 0-14, Mod 15-19), High > 20: Readmission Risk Score: 26.2    Current PCP: Radha Hou APRN - CNP  PCP verified by CM? Yes    Chart Reviewed: Yes      History Provided by: Patient  Patient Orientation: Alert and Oriented    Patient Cognition: Alert    Hospitalization in the last 30 days (Readmission):  No    If yes, Readmission Assessment in CM Navigator will be completed.    Advance Directives:      Code Status: Full Code   Patient's Primary Decision Maker is: Legal Next of Kin      Discharge Planning:    Patient lives with: Spouse/Significant Other Type of Home: House  Primary Care Giver: Self  Patient Support Systems include: Family Members, Spouse/Significant Other   Current Financial resources: Medicare  Current community resources: Other (Comment) (OP HD at [facility])  Current services prior to admission: Oxygen Therapy (2-4 L/min as needed by patient; started about a year ago)            Current DME:              Type of Home Care services:  None    ADLS  Prior functional level: Independent in ADLs/IADLs  Current functional level: Independent in ADLs/IADLs    Family can provide assistance at DC: Yes  Would you like Case Management to discuss the discharge plan with any other family members/significant others, and

## 2024-02-06 VITALS
BODY MASS INDEX: 25.88 KG/M2 | OXYGEN SATURATION: 100 % | RESPIRATION RATE: 18 BRPM | WEIGHT: 208.11 LBS | TEMPERATURE: 97.8 F | DIASTOLIC BLOOD PRESSURE: 91 MMHG | HEART RATE: 85 BPM | SYSTOLIC BLOOD PRESSURE: 177 MMHG | HEIGHT: 75 IN

## 2024-02-06 LAB
ANION GAP SERPL CALC-SCNC: 16 MEQ/L (ref 8–16)
BUN SERPL-MCNC: 54 MG/DL (ref 7–22)
CALCIUM SERPL-MCNC: 8 MG/DL (ref 8.5–10.5)
CHLORIDE SERPL-SCNC: 97 MEQ/L (ref 98–111)
CO2 SERPL-SCNC: 22 MEQ/L (ref 23–33)
CREAT SERPL-MCNC: 10.6 MG/DL (ref 0.4–1.2)
DEPRECATED RDW RBC AUTO: 50.8 FL (ref 35–45)
EPSTEIN-BARR VIRUS ANTIBODIES: NORMAL
ERYTHROCYTE [DISTWIDTH] IN BLOOD BY AUTOMATED COUNT: 13.4 % (ref 11.5–14.5)
GFR SERPL CREATININE-BSD FRML MDRD: 5 ML/MIN/1.73M2
GLUCOSE SERPL-MCNC: 87 MG/DL (ref 70–108)
HCT VFR BLD AUTO: 27.5 % (ref 42–52)
HGB BLD-MCNC: 8.5 GM/DL (ref 14–18)
MCH RBC QN AUTO: 32.7 PG (ref 26–33)
MCHC RBC AUTO-ENTMCNC: 30.9 GM/DL (ref 32.2–35.5)
MCV RBC AUTO: 105.8 FL (ref 80–94)
PLATELET # BLD AUTO: 165 THOU/MM3 (ref 130–400)
PMV BLD AUTO: 10 FL (ref 9.4–12.4)
POTASSIUM SERPL-SCNC: 5.5 MEQ/L (ref 3.5–5.2)
RBC # BLD AUTO: 2.6 MILL/MM3 (ref 4.7–6.1)
SODIUM SERPL-SCNC: 135 MEQ/L (ref 135–145)
WBC # BLD AUTO: 6.6 THOU/MM3 (ref 4.8–10.8)

## 2024-02-06 PROCEDURE — 99231 SBSQ HOSP IP/OBS SF/LOW 25: CPT | Performed by: REGISTERED NURSE

## 2024-02-06 PROCEDURE — 90935 HEMODIALYSIS ONE EVALUATION: CPT

## 2024-02-06 PROCEDURE — 2580000003 HC RX 258

## 2024-02-06 PROCEDURE — 6360000002 HC RX W HCPCS

## 2024-02-06 PROCEDURE — 6370000000 HC RX 637 (ALT 250 FOR IP)

## 2024-02-06 PROCEDURE — 80048 BASIC METABOLIC PNL TOTAL CA: CPT

## 2024-02-06 PROCEDURE — 85027 COMPLETE CBC AUTOMATED: CPT

## 2024-02-06 PROCEDURE — 96372 THER/PROPH/DIAG INJ SC/IM: CPT

## 2024-02-06 PROCEDURE — 6360000002 HC RX W HCPCS: Performed by: PHYSICIAN ASSISTANT

## 2024-02-06 PROCEDURE — 6370000000 HC RX 637 (ALT 250 FOR IP): Performed by: PHYSICIAN ASSISTANT

## 2024-02-06 PROCEDURE — 99239 HOSP IP/OBS DSCHRG MGMT >30: CPT | Performed by: PHYSICIAN ASSISTANT

## 2024-02-06 PROCEDURE — 36415 COLL VENOUS BLD VENIPUNCTURE: CPT

## 2024-02-06 PROCEDURE — G0378 HOSPITAL OBSERVATION PER HR: HCPCS

## 2024-02-06 PROCEDURE — 6370000000 HC RX 637 (ALT 250 FOR IP): Performed by: INTERNAL MEDICINE

## 2024-02-06 RX ORDER — AMOXICILLIN AND CLAVULANATE POTASSIUM 875; 125 MG/1; MG/1
1 TABLET, FILM COATED ORAL 2 TIMES DAILY
Qty: 10 TABLET | Refills: 0 | Status: SHIPPED | OUTPATIENT
Start: 2024-02-06 | End: 2024-02-11

## 2024-02-06 RX ADMIN — SODIUM CHLORIDE, PRESERVATIVE FREE 10 ML: 5 INJECTION INTRAVENOUS at 12:36

## 2024-02-06 RX ADMIN — Medication 400 UNITS: at 12:32

## 2024-02-06 RX ADMIN — Medication 5 ML: at 12:39

## 2024-02-06 RX ADMIN — SEVELAMER CARBONATE 1600 MG: 800 TABLET, FILM COATED ORAL at 12:29

## 2024-02-06 RX ADMIN — SERTRALINE 100 MG: 100 TABLET, FILM COATED ORAL at 12:30

## 2024-02-06 RX ADMIN — BENZONATATE 100 MG: 100 CAPSULE ORAL at 12:39

## 2024-02-06 RX ADMIN — FOLIC ACID 1 MG: 1 TABLET ORAL at 12:33

## 2024-02-06 RX ADMIN — GUAIFENESIN AND DEXTROMETHORPHAN 5 ML: 100; 10 SYRUP ORAL at 03:54

## 2024-02-06 RX ADMIN — Medication 1 TABLET: at 12:30

## 2024-02-06 RX ADMIN — HYDRALAZINE HYDROCHLORIDE 100 MG: 50 TABLET, FILM COATED ORAL at 12:29

## 2024-02-06 RX ADMIN — FAMOTIDINE 20 MG: 20 TABLET, FILM COATED ORAL at 12:36

## 2024-02-06 RX ADMIN — ISOSORBIDE MONONITRATE 120 MG: 60 TABLET, EXTENDED RELEASE ORAL at 12:30

## 2024-02-06 RX ADMIN — BENZOCAINE 6 MG-MENTHOL 10 MG LOZENGES 1 LOZENGE: at 03:55

## 2024-02-06 RX ADMIN — CALCITRIOL CAPSULES 0.25 MCG 0.5 MCG: 0.25 CAPSULE ORAL at 12:31

## 2024-02-06 RX ADMIN — BENZOCAINE 6 MG-MENTHOL 10 MG LOZENGES 1 LOZENGE: at 12:39

## 2024-02-06 RX ADMIN — VERAPAMIL HYDROCHLORIDE 240 MG: 240 TABLET, FILM COATED, EXTENDED RELEASE ORAL at 12:37

## 2024-02-06 RX ADMIN — BENZONATATE 100 MG: 100 CAPSULE ORAL at 05:39

## 2024-02-06 RX ADMIN — CINACALCET HYDROCHLORIDE 150 MG: 30 TABLET, FILM COATED ORAL at 12:29

## 2024-02-06 RX ADMIN — FERROUS SULFATE TAB 325 MG (65 MG ELEMENTAL FE) 325 MG: 325 (65 FE) TAB at 12:38

## 2024-02-06 RX ADMIN — Medication 1000 UNITS: at 12:30

## 2024-02-06 RX ADMIN — ASPIRIN 81 MG: 81 TABLET, COATED ORAL at 12:36

## 2024-02-06 RX ADMIN — PENICILLIN G BENZATHINE 1.2 MILLION UNITS: 1200000 INJECTION, SUSPENSION INTRAMUSCULAR at 14:28

## 2024-02-06 RX ADMIN — CLONIDINE HYDROCHLORIDE 0.3 MG: 0.2 TABLET ORAL at 12:31

## 2024-02-06 RX ADMIN — HEPARIN SODIUM 5000 UNITS: 5000 INJECTION INTRAVENOUS; SUBCUTANEOUS at 05:38

## 2024-02-06 ASSESSMENT — PAIN DESCRIPTION - DESCRIPTORS: DESCRIPTORS: ACHING;DISCOMFORT

## 2024-02-06 ASSESSMENT — PAIN DESCRIPTION - LOCATION: LOCATION: THROAT

## 2024-02-06 ASSESSMENT — PAIN SCALES - GENERAL: PAINLEVEL_OUTOF10: 4

## 2024-02-06 NOTE — PLAN OF CARE
Problem: Discharge Planning  Goal: Discharge to home or other facility with appropriate resources  Outcome: Adequate for Discharge     Problem: Pain  Goal: Verbalizes/displays adequate comfort level or baseline comfort level  Outcome: Adequate for Discharge     Problem: Safety - Adult  Goal: Free from fall injury  Outcome: Adequate for Discharge     Problem: ABCDS Injury Assessment  Goal: Absence of physical injury  Outcome: Adequate for Discharge     Problem: Respiratory - Adult  Goal: Achieves optimal ventilation and oxygenation  Outcome: Adequate for Discharge     Problem: Cardiovascular - Adult  Goal: Maintains optimal cardiac output and hemodynamic stability  Outcome: Adequate for Discharge     Problem: Musculoskeletal - Adult  Goal: Return ADL status to a safe level of function  Outcome: Adequate for Discharge     Problem: Chronic Conditions and Co-morbidities  Goal: Patient's chronic conditions and co-morbidity symptoms are monitored and maintained or improved  Outcome: Adequate for Discharge     Problem: Skin/Tissue Integrity - Adult  Goal: Skin integrity remains intact  Outcome: Adequate for Discharge  Goal: Incisions, wounds, or drain sites healing without S/S of infection  Outcome: Adequate for Discharge  Goal: Oral mucous membranes remain intact  Outcome: Adequate for Discharge     Problem: Gastrointestinal - Adult  Goal: Minimal or absence of nausea and vomiting  Outcome: Adequate for Discharge  Goal: Maintains or returns to baseline bowel function  Outcome: Adequate for Discharge  Goal: Maintains adequate nutritional intake  Outcome: Adequate for Discharge  Goal: Establish and maintain optimal ostomy function  Outcome: Adequate for Discharge     Problem: Genitourinary - Adult  Goal: Absence of urinary retention  Outcome: Adequate for Discharge  Goal: Urinary catheter remains patent  Outcome: Adequate for Discharge     
  Problem: Discharge Planning  Goal: Discharge to home or other facility with appropriate resources  Outcome: Progressing  Flowsheets (Taken 2/4/2024 0639)  Discharge to home or other facility with appropriate resources:   Identify barriers to discharge with patient and caregiver   Arrange for needed discharge resources and transportation as appropriate   Identify discharge learning needs (meds, wound care, etc)     Problem: Pain  Goal: Verbalizes/displays adequate comfort level or baseline comfort level  Outcome: Progressing  Flowsheets (Taken 2/4/2024 0639)  Verbalizes/displays adequate comfort level or baseline comfort level:   Encourage patient to monitor pain and request assistance   Assess pain using appropriate pain scale   Administer analgesics based on type and severity of pain and evaluate response     Problem: Safety - Adult  Goal: Free from fall injury  Outcome: Progressing  Flowsheets (Taken 2/4/2024 0639)  Free From Fall Injury: Instruct family/caregiver on patient safety     Problem: ABCDS Injury Assessment  Goal: Absence of physical injury  Outcome: Progressing  Flowsheets (Taken 2/4/2024 0639)  Absence of Physical Injury: Implement safety measures based on patient assessment     Problem: Respiratory - Adult  Goal: Achieves optimal ventilation and oxygenation  Outcome: Progressing  Flowsheets (Taken 2/4/2024 0639)  Achieves optimal ventilation and oxygenation:   Assess for changes in respiratory status   Assess for changes in mentation and behavior   Position to facilitate oxygenation and minimize respiratory effort   Oxygen supplementation based on oxygen saturation or arterial blood gases   Assess and instruct to report shortness of breath or any respiratory difficulty     Problem: Cardiovascular - Adult  Goal: Maintains optimal cardiac output and hemodynamic stability  Outcome: Progressing  Flowsheets (Taken 2/4/2024 0639)  Maintains optimal cardiac output and hemodynamic stability:   Monitor 
  Problem: Discharge Planning  Goal: Discharge to home or other facility with appropriate resources  Outcome: Progressing  Flowsheets (Taken 2/5/2024 0214)  Discharge to home or other facility with appropriate resources:   Identify barriers to discharge with patient and caregiver   Arrange for needed discharge resources and transportation as appropriate     Problem: Pain  Goal: Verbalizes/displays adequate comfort level or baseline comfort level  Outcome: Progressing  Flowsheets (Taken 2/5/2024 0214)  Verbalizes/displays adequate comfort level or baseline comfort level:   Encourage patient to monitor pain and request assistance   Assess pain using appropriate pain scale   Administer analgesics based on type and severity of pain and evaluate response     Problem: Safety - Adult  Goal: Free from fall injury  Outcome: Progressing  Flowsheets (Taken 2/5/2024 0214)  Free From Fall Injury: Instruct family/caregiver on patient safety     Problem: ABCDS Injury Assessment  Goal: Absence of physical injury  Outcome: Progressing  Flowsheets (Taken 2/5/2024 0214)  Absence of Physical Injury: Implement safety measures based on patient assessment     Problem: Respiratory - Adult  Goal: Achieves optimal ventilation and oxygenation  Outcome: Progressing  Flowsheets (Taken 2/5/2024 0214)  Achieves optimal ventilation and oxygenation:   Assess for changes in respiratory status   Assess for changes in mentation and behavior   Position to facilitate oxygenation and minimize respiratory effort   Oxygen supplementation based on oxygen saturation or arterial blood gases     Problem: Cardiovascular - Adult  Goal: Maintains optimal cardiac output and hemodynamic stability  Outcome: Progressing  Flowsheets (Taken 2/5/2024 0214)  Maintains optimal cardiac output and hemodynamic stability:   Monitor blood pressure and heart rate   Monitor urine output and notify Licensed Independent Practitioner for values outside of normal range     Problem: 
(Taken 2/5/2024 2231)  Return ADL Status to a Safe Level of Function: Assess activities of daily living deficits and provide assistive devices as needed     Problem: Chronic Conditions and Co-morbidities  Goal: Patient's chronic conditions and co-morbidity symptoms are monitored and maintained or improved  Outcome: Progressing  Flowsheets (Taken 2/5/2024 2231)  Care Plan - Patient's Chronic Conditions and Co-Morbidity Symptoms are Monitored and Maintained or Improved:   Monitor and assess patient's chronic conditions and comorbid symptoms for stability, deterioration, or improvement   Collaborate with multidisciplinary team to address chronic and comorbid conditions and prevent exacerbation or deterioration   Update acute care plan with appropriate goals if chronic or comorbid symptoms are exacerbated and prevent overall improvement and discharge     Problem: Skin/Tissue Integrity - Adult  Goal: Skin integrity remains intact  Outcome: Progressing  Flowsheets (Taken 2/5/2024 2231)  Skin Integrity Remains Intact: Monitor for areas of redness and/or skin breakdown     Problem: Skin/Tissue Integrity - Adult  Goal: Incisions, wounds, or drain sites healing without S/S of infection  Outcome: Progressing  Flowsheets (Taken 2/5/2024 2231)  Incisions, Wounds, or Drain Sites Healing Without Sign and Symptoms of Infection: ADMISSION and DAILY: Assess and document risk factors for pressure ulcer development     Problem: Skin/Tissue Integrity - Adult  Goal: Oral mucous membranes remain intact  Outcome: Progressing  Flowsheets (Taken 2/5/2024 2231)  Oral Mucous Membranes Remain Intact: Assess oral mucosa and hygiene practices     Problem: Gastrointestinal - Adult  Goal: Minimal or absence of nausea and vomiting  Outcome: Progressing  Flowsheets (Taken 2/5/2024 2231)  Minimal or absence of nausea and vomiting: Provide nonpharmacologic comfort measures as appropriate     Problem: Gastrointestinal - Adult  Goal: Maintains or returns

## 2024-02-06 NOTE — FLOWSHEET NOTE
Stable 4 hour TX completed. Removed 4 L of fluid. pt tolerated fluid removal well. Pressure held to each needle site x 10 min. Drsg clean, dry, and intact upon leaving unit. TX record printed for scanning into EMR. Report called to primary RN.   02/06/24 0727 02/06/24 1150   Vital Signs   BP (!) 186/89 (!) 177/91   Temp 97.3 °F (36.3 °C) 97.8 °F (36.6 °C)   Pulse 85 85   Respirations 18 18   SpO2 100 % 100 %   Weight - Scale 98.4 kg (216 lb 14.9 oz) 94.4 kg (208 lb 1.8 oz)   Weight Method Standing scale Bed scale   Percent Weight Change 2.18 -4.06   Post-Hemodialysis Assessment   Post-Treatment Procedures  --  Blood returned;Access bleeding time < 10 minutes   Machine Disinfection Process  --  Acid/Vinegar Clean;Heat Disinfect;Exterior Machine Disinfection   Blood Volume Processed (Liters)  --  116.1 L   Dialyzer Clearance  --  Lightly streaked   Duration of Treatment (minutes)  --  240 minutes   Hemodialysis Intake (ml)  --  400 ml   Hemodialysis Output (ml)  --  4400 ml   NET Removed (ml)  --  4000   Tolerated Treatment  --  Good

## 2024-02-06 NOTE — PROGRESS NOTES
Hospitalist Progress Note      Patient:  Trav Jennings    Unit/Bed:6K-06/006-A  YOB: 1965  MRN: [id number]   Acct: [id number]   PCP: Radha Hou APRN - CNP  Date of Admission: 2/3/2024    Assessment/Plan:    Neck Edema: Improving. ENT consulted. IV steroids completed today. Pepcid BID. Benadryl. FLD for now. Advance diet tomorrow. Hold off on ABX at this time.   Right upper quadrant pain: With associated nausea and vomiting.  LFTs unremarkable.  Imaging unremarkable.  Right upper quadrant ultrasound unremarkable.  Advance diet as tolerated.   Upper respiratory infection: Flu negative.  COVID negative.  All labs within normal limits.  Supportive care.  Unsure if viral illness is responsible for neck edema?  Prolonged QTc: This is chronic.  Telemetry.  ESRD on HD: Tuesday Thursday Saturday.  Nephrology consulted.  Patient underwent dialysis yesterday.  Daily BMP.  I's and O's and daily weights.  Obstructive hypertrophic cardiomyopathy: Secondary to uncontrolled hypertension.  Echo (5/23) significant LVH with a ejection fraction of 70%.  NIDDM: Controlled.  Last A1c controlled at 5.5.  Carb controlled diet.  HLD: Statin  Hypertension: Chronically uncontrolled.  Continue home medications.  Chronic AAA: Vascular consulted.  Saw patient today in no acute issues needed patient will follow-up with vascular surgeon in [place] as an outpatient.    Chief Complaint: Nausea & vomiting     Initial H and P:-    Initial H&P \"Trav Jennings is a 59 y.o. male with PMHx of ESRD on HD, AAA, HLD, HTN, and depression who presented to [facility] with chief complaint of URI symptoms and abdominal pain. The patient reports his symptoms began approximately 1 week ago.  He complains of nasal congestion, cough, low grade fever (100 max), chills, body aches and some mild shortness of breath.  Patient also reports that over the last month, he has had epigastric 
                                                                       Hospitalist Progress Note      Patient:  Trav Jennings    Unit/Bed:6K-06/006-A  YOB: 1965  MRN: [id number]   Acct: [id number]   PCP: Radha Hou APRN - CNP  Date of Admission: 2/3/2024    Assessment/Plan:    Neck Edema: ENT consulted. Case discussed with Dr. Horne (Radiology) and ENT (Bola Cain PA-C) about rapidly occurring neck edema. IV steroids. Pepcid BID. Benadryl. Low threshold for ICU and HFNC. Multiple labs sent for etiology.   Right upper quadrant pain: With associated nausea and vomiting.  LFTs unremarkable.  Imaging unremarkable.  Right upper quadrant ultrasound unremarkable.  Advance diet as tolerated.   Upper respiratory infection: Flu negative.  COVID negative.  All labs within normal limits.  Supportive care.  Unsure if viral illness is responsible for neck edema?  Prolonged QTc: This is chronic.  Telemetry.  ESRD on HD: Tuesday Thursday Saturday.  Nephrology consulted.  Patient underwent dialysis yesterday.  Daily BMP.  I's and O's and daily weights.  Obstructive hypertrophic cardiomyopathy: Secondary to uncontrolled hypertension.  Echo (5/23) significant LVH with a ejection fraction of 70%.  NIDDM: Controlled.  Last A1c controlled at 5.5.  Carb controlled diet.  HLD: Statin  Hypertension: Chronically uncontrolled.  Continue home medications.  Chronic AAA: Vascular consulted.  Saw patient today in no acute issues needed patient will follow-up with vascular surgeon in [place] as an outpatient.    Case discussed with radiology and ENT.    Chief Complaint: Nausea & vomiting     Initial H and P:-    Initial H&P \"Trav Jennings is a 59 y.o. male with PMHx of ESRD on HD, AAA, HLD, HTN, and depression who presented to [facility] with chief complaint of URI symptoms and abdominal pain. The patient reports his symptoms began approximately 1 week ago.  He complains of nasal congestion, cough, low grade fever (100 max), 
Department of Otolaryngology  Progress Note    Chief Complaint:  neck swelling    Initial ENT HPI: The patient is a 59 y.o. male who originally presented to [facility] ER yesterday evening for evaluation of illness for 1 week with nasal congestion, headache, nausea, decreased appetite, and fevers.  He has a history of CKD on dialysis Tuesday Thursday Saturday.  Patient was admitted due to concern for dissection flap of the aorta as well as assessment/management of right upper quadrant/epigastric pain and URI.  Patient was sitting in the hospital bed earlier this morning when he suddenly began to send some swelling in the left side of his neck.  Nursing staff was contacted and they came in to assess and noted a significant amount of swelling of the left neck and nursing reports that she could physically see the right neck gradually swelling.  Hospitalist was consulted who returned to examine the patient.  The swelling was not present on her initial assessment about 30 minutes prior, but had noticeably changed when she arrived.  Stat CT neck was ordered which revealed diffuse edematous changes of the neck, including the submandibular glands bilaterally.  Radiology interpretation appeared most consistent with angioedema, but infection also considered. Patient started on benadryl and pepcid with ENT consultation. Upon my arrival the patient reports that the gradual swelling has seemed to jeanine, or even slightly improved compared to when the swelling initially presented. He reports it is mildly tender to palpation with palpation of the swollen areas.  He reports some mild tenderness with swallowing as well.  He reports that his neck feels tight, but is not having difficulty breathing. He had not received any new medications/foods around the time symptoms started. He denies a personal/family history of sudden onset facial/neck swelling. He denies recent/recurrent dental pain. He has remained afebrile since admission. 
[facility]   Nephrology progress note  2/5/2024, 9:34 AM      Pt Name:    Trav Jennings  MRN:     [id number]     YOB: 1965  Admit Date:    2/3/2024  5:59 PM    Chief Complaint: Nephrology following for ESRD    Subjective:  Patient was seen and examined this morning.  Patient sitting comfortably in bed.  Patient reports productive cough with yellow/black sputum.  Lightheadedness, dizziness, nausea, vomiting, chest pain with coughing, and shortness of breath.  Patient denies shakes, fever, chills    Objective:  24HR INTAKE/OUTPUT:    Intake/Output Summary (Last 24 hours) at 2/5/2024 0934  Last data filed at 2/5/2024 0400  Gross per 24 hour   Intake 440 ml   Output 0 ml   Net 440 ml         I/O last 3 completed shifts:  In: 965.6 [P.O.:920; IV Piggyback:45.6]  Out: 0   No intake/output data recorded.   Admission weight: 92.4 kg (203 lb 11.2 oz)  Wt Readings from Last 3 Encounters:   02/05/24 95.4 kg (210 lb 5.1 oz)   12/25/23 92.6 kg (204 lb 2.3 oz)   12/03/23 95 kg (209 lb 6.4 oz)        Vitals :   Vitals:    02/04/24 2000 02/04/24 2047 02/04/24 2312 02/05/24 0400   BP: (!) 158/78  (!) 155/74 (!) 161/80   Pulse: 87 86 88 89   Resp: 16 18 18 18   Temp: 98.4 °F (36.9 °C)  98.5 °F (36.9 °C) 98.7 °F (37.1 °C)   TempSrc: Oral  Oral Oral   SpO2: 100% 97% 96% 99%   Weight:    95.4 kg (210 lb 5.1 oz)   Height:           Physical examination  General Appearance: alert and cooperative with exam, appears comfortable, no distress  Mouth/Throat: Oral mucosa moist  Neck: No JVD.  Mild swelling primarily left side of neck.  Lungs: Air entry B/L, no rales, no use of accessory muscles  Heart:  S1, S2 heard  GI: soft, non-tender, no guarding  Extremities: No LE edema    Medications:  Infusion:    sodium chloride       Meds:    aspirin  81 mg Oral Daily    atorvastatin  40 mg Oral Nightly    folbee plus  1 tablet Oral Daily    calcitRIOL  0.5 mcg Oral Once per day on Mon Wed Fri    cinacalcet  150 mg 
Patient left unit once he received medication from outpatient pharmacy, without telling this nurse.  called and said he wanted a cab. This was not mentioned on unit. Charge nurse arranged for cab and this nurse called  to inform patient.   
Sent Ban SURESH message regarding pt request for diet order waiting on response.   
Writer called to bedside by wife stating something was going on with hisband when writer got to bedside pt stated that his neck has swelled on the left side. As writer was standing at the bedside pt stated he felt the right side of his neck begin to swell. Writer then observed pt neck swelling. Writer assessed pt and asked pt if he was having any issues breathing or if he felt like his airway was being occluded. Pt stated no. Writer sent secure message to Ban SURESH explaining and requesting to see pt at bedside. Ban came to bedside assessed pt and ordered imaging and medications. Pt resting in bed call light in reach wife at bedside no signs of distress observed at this time.   
cloNIDine  0.3 mg Oral TID    ferrous sulfate  325 mg Oral TID WC    folic acid  1 mg Oral Daily    isosorbide mononitrate  120 mg Oral BID    sertraline  100 mg Oral Daily    sevelamer  1,600 mg Oral TID WC    verapamil  240 mg Oral Daily    Vitamin D  1,000 Units Oral BID    vitamin E  400 Units Oral Daily    famotidine  20 mg Oral BID    sodium chloride flush  5-40 mL IntraVENous 2 times per day    heparin (porcine)  5,000 Units SubCUTAneous 3 times per day     Meds prn: Benzocaine-Menthol, HYDROcodone-chlorpheniramine, traZODone, albuterol, guaiFENesin-dextromethorphan, sodium chloride flush, sodium chloride, ondansetron **OR** ondansetron, polyethylene glycol, acetaminophen **OR** acetaminophen, benzonatate, sodium chloride     Lab Data :  CBC:   Recent Labs     02/04/24  0533 02/05/24  0842 02/06/24  0547   WBC 4.6* 5.5 6.6   HGB 9.4* 8.8* 8.5*   HCT 31.1* 29.1* 27.5*    177 165     CMP:  Recent Labs     02/03/24  1851 02/04/24  0533 02/05/24  0842 02/05/24  1236 02/06/24  0547      < > 133* 134* 135   K 4.2   < > 4.9 4.9 5.5*      < > 96* 96* 97*   CO2 28   < > 22* 25 22*   BUN 28*   < > 49* 50* 54*   CREATININE 5.9*   < > 8.4* 8.7* 10.6*   GLUCOSE 105   < > 229* 145* 87   CALCIUM 7.5*   < > 7.8* 7.8* 8.0*   MG 2.0  --   --   --   --    PHOS 3.2  --   --   --   --     < > = values in this interval not displayed.     Hepatic:   Recent Labs     02/03/24  1851   LABALBU 3.8   AST 12   ALT 6*   BILITOT 0.3   ALKPHOS 108         Assessment and Plan:  ESRD on hemodialysis TTS.  Patient does not produce any urine.  Patient scheduled undergo dialysis 2/6  Hyperkalemia: 2/6 potassium 5.5.  Will reassess after dialysis.  Anemia in ESRD.  Neck swelling: Possibly angioedema.  On IV steroids.  ENT and hospitalist managing.  Chronic hypertension: Increase hydralazine on 2/5.  Continue.  Chronic volume overloaded      D/W patient and RN    Velasquez Mehta MD    Case and plan discussed with  
02/03/2024 06:51 PM    CREATININE 10.6 02/06/2024 05:47 AM    CALCIUM 8.0 02/06/2024 05:47 AM    LABGLOM 5 02/06/2024 05:47 AM    GLUCOSE 87 02/06/2024 05:47 AM    GLUCOSE 104 02/16/2012 11:20 AM     Radiology Review:  CT soft tissue neck w contrast 2/4/24-  FINDINGS:     The soft tissues of the neck are diffusely edematous. This is present along the floor the mouth. This surrounds both submandibular glands. The floor the mouth appears edematous. On the sagittal images, the epiglottis is not thickened. The subcutaneous   tissues are thickened. The soft tissues of the nasopharynx and oropharynx appear edematous. The airway is patent.     The soft tissues and fat planes surrounding the vessels and surrounding the sternocleidomastoid muscles appear edematous.     The thyroid gland appears normal. Submandibular glands are edematous. There is no asymmetry. There are no stones along the floor the mouth. The parotid glands are grossly normal.     There is no cervical adenopathy. There is no upper mediastinal adenopathy.     The patient has an aortic stent graft in the aortic arch. Surrounding mural thrombus appears unchanged. There are no suspicious findings in the lung apices. No pulmonary edema is seen. The vessels of the lower neck are engorged. These were also engorged   previously. The right internal jugular vein is filling with contrast. The left is not. This may be related to contrast timing. There is mucosal thickening in both maxillary sinuses. The mastoid air cells are normally aerated.  There are no gross   abnormalities in the brain parenchyma.        IMPRESSION:     1. Diffusely edematous appearing neck soft tissues. This includes the deep soft tissues surrounding the airway. The airway is patent. Findings are most suggestive of angioedema. Diffuse infection can also have this appearance however that does not fit   the provided clinical history.  2. Normal epiglottis.          Inpatient Medications  Current

## 2024-02-07 ENCOUNTER — TELEPHONE (OUTPATIENT)
Dept: ENT CLINIC | Age: 59
End: 2024-02-07

## 2024-02-07 LAB
MUV IGG TITR SER: 3.68 {TITER}
MUV IGM SER IA-ACNC: NORMAL

## 2024-02-07 NOTE — TELEPHONE ENCOUNTER
Patient seen in hospital for concern for angioedema and seen by Bola and myself. Patient discharged home yesterday. Please call him to arrange hospital follow-up; okay'd by Bola to be seen 2/21 at 10:30 AM.  Thank you!

## 2024-02-07 NOTE — DISCHARGE SUMMARY
similar to prior examination from 9/8/2023 likely representing scarring. 4. There is no CT angiographic evidence of pulmonary embolism. **This report has been created using voice recognition software.  It may contain minor errors which are inherent in voice recognition technology.**  Final report electronically signed by Dr. Cecilio Lee on 2/3/2024 9:08 PM       Follow-up scheduled after discharge :-    in the next few days with Radha Hou APRN - CNP    Consultations during this hospital stay:-  [] NONE [] Cardiology  [] Nephrology  [] Hemo onco   [] GI   [] ID  [] Endocrine  [] Pulm    [] Neuro    [] Psych   [] Urology  [] ENT   [] G SURGERY   []Ortho    []CV surg    [] Palliative  [] Hospice [] Pain management   []    []TCU   [] PT/OT  OTHERS:-    Disposition: home  Condition at Discharge: Stable    Time Spent:- 40 minutes    Electronically signed by DEMETRICE Dodd on 2/7/24 at 12:53 PM EST   Discharging Hospitalist

## 2024-02-11 LAB
BACTERIA BLD AEROBE CULT: NORMAL
BACTERIA BLD AEROBE CULT: NORMAL

## 2024-02-16 ENCOUNTER — APPOINTMENT (OUTPATIENT)
Dept: GENERAL RADIOLOGY | Age: 59
End: 2024-02-16
Payer: MEDICARE

## 2024-02-16 ENCOUNTER — HOSPITAL ENCOUNTER (EMERGENCY)
Age: 59
Discharge: HOME | End: 2024-02-16
Payer: MEDICARE

## 2024-02-16 VITALS
OXYGEN SATURATION: 95 % | BODY MASS INDEX: 25.38 KG/M2 | HEIGHT: 75 IN | SYSTOLIC BLOOD PRESSURE: 188 MMHG | TEMPERATURE: 98.5 F | RESPIRATION RATE: 16 BRPM | WEIGHT: 204.15 LBS | DIASTOLIC BLOOD PRESSURE: 94 MMHG | HEART RATE: 96 BPM

## 2024-02-16 DIAGNOSIS — R07.9 CHEST PAIN, UNSPECIFIED TYPE: ICD-10-CM

## 2024-02-16 DIAGNOSIS — Z99.2 ESRD (END STAGE RENAL DISEASE) ON DIALYSIS (HCC): Primary | ICD-10-CM

## 2024-02-16 DIAGNOSIS — N18.6 ESRD (END STAGE RENAL DISEASE) ON DIALYSIS (HCC): Primary | ICD-10-CM

## 2024-02-16 DIAGNOSIS — J06.9 UPPER RESPIRATORY TRACT INFECTION, UNSPECIFIED TYPE: ICD-10-CM

## 2024-02-16 DIAGNOSIS — J40 BRONCHITIS: ICD-10-CM

## 2024-02-16 LAB
ALBUMIN SERPL BCG-MCNC: 3.8 G/DL (ref 3.5–5.1)
ALP SERPL-CCNC: 94 U/L (ref 38–126)
ALT SERPL W/O P-5'-P-CCNC: 10 U/L (ref 11–66)
ANION GAP SERPL CALC-SCNC: 11 MEQ/L (ref 8–16)
AST SERPL-CCNC: 30 U/L (ref 5–40)
BASOPHILS ABSOLUTE: 0 THOU/MM3 (ref 0–0.1)
BASOPHILS NFR BLD AUTO: 0.4 %
BILIRUB CONJ SERPL-MCNC: < 0.2 MG/DL (ref 0–0.3)
BILIRUB SERPL-MCNC: 0.3 MG/DL (ref 0.3–1.2)
BUN SERPL-MCNC: 36 MG/DL (ref 7–22)
CALCIUM SERPL-MCNC: 8 MG/DL (ref 8.5–10.5)
CHLORIDE SERPL-SCNC: 98 MEQ/L (ref 98–111)
CO2 SERPL-SCNC: 27 MEQ/L (ref 23–33)
CREAT SERPL-MCNC: 5.9 MG/DL (ref 0.4–1.2)
DEPRECATED RDW RBC AUTO: 56.3 FL (ref 35–45)
EKG ATRIAL RATE: 100 BPM
EKG P AXIS: 65 DEGREES
EKG P-R INTERVAL: 198 MS
EKG Q-T INTERVAL: 382 MS
EKG QRS DURATION: 104 MS
EKG QTC CALCULATION (BAZETT): 492 MS
EKG R AXIS: -35 DEGREES
EKG T AXIS: 92 DEGREES
EKG VENTRICULAR RATE: 100 BPM
EOSINOPHIL NFR BLD AUTO: 1.1 %
EOSINOPHILS ABSOLUTE: 0.1 THOU/MM3 (ref 0–0.4)
ERYTHROCYTE [DISTWIDTH] IN BLOOD BY AUTOMATED COUNT: 14.1 % (ref 11.5–14.5)
FLUAV RNA RESP QL NAA+PROBE: NOT DETECTED
FLUBV RNA RESP QL NAA+PROBE: NOT DETECTED
GFR SERPL CREATININE-BSD FRML MDRD: 10 ML/MIN/1.73M2
GLUCOSE SERPL-MCNC: 98 MG/DL (ref 70–108)
HCT VFR BLD AUTO: 31.4 % (ref 42–52)
HGB BLD-MCNC: 9.5 GM/DL (ref 14–18)
IMM GRANULOCYTES # BLD AUTO: 0.02 THOU/MM3 (ref 0–0.07)
IMM GRANULOCYTES NFR BLD AUTO: 0.4 %
LIPASE SERPL-CCNC: 79.2 U/L (ref 5.6–51.3)
LYMPHOCYTES ABSOLUTE: 0.6 THOU/MM3 (ref 1–4.8)
LYMPHOCYTES NFR BLD AUTO: 11.5 %
MAGNESIUM SERPL-MCNC: 2.2 MG/DL (ref 1.6–2.4)
MCH RBC QN AUTO: 32.9 PG (ref 26–33)
MCHC RBC AUTO-ENTMCNC: 30.3 GM/DL (ref 32.2–35.5)
MCV RBC AUTO: 108.7 FL (ref 80–94)
MONOCYTES ABSOLUTE: 0.6 THOU/MM3 (ref 0.4–1.3)
MONOCYTES NFR BLD AUTO: 10.2 %
NEUTROPHILS NFR BLD AUTO: 76.4 %
NRBC BLD AUTO-RTO: 0 /100 WBC
NT-PROBNP SERPL IA-MCNC: ABNORMAL PG/ML (ref 0–124)
OSMOLALITY SERPL CALC.SUM OF ELEC: 280.3 MOSMOL/KG (ref 275–300)
PLATELET # BLD AUTO: 128 THOU/MM3 (ref 130–400)
PMV BLD AUTO: 9.5 FL (ref 9.4–12.4)
POTASSIUM SERPL-SCNC: 5.3 MEQ/L (ref 3.5–5.2)
PROT SERPL-MCNC: 7.3 G/DL (ref 6.1–8)
RBC # BLD AUTO: 2.89 MILL/MM3 (ref 4.7–6.1)
SARS-COV-2 RNA RESP QL NAA+PROBE: NOT DETECTED
SEGMENTED NEUTROPHILS ABSOLUTE COUNT: 4.2 THOU/MM3 (ref 1.8–7.7)
SODIUM SERPL-SCNC: 136 MEQ/L (ref 135–145)
TROPONIN, HIGH SENSITIVITY: 102 NG/L (ref 0–12)
TROPONIN, HIGH SENSITIVITY: 103 NG/L (ref 0–12)
TROPONIN, HIGH SENSITIVITY: 97 NG/L (ref 0–12)
WBC # BLD AUTO: 5.5 THOU/MM3 (ref 4.8–10.8)

## 2024-02-16 PROCEDURE — 71045 X-RAY EXAM CHEST 1 VIEW: CPT

## 2024-02-16 PROCEDURE — 6370000000 HC RX 637 (ALT 250 FOR IP): Performed by: NURSE PRACTITIONER

## 2024-02-16 PROCEDURE — 82248 BILIRUBIN DIRECT: CPT

## 2024-02-16 PROCEDURE — 83735 ASSAY OF MAGNESIUM: CPT

## 2024-02-16 PROCEDURE — 87636 SARSCOV2 & INF A&B AMP PRB: CPT

## 2024-02-16 PROCEDURE — 94640 AIRWAY INHALATION TREATMENT: CPT

## 2024-02-16 PROCEDURE — 85025 COMPLETE CBC W/AUTO DIFF WBC: CPT

## 2024-02-16 PROCEDURE — 99285 EMERGENCY DEPT VISIT HI MDM: CPT

## 2024-02-16 PROCEDURE — 80053 COMPREHEN METABOLIC PANEL: CPT

## 2024-02-16 PROCEDURE — 93005 ELECTROCARDIOGRAM TRACING: CPT | Performed by: NURSE PRACTITIONER

## 2024-02-16 PROCEDURE — 83880 ASSAY OF NATRIURETIC PEPTIDE: CPT

## 2024-02-16 PROCEDURE — 36415 COLL VENOUS BLD VENIPUNCTURE: CPT

## 2024-02-16 PROCEDURE — 84484 ASSAY OF TROPONIN QUANT: CPT

## 2024-02-16 PROCEDURE — 93010 ELECTROCARDIOGRAM REPORT: CPT | Performed by: INTERNAL MEDICINE

## 2024-02-16 PROCEDURE — 83690 ASSAY OF LIPASE: CPT

## 2024-02-16 RX ORDER — IPRATROPIUM BROMIDE AND ALBUTEROL SULFATE 2.5; .5 MG/3ML; MG/3ML
1 SOLUTION RESPIRATORY (INHALATION) ONCE
Status: COMPLETED | OUTPATIENT
Start: 2024-02-16 | End: 2024-02-16

## 2024-02-16 RX ORDER — ONDANSETRON 2 MG/ML
4 INJECTION INTRAMUSCULAR; INTRAVENOUS ONCE
Status: DISCONTINUED | OUTPATIENT
Start: 2024-02-16 | End: 2024-02-16

## 2024-02-16 RX ORDER — PREDNISONE 50 MG/1
50 TABLET ORAL DAILY
Qty: 5 TABLET | Refills: 0 | Status: SHIPPED | OUTPATIENT
Start: 2024-02-16 | End: 2024-02-16

## 2024-02-16 RX ORDER — AMOXICILLIN AND CLAVULANATE POTASSIUM 875; 125 MG/1; MG/1
1 TABLET, FILM COATED ORAL 2 TIMES DAILY
Qty: 14 TABLET | Refills: 0 | Status: SHIPPED | OUTPATIENT
Start: 2024-02-16 | End: 2024-02-23

## 2024-02-16 RX ORDER — AMOXICILLIN AND CLAVULANATE POTASSIUM 875; 125 MG/1; MG/1
1 TABLET, FILM COATED ORAL 2 TIMES DAILY
Qty: 14 TABLET | Refills: 0 | Status: SHIPPED | OUTPATIENT
Start: 2024-02-16 | End: 2024-02-16

## 2024-02-16 RX ORDER — PREDNISONE 20 MG/1
40 TABLET ORAL ONCE
Status: COMPLETED | OUTPATIENT
Start: 2024-02-16 | End: 2024-02-16

## 2024-02-16 RX ORDER — AZITHROMYCIN 250 MG/1
TABLET, FILM COATED ORAL
Qty: 6 TABLET | Refills: 0 | Status: SHIPPED | OUTPATIENT
Start: 2024-02-16 | End: 2024-02-16

## 2024-02-16 RX ORDER — ONDANSETRON 4 MG/1
4 TABLET, ORALLY DISINTEGRATING ORAL ONCE
Status: COMPLETED | OUTPATIENT
Start: 2024-02-16 | End: 2024-02-16

## 2024-02-16 RX ORDER — PREDNISONE 50 MG/1
50 TABLET ORAL DAILY
Qty: 5 TABLET | Refills: 0 | Status: SHIPPED | OUTPATIENT
Start: 2024-02-16 | End: 2024-02-21

## 2024-02-16 RX ADMIN — PREDNISONE 40 MG: 20 TABLET ORAL at 07:27

## 2024-02-16 RX ADMIN — IPRATROPIUM BROMIDE AND ALBUTEROL SULFATE 1 DOSE: .5; 3 SOLUTION RESPIRATORY (INHALATION) at 07:17

## 2024-02-16 RX ADMIN — ONDANSETRON 4 MG: 4 TABLET, ORALLY DISINTEGRATING ORAL at 07:27

## 2024-02-16 ASSESSMENT — PAIN DESCRIPTION - FREQUENCY: FREQUENCY: CONTINUOUS

## 2024-02-16 ASSESSMENT — PAIN DESCRIPTION - PAIN TYPE: TYPE: ACUTE PAIN

## 2024-02-16 ASSESSMENT — PAIN - FUNCTIONAL ASSESSMENT: PAIN_FUNCTIONAL_ASSESSMENT: 0-10

## 2024-02-16 ASSESSMENT — PAIN SCALES - GENERAL: PAINLEVEL_OUTOF10: 7

## 2024-02-16 ASSESSMENT — PAIN DESCRIPTION - ORIENTATION: ORIENTATION: MID

## 2024-02-16 ASSESSMENT — PAIN DESCRIPTION - LOCATION: LOCATION: CHEST

## 2024-02-16 NOTE — DISCHARGE INSTRUCTIONS
Make sure you are following your kidney protocol.  Return if you note worsening chest pain or shortness of breath.  Follow-up with cardiology.  Take all of your medications as directed.  Finish all antibiotic    It is very important that you return if you have any worsening symptoms.  Continue with your dialysis as directed

## 2024-02-16 NOTE — ED PROVIDER NOTES
[facility] EMERGENCY DEPT      EMERGENCY MEDICINE     Pt Name: Trav Jennings  MRN: [id number]  Birthdate 1965  Date of evaluation: 2/16/2024  Provider: WADE Brambila CNP    CHIEF COMPLAINT       Chief Complaint   Patient presents with    Chest Pain    Cough     HISTORY OF PRESENT ILLNESS   Trav Jennings is a pleasant 59 y.o. male who presents to the emergency department from home with c/o chest pain, sob, cough, congestion that started yesterday.  Patient is a T/TH/S dialysis.        History is obtained from:  {HISTORY SOURCE:[id number]}  PASTMEDICAL HISTORY     Past Medical History:   Diagnosis Date    AAA (abdominal aortic aneurysm) ([facility])     NURIS (acute kidney injury) ([facility]) 9/24/2015    Anemia associated with chronic renal failure     Arthritis     stated in hands    Cocaine abuse ([facility]) 5/10/2014    Depression     Diabetes mellitus ([facility])     pt states he no longer has diabetes he has lost alot of davion.     FSGS (focal segmental glomerulosclerosis) 5/23/2013    Hemodialysis patient ([facility]) 10/17/2016    on hemodialysis with [facility]    Hemorrhoids 1/16/2012    History of blood transfusion     Hyperlipidemia     Hyperphosphatemia 5/21/2016    Hypertension     Left renal artery stenosis ([facility]) 5/22/2014    Monoclonal (M) protein disease, multiple 'M' protein     Nicotine dependence 6/16/2014    Noncompliance     Pneumonia     Psychiatric problem     PTSD (post-traumatic stress disorder)     Pt vet from [place]    Secondary hyperparathyroidism (of renal origin)     Sleep apnea        Patient Active Problem List   Diagnosis Code    FSGS (focal segmental glomerulosclerosis) N05.1    Diabetes mellitus type 2, controlled ([facility]) E11.9    Monoclonal (M) protein disease, multiple 'M' protein D47.2    Depression F32.A    PTSD (post-traumatic stress disorder) F43.10    Secondary renal hyperparathyroidism ([facility]) N25.81    Cocaine use F14.90    Substance induced mood disorder ([facility])  VASCULAR SURGERY Left 07/08/2016    AV Fistula    VASCULAR SURGERY Right 1990    Surgery on Achilles tendon       CURRENT MEDICATIONS       Discharge Medication List as of 2/16/2024  6:58 AM        CONTINUE these medications which have NOT CHANGED    Details   hydrALAZINE (APRESOLINE) 50 MG tablet Take 1 tablet by mouth every 8 hours, Disp-90 tablet, R-3Normal      isosorbide mononitrate (IMDUR) 120 MG extended release tablet Take 1 tablet by mouth in the morning and at bedtime, Disp-30 tablet, R-3Normal      atorvastatin (LIPITOR) 40 MG tablet Take 1 tablet by mouth nightly, Disp-30 tablet, R-3Normal      cloNIDine (CATAPRES) 0.3 MG tablet Take 1 tablet by mouth 3 times daily, Disp-60 tablet, R-3Normal      verapamil (CALAN SR) 240 MG extended release tablet Take 1 tablet by mouth daily, Disp-30 tablet, R-3Normal      ferrous sulfate (IRON 325) 325 (65 Fe) MG tablet Take 1 tablet by mouth 3 times daily (with meals), Disp-30 tablet, R-3Normal      folic acid (FOLVITE) 1 MG tablet Take 1 tablet by mouth daily, Disp-30 tablet, R-3Normal      calcitRIOL (ROCALTROL) 0.5 MCG capsule Take 1 capsule by mouth three times a week, Disp-30 capsule, R-3Normal      cinacalcet (SENSIPAR) 30 MG tablet Take 5 tablets by mouth three times a week, Disp-30 tablet, R-3Normal      Sucroferric Oxyhydroxide (VELPHORO) 500 MG CHEW Take 1 tablet by mouth 4 times daily (before meals and nightly) Take with meals and snacksHistorical Med      Multiple Vitamins-Minerals (THERAPEUTIC MULTIVITAMIN-MINERALS) tablet Take 1 tablet by mouth dailyHistorical Med      vitamin E 400 UNIT capsule Take 1 capsule by mouth dailyHistorical Med      B Complex-C-Zn-Folic Acid (DIALYVITE 800-ZINC 15 PO) Take 1 tablet by mouth dailyHistorical Med      vitamin D (CHOLECALCIFEROL) 25 MCG (1000 UT) TABS tablet Take 1 tablet by mouth 2 times daily Take two tablets twice a dayHistorical Med      famotidine (PEPCID) 20 MG tablet Take 1 tablet by mouth daily, Disp-60

## 2024-02-16 NOTE — ED NOTES
Pt handoff report received from GARLAND Gonzales at bedside. Pt given discharge paperwork at this time. Pt discharges to lobby for cab. VSS no acute distress noted.

## 2024-02-16 NOTE — ED TRIAGE NOTES
Patient to ED via intake for chest pain, SOB and cough that started yesterday. Pt is dialysis pt and had dialysis yesterday. States he has been having hot/cold chills, cough and feels congestion in his chest.

## 2024-02-22 LAB
EKG ATRIAL RATE: 100 BPM
EKG P AXIS: 65 DEGREES
EKG P-R INTERVAL: 198 MS
EKG Q-T INTERVAL: 382 MS
EKG QRS DURATION: 104 MS
EKG QTC CALCULATION (BAZETT): 492 MS
EKG R AXIS: -35 DEGREES
EKG T AXIS: 92 DEGREES
EKG VENTRICULAR RATE: 100 BPM

## 2024-02-26 ENCOUNTER — APPOINTMENT (OUTPATIENT)
Dept: GENERAL RADIOLOGY | Age: 59
End: 2024-02-26
Payer: MEDICARE

## 2024-02-26 ENCOUNTER — HOSPITAL ENCOUNTER (INPATIENT)
Age: 59
LOS: 11 days | Discharge: HOME | End: 2024-03-08
Attending: EMERGENCY MEDICINE
Payer: MEDICARE

## 2024-02-26 DIAGNOSIS — E87.70 HYPERVOLEMIA, UNSPECIFIED HYPERVOLEMIA TYPE: ICD-10-CM

## 2024-02-26 DIAGNOSIS — R79.89 ELEVATED TROPONIN: ICD-10-CM

## 2024-02-26 DIAGNOSIS — R79.89 ELEVATED BRAIN NATRIURETIC PEPTIDE (BNP) LEVEL: ICD-10-CM

## 2024-02-26 DIAGNOSIS — E87.5 HYPERKALEMIA: Primary | ICD-10-CM

## 2024-02-26 DIAGNOSIS — I50.9 ACUTE ON CHRONIC CONGESTIVE HEART FAILURE, UNSPECIFIED HEART FAILURE TYPE (HCC): ICD-10-CM

## 2024-02-26 LAB
ANION GAP SERPL CALC-SCNC: 18 MEQ/L (ref 8–16)
BASOPHILS ABSOLUTE: 0 THOU/MM3 (ref 0–0.1)
BASOPHILS NFR BLD AUTO: 0.5 %
BUN SERPL-MCNC: 78 MG/DL (ref 7–22)
CALCIUM SERPL-MCNC: 8.7 MG/DL (ref 8.5–10.5)
CHLORIDE SERPL-SCNC: 99 MEQ/L (ref 98–111)
CO2 SERPL-SCNC: 23 MEQ/L (ref 23–33)
CREAT SERPL-MCNC: 8.8 MG/DL (ref 0.4–1.2)
DEPRECATED RDW RBC AUTO: 58.9 FL (ref 35–45)
EKG ATRIAL RATE: 99 BPM
EKG P AXIS: 71 DEGREES
EKG P-R INTERVAL: 188 MS
EKG Q-T INTERVAL: 388 MS
EKG QRS DURATION: 106 MS
EKG QTC CALCULATION (BAZETT): 497 MS
EKG R AXIS: -40 DEGREES
EKG T AXIS: 92 DEGREES
EKG VENTRICULAR RATE: 99 BPM
EOSINOPHIL NFR BLD AUTO: 1.5 %
EOSINOPHILS ABSOLUTE: 0.1 THOU/MM3 (ref 0–0.4)
ERYTHROCYTE [DISTWIDTH] IN BLOOD BY AUTOMATED COUNT: 14.8 % (ref 11.5–14.5)
FLUAV RNA RESP QL NAA+PROBE: NOT DETECTED
FLUBV RNA RESP QL NAA+PROBE: NOT DETECTED
GFR SERPL CREATININE-BSD FRML MDRD: 6 ML/MIN/1.73M2
GLUCOSE SERPL-MCNC: 85 MG/DL (ref 70–108)
HCT VFR BLD AUTO: 34.4 % (ref 42–52)
HGB BLD-MCNC: 10.3 GM/DL (ref 14–18)
IMM GRANULOCYTES # BLD AUTO: 0.02 THOU/MM3 (ref 0–0.07)
IMM GRANULOCYTES NFR BLD AUTO: 0.3 %
LYMPHOCYTES ABSOLUTE: 0.6 THOU/MM3 (ref 1–4.8)
LYMPHOCYTES NFR BLD AUTO: 8.7 %
MCH RBC QN AUTO: 32.7 PG (ref 26–33)
MCHC RBC AUTO-ENTMCNC: 29.9 GM/DL (ref 32.2–35.5)
MCV RBC AUTO: 109.2 FL (ref 80–94)
MONOCYTES ABSOLUTE: 0.4 THOU/MM3 (ref 0.4–1.3)
MONOCYTES NFR BLD AUTO: 6.7 %
NEUTROPHILS NFR BLD AUTO: 82.3 %
NRBC BLD AUTO-RTO: 0 /100 WBC
NT-PROBNP SERPL IA-MCNC: ABNORMAL PG/ML (ref 0–124)
OSMOLALITY SERPL CALC.SUM OF ELEC: 302 MOSMOL/KG (ref 275–300)
PLATELET # BLD AUTO: 153 THOU/MM3 (ref 130–400)
PMV BLD AUTO: 10.1 FL (ref 9.4–12.4)
POTASSIUM SERPL-SCNC: 6.3 MEQ/L (ref 3.5–5.2)
RBC # BLD AUTO: 3.15 MILL/MM3 (ref 4.7–6.1)
SARS-COV-2 RNA RESP QL NAA+PROBE: NOT DETECTED
SEGMENTED NEUTROPHILS ABSOLUTE COUNT: 5.4 THOU/MM3 (ref 1.8–7.7)
SODIUM SERPL-SCNC: 140 MEQ/L (ref 135–145)
TROPONIN, HIGH SENSITIVITY: 103 NG/L (ref 0–12)
WBC # BLD AUTO: 6.6 THOU/MM3 (ref 4.8–10.8)

## 2024-02-26 PROCEDURE — 6370000000 HC RX 637 (ALT 250 FOR IP): Performed by: NURSE PRACTITIONER

## 2024-02-26 PROCEDURE — 2700000000 HC OXYGEN THERAPY PER DAY

## 2024-02-26 PROCEDURE — 94761 N-INVAS EAR/PLS OXIMETRY MLT: CPT

## 2024-02-26 PROCEDURE — 2060000000 HC ICU INTERMEDIATE R&B

## 2024-02-26 PROCEDURE — 6360000002 HC RX W HCPCS: Performed by: NURSE PRACTITIONER

## 2024-02-26 PROCEDURE — 85025 COMPLETE CBC W/AUTO DIFF WBC: CPT

## 2024-02-26 PROCEDURE — 93010 ELECTROCARDIOGRAM REPORT: CPT | Performed by: INTERNAL MEDICINE

## 2024-02-26 PROCEDURE — 90935 HEMODIALYSIS ONE EVALUATION: CPT | Performed by: INTERNAL MEDICINE

## 2024-02-26 PROCEDURE — 87636 SARSCOV2 & INF A&B AMP PRB: CPT

## 2024-02-26 PROCEDURE — 80048 BASIC METABOLIC PNL TOTAL CA: CPT

## 2024-02-26 PROCEDURE — 93005 ELECTROCARDIOGRAM TRACING: CPT | Performed by: EMERGENCY MEDICINE

## 2024-02-26 PROCEDURE — 99285 EMERGENCY DEPT VISIT HI MDM: CPT

## 2024-02-26 PROCEDURE — 36415 COLL VENOUS BLD VENIPUNCTURE: CPT

## 2024-02-26 PROCEDURE — 99223 1ST HOSP IP/OBS HIGH 75: CPT | Performed by: NURSE PRACTITIONER

## 2024-02-26 PROCEDURE — 84484 ASSAY OF TROPONIN QUANT: CPT

## 2024-02-26 PROCEDURE — 71045 X-RAY EXAM CHEST 1 VIEW: CPT

## 2024-02-26 PROCEDURE — 83880 ASSAY OF NATRIURETIC PEPTIDE: CPT

## 2024-02-26 PROCEDURE — 99223 1ST HOSP IP/OBS HIGH 75: CPT | Performed by: INTERNAL MEDICINE

## 2024-02-26 RX ORDER — ONDANSETRON 2 MG/ML
4 INJECTION INTRAMUSCULAR; INTRAVENOUS EVERY 6 HOURS PRN
Status: DISCONTINUED | OUTPATIENT
Start: 2024-02-26 | End: 2024-03-08 | Stop reason: HOSPADM

## 2024-02-26 RX ORDER — SODIUM CHLORIDE 0.9 % (FLUSH) 0.9 %
5-40 SYRINGE (ML) INJECTION PRN
Status: DISCONTINUED | OUTPATIENT
Start: 2024-02-26 | End: 2024-03-08 | Stop reason: HOSPADM

## 2024-02-26 RX ORDER — CALCIUM GLUCONATE 10 MG/ML
1000 INJECTION, SOLUTION INTRAVENOUS ONCE
Status: DISCONTINUED | OUTPATIENT
Start: 2024-02-26 | End: 2024-02-26

## 2024-02-26 RX ORDER — ATORVASTATIN CALCIUM 40 MG/1
40 TABLET, FILM COATED ORAL NIGHTLY
Status: DISCONTINUED | OUTPATIENT
Start: 2024-02-26 | End: 2024-03-08 | Stop reason: HOSPADM

## 2024-02-26 RX ORDER — HYDRALAZINE HYDROCHLORIDE 50 MG/1
50 TABLET, FILM COATED ORAL EVERY 8 HOURS SCHEDULED
Status: DISCONTINUED | OUTPATIENT
Start: 2024-02-26 | End: 2024-03-02

## 2024-02-26 RX ORDER — CALCITRIOL 0.25 UG/1
0.5 CAPSULE, LIQUID FILLED ORAL
Status: DISCONTINUED | OUTPATIENT
Start: 2024-02-27 | End: 2024-03-08 | Stop reason: HOSPADM

## 2024-02-26 RX ORDER — CINACALCET 30 MG/1
150 TABLET, FILM COATED ORAL
Status: DISCONTINUED | OUTPATIENT
Start: 2024-02-27 | End: 2024-03-08 | Stop reason: HOSPADM

## 2024-02-26 RX ORDER — FERROUS SULFATE 325(65) MG
325 TABLET ORAL
Status: DISCONTINUED | OUTPATIENT
Start: 2024-02-26 | End: 2024-03-08 | Stop reason: HOSPADM

## 2024-02-26 RX ORDER — ONDANSETRON 4 MG/1
4 TABLET, ORALLY DISINTEGRATING ORAL EVERY 8 HOURS PRN
Status: DISCONTINUED | OUTPATIENT
Start: 2024-02-26 | End: 2024-03-08 | Stop reason: HOSPADM

## 2024-02-26 RX ORDER — SODIUM CHLORIDE 0.9 % (FLUSH) 0.9 %
5-40 SYRINGE (ML) INJECTION EVERY 12 HOURS SCHEDULED
Status: DISCONTINUED | OUTPATIENT
Start: 2024-02-26 | End: 2024-03-08 | Stop reason: HOSPADM

## 2024-02-26 RX ORDER — HYDRALAZINE HYDROCHLORIDE 20 MG/ML
10 INJECTION INTRAMUSCULAR; INTRAVENOUS EVERY 6 HOURS PRN
Status: DISCONTINUED | OUTPATIENT
Start: 2024-02-26 | End: 2024-03-08 | Stop reason: HOSPADM

## 2024-02-26 RX ORDER — FOLIC ACID 1 MG/1
1 TABLET ORAL DAILY
Status: DISCONTINUED | OUTPATIENT
Start: 2024-02-26 | End: 2024-03-08 | Stop reason: HOSPADM

## 2024-02-26 RX ORDER — FAMOTIDINE 20 MG/1
20 TABLET, FILM COATED ORAL DAILY
Status: DISCONTINUED | OUTPATIENT
Start: 2024-02-26 | End: 2024-03-08 | Stop reason: HOSPADM

## 2024-02-26 RX ORDER — ASPIRIN 81 MG/1
81 TABLET ORAL DAILY
Status: DISCONTINUED | OUTPATIENT
Start: 2024-02-26 | End: 2024-03-08 | Stop reason: HOSPADM

## 2024-02-26 RX ORDER — VERAPAMIL HYDROCHLORIDE 240 MG/1
240 TABLET, FILM COATED, EXTENDED RELEASE ORAL DAILY
Status: DISCONTINUED | OUTPATIENT
Start: 2024-02-26 | End: 2024-03-08 | Stop reason: HOSPADM

## 2024-02-26 RX ORDER — SERTRALINE HYDROCHLORIDE 100 MG/1
100 TABLET, FILM COATED ORAL DAILY
Status: DISCONTINUED | OUTPATIENT
Start: 2024-02-26 | End: 2024-03-08 | Stop reason: HOSPADM

## 2024-02-26 RX ORDER — ACETAMINOPHEN 325 MG/1
650 TABLET ORAL EVERY 6 HOURS PRN
Status: DISCONTINUED | OUTPATIENT
Start: 2024-02-26 | End: 2024-03-08 | Stop reason: HOSPADM

## 2024-02-26 RX ORDER — NICOTINE 21 MG/24HR
1 PATCH, TRANSDERMAL 24 HOURS TRANSDERMAL EVERY 24 HOURS
Status: DISCONTINUED | OUTPATIENT
Start: 2024-02-26 | End: 2024-03-08 | Stop reason: HOSPADM

## 2024-02-26 RX ORDER — SODIUM CHLORIDE 9 MG/ML
INJECTION, SOLUTION INTRAVENOUS PRN
Status: DISCONTINUED | OUTPATIENT
Start: 2024-02-26 | End: 2024-03-08 | Stop reason: HOSPADM

## 2024-02-26 RX ORDER — ISOSORBIDE MONONITRATE 60 MG/1
120 TABLET, EXTENDED RELEASE ORAL 2 TIMES DAILY
Status: DISCONTINUED | OUTPATIENT
Start: 2024-02-26 | End: 2024-03-08 | Stop reason: HOSPADM

## 2024-02-26 RX ORDER — TRAZODONE HYDROCHLORIDE 50 MG/1
50 TABLET ORAL NIGHTLY PRN
Status: DISCONTINUED | OUTPATIENT
Start: 2024-02-26 | End: 2024-03-08 | Stop reason: HOSPADM

## 2024-02-26 RX ORDER — HEPARIN SODIUM 5000 [USP'U]/ML
5000 INJECTION, SOLUTION INTRAVENOUS; SUBCUTANEOUS EVERY 8 HOURS SCHEDULED
Status: DISCONTINUED | OUTPATIENT
Start: 2024-02-26 | End: 2024-03-08 | Stop reason: HOSPADM

## 2024-02-26 RX ORDER — ALBUTEROL SULFATE 2.5 MG/3ML
2.5 SOLUTION RESPIRATORY (INHALATION) ONCE
Status: DISCONTINUED | OUTPATIENT
Start: 2024-02-26 | End: 2024-03-08 | Stop reason: HOSPADM

## 2024-02-26 RX ORDER — ACETAMINOPHEN 650 MG/1
650 SUPPOSITORY RECTAL EVERY 6 HOURS PRN
Status: DISCONTINUED | OUTPATIENT
Start: 2024-02-26 | End: 2024-03-08 | Stop reason: HOSPADM

## 2024-02-26 RX ADMIN — FERROUS SULFATE TAB 325 MG (65 MG ELEMENTAL FE) 325 MG: 325 (65 FE) TAB at 22:36

## 2024-02-26 RX ADMIN — FOLIC ACID 1 MG: 1 TABLET ORAL at 22:37

## 2024-02-26 RX ADMIN — CLONIDINE HYDROCHLORIDE 0.3 MG: 0.2 TABLET ORAL at 15:04

## 2024-02-26 RX ADMIN — SERTRALINE 100 MG: 100 TABLET, FILM COATED ORAL at 22:37

## 2024-02-26 RX ADMIN — ATORVASTATIN CALCIUM 40 MG: 40 TABLET, FILM COATED ORAL at 22:36

## 2024-02-26 RX ADMIN — HEPARIN SODIUM 5000 UNITS: 5000 INJECTION INTRAVENOUS; SUBCUTANEOUS at 20:46

## 2024-02-26 RX ADMIN — VERAPAMIL HYDROCHLORIDE 240 MG: 240 TABLET, FILM COATED, EXTENDED RELEASE ORAL at 15:05

## 2024-02-26 RX ADMIN — FAMOTIDINE 20 MG: 20 TABLET, FILM COATED ORAL at 20:45

## 2024-02-26 RX ADMIN — HYDRALAZINE HYDROCHLORIDE 50 MG: 50 TABLET ORAL at 15:05

## 2024-02-26 RX ADMIN — ASPIRIN 81 MG: 81 TABLET, COATED ORAL at 20:45

## 2024-02-26 RX ADMIN — ISOSORBIDE MONONITRATE 120 MG: 60 TABLET, EXTENDED RELEASE ORAL at 22:36

## 2024-02-26 ASSESSMENT — LIFESTYLE VARIABLES
HOW OFTEN DO YOU HAVE A DRINK CONTAINING ALCOHOL: MONTHLY OR LESS
HOW MANY STANDARD DRINKS CONTAINING ALCOHOL DO YOU HAVE ON A TYPICAL DAY: 1 OR 2

## 2024-02-26 NOTE — CONSULTS
[facility]          [address]        [address]        [place], [place] [id number] [phone]           Inpatient Initial consult note         2/26/2024 3:41 PM      Patient Name:   Trav Jennings  YOB: 1965  Primary Care Physician:  Radha Hou APRN - CNP   Admit Date:    2/26/2024 12:25 PM    Consultation requested by : Kimberley Vance APRN*    Reason for Consult : Nephrology following for ESRD on hemodialysis hyperkalemia and fluid overload.    History of presenting illness :Trav Jennings is a 59 y.o.   male with Past Medical History as stated below presented with a chief complaint of Shortness of Breath   on 2/26/2024 .    Patient presented with chief complaints of shortness of breath patient states that he started to feel after his dialysis on Saturday and has continued to get worse associated with feeling lightheaded and dizzy he has been having cough for at least for the last 3 weeks.  Associated phlegm chest pain as well.  Patient says he has been noncompliant with his fluid intake over the weekend and has been drinking a lot of protein shakes.    Upon arrival to the ED patient was extremely diaphoretic potassium was 6.3 proBNP was 37,000 initially his saturations were low and has required almost 10 L of oxygen and he was subsequently transferred to high flow nasal cannula.  Chest x-ray showed some cardiomegaly and pleural effusions.  His blood pressure was also extremely elevated.    Past History:  Past Medical History:   Diagnosis Date    AAA (abdominal aortic aneurysm) ([facility])     NURIS (acute kidney injury) ([facility]) 9/24/2015    Anemia associated with chronic renal failure     Arthritis     stated in hands    Cocaine abuse ([facility]) 5/10/2014    Depression     Diabetes mellitus ([facility])     pt states he no longer has diabetes he has lost alot of davion.     FSGS (focal segmental glomerulosclerosis) 5/23/2013    Hemodialysis patient ([facility]) 10/17/2016    on  activity: Yes     Partners: Female   Other Topics Concern    Not on file   Social History Narrative    Not on file     Social Determinants of Health     Financial Resource Strain: Not on file   Food Insecurity: Food Insecurity Present (2/4/2024)    Hunger Vital Sign     Worried About Running Out of Food in the Last Year: Sometimes true     Ran Out of Food in the Last Year: Sometimes true   Transportation Needs: Unmet Transportation Needs (2/4/2024)    PRAPARE - Transportation     Lack of Transportation (Medical): Yes     Lack of Transportation (Non-Medical): Yes   Physical Activity: Not on file   Stress: Not on file   Social Connections: Not on file   Intimate Partner Violence: Not on file   Housing Stability: High Risk (2/4/2024)    Housing Stability Vital Sign     Unable to Pay for Housing in the Last Year: Yes     Number of Places Lived in the Last Year: 1     Unstable Housing in the Last Year: No     Family History   Problem Relation Age of Onset    Cancer Mother     Other Brother         aneurysm        Medications & Allergies :  Prior to Admission medications    Medication Sig Start Date End Date Taking? Authorizing Provider   hydrALAZINE (APRESOLINE) 50 MG tablet Take 1 tablet by mouth every 8 hours 9/12/23   Pualie Gay MD   isosorbide mononitrate (IMDUR) 120 MG extended release tablet Take 1 tablet by mouth in the morning and at bedtime 5/25/23   Jimmy Castillo DO   atorvastatin (LIPITOR) 40 MG tablet Take 1 tablet by mouth nightly 5/25/23   Jimmy Castillo DO   cloNIDine (CATAPRES) 0.3 MG tablet Take 1 tablet by mouth 3 times daily 5/25/23   Jimmy Castillo DO   verapamil (CALAN SR) 240 MG extended release tablet Take 1 tablet by mouth daily 5/25/23   Jimmy Castillo DO   ferrous sulfate (IRON 325) 325 (65 Fe) MG tablet Take 1 tablet by mouth 3 times daily (with meals) 5/25/23   Jimmy Castillo DO   folic acid (FOLVITE) 1 MG tablet Take 1 tablet by mouth daily 5/25/23   Jimmy Castillo DO   calcitRIOL (ROCALTROL) 0.5 MCG

## 2024-02-26 NOTE — H&P
History & Physical        Patient:  Trav Jennings  YOB: 1965    MRN: [id number]     Acct: [id number]    PCP: Radha Hou APRN - CNP    Date of Admission: 2/26/2024    Date of Service: Pt seen/examined on 02/26/24  and Admitted to Inpatient with expected LOS greater than two midnights due to medical therapy.     ASSESSMENT/PLAN:    Volume Overload--BNP noted to be 37,826; nephrology has been consulted and plan for HD today, did have HD as scheduled on 2/24  Hyperkalemia--calcium gluconate 1000 mg IV x 1 dose along with albuterol nebulizer 2.5 mg ordered in the ER; likely will benefit from HD today also  ESRD on HD--on Tuesday/Thursday/Saturday; nephrology has been consulted and planning HD today  Primary hypertension, uncontrolled--on Catapres, hydralazine, Imdur, verapamil; will add hydralazine 10 mg IV push every 6 hours as needed systolic blood pressure greater than 170, monitor  Small bilateral pleural effusions--needs HD treatment, monitor, on his baseline oxygen needs  Obstructive hypertrophic cardiomyopathy secondary to uncontrolled hypertension--echo from May 24, 2023 revealed an EF 65 to 70% with marked LVOT obstruction  Diabetes mellitus type 2, controlled--hemoglobin 5.5 on December 25, 2023  Hyperlipidemia--on statin  Chronic AAA--follows with vascular surgery in [place]  History of cocaine use--denies recent use  Secondary hyperparathyroidism--on calcitriol  Probable REZA--definitely needs outpatient sleep follow-up  Chronic hypoxic respiratory failure--on 3 L of oxygen around-the-clock; has his portable tank at the bedside  Tobacco abuse--cessation counseling; NicoDerm 14 mg topically daily    Chief Complaint: Shortness of breath    History Of Present Illness:    59 y.o. male who presented to [facility] with shortness of breath; patient has a past medical history of end-stage renal disease on hemodialysis, obstructive hypertrophic cardiomyopathy, hypertension,  bloody stools  Genito-Urinary ROS: denies dysuria, frequency, urgency; denies hematuria  Musculoskeletal ROS: negative  Neurological ROS: no syncope, no seizures, no numbness or tingling of hands, no numbness or tingling of feet, no paresis  Dermatology: no skin rash, no eczema  Endocrine: no polyuria, polydypsia, no heat/cold intolerance  Hematology: denies bruising easily, denies bleeding problems, denies clotting disorders    PHYSICAL EXAM:    BP (!) 121/108   Pulse 94   Temp 98.2 °F (36.8 °C) (Oral)   Resp 23   Ht 1.905 m (6' 3\")   Wt 94.6 kg (208 lb 8.9 oz)   SpO2 96%   BMI 26.07 kg/m²     General appearance:  No apparent distress, appears stated age and cooperative.  HEENT:  Normal cephalic, atraumatic without obvious deformity. Pupils equal, round, and reactive to light.   Conjunctivae/corneas clear.  Neck: Supple, with full range of motion. No jugular venous distention. Trachea midline.  Respiratory:  Normal respiratory effort. Clear to auscultation, bilaterally without Rales/Wheezes/Rhonchi.  Cardiovascular:  Regular rate and rhythm with normal S1/S2 without murmurs, rubs or gallops.  Abdomen: Soft, non-tender, non-distended with normal bowel sounds.  Musculoskeletal:  No clubbing, cyanosis or edema bilaterally.  Full range of motion without deformity.  Skin: Skin color, texture, turgor normal.    Neurologic:  Neurovascularly intact without any focal sensory/motor deficits. Cranial nerves: II-XII intact, grossly non-focal.  Psychiatric:  Alert and oriented, thought content appropriate  Capillary Refill: Brisk,< 3 seconds   Peripheral Pulses: +2 palpable, equal bilaterally       Labs:     Recent Labs     02/26/24  1255   WBC 6.6   HGB 10.3*   HCT 34.4*        Recent Labs     02/26/24  1255      K 6.3*   CL 99   CO2 23   BUN 78*   CREATININE 8.8*   CALCIUM 8.7     Radiology:     XR CHEST PORTABLE    Result Date: 2/26/2024  PROCEDURE: XR CHEST PORTABLE CLINICAL INFORMATION: Shortness of

## 2024-02-26 NOTE — PLAN OF CARE
Received a call from hemodialysis regarding patient's hypertension, asked him to please give clonidine 0.3 mg x 1 dose, added IV hydralazine 10 mg IV push every 6 hours as needed systolic blood pressure greater than 170

## 2024-02-26 NOTE — CONSULTS
Patient seen and examined by me during hemodialysis  Patient was saturating only 90% on 10 L.  Transition to high flow nasal cannula  Currently appears to be feeling better  Seen during dialysis, blood pressure 214/121 UF 4900 mL blood flow rate of 450 venous pressure of 230  We will schedule him for dialysis again tomorrow  Patient admits to noncompliance with fluid intake over the weekend  Full consult to follow    Ottoniel Pruett MD

## 2024-02-26 NOTE — ED TRIAGE NOTES
Pt to ED via private vehicle w/rprts of increased SOB ad increased lower extremity swelling. Pt rprts dialysis Tuesday, Thursday and Saturday w/o difficulty. States yesterday began to experience SOB. States needing a new round of dialysis but unable to get in today. Pt is A&O x4. Labored breathing w/minimal exertion. Tripod position. Placed on cardiac monitor. EKG obtained.

## 2024-02-26 NOTE — FLOWSHEET NOTE
02/26/24 1846   Vital Signs   BP (!) 182/99   Temp 98.4 °F (36.9 °C)   Pulse 91   Respirations 14   SpO2 100 %   Pain Assessment   Pain Assessment None - Denies Pain   Post-Hemodialysis Assessment   Post-Treatment Procedures Blood returned;Access bleeding time < 10 minutes   Machine Disinfection Process Acid/Vinegar Clean;Heat Disinfect;Exterior Machine Disinfection   Rinseback Volume (ml) 400 ml   Blood Volume Processed (Liters) 104.7 L   Dialyzer Clearance Moderately streaked   Duration of Treatment (minutes) 240 minutes   Hemodialysis Output (ml) 4500 ml   Tolerated Treatment Good     4 hour treatment completed. 4.5L of fluid removed. Pressure held to access for 10 mins x2. Dressing clean/dry and intact upon leaving the unit. Report given to primary RN. Charting printed and placed in bin to be scanned into EMR.

## 2024-02-26 NOTE — ED PROVIDER NOTES
[facility] EMERGENCY DEPARTMENT  EMERGENCY DEPARTMENT ENCOUNTER          Pt Name: Trav Jennings  MRN: [id number]  Birthdate 1965  Date of evaluation: 2/26/2024  Resident Physician: Wil Fatima MD EM Resident PGY-2  Attending Physician: Demarco Turpin DO      CHIEF COMPLAINT       Chief Complaint   Patient presents with    Shortness of Breath         HISTORY OF PRESENT ILLNESS    HPI  Trav Jennings is a 59 y.o. male who presents to the emergency department from home, by private vehicle for evaluation of shortness of breath.  Patient ESRD followed by Dr. Ayon Tuesday/Thursday/Saturday dialysis last session Saturday with 5.5 L pulled off.  Patient states he is feels like he is fluid overloaded.  Unsure of what his typical dry weight is.  Patient states that he feels his legs are more swollen and feels more short of breath with exertion.  Patient states that he is chronically on oxygen for over the past year currently on 4 L in ED states that his home oxygen requirements vary.  Patient denies fever, chills, chest pain, nausea, vomiting, diarrhea, constipation.  Patient states that he is an uric.    Patient easily frustrated [facility] [place] [facility].  Patient states that he has no planned procedures and his physicians know.  He is \"tired of all of these chronic things \"and would rather be in [place] than here.      The patient has no other acute complaints at this time.    ROS negative except as stated above.    PAST MEDICAL AND SURGICAL HISTORY     Past Medical History:   Diagnosis Date    AAA (abdominal aortic aneurysm) ([facility])     NURIS (acute kidney injury) ([facility]) 9/24/2015    Anemia associated with chronic renal failure     Arthritis     stated in hands    Cocaine abuse ([facility]) 5/10/2014    Depression     Diabetes mellitus ([facility])     pt states he no longer has diabetes he has lost alot of davion.     FSGS (focal segmental  gluconate, albuterol, contacted inpatient dialysis with consult to nephrology.  Patient admitted to hospitalist service.    Shared Decision-Making was performed, disposition discussed with the patient/family and questions answered.    Outpatient follow up (If applicable):  No follow-up provider specified.  The results of pertinent diagnostic studies and exam findings were discussed with the patient/surrogate.   The patient’s provisional diagnosis and plan of care were discussed with the patient and present family who expressed understanding.   Medications were reviewed and indications and risks of medications were discussed with the patient/family present.   Strict return precautions and follow up instructions were discussed with the patient prior to discharge, with which the patient agrees.     FINAL DIAGNOSES:  Final diagnoses:   Hyperkalemia   Hypervolemia, unspecified hypervolemia type   Acute on chronic congestive heart failure, unspecified heart failure type (HCC)   Elevated brain natriuretic peptide (BNP) level   Elevated troponin       Condition: condition: stable  Dispo: Admit to hospitalist  DISPOSITION Admitted 02/26/2024 01:57:51 PM      This transcription was electronically signed. It was dictated by use of voice recognition software and electronically transcribed. The transcription may contain errors not detected in proofreading.

## 2024-02-27 LAB
ALBUMIN SERPL BCG-MCNC: 3.4 G/DL (ref 3.5–5.1)
ALP SERPL-CCNC: 97 U/L (ref 38–126)
ALT SERPL W/O P-5'-P-CCNC: 10 U/L (ref 11–66)
ANION GAP SERPL CALC-SCNC: 9 MEQ/L (ref 8–16)
AST SERPL-CCNC: 14 U/L (ref 5–40)
BILIRUB SERPL-MCNC: 0.4 MG/DL (ref 0.3–1.2)
BUN SERPL-MCNC: 44 MG/DL (ref 7–22)
CALCIUM SERPL-MCNC: 7.7 MG/DL (ref 8.5–10.5)
CHLORIDE SERPL-SCNC: 103 MEQ/L (ref 98–111)
CO2 SERPL-SCNC: 27 MEQ/L (ref 23–33)
CREAT SERPL-MCNC: 6.3 MG/DL (ref 0.4–1.2)
GFR SERPL CREATININE-BSD FRML MDRD: 10 ML/MIN/1.73M2
GLUCOSE SERPL-MCNC: 115 MG/DL (ref 70–108)
OSMOLALITY SERPL CALC.SUM OF ELEC: 289.6 MOSMOL/KG (ref 275–300)
POTASSIUM SERPL-SCNC: 6.6 MEQ/L (ref 3.5–5.2)
PROCALCITONIN SERPL IA-MCNC: 0.9 NG/ML (ref 0.01–0.09)
PROT SERPL-MCNC: 6.4 G/DL (ref 6.1–8)
SODIUM SERPL-SCNC: 139 MEQ/L (ref 135–145)

## 2024-02-27 PROCEDURE — 5A1D70Z PERFORMANCE OF URINARY FILTRATION, INTERMITTENT, LESS THAN 6 HOURS PER DAY: ICD-10-PCS | Performed by: INTERNAL MEDICINE

## 2024-02-27 PROCEDURE — 2580000003 HC RX 258: Performed by: NURSE PRACTITIONER

## 2024-02-27 PROCEDURE — 6360000002 HC RX W HCPCS: Performed by: NURSE PRACTITIONER

## 2024-02-27 PROCEDURE — 36415 COLL VENOUS BLD VENIPUNCTURE: CPT

## 2024-02-27 PROCEDURE — 84145 PROCALCITONIN (PCT): CPT

## 2024-02-27 PROCEDURE — 3E033PZ INTRODUCTION OF PLATELET INHIBITOR INTO PERIPHERAL VEIN, PERCUTANEOUS APPROACH: ICD-10-PCS | Performed by: INTERNAL MEDICINE

## 2024-02-27 PROCEDURE — 94761 N-INVAS EAR/PLS OXIMETRY MLT: CPT

## 2024-02-27 PROCEDURE — 6370000000 HC RX 637 (ALT 250 FOR IP): Performed by: NURSE PRACTITIONER

## 2024-02-27 PROCEDURE — 2060000000 HC ICU INTERMEDIATE R&B

## 2024-02-27 PROCEDURE — 99232 SBSQ HOSP IP/OBS MODERATE 35: CPT | Performed by: INTERNAL MEDICINE

## 2024-02-27 PROCEDURE — 90935 HEMODIALYSIS ONE EVALUATION: CPT

## 2024-02-27 PROCEDURE — 2700000000 HC OXYGEN THERAPY PER DAY

## 2024-02-27 PROCEDURE — 99233 SBSQ HOSP IP/OBS HIGH 50: CPT | Performed by: NURSE PRACTITIONER

## 2024-02-27 PROCEDURE — 80053 COMPREHEN METABOLIC PANEL: CPT

## 2024-02-27 RX ORDER — AZITHROMYCIN 250 MG/1
250 TABLET, FILM COATED ORAL DAILY
Status: COMPLETED | OUTPATIENT
Start: 2024-02-28 | End: 2024-03-02

## 2024-02-27 RX ORDER — DOXAZOSIN 8 MG/1
8 TABLET ORAL 2 TIMES DAILY
Status: ON HOLD | COMMUNITY
End: 2024-03-08 | Stop reason: HOSPADM

## 2024-02-27 RX ORDER — CINACALCET 90 MG/1
180 TABLET, FILM COATED ORAL
Status: ON HOLD | COMMUNITY
End: 2024-03-08 | Stop reason: HOSPADM

## 2024-02-27 RX ORDER — AZITHROMYCIN 250 MG/1
500 TABLET, FILM COATED ORAL DAILY
Status: COMPLETED | OUTPATIENT
Start: 2024-02-27 | End: 2024-02-27

## 2024-02-27 RX ORDER — AMLODIPINE BESYLATE 10 MG/1
5 TABLET ORAL DAILY
COMMUNITY

## 2024-02-27 RX ORDER — CALCIUM GLUCONATE 94 MG/ML
1000 INJECTION, SOLUTION INTRAVENOUS ONCE
Status: DISCONTINUED | OUTPATIENT
Start: 2024-02-27 | End: 2024-02-27

## 2024-02-27 RX ORDER — FLUTICASONE PROPIONATE 50 MCG
1 SPRAY, SUSPENSION (ML) NASAL DAILY
COMMUNITY

## 2024-02-27 RX ORDER — ALBUTEROL SULFATE 90 UG/1
2 AEROSOL, METERED RESPIRATORY (INHALATION) EVERY 6 HOURS PRN
COMMUNITY

## 2024-02-27 RX ORDER — FERROUS GLUCONATE 324(38)MG
324 TABLET ORAL
COMMUNITY

## 2024-02-27 RX ORDER — NALOXONE HYDROCHLORIDE 4 MG/.1ML
1 SPRAY NASAL PRN
COMMUNITY

## 2024-02-27 RX ORDER — CETIRIZINE HYDROCHLORIDE 10 MG/1
10 TABLET ORAL DAILY
COMMUNITY

## 2024-02-27 RX ADMIN — VERAPAMIL HYDROCHLORIDE 240 MG: 240 TABLET, FILM COATED, EXTENDED RELEASE ORAL at 13:30

## 2024-02-27 RX ADMIN — HYDRALAZINE HYDROCHLORIDE 50 MG: 50 TABLET ORAL at 13:30

## 2024-02-27 RX ADMIN — SERTRALINE 100 MG: 100 TABLET, FILM COATED ORAL at 13:32

## 2024-02-27 RX ADMIN — ASPIRIN 81 MG: 81 TABLET, COATED ORAL at 13:31

## 2024-02-27 RX ADMIN — SODIUM ZIRCONIUM CYCLOSILICATE 10 G: 10 POWDER, FOR SUSPENSION ORAL at 13:32

## 2024-02-27 RX ADMIN — FAMOTIDINE 20 MG: 20 TABLET, FILM COATED ORAL at 13:31

## 2024-02-27 RX ADMIN — AZITHROMYCIN DIHYDRATE 500 MG: 250 TABLET ORAL at 13:31

## 2024-02-27 RX ADMIN — SODIUM CHLORIDE, PRESERVATIVE FREE 10 ML: 5 INJECTION INTRAVENOUS at 20:19

## 2024-02-27 RX ADMIN — SODIUM ZIRCONIUM CYCLOSILICATE 10 G: 10 POWDER, FOR SUSPENSION ORAL at 19:58

## 2024-02-27 RX ADMIN — HYDRALAZINE HYDROCHLORIDE 50 MG: 50 TABLET ORAL at 05:03

## 2024-02-27 RX ADMIN — ATORVASTATIN CALCIUM 40 MG: 40 TABLET, FILM COATED ORAL at 19:58

## 2024-02-27 RX ADMIN — HEPARIN SODIUM 5000 UNITS: 5000 INJECTION INTRAVENOUS; SUBCUTANEOUS at 20:24

## 2024-02-27 RX ADMIN — FERROUS SULFATE TAB 325 MG (65 MG ELEMENTAL FE) 325 MG: 325 (65 FE) TAB at 17:28

## 2024-02-27 RX ADMIN — SODIUM CHLORIDE, PRESERVATIVE FREE 10 ML: 5 INJECTION INTRAVENOUS at 13:32

## 2024-02-27 RX ADMIN — CLONIDINE HYDROCHLORIDE 0.3 MG: 0.2 TABLET ORAL at 13:37

## 2024-02-27 RX ADMIN — FERROUS SULFATE TAB 325 MG (65 MG ELEMENTAL FE) 325 MG: 325 (65 FE) TAB at 13:37

## 2024-02-27 RX ADMIN — HYDRALAZINE HYDROCHLORIDE 50 MG: 50 TABLET ORAL at 00:29

## 2024-02-27 RX ADMIN — CLONIDINE HYDROCHLORIDE 0.3 MG: 0.2 TABLET ORAL at 19:58

## 2024-02-27 RX ADMIN — CALCITRIOL CAPSULES 0.25 MCG 0.5 MCG: 0.25 CAPSULE ORAL at 13:31

## 2024-02-27 RX ADMIN — ISOSORBIDE MONONITRATE 120 MG: 60 TABLET, EXTENDED RELEASE ORAL at 13:31

## 2024-02-27 RX ADMIN — ISOSORBIDE MONONITRATE 120 MG: 60 TABLET, EXTENDED RELEASE ORAL at 19:57

## 2024-02-27 RX ADMIN — CINACALCET HYDROCHLORIDE 150 MG: 30 TABLET, FILM COATED ORAL at 13:30

## 2024-02-27 RX ADMIN — CLONIDINE HYDROCHLORIDE 0.3 MG: 0.2 TABLET ORAL at 00:30

## 2024-02-27 RX ADMIN — HYDRALAZINE HYDROCHLORIDE 50 MG: 50 TABLET ORAL at 20:19

## 2024-02-27 RX ADMIN — HEPARIN SODIUM 5000 UNITS: 5000 INJECTION INTRAVENOUS; SUBCUTANEOUS at 13:30

## 2024-02-27 RX ADMIN — FOLIC ACID 1 MG: 1 TABLET ORAL at 13:31

## 2024-02-27 RX ADMIN — CEFTRIAXONE SODIUM 1000 MG: 1 INJECTION, POWDER, FOR SOLUTION INTRAMUSCULAR; INTRAVENOUS at 13:54

## 2024-02-27 RX ADMIN — HEPARIN SODIUM 5000 UNITS: 5000 INJECTION INTRAVENOUS; SUBCUTANEOUS at 05:03

## 2024-02-27 RX ADMIN — SODIUM CHLORIDE, PRESERVATIVE FREE 10 ML: 5 INJECTION INTRAVENOUS at 00:31

## 2024-02-27 ASSESSMENT — PAIN SCALES - GENERAL
PAINLEVEL_OUTOF10: 0

## 2024-02-27 NOTE — FLOWSHEET NOTE
Stable 4 hour TX completed. Removed 1 L of fluid per pt request. pt tolerated fluid removal well. Pressure held to each needle site x 10 min. Drsg clean, dry, and intact upon leaving unit. Report called to primary RN. TX record printed for scanning into EMR.   02/27/24 0745 02/27/24 1210   Vital Signs   BP (!) 162/84 (!) 199/99   Temp 98.8 °F (37.1 °C) 98.2 °F (36.8 °C)   Pulse 87 82   Respirations 18 19   SpO2 98 % 99 %   Weight - Scale 95.5 kg (210 lb 8.6 oz) 94.5 kg (208 lb 5.4 oz)   Weight Method Bed scale Bed scale   Percent Weight Change  --  -1.05   Post-Hemodialysis Assessment   Post-Treatment Procedures  --  Blood returned;Access bleeding time < 10 minutes   Machine Disinfection Process  --  Acid/Vinegar Clean;Heat Disinfect;Exterior Machine Disinfection   Blood Volume Processed (Liters)  --  102.9 L   Dialyzer Clearance  --  Lightly streaked   Duration of Treatment (minutes)  --  240 minutes   Heparin Amount Administered During Treatment (mL)  --  0 mL   Hemodialysis Intake (ml)  --  500 ml   Hemodialysis Output (ml)  --  1500 ml   NET Removed (ml)  --  1000   Tolerated Treatment  --  Good

## 2024-02-27 NOTE — PROGRESS NOTES
sodium chloride       Scheduled Medications    sodium zirconium cyclosilicate  10 g Oral TID    albuterol  2.5 mg Nebulization Once    aspirin  81 mg Oral Daily    atorvastatin  40 mg Oral Nightly    calcitRIOL  0.5 mcg Oral Once per day on Tue Thu Sat    cinacalcet  150 mg Oral Once per day on Tue Thu Sat    cloNIDine  0.3 mg Oral TID    famotidine  20 mg Oral Daily    ferrous sulfate  325 mg Oral TID WC    folic acid  1 mg Oral Daily    hydrALAZINE  50 mg Oral 3 times per day    isosorbide mononitrate  120 mg Oral BID    sertraline  100 mg Oral Daily    verapamil  240 mg Oral Daily    sodium chloride flush  5-40 mL IntraVENous 2 times per day    heparin (porcine)  5,000 Units SubCUTAneous 3 times per day    nicotine  1 patch TransDERmal Q24H     PRN Meds: traZODone, sodium chloride flush, sodium chloride, ondansetron **OR** ondansetron, acetaminophen **OR** acetaminophen, hydrALAZINE      Intake/Output Summary (Last 24 hours) at 2/27/2024 1004  Last data filed at 2/27/2024 0533  Gross per 24 hour   Intake 200 ml   Output 4500 ml   Net -4300 ml       Diet:  ADULT DIET; Regular; Low Potassium (Less than 3000 mg/day)    Exam:  BP (!) 162/84   Pulse 87   Temp 98.8 °F (37.1 °C)   Resp 18   Ht 1.905 m (6' 3\")   Wt 95.5 kg (210 lb 8.6 oz)   SpO2 98%   BMI 26.32 kg/m²     General appearance: No apparent distress, appears stated age and cooperative.  HEENT: Pupils equal, round, and reactive to light. Conjunctivae/corneas clear.  Neck: Supple, with full range of motion. No jugular venous distention. Trachea midline.  Respiratory:  Normal respiratory effort. Clear to auscultation, bilaterally without Rales/Wheezes/Rhonchi.  Cardiovascular: Regular rate and rhythm with normal S1/S2 without murmurs, rubs or gallops.  Abdomen: Soft, non-tender, non-distended with normal bowel sounds.  Musculoskeletal: passive and active ROM x 4 extremities.  Skin: Skin color, texture, turgor normal.    Neurologic:  Neurovascularly intact  SCDs                                 [x] SQ Heparin                                 [] Encourage ambulation           [] Already on Anticoagulation     Code Status: Full Code    PT/OT Eval Status: Monitor    Tele:   [x] Yes SR HR 88             [] no    Active Hospital Problems    Diagnosis Date Noted    Hyperkalemia [E87.5]     Essential hypertension [I10]        Electronically signed by WADE Mcfarlane CNP on 2/27/2024 at 10:04 AM

## 2024-02-27 NOTE — PLAN OF CARE
Gissel MCCARTHY RN  Outcome: Progressing  Flowsheets (Taken 2/27/2024 0118)  Maintains optimal cardiac output and hemodynamic stability:   Monitor blood pressure and heart rate   Assess for signs of decreased cardiac output     Problem: Cardiovascular - Adult  Goal: Absence of cardiac dysrhythmias or at baseline  2/27/2024 0118 by Gissel Herndon, RN  Outcome: Progressing  Flowsheets (Taken 2/27/2024 0118)  Absence of cardiac dysrhythmias or at baseline:   Monitor cardiac rate and rhythm   Assess for signs of decreased cardiac output     Problem: Skin/Tissue Integrity - Adult  Goal: Skin integrity remains intact  2/27/2024 0118 by Gissel Herndon, RN  Outcome: Progressing  Flowsheets (Taken 2/27/2024 0118)  Skin Integrity Remains Intact:   Assess vascular access sites hourly   Monitor for areas of redness and/or skin breakdown   Every 4-6 hours minimum: Change oxygen saturation probe site     Problem: Skin/Tissue Integrity - Adult  Goal: Incisions, wounds, or drain sites healing without S/S of infection  2/27/2024 0118 by Gissel Herndon RN  Outcome: Progressing  Flowsheets (Taken 2/27/2024 0118)  Incisions, Wounds, or Drain Sites Healing Without Sign and Symptoms of Infection: Initiate pressure ulcer prevention bundle as indicated     Problem: Infection - Adult  Goal: Absence of infection at discharge  2/27/2024 0118 by Gissel Herndon RN  Outcome: Progressing  Flowsheets (Taken 2/27/2024 0118)  Absence of infection at discharge:   Monitor lab/diagnostic results   Monitor all insertion sites i.e., indwelling lines, tubes and drains   Assess and monitor for signs and symptoms of infection     Problem: Metabolic/Fluid and Electrolytes - Adult  Goal: Electrolytes maintained within normal limits  2/27/2024 0118 by Gissel Herndon, RN  Outcome: Progressing  Flowsheets (Taken 2/27/2024 0118)  Electrolytes maintained within normal limits:   Monitor labs and assess patient for signs and symptoms of electrolyte imbalances    Monitor response to electrolyte replacements, including repeat lab results as appropriate   Fluid restriction as ordered   Instruct patient on fluid and nutrition restrictions as appropriate   Care plan reviewed with patient att bedside.  Pt contributed to goal setting and verbalized understanding.

## 2024-02-27 NOTE — CARE COORDINATION
Case Management Assessment  Initial Evaluation    Date/Time of Evaluation: 2/27/2024 11:19 AM  Assessment Completed by: Yovany Barfield RN    If patient is discharged prior to next notation, then this note serves as note for discharge by case management.    Patient Name: Trav Jennings                   YOB: 1965  Diagnosis: Hyperkalemia [E87.5]  Elevated troponin [R79.89]  Elevated brain natriuretic peptide (BNP) level [R79.89]  Hypervolemia, unspecified hypervolemia type [E87.70]  Acute on chronic congestive heart failure, unspecified heart failure type (HCC) [I50.9]                   Date / Time: 2/26/2024 12:25 PM  Location: [address] [place] [address]     Patient Admission Status: Inpatient   Readmission Risk Low 0-14, Mod 15-19), High > 20: Readmission Risk Score: 30.7    Current PCP: Radha Hou APRN - CNP  PCP verified by CM? Yes    Chart Reviewed: Yes      History Provided by: Medical Record  Patient Orientation: Alert and Oriented    Patient Cognition: Alert    Hospitalization in the last 30 days (Readmission):  Yes    If yes, Readmission Assessment in CM Navigator will be completed.    Advance Directives:      Code Status: Full Code   Patient's Primary Decision Maker is: Legal Next of Kin      Discharge Planning:    Patient lives with: Spouse/Significant Other Type of Home: House  Primary Care Giver: Self  Patient Support Systems include: Family Members, Spouse/Significant Other   Current Financial resources: Medicare, Medicaid  Current community resources: None  Current services prior to admission: Durable Medical Equipment, Oxygen Therapy            Current DME: Oxygen Therapy (Comment), [facility]            Type of Home Care services:  None    ADLS  Prior functional level: Independent in ADLs/IADLs  Current functional level: Independent in ADLs/IADLs    Family can provide assistance at DC: Yes  Would you like Case Management to discuss the discharge plan with any other family members/significant  No

## 2024-02-27 NOTE — PLAN OF CARE
without S/S of infection  Outcome: Progressing  Flowsheets (Taken 2/26/2024 2029)  Incisions, Wounds, or Drain Sites Healing Without Sign and Symptoms of Infection:   ADMISSION and DAILY: Assess and document risk factors for pressure ulcer development   TWICE DAILY: Assess and document dressing/incision, wound bed, drain sites and surrounding tissue   TWICE DAILY: Assess and document skin integrity  Goal: Oral mucous membranes remain intact  Outcome: Progressing  Flowsheets (Taken 2/26/2024 2029)  Oral Mucous Membranes Remain Intact:   Assess oral mucosa and hygiene practices   Implement preventative oral hygiene regimen     Problem: Musculoskeletal - Adult  Goal: Return mobility to safest level of function  Outcome: Progressing  Flowsheets (Taken 2/26/2024 2029)  Return Mobility to Safest Level of Function:   Assess patient stability and activity tolerance for standing, transferring and ambulating with or without assistive devices   Ensure adequate protection for wounds/incisions during mobilization   Assist with transfers and ambulation using safe patient handling equipment as needed  Goal: Maintain proper alignment of affected body part  Outcome: Progressing  Flowsheets (Taken 2/26/2024 2029)  Maintain proper alignment of affected body part: Support and protect limb and body alignment per provider's orders  Goal: Return ADL status to a safe level of function  Outcome: Progressing  Flowsheets (Taken 2/26/2024 2029)  Return ADL Status to a Safe Level of Function:   Administer medication as ordered   Assess activities of daily living deficits and provide assistive devices as needed     Problem: Infection - Adult  Goal: Absence of infection at discharge  Outcome: Progressing  Flowsheets (Taken 2/26/2024 2029)  Absence of infection at discharge:   Assess and monitor for signs and symptoms of infection   Monitor lab/diagnostic results   Monitor all insertion sites i.e., indwelling lines, tubes and drains  Goal: Absence

## 2024-02-27 NOTE — CARE COORDINATION
02/27/24 1120   Readmission Assessment   Number of Days since last admission? 8-30 days   Previous Disposition Home with Family   Who is being Interviewed Caregiver   What was the patient's/caregiver's perception as to why they think they needed to return back to the hospital? Other (Comment)  (Hyperkalemia)   Did you visit your Primary Care Physician after you left the hospital, before you returned this time? No   Why weren't you able to visit your PCP? Other (Comment)  (unknown)   Did you see a specialist, such as Cardiac, Pulmonary, Orthopedic Physician, etc. after you left the hospital? Yes   Who advised the patient to return to the hospital? Self-referral   Does the patient report anything that got in the way of taking their medications? No   In our efforts to provide the best possible care to you and others like you, can you think of anything that we could have done to help you after you left the hospital the first time, so that you might not have needed to return so soon? Other (Comment)  (no)

## 2024-02-27 NOTE — PLAN OF CARE
Problem: Discharge Planning  Goal: Discharge to home or other facility with appropriate resources  Outcome: Progressing  Flowsheets (Taken 2/27/2024 1746)  Discharge to home or other facility with appropriate resources:   Identify barriers to discharge with patient and caregiver   Identify discharge learning needs (meds, wound care, etc)   Refer to discharge planning if patient needs post-hospital services based on physician order or complex needs related to functional status, cognitive ability or social support system   Arrange for needed discharge resources and transportation as appropriate   Arrange for interpreters to assist at discharge as needed     Problem: Safety - Adult  Goal: Free from fall injury  Outcome: Progressing  Flowsheets (Taken 2/27/2024 1746)  Free From Fall Injury: Instruct family/caregiver on patient safety     Problem: ABCDS Injury Assessment  Goal: Absence of physical injury  Outcome: Progressing  Flowsheets (Taken 2/27/2024 1746)  Absence of Physical Injury: Implement safety measures based on patient assessment     Problem: Respiratory - Adult  Goal: Achieves optimal ventilation and oxygenation  Outcome: Progressing  Flowsheets (Taken 2/27/2024 1746)  Achieves optimal ventilation and oxygenation:   Position to facilitate oxygenation and minimize respiratory effort   Initiate smoking cessation protocol as indicated   Assess the need for suctioning and aspirate as needed   Respiratory therapy support as indicated   Assess and instruct to report shortness of breath or any respiratory difficulty   Encourage broncho-pulmonary hygiene including cough, deep breathe, incentive spirometry   Oxygen supplementation based on oxygen saturation or arterial blood gases   Assess for changes in mentation and behavior   Assess for changes in respiratory status     Problem: Cardiovascular - Adult  Goal: Maintains optimal cardiac output and hemodynamic stability  Outcome: Progressing  Flowsheets (Taken  Implement oral medicated treatments as ordered     Problem: Musculoskeletal - Adult  Goal: Return mobility to safest level of function  Outcome: Progressing  Flowsheets (Taken 2/27/2024 1746)  Return Mobility to Safest Level of Function:   Assist with transfers and ambulation using safe patient handling equipment as needed   Assess patient stability and activity tolerance for standing, transferring and ambulating with or without assistive devices   Obtain physical therapy/occupational therapy consults as needed   Ensure adequate protection for wounds/incisions during mobilization   Apply continuous passive motion per provider or physical therapy orders to increase flexion toward goal   Instruct patient/family in ordered activity level  Goal: Maintain proper alignment of affected body part  Outcome: Progressing  Flowsheets (Taken 2/27/2024 1746)  Maintain proper alignment of affected body part: Support and protect limb and body alignment per provider's orders  Goal: Return ADL status to a safe level of function  Outcome: Progressing  Flowsheets (Taken 2/27/2024 1746)  Return ADL Status to a Safe Level of Function:   Administer medication as ordered   Obtain physical therapy/occupational therapy consults as needed   Assist and instruct patient to increase activity and self care as tolerated   Assess activities of daily living deficits and provide assistive devices as needed     Problem: Infection - Adult  Goal: Absence of infection at discharge  Outcome: Progressing  Flowsheets (Taken 2/27/2024 1746)  Absence of infection at discharge:   Assess and monitor for signs and symptoms of infection   Monitor lab/diagnostic results   Monitor all insertion sites i.e., indwelling lines, tubes and drains   [place] appropriate cooling/warming therapies per order   Administer medications as ordered   Instruct and encourage patient and family to use good hand hygiene technique   Identify and instruct in appropriate isolation

## 2024-02-27 NOTE — PROGRESS NOTES
Pt admitted to  4K10 from ED.     Complaints: Hyperkalemia, HTN.      IV site free of s/s of infection or infiltration.     Vital signs obtained. Assessment and data collection initiated. Two nurse skin assessment performed by Misti HAQUE and Gissel HAQUE.    Swallow Screen completed and documented for all patients ages 45 and older.    Policies and procedures for 4K explained. All questions answered with no further questions at this time. Fall prevention and safety brochure discussed with patient.  Bed alarm on. Call light in reach. Oriented to room.

## 2024-02-27 NOTE — PROGRESS NOTES
Pharmacy Medication History Note      List of current medications patient is taking is complete.    Source of information: Dialysis Center Medication Report, Patient at bedside, Dispense Report    Changes made to medication list:  Medications removed (include reason, patient does not take):      Medications added:  Amlodipine 10 mg tablet  Doxazosin 8 mg tablet  Cetirizine 10 mg tablet  Fluticasone 50 mcg/ act nasal spray  Albuterol 108 mcg/act inhaler  naloxone 4 MG/0.1ML liquid nasal spray     Medications dose/frequency adjustments:  Atorvastatin 80 mg tablet - 0.5 tablet once nightly  Famotidine 20 mg tablet - 1 tablet twice daily  Sucroferric Oxyhydroxide 500 mg tablet - Take 1 tablet with meals and snacks three times daily  Adjusted ferrous sulfate to ferrous gluconate   Adjusted vitamin D to daily   Adjusted cinacalcet 180 mg with dialysis     Other notes (ex. Recent course of antibiotics, Coumadin dosing):  Patient needs new medication orders for albuterol sulfate inhaler, fluticasone spray, and sertraline 100 mg tablet because he ran out and says it is a priority  Patient fills Imdur daily, but patient confirms that he takes BID   Denies use of other OTC or herbal medications.    Allergies reviewed    Electronically signed by Israel Linares on 2/27/2024 at 2:31 PM

## 2024-02-28 LAB
ANION GAP SERPL CALC-SCNC: 10 MEQ/L (ref 8–16)
BUN SERPL-MCNC: 35 MG/DL (ref 7–22)
CALCIUM SERPL-MCNC: 7.2 MG/DL (ref 8.5–10.5)
CHLORIDE SERPL-SCNC: 103 MEQ/L (ref 98–111)
CO2 SERPL-SCNC: 26 MEQ/L (ref 23–33)
CREAT SERPL-MCNC: 5.5 MG/DL (ref 0.4–1.2)
GFR SERPL CREATININE-BSD FRML MDRD: 11 ML/MIN/1.73M2
GLUCOSE SERPL-MCNC: 133 MG/DL (ref 70–108)
POTASSIUM SERPL-SCNC: 5.7 MEQ/L (ref 3.5–5.2)
SODIUM SERPL-SCNC: 139 MEQ/L (ref 135–145)

## 2024-02-28 PROCEDURE — 99232 SBSQ HOSP IP/OBS MODERATE 35: CPT | Performed by: NURSE PRACTITIONER

## 2024-02-28 PROCEDURE — 36415 COLL VENOUS BLD VENIPUNCTURE: CPT

## 2024-02-28 PROCEDURE — 2580000003 HC RX 258: Performed by: NURSE PRACTITIONER

## 2024-02-28 PROCEDURE — 80048 BASIC METABOLIC PNL TOTAL CA: CPT

## 2024-02-28 PROCEDURE — 6360000002 HC RX W HCPCS: Performed by: NURSE PRACTITIONER

## 2024-02-28 PROCEDURE — 99232 SBSQ HOSP IP/OBS MODERATE 35: CPT | Performed by: INTERNAL MEDICINE

## 2024-02-28 PROCEDURE — 6370000000 HC RX 637 (ALT 250 FOR IP): Performed by: NURSE PRACTITIONER

## 2024-02-28 PROCEDURE — 90935 HEMODIALYSIS ONE EVALUATION: CPT | Performed by: INTERNAL MEDICINE

## 2024-02-28 PROCEDURE — 1200000000 HC SEMI PRIVATE

## 2024-02-28 RX ADMIN — HEPARIN SODIUM 5000 UNITS: 5000 INJECTION INTRAVENOUS; SUBCUTANEOUS at 06:34

## 2024-02-28 RX ADMIN — FAMOTIDINE 20 MG: 20 TABLET, FILM COATED ORAL at 08:30

## 2024-02-28 RX ADMIN — CLONIDINE HYDROCHLORIDE 0.3 MG: 0.2 TABLET ORAL at 20:48

## 2024-02-28 RX ADMIN — HYDRALAZINE HYDROCHLORIDE 50 MG: 50 TABLET ORAL at 17:00

## 2024-02-28 RX ADMIN — HYDRALAZINE HYDROCHLORIDE 50 MG: 50 TABLET ORAL at 20:48

## 2024-02-28 RX ADMIN — CEFTRIAXONE SODIUM 1000 MG: 1 INJECTION, POWDER, FOR SOLUTION INTRAMUSCULAR; INTRAVENOUS at 17:12

## 2024-02-28 RX ADMIN — SODIUM ZIRCONIUM CYCLOSILICATE 10 G: 10 POWDER, FOR SUSPENSION ORAL at 08:29

## 2024-02-28 RX ADMIN — HEPARIN SODIUM 5000 UNITS: 5000 INJECTION INTRAVENOUS; SUBCUTANEOUS at 17:00

## 2024-02-28 RX ADMIN — ATORVASTATIN CALCIUM 40 MG: 40 TABLET, FILM COATED ORAL at 20:48

## 2024-02-28 RX ADMIN — ACETAMINOPHEN 650 MG: 325 TABLET ORAL at 08:29

## 2024-02-28 RX ADMIN — ASPIRIN 81 MG: 81 TABLET, COATED ORAL at 08:30

## 2024-02-28 RX ADMIN — ACETAMINOPHEN 650 MG: 325 TABLET ORAL at 17:00

## 2024-02-28 RX ADMIN — SERTRALINE 100 MG: 100 TABLET, FILM COATED ORAL at 08:30

## 2024-02-28 RX ADMIN — SODIUM CHLORIDE, PRESERVATIVE FREE 10 ML: 5 INJECTION INTRAVENOUS at 08:29

## 2024-02-28 RX ADMIN — CLONIDINE HYDROCHLORIDE 0.3 MG: 0.2 TABLET ORAL at 08:30

## 2024-02-28 RX ADMIN — VERAPAMIL HYDROCHLORIDE 240 MG: 240 TABLET, FILM COATED, EXTENDED RELEASE ORAL at 08:30

## 2024-02-28 RX ADMIN — ISOSORBIDE MONONITRATE 120 MG: 60 TABLET, EXTENDED RELEASE ORAL at 20:47

## 2024-02-28 RX ADMIN — ISOSORBIDE MONONITRATE 120 MG: 60 TABLET, EXTENDED RELEASE ORAL at 08:29

## 2024-02-28 RX ADMIN — FERROUS SULFATE TAB 325 MG (65 MG ELEMENTAL FE) 325 MG: 325 (65 FE) TAB at 08:30

## 2024-02-28 RX ADMIN — HYDRALAZINE HYDROCHLORIDE 50 MG: 50 TABLET ORAL at 06:34

## 2024-02-28 RX ADMIN — FOLIC ACID 1 MG: 1 TABLET ORAL at 08:30

## 2024-02-28 RX ADMIN — AZITHROMYCIN DIHYDRATE 250 MG: 250 TABLET ORAL at 08:30

## 2024-02-28 RX ADMIN — FERROUS SULFATE TAB 325 MG (65 MG ELEMENTAL FE) 325 MG: 325 (65 FE) TAB at 17:00

## 2024-02-28 RX ADMIN — HEPARIN SODIUM 5000 UNITS: 5000 INJECTION INTRAVENOUS; SUBCUTANEOUS at 20:48

## 2024-02-28 RX ADMIN — HYDRALAZINE HYDROCHLORIDE 10 MG: 20 INJECTION, SOLUTION INTRAMUSCULAR; INTRAVENOUS at 17:00

## 2024-02-28 RX ADMIN — CLONIDINE HYDROCHLORIDE 0.3 MG: 0.2 TABLET ORAL at 16:59

## 2024-02-28 ASSESSMENT — PAIN SCALES - GENERAL
PAINLEVEL_OUTOF10: 6
PAINLEVEL_OUTOF10: 0

## 2024-02-28 ASSESSMENT — PAIN DESCRIPTION - ORIENTATION
ORIENTATION: MID
ORIENTATION: MID

## 2024-02-28 ASSESSMENT — PAIN DESCRIPTION - LOCATION
LOCATION: ABDOMEN
LOCATION: HEAD;BACK
LOCATION: ABDOMEN

## 2024-02-28 ASSESSMENT — PAIN DESCRIPTION - PAIN TYPE: TYPE: CHRONIC PAIN

## 2024-02-28 ASSESSMENT — PAIN DESCRIPTION - DESCRIPTORS
DESCRIPTORS: ACHING
DESCRIPTORS: ACHING
DESCRIPTORS: ACHING;DISCOMFORT

## 2024-02-28 ASSESSMENT — PAIN DESCRIPTION - ONSET: ONSET: ON-GOING

## 2024-02-28 ASSESSMENT — PAIN DESCRIPTION - FREQUENCY: FREQUENCY: CONTINUOUS

## 2024-02-28 ASSESSMENT — PAIN SCALES - WONG BAKER: WONGBAKER_NUMERICALRESPONSE: 0

## 2024-02-28 NOTE — FLOWSHEET NOTE
02/28/24 1115 02/28/24 1545   Vital Signs   /79 (!) 194/102   Temp 98.1 °F (36.7 °C) 97.7 °F (36.5 °C)   Pulse 85 82   Respirations 17 19   SpO2 93 % 96 %   Weight - Scale 93.5 kg (206 lb 2.1 oz) 93 kg (205 lb 0.4 oz)   Weight Method Standing scale Estimated   Percent Weight Change -1.06 -0.53   Post-Hemodialysis Assessment   Post-Treatment Procedures  --  Blood returned;Access bleeding time < 10 minutes   Machine Disinfection Process  --  Acid/Vinegar Clean;Heat Disinfect;Exterior Machine Disinfection   Blood Volume Processed (Liters)  --  114.1 L   Dialyzer Clearance  --  Lightly streaked   Duration of Treatment (minutes)  --  240 minutes   Heparin Amount Administered During Treatment (mL)  --  0 mL   Hemodialysis Intake (ml)  --  400 ml   Hemodialysis Output (ml)  --  900 ml   NET Removed (ml)  --  500     completed 4 hr HD TX and removed 500 mls, pt tolerated HD TX well. Pressure held to needle site for 10 mins x2. Dressing clean/dry and intact upon leaving the unit. Report given to primary nurse, record completed and printed to be scanned into pt's EMR.

## 2024-02-28 NOTE — CARE COORDINATION
Collaborative Discharge Planning    Trav Jennings  :  1965  MRN:  [id number]    ADMIT DATE:  2024      Discharge Planning Discharge Planning  Type of Residence: House  Living Arrangements: Spouse/Significant Other  Support Systems: Family Members, Spouse/Significant Other  Current Services Prior To Admission: Durable Medical Equipment, Oxygen Therapy  Current DME Prior to Arrival: Oxygen Therapy (Comment), Walker  Potential Assistance Needed: N/A  DME Ordered?: No  Potential Assistance Purchasing Medications: No  Type of Home Care Services: None  Patient expects to be discharged to:: House  Follow Up Appointment: Best Day/Time :  (non-HD days)  One/Two Story Residence: Other (comment) (unknown)  Lift Chair Available: No  History of falls?: No  White Board Notes /Social Work Whiteboard Notes  /Social Work Whiteboard:  CM; plans home w spouse Crystal, has [facility] home oxygen 2-4L, still drives, [facility] T-R-S 6 am chair time, need NEW HOME OXYGEN ORDERS, B/W Cab transports    Discharge Plan Home with family  plans home w spouse Crystal, has [facility] L T-R-S 6 am chair time, B/W Cab transports, has AVF, walker, [facility] home oxygen 2-4L w need for new orders, current w [facility]   Discharge Milestones and Delays: Clinical status  Hyperkalemia/B/L Pleural Effusion/Fluid Overload  History: Cocaine/Smoker/Alcohol Use, ESRD    3/ Fistulogram planned    Oxygen 1L continued    IV Rocephin    Catapres for HTN today    Creatinine 5.5, K+ 6.3; monitor     Client assigned to 6K; Hand-Off given to CHRISTIANO Salgado CM      SIGNED:  Yovany Barfield RN   2024, 3:10 PM

## 2024-02-28 NOTE — PLAN OF CARE
Problem: Discharge Planning  Goal: Discharge to home or other facility with appropriate resources  2/27/2024 2217 by Linette Cannon RN  Outcome: Progressing  Flowsheets (Taken 2/27/2024 2000)  Discharge to home or other facility with appropriate resources:   Identify barriers to discharge with patient and caregiver   Arrange for needed discharge resources and transportation as appropriate   Identify discharge learning needs (meds, wound care, etc)   Arrange for interpreters to assist at discharge as needed   Refer to discharge planning if patient needs post-hospital services based on physician order or complex needs related to functional status, cognitive ability or social support system  2/27/2024 1746 by Ada Pierce RN  Outcome: Progressing  Flowsheets (Taken 2/27/2024 1746)  Discharge to home or other facility with appropriate resources:   Identify barriers to discharge with patient and caregiver   Identify discharge learning needs (meds, wound care, etc)   Refer to discharge planning if patient needs post-hospital services based on physician order or complex needs related to functional status, cognitive ability or social support system   Arrange for needed discharge resources and transportation as appropriate   Arrange for interpreters to assist at discharge as needed     Problem: Safety - Adult  Goal: Free from fall injury  2/27/2024 2217 by Linette Cannon RN  Outcome: Progressing  Flowsheets (Taken 2/27/2024 1746 by Ada Pierce, RN)  Free From Fall Injury: Instruct family/caregiver on patient safety  2/27/2024 1746 by Ada Pierce, RN  Outcome: Progressing  Flowsheets (Taken 2/27/2024 1746)  Free From Fall Injury: Instruct family/caregiver on patient safety     Problem: ABCDS Injury Assessment  Goal: Absence of physical injury  2/27/2024 2217 by Linette Cannon RN  Outcome: Progressing  Flowsheets (Taken 2/27/2024 1746 by Ada Pierce, RN)  Absence of Physical Injury: Implement safety measures  decreased cardiac output   Administer fluid and/or volume expanders as ordered  Goal: Absence of cardiac dysrhythmias or at baseline  2/27/2024 2217 by Linette Cannon RN  Outcome: Progressing  Flowsheets (Taken 2/27/2024 2000)  Absence of cardiac dysrhythmias or at baseline: Monitor cardiac rate and rhythm  2/27/2024 1746 by Ada Pierce RN  Outcome: Progressing  Flowsheets (Taken 2/27/2024 1746)  Absence of cardiac dysrhythmias or at baseline:   Monitor cardiac rate and rhythm   Administer antiarrhythmia medication and electrolyte replacement as ordered   Assess for signs of decreased cardiac output     Problem: Skin/Tissue Integrity - Adult  Goal: Skin integrity remains intact  2/27/2024 2217 by Linette Cannon RN  Outcome: Progressing  Flowsheets (Taken 2/27/2024 2000)  Skin Integrity Remains Intact:   Monitor for areas of redness and/or skin breakdown   Assess vascular access sites hourly   Every 4-6 hours minimum: Change oxygen saturation probe site   Every 4-6 hours: If on nasal continuous positive airway pressure, respiratory therapy assesses nares and determine need for appliance change or resting period  2/27/2024 1746 by Ada Pierce RN  Outcome: Progressing  Flowsheets (Taken 2/27/2024 1746)  Skin Integrity Remains Intact:   Monitor for areas of redness and/or skin breakdown   Assess vascular access sites hourly   Every 4-6 hours minimum: Change oxygen saturation probe site   Every 4-6 hours: If on nasal continuous positive airway pressure, respiratory therapy assesses nares and determine need for appliance change or resting period  Goal: Incisions, wounds, or drain sites healing without S/S of infection  2/27/2024 2217 by Linette Cannon RN  Outcome: Progressing  Flowsheets (Taken 2/27/2024 2000)  Incisions, Wounds, or Drain Sites Healing Without Sign and Symptoms of Infection:   ADMISSION and DAILY: Assess and document risk factors for pressure ulcer development   TWICE DAILY: Assess and

## 2024-02-28 NOTE — PROGRESS NOTES
Hospitalist Progress Note    Patient:  Trav Jennings      Unit/Bed:4K-10/010-A    YOB: 1965    MRN: [id number]       Acct: [id number]     PCP: Radha Hou APRN - CNP    Date of Admission: 2/26/2024    Assessment/Plan:    Volume Overload--BNP noted to be 37,826; appreciate nephrology input, did have HD as scheduled on 2/24, had HD on 2/26, 2/27, patient told nephrology on 2/26 that he was noncompliant with his fluid intake over the weekend has been drinking a lot of protein shakes; improved  Hypertensive urgency--likely secondary to #1, resolved  Hyperkalemia--L[place]elma, improved, nephrology is on the case  Possible pneumonia, bilateral lower lobes (POA) possibly secondary to gram-negative bacilli--procalcitonin noted be 0.9, patient is afebrile, on his baseline oxygen needs; Rocephin 2/27, Zithromax 2/27; discussed with patient, encourage incentive spirometry and Acapella  ESRD on HD--on Tuesday/Thursday/Saturday; nephrology has been consulted and had HD 2/26, 2/27  Primary hypertension, uncontrolled--on Catapres, hydralazine, Imdur, verapamil; hydralazine 10 mg IV push every 6 hours as needed systolic blood pressure greater than 170, monitor  Small bilateral pleural effusions--back on his baseline oxygen needs; lungs are improved aeration  Obstructive hypertrophic cardiomyopathy secondary to uncontrolled hypertension--echo from May 24, 2023 revealed an EF 65 to 70% with marked LVOT obstruction  Diabetes mellitus type 2, controlled--hemoglobin 5.5 on December 25, 2023  Hyperlipidemia--on statin  Chronic AAA--follows with vascular surgery in [place]  History of cocaine use--denies recent use  Secondary hyperparathyroidism--on calcitriol  Probable REZA--definitely needs outpatient sleep follow-up  Acute on chronic chronic hypoxic respiratory failure--on 3 L of oxygen around-the-clock at home; has his portable tank at the bedside; greatly improved will likely need repeat home oxygen

## 2024-02-28 NOTE — PROGRESS NOTES
Order for fistulagram reviewed with Dr. Perla. Pt scheduled for procedure on Friday, March 1, 2024 at 0930. Please keep pt NPO for 4 hours prior to procedure. Pt's GARLAND Payne updated.

## 2024-02-28 NOTE — PROGRESS NOTES
[facility]   Nephrology progress note  2/28/2024, 10:16 AM      Pt Name:    Trav Jennings  MRN:     [id number]     YOB: 1965  Admit Date:    2/26/2024 12:25 PM    Chief Complaint: Nephrology following for ESRD and hyperkalemia and fluid overload.    Subjective:  Patient seen and examined  No chest pain.  Shortness of breath is much better  Currently only on nasal cannula    Objective:  24HR INTAKE/OUTPUT:    Intake/Output Summary (Last 24 hours) at 2/28/2024 1016  Last data filed at 2/28/2024 0824  Gross per 24 hour   Intake 1570 ml   Output 1500 ml   Net 70 ml        Admission weight: 94.6 kg (208 lb 8.9 oz)  Wt Readings from Last 3 Encounters:   02/27/24 94.5 kg (208 lb 5.4 oz)   02/16/24 92.6 kg (204 lb 2.3 oz)   02/06/24 94.4 kg (208 lb 1.8 oz)        Vitals :   Vitals:    02/27/24 1954 02/27/24 2331 02/28/24 0300 02/28/24 0815   BP: (!) 157/76 (!) 140/70 (!) 149/76 (!) 168/83   Pulse: 85 78 84 84   Resp: 16 18 18 18   Temp: 98.3 °F (36.8 °C) 98.2 °F (36.8 °C) 97.7 °F (36.5 °C) 98.1 °F (36.7 °C)   TempSrc: Oral Oral Oral Oral   SpO2: 100% 99% 100% 96%   Weight:       Height:           Physical examination  General Appearance:  Well developed. No distress  Mouth/Throat:  Oral mucosa moist  Neck:  Supple, no JVD  Lungs:  Breath sounds: clear  Heart::  S1,S2 heard  Abdomen:  Soft, non - tender  Musculoskeletal:  Edema -resolved    Medications:  Infusion:    sodium chloride       Meds:    sodium zirconium cyclosilicate  10 g Oral TID    cefTRIAXone (ROCEPHIN) IV  1,000 mg IntraVENous Q24H    azithromycin  250 mg Oral Daily    albuterol  2.5 mg Nebulization Once    aspirin  81 mg Oral Daily    atorvastatin  40 mg Oral Nightly    calcitRIOL  0.5 mcg Oral Once per day on Tue Thu Sat    cinacalcet  150 mg Oral Once per day on Tue Thu Sat    cloNIDine  0.3 mg Oral TID    famotidine  20 mg Oral Daily    ferrous sulfate  325 mg Oral TID WC    folic acid  1 mg Oral Daily    hydrALAZINE  50

## 2024-02-29 LAB
ANION GAP SERPL CALC-SCNC: 11 MEQ/L (ref 8–16)
BUN SERPL-MCNC: 29 MG/DL (ref 7–22)
CALCIUM SERPL-MCNC: 7.6 MG/DL (ref 8.5–10.5)
CHLORIDE SERPL-SCNC: 103 MEQ/L (ref 98–111)
CO2 SERPL-SCNC: 25 MEQ/L (ref 23–33)
CREAT SERPL-MCNC: 4.7 MG/DL (ref 0.4–1.2)
GFR SERPL CREATININE-BSD FRML MDRD: 14 ML/MIN/1.73M2
GLUCOSE SERPL-MCNC: 102 MG/DL (ref 70–108)
POTASSIUM SERPL-SCNC: 5.9 MEQ/L (ref 3.5–5.2)
SODIUM SERPL-SCNC: 139 MEQ/L (ref 135–145)

## 2024-02-29 PROCEDURE — 6370000000 HC RX 637 (ALT 250 FOR IP): Performed by: INTERNAL MEDICINE

## 2024-02-29 PROCEDURE — 99232 SBSQ HOSP IP/OBS MODERATE 35: CPT | Performed by: INTERNAL MEDICINE

## 2024-02-29 PROCEDURE — 80048 BASIC METABOLIC PNL TOTAL CA: CPT

## 2024-02-29 PROCEDURE — 6370000000 HC RX 637 (ALT 250 FOR IP): Performed by: NURSE PRACTITIONER

## 2024-02-29 PROCEDURE — 2700000000 HC OXYGEN THERAPY PER DAY

## 2024-02-29 PROCEDURE — 36415 COLL VENOUS BLD VENIPUNCTURE: CPT

## 2024-02-29 PROCEDURE — 94761 N-INVAS EAR/PLS OXIMETRY MLT: CPT

## 2024-02-29 PROCEDURE — 1200000000 HC SEMI PRIVATE

## 2024-02-29 PROCEDURE — 94640 AIRWAY INHALATION TREATMENT: CPT

## 2024-02-29 PROCEDURE — 90935 HEMODIALYSIS ONE EVALUATION: CPT

## 2024-02-29 PROCEDURE — 2580000003 HC RX 258: Performed by: NURSE PRACTITIONER

## 2024-02-29 PROCEDURE — 6360000002 HC RX W HCPCS: Performed by: NURSE PRACTITIONER

## 2024-02-29 PROCEDURE — 90935 HEMODIALYSIS ONE EVALUATION: CPT | Performed by: INTERNAL MEDICINE

## 2024-02-29 RX ORDER — SODIUM CHLORIDE 450 MG/100ML
INJECTION, SOLUTION INTRAVENOUS CONTINUOUS
Status: CANCELLED | OUTPATIENT
Start: 2024-02-29

## 2024-02-29 RX ORDER — ALBUTEROL SULFATE 90 UG/1
2 AEROSOL, METERED RESPIRATORY (INHALATION) EVERY 6 HOURS PRN
Status: DISCONTINUED | OUTPATIENT
Start: 2024-02-29 | End: 2024-02-29

## 2024-02-29 RX ORDER — CETIRIZINE HYDROCHLORIDE 10 MG/1
10 TABLET ORAL DAILY
Status: DISCONTINUED | OUTPATIENT
Start: 2024-02-29 | End: 2024-03-08 | Stop reason: HOSPADM

## 2024-02-29 RX ORDER — ALBUTEROL SULFATE 90 UG/1
2 AEROSOL, METERED RESPIRATORY (INHALATION) EVERY 6 HOURS PRN
Status: DISCONTINUED | OUTPATIENT
Start: 2024-02-29 | End: 2024-03-08 | Stop reason: HOSPADM

## 2024-02-29 RX ORDER — MIDAZOLAM HYDROCHLORIDE 1 MG/ML
1 INJECTION INTRAMUSCULAR; INTRAVENOUS ONCE
Status: CANCELLED | OUTPATIENT
Start: 2024-02-29 | End: 2024-02-29

## 2024-02-29 RX ORDER — LIDOCAINE 40 MG/G
CREAM TOPICAL ONCE
Status: CANCELLED | OUTPATIENT
Start: 2024-02-29 | End: 2024-02-29

## 2024-02-29 RX ADMIN — CEFTRIAXONE SODIUM 1000 MG: 1 INJECTION, POWDER, FOR SOLUTION INTRAMUSCULAR; INTRAVENOUS at 15:05

## 2024-02-29 RX ADMIN — CLONIDINE HYDROCHLORIDE 0.3 MG: 0.2 TABLET ORAL at 20:01

## 2024-02-29 RX ADMIN — FOLIC ACID 1 MG: 1 TABLET ORAL at 14:56

## 2024-02-29 RX ADMIN — HYDRALAZINE HYDROCHLORIDE 50 MG: 50 TABLET ORAL at 20:01

## 2024-02-29 RX ADMIN — HEPARIN SODIUM 5000 UNITS: 5000 INJECTION INTRAVENOUS; SUBCUTANEOUS at 05:51

## 2024-02-29 RX ADMIN — CLONIDINE HYDROCHLORIDE 0.3 MG: 0.2 TABLET ORAL at 13:15

## 2024-02-29 RX ADMIN — VERAPAMIL HYDROCHLORIDE 240 MG: 240 TABLET, FILM COATED, EXTENDED RELEASE ORAL at 13:16

## 2024-02-29 RX ADMIN — SERTRALINE 100 MG: 100 TABLET, FILM COATED ORAL at 14:56

## 2024-02-29 RX ADMIN — HYDRALAZINE HYDROCHLORIDE 50 MG: 50 TABLET ORAL at 05:51

## 2024-02-29 RX ADMIN — CETIRIZINE HYDROCHLORIDE 10 MG: 10 TABLET ORAL at 18:11

## 2024-02-29 RX ADMIN — AZITHROMYCIN DIHYDRATE 250 MG: 250 TABLET ORAL at 13:15

## 2024-02-29 RX ADMIN — HYDRALAZINE HYDROCHLORIDE 50 MG: 50 TABLET ORAL at 13:15

## 2024-02-29 RX ADMIN — ATORVASTATIN CALCIUM 40 MG: 40 TABLET, FILM COATED ORAL at 20:01

## 2024-02-29 RX ADMIN — FAMOTIDINE 20 MG: 20 TABLET, FILM COATED ORAL at 14:58

## 2024-02-29 RX ADMIN — CINACALCET HYDROCHLORIDE 150 MG: 30 TABLET, FILM COATED ORAL at 14:58

## 2024-02-29 RX ADMIN — SODIUM CHLORIDE, PRESERVATIVE FREE 10 ML: 5 INJECTION INTRAVENOUS at 14:59

## 2024-02-29 RX ADMIN — CALCITRIOL CAPSULES 0.25 MCG 0.5 MCG: 0.25 CAPSULE ORAL at 14:56

## 2024-02-29 RX ADMIN — ALBUTEROL SULFATE 2 PUFF: 90 AEROSOL, METERED RESPIRATORY (INHALATION) at 11:10

## 2024-02-29 RX ADMIN — ISOSORBIDE MONONITRATE 120 MG: 60 TABLET, EXTENDED RELEASE ORAL at 14:58

## 2024-02-29 RX ADMIN — Medication 4.8 MG: at 03:41

## 2024-02-29 RX ADMIN — FERROUS SULFATE TAB 325 MG (65 MG ELEMENTAL FE) 325 MG: 325 (65 FE) TAB at 18:11

## 2024-02-29 RX ADMIN — ISOSORBIDE MONONITRATE 120 MG: 60 TABLET, EXTENDED RELEASE ORAL at 20:01

## 2024-02-29 RX ADMIN — SODIUM CHLORIDE, PRESERVATIVE FREE 10 ML: 5 INJECTION INTRAVENOUS at 23:43

## 2024-02-29 NOTE — PROGRESS NOTES
Pt was in bed and was alone at the time of the visit. After three respective attempts to see him, I succeeded on the fourth. He was at dialysis. He was encouraged and blessed.    02/29/24 1612   Encounter Summary   Encounter Overview/Reason  Initial Encounter   Service Provided For: Patient   Referral/Consult From: [facility]   Support System Family members   Last Encounter  02/29/24   Complexity of Encounter Low   Begin Time 1550   End Time  1555   Total Time Calculated 5 min   Spiritual/Emotional needs   Type Spiritual Support   Assessment/Intervention/Outcome   Assessment Hopeful   Intervention Empowerment   Outcome Encouraged

## 2024-02-29 NOTE — FLOWSHEET NOTE
02/29/24 1330   Vital Signs   BP (!) 209/103   Temp 98 °F (36.7 °C)   Pulse 91   Respirations 18   Weight - Scale 93 kg (205 lb 0.4 oz)   Weight Method Bed scale   Percent Weight Change -1.38   Post-Hemodialysis Assessment   Post-Treatment Procedures Blood returned;Access bleeding time < 10 minutes   Machine Disinfection Process Acid/Vinegar Clean;Heat Disinfect;Exterior Machine Disinfection   Rinseback Volume (ml) 400 ml   Blood Volume Processed (Liters) 133.5 L   Dialyzer Clearance Lightly streaked   Duration of Treatment (minutes) 270 minutes   Heparin Amount Administered During Treatment (mL) 0 mL   Hemodialysis Intake (ml) 400 ml   Hemodialysis Output (ml) 1700 ml   NET Removed (ml) 1300     4.5 hour treatment completed. 1.3 L of fluid removed to get to post weight. Pressure held to access for 10 mins x2. Dressing clean/dry and intact upon leaving the unit. Report given to primary RN. Charting printed and placed in bin to be scanned into EMR.

## 2024-02-29 NOTE — PROGRESS NOTES
[facility]   Nephrology progress note  2/29/2024, 10:15 AM      Pt Name:    Trav Jennings  MRN:     [id number]     YOB: 1965  Admit Date:    2/26/2024 12:25 PM    Chief Complaint: Nephrology following for ESRD and hyperkalemia and fluid overload.    Subjective:  Patient seen and examined  No chest pain.  Shortness of breath is much better  Patient is having some cough    Objective:  24HR INTAKE/OUTPUT:    Intake/Output Summary (Last 24 hours) at 2/29/2024 1015  Last data filed at 2/29/2024 0345  Gross per 24 hour   Intake 650 ml   Output 900 ml   Net -250 ml        Admission weight: 94.6 kg (208 lb 8.9 oz)  Wt Readings from Last 3 Encounters:   02/29/24 94.3 kg (207 lb 14.3 oz)   02/16/24 92.6 kg (204 lb 2.3 oz)   02/06/24 94.4 kg (208 lb 1.8 oz)        Vitals :   Vitals:    02/28/24 2045 02/28/24 2300 02/29/24 0345 02/29/24 0838   BP: (!) 153/84 (!) 144/89 (!) 160/77 (!) 176/85   Pulse: 96 84 94 92   Resp: 18 18 18 19   Temp: 98.6 °F (37 °C) 98.5 °F (36.9 °C) 98 °F (36.7 °C) 98.1 °F (36.7 °C)   TempSrc: Oral Oral Oral    SpO2: 97% 98% 97% 96%   Weight:    94.3 kg (207 lb 14.3 oz)   Height:           Physical examination  General Appearance:  Well developed. No distress  Mouth/Throat:  Oral mucosa moist  Neck:  Supple, no JVD  Lungs:  Breath sounds: clear  Heart::  S1,S2 heard  Abdomen:  Soft, non - tender  Musculoskeletal:  Edema -resolved    Medications:  Infusion:    sodium chloride       Meds:    cefTRIAXone (ROCEPHIN) IV  1,000 mg IntraVENous Q24H    azithromycin  250 mg Oral Daily    albuterol  2.5 mg Nebulization Once    aspirin  81 mg Oral Daily    atorvastatin  40 mg Oral Nightly    calcitRIOL  0.5 mcg Oral Once per day on Tue Thu Sat    cinacalcet  150 mg Oral Once per day on Tue Thu Sat    cloNIDine  0.3 mg Oral TID    famotidine  20 mg Oral Daily    ferrous sulfate  325 mg Oral TID WC    folic acid  1 mg Oral Daily    hydrALAZINE  50 mg Oral 3 times per day     isosorbide mononitrate  120 mg Oral BID    sertraline  100 mg Oral Daily    verapamil  240 mg Oral Daily    sodium chloride flush  5-40 mL IntraVENous 2 times per day    heparin (porcine)  5,000 Units SubCUTAneous 3 times per day    nicotine  1 patch TransDERmal Q24H       Lab Data :  CBC:   Recent Labs     02/26/24  1255   WBC 6.6   HGB 10.3*   HCT 34.4*          CMP:  Recent Labs     02/27/24  0413 02/28/24  0412 02/29/24  0558    139 139   K 6.6* 5.7* 5.9*    103 103   CO2 27 26 25   BUN 44* 35* 29*   CREATININE 6.3* 5.5* 4.7*   GLUCOSE 115* 133* 102   CALCIUM 7.7* 7.2* 7.6*       Hepatic:   Recent Labs     02/27/24 0413   LABALBU 3.4*   AST 14   ALT 10*   BILITOT 0.4   ALKPHOS 97         Assessment and Plan:  Renal -ESRD on hemodialysis Tuesday Thursday Saturday  Currently with hyperkalemia and fluid overload status post HD yesterday  Status post dialysis 3 days in a row but potassium is still high he is not having good clearance  Seen during dialysis tolerating well blood pressure slightly high at 187 UF of 1700 mL blood flow rate of 500 with a venous pressure of 210  Most likely an issue with the access.  We are getting a fistulogram.  Electrolytes -status HD on a 2K bath today continues to have hyperkalemia  Essential hypertension doing much better  Diabetes mellitus  Anemia of renal dysfunction  Meds reviewed and discussed with patient and HD staff    Ottoniel Pruett MD  [facility]    This report has been created using voice recognition software. It may contain minor errors which are inherent in voice recognition technology

## 2024-02-29 NOTE — PROGRESS NOTES
Component Value Date/Time    LABGRAM  10/10/2022 03:25 PM     Rare segmented neutrophils observed. Rare epithelial cells observed. No organisms observed.       MRSA culture only:No results found for: \"MRSAC\"    Urine culture: No results found for: \"LABURIN\"    Respiratory culture: No results found for: \"CULTRESP\"    Aerobic and Anaerobic :  Lab Results   Component Value Date/Time    LABAERO No Acinetobacter species isolated. 12/24/2023 12:01 AM     Lab Results   Component Value Date/Time    LABANAE  10/10/2022 03:25 PM     No anaerobes isolated- preliminary No anaerobes isolated       Urinalysis:      Lab Results   Component Value Date/Time    NITRU NEGATIVE 04/03/2018 07:45 PM    WBCUA 2-4 04/03/2018 07:45 PM    BACTERIA NONE 04/03/2018 07:45 PM    RBCUA 5-10 04/03/2018 07:45 PM    BLOODU SMALL 04/03/2018 07:45 PM    SPECGRAV 1.014 03/07/2018 09:10 PM    GLUCOSEU NEGATIVE 04/03/2018 07:45 PM       Radiology:  XR CHEST PORTABLE   Final Result   1. Small bilateral pleural effusions with adjacent atelectasis/infiltrate.   2. Moderate stable cardiomegaly.               **This report has been created using voice recognition software. It may contain minor errors which are inherent in voice recognition technology.**      Final report electronically signed by Dr. Ger Juan on 2/26/2024 1:05 PM      IR FISTULAGRAM    (Results Pending)     XR CHEST PORTABLE    Result Date: 2/26/2024  PROCEDURE: XR CHEST PORTABLE CLINICAL INFORMATION: Shortness of breath TECHNIQUE: Mobile AP chest radiographs. COMPARISON: Mobile AP chest radiographs 2/16/2024 FINDINGS: There is moderate stable enlargement of the cardiac silhouette. An endovascular stent is present in the thoracic aorta. There are stable widening of the mediastinum. The bilateral costophrenic angles are blunted. There are hazy and reticular opacities at the bilateral mid and lower lungs. Degenerative changes in the thoracic spine are poorly visualized.     1. Small  bilateral pleural effusions with adjacent atelectasis/infiltrate. 2. Moderate stable cardiomegaly. **This report has been created using voice recognition software. It may contain minor errors which are inherent in voice recognition technology.** Final report electronically signed by Dr. Ger Juan on 2/26/2024 1:05 PM      Electronically signed by Ely Preston MD on 2/29/2024 at 1:05 PM

## 2024-03-01 ENCOUNTER — APPOINTMENT (OUTPATIENT)
Dept: INTERVENTIONAL RADIOLOGY/VASCULAR | Age: 59
End: 2024-03-01
Payer: MEDICARE

## 2024-03-01 LAB — POTASSIUM SERPL-SCNC: 5.5 MEQ/L (ref 3.5–5.2)

## 2024-03-01 PROCEDURE — 84132 ASSAY OF SERUM POTASSIUM: CPT

## 2024-03-01 PROCEDURE — 36901 INTRO CATH DIALYSIS CIRCUIT: CPT

## 2024-03-01 PROCEDURE — 05763ZZ DILATION OF LEFT SUBCLAVIAN VEIN, PERCUTANEOUS APPROACH: ICD-10-PCS | Performed by: INTERNAL MEDICINE

## 2024-03-01 PROCEDURE — 2500000003 HC RX 250 WO HCPCS: Performed by: RADIOLOGY

## 2024-03-01 PROCEDURE — 2580000003 HC RX 258: Performed by: NURSE PRACTITIONER

## 2024-03-01 PROCEDURE — 36907 BALO ANGIOP CTR DIALYSIS SEG: CPT

## 2024-03-01 PROCEDURE — 99232 SBSQ HOSP IP/OBS MODERATE 35: CPT | Performed by: INTERNAL MEDICINE

## 2024-03-01 PROCEDURE — 6360000004 HC RX CONTRAST MEDICATION: Performed by: RADIOLOGY

## 2024-03-01 PROCEDURE — B51W1ZZ FLUOROSCOPY OF DIALYSIS SHUNT/FISTULA USING LOW OSMOLAR CONTRAST: ICD-10-PCS | Performed by: INTERNAL MEDICINE

## 2024-03-01 PROCEDURE — 2580000003 HC RX 258: Performed by: RADIOLOGY

## 2024-03-01 PROCEDURE — 6370000000 HC RX 637 (ALT 250 FOR IP): Performed by: NURSE PRACTITIONER

## 2024-03-01 PROCEDURE — 6360000002 HC RX W HCPCS: Performed by: RADIOLOGY

## 2024-03-01 PROCEDURE — 6370000000 HC RX 637 (ALT 250 FOR IP): Performed by: INTERNAL MEDICINE

## 2024-03-01 PROCEDURE — C1725 CATH, TRANSLUMIN NON-LASER: HCPCS

## 2024-03-01 PROCEDURE — 6360000002 HC RX W HCPCS: Performed by: NURSE PRACTITIONER

## 2024-03-01 PROCEDURE — C1769 GUIDE WIRE: HCPCS

## 2024-03-01 PROCEDURE — 1200000000 HC SEMI PRIVATE

## 2024-03-01 PROCEDURE — 36415 COLL VENOUS BLD VENIPUNCTURE: CPT

## 2024-03-01 RX ORDER — MIDAZOLAM HYDROCHLORIDE 1 MG/ML
INJECTION INTRAMUSCULAR; INTRAVENOUS PRN
Status: COMPLETED | OUTPATIENT
Start: 2024-03-01 | End: 2024-03-01

## 2024-03-01 RX ORDER — HEPARIN SODIUM 1000 [USP'U]/ML
INJECTION, SOLUTION INTRAVENOUS; SUBCUTANEOUS PRN
Status: COMPLETED | OUTPATIENT
Start: 2024-03-01 | End: 2024-03-01

## 2024-03-01 RX ORDER — SODIUM CHLORIDE 450 MG/100ML
INJECTION, SOLUTION INTRAVENOUS CONTINUOUS
Status: DISCONTINUED | OUTPATIENT
Start: 2024-03-01 | End: 2024-03-03

## 2024-03-01 RX ORDER — LIDOCAINE HYDROCHLORIDE 20 MG/ML
INJECTION, SOLUTION EPIDURAL; INFILTRATION; INTRACAUDAL; PERINEURAL PRN
Status: COMPLETED | OUTPATIENT
Start: 2024-03-01 | End: 2024-03-01

## 2024-03-01 RX ORDER — MIDAZOLAM HYDROCHLORIDE 1 MG/ML
1 INJECTION INTRAMUSCULAR; INTRAVENOUS ONCE
Status: DISCONTINUED | OUTPATIENT
Start: 2024-03-01 | End: 2024-03-08 | Stop reason: HOSPADM

## 2024-03-01 RX ORDER — LIDOCAINE 40 MG/G
CREAM TOPICAL ONCE
Status: DISCONTINUED | OUTPATIENT
Start: 2024-03-01 | End: 2024-03-08 | Stop reason: HOSPADM

## 2024-03-01 RX ADMIN — AZITHROMYCIN DIHYDRATE 250 MG: 250 TABLET ORAL at 08:17

## 2024-03-01 RX ADMIN — HEPARIN SODIUM 2000 UNITS: 1000 INJECTION INTRAVENOUS; SUBCUTANEOUS at 10:07

## 2024-03-01 RX ADMIN — FOLIC ACID 1 MG: 1 TABLET ORAL at 08:17

## 2024-03-01 RX ADMIN — SODIUM CHLORIDE, PRESERVATIVE FREE 10 ML: 5 INJECTION INTRAVENOUS at 08:17

## 2024-03-01 RX ADMIN — CLONIDINE HYDROCHLORIDE 0.3 MG: 0.2 TABLET ORAL at 08:17

## 2024-03-01 RX ADMIN — SERTRALINE 100 MG: 100 TABLET, FILM COATED ORAL at 08:17

## 2024-03-01 RX ADMIN — CETIRIZINE HYDROCHLORIDE 10 MG: 10 TABLET ORAL at 08:17

## 2024-03-01 RX ADMIN — IOPAMIDOL 105 ML: 510 INJECTION, SOLUTION INTRAVASCULAR at 10:36

## 2024-03-01 RX ADMIN — SODIUM CHLORIDE: 4.5 INJECTION, SOLUTION INTRAVENOUS at 08:14

## 2024-03-01 RX ADMIN — ISOSORBIDE MONONITRATE 120 MG: 60 TABLET, EXTENDED RELEASE ORAL at 19:36

## 2024-03-01 RX ADMIN — ISOSORBIDE MONONITRATE 120 MG: 60 TABLET, EXTENDED RELEASE ORAL at 08:17

## 2024-03-01 RX ADMIN — FERROUS SULFATE TAB 325 MG (65 MG ELEMENTAL FE) 325 MG: 325 (65 FE) TAB at 08:17

## 2024-03-01 RX ADMIN — FERROUS SULFATE TAB 325 MG (65 MG ELEMENTAL FE) 325 MG: 325 (65 FE) TAB at 16:28

## 2024-03-01 RX ADMIN — VERAPAMIL HYDROCHLORIDE 240 MG: 240 TABLET, FILM COATED, EXTENDED RELEASE ORAL at 08:17

## 2024-03-01 RX ADMIN — HYDRALAZINE HYDROCHLORIDE 50 MG: 50 TABLET ORAL at 05:14

## 2024-03-01 RX ADMIN — CLONIDINE HYDROCHLORIDE 0.3 MG: 0.2 TABLET ORAL at 19:36

## 2024-03-01 RX ADMIN — CEFTRIAXONE SODIUM 1000 MG: 1 INJECTION, POWDER, FOR SOLUTION INTRAMUSCULAR; INTRAVENOUS at 11:40

## 2024-03-01 RX ADMIN — HYDROMORPHONE HYDROCHLORIDE 1 MG: 1 INJECTION, SOLUTION INTRAMUSCULAR; INTRAVENOUS; SUBCUTANEOUS at 09:19

## 2024-03-01 RX ADMIN — FERROUS SULFATE TAB 325 MG (65 MG ELEMENTAL FE) 325 MG: 325 (65 FE) TAB at 11:38

## 2024-03-01 RX ADMIN — ASPIRIN 81 MG: 81 TABLET, COATED ORAL at 08:17

## 2024-03-01 RX ADMIN — HYDRALAZINE HYDROCHLORIDE 50 MG: 50 TABLET ORAL at 13:43

## 2024-03-01 RX ADMIN — LIDOCAINE HYDROCHLORIDE 20 ML: 20 INJECTION, SOLUTION EPIDURAL; INFILTRATION; INTRACAUDAL; PERINEURAL at 10:12

## 2024-03-01 RX ADMIN — FAMOTIDINE 20 MG: 20 TABLET, FILM COATED ORAL at 08:17

## 2024-03-01 RX ADMIN — MIDAZOLAM 1 MG: 1 INJECTION INTRAMUSCULAR; INTRAVENOUS at 09:19

## 2024-03-01 RX ADMIN — ATORVASTATIN CALCIUM 40 MG: 40 TABLET, FILM COATED ORAL at 19:36

## 2024-03-01 RX ADMIN — HYDRALAZINE HYDROCHLORIDE 50 MG: 50 TABLET ORAL at 22:00

## 2024-03-01 RX ADMIN — CLONIDINE HYDROCHLORIDE 0.3 MG: 0.2 TABLET ORAL at 13:43

## 2024-03-01 ASSESSMENT — PAIN DESCRIPTION - DESCRIPTORS: DESCRIPTORS: ACHING

## 2024-03-01 ASSESSMENT — PAIN DESCRIPTION - LOCATION: LOCATION: BACK

## 2024-03-01 ASSESSMENT — PAIN SCALES - GENERAL: PAINLEVEL_OUTOF10: 4

## 2024-03-01 ASSESSMENT — PAIN - FUNCTIONAL ASSESSMENT: PAIN_FUNCTIONAL_ASSESSMENT: ACTIVITIES ARE NOT PREVENTED

## 2024-03-01 ASSESSMENT — PAIN DESCRIPTION - PAIN TYPE: TYPE: CHRONIC PAIN

## 2024-03-01 NOTE — PROGRESS NOTES
Perfect serve to Dr. Preston to relay message from case management/social work that patient will need a new order for home oxygen and an order for a home oxygen evaluation prior to discharge.

## 2024-03-01 NOTE — OP NOTE
Department of Radiology  Post Procedure Progress Note      Pre-Procedure Diagnosis: Malfunctioning dialysis fistula/graft     Procedure Performed:  Dialysis fistulagram with angioplasty     Anesthesia: local / versed and dilaudid    Findings: successful    Immediate Complications:  None    Estimated Blood Loss: minimal    SEE DICTATED PROCEDURE NOTE FOR COMPLETE DETAILS.    Yovany Perla MD   3/1/2024 10:22 AM

## 2024-03-01 NOTE — H&P
[facility]  Sedation/Analgesia History & Physical    Pt Name: Trav Jennings  MRN: [id number]  YOB: 1965  Provider Performing Procedure: Yovany Perla MD, MD  Primary Care Physician: Radha Hou APRN - CNP    Formulation and discussion of sedation / procedure plans, risks, benefits, side effects and alternatives with patient and/or responsible adult completed.    PRE-PROCEDURE   DNR-CCA/DNR-CC []Yes [x]No  Brief History/Pre-Procedure Diagnosis: Malfunctioning dialysis fistula/graft           MEDICAL HISTORY  []CAD/Valve  []Liver Disease  []Lung Disease []Diabetes  []Hypertension []Renal Disease  []Additional information:       has a past medical history of AAA (abdominal aortic aneurysm) ([facility]), NURIS (acute kidney injury) ([facility]), Anemia associated with chronic renal failure, Arthritis, Cocaine abuse ([facility]), Depression, Diabetes mellitus ([facility]), FSGS (focal segmental glomerulosclerosis), Hemodialysis patient ([facility]), Hemorrhoids, History of blood transfusion, Hyperlipidemia, Hyperphosphatemia, Hypertension, Left renal artery stenosis (HCC), Monoclonal (M) protein disease, multiple 'M' protein, Nicotine dependence, Noncompliance, Pneumonia, Psychiatric problem, PTSD (post-traumatic stress disorder), Secondary hyperparathyroidism (of renal origin), and Sleep apnea.    SURGICAL HISTORY   has a past surgical history that includes Leg Tendon Surgery; EKG 12 Lead (9/24/2015); Dialysis fistula creation (Left, 07/08/2016); vascular surgery (Left, 07/08/2016); vascular surgery (Right, 1990); Hand tendon surgery (Left, 4/5/2019); Abdominal aortic aneurysm repair; bronchoscopy (N/A, 10/6/2022); and thoracotomy (Right, 10/10/2022).  Additional information:       ALLERGIES   Allergies as of 02/26/2024 - Fully Reviewed 02/26/2024   Allergen Reaction Noted    Humalog [insulin lispro] Other (See Comments) 03/17/2018    Insulin regular human Hives 06/06/2018    Lisinopril Swelling 03/09/2018  Insertion  [x]Fistulogram []IV access       []Vertebroplasty / Augmentation  []IVC filter []Dialysis catheter []Biliary drainage  []Other: []CAPD Catheter []Nephrostomy Tube / Stent  SEDATION  Planned agent:[x]Midazolam []Meperidine []Sublimaze [x]Dilaudid []Morphine     []Diazepam  []Other:     ASA Classification:  []1 [x]2 []3 []4 []5  Class 1: A normal healthy patient  Class 2: Pt with mild to moderate systemic disease  Class 3: Severe systemic disease or disturbance  Class 4: Severe systemic disorders that are already life threatening.  Class 5: Moribund pt with little chances of survival, for more than 24 hours.  Mallampati I Airway Classification:   []1 [x]2 []3 []4    [x]Pre-procedure diagnostic studies complete and results available.   Comment:    [x]Previous sedation/anesthesia experiences assessed.   Comment:    [x]The patient is an appropriate candidate to undergo the planned procedure sedation and anesthesia. (Refer to nursing sedation/analgesia documentation record)  [x]Formulation and discussion of sedation/procedure plan, risks, and expectations with patient and/or responsible adult completed.  [x]Patient examined immediately prior to the procedure. (Refer to nursing sedation/analgesia documentation record)    Yovany Perla MD, MD  Electronically signed 3/1/2024 at 8:04 AM

## 2024-03-01 NOTE — CARE COORDINATION
3/1/24, 3:06 PM EST    DISCHARGE PLANNING EVALUATION       SW contacted by GARLAND Jung that Patient had questions in relationship to dialysis and going to [place]. SW spoke with Patient and spouse and they have been speaking with [facility] alcohol/drug rehab program and that Patient wants to discharge to them if possible.  Patient then connected SW with Alen Hamilton  at [facility] at [phone] fx [phone]. Gordo stated that Intake will need medical records and things along with a d/c date to arrange for transportation as well. Gordo stated that they are located in [place] but that Patient will be going to [facility] for dialysis.  Gordo stated that they are in the process of working all of that out at this time.  MICHAEL then spoke with Tommy with Intake at [phone] fx [phone].  Tommy is in need of information regarding Patient's current admission, meds, and cardiac history.  CM assisting MICHAEL in faxing all information to the [facility] today.  MICHAEL and Intake to speak again on Monday am regarding bed availability and d/c arrangements.

## 2024-03-01 NOTE — H&P
Formulation and discussion of sedation / procedure plans, risks, benefits, side effects and alternatives with patient and/or responsible adult completed.    History and Physical reviewed and unchanged.    Electronically signed by Yovany Perla MD on 3/1/24 at 8:04 AM EST

## 2024-03-01 NOTE — PROGRESS NOTES
[facility]   Nephrology progress note  3/1/2024, 10:11 AM      Pt Name:    Trav Jennings  MRN:     [id number]     YOB: 1965  Admit Date:    2/26/2024 12:25 PM    Chief Complaint: Nephrology following for ESRD and hyperkalemia and fluid overload.    Subjective:  Patient seen and examined  No chest pain.  Shortness of breath is much better  Awaiting a fistulogram later today    Objective:  24HR INTAKE/OUTPUT:    Intake/Output Summary (Last 24 hours) at 3/1/2024 1011  Last data filed at 2/29/2024 2126  Gross per 24 hour   Intake 1100 ml   Output 1700 ml   Net -600 ml        Admission weight: 94.6 kg (208 lb 8.9 oz)  Wt Readings from Last 3 Encounters:   02/29/24 93 kg (205 lb 0.4 oz)   02/16/24 92.6 kg (204 lb 2.3 oz)   02/06/24 94.4 kg (208 lb 1.8 oz)        Vitals :   Vitals:    03/01/24 0955 03/01/24 1000 03/01/24 1005 03/01/24 1010   BP: 139/88 111/76 (!) 135/90 137/81   Pulse: 85 82 85 88   Resp: 12 15 12 14   Temp:       TempSrc:       SpO2: 96% 96% 96% 97%   Weight:       Height:           Physical examination  General Appearance:  Well developed. No distress  Mouth/Throat:  Oral mucosa moist  Neck:  Supple, no JVD  Lungs:  Breath sounds: clear  Heart::  S1,S2 heard  Abdomen:  Soft, non - tender  Musculoskeletal:  Edema -resolved    Medications:  Infusion:    sodium chloride 20 mL/hr at 03/01/24 0814    sodium chloride       Meds:    ceFAZolin  2,000 mg IntraVENous Once    HYDROmorphone  1 mg IntraVENous Once    lidocaine   Topical Once    midazolam  1 mg IntraVENous Once    cetirizine  10 mg Oral Daily    cefTRIAXone (ROCEPHIN) IV  1,000 mg IntraVENous Q24H    azithromycin  250 mg Oral Daily    albuterol  2.5 mg Nebulization Once    aspirin  81 mg Oral Daily    atorvastatin  40 mg Oral Nightly    calcitRIOL  0.5 mcg Oral Once per day on Tue Thu Sat    cinacalcet  150 mg Oral Once per day on Tue Thu Sat    cloNIDine  0.3 mg Oral TID    famotidine  20 mg Oral Daily    ferrous  sulfate  325 mg Oral TID     folic acid  1 mg Oral Daily    hydrALAZINE  50 mg Oral 3 times per day    isosorbide mononitrate  120 mg Oral BID    sertraline  100 mg Oral Daily    verapamil  240 mg Oral Daily    sodium chloride flush  5-40 mL IntraVENous 2 times per day    [Held by provider] heparin (porcine)  5,000 Units SubCUTAneous 3 times per day    nicotine  1 patch TransDERmal Q24H       Lab Data :  CBC:   No results for input(s): \"WBC\", \"HGB\", \"HCT\", \"PLT\" in the last 72 hours.    CMP:  Recent Labs     02/28/24  0412 02/29/24  0558    139   K 5.7* 5.9*    103   CO2 26 25   BUN 35* 29*   CREATININE 5.5* 4.7*   GLUCOSE 133* 102   CALCIUM 7.2* 7.6*       Hepatic:   No results for input(s): \"LABALBU\", \"AST\", \"ALT\", \"ALB\", \"BILITOT\", \"ALKPHOS\" in the last 72 hours.      Assessment and Plan:  Renal -ESRD on hemodialysis Tuesday Thursday Saturday  Currently with hyperkalemia and fluid overload status post HD yesterday  Status post dialysis 3 days in a row but potassium is still high he is not having good clearance  Having a fistulogram today.  No new labs will check a BMP after the fistulogram if potassium is high we will get him dialyzed  Monitor BMP  Electrolytes -status HD on a 2K bath today continues to have hyperkalemia labs today  Essential hypertension doing much better  Diabetes mellitus  Anemia of renal dysfunction  Meds reviewed and discussed with patient     Ottoniel Pruett MD  [facility]    This report has been created using voice recognition software. It may contain minor errors which are inherent in voice recognition technology

## 2024-03-01 NOTE — PROGRESS NOTES
Hospitalist Progress Note      Patient:  Trav Jennings    Unit/Bed:6K-13/013-A  YOB: 1965  MRN: [id number]   Acct: [id number]   PCP: Radha Hou APRN - CNP  Date of Admission: 2/26/2024    Assessment/Plan:    # Volume Overload.  -patient told nephrology on 2/26 that he was noncompliant with his fluid intake over the weekend has been drinking a lot of protein shakes.  --BNP noted to be 37,826;  -HD as scheduled on 2/24, had HD on 2/26, 2/27,2/28,2/29 .  -Resolved.    #Hypertensive urgency  -likely from volume overload.  -Resolved    #Hyperkalemia-  -[place], HD.  -Nephrology is following.    #Possible pneumonia, bilateral lower lobes (POA).  -Possibly secondary to gram-negative bacilli--procalcitonin noted be 0.9,   -Continue with Rocephin , Zithromax.  -Encourage incentive spirometry and Acapella    #ESRD on HD--on Tuesday/Thursday/Saturday.  -Patient to get fistulogram today.  -Dialysis as per nephrology.     #Primary hypertension, uncontrolled.  -On Catapres, hydralazine, Imdur, verapamil.  -Hydralazine 10 mg IV push every 6 hours as needed systolic blood pressure greater than 170, monitor    #Small bilateral pleural effusions.  -Back on his baseline oxygen needs.    #Obstructive hypertrophic cardiomyopathy secondary to uncontrolled hypertension.  -ECHO from May 24, 2023 revealed an EF 65 to 70% with marked LVOT obstruction    #Diabetes mellitus type 2, controlled.  -A1c 5.5 on December 25, 2023.  -Continue with sl/sc coverage.Hypoglycemia protocol.    #Hyperlipidemia.  -On statin.    #Chronic AAA.  -Follows with vascular surgery in [place]    #History of cocaine use--denies recent use    #Secondary hyperparathyroidism--on calcitriol    #Possible REZA.  -Sleep study  outpatient     #Acute on chronic chronic hypoxic respiratory failure.  -On 3 L of oxygen around-the-clock at home.  - will likely need repeat home oxygen  is moderate stable enlargement of the cardiac silhouette. An endovascular stent is present in the thoracic aorta. There are stable widening of the mediastinum. The bilateral costophrenic angles are blunted. There are hazy and reticular opacities at the bilateral mid and lower lungs. Degenerative changes in the thoracic spine are poorly visualized.     1. Small bilateral pleural effusions with adjacent atelectasis/infiltrate. 2. Moderate stable cardiomegaly. **This report has been created using voice recognition software. It may contain minor errors which are inherent in voice recognition technology.** Final report electronically signed by Dr. Ger Juan on 2/26/2024 1:05 PM      Electronically signed by Ely Preston MD on 3/1/2024 at 4:55 PM

## 2024-03-01 NOTE — PROGRESS NOTES
0840 Patient received in IR for fistulogram.   0845 This procedure has been fully reviewed with the patient and written informed consent has been obtained.  0919 Procedure started with Dr. Perla.   0921 First access obtained.   0935 Second access with use of sonosite obtained.   1002 Angioplasty of the subclavian vein with an 0.35 [place] 10 x 40 balloon.   1020 Procedure completed; patient tolerated well. Slip knots x2. Pt did have some bleeding extravasation noted. Manual pressure held for 15 minutes. Bleeding controlled.   1040 No bleeding noted to left arm fistula site. Coban wrapped pressure dressing applied over lower slip knot to ensure hemostasis.   1045 Pt sat up on IR table despite being told not to and left arm fistula site was noted to be bleeding/extravasating again. Dr Perla aware. Manual pressure reapplied to arm for additional 15 minutes. Patient on bed; comfort ensured.  1105 Manual pressure off of left arm fistula site. No bleeding or extravasation noted. Site soft to touch.   1110 Blood pressure cuff to left arm fistula site and pumped to 175mmHg and held per Dr Perla.   1114 Patient taken to 6K via bed.

## 2024-03-01 NOTE — CARE COORDINATION
3/1/24, 1:14 PM EST    DISCHARGE ON GOING EVALUATION    Trav Jennings       Hospital day: 4  Location: [facility] Reason for admit: Hyperkalemia [E87.5]  Elevated troponin [R79.89]  Elevated brain natriuretic peptide (BNP) level [R79.89]  Hypervolemia, unspecified hypervolemia type [E87.70]  Acute on chronic congestive heart failure, unspecified heart failure type (HCC) [I50.9]   Procedure:   cxray: Small bilateral pleural effusions with adjacent atelectasis/infiltrate    3/1 fistulagram with angioplasty  Barriers to Discharge: Repeat K+ is 5.5. Dr. Pruett is considering dialyzing again today. IV rocephin, po zithromax. Home oxygen eval needs repeated prior to discharge, as patient's home oxygen orders have , notified bella Selby RN.   PCP: Radha Hou APRN - CNP  Readmission Risk Score: 27.5%  Patient Goals/Plan/Treatment Preferences: Home with wife. TTS HD at [facility]. Has home oxygen from  DME.     *Update: Mayra RODRIGUEZ informs that patient is working with [facility] on admission to [facility]. [facility] aware that [facility] does require HD, and is working out arrangements. They will not have a bed for him until Wednesday. Updated Dr. Preston and let him know patient can discharge to home this weekend, if stable, and [facility]. Requested documentation faxed to Tommy at [facility] (fax [phone]).     3/1/24, 1:34 PM EST    Possible discharge this weekend.   Patient goals/plan/ treatment preferences discussed by  and .  Patient goals/plan/ treatment preferences reviewed with patient/ family.  Patient/ family verbalize understanding of discharge plan and are in agreement with goal/plan/treatment preferences.  Understanding was demonstrated using the teach back method.  AVS provided by RN at time of discharge, which includes all necessary medical information pertaining to the patients current course of illness, treatment, post-discharge  goals of care, and treatment preferences.     Services At/After Discharge: None       IMM Letter  IMM Letter given to Patient/Family/Significant other/Guardian/POA/by:: Kiet HUMPHRIES  IMM Letter date given:: 03/01/24  IMM Letter time given:: 1010

## 2024-03-02 LAB
ANION GAP SERPL CALC-SCNC: 13 MEQ/L (ref 8–16)
BASOPHILS ABSOLUTE: 0 THOU/MM3 (ref 0–0.1)
BASOPHILS NFR BLD AUTO: 0.9 %
BUN SERPL-MCNC: 48 MG/DL (ref 7–22)
CALCIUM SERPL-MCNC: 7.9 MG/DL (ref 8.5–10.5)
CHLORIDE SERPL-SCNC: 102 MEQ/L (ref 98–111)
CO2 SERPL-SCNC: 23 MEQ/L (ref 23–33)
CREAT SERPL-MCNC: 6.9 MG/DL (ref 0.4–1.2)
DEPRECATED RDW RBC AUTO: 58.2 FL (ref 35–45)
EKG ATRIAL RATE: 83 BPM
EKG P AXIS: 70 DEGREES
EKG P-R INTERVAL: 220 MS
EKG Q-T INTERVAL: 410 MS
EKG QRS DURATION: 110 MS
EKG QTC CALCULATION (BAZETT): 481 MS
EKG R AXIS: -53 DEGREES
EKG T AXIS: 98 DEGREES
EKG VENTRICULAR RATE: 83 BPM
EOSINOPHIL NFR BLD AUTO: 4.4 %
EOSINOPHILS ABSOLUTE: 0.2 THOU/MM3 (ref 0–0.4)
ERYTHROCYTE [DISTWIDTH] IN BLOOD BY AUTOMATED COUNT: 14.5 % (ref 11.5–14.5)
GFR SERPL CREATININE-BSD FRML MDRD: 9 ML/MIN/1.73M2
GLUCOSE SERPL-MCNC: 94 MG/DL (ref 70–108)
HCT VFR BLD AUTO: 34 % (ref 42–52)
HGB BLD-MCNC: 10.1 GM/DL (ref 14–18)
IMM GRANULOCYTES # BLD AUTO: 0.01 THOU/MM3 (ref 0–0.07)
IMM GRANULOCYTES NFR BLD AUTO: 0.2 %
LYMPHOCYTES ABSOLUTE: 0.6 THOU/MM3 (ref 1–4.8)
LYMPHOCYTES NFR BLD AUTO: 14.7 %
MCH RBC QN AUTO: 32.5 PG (ref 26–33)
MCHC RBC AUTO-ENTMCNC: 29.7 GM/DL (ref 32.2–35.5)
MCV RBC AUTO: 109.3 FL (ref 80–94)
MONOCYTES ABSOLUTE: 0.4 THOU/MM3 (ref 0.4–1.3)
MONOCYTES NFR BLD AUTO: 10.1 %
NEUTROPHILS NFR BLD AUTO: 69.7 %
NRBC BLD AUTO-RTO: 0 /100 WBC
PLATELET # BLD AUTO: 108 THOU/MM3 (ref 130–400)
PMV BLD AUTO: 10 FL (ref 9.4–12.4)
POTASSIUM SERPL-SCNC: 4.7 MEQ/L (ref 3.5–5.2)
POTASSIUM SERPL-SCNC: 6.3 MEQ/L (ref 3.5–5.2)
RBC # BLD AUTO: 3.11 MILL/MM3 (ref 4.7–6.1)
SEGMENTED NEUTROPHILS ABSOLUTE COUNT: 3.1 THOU/MM3 (ref 1.8–7.7)
SODIUM SERPL-SCNC: 138 MEQ/L (ref 135–145)
WBC # BLD AUTO: 4.4 THOU/MM3 (ref 4.8–10.8)

## 2024-03-02 PROCEDURE — 6370000000 HC RX 637 (ALT 250 FOR IP): Performed by: INTERNAL MEDICINE

## 2024-03-02 PROCEDURE — 2580000003 HC RX 258: Performed by: NURSE PRACTITIONER

## 2024-03-02 PROCEDURE — 99232 SBSQ HOSP IP/OBS MODERATE 35: CPT | Performed by: INTERNAL MEDICINE

## 2024-03-02 PROCEDURE — 93010 ELECTROCARDIOGRAM REPORT: CPT | Performed by: INTERNAL MEDICINE

## 2024-03-02 PROCEDURE — 6370000000 HC RX 637 (ALT 250 FOR IP): Performed by: NURSE PRACTITIONER

## 2024-03-02 PROCEDURE — 6360000002 HC RX W HCPCS: Performed by: NURSE PRACTITIONER

## 2024-03-02 PROCEDURE — 36415 COLL VENOUS BLD VENIPUNCTURE: CPT

## 2024-03-02 PROCEDURE — 1200000000 HC SEMI PRIVATE

## 2024-03-02 PROCEDURE — 93005 ELECTROCARDIOGRAM TRACING: CPT | Performed by: NURSE PRACTITIONER

## 2024-03-02 PROCEDURE — 85025 COMPLETE CBC W/AUTO DIFF WBC: CPT

## 2024-03-02 PROCEDURE — 80048 BASIC METABOLIC PNL TOTAL CA: CPT

## 2024-03-02 PROCEDURE — 90935 HEMODIALYSIS ONE EVALUATION: CPT | Performed by: INTERNAL MEDICINE

## 2024-03-02 PROCEDURE — 90935 HEMODIALYSIS ONE EVALUATION: CPT

## 2024-03-02 PROCEDURE — 84132 ASSAY OF SERUM POTASSIUM: CPT

## 2024-03-02 RX ORDER — CALCIUM GLUCONATE 10 MG/ML
1000 INJECTION, SOLUTION INTRAVENOUS ONCE
Status: COMPLETED | OUTPATIENT
Start: 2024-03-02 | End: 2024-03-02

## 2024-03-02 RX ORDER — HYDRALAZINE HYDROCHLORIDE 50 MG/1
100 TABLET, FILM COATED ORAL EVERY 8 HOURS SCHEDULED
Status: DISCONTINUED | OUTPATIENT
Start: 2024-03-02 | End: 2024-03-08 | Stop reason: HOSPADM

## 2024-03-02 RX ADMIN — CEFTRIAXONE SODIUM 1000 MG: 1 INJECTION, POWDER, FOR SOLUTION INTRAMUSCULAR; INTRAVENOUS at 13:33

## 2024-03-02 RX ADMIN — CLONIDINE HYDROCHLORIDE 0.3 MG: 0.2 TABLET ORAL at 13:24

## 2024-03-02 RX ADMIN — CALCIUM GLUCONATE 1000 MG: 10 INJECTION, SOLUTION INTRAVENOUS at 07:35

## 2024-03-02 RX ADMIN — FERROUS SULFATE TAB 325 MG (65 MG ELEMENTAL FE) 325 MG: 325 (65 FE) TAB at 13:24

## 2024-03-02 RX ADMIN — AZITHROMYCIN DIHYDRATE 250 MG: 250 TABLET ORAL at 13:23

## 2024-03-02 RX ADMIN — ATORVASTATIN CALCIUM 40 MG: 40 TABLET, FILM COATED ORAL at 19:29

## 2024-03-02 RX ADMIN — ISOSORBIDE MONONITRATE 120 MG: 60 TABLET, EXTENDED RELEASE ORAL at 13:24

## 2024-03-02 RX ADMIN — FAMOTIDINE 20 MG: 20 TABLET, FILM COATED ORAL at 13:29

## 2024-03-02 RX ADMIN — FERROUS SULFATE TAB 325 MG (65 MG ELEMENTAL FE) 325 MG: 325 (65 FE) TAB at 16:23

## 2024-03-02 RX ADMIN — HYDRALAZINE HYDROCHLORIDE 100 MG: 50 TABLET ORAL at 22:11

## 2024-03-02 RX ADMIN — CALCITRIOL CAPSULES 0.25 MCG 0.5 MCG: 0.25 CAPSULE ORAL at 13:24

## 2024-03-02 RX ADMIN — ACETAMINOPHEN 650 MG: 325 TABLET ORAL at 04:44

## 2024-03-02 RX ADMIN — SERTRALINE 100 MG: 100 TABLET, FILM COATED ORAL at 13:24

## 2024-03-02 RX ADMIN — VERAPAMIL HYDROCHLORIDE 240 MG: 240 TABLET, FILM COATED, EXTENDED RELEASE ORAL at 09:52

## 2024-03-02 RX ADMIN — SODIUM CHLORIDE, PRESERVATIVE FREE 10 ML: 5 INJECTION INTRAVENOUS at 19:30

## 2024-03-02 RX ADMIN — HYDRALAZINE HYDROCHLORIDE 10 MG: 20 INJECTION, SOLUTION INTRAMUSCULAR; INTRAVENOUS at 02:01

## 2024-03-02 RX ADMIN — ASPIRIN 81 MG: 81 TABLET, COATED ORAL at 13:29

## 2024-03-02 RX ADMIN — CETIRIZINE HYDROCHLORIDE 10 MG: 10 TABLET ORAL at 13:24

## 2024-03-02 RX ADMIN — CLONIDINE HYDROCHLORIDE 0.3 MG: 0.2 TABLET ORAL at 09:52

## 2024-03-02 RX ADMIN — SODIUM ZIRCONIUM CYCLOSILICATE 5 G: 5 POWDER, FOR SUSPENSION ORAL at 06:31

## 2024-03-02 RX ADMIN — ISOSORBIDE MONONITRATE 120 MG: 60 TABLET, EXTENDED RELEASE ORAL at 19:29

## 2024-03-02 RX ADMIN — CLONIDINE HYDROCHLORIDE 0.3 MG: 0.2 TABLET ORAL at 19:29

## 2024-03-02 RX ADMIN — HYDRALAZINE HYDROCHLORIDE 50 MG: 50 TABLET ORAL at 04:44

## 2024-03-02 RX ADMIN — CINACALCET HYDROCHLORIDE 150 MG: 30 TABLET, FILM COATED ORAL at 13:23

## 2024-03-02 RX ADMIN — HYDRALAZINE HYDROCHLORIDE 100 MG: 50 TABLET ORAL at 13:25

## 2024-03-02 RX ADMIN — FOLIC ACID 1 MG: 1 TABLET ORAL at 13:24

## 2024-03-02 ASSESSMENT — PAIN DESCRIPTION - ORIENTATION: ORIENTATION: LEFT

## 2024-03-02 ASSESSMENT — PAIN DESCRIPTION - LOCATION: LOCATION: ARM

## 2024-03-02 ASSESSMENT — PAIN DESCRIPTION - DESCRIPTORS: DESCRIPTORS: ACHING

## 2024-03-02 ASSESSMENT — PAIN - FUNCTIONAL ASSESSMENT: PAIN_FUNCTIONAL_ASSESSMENT: PREVENTS OR INTERFERES SOME ACTIVE ACTIVITIES AND ADLS

## 2024-03-02 ASSESSMENT — PAIN SCALES - GENERAL
PAINLEVEL_OUTOF10: 5
PAINLEVEL_OUTOF10: 0

## 2024-03-02 NOTE — PLAN OF CARE
intact  Outcome: Progressing  Flowsheets (Taken 3/1/2024 2041)  Skin Integrity Remains Intact:   Monitor for areas of redness and/or skin breakdown   Assess vascular access sites hourly     Problem: Skin/Tissue Integrity - Adult  Goal: Incisions, wounds, or drain sites healing without S/S of infection  Outcome: Progressing  Flowsheets (Taken 3/1/2024 2041)  Incisions, Wounds, or Drain Sites Healing Without Sign and Symptoms of Infection: TWICE DAILY: Assess and document skin integrity     Problem: Skin/Tissue Integrity - Adult  Goal: Oral mucous membranes remain intact  Outcome: Progressing  Flowsheets (Taken 3/1/2024 2041)  Oral Mucous Membranes Remain Intact: Assess oral mucosa and hygiene practices     Problem: Musculoskeletal - Adult  Goal: Return mobility to safest level of function  Outcome: Progressing  Flowsheets (Taken 3/1/2024 2041)  Return Mobility to Safest Level of Function:   Assist with transfers and ambulation using safe patient handling equipment as needed   Assess patient stability and activity tolerance for standing, transferring and ambulating with or without assistive devices   Ensure adequate protection for wounds/incisions during mobilization     Problem: Musculoskeletal - Adult  Goal: Return ADL status to a safe level of function  Outcome: Progressing  Flowsheets (Taken 3/1/2024 2041)  Return ADL Status to a Safe Level of Function:   Administer medication as ordered   Assess activities of daily living deficits and provide assistive devices as needed     Problem: Infection - Adult  Goal: Absence of infection at discharge  Outcome: Progressing  Flowsheets (Taken 3/1/2024 2041)  Absence of infection at discharge:   Assess and monitor for signs and symptoms of infection   Monitor lab/diagnostic results   Monitor all insertion sites i.e., indwelling lines, tubes and drains   Monitor endotracheal (as able) and nasal secretions for changes in amount and color     Problem: Metabolic/Fluid and  Electrolytes - Adult  Goal: Electrolytes maintained within normal limits  Outcome: Progressing  Flowsheets (Taken 3/1/2024 2041)  Electrolytes maintained within normal limits:   Monitor labs and assess patient for signs and symptoms of electrolyte imbalances   Monitor response to electrolyte replacements, including repeat lab results as appropriate   Administer electrolyte replacement as ordered     Problem: Hematologic - Adult  Goal: Maintains hematologic stability  Outcome: Progressing  Flowsheets (Taken 3/1/2024 2041)  Maintains hematologic stability:   Monitor labs for bleeding or clotting disorders   Assess for signs and symptoms of bleeding or hemorrhage     Problem: Genitourinary - Adult  Goal: Absence of urinary retention  Outcome: Progressing  Flowsheets (Taken 3/1/2024 2041)  Absence of urinary retention:   Assess patient’s ability to void and empty bladder   Monitor intake/output and perform bladder scan as needed     Problem: Genitourinary - Adult  Goal: Urinary catheter remains patent  Outcome: Progressing  Flowsheets (Taken 3/1/2024 2041)  Urinary catheter remains patent: Assess patency of urinary catheter     Problem: Skin/Tissue Integrity  Goal: Absence of new skin breakdown  Description: 1.  Monitor for areas of redness and/or skin breakdown  2.  Assess vascular access sites hourly  3.  Every 4-6 hours minimum:  Change oxygen saturation probe site  4.  Every 4-6 hours:  If on nasal continuous positive airway pressure, respiratory therapy assess nares and determine need for appliance change or resting period.  Outcome: Progressing  Note: No new skin breakdown noted.  Encouraged patient to reposition in bed.        Problem: Chronic Conditions and Co-morbidities  Goal: Patient's chronic conditions and co-morbidity symptoms are monitored and maintained or improved  Outcome: Progressing  Flowsheets (Taken 3/1/2024 2041)  Care Plan - Patient's Chronic Conditions and Co-Morbidity Symptoms are Monitored and

## 2024-03-02 NOTE — FLOWSHEET NOTE
03/02/24 1232   Vital Signs   BP (!) 180/92   Temp 97.1 °F (36.2 °C)   Pulse 85   Respirations 20   Weight - Scale 92.9 kg (204 lb 12.9 oz)   Weight Method Bed scale   Percent Weight Change -3.63   Post-Hemodialysis Assessment   Post-Treatment Procedures Blood returned;Access bleeding time < 10 minutes   Machine Disinfection Process Acid/Vinegar Clean;Heat Disinfect;Exterior Machine Disinfection   Rinseback Volume (ml) 400 ml   Blood Volume Processed (Liters) 114.6 L   Dialyzer Clearance Lightly streaked   Duration of Treatment (minutes) 240 minutes   Heparin Amount Administered During Treatment (mL) 0 mL   Hemodialysis Intake (ml) 400 ml   Hemodialysis Output (ml) 3900 ml   NET Removed (ml) 3500     4 hour treatment completed. verapamil, clonidine administered during dialysis. 3.5L of fluid removed to get patient back to post tx weight. Pressure held to access for 10 mins x2. Dressing clean/dry and intact upon leaving the unit. Report given to primary RN. Charting printed and placed in bin to be scanned into EMR.

## 2024-03-02 NOTE — PLAN OF CARE
Problem: Discharge Planning  Goal: Discharge to home or other facility with appropriate resources  3/2/2024 0955 by Canelo Aguilera RN  Outcome: Progressing     Problem: Safety - Adult  Goal: Free from fall injury  3/2/2024 0955 by Canelo Aguilera RN  Outcome: Progressing     Problem: ABCDS Injury Assessment  Goal: Absence of physical injury  3/2/2024 0955 by Canelo Aguilera RN  Outcome: Progressing     Problem: Respiratory - Adult  Goal: Achieves optimal ventilation and oxygenation  3/2/2024 0955 by Canelo Aguilera RN  Outcome: Progressing

## 2024-03-02 NOTE — FLOWSHEET NOTE
Slip knot removed from lower site. No bleeding noted. Upper slip knot loosened and oozing from site noted. Slip knot tightened down and dressing applied.

## 2024-03-02 NOTE — PROGRESS NOTES
chronic chronic hypoxic respiratory failure.  -On 3 L of oxygen around-the-clock at home.  - will likely need repeat home oxygen evaluation    #Tobacco abuse.  -Cessation counseling; NicoDerm 14 mg topically daily         Subjective (past 24 hours):   Patient seen and examined at bed side.Denies any new complaints.No acute distress noted.      Past medical history, family history, social history and allergies reviewed again and is unchanged since admission.    ROS (All review of systems completed.  Pertinent positives noted. Otherwise All other systems reviewed and negative.)     Medications:  Reviewed    Infusion Medications    sodium chloride Stopped (03/01/24 1129)    sodium chloride       Scheduled Medications    hydrALAZINE  100 mg Oral 3 times per day    ceFAZolin  2,000 mg IntraVENous Once    HYDROmorphone  1 mg IntraVENous Once    lidocaine   Topical Once    midazolam  1 mg IntraVENous Once    cetirizine  10 mg Oral Daily    cefTRIAXone (ROCEPHIN) IV  1,000 mg IntraVENous Q24H    albuterol  2.5 mg Nebulization Once    aspirin  81 mg Oral Daily    atorvastatin  40 mg Oral Nightly    calcitRIOL  0.5 mcg Oral Once per day on Tue Thu Sat    cinacalcet  150 mg Oral Once per day on Tue Thu Sat    cloNIDine  0.3 mg Oral TID    famotidine  20 mg Oral Daily    ferrous sulfate  325 mg Oral TID WC    folic acid  1 mg Oral Daily    isosorbide mononitrate  120 mg Oral BID    sertraline  100 mg Oral Daily    verapamil  240 mg Oral Daily    sodium chloride flush  5-40 mL IntraVENous 2 times per day    [Held by provider] heparin (porcine)  5,000 Units SubCUTAneous 3 times per day    nicotine  1 patch TransDERmal Q24H     PRN Meds: Menthol, albuterol sulfate HFA, traZODone, sodium chloride flush, sodium chloride, ondansetron **OR** ondansetron, acetaminophen **OR** acetaminophen, hydrALAZINE      Intake/Output Summary (Last 24 hours) at 3/2/2024 1337  Last data filed at 3/2/2024 1232  Gross per 24 hour   Intake 1031.89 ml

## 2024-03-03 LAB
ANION GAP SERPL CALC-SCNC: 11 MEQ/L (ref 8–16)
ANION GAP SERPL CALC-SCNC: 9 MEQ/L (ref 8–16)
BUN SERPL-MCNC: 40 MG/DL (ref 7–22)
BUN SERPL-MCNC: 45 MG/DL (ref 7–22)
CALCIUM SERPL-MCNC: 7.2 MG/DL (ref 8.5–10.5)
CALCIUM SERPL-MCNC: 7.4 MG/DL (ref 8.5–10.5)
CHLORIDE SERPL-SCNC: 101 MEQ/L (ref 98–111)
CHLORIDE SERPL-SCNC: 104 MEQ/L (ref 98–111)
CO2 SERPL-SCNC: 25 MEQ/L (ref 23–33)
CO2 SERPL-SCNC: 25 MEQ/L (ref 23–33)
CREAT SERPL-MCNC: 6.2 MG/DL (ref 0.4–1.2)
CREAT SERPL-MCNC: 6.9 MG/DL (ref 0.4–1.2)
GFR SERPL CREATININE-BSD FRML MDRD: 10 ML/MIN/1.73M2
GFR SERPL CREATININE-BSD FRML MDRD: 9 ML/MIN/1.73M2
GLUCOSE SERPL-MCNC: 107 MG/DL (ref 70–108)
GLUCOSE SERPL-MCNC: 82 MG/DL (ref 70–108)
POTASSIUM SERPL-SCNC: 5.1 MEQ/L (ref 3.5–5.2)
POTASSIUM SERPL-SCNC: 5.4 MEQ/L (ref 3.5–5.2)
SODIUM SERPL-SCNC: 137 MEQ/L (ref 135–145)
SODIUM SERPL-SCNC: 138 MEQ/L (ref 135–145)

## 2024-03-03 PROCEDURE — 99232 SBSQ HOSP IP/OBS MODERATE 35: CPT | Performed by: INTERNAL MEDICINE

## 2024-03-03 PROCEDURE — 6370000000 HC RX 637 (ALT 250 FOR IP): Performed by: INTERNAL MEDICINE

## 2024-03-03 PROCEDURE — 94640 AIRWAY INHALATION TREATMENT: CPT

## 2024-03-03 PROCEDURE — 2580000003 HC RX 258: Performed by: INTERNAL MEDICINE

## 2024-03-03 PROCEDURE — 80048 BASIC METABOLIC PNL TOTAL CA: CPT

## 2024-03-03 PROCEDURE — 6370000000 HC RX 637 (ALT 250 FOR IP): Performed by: NURSE PRACTITIONER

## 2024-03-03 PROCEDURE — 1200000000 HC SEMI PRIVATE

## 2024-03-03 PROCEDURE — 2580000003 HC RX 258: Performed by: NURSE PRACTITIONER

## 2024-03-03 PROCEDURE — 36415 COLL VENOUS BLD VENIPUNCTURE: CPT

## 2024-03-03 PROCEDURE — 6360000002 HC RX W HCPCS: Performed by: INTERNAL MEDICINE

## 2024-03-03 RX ADMIN — CLONIDINE HYDROCHLORIDE 0.3 MG: 0.2 TABLET ORAL at 12:33

## 2024-03-03 RX ADMIN — ASPIRIN 81 MG: 81 TABLET, COATED ORAL at 08:33

## 2024-03-03 RX ADMIN — CETIRIZINE HYDROCHLORIDE 10 MG: 10 TABLET ORAL at 08:31

## 2024-03-03 RX ADMIN — SODIUM CHLORIDE, PRESERVATIVE FREE 10 ML: 5 INJECTION INTRAVENOUS at 19:31

## 2024-03-03 RX ADMIN — SODIUM ZIRCONIUM CYCLOSILICATE 10 G: 10 POWDER, FOR SUSPENSION ORAL at 10:14

## 2024-03-03 RX ADMIN — ALBUTEROL SULFATE 2 PUFF: 90 AEROSOL, METERED RESPIRATORY (INHALATION) at 08:33

## 2024-03-03 RX ADMIN — ISOSORBIDE MONONITRATE 120 MG: 60 TABLET, EXTENDED RELEASE ORAL at 08:31

## 2024-03-03 RX ADMIN — CEFTRIAXONE SODIUM 1000 MG: 1 INJECTION, POWDER, FOR SOLUTION INTRAMUSCULAR; INTRAVENOUS at 13:25

## 2024-03-03 RX ADMIN — HYDRALAZINE HYDROCHLORIDE 100 MG: 50 TABLET ORAL at 19:32

## 2024-03-03 RX ADMIN — FERROUS SULFATE TAB 325 MG (65 MG ELEMENTAL FE) 325 MG: 325 (65 FE) TAB at 16:35

## 2024-03-03 RX ADMIN — FOLIC ACID 1 MG: 1 TABLET ORAL at 08:31

## 2024-03-03 RX ADMIN — VERAPAMIL HYDROCHLORIDE 240 MG: 240 TABLET, FILM COATED, EXTENDED RELEASE ORAL at 08:31

## 2024-03-03 RX ADMIN — SODIUM CHLORIDE, PRESERVATIVE FREE 10 ML: 5 INJECTION INTRAVENOUS at 08:30

## 2024-03-03 RX ADMIN — FERROUS SULFATE TAB 325 MG (65 MG ELEMENTAL FE) 325 MG: 325 (65 FE) TAB at 08:31

## 2024-03-03 RX ADMIN — FERROUS SULFATE TAB 325 MG (65 MG ELEMENTAL FE) 325 MG: 325 (65 FE) TAB at 12:33

## 2024-03-03 RX ADMIN — FAMOTIDINE 20 MG: 20 TABLET, FILM COATED ORAL at 08:30

## 2024-03-03 RX ADMIN — HYDRALAZINE HYDROCHLORIDE 100 MG: 50 TABLET ORAL at 05:25

## 2024-03-03 RX ADMIN — ATORVASTATIN CALCIUM 40 MG: 40 TABLET, FILM COATED ORAL at 19:31

## 2024-03-03 RX ADMIN — ISOSORBIDE MONONITRATE 120 MG: 60 TABLET, EXTENDED RELEASE ORAL at 19:31

## 2024-03-03 RX ADMIN — HYDRALAZINE HYDROCHLORIDE 100 MG: 50 TABLET ORAL at 12:33

## 2024-03-03 RX ADMIN — CLONIDINE HYDROCHLORIDE 0.3 MG: 0.2 TABLET ORAL at 19:31

## 2024-03-03 RX ADMIN — ACETAMINOPHEN 650 MG: 325 TABLET ORAL at 12:37

## 2024-03-03 RX ADMIN — CLONIDINE HYDROCHLORIDE 0.3 MG: 0.2 TABLET ORAL at 08:31

## 2024-03-03 RX ADMIN — SERTRALINE 100 MG: 100 TABLET, FILM COATED ORAL at 08:31

## 2024-03-03 ASSESSMENT — PAIN DESCRIPTION - ORIENTATION
ORIENTATION: LEFT

## 2024-03-03 ASSESSMENT — PAIN DESCRIPTION - LOCATION
LOCATION: ARM

## 2024-03-03 ASSESSMENT — PAIN DESCRIPTION - ONSET
ONSET: ON-GOING
ONSET: ON-GOING

## 2024-03-03 ASSESSMENT — PAIN SCALES - GENERAL
PAINLEVEL_OUTOF10: 4
PAINLEVEL_OUTOF10: 3
PAINLEVEL_OUTOF10: 2
PAINLEVEL_OUTOF10: 0

## 2024-03-03 ASSESSMENT — PAIN DESCRIPTION - DESCRIPTORS
DESCRIPTORS: SORE

## 2024-03-03 ASSESSMENT — PAIN - FUNCTIONAL ASSESSMENT
PAIN_FUNCTIONAL_ASSESSMENT: ACTIVITIES ARE NOT PREVENTED

## 2024-03-03 ASSESSMENT — PAIN DESCRIPTION - PAIN TYPE
TYPE: ACUTE PAIN

## 2024-03-03 ASSESSMENT — PAIN DESCRIPTION - FREQUENCY
FREQUENCY: INTERMITTENT

## 2024-03-03 NOTE — PLAN OF CARE
Problem: Discharge Planning  Goal: Discharge to home or other facility with appropriate resources  3/2/2024 2014 by Jane Sanchez, RN  Outcome: Progressing  Flowsheets (Taken 3/2/2024 2014)  Discharge to home or other facility with appropriate resources:   Identify barriers to discharge with patient and caregiver   Arrange for needed discharge resources and transportation as appropriate  Note: Plans to go home at discharge.     Problem: Safety - Adult  Goal: Free from fall injury  3/2/2024 2014 by Jane Sanchez, RN  Outcome: Progressing  Note: No falls noted this shift. Continue falling star program. Bed alarm on, bed in low position. Call light and personal belongings in reach.  Patient uses call light appropriately.      Problem: Respiratory - Adult  Goal: Achieves optimal ventilation and oxygenation  3/2/2024 2014 by Jane Sanchez, RN  Outcome: Progressing  Flowsheets (Taken 3/2/2024 2014)  Achieves optimal ventilation and oxygenation:   Assess for changes in mentation and behavior   Assess for changes in respiratory status   Oxygen supplementation based on oxygen saturation or arterial blood gases   Position to facilitate oxygenation and minimize respiratory effort   Assess and instruct to report shortness of breath or any respiratory difficulty     Problem: Cardiovascular - Adult  Goal: Maintains optimal cardiac output and hemodynamic stability  Outcome: Progressing  Flowsheets (Taken 3/2/2024 2014)  Maintains optimal cardiac output and hemodynamic stability:   Assess for signs of decreased cardiac output   Administer fluid and/or volume expanders as ordered   Monitor blood pressure and heart rate     Problem: Infection - Adult  Goal: Absence of infection at discharge  Outcome: Progressing  Flowsheets (Taken 3/2/2024 2014)  Absence of infection at discharge:   Assess and monitor for signs and symptoms of infection   Monitor endotracheal (as able) and nasal secretions for changes in amount and color

## 2024-03-03 NOTE — PROGRESS NOTES
Hospitalist Progress Note      Patient:  Trav Jennings    Unit/Bed:6K-13/013-A  YOB: 1965  MRN: [id number]   Acct: [id number]   PCP: Radha Hou APRN - CNP  Date of Admission: 2/26/2024    Assessment/Plan:    # Volume Overload.  -patient told nephrology on 2/26 that he was noncompliant with his fluid intake over the weekend has been drinking a lot of protein shakes.  --BNP noted to be 37,826;  -HD as scheduled on 2/24, had HD on 2/26, 2/27,2/28,2/29 .  -Resolved.    #Hypertensive urgency  -likely from volume overload.  -Resolved    #Hyperkalemia-  -Lokelma, HD.  -K 6.3 Today.  -Nephrology is following.  -HD today.  -Patient had fistulogram with angioplasty yesterday for persistent hyperkalemia.  -K today 5.4  -Will give a dose of Lokelma.    #Possible pneumonia, bilateral lower lobes (POA).  -Possibly secondary to gram-negative bacilli--procalcitonin noted be 0.9,   -Continue with Rocephin.Completed course of Zithromax.  -Encourage incentive spirometry and Acapella    #ESRD on HD--on Tuesday/Thursday/Saturday.  -Patient to get fistulogram with angioplasty on 03/01/20234.  -Dialysis as per nephrology.     #Primary hypertension, uncontrolled.  -On Catapres, hydralazine, Imdur, verapamil.  -Hydralazine 10 mg IV push every 6 hours as needed systolic blood pressure greater than 170, monitor    #Small bilateral pleural effusions.  -Back on his baseline oxygen needs.    #Obstructive hypertrophic cardiomyopathy secondary to uncontrolled hypertension.  -ECHO from May 24, 2023 revealed an EF 65 to 70% with marked LVOT obstruction    #Diabetes mellitus type 2, controlled.  -A1c 5.5 on December 25, 2023.  -Continue with sl/sc coverage.Hypoglycemia protocol.    #Hyperlipidemia.  -On statin.    #Chronic AAA.  -Follows with vascular surgery in [place]    #History of cocaine use--denies recent use    #Secondary hyperparathyroidism--on  calcitriol    #Possible REZA.  -Sleep study  outpatient     #Acute on chronic chronic hypoxic respiratory failure.  -On 3 L of oxygen around-the-clock at home.  - will likely need repeat home oxygen evaluation    #Tobacco abuse.  -Cessation counseling; NicoDerm 14 mg topically daily         Subjective (past 24 hours):   Patient seen and examined at bed side.Says still coughs and feels his chest is congested.Bringing up whitish sputum.Denies any new complaints.No acute distress noted.      Past medical history, family history, social history and allergies reviewed again and is unchanged since admission.    ROS (All review of systems completed.  Pertinent positives noted. Otherwise All other systems reviewed and negative.)     Medications:  Reviewed    Infusion Medications    sodium chloride       Scheduled Medications    cefTRIAXone (ROCEPHIN) IV  1,000 mg IntraVENous Q24H    hydrALAZINE  100 mg Oral 3 times per day    ceFAZolin  2,000 mg IntraVENous Once    HYDROmorphone  1 mg IntraVENous Once    lidocaine   Topical Once    midazolam  1 mg IntraVENous Once    cetirizine  10 mg Oral Daily    albuterol  2.5 mg Nebulization Once    aspirin  81 mg Oral Daily    atorvastatin  40 mg Oral Nightly    calcitRIOL  0.5 mcg Oral Once per day on Tue Thu Sat    cinacalcet  150 mg Oral Once per day on Tue Thu Sat    cloNIDine  0.3 mg Oral TID    famotidine  20 mg Oral Daily    ferrous sulfate  325 mg Oral TID WC    folic acid  1 mg Oral Daily    isosorbide mononitrate  120 mg Oral BID    sertraline  100 mg Oral Daily    verapamil  240 mg Oral Daily    sodium chloride flush  5-40 mL IntraVENous 2 times per day    [Held by provider] heparin (porcine)  5,000 Units SubCUTAneous 3 times per day    nicotine  1 patch TransDERmal Q24H     PRN Meds: Menthol, albuterol sulfate HFA, traZODone, sodium chloride flush, sodium chloride, ondansetron **OR** ondansetron, acetaminophen **OR** acetaminophen, hydrALAZINE      Intake/Output Summary

## 2024-03-03 NOTE — PROGRESS NOTES
[facility]   Nephrology progress note  3/3/2024, 12:14 PM      Pt Name:    Trav Jennings  MRN:     [id number]     YOB: 1965  Admit Date:    2/26/2024 12:25 PM    Chief Complaint: Nephrology following for ESRD and hyperkalemia and fluid overload.    Subjective:  Patient seen and examined this morning.   Feels ok no copmlaints.  Objective:  24HR INTAKE/OUTPUT:    Intake/Output Summary (Last 24 hours) at 3/3/2024 1214  Last data filed at 3/2/2024 2210  Gross per 24 hour   Intake 1080 ml   Output 3900 ml   Net -2820 ml      Admission weight: 94.6 kg (208 lb 8.9 oz)  Wt Readings from Last 3 Encounters:   03/02/24 92.9 kg (204 lb 12.9 oz)   02/16/24 92.6 kg (204 lb 2.3 oz)   02/06/24 94.4 kg (208 lb 1.8 oz)        Vitals :   Vitals:    03/03/24 0409 03/03/24 0411 03/03/24 0815 03/03/24 1211   BP: (!) 139/94 (!) 148/76 (!) 167/74 (!) 141/72   Pulse:  81 87 78   Resp:  18 16 18   Temp:  98.2 °F (36.8 °C) 97.6 °F (36.4 °C) 97.9 °F (36.6 °C)   TempSrc:  Oral Oral Oral   SpO2:  100% 100% 100%   Weight:       Height:           Physical examination  General Appearance:  Well developed. No distress  Mouth/Throat:  Oral mucosa moist  Neck:  Supple, no JVD  Lungs:  Breath sounds: clear  Heart::  S1,S2 heard  Abdomen:  Soft, non - tender  Musculoskeletal: no leg edema    Medications:  Infusion:    sodium chloride       Meds:    cefTRIAXone (ROCEPHIN) IV  1,000 mg IntraVENous Q24H    hydrALAZINE  100 mg Oral 3 times per day    ceFAZolin  2,000 mg IntraVENous Once    HYDROmorphone  1 mg IntraVENous Once    lidocaine   Topical Once    midazolam  1 mg IntraVENous Once    cetirizine  10 mg Oral Daily    albuterol  2.5 mg Nebulization Once    aspirin  81 mg Oral Daily    atorvastatin  40 mg Oral Nightly    calcitRIOL  0.5 mcg Oral Once per day on Tue Thu Sat    cinacalcet  150 mg Oral Once per day on Tue Thu Sat    cloNIDine  0.3 mg Oral TID    famotidine  20 mg Oral Daily    ferrous sulfate  325 mg Oral  TID WC    folic acid  1 mg Oral Daily    isosorbide mononitrate  120 mg Oral BID    sertraline  100 mg Oral Daily    verapamil  240 mg Oral Daily    sodium chloride flush  5-40 mL IntraVENous 2 times per day    [Held by provider] heparin (porcine)  5,000 Units SubCUTAneous 3 times per day    nicotine  1 patch TransDERmal Q24H       Lab Data :  CBC:   Recent Labs     03/02/24  0456   WBC 4.4*   HGB 10.1*   HCT 34.0*   *     CMP:  Recent Labs     03/02/24  0456 03/02/24  1527 03/03/24  0550     --  138   K 6.3* 4.7 5.4*     --  104   CO2 23  --  25   BUN 48*  --  40*   CREATININE 6.9*  --  6.2*   GLUCOSE 94  --  107   CALCIUM 7.9*  --  7.2*     Hepatic:   No results for input(s): \"LABALBU\", \"AST\", \"ALT\", \"ALB\", \"BILITOT\", \"ALKPHOS\" in the last 72 hours.      Assessment and Plan:  Renal -ESRD on hemodialysis Tuesday Thursday Saturday  With persistent hyperkalemia so had fistulogram with angioplasty on Friday  K is 5.4  give lokelma. Might need to add Lokelma 10 grams on non dialysis days    Hyperkalemia: see above  Essential hypertension, hydralazine increased  Diabetes mellitus  Anemia of renal dysfunction  Meds reviewed and discussed with patient     Hermila Clayton,   [facility]    This report has been created using voice recognition software. It may contain minor errors which are inherent in voice recognition technology

## 2024-03-04 LAB
ANION GAP SERPL CALC-SCNC: 16 MEQ/L (ref 8–16)
BUN SERPL-MCNC: 63 MG/DL (ref 7–22)
CALCIUM SERPL-MCNC: 7.8 MG/DL (ref 8.5–10.5)
CHLORIDE SERPL-SCNC: 101 MEQ/L (ref 98–111)
CO2 SERPL-SCNC: 21 MEQ/L (ref 23–33)
CREAT SERPL-MCNC: 8.2 MG/DL (ref 0.4–1.2)
GFR SERPL CREATININE-BSD FRML MDRD: 7 ML/MIN/1.73M2
GLUCOSE SERPL-MCNC: 109 MG/DL (ref 70–108)
POTASSIUM SERPL-SCNC: 5.7 MEQ/L (ref 3.5–5.2)
SODIUM SERPL-SCNC: 138 MEQ/L (ref 135–145)

## 2024-03-04 PROCEDURE — 80048 BASIC METABOLIC PNL TOTAL CA: CPT

## 2024-03-04 PROCEDURE — 99232 SBSQ HOSP IP/OBS MODERATE 35: CPT | Performed by: INTERNAL MEDICINE

## 2024-03-04 PROCEDURE — 6370000000 HC RX 637 (ALT 250 FOR IP): Performed by: INTERNAL MEDICINE

## 2024-03-04 PROCEDURE — 2580000003 HC RX 258: Performed by: NURSE PRACTITIONER

## 2024-03-04 PROCEDURE — 1200000000 HC SEMI PRIVATE

## 2024-03-04 PROCEDURE — 2580000003 HC RX 258: Performed by: INTERNAL MEDICINE

## 2024-03-04 PROCEDURE — 6360000002 HC RX W HCPCS: Performed by: INTERNAL MEDICINE

## 2024-03-04 PROCEDURE — 36415 COLL VENOUS BLD VENIPUNCTURE: CPT

## 2024-03-04 PROCEDURE — 6370000000 HC RX 637 (ALT 250 FOR IP): Performed by: NURSE PRACTITIONER

## 2024-03-04 RX ADMIN — VERAPAMIL HYDROCHLORIDE 240 MG: 240 TABLET, FILM COATED, EXTENDED RELEASE ORAL at 07:46

## 2024-03-04 RX ADMIN — ISOSORBIDE MONONITRATE 120 MG: 60 TABLET, EXTENDED RELEASE ORAL at 07:46

## 2024-03-04 RX ADMIN — HYDRALAZINE HYDROCHLORIDE 100 MG: 50 TABLET ORAL at 13:53

## 2024-03-04 RX ADMIN — FERROUS SULFATE TAB 325 MG (65 MG ELEMENTAL FE) 325 MG: 325 (65 FE) TAB at 07:46

## 2024-03-04 RX ADMIN — SODIUM ZIRCONIUM CYCLOSILICATE 10 G: 10 POWDER, FOR SUSPENSION ORAL at 17:57

## 2024-03-04 RX ADMIN — ISOSORBIDE MONONITRATE 120 MG: 60 TABLET, EXTENDED RELEASE ORAL at 19:42

## 2024-03-04 RX ADMIN — ATORVASTATIN CALCIUM 40 MG: 40 TABLET, FILM COATED ORAL at 19:42

## 2024-03-04 RX ADMIN — SODIUM CHLORIDE, PRESERVATIVE FREE 10 ML: 5 INJECTION INTRAVENOUS at 19:42

## 2024-03-04 RX ADMIN — ASPIRIN 81 MG: 81 TABLET, COATED ORAL at 07:44

## 2024-03-04 RX ADMIN — CLONIDINE HYDROCHLORIDE 0.3 MG: 0.2 TABLET ORAL at 13:52

## 2024-03-04 RX ADMIN — SODIUM CHLORIDE, PRESERVATIVE FREE 10 ML: 5 INJECTION INTRAVENOUS at 07:46

## 2024-03-04 RX ADMIN — FAMOTIDINE 20 MG: 20 TABLET, FILM COATED ORAL at 07:46

## 2024-03-04 RX ADMIN — FERROUS SULFATE TAB 325 MG (65 MG ELEMENTAL FE) 325 MG: 325 (65 FE) TAB at 17:57

## 2024-03-04 RX ADMIN — SODIUM ZIRCONIUM CYCLOSILICATE 10 G: 10 POWDER, FOR SUSPENSION ORAL at 12:32

## 2024-03-04 RX ADMIN — CLONIDINE HYDROCHLORIDE 0.3 MG: 0.2 TABLET ORAL at 19:42

## 2024-03-04 RX ADMIN — CLONIDINE HYDROCHLORIDE 0.3 MG: 0.2 TABLET ORAL at 07:45

## 2024-03-04 RX ADMIN — SERTRALINE 100 MG: 100 TABLET, FILM COATED ORAL at 07:46

## 2024-03-04 RX ADMIN — HYDRALAZINE HYDROCHLORIDE 100 MG: 50 TABLET ORAL at 21:37

## 2024-03-04 RX ADMIN — CETIRIZINE HYDROCHLORIDE 10 MG: 10 TABLET ORAL at 07:45

## 2024-03-04 RX ADMIN — FOLIC ACID 1 MG: 1 TABLET ORAL at 07:46

## 2024-03-04 RX ADMIN — HYDRALAZINE HYDROCHLORIDE 100 MG: 50 TABLET ORAL at 05:50

## 2024-03-04 RX ADMIN — CEFTRIAXONE SODIUM 1000 MG: 1 INJECTION, POWDER, FOR SOLUTION INTRAMUSCULAR; INTRAVENOUS at 13:55

## 2024-03-04 RX ADMIN — FERROUS SULFATE TAB 325 MG (65 MG ELEMENTAL FE) 325 MG: 325 (65 FE) TAB at 12:33

## 2024-03-04 ASSESSMENT — PAIN DESCRIPTION - ORIENTATION: ORIENTATION: LEFT

## 2024-03-04 ASSESSMENT — PAIN - FUNCTIONAL ASSESSMENT: PAIN_FUNCTIONAL_ASSESSMENT: ACTIVITIES ARE NOT PREVENTED

## 2024-03-04 ASSESSMENT — PAIN SCALES - GENERAL: PAINLEVEL_OUTOF10: 2

## 2024-03-04 ASSESSMENT — PAIN DESCRIPTION - LOCATION: LOCATION: ARM

## 2024-03-04 ASSESSMENT — PAIN DESCRIPTION - DESCRIPTORS: DESCRIPTORS: ACHING

## 2024-03-04 NOTE — PROGRESS NOTES
calcitriol    #Possible REZA.  -Sleep study  outpatient     #Acute on chronic chronic hypoxic respiratory failure.  -On 3 L of oxygen around-the-clock at home.  - will likely need repeat home oxygen evaluation    #Tobacco abuse.  -Cessation counseling; NicoDerm 14 mg topically daily         Subjective (past 24 hours):   Patient seen and examined at bed side.Says still coughs and feels his chest is congested.Bringing up whitish sputum.Denies any new complaints.No acute distress noted.      Past medical history, family history, social history and allergies reviewed again and is unchanged since admission.    ROS (All review of systems completed.  Pertinent positives noted. Otherwise All other systems reviewed and negative.)     Medications:  Reviewed    Infusion Medications    sodium chloride       Scheduled Medications    sodium zirconium cyclosilicate  10 g Oral Q6H    cefTRIAXone (ROCEPHIN) IV  1,000 mg IntraVENous Q24H    hydrALAZINE  100 mg Oral 3 times per day    ceFAZolin  2,000 mg IntraVENous Once    HYDROmorphone  1 mg IntraVENous Once    lidocaine   Topical Once    midazolam  1 mg IntraVENous Once    cetirizine  10 mg Oral Daily    albuterol  2.5 mg Nebulization Once    aspirin  81 mg Oral Daily    atorvastatin  40 mg Oral Nightly    calcitRIOL  0.5 mcg Oral Once per day on Tue Thu Sat    cinacalcet  150 mg Oral Once per day on Tue Thu Sat    cloNIDine  0.3 mg Oral TID    famotidine  20 mg Oral Daily    ferrous sulfate  325 mg Oral TID WC    folic acid  1 mg Oral Daily    isosorbide mononitrate  120 mg Oral BID    sertraline  100 mg Oral Daily    verapamil  240 mg Oral Daily    sodium chloride flush  5-40 mL IntraVENous 2 times per day    [Held by provider] heparin (porcine)  5,000 Units SubCUTAneous 3 times per day    nicotine  1 patch TransDERmal Q24H     PRN Meds: Menthol, albuterol sulfate HFA, traZODone, sodium chloride flush, sodium chloride, ondansetron **OR** ondansetron, acetaminophen **OR**  CHEST PORTABLE CLINICAL INFORMATION: Shortness of breath TECHNIQUE: Mobile AP chest radiographs. COMPARISON: Mobile AP chest radiographs 2/16/2024 FINDINGS: There is moderate stable enlargement of the cardiac silhouette. An endovascular stent is present in the thoracic aorta. There are stable widening of the mediastinum. The bilateral costophrenic angles are blunted. There are hazy and reticular opacities at the bilateral mid and lower lungs. Degenerative changes in the thoracic spine are poorly visualized.     1. Small bilateral pleural effusions with adjacent atelectasis/infiltrate. 2. Moderate stable cardiomegaly. **This report has been created using voice recognition software. It may contain minor errors which are inherent in voice recognition technology.** Final report electronically signed by Dr. Ger Juan on 2/26/2024 1:05 PM      Electronically signed by Ely Preston MD on 3/4/2024 at 11:23 AM

## 2024-03-04 NOTE — CARE COORDINATION
3/4/24, 2:34 PM EST    DISCHARGE PLANNING EVALUATION       SW received a call from Travis at [facility] [phone] and she stated that she was unable to get transportation arranged from their area and was wondering if MICHAEL could assists.     MICHAEL spoke with Patient about transportation and he contacted [facility] and arranged transportation to [facility] on Wednesday at 9am.  MICHAEL verified and they will meet Patient at discharge doors.

## 2024-03-04 NOTE — PROGRESS NOTES
[facility]   Nephrology progress note  3/4/2024, 11:13 AM      Pt Name:    Trav Jennings  MRN:     [id number]     YOB: 1965  Admit Date:    2/26/2024 12:25 PM    Chief Complaint: Nephrology following for ESRD and hyperkalemia    Subjective:  Patient seen and examined  No chest pain or shortness of breath  Continues to be on 1 L of nasal cannula    Objective:  24HR INTAKE/OUTPUT:    Intake/Output Summary (Last 24 hours) at 3/4/2024 1113  Last data filed at 3/4/2024 0730  Gross per 24 hour   Intake 810 ml   Output --   Net 810 ml        Admission weight: 94.6 kg (208 lb 8.9 oz)  Wt Readings from Last 3 Encounters:   03/02/24 92.9 kg (204 lb 12.9 oz)   02/16/24 92.6 kg (204 lb 2.3 oz)   02/06/24 94.4 kg (208 lb 1.8 oz)        Vitals :   Vitals:    03/03/24 2309 03/04/24 0340 03/04/24 0550 03/04/24 0730   BP: (!) 159/71 (!) 160/80 (!) 160/80 (!) 186/78   Pulse: 80 78  94   Resp: 18 18  18   Temp: 97.5 °F (36.4 °C) 97.9 °F (36.6 °C)  98.1 °F (36.7 °C)   TempSrc: Oral Oral  Oral   SpO2: 100% 100%  96%   Weight:       Height:           Physical examination  General Appearance:  Well developed. No distress  Mouth/Throat:  Oral mucosa moist  Neck:  Supple, no JVD  Lungs:  Breath sounds: clear  Heart::  S1,S2 heard  Abdomen:  Soft, non - tender  Musculoskeletal:  Edema -resolved    Medications:  Infusion:    sodium chloride       Meds:    cefTRIAXone (ROCEPHIN) IV  1,000 mg IntraVENous Q24H    hydrALAZINE  100 mg Oral 3 times per day    ceFAZolin  2,000 mg IntraVENous Once    HYDROmorphone  1 mg IntraVENous Once    lidocaine   Topical Once    midazolam  1 mg IntraVENous Once    cetirizine  10 mg Oral Daily    albuterol  2.5 mg Nebulization Once    aspirin  81 mg Oral Daily    atorvastatin  40 mg Oral Nightly    calcitRIOL  0.5 mcg Oral Once per day on Tue Thu Sat    cinacalcet  150 mg Oral Once per day on Tue Thu Sat    cloNIDine  0.3 mg Oral TID    famotidine  20 mg Oral Daily    ferrous

## 2024-03-04 NOTE — PLAN OF CARE
Problem: Discharge Planning  Goal: Discharge to home or other facility with appropriate resources  Outcome: Progressing  Flowsheets (Taken 3/3/2024 0815 by Jyoti Pedroza RN)  Discharge to home or other facility with appropriate resources: Identify barriers to discharge with patient and caregiver     Problem: Safety - Adult  Goal: Free from fall injury  Outcome: Progressing  Flowsheets (Taken 3/1/2024 2041 by Jane Sanchez, RN)  Free From Fall Injury: Instruct family/caregiver on patient safety     Problem: ABCDS Injury Assessment  Goal: Absence of physical injury  Outcome: Progressing  Flowsheets (Taken 3/1/2024 2041 by Jane Sanchez, RN)  Absence of Physical Injury: Implement safety measures based on patient assessment     Problem: Respiratory - Adult  Goal: Achieves optimal ventilation and oxygenation  Outcome: Progressing  Flowsheets (Taken 3/3/2024 0815 by Jyoti Pedroza RN)  Achieves optimal ventilation and oxygenation: Assess for changes in respiratory status     Problem: Cardiovascular - Adult  Goal: Maintains optimal cardiac output and hemodynamic stability  Outcome: Progressing  Flowsheets (Taken 3/3/2024 0815 by Jyoti Pedroza RN)  Maintains optimal cardiac output and hemodynamic stability: Monitor blood pressure and heart rate  Goal: Absence of cardiac dysrhythmias or at baseline  Outcome: Progressing  Flowsheets (Taken 3/3/2024 0815 by Jyoti Pedroza RN)  Absence of cardiac dysrhythmias or at baseline: Monitor cardiac rate and rhythm     Problem: Skin/Tissue Integrity - Adult  Goal: Skin integrity remains intact  Outcome: Progressing  Flowsheets  Taken 3/3/2024 2120 by Misti Srivastava RN  Skin Integrity Remains Intact: Monitor for areas of redness and/or skin breakdown  Taken 3/3/2024 0815 by Jyoti Pedroza RN  Skin Integrity Remains Intact: Monitor for areas of redness and/or skin breakdown  Goal: Incisions, wounds, or drain sites healing without S/S of infection  Outcome:  Progressing  Flowsheets (Taken 3/3/2024 0815 by Jyoti Pedroza, RN)  Incisions, Wounds, or Drain Sites Healing Without Sign and Symptoms of Infection: ADMISSION and DAILY: Assess and document risk factors for pressure ulcer development

## 2024-03-04 NOTE — PROGRESS NOTES
03/04/24 1355   Encounter Summary   Encounter Overview/Reason  Spiritual/Emotional Needs   Service Provided For: Patient   Referral/Consult From: [facility]   Support System Family members   Last Encounter  03/04/24   Complexity of Encounter Low   Begin Time 1347   End Time  1355   Total Time Calculated 8 min   Spiritual/Emotional needs   Type Spiritual Support   Assessment/Intervention/Outcome   Assessment Hopeful   Intervention Nurtured Hope   Outcome Coping     Assessment:  In my encounter with the 59 yr old patient, while rounding  the unit 6K,  I provided spiritual care to patient through conversation, I also came to assess the patient's spiritual needs present. The pt was admitted due to hyperkalemia.     The pt is a  and was seeking assistance fron  's Administration.     Interventions:  I provided prayer, emotional support and words of comfort.  provided a listening presence and encouraged pt to share their beliefs and how they support them during their hospitalization.     Outcomes:  The patient was encouraged and didn't share any further spiritual needs at this time.     Plan:  Chaplains will follow-up at a later time for assessment of any spiritual care needs present.

## 2024-03-05 PROBLEM — E44.1 MILD MALNUTRITION (HCC): Status: ACTIVE | Noted: 2024-03-05

## 2024-03-05 LAB
ANION GAP SERPL CALC-SCNC: 11 MEQ/L (ref 8–16)
BUN SERPL-MCNC: 85 MG/DL (ref 7–22)
CALCIUM SERPL-MCNC: 8.1 MG/DL (ref 8.5–10.5)
CHLORIDE SERPL-SCNC: 101 MEQ/L (ref 98–111)
CO2 SERPL-SCNC: 24 MEQ/L (ref 23–33)
CREAT SERPL-MCNC: 10.5 MG/DL (ref 0.4–1.2)
GFR SERPL CREATININE-BSD FRML MDRD: 5 ML/MIN/1.73M2
GLUCOSE SERPL-MCNC: 72 MG/DL (ref 70–108)
POTASSIUM SERPL-SCNC: 6.8 MEQ/L (ref 3.5–5.2)
SODIUM SERPL-SCNC: 136 MEQ/L (ref 135–145)

## 2024-03-05 PROCEDURE — 36415 COLL VENOUS BLD VENIPUNCTURE: CPT

## 2024-03-05 PROCEDURE — 1200000000 HC SEMI PRIVATE

## 2024-03-05 PROCEDURE — 80048 BASIC METABOLIC PNL TOTAL CA: CPT

## 2024-03-05 PROCEDURE — 99232 SBSQ HOSP IP/OBS MODERATE 35: CPT | Performed by: INTERNAL MEDICINE

## 2024-03-05 PROCEDURE — 90935 HEMODIALYSIS ONE EVALUATION: CPT | Performed by: INTERNAL MEDICINE

## 2024-03-05 PROCEDURE — 6370000000 HC RX 637 (ALT 250 FOR IP): Performed by: NURSE PRACTITIONER

## 2024-03-05 PROCEDURE — 6370000000 HC RX 637 (ALT 250 FOR IP): Performed by: INTERNAL MEDICINE

## 2024-03-05 PROCEDURE — 2580000003 HC RX 258: Performed by: NURSE PRACTITIONER

## 2024-03-05 PROCEDURE — 6360000002 HC RX W HCPCS: Performed by: INTERNAL MEDICINE

## 2024-03-05 PROCEDURE — 2580000003 HC RX 258: Performed by: INTERNAL MEDICINE

## 2024-03-05 RX ADMIN — FOLIC ACID 1 MG: 1 TABLET ORAL at 14:22

## 2024-03-05 RX ADMIN — ATORVASTATIN CALCIUM 40 MG: 40 TABLET, FILM COATED ORAL at 19:30

## 2024-03-05 RX ADMIN — CETIRIZINE HYDROCHLORIDE 10 MG: 10 TABLET ORAL at 14:23

## 2024-03-05 RX ADMIN — CLONIDINE HYDROCHLORIDE 0.3 MG: 0.2 TABLET ORAL at 14:22

## 2024-03-05 RX ADMIN — HYDRALAZINE HYDROCHLORIDE 100 MG: 50 TABLET ORAL at 14:23

## 2024-03-05 RX ADMIN — ISOSORBIDE MONONITRATE 120 MG: 60 TABLET, EXTENDED RELEASE ORAL at 14:23

## 2024-03-05 RX ADMIN — ISOSORBIDE MONONITRATE 120 MG: 60 TABLET, EXTENDED RELEASE ORAL at 19:30

## 2024-03-05 RX ADMIN — CALCITRIOL CAPSULES 0.25 MCG 0.5 MCG: 0.25 CAPSULE ORAL at 14:22

## 2024-03-05 RX ADMIN — FERROUS SULFATE TAB 325 MG (65 MG ELEMENTAL FE) 325 MG: 325 (65 FE) TAB at 17:04

## 2024-03-05 RX ADMIN — CLONIDINE HYDROCHLORIDE 0.3 MG: 0.2 TABLET ORAL at 19:30

## 2024-03-05 RX ADMIN — SERTRALINE 100 MG: 100 TABLET, FILM COATED ORAL at 14:22

## 2024-03-05 RX ADMIN — FERROUS SULFATE TAB 325 MG (65 MG ELEMENTAL FE) 325 MG: 325 (65 FE) TAB at 14:22

## 2024-03-05 RX ADMIN — CINACALCET HYDROCHLORIDE 150 MG: 30 TABLET, FILM COATED ORAL at 14:23

## 2024-03-05 RX ADMIN — VERAPAMIL HYDROCHLORIDE 240 MG: 240 TABLET, FILM COATED, EXTENDED RELEASE ORAL at 14:23

## 2024-03-05 RX ADMIN — HYDRALAZINE HYDROCHLORIDE 100 MG: 50 TABLET ORAL at 04:30

## 2024-03-05 RX ADMIN — SODIUM CHLORIDE, PRESERVATIVE FREE 10 ML: 5 INJECTION INTRAVENOUS at 19:30

## 2024-03-05 RX ADMIN — FAMOTIDINE 20 MG: 20 TABLET, FILM COATED ORAL at 14:23

## 2024-03-05 RX ADMIN — CEFTRIAXONE SODIUM 1000 MG: 1 INJECTION, POWDER, FOR SOLUTION INTRAMUSCULAR; INTRAVENOUS at 14:28

## 2024-03-05 RX ADMIN — HYDRALAZINE HYDROCHLORIDE 100 MG: 50 TABLET ORAL at 21:17

## 2024-03-05 RX ADMIN — SODIUM CHLORIDE, PRESERVATIVE FREE 10 ML: 5 INJECTION INTRAVENOUS at 14:30

## 2024-03-05 RX ADMIN — ASPIRIN 81 MG: 81 TABLET, COATED ORAL at 14:23

## 2024-03-05 NOTE — PROGRESS NOTES
Hospitalist Progress Note      Patient:  Trav Jennings    Unit/Bed:6K-13/013-A  YOB: 1965  MRN: [id number]   Acct: [id number]   PCP: Radha Hou APRN - CNP  Date of Admission: 2/26/2024    Assessment/Plan:    # Volume Overload.  -patient told nephrology on 2/26 that he was noncompliant with his fluid intake over the weekend has been drinking a lot of protein shakes.  --BNP noted to be 37,826;  -HD as scheduled on 2/24, had HD on 2/26, 2/27,2/28,2/29 .  -Resolved.    #Hypertensive urgency  -likely from volume overload.  -Resolved    #Hyperkalemia-  -Lokelma, HD.  -K 6.8 Today.  -Nephrology is following.  -HD today.  -Patient had fistulogram with angioplasty  for persistent hyperkalemia.    #Possible pneumonia, bilateral lower lobes (POA).  -Possibly secondary to gram-negative bacilli--procalcitonin noted be 0.9,   -Continue with Rocephin one more day.Completed course of Zithromax.  -Encourage incentive spirometry and Acapella    #ESRD on HD--on Tuesday/Thursday/Saturday.  -Patient to get fistulogram with angioplasty on 03/01/20234.  -Dialysis as per nephrology.     #Primary hypertension, uncontrolled.  -On Catapres, hydralazine, Imdur, verapamil.  -Hydralazine 10 mg IV push every 6 hours as needed systolic blood pressure greater than 170, monitor    #Small bilateral pleural effusions.  -Back on his baseline oxygen needs.    #Obstructive hypertrophic cardiomyopathy secondary to uncontrolled hypertension.  -ECHO from May 24, 2023 revealed an EF 65 to 70% with marked LVOT obstruction    #Diabetes mellitus type 2, controlled.  -A1c 5.5 on December 25, 2023.  -Continue with sl/sc coverage.Hypoglycemia protocol.    #Hyperlipidemia.  -On statin.    #Chronic AAA.  -Follows with vascular surgery in [place]    #History of cocaine use--denies recent use    #Secondary hyperparathyroidism--on calcitriol    #Possible REZA.  -Sleep study   CLINICAL INFORMATION: Shortness of breath TECHNIQUE: Mobile AP chest radiographs. COMPARISON: Mobile AP chest radiographs 2/16/2024 FINDINGS: There is moderate stable enlargement of the cardiac silhouette. An endovascular stent is present in the thoracic aorta. There are stable widening of the mediastinum. The bilateral costophrenic angles are blunted. There are hazy and reticular opacities at the bilateral mid and lower lungs. Degenerative changes in the thoracic spine are poorly visualized.     1. Small bilateral pleural effusions with adjacent atelectasis/infiltrate. 2. Moderate stable cardiomegaly. **This report has been created using voice recognition software. It may contain minor errors which are inherent in voice recognition technology.** Final report electronically signed by Dr. Ger Juan on 2/26/2024 1:05 PM      Electronically signed by Ely Preston MD on 3/5/2024 at 2:09 PM

## 2024-03-05 NOTE — PROGRESS NOTES
Comprehensive Nutrition Assessment    Type and Reason for Visit:  Initial, RD Nutrition Re-Screen/LOS    Nutrition Recommendations/Plan:   Consider renal MVI, folbee plus.  ONS initiated: Nepro daily.  Continue current diet.      Malnutrition Assessment:  Malnutrition Status:  Mild malnutrition (03/05/24 1118)    Context:  Social/Environmental Circumstances  lack of food availability   Findings of the 6 clinical characteristics of malnutrition:  Energy Intake:  50% or less estimated energy requirements for 1 month or longer (1 meal a day for the last 2 months)  Weight Loss:  Unable to assess (receives hemodialysis, fluid/edema shifts)     Body Fat Loss:  No significant body fat loss     Muscle Mass Loss:  No significant muscle mass loss    Fluid Accumulation:  Mild Extremities   Strength:  Not Performed    Nutrition Assessment:     Pt. mildy malnourished AEB criteria as listed above.  At risk for further nutrition compromise r/t increased nutrient needs to support known losses with dialysis, admit with volume overload, hyperkalemia, HTN urgency,  and underlying medical condition (hx: cocaine abuse, HTN, secondary hypothyroidism, PTSD, depression, FSGS, DM, AAA, HD, HLD, noncompliant, pneumonia).        Nutrition Related Findings:    Pt. Report/Treatments/Miscellaneous: Patient seen in dialysis, reports poor eating for 2-3 months, 1 meal a day due to lack of food, \"I am going to [facility] tomorrow morning and they are going to help with all that\".  Reports he is eating much better here.  Agreeable to ONS daily.  GI Status: BM 3/4/24  Pertinent Labs: 12/25/23: HgbA1c 5.5%.  3/5/24: Sodium 136, Potassium 6.8, BUN 85, Creatinine 10.5, Glucose 72  Pertinent Meds: lipitor, rocaltrol, rocephin, sensipar, pepcid, iron, folvite, dilaudid, zoloft     Wound Type: None       Current Nutrition Intake & Therapies:    Average Meal Intake: 1-25%, 26-50%, %     ADULT DIET; Regular; Low Potassium (Less than 3000

## 2024-03-05 NOTE — FLOWSHEET NOTE
Stable 4.5 hour TX completed. Removed 5 L of fluid. pt tolerated fluid removal well. Pressure held to each needle sites x 10 min. Drsg clean, dry, and intact upon leaving unit. Report given to primary RN. pt requesting BP meds be given post dialysis due to 5L of fluid removal during TX. TX record printed for scanning into EMR.    03/05/24 0815 03/05/24 1320   Vital Signs   BP (!) 186/84 (!) 196/90   Temp 97.5 °F (36.4 °C) 97.4 °F (36.3 °C)   Pulse 91 83   Respirations 19 18   SpO2 97 % 100 %   Weight - Scale 101.3 kg (223 lb 5.2 oz) 95.7 kg (210 lb 15.7 oz)   Weight Method Standing scale Standing scale   Percent Weight Change 0.5 -5.06   Post-Hemodialysis Assessment   Post-Treatment Procedures  --  Blood returned;Access bleeding time < 10 minutes   Machine Disinfection Process  --  Heat Disinfect;Acid/Vinegar Clean;Exterior Machine Disinfection   Blood Volume Processed (Liters)  --  123.5 L   Dialyzer Clearance  --  Lightly streaked   Duration of Treatment (minutes)  --  270 minutes   Heparin Amount Administered During Treatment (mL)  --  0 mL   Hemodialysis Intake (ml)  --  400 ml   Hemodialysis Output (ml)  --  5400 ml   NET Removed (ml)  --  5000   Tolerated Treatment  --  Good

## 2024-03-05 NOTE — PROGRESS NOTES
mg Oral Daily    isosorbide mononitrate  120 mg Oral BID    sertraline  100 mg Oral Daily    verapamil  240 mg Oral Daily    sodium chloride flush  5-40 mL IntraVENous 2 times per day    [Held by provider] heparin (porcine)  5,000 Units SubCUTAneous 3 times per day    nicotine  1 patch TransDERmal Q24H       Lab Data :  CBC:   No results for input(s): \"WBC\", \"HGB\", \"HCT\", \"PLT\" in the last 72 hours.    CMP:  Recent Labs     03/03/24  1330 03/04/24  0740 03/05/24  0612    138 136   K 5.1 5.7* 6.8*    101 101   CO2 25 21* 24   BUN 45* 63* 85*   CREATININE 6.9* 8.2* 10.5*   GLUCOSE 82 109* 72   CALCIUM 7.4* 7.8* 8.1*       Assessment and Plan:  Renal -ESRD on hemodialysis Tuesday Thursday Saturday  Volume status stable potassium continues to be high 86.8 today  Seen during dialysis tolerating well blood pressure 160 UF 5400 mL blood flow rate of 500 venous pressure of 160  Status post fistulogram 3/1/2024 with angioplasty  Electrolytes -potassium continues to trend higher.  HD on a 2K bath and increase the treatment time to 4-1/2 hours  Essential hypertension doing much better  Diabetes mellitus  Anemia of renal dysfunction  Meds reviewed and discussed with patient     Ottoniel Pruett MD  [facility]    This report has been created using voice recognition software. It may contain minor errors which are inherent in voice recognition technology

## 2024-03-05 NOTE — CARE COORDINATION
3/5/24, 2:52 PM EST    DISCHARGE ON GOING EVALUATION    Trav Jennings       Hospital day: 8  Location: [facility]13/013-A Reason for admit: Hyperkalemia [E87.5]  Elevated troponin [R79.89]  Elevated brain natriuretic peptide (BNP) level [R79.89]  Hypervolemia, unspecified hypervolemia type [E87.70]  Acute on chronic congestive heart failure, unspecified heart failure type (HCC) [I50.9]   Procedure:  2/26 cxray: Small bilateral pleural effusions with adjacent atelectasis/infiltrate    3/1 fistulagram with angioplasty  Barriers to Discharge: K+6.8, creat 10.5. 4.5 hr HD treatment today with 2K bath.   PCP: Radha Hou APRN - CNP  Readmission Risk Score: 26.6%  Patient Goals/Plan/Treatment Preferences: Home with wife vs [facility] alcohol rehab center.

## 2024-03-05 NOTE — PLAN OF CARE
Problem: Discharge Planning  Goal: Discharge to home or other facility with appropriate resources  Outcome: Progressing  Flowsheets (Taken 3/5/2024 0114)  Discharge to home or other facility with appropriate resources:   Identify barriers to discharge with patient and caregiver   Arrange for needed discharge resources and transportation as appropriate     Problem: Safety - Adult  Goal: Free from fall injury  Outcome: Progressing  Note: No falls noted this shift. Continue falling star program. Bed alarm on, bed in low position. Call light and personal belongings in reach.  Patient uses call light appropriately.      Problem: ABCDS Injury Assessment  Goal: Absence of physical injury  Outcome: Progressing  Flowsheets (Taken 3/5/2024 0114)  Absence of Physical Injury: Implement safety measures based on patient assessment     Problem: Respiratory - Adult  Goal: Achieves optimal ventilation and oxygenation  Outcome: Progressing  Flowsheets (Taken 3/5/2024 0114)  Achieves optimal ventilation and oxygenation:   Assess for changes in respiratory status   Assess for changes in mentation and behavior   Oxygen supplementation based on oxygen saturation or arterial blood gases   Assess and instruct to report shortness of breath or any respiratory difficulty     Problem: Cardiovascular - Adult  Goal: Maintains optimal cardiac output and hemodynamic stability  Outcome: Progressing  Flowsheets (Taken 3/5/2024 0114)  Maintains optimal cardiac output and hemodynamic stability:   Monitor blood pressure and heart rate   Monitor urine output and notify Licensed Independent Practitioner for values outside of normal range     Problem: Cardiovascular - Adult  Goal: Absence of cardiac dysrhythmias or at baseline  Outcome: Progressing  Flowsheets (Taken 3/5/2024 0114)  Absence of cardiac dysrhythmias or at baseline:   Monitor cardiac rate and rhythm   Assess for signs of decreased cardiac output   Administer antiarrhythmia medication and  electrolyte replacement as ordered     Problem: Skin/Tissue Integrity - Adult  Goal: Skin integrity remains intact  Outcome: Progressing  Flowsheets (Taken 3/5/2024 0114)  Skin Integrity Remains Intact: Monitor for areas of redness and/or skin breakdown     Problem: Infection - Adult  Goal: Absence of infection at discharge  Outcome: Progressing  Flowsheets (Taken 3/5/2024 0114)  Absence of infection at discharge:   Assess and monitor for signs and symptoms of infection   Monitor lab/diagnostic results   Administer medications as ordered     Problem: Metabolic/Fluid and Electrolytes - Adult  Goal: Electrolytes maintained within normal limits  Outcome: Progressing  Flowsheets (Taken 3/5/2024 0114)  Electrolytes maintained within normal limits:   Monitor labs and assess patient for signs and symptoms of electrolyte imbalances   Administer electrolyte replacement as ordered   Monitor response to electrolyte replacements, including repeat lab results as appropriate   Fluid restriction as ordered     Problem: Chronic Conditions and Co-morbidities  Goal: Patient's chronic conditions and co-morbidity symptoms are monitored and maintained or improved  Outcome: Progressing  Flowsheets (Taken 3/5/2024 0114)  Care Plan - Patient's Chronic Conditions and Co-Morbidity Symptoms are Monitored and Maintained or Improved: Monitor and assess patient's chronic conditions and comorbid symptoms for stability, deterioration, or improvement     Problem: Pain  Goal: Verbalizes/displays adequate comfort level or baseline comfort level  Outcome: Progressing  Flowsheets (Taken 3/5/2024 0114)  Verbalizes/displays adequate comfort level or baseline comfort level:   Encourage patient to monitor pain and request assistance   Assess pain using appropriate pain scale    Care plan reviewed with patient.  Patient verbalized understanding of the plan of care and contribute to goal setting.

## 2024-03-05 NOTE — CARE COORDINATION
3/5/24, 1:28 PM EST    DISCHARGE PLANNING EVALUATION       SW received call from Travis at [facility] and a physician. They have reviewed case and they are not able to accept Patient at this time due to his medical complexity.  They both indicated that [facility] would [facility] and would reach out to explain things to him as well.     SW updated Patient on [facility] not being able to accept at this time and Patient was aware and has been on the phone with Alen  and they are recommending a peer to peer with physicians.     Peer to Peer completed by Dr. Pruett with Dr. Coronado ([phone] or [phone] ext [phone]).     [facility] is reviewing case for admission.     MICHAEL spoke with Edu from [facility] and Patient has an independence unit that he is able to take with him as long as he does not need over 3 liters.  Patient will need a new home oxygen evaluation and orders at discharge. This is needed for [facility].      MICHAEL updated Jet from [facility] and she is aware of Patient being weaned from oxygen today and that his oxygen unit is able to go with him and that no other oxygen machines or orders will be needed for his rehab stay in [place].

## 2024-03-06 LAB
ANION GAP SERPL CALC-SCNC: 15 MEQ/L (ref 8–16)
BUN SERPL-MCNC: 54 MG/DL (ref 7–22)
CALCIUM SERPL-MCNC: 7.3 MG/DL (ref 8.5–10.5)
CHLORIDE SERPL-SCNC: 102 MEQ/L (ref 98–111)
CO2 SERPL-SCNC: 23 MEQ/L (ref 23–33)
CREAT SERPL-MCNC: 7.3 MG/DL (ref 0.4–1.2)
GFR SERPL CREATININE-BSD FRML MDRD: 8 ML/MIN/1.73M2
GLUCOSE SERPL-MCNC: 87 MG/DL (ref 70–108)
POTASSIUM SERPL-SCNC: 5.6 MEQ/L (ref 3.5–5.2)
SODIUM SERPL-SCNC: 140 MEQ/L (ref 135–145)

## 2024-03-06 PROCEDURE — 80048 BASIC METABOLIC PNL TOTAL CA: CPT

## 2024-03-06 PROCEDURE — 6360000002 HC RX W HCPCS: Performed by: NURSE PRACTITIONER

## 2024-03-06 PROCEDURE — 6360000002 HC RX W HCPCS: Performed by: INTERNAL MEDICINE

## 2024-03-06 PROCEDURE — 99232 SBSQ HOSP IP/OBS MODERATE 35: CPT | Performed by: INTERNAL MEDICINE

## 2024-03-06 PROCEDURE — 6370000000 HC RX 637 (ALT 250 FOR IP): Performed by: INTERNAL MEDICINE

## 2024-03-06 PROCEDURE — 36415 COLL VENOUS BLD VENIPUNCTURE: CPT

## 2024-03-06 PROCEDURE — 94761 N-INVAS EAR/PLS OXIMETRY MLT: CPT

## 2024-03-06 PROCEDURE — 6370000000 HC RX 637 (ALT 250 FOR IP): Performed by: NURSE PRACTITIONER

## 2024-03-06 PROCEDURE — 94640 AIRWAY INHALATION TREATMENT: CPT

## 2024-03-06 PROCEDURE — 2580000003 HC RX 258: Performed by: INTERNAL MEDICINE

## 2024-03-06 PROCEDURE — 1200000000 HC SEMI PRIVATE

## 2024-03-06 PROCEDURE — 2580000003 HC RX 258: Performed by: NURSE PRACTITIONER

## 2024-03-06 RX ORDER — DOXAZOSIN MESYLATE 4 MG/1
4 TABLET ORAL NIGHTLY
Status: DISCONTINUED | OUTPATIENT
Start: 2024-03-06 | End: 2024-03-08 | Stop reason: HOSPADM

## 2024-03-06 RX ADMIN — SODIUM CHLORIDE, PRESERVATIVE FREE 10 ML: 5 INJECTION INTRAVENOUS at 21:18

## 2024-03-06 RX ADMIN — SERTRALINE 100 MG: 100 TABLET, FILM COATED ORAL at 09:14

## 2024-03-06 RX ADMIN — HYDRALAZINE HYDROCHLORIDE 10 MG: 20 INJECTION, SOLUTION INTRAMUSCULAR; INTRAVENOUS at 09:14

## 2024-03-06 RX ADMIN — CLONIDINE HYDROCHLORIDE 0.3 MG: 0.2 TABLET ORAL at 09:13

## 2024-03-06 RX ADMIN — DOXAZOSIN 4 MG: 4 TABLET ORAL at 21:18

## 2024-03-06 RX ADMIN — SODIUM ZIRCONIUM CYCLOSILICATE 10 G: 10 POWDER, FOR SUSPENSION ORAL at 12:57

## 2024-03-06 RX ADMIN — FERROUS SULFATE TAB 325 MG (65 MG ELEMENTAL FE) 325 MG: 325 (65 FE) TAB at 17:33

## 2024-03-06 RX ADMIN — FERROUS SULFATE TAB 325 MG (65 MG ELEMENTAL FE) 325 MG: 325 (65 FE) TAB at 12:57

## 2024-03-06 RX ADMIN — FOLIC ACID 1 MG: 1 TABLET ORAL at 09:14

## 2024-03-06 RX ADMIN — ALBUTEROL SULFATE 2 PUFF: 90 AEROSOL, METERED RESPIRATORY (INHALATION) at 09:17

## 2024-03-06 RX ADMIN — ATORVASTATIN CALCIUM 40 MG: 40 TABLET, FILM COATED ORAL at 21:19

## 2024-03-06 RX ADMIN — ASPIRIN 81 MG: 81 TABLET, COATED ORAL at 09:13

## 2024-03-06 RX ADMIN — ISOSORBIDE MONONITRATE 120 MG: 60 TABLET, EXTENDED RELEASE ORAL at 09:13

## 2024-03-06 RX ADMIN — HYDRALAZINE HYDROCHLORIDE 100 MG: 50 TABLET ORAL at 12:56

## 2024-03-06 RX ADMIN — FAMOTIDINE 20 MG: 20 TABLET, FILM COATED ORAL at 09:13

## 2024-03-06 RX ADMIN — CEFTRIAXONE SODIUM 1000 MG: 1 INJECTION, POWDER, FOR SOLUTION INTRAMUSCULAR; INTRAVENOUS at 13:02

## 2024-03-06 RX ADMIN — FERROUS SULFATE TAB 325 MG (65 MG ELEMENTAL FE) 325 MG: 325 (65 FE) TAB at 09:13

## 2024-03-06 RX ADMIN — CETIRIZINE HYDROCHLORIDE 10 MG: 10 TABLET ORAL at 09:13

## 2024-03-06 RX ADMIN — ISOSORBIDE MONONITRATE 120 MG: 60 TABLET, EXTENDED RELEASE ORAL at 21:18

## 2024-03-06 RX ADMIN — CLONIDINE HYDROCHLORIDE 0.3 MG: 0.2 TABLET ORAL at 21:18

## 2024-03-06 RX ADMIN — CLONIDINE HYDROCHLORIDE 0.3 MG: 0.2 TABLET ORAL at 12:57

## 2024-03-06 RX ADMIN — SODIUM CHLORIDE, PRESERVATIVE FREE 10 ML: 5 INJECTION INTRAVENOUS at 09:14

## 2024-03-06 RX ADMIN — HYDRALAZINE HYDROCHLORIDE 100 MG: 50 TABLET ORAL at 04:21

## 2024-03-06 RX ADMIN — VERAPAMIL HYDROCHLORIDE 240 MG: 240 TABLET, FILM COATED, EXTENDED RELEASE ORAL at 09:13

## 2024-03-06 RX ADMIN — HYDRALAZINE HYDROCHLORIDE 100 MG: 50 TABLET ORAL at 21:18

## 2024-03-06 NOTE — RT PROTOCOL NOTE
RT Inhaler-Nebulizer Bronchodilator Protocol Note    There is a bronchodilator order in the chart from a provider indicating to follow the RT Bronchodilator Protocol and there is an “Initiate RT Inhaler-Nebulizer Bronchodilator Protocol” order as well (see protocol at bottom of note).    CXR Findings:  No results found.    The findings from the last RT Protocol Assessment were as follows:   History Pulmonary Disease: None or smoker <15 pack years  Respiratory Pattern: Regular pattern and RR 12-20 bpm  Breath Sounds: Slightly diminished and/or crackles  Cough: Strong, productive  Indication for Bronchodilator Therapy: On home bronchodilators, Decreased or absent breath sounds  Bronchodilator Assessment Score: 3    Aerosolized bronchodilator medication orders have been revised according to the RT Inhaler-Nebulizer Bronchodilator Protocol below.    Respiratory Therapist to perform RT Therapy Protocol Assessment initially then follow the protocol.  Repeat RT Therapy Protocol Assessment PRN for score 0-3 or on second treatment, BID, and PRN for scores above 3.    No Indications - adjust the frequency to every 6 hours PRN wheezing or bronchospasm, if no treatments needed after 48 hours then discontinue using Per Protocol order mode.     If indication present, adjust the RT bronchodilator orders based on the Bronchodilator Assessment Score as indicated below.  Use Inhaler orders unless patient has one or more of the following: on home nebulizer, not able to hold breath for 10 seconds, is not alert and oriented, cannot activate and use MDI correctly, or respiratory rate 25 breaths per minute or more, then use the equivalent nebulizer order(s) with same Frequency and PRN reasons based on the score.  If a patient is on this medication at home then do not decrease Frequency below that used at home.    0-3 - enter or revise RT bronchodilator order(s) to equivalent RT Bronchodilator order with Frequency of every 4 hours PRN for 
wheezing or increased work of breathing using Per Protocol order mode.        4-6 - enter or revise RT Bronchodilator order(s) to two equivalent RT bronchodilator orders with one order with BID Frequency and one order with Frequency of every 4 hours PRN wheezing or increased work of breathing using Per Protocol order mode.        7-10 - enter or revise RT Bronchodilator order(s) to two equivalent RT bronchodilator orders with one order with TID Frequency and one order with Frequency of every 4 hours PRN wheezing or increased work of breathing using Per Protocol order mode.       11-13 - enter or revise RT Bronchodilator order(s) to one equivalent RT bronchodilator order with QID Frequency and an Albuterol order with Frequency of every 4 hours PRN wheezing or increased work of breathing using Per Protocol order mode.      Greater than 13 - enter or revise RT Bronchodilator order(s) to one equivalent RT bronchodilator order with every 4 hours Frequency and an Albuterol order with Frequency of every 2 hours PRN wheezing or increased work of breathing using Per Protocol order mode.     RT to enter RT Home Evaluation for COPD & MDI Assessment order using Per Protocol order mode.    Electronically signed by Michelle Galloway RCP on 3/6/2024 at 9:17 AM

## 2024-03-06 NOTE — CARE COORDINATION
3/6/24, 2:40 PM EST    DISCHARGE ON GOING EVALUATION    Trav Jennings       Hospital day: 9  Location: [facility] Reason for admit: Hyperkalemia [E87.5]  Elevated troponin [R79.89]  Elevated brain natriuretic peptide (BNP) level [R79.89]  Hypervolemia, unspecified hypervolemia type [E87.70]  Acute on chronic congestive heart failure, unspecified heart failure type (HCC) [I50.9]   Procedure:   2/26 CXR: Small bilateral pleural effusions with adjacent atelectasis/infiltrate    3/1 fistulagram with angioplasty    Barriers to Discharge: Hospitalist and Nephrology following. Creat 7.3 (10.5) HD every T-R-S. IV rocephin q24h. K 5.6 (6.8) Lokelma. IV hydralazine prn.     Vitals:    03/06/24 0423 03/06/24 0900 03/06/24 0918 03/06/24 1125   BP:  (!) 175/80  (!) 162/77   Pulse:  85 84 84   Resp:  17 16 16   Temp:  98.1 °F (36.7 °C)  98 °F (36.7 °C)   TempSrc:  Oral  Oral   SpO2:  97% 96% 96%   Weight: 97 kg (213 lb 14.4 oz)      Height: 1.905 m (6' 3\")      PCP: Radha Hou APRN - CNP  Readmission Risk Score: 26.2%  Patient Goals/Plan/Treatment Preferences: Home with wife. Home O2 prn through [facility]. T-R-S 0600 HD at [facility]. [facility] declined accepting patient- see MICHAEL note.

## 2024-03-06 NOTE — PLAN OF CARE
Problem: Discharge Planning  Goal: Discharge to home or other facility with appropriate resources  Outcome: Progressing  Flowsheets (Taken 3/6/2024 0329)  Discharge to home or other facility with appropriate resources:   Identify barriers to discharge with patient and caregiver   Arrange for needed discharge resources and transportation as appropriate     Problem: Safety - Adult  Goal: Free from fall injury  Outcome: Progressing  Flowsheets (Taken 3/6/2024 0329)  Free From Fall Injury: Instruct family/caregiver on patient safety     Problem: ABCDS Injury Assessment  Goal: Absence of physical injury  Outcome: Progressing  Flowsheets (Taken 3/6/2024 0329)  Absence of Physical Injury: Implement safety measures based on patient assessment     Problem: Respiratory - Adult  Goal: Achieves optimal ventilation and oxygenation  Outcome: Progressing  Flowsheets (Taken 3/6/2024 0329)  Achieves optimal ventilation and oxygenation:   Assess for changes in respiratory status   Assess for changes in mentation and behavior   Oxygen supplementation based on oxygen saturation or arterial blood gases   Assess and instruct to report shortness of breath or any respiratory difficulty     Problem: Cardiovascular - Adult  Goal: Maintains optimal cardiac output and hemodynamic stability  Outcome: Progressing  Flowsheets (Taken 3/6/2024 0329)  Maintains optimal cardiac output and hemodynamic stability:   Monitor blood pressure and heart rate   Monitor urine output and notify Licensed Independent Practitioner for values outside of normal range   Assess for signs of decreased cardiac output     Problem: Cardiovascular - Adult  Goal: Absence of cardiac dysrhythmias or at baseline  Outcome: Progressing  Flowsheets (Taken 3/6/2024 0329)  Absence of cardiac dysrhythmias or at baseline: Monitor cardiac rate and rhythm     Problem: Skin/Tissue Integrity - Adult  Goal: Skin integrity remains intact  Outcome: Progressing  Flowsheets (Taken 3/6/2024  0329)  Skin Integrity Remains Intact: Monitor for areas of redness and/or skin breakdown     Problem: Infection - Adult  Goal: Absence of infection at discharge  Outcome: Progressing  Flowsheets (Taken 3/6/2024 0329)  Absence of infection at discharge:   Assess and monitor for signs and symptoms of infection   Monitor lab/diagnostic results   Monitor all insertion sites i.e., indwelling lines, tubes and drains   Administer medications as ordered   Instruct and encourage patient and family to use good hand hygiene technique     Problem: Metabolic/Fluid and Electrolytes - Adult  Goal: Electrolytes maintained within normal limits  Outcome: Progressing  Flowsheets (Taken 3/6/2024 0329)  Electrolytes maintained within normal limits:   Monitor labs and assess patient for signs and symptoms of electrolyte imbalances   Administer electrolyte replacement as ordered   Monitor response to electrolyte replacements, including repeat lab results as appropriate   Fluid restriction as ordered     Problem: Chronic Conditions and Co-morbidities  Goal: Patient's chronic conditions and co-morbidity symptoms are monitored and maintained or improved  Outcome: Progressing  Flowsheets (Taken 3/6/2024 0329)  Care Plan - Patient's Chronic Conditions and Co-Morbidity Symptoms are Monitored and Maintained or Improved: Monitor and assess patient's chronic conditions and comorbid symptoms for stability, deterioration, or improvement     Problem: Nutrition Deficit:  Goal: Optimize nutritional status  Outcome: Progressing  Flowsheets (Taken 3/6/2024 0329)  Nutrient intake appropriate for improving, restoring, or maintaining nutritional needs:   Monitor oral intake, labs, and treatment plans   Assess nutritional status and recommend course of action     Care plan reviewed with patient and wife.  Patient and wife verbalized understanding of the plan of care and contribute to goal setting.

## 2024-03-06 NOTE — PROGRESS NOTES
Hospitalist Progress Note    Patient:  Trav Jennings    YOB: 1965  Unit/Bed:6K-13/013-A  Date of Admission: 2/26/2024  Code Status: Full Code      Assessment/Plan:    ESRD on HD:  Continue HD TTS.   Management per nephrology    Hyperkalemia: Nephrology following. S/p fistulogram with angioplasty 3/1 for persistent hyperkalemia. Dialysis with potassium bath. Lokelma 10g daily added.     Hypertensive urgency: Noted on arrival d/t missed dialysis. Hydralazine PRN. Resume home Catapres, hydralazine, Imdur, verapamil     Volume overload in setting of ESRD:  d/t missed dialysis. Resolved.     Acute on chronic hypoxic respiratory failure: Resolved.  Patient baseline of 3 LNC.  Repeat home O2 eval prior to discharge    Possible bilateral pneumonia (POA): Treated with azithromycin and ceftriaxone.     Chronic obstructive hypertrophic cardiomyopathy: TTE 05/2023 EF 65-70%, LVOT obstruction.     NIDDMII: A1c 5.5 12/2023.     Anemia of chronic disease: Hgb stable with baseline.     Chronic AAA: Follows with vascular surgery in [place]     Secondary hyperparathyroidism: on calcitriol    H/o cocaine abuse: Denies recent use    Tobacco abuse: Nicotine patch    P2P was completed with [facility] per previous notes, and declined.  Social work following.  Discharge when potassium stable and okay with nephrology    Chief Complaint: SOB    HPI / Hospital Course:   Initial HPI: \"59 y.o. male who presented to [facility] with shortness of breath; patient has a past medical history of end-stage renal disease on hemodialysis, obstructive hypertrophic cardiomyopathy, hypertension, diabetes, hyperlipidemia also cocaine and alcohol use; he states he has not missed any dialysis and had his dialysis treatment on Saturday, he states after the dialysis treatment he felt short of breath and has since progressed, he also relates to feeling lightheaded and dizzy, he states he has had a cough for the last 3 weeks,  electronically signed by Dr. Yovany Perla on 3/1/2024 11:53 AM      XR CHEST PORTABLE   Final Result   1. Small bilateral pleural effusions with adjacent atelectasis/infiltrate.   2. Moderate stable cardiomegaly.               **This report has been created using voice recognition software. It may contain minor errors which are inherent in voice recognition technology.**      Final report electronically signed by Dr. Ger Juan on 2/26/2024 1:05 PM        XR CHEST PORTABLE    Result Date: 2/26/2024  PROCEDURE: XR CHEST PORTABLE CLINICAL INFORMATION: Shortness of breath TECHNIQUE: Mobile AP chest radiographs. COMPARISON: [facility] chest radiographs 2/16/2024 FINDINGS: There is moderate stable enlargement of the cardiac silhouette. An endovascular stent is present in the thoracic aorta. There are stable widening of the mediastinum. The bilateral costophrenic angles are blunted. There are hazy and reticular opacities at the bilateral mid and lower lungs. Degenerative changes in the thoracic spine are poorly visualized.     1. Small bilateral pleural effusions with adjacent atelectasis/infiltrate. 2. Moderate stable cardiomegaly. **This report has been created using voice recognition software. It may contain minor errors which are inherent in voice recognition technology.** Final report electronically signed by Dr. Ger Juan on 2/26/2024 1:05 PM      Electronically signed by Linette Martino PA-C on 3/6/2024 at 2:58 PM

## 2024-03-06 NOTE — PROGRESS NOTES
[facility]   Nephrology progress note  3/6/2024, 10:07 AM      Pt Name:    Trav Jennings  MRN:     [id number]     YOB: 1965  Admit Date:    2/26/2024 12:25 PM    Chief Complaint: Nephrology following for ESRD and hyperkalemia    Subjective:  Patient seen and examined  No chest pain or shortness of breath  Overall feels well    Objective:  24HR INTAKE/OUTPUT:    Intake/Output Summary (Last 24 hours) at 3/6/2024 1007  Last data filed at 3/5/2024 2115  Gross per 24 hour   Intake 640 ml   Output 5400 ml   Net -4760 ml        Admission weight: 94.6 kg (208 lb 8.9 oz)  Wt Readings from Last 3 Encounters:   03/06/24 97 kg (213 lb 14.4 oz)   02/16/24 92.6 kg (204 lb 2.3 oz)   02/06/24 94.4 kg (208 lb 1.8 oz)        Vitals :   Vitals:    03/06/24 0415 03/06/24 0423 03/06/24 0900 03/06/24 0918   BP: (!) 165/81  (!) 175/80    Pulse: 77  85 84   Resp: 18  17 16   Temp: 98.1 °F (36.7 °C)  98.1 °F (36.7 °C)    TempSrc: Oral  Oral    SpO2: 99%  97% 96%   Weight:  97 kg (213 lb 14.4 oz)     Height:  1.905 m (6' 3\")         Physical examination  General Appearance:  Well developed. No distress  Mouth/Throat:  Oral mucosa moist  Neck:  Supple, no JVD  Lungs:  Breath sounds: clear  Heart::  S1,S2 heard  Abdomen:  Soft, non - tender  Musculoskeletal:  Edema -resolved    Medications:  Infusion:    sodium chloride       Meds:    cefTRIAXone (ROCEPHIN) IV  1,000 mg IntraVENous Q24H    hydrALAZINE  100 mg Oral 3 times per day    HYDROmorphone  1 mg IntraVENous Once    lidocaine   Topical Once    midazolam  1 mg IntraVENous Once    cetirizine  10 mg Oral Daily    albuterol  2.5 mg Nebulization Once    aspirin  81 mg Oral Daily    atorvastatin  40 mg Oral Nightly    calcitRIOL  0.5 mcg Oral Once per day on Tue Thu Sat    cinacalcet  150 mg Oral Once per day on Tue Thu Sat    cloNIDine  0.3 mg Oral TID    famotidine  20 mg Oral Daily    ferrous sulfate  325 mg Oral TID     folic acid  1 mg Oral Daily     isosorbide mononitrate  120 mg Oral BID    sertraline  100 mg Oral Daily    verapamil  240 mg Oral Daily    sodium chloride flush  5-40 mL IntraVENous 2 times per day    [Held by provider] heparin (porcine)  5,000 Units SubCUTAneous 3 times per day    nicotine  1 patch TransDERmal Q24H       Lab Data :  CBC:   No results for input(s): \"WBC\", \"HGB\", \"HCT\", \"PLT\" in the last 72 hours.    CMP:  Recent Labs     03/04/24  0740 03/05/24  0612 03/06/24  0618    136 140   K 5.7* 6.8* 5.6*    101 102   CO2 21* 24 23   BUN 63* 85* 54*   CREATININE 8.2* 10.5* 7.3*   GLUCOSE 109* 72 87   CALCIUM 7.8* 8.1* 7.3*       Assessment and Plan:  Renal -ESRD on hemodialysis Tuesday Thursday Saturday  Volume status reasonable potassium is slightly high  However no acute need for dialysis  Electrolytes -potassium continues to trend higher.  Will add Lokelma 10 g daily  Essential hypertension overall bladder with mild fluctuations add Cardura 4 mg at night  Diabetes mellitus  Anemia of renal dysfunction  Meds reviewed and discussed with patient and wife  Have done a P2P review with [facility] on 3/5/2024    Ottoniel Pruett MD  [facility]    This report has been created using voice recognition software. It may contain minor errors which are inherent in voice recognition technology

## 2024-03-07 LAB
ANION GAP SERPL CALC-SCNC: 19 MEQ/L (ref 8–16)
BUN SERPL-MCNC: 80 MG/DL (ref 7–22)
CALCIUM SERPL-MCNC: 7.8 MG/DL (ref 8.5–10.5)
CHLORIDE SERPL-SCNC: 99 MEQ/L (ref 98–111)
CO2 SERPL-SCNC: 21 MEQ/L (ref 23–33)
CREAT SERPL-MCNC: 9.8 MG/DL (ref 0.4–1.2)
GFR SERPL CREATININE-BSD FRML MDRD: 6 ML/MIN/1.73M2
GLUCOSE SERPL-MCNC: 153 MG/DL (ref 70–108)
POTASSIUM SERPL-SCNC: 5.5 MEQ/L (ref 3.5–5.2)
SODIUM SERPL-SCNC: 139 MEQ/L (ref 135–145)

## 2024-03-07 PROCEDURE — 36415 COLL VENOUS BLD VENIPUNCTURE: CPT

## 2024-03-07 PROCEDURE — 6370000000 HC RX 637 (ALT 250 FOR IP): Performed by: NURSE PRACTITIONER

## 2024-03-07 PROCEDURE — 2580000003 HC RX 258: Performed by: INTERNAL MEDICINE

## 2024-03-07 PROCEDURE — 80048 BASIC METABOLIC PNL TOTAL CA: CPT

## 2024-03-07 PROCEDURE — 6360000002 HC RX W HCPCS: Performed by: INTERNAL MEDICINE

## 2024-03-07 PROCEDURE — 90935 HEMODIALYSIS ONE EVALUATION: CPT

## 2024-03-07 PROCEDURE — 6360000002 HC RX W HCPCS: Performed by: NURSE PRACTITIONER

## 2024-03-07 PROCEDURE — 6370000000 HC RX 637 (ALT 250 FOR IP): Performed by: INTERNAL MEDICINE

## 2024-03-07 PROCEDURE — 90935 HEMODIALYSIS ONE EVALUATION: CPT | Performed by: INTERNAL MEDICINE

## 2024-03-07 PROCEDURE — 99232 SBSQ HOSP IP/OBS MODERATE 35: CPT | Performed by: PHYSICIAN ASSISTANT

## 2024-03-07 PROCEDURE — 2580000003 HC RX 258: Performed by: NURSE PRACTITIONER

## 2024-03-07 PROCEDURE — 1200000000 HC SEMI PRIVATE

## 2024-03-07 RX ADMIN — FERROUS SULFATE TAB 325 MG (65 MG ELEMENTAL FE) 325 MG: 325 (65 FE) TAB at 13:07

## 2024-03-07 RX ADMIN — ISOSORBIDE MONONITRATE 120 MG: 60 TABLET, EXTENDED RELEASE ORAL at 07:46

## 2024-03-07 RX ADMIN — CEFTRIAXONE SODIUM 1000 MG: 1 INJECTION, POWDER, FOR SOLUTION INTRAMUSCULAR; INTRAVENOUS at 13:06

## 2024-03-07 RX ADMIN — HYDRALAZINE HYDROCHLORIDE 100 MG: 50 TABLET ORAL at 13:07

## 2024-03-07 RX ADMIN — HYDRALAZINE HYDROCHLORIDE 100 MG: 50 TABLET ORAL at 06:05

## 2024-03-07 RX ADMIN — SERTRALINE 100 MG: 100 TABLET, FILM COATED ORAL at 07:46

## 2024-03-07 RX ADMIN — FERROUS SULFATE TAB 325 MG (65 MG ELEMENTAL FE) 325 MG: 325 (65 FE) TAB at 07:46

## 2024-03-07 RX ADMIN — ATORVASTATIN CALCIUM 40 MG: 40 TABLET, FILM COATED ORAL at 20:43

## 2024-03-07 RX ADMIN — VERAPAMIL HYDROCHLORIDE 240 MG: 240 TABLET, FILM COATED, EXTENDED RELEASE ORAL at 07:46

## 2024-03-07 RX ADMIN — CLONIDINE HYDROCHLORIDE 0.3 MG: 0.2 TABLET ORAL at 07:46

## 2024-03-07 RX ADMIN — FAMOTIDINE 20 MG: 20 TABLET, FILM COATED ORAL at 07:45

## 2024-03-07 RX ADMIN — SODIUM CHLORIDE, PRESERVATIVE FREE 10 ML: 5 INJECTION INTRAVENOUS at 20:43

## 2024-03-07 RX ADMIN — CALCITRIOL CAPSULES 0.25 MCG 0.5 MCG: 0.25 CAPSULE ORAL at 07:46

## 2024-03-07 RX ADMIN — FERROUS SULFATE TAB 325 MG (65 MG ELEMENTAL FE) 325 MG: 325 (65 FE) TAB at 17:23

## 2024-03-07 RX ADMIN — SODIUM ZIRCONIUM CYCLOSILICATE 10 G: 10 POWDER, FOR SUSPENSION ORAL at 07:52

## 2024-03-07 RX ADMIN — CLONIDINE HYDROCHLORIDE 0.3 MG: 0.2 TABLET ORAL at 20:43

## 2024-03-07 RX ADMIN — CINACALCET HYDROCHLORIDE 150 MG: 30 TABLET, FILM COATED ORAL at 07:46

## 2024-03-07 RX ADMIN — CLONIDINE HYDROCHLORIDE 0.3 MG: 0.2 TABLET ORAL at 13:07

## 2024-03-07 RX ADMIN — SODIUM CHLORIDE, PRESERVATIVE FREE 10 ML: 5 INJECTION INTRAVENOUS at 07:50

## 2024-03-07 RX ADMIN — HYDRALAZINE HYDROCHLORIDE 100 MG: 50 TABLET ORAL at 20:43

## 2024-03-07 RX ADMIN — FOLIC ACID 1 MG: 1 TABLET ORAL at 07:53

## 2024-03-07 RX ADMIN — DOXAZOSIN 4 MG: 4 TABLET ORAL at 20:43

## 2024-03-07 RX ADMIN — ISOSORBIDE MONONITRATE 120 MG: 60 TABLET, EXTENDED RELEASE ORAL at 20:43

## 2024-03-07 RX ADMIN — CETIRIZINE HYDROCHLORIDE 10 MG: 10 TABLET ORAL at 07:46

## 2024-03-07 RX ADMIN — HYDRALAZINE HYDROCHLORIDE 10 MG: 20 INJECTION, SOLUTION INTRAMUSCULAR; INTRAVENOUS at 07:46

## 2024-03-07 RX ADMIN — ASPIRIN 81 MG: 81 TABLET, COATED ORAL at 07:46

## 2024-03-07 NOTE — PROGRESS NOTES
[facility]   Nephrology progress note  3/7/2024, 10:26 AM      Pt Name:    Trav Jennings  MRN:     [id number]     YOB: 1965  Admit Date:    2/26/2024 12:25 PM    Chief Complaint: Nephrology following for ESRD and hyperkalemia    Subjective:  Patient seen and examined  No chest pain or shortness of breath  Overall feels well    Objective:  24HR INTAKE/OUTPUT:    Intake/Output Summary (Last 24 hours) at 3/7/2024 1026  Last data filed at 3/7/2024 0724  Gross per 24 hour   Intake 1140 ml   Output 0 ml   Net 1140 ml        Admission weight: 94.6 kg (208 lb 8.9 oz)  Wt Readings from Last 3 Encounters:   03/07/24 99 kg (218 lb 4.1 oz)   02/16/24 92.6 kg (204 lb 2.3 oz)   02/06/24 94.4 kg (208 lb 1.8 oz)        Vitals :   Vitals:    03/06/24 2113 03/07/24 0009 03/07/24 0715 03/07/24 0810   BP: (!) 188/84 (!) 166/87 (!) 195/84 (!) 182/89   Pulse: 81 86 98 100   Resp: 18 16 17 16   Temp: 98 °F (36.7 °C) 97.3 °F (36.3 °C) 97.5 °F (36.4 °C) 98.1 °F (36.7 °C)   TempSrc: Oral Oral Oral    SpO2: 97% 95% 93% 94%   Weight:    99 kg (218 lb 4.1 oz)   Height:           Physical examination  General Appearance:  Well developed. No distress  Mouth/Throat:  Oral mucosa moist  Neck:  Supple, no JVD  Lungs:  Breath sounds: clear  Heart::  S1,S2 heard  Abdomen:  Soft, non - tender  Musculoskeletal:  Edema -resolved    Medications:  Infusion:    sodium chloride       Meds:    doxazosin  4 mg Oral Nightly    sodium zirconium cyclosilicate  10 g Oral Daily    cefTRIAXone (ROCEPHIN) IV  1,000 mg IntraVENous Q24H    hydrALAZINE  100 mg Oral 3 times per day    HYDROmorphone  1 mg IntraVENous Once    lidocaine   Topical Once    midazolam  1 mg IntraVENous Once    cetirizine  10 mg Oral Daily    albuterol  2.5 mg Nebulization Once    aspirin  81 mg Oral Daily    atorvastatin  40 mg Oral Nightly    calcitRIOL  0.5 mcg Oral Once per day on Tue Thu Sat    cinacalcet  150 mg Oral Once per day on Tue Thu Sat     cloNIDine  0.3 mg Oral TID    famotidine  20 mg Oral Daily    ferrous sulfate  325 mg Oral TID WC    folic acid  1 mg Oral Daily    isosorbide mononitrate  120 mg Oral BID    sertraline  100 mg Oral Daily    verapamil  240 mg Oral Daily    sodium chloride flush  5-40 mL IntraVENous 2 times per day    [Held by provider] heparin (porcine)  5,000 Units SubCUTAneous 3 times per day    nicotine  1 patch TransDERmal Q24H       Lab Data :  CBC:   No results for input(s): \"WBC\", \"HGB\", \"HCT\", \"PLT\" in the last 72 hours.    CMP:  Recent Labs     03/05/24  0612 03/06/24  0618 03/07/24  0733    140 139   K 6.8* 5.6* 5.5*    102 99   CO2 24 23 21*   BUN 85* 54* 80*   CREATININE 10.5* 7.3* 9.8*   GLUCOSE 72 87 153*   CALCIUM 8.1* 7.3* 7.8*       Assessment and Plan:  Renal -ESRD on hemodialysis Tuesday Thursday Saturday  Volume status reasonable potassium is slightly high  Seen during dialysis tolerating procedure well blood pressure 1800 UF 5000  Electrolytes -potassium continues to trend higher.  Should be corrected with dialysis also will continue Lokelma 10 g daily  Essential hypertension running slightly higher mild fluctuations added Cardura 4 mg at night  Diabetes mellitus  Anemia of renal dysfunction  Meds reviewed and discussed with patient     Ottoniel Pruett MD  [facility]    This report has been created using voice recognition software. It may contain minor errors which are inherent in voice recognition technology

## 2024-03-07 NOTE — PROGRESS NOTES
Hospitalist Progress Note    Patient:  Trav Jennings    YOB: 1965  Unit/Bed:6K-13/013-A  Date of Admission: 2/26/2024  Code Status: Full Code      Assessment/Plan:    ESRD on HD:  Continue HD TTS.   Management per nephrology    Hyperkalemia: Nephrology following. S/p fistulogram with angioplasty 3/1 for persistent hyperkalemia. Dialysis with potassium bath. Lokelma 10g daily added.   K still elevated today (3/7). Should correct with dialysis. On Lokelma daily per Neph. Repeat BMP in AM.     Hypertensive urgency: Noted on arrival d/t missed dialysis. Hydralazine PRN. Resume home Catapres, hydralazine, Imdur, verapamil     Volume overload in setting of ESRD:  d/t missed dialysis. Resolved.     Acute on chronic hypoxic respiratory failure: Resolved.  Patient baseline of 3 LNC.  Repeat home O2 eval prior to discharge    Possible bilateral pneumonia (POA): Treated with azithromycin and ceftriaxone.     Chronic obstructive hypertrophic cardiomyopathy: TTE 05/2023 EF 65-70%, LVOT obstruction.     NIDDMII: A1c 5.5 12/2023.     Anemia of chronic disease: Hgb stable with baseline.     Chronic AAA: Follows with vascular surgery in [place]     Secondary hyperparathyroidism: on calcitriol    H/o cocaine abuse: Denies recent use    Tobacco abuse: Nicotine patch    P2P was completed with [facility] per previous notes, and declined.  Social work following.  Discharge when potassium stable and okay with nephrology    Chief Complaint: SOB    HPI / Hospital Course:   Initial HPI: \"59 y.o. male who presented to [facility] with shortness of breath; patient has a past medical history of end-stage renal disease on hemodialysis, obstructive hypertrophic cardiomyopathy, hypertension, diabetes, hyperlipidemia also cocaine and alcohol use; he states he has not missed any dialysis and had his dialysis treatment on Saturday, he states after the dialysis treatment he felt short of breath and has since  last 72 hours.  Recent Labs     03/05/24  0612 03/06/24  0618 03/07/24  0733    140 139   K 6.8* 5.6* 5.5*    102 99   CO2 24 23 21*   BUN 85* 54* 80*   CREATININE 10.5* 7.3* 9.8*   CALCIUM 8.1* 7.3* 7.8*       No results for input(s): \"AST\", \"ALT\", \"BILIDIR\", \"BILITOT\", \"ALKPHOS\" in the last 72 hours.  No results for input(s): \"INR\" in the last 72 hours.  No results for input(s): \"CKTOTAL\", \"TROPONINI\" in the last 72 hours.  Urinalysis:   Lab Results   Component Value Date/Time    NITRU NEGATIVE 04/03/2018 07:45 PM    WBCUA 2-4 04/03/2018 07:45 PM    BACTERIA NONE 04/03/2018 07:45 PM    RBCUA 5-10 04/03/2018 07:45 PM    BLOODU SMALL 04/03/2018 07:45 PM    SPECGRAV 1.014 03/07/2018 09:10 PM    GLUCOSEU NEGATIVE 04/03/2018 07:45 PM     Urine culture: No results found for: \"LABURIN\"  Micro:   Blood culture #1:   Lab Results   Component Value Date/Time    BC  02/04/2024 12:58 PM     No growth 24 hours. No growth 48 hours. No growth at 5 days     Blood culture #2:No results found for: \"BLOODCULT2\"  Organism:  Lab Results   Component Value Date/Time    ORG Staphylococcus epidermidis 10/10/2022 03:25 PM         Lab Results   Component Value Date/Time    LABGRAM  10/10/2022 03:25 PM     Rare segmented neutrophils observed. Rare epithelial cells observed. No organisms observed.     MRSA culture only:No results found for: \"MRSAC\"  Respiratory culture: No results found for: \"CULTRESP\"  Aerobic and Anaerobic :  Lab Results   Component Value Date/Time    LABAERO No Acinetobacter species isolated. 12/24/2023 12:01 AM     Lab Results   Component Value Date/Time    LABANAE  10/10/2022 03:25 PM     No anaerobes isolated- preliminary No anaerobes isolated       Radiology Reports:  IR ANGIOGRAM ARTERIOVENOUS SHUNT   Final Result   Status post dialysis fistulogram with angioplasty, as outlined above.            **This report has been created using voice recognition software.  It may contain minor errors which are inherent

## 2024-03-07 NOTE — PLAN OF CARE
Problem: Discharge Planning  Goal: Discharge to home or other facility with appropriate resources  Outcome: Progressing  Flowsheets (Taken 3/6/2024 0329 by Jane Sanchez, RN)  Discharge to home or other facility with appropriate resources:   Identify barriers to discharge with patient and caregiver   Arrange for needed discharge resources and transportation as appropriate     Problem: Safety - Adult  Goal: Free from fall injury  Outcome: Progressing  Flowsheets (Taken 3/6/2024 0329 by Jane Sanchez, RN)  Free From Fall Injury: Instruct family/caregiver on patient safety     Problem: ABCDS Injury Assessment  Goal: Absence of physical injury  Outcome: Progressing  Flowsheets (Taken 3/6/2024 0329 by Jane Sanchez, RN)  Absence of Physical Injury: Implement safety measures based on patient assessment     Problem: Respiratory - Adult  Goal: Achieves optimal ventilation and oxygenation  Outcome: Progressing  Flowsheets (Taken 3/6/2024 0329 by Jane Sanchez, RN)  Achieves optimal ventilation and oxygenation:   Assess for changes in respiratory status   Assess for changes in mentation and behavior   Oxygen supplementation based on oxygen saturation or arterial blood gases   Assess and instruct to report shortness of breath or any respiratory difficulty     Problem: Cardiovascular - Adult  Goal: Maintains optimal cardiac output and hemodynamic stability  Outcome: Progressing  Flowsheets (Taken 3/6/2024 0329 by Jane Sanchez, RN)  Maintains optimal cardiac output and hemodynamic stability:   Monitor blood pressure and heart rate   Monitor urine output and notify Licensed Independent Practitioner for values outside of normal range   Assess for signs of decreased cardiac output  Goal: Absence of cardiac dysrhythmias or at baseline  Outcome: Progressing  Flowsheets (Taken 3/6/2024 0329 by Jane Sanchez, RN)  Absence of cardiac dysrhythmias or at baseline: Monitor cardiac rate and rhythm     Problem: Skin/Tissue Integrity  oxygen saturation probe site  4.  Every 4-6 hours:  If on nasal continuous positive airway pressure, respiratory therapy assess nares and determine need for appliance change or resting period.  Outcome: Progressing     Problem: Genitourinary - Adult  Goal: Absence of urinary retention  Outcome: Progressing  Flowsheets (Taken 3/3/2024 0815 by Jyoti Pedroza, RN)  Absence of urinary retention: Assess patient’s ability to void and empty bladder     Problem: Chronic Conditions and Co-morbidities  Goal: Patient's chronic conditions and co-morbidity symptoms are monitored and maintained or improved  Outcome: Progressing  Flowsheets (Taken 3/6/2024 0329 by Jane Sanchez, RN)  Care Plan - Patient's Chronic Conditions and Co-Morbidity Symptoms are Monitored and Maintained or Improved: Monitor and assess patient's chronic conditions and comorbid symptoms for stability, deterioration, or improvement     Problem: Pain  Goal: Verbalizes/displays adequate comfort level or baseline comfort level  Outcome: Progressing  Flowsheets (Taken 3/5/2024 0114 by Jane Sanchez, RN)  Verbalizes/displays adequate comfort level or baseline comfort level:   Encourage patient to monitor pain and request assistance   Assess pain using appropriate pain scale     Problem: Nutrition Deficit:  Goal: Optimize nutritional status  Outcome: Progressing  Flowsheets (Taken 3/6/2024 0329 by Jane Sanchez, RN)  Nutrient intake appropriate for improving, restoring, or maintaining nutritional needs:   Monitor oral intake, labs, and treatment plans   Assess nutritional status and recommend course of action

## 2024-03-07 NOTE — FLOWSHEET NOTE
03/07/24 0810 03/07/24 1240   Vital Signs   BP (!) 182/89 (!) 153/77   Temp 98.1 °F (36.7 °C) 98.3 °F (36.8 °C)   Pulse 100 96   Respirations 16 21   SpO2 94 % 96 %   Weight - Scale 99 kg (218 lb 4.1 oz) 94 kg (207 lb 3.7 oz)   Weight Method Standing scale Estimated   Percent Weight Change 2.04 -5.05   Post-Hemodialysis Assessment   Post-Treatment Procedures  --  Blood returned;Access bleeding time < 10 minutes   Machine Disinfection Process  --  Acid/Vinegar Clean;Heat Disinfect;Exterior Machine Disinfection   Blood Volume Processed (Liters)  --  112.4 L   Dialyzer Clearance  --  Lightly streaked   Duration of Treatment (minutes)  --  240 minutes   Heparin Amount Administered During Treatment (mL)  --  0 mL   Hemodialysis Intake (ml)  --  400 ml   Hemodialysis Output (ml)  --  5400 ml   NET Removed (ml)  --  5000     4 hour treatment completed. 5L of fluid removed to get to EDW. Patient tolerated fluid removal well. Pressure held to access for 10 mins x2. Dressing clean/dry and intact upon leaving the unit. Report given to primary RN. Charting printed and placed in bin to be scanned into EMR.

## 2024-03-07 NOTE — PROGRESS NOTES
Sent secure message to Linette SURESH regarding possible discharge tomorrow pt states he needs refills on all medications.

## 2024-03-07 NOTE — CARE COORDINATION
3/7/24, 1:43 PM EST    DISCHARGE ON GOING EVALUATION    Trav Jennings       Hospital day: 10  Location: [facility]13/013 Reason for admit: Hyperkalemia [E87.5]  Elevated troponin [R79.89]  Elevated brain natriuretic peptide (BNP) level [R79.89]  Hypervolemia, unspecified hypervolemia type [E87.70]  Acute on chronic congestive heart failure, unspecified heart failure type (HCC) [I50.9]   Procedure: 2/26 CXR: Small bilateral pleural effusions with adjacent atelectasis/infiltrate    3/1 fistulagram with angioplasty  Barriers to Discharge: K+5.5. 4hr HD treatment today and lokelma ordered daily. Discharge when okay with nephro.   PCP: Radha Hou APRN - CNP  Readmission Risk Score: 26.4%  Patient Goals/Plan/Treatment Preferences: Home with wife. Needs new home O2 eval and orders at discharge, currently uses 2-4L from [facility]. TTS HD at [facility].

## 2024-03-08 VITALS
HEIGHT: 75 IN | BODY MASS INDEX: 25.77 KG/M2 | OXYGEN SATURATION: 97 % | RESPIRATION RATE: 18 BRPM | SYSTOLIC BLOOD PRESSURE: 184 MMHG | DIASTOLIC BLOOD PRESSURE: 80 MMHG | HEART RATE: 95 BPM | WEIGHT: 207.23 LBS | TEMPERATURE: 97.7 F

## 2024-03-08 PROBLEM — E44.1 MILD MALNUTRITION (HCC): Status: RESOLVED | Noted: 2024-03-05 | Resolved: 2024-03-08

## 2024-03-08 LAB
ANION GAP SERPL CALC-SCNC: 15 MEQ/L (ref 8–16)
BUN SERPL-MCNC: 46 MG/DL (ref 7–22)
CALCIUM SERPL-MCNC: 7.4 MG/DL (ref 8.5–10.5)
CHLORIDE SERPL-SCNC: 99 MEQ/L (ref 98–111)
CO2 SERPL-SCNC: 22 MEQ/L (ref 23–33)
CREAT SERPL-MCNC: 6.8 MG/DL (ref 0.4–1.2)
GFR SERPL CREATININE-BSD FRML MDRD: 9 ML/MIN/1.73M2
GLUCOSE SERPL-MCNC: 79 MG/DL (ref 70–108)
POTASSIUM SERPL-SCNC: 4.8 MEQ/L (ref 3.5–5.2)
SODIUM SERPL-SCNC: 136 MEQ/L (ref 135–145)

## 2024-03-08 PROCEDURE — 2580000003 HC RX 258: Performed by: NURSE PRACTITIONER

## 2024-03-08 PROCEDURE — 6370000000 HC RX 637 (ALT 250 FOR IP): Performed by: INTERNAL MEDICINE

## 2024-03-08 PROCEDURE — 6370000000 HC RX 637 (ALT 250 FOR IP): Performed by: NURSE PRACTITIONER

## 2024-03-08 PROCEDURE — 99239 HOSP IP/OBS DSCHRG MGMT >30: CPT | Performed by: NURSE PRACTITIONER

## 2024-03-08 PROCEDURE — 36415 COLL VENOUS BLD VENIPUNCTURE: CPT

## 2024-03-08 PROCEDURE — 80048 BASIC METABOLIC PNL TOTAL CA: CPT

## 2024-03-08 PROCEDURE — 99232 SBSQ HOSP IP/OBS MODERATE 35: CPT | Performed by: INTERNAL MEDICINE

## 2024-03-08 RX ORDER — CINACALCET 30 MG/1
150 TABLET, FILM COATED ORAL
Qty: 30 TABLET | Refills: 3 | Status: SHIPPED | OUTPATIENT
Start: 2024-03-09 | End: 2024-03-08

## 2024-03-08 RX ORDER — CINACALCET 30 MG/1
150 TABLET, FILM COATED ORAL
Qty: 30 TABLET | Refills: 3 | Status: SHIPPED | OUTPATIENT
Start: 2024-03-11

## 2024-03-08 RX ORDER — DOXAZOSIN MESYLATE 4 MG/1
4 TABLET ORAL NIGHTLY
Qty: 30 TABLET | Refills: 3 | Status: SHIPPED | OUTPATIENT
Start: 2024-03-08

## 2024-03-08 RX ORDER — HYDRALAZINE HYDROCHLORIDE 100 MG/1
100 TABLET, FILM COATED ORAL EVERY 8 HOURS SCHEDULED
Qty: 90 TABLET | Refills: 3 | Status: SHIPPED | OUTPATIENT
Start: 2024-03-08

## 2024-03-08 RX ADMIN — SODIUM CHLORIDE, PRESERVATIVE FREE 10 ML: 5 INJECTION INTRAVENOUS at 08:48

## 2024-03-08 RX ADMIN — CLONIDINE HYDROCHLORIDE 0.3 MG: 0.2 TABLET ORAL at 08:47

## 2024-03-08 RX ADMIN — SERTRALINE 100 MG: 100 TABLET, FILM COATED ORAL at 08:48

## 2024-03-08 RX ADMIN — FAMOTIDINE 20 MG: 20 TABLET, FILM COATED ORAL at 08:47

## 2024-03-08 RX ADMIN — CETIRIZINE HYDROCHLORIDE 10 MG: 10 TABLET ORAL at 08:47

## 2024-03-08 RX ADMIN — FERROUS SULFATE TAB 325 MG (65 MG ELEMENTAL FE) 325 MG: 325 (65 FE) TAB at 08:48

## 2024-03-08 RX ADMIN — ASPIRIN 81 MG: 81 TABLET, COATED ORAL at 08:47

## 2024-03-08 RX ADMIN — SODIUM ZIRCONIUM CYCLOSILICATE 10 G: 10 POWDER, FOR SUSPENSION ORAL at 08:48

## 2024-03-08 RX ADMIN — FOLIC ACID 1 MG: 1 TABLET ORAL at 08:48

## 2024-03-08 RX ADMIN — HYDRALAZINE HYDROCHLORIDE 100 MG: 50 TABLET ORAL at 05:05

## 2024-03-08 RX ADMIN — VERAPAMIL HYDROCHLORIDE 240 MG: 240 TABLET, FILM COATED, EXTENDED RELEASE ORAL at 08:47

## 2024-03-08 RX ADMIN — ISOSORBIDE MONONITRATE 120 MG: 60 TABLET, EXTENDED RELEASE ORAL at 08:47

## 2024-03-08 NOTE — PLAN OF CARE
Problem: Discharge Planning  Goal: Discharge to home or other facility with appropriate resources  Outcome: Progressing     Problem: Safety - Adult  Goal: Free from fall injury  Outcome: Progressing     Problem: ABCDS Injury Assessment  Goal: Absence of physical injury  Outcome: Progressing     Problem: Respiratory - Adult  Goal: Achieves optimal ventilation and oxygenation  Outcome: Progressing     Problem: Cardiovascular - Adult  Goal: Maintains optimal cardiac output and hemodynamic stability  Outcome: Progressing  Goal: Absence of cardiac dysrhythmias or at baseline  Outcome: Progressing     Problem: Skin/Tissue Integrity - Adult  Goal: Skin integrity remains intact  Outcome: Progressing  Goal: Incisions, wounds, or drain sites healing without S/S of infection  Outcome: Progressing  Goal: Oral mucous membranes remain intact  Outcome: Progressing     Problem: Musculoskeletal - Adult  Goal: Return mobility to safest level of function  Outcome: Progressing  Goal: Maintain proper alignment of affected body part  Outcome: Progressing  Goal: Return ADL status to a safe level of function  Outcome: Progressing     Problem: Infection - Adult  Goal: Absence of infection at discharge  Outcome: Progressing  Goal: Absence of infection during hospitalization  Outcome: Progressing  Goal: Absence of fever/infection during anticipated neutropenic period  Outcome: Progressing     Problem: Metabolic/Fluid and Electrolytes - Adult  Goal: Electrolytes maintained within normal limits  Outcome: Progressing  Goal: Hemodynamic stability and optimal renal function maintained  Outcome: Progressing  Goal: Glucose maintained within prescribed range  Outcome: Progressing     Problem: Hematologic - Adult  Goal: Maintains hematologic stability  Outcome: Progressing     Problem: Skin/Tissue Integrity  Goal: Absence of new skin breakdown  Description: 1.  Monitor for areas of redness and/or skin breakdown  2.  Assess vascular access sites  hourly  3.  Every 4-6 hours minimum:  Change oxygen saturation probe site  4.  Every 4-6 hours:  If on nasal continuous positive airway pressure, respiratory therapy assess nares and determine need for appliance change or resting period.  Outcome: Progressing     Problem: Genitourinary - Adult  Goal: Absence of urinary retention  Outcome: Progressing     Problem: Chronic Conditions and Co-morbidities  Goal: Patient's chronic conditions and co-morbidity symptoms are monitored and maintained or improved  Outcome: Progressing     Problem: Pain  Goal: Verbalizes/displays adequate comfort level or baseline comfort level  Outcome: Progressing     Problem: Nutrition Deficit:  Goal: Optimize nutritional status  Outcome: Progressing

## 2024-03-08 NOTE — PROGRESS NOTES
DC instructions and medication changes discussed with wife and patient. Both voiced understanding. Discussed fluid restriction with patient also. Patient voiced understanding. Patient dc'd with all belongings.

## 2024-03-08 NOTE — CARE COORDINATION
Pt verbalized understanding and gave permission for possible discharge within 4 hours of receiving IMM.     Spoke with Trav and his wife, Trav states he is leaving in a few minutes. He has not had repeat home oxygen evaluation or new orders as needed per  DME. Trav states he will reach out to them as outpatient to complete, as he is ready to go home now. Electronically signed by Kiet Perez RN on 3/8/2024 at 10:29 AM     Update: Emily LEAL informs that she received a message from  pharmacy that they are not able to fill his sensispar prescription and it would need to go to his usual pharmacy. Voicemail message left for Trav to see which pharmacy he uses for the sensispar. Electronically signed by Kiet Perez RN on 3/8/2024 at 11:01 AM

## 2024-03-08 NOTE — PROGRESS NOTES
[facility]   Nephrology progress note  3/8/2024, 12:14 PM      Pt Name:    Trav Jennings  MRN:     [id number]     YOB: 1965  Admit Date:    2/26/2024 12:25 PM    Chief Complaint: Nephrology following for ESRD and hyperkalemia    Subjective:  Patient seen and examined this morning this is a late entry  No chest pain or shortness of breath  Overall feels well  [facility]    Objective:  24HR INTAKE/OUTPUT:    Intake/Output Summary (Last 24 hours) at 3/8/2024 1214  Last data filed at 3/7/2024 1833  Gross per 24 hour   Intake 760 ml   Output 5400 ml   Net -4640 ml        Admission weight: 94.6 kg (208 lb 8.9 oz)  Wt Readings from Last 3 Encounters:   03/07/24 94 kg (207 lb 3.7 oz)   02/16/24 92.6 kg (204 lb 2.3 oz)   02/06/24 94.4 kg (208 lb 1.8 oz)        Vitals :   Vitals:    03/07/24 2035 03/08/24 0019 03/08/24 0348 03/08/24 0845   BP: (!) 165/64 (!) 144/65 (!) 151/76 (!) 184/80   Pulse: 79 81 82 95   Resp: 18 18 18 18   Temp: 97.9 °F (36.6 °C) 97.8 °F (36.6 °C) 98.8 °F (37.1 °C) 97.7 °F (36.5 °C)   TempSrc: Oral Oral Oral Oral   SpO2: 95% 96% 98% 97%   Weight:       Height:           Physical examination  General Appearance:  Well developed. No distress  Mouth/Throat:  Oral mucosa moist  Neck:  Supple, no JVD  Lungs:  Breath sounds: clear  Heart::  S1,S2 heard  Abdomen:  Soft, non - tender  Musculoskeletal:  Edema -resolved    Medications:  Infusion:    sodium chloride       Meds:    doxazosin  4 mg Oral Nightly    sodium zirconium cyclosilicate  10 g Oral Daily    hydrALAZINE  100 mg Oral 3 times per day    HYDROmorphone  1 mg IntraVENous Once    lidocaine   Topical Once    midazolam  1 mg IntraVENous Once    cetirizine  10 mg Oral Daily    albuterol  2.5 mg Nebulization Once    aspirin  81 mg Oral Daily    atorvastatin  40 mg Oral Nightly    calcitRIOL  0.5 mcg Oral Once per day on Tue Thu Sat    cinacalcet  150 mg Oral Once per day on Tue Thu  Sat    cloNIDine  0.3 mg Oral TID    famotidine  20 mg Oral Daily    ferrous sulfate  325 mg Oral TID WC    folic acid  1 mg Oral Daily    isosorbide mononitrate  120 mg Oral BID    sertraline  100 mg Oral Daily    verapamil  240 mg Oral Daily    sodium chloride flush  5-40 mL IntraVENous 2 times per day    [Held by provider] heparin (porcine)  5,000 Units SubCUTAneous 3 times per day    nicotine  1 patch TransDERmal Q24H       Lab Data :  CBC:   No results for input(s): \"WBC\", \"HGB\", \"HCT\", \"PLT\" in the last 72 hours.    CMP:  Recent Labs     03/06/24  0618 03/07/24  0733 03/08/24  0547    139 136   K 5.6* 5.5* 4.8    99 99   CO2 23 21* 22*   BUN 54* 80* 46*   CREATININE 7.3* 9.8* 6.8*   GLUCOSE 87 153* 79   CALCIUM 7.3* 7.8* 7.4*       Assessment and Plan:  Renal -ESRD on hemodialysis Tuesday Thursday Saturday  Volume status reasonable, electrolytes stable  No acute need for dialysis today.  Electrolytes -potassium continues to trend higher.  Continue Lokelma 10 daily even as an outpatient  Essential hypertension extremity.  Diabetes mellitus  Anemia of renal dysfunction  Meds reviewed and discussed with patient and wife in detail    Ottoniel Pruett MD  [facility]    This report has been created using voice recognition software. It may contain minor errors which are inherent in voice recognition technology

## 2024-03-08 NOTE — DISCHARGE SUMMARY
Hospital Medicine Discharge Summary      Patient Identification:   Trav Jennings   : 1965  MRN: [id number]   Account: [id number]      Patient's PCP: Radha Hou APRN - CNP    Admit Date: 2024     Discharge Date:   24    Admitting Physician: No admitting provider for patient encounter.     Discharge Physician: WADE Dyer CNP     Discharge Diagnoses/Hospital course    Initial HPI: \"59 y.o. male who presented to [facility] with shortness of breath; patient has a past medical history of end-stage renal disease on hemodialysis, obstructive hypertrophic cardiomyopathy, hypertension, diabetes, hyperlipidemia also cocaine and alcohol use; he states he has not missed any dialysis and had his dialysis treatment on Saturday, he states after the dialysis treatment he felt short of breath and has since progressed, he also relates to feeling lightheaded and dizzy, he states he has had a cough for the last 3 weeks, does complain of chest pain with coughing, denies nausea, he is on 3 L of oxygen around-the-clock at home, he states he smokes 3 to 4 cigarettes a day.     In the ER, he had a creatinine of 8.8, BUN at 78 and potassium 6.3, BNP noted to be 37,826; he has a chronic macrocytic anemia; nephrology was consulted from the ER and planning HD today, patient was treated with calcium gluconate and albuterol for hyperkalemia, he is being admitted to hospital service for further care and evaluation.\"       ESRD on HD:  Had dialysis managed per nephrology        Hyperkalemia: Nephrology following. S/p fistulogram with angioplasty 3/1 for persistent hyperkalemia. Dialysis with potassium bath. Lokelma 10g daily added.   K still elevated today (3/7). Should correct with dialysis. Treated with Lokelma daily per Nephrology hyperkalemia resolved     Hypertensive urgency:  Noted on arrival d/t missed dialysis. Treated with Hydralazine PRN. Home medications Catapres, hydralazine, Imdur,

## 2024-03-10 ENCOUNTER — HOSPITAL ENCOUNTER (EMERGENCY)
Age: 59
Discharge: HOME | End: 2024-03-10
Attending: EMERGENCY MEDICINE
Payer: MEDICARE

## 2024-03-10 VITALS
HEIGHT: 75 IN | BODY MASS INDEX: 25.74 KG/M2 | TEMPERATURE: 97.2 F | HEART RATE: 102 BPM | RESPIRATION RATE: 18 BRPM | SYSTOLIC BLOOD PRESSURE: 121 MMHG | DIASTOLIC BLOOD PRESSURE: 91 MMHG | OXYGEN SATURATION: 90 % | WEIGHT: 207 LBS

## 2024-03-10 DIAGNOSIS — Z99.2 ESRD ON DIALYSIS (HCC): ICD-10-CM

## 2024-03-10 DIAGNOSIS — N63.0 BREAST MASS IN MALE: Primary | ICD-10-CM

## 2024-03-10 DIAGNOSIS — N18.6 ESRD ON DIALYSIS (HCC): ICD-10-CM

## 2024-03-10 PROCEDURE — 99283 EMERGENCY DEPT VISIT LOW MDM: CPT

## 2024-03-10 NOTE — ED PROVIDER NOTES
Right 1990    Surgery on Achilles tendon       CURRENT MEDICATIONS       Discharge Medication List as of 3/10/2024  5:49 PM        CONTINUE these medications which have NOT CHANGED    Details   doxazosin (CARDURA) 4 MG tablet Take 1 tablet by mouth nightly, Disp-30 tablet, R-3Normal      hydrALAZINE (APRESOLINE) 100 MG tablet Take 1 tablet by mouth every 8 hours, Disp-90 tablet, R-3Normal      cinacalcet (SENSIPAR) 30 MG tablet Take 5 tablets by mouth three times a week, Disp-30 tablet, R-3Normal      amLODIPine (NORVASC) 10 MG tablet Take 0.5 tablets by mouth dailyHistorical Med      cetirizine (ZYRTEC) 10 MG tablet Take 1 tablet by mouth dailyHistorical Med      fluticasone (FLONASE) 50 MCG/ACT nasal spray 1 spray by Each Nostril route dailyHistorical Med      albuterol sulfate HFA (VENTOLIN HFA) 108 (90 Base) MCG/ACT inhaler Inhale 2 puffs into the lungs every 6 hours as needed for WheezingHistorical Med      naloxone 4 MG/0.1ML LIQD nasal spray 1 spray by Nasal route as needed for Opioid ReversalHistorical Med      ferrous gluconate (FERGON) 324 (38 Fe) MG tablet Take 1 tablet by mouth 3 times daily (with meals)Historical Med      isosorbide mononitrate (IMDUR) 120 MG extended release tablet Take 1 tablet by mouth in the morning and at bedtime, Disp-30 tablet, R-3Normal      atorvastatin (LIPITOR) 40 MG tablet Take 1 tablet by mouth nightly, Disp-30 tablet, R-3Normal      cloNIDine (CATAPRES) 0.3 MG tablet Take 1 tablet by mouth 3 times daily, Disp-60 tablet, R-3Normal      verapamil (CALAN SR) 240 MG extended release tablet Take 1 tablet by mouth daily, Disp-30 tablet, R-3Normal      folic acid (FOLVITE) 1 MG tablet Take 1 tablet by mouth daily, Disp-30 tablet, R-3Normal      calcitRIOL (ROCALTROL) 0.5 MCG capsule Take 1 capsule by mouth three times a week, Disp-30 capsule, R-3Normal      Sucroferric Oxyhydroxide (VELPHORO) 500 MG CHEW Take 1 tablet by mouth 3 times daily Take with meals and snacksHistorical

## 2024-03-10 NOTE — ED TRIAGE NOTES
Patient to ED for complaint of lump to the right breast that he first noticed last night.  Patient states that it is not painful.  Patient denies further symptoms.

## 2024-03-18 ENCOUNTER — HOSPITAL ENCOUNTER (OUTPATIENT)
Dept: WOMENS IMAGING | Age: 59
Discharge: HOME | End: 2024-03-18
Attending: EMERGENCY MEDICINE
Payer: MEDICARE

## 2024-03-18 DIAGNOSIS — N63.0 BREAST MASS IN MALE: ICD-10-CM

## 2024-03-18 DIAGNOSIS — N63.14 MASS OF LOWER INNER QUADRANT OF RIGHT BREAST: ICD-10-CM

## 2024-03-18 DIAGNOSIS — N61.1 ABSCESS OF BREAST, RIGHT: ICD-10-CM

## 2024-03-18 PROCEDURE — 19083 BX BREAST 1ST LESION US IMAG: CPT

## 2024-03-18 PROCEDURE — 88342 IMHCHEM/IMCYTCHM 1ST ANTB: CPT

## 2024-03-18 PROCEDURE — 88305 TISSUE EXAM BY PATHOLOGIST: CPT

## 2024-03-18 PROCEDURE — 76642 ULTRASOUND BREAST LIMITED: CPT

## 2024-03-18 PROCEDURE — 87070 CULTURE OTHR SPECIMN AEROBIC: CPT

## 2024-03-18 PROCEDURE — 87075 CULTR BACTERIA EXCEPT BLOOD: CPT

## 2024-03-18 PROCEDURE — 87205 SMEAR GRAM STAIN: CPT

## 2024-03-18 PROCEDURE — C1894 INTRO/SHEATH, NON-LASER: HCPCS

## 2024-03-18 PROCEDURE — G0279 TOMOSYNTHESIS, MAMMO: HCPCS

## 2024-03-18 NOTE — PROGRESS NOTES
Breast Biopsy Flowsheet/Post-Operative Care    Date of Procedure: 3/18/2024  Physician: Dr. Lee  Technologist: Jesus Juan    Biopsy:ultrasound guided breast biopsy  Lesion type: Palpable  Breast: right    Clock face position: Site #1: inferior to the nipple          Primary Method of Detection: Palpation      Microcalcification's: no   Distribution: N/A    Asymmetry: asymmetric    Biopsy Method:   Sertera:    Site # 1    Gauge: 14    # of Passes: 4   (one of these was sent for cultures on a Tefla pad)    Clip: Christi       Pre-Op Assessment: (BI-RADS)   4. Suspicious Abnormality    Patient Tolerated Procedure: good  Complications: none  Comments: Trav was originally thought to have a hematoma or abscess in this area.  We tried aspirating with an 18 gauge and a 16 gauge needle, we also tried with a 13 ga. Coaxial biopsy needle, since it would not aspirate, we proceeded with biopsying it.  Cores came out looking like dark old blood.  One core was sent for cultures since he was experiencing tenderness in the breast    Post Operative Care  Steri strips: Yes  Dressing: Gauze, Tape   Ice Applied to Site:  Yes  Evidence of Bleeding:  No    Pain Verbalized: No      Written Discharge Instructions: Yes  Condition at Discharge: good  Time of Discharge: 1145    Electronically signed by Dalila Horne on 3/18/2024 at 11:39 AM

## 2024-03-19 ENCOUNTER — CLINICAL DOCUMENTATION (OUTPATIENT)
Dept: WOMENS IMAGING | Age: 59
End: 2024-03-19

## 2024-03-19 NOTE — PROGRESS NOTES
Contact Type :    Telephone  Notes :  After consulting with Dr. Lee,  Trav was contacted by telephone.  He was informed of the negative biopsy results and the need to return for a 6 month follow up.  He voiced understanding.    Biopsy site: no issues stated     Results faxed to: Radha Hou and Dr. Campos

## 2024-03-23 LAB
BACTERIA SPEC AEROBE CULT: NORMAL
BACTERIA SPEC ANAEROBE CULT: NORMAL
GRAM STN SPEC: NORMAL

## 2024-04-02 ENCOUNTER — HOSPITAL ENCOUNTER (INPATIENT)
Age: 59
LOS: 3 days | Discharge: HOME | End: 2024-04-05
Attending: STUDENT IN AN ORGANIZED HEALTH CARE EDUCATION/TRAINING PROGRAM
Payer: MEDICARE

## 2024-04-02 ENCOUNTER — APPOINTMENT (OUTPATIENT)
Dept: GENERAL RADIOLOGY | Age: 59
End: 2024-04-02
Payer: MEDICARE

## 2024-04-02 DIAGNOSIS — N63.14 MASS OF LOWER INNER QUADRANT OF RIGHT BREAST: ICD-10-CM

## 2024-04-02 DIAGNOSIS — N18.6 ESRD (END STAGE RENAL DISEASE) ON DIALYSIS (HCC): ICD-10-CM

## 2024-04-02 DIAGNOSIS — Z99.2 ESRD (END STAGE RENAL DISEASE) ON DIALYSIS (HCC): ICD-10-CM

## 2024-04-02 DIAGNOSIS — J96.21 ACUTE ON CHRONIC RESPIRATORY FAILURE WITH HYPOXIA (HCC): Primary | ICD-10-CM

## 2024-04-02 DIAGNOSIS — Z09 FOLLOW-UP EXAM: Primary | ICD-10-CM

## 2024-04-02 DIAGNOSIS — I16.1 HYPERTENSIVE EMERGENCY: ICD-10-CM

## 2024-04-02 PROBLEM — R06.02 SHORTNESS OF BREATH: Status: ACTIVE | Noted: 2024-04-02

## 2024-04-02 LAB
ALBUMIN SERPL BCG-MCNC: 4.1 G/DL (ref 3.5–5.1)
ALP SERPL-CCNC: 138 U/L (ref 38–126)
ALT SERPL W/O P-5'-P-CCNC: 13 U/L (ref 11–66)
ANION GAP SERPL CALC-SCNC: 21 MEQ/L (ref 8–16)
AST SERPL-CCNC: 18 U/L (ref 5–40)
BASOPHILS ABSOLUTE: 0 THOU/MM3 (ref 0–0.1)
BASOPHILS NFR BLD AUTO: 0.4 %
BILIRUB SERPL-MCNC: 0.4 MG/DL (ref 0.3–1.2)
BUN SERPL-MCNC: 93 MG/DL (ref 7–22)
CA-I BLD ISE-SCNC: 1.03 MMOL/L (ref 1.12–1.32)
CALCIUM SERPL-MCNC: 8.3 MG/DL (ref 8.5–10.5)
CHLORIDE SERPL-SCNC: 97 MEQ/L (ref 98–111)
CO2 SERPL-SCNC: 20 MEQ/L (ref 23–33)
CREAT SERPL-MCNC: 10.6 MG/DL (ref 0.4–1.2)
DEPRECATED RDW RBC AUTO: 55.6 FL (ref 35–45)
EOSINOPHIL NFR BLD AUTO: 0.4 %
EOSINOPHILS ABSOLUTE: 0 THOU/MM3 (ref 0–0.4)
ERYTHROCYTE [DISTWIDTH] IN BLOOD BY AUTOMATED COUNT: 14.1 % (ref 11.5–14.5)
ETHANOL SERPL-MCNC: < 0.01 %
GFR SERPL CREATININE-BSD FRML MDRD: 5 ML/MIN/1.73M2
GLUCOSE SERPL-MCNC: 135 MG/DL (ref 70–108)
HCT VFR BLD AUTO: 34.4 % (ref 42–52)
HGB BLD-MCNC: 11 GM/DL (ref 14–18)
IMM GRANULOCYTES # BLD AUTO: 0.02 THOU/MM3 (ref 0–0.07)
IMM GRANULOCYTES NFR BLD AUTO: 0.2 %
LYMPHOCYTES ABSOLUTE: 0.5 THOU/MM3 (ref 1–4.8)
LYMPHOCYTES NFR BLD AUTO: 5.6 %
MAGNESIUM SERPL-MCNC: 2.6 MG/DL (ref 1.6–2.4)
MCH RBC QN AUTO: 34 PG (ref 26–33)
MCHC RBC AUTO-ENTMCNC: 32 GM/DL (ref 32.2–35.5)
MCV RBC AUTO: 106.2 FL (ref 80–94)
MONOCYTES ABSOLUTE: 0.4 THOU/MM3 (ref 0.4–1.3)
MONOCYTES NFR BLD AUTO: 5.4 %
NEUTROPHILS NFR BLD AUTO: 88 %
NRBC BLD AUTO-RTO: 0 /100 WBC
NT-PROBNP SERPL IA-MCNC: ABNORMAL PG/ML (ref 0–124)
OSMOLALITY SERPL CALC.SUM OF ELEC: 306.4 MOSMOL/KG (ref 275–300)
PHOSPHATE SERPL-MCNC: 6 MG/DL (ref 2.4–4.7)
PLATELET # BLD AUTO: 141 THOU/MM3 (ref 130–400)
PMV BLD AUTO: 9.8 FL (ref 9.4–12.4)
POTASSIUM SERPL-SCNC: 5.9 MEQ/L (ref 3.5–5.2)
PROT SERPL-MCNC: 7.7 G/DL (ref 6.1–8)
RBC # BLD AUTO: 3.24 MILL/MM3 (ref 4.7–6.1)
SEGMENTED NEUTROPHILS ABSOLUTE COUNT: 7.3 THOU/MM3 (ref 1.8–7.7)
SODIUM SERPL-SCNC: 138 MEQ/L (ref 135–145)
TROPONIN, HIGH SENSITIVITY: 107 NG/L (ref 0–12)
WBC # BLD AUTO: 8.3 THOU/MM3 (ref 4.8–10.8)

## 2024-04-02 PROCEDURE — 83735 ASSAY OF MAGNESIUM: CPT

## 2024-04-02 PROCEDURE — 96365 THER/PROPH/DIAG IV INF INIT: CPT

## 2024-04-02 PROCEDURE — 90935 HEMODIALYSIS ONE EVALUATION: CPT

## 2024-04-02 PROCEDURE — 99285 EMERGENCY DEPT VISIT HI MDM: CPT

## 2024-04-02 PROCEDURE — 82330 ASSAY OF CALCIUM: CPT

## 2024-04-02 PROCEDURE — 6370000000 HC RX 637 (ALT 250 FOR IP): Performed by: INTERNAL MEDICINE

## 2024-04-02 PROCEDURE — 82077 ASSAY SPEC XCP UR&BREATH IA: CPT

## 2024-04-02 PROCEDURE — 71046 X-RAY EXAM CHEST 2 VIEWS: CPT

## 2024-04-02 PROCEDURE — 2060000000 HC ICU INTERMEDIATE R&B

## 2024-04-02 PROCEDURE — 6370000000 HC RX 637 (ALT 250 FOR IP)

## 2024-04-02 PROCEDURE — 93005 ELECTROCARDIOGRAM TRACING: CPT

## 2024-04-02 PROCEDURE — 99223 1ST HOSP IP/OBS HIGH 75: CPT | Performed by: INTERNAL MEDICINE

## 2024-04-02 PROCEDURE — 80053 COMPREHEN METABOLIC PANEL: CPT

## 2024-04-02 PROCEDURE — 6360000002 HC RX W HCPCS: Performed by: STUDENT IN AN ORGANIZED HEALTH CARE EDUCATION/TRAINING PROGRAM

## 2024-04-02 PROCEDURE — 6370000000 HC RX 637 (ALT 250 FOR IP): Performed by: STUDENT IN AN ORGANIZED HEALTH CARE EDUCATION/TRAINING PROGRAM

## 2024-04-02 PROCEDURE — 85025 COMPLETE CBC W/AUTO DIFF WBC: CPT

## 2024-04-02 PROCEDURE — 83880 ASSAY OF NATRIURETIC PEPTIDE: CPT

## 2024-04-02 PROCEDURE — 84484 ASSAY OF TROPONIN QUANT: CPT

## 2024-04-02 PROCEDURE — 36415 COLL VENOUS BLD VENIPUNCTURE: CPT

## 2024-04-02 PROCEDURE — 90935 HEMODIALYSIS ONE EVALUATION: CPT | Performed by: INTERNAL MEDICINE

## 2024-04-02 PROCEDURE — 2580000003 HC RX 258: Performed by: STUDENT IN AN ORGANIZED HEALTH CARE EDUCATION/TRAINING PROGRAM

## 2024-04-02 PROCEDURE — 99222 1ST HOSP IP/OBS MODERATE 55: CPT | Performed by: INTERNAL MEDICINE

## 2024-04-02 PROCEDURE — 84100 ASSAY OF PHOSPHORUS: CPT

## 2024-04-02 PROCEDURE — 93005 ELECTROCARDIOGRAM TRACING: CPT | Performed by: STUDENT IN AN ORGANIZED HEALTH CARE EDUCATION/TRAINING PROGRAM

## 2024-04-02 PROCEDURE — 96366 THER/PROPH/DIAG IV INF ADDON: CPT

## 2024-04-02 RX ORDER — NITROGLYCERIN 20 MG/100ML
5-200 INJECTION INTRAVENOUS CONTINUOUS
Status: DISCONTINUED | OUTPATIENT
Start: 2024-04-02 | End: 2024-04-05 | Stop reason: HOSPADM

## 2024-04-02 RX ORDER — ASPIRIN 81 MG/1
324 TABLET, CHEWABLE ORAL ONCE
Status: COMPLETED | OUTPATIENT
Start: 2024-04-02 | End: 2024-04-02

## 2024-04-02 RX ORDER — CALCITRIOL 0.25 UG/1
0.5 CAPSULE, LIQUID FILLED ORAL
Status: DISCONTINUED | OUTPATIENT
Start: 2024-04-03 | End: 2024-04-05 | Stop reason: HOSPADM

## 2024-04-02 RX ORDER — BENZONATATE 100 MG/1
100 CAPSULE ORAL 3 TIMES DAILY PRN
Status: DISCONTINUED | OUTPATIENT
Start: 2024-04-02 | End: 2024-04-05 | Stop reason: HOSPADM

## 2024-04-02 RX ORDER — ACETAMINOPHEN 325 MG/1
650 TABLET ORAL EVERY 6 HOURS PRN
Status: DISCONTINUED | OUTPATIENT
Start: 2024-04-02 | End: 2024-04-05 | Stop reason: HOSPADM

## 2024-04-02 RX ORDER — SERTRALINE HYDROCHLORIDE 100 MG/1
100 TABLET, FILM COATED ORAL DAILY
Status: DISCONTINUED | OUTPATIENT
Start: 2024-04-02 | End: 2024-04-05 | Stop reason: HOSPADM

## 2024-04-02 RX ORDER — CINACALCET 30 MG/1
150 TABLET, FILM COATED ORAL
Status: DISCONTINUED | OUTPATIENT
Start: 2024-04-03 | End: 2024-04-04

## 2024-04-02 RX ORDER — ONDANSETRON 4 MG/1
4 TABLET, ORALLY DISINTEGRATING ORAL EVERY 8 HOURS PRN
Status: DISCONTINUED | OUTPATIENT
Start: 2024-04-02 | End: 2024-04-05 | Stop reason: HOSPADM

## 2024-04-02 RX ORDER — ONDANSETRON 2 MG/ML
4 INJECTION INTRAMUSCULAR; INTRAVENOUS EVERY 6 HOURS PRN
Status: DISCONTINUED | OUTPATIENT
Start: 2024-04-02 | End: 2024-04-05 | Stop reason: HOSPADM

## 2024-04-02 RX ORDER — SODIUM CHLORIDE 0.9 % (FLUSH) 0.9 %
5-40 SYRINGE (ML) INJECTION EVERY 12 HOURS SCHEDULED
Status: DISCONTINUED | OUTPATIENT
Start: 2024-04-02 | End: 2024-04-05 | Stop reason: HOSPADM

## 2024-04-02 RX ORDER — GUAIFENESIN 600 MG/1
600 TABLET, EXTENDED RELEASE ORAL 2 TIMES DAILY
Status: DISCONTINUED | OUTPATIENT
Start: 2024-04-02 | End: 2024-04-05 | Stop reason: HOSPADM

## 2024-04-02 RX ORDER — HYDRALAZINE HYDROCHLORIDE 50 MG/1
100 TABLET, FILM COATED ORAL EVERY 8 HOURS SCHEDULED
Status: DISCONTINUED | OUTPATIENT
Start: 2024-04-02 | End: 2024-04-05 | Stop reason: HOSPADM

## 2024-04-02 RX ORDER — DOXAZOSIN MESYLATE 4 MG/1
4 TABLET ORAL 2 TIMES DAILY
Status: DISCONTINUED | OUTPATIENT
Start: 2024-04-02 | End: 2024-04-05 | Stop reason: HOSPADM

## 2024-04-02 RX ORDER — FOLIC ACID 1 MG/1
1 TABLET ORAL DAILY
Status: DISCONTINUED | OUTPATIENT
Start: 2024-04-02 | End: 2024-04-05 | Stop reason: HOSPADM

## 2024-04-02 RX ORDER — LABETALOL HYDROCHLORIDE 5 MG/ML
5 INJECTION, SOLUTION INTRAVENOUS EVERY 6 HOURS PRN
Status: ACTIVE | OUTPATIENT
Start: 2024-04-02 | End: 2024-04-03

## 2024-04-02 RX ORDER — AMLODIPINE BESYLATE 10 MG/1
10 TABLET ORAL DAILY
Status: DISCONTINUED | OUTPATIENT
Start: 2024-04-02 | End: 2024-04-05 | Stop reason: HOSPADM

## 2024-04-02 RX ORDER — SODIUM CHLORIDE 9 MG/ML
INJECTION, SOLUTION INTRAVENOUS PRN
Status: DISCONTINUED | OUTPATIENT
Start: 2024-04-02 | End: 2024-04-05 | Stop reason: HOSPADM

## 2024-04-02 RX ORDER — ACETAMINOPHEN 650 MG/1
650 SUPPOSITORY RECTAL EVERY 6 HOURS PRN
Status: DISCONTINUED | OUTPATIENT
Start: 2024-04-02 | End: 2024-04-05 | Stop reason: HOSPADM

## 2024-04-02 RX ORDER — ATORVASTATIN CALCIUM 40 MG/1
40 TABLET, FILM COATED ORAL NIGHTLY
Status: DISCONTINUED | OUTPATIENT
Start: 2024-04-02 | End: 2024-04-05 | Stop reason: HOSPADM

## 2024-04-02 RX ORDER — GUAIFENESIN/DEXTROMETHORPHAN 100-10MG/5
5 SYRUP ORAL EVERY 4 HOURS PRN
Status: DISCONTINUED | OUTPATIENT
Start: 2024-04-02 | End: 2024-04-05 | Stop reason: HOSPADM

## 2024-04-02 RX ORDER — POLYETHYLENE GLYCOL 3350 17 G/17G
17 POWDER, FOR SOLUTION ORAL DAILY PRN
Status: DISCONTINUED | OUTPATIENT
Start: 2024-04-02 | End: 2024-04-05 | Stop reason: HOSPADM

## 2024-04-02 RX ORDER — MAGNESIUM SULFATE IN WATER 40 MG/ML
2000 INJECTION, SOLUTION INTRAVENOUS PRN
Status: DISCONTINUED | OUTPATIENT
Start: 2024-04-02 | End: 2024-04-02

## 2024-04-02 RX ORDER — ASPIRIN 81 MG/1
81 TABLET ORAL DAILY
Status: DISCONTINUED | OUTPATIENT
Start: 2024-04-02 | End: 2024-04-05 | Stop reason: HOSPADM

## 2024-04-02 RX ORDER — ALBUTEROL SULFATE 90 UG/1
2 AEROSOL, METERED RESPIRATORY (INHALATION) EVERY 6 HOURS PRN
Status: DISCONTINUED | OUTPATIENT
Start: 2024-04-02 | End: 2024-04-05 | Stop reason: HOSPADM

## 2024-04-02 RX ORDER — VITAMIN B COMPLEX
1000 TABLET ORAL DAILY
Status: DISCONTINUED | OUTPATIENT
Start: 2024-04-02 | End: 2024-04-05 | Stop reason: HOSPADM

## 2024-04-02 RX ORDER — FAMOTIDINE 20 MG/1
20 TABLET, FILM COATED ORAL DAILY
Status: DISCONTINUED | OUTPATIENT
Start: 2024-04-02 | End: 2024-04-05 | Stop reason: HOSPADM

## 2024-04-02 RX ORDER — SODIUM CHLORIDE 0.9 % (FLUSH) 0.9 %
10 SYRINGE (ML) INJECTION PRN
Status: DISCONTINUED | OUTPATIENT
Start: 2024-04-02 | End: 2024-04-05 | Stop reason: HOSPADM

## 2024-04-02 RX ORDER — MULTIVITAMIN WITH IRON
1 TABLET ORAL DAILY
Status: DISCONTINUED | OUTPATIENT
Start: 2024-04-02 | End: 2024-04-05 | Stop reason: HOSPADM

## 2024-04-02 RX ORDER — QUINIDINE GLUCONATE 324 MG
240 TABLET, EXTENDED RELEASE ORAL
Status: DISCONTINUED | OUTPATIENT
Start: 2024-04-02 | End: 2024-04-05 | Stop reason: HOSPADM

## 2024-04-02 RX ADMIN — GUAIFENESIN 600 MG: 600 TABLET, EXTENDED RELEASE ORAL at 22:16

## 2024-04-02 RX ADMIN — ASPIRIN 324 MG: 81 TABLET, CHEWABLE ORAL at 04:39

## 2024-04-02 RX ADMIN — AMLODIPINE BESYLATE 10 MG: 10 TABLET ORAL at 18:24

## 2024-04-02 RX ADMIN — FAMOTIDINE 20 MG: 20 TABLET, FILM COATED ORAL at 18:23

## 2024-04-02 RX ADMIN — BENZONATATE 100 MG: 100 CAPSULE ORAL at 22:16

## 2024-04-02 RX ADMIN — DOXAZOSIN 4 MG: 4 TABLET ORAL at 18:24

## 2024-04-02 RX ADMIN — Medication 1000 UNITS: at 18:24

## 2024-04-02 RX ADMIN — CLONIDINE HYDROCHLORIDE 0.3 MG: 0.2 TABLET ORAL at 18:24

## 2024-04-02 RX ADMIN — Medication 240 MG: at 18:24

## 2024-04-02 RX ADMIN — ASPIRIN 81 MG: 81 TABLET, COATED ORAL at 18:24

## 2024-04-02 RX ADMIN — SERTRALINE 100 MG: 100 TABLET, FILM COATED ORAL at 18:24

## 2024-04-02 RX ADMIN — SODIUM CHLORIDE, PRESERVATIVE FREE 10 ML: 5 INJECTION INTRAVENOUS at 21:07

## 2024-04-02 RX ADMIN — Medication 1 TABLET: at 18:24

## 2024-04-02 RX ADMIN — FOLIC ACID 1 MG: 1 TABLET ORAL at 18:24

## 2024-04-02 RX ADMIN — CLONIDINE HYDROCHLORIDE 0.3 MG: 0.2 TABLET ORAL at 21:06

## 2024-04-02 RX ADMIN — NITROGLYCERIN 5 MCG/MIN: 20 INJECTION INTRAVENOUS at 04:38

## 2024-04-02 RX ADMIN — GUAIFENESIN AND DEXTROMETHORPHAN 5 ML: 100; 10 SYRUP ORAL at 21:06

## 2024-04-02 RX ADMIN — ATORVASTATIN CALCIUM 40 MG: 40 TABLET, FILM COATED ORAL at 21:06

## 2024-04-02 RX ADMIN — HYDRALAZINE HYDROCHLORIDE 100 MG: 50 TABLET ORAL at 21:06

## 2024-04-02 ASSESSMENT — PAIN SCALES - GENERAL
PAINLEVEL_OUTOF10: 0
PAINLEVEL_OUTOF10: 0

## 2024-04-02 NOTE — ED NOTES
ED to inpatient nurses report      Chief Complaint:  Chief Complaint   Patient presents with    Shortness of Breath     Present to ED from: home    MOA:     LOC: alert and orientated to name, place, date  Mobility: Requires assistance * 1  Oxygen Baseline: none    Current needs required: none     Code Status:   Prior    What abnormal results were found and what did you give/do to treat them? See labs.  Any procedures or intervention occur? See MAR.    Mental Status:  Level of Consciousness: Alert (0)    Psych Assessment:        Vitals:  Patient Vitals for the past 24 hrs:   BP Temp Temp src Pulse Resp SpO2 Height   04/02/24 0601 (!) 189/97 -- -- 93 17 92 % --   04/02/24 0541 (!) 198/97 -- -- 94 18 92 % --   04/02/24 0510 (!) 208/107 -- -- 99 25 98 % --   04/02/24 0451 (!) 206/102 -- -- 99 20 98 % --   04/02/24 0441 (!) 223/102 -- -- (!) 102 25 95 % --   04/02/24 0431 (!) 209/102 -- -- (!) 101 21 94 % --   04/02/24 0425 (!) 222/113 98 °F (36.7 °C) -- -- -- -- --   04/02/24 0421 -- -- Oral (!) 106 22 97 % 1.905 m (6' 3\")        LDAs:   Peripheral IV 04/02/24 Right Antecubital (Active)   Site Assessment Clean, dry & intact 04/02/24 0432   Line Status Blood return noted;Flushed 04/02/24 0432       Ambulatory Status:  No data recorded    Diagnosis:  DISPOSITION Decision To Admit 04/02/2024 05:57:16 AM   Final diagnoses:   Acute on chronic respiratory failure with hypoxia (HCC)        Consults:  None     Pain Score:       C-SSRS:   Risk of Suicide: No Risk    Sepsis Screening:  Sepsis Risk Score: 7.05    Fingal Fall Risk:       Swallow Screening        Preferred Language:   English      ALLERGIES     Humalog [insulin lispro], Insulin regular human, and Lisinopril    SURGICAL HISTORY       Past Surgical History:   Procedure Laterality Date    ABDOMINAL AORTIC ANEURYSM REPAIR      BRONCHOSCOPY N/A 10/6/2022    BRONCHOSCOPY performed by Theodore Ng MD at [facility]    DIALYSIS FISTULA CREATION Left 07/08/2016    EKG

## 2024-04-02 NOTE — ED NOTES
Pt transported to [facility] on cart in stable condition. Floor contacted before transport.

## 2024-04-02 NOTE — FLOWSHEET NOTE
4 hour treatment completed. 4L of fluid removed. Patient tolerated fluid removal well. Pressure held to each needle site for 10 mins x2. Dressing clean/dry and intact upon leaving the unit. Report given to primary RN. Charting printed and placed in bin to be scanned into EMR.   04/02/24 0740 04/02/24 1201   Vital Signs   BP (!) 211/114 (!) 174/92   Temp 99 °F (37.2 °C) 98.7 °F (37.1 °C)   Pulse 87 90   Respirations 20 18   SpO2 95 % 96 %   Weight - Scale 97.6 kg (215 lb 2.7 oz) 93.6 kg (206 lb 5.6 oz)   Weight Method Bed scale Bed scale   Percent Weight Change  --  -4.1   Post-Hemodialysis Assessment   Post-Treatment Procedures  --  Blood returned;Access bleeding time < 10 minutes   Machine Disinfection Process  --  Acid/Vinegar Clean;Heat Disinfect;Exterior Machine Disinfection   Blood Volume Processed (Liters)  --  82.9 L   Dialyzer Clearance  --  Lightly streaked   Duration of Treatment (minutes)  --  240 minutes   Heparin Amount Administered During Treatment (mL)  --  0 mL   Hemodialysis Intake (ml)  --  400 ml   Hemodialysis Output (ml)  --  4400 ml   NET Removed (ml)  --  4000   Tolerated Treatment  --  Good

## 2024-04-02 NOTE — H&P
Hospitalist - History & Physical      Patient: Trav Jennings    Unit/Bed:10/010A  YOB: 1965  MRN: [id number]   Acct: [id number]   PCP: Radha Hou APRN - CNP    Date of Service: Pt seen/examined on 04/02/24  and Admitted to Inpatient with expected LOS greater than two midnights due to medical therapy.     Chief Complaint:  SOB     Assessment and Plan:      Acute on Chronic Hypoxic RF / Distress 2/2 FPE: Previously on Oxygen at home, turned in, again d/c on 3LNC prior hospitalization, p/w significant RD and on 6LNC w/SpO2 >90% (not states SpO2 was 74% on arrival), but significant distress. States his home oxygen has broken again, and therefore he has been w/o.   Titrate as needed for goal SpO2 >90% + IS / Acapella + Position pt for max comfort and respiratory dynamics  Will re-evaluate post-HD and determine if additional therapies are needed  HTNe w/Fluid overload / Flash pulmonary edema, 2/2 medication noncompliance ISO ESRD: Underwent HD on Sun as usual, but has not taken Anti-HTN meds (cocaine / EtOH instead) -> due for HD today, but instead came to Hospital. Uncontrolled HTN, Left WHITNEY: Chronic, Home medications extensive. /113 on arrival -> 202/108, currently. Has not taken his home medications for ~2-days.  C/w Nitro gtt + Getting HD per Nephrology  Restarted Hydralazine 100mg TID -> Can resume Home medications sequentially -> continue to add on as needed per Nephro assistance   Home medications include: Cardura 4mg nightly + Norvasc 5mg daily + Imdur 120mg BID + Clonidine 0.3mg TID + Verapamil 240mg ER daily + Hydralazine 100mg TID   ESRD on HD (2/2 HTN / FSGS): Follows w/Dr. Ayon OP; K 5.9, Bicarb 20, BUN 93, Cr 10.6, AG 21, eGFR 5, Phos 6; all out of range for pt's normal standard.   Hyperkalemia w/o EKG changes -> labile due to ESRD, getting HD -> recheck after HD   HAGMA 2/2 ESRD / elevated BUN (accumulation of endogenous OA) -> should improve with HD session  Monitor w/routine  by mouth daily      famotidine (PEPCID) 20 MG tablet Take 1 tablet by mouth daily 60 tablet 0    sertraline (ZOLOFT) 100 MG tablet Take 1 tablet by mouth daily      aspirin 81 MG EC tablet Take 1 tablet by mouth daily 7 tablet 0    traZODone (DESYREL) 50 MG tablet Take 1 tablet by mouth nightly as needed for Sleep 7 tablet 0       Allergies:    Humalog [insulin lispro], Insulin regular human, and Lisinopril    Social History:  reports that he has been smoking cigarettes. He started smoking about 38 years ago. He has a 9.6 pack-year smoking history. He has never used smokeless tobacco. He reports current alcohol use of about 20.0 standard drinks of alcohol per week. He reports current drug use. Drug: Cocaine.      Family History: family history includes Cancer in his mother; Other in his brother.     Diet:  No diet orders on file    Review of systems:   Pertinent positives as noted in the HPI. All other systems reviewed and negative.    PHYSICAL EXAM:   height is 1.905 m (6' 3\"). His temperature is 98 °F (36.7 °C). His blood pressure is 206/97 (abnormal) and his pulse is 90. His respiration is 26 and oxygen saturation is 95%.    Body mass index is 25.87 kg/m².     GENERAL APPEARANCE: Well developed, well nourished, alert & cooperative, NAD  EYES: EOMI, no conjunctivitis, no erythema, no discharge.   HENT: normocephalic head, hearing grossly intact, no nasal discharge, oral cavity & pharynx free of inflammation, swelling, exudate, or lesions. Neck supple, non-tender without lymphadenopathy, masses or thyromegaly.  CARDIAC: RRR, normal S1 and S2. No S3, S4, no m/r/g, no peripheral edema, cyanosis or pallor. Extremities warm & well perfused. Capillary refill < 2 seconds. No carotid bruits.  LUNGS: No rales, rhonchi, wheezing; non-pursing lips, no cyanosis, diminished lung sounds at bases  ABDOMEN: NABS, soft, nondistended, nontender, without  guarding or rebound, no masses, organomegaly noted   MUSKULOSKELETAL: No

## 2024-04-02 NOTE — ED NOTES
Pt back from dialysis at this time. Pt resting in cot with respirations even and unlabored. No concerns or needs voiced. Call light is within reach.

## 2024-04-02 NOTE — PROGRESS NOTES
Patient was seen and examined during hemodialysis.    systolic. On Nitro drip.   UF 4 liters.   Full consult to follow.

## 2024-04-02 NOTE — ED NOTES
Upon first contact pt sitting up in bed. This RN spoke with dialysis. Pt made aware of transport to dialysis this am. Verbalized understanding. Will monitor

## 2024-04-02 NOTE — PROGRESS NOTES
PHARMACY NOTE:    The electrolyte replacement protocol for magnesium has been discontinued per P&T guidelines because the patient has reduced renal function (CrCl < 30 mL/min).      The patient's most recent potassium & magnesium levels are:  Recent Labs     04/02/24  0420   K 5.9*   MG 2.6*     Estimated Creatinine Clearance: 9 mL/min (A) (based on SCr of 10.6 mg/dL (HH)).    For patients with decreased renal function (below 30ml/min) needing potassium/magnesium supplementation, please order individual bolus doses with appropriate monitoring.      Please contact the inpatient pharmacy with any concerns.  Thank you.  Juan Francisco Bryant [facility]  4/2/2024 3:38 PM

## 2024-04-02 NOTE — ED PROVIDER NOTES
Hemodialysis patient (HCC) (10/17/2016), Hemorrhoids (1/16/2012), History of blood transfusion, Hyperlipidemia, Hyperphosphatemia (5/21/2016), Hypertension, Left renal artery stenosis (HCC) (5/22/2014), Monoclonal (M) protein disease, multiple 'M' protein, Nicotine dependence (6/16/2014), Noncompliance, Pneumonia, Psychiatric problem, PTSD (post-traumatic stress disorder), Secondary hyperparathyroidism (of renal origin), and Sleep apnea.    Trav  has a past surgical history that includes Leg Tendon Surgery; EKG 12 Lead (9/24/2015); Dialysis fistula creation (Left, 07/08/2016); vascular surgery (Left, 07/08/2016); vascular surgery (Right, 1990); Hand tendon surgery (Left, 4/5/2019); Abdominal aortic aneurysm repair; bronchoscopy (N/A, 10/6/2022); thoracotomy (Right, 10/10/2022); and BRIAN US GUIDED BREAST BIOPSY W LOC DEVICE 1ST LESION RIGHT (Right, 3/18/2024).    CURRENT MEDICATIONS   Trav has a current medication list which includes the following long-term medication(s): doxazosin, hydralazine, cinacalcet, ferrous gluconate, isosorbide mononitrate, atorvastatin, clonidine, verapamil, folic acid, calcitriol, vitamin e, b complex-c-zn-folic acid, famotidine, aspirin, and trazodone.    ALLERGIES   Trav is allergic to humalog [insulin lispro], insulin regular human, and lisinopril.    FAMILY HISTORY   Trav family history includes Cancer in his mother; Other in his brother.    SOCIAL HISTORY   Trav  reports that he has been smoking cigarettes. He started smoking about 38 years ago. He has a 9.6 pack-year smoking history. He has never used smokeless tobacco. He reports current alcohol use of about 20.0 standard drinks of alcohol per week. He reports current drug use. Drug: Cocaine.    PHYSICAL EXAM     ED Triage Vitals   BP Temp Temp src Pulse Resp SpO2 Height Weight   -- -- -- -- -- -- -- --       Initial vital signs and nursing assessment reviewed and abnormal from elevated blood, tachycardia pressure . Body mass index is  interpretation performed.  ECG/medicine tests: ordered and independent interpretation performed.  Discussion of management or test interpretation with external provider(s): Nephrology, hospitalist service    Risk  OTC drugs.  Prescription drug management.  Decision regarding hospitalization.  Diagnosis or treatment significantly limited by social determinants of health.      ED COURSE   ED Medications administered this visit (None if left blank):   Medications   nitroGLYCERIN 200 mcg/mL in dextrose 5% (20 mcg/min IntraVENous Rate/Dose Change 4/2/24 0637)   aspirin chewable tablet 324 mg (324 mg Oral Given 4/2/24 0439)       ED Course as of 04/02/24 0643   Tue Apr 02, 2024   0530 Potassium(!): 5.9 [SC]   0530 Creatinine(!!): 10.6 [SC]   0557 Referral for admission sent to DEMETRICE Grimaldo (Hospitalist Service) via Perfect [employer] Text Message.   [SC]   0558 Troponin, High Sensitivity(!): 107 [SC]   0558 Pro-BNP(!): 06711.0 [SC]   0601 Call to Dr. Clayton - Nephrology - will arrange for dialysis around 7am. [SC]      ED Course User Index  [SC] Saeid Nation MD       Procedures: (None if left blank)  Procedures:     Consultants:  IP CONSULT TO NEPHROLOGY    Documentation:  N/A    MEDICATION CHANGES     New Prescriptions    No medications on file       FINAL IMPRESSION     Final diagnoses:   Acute on chronic respiratory failure with hypoxia (HCC)   ESRD (end stage renal disease) on dialysis (HCC)   Hypertensive emergency       DISPOSITION   DISPOSITION Decision To Admit 04/02/2024 05:57:16 AM      Results and plan discussed with patient at bedside. Patient is agreeable to plan.       This transcription was electronically signed. Parts of this transcriptions may have been dictated by use of voice recognition software and electronically transcribed. The transcription may contain errors not detected in proofreading. Please refer to my supervising physician's documentation if my documentation differs.    Electronically

## 2024-04-02 NOTE — CONSULTS
[facility]    [address], [place] [address]  [phone]     Hospital Consult  4/2/2024 9:21 AM    Pt Name:    Trav Jennings  MRN:     [id number]   [id number]  YOB: 1965  Admit Date:    4/2/2024  4:15 AM  Primary Care Physician:  Radha Hou, WADE - CNP        Reason for Consult:  ESRD    History:   The patient is a 59 y.o. male seen in renal consult for ESRD. He dialyzes TTS under the care of Dr. Ayon.  He has a hx of HTN, HLD, REZA, hx substance use, and as below.  He presented to the hospital with shortness of breath and HTN.  He had dialysis on Saturday but did not take his meds since then and has been using cocaine and alcohol and came to the hospital due to shortness of breath.  BP was significantly elevated and he was started on Nitro drip.  He was hypoxic and placed on nasal canula.  CXR showed pulmonary congestion.  Nephrology was consulted for his dialysis management.  K was 5.9, BUN 93.    Past Medical History:  Past Medical History:   Diagnosis Date    AAA (abdominal aortic aneurysm) ([facility])     NURIS (acute kidney injury) ([facility]) 9/24/2015    Anemia associated with chronic renal failure     Arthritis     stated in hands    Cocaine abuse ([facility]) 5/10/2014    Depression     Diabetes mellitus ([facility])     pt states he no longer has diabetes he has lost alot of davion.     FSGS (focal segmental glomerulosclerosis) 5/23/2013    Hemodialysis patient ([facility]) 10/17/2016    on hemodialysis with [facility]    Hemorrhoids 1/16/2012    History of blood transfusion     Hyperlipidemia     Hyperphosphatemia 5/21/2016    Hypertension     Left renal artery stenosis ([facility]) 5/22/2014    Monoclonal (M) protein disease, multiple 'M' protein     Nicotine dependence 6/16/2014    Noncompliance     Pneumonia     Psychiatric problem     PTSD (post-traumatic stress disorder)     Pt vet from [place]    Secondary hyperparathyroidism (of renal origin)     Sleep

## 2024-04-03 LAB
ANION GAP SERPL CALC-SCNC: 21 MEQ/L (ref 8–16)
BASOPHILS ABSOLUTE: 0 THOU/MM3 (ref 0–0.1)
BASOPHILS NFR BLD AUTO: 0.6 %
BUN SERPL-MCNC: 69 MG/DL (ref 7–22)
CALCIUM SERPL-MCNC: 7.9 MG/DL (ref 8.5–10.5)
CHLORIDE SERPL-SCNC: 100 MEQ/L (ref 98–111)
CO2 SERPL-SCNC: 18 MEQ/L (ref 23–33)
CREAT SERPL-MCNC: 8.8 MG/DL (ref 0.4–1.2)
DEPRECATED RDW RBC AUTO: 56.1 FL (ref 35–45)
EOSINOPHIL NFR BLD AUTO: 2.5 %
EOSINOPHILS ABSOLUTE: 0.1 THOU/MM3 (ref 0–0.4)
ERYTHROCYTE [DISTWIDTH] IN BLOOD BY AUTOMATED COUNT: 14.1 % (ref 11.5–14.5)
GFR SERPL CREATININE-BSD FRML MDRD: 6 ML/MIN/1.73M2
GLUCOSE SERPL-MCNC: 182 MG/DL (ref 70–108)
HCT VFR BLD AUTO: 32.4 % (ref 42–52)
HGB BLD-MCNC: 9.7 GM/DL (ref 14–18)
IMM GRANULOCYTES # BLD AUTO: 0.02 THOU/MM3 (ref 0–0.07)
IMM GRANULOCYTES NFR BLD AUTO: 0.4 %
LYMPHOCYTES ABSOLUTE: 0.6 THOU/MM3 (ref 1–4.8)
LYMPHOCYTES NFR BLD AUTO: 13.6 %
MCH RBC QN AUTO: 32.7 PG (ref 26–33)
MCHC RBC AUTO-ENTMCNC: 29.9 GM/DL (ref 32.2–35.5)
MCV RBC AUTO: 109.1 FL (ref 80–94)
MONOCYTES ABSOLUTE: 0.4 THOU/MM3 (ref 0.4–1.3)
MONOCYTES NFR BLD AUTO: 8.5 %
NEUTROPHILS NFR BLD AUTO: 74.4 %
NRBC BLD AUTO-RTO: 0 /100 WBC
PLATELET # BLD AUTO: 108 THOU/MM3 (ref 130–400)
PMV BLD AUTO: 10 FL (ref 9.4–12.4)
POTASSIUM SERPL-SCNC: 5.8 MEQ/L (ref 3.5–5.2)
RBC # BLD AUTO: 2.97 MILL/MM3 (ref 4.7–6.1)
SEGMENTED NEUTROPHILS ABSOLUTE COUNT: 3.5 THOU/MM3 (ref 1.8–7.7)
SODIUM SERPL-SCNC: 139 MEQ/L (ref 135–145)
WBC # BLD AUTO: 4.7 THOU/MM3 (ref 4.8–10.8)

## 2024-04-03 PROCEDURE — 6370000000 HC RX 637 (ALT 250 FOR IP): Performed by: INTERNAL MEDICINE

## 2024-04-03 PROCEDURE — 85025 COMPLETE CBC W/AUTO DIFF WBC: CPT

## 2024-04-03 PROCEDURE — 93010 ELECTROCARDIOGRAM REPORT: CPT | Performed by: INTERNAL MEDICINE

## 2024-04-03 PROCEDURE — 36415 COLL VENOUS BLD VENIPUNCTURE: CPT

## 2024-04-03 PROCEDURE — 99232 SBSQ HOSP IP/OBS MODERATE 35: CPT | Performed by: HOSPITALIST

## 2024-04-03 PROCEDURE — 2580000003 HC RX 258: Performed by: STUDENT IN AN ORGANIZED HEALTH CARE EDUCATION/TRAINING PROGRAM

## 2024-04-03 PROCEDURE — 6360000002 HC RX W HCPCS: Performed by: HOSPITALIST

## 2024-04-03 PROCEDURE — 2060000000 HC ICU INTERMEDIATE R&B

## 2024-04-03 PROCEDURE — 6370000000 HC RX 637 (ALT 250 FOR IP)

## 2024-04-03 PROCEDURE — 99232 SBSQ HOSP IP/OBS MODERATE 35: CPT | Performed by: INTERNAL MEDICINE

## 2024-04-03 PROCEDURE — 80048 BASIC METABOLIC PNL TOTAL CA: CPT

## 2024-04-03 PROCEDURE — 6370000000 HC RX 637 (ALT 250 FOR IP): Performed by: STUDENT IN AN ORGANIZED HEALTH CARE EDUCATION/TRAINING PROGRAM

## 2024-04-03 PROCEDURE — 5A1D70Z PERFORMANCE OF URINARY FILTRATION, INTERMITTENT, LESS THAN 6 HOURS PER DAY: ICD-10-PCS | Performed by: INTERNAL MEDICINE

## 2024-04-03 RX ORDER — HEPARIN SODIUM 5000 [USP'U]/ML
5000 INJECTION, SOLUTION INTRAVENOUS; SUBCUTANEOUS EVERY 8 HOURS SCHEDULED
Status: DISCONTINUED | OUTPATIENT
Start: 2024-04-03 | End: 2024-04-05 | Stop reason: HOSPADM

## 2024-04-03 RX ORDER — SEVELAMER CARBONATE 800 MG/1
800 TABLET, FILM COATED ORAL
Status: DISCONTINUED | OUTPATIENT
Start: 2024-04-03 | End: 2024-04-05 | Stop reason: HOSPADM

## 2024-04-03 RX ADMIN — BENZONATATE 100 MG: 100 CAPSULE ORAL at 20:06

## 2024-04-03 RX ADMIN — Medication 1000 UNITS: at 08:26

## 2024-04-03 RX ADMIN — HEPARIN SODIUM 5000 UNITS: 5000 INJECTION INTRAVENOUS; SUBCUTANEOUS at 16:53

## 2024-04-03 RX ADMIN — Medication 240 MG: at 16:54

## 2024-04-03 RX ADMIN — CLONIDINE HYDROCHLORIDE 0.3 MG: 0.2 TABLET ORAL at 16:54

## 2024-04-03 RX ADMIN — ATORVASTATIN CALCIUM 40 MG: 40 TABLET, FILM COATED ORAL at 20:05

## 2024-04-03 RX ADMIN — DOXAZOSIN 4 MG: 4 TABLET ORAL at 08:25

## 2024-04-03 RX ADMIN — Medication 240 MG: at 08:24

## 2024-04-03 RX ADMIN — CLONIDINE HYDROCHLORIDE 0.3 MG: 0.2 TABLET ORAL at 20:05

## 2024-04-03 RX ADMIN — HYDRALAZINE HYDROCHLORIDE 100 MG: 50 TABLET ORAL at 22:28

## 2024-04-03 RX ADMIN — SODIUM CHLORIDE, PRESERVATIVE FREE 10 ML: 5 INJECTION INTRAVENOUS at 20:06

## 2024-04-03 RX ADMIN — AMLODIPINE BESYLATE 10 MG: 10 TABLET ORAL at 08:24

## 2024-04-03 RX ADMIN — FOLIC ACID 1 MG: 1 TABLET ORAL at 08:25

## 2024-04-03 RX ADMIN — SODIUM CHLORIDE, PRESERVATIVE FREE 10 ML: 5 INJECTION INTRAVENOUS at 08:27

## 2024-04-03 RX ADMIN — HYDRALAZINE HYDROCHLORIDE 100 MG: 50 TABLET ORAL at 06:25

## 2024-04-03 RX ADMIN — CALCITRIOL CAPSULES 0.25 MCG 0.5 MCG: 0.25 CAPSULE ORAL at 08:25

## 2024-04-03 RX ADMIN — GUAIFENESIN 600 MG: 600 TABLET, EXTENDED RELEASE ORAL at 20:06

## 2024-04-03 RX ADMIN — ASPIRIN 81 MG: 81 TABLET, COATED ORAL at 08:25

## 2024-04-03 RX ADMIN — CLONIDINE HYDROCHLORIDE 0.3 MG: 0.2 TABLET ORAL at 08:25

## 2024-04-03 RX ADMIN — DOXAZOSIN 4 MG: 4 TABLET ORAL at 20:06

## 2024-04-03 RX ADMIN — HEPARIN SODIUM 5000 UNITS: 5000 INJECTION INTRAVENOUS; SUBCUTANEOUS at 22:28

## 2024-04-03 RX ADMIN — GUAIFENESIN 600 MG: 600 TABLET, EXTENDED RELEASE ORAL at 08:26

## 2024-04-03 RX ADMIN — SERTRALINE 100 MG: 100 TABLET, FILM COATED ORAL at 08:26

## 2024-04-03 RX ADMIN — HYDRALAZINE HYDROCHLORIDE 100 MG: 50 TABLET ORAL at 16:59

## 2024-04-03 RX ADMIN — FAMOTIDINE 20 MG: 20 TABLET, FILM COATED ORAL at 08:25

## 2024-04-03 RX ADMIN — CINACALCET HYDROCHLORIDE 150 MG: 30 TABLET, FILM COATED ORAL at 08:25

## 2024-04-03 RX ADMIN — Medication 1 TABLET: at 08:38

## 2024-04-03 ASSESSMENT — PAIN SCALES - GENERAL
PAINLEVEL_OUTOF10: 0

## 2024-04-03 NOTE — PROGRESS NOTES
[facility]   Nephrology progress note  4/3/2024, 9:34 AM      Pt Name:    Trav Jennings  MRN:     [id number]     YOB: 1965  Admit Date:    4/2/2024  4:15 AM    Chief Complaint: Nephrology following for ESRD.    Subjective:  Patient was seen and examined this morning  Feels ok but has cough.     Objective:  24HR INTAKE/OUTPUT:    Intake/Output Summary (Last 24 hours) at 4/3/2024 0934  Last data filed at 4/2/2024 1845  Gross per 24 hour   Intake 700 ml   Output 4400 ml   Net -3700 ml         I/O last 3 completed shifts:  In: 700 [P.O.:300]  Out: 4400   No intake/output data recorded.   Admission weight: 97.6 kg (215 lb 2.7 oz)  Wt Readings from Last 3 Encounters:   04/02/24 93.6 kg (206 lb 5.6 oz)   03/10/24 93.9 kg (207 lb)   03/07/24 94 kg (207 lb 3.7 oz)        Vitals :   Vitals:    04/02/24 2217 04/02/24 2310 04/03/24 0400 04/03/24 0730   BP: (!) 152/67 138/63 (!) 162/65 (!) 185/71   Pulse:  79 75 89   Resp:  16 14 16   Temp:  98 °F (36.7 °C) 97.9 °F (36.6 °C) 98.2 °F (36.8 °C)   TempSrc:  Oral Oral Oral   SpO2:  94% 95% 100%   Weight:       Height:           Physical examination  General Appearance: alert and cooperative with exam, appears comfortable, no distress  Mouth/Throat: Oral mucosa moist  Neck: No JVD  Lungs: faint rales right lung base  Heart:  S1, S2 heard  GI: soft, non-tender, no guarding  Extremities: no significant edema    Medications:  Infusion:    nitroGLYCERIN 70 mcg/min (04/02/24 1701)    sodium chloride       Meds:    sodium chloride flush  5-40 mL IntraVENous 2 times per day    aspirin  81 mg Oral Daily    atorvastatin  40 mg Oral Nightly    calcitRIOL  0.5 mcg Oral Once per day on Mon Wed Fri    cinacalcet  150 mg Oral Once per day on Mon Wed Fri    famotidine  20 mg Oral Daily    ferrous gluconate  240 mg Oral TID WC    folic acid  1 mg Oral Daily    sertraline  100 mg Oral Daily    Vitamin D  1,000 Units Oral Daily    multivitamin  1 tablet Oral Daily     hydrALAZINE  100 mg Oral 3 times per day    amLODIPine  10 mg Oral Daily    cloNIDine  0.3 mg Oral TID    doxazosin  4 mg Oral BID    guaiFENesin  600 mg Oral BID     Meds prn: sodium chloride flush, sodium chloride, ondansetron **OR** ondansetron, polyethylene glycol, acetaminophen **OR** acetaminophen, albuterol sulfate HFA, labetalol, guaiFENesin-dextromethorphan, benzonatate     Lab Data :  CBC:   Recent Labs     04/02/24 0420 04/03/24  0320   WBC 8.3 4.7*   HGB 11.0* 9.7*   HCT 34.4* 32.4*    108*     CMP:  Recent Labs     04/02/24 0420 04/03/24  0743    139   K 5.9* 5.8*   CL 97* 100   CO2 20* 18*   BUN 93* 69*   CREATININE 10.6* 8.8*   GLUCOSE 135* 182*   CALCIUM 8.3* 7.9*   MG 2.6*  --    PHOS 6.0*  --      Hepatic:   Recent Labs     04/02/24 0420   LABALBU 4.1   AST 18   ALT 13   BILITOT 0.4   ALKPHOS 138*         Assessment and Plan:  ESRD on HD TTS  K is still high 5.8 will plan extra dialysis treatment today  Hypertensive emergency due to noncompliance with meds and cocaine use: improving  Pulmonary edema due to HTN; improved  Hyperkalemia: HD again today. 2 K bath  Mild metabolic acidosis, adjust with HD  Macrocytic anemia  Polysubstance abuse  Secondary hyperparathyroidism, on calcitriol, sensipar, phos binder. ( Start renvela since velphoro not on formulary)    D/W patient and RN    Hermila Clayton, DO  [facility]    This report has been created using voice recognition software. It may contain minor errors which are inherent in voice recognition technology

## 2024-04-03 NOTE — FLOWSHEET NOTE
Stable 3 hour and 45 minute treatment complete. Treatment ended 15 minutes early per patients request. Removed 2 liters of fluid as per order. Tolerated fluid removal well. Pressure held to needle sites times ten minutes each. Dressing clean, dry and intact. Report given to primary RN. Treatment record printed for scanning into EMR.    04/03/24 1115 04/03/24 1538   Vital Signs   BP (!) 145/75 (!) 190/91   Temp 98 °F (36.7 °C) 98 °F (36.7 °C)   Pulse 80 93   Respirations 18 19   SpO2 100 % 100 %   Weight - Scale 93.8 kg (206 lb 12.7 oz) 91.8 kg (202 lb 6.1 oz)   Weight Method Bed scale Bed scale   Percent Weight Change 0.21 -1.92   Post-Hemodialysis Assessment   Post-Treatment Procedures  --  Blood returned;Access bleeding time < 10 minutes   Machine Disinfection Process  --  Acid/Vinegar Clean;Heat Disinfect;Exterior Machine Disinfection   Blood Volume Processed (Liters)  --  108.2 L   Dialyzer Clearance  --  Lightly streaked   Duration of Treatment (minutes)  --  225 minutes   Heparin Amount Administered During Treatment (mL)  --  0 mL   Hemodialysis Intake (ml)  --  400 ml   Hemodialysis Output (ml)  --  2400 ml   NET Removed (ml)  --  2000   Tolerated Treatment  --  Good

## 2024-04-03 NOTE — PROGRESS NOTES
Patient A&Ox4. Pupils round and reactive to light. Speech clear and appropriate. Respiratory regular and unlabored. Lungs clear and diminished. Cardiac regular, patient on telemetry. Abdomen rounded and soft. Bowel sounds active in all four quadrants. Upper extremities dry, and warm. Capillary refills less than three seconds. Hand grasp strong and equal. Left fistula bruit and thrill present. Lower extremities warm and dry. Skin turgor less than three seconds. Pedal push and pull strong and equal bilaterally. Patient sitting in chair with possessions in reach.

## 2024-04-03 NOTE — CARE COORDINATION
04/03/24 1125   Readmission Assessment   Number of Days since last admission? 8-30 days   Previous Disposition Home with Family   Who is being Interviewed Patient   What was the patient's/caregiver's perception as to why they think they needed to return back to the hospital? Other (Comment)  (\" I was short of breath\")   Did you visit your Primary Care Physician after you left the hospital, before you returned this time? Yes   Did you see a specialist, such as Cardiac, Pulmonary, Orthopedic Physician, etc. after you left the hospital? Yes  (Nephrologist)   Who advised the patient to return to the hospital? Self-referral   Does the patient report anything that got in the way of taking their medications? No   In our efforts to provide the best possible care to you and others like you, can you think of anything that we could have done to help you after you left the hospital the first time, so that you might not have needed to return so soon? Other (Comment)  (\" I was ready to go home\")

## 2024-04-03 NOTE — CARE COORDINATION
Case Management Assessment  Initial Evaluation    Date/Time of Evaluation: 4/3/2024 11:20 AM  Assessment Completed by: Neelima Gay RN    If patient is discharged prior to next notation, then this note serves as note for discharge by case management.    Patient Name: Trav Jennings                   YOB: 1965  Diagnosis: Shortness of breath [R06.02]  ESRD (end stage renal disease) on dialysis (HCC) [N18.6, Z99.2]  Hypertensive emergency [I16.1]  Acute on chronic respiratory failure with hypoxia (HCC) [J96.21]                   Date / Time: 4/2/2024  4:15 AM  Location: [facility]     Patient Admission Status: Inpatient   Readmission Risk Low 0-14, Mod 15-19), High > 20: Readmission Risk Score: 33.7    Current PCP: Radha Hou APRN - CNP  PCP verified by CM? Yes    Chart Reviewed: Yes      History Provided by: Patient  Patient Orientation: Alert and Oriented    Patient Cognition: Alert    Hospitalization in the last 30 days (Readmission):  Yes    If yes, Readmission Assessment in CM Navigator will be completed.    Advance Directives:      Code Status: Full Code   Patient's Primary Decision Maker is: Patient Declined (Legal Next of Kin Remains as Decision Maker)      Discharge Planning:    Patient lives with: Friends Type of Home: House (Friend's house)  Primary Care Giver: Self  Patient Support Systems include: Friends/Neighbors   Current Financial resources: Medicaid, Medicare  Current community resources: None  Current services prior to admission: Oxygen Therapy (2 liters [facility])            Current DME: Oxygen Therapy (Comment) (2 liters [facility])            Type of Home Care services:  None    ADLS  Prior functional level: Independent in ADLs/IADLs  Current functional level: Independent in ADLs/IADLs    Family can provide assistance at DC: No  Would you like Case Management to discuss the discharge plan with any other family members/significant others, and if so, who? No  Plans to Return  (HCC) [N18.6, Z99.2]  Hypertensive emergency [I16.1]  Acute on chronic respiratory failure with hypoxia (HCC) [J96.21]    Patient Goals/Plan/Treatment Preferences: Met with Trav. He currently lives at a friend's house. He is independent. He is a [facility] HD pt on T-TH-S at 0500. He has home O2 at 2 liters supplied by [facility]. Plan is to return home to his friend's house. He denies need for DME and declines HH.   Transportation/Food Security/Housekeeping Addressed: No issues identified.     Neelima Gay RN  Case Management Department

## 2024-04-03 NOTE — PROGRESS NOTES
Hospitalist Progress Note    Patient:  Trav Jennings      Unit/Bed:4A-04/004-A    YOB: 1965    MRN: [id number]       Acct: [id number]     PCP: Radha Hou APRN - CNP    Date of Admission: 4/2/2024    Assessment/Plan:    Hypertensive emergency: Secondary to noncompliance and drug abuse, positive for cocaine abuse.  BP is slowly improving, continue with amlodipine, clonidine as needed hydralazine.    End-stage renal disease: Appreciate nephrology input, patient still volume overloaded plan for additional dialysis today.  Hyperkalemia: HD again today.  Pulmonary edema: Secondary to hypertensive emergency, dialysis for fluid removal  Polysubstance abuse: Cessation advised.  Acute on chronic hypoxemic respiratory failure: Weaned down to his baseline 3 L nasal cannula now.  REZA:  History of AAA repair  DVT prophylaxis: Heparin          Subjective (past 24 hours):   Patient seen and examined at bedside, sleepy but states feeling slightly better than when he came in.  No reports of chest pain today.  Still with higher pressures and elevated potassium today.      Medications:  Reviewed    Infusion Medications    nitroGLYCERIN 70 mcg/min (04/02/24 1701)    sodium chloride       Scheduled Medications    sevelamer  800 mg Oral TID WC    sodium chloride flush  5-40 mL IntraVENous 2 times per day    aspirin  81 mg Oral Daily    atorvastatin  40 mg Oral Nightly    calcitRIOL  0.5 mcg Oral Once per day on Mon Wed Fri    cinacalcet  150 mg Oral Once per day on Mon Wed Fri    famotidine  20 mg Oral Daily    ferrous gluconate  240 mg Oral TID WC    folic acid  1 mg Oral Daily    sertraline  100 mg Oral Daily    Vitamin D  1,000 Units Oral Daily    multivitamin  1 tablet Oral Daily    hydrALAZINE  100 mg Oral 3 times per day    amLODIPine  10 mg Oral Daily    cloNIDine  0.3 mg Oral TID    doxazosin  4 mg Oral BID    guaiFENesin  600 mg Oral BID     PRN Meds: sodium chloride flush, sodium chloride, ondansetron  Comparison: CR/SR - XR CHEST PORTABLE - 02/16/2024 02:46 AM EST Findings: Stable cardiomegaly. Unchanged positioning of endovascular stent graft. Prominence of the bilateral pulmonary vasculature. Increased interstitial markings involving the central and lower lung zones may represent interstitial edema possibly in the setting of congestive failure. Chronically elevated right hemidiaphragm with small right basilar effusion and/or chronic pleural thickening of the right lung base. Fluid within the minor fissure. The left CP angle is not entirely included on the current exam. Surgical clips of the base of the left neck. Osseous structures are intact.     1. Question congestive failure/fluid overload. Stable cardiomegaly with pulmonary vascular prominence. Increased central and lower lobe predominant interstitial opacities may reflect pulmonary edema. 2. Chronically elevated right hemidiaphragm with small right basilar pleural effusion and/or chronic right basilar pleural thickening. This document has been electronically signed by: Jimmy York DO on 04/02/2024 05:57 AM      DVT prophylaxis: [] Lovenox                                 [] SCDs                                 [] SQ Heparin                                 [] Encourage ambulation           [] Already on Anticoagulation     Code Status: Full Code    Tele:   [] yes             [] no    Active Hospital Problems    Diagnosis Date Noted    Shortness of breath [R06.02] 04/02/2024    Acute on chronic respiratory failure with hypoxia (HCC) [J96.21] 12/23/2023    ESRD (end stage renal disease) on dialysis (HCC) [N18.6, Z99.2] 04/01/2023       Electronically signed by Ezekiel Soliz MD on 4/3/2024 at 12:41 PM

## 2024-04-04 LAB
ACANTHOCYTES: ABNORMAL
ANION GAP SERPL CALC-SCNC: 12 MEQ/L (ref 8–16)
BASOPHILS ABSOLUTE: 0 THOU/MM3 (ref 0–0.1)
BASOPHILS NFR BLD AUTO: 0.3 %
BUN SERPL-MCNC: 42 MG/DL (ref 7–22)
CALCIUM SERPL-MCNC: 6.7 MG/DL (ref 8.5–10.5)
CHLORIDE SERPL-SCNC: 102 MEQ/L (ref 98–111)
CO2 SERPL-SCNC: 24 MEQ/L (ref 23–33)
CREAT SERPL-MCNC: 6.2 MG/DL (ref 0.4–1.2)
DEPRECATED RDW RBC AUTO: 54.6 FL (ref 35–45)
EKG ATRIAL RATE: 101 BPM
EKG ATRIAL RATE: 93 BPM
EKG P AXIS: 54 DEGREES
EKG P AXIS: 60 DEGREES
EKG P-R INTERVAL: 196 MS
EKG P-R INTERVAL: 204 MS
EKG Q-T INTERVAL: 382 MS
EKG Q-T INTERVAL: 396 MS
EKG QRS DURATION: 104 MS
EKG QRS DURATION: 110 MS
EKG QTC CALCULATION (BAZETT): 492 MS
EKG QTC CALCULATION (BAZETT): 495 MS
EKG R AXIS: -46 DEGREES
EKG R AXIS: -46 DEGREES
EKG T AXIS: 91 DEGREES
EKG T AXIS: 96 DEGREES
EKG VENTRICULAR RATE: 101 BPM
EKG VENTRICULAR RATE: 93 BPM
ELLIPTOCYTES: ABNORMAL
EOSINOPHIL NFR BLD AUTO: 2.3 %
EOSINOPHILS ABSOLUTE: 0.1 THOU/MM3 (ref 0–0.4)
ERYTHROCYTE [DISTWIDTH] IN BLOOD BY AUTOMATED COUNT: 13.8 % (ref 11.5–14.5)
GFR SERPL CREATININE-BSD FRML MDRD: 10 ML/MIN/1.73M2
GLUCOSE SERPL-MCNC: 164 MG/DL (ref 70–108)
HCT VFR BLD AUTO: 30.3 % (ref 42–52)
HGB BLD-MCNC: 9.2 GM/DL (ref 14–18)
IMM GRANULOCYTES # BLD AUTO: 0.01 THOU/MM3 (ref 0–0.07)
IMM GRANULOCYTES NFR BLD AUTO: 0.3 %
LYMPHOCYTES ABSOLUTE: 0.6 THOU/MM3 (ref 1–4.8)
LYMPHOCYTES NFR BLD AUTO: 14.7 %
MCH RBC QN AUTO: 33.1 PG (ref 26–33)
MCHC RBC AUTO-ENTMCNC: 30.4 GM/DL (ref 32.2–35.5)
MCV RBC AUTO: 109 FL (ref 80–94)
MONOCYTES ABSOLUTE: 0.4 THOU/MM3 (ref 0.4–1.3)
MONOCYTES NFR BLD AUTO: 10 %
NEUTROPHILS NFR BLD AUTO: 72.4 %
NRBC BLD AUTO-RTO: 0 /100 WBC
PHOSPHATE SERPL-MCNC: 4.6 MG/DL (ref 2.4–4.7)
PLATELET # BLD AUTO: 99 THOU/MM3 (ref 130–400)
PLATELET BLD QL SMEAR: ABNORMAL
PMV BLD AUTO: 10.4 FL (ref 9.4–12.4)
POTASSIUM SERPL-SCNC: 5.1 MEQ/L (ref 3.5–5.2)
RBC # BLD AUTO: 2.78 MILL/MM3 (ref 4.7–6.1)
SCAN OF BLOOD SMEAR: NORMAL
SEGMENTED NEUTROPHILS ABSOLUTE COUNT: 2.8 THOU/MM3 (ref 1.8–7.7)
SODIUM SERPL-SCNC: 138 MEQ/L (ref 135–145)
SPHEROCYTES BLD QL SMEAR: ABNORMAL
WBC # BLD AUTO: 3.9 THOU/MM3 (ref 4.8–10.8)

## 2024-04-04 PROCEDURE — 90935 HEMODIALYSIS ONE EVALUATION: CPT

## 2024-04-04 PROCEDURE — 6370000000 HC RX 637 (ALT 250 FOR IP): Performed by: INTERNAL MEDICINE

## 2024-04-04 PROCEDURE — 80048 BASIC METABOLIC PNL TOTAL CA: CPT

## 2024-04-04 PROCEDURE — 2060000000 HC ICU INTERMEDIATE R&B

## 2024-04-04 PROCEDURE — 2580000003 HC RX 258: Performed by: STUDENT IN AN ORGANIZED HEALTH CARE EDUCATION/TRAINING PROGRAM

## 2024-04-04 PROCEDURE — 2700000000 HC OXYGEN THERAPY PER DAY

## 2024-04-04 PROCEDURE — 6370000000 HC RX 637 (ALT 250 FOR IP): Performed by: HOSPITALIST

## 2024-04-04 PROCEDURE — 36415 COLL VENOUS BLD VENIPUNCTURE: CPT

## 2024-04-04 PROCEDURE — 94761 N-INVAS EAR/PLS OXIMETRY MLT: CPT

## 2024-04-04 PROCEDURE — 6360000002 HC RX W HCPCS: Performed by: HOSPITALIST

## 2024-04-04 PROCEDURE — 99232 SBSQ HOSP IP/OBS MODERATE 35: CPT | Performed by: INTERNAL MEDICINE

## 2024-04-04 PROCEDURE — 6370000000 HC RX 637 (ALT 250 FOR IP)

## 2024-04-04 PROCEDURE — 99232 SBSQ HOSP IP/OBS MODERATE 35: CPT | Performed by: HOSPITALIST

## 2024-04-04 PROCEDURE — 85025 COMPLETE CBC W/AUTO DIFF WBC: CPT

## 2024-04-04 PROCEDURE — 6370000000 HC RX 637 (ALT 250 FOR IP): Performed by: STUDENT IN AN ORGANIZED HEALTH CARE EDUCATION/TRAINING PROGRAM

## 2024-04-04 PROCEDURE — 84100 ASSAY OF PHOSPHORUS: CPT

## 2024-04-04 RX ORDER — CINACALCET 30 MG/1
90 TABLET, FILM COATED ORAL
Status: DISCONTINUED | OUTPATIENT
Start: 2024-04-05 | End: 2024-04-05 | Stop reason: HOSPADM

## 2024-04-04 RX ORDER — SIMETHICONE 80 MG
80 TABLET,CHEWABLE ORAL EVERY 6 HOURS PRN
Status: DISCONTINUED | OUTPATIENT
Start: 2024-04-04 | End: 2024-04-05 | Stop reason: HOSPADM

## 2024-04-04 RX ORDER — CETIRIZINE HYDROCHLORIDE 10 MG/1
10 TABLET ORAL DAILY
Qty: 30 TABLET | Refills: 0 | Status: ON HOLD | OUTPATIENT
Start: 2024-04-04 | End: 2024-05-04

## 2024-04-04 RX ORDER — AMLODIPINE BESYLATE 10 MG/1
10 TABLET ORAL DAILY
Qty: 30 TABLET | Refills: 0 | Status: ON HOLD | OUTPATIENT
Start: 2024-04-04

## 2024-04-04 RX ADMIN — SERTRALINE 100 MG: 100 TABLET, FILM COATED ORAL at 13:00

## 2024-04-04 RX ADMIN — Medication 1000 UNITS: at 13:00

## 2024-04-04 RX ADMIN — Medication 240 MG: at 13:00

## 2024-04-04 RX ADMIN — SODIUM CHLORIDE, PRESERVATIVE FREE 10 ML: 5 INJECTION INTRAVENOUS at 20:44

## 2024-04-04 RX ADMIN — ASPIRIN 81 MG: 81 TABLET, COATED ORAL at 15:04

## 2024-04-04 RX ADMIN — ATORVASTATIN CALCIUM 40 MG: 40 TABLET, FILM COATED ORAL at 20:43

## 2024-04-04 RX ADMIN — Medication 240 MG: at 18:22

## 2024-04-04 RX ADMIN — HYDRALAZINE HYDROCHLORIDE 100 MG: 50 TABLET ORAL at 06:35

## 2024-04-04 RX ADMIN — DOXAZOSIN 4 MG: 4 TABLET ORAL at 20:43

## 2024-04-04 RX ADMIN — Medication 1 TABLET: at 13:00

## 2024-04-04 RX ADMIN — GUAIFENESIN AND DEXTROMETHORPHAN 5 ML: 100; 10 SYRUP ORAL at 14:47

## 2024-04-04 RX ADMIN — GUAIFENESIN 600 MG: 600 TABLET, EXTENDED RELEASE ORAL at 13:00

## 2024-04-04 RX ADMIN — FAMOTIDINE 20 MG: 20 TABLET, FILM COATED ORAL at 15:04

## 2024-04-04 RX ADMIN — HYDRALAZINE HYDROCHLORIDE 100 MG: 50 TABLET ORAL at 14:54

## 2024-04-04 RX ADMIN — SIMETHICONE 80 MG: 80 TABLET, CHEWABLE ORAL at 10:48

## 2024-04-04 RX ADMIN — SEVELAMER CARBONATE 800 MG: 800 TABLET, FILM COATED ORAL at 18:22

## 2024-04-04 RX ADMIN — HEPARIN SODIUM 5000 UNITS: 5000 INJECTION INTRAVENOUS; SUBCUTANEOUS at 20:44

## 2024-04-04 RX ADMIN — HEPARIN SODIUM 5000 UNITS: 5000 INJECTION INTRAVENOUS; SUBCUTANEOUS at 15:04

## 2024-04-04 RX ADMIN — CLONIDINE HYDROCHLORIDE 0.3 MG: 0.2 TABLET ORAL at 14:57

## 2024-04-04 RX ADMIN — GUAIFENESIN 600 MG: 600 TABLET, EXTENDED RELEASE ORAL at 20:43

## 2024-04-04 RX ADMIN — FOLIC ACID 1 MG: 1 TABLET ORAL at 13:00

## 2024-04-04 RX ADMIN — CLONIDINE HYDROCHLORIDE 0.3 MG: 0.2 TABLET ORAL at 20:43

## 2024-04-04 RX ADMIN — HEPARIN SODIUM 5000 UNITS: 5000 INJECTION INTRAVENOUS; SUBCUTANEOUS at 06:35

## 2024-04-04 RX ADMIN — HYDRALAZINE HYDROCHLORIDE 100 MG: 50 TABLET ORAL at 23:22

## 2024-04-04 ASSESSMENT — PAIN SCALES - WONG BAKER
WONGBAKER_NUMERICALRESPONSE: NO HURT
WONGBAKER_NUMERICALRESPONSE: NO HURT

## 2024-04-04 ASSESSMENT — PAIN SCALES - GENERAL
PAINLEVEL_OUTOF10: 0

## 2024-04-04 NOTE — PROGRESS NOTES
[facility]   Nephrology progress note  4/4/2024, 10:35 AM      Pt Name:    Trav Jennings  MRN:     [id number]     YOB: 1965  Admit Date:    4/2/2024  4:15 AM    Chief Complaint: Nephrology following for ESRD.    Subjective:  Patient was seen and examined this morning  Feels ok. No overnight events.    Objective:  24HR INTAKE/OUTPUT:    Intake/Output Summary (Last 24 hours) at 4/4/2024 1035  Last data filed at 4/3/2024 1538  Gross per 24 hour   Intake 400 ml   Output 2400 ml   Net -2000 ml         I/O last 3 completed shifts:  In: 550 [P.O.:150]  Out: 2400   No intake/output data recorded.   Admission weight: 97.6 kg (215 lb 2.7 oz)  Wt Readings from Last 3 Encounters:   04/04/24 93.6 kg (206 lb 5.6 oz)   03/10/24 93.9 kg (207 lb)   03/07/24 94 kg (207 lb 3.7 oz)        Vitals :   Vitals:    04/03/24 2003 04/03/24 2338 04/04/24 0345 04/04/24 0738   BP: (!) 157/75 (!) 120/57 133/64 131/68   Pulse: 69 82 79 86   Resp:  16 17 17   Temp: 98.1 °F (36.7 °C) 98.2 °F (36.8 °C) 97.5 °F (36.4 °C) 97.8 °F (36.6 °C)   TempSrc:  Oral Oral    SpO2: 97% 100% 100% 97%   Weight:   94.3 kg (207 lb 14.3 oz) 93.6 kg (206 lb 5.6 oz)   Height:           Physical examination  General Appearance: alert and cooperative with exam, appears comfortable, no distress  Mouth/Throat: Oral mucosa moist  Neck: No JVD  Lungs: diminished  Heart:  S1, S2 heard  GI: soft, non-tender, no guarding  Extremities: no significant edema    Medications:  Infusion:    nitroGLYCERIN 70 mcg/min (04/02/24 1701)    sodium chloride       Meds:    [START ON 4/5/2024] cinacalcet  90 mg Oral Once per day on Mon Wed Fri    sevelamer  800 mg Oral TID WC    heparin (porcine)  5,000 Units SubCUTAneous 3 times per day    sodium chloride flush  5-40 mL IntraVENous 2 times per day    aspirin  81 mg Oral Daily    atorvastatin  40 mg Oral Nightly    calcitRIOL  0.5 mcg Oral Once per day on Mon Wed Fri    famotidine  20 mg Oral Daily    ferrous

## 2024-04-04 NOTE — CARE COORDINATION
4/4/24, 9:05 AM EDT    DISCHARGE ON GOING EVALUATION    Trav [facility] day: 2  Location: -04/004-A Reason for admit: Shortness of breath [R06.02]  ESRD (end stage renal disease) on dialysis (HCC) [N18.6, Z99.2]  Hypertensive emergency [I16.1]  Acute on chronic respiratory failure with hypoxia (HCC) [J96.21]   Procedure:   4/2 CXR 1. Question congestive failure/fluid overload. Stable cardiomegaly with   pulmonary vascular prominence. Increased central and lower lobe   predominant interstitial opacities may reflect pulmonary edema.   2. Chronically elevated right hemidiaphragm with small right basilar   pleural effusion and/or chronic right basilar pleural thickening.      Barriers to Discharge: Creatinine 6.2, Nephrology following for HD, Nitro drip.  PCP: Radha Hou, WADE - CNP  Readmission Risk Score: 34.5%  Patient Goals/Plan/Treatment Preferences: Plans return to friend's house. Denies needs.

## 2024-04-04 NOTE — FLOWSHEET NOTE
Stable 4 hour treatment, removed 1 liter of fluid patient tolerated well. pressure held to needle sites for 10 minuets each. dressing clean, dry, and intact. Report called to primary RN. Treatment record printed to be scanned into EMR.     04/04/24 0738 04/04/24 1209   Vital Signs   /68 (!) 160/78   Temp 97.8 °F (36.6 °C) 97.6 °F (36.4 °C)   Pulse 86 83   Respirations 17 16   SpO2 97 %  --    Weight - Scale 93.6 kg (206 lb 5.6 oz) 96.2 kg (212 lb 1.3 oz)   Weight Method Standing scale Bed scale   Percent Weight Change -0.74 2.78   Post-Hemodialysis Assessment   Post-Treatment Procedures  --  Blood returned;Access bleeding time > 10 minutes   Machine Disinfection Process  --  Acid/Vinegar Clean;Heat Disinfect;Exterior Machine Disinfection   Blood Volume Processed (Liters)  --  111.2 L   Dialyzer Clearance  --  Lightly streaked   Duration of Treatment (minutes)  --  240 minutes   Hemodialysis Intake (ml)  --  400 ml   Hemodialysis Output (ml)  --  1400 ml   NET Removed (ml)  --  1000   Tolerated Treatment  --  Good

## 2024-04-04 NOTE — PROGRESS NOTES
A home oxygen evaluation has been completed.     [x]Patient is an inpatient. It is expected that the patient will be discharged within the next 48 hours. Qualified provider to write order for home prescription if patient qualifies. Social service/care managers will arrange for home oxygen.  If patient is active, arrange for Home Medical supplier to assess for Oxygen Conserving Device per pulse oximetry.  []Patient is an outpatient. Results will be faxed to the ordering provider. Qualified provider to write order for home prescription if patient qualifies and arranges for home oxygen.      Patient was placed on room air for 40 minutes. SpO2 was 92 % on room air at rest. Patients SpO2 was not below 89% and did not qualified for home oxygen at rest. Oxygen was not applied  to maintain a SpO2 between 90-92% while at rest. Actual SpO2 was 92 %. Patient can ambulate for exercise flow rate. Patients was ambulated, SpO2 was 85% on room air, O2 was applied at 1lpm for 87%, 2lpm for 89 and 3lpm for 91% to maintain SpO2 between 90-92% while exercising.      Note: For any SpO2 at 89% see policy and procedure for possible qualifications.

## 2024-04-04 NOTE — PROGRESS NOTES
Hospitalist Progress Note    Patient:  Trav Jennings      Unit/Bed:4A-04/004-A    YOB: 1965    MRN: [id number]       Acct: [id number]     PCP: Radha Hou APRN - CNP    Date of Admission: 4/2/2024    Assessment/Plan:    Hypertensive emergency: Secondary to noncompliance and drug abuse, positive for cocaine abuse.  BP is slowly improving, continue with amlodipine, clonidine as needed hydralazine.    End-stage renal disease: Appreciate nephrology input, patient still volume overloaded plan for additional dialysis today.  Hyperkalemia: HD again today.  Pulmonary edema: Secondary to hypertensive emergency, dialysis for fluid removal  Polysubstance abuse: Cessation advised.  Acute on chronic hypoxemic respiratory failure: Weaned down to his baseline 3 L nasal cannula now.  Repeat home O2 eval reveals patient still requires 3 L with ambulation, unfortunately unable to have oxygen delivered at this time due to patient's living situation.   aware  REZA:  History of AAA repair  DVT prophylaxis: Heparin  Disposition: Discharge planning for home with home O2 however unable to discharge back to his current residence while on oxygen        Subjective (past 24 hours):   Patient seen and examined at bedside, is alert and awake states he is feeling much better.  He is reporting that his friends will not allow him back into the house with oxygen because they are smokers and he has nowhere to go at this time.    Medications:  Reviewed    Infusion Medications    nitroGLYCERIN 70 mcg/min (04/02/24 1701)    sodium chloride       Scheduled Medications    [START ON 4/5/2024] cinacalcet  90 mg Oral Once per day on Mon Wed Fri    sevelamer  800 mg Oral TID     heparin (porcine)  5,000 Units SubCUTAneous 3 times per day    sodium chloride flush  5-40 mL IntraVENous 2 times per day    aspirin  81 mg Oral Daily    atorvastatin  40 mg Oral Nightly    calcitRIOL  0.5 mcg Oral Once per day on Mon Wed Fri     famotidine  20 mg Oral Daily    ferrous gluconate  240 mg Oral TID WC    folic acid  1 mg Oral Daily    sertraline  100 mg Oral Daily    Vitamin D  1,000 Units Oral Daily    multivitamin  1 tablet Oral Daily    hydrALAZINE  100 mg Oral 3 times per day    amLODIPine  10 mg Oral Daily    cloNIDine  0.3 mg Oral TID    doxazosin  4 mg Oral BID    guaiFENesin  600 mg Oral BID     PRN Meds: simethicone, sodium chloride flush, sodium chloride, ondansetron **OR** ondansetron, polyethylene glycol, acetaminophen **OR** acetaminophen, albuterol sulfate HFA, guaiFENesin-dextromethorphan, benzonatate      Intake/Output Summary (Last 24 hours) at 4/4/2024 1651  Last data filed at 4/4/2024 1209  Gross per 24 hour   Intake 400 ml   Output 1400 ml   Net -1000 ml         Diet:  ADULT DIET; Regular; No Added Salt (3-4 gm); Low Potassium (Less than 3000 mg/day)    Exam:  BP (!) 160/78   Pulse 83   Temp 97.6 °F (36.4 °C)   Resp 16   Ht 1.905 m (6' 3\")   Wt 96.2 kg (212 lb 1.3 oz)   SpO2 92%   BMI 26.51 kg/m²     General appearance: No apparent distress, appears stated age and cooperative.  Respiratory:  Normal respiratory effort. Clear to auscultation, bilaterally without Rales/Wheezes/Rhonchi.  Cardiovascular: Regular rate and rhythm with normal S1/S2 without murmurs, rubs or gallops.  Abdomen: Soft, non-tender, non-distended with normal bowel sounds.  Extremities: no pedal edema  Skin:  no rashes    Labs:   Recent Labs     04/02/24  0420 04/03/24  0320 04/04/24  0450   WBC 8.3 4.7* 3.9*   HGB 11.0* 9.7* 9.2*   HCT 34.4* 32.4* 30.3*    108* 99*       Recent Labs     04/02/24  0420 04/03/24  0743 04/04/24  0450    139 138   K 5.9* 5.8* 5.1   CL 97* 100 102   CO2 20* 18* 24   BUN 93* 69* 42*   CREATININE 10.6* 8.8* 6.2*   CALCIUM 8.3* 7.9* 6.7*   PHOS 6.0*  --  4.6       Recent Labs     04/02/24  0420   AST 18   ALT 13   BILITOT 0.4   ALKPHOS 138*       No results for input(s): \"INR\" in the last 72 hours.  No results

## 2024-04-05 VITALS
DIASTOLIC BLOOD PRESSURE: 59 MMHG | TEMPERATURE: 98.2 F | HEART RATE: 82 BPM | RESPIRATION RATE: 16 BRPM | WEIGHT: 212.08 LBS | SYSTOLIC BLOOD PRESSURE: 139 MMHG | OXYGEN SATURATION: 99 % | BODY MASS INDEX: 26.37 KG/M2 | HEIGHT: 75 IN

## 2024-04-05 LAB
ANION GAP SERPL CALC-SCNC: 14 MEQ/L (ref 8–16)
ANISOCYTOSIS BLD QL SMEAR: PRESENT
AUTO DIFF PNL BLD: ABNORMAL
BASOPHILS ABSOLUTE: 0 THOU/MM3 (ref 0–0.1)
BASOPHILS NFR BLD AUTO: 0.6 %
BUN SERPL-MCNC: 32 MG/DL (ref 7–22)
BURR CELLS BLD QL SMEAR: ABNORMAL
CALCIUM SERPL-MCNC: 7.5 MG/DL (ref 8.5–10.5)
CHLORIDE SERPL-SCNC: 102 MEQ/L (ref 98–111)
CO2 SERPL-SCNC: 24 MEQ/L (ref 23–33)
CREAT SERPL-MCNC: 5.4 MG/DL (ref 0.4–1.2)
DACROCYTES: ABNORMAL
DEPRECATED RDW RBC AUTO: 56.3 FL (ref 35–45)
EOSINOPHIL NFR BLD AUTO: 2.6 %
EOSINOPHILS ABSOLUTE: 0.1 THOU/MM3 (ref 0–0.4)
ERYTHROCYTE [DISTWIDTH] IN BLOOD BY AUTOMATED COUNT: 13.8 % (ref 11.5–14.5)
GFR SERPL CREATININE-BSD FRML MDRD: 12 ML/MIN/1.73M2
GLUCOSE SERPL-MCNC: 153 MG/DL (ref 70–108)
HCT VFR BLD AUTO: 32.2 % (ref 42–52)
HGB BLD-MCNC: 9.7 GM/DL (ref 14–18)
IMM GRANULOCYTES # BLD AUTO: 0.01 THOU/MM3 (ref 0–0.07)
IMM GRANULOCYTES NFR BLD AUTO: 0.3 %
LYMPHOCYTES ABSOLUTE: 0.6 THOU/MM3 (ref 1–4.8)
LYMPHOCYTES NFR BLD AUTO: 19.4 %
MACROCYTES BLD QL SMEAR: PRESENT
MCH RBC QN AUTO: 33.3 PG (ref 26–33)
MCHC RBC AUTO-ENTMCNC: 30.1 GM/DL (ref 32.2–35.5)
MCV RBC AUTO: 110.7 FL (ref 80–94)
MONOCYTES ABSOLUTE: 0.4 THOU/MM3 (ref 0.4–1.3)
MONOCYTES NFR BLD AUTO: 11.3 %
NEUTROPHILS NFR BLD AUTO: 65.8 %
NRBC BLD AUTO-RTO: 0 /100 WBC
PATHOLOGIST REVIEW: ABNORMAL
PLATELET # BLD AUTO: 97 THOU/MM3 (ref 130–400)
PLATELET BLD QL SMEAR: ABNORMAL
PMV BLD AUTO: 10.3 FL (ref 9.4–12.4)
POIKILOCYTES: ABNORMAL
POTASSIUM SERPL-SCNC: 5.2 MEQ/L (ref 3.5–5.2)
RBC # BLD AUTO: 2.91 MILL/MM3 (ref 4.7–6.1)
REASON FOR REJECTION: NORMAL
REJECTED TEST: NORMAL
SCAN OF BLOOD SMEAR: NORMAL
SEGMENTED NEUTROPHILS ABSOLUTE COUNT: 2 THOU/MM3 (ref 1.8–7.7)
SODIUM SERPL-SCNC: 140 MEQ/L (ref 135–145)
WBC # BLD AUTO: 3.1 THOU/MM3 (ref 4.8–10.8)

## 2024-04-05 PROCEDURE — 99232 SBSQ HOSP IP/OBS MODERATE 35: CPT | Performed by: INTERNAL MEDICINE

## 2024-04-05 PROCEDURE — 36415 COLL VENOUS BLD VENIPUNCTURE: CPT

## 2024-04-05 PROCEDURE — 80048 BASIC METABOLIC PNL TOTAL CA: CPT

## 2024-04-05 PROCEDURE — 2580000003 HC RX 258: Performed by: STUDENT IN AN ORGANIZED HEALTH CARE EDUCATION/TRAINING PROGRAM

## 2024-04-05 PROCEDURE — 6370000000 HC RX 637 (ALT 250 FOR IP): Performed by: INTERNAL MEDICINE

## 2024-04-05 PROCEDURE — 97161 PT EVAL LOW COMPLEX 20 MIN: CPT

## 2024-04-05 PROCEDURE — 6370000000 HC RX 637 (ALT 250 FOR IP): Performed by: STUDENT IN AN ORGANIZED HEALTH CARE EDUCATION/TRAINING PROGRAM

## 2024-04-05 PROCEDURE — 97116 GAIT TRAINING THERAPY: CPT

## 2024-04-05 PROCEDURE — 85025 COMPLETE CBC W/AUTO DIFF WBC: CPT

## 2024-04-05 PROCEDURE — 6360000002 HC RX W HCPCS: Performed by: HOSPITALIST

## 2024-04-05 PROCEDURE — 99239 HOSP IP/OBS DSCHRG MGMT >30: CPT | Performed by: HOSPITALIST

## 2024-04-05 PROCEDURE — 6370000000 HC RX 637 (ALT 250 FOR IP)

## 2024-04-05 PROCEDURE — 97165 OT EVAL LOW COMPLEX 30 MIN: CPT

## 2024-04-05 PROCEDURE — 97535 SELF CARE MNGMENT TRAINING: CPT

## 2024-04-05 PROCEDURE — 2T015 HOSPITALIST 2ND TOUCH: CPT | Performed by: HOSPITALIST

## 2024-04-05 RX ORDER — FOLIC ACID 1 MG/1
1 TABLET ORAL DAILY
Qty: 30 TABLET | Refills: 0 | Status: ON HOLD | OUTPATIENT
Start: 2024-04-05

## 2024-04-05 RX ORDER — FAMOTIDINE 20 MG/1
20 TABLET, FILM COATED ORAL DAILY
Qty: 60 TABLET | Refills: 0 | Status: ON HOLD | OUTPATIENT
Start: 2024-04-05

## 2024-04-05 RX ORDER — HYDRALAZINE HYDROCHLORIDE 100 MG/1
100 TABLET, FILM COATED ORAL EVERY 8 HOURS SCHEDULED
Qty: 90 TABLET | Refills: 0 | Status: ON HOLD | OUTPATIENT
Start: 2024-04-05

## 2024-04-05 RX ADMIN — Medication 240 MG: at 09:36

## 2024-04-05 RX ADMIN — DOXAZOSIN 4 MG: 4 TABLET ORAL at 09:35

## 2024-04-05 RX ADMIN — FAMOTIDINE 20 MG: 20 TABLET, FILM COATED ORAL at 09:36

## 2024-04-05 RX ADMIN — SEVELAMER CARBONATE 800 MG: 800 TABLET, FILM COATED ORAL at 13:40

## 2024-04-05 RX ADMIN — CLONIDINE HYDROCHLORIDE 0.3 MG: 0.2 TABLET ORAL at 13:40

## 2024-04-05 RX ADMIN — Medication 1000 UNITS: at 09:36

## 2024-04-05 RX ADMIN — CLONIDINE HYDROCHLORIDE 0.3 MG: 0.2 TABLET ORAL at 09:35

## 2024-04-05 RX ADMIN — HYDRALAZINE HYDROCHLORIDE 100 MG: 50 TABLET ORAL at 13:40

## 2024-04-05 RX ADMIN — Medication 240 MG: at 13:40

## 2024-04-05 RX ADMIN — AMLODIPINE BESYLATE 10 MG: 10 TABLET ORAL at 09:36

## 2024-04-05 RX ADMIN — SERTRALINE 100 MG: 100 TABLET, FILM COATED ORAL at 09:35

## 2024-04-05 RX ADMIN — HYDRALAZINE HYDROCHLORIDE 100 MG: 50 TABLET ORAL at 06:11

## 2024-04-05 RX ADMIN — CALCITRIOL CAPSULES 0.25 MCG 0.5 MCG: 0.25 CAPSULE ORAL at 09:38

## 2024-04-05 RX ADMIN — CINACALCET HYDROCHLORIDE 90 MG: 30 TABLET, FILM COATED ORAL at 09:35

## 2024-04-05 RX ADMIN — ASPIRIN 81 MG: 81 TABLET, COATED ORAL at 09:36

## 2024-04-05 RX ADMIN — FOLIC ACID 1 MG: 1 TABLET ORAL at 09:37

## 2024-04-05 RX ADMIN — GUAIFENESIN 600 MG: 600 TABLET, EXTENDED RELEASE ORAL at 09:36

## 2024-04-05 RX ADMIN — SEVELAMER CARBONATE 800 MG: 800 TABLET, FILM COATED ORAL at 09:38

## 2024-04-05 RX ADMIN — GUAIFENESIN AND DEXTROMETHORPHAN 5 ML: 100; 10 SYRUP ORAL at 03:56

## 2024-04-05 RX ADMIN — SODIUM CHLORIDE, PRESERVATIVE FREE 10 ML: 5 INJECTION INTRAVENOUS at 09:38

## 2024-04-05 RX ADMIN — Medication 1 TABLET: at 09:36

## 2024-04-05 ASSESSMENT — PAIN SCALES - GENERAL: PAINLEVEL_OUTOF10: 0

## 2024-04-05 ASSESSMENT — PAIN SCALES - WONG BAKER: WONGBAKER_NUMERICALRESPONSE: NO HURT

## 2024-04-05 NOTE — PROGRESS NOTES
Hospitalist Progress Note    Patient:  Trav Jennings      Unit/Bed:4A-04/004-A    YOB: 1965    MRN: [id number]       Acct: [id number]     PCP: Radha Hou APRN - CNP    Date of Admission: 4/2/2024    Assessment/Plan:    Hypertensive emergency: Secondary to noncompliance and drug abuse, positive for cocaine abuse.  BP is slowly improving, continue with amlodipine, clonidine as needed hydralazine.    End-stage renal disease: Appreciate nephrology input, patient still volume overloaded plan for additional dialysis today.  Hyperkalemia: HD again today.  Pulmonary edema: Secondary to hypertensive emergency, dialysis for fluid removal  Polysubstance abuse: Cessation advised.  Acute on chronic hypoxemic respiratory failure: Weaned down to his baseline 3 L nasal cannula now.  Repeat home O2 eval reveals patient still requires 3 L with ambulation, unfortunately unable to have oxygen delivered at this time due to patient's living situation.   aware  REZA:  History of AAA repair  DVT prophylaxis: Heparin  Disposition: Discharge planning for home with home O2 however unable to discharge back to his current residence while on oxygen.  Patient then requested placement to ECF however does not qualify per PT/OT notes.        Subjective (past 24 hours):   Patient seen and examined at bedside, waiting for ECF placement as he does not have a safe discharge plan.  Patient requires 3 L nasal cannula with exertion but has nowhere to go.    Medications:  Reviewed    Infusion Medications    nitroGLYCERIN 70 mcg/min (04/02/24 1701)    sodium chloride       Scheduled Medications    cinacalcet  90 mg Oral Once per day on Mon Wed Fri    sevelamer  800 mg Oral TID     heparin (porcine)  5,000 Units SubCUTAneous 3 times per day    sodium chloride flush  5-40 mL IntraVENous 2 times per day    aspirin  81 mg Oral Daily    atorvastatin  40 mg Oral Nightly    calcitRIOL  0.5 mcg Oral Once per day on Mon Wed Fri

## 2024-04-05 NOTE — PROGRESS NOTES
[facility]  INPATIENT PHYSICAL THERAPY  EVALUATION  [facility] 4A - 4A-04/004-A    Time In: 1112  Time Out: 1129  Timed Code Treatment Minutes: 9 Minutes  Minutes: 17          Date: 2024  Patient Name: Trav Jennings,  Gender:  male        MRN: [id number]  : 1965  (59 y.o.)      Referring Practitioner: Ezekiel Soliz MD  Diagnosis: shortness of breath  Additional Pertinent Hx: Per EMR \"Trav Jennings is a 60yo M w/significant PMHx of HTN, HLD, Severe REZA (non-compliant w/PAP, follow up), PTSD / Depression, Chronic Anemia (ACD / BRISEIDA), ESRD on HD (2/2 HTN / FSGS), Left WHITNEY w/Uncontrolled HTN, Known chronic Troponin elevation, Secondary HyperPTH, Polysubstance abuse (EtOH / Cocaine), Chronic Thrombocytopenia, AAA s/p repair, Obstructive CM (Echo 2023 revealed severe LVH, EF 65-70%, CASSIE, Peak LVOT gradient 89 mmHg), and 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 Competed HD , has not taken his home medications since then, endorses using Cocaine / EtOH, and instead of going to HD today he had the taxi bring him to the hospital. Pt started on Nitro gtt, given ASA, and Nephrology was contacted for HD. \"     Restrictions/Precautions:  Restrictions/Precautions: General Precautions, Fall Risk  Position Activity Restriction  Other position/activity restrictions: monitor O2    Subjective:  Chart Reviewed: Yes  Patient assessed for rehabilitation services?: Yes  Family / Caregiver Present: No  Subjective: OK to see pt per nursing. Pt in bed when PT arrived, just recieved lunch, wife present and supportive. Pt required mod encouragement to complete PT session. Pt refused gait belt. Pt was asked about use of walker or cane at home. Pt denies, reports he feels like he may need one, however when pt began amb, refused to use one. Pt also slightly argumentive during session, questioning why he needs therapy and to walk as he walked  yesterday.    General:  Overall Orientation Status: Within Normal Limits  Orientation Level: Oriented X4  Vision: Within Functional Limits  Hearing: Within functional limits       Pain: denies    Vitals: Oxygen: 3 L of O2, 97-98% following session    Social/Functional History:    Lives With: Other (comment) (friend)  Type of Home: House  Home Layout: Two level, Bed/Bath upstairs  Home Access: Stairs to enter without rails  Entrance Stairs - Number of Steps: 2-3 ANATOLIY  Home Equipment: Oxygen (2 liters through [facility])             ADL Assistance: Independent  Homemaking Assistance: Independent  Ambulation Assistance: Independent  Transfer Assistance: Independent    Active : No     Additional Comments: IND, reports he wants to go to a SNF for rehab \"to get off the oxygen\"    OBJECTIVE:  Range of Motion:  Bilateral Lower Extremity: WNL    Strength:  Bilateral Lower Extremity: WNL    Balance:  Static Sitting Balance:  Modified Independent  Dynamic Sitting Balance: Supervision  Static Standing Balance: Stand By Assistance, Contact Guard Assistance  Initially required CGA due to slight unsteadiness, progressed to SBA in standing  Bed Mobility:  Supine to Sit: Modified Independent    Transfers:  Sit to Stand: Stand By Assistance, X 1, with verbal cues  Stand to Sit:Stand By Assistance, X 1, with verbal cues  No LOB, slow transitions, initially  Ambulation:  Stand By Assistance, Contact Guard Assistance, X 1, with cues for safety, with verbal cues , with increased time for completion  Distance: 220 feet  Surface: Level Tile  Device:No Device  Gait Deviations:  Slow Janina, Decreased Step Length Bilaterally, Decreased Gait Speed, Wide Base of Support, and Unsteady Gait  Pt with unsteadiness initially with gait for about first 50 feet required CGA, recommended RW, pt declined. Pt then progressed to SBA as pt more steady on feet with further distances.     Functional Outcome Measures: Completed  AM-PAC Inpatient Mobility Raw

## 2024-04-05 NOTE — DISCHARGE SUMMARY
MEDICAL HISTORY:  No relevant medical history has been documented for this patient. FAMILY HISTORY:   No relevant family history has been documented for this patient. RISK VALUES: Tyrer-Cuzick 10yr.: NA%, Tyrer-Cuzick life:   NA% COMPARISON: There are no prior exams for comparison. MAMMOGRAM: TECHNIQUE: Bilateral CC and MLO views were obtained each breast. Tomosynthesis was used. CAD was used. BREAST COMPOSITION: There are scattered areas of fibroglandular density. FINDINGS: There is a nodular density demonstrated within the lower inner right breast at the area of palpable concern. Further evaluation with targeted right breast ultrasound is reported below. BREAST ULTRASOUND: TECHNIQUE: Targeted ultrasound of the right breast was performed. Grayscale images and color images of the real-time examination were reviewed. The axilla was also imaged. FINDINGS: There is a 2.1 x 1.3 x 1.7 cm lesion demonstrating mixed echogenicity within the lower inner right breast with central hypoechogenicity. There are internal echoes within the hypoechoic portion. Differential considerations would include a possible abscess  versus mass. A hematoma could also have this appearance. Further evaluation with ultrasound-guided aspiration versus biopsy is recommended. This was performed same day and is reported separately. There are no sonographic abnormalities in the axilla. A normal lymph node is seen. IMPRESSION: 1. There is a lesion demonstrating mixed echogenicity within the lower inner right breast with central hypoechogenicity. There are internal echoes within the hypoechoic portion. Differential considerations would include a possible abscess versus mass. A hematoma could also have this appearance. This correlates with the palpable area of concern and the mammographic finding. Further evaluation with ultrasound-guided aspiration versus biopsy is recommended. This was performed same day and is reported separately. An ultrasound-guided

## 2024-04-05 NOTE — PROGRESS NOTES
[facility]   Nephrology progress note  4/5/2024, 9:06 AM      Pt Name:    Trav Jennings  MRN:     [id number]     YOB: 1965  Admit Date:    4/2/2024  4:15 AM    Chief Complaint: Nephrology following for ESRD.    Subjective:  Patient was seen and examined this morning  Doing ok, no overnight events.  Bps stable.    Objective:  24HR INTAKE/OUTPUT:    Intake/Output Summary (Last 24 hours) at 4/5/2024 0906  Last data filed at 4/4/2024 2320  Gross per 24 hour   Intake 1160 ml   Output 1400 ml   Net -240 ml         I/O last 3 completed shifts:  In: 1160 [P.O.:760]  Out: 1400   No intake/output data recorded.   Admission weight: 97.6 kg (215 lb 2.7 oz)  Wt Readings from Last 3 Encounters:   04/05/24 96.2 kg (212 lb 1.3 oz)   03/10/24 93.9 kg (207 lb)   03/07/24 94 kg (207 lb 3.7 oz)        Vitals :   Vitals:    04/04/24 1654 04/04/24 2030 04/04/24 2320 04/05/24 0600   BP: (!) 171/68 (!) 146/62 135/80    Pulse: 91 89 82    Resp: 16 16 18    Temp: 97.8 °F (36.6 °C) 98.2 °F (36.8 °C) 97.5 °F (36.4 °C)    TempSrc: Oral Oral Oral    SpO2: 98% 100% 100%    Weight:    96.2 kg (212 lb 1.3 oz)   Height:           Physical examination  General Appearance: alert and cooperative with exam, appears comfortable, no distress  Mouth/Throat: Oral mucosa moist  Neck: No JVD  Lungs: diminished  Heart:  S1, S2 heard  GI: soft, non-tender, no guarding  Extremities: no significant edema    Medications:  Infusion:    nitroGLYCERIN 70 mcg/min (04/02/24 1701)    sodium chloride       Meds:    cinacalcet  90 mg Oral Once per day on Mon Wed Fri    sevelamer  800 mg Oral TID WC    heparin (porcine)  5,000 Units SubCUTAneous 3 times per day    sodium chloride flush  5-40 mL IntraVENous 2 times per day    aspirin  81 mg Oral Daily    atorvastatin  40 mg Oral Nightly    calcitRIOL  0.5 mcg Oral Once per day on Mon Wed Fri    famotidine  20 mg Oral Daily    ferrous gluconate  240 mg Oral TID WC    folic acid  1 mg Oral  Daily    sertraline  100 mg Oral Daily    Vitamin D  1,000 Units Oral Daily    multivitamin  1 tablet Oral Daily    hydrALAZINE  100 mg Oral 3 times per day    amLODIPine  10 mg Oral Daily    cloNIDine  0.3 mg Oral TID    doxazosin  4 mg Oral BID    guaiFENesin  600 mg Oral BID     Meds prn: simethicone, sodium chloride flush, sodium chloride, ondansetron **OR** ondansetron, polyethylene glycol, acetaminophen **OR** acetaminophen, albuterol sulfate HFA, guaiFENesin-dextromethorphan, benzonatate     Lab Data :  CBC:   Recent Labs     04/03/24  0320 04/04/24  0450 04/05/24  0440   WBC 4.7* 3.9* 3.1*   HGB 9.7* 9.2* 9.7*   HCT 32.4* 30.3* 32.2*   * 99* 97*     CMP:  Recent Labs     04/03/24  0743 04/04/24  0450 04/05/24  0813    138 140   K 5.8* 5.1 5.2    102 102   CO2 18* 24 24   BUN 69* 42* 32*   CREATININE 8.8* 6.2* 5.4*   GLUCOSE 182* 164* 153*   CALCIUM 7.9* 6.7* 7.5*   PHOS  --  4.6  --      Hepatic:   No results for input(s): \"LABALBU\", \"AST\", \"ALT\", \"ALB\", \"BILITOT\", \"ALKPHOS\" in the last 72 hours.        Assessment and Plan:  ESRD on HD TTS    Hypertensive emergency due to noncompliance with meds and cocaine use: better  Pulmonary edema due to HTN; improved  Hyperkalemia: improved  Hypocalcemia:improved. Sensipar dose reduced  Mild metabolic acidosis, improved  Macrocytic anemia  Polysubstance abuse  Secondary hyperparathyroidism, on calcitriol, sensipar, phos binder.     D/W patient     Hermila E Forrest,   [facility]    This report has been created using voice recognition software. It may contain minor errors which are inherent in voice recognition technology

## 2024-04-05 NOTE — CARE COORDINATION
4/5/24, 1:53 PM EDT    DISCHARGE ON GOING EVALUATION    Trav [facility] day: 3  Location: 4A-04/004-A Reason for admit: Shortness of breath [R06.02]  ESRD (end stage renal disease) on dialysis (HCC) [N18.6, Z99.2]  Hypertensive emergency [I16.1]  Acute on chronic respiratory failure with hypoxia (HCC) [J96.21]   Procedure:   4/2 CXR 1. Question congestive failure/fluid overload. Stable cardiomegaly with   pulmonary vascular prominence. Increased central and lower lobe   predominant interstitial opacities may reflect pulmonary edema.   2. Chronically elevated right hemidiaphragm with small right basilar   pleural effusion and/or chronic right basilar pleural thickening.       Barriers to Discharge: Creatinine 5.4 [facility]   PCP: Radha Hou APRN - CNP  Readmission Risk Score: 33.9%  Patient Goals/Plan/Treatment Preferences: Spent 30 minutes with  discussing where pt will go at discharge. Pt states he is homeless. Homeless shelter information provided as well as sober living options provided. Encouraged pt to reach out to the shelters. Spoke with Edu from [facility]. [facility] to deliver a smaller portable oxygen unit to room prior to discharge. Updated Dr. Soliz as well as Ana Laura HAQUE.

## 2024-04-05 NOTE — PROGRESS NOTES
Discharge instructions reviewed with patient. All questions answered. Pt had empty home med prescriptions. Dr. Soliz aware and ordering. Pt discharged with all belongings.

## 2024-04-05 NOTE — PROGRESS NOTES
CLINICAL PHARMACY: DISCHARGE MED RECONCILIATION/REVIEW    [facility] Select Patient?: No  Total # of Interventions Recommended: 0   -   Total # Interventions Accepted: 0  Intervention Severity:   - Level 1 Intervention Present?: No   - Level 2 #: 0   - Level 3 #: 0   Time Spent (min): Thang Hou PharmDAVID 4/5/2024 12:45 PM

## 2024-04-05 NOTE — PROGRESS NOTES
[facility]  INPATIENT OCCUPATIONAL THERAPY  [facility] 4A  EVALUATION    Time:   Time In: 1224  Time Out: 1303  Timed Code Treatment Minutes: 24 Minutes  Minutes: 39          Date: 2024  Patient Name: Trav Jennings,   Gender: male      MRN: [id number]  : 1965  (59 y.o.)  Referring Practitioner: Dr. 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 Competed HD , has not taken his home medications since then, endorses using Cocaine / EtOH, and instead of going to HD today he had the taxi bring him to the hospital. Pt started on Nitro gtt, given ASA, and Nephrology was contacted for HD.    Restrictions/Precautions:  Restrictions/Precautions: General Precautions, Fall Risk  Position Activity Restriction  Other position/activity restrictions: monitor O2    Subjective  Chart Reviewed: Yes  Patient assessed for rehabilitation services?: Yes    Subjective: Pleasant and cooperative.  Comments: RN approved session.  Pt did not report having pain only fatigue.    Pain: None reported.  Pt reported having a lot of fatigue but did not complain of pain.     Vitals: Vitals not assessed per clinical judgement, see nursing flowsheet    Social/Functional History:  Lives With: Friend(s)  Type of Home: House  Home Layout: Two level, Bed/Bath upstairs  Home Access: Stairs to enter without rails  Entrance Stairs - Number of Steps: 2-3 ANATOLIY  Home Equipment: Oxygen (2 liters through [facility])   Bathroom  exercises with proper breathing.  See long-term goal time frame for expected duration of plan of care.  If no long-term goals established, a short length of stay is anticipated.    Goals:  Patient goals : \"I want to get off of the O2 so I can return to being more active.\" pt states.  Short Term Goals  Time Frame for Short Term Goals: By discharge  Short Term Goal 1: Pt will complete a shower while using a shower chair with no > than MIN A and cues for safety to increase his independence with self care.  Short Term Goal 2: Pt will complete simple ADLs while standing for over 3 minute duration with cues for pursed lip breathing to increase his activity tolerance for ease of doing self care routine or functional mobility.  Short Term Goal 3: Pt will demonstrate functional mobility walking to/from the shower room with SBA while using any AD needed and maintaining O2 saturation > than 90% to prepare for doing his morning routine or going outside to get a ride to hemodialysis.  Short Term Goal 4: Pt will complete BUE ROM/moderate resistance exercises while following proper breathing technique to increase his endurance and strength for ease of doing ADLs or helping with any homemaking tasks.  Additional Goals?: No    AM-PAC Inpatient Daily Activity Raw Score: 21  AM-PAC Inpatient ADL T-Scale Score : 44.27    Following session, patient left in safe position with all fall risk precautions in place.

## 2024-04-05 NOTE — PLAN OF CARE
Problem: Discharge Planning  Goal: Discharge to home or other facility with appropriate resources  Outcome: Progressing  Flowsheets (Taken 4/5/2024 0559)  Discharge to home or other facility with appropriate resources:   Identify barriers to discharge with patient and caregiver   Arrange for needed discharge resources and transportation as appropriate   Identify discharge learning needs (meds, wound care, etc)   Refer to discharge planning if patient needs post-hospital services based on physician order or complex needs related to functional status, cognitive ability or social support system     Problem: Safety - Adult  Goal: Free from fall injury  Outcome: Progressing  Flowsheets (Taken 4/5/2024 0559)  Free From Fall Injury: Instruct family/caregiver on patient safety     Problem: Chronic Conditions and Co-morbidities  Goal: Patient's chronic conditions and co-morbidity symptoms are monitored and maintained or improved  Outcome: Progressing  Flowsheets (Taken 4/5/2024 0559)  Care Plan - Patient's Chronic Conditions and Co-Morbidity Symptoms are Monitored and Maintained or Improved:   Monitor and assess patient's chronic conditions and comorbid symptoms for stability, deterioration, or improvement   Collaborate with multidisciplinary team to address chronic and comorbid conditions and prevent exacerbation or deterioration   Update acute care plan with appropriate goals if chronic or comorbid symptoms are exacerbated and prevent overall improvement and discharge     Problem: Respiratory - Adult  Goal: Achieves optimal ventilation and oxygenation  Outcome: Progressing  Flowsheets (Taken 4/5/2024 0559)  Achieves optimal ventilation and oxygenation:   Assess for changes in mentation and behavior   Assess for changes in respiratory status   Position to facilitate oxygenation and minimize respiratory effort   Oxygen supplementation based on oxygen saturation or arterial blood gases   Encourage broncho-pulmonary hygiene  including cough, deep breathe, incentive spirometry   Assess and instruct to report shortness of breath or any respiratory difficulty   Respiratory therapy support as indicated     Problem: Metabolic/Fluid and Electrolytes - Adult  Goal: Electrolytes maintained within normal limits  Outcome: Progressing  Flowsheets (Taken 4/5/2024 7599)  Electrolytes maintained within normal limits:   Monitor labs and assess patient for signs and symptoms of electrolyte imbalances   Administer electrolyte replacement as ordered   Monitor response to electrolyte replacements, including repeat lab results as appropriate   Care plan reviewed with patient and family.  Patient and family verbalize understanding of the plan of care and contribute to goal setting.

## 2024-04-06 ENCOUNTER — APPOINTMENT (OUTPATIENT)
Dept: GENERAL RADIOLOGY | Age: 59
End: 2024-04-06
Payer: MEDICARE

## 2024-04-06 ENCOUNTER — HOSPITAL ENCOUNTER (INPATIENT)
Age: 59
LOS: 2 days | Discharge: PSYCHIATRIC OR SUBSTANCE USE FACILITY | End: 2024-04-08
Attending: EMERGENCY MEDICINE | Admitting: INTERNAL MEDICINE
Payer: MEDICARE

## 2024-04-06 DIAGNOSIS — F14.90 COCAINE USE: ICD-10-CM

## 2024-04-06 DIAGNOSIS — R45.851 SUICIDAL IDEATION: Primary | ICD-10-CM

## 2024-04-06 DIAGNOSIS — N18.6 ESRD (END STAGE RENAL DISEASE) ON DIALYSIS (HCC): ICD-10-CM

## 2024-04-06 DIAGNOSIS — E87.5 HYPERKALEMIA: ICD-10-CM

## 2024-04-06 DIAGNOSIS — I16.0 HYPERTENSIVE URGENCY: ICD-10-CM

## 2024-04-06 DIAGNOSIS — Z99.2 ESRD (END STAGE RENAL DISEASE) ON DIALYSIS (HCC): ICD-10-CM

## 2024-04-06 LAB
ALBUMIN SERPL BCG-MCNC: 4 G/DL (ref 3.5–5.1)
ALP SERPL-CCNC: 134 U/L (ref 38–126)
ALT SERPL W/O P-5'-P-CCNC: 10 U/L (ref 11–66)
ANION GAP SERPL CALC-SCNC: 16 MEQ/L (ref 8–16)
AST SERPL-CCNC: 14 U/L (ref 5–40)
BASOPHILS ABSOLUTE: 0 THOU/MM3 (ref 0–0.1)
BASOPHILS NFR BLD AUTO: 0.3 %
BILIRUB SERPL-MCNC: 0.3 MG/DL (ref 0.3–1.2)
BUN SERPL-MCNC: 60 MG/DL (ref 7–22)
CA-I BLD ISE-SCNC: 1.08 MMOL/L (ref 1.12–1.32)
CALCIUM SERPL-MCNC: 8.4 MG/DL (ref 8.5–10.5)
CHLORIDE SERPL-SCNC: 99 MEQ/L (ref 98–111)
CO2 SERPL-SCNC: 22 MEQ/L (ref 23–33)
CREAT SERPL-MCNC: 8.2 MG/DL (ref 0.4–1.2)
DEPRECATED RDW RBC AUTO: 55.9 FL (ref 35–45)
EOSINOPHIL NFR BLD AUTO: 0.5 %
EOSINOPHILS ABSOLUTE: 0 THOU/MM3 (ref 0–0.4)
ERYTHROCYTE [DISTWIDTH] IN BLOOD BY AUTOMATED COUNT: 14.2 % (ref 11.5–14.5)
ETHANOL SERPL-MCNC: < 0.01 %
GFR SERPL CREATININE-BSD FRML MDRD: 7 ML/MIN/1.73M2
GLUCOSE SERPL-MCNC: 87 MG/DL (ref 70–108)
HCT VFR BLD AUTO: 32.7 % (ref 42–52)
HGB BLD-MCNC: 10.2 GM/DL (ref 14–18)
IMM GRANULOCYTES # BLD AUTO: 0.02 THOU/MM3 (ref 0–0.07)
IMM GRANULOCYTES NFR BLD AUTO: 0.3 %
LYMPHOCYTES ABSOLUTE: 0.5 THOU/MM3 (ref 1–4.8)
LYMPHOCYTES NFR BLD AUTO: 8.4 %
MAGNESIUM SERPL-MCNC: 2.5 MG/DL (ref 1.6–2.4)
MCH RBC QN AUTO: 33.6 PG (ref 26–33)
MCHC RBC AUTO-ENTMCNC: 31.2 GM/DL (ref 32.2–35.5)
MCV RBC AUTO: 107.6 FL (ref 80–94)
MONOCYTES ABSOLUTE: 0.4 THOU/MM3 (ref 0.4–1.3)
MONOCYTES NFR BLD AUTO: 6.2 %
NEUTROPHILS NFR BLD AUTO: 84.3 %
NRBC BLD AUTO-RTO: 0 /100 WBC
NT-PROBNP SERPL IA-MCNC: ABNORMAL PG/ML (ref 0–124)
OSMOLALITY SERPL CALC.SUM OF ELEC: 290.1 MOSMOL/KG (ref 275–300)
PHOSPHATE SERPL-MCNC: 4.7 MG/DL (ref 2.4–4.7)
PLATELET # BLD AUTO: 106 THOU/MM3 (ref 130–400)
PMV BLD AUTO: 9.7 FL (ref 9.4–12.4)
POTASSIUM SERPL-SCNC: 5.8 MEQ/L (ref 3.5–5.2)
PROT SERPL-MCNC: 7.4 G/DL (ref 6.1–8)
RBC # BLD AUTO: 3.04 MILL/MM3 (ref 4.7–6.1)
SEGMENTED NEUTROPHILS ABSOLUTE COUNT: 5.3 THOU/MM3 (ref 1.8–7.7)
SODIUM SERPL-SCNC: 137 MEQ/L (ref 135–145)
TROPONIN, HIGH SENSITIVITY: 104 NG/L (ref 0–12)
WBC # BLD AUTO: 6.3 THOU/MM3 (ref 4.8–10.8)

## 2024-04-06 PROCEDURE — 6360000002 HC RX W HCPCS: Performed by: STUDENT IN AN ORGANIZED HEALTH CARE EDUCATION/TRAINING PROGRAM

## 2024-04-06 PROCEDURE — 84100 ASSAY OF PHOSPHORUS: CPT

## 2024-04-06 PROCEDURE — 99222 1ST HOSP IP/OBS MODERATE 55: CPT | Performed by: INTERNAL MEDICINE

## 2024-04-06 PROCEDURE — 82077 ASSAY SPEC XCP UR&BREATH IA: CPT

## 2024-04-06 PROCEDURE — 83735 ASSAY OF MAGNESIUM: CPT

## 2024-04-06 PROCEDURE — 5A1D70Z PERFORMANCE OF URINARY FILTRATION, INTERMITTENT, LESS THAN 6 HOURS PER DAY: ICD-10-PCS | Performed by: STUDENT IN AN ORGANIZED HEALTH CARE EDUCATION/TRAINING PROGRAM

## 2024-04-06 PROCEDURE — 71045 X-RAY EXAM CHEST 1 VIEW: CPT

## 2024-04-06 PROCEDURE — 82330 ASSAY OF CALCIUM: CPT

## 2024-04-06 PROCEDURE — 96365 THER/PROPH/DIAG IV INF INIT: CPT

## 2024-04-06 PROCEDURE — 96366 THER/PROPH/DIAG IV INF ADDON: CPT

## 2024-04-06 PROCEDURE — 83880 ASSAY OF NATRIURETIC PEPTIDE: CPT

## 2024-04-06 PROCEDURE — 36415 COLL VENOUS BLD VENIPUNCTURE: CPT

## 2024-04-06 PROCEDURE — 99285 EMERGENCY DEPT VISIT HI MDM: CPT

## 2024-04-06 PROCEDURE — 80053 COMPREHEN METABOLIC PANEL: CPT

## 2024-04-06 PROCEDURE — 85025 COMPLETE CBC W/AUTO DIFF WBC: CPT

## 2024-04-06 PROCEDURE — 2580000003 HC RX 258: Performed by: STUDENT IN AN ORGANIZED HEALTH CARE EDUCATION/TRAINING PROGRAM

## 2024-04-06 PROCEDURE — 90935 HEMODIALYSIS ONE EVALUATION: CPT

## 2024-04-06 PROCEDURE — 2140000000 HC CCU INTERMEDIATE R&B

## 2024-04-06 PROCEDURE — 84484 ASSAY OF TROPONIN QUANT: CPT

## 2024-04-06 PROCEDURE — 90935 HEMODIALYSIS ONE EVALUATION: CPT | Performed by: INTERNAL MEDICINE

## 2024-04-06 PROCEDURE — 93005 ELECTROCARDIOGRAM TRACING: CPT | Performed by: STUDENT IN AN ORGANIZED HEALTH CARE EDUCATION/TRAINING PROGRAM

## 2024-04-06 PROCEDURE — 6370000000 HC RX 637 (ALT 250 FOR IP): Performed by: STUDENT IN AN ORGANIZED HEALTH CARE EDUCATION/TRAINING PROGRAM

## 2024-04-06 RX ORDER — ONDANSETRON 2 MG/ML
4 INJECTION INTRAMUSCULAR; INTRAVENOUS EVERY 6 HOURS PRN
Status: DISCONTINUED | OUTPATIENT
Start: 2024-04-06 | End: 2024-04-08 | Stop reason: HOSPADM

## 2024-04-06 RX ORDER — ACETAMINOPHEN 650 MG/1
650 SUPPOSITORY RECTAL EVERY 6 HOURS PRN
Status: DISCONTINUED | OUTPATIENT
Start: 2024-04-06 | End: 2024-04-08 | Stop reason: HOSPADM

## 2024-04-06 RX ORDER — SODIUM CHLORIDE 9 MG/ML
INJECTION, SOLUTION INTRAVENOUS PRN
Status: DISCONTINUED | OUTPATIENT
Start: 2024-04-06 | End: 2024-04-08 | Stop reason: HOSPADM

## 2024-04-06 RX ORDER — CALCIUM GLUCONATE 10 MG/ML
1000 INJECTION, SOLUTION INTRAVENOUS ONCE
Status: DISCONTINUED | OUTPATIENT
Start: 2024-04-06 | End: 2024-04-07

## 2024-04-06 RX ORDER — DOXAZOSIN MESYLATE 4 MG/1
4 TABLET ORAL NIGHTLY
Status: DISCONTINUED | OUTPATIENT
Start: 2024-04-06 | End: 2024-04-07

## 2024-04-06 RX ORDER — SODIUM CHLORIDE 0.9 % (FLUSH) 0.9 %
5-40 SYRINGE (ML) INJECTION EVERY 12 HOURS SCHEDULED
Status: DISCONTINUED | OUTPATIENT
Start: 2024-04-06 | End: 2024-04-08 | Stop reason: HOSPADM

## 2024-04-06 RX ORDER — ACETAMINOPHEN 325 MG/1
650 TABLET ORAL EVERY 6 HOURS PRN
Status: DISCONTINUED | OUTPATIENT
Start: 2024-04-06 | End: 2024-04-08 | Stop reason: HOSPADM

## 2024-04-06 RX ORDER — ONDANSETRON 4 MG/1
4 TABLET, ORALLY DISINTEGRATING ORAL EVERY 8 HOURS PRN
Status: DISCONTINUED | OUTPATIENT
Start: 2024-04-06 | End: 2024-04-08 | Stop reason: HOSPADM

## 2024-04-06 RX ORDER — ALBUTEROL SULFATE 90 UG/1
2 AEROSOL, METERED RESPIRATORY (INHALATION) EVERY 6 HOURS PRN
Status: DISCONTINUED | OUTPATIENT
Start: 2024-04-06 | End: 2024-04-08 | Stop reason: HOSPADM

## 2024-04-06 RX ORDER — CINACALCET 30 MG/1
150 TABLET, FILM COATED ORAL
Status: DISCONTINUED | OUTPATIENT
Start: 2024-04-08 | End: 2024-04-07 | Stop reason: DRUGHIGH

## 2024-04-06 RX ORDER — NITROGLYCERIN 20 MG/100ML
5-200 INJECTION INTRAVENOUS CONTINUOUS
Status: DISCONTINUED | OUTPATIENT
Start: 2024-04-06 | End: 2024-04-07

## 2024-04-06 RX ORDER — ISOSORBIDE MONONITRATE 60 MG/1
120 TABLET, EXTENDED RELEASE ORAL 2 TIMES DAILY
Status: DISCONTINUED | OUTPATIENT
Start: 2024-04-06 | End: 2024-04-07

## 2024-04-06 RX ORDER — QUINIDINE GLUCONATE 324 MG
324 TABLET, EXTENDED RELEASE ORAL
Status: DISCONTINUED | OUTPATIENT
Start: 2024-04-06 | End: 2024-04-08 | Stop reason: HOSPADM

## 2024-04-06 RX ORDER — POLYETHYLENE GLYCOL 3350 17 G/17G
17 POWDER, FOR SOLUTION ORAL DAILY PRN
Status: DISCONTINUED | OUTPATIENT
Start: 2024-04-06 | End: 2024-04-08 | Stop reason: HOSPADM

## 2024-04-06 RX ORDER — FLUTICASONE PROPIONATE 50 MCG
1 SPRAY, SUSPENSION (ML) NASAL DAILY
Status: DISCONTINUED | OUTPATIENT
Start: 2024-04-07 | End: 2024-04-08 | Stop reason: HOSPADM

## 2024-04-06 RX ORDER — FAMOTIDINE 20 MG/1
20 TABLET, FILM COATED ORAL DAILY
Status: DISCONTINUED | OUTPATIENT
Start: 2024-04-07 | End: 2024-04-08 | Stop reason: HOSPADM

## 2024-04-06 RX ORDER — VITAMIN B COMPLEX
1000 TABLET ORAL DAILY
Status: DISCONTINUED | OUTPATIENT
Start: 2024-04-06 | End: 2024-04-08 | Stop reason: HOSPADM

## 2024-04-06 RX ORDER — AMLODIPINE BESYLATE 10 MG/1
10 TABLET ORAL DAILY
Status: DISCONTINUED | OUTPATIENT
Start: 2024-04-06 | End: 2024-04-08 | Stop reason: HOSPADM

## 2024-04-06 RX ORDER — CALCITRIOL 0.25 UG/1
0.5 CAPSULE, LIQUID FILLED ORAL
Status: DISCONTINUED | OUTPATIENT
Start: 2024-04-08 | End: 2024-04-08 | Stop reason: HOSPADM

## 2024-04-06 RX ORDER — SERTRALINE HYDROCHLORIDE 100 MG/1
100 TABLET, FILM COATED ORAL DAILY
Status: DISCONTINUED | OUTPATIENT
Start: 2024-04-06 | End: 2024-04-08 | Stop reason: HOSPADM

## 2024-04-06 RX ORDER — ATORVASTATIN CALCIUM 40 MG/1
40 TABLET, FILM COATED ORAL NIGHTLY
Status: DISCONTINUED | OUTPATIENT
Start: 2024-04-06 | End: 2024-04-08 | Stop reason: HOSPADM

## 2024-04-06 RX ORDER — TRAZODONE HYDROCHLORIDE 50 MG/1
50 TABLET ORAL NIGHTLY PRN
Status: DISCONTINUED | OUTPATIENT
Start: 2024-04-06 | End: 2024-04-08 | Stop reason: HOSPADM

## 2024-04-06 RX ORDER — FOLIC ACID 1 MG/1
1 TABLET ORAL DAILY
Status: DISCONTINUED | OUTPATIENT
Start: 2024-04-06 | End: 2024-04-08 | Stop reason: HOSPADM

## 2024-04-06 RX ORDER — VITAMIN E 268 MG
400 CAPSULE ORAL DAILY
Status: DISCONTINUED | OUTPATIENT
Start: 2024-04-07 | End: 2024-04-08 | Stop reason: HOSPADM

## 2024-04-06 RX ORDER — ASPIRIN 81 MG/1
81 TABLET ORAL DAILY
Status: DISCONTINUED | OUTPATIENT
Start: 2024-04-06 | End: 2024-04-08 | Stop reason: HOSPADM

## 2024-04-06 RX ORDER — SODIUM CHLORIDE 0.9 % (FLUSH) 0.9 %
10 SYRINGE (ML) INJECTION PRN
Status: DISCONTINUED | OUTPATIENT
Start: 2024-04-06 | End: 2024-04-08 | Stop reason: HOSPADM

## 2024-04-06 RX ORDER — HYDRALAZINE HYDROCHLORIDE 50 MG/1
100 TABLET, FILM COATED ORAL EVERY 8 HOURS
Status: DISCONTINUED | OUTPATIENT
Start: 2024-04-07 | End: 2024-04-08 | Stop reason: HOSPADM

## 2024-04-06 RX ADMIN — AMLODIPINE BESYLATE 10 MG: 10 TABLET ORAL at 21:14

## 2024-04-06 RX ADMIN — Medication 1000 UNITS: at 22:29

## 2024-04-06 RX ADMIN — SODIUM CHLORIDE, PRESERVATIVE FREE 10 ML: 5 INJECTION INTRAVENOUS at 21:08

## 2024-04-06 RX ADMIN — ASPIRIN 81 MG: 81 TABLET, COATED ORAL at 21:15

## 2024-04-06 RX ADMIN — SODIUM CHLORIDE 10 MG/HR: 9 INJECTION, SOLUTION INTRAVENOUS at 19:11

## 2024-04-06 RX ADMIN — NITROGLYCERIN 5 MCG/MIN: 20 INJECTION INTRAVENOUS at 13:29

## 2024-04-06 RX ADMIN — DOXAZOSIN MESYLATE 4 MG: 4 TABLET ORAL at 21:16

## 2024-04-06 RX ADMIN — Medication 360 MG: at 22:29

## 2024-04-06 RX ADMIN — ATORVASTATIN CALCIUM 40 MG: 40 TABLET, FILM COATED ORAL at 21:15

## 2024-04-06 RX ADMIN — SODIUM CHLORIDE 2.5 MG/HR: 9 INJECTION, SOLUTION INTRAVENOUS at 20:54

## 2024-04-06 RX ADMIN — FOLIC ACID 1 MG: 1 TABLET ORAL at 22:28

## 2024-04-06 RX ADMIN — HYDRALAZINE HYDROCHLORIDE 100 MG: 50 TABLET ORAL at 23:56

## 2024-04-06 RX ADMIN — SERTRALINE 100 MG: 100 TABLET, FILM COATED ORAL at 22:28

## 2024-04-06 RX ADMIN — SODIUM CHLORIDE 5 MG/HR: 9 INJECTION, SOLUTION INTRAVENOUS at 15:36

## 2024-04-06 RX ADMIN — CLONIDINE HYDROCHLORIDE 0.3 MG: 0.2 TABLET ORAL at 21:16

## 2024-04-06 ASSESSMENT — PAIN - FUNCTIONAL ASSESSMENT
PAIN_FUNCTIONAL_ASSESSMENT: NONE - DENIES PAIN
PAIN_FUNCTIONAL_ASSESSMENT: NONE - DENIES PAIN

## 2024-04-06 ASSESSMENT — LIFESTYLE VARIABLES
HOW OFTEN DO YOU HAVE A DRINK CONTAINING ALCOHOL: 2-4 TIMES A MONTH
HOW MANY STANDARD DRINKS CONTAINING ALCOHOL DO YOU HAVE ON A TYPICAL DAY: 3 OR 4

## 2024-04-06 ASSESSMENT — PAIN SCALES - GENERAL
PAINLEVEL_OUTOF10: 0
PAINLEVEL_OUTOF10: 0

## 2024-04-06 NOTE — CONSULTS
Patient seen and examined in the dialysis unit  Seen during dialysis treatment  Tolerating hemodialysis treatment well  Currently on Cardene and nitroglycerin drip  Clinically fluid overloaded  Vitals noted  Blood pressure slowly improving, down to 180 systolic  Ultrafiltration 3.4 kg  Continue with dialysis  Full consult note to follow  Discussed with HD GARLAND Brown MD

## 2024-04-06 NOTE — ED TRIAGE NOTES
Pt to the ED through triage, when asked the patient what is going on he stated \"my life is in shambles.\" Pt states he has been homeless since October and has nowhere to go. Pt reports intermittently staying with a friend. Pt states he does have thoughts of killing himself and reports a plan of \"slitting his wrists and laying in the bathtub.\" He states he also feels paranoid that other people want to hurt him. Pt reports doing cocaine this morning also but wants to get clean. Pt is tearful throughout triage questions and states he \"just wants someone to help him.\" Pt on 3L NC at all times and is on Dialysis 3x/week. Pt reports missing Dialysis this morning. Level A paged. Sitter at bedside for continuous observation.

## 2024-04-06 NOTE — CONSULTS
Hypertension     Left renal artery stenosis (HCC) 5/22/2014    Monoclonal (M) protein disease, multiple 'M' protein     Nicotine dependence 6/16/2014    Noncompliance     Pneumonia     Psychiatric problem     PTSD (post-traumatic stress disorder)     Pt vet from [place]    Secondary hyperparathyroidism (of renal origin)     Sleep apnea        Past Surgical History:  Past Surgical History:   Procedure Laterality Date    ABDOMINAL AORTIC ANEURYSM REPAIR      BRONCHOSCOPY N/A 10/6/2022    BRONCHOSCOPY performed by Theodore Ng MD at [facility]    DIALYSIS FISTULA CREATION Left 07/08/2016    EKG 12-LEAD  9/24/2015         HAND TENDON SURGERY Left 4/5/2019    LEFT THUMB FLEXOR TENDON REPAIR performed by Edu Cervantes MD at [facility] OR    LEG TENDON SURGERY      [facility] US GUID NDL BIOPSY RIGHT Right 3/18/2024    [facility] US GUID NDL BIOPSY RIGHT 3/18/2024 Michele Lee MD [facility]    THORACOTOMY Right 10/10/2022    Right Thoracotomy & Decortication with Partial Lung Resection performed by Ralph Becerra MD at [facility] OR    VASCULAR SURGERY Left 07/08/2016    AV Fistula    VASCULAR SURGERY Right 1990    Surgery on Achilles tendon       Family History:  Family History   Problem Relation Age of Onset    Cancer Mother     Other Brother         aneurysm        Social History:  Social History     Socioeconomic History    Marital status:      Spouse name: Andrea    Number of children: 2    Years of education: 12    Highest education level: Not on file   Occupational History     Employer: UNEMPLOYED   Tobacco Use    Smoking status: Every Day     Current packs/day: 0.25     Average packs/day: 0.3 packs/day for 38.5 years (9.6 ttl pk-yrs)     Types: Cigarettes     Start date: 10/11/1985    Smokeless tobacco: Never    Tobacco comments:     occasional   Vaping Use    Vaping Use: Never used   Substance and Sexual Activity    Alcohol use: Yes     Alcohol/week: 20.0 standard drinks of alcohol     Types: 20 Cans of    HGB 9.2* 9.7* 10.2*   HCT 30.3* 32.2* 32.7*   PLT 99* 97* 106*     BMP:  Recent Labs     04/04/24  0450 04/05/24  0813 04/06/24  1340    140 137   K 5.1 5.2 5.8*    102 99   CO2 24 24 22*   BUN 42* 32* 60*   CREATININE 6.2* 5.4* 8.2*   GLUCOSE 164* 153* 87   CALCIUM 6.7* 7.5* 8.4*   MG  --   --  2.5*     Hepatic:   Recent Labs     04/06/24  1340   LABALBU 4.0   AST 14   ALT 10*   BILITOT 0.3   ALKPHOS 134*     Chest x-ray groundglass opacification in both lungs/pulm edema    Impression and Plan:  ESRD on hemodialysis  Patient missed dialysis treatment earlier today.  Presenting with volume overload, hypertensive urgency, hyperkalemia  Discussed with the ER.  Medical management initiated.  Made arrangements for hemodialysis  Plan dialysis treatment today with 2K bath, 3 hours, 3.5 L of fluid removal  Encourage patient to be compliant with dialysis treatments  Hyperkalemia.  Will correct with hemodialysis  Hypertensive urgency.  Reevaluate after ultrafiltration.  Planning 3 to 3.5 L of ultrafiltration.  Currently on Cardene and nitroglycerin drip  Cocaine use  Anemia in ESRD  Noncompliance and nonadherence  Discussed with the ER in detail  Discussed with patient in detail  Discussed with HD RN    Thank you for the consult. Please feel free to call me if you have any questions.     David Brown MD  [facility]    This report has been created using voice recognition software. It may contain minor errors which are inherent in voice recognition technology.

## 2024-04-06 NOTE — ED PROVIDER NOTES
[facility] EMERGENCY DEPARTMENT - VISIT NOTE    Patient Name: Trav Jennings  MRN: [id number]  YOB: 1965  Date of Evaluation: 4/6/2024  Treating Resident Physician: Saeid Nation MD  Supervising Physician: Demarco Turpin DO    CHIEF COMPLAINT       Chief Complaint   Patient presents with    Mental Health Problem    Suicidal       HISTORY OF PRESENT ILLNESS    HPI    History obtained from chart review and the patient.    Trav is a 59 y.o. old male who presents to the emergency department by Walk In for evaluation of chest pain, shortness of breath, suicidal ideation.  Patient has a history of end-stage renal disease on hemodialysis, Tuesday Thursday Saturday.  Was just admitted for hypertension, chest pain, and had been on a nitroglycerin infusion.  This was stopped yesterday and he was discharged home.  Patient reports since being discharged has used cocaine and alcohol again most recently after midnight last night.  This morning feels more overwhelmed.  Reported to triage staff that it is likely that shingles.  Orts he has no place to go and has a plan to himself by cutting his wrists and or his dialysis fistula.  Does have his oxygen concentrator with him but reports has not used it since being discharged.    Chart reviewed, notable for recent admission as discussed in HPI      REVIEW OF SYSTEMS   Review of Systems  Negative unless documented in HPI    PAST MEDICAL AND SURGICAL HISTORY   Trav  has a past medical history of AAA (abdominal aortic aneurysm) ([facility]), NURIS (acute kidney injury) ([facility]) (9/24/2015), Anemia associated with chronic renal failure, Arthritis, Cocaine abuse ([facility]) (5/10/2014), Depression, Diabetes mellitus ([facility]), FSGS (focal segmental glomerulosclerosis) (5/23/2013), Hemodialysis patient ([facility]) (10/17/2016), Hemorrhoids (1/16/2012), History of blood transfusion, Hyperlipidemia, Hyperphosphatemia (5/21/2016), Hypertension, Left renal artery stenosis ([facility]) (5/22/2014), Monoclonal (M) protein  of end-stage renal disease on hemodialysis, now presenting with feeling overwhelmed, suicidal ideation with plan, hypertensive, chest pain, shortness of breath.  Initial blood pressure in the 220s.  As nitroglycerin infusion has worked well for him recently I did start him again on that however he was up to 80 mcg/min and his blood pressure still around 200 so also added on nicardipine.  Potassium is 5.8 and is due for dialysis today anyways, discussed this with on-call nephrologist Dr. Brown who will arrange dialysis.  Given his suicidal ideation with a plan I have written an emergency medical certificate placed involuntary hold noted chart.  Given the rest of his medical issues he is not appropriate for psychiatry admission at this time however he will need a psychiatry consult and patient signed.    Hospitalist service was consulted for consideration of admission.    Medical Decision Making  Problems Addressed:  Cocaine use: chronic illness or injury  ESRD (end stage renal disease) on dialysis (HCC): chronic illness or injury  Hyperkalemia: undiagnosed new problem with uncertain prognosis  Hypertensive urgency: chronic illness or injury  Suicidal ideation: undiagnosed new problem with uncertain prognosis    Amount and/or Complexity of Data Reviewed  External Data Reviewed: labs and notes.  Labs: ordered. Decision-making details documented in ED Course.  Radiology: ordered and independent interpretation performed.  ECG/medicine tests: ordered and independent interpretation performed.  Discussion of management or test interpretation with external provider(s): Nephrology, hospitalist service    Risk  Prescription drug management.  Drug therapy requiring intensive monitoring for toxicity.  Decision regarding hospitalization.  Diagnosis or treatment significantly limited by social determinants of health.          ED COURSE   ED Medications administered this visit (None if left blank):   nitroGLYCERIN 200 mcg/mL in

## 2024-04-06 NOTE — H&P
Hospitalist - History & Physical  Patient: Trav Jennings    Unit/Bed:08/008A  YOB: 1965  MRN: [id number]   Acct: [id number]   PCP: Radha Hou, APRN - CNP    Date of Service: Pt seen/examined on 04/06/24  and Admitted to Inpatient with expected LOS greater than two midnights due to medical therapy.     Chief Complaint: \"my life is in shambles\"    Assessment and Plan:  Suicidal Ideation with Intent: Pt claims he wants to lacerate his AV fistula. Was EMC'd by ED staff. Psychiatry consulted as he has failed multiple attempts of safe discharge planning at this point  ESRD: With Acute Hyperkalemia. K 5.8 on arrival. Lokelma and CaGluconate administered. Gets HD TTS. Nephrology consulted. BMP in AM.   Hypertensive Urgency vs. Emergency: S/p Cocaine. Cardene and nitro gtt started.   Polysubstance Abuse: Cocaine user, repetitively. Used again overnight despite long discussion with him prior admission to quit. Because he is suicidal, he is not concerned of consequences.   Chest Pain: 2/2 cocaine use. Relief with nitro gtt. Restarted in ED. Taper off as tolerated  Chronic Hypoxic Respiratory Failure: Chronically on 3.0 LPM. Concern is recurrent uncontrolled hypertension causing pulmonary edema. Difficult to remove all during HD. Stable.   REZA: Non-adherent to CPAP 6 risk  AAA: S/p repair.     History Of Present Illness:    Trav Jennings is a 58 yo male with hx of ESRD, uncontrolled resistant hypertension, who presents to [facility] ED on 04/06/2024 as a bounce back for his chest pain after using cocaine overnight yet again despite multiple attempts to quit. Blood pressure on arrival 224/120 mmHg. Has been admitted multiple times for similar symptoms, since being discharged yesterday, he used both alcohol and cocaine yet again. Patient is very noncompliant, and even admits to suicidal ideations with intent. Patient admitted he would like to lacerate through his AV fistula. No longer wants to  live. Has not been using his home oxygen. Gets dialysis Tuesday, Thursday, Saturdays. ED staff EMC'd him due to his suicidal ideation. Psychiatry was consulted and agreed to evaluate. He has failed multiple safe discharge planning at this point. Lab workup revealed hypokalemia with potassium 5.8. Nephrology was consulted. He was due for dialysis today but did not go. Lokelma and calcium gluconate were given in the ED. Nitro gtt. was started for chest pain. Cardene gtt. for hypertensive urgency. He was then admitted for further management.    Past Medical History:        Diagnosis Date    AAA (abdominal aortic aneurysm) ([facility])     NURIS (acute kidney injury) ([facility]) 9/24/2015    Anemia associated with chronic renal failure     Arthritis     stated in hands    Cocaine abuse ([facility]) 5/10/2014    Depression     Diabetes mellitus ([facility])     pt states he no longer has diabetes he has lost alot of davion.     FSGS (focal segmental glomerulosclerosis) 5/23/2013    Hemodialysis patient ([facility]) 10/17/2016    on hemodialysis with [facility]    Hemorrhoids 1/16/2012    History of blood transfusion     Hyperlipidemia     Hyperphosphatemia 5/21/2016    Hypertension     Left renal artery stenosis ([facility]) 5/22/2014    Monoclonal (M) protein disease, multiple 'M' protein     Nicotine dependence 6/16/2014    Noncompliance     Pneumonia     Psychiatric problem     PTSD (post-traumatic stress disorder)     Pt vet from [facility]    Secondary hyperparathyroidism (of renal origin)     Sleep apnea      Past Surgical History:        Procedure Laterality Date    ABDOMINAL AORTIC ANEURYSM REPAIR      BRONCHOSCOPY N/A 10/6/2022    BRONCHOSCOPY performed by Theodore Ng MD at [facility]    DIALYSIS FISTULA CREATION Left 07/08/2016    EKG 12-LEAD  9/24/2015         HAND TENDON SURGERY Left 4/5/2019    LEFT THUMB FLEXOR TENDON REPAIR performed by Edu eCrvantes MD at [facility] OR    LEG TENDON SURGERY      [facility] GUID NDL

## 2024-04-06 NOTE — ED NOTES
Pt's BP not responding to nitro gtt. Dr. Nation and Dr. Merchant notified. Verbal order from Dr. Merchant to start cardene gtt and decrease nitro to 40mcg/min.

## 2024-04-06 NOTE — ED NOTES
ED to inpatient nurses report      Chief Complaint:  Chief Complaint   Patient presents with    Mental Health Problem    Suicidal     Present to ED from: Home    MOA:     LOC: alert and orientated to name, place, date  Mobility: Independent  Oxygen Baseline: 3L NC    Current needs required: 3L NC     Code Status:   Full Code    What abnormal results were found and what did you give/do to treat them? Hypertension (missed dialysis) / Suicidal  Any procedures or intervention occur? No    Mental Status:  Level of Consciousness: Alert (0)    Psych Assessment:        Vitals:  Patient Vitals for the past 24 hrs:   BP Temp Temp src Pulse Resp SpO2   04/06/24 1501 (!) 208/88 -- -- 93 20 99 %   04/06/24 1452 (!) 213/94 -- -- 89 -- --   04/06/24 1444 (!) 218/90 -- -- 88 -- --   04/06/24 1434 (!) 229/84 -- -- 89 -- --   04/06/24 1422 (!) 221/92 -- -- 89 -- --   04/06/24 1411 (!) 218/99 -- -- 91 18 99 %   04/06/24 1401 (!) 233/112 -- -- 89 -- --   04/06/24 1347 (!) 224/85 -- -- 92 -- --   04/06/24 1329 (!) 228/90 -- -- 93 18 97 %   04/06/24 1249 (!) 210/91 -- -- 95 20 96 %   04/06/24 1242 -- 98.2 °F (36.8 °C) Oral -- -- --        LDAs:   Peripheral IV 04/06/24 Right Cephalic (Active)   Site Assessment Clean, dry & intact 04/06/24 1510   Line Status Infusing 04/06/24 1510   Phlebitis Assessment No symptoms 04/06/24 1510   Infiltration Assessment 0 04/06/24 1510   Dressing Status Clean, dry & intact 04/06/24 1510   Dressing Type Transparent 04/06/24 1510       Ambulatory Status:  No data recorded    Diagnosis:  DISPOSITION Admitted 04/06/2024 03:12:11 PM   Final diagnoses:   None        Consults:  IP CONSULT TO NEPHROLOGY  IP CONSULT TO PSYCHIATRY     Pain Score:  Pain Assessment  Pain Assessment: None - Denies Pain    C-SSRS:   Risk of Suicide: High Risk    Sepsis Screening:  Sepsis Risk Score: 4.89    [place] Fall Risk:       Swallow Screening        Preferred Language:   English      ALLERGIES     Humalog [insulin lispro],

## 2024-04-06 NOTE — ED NOTES
Report received from GARLAND Tijerina. Pt resting in bed eating boxed lunch. Sitter remains at bedside.

## 2024-04-06 NOTE — PROGRESS NOTES
1321 - Medical provider called to discuss patient's disposition; for patient to be medically admitted. SW did not get paged, but after discussion, Dr. Smith will put in for a psych consult in inpatient.

## 2024-04-07 LAB
ANION GAP SERPL CALC-SCNC: 13 MEQ/L (ref 8–16)
BUN SERPL-MCNC: 34 MG/DL (ref 7–22)
CALCIUM SERPL-MCNC: 7.9 MG/DL (ref 8.5–10.5)
CHLORIDE SERPL-SCNC: 97 MEQ/L (ref 98–111)
CO2 SERPL-SCNC: 26 MEQ/L (ref 23–33)
CREAT SERPL-MCNC: 5.8 MG/DL (ref 0.4–1.2)
DEPRECATED RDW RBC AUTO: 53.6 FL (ref 35–45)
ERYTHROCYTE [DISTWIDTH] IN BLOOD BY AUTOMATED COUNT: 14.1 % (ref 11.5–14.5)
GFR SERPL CREATININE-BSD FRML MDRD: 11 ML/MIN/1.73M2
GLUCOSE SERPL-MCNC: 95 MG/DL (ref 70–108)
HCT VFR BLD AUTO: 31.8 % (ref 42–52)
HGB BLD-MCNC: 10 GM/DL (ref 14–18)
MCH RBC QN AUTO: 32.9 PG (ref 26–33)
MCHC RBC AUTO-ENTMCNC: 31.4 GM/DL (ref 32.2–35.5)
MCV RBC AUTO: 104.6 FL (ref 80–94)
OSMOLALITY SERPL CALC.SUM OF ELEC: 279.4 MOSMOL/KG (ref 275–300)
PLATELET # BLD AUTO: 101 THOU/MM3 (ref 130–400)
PMV BLD AUTO: 10.4 FL (ref 9.4–12.4)
POTASSIUM SERPL-SCNC: 4.8 MEQ/L (ref 3.5–5.2)
RBC # BLD AUTO: 3.04 MILL/MM3 (ref 4.7–6.1)
SODIUM SERPL-SCNC: 136 MEQ/L (ref 135–145)
WBC # BLD AUTO: 5.3 THOU/MM3 (ref 4.8–10.8)

## 2024-04-07 PROCEDURE — 93010 ELECTROCARDIOGRAM REPORT: CPT | Performed by: INTERNAL MEDICINE

## 2024-04-07 PROCEDURE — 6370000000 HC RX 637 (ALT 250 FOR IP): Performed by: INTERNAL MEDICINE

## 2024-04-07 PROCEDURE — 6360000002 HC RX W HCPCS: Performed by: STUDENT IN AN ORGANIZED HEALTH CARE EDUCATION/TRAINING PROGRAM

## 2024-04-07 PROCEDURE — 6370000000 HC RX 637 (ALT 250 FOR IP): Performed by: STUDENT IN AN ORGANIZED HEALTH CARE EDUCATION/TRAINING PROGRAM

## 2024-04-07 PROCEDURE — 80048 BASIC METABOLIC PNL TOTAL CA: CPT

## 2024-04-07 PROCEDURE — 99233 SBSQ HOSP IP/OBS HIGH 50: CPT | Performed by: STUDENT IN AN ORGANIZED HEALTH CARE EDUCATION/TRAINING PROGRAM

## 2024-04-07 PROCEDURE — 85027 COMPLETE CBC AUTOMATED: CPT

## 2024-04-07 PROCEDURE — 36415 COLL VENOUS BLD VENIPUNCTURE: CPT

## 2024-04-07 PROCEDURE — 2580000003 HC RX 258: Performed by: STUDENT IN AN ORGANIZED HEALTH CARE EDUCATION/TRAINING PROGRAM

## 2024-04-07 PROCEDURE — 2140000000 HC CCU INTERMEDIATE R&B

## 2024-04-07 PROCEDURE — 99232 SBSQ HOSP IP/OBS MODERATE 35: CPT | Performed by: INTERNAL MEDICINE

## 2024-04-07 RX ORDER — DOXAZOSIN MESYLATE 4 MG/1
4 TABLET ORAL EVERY 12 HOURS SCHEDULED
Status: DISCONTINUED | OUTPATIENT
Start: 2024-04-07 | End: 2024-04-08 | Stop reason: HOSPADM

## 2024-04-07 RX ORDER — ISOSORBIDE MONONITRATE 60 MG/1
120 TABLET, EXTENDED RELEASE ORAL 2 TIMES DAILY
Status: DISCONTINUED | OUTPATIENT
Start: 2024-04-07 | End: 2024-04-08 | Stop reason: HOSPADM

## 2024-04-07 RX ORDER — HEPARIN SODIUM 5000 [USP'U]/ML
5000 INJECTION, SOLUTION INTRAVENOUS; SUBCUTANEOUS EVERY 8 HOURS SCHEDULED
Status: DISCONTINUED | OUTPATIENT
Start: 2024-04-07 | End: 2024-04-08 | Stop reason: HOSPADM

## 2024-04-07 RX ORDER — CINACALCET 30 MG/1
90 TABLET, FILM COATED ORAL
Status: DISCONTINUED | OUTPATIENT
Start: 2024-04-08 | End: 2024-04-08 | Stop reason: HOSPADM

## 2024-04-07 RX ADMIN — SERTRALINE 100 MG: 100 TABLET, FILM COATED ORAL at 08:19

## 2024-04-07 RX ADMIN — HEPARIN SODIUM 5000 UNITS: 5000 INJECTION INTRAVENOUS; SUBCUTANEOUS at 21:16

## 2024-04-07 RX ADMIN — Medication 360 MG: at 16:36

## 2024-04-07 RX ADMIN — Medication 360 MG: at 12:32

## 2024-04-07 RX ADMIN — Medication 400 UNITS: at 08:19

## 2024-04-07 RX ADMIN — CLONIDINE HYDROCHLORIDE 0.3 MG: 0.2 TABLET ORAL at 09:42

## 2024-04-07 RX ADMIN — ATORVASTATIN CALCIUM 40 MG: 40 TABLET, FILM COATED ORAL at 19:43

## 2024-04-07 RX ADMIN — SODIUM CHLORIDE, PRESERVATIVE FREE 10 ML: 5 INJECTION INTRAVENOUS at 19:44

## 2024-04-07 RX ADMIN — ISOSORBIDE MONONITRATE 120 MG: 60 TABLET, EXTENDED RELEASE ORAL at 12:02

## 2024-04-07 RX ADMIN — ACETAMINOPHEN 650 MG: 325 TABLET ORAL at 19:45

## 2024-04-07 RX ADMIN — ASPIRIN 81 MG: 81 TABLET, COATED ORAL at 08:19

## 2024-04-07 RX ADMIN — HEPARIN SODIUM 5000 UNITS: 5000 INJECTION INTRAVENOUS; SUBCUTANEOUS at 13:31

## 2024-04-07 RX ADMIN — ISOSORBIDE MONONITRATE 120 MG: 60 TABLET, EXTENDED RELEASE ORAL at 19:42

## 2024-04-07 RX ADMIN — Medication 1000 UNITS: at 08:19

## 2024-04-07 RX ADMIN — FAMOTIDINE 20 MG: 20 TABLET, FILM COATED ORAL at 08:20

## 2024-04-07 RX ADMIN — SODIUM CHLORIDE 2.5 MG/HR: 9 INJECTION, SOLUTION INTRAVENOUS at 03:21

## 2024-04-07 RX ADMIN — SODIUM CHLORIDE, PRESERVATIVE FREE 10 ML: 5 INJECTION INTRAVENOUS at 08:22

## 2024-04-07 RX ADMIN — HYDRALAZINE HYDROCHLORIDE 100 MG: 50 TABLET ORAL at 08:20

## 2024-04-07 RX ADMIN — CLONIDINE HYDROCHLORIDE 0.3 MG: 0.2 TABLET ORAL at 15:24

## 2024-04-07 RX ADMIN — Medication 360 MG: at 08:20

## 2024-04-07 RX ADMIN — AMLODIPINE BESYLATE 10 MG: 10 TABLET ORAL at 08:20

## 2024-04-07 RX ADMIN — FOLIC ACID 1 MG: 1 TABLET ORAL at 08:20

## 2024-04-07 RX ADMIN — CLONIDINE HYDROCHLORIDE 0.3 MG: 0.2 TABLET ORAL at 21:18

## 2024-04-07 RX ADMIN — FLUTICASONE PROPIONATE 1 SPRAY: 50 SPRAY, METERED NASAL at 08:19

## 2024-04-07 RX ADMIN — DOXAZOSIN 4 MG: 4 TABLET ORAL at 19:43

## 2024-04-07 ASSESSMENT — PAIN - FUNCTIONAL ASSESSMENT: PAIN_FUNCTIONAL_ASSESSMENT: ACTIVITIES ARE NOT PREVENTED

## 2024-04-07 ASSESSMENT — PAIN DESCRIPTION - DESCRIPTORS: DESCRIPTORS: ACHING

## 2024-04-07 ASSESSMENT — PAIN SCALES - GENERAL
PAINLEVEL_OUTOF10: 0
PAINLEVEL_OUTOF10: 3

## 2024-04-07 ASSESSMENT — PAIN DESCRIPTION - FREQUENCY: FREQUENCY: INTERMITTENT

## 2024-04-07 ASSESSMENT — PAIN DESCRIPTION - ORIENTATION: ORIENTATION: RIGHT

## 2024-04-07 ASSESSMENT — PAIN DESCRIPTION - PAIN TYPE: TYPE: ACUTE PAIN

## 2024-04-07 ASSESSMENT — PAIN DESCRIPTION - ONSET: ONSET: ON-GOING

## 2024-04-07 ASSESSMENT — PAIN DESCRIPTION - LOCATION: LOCATION: ARM

## 2024-04-07 NOTE — PROGRESS NOTES
[facility]   Nephrology progress note  4/7/2024, 10:53 AM      Pt Name:    Trav Jennings  MRN:     [id number]     YOB: 1965  Admit Date:    4/6/2024 12:35 PM    Chief Complaint: Nephrology following for ESRD and HD    Subjective:  Patient was seen and examined this morning  No chest pain or shortness of breath  Feels okay  Under one-to-one observation    Objective:  24HR INTAKE/OUTPUT:    Intake/Output Summary (Last 24 hours) at 4/7/2024 1053  Last data filed at 4/7/2024 0839  Gross per 24 hour   Intake 390 ml   Output 3000 ml   Net -2610 ml         I/O last 3 completed shifts:  In: 0   Out: 3000   I/O this shift:  In: 390 [P.O.:390]  Out: -    Admission weight: 96.2 kg (212 lb 1.3 oz)  Wt Readings from Last 3 Encounters:   04/07/24 93.5 kg (206 lb 1.6 oz)   04/05/24 96.2 kg (212 lb 1.3 oz)   03/10/24 93.9 kg (207 lb)        Vitals :   Vitals:    04/07/24 0302 04/07/24 0345 04/07/24 0800 04/07/24 0938   BP: (!) 179/78 (!) 180/73 (!) 178/75 (!) 175/81   Pulse: 87  87    Resp: 18  18    Temp: 97.9 °F (36.6 °C)  98.3 °F (36.8 °C)    TempSrc: Oral  Oral    SpO2: 98%  96%    Weight: 93.5 kg (206 lb 1.6 oz)          Physical examination  General Appearance: alert and cooperative with exam, appears comfortable, no distress  Mouth/Throat: Oral mucosa moist  Neck: No JVD  Lungs: Air entry B/L, no rales, no use of accessory muscles  GI: soft, non-tender, no guarding  Extremities.  Improved  Musculoskeletal.  Left upper extremity AV fistula  Medications:  Infusion:    sodium chloride       Meds:    [START ON 4/8/2024] cinacalcet  90 mg Oral Once per day on Mon Wed Fri    heparin (porcine)  5,000 Units SubCUTAneous 3 times per day    sodium chloride flush  5-40 mL IntraVENous 2 times per day    amLODIPine  10 mg Oral Daily    aspirin  81 mg Oral Daily    atorvastatin  40 mg Oral Nightly    cloNIDine  0.3 mg Oral TID    doxazosin  4 mg Oral Nightly    famotidine  20 mg Oral Daily    ferrous  gluconate  360 mg Oral TID     fluticasone  1 spray Each Nostril Daily    folic acid  1 mg Oral Daily    hydrALAZINE  100 mg Oral q8h    isosorbide mononitrate  120 mg Oral BID    sertraline  100 mg Oral Daily    Vitamin D  1,000 Units Oral Daily    vitamin E  400 Units Oral Daily    [START ON 4/8/2024] calcitRIOL  0.5 mcg Oral Once per day on Mon Wed Fri     Meds prn: sodium chloride flush, sodium chloride, ondansetron **OR** ondansetron, polyethylene glycol, acetaminophen **OR** acetaminophen, albuterol sulfate HFA, traZODone     Lab Data :  CBC:   Recent Labs     04/05/24  0440 04/06/24  1340 04/07/24  0423   WBC 3.1* 6.3 5.3   HGB 9.7* 10.2* 10.0*   HCT 32.2* 32.7* 31.8*   PLT 97* 106* 101*     CMP:  Recent Labs     04/05/24  0813 04/06/24  1340 04/07/24  0423    137 136   K 5.2 5.8* 4.8    99 97*   CO2 24 22* 26   BUN 32* 60* 34*   CREATININE 5.4* 8.2* 5.8*   GLUCOSE 153* 87 95   CALCIUM 7.5* 8.4* 7.9*   MG  --  2.5*  --    PHOS  --  4.7  --      Hepatic:   Recent Labs     04/06/24  1340   LABALBU 4.0   AST 14   ALT 10*   BILITOT 0.3   ALKPHOS 134*         Assessment and Plan:  ESRD on hemodialysis  Had dialysis treatment yesterday with ultrafiltration  No indication for dialysis today  Will plan dialysis treatment tomorrow  Hyperkalemia improved  Volume overload.  Improving.  No emergent need for dialysis today.  Likely plan dialysis tomorrow  Hypertension.  Off Cardene and nitroglycerin drip.  Increase Cardura twice daily  Polysubstance use  Anemia in ESRD.  Monitor H&H, no need for KENYETTA today        David Brown MD  [facility]    This report has been created using voice recognition software. It may contain minor errors which are inherent in voice recognition technology

## 2024-04-07 NOTE — CONSULTS
Patient interviewed  Meds and chart reviewed  Orders written  No  Full consult will be dictated  Thanks for the consult.

## 2024-04-07 NOTE — CARE COORDINATION
Case Management Assessment  Initial Evaluation    Date/Time of Evaluation: 4/7/2024 1:58 PM  Assessment Completed by: Kasandra Gao RN    If patient is discharged prior to next notation, then this note serves as note for discharge by case management.    Patient Name: Trav Jennings                   YOB: 1965  Diagnosis: Suicidal ideation [R45.851]  Hyperkalemia [E87.5]  Acute hyperkalemia [E87.5]  ESRD (end stage renal disease) on dialysis (HCC) [N18.6, Z99.2]  Hypertensive urgency [I16.0]  Cocaine use [F14.90]                   Date / Time: 4/6/2024 12:35 PM  Location: [address] [place] [address]-     Patient Admission Status: Inpatient   Readmission Risk Low 0-14, Mod 15-19), High > 20: Readmission Risk Score: 33.6    Current PCP: Radha Hou APRN - CNP  PCP verified by CM? Yes    Chart Reviewed: Yes      History Provided by: Patient, Medical Record  Patient Orientation: Alert and Oriented, Person, Place, Situation, Self    Patient Cognition: Alert    Hospitalization in the last 30 days (Readmission):  Yes    If yes, Readmission Assessment in CM Navigator will be completed.    Advance Directives:      Code Status: Full Code   Patient's Primary Decision Maker is: Patient Declined (Legal Next of Kin Remains as Decision Maker)      Discharge Planning:    Patient lives with: Other (Comment) (homeless) Type of Home: Homeless  Primary Care Giver: Self  Patient Support Systems include: Family Members, Friends/Neighbors   Current Financial resources: Medicaid, Medicare  Current community resources: Transportation  Current services prior to admission: Oxygen Therapy ([facility])            Current DME: Oxygen Therapy (Comment)            Type of Home Care services:  None    ADLS  Prior functional level: Independent in ADLs/IADLs, Bathing, Dressing, Toileting, Feeding, Cooking, Housework, Shopping, Mobility  Current functional level: Independent in ADLs/IADLs, Bathing, Dressing, Toileting, Feeding, Cooking, Mobility,

## 2024-04-07 NOTE — CARE COORDINATION
04/07/24 1354   Readmission Assessment   Number of Days since last admission? 1-7 days   Previous Disposition Other (comment)  (homeless)   Who is being Interviewed Patient   What was the patient's/caregiver's perception as to why they think they needed to return back to the hospital? Other (Comment)  (Trav is homeless. He had no where to go.)   Did you visit your Primary Care Physician after you left the hospital, before you returned this time? No   Why weren't you able to visit your PCP? Did not have an appointment   Did you see a specialist, such as Cardiac, Pulmonary, Orthopedic Physician, etc. after you left the hospital? No   Who advised the patient to return to the hospital? Self-referral   Does the patient report anything that got in the way of taking their medications? No   In our efforts to provide the best possible care to you and others like you, can you think of anything that we could have done to help you after you left the hospital the first time, so that you might not have needed to return so soon? Other (Comment)  (Trav states he was discharged from the hospital too soon. He has no where to go.)

## 2024-04-07 NOTE — CARE COORDINATION
4/7/24, 3:20 PM EDT    DISCHARGE PLANNING EVALUATION    Attempted to reach out to the [facility] ask if Trav would have any benefits though his reported  service.  Was on hold and no one answered.  Will try again.

## 2024-04-07 NOTE — PROGRESS NOTES
Pt had orange/brown SI scrubs put on him but pt stated they were like \"FCI scrubs\" and it was violated his \"HIPAA rights\" wearing them. Pt took them off and refused to wear them despite education and explanation of safety and purpose of scrubs by writer of this note and Ce Boston, Charge RN. Nursing staff even attempted to get him OR scrubs so they would have no ties and be safe for the patient but not be in a color that was upsetting to pt. When OR scrubs were brought to pt and explanation and education was provided by writer of this note and Ce Boston RN; pt still refused to wear scrubs saying he was going to stay in his briefs and be shirtless. Writer of this note notified House Supervisor, Jessica to update her on why scrub protocol could not be followed through due to pt refusal. Pt has 1:1 sitter at bedside and room has been proofed with all wires and potentially harmful items and equipment removed for pt safety per protocol. Will standby and continue to assess.

## 2024-04-07 NOTE — PROGRESS NOTES
Hospitalist Progress Note      Patient:  Trav Jennings    Unit/Bed:3B-35/035-A  YOB: 1965  MRN: [id number]   Acct: [id number]   PCP: Radha Hou APRN - CNP  Date of Admission: 4/6/2024    Assessment/Plan:    Suicidal ideation with intent  Patient states he still continues to have suicidal thoughts this morning.  He states that he has thought about how he would do this multiple times including walking out in traffic, slicing his wrist and/or fistula, or jumping off of a building.  Continue one-to-one sitter with suicide precautions in place.  Awaiting psychiatric evaluation.  Likely would benefit from inpatient psychiatric care upon medical stability  Hyperkalemia, resolved  Due to ESRD and noncompliance with outpatient dialysis.  Patient was dialyzed on 4/6.  Potassium this morning down to 4.8.  Okay to discontinue Lokelma at this time  ESRD on HD  Access: Left upper extremity fistula  Last hemodialysis session on 4/6.  Continue with calcitriol, Cinacalcet  Nephrology following.   Severe hypertension  Was requiring nitroglycerin and Cardene drips prior to and during dialysis yesterday.  Both drips have since been turned off.  Will continue to attempt to control his blood pressure with p.o. medications.  Continue amlodipine 10, Cardura 4, hydralazine 100, Clonidine 0.3.  Can consider adding beta-blocker, but given active cocaine use would have at least a relative contraindication of the risks would likely overstated.   Can also consider increasing Cardura  Will resume Imdur today.   Homelessness  During prior hospitalization, patient was provided with list of homeless shelters as well as social work and community resources.  Currently states he is staying at a friend's house and sleeps in a kitchen chair.  CM/SW following.   Polysubstance abuse  Admits to cocaine use.  States multiple family members and friends also use illicit substances making  120 mg Oral BID    sertraline  100 mg Oral Daily    Vitamin D  1,000 Units Oral Daily    vitamin E  400 Units Oral Daily    [START ON 4/8/2024] calcitRIOL  0.5 mcg Oral Once per day on Mon Wed Fri     PRN Meds: sodium chloride flush, sodium chloride, ondansetron **OR** ondansetron, polyethylene glycol, acetaminophen **OR** acetaminophen, albuterol sulfate HFA, traZODone      Intake/Output Summary (Last 24 hours) at 4/7/2024 0936  Last data filed at 4/7/2024 0839  Gross per 24 hour   Intake 390 ml   Output 3000 ml   Net -2610 ml       Diet:  ADULT DIET; Regular; 4 carb choices (60 gm/meal); Low Sodium (2 gm); Low Potassium (Less than 3000 mg/day); Low Phosphorus (Less than 1000 mg); 1500 ml    Exam:  BP (!) 178/75   Pulse 87   Temp 98.3 °F (36.8 °C) (Oral)   Resp 18   Wt 93.5 kg (206 lb 1.6 oz)   SpO2 96%   BMI 25.76 kg/m²   General appearance: Middle-age male who appears older than stated age  HEENT: Pupils are equal and round  Neck: No appreciable JVD  Respiratory:  Normal respiratory effort. Clear to auscultation, bilaterally without Rales/Wheezes/Rhonchi.  Cardiovascular: Regular rate and rhythm with normal S1/S2 without murmurs, rubs or gallops.  There is a left upper extremity fistula with no active bleeding.  Palpable thrill.  Abdomen: Soft, non-tender, non-distended with normal bowel sounds.  Musculoskeletal: passive and active ROM x 4 extremities.  Skin: Skin color, texture, turgor normal.  No rashes or lesions.  Neurologic:  Neurovascularly intact without any focal sensory/motor deficits. Cranial nerves: II-XII intact, grossly non-focal.  Psychiatric: Patient expresses continued depressive thoughts as well as thoughts of suicide with numerous potential plans    Labs:   Recent Labs     04/05/24  0440 04/06/24  1340 04/07/24  0423   WBC 3.1* 6.3 5.3   HGB 9.7* 10.2* 10.0*   HCT 32.2* 32.7* 31.8*   PLT 97* 106* 101*     Recent Labs     04/05/24  0813 04/06/24  1340 04/07/24  0423    137 136   K 5.2

## 2024-04-07 NOTE — FLOWSHEET NOTE
04/06/24 1950   Vital Signs   BP (!) 205/95   Pulse 100   Respirations 15   SpO2 96 %   Weight - Scale 93.2 kg (205 lb 7.5 oz)   Weight Method Stated   Percent Weight Change -3.12   Pain Assessment   Pain Assessment None - Denies Pain   Post-Hemodialysis Assessment   Post-Treatment Procedures Blood returned;Access bleeding time < 10 minutes   Machine Disinfection Process Acid/Vinegar Clean;Heat Disinfect;Exterior Machine Disinfection   Rinseback Volume (ml) 400 ml   Blood Volume Processed (Liters) 90 L   Hemodialysis Output (ml) 3000 ml   Tolerated Treatment Good     completed 3 hr HD TX and removed 3 Liters of fluid, as ordered. pt required Cardene and Nitro Drip for hypertension during Dialysis. pt also required a sitter by bedside due to having ideations of hurting himself, during Dialysis pt rested and was quietly sleeping through most of TX.  pt stated he has no pain, (Chest pain) and has been asymptomatic entire TX. pt TX ended, all blood returned with 400 mls rinse back. removed X2 (14g Needles), applied sterile gauze with light pressure for less than 10 min. pt returned to his room with his left, upper arm dressing clean, dry and intact. report given to primary nurse, record completed. and printed, to be scanned into pt's EMR.

## 2024-04-07 NOTE — PLAN OF CARE
Problem: Self Harm/Suicidality  Goal: Will have no self-injury during hospital stay  Description: INTERVENTIONS:  1.  Ensure constant observer at bedside with Q15M safety checks  2.  Maintain a safe environment  3.  Secure patient belongings  4.  Ensure family/visitors adhere to safety recommendations  5.  Ensure safety tray has been added to patient's diet order  6.  Every shift and PRN: Re-assess suicidal risk via Frequent Screener    4/7/2024 1623 by Genevieve Solorzano, RN  Outcome: Progressing  Flowsheets (Taken 4/7/2024 0806)  Will have no self-injury during hospital stay: Ensure constant observer at bedside with Q15M safety checks   Patient continues to have thoughts of self harm. Continue suicide precautions and sitter in room.     Problem: Discharge Planning  Goal: Discharge to home or other facility with appropriate resources  Outcome: Progressing  Flowsheets (Taken 4/7/2024 0806)  Discharge to home or other facility with appropriate resources: Identify barriers to discharge with patient and caregiver   Patient states he is homeless.  to assess for discharge needs.     Problem: ABCDS Injury Assessment  Goal: Absence of physical injury  Outcome: Progressing  Flowsheets (Taken 4/7/2024 0253 by Tracie Boggs, RN)  Absence of Physical Injury: Implement safety measures based on patient assessment     Problem: Risk for Elopement  Goal: Patient will not exit the unit/facility without proper excort  Outcome: Progressing  Flowsheets  Taken 4/7/2024 0806 by Genevieve Solorzano, RN  Nursing Interventions for Elopement Risk: Assist with personal care needs such as toileting, eating, dressing, as needed to reduce the risk of wandering  Taken 4/7/2024 0400 by Tracie Boggs, RN  Nursing Interventions for Elopement Risk: Assist with personal care needs such as toileting, eating, dressing, as needed to reduce the risk of wandering  Sitter present in room, patient has no statements of leaving AMA.      Problem: Confusion  Goal: Confusion, delirium, dementia, or psychosis is improved or at baseline  Description: INTERVENTIONS:  1. Assess for possible contributors to thought disturbance, including medications, impaired vision or hearing, underlying metabolic abnormalities, dehydration, psychiatric diagnoses, and notify attending LIP  2. [place] high risk fall precautions, as indicated  3. Provide frequent short contacts to provide reality reorientation, refocusing and direction  4. Decrease environmental stimuli, including noise as appropriate  5. Monitor and intervene to maintain adequate nutrition, hydration, elimination, sleep and activity  6. If unable to ensure safety without constant attention obtain sitter and review sitter guidelines with assigned personnel  7. Initiate Psychosocial CNS and Spiritual Care consult, as indicated  Outcome: Progressing  Flowsheets (Taken 4/7/2024 0806)  Effect of thought disturbance (confusion, delirium, dementia, or psychosis) are managed with adequate functional status: Assess for contributors to thought disturbance, including medications, impaired vision or hearing, underlying metabolic abnormalities, dehydration, psychiatric diagnoses, notify LIP   Patient alert and oriented, no confusion noted.    Problem: Chronic Conditions and Co-morbidities  Goal: Patient's chronic conditions and co-morbidity symptoms are monitored and maintained or improved  Outcome: Progressing  Flowsheets (Taken 4/7/2024 0806)  Care Plan - Patient's Chronic Conditions and Co-Morbidity Symptoms are Monitored and Maintained or Improved: Monitor and assess patient's chronic conditions and comorbid symptoms for stability, deterioration, or improvement     Problem: Safety - Adult  Goal: Free from fall injury  Outcome: Progressing  Flowsheets (Taken 4/7/2024 0253 by Tracie Boggs, RN)  Free From Fall Injury: Instruct family/caregiver on patient safety   Continue fall precautions, sitter for safety.

## 2024-04-08 ENCOUNTER — HOSPITAL ENCOUNTER (INPATIENT)
Age: 59
LOS: 8 days | Discharge: HOME | DRG: 896 | End: 2024-04-16
Attending: PSYCHIATRY & NEUROLOGY | Admitting: PSYCHIATRY & NEUROLOGY
Payer: MEDICARE

## 2024-04-08 VITALS
HEART RATE: 79 BPM | RESPIRATION RATE: 18 BRPM | OXYGEN SATURATION: 100 % | WEIGHT: 201.28 LBS | DIASTOLIC BLOOD PRESSURE: 63 MMHG | TEMPERATURE: 98 F | SYSTOLIC BLOOD PRESSURE: 157 MMHG | BODY MASS INDEX: 25.16 KG/M2

## 2024-04-08 LAB
ANION GAP SERPL CALC-SCNC: 14 MEQ/L (ref 8–16)
BUN SERPL-MCNC: 54 MG/DL (ref 7–22)
CALCIUM SERPL-MCNC: 7.8 MG/DL (ref 8.5–10.5)
CHLORIDE SERPL-SCNC: 96 MEQ/L (ref 98–111)
CO2 SERPL-SCNC: 27 MEQ/L (ref 23–33)
CREAT SERPL-MCNC: 8.7 MG/DL (ref 0.4–1.2)
DEPRECATED RDW RBC AUTO: 54 FL (ref 35–45)
EKG ATRIAL RATE: 94 BPM
EKG P AXIS: 59 DEGREES
EKG P-R INTERVAL: 200 MS
EKG Q-T INTERVAL: 386 MS
EKG QRS DURATION: 108 MS
EKG QTC CALCULATION (BAZETT): 482 MS
EKG R AXIS: -47 DEGREES
EKG T AXIS: 99 DEGREES
EKG VENTRICULAR RATE: 94 BPM
ERYTHROCYTE [DISTWIDTH] IN BLOOD BY AUTOMATED COUNT: 13.6 % (ref 11.5–14.5)
GFR SERPL CREATININE-BSD FRML MDRD: 7 ML/MIN/1.73M2
GLUCOSE SERPL-MCNC: 86 MG/DL (ref 70–108)
HCT VFR BLD AUTO: 29.2 % (ref 42–52)
HGB BLD-MCNC: 9 GM/DL (ref 14–18)
MCH RBC QN AUTO: 33.1 PG (ref 26–33)
MCHC RBC AUTO-ENTMCNC: 30.8 GM/DL (ref 32.2–35.5)
MCV RBC AUTO: 107.4 FL (ref 80–94)
PLATELET # BLD AUTO: 98 THOU/MM3 (ref 130–400)
PMV BLD AUTO: 10.5 FL (ref 9.4–12.4)
POTASSIUM SERPL-SCNC: 5.4 MEQ/L (ref 3.5–5.2)
RBC # BLD AUTO: 2.72 MILL/MM3 (ref 4.7–6.1)
SODIUM SERPL-SCNC: 137 MEQ/L (ref 135–145)
WBC # BLD AUTO: 3.3 THOU/MM3 (ref 4.8–10.8)

## 2024-04-08 PROCEDURE — 36415 COLL VENOUS BLD VENIPUNCTURE: CPT

## 2024-04-08 PROCEDURE — 2580000003 HC RX 258: Performed by: STUDENT IN AN ORGANIZED HEALTH CARE EDUCATION/TRAINING PROGRAM

## 2024-04-08 PROCEDURE — 6370000000 HC RX 637 (ALT 250 FOR IP): Performed by: STUDENT IN AN ORGANIZED HEALTH CARE EDUCATION/TRAINING PROGRAM

## 2024-04-08 PROCEDURE — 6370000000 HC RX 637 (ALT 250 FOR IP): Performed by: INTERNAL MEDICINE

## 2024-04-08 PROCEDURE — 99232 SBSQ HOSP IP/OBS MODERATE 35: CPT | Performed by: INTERNAL MEDICINE

## 2024-04-08 PROCEDURE — 80048 BASIC METABOLIC PNL TOTAL CA: CPT

## 2024-04-08 PROCEDURE — 99239 HOSP IP/OBS DSCHRG MGMT >30: CPT | Performed by: STUDENT IN AN ORGANIZED HEALTH CARE EDUCATION/TRAINING PROGRAM

## 2024-04-08 PROCEDURE — 6360000002 HC RX W HCPCS: Performed by: STUDENT IN AN ORGANIZED HEALTH CARE EDUCATION/TRAINING PROGRAM

## 2024-04-08 PROCEDURE — 85027 COMPLETE CBC AUTOMATED: CPT

## 2024-04-08 PROCEDURE — 1240000000 HC EMOTIONAL WELLNESS R&B

## 2024-04-08 RX ORDER — FAMOTIDINE 20 MG/1
20 TABLET, FILM COATED ORAL DAILY
Status: DISCONTINUED | OUTPATIENT
Start: 2024-04-09 | End: 2024-04-16 | Stop reason: HOSPADM

## 2024-04-08 RX ORDER — DOXAZOSIN MESYLATE 4 MG/1
4 TABLET ORAL EVERY 12 HOURS SCHEDULED
Status: CANCELLED | OUTPATIENT
Start: 2024-04-08

## 2024-04-08 RX ORDER — ATORVASTATIN CALCIUM 40 MG/1
40 TABLET, FILM COATED ORAL NIGHTLY
Status: CANCELLED | OUTPATIENT
Start: 2024-04-08

## 2024-04-08 RX ORDER — AMLODIPINE BESYLATE 10 MG/1
10 TABLET ORAL DAILY
Status: DISCONTINUED | OUTPATIENT
Start: 2024-04-09 | End: 2024-04-16 | Stop reason: HOSPADM

## 2024-04-08 RX ORDER — FLUTICASONE PROPIONATE 50 MCG
1 SPRAY, SUSPENSION (ML) NASAL DAILY
Status: CANCELLED | OUTPATIENT
Start: 2024-04-09

## 2024-04-08 RX ORDER — ATORVASTATIN CALCIUM 40 MG/1
40 TABLET, FILM COATED ORAL NIGHTLY
Status: DISCONTINUED | OUTPATIENT
Start: 2024-04-08 | End: 2024-04-16 | Stop reason: HOSPADM

## 2024-04-08 RX ORDER — HEPARIN SODIUM 5000 [USP'U]/ML
5000 INJECTION, SOLUTION INTRAVENOUS; SUBCUTANEOUS EVERY 8 HOURS SCHEDULED
OUTPATIENT
Start: 2024-04-08

## 2024-04-08 RX ORDER — QUINIDINE GLUCONATE 324 MG
324 TABLET, EXTENDED RELEASE ORAL
Status: DISCONTINUED | OUTPATIENT
Start: 2024-04-09 | End: 2024-04-15

## 2024-04-08 RX ORDER — DOXAZOSIN MESYLATE 4 MG/1
4 TABLET ORAL EVERY 12 HOURS SCHEDULED
Status: DISCONTINUED | OUTPATIENT
Start: 2024-04-08 | End: 2024-04-16 | Stop reason: HOSPADM

## 2024-04-08 RX ORDER — VITAMIN E 268 MG
400 CAPSULE ORAL DAILY
Status: DISCONTINUED | OUTPATIENT
Start: 2024-04-09 | End: 2024-04-16 | Stop reason: HOSPADM

## 2024-04-08 RX ORDER — HYDRALAZINE HYDROCHLORIDE 50 MG/1
100 TABLET, FILM COATED ORAL EVERY 8 HOURS
Status: CANCELLED | OUTPATIENT
Start: 2024-04-08

## 2024-04-08 RX ORDER — FLUTICASONE PROPIONATE 50 MCG
1 SPRAY, SUSPENSION (ML) NASAL DAILY
Status: DISCONTINUED | OUTPATIENT
Start: 2024-04-09 | End: 2024-04-16 | Stop reason: HOSPADM

## 2024-04-08 RX ORDER — VITAMIN B COMPLEX
1000 TABLET ORAL DAILY
Status: DISCONTINUED | OUTPATIENT
Start: 2024-04-09 | End: 2024-04-16 | Stop reason: HOSPADM

## 2024-04-08 RX ORDER — ASPIRIN 81 MG/1
81 TABLET ORAL DAILY
Status: CANCELLED | OUTPATIENT
Start: 2024-04-09

## 2024-04-08 RX ORDER — CINACALCET 30 MG/1
90 TABLET, FILM COATED ORAL
Status: DISCONTINUED | OUTPATIENT
Start: 2024-04-10 | End: 2024-04-16 | Stop reason: HOSPADM

## 2024-04-08 RX ORDER — CINACALCET 30 MG/1
90 TABLET, FILM COATED ORAL
Status: CANCELLED | OUTPATIENT
Start: 2024-04-10

## 2024-04-08 RX ORDER — QUINIDINE GLUCONATE 324 MG
324 TABLET, EXTENDED RELEASE ORAL
Status: CANCELLED | OUTPATIENT
Start: 2024-04-08

## 2024-04-08 RX ORDER — SERTRALINE HYDROCHLORIDE 100 MG/1
100 TABLET, FILM COATED ORAL DAILY
Status: DISCONTINUED | OUTPATIENT
Start: 2024-04-09 | End: 2024-04-11

## 2024-04-08 RX ORDER — SERTRALINE HYDROCHLORIDE 100 MG/1
100 TABLET, FILM COATED ORAL DAILY
Status: CANCELLED | OUTPATIENT
Start: 2024-04-09

## 2024-04-08 RX ORDER — AMLODIPINE BESYLATE 10 MG/1
10 TABLET ORAL DAILY
Status: CANCELLED | OUTPATIENT
Start: 2024-04-09

## 2024-04-08 RX ORDER — ACETAMINOPHEN 325 MG/1
650 TABLET ORAL EVERY 4 HOURS PRN
Status: DISCONTINUED | OUTPATIENT
Start: 2024-04-08 | End: 2024-04-11

## 2024-04-08 RX ORDER — ATORVASTATIN CALCIUM 80 MG/1
40 TABLET, FILM COATED ORAL DAILY
Status: ON HOLD | COMMUNITY
End: 2024-04-15

## 2024-04-08 RX ORDER — ISOSORBIDE MONONITRATE 60 MG/1
120 TABLET, EXTENDED RELEASE ORAL 2 TIMES DAILY
Status: CANCELLED | OUTPATIENT
Start: 2024-04-08

## 2024-04-08 RX ORDER — ASPIRIN 81 MG/1
81 TABLET ORAL DAILY
Status: DISCONTINUED | OUTPATIENT
Start: 2024-04-09 | End: 2024-04-16 | Stop reason: HOSPADM

## 2024-04-08 RX ORDER — TRAZODONE HYDROCHLORIDE 50 MG/1
50 TABLET ORAL NIGHTLY PRN
Status: DISCONTINUED | OUTPATIENT
Start: 2024-04-08 | End: 2024-04-09

## 2024-04-08 RX ORDER — FAMOTIDINE 20 MG/1
20 TABLET, FILM COATED ORAL DAILY
Status: CANCELLED | OUTPATIENT
Start: 2024-04-09

## 2024-04-08 RX ORDER — FOLIC ACID 1 MG/1
1 TABLET ORAL DAILY
Status: CANCELLED | OUTPATIENT
Start: 2024-04-09

## 2024-04-08 RX ORDER — FOLIC ACID 1 MG/1
1 TABLET ORAL DAILY
Status: DISCONTINUED | OUTPATIENT
Start: 2024-04-09 | End: 2024-04-16 | Stop reason: HOSPADM

## 2024-04-08 RX ORDER — CALCITRIOL 0.25 UG/1
0.5 CAPSULE, LIQUID FILLED ORAL
Status: CANCELLED | OUTPATIENT
Start: 2024-04-10

## 2024-04-08 RX ORDER — TRAZODONE HYDROCHLORIDE 50 MG/1
50 TABLET ORAL NIGHTLY PRN
Status: CANCELLED | OUTPATIENT
Start: 2024-04-08

## 2024-04-08 RX ORDER — VITAMIN E 268 MG
400 CAPSULE ORAL DAILY
Status: CANCELLED | OUTPATIENT
Start: 2024-04-09

## 2024-04-08 RX ORDER — HYDRALAZINE HYDROCHLORIDE 50 MG/1
100 TABLET, FILM COATED ORAL EVERY 8 HOURS
Status: DISCONTINUED | OUTPATIENT
Start: 2024-04-09 | End: 2024-04-16 | Stop reason: HOSPADM

## 2024-04-08 RX ORDER — POLYETHYLENE GLYCOL 3350 17 G/17G
17 POWDER, FOR SOLUTION ORAL DAILY PRN
Status: DISCONTINUED | OUTPATIENT
Start: 2024-04-08 | End: 2024-04-16 | Stop reason: HOSPADM

## 2024-04-08 RX ORDER — CALCITRIOL 0.25 UG/1
0.5 CAPSULE, LIQUID FILLED ORAL
Status: DISCONTINUED | OUTPATIENT
Start: 2024-04-10 | End: 2024-04-16 | Stop reason: HOSPADM

## 2024-04-08 RX ORDER — VITAMIN B COMPLEX
1000 TABLET ORAL DAILY
Status: CANCELLED | OUTPATIENT
Start: 2024-04-09

## 2024-04-08 RX ORDER — ISOSORBIDE MONONITRATE 60 MG/1
120 TABLET, EXTENDED RELEASE ORAL 2 TIMES DAILY
Status: DISCONTINUED | OUTPATIENT
Start: 2024-04-08 | End: 2024-04-16 | Stop reason: HOSPADM

## 2024-04-08 RX ADMIN — AMLODIPINE BESYLATE 10 MG: 10 TABLET ORAL at 08:33

## 2024-04-08 RX ADMIN — SERTRALINE 100 MG: 100 TABLET, FILM COATED ORAL at 08:34

## 2024-04-08 RX ADMIN — HEPARIN SODIUM 5000 UNITS: 5000 INJECTION INTRAVENOUS; SUBCUTANEOUS at 13:30

## 2024-04-08 RX ADMIN — Medication 360 MG: at 16:10

## 2024-04-08 RX ADMIN — SODIUM CHLORIDE, PRESERVATIVE FREE 10 ML: 5 INJECTION INTRAVENOUS at 08:34

## 2024-04-08 RX ADMIN — CLONIDINE HYDROCHLORIDE 0.3 MG: 0.2 TABLET ORAL at 08:33

## 2024-04-08 RX ADMIN — ISOSORBIDE MONONITRATE 120 MG: 60 TABLET, EXTENDED RELEASE ORAL at 21:49

## 2024-04-08 RX ADMIN — FOLIC ACID 1 MG: 1 TABLET ORAL at 08:34

## 2024-04-08 RX ADMIN — FAMOTIDINE 20 MG: 20 TABLET, FILM COATED ORAL at 08:34

## 2024-04-08 RX ADMIN — FLUTICASONE PROPIONATE 1 SPRAY: 50 SPRAY, METERED NASAL at 08:42

## 2024-04-08 RX ADMIN — ISOSORBIDE MONONITRATE 120 MG: 60 TABLET, EXTENDED RELEASE ORAL at 08:34

## 2024-04-08 RX ADMIN — DOXAZOSIN 4 MG: 4 TABLET ORAL at 21:48

## 2024-04-08 RX ADMIN — HYDRALAZINE HYDROCHLORIDE 100 MG: 50 TABLET ORAL at 22:02

## 2024-04-08 RX ADMIN — HYDRALAZINE HYDROCHLORIDE 100 MG: 50 TABLET ORAL at 16:10

## 2024-04-08 RX ADMIN — HYDRALAZINE HYDROCHLORIDE 100 MG: 50 TABLET ORAL at 08:34

## 2024-04-08 RX ADMIN — CLONIDINE HYDROCHLORIDE 0.3 MG: 0.2 TABLET ORAL at 13:27

## 2024-04-08 RX ADMIN — CALCITRIOL CAPSULES 0.25 MCG 0.5 MCG: 0.25 CAPSULE ORAL at 08:33

## 2024-04-08 RX ADMIN — CINACALCET HYDROCHLORIDE 90 MG: 30 TABLET, FILM COATED ORAL at 08:33

## 2024-04-08 RX ADMIN — Medication 360 MG: at 08:33

## 2024-04-08 RX ADMIN — Medication 1000 UNITS: at 08:34

## 2024-04-08 RX ADMIN — ATORVASTATIN CALCIUM 40 MG: 40 TABLET, FILM COATED ORAL at 21:48

## 2024-04-08 RX ADMIN — ASPIRIN 81 MG: 81 TABLET, COATED ORAL at 08:33

## 2024-04-08 RX ADMIN — HEPARIN SODIUM 5000 UNITS: 5000 INJECTION INTRAVENOUS; SUBCUTANEOUS at 06:22

## 2024-04-08 RX ADMIN — DOXAZOSIN 4 MG: 4 TABLET ORAL at 08:34

## 2024-04-08 RX ADMIN — Medication 400 UNITS: at 08:34

## 2024-04-08 RX ADMIN — CLONIDINE HYDROCHLORIDE 0.3 MG: 0.2 TABLET ORAL at 21:49

## 2024-04-08 ASSESSMENT — PATIENT HEALTH QUESTIONNAIRE - PHQ9
2. FEELING DOWN, DEPRESSED OR HOPELESS: SEVERAL DAYS
SUM OF ALL RESPONSES TO PHQ QUESTIONS 1-9: 2
1. LITTLE INTEREST OR PLEASURE IN DOING THINGS: SEVERAL DAYS
SUM OF ALL RESPONSES TO PHQ QUESTIONS 1-9: 2
SUM OF ALL RESPONSES TO PHQ9 QUESTIONS 1 & 2: 2
SUM OF ALL RESPONSES TO PHQ QUESTIONS 1-9: 2
SUM OF ALL RESPONSES TO PHQ QUESTIONS 1-9: 2

## 2024-04-08 ASSESSMENT — SLEEP AND FATIGUE QUESTIONNAIRES
AVERAGE NUMBER OF SLEEP HOURS: 3
SLEEP PATTERN: DIFFICULTY FALLING ASLEEP
DO YOU USE A SLEEP AID: NO
DO YOU HAVE DIFFICULTY SLEEPING: YES

## 2024-04-08 ASSESSMENT — LIFESTYLE VARIABLES
HOW MANY STANDARD DRINKS CONTAINING ALCOHOL DO YOU HAVE ON A TYPICAL DAY: 3 OR 4
HOW OFTEN DO YOU HAVE A DRINK CONTAINING ALCOHOL: 2-4 TIMES A MONTH

## 2024-04-08 NOTE — PROGRESS NOTES
Patient discharged to 4E psych unit. Transported via wheelchair by [facility] police. Report given to Anna HAQUE. Pt transported in stable condition

## 2024-04-08 NOTE — PROGRESS NOTES
Pharmacy Medication History Note      List of current medications patient is taking is complete.    Source of information: patient, med bottles, and outside dispense history    Changes made to medication list:  Medications removed (include reason, ex. therapy complete or physician discontinued):  Medications reported not taking need refills- pt ran out about a month ago     Medications added/doses adjusted:  Pt reports taking calcitriol and cinacalcet during dialysis, typically on T,TH,Sa  Adjusted doxazosin directions- pt reports taking doxazosin 4mg, 1 tab daily   Adjusted imdur directions to 1 tab daily     Other notes (ex. Recent course of antibiotics, Coumadin dosing):  Medications the patient is not taking will need refills sent to pharmacy- pt has been out for about a month  Denies use of other OTC or herbal medications.    Allergies reviewed    Electronically signed by Ana Paula Mcguire on 4/8/2024 at 1:22 PM

## 2024-04-08 NOTE — PROGRESS NOTES
[facility]  PHYSICAL THERAPY MISSED TREATMENT NOTE  [facility] CCU-STEPDOWN 3B    Date: 2024  Patient Name: Trav Jennings        MRN: [id number]   : 1965  (59 y.o.)  Gender: male                REASON FOR MISSED TREATMENT:  Missed Treat.      Per RN, pt off floor for hemodialysis and then being discharged to . Will check back as able.

## 2024-04-08 NOTE — PLAN OF CARE
chronic conditions and comorbid symptoms for stability, deterioration, or improvement   Collaborate with multidisciplinary team to address chronic and comorbid conditions and prevent exacerbation or deterioration   Update acute care plan with appropriate goals if chronic or comorbid symptoms are exacerbated and prevent overall improvement and discharge     Problem: Safety - Adult  Goal: Free from fall injury  4/8/2024 0110 by Yusuf Morales RN  Outcome: Progressing  Note: Bed locked & in low position, call light in reach, side-rails up x2, bed/chair alarm utilized, non-slip socks on when ambulating, reminded patient to use call light to call for assistance.      Problem: Cardiovascular - Adult  Goal: Maintains optimal cardiac output and hemodynamic stability  4/8/2024 0110 by Yusuf Morales RN  Outcome: Progressing  Flowsheets (Taken 4/8/2024 0110)  Maintains optimal cardiac output and hemodynamic stability:   Monitor blood pressure and heart rate   Monitor urine output and notify Licensed Independent Practitioner for values outside of normal range   Assess for signs of decreased cardiac output     Problem: Cardiovascular - Adult  Goal: Absence of cardiac dysrhythmias or at baseline  4/8/2024 0110 by Yusuf Morales RN  Outcome: Progressing  Flowsheets (Taken 4/8/2024 0110)  Absence of cardiac dysrhythmias or at baseline:   Monitor cardiac rate and rhythm   Assess for signs of decreased cardiac output     Problem: Metabolic/Fluid and Electrolytes - Adult  Goal: Electrolytes maintained within normal limits  4/8/2024 0110 by Yusuf Morales RN  Outcome: Progressing  Flowsheets (Taken 4/8/2024 0110)  Electrolytes maintained within normal limits: Monitor labs and assess patient for signs and symptoms of electrolyte imbalances     Problem: Metabolic/Fluid and Electrolytes - Adult  Goal: Hemodynamic stability and optimal renal function maintained  4/8/2024 0110 by Yusuf Morales RN  Outcome: Progressing  Flowsheets (Taken

## 2024-04-08 NOTE — PROGRESS NOTES
Daily Progress Note  Ian Holm MD  4/8/2024    Reviewed patient's current plan of care and vital signs with nursing staff.  Patient is still feeling depressed with fleeting suicidal thoughts.  He would like to go to our unit.    SUBJECTIVE:    Patient is feeling unchanged.  Suicidal ideation: Passive without plan.  ROS: Patient has new complaints:  No  Sleeping adequately:  No      Mental Status Examination:  Patient is cooperative. Speech: Normal rate and tone.  No abnormal movements, tics or mannerisms.  Mood depressed; affect restricted. Suicidal ideation fleeting.  Homicidal ideations Absent.   Hallucinations Absent.  Delusions Absent. Thought Content: normal. Thought Processes: Goal-Directed. Alert and oriented X 3.  Attention and concentration Fair. MEMORY intact.  Insight and Judgement limited.      Medications  Current Facility-Administered Medications: [START ON 4/9/2024] epoetin qiana-epbx (RETACRIT) injection 6,000 Units, 6,000 Units, SubCUTAneous, Once per day on Tue Thu Sat  cinacalcet (SENSIPAR) tablet 90 mg, 90 mg, Oral, Once per day on Mon Wed Fri  heparin (porcine) injection 5,000 Units, 5,000 Units, SubCUTAneous, 3 times per day  doxazosin (CARDURA) tablet 4 mg, 4 mg, Oral, 2 times per day  isosorbide mononitrate (IMDUR) extended release tablet 120 mg, 120 mg, Oral, BID  sodium chloride flush 0.9 % injection 5-40 mL, 5-40 mL, IntraVENous, 2 times per day  sodium chloride flush 0.9 % injection 10 mL, 10 mL, IntraVENous, PRN  0.9 % sodium chloride infusion, , IntraVENous, PRN  ondansetron (ZOFRAN-ODT) disintegrating tablet 4 mg, 4 mg, Oral, Q8H PRN **OR** ondansetron (ZOFRAN) injection 4 mg, 4 mg, IntraVENous, Q6H PRN  polyethylene glycol (GLYCOLAX) packet 17 g, 17 g, Oral, Daily PRN  acetaminophen (TYLENOL) tablet 650 mg, 650 mg, Oral, Q6H PRN **OR** acetaminophen (TYLENOL) suppository 650 mg, 650 mg, Rectal, Q6H PRN  albuterol sulfate HFA (PROVENTIL;VENTOLIN;PROAIR) 108 (90 Base) MCG/ACT inhaler 2

## 2024-04-08 NOTE — DISCHARGE SUMMARY
Hospitalist Discharge Summary        Patient: Trav Jennings  YOB: 1965  MRN: [id number]   Acct: [id number]    Primary Care Physician: Radha Hou APRN - CNP    Admit date  4/6/2024    Discharge date:  4/8/2024 11:02 AM    Chief Complaint on presentation :-    \"My life is in shambles\"    Discharge Assessment and Plan:-   Suicidal ideation with intent  Patient states that he continues to have suicidal thoughts.  He has thought about how he would commit suicide including jumping off a building, stepping from traffic, or slitting his wrist/fistula.  Continue suicide precautions and one-to-one bedside sitter while in the acute admission.  Plan discharge to 40 inpatient psychiatric unit.  Psychiatry following.  Hyperkalemia  Due to ESRD.  Improved with hemodialysis on arrival.  Plan for hemodialysis again today.  May benefit from long-term Lokelma if not cost prohibitive.  ESRD on HD  Plan for HD today.  Will continue with 3 times per week schedule.  Continue Sinemet Calcet and calcitriol.  Nephrology following.  Severe hypertension  Initially required IV drips of Cardene and nitroglycerin.  Has been weaned from those drips for blood pressure much improved.  Cardura dose was increased.  Otherwise home medications have been resumed.  Patient was noncompliant outpatient.  Homelessness  May require the assistance of [facility] pending his benefits.  Plan discharge to 4 ED inpatient psychiatric unit.  Social work is following to assist with outpatient options.  Polysubstance abuse  Admits to cocaine use.  States multiple family members also use illicit drugs making differential for him to quit.  Would like a inpatient rehab stay away from family to assist with abstinence.  Chronic conditions:  Chronic hypoxic respiratory failure  Chronic REZA noncompliant with CPAP  AAA status post repair    Initial H and P and Hospital course:-  Patient is a 59-year-old male with ESRD and uncontrolled hypertension who    Anion Gap    Collection Time: 04/08/24  4:15 AM   Result Value Ref Range    Anion Gap 14.0 8.0 - 16.0 meq/L   Glomerular Filtration Rate, Estimated    Collection Time: 04/08/24  4:15 AM   Result Value Ref Range    Est, Glom Filt Rate 7 (A) >60 ml/min/1.73m2        Microbiology:    Blood culture #1:   Lab Results   Component Value Date/Time    BC  02/04/2024 12:58 PM     No growth 24 hours. No growth 48 hours. No growth at 5 days       Blood culture #2:No results found for: \"BLOODCULT2\"    Organism:    Lab Results   Component Value Date/Time    LABGRAM  03/18/2024 11:24 AM     Rare segmented neutrophils observed. Rare epithelial cells observed. No bacteria seen.       MRSA culture only:No results found for: \"MRSAC\"    Urine culture: No results found for: \"LABURIN\"  Lab Results   Component Value Date/Time    ORG Staphylococcus epidermidis 10/10/2022 03:25 PM        Respiratory culture: No results found for: \"CULTRESP\"    Aerobic and Anaerobic :  Lab Results   Component Value Date/Time    LABAERO No growth-preliminary No growth 03/18/2024 11:24 AM     Lab Results   Component Value Date/Time    LABANAE No growth-preliminary No growth 03/18/2024 11:24 AM       Urinalysis:      Lab Results   Component Value Date/Time    NITRU NEGATIVE 04/03/2018 07:45 PM    WBCUA 2-4 04/03/2018 07:45 PM    BACTERIA NONE 04/03/2018 07:45 PM    RBCUA 5-10 04/03/2018 07:45 PM    BLOODU SMALL 04/03/2018 07:45 PM    SPECGRAV 1.014 03/07/2018 09:10 PM    GLUCOSEU NEGATIVE 04/03/2018 07:45 PM       Radiology:-  XR CHEST PORTABLE    Result Date: 4/6/2024  PROCEDURE: XR CHEST PORTABLE CLINICAL INFORMATION: Chest pain, hypertension COMPARISON: Chest radiograph 4/2/2024 TECHNIQUE: AP portable chest radiograph performed. FINDINGS: There is groundglass opacification of both lungs with increased interstitial markings. Surgical clips are seen in the neck. A thoracic aortic stent graft is noted. Cardiac silhouette is mildly enlarged. Possible small

## 2024-04-08 NOTE — CONSULTS
of being noncompliant on psychotropics and outpatient followups.  He has been on escitalopram, venlafaxine, and sertraline in the past.  Again, he has not been taking sertraline for 1 year.  Sertraline was started in 2018.  He has a long history of suicidal ideation, but no history of suicide attempt.  He reports a history of depressive symptoms since he was 20 years old after his mother , but he has a history of illicit drug and alcohol since he was 14 years old.  It appears that his depressive symptoms are related to his drug use.    FAMILY HISTORY:  One sister is an alcoholic.  He denies family history of suicide attempt and no family history of illicit drugs.    SOCIAL HISTORY:  Patient was born and raised in [place].  Parents were .  His father was murdered when he was 6 years old.  His mother  in .  He has 3 brothers and 3 sisters.  He is the youngest.  He has some contact with his siblings.  He graduated from high school.  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  He has been  for 24 years, but  since last October.  It seems that he has been with his wife on and off.  He has been homeless since last October.  He has 2 daughters from 2 different relationships.  His youngest daughter who is about 14 lives here in [place].  He did work at [employer] for 12 years.  He has been disabled for 9 years.  He has worked mostly in factories.  He has a history of alcohol, started when he was 14 years old.  He started drinking heavy by 17.  He has a tendency to drink until he passed out, but he says lately he has been drinking 2 Tallboys daily.  He has a history of cannabis when he was 10 years old and by 14 years old, he was abusing cocaine.  In his late 20s, he started using crack cocaine.  He does not want to elaborate how much he is using.  It is not clear how long he was sober from cocaine.  He has been to different rehabs in the past.  He denies legal issues.  He  smokes 1 pack of cigarettes every day.  He does not have a spiritual life.    MEDICAL AND SURGICAL HISTORY:  Hypertension; coronary artery disease; CHF; end-stage renal disease, on hemodialysis; arthritis; diabetes mellitus; dyslipidemia; left renal stenosis; dialysis fistula.    MEDICATIONS:  Amlodipine, aspirin, atorvastatin, calcitriol, cinacalcet, clonidine, doxazosin, epoetin qiana, famotidine, iron gluconate, fluticasone, folic acid, heparin, hydralazine, isosorbide mononitrate, sertraline 100 mg daily, vitamin D, vitamin E, acetaminophen, albuterol sulfate, ondansetron, polyethylene glycol, trazodone 50 mg at bedtime as needed.    ALLERGIES:  HUMALOG INSULIN, LISINOPRIL.      MENTAL STATUS EXAMINATION:  The patient appears stated age.  Dressed in hospital gown.  He has good eye contact, poor grooming, and fair hygiene.  He is somewhat uncooperative with the interview.  Speech clear, coherent, and spontaneous.  Mood depressed and irritable, and affect restricted.  He has suicidal thought with different plans to hurt himself.  No homicidal ideation.  No psychosis.  He has some mood swings.  No flight of ideas and no racing thoughts.  Thought process is goal oriented.  He is alert and oriented x3.  He has fair attention and concentration.  Memory appears to be intact as tested within the context of the interview.  Intelligence appears average.  Judgment and insight are poor.    DIAGNOSES:    1. Substance-induced mood disorder.  2. Cocaine use disorder, severe.  3. Alcohol use disorder, severe.  4. Rule out major depressive disorder, recurrent, severe.  5. See medical and surgical history.  6. Chronic substance use, poor social support, homelessness.    ASSESSMENT:  Patient is a 59-year-old   male.  He presented to [facility] due to high blood pressure.  He has a history of multiple medical issues.  He also has a long history of crack cocaine and alcohol.  He stated that he

## 2024-04-08 NOTE — PROGRESS NOTES
[facility]   Nephrology progress note  4/8/2024, 9:22 AM      Pt Name:    Trav Jennings  MRN:     [id number]     YOB: 1965  Admit Date:    4/6/2024 12:35 PM    Chief Complaint: Nephrology following for ESRD and HD    Subjective:  Patient was seen and examined this morning  No chest pain or shortness of breath      Objective:  24HR INTAKE/OUTPUT:    Intake/Output Summary (Last 24 hours) at 4/8/2024 0922  Last data filed at 4/8/2024 0253  Gross per 24 hour   Intake 890 ml   Output 0 ml   Net 890 ml           I/O last 3 completed shifts:  In: 1280 [P.O.:1280]  Out: 3000   No intake/output data recorded.   Admission weight: 96.2 kg (212 lb 1.3 oz)  Wt Readings from Last 3 Encounters:   04/08/24 92.3 kg (203 lb 8 oz)   04/05/24 96.2 kg (212 lb 1.3 oz)   03/10/24 93.9 kg (207 lb)        Vitals :   Vitals:    04/07/24 2257 04/08/24 0253 04/08/24 0635 04/08/24 0812   BP: (!) 128/54 (!) 144/52  (!) 160/63   Pulse: 69 68  76   Resp: 15 16  18   Temp: 98.4 °F (36.9 °C) 98.5 °F (36.9 °C)     TempSrc: Oral Oral     SpO2: 99% 98%  100%   Weight:  94.6 kg (208 lb 9.6 oz) 92.3 kg (203 lb 8 oz)        Physical examination  General Appearance: alert and cooperative with exam, appears comfortable, no distress  Mouth/Throat: Oral mucosa moist  Neck: No JVD  Lungs: Air entry B/L, no rales, no use of accessory muscles  GI: soft, non-tender, no guarding  Extremities.  Improved  Musculoskeletal.  Left upper extremity AV fistula  Medications:  Infusion:    sodium chloride       Meds:    cinacalcet  90 mg Oral Once per day on Mon Wed Fri    heparin (porcine)  5,000 Units SubCUTAneous 3 times per day    doxazosin  4 mg Oral 2 times per day    isosorbide mononitrate  120 mg Oral BID    sodium chloride flush  5-40 mL IntraVENous 2 times per day    amLODIPine  10 mg Oral Daily    aspirin  81 mg Oral Daily    atorvastatin  40 mg Oral Nightly    cloNIDine  0.3 mg Oral TID    famotidine  20 mg Oral Daily

## 2024-04-08 NOTE — FLOWSHEET NOTE
04/08/24 0812 04/08/24 1240   Vital Signs   BP (!) 160/63 (!) 152/80   Temp  --  98.4 °F (36.9 °C)   Pulse 76 80   Respirations 18 18   SpO2 100 %  --    Weight - Scale  --  91.3 kg (201 lb 4.5 oz)   Weight Method  --  Bed scale   Percent Weight Change  --  -1.09   Post-Hemodialysis Assessment   Post-Treatment Procedures  --  Blood returned;Access bleeding time > 10 minutes   Machine Disinfection Process  --  Acid/Vinegar Clean;Heat Disinfect;Exterior Machine Disinfection   Blood Volume Processed (Liters)  --  102.1 L   Dialyzer Clearance  --  Lightly streaked   Duration of Treatment (minutes)  --  210 minutes   Hemodialysis Intake (ml)  --  300 ml   Hemodialysis Output (ml)  --  1300 ml   NET Removed (ml)  --  1000   Tolerated Treatment  --  Good   Observations & Evaluations   O2 Device Nasal cannula  --      Stable 3 and half hour treatment. removed 1 liter of fluid, patient tolerated well. Pressure held to needle sites for ten mins each. dressing clean,dry and intact. Report called to primary RN. Treatment recorded printed to be scanned into EMR.

## 2024-04-08 NOTE — CARE COORDINATION
4/8/24, 8:17 AM EDT    DISCHARGE PLANNING EVALUATION    SW consult deferred, patient will go to 4E when medically stable.

## 2024-04-09 LAB
ANION GAP SERPL CALC-SCNC: 13 MEQ/L (ref 8–16)
BASOPHILS ABSOLUTE: 0 THOU/MM3 (ref 0–0.1)
BASOPHILS NFR BLD AUTO: 0.2 %
BUN SERPL-MCNC: 46 MG/DL (ref 7–22)
CALCIUM SERPL-MCNC: 7.1 MG/DL (ref 8.5–10.5)
CHLORIDE SERPL-SCNC: 94 MEQ/L (ref 98–111)
CO2 SERPL-SCNC: 32 MEQ/L (ref 23–33)
CREAT SERPL-MCNC: 6.7 MG/DL (ref 0.4–1.2)
DEPRECATED RDW RBC AUTO: 52.4 FL (ref 35–45)
EOSINOPHIL NFR BLD AUTO: 2 %
EOSINOPHILS ABSOLUTE: 0.1 THOU/MM3 (ref 0–0.4)
ERYTHROCYTE [DISTWIDTH] IN BLOOD BY AUTOMATED COUNT: 13.5 % (ref 11.5–14.5)
GFR SERPL CREATININE-BSD FRML MDRD: 9 ML/MIN/1.73M2
GLUCOSE SERPL-MCNC: 87 MG/DL (ref 70–108)
HCT VFR BLD AUTO: 30.3 % (ref 42–52)
HGB BLD-MCNC: 9.5 GM/DL (ref 14–18)
IMM GRANULOCYTES # BLD AUTO: 0.01 THOU/MM3 (ref 0–0.07)
IMM GRANULOCYTES NFR BLD AUTO: 0.2 %
LYMPHOCYTES ABSOLUTE: 0.7 THOU/MM3 (ref 1–4.8)
LYMPHOCYTES NFR BLD AUTO: 16 %
MCH RBC QN AUTO: 33.3 PG (ref 26–33)
MCHC RBC AUTO-ENTMCNC: 31.4 GM/DL (ref 32.2–35.5)
MCV RBC AUTO: 106.3 FL (ref 80–94)
MONOCYTES ABSOLUTE: 0.4 THOU/MM3 (ref 0.4–1.3)
MONOCYTES NFR BLD AUTO: 10.1 %
NEUTROPHILS NFR BLD AUTO: 71.5 %
NRBC BLD AUTO-RTO: 0 /100 WBC
PLATELET # BLD AUTO: 100 THOU/MM3 (ref 130–400)
PMV BLD AUTO: 10.2 FL (ref 9.4–12.4)
POTASSIUM SERPL-SCNC: 5.7 MEQ/L (ref 3.5–5.2)
RBC # BLD AUTO: 2.85 MILL/MM3 (ref 4.7–6.1)
SEGMENTED NEUTROPHILS ABSOLUTE COUNT: 2.9 THOU/MM3 (ref 1.8–7.7)
SODIUM SERPL-SCNC: 139 MEQ/L (ref 135–145)
WBC # BLD AUTO: 4.1 THOU/MM3 (ref 4.8–10.8)

## 2024-04-09 PROCEDURE — 6370000000 HC RX 637 (ALT 250 FOR IP)

## 2024-04-09 PROCEDURE — 85025 COMPLETE CBC W/AUTO DIFF WBC: CPT

## 2024-04-09 PROCEDURE — 6370000000 HC RX 637 (ALT 250 FOR IP): Performed by: STUDENT IN AN ORGANIZED HEALTH CARE EDUCATION/TRAINING PROGRAM

## 2024-04-09 PROCEDURE — 5A1D70Z PERFORMANCE OF URINARY FILTRATION, INTERMITTENT, LESS THAN 6 HOURS PER DAY: ICD-10-PCS | Performed by: INTERNAL MEDICINE

## 2024-04-09 PROCEDURE — 6370000000 HC RX 637 (ALT 250 FOR IP): Performed by: PSYCHIATRY & NEUROLOGY

## 2024-04-09 PROCEDURE — 36415 COLL VENOUS BLD VENIPUNCTURE: CPT

## 2024-04-09 PROCEDURE — 90935 HEMODIALYSIS ONE EVALUATION: CPT

## 2024-04-09 PROCEDURE — 1240000000 HC EMOTIONAL WELLNESS R&B

## 2024-04-09 PROCEDURE — 6360000002 HC RX W HCPCS: Performed by: STUDENT IN AN ORGANIZED HEALTH CARE EDUCATION/TRAINING PROGRAM

## 2024-04-09 PROCEDURE — 6370000000 HC RX 637 (ALT 250 FOR IP): Performed by: INTERNAL MEDICINE

## 2024-04-09 PROCEDURE — 80048 BASIC METABOLIC PNL TOTAL CA: CPT

## 2024-04-09 PROCEDURE — 99221 1ST HOSP IP/OBS SF/LOW 40: CPT | Performed by: INTERNAL MEDICINE

## 2024-04-09 RX ORDER — TRAZODONE HYDROCHLORIDE 100 MG/1
100 TABLET ORAL NIGHTLY PRN
Status: DISCONTINUED | OUTPATIENT
Start: 2024-04-09 | End: 2024-04-16 | Stop reason: HOSPADM

## 2024-04-09 RX ADMIN — TRAZODONE HYDROCHLORIDE 100 MG: 100 TABLET ORAL at 23:27

## 2024-04-09 RX ADMIN — EPOETIN ALFA-EPBX 6000 UNITS: 3000 INJECTION, SOLUTION INTRAVENOUS; SUBCUTANEOUS at 17:15

## 2024-04-09 RX ADMIN — SODIUM ZIRCONIUM CYCLOSILICATE 10 G: 10 POWDER, FOR SUSPENSION ORAL at 21:54

## 2024-04-09 RX ADMIN — Medication 1000 UNITS: at 12:33

## 2024-04-09 RX ADMIN — ASPIRIN 81 MG: 81 TABLET, COATED ORAL at 12:33

## 2024-04-09 RX ADMIN — Medication 360 MG: at 12:34

## 2024-04-09 RX ADMIN — FLUTICASONE PROPIONATE 1 SPRAY: 50 SPRAY, METERED NASAL at 12:34

## 2024-04-09 RX ADMIN — AMLODIPINE BESYLATE 10 MG: 10 TABLET ORAL at 12:33

## 2024-04-09 RX ADMIN — ATORVASTATIN CALCIUM 40 MG: 40 TABLET, FILM COATED ORAL at 21:54

## 2024-04-09 RX ADMIN — SODIUM ZIRCONIUM CYCLOSILICATE 10 G: 10 POWDER, FOR SUSPENSION ORAL at 12:43

## 2024-04-09 RX ADMIN — CLONIDINE HYDROCHLORIDE 0.3 MG: 0.2 TABLET ORAL at 12:34

## 2024-04-09 RX ADMIN — Medication 360 MG: at 16:37

## 2024-04-09 RX ADMIN — CLONIDINE HYDROCHLORIDE 0.3 MG: 0.2 TABLET ORAL at 16:37

## 2024-04-09 RX ADMIN — ISOSORBIDE MONONITRATE 120 MG: 60 TABLET, EXTENDED RELEASE ORAL at 12:33

## 2024-04-09 RX ADMIN — HYDRALAZINE HYDROCHLORIDE 100 MG: 50 TABLET ORAL at 23:27

## 2024-04-09 RX ADMIN — FAMOTIDINE 20 MG: 20 TABLET, FILM COATED ORAL at 12:34

## 2024-04-09 RX ADMIN — HYDRALAZINE HYDROCHLORIDE 100 MG: 50 TABLET ORAL at 16:43

## 2024-04-09 RX ADMIN — FOLIC ACID 1 MG: 1 TABLET ORAL at 12:34

## 2024-04-09 RX ADMIN — TRAZODONE HYDROCHLORIDE 50 MG: 50 TABLET ORAL at 01:25

## 2024-04-09 RX ADMIN — HYDRALAZINE HYDROCHLORIDE 100 MG: 50 TABLET ORAL at 12:33

## 2024-04-09 RX ADMIN — Medication 400 UNITS: at 12:33

## 2024-04-09 RX ADMIN — SERTRALINE 100 MG: 100 TABLET, FILM COATED ORAL at 12:34

## 2024-04-09 RX ADMIN — DOXAZOSIN 4 MG: 4 TABLET ORAL at 12:34

## 2024-04-09 ASSESSMENT — PAIN DESCRIPTION - DESCRIPTORS: DESCRIPTORS: ACHING

## 2024-04-09 ASSESSMENT — PAIN DESCRIPTION - ONSET: ONSET: ON-GOING

## 2024-04-09 ASSESSMENT — PAIN SCALES - GENERAL: PAINLEVEL_OUTOF10: 6

## 2024-04-09 ASSESSMENT — PAIN DESCRIPTION - LOCATION: LOCATION: ARM

## 2024-04-09 ASSESSMENT — PAIN DESCRIPTION - ORIENTATION: ORIENTATION: RIGHT

## 2024-04-09 ASSESSMENT — PAIN - FUNCTIONAL ASSESSMENT: PAIN_FUNCTIONAL_ASSESSMENT: ACTIVITIES ARE NOT PREVENTED

## 2024-04-09 ASSESSMENT — PAIN DESCRIPTION - PAIN TYPE: TYPE: ACUTE PAIN

## 2024-04-09 ASSESSMENT — PAIN DESCRIPTION - FREQUENCY: FREQUENCY: INTERMITTENT

## 2024-04-09 NOTE — PATIENT CARE CONFERENCE
Behavioral Health  Initial Interdisciplinary Treatment Plan NOTE    REVIEW DATE AND TIME: 4/9/24 1435    PATIENT was IN TREATMENT TEAM.  See Multidisciplinary Treatment Team sheet for participants.    ADMISSION TYPE:   Admission Type: Involuntary    REASON FOR ADMISSION:  Reason for Admission: Pt stated \"I want to get my head back straight I am seeing and hearing stuff I dont know who is who anymore I dont like the way my life is going\"      Estimated Length of Stay Update:  3-5 days  Estimated Discharge Date Update: 4/10/24    Patient Strengths/Barriers  Strengths (Must Choose Two): Motivation level for treatment  Barriers: Support from family, Independent living  Addictive Behavior:Addictive Behavior  In the Past 3 Months, Have You Felt or Has Someone Told You That You Have a Problem With  : None  Medical Problems:  Past Medical History:   Diagnosis Date    AAA (abdominal aortic aneurysm) ([facility])     NURIS (acute kidney injury) ([facility]) 9/24/2015    Anemia associated with chronic renal failure     Arthritis     stated in hands    Cocaine abuse ([facility]) 5/10/2014    Depression     Diabetes mellitus ([facility])     pt states he no longer has diabetes he has lost alot of davion.     FSGS (focal segmental glomerulosclerosis) 5/23/2013    Hemodialysis patient ([facility]) 10/17/2016    on hemodialysis with [facility]    Hemorrhoids 1/16/2012    History of blood transfusion     Hyperlipidemia     Hyperphosphatemia 5/21/2016    Hypertension     Left renal artery stenosis ([facility]) 5/22/2014    Monoclonal (M) protein disease, multiple 'M' protein     Nicotine dependence 6/16/2014    Noncompliance     Pneumonia     Psychiatric problem     PTSD (post-traumatic stress disorder)     [place] vet from [place]    Secondary hyperparathyroidism (of renal origin)     Sleep apnea        EDUCATION:   Learner Progress Toward Treatment Goals: Reviewed results and recommendations of this team, Reviewed group plan and strategies, Reviewed

## 2024-04-09 NOTE — CONSULTS
CL 99 97* 96* 94*   CO2 22* 26 27 32   BUN 60* 34* 54* 46*   CREATININE 8.2* 5.8* 8.7* 6.7*   GLUCOSE 87 95 86 87   CALCIUM 8.4* 7.9* 7.8* 7.1*   MG 2.5*  --   --   --      Hepatic:   Recent Labs     04/06/24  1340   LABALBU 4.0   AST 14   ALT 10*   BILITOT 0.3   ALKPHOS 134*     Impression and Plan:  ESRD on hemodialysis  Plan dialysis treatment today for hyperkalemia and then will plan to dialyze him Mondays, Wednesdays and Fridays while inpatient.  Typically patient gets dialyzed TTS as outpatient  Discussed with patient and he is in agreement.  Advised low potassium diet.  Orders placed in the chart  Also reviewed with HD RN.  Will plan to repeat dialysis treatment tomorrow  Hyperkalemia.  Will correct with hemodialysis, added low potassium diet.  Add Lokelma  Hypertensive urgency.  Improved blood pressure currently in systolic 120s to 130s.  Planning further ultrafiltration which should also help improve blood pressure further  Substance use  Anemia in ESRD  Suicidal ideation.  Being followed by psychiatry      D/W patient and HD RN    Thank you for the consult. Please feel free to call me if you have any questions.     David Brown MD  [facility]    This report has been created using voice recognition software. It may contain minor errors which are inherent in voice recognition technology.

## 2024-04-09 NOTE — FLOWSHEET NOTE
04/09/24 0745 04/09/24 1148   Vital Signs   BP (!) 142/72 (!) 181/81   Temp 98.4 °F (36.9 °C) 98.3 °F (36.8 °C)   Pulse 98 81   Respirations 18 18   Weight - Scale 94 kg (207 lb 3.7 oz) 92.5 kg (203 lb 14.8 oz)   Weight Method Standing scale  --    Percent Weight Change 3.1 -1.6   Pain Assessment   Pain Assessment None - Denies Pain None - Denies Pain   Post-Hemodialysis Assessment   Post-Treatment Procedures  --  Blood returned;Access bleeding time < 10 minutes   Machine Disinfection Process  --  Acid/Vinegar Clean;Heat Disinfect;Exterior Machine Disinfection   Rinseback Volume (ml)  --  400 ml   Blood Volume Processed (Liters)  --  99.1 L   Dialyzer Clearance  --  Lightly streaked   Duration of Treatment (minutes)  --  210 minutes   Hemodialysis Output (ml)  --  1500 ml   Tolerated Treatment  --  Good     completed 3.5 hr HD TX and removed 1.5 Liters of fluid. pt tolerated Dialysis TX well.  pt TX ended, all blood returned with 400 mls rinse back. removed X2 (14g Needles), applied sterile gauze with light pressure for less than 10 min. pt returned to his room with his left, upper arm dressing clean, dry and intact. report given to primary nurse, record completed. and printed, to be scanned into pt's EMR.

## 2024-04-10 LAB
ANION GAP SERPL CALC-SCNC: 10 MEQ/L (ref 8–16)
BUN SERPL-MCNC: 40 MG/DL (ref 7–22)
CALCIUM SERPL-MCNC: 8.1 MG/DL (ref 8.5–10.5)
CHLORIDE SERPL-SCNC: 101 MEQ/L (ref 98–111)
CO2 SERPL-SCNC: 28 MEQ/L (ref 23–33)
CREAT SERPL-MCNC: 6.1 MG/DL (ref 0.4–1.2)
GFR SERPL CREATININE-BSD FRML MDRD: 10 ML/MIN/1.73M2
GLUCOSE SERPL-MCNC: 112 MG/DL (ref 70–108)
POTASSIUM SERPL-SCNC: 4.9 MEQ/L (ref 3.5–5.2)
SODIUM SERPL-SCNC: 139 MEQ/L (ref 135–145)

## 2024-04-10 PROCEDURE — 36415 COLL VENOUS BLD VENIPUNCTURE: CPT

## 2024-04-10 PROCEDURE — 6370000000 HC RX 637 (ALT 250 FOR IP): Performed by: PSYCHIATRY & NEUROLOGY

## 2024-04-10 PROCEDURE — 90935 HEMODIALYSIS ONE EVALUATION: CPT

## 2024-04-10 PROCEDURE — 6370000000 HC RX 637 (ALT 250 FOR IP): Performed by: STUDENT IN AN ORGANIZED HEALTH CARE EDUCATION/TRAINING PROGRAM

## 2024-04-10 PROCEDURE — 1240000000 HC EMOTIONAL WELLNESS R&B

## 2024-04-10 PROCEDURE — 80048 BASIC METABOLIC PNL TOTAL CA: CPT

## 2024-04-10 PROCEDURE — 6370000000 HC RX 637 (ALT 250 FOR IP)

## 2024-04-10 PROCEDURE — 6370000000 HC RX 637 (ALT 250 FOR IP): Performed by: INTERNAL MEDICINE

## 2024-04-10 PROCEDURE — 90935 HEMODIALYSIS ONE EVALUATION: CPT | Performed by: INTERNAL MEDICINE

## 2024-04-10 RX ADMIN — SODIUM ZIRCONIUM CYCLOSILICATE 10 G: 10 POWDER, FOR SUSPENSION ORAL at 21:21

## 2024-04-10 RX ADMIN — FOLIC ACID 1 MG: 1 TABLET ORAL at 12:40

## 2024-04-10 RX ADMIN — ISOSORBIDE MONONITRATE 120 MG: 60 TABLET, EXTENDED RELEASE ORAL at 10:37

## 2024-04-10 RX ADMIN — ATORVASTATIN CALCIUM 40 MG: 40 TABLET, FILM COATED ORAL at 21:16

## 2024-04-10 RX ADMIN — Medication 360 MG: at 12:40

## 2024-04-10 RX ADMIN — ISOSORBIDE MONONITRATE 120 MG: 60 TABLET, EXTENDED RELEASE ORAL at 21:16

## 2024-04-10 RX ADMIN — CLONIDINE HYDROCHLORIDE 0.3 MG: 0.2 TABLET ORAL at 21:16

## 2024-04-10 RX ADMIN — SERTRALINE 100 MG: 100 TABLET, FILM COATED ORAL at 12:41

## 2024-04-10 RX ADMIN — Medication 400 UNITS: at 12:40

## 2024-04-10 RX ADMIN — Medication 360 MG: at 16:22

## 2024-04-10 RX ADMIN — Medication 1000 UNITS: at 12:40

## 2024-04-10 RX ADMIN — ASPIRIN 81 MG: 81 TABLET, COATED ORAL at 12:40

## 2024-04-10 RX ADMIN — DOXAZOSIN 4 MG: 4 TABLET ORAL at 10:38

## 2024-04-10 RX ADMIN — CALCITRIOL CAPSULES 0.25 MCG 0.5 MCG: 0.25 CAPSULE ORAL at 12:40

## 2024-04-10 RX ADMIN — AMLODIPINE BESYLATE 10 MG: 10 TABLET ORAL at 10:37

## 2024-04-10 RX ADMIN — FAMOTIDINE 20 MG: 20 TABLET, FILM COATED ORAL at 12:40

## 2024-04-10 RX ADMIN — SODIUM ZIRCONIUM CYCLOSILICATE 10 G: 10 POWDER, FOR SUSPENSION ORAL at 12:40

## 2024-04-10 RX ADMIN — FLUTICASONE PROPIONATE 1 SPRAY: 50 SPRAY, METERED NASAL at 12:47

## 2024-04-10 RX ADMIN — CINACALCET HYDROCHLORIDE 90 MG: 30 TABLET, FILM COATED ORAL at 12:40

## 2024-04-10 RX ADMIN — DOXAZOSIN 4 MG: 4 TABLET ORAL at 21:16

## 2024-04-10 RX ADMIN — HYDRALAZINE HYDROCHLORIDE 100 MG: 50 TABLET ORAL at 10:37

## 2024-04-10 RX ADMIN — ACETAMINOPHEN 650 MG: 325 TABLET ORAL at 12:40

## 2024-04-10 RX ADMIN — SODIUM ZIRCONIUM CYCLOSILICATE 10 G: 10 POWDER, FOR SUSPENSION ORAL at 16:22

## 2024-04-10 RX ADMIN — CLONIDINE HYDROCHLORIDE 0.3 MG: 0.2 TABLET ORAL at 10:38

## 2024-04-10 ASSESSMENT — PAIN DESCRIPTION - LOCATION: LOCATION: ARM

## 2024-04-10 ASSESSMENT — PAIN SCALES - GENERAL
PAINLEVEL_OUTOF10: 6
PAINLEVEL_OUTOF10: 0
PAINLEVEL_OUTOF10: 0

## 2024-04-10 ASSESSMENT — PAIN DESCRIPTION - DESCRIPTORS: DESCRIPTORS: ACHING

## 2024-04-10 ASSESSMENT — PAIN SCALES - WONG BAKER
WONGBAKER_NUMERICALRESPONSE: NO HURT
WONGBAKER_NUMERICALRESPONSE: NO HURT

## 2024-04-10 ASSESSMENT — PAIN - FUNCTIONAL ASSESSMENT: PAIN_FUNCTIONAL_ASSESSMENT: ACTIVITIES ARE NOT PREVENTED

## 2024-04-10 ASSESSMENT — PAIN DESCRIPTION - ORIENTATION: ORIENTATION: RIGHT

## 2024-04-10 NOTE — FLOWSHEET NOTE
Stable 3.5 hour TX completed. Removed 800 ml of fluid. pt tolerated fluid removal well. Pressure held to each needle site x 15 min. Drsg clean, dry, and intact upon leaving unit. TX record printed for scanning into EMR. Report called to primary RN.    04/10/24 0730 04/10/24 1125   Vital Signs   BP (!) 163/77 (!) 195/91   Temp 98 °F (36.7 °C) 98.3 °F (36.8 °C)   Pulse 94 87   Respirations 18 18   SpO2 98 % 96 %   Weight - Scale 94.8 kg (208 lb 15.9 oz) 94 kg (207 lb 3.7 oz)   Weight Method Bed scale Bed scale   Post-Hemodialysis Assessment   Post-Treatment Procedures  --  Blood returned;Access bleeding time > 10 minutes   Machine Disinfection Process  --  Acid/Vinegar Clean;Heat Disinfect;Exterior Machine Disinfection   Blood Volume Processed (Liters)  --  100.4 L   Dialyzer Clearance  --  Lightly streaked   Duration of Treatment (minutes)  --  210 minutes   Heparin Amount Administered During Treatment (mL)  --  0 mL   Hemodialysis Intake (ml)  --  400 ml   Hemodialysis Output (ml)  --  1200 ml   NET Removed (ml)  --  800   Tolerated Treatment  --  Good

## 2024-04-10 NOTE — PATIENT CARE CONFERENCE
Behavioral Health   Day 3 Interdisciplinary Treatment Plan NOTE    Review Date & Time: 4/10/24 1509    Patient was in treatment team    Admission Type:   Admission Type: Involuntary    Reason for admission:  Reason for Admission: Pt stated \"I want to get my head back straight I am seeing and hearing stuff I dont know who is who anymore I dont like the way my life is going\"  Estimated Length of Stay Update:  2-3 days  Estimated Discharge Date Update: 4/14/24    Patient Strengths/Barriers  Strengths (Must Choose Two): Motivation level for treatment  Barriers: Support from family, Independent living  Addictive Behavior:Addictive Behavior  In the Past 3 Months, Have You Felt or Has Someone Told You That You Have a Problem With  : None  Medical Problems:  Past Medical History:   Diagnosis Date    AAA (abdominal aortic aneurysm) ([facility])     NURIS (acute kidney injury) ([facility]) 9/24/2015    Anemia associated with chronic renal failure     Arthritis     stated in hands    Cocaine abuse ([facility]) 5/10/2014    Depression     Diabetes mellitus ([facility])     pt states he no longer has diabetes he has lost alot of davion.     FSGS (focal segmental glomerulosclerosis) 5/23/2013    Hemodialysis patient ([facility]) 10/17/2016    on hemodialysis with [facility]    Hemorrhoids 1/16/2012    History of blood transfusion     Hyperlipidemia     Hyperphosphatemia 5/21/2016    Hypertension     Left renal artery stenosis ([facility]) 5/22/2014    Monoclonal (M) protein disease, multiple 'M' protein     Nicotine dependence 6/16/2014    Noncompliance     Pneumonia     Psychiatric problem     PTSD (post-traumatic stress disorder)     [facility] vet from [facility]    Secondary hyperparathyroidism (of renal origin)     Sleep apnea        Risk:  Fall Risk   Lucius Scale Lucius Scale Score: 21    Status EXAM:   Mental Status and Behavioral Exam  Normal: No  Level of Assistance: Independent/Self  Facial Expression: Brightened, Flat  Affect: Blunt  Level

## 2024-04-10 NOTE — SIGNIFICANT EVENT
Received perfect serve message about pt's BP today, and in looking through the chart there is a documented POC from 4/9 evening that I was re-consulted. I was simply asked if ok to remove oxygen while pt was 100% SpO2 on 2L. I relayed that information in perfect serve / via phone call to RN Alondra Antoine that it was not necessary to have oxygen on and a re-consult was also not required as pt was just discharged from Hospitalist service and is being managed by Nephrology while on 4E for HD and all clinical issues related to this.     In further review, my name was placed on the pt's chart as Hospitalist despite pt not requiring this service and that information being relayed via phone call on hospital phone. No documentation was created at that time as it was a simple question with a simple answer. Perfect serve messages were sent to treatment team personnel in regard to this to clarify this issue.     Electronically signed by Matheus Lundberg MD on 4/10/24 at 2:03 PM EDT

## 2024-04-11 LAB
ANION GAP SERPL CALC-SCNC: 12 MEQ/L (ref 8–16)
BUN SERPL-MCNC: 33 MG/DL (ref 7–22)
CALCIUM SERPL-MCNC: 7.5 MG/DL (ref 8.5–10.5)
CHLORIDE SERPL-SCNC: 101 MEQ/L (ref 98–111)
CO2 SERPL-SCNC: 28 MEQ/L (ref 23–33)
CREAT SERPL-MCNC: 5.5 MG/DL (ref 0.4–1.2)
GFR SERPL CREATININE-BSD FRML MDRD: 11 ML/MIN/1.73M2
GLUCOSE SERPL-MCNC: 90 MG/DL (ref 70–108)
POTASSIUM SERPL-SCNC: 4.3 MEQ/L (ref 3.5–5.2)
SODIUM SERPL-SCNC: 141 MEQ/L (ref 135–145)

## 2024-04-11 PROCEDURE — 6370000000 HC RX 637 (ALT 250 FOR IP): Performed by: PHYSICIAN ASSISTANT

## 2024-04-11 PROCEDURE — 6370000000 HC RX 637 (ALT 250 FOR IP): Performed by: INTERNAL MEDICINE

## 2024-04-11 PROCEDURE — 1240000000 HC EMOTIONAL WELLNESS R&B

## 2024-04-11 PROCEDURE — 6370000000 HC RX 637 (ALT 250 FOR IP): Performed by: STUDENT IN AN ORGANIZED HEALTH CARE EDUCATION/TRAINING PROGRAM

## 2024-04-11 PROCEDURE — 6370000000 HC RX 637 (ALT 250 FOR IP): Performed by: PSYCHIATRY & NEUROLOGY

## 2024-04-11 PROCEDURE — 6370000000 HC RX 637 (ALT 250 FOR IP)

## 2024-04-11 PROCEDURE — 6360000002 HC RX W HCPCS: Performed by: STUDENT IN AN ORGANIZED HEALTH CARE EDUCATION/TRAINING PROGRAM

## 2024-04-11 PROCEDURE — 80048 BASIC METABOLIC PNL TOTAL CA: CPT

## 2024-04-11 PROCEDURE — 36415 COLL VENOUS BLD VENIPUNCTURE: CPT

## 2024-04-11 RX ORDER — ACETAMINOPHEN 325 MG/1
650 TABLET ORAL EVERY 4 HOURS PRN
Status: DISCONTINUED | OUTPATIENT
Start: 2024-04-11 | End: 2024-04-16 | Stop reason: HOSPADM

## 2024-04-11 RX ADMIN — HYDRALAZINE HYDROCHLORIDE 100 MG: 50 TABLET ORAL at 16:48

## 2024-04-11 RX ADMIN — ASPIRIN 81 MG: 81 TABLET, COATED ORAL at 07:52

## 2024-04-11 RX ADMIN — Medication 360 MG: at 16:48

## 2024-04-11 RX ADMIN — FOLIC ACID 1 MG: 1 TABLET ORAL at 07:52

## 2024-04-11 RX ADMIN — SERTRALINE 100 MG: 100 TABLET, FILM COATED ORAL at 07:53

## 2024-04-11 RX ADMIN — FAMOTIDINE 20 MG: 20 TABLET, FILM COATED ORAL at 07:52

## 2024-04-11 RX ADMIN — ISOSORBIDE MONONITRATE 120 MG: 60 TABLET, EXTENDED RELEASE ORAL at 07:54

## 2024-04-11 RX ADMIN — ISOSORBIDE MONONITRATE 120 MG: 60 TABLET, EXTENDED RELEASE ORAL at 21:27

## 2024-04-11 RX ADMIN — Medication 1000 UNITS: at 07:53

## 2024-04-11 RX ADMIN — HYDRALAZINE HYDROCHLORIDE 100 MG: 50 TABLET ORAL at 21:28

## 2024-04-11 RX ADMIN — CLONIDINE HYDROCHLORIDE 0.3 MG: 0.2 TABLET ORAL at 07:54

## 2024-04-11 RX ADMIN — TRAZODONE HYDROCHLORIDE 100 MG: 100 TABLET ORAL at 21:28

## 2024-04-11 RX ADMIN — HYDRALAZINE HYDROCHLORIDE 100 MG: 50 TABLET ORAL at 07:55

## 2024-04-11 RX ADMIN — Medication 400 UNITS: at 07:52

## 2024-04-11 RX ADMIN — CLONIDINE HYDROCHLORIDE 0.3 MG: 0.2 TABLET ORAL at 21:27

## 2024-04-11 RX ADMIN — EPOETIN ALFA-EPBX 6000 UNITS: 3000 INJECTION, SOLUTION INTRAVENOUS; SUBCUTANEOUS at 16:48

## 2024-04-11 RX ADMIN — AMLODIPINE BESYLATE 10 MG: 10 TABLET ORAL at 07:54

## 2024-04-11 RX ADMIN — DOXAZOSIN 4 MG: 4 TABLET ORAL at 07:54

## 2024-04-11 RX ADMIN — ATORVASTATIN CALCIUM 40 MG: 40 TABLET, FILM COATED ORAL at 21:27

## 2024-04-11 RX ADMIN — FLUTICASONE PROPIONATE 1 SPRAY: 50 SPRAY, METERED NASAL at 07:55

## 2024-04-11 RX ADMIN — TRAZODONE HYDROCHLORIDE 100 MG: 100 TABLET ORAL at 00:16

## 2024-04-11 RX ADMIN — DOXAZOSIN 4 MG: 4 TABLET ORAL at 21:31

## 2024-04-11 RX ADMIN — Medication 360 MG: at 11:30

## 2024-04-11 RX ADMIN — SODIUM ZIRCONIUM CYCLOSILICATE 10 G: 10 POWDER, FOR SUSPENSION ORAL at 07:55

## 2024-04-11 RX ADMIN — ACETAMINOPHEN 650 MG: 325 TABLET ORAL at 07:53

## 2024-04-11 RX ADMIN — Medication 360 MG: at 07:53

## 2024-04-11 ASSESSMENT — PAIN SCALES - GENERAL
PAINLEVEL_OUTOF10: 1
PAINLEVEL_OUTOF10: 5

## 2024-04-11 ASSESSMENT — PAIN DESCRIPTION - LOCATION: LOCATION: ARM

## 2024-04-11 ASSESSMENT — PAIN DESCRIPTION - ORIENTATION: ORIENTATION: RIGHT

## 2024-04-11 ASSESSMENT — PAIN DESCRIPTION - DESCRIPTORS: DESCRIPTORS: ACHING

## 2024-04-11 ASSESSMENT — PAIN SCALES - WONG BAKER: WONGBAKER_NUMERICALRESPONSE: NO HURT

## 2024-04-11 NOTE — GROUP NOTE
Group Therapy Note    Date: 4/11/2024    Group Start Time: 1130  Group End Time: 1200  Group Topic: Healthy Living/Wellness    [facility]    Yumiko Rangel LPN        Group Therapy Note    Attendees: 3       Notes:  attended    Status After Intervention:  Improved    Participation Level: Active Listener and Interactive    Participation Quality: Appropriate, Attentive, and Sharing      Speech:  normal      Thought Process/Content: Logical      Affective Functioning: Flat    Level of consciousness:  Alert, Oriented x4, and Attentive      Response to Learning: Able to verbalize current knowledge/experience, Able to verbalize/acknowledge new learning, Able to retain information, and Capable of insight      Endings: None Reported    Modes of Intervention: Education, Support, and Socialization      Discipline Responsible: Licensed Practical Nurse      Signature:  Yumiko Rangel LPN

## 2024-04-12 LAB
ANION GAP SERPL CALC-SCNC: 15 MEQ/L (ref 8–16)
BUN SERPL-MCNC: 53 MG/DL (ref 7–22)
CALCIUM SERPL-MCNC: 7.7 MG/DL (ref 8.5–10.5)
CHLORIDE SERPL-SCNC: 99 MEQ/L (ref 98–111)
CO2 SERPL-SCNC: 26 MEQ/L (ref 23–33)
CREAT SERPL-MCNC: 7.5 MG/DL (ref 0.4–1.2)
GFR SERPL CREATININE-BSD FRML MDRD: 8 ML/MIN/1.73M2
GLUCOSE SERPL-MCNC: 91 MG/DL (ref 70–108)
POTASSIUM SERPL-SCNC: 4.9 MEQ/L (ref 3.5–5.2)
SODIUM SERPL-SCNC: 140 MEQ/L (ref 135–145)

## 2024-04-12 PROCEDURE — 6370000000 HC RX 637 (ALT 250 FOR IP)

## 2024-04-12 PROCEDURE — 36415 COLL VENOUS BLD VENIPUNCTURE: CPT

## 2024-04-12 PROCEDURE — 6370000000 HC RX 637 (ALT 250 FOR IP): Performed by: PHYSICIAN ASSISTANT

## 2024-04-12 PROCEDURE — 90935 HEMODIALYSIS ONE EVALUATION: CPT

## 2024-04-12 PROCEDURE — 1240000000 HC EMOTIONAL WELLNESS R&B

## 2024-04-12 PROCEDURE — 99232 SBSQ HOSP IP/OBS MODERATE 35: CPT | Performed by: INTERNAL MEDICINE

## 2024-04-12 PROCEDURE — 6370000000 HC RX 637 (ALT 250 FOR IP): Performed by: STUDENT IN AN ORGANIZED HEALTH CARE EDUCATION/TRAINING PROGRAM

## 2024-04-12 PROCEDURE — 80048 BASIC METABOLIC PNL TOTAL CA: CPT

## 2024-04-12 PROCEDURE — 6370000000 HC RX 637 (ALT 250 FOR IP): Performed by: PSYCHIATRY & NEUROLOGY

## 2024-04-12 RX ORDER — CARVEDILOL 6.25 MG/1
12.5 TABLET ORAL 2 TIMES DAILY WITH MEALS
Status: DISCONTINUED | OUTPATIENT
Start: 2024-04-12 | End: 2024-04-12

## 2024-04-12 RX ADMIN — CLONIDINE HYDROCHLORIDE 0.3 MG: 0.2 TABLET ORAL at 21:34

## 2024-04-12 RX ADMIN — CALCITRIOL CAPSULES 0.25 MCG 0.5 MCG: 0.25 CAPSULE ORAL at 12:27

## 2024-04-12 RX ADMIN — HYDRALAZINE HYDROCHLORIDE 100 MG: 50 TABLET ORAL at 12:27

## 2024-04-12 RX ADMIN — TRAZODONE HYDROCHLORIDE 100 MG: 100 TABLET ORAL at 21:34

## 2024-04-12 RX ADMIN — ATORVASTATIN CALCIUM 40 MG: 40 TABLET, FILM COATED ORAL at 21:34

## 2024-04-12 RX ADMIN — SERTRALINE 150 MG: 100 TABLET, FILM COATED ORAL at 12:30

## 2024-04-12 RX ADMIN — CLONIDINE HYDROCHLORIDE 0.3 MG: 0.2 TABLET ORAL at 12:23

## 2024-04-12 RX ADMIN — FLUTICASONE PROPIONATE 1 SPRAY: 50 SPRAY, METERED NASAL at 05:29

## 2024-04-12 RX ADMIN — DOXAZOSIN 4 MG: 4 TABLET ORAL at 12:29

## 2024-04-12 RX ADMIN — HYDRALAZINE HYDROCHLORIDE 100 MG: 50 TABLET ORAL at 15:28

## 2024-04-12 RX ADMIN — Medication 1000 UNITS: at 12:27

## 2024-04-12 RX ADMIN — Medication 400 UNITS: at 12:30

## 2024-04-12 RX ADMIN — HYDRALAZINE HYDROCHLORIDE 100 MG: 50 TABLET ORAL at 21:34

## 2024-04-12 RX ADMIN — FOLIC ACID 1 MG: 1 TABLET ORAL at 12:29

## 2024-04-12 RX ADMIN — ISOSORBIDE MONONITRATE 120 MG: 60 TABLET, EXTENDED RELEASE ORAL at 21:34

## 2024-04-12 RX ADMIN — Medication 360 MG: at 15:28

## 2024-04-12 RX ADMIN — AMLODIPINE BESYLATE 10 MG: 10 TABLET ORAL at 12:23

## 2024-04-12 RX ADMIN — CINACALCET HYDROCHLORIDE 90 MG: 30 TABLET, FILM COATED ORAL at 12:24

## 2024-04-12 RX ADMIN — DOXAZOSIN 4 MG: 4 TABLET ORAL at 21:34

## 2024-04-12 RX ADMIN — Medication 360 MG: at 12:24

## 2024-04-12 RX ADMIN — ACETAMINOPHEN 650 MG: 325 TABLET ORAL at 21:34

## 2024-04-12 RX ADMIN — ISOSORBIDE MONONITRATE 120 MG: 60 TABLET, EXTENDED RELEASE ORAL at 12:29

## 2024-04-12 RX ADMIN — ASPIRIN 81 MG: 81 TABLET, COATED ORAL at 12:32

## 2024-04-12 ASSESSMENT — PAIN - FUNCTIONAL ASSESSMENT: PAIN_FUNCTIONAL_ASSESSMENT: ACTIVITIES ARE NOT PREVENTED

## 2024-04-12 ASSESSMENT — PAIN DESCRIPTION - ONSET: ONSET: GRADUAL

## 2024-04-12 ASSESSMENT — PAIN DESCRIPTION - ORIENTATION: ORIENTATION: LEFT

## 2024-04-12 ASSESSMENT — PAIN SCALES - GENERAL
PAINLEVEL_OUTOF10: 5
PAINLEVEL_OUTOF10: 0

## 2024-04-12 ASSESSMENT — PAIN DESCRIPTION - FREQUENCY: FREQUENCY: INTERMITTENT

## 2024-04-12 ASSESSMENT — PAIN DESCRIPTION - LOCATION: LOCATION: ARM

## 2024-04-12 ASSESSMENT — PAIN DESCRIPTION - PAIN TYPE: TYPE: ACUTE PAIN

## 2024-04-12 NOTE — FLOWSHEET NOTE
3.5 hour treatment completed. 4.8 L of fluid removed. Pressure held to access for 10 mins x2. Dressing clean/dry and intact upon leaving the unit. Report given to primary RN. Charting printed and placed in bin to be scanned into EMR.   04/12/24 0805 04/12/24 1205   Vital Signs   BP (!) 195/90 (!) 198/90   Temp 97.7 °F (36.5 °C) 97.7 °F (36.5 °C)   Pulse 87 80   Respirations 18 18   SpO2 96 % 96 %   Weight - Scale 99.7 kg (219 lb 12.8 oz) 94.9 kg (209 lb 3.5 oz)   Weight Method Standing scale Bed scale   Percent Weight Change -0.77 -4.81   Post-Hemodialysis Assessment   Post-Treatment Procedures  --  Blood returned;Access bleeding time < 10 minutes   Machine Disinfection Process  --  Acid/Vinegar Clean;Heat Disinfect;Exterior Machine Disinfection   Blood Volume Processed (Liters)  --  103.4 L   Dialyzer Clearance  --  Lightly streaked   Duration of Treatment (minutes)  --  210 minutes   Hemodialysis Intake (ml)  --  400 ml   Hemodialysis Output (ml)  --  5200 ml   NET Removed (ml)  --  4800   Tolerated Treatment  --  Good

## 2024-04-13 LAB
ANION GAP SERPL CALC-SCNC: 16 MEQ/L (ref 8–16)
BUN SERPL-MCNC: 45 MG/DL (ref 7–22)
CALCIUM SERPL-MCNC: 7.9 MG/DL (ref 8.5–10.5)
CHLORIDE SERPL-SCNC: 103 MEQ/L (ref 98–111)
CO2 SERPL-SCNC: 24 MEQ/L (ref 23–33)
CREAT SERPL-MCNC: 6.9 MG/DL (ref 0.4–1.2)
GFR SERPL CREATININE-BSD FRML MDRD: 9 ML/MIN/1.73M2
GLUCOSE SERPL-MCNC: 96 MG/DL (ref 70–108)
POTASSIUM SERPL-SCNC: 5 MEQ/L (ref 3.5–5.2)
SODIUM SERPL-SCNC: 143 MEQ/L (ref 135–145)

## 2024-04-13 PROCEDURE — 6360000002 HC RX W HCPCS: Performed by: STUDENT IN AN ORGANIZED HEALTH CARE EDUCATION/TRAINING PROGRAM

## 2024-04-13 PROCEDURE — 6370000000 HC RX 637 (ALT 250 FOR IP)

## 2024-04-13 PROCEDURE — 1240000000 HC EMOTIONAL WELLNESS R&B

## 2024-04-13 PROCEDURE — 6370000000 HC RX 637 (ALT 250 FOR IP): Performed by: PHYSICIAN ASSISTANT

## 2024-04-13 PROCEDURE — 80048 BASIC METABOLIC PNL TOTAL CA: CPT

## 2024-04-13 PROCEDURE — 6370000000 HC RX 637 (ALT 250 FOR IP): Performed by: STUDENT IN AN ORGANIZED HEALTH CARE EDUCATION/TRAINING PROGRAM

## 2024-04-13 PROCEDURE — 36415 COLL VENOUS BLD VENIPUNCTURE: CPT

## 2024-04-13 PROCEDURE — 6370000000 HC RX 637 (ALT 250 FOR IP): Performed by: PSYCHIATRY & NEUROLOGY

## 2024-04-13 PROCEDURE — 99232 SBSQ HOSP IP/OBS MODERATE 35: CPT | Performed by: INTERNAL MEDICINE

## 2024-04-13 RX ADMIN — Medication 1000 UNITS: at 08:04

## 2024-04-13 RX ADMIN — CLONIDINE HYDROCHLORIDE 0.3 MG: 0.2 TABLET ORAL at 14:18

## 2024-04-13 RX ADMIN — HYDRALAZINE HYDROCHLORIDE 100 MG: 50 TABLET ORAL at 22:07

## 2024-04-13 RX ADMIN — CLONIDINE HYDROCHLORIDE 0.3 MG: 0.2 TABLET ORAL at 08:04

## 2024-04-13 RX ADMIN — ISOSORBIDE MONONITRATE 120 MG: 60 TABLET, EXTENDED RELEASE ORAL at 08:04

## 2024-04-13 RX ADMIN — ISOSORBIDE MONONITRATE 120 MG: 60 TABLET, EXTENDED RELEASE ORAL at 22:06

## 2024-04-13 RX ADMIN — DOXAZOSIN 4 MG: 4 TABLET ORAL at 22:08

## 2024-04-13 RX ADMIN — FOLIC ACID 1 MG: 1 TABLET ORAL at 08:04

## 2024-04-13 RX ADMIN — Medication 400 UNITS: at 08:04

## 2024-04-13 RX ADMIN — ATORVASTATIN CALCIUM 40 MG: 40 TABLET, FILM COATED ORAL at 22:06

## 2024-04-13 RX ADMIN — Medication 360 MG: at 11:51

## 2024-04-13 RX ADMIN — TRAZODONE HYDROCHLORIDE 100 MG: 100 TABLET ORAL at 22:04

## 2024-04-13 RX ADMIN — EPOETIN ALFA-EPBX 6000 UNITS: 3000 INJECTION, SOLUTION INTRAVENOUS; SUBCUTANEOUS at 17:10

## 2024-04-13 RX ADMIN — Medication 360 MG: at 07:52

## 2024-04-13 RX ADMIN — SERTRALINE 150 MG: 100 TABLET, FILM COATED ORAL at 08:03

## 2024-04-13 RX ADMIN — FAMOTIDINE 20 MG: 20 TABLET, FILM COATED ORAL at 08:04

## 2024-04-13 RX ADMIN — CLONIDINE HYDROCHLORIDE 0.3 MG: 0.2 TABLET ORAL at 22:07

## 2024-04-13 RX ADMIN — Medication 360 MG: at 16:47

## 2024-04-13 RX ADMIN — AMLODIPINE BESYLATE 10 MG: 10 TABLET ORAL at 08:04

## 2024-04-13 RX ADMIN — FLUTICASONE PROPIONATE 1 SPRAY: 50 SPRAY, METERED NASAL at 07:56

## 2024-04-13 RX ADMIN — ASPIRIN 81 MG: 81 TABLET, COATED ORAL at 08:04

## 2024-04-13 RX ADMIN — HYDRALAZINE HYDROCHLORIDE 100 MG: 50 TABLET ORAL at 07:56

## 2024-04-13 RX ADMIN — DOXAZOSIN 4 MG: 4 TABLET ORAL at 08:04

## 2024-04-13 RX ADMIN — HYDRALAZINE HYDROCHLORIDE 100 MG: 50 TABLET ORAL at 16:48

## 2024-04-13 ASSESSMENT — PAIN SCALES - GENERAL
PAINLEVEL_OUTOF10: 0
PAINLEVEL_OUTOF10: 0

## 2024-04-13 NOTE — GROUP NOTE
Group Therapy Note    Date: 4/13/2024    Group Start Time: 0915  Group End Time: 1000  Group Topic: Community Meeting    [facility]    Chetna Sotomayor        Group Therapy Note    Attendees: Patients shared their goals for today & discussed issues on the unit.       Patient's Goal:  To stay positive.     Notes:  Patient was talkative but supportive of his peers today. Offered support to his peers in reaching their goals.    Status After Intervention:  Improved    Participation Level: Active Listener and Interactive    Participation Quality: Appropriate, Attentive, Sharing, and Supportive      Speech:  normal      Thought Process/Content: Linear      Affective Functioning: Congruent      Mood: euthymic      Level of consciousness:  Alert and Attentive      Response to Learning: Able to verbalize current knowledge/experience, Able to verbalize/acknowledge new learning, Capable of insight, Able to change behavior, and Progressing to goal      Endings: None Reported    Modes of Intervention: Education, Support, and Problem-solving      Discipline Responsible: Behavorial Health Tech      Signature:  Chetna Sotomayor

## 2024-04-13 NOTE — BH NOTE
Group Therapy Note    Date: 4/13/2024  Start Time: 2000  End Time:  2020  Number of Participants: 1    Type of Group: Wrap-Up    Wellness Binder Information  Module Name:    Session Number:      Patient's Goal:  stay positive    Notes:  working on it    Status After Intervention:  Improved    Participation Level: Active Listener    Participation Quality: Appropriate      Speech:  normal      Thought Process/Content: Logical      Affective Functioning: Congruent      Mood: anxious      Level of consciousness:  Alert      Response to Learning: Able to verbalize current knowledge/experience      Endings: None Reported    Modes of Intervention: Education      Discipline Responsible: Registered Nurse      Signature:  Samantha Lloyd RN

## 2024-04-14 LAB
ANION GAP SERPL CALC-SCNC: 13 MEQ/L (ref 8–16)
BUN SERPL-MCNC: 65 MG/DL (ref 7–22)
CALCIUM SERPL-MCNC: 7.9 MG/DL (ref 8.5–10.5)
CHLORIDE SERPL-SCNC: 102 MEQ/L (ref 98–111)
CO2 SERPL-SCNC: 24 MEQ/L (ref 23–33)
CREAT SERPL-MCNC: 8.5 MG/DL (ref 0.4–1.2)
GFR SERPL CREATININE-BSD FRML MDRD: 7 ML/MIN/1.73M2
GLUCOSE SERPL-MCNC: 112 MG/DL (ref 70–108)
POTASSIUM SERPL-SCNC: 5.8 MEQ/L (ref 3.5–5.2)
SODIUM SERPL-SCNC: 139 MEQ/L (ref 135–145)

## 2024-04-14 PROCEDURE — 80048 BASIC METABOLIC PNL TOTAL CA: CPT

## 2024-04-14 PROCEDURE — 6370000000 HC RX 637 (ALT 250 FOR IP): Performed by: PSYCHIATRY & NEUROLOGY

## 2024-04-14 PROCEDURE — 1240000000 HC EMOTIONAL WELLNESS R&B

## 2024-04-14 PROCEDURE — 6370000000 HC RX 637 (ALT 250 FOR IP)

## 2024-04-14 PROCEDURE — 99232 SBSQ HOSP IP/OBS MODERATE 35: CPT | Performed by: INTERNAL MEDICINE

## 2024-04-14 PROCEDURE — 36415 COLL VENOUS BLD VENIPUNCTURE: CPT

## 2024-04-14 PROCEDURE — 6370000000 HC RX 637 (ALT 250 FOR IP): Performed by: INTERNAL MEDICINE

## 2024-04-14 PROCEDURE — 6370000000 HC RX 637 (ALT 250 FOR IP): Performed by: STUDENT IN AN ORGANIZED HEALTH CARE EDUCATION/TRAINING PROGRAM

## 2024-04-14 PROCEDURE — 6370000000 HC RX 637 (ALT 250 FOR IP): Performed by: PHYSICIAN ASSISTANT

## 2024-04-14 RX ORDER — CETIRIZINE HYDROCHLORIDE 10 MG/1
10 TABLET ORAL DAILY
Status: DISCONTINUED | OUTPATIENT
Start: 2024-04-14 | End: 2024-04-16 | Stop reason: HOSPADM

## 2024-04-14 RX ADMIN — CLONIDINE HYDROCHLORIDE 0.3 MG: 0.2 TABLET ORAL at 14:57

## 2024-04-14 RX ADMIN — ISOSORBIDE MONONITRATE 120 MG: 60 TABLET, EXTENDED RELEASE ORAL at 22:38

## 2024-04-14 RX ADMIN — HYDRALAZINE HYDROCHLORIDE 100 MG: 50 TABLET ORAL at 16:14

## 2024-04-14 RX ADMIN — Medication 1000 UNITS: at 08:50

## 2024-04-14 RX ADMIN — HYDRALAZINE HYDROCHLORIDE 100 MG: 50 TABLET ORAL at 22:38

## 2024-04-14 RX ADMIN — SERTRALINE 150 MG: 100 TABLET, FILM COATED ORAL at 08:48

## 2024-04-14 RX ADMIN — FLUTICASONE PROPIONATE 1 SPRAY: 50 SPRAY, METERED NASAL at 07:45

## 2024-04-14 RX ADMIN — ATORVASTATIN CALCIUM 40 MG: 40 TABLET, FILM COATED ORAL at 22:38

## 2024-04-14 RX ADMIN — HYDRALAZINE HYDROCHLORIDE 100 MG: 50 TABLET ORAL at 07:45

## 2024-04-14 RX ADMIN — Medication 360 MG: at 16:16

## 2024-04-14 RX ADMIN — Medication 360 MG: at 07:45

## 2024-04-14 RX ADMIN — Medication 360 MG: at 12:03

## 2024-04-14 RX ADMIN — CLONIDINE HYDROCHLORIDE 0.3 MG: 0.2 TABLET ORAL at 22:39

## 2024-04-14 RX ADMIN — CLONIDINE HYDROCHLORIDE 0.3 MG: 0.2 TABLET ORAL at 08:48

## 2024-04-14 RX ADMIN — SODIUM ZIRCONIUM CYCLOSILICATE 10 G: 10 POWDER, FOR SUSPENSION ORAL at 22:38

## 2024-04-14 RX ADMIN — CETIRIZINE HYDROCHLORIDE 10 MG: 10 TABLET, FILM COATED ORAL at 22:38

## 2024-04-14 RX ADMIN — DOXAZOSIN 4 MG: 4 TABLET ORAL at 22:38

## 2024-04-14 RX ADMIN — TRAZODONE HYDROCHLORIDE 100 MG: 100 TABLET ORAL at 22:39

## 2024-04-14 RX ADMIN — ASPIRIN 81 MG: 81 TABLET, COATED ORAL at 08:48

## 2024-04-14 RX ADMIN — ISOSORBIDE MONONITRATE 120 MG: 60 TABLET, EXTENDED RELEASE ORAL at 08:48

## 2024-04-14 RX ADMIN — FOLIC ACID 1 MG: 1 TABLET ORAL at 08:48

## 2024-04-14 RX ADMIN — DOXAZOSIN 4 MG: 4 TABLET ORAL at 08:48

## 2024-04-14 RX ADMIN — AMLODIPINE BESYLATE 10 MG: 10 TABLET ORAL at 08:48

## 2024-04-14 RX ADMIN — FAMOTIDINE 20 MG: 20 TABLET, FILM COATED ORAL at 08:48

## 2024-04-14 RX ADMIN — Medication 400 UNITS: at 08:48

## 2024-04-14 ASSESSMENT — PAIN SCALES - GENERAL
PAINLEVEL_OUTOF10: 0
PAINLEVEL_OUTOF10: 0

## 2024-04-14 NOTE — GROUP NOTE
Group Therapy Note    Date: 4/14/2024    Group Start Time: 1500  Group End Time: 1530  Group Topic: Healthy Living/Wellness    [facility]    Chetna Sotomayor        Group Therapy Note    Attendees: Patients participated in an Aroma Therapy group.       Patient's Goal:  To laugh    Notes:  Patient was fully engaged in today's activity.    Status After Intervention:  Improved    Participation Level: Active Listener and Interactive    Participation Quality: Appropriate and Attentive      Speech:  normal      Thought Process/Content: Linear      Affective Functioning: Congruent      Mood: euthymic      Level of consciousness:  Alert and Attentive      Response to Learning: Progressing to goal      Endings: None Reported    Modes of Intervention: Activity      Discipline Responsible: Behavorial Health Tech      Signature:  Chetna Sotomayor

## 2024-04-14 NOTE — BH NOTE
Group Therapy Note    Date: 4/13/2024  Start Time: 2000  End Time:  2030  Number of Participants: 1    Type of Group: Wrap-Up    Wellness Binder Information  Module Name:    Session Number:      Patient's Goal:  stay positive    Notes:  working on goal    Status After Intervention:  Improved    Participation Level: Active Listener    Participation Quality: Appropriate      Speech:  normal      Thought Process/Content: Logical      Affective Functioning: Congruent      Mood: anxious      Level of consciousness:  Alert      Response to Learning: Able to verbalize current knowledge/experience      Endings: None Reported    Modes of Intervention: Education      Discipline Responsible: Registered Nurse      Signature:  Samantha Lloyd RN

## 2024-04-14 NOTE — GROUP NOTE
Group Therapy Note    Date: 4/14/2024    Group Start Time: 0900  Group End Time: 0930  Group Topic: Community Meeting    [facility]    Chetna Sotomayor        Group Therapy Note    Attendees: Patients Shared their goals for today.       Patient's Goal:  To laugh.    Notes:  Patient was slightly monopolizing but supportive of his peers.    Status After Intervention:  Improved    Participation Level: Active Listener, Interactive, and Monopolizing    Participation Quality: Attentive, Sharing, and Supportive      Speech:  normal      Thought Process/Content: Linear      Affective Functioning: Congruent      Mood: euthymic      Level of consciousness:  Alert and Attentive      Response to Learning: Able to verbalize current knowledge/experience, Able to verbalize/acknowledge new learning, and Progressing to goal      Endings: None Reported    Modes of Intervention: Education, Support, and Problem-solving      Discipline Responsible: Behavorial Health Tech      Signature:  Chetna Sotomayor

## 2024-04-15 LAB
ANION GAP SERPL CALC-SCNC: 23 MEQ/L (ref 8–16)
BUN SERPL-MCNC: 90 MG/DL (ref 7–22)
CALCIUM SERPL-MCNC: 8.1 MG/DL (ref 8.5–10.5)
CHLORIDE SERPL-SCNC: 103 MEQ/L (ref 98–111)
CO2 SERPL-SCNC: 17 MEQ/L (ref 23–33)
CREAT SERPL-MCNC: 10.9 MG/DL (ref 0.4–1.2)
GFR SERPL CREATININE-BSD FRML MDRD: 5 ML/MIN/1.73M2
GLUCOSE SERPL-MCNC: 92 MG/DL (ref 70–108)
POTASSIUM SERPL-SCNC: 4 MEQ/L (ref 3.5–5.2)
POTASSIUM SERPL-SCNC: 6 MEQ/L (ref 3.5–5.2)
SODIUM SERPL-SCNC: 143 MEQ/L (ref 135–145)

## 2024-04-15 PROCEDURE — 36415 COLL VENOUS BLD VENIPUNCTURE: CPT

## 2024-04-15 PROCEDURE — 6370000000 HC RX 637 (ALT 250 FOR IP): Performed by: PSYCHIATRY & NEUROLOGY

## 2024-04-15 PROCEDURE — 90935 HEMODIALYSIS ONE EVALUATION: CPT

## 2024-04-15 PROCEDURE — 84132 ASSAY OF SERUM POTASSIUM: CPT

## 2024-04-15 PROCEDURE — 6370000000 HC RX 637 (ALT 250 FOR IP): Performed by: STUDENT IN AN ORGANIZED HEALTH CARE EDUCATION/TRAINING PROGRAM

## 2024-04-15 PROCEDURE — 1240000000 HC EMOTIONAL WELLNESS R&B

## 2024-04-15 PROCEDURE — 6370000000 HC RX 637 (ALT 250 FOR IP): Performed by: INTERNAL MEDICINE

## 2024-04-15 PROCEDURE — 80048 BASIC METABOLIC PNL TOTAL CA: CPT

## 2024-04-15 PROCEDURE — 6370000000 HC RX 637 (ALT 250 FOR IP)

## 2024-04-15 PROCEDURE — 6370000000 HC RX 637 (ALT 250 FOR IP): Performed by: PHYSICIAN ASSISTANT

## 2024-04-15 RX ORDER — ATORVASTATIN CALCIUM 80 MG/1
40 TABLET, FILM COATED ORAL DAILY
Qty: 15 TABLET | Refills: 0 | Status: SHIPPED | OUTPATIENT
Start: 2024-04-15 | End: 2024-05-15

## 2024-04-15 RX ORDER — HYDRALAZINE HYDROCHLORIDE 100 MG/1
100 TABLET, FILM COATED ORAL EVERY 8 HOURS
Qty: 90 TABLET | Refills: 0 | Status: SHIPPED | OUTPATIENT
Start: 2024-04-15 | End: 2024-05-15

## 2024-04-15 RX ORDER — AMLODIPINE BESYLATE 10 MG/1
10 TABLET ORAL DAILY
Qty: 30 TABLET | Refills: 0 | Status: SHIPPED | OUTPATIENT
Start: 2024-04-16 | End: 2024-05-16

## 2024-04-15 RX ORDER — CALCITRIOL 0.5 UG/1
0.5 CAPSULE, LIQUID FILLED ORAL
Qty: 12 CAPSULE | Refills: 0 | Status: SHIPPED | OUTPATIENT
Start: 2024-04-15 | End: 2024-05-15

## 2024-04-15 RX ORDER — ISOSORBIDE MONONITRATE 120 MG/1
120 TABLET, EXTENDED RELEASE ORAL 2 TIMES DAILY
Qty: 60 TABLET | Refills: 0 | Status: SHIPPED | OUTPATIENT
Start: 2024-04-15 | End: 2024-05-15

## 2024-04-15 RX ORDER — CLONIDINE HYDROCHLORIDE 0.3 MG/1
0.3 TABLET ORAL 3 TIMES DAILY
Qty: 90 TABLET | Refills: 0 | Status: SHIPPED | OUTPATIENT
Start: 2024-04-15 | End: 2024-05-15

## 2024-04-15 RX ORDER — CINACALCET 90 MG/1
90 TABLET, FILM COATED ORAL
Qty: 12 TABLET | Refills: 0 | Status: SHIPPED | OUTPATIENT
Start: 2024-04-17 | End: 2024-05-17

## 2024-04-15 RX ORDER — DOXAZOSIN MESYLATE 4 MG/1
4 TABLET ORAL EVERY 12 HOURS SCHEDULED
Qty: 60 TABLET | Refills: 0 | Status: SHIPPED | OUTPATIENT
Start: 2024-04-15 | End: 2024-05-15

## 2024-04-15 RX ORDER — ASPIRIN 81 MG/1
81 TABLET ORAL DAILY
Qty: 30 TABLET | Refills: 0 | Status: SHIPPED | OUTPATIENT
Start: 2024-04-16 | End: 2024-05-16

## 2024-04-15 RX ADMIN — DOXAZOSIN 4 MG: 4 TABLET ORAL at 13:09

## 2024-04-15 RX ADMIN — HYDRALAZINE HYDROCHLORIDE 100 MG: 50 TABLET ORAL at 22:49

## 2024-04-15 RX ADMIN — ISOSORBIDE MONONITRATE 120 MG: 60 TABLET, EXTENDED RELEASE ORAL at 14:28

## 2024-04-15 RX ADMIN — FAMOTIDINE 20 MG: 20 TABLET, FILM COATED ORAL at 13:09

## 2024-04-15 RX ADMIN — ATORVASTATIN CALCIUM 40 MG: 40 TABLET, FILM COATED ORAL at 22:49

## 2024-04-15 RX ADMIN — FOLIC ACID 1 MG: 1 TABLET ORAL at 13:10

## 2024-04-15 RX ADMIN — TRAZODONE HYDROCHLORIDE 100 MG: 100 TABLET ORAL at 22:49

## 2024-04-15 RX ADMIN — ASPIRIN 81 MG: 81 TABLET, COATED ORAL at 13:09

## 2024-04-15 RX ADMIN — Medication 1000 UNITS: at 14:28

## 2024-04-15 RX ADMIN — FLUTICASONE PROPIONATE 1 SPRAY: 50 SPRAY, METERED NASAL at 06:15

## 2024-04-15 RX ADMIN — DOXAZOSIN 4 MG: 4 TABLET ORAL at 22:49

## 2024-04-15 RX ADMIN — CETIRIZINE HYDROCHLORIDE 10 MG: 10 TABLET, FILM COATED ORAL at 13:09

## 2024-04-15 RX ADMIN — SODIUM ZIRCONIUM CYCLOSILICATE 10 G: 10 POWDER, FOR SUSPENSION ORAL at 22:50

## 2024-04-15 RX ADMIN — SERTRALINE 150 MG: 100 TABLET, FILM COATED ORAL at 14:28

## 2024-04-15 RX ADMIN — HYDRALAZINE HYDROCHLORIDE 100 MG: 50 TABLET ORAL at 13:10

## 2024-04-15 RX ADMIN — AMLODIPINE BESYLATE 10 MG: 10 TABLET ORAL at 13:09

## 2024-04-15 RX ADMIN — Medication 400 UNITS: at 14:28

## 2024-04-15 RX ADMIN — ISOSORBIDE MONONITRATE 120 MG: 60 TABLET, EXTENDED RELEASE ORAL at 22:49

## 2024-04-15 RX ADMIN — POLYETHYLENE GLYCOL 3350 17 G: 17 POWDER, FOR SOLUTION ORAL at 23:34

## 2024-04-15 RX ADMIN — CLONIDINE HYDROCHLORIDE 0.3 MG: 0.2 TABLET ORAL at 13:10

## 2024-04-15 RX ADMIN — SODIUM ZIRCONIUM CYCLOSILICATE 10 G: 10 POWDER, FOR SUSPENSION ORAL at 13:09

## 2024-04-15 RX ADMIN — CALCITRIOL CAPSULES 0.25 MCG 0.5 MCG: 0.25 CAPSULE ORAL at 13:09

## 2024-04-15 RX ADMIN — CLONIDINE HYDROCHLORIDE 0.3 MG: 0.2 TABLET ORAL at 22:49

## 2024-04-15 RX ADMIN — CINACALCET HYDROCHLORIDE 90 MG: 30 TABLET, FILM COATED ORAL at 13:09

## 2024-04-15 ASSESSMENT — PAIN SCALES - GENERAL
PAINLEVEL_OUTOF10: 0
PAINLEVEL_OUTOF10: 0

## 2024-04-15 ASSESSMENT — PAIN SCALES - WONG BAKER
WONGBAKER_NUMERICALRESPONSE: NO HURT
WONGBAKER_NUMERICALRESPONSE: NO HURT

## 2024-04-15 ASSESSMENT — PAIN - FUNCTIONAL ASSESSMENT: PAIN_FUNCTIONAL_ASSESSMENT: ACTIVITIES ARE NOT PREVENTED

## 2024-04-15 NOTE — GROUP NOTE
Group Therapy Note    Date: 4/15/2024    Group Start Time: 1000  Group End Time: 1045  Group Topic: Healthy Living/Wellness    [facility]    Yumiko Rangel LPN        Group Therapy Note    Attendees: 6       Notes:  did not attend, was in dialysis    Signature:  Yumiko Rangel LPN

## 2024-04-15 NOTE — BH NOTE
Group Therapy Note    Date: 4/14/2024  Start Time: 2000  End Time:  2020  Number of Participants: 1    Type of Group: Wrap-Up    Wellness Binder Information  Module Name:    Session Number:      Patient's Goal:  laugh today    Notes:  met    Status After Intervention:  Improved    Participation Level: Active Listener    Participation Quality: Appropriate      Speech:  normal      Thought Process/Content: Logical      Affective Functioning: Congruent      Mood: anxious      Level of consciousness:  Alert      Response to Learning: Able to verbalize current knowledge/experience      Endings: None Reported    Modes of Intervention: Education      Discipline Responsible: Registered Nurse      Signature:  Samantha Lloyd RN

## 2024-04-15 NOTE — FLOWSHEET NOTE
04/15/24 1138   Vital Signs   BP (!) 191/88   Temp 97.5 °F (36.4 °C)   Pulse 75   Respirations 18   Weight - Scale 96.2 kg (212 lb 1.3 oz)   Weight Method Bed scale   Percent Weight Change -5.22   Post-Hemodialysis Assessment   Post-Treatment Procedures Blood returned;Access bleeding time < 10 minutes   Machine Disinfection Process Acid/Vinegar Clean;Heat Disinfect;Exterior Machine Disinfection   Rinseback Volume (ml) 400 ml   Blood Volume Processed (Liters) 114.1 L   Dialyzer Clearance Lightly streaked   Duration of Treatment (minutes) 210 minutes   Heparin Amount Administered During Treatment (mL) 0 mL   Hemodialysis Intake (ml) 400 ml   Hemodialysis Output (ml) 5650 ml   NET Removed (ml) 5250     stable 3.5 hour treatment. 5.25 liters net uf. pressure held both cannulation sites. pressure held both cannulation sites x 10  minutes. dressing dry and intact upon discharge from unit. report given to primary nurse.

## 2024-04-16 VITALS
OXYGEN SATURATION: 97 % | HEART RATE: 83 BPM | WEIGHT: 208.5 LBS | BODY MASS INDEX: 25.92 KG/M2 | DIASTOLIC BLOOD PRESSURE: 68 MMHG | HEIGHT: 75 IN | SYSTOLIC BLOOD PRESSURE: 153 MMHG | RESPIRATION RATE: 18 BRPM | TEMPERATURE: 97.9 F

## 2024-04-16 LAB
ANION GAP SERPL CALC-SCNC: 14 MEQ/L (ref 8–16)
BUN SERPL-MCNC: 61 MG/DL (ref 7–22)
CALCIUM SERPL-MCNC: 7.6 MG/DL (ref 8.5–10.5)
CHLORIDE SERPL-SCNC: 97 MEQ/L (ref 98–111)
CO2 SERPL-SCNC: 27 MEQ/L (ref 23–33)
CREAT SERPL-MCNC: 8 MG/DL (ref 0.4–1.2)
DEPRECATED RDW RBC AUTO: 53.4 FL (ref 35–45)
ERYTHROCYTE [DISTWIDTH] IN BLOOD BY AUTOMATED COUNT: 13.6 % (ref 11.5–14.5)
GFR SERPL CREATININE-BSD FRML MDRD: 7 ML/MIN/1.73M2
GLUCOSE SERPL-MCNC: 88 MG/DL (ref 70–108)
HCT VFR BLD AUTO: 30.8 % (ref 42–52)
HGB BLD-MCNC: 9.5 GM/DL (ref 14–18)
MCH RBC QN AUTO: 33 PG (ref 26–33)
MCHC RBC AUTO-ENTMCNC: 30.8 GM/DL (ref 32.2–35.5)
MCV RBC AUTO: 106.9 FL (ref 80–94)
PLATELET # BLD AUTO: 124 THOU/MM3 (ref 130–400)
PMV BLD AUTO: 10.1 FL (ref 9.4–12.4)
POTASSIUM SERPL-SCNC: 4.9 MEQ/L (ref 3.5–5.2)
RBC # BLD AUTO: 2.88 MILL/MM3 (ref 4.7–6.1)
SODIUM SERPL-SCNC: 138 MEQ/L (ref 135–145)
WBC # BLD AUTO: 4.2 THOU/MM3 (ref 4.8–10.8)

## 2024-04-16 PROCEDURE — 85027 COMPLETE CBC AUTOMATED: CPT

## 2024-04-16 PROCEDURE — 80048 BASIC METABOLIC PNL TOTAL CA: CPT

## 2024-04-16 PROCEDURE — 6370000000 HC RX 637 (ALT 250 FOR IP): Performed by: PSYCHIATRY & NEUROLOGY

## 2024-04-16 PROCEDURE — 6370000000 HC RX 637 (ALT 250 FOR IP)

## 2024-04-16 PROCEDURE — 36415 COLL VENOUS BLD VENIPUNCTURE: CPT

## 2024-04-16 PROCEDURE — 6370000000 HC RX 637 (ALT 250 FOR IP): Performed by: PHYSICIAN ASSISTANT

## 2024-04-16 PROCEDURE — 6370000000 HC RX 637 (ALT 250 FOR IP): Performed by: INTERNAL MEDICINE

## 2024-04-16 PROCEDURE — 5130000000 HC BRIDGE APPOINTMENT

## 2024-04-16 PROCEDURE — 6370000000 HC RX 637 (ALT 250 FOR IP): Performed by: STUDENT IN AN ORGANIZED HEALTH CARE EDUCATION/TRAINING PROGRAM

## 2024-04-16 RX ORDER — TRAZODONE HYDROCHLORIDE 100 MG/1
100 TABLET ORAL NIGHTLY PRN
Qty: 30 TABLET | Refills: 0 | Status: SHIPPED | OUTPATIENT
Start: 2024-04-16

## 2024-04-16 RX ADMIN — SERTRALINE 150 MG: 100 TABLET, FILM COATED ORAL at 08:19

## 2024-04-16 RX ADMIN — CETIRIZINE HYDROCHLORIDE 10 MG: 10 TABLET, FILM COATED ORAL at 08:20

## 2024-04-16 RX ADMIN — SODIUM ZIRCONIUM CYCLOSILICATE 10 G: 10 POWDER, FOR SUSPENSION ORAL at 08:20

## 2024-04-16 RX ADMIN — FAMOTIDINE 20 MG: 20 TABLET, FILM COATED ORAL at 08:20

## 2024-04-16 RX ADMIN — HYDRALAZINE HYDROCHLORIDE 100 MG: 50 TABLET ORAL at 08:19

## 2024-04-16 RX ADMIN — DOXAZOSIN 4 MG: 4 TABLET ORAL at 08:19

## 2024-04-16 RX ADMIN — AMLODIPINE BESYLATE 10 MG: 10 TABLET ORAL at 08:20

## 2024-04-16 RX ADMIN — CLONIDINE HYDROCHLORIDE 0.3 MG: 0.2 TABLET ORAL at 08:19

## 2024-04-16 RX ADMIN — ISOSORBIDE MONONITRATE 120 MG: 60 TABLET, EXTENDED RELEASE ORAL at 08:20

## 2024-04-16 RX ADMIN — FOLIC ACID 1 MG: 1 TABLET ORAL at 08:20

## 2024-04-16 RX ADMIN — Medication 1000 UNITS: at 08:19

## 2024-04-16 RX ADMIN — FLUTICASONE PROPIONATE 1 SPRAY: 50 SPRAY, METERED NASAL at 08:29

## 2024-04-16 RX ADMIN — ASPIRIN 81 MG: 81 TABLET, COATED ORAL at 08:19

## 2024-04-16 RX ADMIN — Medication 400 UNITS: at 08:19

## 2024-04-16 ASSESSMENT — PAIN SCALES - GENERAL: PAINLEVEL_OUTOF10: 0

## 2024-04-16 NOTE — TRANSITION OF CARE
Behavioral Health Transition Record to Provider    Patient Name: Trav Jennings  YOB: 1965   Medical Record Number: [id number]  Date of Admission: 4/8/2024  7:58 PM   Date of Discharge: 4-16-24    Attending Provider: Ian Holm MD   Discharging Provider: Dr. Holm  To contact this individual call [phone] option 2 and ask the  to page.  If unavailable, ask to be transferred to Behavioral Health Provider on call.  A Behavioral Health Provider will be available on call 24/7 and during holidays.    Primary Care Provider: Radha Hou APRN - CNP    Allergies   Allergen Reactions    Humalog [Insulin Lispro] Other (See Comments)     Extreme sweating and intolerance. Patient absolutely refuses due to reaction.     Insulin Regular Human Hives    Lisinopril Swelling     Swelling in lips        Reason for Admission: substance induced mood disorder    Admission Diagnosis: Substance induced mood disorder (HCC) [F19.94]    * No surgery found *    Results for orders placed or performed during the hospital encounter of 04/08/24   Basic Metabolic Panel   Result Value Ref Range    Sodium 139 135 - 145 meq/L    Potassium 5.7 (H) 3.5 - 5.2 meq/L    Chloride 94 (L) 98 - 111 meq/L    CO2 32 23 - 33 meq/L    Glucose 87 70 - 108 mg/dL    BUN 46 (H) 7 - 22 mg/dL    Creatinine 6.7 (HH) 0.4 - 1.2 mg/dL    Calcium 7.1 (L) 8.5 - 10.5 mg/dL   CBC with Auto Differential   Result Value Ref Range    WBC 4.1 (L) 4.8 - 10.8 thou/mm3    RBC 2.85 (L) 4.70 - 6.10 mill/mm3    Hemoglobin 9.5 (L) 14.0 - 18.0 gm/dl    Hematocrit 30.3 (L) 42.0 - 52.0 %    .3 (H) 80.0 - 94.0 fL    MCH 33.3 (H) 26.0 - 33.0 pg    MCHC 31.4 (L) 32.2 - 35.5 gm/dl    RDW-CV 13.5 11.5 - 14.5 %    RDW-SD 52.4 (H) 35.0 - 45.0 fL    Platelets 100 (L) 130 - 400 thou/mm3    MPV 10.2 9.4 - 12.4 fL    Seg Neutrophils 71.5 %    Lymphocytes 16.0 %    Monocytes 10.1 %    Eosinophils 2.0 %    Basophils 0.2 %    Immature Granulocytes % 0.2 %    Segs

## 2024-04-16 NOTE — GROUP NOTE
Group Therapy Note    Date: 4/16/2024    Group Start Time: 1100  Group End Time: 1200  Group Topic: Healthy Living/Wellness    [facility]    Christina Bueno, RN        Group Therapy Note    Attendees: 8     Patient attended group facilitated by nursing students.     Discipline Responsible: Registered Nurse      Signature:  Christina Bueno RN

## 2024-04-16 NOTE — DISCHARGE SUMMARY
Physician Discharge Summary     Patient ID:  Trav Jennings  [id number]  59 y.o.  1965    Admit date: 4/8/2024    Discharge date and time: 4/16/2024  8:21 AM     Admitting Physician: Ian Holm MD     Discharge Physician: Ian Holm MD      Admission Diagnoses: Substance induced mood disorder (HCC) [F19.94]    IDENTIFYING INFORMATION: ***    HISTORY OF PRESENT ILLNESS: ***    MENTAL STATUS EXAMINATION AT ADMISSION: See H and P.    Discharge Diagnoses:   Substance induced mood disorder (HCC)     Past Medical History:   Diagnosis Date    AAA (abdominal aortic aneurysm) ([facility])     NURIS (acute kidney injury) ([facility]) 9/24/2015    Anemia associated with chronic renal failure     Arthritis     stated in hands    Cocaine abuse ([facility]) 5/10/2014    Depression     Diabetes mellitus ([facility])     pt states he no longer has diabetes he has lost alot of davion.     FSGS (focal segmental glomerulosclerosis) 5/23/2013    Hemodialysis patient ([facility]) 10/17/2016    on hemodialysis with [facility]    Hemorrhoids 1/16/2012    History of blood transfusion     Hyperlipidemia     Hyperphosphatemia 5/21/2016    Hypertension     Left renal artery stenosis ([facility]) 5/22/2014    Monoclonal (M) protein disease, multiple 'M' protein     Nicotine dependence 6/16/2014    Noncompliance     Pneumonia     Psychiatric problem     PTSD (post-traumatic stress disorder)     Pt vet from [facility]    Secondary hyperparathyroidism (of renal origin)     Sleep apnea         Admission Condition: poor    Discharged Condition: stable    Indication for Admission: threat to self    Significant Diagnostic Studies:   See Results Review tab in EHR    CBC:   Recent Labs     04/16/24  0716   WBC 4.2*   HGB 9.5*   *     BMP:    Recent Labs     04/14/24  0738 04/15/24  0658 04/15/24  1446 04/16/24  0716    143  --  138   K 5.8* 6.0* 4.0 4.9    103  --  97*   CO2 24 17*  --  27   BUN 65* 90*  --  61*   CREATININE 8.5* 10.9*  --

## 2024-04-16 NOTE — GROUP NOTE
Group Therapy Note    Date: 4/16/2024    Group Start Time: 0900  Group End Time: 1030  Group Topic: Community Meeting    [facility]    Christina Bueno RN        Group Therapy Note    Attendees: 9     Patient attended group facilitated by nursing students.     Discipline Responsible: Registered Nurse      Signature:  Christina Bueno RN

## 2024-04-16 NOTE — PROGRESS NOTES
Group Therapy Note    Date: 4/12/2024  Start Time: 1330   End Time:  1400  Number of Participants: 5    Type of Group: Psychotherapy      Notes:  Pt is present for group. Pt is very intrusive and monopolizing. He is difficult to redirect. Although he does not demonstrate delusional behavior, he does express opinions which fuel the delusions and symptoms of another group member.  Pt has very limited insight. The group discussion initially began by examining how family beliefs and or genetics have influenced each group members life today. The group was asked to explore control vs lack of control and what steps they could take to improve their life for today. Group however had to be ended early due to the acuity of some of the group members who were very monopolizing, intrusive with prominent delusions.     Status After Intervention:  Unchanged    Participation Level: Monopolizing    Participation Quality: Inappropriate, Intrusive, and Resistant      Speech:  loud      Thought Process/Content: Linear      Affective Functioning: Constricted/Restricted      Mood: elevated      Level of consciousness:  Alert and Oriented x4      Response to Learning: Able to verbalize current knowledge/experience      Endings: None Reported    Modes of Intervention: Education, Support, Socialization, Exploration, and Clarifying      Discipline Responsible: /Counselor      Signature:  NITISH Urbina    
                                                                    Group Therapy Note    Date: 4/09/2024  Start Time: 1330  End Time:  1410  Number of Participants: 4    Type of Group: Psychotherapy    Notes:  Patient was present in group. Group members discussed the topic of perseverance and how it plays a role in their lives. Members discuss the importance of identifying coping skills in relation to the group topic. Members were introduced to grounding technique using five senses. Patient participated in group. Demonstrated good insight.     Status After Intervention:  Improved    Participation Level: Active Listener and Interactive    Participation Quality: Appropriate, Attentive, Sharing, and Supportive      Speech:  normal      Thought Process/Content: Logical  Linear      Affective Functioning: Congruent      Mood: depressed      Level of consciousness:  Alert, Oriented x4, and Attentive      Response to Learning: Able to verbalize current knowledge/experience, Able to retain information, Capable of insight, and Able to change behavior      Endings: None Reported    Modes of Intervention: Education, Support, Socialization, Exploration, Clarifying, and Problem-solving      Discipline Responsible: /Counselor      Signature:  NITISH Nix  
                                                                    Group Therapy Note    Date: 4/10/2024  Start Time: 1400  End Time:  1430  Number of Participants: 5    Type of Group: Psychotherapy    Notes:  Patient was present in group. Group members discussed the topic of identity. Members spoke about how they define themselves versus how others would define them. Members discussed the importance of knowing who they are.    Status After Intervention:  Improved    Participation Level: Active Listener and Interactive    Participation Quality: Appropriate, Attentive, Sharing, and Supportive      Speech:  normal      Thought Process/Content: Logical  Linear      Affective Functioning: Congruent      Mood: euthymic      Level of consciousness:  Alert, Oriented x4, and Attentive      Response to Learning: Able to verbalize current knowledge/experience, Able to retain information, and Capable of insight      Endings: None Reported    Modes of Intervention: Education, Support, Socialization, Exploration, Clarifying, and Problem-solving      Discipline Responsible: /Counselor      Signature:  NITISH Nix  
                                                           Psychosocial Assessment    Current Level of Psychosocial Functioning     Independent   Dependent    Minimal Assist      XXX    Comments:      Psychosocial High Risk Factors (check all that apply)    Unable to obtain meds   Chronic illness/pain    Substance abuse      XXX   Lack of Family Support   Financial stress   Isolation   Inadequate Community Resources   XXX  Suicide attempt(s)  Not taking medications   Victim of crime   Developmental Delay  Unable to manage personal needs    Age 65 or older   Homeless        XXX  No transportation      XXX  Readmission within 30 days  Unemployment  Traumatic Event    Family/Supports identified: Wife and fourteen year old daughter    Sexual Orientation:  Heterosexual    Patient Strengths: Seeking help    Patient Barriers: Homelessness, significant history of substance use, not connected with outpatient services    Safety plan: Contracts for safety    [facility]/ history: Patient reports previous history of inpatient psychiatric treatment, with latest admission being in 03/2018 at [facility].    Plan of Care:  medication management, group/individual therapies, family meetings, psycho -education, treatment team meetings to assist with stabilization    Initial Discharge Plan:  Patient is not currently connected with outpatient services. Discussed services at [place] with patient. Patient is currently homeless.     Clinical Summary:  Patient is a 59 year old male that was admitted to the unit as a transfer from medical floor on a [facility]. Patient reports suicidal thoughts to cut himself. Patient states he was experiencing auditory and visual hallucinations. Patient has extensive history of drug and alcohol use. Patient reports current use of cocaine and alcohol. Patient has limited support system of wife and daughter. Patient states he has been homeless since October, when \"everything blew up\". Patient does not specify on details. 
                Goal Wrap-Up/Relaxation Group    Date: 4/10/2024  Start Time: 2000  End Time:  2020    Type of Group: Goal Wrap Up    States that goal today was: \"Talk to \"    Goal for today was Met    Patient Participated in group/activities appropriately      Signature: Jean Pires RN  
                Goal Wrap-Up/Relaxation Group    Date: 4/15/2024  Start Time: 2000  End Time:  2020    Type of Group: Goal Wrap Up and Relaxation    States that goal today was: \"to get somewhere situated for my discharge\"     Goal for today was Still working on it     Patient Participated in group/activities appropriately  Patient reports that he and Pat, , made progress in regards to his discharge placement today and that \"they are waiting for a response\"       Signature: Ban Coello RN  
                Goal Wrap-Up/Relaxation Group    Date: 4/9/2024  Start Time: 2000  End Time:  2020    Type of Group: Goal Wrap Up and Relaxation    States that goal today was: talk to  about possible places to live     Goal for today was working towards goal     Patient Participated in group/activities appropriately      Signature: [employer]/ BLANK Boland, RN Clinical Instructor   
  Group Therapy Note    Date: 4/14/2024  Start Time: 1330  End Time:  1445  Number of Participants: 6    Type of Group: Psychotherapy      Notes:  Pt is present for group. Peers discussed feelings of anxiety and stress. Group members explored life events which are connected with feelings of anxiety and stress. Group members were guided through a relaxation activity and then identified the benefit of proactive stress management rather than reactive stress management. Group members discussed how this can help to promote the emotional recovery process.     Status After Intervention:  Improved    Participation Level: Active Listener and Interactive    Participation Quality: Appropriate, Attentive, Sharing, and Supportive      Speech:  normal      Thought Process/Content: Logical  Linear      Affective Functioning: Congruent      Mood: euthymic      Level of consciousness:  Alert, Oriented x4, and Attentive      Response to Learning: Able to verbalize current knowledge/experience, Able to verbalize/acknowledge new learning, Able to retain information, Capable of insight, Able to change behavior, and Progressing to goal      Endings: None Reported    Modes of Intervention: Education, Support, Socialization, Exploration, Clarifying, Problem-solving, and Activity      Discipline Responsible: /Counselor      Signature:  NITISH Urbina  
1:1-[facility]. Pt becomes argumentative, not understanding why he must complete the phone interview. Stated that he does not understand why he can not just take the shuttle from [place] to [place] and show up. Pt is difficult to redirect. He is somewhat entitled acting, and does not take responsibility for his own actions/behaviors which contribute to his circumstances. He instead shifts the blame to larger systems, such as the pharmaceutical companies etc. I gave pt a handout on acceptance and suggested that he read it and think about it.   
24 hour chart review complete.  
24 hour chart review complete.  
24 hour chart review completed.  
4:10 Pt stated he would like to wash up in his bathroom and get around to be ready for dialysis this morning.  4:15 Pts 02 97% on room air. Pt voiced no concerns at present time. Informed pt O2 will need to be rechecked at 4:45.  4:45 Pts 02 98% on room air. Pt voiced no concerns at present time.   6:10 Pts 02 95% on room air. Pt voiced no concerns at present time.  
Alondra PROCTOR has reviewed and agrees with Yumiko BAKER's shift   Assessment.    Alondra Antoine RN  4/11/2024   
Behavioral Health  Day 7/Weekly Interdisciplinary Treatment Plan NOTE    Patient was in treatment team    ADMISSION TYPE:   Admission Type: Involuntary    REASON FOR ADMISSION:  Reason for Admission: Pt stated \"I want to get my head back straight I am seeing and hearing stuff I dont know who is who anymore I dont like the way my life is going\"  Estimated Length of Stay Update:  04/21/24  Estimated Discharge Date Update: Tomorrow per Dr. Holm.     Patient Strengths/Barriers  Strengths (Must Choose Two): Motivation level for treatment  Barriers: Support from family, Independent living  Addictive Behavior:Addictive Behavior  In the Past 3 Months, Have You Felt or Has Someone Told You That You Have a Problem With  : None  Medical Problems:  Past Medical History:   Diagnosis Date    AAA (abdominal aortic aneurysm) ([facility])     NURIS (acute kidney injury) ([facility]) 9/24/2015    Anemia associated with chronic renal failure     Arthritis     stated in hands    Cocaine abuse ([facility]) 5/10/2014    Depression     Diabetes mellitus ([facility])     pt states he no longer has diabetes he has lost alot of davion.     FSGS (focal segmental glomerulosclerosis) 5/23/2013    Hemodialysis patient ([facility]) 10/17/2016    on hemodialysis with [facility]    Hemorrhoids 1/16/2012    History of blood transfusion     Hyperlipidemia     Hyperphosphatemia 5/21/2016    Hypertension     Left renal artery stenosis ([facility]) 5/22/2014    Monoclonal (M) protein disease, multiple 'M' protein     Nicotine dependence 6/16/2014    Noncompliance     Pneumonia     Psychiatric problem     PTSD (post-traumatic stress disorder)     Pt vet from [facility]    Secondary hyperparathyroidism (of renal origin)     Sleep apnea        RISK:  Fall Risk   Lucius Scale Lucius Scale Score: 22    STATUS EXAM:   Mental Status and Behavioral Exam  Normal: No  Level of Assistance: Independent/Self  Facial Expression: Brightened  Affect: Appropriate  Level of 
Case Management 1313-I spoke with the [facility]. Pt was terminated from their housing about 6 months ago due to alcohol use. Albina shared that they may be willing to reconsider,. She will call me to let me know if they will or will not accept pt back.   
Case management -I attempted to call Shauna Godfrey directly. He telephone number is [phone]. I left a brief message explaining our effort to place this pt in the inpatient rehab. I requested a return telephone call.   
Case management 0595-I received a telephone call from the [facility] housing representative. She is going to reach out to the veterans homeless coordinator. She shared that I will receive a telephone call, with an update, tomorrow.   
Case management 0905-Telephoned the [facility] [phone]. I spoke with Carisa who confirmed that the inpatient Rehab did receive the referral this morning. Shauna Godfrey will be reaching out to this pt so that pt can complete a telephone intake. They are also aware of pt being homeless. The [facility] does have housing services which can help pt.   
Case management 0941-I attempted to call the VA intake department for inpatient Rehab. I did not receive an answer. Yesterday, I was told that they may not be open on weekends.  
Case management 1140-Telephoned the [facility] [phone]. I had left a message for Carisa. I provided an update concerning pt's discharge being held until tomorrow.    
Case management 1302-I received a telephone call from Qi at the [facility]. Pt has been accepted into the inpatient rehab program. First available bed will be next week. I discussed the plans for housing and that pt will be transported to the [facility]. Qi will remain in contact with pt as well as the  at the [facility].   
Case management 1316-Telephoned the [facility] [phone]. Carisa shared that she had messaged the intake person and that individual did respond. The intake staff will reach out to myself for the purpose of having pt complete the telephone interview. Carisa also provided the telephone number for the VA homeless department that pt is to call. [phone] ext [phone]. Carisa is going to contact them to request that pt be housed there while he is waiting to be admitted into the inpatient rehab.  The general call center for the VA is [phone].  
Case management 1524-Telephoned the [facility] [phone]. I left a message that pt is interested in discussing inpatient rehab at the [facility] in [place]. I requested a return telephone call.   
Case management 1640-I received a telephone call from the VA housing representative. She is going to place a referral for pt to the inpatient rehab program at the [facility].  Pt will have to call the VA at [phone], and complete a telephone interview. The  has contacted the inpatient rehab unit and placed a referral. Pt is in agreement with this plan.   
Case management 4187-Requested a return telephone call from Albina with [facility].   
Case management-Assisted pt in placing a telephone call to a representative with the VA who may be able to assist with housing. Requested a return telephone call.   
Case management-Faxed dialysis information to Angel at the [facility]. [phone]. I also spoke with Ruthie Colon in the [facility] Community [facility] office. Ruthie will call me in the morning, following the result of pt's assessment with Angel. Ruthie's telephone number is [phone] ext [phone].    
Case management-I telephoned the [facility] Homeless [facility]. I was provided the following contact information. Radha Pino, coordinator  for homeless veterans [phone] ext [phone]. I left a message explaining the current situation and efforts to assist this pt. I requested a return telephone call.   
Case management-Nursing staff shared that [facility] declined pt. They do not have any openings. They are also concerned about his past use of alcohol while at [facility].   
Case management-Requested a return telephone call from Carisa at the [facility]. I have not yet been contacted by the intake staff.    
Daily Progress Note  Ian Holm MD  4/11/2024    Reviewed patient's current plan of care and vital signs with nursing staff.  Sleep: 7 hours last night broken  Attending groups: Yes to some  No reported Suicidal thought. Good interaction with peers & staff. Mood 6-7 on a scale of 1 to 10 with 10 is feeling normal.  He is hopeful.  He needed oxygen last night but not during the day.  He is also on CPAP.   He states he has never been on a higher dose of sertraline but he is willing to try to increase it.    SUBJECTIVE:    Patient is feeling better. SUICIDAL IDEATION fleeting without plan.  Patient does not have medication side effects.    ROS: Patient has new complaints:  No  Sleeping adequately:  No  Visitors: No    Mental Status Examination:  Patient is cooperative. Speech: Normal rate and tone.  No abnormal movements, tics or mannerisms.  Mood dysthymic; affect Restricted. Suicidal ideation fleeting.  Homicidal ideations Absent.   Hallucinations Absent.  Delusions Absent. Thought Content: normal. Thought Processes: Goal-Directed. Alert and oriented X 3.  Attention and concentration Fair. MEMORY intact.  Insight and Judgement limited.      Data   height is 1.905 m (6' 3\") and weight is 94 kg (207 lb 3.7 oz). His oral temperature is 98.2 °F (36.8 °C). His blood pressure is 164/72 (abnormal) and his pulse is 85. His respiration is 17 and oxygen saturation is 99%.   Labs:            Medications  Current Facility-Administered Medications: acetaminophen (TYLENOL) tablet 650 mg, 650 mg, Oral, Q4H PRN  traZODone (DESYREL) tablet 100 mg, 100 mg, Oral, Nightly PRN  amLODIPine (NORVASC) tablet 10 mg, 10 mg, Oral, Daily  aspirin EC tablet 81 mg, 81 mg, Oral, Daily  calcitRIOL (ROCALTROL) capsule 0.5 mcg, 0.5 mcg, Oral, Once per day on Mon Wed Fri  cinacalcet (SENSIPAR) tablet 90 mg, 90 mg, Oral, Once per day on Mon Wed Fri  cloNIDine (CATAPRES) tablet 0.3 mg, 0.3 mg, Oral, TID  doxazosin (CARDURA) tablet 4 mg, 4 mg, Oral, 2 
Daily Progress Note  Ian Holm MD  4/12/2024    Reviewed patient's current plan of care and vital signs with nursing staff.  Sleep: 6.5 hours last night broken  Attending groups: Yes  No reported Suicidal thought. Good interaction with peers & staff. Mood 7-8 on a scale of 1 to 10 with 10 is feeling normal.  He is hopeful.     Pat is looking at different options for discharge like [facility] or inpatient addiction services through the [facility]    SUBJECTIVE:    Patient is feeling better. SUICIDAL IDEATION fleeting without plan.  Patient does not have medication side effects.    ROS: Patient has new complaints:  No  Sleeping adequately:  No  Visitors: No    Mental Status Examination:  Patient is cooperative. Speech: Normal rate and tone.  No abnormal movements, tics or mannerisms.  Mood dysthymic; affect Restricted. Suicidal ideation fleeting.  Homicidal ideations Absent.   Hallucinations Absent.  Delusions Absent. Thought Content: normal. Thought Processes: Goal-Directed. Alert and oriented X 3.  Attention and concentration Fair. MEMORY intact.  Insight and Judgement limited.      Data   height is 1.905 m (6' 3\") and weight is 99.7 kg (219 lb 12.8 oz). His temperature is 97.7 °F (36.5 °C). His blood pressure is 195/90 (abnormal) and his pulse is 87. His respiration is 18 and oxygen saturation is 96%.   Labs:            Medications  Current Facility-Administered Medications: acetaminophen (TYLENOL) tablet 650 mg, 650 mg, Oral, Q4H PRN  sertraline (ZOLOFT) tablet 150 mg, 150 mg, Oral, Daily  traZODone (DESYREL) tablet 100 mg, 100 mg, Oral, Nightly PRN  amLODIPine (NORVASC) tablet 10 mg, 10 mg, Oral, Daily  aspirin EC tablet 81 mg, 81 mg, Oral, Daily  calcitRIOL (ROCALTROL) capsule 0.5 mcg, 0.5 mcg, Oral, Once per day on Mon Wed Fri  cinacalcet (SENSIPAR) tablet 90 mg, 90 mg, Oral, Once per day on Mon Wed Fri  cloNIDine (CATAPRES) tablet 0.3 mg, 0.3 mg, Oral, TID  doxazosin (CARDURA) tablet 4 mg, 4 
Daily Progress Note  Ian Holm MD  4/13/2024    Reviewed patient's current plan of care and vital signs with nursing staff.  Sleep: 6.5 hours last night   Attending groups: Yes but was disruptive per report  No reported Suicidal thought. Good interaction with peers & staff. Mood 7-8 on a scale of 1 to 10 with 10 is feeling normal.  He is hopeful.    He was not accepted to [facility].  He has no place to go.  According to Lainey,  [facility] [place] but has to go to rehab first.  I told him to keep making phone calls since I plan to discharge him on April 15 regardless.    SUBJECTIVE:    Patient is feeling better. SUICIDAL IDEATION None.  Patient does not have medication side effects.    ROS: Patient has new complaints:  No  Sleeping adequately:  No  Visitors: No    Mental Status Examination:  Patient is cooperative. Speech: Normal rate and tone.  No abnormal movements, tics or mannerisms.  Mood euthymic; affect appropriate. Suicidal ideation fleeting.  Homicidal ideations Absent.   Hallucinations Absent.  Delusions Absent. Thought Content: normal. Thought Processes: Goal-Directed. Alert and oriented X 3.  Attention and concentration Fair. MEMORY intact.  Insight and Judgement limited.      Data   height is 1.905 m (6' 3\") and weight is 94.9 kg (209 lb 3.5 oz). His tympanic temperature is 97.6 °F (36.4 °C). His blood pressure is 167/66 (abnormal) and his pulse is 77. His respiration is 18 and oxygen saturation is 100%.   Labs:            Medications  Current Facility-Administered Medications: acetaminophen (TYLENOL) tablet 650 mg, 650 mg, Oral, Q4H PRN  sertraline (ZOLOFT) tablet 150 mg, 150 mg, Oral, Daily  traZODone (DESYREL) tablet 100 mg, 100 mg, Oral, Nightly PRN  amLODIPine (NORVASC) tablet 10 mg, 10 mg, Oral, Daily  aspirin EC tablet 81 mg, 81 mg, Oral, Daily  calcitRIOL (ROCALTROL) capsule 0.5 mcg, 0.5 mcg, Oral, Once per day on Mon Wed Fri  cinacalcet (SENSIPAR) tablet 90 
Daily Progress Note  Ian Holm MD  4/14/2024    Reviewed patient's current plan of care and vital signs with nursing staff.  Sleep: 8 hours last night   Attending groups: Yes  No reported Suicidal thought. Good interaction with peers & staff. Mood 9 on a scale of 1 to 10 with 10 is feeling normal. [facility] to [facility] [place] most likely tomorrow. He has to  the [facility] shuttle in [place] in order to go to the [facility] in [place].  He is waiting to talk to the [facility] tomorrow morning.    SUBJECTIVE:    Patient is feeling better. SUICIDAL IDEATION None.  Patient does not have medication side effects.    ROS: Patient has new complaints:  No  Sleeping adequately:  No  Visitors: No    Mental Status Examination:  Patient is cooperative. Speech: Normal rate and tone.  No abnormal movements, tics or mannerisms.  Mood euthymic; affect appropriate. Suicidal ideation fleeting.  Homicidal ideations Absent.   Hallucinations Absent.  Delusions Absent. Thought Content: normal. Thought Processes: Goal-Directed. Alert and oriented X 3.  Attention and concentration Fair. MEMORY intact.  Insight and Judgement limited.      Data   height is 1.905 m (6' 3\") and weight is 94.9 kg (209 lb 3.5 oz). His tympanic temperature is 97.6 °F (36.4 °C). His blood pressure is 178/85 (abnormal) and his pulse is 88. His respiration is 18 and oxygen saturation is 98%.   Labs:            Medications  Current Facility-Administered Medications: acetaminophen (TYLENOL) tablet 650 mg, 650 mg, Oral, Q4H PRN  sertraline (ZOLOFT) tablet 150 mg, 150 mg, Oral, Daily  traZODone (DESYREL) tablet 100 mg, 100 mg, Oral, Nightly PRN  amLODIPine (NORVASC) tablet 10 mg, 10 mg, Oral, Daily  aspirin EC tablet 81 mg, 81 mg, Oral, Daily  calcitRIOL (ROCALTROL) capsule 0.5 mcg, 0.5 mcg, Oral, Once per day on Mon Wed Fri  cinacalcet (SENSIPAR) tablet 90 mg, 90 mg, Oral, Once per day on Mon Wed Fri  cloNIDine (CATAPRES) tablet 0.3 mg, 0.3 mg, Oral, TID  doxazosin (CARDURA) tablet 
Daily Progress Note  Ian Holm MD  4/15/2024    Reviewed patient's current plan of care and vital signs with nursing staff.  Sleep: 6.5 hours last night broken  Attending groups: Yes  Patient seen at dialysis.  No reported Suicidal thought. Good interaction with peers & staff. Mood 8 last night, 5 today on a scale of 1 to 10 with 10 is feeling normal.  He is unsure about discharge since [facility].   I had a long discussion with our  Lainey.  Patient is currently on dialysis.  He will not have enough time to [facility] out [facility] [place] and to catch the shuttle van to go there.  We do not have much choice than to hold his discharge until tomorrow morning.    SUBJECTIVE:    Patient is feeling better. SUICIDAL IDEATION None.  Patient does not have medication side effects.    ROS: Patient has new complaints:  No  Sleeping adequately:  No  Visitors: No    Mental Status Examination:  Patient is cooperative. Speech: Normal rate and tone.  No abnormal movements, tics or mannerisms.  Mood euthymic; affect appropriate. Suicidal ideation absent.  Homicidal ideations Absent.   Hallucinations Absent.  Delusions Absent. Thought Content: normal. Thought Processes: Goal-Directed. Alert and oriented X 3.  Attention and concentration Fair. MEMORY intact.  Insight and Judgement limited.      Data   height is 1.905 m (6' 3\") and weight is 101.2 kg (223 lb). His oral temperature is 98.4 °F (36.9 °C). His blood pressure is 184/73 (abnormal) and his pulse is 82. His respiration is 18 and oxygen saturation is 96%.   Labs:            Medications  Current Facility-Administered Medications: sodium zirconium cyclosilicate (LOKELMA) oral suspension 10 g, 10 g, Oral, Daily  cetirizine (ZYRTEC) tablet 10 mg, 10 mg, Oral, Daily  acetaminophen (TYLENOL) tablet 650 mg, 650 mg, Oral, Q4H PRN  sertraline (ZOLOFT) tablet 150 mg, 150 mg, Oral, Daily  traZODone (DESYREL) 
Daily Progress Note  Ian Holm MD  4/16/2024    Reviewed patient's current plan of care and vital signs with nursing staff.  Sleep: 6.5 hours last night broken  Attending groups: Yes  No reported Suicidal thought. Good interaction with peers & staff. Mood 7 earlier, 8 currently on a scale of 1 to 10 with 10 is feeling normal. He is hopeful for his future.  [facility] is still [facility] the [facility].       SUBJECTIVE:    Patient is feeling better. SUICIDAL IDEATION None.  Patient does not have medication side effects.    ROS: Patient has new complaints:  No  Sleeping adequately: Yes  Visitors: No    Mental Status Examination:  Patient is cooperative. Speech: Normal rate and tone.  No abnormal movements, tics or mannerisms.  Mood euthymic; affect appropriate. Suicidal ideation absent.  Homicidal ideations Absent.   Hallucinations Absent.  Delusions Absent. Thought Content: normal. Thought Processes: Goal-Directed. Alert and oriented X 3.  Attention and concentration Fair. MEMORY intact.  Insight and Judgement limited.      Data   height is 1.905 m (6' 3\") and weight is 94.6 kg (208 lb 8 oz). His oral temperature is 97.9 °F (36.6 °C). His blood pressure is 153/68 (abnormal) and his pulse is 83. His respiration is 18 and oxygen saturation is 97%.   Labs:            Medications  Current Facility-Administered Medications: sodium zirconium cyclosilicate (LOKELMA) oral suspension 10 g, 10 g, Oral, TID  cetirizine (ZYRTEC) tablet 10 mg, 10 mg, Oral, Daily  acetaminophen (TYLENOL) tablet 650 mg, 650 mg, Oral, Q4H PRN  sertraline (ZOLOFT) tablet 150 mg, 150 mg, Oral, Daily  traZODone (DESYREL) tablet 100 mg, 100 mg, Oral, Nightly PRN  amLODIPine (NORVASC) tablet 10 mg, 10 mg, Oral, Daily  aspirin EC tablet 81 mg, 81 mg, Oral, Daily  calcitRIOL (ROCALTROL) capsule 0.5 mcg, 0.5 mcg, Oral, Once per day on Mon Wed Fri  cinacalcet (SENSIPAR) tablet 90 mg, 90 mg, Oral, Once per day on Mon Wed Fri  cloNIDine 
INPATIENT RECREATIONAL THERAPY  ADULT BEHAVIORAL SERVICES  ASSESSMENT    Patient participating in group at this time, unable to complete    REFERRING PHYSICIAN: Dr. Ian Holm  DIAGNOSIS:   substance induced mood  PRECAUTIONS:  na  HISTORY OF PRESENT ILLNESS/INJURY:   PMH:  Please see medical chart for prior medical history, allergies, and medication.    HISTORY OF PSYCHIATRIC TREATMENT: unknown  YOB: 1965  GENDER:  male  MARITAL STATUS:    EMPLOYMENT STATUS:  disabled  LIVING SITUATION:  unknown  IDENTIFIED SUPPORT SYSTEM:  unknown  EDUCATIONAL LEVEL: unknown  MEDICATION/DRUG USE: unknown      COGNITION: unknown  ATTENTION: unknown  RELAXATION: unknown  SELF-ESTEEM:  unknown  MOTIVATION:  unknown    SOCIAL SKILLS:  unknown  FRUSTRATION TOLERANCE:  unknown  ATTENTION SEEKING: unknown  COOPERATION: unknown  AFFECT: unknown  APPEARANCE: unknown    HEARING:  unknown  VISION:  unknown   VERBAL COMMUNICATION:  unknown  WRITTEN COMMUNICATION:  unknown    COORDINATION: unknown   MOBILITY: unknown    GOALS:  unknown                 
[facility]   Nephrology progress note  4/10/2024, 11:16 AM      Pt Name:    Trav Jennings  MRN:     [id number]     YOB: 1965  Admit Date:    4/8/2024  7:58 PM    Chief Complaint: Nephrology following for ESRD and HD    Subjective:  Patient was seen and examined this morning  No chest pain or shortness of breath  Feels okay    Objective:  24HR INTAKE/OUTPUT:    Intake/Output Summary (Last 24 hours) at 4/10/2024 1116  Last data filed at 4/9/2024 1148  Gross per 24 hour   Intake --   Output 1500 ml   Net -1500 ml         I/O last 3 completed shifts:  In: -   Out: 1500   No intake/output data recorded.   Admission weight: 91.2 kg (201 lb)  Wt Readings from Last 3 Encounters:   04/09/24 92.5 kg (203 lb 14.8 oz)   04/08/24 91.3 kg (201 lb 4.5 oz)   04/05/24 96.2 kg (212 lb 1.3 oz)        Vitals :   Vitals:    04/09/24 1637 04/09/24 1930 04/09/24 2336 04/10/24 1037   BP: 136/62 (!) 107/46 136/64 (!) 200/96   Pulse:  84     Resp:  14     Temp:  98.4 °F (36.9 °C)     TempSrc:  Oral     SpO2:  97%     Weight:       Height:           Physical examination  General Appearance: alert and cooperative with exam, appears comfortable, no distress  Mouth/Throat: Oral mucosa moist  Neck: No JVD  Lungs: Air entry B/L, no rales, no use of accessory muscles  Heart:  S1, S2 heard  GI: soft, non-tender, no guarding  Extremities: no LE edema    Medications:  Infusion:   Meds:    sodium zirconium cyclosilicate  10 g Oral TID    amLODIPine  10 mg Oral Daily    aspirin  81 mg Oral Daily    calcitRIOL  0.5 mcg Oral Once per day on Mon Wed Fri    cinacalcet  90 mg Oral Once per day on Mon Wed Fri    cloNIDine  0.3 mg Oral TID    doxazosin  4 mg Oral 2 times per day    famotidine  20 mg Oral Daily    ferrous gluconate  360 mg Oral TID WC    fluticasone  1 spray Each Nostril Daily    folic acid  1 mg Oral Daily    hydrALAZINE  100 mg Oral q8h    isosorbide mononitrate  120 mg Oral BID    sertraline  100 mg Oral 
[facility]   Nephrology progress note  4/12/2024, 10:29 AM      Pt Name:    Trav Jennings  MRN:     [id number]     YOB: 1965  Admit Date:    4/8/2024  7:58 PM    Chief Complaint: Nephrology following for ESRD and HD    Subjective:  Patient was seen and examined this morning  No chest pain or shortness of breath  Feels okay  Seen on dialysis    Objective:  24HR INTAKE/OUTPUT:  No intake or output data in the 24 hours ending 04/12/24 1029        No intake/output data recorded.  No intake/output data recorded.   Admission weight: 91.2 kg (201 lb)  Wt Readings from Last 3 Encounters:   04/12/24 99.7 kg (219 lb 12.8 oz)   04/08/24 91.3 kg (201 lb 4.5 oz)   04/05/24 96.2 kg (212 lb 1.3 oz)        Vitals :   Vitals:    04/11/24 2131 04/12/24 0728 04/12/24 0729 04/12/24 0805   BP: (!) 170/73  (!) 180/79 (!) 195/90   Pulse:   88 87   Resp:   18 18   Temp:   97 °F (36.1 °C) 97.7 °F (36.5 °C)   TempSrc:   Oral    SpO2:   95% 96%   Weight:  100.5 kg (221 lb 8 oz)  99.7 kg (219 lb 12.8 oz)   Height:           Physical examination  General Appearance: alert and cooperative with exam, appears comfortable, no distress  Mouth/Throat: Oral mucosa moist  Neck: No JVD  Lungs: Air entry B/L, no rales, no use of accessory muscles  Heart:  S1, S2 heard  GI: soft, non-tender, no guarding  Extremities: no LE edema    Medications:  Infusion:   Meds:    sertraline  150 mg Oral Daily    amLODIPine  10 mg Oral Daily    aspirin  81 mg Oral Daily    calcitRIOL  0.5 mcg Oral Once per day on Mon Wed Fri    cinacalcet  90 mg Oral Once per day on Mon Wed Fri    cloNIDine  0.3 mg Oral TID    doxazosin  4 mg Oral 2 times per day    famotidine  20 mg Oral Daily    ferrous gluconate  360 mg Oral TID WC    fluticasone  1 spray Each Nostril Daily    folic acid  1 mg Oral Daily    hydrALAZINE  100 mg Oral q8h    isosorbide mononitrate  120 mg Oral BID    Vitamin D  1,000 Units Oral Daily    vitamin E  400 Units Oral Daily 
[facility]   Nephrology progress note  4/14/2024, 8:50 PM      Pt Name:    Trav Jennings  MRN:     [id number]     YOB: 1965  Admit Date:    4/8/2024  7:58 PM    Chief Complaint: Nephrology following for ESRD and HD    Subjective:  Patient was seen and examined this morning  No chest pain or shortness of breath  No other complaints    Objective:  24HR INTAKE/OUTPUT:  No intake or output data in the 24 hours ending 04/14/24 2050        I/O last 3 completed shifts:  In: 300 [P.O.:300]  Out: -   No intake/output data recorded.   Admission weight: 91.2 kg (201 lb)  Wt Readings from Last 3 Encounters:   04/12/24 94.9 kg (209 lb 3.5 oz)   04/08/24 91.3 kg (201 lb 4.5 oz)   04/05/24 96.2 kg (212 lb 1.3 oz)        Vitals :   Vitals:    04/14/24 0843 04/14/24 1457 04/14/24 1550 04/14/24 1950   BP: (!) 178/85 (!) 158/89 (!) 152/88 (!) 140/69   Pulse: 88  82 (!) 154   Resp: 18  18 18   Temp: 97.6 °F (36.4 °C)  97.7 °F (36.5 °C) 97.9 °F (36.6 °C)   TempSrc: Tympanic  Tympanic Oral   SpO2: 98%   97%   Weight:       Height:           Physical examination  General Appearance: alert and cooperative with exam, appears comfortable, no distress  Mouth/Throat: Oral mucosa moist  Neck: No JVD  Lungs: Air entry B/L, no rales, no use of accessory muscles  Heart:  S1, S2 heard  GI: soft, non-tender, no guarding  Extremities: no LE edema    Medications:  Infusion:   Meds:    sertraline  150 mg Oral Daily    amLODIPine  10 mg Oral Daily    aspirin  81 mg Oral Daily    calcitRIOL  0.5 mcg Oral Once per day on Mon Wed Fri    cinacalcet  90 mg Oral Once per day on Mon Wed Fri    cloNIDine  0.3 mg Oral TID    doxazosin  4 mg Oral 2 times per day    famotidine  20 mg Oral Daily    ferrous gluconate  360 mg Oral TID WC    fluticasone  1 spray Each Nostril Daily    folic acid  1 mg Oral Daily    hydrALAZINE  100 mg Oral q8h    isosorbide mononitrate  120 mg Oral BID    Vitamin D  1,000 Units Oral Daily    vitamin E 
[facility]   Nephrology progress note  4/15/2024, 9:29 AM      Pt Name:    Trav Jennings  MRN:     [id number]     YOB: 1965  Admit Date:    4/8/2024  7:58 PM    Chief Complaint: Nephrology following for ESRD and HD    Subjective:  Patient seen and evaluated in the dialysis unit.  States that he is feeling quite well.  Notes some mild shortness of breath but denies any chest pain.  Typically on dialysis Tuesday Thursday and Saturday.  Notes that he had did \"cheat\" on his typical diet over the weekend.    Objective:  24HR INTAKE/OUTPUT:  No intake or output data in the 24 hours ending 04/15/24 0929        No intake/output data recorded.  No intake/output data recorded.   Admission weight: 91.2 kg (201 lb)  Wt Readings from Last 3 Encounters:   04/15/24 101.5 kg (223 lb 12.3 oz)   04/08/24 91.3 kg (201 lb 4.5 oz)   04/05/24 96.2 kg (212 lb 1.3 oz)        Vitals :   Vitals:    04/14/24 1950 04/14/24 2238 04/15/24 0615 04/15/24 0710   BP: (!) 140/69 (!) 140/69 (!) 184/73 (!) 154/65   Pulse: (!) 154  82 83   Resp: 18  18 18   Temp: 97.9 °F (36.6 °C)  98.4 °F (36.9 °C) 97.5 °F (36.4 °C)   TempSrc: Oral  Oral    SpO2: 97%  96%    Weight:   101.2 kg (223 lb) 101.5 kg (223 lb 12.3 oz)   Height:           Physical examination  General Appearance: alert and cooperative with exam, appears comfortable, no distress  Mouth/Throat: Oral mucosa moist  Neck: No JVD  Lungs: Air entry B/L, no rales, no use of accessory muscles, mild inspiratory wheeze  Heart:  S1, S2 heard.  Murmur present.  GI: soft, non-tender, no guarding  Extremities: Very trace nonpitting edema.    Medications:  Infusion:   Meds:    sodium zirconium cyclosilicate  10 g Oral Daily    cetirizine  10 mg Oral Daily    sertraline  150 mg Oral Daily    amLODIPine  10 mg Oral Daily    aspirin  81 mg Oral Daily    calcitRIOL  0.5 mcg Oral Once per day on Mon Wed Fri    cinacalcet  90 mg Oral Once per day on Mon Wed Fri    cloNIDine  0.3 
[facility]   Nephrology progress note  4/16/2024, 8:08 AM      Pt Name:    Trav Jennings  MRN:     [id number]     YOB: 1965  Admit Date:    4/8/2024  7:58 PM    Chief Complaint: Nephrology following for ESRD and HD    Subjective:  Patient seen eating breakfast on 4E.  Typically on dialysis Tuesday Thursday and Saturday. States that he is doing quite well. Denies chest pain, shortness of breath, extremity edema, headache, or vision changes. No overnight issues.     Objective:  24HR INTAKE/OUTPUT:    Intake/Output Summary (Last 24 hours) at 4/16/2024 0808  Last data filed at 4/15/2024 1138  Gross per 24 hour   Intake 400 ml   Output 5650 ml   Net -5250 ml           I/O last 3 completed shifts:  In: 400   Out: 5650   No intake/output data recorded.   Admission weight: 91.2 kg (201 lb)  Wt Readings from Last 3 Encounters:   04/16/24 94.6 kg (208 lb 8 oz)   04/08/24 91.3 kg (201 lb 4.5 oz)   04/05/24 96.2 kg (212 lb 1.3 oz)        Vitals :   Vitals:    04/15/24 1930 04/15/24 2321 04/16/24 0645 04/16/24 0745   BP:  134/78  (!) 153/68   Pulse: 84 85  83   Resp:  18  18   Temp:  98.4 °F (36.9 °C)  97.9 °F (36.6 °C)   TempSrc:  Oral  Oral   SpO2:  95%  97%   Weight:   94.6 kg (208 lb 8 oz)    Height:           Physical examination  General Appearance: alert and cooperative with exam, appears comfortable, no distress  Mouth/Throat: Oral mucosa moist  Neck: No JVD  Lungs: Air entry B/L, no rales, no use of accessory muscles. No wheezes.   Heart:  S1, S2 heard.  Murmur present.  GI: soft, non-tender, no guarding  Extremities: no pitting edema.     Medications:  Infusion:   Meds:    sodium zirconium cyclosilicate  10 g Oral TID    cetirizine  10 mg Oral Daily    sertraline  150 mg Oral Daily    amLODIPine  10 mg Oral Daily    aspirin  81 mg Oral Daily    calcitRIOL  0.5 mcg Oral Once per day on Mon Wed Fri    cinacalcet  90 mg Oral Once per day on Mon Wed Fri    cloNIDine  0.3 mg Oral TID    
OQ Admission    Most Recent Score:    79    Baseline Score:   79    Change From Initial: No Reliable Change  Distress Level: Severe       Graph Type:     Total  Most Recent Critical Item Status:  7.     Suicide - I have thoughts of ending my life. Sometimes  11.     Substance Abuse - I use alcohol or a drug to get going in the morning. Frequently  20.     Substance Abuse - People criticize my drinking (or drug use). Frequently  24.     Substance Abuse - I have trouble at work/school or other daily activities because of drinking or drug use. Frequently  
OQ Analyst Form completed.   
One-to-one maintained 1930  Mental status assessment Alert  Patient response to interventions Cooperative  Criteria for discontinuation of 1:1 no longer needing continuous oxygen  Rational for continuance of 1:1 precautions continuous oxygen     One-to-one maintained 2030  Mental status assessment Alert  Patient response to interventions Cooperative  Criteria for discontinuation of 1:1 No longer needing continuous oxygen  Rational for continuance of 1:1 precautions continuous oxygen    One-to-one maintained 2130  Mental status assessment Alert  Patient response to interventions Cooperative  Criteria for discontinuation of 1:1 no longer needing continuous oxygen  Rational for continuance of 1:1 precautions continuous oxygen    One-to-one maintained 2230  Mental status assessment Alert  Patient response to interventions Cooperative  Criteria for discontinuation of 1:1 no longer needing continuous oxygen  Rational for continuance of 1:1 precautions continuous oxygen    One-to-one maintained 2330   Mental status assessment In bed, eyes closed, respirations unlabored  Patient response to interventions Cooperative  Criteria for discontinuation of 1:1 no longer needing continuous oxygen  Rational for continuance of 1:1 precautions continuous oxygen    One-to-one maintained 0030  Mental status assessment in bed;eyes closed, respirations unlabored  Patient response to interventions Cooperative  Criteria for discontinuation of 1:1 no longer needing continuous oxygen  Rational for continuance of 1:1 precautions continuous oxygen    One-to-one maintained 0130  Mental status assessment Alert  Patient response to interventions Cooperative  Criteria for discontinuation of 1:1 no longer needing continuous oxygen  Rational for continuance of 1:1 precautions continuous oxygen    One-to-one maintained 0230  Mental status assessment In bed, eyes closed- respirations unlabored  Patient response to interventions Cooperative  Criteria for 
Patient advised this nurse he did not want his continuous oxygen tonight at bedtime. O2 at 97% and patient denies any shortness of breath. This nurse advised patient if at any time he feels he needs the portable oxygen to let staff know and it can be restarted.  
Patient approached nurses station and asked this nurse to \"check his blood pressure\"  Assessed patients vital signs BP- 124/57, P- 91, O2- 96. Patient refused midnight dose of Hydralazine.  
Patient does not verbalize the need for oxygen at this time. Patient spo2 94% on room air. Patient denies shortness of breath at this time.   
Patient refused blood pressure medications at this time due to blood pressure being 124/54. Patient reports \" I don't take my medications routinely at home, I take them maybe once a week and if I take them when it is low like this I think I might pass out\" Dr. Holm was contacted via perfect serve by this nurse over concerns of blood pressure and lack of parameters on medications and this nurse was directed to contact hospitalist to consult on medications. This nurse contacted hospitalist via [employer] and parameters were placed on blood pressure medications going forward. Patient also reported that he was feeling the need for his oxygen to be place back on for bedtime oxygen saturation currently 93% on room air however patient reports \" I need it when I sleep, I have sleep apnea and I feel short of breath\" Oxygen placed on patient via order in MAR and patient currently on constant observer for safety while on oxygen therapy. Patient is currently resting quietly in bed with oxygen on via nasal cannula at 2L/min. No others concerns verbalized.   
Patient unable to attend wellness group, off unit for dialysis.  
Patient's home medications were opened and counted to inventory for discharge per provider request. Placed in new envelop. Envelope #[id number].Returned to medication storage cabinet.   
Spiritual Support Group Note    Number of Participants in Group: 4                               Time: 1430 1500    Goal: The spirituality group focused on teaching boundaries in relation to time. The goal is to establish a day every week to sabbath. For sabbathing the goal is to reflect on self/family/divine and establish a time every week that is holy (set aside) for rest and reflection. Patient attempted to build a friend group while in group.     Topic:  [x] Spiritual Wellness and Self Care                  [] Hope                     [x] Connecting with Divine/Others        [] Thankfulness and Gratitude               []  Meaningfulness and Purpose               [] Forgiveness               [] Peace               [x] Connect to Community     [] Other:    Participation Level:   [x] Active Listener   [] Minimal   [x] Monopolizing   [x] Interactive   [] No Participation   []  Other:     Attention:   [x] Alert   [x] Distractible   [] Drowsy   [] Poor   [] Other:    Manner:   [x] Cooperative   [] Suspicious   [] Withdrawn   [] Guarded   [] Irritable   [] Inhospitable   [x] Other: Distractable     Others Comments from Group: The patient had previously been in this group two days prior. The patient was more alert and actively distracting. The vacillation from active and attentive participant to distraction happened multiple time in the lesson. The patient aggrandized his past drug use regularly in the encounter. The patient also seemed more alert and participatory for group when not distracting.      04/11/24 1506   Encounter Summary   Encounter Overview/Reason  Behavioral Health   Service Provided For: Patient   Last Encounter  04/11/24   Complexity of Encounter High   Begin Time 1415   End Time  1500   Total Time Calculated 45 min   Behavioral Health    Type  Spirituality Group       
Spiritual Support Group Note    Number of Participants in Group: 4                               Time: [id number]     Goal: The spirituality group focused on teaching boundaries in relation to time. The goal is to establish a day every week to sabbath. For sabbathing the goal is to reflect on self/family/divine and establish a time every week that is holy (set aside) for rest and reflection.    Topic:  [x] Spiritual Wellness and Self Care                  [] Hope                     [x] Connecting with Divine/Others        [] Thankfulness and Gratitude               []  Meaningfulness and Purpose               [] Forgiveness               [] Peace               [x] Connect to Community     [] Other:    Participation Level:   [x] Active Listener   [] Minimal   [] Monopolizing   [x] Interactive   [] No Participation   []  Other:     Attention:   [x] Alert   [x] Distractible   [] Drowsy   [] Poor   [] Other:    Manner:   [x] Cooperative   [] Suspicious   [] Withdrawn   [x] Guarded   [] Irritable   [] Inhospitable   [] Other:     Others Comments from Group: The patient was an active participant in the group. He was quiet for most of the visit but near the end of the group he became particitory. The patient seemed to understand the tenants and purpose of the group. He did lead the conversation a bit beyond it's scope at the end. This change of topics was at the end of group and thus not a distraction.     
Writer called and spoke to pharmacy about patients lokelma scheduled at 1400. Patient received last dose of medication late due to being at dialysis. Pharmacy advised writer to change due time to 1600.   
Content:Normal: Yes  Thought Content: Other (comment) (unremarkable)  Depression Symptoms: No problems reported or observed.  Anxiety Symptoms: No problems reported or observed.  [place] Symptoms: No problems reported or observed.  Hallucinations: None  Delusions: No  Delusions: Other (comment) (NA)  Memory:Normal: Yes  Memory: Other (comment) (unremarkable)  Insight and Judgment: No  Insight and Judgment: Poor judgment, Poor insight    Kasandra Mitchell RN  
[place] to time, [place] to place, [place] to situation  Attention:Normal: Yes  Thought Processes: Unremarkable  Thought Content:Normal: No  Thought Content: Preoccupations  Depression Symptoms: Feelings of helplessness, Feelings of hopelessess, Feelings of worthlessness  Anxiety Symptoms: Generalized  [place] Symptoms: No problems reported or observed.  Hallucinations: Auditory (comment), Visual (comment) (Pt reports hearing voices and seeing shadows on the walls. Pt reports he can see people smiling an evil smile at him.)  Delusions: Yes  Delusions: Paranoid  Memory:Normal: No  Memory: Poor recent, Poor remote  Insight and Judgment: No  Insight and Judgment: Poor judgment, Poor insight    Pt admitted with followings belongings:  Dental Appliances: None  Vision - Corrective Lenses: None  Hearing Aid: None  Jewelry: None  Body Piercings Removed: N/A  Clothing: Jacket/Coat, Footwear, Undergarments, Shirt, Pants  Other Valuables: Other (Comment) (chapstick, bag of oxygen tubing)     Admission order obtained Yes  Belongings sent home with NA. Valuables placed in safe in security envelope, number:  [id number]. Patient's home medications were locked in cabinet.  Patient oriented to surroundings and program expectations and copy of patient rights given. Received admission packet:  Yes  Consents reviewed, signed Yes. Outcomes Questionnaire completed {Responses; yes/refused/notapproropriate:Yes.  \"An Important Message from [employer] About Your Rights\" form reviewed, signed: yes .  \"An Important Message from [employer] About Your Rights\" form reviewed, signed: N/A  Patient verbalize understanding: Yes.    Patient informed of 15 minute safety monitoring: YES/NO/NA: yes          Patient screened positive for suicide risk on CSSR-S (\"yes\" to question #4, 5, OR 6)  Yes.  Physician notified of risk score Yes  Constant Observer Orders received N/A .   2 person skin assessment completed upon admission Pt declined.    Explained 
gluconate (FERGON) tablet 360 mg, 360 mg, Oral, TID WC  fluticasone (FLONASE) 50 MCG/ACT nasal spray 1 spray, 1 spray, Each Nostril, Daily  folic acid (FOLVITE) tablet 1 mg, 1 mg, Oral, Daily  hydrALAZINE (APRESOLINE) tablet 100 mg, 100 mg, Oral, q8h  isosorbide mononitrate (IMDUR) extended release tablet 120 mg, 120 mg, Oral, BID  sertraline (ZOLOFT) tablet 100 mg, 100 mg, Oral, Daily  Vitamin D (CHOLECALCIFEROL) tablet 1,000 Units, 1,000 Units, Oral, Daily  vitamin E capsule 400 Units, 400 Units, Oral, Daily  atorvastatin (LIPITOR) tablet 40 mg, 40 mg, Oral, Nightly  acetaminophen (TYLENOL) tablet 650 mg, 650 mg, Oral, Q4H PRN  polyethylene glycol (GLYCOLAX) packet 17 g, 17 g, Oral, Daily PRN  epoetin qiana-epbx (RETACRIT) injection 6,000 Units, 6,000 Units, SubCUTAneous, Once per day on Tue Thu Sat  Facility-Administered Medications Ordered in Other Encounters: HYDROmorphone (DILAUDID) injection 1 mg, 1 mg, IntraVENous, Once  midazolam (VERSED) injection 1 mg, 1 mg, IntraVENous, Once    ASSESSMENT  Substance induced mood disorder (HCC)     PLAN  Patient's symptoms are improving some  Continue with current medications.  Attempt to develop insight  Psycho-education conducted.  Supportive Therapy conducted.    
mononitrate  120 mg Oral BID    Vitamin D  1,000 Units Oral Daily    vitamin E  400 Units Oral Daily    atorvastatin  40 mg Oral Nightly    epoetin qiana-epbx  6,000 Units SubCUTAneous Once per day on Tue Thu Sat     Meds prn: acetaminophen, traZODone, polyethylene glycol     Lab Data :  CBC:   No results for input(s): \"WBC\", \"HGB\", \"HCT\", \"PLT\" in the last 72 hours.    CMP:  Recent Labs     04/11/24  0716 04/12/24  0654 04/13/24  0656    140 143   K 4.3 4.9 5.0    99 103   CO2 28 26 24   BUN 33* 53* 45*   CREATININE 5.5* 7.5* 6.9*   GLUCOSE 90 91 96   CALCIUM 7.5* 7.7* 7.9*       Hepatic: No results for input(s): \"LABALBU\", \"AST\", \"ALT\", \"ALB\", \"BILITOT\", \"ALKPHOS\" in the last 72 hours.      Assessment and Plan:  ESRD on HD  Volume status and electrolytes stable  No hd for today  Hyperkalemia: improved  HTN:  reasonable  Anemia in ESRD  Substance use  Anemia in ESRD  Suicidal ideation: psychiatry following      D/W patient anu Pruett MD  [facility]    This report has been created using voice recognition software. It may contain minor errors which are inherent in voice recognition technology  
/100 wbc Final    [place] [address]    Glucose 04/06/2024 87  70 - 108 mg/dL Final    Creatinine 04/06/2024 8.2 (HH)  0.4 - 1.2 mg/dL Final    BUN 04/06/2024 60 (H)  7 - 22 mg/dL Final    Sodium 04/06/2024 137  135 - 145 meq/L Final    Potassium 04/06/2024 5.8 (H)  3.5 - 5.2 meq/L Final    Chloride 04/06/2024 99  98 - 111 meq/L Final    CO2 04/06/2024 22 (L)  23 - 33 meq/L Final    Calcium 04/06/2024 8.4 (L)  8.5 - 10.5 mg/dL Final    AST 04/06/2024 14  5 - 40 U/L Final    Alkaline Phosphatase 04/06/2024 134 (H)  38 - 126 U/L Final    Total Protein 04/06/2024 7.4  6.1 - 8.0 g/dL Final    Albumin 04/06/2024 4.0  3.5 - 5.1 g/dL Final    Total Bilirubin 04/06/2024 0.3  0.3 - 1.2 mg/dL Final    ALT 04/06/2024 10 (L)  11 - 66 U/L Final    [place] [address]    Magnesium 04/06/2024 2.5 (H)  1.6 - 2.4 mg/dL [place] [address]    Troponin, High Sensitivity 04/06/2024 104 (H)  0 - 12 ng/L Final    Comment:     The high-sensitivity troponin T result should not be compared with other  troponin methodologies.  Rising or falling high-sensitivity troponin T is significant if >= 6[place] [address]      Pro-BNP 04/06/2024 80827.0 (H)  0.0 - 124.0 pg/mL [place] [address]    Calcium, Ionized 04/06/2024 1.08 (L)  1.12 - 1.32 mmol/L Final    [place] [address]    Phosphorus 04/06/2024 4.7  2.4 - 4.7 mg/dL Final    [place] [address]    ETHYL ALCOHOL, SERUM 04/06/2024 < 0.01  0.00 % Final    [place] [address]    Anion Gap 04/06/2024 16.0  8.0 - 16.0 meq/L Final    Comment: ANION GAP = Sodium -(Chloride + CO2)  Performed at [facility]

## 2024-04-16 NOTE — PLAN OF CARE
Problem: Chronic Conditions and Co-morbidities  Goal: Patient's chronic conditions and co-morbidity symptoms are monitored and maintained or improved  4/10/2024 2206 by Jean Pires RN  Outcome: Progressing  Flowsheets (Taken 4/9/2024 1039 by Alondra Antoine, RN)  Care Plan - Patient's Chronic Conditions and Co-Morbidity Symptoms are Monitored and Maintained or Improved: Monitor and assess patient's chronic conditions and comorbid symptoms for stability, deterioration, or improvement  4/10/2024 1308 by Humes, Heather, RN  Outcome: Progressing  Flowsheets (Taken 4/9/2024 1039 by Alondra Antoine, RN)  Care Plan - Patient's Chronic Conditions and Co-Morbidity Symptoms are Monitored and Maintained or Improved: Monitor and assess patient's chronic conditions and comorbid symptoms for stability, deterioration, or improvement  Note: Patient had dialysis this morning and tolerated the procedure well. Patient alert and oriented x4, denies SOB and is not using oxygen at this time.      Problem: Risk for Elopement  Goal: Patient will not exit the unit/facility without proper excort  4/10/2024 2206 by Jean Pires RN  Outcome: Progressing  Flowsheets (Taken 4/10/2024 2206)  Nursing Interventions for Elopement Risk: Make sure patient has all necessary personal care items  Note: Patient has not been observed to be checking the exit doors or demonstrating behaviors of attempting to leave the unit.    4/10/2024 1308 by Humes, Heather, RN  Outcome: Progressing  Flowsheets (Taken 4/9/2024 2124 by Ban Coello, RN)  Nursing Interventions for Elopement Risk: Make sure patient has all necessary personal care items  Note: No attempts to elope so far this shift.      Problem: Self Harm/Suicidality  Goal: Will have no self-injury during hospital stay  Description: INTERVENTIONS:  1.  Ensure constant observer at bedside with Q15M safety checks  2.  Maintain a safe environment  3.  Secure patient belongings  4.  Ensure family/visitors adhere 
  Problem: Chronic Conditions and Co-morbidities  Goal: Patient's chronic conditions and co-morbidity symptoms are monitored and maintained or improved  4/11/2024 0932 by Yumiko Rangel LPN  Outcome: Progressing  Note: Patients conditions and symptoms are being monitored and maintained  4/10/2024 2206 by Jean Pires RN  Outcome: Progressing  Flowsheets (Taken 4/9/2024 1039 by Alondra Antoine RN)  Care Plan - Patient's Chronic Conditions and Co-Morbidity Symptoms are Monitored and Maintained or Improved: Monitor and assess patient's chronic conditions and comorbid symptoms for stability, deterioration, or improvement     Problem: Risk for Elopement  Goal: Patient will not exit the unit/facility without proper excort  4/11/2024 0932 by Yumiko Rangel LPN  Outcome: Progressing  Note: Patient has not attempted to exit the facility at this time, will continue to monitor  4/10/2024 2206 by Jean Pires RN  Outcome: Progressing  Flowsheets (Taken 4/10/2024 2206)  Nursing Interventions for Elopement Risk: Make sure patient has all necessary personal care items  Note: Patient has not been observed to be checking the exit doors or demonstrating behaviors of attempting to leave the unit.       Problem: Self Harm/Suicidality  Goal: Will have no self-injury during hospital stay  Description: INTERVENTIONS:  1.  Ensure constant observer at bedside with Q15M safety checks  2.  Maintain a safe environment  3.  Secure patient belongings  4.  Ensure family/visitors adhere to safety recommendations  5.  Ensure safety tray has been added to patient's diet order  6.  Every shift and PRN: Re-assess suicidal risk via Frequent Screener    4/11/2024 0932 by Yumiko Rangel LPN  Outcome: Progressing  Note: No self injury during hospital stay at this time, will continue to monitor  4/10/2024 2206 by Jean Pires RN  Outcome: Progressing  Flowsheets (Taken 4/9/2024 2124 by Ban Coello RN)  Will have no self-injury during hospital 
  Problem: Chronic Conditions and Co-morbidities  Goal: Patient's chronic conditions and co-morbidity symptoms are monitored and maintained or improved  4/12/2024 1330 by Stephanie Garces RN  Outcome: Progressing  Flowsheets (Taken 4/9/2024 1039 by Alondra Antoine, RN)  Care Plan - Patient's Chronic Conditions and Co-Morbidity Symptoms are Monitored and Maintained or Improved: Monitor and assess patient's chronic conditions and comorbid symptoms for stability, deterioration, or improvement  Note: Patient compliant with medications and treatments for chronic conditions at this time.  4/12/2024 0131 by Samantha Lloyd, RN  Outcome: Progressing  4/12/2024 0126 by Samantha Lloyd RN  Outcome: Progressing  Note: Patient having dialysis M-W-F, taking his antihypertensive and following diet     Problem: Discharge Planning  Goal: Discharge to home or other facility with appropriate resources  4/12/2024 1330 by Stephanie Garces RN  Outcome: Progressing  Flowsheets (Taken 4/10/2024 2206 by Jean Pires RN)  Discharge to home or other facility with appropriate resources:   Identify barriers to discharge with patient and caregiver   Identify discharge learning needs (meds, wound care, etc)   Arrange for needed discharge resources and transportation as appropriate  Note: Discharge planning is in progress.  4/12/2024 0131 by Samantha Lloyd RN  Outcome: Progressing  Note: Patient is unsure where he will go at discharge or who he will follow with  4/12/2024 0126 by Samantha Lloyd RN  Outcome: Not Progressing  Note: Patient is unsure where he will go at discharge or who he will follow with     Problem: Pain  Goal: Verbalizes/displays adequate comfort level or baseline comfort level  4/12/2024 1330 by Stephanie Garces RN  Outcome: Progressing  Flowsheets (Taken 4/10/2024 2206 by Jean Pires RN)  Verbalizes/displays adequate comfort level or baseline comfort level:   Encourage patient to monitor pain and request assistance   
  Problem: Chronic Conditions and Co-morbidities  Goal: Patient's chronic conditions and co-morbidity symptoms are monitored and maintained or improved  4/14/2024 0914 by Lou Gross RN  Outcome: Progressing  Flowsheets (Taken 4/13/2024 1136)  Care Plan - Patient's Chronic Conditions and Co-Morbidity Symptoms are Monitored and Maintained or Improved: Monitor and assess patient's chronic conditions and comorbid symptoms for stability, deterioration, or improvement  Note: Pt compliant with medications and treatments  4/13/2024 2242 by Samantha Lloyd RN  Outcome: Progressing  Note: Patient compliant with medication and diet restrictions     Problem: Risk for Elopement  Goal: Patient will not exit the unit/facility without proper excort  4/14/2024 0914 by Lou Gross RN  Outcome: Progressing  Flowsheets (Taken 4/12/2024 1330 by Stephanie Garces, RN)  Nursing Interventions for Elopement Risk:   Reduce environmental triggers   Make sure patient has all necessary personal care items  Note: Pt has made no attempt to exit the unit. Pt reports no plan to exit the unit unit he is discharged  4/13/2024 2242 by Samantha Lloyd, RN  Outcome: Progressing  Note: Patient made no attempt to leave the unit     Problem: Self Harm/Suicidality  Goal: Will have no self-injury during hospital stay  Description: INTERVENTIONS:  1.  Ensure constant observer at bedside with Q15M safety checks  2.  Maintain a safe environment  3.  Secure patient belongings  4.  Ensure family/visitors adhere to safety recommendations  5.  Ensure safety tray has been added to patient's diet order  6.  Every shift and PRN: Re-assess suicidal risk via Frequent Screener    4/14/2024 0914 by Lou Gross RN  Outcome: Progressing  Flowsheets (Taken 4/12/2024 1330 by Stephanie Garces, RN)  Will have no self-injury during hospital stay:   Every shift and PRN: Re-assess suicidal risk via Frequent Screener   Maintain a safe 
  Problem: Chronic Conditions and Co-morbidities  Goal: Patient's chronic conditions and co-morbidity symptoms are monitored and maintained or improved  Outcome: Progressing  Flowsheets (Taken 4/9/2024 1039 by Alondra Antoine RN)  Care Plan - Patient's Chronic Conditions and Co-Morbidity Symptoms are Monitored and Maintained or Improved: Monitor and assess patient's chronic conditions and comorbid symptoms for stability, deterioration, or improvement  Note: Patient had dialysis this morning and tolerated the procedure well. Patient alert and oriented x4, denies SOB and is not using oxygen at this time.      Problem: Risk for Elopement  Goal: Patient will not exit the unit/facility without proper excort  Outcome: Progressing  Flowsheets (Taken 4/9/2024 2124 by Ban Coello, RN)  Nursing Interventions for Elopement Risk: Make sure patient has all necessary personal care items  Note: No attempts to elope so far this shift.      Problem: Self Harm/Suicidality  Goal: Will have no self-injury during hospital stay  Description: INTERVENTIONS:  1.  Ensure constant observer at bedside with Q15M safety checks  2.  Maintain a safe environment  3.  Secure patient belongings  4.  Ensure family/visitors adhere to safety recommendations  5.  Ensure safety tray has been added to patient's diet order  6.  Every shift and PRN: Re-assess suicidal risk via Frequent Screener    Outcome: Progressing  Flowsheets (Taken 4/9/2024 2124 by Ban Coello, RN)  Will have no self-injury during hospital stay: Maintain a safe environment  Note: Patient denies thoughts of self harm at this time.      Problem: Depression  Goal: Will be euthymic at discharge  Description: INTERVENTIONS:  1. Administer medication as ordered  2. Provide emotional support via 1:1 interaction with staff  3. Encourage involvement in milieu/groups/activities  4. Monitor for social isolation  Outcome: Progressing  Note: Ongoing, patient currently rates mood as \" fair\".    
I received a request to evaluate the patient's BP medications for this patient. There was concern that the patient would be at risk for hypotension due to being on multiple blood pressure medications as well as on dialysis.    I have proceeded with placing hold parameters on the patient's BP medications at this time (SBP < 100), as these were not present on them previously.    Jimmy Castillo DO, PGY-3  
Pt was seen in HD unit today. Pt had not received any of his BP medications yet during evaluation. BP in HD unit was 200/90. Medications were given and HD was completed. Latest /49. Case discussed with Dr. Brown. Nephrology is managing BP.     Hospitalist team will sign off at this time. Please feel free to contact us with any questions or concerns.     Electronically signed by DEMETRICE Dodd on 4/10/2024 at 2:40 PM    
Provide emotional support with 1:1 interaction with staff  4. Encourage  recovery focused treatment   Outcome: Progressing  Note: Patient denies any detox or withdrawal symptoms     Problem: Sleep Disturbance  Goal: Will exhibit normal sleeping pattern  Description: INTERVENTIONS:  1. Administer medication as ordered  2. Decrease environmental stimuli, including noise, as appropriate  3. Discourage social isolation and naps during the day  Outcome: Progressing  Note: Pt resting quietly with no distress noted     Problem: Involuntary Admit  Goal: Will cooperate with staff recommendations and doctor's orders and will demonstrate appropriate behavior  Description: INTERVENTIONS:  1. Treat underlying conditions and offer medication as ordered  2. Educate regarding involuntary admission procedures and rules  3. Contain excessive/inappropriate behavior per unit and hospital policies  Outcome: Progressing  Note: Pt is taking medications, attending and participating in groups and care planning. Pt has good interaction with staff and peers     Problem: Coping  Goal: Pt/Family able to verbalize concerns and demonstrate effective coping strategies  Description: INTERVENTIONS:  1. Assist patient/family to identify coping skills, available support systems and cultural and spiritual values  2. Provide emotional support, including active listening and acknowledgement of concerns of patient and caregivers  3. Reduce environmental stimuli, as able  4. Instruct patient/family in relaxation techniques, as appropriate  5. Assess for spiritual pain/suffering and initiate Spiritual Care, Psychosocial Clinical Specialist consults as needed  Outcome: Progressing  Note: Patient reports praying, attending groups and talking is his coping     Problem: Discharge Planning  Goal: Discharge to home or other facility with appropriate resources  Outcome: Progressing  Note: Patient [facility] to be discharged to the [facility]     Problem: Pain  Goal: 
or delusions are encouraging self harm or harm to others and intervene as appropriate  4/12/2024 0131 by Samantha Lloyd, RN  Outcome: Progressing  4/12/2024 0126 by Samantha Lloyd RN  Outcome: Progressing  Note: Patient denies hallucinations and delusions and is not seen interacting with stimuli     Problem: Anxiety  Goal: Will report anxiety at manageable levels  Description: INTERVENTIONS:  1. Administer medication as ordered  2. Teach and rehearse alternative coping skills  3. Provide emotional support with 1:1 interaction with staff  4/12/2024 0131 by Samantha Lloyd, RN  Outcome: Progressing  4/12/2024 0126 by Samantha Lloyd, RN  Outcome: Progressing  Note: Patient mood is 8/10 with moderate anxiety and depression     Problem: Drug Abuse/Detox  Goal: Will have no detox symptoms and will verbalize plan for changing drug-related behavior  Description: INTERVENTIONS:  1. Administer medication as ordered  2. Monitor physical status  3. Provide emotional support with 1:1 interaction with staff  4. Encourage  recovery focused treatment   4/12/2024 0131 by Samantha Lloyd, RN  Outcome: Progressing  4/12/2024 0126 by Samantha Lloyd, RN  Outcome: Progressing  Note: Patient denies withdrawal signs and symptoms     Problem: Sleep Disturbance  Goal: Will exhibit normal sleeping pattern  Description: INTERVENTIONS:  1. Administer medication as ordered  2. Decrease environmental stimuli, including noise, as appropriate  3. Discourage social isolation and naps during the day  4/12/2024 0131 by Samantha Lloyd, RN  Outcome: Progressing  4/12/2024 0126 by Samantha Lloyd RN  Outcome: Progressing  Note: Pt resting quietly with no distress noted     Problem: Involuntary Admit  Goal: Will cooperate with staff recommendations and doctor's orders and will demonstrate appropriate behavior  Description: INTERVENTIONS:  1. Treat underlying conditions and offer medication as ordered  2. Educate regarding involuntary admission procedures 
Spiritual Care, Psychosocial Clinical Specialist consults as needed  Outcome: Progressing  Note: Patient praying as coping     Problem: Discharge Planning  Goal: Discharge to home or other facility with appropriate resources  Outcome: Progressing  Note: Patient is calling [facility] for placement today     Problem: Pain  Goal: Verbalizes/displays adequate comfort level or baseline comfort level  Outcome: Progressing  Note: Patient right arm is sore and bruised     Problem: Safety - Adult  Goal: Free from fall injury  Outcome: Progressing  Note: Pt remained safe and free of harm this shift   Care plan reviewed with patient and patient verbalized understanding of the plan of care and contribute to goal setting.    
levels  Description: INTERVENTIONS:  1. Administer medication as ordered  2. Teach and rehearse alternative coping skills  3. Provide emotional support with 1:1 interaction with staff  4/15/2024 2346 by Ban Coello RN  Outcome: Progressing  Flowsheets (Taken 4/15/2024 2346)  Will report anxiety at manageable levels: Provide emotional support with 1:1 interaction with staff  Note: Reports anxiety remains however mostly due to discharge location instability.   4/15/2024 1542 by Alondra Antoine RN  Outcome: Progressing  Flowsheets (Taken 4/15/2024 1230)  Will report anxiety at manageable levels: Administer medication as ordered  Note: Patient reports improvement in anxiety this shift.      Problem: Drug Abuse/Detox  Goal: Will have no detox symptoms and will verbalize plan for changing drug-related behavior  Description: INTERVENTIONS:  1. Administer medication as ordered  2. Monitor physical status  3. Provide emotional support with 1:1 interaction with staff  4. Encourage  recovery focused treatment   4/15/2024 2346 by Ban Coello RN  Outcome: Progressing  Flowsheets (Taken 4/15/2024 2346)  Will have no detox symptoms and will verbalize plan for changing drug-related behavior: Monitor physical status  4/15/2024 1542 by Alondra Antoine RN  Outcome: Progressing  Flowsheets (Taken 4/15/2024 1230)  Will have no detox symptoms and will verbalize plan for changing drug-related behavior: Encourage recovery focused treatment  Note: No symptoms of detox noted.      Problem: Sleep Disturbance  Goal: Will exhibit normal sleeping pattern  Description: INTERVENTIONS:  1. Administer medication as ordered  2. Decrease environmental stimuli, including noise, as appropriate  3. Discourage social isolation and naps during the day  4/15/2024 2346 by Ban Coello RN  Outcome: Progressing  Note: Continues to request PRN trazodone, reports adequate rest.   4/15/2024 1542 by Alondra Antoine RN  Outcome: Progressing  Note: 6.5B   
From Fall Injury: Instruct family/caregiver on patient safety  Note: No falls to this point in the shift.      Problem: Decision Making  Goal: Pt/Family able to effectively weigh alternatives and participate in decision making related to treatment and care  Description: INTERVENTIONS:  1. Determine when there are differences between patient's view, family's view, and healthcare provider's view of condition  2. Facilitate patient and family articulation of goals for care  3. Help patient and family identify pros/cons of alternative solutions  4. Provide information as requested by patient/family  5. Respect patient/family right to receive or not to receive information  6. Serve as a liaison between patient and family and health care team  7. Initiate Consults from Ethics, Palliative Care or initiate Family Care Conference as is appropriate  Recent Flowsheet Documentation  Taken 4/15/2024 1230 by Alondra Antoine, RN  Patient/family able to effectively weigh alternatives and participate in decision making related to treatment and care: Facilitate patient and family articulation of goals for care     Problem: Chronic Conditions and Co-morbidities  Goal: Patient's chronic conditions and co-morbidity symptoms are monitored and maintained or improved  Outcome: Not Progressing  Flowsheets (Taken 4/13/2024 1136 by Lou Gross, RN)  Care Plan - Patient's Chronic Conditions and Co-Morbidity Symptoms are Monitored and Maintained or Improved: Monitor and assess patient's chronic conditions and comorbid symptoms for stability, deterioration, or improvement  Note: Patient has critical potassium level and continued elevation in creatnine. Reported to Dr Brown.      Problem: Discharge Planning  Goal: Discharge to home or other facility with appropriate resources  Outcome: Not Progressing  Flowsheets (Taken 4/10/2024 2206 by Jean Pires, RN)  Discharge to home or other facility with appropriate resources:   Identify 
1039 by Alondra Antoine, RN  Outcome: Progressing  Flowsheets (Taken 4/9/2024 1039)  Patient/family able to verbalize anxieties, fears, and concerns, and demonstrate effective coping: Assist patient/family to identify coping skills, available support systems and cultural and spiritual values  Note: Patient polite, friendly and cooperative this shift.      Problem: Decision Making  Goal: Pt/Family able to effectively weigh alternatives and participate in decision making related to treatment and care  Description: INTERVENTIONS:  1. Determine when there are differences between patient's view, family's view, and healthcare provider's view of condition  2. Facilitate patient and family articulation of goals for care  3. Help patient and family identify pros/cons of alternative solutions  4. Provide information as requested by patient/family  5. Respect patient/family right to receive or not to receive information  6. Serve as a liaison between patient and family and health care team  7. Initiate Consults from Ethics, Palliative Care or initiate Family Care Conference as is appropriate  4/9/2024 2258 by Ban Coello RN  Outcome: Progressing  Note: Patient reports that [facility] will [facility] for future.   4/9/2024 1039 by Alondra Antoine, RN  Outcome: Progressing  Flowsheets (Taken 4/9/2024 1039)  Patient/family able to effectively weigh alternatives and participate in decision making related to treatment and care: Facilitate patient and family articulation of goals for care  Note: Patient able to verbalize need for substance abuse treatment following discharge.      Problem: Discharge Planning  Goal: Discharge to home or other facility with appropriate resources  4/9/2024 2258 by Ban Coello RN  Outcome: Progressing  Note: Reports still unsure of discharge plans and plans to [facility].   4/9/2024 1039 by Alondra Antoine, RN  Outcome: Not 
and caregiver  Note: Patient not discharged this shift. Patient continues to work with care team toward discharge goal.      
importance of use his coping skills on a regular basis to maintain a healthy lifestyle in ongoing recovery     Problem: Discharge Planning  Goal: Discharge to home or other facility with appropriate resources  4/13/2024 2242 by Samantha Lloyd, RN  Outcome: Progressing  Note: Discharge placement and follow up is unknown at this time  4/13/2024 1136 by Lou Gross, RN  Outcome: Progressing  Flowsheets (Taken 4/10/2024 2206 by Jean Pires, RN)  Discharge to home or other facility with appropriate resources:   Identify barriers to discharge with patient and caregiver   Identify discharge learning needs (meds, wound care, etc)   Arrange for needed discharge resources and transportation as appropriate  Note: Pt working on discharge plans by calling the [facility] for rehab then ongoing treatment     Problem: Pain  Goal: Verbalizes/displays adequate comfort level or baseline comfort level  4/13/2024 2242 by Samantha Lloyd, RN  Outcome: Progressing  Note: Patient reports mild pain in his upper right arm, PRN medications are effective  4/13/2024 1136 by Lou Gross RN  Outcome: Progressing  Flowsheets (Taken 4/10/2024 2206 by Jean Pires, RN)  Verbalizes/displays adequate comfort level or baseline comfort level:   Encourage patient to monitor pain and request assistance   Administer analgesics based on type and severity of pain and evaluate response   Assess pain using appropriate pain scale  Note: Pt denies pain at this time, he was encouraged to reach out with staff should he begin to feel discomfort     Problem: Safety - Adult  Goal: Free from fall injury  4/13/2024 2242 by Samantha Lloyd RN  Outcome: Progressing  Note: Pt remained safe and free of harm this shift  4/13/2024 1136 by Lou Gross, RN  Outcome: Progressing  Flowsheets (Taken 4/12/2024 1330 by Stephanie Garces, RN)  Free From Fall Injury: Instruct family/caregiver on patient safety  Note: Pt wearing nonskid slippers 
 Patient does verbalize understanding of the plan of care and did contribute to goal setting.\"Stay positive\"

## 2024-04-16 NOTE — DISCHARGE INSTRUCTIONS
[facility] Hotline:  [phone]    Crisis phone numbers:  [facility], and [facility] [phone].  [facility], and [facility] [phone]  [facility] [phone].  [facility] [phone].  [facility] [phone].  [facility], [facility] [phone].    [facility]  [address] [place], [place] [address]  [phone]    [facility]  [address] [facility]  [address] [place], [place] [address]  [place], [place] [id number] [phone]    [facility]  [address] [address] [address] [facility] [place] [place] [address]  [phone]    [facility]  Recovery and Wellness Center  [address] [place] [address]  [phone]    [facility]  [address] [place] [address]  [phone]    [facility]  [address] [place], [place] [id number] [phone]    [facility]  [address] [place] [address]  [phone]    [facility]  Recovery and Wellness Center  [address] [place], [place] [id number] [phone]    [facility]  [address] [address]  [phone]    [address] [address]  [phone]    [facility]  [address] [place], [place] [address] [phone]    [facility]  [address] [place] [address]  [phone]    [facility]  [address] [place], [place] [address] [phone]

## 2024-05-29 RX ORDER — SUCROFERRIC OXYHYDROXIDE 500 MG/1
TABLET, CHEWABLE ORAL
Qty: 90 TABLET | OUTPATIENT
Start: 2024-05-29

## 2024-06-05 ENCOUNTER — HOSPITAL ENCOUNTER (EMERGENCY)
Age: 59
Discharge: HOME | End: 2024-06-05
Payer: MEDICARE

## 2024-06-05 VITALS
BODY MASS INDEX: 25.77 KG/M2 | OXYGEN SATURATION: 98 % | RESPIRATION RATE: 17 BRPM | DIASTOLIC BLOOD PRESSURE: 78 MMHG | HEART RATE: 72 BPM | TEMPERATURE: 97.8 F | SYSTOLIC BLOOD PRESSURE: 163 MMHG | WEIGHT: 207.23 LBS | HEIGHT: 75 IN

## 2024-06-05 DIAGNOSIS — N18.6 ESRD (END STAGE RENAL DISEASE) ON DIALYSIS (HCC): Primary | ICD-10-CM

## 2024-06-05 DIAGNOSIS — E87.5 HYPERKALEMIA: ICD-10-CM

## 2024-06-05 DIAGNOSIS — Z99.2 ESRD (END STAGE RENAL DISEASE) ON DIALYSIS (HCC): Primary | ICD-10-CM

## 2024-06-05 LAB
ANION GAP SERPL CALC-SCNC: 14 MEQ/L (ref 8–16)
BASOPHILS ABSOLUTE: 0 THOU/MM3 (ref 0–0.1)
BASOPHILS NFR BLD AUTO: 0.4 %
BUN SERPL-MCNC: 97 MG/DL (ref 7–22)
CALCIUM SERPL-MCNC: 8.2 MG/DL (ref 8.5–10.5)
CHLORIDE SERPL-SCNC: 107 MEQ/L (ref 98–111)
CO2 SERPL-SCNC: 23 MEQ/L (ref 23–33)
CREAT SERPL-MCNC: 12 MG/DL (ref 0.4–1.2)
DEPRECATED RDW RBC AUTO: 57.3 FL (ref 35–45)
EKG ATRIAL RATE: 79 BPM
EKG P AXIS: 69 DEGREES
EKG P-R INTERVAL: 218 MS
EKG Q-T INTERVAL: 440 MS
EKG QRS DURATION: 110 MS
EKG QTC CALCULATION (BAZETT): 504 MS
EKG R AXIS: -43 DEGREES
EKG T AXIS: 90 DEGREES
EKG VENTRICULAR RATE: 79 BPM
EOSINOPHIL NFR BLD AUTO: 2.9 %
EOSINOPHILS ABSOLUTE: 0.1 THOU/MM3 (ref 0–0.4)
ERYTHROCYTE [DISTWIDTH] IN BLOOD BY AUTOMATED COUNT: 15 % (ref 11.5–14.5)
GFR SERPL CREATININE-BSD FRML MDRD: 4 ML/MIN/1.73M2
GLUCOSE SERPL-MCNC: 116 MG/DL (ref 70–108)
HCT VFR BLD AUTO: 30.1 % (ref 42–52)
HGB BLD-MCNC: 9.3 GM/DL (ref 14–18)
IMM GRANULOCYTES # BLD AUTO: 0.03 THOU/MM3 (ref 0–0.07)
IMM GRANULOCYTES NFR BLD AUTO: 0.6 %
LYMPHOCYTES ABSOLUTE: 0.7 THOU/MM3 (ref 1–4.8)
LYMPHOCYTES NFR BLD AUTO: 13.6 %
MAGNESIUM SERPL-MCNC: 2.6 MG/DL (ref 1.6–2.4)
MCH RBC QN AUTO: 33.2 PG (ref 26–33)
MCHC RBC AUTO-ENTMCNC: 30.9 GM/DL (ref 32.2–35.5)
MCV RBC AUTO: 107.5 FL (ref 80–94)
MONOCYTES ABSOLUTE: 0.5 THOU/MM3 (ref 0.4–1.3)
MONOCYTES NFR BLD AUTO: 10.7 %
NEUTROPHILS ABSOLUTE: 3.5 THOU/MM3 (ref 1.8–7.7)
NEUTROPHILS NFR BLD AUTO: 71.8 %
NRBC BLD AUTO-RTO: 0 /100 WBC
OSMOLALITY SERPL CALC.SUM OF ELEC: 317.9 MOSMOL/KG (ref 275–300)
PHOSPHATE SERPL-MCNC: 6.2 MG/DL (ref 2.4–4.7)
PLATELET # BLD AUTO: 112 THOU/MM3 (ref 130–400)
PMV BLD AUTO: 10.1 FL (ref 9.4–12.4)
POTASSIUM SERPL-SCNC: 5.7 MEQ/L (ref 3.5–5.2)
RBC # BLD AUTO: 2.8 MILL/MM3 (ref 4.7–6.1)
SODIUM SERPL-SCNC: 144 MEQ/L (ref 135–145)
WBC # BLD AUTO: 4.9 THOU/MM3 (ref 4.8–10.8)

## 2024-06-05 PROCEDURE — 99284 EMERGENCY DEPT VISIT MOD MDM: CPT

## 2024-06-05 PROCEDURE — 36415 COLL VENOUS BLD VENIPUNCTURE: CPT

## 2024-06-05 PROCEDURE — 84100 ASSAY OF PHOSPHORUS: CPT

## 2024-06-05 PROCEDURE — 93005 ELECTROCARDIOGRAM TRACING: CPT | Performed by: STUDENT IN AN ORGANIZED HEALTH CARE EDUCATION/TRAINING PROGRAM

## 2024-06-05 PROCEDURE — 85025 COMPLETE CBC W/AUTO DIFF WBC: CPT

## 2024-06-05 PROCEDURE — 6370000000 HC RX 637 (ALT 250 FOR IP): Performed by: NURSE PRACTITIONER

## 2024-06-05 PROCEDURE — 80048 BASIC METABOLIC PNL TOTAL CA: CPT

## 2024-06-05 PROCEDURE — 93010 ELECTROCARDIOGRAM REPORT: CPT | Performed by: NUCLEAR MEDICINE

## 2024-06-05 PROCEDURE — 83735 ASSAY OF MAGNESIUM: CPT

## 2024-06-05 RX ADMIN — SODIUM ZIRCONIUM CYCLOSILICATE 10 G: 10 POWDER, FOR SUSPENSION ORAL at 08:38

## 2024-06-05 ASSESSMENT — PAIN - FUNCTIONAL ASSESSMENT
PAIN_FUNCTIONAL_ASSESSMENT: NONE - DENIES PAIN
PAIN_FUNCTIONAL_ASSESSMENT: NONE - DENIES PAIN

## 2024-06-05 NOTE — ED NOTES
This RN assumed care at this time. Pt resting in bed, respirations even and unlabored. No needs expressed at this time. Call light within reach. VSS

## 2024-06-05 NOTE — ED TRIAGE NOTES
Pt to Ed c/o swollen legs and feeling SOB. Pt states he missed dialysis yesterday and needs it done today. Pt states he went to his dialysis clinic and they stated they cannot get him in today. Pt VS stable. EKG and labs obtained.

## 2024-06-27 RX ORDER — ATORVASTATIN CALCIUM 80 MG/1
40 TABLET, FILM COATED ORAL DAILY
Qty: 15 TABLET | Refills: 0 | OUTPATIENT
Start: 2024-06-27

## 2024-08-04 ENCOUNTER — HOSPITAL ENCOUNTER (INPATIENT)
Age: 59
LOS: 2 days | Discharge: HOME | DRG: 640 | End: 2024-08-06
Attending: EMERGENCY MEDICINE | Admitting: INTERNAL MEDICINE
Payer: MEDICARE

## 2024-08-04 ENCOUNTER — APPOINTMENT (OUTPATIENT)
Dept: GENERAL RADIOLOGY | Age: 59
DRG: 640 | End: 2024-08-04
Payer: MEDICARE

## 2024-08-04 DIAGNOSIS — N18.6 ESRD (END STAGE RENAL DISEASE) ON DIALYSIS (HCC): ICD-10-CM

## 2024-08-04 DIAGNOSIS — Z99.2 ESRD (END STAGE RENAL DISEASE) ON DIALYSIS (HCC): ICD-10-CM

## 2024-08-04 DIAGNOSIS — F14.10 COCAINE ABUSE (HCC): ICD-10-CM

## 2024-08-04 DIAGNOSIS — I1A.0 RESISTANT HYPERTENSION: ICD-10-CM

## 2024-08-04 DIAGNOSIS — E87.79 OTHER HYPERVOLEMIA: ICD-10-CM

## 2024-08-04 DIAGNOSIS — R07.9 CHEST PAIN, UNSPECIFIED TYPE: Primary | ICD-10-CM

## 2024-08-04 PROBLEM — R06.00 DYSPNEA, UNSPECIFIED: Status: ACTIVE | Noted: 2024-08-04

## 2024-08-04 LAB
ALBUMIN SERPL BCG-MCNC: 4.3 G/DL (ref 3.5–5.1)
ALP SERPL-CCNC: 122 U/L (ref 38–126)
ALT SERPL W/O P-5'-P-CCNC: 13 U/L (ref 11–66)
ANION GAP SERPL CALC-SCNC: 22 MEQ/L (ref 8–16)
AST SERPL-CCNC: 20 U/L (ref 5–40)
BASOPHILS ABSOLUTE: 0 THOU/MM3 (ref 0–0.1)
BASOPHILS NFR BLD AUTO: 0.4 %
BILIRUB SERPL-MCNC: 0.4 MG/DL (ref 0.3–1.2)
BUN SERPL-MCNC: 62 MG/DL (ref 7–22)
CALCIUM SERPL-MCNC: 9.3 MG/DL (ref 8.5–10.5)
CHLORIDE SERPL-SCNC: 96 MEQ/L (ref 98–111)
CO2 SERPL-SCNC: 21 MEQ/L (ref 23–33)
CREAT SERPL-MCNC: 11 MG/DL (ref 0.4–1.2)
DEPRECATED RDW RBC AUTO: 56 FL (ref 35–45)
EKG ATRIAL RATE: 97 BPM
EKG P AXIS: 64 DEGREES
EKG P-R INTERVAL: 184 MS
EKG Q-T INTERVAL: 398 MS
EKG QRS DURATION: 110 MS
EKG QTC CALCULATION (BAZETT): 505 MS
EKG R AXIS: -46 DEGREES
EKG T AXIS: 85 DEGREES
EKG VENTRICULAR RATE: 97 BPM
EOSINOPHIL NFR BLD AUTO: 2.1 %
EOSINOPHILS ABSOLUTE: 0.2 THOU/MM3 (ref 0–0.4)
ERYTHROCYTE [DISTWIDTH] IN BLOOD BY AUTOMATED COUNT: 14.6 % (ref 11.5–14.5)
GFR SERPL CREATININE-BSD FRML MDRD: 5 ML/MIN/1.73M2
GLUCOSE BLD STRIP.AUTO-MCNC: 112 MG/DL (ref 70–108)
GLUCOSE BLD STRIP.AUTO-MCNC: 119 MG/DL (ref 70–108)
GLUCOSE BLD STRIP.AUTO-MCNC: 133 MG/DL (ref 70–108)
GLUCOSE SERPL-MCNC: 91 MG/DL (ref 70–108)
HCT VFR BLD AUTO: 39.6 % (ref 42–52)
HGB BLD-MCNC: 12.2 GM/DL (ref 14–18)
IMM GRANULOCYTES # BLD AUTO: 0.02 THOU/MM3 (ref 0–0.07)
IMM GRANULOCYTES NFR BLD AUTO: 0.3 %
LYMPHOCYTES ABSOLUTE: 0.7 THOU/MM3 (ref 1–4.8)
LYMPHOCYTES NFR BLD AUTO: 9.1 %
MAGNESIUM SERPL-MCNC: 2.3 MG/DL (ref 1.6–2.4)
MCH RBC QN AUTO: 32 PG (ref 26–33)
MCHC RBC AUTO-ENTMCNC: 30.8 GM/DL (ref 32.2–35.5)
MCV RBC AUTO: 103.9 FL (ref 80–94)
MONOCYTES ABSOLUTE: 0.5 THOU/MM3 (ref 0.4–1.3)
MONOCYTES NFR BLD AUTO: 7.5 %
NEUTROPHILS ABSOLUTE: 5.8 THOU/MM3 (ref 1.8–7.7)
NEUTROPHILS NFR BLD AUTO: 80.6 %
NRBC BLD AUTO-RTO: 0 /100 WBC
NT-PROBNP SERPL IA-MCNC: ABNORMAL PG/ML (ref 0–124)
OSMOLALITY SERPL CALC.SUM OF ELEC: 294.7 MOSMOL/KG (ref 275–300)
PLATELET # BLD AUTO: 145 THOU/MM3 (ref 130–400)
PMV BLD AUTO: 10.1 FL (ref 9.4–12.4)
POTASSIUM SERPL-SCNC: 4.3 MEQ/L (ref 3.5–5.2)
PROT SERPL-MCNC: 8 G/DL (ref 6.1–8)
RBC # BLD AUTO: 3.81 MILL/MM3 (ref 4.7–6.1)
SODIUM SERPL-SCNC: 139 MEQ/L (ref 135–145)
TROPONIN, HIGH SENSITIVITY: 101 NG/L (ref 0–12)
WBC # BLD AUTO: 7.2 THOU/MM3 (ref 4.8–10.8)

## 2024-08-04 PROCEDURE — 6360000002 HC RX W HCPCS: Performed by: STUDENT IN AN ORGANIZED HEALTH CARE EDUCATION/TRAINING PROGRAM

## 2024-08-04 PROCEDURE — 71045 X-RAY EXAM CHEST 1 VIEW: CPT

## 2024-08-04 PROCEDURE — 80053 COMPREHEN METABOLIC PANEL: CPT

## 2024-08-04 PROCEDURE — 99223 1ST HOSP IP/OBS HIGH 75: CPT | Performed by: INTERNAL MEDICINE

## 2024-08-04 PROCEDURE — 36415 COLL VENOUS BLD VENIPUNCTURE: CPT

## 2024-08-04 PROCEDURE — G0378 HOSPITAL OBSERVATION PER HR: HCPCS

## 2024-08-04 PROCEDURE — 96374 THER/PROPH/DIAG INJ IV PUSH: CPT

## 2024-08-04 PROCEDURE — 6360000002 HC RX W HCPCS

## 2024-08-04 PROCEDURE — 6370000000 HC RX 637 (ALT 250 FOR IP): Performed by: STUDENT IN AN ORGANIZED HEALTH CARE EDUCATION/TRAINING PROGRAM

## 2024-08-04 PROCEDURE — 93005 ELECTROCARDIOGRAM TRACING: CPT

## 2024-08-04 PROCEDURE — 83735 ASSAY OF MAGNESIUM: CPT

## 2024-08-04 PROCEDURE — 1200000003 HC TELEMETRY R&B

## 2024-08-04 PROCEDURE — 83880 ASSAY OF NATRIURETIC PEPTIDE: CPT

## 2024-08-04 PROCEDURE — 84484 ASSAY OF TROPONIN QUANT: CPT

## 2024-08-04 PROCEDURE — 96376 TX/PRO/DX INJ SAME DRUG ADON: CPT

## 2024-08-04 PROCEDURE — 99285 EMERGENCY DEPT VISIT HI MDM: CPT

## 2024-08-04 PROCEDURE — 6370000000 HC RX 637 (ALT 250 FOR IP)

## 2024-08-04 PROCEDURE — 82948 REAGENT STRIP/BLOOD GLUCOSE: CPT

## 2024-08-04 PROCEDURE — 2580000003 HC RX 258: Performed by: STUDENT IN AN ORGANIZED HEALTH CARE EDUCATION/TRAINING PROGRAM

## 2024-08-04 PROCEDURE — 99222 1ST HOSP IP/OBS MODERATE 55: CPT | Performed by: INTERNAL MEDICINE

## 2024-08-04 PROCEDURE — 93010 ELECTROCARDIOGRAM REPORT: CPT | Performed by: INTERNAL MEDICINE

## 2024-08-04 PROCEDURE — 85025 COMPLETE CBC W/AUTO DIFF WBC: CPT

## 2024-08-04 RX ORDER — POLYETHYLENE GLYCOL 3350 17 G/17G
17 POWDER, FOR SOLUTION ORAL DAILY PRN
Status: DISCONTINUED | OUTPATIENT
Start: 2024-08-04 | End: 2024-08-06 | Stop reason: HOSPADM

## 2024-08-04 RX ORDER — ONDANSETRON 4 MG/1
4 TABLET, ORALLY DISINTEGRATING ORAL EVERY 8 HOURS PRN
Status: DISCONTINUED | OUTPATIENT
Start: 2024-08-04 | End: 2024-08-06 | Stop reason: HOSPADM

## 2024-08-04 RX ORDER — HYDRALAZINE HYDROCHLORIDE 20 MG/ML
10 INJECTION INTRAMUSCULAR; INTRAVENOUS ONCE
Status: COMPLETED | OUTPATIENT
Start: 2024-08-04 | End: 2024-08-04

## 2024-08-04 RX ORDER — ACETAMINOPHEN 325 MG/1
650 TABLET ORAL EVERY 6 HOURS PRN
Status: DISCONTINUED | OUTPATIENT
Start: 2024-08-04 | End: 2024-08-06 | Stop reason: HOSPADM

## 2024-08-04 RX ORDER — ISOSORBIDE MONONITRATE 60 MG/1
120 TABLET, EXTENDED RELEASE ORAL 2 TIMES DAILY
Status: DISCONTINUED | OUTPATIENT
Start: 2024-08-04 | End: 2024-08-04

## 2024-08-04 RX ORDER — ASPIRIN 81 MG/1
324 TABLET, CHEWABLE ORAL ONCE
Status: COMPLETED | OUTPATIENT
Start: 2024-08-04 | End: 2024-08-04

## 2024-08-04 RX ORDER — ATORVASTATIN CALCIUM 40 MG/1
40 TABLET, FILM COATED ORAL DAILY
Status: DISCONTINUED | OUTPATIENT
Start: 2024-08-04 | End: 2024-08-06 | Stop reason: HOSPADM

## 2024-08-04 RX ORDER — LORAZEPAM 1 MG/1
1 TABLET ORAL ONCE
Status: COMPLETED | OUTPATIENT
Start: 2024-08-04 | End: 2024-08-04

## 2024-08-04 RX ORDER — ACETAMINOPHEN 650 MG/1
650 SUPPOSITORY RECTAL EVERY 6 HOURS PRN
Status: DISCONTINUED | OUTPATIENT
Start: 2024-08-04 | End: 2024-08-06 | Stop reason: HOSPADM

## 2024-08-04 RX ORDER — SODIUM CHLORIDE 0.9 % (FLUSH) 0.9 %
5-40 SYRINGE (ML) INJECTION PRN
Status: DISCONTINUED | OUTPATIENT
Start: 2024-08-04 | End: 2024-08-06 | Stop reason: HOSPADM

## 2024-08-04 RX ORDER — ONDANSETRON 2 MG/ML
4 INJECTION INTRAMUSCULAR; INTRAVENOUS EVERY 6 HOURS PRN
Status: DISCONTINUED | OUTPATIENT
Start: 2024-08-04 | End: 2024-08-06 | Stop reason: HOSPADM

## 2024-08-04 RX ORDER — SODIUM CHLORIDE 0.9 % (FLUSH) 0.9 %
5-40 SYRINGE (ML) INJECTION EVERY 12 HOURS SCHEDULED
Status: DISCONTINUED | OUTPATIENT
Start: 2024-08-04 | End: 2024-08-06 | Stop reason: HOSPADM

## 2024-08-04 RX ORDER — NITROGLYCERIN 20 MG/100ML
5-200 INJECTION INTRAVENOUS CONTINUOUS
Status: DISCONTINUED | OUTPATIENT
Start: 2024-08-04 | End: 2024-08-06 | Stop reason: HOSPADM

## 2024-08-04 RX ORDER — HYDRALAZINE HYDROCHLORIDE 50 MG/1
100 TABLET, FILM COATED ORAL EVERY 8 HOURS
Status: DISCONTINUED | OUTPATIENT
Start: 2024-08-04 | End: 2024-08-06 | Stop reason: HOSPADM

## 2024-08-04 RX ORDER — CINACALCET 30 MG/1
90 TABLET, FILM COATED ORAL
Status: DISCONTINUED | OUTPATIENT
Start: 2024-08-05 | End: 2024-08-06 | Stop reason: HOSPADM

## 2024-08-04 RX ORDER — ASPIRIN 81 MG/1
81 TABLET ORAL DAILY
Status: DISCONTINUED | OUTPATIENT
Start: 2024-08-04 | End: 2024-08-06 | Stop reason: HOSPADM

## 2024-08-04 RX ORDER — AMLODIPINE BESYLATE 10 MG/1
10 TABLET ORAL DAILY
Status: DISCONTINUED | OUTPATIENT
Start: 2024-08-04 | End: 2024-08-06 | Stop reason: HOSPADM

## 2024-08-04 RX ORDER — DOXAZOSIN MESYLATE 4 MG/1
4 TABLET ORAL EVERY 12 HOURS SCHEDULED
Status: DISCONTINUED | OUTPATIENT
Start: 2024-08-04 | End: 2024-08-06 | Stop reason: HOSPADM

## 2024-08-04 RX ORDER — HEPARIN SODIUM 5000 [USP'U]/ML
5000 INJECTION, SOLUTION INTRAVENOUS; SUBCUTANEOUS EVERY 8 HOURS SCHEDULED
Status: DISCONTINUED | OUTPATIENT
Start: 2024-08-04 | End: 2024-08-06 | Stop reason: HOSPADM

## 2024-08-04 RX ORDER — TRAZODONE HYDROCHLORIDE 100 MG/1
100 TABLET ORAL NIGHTLY PRN
Status: DISCONTINUED | OUTPATIENT
Start: 2024-08-04 | End: 2024-08-06 | Stop reason: HOSPADM

## 2024-08-04 RX ORDER — SODIUM CHLORIDE 9 MG/ML
INJECTION, SOLUTION INTRAVENOUS PRN
Status: DISCONTINUED | OUTPATIENT
Start: 2024-08-04 | End: 2024-08-06 | Stop reason: HOSPADM

## 2024-08-04 RX ADMIN — HYDRALAZINE HYDROCHLORIDE 100 MG: 50 TABLET ORAL at 10:51

## 2024-08-04 RX ADMIN — LORAZEPAM 1 MG: 1 TABLET ORAL at 05:47

## 2024-08-04 RX ADMIN — ATORVASTATIN CALCIUM 40 MG: 40 TABLET, FILM COATED ORAL at 10:52

## 2024-08-04 RX ADMIN — CLONIDINE HYDROCHLORIDE 0.3 MG: 0.2 TABLET ORAL at 10:52

## 2024-08-04 RX ADMIN — SERTRALINE 150 MG: 50 TABLET, FILM COATED ORAL at 10:51

## 2024-08-04 RX ADMIN — ISOSORBIDE MONONITRATE 120 MG: 60 TABLET, EXTENDED RELEASE ORAL at 10:51

## 2024-08-04 RX ADMIN — AMLODIPINE BESYLATE 10 MG: 10 TABLET ORAL at 10:52

## 2024-08-04 RX ADMIN — HYDRALAZINE HYDROCHLORIDE 10 MG: 20 INJECTION INTRAMUSCULAR; INTRAVENOUS at 07:49

## 2024-08-04 RX ADMIN — HYDRALAZINE HYDROCHLORIDE 10 MG: 20 INJECTION INTRAMUSCULAR; INTRAVENOUS at 05:47

## 2024-08-04 RX ADMIN — ASPIRIN 81 MG: 81 TABLET, COATED ORAL at 10:51

## 2024-08-04 RX ADMIN — HEPARIN SODIUM 5000 UNITS: 5000 INJECTION INTRAVENOUS; SUBCUTANEOUS at 15:26

## 2024-08-04 RX ADMIN — SODIUM CHLORIDE, PRESERVATIVE FREE 10 ML: 5 INJECTION INTRAVENOUS at 20:24

## 2024-08-04 RX ADMIN — HEPARIN SODIUM 5000 UNITS: 5000 INJECTION INTRAVENOUS; SUBCUTANEOUS at 22:32

## 2024-08-04 RX ADMIN — HYDRALAZINE HYDROCHLORIDE 100 MG: 50 TABLET ORAL at 17:07

## 2024-08-04 RX ADMIN — DOXAZOSIN 4 MG: 4 TABLET ORAL at 20:22

## 2024-08-04 RX ADMIN — DOXAZOSIN 4 MG: 4 TABLET ORAL at 10:51

## 2024-08-04 RX ADMIN — SODIUM CHLORIDE, PRESERVATIVE FREE 10 ML: 5 INJECTION INTRAVENOUS at 10:52

## 2024-08-04 RX ADMIN — CLONIDINE HYDROCHLORIDE 0.3 MG: 0.2 TABLET ORAL at 20:22

## 2024-08-04 RX ADMIN — ASPIRIN 81 MG 324 MG: 81 TABLET ORAL at 05:46

## 2024-08-04 ASSESSMENT — PAIN SCALES - GENERAL
PAINLEVEL_OUTOF10: 0
PAINLEVEL_OUTOF10: 5
PAINLEVEL_OUTOF10: 6
PAINLEVEL_OUTOF10: 8
PAINLEVEL_OUTOF10: 0

## 2024-08-04 ASSESSMENT — PAIN DESCRIPTION - LOCATION
LOCATION: ARM;CHEST
LOCATION: CHEST

## 2024-08-04 ASSESSMENT — PAIN DESCRIPTION - DESCRIPTORS
DESCRIPTORS: TIGHTNESS
DESCRIPTORS: PRESSURE

## 2024-08-04 ASSESSMENT — PAIN DESCRIPTION - ORIENTATION: ORIENTATION: LEFT

## 2024-08-04 ASSESSMENT — PAIN DESCRIPTION - PAIN TYPE: TYPE: ACUTE PAIN

## 2024-08-04 ASSESSMENT — PAIN DESCRIPTION - FREQUENCY: FREQUENCY: INTERMITTENT

## 2024-08-04 ASSESSMENT — PAIN - FUNCTIONAL ASSESSMENT
PAIN_FUNCTIONAL_ASSESSMENT: 0-10
PAIN_FUNCTIONAL_ASSESSMENT: 0-10

## 2024-08-04 NOTE — ED TRIAGE NOTES
Patient into the ED with chief complaint of chest pain 30 minutes prior to arrival. Patient reports chest tightness rated 8/10. He states he normally wears 3 L of oxygen at home. He reports that he relapsed and smoked crack yesterday morning which caused him to be without his oxygen all day. He also reports missing hemodialysis yesterday. Patient placed on baseline of 3 L nc, now 93%. RR even and unlabored. EKG complete. VSS.

## 2024-08-04 NOTE — ED PROVIDER NOTES
Transfer of Care Note:   Physician Signing out: Dr. Forman  Receiving Physician: Lucia Zabala DO  Sign out time: 6:45 am      Brief history:    Patient is a 59-year-old male with PMH of ESRD on dialysis TTsat, hypertension, cocaine use disorder, diabetes, empyema, AAA, FSGS, REZA who presented to the ED with left-sided chest pain and dyspnea 30 minutes before walking in. He stated that he smoked crack and missed dialysis 8/3/2024.  On 3 L nasal cannula baseline but was not using his oxygen 8/3/2024.  Noncompliant with medications including BP meds.     In the ED, patient was given Ativan, aspirin, hydralazine 10 mg x 2.  EKG no significant changes from prior and troponin 101 at baseline. BNP 91092.  Electrolytes normal.  Chest x-ray remarkable for opacity in right lung base, chronic small right basilar pleural effusion, interstitial opacities of the right lung, stable cardiomegaly. Nephrology consulted for dialysis to manage his blood pressure.    Items pending that need to be checked:  CBC, CMP, troponin, BNP, magnesium, chest x-ray, EKG      Tentative Impression of patient:  To be admitted    Expected disposition of patient:  Pending results, admitted.        Additional Assessment and results:   I have personally performed a face to face diagnostic evaluation on this patient. The patient's initial evaluation and plan have been discussed with the prior physician who initially evaluated the patient. Nursing Notes, Past Medical Hx, Past Surgical Hx, Social Hx, Allergies, vital signs and Family Hx were all reviewed.      Vitals:    08/04/24 0552   BP: (!) 195/94   Pulse: 93   Resp: 26   Temp:    SpO2: 96%     Physical Exam  Constitutional:       Appearance: He is not toxic-appearing.   Cardiovascular:      Rate and Rhythm: Normal rate and regular rhythm.      Heart sounds: No murmur heard.  Pulmonary:      Breath sounds: Wheezing and rales present.   Musculoskeletal:      Right lower leg: Edema present.

## 2024-08-04 NOTE — ED NOTES
Patient to be transferred to [facility]. Patient is in stable condition at this time. Staff notified prior to transfer.

## 2024-08-04 NOTE — ED PROVIDER NOTES
[facility] EMERGENCY DEPT  EMERGENCY DEPARTMENT ENCOUNTER          Pt Name: Trav Jennings  MRN: [id number]  Birthdate 1965  Date of evaluation: 8/4/2024  Physician: Dickson Bates MD  Supervising Attending Physician: Clare Camacho MD       CHIEF COMPLAINT       Chief Complaint   Patient presents with    Chest Pain         HISTORY OF PRESENT ILLNESS    HPI  Trav Jennings is a 59 y.o. male who presents to the emergency department from home, as a walk in to the ED lobby for evaluation of chest pain shortness of breath.  Patient reports about 30 minutes prior to coming in he began to have chest tightness on the left side of his chest.  He does report some associated shortness of breath.  He states that he has not been feeling well since smoking crack yesterday.  He stated that he also missed his dialysis yesterday.  She does go to dialysis Tuesday, Thursday and Saturdays.  He reports that he is on 3 L nasal cannula at baseline.  He states that he had not been on his oxygen yesterday.  He denies any abdominal pain.  He reports that he has not been taking his medications.  He states that he did not take his blood pressure medication yesterday or today.  The patient has no other acute complaints at this time.            PAST MEDICAL AND SURGICAL HISTORY     Past Medical History:   Diagnosis Date    AAA (abdominal aortic aneurysm) ([facility])     NURIS (acute kidney injury) ([facility]) 9/24/2015    Anemia associated with chronic renal failure     Arthritis     stated in hands    Cocaine abuse ([facility]) 5/10/2014    Depression     Diabetes mellitus ([facility])     pt states he no longer has diabetes he has lost alot of davion.     FSGS (focal segmental glomerulosclerosis) 5/23/2013    Hemodialysis patient ([facility]) 10/17/2016    on hemodialysis with [facility]    Hemorrhoids 1/16/2012    History of blood transfusion     Hyperlipidemia     Hyperphosphatemia 5/21/2016    Hypertension     Left renal

## 2024-08-04 NOTE — ED NOTES
Pt transported to 6K21 by cart in stable condition.   Called 6K and informed Harriet that the patient was on their way to the unit.

## 2024-08-04 NOTE — ED NOTES
Patient resting in bed with unlabored respirations. Patient denies needs at this time. Call light within reach.

## 2024-08-04 NOTE — CARE COORDINATION
Trav Jennings was evaluated today and a DME order was entered for a standard walker because he requires this to successfully complete daily living tasks of personal cares, ambulating, grooming, hygiene, and taking own medications.  A standard walker is necessary due to the patient's impaired ambulation and mobility restrictions and he can ambulate only by using a walker instead of a lesser assistive device, such as a cane or crutch.  The need for this equipment was discussed with the patient and he understands and is in agreement.

## 2024-08-04 NOTE — H&P
I have reviewed the EKG with the following interpretation: EKG with suspected right heart bundle branch block  Imaging: I have reviewed chest x-ray with the following interpretation: Bilateral pulmonary effusions and stable cardiomegaly  Labs: Reviewed, see chart and plan above.       =======================================================================    SUBJECTIVE    Chief Complaint: Chest pain/shortness of breath, secondary to illicit drug use and missed dialysis volume overload.    History Of Present Illness:  Trav Jennings is a 59 y.o. male with PMHx of -American male with a medical history of ESRD on scheduled dialysis TTS, presented to our emergency department for acute onset chest pain and shortness of breath which began warning.    Patient is on dialysis TTS but missed his Thursday dialysis session.  He mentions that he relapsed yesterday night and used crack cocaine.  He has been sober since April of this year but due to current life stresses and things he has been dealing with on a personal level he relapsed and used crack cocaine..    On physical examination in the emergency department patient is lying supine at bedside appears visibly in distress.  He denies any complaints of any nausea, vomiting but does complain of chest pain and shortness of breath    Chest pain is located on the left side of the chest, 6 out of 7 in intensity, quality of pain squeezing and tight.  Pain started this morning.  Pain does radiate to his left-sided neck.  Chest pain slightly improved when moving the neck laterally, pain worsened with exertion.      Plan is to admit patient for urgent dialysis and medical management of his hypertension and respiratory failure.    ED course: In the emergency department patient had a CBC CMP and troponin as well as a BNP.  proBNP was 65,702, troponin was 101.      Chest x-ray ; volume overload and lungs and heart    EKG: NSR with incomplete right bundle branch block        Review  (SENSIPAR) 90 MG tablet   No No   Sig: Take 1 tablet by mouth three times a week   cloNIDine (CATAPRES) 0.3 MG tablet   No No   Sig: Take 1 tablet by mouth 3 times daily   doxazosin (CARDURA) 4 MG tablet   No No   Sig: Take 1 tablet by mouth every 12 hours   hydrALAZINE (APRESOLINE) 100 MG tablet   No No   Sig: Take 1 tablet by mouth every 8 (eight) hours   isosorbide mononitrate (IMDUR) 120 MG extended release tablet   No No   Sig: Take 1 tablet by mouth in the morning and at bedtime   sertraline (ZOLOFT) 50 MG tablet   No No   Sig: Take 3 tablets by mouth daily   traZODone (DESYREL) 100 MG tablet   No No   Sig: Take 1 tablet by mouth nightly as needed for Sleep      Facility-Administered Medications: None     Allergies:  Humalog [insulin lispro], Insulin regular human, and Lisinopril    Past Medical, Family, Social, Surgical Hx      Diagnosis Date    AAA (abdominal aortic aneurysm) ([facility])     NURIS (acute kidney injury) ([facility]) 9/24/2015    Anemia associated with chronic renal failure     Arthritis     stated in hands    Cocaine abuse ([facility]) 5/10/2014    Depression     Diabetes mellitus ([facility])     pt states he no longer has diabetes he has lost alot of davion.     FSGS (focal segmental glomerulosclerosis) 5/23/2013    Hemodialysis patient ([facility]) 10/17/2016    on hemod[facility]    Hemorrhoids 1/16/2012    History of blood transfusion     Hyperlipidemia     Hyperphosphatemia 5/21/2016    Hypertension     Left renal artery stenosis ([facility]) 5/22/2014    Monoclonal (M) protein disease, multiple 'M' protein     Nicotine dependence 6/16/2014    Noncompliance     Pneumonia     Psychiatric problem     PTSD (post-traumatic stress disorder)     Pt vet from [place]    Secondary hyperparathyroidism (of renal origin)     Sleep apnea          Procedure Laterality Date    ABDOMINAL AORTIC ANEURYSM REPAIR      BRONCHOSCOPY N/A 10/6/2022    BRONCHOSCOPY performed by Theodore Ng MD at [facility]

## 2024-08-04 NOTE — CARE COORDINATION
08/04/24 1316   Service Assessment   Patient Orientation Alert and Oriented   Cognition Alert   History Provided By Patient   Primary Caregiver Self   Accompanied By/Relationship no one   Support Systems Spouse/Significant Other;Family Members   Patient's Healthcare Decision Maker is: Legal Next of Kin   PCP Verified by CM Yes   Last Visit to PCP Within last 6 months   Prior Functional Level Independent in ADLs/IADLs   Current Functional Level Independent in ADLs/IADLs   Can patient return to prior living arrangement Yes   Ability to make needs known: Good   Family able to assist with home care needs: Yes   Would you like for me to discuss the discharge plan with any other family members/significant others, and if so, who? No   Financial Resources Medicare;Medicaid   Community Resources None   Social/Functional History   Active  Yes   Discharge Planning   Type of Residence Apartment   Living Arrangements Spouse/Significant Other   Current Services Prior To Admission Durable Medical Equipment;Other (Comment)  (dialysis TTS in [place])   Current DME Prior to Arrival Oxygen Therapy (Comment)  (3L baseline through [facility])   Potential Assistance Needed Durable Medical Equipment   Potential DME Needed Walker   DME Ordered? Walker   Potential Assistance Purchasing Medications No   Type of Home Care Services None   Patient expects to be discharged to: Apartment   Services At/After Discharge   Mode of Transport at Discharge Other (see comment)  (family)   Confirm Follow Up Transport Self       Patient Goals/Plan/Treatment Preferences: Trav is from home with his wife. He has O2 through [facility] (3L baseline). Requests a rollator order for discharge due to his AGUILAR, but reports he won't have the money to pay until he gets it from home, bedside delivery is not an option. Plan to give him printed order to take with him. Denies further needs. He goes to dialysis T[facility] at [facility] in [place]. Follow O2 requirements (currently at

## 2024-08-04 NOTE — PLAN OF CARE
Problem: Discharge Planning  Goal: Discharge to home or other facility with appropriate resources  Outcome: Progressing  Flowsheets (Taken 8/4/2024 0930)  Discharge to home or other facility with appropriate resources: Identify barriers to discharge with patient and caregiver     Problem: Pain  Goal: Verbalizes/displays adequate comfort level or baseline comfort level  Outcome: Progressing  Flowsheets (Taken 8/4/2024 1014)  Verbalizes/displays adequate comfort level or baseline comfort level: Encourage patient to monitor pain and request assistance   Patient aware and updated on plan of care

## 2024-08-05 LAB
ALBUMIN SERPL BCG-MCNC: 3.4 G/DL (ref 3.5–5.1)
ALP SERPL-CCNC: 92 U/L (ref 38–126)
ALT SERPL W/O P-5'-P-CCNC: 10 U/L (ref 11–66)
ANION GAP SERPL CALC-SCNC: 17 MEQ/L (ref 8–16)
AST SERPL-CCNC: 13 U/L (ref 5–40)
BASOPHILS ABSOLUTE: 0 THOU/MM3 (ref 0–0.1)
BASOPHILS NFR BLD AUTO: 0.4 %
BILIRUB SERPL-MCNC: 0.2 MG/DL (ref 0.3–1.2)
BUN SERPL-MCNC: 80 MG/DL (ref 7–22)
CALCIUM SERPL-MCNC: 8.1 MG/DL (ref 8.5–10.5)
CHLORIDE SERPL-SCNC: 100 MEQ/L (ref 98–111)
CO2 SERPL-SCNC: 22 MEQ/L (ref 23–33)
CREAT SERPL-MCNC: 13.3 MG/DL (ref 0.4–1.2)
DEPRECATED RDW RBC AUTO: 55 FL (ref 35–45)
EOSINOPHIL NFR BLD AUTO: 3.3 %
EOSINOPHILS ABSOLUTE: 0.2 THOU/MM3 (ref 0–0.4)
ERYTHROCYTE [DISTWIDTH] IN BLOOD BY AUTOMATED COUNT: 14.4 % (ref 11.5–14.5)
GFR SERPL CREATININE-BSD FRML MDRD: 4 ML/MIN/1.73M2
GLUCOSE BLD STRIP.AUTO-MCNC: 95 MG/DL (ref 70–108)
GLUCOSE SERPL-MCNC: 88 MG/DL (ref 70–108)
HCT VFR BLD AUTO: 32.8 % (ref 42–52)
HGB BLD-MCNC: 10.1 GM/DL (ref 14–18)
IMM GRANULOCYTES # BLD AUTO: 0.01 THOU/MM3 (ref 0–0.07)
IMM GRANULOCYTES NFR BLD AUTO: 0.2 %
LYMPHOCYTES ABSOLUTE: 0.8 THOU/MM3 (ref 1–4.8)
LYMPHOCYTES NFR BLD AUTO: 14.9 %
MCH RBC QN AUTO: 32.3 PG (ref 26–33)
MCHC RBC AUTO-ENTMCNC: 30.8 GM/DL (ref 32.2–35.5)
MCV RBC AUTO: 104.8 FL (ref 80–94)
MONOCYTES ABSOLUTE: 0.5 THOU/MM3 (ref 0.4–1.3)
MONOCYTES NFR BLD AUTO: 10.4 %
NEUTROPHILS ABSOLUTE: 3.6 THOU/MM3 (ref 1.8–7.7)
NEUTROPHILS NFR BLD AUTO: 70.8 %
NRBC BLD AUTO-RTO: 0 /100 WBC
PLATELET # BLD AUTO: 107 THOU/MM3 (ref 130–400)
PMV BLD AUTO: 10.5 FL (ref 9.4–12.4)
POTASSIUM SERPL-SCNC: 5 MEQ/L (ref 3.5–5.2)
PROT SERPL-MCNC: 6.2 G/DL (ref 6.1–8)
RBC # BLD AUTO: 3.13 MILL/MM3 (ref 4.7–6.1)
SODIUM SERPL-SCNC: 139 MEQ/L (ref 135–145)
WBC # BLD AUTO: 5.1 THOU/MM3 (ref 4.8–10.8)

## 2024-08-05 PROCEDURE — 99233 SBSQ HOSP IP/OBS HIGH 50: CPT | Performed by: INTERNAL MEDICINE

## 2024-08-05 PROCEDURE — 5A1D70Z PERFORMANCE OF URINARY FILTRATION, INTERMITTENT, LESS THAN 6 HOURS PER DAY: ICD-10-PCS | Performed by: INTERNAL MEDICINE

## 2024-08-05 PROCEDURE — 80053 COMPREHEN METABOLIC PANEL: CPT

## 2024-08-05 PROCEDURE — 6360000002 HC RX W HCPCS: Performed by: STUDENT IN AN ORGANIZED HEALTH CARE EDUCATION/TRAINING PROGRAM

## 2024-08-05 PROCEDURE — 5A1D70Z PERFORMANCE OF URINARY FILTRATION, INTERMITTENT, LESS THAN 6 HOURS PER DAY: ICD-10-PCS

## 2024-08-05 PROCEDURE — 1200000003 HC TELEMETRY R&B

## 2024-08-05 PROCEDURE — 6370000000 HC RX 637 (ALT 250 FOR IP): Performed by: STUDENT IN AN ORGANIZED HEALTH CARE EDUCATION/TRAINING PROGRAM

## 2024-08-05 PROCEDURE — 36415 COLL VENOUS BLD VENIPUNCTURE: CPT

## 2024-08-05 PROCEDURE — G0378 HOSPITAL OBSERVATION PER HR: HCPCS

## 2024-08-05 PROCEDURE — 82948 REAGENT STRIP/BLOOD GLUCOSE: CPT

## 2024-08-05 PROCEDURE — 85025 COMPLETE CBC W/AUTO DIFF WBC: CPT

## 2024-08-05 PROCEDURE — 90935 HEMODIALYSIS ONE EVALUATION: CPT

## 2024-08-05 PROCEDURE — 2580000003 HC RX 258: Performed by: STUDENT IN AN ORGANIZED HEALTH CARE EDUCATION/TRAINING PROGRAM

## 2024-08-05 PROCEDURE — 90935 HEMODIALYSIS ONE EVALUATION: CPT | Performed by: INTERNAL MEDICINE

## 2024-08-05 RX ADMIN — HYDRALAZINE HYDROCHLORIDE 100 MG: 50 TABLET ORAL at 15:48

## 2024-08-05 RX ADMIN — DOXAZOSIN 4 MG: 4 TABLET ORAL at 20:03

## 2024-08-05 RX ADMIN — HYDRALAZINE HYDROCHLORIDE 100 MG: 50 TABLET ORAL at 13:09

## 2024-08-05 RX ADMIN — CLONIDINE HYDROCHLORIDE 0.3 MG: 0.2 TABLET ORAL at 13:09

## 2024-08-05 RX ADMIN — HEPARIN SODIUM 5000 UNITS: 5000 INJECTION INTRAVENOUS; SUBCUTANEOUS at 13:10

## 2024-08-05 RX ADMIN — HEPARIN SODIUM 5000 UNITS: 5000 INJECTION INTRAVENOUS; SUBCUTANEOUS at 23:24

## 2024-08-05 RX ADMIN — CINACALCET HYDROCHLORIDE 90 MG: 30 TABLET, FILM COATED ORAL at 13:09

## 2024-08-05 RX ADMIN — DOXAZOSIN 4 MG: 4 TABLET ORAL at 13:09

## 2024-08-05 RX ADMIN — HEPARIN SODIUM 5000 UNITS: 5000 INJECTION INTRAVENOUS; SUBCUTANEOUS at 06:36

## 2024-08-05 RX ADMIN — SERTRALINE 150 MG: 50 TABLET, FILM COATED ORAL at 13:09

## 2024-08-05 RX ADMIN — SODIUM CHLORIDE, PRESERVATIVE FREE 10 ML: 5 INJECTION INTRAVENOUS at 20:02

## 2024-08-05 RX ADMIN — SODIUM CHLORIDE, PRESERVATIVE FREE 10 ML: 5 INJECTION INTRAVENOUS at 13:12

## 2024-08-05 RX ADMIN — ATORVASTATIN CALCIUM 40 MG: 40 TABLET, FILM COATED ORAL at 13:09

## 2024-08-05 RX ADMIN — ASPIRIN 81 MG: 81 TABLET, COATED ORAL at 13:09

## 2024-08-05 RX ADMIN — AMLODIPINE BESYLATE 10 MG: 10 TABLET ORAL at 13:09

## 2024-08-05 ASSESSMENT — PAIN SCALES - GENERAL: PAINLEVEL_OUTOF10: 0

## 2024-08-05 NOTE — FLOWSHEET NOTE
Stable 4 hour treatment complete. Removed 3.5 liters of fluid as per order. Tolerated fluid removal well. Pressure held to needle sites times ten minutes each. Dressing clean, dry and intact. Report given to primary Rn. Treatment record printed for scanning into EMR.   08/05/24 0730 08/05/24 1200   Vital Signs   BP (!) 153/80 (!) 182/90   Temp 98.1 °F (36.7 °C) 98.9 °F (37.2 °C)   Pulse 88 86   Respirations 16 17   SpO2 95 % 100 %   Weight - Scale 99.2 kg (218 lb 11.1 oz) 95.7 kg (210 lb 15.7 oz)   Weight Method Bed scale Bed scale   Percent Weight Change 0.92 -3.53   Pain Assessment   Pain Assessment None - Denies Pain  --    Post-Hemodialysis Assessment   Post-Treatment Procedures  --  Blood returned;Access bleeding time < 10 minutes   Machine Disinfection Process  --  Acid/Vinegar Clean;Heat Disinfect;Exterior Machine Disinfection   Blood Volume Processed (Liters)  --  111.5 L   Dialyzer Clearance  --  Lightly streaked   Duration of Treatment (minutes)  --  240 minutes   Heparin Amount Administered During Treatment (mL)  --  0 mL   Hemodialysis Intake (ml)  --  400 ml   Hemodialysis Output (ml)  --  3900 ml   NET Removed (ml)  --  3500   Tolerated Treatment  --  Good

## 2024-08-05 NOTE — PROGRESS NOTES
[facility]   Nephrology progress note  8/5/2024, 9:24 AM      Pt Name:    Trav Jennings  MRN:     [id number]     YOB: 1965  Admit Date:    8/4/2024  5:03 AM    Chief Complaint: Nephrology following for ESRD on hemodialysis.    Subjective:  Patient seen and examined  No chest pain or shortness of breath  Feels okay    Objective:  24HR INTAKE/OUTPUT:    Intake/Output Summary (Last 24 hours) at 8/5/2024 0924  Last data filed at 8/5/2024 0332  Gross per 24 hour   Intake 926 ml   Output --   Net 926 ml      Admission weight: 97 kg (213 lb 13.5 oz)  Wt Readings from Last 3 Encounters:   08/05/24 99.2 kg (218 lb 11.1 oz)   06/05/24 94 kg (207 lb 3.7 oz)   04/08/24 91.3 kg (201 lb 4.5 oz)        Vitals :   Vitals:    08/04/24 2325 08/05/24 0228 08/05/24 0436 08/05/24 0730   BP: (!) 156/71 (!) 143/68 (!) 140/62 (!) 153/80   Pulse: 82 82 81 88   Resp: 18  16 16   Temp: 97.6 °F (36.4 °C)  97.4 °F (36.3 °C) 98.1 °F (36.7 °C)   TempSrc: Oral  Oral    SpO2: 99%  96% 95%   Weight:    99.2 kg (218 lb 11.1 oz)   Height:           Physical examination  General Appearance:  Well developed. No distress  Mouth/Throat:  Oral mucosa moist  Neck:  Supple, no JVD  Lungs:  Breath sounds: Faint crackles  Heart::  S1,S2 heard  Abdomen:  Soft, non - tender  Musculoskeletal:  Edema -some edema noted    Medications:  Infusion:    sodium chloride      [Held by provider] nitroGLYCERIN       Meds:    sertraline  150 mg Oral Daily    hydrALAZINE  100 mg Oral q8h    doxazosin  4 mg Oral 2 times per day    cloNIDine  0.3 mg Oral TID    cinacalcet  90 mg Oral Once per day on Mon Wed Fri    atorvastatin  40 mg Oral Daily    aspirin  81 mg Oral Daily    amLODIPine  10 mg Oral Daily    sodium chloride flush  5-40 mL IntraVENous 2 times per day    heparin (porcine)  5,000 Units SubCUTAneous 3 times per day       Lab Data :  CBC:   Recent Labs     08/04/24  0510 08/05/24  0550   WBC 7.2 5.1   HGB 12.2* 10.1*   HCT 39.6*

## 2024-08-05 NOTE — PLAN OF CARE
Problem: Discharge Planning  Goal: Discharge to home or other facility with appropriate resources  8/4/2024 2341 by Nina Cowan RN  Outcome: Progressing  Flowsheets (Taken 8/4/2024 0930 by Margarita Hernandes RN)  Discharge to home or other facility with appropriate resources: Identify barriers to discharge with patient and caregiver  8/4/2024 1014 by Margarita Hernandes RN  Outcome: Progressing  Flowsheets (Taken 8/4/2024 0930)  Discharge to home or other facility with appropriate resources: Identify barriers to discharge with patient and caregiver     Problem: Pain  Goal: Verbalizes/displays adequate comfort level or baseline comfort level  8/4/2024 2341 by Nina Cowan RN  Outcome: Progressing  Flowsheets (Taken 8/4/2024 1014 by Margarita Hernandes RN)  Verbalizes/displays adequate comfort level or baseline comfort level: Encourage patient to monitor pain and request assistance  8/4/2024 1014 by Margarita Hernandes RN  Outcome: Progressing  Flowsheets (Taken 8/4/2024 1014)  Verbalizes/displays adequate comfort level or baseline comfort level: Encourage patient to monitor pain and request assistance     Problem: Chronic Conditions and Co-morbidities  Goal: Patient's chronic conditions and co-morbidity symptoms are monitored and maintained or improved  Outcome: Progressing  Flowsheets (Taken 8/4/2024 2341)  Care Plan - Patient's Chronic Conditions and Co-Morbidity Symptoms are Monitored and Maintained or Improved: Monitor and assess patient's chronic conditions and comorbid symptoms for stability, deterioration, or improvement

## 2024-08-05 NOTE — PLAN OF CARE
Problem: Discharge Planning  Goal: Discharge to home or other facility with appropriate resources  8/5/2024 1216 by Jyoti Pedroza RN  Outcome: Progressing  Flowsheets (Taken 8/4/2024 0930 by Margarita Hernandes RN)  Discharge to home or other facility with appropriate resources: Identify barriers to discharge with patient and caregiver  8/4/2024 2341 by Nina Cowan RN  Outcome: Progressing  Flowsheets (Taken 8/4/2024 0930 by Margarita Hernandes RN)  Discharge to home or other facility with appropriate resources: Identify barriers to discharge with patient and caregiver     Problem: Pain  Goal: Verbalizes/displays adequate comfort level or baseline comfort level  8/5/2024 1216 by Jyoti Pedroza RN  Outcome: Progressing  Flowsheets (Taken 8/4/2024 1014 by Margarita Hernandes RN)  Verbalizes/displays adequate comfort level or baseline comfort level: Encourage patient to monitor pain and request assistance  8/4/2024 2341 by Nina Cowan RN  Outcome: Progressing  Flowsheets (Taken 8/4/2024 1014 by Margarita Hernandes RN)  Verbalizes/displays adequate comfort level or baseline comfort level: Encourage patient to monitor pain and request assistance     Problem: Chronic Conditions and Co-morbidities  Goal: Patient's chronic conditions and co-morbidity symptoms are monitored and maintained or improved  8/5/2024 1216 by Jyoti Pedroza RN  Outcome: Progressing  Flowsheets (Taken 8/4/2024 2341 by Nina Cowan, RN)  Care Plan - Patient's Chronic Conditions and Co-Morbidity Symptoms are Monitored and Maintained or Improved: Monitor and assess patient's chronic conditions and comorbid symptoms for stability, deterioration, or improvement  8/4/2024 2341 by Nina Cowan RN  Outcome: Progressing  Flowsheets (Taken 8/4/2024 2341)  Care Plan - Patient's Chronic Conditions and Co-Morbidity Symptoms are Monitored and Maintained or Improved: Monitor and assess patient's chronic conditions and comorbid symptoms for  stability, deterioration, or improvement   Care plan reviewed with patient.  Patient verbalize understanding of the plan of care and contribute to goal setting.

## 2024-08-05 NOTE — PROGRESS NOTES
acetaminophen      Intake/Output Summary (Last 24 hours) at 8/5/2024 1907  Last data filed at 8/5/2024 1430  Gross per 24 hour   Intake 1210 ml   Output 3900 ml   Net -2690 ml       Exam:  BP (!) 140/68   Pulse 85   Temp 98.3 °F (36.8 °C) (Oral)   Resp 14   Ht 1.905 m (6' 3\")   Wt 95.7 kg (210 lb 15.7 oz)   SpO2 92%   BMI 26.37 kg/m²     Physical Exam  Constitutional:       Appearance: Normal appearance. He is normal weight.   HENT:      Head: Normocephalic and atraumatic.      Nose: Nose normal.   Eyes:      Conjunctiva/sclera: Conjunctivae normal.   Cardiovascular:      Rate and Rhythm: Normal rate and regular rhythm.      Pulses: Normal pulses.      Heart sounds: Normal heart sounds. No murmur heard.     No friction rub.      Comments: Left arm fistula  Pulmonary:      Effort: Pulmonary effort is normal. No respiratory distress.      Breath sounds: Normal breath sounds. No stridor.   Chest:      Chest wall: No tenderness.   Abdominal:      General: Abdomen is flat. Bowel sounds are normal. There is no distension.      Palpations: Abdomen is soft. There is no mass.      Tenderness: There is no abdominal tenderness.      Hernia: No hernia is present.   Musculoskeletal:         General: Normal range of motion.      Right lower leg: No edema.      Left lower leg: No edema.   Skin:     General: Skin is warm.      Capillary Refill: Capillary refill takes less than 2 seconds.   Neurological:      General: No focal deficit present.      Mental Status: He is alert and oriented to person, place, and time. Mental status is at baseline.   Psychiatric:         Mood and Affect: Mood normal.        All labs reviewed and interpreted by me:  Labs:   Recent Labs     08/04/24  0510 08/05/24  0550   WBC 7.2 5.1   HGB 12.2* 10.1*   HCT 39.6* 32.8*    107*     Recent Labs     08/04/24  0510 08/05/24  0550    139   K 4.3 5.0   CL 96* 100   CO2 21* 22*   BUN 62* 80*   CREATININE 11.0* 13.3*   CALCIUM 9.3 8.1*      Recent Labs     08/04/24  0510 08/05/24  0550   AST 20 13   ALT 13 10*   BILITOT 0.4 0.2*   ALKPHOS 122 92     No results for input(s): \"INR\" in the last 72 hours.  No results for input(s): \"CKTOTAL\", \"TROPONINT\" in the last 72 hours.    Urinalysis:      Lab Results   Component Value Date/Time    NITRU NEGATIVE 04/03/2018 07:45 PM    WBCUA 2-4 04/03/2018 07:45 PM    BACTERIA NONE 04/03/2018 07:45 PM    RBCUA 5-10 04/03/2018 07:45 PM    BLOODU SMALL 04/03/2018 07:45 PM    GLUCOSEU NEGATIVE 04/03/2018 07:45 PM       All radiology images and reports reviewed and interpreted by me:  Radiology:  XR CHEST PORTABLE   Final Result   1. Similar rounded opacity involving the right lung base. If not recently    performed consider CT imaging of the thorax for improved characterization    of this finding.   2. Chronic small right basilar pleural effusion. Some interstitial    opacities of the right lung base could represent interstitial edema.   3. Stable cardiomegaly without evidence of congestive failure.      This document has been electronically signed by: Jimmy York DO on    08/04/2024 06:04 AM          Diet: ADULT DIET; Regular    Microbiology: no  Antibiotics: no    Steroids: no    Telemetry: [x]Yes / []No  Telemetry Review of past 24 hours:  NSR    LDA: []CVC / []PICC / []Midline / []Horan / []Drains / []Mediport / [x]PIV / []None    Labs (still needed?): [x]Yes / []No  IVF (still needed?): []Yes / [x]No    Level of care: []Step Down / [x]Med-Surg  Bed Status: [x]Inpatient / []Observation    DVT Prophylaxis: [] Lovenox / [x] Heparin / [] SCDs / [] Already on Systemic Anticoagulation / [] None     PT/OT: []Yes / [x]No    Disposition:    [x] Home       [] TCU       [] Rehab       [] Psych       [] SNF       [] Long Term Care Facility       [] Other-    Code Status: Full Code      An electronic signature was used to authenticate this note  - Pat Carney MD PGY-2 on 8/5/2024 at 7:07 PM

## 2024-08-06 VITALS
HEIGHT: 75 IN | RESPIRATION RATE: 16 BRPM | WEIGHT: 207.23 LBS | SYSTOLIC BLOOD PRESSURE: 125 MMHG | DIASTOLIC BLOOD PRESSURE: 55 MMHG | TEMPERATURE: 98.4 F | OXYGEN SATURATION: 95 % | BODY MASS INDEX: 25.77 KG/M2 | HEART RATE: 82 BPM

## 2024-08-06 LAB
ANION GAP SERPL CALC-SCNC: 19 MEQ/L (ref 8–16)
BUN SERPL-MCNC: 39 MG/DL (ref 7–22)
CALCIUM SERPL-MCNC: 8 MG/DL (ref 8.5–10.5)
CHLORIDE SERPL-SCNC: 101 MEQ/L (ref 98–111)
CO2 SERPL-SCNC: 21 MEQ/L (ref 23–33)
CREAT SERPL-MCNC: 8.3 MG/DL (ref 0.4–1.2)
DEPRECATED RDW RBC AUTO: 56.7 FL (ref 35–45)
ERYTHROCYTE [DISTWIDTH] IN BLOOD BY AUTOMATED COUNT: 14.3 % (ref 11.5–14.5)
GFR SERPL CREATININE-BSD FRML MDRD: 7 ML/MIN/1.73M2
GLUCOSE SERPL-MCNC: 82 MG/DL (ref 70–108)
HCT VFR BLD AUTO: 33.7 % (ref 42–52)
HGB BLD-MCNC: 10.3 GM/DL (ref 14–18)
MCH RBC QN AUTO: 32.7 PG (ref 26–33)
MCHC RBC AUTO-ENTMCNC: 30.6 GM/DL (ref 32.2–35.5)
MCV RBC AUTO: 107 FL (ref 80–94)
PLATELET # BLD AUTO: 102 THOU/MM3 (ref 130–400)
PMV BLD AUTO: 10.5 FL (ref 9.4–12.4)
POTASSIUM SERPL-SCNC: 4.6 MEQ/L (ref 3.5–5.2)
RBC # BLD AUTO: 3.15 MILL/MM3 (ref 4.7–6.1)
SODIUM SERPL-SCNC: 141 MEQ/L (ref 135–145)
WBC # BLD AUTO: 4.6 THOU/MM3 (ref 4.8–10.8)

## 2024-08-06 PROCEDURE — 90935 HEMODIALYSIS ONE EVALUATION: CPT | Performed by: INTERNAL MEDICINE

## 2024-08-06 PROCEDURE — 36415 COLL VENOUS BLD VENIPUNCTURE: CPT

## 2024-08-06 PROCEDURE — G0378 HOSPITAL OBSERVATION PER HR: HCPCS

## 2024-08-06 PROCEDURE — 2580000003 HC RX 258: Performed by: STUDENT IN AN ORGANIZED HEALTH CARE EDUCATION/TRAINING PROGRAM

## 2024-08-06 PROCEDURE — 6370000000 HC RX 637 (ALT 250 FOR IP): Performed by: STUDENT IN AN ORGANIZED HEALTH CARE EDUCATION/TRAINING PROGRAM

## 2024-08-06 PROCEDURE — 85027 COMPLETE CBC AUTOMATED: CPT

## 2024-08-06 PROCEDURE — 90935 HEMODIALYSIS ONE EVALUATION: CPT

## 2024-08-06 PROCEDURE — 6360000002 HC RX W HCPCS: Performed by: STUDENT IN AN ORGANIZED HEALTH CARE EDUCATION/TRAINING PROGRAM

## 2024-08-06 PROCEDURE — 80048 BASIC METABOLIC PNL TOTAL CA: CPT

## 2024-08-06 PROCEDURE — 99233 SBSQ HOSP IP/OBS HIGH 50: CPT | Performed by: INTERNAL MEDICINE

## 2024-08-06 RX ORDER — ISOSORBIDE MONONITRATE 120 MG/1
120 TABLET, EXTENDED RELEASE ORAL 2 TIMES DAILY
Qty: 60 TABLET | Refills: 0 | Status: SHIPPED | OUTPATIENT
Start: 2024-08-06 | End: 2024-09-05

## 2024-08-06 RX ORDER — DOXAZOSIN MESYLATE 4 MG/1
4 TABLET ORAL EVERY 12 HOURS SCHEDULED
Qty: 60 TABLET | Refills: 0 | Status: SHIPPED | OUTPATIENT
Start: 2024-08-06 | End: 2024-09-05

## 2024-08-06 RX ORDER — AMLODIPINE BESYLATE 10 MG/1
10 TABLET ORAL DAILY
Qty: 30 TABLET | Refills: 0 | Status: SHIPPED | OUTPATIENT
Start: 2024-08-06 | End: 2024-09-05

## 2024-08-06 RX ORDER — HYDRALAZINE HYDROCHLORIDE 100 MG/1
100 TABLET, FILM COATED ORAL EVERY 8 HOURS
Qty: 90 TABLET | Refills: 0 | Status: SHIPPED | OUTPATIENT
Start: 2024-08-06 | End: 2024-09-05

## 2024-08-06 RX ORDER — CLONIDINE HYDROCHLORIDE 0.3 MG/1
0.3 TABLET ORAL 3 TIMES DAILY
Qty: 60 TABLET | Refills: 0 | Status: SHIPPED | OUTPATIENT
Start: 2024-08-06

## 2024-08-06 RX ORDER — ASPIRIN 81 MG/1
81 TABLET ORAL DAILY
Qty: 30 TABLET | Refills: 0 | Status: SHIPPED | OUTPATIENT
Start: 2024-08-06

## 2024-08-06 RX ADMIN — HYDRALAZINE HYDROCHLORIDE 100 MG: 50 TABLET ORAL at 12:34

## 2024-08-06 RX ADMIN — AMLODIPINE BESYLATE 10 MG: 10 TABLET ORAL at 12:33

## 2024-08-06 RX ADMIN — SODIUM CHLORIDE, PRESERVATIVE FREE 10 ML: 5 INJECTION INTRAVENOUS at 12:34

## 2024-08-06 RX ADMIN — HEPARIN SODIUM 5000 UNITS: 5000 INJECTION INTRAVENOUS; SUBCUTANEOUS at 06:06

## 2024-08-06 RX ADMIN — SERTRALINE 150 MG: 50 TABLET, FILM COATED ORAL at 12:34

## 2024-08-06 RX ADMIN — ASPIRIN 81 MG: 81 TABLET, COATED ORAL at 12:33

## 2024-08-06 RX ADMIN — ATORVASTATIN CALCIUM 40 MG: 40 TABLET, FILM COATED ORAL at 12:33

## 2024-08-06 RX ADMIN — DOXAZOSIN 4 MG: 4 TABLET ORAL at 12:34

## 2024-08-06 RX ADMIN — ACETAMINOPHEN 650 MG: 325 TABLET ORAL at 12:33

## 2024-08-06 RX ADMIN — CLONIDINE HYDROCHLORIDE 0.3 MG: 0.2 TABLET ORAL at 12:34

## 2024-08-06 ASSESSMENT — PAIN SCALES - GENERAL: PAINLEVEL_OUTOF10: 3

## 2024-08-06 ASSESSMENT — PAIN DESCRIPTION - LOCATION: LOCATION: NECK

## 2024-08-06 ASSESSMENT — PAIN DESCRIPTION - DESCRIPTORS: DESCRIPTORS: CRAMPING

## 2024-08-06 NOTE — DISCHARGE INSTRUCTIONS
Follow-up with primary care provider  Follow-up with nephrology and hemodialysis as scheduled.  Repeat blood work in 3 days.  Reach out to [place] is considering addiction services.

## 2024-08-06 NOTE — FLOWSHEET NOTE
08/06/24 0744 08/06/24 1218   Vital Signs   Temp 99.3 °F (37.4 °C) 98.7 °F (37.1 °C)   Pulse 84 81   Respirations 14 18   SpO2 100 % 98 %   Weight - Scale 94.7 kg (208 lb 12.4 oz) 94 kg (207 lb 3.7 oz)   Weight Method Standing scale Standing scale   Percent Weight Change -0.32 -0.74   Post-Hemodialysis Assessment   Post-Treatment Procedures  --  Blood returned;Access bleeding time < 10 minutes   Machine Disinfection Process  --  Acid/Vinegar Clean;Heat Disinfect;Exterior Machine Disinfection   Blood Volume Processed (Liters)  --  115.7 L   Dialyzer Clearance  --  Lightly streaked   Hemodialysis Intake (ml)  --  400 ml   Hemodialysis Output (ml)  --  1050 ml   NET Removed (ml)  --  650   Tolerated Treatment  --  Good     Stable 4 hour treatment completed. Removed 650ml of fluid.  Tolerated fluid removal well. Pressure held to each needle site x 10 min. Drsg clean, dry, and intact upon leaving unit. Report called to primary RN. TX record printed for scanning into EMR.

## 2024-08-06 NOTE — CARE COORDINATION
8/6/24, 12:05 PM EDT    Discharge to home with spouse. Script for walker provided per Trav's request (does not have the funds to purchase now). Resume TTS HD at [facility]. Has home oxygen from [facility].    Patient goals/plan/ treatment preferences discussed by  and .  Patient goals/plan/ treatment preferences reviewed with patient/ family.  Patient/ family verbalize understanding of discharge plan and are in agreement with goal/plan/treatment preferences.  Understanding was demonstrated using the teach back method.  AVS provided by RN at time of discharge, which includes all necessary medical information pertaining to the patients current course of illness, treatment, post-discharge goals of care, and treatment preferences.     Services At/After Discharge: DME

## 2024-08-06 NOTE — DISCHARGE SUMMARY
proBNP was 65,702, troponin was 101.    Hospital course by problem:    Chest pain secondary to crack cocaine use, coronary vasospasm, resolved  Presented to [facility] ED with chest pain secondary to crack cocaine, chest pain began prior to hospitalization.  Patient is ESRD on HD missed his dialysis on 8/3 => troponin elevation that is chronic, elevated proBNP likely due to fluid overload due to missed dialysis.  EKG shows NSR/incomplete RBBB.  Chest pain improved on 8/5 with blood pressure control and dialysis.  Nephrology following.  Continue hypertensive management please see #3,   Chest pain was resolved with HTN management and dialysis.  Patient was asked to abstain from cocaine, follow-up with addiction services as outpatient.  SOB, suspect secondary to prior cocaine use and fluid overload, resolved back to baseline  Likely secondary to fluid overload missed dialysis on 8/3 => presents to [facility] [place] [address], chest x-ray shows cardiomegaly/pulmonary edema  Echo and 2023 shows LVEF 65 to 70% => LA moderately dilated => marked LVOT obstruction.  Still saturating 96% on 3 L.  S/p 2 sessions of HD, follow-up with nephrology for scheduled hemodialysis on TTS  HTN urgency, presented to [facility] with elevated blood pressures into the systolics 200s.  Patient does state that he abuse crack cocaine, UDS was not collected on admission.  Continue Norvasc, clonidine, Cardura, hydralazine  Abstain from cocaine  Have patient follow-up with PCP  ESRD on dialysis, follows [facility] nephrology, on HD Tuesday, Thursday, Saturday fistula on left arm.  Received HD on 8/5 and 8/6  Follow-up with nephrology with repeat BMP and CBC.  Chronic troponin elevation likely multifactorial => initially presented to [facility] ED with complaints of chest pain see #1 has chronic troponin elevation, ESRD on HD did not receive dialysis on 8/3  Anemia secondary to ESRD  Baseline hemoglobin appears to be 10 => Hgb on admission 12.2 repeat was 10.1 no overt  [facility] - [place], [place] - [address] - P [phone] - F [phone]  [address], [place] [address]      Phone: [phone]   amLODIPine 10 MG tablet  aspirin 81 MG EC tablet  cloNIDine 0.3 MG tablet  doxazosin 4 MG tablet  hydrALAZINE 100 MG tablet  isosorbide mononitrate 120 MG extended release tablet  sertraline 50 MG tablet         Time Spent on discharge is more than 30 minutes in the examination, evaluation, counseling and review of medications and discharge plan.      Signed:    Thank you Radha Hou APRN - MEL for the opportunity to be involved in this patient's care.    Electronically signed by Pat Carney MD on 8/6/2024 at 7:24 PM

## 2024-08-06 NOTE — PROGRESS NOTES
[facility]   Nephrology progress note  8/6/2024, 9:33 AM      Pt Name:    Trav Jennings  MRN:     [id number]     YOB: 1965  Admit Date:    8/4/2024  5:03 AM    Chief Complaint: Nephrology following for ESRD on hemodialysis.    Subjective:  Patient seen and examined  No chest pain or shortness of breath  Feels okay, no complaints    Objective:  24HR INTAKE/OUTPUT:    Intake/Output Summary (Last 24 hours) at 8/6/2024 0933  Last data filed at 8/6/2024 0433  Gross per 24 hour   Intake 640 ml   Output 3900 ml   Net -3260 ml        Admission weight: 97 kg (213 lb 13.5 oz)  Wt Readings from Last 3 Encounters:   08/06/24 94.7 kg (208 lb 12.4 oz)   06/05/24 94 kg (207 lb 3.7 oz)   04/08/24 91.3 kg (201 lb 4.5 oz)        Vitals :   Vitals:    08/05/24 2315 08/06/24 0416 08/06/24 0419 08/06/24 0744   BP: 133/66 138/69  (!) 145/72   Pulse: 80 84  84   Resp: 16 16  14   Temp: 98.2 °F (36.8 °C) 98.1 °F (36.7 °C)  99.3 °F (37.4 °C)   TempSrc: Oral Oral     SpO2: 97% 100%  100%   Weight:   95 kg (209 lb 7 oz) 94.7 kg (208 lb 12.4 oz)   Height:           Physical examination  General Appearance:  Well developed. No distress  Mouth/Throat:  Oral mucosa moist  Neck:  Supple, no JVD  Lungs:  Breath sounds: Faint crackles  Heart::  S1,S2 heard  Abdomen:  Soft, non - tender  Musculoskeletal:  Edema - minimal edema noted    Medications:  Infusion:    sodium chloride      [Held by provider] nitroGLYCERIN       Meds:    sertraline  150 mg Oral Daily    hydrALAZINE  100 mg Oral q8h    doxazosin  4 mg Oral 2 times per day    cloNIDine  0.3 mg Oral TID    cinacalcet  90 mg Oral Once per day on Mon Wed Fri    atorvastatin  40 mg Oral Daily    aspirin  81 mg Oral Daily    amLODIPine  10 mg Oral Daily    sodium chloride flush  5-40 mL IntraVENous 2 times per day    heparin (porcine)  5,000 Units SubCUTAneous 3 times per day       Lab Data :  CBC:   Recent Labs     08/04/24  0510 08/05/24  0550 08/06/24  0637

## 2024-08-06 NOTE — PLAN OF CARE
Problem: Discharge Planning  Goal: Discharge to home or other facility with appropriate resources  Outcome: Progressing  Flowsheets (Taken 8/6/2024 0416)  Discharge to home or other facility with appropriate resources:   Identify barriers to discharge with patient and caregiver   Arrange for needed discharge resources and transportation as appropriate   Identify discharge learning needs (meds, wound care, etc)     Problem: Pain  Goal: Verbalizes/displays adequate comfort level or baseline comfort level  Outcome: Progressing  Flowsheets  Taken 8/6/2024 0416 by Nina Cowan RN  Verbalizes/displays adequate comfort level or baseline comfort level:   Encourage patient to monitor pain and request assistance   Assess pain using appropriate pain scale   Administer analgesics based on type and severity of pain and evaluate response  Taken 8/5/2024 1545 by Jyoti Pedroza RN  Verbalizes/displays adequate comfort level or baseline comfort level: Encourage patient to monitor pain and request assistance     Problem: Chronic Conditions and Co-morbidities  Goal: Patient's chronic conditions and co-morbidity symptoms are monitored and maintained or improved  Outcome: Progressing  Flowsheets (Taken 8/6/2024 0416)  Care Plan - Patient's Chronic Conditions and Co-Morbidity Symptoms are Monitored and Maintained or Improved:   Monitor and assess patient's chronic conditions and comorbid symptoms for stability, deterioration, or improvement   Collaborate with multidisciplinary team to address chronic and comorbid conditions and prevent exacerbation or deterioration

## 2024-08-06 NOTE — PROGRESS NOTES
Discharge teaching and instructions for diagnosis/procedure of Dyspnea completed with patient using teachback method. AVS reviewed. Printed prescriptions given to patient. Patient voiced understanding regarding prescriptions, follow up appointments, and care of self at home. Discharged in a wheelchair to  home with support per  Cab.

## 2024-08-06 NOTE — PLAN OF CARE
Problem: Discharge Planning  Goal: Discharge to home or other facility with appropriate resources  Outcome: Progressing  Discharge to home or other facility with appropriate resources:   Identify barriers to discharge with patient and caregiver   Arrange for needed discharge resources and transportation as appropriate   Identify discharge learning needs (meds, wound care, etc)  Note: Go over discharge plan with patient. Pt verbalizes understanding of the plan     Problem: Pain  Goal: Verbalizes/displays adequate comfort level or baseline comfort level  Outcome: Progressing  Verbalizes/displays adequate comfort level or baseline comfort level:   Encourage patient to monitor pain and request assistance   Assess pain using appropriate pain scale   Administer analgesics based on type and severity of pain and evaluate response  Note: Pts pain remains at a 3 or lower. VSS.      Problem: Chronic Conditions and Co-morbidities  Goal: Patient's chronic conditions and co-morbidity symptoms are monitored and maintained or improved  Outcome: Progressing  Note: Pt chronic conditions remain at baseline during the stay. VSS.   Outcome: Progressing  Care Plan - Patient's Chronic Conditions and Co-Morbidity Symptoms are Monitored and Maintained or Improved:   Monitor and assess patient's chronic conditions and comorbid symptoms for stability, deterioration, or improvement   Collaborate with multidisciplinary team to address chronic and comorbid conditions and prevent exacerbation or deterioration     Care plan reviewed with patient. Patient verbalizes understanding of plan of care and contributes to goal setting.

## 2024-09-29 ENCOUNTER — APPOINTMENT (OUTPATIENT)
Dept: GENERAL RADIOLOGY | Age: 59
End: 2024-09-29
Payer: MEDICARE

## 2024-09-29 ENCOUNTER — HOSPITAL ENCOUNTER (OUTPATIENT)
Age: 59
Setting detail: OBSERVATION
Discharge: HOME | End: 2024-09-30
Attending: EMERGENCY MEDICINE
Payer: MEDICARE

## 2024-09-29 DIAGNOSIS — I10 HYPERTENSION, UNSPECIFIED TYPE: ICD-10-CM

## 2024-09-29 DIAGNOSIS — N18.6 ESRD (END STAGE RENAL DISEASE) ON DIALYSIS (HCC): Primary | ICD-10-CM

## 2024-09-29 DIAGNOSIS — Z99.2 ESRD (END STAGE RENAL DISEASE) ON DIALYSIS (HCC): Primary | ICD-10-CM

## 2024-09-29 LAB
ALBUMIN SERPL BCG-MCNC: 4 G/DL (ref 3.5–5.1)
ALP SERPL-CCNC: 95 U/L (ref 38–126)
ALT SERPL W/O P-5'-P-CCNC: 10 U/L (ref 11–66)
ANION GAP SERPL CALC-SCNC: 14 MEQ/L (ref 8–16)
ANION GAP SERPL CALC-SCNC: 19 MEQ/L (ref 8–16)
AST SERPL-CCNC: 17 U/L (ref 5–40)
BASOPHILS ABSOLUTE: 0 THOU/MM3 (ref 0–0.1)
BASOPHILS NFR BLD AUTO: 0.5 %
BILIRUB SERPL-MCNC: 0.3 MG/DL (ref 0.3–1.2)
BUN SERPL-MCNC: 59 MG/DL (ref 7–22)
BUN SERPL-MCNC: 97 MG/DL (ref 7–22)
CALCIUM SERPL-MCNC: 8.5 MG/DL (ref 8.5–10.5)
CALCIUM SERPL-MCNC: 8.6 MG/DL (ref 8.5–10.5)
CHLORIDE SERPL-SCNC: 96 MEQ/L (ref 98–111)
CHLORIDE SERPL-SCNC: 97 MEQ/L (ref 98–111)
CO2 SERPL-SCNC: 22 MEQ/L (ref 23–33)
CO2 SERPL-SCNC: 26 MEQ/L (ref 23–33)
CREAT SERPL-MCNC: 12.1 MG/DL (ref 0.4–1.2)
CREAT SERPL-MCNC: 8.3 MG/DL (ref 0.4–1.2)
DEPRECATED RDW RBC AUTO: 54.4 FL (ref 35–45)
EOSINOPHIL NFR BLD AUTO: 2.2 %
EOSINOPHILS ABSOLUTE: 0.1 THOU/MM3 (ref 0–0.4)
ERYTHROCYTE [DISTWIDTH] IN BLOOD BY AUTOMATED COUNT: 14.1 % (ref 11.5–14.5)
ETHANOL SERPL-MCNC: < 0.01 % (ref 0–0.08)
GFR SERPL CREATININE-BSD FRML MDRD: 4 ML/MIN/1.73M2
GFR SERPL CREATININE-BSD FRML MDRD: 7 ML/MIN/1.73M2
GLUCOSE BLD STRIP.AUTO-MCNC: 69 MG/DL (ref 70–108)
GLUCOSE SERPL-MCNC: 107 MG/DL (ref 70–108)
GLUCOSE SERPL-MCNC: 79 MG/DL (ref 70–108)
HCT VFR BLD AUTO: 38.2 % (ref 42–52)
HGB BLD-MCNC: 11.7 GM/DL (ref 14–18)
IMM GRANULOCYTES # BLD AUTO: 0.02 THOU/MM3 (ref 0–0.07)
IMM GRANULOCYTES NFR BLD AUTO: 0.4 %
LACTIC ACID, SEPSIS: 1.1 MMOL/L (ref 0.5–1.9)
LIPASE SERPL-CCNC: 56.5 U/L (ref 5.6–51.3)
LYMPHOCYTES ABSOLUTE: 0.8 THOU/MM3 (ref 1–4.8)
LYMPHOCYTES NFR BLD AUTO: 13.7 %
MAGNESIUM SERPL-MCNC: 2.7 MG/DL (ref 1.6–2.4)
MCH RBC QN AUTO: 32.2 PG (ref 26–33)
MCHC RBC AUTO-ENTMCNC: 30.6 GM/DL (ref 32.2–35.5)
MCV RBC AUTO: 105.2 FL (ref 80–94)
MONOCYTES ABSOLUTE: 0.4 THOU/MM3 (ref 0.4–1.3)
MONOCYTES NFR BLD AUTO: 7.7 %
NEUTROPHILS ABSOLUTE: 4.2 THOU/MM3 (ref 1.8–7.7)
NEUTROPHILS NFR BLD AUTO: 75.5 %
NRBC BLD AUTO-RTO: 0 /100 WBC
NT-PROBNP SERPL IA-MCNC: ABNORMAL PG/ML (ref 0–124)
OSMOLALITY SERPL CALC.SUM OF ELEC: 290.8 MOSMOL/KG (ref 275–300)
OSMOLALITY SERPL CALC.SUM OF ELEC: 302.9 MOSMOL/KG (ref 275–300)
PH BLDV: 7.4 [PH] (ref 7.31–7.41)
PLATELET # BLD AUTO: 118 THOU/MM3 (ref 130–400)
PMV BLD AUTO: 9.5 FL (ref 9.4–12.4)
POTASSIUM SERPL-SCNC: 4.7 MEQ/L (ref 3.5–5.2)
POTASSIUM SERPL-SCNC: 5.8 MEQ/L (ref 3.5–5.2)
PROT SERPL-MCNC: 7.7 G/DL (ref 6.1–8)
RBC # BLD AUTO: 3.63 MILL/MM3 (ref 4.7–6.1)
SODIUM SERPL-SCNC: 137 MEQ/L (ref 135–145)
SODIUM SERPL-SCNC: 137 MEQ/L (ref 135–145)
TROPONIN, HIGH SENSITIVITY: 100 NG/L (ref 0–12)
TROPONIN, HIGH SENSITIVITY: 98 NG/L (ref 0–12)
WBC # BLD AUTO: 5.5 THOU/MM3 (ref 4.8–10.8)

## 2024-09-29 PROCEDURE — 96374 THER/PROPH/DIAG INJ IV PUSH: CPT

## 2024-09-29 PROCEDURE — 99222 1ST HOSP IP/OBS MODERATE 55: CPT | Performed by: INTERNAL MEDICINE

## 2024-09-29 PROCEDURE — G0378 HOSPITAL OBSERVATION PER HR: HCPCS

## 2024-09-29 PROCEDURE — 96375 TX/PRO/DX INJ NEW DRUG ADDON: CPT

## 2024-09-29 PROCEDURE — 83735 ASSAY OF MAGNESIUM: CPT

## 2024-09-29 PROCEDURE — 36415 COLL VENOUS BLD VENIPUNCTURE: CPT

## 2024-09-29 PROCEDURE — 80053 COMPREHEN METABOLIC PANEL: CPT

## 2024-09-29 PROCEDURE — 2500000003 HC RX 250 WO HCPCS

## 2024-09-29 PROCEDURE — 94640 AIRWAY INHALATION TREATMENT: CPT

## 2024-09-29 PROCEDURE — 96376 TX/PRO/DX INJ SAME DRUG ADON: CPT

## 2024-09-29 PROCEDURE — 90935 HEMODIALYSIS ONE EVALUATION: CPT

## 2024-09-29 PROCEDURE — 85025 COMPLETE CBC W/AUTO DIFF WBC: CPT

## 2024-09-29 PROCEDURE — 94761 N-INVAS EAR/PLS OXIMETRY MLT: CPT

## 2024-09-29 PROCEDURE — 83880 ASSAY OF NATRIURETIC PEPTIDE: CPT

## 2024-09-29 PROCEDURE — 6370000000 HC RX 637 (ALT 250 FOR IP)

## 2024-09-29 PROCEDURE — 99285 EMERGENCY DEPT VISIT HI MDM: CPT

## 2024-09-29 PROCEDURE — 90935 HEMODIALYSIS ONE EVALUATION: CPT | Performed by: INTERNAL MEDICINE

## 2024-09-29 PROCEDURE — 2700000000 HC OXYGEN THERAPY PER DAY

## 2024-09-29 PROCEDURE — 6360000002 HC RX W HCPCS

## 2024-09-29 PROCEDURE — 83690 ASSAY OF LIPASE: CPT

## 2024-09-29 PROCEDURE — 82077 ASSAY SPEC XCP UR&BREATH IA: CPT

## 2024-09-29 PROCEDURE — 71045 X-RAY EXAM CHEST 1 VIEW: CPT

## 2024-09-29 PROCEDURE — 82800 BLOOD PH: CPT

## 2024-09-29 PROCEDURE — 2580000003 HC RX 258

## 2024-09-29 PROCEDURE — 99223 1ST HOSP IP/OBS HIGH 75: CPT

## 2024-09-29 PROCEDURE — 93005 ELECTROCARDIOGRAM TRACING: CPT

## 2024-09-29 PROCEDURE — 84484 ASSAY OF TROPONIN QUANT: CPT

## 2024-09-29 PROCEDURE — 96365 THER/PROPH/DIAG IV INF INIT: CPT

## 2024-09-29 PROCEDURE — 83605 ASSAY OF LACTIC ACID: CPT

## 2024-09-29 PROCEDURE — 96366 THER/PROPH/DIAG IV INF ADDON: CPT

## 2024-09-29 PROCEDURE — 82948 REAGENT STRIP/BLOOD GLUCOSE: CPT

## 2024-09-29 RX ORDER — ONDANSETRON 4 MG/1
4 TABLET, ORALLY DISINTEGRATING ORAL EVERY 8 HOURS PRN
Status: DISCONTINUED | OUTPATIENT
Start: 2024-09-29 | End: 2024-09-30 | Stop reason: HOSPADM

## 2024-09-29 RX ORDER — HYDRALAZINE HYDROCHLORIDE 50 MG/1
100 TABLET, FILM COATED ORAL EVERY 8 HOURS
Status: DISCONTINUED | OUTPATIENT
Start: 2024-09-29 | End: 2024-09-30 | Stop reason: HOSPADM

## 2024-09-29 RX ORDER — AMLODIPINE BESYLATE 10 MG/1
10 TABLET ORAL DAILY
Status: DISCONTINUED | OUTPATIENT
Start: 2024-09-29 | End: 2024-09-30 | Stop reason: HOSPADM

## 2024-09-29 RX ORDER — ALBUTEROL SULFATE 90 UG/1
2 INHALANT RESPIRATORY (INHALATION) EVERY 6 HOURS PRN
Status: DISCONTINUED | OUTPATIENT
Start: 2024-09-29 | End: 2024-09-30 | Stop reason: HOSPADM

## 2024-09-29 RX ORDER — CINACALCET 30 MG/1
90 TABLET, FILM COATED ORAL
Status: DISCONTINUED | OUTPATIENT
Start: 2024-09-30 | End: 2024-09-30 | Stop reason: HOSPADM

## 2024-09-29 RX ORDER — METOPROLOL TARTRATE 1 MG/ML
5 INJECTION, SOLUTION INTRAVENOUS ONCE
Status: DISCONTINUED | OUTPATIENT
Start: 2024-09-29 | End: 2024-09-29

## 2024-09-29 RX ORDER — SODIUM CHLORIDE 0.9 % (FLUSH) 0.9 %
5-40 SYRINGE (ML) INJECTION PRN
Status: DISCONTINUED | OUTPATIENT
Start: 2024-09-29 | End: 2024-09-30 | Stop reason: HOSPADM

## 2024-09-29 RX ORDER — ATORVASTATIN CALCIUM 40 MG/1
40 TABLET, FILM COATED ORAL DAILY
Status: DISCONTINUED | OUTPATIENT
Start: 2024-09-30 | End: 2024-09-30 | Stop reason: HOSPADM

## 2024-09-29 RX ORDER — HEPARIN SODIUM 5000 [USP'U]/ML
5000 INJECTION, SOLUTION INTRAVENOUS; SUBCUTANEOUS EVERY 8 HOURS SCHEDULED
Status: DISCONTINUED | OUTPATIENT
Start: 2024-09-30 | End: 2024-09-30 | Stop reason: HOSPADM

## 2024-09-29 RX ORDER — ONDANSETRON 2 MG/ML
4 INJECTION INTRAMUSCULAR; INTRAVENOUS EVERY 6 HOURS PRN
Status: DISCONTINUED | OUTPATIENT
Start: 2024-09-29 | End: 2024-09-30 | Stop reason: HOSPADM

## 2024-09-29 RX ORDER — ALBUTEROL SULFATE 0.83 MG/ML
10 SOLUTION RESPIRATORY (INHALATION) ONCE
Status: COMPLETED | OUTPATIENT
Start: 2024-09-29 | End: 2024-09-29

## 2024-09-29 RX ORDER — SODIUM CHLORIDE 9 MG/ML
INJECTION, SOLUTION INTRAVENOUS PRN
Status: DISCONTINUED | OUTPATIENT
Start: 2024-09-29 | End: 2024-09-30 | Stop reason: HOSPADM

## 2024-09-29 RX ORDER — ISOSORBIDE MONONITRATE 60 MG/1
120 TABLET, EXTENDED RELEASE ORAL 2 TIMES DAILY
Status: DISCONTINUED | OUTPATIENT
Start: 2024-09-29 | End: 2024-09-30 | Stop reason: HOSPADM

## 2024-09-29 RX ORDER — METOPROLOL TARTRATE 1 MG/ML
5 INJECTION, SOLUTION INTRAVENOUS EVERY 5 MIN PRN
Status: DISCONTINUED | OUTPATIENT
Start: 2024-09-29 | End: 2024-09-29

## 2024-09-29 RX ORDER — ACETAMINOPHEN 650 MG/1
650 SUPPOSITORY RECTAL EVERY 6 HOURS PRN
Status: DISCONTINUED | OUTPATIENT
Start: 2024-09-29 | End: 2024-09-30 | Stop reason: HOSPADM

## 2024-09-29 RX ORDER — SODIUM CHLORIDE 0.9 % (FLUSH) 0.9 %
5-40 SYRINGE (ML) INJECTION EVERY 12 HOURS SCHEDULED
Status: DISCONTINUED | OUTPATIENT
Start: 2024-09-29 | End: 2024-09-30 | Stop reason: HOSPADM

## 2024-09-29 RX ORDER — ENOXAPARIN SODIUM 100 MG/ML
40 INJECTION SUBCUTANEOUS DAILY
Status: DISCONTINUED | OUTPATIENT
Start: 2024-09-29 | End: 2024-09-29 | Stop reason: ALTCHOICE

## 2024-09-29 RX ORDER — CALCIUM GLUCONATE 10 MG/ML
1000 INJECTION, SOLUTION INTRAVENOUS ONCE
Status: COMPLETED | OUTPATIENT
Start: 2024-09-29 | End: 2024-09-29

## 2024-09-29 RX ORDER — DOXAZOSIN 4 MG/1
4 TABLET ORAL EVERY 12 HOURS SCHEDULED
Status: DISCONTINUED | OUTPATIENT
Start: 2024-09-29 | End: 2024-09-30 | Stop reason: HOSPADM

## 2024-09-29 RX ORDER — CALCITRIOL 0.25 UG/1
0.5 CAPSULE, LIQUID FILLED ORAL
Status: DISCONTINUED | OUTPATIENT
Start: 2024-09-30 | End: 2024-09-30 | Stop reason: HOSPADM

## 2024-09-29 RX ORDER — TRAZODONE HYDROCHLORIDE 100 MG/1
100 TABLET ORAL NIGHTLY PRN
Status: DISCONTINUED | OUTPATIENT
Start: 2024-09-29 | End: 2024-09-30 | Stop reason: HOSPADM

## 2024-09-29 RX ORDER — ASPIRIN 81 MG/1
81 TABLET ORAL DAILY
Status: DISCONTINUED | OUTPATIENT
Start: 2024-09-30 | End: 2024-09-30 | Stop reason: HOSPADM

## 2024-09-29 RX ORDER — ACETAMINOPHEN 325 MG/1
650 TABLET ORAL EVERY 6 HOURS PRN
Status: DISCONTINUED | OUTPATIENT
Start: 2024-09-29 | End: 2024-09-30 | Stop reason: HOSPADM

## 2024-09-29 RX ADMIN — METOPROLOL TARTRATE 5 MG: 5 INJECTION INTRAVENOUS at 21:02

## 2024-09-29 RX ADMIN — ISOSORBIDE MONONITRATE 120 MG: 60 TABLET, EXTENDED RELEASE ORAL at 21:21

## 2024-09-29 RX ADMIN — CALCIUM GLUCONATE 1000 MG: 10 INJECTION, SOLUTION INTRAVENOUS at 18:22

## 2024-09-29 RX ADMIN — METOPROLOL TARTRATE 5 MG: 5 INJECTION INTRAVENOUS at 18:45

## 2024-09-29 RX ADMIN — DOXAZOSIN 4 MG: 4 TABLET ORAL at 21:19

## 2024-09-29 RX ADMIN — AMLODIPINE BESYLATE 10 MG: 10 TABLET ORAL at 21:19

## 2024-09-29 RX ADMIN — ACETAMINOPHEN 650 MG: 325 TABLET ORAL at 21:02

## 2024-09-29 RX ADMIN — CLONIDINE HYDROCHLORIDE 0.3 MG: 0.2 TABLET ORAL at 21:19

## 2024-09-29 RX ADMIN — SODIUM CHLORIDE, PRESERVATIVE FREE 10 ML: 5 INJECTION INTRAVENOUS at 22:50

## 2024-09-29 RX ADMIN — ALBUTEROL SULFATE 10 MG: 2.5 SOLUTION RESPIRATORY (INHALATION) at 18:29

## 2024-09-29 ASSESSMENT — PAIN DESCRIPTION - LOCATION
LOCATION: CHEST
LOCATION: HEAD

## 2024-09-29 ASSESSMENT — PAIN SCALES - GENERAL: PAINLEVEL_OUTOF10: 4

## 2024-09-29 NOTE — CONSULTS
traZODone (DESYREL) 100 MG tablet Take 1 tablet by mouth nightly as needed for Sleep 4/16/24   Ian Holm MD   cinacalcet (SENSIPAR) 90 MG tablet Take 1 tablet by mouth three times a week 4/17/24 5/17/24  Umesh Chou DO   calcitRIOL (ROCALTROL) 0.5 MCG capsule Take 1 capsule by mouth three times a week 4/15/24 5/15/24  Umesh Chou DO   atorvastatin (LIPITOR) 80 MG tablet Take 0.5 tablets by mouth daily 4/15/24 5/15/24  Umesh Chou DO   albuterol sulfate HFA (VENTOLIN HFA) 108 (90 Base) MCG/ACT inhaler Inhale 2 puffs into the lungs every 6 hours as needed for Wheezing    ProviderJaime MD       Review of Systems:  Constitutional: Positive for generalized weakness, fatigue, shortness of breath and cough  Head: Negative for headaches  Eyes: Negative for blurry vision or discharge  Ears: Negative for ear pain or hearing changes  Nose: Negative for runny nose or epistaxis  Respiratory: Positive for shortness of breath and cough.  Negative for hemoptysis  Cardiovascular: Negative for chest pain  GI: Negative for nausea, vomiting and diarrhea.  Negative for hematochezia and melena    All other review of systems were reviewed and negative    Current Meds:  Infusion:   Meds:   Meds prn: metoprolol     Allergies/Intolerances:  ALLERGIES: Humalog [insulin lispro], Insulin regular human, and Lisinopril    24HR INTAKE/OUTPUT:  No intake or output data in the 24 hours ending 09/29/24 1924  No intake/output data recorded.  No intake/output data recorded.  Admission weight:    Wt Readings from Last 3 Encounters:   08/06/24 94 kg (207 lb 3.7 oz)   06/05/24 94 kg (207 lb 3.7 oz)   04/16/24 94.6 kg (208 lb 8 oz)     There is no height or weight on file to calculate BMI.    Physical Examination:  VITALS:   Vitals:    09/29/24 1834 09/29/24 1839 09/29/24 1841 09/29/24 1851   BP:    (!) 199/87   Pulse: 93 92 92 85   Resp: 18 18 16 20   Temp:       TempSrc:       SpO2:    100%     Weight:   Wt Readings from Last

## 2024-09-29 NOTE — H&P
Hospitalist History & Physical    Patient:  Trav Jennings    Unit/Bed:MAYO /MAYO  YOB: 1965  MRN: [id number]   Acct: [id number]   PCP: Radha Hou APRN - CNP  Code Status: Prior    Date of Service: Pt seen/examined on 09/29/24 and admitted to Observation with expected LOS less than two midnights due to medical therapy.     Chief Complaint: SOB    Assessment/Plan:    Acute respiratory failure with hypoxia secondary to fluid overload/pulmonary edema secondary to missed dialysis: Reports missing dialysis on Saturday, now complaining of SOB and requiring O2 and nausea. SpO2 89% on arrival, placed on 4LNC. CXR notes cardiomegaly with pulmonary vascular congestion and small pleural effusions. Will likely correct with dialysis.   Wean O2 as tolerated  Pulmonary hygiene: IS, Acapella, PRN albuterol  Respiratory culture and virus panel pending    Hypertensive urgency: /93 on arrival. Given Lopressor 5mg IV x1 in ED with mild improvement.   Continue home medications  If no improvement post dialysis, may add something PRN    Elevated troponin, chronic: Heart score 7. Initial HS troponin 98. EKG shows ST depression in lateral leads and ST elevation in anterior leads. BNP 03058. Likely secondary to missed dialysis and hypertension.   Trend troponin:  Repeat EKG in AM  Telemetry monitoring    HAGMA: AG 19, CO2 22, LA 1.1. Likely secondary to above.  VBG pending  Repeat BMP after hemodialysis and in AM    Hyperkalemia: K 5.8. Will likely correct with dialysis  BMP after dialysis this evening    ESRD on hemodialysis T,R,Sa: S/p AV fistula to LUE. Follows with Dr. Ayon as OP. Anuric.   Nephrology consult  Plan for emergent dialysis this evening and repeat tomorrow  Avoid nephrotoxic agents  Pharm to renally dose medications  Daily BMP    Essential HTN:   Continue ASA, amlodipine, clonidine, doxazosin, hydralazine, isosorbide    NIDDMII: 12/2023 A1c 5.5. Reports he is no longer diabetic since weight

## 2024-09-29 NOTE — ED NOTES
Patient presents to the Emergency Department via self. Patient presents with a complaint of SOB with hypervolemia. Patient states that he missed Saturdays dialysis. EKG obtained and IV inserted. Patient is alert and oriented x4, patient does not appear in acute distress upon triage. Patient respirations are regular and unlabored with even rise and fall of chest. Call light within reach. Bilateral edema noted at this time.

## 2024-09-29 NOTE — ED PROVIDER NOTES
multiple 'M' protein     Nicotine dependence 6/16/2014    Noncompliance     Pneumonia     Psychiatric problem     PTSD (post-traumatic stress disorder)     Pt vet from [facility]    Secondary hyperparathyroidism (of renal origin)     Sleep apnea        SURGICAL HISTORY       Past Surgical History:   Procedure Laterality Date    ABDOMINAL AORTIC ANEURYSM REPAIR      BRONCHOSCOPY N/A 10/6/2022    BRONCHOSCOPY performed by Theodore Ng MD at [facility]    DIALYSIS FISTULA CREATION Left 07/08/2016    EKG 12-LEAD  9/24/2015         HAND TENDON SURGERY Left 4/5/2019    LEFT THUMB FLEXOR TENDON REPAIR performed by Edu Cervantes MD at [facility] OR    LEG TENDON SURGERY      [facility] US GUID NDL BIOPSY RIGHT Right 3/18/2024    [facility] US GUID NDL BIOPSY RIGHT 3/18/2024 Michele Lee MD [facility]    THORACOTOMY Right 10/10/2022    Right Thoracotomy & Decortication with Partial Lung Resection performed by Ralph Becerra MD at [facility] OR    VASCULAR SURGERY Left 07/08/2016    AV Fistula    VASCULAR SURGERY Right 1990    Surgery on Achilles tendon       CURRENT MEDICATIONS       Current Discharge Medication List        CONTINUE these medications which have NOT CHANGED    Details   aspirin 81 MG EC tablet Take 1 tablet by mouth daily  Qty: 30 tablet, Refills: 0      isosorbide mononitrate (IMDUR) 120 MG extended release tablet Take 1 tablet by mouth in the morning and at bedtime  Qty: 60 tablet, Refills: 0      sertraline (ZOLOFT) 50 MG tablet Take 3 tablets by mouth daily  Qty: 30 tablet, Refills: 0      cloNIDine (CATAPRES) 0.3 MG tablet Take 1 tablet by mouth 3 times daily  Qty: 60 tablet, Refills: 0      doxazosin (CARDURA) 4 MG tablet Take 1 tablet by mouth every 12 hours  Qty: 60 tablet, Refills: 0      hydrALAZINE (APRESOLINE) 100 MG tablet Take 1 tablet by mouth every 8 (eight) hours  Qty: 90 tablet, Refills: 0      amLODIPine (NORVASC) 10 MG tablet Take 1 tablet by mouth daily  Qty: 30 tablet, Refills: 0

## 2024-09-30 VITALS
TEMPERATURE: 98.4 F | BODY MASS INDEX: 25.85 KG/M2 | OXYGEN SATURATION: 99 % | HEART RATE: 80 BPM | DIASTOLIC BLOOD PRESSURE: 84 MMHG | HEIGHT: 75 IN | RESPIRATION RATE: 18 BRPM | SYSTOLIC BLOOD PRESSURE: 169 MMHG | WEIGHT: 207.89 LBS

## 2024-09-30 LAB
ANION GAP SERPL CALC-SCNC: 13 MEQ/L (ref 8–16)
BASOPHILS ABSOLUTE: 0 THOU/MM3 (ref 0–0.1)
BASOPHILS NFR BLD AUTO: 0.5 %
BUN SERPL-MCNC: 67 MG/DL (ref 7–22)
CALCIUM SERPL-MCNC: 8.1 MG/DL (ref 8.5–10.5)
CHLORIDE SERPL-SCNC: 100 MEQ/L (ref 98–111)
CO2 SERPL-SCNC: 28 MEQ/L (ref 23–33)
CREAT SERPL-MCNC: 9.3 MG/DL (ref 0.4–1.2)
DEPRECATED RDW RBC AUTO: 55.6 FL (ref 35–45)
EKG ATRIAL RATE: 80 BPM
EKG ATRIAL RATE: 94 BPM
EKG P AXIS: 55 DEGREES
EKG P AXIS: 60 DEGREES
EKG P-R INTERVAL: 210 MS
EKG P-R INTERVAL: 212 MS
EKG Q-T INTERVAL: 386 MS
EKG Q-T INTERVAL: 436 MS
EKG QRS DURATION: 106 MS
EKG QRS DURATION: 108 MS
EKG QTC CALCULATION (BAZETT): 482 MS
EKG QTC CALCULATION (BAZETT): 502 MS
EKG R AXIS: -51 DEGREES
EKG R AXIS: -54 DEGREES
EKG T AXIS: 91 DEGREES
EKG T AXIS: 92 DEGREES
EKG VENTRICULAR RATE: 80 BPM
EKG VENTRICULAR RATE: 94 BPM
EOSINOPHIL NFR BLD AUTO: 2.3 %
EOSINOPHILS ABSOLUTE: 0.1 THOU/MM3 (ref 0–0.4)
ERYTHROCYTE [DISTWIDTH] IN BLOOD BY AUTOMATED COUNT: 14.4 % (ref 11.5–14.5)
GFR SERPL CREATININE-BSD FRML MDRD: 6 ML/MIN/1.73M2
GLUCOSE SERPL-MCNC: 114 MG/DL (ref 70–108)
HCT VFR BLD AUTO: 32.8 % (ref 42–52)
HGB BLD-MCNC: 9.9 GM/DL (ref 14–18)
IMM GRANULOCYTES # BLD AUTO: 0.01 THOU/MM3 (ref 0–0.07)
IMM GRANULOCYTES NFR BLD AUTO: 0.3 %
LYMPHOCYTES ABSOLUTE: 0.5 THOU/MM3 (ref 1–4.8)
LYMPHOCYTES NFR BLD AUTO: 13.2 %
MCH RBC QN AUTO: 31.7 PG (ref 26–33)
MCHC RBC AUTO-ENTMCNC: 30.2 GM/DL (ref 32.2–35.5)
MCV RBC AUTO: 105.1 FL (ref 80–94)
MONOCYTES ABSOLUTE: 0.5 THOU/MM3 (ref 0.4–1.3)
MONOCYTES NFR BLD AUTO: 12.1 %
NEUTROPHILS ABSOLUTE: 2.8 THOU/MM3 (ref 1.8–7.7)
NEUTROPHILS NFR BLD AUTO: 71.6 %
NRBC BLD AUTO-RTO: 0 /100 WBC
OSMOLALITY SERPL CALC.SUM OF ELEC: 301.5 MOSMOL/KG (ref 275–300)
PLATELET # BLD AUTO: 116 THOU/MM3 (ref 130–400)
PMV BLD AUTO: 10.6 FL (ref 9.4–12.4)
POTASSIUM SERPL-SCNC: 4.7 MEQ/L (ref 3.5–5.2)
POTASSIUM SERPL-SCNC: 6 MEQ/L (ref 3.5–5.2)
RBC # BLD AUTO: 3.12 MILL/MM3 (ref 4.7–6.1)
SODIUM SERPL-SCNC: 141 MEQ/L (ref 135–145)
WBC # BLD AUTO: 3.9 THOU/MM3 (ref 4.8–10.8)

## 2024-09-30 PROCEDURE — 90935 HEMODIALYSIS ONE EVALUATION: CPT

## 2024-09-30 PROCEDURE — 6360000002 HC RX W HCPCS

## 2024-09-30 PROCEDURE — 96372 THER/PROPH/DIAG INJ SC/IM: CPT

## 2024-09-30 PROCEDURE — 2580000003 HC RX 258

## 2024-09-30 PROCEDURE — 85025 COMPLETE CBC W/AUTO DIFF WBC: CPT

## 2024-09-30 PROCEDURE — 99239 HOSP IP/OBS DSCHRG MGMT >30: CPT | Performed by: PHYSICIAN ASSISTANT

## 2024-09-30 PROCEDURE — G0378 HOSPITAL OBSERVATION PER HR: HCPCS

## 2024-09-30 PROCEDURE — 93005 ELECTROCARDIOGRAM TRACING: CPT

## 2024-09-30 PROCEDURE — 36415 COLL VENOUS BLD VENIPUNCTURE: CPT

## 2024-09-30 PROCEDURE — 80048 BASIC METABOLIC PNL TOTAL CA: CPT

## 2024-09-30 PROCEDURE — 6370000000 HC RX 637 (ALT 250 FOR IP)

## 2024-09-30 PROCEDURE — 90935 HEMODIALYSIS ONE EVALUATION: CPT | Performed by: INTERNAL MEDICINE

## 2024-09-30 PROCEDURE — 93010 ELECTROCARDIOGRAM REPORT: CPT | Performed by: NUCLEAR MEDICINE

## 2024-09-30 PROCEDURE — 93010 ELECTROCARDIOGRAM REPORT: CPT | Performed by: INTERNAL MEDICINE

## 2024-09-30 PROCEDURE — 84132 ASSAY OF SERUM POTASSIUM: CPT

## 2024-09-30 RX ADMIN — SODIUM CHLORIDE, PRESERVATIVE FREE 10 ML: 5 INJECTION INTRAVENOUS at 14:15

## 2024-09-30 RX ADMIN — AMLODIPINE BESYLATE 10 MG: 10 TABLET ORAL at 14:15

## 2024-09-30 RX ADMIN — CINACALCET HYDROCHLORIDE 90 MG: 30 TABLET, FILM COATED ORAL at 14:13

## 2024-09-30 RX ADMIN — HYDRALAZINE HYDROCHLORIDE 100 MG: 50 TABLET ORAL at 14:14

## 2024-09-30 RX ADMIN — DOXAZOSIN 4 MG: 4 TABLET ORAL at 14:14

## 2024-09-30 RX ADMIN — HYDRALAZINE HYDROCHLORIDE 100 MG: 50 TABLET ORAL at 00:50

## 2024-09-30 RX ADMIN — ISOSORBIDE MONONITRATE 120 MG: 60 TABLET, EXTENDED RELEASE ORAL at 14:13

## 2024-09-30 RX ADMIN — CLONIDINE HYDROCHLORIDE 0.3 MG: 0.2 TABLET ORAL at 14:15

## 2024-09-30 RX ADMIN — ASPIRIN 81 MG: 81 TABLET, COATED ORAL at 14:13

## 2024-09-30 RX ADMIN — HYDRALAZINE HYDROCHLORIDE 100 MG: 50 TABLET ORAL at 05:07

## 2024-09-30 RX ADMIN — ATORVASTATIN CALCIUM 40 MG: 40 TABLET, FILM COATED ORAL at 00:50

## 2024-09-30 RX ADMIN — CALCITRIOL CAPSULES 0.25 MCG 0.5 MCG: 0.25 CAPSULE ORAL at 14:15

## 2024-09-30 RX ADMIN — HEPARIN SODIUM 5000 UNITS: 5000 INJECTION INTRAVENOUS; SUBCUTANEOUS at 14:16

## 2024-09-30 RX ADMIN — HEPARIN SODIUM 5000 UNITS: 5000 INJECTION INTRAVENOUS; SUBCUTANEOUS at 05:07

## 2024-09-30 RX ADMIN — SERTRALINE 150 MG: 100 TABLET, FILM COATED ORAL at 14:14

## 2024-09-30 NOTE — PROGRESS NOTES
Discharge teaching and instructions for diagnosis/procedure of acute respiratory failure with hypoxia completed with patient using teachback method. AVS reviewed. Printed prescriptions given to patient. Patient voiced understanding regarding prescriptions, follow up appointments, and care of self at home. Discharged in a wheelchair to  independent living per  [facility] cab.

## 2024-09-30 NOTE — PROGRESS NOTES
Pharmacist Review and Automatic Dose Adjustment of Prophylactic Enoxaparin or Heparin    Reviewed reason for admission/hospital problem list    The reviewing pharmacist has made an adjustment to the ordered enoxaparin or heparin dose or converted to heparin per the approved [facility] protocol and table as identified below.      Recent Labs     09/29/24  1745   CREATININE 12.1*     Estimated Creatinine Clearance: 8 mL/min (A) (based on SCr of 12.1 mg/dL (HH)).    Recent Labs     09/29/24  1745   HGB 11.7*   HCT 38.2*   *     No results for input(s): \"INR\" in the last 72 hours.    Height:   Ht Readings from Last 1 Encounters:   09/29/24 1.905 m (6' 3\")     Weight:  Wt Readings from Last 1 Encounters:   09/29/24 96.6 kg (212 lb 15.4 oz)       *Do not exceed enoxaparin 40mg daily or UFH 5000 units SUBQ TID in patients with epidurals,  lumbar drains, or external ventricular drains.    Plan:   Patient is on dialysis  Changed enoxaparin 40 mg subq daily to heparin 5,000 units subq TID.     Thank you,  Joy Spicer, PharmD, BCPS 9/29/2024 11:29 PM

## 2024-09-30 NOTE — PROGRESS NOTES
[facility]   Nephrology progress note  9/30/2024, 12:43 PM      Pt Name:    Trav Jennings  MRN:     [id number]     YOB: 1965  Admit Date:    9/29/2024  5:36 PM    Chief Complaint: Nephrology following for ESRD and HD    Subjective:  Patient was seen and examined this morning  Seen during dialysis  Tolerating hemodialysis  Increased ultrafiltration  Using 2K bath      Objective:  24HR INTAKE/OUTPUT:    Intake/Output Summary (Last 24 hours) at 9/30/2024 1243  Last data filed at 9/29/2024 2145  Gross per 24 hour   Intake 400 ml   Output 3400 ml   Net -3000 ml         I/O last 3 completed shifts:  In: 400   Out: 3400   No intake/output data recorded.   Admission weight: 96.6 kg (212 lb 15.4 oz)  Wt Readings from Last 3 Encounters:   09/30/24 96.6 kg (212 lb 15.4 oz)   08/06/24 94 kg (207 lb 3.7 oz)   06/05/24 94 kg (207 lb 3.7 oz)        Vitals :   Vitals:    09/29/24 2145 09/29/24 2236 09/30/24 0358 09/30/24 0745   BP: (!) 185/82 138/63 (!) 141/65 (!) 155/64   Pulse: 77 81 76 80   Resp: 17 18 18 16   Temp: 97.2 °F (36.2 °C) 98 °F (36.7 °C) 98.3 °F (36.8 °C) 97.6 °F (36.4 °C)   TempSrc:  Oral Oral    SpO2: 98% 93% 98% 98%   Weight:  96.6 kg (212 lb 15.4 oz)  96.6 kg (212 lb 15.4 oz)   Height:  1.905 m (6' 3\")         Physical examination  General Appearance: alert and cooperative with exam, appears comfortable, no distress  Mouth/Throat: Oral mucosa moist  Neck: No JVD  Lungs:  no use of accessory muscles  Extremities: + Bilateral LE edema    Medications:  Infusion:    sodium chloride       Meds:    sodium chloride flush  5-40 mL IntraVENous 2 times per day    amLODIPine  10 mg Oral Daily    aspirin  81 mg Oral Daily    atorvastatin  40 mg Oral Daily    calcitRIOL  0.5 mcg Oral Once per day on Monday Wednesday Friday    cinacalcet  90 mg Oral Once per day on Monday Wednesday Friday    cloNIDine  0.3 mg Oral TID    doxazosin  4 mg Oral 2 times per day    hydrALAZINE  100 mg Oral q8h

## 2024-09-30 NOTE — PROGRESS NOTES
Seen during Hd  Tolerating Hd well  Vitals noted  Using 2K bath  Access working well  UF goal 3 kg  Plan to repeat HD again tomorrow

## 2024-09-30 NOTE — FLOWSHEET NOTE
Stable 4.5 hour TX completed. Removed 2.7 L of fluid. Attempted to remove 4 L. Pt c/o muscle cramping in legs. Uf turned off with no further c/o cramping. Pressure held to each needle sites x 10 min. Drsg clean, dry, and intact upon leaving unit. TX record printed for scanning into EMR.    09/30/24 0745 09/30/24 1330   Vital Signs   BP (!) 155/64 (!) 161/78   Temp 97.6 °F (36.4 °C) 98.6 °F (37 °C)   Pulse 80 78   Respirations 16 18   SpO2 98 % 98 %   Weight - Scale 96.6 kg (212 lb 15.4 oz) 94.3 kg (207 lb 14.3 oz)   Weight Method Standing scale Bed scale   Percent Weight Change  --  -2.38   Post-Hemodialysis Assessment   Post-Treatment Procedures  --  Blood returned;Access bleeding time < 10 minutes   Machine Disinfection Process  --  Acid/Vinegar Clean;Heat Disinfect;Exterior Machine Disinfection   Blood Volume Processed (Liters)  --  119.5 L   Dialyzer Clearance  --  Lightly streaked   Duration of Treatment (minutes)  --  270 minutes   Heparin Amount Administered During Treatment (mL)  --  0 mL   Hemodialysis Intake (ml)  --  600 ml   Hemodialysis Output (ml)  --  3377 ml   NET Removed (ml)  --  2777   Tolerated Treatment  --  Good

## 2024-09-30 NOTE — CARE COORDINATION
Case Management Assessment Initial Evaluation    Date/Time of Evaluation: 2024 8:34 AM  Assessment Completed by: Kiet Perez RN    If patient is discharged prior to next notation, then this note serves as note for discharge by case management.    Patient Name: Trav Jennings                   YOB: 1965  Diagnosis: ESRD (end stage renal disease) on dialysis (HCC) [N18.6, Z99.2]  Acute respiratory failure with hypoxia [J96.01]  Hypertension, unspecified type [I10]                   Date / Time: 2024  5:36 PM  Location: [address] [place] [address]     Patient Admission Status: Observation   Readmission Risk Low 0-14, Mod 15-19), High > 20: Readmission Risk Score: 27.1    Current PCP: Radha Hou APRN - CNP  Health Care Decision Makers:     Additional Case Management Notes: K+6.0, creat 9.3, Ca+ 8.1, trops elev, BNP 25468. 98% on 4L O2. Nephrology managing dialysis.     Procedures: none    Imagin/29 cxray: Cardiomegaly with pulmonary vascular congestion and small pleural effusions.     Patient Goals/Plan/Treatment Preferences: Trav is currently off the unit, spoke with his wife who confirmed he will return home with her at discharge. TTS 0600 HD at [facility]. Has home oxygen from  DME. No further needs anticipated at this time.        24 1323   Service Assessment   Patient Orientation Alert and Oriented   Cognition Alert   History Provided By Patient   Primary Caregiver Self   Support Systems Spouse/Significant Other;Children;Family Members   Patient's Healthcare Decision Maker is: Legal Next of Kin   PCP Verified by CM Yes   Prior Functional Level Independent in ADLs/IADLs   Current Functional Level Independent in ADLs/IADLs   Can patient return to prior living arrangement Yes   Ability to make needs known: Good   Family able to assist with home care needs: Yes   Would you like for me to discuss the discharge plan with any other family members/significant others, and if so, who? No

## 2024-09-30 NOTE — PROCEDURES
PROCEDURE NOTE  Date: 9/30/2024   Name: Trav Jennings  YOB: 1965    Procedures  12 lead EKG completed. Results handed to Santos HAQUE.

## 2024-09-30 NOTE — FLOWSHEET NOTE
09/29/24 2145   Vital Signs   BP (!) 185/82   Temp 97.2 °F (36.2 °C)   Pulse 77   Respirations 17   SpO2 98 %   Post-Hemodialysis Assessment   Post-Treatment Procedures Blood returned;Access bleeding time < 10 minutes   Machine Disinfection Process Acid/Vinegar Clean;Heat Disinfect;Exterior Machine Disinfection   Rinseback Volume (ml) 400 ml   Blood Volume Processed (Liters) 57.8 L   Dialyzer Clearance Lightly streaked   Duration of Treatment (minutes) 150 minutes   Heparin Amount Administered During Treatment (mL) 0 mL   Hemodialysis Intake (ml) 400 ml   Hemodialysis Output (ml) 3400 ml   NET Removed (ml) 3000     stable 2.5 hour treatment. tylenol given for headache.  bp meds administered see mar. 3 liters net uf. pressure held both cannulation sites x 10 minutes. dressings dry and intact upon discharge from unit. report given to primary nurse. treatment record printed to be scanned into emr.

## 2024-10-01 NOTE — DISCHARGE SUMMARY
Hospital Medicine Discharge Summary      Patient Identification:   Trav Jennings   : 1965  MRN: [id number]   Account: [id number]      Patient's PCP: Radha Hou APRN - CNP    Admit Date: 2024     Discharge Date: 2024  4:54 PM    Admitting Physician: No admitting provider for patient encounter.     Discharge Physician: DEMETRICE Dodd     Discharge Diagnoses:    Active Hospital Problems    Diagnosis Date Noted    Acute respiratory failure with hypoxia [J96.01] 2023     Priority: Medium       Discharge Assessment & Plan:  Acute respiratory failure with hypoxia secondary to fluid overload/pulmonary edema secondary to missed dialysis: Reports missing dialysis on Saturday, now complaining of SOB and requiring O2 and nausea.   - Resolved. HD completed during admission.  Case discussed with nephrology, they are okay with discharge to home after dialysis.  Hypertensive urgency: /93 on arrival.  Improved with dialysis  Hypokalemia: Due to missed dialysis.  Improved with dialysis.    The patient was seen and examined on day of discharge and this discharge summary is in conjunction with any daily progress note from day of discharge.    Hospital Course:   Trav Jennings is a 59 y.o. male admitted to [facility] on 2024 for shortness of breath.        Initial H&P \"Trav Jennings is a 59 y.o. male with PMHx of ESRD on hemodialysis, medical noncompliance, HTN who [facility] with chief complaint of SOB. Patient reports missing dialysis Saturday due to a family emergency and being out of town. Reports that yesterday he became increasingly SOB and applied his own home O2, which he uses PRN when he becomes SOB. Reports that he was SOB at rest and it increased with activity. States that he felt like his lungs were full of fluid that he was unable to cough up. Reports some chest pain due to anxiety because he was unable to breath. Denies any other symptoms. Denies any

## 2024-12-04 ENCOUNTER — APPOINTMENT (OUTPATIENT)
Dept: CT IMAGING | Age: 59
DRG: 917 | End: 2024-12-04
Payer: MEDICARE

## 2024-12-04 ENCOUNTER — HOSPITAL ENCOUNTER (INPATIENT)
Age: 59
LOS: 4 days | Discharge: HOME | DRG: 917 | End: 2024-12-08
Attending: EMERGENCY MEDICINE | Admitting: STUDENT IN AN ORGANIZED HEALTH CARE EDUCATION/TRAINING PROGRAM
Payer: MEDICARE

## 2024-12-04 ENCOUNTER — APPOINTMENT (OUTPATIENT)
Dept: GENERAL RADIOLOGY | Age: 59
DRG: 917 | End: 2024-12-04
Payer: MEDICARE

## 2024-12-04 DIAGNOSIS — F32.A DEPRESSION, UNSPECIFIED DEPRESSION TYPE: ICD-10-CM

## 2024-12-04 DIAGNOSIS — R06.02 SHORTNESS OF BREATH: ICD-10-CM

## 2024-12-04 DIAGNOSIS — R07.9 CHEST PAIN, UNSPECIFIED TYPE: Primary | ICD-10-CM

## 2024-12-04 DIAGNOSIS — I10 UNCONTROLLED HYPERTENSION: ICD-10-CM

## 2024-12-04 LAB
ALBUMIN SERPL BCG-MCNC: 4 G/DL (ref 3.5–5.1)
ALP SERPL-CCNC: 111 U/L (ref 38–126)
ALT SERPL W/O P-5'-P-CCNC: 10 U/L (ref 11–66)
ANION GAP SERPL CALC-SCNC: 23 MEQ/L (ref 8–16)
AST SERPL-CCNC: 19 U/L (ref 5–40)
BASOPHILS ABSOLUTE: 0 THOU/MM3 (ref 0–0.1)
BASOPHILS NFR BLD AUTO: 0.3 %
BILIRUB CONJ SERPL-MCNC: < 0.1 MG/DL (ref 0.1–13.8)
BILIRUB SERPL-MCNC: 0.4 MG/DL (ref 0.3–1.2)
BUN SERPL-MCNC: 94 MG/DL (ref 7–22)
CALCIUM SERPL-MCNC: 9.1 MG/DL (ref 8.5–10.5)
CHLORIDE SERPL-SCNC: 100 MEQ/L (ref 98–111)
CO2 SERPL-SCNC: 17 MEQ/L (ref 23–33)
CREAT SERPL-MCNC: 13.6 MG/DL (ref 0.4–1.2)
DEPRECATED RDW RBC AUTO: 52.4 FL (ref 35–45)
EKG ATRIAL RATE: 96 BPM
EKG P AXIS: 57 DEGREES
EKG P-R INTERVAL: 214 MS
EKG Q-T INTERVAL: 404 MS
EKG QRS DURATION: 110 MS
EKG QTC CALCULATION (BAZETT): 510 MS
EKG R AXIS: -32 DEGREES
EKG T AXIS: 88 DEGREES
EKG VENTRICULAR RATE: 96 BPM
EOSINOPHIL NFR BLD AUTO: 0.4 %
EOSINOPHILS ABSOLUTE: 0 THOU/MM3 (ref 0–0.4)
ERYTHROCYTE [DISTWIDTH] IN BLOOD BY AUTOMATED COUNT: 14.3 % (ref 11.5–14.5)
ETHANOL SERPL-MCNC: < 0.01 % (ref 0–0.08)
GFR SERPL CREATININE-BSD FRML MDRD: 4 ML/MIN/1.73M2
GLUCOSE SERPL-MCNC: 91 MG/DL (ref 70–108)
HCT VFR BLD AUTO: 36.7 % (ref 42–52)
HGB BLD-MCNC: 11.6 GM/DL (ref 14–18)
IMM GRANULOCYTES # BLD AUTO: 0.02 THOU/MM3 (ref 0–0.07)
IMM GRANULOCYTES NFR BLD AUTO: 0.3 %
LIPASE SERPL-CCNC: 36.5 U/L (ref 5.6–51.3)
LYMPHOCYTES ABSOLUTE: 0.6 THOU/MM3 (ref 1–4.8)
LYMPHOCYTES NFR BLD AUTO: 8.8 %
MAGNESIUM SERPL-MCNC: 2.8 MG/DL (ref 1.6–2.4)
MCH RBC QN AUTO: 32 PG (ref 26–33)
MCHC RBC AUTO-ENTMCNC: 31.6 GM/DL (ref 32.2–35.5)
MCV RBC AUTO: 101.1 FL (ref 80–94)
MONOCYTES ABSOLUTE: 0.4 THOU/MM3 (ref 0.4–1.3)
MONOCYTES NFR BLD AUTO: 6.3 %
NEUTROPHILS ABSOLUTE: 5.6 THOU/MM3 (ref 1.8–7.7)
NEUTROPHILS NFR BLD AUTO: 83.9 %
NRBC BLD AUTO-RTO: 0 /100 WBC
NT-PROBNP SERPL IA-MCNC: ABNORMAL PG/ML (ref 0–124)
OSMOLALITY SERPL CALC.SUM OF ELEC: 308 MOSMOL/KG (ref 275–300)
PLATELET # BLD AUTO: 128 THOU/MM3 (ref 130–400)
PMV BLD AUTO: 9.8 FL (ref 9.4–12.4)
POTASSIUM SERPL-SCNC: 5.6 MEQ/L (ref 3.5–5.2)
PROT SERPL-MCNC: 7.7 G/DL (ref 6.1–8)
RBC # BLD AUTO: 3.63 MILL/MM3 (ref 4.7–6.1)
SODIUM SERPL-SCNC: 140 MEQ/L (ref 135–145)
TROPONIN, HIGH SENSITIVITY: 131 NG/L (ref 0–12)
TROPONIN, HIGH SENSITIVITY: 156 NG/L (ref 0–12)
TROPONIN, HIGH SENSITIVITY: 160 NG/L (ref 0–12)
WBC # BLD AUTO: 6.7 THOU/MM3 (ref 4.8–10.8)

## 2024-12-04 PROCEDURE — 83690 ASSAY OF LIPASE: CPT

## 2024-12-04 PROCEDURE — 82077 ASSAY SPEC XCP UR&BREATH IA: CPT

## 2024-12-04 PROCEDURE — 6360000002 HC RX W HCPCS: Performed by: PHYSICIAN ASSISTANT

## 2024-12-04 PROCEDURE — 85025 COMPLETE CBC W/AUTO DIFF WBC: CPT

## 2024-12-04 PROCEDURE — 5A1D70Z PERFORMANCE OF URINARY FILTRATION, INTERMITTENT, LESS THAN 6 HOURS PER DAY: ICD-10-PCS | Performed by: INTERNAL MEDICINE

## 2024-12-04 PROCEDURE — 80053 COMPREHEN METABOLIC PANEL: CPT

## 2024-12-04 PROCEDURE — 6370000000 HC RX 637 (ALT 250 FOR IP): Performed by: PHYSICIAN ASSISTANT

## 2024-12-04 PROCEDURE — 71045 X-RAY EXAM CHEST 1 VIEW: CPT

## 2024-12-04 PROCEDURE — 36415 COLL VENOUS BLD VENIPUNCTURE: CPT

## 2024-12-04 PROCEDURE — 6360000002 HC RX W HCPCS: Performed by: EMERGENCY MEDICINE

## 2024-12-04 PROCEDURE — 90935 HEMODIALYSIS ONE EVALUATION: CPT

## 2024-12-04 PROCEDURE — 2580000003 HC RX 258: Performed by: EMERGENCY MEDICINE

## 2024-12-04 PROCEDURE — 83880 ASSAY OF NATRIURETIC PEPTIDE: CPT

## 2024-12-04 PROCEDURE — 2140000000 HC CCU INTERMEDIATE R&B

## 2024-12-04 PROCEDURE — 74176 CT ABD & PELVIS W/O CONTRAST: CPT

## 2024-12-04 PROCEDURE — 2580000003 HC RX 258: Performed by: PHYSICIAN ASSISTANT

## 2024-12-04 PROCEDURE — 99223 1ST HOSP IP/OBS HIGH 75: CPT | Performed by: PHYSICIAN ASSISTANT

## 2024-12-04 PROCEDURE — 83735 ASSAY OF MAGNESIUM: CPT

## 2024-12-04 PROCEDURE — 93005 ELECTROCARDIOGRAM TRACING: CPT | Performed by: EMERGENCY MEDICINE

## 2024-12-04 PROCEDURE — 99222 1ST HOSP IP/OBS MODERATE 55: CPT | Performed by: INTERNAL MEDICINE

## 2024-12-04 PROCEDURE — 99285 EMERGENCY DEPT VISIT HI MDM: CPT

## 2024-12-04 PROCEDURE — 6370000000 HC RX 637 (ALT 250 FOR IP): Performed by: INTERNAL MEDICINE

## 2024-12-04 PROCEDURE — 93010 ELECTROCARDIOGRAM REPORT: CPT | Performed by: INTERNAL MEDICINE

## 2024-12-04 PROCEDURE — 84484 ASSAY OF TROPONIN QUANT: CPT

## 2024-12-04 PROCEDURE — 82248 BILIRUBIN DIRECT: CPT

## 2024-12-04 PROCEDURE — 96374 THER/PROPH/DIAG INJ IV PUSH: CPT

## 2024-12-04 RX ORDER — AMLODIPINE BESYLATE 10 MG/1
10 TABLET ORAL DAILY
Status: DISCONTINUED | OUTPATIENT
Start: 2024-12-04 | End: 2024-12-08 | Stop reason: HOSPADM

## 2024-12-04 RX ORDER — ACETAMINOPHEN 650 MG/1
650 SUPPOSITORY RECTAL EVERY 6 HOURS PRN
Status: DISCONTINUED | OUTPATIENT
Start: 2024-12-04 | End: 2024-12-08 | Stop reason: HOSPADM

## 2024-12-04 RX ORDER — ASPIRIN 81 MG/1
81 TABLET ORAL DAILY
Status: DISCONTINUED | OUTPATIENT
Start: 2024-12-04 | End: 2024-12-08 | Stop reason: HOSPADM

## 2024-12-04 RX ORDER — LORAZEPAM 2 MG/1
4 TABLET ORAL
Status: DISCONTINUED | OUTPATIENT
Start: 2024-12-04 | End: 2024-12-08 | Stop reason: HOSPADM

## 2024-12-04 RX ORDER — CLONIDINE HYDROCHLORIDE 0.1 MG/1
0.1 TABLET ORAL
Status: COMPLETED | OUTPATIENT
Start: 2024-12-04 | End: 2024-12-04

## 2024-12-04 RX ORDER — SODIUM CHLORIDE 0.9 % (FLUSH) 0.9 %
5-40 SYRINGE (ML) INJECTION PRN
Status: DISCONTINUED | OUTPATIENT
Start: 2024-12-04 | End: 2024-12-08 | Stop reason: HOSPADM

## 2024-12-04 RX ORDER — ATORVASTATIN CALCIUM 40 MG/1
40 TABLET, FILM COATED ORAL DAILY
Status: DISCONTINUED | OUTPATIENT
Start: 2024-12-04 | End: 2024-12-08 | Stop reason: HOSPADM

## 2024-12-04 RX ORDER — POLYETHYLENE GLYCOL 3350 17 G/17G
17 POWDER, FOR SOLUTION ORAL DAILY PRN
Status: DISCONTINUED | OUTPATIENT
Start: 2024-12-04 | End: 2024-12-08 | Stop reason: HOSPADM

## 2024-12-04 RX ORDER — LORAZEPAM 2 MG/ML
2 INJECTION INTRAMUSCULAR
Status: DISCONTINUED | OUTPATIENT
Start: 2024-12-04 | End: 2024-12-08 | Stop reason: HOSPADM

## 2024-12-04 RX ORDER — LANOLIN ALCOHOL/MO/W.PET/CERES
100 CREAM (GRAM) TOPICAL DAILY
Status: DISCONTINUED | OUTPATIENT
Start: 2024-12-04 | End: 2024-12-08 | Stop reason: HOSPADM

## 2024-12-04 RX ORDER — ONDANSETRON 4 MG/1
4 TABLET, ORALLY DISINTEGRATING ORAL EVERY 8 HOURS PRN
Status: DISCONTINUED | OUTPATIENT
Start: 2024-12-04 | End: 2024-12-08 | Stop reason: HOSPADM

## 2024-12-04 RX ORDER — SODIUM CHLORIDE 0.9 % (FLUSH) 0.9 %
5-40 SYRINGE (ML) INJECTION PRN
Status: DISCONTINUED | OUTPATIENT
Start: 2024-12-04 | End: 2024-12-04 | Stop reason: SDUPTHER

## 2024-12-04 RX ORDER — CINACALCET 30 MG/1
90 TABLET, FILM COATED ORAL
Status: DISCONTINUED | OUTPATIENT
Start: 2024-12-06 | End: 2024-12-08 | Stop reason: HOSPADM

## 2024-12-04 RX ORDER — LORAZEPAM 1 MG/1
1 TABLET ORAL
Status: DISCONTINUED | OUTPATIENT
Start: 2024-12-04 | End: 2024-12-08 | Stop reason: HOSPADM

## 2024-12-04 RX ORDER — DOXAZOSIN 4 MG/1
4 TABLET ORAL EVERY 12 HOURS SCHEDULED
Status: DISCONTINUED | OUTPATIENT
Start: 2024-12-04 | End: 2024-12-07

## 2024-12-04 RX ORDER — HEPARIN SODIUM 5000 [USP'U]/ML
5000 INJECTION, SOLUTION INTRAVENOUS; SUBCUTANEOUS EVERY 8 HOURS SCHEDULED
Status: DISCONTINUED | OUTPATIENT
Start: 2024-12-04 | End: 2024-12-08 | Stop reason: HOSPADM

## 2024-12-04 RX ORDER — LORAZEPAM 2 MG/ML
3 INJECTION INTRAMUSCULAR
Status: DISCONTINUED | OUTPATIENT
Start: 2024-12-04 | End: 2024-12-08 | Stop reason: HOSPADM

## 2024-12-04 RX ORDER — AMOXICILLIN AND CLAVULANATE POTASSIUM 500; 125 MG/1; MG/1
1 TABLET, FILM COATED ORAL
Status: DISCONTINUED | OUTPATIENT
Start: 2024-12-04 | End: 2024-12-06

## 2024-12-04 RX ORDER — LORAZEPAM 2 MG/1
2 TABLET ORAL
Status: DISCONTINUED | OUTPATIENT
Start: 2024-12-04 | End: 2024-12-08 | Stop reason: HOSPADM

## 2024-12-04 RX ORDER — HYDRALAZINE HYDROCHLORIDE 50 MG/1
100 TABLET, FILM COATED ORAL EVERY 8 HOURS
Status: DISCONTINUED | OUTPATIENT
Start: 2024-12-04 | End: 2024-12-06

## 2024-12-04 RX ORDER — PANTOPRAZOLE SODIUM 40 MG/1
40 TABLET, DELAYED RELEASE ORAL
Status: DISCONTINUED | OUTPATIENT
Start: 2024-12-05 | End: 2024-12-08 | Stop reason: HOSPADM

## 2024-12-04 RX ORDER — LORAZEPAM 2 MG/ML
1 INJECTION INTRAMUSCULAR
Status: DISCONTINUED | OUTPATIENT
Start: 2024-12-04 | End: 2024-12-08 | Stop reason: HOSPADM

## 2024-12-04 RX ORDER — CALCITRIOL 0.25 UG/1
0.5 CAPSULE, LIQUID FILLED ORAL
Status: DISCONTINUED | OUTPATIENT
Start: 2024-12-06 | End: 2024-12-08 | Stop reason: HOSPADM

## 2024-12-04 RX ORDER — ONDANSETRON 2 MG/ML
4 INJECTION INTRAMUSCULAR; INTRAVENOUS EVERY 6 HOURS PRN
Status: DISCONTINUED | OUTPATIENT
Start: 2024-12-04 | End: 2024-12-08 | Stop reason: HOSPADM

## 2024-12-04 RX ORDER — SODIUM CHLORIDE 9 MG/ML
INJECTION, SOLUTION INTRAVENOUS PRN
Status: DISCONTINUED | OUTPATIENT
Start: 2024-12-04 | End: 2024-12-04 | Stop reason: SDUPTHER

## 2024-12-04 RX ORDER — SODIUM CHLORIDE 0.9 % (FLUSH) 0.9 %
5-40 SYRINGE (ML) INJECTION EVERY 12 HOURS SCHEDULED
Status: DISCONTINUED | OUTPATIENT
Start: 2024-12-04 | End: 2024-12-08 | Stop reason: HOSPADM

## 2024-12-04 RX ORDER — ACETAMINOPHEN 325 MG/1
650 TABLET ORAL EVERY 6 HOURS PRN
Status: DISCONTINUED | OUTPATIENT
Start: 2024-12-04 | End: 2024-12-08 | Stop reason: HOSPADM

## 2024-12-04 RX ORDER — SODIUM CHLORIDE 9 MG/ML
INJECTION, SOLUTION INTRAVENOUS PRN
Status: DISCONTINUED | OUTPATIENT
Start: 2024-12-04 | End: 2024-12-08 | Stop reason: HOSPADM

## 2024-12-04 RX ORDER — ISOSORBIDE MONONITRATE 60 MG/1
120 TABLET, EXTENDED RELEASE ORAL 2 TIMES DAILY
Status: DISCONTINUED | OUTPATIENT
Start: 2024-12-04 | End: 2024-12-07

## 2024-12-04 RX ORDER — LORAZEPAM 2 MG/ML
4 INJECTION INTRAMUSCULAR
Status: DISCONTINUED | OUTPATIENT
Start: 2024-12-04 | End: 2024-12-08 | Stop reason: HOSPADM

## 2024-12-04 RX ORDER — SODIUM CHLORIDE 0.9 % (FLUSH) 0.9 %
5-40 SYRINGE (ML) INJECTION EVERY 12 HOURS SCHEDULED
Status: DISCONTINUED | OUTPATIENT
Start: 2024-12-04 | End: 2024-12-04 | Stop reason: SDUPTHER

## 2024-12-04 RX ADMIN — SODIUM CHLORIDE 15 MG/HR: 9 INJECTION, SOLUTION INTRAVENOUS at 18:08

## 2024-12-04 RX ADMIN — ISOSORBIDE MONONITRATE 120 MG: 60 TABLET, EXTENDED RELEASE ORAL at 21:20

## 2024-12-04 RX ADMIN — ATORVASTATIN CALCIUM 40 MG: 40 TABLET, FILM COATED ORAL at 21:20

## 2024-12-04 RX ADMIN — CLONIDINE HYDROCHLORIDE 0.1 MG: 0.1 TABLET ORAL at 18:58

## 2024-12-04 RX ADMIN — CLONIDINE HYDROCHLORIDE 0.3 MG: 0.2 TABLET ORAL at 21:54

## 2024-12-04 RX ADMIN — SODIUM CHLORIDE 15 MG/HR: 9 INJECTION, SOLUTION INTRAVENOUS at 20:43

## 2024-12-04 RX ADMIN — Medication 100 MG: at 21:56

## 2024-12-04 RX ADMIN — ASPIRIN 81 MG: 81 TABLET, COATED ORAL at 21:54

## 2024-12-04 RX ADMIN — SODIUM CHLORIDE 12.5 MG/HR: 9 INJECTION, SOLUTION INTRAVENOUS at 15:31

## 2024-12-04 RX ADMIN — DOXAZOSIN 4 MG: 4 TABLET ORAL at 21:54

## 2024-12-04 RX ADMIN — AMOXICILLIN AND CLAVULANATE POTASSIUM 1 TABLET: 500; 125 TABLET, FILM COATED ORAL at 21:20

## 2024-12-04 RX ADMIN — SODIUM CHLORIDE 5 MG/HR: 9 INJECTION, SOLUTION INTRAVENOUS at 12:00

## 2024-12-04 RX ADMIN — HYDRALAZINE HYDROCHLORIDE 100 MG: 50 TABLET ORAL at 21:20

## 2024-12-04 ASSESSMENT — PAIN DESCRIPTION - LOCATION: LOCATION: CHEST

## 2024-12-04 ASSESSMENT — PAIN SCALES - GENERAL: PAINLEVEL_OUTOF10: 8

## 2024-12-04 ASSESSMENT — PAIN - FUNCTIONAL ASSESSMENT
PAIN_FUNCTIONAL_ASSESSMENT: NONE - DENIES PAIN
PAIN_FUNCTIONAL_ASSESSMENT: 0-10

## 2024-12-04 ASSESSMENT — PAIN DESCRIPTION - PAIN TYPE: TYPE: ACUTE PAIN

## 2024-12-04 ASSESSMENT — PAIN DESCRIPTION - DESCRIPTORS: DESCRIPTORS: ACHING

## 2024-12-04 NOTE — H&P
hydrALAZINE (APRESOLINE) 100 MG tablet Take 1 tablet by mouth every 8 (eight) hours 90 tablet 0    amLODIPine (NORVASC) 10 MG tablet Take 1 tablet by mouth daily 30 tablet 0    traZODone (DESYREL) 100 MG tablet Take 1 tablet by mouth nightly as needed for Sleep 30 tablet 0    cinacalcet (SENSIPAR) 90 MG tablet Take 1 tablet by mouth three times a week 12 tablet 0    calcitRIOL (ROCALTROL) 0.5 MCG capsule Take 1 capsule by mouth three times a week 12 capsule 0    atorvastatin (LIPITOR) 80 MG tablet Take 0.5 tablets by mouth daily 15 tablet 0    albuterol sulfate HFA (VENTOLIN HFA) 108 (90 Base) MCG/ACT inhaler Inhale 2 puffs into the lungs every 6 hours as needed for Wheezing         Allergies:    Humalog [insulin lispro], Insulin regular human, and Lisinopril    Social History:    reports that he has been smoking cigarettes. He started smoking about 39 years ago. He has a 9.8 pack-year smoking history. He has never used smokeless tobacco. He reports current alcohol use of about 20.0 standard drinks of alcohol per week. He reports current drug use. Drug: Cocaine.    Family History:       Problem Relation Age of Onset    Cancer Mother     Other Brother         aneurysm        Diet:  ADULT DIET; Regular; 5 carb choices (75 gm/meal); Low Fat/Low Chol/High Fiber/WEI; Low Sodium (2 gm); Low Potassium (Less than 3000 mg/day); Low Phosphorus (Less than 1000 mg); 2000 ml; Safety Tray; Safety Tray (Disposables)    Physical Exam:  BP (!) 207/104   Pulse 93   Temp 98.9 °F (37.2 °C)   Resp 16   Wt 103 kg (227 lb 1.2 oz)   SpO2 97%   BMI 28.38 kg/m²   General: Alert, in no acute distress, cooperative  HEENT:  Normocephalic and atraumatic.  No scleral icterus. External nares without deformity.  External ears normal. Mucous membranes are moist.  Neck: Trachea midline. No jugular venous distension.  Lungs: On room air. Respiratory rate and effort normal.  Lungs clear to auscultation bilaterally.  Cardiac: Regular rhythm and

## 2024-12-04 NOTE — ED PROVIDER NOTES
[facility]  eMERGENCY dEPARTMENT eNCOUnter          CHIEF COMPLAINT       Chief Complaint   Patient presents with    Chest Pain       Nurses Notes reviewed and I agree except as noted in the HPI.      HISTORY OF PRESENT ILLNESS    Trav Jennings is a 59 y.o. male who presents chest pain shortness of breath.  This is a patient who states he fell off the wagon.  He was drinking.  He started doing cocaine again.  Patient states that he he \"needs to get his head straight\".  Patient states that he really needs to talk to somebody.  Here today the patient states that he has not been taking his medications.  His blood pressure is extremely high.  Patient is not complaining of any headache or dizziness.  Patient states that chest pain is centralized in nature.  It feels like a heaviness.  No radiation referral.  Patient denies any nausea or vomiting.  No abdominal pain or changes in bowel or bladder habits.  Patient is a hemodialysis patient states he has been going to hemodialysis.  Patient is otherwise resting comfortably on the cot no apparent distress no other physical complaints at this time.  Patient is full code.  When I asked the patient if he was suicidal or homicidal he states that he is in \"a really bad patient his head\".  States that he needs to talk to somebody about all this.    REVIEW OF SYSTEMS     Review of Systems   Constitutional:  Negative for activity change, appetite change, diaphoresis, fatigue and unexpected weight change.   HENT:  Negative for congestion, ear discharge, ear pain, hearing loss, rhinorrhea, sinus pressure, sore throat, trouble swallowing and voice change.    Eyes:  Negative for photophobia, pain, discharge, redness and itching.   Respiratory:  Negative for cough, choking, chest tightness, shortness of breath and wheezing.    Cardiovascular:  Positive for chest pain. Negative for palpitations and leg swelling.   Gastrointestinal:  Negative for abdominal distention,

## 2024-12-04 NOTE — ED NOTES
Patient to the ED with complaint of chest pain. Patient states his pain onset last night. He states that he is on hemodialysis, most recently 4 days ago. He states that for the past several days he has been consuming increasing amounts of alcohol and cocaine. He states that he is a previous addict. Patient occasionally tearful during triage. CSSRS completed. Patient is resting in bed with easy and unlabored respirations. Call light in reach. Side rails up x2. Patient denies further complaints or concerns. Will monitor.

## 2024-12-05 LAB
ANION GAP SERPL CALC-SCNC: 13 MEQ/L (ref 8–16)
BASOPHILS ABSOLUTE: 0 THOU/MM3 (ref 0–0.1)
BASOPHILS NFR BLD AUTO: 0.4 %
BUN SERPL-MCNC: 44 MG/DL (ref 7–22)
CALCIUM SERPL-MCNC: 7.9 MG/DL (ref 8.5–10.5)
CHLORIDE SERPL-SCNC: 103 MEQ/L (ref 98–111)
CO2 SERPL-SCNC: 22 MEQ/L (ref 23–33)
CREAT SERPL-MCNC: 8 MG/DL (ref 0.4–1.2)
DEPRECATED RDW RBC AUTO: 55 FL (ref 35–45)
EOSINOPHIL NFR BLD AUTO: 3.1 %
EOSINOPHILS ABSOLUTE: 0.1 THOU/MM3 (ref 0–0.4)
ERYTHROCYTE [DISTWIDTH] IN BLOOD BY AUTOMATED COUNT: 14.2 % (ref 11.5–14.5)
GFR SERPL CREATININE-BSD FRML MDRD: 7 ML/MIN/1.73M2
GLUCOSE SERPL-MCNC: 167 MG/DL (ref 70–108)
HCT VFR BLD AUTO: 34.9 % (ref 42–52)
HGB BLD-MCNC: 10.6 GM/DL (ref 14–18)
IMM GRANULOCYTES # BLD AUTO: 0.02 THOU/MM3 (ref 0–0.07)
IMM GRANULOCYTES NFR BLD AUTO: 0.4 %
LYMPHOCYTES ABSOLUTE: 0.5 THOU/MM3 (ref 1–4.8)
LYMPHOCYTES NFR BLD AUTO: 11.9 %
MCH RBC QN AUTO: 32.1 PG (ref 26–33)
MCHC RBC AUTO-ENTMCNC: 30.4 GM/DL (ref 32.2–35.5)
MCV RBC AUTO: 105.8 FL (ref 80–94)
MONOCYTES ABSOLUTE: 0.5 THOU/MM3 (ref 0.4–1.3)
MONOCYTES NFR BLD AUTO: 10.5 %
NEUTROPHILS ABSOLUTE: 3.4 THOU/MM3 (ref 1.8–7.7)
NEUTROPHILS NFR BLD AUTO: 73.7 %
NRBC BLD AUTO-RTO: 0 /100 WBC
PLATELET # BLD AUTO: 109 THOU/MM3 (ref 130–400)
PMV BLD AUTO: 10.2 FL (ref 9.4–12.4)
POTASSIUM SERPL-SCNC: 4.9 MEQ/L (ref 3.5–5.2)
RBC # BLD AUTO: 3.3 MILL/MM3 (ref 4.7–6.1)
SODIUM SERPL-SCNC: 138 MEQ/L (ref 135–145)
WBC # BLD AUTO: 4.6 THOU/MM3 (ref 4.8–10.8)

## 2024-12-05 PROCEDURE — 99232 SBSQ HOSP IP/OBS MODERATE 35: CPT | Performed by: INTERNAL MEDICINE

## 2024-12-05 PROCEDURE — 6360000002 HC RX W HCPCS: Performed by: PHYSICIAN ASSISTANT

## 2024-12-05 PROCEDURE — 85025 COMPLETE CBC W/AUTO DIFF WBC: CPT

## 2024-12-05 PROCEDURE — 80048 BASIC METABOLIC PNL TOTAL CA: CPT

## 2024-12-05 PROCEDURE — 6370000000 HC RX 637 (ALT 250 FOR IP): Performed by: PHYSICIAN ASSISTANT

## 2024-12-05 PROCEDURE — 90935 HEMODIALYSIS ONE EVALUATION: CPT

## 2024-12-05 PROCEDURE — 2140000000 HC CCU INTERMEDIATE R&B

## 2024-12-05 PROCEDURE — 6370000000 HC RX 637 (ALT 250 FOR IP): Performed by: PSYCHIATRY & NEUROLOGY

## 2024-12-05 PROCEDURE — 99223 1ST HOSP IP/OBS HIGH 75: CPT | Performed by: NUCLEAR MEDICINE

## 2024-12-05 PROCEDURE — 36415 COLL VENOUS BLD VENIPUNCTURE: CPT

## 2024-12-05 PROCEDURE — 2580000003 HC RX 258: Performed by: PHYSICIAN ASSISTANT

## 2024-12-05 PROCEDURE — 2580000003 HC RX 258: Performed by: INTERNAL MEDICINE

## 2024-12-05 RX ORDER — TRAZODONE HYDROCHLORIDE 100 MG/1
100 TABLET ORAL NIGHTLY PRN
Status: DISCONTINUED | OUTPATIENT
Start: 2024-12-05 | End: 2024-12-08 | Stop reason: HOSPADM

## 2024-12-05 RX ORDER — 0.9 % SODIUM CHLORIDE 0.9 %
250 INTRAVENOUS SOLUTION INTRAVENOUS ONCE
Status: COMPLETED | OUTPATIENT
Start: 2024-12-05 | End: 2024-12-05

## 2024-12-05 RX ADMIN — HEPARIN SODIUM 5000 UNITS: 5000 INJECTION INTRAVENOUS; SUBCUTANEOUS at 20:44

## 2024-12-05 RX ADMIN — HEPARIN SODIUM 5000 UNITS: 5000 INJECTION INTRAVENOUS; SUBCUTANEOUS at 06:05

## 2024-12-05 RX ADMIN — DOXAZOSIN 4 MG: 4 TABLET ORAL at 20:44

## 2024-12-05 RX ADMIN — PANTOPRAZOLE SODIUM 40 MG: 40 TABLET, DELAYED RELEASE ORAL at 06:05

## 2024-12-05 RX ADMIN — SODIUM CHLORIDE, PRESERVATIVE FREE 10 ML: 5 INJECTION INTRAVENOUS at 20:44

## 2024-12-05 RX ADMIN — ISOSORBIDE MONONITRATE 120 MG: 60 TABLET, EXTENDED RELEASE ORAL at 11:20

## 2024-12-05 RX ADMIN — CLONIDINE HYDROCHLORIDE 0.3 MG: 0.2 TABLET ORAL at 16:45

## 2024-12-05 RX ADMIN — ASPIRIN 81 MG: 81 TABLET, COATED ORAL at 13:06

## 2024-12-05 RX ADMIN — HEPARIN SODIUM 5000 UNITS: 5000 INJECTION INTRAVENOUS; SUBCUTANEOUS at 16:45

## 2024-12-05 RX ADMIN — AMOXICILLIN AND CLAVULANATE POTASSIUM 1 TABLET: 500; 125 TABLET, FILM COATED ORAL at 13:01

## 2024-12-05 RX ADMIN — SODIUM CHLORIDE, PRESERVATIVE FREE 10 ML: 5 INJECTION INTRAVENOUS at 13:06

## 2024-12-05 RX ADMIN — SERTRALINE 150 MG: 100 TABLET, FILM COATED ORAL at 23:30

## 2024-12-05 RX ADMIN — ATORVASTATIN CALCIUM 40 MG: 40 TABLET, FILM COATED ORAL at 20:44

## 2024-12-05 RX ADMIN — SODIUM CHLORIDE 250 ML: 9 INJECTION, SOLUTION INTRAVENOUS at 03:19

## 2024-12-05 RX ADMIN — ISOSORBIDE MONONITRATE 120 MG: 60 TABLET, EXTENDED RELEASE ORAL at 23:30

## 2024-12-05 RX ADMIN — DOXAZOSIN 4 MG: 4 TABLET ORAL at 11:21

## 2024-12-05 RX ADMIN — HYDRALAZINE HYDROCHLORIDE 100 MG: 50 TABLET ORAL at 16:45

## 2024-12-05 RX ADMIN — CLONIDINE HYDROCHLORIDE 0.3 MG: 0.2 TABLET ORAL at 11:21

## 2024-12-05 RX ADMIN — CLONIDINE HYDROCHLORIDE 0.3 MG: 0.2 TABLET ORAL at 20:44

## 2024-12-05 RX ADMIN — Medication 100 MG: at 13:01

## 2024-12-05 ASSESSMENT — PATIENT HEALTH QUESTIONNAIRE - PHQ9
9. THOUGHTS THAT YOU WOULD BE BETTER OFF DEAD, OR OF HURTING YOURSELF: SEVERAL DAYS
SUM OF ALL RESPONSES TO PHQ QUESTIONS 1-9: 15
10. IF YOU CHECKED OFF ANY PROBLEMS, HOW DIFFICULT HAVE THESE PROBLEMS MADE IT FOR YOU TO DO YOUR WORK, TAKE CARE OF THINGS AT HOME, OR GET ALONG WITH OTHER PEOPLE: EXTREMELY DIFFICULT
6. FEELING BAD ABOUT YOURSELF - OR THAT YOU ARE A FAILURE OR HAVE LET YOURSELF OR YOUR FAMILY DOWN: SEVERAL DAYS
SUM OF ALL RESPONSES TO PHQ QUESTIONS 1-9: 15
SUM OF ALL RESPONSES TO PHQ9 QUESTIONS 1 & 2: 4
7. TROUBLE CONCENTRATING ON THINGS, SUCH AS READING THE NEWSPAPER OR WATCHING TELEVISION: MORE THAN HALF THE DAYS
SUM OF ALL RESPONSES TO PHQ QUESTIONS 1-9: 15
5. POOR APPETITE OR OVEREATING: NOT AT ALL
8. MOVING OR SPEAKING SO SLOWLY THAT OTHER PEOPLE COULD HAVE NOTICED. OR THE OPPOSITE, BEING SO FIGETY OR RESTLESS THAT YOU HAVE BEEN MOVING AROUND A LOT MORE THAN USUAL: SEVERAL DAYS
1. LITTLE INTEREST OR PLEASURE IN DOING THINGS: NEARLY EVERY DAY
2. FEELING DOWN, DEPRESSED OR HOPELESS: SEVERAL DAYS
SUM OF ALL RESPONSES TO PHQ QUESTIONS 1-9: 14
4. FEELING TIRED OR HAVING LITTLE ENERGY: NEARLY EVERY DAY
3. TROUBLE FALLING OR STAYING ASLEEP: NEARLY EVERY DAY

## 2024-12-05 ASSESSMENT — LIFESTYLE VARIABLES
HOW OFTEN DO YOU HAVE A DRINK CONTAINING ALCOHOL: 2-3 TIMES A WEEK
HOW MANY STANDARD DRINKS CONTAINING ALCOHOL DO YOU HAVE ON A TYPICAL DAY: 10 OR MORE

## 2024-12-05 NOTE — FLOWSHEET NOTE
12/04/24 1855   Vital Signs   BP (!) 189/89   Pulse 94   Respirations 22   SpO2 97 %   Weight - Scale 98.5 kg (217 lb 2.5 oz)   Weight Method Bed scale   Percent Weight Change -4.37   Post-Hemodialysis Assessment   Machine Disinfection Process Acid/Vinegar Clean;Heat Disinfect;Exterior Machine Disinfection   Rinseback Volume (ml) 400 ml   Blood Volume Processed (Liters) 115.4 L   Dialyzer Clearance Moderately streaked   Duration of Treatment (minutes) 240 minutes   Hemodialysis Intake (ml) 400 ml   Hemodialysis Output (ml) 4900 ml   NET Removed (ml) 4500   Tolerated Treatment Good     Stable 4 hour treatment completed. Removed 4.5 liters of fluid. Tolerated fluid removal well. pt hypertensive, asymptomatic, and receiving Cardene drip with titrating instructions followed as ordered. Dr. Clayton notified regarding pt's high BP and ordered Clonidine 0.1 mg, Pressure held to needle sites times ten minutes each. Dressing clean, dry and intact. Report given to primary RN. Treatment record printed for scanning into EMR.

## 2024-12-05 NOTE — PLAN OF CARE
Problem: Safety - Adult  Goal: Free from fall injury  Outcome: Progressing  Flowsheets (Taken 12/5/2024 0556)  Free From Fall Injury: Instruct family/caregiver on patient safety     Problem: Risk for Elopement  Goal: Patient will not exit the unit/facility without proper excort  Outcome: Progressing  Flowsheets (Taken 12/5/2024 0556)  Nursing Interventions for Elopement Risk:   Assist with personal care needs such as toileting, eating, dressing, as needed to reduce the risk of wandering   Collaborate with family members/caregivers to mitigate the elopement risk     Problem: Self Harm/Suicidality  Goal: Will have no self-injury during hospital stay  Description: INTERVENTIONS:  1.  Ensure constant observer at bedside with Q15M safety checks  2.  Maintain a safe environment  3.  Secure patient belongings  4.  Ensure family/visitors adhere to safety recommendations  5.  Ensure safety tray has been added to patient's diet order  6.  Every shift and PRN: Re-assess suicidal risk via Frequent Screener    Outcome: Progressing  Flowsheets (Taken 12/5/2024 0556)  Will have no self-injury during hospital stay:   Maintain a safe environment   Secure patient belongings   Ensure family/visitors adhere to safety recommendations     Care plan reviewed with patient.  Patient verbalizes understanding of the care plan and contributed to goal setting.

## 2024-12-05 NOTE — CARE COORDINATION
12/5/24, 8:32 AM EST    DISCHARGE PLANNING EVALUATION    Received Social Work consult “For consideration of Rehab”.  Addiction/LIZ  consulted.   met with patient, following for needs.  Full Social Consult deferred.    SW noted psych consult for suicidal ideation

## 2024-12-05 NOTE — CARE COORDINATION
Case Management Assessment Initial Evaluation    Date/Time of Evaluation: 2024 8:06 AM  Assessment Completed by: Yue Chen RN    If patient is discharged prior to next notation, then this note serves as note for discharge by case management.    Patient Name: Trav Jain                   YOB: 1965  Diagnosis: ESRD (end stage renal disease) ([facility]) [N18.6]  Uncontrolled hypertension [I10]  Depression, unspecified depression type [F32.A]  Chest pain, unspecified type [R07.9]                   Date / Time: 2024 10:00 AM  Location: [id number]     Patient Admission Status: Inpatient   Readmission Risk Low 0-14, Mod 15-19), High > 20: Readmission Risk Score: 23.9    Current PCP: Radha Hou APRN - CNP  Health Care Decision Makers:   Primary Decision Maker: UZIEL JAIN - Spouse - [phone]    Additional Case Management Notes: Admitted through ED with chest pain. Pt with ESRD with cocaine and alcohol abuse. Blood pressure elevated over 200 in ED (250/99). BNP 90996.0. BUN 94, creatinine 13.6. Troponins 131, 160 and 156. Heparin subq q8hr. Ativan prn. Placed on Cardene gtt overnight, BP down to 140/80 today and Cardene is off. Consulted Nephrology and Psych. HD yesterday and plan again today.     Procedures: None    Imagin/4 PCXR:  1. Moderate cardiomegaly. An endograft is present in the aortic arch extending into the descending thoracic aorta. 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  2. Mild pleural thickening along the lateral aspect of each lung base. Moderate interstitial fibrosis both lung bases.  3. Hazy appearance both mid and lower lung fields, consistent with mild  interstitial pneumonitis/edema.   CT abd/pelvis: No acute findings.     Patient Goals/Plan/Treatment Preferences: Spoke with pt during dialysis. He lives at home with his wife. He is current with [facility] for OP HD, TRS at 0650. Uses [facility] for transportation. He has home O2 from [facility], states wears

## 2024-12-05 NOTE — FLOWSHEET NOTE
12/05/24 0737 12/05/24 1200   Vital Signs   BP (!) 140/80 (!) 187/89   Temp 98.3 °F (36.8 °C) 98 °F (36.7 °C)   Pulse 85 96   Respirations 15 21   SpO2 97 % 96 %   Weight - Scale 97.7 kg (215 lb 6.2 oz) 96.7 kg (213 lb 3 oz)   Weight Method Standing scale  --    Percent Weight Change -3.84 -1.02   Post-Hemodialysis Assessment   Post-Treatment Procedures  --  Blood returned;Access bleeding time < 10 minutes   Machine Disinfection Process  --  Acid/Vinegar Clean;Heat Disinfect;Exterior Machine Disinfection   Blood Volume Processed (Liters)  --  110.8 L   Dialyzer Clearance  --  Lightly streaked   Duration of Treatment (minutes)  --  240 minutes   Heparin Amount Administered During Treatment (mL)  --  0 mL   Hemodialysis Intake (ml)  --  400 ml   Hemodialysis Output (ml)  --  1400 ml   NET Removed (ml)  --  1000     4 hour treatment completed. Attempted to remove 2L of fluid but patient was cramping UF goal decreased multiple times. 1L of fluid removed. Pressure held to access for 10 mins x2. Dressing clean/dry and intact upon leaving the unit. Report given to primary RN. Charting printed and placed in bin to be scanned into EMR.

## 2024-12-06 ENCOUNTER — APPOINTMENT (OUTPATIENT)
Dept: NUCLEAR MEDICINE | Age: 59
DRG: 917 | End: 2024-12-06
Payer: MEDICARE

## 2024-12-06 ENCOUNTER — HOSPITAL ENCOUNTER (INPATIENT)
Age: 59
Discharge: HOME | DRG: 917 | End: 2024-12-08
Payer: MEDICARE

## 2024-12-06 ENCOUNTER — APPOINTMENT (OUTPATIENT)
Age: 59
DRG: 917 | End: 2024-12-06
Payer: MEDICARE

## 2024-12-06 LAB
ANION GAP SERPL CALC-SCNC: 11 MEQ/L (ref 8–16)
BUN SERPL-MCNC: 30 MG/DL (ref 7–22)
CALCIUM SERPL-MCNC: 8.4 MG/DL (ref 8.5–10.5)
CHLORIDE SERPL-SCNC: 103 MEQ/L (ref 98–111)
CO2 SERPL-SCNC: 24 MEQ/L (ref 23–33)
CREAT SERPL-MCNC: 6.2 MG/DL (ref 0.4–1.2)
ECHO AO ASC DIAM: 3.8 CM
ECHO AO ASCENDING AORTA INDEX: 1.69 CM/M2
ECHO AR MAX VEL PISA: 4.7 M/S
ECHO AV CUSP MM: 1.9 CM
ECHO AV REGURGITANT PHT: 441 MS
ECHO BSA: 2.25 M2
ECHO BSA: 2.25 M2
ECHO EST RA PRESSURE: 5 MMHG
ECHO LA AREA 2C: 31.1 CM2
ECHO LA AREA 4C: 32.1 CM2
ECHO LA DIAMETER INDEX: 2.22 CM/M2
ECHO LA DIAMETER: 5 CM
ECHO LA MAJOR AXIS: 7.9 CM
ECHO LA MINOR AXIS: 7.3 CM
ECHO LA VOL BP: 115 ML (ref 18–58)
ECHO LA VOL MOD A2C: 110 ML (ref 18–58)
ECHO LA VOL MOD A4C: 111 ML (ref 18–58)
ECHO LA VOL/BSA BIPLANE: 51 ML/M2 (ref 16–34)
ECHO LA VOLUME INDEX MOD A2C: 49 ML/M2 (ref 16–34)
ECHO LA VOLUME INDEX MOD A4C: 49 ML/M2 (ref 16–34)
ECHO LV E' LATERAL VELOCITY: 5 CM/S
ECHO LV E' SEPTAL VELOCITY: 6.1 CM/S
ECHO LV EJECTION FRACTION BIPLANE: 65 % (ref 55–100)
ECHO LV FRACTIONAL SHORTENING: 39 % (ref 28–44)
ECHO LV INTERNAL DIMENSION DIASTOLE INDEX: 1.82 CM/M2
ECHO LV INTERNAL DIMENSION DIASTOLIC: 4.1 CM (ref 4.2–5.9)
ECHO LV INTERNAL DIMENSION SYSTOLIC INDEX: 1.11 CM/M2
ECHO LV INTERNAL DIMENSION SYSTOLIC: 2.5 CM
ECHO LV IVSD: 2.5 CM (ref 0.6–1)
ECHO LV MASS 2D: 570.2 G (ref 88–224)
ECHO LV MASS INDEX 2D: 253.4 G/M2 (ref 49–115)
ECHO LV POSTERIOR WALL DIASTOLIC: 2.5 CM (ref 0.6–1)
ECHO LV RELATIVE WALL THICKNESS RATIO: 1.22
ECHO LVOT AREA: 3.8 CM2
ECHO LVOT DIAM: 2.2 CM
ECHO MV A VELOCITY: 1.18 M/S
ECHO MV E DECELERATION TIME (DT): 257 MS
ECHO MV E VELOCITY: 1.06 M/S
ECHO MV E/A RATIO: 0.9
ECHO MV E/E' LATERAL: 21.2
ECHO MV E/E' RATIO (AVERAGED): 19.29
ECHO MV E/E' SEPTAL: 17.38
ECHO MV MAX VELOCITY: 1.4 M/S
ECHO MV MEAN GRADIENT: 4 MMHG
ECHO MV MEAN VELOCITY: 1 M/S
ECHO MV PEAK GRADIENT: 8 MMHG
ECHO MV REGURGITANT PEAK GRADIENT: 154 MMHG
ECHO MV REGURGITANT PEAK VELOCITY: 6.2 M/S
ECHO MV VTI: 41.9 CM
ECHO PV MAX VELOCITY: 1 M/S
ECHO PV PEAK GRADIENT: 4 MMHG
ECHO RIGHT VENTRICULAR SYSTOLIC PRESSURE (RVSP): 53 MMHG
ECHO RV INTERNAL DIMENSION: 3 CM
ECHO RV TAPSE: 2.6 CM (ref 1.7–?)
ECHO TV E WAVE: 0.5 M/S
ECHO TV REGURGITANT MAX VELOCITY: 3.48 M/S
ECHO TV REGURGITANT PEAK GRADIENT: 48 MMHG
GFR SERPL CREATININE-BSD FRML MDRD: 10 ML/MIN/1.73M2
GLUCOSE SERPL-MCNC: 89 MG/DL (ref 70–108)
NUC STRESS EJECTION FRACTION: 25 %
POTASSIUM SERPL-SCNC: 5 MEQ/L (ref 3.5–5.2)
SODIUM SERPL-SCNC: 138 MEQ/L (ref 135–145)
STRESS BASELINE DIAS BP: 55 MMHG
STRESS BASELINE HR: 68 BPM
STRESS BASELINE SYS BP: 115 MMHG
STRESS ESTIMATED WORKLOAD: 1 METS
STRESS PEAK DIAS BP: 58 MMHG
STRESS PEAK SYS BP: 117 MMHG
STRESS PERCENT HR ACHIEVED: 53 %
STRESS POST PEAK HR: 87 BPM
STRESS RATE PRESSURE PRODUCT: NORMAL BPM*MMHG
STRESS STAGE 1 BP: NORMAL MMHG
STRESS STAGE 1 DURATION: 1 MIN:SEC
STRESS STAGE 1 HR: 68 BPM
STRESS STAGE 2 BP: NORMAL MMHG
STRESS STAGE 2 DURATION: 1 MIN:SEC
STRESS STAGE 2 HR: 82 BPM
STRESS STAGE 3 BP: NORMAL MMHG
STRESS STAGE 3 DURATION: 1 MIN:SEC
STRESS STAGE 3 HR: 84 BPM
STRESS STAGE RECOVERY 1 BP: NORMAL MMHG
STRESS STAGE RECOVERY 1 DURATION: 1 MIN:SEC
STRESS STAGE RECOVERY 1 HR: 83 BPM
STRESS STAGE RECOVERY 2 BP: NORMAL MMHG
STRESS STAGE RECOVERY 2 DURATION: 1 MIN:SEC
STRESS STAGE RECOVERY 2 HR: 82 BPM
STRESS STAGE RECOVERY 3 BP: NORMAL MMHG
STRESS STAGE RECOVERY 3 DURATION: 1 MIN:SEC
STRESS STAGE RECOVERY 3 HR: 80 BPM
STRESS STAGE RECOVERY 4 BP: NORMAL MMHG
STRESS STAGE RECOVERY 4 DURATION: 2 MIN:SEC
STRESS STAGE RECOVERY 4 HR: 78 BPM
STRESS TARGET HR: 163 BPM

## 2024-12-06 PROCEDURE — 99232 SBSQ HOSP IP/OBS MODERATE 35: CPT | Performed by: INTERNAL MEDICINE

## 2024-12-06 PROCEDURE — 78452 HT MUSCLE IMAGE SPECT MULT: CPT | Performed by: NUCLEAR MEDICINE

## 2024-12-06 PROCEDURE — 78452 HT MUSCLE IMAGE SPECT MULT: CPT

## 2024-12-06 PROCEDURE — 93017 CV STRESS TEST TRACING ONLY: CPT

## 2024-12-06 PROCEDURE — 6370000000 HC RX 637 (ALT 250 FOR IP): Performed by: PHYSICIAN ASSISTANT

## 2024-12-06 PROCEDURE — 2140000000 HC CCU INTERMEDIATE R&B

## 2024-12-06 PROCEDURE — 3430000000 HC RX DIAGNOSTIC RADIOPHARMACEUTICAL: Performed by: NUCLEAR MEDICINE

## 2024-12-06 PROCEDURE — 36415 COLL VENOUS BLD VENIPUNCTURE: CPT

## 2024-12-06 PROCEDURE — 6360000002 HC RX W HCPCS: Performed by: PHYSICIAN ASSISTANT

## 2024-12-06 PROCEDURE — 93016 CV STRESS TEST SUPVJ ONLY: CPT | Performed by: NUCLEAR MEDICINE

## 2024-12-06 PROCEDURE — 6370000000 HC RX 637 (ALT 250 FOR IP): Performed by: PSYCHIATRY & NEUROLOGY

## 2024-12-06 PROCEDURE — 6370000000 HC RX 637 (ALT 250 FOR IP): Performed by: INTERNAL MEDICINE

## 2024-12-06 PROCEDURE — 93306 TTE W/DOPPLER COMPLETE: CPT

## 2024-12-06 PROCEDURE — A9500 TC99M SESTAMIBI: HCPCS | Performed by: NUCLEAR MEDICINE

## 2024-12-06 PROCEDURE — 6360000002 HC RX W HCPCS: Performed by: STUDENT IN AN ORGANIZED HEALTH CARE EDUCATION/TRAINING PROGRAM

## 2024-12-06 PROCEDURE — 93018 CV STRESS TEST I&R ONLY: CPT | Performed by: NUCLEAR MEDICINE

## 2024-12-06 PROCEDURE — 93306 TTE W/DOPPLER COMPLETE: CPT | Performed by: NUCLEAR MEDICINE

## 2024-12-06 PROCEDURE — 80048 BASIC METABOLIC PNL TOTAL CA: CPT

## 2024-12-06 RX ORDER — SODIUM CHLORIDE 0.9 % (FLUSH) 0.9 %
5-40 SYRINGE (ML) INJECTION PRN
Status: ACTIVE | OUTPATIENT
Start: 2024-12-06 | End: 2024-12-06

## 2024-12-06 RX ORDER — METOPROLOL TARTRATE 1 MG/ML
5 INJECTION, SOLUTION INTRAVENOUS EVERY 5 MIN PRN
Status: ACTIVE | OUTPATIENT
Start: 2024-12-06 | End: 2024-12-06

## 2024-12-06 RX ORDER — TETRAKIS(2-METHOXYISOBUTYLISOCYANIDE)COPPER(I) TETRAFLUOROBORATE 1 MG/ML
31.2 INJECTION, POWDER, LYOPHILIZED, FOR SOLUTION INTRAVENOUS
Status: COMPLETED | OUTPATIENT
Start: 2024-12-06 | End: 2024-12-06

## 2024-12-06 RX ORDER — REGADENOSON 0.08 MG/ML
0.4 INJECTION, SOLUTION INTRAVENOUS
Status: COMPLETED | OUTPATIENT
Start: 2024-12-06 | End: 2024-12-06

## 2024-12-06 RX ORDER — SODIUM CHLORIDE 9 MG/ML
500 INJECTION, SOLUTION INTRAVENOUS CONTINUOUS PRN
Status: ACTIVE | OUTPATIENT
Start: 2024-12-06 | End: 2024-12-06

## 2024-12-06 RX ORDER — ALBUTEROL SULFATE 90 UG/1
2 INHALANT RESPIRATORY (INHALATION) PRN
Status: ACTIVE | OUTPATIENT
Start: 2024-12-06 | End: 2024-12-06

## 2024-12-06 RX ORDER — HYDRALAZINE HYDROCHLORIDE 50 MG/1
50 TABLET, FILM COATED ORAL EVERY 8 HOURS
Status: DISCONTINUED | OUTPATIENT
Start: 2024-12-06 | End: 2024-12-08 | Stop reason: HOSPADM

## 2024-12-06 RX ORDER — TETRAKIS(2-METHOXYISOBUTYLISOCYANIDE)COPPER(I) TETRAFLUOROBORATE 1 MG/ML
9.2 INJECTION, POWDER, LYOPHILIZED, FOR SOLUTION INTRAVENOUS
Status: COMPLETED | OUTPATIENT
Start: 2024-12-06 | End: 2024-12-06

## 2024-12-06 RX ORDER — REGADENOSON 0.08 MG/ML
0.4 INJECTION, SOLUTION INTRAVENOUS
Status: DISCONTINUED | OUTPATIENT
Start: 2024-12-06 | End: 2024-12-06

## 2024-12-06 RX ORDER — NITROGLYCERIN 0.4 MG/1
0.4 TABLET SUBLINGUAL EVERY 5 MIN PRN
Status: ACTIVE | OUTPATIENT
Start: 2024-12-06 | End: 2024-12-06

## 2024-12-06 RX ORDER — ATROPINE SULFATE 0.1 MG/ML
0.5 INJECTION INTRAVENOUS EVERY 5 MIN PRN
Status: ACTIVE | OUTPATIENT
Start: 2024-12-06 | End: 2024-12-06

## 2024-12-06 RX ORDER — AMINOPHYLLINE 25 MG/ML
50 INJECTION, SOLUTION INTRAVENOUS PRN
Status: ACTIVE | OUTPATIENT
Start: 2024-12-06 | End: 2024-12-06

## 2024-12-06 RX ADMIN — DOXAZOSIN 4 MG: 4 TABLET ORAL at 08:31

## 2024-12-06 RX ADMIN — CLONIDINE HYDROCHLORIDE 0.3 MG: 0.2 TABLET ORAL at 21:00

## 2024-12-06 RX ADMIN — CALCITRIOL CAPSULES 0.25 MCG 0.5 MCG: 0.25 CAPSULE ORAL at 08:30

## 2024-12-06 RX ADMIN — ATORVASTATIN CALCIUM 40 MG: 40 TABLET, FILM COATED ORAL at 21:00

## 2024-12-06 RX ADMIN — Medication 100 MG: at 08:30

## 2024-12-06 RX ADMIN — ONDANSETRON 4 MG: 2 INJECTION INTRAMUSCULAR; INTRAVENOUS at 01:37

## 2024-12-06 RX ADMIN — CLONIDINE HYDROCHLORIDE 0.3 MG: 0.2 TABLET ORAL at 13:54

## 2024-12-06 RX ADMIN — CINACALCET HYDROCHLORIDE 90 MG: 30 TABLET, FILM COATED ORAL at 08:30

## 2024-12-06 RX ADMIN — HEPARIN SODIUM 5000 UNITS: 5000 INJECTION INTRAVENOUS; SUBCUTANEOUS at 06:39

## 2024-12-06 RX ADMIN — HYDRALAZINE HYDROCHLORIDE 50 MG: 50 TABLET ORAL at 13:54

## 2024-12-06 RX ADMIN — AMOXICILLIN AND CLAVULANATE POTASSIUM 1 TABLET: 500; 125 TABLET, FILM COATED ORAL at 08:30

## 2024-12-06 RX ADMIN — ASPIRIN 81 MG: 81 TABLET, COATED ORAL at 08:30

## 2024-12-06 RX ADMIN — DOXAZOSIN 4 MG: 4 TABLET ORAL at 21:00

## 2024-12-06 RX ADMIN — HEPARIN SODIUM 5000 UNITS: 5000 INJECTION INTRAVENOUS; SUBCUTANEOUS at 13:54

## 2024-12-06 RX ADMIN — ISOSORBIDE MONONITRATE 120 MG: 60 TABLET, EXTENDED RELEASE ORAL at 21:00

## 2024-12-06 RX ADMIN — SERTRALINE 150 MG: 100 TABLET, FILM COATED ORAL at 08:30

## 2024-12-06 RX ADMIN — REGADENOSON 0.4 MG: 0.08 INJECTION, SOLUTION INTRAVENOUS at 15:24

## 2024-12-06 RX ADMIN — ISOSORBIDE MONONITRATE 120 MG: 60 TABLET, EXTENDED RELEASE ORAL at 08:31

## 2024-12-06 RX ADMIN — PANTOPRAZOLE SODIUM 40 MG: 40 TABLET, DELAYED RELEASE ORAL at 06:39

## 2024-12-06 RX ADMIN — CLONIDINE HYDROCHLORIDE 0.3 MG: 0.2 TABLET ORAL at 08:31

## 2024-12-06 RX ADMIN — HEPARIN SODIUM 5000 UNITS: 5000 INJECTION INTRAVENOUS; SUBCUTANEOUS at 21:00

## 2024-12-06 RX ADMIN — Medication 9.2 MILLICURIE: at 14:15

## 2024-12-06 RX ADMIN — Medication 31.2 MILLICURIE: at 15:24

## 2024-12-06 NOTE — CARE COORDINATION
12/6/24, 9:03 AM EST    DISCHARGE ON GOING EVALUATION    Trav Jennings       [facility] day: 2  Location: [address] [place] [address] Reason for admit: ESRD (end stage renal disease) (HCC) [N18.6]  Uncontrolled hypertension [I10]  Depression, unspecified depression type [F32.A]  Chest pain, unspecified type [R07.9]     Procedures:   12/6 Echo: ordered.   12/6 Lexiscan stress: ordered.     Imaging since last note: None    Barriers to Discharge: Hospitalist, Cardiology and Psych following. Cardiology planning echo and stress test today. BUN 30 creatinine 6.2. Heparin subq q8hr. Ativan iv prn. O2 on at 3L/nc, sats 100%.     PCP: Radha Hou, WADE - CNP  Readmission Risk Score: 23.7    Patient Goals/Plan/Treatment Preferences: Plans home with wife. [facility], TRS at 0650. Has home O2 concentrator, last order was for 3L ATC.     Possible weekend discharge:    12/6/24, 9:14 AM EST    Patient goals/plan/ treatment preferences discussed by  and .  Patient goals/plan/ treatment preferences reviewed with patient/ family.  Patient/ family verbalize understanding of discharge plan and are in agreement with goal/plan/treatment preferences.  Understanding was demonstrated using the teach back method.  AVS provided by RN at time of discharge, which includes all necessary medical information pertaining to the patients current course of illness, treatment, post-discharge goals of care, and treatment preferences.     Services At/After Discharge: None

## 2024-12-07 PROCEDURE — 6370000000 HC RX 637 (ALT 250 FOR IP): Performed by: PHYSICIAN ASSISTANT

## 2024-12-07 PROCEDURE — 99232 SBSQ HOSP IP/OBS MODERATE 35: CPT | Performed by: INTERNAL MEDICINE

## 2024-12-07 PROCEDURE — 90935 HEMODIALYSIS ONE EVALUATION: CPT

## 2024-12-07 PROCEDURE — 99232 SBSQ HOSP IP/OBS MODERATE 35: CPT | Performed by: NURSE PRACTITIONER

## 2024-12-07 PROCEDURE — 2140000000 HC CCU INTERMEDIATE R&B

## 2024-12-07 PROCEDURE — 6370000000 HC RX 637 (ALT 250 FOR IP): Performed by: PSYCHIATRY & NEUROLOGY

## 2024-12-07 PROCEDURE — 90935 HEMODIALYSIS ONE EVALUATION: CPT | Performed by: INTERNAL MEDICINE

## 2024-12-07 PROCEDURE — 6370000000 HC RX 637 (ALT 250 FOR IP): Performed by: NURSE PRACTITIONER

## 2024-12-07 PROCEDURE — 6370000000 HC RX 637 (ALT 250 FOR IP): Performed by: INTERNAL MEDICINE

## 2024-12-07 PROCEDURE — 2580000003 HC RX 258: Performed by: PHYSICIAN ASSISTANT

## 2024-12-07 PROCEDURE — 6360000002 HC RX W HCPCS: Performed by: PHYSICIAN ASSISTANT

## 2024-12-07 RX ORDER — ISOSORBIDE MONONITRATE 60 MG/1
60 TABLET, EXTENDED RELEASE ORAL 2 TIMES DAILY
Status: DISCONTINUED | OUTPATIENT
Start: 2024-12-07 | End: 2024-12-08 | Stop reason: HOSPADM

## 2024-12-07 RX ADMIN — ATORVASTATIN CALCIUM 40 MG: 40 TABLET, FILM COATED ORAL at 20:10

## 2024-12-07 RX ADMIN — CLONIDINE HYDROCHLORIDE 0.3 MG: 0.2 TABLET ORAL at 14:07

## 2024-12-07 RX ADMIN — SODIUM CHLORIDE, PRESERVATIVE FREE 10 ML: 5 INJECTION INTRAVENOUS at 08:18

## 2024-12-07 RX ADMIN — SODIUM CHLORIDE, PRESERVATIVE FREE 10 ML: 5 INJECTION INTRAVENOUS at 20:12

## 2024-12-07 RX ADMIN — Medication 100 MG: at 14:07

## 2024-12-07 RX ADMIN — ASPIRIN 81 MG: 81 TABLET, COATED ORAL at 14:06

## 2024-12-07 RX ADMIN — ISOSORBIDE MONONITRATE 120 MG: 60 TABLET, EXTENDED RELEASE ORAL at 14:08

## 2024-12-07 RX ADMIN — HEPARIN SODIUM 5000 UNITS: 5000 INJECTION INTRAVENOUS; SUBCUTANEOUS at 14:09

## 2024-12-07 RX ADMIN — PANTOPRAZOLE SODIUM 40 MG: 40 TABLET, DELAYED RELEASE ORAL at 06:32

## 2024-12-07 RX ADMIN — HEPARIN SODIUM 5000 UNITS: 5000 INJECTION INTRAVENOUS; SUBCUTANEOUS at 20:11

## 2024-12-07 RX ADMIN — DOXAZOSIN 4 MG: 4 TABLET ORAL at 14:07

## 2024-12-07 RX ADMIN — CLONIDINE HYDROCHLORIDE 0.3 MG: 0.2 TABLET ORAL at 20:10

## 2024-12-07 RX ADMIN — HEPARIN SODIUM 5000 UNITS: 5000 INJECTION INTRAVENOUS; SUBCUTANEOUS at 06:32

## 2024-12-07 RX ADMIN — SERTRALINE 150 MG: 100 TABLET, FILM COATED ORAL at 14:08

## 2024-12-07 RX ADMIN — ISOSORBIDE MONONITRATE 60 MG: 60 TABLET, EXTENDED RELEASE ORAL at 20:10

## 2024-12-07 RX ADMIN — HYDRALAZINE HYDROCHLORIDE 50 MG: 50 TABLET ORAL at 06:32

## 2024-12-07 RX ADMIN — DOXAZOSIN 6 MG: 4 TABLET ORAL at 20:17

## 2024-12-07 NOTE — CONSULTS
[address] [address]                              CONSULTATION      PATIENT NAME: HERON JAIN                : 1965  MED REC NO: [id number]                       ROOM: [facility]  ACCOUNT NO: [id number]                       ADMIT DATE: 2024  PROVIDER: Ian Holm MD      CONSULT DATE: 2024    Consult to Michael Pagan.    REASON FOR CONSULT:  Depression with active suicidal ideation.    SOURCE OF INFORMATION:  The patient and electronic medical record.    IDENTIFYING INFORMATION:  The patient is a 59-year-old   male.  He is the father of 2 daughters.  He lives with his wife.  He is disabled.    HISTORY OF PRESENT ILLNESS:  The patient presented to [facility] due to chest pain.  He has a history of multiple medical issues.  He was admitted for further management.  He has a long history of depression and illicit drug use and alcohol.  He reports that he was sober from illicit drugs for about 5-6 months and he relapsed 3 weeks ago.  He says he is using between 300-600 dollars worth of cocaine per week and he is drinking alcohol 3-4 times a week, 1 pint of hard liquor and two 24 ounces of beer.  Since relapse on alcohol and cocaine, he has been feeling more depressed.  He is having suicidal thoughts without any specific plan to harm himself.  He feels hopeless and worthless.  He feels that he cannot control his alcohol and illicit drug use.  He was on the psychiatric unit last spring, and I made some changes on his psychotropics.  He says he has been taking his psychotropics on and off, but unfortunately his psychotropics were not resumed upon admission.    PAST PSYCHIATRIC, FAMILY, AND SOCIAL HISTORY:  See consult on 2024.  The only difference he relapsed on alcohol and crack cocaine about 3 weeks ago.  He also reports this time he is living with his wife.    PAST MEDICAL AND 
Brief Intervention and Referral to Treatment Summary    Patient was provided PHQ-9, AUDIT-C and DAST Screening:      PHQ-9 Score: 15  AUDIT-C Score:  10  DAST Score:  7    Patient’s substance use is considered     Dependent      Patient’s depression is considered:     Moderate    Brief Education Was Provided    Patient was receptive      Brief Intervention Is Provided (Only for AUDIT-C or DAST)     Patient reports readiness to decrease and/or stop use and a plan was discussed   
[facility]    [address], [place] [address]  [phone]     Hospital Consult  12/4/2024 4:45 PM    Pt Name:    Trav Jennings  MRN:     [id number]   [id number]  YOB: 1965  Admit Date:    12/4/2024 10:00 AM  Primary Care Physician:  Radha Hou APRN - CNP        Reason for Consult:  ESRD    History:   The patient is a 59 y.o. male seen in renal consult for ESRD. [facility] under the care of Dr. Ayon.  Has additional hx of HTN, HLD, HFpEF, hx alcohol and cocaine use, and as below. He presented to the hospital with chest pain.  He has been using cocaine again and drinking alcohol.  He missed dialysis on Tuesday. BP noted to be significantly elevated.  CXR showed pulmonary edema.  Potassium elevated at 5.6.  nephrology consulted for his dialysis management.    Past Medical History:  Past Medical History:   Diagnosis Date    AAA (abdominal aortic aneurysm) ([facility])     NURIS (acute kidney injury) ([facility]) 9/24/2015    Anemia associated with chronic renal failure     Arthritis     stated in hands    Cocaine abuse ([facility]) 5/10/2014    Depression     Diabetes mellitus ([facility])     pt states he no longer has diabetes he has lost alot of davion.     FSGS (focal segmental glomerulosclerosis) 5/23/2013    Hemodialysis patient ([facility]) 10/17/2016    on hemodialysis with [facility]    Hemorrhoids 1/16/2012    History of blood transfusion     Hyperlipidemia     Hyperphosphatemia 5/21/2016    Hypertension     Left renal artery stenosis ([facility]) 5/22/2014    Monoclonal (M) protein disease, multiple 'M' protein     Nicotine dependence 6/16/2014    Noncompliance     Pneumonia     Psychiatric problem     PTSD (post-traumatic stress disorder)     Pt [facility] from [facility]    Secondary hyperparathyroidism (of renal origin)     Sleep apnea        Past Surgical History:  Past Surgical History:   Procedure Laterality Date    ABDOMINAL AORTIC ANEURYSM 
Patient interviewed  Meds and chart reviewed  Orders written  Yes  Full consult will be dictated  Thanks for the consult.  
\"CKMBINDEX\", \"TROPONINI\" in the last 72 hours.  PT/INR: No results for input(s): \"PROTIME\", \"INR\" in the last 72 hours.  APTT: No results for input(s): \"APTT\" in the last 72 hours.  Liver Profile:  Lab Results   Component Value Date/Time    AST 19 12/04/2024 10:43 AM    ALT 10 12/04/2024 10:43 AM    BILIDIR <0.1 12/04/2024 10:43 AM    BILITOT 0.4 12/04/2024 10:43 AM    ALKPHOS 111 12/04/2024 10:43 AM    PROTEIN 7.7 12/04/2024 10:43 AM     Lab Results   Component Value Date/Time    CHOL 142 11/09/2016 06:16 AM    HDL 78 11/09/2016 06:16 AM    TRIG 44 11/09/2016 06:16 AM     TSH:   Lab Results   Component Value Date/Time    TSH 1.600 04/03/2018 08:12 PM     UA:   Lab Results   Component Value Date/Time    COLORU RED 09/28/2022 04:25 PM    COLORU YELLOW 04/03/2018 07:45 PM    PHUR 8.0 04/03/2018 07:45 PM    LABCAST NONE SEEN 03/07/2018 09:10 PM    LABCAST NONE SEEN 03/07/2018 09:10 PM    WBCUA 2-4 04/03/2018 07:45 PM    RBCUA 5-10 04/03/2018 07:45 PM    YEAST NONE SEEN 04/03/2018 07:45 PM    BACTERIA NONE 04/03/2018 07:45 PM    LEUKOCYTESUR NEGATIVE 04/03/2018 07:45 PM    UROBILINOGEN 0.2 04/03/2018 07:45 PM    BILIRUBINUR NEGATIVE 04/03/2018 07:45 PM    BLOODU SMALL 04/03/2018 07:45 PM    GLUCOSEU NEGATIVE 04/03/2018 07:45 PM    AMORPHOUS URATES 01/10/2015 03:28 AM         Physical Exam:  Vitals:    12/05/24 0737   BP: (!) 140/80   Pulse: 85   Resp: 15   Temp: 98.3 °F (36.8 °C)   SpO2: 97%      Intake/Output Summary (Last 24 hours) at 12/5/2024 0938  Last data filed at 12/5/2024 0326  Gross per 24 hour   Intake 800 ml   Output 4900 ml   Net -4100 ml      General:  No acute distress  Neck: Supple, no JVD, no carotid bruits  Heart: Regular rhythm normal S1 and S2, no rubs, murmurs or gallops  Lungs: clear to ascultation no rales, wheezes, or rhonchi  Abdomen: positive bowel sounds, soft, non-tender, non-distended, no bruits, no masses  Extremities:no clubbing, cyanosis or edema  Neurologic: alert and oriented x 3,

## 2024-12-07 NOTE — PLAN OF CARE
Problem: Risk for Elopement  Goal: Patient will not exit the unit/facility without proper excort  Outcome: Progressing  Flowsheets  Taken 12/6/2024 2248  Nursing Interventions for Elopement Risk:   Collaborate with family members/caregivers to mitigate the elopement risk   Collaborate with treatment team for drug withdrawal symptoms treatment   Assist with personal care needs such as toileting, eating, dressing, as needed to reduce the risk of wandering  Taken 12/6/2024 2108  Nursing Interventions for Elopement Risk: Assist with personal care needs such as toileting, eating, dressing, as needed to reduce the risk of wandering     Problem: Self Harm/Suicidality  Goal: Will have no self-injury during hospital stay  Description: INTERVENTIONS:  1.  Ensure constant observer at bedside with Q15M safety checks  2.  Maintain a safe environment  3.  Secure patient belongings  4.  Ensure family/visitors adhere to safety recommendations  5.  Ensure safety tray has been added to patient's diet order  6.  Every shift and PRN: Re-assess suicidal risk via Frequent Screener    Outcome: Progressing  Flowsheets (Taken 12/6/2024 2250)  Will have no self-injury during hospital stay:   Ensure constant observer at bedside with Q15M safety checks   Secure patient belongings   Maintain a safe environment   Ensure family/visitors adhere to safety recommendations    Care plan reviewed with patient.  Patient verbalizes understanding of the care plan and contributed to goal setting.

## 2024-12-07 NOTE — FLOWSHEET NOTE
12/07/24 0848 12/07/24 1315   Vital Signs   BP (!) 161/82 (!) 178/94   Temp 97.9 °F (36.6 °C) 97.1 °F (36.2 °C)   Pulse 75 78   Respirations 15 18   SpO2 96 % 97 %   Weight - Scale 98.5 kg (217 lb 2.5 oz) 96 kg (211 lb 10.3 oz)   Weight Method Bed scale Bed scale   Percent Weight Change 0 -2.54   Post-Hemodialysis Assessment   Post-Treatment Procedures  --  Blood returned;Access bleeding time < 10 minutes   Machine Disinfection Process  --  Acid/Vinegar Clean;Heat Disinfect;Exterior Machine Disinfection   Blood Volume Processed (Liters)  --  117.5 L   Dialyzer Clearance  --  Lightly streaked   Duration of Treatment (minutes)  --  240 minutes   Hemodialysis Intake (ml)  --  400 ml   Hemodialysis Output (ml)  --  2900 ml   NET Removed (ml)  --  2500   Tolerated Treatment  --  Good     Stable 4 hour TX completed. Removed 2.5 L of fluid, tolerated fluid removal well. Pressure held to each needle site x 10 min. Drsg clean, dry, and intact upon leaving unit. TX record printed for scanning into EMR. Report called to primary RN.

## 2024-12-08 VITALS
HEIGHT: 75 IN | SYSTOLIC BLOOD PRESSURE: 142 MMHG | BODY MASS INDEX: 26.32 KG/M2 | RESPIRATION RATE: 16 BRPM | WEIGHT: 211.64 LBS | DIASTOLIC BLOOD PRESSURE: 68 MMHG | HEART RATE: 84 BPM | OXYGEN SATURATION: 97 % | TEMPERATURE: 97.6 F

## 2024-12-08 LAB
ANION GAP SERPL CALC-SCNC: 12 MEQ/L (ref 8–16)
BUN SERPL-MCNC: 30 MG/DL (ref 7–22)
CALCIUM SERPL-MCNC: 8.1 MG/DL (ref 8.5–10.5)
CHLORIDE SERPL-SCNC: 104 MEQ/L (ref 98–111)
CO2 SERPL-SCNC: 24 MEQ/L (ref 23–33)
CREAT SERPL-MCNC: 6.4 MG/DL (ref 0.4–1.2)
DEPRECATED RDW RBC AUTO: 52.8 FL (ref 35–45)
ERYTHROCYTE [DISTWIDTH] IN BLOOD BY AUTOMATED COUNT: 13.7 % (ref 11.5–14.5)
GFR SERPL CREATININE-BSD FRML MDRD: 9 ML/MIN/1.73M2
GLUCOSE SERPL-MCNC: 91 MG/DL (ref 70–108)
HCT VFR BLD AUTO: 35.8 % (ref 42–52)
HGB BLD-MCNC: 11 GM/DL (ref 14–18)
MCH RBC QN AUTO: 32.4 PG (ref 26–33)
MCHC RBC AUTO-ENTMCNC: 30.7 GM/DL (ref 32.2–35.5)
MCV RBC AUTO: 105.3 FL (ref 80–94)
PLATELET # BLD AUTO: 113 THOU/MM3 (ref 130–400)
PMV BLD AUTO: 10.8 FL (ref 9.4–12.4)
POTASSIUM SERPL-SCNC: 5 MEQ/L (ref 3.5–5.2)
RBC # BLD AUTO: 3.4 MILL/MM3 (ref 4.7–6.1)
SODIUM SERPL-SCNC: 140 MEQ/L (ref 135–145)
WBC # BLD AUTO: 3.9 THOU/MM3 (ref 4.8–10.8)

## 2024-12-08 PROCEDURE — 99232 SBSQ HOSP IP/OBS MODERATE 35: CPT | Performed by: INTERNAL MEDICINE

## 2024-12-08 PROCEDURE — 6370000000 HC RX 637 (ALT 250 FOR IP): Performed by: INTERNAL MEDICINE

## 2024-12-08 PROCEDURE — 99232 SBSQ HOSP IP/OBS MODERATE 35: CPT | Performed by: NURSE PRACTITIONER

## 2024-12-08 PROCEDURE — 6370000000 HC RX 637 (ALT 250 FOR IP): Performed by: PSYCHIATRY & NEUROLOGY

## 2024-12-08 PROCEDURE — 2580000003 HC RX 258: Performed by: PHYSICIAN ASSISTANT

## 2024-12-08 PROCEDURE — 6370000000 HC RX 637 (ALT 250 FOR IP): Performed by: NURSE PRACTITIONER

## 2024-12-08 PROCEDURE — 6370000000 HC RX 637 (ALT 250 FOR IP): Performed by: PHYSICIAN ASSISTANT

## 2024-12-08 PROCEDURE — 80048 BASIC METABOLIC PNL TOTAL CA: CPT

## 2024-12-08 PROCEDURE — 36415 COLL VENOUS BLD VENIPUNCTURE: CPT

## 2024-12-08 PROCEDURE — 6360000002 HC RX W HCPCS: Performed by: PHYSICIAN ASSISTANT

## 2024-12-08 PROCEDURE — 85027 COMPLETE CBC AUTOMATED: CPT

## 2024-12-08 PROCEDURE — 99239 HOSP IP/OBS DSCHRG MGMT >30: CPT | Performed by: INTERNAL MEDICINE

## 2024-12-08 RX ORDER — SULFAMETHOXAZOLE AND TRIMETHOPRIM 800; 160 MG/1; MG/1
1 TABLET ORAL 2 TIMES DAILY
Qty: 6 TABLET | Refills: 0 | Status: SHIPPED | OUTPATIENT
Start: 2024-12-08 | End: 2024-12-11

## 2024-12-08 RX ORDER — SULFAMETHOXAZOLE AND TRIMETHOPRIM 800; 160 MG/1; MG/1
1 TABLET ORAL 2 TIMES DAILY
Qty: 6 TABLET | Refills: 0 | Status: SHIPPED | OUTPATIENT
Start: 2024-12-08 | End: 2024-12-08

## 2024-12-08 RX ORDER — SULFAMETHOXAZOLE AND TRIMETHOPRIM 800; 160 MG/1; MG/1
1 TABLET ORAL ONCE
Status: DISCONTINUED | OUTPATIENT
Start: 2024-12-08 | End: 2024-12-08 | Stop reason: HOSPADM

## 2024-12-08 RX ORDER — LANOLIN ALCOHOL/MO/W.PET/CERES
100 CREAM (GRAM) TOPICAL DAILY
Qty: 30 TABLET | Refills: 3 | Status: SHIPPED | OUTPATIENT
Start: 2024-12-09 | End: 2024-12-08

## 2024-12-08 RX ORDER — LANOLIN ALCOHOL/MO/W.PET/CERES
100 CREAM (GRAM) TOPICAL DAILY
Qty: 30 TABLET | Refills: 3 | Status: SHIPPED | OUTPATIENT
Start: 2024-12-09

## 2024-12-08 RX ADMIN — HEPARIN SODIUM 5000 UNITS: 5000 INJECTION INTRAVENOUS; SUBCUTANEOUS at 06:00

## 2024-12-08 RX ADMIN — SERTRALINE 150 MG: 100 TABLET, FILM COATED ORAL at 08:12

## 2024-12-08 RX ADMIN — SODIUM CHLORIDE, PRESERVATIVE FREE 10 ML: 5 INJECTION INTRAVENOUS at 08:12

## 2024-12-08 RX ADMIN — CLONIDINE HYDROCHLORIDE 0.3 MG: 0.2 TABLET ORAL at 08:12

## 2024-12-08 RX ADMIN — DOXAZOSIN 6 MG: 4 TABLET ORAL at 08:12

## 2024-12-08 RX ADMIN — Medication 100 MG: at 08:12

## 2024-12-08 RX ADMIN — ISOSORBIDE MONONITRATE 60 MG: 60 TABLET, EXTENDED RELEASE ORAL at 08:12

## 2024-12-08 RX ADMIN — PANTOPRAZOLE SODIUM 40 MG: 40 TABLET, DELAYED RELEASE ORAL at 06:00

## 2024-12-08 RX ADMIN — HYDRALAZINE HYDROCHLORIDE 50 MG: 50 TABLET ORAL at 06:00

## 2024-12-08 RX ADMIN — ASPIRIN 81 MG: 81 TABLET, COATED ORAL at 08:12

## 2024-12-08 NOTE — PLAN OF CARE
Problem: Chronic Conditions and Co-morbidities  Goal: Patient's chronic conditions and co-morbidity symptoms are monitored and maintained or improved  Outcome: Progressing  Flowsheets (Taken 12/8/2024 1218)  Care Plan - Patient's Chronic Conditions and Co-Morbidity Symptoms are Monitored and Maintained or Improved: Monitor and assess patient's chronic conditions and comorbid symptoms for stability, deterioration, or improvement     Problem: Discharge Planning  Goal: Discharge to home or other facility with appropriate resources  Outcome: Progressing  Flowsheets (Taken 12/8/2024 1218)  Discharge to home or other facility with appropriate resources:   Identify barriers to discharge with patient and caregiver   Identify discharge learning needs (meds, wound care, etc)     Problem: Safety - Adult  Goal: Free from fall injury  Outcome: Progressing  Flowsheets (Taken 12/8/2024 1218)  Free From Fall Injury: Instruct family/caregiver on patient safety     Problem: Risk for Elopement  Goal: Patient will not exit the unit/facility without proper excort  Outcome: Progressing     Problem: Self Harm/Suicidality  Goal: Will have no self-injury during hospital stay  Description: INTERVENTIONS:  1.  Ensure constant observer at bedside with Q15M safety checks  2.  Maintain a safe environment  3.  Secure patient belongings  4.  Ensure family/visitors adhere to safety recommendations  5.  Ensure safety tray has been added to patient's diet order  6.  Every shift and PRN: Re-assess suicidal risk via Frequent Screener    Outcome: Progressing   Care plan reviewed with patient.  Patient verbalizes understanding of the care plan and contributed to goal setting.

## 2024-12-08 NOTE — DISCHARGE INSTRUCTIONS
Follow-up in the Cardiology office in 2 - 3 weeks - our office will call you to make the appointment.     Continue the IMDUR at 60 mg every night.     Continue the Cardura as 6 mg in the morning and again at night.   This should control your blood pressure better.     Call our office if not feeling well with these changes.  [phone]- use Option #3.

## 2024-12-08 NOTE — DISCHARGE SUMMARY
Discharge Summary    Date: 12/8/2024  Patient Name: Trav Jennings    YOB: 1965     Age: 59 y.o.    Admit Date: 12/4/2024  Discharge Date: 12/8/2024  Discharge Condition: Good    Admission Diagnosis  ESRD (end stage renal disease) ([facility]) [N18.6];Uncontrolled hypertension [I10];Depression, unspecified depression type [F32.A];Chest pain, unspecified type [R07.9]      Discharge Diagnosis  Principal Problem:    ESRD (end stage renal disease) ([facility])  Resolved Problems:    * No resolved hospital problems. *      Hospital Stay  Narrative of Hospital Course:  Trav Jennings is a 59 y.o. male with a history of end-stage renal disease on hemodialysis, hypertension, hyperlipidemia, non-insulin-dependent type 2 diabetes mellitus, cocaine abuse, alcohol abuse, nicotine dependence, anemia, depression, insomnia, and LVOT obstruction who presented to [facility] with chief complaint of chest pain.  The patient reports over the last couple of days he has been having chest pain.  He reports relapsing on cocaine and is also been drinking alcohol again.  He last used cocaine this morning and last had a drink this morning as well.  He reports drinking approximately 2 24 ounce high alcohol beers 3 times a week.  He does report a history of alcohol withdrawal.  He reports he continues to have some chest tightness.  In the emergency department, his blood pressure was significantly elevated in the 200s.  He was also placed on 4 L nasal cannula.  He reports only being on oxygen when he misses his dialysis.  His last dialysis treatment was Saturday and he missed Tuesday.  He reports he has not been taking his blood pressure medications.  He has had episodes in the past of chest pain which has resolved with dialysis and lowering of his blood pressure.  He also does report some shortness of breath as well as some epigastric pain.  No significant nausea or vomiting.  The patient also does report some boils in his buttocks region and reports
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
## 2024-12-09 ENCOUNTER — TELEPHONE (OUTPATIENT)
Dept: CARDIOLOGY CLINIC | Age: 59
End: 2024-12-09

## 2024-12-09 NOTE — TELEPHONE ENCOUNTER
----- Message from WADE Moreno - CNP sent at 12/8/2024  5:11 PM EST -----  Regarding: follow-up appointment  Patient needs follow-up appointment in 2 - 3 weeks - not sure who he has been seeing - think he may have had appointment with Jude that he no-showed for sometime back. Please call him and set up the appointment - hospital follow-up.  Thank you!  natalya

## 2024-12-23 ENCOUNTER — HOSPITAL ENCOUNTER (INPATIENT)
Age: 59
LOS: 4 days | Discharge: HOME | End: 2024-12-27
Attending: EMERGENCY MEDICINE
Payer: MEDICARE

## 2024-12-23 ENCOUNTER — APPOINTMENT (OUTPATIENT)
Dept: GENERAL RADIOLOGY | Age: 59
End: 2024-12-23
Payer: MEDICARE

## 2024-12-23 DIAGNOSIS — R79.89 ELEVATED TROPONIN: ICD-10-CM

## 2024-12-23 DIAGNOSIS — I48.91 ATRIAL FIBRILLATION WITH RVR (HCC): Primary | ICD-10-CM

## 2024-12-23 DIAGNOSIS — I48.92 ATRIAL FLUTTER, UNSPECIFIED TYPE (HCC): ICD-10-CM

## 2024-12-23 DIAGNOSIS — I48.19 PERSISTENT ATRIAL FIBRILLATION (HCC): ICD-10-CM

## 2024-12-23 DIAGNOSIS — Z86.79 HISTORY OF ATRIAL FLUTTER: ICD-10-CM

## 2024-12-23 LAB
ALBUMIN SERPL BCG-MCNC: 3.9 G/DL (ref 3.5–5.1)
ALP SERPL-CCNC: 97 U/L (ref 38–126)
ALT SERPL W/O P-5'-P-CCNC: 12 U/L (ref 11–66)
ANION GAP SERPL CALC-SCNC: 12 MEQ/L (ref 8–16)
ANION GAP SERPL CALC-SCNC: 9 MEQ/L (ref 8–16)
APTT PPP: 31.4 SECONDS (ref 22–38)
AST SERPL-CCNC: 16 U/L (ref 5–40)
BASOPHILS ABSOLUTE: 0 THOU/MM3 (ref 0–0.1)
BASOPHILS NFR BLD AUTO: 0.4 %
BILIRUB CONJ SERPL-MCNC: < 0.1 MG/DL (ref 0.1–13.8)
BILIRUB SERPL-MCNC: 0.2 MG/DL (ref 0.3–1.2)
BUN SERPL-MCNC: 20 MG/DL (ref 7–22)
BUN SERPL-MCNC: 25 MG/DL (ref 7–22)
CA-I BLD ISE-SCNC: 0.97 MMOL/L (ref 1.12–1.32)
CALCIUM SERPL-MCNC: 8.4 MG/DL (ref 8.5–10.5)
CALCIUM SERPL-MCNC: 8.8 MG/DL (ref 8.5–10.5)
CHLORIDE SERPL-SCNC: 97 MEQ/L (ref 98–111)
CHLORIDE SERPL-SCNC: 98 MEQ/L (ref 98–111)
CO2 SERPL-SCNC: 28 MEQ/L (ref 23–33)
CO2 SERPL-SCNC: 31 MEQ/L (ref 23–33)
CREAT SERPL-MCNC: 3.5 MG/DL (ref 0.4–1.2)
CREAT SERPL-MCNC: 4.7 MG/DL (ref 0.4–1.2)
DEPRECATED RDW RBC AUTO: 51.8 FL (ref 35–45)
EKG ATRIAL RATE: 129 BPM
EKG ATRIAL RATE: 250 BPM
EKG P-R INTERVAL: 80 MS
EKG Q-T INTERVAL: 358 MS
EKG Q-T INTERVAL: 374 MS
EKG Q-T INTERVAL: 376 MS
EKG Q-T INTERVAL: 384 MS
EKG QRS DURATION: 102 MS
EKG QRS DURATION: 104 MS
EKG QRS DURATION: 108 MS
EKG QRS DURATION: 112 MS
EKG QTC CALCULATION (BAZETT): 485 MS
EKG QTC CALCULATION (BAZETT): 506 MS
EKG QTC CALCULATION (BAZETT): 524 MS
EKG QTC CALCULATION (BAZETT): 531 MS
EKG R AXIS: -39 DEGREES
EKG R AXIS: -50 DEGREES
EKG R AXIS: -55 DEGREES
EKG R AXIS: -55 DEGREES
EKG T AXIS: 100 DEGREES
EKG T AXIS: 101 DEGREES
EKG T AXIS: 102 DEGREES
EKG T AXIS: 105 DEGREES
EKG VENTRICULAR RATE: 109 BPM
EKG VENTRICULAR RATE: 121 BPM
EKG VENTRICULAR RATE: 129 BPM
EKG VENTRICULAR RATE: 96 BPM
EOSINOPHIL NFR BLD AUTO: 2.3 %
EOSINOPHILS ABSOLUTE: 0.1 THOU/MM3 (ref 0–0.4)
ERYTHROCYTE [DISTWIDTH] IN BLOOD BY AUTOMATED COUNT: 13.5 % (ref 11.5–14.5)
FLUAV RNA RESP QL NAA+PROBE: NOT DETECTED
FLUBV RNA RESP QL NAA+PROBE: NOT DETECTED
GFR SERPL CREATININE-BSD FRML MDRD: 14 ML/MIN/1.73M2
GFR SERPL CREATININE-BSD FRML MDRD: 19 ML/MIN/1.73M2
GLUCOSE SERPL-MCNC: 111 MG/DL (ref 70–108)
GLUCOSE SERPL-MCNC: 83 MG/DL (ref 70–108)
HCT VFR BLD AUTO: 36.7 % (ref 42–52)
HEPARIN UNFRACTIONATED: 0.06 U/ML (ref 0.3–0.7)
HEPARIN UNFRACTIONATED: 0.07 U/ML (ref 0.3–0.7)
HGB BLD-MCNC: 11.2 GM/DL (ref 14–18)
IMM GRANULOCYTES # BLD AUTO: 0.02 THOU/MM3 (ref 0–0.07)
IMM GRANULOCYTES NFR BLD AUTO: 0.4 %
INR PPP: 0.97 (ref 0.85–1.13)
LACTIC ACID, SEPSIS: 0.4 MMOL/L (ref 0.5–1.9)
LACTIC ACID, SEPSIS: 0.7 MMOL/L (ref 0.5–1.9)
LYMPHOCYTES ABSOLUTE: 0.6 THOU/MM3 (ref 1–4.8)
LYMPHOCYTES NFR BLD AUTO: 11.6 %
MAGNESIUM SERPL-MCNC: 1.4 MG/DL (ref 1.6–2.4)
MAGNESIUM SERPL-MCNC: 2.7 MG/DL (ref 1.6–2.4)
MCH RBC QN AUTO: 31.8 PG (ref 26–33)
MCHC RBC AUTO-ENTMCNC: 30.5 GM/DL (ref 32.2–35.5)
MCV RBC AUTO: 104.3 FL (ref 80–94)
MONOCYTES ABSOLUTE: 0.4 THOU/MM3 (ref 0.4–1.3)
MONOCYTES NFR BLD AUTO: 7 %
NEUTROPHILS ABSOLUTE: 4.4 THOU/MM3 (ref 1.8–7.7)
NEUTROPHILS NFR BLD AUTO: 78.3 %
NRBC BLD AUTO-RTO: 0 /100 WBC
OSMOLALITY SERPL CALC.SUM OF ELEC: 275.6 MOSMOL/KG (ref 275–300)
PHOSPHATE SERPL-MCNC: 2.5 MG/DL (ref 2.4–4.7)
PLATELET # BLD AUTO: 105 THOU/MM3 (ref 130–400)
PMV BLD AUTO: 10.6 FL (ref 9.4–12.4)
POTASSIUM SERPL-SCNC: 3.9 MEQ/L (ref 3.5–5.2)
POTASSIUM SERPL-SCNC: 4.8 MEQ/L (ref 3.5–5.2)
PROT SERPL-MCNC: 7.2 G/DL (ref 6.1–8)
RBC # BLD AUTO: 3.52 MILL/MM3 (ref 4.7–6.1)
SARS-COV-2 RNA RESP QL NAA+PROBE: NOT DETECTED
SODIUM SERPL-SCNC: 137 MEQ/L (ref 135–145)
SODIUM SERPL-SCNC: 138 MEQ/L (ref 135–145)
T4 FREE SERPL-MCNC: 1.06 NG/DL (ref 0.93–1.68)
TROPONIN, HIGH SENSITIVITY: 176 NG/L (ref 0–12)
TROPONIN, HIGH SENSITIVITY: 247 NG/L (ref 0–12)
TSH SERPL DL<=0.005 MIU/L-ACNC: 0.54 UIU/ML (ref 0.4–4.2)
WBC # BLD AUTO: 5.6 THOU/MM3 (ref 4.8–10.8)

## 2024-12-23 PROCEDURE — 85610 PROTHROMBIN TIME: CPT

## 2024-12-23 PROCEDURE — 2580000003 HC RX 258

## 2024-12-23 PROCEDURE — 6370000000 HC RX 637 (ALT 250 FOR IP): Performed by: PHYSICIAN ASSISTANT

## 2024-12-23 PROCEDURE — 93010 ELECTROCARDIOGRAM REPORT: CPT | Performed by: INTERNAL MEDICINE

## 2024-12-23 PROCEDURE — 82248 BILIRUBIN DIRECT: CPT

## 2024-12-23 PROCEDURE — 93005 ELECTROCARDIOGRAM TRACING: CPT

## 2024-12-23 PROCEDURE — 85520 HEPARIN ASSAY: CPT

## 2024-12-23 PROCEDURE — 84100 ASSAY OF PHOSPHORUS: CPT

## 2024-12-23 PROCEDURE — 2500000003 HC RX 250 WO HCPCS

## 2024-12-23 PROCEDURE — 87636 SARSCOV2 & INF A&B AMP PRB: CPT

## 2024-12-23 PROCEDURE — 84439 ASSAY OF FREE THYROXINE: CPT

## 2024-12-23 PROCEDURE — 2140000000 HC CCU INTERMEDIATE R&B

## 2024-12-23 PROCEDURE — 96365 THER/PROPH/DIAG IV INF INIT: CPT

## 2024-12-23 PROCEDURE — 36415 COLL VENOUS BLD VENIPUNCTURE: CPT

## 2024-12-23 PROCEDURE — 82330 ASSAY OF CALCIUM: CPT

## 2024-12-23 PROCEDURE — 99285 EMERGENCY DEPT VISIT HI MDM: CPT

## 2024-12-23 PROCEDURE — 84443 ASSAY THYROID STIM HORMONE: CPT

## 2024-12-23 PROCEDURE — 6360000002 HC RX W HCPCS

## 2024-12-23 PROCEDURE — 80053 COMPREHEN METABOLIC PANEL: CPT

## 2024-12-23 PROCEDURE — 71046 X-RAY EXAM CHEST 2 VIEWS: CPT

## 2024-12-23 PROCEDURE — 84484 ASSAY OF TROPONIN QUANT: CPT

## 2024-12-23 PROCEDURE — 83605 ASSAY OF LACTIC ACID: CPT

## 2024-12-23 PROCEDURE — 85730 THROMBOPLASTIN TIME PARTIAL: CPT

## 2024-12-23 PROCEDURE — 93005 ELECTROCARDIOGRAM TRACING: CPT | Performed by: EMERGENCY MEDICINE

## 2024-12-23 PROCEDURE — 83735 ASSAY OF MAGNESIUM: CPT

## 2024-12-23 PROCEDURE — 96375 TX/PRO/DX INJ NEW DRUG ADDON: CPT

## 2024-12-23 PROCEDURE — 85025 COMPLETE CBC W/AUTO DIFF WBC: CPT

## 2024-12-23 PROCEDURE — 6370000000 HC RX 637 (ALT 250 FOR IP)

## 2024-12-23 RX ORDER — ISOSORBIDE MONONITRATE 60 MG/1
120 TABLET, EXTENDED RELEASE ORAL 2 TIMES DAILY
Status: DISCONTINUED | OUTPATIENT
Start: 2024-12-23 | End: 2024-12-27 | Stop reason: HOSPADM

## 2024-12-23 RX ORDER — CALCIUM GLUCONATE 20 MG/ML
2000 INJECTION, SOLUTION INTRAVENOUS ONCE
Status: COMPLETED | OUTPATIENT
Start: 2024-12-23 | End: 2024-12-23

## 2024-12-23 RX ORDER — METOPROLOL TARTRATE 1 MG/ML
2.5 INJECTION, SOLUTION INTRAVENOUS ONCE
Status: COMPLETED | OUTPATIENT
Start: 2024-12-23 | End: 2024-12-23

## 2024-12-23 RX ORDER — HEPARIN SODIUM 10000 [USP'U]/100ML
5-30 INJECTION, SOLUTION INTRAVENOUS CONTINUOUS
Status: DISCONTINUED | OUTPATIENT
Start: 2024-12-23 | End: 2024-12-26 | Stop reason: SDUPTHER

## 2024-12-23 RX ORDER — HEPARIN SODIUM 1000 [USP'U]/ML
4000 INJECTION, SOLUTION INTRAVENOUS; SUBCUTANEOUS PRN
Status: DISCONTINUED | OUTPATIENT
Start: 2024-12-23 | End: 2024-12-26 | Stop reason: SDUPTHER

## 2024-12-23 RX ORDER — MAGNESIUM SULFATE IN WATER 40 MG/ML
2000 INJECTION, SOLUTION INTRAVENOUS ONCE
Status: COMPLETED | OUTPATIENT
Start: 2024-12-23 | End: 2024-12-23

## 2024-12-23 RX ORDER — TRAZODONE HYDROCHLORIDE 100 MG/1
100 TABLET ORAL NIGHTLY PRN
Status: DISCONTINUED | OUTPATIENT
Start: 2024-12-23 | End: 2024-12-27 | Stop reason: HOSPADM

## 2024-12-23 RX ORDER — SODIUM CHLORIDE 0.9 % (FLUSH) 0.9 %
5-40 SYRINGE (ML) INJECTION EVERY 12 HOURS SCHEDULED
Status: DISCONTINUED | OUTPATIENT
Start: 2024-12-23 | End: 2024-12-27 | Stop reason: HOSPADM

## 2024-12-23 RX ORDER — POLYETHYLENE GLYCOL 3350 17 G/17G
17 POWDER, FOR SOLUTION ORAL DAILY PRN
Status: DISCONTINUED | OUTPATIENT
Start: 2024-12-23 | End: 2024-12-27 | Stop reason: HOSPADM

## 2024-12-23 RX ORDER — GUAIFENESIN/DEXTROMETHORPHAN 100-10MG/5
5 SYRUP ORAL EVERY 4 HOURS PRN
Status: DISCONTINUED | OUTPATIENT
Start: 2024-12-23 | End: 2024-12-27 | Stop reason: HOSPADM

## 2024-12-23 RX ORDER — HYDRALAZINE HYDROCHLORIDE 50 MG/1
100 TABLET, FILM COATED ORAL EVERY 8 HOURS
Status: DISCONTINUED | OUTPATIENT
Start: 2024-12-23 | End: 2024-12-27 | Stop reason: HOSPADM

## 2024-12-23 RX ORDER — ACETAMINOPHEN 325 MG/1
650 TABLET ORAL EVERY 6 HOURS PRN
Status: DISCONTINUED | OUTPATIENT
Start: 2024-12-23 | End: 2024-12-27 | Stop reason: HOSPADM

## 2024-12-23 RX ORDER — ALBUTEROL SULFATE 90 UG/1
2 INHALANT RESPIRATORY (INHALATION) EVERY 6 HOURS PRN
Status: DISCONTINUED | OUTPATIENT
Start: 2024-12-23 | End: 2024-12-27 | Stop reason: HOSPADM

## 2024-12-23 RX ORDER — SODIUM CHLORIDE 0.9 % (FLUSH) 0.9 %
5-40 SYRINGE (ML) INJECTION PRN
Status: DISCONTINUED | OUTPATIENT
Start: 2024-12-23 | End: 2024-12-27 | Stop reason: HOSPADM

## 2024-12-23 RX ORDER — BENZONATATE 100 MG/1
200 CAPSULE ORAL 3 TIMES DAILY PRN
Status: DISCONTINUED | OUTPATIENT
Start: 2024-12-23 | End: 2024-12-27 | Stop reason: HOSPADM

## 2024-12-23 RX ORDER — CINACALCET 30 MG/1
90 TABLET, FILM COATED ORAL
Status: DISCONTINUED | OUTPATIENT
Start: 2024-12-23 | End: 2024-12-25

## 2024-12-23 RX ORDER — ATORVASTATIN CALCIUM 40 MG/1
40 TABLET, FILM COATED ORAL NIGHTLY
Status: DISCONTINUED | OUTPATIENT
Start: 2024-12-23 | End: 2024-12-27 | Stop reason: HOSPADM

## 2024-12-23 RX ORDER — ACETAMINOPHEN 650 MG/1
650 SUPPOSITORY RECTAL EVERY 6 HOURS PRN
Status: DISCONTINUED | OUTPATIENT
Start: 2024-12-23 | End: 2024-12-27 | Stop reason: HOSPADM

## 2024-12-23 RX ORDER — ONDANSETRON 4 MG/1
4 TABLET, ORALLY DISINTEGRATING ORAL EVERY 8 HOURS PRN
Status: DISCONTINUED | OUTPATIENT
Start: 2024-12-23 | End: 2024-12-27 | Stop reason: HOSPADM

## 2024-12-23 RX ORDER — 0.9 % SODIUM CHLORIDE 0.9 %
250 INTRAVENOUS SOLUTION INTRAVENOUS ONCE
Status: COMPLETED | OUTPATIENT
Start: 2024-12-23 | End: 2024-12-23

## 2024-12-23 RX ORDER — HEPARIN SODIUM 1000 [USP'U]/ML
2000 INJECTION, SOLUTION INTRAVENOUS; SUBCUTANEOUS PRN
Status: DISCONTINUED | OUTPATIENT
Start: 2024-12-23 | End: 2024-12-26 | Stop reason: SDUPTHER

## 2024-12-23 RX ORDER — CALCITRIOL 0.25 UG/1
0.5 CAPSULE, LIQUID FILLED ORAL
Status: DISCONTINUED | OUTPATIENT
Start: 2024-12-23 | End: 2024-12-27 | Stop reason: HOSPADM

## 2024-12-23 RX ORDER — HEPARIN SODIUM 1000 [USP'U]/ML
4000 INJECTION, SOLUTION INTRAVENOUS; SUBCUTANEOUS ONCE
Status: COMPLETED | OUTPATIENT
Start: 2024-12-23 | End: 2024-12-23

## 2024-12-23 RX ORDER — SODIUM CHLORIDE 9 MG/ML
INJECTION, SOLUTION INTRAVENOUS PRN
Status: DISCONTINUED | OUTPATIENT
Start: 2024-12-23 | End: 2024-12-27 | Stop reason: HOSPADM

## 2024-12-23 RX ORDER — AMLODIPINE BESYLATE 10 MG/1
10 TABLET ORAL DAILY
Status: DISCONTINUED | OUTPATIENT
Start: 2024-12-24 | End: 2024-12-27 | Stop reason: HOSPADM

## 2024-12-23 RX ORDER — LANOLIN ALCOHOL/MO/W.PET/CERES
100 CREAM (GRAM) TOPICAL DAILY
Status: DISCONTINUED | OUTPATIENT
Start: 2024-12-24 | End: 2024-12-27 | Stop reason: HOSPADM

## 2024-12-23 RX ORDER — DOXAZOSIN 4 MG/1
4 TABLET ORAL EVERY 12 HOURS SCHEDULED
Status: DISCONTINUED | OUTPATIENT
Start: 2024-12-23 | End: 2024-12-27 | Stop reason: HOSPADM

## 2024-12-23 RX ORDER — ONDANSETRON 2 MG/ML
4 INJECTION INTRAMUSCULAR; INTRAVENOUS EVERY 6 HOURS PRN
Status: DISCONTINUED | OUTPATIENT
Start: 2024-12-23 | End: 2024-12-27 | Stop reason: HOSPADM

## 2024-12-23 RX ORDER — ASPIRIN 81 MG/1
81 TABLET ORAL DAILY
Status: DISCONTINUED | OUTPATIENT
Start: 2024-12-24 | End: 2024-12-27 | Stop reason: HOSPADM

## 2024-12-23 RX ADMIN — HEPARIN SODIUM 4000 UNITS: 1000 INJECTION INTRAVENOUS; SUBCUTANEOUS at 16:44

## 2024-12-23 RX ADMIN — AMIODARONE HYDROCHLORIDE 1 MG/MIN: 1.8 INJECTION, SOLUTION INTRAVENOUS at 21:39

## 2024-12-23 RX ADMIN — HEPARIN SODIUM 4000 UNITS: 1000 INJECTION INTRAVENOUS; SUBCUTANEOUS at 23:56

## 2024-12-23 RX ADMIN — CALCIUM GLUCONATE 2000 MG: 20 INJECTION, SOLUTION INTRAVENOUS at 21:43

## 2024-12-23 RX ADMIN — HEPARIN SODIUM 9 UNITS/KG/HR: 10000 INJECTION, SOLUTION INTRAVENOUS at 16:47

## 2024-12-23 RX ADMIN — HYDRALAZINE HYDROCHLORIDE 100 MG: 50 TABLET ORAL at 20:46

## 2024-12-23 RX ADMIN — METOPROLOL TARTRATE 2.5 MG: 5 INJECTION INTRAVENOUS at 15:49

## 2024-12-23 RX ADMIN — CALCITRIOL CAPSULES 0.25 MCG 0.5 MCG: 0.25 CAPSULE ORAL at 20:38

## 2024-12-23 RX ADMIN — METOPROLOL TARTRATE 2.5 MG: 5 INJECTION INTRAVENOUS at 16:11

## 2024-12-23 RX ADMIN — SODIUM CHLORIDE 250 ML: 9 INJECTION, SOLUTION INTRAVENOUS at 13:32

## 2024-12-23 RX ADMIN — GUAIFENESIN SYRUP AND DEXTROMETHORPHAN 5 ML: 100; 10 SYRUP ORAL at 20:46

## 2024-12-23 RX ADMIN — MAGNESIUM SULFATE HEPTAHYDRATE 2000 MG: 40 INJECTION, SOLUTION INTRAVENOUS at 15:30

## 2024-12-23 RX ADMIN — SODIUM CHLORIDE, PRESERVATIVE FREE 10 ML: 5 INJECTION INTRAVENOUS at 20:51

## 2024-12-23 RX ADMIN — AMIODARONE HYDROCHLORIDE 150 MG: 1.5 INJECTION, SOLUTION INTRAVENOUS at 21:25

## 2024-12-23 RX ADMIN — CLONIDINE HYDROCHLORIDE 0.3 MG: 0.2 TABLET ORAL at 20:38

## 2024-12-23 RX ADMIN — DOXAZOSIN 4 MG: 4 TABLET ORAL at 21:53

## 2024-12-23 RX ADMIN — CINACALCET HYDROCHLORIDE 90 MG: 30 TABLET, FILM COATED ORAL at 20:38

## 2024-12-23 RX ADMIN — ISOSORBIDE MONONITRATE 120 MG: 60 TABLET, EXTENDED RELEASE ORAL at 21:52

## 2024-12-23 RX ADMIN — ATORVASTATIN CALCIUM 40 MG: 40 TABLET, FILM COATED ORAL at 20:46

## 2024-12-23 ASSESSMENT — PAIN - FUNCTIONAL ASSESSMENT: PAIN_FUNCTIONAL_ASSESSMENT: NONE - DENIES PAIN

## 2024-12-23 NOTE — PROCEDURES
PROCEDURE NOTE  Date: 12/23/2024   Name: Trav Jennings  YOB: 1965    Procedures  EKG completed, given to Piper HAQUE

## 2024-12-23 NOTE — H&P
History & Physical  Internal Medicine Resident         Patient: Trav Jennings 59 y.o. male      : 1965  Date of Admission: 2024  Date of Service: Pt seen/examined on 24 and Admitted to Inpatient with expected LOS greater than two midnights due to medical therapy.       ASSESSMENT AND PLAN  Atrial Fibrillation with Rapid Ventricular Response, Recurrent  - Hx DCCV 2018  - Unclear etiology: suspect secondary to volume overload in the context of ESRD  - Patient denies any accompanying symptoms  - s/p 2x 2.5mg dose metoprolol in ER  - CHADVASC- 6; patient started on heparin infusion in ER  - TSH WNL; LFT WNL; patient started on amiodarone infusion on admission  - Continuous telemetry; Keep Mg>2, K>4  - 2024 ECHO EF 60-65% with severe concentric hypertrophy and abnormal diastolic function  - Cardiology consulted for advanced care recommendations     Chronic Hypoxic Respiratory Failure  - Patient endorses utilizing 2-4 L of oxygen with exertion  - Titrate to maintain SpO2 92 to 96%  - Multifactorial: Obstructive Lung Disease, Volume Overload, Physical Deconditioning  - Consideration for Home O2 Eval prior to discharge   - Consideration for Bedside spirometry prior to discharge    Troponin Elevation  - Suspect secondary to demand; confounded by nephrologic dysfunction  - Patient denies chest pain/tightness/pressure or anginal equivalent  - ECG showing atrial fibrillation with RVR  - Heparin infusion started  - Cardiology consulted for advanced care recommendations     ESRD  -//Sat Schedule  - Patient endorses anuria  - Nephrology consulted for advanced care recommendations    Hypomagnesemia  - Repleted     Resistant Hypertension  - With secondary hypertension secondary to REZA and nephrologic disease  - Patient's home isosorbide mononitrate, clonidine, doxazosin, hydralazine, amlodipine resumed with hold parameters     Chronic:  GERD  Unspecified Depressive Disorder/PTSD; on

## 2024-12-23 NOTE — ED NOTES
Pt to er. Pt was at dialysis and HR prior to was 105. After dialysis, pt HR 130s. Pt denies any CP, SOB, dizziness or other symptoms. PT states feels like his normal self. Pt was given 324mg ASA in route. Resp regular. Call light in reach,.

## 2024-12-23 NOTE — ED PROVIDER NOTES
[facility] EMERGENCY DEPT  EMERGENCY DEPARTMENT ENCOUNTER          Pt Name: Trav Jennings  MRN: [id number]  Birthdate 1965  Date of evaluation: 12/23/2024  Physician: Dariel Donaldson MD  Supervising Attending Physician: Diego Issa DO       CHIEF COMPLAINT       Chief Complaint   Patient presents with    Tachycardia                HISTORY OF PRESENT ILLNESS    HPI  Trav Jennings is a 59 y.o. male who presents to the emergency department  from dialysis , brought in by EMS for evaluation of tachycardia.  This patient was at dialysis, was told he was tachycardic into the 130s.  The patient was asymptomatic with no chest pain/pressure/tightness, no shortness of breath.  The patient denies any calf pain/tenderness/swelling, but does endorse that for the past week he has had a cough reduction of white/clear sputum, fever, chills.  He has not attempted any over-the-counter therapy for this.  Of note, the patient does have a past medical history of AAA, NURIS, anemia, depression, diabetes, CKD on dialysis, hyperlipidemia, hypertension, and hyperparathyroidism related to CKD.  The patient denies a history of blood clots, also denies any history of myocardial infarction, and denies any recent long distance travel in cars or plane.  The patient denies any sick contacts.  Related to the provide patient CKD, he states that he does not void  The patient has no other acute complaints at this time.      REVIEW OF SYSTEMS   Review of Systems      PAST MEDICAL AND SURGICAL HISTORY     Past Medical History:   Diagnosis Date    AAA (abdominal aortic aneurysm) ([facility])     NURIS (acute kidney injury) ([facility]) 9/24/2015    Anemia associated with chronic renal failure     Arthritis     stated in hands    Cocaine abuse ([facility]) 5/10/2014    Depression     Diabetes mellitus ([facility])     pt states he no longer has diabetes he has lost alot of davion.     FSGS (focal segmental glomerulosclerosis) 5/23/2013    Hemodialysis  Units (has no administration in time range)   heparin 25,000 units in dextrose 5% 250 mL (premix) infusion (has no administration in time range)   sodium chloride 0.9 % bolus 250 mL (0 mLs IntraVENous Stopped 12/23/24 1424)   magnesium sulfate 2000 mg in 50 mL IVPB premix (0 mg IntraVENous Stopped 12/23/24 1630)   metoprolol (LOPRESSOR) injection 2.5 mg (2.5 mg IntraVENous Given 12/23/24 1549)   metoprolol (LOPRESSOR) injection 2.5 mg (2.5 mg IntraVENous Given 12/23/24 1611)                PROCEDURES: (None if blank)  Procedures:     CRITICAL CARE:  None    MEDICATION CHANGES     New Prescriptions    No medications on file         FINAL DISPOSITION   Shared Decision-Making was performed, disposition discussed with the patient/family and questions answered.      Outpatient follow up (If applicable):  No follow-up provider specified.  Not applicable              FINAL DIAGNOSES:  Final diagnoses:   Atrial flutter, unspecified type (HCC)   Elevated troponin       Condition: condition: stable  Dispo: Admit to telemetry  DISPOSITION                 This transcription was electronically signed. It was dictated by use of voice recognition software and electronically transcribed. The transcription may contain errors not detected in proofreading.    Electronically signed by Dariel Donaldson MD on 12/23/24 at 1:29 PM EST

## 2024-12-23 NOTE — ED NOTES
Pt vitals collected. Pt provided with a lunch box a this time per pt request. Pt denies any further needs at this time. Pt updated on plan of care. Respirations easy and unlabored at this time.

## 2024-12-24 LAB
ANION GAP SERPL CALC-SCNC: 9 MEQ/L (ref 8–16)
BUN SERPL-MCNC: 36 MG/DL (ref 7–22)
CA-I BLD ISE-SCNC: 1.16 MMOL/L (ref 1.12–1.32)
CALCIUM SERPL-MCNC: 8.2 MG/DL (ref 8.5–10.5)
CHLORIDE SERPL-SCNC: 100 MEQ/L (ref 98–111)
CO2 SERPL-SCNC: 28 MEQ/L (ref 23–33)
CREAT SERPL-MCNC: 6.3 MG/DL (ref 0.4–1.2)
DEPRECATED RDW RBC AUTO: 53.4 FL (ref 35–45)
ERYTHROCYTE [DISTWIDTH] IN BLOOD BY AUTOMATED COUNT: 13.7 % (ref 11.5–14.5)
GFR SERPL CREATININE-BSD FRML MDRD: 10 ML/MIN/1.73M2
GLUCOSE SERPL-MCNC: 105 MG/DL (ref 70–108)
HCT VFR BLD AUTO: 32.9 % (ref 42–52)
HEPARIN UNFRACTIONATED: 0.2 U/ML (ref 0.3–0.7)
HEPARIN UNFRACTIONATED: 0.2 U/ML (ref 0.3–0.7)
HEPARIN UNFRACTIONATED: 0.21 U/ML (ref 0.3–0.7)
HGB BLD-MCNC: 10 GM/DL (ref 14–18)
MAGNESIUM SERPL-MCNC: 2.6 MG/DL (ref 1.6–2.4)
MCH RBC QN AUTO: 32.4 PG (ref 26–33)
MCHC RBC AUTO-ENTMCNC: 30.4 GM/DL (ref 32.2–35.5)
MCV RBC AUTO: 106.5 FL (ref 80–94)
PHOSPHATE SERPL-MCNC: 3.1 MG/DL (ref 2.4–4.7)
PLATELET # BLD AUTO: 110 THOU/MM3 (ref 130–400)
PMV BLD AUTO: 10.9 FL (ref 9.4–12.4)
POTASSIUM SERPL-SCNC: 5.2 MEQ/L (ref 3.5–5.2)
RBC # BLD AUTO: 3.09 MILL/MM3 (ref 4.7–6.1)
SODIUM SERPL-SCNC: 137 MEQ/L (ref 135–145)
WBC # BLD AUTO: 5.8 THOU/MM3 (ref 4.8–10.8)

## 2024-12-24 PROCEDURE — 99222 1ST HOSP IP/OBS MODERATE 55: CPT | Performed by: INTERNAL MEDICINE

## 2024-12-24 PROCEDURE — 82330 ASSAY OF CALCIUM: CPT

## 2024-12-24 PROCEDURE — 2140000000 HC CCU INTERMEDIATE R&B

## 2024-12-24 PROCEDURE — 36415 COLL VENOUS BLD VENIPUNCTURE: CPT

## 2024-12-24 PROCEDURE — 2500000003 HC RX 250 WO HCPCS

## 2024-12-24 PROCEDURE — 80048 BASIC METABOLIC PNL TOTAL CA: CPT

## 2024-12-24 PROCEDURE — 85520 HEPARIN ASSAY: CPT

## 2024-12-24 PROCEDURE — 6370000000 HC RX 637 (ALT 250 FOR IP): Performed by: PHYSICIAN ASSISTANT

## 2024-12-24 PROCEDURE — 84100 ASSAY OF PHOSPHORUS: CPT

## 2024-12-24 PROCEDURE — 6360000002 HC RX W HCPCS

## 2024-12-24 PROCEDURE — 83735 ASSAY OF MAGNESIUM: CPT

## 2024-12-24 PROCEDURE — 99223 1ST HOSP IP/OBS HIGH 75: CPT | Performed by: INTERNAL MEDICINE

## 2024-12-24 PROCEDURE — 99232 SBSQ HOSP IP/OBS MODERATE 35: CPT | Performed by: INTERNAL MEDICINE

## 2024-12-24 PROCEDURE — 90935 HEMODIALYSIS ONE EVALUATION: CPT | Performed by: INTERNAL MEDICINE

## 2024-12-24 PROCEDURE — 90935 HEMODIALYSIS ONE EVALUATION: CPT

## 2024-12-24 PROCEDURE — 93005 ELECTROCARDIOGRAM TRACING: CPT

## 2024-12-24 PROCEDURE — 85027 COMPLETE CBC AUTOMATED: CPT

## 2024-12-24 PROCEDURE — 5A1D70Z PERFORMANCE OF URINARY FILTRATION, INTERMITTENT, LESS THAN 6 HOURS PER DAY: ICD-10-PCS | Performed by: INTERNAL MEDICINE

## 2024-12-24 PROCEDURE — 6370000000 HC RX 637 (ALT 250 FOR IP)

## 2024-12-24 RX ORDER — 0.9 % SODIUM CHLORIDE 0.9 %
250 INTRAVENOUS SOLUTION INTRAVENOUS ONCE
Status: DISCONTINUED | OUTPATIENT
Start: 2024-12-24 | End: 2024-12-27 | Stop reason: HOSPADM

## 2024-12-24 RX ORDER — METOPROLOL TARTRATE 1 MG/ML
5 INJECTION, SOLUTION INTRAVENOUS ONCE
Status: COMPLETED | OUTPATIENT
Start: 2024-12-24 | End: 2024-12-24

## 2024-12-24 RX ADMIN — ASPIRIN 81 MG: 81 TABLET, COATED ORAL at 13:37

## 2024-12-24 RX ADMIN — GUAIFENESIN SYRUP AND DEXTROMETHORPHAN 5 ML: 100; 10 SYRUP ORAL at 20:26

## 2024-12-24 RX ADMIN — GUAIFENESIN SYRUP AND DEXTROMETHORPHAN 5 ML: 100; 10 SYRUP ORAL at 01:47

## 2024-12-24 RX ADMIN — GUAIFENESIN SYRUP AND DEXTROMETHORPHAN 5 ML: 100; 10 SYRUP ORAL at 14:12

## 2024-12-24 RX ADMIN — GUAIFENESIN SYRUP AND DEXTROMETHORPHAN 5 ML: 100; 10 SYRUP ORAL at 06:23

## 2024-12-24 RX ADMIN — AMLODIPINE BESYLATE 10 MG: 10 TABLET ORAL at 13:37

## 2024-12-24 RX ADMIN — AMIODARONE HYDROCHLORIDE 0.5 MG/MIN: 1.8 INJECTION, SOLUTION INTRAVENOUS at 04:17

## 2024-12-24 RX ADMIN — DOXAZOSIN 4 MG: 4 TABLET ORAL at 13:37

## 2024-12-24 RX ADMIN — SERTRALINE HYDROCHLORIDE 150 MG: 50 TABLET ORAL at 13:37

## 2024-12-24 RX ADMIN — HEPARIN SODIUM 2000 UNITS: 1000 INJECTION INTRAVENOUS; SUBCUTANEOUS at 22:31

## 2024-12-24 RX ADMIN — ISOSORBIDE MONONITRATE 120 MG: 60 TABLET, EXTENDED RELEASE ORAL at 13:37

## 2024-12-24 RX ADMIN — ATORVASTATIN CALCIUM 40 MG: 40 TABLET, FILM COATED ORAL at 20:26

## 2024-12-24 RX ADMIN — AMIODARONE HYDROCHLORIDE 0.5 MG/MIN: 1.8 INJECTION, SOLUTION INTRAVENOUS at 16:14

## 2024-12-24 RX ADMIN — HEPARIN SODIUM 2000 UNITS: 1000 INJECTION INTRAVENOUS; SUBCUTANEOUS at 14:07

## 2024-12-24 RX ADMIN — METOPROLOL TARTRATE 5 MG: 5 INJECTION INTRAVENOUS at 06:22

## 2024-12-24 RX ADMIN — HEPARIN SODIUM 2000 UNITS: 1000 INJECTION INTRAVENOUS; SUBCUTANEOUS at 05:30

## 2024-12-24 RX ADMIN — Medication 100 MG: at 13:36

## 2024-12-24 RX ADMIN — HEPARIN SODIUM 17 UNITS/KG/HR: 10000 INJECTION, SOLUTION INTRAVENOUS at 14:09

## 2024-12-24 NOTE — PROGRESS NOTES
Dr. Ramos made aware of patient's heart rate now sinus tachycardia in 120's and of patient's blood pressure of 78/56 with afternoon vitals. Dr. Pruett also made aware per request of Dr. Ramos.

## 2024-12-24 NOTE — PROCEDURES
PROCEDURE NOTE  Date: 12/24/2024   Name: Trav Jennings  YOB: 1965    Procedures  EKG completed

## 2024-12-24 NOTE — CONSULTS
Seeing for ESRD  Seen during dialysis tolerating well  Blood pressure 121/64 UF 3900 mL blood flow rate of 500 and venous pressure of 180  See full consult for additional details    Ottoniel Pruett MD

## 2024-12-24 NOTE — CARE COORDINATION
12/24/24 1154   Readmission Assessment   Number of Days since last admission? 8-30 days   Previous Disposition Home with Family   Who is being Interviewed Patient   What was the patient's/caregiver's perception as to why they think they needed to return back to the hospital? Other (Comment)  (chest pain)   Did you visit your Primary Care Physician after you left the hospital, before you returned this time? No   Why weren't you able to visit your PCP? Other (Comment)  (readmitted prior to apt)   Did you see a specialist, such as Cardiac, Pulmonary, Orthopedic Physician, etc. after you left the hospital? Yes   Who advised the patient to return to the hospital? Self-referral   Does the patient report anything that got in the way of taking their medications? No   In our efforts to provide the best possible care to you and others like you, can you think of anything that we could have done to help you after you left the hospital the first time, so that you might not have needed to return so soon? Other (Comment)  (denied)

## 2024-12-24 NOTE — CONSULTS
[facility]'s  Consult    Patient's Name/Date of Birth: Trav Jennings / 1965 (59 y.o.)    Date: December 24, 2024     Referring Provider: Lizy Ramos MD    CHIEF COMPLAINT: tachycardia      HPI: This is a pleasant 59 y.o. male presents with to the ED with hypoxia and tachycardia.  Noted worsening tachycardia after dialysis session.  Patient is ESRD patient on dialysis Tuesday/Thursday/Saturday.  Patient noted to be in atrial fibrillation/flutter with RVR in the emergency department.  Patient does have a history of this and required DCCV back in 2018.  Does not appear to be on any rate/rhythm controlling medications and not on any anticoagulations either.  Unclear etiology, may be hypervolemia in setting of ESRD.  May have been precipitated by upper respiratory infection as patient was complaining of headaches, cough, and yellow sputum production.  Also noticed an increase bilateral lower extremity edema. Cardiology was consulted give the afib and multiple medical comorbidities.       Echo:   TTE complete 12/6/2024  EF 60 to 65%, left ventricular size normal, moderately increased wall thickness, severe concentric hypertrophy, normal wall motion, abnormal diastolic function  Mild mitral valve regurgitation  Mild tricuspid valve regurgitation  Left atrium mildly dilated    Stress Test:  Lexiscan 12/6/2024  Moderate severity left ventricular stress perfusion defect medium in size, inferior and inferolateral segments, partially reversible  Appears to be probable artifact caused by soft tissue    All labs, EKG's, diagnostic testing and images as well as cardiac cath, stress testing were reviewed during this encounter    Past Medical History:   Diagnosis Date    AAA (abdominal aortic aneurysm) ([facility])     NURIS (acute kidney injury) ([facility]) 9/24/2015    Anemia associated with chronic renal failure     Arthritis     stated in hands    Cocaine abuse ([facility]) 5/10/2014    Depression     Diabetes  team  Continue telemetry  Daily BMP/mag, keep K>4, mag >2  Cardiology to follow    Supervising Physician's Attestation Statement  I performed a history and physical examination on the patient and discussed the management with the resident physician. I reviewed and agree with the findings and plan as documented in the resident's note except for as noted below.    Time spent reviewing notes, data, discussing with patient/family, and formulating plan with clinical documentation was approximately 80 minutes.    Electronically signed by Caden Gomes MD on 12/24/24 at 7:22 PM EST

## 2024-12-24 NOTE — CARE COORDINATION
Case Management Assessment Initial Evaluation    Date/Time of Evaluation: 2024 8:12 AM  Assessment Completed by: Ele Blackmon RN    If patient is discharged prior to next notation, then this note serves as note for discharge by case management.    Patient Name: Trav Jain                   YOB: 1965  Diagnosis: Elevated troponin [R79.89]  Atrial fibrillation with RVR (HCC) [I48.91]  Atrial flutter, unspecified type (HCC) [I48.92]                   Date / Time: 2024 12:02 PM  Location: [address] [place] [address]-     Patient Admission Status: Inpatient   Readmission Risk Low 0-14, Mod 15-19), High > 20: Readmission Risk Score: 31.2    Current PCP: Radha Hou APRN - CNP  Health Care Decision Makers:   Primary Decision Maker: UZIEL JAIN - Spouse - [phone]    Additional Case Management Notes: Admitted with afib rvr. Hospitalist following. Nephro consult as Trav is on HD OP. Cardio consult. Heparin gtt. Amiodarone gtt. Clonidine. ASA. Lipitor. Norvasc. Imdur. Cardura.     Procedures: none    Imagin/23 CXR: There is cardiomegaly.. There is an endoluminal graft in the thoracic aorta.   There  is increased density at the right lung base consistent with infiltrate  and effusion.  The pulmonary vascularity is increased. No suspicious osseous  lesions are present.      Patient Goals/Plan/Treatment Preferences: Spoke with Trav in HD, he plans home with his wife. Current with [facility], chair time TTS 0650. Uses RTA or family transports. Has O2 concentrator at home, uses 2-4L (he owns through  [employer] from [employer]). He declined services.          24 1151   Service Assessment   Patient Orientation Alert and Oriented   Cognition Alert   History Provided By Patient   Primary Caregiver Self   Support Systems Spouse/Significant Other;Family Members   Patient's Healthcare Decision Maker is: Legal Next of Kin   PCP Verified by CM Yes   Last Visit to PCP Within last 3 months   Prior  Functional Level Independent in ADLs/IADLs   Current Functional Level Independent in ADLs/IADLs   Can patient return to prior living arrangement Yes   Ability to make needs known: Good   Family able to assist with home care needs: Yes   Would you like for me to discuss the discharge plan with any other family members/significant others, and if so, who? No   Financial Resources Medicare;Medicaid   Community Resources Other (Comment)  (HD)   Social/Functional History   Active  Yes  (RTA and family also assist)   Discharge Planning   Type of Residence House   Living Arrangements Spouse/Significant Other   Current Services Prior To Admission None   Current DME Prior to Arrival Oxygen Therapy (Comment)   Potential Assistance Needed N/A   DME Ordered? No   Potential Assistance Purchasing Medications No   Type of Home Care Services None   Services At/After Discharge   Transition of Care Consult (CM Consult) Discharge Planning   Services At/After Discharge None   Mode of Transport at Discharge Other (see comment)  (family)   Confirm Follow Up Transport Other (see comment)  (family and RTA)   Condition of Participation: Discharge Planning   The Plan for Transition of Care is related to the following treatment goals: feel better soon so I can go back home

## 2024-12-24 NOTE — PROGRESS NOTES
Pt's is in Afib RVR with -124/min. Pt has been in Amio gtt since 2039. Dr. Bradford perfectserved and updated. See new order for metoprolol

## 2024-12-24 NOTE — CONSULTS
[facility]    [address], [place] [address]  [phone]     Hospital Consult  12/24/2024 6:10 AM    Pt Name:    Trav Jennings  MRN:     [id number]   [id number]  YOB: 1965  Admit Date:    12/23/2024 12:02 PM  Primary Care Physician:  Radha Hou APRN - CNP        Reason for Consult:  ESRD    History:   The patient is a 59 y.o. male presented after HD yesterday with tachycardia.   His HR was running 120s during HD yesterday  and up into the 130s.  He was asymptomatic.  He had his full dialysis treatment yesterday.  In ED he was noted to be in Afib with RVR.  He was started on heparin drip and Amio with cardiology consult.   HR continued in 120s overnight.  Soft bps noted at times. He denied SOB. CXR showed some pulmonary congestion and was above dry weight at end of treatment yesterday.    Past Medical History:  Past Medical History:   Diagnosis Date    AAA (abdominal aortic aneurysm) ([facility])     NURIS (acute kidney injury) ([facility]) 9/24/2015    Anemia associated with chronic renal failure     Arthritis     stated in hands    Cocaine abuse ([facility]) 5/10/2014    Depression     Diabetes mellitus ([facility])     pt states he no longer has diabetes he has lost alot of davion.     FSGS (focal segmental glomerulosclerosis) 5/23/2013    Hemodialysis patient ([facility]) 10/17/2016    on hemodialysis with [facility]    Hemorrhoids 1/16/2012    History of blood transfusion     Hyperlipidemia     Hyperphosphatemia 5/21/2016    Hypertension     Left renal artery stenosis ([facility]) 5/22/2014    Monoclonal (M) protein disease, multiple 'M' protein     Nicotine dependence 6/16/2014    Noncompliance     Pneumonia     Psychiatric problem     PTSD (post-traumatic stress disorder)     Pt vet from [facility]    Secondary hyperparathyroidism (of renal origin)     Sleep apnea        Past Surgical History:  Past Surgical History:   Procedure Laterality Date    ABDOMINAL AORTIC ANEURYSM  @PTH@  TSH:   Recent Labs     12/23/24  1353   TSH 0.542     HgBa1c: No results for input(s): \"LABA1C\" in the last 72 hours.  Hepatic:   Recent Labs     12/23/24  1843   AST 16   ALT 12   BILITOT 0.2*   ALKPHOS 97     ABGs: No results found for: \"PHART\", \"PO2ART\", \"RKI1ZUI\"  Troponin: No results for input(s): \"TROPONINI\" in the last 72 hours.  BNP: No results for input(s): \"BNP\" in the last 72 hours.    Old labs reviewed.       Impression and Plan:  ESRD on HD TTS: he had dialysis yesterday however left above dry weight and CXR shows increased pulmonary vascularity.  K is 5.2. will get him dialyzed again today  Afib with RVR  Pulmonary edema  HTN  HFpEF  Anemia in CKD  Elevated trop  Hx drug use    Thank you for allowing me to participate in the care of this patient. Please feel free to call me if you have any questions.     Electronically signed by Hermila Clayton DO on 12/24/24 at 6:10 AM EST    [facility]

## 2024-12-24 NOTE — PROGRESS NOTES
Hospitalist Progress Note    Patient:  Trav Jennings      Unit/Bed:3B-37/037-A    YOB: 1965    MRN: [id number]       Acct: [id number]     PCP: Radha Hou APRN - CNP    Date of Admission: 12/23/2024    Active Hospital Problems    Diagnosis Date Noted    Atrial fibrillation with RVR (HCC) [I48.91] 12/23/2024     Assessment/Plan:    # Atrial Fibrillation with Rapid Ventricular Response, Recurrent  - Hx DCCV 03/21/2018  - CHADVASC- 6; patient started on heparin infusion in ER  - TSH WNL; LFT WNL; patient started on amiodarone infusion on admission  - Continuous telemetry; Keep Mg>2, K>4  - 12/06/2024 ECHO EF 60-65% with severe concentric hypertrophy and abnormal diastolic function  - Cardiology consulted for advanced care recommendations      # Chronic Hypoxic Respiratory Failure  - Patient endorses utilizing 2-4 L of oxygen with exertion  - Titrate to maintain SpO2 92 to 96%  - Multifactorial: Obstructive Lung Disease, Volume Overload, Physical Deconditioning  - Consideration for Home O2 Eval prior to discharge   - Consideration for Bedside spirometry prior to discharge     # Troponin Elevation  - Suspect secondary to demand; confounded by nephrologic dysfunction  - Patient denies chest pain/tightness/pressure or anginal equivalent  - ECG showing atrial fibrillation with RVR  - Heparin infusion started  - Cardiology consulted for advanced care recommendations      # ESRD  -T/Th/Sat Schedule  - Patient endorses anuria  - Nephrology consulted for advanced care recommendations     # Hypomagnesemia  - Repleted      # Resistant Hypertension  - With secondary hypertension secondary to REZA and nephrologic disease  - Patient's home isosorbide mononitrate, clonidine, doxazosin, hydralazine, amlodipine resumed with hold parameters      Chronic:  GERD  Unspecified Depressive Disorder/PTSD; on sertraline/clonidine/doxazosin  Chronic Macrocytic Anemia  Secondary Hyperparathyroidism, on  on cinacalcet/calcitriol  HLD, on statin  Tobacco Use Disorder  Hx TIA  Hx Cocaine Use Disorder  Hx Type B Dissection Thoracic Aorta  Hx AAA s/p aortic stent graft 07/2018  Hx Right Trapped Lung, s/p thoracotomy, s/p chemical pneumolysis        Chief Complaint: Asymptomatic Tachycardia     Hospital Course:       Subjective: no acute events overnight. Had dialysis this morning, tolerated. Continues to be in atrial fibrillation, on amio drip. No chest pain or sob. No fevers or chills. Tolerating PO intake.       Medications:  Reviewed    Infusion Medications    heparin (PORCINE) Infusion 15 Units/kg/hr (12/24/24 0540)    sodium chloride      amiodarone 0.5 mg/min (12/24/24 0540)     Scheduled Medications    sodium chloride flush  5-40 mL IntraVENous 2 times per day    amLODIPine  10 mg Oral Daily    aspirin  81 mg Oral Daily    atorvastatin  40 mg Oral Nightly    calcitRIOL  0.5 mcg Oral Once per day on Monday Wednesday Friday    cinacalcet  90 mg Oral Once per day on Monday Wednesday Friday    cloNIDine  0.3 mg Oral TID    doxazosin  4 mg Oral 2 times per day    hydrALAZINE  100 mg Oral q8h    isosorbide mononitrate  120 mg Oral BID    sertraline  150 mg Oral Daily    thiamine  100 mg Oral Daily     PRN Meds: heparin (porcine), heparin (porcine), sodium chloride flush, sodium chloride, ondansetron **OR** ondansetron, polyethylene glycol, acetaminophen **OR** acetaminophen, albuterol sulfate HFA, traZODone, benzonatate, guaiFENesin-dextromethorphan      Intake/Output Summary (Last 24 hours) at 12/24/2024 1206  Last data filed at 12/24/2024 0745  Gross per 24 hour   Intake 1180.65 ml   Output --   Net 1180.65 ml       Diet:  ADULT DIET; Regular; Low Fat/Low Chol/High Fiber/WEI; Low Sodium (2 gm); Low Potassium (Less than 3000 mg/day); Low Phosphorus (Less than 1000 mg); 2000 ml; No red dye    Exam:  /85   Pulse (!) 118   Temp 98.1 °F (36.7 °C)   Resp 26   Ht 1.905 m (6' 3\")   Wt 98.4 kg (216 lb 14.9 oz)

## 2024-12-24 NOTE — PLAN OF CARE
Problem: Chronic Conditions and Co-morbidities  Goal: Patient's chronic conditions and co-morbidity symptoms are monitored and maintained or improved  Outcome: Progressing     Problem: Discharge Planning  Goal: Discharge to home or other facility with appropriate resources  Outcome: Progressing  Flowsheets (Taken 12/23/2024 2000)  Discharge to home or other facility with appropriate resources: Identify barriers to discharge with patient and caregiver     Problem: Self Harm/Suicidality  Goal: Will have no self-injury during hospital stay  Description: INTERVENTIONS:  1.  Ensure constant observer at bedside with Q15M safety checks  2.  Maintain a safe environment  3.  Secure patient belongings  4.  Ensure family/visitors adhere to safety recommendations  5.  Ensure safety tray has been added to patient's diet order  6.  Every shift and PRN: Re-assess suicidal risk via Frequent Screener    Outcome: Progressing     Problem: Safety - Adult  Goal: Free from fall injury  Outcome: Progressing  Note: Falling star program remains in place. Call light and personal belongings within reach. Frequent visual monitoring continues.  Toileting program inplace. Patient assisted in turning/repositioning at least once every 2 hours, and on a prn basis.       Problem: Risk for Elopement  Goal: Patient will not exit the unit/facility without proper excort  Outcome: Progressing  Flowsheets (Taken 12/23/2024 2300)  Nursing Interventions for Elopement Risk:   Collaborate with family members/caregivers to mitigate the elopement risk   Collaborate with treatment team for drug withdrawal symptoms treatment   Assist with personal care needs such as toileting, eating, dressing, as needed to reduce the risk of wandering

## 2024-12-24 NOTE — FLOWSHEET NOTE
12/24/24 1240   Vital Signs   /69   Pulse (!) 103   Respirations 20   Weight - Scale 94.9 kg (209 lb 3.5 oz)   Weight Method Bed scale   Percent Weight Change -3.56   Post-Hemodialysis Assessment   Post-Treatment Procedures Blood returned;Access bleeding time < 10 minutes   Machine Disinfection Process Acid/Vinegar Clean;Heat Disinfect;Exterior Machine Disinfection   Rinseback Volume (ml) 400 ml   Blood Volume Processed (Liters) 114.2 L   Dialyzer Clearance Lightly streaked   Duration of Treatment (minutes) 240 minutes   Heparin Amount Administered During Treatment (mL) 0 mL   Hemodialysis Intake (ml) 400 ml   Hemodialysis Output (ml) 3900 ml   NET Removed (ml) 3500     stable 4 hour treatment. 3.5 liters net uf. pressure held both cannulation sites x 10 minutes. dressing dry and intact upon discharge from unit.

## 2024-12-25 LAB
ANION GAP SERPL CALC-SCNC: 13 MEQ/L (ref 8–16)
BUN SERPL-MCNC: 29 MG/DL (ref 7–22)
CALCIUM SERPL-MCNC: 8.5 MG/DL (ref 8.5–10.5)
CHLORIDE SERPL-SCNC: 98 MEQ/L (ref 98–111)
CO2 SERPL-SCNC: 24 MEQ/L (ref 23–33)
CREAT SERPL-MCNC: 5.7 MG/DL (ref 0.4–1.2)
DEPRECATED RDW RBC AUTO: 52.3 FL (ref 35–45)
ERYTHROCYTE [DISTWIDTH] IN BLOOD BY AUTOMATED COUNT: 13.8 % (ref 11.5–14.5)
GFR SERPL CREATININE-BSD FRML MDRD: 11 ML/MIN/1.73M2
GLUCOSE SERPL-MCNC: 100 MG/DL (ref 70–108)
HCT VFR BLD AUTO: 35.9 % (ref 42–52)
HEPARIN UNFRACTIONATED: 0.37 U/ML (ref 0.3–0.7)
HGB BLD-MCNC: 10.9 GM/DL (ref 14–18)
MAGNESIUM SERPL-MCNC: 2.3 MG/DL (ref 1.6–2.4)
MCH RBC QN AUTO: 31.6 PG (ref 26–33)
MCHC RBC AUTO-ENTMCNC: 30.4 GM/DL (ref 32.2–35.5)
MCV RBC AUTO: 104.1 FL (ref 80–94)
PLATELET # BLD AUTO: 120 THOU/MM3 (ref 130–400)
PMV BLD AUTO: 10.1 FL (ref 9.4–12.4)
POTASSIUM SERPL-SCNC: 5 MEQ/L (ref 3.5–5.2)
RBC # BLD AUTO: 3.45 MILL/MM3 (ref 4.7–6.1)
SODIUM SERPL-SCNC: 135 MEQ/L (ref 135–145)
WBC # BLD AUTO: 5.3 THOU/MM3 (ref 4.8–10.8)

## 2024-12-25 PROCEDURE — 6370000000 HC RX 637 (ALT 250 FOR IP): Performed by: INTERNAL MEDICINE

## 2024-12-25 PROCEDURE — 6370000000 HC RX 637 (ALT 250 FOR IP)

## 2024-12-25 PROCEDURE — 83735 ASSAY OF MAGNESIUM: CPT

## 2024-12-25 PROCEDURE — 99232 SBSQ HOSP IP/OBS MODERATE 35: CPT | Performed by: PHYSICIAN ASSISTANT

## 2024-12-25 PROCEDURE — 2500000003 HC RX 250 WO HCPCS

## 2024-12-25 PROCEDURE — 36415 COLL VENOUS BLD VENIPUNCTURE: CPT

## 2024-12-25 PROCEDURE — 99232 SBSQ HOSP IP/OBS MODERATE 35: CPT | Performed by: INTERNAL MEDICINE

## 2024-12-25 PROCEDURE — 2140000000 HC CCU INTERMEDIATE R&B

## 2024-12-25 PROCEDURE — 6370000000 HC RX 637 (ALT 250 FOR IP): Performed by: PHYSICIAN ASSISTANT

## 2024-12-25 PROCEDURE — 6360000002 HC RX W HCPCS

## 2024-12-25 PROCEDURE — 80048 BASIC METABOLIC PNL TOTAL CA: CPT

## 2024-12-25 PROCEDURE — 85520 HEPARIN ASSAY: CPT

## 2024-12-25 PROCEDURE — 85027 COMPLETE CBC AUTOMATED: CPT

## 2024-12-25 PROCEDURE — 2500000003 HC RX 250 WO HCPCS: Performed by: PHYSICIAN ASSISTANT

## 2024-12-25 RX ORDER — METOPROLOL TARTRATE 1 MG/ML
2.5 INJECTION, SOLUTION INTRAVENOUS ONCE
Status: COMPLETED | OUTPATIENT
Start: 2024-12-25 | End: 2024-12-25

## 2024-12-25 RX ORDER — CINACALCET 30 MG/1
90 TABLET, FILM COATED ORAL
Status: DISCONTINUED | OUTPATIENT
Start: 2024-12-26 | End: 2024-12-27 | Stop reason: HOSPADM

## 2024-12-25 RX ADMIN — DOXAZOSIN 4 MG: 4 TABLET ORAL at 23:42

## 2024-12-25 RX ADMIN — AMIODARONE HYDROCHLORIDE 0.5 MG/MIN: 1.8 INJECTION, SOLUTION INTRAVENOUS at 16:43

## 2024-12-25 RX ADMIN — SODIUM CHLORIDE, PRESERVATIVE FREE 10 ML: 5 INJECTION INTRAVENOUS at 07:59

## 2024-12-25 RX ADMIN — ISOSORBIDE MONONITRATE 120 MG: 60 TABLET, EXTENDED RELEASE ORAL at 20:59

## 2024-12-25 RX ADMIN — AMIODARONE HYDROCHLORIDE 0.5 MG/MIN: 1.8 INJECTION, SOLUTION INTRAVENOUS at 04:04

## 2024-12-25 RX ADMIN — AMLODIPINE BESYLATE 10 MG: 10 TABLET ORAL at 08:01

## 2024-12-25 RX ADMIN — CLONIDINE HYDROCHLORIDE 0.3 MG: 0.2 TABLET ORAL at 23:40

## 2024-12-25 RX ADMIN — DOXAZOSIN 4 MG: 4 TABLET ORAL at 11:37

## 2024-12-25 RX ADMIN — HEPARIN SODIUM 19 UNITS/KG/HR: 10000 INJECTION, SOLUTION INTRAVENOUS at 18:48

## 2024-12-25 RX ADMIN — HYDRALAZINE HYDROCHLORIDE 100 MG: 50 TABLET ORAL at 04:01

## 2024-12-25 RX ADMIN — METOPROLOL TARTRATE 2.5 MG: 5 INJECTION INTRAVENOUS at 00:42

## 2024-12-25 RX ADMIN — CALCITRIOL CAPSULES 0.25 MCG 0.5 MCG: 0.25 CAPSULE ORAL at 08:00

## 2024-12-25 RX ADMIN — ASPIRIN 81 MG: 81 TABLET, COATED ORAL at 11:32

## 2024-12-25 RX ADMIN — ATORVASTATIN CALCIUM 40 MG: 40 TABLET, FILM COATED ORAL at 21:00

## 2024-12-25 RX ADMIN — Medication 100 MG: at 07:59

## 2024-12-25 RX ADMIN — GUAIFENESIN SYRUP AND DEXTROMETHORPHAN 5 ML: 100; 10 SYRUP ORAL at 18:45

## 2024-12-25 RX ADMIN — GUAIFENESIN SYRUP AND DEXTROMETHORPHAN 5 ML: 100; 10 SYRUP ORAL at 23:46

## 2024-12-25 RX ADMIN — SERTRALINE HYDROCHLORIDE 150 MG: 50 TABLET ORAL at 07:59

## 2024-12-25 RX ADMIN — ISOSORBIDE MONONITRATE 120 MG: 60 TABLET, EXTENDED RELEASE ORAL at 12:29

## 2024-12-25 RX ADMIN — HEPARIN SODIUM 19 UNITS/KG/HR: 10000 INJECTION, SOLUTION INTRAVENOUS at 05:31

## 2024-12-25 RX ADMIN — GUAIFENESIN SYRUP AND DEXTROMETHORPHAN 5 ML: 100; 10 SYRUP ORAL at 04:00

## 2024-12-25 RX ADMIN — BENZONATATE 200 MG: 100 CAPSULE ORAL at 18:43

## 2024-12-25 ASSESSMENT — PAIN SCALES - GENERAL
PAINLEVEL_OUTOF10: 0

## 2024-12-25 NOTE — PROGRESS NOTES
[facility]   Nephrology progress note  12/25/2024, 10:55 AM      Pt Name:    Trav Jennings  MRN:     [id number]     YOB: 1965  Admit Date:    12/23/2024 12:02 PM    Chief Complaint: Nephrology following for ESRD on hemodialysis.    Subjective:  Patient seen and examined  No chest pain or shortness of breath  Feels okay  Blood pressure is better    Objective:  24HR INTAKE/OUTPUT:    Intake/Output Summary (Last 24 hours) at 12/25/2024 1055  Last data filed at 12/25/2024 0917  Gross per 24 hour   Intake 900 ml   Output 3900 ml   Net -3000 ml      Admission weight: 103 kg (227 lb 1.2 oz)  Wt Readings from Last 3 Encounters:   12/24/24 94.9 kg (209 lb 3.5 oz)   12/07/24 96 kg (211 lb 10.3 oz)   09/30/24 94.3 kg (207 lb 14.3 oz)        Vitals :   Vitals:    12/25/24 0115 12/25/24 0300 12/25/24 0350 12/25/24 0745   BP:   118/62 105/69   Pulse: 98 (!) 115 (!) 117 (!) 119   Resp:   18 20   Temp:   97.5 °F (36.4 °C) 98.1 °F (36.7 °C)   TempSrc:   Oral Oral   SpO2:   98% 99%   Weight:       Height:           Physical examination  General Appearance:  Well developed. No distress  Mouth/Throat:  Oral mucosa moist  Neck:  Supple, no JVD  Lungs:  Breath sounds: clear  Heart::  S1,S2 heard  Abdomen:  Soft, non - tender  Musculoskeletal:  Edema -none    Medications:  Infusion:    heparin (PORCINE) Infusion 19 Units/kg/hr (12/25/24 0802)    sodium chloride      amiodarone 0.5 mg/min (12/25/24 0404)     Meds:    [START ON 12/26/2024] cinacalcet  90 mg Oral Once per day on Tuesday Thursday Saturday    sodium chloride  250 mL IntraVENous Once    sodium chloride flush  5-40 mL IntraVENous 2 times per day    amLODIPine  10 mg Oral Daily    aspirin  81 mg Oral Daily    atorvastatin  40 mg Oral Nightly    calcitRIOL  0.5 mcg Oral Once per day on Monday Wednesday Friday    cloNIDine  0.3 mg Oral TID    doxazosin  4 mg Oral 2 times per day    hydrALAZINE  100 mg Oral q8h    isosorbide mononitrate  120 mg

## 2024-12-25 NOTE — PLAN OF CARE
Problem: Chronic Conditions and Co-morbidities  Goal: Patient's chronic conditions and co-morbidity symptoms are monitored and maintained or improved  Outcome: Progressing  Flowsheets (Taken 12/25/2024 1117)  Care Plan - Patient's Chronic Conditions and Co-Morbidity Symptoms are Monitored and Maintained or Improved: Monitor and assess patient's chronic conditions and comorbid symptoms for stability, deterioration, or improvement  Note: He does have sleep apnea and has a home CPAP but it is not here.     Problem: Discharge Planning  Goal: Discharge to home or other facility with appropriate resources  Outcome: Progressing  Flowsheets (Taken 12/23/2024 2000 by Bobby Ballard, RN)  Discharge to home or other facility with appropriate resources: Identify barriers to discharge with patient and caregiver  Note: He is from home and plans on returning there at discharge.       Problem: Safety - Adult  Goal: Free from fall injury  Outcome: Progressing  Flowsheets (Taken 12/25/2024 1117)  Free From Fall Injury: Instruct family/caregiver on patient safety  Note: Bed locked & in low position, call light in reach, side-rails up x2, bed/chair alarm utilized, non-slip socks on when ambulating, reminded patient to use call light to call for assistance.     Problem: Risk for Elopement  Goal: Patient will not exit the unit/facility without proper excort  Outcome: Progressing  Flowsheets (Taken 12/23/2024 2300 by Bobby Ballard, RN)  Nursing Interventions for Elopement Risk:   Collaborate with family members/caregivers to mitigate the elopement risk   Collaborate with treatment team for drug withdrawal symptoms treatment   Assist with personal care needs such as toileting, eating, dressing, as needed to reduce the risk of wandering  Note: There are no signs so far that he is trying to leave before being discharged.     Care plan reviewed with patient.  Patient verbalizes understanding of the care plan and contributed  to goal setting.

## 2024-12-25 NOTE — PROGRESS NOTES
Hospitalist Progress Note    Patient:  Trav Jennings      Unit/Bed:3B-37/037-A    YOB: 1965    MRN: [id number]       Acct: [id number]     PCP: Radha Hou APRN - CNP    Date of Admission: 12/23/2024    Active Hospital Problems    Diagnosis Date Noted    Atrial fibrillation with RVR (HCC) [I48.91] 12/23/2024    Atrial flutter (HCC) [I48.92]      Assessment/Plan:    # Atrial Fibrillation with Rapid Ventricular Response, Recurrent  - Hx DCCV 03/21/2018  - CHADVASC- 6; patient started on heparin infusion in ER  - TSH WNL; LFT WNL; patient started on amiodarone infusion on admission, still in A. Fib today   - Continuous telemetry; Keep Mg>2, K>4  - 12/06/2024 ECHO EF 60-65% with severe concentric hypertrophy and abnormal diastolic function  - Cardiology consulted, plan for ALEISHA/CV tomorrow morning      # Chronic Hypoxic Respiratory Failure  - Patient endorses utilizing 2-4 L of oxygen with exertion  - Titrate to maintain SpO2 92 to 96%  - Multifactorial: Obstructive Lung Disease, Volume Overload, Physical Deconditioning  - Consideration for Home O2 Eval prior to discharge   - Consideration for Bedside spirometry prior to discharge     # Troponin Elevation  - Suspect secondary to demand; confounded by nephrologic dysfunction  - Patient denies chest pain/tightness/pressure or anginal equivalent  - ECG showing atrial fibrillation with RVR  - Heparin infusion started  - Cardiology consulted for advanced care recommendations      # ESRD  -T/Th/Sat Schedule  - Patient endorses anuria  - Nephrology consulted for advanced care recommendations     # Hypomagnesemia  - Repleted      # Resistant Hypertension  - With secondary hypertension secondary to REZA and nephrologic disease  - Patient's home isosorbide mononitrate, clonidine, doxazosin, hydralazine, amlodipine resumed with hold parameters      Chronic:  GERD  Unspecified Depressive Disorder/PTSD; on sertraline/clonidine/doxazosin  Chronic Macrocytic

## 2024-12-25 NOTE — PROGRESS NOTES
Cardiology Progress Note      Patient:  Trav Jennings  YOB: 1965  MRN: [id number]   Acct: [id number]  Admit Date:  12/23/2024  Primary Cardiologist: Antonina Kaur MD    Per Dr Gomes's consult note  CHIEF COMPLAINT: tachycardia        HPI: This is a pleasant 59 y.o. male presents with to the ED with hypoxia and tachycardia.  Noted worsening tachycardia after dialysis session.  Patient is ESRD patient on dialysis Tuesday/Thursday/Saturday.  Patient noted to be in atrial fibrillation/flutter with RVR in the emergency department.  Patient does have a history of this and required DCCV back in 2018.  Does not appear to be on any rate/rhythm controlling medications and not on any anticoagulations either.  Unclear etiology, may be hypervolemia in setting of ESRD.  May have been precipitated by upper respiratory infection as patient was complaining of headaches, cough, and yellow sputum production.  Also noticed an increase bilateral lower extremity edema. Cardiology was consulted give the afib and multiple medical comorbidities.         Echo:   TTE complete 12/6/2024  EF 60 to 65%, left ventricular size normal, moderately increased wall thickness, severe concentric hypertrophy, normal wall motion, abnormal diastolic function  Mild mitral valve regurgitation  Mild tricuspid valve regurgitation  Left atrium mildly dilated     Stress Test:  Lexiscan 12/6/2024  Moderate severity left ventricular stress perfusion defect medium in size, inferior and inferolateral segments, partially reversible  Appears to be probable artifact caused by soft tissue    Subjective (Events in last 24 hours):   Pt remains in Afib.  On Amio and heparin gtt.  No BB at  this time due to h/o cocaine use.  Denies any CP or SOB.    Objective:   /69   Pulse (!) 119   Temp 98.1 °F (36.7 °C) (Oral)   Resp 20   Ht 1.905 m (6' 3\")   Wt 94.9 kg (209 lb 3.5 oz)   SpO2 99%   BMI 26.15 kg/m²        All labs, EKG's, diagnostic testing  FELICIANO      Age            57 year(s)    Sonographer          Jorge Garcia, JF,                                                     RVT                                   Interpreting         Echo reader of the week                                Physician            Tori Allen MD     Procedure    Type of Study      TTE procedure:ECHOCARDIOGRAM COMPLETE 2D W DOPPLER W COLOR.     Procedure Date  Date: 09/29/2022 Start: 03:51 PM    Study Location: Bedside  Technical Quality: Limited visualization due to restricted mobility.    Indications:Volume Overload.    Additional Medical History:abdominal aortic aneurysm, acute kidney injury,  anemia, arthritis, cocaine abuse, diabetes, hypertension, hyperlipidemia,  sleep apnea, pleural effusion    Patient Status: Routine    Weight: 250.01 pounds    BP: 107/61 mmHg    Allergies    - See Epic.     Conclusions      Summary   Left ventricle size is normal.   Severely increased left ventricle wall thickness.   Severe concentric left ventricular hypertrophy.   Systolic function was normal.   Ejection fraction is visually estimated at 60%.   The left atrium is Moderately dilated.   Moderately enlarged right atrium size.   Mild systolic anterior motion (CASSIE) of anterior leaflet.   Moderate LVOT Obstruction   Moderately elevated left ventricular outflow tract velocity 3 m/sec .   Moderately elevated Peak left ventricular outflow tract gradient of 36   mmhg .      Signature      ----------------------------------------------------------------   Electronically signed by Tori Allen MD (Interpreting   physician) on 09/29/2022 at 06:56 PM   ----------------------------------------------------------------      Findings      Mitral Valve   The mitral valve structure was normal with normal leaflet separation.   DOPPLER: The transmitral velocity was within the normal range with no   evidence for mitral stenosis.   Mild mitral regurgitation is present.   Mild systolic anterior motion

## 2024-12-26 ENCOUNTER — APPOINTMENT (OUTPATIENT)
Age: 59
End: 2024-12-26
Attending: PHYSICIAN ASSISTANT
Payer: MEDICARE

## 2024-12-26 ENCOUNTER — APPOINTMENT (OUTPATIENT)
Age: 59
End: 2024-12-26
Attending: INTERNAL MEDICINE
Payer: MEDICARE

## 2024-12-26 LAB
ANION GAP SERPL CALC-SCNC: 14 MEQ/L (ref 8–16)
BASOPHILS ABSOLUTE: 0 THOU/MM3 (ref 0–0.1)
BASOPHILS NFR BLD AUTO: 0.5 %
BUN SERPL-MCNC: 49 MG/DL (ref 7–22)
CALCIUM SERPL-MCNC: 8.3 MG/DL (ref 8.5–10.5)
CHLORIDE SERPL-SCNC: 95 MEQ/L (ref 98–111)
CO2 SERPL-SCNC: 23 MEQ/L (ref 23–33)
CREAT SERPL-MCNC: 7 MG/DL (ref 0.4–1.2)
DEPRECATED RDW RBC AUTO: 52.2 FL (ref 35–45)
EKG ATRIAL RATE: 123 BPM
EKG ATRIAL RATE: 79 BPM
EKG P AXIS: 43 DEGREES
EKG P-R INTERVAL: 140 MS
EKG P-R INTERVAL: 230 MS
EKG Q-T INTERVAL: 382 MS
EKG Q-T INTERVAL: 442 MS
EKG QRS DURATION: 110 MS
EKG QRS DURATION: 128 MS
EKG QTC CALCULATION (BAZETT): 506 MS
EKG QTC CALCULATION (BAZETT): 546 MS
EKG R AXIS: -43 DEGREES
EKG R AXIS: -45 DEGREES
EKG T AXIS: 87 DEGREES
EKG T AXIS: 95 DEGREES
EKG VENTRICULAR RATE: 123 BPM
EKG VENTRICULAR RATE: 79 BPM
EOSINOPHIL NFR BLD AUTO: 3.2 %
EOSINOPHILS ABSOLUTE: 0.1 THOU/MM3 (ref 0–0.4)
ERYTHROCYTE [DISTWIDTH] IN BLOOD BY AUTOMATED COUNT: 13.7 % (ref 11.5–14.5)
GFR SERPL CREATININE-BSD FRML MDRD: 8 ML/MIN/1.73M2
GLUCOSE SERPL-MCNC: 89 MG/DL (ref 70–108)
HCT VFR BLD AUTO: 33 % (ref 42–52)
HEPARIN UNFRACTIONATED: 0.3 U/ML (ref 0.3–0.7)
HEPARIN UNFRACTIONATED: < 0.04 U/ML (ref 0.3–0.7)
HGB BLD-MCNC: 10 GM/DL (ref 14–18)
IMM GRANULOCYTES # BLD AUTO: 0.01 THOU/MM3 (ref 0–0.07)
IMM GRANULOCYTES NFR BLD AUTO: 0.2 %
INR PPP: 1.05 (ref 0.85–1.13)
LYMPHOCYTES ABSOLUTE: 0.8 THOU/MM3 (ref 1–4.8)
LYMPHOCYTES NFR BLD AUTO: 17.9 %
MAGNESIUM SERPL-MCNC: 2.5 MG/DL (ref 1.6–2.4)
MCH RBC QN AUTO: 31.6 PG (ref 26–33)
MCHC RBC AUTO-ENTMCNC: 30.3 GM/DL (ref 32.2–35.5)
MCV RBC AUTO: 104.4 FL (ref 80–94)
MONOCYTES ABSOLUTE: 0.5 THOU/MM3 (ref 0.4–1.3)
MONOCYTES NFR BLD AUTO: 10.6 %
NEUTROPHILS ABSOLUTE: 3 THOU/MM3 (ref 1.8–7.7)
NEUTROPHILS NFR BLD AUTO: 67.6 %
NRBC BLD AUTO-RTO: 0 /100 WBC
PLATELET # BLD AUTO: 117 THOU/MM3 (ref 130–400)
PMV BLD AUTO: 11 FL (ref 9.4–12.4)
POTASSIUM SERPL-SCNC: 5.1 MEQ/L (ref 3.5–5.2)
RBC # BLD AUTO: 3.16 MILL/MM3 (ref 4.7–6.1)
SODIUM SERPL-SCNC: 132 MEQ/L (ref 135–145)
WBC # BLD AUTO: 4.4 THOU/MM3 (ref 4.8–10.8)

## 2024-12-26 PROCEDURE — 85610 PROTHROMBIN TIME: CPT

## 2024-12-26 PROCEDURE — 94010 BREATHING CAPACITY TEST: CPT

## 2024-12-26 PROCEDURE — 6360000002 HC RX W HCPCS: Performed by: NURSE PRACTITIONER

## 2024-12-26 PROCEDURE — 93005 ELECTROCARDIOGRAM TRACING: CPT | Performed by: INTERNAL MEDICINE

## 2024-12-26 PROCEDURE — 90935 HEMODIALYSIS ONE EVALUATION: CPT | Performed by: INTERNAL MEDICINE

## 2024-12-26 PROCEDURE — 5A2204Z RESTORATION OF CARDIAC RHYTHM, SINGLE: ICD-10-PCS | Performed by: INTERNAL MEDICINE

## 2024-12-26 PROCEDURE — 92960 CARDIOVERSION ELECTRIC EXT: CPT

## 2024-12-26 PROCEDURE — B24BZZ4 ULTRASONOGRAPHY OF HEART WITH AORTA, TRANSESOPHAGEAL: ICD-10-PCS | Performed by: INTERNAL MEDICINE

## 2024-12-26 PROCEDURE — 99152 MOD SED SAME PHYS/QHP 5/>YRS: CPT | Performed by: INTERNAL MEDICINE

## 2024-12-26 PROCEDURE — 83735 ASSAY OF MAGNESIUM: CPT

## 2024-12-26 PROCEDURE — 6370000000 HC RX 637 (ALT 250 FOR IP)

## 2024-12-26 PROCEDURE — 85520 HEPARIN ASSAY: CPT

## 2024-12-26 PROCEDURE — 2140000000 HC CCU INTERMEDIATE R&B

## 2024-12-26 PROCEDURE — 2500000003 HC RX 250 WO HCPCS

## 2024-12-26 PROCEDURE — 93312 ECHO TRANSESOPHAGEAL: CPT

## 2024-12-26 PROCEDURE — 6360000002 HC RX W HCPCS: Performed by: INTERNAL MEDICINE

## 2024-12-26 PROCEDURE — 99232 SBSQ HOSP IP/OBS MODERATE 35: CPT

## 2024-12-26 PROCEDURE — 6360000002 HC RX W HCPCS

## 2024-12-26 PROCEDURE — 93010 ELECTROCARDIOGRAM REPORT: CPT | Performed by: INTERNAL MEDICINE

## 2024-12-26 PROCEDURE — 6370000000 HC RX 637 (ALT 250 FOR IP): Performed by: INTERNAL MEDICINE

## 2024-12-26 PROCEDURE — 90935 HEMODIALYSIS ONE EVALUATION: CPT

## 2024-12-26 PROCEDURE — 80048 BASIC METABOLIC PNL TOTAL CA: CPT

## 2024-12-26 PROCEDURE — 6370000000 HC RX 637 (ALT 250 FOR IP): Performed by: NURSE PRACTITIONER

## 2024-12-26 PROCEDURE — 85025 COMPLETE CBC W/AUTO DIFF WBC: CPT

## 2024-12-26 PROCEDURE — 6370000000 HC RX 637 (ALT 250 FOR IP): Performed by: PHYSICIAN ASSISTANT

## 2024-12-26 PROCEDURE — 36415 COLL VENOUS BLD VENIPUNCTURE: CPT

## 2024-12-26 RX ORDER — WARFARIN SODIUM 7.5 MG/1
7.5 TABLET ORAL
Status: DISPENSED | OUTPATIENT
Start: 2024-12-26 | End: 2024-12-27

## 2024-12-26 RX ORDER — HEPARIN SODIUM 1000 [USP'U]/ML
2000 INJECTION, SOLUTION INTRAVENOUS; SUBCUTANEOUS PRN
Status: DISCONTINUED | OUTPATIENT
Start: 2024-12-26 | End: 2024-12-26 | Stop reason: SDUPTHER

## 2024-12-26 RX ORDER — METOPROLOL SUCCINATE 25 MG/1
25 TABLET, EXTENDED RELEASE ORAL DAILY
Status: DISCONTINUED | OUTPATIENT
Start: 2024-12-26 | End: 2024-12-27 | Stop reason: HOSPADM

## 2024-12-26 RX ORDER — FENTANYL CITRATE 50 UG/ML
INJECTION, SOLUTION INTRAMUSCULAR; INTRAVENOUS PRN
Status: COMPLETED | OUTPATIENT
Start: 2024-12-26 | End: 2024-12-26

## 2024-12-26 RX ORDER — HEPARIN SODIUM 10000 [USP'U]/100ML
5-30 INJECTION, SOLUTION INTRAVENOUS CONTINUOUS
Status: DISCONTINUED | OUTPATIENT
Start: 2024-12-26 | End: 2024-12-26

## 2024-12-26 RX ORDER — HEPARIN SODIUM 1000 [USP'U]/ML
2000 INJECTION, SOLUTION INTRAVENOUS; SUBCUTANEOUS PRN
Status: DISCONTINUED | OUTPATIENT
Start: 2024-12-26 | End: 2024-12-27

## 2024-12-26 RX ORDER — HEPARIN SODIUM 1000 [USP'U]/ML
4000 INJECTION, SOLUTION INTRAVENOUS; SUBCUTANEOUS PRN
Status: DISCONTINUED | OUTPATIENT
Start: 2024-12-26 | End: 2024-12-27

## 2024-12-26 RX ORDER — MIDAZOLAM HYDROCHLORIDE 1 MG/ML
INJECTION, SOLUTION INTRAMUSCULAR; INTRAVENOUS PRN
Status: COMPLETED | OUTPATIENT
Start: 2024-12-26 | End: 2024-12-26

## 2024-12-26 RX ORDER — HEPARIN SODIUM 10000 [USP'U]/100ML
5-30 INJECTION, SOLUTION INTRAVENOUS CONTINUOUS
Status: DISCONTINUED | OUTPATIENT
Start: 2024-12-26 | End: 2024-12-27

## 2024-12-26 RX ORDER — HEPARIN SODIUM 1000 [USP'U]/ML
4000 INJECTION, SOLUTION INTRAVENOUS; SUBCUTANEOUS PRN
Status: DISCONTINUED | OUTPATIENT
Start: 2024-12-26 | End: 2024-12-26 | Stop reason: SDUPTHER

## 2024-12-26 RX ORDER — AMIODARONE HYDROCHLORIDE 200 MG/1
200 TABLET ORAL DAILY
Status: DISCONTINUED | OUTPATIENT
Start: 2024-12-26 | End: 2024-12-27 | Stop reason: HOSPADM

## 2024-12-26 RX ADMIN — ISOSORBIDE MONONITRATE 120 MG: 60 TABLET, EXTENDED RELEASE ORAL at 20:52

## 2024-12-26 RX ADMIN — AMIODARONE HYDROCHLORIDE 0.5 MG/MIN: 1.8 INJECTION, SOLUTION INTRAVENOUS at 05:40

## 2024-12-26 RX ADMIN — Medication 100 MG: at 12:57

## 2024-12-26 RX ADMIN — BENZONATATE 200 MG: 100 CAPSULE ORAL at 12:56

## 2024-12-26 RX ADMIN — METOPROLOL SUCCINATE 25 MG: 25 TABLET, FILM COATED, EXTENDED RELEASE ORAL at 15:53

## 2024-12-26 RX ADMIN — ATORVASTATIN CALCIUM 40 MG: 40 TABLET, FILM COATED ORAL at 20:52

## 2024-12-26 RX ADMIN — FENTANYL CITRATE 25 MCG: 50 INJECTION, SOLUTION INTRAMUSCULAR; INTRAVENOUS at 13:58

## 2024-12-26 RX ADMIN — AMLODIPINE BESYLATE 10 MG: 10 TABLET ORAL at 12:55

## 2024-12-26 RX ADMIN — MIDAZOLAM 0.5 MG: 1 INJECTION INTRAMUSCULAR; INTRAVENOUS at 13:59

## 2024-12-26 RX ADMIN — BENZONATATE 200 MG: 100 CAPSULE ORAL at 20:53

## 2024-12-26 RX ADMIN — MIDAZOLAM 1 MG: 1 INJECTION INTRAMUSCULAR; INTRAVENOUS at 13:53

## 2024-12-26 RX ADMIN — MIDAZOLAM 0.5 MG: 1 INJECTION INTRAMUSCULAR; INTRAVENOUS at 13:58

## 2024-12-26 RX ADMIN — GUAIFENESIN SYRUP AND DEXTROMETHORPHAN 5 ML: 100; 10 SYRUP ORAL at 12:56

## 2024-12-26 RX ADMIN — MIDAZOLAM 2 MG: 1 INJECTION INTRAMUSCULAR; INTRAVENOUS at 13:50

## 2024-12-26 RX ADMIN — GUAIFENESIN SYRUP AND DEXTROMETHORPHAN 5 ML: 100; 10 SYRUP ORAL at 20:52

## 2024-12-26 RX ADMIN — BENZONATATE 200 MG: 100 CAPSULE ORAL at 03:52

## 2024-12-26 RX ADMIN — ASPIRIN 81 MG: 81 TABLET, COATED ORAL at 12:56

## 2024-12-26 RX ADMIN — AMIODARONE HYDROCHLORIDE 200 MG: 200 TABLET ORAL at 15:53

## 2024-12-26 RX ADMIN — FENTANYL CITRATE 50 MCG: 50 INJECTION, SOLUTION INTRAMUSCULAR; INTRAVENOUS at 13:50

## 2024-12-26 RX ADMIN — SODIUM CHLORIDE, PRESERVATIVE FREE 10 ML: 5 INJECTION INTRAVENOUS at 20:53

## 2024-12-26 RX ADMIN — CINACALCET HYDROCHLORIDE 90 MG: 30 TABLET, FILM COATED ORAL at 12:56

## 2024-12-26 RX ADMIN — SERTRALINE HYDROCHLORIDE 150 MG: 50 TABLET ORAL at 12:57

## 2024-12-26 RX ADMIN — HEPARIN SODIUM 9 UNITS/KG/HR: 10000 INJECTION, SOLUTION INTRAVENOUS at 20:51

## 2024-12-26 RX ADMIN — HEPARIN SODIUM 19 UNITS/KG/HR: 10000 INJECTION, SOLUTION INTRAVENOUS at 10:47

## 2024-12-26 NOTE — PROGRESS NOTES
[facility]   Nephrology progress note  12/26/2024, 10:40 AM      Pt Name:    Trav Jennings  MRN:     [id number]     YOB: 1965  Admit Date:    12/23/2024 12:02 PM    Chief Complaint: Nephrology following for ESRD on hemodialysis.    Subjective:  Patient seen and examined  No chest pain or shortness of breath  Complain of some swelling of the left ankle    Objective:  24HR INTAKE/OUTPUT:  No intake or output data in the 24 hours ending 12/26/24 1040     Admission weight: 103 kg (227 lb 1.2 oz)  Wt Readings from Last 3 Encounters:   12/26/24 97.4 kg (214 lb 11.7 oz)   12/07/24 96 kg (211 lb 10.3 oz)   09/30/24 94.3 kg (207 lb 14.3 oz)        Vitals :   Vitals:    12/25/24 2054 12/25/24 2338 12/26/24 0340 12/26/24 0758   BP: 132/76 130/78 126/76 133/72   Pulse: (!) 116 100 88 (!) 109   Resp: 18 17 17 26   Temp: 98.1 °F (36.7 °C) 98.3 °F (36.8 °C)  97.5 °F (36.4 °C)   TempSrc: Oral Oral Oral    SpO2: 98% 96% 94% 96%   Weight:    97.4 kg (214 lb 11.7 oz)   Height:           Physical examination  General Appearance:  Well developed. No distress  Mouth/Throat:  Oral mucosa moist  Neck:  Supple, no JVD  Lungs:  Breath sounds: clear  Heart::  S1,S2 heard  Abdomen:  Soft, non - tender  Musculoskeletal:  Edema -none mild tenderness and a small swelling noted    Medications:  Infusion:    heparin (PORCINE) Infusion 19 Units/kg/hr (12/25/24 8428)    sodium chloride      amiodarone 0.5 mg/min (12/26/24 0540)     Meds:    cinacalcet  90 mg Oral Once per day on Tuesday Thursday Saturday    sodium chloride  250 mL IntraVENous Once    sodium chloride flush  5-40 mL IntraVENous 2 times per day    amLODIPine  10 mg Oral Daily    aspirin  81 mg Oral Daily    atorvastatin  40 mg Oral Nightly    calcitRIOL  0.5 mcg Oral Once per day on Monday Wednesday Friday    cloNIDine  0.3 mg Oral TID    doxazosin  4 mg Oral 2 times per day    hydrALAZINE  100 mg Oral q8h    isosorbide mononitrate  120 mg Oral BID

## 2024-12-26 NOTE — PLAN OF CARE
Problem: Chronic Conditions and Co-morbidities  Goal: Patient's chronic conditions and co-morbidity symptoms are monitored and maintained or improved  12/25/2024 2242 by Samantha Lopes, RN  Outcome: Progressing  Flowsheets (Taken 12/25/2024 2242)  Care Plan - Patient's Chronic Conditions and Co-Morbidity Symptoms are Monitored and Maintained or Improved:   Monitor and assess patient's chronic conditions and comorbid symptoms for stability, deterioration, or improvement   Collaborate with multidisciplinary team to address chronic and comorbid conditions and prevent exacerbation or deterioration     Problem: Discharge Planning  Goal: Discharge to home or other facility with appropriate resources  12/25/2024 2242 by Samantha Lopes, RN  Outcome: Progressing  Flowsheets (Taken 12/25/2024 2242)  Discharge to home or other facility with appropriate resources:   Identify barriers to discharge with patient and caregiver   Arrange for needed discharge resources and transportation as appropriate     Problem: Self Harm/Suicidality  Goal: Will have no self-injury during hospital stay  Description: INTERVENTIONS:  1.  Ensure constant observer at bedside with Q15M safety checks  2.  Maintain a safe environment  3.  Secure patient belongings  4.  Ensure family/visitors adhere to safety recommendations  5.  Ensure safety tray has been added to patient's diet order  6.  Every shift and PRN: Re-assess suicidal risk via Frequent Screener    Outcome: Progressing  Flowsheets (Taken 12/25/2024 2242)  Will have no self-injury during hospital stay:   Maintain a safe environment   Secure patient belongings     Problem: Safety - Adult  Goal: Free from fall injury  12/25/2024 2242 by Samantha Lopes, RN  Outcome: Progressing  Flowsheets (Taken 12/25/2024 2242)  Free From Fall Injury: Instruct family/caregiver on patient safety     Problem: Risk for Elopement  Goal: Patient will not exit the unit/facility without proper excort  12/25/2024

## 2024-12-26 NOTE — PROGRESS NOTES
Warfarin Pharmacy Consult Note    Trav Jennings is a 59 y.o. male for whom pharmacy has been asked to manage warfarin therapy.     Consulting Provider: Symone Kaiser  Indication: Atrial fibrillation  Target INR: 2.0-3.0   Warfarin dose prior to admission: N/A (new start)  Outpatient warfarin provider: N/A (new start)    Recent Labs     12/24/24  0436 12/25/24  0704 12/26/24  0401   HGB 10.0* 10.9* 10.0*   * 120* 117*     Recent Labs     12/26/24  1646   INR 1.05     Concurrent anticoagulants/antiplatelets: none at this time (heparin infusion stopped 12/26 @ 1644)  Significant warfarin drug-drug interactions: amiodarone, trazodone, sertraline    Date INR Warfarin Dose   12/26/24 1.05 7.5 mg                                   INR will be monitored routinely until therapeutic INR is achieved.    Pharmacy will continue to follow. Thank you for the consult,   Cassie Rodriguez, PharmD  12/26/2024, 6:24 PM

## 2024-12-26 NOTE — PROGRESS NOTES
normal. Oral mucosa moist.  Hearing intact.   Neck: Supple, with full range of motion. Trachea midline.  No gross JVD appreciated.  Respiratory:  Normal effort. Clear to auscultation, without rales or wheezes or rhonchi.  Cardiovascular: Normal rate, regular rhythm with normal S1/S2 without murmurs.    No lower extremity edema.   Abdomen: Soft, non-tender, non-distended with normal bowel sounds.  Musculoskeletal: No joint swelling or tenderness. Normal tone. No abnormal movements.   Skin: Warm and dry. No rashes or lesions.  Neurologic:  No focal sensory/motor deficits in the upper or lower extremities. Cranial nerves:  grossly non-focal 2-12.     Psychiatric: Alert and oriented, normal insight and thought content.   Capillary Refill: Brisk,< 3 seconds.  Peripheral Pulses: +2 palpable, equal bilaterally.       Labs/Radiology: See chart or assessment above.     Electronically signed by Demarco Cramer MD on 12/26/2024 at 3:36 PM

## 2024-12-26 NOTE — PROGRESS NOTES
Spiritual Health History and Assessment/Progress Note  [facility]    (P) Loneliness/Social Isolation,  ,  ,      Name: Trav Jennings MRN: [id number]    Age: 59 y.o.     Sex: male   Language: English   Orthodox: Mandaeism   Atrial fibrillation with RVR ([facility])     Date: 12/26/2024            Total Time Calculated: (P) 6 min              Spiritual Assessment began in [facility]B        Referral/Consult From: Rounding   Encounter Overview/Reason: (P) Loneliness/Social Isolation  Service Provided For: Patient and family together    Elizabeth, Belief, Meaning:   Patient identifies as spiritual  Family/Friends identify as spiritual      Importance and Influence:  Patient has spiritual/personal beliefs that influence decisions regarding their health  Family/Friends have spiritual/personal beliefs that influence decisions regarding the patient's health    Community:  Patient Other: Not connected to a spiritual community at this time though he is a Rastafari  Family/Friends Other: Not at this moment    Assessment and Plan of Care:   Trav is a 59 year old male admitted on 3B for Atrial Fibrillation with RVR. Patient and his spouse are calm, hopeful, coping and approachable. Patient shared with me that his test results are coming in good and said this is his second Atrial Fibrillation. Patient is approachable and hope to be discharged from the hospital soon. Patient is aware that spiritual Health  staff will follow up for continue assessment and additional spiritual and emotional support.        Patient Interventions include: Facilitated expression of thoughts and feelings  Family/Friends Interventions include: Facilitated expression of thoughts and feelings    Patient Plan of Care: Spiritual Care available upon further referral  Family/Friends Plan of Care: Spiritual Care available upon further referral    Electronically signed by FLAVIA Peters on 12/26/2024 at 5:10 PM

## 2024-12-26 NOTE — FLOWSHEET NOTE
12/26/24 0758 12/26/24 1206   Vital Signs   /72 118/68   Temp 97.5 °F (36.4 °C) 98 °F (36.7 °C)   Pulse (!) 109 (!) 115   Respirations 26 16   SpO2 96 % 98 %   Weight - Scale 97.4 kg (214 lb 11.7 oz) 95.4 kg (210 lb 5.1 oz)   Weight Method Standing scale  --    Percent Weight Change 2.63 -2.05   Post-Hemodialysis Assessment   Post-Treatment Procedures  --  Blood returned;Access bleeding time < 10 minutes   Machine Disinfection Process  --  Acid/Vinegar Clean;Heat Disinfect;Exterior Machine Disinfection   Blood Volume Processed (Liters)  --  106.8 L   Dialyzer Clearance  --  Lightly streaked   Duration of Treatment (minutes)  --  240 minutes   Heparin Amount Administered During Treatment (mL)  --  0 mL   Hemodialysis Intake (ml)  --  300 ml   Hemodialysis Output (ml)  --  2316 ml   NET Removed (ml)  --  2016     4 hour treatment completed.  2L of fluid removed. Pressure held to access for 10 mins x2. Dressing clean/dry and intact upon leaving the unit. Report given to primary RN. Charting printed and placed in bin to be scanned into EMR.

## 2024-12-27 VITALS
SYSTOLIC BLOOD PRESSURE: 111 MMHG | TEMPERATURE: 98.2 F | HEART RATE: 67 BPM | BODY MASS INDEX: 26.15 KG/M2 | WEIGHT: 210.32 LBS | HEIGHT: 75 IN | OXYGEN SATURATION: 100 % | RESPIRATION RATE: 18 BRPM | DIASTOLIC BLOOD PRESSURE: 57 MMHG

## 2024-12-27 PROBLEM — Z01.89 ENCOUNTER FOR CARDIOVERSION PROCEDURE: Status: ACTIVE | Noted: 2024-12-27

## 2024-12-27 LAB
ANION GAP SERPL CALC-SCNC: 13 MEQ/L (ref 8–16)
BUN SERPL-MCNC: 37 MG/DL (ref 7–22)
CALCIUM SERPL-MCNC: 8.8 MG/DL (ref 8.5–10.5)
CHLORIDE SERPL-SCNC: 98 MEQ/L (ref 98–111)
CO2 SERPL-SCNC: 26 MEQ/L (ref 23–33)
CREAT SERPL-MCNC: 6.4 MG/DL (ref 0.4–1.2)
DEPRECATED RDW RBC AUTO: 53.3 FL (ref 35–45)
ERYTHROCYTE [DISTWIDTH] IN BLOOD BY AUTOMATED COUNT: 14 % (ref 11.5–14.5)
GFR SERPL CREATININE-BSD FRML MDRD: 9 ML/MIN/1.73M2
GLUCOSE SERPL-MCNC: 95 MG/DL (ref 70–108)
HCT VFR BLD AUTO: 34.6 % (ref 42–52)
HEPARIN UNFRACTIONATED: 0.05 U/ML (ref 0.3–0.7)
HEPARIN UNFRACTIONATED: 0.21 U/ML (ref 0.3–0.7)
HGB BLD-MCNC: 10.7 GM/DL (ref 14–18)
INR PPP: 1.02 (ref 0.85–1.13)
MCH RBC QN AUTO: 32.3 PG (ref 26–33)
MCHC RBC AUTO-ENTMCNC: 30.9 GM/DL (ref 32.2–35.5)
MCV RBC AUTO: 104.5 FL (ref 80–94)
PLATELET # BLD AUTO: 129 THOU/MM3 (ref 130–400)
PMV BLD AUTO: 10.4 FL (ref 9.4–12.4)
POTASSIUM SERPL-SCNC: 4.7 MEQ/L (ref 3.5–5.2)
RBC # BLD AUTO: 3.31 MILL/MM3 (ref 4.7–6.1)
SODIUM SERPL-SCNC: 137 MEQ/L (ref 135–145)
WBC # BLD AUTO: 6 THOU/MM3 (ref 4.8–10.8)

## 2024-12-27 PROCEDURE — 6370000000 HC RX 637 (ALT 250 FOR IP)

## 2024-12-27 PROCEDURE — 6370000000 HC RX 637 (ALT 250 FOR IP): Performed by: INTERNAL MEDICINE

## 2024-12-27 PROCEDURE — 99232 SBSQ HOSP IP/OBS MODERATE 35: CPT | Performed by: NURSE PRACTITIONER

## 2024-12-27 PROCEDURE — 6360000002 HC RX W HCPCS: Performed by: NURSE PRACTITIONER

## 2024-12-27 PROCEDURE — 99239 HOSP IP/OBS DSCHRG MGMT >30: CPT

## 2024-12-27 PROCEDURE — 6370000000 HC RX 637 (ALT 250 FOR IP): Performed by: NURSE PRACTITIONER

## 2024-12-27 PROCEDURE — 85520 HEPARIN ASSAY: CPT

## 2024-12-27 PROCEDURE — 85610 PROTHROMBIN TIME: CPT

## 2024-12-27 PROCEDURE — 99232 SBSQ HOSP IP/OBS MODERATE 35: CPT | Performed by: INTERNAL MEDICINE

## 2024-12-27 PROCEDURE — 85027 COMPLETE CBC AUTOMATED: CPT

## 2024-12-27 PROCEDURE — 6370000000 HC RX 637 (ALT 250 FOR IP): Performed by: PHYSICIAN ASSISTANT

## 2024-12-27 PROCEDURE — 80048 BASIC METABOLIC PNL TOTAL CA: CPT

## 2024-12-27 PROCEDURE — 36415 COLL VENOUS BLD VENIPUNCTURE: CPT

## 2024-12-27 RX ORDER — WARFARIN SODIUM 7.5 MG/1
TABLET ORAL
Qty: 30 TABLET | Refills: 0 | Status: ON HOLD | OUTPATIENT
Start: 2024-12-27

## 2024-12-27 RX ORDER — WARFARIN SODIUM 7.5 MG/1
7.5 TABLET ORAL
Status: COMPLETED | OUTPATIENT
Start: 2024-12-27 | End: 2024-12-27

## 2024-12-27 RX ORDER — AMIODARONE HYDROCHLORIDE 200 MG/1
200 TABLET ORAL DAILY
Qty: 30 TABLET | Refills: 0 | Status: ON HOLD | OUTPATIENT
Start: 2024-12-28

## 2024-12-27 RX ORDER — METOPROLOL SUCCINATE 25 MG/1
25 TABLET, EXTENDED RELEASE ORAL DAILY
Qty: 30 TABLET | Refills: 0 | Status: ON HOLD | OUTPATIENT
Start: 2024-12-28

## 2024-12-27 RX ADMIN — Medication 100 MG: at 07:50

## 2024-12-27 RX ADMIN — CALCITRIOL CAPSULES 0.25 MCG 0.5 MCG: 0.25 CAPSULE ORAL at 07:49

## 2024-12-27 RX ADMIN — SERTRALINE HYDROCHLORIDE 150 MG: 50 TABLET ORAL at 07:50

## 2024-12-27 RX ADMIN — GUAIFENESIN SYRUP AND DEXTROMETHORPHAN 5 ML: 100; 10 SYRUP ORAL at 09:57

## 2024-12-27 RX ADMIN — AMLODIPINE BESYLATE 10 MG: 10 TABLET ORAL at 07:50

## 2024-12-27 RX ADMIN — HEPARIN SODIUM 4000 UNITS: 1000 INJECTION INTRAVENOUS; SUBCUTANEOUS at 02:39

## 2024-12-27 RX ADMIN — WARFARIN SODIUM 7.5 MG: 7.5 TABLET ORAL at 13:00

## 2024-12-27 RX ADMIN — ASPIRIN 81 MG: 81 TABLET, COATED ORAL at 07:50

## 2024-12-27 RX ADMIN — AMIODARONE HYDROCHLORIDE 200 MG: 200 TABLET ORAL at 07:50

## 2024-12-27 RX ADMIN — BENZONATATE 200 MG: 100 CAPSULE ORAL at 09:57

## 2024-12-27 RX ADMIN — METOPROLOL SUCCINATE 25 MG: 25 TABLET, FILM COATED, EXTENDED RELEASE ORAL at 07:50

## 2024-12-27 RX ADMIN — ISOSORBIDE MONONITRATE 120 MG: 60 TABLET, EXTENDED RELEASE ORAL at 07:49

## 2024-12-27 NOTE — DISCHARGE SUMMARY
Hospital Medicine Discharge Summary      Patient Identification:   Trav Jennings   : 1965  MRN: [id number]   Account: [id number]   Patient's PCP: Radha Hou APRN - CNP    Admit Date: 2024   Discharge Date: 2024      Admitting Physician: No admitting provider for patient encounter.  Discharge Physician: Demarco Cramer MD       Discharge Diagnoses:  #AF RVR: [id number] 6. Recurrent issue requiring previous DCCV . Suspect hypervolemia with significant contribution. ECHO : EF 60-65%, severe concentric hypertrophy/abnl diastolic dysfunction. On amio gtt/heparin initially. Cardiology consulted, patient underwent DCCV 2024 with conversion to sinus rhythm.  Amiodarone and converted to p.o. and patient continued on Toprol.  Patient was transition back to warfarin, discussed with cardiology and no need to bridge at this time.  Of note, patient was stable and cleared by cardiology for discharge, however he did not wait to receive his warfarin prescription prior to leaving despite repeated warnings.  Patient will need follow-up with cardiology outpatient  #Chronic hypoxic respiratory failure: Respiratory status stable during admission.  Reportedly utilizing 2-4L nc O2 with exertion.  Patient unsure of who supplies oxygen prescription, though states he follows with pulmonology  #Elevated troponin: Acutely elevated in setting of suspected demand in setting of chronic elevation due to ESRD  #ESRD  #Resistant hypertension: Continue home medication  #Leukopenia: Noted, mild and appears patient intermittently with low WBCs noted previously.  No infectious signs/symptoms  Chronic Conditions (reviewed and stable unless otherwise stated)  #GERD  #Depression/PTSD: Zoloft/Clonidine/Doxazosin  #Chronic macrocytic anemia: Stable  #HLD: Statin  #Tobacco use disorder: patch prn  #Hx TIA: ASA  #Hx cocaine use disorder  #Hx Type B aortic dissection  #Hx AAA: S/p aortic stent   #Hx R trapped

## 2024-12-27 NOTE — PLAN OF CARE
Problem: Chronic Conditions and Co-morbidities  Goal: Patient's chronic conditions and co-morbidity symptoms are monitored and maintained or improved  Outcome: Progressing  Flowsheets (Taken 12/27/2024 0028)  Care Plan - Patient's Chronic Conditions and Co-Morbidity Symptoms are Monitored and Maintained or Improved:   Monitor and assess patient's chronic conditions and comorbid symptoms for stability, deterioration, or improvement   Collaborate with multidisciplinary team to address chronic and comorbid conditions and prevent exacerbation or deterioration     Problem: Discharge Planning  Goal: Discharge to home or other facility with appropriate resources  Outcome: Progressing     Problem: Self Harm/Suicidality  Goal: Will have no self-injury during hospital stay  Description: INTERVENTIONS:  1.  Ensure constant observer at bedside with Q15M safety checks  2.  Maintain a safe environment  3.  Secure patient belongings  4.  Ensure family/visitors adhere to safety recommendations  5.  Ensure safety tray has been added to patient's diet order  6.  Every shift and PRN: Re-assess suicidal risk via Frequent Screener    Outcome: Progressing  Flowsheets (Taken 12/27/2024 0028)  Will have no self-injury during hospital stay: Maintain a safe environment     Problem: Safety - Adult  Goal: Free from fall injury  Outcome: Progressing  Flowsheets (Taken 12/27/2024 0028)  Free From Fall Injury: Instruct family/caregiver on patient safety     Problem: Risk for Elopement  Goal: Patient will not exit the unit/facility without proper excort  Outcome: Progressing  Flowsheets (Taken 12/27/2024 0028)  Nursing Interventions for Elopement Risk:   Assist with personal care needs such as toileting, eating, dressing, as needed to reduce the risk of wandering   Collaborate with family members/caregivers to mitigate the elopement risk   Make sure patient has all necessary personal care items     Problem: ABCDS Injury Assessment  Goal: Absence  of physical injury  Outcome: Progressing  Flowsheets (Taken 12/27/2024 0028)  Absence of Physical Injury: Implement safety measures based on patient assessment    Care plan reviewed with patient and wife Crystal  Patient verbalizes understanding of the care plan and contributed to goal setting.

## 2024-12-27 NOTE — FLOWSHEET NOTE
Pt. Refused coumadin. Pt stated: \"I dont want any of that crap, I already tolerate the Heparin for dialysis, I dont want any more that is going to make me bleed more than I already do. They have been telling me your years about my risk for a stroke, Id rather deal with the clot than take that pill.\" Education was given by this RN and Charge nurse. Pt still declined coumadin.    Heparin gtt was restarted but took convincing and education To restart the heparin gtt.

## 2024-12-27 NOTE — PROGRESS NOTES
Warfarin Pharmacy Consult Note    Warfarin Indication: Atrial fibrillation  Target INR: 2.0-3.0  Dose prior to admission: new start    Recent Labs     12/25/24  0704 12/26/24  0401 12/27/24  0744   HGB 10.9* 10.0* 10.7*   * 117* 129*     Recent Labs     12/26/24  1646 12/27/24  0642   INR 1.05 1.02     oncurrent anticoagulants/antiplatelets: aspirin  Significant warfarin drug-drug interactions: amiodarone, trazodone, sertraline     Date INR Warfarin Dose   12/26/24 1.05 7.5 mg    12/27/24 1.02  7.5 mg                                                Monitoring:                   INR will be monitored daily.    **Please contact pharmacy for discharge instructions when indicated**    Yonas Miller [facility] 12/27/2024 8:36 AM

## 2024-12-27 NOTE — PROGRESS NOTES
Cardiology Progress Note      Patient:  Trav Jennings  YOB: 1965  MRN: [id number]   Acct: [id number]  Admit Date:  12/23/2024  Primary Cardiologist: none   Seen by Dr. Gomes    Per prior cardiology consult note-  CHIEF COMPLAINT: tachycardia        HPI: This is a pleasant 59 y.o. male presents with to the ED with hypoxia and tachycardia.  Noted worsening tachycardia after dialysis session.  Patient is ESRD patient on dialysis Tuesday/Thursday/Saturday.  Patient noted to be in atrial fibrillation/flutter with RVR in the emergency department.  Patient does have a history of this and required DCCV back in 2018.  Does not appear to be on any rate/rhythm controlling medications and not on any anticoagulations either.  Unclear etiology, may be hypervolemia in setting of ESRD.  May have been precipitated by upper respiratory infection as patient was complaining of headaches, cough, and yellow sputum production.  Also noticed an increase bilateral lower extremity edema. Cardiology was consulted give the afib and multiple medical comorbidities.       Subjective (Events in last 24 hours):   Pt was placed on IV amiodarone and required ALEISHA / CV yesterday to SR     Today he states he is feeling well - no chest pain or SOB - no palpitations or dizziness     - only symptoms were palpitations     Tele SR no ectopy   BP stable     Objective:   /65   Pulse 75   Temp 98.4 °F (36.9 °C) (Oral)   Resp 18   Ht 1.905 m (6' 3\")   Wt 95.4 kg (210 lb 5.1 oz)   SpO2 90%   BMI 26.29 kg/m²        TELEMETRY: SR no ectopy     Physical Exam:  General Appearance: alert and oriented to person, place and time, in no acute distress  Cardiovascular: normal rate, regular rhythm, normal S1 and S2, no murmurs, rubs, clicks, or gallops, distal pulses intact,   Pulmonary/Chest: clear to auscultation bilaterally- no wheezes, rales or rhonchi, normal air movement, no respiratory distress  Abdomen: soft, non-tender, non-distended,

## 2024-12-27 NOTE — PROGRESS NOTES
Pt very adamant to leave. This RN tried to explain discharge paper work such as new meds and follow up appts and pt stated \"this isnt my first rodeo, my ride is here I'm leaving.\" This RN did explain that pt was prescribed a blood thinner for his afib and that if he does not take this medication he is at a high risk for blood clots or a stroke. Pt did not let this RN explain upcoming appointments he was informed of appt at coumadin clinic  on Monday Dec 30 at 8am. This Rn did to tell him to  his three  new medications from our pharmacy downstairs today.

## 2024-12-27 NOTE — PROGRESS NOTES
[facility]   Nephrology progress note  12/27/2024, 10:51 AM      Pt Name:    Trav Jennings  MRN:     [id number]     YOB: 1965  Admit Date:    12/23/2024 12:02 PM    Chief Complaint: Nephrology following for ESRD on hemodialysis.    Subjective:  Patient seen and examined  He feels well denies any complaints, no dizziness, no  SOB. Wants to go home.    Objective:  24HR INTAKE/OUTPUT:    Intake/Output Summary (Last 24 hours) at 12/27/2024 1051  Last data filed at 12/26/2024 1206  Gross per 24 hour   Intake 300 ml   Output 2316 ml   Net -2016 ml        Admission weight: 103 kg (227 lb 1.2 oz)  Wt Readings from Last 3 Encounters:   12/26/24 95.4 kg (210 lb 5.1 oz)   12/07/24 96 kg (211 lb 10.3 oz)   09/30/24 94.3 kg (207 lb 14.3 oz)        Vitals :   Vitals:    12/26/24 2212 12/27/24 0245 12/27/24 0730 12/27/24 0953   BP: 114/63 128/68 (!) 162/71 122/65   Pulse: 75 67 75    Resp: 18 17 18    Temp:   98.4 °F (36.9 °C)    TempSrc:   Oral    SpO2: 95% 94% 90%    Weight:       Height:           Physical examination  General Appearance:  Well developed. No distress  Mouth/Throat:  Oral mucosa moist  Neck:  Supple, no JVD  Lungs:  Breath sounds: clear  Heart::  S1,S2 heard  Abdomen:  Soft, non - tender  Musculoskeletal:  Edema -none     Medications:  Infusion:    sodium chloride       Meds:    warfarin  7.5 mg Oral Once    amiodarone  200 mg Oral Daily    metoprolol succinate  25 mg Oral Daily    warfarin placeholder: dosing by pharmacy   Other RX Placeholder    cinacalcet  90 mg Oral Once per day on Tuesday Thursday Saturday    sodium chloride  250 mL IntraVENous Once    sodium chloride flush  5-40 mL IntraVENous 2 times per day    amLODIPine  10 mg Oral Daily    aspirin  81 mg Oral Daily    atorvastatin  40 mg Oral Nightly    calcitRIOL  0.5 mcg Oral Once per day on Monday Wednesday Friday    cloNIDine  0.3 mg Oral TID    doxazosin  4 mg Oral 2 times per day    hydrALAZINE  100 mg Oral q8h

## 2024-12-27 NOTE — CARE COORDINATION
12/27/24, 12:46 PM EST    Patient goals/plan/ treatment preferences discussed by  and .  Patient goals/plan/ treatment preferences reviewed with patient/ family.  Patient/ family verbalize understanding of discharge plan and are in agreement with goal/plan/treatment preferences.  Understanding was demonstrated using the teach back method.  AVS provided by RN at time of discharge, which includes all necessary medical information pertaining to the patients current course of illness, treatment, post-discharge goals of care, and treatment preferences.     Services At/After Discharge: Outpatient and In cab    Trav plans to return home w/ his wife. Resume OP HD at [facility]. Denies needs for DME or HH. Expressed need for cab home. Cab form on chart, updated primary RNHannah.

## 2024-12-27 NOTE — PROGRESS NOTES
Clinical Pharmacy Note                                               Warfarin Discharge Recommendations    Patient discharged from [facility] today on warfarin.    Warfarin indication: Atrial fibrillation  INR goal during admission: 2.0-3.0  Previous home warfarin regimen: new start  Interacting medications at discharge: amiodarone, aspirin, trazodone, sertraline  Coumadin 7.5 mg tabs    Hospital Warfarin Doses & INR Results    Date INR Warfarin Dose   12/26/24 1.05 7.5 mg    12/27/24 1.02  7.5 mg                                                Recent INRs:  Recent Labs     12/27/24  0642   INR 1.02     Warfarin Discharge Instructions:   Date Warfarin Dose   12/28 (tomorrow) 7.5 mg (1 tablet)   12/29 (Sunday) 7.5 mg (1 tablet)   12/30 8 am Follow up with the [facility]     Provider dosing warfarin: [facility]  Next INR date: 12/30/24 @8:00 am

## 2024-12-28 LAB
ECHO LV FRACTIONAL SHORTENING: 29 % (ref 28–44)
ECHO LV INTERNAL DIMENSION DIASTOLE INDEX: 1.7 CM/M2
ECHO LV INTERNAL DIMENSION DIASTOLIC: 3.8 CM (ref 4.2–5.9)
ECHO LV INTERNAL DIMENSION SYSTOLIC INDEX: 1.21 CM/M2
ECHO LV INTERNAL DIMENSION SYSTOLIC: 2.7 CM
ECHO LV IVSD: 2.1 CM (ref 0.6–1)
ECHO LV MASS 2D: 430.7 G (ref 88–224)
ECHO LV MASS INDEX 2D: 192.3 G/M2 (ref 49–115)
ECHO LV POSTERIOR WALL DIASTOLIC: 2.4 CM (ref 0.6–1)
ECHO LV RELATIVE WALL THICKNESS RATIO: 1.26
ECHO RV INTERNAL DIMENSION: 3 CM

## 2024-12-30 ENCOUNTER — TELEPHONE (OUTPATIENT)
Age: 59
End: 2024-12-30

## 2024-12-30 DIAGNOSIS — J96.11 CHRONIC HYPOXIC RESPIRATORY FAILURE: Primary | ICD-10-CM

## 2024-12-30 DIAGNOSIS — R94.2 ABNORMAL FINDING ON PULMONARY FUNCTION TESTING: ICD-10-CM

## 2024-12-30 DIAGNOSIS — G47.33 OSA (OBSTRUCTIVE SLEEP APNEA): ICD-10-CM

## 2024-12-30 NOTE — TELEPHONE ENCOUNTER
Received call from patient to re-schedule initial visit, was not able to make appointment this morning due to dialysis.  Pt reports he was not able to  new warfarin 7.5 mg prescription from at [facility] on day of hospital discharge 12/27, and pharmacy was closed 12/28 & 12/29. Trav will be picking up the prescription today.      Instructed pt to take Warfarin 7.5 mg today, 7.5 mg 12/31, 3.75 mg 1/1, 3.75 mg 1/2    Patient was able to read back instructions.      Next appointment 1/3/2025 at 10AM     Arlene McallisterD 12/30/2024 1:59 PM

## 2025-01-04 ENCOUNTER — APPOINTMENT (OUTPATIENT)
Dept: CT IMAGING | Age: 60
DRG: 896 | End: 2025-01-04
Payer: MEDICARE

## 2025-01-04 ENCOUNTER — APPOINTMENT (OUTPATIENT)
Dept: GENERAL RADIOLOGY | Age: 60
DRG: 896 | End: 2025-01-04
Payer: MEDICARE

## 2025-01-04 ENCOUNTER — HOSPITAL ENCOUNTER (INPATIENT)
Age: 60
LOS: 3 days | Discharge: HOME | DRG: 896 | End: 2025-01-07
Attending: EMERGENCY MEDICINE | Admitting: STUDENT IN AN ORGANIZED HEALTH CARE EDUCATION/TRAINING PROGRAM
Payer: MEDICARE

## 2025-01-04 DIAGNOSIS — E87.5 HYPERKALEMIA: Primary | ICD-10-CM

## 2025-01-04 DIAGNOSIS — F14.10 COCAINE ABUSE (HCC): ICD-10-CM

## 2025-01-04 DIAGNOSIS — R07.9 CHEST PAIN, UNSPECIFIED TYPE: ICD-10-CM

## 2025-01-04 DIAGNOSIS — I16.1 HYPERTENSIVE EMERGENCY: ICD-10-CM

## 2025-01-04 PROBLEM — I10 SEVERE HYPERTENSION: Status: ACTIVE | Noted: 2025-01-04

## 2025-01-04 LAB
ALBUMIN SERPL BCG-MCNC: 3.9 G/DL (ref 3.5–5.1)
ALP SERPL-CCNC: 100 U/L (ref 38–126)
ALT SERPL W/O P-5'-P-CCNC: 9 U/L (ref 11–66)
ANION GAP SERPL CALC-SCNC: 18 MEQ/L (ref 8–16)
AST SERPL-CCNC: 15 U/L (ref 5–40)
BASE EXCESS BLDA CALC-SCNC: 3.3 MMOL/L (ref -2–3)
BASOPHILS ABSOLUTE: 0 THOU/MM3 (ref 0–0.1)
BASOPHILS NFR BLD AUTO: 0.4 %
BILIRUB CONJ SERPL-MCNC: < 0.1 MG/DL (ref 0.1–13.8)
BILIRUB SERPL-MCNC: 0.3 MG/DL (ref 0.3–1.2)
BUN SERPL-MCNC: 63 MG/DL (ref 7–22)
CA-I BLD ISE-SCNC: 1.04 MMOL/L (ref 1.12–1.32)
CALCIUM SERPL-MCNC: 9.1 MG/DL (ref 8.5–10.5)
CHLORIDE BLD-SCNC: 105 MEQ/L (ref 98–109)
CHLORIDE SERPL-SCNC: 97 MEQ/L (ref 98–111)
CO2 SERPL-SCNC: 22 MEQ/L (ref 23–33)
COLLECTED BY:: ABNORMAL
CREAT SERPL-MCNC: 9 MG/DL (ref 0.4–1.2)
DEPRECATED RDW RBC AUTO: 54.2 FL (ref 35–45)
DEVICE: ABNORMAL
EKG ATRIAL RATE: 84 BPM
EKG ATRIAL RATE: 87 BPM
EKG ATRIAL RATE: 95 BPM
EKG P AXIS: 63 DEGREES
EKG P-R INTERVAL: 208 MS
EKG P-R INTERVAL: 216 MS
EKG P-R INTERVAL: 234 MS
EKG Q-T INTERVAL: 402 MS
EKG Q-T INTERVAL: 420 MS
EKG Q-T INTERVAL: 424 MS
EKG QRS DURATION: 112 MS
EKG QRS DURATION: 114 MS
EKG QRS DURATION: 114 MS
EKG QTC CALCULATION (BAZETT): 501 MS
EKG QTC CALCULATION (BAZETT): 505 MS
EKG QTC CALCULATION (BAZETT): 505 MS
EKG R AXIS: -44 DEGREES
EKG R AXIS: -47 DEGREES
EKG R AXIS: -52 DEGREES
EKG T AXIS: 84 DEGREES
EKG T AXIS: 84 DEGREES
EKG T AXIS: 89 DEGREES
EKG VENTRICULAR RATE: 84 BPM
EKG VENTRICULAR RATE: 87 BPM
EKG VENTRICULAR RATE: 95 BPM
EOSINOPHIL NFR BLD AUTO: 0.8 %
EOSINOPHILS ABSOLUTE: 0.1 THOU/MM3 (ref 0–0.4)
ERYTHROCYTE [DISTWIDTH] IN BLOOD BY AUTOMATED COUNT: 14.4 % (ref 11.5–14.5)
ETHANOL SERPL-MCNC: < 0.01 % (ref 0–0.08)
GFR SERPL CREATININE-BSD FRML MDRD: 6 ML/MIN/1.73M2
GLUCOSE BLD-MCNC: 101 MG/DL (ref 70–108)
GLUCOSE SERPL-MCNC: 94 MG/DL (ref 70–108)
HBV SURFACE AG SERPL QL IA: NEGATIVE
HCO3 BLDA-SCNC: 30 MMOL/L (ref 23–28)
HCT VFR BLD AUTO: 35.7 % (ref 42–52)
HGB BLD-MCNC: 11.1 GM/DL (ref 14–18)
IMM GRANULOCYTES # BLD AUTO: 0.04 THOU/MM3 (ref 0–0.07)
IMM GRANULOCYTES NFR BLD AUTO: 0.4 %
INR PPP: 1.74 (ref 0.85–1.13)
LACTATE BLD-SCNC: 1 MMOL/L (ref 0.5–1.9)
LYMPHOCYTES ABSOLUTE: 0.7 THOU/MM3 (ref 1–4.8)
LYMPHOCYTES NFR BLD AUTO: 7.1 %
MCH RBC QN AUTO: 32.1 PG (ref 26–33)
MCHC RBC AUTO-ENTMCNC: 31.1 GM/DL (ref 32.2–35.5)
MCV RBC AUTO: 103.2 FL (ref 80–94)
MONOCYTES ABSOLUTE: 0.6 THOU/MM3 (ref 0.4–1.3)
MONOCYTES NFR BLD AUTO: 6.6 %
NEUTROPHILS ABSOLUTE: 8.3 THOU/MM3 (ref 1.8–7.7)
NEUTROPHILS NFR BLD AUTO: 84.7 %
NRBC BLD AUTO-RTO: 0 /100 WBC
OSMOLALITY SERPL CALC.SUM OF ELEC: 291.5 MOSMOL/KG (ref 275–300)
PCO2 TEMP ADJ BLDMV: 52 MMHG (ref 41–51)
PH BLDMV: 7.36 [PH] (ref 7.31–7.41)
PLATELET # BLD AUTO: 131 THOU/MM3 (ref 130–400)
PMV BLD AUTO: 10.2 FL (ref 9.4–12.4)
PO2 BLDMV: 41 MMHG (ref 25–40)
POC CREATININE WHOLE BLOOD: 9.4 MG/DL (ref 0.5–1.2)
POTASSIUM BLD-SCNC: 8.3 MEQ/L (ref 3.5–4.9)
POTASSIUM SERPL-SCNC: 5.7 MEQ/L (ref 3.5–5.2)
PROT SERPL-MCNC: 7.4 G/DL (ref 6.1–8)
RBC # BLD AUTO: 3.46 MILL/MM3 (ref 4.7–6.1)
SAO2 % BLDMV: 73 %
SITE: ABNORMAL
SODIUM BLD-SCNC: 132 MEQ/L (ref 138–146)
SODIUM SERPL-SCNC: 137 MEQ/L (ref 135–145)
TROPONIN, HIGH SENSITIVITY: 101 NG/L (ref 0–12)
TROPONIN, HIGH SENSITIVITY: 88 NG/L (ref 0–12)
VENTILATION MODE VENT: ABNORMAL
WBC # BLD AUTO: 9.8 THOU/MM3 (ref 4.8–10.8)

## 2025-01-04 PROCEDURE — 6370000000 HC RX 637 (ALT 250 FOR IP)

## 2025-01-04 PROCEDURE — 6360000002 HC RX W HCPCS

## 2025-01-04 PROCEDURE — 2580000003 HC RX 258

## 2025-01-04 PROCEDURE — 82077 ASSAY SPEC XCP UR&BREATH IA: CPT

## 2025-01-04 PROCEDURE — 5A1D70Z PERFORMANCE OF URINARY FILTRATION, INTERMITTENT, LESS THAN 6 HOURS PER DAY: ICD-10-PCS | Performed by: INTERNAL MEDICINE

## 2025-01-04 PROCEDURE — 83605 ASSAY OF LACTIC ACID: CPT

## 2025-01-04 PROCEDURE — 6360000004 HC RX CONTRAST MEDICATION: Performed by: EMERGENCY MEDICINE

## 2025-01-04 PROCEDURE — 82330 ASSAY OF CALCIUM: CPT

## 2025-01-04 PROCEDURE — 2500000003 HC RX 250 WO HCPCS: Performed by: EMERGENCY MEDICINE

## 2025-01-04 PROCEDURE — 82435 ASSAY OF BLOOD CHLORIDE: CPT

## 2025-01-04 PROCEDURE — 96365 THER/PROPH/DIAG IV INF INIT: CPT

## 2025-01-04 PROCEDURE — 84132 ASSAY OF SERUM POTASSIUM: CPT

## 2025-01-04 PROCEDURE — 99223 1ST HOSP IP/OBS HIGH 75: CPT | Performed by: STUDENT IN AN ORGANIZED HEALTH CARE EDUCATION/TRAINING PROGRAM

## 2025-01-04 PROCEDURE — 80053 COMPREHEN METABOLIC PANEL: CPT

## 2025-01-04 PROCEDURE — 82248 BILIRUBIN DIRECT: CPT

## 2025-01-04 PROCEDURE — 71275 CT ANGIOGRAPHY CHEST: CPT

## 2025-01-04 PROCEDURE — 93005 ELECTROCARDIOGRAM TRACING: CPT

## 2025-01-04 PROCEDURE — 6360000002 HC RX W HCPCS: Performed by: EMERGENCY MEDICINE

## 2025-01-04 PROCEDURE — 82565 ASSAY OF CREATININE: CPT

## 2025-01-04 PROCEDURE — 99222 1ST HOSP IP/OBS MODERATE 55: CPT | Performed by: INTERNAL MEDICINE

## 2025-01-04 PROCEDURE — 2500000003 HC RX 250 WO HCPCS

## 2025-01-04 PROCEDURE — 2060000000 HC ICU INTERMEDIATE R&B

## 2025-01-04 PROCEDURE — 96375 TX/PRO/DX INJ NEW DRUG ADDON: CPT

## 2025-01-04 PROCEDURE — 71045 X-RAY EXAM CHEST 1 VIEW: CPT

## 2025-01-04 PROCEDURE — 90935 HEMODIALYSIS ONE EVALUATION: CPT | Performed by: INTERNAL MEDICINE

## 2025-01-04 PROCEDURE — 85025 COMPLETE CBC W/AUTO DIFF WBC: CPT

## 2025-01-04 PROCEDURE — 93010 ELECTROCARDIOGRAM REPORT: CPT | Performed by: INTERNAL MEDICINE

## 2025-01-04 PROCEDURE — 82803 BLOOD GASES ANY COMBINATION: CPT

## 2025-01-04 PROCEDURE — 94761 N-INVAS EAR/PLS OXIMETRY MLT: CPT

## 2025-01-04 PROCEDURE — 84295 ASSAY OF SERUM SODIUM: CPT

## 2025-01-04 PROCEDURE — 84484 ASSAY OF TROPONIN QUANT: CPT

## 2025-01-04 PROCEDURE — 93005 ELECTROCARDIOGRAM TRACING: CPT | Performed by: EMERGENCY MEDICINE

## 2025-01-04 PROCEDURE — 99285 EMERGENCY DEPT VISIT HI MDM: CPT

## 2025-01-04 PROCEDURE — 96366 THER/PROPH/DIAG IV INF ADDON: CPT

## 2025-01-04 PROCEDURE — 94640 AIRWAY INHALATION TREATMENT: CPT

## 2025-01-04 PROCEDURE — 85610 PROTHROMBIN TIME: CPT

## 2025-01-04 PROCEDURE — 87340 HEPATITIS B SURFACE AG IA: CPT

## 2025-01-04 PROCEDURE — 96374 THER/PROPH/DIAG INJ IV PUSH: CPT

## 2025-01-04 PROCEDURE — 82947 ASSAY GLUCOSE BLOOD QUANT: CPT

## 2025-01-04 PROCEDURE — 36415 COLL VENOUS BLD VENIPUNCTURE: CPT

## 2025-01-04 PROCEDURE — 87641 MR-STAPH DNA AMP PROBE: CPT

## 2025-01-04 PROCEDURE — 2700000000 HC OXYGEN THERAPY PER DAY

## 2025-01-04 RX ORDER — SODIUM CHLORIDE 9 MG/ML
INJECTION, SOLUTION INTRAVENOUS PRN
Status: DISCONTINUED | OUTPATIENT
Start: 2025-01-04 | End: 2025-01-07 | Stop reason: HOSPADM

## 2025-01-04 RX ORDER — LORAZEPAM 2 MG/ML
3 INJECTION INTRAMUSCULAR
Status: DISCONTINUED | OUTPATIENT
Start: 2025-01-04 | End: 2025-01-07

## 2025-01-04 RX ORDER — LORAZEPAM 1 MG/1
1 TABLET ORAL
Status: DISCONTINUED | OUTPATIENT
Start: 2025-01-04 | End: 2025-01-07

## 2025-01-04 RX ORDER — GLUCAGON 1 MG/ML
1 KIT INJECTION PRN
Status: DISCONTINUED | OUTPATIENT
Start: 2025-01-04 | End: 2025-01-07 | Stop reason: HOSPADM

## 2025-01-04 RX ORDER — PHENOBARBITAL 32.4 MG/1
64.8 TABLET ORAL 2 TIMES DAILY
Status: DISCONTINUED | OUTPATIENT
Start: 2025-01-05 | End: 2025-01-04

## 2025-01-04 RX ORDER — LANOLIN ALCOHOL/MO/W.PET/CERES
100 CREAM (GRAM) TOPICAL DAILY
Status: DISCONTINUED | OUTPATIENT
Start: 2025-01-04 | End: 2025-01-05

## 2025-01-04 RX ORDER — LORAZEPAM 1 MG/1
3 TABLET ORAL
Status: DISCONTINUED | OUTPATIENT
Start: 2025-01-04 | End: 2025-01-07

## 2025-01-04 RX ORDER — PHENOBARBITAL 32.4 MG/1
32.4 TABLET ORAL 2 TIMES DAILY
Status: DISCONTINUED | OUTPATIENT
Start: 2025-01-06 | End: 2025-01-04

## 2025-01-04 RX ORDER — LORAZEPAM 1 MG/1
4 TABLET ORAL
Status: DISCONTINUED | OUTPATIENT
Start: 2025-01-04 | End: 2025-01-07

## 2025-01-04 RX ORDER — LORAZEPAM 2 MG/ML
1 INJECTION INTRAMUSCULAR
Status: DISCONTINUED | OUTPATIENT
Start: 2025-01-04 | End: 2025-01-07

## 2025-01-04 RX ORDER — SODIUM CHLORIDE 0.9 % (FLUSH) 0.9 %
5-40 SYRINGE (ML) INJECTION EVERY 12 HOURS SCHEDULED
Status: DISCONTINUED | OUTPATIENT
Start: 2025-01-04 | End: 2025-01-07 | Stop reason: HOSPADM

## 2025-01-04 RX ORDER — SODIUM CHLORIDE 0.9 % (FLUSH) 0.9 %
5-40 SYRINGE (ML) INJECTION PRN
Status: DISCONTINUED | OUTPATIENT
Start: 2025-01-04 | End: 2025-01-07 | Stop reason: HOSPADM

## 2025-01-04 RX ORDER — LORAZEPAM 2 MG/ML
2 INJECTION INTRAMUSCULAR
Status: DISCONTINUED | OUTPATIENT
Start: 2025-01-04 | End: 2025-01-07

## 2025-01-04 RX ORDER — ALBUTEROL SULFATE 0.83 MG/ML
5 SOLUTION RESPIRATORY (INHALATION) ONCE
Status: DISCONTINUED | OUTPATIENT
Start: 2025-01-04 | End: 2025-01-07 | Stop reason: HOSPADM

## 2025-01-04 RX ORDER — LORAZEPAM 2 MG/ML
4 INJECTION INTRAMUSCULAR
Status: DISCONTINUED | OUTPATIENT
Start: 2025-01-04 | End: 2025-01-07

## 2025-01-04 RX ORDER — LORAZEPAM 2 MG/ML
1 INJECTION INTRAMUSCULAR ONCE
Status: COMPLETED | OUTPATIENT
Start: 2025-01-04 | End: 2025-01-04

## 2025-01-04 RX ORDER — IOPAMIDOL 755 MG/ML
80 INJECTION, SOLUTION INTRAVASCULAR
Status: COMPLETED | OUTPATIENT
Start: 2025-01-04 | End: 2025-01-04

## 2025-01-04 RX ORDER — ALBUTEROL SULFATE 0.83 MG/ML
5 SOLUTION RESPIRATORY (INHALATION)
Status: COMPLETED | OUTPATIENT
Start: 2025-01-04 | End: 2025-01-04

## 2025-01-04 RX ORDER — CALCIUM GLUCONATE 20 MG/ML
1000 INJECTION, SOLUTION INTRAVENOUS ONCE
Status: COMPLETED | OUTPATIENT
Start: 2025-01-04 | End: 2025-01-04

## 2025-01-04 RX ORDER — CHLORDIAZEPOXIDE HYDROCHLORIDE 25 MG/1
25 CAPSULE, GELATIN COATED ORAL 3 TIMES DAILY
Status: DISPENSED | OUTPATIENT
Start: 2025-01-04 | End: 2025-01-06

## 2025-01-04 RX ORDER — LORAZEPAM 1 MG/1
2 TABLET ORAL
Status: DISCONTINUED | OUTPATIENT
Start: 2025-01-04 | End: 2025-01-07

## 2025-01-04 RX ORDER — DEXTROSE MONOHYDRATE 100 MG/ML
INJECTION, SOLUTION INTRAVENOUS CONTINUOUS PRN
Status: DISCONTINUED | OUTPATIENT
Start: 2025-01-04 | End: 2025-01-07 | Stop reason: HOSPADM

## 2025-01-04 RX ORDER — NITROGLYCERIN 20 MG/100ML
5-200 INJECTION INTRAVENOUS CONTINUOUS
Status: DISCONTINUED | OUTPATIENT
Start: 2025-01-04 | End: 2025-01-04

## 2025-01-04 RX ORDER — CALCIUM GLUCONATE 94 MG/ML
1000 INJECTION, SOLUTION INTRAVENOUS ONCE
Status: COMPLETED | OUTPATIENT
Start: 2025-01-04 | End: 2025-01-04

## 2025-01-04 RX ORDER — LABETALOL 100 MG/1
50 TABLET, FILM COATED ORAL EVERY 12 HOURS SCHEDULED
Status: DISCONTINUED | OUTPATIENT
Start: 2025-01-04 | End: 2025-01-05

## 2025-01-04 RX ADMIN — CALCIUM GLUCONATE 1000 MG: 98 INJECTION, SOLUTION INTRAVENOUS at 12:12

## 2025-01-04 RX ADMIN — NITROGLYCERIN 5 MCG/MIN: 20 INJECTION INTRAVENOUS at 12:22

## 2025-01-04 RX ADMIN — SODIUM CHLORIDE 11 MG/HR: 9 INJECTION, SOLUTION INTRAVENOUS at 22:00

## 2025-01-04 RX ADMIN — SODIUM CHLORIDE 5 MG/HR: 9 INJECTION, SOLUTION INTRAVENOUS at 16:02

## 2025-01-04 RX ADMIN — LABETALOL HYDROCHLORIDE 50 MG: 100 TABLET, FILM COATED ORAL at 22:06

## 2025-01-04 RX ADMIN — SODIUM BICARBONATE 50 MEQ: 84 INJECTION, SOLUTION INTRAVENOUS at 12:39

## 2025-01-04 RX ADMIN — SODIUM CHLORIDE, PRESERVATIVE FREE 10 ML: 5 INJECTION INTRAVENOUS at 22:02

## 2025-01-04 RX ADMIN — SODIUM CHLORIDE 11 MG/HR: 9 INJECTION, SOLUTION INTRAVENOUS at 18:36

## 2025-01-04 RX ADMIN — CHLORDIAZEPOXIDE HYDROCHLORIDE 25 MG: 25 CAPSULE ORAL at 22:07

## 2025-01-04 RX ADMIN — CALCIUM GLUCONATE 1000 MG: 20 INJECTION, SOLUTION INTRAVENOUS at 12:41

## 2025-01-04 RX ADMIN — IOPAMIDOL 80 ML: 755 INJECTION, SOLUTION INTRAVENOUS at 13:23

## 2025-01-04 RX ADMIN — Medication 100 MG: at 22:06

## 2025-01-04 RX ADMIN — SODIUM CHLORIDE 7.5 MG/HR: 9 INJECTION, SOLUTION INTRAVENOUS at 16:23

## 2025-01-04 RX ADMIN — SODIUM CHLORIDE 10 MG/HR: 9 INJECTION, SOLUTION INTRAVENOUS at 17:04

## 2025-01-04 RX ADMIN — LORAZEPAM 1 MG: 2 INJECTION INTRAMUSCULAR; INTRAVENOUS at 12:10

## 2025-01-04 RX ADMIN — ALBUTEROL SULFATE 5 MG: 2.5 SOLUTION RESPIRATORY (INHALATION) at 12:22

## 2025-01-04 ASSESSMENT — PAIN SCALES - GENERAL
PAINLEVEL_OUTOF10: 6
PAINLEVEL_OUTOF10: 5
PAINLEVEL_OUTOF10: 5
PAINLEVEL_OUTOF10: 0
PAINLEVEL_OUTOF10: 5
PAINLEVEL_OUTOF10: 5

## 2025-01-04 ASSESSMENT — PAIN - FUNCTIONAL ASSESSMENT
PAIN_FUNCTIONAL_ASSESSMENT: 0-10

## 2025-01-04 ASSESSMENT — PAIN DESCRIPTION - LOCATION
LOCATION: CHEST

## 2025-01-04 NOTE — ED NOTES
ED to inpatient nurses report      Chief Complaint:  Chief Complaint   Patient presents with    Alcohol Problem    Drug Problem     Present to ED from: HOME    MOA:     LOC: alert and orientated to name, place, date  Mobility: Requires assistance * 1  Oxygen Baseline: 2L NC    Current needs required: 2L NC     Code Status:   Prior    What abnormal results were found and what did you give/do to treat them? HYPERKALEMIA, CHEST PAIN, HYPERTENSIVE EMERGENCY, COCAINE ABUSE   Any procedures or intervention occur? SEE MAR    Mental Status:  Level of Consciousness: Alert (0)    Psych Assessment:        Vitals:  Patient Vitals for the past 24 hrs:   BP Temp Temp src Pulse Resp SpO2 Height Weight   01/04/25 1426 (!) 187/94 -- -- 84 20 94 % -- --   01/04/25 1424 (!) 187/94 -- -- 84 -- -- -- --   01/04/25 1402 (!) 191/92 -- -- 85 20 95 % -- --   01/04/25 1320 (!) 199/90 -- -- 87 20 97 % -- --   01/04/25 1254 (!) 182/84 -- -- 87 20 97 % -- --   01/04/25 1225 -- -- -- 86 17 100 % -- --   01/04/25 1217 (!) 193/85 -- -- 87 19 95 % -- --   01/04/25 1202 -- -- -- 89 21 -- -- --   01/04/25 1136 (!) 221/113 -- -- -- -- -- -- --   01/04/25 1133 -- 98.9 °F (37.2 °C) Oral -- -- -- -- --   01/04/25 1131 -- -- -- 97 22 95 % 1.905 m (6' 3\") 94 kg (207 lb 3.7 oz)        LDAs:   Peripheral IV 01/04/25 Right Forearm (Active)   Site Assessment Clean, dry & intact 01/04/25 1402   Phlebitis Assessment No symptoms 01/04/25 1402   Infiltration Assessment 0 01/04/25 1402       Peripheral IV 01/04/25 Proximal;Right Forearm (Active)   Site Assessment Clean, dry & intact 01/04/25 1402   Phlebitis Assessment No symptoms 01/04/25 1402   Infiltration Assessment 0 01/04/25 1402       Ambulatory Status:  No data recorded    Diagnosis:  DISPOSITION Decision To Admit 01/04/2025 02:45:56 PM   Final diagnoses:   Hyperkalemia   Chest pain, unspecified type   Hypertensive emergency   Cocaine abuse (HCC)        Consults:  None     Pain Score:  Pain Assessment  Pain

## 2025-01-04 NOTE — H&P
History & Physical  Internal Medicine Resident         Patient: Trav Jennings 59 y.o. male      : 1965  Date of Admission: 2025  Date of Service: Pt seen/examined on 25 and Admitted to Inpatient with expected LOS greater than two midnights due to medical therapy.       ASSESSMENT AND PLAN  Hypertensive urgency 2/2 cocaine toxicity: Complicated by resistant hypertension and ESRD.  Patient endorses using approximately 2 to 3 g cocaine per day, developed chest pain which resolved after treatment described in HPI.  Presented with , after several hours on Cardizem drip SBP dropped to 180s to 190s.  Home meds include: Amlodipine, Toprol, clonidine, Imdur, hydralazine and doxazosin.  Continue Cardizem drip titrated for SBP less than 160 mmHg  Also starting labetalol 50 mg twice daily on  evening, per CTS recommendation in order to achieve some beta-blockade in setting of aortic dissection as described in below section  Holding home meds in setting of tight BP control with Cardizem drip  Thoracic aortic aneurysm with type B aortic dissection: Patient has prior history thoracic aortic aneurysm s/p stent graft placement extending from proximal transverse aortic arch to distal thoracic aorta.  CTA imaging showing eccentric mural thrombus along distal thoracic aorta with descending aortic dissection flap seen at hiatus.  Cardiothoracic surgery consulted and following  Avoiding esmolol infusion in setting of cocaine toxicity  Will start labetalol 50 mg twice daily on  evening  Consider esmolol infusion for HR less than 60 on   ESRD on HD: Patient on 3 times weekly dialysis, has AV fistula.  Reportedly has been noncompliant with dialysis in the past. Nephrology consulted for HD  Hyperkalemia: Point-of-care K was reportedly 8.3 upon admission, treated with calcium gluconate, albuterol.  BMP reported K 5.7.  Nephrology consulted and following, appreciate input.  Paroxysmal A-fib: Previously

## 2025-01-04 NOTE — ED PROVIDER NOTES
DO       Heart Score 7  Medical Decision Making  ED bed 20 -59-year-old male dialysis patient history of thoracic dissection.  He reports he has been struggling with drug and alcohol addiction for some time now.  He smoked crack cocaine shortly before arrival.  Arrived hypertensive with chest pain in the upper chest going to his back.  Blood pressure was in the 220s heart rate was normal in the 80s.  EKG with LVH strain pattern in addition to peaked T waves.  Chest pain improved with nitro drip and Ativan.  Point-of-care potassium was obtained found to be 8.3 so patient was then given 2 g of calcium gluconate, an amp of bicarb , and 5 mg of albuterol.  Given Ativan and NTG for cocaine related pain. Blood pressure improved to 187/94 pulse rate 84 chest pain is resolved. hgb 11.1.  Repeat electrolyte on BMP with potassium of 5.7. BUN 63 Cr. 9.0   Bicarb is 22.  His pH is 7.36. CTA chest appears to have a stable dissection repair with no endoleak reviewed with Dr. Lee.  I consulted the hospitalist for admission for continued blood pressure control and chest pain. Consulted Dr. Brown for dialysis and hyperkalemia.             MEDICATION CHANGES     DISCHARGE MEDICATIONS:  New Prescriptions    No medications on file            FINAL DISPOSITION     Final diagnoses:   Hyperkalemia   Chest pain, unspecified type   Hypertensive emergency   Cocaine abuse (HCC)     DISPOSITION Admitted 01/04/2025 03:10:31 PM  Condition at Disposition: Stable    PATIENT REFERRED TO:  No follow-up provider specified.    Critical Care Time   CRITICAL CARE:  CRITICAL CARE: There was a high probability of clinically significant/life threatening deterioration in this patient's condition which required my urgent intervention.  Total critical care time was 35 minutes.  This excludes any time for separately reportable procedures.      PROCEDURES: (None if blank)  Procedures:       This transcription was electronically signed. Parts of this

## 2025-01-04 NOTE — ED TRIAGE NOTES
Presents to ED with c/o cocaine and alcohol problem. Patient last use of cocaine and alcohol was an hour ago. Patient tearful during triage. Patient is on home 2L NC. Alert and oriented. Respirations easy and unlabored. Denies suicidal or homicidal ideation.

## 2025-01-04 NOTE — ED NOTES
Patient transported to [facility] in stable condition. Spoke to [facility] prior to transport.    Scribe Attestation (For Scribes USE Only)... Attending Attestation (For Attendings USE Only).../Scribe Attestation (For Scribes USE Only)...

## 2025-01-05 LAB
ANION GAP SERPL CALC-SCNC: 13 MEQ/L (ref 8–16)
APTT PPP: 32.6 SECONDS (ref 22–38)
BUN SERPL-MCNC: 45 MG/DL (ref 7–22)
CA-I BLD ISE-SCNC: 1.06 MMOL/L (ref 1.12–1.32)
CALCIUM SERPL-MCNC: 8.2 MG/DL (ref 8.5–10.5)
CHLORIDE SERPL-SCNC: 103 MEQ/L (ref 98–111)
CO2 SERPL-SCNC: 26 MEQ/L (ref 23–33)
CREAT SERPL-MCNC: 8 MG/DL (ref 0.4–1.2)
DEPRECATED RDW RBC AUTO: 54.8 FL (ref 35–45)
ERYTHROCYTE [DISTWIDTH] IN BLOOD BY AUTOMATED COUNT: 14.2 % (ref 11.5–14.5)
GFR SERPL CREATININE-BSD FRML MDRD: 7 ML/MIN/1.73M2
GLUCOSE BLD STRIP.AUTO-MCNC: 65 MG/DL (ref 70–108)
GLUCOSE BLD STRIP.AUTO-MCNC: 71 MG/DL (ref 70–108)
GLUCOSE BLD STRIP.AUTO-MCNC: 75 MG/DL (ref 70–108)
GLUCOSE SERPL-MCNC: 82 MG/DL (ref 70–108)
HCT VFR BLD AUTO: 35.9 % (ref 42–52)
HGB BLD-MCNC: 11.1 GM/DL (ref 14–18)
INR PPP: 1.21 (ref 0.85–1.13)
MAGNESIUM SERPL-MCNC: 2.3 MG/DL (ref 1.6–2.4)
MCH RBC QN AUTO: 32.4 PG (ref 26–33)
MCHC RBC AUTO-ENTMCNC: 30.9 GM/DL (ref 32.2–35.5)
MCV RBC AUTO: 104.7 FL (ref 80–94)
MRSA DNA SPEC QL NAA+PROBE: NEGATIVE
OSMOLALITY SERPL CALC.SUM OF ELEC: 293.7 MOSMOL/KG (ref 275–300)
PLATELET # BLD AUTO: 119 THOU/MM3 (ref 130–400)
PMV BLD AUTO: 9.4 FL (ref 9.4–12.4)
POTASSIUM SERPL-SCNC: 5.4 MEQ/L (ref 3.5–5.2)
RBC # BLD AUTO: 3.43 MILL/MM3 (ref 4.7–6.1)
SODIUM SERPL-SCNC: 142 MEQ/L (ref 135–145)
TROPONIN, HIGH SENSITIVITY: 103 NG/L (ref 0–12)
WBC # BLD AUTO: 7.6 THOU/MM3 (ref 4.8–10.8)

## 2025-01-05 PROCEDURE — 2580000003 HC RX 258

## 2025-01-05 PROCEDURE — 6370000000 HC RX 637 (ALT 250 FOR IP)

## 2025-01-05 PROCEDURE — 85730 THROMBOPLASTIN TIME PARTIAL: CPT

## 2025-01-05 PROCEDURE — 99233 SBSQ HOSP IP/OBS HIGH 50: CPT | Performed by: STUDENT IN AN ORGANIZED HEALTH CARE EDUCATION/TRAINING PROGRAM

## 2025-01-05 PROCEDURE — 6360000002 HC RX W HCPCS

## 2025-01-05 PROCEDURE — 82330 ASSAY OF CALCIUM: CPT

## 2025-01-05 PROCEDURE — 85027 COMPLETE CBC AUTOMATED: CPT

## 2025-01-05 PROCEDURE — 80048 BASIC METABOLIC PNL TOTAL CA: CPT

## 2025-01-05 PROCEDURE — 83735 ASSAY OF MAGNESIUM: CPT

## 2025-01-05 PROCEDURE — 99232 SBSQ HOSP IP/OBS MODERATE 35: CPT | Performed by: INTERNAL MEDICINE

## 2025-01-05 PROCEDURE — 2060000000 HC ICU INTERMEDIATE R&B

## 2025-01-05 PROCEDURE — 84484 ASSAY OF TROPONIN QUANT: CPT

## 2025-01-05 PROCEDURE — 85610 PROTHROMBIN TIME: CPT

## 2025-01-05 PROCEDURE — 6370000000 HC RX 637 (ALT 250 FOR IP): Performed by: STUDENT IN AN ORGANIZED HEALTH CARE EDUCATION/TRAINING PROGRAM

## 2025-01-05 PROCEDURE — 2500000003 HC RX 250 WO HCPCS

## 2025-01-05 PROCEDURE — 82948 REAGENT STRIP/BLOOD GLUCOSE: CPT

## 2025-01-05 PROCEDURE — 6370000000 HC RX 637 (ALT 250 FOR IP): Performed by: INTERNAL MEDICINE

## 2025-01-05 PROCEDURE — 36415 COLL VENOUS BLD VENIPUNCTURE: CPT

## 2025-01-05 RX ORDER — METOPROLOL SUCCINATE 25 MG/1
25 TABLET, EXTENDED RELEASE ORAL DAILY
Status: DISCONTINUED | OUTPATIENT
Start: 2025-01-05 | End: 2025-01-05

## 2025-01-05 RX ORDER — AMIODARONE HYDROCHLORIDE 200 MG/1
200 TABLET ORAL DAILY
Status: DISCONTINUED | OUTPATIENT
Start: 2025-01-05 | End: 2025-01-07 | Stop reason: HOSPADM

## 2025-01-05 RX ORDER — DOXAZOSIN 4 MG/1
4 TABLET ORAL EVERY 12 HOURS SCHEDULED
Status: DISCONTINUED | OUTPATIENT
Start: 2025-01-05 | End: 2025-01-07

## 2025-01-05 RX ORDER — GUAIFENESIN 600 MG/1
600 TABLET, EXTENDED RELEASE ORAL 2 TIMES DAILY
Status: DISCONTINUED | OUTPATIENT
Start: 2025-01-05 | End: 2025-01-07 | Stop reason: HOSPADM

## 2025-01-05 RX ORDER — AMLODIPINE BESYLATE 10 MG/1
10 TABLET ORAL DAILY
Status: DISCONTINUED | OUTPATIENT
Start: 2025-01-05 | End: 2025-01-07

## 2025-01-05 RX ORDER — CALCITRIOL 0.25 UG/1
0.5 CAPSULE, LIQUID FILLED ORAL
Status: DISCONTINUED | OUTPATIENT
Start: 2025-01-06 | End: 2025-01-07 | Stop reason: HOSPADM

## 2025-01-05 RX ORDER — LANOLIN ALCOHOL/MO/W.PET/CERES
100 CREAM (GRAM) TOPICAL DAILY
Status: DISCONTINUED | OUTPATIENT
Start: 2025-01-05 | End: 2025-01-07 | Stop reason: HOSPADM

## 2025-01-05 RX ORDER — ASPIRIN 81 MG/1
81 TABLET ORAL DAILY
Status: DISCONTINUED | OUTPATIENT
Start: 2025-01-05 | End: 2025-01-07 | Stop reason: HOSPADM

## 2025-01-05 RX ORDER — CALCIUM GLUCONATE 20 MG/ML
2000 INJECTION, SOLUTION INTRAVENOUS ONCE
Status: COMPLETED | OUTPATIENT
Start: 2025-01-05 | End: 2025-01-05

## 2025-01-05 RX ORDER — ACETAMINOPHEN 325 MG/1
650 TABLET ORAL EVERY 4 HOURS PRN
Status: DISCONTINUED | OUTPATIENT
Start: 2025-01-05 | End: 2025-01-07 | Stop reason: HOSPADM

## 2025-01-05 RX ORDER — HYDRALAZINE HYDROCHLORIDE 50 MG/1
100 TABLET, FILM COATED ORAL EVERY 8 HOURS
Status: DISCONTINUED | OUTPATIENT
Start: 2025-01-05 | End: 2025-01-07 | Stop reason: HOSPADM

## 2025-01-05 RX ORDER — LABETALOL 100 MG/1
100 TABLET, FILM COATED ORAL EVERY 8 HOURS SCHEDULED
Status: DISCONTINUED | OUTPATIENT
Start: 2025-01-05 | End: 2025-01-05

## 2025-01-05 RX ORDER — WARFARIN SODIUM 7.5 MG/1
7.5 TABLET ORAL
Status: COMPLETED | OUTPATIENT
Start: 2025-01-05 | End: 2025-01-05

## 2025-01-05 RX ORDER — LABETALOL 100 MG/1
100 TABLET, FILM COATED ORAL EVERY 12 HOURS SCHEDULED
Status: DISCONTINUED | OUTPATIENT
Start: 2025-01-05 | End: 2025-01-06

## 2025-01-05 RX ORDER — ATORVASTATIN CALCIUM 40 MG/1
40 TABLET, FILM COATED ORAL DAILY
Status: DISCONTINUED | OUTPATIENT
Start: 2025-01-05 | End: 2025-01-07 | Stop reason: HOSPADM

## 2025-01-05 RX ORDER — TRAZODONE HYDROCHLORIDE 100 MG/1
100 TABLET ORAL NIGHTLY PRN
Status: DISCONTINUED | OUTPATIENT
Start: 2025-01-05 | End: 2025-01-07 | Stop reason: HOSPADM

## 2025-01-05 RX ORDER — ISOSORBIDE MONONITRATE 60 MG/1
120 TABLET, EXTENDED RELEASE ORAL 2 TIMES DAILY
Status: DISCONTINUED | OUTPATIENT
Start: 2025-01-05 | End: 2025-01-07 | Stop reason: HOSPADM

## 2025-01-05 RX ORDER — BENZONATATE 100 MG/1
100 CAPSULE ORAL 3 TIMES DAILY PRN
Status: DISCONTINUED | OUTPATIENT
Start: 2025-01-05 | End: 2025-01-07 | Stop reason: HOSPADM

## 2025-01-05 RX ORDER — CINACALCET 30 MG/1
90 TABLET, FILM COATED ORAL
Status: DISCONTINUED | OUTPATIENT
Start: 2025-01-06 | End: 2025-01-07 | Stop reason: HOSPADM

## 2025-01-05 RX ADMIN — SODIUM CHLORIDE 7 MG/HR: 9 INJECTION, SOLUTION INTRAVENOUS at 08:12

## 2025-01-05 RX ADMIN — GUAIFENESIN 600 MG: 600 TABLET, EXTENDED RELEASE ORAL at 14:14

## 2025-01-05 RX ADMIN — CLONIDINE HYDROCHLORIDE 0.3 MG: 0.2 TABLET ORAL at 11:28

## 2025-01-05 RX ADMIN — AMIODARONE HYDROCHLORIDE 200 MG: 200 TABLET ORAL at 11:28

## 2025-01-05 RX ADMIN — WARFARIN SODIUM 7.5 MG: 7.5 TABLET ORAL at 22:20

## 2025-01-05 RX ADMIN — LABETALOL HYDROCHLORIDE 100 MG: 100 TABLET, FILM COATED ORAL at 21:13

## 2025-01-05 RX ADMIN — SODIUM CHLORIDE, PRESERVATIVE FREE 10 ML: 5 INJECTION INTRAVENOUS at 21:13

## 2025-01-05 RX ADMIN — BENZONATATE 100 MG: 100 CAPSULE ORAL at 11:27

## 2025-01-05 RX ADMIN — Medication 100 MG: at 08:14

## 2025-01-05 RX ADMIN — CHLORDIAZEPOXIDE HYDROCHLORIDE 25 MG: 25 CAPSULE ORAL at 08:14

## 2025-01-05 RX ADMIN — ISOSORBIDE MONONITRATE 120 MG: 60 TABLET, EXTENDED RELEASE ORAL at 21:13

## 2025-01-05 RX ADMIN — ACETAMINOPHEN 650 MG: 325 TABLET ORAL at 21:13

## 2025-01-05 RX ADMIN — ISOSORBIDE MONONITRATE 120 MG: 60 TABLET, EXTENDED RELEASE ORAL at 11:27

## 2025-01-05 RX ADMIN — CHLORDIAZEPOXIDE HYDROCHLORIDE 25 MG: 25 CAPSULE ORAL at 21:13

## 2025-01-05 RX ADMIN — SODIUM CHLORIDE 4.5 MG/HR: 9 INJECTION, SOLUTION INTRAVENOUS at 01:27

## 2025-01-05 RX ADMIN — CALCIUM GLUCONATE 2000 MG: 20 INJECTION, SOLUTION INTRAVENOUS at 21:14

## 2025-01-05 RX ADMIN — BENZONATATE 100 MG: 100 CAPSULE ORAL at 04:10

## 2025-01-05 RX ADMIN — SODIUM ZIRCONIUM CYCLOSILICATE 10 G: 10 POWDER, FOR SUSPENSION ORAL at 14:14

## 2025-01-05 RX ADMIN — CHLORDIAZEPOXIDE HYDROCHLORIDE 25 MG: 25 CAPSULE ORAL at 15:06

## 2025-01-05 RX ADMIN — SODIUM ZIRCONIUM CYCLOSILICATE 10 G: 10 POWDER, FOR SUSPENSION ORAL at 21:13

## 2025-01-05 RX ADMIN — ATORVASTATIN CALCIUM 40 MG: 40 TABLET, FILM COATED ORAL at 11:28

## 2025-01-05 RX ADMIN — BENZONATATE 100 MG: 100 CAPSULE ORAL at 21:13

## 2025-01-05 RX ADMIN — SERTRALINE HYDROCHLORIDE 150 MG: 100 TABLET ORAL at 11:27

## 2025-01-05 RX ADMIN — GUAIFENESIN 600 MG: 600 TABLET, EXTENDED RELEASE ORAL at 21:13

## 2025-01-05 RX ADMIN — LABETALOL HYDROCHLORIDE 50 MG: 100 TABLET, FILM COATED ORAL at 08:14

## 2025-01-05 ASSESSMENT — PAIN DESCRIPTION - PAIN TYPE
TYPE: ACUTE PAIN
TYPE: ACUTE PAIN

## 2025-01-05 ASSESSMENT — PAIN SCALES - GENERAL
PAINLEVEL_OUTOF10: 4
PAINLEVEL_OUTOF10: 0
PAINLEVEL_OUTOF10: 6
PAINLEVEL_OUTOF10: 0
PAINLEVEL_OUTOF10: 1

## 2025-01-05 ASSESSMENT — PATIENT HEALTH QUESTIONNAIRE - PHQ9
SUM OF ALL RESPONSES TO PHQ QUESTIONS 1-9: 2
SUM OF ALL RESPONSES TO PHQ9 QUESTIONS 1 & 2: 2
SUM OF ALL RESPONSES TO PHQ QUESTIONS 1-9: 2
2. FEELING DOWN, DEPRESSED OR HOPELESS: SEVERAL DAYS
SUM OF ALL RESPONSES TO PHQ QUESTIONS 1-9: 2
SUM OF ALL RESPONSES TO PHQ QUESTIONS 1-9: 2
1. LITTLE INTEREST OR PLEASURE IN DOING THINGS: SEVERAL DAYS

## 2025-01-05 ASSESSMENT — PAIN DESCRIPTION - FREQUENCY
FREQUENCY: CONTINUOUS
FREQUENCY: CONTINUOUS

## 2025-01-05 ASSESSMENT — LIFESTYLE VARIABLES
HOW OFTEN DO YOU HAVE A DRINK CONTAINING ALCOHOL: 2-3 TIMES A WEEK
HOW MANY STANDARD DRINKS CONTAINING ALCOHOL DO YOU HAVE ON A TYPICAL DAY: 7 TO 9

## 2025-01-05 ASSESSMENT — PAIN DESCRIPTION - DESCRIPTORS
DESCRIPTORS: ACHING
DESCRIPTORS: ACHING

## 2025-01-05 ASSESSMENT — PAIN DESCRIPTION - ORIENTATION
ORIENTATION: LEFT;LOWER
ORIENTATION: LEFT;LOWER

## 2025-01-05 ASSESSMENT — PAIN DESCRIPTION - ONSET
ONSET: ON-GOING
ONSET: ON-GOING

## 2025-01-05 ASSESSMENT — PAIN DESCRIPTION - LOCATION
LOCATION: BACK
LOCATION: BACK

## 2025-01-05 NOTE — CARE COORDINATION
01/05/25 0956   Readmission Assessment   Number of Days since last admission? 8-30 days   Previous Disposition Home with Family   Who is being Interviewed Patient   What was the patient's/caregiver's perception as to why they think they needed to return back to the hospital? Other (Comment)  (felt bad, BP was high)   Did you visit your Primary Care Physician after you left the hospital, before you returned this time? No   Why weren't you able to visit your PCP? Other (Comment)  (cancelled apt as it inferred with HD, was rescheduling.)   Did you see a specialist, such as Cardiac, Pulmonary, Orthopedic Physician, etc. after you left the hospital? Yes   Who advised the patient to return to the hospital? Self-referral   Does the patient report anything that got in the way of taking their medications? Yes   What reasons did they give? Other (Comment)  (Did not pick coumadin up at [facility] OP pharm discharge, then pharmacy was closed for 2 days after that.)   In our efforts to provide the best possible care to you and others like you, can you think of anything that we could have done to help you after you left the hospital the first time, so that you might not have needed to return so soon? Other (Comment)  (Denied, reports he discharge was explained well and he was ready to leave)

## 2025-01-05 NOTE — FLOWSHEET NOTE
01/04/25 1937   Vital Signs   BP (!) 173/88   Temp 98.2 °F (36.8 °C)   Pulse 87   Respirations 18   Post-Hemodialysis Assessment   Post-Treatment Procedures Blood returned;Access bleeding time < 10 minutes   Machine Disinfection Process Acid/Vinegar Clean;Heat Disinfect;Exterior Machine Disinfection   Rinseback Volume (ml) 400 ml   Blood Volume Processed (Liters) 69.3 L   Dialyzer Clearance Lightly streaked   Duration of Treatment (minutes) 180 minutes   Heparin Amount Administered During Treatment (mL) 0 mL   Hemodialysis Intake (ml) 400 ml   Hemodialysis Output (ml) 3700 ml   NET Removed (ml) 3300     stable 3 hour treatment completed htn noted on cardene drip. 3.3 liters net uf. pressure held both cannulation sites x 10 minutes. dressing dry and intact upon discharge from unit. there is a scab on aneurysm the aneurysm looks larger than last admission and skin looks thinner. Dr. Brown informed. report called to primary nurse.

## 2025-01-05 NOTE — PROCEDURES
PROCEDURE NOTE  Date: 1/4/2025   Name: Trav Jennings  YOB: 1965    Procedures  12 lead EKG completed. Results handed to Sherif HAQUE.

## 2025-01-05 NOTE — CARE COORDINATION
01/05/25 1000   Service Assessment   Patient Orientation Alert and Oriented   Cognition Alert   History Provided By Patient   Primary Caregiver Self   Support Systems Spouse/Significant Other;Family Members   Patient's Healthcare Decision Maker is: Patient Declined (Legal Next of Kin Remains as Decision Maker)   PCP Verified by CM Yes   Last Visit to PCP Within last 3 months   Prior Functional Level Independent in ADLs/IADLs   Current Functional Level Independent in ADLs/IADLs   Can patient return to prior living arrangement Yes   Family able to assist with home care needs: Yes   Would you like for me to discuss the discharge plan with any other family members/significant others, and if so, who? No   Financial Resources Medicaid;Medicare   Community Resources Other (Comment)  (HD)   Social/Functional History   Active  Yes   Discharge Planning   Type of Residence House   Living Arrangements Spouse/Significant Other   Current Services Prior To Admission Other (Comment);Oxygen Therapy  (HD)   Current DME Prior to Arrival Oxygen Therapy (Comment)   Potential Assistance Needed N/A   DME Ordered? No   Potential Assistance Purchasing Medications No   Type of Home Care Services None   Patient expects to be discharged to: House   Services At/After Discharge   Transition of Care Consult (CM Consult) Discharge Planning   Services At/After Discharge None   Mode of Transport at Discharge Other (see comment)  (family)   Confirm Follow Up Transport Self   Condition of Participation: Discharge Planning   The Plan for Transition of Care is related to the following treatment goals: feel better     Patient Goals/Plan/Treatment Preferences: Spoke with Trav, he plans home with his wife. Current with [facility] Karen, chair time TTS 0650. Uses RTA or family transports. Has O2 concentrator at home, uses 2-4L (he owns through  [employer] from  DME). He declined services.       If patient is discharged prior to next notation, then this

## 2025-01-06 PROBLEM — E87.5 HYPERKALEMIA: Status: ACTIVE | Noted: 2025-01-06

## 2025-01-06 LAB
ANION GAP SERPL CALC-SCNC: 15 MEQ/L (ref 8–16)
BUN SERPL-MCNC: 61 MG/DL (ref 7–22)
CA-I BLD ISE-SCNC: 1.12 MMOL/L (ref 1.12–1.32)
CALCIUM SERPL-MCNC: 7.9 MG/DL (ref 8.5–10.5)
CHLORIDE SERPL-SCNC: 102 MEQ/L (ref 98–111)
CO2 SERPL-SCNC: 25 MEQ/L (ref 23–33)
CREAT SERPL-MCNC: 9.5 MG/DL (ref 0.4–1.2)
DEPRECATED RDW RBC AUTO: 53.9 FL (ref 35–45)
ERYTHROCYTE [DISTWIDTH] IN BLOOD BY AUTOMATED COUNT: 14.1 % (ref 11.5–14.5)
GFR SERPL CREATININE-BSD FRML MDRD: 6 ML/MIN/1.73M2
GLUCOSE SERPL-MCNC: 94 MG/DL (ref 70–108)
HCT VFR BLD AUTO: 32.2 % (ref 42–52)
HGB BLD-MCNC: 9.7 GM/DL (ref 14–18)
INR PPP: 1.16 (ref 0.85–1.13)
MAGNESIUM SERPL-MCNC: 2.6 MG/DL (ref 1.6–2.4)
MCH RBC QN AUTO: 31.8 PG (ref 26–33)
MCHC RBC AUTO-ENTMCNC: 30.1 GM/DL (ref 32.2–35.5)
MCV RBC AUTO: 105.6 FL (ref 80–94)
PLATELET # BLD AUTO: 105 THOU/MM3 (ref 130–400)
PMV BLD AUTO: 9.5 FL (ref 9.4–12.4)
POTASSIUM SERPL-SCNC: 5.6 MEQ/L (ref 3.5–5.2)
RBC # BLD AUTO: 3.05 MILL/MM3 (ref 4.7–6.1)
SODIUM SERPL-SCNC: 142 MEQ/L (ref 135–145)
WBC # BLD AUTO: 5.9 THOU/MM3 (ref 4.8–10.8)

## 2025-01-06 PROCEDURE — 6370000000 HC RX 637 (ALT 250 FOR IP): Performed by: INTERNAL MEDICINE

## 2025-01-06 PROCEDURE — 99232 SBSQ HOSP IP/OBS MODERATE 35: CPT | Performed by: STUDENT IN AN ORGANIZED HEALTH CARE EDUCATION/TRAINING PROGRAM

## 2025-01-06 PROCEDURE — 36415 COLL VENOUS BLD VENIPUNCTURE: CPT

## 2025-01-06 PROCEDURE — 90935 HEMODIALYSIS ONE EVALUATION: CPT

## 2025-01-06 PROCEDURE — 85027 COMPLETE CBC AUTOMATED: CPT

## 2025-01-06 PROCEDURE — 6370000000 HC RX 637 (ALT 250 FOR IP): Performed by: PHARMACIST

## 2025-01-06 PROCEDURE — 82330 ASSAY OF CALCIUM: CPT

## 2025-01-06 PROCEDURE — 83735 ASSAY OF MAGNESIUM: CPT

## 2025-01-06 PROCEDURE — 6370000000 HC RX 637 (ALT 250 FOR IP)

## 2025-01-06 PROCEDURE — 90935 HEMODIALYSIS ONE EVALUATION: CPT | Performed by: INTERNAL MEDICINE

## 2025-01-06 PROCEDURE — 6370000000 HC RX 637 (ALT 250 FOR IP): Performed by: STUDENT IN AN ORGANIZED HEALTH CARE EDUCATION/TRAINING PROGRAM

## 2025-01-06 PROCEDURE — 80048 BASIC METABOLIC PNL TOTAL CA: CPT

## 2025-01-06 PROCEDURE — 1200000003 HC TELEMETRY R&B

## 2025-01-06 PROCEDURE — 2500000003 HC RX 250 WO HCPCS

## 2025-01-06 PROCEDURE — 85610 PROTHROMBIN TIME: CPT

## 2025-01-06 RX ORDER — LABETALOL 100 MG/1
100 TABLET, FILM COATED ORAL EVERY 8 HOURS SCHEDULED
Status: DISCONTINUED | OUTPATIENT
Start: 2025-01-06 | End: 2025-01-07 | Stop reason: HOSPADM

## 2025-01-06 RX ORDER — WARFARIN SODIUM 7.5 MG/1
7.5 TABLET ORAL ONCE
Status: COMPLETED | OUTPATIENT
Start: 2025-01-06 | End: 2025-01-06

## 2025-01-06 RX ADMIN — SERTRALINE HYDROCHLORIDE 150 MG: 100 TABLET ORAL at 12:21

## 2025-01-06 RX ADMIN — GUAIFENESIN 600 MG: 600 TABLET, EXTENDED RELEASE ORAL at 12:22

## 2025-01-06 RX ADMIN — DOXAZOSIN 4 MG: 4 TABLET ORAL at 12:22

## 2025-01-06 RX ADMIN — GUAIFENESIN 600 MG: 600 TABLET, EXTENDED RELEASE ORAL at 20:13

## 2025-01-06 RX ADMIN — DOXAZOSIN 4 MG: 4 TABLET ORAL at 20:13

## 2025-01-06 RX ADMIN — CHLORDIAZEPOXIDE HYDROCHLORIDE 25 MG: 25 CAPSULE ORAL at 12:32

## 2025-01-06 RX ADMIN — ATORVASTATIN CALCIUM 40 MG: 40 TABLET, FILM COATED ORAL at 12:32

## 2025-01-06 RX ADMIN — LABETALOL HYDROCHLORIDE 100 MG: 100 TABLET, FILM COATED ORAL at 22:03

## 2025-01-06 RX ADMIN — CINACALCET HYDROCHLORIDE 90 MG: 30 TABLET, FILM COATED ORAL at 12:24

## 2025-01-06 RX ADMIN — AMIODARONE HYDROCHLORIDE 200 MG: 200 TABLET ORAL at 12:21

## 2025-01-06 RX ADMIN — WARFARIN SODIUM 7.5 MG: 7.5 TABLET ORAL at 17:39

## 2025-01-06 RX ADMIN — ASPIRIN 81 MG: 81 TABLET, COATED ORAL at 12:27

## 2025-01-06 RX ADMIN — ISOSORBIDE MONONITRATE 120 MG: 60 TABLET, EXTENDED RELEASE ORAL at 12:21

## 2025-01-06 RX ADMIN — SODIUM ZIRCONIUM CYCLOSILICATE 10 G: 10 POWDER, FOR SUSPENSION ORAL at 12:24

## 2025-01-06 RX ADMIN — SODIUM CHLORIDE, PRESERVATIVE FREE 10 ML: 5 INJECTION INTRAVENOUS at 12:33

## 2025-01-06 RX ADMIN — ISOSORBIDE MONONITRATE 120 MG: 60 TABLET, EXTENDED RELEASE ORAL at 20:13

## 2025-01-06 RX ADMIN — CALCITRIOL CAPSULES 0.25 MCG 0.5 MCG: 0.25 CAPSULE ORAL at 12:22

## 2025-01-06 RX ADMIN — SODIUM CHLORIDE, PRESERVATIVE FREE 10 ML: 5 INJECTION INTRAVENOUS at 20:13

## 2025-01-06 RX ADMIN — CLONIDINE HYDROCHLORIDE 0.3 MG: 0.2 TABLET ORAL at 20:13

## 2025-01-06 RX ADMIN — Medication 100 MG: at 12:21

## 2025-01-06 RX ADMIN — AMLODIPINE BESYLATE 10 MG: 10 TABLET ORAL at 12:23

## 2025-01-06 ASSESSMENT — PAIN SCALES - GENERAL: PAINLEVEL_OUTOF10: 0

## 2025-01-06 NOTE — CARE COORDINATION
1/6/25, 9:22 AM EST    DISCHARGE PLANNING EVALUATION    Received Social Work consult “For consideration of Rehab”.  Addiction/LIZ  consulted.   met with patient, following for needs.  Full Social Consult deferred.

## 2025-01-06 NOTE — CARE COORDINATION
1/6/25, 3:04 PM EST    DISCHARGE ON GOING EVALUATION    Trav Jennings       Hospital day: 2  Location: -28/028-A Reason for admit: Hyperkalemia [E87.5]  Cocaine abuse (HCC) [F14.10]  Hypertensive emergency [I16.1]  Severe hypertension [I10]  Chest pain, unspecified type [R07.9]     Procedures: none    Imaging since last note: 1/4 CXR: 1. Stable cardiomegaly and enlargement of the mediastinal contours.   2. Stable pleural and parenchymal changes in the right lung base.   3. No new abnormalities.   1/4 CTA Chest W WO:   1. Patient is status post prior thoracic aortic stent graft placement along the thoracic aortic aneurysm which extends from the proximal transverse aortic arch to the distal thoracic aorta. Linear calcifications are present within the mural thrombus within the aneurysmal sac. The aneurysm sac along the transverse aorta measures up to 5.9 cm in diameter. Previously this measured 5.8 cm in diameter. The proximal descending thoracic aortic aneurysm sac measures approximate 4.7 cmin diameter. Previously this measured approximately 5.2 cm in diameter.   2. There is eccentric mural thrombus along the distal thoracic aorta. At the level of the hiatus there is a descending aortic dissection flap demonstrated. This is incompletely visualized.  3. There is visualization of bilateral atrophic kidneys. These demonstrate several small low-density lesions bilaterally which may be related to renal cyst. However, these are incompletely characterized.    Barriers to Discharge: Admitted from ER over the weekend with chest pain. Reported trigger for chest pain was etoh and drugs. \"Recent partying and smoke cocaine\". Nephrology and Cardiothoracic surgery consulted. No surgical intervention at this time.  Now has been weaned off Nitro and Cardene gtts.      PCP: Radha Hou, APRN - CNP  Readmission Risk Score: 32.1    Patient Goals/Plan/Treatment Preferences: Planning to return home with his wife.   Current [facility] Karen

## 2025-01-06 NOTE — FLOWSHEET NOTE
01/06/25 0723 01/06/25 1147   Vital Signs   BP (!) 163/85 (!) 147/70   Temp 97.7 °F (36.5 °C) 97.3 °F (36.3 °C)   Pulse 74 82   Respirations 17 21   SpO2 97 % 96 %   Weight - Scale 97.5 kg (214 lb 15.2 oz) 94.5 kg (208 lb 5.4 oz)   Weight Method Standing scale Estimated   Percent Weight Change -0.81 -3.08   Post-Hemodialysis Assessment   Post-Treatment Procedures  --  Blood returned;Access bleeding time < 10 minutes   Machine Disinfection Process  --  Acid/Vinegar Clean;Heat Disinfect;Exterior Machine Disinfection   Blood Volume Processed (Liters)  --  115.4 L   Dialyzer Clearance  --  Lightly streaked   Duration of Treatment (minutes)  --  240 minutes   Heparin Amount Administered During Treatment (mL)  --  0 mL   Hemodialysis Intake (ml)  --  400 ml   Hemodialysis Output (ml)  --  3400 ml   NET Removed (ml)  --  3000     4 hour treatment completed. 3 L of fluid removed as ordered.  Pressure held to access for 10 mins x2. Dressing clean/dry and intact upon leaving the unit. Report given to primary RN. Charting printed and placed in bin to be scanned into EMR.

## 2025-01-06 NOTE — CONSULTS
Brief Intervention and Referral to Treatment Summary    Patient was provided PHQ-9, AUDIT-C and DAST Screening:      PHQ-9 Score: 2  AUDIT-C Score:  9  DAST Score:  8    Patient’s substance use is considered     Dependent      Patient’s depression is considered:     Mild    Brief Education Was Provided    Patient was receptive      Brief Intervention Is Provided (Only for AUDIT-C or DAST)     Patient reports readiness to decrease and/or stop use and a plan was discussed       Recommendations/Referrals for Brief and/or Specialized Treatment Provided to Patient:      Pt reportedly drink alcohol twice per week, 4-5 (24oz) beers. Pt report crack cocaine use twice per week \"or more\".  Pt report mild depression, denies SI/HI, denies hallucinations, no delusions noted. Pt prefer outpatient AOD/mental health treatment at this time. A list of resources was given to and discussed with pt. This resource packet include outpatient AOD/mental health treatment facilities, detox facilities and other available resources.   
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    
Patient seen and examined during dialysis treatment  Blood pressure currently in systolic 180s to 190s  Currently on Cardene drip  No chest pain  No shortness of breath  Ultrafiltration 3 to 3.5 kg  HD prescription reviewed  Discussed with patient  Discussed with HD RN  
the morning and at bedtime 8/6/24 12/4/24  Pat Carney MD   sertraline (ZOLOFT) 50 MG tablet Take 3 tablets by mouth daily 8/6/24   Pat Carney MD   cloNIDine (CATAPRES) 0.3 MG tablet Take 1 tablet by mouth 3 times daily 8/6/24   Pat Carney MD   doxazosin (CARDURA) 4 MG tablet Take 1 tablet by mouth every 12 hours 8/6/24 12/4/24  Pat Carney MD   hydrALAZINE (APRESOLINE) 100 MG tablet Take 1 tablet by mouth every 8 (eight) hours 8/6/24 12/4/24  Pat Carney MD   amLODIPine (NORVASC) 10 MG tablet Take 1 tablet by mouth daily 8/6/24 12/4/24  Pat Carney MD   traZODone (DESYREL) 100 MG tablet Take 1 tablet by mouth nightly as needed for Sleep 4/16/24   Ian Holm MD   cinacalcet (SENSIPAR) 90 MG tablet Take 1 tablet by mouth three times a week 4/17/24 12/4/24  Umesh Chou DO   calcitRIOL (ROCALTROL) 0.5 MCG capsule Take 1 capsule by mouth three times a week 4/15/24 12/4/24  Umesh Chou DO   atorvastatin (LIPITOR) 80 MG tablet Take 0.5 tablets by mouth daily 4/15/24 12/4/24  Umesh Chou DO   albuterol sulfate HFA (VENTOLIN HFA) 108 (90 Base) MCG/ACT inhaler Inhale 2 puffs into the lungs every 6 hours as needed for Wheezing    Provider, MD Jaime       Review of Systems:  Constitutional: Negative for fever, fatigue or chills. Reports good appetite  Head: Negative for headaches  Eyes: Negative for blurry vision or discharge  Ears: Negative for ear pain or hearing changes  Nose: Negative for runny nose or epistaxis  Respiratory:   Negative for cough or sputum production. Negative for hemoptysis  GI: Negative for nausea, vomiting and diarrhea.  Negative for hematochezia and melena  Skin: Negative for rash  Musculoskeletal: Negative for joint pain, moves all ext  Neuro: Negative for numbness or tingling, negative for slurred speech  Psychiatric: Reports stable mood, negative for depression or insomnia    All other review of

## 2025-01-06 NOTE — DISCHARGE INSTRUCTIONS
F/U OV with Dr. Garcia on 1/21/25 at 2 pm.  (Will discuss further imaging of TEVAR device at OV as currently scheduled to discuss fistula)  Continue antihypertensives - added labetalol, discontinued doxazosin, and decreased amlodipine  Continue taking warfarin as prescribed, follow up later this week for INR check  Continue dialysis as scheduled    Follow-up with PCP in about 1 week.

## 2025-01-07 VITALS
OXYGEN SATURATION: 98 % | HEIGHT: 75 IN | HEART RATE: 77 BPM | TEMPERATURE: 98.1 F | DIASTOLIC BLOOD PRESSURE: 64 MMHG | RESPIRATION RATE: 18 BRPM | WEIGHT: 207.23 LBS | SYSTOLIC BLOOD PRESSURE: 153 MMHG | BODY MASS INDEX: 25.77 KG/M2

## 2025-01-07 LAB — INR PPP: 1.15 (ref 0.85–1.13)

## 2025-01-07 PROCEDURE — 6370000000 HC RX 637 (ALT 250 FOR IP)

## 2025-01-07 PROCEDURE — 2500000003 HC RX 250 WO HCPCS

## 2025-01-07 PROCEDURE — 85610 PROTHROMBIN TIME: CPT

## 2025-01-07 PROCEDURE — 6370000000 HC RX 637 (ALT 250 FOR IP): Performed by: STUDENT IN AN ORGANIZED HEALTH CARE EDUCATION/TRAINING PROGRAM

## 2025-01-07 PROCEDURE — 99239 HOSP IP/OBS DSCHRG MGMT >30: CPT | Performed by: STUDENT IN AN ORGANIZED HEALTH CARE EDUCATION/TRAINING PROGRAM

## 2025-01-07 PROCEDURE — 90935 HEMODIALYSIS ONE EVALUATION: CPT | Performed by: INTERNAL MEDICINE

## 2025-01-07 PROCEDURE — 90935 HEMODIALYSIS ONE EVALUATION: CPT

## 2025-01-07 PROCEDURE — 6370000000 HC RX 637 (ALT 250 FOR IP): Performed by: PHARMACIST

## 2025-01-07 PROCEDURE — 6370000000 HC RX 637 (ALT 250 FOR IP): Performed by: INTERNAL MEDICINE

## 2025-01-07 RX ORDER — DOXAZOSIN 4 MG/1
4 TABLET ORAL EVERY 12 HOURS SCHEDULED
Qty: 60 TABLET | Refills: 1 | Status: CANCELLED | OUTPATIENT
Start: 2025-01-07 | End: 2025-03-08

## 2025-01-07 RX ORDER — LABETALOL 100 MG/1
100 TABLET, FILM COATED ORAL EVERY 8 HOURS SCHEDULED
Qty: 60 TABLET | Refills: 3 | Status: SHIPPED | OUTPATIENT
Start: 2025-01-07

## 2025-01-07 RX ORDER — AMLODIPINE BESYLATE 5 MG/1
5 TABLET ORAL DAILY
Status: DISCONTINUED | OUTPATIENT
Start: 2025-01-08 | End: 2025-01-07 | Stop reason: HOSPADM

## 2025-01-07 RX ORDER — AMLODIPINE BESYLATE 10 MG/1
10 TABLET ORAL DAILY
Qty: 30 TABLET | Refills: 1 | Status: CANCELLED | OUTPATIENT
Start: 2025-01-07 | End: 2025-03-08

## 2025-01-07 RX ORDER — AMLODIPINE BESYLATE 5 MG/1
5 TABLET ORAL DAILY
Qty: 30 TABLET | Refills: 3 | Status: SHIPPED | OUTPATIENT
Start: 2025-01-08

## 2025-01-07 RX ORDER — HYDRALAZINE HYDROCHLORIDE 100 MG/1
100 TABLET, FILM COATED ORAL EVERY 8 HOURS
Qty: 90 TABLET | Refills: 1 | Status: SHIPPED | OUTPATIENT
Start: 2025-01-07 | End: 2025-03-08

## 2025-01-07 RX ADMIN — SODIUM ZIRCONIUM CYCLOSILICATE 10 G: 10 POWDER, FOR SUSPENSION ORAL at 12:32

## 2025-01-07 RX ADMIN — ATORVASTATIN CALCIUM 40 MG: 40 TABLET, FILM COATED ORAL at 12:34

## 2025-01-07 RX ADMIN — Medication 4.8 MG: at 05:04

## 2025-01-07 RX ADMIN — BENZONATATE 100 MG: 100 CAPSULE ORAL at 12:47

## 2025-01-07 RX ADMIN — SERTRALINE HYDROCHLORIDE 150 MG: 100 TABLET ORAL at 12:33

## 2025-01-07 RX ADMIN — AMIODARONE HYDROCHLORIDE 200 MG: 200 TABLET ORAL at 12:34

## 2025-01-07 RX ADMIN — WARFARIN SODIUM 12.5 MG: 10 TABLET ORAL at 12:32

## 2025-01-07 RX ADMIN — ISOSORBIDE MONONITRATE 120 MG: 60 TABLET, EXTENDED RELEASE ORAL at 12:32

## 2025-01-07 RX ADMIN — SODIUM CHLORIDE, PRESERVATIVE FREE 10 ML: 5 INJECTION INTRAVENOUS at 12:32

## 2025-01-07 RX ADMIN — GUAIFENESIN 600 MG: 600 TABLET, EXTENDED RELEASE ORAL at 12:34

## 2025-01-07 RX ADMIN — HYDRALAZINE HYDROCHLORIDE 100 MG: 50 TABLET ORAL at 00:31

## 2025-01-07 RX ADMIN — Medication 100 MG: at 12:31

## 2025-01-07 RX ADMIN — ASPIRIN 81 MG: 81 TABLET, COATED ORAL at 12:31

## 2025-01-07 RX ADMIN — LABETALOL HYDROCHLORIDE 100 MG: 100 TABLET, FILM COATED ORAL at 12:31

## 2025-01-07 ASSESSMENT — PAIN SCALES - GENERAL: PAINLEVEL_OUTOF10: 0

## 2025-01-07 NOTE — PROGRESS NOTES
Clinical Pharmacy Note                                               Warfarin Discharge Recommendations    Patient discharged from [facility] today on warfarin.    Warfarin indication: Atrial fibrillation/Atrial flutter  INR goal during admission: 2.0-3.0  Previous home warfarin regimen: New start 12/30/24  Interacting medications at discharge: Amiodarone, aspirin  Coumadin 7.5 mg tabs    Hospital Warfarin Doses & INR Results    Concurrent anticoagulants/antiplatelets: aspirin  Significant warfarin drug-drug interactions: Amiodarone     Date INR Warfarin Dose   1/4/25 1.74 ---   1/5/25 1.21 7.5 mg    1/6/2025 1.16  7.5 mg    1/7/2025  1.15 12.5 mg      Recent INRs:  Recent Labs     01/07/25  0948   INR 1.15*     Warfarin Discharge Instructions:   Date Warfarin Dose   1/8/25 (tomorrow) 7.5 mg (1 tablet)   1/9/25 7.5 mg (1 tablet)     Provider dosing warfarin:  [facility]   Next INR date: Friday, 1/10/2025 9:40 AM     Arlene Hou PharmD 1/7/2025 11:15 AM    
1220 - Consult with DO Jose; patient to be admitted medically   
CLINICAL PHARMACY: DISCHARGE MED RECONCILIATION/REVIEW    [facility] Select Patient?: No  Total # of Interventions Recommended: 0   -   Total # Interventions Accepted: 0  Intervention Severity:   - Level 1 Intervention Present?: No   - Level 2 #: 0   - Level 3 #: 0   Time Spent (min): Thang Hou PharmD 1/7/2025 11:41 AM  
[facility]   Nephrology progress note  1/5/2025, 11:24 AM      Pt Name:    Trav Jennings  MRN:     [id number]     YOB: 1965  Admit Date:    1/4/2025 11:27 AM    Chief Complaint: Nephrology following for ESRD/Hyperkalemia    Subjective:  Patient was seen and examined this morning  No chest pain or shortness of breath  Feels okay    Objective:  24HR INTAKE/OUTPUT:    Intake/Output Summary (Last 24 hours) at 1/5/2025 1124  Last data filed at 1/5/2025 0327  Gross per 24 hour   Intake 700 ml   Output 3700 ml   Net -3000 ml         I/O last 3 completed shifts:  In: 700 [P.O.:300]  Out: 3700   No intake/output data recorded.   Admission weight: 94 kg (207 lb 3.7 oz)  Wt Readings from Last 3 Encounters:   01/04/25 94.5 kg (208 lb 4.8 oz)   12/26/24 95.4 kg (210 lb 5.1 oz)   12/07/24 96 kg (211 lb 10.3 oz)        Vitals :   Vitals:    01/05/25 0552 01/05/25 0658 01/05/25 0745 01/05/25 1101   BP: (!) 140/68 (!) 148/55 (!) 163/65 (!) 169/59   Pulse: 76 78 80 74   Resp:   17 16   Temp:   97.8 °F (36.6 °C) 98.3 °F (36.8 °C)   TempSrc:   Oral Oral   SpO2:   97% 98%   Weight:       Height:           Physical examination  General Appearance: alert and cooperative with exam, appears comfortable, no distress  Mouth/Throat: Oral mucosa moist  Neck: No JVD  Lungs: Air entry B/L, no rales, no use of accessory muscles  Heart:  S1, S2 heard  GI: soft, non-tender, no guarding  Extremities: no LE edema    Medications:  Infusion:    dextrose      niCARdipine 7 mg/hr (01/05/25 0812)    sodium chloride       Meds:    amiodarone  200 mg Oral Daily    [Held by provider] amLODIPine  10 mg Oral Daily    atorvastatin  40 mg Oral Daily    [Held by provider] aspirin  81 mg Oral Daily    [START ON 1/6/2025] calcitRIOL  0.5 mcg Oral Once per day on Monday Wednesday Friday    [START ON 1/6/2025] cinacalcet  90 mg Oral Once per day on Monday Wednesday Friday    cloNIDine  0.3 mg Oral TID    [Held by provider] doxazosin  
[facility]   Nephrology progress note  1/6/2025, 8:36 AM      Pt Name:    Trav Jennings  MRN:     [id number]     YOB: 1965  Admit Date:    1/4/2025 11:27 AM    Chief Complaint: Nephrology following for ESRD/Hyperkalemia    Subjective:  Patient was seen and examined this morning  No chest pain or shortness of breath  Feels okay  Seen during HD treatment    Objective:  24HR INTAKE/OUTPUT:    Intake/Output Summary (Last 24 hours) at 1/6/2025 0836  Last data filed at 1/6/2025 0702  Gross per 24 hour   Intake 2244.7 ml   Output --   Net 2244.7 ml         I/O last 3 completed shifts:  In: 3064.7 [P.O.:1140; I.V.:1384.2; IV Piggyback:140.5]  Out: 3700   No intake/output data recorded.   Admission weight: 94 kg (207 lb 3.7 oz)  Wt Readings from Last 3 Encounters:   01/06/25 97.5 kg (214 lb 15.2 oz)   12/26/24 95.4 kg (210 lb 5.1 oz)   12/07/24 96 kg (211 lb 10.3 oz)        Vitals :   Vitals:    01/05/25 2318 01/06/25 0317 01/06/25 0323 01/06/25 0723   BP: (!) 123/56 (!) 125/57  (!) 163/85   Pulse: 66 66  74   Resp: 16 16  17   Temp: 97.6 °F (36.4 °C) 97.9 °F (36.6 °C)  97.7 °F (36.5 °C)   TempSrc: Oral Oral     SpO2: 98% 96%  97%   Weight:   98.3 kg (216 lb 11.2 oz) 97.5 kg (214 lb 15.2 oz)   Height:           Physical examination  General Appearance: alert and cooperative with exam, appears comfortable, no distress  Neck: No JVD visibly noted  Lungs: no use of accessory muscles  Extremities: no sig LE edema noted    Medications:  Infusion:    dextrose      niCARdipine Stopped (01/05/25 1225)    sodium chloride       Meds:    labetalol  100 mg Oral 3 times per day    amiodarone  200 mg Oral Daily    amLODIPine  10 mg Oral Daily    atorvastatin  40 mg Oral Daily    aspirin  81 mg Oral Daily    calcitRIOL  0.5 mcg Oral Once per day on Monday Wednesday Friday    cinacalcet  90 mg Oral Once per day on Monday Wednesday Friday    cloNIDine  0.3 mg Oral TID    doxazosin  4 mg Oral 2 times per day 
Pt admitted to  [facility] via stretcher.     Complaints: Chest pain    IV Nicardipine infusing into the forearm right, condition patent and no redness at a rate of 11 mg/hr at 110ml/hr. IV site free of s/s of infection or infiltration.     Vital signs obtained. Assessment and data collection initiated. Two nurse skin assessment performed by Staice HAQUE and Sherif HAQUE.    Swallow Screen completed and documented for all patients ages 45 and older. Passed.    Policies and procedures for  explained. All questions answered with no further questions at this time. Fall prevention and safety brochure discussed with patient.  Bed alarm on. Call light in reach. Oriented to room.      
Spiritual Health History and Assessment/Progress Note  [facility]    Initial Encounter,  ,  ,      Name: Trav Jennings MRN: [id number]    Age: 59 y.o.     Sex: male   Language: English   Yazidism: Religious   Severe hypertension     Date: 1/6/2025            Total Time Calculated: (P) 7 min              Spiritual Assessment began in [facility] ICU STEPDOWN TELEMETRY 4K        Referral/Consult From: Rounding   Encounter Overview/Reason: Initial Encounter  Service Provided For: Patient    Elizabeth, Belief, Meaning:   Patient identifies as spiritual  Family/Friends No family/friends present      Importance and Influence:  Patient has spiritual/personal beliefs that influence decisions regarding their health  Family/Friends No family/friends present    Community:  Patient is connected with a spiritual community  Family/Friends No family/friends present    Assessment and Plan of Care:     Patient Interventions include: Facilitated expression of thoughts and feelings  Family/Friends Interventions include: No family/friends present    Patient Plan of Care: Spiritual Care available upon further referral  Family/Friends Plan of Care: No family/friends present    Electronically signed by FLAVIA Peters on 1/6/2025 at 5:14 PM   
Warfarin Pharmacy Consult Note    Trav Jennings is a 59 y.o. male for whom pharmacy has been asked to manage warfarin therapy.     Consulting Provider: Juloi Cesar Grande MD    Indication: Atrial fibrillation/Atrial flutter  Target INR: 2.0-3.0   Warfarin dose prior to admission: [facility] instructed pt to take Warfarin 7.5 mg today, 7.5 mg 12/31, 3.75 mg 1/1, 3.75 mg 1/2   Outpatient warfarin provider: [facility]    Recent Labs     01/04/25  1210 01/05/25  0737   HGB 11.1* 11.1*    119*     Recent Labs     01/04/25  1210 01/05/25  0737   INR 1.74* 1.21*     Concurrent anticoagulants/antiplatelets: aspirin  Significant warfarin drug-drug interactions: Amiodarone    Date INR Warfarin Dose   1/5/25 1.21 7.5 mg                                   INR will be monitored routinely until therapeutic INR is achieved.    Pharmacy will continue to follow. Thank you for the consult,   Jade Lanier, PharmD  1/5/2025 6:58 PM      
Warfarin Pharmacy Consult Note    Warfarin Indication: Atrial fibrillation/Atrial flutter  Target INR: 2.0-3.0  Dose prior to admission: New start 12/30/24    Recent Labs     01/04/25  1210 01/05/25  0737 01/06/25  0544   HGB 11.1* 11.1* 9.7*    119* 105*     Recent Labs     01/04/25  1210 01/05/25  0737 01/06/25  0544   INR 1.74* 1.21* 1.16*     Concurrent anticoagulants/antiplatelets: aspirin  Significant warfarin drug-drug interactions: Amiodarone     Date INR Warfarin Dose   1/4/25 1.74 ---   1/5/25 1.21 7.5 mg    1/6/2025 1.16  7.5 mg                                              Monitoring:                   INR will be monitored daily.    **Please contact pharmacy for discharge instructions when indicated**    Arlene McallisterD 1/6/2025 12:16 PM      
   sertraline  150 mg Oral Daily    thiamine  100 mg Oral Daily    guaiFENesin  600 mg Oral BID    warfarin placeholder: dosing by pharmacy   Oral RX Placeholder    albuterol  5 mg Nebulization Once    sodium chloride flush  5-40 mL IntraVENous 2 times per day     Meds prn: Menthol, traZODone, benzonatate, acetaminophen, glucose, dextrose bolus **OR** dextrose bolus, glucagon (rDNA), dextrose, sodium chloride flush, sodium chloride     Lab Data :  CBC:   Recent Labs     01/04/25  1210 01/05/25  0737 01/06/25  0544   WBC 9.8 7.6 5.9   HGB 11.1* 11.1* 9.7*   HCT 35.7* 35.9* 32.2*    119* 105*     CMP:  Recent Labs     01/04/25  1210 01/05/25  0737 01/06/25  0544     132* 142 142   K 5.7*  8.3* 5.4* 5.6*   CL 97* 103 102   CO2 22* 26 25   BUN 63* 45* 61*   CREATININE 9.0* 8.0* 9.5*   GLUCOSE 94 82 94   CALCIUM 9.1 8.2* 7.9*   MG  --  2.3 2.6*     Hepatic:   Recent Labs     01/04/25  1210   AST 15   ALT 9*   BILITOT 0.3   ALKPHOS 100         Diagnostics:  Chest x-ray shows cardiomegaly  EF 60 to 65%     Impression and Plan:  ESRD on hemodialysis TTS  Seen on HD today  Using 2K bath  Discussed with dialysis staff  Hyperkalemia.  Add Lokelma  Hypertensive urgency in setting of recent cocaine use.  Patient with history of chronically uncontrolled hypertension.  Overall improved  Cocaine use  History of aortic dissection  Anemia in ESRD. KENYETTA if needed  History of A-fib status post recent ALEISHA cardioversion    David Brown MD  [facility]    This report has been created using voice recognition software. It may contain minor errors which are inherent in voice recognition technology  
chloride flush, sodium chloride, LORazepam **OR** LORazepam **OR** LORazepam **OR** LORazepam **OR** LORazepam **OR** LORazepam **OR** LORazepam **OR** LORazepam    Exam:  BP (!) 163/65   Pulse 80   Temp 97.8 °F (36.6 °C) (Oral)   Resp 17   Ht 1.905 m (6' 3\")   Wt 94.5 kg (208 lb 4.8 oz)   SpO2 97%   BMI 26.04 kg/m²   General: No distress, appears stated age.  Eyes:  PERRL. Conjunctivae/corneas clear.  HENT: Head normal appearing. Nares normal. Oral mucosa moist.  Hearing intact.   Neck: Supple, with full range of motion. Trachea midline.  No gross JVD appreciated.  Respiratory:  Normal effort. Clear to auscultation, without rales or wheezes or rhonchi.  Cardiovascular: Normal rate, regular rhythm with normal S1/S2 systolic crescendo decrescendo murmur S3/6 with radiation to carotids bilaterally.  No lower extremity edema.   Abdomen: Soft, non-tender, non-distended with normal bowel sounds.  Musculoskeletal: No joint swelling or tenderness. Normal tone. No abnormal movements.   Skin: Warm and dry. No rashes or lesions.  Neurologic:  No focal sensory/motor deficits in the upper or lower extremities. Cranial nerves:  grossly non-focal 2-12.     Psychiatric: Alert and oriented, normal insight and thought content.   Capillary Refill: Brisk,< 3 seconds.  Peripheral Pulses: +2 palpable, equal bilaterally.       Labs/Radiology: See chart or assessment above.     Electronically signed by Julio Cesar Grande MD IM PGY-1 on 1/5/2025 at 8:48 AM  Case was discussed with Attending, Dr. Riley.     
Jessica Horne        CTA CHEST W WO CONTRAST    Addendum Date: 1/4/2025    Addendum: In addition to the original findings, there is no endoleak noted. This was discussed with Dr. Garcia on 1/4/2025 at 2:46 p.m. Electronically signed by Dr. Cecilio Lee    Result Date: 1/4/2025  PROCEDURE: CTA CHEST W WO CONTRAST CLINICAL INFORMATION: ho dissection and repair. COMPARISON: 2/3/2024 TECHNIQUE: Patient is status post prior thoracic aortic stent graft placement along the thoracic aortic aneurysm which extends from the proximal transverse aortic arch to the distal thoracic aorta. Linear calcifications are present within the mural thrombus within the aneurysmal sac. FINDINGS: Patient is status post prior thoracic aortic stent graft placement along the thoracic aortic aneurysm which extends from the proximal transverse aortic arch to the distal thoracic aorta. Linear calcifications are present within the mural thrombus within the aneurysmal sac. The aneurysm sac along the transverse aorta measures up to 5.9 cm in diameter. Previously this measured 5.8 cm in diameter. The proximal descending thoracic aortic aneurysm sac measures approximate 4.7 cm in diameter. Previously this measured approximately 5.2 cm in diameter. There is eccentric mural thrombus along the distal thoracic aorta. At the level of the hiatus there is a descending aortic dissection flap demonstrated. This is incompletely visualized. However, there is visualization of bilateral atrophic kidneys. These demonstrate several small low-density lesions bilaterally which may be related to renal cyst. However, these are incompletely characterized. Several small prevascular and para-aortic lymph nodes are seen. These appear similar compared to previous examination. The central airways appear patent. No focal consolidation is seen. Parenchymal bandlike irregular scarring is noted at the right lung base which appears similar compared to previous CT examination. No

## 2025-01-07 NOTE — CARE COORDINATION

## 2025-01-07 NOTE — DISCHARGE INSTR - MEDS
Warfarin Discharge Instructions:   Date Warfarin Dose   1/8/25 (tomorrow) 7.5 mg (1 tablet)   1/9/25 7.5 mg (1 tablet)     Provider dosing warfarin: [facility]   Next INR date: Friday, 1/10/2025 9:40 AM

## 2025-01-07 NOTE — PLAN OF CARE
Problem: Chronic Conditions and Co-morbidities  Goal: Patient's chronic conditions and co-morbidity symptoms are monitored and maintained or improved  Flowsheets (Taken 1/5/2025 0230)  Care Plan - Patient's Chronic Conditions and Co-Morbidity Symptoms are Monitored and Maintained or Improved:   Monitor and assess patient's chronic conditions and comorbid symptoms for stability, deterioration, or improvement   Collaborate with multidisciplinary team to address chronic and comorbid conditions and prevent exacerbation or deterioration   Update acute care plan with appropriate goals if chronic or comorbid symptoms are exacerbated and prevent overall improvement and discharge     Problem: Discharge Planning  Goal: Discharge to home or other facility with appropriate resources  Flowsheets (Taken 1/5/2025 0230)  Discharge to home or other facility with appropriate resources:   Identify barriers to discharge with patient and caregiver   Arrange for needed discharge resources and transportation as appropriate   Identify discharge learning needs (meds, wound care, etc)     Problem: Pain  Goal: Verbalizes/displays adequate comfort level or baseline comfort level  Flowsheets (Taken 1/5/2025 0230)  Verbalizes/displays adequate comfort level or baseline comfort level:   Encourage patient to monitor pain and request assistance   Assess pain using appropriate pain scale   Administer analgesics based on type and severity of pain and evaluate response   Implement non-pharmacological measures as appropriate and evaluate response   Consider cultural and social influences on pain and pain management   Notify Licensed Independent Practitioner if interventions unsuccessful or patient reports new pain     Problem: Safety - Adult  Goal: Free from fall injury  Flowsheets (Taken 1/5/2025 0230)  Free From Fall Injury:   Instruct family/caregiver on patient safety   Based on caregiver fall risk screen, instruct family/caregiver to ask for 
  Problem: Chronic Conditions and Co-morbidities  Goal: Patient's chronic conditions and co-morbidity symptoms are monitored and maintained or improved  Outcome: Adequate for Discharge     Problem: Discharge Planning  Goal: Discharge to home or other facility with appropriate resources  Outcome: Adequate for Discharge     Problem: Pain  Goal: Verbalizes/displays adequate comfort level or baseline comfort level  Outcome: Adequate for Discharge     Problem: Safety - Adult  Goal: Free from fall injury  Outcome: Adequate for Discharge     Problem: Cardiovascular - Adult  Goal: Maintains optimal cardiac output and hemodynamic stability  Outcome: Adequate for Discharge  Goal: Absence of cardiac dysrhythmias or at baseline  Outcome: Adequate for Discharge     Problem: Risk for Elopement  Goal: Patient will not exit the unit/facility without proper excort  Outcome: Adequate for Discharge     Problem: ABCDS Injury Assessment  Goal: Absence of physical injury  Outcome: Adequate for Discharge     Problem: Skin/Tissue Integrity - Adult  Goal: Skin integrity remains intact  Outcome: Adequate for Discharge     Problem: Genitourinary - Adult  Goal: Absence of urinary retention  Outcome: Adequate for Discharge     Problem: Drug Abuse/Detox  Goal: Will have no detox symptoms and will verbalize plan for changing drug-related behavior  Description: INTERVENTIONS:  1. Administer medication as ordered  2. Monitor physical status  3. Provide emotional support with 1:1 interaction with staff  4. Encourage  recovery focused treatment   Outcome: Adequate for Discharge   Care plan reviewed with patient.  Patient verbalizes understanding of the care plan and contributed to goal setting.    
  Problem: Chronic Conditions and Co-morbidities  Goal: Patient's chronic conditions and co-morbidity symptoms are monitored and maintained or improved  Outcome: Progressing  Flowsheets (Taken 1/5/2025 1806)  Care Plan - Patient's Chronic Conditions and Co-Morbidity Symptoms are Monitored and Maintained or Improved:   Monitor and assess patient's chronic conditions and comorbid symptoms for stability, deterioration, or improvement   Collaborate with multidisciplinary team to address chronic and comorbid conditions and prevent exacerbation or deterioration   Update acute care plan with appropriate goals if chronic or comorbid symptoms are exacerbated and prevent overall improvement and discharge     Problem: Discharge Planning  Goal: Discharge to home or other facility with appropriate resources  Outcome: Progressing  Flowsheets (Taken 1/5/2025 1806)  Discharge to home or other facility with appropriate resources:   Identify barriers to discharge with patient and caregiver   Identify discharge learning needs (meds, wound care, etc)   Refer to discharge planning if patient needs post-hospital services based on physician order or complex needs related to functional status, cognitive ability or social support system   Arrange for needed discharge resources and transportation as appropriate     Problem: Pain  Goal: Verbalizes/displays adequate comfort level or baseline comfort level  Outcome: Progressing  Flowsheets (Taken 1/5/2025 1806)  Verbalizes/displays adequate comfort level or baseline comfort level:   Encourage patient to monitor pain and request assistance   Administer analgesics based on type and severity of pain and evaluate response   Consider cultural and social influences on pain and pain management   Assess pain using appropriate pain scale   Implement non-pharmacological measures as appropriate and evaluate response   Notify Licensed Independent Practitioner if interventions unsuccessful or patient 
  Problem: Discharge Planning  Goal: Discharge to home or other facility with appropriate resources  1/6/2025 0922 by Lesia Hernandez, NITISH  Outcome: Progressing  See SW note     
optimal cardiac output and hemodynamic stability: Monitor blood pressure and heart rate  Taken 1/5/2025 2113 by Farzaneh Cha RN  Maintains optimal cardiac output and hemodynamic stability: Monitor blood pressure and heart rate  Taken 1/5/2025 1806 by Ada Pierce RN  Maintains optimal cardiac output and hemodynamic stability:   Monitor blood pressure and heart rate   Assess for signs of decreased cardiac output   Administer vasoactive medications as ordered   Monitor urine output and notify Licensed Independent Practitioner for values outside of normal range   Administer fluid and/or volume expanders as ordered  1/5/2025 1806 by Ada Pierce RN  Outcome: Progressing  Flowsheets (Taken 1/5/2025 1806)  Maintains optimal cardiac output and hemodynamic stability:   Monitor blood pressure and heart rate   Assess for signs of decreased cardiac output   Administer vasoactive medications as ordered   Monitor urine output and notify Licensed Independent Practitioner for values outside of normal range   Administer fluid and/or volume expanders as ordered  Goal: Absence of cardiac dysrhythmias or at baseline  1/6/2025 0124 by Farzaneh Cha RN  Outcome: Progressing  Flowsheets  Taken 1/6/2025 0124 by Farzaneh Cha RN  Absence of cardiac dysrhythmias or at baseline: Monitor cardiac rate and rhythm  Taken 1/5/2025 2113 by Farzaneh Cha RN  Absence of cardiac dysrhythmias or at baseline: Monitor cardiac rate and rhythm  Taken 1/5/2025 1806 by Ada Pierce RN  Absence of cardiac dysrhythmias or at baseline:   Monitor cardiac rate and rhythm   Administer antiarrhythmia medication and electrolyte replacement as ordered   Assess for signs of decreased cardiac output  1/5/2025 1806 by Ada Pierce RN  Outcome: Progressing  Flowsheets (Taken 1/5/2025 1806)  Absence of cardiac dysrhythmias or at baseline:   Monitor cardiac rate and rhythm   Administer antiarrhythmia medication and electrolyte replacement as

## 2025-01-07 NOTE — DISCHARGE SUMMARY
Resident Discharge Summary (Hospitalist)      Patient: Trav Jennings 59 y.o. male  : 1965  MRN: [id number]   Account: [id number]   Patient's PCP: Radha Hou APRN - CNP    Admit Date: 2025   Discharge Date: 2025      Admitting Physician: Nish Riley MD  Discharge Physician: Julio Cesar Grande MD       Discharge Diagnoses:  Hypertensive urgency 2/2 cocaine intoxication: Complicated by resistant hypertension and ESRD.  Patient endorses using approximately 2 to 3 g cocaine per day, developed chest pain which resolved after treatment described in HPI.  Presented with , after several hours on Cardizem drip SBP dropped to 180s to 190s.  Home meds include: Amlodipine, Toprol, clonidine, Imdur, hydralazine and doxazosin.  Initially Tx with cardizem drip, transitioned to oral labetalol, resumed home meds in stepwise fashion  Reduced amlodipine to 5mg, Dc'd cardura .  Thoracic aortic aneurysm with type B aortic dissection: Patient has prior history thoracic aortic aneurysm s/p stent graft placement extending from proximal transverse aortic arch to distal thoracic aorta.  CTA imaging showing eccentric mural thrombus along distal thoracic aorta with descending aortic dissection flap seen at hiatus.  Cardiothoracic surgery consulted and rec OP f/u  ESRD on HD: Patient on 3 times weekly dialysis, has AV fistula.  Reportedly has been noncompliant with dialysis in the past. Nephrology consulted for HD.  Patient tolerated dialysis well during admission.  Gluteal wounds: patient c/o wounds on gluteal area bilaterally, associated with minor bloody discharge, wound care order placed, will order topical ABX, rec PCP f/u.   Hyperkalemia: Point-of-care K was reportedly 8.3 upon admission, treated with calcium gluconate, albuterol.  BMP reported K 5.7.  Nephrology consulted and followed, patient tolerated dialysis well during admission.  Paroxysmal A-fib: Previously diagnosed, placed on anticoagulation

## 2025-01-07 NOTE — FLOWSHEET NOTE
Jose 4 hour treatment complete. Removed 1.5 liters of fluid as per order. Tolerated fluid removal well. Pressure held to needle sites times ten minutes each. Dressing clean, dry and intact. Report given to primary RN. Treatment record printed for scanning into EMR.   01/07/25 0730 01/07/25 1155   Vital Signs   BP (!) 106/48 (!) 141/58   Temp 97.9 °F (36.6 °C) 97.8 °F (36.6 °C)   Pulse 77 74   Respirations 17 14   SpO2 92 % 95 %   Weight - Scale 95.5 kg (210 lb 8.6 oz) 94 kg (207 lb 3.7 oz)   Weight Method Bed scale Bed scale   Percent Weight Change -4.5 -1.57   Post-Hemodialysis Assessment   Post-Treatment Procedures  --  Blood returned;Access bleeding time < 10 minutes   Machine Disinfection Process  --  Acid/Vinegar Clean;Heat Disinfect;Exterior Machine Disinfection   Blood Volume Processed (Liters)  --  114.9 L   Dialyzer Clearance  --  Lightly streaked   Duration of Treatment (minutes)  --  240 minutes   Hemodialysis Intake (ml)  --  400 ml   Hemodialysis Output (ml)  --  1900 ml   NET Removed (ml)  --  1500   Tolerated Treatment  --  Good

## 2025-01-14 ENCOUNTER — TELEPHONE (OUTPATIENT)
Age: 60
End: 2025-01-14

## 2025-01-14 NOTE — TELEPHONE ENCOUNTER
Pt no showed again to initial visit.  Pt has not established care with this clinic.  No showed to initial appt on 12/30, 1/3, 1/10.  Called pt to offer to reschedule, he states he is at another appt and needs to call us back.  Advised patient that we can reschedule him one final time and he must keep appt.  Patient voiced understanding, he states he will call us back to schedule.

## 2025-01-14 NOTE — TELEPHONE ENCOUNTER
Pt called back and scheduled initial visit for 1/17.  Again advised patient that if he no shows to appt we will be unable to manage his care.

## 2025-01-17 ENCOUNTER — HOSPITAL ENCOUNTER (OUTPATIENT)
Age: 60
Discharge: HOME | End: 2025-01-17
Payer: MEDICARE

## 2025-01-17 ENCOUNTER — ANTI-COAG VISIT (OUTPATIENT)
Age: 60
End: 2025-01-17
Payer: MEDICARE

## 2025-01-17 DIAGNOSIS — I48.92 ATRIAL FLUTTER, UNSPECIFIED TYPE (HCC): Primary | ICD-10-CM

## 2025-01-17 DIAGNOSIS — Z79.01 ANTICOAGULATED ON COUMADIN: ICD-10-CM

## 2025-01-17 DIAGNOSIS — Z51.81 ENCOUNTER FOR THERAPEUTIC DRUG MONITORING: ICD-10-CM

## 2025-01-17 LAB — POC INR: 1.1 (ref 0.8–1.2)

## 2025-01-17 PROCEDURE — 99212 OFFICE O/P EST SF 10 MIN: CPT

## 2025-01-17 PROCEDURE — 86480 TB TEST CELL IMMUN MEASURE: CPT

## 2025-01-17 PROCEDURE — 85610 PROTHROMBIN TIME: CPT

## 2025-01-17 RX ORDER — CEPHALEXIN 500 MG/1
500 CAPSULE ORAL 3 TIMES DAILY
COMMUNITY
Start: 2025-01-15

## 2025-01-17 RX ORDER — CLINDAMYCIN PHOSPHATE 10 MG/G
GEL TOPICAL 2 TIMES DAILY
COMMUNITY
Start: 2025-01-14

## 2025-01-17 RX ORDER — ONDANSETRON 4 MG/1
4 TABLET, FILM COATED ORAL EVERY 8 HOURS PRN
COMMUNITY
Start: 2024-05-03

## 2025-01-17 RX ORDER — SUCROFERRIC OXYHYDROXIDE 500 MG/1
500 TABLET, CHEWABLE ORAL
COMMUNITY
Start: 2024-11-27

## 2025-01-17 RX ORDER — OMEGA-3 FATTY ACIDS/FISH OIL 300-1000MG
1 CAPSULE ORAL DAILY
COMMUNITY

## 2025-01-17 RX ORDER — FLUTICASONE PROPIONATE 50 MCG
1 SPRAY, SUSPENSION (ML) NASAL DAILY
COMMUNITY
Start: 2024-11-18

## 2025-01-17 RX ORDER — HYDROXYZINE PAMOATE 50 MG/1
50 CAPSULE ORAL 3 TIMES DAILY PRN
COMMUNITY
Start: 2024-11-18

## 2025-01-17 NOTE — PROGRESS NOTES
Medication Management Clinic  [facility]  AnticoagulationClinic  [phone] (phone)  [phone] (fax)     Patient presents to the Anticoagulation clinic today for assessment and initial dosing of warfarin.  Patient has a diagnosis of Aflutter and has been placed on Coumadin with a goal INR 2-3.  Pt started Coumadin 3.75 mg once daily.     Trav Jennings was given full education today in the clinic including written materials, supplemental oral instructions, and all questions were answered.  Specifically, Trav was instructed to notfiy the Anticoagulation clinic immediately if there is any unusual bleeding, such as throwing or coughing up blood, bleeding from the nose, mouth, ears, or pink-red tinge in the urine or if the stools contain bright red blood or are black and tarry.       In addition, Trav was informed to notify the clinic about any and all medicine changes, including prescriptions, “over-the-counter” or nonprescription medicines, vitamins, herbs, supplements, creams, rubs, eye or ear drops, and injections, whether to be used short- or long-term, within 24 hours.  The patient was also instructed to let all other physicians and pharmacists know that warfarin was started as a double-check against drug interactions.  The patient was further instructed on the effects of vitamin K containing foods on warfarin and the importance of consistency was stressed.  Trav was also advised to avoid large amounts of alcohol, grapefruit juice or cranberry juice while on warfarin.      HPI:    Medication changes: added Omega 3 to list.   Tablet strength per patient: 7.5 mg  Patient reported dosing regimen over last 1 week: 7.5 mg on 1/9 then 3.75 mg 1/10-1/17  Missed/Extra doses in the last 1-2 weeks: No  Any problems with bleeding/bruising? No  Any recent falls? No   Any signs or symptoms of DVT/PE or stroke? No  Alcohol use: no  Tobacco use: 1-2 cigarettes/week   Diet changes as follows: No changes   Green leafy

## 2025-01-20 LAB
GAMMA INTERFERON BACKGROUND BLD IA-ACNC: 0.01 IU/ML
M TB IFN-G BLD-IMP: NEGATIVE
M TB IFN-G CD4+ BCKGRND COR BLD-ACNC: 0.01 IU/ML
M TB IFN-G CD4+CD8+ BCKGRND COR BLD-ACNC: 0 IU/ML
MITOGEN IGNF BCKGRD COR BLD-ACNC: 9.99 IU/ML

## 2025-01-21 ENCOUNTER — TELEPHONE (OUTPATIENT)
Age: 60
End: 2025-01-21

## 2025-01-21 ENCOUNTER — OFFICE VISIT (OUTPATIENT)
Age: 60
End: 2025-01-21
Payer: MEDICARE

## 2025-01-21 VITALS
WEIGHT: 218.2 LBS | DIASTOLIC BLOOD PRESSURE: 74 MMHG | HEIGHT: 75 IN | SYSTOLIC BLOOD PRESSURE: 154 MMHG | BODY MASS INDEX: 27.13 KG/M2 | HEART RATE: 72 BPM

## 2025-01-21 DIAGNOSIS — N18.6 END STAGE RENAL DISEASE (HCC): Primary | ICD-10-CM

## 2025-01-21 PROCEDURE — G8427 DOCREV CUR MEDS BY ELIG CLIN: HCPCS | Performed by: SURGERY

## 2025-01-21 PROCEDURE — 3017F COLORECTAL CA SCREEN DOC REV: CPT | Performed by: SURGERY

## 2025-01-21 PROCEDURE — 3078F DIAST BP <80 MM HG: CPT | Performed by: SURGERY

## 2025-01-21 PROCEDURE — 4004F PT TOBACCO SCREEN RCVD TLK: CPT | Performed by: SURGERY

## 2025-01-21 PROCEDURE — 99213 OFFICE O/P EST LOW 20 MIN: CPT | Performed by: SURGERY

## 2025-01-21 PROCEDURE — 1111F DSCHRG MED/CURRENT MED MERGE: CPT | Performed by: SURGERY

## 2025-01-21 PROCEDURE — 3077F SYST BP >= 140 MM HG: CPT | Performed by: SURGERY

## 2025-01-21 PROCEDURE — G8419 CALC BMI OUT NRM PARAM NOF/U: HCPCS | Performed by: SURGERY

## 2025-01-21 NOTE — H&P (VIEW-ONLY)
[facility] [place] [facility]  [facility] VASCULAR SURGERY  [address], [address]  [place] [address]  Dept: [phone]  Loc: [phone]     Patient: Trav Jennings  : 1965  MRN: [id number]  DOS: 2025    Referring provider:  Jamal Ayon MD         HPI:  Trav Jennings is a 60 y.o. male who comes to the office for the first time regarding his dialysis access fistula.  He has a brachiocephalic fistula on the left side.  It is extremely aneurysmal with aneurysms likely measuring 5, 6 or even 7 cm in their greatest diameter.  He has not had recent fistulography.  He is still dialyzing through the fistula.  It was constructed in .  He also has an aortic dissection which was repaired with an aortic endograft in the proximal descending thoracic aorta.  This was done by covering the left subclavian artery.  He has a carotid to subclavian bypass to support his left upper extremity fistula.  The aortic diameters are not significantly aneurysmal at this point.  Past Medical History:   Diagnosis Date    AAA (abdominal aortic aneurysm) ([facility])     NURIS (acute kidney injury) ([facility]) 2015    Anemia associated with chronic renal failure     Arthritis     stated in hands    Cocaine abuse ([facility]) 5/10/2014    Depression     Diabetes mellitus ([facility])     pt states he no longer has diabetes he has lost alot of davion.     FSGS (focal segmental glomerulosclerosis) 2013    Hemodialysis patient ([facility]) 10/17/2016    on hemodialysis with [facility]    Hemorrhoids 2012    History of blood transfusion     Hyperlipidemia     Hyperphosphatemia 2016    Hypertension     Left renal artery stenosis ([facility]) 2014    Monoclonal (M) protein disease, multiple 'M' protein     Nicotine dependence 2014    Noncompliance     Pneumonia     Psychiatric problem     PTSD (post-traumatic stress disorder)     Pt vet from [facility]    Secondary

## 2025-01-21 NOTE — PROGRESS NOTES
[facility] [place] [facility]  [facility] VASCULAR SURGERY  [address], [address]  [place] [address]  Dept: [phone]  Loc: [phone]     Patient: Trav Jennings  : 1965  MRN: [id number]  DOS: 2025    Referring provider:  Jamal Ayon MD         HPI:  Trav Jennings is a 60 y.o. male who comes to the office for the first time regarding his dialysis access fistula.  He has a brachiocephalic fistula on the left side.  It is extremely aneurysmal with aneurysms likely measuring 5, 6 or even 7 cm in their greatest diameter.  He has not had recent fistulography.  He is still dialyzing through the fistula.  It was constructed in .  He also has an aortic dissection which was repaired with an aortic endograft in the proximal descending thoracic aorta.  This was done by covering the left subclavian artery.  He has a carotid to subclavian bypass to support his left upper extremity fistula.  The aortic diameters are not significantly aneurysmal at this point.  Past Medical History:   Diagnosis Date    AAA (abdominal aortic aneurysm) ([facility])     NURIS (acute kidney injury) ([facility]) 2015    Anemia associated with chronic renal failure     Arthritis     stated in hands    Cocaine abuse ([facility]) 5/10/2014    Depression     Diabetes mellitus ([facility])     pt states he no longer has diabetes he has lost alot of davion.     FSGS (focal segmental glomerulosclerosis) 2013    Hemodialysis patient ([facility]) 10/17/2016    on hemodialysis with [facility]    Hemorrhoids 2012    History of blood transfusion     Hyperlipidemia     Hyperphosphatemia 2016    Hypertension     Left renal artery stenosis ([facility]) 2014    Monoclonal (M) protein disease, multiple 'M' protein     Nicotine dependence 2014    Noncompliance     Pneumonia     Psychiatric problem     PTSD (post-traumatic stress disorder)     Pt vet from [facility]    Secondary

## 2025-01-21 NOTE — TELEPHONE ENCOUNTER
Trav is scheduled for LT UE Fisulagram with  Dr. Garcia on 1/23/2025 at 9:30 am. Pt agreed to date/time.     Surgery instructions are as follows:  - Arrive to Same Day Surgery at [facility] at 7:30 am  - NPO after midnight the night before surgery  - No medication holds per Dr. Garcia  - You must have a  day of surgery        All instructions discussed with pt, he verbalized understanding. he  denied questions or concerns at this time.     Primary insurance is [facility]. Will obtain prior authorization as necessary.

## 2025-01-21 NOTE — PATIENT INSTRUCTIONS
If you receive a survey asking about your care experience, please respond. Your answers will help ensure you receive high-quality care at this office. Thank you!    Your Medical Assistant today: Marissa SMITH  Thank you for coming to our office! It was a pleasure to serve you.

## 2025-01-22 ENCOUNTER — ANTI-COAG VISIT (OUTPATIENT)
Age: 60
End: 2025-01-22
Payer: MEDICARE

## 2025-01-22 ENCOUNTER — TELEPHONE (OUTPATIENT)
Age: 60
End: 2025-01-22

## 2025-01-22 DIAGNOSIS — I48.92 ATRIAL FLUTTER, UNSPECIFIED TYPE (HCC): Primary | ICD-10-CM

## 2025-01-22 DIAGNOSIS — Z79.01 ANTICOAGULATED ON COUMADIN: ICD-10-CM

## 2025-01-22 DIAGNOSIS — Z51.81 ENCOUNTER FOR THERAPEUTIC DRUG MONITORING: ICD-10-CM

## 2025-01-22 LAB — POC INR: 1.2 (ref 0.8–1.2)

## 2025-01-22 PROCEDURE — 85610 PROTHROMBIN TIME: CPT | Performed by: PHARMACIST

## 2025-01-22 PROCEDURE — 99212 OFFICE O/P EST SF 10 MIN: CPT | Performed by: PHARMACIST

## 2025-01-22 RX ORDER — ATORVASTATIN CALCIUM 80 MG/1
40 TABLET, FILM COATED ORAL DAILY
COMMUNITY

## 2025-01-22 NOTE — TELEPHONE ENCOUNTER
Bonnie from Coumadin clinic called stating the patient is on coumadin and the current level is 1.20 today.  They wanted to make sure that this was okay for the patient to proceed with the fistulagram he is having on 01/23/2025.  She can be contacted at extension [phone].  Thank you.

## 2025-01-22 NOTE — PROGRESS NOTES
Medication Management Clinic  [facility]  Anticoagulation Clinic  [phone] (phone)  [phone] (fax)    Mr. Trav Jennings is a 60 y.o.  male with history of Afib who presents today for anticoagulation monitoring and adjustment.    Patient verifies current dosing regimen and tablet strength.  Possibly missed 1/19 & 1/20, has #18 tablets in Rx bottle with him today. Explained the impact of missed doses on INR.  Pt states he takes a lot of medications, denies using a pillbox.  Encouraged pillbox for at least his coumadin.  Instructed patient to take coumadin in the evening, which he is willing to do.     Patient denies s/s bleeding/bruising/swelling/SOB  No blood in urine or stool.  No dietary changes.  No changes in medication/OTC agents/Herbals.  No change in alcohol use or tobacco use.  No change in activity level.  Patient denies falls.  No vomiting/diarrhea or acute illness.   Fistulagram 1/23 with Dr Garcia. Pt denies being instructed to hold Coumadin, has paperwork with coumadin section marked out.  LM with Heart Specialist to inform of INR = 1.2 today and plan to boost dose today.      Assessment:   Lab Results   Component Value Date    INR 1.20 01/22/2025    INR 1.10 01/17/2025    INR 1.15 (H) 01/07/2025     INR therapeutic   Recent Labs     01/22/25  0749   INR 1.20     Plan:  Coumadin 12.5 mg X 2 then continue Coumadin 7.5 mg daily.  Recheck INR in 1 week(s).  Patient reminded to call the Anticoagulation Clinic with any signs or symptoms of bleeding or with any medication changes.  Patient given instructions utilizing the teach back method.    After visit summary printed and reviewed with patient.      Discharged ambulatory in no apparent distress.    Arlene Hou PharmD 1/22/2025 8:21 AM    For Pharmacy Admin Tracking Only    Intervention Detail: Dose Adjustment: 1, reason: Therapy Optimization  Total # of Interventions Recommended: 1  Total # of Interventions Accepted: 1  Time Spent

## 2025-01-23 ENCOUNTER — HOSPITAL ENCOUNTER (OUTPATIENT)
Dept: INTERVENTIONAL RADIOLOGY/VASCULAR | Age: 60
Discharge: HOME | End: 2025-01-23
Attending: SURGERY | Admitting: SURGERY
Payer: MEDICARE

## 2025-01-23 ENCOUNTER — PREP FOR PROCEDURE (OUTPATIENT)
Age: 60
End: 2025-01-23

## 2025-01-23 VITALS
WEIGHT: 211.64 LBS | BODY MASS INDEX: 26.32 KG/M2 | TEMPERATURE: 98 F | RESPIRATION RATE: 19 BRPM | DIASTOLIC BLOOD PRESSURE: 60 MMHG | SYSTOLIC BLOOD PRESSURE: 166 MMHG | OXYGEN SATURATION: 95 % | HEIGHT: 75 IN | HEART RATE: 81 BPM

## 2025-01-23 DIAGNOSIS — N18.6 END STAGE RENAL DISEASE (HCC): ICD-10-CM

## 2025-01-23 PROBLEM — T82.590A MECHANICAL COMPLICATION OF ARTERIOVENOUS FISTULA SURGICALLY CREATED (HCC): Status: ACTIVE | Noted: 2025-01-23

## 2025-01-23 PROCEDURE — 2709999900 IR FISTULAGRAM

## 2025-01-23 PROCEDURE — 36901 INTRO CATH DIALYSIS CIRCUIT: CPT

## 2025-01-23 PROCEDURE — 6360000004 HC RX CONTRAST MEDICATION: Performed by: SURGERY

## 2025-01-23 RX ORDER — IOPAMIDOL 510 MG/ML
INJECTION, SOLUTION INTRAVASCULAR PRN
Status: COMPLETED | OUTPATIENT
Start: 2025-01-23 | End: 2025-01-23

## 2025-01-23 RX ADMIN — IOPAMIDOL 64 ML: 510 INJECTION, SOLUTION INTRAVASCULAR at 09:42

## 2025-01-23 NOTE — FLOWSHEET NOTE
Discharged per wc per transport team with personal belongings   Has cell phone, personal belongings with him  Inpt dialysis RN notified that suture will need to be removed tomorrow   Pt has dialysis tomorrow and Saturday

## 2025-01-23 NOTE — PROGRESS NOTES
0905 Patient received in IR for fistulogram.   0911 This procedure has been fully reviewed with the patient and written informed consent has been obtained.  0929 Procedure started with Dr. Garcia. No sedation per Dr Garcia.   0930 Access to fistula.   0940 Procedure completed; patient tolerated well. 2-0 Proline suture placed; Dr Garcia states dialysis can remove suture at next treatment.   0950 Dressing to left arm; no bleeding noted.  0955 Patient on bed; comfort ensured.  0957 Patient taken to 2E via bed. Pt alert and oriented x3; follows commands. Skin pink, warm, and dry. Respirations easy, regular, and nonlabored. Opsite to left arm dry and intact.

## 2025-01-23 NOTE — PROGRESS NOTES
0800  Pt admitted to  2E08 ambulatory for Fistulagram .  Pt NPO. Patient unaccompanied, wife is at home and aware pt is here today.   Vital signs obtained.  Assessment and data collection initiated.   Oriented to room.   Policies and procedures for 2E explained   All questions answered with no further questions at this time.   Fall prevention and safety precautions discussed with patient.   Dr Garcia on unit, he states \"no lab work needs drawn or IV started for procedure\"  Dr Garcia is aware pt is on Coumadin.    Reviewed medication list with pt, Pt unsure if list of medications is accurate, no changes made to list    0900  to procedure per bed

## 2025-01-23 NOTE — OP NOTE
Operative Note      Patient: Trav Jennings  YOB: 1965  MRN: [id number]    Date of Procedure: 1/23/2025    Preop diagnosis:  Malfunctioning left upper extremity brachiocephalic fistula    Post-Op Diagnosis: Same       Procedure:  1.  Left upper extremity fistulogram.    Surgeon(s):  Tony Garcia MD    Assistant:   * No surgical staff found *    Anesthesia: Local only    Estimated Blood Loss (mL): Minimal    Complications: None    Specimens:   * No specimens in log *    Implants:  * No implants in log *      Drains: * No LDAs found *    Findings:  Infection Present At Time Of Surgery (PATOS) (choose all levels that have infection present):  No infection present  Other Findings: There are significant aneurysmal segments of an extremely tortuous cephalic vein fistula.  I think that a reconstruction of this fistula will be challenging.  There may be a central vein stenosis.  The vein is tortuous all the way up to its insertion on the subclavian vein.  The best option may be a brachioaxillary graft.  We will attempt a fistula revision versus AV graft in several weeks.    Detailed Description of Procedure:   The patient was brought to the operating room and identified appropriately.  The left upper extremity was prepped and draped in a sterile fashion.  Timeout was held before lidocaine without epinephrine was infiltrated into the skin and subcutaneous tissue overlying the arterial side of the access.  The access was then entered with a micropuncture needle through which modified Seldinger technique was used to establish a 4 Swedish micropuncture sheath.  This sheath was placed to an extension tubing through which fistulography was obtained including the arterial anastomosis, the entirety of the fistula and all of the outflow veins including the innominate vein and superior vena cava.  The micropuncture sheath was then removed and a U-stitch was placed around its entry site for hemostasis.  Pressure

## 2025-01-23 NOTE — FLOWSHEET NOTE
01/23/25 1045   AVS Reviewed   AVS & discharge instructions reviewed with patient and/or representative? Yes   Reviewed instructions with Patient   Level of Understanding Questions answered;Teach back completed;Verbalized understanding

## 2025-01-23 NOTE — INTERVAL H&P NOTE
Update History & Physical    The patient's History and Physical of January 21,  was reviewed with the patient and I examined the patient. There was no change. The surgical site was confirmed by the patient and me.     Plan: The risks, benefits, expected outcome, and alternative to the recommended procedure have been discussed with the patient. Patient understands and wants to proceed with the procedure.     Electronically signed by Tony Garcia MD on 1/23/2025 at 9:01 AM

## 2025-01-24 ENCOUNTER — TELEPHONE (OUTPATIENT)
Age: 60
End: 2025-01-24

## 2025-01-24 NOTE — TELEPHONE ENCOUNTER
Trav is scheduled for left upper extremity AV fistula revision versus thick walled AV graft  with Dr. Garcia on 2/5/2025 at 1:00 pm. Pt agreed to date/time.     Surgery instructions are as follows:  - Arrive to Same Day Surgery at [facility] at 11:00 am  - NPO after midnight the night before surgery  - You must hold your Coumadin for 5 days prior to surgery  - You must have a  day of surgery     No  PAT per Dr. Garcia      All instructions discussed with pt, he verbalized understanding. he  denied questions or concerns at this time.     Primary insurance is [facility] Medicare. Will obtain prior authorization as necessary.

## 2025-01-24 NOTE — TELEPHONE ENCOUNTER
Surgery Scheduling Form   [facility] [address] [place], [place] [address]    Phone * [phone]   Surgical Scheduling Direct line Phone * [phone]  Fax * [phone]      Trav JIGAR Dick      1965    male    [address] [address]   Marital Status:    M     Home Phone: [phone]   Cell Phone:   Telephone Information:   Mobile [phone]              Surgeon:Dr. Garcia  Surgery Date:2/5/2025   Time: 1:00 p     Procedure: left upper extremity AV fistula revision versus thick walled AV graft  Outpatient   Diagnosis: ESRD    CPT Codes: 22424    Latex Allergy:   no Cardiac Device:  no    Anesthesia Type: General    Case Location:  OR     Preadmission Testing: Phone Call      Need Preop Cardiac Clearance:   no    Does patient have Cardiologist/physician?       Surgery Conformation #:  ______________________________________________    : __Melodie López [facility]__ Date:_1/24/2025___________________

## 2025-01-27 ENCOUNTER — ANTI-COAG VISIT (OUTPATIENT)
Age: 60
End: 2025-01-27
Payer: MEDICARE

## 2025-01-27 ENCOUNTER — OFFICE VISIT (OUTPATIENT)
Dept: CARDIOLOGY CLINIC | Age: 60
End: 2025-01-27
Payer: MEDICARE

## 2025-01-27 VITALS
DIASTOLIC BLOOD PRESSURE: 92 MMHG | WEIGHT: 222.2 LBS | HEIGHT: 75 IN | BODY MASS INDEX: 27.63 KG/M2 | SYSTOLIC BLOOD PRESSURE: 200 MMHG | OXYGEN SATURATION: 90 % | HEART RATE: 86 BPM

## 2025-01-27 DIAGNOSIS — I10 PRIMARY HYPERTENSION: ICD-10-CM

## 2025-01-27 DIAGNOSIS — I48.92 PAROXYSMAL ATRIAL FLUTTER (HCC): Primary | ICD-10-CM

## 2025-01-27 DIAGNOSIS — N18.6 END STAGE RENAL DISEASE (HCC): ICD-10-CM

## 2025-01-27 DIAGNOSIS — Z51.81 ENCOUNTER FOR THERAPEUTIC DRUG MONITORING: Primary | ICD-10-CM

## 2025-01-27 DIAGNOSIS — Z79.01 ANTICOAGULATED ON COUMADIN: ICD-10-CM

## 2025-01-27 LAB — POC INR: 1.5 (ref 0.8–1.2)

## 2025-01-27 PROCEDURE — G8427 DOCREV CUR MEDS BY ELIG CLIN: HCPCS | Performed by: PHYSICIAN ASSISTANT

## 2025-01-27 PROCEDURE — 3017F COLORECTAL CA SCREEN DOC REV: CPT | Performed by: PHYSICIAN ASSISTANT

## 2025-01-27 PROCEDURE — 99214 OFFICE O/P EST MOD 30 MIN: CPT | Performed by: PHYSICIAN ASSISTANT

## 2025-01-27 PROCEDURE — G8419 CALC BMI OUT NRM PARAM NOF/U: HCPCS | Performed by: PHYSICIAN ASSISTANT

## 2025-01-27 PROCEDURE — 85610 PROTHROMBIN TIME: CPT | Performed by: PHARMACIST

## 2025-01-27 PROCEDURE — 3080F DIAST BP >= 90 MM HG: CPT | Performed by: PHYSICIAN ASSISTANT

## 2025-01-27 PROCEDURE — 99212 OFFICE O/P EST SF 10 MIN: CPT | Performed by: PHARMACIST

## 2025-01-27 PROCEDURE — 3077F SYST BP >= 140 MM HG: CPT | Performed by: PHYSICIAN ASSISTANT

## 2025-01-27 PROCEDURE — 1111F DSCHRG MED/CURRENT MED MERGE: CPT | Performed by: PHYSICIAN ASSISTANT

## 2025-01-27 PROCEDURE — 4004F PT TOBACCO SCREEN RCVD TLK: CPT | Performed by: PHYSICIAN ASSISTANT

## 2025-01-27 NOTE — PROGRESS NOTES
Medication Management Clinic  [facility]  Anticoagulation Clinic  [phone] (phone)  [phone] (fax)    Mr. Trav Jennings is a 60 y.o.  male with history of Aflutter who presents today for anticoagulation monitoring and adjustment.    Patient verifies current dosing regimen and tablet strength.  No extra doses. Missed a couple doses  (11.25mg dose),  (7.5mg dose) at least he thinks. Reminded him to set up a pill box to help him remember.  Patient denies s/s bleeding/bruising/swelling/SOB  No blood in urine or stool.  No dietary changes.  No changes in medication/OTC agents/Herbals.  No change in alcohol use or tobacco use.  No change in activity level.  Patient denies falls.  No vomiting/diarrhea or acute illness.   Procedures scheduled:  25 Left upper extremity AV fistula revision vs thick walled AV graft   Instructed to Hold Coumadin for 5 days per TC 25.     Assessment:   Lab Results   Component Value Date    INR 1.50 (H) 2025    INR 1.20 2025    INR 1.10 2025     INR subtherapeutic   Recent Labs     25  1009   INR 1.50*     Plan:  Patient has already taken Coumadin 7.5mg this AM. Instructed to take an additional 1/2 tablet when he gets home 3.75mg for a total of 11.25mg today, Coumadin 11.25mg tomorrow then Coumadin 7.5mg daily. Start Coumadin HOLD -. Procedure is on . If ok with physician take Coumadin 11.25mg x2 days (, ) then Continue Coumadin 7.5mg daily.  Recheck INR in 1 week following procedure.  No lovenox due to hemodialysis patient. Patient reminded to call the Anticoagulation Clinic with any signs or symptoms of bleeding or with any medication changes.  Patient given instructions utilizing the teach back method.    After visit summary printed and reviewed with patient.      Discharged ambulatory in no apparent distress.    For Pharmacy Admin Tracking Only    Intervention Detail: Adherence Monitorin and Dose Adjustment: 1,

## 2025-01-27 NOTE — PROGRESS NOTES
C/o sob  Denies cp, palpitations    Pt is having a fistula revision on left arm 2-5-25  Pt has not taken his BP medication this morning.

## 2025-01-27 NOTE — PROGRESS NOTES
[facility] [place] [facility]  [facility] CARDIOLOGY  [address]  [address]  [place] [address]  Dept: [phone]  Dept Fax: [phone]  Loc: [phone]    Chief Complaint   Patient presents with    Follow-Up from Hospital     [facility]       History of Present Illness  The patient is a 60-year-old male with a history of recent hospitalization for new atrial flutter, presenting for follow-up.    He reports an overall improvement in his condition, with no recurrence of atrial flutter or chest pain. He has not taken his blood pressure medication today but did so the previous night. His blood pressure readings have been consistently high for the past 40 years. He had a Coumadin appointment this morning prior to this visit.     He experiences shortness of breath, which he attributes to fluid overload from dialysis. His weight was recorded as 222 pounds this morning, exceeding his usual weight range of 196 to 194 pounds. He is scheduled for dialysis tomorrow.    MEDICATIONS  Amiodarone 10 mg once a day, amlodipine 5 mg a day, aspirin 81 mg daily, atorvastatin half a tablet once a day, clonidine 0.3 mg 3 times a day, hydralazine 100 mg 3 times a day, isosorbide 60 mg twice a day, labetalol 100 mg every 8 hours, Coumadin.             General:   No fever, no chills, No fatigue or weight loss  Pulmonary:    Intermittent SOB  Cardiac:    Denies recent chest pain   GI:     No nausea or vomiting, no abdominal pain  Neuro:    No dizziness or light headedness  Musculoskeletal:  No recent active issues  Extremities:   No edema, good peripheral pulses      Past Medical History:   Diagnosis Date    AAA (abdominal aortic aneurysm) ([facility])     NURIS (acute kidney injury) ([facility]) 9/24/2015    Anemia associated with chronic renal failure     Arthritis     stated in hands    Cocaine abuse ([facility]) 5/10/2014    Depression     Diabetes mellitus ([facility])     pt states he no longer has diabetes he has lost alot of

## 2025-01-27 NOTE — PATIENT INSTRUCTIONS
You may receive a survey regarding the care you received during your visit. We encourage you to complete and return your survey, as your input is valuable to us. We hope you will choose us in the future for your healthcare needs. Thank you!    Your Medical Assistant today: Nicole  Thank you for coming to our office! It was a pleasure to serve you.

## 2025-01-29 RX ORDER — SODIUM CHLORIDE 9 MG/ML
INJECTION, SOLUTION INTRAVENOUS PRN
Status: CANCELLED | OUTPATIENT
Start: 2025-01-29

## 2025-01-29 RX ORDER — SODIUM CHLORIDE 0.9 % (FLUSH) 0.9 %
5-40 SYRINGE (ML) INJECTION PRN
Status: CANCELLED | OUTPATIENT
Start: 2025-01-29

## 2025-01-29 RX ORDER — SODIUM CHLORIDE 0.9 % (FLUSH) 0.9 %
5-40 SYRINGE (ML) INJECTION EVERY 12 HOURS SCHEDULED
Status: CANCELLED | OUTPATIENT
Start: 2025-01-29

## 2025-01-30 ENCOUNTER — TELEPHONE (OUTPATIENT)
Age: 60
End: 2025-01-30

## 2025-01-30 NOTE — TELEPHONE ENCOUNTER
Receive a message back from Dr. Garcia stating that the stitch can be removed.  Called and notified Kelly at [facility].

## 2025-01-30 NOTE — TELEPHONE ENCOUNTER
Kelly from [facility] called stating the patient informed them that they could remove the stitch he has in his arm from his 01/23/2025 Fistulagram.  Kelly did not see an order for this and called to verify this could be removed.  I sent a message to Dr. Garcia regarding this matter.

## 2025-02-10 ENCOUNTER — TELEPHONE (OUTPATIENT)
Age: 60
End: 2025-02-10

## 2025-02-10 RX ORDER — WARFARIN SODIUM 7.5 MG/1
TABLET ORAL
Qty: 30 TABLET | Refills: 0 | Status: SHIPPED | OUTPATIENT
Start: 2025-02-10

## 2025-02-10 NOTE — TELEPHONE ENCOUNTER
Trav called and stated that his procedure for 2/5 has been cancelled due to a death in the family. It is not rescheduled until April. He has not been taking his coumadin for 1 week. He states he also needs a new refill sent in for his Coumadin. Coumadin 7.5mg tablets sent in to [facility] outpatient pharmacy. Instructed patient to take Coumadin 11.25mg daily x3 days then continue 7.5mg daily. He voiced understanding. Appointment for Wednesday 2/12 cancelled due to not taking his Coumadin and rescheduled for the following Wed 2/19. Patient has hemodialysis TuThSat and he has a family doctor appointment Monday the 17th. Patient voiced understanding.     Radha Lees, PharmD 2/10/2025 8:47 AM    For Pharmacy Admin Tracking Only    Intervention Detail: Dose Adjustment: 1, reason: Therapy Optimization and New Rx: 1, reason: Patient Preference  Total # of Interventions Recommended: 10  Total # of Interventions Accepted: 1  Time Spent (min): 10

## 2025-02-12 NOTE — PROGRESS NOTES
Pt seen  1. Fluid overload  2. Uncontrolled hbp  3. Sore throat   4.  Cough    Chest fairly clear    Heart systolic murmur URSB, ULSB    LEGS puffy No

## 2025-02-20 ENCOUNTER — TELEPHONE (OUTPATIENT)
Age: 60
End: 2025-02-20

## 2025-02-20 ENCOUNTER — PREP FOR PROCEDURE (OUTPATIENT)
Age: 60
End: 2025-02-20

## 2025-02-20 NOTE — TELEPHONE ENCOUNTER
Patient missed yesterday's Coumadin appointment.  He states he has not taken Coumadin in 7 days as he ran out and hasn't had a way to the pharmacy. He states he is getting the Coumadin picked up today.  Patient Instructed to take Coumadin 11.25mg daily x3 days then continue 7.5mg daily. He voiced understanding. Rescheduled next appointment to Wed 2/26/25.

## 2025-02-26 ENCOUNTER — TELEPHONE (OUTPATIENT)
Age: 60
End: 2025-02-26

## 2025-02-26 NOTE — TELEPHONE ENCOUNTER
Pt picked up Rx today and took 2 of the 7.5 mg tablets (totaling 15 mg dose) before calling this clinic.    Advised pt to take warfarin 11.25 mg on 2/27, 11.25 mg on 2/28, then resume 7.5 mg daily. INR rescheduled for 3/5/25.

## 2025-02-26 NOTE — TELEPHONE ENCOUNTER
TIM:  Patient No Showed for his appointment today.  Called to reschedule him and he states he still does not have his pills.  He tells me he has not taken Coumadin for probably 2 weeks.   Pharmacist talked to him on 2/20/25, regarding dosing of Coumadin by phone, but he said he still don't have his pills.  He did not reschedule appointment with me, (states no need to till he gets his pills and is taking them)   Hopefully he will get them picked up today.

## 2025-03-05 ENCOUNTER — HOSPITAL ENCOUNTER (EMERGENCY)
Age: 60
Discharge: HOME | DRG: 252 | End: 2025-03-05
Attending: STUDENT IN AN ORGANIZED HEALTH CARE EDUCATION/TRAINING PROGRAM
Payer: MEDICARE

## 2025-03-05 ENCOUNTER — APPOINTMENT (OUTPATIENT)
Dept: GENERAL RADIOLOGY | Age: 60
DRG: 252 | End: 2025-03-05
Payer: MEDICARE

## 2025-03-05 ENCOUNTER — TELEPHONE (OUTPATIENT)
Age: 60
End: 2025-03-05

## 2025-03-05 VITALS
SYSTOLIC BLOOD PRESSURE: 174 MMHG | RESPIRATION RATE: 18 BRPM | OXYGEN SATURATION: 100 % | DIASTOLIC BLOOD PRESSURE: 82 MMHG | BODY MASS INDEX: 27.98 KG/M2 | WEIGHT: 225 LBS | HEIGHT: 75 IN | TEMPERATURE: 98.5 F | HEART RATE: 74 BPM

## 2025-03-05 DIAGNOSIS — R07.89 ATYPICAL CHEST PAIN: ICD-10-CM

## 2025-03-05 DIAGNOSIS — R07.9 CHEST PAIN, UNSPECIFIED TYPE: Primary | ICD-10-CM

## 2025-03-05 LAB
ANION GAP SERPL CALC-SCNC: 14 MEQ/L (ref 8–16)
APTT PPP: 41.8 SECONDS (ref 22–38)
BASOPHILS ABSOLUTE: 0 THOU/MM3 (ref 0–0.1)
BASOPHILS NFR BLD AUTO: 0.4 %
BUN SERPL-MCNC: 54 MG/DL (ref 8–23)
CA-I BLD ISE-SCNC: 1.1 MMOL/L (ref 1.12–1.32)
CALCIUM SERPL-MCNC: 8.6 MG/DL (ref 8.6–10)
CHLORIDE SERPL-SCNC: 100 MEQ/L (ref 98–111)
CO2 SERPL-SCNC: 26 MEQ/L (ref 22–29)
CREAT SERPL-MCNC: 7.4 MG/DL (ref 0.7–1.2)
DEPRECATED RDW RBC AUTO: 52.9 FL (ref 35–45)
EKG ATRIAL RATE: 129 BPM
EKG ATRIAL RATE: 76 BPM
EKG P AXIS: 66 DEGREES
EKG P-R INTERVAL: 224 MS
EKG P-R INTERVAL: 96 MS
EKG Q-T INTERVAL: 370 MS
EKG Q-T INTERVAL: 460 MS
EKG QRS DURATION: 112 MS
EKG QRS DURATION: 122 MS
EKG QTC CALCULATION (BAZETT): 517 MS
EKG QTC CALCULATION (BAZETT): 542 MS
EKG R AXIS: -44 DEGREES
EKG R AXIS: -45 DEGREES
EKG T AXIS: 86 DEGREES
EKG T AXIS: 87 DEGREES
EKG VENTRICULAR RATE: 129 BPM
EKG VENTRICULAR RATE: 76 BPM
EOSINOPHIL NFR BLD AUTO: 2.8 %
EOSINOPHILS ABSOLUTE: 0.1 THOU/MM3 (ref 0–0.4)
ERYTHROCYTE [DISTWIDTH] IN BLOOD BY AUTOMATED COUNT: 14.2 % (ref 11.5–14.5)
FLUAV RNA RESP QL NAA+PROBE: NOT DETECTED
FLUBV RNA RESP QL NAA+PROBE: NOT DETECTED
GFR SERPL CREATININE-BSD FRML MDRD: 8 ML/MIN/1.73M2
GLUCOSE SERPL-MCNC: 125 MG/DL (ref 74–109)
HCT VFR BLD AUTO: 30.9 % (ref 42–52)
HGB BLD-MCNC: 9.7 GM/DL (ref 14–18)
IMM GRANULOCYTES # BLD AUTO: 0.02 THOU/MM3 (ref 0–0.07)
IMM GRANULOCYTES NFR BLD AUTO: 0.4 %
INR PPP: 1.95 (ref 0.85–1.13)
LYMPHOCYTES ABSOLUTE: 0.8 THOU/MM3 (ref 1–4.8)
LYMPHOCYTES NFR BLD AUTO: 15.8 %
MAGNESIUM SERPL-MCNC: 2.4 MG/DL (ref 1.6–2.6)
MCH RBC QN AUTO: 31.9 PG (ref 26–33)
MCHC RBC AUTO-ENTMCNC: 31.4 GM/DL (ref 32.2–35.5)
MCV RBC AUTO: 101.6 FL (ref 80–94)
MONOCYTES ABSOLUTE: 0.4 THOU/MM3 (ref 0.4–1.3)
MONOCYTES NFR BLD AUTO: 7.5 %
NEUTROPHILS ABSOLUTE: 3.7 THOU/MM3 (ref 1.8–7.7)
NEUTROPHILS NFR BLD AUTO: 73.1 %
NRBC BLD AUTO-RTO: 0 /100 WBC
NT-PROBNP SERPL IA-MCNC: ABNORMAL PG/ML (ref 0–124)
OSMOLALITY SERPL CALC.SUM OF ELEC: 295.6 MOSMOL/KG (ref 275–300)
PHOSPHATE SERPL-MCNC: 2.7 MG/DL (ref 2.5–4.5)
PLATELET # BLD AUTO: 102 THOU/MM3 (ref 130–400)
PMV BLD AUTO: 9.9 FL (ref 9.4–12.4)
POTASSIUM SERPL-SCNC: 3.9 MEQ/L (ref 3.5–5.2)
RBC # BLD AUTO: 3.04 MILL/MM3 (ref 4.7–6.1)
SARS-COV-2 RNA RESP QL NAA+PROBE: NOT DETECTED
SODIUM SERPL-SCNC: 140 MEQ/L (ref 135–145)
TROPONIN, HIGH SENSITIVITY: 115 NG/L (ref 0–12)
TROPONIN, HIGH SENSITIVITY: 116 NG/L (ref 0–12)
WBC # BLD AUTO: 5.1 THOU/MM3 (ref 4.8–10.8)

## 2025-03-05 PROCEDURE — 85610 PROTHROMBIN TIME: CPT

## 2025-03-05 PROCEDURE — 82330 ASSAY OF CALCIUM: CPT

## 2025-03-05 PROCEDURE — 83735 ASSAY OF MAGNESIUM: CPT

## 2025-03-05 PROCEDURE — 84484 ASSAY OF TROPONIN QUANT: CPT

## 2025-03-05 PROCEDURE — 71046 X-RAY EXAM CHEST 2 VIEWS: CPT

## 2025-03-05 PROCEDURE — 93005 ELECTROCARDIOGRAM TRACING: CPT | Performed by: STUDENT IN AN ORGANIZED HEALTH CARE EDUCATION/TRAINING PROGRAM

## 2025-03-05 PROCEDURE — 85025 COMPLETE CBC W/AUTO DIFF WBC: CPT

## 2025-03-05 PROCEDURE — 85730 THROMBOPLASTIN TIME PARTIAL: CPT

## 2025-03-05 PROCEDURE — 80048 BASIC METABOLIC PNL TOTAL CA: CPT

## 2025-03-05 PROCEDURE — 83880 ASSAY OF NATRIURETIC PEPTIDE: CPT

## 2025-03-05 PROCEDURE — 36415 COLL VENOUS BLD VENIPUNCTURE: CPT

## 2025-03-05 PROCEDURE — 87636 SARSCOV2 & INF A&B AMP PRB: CPT

## 2025-03-05 PROCEDURE — 84100 ASSAY OF PHOSPHORUS: CPT

## 2025-03-05 ASSESSMENT — PAIN DESCRIPTION - LOCATION: LOCATION: CHEST

## 2025-03-05 ASSESSMENT — HEART SCORE: ECG: NORMAL

## 2025-03-05 ASSESSMENT — PAIN - FUNCTIONAL ASSESSMENT
PAIN_FUNCTIONAL_ASSESSMENT: NONE - DENIES PAIN
PAIN_FUNCTIONAL_ASSESSMENT: 0-10
PAIN_FUNCTIONAL_ASSESSMENT: NONE - DENIES PAIN

## 2025-03-05 ASSESSMENT — PAIN SCALES - GENERAL: PAINLEVEL_OUTOF10: 2

## 2025-03-05 NOTE — ED TRIAGE NOTES
Pt presents to the ER from dialysis with c/o chest pain. Pt received 2 hours and 30 minutes of his treatment which usually lasts 4 hours. Pt is on 4L NC at baseline and does not believe he was on it during his treatment. Pt states his chest pain significantly decreased when EMS placed him on their O2. Pt arrives with dialysis fistula accessed. Pt is alert and oriented, respirations even and unlabored. VSS. EKG completed.

## 2025-03-05 NOTE — TELEPHONE ENCOUNTER
Patient called and cancelled his appointment for today 3/5/25.  Patient was last seen in this clinic 1/27/25.  Patient cancelled 2/12 and 3/5/25.  No showed 2/19 and 2/26/25.   Rescheduled patient for Friday 3/7/25 and explained that he has to keep this appointment or he may be discharged from the clinic.   Patient states he understands.

## 2025-03-06 ENCOUNTER — HOSPITAL ENCOUNTER (INPATIENT)
Age: 60
LOS: 5 days | Discharge: HOME | DRG: 252 | End: 2025-03-11
Attending: EMERGENCY MEDICINE
Payer: MEDICARE

## 2025-03-06 ENCOUNTER — ANESTHESIA (OUTPATIENT)
Dept: OPERATING ROOM | Age: 60
End: 2025-03-06
Payer: MEDICARE

## 2025-03-06 ENCOUNTER — ANESTHESIA EVENT (OUTPATIENT)
Dept: OPERATING ROOM | Age: 60
End: 2025-03-06
Payer: MEDICARE

## 2025-03-06 DIAGNOSIS — Z98.890 S/P ARTERIOVENOUS (AV) FISTULA REPAIR: Primary | ICD-10-CM

## 2025-03-06 DIAGNOSIS — T82.838A: ICD-10-CM

## 2025-03-06 DIAGNOSIS — Z86.79 S/P ARTERIOVENOUS (AV) FISTULA REPAIR: Primary | ICD-10-CM

## 2025-03-06 PROBLEM — R58 EXCESSIVE BLEEDING: Status: ACTIVE | Noted: 2025-03-06

## 2025-03-06 LAB
ABO GROUP BLD: NORMAL
ALBUMIN SERPL BCG-MCNC: 3.6 G/DL (ref 3.4–4.9)
ALBUMIN SERPL BCG-MCNC: 3.9 G/DL (ref 3.4–4.9)
ALP SERPL-CCNC: 105 U/L (ref 40–129)
ALP SERPL-CCNC: 87 U/L (ref 40–129)
ALT SERPL W/O P-5'-P-CCNC: 14 U/L (ref 10–50)
ALT SERPL W/O P-5'-P-CCNC: 9 U/L (ref 10–50)
ANION GAP SERPL CALC-SCNC: 12 MEQ/L (ref 8–16)
ANION GAP SERPL CALC-SCNC: 16 MEQ/L (ref 8–16)
APTT PPP: 36.5 SECONDS (ref 22–38)
AST SERPL-CCNC: 17 U/L (ref 10–50)
AST SERPL-CCNC: 18 U/L (ref 10–50)
BASOPHILS ABSOLUTE: 0 THOU/MM3 (ref 0–0.1)
BASOPHILS ABSOLUTE: 0 THOU/MM3 (ref 0–0.1)
BASOPHILS NFR BLD AUTO: 0.4 %
BASOPHILS NFR BLD AUTO: 0.6 %
BILIRUB SERPL-MCNC: 0.2 MG/DL (ref 0.3–1.2)
BILIRUB SERPL-MCNC: 0.3 MG/DL (ref 0.3–1.2)
BUN SERPL-MCNC: 62 MG/DL (ref 8–23)
BUN SERPL-MCNC: 64 MG/DL (ref 8–23)
CALCIUM SERPL-MCNC: 7.2 MG/DL (ref 8.6–10)
CALCIUM SERPL-MCNC: 7.9 MG/DL (ref 8.6–10)
CFT BLD TEG: 2.3 MINUTES (ref 0.8–2.1)
CHLORIDE SERPL-SCNC: 100 MEQ/L (ref 98–111)
CHLORIDE SERPL-SCNC: 104 MEQ/L (ref 98–111)
CLOSURE TME BLD-IMP: NORMAL SECONDS
CLOSURE TME COLL+EPINEP BLD: 148 SECONDS (ref 68–175)
CLOT ANGLE BLD TEG: 17 MM (ref 15–32)
CLOT ANGLE BLD TEG: 69.7 DEG (ref 63–78)
CLOT INIT BLD TEG: 4.6 MINUTES (ref 4.6–9.1)
CLOT INIT P HPASE BLD TEG: 4.8 MINUTES (ref 4.3–8.3)
CO2 SERPL-SCNC: 25 MEQ/L (ref 22–29)
CO2 SERPL-SCNC: 26 MEQ/L (ref 22–29)
CREAT SERPL-MCNC: 8.4 MG/DL (ref 0.7–1.2)
CREAT SERPL-MCNC: 8.8 MG/DL (ref 0.7–1.2)
DEPRECATED RDW RBC AUTO: 53.1 FL (ref 35–45)
DEPRECATED RDW RBC AUTO: 53.7 FL (ref 35–45)
DEPRECATED RDW RBC AUTO: 56.6 FL (ref 35–45)
EKG ATRIAL RATE: 82 BPM
EKG P AXIS: 76 DEGREES
EKG P-R INTERVAL: 224 MS
EKG Q-T INTERVAL: 444 MS
EKG QRS DURATION: 106 MS
EKG QTC CALCULATION (BAZETT): 518 MS
EKG R AXIS: -49 DEGREES
EKG T AXIS: 91 DEGREES
EKG VENTRICULAR RATE: 82 BPM
EOSINOPHIL NFR BLD AUTO: 1 %
EOSINOPHIL NFR BLD AUTO: 2.7 %
EOSINOPHILS ABSOLUTE: 0 THOU/MM3 (ref 0–0.4)
EOSINOPHILS ABSOLUTE: 0.1 THOU/MM3 (ref 0–0.4)
ERYTHROCYTE [DISTWIDTH] IN BLOOD BY AUTOMATED COUNT: 13.9 % (ref 11.5–14.5)
ERYTHROCYTE [DISTWIDTH] IN BLOOD BY AUTOMATED COUNT: 14.1 % (ref 11.5–14.5)
ERYTHROCYTE [DISTWIDTH] IN BLOOD BY AUTOMATED COUNT: 14.2 % (ref 11.5–14.5)
FLUAV RNA RESP QL NAA+PROBE: NOT DETECTED
FLUBV RNA RESP QL NAA+PROBE: NOT DETECTED
FUNCT FIBRINOGEN LEVEL: 310.2 MG/DL (ref 278–581)
GFR SERPL CREATININE-BSD FRML MDRD: 6 ML/MIN/1.73M2
GFR SERPL CREATININE-BSD FRML MDRD: 7 ML/MIN/1.73M2
GLUCOSE BLD STRIP.AUTO-MCNC: 96 MG/DL (ref 70–108)
GLUCOSE SERPL-MCNC: 117 MG/DL (ref 74–109)
GLUCOSE SERPL-MCNC: 118 MG/DL (ref 74–109)
HBV SURFACE AG SERPL QL IA: NONREACTIVE
HCT VFR BLD AUTO: 23.1 % (ref 42–52)
HCT VFR BLD AUTO: 29.6 % (ref 42–52)
HCT VFR BLD AUTO: 31.1 % (ref 42–52)
HGB BLD-MCNC: 7.1 GM/DL (ref 14–18)
HGB BLD-MCNC: 9.3 GM/DL (ref 14–18)
HGB BLD-MCNC: 9.3 GM/DL (ref 14–18)
HYPOCHROMIA BLD QL SMEAR: PRESENT
IAT IGG-SP REAG SERPL QL: NORMAL
IMM GRANULOCYTES # BLD AUTO: 0.01 THOU/MM3 (ref 0–0.07)
IMM GRANULOCYTES # BLD AUTO: 0.02 THOU/MM3 (ref 0–0.07)
IMM GRANULOCYTES NFR BLD AUTO: 0.2 %
IMM GRANULOCYTES NFR BLD AUTO: 0.4 %
INR PPP: 1.8 (ref 0.85–1.13)
LYMPHOCYTES ABSOLUTE: 0.6 THOU/MM3 (ref 1–4.8)
LYMPHOCYTES ABSOLUTE: 0.9 THOU/MM3 (ref 1–4.8)
LYMPHOCYTES NFR BLD AUTO: 11.9 %
LYMPHOCYTES NFR BLD AUTO: 19.6 %
MAGNESIUM SERPL-MCNC: 2.4 MG/DL (ref 1.6–2.6)
MCF BLD TEG: 47.2 MM (ref 52–69)
MCF.EXTRINSIC BLD ROTEM: 47.7 MM (ref 52–70)
MCH RBC QN AUTO: 32.3 PG (ref 26–33)
MCH RBC QN AUTO: 32.4 PG (ref 26–33)
MCH RBC QN AUTO: 32.4 PG (ref 26–33)
MCHC RBC AUTO-ENTMCNC: 29.9 GM/DL (ref 32.2–35.5)
MCHC RBC AUTO-ENTMCNC: 30.7 GM/DL (ref 32.2–35.5)
MCHC RBC AUTO-ENTMCNC: 31.4 GM/DL (ref 32.2–35.5)
MCV RBC AUTO: 103.1 FL (ref 80–94)
MCV RBC AUTO: 105 FL (ref 80–94)
MCV RBC AUTO: 108.4 FL (ref 80–94)
MONOCYTES ABSOLUTE: 0.3 THOU/MM3 (ref 0.4–1.3)
MONOCYTES ABSOLUTE: 0.5 THOU/MM3 (ref 0.4–1.3)
MONOCYTES NFR BLD AUTO: 11.4 %
MONOCYTES NFR BLD AUTO: 6.3 %
NEUTROPHILS ABSOLUTE: 3.1 THOU/MM3 (ref 1.8–7.7)
NEUTROPHILS ABSOLUTE: 3.8 THOU/MM3 (ref 1.8–7.7)
NEUTROPHILS NFR BLD AUTO: 65.5 %
NEUTROPHILS NFR BLD AUTO: 80 %
NRBC BLD AUTO-RTO: 0 /100 WBC
NRBC BLD AUTO-RTO: 0 /100 WBC
OSMOLALITY SERPL CALC.SUM OF ELEC: 300 MOSMOL/KG (ref 275–300)
PLATELET # BLD AUTO: 109 THOU/MM3 (ref 130–400)
PLATELET # BLD AUTO: 81 THOU/MM3 (ref 130–400)
PLATELET # BLD AUTO: 93 THOU/MM3 (ref 130–400)
PLATELET BLD QL SMEAR: ABNORMAL
PMV BLD AUTO: 10.1 FL (ref 9.4–12.4)
PMV BLD AUTO: 9.9 FL (ref 9.4–12.4)
PMV BLD AUTO: 9.9 FL (ref 9.4–12.4)
POTASSIUM SERPL-SCNC: 5 MEQ/L (ref 3.5–5.2)
POTASSIUM SERPL-SCNC: 6.2 MEQ/L (ref 3.5–5.2)
PROT SERPL-MCNC: 6.1 G/DL (ref 6.4–8.3)
PROT SERPL-MCNC: 7.1 G/DL (ref 6.4–8.3)
RBC # BLD AUTO: 2.2 MILL/MM3 (ref 4.7–6.1)
RBC # BLD AUTO: 2.87 MILL/MM3 (ref 4.7–6.1)
RBC # BLD AUTO: 2.87 MILL/MM3 (ref 4.7–6.1)
RH BLD: NORMAL
SARS-COV-2 RNA RESP QL NAA+PROBE: NOT DETECTED
SCAN OF BLOOD SMEAR: NORMAL
SMUDGE CELLS BLD QL SMEAR: PRESENT
SODIUM SERPL-SCNC: 141 MEQ/L (ref 135–145)
SODIUM SERPL-SCNC: 142 MEQ/L (ref 135–145)
TOXIC GRANULES BLD QL SMEAR: PRESENT
VARIANT LYMPHS BLD QL SMEAR: ABNORMAL %
WBC # BLD AUTO: 4.8 THOU/MM3 (ref 4.8–10.8)
WBC # BLD AUTO: 4.8 THOU/MM3 (ref 4.8–10.8)
WBC # BLD AUTO: 4.9 THOU/MM3 (ref 4.8–10.8)

## 2025-03-06 PROCEDURE — 99285 EMERGENCY DEPT VISIT HI MDM: CPT

## 2025-03-06 PROCEDURE — 6360000002 HC RX W HCPCS

## 2025-03-06 PROCEDURE — 2580000003 HC RX 258: Performed by: NURSE ANESTHETIST, CERTIFIED REGISTERED

## 2025-03-06 PROCEDURE — 86900 BLOOD TYPING SEROLOGIC ABO: CPT

## 2025-03-06 PROCEDURE — 85027 COMPLETE CBC AUTOMATED: CPT

## 2025-03-06 PROCEDURE — 6370000000 HC RX 637 (ALT 250 FOR IP): Performed by: PHYSICIAN ASSISTANT

## 2025-03-06 PROCEDURE — 2500000003 HC RX 250 WO HCPCS: Performed by: SURGERY

## 2025-03-06 PROCEDURE — 2580000003 HC RX 258

## 2025-03-06 PROCEDURE — 6360000002 HC RX W HCPCS: Performed by: SURGERY

## 2025-03-06 PROCEDURE — 86885 COOMBS TEST INDIRECT QUAL: CPT

## 2025-03-06 PROCEDURE — 80053 COMPREHEN METABOLIC PANEL: CPT

## 2025-03-06 PROCEDURE — 2500000003 HC RX 250 WO HCPCS: Performed by: NURSE ANESTHETIST, CERTIFIED REGISTERED

## 2025-03-06 PROCEDURE — 82948 REAGENT STRIP/BLOOD GLUCOSE: CPT

## 2025-03-06 PROCEDURE — 90935 HEMODIALYSIS ONE EVALUATION: CPT

## 2025-03-06 PROCEDURE — 5A1D70Z PERFORMANCE OF URINARY FILTRATION, INTERMITTENT, LESS THAN 6 HOURS PER DAY: ICD-10-PCS | Performed by: INTERNAL MEDICINE

## 2025-03-06 PROCEDURE — 3700000000 HC ANESTHESIA ATTENDED CARE: Performed by: SURGERY

## 2025-03-06 PROCEDURE — 3600000003 HC SURGERY LEVEL 3 BASE: Performed by: SURGERY

## 2025-03-06 PROCEDURE — 6360000002 HC RX W HCPCS: Performed by: PHYSICIAN ASSISTANT

## 2025-03-06 PROCEDURE — 87636 SARSCOV2 & INF A&B AMP PRB: CPT

## 2025-03-06 PROCEDURE — 03180ZD BYPASS LEFT BRACHIAL ARTERY TO UPPER ARM VEIN, OPEN APPROACH: ICD-10-PCS | Performed by: SURGERY

## 2025-03-06 PROCEDURE — 2580000003 HC RX 258: Performed by: PHYSICIAN ASSISTANT

## 2025-03-06 PROCEDURE — 3700000001 HC ADD 15 MINUTES (ANESTHESIA): Performed by: SURGERY

## 2025-03-06 PROCEDURE — 86922 COMPATIBILITY TEST ANTIGLOB: CPT

## 2025-03-06 PROCEDURE — 99222 1ST HOSP IP/OBS MODERATE 55: CPT | Performed by: SURGERY

## 2025-03-06 PROCEDURE — 85384 FIBRINOGEN ACTIVITY: CPT

## 2025-03-06 PROCEDURE — 6360000002 HC RX W HCPCS: Performed by: EMERGENCY MEDICINE

## 2025-03-06 PROCEDURE — 99222 1ST HOSP IP/OBS MODERATE 55: CPT | Performed by: INTERNAL MEDICINE

## 2025-03-06 PROCEDURE — P9016 RBC LEUKOCYTES REDUCED: HCPCS

## 2025-03-06 PROCEDURE — 86901 BLOOD TYPING SEROLOGIC RH(D): CPT

## 2025-03-06 PROCEDURE — 36832 AV FISTULA REVISION OPEN: CPT | Performed by: SURGERY

## 2025-03-06 PROCEDURE — 7100000000 HC PACU RECOVERY - FIRST 15 MIN: Performed by: SURGERY

## 2025-03-06 PROCEDURE — 85730 THROMBOPLASTIN TIME PARTIAL: CPT

## 2025-03-06 PROCEDURE — 30233N1 TRANSFUSION OF NONAUTOLOGOUS RED BLOOD CELLS INTO PERIPHERAL VEIN, PERCUTANEOUS APPROACH: ICD-10-PCS | Performed by: INTERNAL MEDICINE

## 2025-03-06 PROCEDURE — 7100000001 HC PACU RECOVERY - ADDTL 15 MIN: Performed by: SURGERY

## 2025-03-06 PROCEDURE — 3600000013 HC SURGERY LEVEL 3 ADDTL 15MIN: Performed by: SURGERY

## 2025-03-06 PROCEDURE — 36415 COLL VENOUS BLD VENIPUNCTURE: CPT

## 2025-03-06 PROCEDURE — 85025 COMPLETE CBC W/AUTO DIFF WBC: CPT

## 2025-03-06 PROCEDURE — C1768 GRAFT, VASCULAR: HCPCS | Performed by: SURGERY

## 2025-03-06 PROCEDURE — 83735 ASSAY OF MAGNESIUM: CPT

## 2025-03-06 PROCEDURE — 93005 ELECTROCARDIOGRAM TRACING: CPT

## 2025-03-06 PROCEDURE — 85347 COAGULATION TIME ACTIVATED: CPT

## 2025-03-06 PROCEDURE — 2500000003 HC RX 250 WO HCPCS: Performed by: PHYSICIAN ASSISTANT

## 2025-03-06 PROCEDURE — 96376 TX/PRO/DX INJ SAME DRUG ADON: CPT

## 2025-03-06 PROCEDURE — 03L80ZZ OCCLUSION OF LEFT BRACHIAL ARTERY, OPEN APPROACH: ICD-10-PCS | Performed by: SURGERY

## 2025-03-06 PROCEDURE — 87641 MR-STAPH DNA AMP PROBE: CPT

## 2025-03-06 PROCEDURE — 85610 PROTHROMBIN TIME: CPT

## 2025-03-06 PROCEDURE — 87340 HEPATITIS B SURFACE AG IA: CPT

## 2025-03-06 PROCEDURE — 2100000000 HC CCU R&B

## 2025-03-06 PROCEDURE — 6370000000 HC RX 637 (ALT 250 FOR IP): Performed by: PHARMACIST

## 2025-03-06 PROCEDURE — 90000 NO LOS: CPT | Performed by: PHYSICIAN ASSISTANT

## 2025-03-06 PROCEDURE — 96375 TX/PRO/DX INJ NEW DRUG ADDON: CPT

## 2025-03-06 PROCEDURE — 6370000000 HC RX 637 (ALT 250 FOR IP): Performed by: SURGERY

## 2025-03-06 PROCEDURE — 2709999900 HC NON-CHARGEABLE SUPPLY: Performed by: SURGERY

## 2025-03-06 PROCEDURE — 85576 BLOOD PLATELET AGGREGATION: CPT

## 2025-03-06 PROCEDURE — 6360000002 HC RX W HCPCS: Performed by: NURSE ANESTHETIST, CERTIFIED REGISTERED

## 2025-03-06 PROCEDURE — 96374 THER/PROPH/DIAG INJ IV PUSH: CPT

## 2025-03-06 DEVICE — FLIXENE 4-7MMX45CM GWT, W/DBL GDS
Type: IMPLANTABLE DEVICE | Site: ARM | Status: FUNCTIONAL
Brand: FLIXENE SLIDER GDS

## 2025-03-06 RX ORDER — OXYCODONE HYDROCHLORIDE 5 MG/1
5 TABLET ORAL EVERY 4 HOURS PRN
Status: DISCONTINUED | OUTPATIENT
Start: 2025-03-06 | End: 2025-03-11 | Stop reason: HOSPADM

## 2025-03-06 RX ORDER — NITROGLYCERIN 20 MG/100ML
5-200 INJECTION INTRAVENOUS CONTINUOUS PRN
Status: DISCONTINUED | OUTPATIENT
Start: 2025-03-06 | End: 2025-03-09

## 2025-03-06 RX ORDER — MAGNESIUM HYDROXIDE 1200 MG/15ML
LIQUID ORAL CONTINUOUS PRN
Status: COMPLETED | OUTPATIENT
Start: 2025-03-06 | End: 2025-03-06

## 2025-03-06 RX ORDER — ONDANSETRON 2 MG/ML
INJECTION INTRAMUSCULAR; INTRAVENOUS
Status: COMPLETED
Start: 2025-03-06 | End: 2025-03-06

## 2025-03-06 RX ORDER — LANOLIN ALCOHOL/MO/W.PET/CERES
100 CREAM (GRAM) TOPICAL DAILY
Status: DISCONTINUED | OUTPATIENT
Start: 2025-03-06 | End: 2025-03-11 | Stop reason: HOSPADM

## 2025-03-06 RX ORDER — ONDANSETRON 4 MG/1
4 TABLET, FILM COATED ORAL EVERY 8 HOURS PRN
Status: DISCONTINUED | OUTPATIENT
Start: 2025-03-06 | End: 2025-03-11 | Stop reason: HOSPADM

## 2025-03-06 RX ORDER — AMIODARONE HYDROCHLORIDE 200 MG/1
200 TABLET ORAL DAILY
Status: DISCONTINUED | OUTPATIENT
Start: 2025-03-06 | End: 2025-03-11 | Stop reason: HOSPADM

## 2025-03-06 RX ORDER — DEXTROSE MONOHYDRATE AND SODIUM CHLORIDE 5; .45 G/100ML; G/100ML
INJECTION, SOLUTION INTRAVENOUS CONTINUOUS
Status: DISCONTINUED | OUTPATIENT
Start: 2025-03-06 | End: 2025-03-06

## 2025-03-06 RX ORDER — CLINDAMYCIN PHOSPHATE 10 MG/G
GEL TOPICAL 2 TIMES DAILY
Status: DISCONTINUED | OUTPATIENT
Start: 2025-03-06 | End: 2025-03-06 | Stop reason: CLARIF

## 2025-03-06 RX ORDER — ASPIRIN 81 MG/1
81 TABLET ORAL DAILY
Status: DISCONTINUED | OUTPATIENT
Start: 2025-03-06 | End: 2025-03-11 | Stop reason: HOSPADM

## 2025-03-06 RX ORDER — TRAZODONE HYDROCHLORIDE 100 MG/1
100 TABLET ORAL NIGHTLY PRN
Status: DISCONTINUED | OUTPATIENT
Start: 2025-03-06 | End: 2025-03-11 | Stop reason: HOSPADM

## 2025-03-06 RX ORDER — MAGNESIUM SULFATE IN WATER 40 MG/ML
2000 INJECTION, SOLUTION INTRAVENOUS ONCE
Status: COMPLETED | OUTPATIENT
Start: 2025-03-06 | End: 2025-03-06

## 2025-03-06 RX ORDER — HYDROXYZINE PAMOATE 50 MG/1
50 CAPSULE ORAL 3 TIMES DAILY PRN
Status: DISCONTINUED | OUTPATIENT
Start: 2025-03-06 | End: 2025-03-11 | Stop reason: HOSPADM

## 2025-03-06 RX ORDER — HYDRALAZINE HYDROCHLORIDE 20 MG/ML
10 INJECTION INTRAMUSCULAR; INTRAVENOUS
Status: DISCONTINUED | OUTPATIENT
Start: 2025-03-06 | End: 2025-03-11 | Stop reason: HOSPADM

## 2025-03-06 RX ORDER — ATORVASTATIN CALCIUM 40 MG/1
40 TABLET, FILM COATED ORAL DAILY
Status: DISCONTINUED | OUTPATIENT
Start: 2025-03-06 | End: 2025-03-11 | Stop reason: HOSPADM

## 2025-03-06 RX ORDER — SEVELAMER CARBONATE 800 MG/1
800 TABLET, FILM COATED ORAL DAILY
Status: DISCONTINUED | OUTPATIENT
Start: 2025-03-06 | End: 2025-03-06

## 2025-03-06 RX ORDER — LABETALOL HYDROCHLORIDE 5 MG/ML
10 INJECTION, SOLUTION INTRAVENOUS
Status: DISCONTINUED | OUTPATIENT
Start: 2025-03-06 | End: 2025-03-11 | Stop reason: HOSPADM

## 2025-03-06 RX ORDER — HYDROMORPHONE HYDROCHLORIDE 2 MG/ML
INJECTION, SOLUTION INTRAMUSCULAR; INTRAVENOUS; SUBCUTANEOUS
Status: DISPENSED
Start: 2025-03-06 | End: 2025-03-06

## 2025-03-06 RX ORDER — FLUTICASONE PROPIONATE 50 MCG
1 SPRAY, SUSPENSION (ML) NASAL DAILY
Status: DISCONTINUED | OUTPATIENT
Start: 2025-03-06 | End: 2025-03-06

## 2025-03-06 RX ORDER — PROPOFOL 10 MG/ML
INJECTION, EMULSION INTRAVENOUS
Status: DISCONTINUED | OUTPATIENT
Start: 2025-03-06 | End: 2025-03-06 | Stop reason: SDUPTHER

## 2025-03-06 RX ORDER — HEPARIN SODIUM 1000 [USP'U]/ML
INJECTION, SOLUTION INTRAVENOUS; SUBCUTANEOUS
Status: DISCONTINUED | OUTPATIENT
Start: 2025-03-06 | End: 2025-03-06 | Stop reason: SDUPTHER

## 2025-03-06 RX ORDER — DEXTROSE MONOHYDRATE, SODIUM CHLORIDE, AND POTASSIUM CHLORIDE 50; 1.49; 4.5 G/1000ML; G/1000ML; G/1000ML
1000 INJECTION, SOLUTION INTRAVENOUS CONTINUOUS
Status: DISCONTINUED | OUTPATIENT
Start: 2025-03-06 | End: 2025-03-06

## 2025-03-06 RX ORDER — SODIUM CHLORIDE 9 MG/ML
INJECTION, SOLUTION INTRAVENOUS PRN
Status: DISCONTINUED | OUTPATIENT
Start: 2025-03-06 | End: 2025-03-11 | Stop reason: HOSPADM

## 2025-03-06 RX ORDER — SODIUM CHLORIDE 0.9 % (FLUSH) 0.9 %
5-40 SYRINGE (ML) INJECTION PRN
Status: DISCONTINUED | OUTPATIENT
Start: 2025-03-06 | End: 2025-03-11 | Stop reason: HOSPADM

## 2025-03-06 RX ORDER — WARFARIN SODIUM 2.5 MG/1
2.5 TABLET ORAL DAILY
Status: DISCONTINUED | OUTPATIENT
Start: 2025-03-07 | End: 2025-03-06 | Stop reason: ALTCHOICE

## 2025-03-06 RX ORDER — HEPARIN SODIUM 1000 [USP'U]/ML
INJECTION, SOLUTION INTRAVENOUS; SUBCUTANEOUS PRN
Status: DISCONTINUED | OUTPATIENT
Start: 2025-03-06 | End: 2025-03-06 | Stop reason: ALTCHOICE

## 2025-03-06 RX ORDER — PROTAMINE SULFATE 10 MG/ML
INJECTION, SOLUTION INTRAVENOUS
Status: DISCONTINUED | OUTPATIENT
Start: 2025-03-06 | End: 2025-03-06 | Stop reason: SDUPTHER

## 2025-03-06 RX ORDER — EPHEDRINE SULFATE/0.9% NACL/PF 25 MG/5 ML
SYRINGE (ML) INTRAVENOUS
Status: DISCONTINUED | OUTPATIENT
Start: 2025-03-06 | End: 2025-03-06 | Stop reason: SDUPTHER

## 2025-03-06 RX ORDER — NOREPINEPHRINE BITARTRATE 0.06 MG/ML
1-100 INJECTION, SOLUTION INTRAVENOUS CONTINUOUS PRN
Status: DISCONTINUED | OUTPATIENT
Start: 2025-03-06 | End: 2025-03-08

## 2025-03-06 RX ORDER — LIDOCAINE HCL/PF 100 MG/5ML
SYRINGE (ML) INJECTION
Status: DISCONTINUED | OUTPATIENT
Start: 2025-03-06 | End: 2025-03-06 | Stop reason: SDUPTHER

## 2025-03-06 RX ORDER — OXYCODONE HYDROCHLORIDE 5 MG/1
10 TABLET ORAL EVERY 4 HOURS PRN
Status: DISCONTINUED | OUTPATIENT
Start: 2025-03-06 | End: 2025-03-11 | Stop reason: HOSPADM

## 2025-03-06 RX ORDER — ALBUTEROL SULFATE 90 UG/1
2 INHALANT RESPIRATORY (INHALATION) EVERY 6 HOURS PRN
Status: DISCONTINUED | OUTPATIENT
Start: 2025-03-06 | End: 2025-03-11 | Stop reason: HOSPADM

## 2025-03-06 RX ORDER — OXYCODONE AND ACETAMINOPHEN 5; 325 MG/1; MG/1
1 TABLET ORAL EVERY 6 HOURS PRN
Status: DISCONTINUED | OUTPATIENT
Start: 2025-03-06 | End: 2025-03-06 | Stop reason: ALTCHOICE

## 2025-03-06 RX ORDER — SUCCINYLCHOLINE/SOD CL,ISO/PF 200MG/10ML
SYRINGE (ML) INTRAVENOUS
Status: DISCONTINUED | OUTPATIENT
Start: 2025-03-06 | End: 2025-03-06 | Stop reason: SDUPTHER

## 2025-03-06 RX ORDER — VITAMIN B COMPLEX
5000 TABLET ORAL DAILY
Status: DISCONTINUED | OUTPATIENT
Start: 2025-03-06 | End: 2025-03-11 | Stop reason: HOSPADM

## 2025-03-06 RX ORDER — OMEGA-3 FATTY ACIDS/FISH OIL 300-1000MG
1 CAPSULE ORAL DAILY
Status: DISCONTINUED | OUTPATIENT
Start: 2025-03-06 | End: 2025-03-06 | Stop reason: CLARIF

## 2025-03-06 RX ORDER — MULTIVITAMIN WITH IRON
1 TABLET ORAL DAILY
Status: DISCONTINUED | OUTPATIENT
Start: 2025-03-07 | End: 2025-03-11 | Stop reason: HOSPADM

## 2025-03-06 RX ORDER — FENTANYL CITRATE 50 UG/ML
INJECTION, SOLUTION INTRAMUSCULAR; INTRAVENOUS
Status: DISCONTINUED | OUTPATIENT
Start: 2025-03-06 | End: 2025-03-06 | Stop reason: SDUPTHER

## 2025-03-06 RX ORDER — ONDANSETRON 2 MG/ML
4 INJECTION INTRAMUSCULAR; INTRAVENOUS EVERY 6 HOURS PRN
Status: DISCONTINUED | OUTPATIENT
Start: 2025-03-06 | End: 2025-03-11 | Stop reason: HOSPADM

## 2025-03-06 RX ORDER — LABETALOL 100 MG/1
100 TABLET, FILM COATED ORAL EVERY 8 HOURS SCHEDULED
Status: DISCONTINUED | OUTPATIENT
Start: 2025-03-06 | End: 2025-03-11 | Stop reason: HOSPADM

## 2025-03-06 RX ORDER — SODIUM CHLORIDE 9 MG/ML
INJECTION, SOLUTION INTRAVENOUS
Status: DISCONTINUED | OUTPATIENT
Start: 2025-03-06 | End: 2025-03-06 | Stop reason: SDUPTHER

## 2025-03-06 RX ORDER — SODIUM CHLORIDE 0.9 % (FLUSH) 0.9 %
5-40 SYRINGE (ML) INJECTION EVERY 12 HOURS SCHEDULED
Status: DISCONTINUED | OUTPATIENT
Start: 2025-03-06 | End: 2025-03-11 | Stop reason: HOSPADM

## 2025-03-06 RX ORDER — ISOSORBIDE MONONITRATE 60 MG/1
120 TABLET, EXTENDED RELEASE ORAL 2 TIMES DAILY
Status: DISCONTINUED | OUTPATIENT
Start: 2025-03-06 | End: 2025-03-11 | Stop reason: HOSPADM

## 2025-03-06 RX ORDER — ONDANSETRON 2 MG/ML
INJECTION INTRAMUSCULAR; INTRAVENOUS
Status: DISCONTINUED | OUTPATIENT
Start: 2025-03-06 | End: 2025-03-06 | Stop reason: SDUPTHER

## 2025-03-06 RX ORDER — HYDRALAZINE HYDROCHLORIDE 50 MG/1
100 TABLET, FILM COATED ORAL EVERY 8 HOURS
Status: DISCONTINUED | OUTPATIENT
Start: 2025-03-06 | End: 2025-03-11 | Stop reason: HOSPADM

## 2025-03-06 RX ORDER — VASOPRESSIN 20 [USP'U]/ML
INJECTION, SOLUTION INTRAVENOUS
Status: DISCONTINUED | OUTPATIENT
Start: 2025-03-06 | End: 2025-03-06 | Stop reason: SDUPTHER

## 2025-03-06 RX ORDER — MIDAZOLAM HYDROCHLORIDE 1 MG/ML
INJECTION, SOLUTION INTRAMUSCULAR; INTRAVENOUS
Status: DISCONTINUED | OUTPATIENT
Start: 2025-03-06 | End: 2025-03-06 | Stop reason: SDUPTHER

## 2025-03-06 RX ORDER — CALCITRIOL 0.25 UG/1
0.5 CAPSULE, LIQUID FILLED ORAL
Status: DISCONTINUED | OUTPATIENT
Start: 2025-03-07 | End: 2025-03-11 | Stop reason: HOSPADM

## 2025-03-06 RX ORDER — ROCURONIUM BROMIDE 10 MG/ML
INJECTION, SOLUTION INTRAVENOUS
Status: DISCONTINUED | OUTPATIENT
Start: 2025-03-06 | End: 2025-03-06 | Stop reason: SDUPTHER

## 2025-03-06 RX ORDER — AMLODIPINE BESYLATE 5 MG/1
5 TABLET ORAL DAILY
Status: DISCONTINUED | OUTPATIENT
Start: 2025-03-06 | End: 2025-03-11 | Stop reason: HOSPADM

## 2025-03-06 RX ORDER — CEFAZOLIN SODIUM 1 G/3ML
INJECTION, POWDER, FOR SOLUTION INTRAMUSCULAR; INTRAVENOUS
Status: DISCONTINUED | OUTPATIENT
Start: 2025-03-06 | End: 2025-03-06 | Stop reason: SDUPTHER

## 2025-03-06 RX ORDER — CINACALCET 30 MG/1
90 TABLET, FILM COATED ORAL
Status: DISCONTINUED | OUTPATIENT
Start: 2025-03-07 | End: 2025-03-11 | Stop reason: HOSPADM

## 2025-03-06 RX ORDER — ONDANSETRON 2 MG/ML
4 INJECTION INTRAMUSCULAR; INTRAVENOUS ONCE
Status: COMPLETED | OUTPATIENT
Start: 2025-03-06 | End: 2025-03-06

## 2025-03-06 RX ORDER — WARFARIN SODIUM 7.5 MG/1
7.5 TABLET ORAL ONCE
Status: COMPLETED | OUTPATIENT
Start: 2025-03-06 | End: 2025-03-06

## 2025-03-06 RX ADMIN — ONDANSETRON 4 MG: 2 INJECTION, SOLUTION INTRAMUSCULAR; INTRAVENOUS at 06:16

## 2025-03-06 RX ADMIN — FENTANYL CITRATE 100 MCG: 50 INJECTION, SOLUTION INTRAMUSCULAR; INTRAVENOUS at 03:50

## 2025-03-06 RX ADMIN — EPHEDRINE SULFATE 10 MG: 5 INJECTION INTRAVENOUS at 04:10

## 2025-03-06 RX ADMIN — EPHEDRINE SULFATE 10 MG: 5 INJECTION INTRAVENOUS at 04:13

## 2025-03-06 RX ADMIN — SODIUM CHLORIDE: 9 INJECTION, SOLUTION INTRAVENOUS at 03:45

## 2025-03-06 RX ADMIN — HEPARIN SODIUM 10000 UNITS: 1000 INJECTION INTRAVENOUS; SUBCUTANEOUS at 05:21

## 2025-03-06 RX ADMIN — SUGAMMADEX 200 MG: 100 INJECTION, SOLUTION INTRAVENOUS at 07:52

## 2025-03-06 RX ADMIN — ROCURONIUM BROMIDE 30 MG: 50 INJECTION INTRAVENOUS at 04:15

## 2025-03-06 RX ADMIN — CEFAZOLIN 2 G: 1 INJECTION, POWDER, FOR SOLUTION INTRAMUSCULAR; INTRAVENOUS at 03:56

## 2025-03-06 RX ADMIN — MAGNESIUM SULFATE HEPTAHYDRATE 2000 MG: 40 INJECTION, SOLUTION INTRAVENOUS at 03:09

## 2025-03-06 RX ADMIN — Medication 100 MG: at 03:53

## 2025-03-06 RX ADMIN — ISOSORBIDE MONONITRATE 120 MG: 60 TABLET, EXTENDED RELEASE ORAL at 12:58

## 2025-03-06 RX ADMIN — PROPOFOL 150 MG: 10 INJECTION, EMULSION INTRAVENOUS at 03:53

## 2025-03-06 RX ADMIN — OXYCODONE HYDROCHLORIDE 10 MG: 5 TABLET ORAL at 17:48

## 2025-03-06 RX ADMIN — HYDRALAZINE HYDROCHLORIDE 100 MG: 50 TABLET ORAL at 20:06

## 2025-03-06 RX ADMIN — Medication 30 MG: at 03:53

## 2025-03-06 RX ADMIN — SODIUM CHLORIDE 11.5 MG/HR: 9 INJECTION, SOLUTION INTRAVENOUS at 12:08

## 2025-03-06 RX ADMIN — Medication 200 MG: at 03:53

## 2025-03-06 RX ADMIN — HYDROMORPHONE HYDROCHLORIDE 0.5 MG: 1 INJECTION, SOLUTION INTRAMUSCULAR; INTRAVENOUS; SUBCUTANEOUS at 03:05

## 2025-03-06 RX ADMIN — ATORVASTATIN CALCIUM 40 MG: 40 TABLET, FILM COATED ORAL at 20:06

## 2025-03-06 RX ADMIN — SODIUM CHLORIDE, PRESERVATIVE FREE 10 ML: 5 INJECTION INTRAVENOUS at 20:07

## 2025-03-06 RX ADMIN — CLONIDINE HYDROCHLORIDE 0.3 MG: 0.2 TABLET ORAL at 12:58

## 2025-03-06 RX ADMIN — ISOSORBIDE MONONITRATE 120 MG: 60 TABLET, EXTENDED RELEASE ORAL at 20:48

## 2025-03-06 RX ADMIN — Medication 1 MG: at 02:41

## 2025-03-06 RX ADMIN — PROTAMINE SULFATE 50 MG: 10 INJECTION, SOLUTION INTRAVENOUS at 06:18

## 2025-03-06 RX ADMIN — ONDANSETRON 4 MG: 2 INJECTION INTRAMUSCULAR; INTRAVENOUS at 02:41

## 2025-03-06 RX ADMIN — WARFARIN SODIUM 7.5 MG: 7.5 TABLET ORAL at 17:49

## 2025-03-06 RX ADMIN — HYDROMORPHONE HYDROCHLORIDE 0.5 MG: 1 INJECTION, SOLUTION INTRAMUSCULAR; INTRAVENOUS; SUBCUTANEOUS at 10:30

## 2025-03-06 RX ADMIN — VASOPRESSIN 1 UNITS: 20 INJECTION INTRAVENOUS at 04:50

## 2025-03-06 RX ADMIN — HYDROMORPHONE HYDROCHLORIDE 1 MG: 1 INJECTION, SOLUTION INTRAMUSCULAR; INTRAVENOUS; SUBCUTANEOUS at 02:41

## 2025-03-06 RX ADMIN — EPHEDRINE SULFATE 10 MG: 5 INJECTION INTRAVENOUS at 04:07

## 2025-03-06 RX ADMIN — ROCURONIUM BROMIDE 20 MG: 50 INJECTION INTRAVENOUS at 05:15

## 2025-03-06 RX ADMIN — HYDROMORPHONE HYDROCHLORIDE 0.5 MG: 1 INJECTION, SOLUTION INTRAMUSCULAR; INTRAVENOUS; SUBCUTANEOUS at 02:57

## 2025-03-06 RX ADMIN — MIDAZOLAM 2 MG: 1 INJECTION INTRAMUSCULAR; INTRAVENOUS at 03:49

## 2025-03-06 RX ADMIN — Medication 20 MG: at 05:15

## 2025-03-06 RX ADMIN — CLONIDINE HYDROCHLORIDE 0.3 MG: 0.2 TABLET ORAL at 20:48

## 2025-03-06 RX ADMIN — HYDRALAZINE HYDROCHLORIDE 100 MG: 50 TABLET ORAL at 12:58

## 2025-03-06 RX ADMIN — DEXTROSE AND SODIUM CHLORIDE: 5; .45 INJECTION, SOLUTION INTRAVENOUS at 09:38

## 2025-03-06 RX ADMIN — SODIUM CHLORIDE 5 MG/HR: 9 INJECTION, SOLUTION INTRAVENOUS at 03:25

## 2025-03-06 RX ADMIN — EPHEDRINE SULFATE 10 MG: 5 INJECTION INTRAVENOUS at 04:19

## 2025-03-06 RX ADMIN — VASOPRESSIN 1 UNITS: 20 INJECTION INTRAVENOUS at 04:24

## 2025-03-06 RX ADMIN — EPHEDRINE SULFATE 10 MG: 5 INJECTION INTRAVENOUS at 04:16

## 2025-03-06 ASSESSMENT — PAIN SCALES - GENERAL
PAINLEVEL_OUTOF10: 10
PAINLEVEL_OUTOF10: 8
PAINLEVEL_OUTOF10: 9
PAINLEVEL_OUTOF10: 9
PAINLEVEL_OUTOF10: 0
PAINLEVEL_OUTOF10: 10

## 2025-03-06 ASSESSMENT — PAIN DESCRIPTION - LOCATION
LOCATION: ARM
LOCATION: ARM

## 2025-03-06 ASSESSMENT — PAIN DESCRIPTION - DESCRIPTORS: DESCRIPTORS: ACHING;SHARP

## 2025-03-06 ASSESSMENT — LIFESTYLE VARIABLES: SMOKING_STATUS: 1

## 2025-03-06 ASSESSMENT — PAIN - FUNCTIONAL ASSESSMENT: PAIN_FUNCTIONAL_ASSESSMENT: NONE - DENIES PAIN

## 2025-03-06 ASSESSMENT — PAIN DESCRIPTION - ORIENTATION: ORIENTATION: LEFT

## 2025-03-06 NOTE — ED PROVIDER NOTES
[facility]  EMERGENCY DEPARTMENT ENCOUNTER      Patient Name: Trav Jennings  MRN: [id number]  YOB: 1965  Date of Evaluation: 3/5/2025  Attending Physician: Saeid Nation MD    CHIEF COMPLAINT       Chief Complaint   Patient presents with    Chest Pain       HISTORY OF PRESENT ILLNESS    HPI    History obtained from chart review and the patient.    Trav is a 60 y.o. old male who presents to the emergency department by Ambulance here for evaluation of chest pain and tachycardia which occurred while he was receiving dialysis.  Patient has a complex medical history including end-stage renal disease on hemodialysis follows with Dr. Brown.  He also has a history of known abdominal aortic aneurysm status post repair.  History of cocaine use.  He missed dialysis on Monday due to family emergency and was having dialysis today he reports that he was up 5 kg from his normal dry weight predialysis.  About 2 and half hours into his 4-hour dialysis session started having tachycardia and chest pain.  EMS was called.  EMS noted that his oxygen was hooked up to the wall but the oxygen was not turned on he is usually on 3 to 4 L continuously for COPD.  On arrival to the emergency department and his symptoms resolved his chest pain improved his tachycardia was still present.  No recent illnesses no shortness of breath    Chart reviewed, relevant history summarized in HPI above.      REVIEW OF SYSTEMS   Review of Systems  Negative unless documented in HPI    PAST MEDICAL AND SURGICAL HISTORY   Trav  has a past medical history of AAA (abdominal aortic aneurysm), NURIS (acute kidney injury) (9/24/2015), Anemia associated with chronic renal failure, Arthritis, Cocaine abuse (HCC) (5/10/2014), Depression, Diabetes mellitus ([facility]), FSGS (focal segmental glomerulosclerosis) (5/23/2013), Hemodialysis patient (10/17/2016), Hemorrhoids (1/16/2012), History of blood transfusion, Hyperlipidemia,

## 2025-03-06 NOTE — PROGRESS NOTES
Warfarin Pharmacy Consult Note    Trav Jennings is a 60 y.o. male for whom pharmacy has been asked to manage warfarin therapy.     Consulting Provider: DEMETRICE Werner  Indication: Atrial fibrillation/Atrial flutter  Target INR: 2.0-3.0   Warfarin dose prior to admission: 7.5mg daily (hx of noncompliance)  Outpatient warfarin provider: [facility]    Recent Labs     03/06/25  0239 03/06/25  0645 03/06/25  0907   HGB 9.3* 7.1* 9.3*   * 81* 93*     Recent Labs     03/05/25  1906 03/06/25  0239   INR 1.95* 1.80*     Concurrent anticoagulants/antiplatelets: Aspirin  Significant warfarin drug-drug interactions: Amiodarone, Sertraline    Date INR Warfarin Dose   3/6/2025 1.8 7.5 mg                                   INR will be monitored routinely until therapeutic INR is achieved.    Pharmacy will continue to follow. Thank you for the consult,   KATIE HUMPHREY [facility]  3/6/2025  12:02 PM

## 2025-03-06 NOTE — ED TRIAGE NOTES
Pt presents to ED following being discharged earlier this night. Pt was here and fistula was de accessed. No complications discharge and de accessing fistula. Pt states he was sitting at home in chair when he felt a \"whoosh\" and fistula started bleeding. Pt placed belt as tourniquet prior to EMS arrival. Tourniquet was placed per EMS at 0215. Pt was given 50 mcg of fentanyl en route. Pt moved self to cot from EMS stretcher.

## 2025-03-06 NOTE — PROGRESS NOTES
803 non reactive on arrival to PACU with spontaneous resp , oral airway and NC to oral airway  806 awake and looking around , oral airway removed and O2 moved to NC BP elevated cardene drip started  810 awake and oriented , denies any pain or nausea  835 pt unchanged , meets criteria for discharge ,placed on transport monitor and takens to CCU with this R.N at bedside , unable to locate family , was told by 2 nd nai  pt was going home to eat and take nap and came back latter   845 care turned over to [facility] CCU R.N

## 2025-03-06 NOTE — ED PROVIDER NOTES
[facility] ICU STEPDOWN TELEMETRY 4K  EMERGENCY DEPARTMENT ENCOUNTER          Pt Name: Trav Jennings  MRN: [id number]  Birthdate 1965  Date of evaluation: 3/6/2025  Physician: Carolyne Marino MD  Supervising Attending Physician: Jose Salas DO       CHIEF COMPLAINT     No chief complaint on file.        HISTORY OF PRESENT ILLNESS    HPI  Trav Jennings is a 60 y.o. male who presents to the emergency department from home, brought in by EMS for evaluation of massive bleed from his left arm AV fistula.  Patient went to sitting on the couch when all of a sudden he heard a popping sound and noted that his left arms AV fistula was bleeding out.  He attempted to put a belt over it but it kept bleeding, so he called 911 where EMS placed a tourniquet on his left arm.  He is now having left arm numbness and pain, but denies any other symptoms including chest pain, dizziness, headache, loss of consciousness, trauma, shortness of breath, abdominal pain, nausea, vomiting, or fever.  The patient has no other acute complaints at this time.      REVIEW OF SYSTEMS   Review of Systems      PAST MEDICAL AND SURGICAL HISTORY     Past Medical History:   Diagnosis Date    AAA (abdominal aortic aneurysm)     NURIS (acute kidney injury) 9/24/2015    Anemia associated with chronic renal failure     Arthritis     stated in hands    Cocaine abuse (HCC) 5/10/2014    Depression     Diabetes mellitus (HCC)     pt states he no longer has diabetes he has lost alot of davion.     FSGS (focal segmental glomerulosclerosis) 5/23/2013    Hemodialysis patient 10/17/2016    on hemodialysis with [facility]    Hemorrhoids 1/16/2012    History of blood transfusion     Hyperlipidemia     Hyperphosphatemia 5/21/2016    Hypertension     Left renal artery stenosis 5/22/2014    Monoclonal (M) protein disease, multiple 'M' protein     Nicotine dependence 6/16/2014    Noncompliance     Pneumonia     Psychiatric problem      PTSD (post-traumatic stress disorder)     Pt vet from [facility]    Secondary hyperparathyroidism (of renal origin)     Sleep apnea      Past Surgical History:   Procedure Laterality Date    ABDOMINAL AORTIC ANEURYSM REPAIR      BRONCHOSCOPY N/A 10/6/2022    BRONCHOSCOPY performed by Theodore Ng MD at [facility]    DIALYSIS FISTULA CREATION Left 07/08/2016    DIALYSIS FISTULA CREATION Left 3/6/2025    LEFT UPPER EXTREMITY LIGATION OF BRACHIOCEPHALIC FISTULA, LEFT UPPER EXTREMITY BRACHIOCEPHALIC GRAFT, EXCISION ANEURYSM LEFT UPPER EXTREMITY FISTULA performed by Tony Garcia MD at [facility] OR    EKG 12-LEAD  9/24/2015         HAND TENDON SURGERY Left 4/5/2019    LEFT THUMB FLEXOR TENDON REPAIR performed by Edu Cervantes MD at [facility] OR    LEG TENDON SURGERY      [facility] US GUID NDL BIOPSY RIGHT Right 3/18/2024    BRIAN US GUID NDL BIOPSY RIGHT 3/18/2024 Michele Lee MD [facility]    THORACOTOMY Right 10/10/2022    Right Thoracotomy & Decortication with Partial Lung Resection performed by Ralph Becerra MD at [facility] OR    VASCULAR SURGERY Left 07/08/2016    AV Fistula    VASCULAR SURGERY Right 1990    Surgery on Achilles tendon         MEDICATIONS     Current Facility-Administered Medications:     folic acid (FOLVITE) tablet 1 mg, 1 mg, Oral, Daily, Edu Page, , 1 mg at 03/08/25 1519    epoetin qiana-epbx (RETACRIT) 6,000 Units combo injection, 6,000 Units, SubCUTAneous, Once per day on Monday Wednesday Friday, Hermila Clayton DO, 6,000 Units at 03/07/25 1841    albuterol sulfate HFA (PROVENTIL;VENTOLIN;PROAIR) 108 (90 Base) MCG/ACT inhaler 2 puff, 2 puff, Inhalation, BID RT, Juan Spring, , 2 puff at 03/08/25 2018    sodium chloride flush 0.9 % injection 5-40 mL, 5-40 mL, IntraVENous, 2 times per day, Symone Gómez PA, 10 mL at 03/08/25 1951    sodium chloride flush 0.9 % injection 5-40 mL, 5-40 mL, IntraVENous, PRN, Symone Gómez PA    0.9 % sodium

## 2025-03-06 NOTE — DISCHARGE INSTRUCTIONS
Follow-up needed in 2 to 3 days.  Return to ER for any new chest pain shortness of breath or any new symptoms or concerns.

## 2025-03-06 NOTE — OP NOTE
Operative Note      Patient: Trav Jennings  YOB: 1965  MRN: [id number]    Date of Procedure: 3/6/2025    Preop diagnosis:  Acute hemorrhage from left brachiocephalic fistula.      Post-Op Diagnosis: Same       Procedure:  1.  Ligation left upper extremity brachiocephalic fistula.  2.  Excision of aneurysmal portion of left upper extremity AV fistula.  3.  Construction of a brachial to cephalic vein graft using Flixene thick walled graft.    Surgeon(s):  Tony Garcia MD    Assistant:   Physician Assistant: Elda Gómez    Anesthesia: Monitor Anesthesia Care    Estimated Blood Loss (mL): 200     Complications: None    Specimens:   * No specimens in log *    Implants:  Implant Name Type Inv. Item Serial No.  Lot No. LRB No. Used Action   GRAFT VASC 4 [id number] GRAD WALL TAPR DBL GDS FLIXENE - [id number] Vascular grafts GRAFT VASC 4 [id number] GRAD WALL TAPR DBL GDS FLIXENE [id number] [employer]-  Left 1 Implanted         Drains: * No LDAs found *    Findings:  Infection Present At Time Of Surgery (PATOS) (choose all levels that have infection present):  No infection present  Other Findings: There was a large hole in the fistula and 1 of its aneurysmal portions.  This area of fistula was excised.  A graft was constructed from the distal brachial artery above the level of the elbow crease to the level of the cephalic vein just before it goes into the deltoid musculature.  The graft had a palpable thrill at the conclusion of the case.  There was no evidence of infection.  The patient tolerated the procedure well.    Detailed Description of Procedure:   The patient was brought to the operating room and placed in a supine position with his arm outstretched at a 90 degree angle to his body.  He was identified as Trav Jennings for repair of left upper extremity fistula due to active hemorrhage from a large hole in the fistula.  He was given general anesthetic with endotracheal  and the cellophane was removed.  The patient was given 8000 units of heparin.  3 minutes were allowed to elapse.    The brachial artery was then clamped both proximally and distally and an arteriotomy was made measuring 5 mm in diameter.  The flexing graft was sewn onto the artery at its 4 mm end and the anastomosis was performed with 7-0 Prolene in a running fashion starting at the proximal apex and working towards the distal apex.  The second arm of the suture was used to close the anterior wall.  The sutures were tied together.  The clamps were left in place to prevent any bleeding at the needle holes for now.    The distal anastomosis was then performed by transecting the vein after clamping it both proximally and distally near the shoulder.  The vein was then spatulated by a longitudinal venotomy on its anterior surface.  The graft was cut to length and spatulated on its posterior surface and the anastomosis was done with 6-0 Prolene on a CT1 needle from either apex in a four-quadrant running fashion.  The graft was de-aired through the needle holes by opening the vein and then opening the artery.  The distal artery was open first and then reclamped.  The proximal artery was opened and then the distal artery was reopened.  There was a palpable thrill in the graft.  There was needle hole bleeding which was controlled with thrombin-soaked Gelfoam.  The patient was given 50 mg of protamine.    The hole in the fistula was then addressed by an elliptical incision around that defect to include all of the sutures that I had previously placed and the entirety of the hole.  In this way I gained access to the aneurysmal portion of the fistula and excised that aneurysmal portion using Bovie cautery for exposure.  The vein was clamped both proximally and distally and transected and both ends of the veins were oversewn in a similar fashion with 5-0 Prolene using a 2 layer horizontal mattress whipstitch closure.  This  produced excellent hemostasis.  All of the incisions were closed with 4-0 Vicryl in an interrupted simple style at the level of the subcutaneous tissue and 4-0 nylon in interrupted vertical mattress style at the level of the skin.  The proximal forearm incision overlying the original fistula, distal brachial incision and venous anastomotic incision were all closed before exposing the open hole of the original fistula.  This was done due to the interest in preventing infection of the new graft.  There was no bleeding or hematoma at the conclusion the case.  All sponge needle and instrument counts were correct.  The patient was awakened and taken to the recovery room.  He tolerated all of this quite well.  He did need occasional pressor support.  We did draw a host of labs at the conclusion of the procedure for the benefit of the nephrology service to see if he would need dialysis again today.  I think he had dialysis yesterday.  The graft can be used immediately.    Electronically signed by Tony Garcia MD on 3/6/2025 at 7:53 AM

## 2025-03-06 NOTE — ANESTHESIA POSTPROCEDURE EVALUATION
Department of Anesthesiology  Postprocedure Note    Patient: Trav Jennings  MRN: [id number]  YOB: 1965  Date of evaluation: 3/6/2025    Procedure Summary       Date: 03/06/25 Room / Location: [facility] OR 03 / [facility] OR    Anesthesia Start: 0345 Anesthesia Stop: 0806    Procedure: LEFT UPPER EXTREMITY LIGATION OF BRACHIOCEPHALIC FISTULA, LEFT UPPER EXTREMITY BRACHIOCEPHALIC GRAFT, EXCISION ANEURYSM LEFT UPPER EXTREMITY FISTULA (Left) Diagnosis:       Fistula      (Fistula [L98.8])    Surgeons: Tony Garcia MD Responsible Provider: Tom Carty DO    Anesthesia Type: general ASA Status: 4 - Emergent            Anesthesia Type: No value filed.    Tiara Phase I: Tiara Score: 4    Tiara Phase II:      Anesthesia Post Evaluation    Patient location during evaluation: PACU  Patient participation: complete - patient participated  Level of consciousness: awake  Airway patency: patent  Nausea & Vomiting: no nausea  Cardiovascular status: hemodynamically stable  Respiratory status: acceptable  Hydration status: stable  Pain management: adequate    No notable events documented.

## 2025-03-06 NOTE — ED NOTES
Pt resting on cot watching TV. Pt denies pain. Pt denies needs. Call light in reach. RR even and unlabored.

## 2025-03-06 NOTE — FLOWSHEET NOTE
03/06/25 1500   Vital Signs   BP (!) 95/57   Temp 98 °F (36.7 °C)   Pulse 86   Respirations 17   SpO2 97 %   Weight - Scale 96.8 kg (213 lb 6.5 oz)   Weight Method Bed scale   Percent Weight Change -3.01   Post-Hemodialysis Assessment   Post-Treatment Procedures Blood returned;Access bleeding time < 10 minutes   Machine Disinfection Process Acid/Vinegar Clean;Heat Disinfect;Exterior Machine Disinfection   Blood Volume Processed (Liters) 90.3 L   Dialyzer Clearance Lightly streaked   Duration of Treatment (minutes) 240 minutes   Hemodialysis Intake (ml) 300 ml   Hemodialysis Output (ml) 2500 ml   NET Removed (ml) 2200   Tolerated Treatment Good     Stable 4 hour treatment complete with 2.2 liters of fluid removed. Marco A Cheung came to bedside to educate on where to cannulate s/p graft placement this am. Successful cannulation of both needles. Tolerated treatment well.  Pressure held both cannulation sites x 10 minutes. Dressing dry and intact upon discharge from unit. Report called to primary nurse.

## 2025-03-06 NOTE — CONSULTS
[facility]    [address], [place] [address]  [phone]     Hospital Consult  3/6/2025 4:04 PM    Pt Name:    Trav Jennings  MRN:     [id number]   [id number]  YOB: 1965  Admit Date:    3/6/2025  2:31 AM  Primary Care Physician:  Radha Hou, WADE - CNP        Reason for Consult:  ESRD    History:   The patient is a 60 y.o. male seen in renal consult for ESRD. He presented to the ED with massive bleeding from his left arm AV fistula which started at home. He used his felt as a tourniquet prior to EMS arrival. Tourniquet was placed. Dr. Garcia saw him urgent in ED and he was taken to the OR last night emergently for ligation of his fistula, excision of aneurysmal portion, and construction of new flixene graft which could be used immediately for dialysis. Nephrology was consulted for his dialysis management.  He dialyzes TTS, due today. K 6.2.  bp high on cardene drip.    Past Medical History:  Past Medical History:   Diagnosis Date    AAA (abdominal aortic aneurysm)     NURIS (acute kidney injury) 9/24/2015    Anemia associated with chronic renal failure     Arthritis     stated in hands    Cocaine abuse (HCC) 5/10/2014    Depression     Diabetes mellitus (HCC)     pt states he no longer has diabetes he has lost alot of davion.     FSGS (focal segmental glomerulosclerosis) 5/23/2013    Hemodialysis patient 10/17/2016    on hemodialysis with [facility]    Hemorrhoids 1/16/2012    History of blood transfusion     Hyperlipidemia     Hyperphosphatemia 5/21/2016    Hypertension     Left renal artery stenosis 5/22/2014    Monoclonal (M) protein disease, multiple 'M' protein     Nicotine dependence 6/16/2014    Noncompliance     Pneumonia     Psychiatric problem     PTSD (post-traumatic stress disorder)     Pt vet from [facility]    Secondary hyperparathyroidism (of renal origin)     Sleep apnea        Past Surgical History:  Past Surgical  \"TSH\" in the last 72 hours.  HgBa1c: No results for input(s): \"LABA1C\" in the last 72 hours.  Hepatic:   Recent Labs     03/06/25  0239 03/06/25  0645   AST 18 17   ALT 14 9*   BILITOT 0.3 0.2*   ALKPHOS 105 87     ABGs: No results found for: \"PHART\", \"PO2ART\", \"KBJ8XBM\"  Troponin: No results for input(s): \"TROPONINI\" in the last 72 hours.  BNP: No results for input(s): \"BNP\" in the last 72 hours.    Old labs reviewed.       Impression and Plan:  ESRD on HD TTS  -K 6.2 will plan for HD today vascular gave ok to use new graft placed overnight    2. Massive hemorrhage from AV fistula s/p emergent ligation  3. Hyperkalemia: adjust with HD today  4. Acute blood loss anemia  5. HTN: improving. Wean off cardene drip  6.thrombocytopenia  7.hx afib    Thank you for allowing me to participate in the care of this patient. Please feel free to call me if you have any questions.     Electronically signed by Hermila Clayton DO on 3/6/25 at 4:04 PM EST    [facility]

## 2025-03-06 NOTE — H&P
HISTORY AND PHYSICAL             Date: 3/6/2025        Patient Name: Trav Jennings     YOB: 1965      Age:  60 y.o.    Chief Complaint   No chief complaint on file.     ”Bleeding left upper extremity brachiocephalic fistula.”    History Obtained From   patient    History of Present Illness   The patient has a bleeding left upper extremity fistula which is a massive bleed.  He has a tourniquet on currently.  PERRL, conjunctiva normal    Past Medical History     Past Medical History:   Diagnosis Date    AAA (abdominal aortic aneurysm)     NURIS (acute kidney injury) 9/24/2015    Anemia associated with chronic renal failure     Arthritis     stated in hands    Cocaine abuse (HCC) 5/10/2014    Depression     Diabetes mellitus (HCC)     pt states he no longer has diabetes he has lost alot of davion.     FSGS (focal segmental glomerulosclerosis) 5/23/2013    Hemodialysis patient 10/17/2016    on hemodialysis with [facility]    Hemorrhoids 1/16/2012    History of blood transfusion     Hyperlipidemia     Hyperphosphatemia 5/21/2016    Hypertension     Left renal artery stenosis 5/22/2014    Monoclonal (M) protein disease, multiple 'M' protein     Nicotine dependence 6/16/2014    Noncompliance     Pneumonia     Psychiatric problem     PTSD (post-traumatic stress disorder)     Pt vet from [facility]    Secondary hyperparathyroidism (of renal origin)     Sleep apnea         Past Surgical History     Past Surgical History:   Procedure Laterality Date    ABDOMINAL AORTIC ANEURYSM REPAIR      BRONCHOSCOPY N/A 10/6/2022    BRONCHOSCOPY performed by Theodore Ng MD at [facility]    DIALYSIS FISTULA CREATION Left 07/08/2016    EKG 12-LEAD  9/24/2015         HAND TENDON SURGERY Left 4/5/2019    LEFT THUMB FLEXOR TENDON REPAIR performed by Edu Cervantes MD at [facility] OR    LEG TENDON SURGERY      [facility] US GUID NDL BIOPSY RIGHT Right 3/18/2024    [facility] US GUID NDL BIOPSY RIGHT 3/18/2024  Michele Lee MD [facility]    THORACOTOMY Right 10/10/2022    Right Thoracotomy & Decortication with Partial Lung Resection performed by Ralph Becerra MD at [facility] OR    VASCULAR SURGERY Left 07/08/2016    AV Fistula    VASCULAR SURGERY Right 1990    Surgery on Achilles tendon        Medications Prior to Admission     Prior to Admission medications    Medication Sig Start Date End Date Taking? Authorizing Provider   warfarin (COUMADIN) 7.5 MG tablet Take as directed by [facility] Medication Management 30 tabs = 30 DS 2/10/25   Demarco Cramer MD   atorvastatin (LIPITOR) 80 MG tablet Take 0.5 tablets by mouth daily    Jaime Young MD   sodium chloride 0.9 % SOLN 100 mL with iron sucrose 20 MG/ML SOLN Infuse intravenously Three times weekly with HD 9/24/24 11/11/25  Jaime Young MD   ondansetron (ZOFRAN) 4 MG tablet Take 1 tablet by mouth every 8 hours as needed for Nausea 5/3/24   Jaime Young MD   VELPHORO 500 MG CHEW chewable tablet Take 1 tablet by mouth 3 times daily (with meals) 11/27/24   Jaime Young MD   Multiple Vitamins-Minerals (MULTIVITAMIN ADULT, MINERALS,) TABS Take 1 tablet by mouth daily 4/7/24   Jaime Young MD   hydrOXYzine pamoate (VISTARIL) 50 MG capsule Take 1 capsule by mouth 3 times daily as needed for Anxiety 11/18/24   Jaime Young MD   fluticasone (FLONASE) 50 MCG/ACT nasal spray 1 spray by Nasal route daily 11/18/24   Jaime Young MD   clindamycin 1 % gel Apply topically 2 times daily 1/14/25   Jiame Young MD   vitamin D3 (CHOLECALCIFEROL) 125 MCG (5000 UT) TABS tablet Take 1 tablet by mouth daily 5/28/24   Jaime Young MD   cephALEXin (KEFLEX) 500 MG capsule Take 1 capsule by mouth 3 times daily 1/15/25   Jaime Young MD   Omega 3 1000 MG CAPS Take 1 capsule by mouth daily    Jaime Young MD   hydrALAZINE (APRESOLINE) 100 MG tablet Take 1 tablet by mouth every 8 (eight) hours 1/7/25  Cardiomegaly. Unchanged mediastinal contours. Status post endovascular stent graft in the thoracic aorta. No pneumothorax. Unchanged linear opacity in the right mid lung zone likely along the right minor fissure could be a small amount of fluid along the right minor fissure with adjacent atelectasis or scarring versus linear scarring. Unchanged opacity at the right lung base . Normal upper abdomen. No acute fracture.     Impression: Stable examination. Status post endovascular stent graft in the thoracic aorta. Chronic small right pleural effusion with associated scarring. This document has been electronically signed by: Gabbie Gonzalez MD on 03/05/2025 08:00 PM      Assessment        Plan   We will be going to the operating room urgently for repair of the fistula versus ligation of the fistula.  He understands and agrees.    Consultations Ordered:  IP CONSULT TO VASCULAR SURGERY    Electronically signed by Tony Garcia MD on 3/6/25 at 3:26 AM EST

## 2025-03-06 NOTE — ANESTHESIA PRE PROCEDURE
Department of Anesthesiology  Preprocedure Note       Name:  Trav Jennings   Age:  60 y.o.  :  1965                                          MRN:  [id number]         Date:  3/6/2025      Surgeon: Surgeon(s):  Tony Garcia MD    Procedure: Procedure(s):  ARTERIOVENOUS FISTULA CREATION    Medications prior to admission:   Prior to Admission medications    Medication Sig Start Date End Date Taking? Authorizing Provider   warfarin (COUMADIN) 7.5 MG tablet Take as directed by [facility] Medication Management 30 tabs = 30 DS 2/10/25   Demarco Cramer MD   atorvastatin (LIPITOR) 80 MG tablet Take 0.5 tablets by mouth daily    Jaime Young MD   sodium chloride 0.9 % SOLN 100 mL with iron sucrose 20 MG/ML SOLN Infuse intravenously Three times weekly with HD 24  Jaime Young MD   ondansetron (ZOFRAN) 4 MG tablet Take 1 tablet by mouth every 8 hours as needed for Nausea 5/3/24   Jaime Young MD   VELPHORO 500 MG CHEW chewable tablet Take 1 tablet by mouth 3 times daily (with meals) 24   Jaime Young MD   Multiple Vitamins-Minerals (MULTIVITAMIN ADULT, MINERALS,) TABS Take 1 tablet by mouth daily 24   Jaime Young MD   hydrOXYzine pamoate (VISTARIL) 50 MG capsule Take 1 capsule by mouth 3 times daily as needed for Anxiety 24   Jaime Young MD   fluticasone (FLONASE) 50 MCG/ACT nasal spray 1 spray by Nasal route daily 24   Jaime Young MD   clindamycin 1 % gel Apply topically 2 times daily 25   Jamie Young MD   vitamin D3 (CHOLECALCIFEROL) 125 MCG (5000 UT) TABS tablet Take 1 tablet by mouth daily 24   Jaime Young MD   cephALEXin (KEFLEX) 500 MG capsule Take 1 capsule by mouth 3 times daily 1/15/25   Jaime Young MD   Omega 3 1000 MG CAPS Take 1 capsule by mouth daily    Jiame Young MD   hydrALAZINE (APRESOLINE) 100 MG tablet Take 1 tablet by mouth every 8 (eight) hours

## 2025-03-07 ENCOUNTER — APPOINTMENT (OUTPATIENT)
Dept: GENERAL RADIOLOGY | Age: 60
DRG: 252 | End: 2025-03-07
Payer: MEDICARE

## 2025-03-07 LAB
ALBUMIN SERPL BCG-MCNC: 3.4 G/DL (ref 3.4–4.9)
ANION GAP SERPL CALC-SCNC: 11 MEQ/L (ref 8–16)
BASOPHILS ABSOLUTE: 0 THOU/MM3 (ref 0–0.1)
BASOPHILS NFR BLD AUTO: 0.6 %
BUN SERPL-MCNC: 33 MG/DL (ref 8–23)
CALCIUM SERPL-MCNC: 7.2 MG/DL (ref 8.6–10)
CHLORIDE SERPL-SCNC: 102 MEQ/L (ref 98–111)
CO2 SERPL-SCNC: 25 MEQ/L (ref 22–29)
CREAT SERPL-MCNC: 6.4 MG/DL (ref 0.7–1.2)
DEPRECATED RDW RBC AUTO: 55.2 FL (ref 35–45)
DEPRECATED RDW RBC AUTO: 56 FL (ref 35–45)
EOSINOPHIL NFR BLD AUTO: 2.7 %
EOSINOPHILS ABSOLUTE: 0.1 THOU/MM3 (ref 0–0.4)
ERYTHROCYTE [DISTWIDTH] IN BLOOD BY AUTOMATED COUNT: 14.1 % (ref 11.5–14.5)
ERYTHROCYTE [DISTWIDTH] IN BLOOD BY AUTOMATED COUNT: 14.1 % (ref 11.5–14.5)
GFR SERPL CREATININE-BSD FRML MDRD: 9 ML/MIN/1.73M2
GLUCOSE SERPL-MCNC: 114 MG/DL (ref 74–109)
HCT VFR BLD AUTO: 24.6 % (ref 42–52)
HCT VFR BLD AUTO: 26.6 % (ref 42–52)
HCT VFR BLD AUTO: 28.9 % (ref 42–52)
HGB BLD-MCNC: 7.5 GM/DL (ref 14–18)
HGB BLD-MCNC: 8.2 GM/DL (ref 14–18)
HGB BLD-MCNC: 8.7 GM/DL (ref 14–18)
IMM GRANULOCYTES # BLD AUTO: 0.01 THOU/MM3 (ref 0–0.07)
IMM GRANULOCYTES NFR BLD AUTO: 0.2 %
INR PPP: 1.29 (ref 0.85–1.13)
LYMPHOCYTES ABSOLUTE: 0.9 THOU/MM3 (ref 1–4.8)
LYMPHOCYTES NFR BLD AUTO: 16.8 %
MCH RBC QN AUTO: 32.8 PG (ref 26–33)
MCH RBC QN AUTO: 32.9 PG (ref 26–33)
MCHC RBC AUTO-ENTMCNC: 30.5 GM/DL (ref 32.2–35.5)
MCHC RBC AUTO-ENTMCNC: 30.8 GM/DL (ref 32.2–35.5)
MCV RBC AUTO: 106.8 FL (ref 80–94)
MCV RBC AUTO: 107.4 FL (ref 80–94)
MONOCYTES ABSOLUTE: 0.7 THOU/MM3 (ref 0.4–1.3)
MONOCYTES NFR BLD AUTO: 13.5 %
MRSA DNA SPEC QL NAA+PROBE: NEGATIVE
NEUTROPHILS ABSOLUTE: 3.4 THOU/MM3 (ref 1.8–7.7)
NEUTROPHILS NFR BLD AUTO: 66.2 %
NRBC BLD AUTO-RTO: 0 /100 WBC
PHOSPHATE SERPL-MCNC: 4.6 MG/DL (ref 2.5–4.5)
PLATELET # BLD AUTO: 102 THOU/MM3 (ref 130–400)
PLATELET # BLD AUTO: 106 THOU/MM3 (ref 130–400)
PMV BLD AUTO: 10 FL (ref 9.4–12.4)
PMV BLD AUTO: 9.9 FL (ref 9.4–12.4)
POTASSIUM SERPL-SCNC: 4.8 MEQ/L (ref 3.5–5.2)
RBC # BLD AUTO: 2.29 MILL/MM3 (ref 4.7–6.1)
RBC # BLD AUTO: 2.49 MILL/MM3 (ref 4.7–6.1)
SODIUM SERPL-SCNC: 138 MEQ/L (ref 135–145)
WBC # BLD AUTO: 5 THOU/MM3 (ref 4.8–10.8)
WBC # BLD AUTO: 5.1 THOU/MM3 (ref 4.8–10.8)

## 2025-03-07 PROCEDURE — 36430 TRANSFUSION BLD/BLD COMPNT: CPT

## 2025-03-07 PROCEDURE — 85025 COMPLETE CBC W/AUTO DIFF WBC: CPT

## 2025-03-07 PROCEDURE — 94640 AIRWAY INHALATION TREATMENT: CPT

## 2025-03-07 PROCEDURE — 6360000002 HC RX W HCPCS: Performed by: INTERNAL MEDICINE

## 2025-03-07 PROCEDURE — 6370000000 HC RX 637 (ALT 250 FOR IP): Performed by: PHARMACIST

## 2025-03-07 PROCEDURE — 6370000000 HC RX 637 (ALT 250 FOR IP): Performed by: PHYSICIAN ASSISTANT

## 2025-03-07 PROCEDURE — 2500000003 HC RX 250 WO HCPCS: Performed by: PHYSICIAN ASSISTANT

## 2025-03-07 PROCEDURE — 99233 SBSQ HOSP IP/OBS HIGH 50: CPT | Performed by: INTERNAL MEDICINE

## 2025-03-07 PROCEDURE — 99232 SBSQ HOSP IP/OBS MODERATE 35: CPT | Performed by: SURGERY

## 2025-03-07 PROCEDURE — 85027 COMPLETE CBC AUTOMATED: CPT

## 2025-03-07 PROCEDURE — 6370000000 HC RX 637 (ALT 250 FOR IP)

## 2025-03-07 PROCEDURE — 2100000000 HC CCU R&B

## 2025-03-07 PROCEDURE — 85014 HEMATOCRIT: CPT

## 2025-03-07 PROCEDURE — 36415 COLL VENOUS BLD VENIPUNCTURE: CPT

## 2025-03-07 PROCEDURE — 2700000000 HC OXYGEN THERAPY PER DAY

## 2025-03-07 PROCEDURE — 71045 X-RAY EXAM CHEST 1 VIEW: CPT

## 2025-03-07 PROCEDURE — 80069 RENAL FUNCTION PANEL: CPT

## 2025-03-07 PROCEDURE — 85610 PROTHROMBIN TIME: CPT

## 2025-03-07 PROCEDURE — 85018 HEMOGLOBIN: CPT

## 2025-03-07 PROCEDURE — 99232 SBSQ HOSP IP/OBS MODERATE 35: CPT | Performed by: INTERNAL MEDICINE

## 2025-03-07 RX ORDER — WARFARIN SODIUM 10 MG/1
10 TABLET ORAL ONCE
Status: COMPLETED | OUTPATIENT
Start: 2025-03-07 | End: 2025-03-07

## 2025-03-07 RX ORDER — ALBUTEROL SULFATE 90 UG/1
2 INHALANT RESPIRATORY (INHALATION)
Status: DISCONTINUED | OUTPATIENT
Start: 2025-03-07 | End: 2025-03-11 | Stop reason: HOSPADM

## 2025-03-07 RX ORDER — SODIUM CHLORIDE 9 MG/ML
INJECTION, SOLUTION INTRAVENOUS PRN
Status: DISCONTINUED | OUTPATIENT
Start: 2025-03-07 | End: 2025-03-08

## 2025-03-07 RX ADMIN — CINACALCET HYDROCHLORIDE 90 MG: 30 TABLET, FILM COATED ORAL at 07:52

## 2025-03-07 RX ADMIN — Medication 5000 UNITS: at 07:52

## 2025-03-07 RX ADMIN — SODIUM CHLORIDE, PRESERVATIVE FREE 10 ML: 5 INJECTION INTRAVENOUS at 21:56

## 2025-03-07 RX ADMIN — ISOSORBIDE MONONITRATE 120 MG: 60 TABLET, EXTENDED RELEASE ORAL at 21:56

## 2025-03-07 RX ADMIN — CLONIDINE HYDROCHLORIDE 0.3 MG: 0.2 TABLET ORAL at 21:55

## 2025-03-07 RX ADMIN — EPOETIN ALFA-EPBX 6000 UNITS: 4000 INJECTION, SOLUTION INTRAVENOUS; SUBCUTANEOUS at 18:41

## 2025-03-07 RX ADMIN — HYDRALAZINE HYDROCHLORIDE 100 MG: 50 TABLET ORAL at 20:25

## 2025-03-07 RX ADMIN — WARFARIN SODIUM 10 MG: 10 TABLET ORAL at 18:41

## 2025-03-07 RX ADMIN — Medication 1 TABLET: at 07:52

## 2025-03-07 RX ADMIN — CALCITRIOL CAPSULES 0.25 MCG 0.5 MCG: 0.25 CAPSULE ORAL at 07:50

## 2025-03-07 RX ADMIN — ALBUTEROL SULFATE 2 PUFF: 90 AEROSOL, METERED RESPIRATORY (INHALATION) at 20:03

## 2025-03-07 RX ADMIN — OXYCODONE HYDROCHLORIDE 10 MG: 5 TABLET ORAL at 11:48

## 2025-03-07 RX ADMIN — SODIUM CHLORIDE, PRESERVATIVE FREE 10 ML: 5 INJECTION INTRAVENOUS at 07:52

## 2025-03-07 RX ADMIN — AMLODIPINE BESYLATE 5 MG: 5 TABLET ORAL at 07:51

## 2025-03-07 RX ADMIN — ATORVASTATIN CALCIUM 40 MG: 40 TABLET, FILM COATED ORAL at 21:56

## 2025-03-07 RX ADMIN — LABETALOL HYDROCHLORIDE 100 MG: 100 TABLET, FILM COATED ORAL at 05:16

## 2025-03-07 RX ADMIN — OXYCODONE HYDROCHLORIDE 10 MG: 5 TABLET ORAL at 05:24

## 2025-03-07 RX ADMIN — Medication 100 MG: at 07:52

## 2025-03-07 RX ADMIN — OXYCODONE HYDROCHLORIDE 10 MG: 5 TABLET ORAL at 18:44

## 2025-03-07 RX ADMIN — HYDRALAZINE HYDROCHLORIDE 100 MG: 50 TABLET ORAL at 05:16

## 2025-03-07 RX ADMIN — ISOSORBIDE MONONITRATE 120 MG: 60 TABLET, EXTENDED RELEASE ORAL at 07:52

## 2025-03-07 RX ADMIN — SERTRALINE 150 MG: 100 TABLET, FILM COATED ORAL at 07:51

## 2025-03-07 RX ADMIN — CLONIDINE HYDROCHLORIDE 0.3 MG: 0.2 TABLET ORAL at 07:51

## 2025-03-07 RX ADMIN — LABETALOL HYDROCHLORIDE 100 MG: 100 TABLET, FILM COATED ORAL at 22:03

## 2025-03-07 RX ADMIN — AMIODARONE HYDROCHLORIDE 200 MG: 200 TABLET ORAL at 07:51

## 2025-03-07 RX ADMIN — OXYCODONE HYDROCHLORIDE 10 MG: 5 TABLET ORAL at 01:15

## 2025-03-07 ASSESSMENT — PAIN SCALES - GENERAL
PAINLEVEL_OUTOF10: 8
PAINLEVEL_OUTOF10: 7
PAINLEVEL_OUTOF10: 7
PAINLEVEL_OUTOF10: 8
PAINLEVEL_OUTOF10: 0
PAINLEVEL_OUTOF10: 6
PAINLEVEL_OUTOF10: 0

## 2025-03-07 ASSESSMENT — PAIN DESCRIPTION - DESCRIPTORS
DESCRIPTORS: BURNING;STABBING
DESCRIPTORS: ACHING;PRESSURE;SORE
DESCRIPTORS: ACHING;BURNING
DESCRIPTORS: ACHING;PRESSURE
DESCRIPTORS: ACHING;BURNING;STABBING

## 2025-03-07 ASSESSMENT — PAIN DESCRIPTION - LOCATION
LOCATION: ARM

## 2025-03-07 ASSESSMENT — PAIN DESCRIPTION - ORIENTATION
ORIENTATION: LEFT
ORIENTATION: RIGHT
ORIENTATION: RIGHT
ORIENTATION: LEFT

## 2025-03-07 NOTE — CONSULTS
CRITICAL CARE CONSULT NOTE      Patient:  Trav Jennings    Unit/Bed:3A-01/001-A  YOB: 1965  MRN: [id number]   PCP: Radha Hou APRN - CNP  Date of Admission: 3/6/2025  Chief Complaint:-AV fistula hemorrhage    Assessment and Plan:    AV fistula hemorrhage status post emergent ligation: Patient with emergent ligation, construction of regular cephalic vein graft with Dr. Garcia 3/7/2025. New graft functioning, available for use immediately. Patient presented with hemorrhage from left upper extremity fistula.  Hypotension: Likely secondary to blood loss.  Patient with history of uncontrolled hypertension, was on Cardene drip initially. Patient mildly hypotensive with blood pressure reading of 91/43, MAP of 59.  Cardene drip turned off. Will transfuse 2 units of blood. Continue to monitor blood pressure. Will start Levophed if needed.  ESRD on HD: Patient on Tuesday, Thursday, Saturday schedule.  Patient able to get dialysis 3/7/2025 after AV fistula ligation, reconstruction.  Plan for hemodialysis 3/8/2025  Acute on chronic anemia: 2/2 AV fistula hemorrhage. Chronically due to end-stage renal disease.  Baseline hemoglobin of 10-11. Patient presented with hemoglobin of 9.7. Dropped to 7.5 in the afternoon. Patient transfused with 2 units of blood.  On Retacrit 6000 units every Monday, Wednesday, Friday.  Paroxysmal atrial flutter: [id number] score of  4.  Patient on amiodarone 200 mg daily.  On warfarin.  Hyperkalemia, resolved: Presented with potassium level of 6.2.  Patient had hemodialysis 3/7/2025.  Repeat potassium level of 4.8.  History of uncontrolled hypertension: Patient has history of uncontrolled hypertension.  On amlodipine, clonidine, hydralazine, Imdur, labetalol at home.  Was placed on Cardene drip initially given hypertensive.  Cardene drip weaned off given patient hypotensive.  Will continue to monitor.  Expect blood pressure to improve with blood transfusion.  Alcohol use:  Infusions:   sodium chloride      niCARdipine 11.5 mg/hr (03/06/25 1208)    sodium chloride      nitroGLYCERIN      niCARdipine Stopped (03/06/25 1338)    norepinephrine         PHYSICAL EXAMINATION:  T:  98.3.  P:  78. RR:  13. B/P:  94/38.  FiO2:  32%. O2 Sat:  99%.  I/O:  +1166.8, -2500  Body mass index is 26.67 kg/m².   GCS:   15  General:   Acutely ill appearing  HEENT:  normocephalic and atraumatic.  No scleral icterus. PERR  Neck: supple.  No Thyromegaly.  Lungs: clear to auscultation.  No retractions  Cardiac: RRR.  No JVD.  Abdomen: soft.  Nontender.  Extremities:  No clubbing, cyanosis, or edema x 4.  Left upper extremity fistula  Vasculature: capillary refill < 3 seconds. Palpable dorsalis pedis pulses.  Skin:  warm and dry.  Psych:  Alert and oriented x3.  Affect appropriate  Lymph:  No supraclavicular adenopathy.  Neurologic:  No focal deficit. No seizures.    Data: (All radiographs, tracings, PFTs, and imaging are personally viewed and interpreted unless otherwise noted).   Sodium 138, potassium 4.8, chloride 102, bicarb 25, BUN 33, creatinine 6.4, glucose 114.  WBC 5.1, Hgb 7.5, HCT 24.6,   Telemetry shows sinus rhythm  CXR 3/7 showed mild pulmonary vascular congestion, cardiomegaly      Seen with multidisciplinary ICU team .  Meets Continued ICU Level Care Criteria:    [x] Yes   [] No - Transfer Planned to listed location:  [] HOSPITALIST CONTACTED-      Case and plan discussed with Dr. Ng.      Electronically signed by Juan Spring DO   involved with patient care by the CC team.  Electronically signed by Theodore Ng MD.

## 2025-03-07 NOTE — PROGRESS NOTES
03/06/25 1338    norepinephrine (LEVOPHED) 16 mg in sodium chloride 0.9 % 250 mL infusion (premix)  1-100 mcg/min IntraVENous Continuous PRN Symone Gómez PA        oxyCODONE (ROXICODONE) immediate release tablet 5 mg  5 mg Oral Q4H PRN Symone Gómez PA        Or    oxyCODONE (ROXICODONE) immediate release tablet 10 mg  10 mg Oral Q4H PRN Symone Gómez PA   10 mg at 03/07/25 0524    ondansetron (ZOFRAN) injection 4 mg  4 mg IntraVENous Q6H PRN Symone Gómez PA        albuterol sulfate HFA (PROVENTIL;VENTOLIN;PROAIR) 108 (90 Base) MCG/ACT inhaler 2 puff  2 puff Inhalation Q6H PRN Marco A Keys PA-C        amiodarone (CORDARONE) tablet 200 mg  200 mg Oral Daily Marco A Keys PA-C   200 mg at 03/07/25 0751    amLODIPine (NORVASC) tablet 5 mg  5 mg Oral Daily Marco A Keys PA-C   5 mg at 03/07/25 0751    [Held by provider] aspirin EC tablet 81 mg  81 mg Oral Daily Marco A Keys PA-C        atorvastatin (LIPITOR) tablet 40 mg  40 mg Oral Daily Marco A Keys PA-C   40 mg at 03/06/25 2006    calcitRIOL (ROCALTROL) capsule 0.5 mcg  0.5 mcg Oral Once per day on Monday Wednesday Friday Marco A Keys PA-C   0.5 mcg at 03/07/25 0750    [Held by provider] cinacalcet (SENSIPAR) tablet 90 mg  90 mg Oral Once per day on Monday Wednesday Friday Marco A Keys PA-C   90 mg at 03/07/25 0752    cloNIDine (CATAPRES) tablet 0.3 mg  0.3 mg Oral TID Marco A Keys PA-C   0.3 mg at 03/07/25 0751    hydrALAZINE (APRESOLINE) tablet 100 mg  100 mg Oral q8h Marco A Keys PA-C   100 mg at 03/07/25 0516    hydrOXYzine pamoate (VISTARIL) capsule 50 mg  50 mg Oral TID PRN Marco A Keys PA-C        isosorbide mononitrate (IMDUR) extended release tablet 120 mg  120 mg Oral BID Marco A Keys PA-C   120 mg at 03/07/25 0752    labetalol (NORMODYNE) tablet 100 mg  100 mg Oral 3 times per day Marco A Keys PA-C   100 mg at 03/07/25 0516    multivitamin 1 tablet  1 tablet Oral Daily Marco A Keys PA-C   1 tablet at 03/07/25 0752    ondansetron (ZOFRAN)      MCHC   Date Value Ref Range Status   03/07/2025 30.8 (L) 32.2 - 35.5 gm/dl Final     RDW   Date Value Ref Range Status   04/03/2018 15.0 (H) 11.5 - 14.5 % Final     Platelets   Date Value Ref Range Status   03/07/2025 102 (L) 130 - 400 thou/mm3 Final     MPV   Date Value Ref Range Status   03/07/2025 9.9 9.4 - 12.4 fL Final     Comment:     Performed at [facility] [place] [address]        Magnesium   Date Value Ref Range Status   03/06/2025 2.4 1.6 - 2.6 mg/dL Final     Comment:     [place] [address]        Heart: RRR (not in Afib)  Lungs: crackles bases  Abd: soft, NT  MSK: left arm with dressings on incisions. Thrill in LUE AV graft. There is another pseudoaneurysm present that is thrombosed s/p bypass. Left hand without deficits. Radial signal at wrist.      A/P: 59 yo male with a bleeding left upper extremity brachiocephalic AV fistula due to a pseudoaneurysm s/p:    3/6/25 Jose  1. Ligation left upper extremity brachiocephalic fistula.  2.  Excision of aneurysmal portion of left upper extremity AV fistula.  3.  Construction of a brachial to cephalic vein graft using Flixene thick walled graft.          - OOB, ambulate  - HTN control per nephrology. Consult hospitalist for care  - Hold aspirin due to low platelet count. I would resume warfarin only slowly due to his recent bleeding.   - PT, OT  - Needs to dialyze again tomorrow. If issues accessing the graft this early, he will need a catheter. We will follow along.   - Hopefully discharge by Monday. Discussed with the patient. Pain control as ordered.     Total time spent managing and seeing the patient was 35 minutes

## 2025-03-07 NOTE — PROGRESS NOTES
Warfarin Pharmacy Consult Note    **Please contact pharmacist for discharge instructions**  Warfarin Indication: Atrial fibrillation/Atrial flutter  Target INR: 2.0-3.0  Dose prior to admission: 7.5mg daily      Recent Labs     03/06/25  0645 03/06/25  0907 03/07/25  0530   HGB 7.1* 9.3* 8.2*   PLT 81* 93* 102*     Recent Labs     03/05/25  1906 03/06/25  0239 03/07/25  0530   INR 1.95* 1.80* 1.29*     Concurrent anticoagulants/antiplatelets: Aspirin (on hold)  Significant warfarin drug-drug interactions: Amiodarone, Sertraline     Date INR Warfarin Dose   3/6/2025 1.8 7.5 mg   3/7/25   1.29 10 mg                                                Monitoring:                   INR will be monitored daily.    Gissel GUEVARAPh., BCPS., 3/7/2025,12:37 PM

## 2025-03-07 NOTE — PROGRESS NOTES
[facility]  OCCUPATIONAL THERAPY MISSED TREATMENT NOTE  [facility] CCU 3A  3A-01/001-A      Date: 3/7/2025  Patient Name: Trav Jennings        CSN: [id number]   : 1965  (60 y.o.)  Gender: male                REASON FOR MISSED TREATMENT:  OT attempted at this time. Pt hypotensive in addition to will be receiving blood soon. Will hold at this time and check back as able

## 2025-03-07 NOTE — PLAN OF CARE
Problem: Chronic Conditions and Co-morbidities  Goal: Patient's chronic conditions and co-morbidity symptoms are monitored and maintained or improved  3/7/2025 0907 by Yue Estrada RN  Outcome: Progressing     Problem: Discharge Planning  Goal: Discharge to home or other facility with appropriate resources  3/7/2025 0907 by Yue Estrada RN  Outcome: Progressing     Problem: Skin/Tissue Integrity  Goal: Skin integrity remains intact  Description: 1.  Monitor for areas of redness and/or skin breakdown  2.  Assess vascular access sites hourly  3.  Every 4-6 hours minimum:  Change oxygen saturation probe site  4.  Every 4-6 hours:  If on nasal continuous positive airway pressure, respiratory therapy assess nares and determine need for appliance change or resting period  3/7/2025 0907 by Yue Estrada RN  Outcome: Progressing     Problem: Safety - Adult  Goal: Free from fall injury  3/7/2025 0907 by Yue Estrada RN  Outcome: Progressing     Problem: ABCDS Injury Assessment  Goal: Absence of physical injury  3/7/2025 0907 by Yue Estrada RN  Outcome: Progressing

## 2025-03-07 NOTE — PLAN OF CARE
Problem: Chronic Conditions and Co-morbidities  Goal: Patient's chronic conditions and co-morbidity symptoms are monitored and maintained or improved  Outcome: Progressing  Flowsheets (Taken 3/6/2025 1930)  Care Plan - Patient's Chronic Conditions and Co-Morbidity Symptoms are Monitored and Maintained or Improved: Monitor and assess patient's chronic conditions and comorbid symptoms for stability, deterioration, or improvement     Problem: Skin/Tissue Integrity  Goal: Skin integrity remains intact  Description: 1.  Monitor for areas of redness and/or skin breakdown  2.  Assess vascular access sites hourly  3.  Every 4-6 hours minimum:  Change oxygen saturation probe site  4.  Every 4-6 hours:  If on nasal continuous positive airway pressure, respiratory therapy assess nares and determine need for appliance change or resting period  Outcome: Progressing  Flowsheets (Taken 3/6/2025 1930)  Skin Integrity Remains Intact: Monitor for areas of redness and/or skin breakdown    Problem: ABCDS Injury Assessment  Goal: Absence of physical injury  Outcome: Progressing        Problem: Safety - Adult  Goal: Free from fall injury  Outcome: Progressing

## 2025-03-07 NOTE — RT PROTOCOL NOTE
RT Inhaler-Nebulizer Bronchodilator Protocol Note    There is a bronchodilator order in the chart from a provider indicating to follow the RT Bronchodilator Protocol and there is an “Initiate RT Inhaler-Nebulizer Bronchodilator Protocol” order as well (see protocol at bottom of note).    CXR Findings:  XR CHEST PORTABLE    Result Date: 3/7/2025  Mild pulmonary vascular congestion. Small right pleural effusion. Cardiomegaly. This document has been electronically signed by: Tony Alexandre MD on 03/07/2025 03:26 AM      The findings from the last RT Protocol Assessment were as follows:   History Pulmonary Disease: Smoker 15 pack years or more  Respiratory Pattern: Regular pattern and RR 12-20 bpm  Breath Sounds: Slightly diminished and/or crackles  Cough: Strong, spontaneous, non-productive  Indication for Bronchodilator Therapy: Decreased or absent breath sounds (patient takes bid at home)  Bronchodilator Assessment Score: 3    Aerosolized bronchodilator medication orders have been revised according to the RT Inhaler-Nebulizer Bronchodilator Protocol below.    Respiratory Therapist to perform RT Therapy Protocol Assessment initially then follow the protocol.  Repeat RT Therapy Protocol Assessment PRN for score 0-3 or on second treatment, BID, and PRN for scores above 3.    No Indications - adjust the frequency to every 6 hours PRN wheezing or bronchospasm, if no treatments needed after 48 hours then discontinue using Per Protocol order mode.     If indication present, adjust the RT bronchodilator orders based on the Bronchodilator Assessment Score as indicated below.  Use Inhaler orders unless patient has one or more of the following: on home nebulizer, not able to hold breath for 10 seconds, is not alert and oriented, cannot activate and use MDI correctly, or respiratory rate 25 breaths per minute or more, then use the equivalent nebulizer order(s) with same Frequency and PRN reasons based on the score.  If a patient is  on this medication at home then do not decrease Frequency below that used at home.    0-3 - enter or revise RT bronchodilator order(s) to equivalent RT Bronchodilator order with Frequency of every 4 hours PRN for wheezing or increased work of breathing using Per Protocol order mode.        4-6 - enter or revise RT Bronchodilator order(s) to two equivalent RT bronchodilator orders with one order with BID Frequency and one order with Frequency of every 4 hours PRN wheezing or increased work of breathing using Per Protocol order mode.        7-10 - enter or revise RT Bronchodilator order(s) to two equivalent RT bronchodilator orders with one order with TID Frequency and one order with Frequency of every 4 hours PRN wheezing or increased work of breathing using Per Protocol order mode.       11-13 - enter or revise RT Bronchodilator order(s) to one equivalent RT bronchodilator order with QID Frequency and an Albuterol order with Frequency of every 4 hours PRN wheezing or increased work of breathing using Per Protocol order mode.      Greater than 13 - enter or revise RT Bronchodilator order(s) to one equivalent RT bronchodilator order with every 4 hours Frequency and an Albuterol order with Frequency of every 2 hours PRN wheezing or increased work of breathing using Per Protocol order mode.     RT to enter RT Home Evaluation for COPD & MDI Assessment order using Per Protocol order mode.    Electronically signed by Myrna Bhardwaj RCP on 3/7/2025 at 2:25 PM

## 2025-03-07 NOTE — PROGRESS NOTES
[facility]   Nephrology progress note  3/7/2025, 9:33 AM      Pt Name:    Trav Jennings  MRN:     [id number]     YOB: 1965  Admit Date:    3/6/2025  2:31 AM    Chief Complaint: Nephrology following for ESRD.    Subjective:  Patient was seen and examined this morning  Doing ok. HD went well yesterday.   Bp stable.    Objective:  24HR INTAKE/OUTPUT:    Intake/Output Summary (Last 24 hours) at 3/7/2025 0933  Last data filed at 3/6/2025 2350  Gross per 24 hour   Intake 300 ml   Output 2500 ml   Net -2200 ml         I/O last 3 completed shifts:  In: 1176.8 [I.V.:861.9; IV Piggyback:14.9]  Out: 2500   No intake/output data recorded.   Admission weight: 99.8 kg (220 lb 0.3 oz)  Wt Readings from Last 3 Encounters:   03/06/25 96.8 kg (213 lb 6.5 oz)   03/05/25 102.1 kg (225 lb)   01/27/25 100.8 kg (222 lb 3.2 oz)        Vitals :   Vitals:    03/07/25 0530 03/07/25 0600 03/07/25 0730 03/07/25 0745   BP: 128/73 (!) 121/58 (!) 140/77 (!) 143/59   Pulse: 85 83 81 79   Resp: 16 13 18 13   Temp:   98.2 °F (36.8 °C)    TempSrc:   Oral    SpO2:   98%    Weight:           Physical examination  General Appearance: alert and cooperative with exam, appears comfortable, no distress  Mouth/Throat: Oral mucosa moist  Neck: No JVD  Lungs: Air entry B/L, no rales, no use of accessory muscles  Heart:  S1, S2 heard  GI: soft, non-tender, no guarding  Extremities: no LE edema    Medications:  Infusion:    niCARdipine 11.5 mg/hr (03/06/25 1208)    sodium chloride      nitroGLYCERIN      niCARdipine Stopped (03/06/25 1338)    norepinephrine       Meds:    epoetin qiana-epbx  6,000 Units SubCUTAneous Once per day on Monday Wednesday Friday    sodium chloride flush  5-40 mL IntraVENous 2 times per day    amiodarone  200 mg Oral Daily    amLODIPine  5 mg Oral Daily    [Held by provider] aspirin  81 mg Oral Daily    atorvastatin  40 mg Oral Daily    calcitRIOL  0.5 mcg Oral Once per day on Monday Wednesday Friday     [Held by provider] cinacalcet  90 mg Oral Once per day on Monday Wednesday Friday    cloNIDine  0.3 mg Oral TID    hydrALAZINE  100 mg Oral q8h    isosorbide mononitrate  120 mg Oral BID    labetalol  100 mg Oral 3 times per day    multivitamin  1 tablet Oral Daily    sertraline  150 mg Oral Daily    thiamine  100 mg Oral Daily    Vitamin D  5,000 Units Oral Daily    warfarin placeholder: dosing by pharmacy   Oral RX Placeholder     Meds prn: sodium chloride flush, sodium chloride, labetalol, hydrALAZINE, nitroGLYCERIN, niCARdipine, norepinephrine, oxyCODONE **OR** oxyCODONE, ondansetron, albuterol sulfate HFA, hydrOXYzine pamoate, ondansetron, traZODone     Lab Data :  CBC:   Recent Labs     03/06/25  0645 03/06/25  0907 03/07/25  0530   WBC 4.8 4.9 5.0   HGB 7.1* 9.3* 8.2*   HCT 23.1* 31.1* 26.6*   PLT 81* 93* 102*     CMP:  Recent Labs     03/05/25  1906 03/06/25  0239 03/06/25  0645 03/07/25  0530    141 142 138   K 3.9 5.0 6.2* 4.8    100 104 102   CO2 26 25 26 25   BUN 54* 62* 64* 33*   CREATININE 7.4* 8.4* 8.8* 6.4*   GLUCOSE 125* 118* 117* 114*   CALCIUM 8.6 7.9* 7.2* 7.2*   MG 2.4 2.4  --   --    PHOS 2.7  --   --   --      Hepatic:   Recent Labs     03/06/25  0239 03/06/25  0645   AST 18 17   ALT 14 9*   BILITOT 0.3 0.2*   ALKPHOS 105 87         Assessment and Plan:  ESRD on HD TTS  Stable -HD tomorrow  Hyperkalemia, resolved  Hypocalcemia: hold sensipar for now and monitor. Might need dose reduction if has been running low as outpatient  massive AV Fistula hemorrhage s/p emergent ligation. Now has AVG which is ok to use  HTN: improved  Anemia: add KENYETTA  Afib  thrombocytopenia      D/W patient     Hermila E Hemmelgarn, DO  [facility]    This report has been created using voice recognition software. It may contain minor errors which are inherent in voice recognition technology

## 2025-03-07 NOTE — CARE COORDINATION
Case Management Assessment Initial Evaluation    Date/Time of Evaluation: 3/7/2025 8:53 AM  Assessment Completed by: Stephanie Marroquin RN    If patient is discharged prior to next notation, then this note serves as note for discharge by case management.    Patient Name: Trav Jain                   YOB: 1965  Diagnosis: Excessive bleeding [R58]  S/P arteriovenous (AV) fistula repair [Z98.890, Z86.79]                   Date / Time: 3/6/2025  2:31 AM  Location: [facility]     Patient Admission Status: Inpatient   Readmission Risk Low 0-14, Mod 15-19), High > 20: Readmission Risk Score: 41.3    Current PCP: Radha Hou APRN - CNP  Health Care Decision Makers:   Primary Decision Maker: UZIEL JAIN - Spouse - [phone]    Additional Case Management Notes: Presented to ED with c/o massive bleeding from his left arm AV fistula which started at home. Pt was taken emergently to OR for ligation of fistula and repair. Noted that the new graft can be used immediately for dialysis. Nephrology consulted. Admitted to . Hypertensive and started on cardene drip; weaned off yesterday afternoon. HD yesterday with 2.2L removed.     Afebrile. NSR. Sats 96% on 3L O2. Ox4. PT/OT. Telemetry, n/v checks, wound care, SCDs. Prn roxicodone, coumadin - pharmacy dosing. Received Mag replacement yesterday. Rapid flu & COVID 19 were negative. K+ 3.9 - 6.2 - now 4.8, BUN 54 - now 33, Creat 7.4 - 8.8 - now 6.4, NT pro-bnp 98317, trop 116, hgb 9.7 - now 8.2, plt 102, INR 1.95 - now 1.29.     Procedures:   3/6 Ligation left upper extremity brachiocephalic fistula; Excision of aneurysmal portion of left upper extremity AV fistula; Construction of a brachial to cephalic vein graft using Flixene thick walled graft.    Imaging:   3/7 CXR: Mild pulmonary vascular congestion.  Small right pleural effusion.  Cardiomegaly.    Patient Goals/Plan/Treatment Preferences: Home w/wife. Follows with  [facility]. Wears 3-4 L O2  ATC - purchased himself. HD T/R/Sa at [facility] with 0655 chair time; [facility] transports for HD. Declines HH. Will need ride at discharge.      03/07/25 1340   Service Assessment   Patient Orientation Alert and Oriented   Cognition Alert   History Provided By Patient   Primary Caregiver Self   Accompanied By/Relationship n/a   Support Systems Spouse/Significant Other   Patient's Healthcare Decision Maker is: Patient Declined (Legal Next of Kin Remains as Decision Maker)   PCP Verified by CM Yes   Prior Functional Level Independent in ADLs/IADLs   Current Functional Level Independent in ADLs/IADLs   Can patient return to prior living arrangement Yes   Ability to make needs known: Good   Family able to assist with home care needs: Yes   Would you like for me to discuss the discharge plan with any other family members/significant others, and if so, who? No   Financial Resources Medicare;Medicaid   Community Resources Other (Comment)  ([facility]; HD at [facility] T/R/Sa with 0655 chair time - [facility] transport for HD.)   Social/Functional History   Active  Yes  ([facility]S transports to dialysis)   Discharge Planning   Type of Residence Apartment   Living Arrangements Spouse/Significant Other   Current Services Prior To Admission Oxygen Therapy;Other (Comment)  (Wears 3-4 L O2 ATC - states he purchased the oxygen himself; HD T/R/Sa at [facility] - 0655 chair time)   Potential Assistance Needed N/A   DME Ordered? No   Potential Assistance Purchasing Medications No   Type of Home Care Services None   Patient expects to be discharged to: Apartment   Services At/After Discharge   Confirm Follow Up Transport Other (see comment)  (He states he will need a ride at discharge)   Condition of Participation: Discharge Planning   The Plan for Transition of Care is related to the following treatment goals: \"Pain control in arm, and start feeling better.\"

## 2025-03-07 NOTE — PROGRESS NOTES
[facility]  PHYSICAL THERAPY MISSED TREATMENT NOTE  [facility] CCU 3A    Date: 3/7/2025  Patient Name: Trav Jennings        MRN: [id number]   : 1965  (60 y.o.)  Gender: male                REASON FOR MISSED TREATMENT:  RN reports pt to be getting blood soon and has been having BP levels that are nearly requiring Levophed. Will defer and check back tomorrow.      Lou Irene, MPT [id number]

## 2025-03-07 NOTE — CONSENT
Informed Consent for Blood Component Transfusion Note    I have discussed with the patient the rationale for blood component transfusion; its benefits in treating or preventing fatigue, organ damage, or death; and its risk which includes mild transfusion reactions, rare risk of blood borne infection, or more serious but rare reactions. I have discussed the alternatives to transfusion, including the risk and consequences of not receiving transfusion. The patient had an opportunity to ask questions and had agreed to proceed with transfusion of blood components.    Electronically signed by Juan Srping DO on 3/7/25 at 1:15 PM EST

## 2025-03-08 LAB
ALBUMIN SERPL BCG-MCNC: 3.3 G/DL (ref 3.4–4.9)
ANION GAP SERPL CALC-SCNC: 11 MEQ/L (ref 8–16)
BUN SERPL-MCNC: 48 MG/DL (ref 8–23)
CALCIUM SERPL-MCNC: 7.4 MG/DL (ref 8.6–10)
CHLORIDE SERPL-SCNC: 102 MEQ/L (ref 98–111)
CO2 SERPL-SCNC: 25 MEQ/L (ref 22–29)
CREAT SERPL-MCNC: 8.3 MG/DL (ref 0.7–1.2)
DEPRECATED RDW RBC AUTO: 61 FL (ref 35–45)
ERYTHROCYTE [DISTWIDTH] IN BLOOD BY AUTOMATED COUNT: 16.5 % (ref 11.5–14.5)
GFR SERPL CREATININE-BSD FRML MDRD: 7 ML/MIN/1.73M2
GLUCOSE SERPL-MCNC: 101 MG/DL (ref 74–109)
HCT VFR BLD AUTO: 27.7 % (ref 42–52)
HGB BLD-MCNC: 8.7 GM/DL (ref 14–18)
INR PPP: 1.3 (ref 0.85–1.13)
MCH RBC QN AUTO: 32 PG (ref 26–33)
MCHC RBC AUTO-ENTMCNC: 31.4 GM/DL (ref 32.2–35.5)
MCV RBC AUTO: 101.8 FL (ref 80–94)
PHOSPHATE SERPL-MCNC: 5.5 MG/DL (ref 2.5–4.5)
PLATELET # BLD AUTO: 86 THOU/MM3 (ref 130–400)
PMV BLD AUTO: 10.1 FL (ref 9.4–12.4)
POTASSIUM SERPL-SCNC: 5.4 MEQ/L (ref 3.5–5.2)
RBC # BLD AUTO: 2.72 MILL/MM3 (ref 4.7–6.1)
SODIUM SERPL-SCNC: 138 MEQ/L (ref 135–145)
WBC # BLD AUTO: 5.3 THOU/MM3 (ref 4.8–10.8)

## 2025-03-08 PROCEDURE — 36415 COLL VENOUS BLD VENIPUNCTURE: CPT

## 2025-03-08 PROCEDURE — 6370000000 HC RX 637 (ALT 250 FOR IP): Performed by: PHYSICIAN ASSISTANT

## 2025-03-08 PROCEDURE — 94761 N-INVAS EAR/PLS OXIMETRY MLT: CPT

## 2025-03-08 PROCEDURE — 2700000000 HC OXYGEN THERAPY PER DAY

## 2025-03-08 PROCEDURE — 99233 SBSQ HOSP IP/OBS HIGH 50: CPT | Performed by: INTERNAL MEDICINE

## 2025-03-08 PROCEDURE — 85027 COMPLETE CBC AUTOMATED: CPT

## 2025-03-08 PROCEDURE — 2060000000 HC ICU INTERMEDIATE R&B

## 2025-03-08 PROCEDURE — 85610 PROTHROMBIN TIME: CPT

## 2025-03-08 PROCEDURE — 2500000003 HC RX 250 WO HCPCS: Performed by: PHYSICIAN ASSISTANT

## 2025-03-08 PROCEDURE — 99232 SBSQ HOSP IP/OBS MODERATE 35: CPT | Performed by: SURGERY

## 2025-03-08 PROCEDURE — 90935 HEMODIALYSIS ONE EVALUATION: CPT

## 2025-03-08 PROCEDURE — 90935 HEMODIALYSIS ONE EVALUATION: CPT | Performed by: INTERNAL MEDICINE

## 2025-03-08 PROCEDURE — 6370000000 HC RX 637 (ALT 250 FOR IP)

## 2025-03-08 PROCEDURE — 94640 AIRWAY INHALATION TREATMENT: CPT

## 2025-03-08 PROCEDURE — 80069 RENAL FUNCTION PANEL: CPT

## 2025-03-08 RX ORDER — FOLIC ACID 1 MG/1
1 TABLET ORAL DAILY
Status: DISCONTINUED | OUTPATIENT
Start: 2025-03-08 | End: 2025-03-11 | Stop reason: HOSPADM

## 2025-03-08 RX ADMIN — LABETALOL HYDROCHLORIDE 100 MG: 100 TABLET, FILM COATED ORAL at 21:28

## 2025-03-08 RX ADMIN — HYDRALAZINE HYDROCHLORIDE 100 MG: 50 TABLET ORAL at 19:51

## 2025-03-08 RX ADMIN — SODIUM CHLORIDE, PRESERVATIVE FREE 10 ML: 5 INJECTION INTRAVENOUS at 12:39

## 2025-03-08 RX ADMIN — ATORVASTATIN CALCIUM 40 MG: 40 TABLET, FILM COATED ORAL at 19:50

## 2025-03-08 RX ADMIN — FOLIC ACID 1 MG: 1 TABLET ORAL at 15:19

## 2025-03-08 RX ADMIN — Medication 1 TABLET: at 12:37

## 2025-03-08 RX ADMIN — HYDRALAZINE HYDROCHLORIDE 100 MG: 50 TABLET ORAL at 04:38

## 2025-03-08 RX ADMIN — ALBUTEROL SULFATE 2 PUFF: 90 AEROSOL, METERED RESPIRATORY (INHALATION) at 07:54

## 2025-03-08 RX ADMIN — Medication 100 MG: at 12:37

## 2025-03-08 RX ADMIN — HYDRALAZINE HYDROCHLORIDE 100 MG: 50 TABLET ORAL at 12:37

## 2025-03-08 RX ADMIN — AMIODARONE HYDROCHLORIDE 200 MG: 200 TABLET ORAL at 12:37

## 2025-03-08 RX ADMIN — ALBUTEROL SULFATE 2 PUFF: 90 AEROSOL, METERED RESPIRATORY (INHALATION) at 20:18

## 2025-03-08 RX ADMIN — SERTRALINE 150 MG: 100 TABLET, FILM COATED ORAL at 12:37

## 2025-03-08 RX ADMIN — OXYCODONE HYDROCHLORIDE 10 MG: 5 TABLET ORAL at 12:37

## 2025-03-08 RX ADMIN — SODIUM CHLORIDE, PRESERVATIVE FREE 10 ML: 5 INJECTION INTRAVENOUS at 19:51

## 2025-03-08 RX ADMIN — OXYCODONE HYDROCHLORIDE 10 MG: 5 TABLET ORAL at 17:04

## 2025-03-08 RX ADMIN — Medication 5000 UNITS: at 12:37

## 2025-03-08 RX ADMIN — LABETALOL HYDROCHLORIDE 100 MG: 100 TABLET, FILM COATED ORAL at 14:25

## 2025-03-08 RX ADMIN — OXYCODONE HYDROCHLORIDE 10 MG: 5 TABLET ORAL at 02:33

## 2025-03-08 RX ADMIN — CLONIDINE HYDROCHLORIDE 0.3 MG: 0.2 TABLET ORAL at 14:25

## 2025-03-08 RX ADMIN — OXYCODONE HYDROCHLORIDE 10 MG: 5 TABLET ORAL at 21:27

## 2025-03-08 RX ADMIN — AMLODIPINE BESYLATE 5 MG: 5 TABLET ORAL at 12:37

## 2025-03-08 RX ADMIN — CLONIDINE HYDROCHLORIDE 0.3 MG: 0.2 TABLET ORAL at 19:50

## 2025-03-08 RX ADMIN — ISOSORBIDE MONONITRATE 120 MG: 60 TABLET, EXTENDED RELEASE ORAL at 19:51

## 2025-03-08 ASSESSMENT — PAIN - FUNCTIONAL ASSESSMENT: PAIN_FUNCTIONAL_ASSESSMENT: ACTIVITIES ARE NOT PREVENTED

## 2025-03-08 ASSESSMENT — PAIN DESCRIPTION - LOCATION
LOCATION: ARM

## 2025-03-08 ASSESSMENT — PAIN SCALES - GENERAL
PAINLEVEL_OUTOF10: 5
PAINLEVEL_OUTOF10: 6
PAINLEVEL_OUTOF10: 8
PAINLEVEL_OUTOF10: 3
PAINLEVEL_OUTOF10: 7

## 2025-03-08 ASSESSMENT — PAIN DESCRIPTION - ORIENTATION
ORIENTATION: LEFT

## 2025-03-08 ASSESSMENT — PAIN DESCRIPTION - DESCRIPTORS
DESCRIPTORS: BURNING;STABBING
DESCRIPTORS: ACHING
DESCRIPTORS: ACHING;SORE;DISCOMFORT

## 2025-03-08 NOTE — PROGRESS NOTES
[facility]   Nephrology progress note  3/8/2025, 11:37 AM      Pt Name:    Trav Jennings  MRN:     [id number]     YOB: 1965  Admit Date:    3/6/2025  2:31 AM    Chief Complaint: Nephrology following for ESRD.    Subjective:  Patient was seen and examined this morning  Doing ok.   starting dialysis at bedside    Objective:  24HR INTAKE/OUTPUT:    Intake/Output Summary (Last 24 hours) at 3/8/2025 1137  Last data filed at 3/8/2025 0750  Gross per 24 hour   Intake 845.16 ml   Output --   Net 845.16 ml         I/O last 3 completed shifts:  In: 360 [I.V.:10; Blood:350]  Out: 0   I/O this shift:  In: 485.2 [I.V.:485.2]  Out: -    Admission weight: 99.8 kg (220 lb 0.3 oz)  Wt Readings from Last 3 Encounters:   03/08/25 98 kg (216 lb 0.8 oz)   03/05/25 102.1 kg (225 lb)   01/27/25 100.8 kg (222 lb 3.2 oz)        Vitals :   Vitals:    03/08/25 0715 03/08/25 0730 03/08/25 0754 03/08/25 1129   BP: (!) 177/71 (!) 160/65     Pulse: 75 74 72    Resp: 23 14 14    Temp:  98 °F (36.7 °C)  98.4 °F (36.9 °C)   TempSrc:    Oral   SpO2: 97% 98% 98%    Weight:  98 kg (216 lb 0.8 oz)         Physical examination  General Appearance: alert and cooperative with exam, appears comfortable, no distress  Mouth/Throat: Oral mucosa moist  Neck: No JVD  Lungs:  no use of accessory muscles  Extremities: + LE edema    Medications:  Infusion:    niCARdipine 11.5 mg/hr (03/06/25 1208)    sodium chloride      nitroGLYCERIN      niCARdipine Stopped (03/06/25 1338)    norepinephrine       Meds:    epoetin qiana-epbx  6,000 Units SubCUTAneous Once per day on Monday Wednesday Friday    albuterol sulfate HFA  2 puff Inhalation BID RT    sodium chloride flush  5-40 mL IntraVENous 2 times per day    amiodarone  200 mg Oral Daily    amLODIPine  5 mg Oral Daily    [Held by provider] aspirin  81 mg Oral Daily    atorvastatin  40 mg Oral Daily    calcitRIOL  0.5 mcg Oral Once per day on Monday Wednesday Friday    [Held by  provider] cinacalcet  90 mg Oral Once per day on Monday Wednesday Friday    cloNIDine  0.3 mg Oral TID    hydrALAZINE  100 mg Oral q8h    isosorbide mononitrate  120 mg Oral BID    labetalol  100 mg Oral 3 times per day    multivitamin  1 tablet Oral Daily    sertraline  150 mg Oral Daily    thiamine  100 mg Oral Daily    Vitamin D  5,000 Units Oral Daily    warfarin placeholder: dosing by pharmacy   Oral RX Placeholder     Meds prn: sodium chloride flush, sodium chloride, labetalol, hydrALAZINE, nitroGLYCERIN, niCARdipine, norepinephrine, oxyCODONE **OR** oxyCODONE, ondansetron, albuterol sulfate HFA, hydrOXYzine pamoate, ondansetron, traZODone     Lab Data :  CBC:   Recent Labs     03/07/25  0530 03/07/25  1235 03/07/25  2307 03/08/25  0506   WBC 5.0 5.1  --  5.3   HGB 8.2* 7.5* 8.7* 8.7*   HCT 26.6* 24.6* 28.9* 27.7*   * 106*  --  86*     CMP:  Recent Labs     03/05/25  1906 03/06/25  0239 03/06/25  0645 03/07/25  0530 03/08/25  0506    141 142 138 138   K 3.9 5.0 6.2* 4.8 5.4*    100 104 102 102   CO2 26 25 26 25 25   BUN 54* 62* 64* 33* 48*   CREATININE 7.4* 8.4* 8.8* 6.4* 8.3*   GLUCOSE 125* 118* 117* 114* 101   CALCIUM 8.6 7.9* 7.2* 7.2* 7.4*   MG 2.4 2.4  --   --   --    PHOS 2.7  --   --  4.6* 5.5*     Hepatic:   Recent Labs     03/06/25  0239 03/06/25  0645   AST 18 17   ALT 14 9*   BILITOT 0.3 0.2*   ALKPHOS 105 87         Assessment and Plan:  ESRD on HD TTS  HD today- dialysis at bedside  Using 2K bath  Hyperkalemia, will correct with HD  Hypocalcemia: hold sensipar for now and monitor.   Massive AV Fistula hemorrhage s/p emergent ligation. Now has AVG which is working well  HTN  Anemia: on KENYETTA  Afib  thrombocytopenia      D/W patient     David Brown MD  [facility]    This report has been created using voice recognition software. It may contain minor errors which are inherent in voice recognition technology

## 2025-03-08 NOTE — PLAN OF CARE
Hospitalist Progress Note  Internal Medicine Resident      Patient: Trav Jennings 60 y.o. male      Unit/Bed: 3A-01/001-A    Admit Date: 3/6/2025      ASSESSMENT AND PLAN  Active Problems  Massive AV fistula hemorrhage: S/p emergent ligation 3/6/2025. POD 2. Has undergone dialysis x 2 with no difficulties.  Vascular surgery following.  Continue to hold aspirin per vascular's recommendation.  With slow resumption of warfarin.  Patient did have acute hemorrhage from prior left upper extremity fistula secondary to pseudoaneurysm rupture.  Continue PT/OT and ambulate patient.  Vascular surgery continues to follow, plan for dressing changes 3/9/2025  ESRD on HD: On a Tuesday, Thursday, Saturday schedule.  Nephrology was consulted, following.  Patient underwent dialysis 3/7 as well as repeat today, 3/8/2025.  He is tolerating dialysis with his new fistula  Nephrology following  Anemia: Chronic on KENYETTA.  Acute anemia in setting of recent hemorrhage.  S/p 2 units PPR C.  Initial hemoglobin on arrival 9.6 with repeat 7.5.  Patient is on Retacrit 6000 Monday, Wednesday, Friday.  Continue monitor hemoglobin, daily CBC.  Stable this time  Chronic Conditions (reviewed and stable unless otherwise stated)  Chronic hypoxic respiratory failure: Status post previous lung resection.  Baseline 3 L.  Hypercalcemia: Corrected baseline.  At this time calcium is low at 7.4.  Sensipar is being held per nephrology's recommendation  Hx thoracic aortic aneurysm/type B aortic dissection: S/p stent graft placement extending from proximal transverse aortic arch to distal thoracic aorta.  Paroxysmal atrial fibrillation: [id number] 4.  Rate control with metoprolol and rhythm control with amiodarone.  On warfarin, pharmacy following for dosing  Hyperlipidemia: Home medication, Lipitor 40 mg, daily  Alcohol use: Notes 20 cans/week.  On thiamine multivitamin.  We will add folic acid.  Continue to monitor for signs and symptoms of alcohol  withdrawal.  Asymptomatic this time  Hypertension: History of difficulty control blood pressure.  Home medications include amlodipine, clonidine, hydralazine, Imdur and labetalol.  BP was stable when evaluated in the room with systolic 130.  Cardene gtt. order in place from ICU    ===================================================================    Chief Complaint: AV fistula hemorrhage  Subjective (past 24 hours): Seen evaluated bedside, has been hemodynamically stable for transfer out of ICU.  Patient is transferred to stepdown unit.  Vascular surgery as well as nephrology continues to follow.  Patient underwent hemodialysis 3/8/2025, tolerated well.  Patient has some swelling of his left upper extremity and ice packs are applied with improvement of overall swelling.  He denies any fever, chills, shortness of breath, difficulty breathing, chest pain, Los pain nausea or vomiting.  No weakness or deficits of left upper extremity.    HPI / Hospital Course:  \"60-year-old with past medical history of diabetes, cocaine use, ESRD on HD, HTN, HLD, AAA who presented with chief complaint of bleeding from left upper extremity, brachiocephalic fistula.  Patient reports he was sitting in his recliner when suddenly started having massive blood loss from left radiocephalic fistula.  Reports he used his belt as a tourniquet.  Patient was taken for vascular surgery with Dr. Garcia. Had ligation of LUE brachiocephalic fistula, excision of aneurysmal portion of left upper extremity AV fistula.  Construction of a brachial to cephalic vein graft.  Patient was admitted to ICU for blood pressure control after surgery. \"  During hospital stay patient has undergone hemodialysis x 2, has tolerated well both on 3/7/25 as well as 3/8/2025.  Patient is feeling much better.    Medications:    Infusion Medications    niCARdipine 11.5 mg/hr (03/06/25 1208)    sodium chloride      nitroGLYCERIN      niCARdipine Stopped (03/06/25 1338)     arm increased swelling.  Dressings on incision sites.  Thrill in left upper extremity AV graft.  Warm left upper extremity.  Skin: Warm and dry. No rashes or lesions.  Neurologic:  No focal sensory/motor deficits in the upper or lower extremities. Cranial nerves:  grossly non-focal 2-12.     Psychiatric: Alert and oriented, normal insight and thought content.   Capillary Refill: Brisk,< 3 seconds.  Peripheral Pulses: +2 palpable, equal bilaterally.       Labs/Radiology: See chart or assessment above.     Electronically signed by Edu Page DO PGY-3 on 3/8/2025 at 1:53 PM  Case was discussed with Attending, Dr. Koby Dinh DO

## 2025-03-08 NOTE — PROGRESS NOTES
HEMODIALYSIS POST TREATMENT NOTE    Treatment time ordered: 4.0 hours    Actual treatment time: 4.0 hours    UltraFiltration Goal: 3.0 kgs  UltraFiltration Removed: 3.0 kgs      Pre Treatment weight: 98.0kgs  Post Treatment weight: 95.0kgs   Estimated Dry Weight: 95.3kgs     Access used:     Central Venous Catheter:          Tunneled or Non-tunneled: N/A           Site: N/A          Access Flow: N/A      Internal Access:       AV Fistula or AV Graft: AVG         Site: Left upper Arm       Access Flow: Good       Sign and symptoms of infection: No       If YES: N/A    Medications or blood products given: See MAR    Chronic outpatient schedule: TTS    Chronic outpatient unit: [facility]     Summary of response to treatment: Patient tolerated treatment good.  All patients blood returned.  3.0kgs of fluid removed without difficulty.  Needles pulled and sites clamped for 8 min per site.  Bruit and thrill are still present.    Explain if orders NOT met, was physician notified:N/A      ACES flowsheet faxed to patient unit/ placed in patient chart: Yes    Post assessment completed: Katerine Gonzalez RN    Report given to: Yue ICU RN      * Intra-treatment documented Safety Checks include the followin) Access and face visible at all times.     2) All connections and blood lines are secure with no kinks.     3) NVL alarm engaged.     4) Hemosafe device applied (if applicable).     5) No collapse of Arterial or Venous blood chambers.     6) All blood lines / pump segments in the air detectors.

## 2025-03-08 NOTE — FLOWSHEET NOTE
03/08/25 1200   Vital Signs   BP (!) 154/74   Temp 98.4 °F (36.9 °C)   Pulse 83   Respirations 16   SpO2 99 %   Weight - Scale 95 kg (209 lb 7 oz)   Weight Method Bed scale   Percent Weight Change 0   Post-Hemodialysis Assessment   Post-Treatment Procedures Blood returned;Access bleeding time < 10 minutes   Machine Disinfection Process Acid/Vinegar Clean;Heat Disinfect;Exterior Machine Disinfection   Rinseback Volume (ml) 400 ml   Blood Volume Processed (Liters) 90 L   Dialyzer Clearance Lightly streaked   Duration of Treatment (minutes) 240 minutes   Heparin Amount Administered During Treatment (mL) 0 mL   Hemodialysis Intake (ml) 400 ml   Hemodialysis Output (ml) 3400 ml   NET Removed (ml) 3000     4 hour treatment complete.  3 L net UF removed. Pt. tolerated well, Blood returned following treatment.  Pressure held to needle access sites for 10 minutes x 2.  Report provided to primary RN.  Paperwork printed and placed in bin to be scanned in to emr.

## 2025-03-08 NOTE — PLAN OF CARE
Problem: Chronic Conditions and Co-morbidities  Goal: Patient's chronic conditions and co-morbidity symptoms are monitored and maintained or improved  Outcome: Progressing     Problem: Discharge Planning  Goal: Discharge to home or other facility with appropriate resources  Outcome: Progressing     Problem: Skin/Tissue Integrity  Goal: Skin integrity remains intact  Description: 1.  Monitor for areas of redness and/or skin breakdown  2.  Assess vascular access sites hourly  3.  Every 4-6 hours minimum:  Change oxygen saturation probe site  4.  Every 4-6 hours:  If on nasal continuous positive airway pressure, respiratory therapy assess nares and determine need for appliance change or resting period  Outcome: Progressing     Problem: Safety - Adult  Goal: Free from fall injury  Outcome: Progressing     Problem: ABCDS Injury Assessment  Goal: Absence of physical injury  Outcome: Progressing     Problem: Pain  Goal: Verbalizes/displays adequate comfort level or baseline comfort level  Outcome: Progressing

## 2025-03-08 NOTE — PROGRESS NOTES
CRITICAL CARE CONSULT NOTE      Patient:  Trav Jennings    Unit/Bed:3A-01/001-A  YOB: 1965  MRN: [id number]   PCP: Radha Hou APRN - CNP  Date of Admission: 3/6/2025  Chief Complaint:-AV fistula hemorrhage    Assessment and Plan:    AV fistula hemorrhage status post emergent ligation: Patient presented with hemorrhage from left upper extremity fistula. With emergent ligation, construction of regular cephalic vein graft with Dr. Garcia 3/7/2025. New graft functioning, available for use immediately.   Hypotension, resolved: Likely secondary to blood loss.  Patient with history of uncontrolled hypertension, was on Cardene drip initially. Patient mildly hypotensive with blood pressure reading of 91/43, MAP of 59.  Cardene drip turned off. Transfused 2 Units PRBCs on 3/7/25. BP stable overnight. Levophed not started during ICU. Pt stable for transfer off ICU   ESRD on HD: Patient on Tuesday, Thursday, Saturday schedule.  Patient able to get dialysis 3/7/2025 after AV fistula ligation, reconstruction. Repeat dialysis done 3/8/25  Acute on chronic anemia: 2/2 AV fistula hemorrhage. Chronically due to end-stage renal disease.  Baseline hemoglobin of 10-11. Patient presented with hemoglobin of 9.7. Dropped to 7.5 in the afternoon. Patient transfused with 2 units of blood 3/7/25.  On Retacrit 6000 units every Monday, Wednesday, Friday. Hgb of 8.7 on 3/8/25  Paroxysmal atrial flutter: [id number] score of  4.  Patient on amiodarone 200 mg daily.  On warfarin.  Hyperkalemia, resolved: Presented with potassium level of 6.2.  Patient had hemodialysis 3/7/2025.  Repeat potassium level of 4.8.  History of uncontrolled hypertension: Patient has history of uncontrolled hypertension.  On amlodipine, clonidine, hydralazine, Imdur, labetalol at home.  Was placed on Cardene drip initially given hypertensive.  Cardene drip weaned off given patient hypotensive.  Will continue to monitor. BP stable overnight.  amiodarone  200 mg Oral Daily    amLODIPine  5 mg Oral Daily    [Held by provider] aspirin  81 mg Oral Daily    atorvastatin  40 mg Oral Daily    calcitRIOL  0.5 mcg Oral Once per day on Monday Wednesday Friday    [Held by provider] cinacalcet  90 mg Oral Once per day on Monday Wednesday Friday    cloNIDine  0.3 mg Oral TID    hydrALAZINE  100 mg Oral q8h    isosorbide mononitrate  120 mg Oral BID    labetalol  100 mg Oral 3 times per day    multivitamin  1 tablet Oral Daily    sertraline  150 mg Oral Daily    thiamine  100 mg Oral Daily    Vitamin D  5,000 Units Oral Daily    warfarin placeholder: dosing by pharmacy   Oral RX Placeholder       24HR INTAKE/OUTPUT:    Intake/Output Summary (Last 24 hours) at 3/8/2025 1202  Last data filed at 3/8/2025 0750  Gross per 24 hour   Intake 845.16 ml   Output --   Net 845.16 ml       Chest x-ray performed on: 3/7/25  IMPRESSION:  Mild pulmonary vascular congestion.  Small right pleural effusion.  Cardiomegaly.      DVT prophylaxis reviewed.  Coumadin is on hold    Electronically signed by   Clementine Thomas MD on 3/8/2025 at 11:59 AM

## 2025-03-08 NOTE — PROGRESS NOTES
03/07/25 0752    ondansetron (ZOFRAN) tablet 4 mg  4 mg Oral Q8H PRN Marco A Keys PA-C        sertraline (ZOLOFT) tablet 150 mg  150 mg Oral Daily aMrco A Keys PA-C   150 mg at 03/07/25 0751    thiamine tablet 100 mg  100 mg Oral Daily Marco A Keys PA-C   100 mg at 03/07/25 0752    traZODone (DESYREL) tablet 100 mg  100 mg Oral Nightly PRN Marco A Keys PA-C        Vitamin D (CHOLECALCIFEROL) tablet 5,000 Units  5,000 Units Oral Daily Marco A Keys PA-C   5,000 Units at 03/07/25 0752    warfarin placeholder: dosing by pharmacy   Oral RX Placeholder Marco A Keys PA-C         Facility-Administered Medications Ordered in Other Encounters   Medication Dose Route Frequency Provider Last Rate Last Admin    HYDROmorphone (DILAUDID) injection 1 mg  1 mg IntraVENous Once Yovany Perla MD        midazolam (VERSED) injection 1 mg  1 mg IntraVENous Once Yovany Perla MD         Allergies   Allergen Reactions    Humalog [Insulin Lispro] Other (See Comments)     Extreme sweating and intolerance. Patient absolutely refuses due to reaction.     Insulin Regular Human Hives    Lisinopril Swelling     Swelling in lips          [unfilled]      Patient Active Problem List   Diagnosis    FSGS (focal segmental glomerulosclerosis)    Diabetes mellitus type 2, controlled ([facility])    Monoclonal (M) protein disease, multiple 'M' protein    Depression    PTSD (post-traumatic stress disorder)    Secondary renal hyperparathyroidism    Cocaine use    Suicidal ideation    Substance induced mood disorder ([facility])    Hypertensive urgency    Renal artery stenosis ([facility]) with uncontrollable hypertension    Chronic alcoholism ([facility])    Severe cocaine use disorder ([facility])    Accelerated essential hypertension    Primary hypertension    LVH (left ventricular hypertrophy) due to hypertensive disease    Acute on chronic diastolic CHF (congestive heart failure) ([facility])    REZA (obstructive sleep apnea)    Headache, unspecified headache type    FH: HTN (hypertension)       03/08/2025 32.0 26.0 - 33.0 pg Final     MCHC   Date Value Ref Range Status   03/08/2025 31.4 (L) 32.2 - 35.5 gm/dl Final     RDW   Date Value Ref Range Status   04/03/2018 15.0 (H) 11.5 - 14.5 % Final     Platelets   Date Value Ref Range Status   03/08/2025 86 (L) 130 - 400 thou/mm3 Final     MPV   Date Value Ref Range Status   03/08/2025 10.1 9.4 - 12.4 fL Final     Comment:     Performed at [facility] [place] [address]        Magnesium   Date Value Ref Range Status   03/06/2025 2.4 1.6 - 2.6 mg/dL Final     Comment:     [place] [address]        Heart: RRR (not in Afib)  Lungs: crackles bases  Abd: soft, NT  MSK: left arm with dressings on incisions. Thrill in LUE AV graft. Graft is being accessed right now with needles in place. There is another pseudoaneurysm present that is thrombosed s/p bypass. Left hand without deficits. Radial signal at wrist.      A/P: 61 yo male with a bleeding left upper extremity brachiocephalic AV fistula due to a pseudoaneurysm s/p:    3/6/25 Jose  1. Ligation left upper extremity brachiocephalic fistula.  2.  Excision of aneurysmal portion of left upper extremity AV fistula.  3.  Construction of a brachial to cephalic vein graft using Flixene thick walled graft.          - OOB, ambulate, PT, OT.   - HTN control per nephrology. All care per ICU or hospitalist service.   - Hold aspirin due to low platelet count. I would resume warfarin slowly due to his recent bleeding.   - HD per nephrology.   - Disposition: Awaiting lower oxygen requirements, resolution of anemia. Mgmt per hospitalist/ICU services. We will follow along. Will perform dressing changes again tomorrow.     Total time spent managing and seeing the patient was 35 minutes

## 2025-03-08 NOTE — PROGRESS NOTES
Warfarin Pharmacy Consult Note    **Please contact pharmacist for discharge instructions**  Warfarin Indication: Atrial fibrillation/Atrial flutter  Target INR: 2.0-3.0  Dose prior to admission: 7.5mg daily    Recent Labs     03/07/25  0530 03/07/25  1235 03/07/25  2307 03/08/25  0506   HGB 8.2* 7.5* 8.7* 8.7*   * 106*  --  86*     Recent Labs     03/06/25  0239 03/07/25  0530 03/08/25  0506   INR 1.80* 1.29* 1.30*     Concurrent anticoagulants/antiplatelets: Aspirin (on hold)  Significant warfarin drug-drug interactions: Amiodarone, Sertraline     Date INR Warfarin Dose   3/6/25 1.8 7.5 mg   3/7/25   1.29 10 mg    3/8/25   1.30 7.5 mg                                        Monitoring:                   INR will be monitored daily.    Gissel GUEVARAPh., BCPS., 3/8/2025,8:20 AM

## 2025-03-09 VITALS
TEMPERATURE: 97.7 F | RESPIRATION RATE: 16 BRPM | WEIGHT: 210.03 LBS | SYSTOLIC BLOOD PRESSURE: 138 MMHG | HEART RATE: 72 BPM | OXYGEN SATURATION: 100 % | DIASTOLIC BLOOD PRESSURE: 59 MMHG | BODY MASS INDEX: 26.25 KG/M2

## 2025-03-09 LAB
ANION GAP SERPL CALC-SCNC: 10 MEQ/L (ref 8–16)
BUN SERPL-MCNC: 37 MG/DL (ref 8–23)
CA-I BLD ISE-SCNC: 1.09 MMOL/L (ref 1.12–1.32)
CALCIUM SERPL-MCNC: 7.8 MG/DL (ref 8.6–10)
CHLORIDE SERPL-SCNC: 99 MEQ/L (ref 98–111)
CO2 SERPL-SCNC: 31 MEQ/L (ref 22–29)
CREAT SERPL-MCNC: 6.3 MG/DL (ref 0.7–1.2)
DEPRECATED RDW RBC AUTO: 59 FL (ref 35–45)
ERYTHROCYTE [DISTWIDTH] IN BLOOD BY AUTOMATED COUNT: 15.8 % (ref 11.5–14.5)
GFR SERPL CREATININE-BSD FRML MDRD: 10 ML/MIN/1.73M2
GLUCOSE SERPL-MCNC: 108 MG/DL (ref 74–109)
HCT VFR BLD AUTO: 27.7 % (ref 42–52)
HGB BLD-MCNC: 8.6 GM/DL (ref 14–18)
INR PPP: 1.29 (ref 0.85–1.13)
MAGNESIUM SERPL-MCNC: 2.3 MG/DL (ref 1.6–2.6)
MCH RBC QN AUTO: 31.9 PG (ref 26–33)
MCHC RBC AUTO-ENTMCNC: 31 GM/DL (ref 32.2–35.5)
MCV RBC AUTO: 102.6 FL (ref 80–94)
PLATELET # BLD AUTO: 100 THOU/MM3 (ref 130–400)
PMV BLD AUTO: 10.5 FL (ref 9.4–12.4)
POTASSIUM SERPL-SCNC: 4.8 MEQ/L (ref 3.5–5.2)
RBC # BLD AUTO: 2.7 MILL/MM3 (ref 4.7–6.1)
SODIUM SERPL-SCNC: 140 MEQ/L (ref 135–145)
WBC # BLD AUTO: 5.1 THOU/MM3 (ref 4.8–10.8)

## 2025-03-09 PROCEDURE — 80048 BASIC METABOLIC PNL TOTAL CA: CPT

## 2025-03-09 PROCEDURE — 94669 MECHANICAL CHEST WALL OSCILL: CPT

## 2025-03-09 PROCEDURE — 6370000000 HC RX 637 (ALT 250 FOR IP)

## 2025-03-09 PROCEDURE — 97530 THERAPEUTIC ACTIVITIES: CPT

## 2025-03-09 PROCEDURE — 6370000000 HC RX 637 (ALT 250 FOR IP): Performed by: PHYSICIAN ASSISTANT

## 2025-03-09 PROCEDURE — 99232 SBSQ HOSP IP/OBS MODERATE 35: CPT | Performed by: INTERNAL MEDICINE

## 2025-03-09 PROCEDURE — 85027 COMPLETE CBC AUTOMATED: CPT

## 2025-03-09 PROCEDURE — 85610 PROTHROMBIN TIME: CPT

## 2025-03-09 PROCEDURE — 99232 SBSQ HOSP IP/OBS MODERATE 35: CPT | Performed by: SURGERY

## 2025-03-09 PROCEDURE — 6370000000 HC RX 637 (ALT 250 FOR IP): Performed by: PHARMACIST

## 2025-03-09 PROCEDURE — 83735 ASSAY OF MAGNESIUM: CPT

## 2025-03-09 PROCEDURE — 2700000000 HC OXYGEN THERAPY PER DAY

## 2025-03-09 PROCEDURE — 94640 AIRWAY INHALATION TREATMENT: CPT

## 2025-03-09 PROCEDURE — 94761 N-INVAS EAR/PLS OXIMETRY MLT: CPT

## 2025-03-09 PROCEDURE — 36415 COLL VENOUS BLD VENIPUNCTURE: CPT

## 2025-03-09 PROCEDURE — 97166 OT EVAL MOD COMPLEX 45 MIN: CPT

## 2025-03-09 PROCEDURE — 2500000003 HC RX 250 WO HCPCS: Performed by: PHYSICIAN ASSISTANT

## 2025-03-09 PROCEDURE — 82330 ASSAY OF CALCIUM: CPT

## 2025-03-09 PROCEDURE — 2060000000 HC ICU INTERMEDIATE R&B

## 2025-03-09 RX ADMIN — SERTRALINE 150 MG: 100 TABLET, FILM COATED ORAL at 08:20

## 2025-03-09 RX ADMIN — ALBUTEROL SULFATE 2 PUFF: 90 AEROSOL, METERED RESPIRATORY (INHALATION) at 08:50

## 2025-03-09 RX ADMIN — Medication 100 MG: at 08:20

## 2025-03-09 RX ADMIN — LABETALOL HYDROCHLORIDE 100 MG: 100 TABLET, FILM COATED ORAL at 05:47

## 2025-03-09 RX ADMIN — ATORVASTATIN CALCIUM 40 MG: 40 TABLET, FILM COATED ORAL at 20:07

## 2025-03-09 RX ADMIN — Medication 1 TABLET: at 08:20

## 2025-03-09 RX ADMIN — Medication 5000 UNITS: at 08:20

## 2025-03-09 RX ADMIN — HYDRALAZINE HYDROCHLORIDE 100 MG: 50 TABLET ORAL at 12:24

## 2025-03-09 RX ADMIN — ALBUTEROL SULFATE 2 PUFF: 90 AEROSOL, METERED RESPIRATORY (INHALATION) at 19:59

## 2025-03-09 RX ADMIN — SODIUM CHLORIDE, PRESERVATIVE FREE 10 ML: 5 INJECTION INTRAVENOUS at 20:08

## 2025-03-09 RX ADMIN — OXYCODONE HYDROCHLORIDE 10 MG: 5 TABLET ORAL at 08:19

## 2025-03-09 RX ADMIN — LABETALOL HYDROCHLORIDE 100 MG: 100 TABLET, FILM COATED ORAL at 15:31

## 2025-03-09 RX ADMIN — FOLIC ACID 1 MG: 1 TABLET ORAL at 08:20

## 2025-03-09 RX ADMIN — LABETALOL HYDROCHLORIDE 100 MG: 100 TABLET, FILM COATED ORAL at 23:00

## 2025-03-09 RX ADMIN — ISOSORBIDE MONONITRATE 120 MG: 60 TABLET, EXTENDED RELEASE ORAL at 20:07

## 2025-03-09 RX ADMIN — SODIUM CHLORIDE, PRESERVATIVE FREE 10 ML: 5 INJECTION INTRAVENOUS at 08:21

## 2025-03-09 RX ADMIN — AMLODIPINE BESYLATE 5 MG: 5 TABLET ORAL at 08:20

## 2025-03-09 RX ADMIN — CLONIDINE HYDROCHLORIDE 0.3 MG: 0.2 TABLET ORAL at 20:07

## 2025-03-09 RX ADMIN — AMIODARONE HYDROCHLORIDE 200 MG: 200 TABLET ORAL at 08:20

## 2025-03-09 RX ADMIN — WARFARIN SODIUM 12.5 MG: 10 TABLET ORAL at 18:17

## 2025-03-09 RX ADMIN — CLONIDINE HYDROCHLORIDE 0.3 MG: 0.2 TABLET ORAL at 08:20

## 2025-03-09 RX ADMIN — OXYCODONE HYDROCHLORIDE 10 MG: 5 TABLET ORAL at 20:09

## 2025-03-09 RX ADMIN — HYDRALAZINE HYDROCHLORIDE 100 MG: 50 TABLET ORAL at 20:07

## 2025-03-09 RX ADMIN — OXYCODONE HYDROCHLORIDE 10 MG: 5 TABLET ORAL at 15:31

## 2025-03-09 RX ADMIN — ISOSORBIDE MONONITRATE 120 MG: 60 TABLET, EXTENDED RELEASE ORAL at 08:20

## 2025-03-09 ASSESSMENT — PAIN DESCRIPTION - DESCRIPTORS
DESCRIPTORS: ACHING
DESCRIPTORS: ACHING

## 2025-03-09 ASSESSMENT — PAIN DESCRIPTION - LOCATION
LOCATION: ARM
LOCATION: ARM

## 2025-03-09 ASSESSMENT — PAIN - FUNCTIONAL ASSESSMENT
PAIN_FUNCTIONAL_ASSESSMENT: PREVENTS OR INTERFERES SOME ACTIVE ACTIVITIES AND ADLS
PAIN_FUNCTIONAL_ASSESSMENT: ACTIVITIES ARE NOT PREVENTED

## 2025-03-09 ASSESSMENT — PAIN DESCRIPTION - ORIENTATION
ORIENTATION: LEFT
ORIENTATION: LEFT

## 2025-03-09 ASSESSMENT — PAIN SCALES - WONG BAKER: WONGBAKER_NUMERICALRESPONSE: HURTS A LITTLE BIT

## 2025-03-09 ASSESSMENT — PAIN SCALES - GENERAL
PAINLEVEL_OUTOF10: 7
PAINLEVEL_OUTOF10: 7
PAINLEVEL_OUTOF10: 4

## 2025-03-09 NOTE — PLAN OF CARE
Problem: Chronic Conditions and Co-morbidities  Goal: Patient's chronic conditions and co-morbidity symptoms are monitored and maintained or improved  Outcome: Progressing  Flowsheets (Taken 3/9/2025 0455)  Care Plan - Patient's Chronic Conditions and Co-Morbidity Symptoms are Monitored and Maintained or Improved:   Monitor and assess patient's chronic conditions and comorbid symptoms for stability, deterioration, or improvement   Collaborate with multidisciplinary team to address chronic and comorbid conditions and prevent exacerbation or deterioration     Problem: Discharge Planning  Goal: Discharge to home or other facility with appropriate resources  Outcome: Progressing  Flowsheets (Taken 3/9/2025 0455)  Discharge to home or other facility with appropriate resources:   Identify barriers to discharge with patient and caregiver   Arrange for needed discharge resources and transportation as appropriate   Identify discharge learning needs (meds, wound care, etc)     Problem: Skin/Tissue Integrity  Goal: Skin integrity remains intact  Description: 1.  Monitor for areas of redness and/or skin breakdown  2.  Assess vascular access sites hourly  3.  Every 4-6 hours minimum:  Change oxygen saturation probe site  4.  Every 4-6 hours:  If on nasal continuous positive airway pressure, respiratory therapy assess nares and determine need for appliance change or resting period  Outcome: Progressing  Flowsheets (Taken 3/9/2025 0455)  Skin Integrity Remains Intact: Monitor for areas of redness and/or skin breakdown     Problem: Safety - Adult  Goal: Free from fall injury  Outcome: Progressing  Flowsheets (Taken 3/9/2025 0455)  Free From Fall Injury: Instruct family/caregiver on patient safety     Problem: ABCDS Injury Assessment  Goal: Absence of physical injury  Outcome: Progressing  Flowsheets (Taken 3/9/2025 0455)  Absence of Physical Injury: Implement safety measures based on patient assessment     Problem: Pain  Goal:

## 2025-03-09 NOTE — PROGRESS NOTES
[facility]  INPATIENT OCCUPATIONAL THERAPY  STRZ ICU STEPDOWN TELEMETRY 4K  EVALUATION      Discharge Recommendations: Home with assist PRN  Equipment Recommendations:          Time In: 09  Time Out: 1002  Timed Code Treatment Minutes: 27 Minutes  Minutes: 37          Date: 3/9/2025  Patient Name: Trav Jennings,   Gender: male      MRN: [id number]  : 1965  (60 y.o.)  Referring Practitioner: Marco A Keys PA-C  Diagnosis: Excessive bleeding  Additional Pertinent Hx: 60-year-old with past medical history of diabetes, cocaine use, ESRD on HD, HTN, HLD, AAA who presented with chief complaint of bleeding from left upper extremity, brachiocephalic fistula.  Patient reports he was sitting in his recliner when suddenly started having massive blood loss from left radiocephalic fistula.  Reports he used his belt as a tourniquet.  Patient was taken for vascular surgery with Dr. Garcia. Had ligation of LUE brachiocephalic fistula, excision of aneurysmal portion of left upper extremity AV fistula.  Construction of a brachial to cephalic vein graft.  Patient was admitted to ICU for blood pressure control after surgery.    Restrictions/Precautions:  Restrictions/Precautions: Fall Risk    Subjective  Chart Reviewed: Yes, Orders, Progress Notes, History and Physical, Imaging, Labs, Operative Notes  Patient assessed for rehabilitation services?: Yes    Subjective: Pt seated upright in bed upon arrival with wife present, agreeable to OT arrival. RN okayed therapy.    Pain: 7/10: LUE- pt had ice packs in place and UE elevated on pillows at end of session    Vitals:   Oxygen: Patient on 3L O2 via Nasal Canula with O2 sats at 98% upon arrival to room. Upon activity patient maintaining at >95%.  Heart Rate: 70's-80's    Social/Functional History:  Lives With: Spouse  Type of Home: Apartment  Home Layout: One level (Lives on 4th floor)  Home Access: Elevator  Home Equipment: Oxygen   Bathroom Shower/Tub: Tub/Shower

## 2025-03-09 NOTE — PLAN OF CARE
Problem: Respiratory - Adult  Goal: Clear lung sounds  3/9/2025 0853 by Jeffrey Chowdhury RCP  Outcome: Progressing  3/8/2025 2023 by Darlyn Sweet RCP  Outcome: Progressing

## 2025-03-09 NOTE — PROGRESS NOTES
Warfarin Pharmacy Consult Note    **Please contact pharmacist for discharge instructions**  Warfarin Indication: Atrial fibrillation/Atrial flutter  Target INR: 2.0-3.0  Dose prior to admission: 7.5 mg daily    Recent Labs     03/07/25  1235 03/07/25  2307 03/08/25  0506 03/09/25  0417   HGB 7.5* 8.7* 8.7* 8.6*   *  --  86* 100*     Recent Labs     03/07/25  0530 03/08/25  0506 03/09/25  0417   INR 1.29* 1.30* 1.29*     Concurrent anticoagulants/antiplatelets: Aspirin (on hold)  Significant warfarin drug-drug interactions: Amiodarone, Sertraline     Date INR Warfarin Dose   3/6/25 1.8 7.5 mg   3/7/25   1.29 10 mg    3/8/25   1.30  not given    3/9/25  1.29 12.5 mg                                Monitoring:                   INR will be monitored daily.    Gissel YEE.Ph., BCPS., 3/9/2025,10:01 AM

## 2025-03-09 NOTE — PROGRESS NOTES
[facility]   Nephrology progress note  3/9/2025, 1:03 PM      Pt Name:    Trav Jennings  MRN:     [id number]     YOB: 1965  Admit Date:    3/6/2025  2:31 AM    Chief Complaint: Nephrology following for ESRD.    Subjective:  Patient was seen and examined this morning  Doing ok.       Objective:  24HR INTAKE/OUTPUT:    Intake/Output Summary (Last 24 hours) at 3/9/2025 1303  Last data filed at 3/9/2025 0502  Gross per 24 hour   Intake 100 ml   Output --   Net 100 ml         I/O last 3 completed shifts:  In: 1395.2 [P.O.:100; I.V.:495.2]  Out: 6800   No intake/output data recorded.   Admission weight: 99.8 kg (220 lb 0.3 oz)  Wt Readings from Last 3 Encounters:   03/09/25 95.3 kg (210 lb 0.5 oz)   03/05/25 102.1 kg (225 lb)   01/27/25 100.8 kg (222 lb 3.2 oz)        Vitals :   Vitals:    03/09/25 0813 03/09/25 0849 03/09/25 0852 03/09/25 1130   BP: 127/65   (!) 122/58   Pulse: 66   69   Resp: 18 18  16   Temp: 97.9 °F (36.6 °C)   97.8 °F (36.6 °C)   TempSrc: Oral   Oral   SpO2:   98% 95%   Weight:           Physical examination  General Appearance: alert and cooperative with exam, appears comfortable, no distress  Mouth/Throat: Oral mucosa moist  Neck: No JVD  Lungs:  no use of accessory muscles  Extremities: + LE edema    Medications:  Infusion:    sodium chloride      nitroGLYCERIN      niCARdipine Stopped (03/06/25 1338)     Meds:    warfarin  12.5 mg Oral Once    folic acid  1 mg Oral Daily    epoetin qiana-epbx  6,000 Units SubCUTAneous Once per day on Monday Wednesday Friday    albuterol sulfate HFA  2 puff Inhalation BID RT    sodium chloride flush  5-40 mL IntraVENous 2 times per day    amiodarone  200 mg Oral Daily    amLODIPine  5 mg Oral Daily    [Held by provider] aspirin  81 mg Oral Daily    atorvastatin  40 mg Oral Daily    calcitRIOL  0.5 mcg Oral Once per day on Monday Wednesday Friday    [Held by provider] cinacalcet  90 mg Oral Once per day on Monday Wednesday Friday     cloNIDine  0.3 mg Oral TID    hydrALAZINE  100 mg Oral q8h    isosorbide mononitrate  120 mg Oral BID    labetalol  100 mg Oral 3 times per day    multivitamin  1 tablet Oral Daily    sertraline  150 mg Oral Daily    thiamine  100 mg Oral Daily    Vitamin D  5,000 Units Oral Daily    warfarin placeholder: dosing by pharmacy   Oral RX Placeholder     Meds prn: sodium chloride flush, sodium chloride, labetalol, hydrALAZINE, nitroGLYCERIN, niCARdipine, oxyCODONE **OR** oxyCODONE, ondansetron, albuterol sulfate HFA, hydrOXYzine pamoate, ondansetron, traZODone     Lab Data :  CBC:   Recent Labs     03/07/25  1235 03/07/25  2307 03/08/25  0506 03/09/25  0417   WBC 5.1  --  5.3 5.1   HGB 7.5* 8.7* 8.7* 8.6*   HCT 24.6* 28.9* 27.7* 27.7*   *  --  86* 100*     CMP:  Recent Labs     03/07/25  0530 03/08/25  0506 03/09/25  0417    138 140   K 4.8 5.4* 4.8    102 99   CO2 25 25 31*   BUN 33* 48* 37*   CREATININE 6.4* 8.3* 6.3*   GLUCOSE 114* 101 108   CALCIUM 7.2* 7.4* 7.8*   MG  --   --  2.3   PHOS 4.6* 5.5*  --      Hepatic:   No results for input(s): \"LABALBU\", \"AST\", \"ALT\", \"BILITOT\", \"ALKPHOS\" in the last 72 hours.    Invalid input(s): \"ALB\"        Assessment and Plan:  ESRD on HD TTS  Re-evaluate in am  No need for HD today  Hyperkalemia: resolved  Hypocalcemia: hold sensipar for now and monitor.   Massive AV Fistula hemorrhage s/p emergent ligation. Now has AVG which is working well  HTN  Anemia: on KENYETTA  Afib  thrombocytopenia      D/W patient     David Brown MD  [facility]    This report has been created using voice recognition software. It may contain minor errors which are inherent in voice recognition technology

## 2025-03-09 NOTE — PROGRESS NOTES
Hospitalist Progress Note  Internal Medicine Resident      Patient: Trav Jennings 60 y.o. male      Unit/Bed: -08/008-A    Admit Date: 3/6/2025      ASSESSMENT AND PLAN  Active Problems  AV fistula hemorrhage: s/p emergent ligation 3/6/2025. Underwent dialysis x 2 with no difficulties.  Vascular surgery following.  Continue to hold aspirin due to low platelets.   Continue PT/OT.   Continue warfarin with pharmacy dose, not require bridging.  Vascular surgery following, recommended regulation of oxygen requirement and anemia.   Dressing changed on 3/9/2025.   ESRD on HD: Follows up with Dr. Gabo MORFIN At home on Tuesday Thursday Saturday schedule.  Nephrology following.  Underwent dialysis on 3/7, 3/8, patient tolerated the dialysis session well.  Possible plan for dialysis on 3/10/2025, based on morning labs.   Acute on chronic macrocytic anemia secondary to AV fistula hemorrhage and ESRD: Hemoglobin 8.6, 9.7 on admission.  Baseline 10.5-11.  Platelets 100, INR 1.29.  Iron studies ordered.  On Retacrit 6000 units sq Monday/Wednesday/Friday?  Hypocalcemia secondary to Sensipar: Calcium 1.09, holding Sensipar.  Recheck iCal in a.m. continue calcitriol 0.5 mcg/ on Monday Wednesday Friday.   Resolved Problems  --  Chronic Conditions (reviewed and stable unless otherwise stated)  Chronic hypoxic respiratory failure, secondary to lung resection, REZA.  Baseline at 3 L with sleep or as needed.  Maintain SpO2 >90%.  Sleep study (4/6/2016) noted CPAP requirement at 15 cm H2O.  Patient reports only using 3 to 4 L as needed oxygen via nasal cannula at night.   History of thoracic aortic aneurysm/type B: Status post stent graft placement extending from proximal transverse aortic arch to distal thoracic aorta.  Paroxysmal atrial fibrillation: [id number] score 4.  Continue amiodarone 200 Mg/daily, continue pharmacy dose warfarin, INR goal 2.0-3.0.   Hyperlipidemia: Continue home dose Lipitor 40 mg/daily.   Alcohol use: Noted  with Dr. Schmitt.  Had ligation of left upper extremity brachiocephalic fistula, excision of aneurysmal portion of the left upper extremity AV fistula.  Construction of a brachial to cephalic vein graft.  Patient was admitted to ICU for blood pressure control after surgery.  During hospital stay patient has undergone hemodialysis x 2, tolerated it well.     Medications:    Infusion Medications    sodium chloride      nitroGLYCERIN      niCARdipine Stopped (03/06/25 9257)    Scheduled Medications    warfarin  12.5 mg Oral Once    folic acid  1 mg Oral Daily    epoetin qiana-epbx  6,000 Units SubCUTAneous Once per day on Monday Wednesday Friday    albuterol sulfate HFA  2 puff Inhalation BID RT    sodium chloride flush  5-40 mL IntraVENous 2 times per day    amiodarone  200 mg Oral Daily    amLODIPine  5 mg Oral Daily    [Held by provider] aspirin  81 mg Oral Daily    atorvastatin  40 mg Oral Daily    calcitRIOL  0.5 mcg Oral Once per day on Monday Wednesday Friday    [Held by provider] cinacalcet  90 mg Oral Once per day on Monday Wednesday Friday    cloNIDine  0.3 mg Oral TID    hydrALAZINE  100 mg Oral q8h    isosorbide mononitrate  120 mg Oral BID    labetalol  100 mg Oral 3 times per day    multivitamin  1 tablet Oral Daily    sertraline  150 mg Oral Daily    thiamine  100 mg Oral Daily    Vitamin D  5,000 Units Oral Daily    warfarin placeholder: dosing by pharmacy   Oral RX Placeholder    PRN Meds: sodium chloride flush, sodium chloride, labetalol, hydrALAZINE, nitroGLYCERIN, niCARdipine, oxyCODONE **OR** oxyCODONE, ondansetron, albuterol sulfate HFA, hydrOXYzine pamoate, ondansetron, traZODone    Exam:  /60   Pulse 80   Temp 98.1 °F (36.7 °C) (Oral)   Resp 18   Wt 95.3 kg (210 lb 0.5 oz)   SpO2 90%   BMI 26.25 kg/m²   General: Acutely ill-appearing male.   Eyes:  PERRL. Conjunctivae/corneas clear.  HENT: Head normal appearing. Nares normal. Oral mucosa moist.  Hearing intact.   Neck: Supple, with

## 2025-03-09 NOTE — RT PROTOCOL NOTE
at home then do not decrease Frequency below that used at home.    0-3 - enter or revise RT bronchodilator order(s) to equivalent RT Bronchodilator order with Frequency of every 4 hours PRN for wheezing or increased work of breathing using Per Protocol order mode.        4-6 - enter or revise RT Bronchodilator order(s) to two equivalent RT bronchodilator orders with one order with BID Frequency and one order with Frequency of every 4 hours PRN wheezing or increased work of breathing using Per Protocol order mode.        7-10 - enter or revise RT Bronchodilator order(s) to two equivalent RT bronchodilator orders with one order with TID Frequency and one order with Frequency of every 4 hours PRN wheezing or increased work of breathing using Per Protocol order mode.       11-13 - enter or revise RT Bronchodilator order(s) to one equivalent RT bronchodilator order with QID Frequency and an Albuterol order with Frequency of every 4 hours PRN wheezing or increased work of breathing using Per Protocol order mode.      Greater than 13 - enter or revise RT Bronchodilator order(s) to one equivalent RT bronchodilator order with every 4 hours Frequency and an Albuterol order with Frequency of every 2 hours PRN wheezing or increased work of breathing using Per Protocol order mode.     RT to enter RT Home Evaluation for COPD & MDI Assessment order using Per Protocol order mode.    Electronically signed by Darlyn Sweet RCP on 3/8/2025 at 8:22 PM

## 2025-03-09 NOTE — RT PROTOCOL NOTE
RT Inhaler-Nebulizer Bronchodilator Protocol Note    There is a bronchodilator order in the chart from a provider indicating to follow the RT Bronchodilator Protocol and there is an “Initiate RT Inhaler-Nebulizer Bronchodilator Protocol” order as well (see protocol at bottom of note).    CXR Findings:  No results found.    The findings from the last RT Protocol Assessment were as follows:   History Pulmonary Disease: Smoker 15 pack years or more  Respiratory Pattern: Regular pattern and RR 12-20 bpm  Breath Sounds: Slightly diminished and/or crackles  Cough: Strong, spontaneous, non-productive  Indication for Bronchodilator Therapy: Decreased or absent breath sounds  Bronchodilator Assessment Score: 3    Aerosolized bronchodilator medication orders have been revised according to the RT Inhaler-Nebulizer Bronchodilator Protocol below.    Respiratory Therapist to perform RT Therapy Protocol Assessment initially then follow the protocol.  Repeat RT Therapy Protocol Assessment PRN for score 0-3 or on second treatment, BID, and PRN for scores above 3.    No Indications - adjust the frequency to every 6 hours PRN wheezing or bronchospasm, if no treatments needed after 48 hours then discontinue using Per Protocol order mode.     If indication present, adjust the RT bronchodilator orders based on the Bronchodilator Assessment Score as indicated below.  Use Inhaler orders unless patient has one or more of the following: on home nebulizer, not able to hold breath for 10 seconds, is not alert and oriented, cannot activate and use MDI correctly, or respiratory rate 25 breaths per minute or more, then use the equivalent nebulizer order(s) with same Frequency and PRN reasons based on the score.  If a patient is on this medication at home then do not decrease Frequency below that used at home.    0-3 - enter or revise RT bronchodilator order(s) to equivalent RT Bronchodilator order with Frequency of every 4 hours PRN for wheezing  or increased work of breathing using Per Protocol order mode.        4-6 - enter or revise RT Bronchodilator order(s) to two equivalent RT bronchodilator orders with one order with BID Frequency and one order with Frequency of every 4 hours PRN wheezing or increased work of breathing using Per Protocol order mode.        7-10 - enter or revise RT Bronchodilator order(s) to two equivalent RT bronchodilator orders with one order with TID Frequency and one order with Frequency of every 4 hours PRN wheezing or increased work of breathing using Per Protocol order mode.       11-13 - enter or revise RT Bronchodilator order(s) to one equivalent RT bronchodilator order with QID Frequency and an Albuterol order with Frequency of every 4 hours PRN wheezing or increased work of breathing using Per Protocol order mode.      Greater than 13 - enter or revise RT Bronchodilator order(s) to one equivalent RT bronchodilator order with every 4 hours Frequency and an Albuterol order with Frequency of every 2 hours PRN wheezing or increased work of breathing using Per Protocol order mode.     RT to enter RT Home Evaluation for COPD & MDI Assessment order using Per Protocol order mode.    Electronically signed by Jeffrey Chowdhury RCP on 3/9/2025 at 8:54 AM

## 2025-03-09 NOTE — PROGRESS NOTES
S: No overnight events. Pt now on 4K. Still on 3 L NC. Plt ct 100k now. Hb 8.6, stable. He underwent dialysis yesterday.      O:   Vitals:    03/09/25 0813 03/09/25 0849 03/09/25 0852 03/09/25 1130   BP: 127/65   (!) 122/58   Pulse: 66   69   Resp: 18 18  16   Temp: 97.9 °F (36.6 °C)   97.8 °F (36.6 °C)   TempSrc: Oral   Oral   SpO2:   98% 95%   Weight:            Current Facility-Administered Medications   Medication Dose Route Frequency Provider Last Rate Last Admin    warfarin (COUMADIN) tablet 12.5 mg  12.5 mg Oral Once Gissel Pineda, [facility]        folic acid (FOLVITE) tablet 1 mg  1 mg Oral Daily Edu Page,    1 mg at 03/09/25 0820    epoetin qiana-epbx (RETACRIT) 6,000 Units combo injection  6,000 Units SubCUTAneous Once per day on Monday Wednesday Friday Hermila Clayton DO   6,000 Units at 03/07/25 1841    albuterol sulfate HFA (PROVENTIL;VENTOLIN;PROAIR) 108 (90 Base) MCG/ACT inhaler 2 puff  2 puff Inhalation BID RT Juan Spring DO   2 puff at 03/09/25 0850    sodium chloride flush 0.9 % injection 5-40 mL  5-40 mL IntraVENous 2 times per day Symone Gómez PA   10 mL at 03/09/25 0821    sodium chloride flush 0.9 % injection 5-40 mL  5-40 mL IntraVENous PRN Symone Gómez PA        0.9 % sodium chloride infusion   IntraVENous PRN Symone Gómez PA        labetalol (NORMODYNE;TRANDATE) injection 10 mg  10 mg IntraVENous Q2H PRN Symone Gómez PA        hydrALAZINE (APRESOLINE) injection 10 mg  10 mg IntraVENous Q1H PRN Symone Gómez PA        nitroGLYCERIN 200 mcg/mL in dextrose 5%  5-200 mcg/min IntraVENous Continuous PRN Symone Gómez PA        niCARdipine (CARDENE) 25 mg in sodium chloride 0.9 % 250 mL infusion ([id number])  3-15 mg/hr IntraVENous Continuous PRN Symone Gómez PA   Stopped at 03/06/25 1338    oxyCODONE (ROXICODONE) immediate release tablet 5 mg  5 mg Oral Q4H PRN Symone Gómez PA        Or    oxyCODONE (ROXICODONE)  400 thou/mm3 Final     MPV   Date Value Ref Range Status   03/09/2025 10.5 9.4 - 12.4 fL Final     Comment:     Performed at [facility] [address] [place] [address]        Magnesium   Date Value Ref Range Status   03/09/2025 2.3 1.6 - 2.6 mg/dL Final     Comment:     Performed at [facility] [address] [address]        Heart: RRR (not in Afib)  Lungs: crackles bases  Abd: soft, NT  MSK: left arm dressings removed. All inc c/d/I without drainage or hematoma. I feel a thrill in the LUE AV graft. Dressings replaced.   Left hand without deficits. Radial signal at wrist.      A/P: 61 yo male with a bleeding left upper extremity brachiocephalic AV fistula due to a pseudoaneurysm s/p:    3/6/25 Jose  1. Ligation left upper extremity brachiocephalic fistula.  2.  Excision of aneurysmal portion of left upper extremity AV fistula.  3.  Construction of a brachial to cephalic vein graft using Flixene thick walled graft.          - OOB, ambulate, PT, OT.   - HTN control per nephrology. All care per hospitalist service.   - Hold aspirin due to low platelet count. OK to resume warfarin for his Afib.   - HD per nephrology. He is TuThSat HD at home.   - Disposition: Awaiting lower oxygen requirements, resolution of anemia. Mgmt per hospitalist service. We will follow along. Will perform dressing changes again tomorrow. OK to use AV graft for dialysis.     Total time spent managing and seeing the patient was 35 minutes

## 2025-03-10 ENCOUNTER — APPOINTMENT (OUTPATIENT)
Dept: INTERVENTIONAL RADIOLOGY/VASCULAR | Age: 60
DRG: 252 | End: 2025-03-10
Payer: MEDICARE

## 2025-03-10 VITALS
TEMPERATURE: 98.5 F | SYSTOLIC BLOOD PRESSURE: 174 MMHG | OXYGEN SATURATION: 100 % | HEIGHT: 75 IN | BODY MASS INDEX: 27.98 KG/M2 | WEIGHT: 225 LBS | RESPIRATION RATE: 16 BRPM | DIASTOLIC BLOOD PRESSURE: 82 MMHG | HEART RATE: 68 BPM

## 2025-03-10 LAB
ANION GAP SERPL CALC-SCNC: 13 MEQ/L (ref 8–16)
BUN SERPL-MCNC: 56 MG/DL (ref 8–23)
CALCIUM SERPL-MCNC: 8 MG/DL (ref 8.6–10)
CHLORIDE SERPL-SCNC: 98 MEQ/L (ref 98–111)
CO2 SERPL-SCNC: 27 MEQ/L (ref 22–29)
CREAT SERPL-MCNC: 8.4 MG/DL (ref 0.7–1.2)
DEPRECATED RDW RBC AUTO: 58.8 FL (ref 35–45)
ERYTHROCYTE [DISTWIDTH] IN BLOOD BY AUTOMATED COUNT: 15.1 % (ref 11.5–14.5)
GFR SERPL CREATININE-BSD FRML MDRD: 7 ML/MIN/1.73M2
GLUCOSE SERPL-MCNC: 88 MG/DL (ref 74–109)
HCT VFR BLD AUTO: 30.2 % (ref 42–52)
HGB BLD-MCNC: 9.1 GM/DL (ref 14–18)
INR PPP: 1.16 (ref 0.85–1.13)
MAGNESIUM SERPL-MCNC: 2.6 MG/DL (ref 1.6–2.6)
MCH RBC QN AUTO: 31.7 PG (ref 26–33)
MCHC RBC AUTO-ENTMCNC: 30.1 GM/DL (ref 32.2–35.5)
MCV RBC AUTO: 105.2 FL (ref 80–94)
PLATELET # BLD AUTO: 111 THOU/MM3 (ref 130–400)
PMV BLD AUTO: 10.4 FL (ref 9.4–12.4)
POTASSIUM SERPL-SCNC: 5.3 MEQ/L (ref 3.5–5.2)
RBC # BLD AUTO: 2.87 MILL/MM3 (ref 4.7–6.1)
SODIUM SERPL-SCNC: 138 MEQ/L (ref 135–145)
WBC # BLD AUTO: 5 THOU/MM3 (ref 4.8–10.8)

## 2025-03-10 PROCEDURE — 99232 SBSQ HOSP IP/OBS MODERATE 35: CPT | Performed by: INTERNAL MEDICINE

## 2025-03-10 PROCEDURE — 2500000003 HC RX 250 WO HCPCS: Performed by: PHYSICIAN ASSISTANT

## 2025-03-10 PROCEDURE — 6370000000 HC RX 637 (ALT 250 FOR IP): Performed by: INTERNAL MEDICINE

## 2025-03-10 PROCEDURE — 93931 UPPER EXTREMITY STUDY: CPT

## 2025-03-10 PROCEDURE — 80048 BASIC METABOLIC PNL TOTAL CA: CPT

## 2025-03-10 PROCEDURE — 36415 COLL VENOUS BLD VENIPUNCTURE: CPT

## 2025-03-10 PROCEDURE — 6370000000 HC RX 637 (ALT 250 FOR IP)

## 2025-03-10 PROCEDURE — 85610 PROTHROMBIN TIME: CPT

## 2025-03-10 PROCEDURE — 83735 ASSAY OF MAGNESIUM: CPT

## 2025-03-10 PROCEDURE — 6370000000 HC RX 637 (ALT 250 FOR IP): Performed by: PHARMACIST

## 2025-03-10 PROCEDURE — 6370000000 HC RX 637 (ALT 250 FOR IP): Performed by: PHYSICIAN ASSISTANT

## 2025-03-10 PROCEDURE — 2060000000 HC ICU INTERMEDIATE R&B

## 2025-03-10 PROCEDURE — APPSS30 APP SPLIT SHARED TIME 16-30 MINUTES: Performed by: PHYSICIAN ASSISTANT

## 2025-03-10 PROCEDURE — 94761 N-INVAS EAR/PLS OXIMETRY MLT: CPT

## 2025-03-10 PROCEDURE — 85027 COMPLETE CBC AUTOMATED: CPT

## 2025-03-10 PROCEDURE — 94640 AIRWAY INHALATION TREATMENT: CPT

## 2025-03-10 RX ORDER — WARFARIN SODIUM 10 MG/1
10 TABLET ORAL ONCE
Status: COMPLETED | OUTPATIENT
Start: 2025-03-10 | End: 2025-03-10

## 2025-03-10 RX ORDER — SENNOSIDES A AND B 8.6 MG/1
1 TABLET, FILM COATED ORAL NIGHTLY
Status: DISCONTINUED | OUTPATIENT
Start: 2025-03-10 | End: 2025-03-11 | Stop reason: HOSPADM

## 2025-03-10 RX ORDER — POLYETHYLENE GLYCOL 3350 17 G/17G
17 POWDER, FOR SOLUTION ORAL DAILY
Status: DISCONTINUED | OUTPATIENT
Start: 2025-03-10 | End: 2025-03-11 | Stop reason: HOSPADM

## 2025-03-10 RX ADMIN — LABETALOL HYDROCHLORIDE 100 MG: 100 TABLET, FILM COATED ORAL at 13:15

## 2025-03-10 RX ADMIN — CLONIDINE HYDROCHLORIDE 0.3 MG: 0.2 TABLET ORAL at 13:15

## 2025-03-10 RX ADMIN — ISOSORBIDE MONONITRATE 120 MG: 60 TABLET, EXTENDED RELEASE ORAL at 08:10

## 2025-03-10 RX ADMIN — SERTRALINE 150 MG: 100 TABLET, FILM COATED ORAL at 08:10

## 2025-03-10 RX ADMIN — Medication 5000 UNITS: at 08:10

## 2025-03-10 RX ADMIN — LABETALOL HYDROCHLORIDE 100 MG: 100 TABLET, FILM COATED ORAL at 05:39

## 2025-03-10 RX ADMIN — POLYETHYLENE GLYCOL 3350 17 G: 17 POWDER, FOR SOLUTION ORAL at 08:10

## 2025-03-10 RX ADMIN — ISOSORBIDE MONONITRATE 120 MG: 60 TABLET, EXTENDED RELEASE ORAL at 20:21

## 2025-03-10 RX ADMIN — ATORVASTATIN CALCIUM 40 MG: 40 TABLET, FILM COATED ORAL at 20:20

## 2025-03-10 RX ADMIN — ALBUTEROL SULFATE 2 PUFF: 90 AEROSOL, METERED RESPIRATORY (INHALATION) at 10:30

## 2025-03-10 RX ADMIN — SENNOSIDES 8.6 MG: 8.6 TABLET, FILM COATED ORAL at 20:20

## 2025-03-10 RX ADMIN — ALBUTEROL SULFATE 2 PUFF: 90 AEROSOL, METERED RESPIRATORY (INHALATION) at 20:36

## 2025-03-10 RX ADMIN — LABETALOL HYDROCHLORIDE 100 MG: 100 TABLET, FILM COATED ORAL at 22:10

## 2025-03-10 RX ADMIN — CLONIDINE HYDROCHLORIDE 0.3 MG: 0.2 TABLET ORAL at 22:09

## 2025-03-10 RX ADMIN — HYDRALAZINE HYDROCHLORIDE 100 MG: 50 TABLET ORAL at 20:21

## 2025-03-10 RX ADMIN — SODIUM CHLORIDE, PRESERVATIVE FREE 10 ML: 5 INJECTION INTRAVENOUS at 20:21

## 2025-03-10 RX ADMIN — CALCITRIOL CAPSULES 0.25 MCG 0.5 MCG: 0.25 CAPSULE ORAL at 08:10

## 2025-03-10 RX ADMIN — OXYCODONE HYDROCHLORIDE 10 MG: 5 TABLET ORAL at 20:20

## 2025-03-10 RX ADMIN — FOLIC ACID 1 MG: 1 TABLET ORAL at 08:10

## 2025-03-10 RX ADMIN — WARFARIN SODIUM 10 MG: 10 TABLET ORAL at 16:35

## 2025-03-10 RX ADMIN — Medication 100 MG: at 08:10

## 2025-03-10 RX ADMIN — SODIUM CHLORIDE, PRESERVATIVE FREE 10 ML: 5 INJECTION INTRAVENOUS at 08:10

## 2025-03-10 RX ADMIN — ALBUTEROL SULFATE 2 PUFF: 90 AEROSOL, METERED RESPIRATORY (INHALATION) at 21:38

## 2025-03-10 RX ADMIN — OXYCODONE HYDROCHLORIDE 10 MG: 5 TABLET ORAL at 08:10

## 2025-03-10 RX ADMIN — AMLODIPINE BESYLATE 5 MG: 5 TABLET ORAL at 08:10

## 2025-03-10 RX ADMIN — Medication 1 TABLET: at 08:10

## 2025-03-10 RX ADMIN — AMIODARONE HYDROCHLORIDE 200 MG: 200 TABLET ORAL at 08:10

## 2025-03-10 RX ADMIN — OXYCODONE HYDROCHLORIDE 5 MG: 5 TABLET ORAL at 13:15

## 2025-03-10 RX ADMIN — HYDRALAZINE HYDROCHLORIDE 100 MG: 50 TABLET ORAL at 03:24

## 2025-03-10 RX ADMIN — SODIUM ZIRCONIUM CYCLOSILICATE 10 G: 10 POWDER, FOR SUSPENSION ORAL at 11:05

## 2025-03-10 RX ADMIN — CLONIDINE HYDROCHLORIDE 0.3 MG: 0.2 TABLET ORAL at 08:10

## 2025-03-10 ASSESSMENT — PAIN DESCRIPTION - ORIENTATION
ORIENTATION: LEFT

## 2025-03-10 ASSESSMENT — PAIN DESCRIPTION - LOCATION
LOCATION: ARM

## 2025-03-10 ASSESSMENT — PAIN SCALES - GENERAL
PAINLEVEL_OUTOF10: 3
PAINLEVEL_OUTOF10: 5
PAINLEVEL_OUTOF10: 8
PAINLEVEL_OUTOF10: 4
PAINLEVEL_OUTOF10: 5
PAINLEVEL_OUTOF10: 4
PAINLEVEL_OUTOF10: 4
PAINLEVEL_OUTOF10: 7

## 2025-03-10 ASSESSMENT — PAIN DESCRIPTION - FREQUENCY
FREQUENCY: CONTINUOUS

## 2025-03-10 ASSESSMENT — PAIN DESCRIPTION - ONSET
ONSET: ON-GOING

## 2025-03-10 ASSESSMENT — PAIN - FUNCTIONAL ASSESSMENT
PAIN_FUNCTIONAL_ASSESSMENT: PREVENTS OR INTERFERES SOME ACTIVE ACTIVITIES AND ADLS
PAIN_FUNCTIONAL_ASSESSMENT: ACTIVITIES ARE NOT PREVENTED
PAIN_FUNCTIONAL_ASSESSMENT: PREVENTS OR INTERFERES SOME ACTIVE ACTIVITIES AND ADLS

## 2025-03-10 ASSESSMENT — PAIN DESCRIPTION - PAIN TYPE
TYPE: SURGICAL PAIN

## 2025-03-10 ASSESSMENT — PAIN DESCRIPTION - DESCRIPTORS
DESCRIPTORS: ACHING;DISCOMFORT
DESCRIPTORS: ACHING

## 2025-03-10 NOTE — PLAN OF CARE
Problem: Chronic Conditions and Co-morbidities  Goal: Patient's chronic conditions and co-morbidity symptoms are monitored and maintained or improved  Outcome: Progressing  Flowsheets (Taken 3/10/2025 0253)  Care Plan - Patient's Chronic Conditions and Co-Morbidity Symptoms are Monitored and Maintained or Improved:   Monitor and assess patient's chronic conditions and comorbid symptoms for stability, deterioration, or improvement   Collaborate with multidisciplinary team to address chronic and comorbid conditions and prevent exacerbation or deterioration     Problem: Discharge Planning  Goal: Discharge to home or other facility with appropriate resources  Outcome: Progressing  Flowsheets (Taken 3/10/2025 0253)  Discharge to home or other facility with appropriate resources:   Identify barriers to discharge with patient and caregiver   Arrange for needed discharge resources and transportation as appropriate   Identify discharge learning needs (meds, wound care, etc)     Problem: Skin/Tissue Integrity  Goal: Skin integrity remains intact  Description: 1.  Monitor for areas of redness and/or skin breakdown  2.  Assess vascular access sites hourly  3.  Every 4-6 hours minimum:  Change oxygen saturation probe site  4.  Every 4-6 hours:  If on nasal continuous positive airway pressure, respiratory therapy assess nares and determine need for appliance change or resting period  Outcome: Progressing  Flowsheets (Taken 3/10/2025 0253)  Skin Integrity Remains Intact: Monitor for areas of redness and/or skin breakdown     Problem: Safety - Adult  Goal: Free from fall injury  Outcome: Progressing  Flowsheets (Taken 3/10/2025 0253)  Free From Fall Injury: Instruct family/caregiver on patient safety     Problem: ABCDS Injury Assessment  Goal: Absence of physical injury  Outcome: Progressing  Flowsheets (Taken 3/10/2025 0253)  Absence of Physical Injury: Implement safety measures based on patient assessment     Problem:  Pain  Goal: Verbalizes/displays adequate comfort level or baseline comfort level  Outcome: Progressing  Flowsheets (Taken 3/10/2025 0253)  Verbalizes/displays adequate comfort level or baseline comfort level:   Encourage patient to monitor pain and request assistance   Assess pain using appropriate pain scale   Administer analgesics based on type and severity of pain and evaluate response   Implement non-pharmacological measures as appropriate and evaluate response

## 2025-03-10 NOTE — PLAN OF CARE
Problem: Chronic Conditions and Co-morbidities  Goal: Patient's chronic conditions and co-morbidity symptoms are monitored and maintained or improved  3/10/2025 1039 by Farzaneh King, RN  Outcome: Progressing  Flowsheets (Taken 3/10/2025 0800)  Care Plan - Patient's Chronic Conditions and Co-Morbidity Symptoms are Monitored and Maintained or Improved:   Monitor and assess patient's chronic conditions and comorbid symptoms for stability, deterioration, or improvement   Collaborate with multidisciplinary team to address chronic and comorbid conditions and prevent exacerbation or deterioration   Update acute care plan with appropriate goals if chronic or comorbid symptoms are exacerbated and prevent overall improvement and discharge     Problem: Discharge Planning  Goal: Discharge to home or other facility with appropriate resources  3/10/2025 1039 by Farzaneh King, RN  Outcome: Progressing  Flowsheets (Taken 3/10/2025 0800)  Discharge to home or other facility with appropriate resources:   Identify barriers to discharge with patient and caregiver   Arrange for needed discharge resources and transportation as appropriate   Identify discharge learning needs (meds, wound care, etc)     Problem: Skin/Tissue Integrity  Goal: Skin integrity remains intact  Description: 1.  Monitor for areas of redness and/or skin breakdown  2.  Assess vascular access sites hourly  3.  Every 4-6 hours minimum:  Change oxygen saturation probe site  4.  Every 4-6 hours:  If on nasal continuous positive airway pressure, respiratory therapy assess nares and determine need for appliance change or resting period  3/10/2025 1039 by Farzaneh King, RN  Outcome: Progressing  Flowsheets (Taken 3/10/2025 0800)  Skin Integrity Remains Intact:   Monitor for areas of redness and/or skin breakdown   Assess vascular access sites hourly   Every 4-6 hours minimum: Change oxygen saturation probe site   Every 4-6 hours: If on nasal

## 2025-03-10 NOTE — PROGRESS NOTES
Spiritual Health History and Assessment/Progress Note  [facility]    Initial Encounter,  ,  ,      Name: Trav Jennings MRN: [id number]    Age: 60 y.o.     Sex: male   Language: English   Confucianist: Faith   Excessive bleeding     Date: 3/10/2025            Total Time Calculated: 30 min              Spiritual Assessment began in [facility] ICU STEPDOWN TELEMETRY 4K        Referral/Consult From: Rounding   Encounter Overview/Reason: Initial Encounter  Service Provided For: Patient and family together    Elizabeth, Belief, Meaning:   Patient identifies as spiritual, is connected with a elizabeth tradition or spiritual practice, and has beliefs or practices that help with coping during difficult times  Family/Friends identify as spiritual, are connected with a elizabeth tradition or spiritual practice, and have beliefs or practices that help with coping during difficult times      Importance and Influence:  Patient has spiritual/personal beliefs that influence decisions regarding their health  Family/Friends have spiritual/personal beliefs that influence decisions regarding the patient's health    Community:  Patient is connected with a spiritual community  Family/Friends are connected with a spiritual community:    Assessment and Plan of Care:   PT is a 60 yr old  man, pt was sitting upright in bed, pt spouse was sitting to back left of pt in chair, pt was open to  visit. Through active listening pt shared \"he had a rupture of port line for dialysis, and that he was thankful by the kvng of God that he didn't bleed out.\" Pt shared he had been taking \"dialysis for the past 11 years and had never experienced anything like that before.\" Pt shared he reads his Bible everyday, has a elizabeth community. Pt requested prayer for \"the world knowing Michi and continued healing.  prayed for the pt per his religous wishes.   Patient Interventions include: Provided sacramental/Buddhist  ritual  Family/Friends Interventions include: Provided sacramental/Tenriism ritual    Patient Plan of Care: Spiritual Care available upon further referral  Family/Friends Plan of Care: Spiritual Care available upon further referral    Electronically signed by William Parker  Intern on 3/10/2025 at 4:57 PM

## 2025-03-10 NOTE — PLAN OF CARE
Problem: Respiratory - Adult  Goal: Clear lung sounds  3/9/2025 2002 by Naomi Clifton RCP  Outcome: Progressing   Patient mutually agreed on goals.

## 2025-03-10 NOTE — ED PROVIDER NOTES
[facility]  EMERGENCY DEPARTMENT ENCOUNTER   PHYSICIAN ATTESTATION    Pt Name: Trav Jennings  MRN: [id number]  Birthdate 1965  Date of evaluation: 9/12/20      I personally saw and examined the patient. I have reviewed and agree with the Resident findings, including all diagnostic interpretations and treatment plans as written. I was present for the key portion of any procedures performed and the inclusive time noted in any critical care statement.       History:     I had seen this patient earlier in the evening for other reasons.  The case had been checked out to me by the previous physician with a chest pain rule out.  We are waiting for the second troponin to come back and that came back negative.  The plan was to discharge him home.  He still had the fistula accessed at the time and that was discontinued by nursing staff.      He has now returned a few hours later after having a pop in the fistula and apparently massive bleeding that was shooting out under pressure.  Required a lot of tourniquet pressure to get the bleeding to stop on the part of EMS.  We cannot remove the tourniquet without very heavy bleeding.    Vascular surgery was consulted immediately for operative management.  The patient went emergently to the operating room.    There was no loss of consciousness, no further chest pain/dyspnea.    See resident note for further details in this patient's care.    CRITICAL CARE: There was a high probability of clinically significant/life threatening deterioration in this patient's condition which required my urgent intervention.  Total critical care time was 30 minutes.  This excludes any time for separately reportable procedures.           Diagnosis: Hemorrhage from AV dialysis fistula  Plan: Patient went to the OR           DO Maritza Adams Lawrence, DO  03/09/25 0208

## 2025-03-10 NOTE — PROGRESS NOTES
Hospitalist Progress Note  Internal Medicine Resident      Patient: Trav Jennings 60 y.o. male      Unit/Bed: [facility]08/008-A    Admit Date: 3/6/2025      ASSESSMENT AND PLAN  Active Problems  AV fistula hemorrhage: S/p ligation of left upper extremity brachiocephalic fistula with excision of aneurysm portion of left upper extremity AV fistula and construction of brachial to cephalic vein graft on 3/6.  Vascular following: Vascular duplex left arm arterial to confirm graft patency ordered for 3/10.  Continue current treatment.  ESRD on HD (TThS):Follows with nephrology, Dr. Ayon outpatient.   Nephrology following: 3/9-no need for HD today will reevaluate in a.m.  Acute on chronic microcytic anemia secondary to AV fistula hemorrhaging and ESRD: Baseline hemoglobin 10.5-11.  Hemoglobin on arrival 9.7.  Monitor CBC  Continue Retacrit 6000 units MWF  Hypocalcemia secondary to Sensipar: Currently holding Sensipar.  Continue calcitriol 0.5 mcg MWF.  Expect improvement following dialysis.  Hyperkalemia: 3/10 potassium 5.3.  Expect improvement following dialysis.  Monitor BMP.  Constipation: Patient reported on 3/10 that he has not had a bowel movement since 3/5.  Start GlycoLax and senna scheduled for bowel regimen.    Resolved Problems      Chronic Conditions (reviewed and stable unless otherwise stated)  Chronic hypoxic respiratory failure secondary to lung resection, and REZA: Patient uses 3 L supplemental oxygen with sleep at baseline.  Sleep study/6/2016 noted CPAP requirement at 15 cm H2O.  Chronic diastolic HFpEF: TTE 12/6/2024 showed EF of 60-65% with moderate increased wall thickness of left ventricle.  Abnormal diastolic function noted.  Mild MR. No home GDMT.  Continue Imdur 120 mg twice daily.  History of thoracic aortic aneurysm type B: S/p stent graft placement extending from proximal transverse aortic arch to distal thoracic aorta.  Noted.  Chronic thrombocytopenia secondary to ESRD: Baseline platelets  Hearing intact.   Neck: Supple, with full range of motion. Trachea midline.  No gross JVD appreciated.  Respiratory:  Normal effort. Clear to auscultation, without rales or wheezes or rhonchi.  Cardiovascular: Normal rate, regular rhythm with normal S1/S2 with systolic murmurs.    No lower extremity edema.   Abdomen: Soft, non-tender, non-distended with normal bowel sounds.  Musculoskeletal: No joint swelling or tenderness. Normal tone. No abnormal movements.   Skin: Warm and dry.  Left upper extremity surgical surgical sites clean.  Neurologic:  No focal sensory/motor deficits in the upper or lower extremities. Cranial nerves:  grossly non-focal 2-12.     Psychiatric: Alert and oriented, normal insight and thought content.   Capillary Refill: Brisk,< 3 seconds.  Peripheral Pulses: +2 palpable, equal bilaterally.       Labs/Radiology: See chart or assessment above.     Electronically signed by Velasquez Mehta MD on 3/10/2025 at 8:24 AM  Case was discussed with Attending, Dr. Chen.     This report has been created using voice recognition software. It may contain minor errors which are inherent in voice recognition technology

## 2025-03-10 NOTE — PROGRESS NOTES
Warfarin Pharmacy Consult Note    **Please contact pharmacist for discharge instructions**  Warfarin Indication: Atrial fibrillation/Atrial flutter  Target INR: 2.0-3.0  Dose prior to admission: 7.5 mg daily    Recent Labs     03/08/25  0506 03/09/25  0417 03/10/25  0546   HGB 8.7* 8.6* 9.1*   PLT 86* 100* 111*     Recent Labs     03/08/25  0506 03/09/25  0417 03/10/25  0546   INR 1.30* 1.29* 1.16*     Concurrent anticoagulants/antiplatelets: Aspirin (on hold)  Significant warfarin drug-drug interactions: Amiodarone, Sertraline     Date INR Warfarin Dose   3/6/25 1.8 7.5 mg   3/7/25   1.29 10 mg    3/8/25   1.30  not given    3/9/25  1.29 12.5 mg     3/10/25 1.16   10 mg                      Monitoring:                   INR will be monitored daily.    Teresa Albright, PharmD, BCPS   3/10/2025  1:36 PM

## 2025-03-10 NOTE — PROGRESS NOTES
Acute respiratory failure with hypoxia (HCC)    ESRD (end stage renal disease) on dialysis (HCC)    Noncompliance of patient with dietary regimen    Pleural pain    Post-thoracotomy pain syndrome    Chronic pain syndrome    Acute on chronic respiratory failure with hypoxia (HCC)    Abdominal pain    Nausea    Submandibular swelling    Abdominal pain, epigastric    Shortness of breath    Acute hyperkalemia    Dyspnea, unspecified    Atrial fibrillation with RVR ([facility])    Encounter for cardioversion procedure    Severe hypertension    Hyperkalemia    Atrial flutter ([facility])    Encounter for therapeutic drug monitoring    Anticoagulated on Coumadin    Mechanical complication of arteriovenous fistula surgically created    End stage renal disease ([facility])    Excessive bleeding    S/P arteriovenous (AV) fistula repair     Plan:   Vascular duplex left arm arterial to confirm graft patency this morning.  Continue current therapy    The plan of care was discussed in detail with Dr. Rao.    Edu Keys PA-C

## 2025-03-10 NOTE — PROGRESS NOTES
S: No overnight events. Pt now on 4K. Still on 3 L NC during night. Plt ct 111k now. Hb 9.1, stable. Possible dialysis today.      O:   Vitals:    03/10/25 0315 03/10/25 0536 03/10/25 0537 03/10/25 0800   BP: 134/74  (!) 161/79 (!) 144/75   Pulse: 65  66 67   Resp: 17   18   Temp: 97.5 °F (36.4 °C)   97.5 °F (36.4 °C)   TempSrc: Oral   Oral   SpO2: 100%   98%   Weight:  113.3 kg (249 lb 12.5 oz)          Current Facility-Administered Medications   Medication Dose Route Frequency Provider Last Rate Last Admin    senna (SENOKOT) tablet 8.6 mg  1 tablet Oral Nightly Velasquez Mehta MD        polyethylene glycol (GLYCOLAX) packet 17 g  17 g Oral Daily Velasquez Mehat MD   17 g at 03/10/25 0810    folic acid (FOLVITE) tablet 1 mg  1 mg Oral Daily Edu Page DO   1 mg at 03/10/25 0810    epoetin qiana-epbx (RETACRIT) 6,000 Units combo injection  6,000 Units SubCUTAneous Once per day on Monday Wednesday Friday Hermila Clayton DO   6,000 Units at 03/07/25 1841    albuterol sulfate HFA (PROVENTIL;VENTOLIN;PROAIR) 108 (90 Base) MCG/ACT inhaler 2 puff  2 puff Inhalation BID RT Juan Spring DO   2 puff at 03/09/25 1959    sodium chloride flush 0.9 % injection 5-40 mL  5-40 mL IntraVENous 2 times per day Symone Gómez PA   10 mL at 03/10/25 0810    sodium chloride flush 0.9 % injection 5-40 mL  5-40 mL IntraVENous PRN Symone Gómez PA        0.9 % sodium chloride infusion   IntraVENous PRN Symone Gómez PA        labetalol (NORMODYNE;TRANDATE) injection 10 mg  10 mg IntraVENous Q2H PRN Symone Gómez PA        hydrALAZINE (APRESOLINE) injection 10 mg  10 mg IntraVENous Q1H PRN Sakhvala, David P, DO        oxyCODONE (ROXICODONE) immediate release tablet 5 mg  5 mg Oral Q4H PRN Symone Gómez PA        Or    oxyCODONE (ROXICODONE) immediate release tablet 10 mg  10 mg Oral Q4H PRN Symone Gómez PA   10 mg at 03/10/25 0810    ondansetron (ZOFRAN) injection 4 mg  4  Partial Lung Resection performed by Ralph Becerra MD at [facility] OR    VASCULAR SURGERY Left 07/08/2016    AV Fistula    VASCULAR SURGERY Right 1990    Surgery on Achilles tendon         Social History     Socioeconomic History    Marital status:      Spouse name: Andrea    Number of children: 2    Years of education: 12    Highest education level: Not on file   Occupational History     Employer: UNEMPLOYED   Tobacco Use    Smoking status: Every Day     Current packs/day: 0.25     Average packs/day: 0.3 packs/day for 39.4 years (9.9 ttl pk-yrs)     Types: Cigarettes     Start date: 10/11/1985    Smokeless tobacco: Never    Tobacco comments:     occasional   Vaping Use    Vaping status: Never Used   Substance and Sexual Activity    Alcohol use: Yes     Alcohol/week: 20.0 standard drinks of alcohol     Types: 20 Cans of beer per week    Drug use: Yes     Types: Cocaine     Comment: was clean for about a month and relapsed 12/3 about 0100    Sexual activity: Yes     Partners: Female   Other Topics Concern    Not on file   Social History Narrative    Not on file     Social Drivers of Health     Financial Resource Strain: Not on file   Food Insecurity: Food Insecurity Present (3/6/2025)    Hunger Vital Sign     Worried About Running Out of Food in the Last Year: Sometimes true     Ran Out of Food in the Last Year: Sometimes true   Transportation Needs: No Transportation Needs (3/6/2025)    PRAPARE - Transportation     Lack of Transportation (Medical): No     Lack of Transportation (Non-Medical): No   Recent Concern: Transportation Needs - Unmet Transportation Needs (12/23/2024)    PRAPARE - Transportation     Lack of Transportation (Medical): Yes     Lack of Transportation (Non-Medical): Yes   Physical Activity: Not on file   Stress: Not on file   Social Connections: Not on file   Intimate Partner Violence: Not on file   Housing Stability: High Risk (3/6/2025)    Housing Stability Vital Sign     Unable to Pay for

## 2025-03-10 NOTE — PROGRESS NOTES
Agree with Juancarlos SURESH's note. Faint signal in AV graft. Will check duplex. Left arm incisions otherwise c/d/I, no erythema or drainage. All mgmt per primary team.

## 2025-03-10 NOTE — PROGRESS NOTES
[facility]   Nephrology progress note  3/10/2025, 9:57 AM      Pt Name:    Trav Jennings  MRN:     [id number]     YOB: 1965  Admit Date:    3/6/2025  2:31 AM    Chief Complaint: Nephrology following for ESRD.    Subjective:  Patient was seen and examined this morning  Doing ok. On nasal canula 98%.         Objective:  24HR INTAKE/OUTPUT:    Intake/Output Summary (Last 24 hours) at 3/10/2025 0957  Last data filed at 3/10/2025 0412  Gross per 24 hour   Intake 350 ml   Output 0 ml   Net 350 ml         I/O last 3 completed shifts:  In: 450 [P.O.:450]  Out: 0   No intake/output data recorded.   Admission weight: 99.8 kg (220 lb 0.3 oz)  Wt Readings from Last 3 Encounters:   03/10/25 113.3 kg (249 lb 12.5 oz)   03/05/25 102.1 kg (225 lb)   01/27/25 100.8 kg (222 lb 3.2 oz)        Vitals :   Vitals:    03/10/25 0536 03/10/25 0537 03/10/25 0800 03/10/25 0840   BP:  (!) 161/79 (!) 144/75    Pulse:  66 67    Resp:   18 16   Temp:   97.5 °F (36.4 °C)    TempSrc:   Oral    SpO2:   98%    Weight: 113.3 kg (249 lb 12.5 oz)          Physical examination  General Appearance: alert and cooperative with exam, appears comfortable, no distress  Mouth/Throat: Oral mucosa moist  Neck: No JVD  Lungs:  clear  Heart: S1, S2  Extremities: some edema noted    Medications:  Infusion:    sodium chloride       Meds:    senna  1 tablet Oral Nightly    polyethylene glycol  17 g Oral Daily    sodium zirconium cyclosilicate  10 g Oral Once    folic acid  1 mg Oral Daily    epoetin qiana-epbx  6,000 Units SubCUTAneous Once per day on Monday Wednesday Friday    albuterol sulfate HFA  2 puff Inhalation BID RT    sodium chloride flush  5-40 mL IntraVENous 2 times per day    amiodarone  200 mg Oral Daily    amLODIPine  5 mg Oral Daily    [Held by provider] aspirin  81 mg Oral Daily    atorvastatin  40 mg Oral Daily    calcitRIOL  0.5 mcg Oral Once per day on Monday Wednesday Friday    [Held by provider] cinacalcet  90 mg

## 2025-03-11 LAB
ANION GAP SERPL CALC-SCNC: 15 MEQ/L (ref 8–16)
BUN SERPL-MCNC: 76 MG/DL (ref 8–23)
CALCIUM SERPL-MCNC: 7.8 MG/DL (ref 8.6–10)
CHLORIDE SERPL-SCNC: 96 MEQ/L (ref 98–111)
CO2 SERPL-SCNC: 25 MEQ/L (ref 22–29)
CREAT SERPL-MCNC: 10.4 MG/DL (ref 0.7–1.2)
DEPRECATED RDW RBC AUTO: 57.1 FL (ref 35–45)
ERYTHROCYTE [DISTWIDTH] IN BLOOD BY AUTOMATED COUNT: 15.1 % (ref 11.5–14.5)
GFR SERPL CREATININE-BSD FRML MDRD: 5 ML/MIN/1.73M2
GLUCOSE SERPL-MCNC: 95 MG/DL (ref 74–109)
HCT VFR BLD AUTO: 27.2 % (ref 42–52)
HGB BLD-MCNC: 8.3 GM/DL (ref 14–18)
INR PPP: 1.35 (ref 0.85–1.13)
MAGNESIUM SERPL-MCNC: 2.5 MG/DL (ref 1.6–2.6)
MCH RBC QN AUTO: 31.9 PG (ref 26–33)
MCHC RBC AUTO-ENTMCNC: 30.5 GM/DL (ref 32.2–35.5)
MCV RBC AUTO: 104.6 FL (ref 80–94)
PLATELET # BLD AUTO: 110 THOU/MM3 (ref 130–400)
PMV BLD AUTO: 9.9 FL (ref 9.4–12.4)
POTASSIUM SERPL-SCNC: 5.8 MEQ/L (ref 3.5–5.2)
RBC # BLD AUTO: 2.6 MILL/MM3 (ref 4.7–6.1)
SODIUM SERPL-SCNC: 136 MEQ/L (ref 135–145)
WBC # BLD AUTO: 5.2 THOU/MM3 (ref 4.8–10.8)

## 2025-03-11 PROCEDURE — 6370000000 HC RX 637 (ALT 250 FOR IP): Performed by: PHYSICIAN ASSISTANT

## 2025-03-11 PROCEDURE — 99232 SBSQ HOSP IP/OBS MODERATE 35: CPT | Performed by: INTERNAL MEDICINE

## 2025-03-11 PROCEDURE — 85610 PROTHROMBIN TIME: CPT

## 2025-03-11 PROCEDURE — 6370000000 HC RX 637 (ALT 250 FOR IP)

## 2025-03-11 PROCEDURE — 85027 COMPLETE CBC AUTOMATED: CPT

## 2025-03-11 PROCEDURE — 2500000003 HC RX 250 WO HCPCS: Performed by: PHYSICIAN ASSISTANT

## 2025-03-11 PROCEDURE — APPSS30 APP SPLIT SHARED TIME 16-30 MINUTES: Performed by: PHYSICIAN ASSISTANT

## 2025-03-11 PROCEDURE — 83735 ASSAY OF MAGNESIUM: CPT

## 2025-03-11 PROCEDURE — 90935 HEMODIALYSIS ONE EVALUATION: CPT

## 2025-03-11 PROCEDURE — 80048 BASIC METABOLIC PNL TOTAL CA: CPT

## 2025-03-11 PROCEDURE — 36415 COLL VENOUS BLD VENIPUNCTURE: CPT

## 2025-03-11 RX ORDER — CLONIDINE HYDROCHLORIDE 0.3 MG/1
0.3 TABLET ORAL 3 TIMES DAILY
Qty: 270 TABLET | Refills: 0 | Status: SHIPPED | OUTPATIENT
Start: 2025-03-11

## 2025-03-11 RX ORDER — LABETALOL 100 MG/1
100 TABLET, FILM COATED ORAL EVERY 8 HOURS SCHEDULED
Qty: 270 TABLET | Refills: 0 | Status: SHIPPED | OUTPATIENT
Start: 2025-03-11

## 2025-03-11 RX ORDER — HYDRALAZINE HYDROCHLORIDE 100 MG/1
100 TABLET, FILM COATED ORAL EVERY 8 HOURS
Qty: 270 TABLET | Refills: 0 | Status: SHIPPED | OUTPATIENT
Start: 2025-03-11 | End: 2025-06-09

## 2025-03-11 RX ORDER — ISOSORBIDE MONONITRATE 120 MG/1
120 TABLET, EXTENDED RELEASE ORAL 2 TIMES DAILY
Qty: 180 TABLET | Refills: 0 | Status: SHIPPED | OUTPATIENT
Start: 2025-03-11 | End: 2025-06-09

## 2025-03-11 RX ORDER — OXYCODONE HYDROCHLORIDE 5 MG/1
5 TABLET ORAL EVERY 6 HOURS PRN
Qty: 12 TABLET | Refills: 0 | Status: SHIPPED | OUTPATIENT
Start: 2025-03-11 | End: 2025-03-14

## 2025-03-11 RX ORDER — AMLODIPINE BESYLATE 5 MG/1
5 TABLET ORAL DAILY
Qty: 90 TABLET | Refills: 0 | Status: SHIPPED | OUTPATIENT
Start: 2025-03-11

## 2025-03-11 RX ORDER — FOLIC ACID 1 MG/1
1 TABLET ORAL DAILY
Qty: 30 TABLET | Refills: 3 | Status: SHIPPED | OUTPATIENT
Start: 2025-03-12

## 2025-03-11 RX ADMIN — AMLODIPINE BESYLATE 5 MG: 5 TABLET ORAL at 12:46

## 2025-03-11 RX ADMIN — FOLIC ACID 1 MG: 1 TABLET ORAL at 12:47

## 2025-03-11 RX ADMIN — Medication 1 TABLET: at 12:47

## 2025-03-11 RX ADMIN — Medication 5000 UNITS: at 12:48

## 2025-03-11 RX ADMIN — HYDRALAZINE HYDROCHLORIDE 100 MG: 50 TABLET ORAL at 12:47

## 2025-03-11 RX ADMIN — SODIUM CHLORIDE, PRESERVATIVE FREE 10 ML: 5 INJECTION INTRAVENOUS at 12:55

## 2025-03-11 RX ADMIN — OXYCODONE HYDROCHLORIDE 10 MG: 5 TABLET ORAL at 12:48

## 2025-03-11 RX ADMIN — CLONIDINE HYDROCHLORIDE 0.3 MG: 0.2 TABLET ORAL at 12:58

## 2025-03-11 RX ADMIN — LABETALOL HYDROCHLORIDE 100 MG: 100 TABLET, FILM COATED ORAL at 12:58

## 2025-03-11 RX ADMIN — POLYETHYLENE GLYCOL 3350 17 G: 17 POWDER, FOR SOLUTION ORAL at 12:46

## 2025-03-11 RX ADMIN — OXYCODONE HYDROCHLORIDE 10 MG: 5 TABLET ORAL at 05:13

## 2025-03-11 RX ADMIN — AMIODARONE HYDROCHLORIDE 200 MG: 200 TABLET ORAL at 12:46

## 2025-03-11 RX ADMIN — Medication 100 MG: at 12:47

## 2025-03-11 RX ADMIN — SERTRALINE 150 MG: 100 TABLET, FILM COATED ORAL at 12:47

## 2025-03-11 RX ADMIN — HYDRALAZINE HYDROCHLORIDE 100 MG: 50 TABLET ORAL at 04:45

## 2025-03-11 RX ADMIN — ISOSORBIDE MONONITRATE 120 MG: 60 TABLET, EXTENDED RELEASE ORAL at 12:47

## 2025-03-11 RX ADMIN — OXYCODONE HYDROCHLORIDE 10 MG: 5 TABLET ORAL at 00:54

## 2025-03-11 ASSESSMENT — PAIN SCALES - GENERAL
PAINLEVEL_OUTOF10: 7
PAINLEVEL_OUTOF10: 5
PAINLEVEL_OUTOF10: 3
PAINLEVEL_OUTOF10: 7
PAINLEVEL_OUTOF10: 7

## 2025-03-11 ASSESSMENT — PAIN DESCRIPTION - ORIENTATION
ORIENTATION: LEFT
ORIENTATION: LEFT;UPPER
ORIENTATION: LEFT;UPPER

## 2025-03-11 ASSESSMENT — PAIN DESCRIPTION - PAIN TYPE
TYPE: SURGICAL PAIN
TYPE: SURGICAL PAIN

## 2025-03-11 ASSESSMENT — PAIN DESCRIPTION - DESCRIPTORS
DESCRIPTORS: ACHING;SORE
DESCRIPTORS: ACHING
DESCRIPTORS: ACHING;DISCOMFORT

## 2025-03-11 ASSESSMENT — PAIN DESCRIPTION - ONSET
ONSET: ON-GOING
ONSET: ON-GOING

## 2025-03-11 ASSESSMENT — PAIN DESCRIPTION - FREQUENCY
FREQUENCY: CONTINUOUS
FREQUENCY: CONTINUOUS

## 2025-03-11 ASSESSMENT — PAIN DESCRIPTION - LOCATION
LOCATION: ARM

## 2025-03-11 NOTE — PROGRESS NOTES
CT/CV Surgery Progress Note    3/11/2025 9:13 AM  Surgeon:  Dr. Garcia    Procedure: POD #5  1.  Ligation left upper extremity brachiocephalic fistula.  2.  Excision of aneurysmal portion of left upper extremity AV fistula.  3.  Construction of a brachial to cephalic vein graft using Flixene thick walled graft.    Subjective:  Mr. Jennings is resting comfortably in bed, alert, and in no acute distress. Pt denies chest pressure, SOB, fever,chills, N/V/D.  He continues to ice his left arm at incision sites intermittently.  He is currently getting HD via his graft (See Media) this morning.    Intake/Output Summary (Last 24 hours) at 3/11/2025 0964  Last data filed at 3/11/2025 0757  Gross per 24 hour   Intake 420 ml   Output --   Net 420 ml     Vital Signs: BP (!) 142/65   Pulse 64   Temp 98 °F (36.7 °C)   Resp 18   Wt 101.4 kg (223 lb 8.7 oz)   SpO2 96%   BMI 27.94 kg/m²    Temp (24hrs), Av.6 °F (36.4 °C), Min:97.1 °F (36.2 °C), Max:98 °F (36.7 °C)    Labs:   CBC:  Recent Labs     03/09/25  0417 03/10/25  0546 25  0506   WBC 5.1 5.0 5.2   HGB 8.6* 9.1* 8.3*   HCT 27.7* 30.2* 27.2*   .6* 105.2* 104.6*   * 111* 110*   INR 1.29* 1.16* 1.35*     BMP:   Recent Labs     03/09/25  0417 03/10/25  0546 25  0506    138 136   K 4.8 5.3* 5.8*   CL 99 98 96*   CO2 31* 27 25   BUN 37* 56* 76*   CREATININE 6.3* 8.4* 10.4*   MG 2.3 2.6 2.5     Last HgA1C:   Lab Results   Component Value Date    LABA1C 5.5 2023     Imaging:  3/10/25  PROCEDURE: VAS DUP UPPER EXTREMITY ARTERIES LEFT     CLINICAL INFORMATION: AV fistula; post-op evaluation following repair of AV  fistula, construction of a brachial to cephalic vein using a Flixene graft   .     COMPARISON: No prior study.     TECHNIQUE: Multiple permanent grayscale and color flow sonographic images of the  major arteries of the left upper extremity were obtained with attention to the  brachial/cephalic graft in the left arm. Spectral  arm elevated while in lying or sitting position.  Continue current therapy.  Pt on Coumadin with INR 1.35 today.  Ok to resume ASA 81 mg po daily from our standpoint.  We will sign off.    The plan of care was discussed in detail with Dr. Rao.    Edu Keys PA-C

## 2025-03-11 NOTE — DISCHARGE SUMMARY
Resident Discharge Summary (Hospitalist)      Patient: Trav Jennings 60 y.o. male  : 1965  MRN: [id number]   Account: [id number]   Patient's PCP: Radha Hou APRN - CNP    Admit Date: 3/6/2025   Discharge Date:   3/11/2025    Admitting Physician: No admitting provider for patient encounter.  Discharge Physician: David Villasenor DO       Discharge Diagnoses:  AV fistula hemorrhage: s/p emergent ligation 3/6/2025. Underwent dialysis x2 with no difficulties. Dressing changed on 3/9/2025.  Vascular duplex of the left arm reported patent graft.  Patient was restarted on Plavix and started on aspirin a day prior to discharge.  Patient did have 1 last session of dialysis on the day of discharge.  ESRD on HD: Follows up with Dr. Ayon OP. At home on  schedule.  Nephrology was consulted, Underwent dialysis on 3/7, 3/8, patient tolerated the dialysis session well.  Patient last dialysis session on the day prior to discharge.  Patient to continue his normal home dialysis schedule.  Patient to follow-up with Dr. Ayon as outpatient.  Acute on chronic macrocytic anemia secondary to AV fistula hemorrhage and ESRD: Hemoglobin 9.7 on admission.  Baseline 10.5-11.  Platelets 100, INR 1.29. On Retacrit 6000 units sq Monday/Wednesday/Friday.  Hemoglobin remained stable status post surgery.  Continue monitoring with repeat labs as an outpatient.   Hypocalcemia secondary to Sensipar: Secondary to Sensipar, holding. Continue calcitriol 0.5 mcg/ on .      Chronic Conditions (reviewed and stable unless otherwise stated)  Chronic hypoxic respiratory failure, secondary to lung resection, REZA.  Baseline at 3 L with sleep or as needed.  Maintain SpO2 >90%.  Sleep study (2016) noted CPAP requirement at 15 cm H2O.  Patient reports only using 3 to 4 L as needed oxygen via nasal cannula at night.   History of thoracic aortic aneurysm/type B: Status post stent graft placement  03:50 PM    HCT 27.2 03/11/2025 05:06 AM     03/11/2025 05:06 AM     Renal:    Lab Results   Component Value Date/Time     03/11/2025 05:06 AM    K 5.8 03/11/2025 05:06 AM    K 4.9 12/05/2024 04:48 AM    CL 96 03/11/2025 05:06 AM    CO2 25 03/11/2025 05:06 AM    BUN 76 03/11/2025 05:06 AM    CREATININE 10.4 03/11/2025 05:06 AM    CALCIUM 7.8 03/11/2025 05:06 AM    PHOS 5.5 03/08/2025 05:06 AM     Liver:   Lab Results   Component Value Date/Time    AST 17 03/06/2025 06:45 AM    ALT 9 03/06/2025 06:45 AM         Significant Diagnostic Studies    Radiology:   Vascular duplex upper extremity arteries left   Final Result   Dialysis graft widely patent..            **This report has been created using voice recognition software.  It may contain   minor errors which are inherent in voice recognition technology.**               Electronically signed by Dr. Yovany Perla      XR CHEST PORTABLE   Final Result      Mild pulmonary vascular congestion.      Small right pleural effusion.      Cardiomegaly.      This document has been electronically signed by: Tony Alexandre MD on    03/07/2025 03:26 AM           Consults:   IP CONSULT TO VASCULAR SURGERY  IP CONSULT TO NEPHROLOGY  PHARMACY TO DOSE WARFARIN    Disposition: Home  Condition at Discharge: Stable    Code Status:  Full Code     Patient Instructions:    Discharge lab work: NA  Activity: activity as tolerated  Diet: ADULT DIET; Regular; Low Potassium (Less than 3000 mg/day); Low Phosphorus (Less than 1000 mg)      Follow-up visits:   Radha Hou, APRN - CNP  [address] [facility] [id number] [phone]    Go on 3/14/2025  \[facility] [place] [facility]\"" Follow up.  Appointment scheduled @ 10:00am    Jamal Ayon MD  [address] [place] [address], [address]  [facility] [address]  [phone]    Go on 3/18/2025  hospital f/u, with AV fistual hemorrhage.  Dialysis appointments scheduled  on Tues, Thur., Sat. @ 6:50am         Discharge Medications:      Medication List        START

## 2025-03-11 NOTE — PROGRESS NOTES
Clinical Pharmacy Note                                               Warfarin Discharge Recommendations    Patient discharged from [facility] today on warfarin.    Warfarin indication: Atrial fibrillation/Atrial flutter  INR goal during admission: 2.0-3.0  Previous home warfarin regimen: 7.5 mg daily  Interacting medications at discharge: Amiodarone, Sertraline, ASA  Coumadin 7.5 mg tabs    Hospital Warfarin Doses & INR Results    Date INR Warfarin Dose   3/6/25 1.8 7.5 mg   3/7/25   1.29 10 mg    3/8/25   1.30  not given    3/9/25  1.29 12.5 mg     3/10/25 1.16   10 mg        Recent INRs:  Recent Labs     03/11/25  0506   INR 1.35*     Warfarin Discharge Instructions:   Date Warfarin Dose   3/11/25 (today) 7.5 mg (1 tablet)   3/12/25 (tomorrow) 7.5 mg (1 tablet)   3/13/25 INR      Provider dosing warfarin:  [facility]   Next INR date: 3/13/25 @ 9:40 am      Teresa Albright PharmD, BCPS   3/11/2025  2:55 PM

## 2025-03-11 NOTE — DISCHARGE INSTRUCTIONS
Discharge Instructions Following Vascular Surgery for  [facility]  Pt Name: Trav Jennings  Medical Record Number: [id number]  Today's Date: 3/11/2025    ACTIVITY/EXERCISE   ? Slowly increase your activity after you go home.  Pace yourself, listen to your body.  If it hurts, stop.   ? It will take 3 to 4 weeks to feel like you did before surgery in terms of energy and strength.   ? No constricting items on arm that fistula procedure was performed.    ? Elevate arm to heart height while at rest to prevent swelling. Use stress ball while at rest to increase blood flow.      APPETITE   ? Your appetite might be decreased at first.  It should slowly improve.    TEMPERATURE   ?      Take your temperature at the same time each day.  Take your temperature at 8 a.m. (morning) and 8 p.m. (evening).     PAIN   ? You may have pain at the incision. Take your pain medications as ordered.    BOWEL HABITS   ? Constipation is common due to Pain medications and inactivity.   ? Try drinking prune juice, eating a well balanced diet including fruits, vegetables and whole grains.   ? If constipation persists, you may use any over-the-counter laxative, suppository or enema.    DRIVING AND RIDING IN A CAR INSTRUCTIONS  ?         No DRIVING while on prescription pain medication! If driving prior to surgery, may resume driving as soon as prescription pain medication is discontinued.     INCISIONS  ? Warning signs of infection: redness, warmth to touch, green colored pus, tender to touch.  ? Clean your incisions twice daily with soap. Ok to shower the day of procedure but do not bathe until 4 days post procedure.   ? Be sure to rinse the incision with water and pat dry with clean towels.  ?          If you have Steri strips, leave them in place until they fall off.  ?         Staples and stitches are normally removed in 10-14 days post op.     SMOKING   If you smoke, STOP.  Smoking will cause early graft

## 2025-03-11 NOTE — DISCHARGE INSTR - MEDS
Warfarin Discharge Instructions:   Date Warfarin Dose   3/11/25 (today) 7.5 mg (1 tablet)   3/12/25 (tomorrow) 7.5 mg (1 tablet)   3/13/25 INR      Provider dosing warfarin: [facility]   Next INR date: 3/13/25 @ 9:40 am

## 2025-03-11 NOTE — CARE COORDINATION
3/11/25, 2:08 PM EDT    Patient goals/plan/ treatment preferences discussed by  and .  Patient goals/plan/ treatment preferences reviewed with patient/ family.  Patient/ family verbalize understanding of discharge plan and are in agreement with goal/plan/treatment preferences.  Understanding was demonstrated using the teach back method.  AVS provided by RN at time of discharge, which includes all necessary medical information pertaining to the patients current course of illness, treatment, post-discharge goals of care, and treatment preferences.     Services At/After Discharge: Community Resources    Trav is returning home with wife. Current w [facility] clinic. O2 is at baseline (3-4 L ATC purchased himself). HD TRS at [facility] 0655. RTS transports. Declines HH.

## 2025-03-11 NOTE — FLOWSHEET NOTE
03/11/25 0627 03/11/25 1200   Vital Signs   BP (!) 142/65 (!) 155/87   Temp 98 °F (36.7 °C) 98 °F (36.7 °C)   Pulse 64 73   Respirations 18 18   Weight - Scale 101.4 kg (223 lb 8.7 oz) 96.9 kg (213 lb 10 oz)   Weight Method Standing scale Standing scale   Percent Weight Change 0 -4.44   Post-Hemodialysis Assessment   Post-Treatment Procedures  --  Blood returned;Access bleeding time < 10 minutes   Machine Disinfection Process  --  Acid/Vinegar Clean;Heat Disinfect;Exterior Machine Disinfection   Blood Volume Processed (Liters)  --  90.9 L   Dialyzer Clearance  --  Lightly streaked   Duration of Treatment (minutes)  --  240 minutes   Hemodialysis Intake (ml)  --  400 ml   Hemodialysis Output (ml)  --  4900 ml   NET Removed (ml)  --  4500   Tolerated Treatment  --  Good     4 hour treatment  complete.  4500 mL net UF removed.  Pt. tolerated well.  Blood returned following treatment.  Pressure held to needle access sites for 10 min x2.  Report provided to primary RN. Paperwork printed and placed in bin to be scanned in to EMR.

## 2025-03-11 NOTE — PLAN OF CARE
Problem: Respiratory - Adult  Goal: Clear lung sounds  Outcome: Progressing   Patient mutually agrees with goal of care

## 2025-03-11 NOTE — PLAN OF CARE
Problem: Chronic Conditions and Co-morbidities  Goal: Patient's chronic conditions and co-morbidity symptoms are monitored and maintained or improved  3/10/2025 2223 by Miesha Ramos, RN  Outcome: Progressing  Flowsheets (Taken 3/10/2025 2000)  Care Plan - Patient's Chronic Conditions and Co-Morbidity Symptoms are Monitored and Maintained or Improved:   Monitor and assess patient's chronic conditions and comorbid symptoms for stability, deterioration, or improvement   Collaborate with multidisciplinary team to address chronic and comorbid conditions and prevent exacerbation or deterioration   Update acute care plan with appropriate goals if chronic or comorbid symptoms are exacerbated and prevent overall improvement and discharge  3/10/2025 1039 by Farzaneh King, RN  Outcome: Progressing  Flowsheets (Taken 3/10/2025 0800)  Care Plan - Patient's Chronic Conditions and Co-Morbidity Symptoms are Monitored and Maintained or Improved:   Monitor and assess patient's chronic conditions and comorbid symptoms for stability, deterioration, or improvement   Collaborate with multidisciplinary team to address chronic and comorbid conditions and prevent exacerbation or deterioration   Update acute care plan with appropriate goals if chronic or comorbid symptoms are exacerbated and prevent overall improvement and discharge     Problem: Discharge Planning  Goal: Discharge to home or other facility with appropriate resources  3/10/2025 2223 by Miesha Ramos, RN  Outcome: Progressing  Flowsheets (Taken 3/10/2025 2000)  Discharge to home or other facility with appropriate resources:   Identify barriers to discharge with patient and caregiver   Arrange for needed discharge resources and transportation as appropriate   Identify discharge learning needs (meds, wound care, etc)  3/10/2025 1039 by Farzaneh King, RN  Outcome: Progressing  Flowsheets (Taken 3/10/2025 0800)  Discharge to home or other facility

## 2025-03-11 NOTE — PROGRESS NOTES
Discharge teaching and instructions for diagnosis/procedure of av fistula bleed completed with patient using teachback method. AVS reviewed. Printed prescriptions given to patient. Patient voiced understanding regarding prescriptions, follow up appointments, and care of self at home. Discharged in a wheelchair to  home with support per  cab

## 2025-03-11 NOTE — PROGRESS NOTES
CLINICAL PHARMACY: DISCHARGE MED RECONCILIATION/REVIEW    [facility] Select Patient?: No  Total # of Interventions Recommended: 2 - Updated Order #: 2   -   Total # Interventions Accepted: 2  Intervention Severity:   - Level 1 Intervention Present?: No   - Level 2 #: 0   - Level 3 #: 2   Time Spent (min): 30    Additional Documentation:    Teresa Albright PharmD, BCPS   3/11/2025  3:05 PM

## 2025-03-11 NOTE — PROGRESS NOTES
[facility]   Nephrology progress note  3/11/2025, 11:54 AM      Pt Name:    Trav Jennings  MRN:     [id number]     YOB: 1965  Admit Date:    3/6/2025  2:31 AM    Chief Complaint: Nephrology following for ESRD.    Subjective:  Patient was seen and examined this morning  Feels ok.   Hoping to get discharged today.        Objective:  24HR INTAKE/OUTPUT:    Intake/Output Summary (Last 24 hours) at 3/11/2025 1154  Last data filed at 3/11/2025 0757  Gross per 24 hour   Intake 420 ml   Output --   Net 420 ml         I/O last 3 completed shifts:  In: 650 [P.O.:650]  Out: 0   I/O this shift:  In: 120 [P.O.:120]  Out: -    Admission weight: 99.8 kg (220 lb 0.3 oz)  Wt Readings from Last 3 Encounters:   03/11/25 101.4 kg (223 lb 8.7 oz)   03/05/25 102.1 kg (225 lb)   01/27/25 100.8 kg (222 lb 3.2 oz)        Vitals :   Vitals:    03/11/25 0124 03/11/25 0441 03/11/25 0513 03/11/25 0627   BP:  131/61  (!) 142/65   Pulse:  60  64   Resp: 17 16 16 18   Temp:  97.1 °F (36.2 °C)  98 °F (36.7 °C)   TempSrc:  Oral     SpO2:  96%     Weight:    101.4 kg (223 lb 8.7 oz)       Physical examination  General Appearance: alert and cooperative with exam, appears comfortable, no distress  Mouth/Throat: Oral mucosa moist  Neck: No JVD  Lungs:  clear  Heart: S1, S2  Extremities: some edema noted    Medications:  Infusion:    sodium chloride       Meds:    senna  1 tablet Oral Nightly    polyethylene glycol  17 g Oral Daily    epoetin qiana-epbx  6,000 Units SubCUTAneous Once per day on Tuesday Thursday Saturday    folic acid  1 mg Oral Daily    albuterol sulfate HFA  2 puff Inhalation BID RT    sodium chloride flush  5-40 mL IntraVENous 2 times per day    amiodarone  200 mg Oral Daily    amLODIPine  5 mg Oral Daily    [Held by provider] aspirin  81 mg Oral Daily    atorvastatin  40 mg Oral Daily    calcitRIOL  0.5 mcg Oral Once per day on Monday Wednesday Friday    [Held by provider] cinacalcet  90 mg Oral Once  per day on Monday Wednesday Friday    cloNIDine  0.3 mg Oral TID    hydrALAZINE  100 mg Oral q8h    isosorbide mononitrate  120 mg Oral BID    labetalol  100 mg Oral 3 times per day    multivitamin  1 tablet Oral Daily    sertraline  150 mg Oral Daily    thiamine  100 mg Oral Daily    Vitamin D  5,000 Units Oral Daily    warfarin placeholder: dosing by pharmacy   Oral RX Placeholder     Meds prn: sodium chloride flush, sodium chloride, labetalol, hydrALAZINE, oxyCODONE **OR** oxyCODONE, ondansetron, albuterol sulfate HFA, hydrOXYzine pamoate, ondansetron, traZODone     Lab Data :  CBC:   Recent Labs     03/09/25  0417 03/10/25  0546 03/11/25  0506   WBC 5.1 5.0 5.2   HGB 8.6* 9.1* 8.3*   HCT 27.7* 30.2* 27.2*   * 111* 110*     CMP:  Recent Labs     03/09/25  0417 03/10/25  0546 03/11/25  0506    138 136   K 4.8 5.3* 5.8*   CL 99 98 96*   CO2 31* 27 25   BUN 37* 56* 76*   CREATININE 6.3* 8.4* 10.4*   GLUCOSE 108 88 95   CALCIUM 7.8* 8.0* 7.8*   MG 2.3 2.6 2.5     Hepatic:   No results for input(s): \"LABALBU\", \"AST\", \"ALT\", \"BILITOT\", \"ALKPHOS\" in the last 72 hours.    Invalid input(s): \"ALB\"        Assessment and Plan:  ESRD on HD TTS  HD today  Ok for DC after  Hyperkalemia: 2K bath with HD today  Hypocalcemia: holding sensipar for now   Massive AV Fistula hemorrhage s/p emergent ligation. Now has AVG which is working well. Duplex US showed widely patent  HTN : stable  Anemia: on KENYETTA  Afib  thrombocytopenia      D/W patient and resident hospitalist    Hermila Clayton, DO  [facility]    This report has been created using voice recognition software. It may contain minor errors which are inherent in voice recognition technology

## 2025-03-12 VITALS
SYSTOLIC BLOOD PRESSURE: 157 MMHG | HEART RATE: 71 BPM | TEMPERATURE: 97.9 F | RESPIRATION RATE: 18 BRPM | WEIGHT: 213.63 LBS | BODY MASS INDEX: 26.7 KG/M2 | DIASTOLIC BLOOD PRESSURE: 75 MMHG | OXYGEN SATURATION: 100 %

## 2025-03-13 ENCOUNTER — TELEPHONE (OUTPATIENT)
Age: 60
End: 2025-03-13

## 2025-03-13 NOTE — TELEPHONE ENCOUNTER
Jude Leon PA-C,    Patient was referred to [facility] December 2024 by inpatient internal medicine provider.  It appears patient may be following with you now. If you find appropriate, can we please have updated referral for patient to [facility]?    Or of note, since being referred 12/27/24, patient has been very noncompliant cancelling or no-showing 9 times and only attending 3 visits.  He is also a hemodialysis patient.  Per [facility] outpatient pharmacy, Eliquis copay would be $0 for patient.  If you find appropriate, could Eliquis be considered in this patient?    Please contact this clinic with any questions.    Thank you,  Cassie Ashford, PharmD, BCPS  Medication Management Clinic  [facility]  Anticoagulation Clinic  [phone] (phone)  [phone] (fax)

## 2025-03-13 NOTE — TELEPHONE ENCOUNTER
Pt had LEFT UPPER EXTREMITY LIGATION OF BRACHIOCEPHALIC FISTULA, LEFT UPPER EXTREMITY BRACHIOCEPHALIC GRAFT, EXCISION ANEURYSM LEFT UPPER EXTREMITY FISTULA on 3/6/25 with you. He is also scheduled for AV fistula revision vs graft on 4/7/25.     Does this case need to be cancelled?

## 2025-03-17 ENCOUNTER — HOSPITAL ENCOUNTER (INPATIENT)
Age: 60
LOS: 1 days | Discharge: HOME | DRG: 640 | End: 2025-03-21
Attending: STUDENT IN AN ORGANIZED HEALTH CARE EDUCATION/TRAINING PROGRAM
Payer: MEDICARE

## 2025-03-17 ENCOUNTER — APPOINTMENT (OUTPATIENT)
Dept: CT IMAGING | Age: 60
DRG: 640 | End: 2025-03-17
Payer: MEDICARE

## 2025-03-17 ENCOUNTER — APPOINTMENT (OUTPATIENT)
Dept: GENERAL RADIOLOGY | Age: 60
DRG: 640 | End: 2025-03-17
Payer: MEDICARE

## 2025-03-17 DIAGNOSIS — E87.5 HYPERKALEMIA: Primary | ICD-10-CM

## 2025-03-17 DIAGNOSIS — J18.9 PNEUMONIA OF RIGHT LOWER LOBE DUE TO INFECTIOUS ORGANISM: ICD-10-CM

## 2025-03-17 DIAGNOSIS — E87.5 ACUTE HYPERKALEMIA: ICD-10-CM

## 2025-03-17 DIAGNOSIS — N18.6 END STAGE RENAL DISEASE (HCC): ICD-10-CM

## 2025-03-17 LAB
BASOPHILS ABSOLUTE: 0 THOU/MM3 (ref 0–0.1)
BASOPHILS NFR BLD AUTO: 0.4 %
DEPRECATED RDW RBC AUTO: 54 FL (ref 35–45)
EOSINOPHIL NFR BLD AUTO: 2.6 %
EOSINOPHILS ABSOLUTE: 0.2 THOU/MM3 (ref 0–0.4)
ERYTHROCYTE [DISTWIDTH] IN BLOOD BY AUTOMATED COUNT: 14.6 % (ref 11.5–14.5)
HCT VFR BLD AUTO: 27.1 % (ref 42–52)
HGB BLD-MCNC: 8.3 GM/DL (ref 14–18)
IMM GRANULOCYTES # BLD AUTO: 0.02 THOU/MM3 (ref 0–0.07)
IMM GRANULOCYTES NFR BLD AUTO: 0.3 %
LYMPHOCYTES ABSOLUTE: 1 THOU/MM3 (ref 1–4.8)
LYMPHOCYTES NFR BLD AUTO: 13 %
MCH RBC QN AUTO: 31.3 PG (ref 26–33)
MCHC RBC AUTO-ENTMCNC: 30.6 GM/DL (ref 32.2–35.5)
MCV RBC AUTO: 102.3 FL (ref 80–94)
MONOCYTES ABSOLUTE: 0.7 THOU/MM3 (ref 0.4–1.3)
MONOCYTES NFR BLD AUTO: 9.5 %
NEUTROPHILS ABSOLUTE: 5.7 THOU/MM3 (ref 1.8–7.7)
NEUTROPHILS NFR BLD AUTO: 74.2 %
NRBC BLD AUTO-RTO: 0 /100 WBC
PLATELET # BLD AUTO: 105 THOU/MM3 (ref 130–400)
PMV BLD AUTO: 10.3 FL (ref 9.4–12.4)
RBC # BLD AUTO: 2.65 MILL/MM3 (ref 4.7–6.1)
WBC # BLD AUTO: 7.7 THOU/MM3 (ref 4.8–10.8)

## 2025-03-17 PROCEDURE — 83880 ASSAY OF NATRIURETIC PEPTIDE: CPT

## 2025-03-17 PROCEDURE — 93005 ELECTROCARDIOGRAM TRACING: CPT | Performed by: STUDENT IN AN ORGANIZED HEALTH CARE EDUCATION/TRAINING PROGRAM

## 2025-03-17 PROCEDURE — 82607 VITAMIN B-12: CPT

## 2025-03-17 PROCEDURE — 6360000002 HC RX W HCPCS

## 2025-03-17 PROCEDURE — 83550 IRON BINDING TEST: CPT

## 2025-03-17 PROCEDURE — 85025 COMPLETE CBC W/AUTO DIFF WBC: CPT

## 2025-03-17 PROCEDURE — 80053 COMPREHEN METABOLIC PANEL: CPT

## 2025-03-17 PROCEDURE — 82746 ASSAY OF FOLIC ACID SERUM: CPT

## 2025-03-17 PROCEDURE — 85610 PROTHROMBIN TIME: CPT

## 2025-03-17 PROCEDURE — 96365 THER/PROPH/DIAG IV INF INIT: CPT

## 2025-03-17 PROCEDURE — 99285 EMERGENCY DEPT VISIT HI MDM: CPT

## 2025-03-17 PROCEDURE — 36415 COLL VENOUS BLD VENIPUNCTURE: CPT

## 2025-03-17 PROCEDURE — 83540 ASSAY OF IRON: CPT

## 2025-03-17 PROCEDURE — 71045 X-RAY EXAM CHEST 1 VIEW: CPT

## 2025-03-17 PROCEDURE — 82728 ASSAY OF FERRITIN: CPT

## 2025-03-17 PROCEDURE — 84484 ASSAY OF TROPONIN QUANT: CPT

## 2025-03-17 PROCEDURE — 87636 SARSCOV2 & INF A&B AMP PRB: CPT

## 2025-03-17 RX ORDER — CALCIUM GLUCONATE 20 MG/ML
2000 INJECTION, SOLUTION INTRAVENOUS ONCE
Status: COMPLETED | OUTPATIENT
Start: 2025-03-17 | End: 2025-03-18

## 2025-03-17 RX ORDER — IOPAMIDOL 755 MG/ML
80 INJECTION, SOLUTION INTRAVASCULAR
Status: COMPLETED | OUTPATIENT
Start: 2025-03-17 | End: 2025-03-18

## 2025-03-17 RX ADMIN — CALCIUM GLUCONATE 2000 MG: 20 INJECTION, SOLUTION INTRAVENOUS at 23:56

## 2025-03-17 ASSESSMENT — PAIN SCALES - GENERAL
PAINLEVEL_OUTOF10: 6
PAINLEVEL_OUTOF10: 6

## 2025-03-17 ASSESSMENT — PAIN DESCRIPTION - LOCATION
LOCATION: CHEST
LOCATION: CHEST

## 2025-03-17 ASSESSMENT — PAIN DESCRIPTION - ORIENTATION
ORIENTATION: LEFT;MID
ORIENTATION: LEFT;MID

## 2025-03-17 ASSESSMENT — PAIN - FUNCTIONAL ASSESSMENT
PAIN_FUNCTIONAL_ASSESSMENT: 0-10
PAIN_FUNCTIONAL_ASSESSMENT: 0-10

## 2025-03-17 ASSESSMENT — PAIN DESCRIPTION - DESCRIPTORS: DESCRIPTORS: SHARP

## 2025-03-18 LAB
ALBUMIN SERPL BCG-MCNC: 3.7 G/DL (ref 3.4–4.9)
ALP SERPL-CCNC: 93 U/L (ref 40–129)
ALT SERPL W/O P-5'-P-CCNC: 7 U/L (ref 10–50)
ANION GAP SERPL CALC-SCNC: 13 MEQ/L (ref 8–16)
ANION GAP SERPL CALC-SCNC: 13 MEQ/L (ref 8–16)
ANION GAP SERPL CALC-SCNC: 14 MEQ/L (ref 8–16)
APTT PPP: 72.4 SECONDS (ref 22–38)
AST SERPL-CCNC: 24 U/L (ref 10–50)
BILIRUB SERPL-MCNC: 0.3 MG/DL (ref 0.3–1.2)
BUN SERPL-MCNC: 51 MG/DL (ref 8–23)
BUN SERPL-MCNC: 59 MG/DL (ref 8–23)
BUN SERPL-MCNC: 86 MG/DL (ref 8–23)
CA-I BLD ISE-SCNC: 1.15 MMOL/L (ref 1.12–1.32)
CALCIUM SERPL-MCNC: 8.4 MG/DL (ref 8.6–10)
CALCIUM SERPL-MCNC: 8.6 MG/DL (ref 8.6–10)
CALCIUM SERPL-MCNC: 9.2 MG/DL (ref 8.6–10)
CHLORIDE SERPL-SCNC: 101 MEQ/L (ref 98–111)
CHLORIDE SERPL-SCNC: 97 MEQ/L (ref 98–111)
CHLORIDE SERPL-SCNC: 99 MEQ/L (ref 98–111)
CO2 SERPL-SCNC: 24 MEQ/L (ref 22–29)
CO2 SERPL-SCNC: 27 MEQ/L (ref 22–29)
CO2 SERPL-SCNC: 29 MEQ/L (ref 22–29)
CREAT SERPL-MCNC: 10.3 MG/DL (ref 0.7–1.2)
CREAT SERPL-MCNC: 6.7 MG/DL (ref 0.7–1.2)
CREAT SERPL-MCNC: 7.7 MG/DL (ref 0.7–1.2)
DEPRECATED RDW RBC AUTO: 53.4 FL (ref 35–45)
EKG ATRIAL RATE: 77 BPM
EKG ATRIAL RATE: 79 BPM
EKG P AXIS: 53 DEGREES
EKG P AXIS: 59 DEGREES
EKG P-R INTERVAL: 222 MS
EKG P-R INTERVAL: 234 MS
EKG Q-T INTERVAL: 436 MS
EKG Q-T INTERVAL: 438 MS
EKG QRS DURATION: 130 MS
EKG QRS DURATION: 132 MS
EKG QTC CALCULATION (BAZETT): 495 MS
EKG QTC CALCULATION (BAZETT): 499 MS
EKG R AXIS: -39 DEGREES
EKG R AXIS: -42 DEGREES
EKG T AXIS: 88 DEGREES
EKG T AXIS: 93 DEGREES
EKG VENTRICULAR RATE: 77 BPM
EKG VENTRICULAR RATE: 79 BPM
ERYTHROCYTE [DISTWIDTH] IN BLOOD BY AUTOMATED COUNT: 14.6 % (ref 11.5–14.5)
FERRITIN SERPL IA-MCNC: 1486 NG/ML (ref 30–400)
FLUAV RNA RESP QL NAA+PROBE: NOT DETECTED
FLUBV RNA RESP QL NAA+PROBE: NOT DETECTED
FOLATE SERPL-MCNC: 13.8 NG/ML (ref 4.6–34.8)
GFR SERPL CREATININE-BSD FRML MDRD: 5 ML/MIN/1.73M2
GFR SERPL CREATININE-BSD FRML MDRD: 8 ML/MIN/1.73M2
GFR SERPL CREATININE-BSD FRML MDRD: 9 ML/MIN/1.73M2
GLUCOSE BLD STRIP.AUTO-MCNC: 173 MG/DL (ref 70–108)
GLUCOSE BLD STRIP.AUTO-MCNC: 97 MG/DL (ref 70–108)
GLUCOSE SERPL-MCNC: 111 MG/DL (ref 74–109)
GLUCOSE SERPL-MCNC: 86 MG/DL (ref 74–109)
GLUCOSE SERPL-MCNC: 91 MG/DL (ref 74–109)
HCT VFR BLD AUTO: 26.7 % (ref 42–52)
HEPARIN UNFRACTIONATED: 0.13 U/ML (ref 0.3–0.7)
HEPARIN UNFRACTIONATED: 0.45 U/ML (ref 0.3–0.7)
HGB BLD-MCNC: 8.3 GM/DL (ref 14–18)
INR PPP: 1.55 (ref 0.85–1.13)
INR PPP: 1.66 (ref 0.85–1.13)
IRON SATN MFR SERPL: 20 % (ref 20–50)
IRON SERPL-MCNC: 43 UG/DL (ref 61–157)
MAGNESIUM SERPL-MCNC: 2.3 MG/DL (ref 1.6–2.6)
MCH RBC QN AUTO: 31.4 PG (ref 26–33)
MCHC RBC AUTO-ENTMCNC: 31.1 GM/DL (ref 32.2–35.5)
MCV RBC AUTO: 101.1 FL (ref 80–94)
NT-PROBNP SERPL IA-MCNC: ABNORMAL PG/ML (ref 0–124)
OSMOLALITY SERPL CALC.SUM OF ELEC: 290.8 MOSMOL/KG (ref 275–300)
OSMOLALITY SERPL CALC.SUM OF ELEC: 303 MOSMOL/KG (ref 275–300)
PLATELET # BLD AUTO: 106 THOU/MM3 (ref 130–400)
PMV BLD AUTO: 9.9 FL (ref 9.4–12.4)
POTASSIUM SERPL-SCNC: 4.5 MEQ/L (ref 3.5–5.2)
POTASSIUM SERPL-SCNC: 5.6 MEQ/L (ref 3.5–5.2)
POTASSIUM SERPL-SCNC: 7 MEQ/L (ref 3.5–5.2)
POTASSIUM SERPL-SCNC: 7.7 MEQ/L (ref 3.5–5.2)
PROCALCITONIN SERPL IA-MCNC: 0.83 NG/ML (ref 0.01–0.09)
PROT SERPL-MCNC: 7.2 G/DL (ref 6.4–8.3)
RBC # BLD AUTO: 2.64 MILL/MM3 (ref 4.7–6.1)
SARS-COV-2 RNA RESP QL NAA+PROBE: NOT DETECTED
SODIUM SERPL-SCNC: 139 MEQ/L (ref 135–145)
TIBC SERPL-MCNC: 213 UG/DL (ref 171–450)
TROPONIN, HIGH SENSITIVITY: 70 NG/L (ref 0–12)
TROPONIN, HIGH SENSITIVITY: 81 NG/L (ref 0–12)
VIT B12 SERPL-MCNC: 406 PG/ML (ref 232–1245)
WBC # BLD AUTO: 6 THOU/MM3 (ref 4.8–10.8)

## 2025-03-18 PROCEDURE — 2500000003 HC RX 250 WO HCPCS

## 2025-03-18 PROCEDURE — 83735 ASSAY OF MAGNESIUM: CPT

## 2025-03-18 PROCEDURE — 36415 COLL VENOUS BLD VENIPUNCTURE: CPT

## 2025-03-18 PROCEDURE — 6360000004 HC RX CONTRAST MEDICATION

## 2025-03-18 PROCEDURE — 99222 1ST HOSP IP/OBS MODERATE 55: CPT | Performed by: STUDENT IN AN ORGANIZED HEALTH CARE EDUCATION/TRAINING PROGRAM

## 2025-03-18 PROCEDURE — 84132 ASSAY OF SERUM POTASSIUM: CPT

## 2025-03-18 PROCEDURE — 85027 COMPLETE CBC AUTOMATED: CPT

## 2025-03-18 PROCEDURE — 82948 REAGENT STRIP/BLOOD GLUCOSE: CPT

## 2025-03-18 PROCEDURE — 84484 ASSAY OF TROPONIN QUANT: CPT

## 2025-03-18 PROCEDURE — 96374 THER/PROPH/DIAG INJ IV PUSH: CPT

## 2025-03-18 PROCEDURE — 6360000002 HC RX W HCPCS: Performed by: STUDENT IN AN ORGANIZED HEALTH CARE EDUCATION/TRAINING PROGRAM

## 2025-03-18 PROCEDURE — G0378 HOSPITAL OBSERVATION PER HR: HCPCS

## 2025-03-18 PROCEDURE — 99222 1ST HOSP IP/OBS MODERATE 55: CPT | Performed by: INTERNAL MEDICINE

## 2025-03-18 PROCEDURE — 93010 ELECTROCARDIOGRAM REPORT: CPT | Performed by: INTERNAL MEDICINE

## 2025-03-18 PROCEDURE — 6370000000 HC RX 637 (ALT 250 FOR IP): Performed by: PHARMACIST

## 2025-03-18 PROCEDURE — 85730 THROMBOPLASTIN TIME PARTIAL: CPT

## 2025-03-18 PROCEDURE — 94640 AIRWAY INHALATION TREATMENT: CPT

## 2025-03-18 PROCEDURE — 80048 BASIC METABOLIC PNL TOTAL CA: CPT

## 2025-03-18 PROCEDURE — 6370000000 HC RX 637 (ALT 250 FOR IP)

## 2025-03-18 PROCEDURE — 2580000003 HC RX 258

## 2025-03-18 PROCEDURE — 96366 THER/PROPH/DIAG IV INF ADDON: CPT

## 2025-03-18 PROCEDURE — 96367 TX/PROPH/DG ADDL SEQ IV INF: CPT

## 2025-03-18 PROCEDURE — 6360000002 HC RX W HCPCS

## 2025-03-18 PROCEDURE — 85610 PROTHROMBIN TIME: CPT

## 2025-03-18 PROCEDURE — 90935 HEMODIALYSIS ONE EVALUATION: CPT

## 2025-03-18 PROCEDURE — 85520 HEPARIN ASSAY: CPT

## 2025-03-18 PROCEDURE — 84145 PROCALCITONIN (PCT): CPT

## 2025-03-18 PROCEDURE — 93005 ELECTROCARDIOGRAM TRACING: CPT | Performed by: STUDENT IN AN ORGANIZED HEALTH CARE EDUCATION/TRAINING PROGRAM

## 2025-03-18 PROCEDURE — 96375 TX/PRO/DX INJ NEW DRUG ADDON: CPT

## 2025-03-18 PROCEDURE — 82330 ASSAY OF CALCIUM: CPT

## 2025-03-18 RX ORDER — ALBUTEROL SULFATE 0.83 MG/ML
10 SOLUTION RESPIRATORY (INHALATION) ONCE
Status: COMPLETED | OUTPATIENT
Start: 2025-03-18 | End: 2025-03-18

## 2025-03-18 RX ORDER — DEXTROSE MONOHYDRATE 100 MG/ML
INJECTION, SOLUTION INTRAVENOUS CONTINUOUS PRN
Status: DISCONTINUED | OUTPATIENT
Start: 2025-03-18 | End: 2025-03-21 | Stop reason: HOSPADM

## 2025-03-18 RX ORDER — AMLODIPINE BESYLATE 5 MG/1
5 TABLET ORAL DAILY
Status: DISCONTINUED | OUTPATIENT
Start: 2025-03-18 | End: 2025-03-18

## 2025-03-18 RX ORDER — WARFARIN SODIUM 7.5 MG/1
11.25 TABLET ORAL
Status: COMPLETED | OUTPATIENT
Start: 2025-03-18 | End: 2025-03-18

## 2025-03-18 RX ORDER — LABETALOL 100 MG/1
100 TABLET, FILM COATED ORAL EVERY 8 HOURS SCHEDULED
Status: DISCONTINUED | OUTPATIENT
Start: 2025-03-18 | End: 2025-03-18

## 2025-03-18 RX ORDER — AMLODIPINE BESYLATE 5 MG/1
5 TABLET ORAL DAILY
Status: DISCONTINUED | OUTPATIENT
Start: 2025-03-18 | End: 2025-03-21 | Stop reason: HOSPADM

## 2025-03-18 RX ORDER — HYDROXYZINE PAMOATE 50 MG/1
50 CAPSULE ORAL 3 TIMES DAILY PRN
Status: DISCONTINUED | OUTPATIENT
Start: 2025-03-18 | End: 2025-03-21 | Stop reason: HOSPADM

## 2025-03-18 RX ORDER — ATORVASTATIN CALCIUM 40 MG/1
40 TABLET, FILM COATED ORAL DAILY
Status: DISCONTINUED | OUTPATIENT
Start: 2025-03-18 | End: 2025-03-21 | Stop reason: HOSPADM

## 2025-03-18 RX ORDER — HYDRALAZINE HYDROCHLORIDE 20 MG/ML
10 INJECTION INTRAMUSCULAR; INTRAVENOUS ONCE
Status: COMPLETED | OUTPATIENT
Start: 2025-03-18 | End: 2025-03-18

## 2025-03-18 RX ORDER — HEPARIN SODIUM 1000 [USP'U]/ML
4000 INJECTION, SOLUTION INTRAVENOUS; SUBCUTANEOUS ONCE
Status: COMPLETED | OUTPATIENT
Start: 2025-03-18 | End: 2025-03-18

## 2025-03-18 RX ORDER — HYDRALAZINE HYDROCHLORIDE 50 MG/1
100 TABLET, FILM COATED ORAL EVERY 8 HOURS
Status: DISCONTINUED | OUTPATIENT
Start: 2025-03-18 | End: 2025-03-21 | Stop reason: HOSPADM

## 2025-03-18 RX ORDER — HEPARIN SODIUM 1000 [USP'U]/ML
2000 INJECTION, SOLUTION INTRAVENOUS; SUBCUTANEOUS PRN
Status: DISCONTINUED | OUTPATIENT
Start: 2025-03-18 | End: 2025-03-19

## 2025-03-18 RX ORDER — HEPARIN SODIUM 1000 [USP'U]/ML
4000 INJECTION, SOLUTION INTRAVENOUS; SUBCUTANEOUS PRN
Status: DISCONTINUED | OUTPATIENT
Start: 2025-03-18 | End: 2025-03-19

## 2025-03-18 RX ORDER — ISOSORBIDE MONONITRATE 60 MG/1
120 TABLET, EXTENDED RELEASE ORAL 2 TIMES DAILY
Status: DISCONTINUED | OUTPATIENT
Start: 2025-03-18 | End: 2025-03-21 | Stop reason: HOSPADM

## 2025-03-18 RX ORDER — ASPIRIN 81 MG/1
81 TABLET ORAL DAILY
Status: DISCONTINUED | OUTPATIENT
Start: 2025-03-18 | End: 2025-03-21 | Stop reason: HOSPADM

## 2025-03-18 RX ORDER — LABETALOL 100 MG/1
100 TABLET, FILM COATED ORAL EVERY 8 HOURS SCHEDULED
Status: DISCONTINUED | OUTPATIENT
Start: 2025-03-18 | End: 2025-03-21 | Stop reason: HOSPADM

## 2025-03-18 RX ORDER — AMIODARONE HYDROCHLORIDE 200 MG/1
200 TABLET ORAL DAILY
Status: DISCONTINUED | OUTPATIENT
Start: 2025-03-18 | End: 2025-03-21 | Stop reason: HOSPADM

## 2025-03-18 RX ORDER — ALBUTEROL SULFATE 0.83 MG/ML
5 SOLUTION RESPIRATORY (INHALATION) EVERY 6 HOURS PRN
Status: DISCONTINUED | OUTPATIENT
Start: 2025-03-18 | End: 2025-03-21 | Stop reason: HOSPADM

## 2025-03-18 RX ORDER — HEPARIN SODIUM 10000 [USP'U]/100ML
5-30 INJECTION, SOLUTION INTRAVENOUS CONTINUOUS
Status: DISCONTINUED | OUTPATIENT
Start: 2025-03-18 | End: 2025-03-19

## 2025-03-18 RX ORDER — FOLIC ACID 1 MG/1
1 TABLET ORAL DAILY
Status: DISCONTINUED | OUTPATIENT
Start: 2025-03-18 | End: 2025-03-21 | Stop reason: HOSPADM

## 2025-03-18 RX ORDER — GLUCAGON 1 MG/ML
1 KIT INJECTION PRN
Status: DISCONTINUED | OUTPATIENT
Start: 2025-03-18 | End: 2025-03-21 | Stop reason: HOSPADM

## 2025-03-18 RX ADMIN — ALBUTEROL SULFATE 10 MG: 2.5 SOLUTION RESPIRATORY (INHALATION) at 00:28

## 2025-03-18 RX ADMIN — SODIUM ZIRCONIUM CYCLOSILICATE 10 G: 10 POWDER, FOR SUSPENSION ORAL at 01:22

## 2025-03-18 RX ADMIN — CLONIDINE HYDROCHLORIDE 0.3 MG: 0.2 TABLET ORAL at 13:45

## 2025-03-18 RX ADMIN — DOXYCYCLINE 100 MG: 100 INJECTION, POWDER, LYOPHILIZED, FOR SOLUTION INTRAVENOUS at 23:54

## 2025-03-18 RX ADMIN — HYDRALAZINE HYDROCHLORIDE 100 MG: 50 TABLET ORAL at 13:45

## 2025-03-18 RX ADMIN — CLONIDINE HYDROCHLORIDE 0.3 MG: 0.2 TABLET ORAL at 21:59

## 2025-03-18 RX ADMIN — WARFARIN SODIUM 11.25 MG: 7.5 TABLET ORAL at 18:00

## 2025-03-18 RX ADMIN — AMIODARONE HYDROCHLORIDE 200 MG: 200 TABLET ORAL at 10:06

## 2025-03-18 RX ADMIN — DOXYCYCLINE 100 MG: 100 INJECTION, POWDER, LYOPHILIZED, FOR SOLUTION INTRAVENOUS at 01:17

## 2025-03-18 RX ADMIN — IOPAMIDOL 80 ML: 755 INJECTION, SOLUTION INTRAVENOUS at 01:06

## 2025-03-18 RX ADMIN — HEPARIN SODIUM 10 UNITS/KG/HR: 10000 INJECTION, SOLUTION INTRAVENOUS at 17:49

## 2025-03-18 RX ADMIN — ISOSORBIDE MONONITRATE 120 MG: 60 TABLET, EXTENDED RELEASE ORAL at 10:06

## 2025-03-18 RX ADMIN — AMLODIPINE BESYLATE 5 MG: 5 TABLET ORAL at 07:08

## 2025-03-18 RX ADMIN — HYDRALAZINE HYDROCHLORIDE 100 MG: 50 TABLET ORAL at 07:08

## 2025-03-18 RX ADMIN — LABETALOL HYDROCHLORIDE 100 MG: 100 TABLET, FILM COATED ORAL at 13:45

## 2025-03-18 RX ADMIN — CLONIDINE HYDROCHLORIDE 0.3 MG: 0.2 TABLET ORAL at 07:08

## 2025-03-18 RX ADMIN — LABETALOL HYDROCHLORIDE 100 MG: 100 TABLET, FILM COATED ORAL at 07:08

## 2025-03-18 RX ADMIN — LABETALOL HYDROCHLORIDE 100 MG: 100 TABLET, FILM COATED ORAL at 21:59

## 2025-03-18 RX ADMIN — HEPARIN SODIUM 4000 UNITS: 1000 INJECTION INTRAVENOUS; SUBCUTANEOUS at 17:46

## 2025-03-18 RX ADMIN — ASPIRIN 81 MG: 81 TABLET, COATED ORAL at 10:10

## 2025-03-18 RX ADMIN — WATER 1000 MG: 1 INJECTION INTRAMUSCULAR; INTRAVENOUS; SUBCUTANEOUS at 00:36

## 2025-03-18 RX ADMIN — FOLIC ACID 1 MG: 1 TABLET ORAL at 10:06

## 2025-03-18 RX ADMIN — HYDRALAZINE HYDROCHLORIDE 100 MG: 50 TABLET ORAL at 21:59

## 2025-03-18 RX ADMIN — HYDRALAZINE HYDROCHLORIDE 10 MG: 20 INJECTION INTRAMUSCULAR; INTRAVENOUS at 03:51

## 2025-03-18 RX ADMIN — DOXYCYCLINE 100 MG: 100 INJECTION, POWDER, LYOPHILIZED, FOR SOLUTION INTRAVENOUS at 12:29

## 2025-03-18 RX ADMIN — ATORVASTATIN CALCIUM 40 MG: 40 TABLET, FILM COATED ORAL at 10:07

## 2025-03-18 RX ADMIN — ISOSORBIDE MONONITRATE 120 MG: 60 TABLET, EXTENDED RELEASE ORAL at 21:59

## 2025-03-18 ASSESSMENT — PAIN - FUNCTIONAL ASSESSMENT
PAIN_FUNCTIONAL_ASSESSMENT: NONE - DENIES PAIN
PAIN_FUNCTIONAL_ASSESSMENT: 0-10

## 2025-03-18 ASSESSMENT — PAIN DESCRIPTION - LOCATION: LOCATION: CHEST

## 2025-03-18 ASSESSMENT — PAIN SCALES - GENERAL: PAINLEVEL_OUTOF10: 6

## 2025-03-18 ASSESSMENT — PAIN DESCRIPTION - ORIENTATION: ORIENTATION: LEFT;MID

## 2025-03-18 NOTE — H&P
Internal Medicine Resident H&P Note      Patient:  Trav Jennings    Unit/Bed:19/019A  YOB: 1965  MRN: [id number]   Acct: [id number]   PCP: Radha Hou APRN - CNP  Date of Admission: 3/17/2025 0   Date of Service: Pt seen/examined on 03/18/25      Assessment/Plan:  Hyperkalemia:  Potassium 7.7 on arrival.  Got 10 mg of Lokelma, 2 g of calcium gluconate, 10 mg albuterol. Repeat 7.0.  Refused IV insulin due to allergy. Does have peaked T-waves on EKG. Plan for emergent dialysis. Repeat labs after dialysis.  Hypertensive urgency: Significant hypertension likely 2/2 incomplete dialysis session and missed his evening BP meds.  Home medications include amlodipine, clonidine, hydralazine, Imdur, labetalol.   Suspect blood pressure to improve with dialysis.  Will continue home meds.   ESRD on HD: Follows with Dr. Ayon as OP.  T/TH/Sat. Had AV fistula hemorrhage earlier in the month and had AVG placed. Bruit on auscultation. Nephro consulted in the ED.  Planning emergent dialysis for hyperkalemia as above.  Nephrology following.  Appreciate recommendations.  CAP: Given sputum production, subjective fevers and chills, as well as a right lower lobe consolidation will continue with ceftriaxone and doxycycline.   Chronic macrocytic anemia: Near recent baseline 8.3.  Recently mated and discharged for an AV fistula hemorrhage.  On Retacrit MWF.  Continue daily CBC.  No recent iron labs.  Will check iron labs B12/folate  Paroxysmal A-fib: [id number] 4.  Continue amiodarone 20 mg daily.  Is on home warfarin.  Check INR. Pharmacy to dose coumadin.   Elevated troponin: 2/2 demand from HTN. Trop at 70, below baseline. EKG does show lateral ST depressions that are similar to previous. Repeat trop. No current CP. SOB improved.  HFpEF: acute exacarbation. 2/2 ESRD as above. Dialysis.   hx thoracic aortic aneurysm type B: S/p stent graft placement extending from proximal transverse aorta arch to

## 2025-03-18 NOTE — ED PROVIDER NOTES
[facility] EMERGENCY DEPARTMENT  EMERGENCY DEPARTMENT ENCOUNTER          Pt Name: Trav Jennings  MRN: [id number]  Birthdate 1965  Date of evaluation: 3/17/2025  Physician: Dickson Bates MD  Supervising Attending Physician: Burak Oliveros DO       CHIEF COMPLAINT       Chief Complaint   Patient presents with    Shortness of Breath    Chest Pain         HISTORY OF PRESENT ILLNESS    HPI  Trav Jennings is a 60 y.o. male who presents to the emergency department from home, brought in by EMS for evaluation of shortness of breath.  Patient reports over the past couple days he has had worsening shortness of breath.  He states that he has been going to dialysis.  Patient does dialysis Tuesday, Thursday and Saturday.  He is unsure how much fluid they take off on Saturday.  States however that since then he has been short of breath.  He states last week that he did come in to the emergency department and had a stent placed in his left fistula.  He stated that since then he has had pain and swelling of that left upper arm.  He states that he now has pain in his left chest.  He does report subjective fever and chills.  Denies any abdominal pain.  He no longer produces any urine.  The patient has no other acute complaints at this time.          PAST MEDICAL AND SURGICAL HISTORY     Past Medical History:   Diagnosis Date    AAA (abdominal aortic aneurysm)     NURIS (acute kidney injury) 9/24/2015    Anemia associated with chronic renal failure     Arthritis     stated in hands    Cocaine abuse (HCC) 5/10/2014    Depression     Diabetes mellitus (HCC)     pt states he no longer has diabetes he has lost alot of davion.     FSGS (focal segmental glomerulosclerosis) 5/23/2013    Hemodialysis patient 10/17/2016    on hemodialysis with [facility]    Hemorrhoids 1/16/2012    History of blood transfusion     Hyperlipidemia     Hyperphosphatemia 5/21/2016    Hypertension     Left  (LOKELMA) oral suspension 10 g, 10 g, Oral, Once, Dickson Bates MD    doxycycline (VIBRAMYCIN) 100 mg in sodium chloride 0.9 % 100 mL IVPB, 100 mg, IntraVENous, Q12H, Dickson Bates MD, Last Rate: 100 mL/hr at 03/18/25 0117, 100 mg at 03/18/25 0117    Current Outpatient Medications:     amLODIPine (NORVASC) 5 MG tablet, Take 1 tablet by mouth daily, Disp: 90 tablet, Rfl: 0    cloNIDine (CATAPRES) 0.3 MG tablet, Take 1 tablet by mouth 3 times daily, Disp: 270 tablet, Rfl: 0    hydrALAZINE (APRESOLINE) 100 MG tablet, Take 1 tablet by mouth every 8 (eight) hours, Disp: 270 tablet, Rfl: 0    isosorbide mononitrate (IMDUR) 120 MG extended release tablet, Take 1 tablet by mouth in the morning and at bedtime, Disp: 180 tablet, Rfl: 0    labetalol (NORMODYNE) 100 MG tablet, Take 1 tablet by mouth every 8 hours, Disp: 270 tablet, Rfl: 0    folic acid (FOLVITE) 1 MG tablet, Take 1 tablet by mouth daily, Disp: 30 tablet, Rfl: 3    warfarin (COUMADIN) 7.5 MG tablet, Take as directed by [facility] Medication Management 30 tabs = 30 DS, Disp: 30 tablet, Rfl: 0    atorvastatin (LIPITOR) 80 MG tablet, Take 0.5 tablets by mouth daily, Disp: , Rfl:     hydrOXYzine pamoate (VISTARIL) 50 MG capsule, Take 1 capsule by mouth 3 times daily as needed for Anxiety, Disp: , Rfl:     amiodarone (CORDARONE) 200 MG tablet, Take 1 tablet by mouth daily, Disp: 30 tablet, Rfl: 0    aspirin 81 MG EC tablet, Take 1 tablet by mouth daily, Disp: 30 tablet, Rfl: 0    Facility-Administered Medications Ordered in Other Encounters:     HYDROmorphone (DILAUDID) injection 1 mg, 1 mg, IntraVENous, Once, Yovany Perla MD    midazolam (VERSED) injection 1 mg, 1 mg, IntraVENous, Once, Yovany Perla MD    Previous Medications    AMIODARONE (CORDARONE) 200 MG TABLET    Take 1 tablet by mouth daily    AMLODIPINE (NORVASC) 5 MG TABLET    Take 1 tablet by mouth daily    ASPIRIN 81 MG EC TABLET    Take 1 tablet by mouth daily    ATORVASTATIN (LIPITOR) 80 MG

## 2025-03-18 NOTE — PROGRESS NOTES
Spiritual Health History and Assessment/Progress Note  [facility]    Initial Encounter, Spiritual/Emotional Needs,  ,  ,      Name: Trav Jennings MRN: [id number]    Age: 60 y.o.     Sex: male   Language: English   [employer]: [employer]ism   Hyperkalemia     Date: 3/18/2025            Total Time Calculated: (P) 10 min              Spiritual Assessment began in [facility] CCU 3A        Referral/Consult From: Rounding   Encounter Overview/Reason: Initial Encounter, Spiritual/Emotional Needs  Service Provided For: Patient    Elizabeth, Belief, Meaning:   Patient identifies as spiritual  Family/Friends identify as spiritual      Importance and Influence:  Patient has spiritual/personal beliefs that influence decisions regarding their health  Family/Friends have spiritual/personal beliefs that influence decisions regarding the patient's health    Community:  Patient feels well-supported. Support system includes: Spouse/Partner  Family/Friends are connected with a spiritual community:    Assessment and Plan of Care:   In my encounter with the 60 yr old patient, while rounding  the unit 3A,  I provided spiritual care to patient through conversation, I also came to assess the patient's spiritual needs present. The pt was admitted due to hyperkalemia.     I provided prayer, emotional support and words of comfort.  provided a listening presence and encouraged pt to share their beliefs and how I can support them during their hospitalization.        Patient Interventions include: Facilitated expression of thoughts and feelings  Family/Friends Interventions include: Facilitated expression of thoughts and feelings    Patient Plan of Care: Spiritual Care available upon further referral  Family/Friends Plan of Care: Spiritual Care available upon further referral    Electronically signed by FLAVIA Arambula on 3/18/2025 at 2:46 PM

## 2025-03-18 NOTE — ED NOTES
Pt is sitting up in bed, pt asks to be weighed on standing scale. This RN collected and sent Potassium blood at this time. Pt is breathing regular and unalbored on 5L NC at this time, pt asked NC to be increased to 5 for comfort. Pt has no pain at this time, pt is hypertensive other vitals stable. Call light within reach.

## 2025-03-18 NOTE — FLOWSHEET NOTE
03/18/25 0600   Vital Signs   BP (!) 190/93   Temp 98.2 °F (36.8 °C)   Pulse 92   Respirations 18   Post-Hemodialysis Assessment   Post-Treatment Procedures Blood returned;Access bleeding time < 10 minutes   Machine Disinfection Process Exterior Machine Disinfection   Blood Volume Processed (Liters) 57 L   Dialyzer Clearance Lightly streaked   Duration of Treatment (minutes) 150 minutes   Hemodialysis Intake (ml) 400 ml   Hemodialysis Output (ml) 3400 ml   NET Removed (ml) 3000   Tolerated Treatment Good     2.5 hour treatment complete. 3 L net UF removed. Pt. tolerated well. Blood returned following treatment. Pressure held to needle access sites for 10 min. x 2.  Report provided to Primary RN.  Paperwork printed and placed in bin to be scanned in to EMR.

## 2025-03-18 NOTE — PLAN OF CARE
Problem: Chronic Conditions and Co-morbidities  Goal: Patient's chronic conditions and co-morbidity symptoms are monitored and maintained or improved  Outcome: Progressing  Flowsheets (Taken 3/18/2025 1050)  Care Plan - Patient's Chronic Conditions and Co-Morbidity Symptoms are Monitored and Maintained or Improved: Monitor and assess patient's chronic conditions and comorbid symptoms for stability, deterioration, or improvement  Note: Hd patient , nephro following      Problem: Discharge Planning  Goal: Discharge to home or other facility with appropriate resources  Outcome: Progressing  Flowsheets (Taken 3/18/2025 1050)  Discharge to home or other facility with appropriate resources:   Identify barriers to discharge with patient and caregiver   Arrange for needed discharge resources and transportation as appropriate  Note: Patient from home      Problem: Safety - Adult  Goal: Free from fall injury  Outcome: Progressing  Flowsheets (Taken 3/18/2025 1050)  Free From Fall Injury: Instruct family/caregiver on patient safety

## 2025-03-18 NOTE — ED NOTES
Patient transported to  [facility] by cart in stable condition.   Patient monitored on cardiac telemetry.   Patient on 5 LPM O2 via nasal canula.   IV infusing and line is patent.

## 2025-03-18 NOTE — CONSULTS
[facility]    [address], [place] [address]  [phone]     Hospital Consult  3/18/2025 8:45 AM    Pt Name:    Trav Jennings  MRN:     [id number]   [id number]  YOB: 1965  Admit Date:    3/17/2025 10:48 PM  Primary Care Physician:  Radha Hou APRN - CNP    CSN Number:   [id number]    Reason for Consult:  ESRD, hyperkalemia  Requesting provider:  Hospitalist    History:   The patient is a 60 y.o. -American male with history of ESRD on Tuesdays, Thursdays and Saturdays, chronic volume overload, hypertension who presented overnight with complaints of shortness of breath for last few days.  Patient reports that he went to his dialysis treatment on Saturday but he is unsure how much fluid was removed.  Reports over the weekend he started becoming short of breath.  Also had some associated nausea and headaches.  No chest pain.  No dizziness or lightheadedness.  In the emergency room noted to have potassium of 7.7.  Called by emergency room last night.  Case discussed with ER staff and arrangements were made for emergent dialysis treatment.  Patient underwent 2.5 hrs of dialysis treatment overnight with 2K bath.  Feels better this morning.  On nasal cannula.    Past Medical History:  Past Medical History:   Diagnosis Date    AAA (abdominal aortic aneurysm)     NURIS (acute kidney injury) 9/24/2015    Anemia associated with chronic renal failure     Arthritis     stated in hands    Cocaine abuse (HCC) 5/10/2014    Depression     Diabetes mellitus (HCC)     pt states he no longer has diabetes he has lost alot of davion.     FSGS (focal segmental glomerulosclerosis) 5/23/2013    Hemodialysis patient 10/17/2016    on hemodialysis with [facility]    Hemorrhoids 1/16/2012    History of blood transfusion     Hyperlipidemia     Hyperphosphatemia 5/21/2016    Hypertension     Left renal artery stenosis 5/22/2014    Monoclonal (M) protein disease,  Vaping status: Never Used   Substance and Sexual Activity    Alcohol use: Yes     Alcohol/week: 20.0 standard drinks of alcohol     Types: 20 Cans of beer per week    Drug use: Yes     Types: Cocaine     Comment: \"I haven't got high since but when I leave here I might\"    Sexual activity: Yes     Partners: Female   Other Topics Concern    Not on file   Social History Narrative    Not on file     Social Drivers of Health     Financial Resource Strain: Not on file   Food Insecurity: Food Insecurity Present (3/18/2025)    Hunger Vital Sign     Worried About Running Out of Food in the Last Year: Often true     Ran Out of Food in the Last Year: Often true   Transportation Needs: No Transportation Needs (3/18/2025)    PRAPARE - Transportation     Lack of Transportation (Medical): No     Lack of Transportation (Non-Medical): No   Recent Concern: Transportation Needs - Unmet Transportation Needs (12/23/2024)    PRAPARE - Transportation     Lack of Transportation (Medical): Yes     Lack of Transportation (Non-Medical): Yes   Physical Activity: Not on file   Stress: Not on file   Social Connections: Not on file   Intimate Partner Violence: Not on file   Housing Stability: High Risk (3/18/2025)    Housing Stability Vital Sign     Unable to Pay for Housing in the Last Year: No     Number of Times Moved in the Last Year: 0     Homeless in the Last Year: Yes       Home Meds:  Prior to Admission medications    Medication Sig Start Date End Date Taking? Authorizing Provider   amLODIPine (NORVASC) 5 MG tablet Take 1 tablet by mouth daily 3/11/25  Yes David Villasenor DO   cloNIDine (CATAPRES) 0.3 MG tablet Take 1 tablet by mouth 3 times daily 3/11/25  Yes David Villasenor DO   hydrALAZINE (APRESOLINE) 100 MG tablet Take 1 tablet by mouth every 8 (eight) hours 3/11/25 6/9/25 Yes David Villasenor DO   isosorbide mononitrate (IMDUR) 120 MG extended release tablet Take 1 tablet by mouth in the morning and at bedtime 3/11/25 6/9/25

## 2025-03-18 NOTE — CARE COORDINATION
03/18/25 1021   Readmission Assessment   Number of Days since last admission? 1-7 days   Previous Disposition Home with Family   Who is being Interviewed Patient   What was the patient's/caregiver's perception as to why they think they needed to return back to the hospital? Other (Comment)  (swollen and SOB)   Did you visit your Primary Care Physician after you left the hospital, before you returned this time? No   Why weren't you able to visit your PCP? Other (Comment)  (appt scheduled; returned prior to)   Did you see a specialist, such as Cardiac, Pulmonary, Orthopedic Physician, etc. after you left the hospital? No   Who advised the patient to return to the hospital? Self-referral   Does the patient report anything that got in the way of taking their medications? No   In our efforts to provide the best possible care to you and others like you, can you think of anything that we could have done to help you after you left the hospital the first time, so that you might not have needed to return so soon? Other (Comment)  (make sure my fistula is working correctly; I think I was discharged too soon)

## 2025-03-18 NOTE — ED NOTES
Jose L from CT states to this RN that pt is refusing CT due to pt feeling SOB when laying down in CT scan, Jose L states they attempted to sit pt up and reposition but pt states to CT \"I just need to get dialysis.\" This RN and Jose L from CT made providers aware.

## 2025-03-18 NOTE — PROGRESS NOTES
Patient resting in bed, no change from previous assessment. Call light in reach. Bed in low position.

## 2025-03-18 NOTE — ED NOTES
ED to inpatient nurses report      Chief Complaint:  Chief Complaint   Patient presents with    Shortness of Breath    Chest Pain     Present to ED from: home    MOA:     LOC: alert and orientated to name, place, date  Mobility: Requires assistance * 1  Oxygen Baseline: 4L NC    Current needs required: 5L NC     Code Status:   Prior    What abnormal results were found and what did you give/do to treat them? Hyperkalemia , Pneumonia  Any procedures or intervention occur? See MAR    Mental Status:  Level of Consciousness: Alert (0)    Psych Assessment:        Vitals:  Patient Vitals for the past 24 hrs:   BP Temp Temp src Pulse Resp SpO2 Height Weight   03/18/25 0125 (!) 207/96 -- -- 82 23 100 % -- --   03/18/25 0034 (!) 202/91 -- -- 87 16 100 % -- --   03/17/25 2357 (!) 204/95 -- -- 76 26 94 % -- --   03/17/25 2308 (!) 213/89 -- -- -- -- -- -- --   03/17/25 2300 (!) 159/131 98.7 °F (37.1 °C) Oral 75 17 100 % 1.905 m (6' 3\") 96.6 kg (213 lb)        LDAs:   Peripheral IV 03/17/25 Posterior;Right Hand (Active)   Site Assessment Clean, dry & intact 03/18/25 0125   Line Status Flushed;Normal saline locked 03/18/25 0125   Phlebitis Assessment No symptoms 03/18/25 0125   Infiltration Assessment 0 03/18/25 0125   Dressing Status Clean, dry & intact 03/18/25 0125       Peripheral IV 03/18/25 Right;Anterior Forearm (Active)   Site Assessment Clean, dry & intact 03/18/25 0125   Line Status Flushed;Infusing 03/18/25 0125   Phlebitis Assessment No symptoms 03/18/25 0125   Infiltration Assessment 0 03/18/25 0125   Dressing Status Clean, dry & intact 03/18/25 0125       Ambulatory Status:  Presents to emergency department  because of falls (Syncope, seizure, or loss of consciousness): No, Age > 70: No, Altered Mental Status, Intoxication with alcohol or substance confusion (Disorientation, impaired judgment, poor safety awaremess, or inability to follow instructions): No, Impaired Mobility: Ambulates or transfers with assistive  Lung Resection performed by Ralph Becerra MD at [facility] OR    VASCULAR SURGERY Left 07/08/2016    AV Fistula    VASCULAR SURGERY Right 1990    Surgery on Achilles tendon       PAST MEDICAL HISTORY       Past Medical History:   Diagnosis Date    AAA (abdominal aortic aneurysm)     NURIS (acute kidney injury) 9/24/2015    Anemia associated with chronic renal failure     Arthritis     stated in hands    Cocaine abuse (HCC) 5/10/2014    Depression     Diabetes mellitus (HCC)     pt states he no longer has diabetes he has lost alot of davion.     FSGS (focal segmental glomerulosclerosis) 5/23/2013    Hemodialysis patient 10/17/2016    on hemodialysis with [facility]    Hemorrhoids 1/16/2012    History of blood transfusion     Hyperlipidemia     Hyperphosphatemia 5/21/2016    Hypertension     Left renal artery stenosis 5/22/2014    Monoclonal (M) protein disease, multiple 'M' protein     Nicotine dependence 6/16/2014    Noncompliance     Pneumonia     Psychiatric problem     PTSD (post-traumatic stress disorder)     Pt vet from [place]    Secondary hyperparathyroidism (of renal origin)     Sleep apnea            Electronically signed by Brigette Simon RN on 3/18/2025 at 1:50 AM

## 2025-03-18 NOTE — PROGRESS NOTES
Warfarin Pharmacy Consult Note    Trav Jennings is a 60 y.o. male for whom pharmacy has been asked to manage warfarin therapy.     Consulting Provider: Dr. Umesh Chou  Indication: Atrial fibrillation/Atrial flutter  Target INR: 2.0-3.0   Warfarin dose prior to admission: 7.5 mg daily  Outpatient warfarin provider: [facility] (history of non-compliance)    Recent Labs     03/17/25  2312 03/18/25  0635   HGB 8.3* 8.3*   * 106*     Recent Labs     03/17/25  2312   INR 1.66*     Concurrent anticoagulants/antiplatelets: aspirin  Significant warfarin drug-drug interactions: amiodarone (home med)     Date INR Warfarin Dose   3/17/25 1.66 -   3/18/25 1.55 11.25                              INR will be monitored routinely until therapeutic INR is achieved.    Pharmacy will continue to follow. Thank you for the consult,   Julius Gallagher [facility]

## 2025-03-18 NOTE — CARE COORDINATION
Case Management Assessment Initial Evaluation    Date/Time of Evaluation: 3/18/2025 7:43 AM  Assessment Completed by: Yue Chen, RN    If patient is discharged prior to next notation, then this note serves as note for discharge by case management.    Patient Name: Trav Jain                   YOB: 1965  Diagnosis: Hyperkalemia [E87.5]  Pneumonia of right lower lobe due to infectious organism [J18.9]                   Date / Time: 3/17/2025 10:48 PM  Location: [facility]     Patient Admission Status: Observation   Readmission Risk Low 0-14, Mod 15-19), High > 20: Readmission Risk Score: 37.9    Current PCP: Radha Hou, WADE - CNP  Health Care Decision Makers:   Primary Decision Maker: UZIEL JAIN - Spouse - [phone]    Additional Case Management Notes: Admitted through ED as observation with SOB that has progressively worsened. History of cocaine and alcohol use and ESRD with HD. Consulting Nephrology. BUN 86 and 51, creatinine 10.3 and 6.7. Potassium was 7.7 and now is 4.5. BNP 56189.0. Hgb 8.3. Platelets 105 and 106. Rocephin iv daily, Doxycycline iv q12hr. BP high of 213/89.     Procedures: None    Imaging:   3/17 PCXR: Right lower lung consolidation, which could be due to pneumonia.  Possible right pleural effusion.    Patient Goals/Plan/Treatment Preferences: Spoke with pt. He lives at home with his wife. He is current with [facility] for OP HD, TRS at 0650. [facility] transports him to and from dialysis. He has home O2 (he owns his concentrator) and wears his O2 prn (3-4L). Denies other needs.        03/18/25 1016   Service Assessment   Patient Orientation Alert and Oriented   Cognition Alert   History Provided By Patient   Primary Caregiver Self   Accompanied By/Relationship n/a   Support Systems Spouse/Significant Other   Patient's Healthcare Decision Maker is: Patient Declined (Legal Next of Kin Remains as Decision Maker)   PCP Verified by CM Yes   Last Visit to PCP Within last 3

## 2025-03-18 NOTE — PROGRESS NOTES
Patient returned to room after HD. 3L removed per dialysis RN verbal report . Pt. Tolerated tx session well .

## 2025-03-18 NOTE — ED TRIAGE NOTES
Pt presents to the ED via EMS from home with c/o SOB and chest pain that has been going all day, chest pain intermittent rated 6/10 described as sharp. Pt wears 4L baseline oxygen and pt states he has been wearing oxygen all day without relief. Pt SPO2 on room air 95%, pt placed on baseline 4L NC at this time with SPO2 99% at this time. EMS states they gave 324mg Aspirin and 1 Nitroglycerin en route, pt states he doesn't know if the medication EMS gave helped, he states he is having pain right now in the chest. Pt alert and oriented x4. Pt has fistula in left arm and does Dialysis Tuesday, Thursday, Saturday. Pt hypertensive other vitals stable at this time. EKG complete.

## 2025-03-18 NOTE — FLOWSHEET NOTE
Stable 2.5 hour TX completed. Removed 3 L of fluid, tolerated fluid removal well. Pressure held to each needle site x 10 min. Drsg clean, dry, and intact upon leaving unit. TX record printed for scanning into EMR. Report called to primary RN.   03/18/25 1400 03/18/25 7289   Vital Signs   BP (!) 177/88 129/70   Temp 98.8 °F (37.1 °C) 98 °F (36.7 °C)   Pulse 80 77   Respirations 19 18   SpO2 97 % 100 %   Weight - Scale 98.6 kg (217 lb 6 oz) 95.6 kg (210 lb 12.2 oz)   Weight Method Standing scale  --    Percent Weight Change -2.18 -3.04   Post-Hemodialysis Assessment   Post-Treatment Procedures  --  Blood returned;Access bleeding time < 10 minutes   Machine Disinfection Process  --  Acid/Vinegar Clean;Heat Disinfect;Exterior Machine Disinfection   Blood Volume Processed (Liters)  --  55.5 L   Dialyzer Clearance  --  Lightly streaked   Duration of Treatment (minutes)  --  150 minutes   Heparin Amount Administered During Treatment (mL)  --  0 mL   Hemodialysis Intake (ml)  --  400 ml   Hemodialysis Output (ml)  --  3400 ml   NET Removed (ml)  --  3000   Tolerated Treatment  --  Good

## 2025-03-18 NOTE — PROGRESS NOTES
Patient resting in bed eyes open. Alert to person place time situation. Speech clear. PERRL 4mm to 3mm. Skin turgor brisk. Capillary refill <2 seconds. Lung sounds clear anterior posterior lateral. Heart sounds regular. Respirations unlabored. Bowel sounds active all four quadrants denies pain upon palpation. Arm drift negative. Hand grasp strong bilaterally. Pain stated 6 out of 10 left arm. Left arm swelling present, present since before admission RN aware. Leg lift present bilaterally. Pedal push pull strong bilaterally. Pedal pulses strong bilaterally. Patient request ice pack. Denies further needs. Bed in low position, call light in reach. Ice pack applied to left arm.

## 2025-03-19 LAB
ABO GROUP BLD: NORMAL
ANION GAP SERPL CALC-SCNC: 11 MEQ/L (ref 8–16)
BUN SERPL-MCNC: 45 MG/DL (ref 8–23)
CALCIUM SERPL-MCNC: 8.1 MG/DL (ref 8.6–10)
CHLORIDE SERPL-SCNC: 102 MEQ/L (ref 98–111)
CO2 SERPL-SCNC: 25 MEQ/L (ref 22–29)
CREAT SERPL-MCNC: 6.4 MG/DL (ref 0.7–1.2)
DEPRECATED RDW RBC AUTO: 56.3 FL (ref 35–45)
ERYTHROCYTE [DISTWIDTH] IN BLOOD BY AUTOMATED COUNT: 14.6 % (ref 11.5–14.5)
ETHANOL SERPL-MCNC: < 0.01 % (ref 0–0.08)
GFR SERPL CREATININE-BSD FRML MDRD: 9 ML/MIN/1.73M2
GLUCOSE SERPL-MCNC: 86 MG/DL (ref 74–109)
HCT VFR BLD AUTO: 23.4 % (ref 42–52)
HCT VFR BLD AUTO: 25.1 % (ref 42–52)
HEPARIN UNFRACTIONATED: 0.2 U/ML (ref 0.3–0.7)
HGB BLD-MCNC: 7 GM/DL (ref 14–18)
HGB BLD-MCNC: 7.6 GM/DL (ref 14–18)
IAT IGG-SP REAG SERPL QL: NORMAL
INR PPP: 1.4 (ref 0.85–1.13)
MCH RBC QN AUTO: 31.4 PG (ref 26–33)
MCHC RBC AUTO-ENTMCNC: 29.9 GM/DL (ref 32.2–35.5)
MCV RBC AUTO: 104.9 FL (ref 80–94)
PLATELET # BLD AUTO: 105 THOU/MM3 (ref 130–400)
PMV BLD AUTO: 10.2 FL (ref 9.4–12.4)
POTASSIUM SERPL-SCNC: 5.9 MEQ/L (ref 3.5–5.2)
RBC # BLD AUTO: 2.23 MILL/MM3 (ref 4.7–6.1)
RH BLD: NORMAL
SCAN OF BLOOD SMEAR: NORMAL
SODIUM SERPL-SCNC: 138 MEQ/L (ref 135–145)
WBC # BLD AUTO: 4.9 THOU/MM3 (ref 4.8–10.8)

## 2025-03-19 PROCEDURE — 90935 HEMODIALYSIS ONE EVALUATION: CPT

## 2025-03-19 PROCEDURE — 85014 HEMATOCRIT: CPT

## 2025-03-19 PROCEDURE — 96376 TX/PRO/DX INJ SAME DRUG ADON: CPT

## 2025-03-19 PROCEDURE — 85610 PROTHROMBIN TIME: CPT

## 2025-03-19 PROCEDURE — 85018 HEMOGLOBIN: CPT

## 2025-03-19 PROCEDURE — 82077 ASSAY SPEC XCP UR&BREATH IA: CPT

## 2025-03-19 PROCEDURE — 2580000003 HC RX 258

## 2025-03-19 PROCEDURE — 6360000002 HC RX W HCPCS: Performed by: STUDENT IN AN ORGANIZED HEALTH CARE EDUCATION/TRAINING PROGRAM

## 2025-03-19 PROCEDURE — 6370000000 HC RX 637 (ALT 250 FOR IP)

## 2025-03-19 PROCEDURE — 6360000002 HC RX W HCPCS: Performed by: INTERNAL MEDICINE

## 2025-03-19 PROCEDURE — 5A1D70Z PERFORMANCE OF URINARY FILTRATION, INTERMITTENT, LESS THAN 6 HOURS PER DAY: ICD-10-PCS

## 2025-03-19 PROCEDURE — 99232 SBSQ HOSP IP/OBS MODERATE 35: CPT | Performed by: INTERNAL MEDICINE

## 2025-03-19 PROCEDURE — G0378 HOSPITAL OBSERVATION PER HR: HCPCS

## 2025-03-19 PROCEDURE — 86900 BLOOD TYPING SEROLOGIC ABO: CPT

## 2025-03-19 PROCEDURE — 2500000003 HC RX 250 WO HCPCS

## 2025-03-19 PROCEDURE — 36415 COLL VENOUS BLD VENIPUNCTURE: CPT

## 2025-03-19 PROCEDURE — 99233 SBSQ HOSP IP/OBS HIGH 50: CPT | Performed by: STUDENT IN AN ORGANIZED HEALTH CARE EDUCATION/TRAINING PROGRAM

## 2025-03-19 PROCEDURE — 85027 COMPLETE CBC AUTOMATED: CPT

## 2025-03-19 PROCEDURE — 85520 HEPARIN ASSAY: CPT

## 2025-03-19 PROCEDURE — 2700000000 HC OXYGEN THERAPY PER DAY

## 2025-03-19 PROCEDURE — 6360000002 HC RX W HCPCS

## 2025-03-19 PROCEDURE — 80048 BASIC METABOLIC PNL TOTAL CA: CPT

## 2025-03-19 PROCEDURE — 96366 THER/PROPH/DIAG IV INF ADDON: CPT

## 2025-03-19 PROCEDURE — 86901 BLOOD TYPING SEROLOGIC RH(D): CPT

## 2025-03-19 PROCEDURE — 86885 COOMBS TEST INDIRECT QUAL: CPT

## 2025-03-19 RX ADMIN — FOLIC ACID 1 MG: 1 TABLET ORAL at 13:38

## 2025-03-19 RX ADMIN — ISOSORBIDE MONONITRATE 120 MG: 60 TABLET, EXTENDED RELEASE ORAL at 22:13

## 2025-03-19 RX ADMIN — EPOETIN ALFA-EPBX 6000 UNITS: 4000 INJECTION, SOLUTION INTRAVENOUS; SUBCUTANEOUS at 17:02

## 2025-03-19 RX ADMIN — DOXYCYCLINE 100 MG: 100 INJECTION, POWDER, LYOPHILIZED, FOR SOLUTION INTRAVENOUS at 14:02

## 2025-03-19 RX ADMIN — LABETALOL HYDROCHLORIDE 100 MG: 100 TABLET, FILM COATED ORAL at 22:14

## 2025-03-19 RX ADMIN — WATER 1000 MG: 1 INJECTION INTRAMUSCULAR; INTRAVENOUS; SUBCUTANEOUS at 01:01

## 2025-03-19 RX ADMIN — HEPARIN SODIUM 2000 UNITS: 1000 INJECTION INTRAVENOUS; SUBCUTANEOUS at 06:08

## 2025-03-19 RX ADMIN — AMLODIPINE BESYLATE 5 MG: 5 TABLET ORAL at 13:37

## 2025-03-19 RX ADMIN — AMIODARONE HYDROCHLORIDE 200 MG: 200 TABLET ORAL at 13:38

## 2025-03-19 RX ADMIN — ASPIRIN 81 MG: 81 TABLET, COATED ORAL at 13:38

## 2025-03-19 RX ADMIN — HEPARIN SODIUM 12 UNITS/KG/HR: 10000 INJECTION, SOLUTION INTRAVENOUS at 00:15

## 2025-03-19 RX ADMIN — ATORVASTATIN CALCIUM 40 MG: 40 TABLET, FILM COATED ORAL at 13:38

## 2025-03-19 RX ADMIN — HEPARIN SODIUM 14 UNITS/KG/HR: 10000 INJECTION, SOLUTION INTRAVENOUS at 06:10

## 2025-03-19 RX ADMIN — HYDRALAZINE HYDROCHLORIDE 100 MG: 50 TABLET ORAL at 15:20

## 2025-03-19 RX ADMIN — HEPARIN SODIUM 2000 UNITS: 1000 INJECTION INTRAVENOUS; SUBCUTANEOUS at 00:13

## 2025-03-19 RX ADMIN — HYDRALAZINE HYDROCHLORIDE 100 MG: 50 TABLET ORAL at 06:13

## 2025-03-19 RX ADMIN — LABETALOL HYDROCHLORIDE 100 MG: 100 TABLET, FILM COATED ORAL at 06:13

## 2025-03-19 RX ADMIN — CLONIDINE HYDROCHLORIDE 0.3 MG: 0.2 TABLET ORAL at 13:38

## 2025-03-19 RX ADMIN — HYDRALAZINE HYDROCHLORIDE 100 MG: 50 TABLET ORAL at 22:14

## 2025-03-19 RX ADMIN — CLONIDINE HYDROCHLORIDE 0.3 MG: 0.2 TABLET ORAL at 22:14

## 2025-03-19 RX ADMIN — WARFARIN SODIUM 12.5 MG: 10 TABLET ORAL at 17:02

## 2025-03-19 NOTE — PROGRESS NOTES
Pt. Is alert and oriented 4x. Pupils are round, equal, and reactive to light. They constricted from 5mm to 4mm. Upper extremities are appropriate to ethnicity, warm, and dry. Movement is nonrestrictive. No numbness or tingling present. Hand grasp is strong bilaterally. Cap refill and skin turgor is less than three seconds. Heart sounds are present, bounding, and regular. Lung sounds are diminished. Bowel sounds are present in all four quadrants. No tenderness when palpated. Lower extremities are appropriate to ethnicity, warm, and dry. Movement is nonrestrictive.

## 2025-03-19 NOTE — PLAN OF CARE
Problem: Safety - Adult  Goal: Free from fall injury  3/19/2025 0923 by Lynne Nazario, RN  Outcome: Progressing  3/19/2025 0030 by William Dangelo, RN  Outcome: Progressing

## 2025-03-19 NOTE — PROGRESS NOTES
[facility]   Nephrology progress note  3/19/2025, 12:44 PM      Pt Name:    Trav Jennings  MRN:     [id number]     YOB: 1965  Admit Date:    3/17/2025 10:48 PM    Chief Complaint: Nephrology following for ESRD    Subjective:  Patient was seen and examined this morning  No chest pain or shortness of breath      Objective:  24HR INTAKE/OUTPUT:    Intake/Output Summary (Last 24 hours) at 3/19/2025 1244  Last data filed at 3/18/2025 2010  Gross per 24 hour   Intake 1198.12 ml   Output 3400 ml   Net -2201.88 ml         I/O last 3 completed shifts:  In: 2398.1 [P.O.:1300; I.V.:15; IV Piggyback:283.1]  Out: 6800   No intake/output data recorded.   Admission weight: 96.6 kg (213 lb)  Wt Readings from Last 3 Encounters:   03/19/25 97.1 kg (214 lb 1.1 oz)   03/11/25 96.9 kg (213 lb 10 oz)   03/05/25 102.1 kg (225 lb)        Vitals :   Vitals:    03/19/25 0100 03/19/25 0400 03/19/25 0837 03/19/25 0900   BP: (!) 146/68 (!) 153/61 (!) 168/70 (!) 152/69   Pulse: 75 71 71    Resp: 13  16    Temp: 98.2 °F (36.8 °C) 98 °F (36.7 °C) 98.1 °F (36.7 °C) 98.8 °F (37.1 °C)   TempSrc:   Oral    SpO2: 100% 100% 100%    Weight:    97.1 kg (214 lb 1.1 oz)   Height:           Physical examination  General Appearance: alert and cooperative with exam, appears comfortable, no distress  Mouth/Throat: Oral mucosa moist  Neck: No JVD  Lungs:  no use of accessory muscles  GI: soft, non-tender, no guarding  Extremities: improved LE edema    Medications:  Infusion:    dextrose      heparin (PORCINE) Infusion 14 Units/kg/hr (03/19/25 0610)     Meds:    warfarin  12.5 mg Oral Once    cefTRIAXone (ROCEPHIN) IV  1,000 mg IntraVENous Q24H    doxycycline (VIBRAMYCIN) IV  100 mg IntraVENous Q12H    amiodarone  200 mg Oral Daily    aspirin  81 mg Oral Daily    atorvastatin  40 mg Oral Daily    folic acid  1 mg Oral Daily    hydrALAZINE  100 mg Oral q8h    isosorbide mononitrate  120 mg Oral BID    warfarin placeholder: dosing  by pharmacy   Oral RX Placeholder    amLODIPine  5 mg Oral Daily    cloNIDine  0.3 mg Oral TID    labetalol  100 mg Oral 3 times per day     Meds prn: albuterol, glucose, dextrose bolus **OR** dextrose bolus, glucagon (rDNA), dextrose, hydrOXYzine pamoate, heparin (porcine), heparin (porcine)     Lab Data :  CBC:   Recent Labs     03/17/25  2312 03/18/25  0635 03/19/25  0435 03/19/25  0742   WBC 7.7 6.0 4.9  --    HGB 8.3* 8.3* 7.0* 7.6*   HCT 27.1* 26.7* 23.4* 25.1*   * 106* 105*  --      CMP:  Recent Labs     03/18/25  0634 03/18/25  1236 03/19/25  0435    139 138   K 4.5 5.6* 5.9*   CL 97* 99 102   CO2 29 27 25   BUN 51* 59* 45*   CREATININE 6.7* 7.7* 6.4*   GLUCOSE 91 111* 86   CALCIUM 9.2 8.4* 8.1*   MG 2.3  --   --      Hepatic:   Recent Labs     03/17/25  2312   AST 24   ALT 7*   BILITOT 0.3   ALKPHOS 93         Assessment and Plan:  ESRD on hemodialysis  Plan hemodialysis treatment today due to hyperkalemia  Hyperkalemia.  Use 2K bath  Volume overload.  Improving.  Will plan fluid removal again with dialysis today  History of chronic volume overload  Hypertension  Azotemia  Paroxysmal atrial fibrillation  Anemia in ESRD.  Add Retacrit      D/W patient and dialysis RN    David Brown MD  [facility]    This report has been created using voice recognition software. It may contain minor errors which are inherent in voice recognition technology

## 2025-03-19 NOTE — FLOWSHEET NOTE
03/19/25 1257   Vital Signs   /62   Temp 98.5 °F (36.9 °C)   Weight - Scale 93 kg (205 lb 0.4 oz)   Weight Method Standing scale   Percent Weight Change -4.22   Post-Hemodialysis Assessment   Post-Treatment Procedures Blood returned;Access bleeding time < 10 minutes   Machine Disinfection Process Acid/Vinegar Clean;Heat Disinfect;Exterior Machine Disinfection   Rinseback Volume (ml) 400 ml   Blood Volume Processed (Liters) 82.4 L   Dialyzer Clearance Lightly streaked   Duration of Treatment (minutes) 210 minutes   Heparin Amount Administered During Treatment (mL) 0 mL   Hemodialysis Intake (ml) 400 ml   Hemodialysis Output (ml) 4400 ml   NET Removed (ml) 4000     Stable 3.5hour treatment complete. 4000ml removed and tolerated well. Access held x 10min each and bruit and thrill presnt. Access dressing clean, dry and intact. Report given to chely HAQUE. Charting printed and placed in the chart and scanned into EMR.

## 2025-03-19 NOTE — PROGRESS NOTES
Warfarin Pharmacy Consult Note    **Please contact pharmacist for discharge instructions**  Warfarin Indication: Atrial flutter  Target INR: 2.0-3.0  Dose prior to admission: 7.5 mg daily    Recent Labs     03/17/25  2312 03/18/25  0635 03/19/25  0435 03/19/25  0742   HGB 8.3* 8.3* 7.0* 7.6*   * 106* 105*  --      Recent Labs     03/17/25 2312 03/18/25  0634 03/19/25  0435   INR 1.66* 1.55* 1.40*     Concurrent anticoagulants/antiplatelets: aspirin  Significant warfarin drug-drug interactions: amiodarone (home med)      Date INR Warfarin Dose   3/17/25 1.66 -   3/18/25 1.55 11.25   3/19/25  1.40  12.5 mg                                        Monitoring:                   INR will be monitored daily.    Gissel YEE.Ph., BCPS., 3/19/2025,12:15 PM

## 2025-03-19 NOTE — PLAN OF CARE
Problem: Chronic Conditions and Co-morbidities  Goal: Patient's chronic conditions and co-morbidity symptoms are monitored and maintained or improved  3/19/2025 0030 by William Dangelo RN  Outcome: Progressing  3/18/2025 1050 by Leidy Peterson RN  Outcome: Progressing  Flowsheets (Taken 3/18/2025 1050)  Care Plan - Patient's Chronic Conditions and Co-Morbidity Symptoms are Monitored and Maintained or Improved: Monitor and assess patient's chronic conditions and comorbid symptoms for stability, deterioration, or improvement  Note: Hd patient , nephro following      Problem: Discharge Planning  Goal: Discharge to home or other facility with appropriate resources  3/19/2025 0030 by William Dangelo RN  Outcome: Progressing  3/18/2025 1050 by Leidy Peterson RN  Outcome: Progressing  Flowsheets (Taken 3/18/2025 1050)  Discharge to home or other facility with appropriate resources:   Identify barriers to discharge with patient and caregiver   Arrange for needed discharge resources and transportation as appropriate  Note: Patient from home      Problem: Safety - Adult  Goal: Free from fall injury  3/19/2025 0030 by William Dangelo RN  Outcome: Progressing  3/18/2025 1050 by Leidy Peterson RN  Outcome: Progressing  Flowsheets (Taken 3/18/2025 1050)  Free From Fall Injury: Instruct family/caregiver on patient safety

## 2025-03-19 NOTE — PROGRESS NOTES
Hospitalist Progress Note    Patient:  Trav Jennings      Unit/Bed:3A-10/010-A    YOB: 1965    MRN: [id number]       Acct: [id number]     PCP: Radha Hou APRN - CNP    Date of Admission: 3/17/2025    Assessment/Plan:    Hypertensive urgency: In setting of ESRD with known left WHITNEY. Endorses compliance.  Previously will review admissions for hypertensive urgency/emergency complicated by flash pulmonary edema and fluid overload.  Continue home amlodipine 5 mg p.o. daily, clonidine 0.3 mg p.o. 3 times daily, hydralazine 100 mg p.o. 3 times daily, Imdur 120 mg p.o. twice daily and labetalol 100 mg p.o. 3 times daily with holding parameters.  ESRD on HD: Associate with secondary hyperparathyroidism and FSGS. Follows output with Dr. Ayon, nephrology.  Undergoes HD T/TH/Sat.  H limit nephrotoxic agents.  Monitor renal function with daily BMP.  Nephrology consulted.   Hyperkalemia, resolving: S/p lokelma, 2 g calcium gluconate and 10 mg albuterol with refractory hyperkalemia.  Refused IV insulin due to allergy.  Underwent emergent HD, and improved K.  Monitor potassium with daily BMP.  Suspected CAP: Noted on imaging with RLL consolidation.  Cultures obtained.  Start Rocephin doxycycline for empiric antibiotic coverage.  Will de-escalate per cultures and sensitivities.  HLD: Continue Lipitor 40 mg p.o. daily  Paroxysmal atrial fibrillation: [id number].  On amiodarone for rhythm control and warfarin for anticoagulation.  Initial EKG not A-fib.  Started on heparin gtt.  Placed on continuous telemetry.  Monitor.  History of TAA/AAA: With dissection s/p aortic stent graft (Transverse Ao arch -> distal Ashleigh) 7/2018. Continue with BP control  Chronic HFpEF, NYHA II: Likely NICM.  Last ECHO EF 60-65%, concentric hypertrophy, mild MR 12/2024. On BB, no diuretic or SLGTi. 2 g sodium restriction, on HD for volume status. Strict I&Os. Monitor daily weights.  Chronic microcytic anemia: Secondary to ACD and  BRISEIDA in setting of ESRD.  Baseline Hgb ~ 8-9. Presented Hgb 7.0. Remote history of AVF hemorrhage. S/p BC-AVF ligation with excision of aneurysmal portion followed by reconstruction of graft with vascular surgery.  No obvious bleeding noted.  Warfarin held, started heparin GTT.  Check INR and monitor H/H.  Chronic thrombocytopenia: Near baseline   REZA: Noncompliant with CPAP.  Chronic hypoxic respiratory failure: On baseline 4 L home O2.  PTSD/MDD: On clonidine. No SSRI/SNRIs. Advise outpatient PCP follow up  Polysubstance abuse: Previously endorsing EtOH and cocaine.  Denies active use.          Expected discharge date:  TBD    Disposition:    [x] Home       [] TCU       [] Rehab       [] Psych       [] SNF       [] Long Term Care Facility       [] Other-    Chief Complaint: Shortness of breath    Hospital Course:   The patient is a 60-year-old male with a PMH of HTN, HLD, TAA/AAA s/p aortic stent graft, FSGS, ESRD on HD, secondary hyperparathyroidism, ERZA, anemia, GERD, MDD, polysubstance abuse including cocaine/EtOH, tobacco abuse who presented to [facility] for complaints of shortness of breath.  The patient is chronically on 4 L baseline home O2 but is notoriously noncompliant with home medications and CPAP.  He noted a productive intermittent cough with subjective fevers and occasional chills.  Additionally has reports trouble with his left arm swelling and pain since surgery few weeks ago.  He denies any other symptoms.  Upon arrival labs were significant for significant hyperkalemia refractory to Lokelma and nebulized albuterol.  He denied insulin due to reaction and was given 2 g calcium gluconate.  Nephrology was consulted and patient underwent emergent dialysis.    Subjective (past 24 hours):   No significant events overnight. This morning the patient is doing well. Denies any active complaints. Tolerating HD. LUE swelling and BP improving. Otherwise hemodynamically stable, vitals WNL       ROS (12 point review

## 2025-03-20 LAB
ANION GAP SERPL CALC-SCNC: 14 MEQ/L (ref 8–16)
BUN SERPL-MCNC: 42 MG/DL (ref 8–23)
CALCIUM SERPL-MCNC: 8.7 MG/DL (ref 8.6–10)
CHLORIDE SERPL-SCNC: 99 MEQ/L (ref 98–111)
CO2 SERPL-SCNC: 26 MEQ/L (ref 22–29)
CREAT SERPL-MCNC: 5.6 MG/DL (ref 0.7–1.2)
EKG ATRIAL RATE: 80 BPM
EKG P AXIS: 52 DEGREES
EKG P-R INTERVAL: 218 MS
EKG Q-T INTERVAL: 436 MS
EKG QRS DURATION: 112 MS
EKG QTC CALCULATION (BAZETT): 502 MS
EKG R AXIS: -42 DEGREES
EKG T AXIS: 81 DEGREES
EKG VENTRICULAR RATE: 80 BPM
GFR SERPL CREATININE-BSD FRML MDRD: 11 ML/MIN/1.73M2
GLUCOSE SERPL-MCNC: 74 MG/DL (ref 74–109)
INR PPP: 1.42 (ref 0.85–1.13)
POTASSIUM SERPL-SCNC: 5 MEQ/L (ref 3.5–5.2)
SODIUM SERPL-SCNC: 139 MEQ/L (ref 135–145)

## 2025-03-20 PROCEDURE — 2500000003 HC RX 250 WO HCPCS

## 2025-03-20 PROCEDURE — 99233 SBSQ HOSP IP/OBS HIGH 50: CPT | Performed by: STUDENT IN AN ORGANIZED HEALTH CARE EDUCATION/TRAINING PROGRAM

## 2025-03-20 PROCEDURE — 93005 ELECTROCARDIOGRAM TRACING: CPT | Performed by: STUDENT IN AN ORGANIZED HEALTH CARE EDUCATION/TRAINING PROGRAM

## 2025-03-20 PROCEDURE — 2140000000 HC CCU INTERMEDIATE R&B

## 2025-03-20 PROCEDURE — 2580000003 HC RX 258

## 2025-03-20 PROCEDURE — 90935 HEMODIALYSIS ONE EVALUATION: CPT

## 2025-03-20 PROCEDURE — 93010 ELECTROCARDIOGRAM REPORT: CPT | Performed by: INTERNAL MEDICINE

## 2025-03-20 PROCEDURE — 96376 TX/PRO/DX INJ SAME DRUG ADON: CPT

## 2025-03-20 PROCEDURE — 99232 SBSQ HOSP IP/OBS MODERATE 35: CPT | Performed by: INTERNAL MEDICINE

## 2025-03-20 PROCEDURE — 6360000002 HC RX W HCPCS

## 2025-03-20 PROCEDURE — 6370000000 HC RX 637 (ALT 250 FOR IP): Performed by: STUDENT IN AN ORGANIZED HEALTH CARE EDUCATION/TRAINING PROGRAM

## 2025-03-20 PROCEDURE — 36415 COLL VENOUS BLD VENIPUNCTURE: CPT

## 2025-03-20 PROCEDURE — 80048 BASIC METABOLIC PNL TOTAL CA: CPT

## 2025-03-20 PROCEDURE — 96366 THER/PROPH/DIAG IV INF ADDON: CPT

## 2025-03-20 PROCEDURE — 6370000000 HC RX 637 (ALT 250 FOR IP)

## 2025-03-20 PROCEDURE — 85610 PROTHROMBIN TIME: CPT

## 2025-03-20 RX ADMIN — ASPIRIN 81 MG: 81 TABLET, COATED ORAL at 08:47

## 2025-03-20 RX ADMIN — ISOSORBIDE MONONITRATE 120 MG: 60 TABLET, EXTENDED RELEASE ORAL at 08:47

## 2025-03-20 RX ADMIN — AMLODIPINE BESYLATE 5 MG: 5 TABLET ORAL at 08:47

## 2025-03-20 RX ADMIN — ATORVASTATIN CALCIUM 40 MG: 40 TABLET, FILM COATED ORAL at 08:47

## 2025-03-20 RX ADMIN — AMIODARONE HYDROCHLORIDE 200 MG: 200 TABLET ORAL at 08:47

## 2025-03-20 RX ADMIN — FOLIC ACID 1 MG: 1 TABLET ORAL at 08:47

## 2025-03-20 RX ADMIN — APIXABAN 2.5 MG: 2.5 TABLET, FILM COATED ORAL at 21:54

## 2025-03-20 RX ADMIN — WATER 1000 MG: 1 INJECTION INTRAMUSCULAR; INTRAVENOUS; SUBCUTANEOUS at 03:43

## 2025-03-20 RX ADMIN — ISOSORBIDE MONONITRATE 120 MG: 60 TABLET, EXTENDED RELEASE ORAL at 21:52

## 2025-03-20 RX ADMIN — DOXYCYCLINE 100 MG: 100 INJECTION, POWDER, LYOPHILIZED, FOR SOLUTION INTRAVENOUS at 17:35

## 2025-03-20 RX ADMIN — DOXYCYCLINE 100 MG: 100 INJECTION, POWDER, LYOPHILIZED, FOR SOLUTION INTRAVENOUS at 02:46

## 2025-03-20 RX ADMIN — HYDRALAZINE HYDROCHLORIDE 100 MG: 50 TABLET ORAL at 21:52

## 2025-03-20 RX ADMIN — CLONIDINE HYDROCHLORIDE 0.3 MG: 0.2 TABLET ORAL at 08:47

## 2025-03-20 RX ADMIN — LABETALOL HYDROCHLORIDE 100 MG: 100 TABLET, FILM COATED ORAL at 21:52

## 2025-03-20 ASSESSMENT — PAIN SCALES - GENERAL: PAINLEVEL_OUTOF10: 0

## 2025-03-20 NOTE — FLOWSHEET NOTE
03/20/25 1630   Vital Signs   BP (!) 158/73   Temp 97.7 °F (36.5 °C)   Pulse 73   Respirations 19   Weight - Scale 93.1 kg (205 lb 4 oz)   Weight Method Standing scale   Percent Weight Change -1.9   Post-Hemodialysis Assessment   Post-Treatment Procedures Blood returned;Access bleeding time < 10 minutes   Machine Disinfection Process Acid/Vinegar Clean;Heat Disinfect;Exterior Machine Disinfection   Rinseback Volume (ml) 400 ml   Blood Volume Processed (Liters) 83.7 L   Dialyzer Clearance Lightly streaked   Duration of Treatment (minutes) 240 minutes   Heparin Amount Administered During Treatment (mL) 0 mL   Hemodialysis Intake (ml) 300 ml   Hemodialysis Output (ml) 2164 ml   NET Removed (ml) 1864     stable 4 hour treatment. some hypotension noted, uf goal decreased to 2 liter net uf. pressure held both cannulation sites x 10 minutes, dressing dry and intact upon discharge from unit.

## 2025-03-20 NOTE — PLAN OF CARE
Problem: Chronic Conditions and Co-morbidities  Goal: Patient's chronic conditions and co-morbidity symptoms are monitored and maintained or improved  Outcome: Progressing     Problem: Discharge Planning  Goal: Discharge to home or other facility with appropriate resources  Outcome: Progressing     Problem: Safety - Adult  Goal: Free from fall injury  3/19/2025 2156 by William Dangelo, RN  Outcome: Progressing  3/19/2025 0923 by Lynne Nazario, RN  Outcome: Progressing

## 2025-03-20 NOTE — PROGRESS NOTES
Hospitalist Progress Note    Patient:  Trav Jennings      Unit/Bed:3A-10/010-A    YOB: 1965    MRN: [id number]       Acct: [id number]     PCP: Radha Hou APRN - CNP    Date of Admission: 3/17/2025    Assessment/Plan:    Hypertensive urgency, resolved: In setting of ESRD with known left WHITNEY. Endorses compliance.  Previously will review admissions for hypertensive urgency/emergency complicated by flash pulmonary edema and fluid overload.  Continue home amlodipine 5 mg p.o. daily, clonidine 0.3 mg p.o. 3 times daily, hydralazine 100 mg p.o. 3 times daily, Imdur 120 mg p.o. twice daily and labetalol 100 mg p.o. 3 times daily with holding parameters.  ESRD on HD: Associate with secondary hyperparathyroidism and FSGS. Follows output with Dr. Ayon, nephrology.  Undergoes HD T/TH/Sat.  H limit nephrotoxic agents.  Monitor renal function with daily BMP.  Nephrology consulted.   Hyperkalemia, resolving: S/p lokelma, 2 g calcium gluconate and 10 mg albuterol with refractory hyperkalemia.  Refused IV insulin due to allergy.  Underwent emergent HD, and improved K.  Monitor potassium with daily BMP.  Suspected CAP: Noted on imaging with RLL consolidation.  Cultures obtained.  Start Rocephin doxycycline for empiric antibiotic coverage.  Will de-escalate per cultures and sensitivities.  HLD: Continue Lipitor 40 mg p.o. daily  Paroxysmal atrial fibrillation: [id number] 4.  On amiodarone for rhythm control and warfarin for anticoagulation.  Initial EKG not A-fib.  Placed on continuous telemetry.  Issues with noncompliance with warfarin, transitioned to eliquis.   History of TAA/AAA: With dissection s/p aortic stent graft (Transverse Ao arch -> distal Ashleigh) 7/2018. Continue with BP control  Chronic HFpEF, NYHA II: Likely NICM.  Last ECHO EF 60-65%, concentric hypertrophy, mild MR 12/2024. On BB, no diuretic or SLGTi. 2 g sodium restriction, on HD for volume status. Strict I&Os. Monitor daily  EP: Dr. Penaloza  Cardio: Dr. Foster rashes or lesions.  Neurologic:  Neurovascularly intact without any focal sensory/motor deficits. Cranial nerves: II-XII intact, grossly non-focal.  Psychiatric: Alert and oriented, thought content appropriate, normal insight  Capillary Refill: Brisk,< 3 seconds   Peripheral Pulses: +2 palpable, equal bilaterally       Labs:   Recent Labs     03/17/25  2312 03/18/25  0635 03/19/25  0435 03/19/25  0742   WBC 7.7 6.0 4.9  --    HGB 8.3* 8.3* 7.0* 7.6*   HCT 27.1* 26.7* 23.4* 25.1*   * 106* 105*  --      Recent Labs     03/18/25  1236 03/19/25  0435 03/20/25  0520    138 139   K 5.6* 5.9* 5.0   CL 99 102 99   CO2 27 25 26   BUN 59* 45* 42*   CREATININE 7.7* 6.4* 5.6*   CALCIUM 8.4* 8.1* 8.7     Recent Labs     03/17/25  2312   AST 24   ALT 7*   BILITOT 0.3   ALKPHOS 93     Recent Labs     03/18/25  0634 03/19/25  0435 03/20/25  0520   INR 1.55* 1.40* 1.42*     No results for input(s): \"CKTOTAL\", \"TROPONINI\" in the last 72 hours.    Microbiology:      Urinalysis:      Lab Results   Component Value Date/Time    NITRU NEGATIVE 04/03/2018 07:45 PM    WBCUA 2-4 04/03/2018 07:45 PM    BACTERIA NONE 04/03/2018 07:45 PM    RBCUA 5-10 04/03/2018 07:45 PM    BLOODU SMALL 04/03/2018 07:45 PM    GLUCOSEU NEGATIVE 04/03/2018 07:45 PM       Radiology:  XR CHEST PORTABLE   Final Result   Right lower lung consolidation, which could be due to pneumonia.   Possible right pleural effusion.   Nonemergent/incidental findings above.      This document has been electronically signed by: Jovi Matute MD on    03/17/2025 11:49 PM          DVT prophylaxis: [] Lovenox                                 [] SCDs                                 [] SQ Heparin                                 [] Encourage ambulation           [x] Already on Anticoagulation - Warfarin     Code Status: Full Code    PT/OT Eval Status: N/A    Tele:   [x] yes             [] no    Electronically signed by Zachery Begum DO on 3/20/2025 at 8:17 AM

## 2025-03-20 NOTE — PROCEDURES
PROCEDURE NOTE  Date: 3/20/2025   Name: Trav Jennings  YOB: 1965    Procedures  12 lead EKG completed. Results handed to Yue Salgado CET

## 2025-03-20 NOTE — PROGRESS NOTES
[facility]   Nephrology progress note  3/20/2025, 11:40 AM      Pt Name:    Trav Jennings  MRN:     [id number]     YOB: 1965  Admit Date:    3/17/2025 10:48 PM    Chief Complaint: Nephrology following for ESRD    Subjective:  Patient was seen and examined this morning  No chest pain or shortness of breath      Objective:  24HR INTAKE/OUTPUT:    Intake/Output Summary (Last 24 hours) at 3/20/2025 1140  Last data filed at 3/19/2025 1427  Gross per 24 hour   Intake 700 ml   Output 4400 ml   Net -3700 ml         I/O last 3 completed shifts:  In: 1198.1 [P.O.:600; I.V.:15; IV Piggyback:183.1]  Out: 4400   No intake/output data recorded.   Admission weight: 96.6 kg (213 lb)  Wt Readings from Last 3 Encounters:   03/19/25 93 kg (205 lb 0.4 oz)   03/11/25 96.9 kg (213 lb 10 oz)   03/05/25 102.1 kg (225 lb)        Vitals :   Vitals:    03/20/25 0005 03/20/25 0319 03/20/25 0427 03/20/25 0842   BP: (!) 92/35  (!) 115/36 (!) 175/67   Pulse: 68  68 (!) 142   Resp: 17  12 13   Temp: 97.7 °F (36.5 °C) 97.8 °F (36.6 °C)  97.7 °F (36.5 °C)   TempSrc: Oral Oral  Oral   SpO2: 100%   100%   Weight:       Height:           Physical examination  General Appearance: alert and cooperative with exam, appears comfortable, no distress  Neck: No JVD  Lungs:  no use of accessory muscles  GI: soft, non-tender, no guarding  Extremities: improved LE edema    Medications:  Infusion:    dextrose       Meds:    apixaban  2.5 mg Oral BID    epoetin qiana-epbx  6,000 Units SubCUTAneous Once per day on Monday Wednesday Friday    cefTRIAXone (ROCEPHIN) IV  1,000 mg IntraVENous Q24H    doxycycline (VIBRAMYCIN) IV  100 mg IntraVENous Q12H    amiodarone  200 mg Oral Daily    aspirin  81 mg Oral Daily    atorvastatin  40 mg Oral Daily    folic acid  1 mg Oral Daily    hydrALAZINE  100 mg Oral q8h    isosorbide mononitrate  120 mg Oral BID    amLODIPine  5 mg Oral Daily    cloNIDine  0.3 mg Oral TID    labetalol  100 mg Oral 3

## 2025-03-20 NOTE — PROGRESS NOTES
Spiritual Health History and Assessment/Progress Note  [facility]    (P) Spiritual/Emotional Needs,  ,  ,      Name: Trav Jennings MRN: [id number]    Age: 60 y.o.     Sex: male   Language: English   Tenriism: Buddhism   Hyperkalemia     Date: 3/20/2025            Total Time Calculated: (P) 18 min              Spiritual Assessment continued in [facility] CCU 3A        Referral/Consult From: (P) Rounding   Encounter Overview/Reason: (P) Spiritual/Emotional Needs  Service Provided For: (P) Patient and family together    Elizabeth, Belief, Meaning:   Patient identifies as spiritual, is connected with a elizabeth tradition or spiritual practice, and has beliefs or practices that help with coping during difficult times  Family/Friends identify as spiritual, are connected with a elizabeth tradition or spiritual practice, and have beliefs or practices that help with coping during difficult times      Importance and Influence:  Patient has spiritual/personal beliefs that influence decisions regarding their health  Family/Friends have spiritual/personal beliefs that influence decisions regarding the patient's health    Community:  Patient feels well-supported. Support system includes: Spouse/Partner, Friends, and Extended family  Family/Friends feel well-supported. Support system includes: Spouse/Partner, Friends, and Extended family    Assessment and Plan of Care:     Patient Interventions include: Facilitated expression of thoughts and feelings, Explored spiritual coping/struggle/distress, Engaged in theological reflection, Affirmed coping skills/support systems, and Provided sacramental/Jain ritual  Family/Friends Interventions include: Facilitated expression of thoughts and feelings, Explored spiritual coping/struggle/distress, Engaged in theological reflection, Affirmed coping skills/support systems, and Provided sacramental/Jain ritual    Patient Plan of Care: Spiritual Care available upon further

## 2025-03-20 NOTE — PLAN OF CARE
Problem: Chronic Conditions and Co-morbidities  Goal: Patient's chronic conditions and co-morbidity symptoms are monitored and maintained or improved  3/20/2025 1100 by Yue Estrada RN  Outcome: Progressing     Problem: Discharge Planning  Goal: Discharge to home or other facility with appropriate resources  3/20/2025 1100 by Yue Estrada, RN  Outcome: Progressing     Problem: Safety - Adult  Goal: Free from fall injury  3/20/2025 1100 by Yue Estrada, RN  Outcome: Progressing

## 2025-03-21 VITALS
BODY MASS INDEX: 30.04 KG/M2 | HEIGHT: 75 IN | OXYGEN SATURATION: 100 % | WEIGHT: 241.62 LBS | RESPIRATION RATE: 26 BRPM | SYSTOLIC BLOOD PRESSURE: 147 MMHG | TEMPERATURE: 98.6 F | DIASTOLIC BLOOD PRESSURE: 58 MMHG | HEART RATE: 78 BPM

## 2025-03-21 LAB
ANION GAP SERPL CALC-SCNC: 12 MEQ/L (ref 8–16)
BUN SERPL-MCNC: 33 MG/DL (ref 8–23)
CALCIUM SERPL-MCNC: 8.7 MG/DL (ref 8.6–10)
CHLORIDE SERPL-SCNC: 99 MEQ/L (ref 98–111)
CO2 SERPL-SCNC: 27 MEQ/L (ref 22–29)
CREAT SERPL-MCNC: 4.7 MG/DL (ref 0.7–1.2)
GFR SERPL CREATININE-BSD FRML MDRD: 14 ML/MIN/1.73M2
GLUCOSE SERPL-MCNC: 99 MG/DL (ref 74–109)
POTASSIUM SERPL-SCNC: 4.8 MEQ/L (ref 3.5–5.2)
SODIUM SERPL-SCNC: 138 MEQ/L (ref 135–145)

## 2025-03-21 PROCEDURE — 2580000003 HC RX 258

## 2025-03-21 PROCEDURE — 36415 COLL VENOUS BLD VENIPUNCTURE: CPT

## 2025-03-21 PROCEDURE — 6370000000 HC RX 637 (ALT 250 FOR IP)

## 2025-03-21 PROCEDURE — 2500000003 HC RX 250 WO HCPCS

## 2025-03-21 PROCEDURE — 6360000002 HC RX W HCPCS

## 2025-03-21 PROCEDURE — 6370000000 HC RX 637 (ALT 250 FOR IP): Performed by: STUDENT IN AN ORGANIZED HEALTH CARE EDUCATION/TRAINING PROGRAM

## 2025-03-21 PROCEDURE — 99239 HOSP IP/OBS DSCHRG MGMT >30: CPT | Performed by: STUDENT IN AN ORGANIZED HEALTH CARE EDUCATION/TRAINING PROGRAM

## 2025-03-21 PROCEDURE — 99232 SBSQ HOSP IP/OBS MODERATE 35: CPT | Performed by: INTERNAL MEDICINE

## 2025-03-21 PROCEDURE — 80048 BASIC METABOLIC PNL TOTAL CA: CPT

## 2025-03-21 RX ORDER — DOXYCYCLINE HYCLATE 100 MG
100 TABLET ORAL 2 TIMES DAILY
Qty: 2 TABLET | Refills: 0 | Status: SHIPPED | OUTPATIENT
Start: 2025-03-21 | End: 2025-03-22

## 2025-03-21 RX ADMIN — ASPIRIN 81 MG: 81 TABLET, COATED ORAL at 08:19

## 2025-03-21 RX ADMIN — DOXYCYCLINE 100 MG: 100 INJECTION, POWDER, LYOPHILIZED, FOR SOLUTION INTRAVENOUS at 06:09

## 2025-03-21 RX ADMIN — HYDRALAZINE HYDROCHLORIDE 100 MG: 50 TABLET ORAL at 06:05

## 2025-03-21 RX ADMIN — ISOSORBIDE MONONITRATE 120 MG: 60 TABLET, EXTENDED RELEASE ORAL at 08:17

## 2025-03-21 RX ADMIN — ATORVASTATIN CALCIUM 40 MG: 40 TABLET, FILM COATED ORAL at 08:15

## 2025-03-21 RX ADMIN — FOLIC ACID 1 MG: 1 TABLET ORAL at 08:15

## 2025-03-21 RX ADMIN — HYDROXYZINE PAMOATE 50 MG: 50 CAPSULE ORAL at 08:15

## 2025-03-21 RX ADMIN — LABETALOL HYDROCHLORIDE 100 MG: 100 TABLET, FILM COATED ORAL at 06:05

## 2025-03-21 RX ADMIN — AMIODARONE HYDROCHLORIDE 200 MG: 200 TABLET ORAL at 08:15

## 2025-03-21 RX ADMIN — APIXABAN 2.5 MG: 2.5 TABLET, FILM COATED ORAL at 08:16

## 2025-03-21 RX ADMIN — WATER 1000 MG: 1 INJECTION INTRAMUSCULAR; INTRAVENOUS; SUBCUTANEOUS at 06:06

## 2025-03-21 NOTE — PROGRESS NOTES
Spiritual Health History and Assessment/Progress Note  [facility]    (P) Spiritual/Emotional Needs, Follow-up,  ,  ,      Name: Trav Jennings MRN: [id number]    Age: 60 y.o.     Sex: male   Language: English   Presybeterian: Quaker   Hyperkalemia     Date: 3/21/2025            Total Time Calculated: (P) 10 min              Spiritual Assessment continued in [facility] CCU 3A        Referral/Consult From: (P) Rounding   Encounter Overview/Reason: (P) Spiritual/Emotional Needs, Follow-up  Service Provided For: (P) Patient    Elizabeth, Belief, Meaning:   Patient identifies as spiritual  Family/Friends identify as spiritual      Importance and Influence:  Patient has spiritual/personal beliefs that influence decisions regarding their health  Family/Friends have spiritual/personal beliefs that influence decisions regarding the patient's health    Community:  Patient feels well-supported. Support system includes: Spouse/Partner  Family/Friends feel well-supported. Support system includes: Spouse/Partner    Assessment and Plan of Care:   In my encounter with the 60 yr old patient, while rounding the unit 3A,  I provided spiritual care to patient through conversation, I also came to assess the patient's spiritual needs present. The pt shared moments in his  experiences. He said that he might be discharged today. The pt was admitted due to hyperkalemia.     Patient Interventions include: Facilitated expression of thoughts and feelings  Family/Friends Interventions include: Facilitated expression of thoughts and feelings    Patient Plan of Care: Spiritual Care available upon further referral  Family/Friends Plan of Care: Spiritual Care available upon further referral    Electronically signed by FLAVIA Arambula on 3/21/2025 at 3:31 PM

## 2025-03-21 NOTE — CARE COORDINATION
3/21/25, 11:37 AM EDT    Patient goals/plan/ treatment preferences discussed by  and .  Patient goals/plan/ treatment preferences reviewed with patient/ family.  Patient/ family verbalize understanding of discharge plan and are in agreement with goal/plan/treatment preferences.  Understanding was demonstrated using the teach back method.  AVS provided by RN at time of discharge, which includes all necessary medical information pertaining to the patients current course of illness, treatment, post-discharge goals of care, and treatment preferences.     Services At/After Discharge: Outpatient       Plans home with his wife. He is current with [facility] for OP HD, TRS at 0650. [facility] transports him to and from dialysis. He has home O2 (he owns his concentrator) and wears his O2 prn (3-4L). Denies other needs.     Electronically signed by Yue Chen RN on 3/21/2025 at 11:37 AM

## 2025-03-21 NOTE — PROGRESS NOTES
[facility]   Nephrology progress note  3/21/2025, 8:15 AM      Pt Name:    Trav Jennings  MRN:     [id number]     YOB: 1965  Admit Date:    3/17/2025 10:48 PM    Chief Complaint: Nephrology following for ESRD    Subjective:  Patient was seen and examined this morning  No chest pain or shortness of breath      Objective:  24HR INTAKE/OUTPUT:    Intake/Output Summary (Last 24 hours) at 3/21/2025 0815  Last data filed at 3/20/2025 1630  Gross per 24 hour   Intake 300 ml   Output 2164 ml   Net -1864 ml         I/O last 3 completed shifts:  In: 300   Out: 2164   No intake/output data recorded.   Admission weight: 96.6 kg (213 lb)  Wt Readings from Last 3 Encounters:   03/21/25 109.6 kg (241 lb 10 oz)   03/11/25 96.9 kg (213 lb 10 oz)   03/05/25 102.1 kg (225 lb)        Vitals :   Vitals:    03/21/25 0642 03/21/25 0645 03/21/25 0700 03/21/25 0715   BP:    (!) 147/58   Pulse:    78   Resp:    26   Temp:    98.6 °F (37 °C)   TempSrc:    Oral   SpO2:  100% 100% 100%   Weight: 109.6 kg (241 lb 10 oz)      Height:           Physical examination  General Appearance: alert and cooperative with exam, appears comfortable, no distress  Neck: No JVD  Lungs:  no use of accessory muscles  GI: soft, non-tender, no guarding  Extremities: improved LE edema    Medications:  Infusion:    dextrose       Meds:    apixaban  2.5 mg Oral BID    epoetin qiana-epbx  6,000 Units SubCUTAneous Once per day on Monday Wednesday Friday    cefTRIAXone (ROCEPHIN) IV  1,000 mg IntraVENous Q24H    doxycycline (VIBRAMYCIN) IV  100 mg IntraVENous Q12H    amiodarone  200 mg Oral Daily    aspirin  81 mg Oral Daily    atorvastatin  40 mg Oral Daily    folic acid  1 mg Oral Daily    hydrALAZINE  100 mg Oral q8h    isosorbide mononitrate  120 mg Oral BID    amLODIPine  5 mg Oral Daily    cloNIDine  0.3 mg Oral TID    labetalol  100 mg Oral 3 times per day     Meds prn: albuterol, glucose, dextrose bolus **OR** dextrose bolus,

## 2025-03-21 NOTE — DISCHARGE SUMMARY
Diagnostic Studies    Radiology:   XR CHEST PORTABLE   Final Result   Right lower lung consolidation, which could be due to pneumonia.   Possible right pleural effusion.   Nonemergent/incidental findings above.      This document has been electronically signed by: Jovi Matute MD on    03/17/2025 11:49 PM             Consults:     IP CONSULT TO PHARMACY  IP CONSULT TO NEPHROLOGY    Disposition: Home  Condition at Discharge: Stable    Code Status:  Full Code     Patient Instructions:    Discharge lab work: BMP in one week  Activity: activity as tolerated  Diet: ADULT DIET; Regular; Low Potassium (Less than 3000 mg/day); 2000 ml      Follow-up visits:   No follow-up provider specified.       Discharge Medications:        Medication List        START taking these medications      amoxicillin-clavulanate 875-125 MG per tablet  Commonly known as: AUGMENTIN  Take 1 tablet by mouth 2 times daily for 2 days     apixaban 2.5 MG Tabs tablet  Commonly known as: ELIQUIS  Take 1 tablet by mouth 2 times daily     doxycycline hyclate 100 MG tablet  Commonly known as: VIBRA-TABS  Take 1 tablet by mouth 2 times daily for 1 day            CONTINUE taking these medications      amiodarone 200 MG tablet  Commonly known as: CORDARONE  Take 1 tablet by mouth daily     amLODIPine 5 MG tablet  Commonly known as: NORVASC  Take 1 tablet by mouth daily     aspirin 81 MG EC tablet  Take 1 tablet by mouth daily     atorvastatin 80 MG tablet  Commonly known as: LIPITOR     cloNIDine 0.3 MG tablet  Commonly known as: CATAPRES  Take 1 tablet by mouth 3 times daily     folic acid 1 MG tablet  Commonly known as: FOLVITE  Take 1 tablet by mouth daily     hydrALAZINE 100 MG tablet  Commonly known as: APRESOLINE  Take 1 tablet by mouth every 8 (eight) hours     hydrOXYzine pamoate 50 MG capsule  Commonly known as: VISTARIL     isosorbide mononitrate 120 MG extended release tablet  Commonly known as: IMDUR  Take 1 tablet by mouth in the morning and at  bedtime     labetalol 100 MG tablet  Commonly known as: NORMODYNE  Take 1 tablet by mouth every 8 hours            STOP taking these medications      warfarin 7.5 MG tablet  Commonly known as: COUMADIN               Where to Get Your Medications        These medications were sent to [facility] - [place], [place] - [address] - P [phone] - F [phone]  [address], [place] [address]      Phone: [phone]   amoxicillin-clavulanate 875-125 MG per tablet  apixaban 2.5 MG Tabs tablet  doxycycline hyclate 100 MG tablet          Time Spent on discharge is more than 30 minutes in the examination, evaluation, counseling and review of medications and discharge plan.          Signed:    Thank you Radha Hou APRN - MEL for the opportunity to be involved in this patient's care.    Electronically signed by Zachery Begum DO on 3/21/2025 at 10:07 AM

## 2025-03-21 NOTE — PROGRESS NOTES
Patient discharge at this time. Rn went over discharge instructions and appointment with patient who verbalised understanding

## 2025-03-21 NOTE — PLAN OF CARE
Problem: Chronic Conditions and Co-morbidities  Goal: Patient's chronic conditions and co-morbidity symptoms are monitored and maintained or improved  3/20/2025 2232 by William Dangelo RN  Outcome: Progressing  3/20/2025 1100 by Yue Estrada RN  Outcome: Progressing     Problem: Discharge Planning  Goal: Discharge to home or other facility with appropriate resources  3/20/2025 2232 by William Dangelo RN  Outcome: Progressing  3/20/2025 1100 by Yue Estrada RN  Outcome: Progressing     Problem: Safety - Adult  Goal: Free from fall injury  3/20/2025 2232 by William Dangelo RN  Outcome: Progressing  3/20/2025 1100 by Yue Estrada RN  Outcome: Progressing

## 2025-03-24 PROBLEM — Z79.01 ANTICOAGULATED ON COUMADIN: Status: RESOLVED | Noted: 2025-01-17 | Resolved: 2025-03-24

## 2025-03-24 PROBLEM — Z51.81 ENCOUNTER FOR THERAPEUTIC DRUG MONITORING: Status: RESOLVED | Noted: 2025-01-17 | Resolved: 2025-03-24

## 2025-03-24 NOTE — PROGRESS NOTES
Physician Progress Note      PATIENT:               HERON JAIN  [facility] #:                  [id number]  :                       1965  ADMIT DATE:       3/17/2025 10:48 PM  DISCH DATE:        3/21/2025 12:09 PM  RESPONDING  PROVIDER #:        Luis Fernando Go DO          QUERY TEXT:    Patient admitted with hyperkalemia, noted to have paroxysmal atrial   fibrillation and is maintained on warfin, transitioned to Eliquis. If   possible, please document in progress notes and discharge summary if you are   evaluating and/or treating any of the following:?  ?  The medical record reflects the following:  Risk Factors: A Fib  Clinical Indicators: 3/20 IM \"Paroxysmal atrial fibrillation: [id number] 4.    On amiodarone for rhythm control and warfarin for anticoagulation.  Initial   EKG not A-fib.  Placed on continuous telemetry.  Issues with noncompliance   with warfarin, transitioned to eliquis.\"  Treatment: telemetry, labs, Eliquis, Coumadin  Options provided:  -- Secondary hypercoagulable state in a patient with atrial fibrillation  -- Other - I will add my own diagnosis  -- Disagree - Not applicable / Not valid  -- Disagree - Clinically unable to determine / Unknown  -- Refer to Clinical Documentation Reviewer    PROVIDER RESPONSE TEXT:    This patient has secondary hypercoagulable state in a patient with atrial   fibrillation.    Query created by: Ailyn Gillis on 3/20/2025 3:41 PM      QUERY TEXT:    Patient admitted with hyperkalemia, noted to have elevated troponin. If   possible, please document in progress notes and discharge summary if you are   evaluating and/or treating any of the following:    The medical record reflects the following:  Risk Factors: HTN, ESRD HFpEF  Clinical Indicators: H&P states \"Elevated troponin: 2/2 demand from HTN. Trop   at 70, below baseline. EKG does show lateral ST depressions that are similar   to previous. Repeat trop. No current CP. SOB improved.\"  Treatment: labs,

## 2025-04-04 ENCOUNTER — HOSPITAL ENCOUNTER (INPATIENT)
Age: 60
LOS: 2 days | Discharge: HOME | DRG: 640 | End: 2025-04-06
Attending: EMERGENCY MEDICINE | Admitting: STUDENT IN AN ORGANIZED HEALTH CARE EDUCATION/TRAINING PROGRAM
Payer: MEDICARE

## 2025-04-04 ENCOUNTER — APPOINTMENT (OUTPATIENT)
Dept: GENERAL RADIOLOGY | Age: 60
DRG: 640 | End: 2025-04-04
Payer: MEDICARE

## 2025-04-04 DIAGNOSIS — I10 UNCONTROLLED HYPERTENSION: Primary | ICD-10-CM

## 2025-04-04 PROBLEM — R53.1 GENERALIZED WEAKNESS: Status: ACTIVE | Noted: 2025-04-04

## 2025-04-04 LAB
ALBUMIN SERPL BCG-MCNC: 4.1 G/DL (ref 3.4–4.9)
ALP SERPL-CCNC: 105 U/L (ref 40–129)
ALT SERPL W/O P-5'-P-CCNC: 8 U/L (ref 10–50)
ANION GAP SERPL CALC-SCNC: 22 MEQ/L (ref 8–16)
AST SERPL-CCNC: 22 U/L (ref 10–50)
BASOPHILS ABSOLUTE: 0 THOU/MM3 (ref 0–0.1)
BASOPHILS NFR BLD AUTO: 0.6 %
BILIRUB CONJ SERPL-MCNC: < 0.1 MG/DL (ref 0–0.2)
BILIRUB SERPL-MCNC: 0.4 MG/DL (ref 0.3–1.2)
BUN SERPL-MCNC: 132 MG/DL (ref 8–23)
CALCIUM SERPL-MCNC: 9.4 MG/DL (ref 8.6–10)
CHLORIDE SERPL-SCNC: 98 MEQ/L (ref 98–111)
CO2 SERPL-SCNC: 17 MEQ/L (ref 22–29)
CREAT SERPL-MCNC: 17.4 MG/DL (ref 0.7–1.2)
DEPRECATED RDW RBC AUTO: 57.9 FL (ref 35–45)
EKG ATRIAL RATE: 84 BPM
EKG P AXIS: 64 DEGREES
EKG P-R INTERVAL: 240 MS
EKG Q-T INTERVAL: 446 MS
EKG QRS DURATION: 126 MS
EKG QTC CALCULATION (BAZETT): 527 MS
EKG R AXIS: -40 DEGREES
EKG T AXIS: 89 DEGREES
EKG VENTRICULAR RATE: 84 BPM
EOSINOPHIL NFR BLD AUTO: 0.9 %
EOSINOPHILS ABSOLUTE: 0.1 THOU/MM3 (ref 0–0.4)
ERYTHROCYTE [DISTWIDTH] IN BLOOD BY AUTOMATED COUNT: 15.3 % (ref 11.5–14.5)
ETHANOL SERPL-MCNC: < 0.01 % (ref 0–0.08)
GFR SERPL CREATININE-BSD FRML MDRD: 3 ML/MIN/1.73M2
GLUCOSE SERPL-MCNC: 103 MG/DL (ref 74–109)
HCT VFR BLD AUTO: 26.5 % (ref 42–52)
HGB BLD-MCNC: 8.4 GM/DL (ref 14–18)
IMM GRANULOCYTES # BLD AUTO: 0.02 THOU/MM3 (ref 0–0.07)
IMM GRANULOCYTES NFR BLD AUTO: 0.3 %
LYMPHOCYTES ABSOLUTE: 0.9 THOU/MM3 (ref 1–4.8)
LYMPHOCYTES NFR BLD AUTO: 12 %
MAGNESIUM SERPL-MCNC: 2.9 MG/DL (ref 1.6–2.6)
MCH RBC QN AUTO: 32.4 PG (ref 26–33)
MCHC RBC AUTO-ENTMCNC: 31.7 GM/DL (ref 32.2–35.5)
MCV RBC AUTO: 102.3 FL (ref 80–94)
MONOCYTES ABSOLUTE: 0.5 THOU/MM3 (ref 0.4–1.3)
MONOCYTES NFR BLD AUTO: 6.4 %
NEUTROPHILS ABSOLUTE: 6.2 THOU/MM3 (ref 1.8–7.7)
NEUTROPHILS NFR BLD AUTO: 79.8 %
NRBC BLD AUTO-RTO: 0 /100 WBC
NT-PROBNP SERPL IA-MCNC: ABNORMAL PG/ML (ref 0–124)
OSMOLALITY SERPL CALC.SUM OF ELEC: 316.7 MOSMOL/KG (ref 275–300)
PLATELET # BLD AUTO: 141 THOU/MM3 (ref 130–400)
PMV BLD AUTO: 10.2 FL (ref 9.4–12.4)
POTASSIUM SERPL-SCNC: 6.2 MEQ/L (ref 3.5–5.2)
PROT SERPL-MCNC: 7.4 G/DL (ref 6.4–8.3)
RBC # BLD AUTO: 2.59 MILL/MM3 (ref 4.7–6.1)
SODIUM SERPL-SCNC: 137 MEQ/L (ref 135–145)
TROPONIN, HIGH SENSITIVITY: 121 NG/L (ref 0–12)
WBC # BLD AUTO: 7.8 THOU/MM3 (ref 4.8–10.8)

## 2025-04-04 PROCEDURE — 84484 ASSAY OF TROPONIN QUANT: CPT

## 2025-04-04 PROCEDURE — 96366 THER/PROPH/DIAG IV INF ADDON: CPT

## 2025-04-04 PROCEDURE — 99222 1ST HOSP IP/OBS MODERATE 55: CPT | Performed by: INTERNAL MEDICINE

## 2025-04-04 PROCEDURE — 80053 COMPREHEN METABOLIC PANEL: CPT

## 2025-04-04 PROCEDURE — 82077 ASSAY SPEC XCP UR&BREATH IA: CPT

## 2025-04-04 PROCEDURE — 90935 HEMODIALYSIS ONE EVALUATION: CPT

## 2025-04-04 PROCEDURE — 71045 X-RAY EXAM CHEST 1 VIEW: CPT

## 2025-04-04 PROCEDURE — 6370000000 HC RX 637 (ALT 250 FOR IP): Performed by: STUDENT IN AN ORGANIZED HEALTH CARE EDUCATION/TRAINING PROGRAM

## 2025-04-04 PROCEDURE — 99223 1ST HOSP IP/OBS HIGH 75: CPT | Performed by: STUDENT IN AN ORGANIZED HEALTH CARE EDUCATION/TRAINING PROGRAM

## 2025-04-04 PROCEDURE — 96365 THER/PROPH/DIAG IV INF INIT: CPT

## 2025-04-04 PROCEDURE — 6360000002 HC RX W HCPCS

## 2025-04-04 PROCEDURE — 93005 ELECTROCARDIOGRAM TRACING: CPT

## 2025-04-04 PROCEDURE — 36415 COLL VENOUS BLD VENIPUNCTURE: CPT

## 2025-04-04 PROCEDURE — 2580000003 HC RX 258

## 2025-04-04 PROCEDURE — 96374 THER/PROPH/DIAG INJ IV PUSH: CPT

## 2025-04-04 PROCEDURE — 85025 COMPLETE CBC W/AUTO DIFF WBC: CPT

## 2025-04-04 PROCEDURE — G0378 HOSPITAL OBSERVATION PER HR: HCPCS

## 2025-04-04 PROCEDURE — 5A1D70Z PERFORMANCE OF URINARY FILTRATION, INTERMITTENT, LESS THAN 6 HOURS PER DAY: ICD-10-PCS | Performed by: INTERNAL MEDICINE

## 2025-04-04 PROCEDURE — 83880 ASSAY OF NATRIURETIC PEPTIDE: CPT

## 2025-04-04 PROCEDURE — 93010 ELECTROCARDIOGRAM REPORT: CPT | Performed by: INTERNAL MEDICINE

## 2025-04-04 PROCEDURE — 99285 EMERGENCY DEPT VISIT HI MDM: CPT

## 2025-04-04 PROCEDURE — 96375 TX/PRO/DX INJ NEW DRUG ADDON: CPT

## 2025-04-04 PROCEDURE — 83735 ASSAY OF MAGNESIUM: CPT

## 2025-04-04 PROCEDURE — 82248 BILIRUBIN DIRECT: CPT

## 2025-04-04 PROCEDURE — 2140000000 HC CCU INTERMEDIATE R&B

## 2025-04-04 RX ORDER — GLUCAGON 1 MG/ML
1 KIT INJECTION PRN
Status: DISCONTINUED | OUTPATIENT
Start: 2025-04-04 | End: 2025-04-06 | Stop reason: HOSPADM

## 2025-04-04 RX ORDER — MAGNESIUM SULFATE IN WATER 40 MG/ML
2000 INJECTION, SOLUTION INTRAVENOUS PRN
Status: DISCONTINUED | OUTPATIENT
Start: 2025-04-04 | End: 2025-04-04

## 2025-04-04 RX ORDER — ONDANSETRON 4 MG/1
4 TABLET, ORALLY DISINTEGRATING ORAL EVERY 8 HOURS PRN
Status: DISCONTINUED | OUTPATIENT
Start: 2025-04-04 | End: 2025-04-06 | Stop reason: HOSPADM

## 2025-04-04 RX ORDER — ONDANSETRON 2 MG/ML
4 INJECTION INTRAMUSCULAR; INTRAVENOUS EVERY 6 HOURS PRN
Status: DISCONTINUED | OUTPATIENT
Start: 2025-04-04 | End: 2025-04-06 | Stop reason: HOSPADM

## 2025-04-04 RX ORDER — HYDROXYZINE PAMOATE 25 MG/1
50 CAPSULE ORAL 3 TIMES DAILY PRN
Status: DISCONTINUED | OUTPATIENT
Start: 2025-04-04 | End: 2025-04-06 | Stop reason: HOSPADM

## 2025-04-04 RX ORDER — SODIUM CHLORIDE 0.9 % (FLUSH) 0.9 %
5-40 SYRINGE (ML) INJECTION EVERY 12 HOURS SCHEDULED
Status: DISCONTINUED | OUTPATIENT
Start: 2025-04-04 | End: 2025-04-06 | Stop reason: HOSPADM

## 2025-04-04 RX ORDER — ACETAMINOPHEN 325 MG/1
650 TABLET ORAL EVERY 6 HOURS PRN
Status: DISCONTINUED | OUTPATIENT
Start: 2025-04-04 | End: 2025-04-06 | Stop reason: HOSPADM

## 2025-04-04 RX ORDER — ACETAMINOPHEN 650 MG/1
650 SUPPOSITORY RECTAL EVERY 6 HOURS PRN
Status: DISCONTINUED | OUTPATIENT
Start: 2025-04-04 | End: 2025-04-06 | Stop reason: HOSPADM

## 2025-04-04 RX ORDER — LABETALOL 100 MG/1
100 TABLET, FILM COATED ORAL EVERY 8 HOURS SCHEDULED
Status: DISCONTINUED | OUTPATIENT
Start: 2025-04-04 | End: 2025-04-06 | Stop reason: HOSPADM

## 2025-04-04 RX ORDER — ASPIRIN 81 MG/1
81 TABLET ORAL DAILY
Status: DISCONTINUED | OUTPATIENT
Start: 2025-04-04 | End: 2025-04-06 | Stop reason: HOSPADM

## 2025-04-04 RX ORDER — POLYETHYLENE GLYCOL 3350 17 G/17G
17 POWDER, FOR SOLUTION ORAL DAILY PRN
Status: DISCONTINUED | OUTPATIENT
Start: 2025-04-04 | End: 2025-04-06 | Stop reason: HOSPADM

## 2025-04-04 RX ORDER — POTASSIUM CHLORIDE 7.45 MG/ML
10 INJECTION INTRAVENOUS PRN
Status: DISCONTINUED | OUTPATIENT
Start: 2025-04-04 | End: 2025-04-04

## 2025-04-04 RX ORDER — AMIODARONE HYDROCHLORIDE 200 MG/1
200 TABLET ORAL DAILY
Status: DISCONTINUED | OUTPATIENT
Start: 2025-04-04 | End: 2025-04-06 | Stop reason: HOSPADM

## 2025-04-04 RX ORDER — HYDRALAZINE HYDROCHLORIDE 20 MG/ML
10 INJECTION INTRAMUSCULAR; INTRAVENOUS ONCE
Status: COMPLETED | OUTPATIENT
Start: 2025-04-04 | End: 2025-04-04

## 2025-04-04 RX ORDER — ATORVASTATIN CALCIUM 40 MG/1
40 TABLET, FILM COATED ORAL DAILY
Status: DISCONTINUED | OUTPATIENT
Start: 2025-04-04 | End: 2025-04-06 | Stop reason: HOSPADM

## 2025-04-04 RX ORDER — ISOSORBIDE MONONITRATE 60 MG/1
120 TABLET, EXTENDED RELEASE ORAL 2 TIMES DAILY
Status: DISCONTINUED | OUTPATIENT
Start: 2025-04-04 | End: 2025-04-06 | Stop reason: HOSPADM

## 2025-04-04 RX ORDER — SODIUM CHLORIDE 9 MG/ML
INJECTION, SOLUTION INTRAVENOUS PRN
Status: DISCONTINUED | OUTPATIENT
Start: 2025-04-04 | End: 2025-04-06 | Stop reason: HOSPADM

## 2025-04-04 RX ORDER — DEXTROSE MONOHYDRATE 100 MG/ML
INJECTION, SOLUTION INTRAVENOUS CONTINUOUS PRN
Status: DISCONTINUED | OUTPATIENT
Start: 2025-04-04 | End: 2025-04-06 | Stop reason: HOSPADM

## 2025-04-04 RX ORDER — POTASSIUM CHLORIDE 1500 MG/1
40 TABLET, EXTENDED RELEASE ORAL PRN
Status: DISCONTINUED | OUTPATIENT
Start: 2025-04-04 | End: 2025-04-04

## 2025-04-04 RX ORDER — FOLIC ACID 1 MG/1
1 TABLET ORAL DAILY
Status: DISCONTINUED | OUTPATIENT
Start: 2025-04-04 | End: 2025-04-06 | Stop reason: HOSPADM

## 2025-04-04 RX ORDER — SODIUM CHLORIDE 0.9 % (FLUSH) 0.9 %
10 SYRINGE (ML) INJECTION PRN
Status: DISCONTINUED | OUTPATIENT
Start: 2025-04-04 | End: 2025-04-06 | Stop reason: HOSPADM

## 2025-04-04 RX ORDER — HYDRALAZINE HYDROCHLORIDE 50 MG/1
100 TABLET, FILM COATED ORAL EVERY 8 HOURS
Status: DISCONTINUED | OUTPATIENT
Start: 2025-04-04 | End: 2025-04-06 | Stop reason: HOSPADM

## 2025-04-04 RX ADMIN — FOLIC ACID 1 MG: 1 TABLET ORAL at 17:05

## 2025-04-04 RX ADMIN — AMIODARONE HYDROCHLORIDE 200 MG: 200 TABLET ORAL at 17:55

## 2025-04-04 RX ADMIN — NICARDIPINE HYDROCHLORIDE 9 MG/HR: 25 INJECTION INTRAVENOUS at 10:36

## 2025-04-04 RX ADMIN — APIXABAN 2.5 MG: 2.5 TABLET, FILM COATED ORAL at 20:12

## 2025-04-04 RX ADMIN — HYDRALAZINE HYDROCHLORIDE 10 MG: 20 INJECTION INTRAMUSCULAR; INTRAVENOUS at 06:56

## 2025-04-04 RX ADMIN — HYDRALAZINE HYDROCHLORIDE 100 MG: 50 TABLET ORAL at 17:04

## 2025-04-04 RX ADMIN — LABETALOL HYDROCHLORIDE 100 MG: 100 TABLET, FILM COATED ORAL at 22:09

## 2025-04-04 RX ADMIN — ASPIRIN 81 MG: 81 TABLET, COATED ORAL at 17:55

## 2025-04-04 RX ADMIN — ISOSORBIDE MONONITRATE 120 MG: 60 TABLET, EXTENDED RELEASE ORAL at 20:12

## 2025-04-04 RX ADMIN — LABETALOL HYDROCHLORIDE 100 MG: 100 TABLET, FILM COATED ORAL at 17:55

## 2025-04-04 RX ADMIN — NICARDIPINE HYDROCHLORIDE 5 MG/HR: 25 INJECTION INTRAVENOUS at 07:41

## 2025-04-04 RX ADMIN — CLONIDINE HYDROCHLORIDE 0.3 MG: 0.1 TABLET ORAL at 20:13

## 2025-04-04 RX ADMIN — NICARDIPINE HYDROCHLORIDE 12.5 MG/HR: 25 INJECTION INTRAVENOUS at 18:47

## 2025-04-04 RX ADMIN — CLONIDINE HYDROCHLORIDE 0.3 MG: 0.1 TABLET ORAL at 17:04

## 2025-04-04 RX ADMIN — ATORVASTATIN CALCIUM 40 MG: 40 TABLET, FILM COATED ORAL at 20:12

## 2025-04-04 RX ADMIN — NICARDIPINE HYDROCHLORIDE 9 MG/HR: 25 INJECTION INTRAVENOUS at 11:21

## 2025-04-04 ASSESSMENT — PAIN - FUNCTIONAL ASSESSMENT
PAIN_FUNCTIONAL_ASSESSMENT: NONE - DENIES PAIN
PAIN_FUNCTIONAL_ASSESSMENT: NONE - DENIES PAIN
PAIN_FUNCTIONAL_ASSESSMENT: 0-10
PAIN_FUNCTIONAL_ASSESSMENT: NONE - DENIES PAIN

## 2025-04-04 ASSESSMENT — PAIN SCALES - GENERAL
PAINLEVEL_OUTOF10: 8
PAINLEVEL_OUTOF10: 8

## 2025-04-04 NOTE — ED NOTES
ED to inpatient nurses report      Chief Complaint:  Chief Complaint   Patient presents with    Generalized Body Aches     Present to ED from: Home    MOA:     LOC: alert and orientated to name, place, date  Mobility: Requires assistance * 1  Oxygen Baseline: 4L NC    Current needs required: 4L NC     Code Status:   Prior    What abnormal results were found and what did you give/do to treat them? Dialysis  Any procedures or intervention occur? Dialysis    Mental Status:  Level of Consciousness: Alert (0)    Psych Assessment:        Vitals:  Patient Vitals for the past 24 hrs:   BP Temp Temp src Pulse Resp SpO2 Height Weight   04/04/25 1418 (!) 180/77 -- -- 87 13 100 % -- --   04/04/25 1415 -- -- -- 89 14 100 % -- --   04/04/25 1414 -- -- -- 87 13 100 % -- --   04/04/25 1413 -- -- -- 88 20 100 % -- --   04/04/25 1412 -- -- -- 89 14 100 % -- --   04/04/25 1411 -- -- -- 89 13 100 % -- --   04/04/25 1410 -- -- -- 89 15 100 % -- --   04/04/25 1409 -- -- -- 91 15 100 % -- --   04/04/25 1408 -- -- -- 91 17 100 % -- --   04/04/25 1407 -- -- -- 91 17 94 % -- --   04/04/25 1406 -- -- -- 91 16 99 % -- --   04/04/25 1405 -- -- -- 91 18 99 % -- --   04/04/25 1404 -- -- -- 92 22 97 % -- --   04/04/25 1403 -- -- -- 91 18 97 % -- --   04/04/25 1402 -- -- -- 93 20 98 % -- --   04/04/25 1401 -- -- -- 91 19 96 % -- --   04/04/25 1400 (!) 173/74 -- -- 89 14 100 % -- --   04/04/25 1359 -- -- -- 89 14 100 % -- --   04/04/25 1358 -- -- -- 90 17 96 % -- --   04/04/25 1357 -- -- -- 89 13 99 % -- --   04/04/25 1310 (!) 162/76 97.6 °F (36.4 °C) -- 86 16 95 % -- 93.9 kg (207 lb 0.2 oz)   04/04/25 0845 (!) 157/79 98.4 °F (36.9 °C) -- 78 19 95 % -- 97.9 kg (215 lb 13.3 oz)   04/04/25 0817 (!) 176/71 -- -- 75 24 93 % -- --   04/04/25 0800 (!) 186/81 -- -- 77 20 96 % -- --   04/04/25 0715 (!) 206/96 -- -- 76 16 97 % -- --   04/04/25 0656 (!) 230/104 -- -- -- -- -- -- --   04/04/25 0630 (!) 210/105 -- -- 78 17 96 % -- --   04/04/25 0526 (!) 238/91  MEDICAL HISTORY       Past Medical History:   Diagnosis Date    AAA (abdominal aortic aneurysm)     NURIS (acute kidney injury) 9/24/2015    Anemia associated with chronic renal failure     Arthritis     stated in hands    Cocaine abuse 5/10/2014    Depression     Diabetes mellitus (HCC)     pt states he no longer has diabetes he has lost alot of davion.     FSGS (focal segmental glomerulosclerosis) 5/23/2013    Hemodialysis patient 10/17/2016    on hemodialysis with [facility]    Hemorrhoids 1/16/2012    History of blood transfusion     Hyperlipidemia     Hyperphosphatemia 5/21/2016    Hypertension     Left renal artery stenosis 5/22/2014    Monoclonal (M) protein disease, multiple 'M' protein     Nicotine dependence 6/16/2014    Noncompliance     Pneumonia     Psychiatric problem     PTSD (post-traumatic stress disorder)     Pt vet from [place]    Secondary hyperparathyroidism (of renal origin)     Sleep apnea            Electronically signed by Katja Bermeo RN on 4/4/2025 at 2:58 PM

## 2025-04-04 NOTE — ED PROVIDER NOTES
I performed a history and physical examination of the patient and discussed management with the resident. I reviewed the resident’s note and agree with the documented findings and plan of care. Any areas of disagreement are noted on the chart. I was personally present for the key portions of any procedures. I have documented in the chart those procedures where I was not present during the key portions. I have reviewed the emergency nurses triage note. I agree with the chief complaint, past medical history, past surgical history, allergies, medications, social and family history as documented unless otherwise noted below. Documentation of the HPI, Physical Exam and Medical Decision Making performed by medical students or scribes is based on my personal performance of the HPI, PE and MDM. For Phys Assistant/ Nurse Practitioner cases/documentation I have personally evaluated this patient and have completed at least one if not all key elements of the E/M (history, physical exam, and MDM). My findings are as noted below.    In other words, I personally saw and examined the patient I have reviewed and agreed with the resident findings including all diagnostic interpretations and treatment plans as written.  I was present for the key portion of any procedures performed and the inclusive time noted in any critical care statement.    Patient presents today for generalized bodyaches.  This is a 60-year-old -American male known to me presents today for generalized bodyaches.  Patient states he missed his dialysis.  Patient states that he did do some cocaine and alcohol.  Patient states that he just feels fatigued he has bodyaches he is not taking his blood pressure medications as directed.  Here today the patient is resting comfortably on the cot no apparent distress no real physical complaints at this time.    EKG reveals sinus rhythm with sinus arrhythmia first-degree AV block, left axis deviation, ventricular rate of 
received dialysis again tomorrow inpatient.  Hospitalist service reached out to her admission.    Vitals stable. Labs, EKG, Imaging obtained - discussed in ED course. Results discussed with patient. Patient verbalized understanding and agreement to plan -  in stable condition. Refer to ED course for additional information.      ED COURSE   ED Medications administered this visit (None if left blank):   Medications   niCARdipine (CARDENE) 25 mg in sodium chloride 0.9 % 250 mL infusion ([id number]) (14 mg/hr IntraVENous Rate/Dose Change 4/4/25 3458)   amiodarone (CORDARONE) tablet 200 mg (has no administration in time range)   apixaban (ELIQUIS) tablet 2.5 mg (has no administration in time range)   aspirin EC tablet 81 mg (has no administration in time range)   atorvastatin (LIPITOR) tablet 40 mg (has no administration in time range)   cloNIDine (CATAPRES) tablet 0.3 mg (has no administration in time range)   folic acid (FOLVITE) tablet 1 mg (has no administration in time range)   hydrALAZINE (APRESOLINE) tablet 100 mg (has no administration in time range)   hydrOXYzine pamoate (VISTARIL) capsule 50 mg (has no administration in time range)   isosorbide mononitrate (IMDUR) extended release tablet 120 mg (has no administration in time range)   labetalol (NORMODYNE) tablet 100 mg (has no administration in time range)   glucose chewable tablet 16 g (has no administration in time range)   dextrose bolus 10% 125 mL (has no administration in time range)     Or   dextrose bolus 10% 250 mL (has no administration in time range)   glucagon injection 1 mg (has no administration in time range)   dextrose 10 % infusion (has no administration in time range)   sodium chloride flush 0.9 % injection 5-40 mL (has no administration in time range)   sodium chloride flush 0.9 % injection 10 mL (has no administration in time range)   0.9 % sodium chloride infusion (has no administration in time range)   ondansetron (ZOFRAN-ODT) disintegrating

## 2025-04-04 NOTE — FLOWSHEET NOTE
04/04/25 0845 04/04/25 1310   Vital Signs   BP (!) 157/79 (!) 162/76   Temp 98.4 °F (36.9 °C) 97.6 °F (36.4 °C)   Pulse 78 86   Respirations 19 16   SpO2 95 % 95 %   Weight - Scale 97.9 kg (215 lb 13.3 oz) 93.9 kg (207 lb 0.2 oz)   Weight Method Standing scale Standing scale   Percent Weight Change 5.28 -4.09   Post-Hemodialysis Assessment   Post-Treatment Procedures  --  Blood returned;Access bleeding time < 10 minutes   Machine Disinfection Process  --  Acid/Vinegar Clean;Heat Disinfect;Exterior Machine Disinfection   Blood Volume Processed (Liters)  --  70 L   Dialyzer Clearance  --  Lightly streaked   Duration of Treatment (minutes)  --  240 minutes   Hemodialysis Intake (ml)  --  400 ml   Hemodialysis Output (ml)  --  4400 ml   NET Removed (ml)  --  4000   Tolerated Treatment  --  Good     Stable 4 hour TX completed. Removed 4 L of fluid, tolerated fluid removal well. Tolerated well. Pressure held to each needle site x 10 min. Drsg clean, dry, and intact upon leaving unit. Report called to primary RN. TX record printed for scanning into EMR.

## 2025-04-04 NOTE — ED NOTES
Report received from GARLAND Chen. Patient resting in bed. Respirations easy and unlabored. No distress noted. Call light within reach.

## 2025-04-04 NOTE — ED NOTES
Pt transported to [facility] in stable condition. Floor notified prior to transport.

## 2025-04-04 NOTE — CONSULTS
[facility]    [address], [place] [address]  [phone]     Hospital Consult  4/4/2025 8:59 AM    Pt Name:    Trav Jennings  MRN:     [id number]   [id number]  YOB: 1965  Admit Date:    4/4/2025  5:16 AM  Primary Care Physician:  Radha Hou, WADE - CNP        Reason for Consult:  ESRD    History:   The patient is a 60 y.o. male seen in renal consult for ESRD. He dialyzes TTS under the care of Dr. Ayon.  Additional comorbidities include HTN. Has hx of noncompliance with his dialysis treatments. He presented with shortness of breath and generalized body aches.  Recently used cocaine and EtOH.  Has not gone to dialysis since Saturday. Was hypoxic and placed on 4 L NC.  BP elevated. K was 6.2 with BUN of 132.  Nephrology consulted for his dialysis management.    Past Medical History:  Past Medical History:   Diagnosis Date    AAA (abdominal aortic aneurysm)     NURIS (acute kidney injury) 9/24/2015    Anemia associated with chronic renal failure     Arthritis     stated in hands    Cocaine abuse 5/10/2014    Depression     Diabetes mellitus (HCC)     pt states he no longer has diabetes he has lost alot of davion.     FSGS (focal segmental glomerulosclerosis) 5/23/2013    Hemodialysis patient 10/17/2016    on hemodialysis with [facility]    Hemorrhoids 1/16/2012    History of blood transfusion     Hyperlipidemia     Hyperphosphatemia 5/21/2016    Hypertension     Left renal artery stenosis 5/22/2014    Monoclonal (M) protein disease, multiple 'M' protein     Nicotine dependence 6/16/2014    Noncompliance     Pneumonia     Psychiatric problem     PTSD (post-traumatic stress disorder)     Pt vet from [facility]    Secondary hyperparathyroidism (of renal origin)     Sleep apnea        Past Surgical History:  Past Surgical History:   Procedure Laterality Date    ABDOMINAL AORTIC ANEURYSM REPAIR      BRONCHOSCOPY N/A 10/6/2022    BRONCHOSCOPY

## 2025-04-04 NOTE — ED NOTES
Pt back from dialysis. A&O x4. Breathing easy and unlabored on 4L via NC. Vitals updated. Call light within reach.

## 2025-04-04 NOTE — ED TRIAGE NOTES
Pt presents to ED from home w/ c/o general body aches and edema. Pt reports he is a dialysis pt and missed his last appt was 3/29 and he missed it due to ongoing family issues. Pt reports he uses oxygen at home and states baseline is 4L on NC. EKG completed at this time. Oxygen on room air is 84%. 4L NC applied.

## 2025-04-04 NOTE — H&P
History & Physical       Patient: Trav Jennings  YOB: 1965    MRN: [id number]     Acct: [id number]    PCP: Radha Hou APRN - CNP    Date of Admission: 4/4/2025    Date of Service: Patient seen / examined on 04/04/25 and admitted to Inpatient with expected LOS greater than two midnights due to medical therapy.       ASSESSMENT / PLAN:  Hypertensive urgency, resolved: In setting of ESRD with known left WHITNEY. Endorses compliance.  Recently admitted for hypertensive urgency/emergency complicated by flash pulmonary edema and fluid overload.  Continue home clonidine 0.3 mg p.o. 3 times daily, hydralazine 100 mg p.o. 3 times daily, Imdur 120 mg p.o. twice daily and labetalol 100 mg p.o. 3 times daily. On Nicardipine gtt. Resume home CCB as tolerable.   ESRD on HD: Associate with secondary hyperparathyroidism and FSGS. Follows output with Dr. Ayon, nephrology.  Undergoes HD T/TH/Sat.  Limit nephrotoxic agents. Nephrology following  HLD: Continue Lipitor 40 mg p.o. daily  Paroxysmal atrial fibrillation: [id number].  On amiodarone for rhythm control and eliquis for anticoagulation.  Initial EKG not A-fib.  Placed on continuous telemetry.   History of TAA/AAA: With dissection s/p aortic stent graft (Transverse Ao arch -> distal Ashleigh) 7/2018.   Chronic HFpEF, NYHA II: Likely NICM.  Last ECHO EF 60-65%, concentric hypertrophy, mild MR 12/2024. On BB, no diuretic or SLGTi. Nephrology following for fluid management. 2 g sodium restriction. Strict I&Os. Monitor daily weights.   Chronic microcytic anemia: Secondary to ACD and BRISEIDA in setting of ESRD.  Baseline Hgb ~ 8-9. Presented Hgb 7.0. Remote history of AVF hemorrhage. S/p BC-AVF ligation with excision of aneurysmal portion followed by reconstruction of graft with vascular surgery.  No obvious bleeding noted. Previously on Retacrit  REZA: Noncompliant with CPAP.  Chronic hypoxic respiratory failure: On baseline 4 L home O2.  PTSD/MDD: On clonidine.  No SSRI/SNRIs. Advise outpatient PCP follow up  History of Polysubstance abuse: endorsing EtOH and cocaine use recently. UDS pending      Chief Complaint:  Generalized body aches and edema    History of Present Illness:  The patient is a 60-year-old male with a PMH of HTN, HLD, TAA/AAA s/p aortic stent graft, FSGS, ESRD on HD, secondary hyperparathyroidism, REZA, chronic hypoxic respiratory failure on 4 L NC, anemia, GERD, MDD, polysubstance abuse including cocaine/EtOH, tobacco abuse who presented to [facility] for complaints of generalized body aches and edema. This was associated with shortness of breath. Per report, the patient endorses recent use of cocaine and alcohol. He missed his HD sessions since last Saturday.     In the ED, vitals hypertensive with SBP 230s. Labs significant for K 6.2, CO2 17, , Cr 17.4, AG 22, eGFR 3, Mg 2.9, serum Osm 316.7, Hgb 8.4, HCT 26.5. , proBNO 88473. EKG with sinus arrhythmia with 1st degree AVB, VT 84 and Qtc 527. Serum EtOH negative. UDS ordered. CXR no acute intrathoracic process, pleural effusion vs pleural thickening. He was given hydralazine x1, started on nicardipine gtt and nephrology was consulted. The patient was admitted for further management and evaluation.       Past Medical History:    Past Medical History:   Diagnosis Date    AAA (abdominal aortic aneurysm)     NURIS (acute kidney injury) 9/24/2015    Anemia associated with chronic renal failure     Arthritis     stated in hands    Cocaine abuse 5/10/2014    Depression     Diabetes mellitus (HCC)     pt states he no longer has diabetes he has lost alot of davion.     FSGS (focal segmental glomerulosclerosis) 5/23/2013    Hemodialysis patient 10/17/2016    on hemodialysis with [facility]    Hemorrhoids 1/16/2012    History of blood transfusion     Hyperlipidemia     Hyperphosphatemia 5/21/2016    Hypertension     Left renal artery stenosis 5/22/2014    Monoclonal (M) protein

## 2025-04-05 PROBLEM — R79.89 ELEVATED TROPONIN: Status: ACTIVE | Noted: 2025-04-05

## 2025-04-05 LAB
ANION GAP SERPL CALC-SCNC: 18 MEQ/L (ref 8–16)
BUN SERPL-MCNC: 75 MG/DL (ref 8–23)
CALCIUM SERPL-MCNC: 7.7 MG/DL (ref 8.6–10)
CHLORIDE SERPL-SCNC: 102 MEQ/L (ref 98–111)
CO2 SERPL-SCNC: 18 MEQ/L (ref 22–29)
CREAT SERPL-MCNC: 10.9 MG/DL (ref 0.7–1.2)
DEPRECATED RDW RBC AUTO: 61 FL (ref 35–45)
ERYTHROCYTE [DISTWIDTH] IN BLOOD BY AUTOMATED COUNT: 15.2 % (ref 11.5–14.5)
GFR SERPL CREATININE-BSD FRML MDRD: 5 ML/MIN/1.73M2
GLUCOSE SERPL-MCNC: 113 MG/DL (ref 74–109)
HCT VFR BLD AUTO: 25.4 % (ref 42–52)
HGB BLD-MCNC: 7.4 GM/DL (ref 14–18)
MCH RBC QN AUTO: 31.6 PG (ref 26–33)
MCHC RBC AUTO-ENTMCNC: 29.1 GM/DL (ref 32.2–35.5)
MCV RBC AUTO: 108.5 FL (ref 80–94)
PLATELET # BLD AUTO: 104 THOU/MM3 (ref 130–400)
PMV BLD AUTO: 10.6 FL (ref 9.4–12.4)
POTASSIUM SERPL-SCNC: 5.6 MEQ/L (ref 3.5–5.2)
RBC # BLD AUTO: 2.34 MILL/MM3 (ref 4.7–6.1)
SODIUM SERPL-SCNC: 138 MEQ/L (ref 135–145)
TROPONIN, HIGH SENSITIVITY: 107 NG/L (ref 0–12)
TROPONIN, HIGH SENSITIVITY: 110 NG/L (ref 0–12)
WBC # BLD AUTO: 4.7 THOU/MM3 (ref 4.8–10.8)

## 2025-04-05 PROCEDURE — G0378 HOSPITAL OBSERVATION PER HR: HCPCS

## 2025-04-05 PROCEDURE — 36415 COLL VENOUS BLD VENIPUNCTURE: CPT

## 2025-04-05 PROCEDURE — 90935 HEMODIALYSIS ONE EVALUATION: CPT

## 2025-04-05 PROCEDURE — 90935 HEMODIALYSIS ONE EVALUATION: CPT | Performed by: INTERNAL MEDICINE

## 2025-04-05 PROCEDURE — 80048 BASIC METABOLIC PNL TOTAL CA: CPT

## 2025-04-05 PROCEDURE — 85027 COMPLETE CBC AUTOMATED: CPT

## 2025-04-05 PROCEDURE — 2140000000 HC CCU INTERMEDIATE R&B

## 2025-04-05 PROCEDURE — 2500000003 HC RX 250 WO HCPCS: Performed by: STUDENT IN AN ORGANIZED HEALTH CARE EDUCATION/TRAINING PROGRAM

## 2025-04-05 PROCEDURE — 84484 ASSAY OF TROPONIN QUANT: CPT

## 2025-04-05 PROCEDURE — 93005 ELECTROCARDIOGRAM TRACING: CPT

## 2025-04-05 PROCEDURE — 99231 SBSQ HOSP IP/OBS SF/LOW 25: CPT

## 2025-04-05 PROCEDURE — 6370000000 HC RX 637 (ALT 250 FOR IP): Performed by: STUDENT IN AN ORGANIZED HEALTH CARE EDUCATION/TRAINING PROGRAM

## 2025-04-05 RX ADMIN — ISOSORBIDE MONONITRATE 120 MG: 60 TABLET, EXTENDED RELEASE ORAL at 20:26

## 2025-04-05 RX ADMIN — APIXABAN 2.5 MG: 2.5 TABLET, FILM COATED ORAL at 13:00

## 2025-04-05 RX ADMIN — SODIUM CHLORIDE, PRESERVATIVE FREE 10 ML: 5 INJECTION INTRAVENOUS at 13:01

## 2025-04-05 RX ADMIN — ATORVASTATIN CALCIUM 40 MG: 40 TABLET, FILM COATED ORAL at 20:26

## 2025-04-05 RX ADMIN — ASPIRIN 81 MG: 81 TABLET, COATED ORAL at 13:01

## 2025-04-05 RX ADMIN — HYDRALAZINE HYDROCHLORIDE 100 MG: 50 TABLET ORAL at 16:57

## 2025-04-05 RX ADMIN — HYDRALAZINE HYDROCHLORIDE 100 MG: 50 TABLET ORAL at 01:20

## 2025-04-05 RX ADMIN — AMIODARONE HYDROCHLORIDE 200 MG: 200 TABLET ORAL at 13:01

## 2025-04-05 RX ADMIN — FOLIC ACID 1 MG: 1 TABLET ORAL at 13:00

## 2025-04-05 RX ADMIN — LABETALOL HYDROCHLORIDE 100 MG: 100 TABLET, FILM COATED ORAL at 05:41

## 2025-04-05 RX ADMIN — ISOSORBIDE MONONITRATE 120 MG: 60 TABLET, EXTENDED RELEASE ORAL at 13:03

## 2025-04-05 RX ADMIN — LABETALOL HYDROCHLORIDE 100 MG: 100 TABLET, FILM COATED ORAL at 13:00

## 2025-04-05 RX ADMIN — CLONIDINE HYDROCHLORIDE 0.3 MG: 0.1 TABLET ORAL at 13:00

## 2025-04-05 RX ADMIN — APIXABAN 2.5 MG: 2.5 TABLET, FILM COATED ORAL at 20:26

## 2025-04-05 RX ADMIN — SODIUM CHLORIDE, PRESERVATIVE FREE 10 ML: 5 INJECTION INTRAVENOUS at 20:26

## 2025-04-05 NOTE — PROGRESS NOTES
[facility]   Nephrology progress note  4/5/2025, 11:08 AM      Pt Name:    Trav Jennings  MRN:     [id number]     YOB: 1965  Admit Date:    4/4/2025  5:16 AM    Chief Complaint: Nephrology following for ESRD.    Subjective:  Patient was seen and examined this morning during HD.   BP in 130s systolic. UF 1 liter. He is actually at his dry weight today.   He feels better.    Objective:  24HR INTAKE/OUTPUT:    Intake/Output Summary (Last 24 hours) at 4/5/2025 1108  Last data filed at 4/4/2025 1736  Gross per 24 hour   Intake 640 ml   Output 4400 ml   Net -3760 ml         I/O last 3 completed shifts:  In: 640 [P.O.:240]  Out: 4400   No intake/output data recorded.   Admission weight: 93 kg (205 lb)  Wt Readings from Last 3 Encounters:   04/05/25 94.3 kg (207 lb 14.3 oz)   03/21/25 109.6 kg (241 lb 10 oz)   03/11/25 96.9 kg (213 lb 10 oz)        Vitals :   Vitals:    04/05/25 0329 04/05/25 0540 04/05/25 0735 04/05/25 0800   BP: (!) 139/58 (!) 149/59  (!) 130/59   Pulse: 86 65  67   Resp: 18 17  19   Temp: 98.3 °F (36.8 °C)   98.4 °F (36.9 °C)   TempSrc: Axillary      SpO2: 96%   96%   Weight:   94.3 kg (207 lb 14.3 oz) 94.3 kg (207 lb 14.3 oz)   Height:           Physical examination  General Appearance: alert and cooperative with exam, appears comfortable, no distress  Mouth/Throat: Oral mucosa moist  Neck: No JVD  Lungs: Air entry B/L, no rales, no use of accessory muscles  Heart:  S1, S2 heard  GI: soft, non-tender, no guarding  Extremities: no leg edema    Medications:  Infusion:    niCARdipine Stopped (04/04/25 2210)    dextrose      sodium chloride       Meds:    amiodarone  200 mg Oral Daily    apixaban  2.5 mg Oral BID    aspirin  81 mg Oral Daily    atorvastatin  40 mg Oral Daily    cloNIDine  0.3 mg Oral TID    folic acid  1 mg Oral Daily    hydrALAZINE  100 mg Oral q8h    isosorbide mononitrate  120 mg Oral BID    labetalol  100 mg Oral 3 times per day    sodium chloride

## 2025-04-05 NOTE — PLAN OF CARE
Attempt to see pt at this time,however pt not in his room, currently in dialysis. Will attempt to see pt in dialysis shortly.       Electronically signed by Ashlee Yeung PA-C on 4/5/2025 at 9:34 AM

## 2025-04-05 NOTE — PLAN OF CARE
Problem: Chronic Conditions and Co-morbidities  Goal: Patient's chronic conditions and co-morbidity symptoms are monitored and maintained or improved  Outcome: Progressing  Flowsheets (Taken 4/5/2025 0844)  Care Plan - Patient's Chronic Conditions and Co-Morbidity Symptoms are Monitored and Maintained or Improved: Monitor and assess patient's chronic conditions and comorbid symptoms for stability, deterioration, or improvement     Problem: Discharge Planning  Goal: Discharge to home or other facility with appropriate resources  Outcome: Progressing  Flowsheets (Taken 4/5/2025 0844)  Discharge to home or other facility with appropriate resources:   Identify barriers to discharge with patient and caregiver   Arrange for needed discharge resources and transportation as appropriate     Problem: Safety - Adult  Goal: Free from fall injury  Outcome: Progressing  Flowsheets (Taken 4/5/2025 0844)  Free From Fall Injury: Instruct family/caregiver on patient safety     Problem: ABCDS Injury Assessment  Goal: Absence of physical injury  Outcome: Progressing  Flowsheets (Taken 4/5/2025 0844)  Absence of Physical Injury: Implement safety measures based on patient assessment

## 2025-04-05 NOTE — FLOWSHEET NOTE
04/05/25 1245   Vital Signs   BP (!) 170/77   Temp 99 °F (37.2 °C)   Pulse 73   Respirations 17   SpO2 97 %   Weight - Scale 93.3 kg (205 lb 11 oz)   Percent Weight Change -1.06   Post-Hemodialysis Assessment   Post-Treatment Procedures Blood returned;Access bleeding time < 10 minutes   Machine Disinfection Process Acid/Vinegar Clean;Heat Disinfect;Exterior Machine Disinfection   Rinseback Volume (ml) 400 ml   Blood Volume Processed (Liters) 112.2 L   Dialyzer Clearance Moderately streaked   Duration of Treatment (minutes) 240 minutes   Hemodialysis Intake (ml) 400 ml   Hemodialysis Output (ml) 1400 ml   NET Removed (ml) 1000   Tolerated Treatment Good     completed 4 hr HD TX and removed 1 Liter of fluid. pt tolerated HD TX well. Dialysis TX ended, all blood returned with 400 mls rinse back. removed X2 (15g Needles), applied sterile gauze with light pressure for less than 10 min. pt returned to his room with left upper, arm dressing clean, dry and intact. report given to primary nurse. record completed and printed to be scanned into pt's EMR.

## 2025-04-05 NOTE — CARE COORDINATION
04/05/25 1045   Readmission Assessment   Number of Days since last admission? 8-30 days   Previous Disposition Home with Family   Who is being Interviewed Patient   What was the patient's/caregiver's perception as to why they think they needed to return back to the hospital? Other (Comment)  (weakness)   Did you visit your Primary Care Physician after you left the hospital, before you returned this time? No   Why weren't you able to visit your PCP? Did not have an appointment   Did you see a specialist, such as Cardiac, Pulmonary, Orthopedic Physician, etc. after you left the hospital? Yes   Who advised the patient to return to the hospital? Self-referral   Does the patient report anything that got in the way of taking their medications? No   In our efforts to provide the best possible care to you and others like you, can you think of anything that we could have done to help you after you left the hospital the first time, so that you might not have needed to return so soon? Other (Comment)  (not this time)

## 2025-04-05 NOTE — PROGRESS NOTES
Hospitalist Progress Note    Patient:  Trav Jennings    Unit/Bed:3B-23/023-A  YOB: 1965  MRN: [id number]   Acct: [id number]   PCP: Radha Hou APRN - CNP  Date of Admission: 4/4/2025    ASSESSMENT AND PLAN  Active Problems  Hyperkalemia, with ESRD on HD: Associated with secondary hyperparathyroidism and FSGS. Follows outpt with Dr. Ayon, nephrology. On HD T/TR/Sat. Recently missed two treatments. Nephrology onboard.   K 5.6 today 4/5  Received HD 4/4, and  planning HD again today. Repeat BMP in AM  Elevated troponin: Trop 121 on 4/4, no trend before or after noted. Chronic elevation likely in the setting of demand ischemia from ESRD. Trop trend flat today. Pt denies CP at this time. EKG without acute ischemic changes.       Resolved Problems  HTN urgency- in setting of ESRD, with known left WHITNEY, recent cocaine use. Off of nicardipine. Continue  to monitor closely    Chronic Conditions (reviewed and stable unless otherwise stated):   Paroxysmal A. Fib: on Amiodarone, Eliquis. Rate controlled.  HLD: on statin  Hx of TAA/AAA: Hx of dissection s/p aortic stent graft 7/2018  HFpEF: Last ECHO 12/06/2024 EF shows 60-65% with severe concentric hypertrophy, abnormal diastolic dysfunction. On BB, no diuretic or SLGTi.  2 g sodium restriction, 2L fluid restriction. Strict I&Os. Monitor daily weights.   Chronic microcytic anemia:  Secondary to BRISEIDA in setting of ESRD.  Baseline Hgb ~ 8-9. Presented Hgb 7.0. Remote history of AVF hemorrhage. S/p BC-AVF ligation with excision of aneurysmal portion followed by reconstruction of graft with vascular surgery.  No obvious bleeding noted. Previously on Retacrit   REZA: Noncompliant with CPAP.  Chronic hypoxic respiratory failure: On baseline 4 L home O2.  PTSD/MDD: On clonidine. No SSRI/SNRIs. Advise outpatient PCP follow up  History of Polysubstance abuse: endorsing EtOH and cocaine use  PORTABLE  Result Date: 4/4/2025  PROCEDURE: XR CHEST PORTABLE CLINICAL INFORMATION: SOB, ESRD on HD. COMPARISON: Chest x-ray 3/17/2025. TECHNIQUE: AP upright view of the chest. FINDINGS: There is a stable appearing stent in the aortic arch and descending thoracic aorta. Mediastinal contours are stable. There is stable enlargement of the cardiac silhouette.  There is underaeration of both lung bases. There is a small pleural effusion versus pleural thickening on the right. Upper lung zones appear well aerated. The pulmonary vascularity is normal.     Stable radiographic appearance of the chest with under aeration of both lung bases and a pleural effusion versus pleural thickening in the right lung base. **This report has been created using voice recognition software. It may contain minor errors which are inherent in voice recognition technology.** Electronically signed by Dr. Jessica Horne    4/4/2025 vs 3/17/2025        Electronically signed by Ashlee Yeung PA-C on 4/5/2025 at 7:44 AM

## 2025-04-05 NOTE — PROCEDURES
PROCEDURE NOTE  Date: 4/5/2025   Name: Trav Jennings  YOB: 1965    Procedures EKG completed, given to RN

## 2025-04-06 VITALS
RESPIRATION RATE: 18 BRPM | HEART RATE: 71 BPM | WEIGHT: 205.69 LBS | OXYGEN SATURATION: 100 % | SYSTOLIC BLOOD PRESSURE: 131 MMHG | BODY MASS INDEX: 25.57 KG/M2 | DIASTOLIC BLOOD PRESSURE: 64 MMHG | HEIGHT: 75 IN | TEMPERATURE: 98.3 F

## 2025-04-06 LAB
ANION GAP SERPL CALC-SCNC: 12 MEQ/L (ref 8–16)
BUN SERPL-MCNC: 35 MG/DL (ref 8–23)
CALCIUM SERPL-MCNC: 8.3 MG/DL (ref 8.6–10)
CHLORIDE SERPL-SCNC: 105 MEQ/L (ref 98–111)
CO2 SERPL-SCNC: 23 MEQ/L (ref 22–29)
CREAT SERPL-MCNC: 6.8 MG/DL (ref 0.7–1.2)
DEPRECATED RDW RBC AUTO: 57.8 FL (ref 35–45)
EKG ATRIAL RATE: 72 BPM
EKG P AXIS: 45 DEGREES
EKG P-R INTERVAL: 210 MS
EKG Q-T INTERVAL: 466 MS
EKG QRS DURATION: 114 MS
EKG QTC CALCULATION (BAZETT): 510 MS
EKG R AXIS: -44 DEGREES
EKG T AXIS: 80 DEGREES
EKG VENTRICULAR RATE: 72 BPM
ERYTHROCYTE [DISTWIDTH] IN BLOOD BY AUTOMATED COUNT: 15 % (ref 11.5–14.5)
GFR SERPL CREATININE-BSD FRML MDRD: 9 ML/MIN/1.73M2
GLUCOSE SERPL-MCNC: 105 MG/DL (ref 74–109)
HCT VFR BLD AUTO: 24 % (ref 42–52)
HGB BLD-MCNC: 7.2 GM/DL (ref 14–18)
MCH RBC QN AUTO: 31.7 PG (ref 26–33)
MCHC RBC AUTO-ENTMCNC: 30 GM/DL (ref 32.2–35.5)
MCV RBC AUTO: 105.7 FL (ref 80–94)
PLATELET # BLD AUTO: 88 THOU/MM3 (ref 130–400)
PMV BLD AUTO: 10.6 FL (ref 9.4–12.4)
POTASSIUM SERPL-SCNC: 5.1 MEQ/L (ref 3.5–5.2)
RBC # BLD AUTO: 2.27 MILL/MM3 (ref 4.7–6.1)
SCAN OF BLOOD SMEAR: NORMAL
SODIUM SERPL-SCNC: 140 MEQ/L (ref 135–145)
WBC # BLD AUTO: 4.3 THOU/MM3 (ref 4.8–10.8)

## 2025-04-06 PROCEDURE — 2500000003 HC RX 250 WO HCPCS: Performed by: STUDENT IN AN ORGANIZED HEALTH CARE EDUCATION/TRAINING PROGRAM

## 2025-04-06 PROCEDURE — 36415 COLL VENOUS BLD VENIPUNCTURE: CPT

## 2025-04-06 PROCEDURE — 80048 BASIC METABOLIC PNL TOTAL CA: CPT

## 2025-04-06 PROCEDURE — G0378 HOSPITAL OBSERVATION PER HR: HCPCS

## 2025-04-06 PROCEDURE — 99232 SBSQ HOSP IP/OBS MODERATE 35: CPT | Performed by: INTERNAL MEDICINE

## 2025-04-06 PROCEDURE — 99238 HOSP IP/OBS DSCHRG MGMT 30/<: CPT | Performed by: OPHTHALMOLOGY

## 2025-04-06 PROCEDURE — 6370000000 HC RX 637 (ALT 250 FOR IP): Performed by: STUDENT IN AN ORGANIZED HEALTH CARE EDUCATION/TRAINING PROGRAM

## 2025-04-06 PROCEDURE — 85027 COMPLETE CBC AUTOMATED: CPT

## 2025-04-06 PROCEDURE — 93010 ELECTROCARDIOGRAM REPORT: CPT | Performed by: INTERNAL MEDICINE

## 2025-04-06 RX ADMIN — ASPIRIN 81 MG: 81 TABLET, COATED ORAL at 08:37

## 2025-04-06 RX ADMIN — SODIUM CHLORIDE, PRESERVATIVE FREE 10 ML: 5 INJECTION INTRAVENOUS at 08:37

## 2025-04-06 RX ADMIN — FOLIC ACID 1 MG: 1 TABLET ORAL at 08:39

## 2025-04-06 RX ADMIN — APIXABAN 2.5 MG: 2.5 TABLET, FILM COATED ORAL at 08:37

## 2025-04-06 RX ADMIN — AMIODARONE HYDROCHLORIDE 200 MG: 200 TABLET ORAL at 08:37

## 2025-04-06 RX ADMIN — LABETALOL HYDROCHLORIDE 100 MG: 100 TABLET, FILM COATED ORAL at 05:30

## 2025-04-06 RX ADMIN — HYDRALAZINE HYDROCHLORIDE 100 MG: 50 TABLET ORAL at 08:37

## 2025-04-06 RX ADMIN — CLONIDINE HYDROCHLORIDE 0.3 MG: 0.1 TABLET ORAL at 08:39

## 2025-04-06 RX ADMIN — ISOSORBIDE MONONITRATE 120 MG: 60 TABLET, EXTENDED RELEASE ORAL at 08:37

## 2025-04-06 ASSESSMENT — LIFESTYLE VARIABLES
HOW OFTEN DO YOU HAVE A DRINK CONTAINING ALCOHOL: 2-3 TIMES A WEEK
HOW MANY STANDARD DRINKS CONTAINING ALCOHOL DO YOU HAVE ON A TYPICAL DAY: 1 OR 2

## 2025-04-06 ASSESSMENT — PATIENT HEALTH QUESTIONNAIRE - PHQ9
SUM OF ALL RESPONSES TO PHQ QUESTIONS 1-9: 0
SUM OF ALL RESPONSES TO PHQ QUESTIONS 1-9: 0
1. LITTLE INTEREST OR PLEASURE IN DOING THINGS: NOT AT ALL
SUM OF ALL RESPONSES TO PHQ QUESTIONS 1-9: 0
SUM OF ALL RESPONSES TO PHQ QUESTIONS 1-9: 0
2. FEELING DOWN, DEPRESSED OR HOPELESS: NOT AT ALL

## 2025-04-06 ASSESSMENT — PAIN SCALES - GENERAL: PAINLEVEL_OUTOF10: 0

## 2025-04-06 NOTE — PLAN OF CARE
Problem: Chronic Conditions and Co-morbidities  Goal: Patient's chronic conditions and co-morbidity symptoms are monitored and maintained or improved  Outcome: Progressing  Flowsheets (Taken 4/5/2025 0844 by Alpa Ferreira RN)  Care Plan - Patient's Chronic Conditions and Co-Morbidity Symptoms are Monitored and Maintained or Improved: Monitor and assess patient's chronic conditions and comorbid symptoms for stability, deterioration, or improvement     Problem: Discharge Planning  Goal: Discharge to home or other facility with appropriate resources  Outcome: Progressing  Flowsheets (Taken 4/5/2025 0844 by Alpa Ferreira RN)  Discharge to home or other facility with appropriate resources:   Identify barriers to discharge with patient and caregiver   Arrange for needed discharge resources and transportation as appropriate     Problem: Safety - Adult  Goal: Free from fall injury  Outcome: Progressing  Flowsheets (Taken 4/5/2025 0844 by Alpa Ferreira RN)  Free From Fall Injury: Instruct family/caregiver on patient safety     Problem: ABCDS Injury Assessment  Goal: Absence of physical injury  Outcome: Progressing  Flowsheets (Taken 4/5/2025 0844 by Alpa Ferreira RN)  Absence of Physical Injury: Implement safety measures based on patient assessment

## 2025-04-06 NOTE — DISCHARGE SUMMARY
Hospitalist Discharge Summary    Patient:  Trav Jennings  YOB: 1965    MRN: [id number]   Acct: [id number]    Primary Care Physician: Radha Hou APRN - CNP    Admit date:  4/4/2025    Discharge date:  4/6/2025    Discharge Diagnoses:  Principal Problem:    Hypertensive emergency  Active Problems:    Generalized weakness    Elevated troponin  Resolved Problems:    * No resolved hospital problems. *      Discharge Medications:         Medication List        CONTINUE taking these medications      amiodarone 200 MG tablet  Commonly known as: CORDARONE  Take 1 tablet by mouth daily     amLODIPine 5 MG tablet  Commonly known as: NORVASC  Take 1 tablet by mouth daily     apixaban 2.5 MG Tabs tablet  Commonly known as: ELIQUIS  Take 1 tablet by mouth 2 times daily     aspirin 81 MG EC tablet  Take 1 tablet by mouth daily     atorvastatin 80 MG tablet  Commonly known as: LIPITOR     cloNIDine 0.3 MG tablet  Commonly known as: CATAPRES  Take 1 tablet by mouth 3 times daily     folic acid 1 MG tablet  Commonly known as: FOLVITE  Take 1 tablet by mouth daily     hydrALAZINE 100 MG tablet  Commonly known as: APRESOLINE  Take 1 tablet by mouth every 8 (eight) hours     hydrOXYzine pamoate 50 MG capsule  Commonly known as: VISTARIL     isosorbide mononitrate 120 MG extended release tablet  Commonly known as: IMDUR  Take 1 tablet by mouth in the morning and at bedtime     labetalol 100 MG tablet  Commonly known as: NORMODYNE  Take 1 tablet by mouth every 8 hours              Diet:  ADULT DIET; Regular; Low Sodium (2 gm); Low Potassium (Less than 3000 mg/day); 2000 ml    Activity:  Activity as tolerated (Patient may move about with assist as indicated or with supervision.)    Follow-up:  in 1-2 weeks with Radha Hou APRN - CNP. To the specialist as arranged     Consultants:  nephrology    Procedures:  HD    Diagnostic Test:      Recent Labs     04/04/25  0655 04/05/25  0430 04/06/25  0454   WBC 7.8 4.7*

## 2025-04-06 NOTE — CONSULTS
Brief Intervention and Referral to Treatment Summary    Patient was provided PHQ-9, AUDIT-C and DAST Screening:      PHQ-9 Score: 0  AUDIT-C Score:  6  DAST Score:  4    Patient’s substance use is considered     Moderate Risk      Patient’s depression is considered:     Minimal    Brief Education Was Provided    Patient was not receptive      Brief Intervention(s) Provided  at time of screening(Only for AUDIT-C or DAST)     Document Brief Intervention (corresponds directly with the 5 A's, Ask, Advise, Assess, Assist, and Arrange): Use examples below.  NA is not to be used.  Pt declined any education and declined resource packet.     At- Risk Patients (Score 7-15 for women; 8-15 for men) MUST HAVE A BRIEF INTERVENTION IDENTIFIED  Discuss concern patient is drinking at unhealthy levels known to increase risk of alcohol-related health problems.    Is Patient ready to commit to change? No    If No:  Encourage reflection  Discuss short term and long term health risks of consuming alcohol  Barriers to change  Reaffirm willingness to help / Educational or Resource materials provided    If Yes: (Score 7-15 for women; 8-15 for men)  Set goal  Plan  Educational and Resource materials provided  Offer to call with the patient to schedule appointment with a counseling center    Harmful use or Dependence (Score 16 or greater)  Discuss short term and long term health risks of consuming alcohol  Recommendations  Recommend addiction specialist/center  Offer to call with the patient to schedule appointment with a counseling center    Intervention Completed:  no  When intervention is NOT completed you must provide your justification: Pt declined any intervention. Pt reports he has been using since he was 14 years old.

## 2025-04-06 NOTE — PROGRESS NOTES
[facility]   Nephrology progress note  4/6/2025, 11:20 AM      Pt Name:    Trav Jennings  MRN:     [id number]     YOB: 1965  Admit Date:    4/4/2025  5:16 AM    Chief Complaint: Nephrology following for ESRD.    Subjective:  Patient was seen and examined this morning .   Bp better.   No complaints.    Objective:  24HR INTAKE/OUTPUT:    Intake/Output Summary (Last 24 hours) at 4/6/2025 1120  Last data filed at 4/6/2025 0917  Gross per 24 hour   Intake 880 ml   Output 1400 ml   Net -520 ml         I/O last 3 completed shifts:  In: 640 [P.O.:240]  Out: 1400   I/O this shift:  In: 240 [P.O.:240]  Out: -    Admission weight: 93 kg (205 lb)  Wt Readings from Last 3 Encounters:   04/05/25 93.3 kg (205 lb 11 oz)   03/21/25 109.6 kg (241 lb 10 oz)   03/11/25 96.9 kg (213 lb 10 oz)        Vitals :   Vitals:    04/05/25 2211 04/05/25 2314 04/06/25 0530 04/06/25 0745   BP: (!) 108/49 (!) 108/57 (!) 147/71 131/64   Pulse:  77 74 71   Resp:  18 17 18   Temp:  98.3 °F (36.8 °C) 98.1 °F (36.7 °C) 98.3 °F (36.8 °C)   TempSrc:  Oral Oral Oral   SpO2:  100% 100% 100%   Weight:       Height:           Physical examination  General Appearance: alert and cooperative with exam, appears comfortable, no distress  Mouth/Throat: Oral mucosa moist  Neck: No JVD  Lungs: Air entry B/L, no rales, no use of accessory muscles  Heart:  S1, S2 heard  GI: soft, non-tender, no guarding  Extremities: no leg edema    Medications:  Infusion:    dextrose      sodium chloride       Meds:    amiodarone  200 mg Oral Daily    apixaban  2.5 mg Oral BID    aspirin  81 mg Oral Daily    atorvastatin  40 mg Oral Daily    cloNIDine  0.3 mg Oral TID    folic acid  1 mg Oral Daily    hydrALAZINE  100 mg Oral q8h    isosorbide mononitrate  120 mg Oral BID    labetalol  100 mg Oral 3 times per day    sodium chloride flush  5-40 mL IntraVENous 2 times per day     Meds prn: hydrOXYzine pamoate, glucose, dextrose bolus **OR** dextrose  bolus, glucagon (rDNA), dextrose, sodium chloride flush, sodium chloride, ondansetron **OR** ondansetron, polyethylene glycol, acetaminophen **OR** acetaminophen     Lab Data :  CBC:   Recent Labs     04/04/25  0655 04/05/25 0430 04/06/25  0454   WBC 7.8 4.7* 4.3*   HGB 8.4* 7.4* 7.2*   HCT 26.5* 25.4* 24.0*    104* 88*     CMP:  Recent Labs     04/04/25  0655 04/05/25  0430 04/06/25  0454    138 140   K 6.2* 5.6* 5.1   CL 98 102 105   CO2 17* 18* 23   * 75* 35*   CREATININE 17.4* 10.9* 6.8*   GLUCOSE 103 113* 105   CALCIUM 9.4 7.7* 8.3*   MG 2.9*  --   --      Hepatic:   Recent Labs     04/04/25  0655   AST 22   ALT 8*   BILITOT 0.4   ALKPHOS 105         Assessment and Plan:  ESRD on HD TTS  Missed 2 treatments last week and came in with hyperkalemia. Azotemia, HTN, volume overload .   Had HD Fri and Sat. Doing better  Hyperkalemia: resolved  HTN: improved  Volume overload: improved  Mild metabolic acidosis  Macrocytic anemia  Elevated troponin  8. Thrombocytopenia  9. Hx cocaine use    Ok for DC from renal standpoint    Hermila Clayton DO  [facility]    This report has been created using voice recognition software. It may contain minor errors which are inherent in voice recognition technology

## 2025-04-06 NOTE — PROGRESS NOTES
Hospitalist Progress Note    Patient:  Trav Jennings    Unit/Bed:3B-23/023-A  YOB: 1965  MRN: [id number]   Acct: [id number]   PCP: Radha Hou APRN - CNP  Date of Admission: 4/4/2025    ASSESSMENT AND PLAN  Active Problems  Hyperkalemia, with ESRD on HD: Associated with secondary hyperparathyroidism and FSGS. Follows outpt with Dr. Ayon, nephrology. On HD T/TR/Sat. Recently missed two treatments. Nephrology onboard.   K 5.6 today 4/5  Received HD 4/4, 4/5   Resolved 5/6  Elevated troponin: Trop 121 on 4/4, no trend before or after noted. Chronic elevation likely in the setting of demand ischemia from ESRD. Trop trend flat. Pt denies CP at this time. EKG without acute ischemic changes.       Resolved Problems  HTN urgency- in setting of ESRD, with known left WHITNEY, recent cocaine use. Off of nicardipine. Continue  to monitor closely    Chronic Conditions (reviewed and stable unless otherwise stated):   Paroxysmal A. Fib: on Amiodarone, Eliquis. Rate controlled.  HLD: on statin  Hx of TAA/AAA: Hx of dissection s/p aortic stent graft 7/2018  HFpEF: Last ECHO 12/06/2024 EF shows 60-65% with severe concentric hypertrophy, abnormal diastolic dysfunction. On BB, no diuretic or SLGTi.  2 g sodium restriction, 2L fluid restriction. Strict I&Os. Monitor daily weights.   Chronic microcytic anemia:  Secondary to BRISEIDA in setting of ESRD.  Baseline Hgb ~ 8-9. Presented Hgb 7.0. Remote history of AVF hemorrhage. S/p BC-AVF ligation with excision of aneurysmal portion followed by reconstruction of graft with vascular surgery.  No obvious bleeding noted. Previously on Retacrit   REZA: Noncompliant with CPAP.  Chronic hypoxic respiratory failure: On baseline 4 L home O2.  PTSD/MDD: On clonidine. No SSRI/SNRIs. Advise outpatient PCP follow up  History of Polysubstance abuse: endorsing EtOH and cocaine use recently. UDS pending   Etoh 4/4: <  3/17/2025. TECHNIQUE: AP upright view of the chest. FINDINGS: There is a stable appearing stent in the aortic arch and descending thoracic aorta. Mediastinal contours are stable. There is stable enlargement of the cardiac silhouette.  There is underaeration of both lung bases. There is a small pleural effusion versus pleural thickening on the right. Upper lung zones appear well aerated. The pulmonary vascularity is normal.     Stable radiographic appearance of the chest with under aeration of both lung bases and a pleural effusion versus pleural thickening in the right lung base. **This report has been created using voice recognition software. It may contain minor errors which are inherent in voice recognition technology.** Electronically signed by Dr. Jessica Horne    4/4/2025 vs 3/17/2025        Electronically signed by Alyssia Perez MD on 4/6/2025 at 9:18 AM

## 2025-04-08 ENCOUNTER — OFFICE VISIT (OUTPATIENT)
Age: 60
End: 2025-04-08

## 2025-04-08 VITALS
HEIGHT: 75 IN | HEART RATE: 80 BPM | SYSTOLIC BLOOD PRESSURE: 182 MMHG | WEIGHT: 213.6 LBS | DIASTOLIC BLOOD PRESSURE: 84 MMHG | BODY MASS INDEX: 26.56 KG/M2

## 2025-04-08 DIAGNOSIS — T82.590S MECHANICAL COMPLICATION OF ARTERIOVENOUS FISTULA SURGICALLY CREATED, SEQUELA: Primary | ICD-10-CM

## 2025-04-08 PROCEDURE — 99024 POSTOP FOLLOW-UP VISIT: CPT | Performed by: SURGERY

## 2025-04-08 NOTE — PROGRESS NOTES
[facility] [place] [facility]  [facility] VASCULAR SURGERY  [address], [address]  [facility] [address]  Dept: [phone]  Loc: [phone]     Patient: Trav Jennings  : 1965  MRN: [id number]  DOS: 2025            HPI:  Trav Jennings is a 60 y.o. male who returns to the office regarding his left upper extremity brachial cephalic fistula.  Recall that he had a hole in his fistula causing significant hemorrhage.  We were able to salvage a working graft with flixene using the brachial artery as his inflow and the distal cephalic vein as the outflow.  He is dialyzing well through this new graft.    Review of Systems    Vitals:    25 1254   BP: (!) 182/84  Comment: Has not taken medication due to dialysis this morning.   BP Site: Right Upper Arm   Patient Position: Sitting   BP Cuff Size: Medium Adult   Pulse: 80   Weight: 96.9 kg (213 lb 9.6 oz)   Height: 1.905 m (6' 3\")          Physical Exam  On examination he continues to have a large area of collected blood within the fistula that is not infected.  That fistula is not working.  The graft has a palpable thrill.  He has a palpable distal radial pulse.    Assessment:  1. Mechanical complication of arteriovenous fistula surgically created, sequela          Plan:  At this point I am pleased with his overall results and progress.  He has a usable brachioaxillary graft and we will continue to use this until it has issues.  He knows that he is certainly welcome back to the office at any time but we will follow as needed currently.    Electronically signed by:  Tony Garcia MD

## 2025-04-19 NOTE — PROGRESS NOTES
Physician Progress Note      PATIENT:               HERON JAIN  CSN #:                  [id number]  :                       1965  ADMIT DATE:       2025 5:16 AM  DISCH DATE:        2025 1:40 PM  RESPONDING  PROVIDER #:        Alyssia Perez MD          QUERY TEXT:    The attending physician is required to clarify conflicting documentation in   the medical record. Noted documentation of (HTN urgency by Alyssia Perez MD in the Discharge Summary and Hypertensive emergency by Alyssia Perez MD in the Discharge Summary.    The clinical indicators include:  \"Hypertensive emergency\" (Alyssia Gaxiola MD)    -Hypertensive urgency, resolved: In setting of ESRD with known left WHITNEY.   Continue to monitor closely (H&P Kel, Zachery, DO)    -was given hydralazine x1, started on nicardipine gtt and nephrology was   consulted (H&P)    -HTN urgency- in setting of ESRD, with known left WHITNEY, recent cocaine use (Ashlee Hoff PA-C)    -Principal Problem: Hypertensive emergency (Alyssia Gaxiola MD)    -BP: 238/91, 210/105, 230/104, 206/96, 186/81, 176/71, 157/79, 162/76  Options provided:  -- Hypertensive Urgency confirmed and?Hypertensive Emergency ruled out  -- Hypertensive Emergency confirmed as evidenced by, Please provide supporting   documentation.  -- Other - I will add my own diagnosis  -- Disagree - Not applicable / Not valid  -- Disagree - Clinically unable to determine / Unknown  -- Refer to Clinical Documentation Reviewer    PROVIDER RESPONSE TEXT:    After study, Hypertensive Urgency confirmed and?Hypertensive Emergency ruled   out.    Query created by: Salima Holloway on 2025 5:46 PM      Electronically signed by:  Alyssia Perez MD 2025 5:13 PM

## 2025-04-29 ENCOUNTER — APPOINTMENT (OUTPATIENT)
Dept: GENERAL RADIOLOGY | Age: 60
DRG: 640 | End: 2025-04-29
Payer: MEDICARE

## 2025-04-29 ENCOUNTER — HOSPITAL ENCOUNTER (INPATIENT)
Age: 60
LOS: 2 days | Discharge: HOME | DRG: 640 | End: 2025-05-01
Attending: INTERNAL MEDICINE | Admitting: INTERNAL MEDICINE
Payer: MEDICARE

## 2025-04-29 DIAGNOSIS — N18.6 END STAGE RENAL DISEASE (HCC): ICD-10-CM

## 2025-04-29 DIAGNOSIS — R94.31 T WAVE INVERSION IN EKG: ICD-10-CM

## 2025-04-29 DIAGNOSIS — E87.5 HYPERKALEMIA: Primary | ICD-10-CM

## 2025-04-29 LAB
ALBUMIN SERPL BCG-MCNC: 4 G/DL (ref 3.4–4.9)
ALP SERPL-CCNC: 119 U/L (ref 40–129)
ALT SERPL W/O P-5'-P-CCNC: 12 U/L (ref 10–50)
ANION GAP SERPL CALC-SCNC: 20 MEQ/L (ref 8–16)
AST SERPL-CCNC: 19 U/L (ref 10–50)
B PERT DNA NPH QL NAA+PROBE: NOT DETECTED
BASOPHILS ABSOLUTE: 0 THOU/MM3 (ref 0–0.1)
BASOPHILS NFR BLD AUTO: 0.5 %
BILIRUB SERPL-MCNC: 0.3 MG/DL (ref 0.3–1.2)
BORDETELLA PARAPERTUSSIS BY PCR: NOT DETECTED
BUN SERPL-MCNC: 83 MG/DL (ref 8–23)
C PNEUM DNA SPEC QL NAA+PROBE: NOT DETECTED
CALCIUM SERPL-MCNC: 9.3 MG/DL (ref 8.6–10)
CHLORIDE SERPL-SCNC: 97 MEQ/L (ref 98–111)
CO2 SERPL-SCNC: 23 MEQ/L (ref 22–29)
CREAT SERPL-MCNC: 10.9 MG/DL (ref 0.7–1.2)
DEPRECATED RDW RBC AUTO: 56.3 FL (ref 35–45)
EKG ATRIAL RATE: 89 BPM
EKG P AXIS: 49 DEGREES
EKG P-R INTERVAL: 210 MS
EKG Q-T INTERVAL: 430 MS
EKG QRS DURATION: 114 MS
EKG QTC CALCULATION (BAZETT): 523 MS
EKG R AXIS: -45 DEGREES
EKG T AXIS: 84 DEGREES
EKG VENTRICULAR RATE: 89 BPM
EOSINOPHIL NFR BLD AUTO: 1.4 %
EOSINOPHILS ABSOLUTE: 0.1 THOU/MM3 (ref 0–0.4)
ERYTHROCYTE [DISTWIDTH] IN BLOOD BY AUTOMATED COUNT: 15.1 % (ref 11.5–14.5)
FLUAV RNA NPH QL NAA+PROBE: NOT DETECTED
FLUAV RNA RESP QL NAA+PROBE: NOT DETECTED
FLUBV RNA NPH QL NAA+PROBE: NOT DETECTED
FLUBV RNA RESP QL NAA+PROBE: NOT DETECTED
GFR SERPL CREATININE-BSD FRML MDRD: 5 ML/MIN/1.73M2
GLUCOSE SERPL-MCNC: 95 MG/DL (ref 74–109)
HADV DNA NPH QL NAA+PROBE: NOT DETECTED
HCOV 229E RNA SPEC QL NAA+PROBE: NOT DETECTED
HCOV HKU1 RNA SPEC QL NAA+PROBE: NOT DETECTED
HCOV NL63 RNA SPEC QL NAA+PROBE: NOT DETECTED
HCOV OC43 RNA SPEC QL NAA+PROBE: NOT DETECTED
HCT VFR BLD AUTO: 27.9 % (ref 42–52)
HGB BLD-MCNC: 8.8 GM/DL (ref 14–18)
HMPV RNA NPH QL NAA+PROBE: NOT DETECTED
HPIV1 RNA NPH QL NAA+PROBE: NOT DETECTED
HPIV2 RNA NPH QL NAA+PROBE: NOT DETECTED
HPIV3 RNA NPH QL NAA+PROBE: NOT DETECTED
HPIV4 RNA NPH QL NAA+PROBE: NOT DETECTED
IMM GRANULOCYTES # BLD AUTO: 0.04 THOU/MM3 (ref 0–0.07)
IMM GRANULOCYTES NFR BLD AUTO: 0.5 %
LYMPHOCYTES ABSOLUTE: 0.9 THOU/MM3 (ref 1–4.8)
LYMPHOCYTES NFR BLD AUTO: 10.8 %
M PNEUMO DNA SPEC QL NAA+PROBE: NOT DETECTED
MCH RBC QN AUTO: 32.2 PG (ref 26–33)
MCHC RBC AUTO-ENTMCNC: 31.5 GM/DL (ref 32.2–35.5)
MCV RBC AUTO: 102.2 FL (ref 80–94)
MONOCYTES ABSOLUTE: 0.8 THOU/MM3 (ref 0.4–1.3)
MONOCYTES NFR BLD AUTO: 9.1 %
NEUTROPHILS ABSOLUTE: 6.6 THOU/MM3 (ref 1.8–7.7)
NEUTROPHILS NFR BLD AUTO: 77.7 %
NRBC BLD AUTO-RTO: 0 /100 WBC
NT-PROBNP SERPL IA-MCNC: ABNORMAL PG/ML (ref 0–124)
OSMOLALITY SERPL CALC.SUM OF ELEC: 304.3 MOSMOL/KG (ref 275–300)
PLATELET # BLD AUTO: 223 THOU/MM3 (ref 130–400)
PMV BLD AUTO: 9.9 FL (ref 9.4–12.4)
POTASSIUM SERPL-SCNC: 6.1 MEQ/L (ref 3.5–5.2)
PROT SERPL-MCNC: 8.2 G/DL (ref 6.4–8.3)
RBC # BLD AUTO: 2.73 MILL/MM3 (ref 4.7–6.1)
RSV RNA NPH QL NAA+PROBE: NOT DETECTED
RV+EV RNA SPEC QL NAA+PROBE: NOT DETECTED
SARS-COV-2 RNA NPH QL NAA+NON-PROBE: NOT DETECTED
SARS-COV-2 RNA RESP QL NAA+PROBE: NOT DETECTED
SODIUM SERPL-SCNC: 140 MEQ/L (ref 135–145)
TROPONIN, HIGH SENSITIVITY: 131 NG/L (ref 0–12)
TROPONIN, HIGH SENSITIVITY: 136 NG/L (ref 0–12)
WBC # BLD AUTO: 8.5 THOU/MM3 (ref 4.8–10.8)

## 2025-04-29 PROCEDURE — 6360000002 HC RX W HCPCS

## 2025-04-29 PROCEDURE — 85025 COMPLETE CBC W/AUTO DIFF WBC: CPT

## 2025-04-29 PROCEDURE — 90935 HEMODIALYSIS ONE EVALUATION: CPT

## 2025-04-29 PROCEDURE — 71046 X-RAY EXAM CHEST 2 VIEWS: CPT

## 2025-04-29 PROCEDURE — 87636 SARSCOV2 & INF A&B AMP PRB: CPT

## 2025-04-29 PROCEDURE — 0202U NFCT DS 22 TRGT SARS-COV-2: CPT

## 2025-04-29 PROCEDURE — 2500000003 HC RX 250 WO HCPCS: Performed by: INTERNAL MEDICINE

## 2025-04-29 PROCEDURE — 96374 THER/PROPH/DIAG INJ IV PUSH: CPT

## 2025-04-29 PROCEDURE — 6370000000 HC RX 637 (ALT 250 FOR IP): Performed by: INTERNAL MEDICINE

## 2025-04-29 PROCEDURE — 2580000003 HC RX 258

## 2025-04-29 PROCEDURE — 99285 EMERGENCY DEPT VISIT HI MDM: CPT

## 2025-04-29 PROCEDURE — 99222 1ST HOSP IP/OBS MODERATE 55: CPT | Performed by: INTERNAL MEDICINE

## 2025-04-29 PROCEDURE — 93005 ELECTROCARDIOGRAM TRACING: CPT | Performed by: INTERNAL MEDICINE

## 2025-04-29 PROCEDURE — 2060000000 HC ICU INTERMEDIATE R&B

## 2025-04-29 PROCEDURE — 83880 ASSAY OF NATRIURETIC PEPTIDE: CPT

## 2025-04-29 PROCEDURE — 94761 N-INVAS EAR/PLS OXIMETRY MLT: CPT

## 2025-04-29 PROCEDURE — 80053 COMPREHEN METABOLIC PANEL: CPT

## 2025-04-29 PROCEDURE — 94640 AIRWAY INHALATION TREATMENT: CPT

## 2025-04-29 PROCEDURE — 84484 ASSAY OF TROPONIN QUANT: CPT

## 2025-04-29 PROCEDURE — 87081 CULTURE SCREEN ONLY: CPT

## 2025-04-29 PROCEDURE — 93010 ELECTROCARDIOGRAM REPORT: CPT | Performed by: NUCLEAR MEDICINE

## 2025-04-29 PROCEDURE — 5A1D70Z PERFORMANCE OF URINARY FILTRATION, INTERMITTENT, LESS THAN 6 HOURS PER DAY: ICD-10-PCS | Performed by: INTERNAL MEDICINE

## 2025-04-29 PROCEDURE — 2700000000 HC OXYGEN THERAPY PER DAY

## 2025-04-29 PROCEDURE — 36415 COLL VENOUS BLD VENIPUNCTURE: CPT

## 2025-04-29 RX ORDER — SODIUM CHLORIDE 0.9 % (FLUSH) 0.9 %
10 SYRINGE (ML) INJECTION PRN
Status: DISCONTINUED | OUTPATIENT
Start: 2025-04-29 | End: 2025-05-01 | Stop reason: HOSPADM

## 2025-04-29 RX ORDER — ONDANSETRON 2 MG/ML
4 INJECTION INTRAMUSCULAR; INTRAVENOUS EVERY 6 HOURS PRN
Status: DISCONTINUED | OUTPATIENT
Start: 2025-04-29 | End: 2025-05-01 | Stop reason: HOSPADM

## 2025-04-29 RX ORDER — POLYETHYLENE GLYCOL 3350 17 G/17G
17 POWDER, FOR SOLUTION ORAL DAILY PRN
Status: DISCONTINUED | OUTPATIENT
Start: 2025-04-29 | End: 2025-05-01 | Stop reason: HOSPADM

## 2025-04-29 RX ORDER — ATORVASTATIN CALCIUM 40 MG/1
40 TABLET, FILM COATED ORAL DAILY
Status: DISCONTINUED | OUTPATIENT
Start: 2025-04-29 | End: 2025-05-01 | Stop reason: HOSPADM

## 2025-04-29 RX ORDER — MORPHINE SULFATE 2 MG/ML
1 INJECTION, SOLUTION INTRAMUSCULAR; INTRAVENOUS EVERY 4 HOURS PRN
Status: DISCONTINUED | OUTPATIENT
Start: 2025-04-29 | End: 2025-05-01 | Stop reason: HOSPADM

## 2025-04-29 RX ORDER — SODIUM CHLORIDE 0.9 % (FLUSH) 0.9 %
5-40 SYRINGE (ML) INJECTION EVERY 12 HOURS SCHEDULED
Status: DISCONTINUED | OUTPATIENT
Start: 2025-04-29 | End: 2025-05-01 | Stop reason: HOSPADM

## 2025-04-29 RX ORDER — ALBUTEROL SULFATE 0.83 MG/ML
2.5 SOLUTION RESPIRATORY (INHALATION) ONCE
Status: COMPLETED | OUTPATIENT
Start: 2025-04-29 | End: 2025-04-29

## 2025-04-29 RX ORDER — DIAZEPAM 5 MG/1
5 TABLET ORAL EVERY 6 HOURS PRN
Status: DISCONTINUED | OUTPATIENT
Start: 2025-04-29 | End: 2025-05-01 | Stop reason: HOSPADM

## 2025-04-29 RX ORDER — SODIUM CHLORIDE 9 MG/ML
INJECTION, SOLUTION INTRAVENOUS PRN
Status: DISCONTINUED | OUTPATIENT
Start: 2025-04-29 | End: 2025-05-01 | Stop reason: HOSPADM

## 2025-04-29 RX ORDER — CALCIUM GLUCONATE 94 MG/ML
1000 INJECTION, SOLUTION INTRAVENOUS ONCE
Status: COMPLETED | OUTPATIENT
Start: 2025-04-29 | End: 2025-04-29

## 2025-04-29 RX ORDER — HYDRALAZINE HYDROCHLORIDE 50 MG/1
100 TABLET, FILM COATED ORAL EVERY 8 HOURS
Status: DISCONTINUED | OUTPATIENT
Start: 2025-04-29 | End: 2025-05-01 | Stop reason: HOSPADM

## 2025-04-29 RX ORDER — AMIODARONE HYDROCHLORIDE 200 MG/1
200 TABLET ORAL DAILY
Status: DISCONTINUED | OUTPATIENT
Start: 2025-04-29 | End: 2025-05-01 | Stop reason: HOSPADM

## 2025-04-29 RX ORDER — IPRATROPIUM BROMIDE AND ALBUTEROL SULFATE 2.5; .5 MG/3ML; MG/3ML
1 SOLUTION RESPIRATORY (INHALATION)
Status: DISCONTINUED | OUTPATIENT
Start: 2025-04-29 | End: 2025-04-30

## 2025-04-29 RX ORDER — LABETALOL 100 MG/1
100 TABLET, FILM COATED ORAL EVERY 8 HOURS SCHEDULED
Status: DISCONTINUED | OUTPATIENT
Start: 2025-04-29 | End: 2025-05-01 | Stop reason: HOSPADM

## 2025-04-29 RX ORDER — ACETAMINOPHEN 650 MG/1
650 SUPPOSITORY RECTAL EVERY 6 HOURS PRN
Status: DISCONTINUED | OUTPATIENT
Start: 2025-04-29 | End: 2025-05-01 | Stop reason: HOSPADM

## 2025-04-29 RX ORDER — ISOSORBIDE MONONITRATE 60 MG/1
120 TABLET, EXTENDED RELEASE ORAL 2 TIMES DAILY
Status: DISCONTINUED | OUTPATIENT
Start: 2025-04-29 | End: 2025-05-01 | Stop reason: HOSPADM

## 2025-04-29 RX ORDER — HYDRALAZINE HYDROCHLORIDE 20 MG/ML
5 INJECTION INTRAMUSCULAR; INTRAVENOUS EVERY 6 HOURS PRN
Status: DISCONTINUED | OUTPATIENT
Start: 2025-04-29 | End: 2025-05-01 | Stop reason: HOSPADM

## 2025-04-29 RX ORDER — SODIUM CHLORIDE, SODIUM LACTATE, POTASSIUM CHLORIDE, AND CALCIUM CHLORIDE .6; .31; .03; .02 G/100ML; G/100ML; G/100ML; G/100ML
250 INJECTION, SOLUTION INTRAVENOUS ONCE
Status: COMPLETED | OUTPATIENT
Start: 2025-04-29 | End: 2025-04-29

## 2025-04-29 RX ORDER — PREDNISONE 20 MG/1
20 TABLET ORAL DAILY
Status: DISCONTINUED | OUTPATIENT
Start: 2025-04-29 | End: 2025-05-01 | Stop reason: HOSPADM

## 2025-04-29 RX ORDER — ASPIRIN 81 MG/1
81 TABLET ORAL DAILY
Status: DISCONTINUED | OUTPATIENT
Start: 2025-04-29 | End: 2025-05-01 | Stop reason: HOSPADM

## 2025-04-29 RX ORDER — AMLODIPINE BESYLATE 5 MG/1
5 TABLET ORAL DAILY
Status: DISCONTINUED | OUTPATIENT
Start: 2025-04-29 | End: 2025-05-01 | Stop reason: HOSPADM

## 2025-04-29 RX ORDER — ALBUTEROL SULFATE 0.83 MG/ML
2.5 SOLUTION RESPIRATORY (INHALATION) EVERY 4 HOURS PRN
Status: DISCONTINUED | OUTPATIENT
Start: 2025-04-29 | End: 2025-05-01 | Stop reason: HOSPADM

## 2025-04-29 RX ORDER — HYDROCODONE BITARTRATE AND ACETAMINOPHEN 5; 325 MG/1; MG/1
1 TABLET ORAL EVERY 6 HOURS PRN
Status: DISCONTINUED | OUTPATIENT
Start: 2025-04-29 | End: 2025-05-01 | Stop reason: HOSPADM

## 2025-04-29 RX ORDER — ONDANSETRON 4 MG/1
4 TABLET, ORALLY DISINTEGRATING ORAL EVERY 8 HOURS PRN
Status: DISCONTINUED | OUTPATIENT
Start: 2025-04-29 | End: 2025-05-01 | Stop reason: HOSPADM

## 2025-04-29 RX ORDER — ACETAMINOPHEN 325 MG/1
650 TABLET ORAL EVERY 6 HOURS PRN
Status: DISCONTINUED | OUTPATIENT
Start: 2025-04-29 | End: 2025-05-01 | Stop reason: HOSPADM

## 2025-04-29 RX ADMIN — ATORVASTATIN CALCIUM 40 MG: 40 TABLET, FILM COATED ORAL at 17:57

## 2025-04-29 RX ADMIN — CLONIDINE HYDROCHLORIDE 0.3 MG: 0.1 TABLET ORAL at 20:15

## 2025-04-29 RX ADMIN — ISOSORBIDE MONONITRATE 120 MG: 60 TABLET, EXTENDED RELEASE ORAL at 20:15

## 2025-04-29 RX ADMIN — DIAZEPAM 5 MG: 5 TABLET ORAL at 20:23

## 2025-04-29 RX ADMIN — AMIODARONE HYDROCHLORIDE 200 MG: 200 TABLET ORAL at 17:57

## 2025-04-29 RX ADMIN — APIXABAN 2.5 MG: 2.5 TABLET, FILM COATED ORAL at 20:15

## 2025-04-29 RX ADMIN — CALCIUM GLUCONATE 1000 MG: 98 INJECTION, SOLUTION INTRAVENOUS at 07:46

## 2025-04-29 RX ADMIN — LABETALOL HYDROCHLORIDE 100 MG: 100 TABLET, FILM COATED ORAL at 20:15

## 2025-04-29 RX ADMIN — IPRATROPIUM BROMIDE AND ALBUTEROL SULFATE 1 DOSE: .5; 3 SOLUTION RESPIRATORY (INHALATION) at 21:01

## 2025-04-29 RX ADMIN — AMLODIPINE BESYLATE 5 MG: 5 TABLET ORAL at 17:57

## 2025-04-29 RX ADMIN — HYDRALAZINE HYDROCHLORIDE 100 MG: 50 TABLET ORAL at 17:57

## 2025-04-29 RX ADMIN — SODIUM CHLORIDE, PRESERVATIVE FREE 10 ML: 5 INJECTION INTRAVENOUS at 20:15

## 2025-04-29 RX ADMIN — ALBUTEROL SULFATE 2.5 MG: 2.5 SOLUTION RESPIRATORY (INHALATION) at 08:04

## 2025-04-29 RX ADMIN — PREDNISONE 20 MG: 20 TABLET ORAL at 17:57

## 2025-04-29 RX ADMIN — SODIUM CHLORIDE, SODIUM LACTATE, POTASSIUM CHLORIDE, AND CALCIUM CHLORIDE 250 ML: .6; .31; .03; .02 INJECTION, SOLUTION INTRAVENOUS at 09:51

## 2025-04-29 RX ADMIN — ASPIRIN 81 MG: 81 TABLET, COATED ORAL at 17:57

## 2025-04-29 ASSESSMENT — PAIN DESCRIPTION - PAIN TYPE
TYPE: CHRONIC PAIN
TYPE: CHRONIC PAIN

## 2025-04-29 ASSESSMENT — PAIN - FUNCTIONAL ASSESSMENT
PAIN_FUNCTIONAL_ASSESSMENT: ACTIVITIES ARE NOT PREVENTED
PAIN_FUNCTIONAL_ASSESSMENT: ACTIVITIES ARE NOT PREVENTED
PAIN_FUNCTIONAL_ASSESSMENT: NONE - DENIES PAIN

## 2025-04-29 ASSESSMENT — PAIN DESCRIPTION - DESCRIPTORS
DESCRIPTORS: ACHING
DESCRIPTORS: ACHING

## 2025-04-29 ASSESSMENT — PAIN DESCRIPTION - FREQUENCY: FREQUENCY: CONTINUOUS

## 2025-04-29 ASSESSMENT — PAIN DESCRIPTION - ONSET: ONSET: ON-GOING

## 2025-04-29 ASSESSMENT — PAIN DESCRIPTION - LOCATION
LOCATION: GENERALIZED
LOCATION: GENERALIZED

## 2025-04-29 ASSESSMENT — PAIN SCALES - GENERAL
PAINLEVEL_OUTOF10: 7
PAINLEVEL_OUTOF10: 6

## 2025-04-29 NOTE — CONSULTS
[facility]    [address], [place] [address]  [phone]     Hospital Consult  4/29/2025 1:00 PM    Pt Name:    Trav Jennings  MRN:     [id number]   [id number]  YOB: 1965  Admit Date:    4/29/2025  5:54 AM  Primary Care Physician:  Radha Hou APRN - CNP    CSN Number:   [id number]    Reason for Consult:  ESRD, hyperkalemia  Requesting provider:  Hospitalist    History:   The patient is a 60 y.o. -American male with history of ESRD on hemodialysis under Dr. Ayon, hypertension, history of substance use who presented after he noticed some oozing around his fistula site.  Patient has history of substance use.  He says he did not feel right.  Reports blood pressure was high.  Also had some shortness of breath.  Bleeding from fistula site stopped spontaneously with Steri-Strips.  Nephrology consulted for management of ESRD and hyperkalemia.  Potassium noted to be 6.1.  Patient seen and examined on dialysis.  Denies any vomiting or diarrhea.  No fever or chills.    Past Medical History:  Past Medical History:   Diagnosis Date    AAA (abdominal aortic aneurysm)     NURIS (acute kidney injury) 9/24/2015    Anemia associated with chronic renal failure     Arthritis     stated in hands    Cocaine abuse (HCC) 5/10/2014    Depression     Diabetes mellitus (HCC)     pt states he no longer has diabetes he has lost alot of davion.     FSGS (focal segmental glomerulosclerosis) 5/23/2013    Hemodialysis patient 10/17/2016    on hemodialysis with [facility]    Hemorrhoids 1/16/2012    History of blood transfusion     Hyperlipidemia     Hyperphosphatemia 5/21/2016    Hypertension     Left renal artery stenosis 5/22/2014    Monoclonal (M) protein disease, multiple 'M' protein     Nicotine dependence 6/16/2014    Noncompliance     Pneumonia     Psychiatric problem     PTSD (post-traumatic stress disorder)     Pt vet from [facility]    Secondary

## 2025-04-29 NOTE — ED TRIAGE NOTES
Pt presents to the ED via walk-in from home with c/o Fistula problem in left brachial, pt states around yesterday evening pt noticed Fistula started bleeding consistently, pt's family member placed strips/tape on fistula to stop bleeding but pt states bleeding has persisted. When pt asked what made him come in at this time pt states \"because I was drinking and smoking and I don't usually do that.\" Pt states he had about 6 24oz and that is significant amount more than usual and pt snort crack on and off for 3 hours. When pt asked if he felt fatigue or weak pt states \"I just don't feel right\". Pt hypertensive and states he has been taking all home medications as prescribed. Pt denies having pain at this time. Pt states size of fistula is normal baseline size. Pt gets dialysis Tues, Thurs, and Saturday and pt got dialysis last on Saturday.

## 2025-04-29 NOTE — PLAN OF CARE
Problem: Chronic Conditions and Co-morbidities  Goal: Patient's chronic conditions and co-morbidity symptoms are monitored and maintained or improved  Outcome: Progressing     Problem: Discharge Planning  Goal: Discharge to home or other facility with appropriate resources  Outcome: Progressing     Problem: Safety - Adult  Goal: Free from fall injury  Outcome: Progressing     Problem: ABCDS Injury Assessment  Goal: Absence of physical injury  Outcome: Progressing  Flowsheets (Taken 4/29/2025 2180)  Absence of Physical Injury: Implement safety measures based on patient assessment

## 2025-04-29 NOTE — ED NOTES
Report from GARLAND Machuca. Xray tech at bedside to take patient for imaging. Pt in stable condition. RR even and unlabored.

## 2025-04-29 NOTE — ED NOTES
Pt states that for the past 4-5 days his eyes have been watering and he feels congested. Swab for flu/covid obtained and sent.

## 2025-04-29 NOTE — CONSULTS
Patient seen on dialysis  Arrangements made for urgent dialysis secondary to hyperkalemia  Seen on dialysis and tolerating dialysis treatment well ultrafiltration 3.4 kg  Blood pressure 196/94  Heart rate of 82  Blood flow rate of 500  Full consult note to follow

## 2025-04-29 NOTE — ED PROVIDER NOTES
[facility]      EMERGENCY MEDICINE     Pt Name: Trav Jennings  MRN: [id number]  Birthdate 1965  Date of evaluation: 4/29/2025  Provider: Carlie Richard PA-C    CHIEF COMPLAINT     No chief complaint on file.    HISTORY OF PRESENT ILLNESS   Trav Jennings is a pleasant 60 y.o. male who presents to the emergency department from from home, as a walk in to the ED lobby for evaluation of bleeding from previous fistula site.  Patient states he noticed small amount of bleeding from previously used fistula site. Patient also endorsing vague complaints of \"not feeling right\", endorsing crack and alcohol use yesterday. Patient with chest tightness PTA, unable to specify alleviating/aggravating factors.  Patient due for dialysis today. Patient denies lightheadedness, fever, leg swelling, vision change, weakness.     PASTMEDICAL HISTORY     Past Medical History:   Diagnosis Date    AAA (abdominal aortic aneurysm)     NURIS (acute kidney injury) 9/24/2015    Anemia associated with chronic renal failure     Arthritis     stated in hands    Cocaine abuse ([facility]) 5/10/2014    Depression     Diabetes mellitus ([facility])     pt states he no longer has diabetes he has lost alot of davion.     FSGS (focal segmental glomerulosclerosis) 5/23/2013    Hemodialysis patient 10/17/2016    on hemodialysis with [facility]    Hemorrhoids 1/16/2012    History of blood transfusion     Hyperlipidemia     Hyperphosphatemia 5/21/2016    Hypertension     Left renal artery stenosis 5/22/2014    Monoclonal (M) protein disease, multiple 'M' protein     Nicotine dependence 6/16/2014    Noncompliance     Pneumonia     Psychiatric problem     PTSD (post-traumatic stress disorder)     Pt vet from [facility]    Secondary hyperparathyroidism (of renal origin)     Sleep apnea        Patient Active Problem List   Diagnosis Code    FSGS (focal segmental glomerulosclerosis) N05.1    Diabetes mellitus type 2, controlled ([facility]) E11.9

## 2025-04-29 NOTE — H&P
History & Physical    Patient:  Trav Jennings  YOB: 1965  Date of Service: 4/29/2025  MRN: [id number]   Acct:  [id number]   Primary Care Physician: Radha Hou APRN - CNP    Chief Complaint: Fistula bleed    History of Present Illness:   History obtained from the patient.  And chart review    The patient is a 60 y.o. male who presents with patient states he presented to the ER since he has been having intermittent chest tightness for 3 to 4 days he is concerned he may have an upper respiratory viral tract infection he also states he drank 1 beer last evening and did not take his blood pressure medicines, on review of his nursing charting on arrival to ER today it appears he stated to staff that he actually used crack cocaine and consuming large volumes of alcohol, he denies chronic alcohol use to me therefore I am uncertain as whether or not he has chronic alcohol use and may experience withdrawals in his hospitalization but in any event he will place an oral diazepam as a precaution he has not taken his blood pressure medicines and is in hypertensive urgency he appears to undergo dialysis Tuesday Thursday Saturday and he has resolved spontaneous bleed from the left fistula in the midportion for which it has been controlled with Steri-Strips, he also complains of mild chest pressure that is central in origin his blood pressures on my physical survey show systolics over 200 at this time I will see if it is possible to contact nephrology for dialysis I will resume his antihypertensive medications including dihydropyridine calcium channel blocker and labetalol in the meantime I will also resume oral hydralazine in addition to using IV hydralazine to bring down his blood pressures closer to 160 mmHg    Pertinent chart review indicates patient was discharged from hospital recently for 4/6/2025 for similar reason      Past Medical History:        Diagnosis Date    AAA (abdominal aortic aneurysm)

## 2025-04-29 NOTE — ED NOTES
Dr. Roque at bedside and verbalizes that bleeding appears to have clotted off and stopped at this time.

## 2025-04-29 NOTE — ED NOTES
Pt in bed with call light in reach, pt provided toothbrush, mouthwash, deoderant, washcloth per request. Pt ambulates from wheelchair to get into bed and SPO2 on 4L 87% pt placed on 5L NC at this time. Pt is breathing regular and unlabored on 5L NC at this time. Pt is hypertensive other vitals stable, pt states \"I just feel like shit right now\".

## 2025-04-29 NOTE — FLOWSHEET NOTE
Stable 4 hour treatment complete. Removed 3 liters of fluid. Tolerated well. Pressure held to needle sites for ten minutes each. Dressing clean, dry and intact upon leaving unit. Report given to primary RN. Treatment record printed to be scanned into EMR.   04/29/25 1050 04/29/25 1517   Vital Signs   BP (!) 215/107 (!) 188/96   Temp 98.3 °F (36.8 °C) 98.3 °F (36.8 °C)   Pulse 86 84   Respirations 19 16   SpO2 97 % 100 %   Weight - Scale 96.1 kg (211 lb 13.8 oz) 93.1 kg (205 lb 4 oz)   Weight Method Standing scale Standing scale   Percent Weight Change -0.53 -3.12   Post-Hemodialysis Assessment   Post-Treatment Procedures  --  Blood returned;Access bleeding time < 10 minutes   Machine Disinfection Process  --  Acid/Vinegar Clean;Heat Disinfect;Exterior Machine Disinfection   Blood Volume Processed (Liters)  --  108.4 L   Dialyzer Clearance  --  Lightly streaked   Duration of Treatment (minutes)  --  240 minutes   Heparin Amount Administered During Treatment (mL)  --  0 mL   Hemodialysis Intake (ml)  --  400 ml   Hemodialysis Output (ml)  --  3400 ml   NET Removed (ml)  --  3000   Tolerated Treatment  --  Good

## 2025-04-30 LAB
ALBUMIN SERPL BCG-MCNC: 3.4 G/DL (ref 3.4–4.9)
ALP SERPL-CCNC: 106 U/L (ref 40–129)
ALT SERPL W/O P-5'-P-CCNC: 11 U/L (ref 10–50)
ANION GAP SERPL CALC-SCNC: 12 MEQ/L (ref 8–16)
AST SERPL-CCNC: 14 U/L (ref 10–50)
BASOPHILS ABSOLUTE: 0 THOU/MM3 (ref 0–0.1)
BASOPHILS NFR BLD AUTO: 0.4 %
BILIRUB SERPL-MCNC: 0.2 MG/DL (ref 0.3–1.2)
BUN SERPL-MCNC: 47 MG/DL (ref 8–23)
CALCIUM SERPL-MCNC: 8.3 MG/DL (ref 8.6–10)
CHLORIDE SERPL-SCNC: 104 MEQ/L (ref 98–111)
CO2 SERPL-SCNC: 25 MEQ/L (ref 22–29)
CREAT SERPL-MCNC: 7 MG/DL (ref 0.7–1.2)
DEPRECATED RDW RBC AUTO: 56.3 FL (ref 35–45)
EOSINOPHIL NFR BLD AUTO: 0.4 %
EOSINOPHILS ABSOLUTE: 0 THOU/MM3 (ref 0–0.4)
ERYTHROCYTE [DISTWIDTH] IN BLOOD BY AUTOMATED COUNT: 14.8 % (ref 11.5–14.5)
GFR SERPL CREATININE-BSD FRML MDRD: 8 ML/MIN/1.73M2
GLUCOSE SERPL-MCNC: 201 MG/DL (ref 74–109)
HCT VFR BLD AUTO: 27.7 % (ref 42–52)
HGB BLD-MCNC: 8.3 GM/DL (ref 14–18)
IMM GRANULOCYTES # BLD AUTO: 0.02 THOU/MM3 (ref 0–0.07)
IMM GRANULOCYTES NFR BLD AUTO: 0.4 %
LYMPHOCYTES ABSOLUTE: 0.5 THOU/MM3 (ref 1–4.8)
LYMPHOCYTES NFR BLD AUTO: 10.4 %
MCH RBC QN AUTO: 31 PG (ref 26–33)
MCHC RBC AUTO-ENTMCNC: 30 GM/DL (ref 32.2–35.5)
MCV RBC AUTO: 103.4 FL (ref 80–94)
MONOCYTES ABSOLUTE: 0.5 THOU/MM3 (ref 0.4–1.3)
MONOCYTES NFR BLD AUTO: 8.9 %
NEUTROPHILS ABSOLUTE: 4.1 THOU/MM3 (ref 1.8–7.7)
NEUTROPHILS NFR BLD AUTO: 79.5 %
NRBC BLD AUTO-RTO: 0 /100 WBC
PLATELET # BLD AUTO: 190 THOU/MM3 (ref 130–400)
PMV BLD AUTO: 9.7 FL (ref 9.4–12.4)
POTASSIUM SERPL-SCNC: 4.2 MEQ/L (ref 3.5–5.2)
POTASSIUM SERPL-SCNC: 5.8 MEQ/L (ref 3.5–5.2)
PROT SERPL-MCNC: 7.1 G/DL (ref 6.4–8.3)
RBC # BLD AUTO: 2.68 MILL/MM3 (ref 4.7–6.1)
SODIUM SERPL-SCNC: 141 MEQ/L (ref 135–145)
TROPONIN, HIGH SENSITIVITY: 128 NG/L (ref 0–12)
WBC # BLD AUTO: 5.1 THOU/MM3 (ref 4.8–10.8)

## 2025-04-30 PROCEDURE — 2500000003 HC RX 250 WO HCPCS: Performed by: INTERNAL MEDICINE

## 2025-04-30 PROCEDURE — 6370000000 HC RX 637 (ALT 250 FOR IP): Performed by: INTERNAL MEDICINE

## 2025-04-30 PROCEDURE — 85025 COMPLETE CBC W/AUTO DIFF WBC: CPT

## 2025-04-30 PROCEDURE — 2700000000 HC OXYGEN THERAPY PER DAY

## 2025-04-30 PROCEDURE — 36415 COLL VENOUS BLD VENIPUNCTURE: CPT

## 2025-04-30 PROCEDURE — 80053 COMPREHEN METABOLIC PANEL: CPT

## 2025-04-30 PROCEDURE — 6360000002 HC RX W HCPCS: Performed by: INTERNAL MEDICINE

## 2025-04-30 PROCEDURE — 2060000000 HC ICU INTERMEDIATE R&B

## 2025-04-30 PROCEDURE — 84484 ASSAY OF TROPONIN QUANT: CPT

## 2025-04-30 PROCEDURE — 99232 SBSQ HOSP IP/OBS MODERATE 35: CPT | Performed by: INTERNAL MEDICINE

## 2025-04-30 PROCEDURE — 94640 AIRWAY INHALATION TREATMENT: CPT

## 2025-04-30 PROCEDURE — 84132 ASSAY OF SERUM POTASSIUM: CPT

## 2025-04-30 PROCEDURE — 94760 N-INVAS EAR/PLS OXIMETRY 1: CPT

## 2025-04-30 RX ORDER — IPRATROPIUM BROMIDE AND ALBUTEROL SULFATE 2.5; .5 MG/3ML; MG/3ML
1 SOLUTION RESPIRATORY (INHALATION)
Status: DISCONTINUED | OUTPATIENT
Start: 2025-04-30 | End: 2025-05-01 | Stop reason: HOSPADM

## 2025-04-30 RX ADMIN — ATORVASTATIN CALCIUM 40 MG: 40 TABLET, FILM COATED ORAL at 09:13

## 2025-04-30 RX ADMIN — ISOSORBIDE MONONITRATE 120 MG: 60 TABLET, EXTENDED RELEASE ORAL at 21:07

## 2025-04-30 RX ADMIN — HYDRALAZINE HYDROCHLORIDE 100 MG: 50 TABLET ORAL at 17:17

## 2025-04-30 RX ADMIN — APIXABAN 2.5 MG: 2.5 TABLET, FILM COATED ORAL at 09:13

## 2025-04-30 RX ADMIN — APIXABAN 2.5 MG: 2.5 TABLET, FILM COATED ORAL at 21:07

## 2025-04-30 RX ADMIN — EPOETIN ALFA-EPBX 6000 UNITS: 2000 INJECTION, SOLUTION INTRAVENOUS; SUBCUTANEOUS at 17:19

## 2025-04-30 RX ADMIN — LABETALOL HYDROCHLORIDE 100 MG: 100 TABLET, FILM COATED ORAL at 21:07

## 2025-04-30 RX ADMIN — CLONIDINE HYDROCHLORIDE 0.3 MG: 0.1 TABLET ORAL at 21:07

## 2025-04-30 RX ADMIN — CLONIDINE HYDROCHLORIDE 0.3 MG: 0.1 TABLET ORAL at 09:13

## 2025-04-30 RX ADMIN — HYDRALAZINE HYDROCHLORIDE 100 MG: 50 TABLET ORAL at 09:13

## 2025-04-30 RX ADMIN — ASPIRIN 81 MG: 81 TABLET, COATED ORAL at 09:13

## 2025-04-30 RX ADMIN — SODIUM CHLORIDE, PRESERVATIVE FREE 10 ML: 5 INJECTION INTRAVENOUS at 08:00

## 2025-04-30 RX ADMIN — AMIODARONE HYDROCHLORIDE 200 MG: 200 TABLET ORAL at 09:13

## 2025-04-30 RX ADMIN — PREDNISONE 20 MG: 20 TABLET ORAL at 09:12

## 2025-04-30 RX ADMIN — ISOSORBIDE MONONITRATE 120 MG: 60 TABLET, EXTENDED RELEASE ORAL at 09:12

## 2025-04-30 RX ADMIN — IPRATROPIUM BROMIDE AND ALBUTEROL SULFATE 1 DOSE: .5; 3 SOLUTION RESPIRATORY (INHALATION) at 20:39

## 2025-04-30 RX ADMIN — SODIUM ZIRCONIUM CYCLOSILICATE 10 G: 10 POWDER, FOR SUSPENSION ORAL at 21:07

## 2025-04-30 RX ADMIN — AMLODIPINE BESYLATE 5 MG: 5 TABLET ORAL at 09:13

## 2025-04-30 RX ADMIN — SODIUM ZIRCONIUM CYCLOSILICATE 10 G: 10 POWDER, FOR SUSPENSION ORAL at 10:54

## 2025-04-30 ASSESSMENT — PAIN SCALES - GENERAL
PAINLEVEL_OUTOF10: 0

## 2025-04-30 NOTE — PLAN OF CARE
Problem: Chronic Conditions and Co-morbidities  Goal: Patient's chronic conditions and co-morbidity symptoms are monitored and maintained or improved  4/29/2025 2128 by Mahendra Vaca RN  Outcome: Progressing  Flowsheets (Taken 4/29/2025 2000)  Care Plan - Patient's Chronic Conditions and Co-Morbidity Symptoms are Monitored and Maintained or Improved:   Monitor and assess patient's chronic conditions and comorbid symptoms for stability, deterioration, or improvement   Collaborate with multidisciplinary team to address chronic and comorbid conditions and prevent exacerbation or deterioration   Update acute care plan with appropriate goals if chronic or comorbid symptoms are exacerbated and prevent overall improvement and discharge  4/29/2025 1702 by Reshma Ramirez RN  Outcome: Progressing     Problem: Discharge Planning  Goal: Discharge to home or other facility with appropriate resources  4/29/2025 2128 by Mahendra Vaca RN  Outcome: Progressing  Flowsheets (Taken 4/29/2025 2000)  Discharge to home or other facility with appropriate resources:   Identify barriers to discharge with patient and caregiver   Arrange for needed discharge resources and transportation as appropriate   Identify discharge learning needs (meds, wound care, etc)   Refer to discharge planning if patient needs post-hospital services based on physician order or complex needs related to functional status, cognitive ability or social support system  4/29/2025 1702 by Reshma Ramirez RN  Outcome: Progressing     Problem: Pain  Goal: Verbalizes/displays adequate comfort level or baseline comfort level  4/29/2025 2128 by Mahendra Vaca RN  Outcome: Progressing  Flowsheets (Taken 4/29/2025 2000)  Verbalizes/displays adequate comfort level or baseline comfort level:   Encourage patient to monitor pain and request assistance   Assess pain using appropriate pain scale   Administer analgesics based on type and severity of pain and evaluate

## 2025-04-30 NOTE — CARE COORDINATION
04/30/25 1236   Readmission Assessment   Number of Days since last admission? 8-30 days   Previous Disposition Home with Family   Who is being Interviewed Patient   What was the patient's/caregiver's perception as to why they think they needed to return back to the hospital? Other (Comment)  (Blood pressure was high)   Did you visit your Primary Care Physician after you left the hospital, before you returned this time? Yes   Did you see a specialist, such as Cardiac, Pulmonary, Orthopedic Physician, etc. after you left the hospital? Yes   Who advised the patient to return to the hospital? Self-referral   Does the patient report anything that got in the way of taking their medications? No   In our efforts to provide the best possible care to you and others like you, can you think of anything that we could have done to help you after you left the hospital the first time, so that you might not have needed to return so soon? Other (Comment)  (No)

## 2025-04-30 NOTE — CARE COORDINATION
Case Management Assessment Initial Evaluation    Date/Time of Evaluation: 2025 8:29 AM  Assessment Completed by: Stephanie Marroquin RN    If patient is discharged prior to next notation, then this note serves as note for discharge by case management.    Patient Name: Trav Jain                   YOB: 1965  Diagnosis: Hypertensive urgency [I16.0]                   Date / Time: 2025  5:54 AM  [facility]     Patient Admission Status: Inpatient   Readmission Risk Low 0-14, Mod 15-19), High > 20: Readmission Risk Score: 35.8    Current PCP: Radha Hou, WADE - CNP  Health Care Decision Makers:   Primary Decision Maker: UZIEL JAIN - Spouse - [phone]    Additional Case Management Notes: Presented to ED with c/o intermittent chest tightness for 3-4 days. Blood noted around fistula; steri strips applied and stopped bleeding. Hypertensive; admitted he had used crack cocaine and consumed large volumes of alcohol and didn't take his BP meds. Admitted to . Nephrology consulted. HD yesterday with 3L removed. Plan for HD today.     Tmax 99.1. NSR. Sats 99% on 3L O2. Ox4. Telemetry. Norvasc, eliquis, catapres, prn valium, hydralazine, nebs, imdur, labetalol, lokelma. Received Ca+ gluconate x1 and 250 ml LR bolus x1 yesterday. Rapid flu & COVID 19 were negative. Respiratory culture sent - PCR was negative. K+ 6.1 - now 5.8, BUN 83 - now 47, Creat 10.9 - now 7, trop 136 - now 128, NT pro-bnp 85513, hgb 8.8 - now 8.3.     Procedures: none    Imagin/29 CXR: Persistent interstitial opacities in the mid and lower lung zones. Stable blunting of the right costophrenic angle.    Patient Goals/Plan/Treatment Preferences: Home w/wife. Independent PTA. Wears 3-4L O2 PRN - purchased his own concentrator. HD TRS @ [facility] with 0655 chair time - RTA transports. Declines HH. Will need ride at discharge.      25 1222   Service Assessment   Patient Orientation Alert and Oriented

## 2025-04-30 NOTE — PLAN OF CARE
Problem: Chronic Conditions and Co-morbidities  Goal: Patient's chronic conditions and co-morbidity symptoms are monitored and maintained or improved  Outcome: Progressing     Problem: Discharge Planning  Goal: Discharge to home or other facility with appropriate resources  Outcome: Progressing     Problem: Pain  Goal: Verbalizes/displays adequate comfort level or baseline comfort level  Outcome: Progressing     Problem: Safety - Adult  Goal: Free from fall injury  Outcome: Progressing     Problem: ABCDS Injury Assessment  Goal: Absence of physical injury  Outcome: Progressing     Problem: Respiratory - Adult  Goal: Achieves optimal ventilation and oxygenation  Outcome: Progressing     Care plan reviewed with patient.  Patient verbalizes understanding of the plan of care and contributes to goal setting.

## 2025-04-30 NOTE — FLOWSHEET NOTE
04/30/25 1647   Vital Signs   BP (!) 143/75   Temp 98.8 °F (37.1 °C)   Pulse 81   Respirations 16   SpO2 99 %   Weight - Scale 94.2 kg (207 lb 10.8 oz)   Percent Weight Change 0   Post-Hemodialysis Assessment   Post-Treatment Procedures Blood returned;Access bleeding time < 10 minutes   Machine Disinfection Process Acid/Vinegar Clean;Heat Disinfect;Exterior Machine Disinfection   Rinseback Volume (ml) 400 ml   Blood Volume Processed (Liters) 88.2 L   Dialyzer Clearance Lightly streaked   Duration of Treatment (minutes) 240 minutes   Hemodialysis Intake (ml) 1600 ml   Hemodialysis Output (ml) 300 ml   NET Removed (ml) -1300   Tolerated Treatment Good     pt completed 3 hours and 37 min., of prescribed 4 hr HD TX. pt venous needle pressure increased, pt complained venous needle pain at the site. pt requested to stop Dialysis TX due to pain and needle is against a nerve. Dr. Brown notified. all blood returned with 400 mls rinse back. removed X2 (15g needles) applied sterile gauze with light pressure for less than 10 min. pt returned to his room with left access arm dressing clean, dry and intact. report given to primary nurse. record completed and printed to be scanned into pt's EMR.

## 2025-05-01 VITALS
DIASTOLIC BLOOD PRESSURE: 62 MMHG | HEIGHT: 75 IN | TEMPERATURE: 97.7 F | WEIGHT: 205.91 LBS | HEART RATE: 82 BPM | RESPIRATION RATE: 16 BRPM | BODY MASS INDEX: 25.6 KG/M2 | SYSTOLIC BLOOD PRESSURE: 139 MMHG | OXYGEN SATURATION: 95 %

## 2025-05-01 LAB
ANION GAP SERPL CALC-SCNC: 11 MEQ/L (ref 8–16)
BASOPHILS ABSOLUTE: 0 THOU/MM3 (ref 0–0.1)
BASOPHILS NFR BLD AUTO: 0.4 %
BUN SERPL-MCNC: 32 MG/DL (ref 8–23)
CALCIUM SERPL-MCNC: 8.4 MG/DL (ref 8.6–10)
CHLORIDE SERPL-SCNC: 104 MEQ/L (ref 98–111)
CO2 SERPL-SCNC: 26 MEQ/L (ref 22–29)
CREAT SERPL-MCNC: 5.3 MG/DL (ref 0.7–1.2)
DEPRECATED RDW RBC AUTO: 57.2 FL (ref 35–45)
EOSINOPHIL NFR BLD AUTO: 1.6 %
EOSINOPHILS ABSOLUTE: 0.1 THOU/MM3 (ref 0–0.4)
ERYTHROCYTE [DISTWIDTH] IN BLOOD BY AUTOMATED COUNT: 14.8 % (ref 11.5–14.5)
GFR SERPL CREATININE-BSD FRML MDRD: 12 ML/MIN/1.73M2
GLUCOSE SERPL-MCNC: 148 MG/DL (ref 74–109)
HCT VFR BLD AUTO: 24.7 % (ref 42–52)
HGB BLD-MCNC: 7.4 GM/DL (ref 14–18)
IMM GRANULOCYTES # BLD AUTO: 0.04 THOU/MM3 (ref 0–0.07)
IMM GRANULOCYTES NFR BLD AUTO: 0.7 %
LYMPHOCYTES ABSOLUTE: 1 THOU/MM3 (ref 1–4.8)
LYMPHOCYTES NFR BLD AUTO: 17.9 %
MCH RBC QN AUTO: 31.8 PG (ref 26–33)
MCHC RBC AUTO-ENTMCNC: 30 GM/DL (ref 32.2–35.5)
MCV RBC AUTO: 106 FL (ref 80–94)
MONOCYTES ABSOLUTE: 0.6 THOU/MM3 (ref 0.4–1.3)
MONOCYTES NFR BLD AUTO: 11.7 %
MRSA SPEC QL CULT: NORMAL
NEUTROPHILS ABSOLUTE: 3.7 THOU/MM3 (ref 1.8–7.7)
NEUTROPHILS NFR BLD AUTO: 67.7 %
NRBC BLD AUTO-RTO: 0 /100 WBC
PLATELET # BLD AUTO: 180 THOU/MM3 (ref 130–400)
PMV BLD AUTO: 9.6 FL (ref 9.4–12.4)
POTASSIUM SERPL-SCNC: 4.8 MEQ/L (ref 3.5–5.2)
RBC # BLD AUTO: 2.33 MILL/MM3 (ref 4.7–6.1)
SODIUM SERPL-SCNC: 141 MEQ/L (ref 135–145)
WBC # BLD AUTO: 5.5 THOU/MM3 (ref 4.8–10.8)

## 2025-05-01 PROCEDURE — 36415 COLL VENOUS BLD VENIPUNCTURE: CPT

## 2025-05-01 PROCEDURE — 6370000000 HC RX 637 (ALT 250 FOR IP): Performed by: INTERNAL MEDICINE

## 2025-05-01 PROCEDURE — 2500000003 HC RX 250 WO HCPCS: Performed by: INTERNAL MEDICINE

## 2025-05-01 PROCEDURE — 6370000000 HC RX 637 (ALT 250 FOR IP): Performed by: PHYSICIAN ASSISTANT

## 2025-05-01 PROCEDURE — 94640 AIRWAY INHALATION TREATMENT: CPT

## 2025-05-01 PROCEDURE — 99239 HOSP IP/OBS DSCHRG MGMT >30: CPT | Performed by: INTERNAL MEDICINE

## 2025-05-01 PROCEDURE — 94761 N-INVAS EAR/PLS OXIMETRY MLT: CPT

## 2025-05-01 PROCEDURE — 85025 COMPLETE CBC W/AUTO DIFF WBC: CPT

## 2025-05-01 PROCEDURE — 80048 BASIC METABOLIC PNL TOTAL CA: CPT

## 2025-05-01 PROCEDURE — 2700000000 HC OXYGEN THERAPY PER DAY

## 2025-05-01 PROCEDURE — 90935 HEMODIALYSIS ONE EVALUATION: CPT

## 2025-05-01 RX ORDER — BENZONATATE 200 MG/1
200 CAPSULE ORAL 3 TIMES DAILY PRN
Qty: 21 CAPSULE | Refills: 0 | Status: SHIPPED | OUTPATIENT
Start: 2025-05-01 | End: 2025-05-08

## 2025-05-01 RX ORDER — GUAIFENESIN/DEXTROMETHORPHAN 100-10MG/5
5 SYRUP ORAL EVERY 4 HOURS PRN
Status: DISCONTINUED | OUTPATIENT
Start: 2025-05-01 | End: 2025-05-01 | Stop reason: HOSPADM

## 2025-05-01 RX ORDER — AZITHROMYCIN 250 MG/1
TABLET, FILM COATED ORAL
Qty: 6 TABLET | Refills: 0 | Status: SHIPPED | OUTPATIENT
Start: 2025-05-01 | End: 2025-05-01 | Stop reason: HOSPADM

## 2025-05-01 RX ORDER — BENZONATATE 100 MG/1
200 CAPSULE ORAL 3 TIMES DAILY PRN
Status: DISCONTINUED | OUTPATIENT
Start: 2025-05-01 | End: 2025-05-01 | Stop reason: HOSPADM

## 2025-05-01 RX ORDER — PREDNISONE 20 MG/1
20 TABLET ORAL DAILY
Qty: 1 TABLET | Refills: 0 | Status: SHIPPED | OUTPATIENT
Start: 2025-05-02 | End: 2025-05-03

## 2025-05-01 RX ADMIN — GUAIFENESIN SYRUP AND DEXTROMETHORPHAN 5 ML: 100; 10 SYRUP ORAL at 05:53

## 2025-05-01 RX ADMIN — BENZONATATE 200 MG: 100 CAPSULE ORAL at 01:40

## 2025-05-01 RX ADMIN — HYDRALAZINE HYDROCHLORIDE 100 MG: 50 TABLET ORAL at 01:40

## 2025-05-01 RX ADMIN — SODIUM ZIRCONIUM CYCLOSILICATE 10 G: 10 POWDER, FOR SUSPENSION ORAL at 07:36

## 2025-05-01 RX ADMIN — APIXABAN 2.5 MG: 2.5 TABLET, FILM COATED ORAL at 07:36

## 2025-05-01 RX ADMIN — IPRATROPIUM BROMIDE AND ALBUTEROL SULFATE 1 DOSE: .5; 3 SOLUTION RESPIRATORY (INHALATION) at 07:32

## 2025-05-01 RX ADMIN — CLONIDINE HYDROCHLORIDE 0.3 MG: 0.1 TABLET ORAL at 13:19

## 2025-05-01 RX ADMIN — LABETALOL HYDROCHLORIDE 100 MG: 100 TABLET, FILM COATED ORAL at 13:19

## 2025-05-01 RX ADMIN — PREDNISONE 20 MG: 20 TABLET ORAL at 07:36

## 2025-05-01 RX ADMIN — ATORVASTATIN CALCIUM 40 MG: 40 TABLET, FILM COATED ORAL at 07:36

## 2025-05-01 RX ADMIN — LABETALOL HYDROCHLORIDE 100 MG: 100 TABLET, FILM COATED ORAL at 05:54

## 2025-05-01 RX ADMIN — ASPIRIN 81 MG: 81 TABLET, COATED ORAL at 07:36

## 2025-05-01 RX ADMIN — SODIUM ZIRCONIUM CYCLOSILICATE 10 G: 10 POWDER, FOR SUSPENSION ORAL at 13:26

## 2025-05-01 RX ADMIN — SODIUM CHLORIDE, PRESERVATIVE FREE 10 ML: 5 INJECTION INTRAVENOUS at 07:36

## 2025-05-01 ASSESSMENT — PAIN SCALES - GENERAL: PAINLEVEL_OUTOF10: 0

## 2025-05-01 NOTE — PLAN OF CARE
Problem: Respiratory - Adult  Goal: Achieves optimal ventilation and oxygenation  5/1/2025 0427 by Rohith Juan, RN  Flowsheets (Taken 4/30/2025 2000)  Achieves optimal ventilation and oxygenation:   Assess for changes in respiratory status   Assess for changes in mentation and behavior   Position to facilitate oxygenation and minimize respiratory effort   Oxygen supplementation based on oxygen saturation or arterial blood gases  4/30/2025 2042 by Jeffrey Stallings RCP  Outcome: Progressing  Flowsheets (Taken 4/30/2025 2000 by Rohith Juan RN)  Achieves optimal ventilation and oxygenation:   Assess for changes in respiratory status   Assess for changes in mentation and behavior   Position to facilitate oxygenation and minimize respiratory effort   Oxygen supplementation based on oxygen saturation or arterial blood gases  Goal: Lung sounds clear or within normal limits for patient  4/30/2025 2042 by Jeffrey Stallings RCP  Outcome: Progressing     Problem: Chronic Conditions and Co-morbidities  Goal: Patient's chronic conditions and co-morbidity symptoms are monitored and maintained or improved  Flowsheets (Taken 4/30/2025 2000)  Care Plan - Patient's Chronic Conditions and Co-Morbidity Symptoms are Monitored and Maintained or Improved:   Monitor and assess patient's chronic conditions and comorbid symptoms for stability, deterioration, or improvement   Collaborate with multidisciplinary team to address chronic and comorbid conditions and prevent exacerbation or deterioration   Update acute care plan with appropriate goals if chronic or comorbid symptoms are exacerbated and prevent overall improvement and discharge     Problem: Discharge Planning  Goal: Discharge to home or other facility with appropriate resources  Flowsheets (Taken 4/30/2025 2000)  Discharge to home or other facility with appropriate resources:   Identify barriers to discharge with patient and caregiver   Arrange for needed discharge resources

## 2025-05-01 NOTE — RT PROTOCOL NOTE
RT Inhaler-Nebulizer Bronchodilator Protocol Note    There is a bronchodilator order in the chart from a provider indicating to follow the RT Bronchodilator Protocol and there is an “Initiate RT Inhaler-Nebulizer Bronchodilator Protocol” order as well (see protocol at bottom of note).    CXR Findings:  No results found.    The findings from the last RT Protocol Assessment were as follows:   History Pulmonary Disease: None or smoker <15 pack years  Respiratory Pattern: Dyspnea on exertion or RR 21-25 bpm  Breath Sounds: Slightly diminished and/or crackles  Cough: Strong, spontaneous, non-productive  Indication for Bronchodilator Therapy: Decreased or absent breath sounds  Bronchodilator Assessment Score: 4    Aerosolized bronchodilator medication orders have been revised according to the RT Inhaler-Nebulizer Bronchodilator Protocol below.    Respiratory Therapist to perform RT Therapy Protocol Assessment initially then follow the protocol.  Repeat RT Therapy Protocol Assessment PRN for score 0-3 or on second treatment, BID, and PRN for scores above 3.    No Indications - adjust the frequency to every 6 hours PRN wheezing or bronchospasm, if no treatments needed after 48 hours then discontinue using Per Protocol order mode.     If indication present, adjust the RT bronchodilator orders based on the Bronchodilator Assessment Score as indicated below.  Use Inhaler orders unless patient has one or more of the following: on home nebulizer, not able to hold breath for 10 seconds, is not alert and oriented, cannot activate and use MDI correctly, or respiratory rate 25 breaths per minute or more, then use the equivalent nebulizer order(s) with same Frequency and PRN reasons based on the score.  If a patient is on this medication at home then do not decrease Frequency below that used at home.    0-3 - enter or revise RT bronchodilator order(s) to equivalent RT Bronchodilator order with Frequency of every 4 hours PRN for 
RT Nebulizer Bronchodilator Protocol Note    There is a bronchodilator order in the chart from a provider indicating to follow the RT Bronchodilator Protocol and there is an “Initiate RT Bronchodilator Protocol” order as well (see protocol at bottom of note).    CXR Findings:  No results found.    The findings from the last RT Protocol Assessment were as follows:  Smoking: None or smoker <15 pack years  Respiratory Pattern: Dyspnea on exertion or RR 21-25 bpm  Breath Sounds: Slightly diminished and/or crackles  Cough: Strong, spontaneous, non-productive  Indication for Bronchodilator Therapy: Decreased or absent breath sounds  Bronchodilator Assessment Score: 4    Aerosolized bronchodilator medication orders have been revised according to the RT Nebulizer Bronchodilator Protocol below.    Respiratory Therapist to perform RT Therapy Protocol Assessment initially then follow the protocol.  Repeat RT Therapy Protocol Assessment PRN for score 0-3 or on second treatment, BID, and PRN for scores above 3.    No Indications - adjust the frequency to every 6 hours PRN wheezing or bronchospasm, if no treatments needed after 48 hours then discontinue using Per Protocol order mode.     If indication present, adjust the RT bronchodilator orders based on the Bronchodilator Assessment Score as indicated below.  If a patient is on this medication at home then do not decrease Frequency below that used at home.    0-3 - enter or revise RT bronchodilator order(s) to equivalent RT Bronchodilator order with Frequency of every 4 hours PRN for wheezing or increased work of breathing using Per Protocol order mode.       4-6 - enter or revise RT Bronchodilator order(s) to two equivalent RT bronchodilator orders with one order with BID Frequency and one order with Frequency of every 4 hours PRN wheezing or increased work of breathing using Per Protocol order mode.         7-10 - enter or revise RT Bronchodilator order(s) to two equivalent RT 
wheezing or increased work of breathing using Per Protocol order mode.        4-6 - enter or revise RT Bronchodilator order(s) to two equivalent RT bronchodilator orders with one order with BID Frequency and one order with Frequency of every 4 hours PRN wheezing or increased work of breathing using Per Protocol order mode.        7-10 - enter or revise RT Bronchodilator order(s) to two equivalent RT bronchodilator orders with one order with TID Frequency and one order with Frequency of every 4 hours PRN wheezing or increased work of breathing using Per Protocol order mode.       11-13 - enter or revise RT Bronchodilator order(s) to one equivalent RT bronchodilator order with QID Frequency and an Albuterol order with Frequency of every 4 hours PRN wheezing or increased work of breathing using Per Protocol order mode.      Greater than 13 - enter or revise RT Bronchodilator order(s) to one equivalent RT bronchodilator order with every 4 hours Frequency and an Albuterol order with Frequency of every 2 hours PRN wheezing or increased work of breathing using Per Protocol order mode.     RT to enter RT Home Evaluation for COPD & MDI Assessment order using Per Protocol order mode.    Electronically signed by Jackie Gray RCP on 4/29/2025 at 9:06 PM

## 2025-05-01 NOTE — PLAN OF CARE
Problem: Chronic Conditions and Co-morbidities  Goal: Patient's chronic conditions and co-morbidity symptoms are monitored and maintained or improved  5/1/2025 0941 by Yue Estrada RN  Outcome: Progressing     Problem: Discharge Planning  Goal: Discharge to home or other facility with appropriate resources  5/1/2025 0941 by Yue Estrada RN  Outcome: Progressing     Problem: Pain  Goal: Verbalizes/displays adequate comfort level or baseline comfort level  5/1/2025 0941 by Yue Estrada RN  Outcome: Progressing     Problem: Safety - Adult  Goal: Free from fall injury  5/1/2025 0941 by Yue Estrada RN  Outcome: Progressing     Problem: ABCDS Injury Assessment  Goal: Absence of physical injury  5/1/2025 0941 by Yue Estrada RN  Outcome: Progressing     Problem: Respiratory - Adult  Goal: Achieves optimal ventilation and oxygenation  5/1/2025 0941 by Yue Estrada RN  Outcome: Progressing  Flowsheets (Taken 5/1/2025 0732 by Gissel Mcpherson, NOLBERTO)  Achieves optimal ventilation and oxygenation: Respiratory therapy support as indicated

## 2025-05-01 NOTE — DISCHARGE SUMMARY
Axis -45 degrees    T Axis 84 degrees       Diet:  ADULT DIET; Regular; Low Sodium (2 gm); Low Potassium (Less than 3000 mg/day)    Activity:  Activity as tolerated (Patient may move about with assist as indicated or with supervision.)    Follow-up:  in the next few days with Radha Hou APRN - CNP,  in the next few days with your primary care doctor and please keep an eye on your blood pressures at home as discussed please continue to take your blood pressure medications as directed you may return to ER at anytime if you feel return of symptoms, please continue your dialysis Tuesday Thursday and Saturday    Disposition: home    Condition: Stable      Time Spent: 35 minutes    Electronically signed by Edu Armando MD on 5/1/2025 at 2:43 PM    Discharging Hospitalist

## 2025-05-01 NOTE — PROGRESS NOTES
AVS reviewed in great detail. No further questions at this time. LACP called for CAB.   
[facility]   Nephrology progress note  4/30/2025, 10:12 AM      Pt Name:    Trav Jennings  MRN:     [id number]     YOB: 1965  Admit Date:    4/29/2025  5:54 AM    Chief Complaint: Nephrology following for ESRD and hyperkalemia    Subjective:  Patient was seen and examined this morning  No chest pain or shortness of breath  Feels okay  No bleeding from fistula site    Objective:  24HR INTAKE/OUTPUT:    Intake/Output Summary (Last 24 hours) at 4/30/2025 1012  Last data filed at 4/30/2025 0800  Gross per 24 hour   Intake 978.26 ml   Output 3400 ml   Net -2421.74 ml         I/O last 3 completed shifts:  In: 968.3 [P.O.:420; IV Piggyback:148.3]  Out: 3400   I/O this shift:  In: 10 [I.V.:10]  Out: -    Admission weight: 96.6 kg (213 lb)  Wt Readings from Last 3 Encounters:   04/30/25 94.2 kg (207 lb 10.8 oz)   04/08/25 96.9 kg (213 lb 9.6 oz)   04/05/25 93.3 kg (205 lb 11 oz)        Vitals :   Vitals:    04/30/25 0400 04/30/25 0539 04/30/25 0757 04/30/25 0912   BP: 122/60 (!) 118/57 (!) 153/74 (!) 153/74   Pulse: 78  72    Resp: 15  16    Temp: 98.8 °F (37.1 °C)  98.1 °F (36.7 °C)    TempSrc: Oral  Oral    SpO2: 98%  98%    Weight: 94.2 kg (207 lb 10.8 oz)      Height: 1.905 m (6' 3\")          Physical examination  General Appearance: alert and cooperative with exam, appears comfortable, no distress  Mouth/Throat: Oral mucosa moist  Neck: No JVD  Lungs: no use of accessory muscles  GI: soft, non-tender, no guarding  Extremities: no sig LE edema    Medications:  Infusion:    sodium chloride       Meds:    ipratropium 0.5 mg-albuterol 2.5 mg  1 Dose Inhalation BID RT    amiodarone  200 mg Oral Daily    amLODIPine  5 mg Oral Daily    apixaban  2.5 mg Oral BID    aspirin  81 mg Oral Daily    atorvastatin  40 mg Oral Daily    cloNIDine  0.3 mg Oral TID    hydrALAZINE  100 mg Oral q8h    isosorbide mononitrate  120 mg Oral BID    labetalol  100 mg Oral 3 times per day    sodium chloride flush  
[facility]   Nephrology progress note  5/1/2025, 11:43 AM      Pt Name:    Trav Jennings  MRN:     [id number]     YOB: 1965  Admit Date:    4/29/2025  5:54 AM    Chief Complaint: Nephrology following for ESRD and hyperkalemia    Subjective:  Patient was seen and examined this morning  No chest pain or shortness of breath  Feels okay  No bleeding from fistula site    Objective:  24HR INTAKE/OUTPUT:    Intake/Output Summary (Last 24 hours) at 5/1/2025 1143  Last data filed at 4/30/2025 2325  Gross per 24 hour   Intake 2400 ml   Output 300 ml   Net 2100 ml         I/O last 3 completed shifts:  In: 2680 [P.O.:1070; I.V.:10]  Out: 300   No intake/output data recorded.   Admission weight: 96.6 kg (213 lb)  Wt Readings from Last 3 Encounters:   05/01/25 96.4 kg (212 lb 8.4 oz)   04/08/25 96.9 kg (213 lb 9.6 oz)   04/05/25 93.3 kg (205 lb 11 oz)        Vitals :   Vitals:    05/01/25 0317 05/01/25 0726 05/01/25 0732 05/01/25 0803   BP: 121/62 (!) 118/58  (!) 117/52   Pulse: 80 76  78   Resp: 18 18  18   Temp: 97.6 °F (36.4 °C) 98 °F (36.7 °C)  97.6 °F (36.4 °C)   TempSrc: Oral Oral     SpO2: 100% 97% 97% 96%   Weight:    96.4 kg (212 lb 8.4 oz)   Height:           Physical examination  General Appearance: alert and cooperative with exam, appears comfortable, no distress  Neck: No JVD noted visibly  Lungs: no use of accessory muscles  GI: soft, non-tender, no guarding  Extremities: no sig LE edema    Medications:  Infusion:    sodium chloride       Meds:    ipratropium 0.5 mg-albuterol 2.5 mg  1 Dose Inhalation BID RT    epoetin qiana-epbx  6,000 Units SubCUTAneous Once per day on Monday Wednesday Friday    sodium zirconium cyclosilicate  10 g Oral TID    amiodarone  200 mg Oral Daily    amLODIPine  5 mg Oral Daily    apixaban  2.5 mg Oral BID    aspirin  81 mg Oral Daily    atorvastatin  40 mg Oral Daily    cloNIDine  0.3 mg Oral TID    hydrALAZINE  100 mg Oral q8h    isosorbide mononitrate  
Patient arrived via stretcher from dialysis. Pt attached to monitor. Vital signs per chart. Pt requests \"boxed lunch\" prior to assessment. Cowley and applesauce provided to patient. Pt oriented to room, educated on fall risk precautions. Call light within reach, bed alarm on.  
Patient discharged in stable condition at this time.   
Spiritual Health History and Assessment/Progress Note  [facility]    Spiritual/Emotional Needs, Initial Encounter,  ,  ,      Name: Trav Jennings MRN: [id number]    Age: 60 y.o.     Sex: male   Language: English   Christian: Christian   Hypertensive urgency     Date: 4/30/2025            Total Time Calculated: (P) 10 min              Spiritual Assessment began in [facility] 4B        Referral/Consult From: Rounding   Encounter Overview/Reason: Spiritual/Emotional Needs, Initial Encounter  Service Provided For: Patient    Elizabeth, Belief, Meaning:   Patient identifies as spiritual  Family/Friends identify as spiritual      Importance and Influence:  Patient has spiritual/personal beliefs that influence decisions regarding their health  Family/Friends have spiritual/personal beliefs that influence decisions regarding the patient's health    Community:  Patient feels well-supported. Support system includes: Spouse/Partner  Family/Friends feel well-supported. Support system includes: Spouse/Partner    Assessment and Plan of Care:   In my encounter with the 60 yr old patient, while rounding the unit 4B,  I provided spiritual care to patient through conversation, I also came to assess the patient's spiritual needs present. The pt was admitted due to hypertensive urgency.     I provided prayer, emotional support and words of comfort.  provided a listening presence and encouraged pt to share their beliefs and how I can support them during their hospitalization.      Outcomes:  The patient was encouraged and didn't share any further spiritual needs at this time.     Patient Interventions include: Facilitated expression of thoughts and feelings  Family/Friends Interventions include: Facilitated expression of thoughts and feelings    Patient Plan of Care: Spiritual Care available upon further referral  Family/Friends Plan of Care: Spiritual Care available upon further 
bases and a pleural effusion versus pleural thickening in the right lung base. **This report has been created using voice recognition software. It may contain minor errors which are inherent in voice recognition technology.** Electronically signed by Dr. Jessica Horne        Physical Exam:  Vitals: /64   Pulse 82   Temp 97.9 °F (36.6 °C) (Oral)   Resp 15   Ht 1.905 m (6' 3\")   Wt 94.2 kg (207 lb 10.8 oz)   SpO2 99%   BMI 25.96 kg/m²   24 hour intake/output:  Intake/Output Summary (Last 24 hours) at 4/30/2025 1402  Last data filed at 4/30/2025 0800  Gross per 24 hour   Intake 978.26 ml   Output 3400 ml   Net -2421.74 ml     Last 3 weights:  Wt Readings from Last 3 Encounters:   04/30/25 94.2 kg (207 lb 10.8 oz)   04/08/25 96.9 kg (213 lb 9.6 oz)   04/05/25 93.3 kg (205 lb 11 oz)       Physical Examination: General appearance - alert, awake appears to be in no acute distress  HEENT: Atraumatic normocephalic,no JVD, trachea is midline  Neck - supple, no significant adenopathy, no JVD, or carotid bruits  Chest - Bilateral air entry, no wheezes, crackles or rhonchi.  Non tender to palpation of chest wall  Heart - S1S2 RRR, no murmurs or gallops  Abdomen - Soft, non tender non distended.  Normoactive bowel sounds  Neurological - II-XII grossly intact, no neurological deficits  Musculoskeletal - 5/5 power upper and lower extremities equal bilaterally.  Full ROM of all limbs  Extremities -left upper extremity fistula with engorgement of the central veins no erythema he has no pitting edema of upper or lower extremities  Skin - normal coloration and turgor, no rashes, no suspicious skin lesions noted    DVT prophylaxis: [] Lovenox                                 [] SCDs                                 [] SQ Heparin                                 [x] Encourage ambulation, patient received heparin with dialysis           [] Already on Anticoagulation               Electronically signed by Edu Armando MD on

## 2025-05-01 NOTE — FLOWSHEET NOTE
05/01/25 0803 05/01/25 1230   Vital Signs   BP (!) 117/52 138/64   Temp 97.6 °F (36.4 °C) 98.3 °F (36.8 °C)   Pulse 78 78   Respirations 18 16   SpO2 96 % 98 %   Weight - Scale 96.4 kg (212 lb 8.4 oz) 93.4 kg (205 lb 14.6 oz)   Weight Method Standing scale Estimated   Percent Weight Change 2.34 -3.11   Post-Hemodialysis Assessment   Post-Treatment Procedures  --  Blood returned;Access bleeding time < 10 minutes   Machine Disinfection Process  --  Acid/Vinegar Clean;Heat Disinfect;Exterior Machine Disinfection   Blood Volume Processed (Liters)  --  114.2 L   Dialyzer Clearance  --  Lightly streaked   Duration of Treatment (minutes)  --  240 minutes   Heparin Amount Administered During Treatment (mL)  --  0 mL   Hemodialysis Intake (ml)  --  400 ml   Hemodialysis Output (ml)  --  3400 ml   NET Removed (ml)  --  3000     4 hour treatment completed. 3L of fluid remover per patient request. Pressure held to access for 10 mins x2. Dressing clean/dry and intact upon leaving the unit. Report given to primary RN. Charting printed and placed in bin to be scanned into EMR.

## 2025-05-01 NOTE — PLAN OF CARE
Problem: Respiratory - Adult  Goal: Achieves optimal ventilation and oxygenation  4/30/2025 2042 by Jeffrey Stallings RCP  Outcome: Progressing  Problem: Respiratory - Adult  Goal: Lung sounds clear or within normal limits for patient  Outcome: Progressing

## 2025-05-05 PROBLEM — R79.89 ELEVATED TROPONIN: Status: RESOLVED | Noted: 2025-04-05 | Resolved: 2025-05-05

## 2025-05-29 ENCOUNTER — TELEPHONE (OUTPATIENT)
Dept: PULMONOLOGY | Age: 60
End: 2025-05-29

## 2025-06-04 ENCOUNTER — HOSPITAL ENCOUNTER (INPATIENT)
Age: 60
LOS: 1 days | Discharge: HOME | DRG: 640 | End: 2025-06-05
Attending: STUDENT IN AN ORGANIZED HEALTH CARE EDUCATION/TRAINING PROGRAM
Payer: MEDICARE

## 2025-06-04 ENCOUNTER — APPOINTMENT (OUTPATIENT)
Dept: GENERAL RADIOLOGY | Age: 60
DRG: 640 | End: 2025-06-04
Payer: MEDICARE

## 2025-06-04 ENCOUNTER — APPOINTMENT (OUTPATIENT)
Age: 60
DRG: 640 | End: 2025-06-04
Payer: MEDICARE

## 2025-06-04 DIAGNOSIS — R94.31 PROLONGED Q-T INTERVAL ON ECG: ICD-10-CM

## 2025-06-04 DIAGNOSIS — R07.9 CHEST PAIN, UNSPECIFIED TYPE: Primary | ICD-10-CM

## 2025-06-04 DIAGNOSIS — R06.02 SHORTNESS OF BREATH: ICD-10-CM

## 2025-06-04 DIAGNOSIS — J81.0 ACUTE PULMONARY EDEMA (HCC): ICD-10-CM

## 2025-06-04 DIAGNOSIS — Z99.2 ESRD ON DIALYSIS (HCC): ICD-10-CM

## 2025-06-04 DIAGNOSIS — N18.6 ESRD ON DIALYSIS (HCC): ICD-10-CM

## 2025-06-04 DIAGNOSIS — R79.89 ELEVATED TROPONIN: ICD-10-CM

## 2025-06-04 PROBLEM — E87.70 FLUID OVERLOAD: Status: ACTIVE | Noted: 2025-06-04

## 2025-06-04 LAB
ALBUMIN SERPL BCG-MCNC: 3.9 G/DL (ref 3.4–4.9)
ALP SERPL-CCNC: 82 U/L (ref 40–129)
ALT SERPL W/O P-5'-P-CCNC: 9 U/L (ref 10–50)
ANION GAP SERPL CALC-SCNC: 22 MEQ/L (ref 8–16)
ANION GAP SERPL CALC-SCNC: 25 MEQ/L (ref 8–16)
AST SERPL-CCNC: 22 U/L (ref 10–50)
BASOPHILS ABSOLUTE: 0 THOU/MM3 (ref 0–0.1)
BASOPHILS NFR BLD AUTO: 0.5 %
BILIRUB CONJ SERPL-MCNC: 0.2 MG/DL (ref 0–0.2)
BILIRUB SERPL-MCNC: 0.4 MG/DL (ref 0.3–1.2)
BUN SERPL-MCNC: 93 MG/DL (ref 8–23)
BUN SERPL-MCNC: 99 MG/DL (ref 8–23)
CALCIUM SERPL-MCNC: 9 MG/DL (ref 8.6–10)
CALCIUM SERPL-MCNC: 9.2 MG/DL (ref 8.6–10)
CHLORIDE SERPL-SCNC: 96 MEQ/L (ref 98–111)
CHLORIDE SERPL-SCNC: 97 MEQ/L (ref 98–111)
CO2 SERPL-SCNC: 17 MEQ/L (ref 22–29)
CO2 SERPL-SCNC: 20 MEQ/L (ref 22–29)
CREAT SERPL-MCNC: 15 MG/DL (ref 0.7–1.2)
CREAT SERPL-MCNC: 15.5 MG/DL (ref 0.7–1.2)
DEPRECATED RDW RBC AUTO: 56.3 FL (ref 35–45)
ECHO BSA: 2.29 M2
ECHO LA DIAMETER INDEX: 2 CM/M2
ECHO LA DIAMETER: 4.5 CM
ECHO LV EDV A2C: 103 ML
ECHO LV EDV A4C: 154 ML
ECHO LV EDV INDEX A4C: 68 ML/M2
ECHO LV EDV NDEX A2C: 46 ML/M2
ECHO LV EF PHYSICIAN: 70 %
ECHO LV EJECTION FRACTION A2C: 70 %
ECHO LV EJECTION FRACTION A4C: 71 %
ECHO LV EJECTION FRACTION BIPLANE: 69 % (ref 55–100)
ECHO LV ESV A2C: 31 ML
ECHO LV ESV A4C: 44 ML
ECHO LV ESV INDEX A2C: 14 ML/M2
ECHO LV ESV INDEX A4C: 20 ML/M2
ECHO LV FRACTIONAL SHORTENING: 47 % (ref 28–44)
ECHO LV INTERNAL DIMENSION DIASTOLE INDEX: 1.69 CM/M2
ECHO LV INTERNAL DIMENSION DIASTOLIC: 3.8 CM (ref 4.2–5.9)
ECHO LV INTERNAL DIMENSION SYSTOLIC INDEX: 0.89 CM/M2
ECHO LV INTERNAL DIMENSION SYSTOLIC: 2 CM
ECHO LV IVSD: 2.6 CM (ref 0.6–1)
ECHO LV MASS 2D: 561.5 G (ref 88–224)
ECHO LV MASS INDEX 2D: 249.5 G/M2 (ref 49–115)
ECHO LV POSTERIOR WALL DIASTOLIC: 2.6 CM (ref 0.6–1)
ECHO LV RELATIVE WALL THICKNESS RATIO: 1.37
ECHO MV MAX VELOCITY: 2 M/S
ECHO MV MEAN GRADIENT: 9 MMHG
ECHO MV MEAN VELOCITY: 1.4 M/S
ECHO MV PEAK GRADIENT: 16 MMHG
ECHO MV VTI: 42.4 CM
ECHO RV INTERNAL DIMENSION: 3.4 CM
EKG ATRIAL RATE: 95 BPM
EKG P AXIS: 75 DEGREES
EKG P-R INTERVAL: 204 MS
EKG Q-T INTERVAL: 416 MS
EKG QRS DURATION: 116 MS
EKG QTC CALCULATION (BAZETT): 522 MS
EKG R AXIS: -72 DEGREES
EKG T AXIS: 82 DEGREES
EKG VENTRICULAR RATE: 95 BPM
EOSINOPHIL NFR BLD AUTO: 1.9 %
EOSINOPHILS ABSOLUTE: 0.1 THOU/MM3 (ref 0–0.4)
ERYTHROCYTE [DISTWIDTH] IN BLOOD BY AUTOMATED COUNT: 15 % (ref 11.5–14.5)
GFR SERPL CREATININE-BSD FRML MDRD: 3 ML/MIN/1.73M2
GFR SERPL CREATININE-BSD FRML MDRD: 3 ML/MIN/1.73M2
GLUCOSE SERPL-MCNC: 111 MG/DL (ref 74–109)
GLUCOSE SERPL-MCNC: 116 MG/DL (ref 74–109)
HCT VFR BLD AUTO: 30.2 % (ref 42–52)
HGB BLD-MCNC: 9.4 GM/DL (ref 14–18)
IMM GRANULOCYTES # BLD AUTO: 0.02 THOU/MM3 (ref 0–0.07)
IMM GRANULOCYTES NFR BLD AUTO: 0.3 %
LACTATE SERPL-SCNC: 2.3 MMOL/L (ref 0.5–2.2)
LIPASE SERPL-CCNC: 28 U/L (ref 13–60)
LYMPHOCYTES ABSOLUTE: 0.8 THOU/MM3 (ref 1–4.8)
LYMPHOCYTES NFR BLD AUTO: 13.1 %
MAGNESIUM SERPL-MCNC: 2.3 MG/DL (ref 1.6–2.6)
MCH RBC QN AUTO: 31.8 PG (ref 26–33)
MCHC RBC AUTO-ENTMCNC: 31.1 GM/DL (ref 32.2–35.5)
MCV RBC AUTO: 102 FL (ref 80–94)
MONOCYTES ABSOLUTE: 0.4 THOU/MM3 (ref 0.4–1.3)
MONOCYTES NFR BLD AUTO: 5.8 %
NEUTROPHILS ABSOLUTE: 5 THOU/MM3 (ref 1.8–7.7)
NEUTROPHILS NFR BLD AUTO: 78.4 %
NRBC BLD AUTO-RTO: 0 /100 WBC
NT-PROBNP SERPL IA-MCNC: ABNORMAL PG/ML (ref 0–124)
OSMOLALITY SERPL CALC.SUM OF ELEC: 305.1 MOSMOL/KG (ref 275–300)
OSMOLALITY SERPL CALC.SUM OF ELEC: 309.3 MOSMOL/KG (ref 275–300)
PHOSPHATE SERPL-MCNC: 6.4 MG/DL (ref 2.5–4.5)
PLATELET # BLD AUTO: 115 THOU/MM3 (ref 130–400)
PMV BLD AUTO: 9.8 FL (ref 9.4–12.4)
POTASSIUM SERPL-SCNC: 4.8 MEQ/L (ref 3.5–5.2)
POTASSIUM SERPL-SCNC: 5 MEQ/L (ref 3.5–5.2)
PROT SERPL-MCNC: 6.9 G/DL (ref 6.4–8.3)
RBC # BLD AUTO: 2.96 MILL/MM3 (ref 4.7–6.1)
SODIUM SERPL-SCNC: 138 MEQ/L (ref 135–145)
SODIUM SERPL-SCNC: 139 MEQ/L (ref 135–145)
TROPONIN, HIGH SENSITIVITY: 136 NG/L (ref 0–12)
TROPONIN, HIGH SENSITIVITY: 145 NG/L (ref 0–12)
WBC # BLD AUTO: 6.4 THOU/MM3 (ref 4.8–10.8)

## 2025-06-04 PROCEDURE — 6370000000 HC RX 637 (ALT 250 FOR IP)

## 2025-06-04 PROCEDURE — 5A1D70Z PERFORMANCE OF URINARY FILTRATION, INTERMITTENT, LESS THAN 6 HOURS PER DAY: ICD-10-PCS

## 2025-06-04 PROCEDURE — 87081 CULTURE SCREEN ONLY: CPT

## 2025-06-04 PROCEDURE — 6360000002 HC RX W HCPCS

## 2025-06-04 PROCEDURE — 71045 X-RAY EXAM CHEST 1 VIEW: CPT

## 2025-06-04 PROCEDURE — 83735 ASSAY OF MAGNESIUM: CPT

## 2025-06-04 PROCEDURE — 84484 ASSAY OF TROPONIN QUANT: CPT

## 2025-06-04 PROCEDURE — 90935 HEMODIALYSIS ONE EVALUATION: CPT

## 2025-06-04 PROCEDURE — 83690 ASSAY OF LIPASE: CPT

## 2025-06-04 PROCEDURE — 93010 ELECTROCARDIOGRAM REPORT: CPT | Performed by: INTERNAL MEDICINE

## 2025-06-04 PROCEDURE — 83605 ASSAY OF LACTIC ACID: CPT

## 2025-06-04 PROCEDURE — 93005 ELECTROCARDIOGRAM TRACING: CPT | Performed by: STUDENT IN AN ORGANIZED HEALTH CARE EDUCATION/TRAINING PROGRAM

## 2025-06-04 PROCEDURE — 2500000003 HC RX 250 WO HCPCS

## 2025-06-04 PROCEDURE — 84100 ASSAY OF PHOSPHORUS: CPT

## 2025-06-04 PROCEDURE — 93321 DOPPLER ECHO F-UP/LMTD STD: CPT | Performed by: INTERNAL MEDICINE

## 2025-06-04 PROCEDURE — 36415 COLL VENOUS BLD VENIPUNCTURE: CPT

## 2025-06-04 PROCEDURE — 93321 DOPPLER ECHO F-UP/LMTD STD: CPT

## 2025-06-04 PROCEDURE — 99223 1ST HOSP IP/OBS HIGH 75: CPT | Performed by: STUDENT IN AN ORGANIZED HEALTH CARE EDUCATION/TRAINING PROGRAM

## 2025-06-04 PROCEDURE — 85025 COMPLETE CBC W/AUTO DIFF WBC: CPT

## 2025-06-04 PROCEDURE — 82248 BILIRUBIN DIRECT: CPT

## 2025-06-04 PROCEDURE — 93325 DOPPLER ECHO COLOR FLOW MAPG: CPT | Performed by: INTERNAL MEDICINE

## 2025-06-04 PROCEDURE — 6360000004 HC RX CONTRAST MEDICATION

## 2025-06-04 PROCEDURE — 80053 COMPREHEN METABOLIC PANEL: CPT

## 2025-06-04 PROCEDURE — 99222 1ST HOSP IP/OBS MODERATE 55: CPT | Performed by: INTERNAL MEDICINE

## 2025-06-04 PROCEDURE — 93308 TTE F-UP OR LMTD: CPT | Performed by: INTERNAL MEDICINE

## 2025-06-04 PROCEDURE — 83880 ASSAY OF NATRIURETIC PEPTIDE: CPT

## 2025-06-04 PROCEDURE — 99285 EMERGENCY DEPT VISIT HI MDM: CPT

## 2025-06-04 PROCEDURE — 2060000000 HC ICU INTERMEDIATE R&B

## 2025-06-04 RX ORDER — SODIUM CHLORIDE 0.9 % (FLUSH) 0.9 %
5-40 SYRINGE (ML) INJECTION PRN
Status: DISCONTINUED | OUTPATIENT
Start: 2025-06-04 | End: 2025-06-05 | Stop reason: HOSPADM

## 2025-06-04 RX ORDER — HYDRALAZINE HYDROCHLORIDE 50 MG/1
100 TABLET, FILM COATED ORAL EVERY 8 HOURS
Status: DISCONTINUED | OUTPATIENT
Start: 2025-06-04 | End: 2025-06-05 | Stop reason: HOSPADM

## 2025-06-04 RX ORDER — LABETALOL 100 MG/1
100 TABLET, FILM COATED ORAL EVERY 8 HOURS
Status: DISCONTINUED | OUTPATIENT
Start: 2025-06-04 | End: 2025-06-05 | Stop reason: HOSPADM

## 2025-06-04 RX ORDER — SODIUM CHLORIDE 0.9 % (FLUSH) 0.9 %
5-40 SYRINGE (ML) INJECTION EVERY 12 HOURS SCHEDULED
Status: DISCONTINUED | OUTPATIENT
Start: 2025-06-04 | End: 2025-06-05 | Stop reason: HOSPADM

## 2025-06-04 RX ORDER — HYDROXYZINE PAMOATE 25 MG/1
50 CAPSULE ORAL 3 TIMES DAILY PRN
Status: DISCONTINUED | OUTPATIENT
Start: 2025-06-04 | End: 2025-06-05 | Stop reason: HOSPADM

## 2025-06-04 RX ORDER — ATORVASTATIN CALCIUM 40 MG/1
40 TABLET, FILM COATED ORAL DAILY
Status: DISCONTINUED | OUTPATIENT
Start: 2025-06-04 | End: 2025-06-05 | Stop reason: HOSPADM

## 2025-06-04 RX ORDER — ACETAMINOPHEN 325 MG/1
650 TABLET ORAL EVERY 6 HOURS PRN
Status: DISCONTINUED | OUTPATIENT
Start: 2025-06-04 | End: 2025-06-05 | Stop reason: HOSPADM

## 2025-06-04 RX ORDER — ISOSORBIDE MONONITRATE 60 MG/1
120 TABLET, EXTENDED RELEASE ORAL 2 TIMES DAILY
Status: DISCONTINUED | OUTPATIENT
Start: 2025-06-04 | End: 2025-06-05 | Stop reason: HOSPADM

## 2025-06-04 RX ORDER — AMLODIPINE BESYLATE 5 MG/1
5 TABLET ORAL DAILY
Status: DISCONTINUED | OUTPATIENT
Start: 2025-06-04 | End: 2025-06-05 | Stop reason: HOSPADM

## 2025-06-04 RX ORDER — HYDRALAZINE HYDROCHLORIDE 20 MG/ML
10 INJECTION INTRAMUSCULAR; INTRAVENOUS ONCE
Status: COMPLETED | OUTPATIENT
Start: 2025-06-04 | End: 2025-06-04

## 2025-06-04 RX ORDER — AMIODARONE HYDROCHLORIDE 200 MG/1
200 TABLET ORAL DAILY
Status: DISCONTINUED | OUTPATIENT
Start: 2025-06-04 | End: 2025-06-05 | Stop reason: HOSPADM

## 2025-06-04 RX ORDER — ASPIRIN 81 MG/1
81 TABLET ORAL DAILY
Status: DISCONTINUED | OUTPATIENT
Start: 2025-06-04 | End: 2025-06-05 | Stop reason: HOSPADM

## 2025-06-04 RX ORDER — HEPARIN SODIUM 5000 [USP'U]/ML
5000 INJECTION, SOLUTION INTRAVENOUS; SUBCUTANEOUS EVERY 8 HOURS SCHEDULED
Status: DISCONTINUED | OUTPATIENT
Start: 2025-06-04 | End: 2025-06-04

## 2025-06-04 RX ORDER — SODIUM CHLORIDE 9 MG/ML
INJECTION, SOLUTION INTRAVENOUS PRN
Status: DISCONTINUED | OUTPATIENT
Start: 2025-06-04 | End: 2025-06-05 | Stop reason: HOSPADM

## 2025-06-04 RX ORDER — POLYETHYLENE GLYCOL 3350 17 G/17G
17 POWDER, FOR SOLUTION ORAL DAILY PRN
Status: DISCONTINUED | OUTPATIENT
Start: 2025-06-04 | End: 2025-06-05 | Stop reason: HOSPADM

## 2025-06-04 RX ORDER — ONDANSETRON 4 MG/1
4 TABLET, ORALLY DISINTEGRATING ORAL EVERY 8 HOURS PRN
Status: DISCONTINUED | OUTPATIENT
Start: 2025-06-04 | End: 2025-06-05 | Stop reason: HOSPADM

## 2025-06-04 RX ORDER — ONDANSETRON 2 MG/ML
4 INJECTION INTRAMUSCULAR; INTRAVENOUS EVERY 6 HOURS PRN
Status: DISCONTINUED | OUTPATIENT
Start: 2025-06-04 | End: 2025-06-05 | Stop reason: HOSPADM

## 2025-06-04 RX ORDER — ACETAMINOPHEN 650 MG/1
650 SUPPOSITORY RECTAL EVERY 6 HOURS PRN
Status: DISCONTINUED | OUTPATIENT
Start: 2025-06-04 | End: 2025-06-05 | Stop reason: HOSPADM

## 2025-06-04 RX ADMIN — SODIUM CHLORIDE, PRESERVATIVE FREE 10 ML: 5 INJECTION INTRAVENOUS at 13:19

## 2025-06-04 RX ADMIN — SODIUM CHLORIDE, PRESERVATIVE FREE 10 ML: 5 INJECTION INTRAVENOUS at 20:13

## 2025-06-04 RX ADMIN — Medication 3 MG: at 20:11

## 2025-06-04 RX ADMIN — LABETALOL HYDROCHLORIDE 100 MG: 100 TABLET, FILM COATED ORAL at 18:29

## 2025-06-04 RX ADMIN — AMIODARONE HYDROCHLORIDE 200 MG: 200 TABLET ORAL at 13:16

## 2025-06-04 RX ADMIN — ATORVASTATIN CALCIUM 40 MG: 40 TABLET, FILM COATED ORAL at 13:16

## 2025-06-04 RX ADMIN — APIXABAN 2.5 MG: 2.5 TABLET, FILM COATED ORAL at 13:16

## 2025-06-04 RX ADMIN — CLONIDINE HYDROCHLORIDE 0.3 MG: 0.1 TABLET ORAL at 20:10

## 2025-06-04 RX ADMIN — ISOSORBIDE MONONITRATE 120 MG: 60 TABLET, EXTENDED RELEASE ORAL at 20:11

## 2025-06-04 RX ADMIN — APIXABAN 2.5 MG: 2.5 TABLET, FILM COATED ORAL at 20:10

## 2025-06-04 RX ADMIN — ASPIRIN 81 MG: 81 TABLET, COATED ORAL at 13:20

## 2025-06-04 RX ADMIN — HYDRALAZINE HYDROCHLORIDE 10 MG: 20 INJECTION INTRAMUSCULAR; INTRAVENOUS at 06:05

## 2025-06-04 RX ADMIN — AMLODIPINE BESYLATE 5 MG: 5 TABLET ORAL at 16:01

## 2025-06-04 RX ADMIN — SULFUR HEXAFLUORIDE 2 ML: KIT at 14:47

## 2025-06-04 RX ADMIN — ACETAMINOPHEN 650 MG: 325 TABLET ORAL at 10:50

## 2025-06-04 RX ADMIN — HYDRALAZINE HYDROCHLORIDE 100 MG: 50 TABLET ORAL at 23:39

## 2025-06-04 ASSESSMENT — PAIN DESCRIPTION - LOCATION: LOCATION: LEG

## 2025-06-04 ASSESSMENT — PAIN SCALES - GENERAL
PAINLEVEL_OUTOF10: 0
PAINLEVEL_OUTOF10: 7
PAINLEVEL_OUTOF10: 0

## 2025-06-04 ASSESSMENT — PAIN - FUNCTIONAL ASSESSMENT: PAIN_FUNCTIONAL_ASSESSMENT: 0-10

## 2025-06-04 ASSESSMENT — ENCOUNTER SYMPTOMS
APNEA: 0
SHORTNESS OF BREATH: 1
EYES NEGATIVE: 1
WHEEZING: 0
CHOKING: 0
STRIDOR: 1
GASTROINTESTINAL NEGATIVE: 1
CHEST TIGHTNESS: 0
COUGH: 0

## 2025-06-04 NOTE — FLOWSHEET NOTE
06/04/25 0750 06/04/25 1050 06/04/25 1215   Vital Signs   BP (!) 214/100  --  (!) 207/111   Temp 98.2 °F (36.8 °C)  --  98.5 °F (36.9 °C)   Pulse 89  --  91   Respirations 23  --  25   SpO2 97 %  --  97 %   Weight - Scale 99 kg (218 lb 4.1 oz)  --  96.1 kg (211 lb 13.8 oz)   Weight Method Standing scale  --  Standing scale   Percent Weight Change 0.16  --  -2.93   Pain Assessment   Pain Location  --  Leg  --    Patient's Stated Pain Goal  --  10  --    Post-Hemodialysis Assessment   Post-Treatment Procedures  --   --  Blood returned;Access bleeding time < 10 minutes   Machine Disinfection Process  --   --  Acid/Vinegar Clean;Heat Disinfect;Exterior Machine Disinfection   Rinseback Volume (ml)  --   --  400 ml   Blood Volume Processed (Liters)  --   --  104 L   Dialyzer Clearance  --   --  Lightly streaked   Duration of Treatment (minutes)  --   --  230 minutes   Heparin Amount Administered During Treatment (mL)  --   --  0 mL   Hemodialysis Intake (ml)  --   --  900 ml   Hemodialysis Output (ml)  --   --  3800 ml   NET Removed (ml)  --   --  2900   Tolerated Treatment  --   --  Good     3hour 50min tx complete from a 4hour treatment. Patient had severe cramping throughout tx and  only 2.9 liters removed. Dr. Brown updated. 14g needles removed, clean, dry gauze applied and taped securely.  Report called to primary nurse.  Treatment record complete and placed in bin to be scanned to EMR.

## 2025-06-04 NOTE — ED NOTES
ED to inpatient nurses report      Chief Complaint:  Chief Complaint   Patient presents with    Shortness of Breath     Present to ED from: Home    MOA:     LOC: alert and orientated to name, place, date  Mobility: Requires assistance * 1  Oxygen Baseline: 4L NC    Current needs required: 4L NC     Code Status:   Full Code    What abnormal results were found and what did you give/do to treat them? See labs   Any procedures or intervention occur? N/A    Mental Status:  Level of Consciousness: Alert (0)    Psych Assessment:        Vitals:  Patient Vitals for the past 24 hrs:   BP Temp Temp src Pulse Resp SpO2 Height Weight   06/04/25 0433 (!) 194/96 -- -- 83 17 99 % -- --   06/04/25 0217 (!) 163/106 99.1 °F (37.3 °C) Oral 99 26 95 % 1.905 m (6' 3\") 97 kg (213 lb 13.5 oz)        LDAs:   Peripheral IV 06/04/25 Right Antecubital (Active)   Site Assessment Clean, dry & intact 06/04/25 0223   Line Status Normal saline locked 06/04/25 0223   Phlebitis Assessment No symptoms 06/04/25 0223   Infiltration Assessment 0 06/04/25 0223   Alcohol Cap Used Yes 06/04/25 0223   Dressing Status Clean, dry & intact 06/04/25 0223   Dressing Type Transparent 06/04/25 0223       Ambulatory Status:  No data recorded    Diagnosis:  DISPOSITION Admitted 06/04/2025 04:47:12 AM   Final diagnoses:   ESRD on dialysis (HCC)   Acute pulmonary edema (HCC)   Chest pain, unspecified type   Elevated troponin   Prolonged Q-T interval on ECG        Consults:  IP CONSULT TO NEPHROLOGY     Pain Score:  Pain Assessment  Pain Assessment: 0-10  Pain Level: 7    C-SSRS:   Risk of Suicide: No Risk    Sepsis Screening:       Gregoria Fall Risk:       Swallow Screening        Preferred Language:   English      ALLERGIES     Humalog [insulin lispro], Insulin regular human, and Lisinopril    SURGICAL HISTORY       Past Surgical History:   Procedure Laterality Date    ABDOMINAL AORTIC ANEURYSM REPAIR      BRONCHOSCOPY N/A 10/6/2022    BRONCHOSCOPY performed by Theodore

## 2025-06-04 NOTE — CARE COORDINATION
Case Management Assessment Initial Evaluation    Date/Time of Evaluation: 6/4/2025 10:33 AM  Assessment Completed by: Yovany Barfield RN    If patient is discharged prior to next notation, then this note serves as note for discharge by case management.    Patient Name: Trav Jain                   YOB: 1965  Diagnosis: Shortness of breath [R06.02]  Acute pulmonary edema (HCC) [J81.0]  Prolonged Q-T interval on ECG [R94.31]  Elevated troponin [R79.89]  ESRD on dialysis (HCC) [N18.6, Z99.2]  Fluid overload [E87.70]  Chest pain, unspecified type [R07.9]                   Date / Time: 6/4/2025  2:14 AM  Location: [address] [place] [address]     Patient Admission Status: Inpatient   Readmission Risk Low 0-14, Mod 15-19), High > 20: Readmission Risk Score: 39.2    Current PCP: Radha Hou APRN - CNP  Health Care Decision Makers:   Primary Decision Maker: CRYSTAL JAIN - Spouse - [phone]    Additional Case Management Notes:   Fluid Overload/CP (missed HD)  PMH: DM, Cocaine Use  Emergent HD  /s; monitor  Oxygen 6L  Creatinine 15; monitor  Await ECHO  Procedures:   6/4 ECHO Pending  Patient Goals/Plan/Treatment Preferences: plans home alone ( from spouse Crystal), has [facility] oxygen 4L, (owns concentrator), [facility] HD T-R-S 0655 am chair time, [facility] Transport, has insurance RN monthly           06/04/25 1021   Service Assessment   Patient Orientation Alert and Oriented   Cognition Alert   History Provided By Medical Record;Spouse   Primary Caregiver Self   Accompanied By/Relationship none   Support Systems Spouse/Significant Other   Patient's Healthcare Decision Maker is: Legal Next of Kin   PCP Verified by CM Yes   Last Visit to PCP Within last 3 months   Prior Functional Level Independent in ADLs/IADLs   Current Functional Level Independent in ADLs/IADLs   Can patient return to prior living arrangement Yes   Ability to make needs known: Good   Family able to assist with home care needs: Yes

## 2025-06-04 NOTE — ED TRIAGE NOTES
Pt to ED via EMS c/o shortness of breath. Pt states he missed his dialysis appointment yesterday and has been feeling SOB since. Pt states he wears 4L NC at home. On arrival pt diaphoretic and working hard to breath. Pt A&O. EKG and labs collected

## 2025-06-04 NOTE — ED PROVIDER NOTES
[facility] EMERGENCY DEPARTMENT     Pt Name: Trav Jennings  MRN: [id number]  Birthdate 1965  Date of evaluation: 6/4/2025  Physician: Bola Roque MD      Note to patient: The 21st Century Cures Act requires that medical notes like this one to be available to patients in the interest of transparency. Please be advised, this is a medical document. It is intended as [id number]-qvlg communication. It is written in medical language and may contain abbreviations and verbiage that may be unfamiliar. It may appear to read blunt or direct. Medical documents are intended to carry relevant medical information, facts as evident and the clinical opinion of the practitioner.     CHIEF COMPLAINT       Chief Complaint   Patient presents with    Shortness of Breath       HISTORY OF PRESENT ILLNESS   Trav Jennings is a 60 y.o. male with PMHx of ESRD on HD T/TR/sat  who presents to the emergency department for evaluation of shortness of breath.  Patient reports that he is on 4 L nasal cannula at baseline.  He reports that he has been having argument with his wife that he is been locked out of the house.  He states he has not been able to take any of his medications since Saturday which was also his last dialysis session.  He also reports that he has not been able to wear his oxygen.  EMS reports patient was found to be hypoxic without his oxygen on.  Placed on 4 L.  Patient reports worsening leg swelling.  States he has been having chest pain persistently for the past 3 days  The patient has no other acute complaints at this time.    A 10-point ROS was performed and negative unless otherwise stated in HPI  PASTMEDICAL HISTORY     Past Medical History:   Diagnosis Date    AAA (abdominal aortic aneurysm)     NURIS (acute kidney injury) 9/24/2015    Anemia associated with chronic renal failure     Arthritis     stated in hands    Cocaine abuse (HCC) 5/10/2014    Depression     Diabetes mellitus (HCC)     pt states he no longer has

## 2025-06-05 VITALS
HEART RATE: 79 BPM | OXYGEN SATURATION: 100 % | BODY MASS INDEX: 25.85 KG/M2 | TEMPERATURE: 98.3 F | HEIGHT: 75 IN | WEIGHT: 207.89 LBS | RESPIRATION RATE: 18 BRPM | DIASTOLIC BLOOD PRESSURE: 59 MMHG | SYSTOLIC BLOOD PRESSURE: 126 MMHG

## 2025-06-05 LAB
ANION GAP SERPL CALC-SCNC: 13 MEQ/L (ref 8–16)
ANION GAP SERPL CALC-SCNC: 13 MEQ/L (ref 8–16)
BASOPHILS ABSOLUTE: 0 THOU/MM3 (ref 0–0.1)
BASOPHILS NFR BLD AUTO: 0.4 %
BUN SERPL-MCNC: 24 MG/DL (ref 8–23)
BUN SERPL-MCNC: 55 MG/DL (ref 8–23)
CALCIUM SERPL-MCNC: 8.5 MG/DL (ref 8.6–10)
CALCIUM SERPL-MCNC: 8.5 MG/DL (ref 8.6–10)
CHLORIDE SERPL-SCNC: 100 MEQ/L (ref 98–111)
CHLORIDE SERPL-SCNC: 102 MEQ/L (ref 98–111)
CO2 SERPL-SCNC: 25 MEQ/L (ref 22–29)
CO2 SERPL-SCNC: 26 MEQ/L (ref 22–29)
CREAT SERPL-MCNC: 10.1 MG/DL (ref 0.7–1.2)
CREAT SERPL-MCNC: 5.7 MG/DL (ref 0.7–1.2)
DEPRECATED RDW RBC AUTO: 56.4 FL (ref 35–45)
EOSINOPHIL NFR BLD AUTO: 2.5 %
EOSINOPHILS ABSOLUTE: 0.1 THOU/MM3 (ref 0–0.4)
ERYTHROCYTE [DISTWIDTH] IN BLOOD BY AUTOMATED COUNT: 14.9 % (ref 11.5–14.5)
GFR SERPL CREATININE-BSD FRML MDRD: 11 ML/MIN/1.73M2
GFR SERPL CREATININE-BSD FRML MDRD: 5 ML/MIN/1.73M2
GLUCOSE SERPL-MCNC: 123 MG/DL (ref 74–109)
GLUCOSE SERPL-MCNC: 144 MG/DL (ref 74–109)
HCT VFR BLD AUTO: 29.6 % (ref 42–52)
HGB BLD-MCNC: 9.1 GM/DL (ref 14–18)
IMM GRANULOCYTES # BLD AUTO: 0.01 THOU/MM3 (ref 0–0.07)
IMM GRANULOCYTES NFR BLD AUTO: 0.2 %
LYMPHOCYTES ABSOLUTE: 1.1 THOU/MM3 (ref 1–4.8)
LYMPHOCYTES NFR BLD AUTO: 20.6 %
MCH RBC QN AUTO: 31.7 PG (ref 26–33)
MCHC RBC AUTO-ENTMCNC: 30.7 GM/DL (ref 32.2–35.5)
MCV RBC AUTO: 103.1 FL (ref 80–94)
MONOCYTES ABSOLUTE: 0.6 THOU/MM3 (ref 0.4–1.3)
MONOCYTES NFR BLD AUTO: 11 %
MRSA SPEC QL CULT: NORMAL
NEUTROPHILS ABSOLUTE: 3.4 THOU/MM3 (ref 1.8–7.7)
NEUTROPHILS NFR BLD AUTO: 65.3 %
NRBC BLD AUTO-RTO: 0 /100 WBC
PLATELET # BLD AUTO: 104 THOU/MM3 (ref 130–400)
PMV BLD AUTO: 10.7 FL (ref 9.4–12.4)
POTASSIUM SERPL-SCNC: 4.3 MEQ/L (ref 3.5–5.2)
POTASSIUM SERPL-SCNC: 5.3 MEQ/L (ref 3.5–5.2)
RBC # BLD AUTO: 2.87 MILL/MM3 (ref 4.7–6.1)
SODIUM SERPL-SCNC: 139 MEQ/L (ref 135–145)
SODIUM SERPL-SCNC: 140 MEQ/L (ref 135–145)
WBC # BLD AUTO: 5.2 THOU/MM3 (ref 4.8–10.8)

## 2025-06-05 PROCEDURE — 90935 HEMODIALYSIS ONE EVALUATION: CPT

## 2025-06-05 PROCEDURE — 2500000003 HC RX 250 WO HCPCS

## 2025-06-05 PROCEDURE — 36415 COLL VENOUS BLD VENIPUNCTURE: CPT

## 2025-06-05 PROCEDURE — 6370000000 HC RX 637 (ALT 250 FOR IP)

## 2025-06-05 PROCEDURE — 99232 SBSQ HOSP IP/OBS MODERATE 35: CPT | Performed by: INTERNAL MEDICINE

## 2025-06-05 PROCEDURE — 80048 BASIC METABOLIC PNL TOTAL CA: CPT

## 2025-06-05 PROCEDURE — 85025 COMPLETE CBC W/AUTO DIFF WBC: CPT

## 2025-06-05 RX ADMIN — AMLODIPINE BESYLATE 5 MG: 5 TABLET ORAL at 12:47

## 2025-06-05 RX ADMIN — ASPIRIN 81 MG: 81 TABLET, COATED ORAL at 12:46

## 2025-06-05 RX ADMIN — AMIODARONE HYDROCHLORIDE 200 MG: 200 TABLET ORAL at 12:48

## 2025-06-05 RX ADMIN — LABETALOL HYDROCHLORIDE 100 MG: 100 TABLET, FILM COATED ORAL at 14:11

## 2025-06-05 RX ADMIN — HYDRALAZINE HYDROCHLORIDE 100 MG: 50 TABLET ORAL at 12:46

## 2025-06-05 RX ADMIN — ISOSORBIDE MONONITRATE 120 MG: 60 TABLET, EXTENDED RELEASE ORAL at 12:46

## 2025-06-05 RX ADMIN — ATORVASTATIN CALCIUM 40 MG: 40 TABLET, FILM COATED ORAL at 12:48

## 2025-06-05 RX ADMIN — CLONIDINE HYDROCHLORIDE 0.3 MG: 0.1 TABLET ORAL at 12:47

## 2025-06-05 RX ADMIN — SODIUM CHLORIDE, PRESERVATIVE FREE 10 ML: 5 INJECTION INTRAVENOUS at 12:48

## 2025-06-05 RX ADMIN — APIXABAN 2.5 MG: 2.5 TABLET, FILM COATED ORAL at 12:49

## 2025-06-05 RX ADMIN — LABETALOL HYDROCHLORIDE 100 MG: 100 TABLET, FILM COATED ORAL at 03:15

## 2025-06-05 ASSESSMENT — PAIN SCALES - GENERAL: PAINLEVEL_OUTOF10: 0

## 2025-06-05 NOTE — PLAN OF CARE
Problem: Chronic Conditions and Co-morbidities  Goal: Patient's chronic conditions and co-morbidity symptoms are monitored and maintained or improved  Outcome: Progressing     Problem: Discharge Planning  Goal: Discharge to home or other facility with appropriate resources  Outcome: Progressing     Problem: Pain  Goal: Verbalizes/displays adequate comfort level or baseline comfort level  Outcome: Progressing     Problem: Respiratory - Adult  Goal: Achieves optimal ventilation and oxygenation  Outcome: Progressing     Problem: Cardiovascular - Adult  Goal: Maintains optimal cardiac output and hemodynamic stability  Outcome: Progressing  Goal: Absence of cardiac dysrhythmias or at baseline  Outcome: Progressing     Problem: Hematologic - Adult  Goal: Maintains hematologic stability  Outcome: Progressing     Problem: Safety - Adult  Goal: Free from fall injury  Outcome: Progressing     Problem: Risk for Elopement  Goal: Patient will not exit the unit/facility without proper excort  Outcome: Progressing   Care plan reviewed with patient.  Patient verbalized understanding of the plan of care and contributed to goal setting.

## 2025-06-05 NOTE — PLAN OF CARE
Problem: Chronic Conditions and Co-morbidities  Goal: Patient's chronic conditions and co-morbidity symptoms are monitored and maintained or improved  6/5/2025 1252 by Melissa Santizo RN  Outcome: Progressing  6/5/2025 0104 by Airam Kaur RN  Outcome: Progressing     Problem: Discharge Planning  Goal: Discharge to home or other facility with appropriate resources  6/5/2025 1252 by Melissa Santizo RN  Outcome: Progressing  6/5/2025 0104 by Airam Kaur RN  Outcome: Progressing     Problem: Pain  Goal: Verbalizes/displays adequate comfort level or baseline comfort level  6/5/2025 1252 by Melissa Santizo RN  Outcome: Progressing  6/5/2025 0104 by Airam Kaur RN  Outcome: Progressing     Problem: Respiratory - Adult  Goal: Achieves optimal ventilation and oxygenation  6/5/2025 1252 by Melissa Santizo RN  Outcome: Progressing  6/5/2025 0104 by Airam Kaur RN  Outcome: Progressing     Problem: Cardiovascular - Adult  Goal: Maintains optimal cardiac output and hemodynamic stability  6/5/2025 1252 by Melissa Santizo RN  Outcome: Progressing  6/5/2025 0104 by Airam Kaur RN  Outcome: Progressing  Goal: Absence of cardiac dysrhythmias or at baseline  6/5/2025 1252 by Melissa Santizo RN  Outcome: Progressing  6/5/2025 0104 by Airam Kaur RN  Outcome: Progressing     Problem: Hematologic - Adult  Goal: Maintains hematologic stability  6/5/2025 1252 by Melissa Santizo RN  Outcome: Progressing  6/5/2025 0104 by Airam Kaur RN  Outcome: Progressing     Problem: Safety - Adult  Goal: Free from fall injury  6/5/2025 1252 by Melissa Santizo RN  Outcome: Progressing  6/5/2025 0104 by Airam Kaur RN  Outcome: Progressing     Problem: Risk for Elopement  Goal: Patient will not exit the unit/facility without proper excort  6/5/2025 1252 by Melissa Santizo RN  Outcome: Progressing  6/5/2025 0104 by Airam Kaur RN  Outcome: Progressing

## 2025-06-05 NOTE — PROGRESS NOTES
Discharged to home. [facility]. Call to [facility]. IV removed. Pt refused clonidine and holding hydralzaine for BP at normal level. Pt states he is lower than he usually is and does not want to take anything else at this time. Pt has stablilized and is ready to be discharged. Review of discharge paperwork. No med changes and pt to return to dialysis on his scheduled day.

## 2025-06-05 NOTE — FLOWSHEET NOTE
06/05/25 1200   Vital Signs   BP (!) 166/103   Temp 97.6 °F (36.4 °C)   Pulse 85   Respirations 18   Weight - Scale 94.3 kg (207 lb 14.3 oz)   Weight Method Standing scale   Percent Weight Change -2.08   Post-Hemodialysis Assessment   Post-Treatment Procedures Blood returned;Access bleeding time < 10 minutes   Machine Disinfection Process Acid/Vinegar Clean;Heat Disinfect;Exterior Machine Disinfection   Rinseback Volume (ml) 400 ml   Blood Volume Processed (Liters) 83.5 L   Dialyzer Clearance Lightly streaked   Duration of Treatment (minutes) 240 minutes   Heparin Amount Administered During Treatment (mL) 0 mL   Hemodialysis Intake (ml) 500 ml   Hemodialysis Output (ml) 2500 ml   NET Removed (ml) 2000     Stable 4 hour treatment complete. 2000ml removed and tolerated well. Patient left over DW but due to severe cramping patient thinks he has gained weight. Access held x 10min each and bruit and thrill present after. Access dressing clean, dry and intact. Report called to primary RN. Charting printed and placed in the bin and scanned into EMR.

## 2025-06-05 NOTE — PROGRESS NOTES
Pt no longer discharged as he is having dizzy spells and meds need adjusted and discussions with Dr Brown tomorrow. Pt is non compliant with meds and after receiving usual doses today, he is unable to tolerate them. Pt is willing to stay and cancel [facility] cab. Pt resting in bed. Last  sys

## 2025-06-05 NOTE — PROGRESS NOTES
Spiritual Health History and Assessment/Progress Note  [facility]    Initial Encounter, Spiritual/Emotional Needs,  ,  ,      Name: Trav Jennings MRN: [id number]    Age: 60 y.o.     Sex: male   Language: English   Sikhism: Restoration   Fluid overload     Date: 6/5/2025            Total Time Calculated: (P) 10 min              Spiritual Assessment began in [facility] ICU STEPDOWN TELEMETRY 4K        Referral/Consult From: Patient   Encounter Overview/Reason: Initial Encounter, Spiritual/Emotional Needs  Service Provided For: Patient    Elizabeth, Belief, Meaning:   Patient identifies as spiritual  Family/Friends identify as spiritual      Importance and Influence:  Patient has spiritual/personal beliefs that influence decisions regarding their health  Family/Friends have spiritual/personal beliefs that influence decisions regarding the patient's health    Community:  Patient feels well-supported. Support system includes: Spouse/Partner  Family/Friends feel well-supported. Support system includes: Spouse/Partner    Assessment and Plan of Care:   In my encounter with the 60 yr old patient, while rounding  the unit 4K,  I provided spiritual care to patient through conversation, I also came to assess the patient's spiritual needs present.     I provided prayer, emotional support and words of comfort.  provided a listening presence and encouraged pt to share their beliefs and how I can support them during their hospitalization.      Patient Interventions include: Facilitated expression of thoughts and feelings  Family/Friends Interventions include: Facilitated expression of thoughts and feelings    Patient Plan of Care: Spiritual Care available upon further referral  Family/Friends Plan of Care: Spiritual Care available upon further referral    Electronically signed by FLAVIA Arambula on 6/5/2025 at 3:41 PM

## 2025-06-05 NOTE — DISCHARGE SUMMARY
Pt will be discharged after all. Dr Villasenor discussed need for f/u and review of meds with Dr Brown and feels pt is stable enough to leave. Discussed pt's complaints of dizzy spells and Pt did show signs of improvement with sys Bps elevating to 130sys. Pt is agreeable. [facility] arranged and pt will be leaving by 1945.

## 2025-06-05 NOTE — PROGRESS NOTES
[facility]   Nephrology progress note  6/5/2025, 9:25 AM      Pt Name:    Trav Jennings  MRN:     [id number]     YOB: 1965  Admit Date:    6/4/2025  2:14 AM  Primary Care Physician:  Radha Hou APRN - CNP   Room number  4K-14/014-A    Chief Complaint: Nephrology following for NURIS/CKD    60M PMHx ESRD (T/T/S w [facility]), HTN, noncompliance presenting for worsening SOB 2/2 missed dialysis appointment. Receiving dialysis IP with symptomatic improvement.     Subjective:  Patient seen and examined  No chest pain or shortness of breath  Notes he had some muscle cramps yesterday, wants to do 2L off today.   Feels okay otherwise  Discussed with HD.     Objective:  24HR INTAKE/OUTPUT:    Intake/Output Summary (Last 24 hours) at 6/5/2025 0925  Last data filed at 6/4/2025 1215  Gross per 24 hour   Intake 900 ml   Output 3800 ml   Net -2900 ml     I/O last 3 completed shifts:  In: 900   Out: 3800   No intake/output data recorded.  Admission weight: 97 kg (213 lb 13.5 oz)  Wt Readings from Last 3 Encounters:   06/05/25 96.3 kg (212 lb 4.9 oz)   05/01/25 93.4 kg (205 lb 14.6 oz)   04/08/25 96.9 kg (213 lb 9.6 oz)     Body mass index is 26.54 kg/m².    Physical examination  VITALS:     Vitals:    06/04/25 2333 06/05/25 0020 06/05/25 0309 06/05/25 0730   BP: 137/71  135/62 (!) 147/67   Pulse: 72  75 78   Resp: 16  16 14   Temp: 97.7 °F (36.5 °C)  97.6 °F (36.4 °C) 97.8 °F (36.6 °C)   TempSrc: Oral  Oral    SpO2: 100%  100% 97%   Weight:  96.1 kg (211 lb 14.4 oz)  96.3 kg (212 lb 4.9 oz)   Height:         General Appearance: alert and cooperative with exam, appears comfortable, no distress  Mouth/Throat: Oral mucosa moist  Neck: No JVD  Lungs: Air entry B/L, no rales, no use of accessory muscles  Heart:  S1, S2 heard  GI: soft, non-tender, no guarding  Extremities: Improving LE edema    Lab Data  CBC:   Recent Labs     06/04/25  0218 06/05/25  0456   WBC 6.4 5.2   HGB 9.4* 9.1*   HCT 30.2* 29.6*

## 2025-06-06 NOTE — DISCHARGE SUMMARY
Resident Discharge Summary (Hospitalist)      Patient: Trav Jennings 60 y.o. male  : 1965  MRN: [id number]   Account: [id number]   Patient's PCP: Radha Hou APRN - CNP    Admit Date: 2025   Discharge Date: 2025      Admitting Physician: No admitting provider for patient encounter.  Discharge Physician: David Villasenor,        Discharge Diagnoses:  Fluid overload secondary to ESRD: Patient presented noted to be hypertensive, had emergent dialysis session done on 2025, patient under general distress and done on 2025.  Patient blood pressure/electrolyte abnormalities resolved after dialysis.  Hypertensive urgency on presentation: Patient was restarted on home medications and patient blood pressure was under control.  Patient noted to have a episode of hypotension and systolic of 100 after second dialysis session, during which the patient noted to have some mild lightheadedness/dizziness.  After couple of hours patient blood pressure improved since systolic of 130s to 140s, patient was feeling better.  Patient was stable for discharge.  Patient advised to follow-up with nephrology for further clonidine patch rather than clonidine p.o. medication as patient has trouble with medication compliance.   Other chronic conditions were managed as per home dose medications.      Hospital Course:   Trav Jennings is a 60 y.o. male with PMHx HFpEF, ESRD on dialysis TTS, paroxysmal A-fib, HLD, chronic microcytic anemia, MDD, history of polysubstance abuse, who was admitted to [facility] on 2025 for fluid overloaded secondary to missed dialysis session on Tuesday.  Patient had emergent dialysis done on Wednesday, 2025.  Patient had another dialysis session performed on 2025, Thursday.  Patient noted to have some electrolyte abnormalities on presentation which were corrected after dialysis.  Patient also did not have hypertensive urgency systolic blood pressures in 200,

## 2025-06-21 ENCOUNTER — HOSPITAL ENCOUNTER (EMERGENCY)
Age: 60
Discharge: HOME | End: 2025-06-21
Attending: EMERGENCY MEDICINE
Payer: MEDICARE

## 2025-06-21 ENCOUNTER — APPOINTMENT (OUTPATIENT)
Dept: GENERAL RADIOLOGY | Age: 60
End: 2025-06-21
Payer: MEDICARE

## 2025-06-21 VITALS
OXYGEN SATURATION: 92 % | HEIGHT: 75 IN | HEART RATE: 86 BPM | TEMPERATURE: 98.9 F | DIASTOLIC BLOOD PRESSURE: 88 MMHG | WEIGHT: 205.47 LBS | BODY MASS INDEX: 25.55 KG/M2 | RESPIRATION RATE: 18 BRPM | SYSTOLIC BLOOD PRESSURE: 189 MMHG

## 2025-06-21 DIAGNOSIS — R07.9 CHEST PAIN, UNSPECIFIED TYPE: ICD-10-CM

## 2025-06-21 DIAGNOSIS — N18.6 ESRD (END STAGE RENAL DISEASE) (HCC): Primary | ICD-10-CM

## 2025-06-21 LAB
ALBUMIN SERPL BCG-MCNC: 3.9 G/DL (ref 3.4–4.9)
ALP SERPL-CCNC: 97 U/L (ref 40–129)
ALT SERPL W/O P-5'-P-CCNC: 9 U/L (ref 10–50)
ANION GAP SERPL CALC-SCNC: 18 MEQ/L (ref 8–16)
AST SERPL-CCNC: 30 U/L (ref 10–50)
BASE EXCESS BLDA CALC-SCNC: -1.4 MMOL/L (ref -2–3)
BASOPHILS ABSOLUTE: 0 THOU/MM3 (ref 0–0.1)
BASOPHILS NFR BLD AUTO: 0.5 %
BILIRUB SERPL-MCNC: 0.6 MG/DL (ref 0.3–1.2)
BUN SERPL-MCNC: 81 MG/DL (ref 8–23)
CA-I BLD ISE-SCNC: 1.11 MMOL/L (ref 1.12–1.32)
CALCIUM SERPL-MCNC: 8.9 MG/DL (ref 8.6–10)
CHLORIDE BLD-SCNC: 108 MEQ/L (ref 98–109)
CHLORIDE SERPL-SCNC: 101 MEQ/L (ref 98–111)
CO2 SERPL-SCNC: 21 MEQ/L (ref 22–29)
COLLECTED BY:: NORMAL
CREAT SERPL-MCNC: 13.8 MG/DL (ref 0.7–1.2)
DEPRECATED RDW RBC AUTO: 56.8 FL (ref 35–45)
DEVICE: NORMAL
EKG ATRIAL RATE: 85 BPM
EKG P AXIS: 62 DEGREES
EKG P-R INTERVAL: 226 MS
EKG Q-T INTERVAL: 430 MS
EKG QRS DURATION: 108 MS
EKG QTC CALCULATION (BAZETT): 511 MS
EKG R AXIS: -43 DEGREES
EKG T AXIS: 86 DEGREES
EKG VENTRICULAR RATE: 85 BPM
EOSINOPHIL NFR BLD AUTO: 1.5 %
EOSINOPHILS ABSOLUTE: 0.1 THOU/MM3 (ref 0–0.4)
ERYTHROCYTE [DISTWIDTH] IN BLOOD BY AUTOMATED COUNT: 14.7 % (ref 11.5–14.5)
GFR SERPL CREATININE-BSD FRML MDRD: 4 ML/MIN/1.73M2
GLUCOSE BLD-MCNC: 90 MG/DL (ref 70–108)
GLUCOSE SERPL-MCNC: 105 MG/DL (ref 74–109)
HCO3 BLDA-SCNC: 25 MMOL/L (ref 23–28)
HCT VFR BLD AUTO: 34.9 % (ref 42–52)
HGB BLD-MCNC: 10.8 GM/DL (ref 14–18)
IMM GRANULOCYTES # BLD AUTO: 0.02 THOU/MM3 (ref 0–0.07)
IMM GRANULOCYTES NFR BLD AUTO: 0.3 %
LACTATE BLD-SCNC: 0.9 MMOL/L (ref 0.5–1.9)
LYMPHOCYTES ABSOLUTE: 1.1 THOU/MM3 (ref 1–4.8)
LYMPHOCYTES NFR BLD AUTO: 16.2 %
MCH RBC QN AUTO: 32.2 PG (ref 26–33)
MCHC RBC AUTO-ENTMCNC: 30.9 GM/DL (ref 32.2–35.5)
MCV RBC AUTO: 104.2 FL (ref 80–94)
MONOCYTES ABSOLUTE: 0.5 THOU/MM3 (ref 0.4–1.3)
MONOCYTES NFR BLD AUTO: 7.8 %
NEUTROPHILS ABSOLUTE: 4.8 THOU/MM3 (ref 1.8–7.7)
NEUTROPHILS NFR BLD AUTO: 73.7 %
NRBC BLD AUTO-RTO: 0 /100 WBC
OSMOLALITY SERPL CALC.SUM OF ELEC: 304.2 MOSMOL/KG (ref 275–300)
PCO2 TEMP ADJ BLDMV: 49 MMHG (ref 41–51)
PH BLDMV: 7.32 [PH] (ref 7.31–7.41)
PLATELET # BLD AUTO: 122 THOU/MM3 (ref 130–400)
PMV BLD AUTO: 10.3 FL (ref 9.4–12.4)
PO2 BLDMV: 35 MMHG (ref 25–40)
POC CREATININE WHOLE BLOOD: > 15 MG/DL (ref 0.5–1.2)
POTASSIUM BLD-SCNC: 5.3 MEQ/L (ref 3.5–4.9)
POTASSIUM SERPL-SCNC: 5.8 MEQ/L (ref 3.5–5.2)
PROT SERPL-MCNC: 7.4 G/DL (ref 6.4–8.3)
RBC # BLD AUTO: 3.35 MILL/MM3 (ref 4.7–6.1)
SAO2 % BLDMV: 62 %
SITE: NORMAL
SODIUM BLD-SCNC: 139 MEQ/L (ref 138–146)
SODIUM SERPL-SCNC: 140 MEQ/L (ref 135–145)
TROPONIN, HIGH SENSITIVITY: 127 NG/L (ref 0–12)
VENTILATION MODE VENT: NORMAL
WBC # BLD AUTO: 6.5 THOU/MM3 (ref 4.8–10.8)

## 2025-06-21 PROCEDURE — 84484 ASSAY OF TROPONIN QUANT: CPT

## 2025-06-21 PROCEDURE — 36415 COLL VENOUS BLD VENIPUNCTURE: CPT

## 2025-06-21 PROCEDURE — 84295 ASSAY OF SERUM SODIUM: CPT

## 2025-06-21 PROCEDURE — 96374 THER/PROPH/DIAG INJ IV PUSH: CPT

## 2025-06-21 PROCEDURE — 80053 COMPREHEN METABOLIC PANEL: CPT

## 2025-06-21 PROCEDURE — 93010 ELECTROCARDIOGRAM REPORT: CPT | Performed by: INTERNAL MEDICINE

## 2025-06-21 PROCEDURE — 82330 ASSAY OF CALCIUM: CPT

## 2025-06-21 PROCEDURE — 82947 ASSAY GLUCOSE BLOOD QUANT: CPT

## 2025-06-21 PROCEDURE — 99285 EMERGENCY DEPT VISIT HI MDM: CPT

## 2025-06-21 PROCEDURE — 85025 COMPLETE CBC W/AUTO DIFF WBC: CPT

## 2025-06-21 PROCEDURE — 6360000002 HC RX W HCPCS: Performed by: EMERGENCY MEDICINE

## 2025-06-21 PROCEDURE — 82435 ASSAY OF BLOOD CHLORIDE: CPT

## 2025-06-21 PROCEDURE — 84132 ASSAY OF SERUM POTASSIUM: CPT

## 2025-06-21 PROCEDURE — 82565 ASSAY OF CREATININE: CPT

## 2025-06-21 PROCEDURE — 90935 HEMODIALYSIS ONE EVALUATION: CPT

## 2025-06-21 PROCEDURE — 93005 ELECTROCARDIOGRAM TRACING: CPT | Performed by: EMERGENCY MEDICINE

## 2025-06-21 PROCEDURE — 83605 ASSAY OF LACTIC ACID: CPT

## 2025-06-21 PROCEDURE — 71045 X-RAY EXAM CHEST 1 VIEW: CPT

## 2025-06-21 PROCEDURE — 82803 BLOOD GASES ANY COMBINATION: CPT

## 2025-06-21 RX ORDER — CALCIUM GLUCONATE 98 MG/ML
1000 INJECTION, SOLUTION INTRAVENOUS ONCE
Status: COMPLETED | OUTPATIENT
Start: 2025-06-21 | End: 2025-06-21

## 2025-06-21 RX ADMIN — CALCIUM GLUCONATE 1000 MG: 98 INJECTION INTRAVENOUS at 07:12

## 2025-06-21 ASSESSMENT — PAIN SCALES - GENERAL: PAINLEVEL_OUTOF10: 8

## 2025-06-21 ASSESSMENT — PAIN - FUNCTIONAL ASSESSMENT: PAIN_FUNCTIONAL_ASSESSMENT: 0-10

## 2025-06-21 NOTE — FLOWSHEET NOTE
Stable 4 hour TX completed. Removed 3.5 L of fluid, tolerated well. Pressure held to each needle site x 10 min. Drsg clean, dry, and intact upon leaving unit. TX record printed for scanning into EMR. Report called to primary RN.   06/21/25 0900 06/21/25 1317   Vital Signs   BP (!) 206/109 (!) 188/97   Temp 99.3 °F (37.4 °C) 98.9 °F (37.2 °C)   Pulse 86 86   Respirations 16 18   SpO2 98 % 99 %   Weight - Scale 96.7 kg (213 lb 3 oz) 93.2 kg (205 lb 7.5 oz)   Weight Method Standing scale  --    Percent Weight Change -3.3 -3.62   Post-Hemodialysis Assessment   Post-Treatment Procedures  --  Blood returned;Access bleeding time < 10 minutes   Machine Disinfection Process  --  Acid/Vinegar Clean;Heat Disinfect;Exterior Machine Disinfection   Blood Volume Processed (Liters)  --  101.8 L   Dialyzer Clearance  --  Lightly streaked   Duration of Treatment (minutes)  --  240 minutes   Heparin Amount Administered During Treatment (mL)  --  0 mL   Hemodialysis Intake (ml)  --  400 ml   Hemodialysis Output (ml)  --  3900 ml   NET Removed (ml)  --  3500   Tolerated Treatment  --  Good

## 2025-06-21 NOTE — ED NOTES
Report from IP dialysis. States they removed 3.5L from him to bring him back to his dry weight. States ending BP was 188/98

## 2025-06-21 NOTE — ED TRIAGE NOTES
Pt arrives to ED from home for c/o SOB and BLE edema. Pt states he gets dialysis every Tuesday, Thursday, and Saturday. Pt states he and his wife recently  and he has not had access to his meds or home oxygen due to everything is with her and she will not give him access to it. Pt states he also missed dialysis on Thursday due to these issues. Pt states he has dialysis at 07:00 this morning but feels he is too sick to go.

## 2025-06-21 NOTE — ED PROVIDER NOTES
[facility] EMERGENCY DEPARTMENT      EMERGENCY MEDICINE     Pt Name: Trav Jennings  MRN: [id number]  Birthdate 1965  Date of evaluation: 6/21/2025  Provider: YAN MARTINES DO, FACEP    CHIEF COMPLAINT       Chief Complaint   Patient presents with    Shortness of Breath    Leg Swelling     HISTORY OF PRESENT ILLNESS   Trav Jennings is a pleasant 60 y.o. male who presents to the emergency department for evaluation of shortness of, chest pain, and leg swelling.  The patient initially came in for a quick medication refill, stating he has dialysis scheduled in 20 minutes.  However in the course of his evaluation, states he missed his last dialysis on Thursday, and has not been taking any of his medications or using his home oxygen since that day either.  Apparently, he is in a disagreement with his wife and she locked up all his medications and oxygen and he has not been able to have access to these.  He has had shortness of breath and chest pain for the last several days, feels quite swollen and fluid overloaded.  No fevers, no other symptoms.  Record review reveals an extensive medical and social history.    PASTMEDICAL HISTORY/Co-Morbid Conditions:     Past Medical History:   Diagnosis Date    AAA (abdominal aortic aneurysm)     NURIS (acute kidney injury) 9/24/2015    Anemia associated with chronic renal failure     Arthritis     stated in hands    Cocaine abuse (HCC) 5/10/2014    Depression     Diabetes mellitus (HCC)     pt states he no longer has diabetes he has lost alot of davion.     FSGS (focal segmental glomerulosclerosis) 5/23/2013    Hemodialysis patient 10/17/2016    on hemodialysis with [facility]    Hemorrhoids 1/16/2012    History of blood transfusion     Hyperlipidemia     Hyperphosphatemia 5/21/2016    Hypertension     Left renal artery stenosis 5/22/2014    Monoclonal (M) protein disease, multiple 'M' protein     Nicotine dependence 6/16/2014    Noncompliance

## 2025-06-25 NOTE — ED PROVIDER NOTES
Transfer of Care Note:   I have personally performed a face to face diagnostic evaluation on this patient. I have personally performed a face to face diagnostic evaluation on this patient. The patient's initial evaluation and plan have been discussed with the prior provider who initially evaluated the patient. Nursing Notes, Past Medical Hx, Past Surgical Hx, Social Hx, Allergies, and Family Hx were all reviewed.    (Please note that portions of this note were completed with a voice recognition program.  Efforts were made to edit the dictations but occasionally words are mis-transcribed.)    7:08 PM EDT: The patient was evaluated.     Trav Jennings is a 60 y.o. male who presents to the Emergency Department for the evaluation of chest pain, need for dialysis, oxygen, medications.      Exam: Refer to Dr. Melendez's note    EKG: All EKG's are interpreted by the Emergency Department Physician who either signs or Co-signs this chart in the absence of a cardiologist.      RADIOLOGY: non-plain film images(s) such as CT, Ultrasound and MRI are read by the radiologist.  XR CHEST PORTABLE   Final Result   1. Mild increased opacification of both lungs that likely reflect pulmonary   congestion the pulmonary edema.   2. Small bilateral pleural effusions.               **This report has been created using voice recognition software.  It may contain   minor errors which are inherent in voice recognition technology.**      Electronically signed by Dr. Odalys Sánchez          ED LABS:  Labs Reviewed   CBC WITH AUTO DIFFERENTIAL - Abnormal; Notable for the following components:       Result Value    RBC 3.35 (*)     Hemoglobin 10.8 (*)     Hematocrit 34.9 (*)     .2 (*)     MCHC 30.9 (*)     RDW-CV 14.7 (*)     RDW-SD 56.8 (*)     Platelets 122 (*)     All other components within normal limits   COMPREHENSIVE METABOLIC PANEL - Abnormal; Notable for the following components:    Creatinine 13.8 (*)     BUN 81 (*)     Potassium

## 2025-07-10 ENCOUNTER — APPOINTMENT (OUTPATIENT)
Dept: CT IMAGING | Age: 60
End: 2025-07-10
Payer: MEDICARE

## 2025-07-10 ENCOUNTER — APPOINTMENT (OUTPATIENT)
Dept: GENERAL RADIOLOGY | Age: 60
End: 2025-07-10
Payer: MEDICARE

## 2025-07-10 ENCOUNTER — HOSPITAL ENCOUNTER (OUTPATIENT)
Age: 60
Setting detail: OBSERVATION
Discharge: HOME | End: 2025-07-12
Attending: STUDENT IN AN ORGANIZED HEALTH CARE EDUCATION/TRAINING PROGRAM | Admitting: STUDENT IN AN ORGANIZED HEALTH CARE EDUCATION/TRAINING PROGRAM
Payer: MEDICARE

## 2025-07-10 DIAGNOSIS — F32.A DEPRESSION, UNSPECIFIED DEPRESSION TYPE: ICD-10-CM

## 2025-07-10 DIAGNOSIS — I16.0 HYPERTENSIVE URGENCY: ICD-10-CM

## 2025-07-10 DIAGNOSIS — R79.89 ELEVATED TROPONIN: ICD-10-CM

## 2025-07-10 DIAGNOSIS — R07.9 CHEST PAIN, UNSPECIFIED TYPE: ICD-10-CM

## 2025-07-10 DIAGNOSIS — E87.5 HYPERKALEMIA: ICD-10-CM

## 2025-07-10 DIAGNOSIS — Z91.119 NONCOMPLIANCE OF PATIENT WITH DIETARY REGIMEN: Primary | ICD-10-CM

## 2025-07-10 DIAGNOSIS — R79.89 ELEVATED BRAIN NATRIURETIC PEPTIDE (BNP) LEVEL: ICD-10-CM

## 2025-07-10 DIAGNOSIS — E87.79 OTHER HYPERVOLEMIA: ICD-10-CM

## 2025-07-10 PROBLEM — J96.10 CHRONIC RESPIRATORY FAILURE (HCC): Status: ACTIVE | Noted: 2025-07-10

## 2025-07-10 PROBLEM — I50.32 CHRONIC HEART FAILURE WITH PRESERVED EJECTION FRACTION (HCC): Status: ACTIVE | Noted: 2025-07-10

## 2025-07-10 PROBLEM — E87.29 HIGH ANION GAP METABOLIC ACIDOSIS: Status: ACTIVE | Noted: 2025-07-10

## 2025-07-10 PROBLEM — I48.0 PAF (PAROXYSMAL ATRIAL FIBRILLATION) (HCC): Status: ACTIVE | Noted: 2025-07-10

## 2025-07-10 LAB
ALBUMIN SERPL BCG-MCNC: 4.1 G/DL (ref 3.4–4.9)
ALP SERPL-CCNC: 103 U/L (ref 40–129)
ALT SERPL W/O P-5'-P-CCNC: 10 U/L (ref 10–50)
ANION GAP SERPL CALC-SCNC: 20 MEQ/L (ref 8–16)
AST SERPL-CCNC: 19 U/L (ref 10–50)
BASE EXCESS BLDA CALC-SCNC: -1.6 MMOL/L (ref -2–3)
BASOPHILS ABSOLUTE: 0 THOU/MM3 (ref 0–0.1)
BASOPHILS NFR BLD AUTO: 0.5 %
BILIRUB SERPL-MCNC: 0.5 MG/DL (ref 0.3–1.2)
BUN SERPL-MCNC: 85 MG/DL (ref 8–23)
CA-I BLD ISE-SCNC: 1.17 MMOL/L (ref 1.12–1.32)
CALCIUM SERPL-MCNC: 10 MG/DL (ref 8.6–10)
CHLORIDE BLD-SCNC: 107 MEQ/L (ref 98–109)
CHLORIDE SERPL-SCNC: 99 MEQ/L (ref 98–111)
CO2 SERPL-SCNC: 21 MEQ/L (ref 22–29)
COLLECTED BY:: NORMAL
CREAT SERPL-MCNC: 15 MG/DL (ref 0.7–1.2)
DEPRECATED RDW RBC AUTO: 57.1 FL (ref 35–45)
DEVICE: NORMAL
EKG ATRIAL RATE: 82 BPM
EKG P AXIS: 74 DEGREES
EKG P-R INTERVAL: 236 MS
EKG Q-T INTERVAL: 448 MS
EKG QRS DURATION: 114 MS
EKG QTC CALCULATION (BAZETT): 523 MS
EKG R AXIS: -52 DEGREES
EKG T AXIS: 86 DEGREES
EKG VENTRICULAR RATE: 82 BPM
EOSINOPHIL NFR BLD AUTO: 2.4 %
EOSINOPHILS ABSOLUTE: 0.2 THOU/MM3 (ref 0–0.4)
ERYTHROCYTE [DISTWIDTH] IN BLOOD BY AUTOMATED COUNT: 15.5 % (ref 11.5–14.5)
FIO2 ON VENT O2 ANALYZER: 4 %
GFR SERPL CREATININE-BSD FRML MDRD: 3 ML/MIN/1.73M2
GLUCOSE SERPL-MCNC: 87 MG/DL (ref 74–109)
HCO3 BLDA-SCNC: 25 MMOL/L (ref 23–28)
HCT VFR BLD AUTO: 37.3 % (ref 42–52)
HGB BLD-MCNC: 11.9 GM/DL (ref 14–18)
IMM GRANULOCYTES # BLD AUTO: 0.01 THOU/MM3 (ref 0–0.07)
IMM GRANULOCYTES NFR BLD AUTO: 0.2 %
INR PPP: 1.03 (ref 0.85–1.13)
LIPASE SERPL-CCNC: 26 U/L (ref 13–60)
LYMPHOCYTES ABSOLUTE: 0.9 THOU/MM3 (ref 1–4.8)
LYMPHOCYTES NFR BLD AUTO: 13.3 %
MAGNESIUM SERPL-MCNC: 2.9 MG/DL (ref 1.6–2.6)
MCH RBC QN AUTO: 32.5 PG (ref 26–33)
MCHC RBC AUTO-ENTMCNC: 31.9 GM/DL (ref 32.2–35.5)
MCV RBC AUTO: 101.9 FL (ref 80–94)
MONOCYTES ABSOLUTE: 0.5 THOU/MM3 (ref 0.4–1.3)
MONOCYTES NFR BLD AUTO: 7 %
NEUTROPHILS ABSOLUTE: 5.1 THOU/MM3 (ref 1.8–7.7)
NEUTROPHILS NFR BLD AUTO: 76.6 %
NRBC BLD AUTO-RTO: 0 /100 WBC
NT-PROBNP SERPL IA-MCNC: ABNORMAL PG/ML (ref 0–124)
OSMOLALITY SERPL CALC.SUM OF ELEC: 304.6 MOSMOL/KG (ref 275–300)
PCO2 TEMP ADJ BLDMV: 46 MMHG (ref 41–51)
PH BLDMV: 7.34 [PH] (ref 7.31–7.41)
PLATELET # BLD AUTO: 134 THOU/MM3 (ref 130–400)
PMV BLD AUTO: 9.8 FL (ref 9.4–12.4)
PO2 BLDMV: 30 MMHG (ref 25–40)
POTASSIUM BLD-SCNC: 5.7 MEQ/L (ref 3.5–4.9)
POTASSIUM SERPL-SCNC: 6.2 MEQ/L (ref 3.5–5.2)
PROT SERPL-MCNC: 7.5 G/DL (ref 6.4–8.3)
PROTHROMBIN TIME: 11.7 SECONDS (ref 10–13.5)
RBC # BLD AUTO: 3.66 MILL/MM3 (ref 4.7–6.1)
REASON FOR REJECTION: NORMAL
REJECTED TEST: NORMAL
SAO2 % BLDMV: 52 %
SITE: NORMAL
SODIUM BLD-SCNC: 136 MEQ/L (ref 138–146)
SODIUM SERPL-SCNC: 140 MEQ/L (ref 135–145)
TROPONIN, HIGH SENSITIVITY: 111 NG/L (ref 0–12)
VENTILATION MODE VENT: NORMAL
WBC # BLD AUTO: 6.6 THOU/MM3 (ref 4.8–10.8)

## 2025-07-10 PROCEDURE — 6370000000 HC RX 637 (ALT 250 FOR IP)

## 2025-07-10 PROCEDURE — G0378 HOSPITAL OBSERVATION PER HR: HCPCS

## 2025-07-10 PROCEDURE — 6360000002 HC RX W HCPCS

## 2025-07-10 PROCEDURE — 82435 ASSAY OF BLOOD CHLORIDE: CPT

## 2025-07-10 PROCEDURE — 85610 PROTHROMBIN TIME: CPT

## 2025-07-10 PROCEDURE — 93005 ELECTROCARDIOGRAM TRACING: CPT

## 2025-07-10 PROCEDURE — 84132 ASSAY OF SERUM POTASSIUM: CPT

## 2025-07-10 PROCEDURE — 96365 THER/PROPH/DIAG IV INF INIT: CPT

## 2025-07-10 PROCEDURE — 99223 1ST HOSP IP/OBS HIGH 75: CPT

## 2025-07-10 PROCEDURE — 84295 ASSAY OF SERUM SODIUM: CPT

## 2025-07-10 PROCEDURE — 6360000004 HC RX CONTRAST MEDICATION: Performed by: STUDENT IN AN ORGANIZED HEALTH CARE EDUCATION/TRAINING PROGRAM

## 2025-07-10 PROCEDURE — 84484 ASSAY OF TROPONIN QUANT: CPT

## 2025-07-10 PROCEDURE — 80053 COMPREHEN METABOLIC PANEL: CPT

## 2025-07-10 PROCEDURE — 83880 ASSAY OF NATRIURETIC PEPTIDE: CPT

## 2025-07-10 PROCEDURE — 85025 COMPLETE CBC W/AUTO DIFF WBC: CPT

## 2025-07-10 PROCEDURE — 99222 1ST HOSP IP/OBS MODERATE 55: CPT | Performed by: INTERNAL MEDICINE

## 2025-07-10 PROCEDURE — 71045 X-RAY EXAM CHEST 1 VIEW: CPT

## 2025-07-10 PROCEDURE — 82330 ASSAY OF CALCIUM: CPT

## 2025-07-10 PROCEDURE — 83735 ASSAY OF MAGNESIUM: CPT

## 2025-07-10 PROCEDURE — 99285 EMERGENCY DEPT VISIT HI MDM: CPT

## 2025-07-10 PROCEDURE — 96375 TX/PRO/DX INJ NEW DRUG ADDON: CPT

## 2025-07-10 PROCEDURE — 82803 BLOOD GASES ANY COMBINATION: CPT

## 2025-07-10 PROCEDURE — 71275 CT ANGIOGRAPHY CHEST: CPT

## 2025-07-10 PROCEDURE — 36415 COLL VENOUS BLD VENIPUNCTURE: CPT

## 2025-07-10 PROCEDURE — 83690 ASSAY OF LIPASE: CPT

## 2025-07-10 PROCEDURE — 74174 CTA ABD&PLVS W/CONTRAST: CPT

## 2025-07-10 PROCEDURE — 90935 HEMODIALYSIS ONE EVALUATION: CPT | Performed by: INTERNAL MEDICINE

## 2025-07-10 PROCEDURE — 90935 HEMODIALYSIS ONE EVALUATION: CPT

## 2025-07-10 RX ORDER — ASPIRIN 81 MG/1
81 TABLET ORAL DAILY
Status: DISCONTINUED | OUTPATIENT
Start: 2025-07-11 | End: 2025-07-12 | Stop reason: HOSPADM

## 2025-07-10 RX ORDER — HYDRALAZINE HYDROCHLORIDE 50 MG/1
100 TABLET, FILM COATED ORAL EVERY 8 HOURS
Status: DISCONTINUED | OUTPATIENT
Start: 2025-07-11 | End: 2025-07-11

## 2025-07-10 RX ORDER — SODIUM CHLORIDE 9 MG/ML
INJECTION, SOLUTION INTRAVENOUS PRN
Status: DISCONTINUED | OUTPATIENT
Start: 2025-07-10 | End: 2025-07-12 | Stop reason: HOSPADM

## 2025-07-10 RX ORDER — ONDANSETRON 4 MG/1
4 TABLET, ORALLY DISINTEGRATING ORAL EVERY 8 HOURS PRN
Status: DISCONTINUED | OUTPATIENT
Start: 2025-07-10 | End: 2025-07-12 | Stop reason: HOSPADM

## 2025-07-10 RX ORDER — ONDANSETRON 2 MG/ML
4 INJECTION INTRAMUSCULAR; INTRAVENOUS ONCE
Status: COMPLETED | OUTPATIENT
Start: 2025-07-10 | End: 2025-07-10

## 2025-07-10 RX ORDER — LABETALOL 100 MG/1
100 TABLET, FILM COATED ORAL EVERY 8 HOURS
Status: DISCONTINUED | OUTPATIENT
Start: 2025-07-11 | End: 2025-07-12 | Stop reason: HOSPADM

## 2025-07-10 RX ORDER — ATORVASTATIN CALCIUM 40 MG/1
40 TABLET, FILM COATED ORAL DAILY
Status: DISCONTINUED | OUTPATIENT
Start: 2025-07-11 | End: 2025-07-12 | Stop reason: HOSPADM

## 2025-07-10 RX ORDER — ONDANSETRON 2 MG/ML
4 INJECTION INTRAMUSCULAR; INTRAVENOUS EVERY 6 HOURS PRN
Status: DISCONTINUED | OUTPATIENT
Start: 2025-07-10 | End: 2025-07-12 | Stop reason: HOSPADM

## 2025-07-10 RX ORDER — CALCIUM GLUCONATE 20 MG/ML
2000 INJECTION, SOLUTION INTRAVENOUS ONCE
Status: COMPLETED | OUTPATIENT
Start: 2025-07-10 | End: 2025-07-10

## 2025-07-10 RX ORDER — AMLODIPINE BESYLATE 5 MG/1
5 TABLET ORAL DAILY
Status: DISCONTINUED | OUTPATIENT
Start: 2025-07-11 | End: 2025-07-12 | Stop reason: HOSPADM

## 2025-07-10 RX ORDER — ASPIRIN 81 MG/1
324 TABLET, CHEWABLE ORAL ONCE
Status: COMPLETED | OUTPATIENT
Start: 2025-07-10 | End: 2025-07-10

## 2025-07-10 RX ORDER — NITROGLYCERIN 0.4 MG/1
0.4 TABLET SUBLINGUAL ONCE
Status: COMPLETED | OUTPATIENT
Start: 2025-07-10 | End: 2025-07-10

## 2025-07-10 RX ORDER — ACETAMINOPHEN 650 MG/1
650 SUPPOSITORY RECTAL EVERY 6 HOURS PRN
Status: DISCONTINUED | OUTPATIENT
Start: 2025-07-10 | End: 2025-07-12 | Stop reason: HOSPADM

## 2025-07-10 RX ORDER — ISOSORBIDE MONONITRATE 60 MG/1
120 TABLET, EXTENDED RELEASE ORAL 2 TIMES DAILY
Status: DISCONTINUED | OUTPATIENT
Start: 2025-07-11 | End: 2025-07-12 | Stop reason: HOSPADM

## 2025-07-10 RX ORDER — IOPAMIDOL 755 MG/ML
80 INJECTION, SOLUTION INTRAVASCULAR
Status: COMPLETED | OUTPATIENT
Start: 2025-07-10 | End: 2025-07-10

## 2025-07-10 RX ORDER — AMIODARONE HYDROCHLORIDE 200 MG/1
200 TABLET ORAL DAILY
Status: DISCONTINUED | OUTPATIENT
Start: 2025-07-11 | End: 2025-07-12 | Stop reason: HOSPADM

## 2025-07-10 RX ORDER — ACETAMINOPHEN 325 MG/1
650 TABLET ORAL EVERY 6 HOURS PRN
Status: DISCONTINUED | OUTPATIENT
Start: 2025-07-10 | End: 2025-07-12 | Stop reason: HOSPADM

## 2025-07-10 RX ORDER — SODIUM CHLORIDE 0.9 % (FLUSH) 0.9 %
5-40 SYRINGE (ML) INJECTION EVERY 12 HOURS SCHEDULED
Status: DISCONTINUED | OUTPATIENT
Start: 2025-07-10 | End: 2025-07-12 | Stop reason: HOSPADM

## 2025-07-10 RX ORDER — FOLIC ACID 1 MG/1
1 TABLET ORAL DAILY
Status: DISCONTINUED | OUTPATIENT
Start: 2025-07-11 | End: 2025-07-12 | Stop reason: HOSPADM

## 2025-07-10 RX ORDER — HYDROXYZINE PAMOATE 25 MG/1
50 CAPSULE ORAL 3 TIMES DAILY PRN
Status: DISCONTINUED | OUTPATIENT
Start: 2025-07-10 | End: 2025-07-12 | Stop reason: HOSPADM

## 2025-07-10 RX ORDER — SODIUM CHLORIDE 0.9 % (FLUSH) 0.9 %
5-40 SYRINGE (ML) INJECTION PRN
Status: DISCONTINUED | OUTPATIENT
Start: 2025-07-10 | End: 2025-07-12 | Stop reason: HOSPADM

## 2025-07-10 RX ORDER — POLYETHYLENE GLYCOL 3350 17 G/17G
17 POWDER, FOR SOLUTION ORAL DAILY PRN
Status: DISCONTINUED | OUTPATIENT
Start: 2025-07-10 | End: 2025-07-12 | Stop reason: HOSPADM

## 2025-07-10 RX ORDER — LABETALOL HYDROCHLORIDE 5 MG/ML
10 INJECTION, SOLUTION INTRAVENOUS ONCE
Status: COMPLETED | OUTPATIENT
Start: 2025-07-10 | End: 2025-07-10

## 2025-07-10 RX ADMIN — LABETALOL HYDROCHLORIDE 10 MG: 5 INJECTION, SOLUTION INTRAVENOUS at 15:10

## 2025-07-10 RX ADMIN — IOPAMIDOL 80 ML: 755 INJECTION, SOLUTION INTRAVENOUS at 15:24

## 2025-07-10 RX ADMIN — NITROGLYCERIN 0.4 MG: 0.4 TABLET SUBLINGUAL at 14:28

## 2025-07-10 RX ADMIN — NITROGLYCERIN 0.4 MG: 0.4 TABLET SUBLINGUAL at 14:34

## 2025-07-10 RX ADMIN — ONDANSETRON 4 MG: 2 INJECTION, SOLUTION INTRAMUSCULAR; INTRAVENOUS at 14:27

## 2025-07-10 RX ADMIN — CALCIUM GLUCONATE 2000 MG: 20 INJECTION, SOLUTION INTRAVENOUS at 15:13

## 2025-07-10 RX ADMIN — ASPIRIN 324 MG: 81 TABLET, CHEWABLE ORAL at 14:26

## 2025-07-10 RX ADMIN — CLONIDINE HYDROCHLORIDE 0.3 MG: 0.2 TABLET ORAL at 20:37

## 2025-07-10 NOTE — PROGRESS NOTES
Patient seen and examined  Diagnosis ESRD  Seen and examined on dialysis  Tolerating hemodialysis treatment well  Ultrafiltration goal 5 L  Vitals noted  Plan to repeat dialysis tomorrow  Full consult note to follow

## 2025-07-10 NOTE — ED NOTES
Patient to CTA then to IP dialysis via transport tech in stable condition. GARLAND Garcias (dialysis) informed.

## 2025-07-10 NOTE — CONSULTS
David Villasenor, DO   cloNIDine (CATAPRES) 0.3 MG tablet Take 1 tablet by mouth 3 times daily 3/11/25   David Villasenor P, DO   hydrALAZINE (APRESOLINE) 100 MG tablet Take 1 tablet by mouth every 8 (eight) hours 3/11/25 6/9/25  David Villasenor P, DO   isosorbide mononitrate (IMDUR) 120 MG extended release tablet Take 1 tablet by mouth in the morning and at bedtime 3/11/25 6/9/25  David Villasenor P, DO   labetalol (NORMODYNE) 100 MG tablet Take 1 tablet by mouth every 8 hours 3/11/25   David Villasenor P, DO   folic acid (FOLVITE) 1 MG tablet Take 1 tablet by mouth daily 3/12/25   David Villasenor, DO   atorvastatin (LIPITOR) 80 MG tablet Take 0.5 tablets by mouth daily    Provider, MD Jaime   hydrOXYzine pamoate (VISTARIL) 50 MG capsule Take 1 capsule by mouth 3 times daily as needed for Anxiety 11/18/24   Provider, MD Jaime   amiodarone (CORDARONE) 200 MG tablet Take 1 tablet by mouth daily 12/28/24   Demarco Cramer MD   aspirin 81 MG EC tablet Take 1 tablet by mouth daily 8/6/24   Pat Carney MD       Review of Systems:  Constitutional: Positive for generalized weakness, fatigue, shortness of breath  Head: Negative for headaches  Eyes: Negative for blurry vision or discharge  Ears: Negative for ear pain or hearing changes  Nose: Negative for runny nose or epistaxis  Respiratory: Positive for shortness of breath.  Negative for cough or sputum production. Negative for hemoptysis  Cardiovascular: Positive for chest pain  GI: Negative for nausea, vomiting and diarrhea.  Negative for hematochezia and melena  Skin: Negative for rash  Musculoskeletal: Negative for joint pain, moves all ext  Neuro: Negative for numbness or tingling, negative for slurred speech  Psychiatric: Reports stable mood, negative for depression or insomnia    All other review of systems were reviewed and negative    Current Meds:  Infusion:   Meds:   Meds prn:      Allergies/Intolerances:  ALLERGIES: Humalog [insulin  Plan:  ESRD on HD TTS  Patient missed last 2 dialysis treatments.  Presented with hyperkalemia and volume overload.  Arranged emergent hemodialysis and seen on dialysis and tolerating hemodialysis treatment well  Ultrafiltration goal 5 kg  Plan repeat HD tomorrow.  Patient in agreement  Shortness of breath secondary to fluid overload.  Chest x-ray shows CHF and bilateral pleural effusions.  Planning 5 L ultrafiltration  Hyperkalemia.  Will correct with hemodialysis  Essential hypertension  Anemia in ESRD  History of noncompliance  History of substance use      D/W patient and HD RN    Thank you for the consult. Please feel free to call me if you have any questions.     David Brown MD  [facility]    This report has been created using voice recognition software. It may contain minor errors which are inherent in voice recognition technology.

## 2025-07-10 NOTE — H&P
VASCULAR SURGERY Right 1990    Surgery on Achilles tendon       Home Medications:   Current Facility-Administered Medications on File Prior to Encounter   Medication Dose Route Frequency Provider Last Rate Last Admin    HYDROmorphone (DILAUDID) injection 1 mg  1 mg IntraVENous Once Yovany Perla MD        midazolam (VERSED) injection 1 mg  1 mg IntraVENous Once Yovany Perla MD         Current Outpatient Medications on File Prior to Encounter   Medication Sig Dispense Refill    apixaban (ELIQUIS) 2.5 MG TABS tablet Take 1 tablet by mouth 2 times daily 60 tablet 3    amLODIPine (NORVASC) 5 MG tablet Take 1 tablet by mouth daily 90 tablet 0    cloNIDine (CATAPRES) 0.3 MG tablet Take 1 tablet by mouth 3 times daily 270 tablet 0    hydrALAZINE (APRESOLINE) 100 MG tablet Take 1 tablet by mouth every 8 (eight) hours 270 tablet 0    isosorbide mononitrate (IMDUR) 120 MG extended release tablet Take 1 tablet by mouth in the morning and at bedtime 180 tablet 0    labetalol (NORMODYNE) 100 MG tablet Take 1 tablet by mouth every 8 hours 270 tablet 0    folic acid (FOLVITE) 1 MG tablet Take 1 tablet by mouth daily 30 tablet 3    atorvastatin (LIPITOR) 80 MG tablet Take 0.5 tablets by mouth daily      hydrOXYzine pamoate (VISTARIL) 50 MG capsule Take 1 capsule by mouth 3 times daily as needed for Anxiety      amiodarone (CORDARONE) 200 MG tablet Take 1 tablet by mouth daily 30 tablet 0    aspirin 81 MG EC tablet Take 1 tablet by mouth daily 30 tablet 0       Allergies:    Humalog [insulin lispro], Insulin regular human, and Lisinopril    Social History:    reports that he has quit smoking. His smoking use included cigarettes. He started smoking about 39 years ago. He has a 9.9 pack-year smoking history. He has never used smokeless tobacco. He reports current alcohol use of about 20.0 standard drinks of alcohol per week. He reports current drug use. Drug: Cocaine.    Family History:       Problem Relation Age of Onset     stable. Chronic findings are discussed.            **This report has been created using voice recognition software.  It may contain   minor errors which are inherent in voice recognition technology.**      Electronically signed by Dr. Ailyn Vu      XR CHEST PORTABLE   Final Result   Congestive heart failure changes and small pleural effusions.               **This report has been created using voice recognition software. It may contain   minor errors which are inherent in voice recognition technology.**      Electronically signed by Dr Eliu Robles        CTA Chest W W/O Aortic Dissection  Result Date: 7/10/2025  PROCEDURE: CTA ABDOMEN PELVIS W WO CONTRAST, CTA CHEST W WO CONTRAST CLINICAL INFORMATION: Chest and abdominal pain. COMPARISON: CTA chest 1/4/2025. CT abdomen and pelvis 12/4/2024. TECHNIQUE: 3mm noncontrast axial images were obtained through the chest abdomen and pelvis.  Then 3 mm axial images were obtained through the chest abdomen and pelvis after the administration of intravenous contrast.  3D reconstructions were performed on the scanner to include sagittal and coronal MIP reconstructions through the chest abdomen and pelvis. All CT scans at this facility use dose modulation, iterative reconstruction, and/or weight based dosing when appropriate to reduce the radiation dose to as low as reasonably achievable. CONTRAST: 80 cc Isovue-370. FINDINGS: Heart/mediastinum: A thoracic stent graft within the aortic arch is unchanged. The native aortic arch diameter measures up to 5.7 cm (previously measured up to 5.8 cm). The native descending thoracic aorta measures up to 5.2 cm (previously measured 5.2 cm). 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 Cardiomegaly is unchanged. No pericardial effusion is identified. No lymphadenopathy is observed. The

## 2025-07-10 NOTE — ED TRIAGE NOTES
Patient to ED via intake due to SOB/Chest pain, has not received dialysis since Saturday. Patient states he has been in a funk since losing 3 family members recently and just has been depressed. Pt states he is NOT SI/HI.

## 2025-07-10 NOTE — FLOWSHEET NOTE
07/10/25 1925   Vital Signs   BP (!) 200/110   Temp 98 °F (36.7 °C)   Pulse 78   Respirations 18   Weight - Scale 94.9 kg (209 lb 3.5 oz)   Weight Method Standing scale   Percent Weight Change -7.68   Post-Hemodialysis Assessment   Post-Treatment Procedures Blood returned;Access bleeding time < 10 minutes   Machine Disinfection Process Acid/Vinegar Clean;Heat Disinfect;Exterior Machine Disinfection   Rinseback Volume (ml) 400 ml   Dialyzer Clearance Moderately streaked   Duration of Treatment (minutes) 200 minutes   Heparin Amount Administered During Treatment (mL) 0 mL   Hemodialysis Intake (ml) 500 ml   Hemodialysis Output (ml) 4200 ml   NET Removed (ml) 3700     treatment ended 40 mins early needle clotted off. 3 hours 20 mins of 4 hour treatment completed. 3.7 liters net uf. pt reported cramping at the end of tx.  pressure held both cannulation sites x 10 minutes. dressing dry and intact upon discharge from unit. report called to primary nurse.

## 2025-07-11 LAB
ALBUMIN SERPL BCG-MCNC: 3.5 G/DL (ref 3.4–4.9)
ALP SERPL-CCNC: 90 U/L (ref 40–129)
ALT SERPL W/O P-5'-P-CCNC: 7 U/L (ref 10–50)
ANION GAP SERPL CALC-SCNC: 13 MEQ/L (ref 8–16)
ANION GAP SERPL CALC-SCNC: 14 MEQ/L (ref 8–16)
AST SERPL-CCNC: 16 U/L (ref 10–50)
BASOPHILS ABSOLUTE: 0 THOU/MM3 (ref 0–0.1)
BASOPHILS NFR BLD AUTO: 0.6 %
BILIRUB CONJ SERPL-MCNC: < 0.1 MG/DL (ref 0–0.2)
BILIRUB SERPL-MCNC: 0.4 MG/DL (ref 0.3–1.2)
BUN SERPL-MCNC: 44 MG/DL (ref 8–23)
BUN SERPL-MCNC: 47 MG/DL (ref 8–23)
CALCIUM SERPL-MCNC: 8.8 MG/DL (ref 8.6–10)
CALCIUM SERPL-MCNC: 8.9 MG/DL (ref 8.6–10)
CHLORIDE SERPL-SCNC: 100 MEQ/L (ref 98–111)
CHLORIDE SERPL-SCNC: 103 MEQ/L (ref 98–111)
CO2 SERPL-SCNC: 22 MEQ/L (ref 22–29)
CO2 SERPL-SCNC: 26 MEQ/L (ref 22–29)
CREAT SERPL-MCNC: 10.1 MG/DL (ref 0.7–1.2)
CREAT SERPL-MCNC: 9.4 MG/DL (ref 0.7–1.2)
DEPRECATED RDW RBC AUTO: 57 FL (ref 35–45)
EKG ATRIAL RATE: 68 BPM
EKG P AXIS: 44 DEGREES
EKG P-R INTERVAL: 222 MS
EKG Q-T INTERVAL: 480 MS
EKG QRS DURATION: 110 MS
EKG QTC CALCULATION (BAZETT): 510 MS
EKG R AXIS: -47 DEGREES
EKG T AXIS: 77 DEGREES
EKG VENTRICULAR RATE: 68 BPM
EOSINOPHIL NFR BLD AUTO: 4.9 %
EOSINOPHILS ABSOLUTE: 0.2 THOU/MM3 (ref 0–0.4)
ERYTHROCYTE [DISTWIDTH] IN BLOOD BY AUTOMATED COUNT: 15.3 % (ref 11.5–14.5)
GFR SERPL CREATININE-BSD FRML MDRD: 5 ML/MIN/1.73M2
GFR SERPL CREATININE-BSD FRML MDRD: 6 ML/MIN/1.73M2
GLUCOSE SERPL-MCNC: 140 MG/DL (ref 74–109)
GLUCOSE SERPL-MCNC: 167 MG/DL (ref 74–109)
HCT VFR BLD AUTO: 35.6 % (ref 42–52)
HGB BLD-MCNC: 11 GM/DL (ref 14–18)
IMM GRANULOCYTES # BLD AUTO: 0.01 THOU/MM3 (ref 0–0.07)
IMM GRANULOCYTES NFR BLD AUTO: 0.2 %
LYMPHOCYTES ABSOLUTE: 0.7 THOU/MM3 (ref 1–4.8)
LYMPHOCYTES NFR BLD AUTO: 15 %
MAGNESIUM SERPL-MCNC: 2.7 MG/DL (ref 1.6–2.6)
MCH RBC QN AUTO: 31.6 PG (ref 26–33)
MCHC RBC AUTO-ENTMCNC: 30.9 GM/DL (ref 32.2–35.5)
MCV RBC AUTO: 102.3 FL (ref 80–94)
MONOCYTES ABSOLUTE: 0.6 THOU/MM3 (ref 0.4–1.3)
MONOCYTES NFR BLD AUTO: 12 %
NEUTROPHILS ABSOLUTE: 3.3 THOU/MM3 (ref 1.8–7.7)
NEUTROPHILS NFR BLD AUTO: 67.3 %
NRBC BLD AUTO-RTO: 0 /100 WBC
OSMOLALITY SERPL CALC.SUM OF ELEC: 292.1 MOSMOL/KG (ref 275–300)
PLATELET # BLD AUTO: 119 THOU/MM3 (ref 130–400)
PMV BLD AUTO: 9.6 FL (ref 9.4–12.4)
POTASSIUM SERPL-SCNC: 4.9 MEQ/L (ref 3.5–5.2)
POTASSIUM SERPL-SCNC: 5.8 MEQ/L (ref 3.5–5.2)
PROT SERPL-MCNC: 6.5 G/DL (ref 6.4–8.3)
RBC # BLD AUTO: 3.48 MILL/MM3 (ref 4.7–6.1)
SODIUM SERPL-SCNC: 139 MEQ/L (ref 135–145)
SODIUM SERPL-SCNC: 139 MEQ/L (ref 135–145)
TROPONIN, HIGH SENSITIVITY: 121 NG/L (ref 0–12)
WBC # BLD AUTO: 4.9 THOU/MM3 (ref 4.8–10.8)

## 2025-07-11 PROCEDURE — 99232 SBSQ HOSP IP/OBS MODERATE 35: CPT | Performed by: INTERNAL MEDICINE

## 2025-07-11 PROCEDURE — 80053 COMPREHEN METABOLIC PANEL: CPT

## 2025-07-11 PROCEDURE — 36415 COLL VENOUS BLD VENIPUNCTURE: CPT

## 2025-07-11 PROCEDURE — 82248 BILIRUBIN DIRECT: CPT

## 2025-07-11 PROCEDURE — 6370000000 HC RX 637 (ALT 250 FOR IP)

## 2025-07-11 PROCEDURE — G0378 HOSPITAL OBSERVATION PER HR: HCPCS

## 2025-07-11 PROCEDURE — 93005 ELECTROCARDIOGRAM TRACING: CPT

## 2025-07-11 PROCEDURE — 99232 SBSQ HOSP IP/OBS MODERATE 35: CPT | Performed by: PHYSICIAN ASSISTANT

## 2025-07-11 PROCEDURE — 2500000003 HC RX 250 WO HCPCS

## 2025-07-11 PROCEDURE — 85025 COMPLETE CBC W/AUTO DIFF WBC: CPT

## 2025-07-11 PROCEDURE — 83735 ASSAY OF MAGNESIUM: CPT

## 2025-07-11 PROCEDURE — 90935 HEMODIALYSIS ONE EVALUATION: CPT

## 2025-07-11 RX ORDER — HYDRALAZINE HYDROCHLORIDE 50 MG/1
100 TABLET, FILM COATED ORAL EVERY 8 HOURS
Status: DISCONTINUED | OUTPATIENT
Start: 2025-07-11 | End: 2025-07-12 | Stop reason: HOSPADM

## 2025-07-11 RX ADMIN — CLONIDINE HYDROCHLORIDE 0.3 MG: 0.2 TABLET ORAL at 14:48

## 2025-07-11 RX ADMIN — ISOSORBIDE MONONITRATE 120 MG: 60 TABLET, EXTENDED RELEASE ORAL at 21:25

## 2025-07-11 RX ADMIN — ISOSORBIDE MONONITRATE 120 MG: 60 TABLET, EXTENDED RELEASE ORAL at 01:28

## 2025-07-11 RX ADMIN — ATORVASTATIN CALCIUM 40 MG: 40 TABLET, FILM COATED ORAL at 12:34

## 2025-07-11 RX ADMIN — LABETALOL HYDROCHLORIDE 100 MG: 100 TABLET, FILM COATED ORAL at 18:48

## 2025-07-11 RX ADMIN — ASPIRIN 81 MG: 81 TABLET, COATED ORAL at 12:34

## 2025-07-11 RX ADMIN — CLONIDINE HYDROCHLORIDE 0.3 MG: 0.2 TABLET ORAL at 20:15

## 2025-07-11 RX ADMIN — HYDRALAZINE HYDROCHLORIDE 100 MG: 50 TABLET ORAL at 21:25

## 2025-07-11 RX ADMIN — HYDRALAZINE HYDROCHLORIDE 100 MG: 50 TABLET ORAL at 12:33

## 2025-07-11 RX ADMIN — AMLODIPINE BESYLATE 5 MG: 5 TABLET ORAL at 12:33

## 2025-07-11 RX ADMIN — APIXABAN 2.5 MG: 2.5 TABLET, FILM COATED ORAL at 01:27

## 2025-07-11 RX ADMIN — FOLIC ACID 1 MG: 1 TABLET ORAL at 12:35

## 2025-07-11 RX ADMIN — APIXABAN 2.5 MG: 2.5 TABLET, FILM COATED ORAL at 12:43

## 2025-07-11 RX ADMIN — SODIUM CHLORIDE, PRESERVATIVE FREE 10 ML: 5 INJECTION INTRAVENOUS at 20:16

## 2025-07-11 RX ADMIN — SODIUM CHLORIDE, PRESERVATIVE FREE 10 ML: 5 INJECTION INTRAVENOUS at 01:27

## 2025-07-11 RX ADMIN — HYDRALAZINE HYDROCHLORIDE 100 MG: 50 TABLET ORAL at 05:36

## 2025-07-11 RX ADMIN — ACETAMINOPHEN 650 MG: 325 TABLET ORAL at 09:44

## 2025-07-11 RX ADMIN — APIXABAN 2.5 MG: 2.5 TABLET, FILM COATED ORAL at 20:15

## 2025-07-11 RX ADMIN — LABETALOL HYDROCHLORIDE 100 MG: 100 TABLET, FILM COATED ORAL at 01:27

## 2025-07-11 RX ADMIN — AMIODARONE HYDROCHLORIDE 200 MG: 200 TABLET ORAL at 12:33

## 2025-07-11 ASSESSMENT — PAIN DESCRIPTION - LOCATION: LOCATION: HEAD

## 2025-07-11 ASSESSMENT — PAIN SCALES - GENERAL: PAINLEVEL_OUTOF10: 4

## 2025-07-11 NOTE — ED NOTES
Patient returned from dialysis - reports feeling much better. Dr. Donaldson at bedside. Pt given lunch box and placed back onto monitor.

## 2025-07-11 NOTE — PLAN OF CARE
Problem: Chronic Conditions and Co-morbidities  Goal: Patient's chronic conditions and co-morbidity symptoms are monitored and maintained or improved  7/11/2025 1852 by Yanna Orourke RN  Outcome: Progressing  7/11/2025 0503 by Alex Huertas RN  Outcome: Progressing  Flowsheets (Taken 7/11/2025 0503)  Care Plan - Patient's Chronic Conditions and Co-Morbidity Symptoms are Monitored and Maintained or Improved:   Monitor and assess patient's chronic conditions and comorbid symptoms for stability, deterioration, or improvement   Collaborate with multidisciplinary team to address chronic and comorbid conditions and prevent exacerbation or deterioration     Problem: Discharge Planning  Goal: Discharge to home or other facility with appropriate resources  7/11/2025 1852 by Yanna Orourke RN  Outcome: Progressing  7/11/2025 0503 by Alex Huertas RN  Outcome: Progressing  Flowsheets (Taken 7/11/2025 0503)  Discharge to home or other facility with appropriate resources:   Identify barriers to discharge with patient and caregiver   Identify discharge learning needs (meds, wound care, etc)   Refer to discharge planning if patient needs post-hospital services based on physician order or complex needs related to functional status, cognitive ability or social support system     Problem: Safety - Adult  Goal: Free from fall injury  7/11/2025 1852 by Yanna Orourke RN  Outcome: Progressing  7/11/2025 0503 by Alex Huertas RN  Outcome: Progressing  Flowsheets (Taken 7/11/2025 0503)  Free From Fall Injury: Instruct family/caregiver on patient safety     Problem: ABCDS Injury Assessment  Goal: Absence of physical injury  7/11/2025 1852 by Yanna Orourke RN  Outcome: Progressing  7/11/2025 0503 by Alex Huertas RN  Outcome: Progressing  Flowsheets (Taken 7/11/2025 0503)  Absence of Physical Injury: Implement safety measures based on patient assessment     Problem: Pain  Goal: Verbalizes/displays

## 2025-07-11 NOTE — PLAN OF CARE
Problem: Chronic Conditions and Co-morbidities  Goal: Patient's chronic conditions and co-morbidity symptoms are monitored and maintained or improved  Outcome: Progressing  Flowsheets (Taken 7/11/2025 0503)  Care Plan - Patient's Chronic Conditions and Co-Morbidity Symptoms are Monitored and Maintained or Improved:   Monitor and assess patient's chronic conditions and comorbid symptoms for stability, deterioration, or improvement   Collaborate with multidisciplinary team to address chronic and comorbid conditions and prevent exacerbation or deterioration     Problem: Discharge Planning  Goal: Discharge to home or other facility with appropriate resources  Outcome: Progressing  Flowsheets (Taken 7/11/2025 0503)  Discharge to home or other facility with appropriate resources:   Identify barriers to discharge with patient and caregiver   Identify discharge learning needs (meds, wound care, etc)   Refer to discharge planning if patient needs post-hospital services based on physician order or complex needs related to functional status, cognitive ability or social support system     Problem: Safety - Adult  Goal: Free from fall injury  Outcome: Progressing  Flowsheets (Taken 7/11/2025 0503)  Free From Fall Injury: Instruct family/caregiver on patient safety     Problem: ABCDS Injury Assessment  Goal: Absence of physical injury  Outcome: Progressing  Flowsheets (Taken 7/11/2025 0503)  Absence of Physical Injury: Implement safety measures based on patient assessment     Problem: Pain  Goal: Verbalizes/displays adequate comfort level or baseline comfort level  Outcome: Progressing  Flowsheets (Taken 7/11/2025 0503)  Verbalizes/displays adequate comfort level or baseline comfort level:   Encourage patient to monitor pain and request assistance   Assess pain using appropriate pain scale     Problem: Cardiovascular - Adult  Goal: Maintains optimal cardiac output and hemodynamic stability  Outcome: Progressing  Flowsheets  (Taken 7/11/2025 0503)  Maintains optimal cardiac output and hemodynamic stability:   Monitor blood pressure and heart rate   Assess for signs of decreased cardiac output     Problem: Cardiovascular - Adult  Goal: Absence of cardiac dysrhythmias or at baseline  Outcome: Progressing  Flowsheets (Taken 7/11/2025 0503)  Absence of cardiac dysrhythmias or at baseline:   Monitor cardiac rate and rhythm   Assess for signs of decreased cardiac output     Problem: Metabolic/Fluid and Electrolytes - Adult  Goal: Hemodynamic stability and optimal renal function maintained  Outcome: Progressing  Flowsheets (Taken 7/11/2025 0503)  Hemodynamic stability and optimal renal function maintained:   Monitor labs and assess for signs and symptoms of volume excess or deficit   Monitor intake, output and patient weight   Instruct patient on fluid and nutrition restrictions as appropriate   Monitor response to interventions for patient's volume status, including labs, urine output, blood pressure (other measures as available)

## 2025-07-11 NOTE — PROCEDURES
PROCEDURE NOTE  Date: 7/11/2025   Name: Trav Jennings  YOB: 1965    Procedures      EKG Completed. Handed to RN.

## 2025-07-11 NOTE — PROGRESS NOTES
Pt admitted to  3B 25 via wheelchair, alert and oriented with oxygen support at 2L via nasal cannula. S/P hemodialysis.   No IV infusing into the Right AC. IV site free of s/s of infection or infiltration. Vital signs obtained. Assessment and data collection initiated. Two nurse skin assessment performed by ailyn HAQUE and Nicolasa HAQUE. Oriented to room. Policies and procedures for  explained. RN discussed hourly rounding with patient addressing 5 P's. Fall prevention and safety brochure discussed with patient.  Bed alarm on. Call light in reach. Care plan explained. All questions answered with no further questions at this time.

## 2025-07-11 NOTE — PROGRESS NOTES
Hospitalist Progress Note      Patient:  Trav Jennings 60 y.o. male     : 1965  Unit/Bed:3B-25/025-A  Date of Admission: 7/10/2025        ASSESSMENT AND PLAN    Active Problems  ESRD on hemodialysis TTS  Missed 2 dialysis sessions prior to admission  Presented with hyperkalemia and volume overload  Status post HD 7/10, .  Return to TTS dialysis starting    BNP 52200.   Nephrology consulted    Shortness of breath secondary to fluid overload  3.7 L of fluid removed yesterday.  Another round of HD with ultrafiltration today  CXR congestive heart failure changes and small pleural effusions.   CTA Chest/A&P: small bilateral pleural effusions and bilateral lower lobe scarring/atelectasis is stable, chronic findings discussed.   Continue with management with  dialysis as above    Hyperkalemia:   Nephrology following-will correct with dialysis     HAGMA-resolved  Secondary to missed dialysis  BMP daily    Acute chest pain-resolved   reports left sided non-radiating chest pain with associated nausea. Heart score 6.  Troponin stable and at baseline   EKG without acute ischemic changes. Likely multifactorial due to fluid overload, ESRD --reports improvement after dialysis.   Maintain telemetry    Hypertensive urgency:   /108 on arrival. Reports he has not been taking his home medications. Improved after dialysis.   Continue amiodarone, amlodipine, clonidine, hydralazine, isosorbide, labetalol    Chronic respiratory failure: Reports using 3-4LNC at home at baseline. Currently on 4LNC.   Continue home O2 at 3-4LNC  Pulmonary hygiene: IS, C&DB    HFpEF: 25 Echo: EF 70-75%, hyperdynamic LV systolic function, increased ventricular mass, findings consistent with severe concentric hypertrophy, severe LVOT obstruction at rest, LVOT pressure gradient increases with Valsalva.   Daily weights, I&O  2L fluid restriction    NIDDM2: 23 A1c 5.5%. Not on any home medications.       PAF: [id number] 3. EKG

## 2025-07-11 NOTE — PROGRESS NOTES
[facility]   Nephrology progress note  7/11/2025, 11:20 AM      Pt Name:    Trav Jennings  MRN:     [id number]     YOB: 1965  Admit Date:    7/10/2025  1:57 PM    Chief Complaint: Nephrology following for ESRD    Subjective:  Patient was seen and examined this morning  Feels better    Objective:  24HR INTAKE/OUTPUT:    Intake/Output Summary (Last 24 hours) at 7/11/2025 1120  Last data filed at 7/11/2025 0536  Gross per 24 hour   Intake 1050 ml   Output 4200 ml   Net -3150 ml         I/O last 3 completed shifts:  In: 1050 [P.O.:450; IV Piggyback:100]  Out: 4200   No intake/output data recorded.   Admission weight: 102.8 kg (226 lb 10.1 oz)  Wt Readings from Last 3 Encounters:   07/11/25 96.8 kg (213 lb 6.5 oz)   06/21/25 93.2 kg (205 lb 7.5 oz)   06/05/25 94.3 kg (207 lb 14.3 oz)        Vitals :   Vitals:    07/11/25 0500 07/11/25 0536 07/11/25 0731 07/11/25 0740   BP:  (!) 161/72  (!) 159/80   Pulse:    73   Resp:    18   Temp:    98.3 °F (36.8 °C)   TempSrc:       SpO2:    97%   Weight: 95.8 kg (211 lb 3.2 oz)  96.8 kg (213 lb 6.5 oz) 96.8 kg (213 lb 6.5 oz)   Height:           Physical examination  General Appearance: alert and cooperative with exam, appears comfortable, no distress  Mouth/Throat: Oral mucosa moist  Neck: No JVD  Lungs: Air entry diminished, no use of accessory muscles  Heart:  S1, S2 heard  GI: soft, non-tender, no guarding  Extremities: + LE edema    Medications:  Infusion:    sodium chloride       Meds:    hydrALAZINE  100 mg Oral q8h    sodium chloride flush  5-40 mL IntraVENous 2 times per day    aspirin  81 mg Oral Daily    cloNIDine  0.3 mg Oral TID    amiodarone  200 mg Oral Daily    amLODIPine  5 mg Oral Daily    apixaban  2.5 mg Oral BID    atorvastatin  40 mg Oral Daily    folic acid  1 mg Oral Daily    isosorbide mononitrate  120 mg Oral BID    labetalol  100 mg Oral q8h     Meds prn: sodium chloride flush, sodium chloride, ondansetron **OR**

## 2025-07-11 NOTE — FLOWSHEET NOTE
4 hour TX completed. C/o cramping last 30 minutes of tx, UF decreased to 4000.  Removed 3.6 L of fluid, tolerated well. Pressure held to each needle site x 10 min. Drsg clean, dry, and intact upon leaving unit. TX record printed for scanning into EMR. Report called to primary RN.   07/11/25 0740 07/11/25 1210   Vital Signs   BP (!) 159/80 (!) 178/95   Temp 98.3 °F (36.8 °C) 98.2 °F (36.8 °C)   Pulse 73 72   Respirations 18 14   SpO2 97 % 98 %   Weight - Scale 96.8 kg (213 lb 6.5 oz) 93.2 kg (205 lb 7.5 oz)   Weight Method Standing scale  --    Percent Weight Change  --  -3.72   Post-Hemodialysis Assessment   Post-Treatment Procedures  --  Blood returned;Access bleeding time < 10 minutes   Blood Volume Processed (Liters)  --  100.3 L   Dialyzer Clearance  --  Lightly streaked   Duration of Treatment (minutes)  --  240 minutes   Heparin Amount Administered During Treatment (mL)  --  0 mL   Hemodialysis Intake (ml)  --  400 ml   Hemodialysis Output (ml)  --  4036 ml   NET Removed (ml)  --  3636

## 2025-07-11 NOTE — CARE COORDINATION
Case Management Assessment Initial Evaluation    Date/Time of Evaluation: 2025 7:18 AM  Assessment Completed by: Yue Chen, RN    If patient is discharged prior to next notation, then this note serves as note for discharge by case management.    Patient Name: Trav Jain                   YOB: 1965  Diagnosis: Hyperkalemia [E87.5]  Elevated troponin [R79.89]  Fluid overload [E87.70]  Hypertensive urgency [I16.0]  Elevated brain natriuretic peptide (BNP) level [R79.89]  Other hypervolemia [E87.79]  Chest pain, unspecified type [R07.9]                   Date / Time: 7/10/2025  1:57 PM  Location: [address] [place] [address]     Patient Admission Status: Observation   Readmission Risk Low 0-14, Mod 15-19), High > 20: Readmission Risk Score: 37.4    Current PCP: Radha Hou APRN - CNP  Health Care Decision Makers:   Primary Decision Maker: UZIEL JAIN - Spouse - [phone]    Additional Case Management Notes: Admitted through ED as observation with chest pain, SOB and leg swelling. Troponin 111 and 121. BUN 44 and 47, creatinine 9.4 and 10.1. BP upon arrival was 215/108. This am 161/72. O2 on at 3-4L/nc sats %. (Baseline). IS. Consulted Nephrology.     Procedures: None    Imagin/10 PCXR: Congestive heart failure changes and small pleural effusions.   7/10 CTA Chest/Abd/Pelvis:   1. The thoracic aortic stent graft placement across the aortic arch is  unchanged. 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 The dissection  flap within the distal descending thoracic aorta extending through the abdominal  aorta into the bilateral iliac vessels is unchanged in the configuration of the  thoracic and abdominal aorta in the aneurysm sac measurements at the level of  the aortic arch, descending thoracic aorta and within the infrarenal abdominal  aorta remain stable with representative measurements provided in the

## 2025-07-11 NOTE — ED NOTES
Pt transported to [facility]. Floor contacted prior to transport, spoke to floor staff. Pt in stable condition.

## 2025-07-11 NOTE — ED NOTES
ED to inpatient nurses report      Chief Complaint:  Chest Pain, Hypervolemia, Missed Dialysis    Present to ED from: Home by private    MOA:     LOC: alert and orientated to name, place, date  Mobility: Requires assistance * 1  Oxygen Baseline: 4lpm    Current needs required: 4lpm     Code Status:   Prior    What abnormal results were found and what did you give/do to treat them? See labs - K+ elevated, got calcium gluconate, BNP elevated  Any procedures or intervention occur? IP dialysis    Mental Status:  Level of Consciousness: Alert (0)    Psych Assessment:        Vitals:  Patient Vitals for the past 24 hrs:   BP Temp Pulse Resp SpO2 Weight   07/10/25 2015 (!) 198/105 -- 82 18 -- --   07/10/25 1925 (!) 200/110 98 °F (36.7 °C) 78 18 -- 94.9 kg (209 lb 3.5 oz)   07/10/25 1540 (!) 194/108 98.2 °F (36.8 °C) 74 19 95 % 102.8 kg (226 lb 10.1 oz)   07/10/25 1437 (!) 197/102 -- 84 20 -- --   07/10/25 1432 (!) 194/102 -- 83 19 -- --   07/10/25 1428 (!) 208/101 -- 80 -- -- --   07/10/25 1404 (!) 215/108 -- 81 18 99 % --        LDAs:   Peripheral IV 07/10/25 Right Antecubital (Active)       Ambulatory Status:  No data recorded    Diagnosis:  DISPOSITION Decision To Admit 07/10/2025 07:29:34 PM   Final diagnoses:   Chest pain, unspecified type   Other hypervolemia   Elevated brain natriuretic peptide (BNP) level   Hyperkalemia   Elevated troponin   Hypertensive urgency        Consults:  IP CONSULT TO NEPHROLOGY     Pain Score:  Pain Assessment  Pain Assessment: None - Denies Pain    C-SSRS:   Risk of Suicide: No Risk      Swallow Screening    Pass - pt ate food box in ED     Preferred Language:   English      ALLERGIES     Humalog [insulin lispro], Insulin regular human, and Lisinopril    SURGICAL HISTORY       Past Surgical History:   Procedure Laterality Date    ABDOMINAL AORTIC ANEURYSM REPAIR      BRONCHOSCOPY N/A 10/6/2022    BRONCHOSCOPY performed by Theodore Ng MD at [facility]    DIALYSIS FISTULA CREATION

## 2025-07-11 NOTE — PROGRESS NOTES
Spiritual Health History and Assessment/Progress Note  [facility]    (P) Loneliness/Social Isolation,  ,  ,      Name: Trav Jennings MRN: [id number]    Age: 60 y.o.     Sex: male   Language: English   Moravian: Jewish   Fluid overload     Date: 7/11/2025            Total Time Calculated: (P) 6 min              Spiritual Assessment began in [facility]        Referral/Consult From: (P) Nurse   Encounter Overview/Reason: (P) Loneliness/Social Isolation  Service Provided For: (P) Patient    Elizabeth, Belief, Meaning:   Patient identifies as spiritual  Family/Friends No family/friends present      Importance and Influence:  Patient has spiritual/personal beliefs that influence decisions regarding their health  Family/Friends No family/friends present    Community:  Patient Other: None at this time  Family/Friends No family/friends present    Assessment and Plan of Care:   During this encounter, patient told me he receive dialysis for his kidney three times a week. Patient is . Patient and his wife have a 15 years old child. Patient is is receptive to a Out patient  referral to follow up with patient after his discharge. I am referring him to our OP  to follow up for continue spiritual and emotional support after patient is discharged. Spiritual Health  team will continue to provide support to patient and family.     Patient Interventions include: Facilitated expression of thoughts and feelings  Family/Friends Interventions include: No family/friends present    Patient Plan of Care: Spiritual Care available upon further referral  Family/Friends Plan of Care: No family/friends present    Electronically signed by FLAVIA Peters on 7/11/2025 at 2:16 PM

## 2025-07-12 VITALS
RESPIRATION RATE: 20 BRPM | BODY MASS INDEX: 25.25 KG/M2 | WEIGHT: 203.04 LBS | HEIGHT: 75 IN | OXYGEN SATURATION: 97 % | HEART RATE: 79 BPM | DIASTOLIC BLOOD PRESSURE: 77 MMHG | TEMPERATURE: 98 F | SYSTOLIC BLOOD PRESSURE: 153 MMHG

## 2025-07-12 LAB
ANION GAP SERPL CALC-SCNC: 12 MEQ/L (ref 8–16)
BUN SERPL-MCNC: 32 MG/DL (ref 8–23)
CALCIUM SERPL-MCNC: 8.8 MG/DL (ref 8.6–10)
CHLORIDE SERPL-SCNC: 104 MEQ/L (ref 98–111)
CO2 SERPL-SCNC: 23 MEQ/L (ref 22–29)
CREAT SERPL-MCNC: 7.2 MG/DL (ref 0.7–1.2)
GFR SERPL CREATININE-BSD FRML MDRD: 8 ML/MIN/1.73M2
GLUCOSE SERPL-MCNC: 87 MG/DL (ref 74–109)
MAGNESIUM SERPL-MCNC: 2.5 MG/DL (ref 1.6–2.6)
POTASSIUM SERPL-SCNC: 5.5 MEQ/L (ref 3.5–5.2)
SODIUM SERPL-SCNC: 139 MEQ/L (ref 135–145)

## 2025-07-12 PROCEDURE — 80048 BASIC METABOLIC PNL TOTAL CA: CPT

## 2025-07-12 PROCEDURE — G0378 HOSPITAL OBSERVATION PER HR: HCPCS

## 2025-07-12 PROCEDURE — 99239 HOSP IP/OBS DSCHRG MGMT >30: CPT | Performed by: OPHTHALMOLOGY

## 2025-07-12 PROCEDURE — 90935 HEMODIALYSIS ONE EVALUATION: CPT

## 2025-07-12 PROCEDURE — 36415 COLL VENOUS BLD VENIPUNCTURE: CPT

## 2025-07-12 PROCEDURE — 83735 ASSAY OF MAGNESIUM: CPT

## 2025-07-12 PROCEDURE — 99232 SBSQ HOSP IP/OBS MODERATE 35: CPT | Performed by: INTERNAL MEDICINE

## 2025-07-12 PROCEDURE — 6370000000 HC RX 637 (ALT 250 FOR IP)

## 2025-07-12 RX ADMIN — FOLIC ACID 1 MG: 1 TABLET ORAL at 12:18

## 2025-07-12 RX ADMIN — APIXABAN 2.5 MG: 2.5 TABLET, FILM COATED ORAL at 12:18

## 2025-07-12 RX ADMIN — AMLODIPINE BESYLATE 5 MG: 5 TABLET ORAL at 12:18

## 2025-07-12 RX ADMIN — LABETALOL HYDROCHLORIDE 100 MG: 100 TABLET, FILM COATED ORAL at 12:18

## 2025-07-12 RX ADMIN — ISOSORBIDE MONONITRATE 120 MG: 60 TABLET, EXTENDED RELEASE ORAL at 12:18

## 2025-07-12 RX ADMIN — ASPIRIN 81 MG: 81 TABLET, COATED ORAL at 12:18

## 2025-07-12 RX ADMIN — HYDRALAZINE HYDROCHLORIDE 100 MG: 50 TABLET ORAL at 05:45

## 2025-07-12 RX ADMIN — CLONIDINE HYDROCHLORIDE 0.3 MG: 0.2 TABLET ORAL at 12:18

## 2025-07-12 RX ADMIN — AMIODARONE HYDROCHLORIDE 200 MG: 200 TABLET ORAL at 12:18

## 2025-07-12 RX ADMIN — ATORVASTATIN CALCIUM 40 MG: 40 TABLET, FILM COATED ORAL at 12:18

## 2025-07-12 NOTE — FLOWSHEET NOTE
07/12/25 0729 07/12/25 1147   Vital Signs   BP (!) 147/75 (!) 169/83   Temp 98.8 °F (37.1 °C) 97.8 °F (36.6 °C)   Pulse 75 83   Respirations 17 23   SpO2 98 % 96 %   Weight - Scale 95.1 kg (209 lb 10.5 oz) 92.1 kg (203 lb 0.7 oz)   Weight Method Standing scale Estimated   Percent Weight Change 0 -3.15   Post-Hemodialysis Assessment   Post-Treatment Procedures  --  Blood returned;Access bleeding time < 10 minutes   Machine Disinfection Process  --  Acid/Vinegar Clean;Heat Disinfect;Exterior Machine Disinfection   Blood Volume Processed (Liters)  --  104 L   Dialyzer Clearance  --  Moderately streaked   Duration of Treatment (minutes)  --  240 minutes   Heparin Amount Administered During Treatment (mL)  --  0 mL   Hemodialysis Intake (ml)  --  300 ml   Hemodialysis Output (ml)  --  3300 ml   NET Removed (ml)  --  3000     4 hour treatment completed. 3L of fluid removed to get to EDW. Pressure held to site for 10 mins x2. Dressing clean/dry and intact upon leaving the unit. Report given to primary RN. Charting printed and placed in bin to be scanned into EMR.

## 2025-07-12 NOTE — PROGRESS NOTES
PAF: [id number] 3. EKG SR with 1st degree AV block.   Telemetry monitoring  Continue Eliquis, amiodarone, labetalol     History of AAA and aortic dissection  Follows with Dr. Garcia  outpatient.  Status post aortic endograft    Anemia of chronic disease     HLD:   Continue atorvastatin    History of Cocaine and alcohol abuse: Denies any recent use.     Tobacco use disorder: Reports smoking 1-2 cigarettes weekly.   Encourage cessation  Denies need for nicotine patch     PTSD: Not on any home medications. Currently feeling very down and depressed due to recent passing of several family members.   Spiritual Care consult  Psychiatry consult out patient      Possible discharge later today      DVT Prophylaxis: [] Lovenox / [] Heparin / [] SCDs / [x] Already on Systemic Anticoagulation / [] None     Expected discharge date: TBD  Disposition: Home  Code status: Full Code     ===================================================================    Chief Complaint: Shortness of breath and not feeling well    Subjective (past 24 hours):  rHarrison  Denies any shortness of breath at this time.  No further chest pain like he had on admission.  Did have a muscle cramp during dialysis.  No other complaints at this time.  Back to scheduled HD TTS    Medications:    Infusion Medications    sodium chloride      Scheduled Medications    hydrALAZINE  100 mg Oral q8h    sodium chloride flush  5-40 mL IntraVENous 2 times per day    aspirin  81 mg Oral Daily    cloNIDine  0.3 mg Oral TID    amiodarone  200 mg Oral Daily    amLODIPine  5 mg Oral Daily    apixaban  2.5 mg Oral BID    atorvastatin  40 mg Oral Daily    folic acid  1 mg Oral Daily    isosorbide mononitrate  120 mg Oral BID    labetalol  100 mg Oral q8h    PRN Meds: sodium chloride flush, sodium chloride, ondansetron **OR** ondansetron, polyethylene glycol, acetaminophen **OR** acetaminophen, hydrOXYzine pamoate    Exam:  BP (!) 147/75   Pulse 75   Temp 98.8 °F (37.1 °C)    Resp 17   Ht 1.905 m (6' 3\")   Wt 95.1 kg (209 lb 10.5 oz)   SpO2 98%   BMI 26.21 kg/m²   General: No distress, appears stated age.  Eyes:  PERRL. Conjunctivae/corneas clear.  HENT: Head normal appearing. Nares normal. Oral mucosa moist.  Hearing intact.   Neck: Supple, with full range of motion. Trachea midline.  Bruit auscultated  Respiratory:  Normal effort. Clear to auscultation, without rales or wheezes or rhonchi.  Cardiovascular: Normal rate, regular rhythm with normal S1/S2 without murmurs.    No lower extremity edema.   Abdomen: Soft, non-tender, non-distended with normal bowel sounds.  Musculoskeletal: No joint swelling or tenderness. Normal tone. No abnormal movements.   Skin: Warm and dry. No rashes or lesions.  Neurologic:  No focal sensory/motor deficits in the upper or lower extremities. Cranial nerves:  grossly non-focal 2-12.     Psychiatric: Alert and oriented, normal insight and thought content.   Capillary Refill: Brisk,< 3 seconds.  Peripheral Pulses: +2 palpable, equal bilaterally.       Labs/Radiology: See chart or assessment above.     Electronically signed by Alyssia Perez MD on 7/12/2025 at 8:34 AM

## 2025-07-12 NOTE — DISCHARGE SUMMARY
- CNP. To the specialist as arranged     Consultants:  nephrology    Procedures:  HD    Diagnostic Test:      Recent Labs     07/10/25  1421 07/11/25  0400   WBC 6.6 4.9   HGB 11.9* 11.0*   HCT 37.3* 35.6*    119*     Recent Labs     07/10/25  2322 07/11/25  0400 07/12/25  0507    139 139   K 4.9 5.8* 5.5*    103 104   CO2 26 22 23   BUN 44* 47* 32*   CREATININE 9.4* 10.1* 7.2*   CALCIUM 8.9 8.8 8.8     Recent Labs     07/10/25  1421 07/11/25  0400   AST 19 16   ALT 10 7*   BILIDIR  --  <0.1   BILITOT 0.5 0.4   ALKPHOS 103 90     Lab Results   Component Value Date/Time    AMYLASE 179 09/08/2023 05:41 AM    LIPASE 26.0 07/10/2025 02:21 PM     Recent Labs     07/10/25  1451   INR 1.03     No results for input(s): \"TROPONINT\" in the last 72 hours.  No results for input(s): \"BNP\" in the last 72 hours.  No results for input(s): \"LACTA\" in the last 72 hours.  Lab Results   Component Value Date/Time    LABA1C 5.5 12/25/2023 03:51 AM       Urinalysis:   Lab Results   Component Value Date/Time    NITRU NEGATIVE 04/03/2018 07:45 PM    WBCUA 2-4 04/03/2018 07:45 PM    BACTERIA NONE 04/03/2018 07:45 PM    RBCUA 5-10 04/03/2018 07:45 PM    BLOODU SMALL 04/03/2018 07:45 PM    GLUCOSEU NEGATIVE 04/03/2018 07:45 PM     CTA Chest W W/O Aortic Dissection   Final Result   1. The thoracic aortic stent graft placement across the aortic arch is   unchanged. The native aneurysm sac at the aortic arch is stable measuring   approximately 5.7 cm in diameter (previously measured 5.8 cm).. The dissection   flap within the distal descending thoracic aorta extending through the abdominal   aorta into the bilateral iliac vessels is unchanged in the configuration of the   thoracic and abdominal aorta in the aneurysm sac measurements at the level of   the aortic arch, descending thoracic aorta and within the infrarenal abdominal   aorta remain stable with representative measurements provided in the discussion.      2. Small    Continue amiodarone, amlodipine, clonidine, hydralazine, isosorbide, labetalol     Chronic respiratory failure: Reports using 3-4LNC at home at baseline. Currently on 4LNC.   Continue home O2 at 3-4LNC  Pulmonary hygiene: IS, C&DB     HFpEF: 6/4/25 Echo: EF 70-75%, hyperdynamic LV systolic function, increased ventricular mass, findings consistent with severe concentric hypertrophy, severe LVOT obstruction at rest, LVOT pressure gradient increases with Valsalva.   Daily weights, I&O  2L fluid restriction     NIDDM2: 12/25/23 A1c 5.5%. Not on any home medications.         PAF: [id number] 3. EKG SR with 1st degree AV block.   Telemetry monitoring  Continue Eliquis, amiodarone, labetalol     History of AAA and aortic dissection  Follows with Dr. Garcia  outpatient.  Status post aortic endograft     Anemia of chronic disease     HLD:   Continue atorvastatin     History of Cocaine and alcohol abuse: Denies any recent use.      Tobacco use disorder: Reports smoking 1-2 cigarettes weekly.   Encourage cessation  Denies need for nicotine patch     PTSD: Not on any home medications. Currently feeling very down and depressed due to recent passing of several family members.   Spiritual Care consult  Psychiatry consult out patient      Disposition: home    Condition: Stable    Time Spent: 35 minutes        Alyssia Perez MD, MD  Discharging Hospitalist

## 2025-07-12 NOTE — FLOWSHEET NOTE
Discharge instructions to pt using teach back method. Instructed to f/u with pcp in 1 week, instructed to f/u with psych per outpt referral. No med changes, no outpt labs added. Denies ques or concerns, verbalizes understanding. 1315:Taken to d-c lobby via wheelchair to [employer] cab transport.

## 2025-07-12 NOTE — PROGRESS NOTES
[facility]   Nephrology progress note  7/12/2025, 10:06 AM      Pt Name:    Trav Jennings  MRN:     [id number]     YOB: 1965  Admit Date:    7/10/2025  1:57 PM    Chief Complaint: Nephrology following for ESRD on hemodialysis.    Subjective:  Patient seen and examined  No chest pain or shortness of breath  Resting comfortably    Objective:  24HR INTAKE/OUTPUT:    Intake/Output Summary (Last 24 hours) at 7/12/2025 1006  Last data filed at 7/12/2025 0545  Gross per 24 hour   Intake 1430 ml   Output 4036 ml   Net -2606 ml      Admission weight: 102.8 kg (226 lb 10.1 oz)  Wt Readings from Last 3 Encounters:   07/12/25 95.1 kg (209 lb 10.5 oz)   06/21/25 93.2 kg (205 lb 7.5 oz)   06/05/25 94.3 kg (207 lb 14.3 oz)        Vitals :   Vitals:    07/12/25 0120 07/12/25 0329 07/12/25 0545 07/12/25 0729   BP: (!) 113/52 (!) 144/73 (!) 144/68 (!) 147/75   Pulse:  70 68 75   Resp:  22  17   Temp:  97.9 °F (36.6 °C)  98.8 °F (37.1 °C)   TempSrc:  Oral     SpO2:  99%  98%   Weight:  95.1 kg (209 lb 10.5 oz)  95.1 kg (209 lb 10.5 oz)   Height:           Physical examination  General Appearance:  Well developed. No distress  Mouth/Throat:  Oral mucosa moist  Neck:  Supple, no JVD  Lungs:  Breath sounds: clear  Heart::  S1,S2 heard  Abdomen:  Soft, non - tender  Musculoskeletal:  Edema -no edema    Medications:  Infusion:    sodium chloride       Meds:    hydrALAZINE  100 mg Oral q8h    sodium chloride flush  5-40 mL IntraVENous 2 times per day    aspirin  81 mg Oral Daily    cloNIDine  0.3 mg Oral TID    amiodarone  200 mg Oral Daily    amLODIPine  5 mg Oral Daily    apixaban  2.5 mg Oral BID    atorvastatin  40 mg Oral Daily    folic acid  1 mg Oral Daily    isosorbide mononitrate  120 mg Oral BID    labetalol  100 mg Oral q8h       Lab Data :  CBC:   Recent Labs     07/10/25  1421 07/11/25  0400   WBC 6.6 4.9   HGB 11.9* 11.0*   HCT 37.3* 35.6*    119*     CMP:  Recent Labs

## 2025-07-12 NOTE — PLAN OF CARE
Problem: Chronic Conditions and Co-morbidities  Goal: Patient's chronic conditions and co-morbidity symptoms are monitored and maintained or improved  7/11/2025 2111 by Alex Huertas RN  Outcome: Progressing  Flowsheets (Taken 7/11/2025 2111)  Care Plan - Patient's Chronic Conditions and Co-Morbidity Symptoms are Monitored and Maintained or Improved:   Monitor and assess patient's chronic conditions and comorbid symptoms for stability, deterioration, or improvement   Collaborate with multidisciplinary team to address chronic and comorbid conditions and prevent exacerbation or deterioration     Problem: Discharge Planning  Goal: Discharge to home or other facility with appropriate resources  7/11/2025 2111 by Alex Huertas RN  Outcome: Progressing  Flowsheets (Taken 7/11/2025 2111)  Discharge to home or other facility with appropriate resources:   Identify barriers to discharge with patient and caregiver   Identify discharge learning needs (meds, wound care, etc)   Refer to discharge planning if patient needs post-hospital services based on physician order or complex needs related to functional status, cognitive ability or social support system     Problem: Safety - Adult  Goal: Free from fall injury  7/11/2025 2111 by Alex Huertas RN  Outcome: Progressing  Flowsheets (Taken 7/11/2025 2111)  Free From Fall Injury: Instruct family/caregiver on patient safety     Problem: ABCDS Injury Assessment  Goal: Absence of physical injury  7/11/2025 2111 by Alex Huertas RN  Outcome: Progressing  Flowsheets (Taken 7/11/2025 2111)  Absence of Physical Injury: Implement safety measures based on patient assessment     Problem: Pain  Goal: Verbalizes/displays adequate comfort level or baseline comfort level  7/11/2025 2111 by Alex Huertas RN  Outcome: Progressing  Flowsheets (Taken 7/11/2025 2111)  Verbalizes/displays adequate comfort level or baseline comfort level: Encourage patient to monitor pain and

## 2025-08-14 ENCOUNTER — APPOINTMENT (OUTPATIENT)
Dept: GENERAL RADIOLOGY | Age: 60
DRG: 304 | End: 2025-08-14
Payer: MEDICARE

## 2025-08-14 ENCOUNTER — APPOINTMENT (OUTPATIENT)
Dept: CT IMAGING | Age: 60
DRG: 304 | End: 2025-08-14
Payer: MEDICARE

## 2025-08-14 ENCOUNTER — APPOINTMENT (OUTPATIENT)
Dept: ULTRASOUND IMAGING | Age: 60
DRG: 304 | End: 2025-08-14
Payer: MEDICARE

## 2025-08-14 ENCOUNTER — HOSPITAL ENCOUNTER (INPATIENT)
Age: 60
LOS: 3 days | Discharge: HOME | DRG: 304 | End: 2025-08-17
Attending: EMERGENCY MEDICINE | Admitting: FAMILY MEDICINE
Payer: MEDICARE

## 2025-08-14 DIAGNOSIS — I20.0 UNSTABLE ANGINA (HCC): Primary | ICD-10-CM

## 2025-08-14 DIAGNOSIS — Z91.199 NONCOMPLIANCE: ICD-10-CM

## 2025-08-14 DIAGNOSIS — I16.9 HYPERTENSIVE CRISIS: ICD-10-CM

## 2025-08-14 LAB
ALBUMIN SERPL BCG-MCNC: 4.3 G/DL (ref 3.4–4.9)
ALP SERPL-CCNC: 88 U/L (ref 40–129)
ALT SERPL W/O P-5'-P-CCNC: 14 U/L (ref 10–50)
ANION GAP SERPL CALC-SCNC: 22 MEQ/L (ref 8–16)
AST SERPL-CCNC: 24 U/L (ref 10–50)
BASE EXCESS BLDA CALC-SCNC: 1.2 MMOL/L (ref -2–3)
BASOPHILS ABSOLUTE: 0 THOU/MM3 (ref 0–0.1)
BASOPHILS NFR BLD AUTO: 0.4 %
BILIRUB CONJ SERPL-MCNC: 0.2 MG/DL (ref 0–0.2)
BILIRUB SERPL-MCNC: 0.5 MG/DL (ref 0.3–1.2)
BUN SERPL-MCNC: 71 MG/DL (ref 8–23)
CA-I BLD ISE-SCNC: 1.05 MMOL/L (ref 1.12–1.32)
CALCIUM SERPL-MCNC: 9.4 MG/DL (ref 8.5–10.5)
CHLORIDE BLD-SCNC: 111 MEQ/L (ref 98–109)
CHLORIDE SERPL-SCNC: 97 MEQ/L (ref 98–111)
CO2 SERPL-SCNC: 22 MEQ/L (ref 22–29)
COLLECTED BY:: ABNORMAL
CREAT SERPL-MCNC: 12.6 MG/DL (ref 0.7–1.2)
DEPRECATED RDW RBC AUTO: 54 FL (ref 35–45)
DEVICE: ABNORMAL
EOSINOPHIL NFR BLD AUTO: 1.8 %
EOSINOPHILS ABSOLUTE: 0.1 THOU/MM3 (ref 0–0.4)
ERYTHROCYTE [DISTWIDTH] IN BLOOD BY AUTOMATED COUNT: 14.6 % (ref 11.5–14.5)
ETHANOL SERPL-MCNC: < 0.01 % (ref 0–0.08)
FIO2 ON VENT O2 ANALYZER: 4 %
GFR SERPL CREATININE-BSD FRML MDRD: 4 ML/MIN/1.73M2
GLUCOSE BLD-MCNC: 91 MG/DL (ref 70–108)
GLUCOSE SERPL-MCNC: 92 MG/DL (ref 74–109)
HCO3 BLDA-SCNC: 25 MMOL/L (ref 23–28)
HCT VFR BLD AUTO: 35.9 % (ref 42–52)
HGB BLD-MCNC: 11.3 GM/DL (ref 14–18)
IMM GRANULOCYTES # BLD AUTO: 0.01 THOU/MM3 (ref 0–0.07)
IMM GRANULOCYTES NFR BLD AUTO: 0.1 %
LACTATE BLD-SCNC: 0.9 MMOL/L (ref 0.5–1.9)
LIPASE SERPL-CCNC: 246 U/L (ref 13–60)
LYMPHOCYTES ABSOLUTE: 1.3 THOU/MM3 (ref 1–4.8)
LYMPHOCYTES NFR BLD AUTO: 17 %
MAGNESIUM SERPL-MCNC: 2.6 MG/DL (ref 1.6–2.6)
MCH RBC QN AUTO: 31.7 PG (ref 26–33)
MCHC RBC AUTO-ENTMCNC: 31.5 GM/DL (ref 32.2–35.5)
MCV RBC AUTO: 100.6 FL (ref 80–94)
MONOCYTES ABSOLUTE: 0.7 THOU/MM3 (ref 0.4–1.3)
MONOCYTES NFR BLD AUTO: 9.3 %
NEUTROPHILS ABSOLUTE: 5.6 THOU/MM3 (ref 1.8–7.7)
NEUTROPHILS NFR BLD AUTO: 71.4 %
NRBC BLD AUTO-RTO: 0 /100 WBC
OSMOLALITY SERPL CALC.SUM OF ELEC: 301.7 MOSMOL/KG (ref 275–300)
PCO2 TEMP ADJ BLDMV: 38 MMHG (ref 41–51)
PH BLDMV: 7.44 [PH] (ref 7.31–7.41)
PLATELET # BLD AUTO: 158 THOU/MM3 (ref 130–400)
PMV BLD AUTO: 10.3 FL (ref 9.4–12.4)
PO2 BLDMV: 36 MMHG (ref 25–40)
POTASSIUM BLD-SCNC: 4.4 MEQ/L (ref 3.5–4.9)
POTASSIUM SERPL-SCNC: 4.7 MEQ/L (ref 3.5–5.2)
PROT SERPL-MCNC: 8.2 G/DL (ref 6.4–8.3)
RBC # BLD AUTO: 3.57 MILL/MM3 (ref 4.7–6.1)
SAO2 % BLDMV: 71 %
SITE: ABNORMAL
SODIUM BLD-SCNC: 137 MEQ/L (ref 138–146)
SODIUM SERPL-SCNC: 141 MEQ/L (ref 135–145)
TROPONIN, HIGH SENSITIVITY: 119 NG/L (ref 0–12)
TROPONIN, HIGH SENSITIVITY: 123 NG/L (ref 0–12)
VENTILATION MODE VENT: ABNORMAL
WBC # BLD AUTO: 7.8 THOU/MM3 (ref 4.8–10.8)

## 2025-08-14 PROCEDURE — 83615 LACTATE (LD) (LDH) ENZYME: CPT

## 2025-08-14 PROCEDURE — 84295 ASSAY OF SERUM SODIUM: CPT

## 2025-08-14 PROCEDURE — 71275 CT ANGIOGRAPHY CHEST: CPT

## 2025-08-14 PROCEDURE — 84484 ASSAY OF TROPONIN QUANT: CPT

## 2025-08-14 PROCEDURE — 84145 PROCALCITONIN (PCT): CPT

## 2025-08-14 PROCEDURE — 84132 ASSAY OF SERUM POTASSIUM: CPT

## 2025-08-14 PROCEDURE — 93005 ELECTROCARDIOGRAM TRACING: CPT | Performed by: EMERGENCY MEDICINE

## 2025-08-14 PROCEDURE — 80053 COMPREHEN METABOLIC PANEL: CPT

## 2025-08-14 PROCEDURE — 83036 HEMOGLOBIN GLYCOSYLATED A1C: CPT

## 2025-08-14 PROCEDURE — 71045 X-RAY EXAM CHEST 1 VIEW: CPT

## 2025-08-14 PROCEDURE — 99223 1ST HOSP IP/OBS HIGH 75: CPT | Performed by: NURSE PRACTITIONER

## 2025-08-14 PROCEDURE — 83605 ASSAY OF LACTIC ACID: CPT

## 2025-08-14 PROCEDURE — 82435 ASSAY OF BLOOD CHLORIDE: CPT

## 2025-08-14 PROCEDURE — 82330 ASSAY OF CALCIUM: CPT

## 2025-08-14 PROCEDURE — 82077 ASSAY SPEC XCP UR&BREATH IA: CPT

## 2025-08-14 PROCEDURE — 82150 ASSAY OF AMYLASE: CPT

## 2025-08-14 PROCEDURE — 82947 ASSAY GLUCOSE BLOOD QUANT: CPT

## 2025-08-14 PROCEDURE — 2700000000 HC OXYGEN THERAPY PER DAY

## 2025-08-14 PROCEDURE — 82803 BLOOD GASES ANY COMBINATION: CPT

## 2025-08-14 PROCEDURE — 84100 ASSAY OF PHOSPHORUS: CPT

## 2025-08-14 PROCEDURE — 2140000000 HC CCU INTERMEDIATE R&B

## 2025-08-14 PROCEDURE — 96376 TX/PRO/DX INJ SAME DRUG ADON: CPT

## 2025-08-14 PROCEDURE — 84443 ASSAY THYROID STIM HORMONE: CPT

## 2025-08-14 PROCEDURE — 2580000003 HC RX 258: Performed by: EMERGENCY MEDICINE

## 2025-08-14 PROCEDURE — 6360000004 HC RX CONTRAST MEDICATION: Performed by: EMERGENCY MEDICINE

## 2025-08-14 PROCEDURE — 76705 ECHO EXAM OF ABDOMEN: CPT

## 2025-08-14 PROCEDURE — 82248 BILIRUBIN DIRECT: CPT

## 2025-08-14 PROCEDURE — 85025 COMPLETE CBC W/AUTO DIFF WBC: CPT

## 2025-08-14 PROCEDURE — 96366 THER/PROPH/DIAG IV INF ADDON: CPT

## 2025-08-14 PROCEDURE — 96375 TX/PRO/DX INJ NEW DRUG ADDON: CPT

## 2025-08-14 PROCEDURE — 83880 ASSAY OF NATRIURETIC PEPTIDE: CPT

## 2025-08-14 PROCEDURE — 96365 THER/PROPH/DIAG IV INF INIT: CPT

## 2025-08-14 PROCEDURE — 6360000002 HC RX W HCPCS: Performed by: EMERGENCY MEDICINE

## 2025-08-14 PROCEDURE — 83735 ASSAY OF MAGNESIUM: CPT

## 2025-08-14 PROCEDURE — 84478 ASSAY OF TRIGLYCERIDES: CPT

## 2025-08-14 PROCEDURE — 86140 C-REACTIVE PROTEIN: CPT

## 2025-08-14 PROCEDURE — 82800 BLOOD PH: CPT

## 2025-08-14 PROCEDURE — 99285 EMERGENCY DEPT VISIT HI MDM: CPT

## 2025-08-14 PROCEDURE — 83690 ASSAY OF LIPASE: CPT

## 2025-08-14 PROCEDURE — 36415 COLL VENOUS BLD VENIPUNCTURE: CPT

## 2025-08-14 RX ORDER — HYDRALAZINE HYDROCHLORIDE 20 MG/ML
10 INJECTION INTRAMUSCULAR; INTRAVENOUS ONCE
Status: COMPLETED | OUTPATIENT
Start: 2025-08-14 | End: 2025-08-14

## 2025-08-14 RX ORDER — LORAZEPAM 2 MG/ML
1 INJECTION INTRAMUSCULAR
Status: DISCONTINUED | OUTPATIENT
Start: 2025-08-14 | End: 2025-08-14

## 2025-08-14 RX ORDER — ONDANSETRON 2 MG/ML
4 INJECTION INTRAMUSCULAR; INTRAVENOUS EVERY 6 HOURS PRN
Status: DISCONTINUED | OUTPATIENT
Start: 2025-08-14 | End: 2025-08-15

## 2025-08-14 RX ORDER — ACETAMINOPHEN 325 MG/1
650 TABLET ORAL EVERY 6 HOURS PRN
Status: DISCONTINUED | OUTPATIENT
Start: 2025-08-14 | End: 2025-08-17 | Stop reason: HOSPADM

## 2025-08-14 RX ORDER — ATORVASTATIN CALCIUM 40 MG/1
40 TABLET, FILM COATED ORAL DAILY
Status: DISCONTINUED | OUTPATIENT
Start: 2025-08-15 | End: 2025-08-17 | Stop reason: HOSPADM

## 2025-08-14 RX ORDER — ACETAMINOPHEN 650 MG/1
650 SUPPOSITORY RECTAL EVERY 6 HOURS PRN
Status: DISCONTINUED | OUTPATIENT
Start: 2025-08-14 | End: 2025-08-17 | Stop reason: HOSPADM

## 2025-08-14 RX ORDER — LORAZEPAM 1 MG/1
1 TABLET ORAL
Status: DISCONTINUED | OUTPATIENT
Start: 2025-08-14 | End: 2025-08-17 | Stop reason: HOSPADM

## 2025-08-14 RX ORDER — SODIUM CHLORIDE 0.9 % (FLUSH) 0.9 %
5-40 SYRINGE (ML) INJECTION PRN
Status: DISCONTINUED | OUTPATIENT
Start: 2025-08-14 | End: 2025-08-17 | Stop reason: HOSPADM

## 2025-08-14 RX ORDER — LORAZEPAM 2 MG/ML
2 INJECTION INTRAMUSCULAR ONCE
Status: DISCONTINUED | OUTPATIENT
Start: 2025-08-14 | End: 2025-08-14

## 2025-08-14 RX ORDER — SODIUM CHLORIDE 0.9 % (FLUSH) 0.9 %
5-40 SYRINGE (ML) INJECTION EVERY 12 HOURS SCHEDULED
Status: DISCONTINUED | OUTPATIENT
Start: 2025-08-15 | End: 2025-08-17 | Stop reason: HOSPADM

## 2025-08-14 RX ORDER — POLYETHYLENE GLYCOL 3350 17 G/17G
17 POWDER, FOR SOLUTION ORAL DAILY PRN
Status: DISCONTINUED | OUTPATIENT
Start: 2025-08-14 | End: 2025-08-17 | Stop reason: HOSPADM

## 2025-08-14 RX ORDER — CALCIUM GLUCONATE 98 MG/ML
1000 INJECTION, SOLUTION INTRAVENOUS ONCE
Status: COMPLETED | OUTPATIENT
Start: 2025-08-14 | End: 2025-08-14

## 2025-08-14 RX ORDER — SODIUM CHLORIDE 9 MG/ML
INJECTION, SOLUTION INTRAVENOUS PRN
Status: DISCONTINUED | OUTPATIENT
Start: 2025-08-14 | End: 2025-08-17 | Stop reason: HOSPADM

## 2025-08-14 RX ORDER — ONDANSETRON 4 MG/1
4 TABLET, ORALLY DISINTEGRATING ORAL EVERY 8 HOURS PRN
Status: DISCONTINUED | OUTPATIENT
Start: 2025-08-14 | End: 2025-08-15

## 2025-08-14 RX ORDER — LORAZEPAM 2 MG/ML
3 INJECTION INTRAMUSCULAR
Status: DISCONTINUED | OUTPATIENT
Start: 2025-08-14 | End: 2025-08-14

## 2025-08-14 RX ORDER — LORAZEPAM 2 MG/1
2 TABLET ORAL
Status: DISCONTINUED | OUTPATIENT
Start: 2025-08-14 | End: 2025-08-17 | Stop reason: HOSPADM

## 2025-08-14 RX ORDER — ASPIRIN 81 MG/1
81 TABLET ORAL DAILY
Status: DISCONTINUED | OUTPATIENT
Start: 2025-08-15 | End: 2025-08-17 | Stop reason: HOSPADM

## 2025-08-14 RX ORDER — MAGNESIUM SULFATE 1 G/100ML
1000 INJECTION INTRAVENOUS ONCE
Status: COMPLETED | OUTPATIENT
Start: 2025-08-14 | End: 2025-08-14

## 2025-08-14 RX ORDER — LORAZEPAM 2 MG/1
4 TABLET ORAL
Status: DISCONTINUED | OUTPATIENT
Start: 2025-08-14 | End: 2025-08-17 | Stop reason: HOSPADM

## 2025-08-14 RX ORDER — FOLIC ACID 1 MG/1
1 TABLET ORAL DAILY
Status: DISCONTINUED | OUTPATIENT
Start: 2025-08-15 | End: 2025-08-17 | Stop reason: HOSPADM

## 2025-08-14 RX ORDER — LORAZEPAM 2 MG/ML
4 INJECTION INTRAMUSCULAR
Status: DISCONTINUED | OUTPATIENT
Start: 2025-08-14 | End: 2025-08-14

## 2025-08-14 RX ORDER — LORAZEPAM 2 MG/ML
2 INJECTION INTRAMUSCULAR
Status: DISCONTINUED | OUTPATIENT
Start: 2025-08-14 | End: 2025-08-14

## 2025-08-14 RX ORDER — AMIODARONE HYDROCHLORIDE 200 MG/1
200 TABLET ORAL DAILY
Status: DISCONTINUED | OUTPATIENT
Start: 2025-08-15 | End: 2025-08-17 | Stop reason: HOSPADM

## 2025-08-14 RX ORDER — MULTIVITAMIN WITH IRON
1 TABLET ORAL DAILY
Status: DISCONTINUED | OUTPATIENT
Start: 2025-08-15 | End: 2025-08-17 | Stop reason: HOSPADM

## 2025-08-14 RX ORDER — IOPAMIDOL 755 MG/ML
80 INJECTION, SOLUTION INTRAVASCULAR
Status: COMPLETED | OUTPATIENT
Start: 2025-08-14 | End: 2025-08-14

## 2025-08-14 RX ORDER — LANOLIN ALCOHOL/MO/W.PET/CERES
100 CREAM (GRAM) TOPICAL DAILY
Status: DISCONTINUED | OUTPATIENT
Start: 2025-08-15 | End: 2025-08-17 | Stop reason: HOSPADM

## 2025-08-14 RX ORDER — HEPARIN SODIUM 5000 [USP'U]/ML
5000 INJECTION, SOLUTION INTRAVENOUS; SUBCUTANEOUS EVERY 8 HOURS SCHEDULED
Status: DISCONTINUED | OUTPATIENT
Start: 2025-08-15 | End: 2025-08-14

## 2025-08-14 RX ADMIN — HYDRALAZINE HYDROCHLORIDE 10 MG: 20 INJECTION INTRAMUSCULAR; INTRAVENOUS at 20:09

## 2025-08-14 RX ADMIN — MAGNESIUM SULFATE HEPTAHYDRATE 1000 MG: 1 INJECTION, SOLUTION INTRAVENOUS at 18:38

## 2025-08-14 RX ADMIN — HYDRALAZINE HYDROCHLORIDE 10 MG: 20 INJECTION INTRAMUSCULAR; INTRAVENOUS at 20:49

## 2025-08-14 RX ADMIN — SODIUM CHLORIDE 5 MG/HR: 9 INJECTION, SOLUTION INTRAVENOUS at 21:23

## 2025-08-14 RX ADMIN — CALCIUM GLUCONATE 1000 MG: 98 INJECTION INTRAVENOUS at 18:32

## 2025-08-14 RX ADMIN — MAGNESIUM SULFATE HEPTAHYDRATE 1000 MG: 1 INJECTION, SOLUTION INTRAVENOUS at 21:08

## 2025-08-14 RX ADMIN — IOPAMIDOL 80 ML: 755 INJECTION, SOLUTION INTRAVENOUS at 19:33

## 2025-08-15 ENCOUNTER — APPOINTMENT (OUTPATIENT)
Dept: CT IMAGING | Age: 60
DRG: 304 | End: 2025-08-15
Payer: MEDICARE

## 2025-08-15 PROBLEM — I20.0 UNSTABLE ANGINA (HCC): Status: ACTIVE | Noted: 2025-08-15

## 2025-08-15 PROBLEM — Z91.199 NONCOMPLIANCE: Status: ACTIVE | Noted: 2025-08-15

## 2025-08-15 LAB
ALBUMIN SERPL BCG-MCNC: 3.9 G/DL (ref 3.4–4.9)
ALP SERPL-CCNC: 80 U/L (ref 40–129)
ALT SERPL W/O P-5'-P-CCNC: 11 U/L (ref 10–50)
AMYLASE SERPL-CCNC: 222 U/L (ref 28–100)
ANION GAP SERPL CALC-SCNC: 18 MEQ/L (ref 8–16)
AST SERPL-CCNC: 22 U/L (ref 10–50)
BILIRUB SERPL-MCNC: 0.5 MG/DL (ref 0.3–1.2)
BUN SERPL-MCNC: 73 MG/DL (ref 8–23)
CA-I BLD ISE-SCNC: 1.03 MMOL/L (ref 1.12–1.32)
CALCIUM SERPL-MCNC: 8.7 MG/DL (ref 8.5–10.5)
CHLORIDE SERPL-SCNC: 97 MEQ/L (ref 98–111)
CK SERPL-CCNC: 250 U/L (ref 39–308)
CO2 SERPL-SCNC: 22 MEQ/L (ref 22–29)
CREAT SERPL-MCNC: 13.3 MG/DL (ref 0.7–1.2)
CRP SERPL-MCNC: < 0.3 MG/DL (ref 0–0.5)
DEPRECATED MEAN GLUCOSE BLD GHB EST-ACNC: 99 MG/DL (ref 70–126)
EKG ATRIAL RATE: 90 BPM
EKG ATRIAL RATE: 94 BPM
EKG P AXIS: 62 DEGREES
EKG P AXIS: 69 DEGREES
EKG P-R INTERVAL: 206 MS
EKG P-R INTERVAL: 214 MS
EKG Q-T INTERVAL: 420 MS
EKG Q-T INTERVAL: 424 MS
EKG QRS DURATION: 108 MS
EKG QRS DURATION: 110 MS
EKG QTC CALCULATION (BAZETT): 518 MS
EKG QTC CALCULATION (BAZETT): 525 MS
EKG R AXIS: -48 DEGREES
EKG R AXIS: -52 DEGREES
EKG T AXIS: 89 DEGREES
EKG T AXIS: 90 DEGREES
EKG VENTRICULAR RATE: 90 BPM
EKG VENTRICULAR RATE: 94 BPM
GFR SERPL CREATININE-BSD FRML MDRD: 4 ML/MIN/1.73M2
GLUCOSE SERPL-MCNC: 109 MG/DL (ref 74–109)
HBA1C MFR BLD HPLC: 5.3 % (ref 4–6)
HCT VFR BLD AUTO: 36.9 % (ref 42–52)
HGB BLD-MCNC: 11.3 GM/DL (ref 14–18)
LACTATE SERPL-SCNC: 0.8 MMOL/L (ref 0.5–2.2)
LDH SERPL L TO P-CCNC: 257 U/L (ref 135–225)
LIPASE SERPL-CCNC: 178 U/L (ref 13–60)
LIPASE SERPL-CCNC: 96 U/L (ref 13–60)
MAGNESIUM SERPL-MCNC: 3.7 MG/DL (ref 1.6–2.6)
NT-PROBNP SERPL IA-MCNC: ABNORMAL PG/ML (ref 0–124)
PH BLDV: 7.31 [PH] (ref 7.31–7.41)
PHOSPHATE SERPL-MCNC: 5.9 MG/DL (ref 2.5–4.5)
POTASSIUM SERPL-SCNC: 4.9 MEQ/L (ref 3.5–5.2)
PROCALCITONIN SERPL IA-MCNC: 0.54 NG/ML (ref 0.01–0.09)
PROT SERPL-MCNC: 7.4 G/DL (ref 6.4–8.3)
SODIUM SERPL-SCNC: 137 MEQ/L (ref 135–145)
TRIGL SERPL-MCNC: 81 MG/DL (ref 0–199)
TSH SERPL DL<=0.05 MIU/L-ACNC: 1.09 UIU/ML (ref 0.27–4.2)

## 2025-08-15 PROCEDURE — 6360000002 HC RX W HCPCS: Performed by: NURSE PRACTITIONER

## 2025-08-15 PROCEDURE — 85018 HEMOGLOBIN: CPT

## 2025-08-15 PROCEDURE — 99233 SBSQ HOSP IP/OBS HIGH 50: CPT | Performed by: INTERNAL MEDICINE

## 2025-08-15 PROCEDURE — 2580000003 HC RX 258: Performed by: NURSE PRACTITIONER

## 2025-08-15 PROCEDURE — 5A1D70Z PERFORMANCE OF URINARY FILTRATION, INTERMITTENT, LESS THAN 6 HOURS PER DAY: ICD-10-PCS | Performed by: FAMILY MEDICINE

## 2025-08-15 PROCEDURE — 83690 ASSAY OF LIPASE: CPT

## 2025-08-15 PROCEDURE — 6360000004 HC RX CONTRAST MEDICATION: Performed by: NURSE PRACTITIONER

## 2025-08-15 PROCEDURE — 2500000003 HC RX 250 WO HCPCS: Performed by: NURSE PRACTITIONER

## 2025-08-15 PROCEDURE — 85014 HEMATOCRIT: CPT

## 2025-08-15 PROCEDURE — 82550 ASSAY OF CK (CPK): CPT

## 2025-08-15 PROCEDURE — 80053 COMPREHEN METABOLIC PANEL: CPT

## 2025-08-15 PROCEDURE — 6370000000 HC RX 637 (ALT 250 FOR IP): Performed by: INTERNAL MEDICINE

## 2025-08-15 PROCEDURE — 74177 CT ABD & PELVIS W/CONTRAST: CPT

## 2025-08-15 PROCEDURE — 36415 COLL VENOUS BLD VENIPUNCTURE: CPT

## 2025-08-15 PROCEDURE — 99223 1ST HOSP IP/OBS HIGH 75: CPT | Performed by: INTERNAL MEDICINE

## 2025-08-15 PROCEDURE — 2140000000 HC CCU INTERMEDIATE R&B

## 2025-08-15 PROCEDURE — 90935 HEMODIALYSIS ONE EVALUATION: CPT | Performed by: INTERNAL MEDICINE

## 2025-08-15 PROCEDURE — 90935 HEMODIALYSIS ONE EVALUATION: CPT

## 2025-08-15 PROCEDURE — 6370000000 HC RX 637 (ALT 250 FOR IP): Performed by: NURSE PRACTITIONER

## 2025-08-15 PROCEDURE — 93010 ELECTROCARDIOGRAM REPORT: CPT | Performed by: NUCLEAR MEDICINE

## 2025-08-15 RX ORDER — MULTIVITAMIN WITH IRON
500 TABLET ORAL DAILY
COMMUNITY

## 2025-08-15 RX ORDER — HYDRALAZINE HYDROCHLORIDE 50 MG/1
100 TABLET, FILM COATED ORAL EVERY 8 HOURS
Status: DISCONTINUED | OUTPATIENT
Start: 2025-08-15 | End: 2025-08-17 | Stop reason: HOSPADM

## 2025-08-15 RX ORDER — LABETALOL 100 MG/1
100 TABLET, FILM COATED ORAL EVERY 8 HOURS SCHEDULED
Status: DISCONTINUED | OUTPATIENT
Start: 2025-08-15 | End: 2025-08-17 | Stop reason: HOSPADM

## 2025-08-15 RX ORDER — IOPAMIDOL 755 MG/ML
80 INJECTION, SOLUTION INTRAVASCULAR
Status: COMPLETED | OUTPATIENT
Start: 2025-08-15 | End: 2025-08-15

## 2025-08-15 RX ORDER — ISOSORBIDE MONONITRATE 60 MG/1
120 TABLET, EXTENDED RELEASE ORAL 2 TIMES DAILY
Status: DISCONTINUED | OUTPATIENT
Start: 2025-08-15 | End: 2025-08-17 | Stop reason: HOSPADM

## 2025-08-15 RX ADMIN — AMIODARONE HYDROCHLORIDE 200 MG: 200 TABLET ORAL at 14:26

## 2025-08-15 RX ADMIN — ISOSORBIDE MONONITRATE 120 MG: 60 TABLET, EXTENDED RELEASE ORAL at 14:26

## 2025-08-15 RX ADMIN — SODIUM CHLORIDE, PRESERVATIVE FREE 10 ML: 5 INJECTION INTRAVENOUS at 20:38

## 2025-08-15 RX ADMIN — FOLIC ACID 1 MG: 1 TABLET ORAL at 14:27

## 2025-08-15 RX ADMIN — ATORVASTATIN CALCIUM 40 MG: 40 TABLET, FILM COATED ORAL at 14:26

## 2025-08-15 RX ADMIN — HYDRALAZINE HYDROCHLORIDE 100 MG: 50 TABLET ORAL at 14:26

## 2025-08-15 RX ADMIN — Medication 1 TABLET: at 14:27

## 2025-08-15 RX ADMIN — ASPIRIN 81 MG: 81 TABLET, COATED ORAL at 14:26

## 2025-08-15 RX ADMIN — NICARDIPINE HYDROCHLORIDE 15 MG/HR: 25 INJECTION INTRAVENOUS at 04:26

## 2025-08-15 RX ADMIN — IOPAMIDOL 80 ML: 755 INJECTION, SOLUTION INTRAVENOUS at 02:18

## 2025-08-15 RX ADMIN — Medication 100 MG: at 14:27

## 2025-08-15 RX ADMIN — NICARDIPINE HYDROCHLORIDE 7.5 MG/HR: 25 INJECTION INTRAVENOUS at 01:50

## 2025-08-15 RX ADMIN — APIXABAN 5 MG: 5 TABLET, FILM COATED ORAL at 20:38

## 2025-08-15 RX ADMIN — NICARDIPINE HYDROCHLORIDE 15 MG/HR: 25 INJECTION INTRAVENOUS at 09:41

## 2025-08-15 RX ADMIN — NICARDIPINE HYDROCHLORIDE 12.5 MG/HR: 25 INJECTION INTRAVENOUS at 07:00

## 2025-08-15 RX ADMIN — LABETALOL HYDROCHLORIDE 100 MG: 100 TABLET, FILM COATED ORAL at 14:27

## 2025-08-15 RX ADMIN — CLONIDINE HYDROCHLORIDE 0.3 MG: 0.2 TABLET ORAL at 14:27

## 2025-08-15 RX ADMIN — NICARDIPINE HYDROCHLORIDE 15 MG/HR: 25 INJECTION INTRAVENOUS at 14:34

## 2025-08-15 RX ADMIN — ISOSORBIDE MONONITRATE 120 MG: 60 TABLET, EXTENDED RELEASE ORAL at 20:39

## 2025-08-15 ASSESSMENT — PAIN SCALES - GENERAL
PAINLEVEL_OUTOF10: 0
PAINLEVEL_OUTOF10: 0

## 2025-08-15 ASSESSMENT — PATIENT HEALTH QUESTIONNAIRE - PHQ9
SUM OF ALL RESPONSES TO PHQ QUESTIONS 1-9: 2
1. LITTLE INTEREST OR PLEASURE IN DOING THINGS: SEVERAL DAYS
2. FEELING DOWN, DEPRESSED OR HOPELESS: SEVERAL DAYS

## 2025-08-16 LAB
ANION GAP SERPL CALC-SCNC: 17 MEQ/L (ref 8–16)
BASOPHILS ABSOLUTE: 0 THOU/MM3 (ref 0–0.1)
BASOPHILS NFR BLD AUTO: 0.5 %
BUN SERPL-MCNC: 42 MG/DL (ref 8–23)
CALCIUM SERPL-MCNC: 8.1 MG/DL (ref 8.5–10.5)
CHLORIDE SERPL-SCNC: 100 MEQ/L (ref 98–111)
CO2 SERPL-SCNC: 20 MEQ/L (ref 22–29)
CREAT SERPL-MCNC: 8 MG/DL (ref 0.7–1.2)
DEPRECATED RDW RBC AUTO: 54.3 FL (ref 35–45)
EOSINOPHIL NFR BLD AUTO: 5.1 %
EOSINOPHILS ABSOLUTE: 0.3 THOU/MM3 (ref 0–0.4)
ERYTHROCYTE [DISTWIDTH] IN BLOOD BY AUTOMATED COUNT: 14.3 % (ref 11.5–14.5)
GFR SERPL CREATININE-BSD FRML MDRD: 7 ML/MIN/1.73M2
GLUCOSE SERPL-MCNC: 84 MG/DL (ref 74–109)
HCT VFR BLD AUTO: 33.1 % (ref 42–52)
HGB BLD-MCNC: 10.1 GM/DL (ref 14–18)
IMM GRANULOCYTES # BLD AUTO: 0.01 THOU/MM3 (ref 0–0.07)
IMM GRANULOCYTES NFR BLD AUTO: 0.2 %
LYMPHOCYTES ABSOLUTE: 1.1 THOU/MM3 (ref 1–4.8)
LYMPHOCYTES NFR BLD AUTO: 17.7 %
MAGNESIUM SERPL-MCNC: 2.6 MG/DL (ref 1.6–2.6)
MCH RBC QN AUTO: 31.5 PG (ref 26–33)
MCHC RBC AUTO-ENTMCNC: 30.5 GM/DL (ref 32.2–35.5)
MCV RBC AUTO: 103.1 FL (ref 80–94)
MONOCYTES ABSOLUTE: 0.8 THOU/MM3 (ref 0.4–1.3)
MONOCYTES NFR BLD AUTO: 13.2 %
NEUTROPHILS ABSOLUTE: 3.9 THOU/MM3 (ref 1.8–7.7)
NEUTROPHILS NFR BLD AUTO: 63.3 %
NRBC BLD AUTO-RTO: 0 /100 WBC
PLATELET # BLD AUTO: 118 THOU/MM3 (ref 130–400)
PMV BLD AUTO: 10.4 FL (ref 9.4–12.4)
POTASSIUM SERPL-SCNC: 5 MEQ/L (ref 3.5–5.2)
RBC # BLD AUTO: 3.21 MILL/MM3 (ref 4.7–6.1)
SODIUM SERPL-SCNC: 137 MEQ/L (ref 135–145)
WBC # BLD AUTO: 6.1 THOU/MM3 (ref 4.8–10.8)

## 2025-08-16 PROCEDURE — 83735 ASSAY OF MAGNESIUM: CPT

## 2025-08-16 PROCEDURE — 6370000000 HC RX 637 (ALT 250 FOR IP): Performed by: INTERNAL MEDICINE

## 2025-08-16 PROCEDURE — 6370000000 HC RX 637 (ALT 250 FOR IP): Performed by: NURSE PRACTITIONER

## 2025-08-16 PROCEDURE — 2500000003 HC RX 250 WO HCPCS: Performed by: NURSE PRACTITIONER

## 2025-08-16 PROCEDURE — 36415 COLL VENOUS BLD VENIPUNCTURE: CPT

## 2025-08-16 PROCEDURE — 90935 HEMODIALYSIS ONE EVALUATION: CPT

## 2025-08-16 PROCEDURE — 99232 SBSQ HOSP IP/OBS MODERATE 35: CPT | Performed by: INTERNAL MEDICINE

## 2025-08-16 PROCEDURE — 2140000000 HC CCU INTERMEDIATE R&B

## 2025-08-16 PROCEDURE — 80048 BASIC METABOLIC PNL TOTAL CA: CPT

## 2025-08-16 PROCEDURE — 85025 COMPLETE CBC W/AUTO DIFF WBC: CPT

## 2025-08-16 RX ADMIN — APIXABAN 5 MG: 5 TABLET, FILM COATED ORAL at 20:38

## 2025-08-16 RX ADMIN — SODIUM CHLORIDE, PRESERVATIVE FREE 10 ML: 5 INJECTION INTRAVENOUS at 12:37

## 2025-08-16 RX ADMIN — ISOSORBIDE MONONITRATE 120 MG: 60 TABLET, EXTENDED RELEASE ORAL at 12:37

## 2025-08-16 RX ADMIN — APIXABAN 5 MG: 5 TABLET, FILM COATED ORAL at 12:37

## 2025-08-16 RX ADMIN — CLONIDINE HYDROCHLORIDE 0.3 MG: 0.2 TABLET ORAL at 12:36

## 2025-08-16 RX ADMIN — Medication 1 TABLET: at 12:36

## 2025-08-16 RX ADMIN — Medication 100 MG: at 12:36

## 2025-08-16 RX ADMIN — FOLIC ACID 1 MG: 1 TABLET ORAL at 12:36

## 2025-08-16 RX ADMIN — HYDRALAZINE HYDROCHLORIDE 100 MG: 50 TABLET ORAL at 17:19

## 2025-08-16 RX ADMIN — ASPIRIN 81 MG: 81 TABLET, COATED ORAL at 12:37

## 2025-08-16 RX ADMIN — LABETALOL HYDROCHLORIDE 100 MG: 100 TABLET, FILM COATED ORAL at 12:36

## 2025-08-16 RX ADMIN — ISOSORBIDE MONONITRATE 120 MG: 60 TABLET, EXTENDED RELEASE ORAL at 22:00

## 2025-08-16 RX ADMIN — HYDRALAZINE HYDROCHLORIDE 100 MG: 50 TABLET ORAL at 06:18

## 2025-08-16 RX ADMIN — SODIUM CHLORIDE, PRESERVATIVE FREE 10 ML: 5 INJECTION INTRAVENOUS at 20:37

## 2025-08-16 RX ADMIN — ATORVASTATIN CALCIUM 40 MG: 40 TABLET, FILM COATED ORAL at 12:37

## 2025-08-16 RX ADMIN — AMIODARONE HYDROCHLORIDE 200 MG: 200 TABLET ORAL at 12:37

## 2025-08-16 ASSESSMENT — PAIN SCALES - GENERAL: PAINLEVEL_OUTOF10: 0

## 2025-08-17 VITALS
SYSTOLIC BLOOD PRESSURE: 145 MMHG | DIASTOLIC BLOOD PRESSURE: 59 MMHG | BODY MASS INDEX: 24.67 KG/M2 | RESPIRATION RATE: 17 BRPM | WEIGHT: 198.41 LBS | OXYGEN SATURATION: 100 % | HEART RATE: 83 BPM | TEMPERATURE: 98.4 F | HEIGHT: 75 IN

## 2025-08-17 LAB
ANION GAP SERPL CALC-SCNC: 13 MEQ/L (ref 8–16)
BASOPHILS ABSOLUTE: 0 THOU/MM3 (ref 0–0.1)
BASOPHILS NFR BLD AUTO: 0.5 %
BUN SERPL-MCNC: 33 MG/DL (ref 8–23)
CALCIUM SERPL-MCNC: 8.6 MG/DL (ref 8.5–10.5)
CHLORIDE SERPL-SCNC: 103 MEQ/L (ref 98–111)
CO2 SERPL-SCNC: 21 MEQ/L (ref 22–29)
CREAT SERPL-MCNC: 6.2 MG/DL (ref 0.7–1.2)
DEPRECATED RDW RBC AUTO: 56.1 FL (ref 35–45)
EOSINOPHIL NFR BLD AUTO: 4.5 %
EOSINOPHILS ABSOLUTE: 0.3 THOU/MM3 (ref 0–0.4)
ERYTHROCYTE [DISTWIDTH] IN BLOOD BY AUTOMATED COUNT: 14.4 % (ref 11.5–14.5)
GFR SERPL CREATININE-BSD FRML MDRD: 10 ML/MIN/1.73M2
GLUCOSE SERPL-MCNC: 80 MG/DL (ref 74–109)
HCT VFR BLD AUTO: 32.5 % (ref 42–52)
HGB BLD-MCNC: 9.6 GM/DL (ref 14–18)
IMM GRANULOCYTES # BLD AUTO: 0.01 THOU/MM3 (ref 0–0.07)
IMM GRANULOCYTES NFR BLD AUTO: 0.2 %
LYMPHOCYTES ABSOLUTE: 0.9 THOU/MM3 (ref 1–4.8)
LYMPHOCYTES NFR BLD AUTO: 15.7 %
MAGNESIUM SERPL-MCNC: 2.6 MG/DL (ref 1.6–2.6)
MCH RBC QN AUTO: 31.4 PG (ref 26–33)
MCHC RBC AUTO-ENTMCNC: 29.5 GM/DL (ref 32.2–35.5)
MCV RBC AUTO: 106.2 FL (ref 80–94)
MONOCYTES ABSOLUTE: 0.8 THOU/MM3 (ref 0.4–1.3)
MONOCYTES NFR BLD AUTO: 13.6 %
NEUTROPHILS ABSOLUTE: 3.8 THOU/MM3 (ref 1.8–7.7)
NEUTROPHILS NFR BLD AUTO: 65.5 %
NRBC BLD AUTO-RTO: 0 /100 WBC
PLATELET # BLD AUTO: 116 THOU/MM3 (ref 130–400)
PMV BLD AUTO: 10.6 FL (ref 9.4–12.4)
POTASSIUM SERPL-SCNC: 5.1 MEQ/L (ref 3.5–5.2)
RBC # BLD AUTO: 3.06 MILL/MM3 (ref 4.7–6.1)
SODIUM SERPL-SCNC: 137 MEQ/L (ref 135–145)
WBC # BLD AUTO: 5.8 THOU/MM3 (ref 4.8–10.8)

## 2025-08-17 PROCEDURE — 6370000000 HC RX 637 (ALT 250 FOR IP): Performed by: INTERNAL MEDICINE

## 2025-08-17 PROCEDURE — 2500000003 HC RX 250 WO HCPCS: Performed by: NURSE PRACTITIONER

## 2025-08-17 PROCEDURE — 83735 ASSAY OF MAGNESIUM: CPT

## 2025-08-17 PROCEDURE — 36415 COLL VENOUS BLD VENIPUNCTURE: CPT

## 2025-08-17 PROCEDURE — 80048 BASIC METABOLIC PNL TOTAL CA: CPT

## 2025-08-17 PROCEDURE — 85025 COMPLETE CBC W/AUTO DIFF WBC: CPT

## 2025-08-17 PROCEDURE — 99232 SBSQ HOSP IP/OBS MODERATE 35: CPT | Performed by: INTERNAL MEDICINE

## 2025-08-17 PROCEDURE — 6370000000 HC RX 637 (ALT 250 FOR IP): Performed by: NURSE PRACTITIONER

## 2025-08-17 PROCEDURE — 99239 HOSP IP/OBS DSCHRG MGMT >30: CPT | Performed by: INTERNAL MEDICINE

## 2025-08-17 RX ADMIN — AMIODARONE HYDROCHLORIDE 200 MG: 200 TABLET ORAL at 09:16

## 2025-08-17 RX ADMIN — FOLIC ACID 1 MG: 1 TABLET ORAL at 09:15

## 2025-08-17 RX ADMIN — ISOSORBIDE MONONITRATE 120 MG: 60 TABLET, EXTENDED RELEASE ORAL at 09:16

## 2025-08-17 RX ADMIN — ASPIRIN 81 MG: 81 TABLET, COATED ORAL at 09:15

## 2025-08-17 RX ADMIN — ATORVASTATIN CALCIUM 40 MG: 40 TABLET, FILM COATED ORAL at 09:16

## 2025-08-17 RX ADMIN — HYDRALAZINE HYDROCHLORIDE 100 MG: 50 TABLET ORAL at 06:22

## 2025-08-17 RX ADMIN — Medication 1 TABLET: at 09:15

## 2025-08-17 RX ADMIN — SODIUM CHLORIDE, PRESERVATIVE FREE 10 ML: 5 INJECTION INTRAVENOUS at 09:16

## 2025-08-17 RX ADMIN — Medication 100 MG: at 09:15

## 2025-08-17 RX ADMIN — APIXABAN 5 MG: 5 TABLET, FILM COATED ORAL at 09:15

## 2025-08-18 ENCOUNTER — TELEPHONE (OUTPATIENT)
Age: 60
End: 2025-08-18

## 2025-08-19 ENCOUNTER — TELEPHONE (OUTPATIENT)
Age: 60
End: 2025-08-19

## 2025-08-20 ENCOUNTER — TELEPHONE (OUTPATIENT)
Age: 60
End: 2025-08-20

## 2025-08-26 ENCOUNTER — HOSPITAL ENCOUNTER (OUTPATIENT)
Age: 60
Setting detail: OBSERVATION
Discharge: HOME | End: 2025-08-27
Admitting: STUDENT IN AN ORGANIZED HEALTH CARE EDUCATION/TRAINING PROGRAM
Payer: MEDICARE

## 2025-08-26 ENCOUNTER — APPOINTMENT (OUTPATIENT)
Dept: GENERAL RADIOLOGY | Age: 60
End: 2025-08-26
Payer: MEDICARE

## 2025-08-26 DIAGNOSIS — Z99.2 END STAGE RENAL DISEASE ON DIALYSIS (HCC): Primary | ICD-10-CM

## 2025-08-26 DIAGNOSIS — E87.5 HYPERKALEMIA: ICD-10-CM

## 2025-08-26 DIAGNOSIS — E87.70 HYPERVOLEMIA, UNSPECIFIED HYPERVOLEMIA TYPE: ICD-10-CM

## 2025-08-26 DIAGNOSIS — N18.6 END STAGE RENAL DISEASE ON DIALYSIS (HCC): Primary | ICD-10-CM

## 2025-08-26 DIAGNOSIS — I10 UNCONTROLLED HYPERTENSION: ICD-10-CM

## 2025-08-26 DIAGNOSIS — J81.0 ACUTE PULMONARY EDEMA (HCC): ICD-10-CM

## 2025-08-26 LAB
ALBUMIN SERPL BCG-MCNC: 4.4 G/DL (ref 3.4–4.9)
ALP SERPL-CCNC: 102 U/L (ref 40–129)
ALT SERPL W/O P-5'-P-CCNC: 25 U/L (ref 10–50)
ANION GAP SERPL CALC-SCNC: 19 MEQ/L (ref 8–16)
AST SERPL-CCNC: 31 U/L (ref 10–50)
BASOPHILS ABSOLUTE: 0 THOU/MM3 (ref 0–0.1)
BASOPHILS NFR BLD AUTO: 0.3 %
BILIRUB SERPL-MCNC: 0.4 MG/DL (ref 0.3–1.2)
BUN SERPL-MCNC: 83 MG/DL (ref 8–23)
CALCIUM SERPL-MCNC: 9.4 MG/DL (ref 8.5–10.5)
CHLORIDE SERPL-SCNC: 97 MEQ/L (ref 98–111)
CO2 SERPL-SCNC: 22 MEQ/L (ref 22–29)
CREAT SERPL-MCNC: 9.7 MG/DL (ref 0.7–1.2)
DEPRECATED RDW RBC AUTO: 54 FL (ref 35–45)
EKG ATRIAL RATE: 91 BPM
EKG P AXIS: 68 DEGREES
EKG P-R INTERVAL: 222 MS
EKG Q-T INTERVAL: 404 MS
EKG QRS DURATION: 116 MS
EKG QTC CALCULATION (BAZETT): 496 MS
EKG R AXIS: -51 DEGREES
EKG T AXIS: 95 DEGREES
EKG VENTRICULAR RATE: 91 BPM
EOSINOPHIL NFR BLD AUTO: 1.2 %
EOSINOPHILS ABSOLUTE: 0.1 THOU/MM3 (ref 0–0.4)
ERYTHROCYTE [DISTWIDTH] IN BLOOD BY AUTOMATED COUNT: 14.4 % (ref 11.5–14.5)
GFR SERPL CREATININE-BSD FRML MDRD: 6 ML/MIN/1.73M2
GLUCOSE SERPL-MCNC: 118 MG/DL (ref 74–109)
HCT VFR BLD AUTO: 34.1 % (ref 42–52)
HGB BLD-MCNC: 10.5 GM/DL (ref 14–18)
IMM GRANULOCYTES # BLD AUTO: 0.02 THOU/MM3 (ref 0–0.07)
IMM GRANULOCYTES NFR BLD AUTO: 0.3 %
LYMPHOCYTES ABSOLUTE: 0.8 THOU/MM3 (ref 1–4.8)
LYMPHOCYTES NFR BLD AUTO: 12.5 %
MCH RBC QN AUTO: 31.5 PG (ref 26–33)
MCHC RBC AUTO-ENTMCNC: 30.8 GM/DL (ref 32.2–35.5)
MCV RBC AUTO: 102.4 FL (ref 80–94)
MONOCYTES ABSOLUTE: 0.5 THOU/MM3 (ref 0.4–1.3)
MONOCYTES NFR BLD AUTO: 8 %
NEUTROPHILS ABSOLUTE: 5.1 THOU/MM3 (ref 1.8–7.7)
NEUTROPHILS NFR BLD AUTO: 77.7 %
NRBC BLD AUTO-RTO: 0 /100 WBC
NT-PROBNP SERPL IA-MCNC: ABNORMAL PG/ML (ref 0–124)
OSMOLALITY SERPL CALC.SUM OF ELEC: 301.9 MOSMOL/KG (ref 275–300)
PLATELET # BLD AUTO: 128 THOU/MM3 (ref 130–400)
PMV BLD AUTO: 9.6 FL (ref 9.4–12.4)
POTASSIUM SERPL-SCNC: 6.2 MEQ/L (ref 3.5–5.2)
PROT SERPL-MCNC: 8.4 G/DL (ref 6.4–8.3)
RBC # BLD AUTO: 3.33 MILL/MM3 (ref 4.7–6.1)
SODIUM SERPL-SCNC: 138 MEQ/L (ref 135–145)
TROPONIN, HIGH SENSITIVITY: 106 NG/L (ref 0–12)
TROPONIN, HIGH SENSITIVITY: 107 NG/L (ref 0–12)
WBC # BLD AUTO: 6.5 THOU/MM3 (ref 4.8–10.8)

## 2025-08-26 PROCEDURE — 99291 CRITICAL CARE FIRST HOUR: CPT

## 2025-08-26 PROCEDURE — 71045 X-RAY EXAM CHEST 1 VIEW: CPT

## 2025-08-26 PROCEDURE — 83880 ASSAY OF NATRIURETIC PEPTIDE: CPT

## 2025-08-26 PROCEDURE — 96374 THER/PROPH/DIAG INJ IV PUSH: CPT

## 2025-08-26 PROCEDURE — 84484 ASSAY OF TROPONIN QUANT: CPT

## 2025-08-26 PROCEDURE — 6360000002 HC RX W HCPCS: Performed by: PHYSICIAN ASSISTANT

## 2025-08-26 PROCEDURE — G0378 HOSPITAL OBSERVATION PER HR: HCPCS

## 2025-08-26 PROCEDURE — 6370000000 HC RX 637 (ALT 250 FOR IP): Performed by: PHYSICIAN ASSISTANT

## 2025-08-26 PROCEDURE — 2500000003 HC RX 250 WO HCPCS

## 2025-08-26 PROCEDURE — 90935 HEMODIALYSIS ONE EVALUATION: CPT

## 2025-08-26 PROCEDURE — 99223 1ST HOSP IP/OBS HIGH 75: CPT

## 2025-08-26 PROCEDURE — 36415 COLL VENOUS BLD VENIPUNCTURE: CPT

## 2025-08-26 PROCEDURE — 6370000000 HC RX 637 (ALT 250 FOR IP)

## 2025-08-26 PROCEDURE — 80053 COMPREHEN METABOLIC PANEL: CPT

## 2025-08-26 PROCEDURE — 93010 ELECTROCARDIOGRAM REPORT: CPT | Performed by: INTERNAL MEDICINE

## 2025-08-26 PROCEDURE — 99222 1ST HOSP IP/OBS MODERATE 55: CPT | Performed by: INTERNAL MEDICINE

## 2025-08-26 PROCEDURE — 85025 COMPLETE CBC W/AUTO DIFF WBC: CPT

## 2025-08-26 PROCEDURE — 93005 ELECTROCARDIOGRAM TRACING: CPT | Performed by: PHYSICIAN ASSISTANT

## 2025-08-26 RX ORDER — SODIUM CHLORIDE 0.9 % (FLUSH) 0.9 %
5-40 SYRINGE (ML) INJECTION PRN
Status: DISCONTINUED | OUTPATIENT
Start: 2025-08-26 | End: 2025-08-27 | Stop reason: HOSPADM

## 2025-08-26 RX ORDER — LABETALOL 100 MG/1
100 TABLET, FILM COATED ORAL EVERY 8 HOURS
Status: DISCONTINUED | OUTPATIENT
Start: 2025-08-26 | End: 2025-08-27 | Stop reason: HOSPADM

## 2025-08-26 RX ORDER — LANOLIN ALCOHOL/MO/W.PET/CERES
100 CREAM (GRAM) TOPICAL DAILY
Status: DISCONTINUED | OUTPATIENT
Start: 2025-08-26 | End: 2025-08-27 | Stop reason: HOSPADM

## 2025-08-26 RX ORDER — SODIUM CHLORIDE 0.9 % (FLUSH) 0.9 %
5-40 SYRINGE (ML) INJECTION EVERY 12 HOURS SCHEDULED
Status: DISCONTINUED | OUTPATIENT
Start: 2025-08-26 | End: 2025-08-27 | Stop reason: HOSPADM

## 2025-08-26 RX ORDER — ATORVASTATIN CALCIUM 40 MG/1
40 TABLET, FILM COATED ORAL DAILY
Status: DISCONTINUED | OUTPATIENT
Start: 2025-08-26 | End: 2025-08-27 | Stop reason: HOSPADM

## 2025-08-26 RX ORDER — SODIUM CHLORIDE 9 MG/ML
INJECTION, SOLUTION INTRAVENOUS PRN
Status: DISCONTINUED | OUTPATIENT
Start: 2025-08-26 | End: 2025-08-27 | Stop reason: HOSPADM

## 2025-08-26 RX ORDER — LORAZEPAM 2 MG/1
2 TABLET ORAL
Status: DISCONTINUED | OUTPATIENT
Start: 2025-08-26 | End: 2025-08-27 | Stop reason: HOSPADM

## 2025-08-26 RX ORDER — AMLODIPINE BESYLATE 5 MG/1
5 TABLET ORAL DAILY
Status: DISCONTINUED | OUTPATIENT
Start: 2025-08-26 | End: 2025-08-27 | Stop reason: HOSPADM

## 2025-08-26 RX ORDER — ONDANSETRON 2 MG/ML
4 INJECTION INTRAMUSCULAR; INTRAVENOUS EVERY 6 HOURS PRN
Status: DISCONTINUED | OUTPATIENT
Start: 2025-08-26 | End: 2025-08-27 | Stop reason: HOSPADM

## 2025-08-26 RX ORDER — ONDANSETRON 4 MG/1
4 TABLET, ORALLY DISINTEGRATING ORAL EVERY 8 HOURS PRN
Status: DISCONTINUED | OUTPATIENT
Start: 2025-08-26 | End: 2025-08-27 | Stop reason: HOSPADM

## 2025-08-26 RX ORDER — ACETAMINOPHEN 650 MG/1
650 SUPPOSITORY RECTAL EVERY 6 HOURS PRN
Status: DISCONTINUED | OUTPATIENT
Start: 2025-08-26 | End: 2025-08-27 | Stop reason: HOSPADM

## 2025-08-26 RX ORDER — HYDRALAZINE HYDROCHLORIDE 20 MG/ML
10 INJECTION INTRAMUSCULAR; INTRAVENOUS ONCE
Status: COMPLETED | OUTPATIENT
Start: 2025-08-26 | End: 2025-08-26

## 2025-08-26 RX ORDER — LORAZEPAM 1 MG/1
1 TABLET ORAL
Status: DISCONTINUED | OUTPATIENT
Start: 2025-08-26 | End: 2025-08-27 | Stop reason: HOSPADM

## 2025-08-26 RX ORDER — ISOSORBIDE MONONITRATE 60 MG/1
120 TABLET, EXTENDED RELEASE ORAL 2 TIMES DAILY
Status: DISCONTINUED | OUTPATIENT
Start: 2025-08-26 | End: 2025-08-27 | Stop reason: HOSPADM

## 2025-08-26 RX ORDER — FOLIC ACID 1 MG/1
1 TABLET ORAL DAILY
Status: DISCONTINUED | OUTPATIENT
Start: 2025-08-26 | End: 2025-08-27 | Stop reason: HOSPADM

## 2025-08-26 RX ORDER — ACETAMINOPHEN 325 MG/1
650 TABLET ORAL EVERY 6 HOURS PRN
Status: DISCONTINUED | OUTPATIENT
Start: 2025-08-26 | End: 2025-08-27 | Stop reason: HOSPADM

## 2025-08-26 RX ORDER — POLYETHYLENE GLYCOL 3350 17 G/17G
17 POWDER, FOR SOLUTION ORAL DAILY PRN
Status: DISCONTINUED | OUTPATIENT
Start: 2025-08-26 | End: 2025-08-27 | Stop reason: HOSPADM

## 2025-08-26 RX ORDER — ASPIRIN 81 MG/1
81 TABLET ORAL DAILY
Status: DISCONTINUED | OUTPATIENT
Start: 2025-08-26 | End: 2025-08-27 | Stop reason: HOSPADM

## 2025-08-26 RX ORDER — HYDRALAZINE HYDROCHLORIDE 50 MG/1
100 TABLET, FILM COATED ORAL EVERY 8 HOURS
Status: DISCONTINUED | OUTPATIENT
Start: 2025-08-26 | End: 2025-08-27 | Stop reason: HOSPADM

## 2025-08-26 RX ORDER — AMIODARONE HYDROCHLORIDE 200 MG/1
200 TABLET ORAL DAILY
Status: DISCONTINUED | OUTPATIENT
Start: 2025-08-26 | End: 2025-08-27 | Stop reason: HOSPADM

## 2025-08-26 RX ORDER — LORAZEPAM 2 MG/1
4 TABLET ORAL
Status: DISCONTINUED | OUTPATIENT
Start: 2025-08-26 | End: 2025-08-27 | Stop reason: HOSPADM

## 2025-08-26 RX ORDER — MULTIVITAMIN WITH IRON
1 TABLET ORAL DAILY
Status: DISCONTINUED | OUTPATIENT
Start: 2025-08-26 | End: 2025-08-27 | Stop reason: HOSPADM

## 2025-08-26 RX ADMIN — SODIUM ZIRCONIUM CYCLOSILICATE 10 G: 10 POWDER, FOR SUSPENSION ORAL at 04:22

## 2025-08-26 RX ADMIN — HYDRALAZINE HYDROCHLORIDE 10 MG: 20 INJECTION INTRAMUSCULAR; INTRAVENOUS at 04:22

## 2025-08-26 RX ADMIN — FOLIC ACID 1 MG: 1 TABLET ORAL at 15:58

## 2025-08-26 RX ADMIN — ATORVASTATIN CALCIUM 40 MG: 40 TABLET, FILM COATED ORAL at 14:22

## 2025-08-26 RX ADMIN — Medication 100 MG: at 15:58

## 2025-08-26 RX ADMIN — SODIUM CHLORIDE, PRESERVATIVE FREE 10 ML: 5 INJECTION INTRAVENOUS at 14:27

## 2025-08-26 RX ADMIN — LABETALOL HYDROCHLORIDE 100 MG: 100 TABLET, FILM COATED ORAL at 23:41

## 2025-08-26 RX ADMIN — SODIUM CHLORIDE, PRESERVATIVE FREE 10 ML: 5 INJECTION INTRAVENOUS at 20:56

## 2025-08-26 RX ADMIN — AMIODARONE HYDROCHLORIDE 200 MG: 200 TABLET ORAL at 15:58

## 2025-08-26 RX ADMIN — HYDRALAZINE HYDROCHLORIDE 100 MG: 50 TABLET ORAL at 14:22

## 2025-08-26 RX ADMIN — CLONIDINE HYDROCHLORIDE 0.3 MG: 0.2 TABLET ORAL at 15:58

## 2025-08-26 RX ADMIN — ASPIRIN 81 MG: 81 TABLET, COATED ORAL at 14:22

## 2025-08-26 RX ADMIN — APIXABAN 2.5 MG: 2.5 TABLET, FILM COATED ORAL at 20:56

## 2025-08-26 RX ADMIN — LABETALOL HYDROCHLORIDE 100 MG: 100 TABLET, FILM COATED ORAL at 06:58

## 2025-08-26 RX ADMIN — Medication 1 TABLET: at 14:22

## 2025-08-26 RX ADMIN — LABETALOL HYDROCHLORIDE 100 MG: 100 TABLET, FILM COATED ORAL at 15:58

## 2025-08-26 RX ADMIN — ACETAMINOPHEN 650 MG: 325 TABLET ORAL at 21:57

## 2025-08-26 RX ADMIN — AMLODIPINE BESYLATE 5 MG: 5 TABLET ORAL at 15:58

## 2025-08-26 ASSESSMENT — ENCOUNTER SYMPTOMS
SORE THROAT: 0
NAUSEA: 0
DIARRHEA: 0
VOMITING: 0
RHINORRHEA: 0
COUGH: 0
CHEST TIGHTNESS: 1
ABDOMINAL PAIN: 0
SHORTNESS OF BREATH: 1

## 2025-08-26 ASSESSMENT — PAIN SCALES - GENERAL
PAINLEVEL_OUTOF10: 0
PAINLEVEL_OUTOF10: 8
PAINLEVEL_OUTOF10: 0

## 2025-08-26 ASSESSMENT — PAIN - FUNCTIONAL ASSESSMENT
PAIN_FUNCTIONAL_ASSESSMENT: 0-10
PAIN_FUNCTIONAL_ASSESSMENT: ACTIVITIES ARE NOT PREVENTED
PAIN_FUNCTIONAL_ASSESSMENT: 0-10
PAIN_FUNCTIONAL_ASSESSMENT: 0-10

## 2025-08-26 ASSESSMENT — PAIN DESCRIPTION - DESCRIPTORS: DESCRIPTORS: ACHING;SPASM;DISCOMFORT

## 2025-08-26 ASSESSMENT — PAIN DESCRIPTION - LOCATION: LOCATION: HAND

## 2025-08-26 ASSESSMENT — PAIN DESCRIPTION - ORIENTATION: ORIENTATION: LEFT

## 2025-08-27 VITALS
OXYGEN SATURATION: 94 % | BODY MASS INDEX: 25.49 KG/M2 | HEIGHT: 75 IN | TEMPERATURE: 98.5 F | SYSTOLIC BLOOD PRESSURE: 158 MMHG | RESPIRATION RATE: 18 BRPM | DIASTOLIC BLOOD PRESSURE: 63 MMHG | WEIGHT: 205.03 LBS | HEART RATE: 80 BPM

## 2025-08-27 PROBLEM — R01.1 MURMUR: Status: ACTIVE | Noted: 2025-08-27

## 2025-08-27 LAB
ANION GAP SERPL CALC-SCNC: 14 MEQ/L (ref 8–16)
BASOPHILS ABSOLUTE: 0 THOU/MM3 (ref 0–0.1)
BASOPHILS NFR BLD AUTO: 0.4 %
BUN SERPL-MCNC: 51 MG/DL (ref 8–23)
CALCIUM SERPL-MCNC: 8.2 MG/DL (ref 8.5–10.5)
CHLORIDE SERPL-SCNC: 100 MEQ/L (ref 98–111)
CO2 SERPL-SCNC: 24 MEQ/L (ref 22–29)
CREAT SERPL-MCNC: 7.3 MG/DL (ref 0.7–1.2)
DEPRECATED RDW RBC AUTO: 56.5 FL (ref 35–45)
EOSINOPHIL NFR BLD AUTO: 3.7 %
EOSINOPHILS ABSOLUTE: 0.2 THOU/MM3 (ref 0–0.4)
ERYTHROCYTE [DISTWIDTH] IN BLOOD BY AUTOMATED COUNT: 14.7 % (ref 11.5–14.5)
GFR SERPL CREATININE-BSD FRML MDRD: 8 ML/MIN/1.73M2
GLUCOSE SERPL-MCNC: 83 MG/DL (ref 74–109)
HCT VFR BLD AUTO: 30.9 % (ref 42–52)
HGB BLD-MCNC: 9.5 GM/DL (ref 14–18)
IMM GRANULOCYTES # BLD AUTO: 0.02 THOU/MM3 (ref 0–0.07)
IMM GRANULOCYTES NFR BLD AUTO: 0.4 %
LYMPHOCYTES ABSOLUTE: 0.6 THOU/MM3 (ref 1–4.8)
LYMPHOCYTES NFR BLD AUTO: 13.8 %
MAGNESIUM SERPL-MCNC: 2.7 MG/DL (ref 1.6–2.6)
MCH RBC QN AUTO: 31.8 PG (ref 26–33)
MCHC RBC AUTO-ENTMCNC: 30.7 GM/DL (ref 32.2–35.5)
MCV RBC AUTO: 103.3 FL (ref 80–94)
MONOCYTES ABSOLUTE: 0.6 THOU/MM3 (ref 0.4–1.3)
MONOCYTES NFR BLD AUTO: 12.7 %
NEUTROPHILS ABSOLUTE: 3.2 THOU/MM3 (ref 1.8–7.7)
NEUTROPHILS NFR BLD AUTO: 69 %
NRBC BLD AUTO-RTO: 0 /100 WBC
PHOSPHATE SERPL-MCNC: 6.1 MG/DL (ref 2.5–4.5)
PLATELET # BLD AUTO: 96 THOU/MM3 (ref 130–400)
PLATELET BLD QL SMEAR: ABNORMAL
PMV BLD AUTO: 10.5 FL (ref 9.4–12.4)
POTASSIUM SERPL-SCNC: 5.1 MEQ/L (ref 3.5–5.2)
POTASSIUM SERPL-SCNC: 5.4 MEQ/L (ref 3.5–5.2)
RBC # BLD AUTO: 2.99 MILL/MM3 (ref 4.7–6.1)
SCAN OF BLOOD SMEAR: NORMAL
SODIUM SERPL-SCNC: 138 MEQ/L (ref 135–145)
WBC # BLD AUTO: 4.6 THOU/MM3 (ref 4.8–10.8)

## 2025-08-27 PROCEDURE — 84132 ASSAY OF SERUM POTASSIUM: CPT

## 2025-08-27 PROCEDURE — 85025 COMPLETE CBC W/AUTO DIFF WBC: CPT

## 2025-08-27 PROCEDURE — G0378 HOSPITAL OBSERVATION PER HR: HCPCS

## 2025-08-27 PROCEDURE — 99238 HOSP IP/OBS DSCHRG MGMT 30/<: CPT | Performed by: INTERNAL MEDICINE

## 2025-08-27 PROCEDURE — 36415 COLL VENOUS BLD VENIPUNCTURE: CPT

## 2025-08-27 PROCEDURE — 80048 BASIC METABOLIC PNL TOTAL CA: CPT

## 2025-08-27 PROCEDURE — 90935 HEMODIALYSIS ONE EVALUATION: CPT

## 2025-08-27 PROCEDURE — 6370000000 HC RX 637 (ALT 250 FOR IP)

## 2025-08-27 PROCEDURE — 2500000003 HC RX 250 WO HCPCS

## 2025-08-27 PROCEDURE — 99232 SBSQ HOSP IP/OBS MODERATE 35: CPT | Performed by: INTERNAL MEDICINE

## 2025-08-27 PROCEDURE — 83735 ASSAY OF MAGNESIUM: CPT

## 2025-08-27 PROCEDURE — 84100 ASSAY OF PHOSPHORUS: CPT

## 2025-08-27 RX ORDER — ISOSORBIDE MONONITRATE 120 MG/1
120 TABLET, EXTENDED RELEASE ORAL 2 TIMES DAILY
Qty: 180 TABLET | Refills: 0 | Status: SHIPPED | OUTPATIENT
Start: 2025-08-27 | End: 2025-11-25

## 2025-08-27 RX ORDER — HYDRALAZINE HYDROCHLORIDE 100 MG/1
100 TABLET, FILM COATED ORAL EVERY 8 HOURS
Qty: 270 TABLET | Refills: 0 | Status: SHIPPED | OUTPATIENT
Start: 2025-08-27 | End: 2025-11-25

## 2025-08-27 RX ORDER — CLONIDINE HYDROCHLORIDE 0.3 MG/1
0.3 TABLET ORAL 3 TIMES DAILY
Qty: 270 TABLET | Refills: 0 | Status: SHIPPED | OUTPATIENT
Start: 2025-08-27

## 2025-08-27 RX ADMIN — AMIODARONE HYDROCHLORIDE 200 MG: 200 TABLET ORAL at 15:51

## 2025-08-27 RX ADMIN — ASPIRIN 81 MG: 81 TABLET, COATED ORAL at 15:55

## 2025-08-27 RX ADMIN — FOLIC ACID 1 MG: 1 TABLET ORAL at 15:53

## 2025-08-27 RX ADMIN — ATORVASTATIN CALCIUM 40 MG: 40 TABLET, FILM COATED ORAL at 15:54

## 2025-08-27 RX ADMIN — ISOSORBIDE MONONITRATE 120 MG: 60 TABLET, EXTENDED RELEASE ORAL at 15:53

## 2025-08-27 RX ADMIN — Medication 100 MG: at 15:52

## 2025-08-27 RX ADMIN — SODIUM CHLORIDE, PRESERVATIVE FREE 10 ML: 5 INJECTION INTRAVENOUS at 15:56

## 2025-08-27 RX ADMIN — Medication 1 TABLET: at 15:53

## 2025-08-27 RX ADMIN — LABETALOL HYDROCHLORIDE 100 MG: 100 TABLET, FILM COATED ORAL at 15:58

## 2025-08-27 RX ADMIN — AMLODIPINE BESYLATE 5 MG: 5 TABLET ORAL at 15:55

## 2025-08-27 RX ADMIN — CLONIDINE HYDROCHLORIDE 0.3 MG: 0.2 TABLET ORAL at 15:52

## 2025-08-27 RX ADMIN — APIXABAN 2.5 MG: 2.5 TABLET, FILM COATED ORAL at 15:53

## 2025-08-27 ASSESSMENT — PATIENT HEALTH QUESTIONNAIRE - PHQ9
SUM OF ALL RESPONSES TO PHQ QUESTIONS 1-9: 0
2. FEELING DOWN, DEPRESSED OR HOPELESS: NOT AT ALL
SUM OF ALL RESPONSES TO PHQ QUESTIONS 1-9: 0
1. LITTLE INTEREST OR PLEASURE IN DOING THINGS: NOT AT ALL

## 2025-08-27 ASSESSMENT — LIFESTYLE VARIABLES
HOW OFTEN DO YOU HAVE A DRINK CONTAINING ALCOHOL: NEVER
HOW MANY STANDARD DRINKS CONTAINING ALCOHOL DO YOU HAVE ON A TYPICAL DAY: PATIENT DOES NOT DRINK

## 2025-08-27 ASSESSMENT — PAIN SCALES - GENERAL: PAINLEVEL_OUTOF10: 0

## 2025-08-30 ENCOUNTER — TELEPHONE (OUTPATIENT)
Age: 60
End: 2025-08-30

## 2025-09-01 ENCOUNTER — TELEPHONE (OUTPATIENT)
Age: 60
End: 2025-09-01

## 2025-09-02 ENCOUNTER — TELEPHONE (OUTPATIENT)
Age: 60
End: 2025-09-02

## (undated) DEVICE — YANKAUER,BULB TIP,W/O VENT,RIGID,STERILE: Brand: MEDLINE

## (undated) DEVICE — PAD GEN USE BORDERED ADH 14IN 2IN AND 12IN 4IN GZ UNIV ST

## (undated) DEVICE — GLOVE SURG SZ 7.5 L11.73IN FNGR THK9.8MIL STRW LTX POLYMER

## (undated) DEVICE — STAPLER INT L44CM 12 FIRING B FRM PWR + W/ GRIPPING SURF

## (undated) DEVICE — SUTURE PERMA-HAND SZ 2-0 L30IN NONABSORBABLE BLK L26MM SH K833H

## (undated) DEVICE — SUTURE PERMA-HAND SZ 2-0 L24IN NONABSORBABLE BLK W/O NDL SA75H

## (undated) DEVICE — TUBING, SUCTION, 1/4" X 20', STRAIGHT: Brand: MEDLINE INDUSTRIES, INC.

## (undated) DEVICE — GLOVE ORANGE PI 7 1/2   MSG9075

## (undated) DEVICE — PAD OP RM W20XL72XH2IN PRECIS CUT FLAT EC BIOCLINIC

## (undated) DEVICE — DRESSING TRNSPAR W5XL4.5IN FLM SHT SEMIPERMEABLE WIND

## (undated) DEVICE — PACK-MAJOR

## (undated) DEVICE — SUTURE VICRYL COATED PLU 4 0 UN SH TAPER POINT CIRCLE BRAIDED

## (undated) DEVICE — SUTURE NONABSORBABLE MONOFILAMENT 4-0 P-3 18 IN ETHILON 699H

## (undated) DEVICE — GLOVE SURG SZ 8 L11.77IN FNGR THK9.8MIL STRW LTX POLYMER

## (undated) DEVICE — DRESSING,GAUZE,XEROFORM,CURAD,5"X9",ST: Brand: CURAD

## (undated) DEVICE — COUNTER NDL 40 COUNT HLD 70 FOAM BLK ADH W/ MAG

## (undated) DEVICE — HYPODERMIC SAFETY NEEDLE: Brand: MAGELLAN

## (undated) DEVICE — SUTURE PERMA-HAND SZ 0 L24IN NONABSORBABLE BLK W/O NDL SILK SA76G

## (undated) DEVICE — 450 ML BOTTLE OF 0.05% CHLORHEXIDINE GLUCONATE IN 99.95% STERILE WATER FOR IRRIGATION, USP AND APPLICATOR.: Brand: IRRISEPT ANTIMICROBIAL WOUND LAVAGE

## (undated) DEVICE — BASIC SINGLE BASIN BTC-LF: Brand: MEDLINE INDUSTRIES, INC.

## (undated) DEVICE — SUTURE VCRL SZ 0 L27IN ABSRB UD L36MM CP-1 1/2 CIR REV CUT J267H

## (undated) DEVICE — SYRINGE MED 10ML LUERLOCK TIP W/O SFTY DISP

## (undated) DEVICE — GLOVE ORANGE PI 8   MSG9080

## (undated) DEVICE — 3M™ STERI-DRAPE™ INSTRUMENT POUCH 1018: Brand: STERI-DRAPE™

## (undated) DEVICE — SUTURE PROL SZ 6-0 L24IN NONABSORBABLE BLU L9.3MM BV-1 3/8 8805H

## (undated) DEVICE — INTENDED FOR TISSUE SEPARATION, AND OTHER PROCEDURES THAT REQUIRE A SHARP SURGICAL BLADE TO PUNCTURE OR CUT.: Brand: BARD-PARKER ® CARBON RIB-BACK BLADES

## (undated) DEVICE — SUTURE SOFSILK SZ 2 L30IN NONABSORBABLE WHT V-26 L37MM 1/2 CS746

## (undated) DEVICE — DRAPE,INSTRUMENT,MAGNETIC,10X16: Brand: MEDLINE

## (undated) DEVICE — SURGIFOAM SPNG SZ 100

## (undated) DEVICE — GAUZE,SPONGE,8"X4",12PLY,XRAY,STRL,LF: Brand: MEDLINE

## (undated) DEVICE — RELOAD STPL L45MM H1-2.6MM MESENTERY THN TISS WHT GRIPPING

## (undated) DEVICE — CONMED DISPOSABLE BIPOLAR CABLE, 10' (3.05M): Brand: CONMED

## (undated) DEVICE — CLIP LIG M TI 6 SIL HNDL FOR OPN AND ENDOSCP SGL APPL

## (undated) DEVICE — SOLUTION IV IRRIG POUR BRL 0.9% SODIUM CHL 2F7124

## (undated) DEVICE — PENCIL SMK EVAC ALL IN 1 DSGN ENH VISIBILITY IMPROVED AIR

## (undated) DEVICE — BLADE OPHTH D5MM 15DEG GRN W/ RND KNURLED HNDL MICRO-SHARP

## (undated) DEVICE — SUTURE PERMAHAND SZ 4-0 L18IN NONABSORBABLE BLK SILK BRAID A183H

## (undated) DEVICE — CONNECTOR STR 3/8IN ST 050506000 375STRCONN

## (undated) DEVICE — DRAPE,U/SHT,SPLIT,FILM,60X84,STERILE: Brand: MEDLINE

## (undated) DEVICE — 1010 S-DRAPE TOWEL DRAPE 10/BX: Brand: STERI-DRAPE™

## (undated) DEVICE — SPONGE LAP W18XL18IN WHT COT 4 PLY FLD STRUNG RADPQ DISP ST

## (undated) DEVICE — DRAIN SURG SGL COLL PT TB FOR ATS BG OASIS

## (undated) DEVICE — GLOVE ORANGE PI 7   MSG9070

## (undated) DEVICE — APPLICATOR MEDICATED 26 CC SOLUTION HI LT ORNG CHLORAPREP

## (undated) DEVICE — PACK,UNIVERSAL,NO GOWNS: Brand: MEDLINE

## (undated) DEVICE — PAD,NON-ADHERENT,3X8,STERILE,LF,1/PK: Brand: MEDLINE

## (undated) DEVICE — SUTURE PROL SZ 7-0 L24IN NONABSORBABLE BLU BV-1 L9.3MM 3/8 8304H

## (undated) DEVICE — DECANTER FLD 9IN ST BG FOR ASEP TRNSF OF FLD

## (undated) DEVICE — THORACIC CATHETER,STRAIGHT: Brand: ARGYLE

## (undated) DEVICE — PROVE COVER: Brand: UNBRANDED

## (undated) DEVICE — SYRINGE IRRIG 60ML SFT PLIABLE BLB EZ TO GRP 1 HND USE W/

## (undated) DEVICE — PROBE ABLATN NERVE BLOCK 180 DEG 8 MM BALL TIP CRYOSPHERE

## (undated) DEVICE — VASCULAR: Brand: MEDLINE INDUSTRIES, INC.

## (undated) DEVICE — PACK PROCEDURE SURG SET UP SRMC

## (undated) DEVICE — GOWN,SIRUS,NONRNF,SETINSLV,XL,20/CS: Brand: MEDLINE

## (undated) DEVICE — CONNECTOR PERF 3/8X3/8X3/8IN EQL WYE

## (undated) DEVICE — COVER,LIGHT HANDLE,FLX,2/PK: Brand: MEDLINE INDUSTRIES, INC.

## (undated) DEVICE — SYRINGE MED 10ML TRNSLUC BRL PLUNG BLK MRK POLYPR CTRL

## (undated) DEVICE — SUTURE PERMAHAND SZ 0 L30IN NONABSORBABLE BLK L30MM PSL 3/8 590H

## (undated) DEVICE — 3M™ IOBAN™ 2 ANTIMICROBIAL INCISE DRAPE 6650EZ: Brand: IOBAN™ 2

## (undated) DEVICE — SPONGE LAP W18XL18IN WHT COT 4 PLY FLD STRUNG RADPQ DISP ST 2 PER PACK

## (undated) DEVICE — THORACIC CATHETER,RIGHT ANGLE: Brand: ARGYLE

## (undated) DEVICE — PATIENT RETURN ELECTRODE, SINGLE-USE, CONTACT QUALITY MONITORING, ADULT, WITH 9FT CORD, FOR PATIENTS WEIGING OVER 33LBS. (15KG): Brand: MEGADYNE

## (undated) DEVICE — SPONGE GZ W4XL4IN COT 12 PLY TYP VII WVN C FLD DSGN

## (undated) DEVICE — MARKER,SKIN,WI/RULER AND LABELS: Brand: MEDLINE

## (undated) DEVICE — Device

## (undated) DEVICE — PADDING,UNDERCAST,COTTON, 4"X4YD STERILE: Brand: MEDLINE

## (undated) DEVICE — CHLORAPREP 26ML ORANGE

## (undated) DEVICE — SUTURE VCRL SZ 2 L27IN ABSRB VLT L65MM TP-1 1/2 CIR J649G

## (undated) DEVICE — ELECTROSURGICAL PENCIL BUTTON SWITCH E-Z CLEAN COATED BLADE ELECTRODE 10 FT (3 M) CORD HOLSTER: Brand: MEGADYNE

## (undated) DEVICE — BANDAGE COMPR W4INXL12FT E DISP ESMARCH EVEN

## (undated) DEVICE — SHEET, T, LAPAROTOMY, STERILE: Brand: MEDLINE

## (undated) DEVICE — CLIP LIG SM TI 6 BLU HNDL FOR OPN AND ENDOSCP SGL APPL

## (undated) DEVICE — SUTURE NONABSORBABLE MONOFILAMENT 4-0 FS-2 18 IN ETHILON 662H

## (undated) DEVICE — DRAPE,EXTREMITY,89X128,STERILE: Brand: MEDLINE

## (undated) DEVICE — SPONGE,PEANUT,XRAY,ST,SM,3/8",5/CARD: Brand: MEDLINE INDUSTRIES, INC.

## (undated) DEVICE — SUTURE MCRYL + SZ 3-0 L27IN ABSRB UD PS1 L24MM 3/8 CIR REV MCP936H

## (undated) DEVICE — SPONGE DRN W4XL4IN RAYON/POLYESTER 6 PLY NONWOVEN PRECUT

## (undated) DEVICE — BANDAGE,GAUZE,4.5"X4.1YD,STERILE,LF: Brand: MEDLINE

## (undated) DEVICE — SPONGE GZ W4XL4IN COT 12 PLY TYP VII WVN C FLD DSGN STERILE

## (undated) DEVICE — TAPE UMBILICAL W1/16XL30IN COTTON ROUND NONRADIOPAQUE

## (undated) DEVICE — SYRINGE,EAR/ULCER, 2 OZ, STERILE: Brand: MEDLINE

## (undated) DEVICE — RELOAD STPL L45MM H2-4.1MM THCK TISS GRN GRIPPING SURF B